# Patient Record
Sex: FEMALE | Race: WHITE | NOT HISPANIC OR LATINO | Employment: OTHER | URBAN - METROPOLITAN AREA
[De-identification: names, ages, dates, MRNs, and addresses within clinical notes are randomized per-mention and may not be internally consistent; named-entity substitution may affect disease eponyms.]

---

## 2017-01-09 ENCOUNTER — ALLSCRIPTS OFFICE VISIT (OUTPATIENT)
Dept: OTHER | Facility: OTHER | Age: 57
End: 2017-01-09

## 2017-01-09 DIAGNOSIS — N18.9 CHRONIC KIDNEY DISEASE: ICD-10-CM

## 2017-01-09 DIAGNOSIS — N18.30 CHRONIC KIDNEY DISEASE, STAGE III (MODERATE) (HCC): ICD-10-CM

## 2017-01-09 DIAGNOSIS — I12.9 HYPERTENSIVE CHRONIC KIDNEY DISEASE WITH STAGE 1 THROUGH STAGE 4 CHRONIC KIDNEY DISEASE, OR UNSPECIFIED CHRONIC KIDNEY DISEASE: ICD-10-CM

## 2017-01-20 ENCOUNTER — GENERIC CONVERSION - ENCOUNTER (OUTPATIENT)
Dept: OTHER | Facility: OTHER | Age: 57
End: 2017-01-20

## 2017-02-28 ENCOUNTER — LAB CONVERSION - ENCOUNTER (OUTPATIENT)
Dept: OTHER | Facility: OTHER | Age: 57
End: 2017-02-28

## 2017-02-28 LAB
A/G RATIO (HISTORICAL): 0.9 (CALC) (ref 1–2.5)
ALBUMIN SERPL BCP-MCNC: 3.6 G/DL (ref 3.6–5.1)
ALP SERPL-CCNC: 145 U/L (ref 33–130)
ALT SERPL W P-5'-P-CCNC: 9 U/L (ref 6–29)
AST SERPL W P-5'-P-CCNC: 12 U/L (ref 10–35)
BILIRUB SERPL-MCNC: 0.3 MG/DL (ref 0.2–1.2)
BILIRUBIN DIRECT (HISTORICAL): 0.1 MG/DL
CK SERPL-CCNC: 50 U/L (ref 29–143)
GAMMA GLOBULIN (HISTORICAL): 3.9 G/DL (CALC) (ref 1.9–3.7)
INDIRECT BILIRUBIN (HISTORICAL): 0.2 MG/DL (CALC) (ref 0.2–1.2)
TOTAL PROTEIN (HISTORICAL): 7.5 G/DL (ref 6.1–8.1)

## 2017-03-01 ENCOUNTER — GENERIC CONVERSION - ENCOUNTER (OUTPATIENT)
Dept: OTHER | Facility: OTHER | Age: 57
End: 2017-03-01

## 2017-03-02 DIAGNOSIS — N18.30 CHRONIC KIDNEY DISEASE, STAGE III (MODERATE) (HCC): ICD-10-CM

## 2017-03-02 DIAGNOSIS — E78.5 HYPERLIPIDEMIA: ICD-10-CM

## 2017-04-13 ENCOUNTER — LAB CONVERSION - ENCOUNTER (OUTPATIENT)
Dept: OTHER | Facility: OTHER | Age: 57
End: 2017-04-13

## 2017-04-13 DIAGNOSIS — N18.30 CHRONIC KIDNEY DISEASE, STAGE III (MODERATE) (HCC): ICD-10-CM

## 2017-04-13 DIAGNOSIS — E78.5 HYPERLIPIDEMIA: ICD-10-CM

## 2017-04-13 DIAGNOSIS — I12.9 HYPERTENSIVE CHRONIC KIDNEY DISEASE WITH STAGE 1 THROUGH STAGE 4 CHRONIC KIDNEY DISEASE, OR UNSPECIFIED CHRONIC KIDNEY DISEASE: ICD-10-CM

## 2017-04-13 LAB
A/G RATIO (HISTORICAL): 0.8 (CALC) (ref 1–2.5)
A/G RATIO (HISTORICAL): 0.8 (CALC) (ref 1–2.5)
ALBUMIN SERPL BCP-MCNC: 3.2 G/DL (ref 3.6–5.1)
ALBUMIN SERPL BCP-MCNC: 3.2 G/DL (ref 3.6–5.1)
ALP SERPL-CCNC: 149 U/L (ref 33–130)
ALP SERPL-CCNC: 149 U/L (ref 33–130)
ALT SERPL W P-5'-P-CCNC: 9 U/L (ref 6–29)
ALT SERPL W P-5'-P-CCNC: 9 U/L (ref 6–29)
AST SERPL W P-5'-P-CCNC: 9 U/L (ref 10–35)
AST SERPL W P-5'-P-CCNC: 9 U/L (ref 10–35)
BILIRUB SERPL-MCNC: 0.3 MG/DL (ref 0.2–1.2)
BILIRUB SERPL-MCNC: 0.3 MG/DL (ref 0.2–1.2)
BILIRUBIN DIRECT (HISTORICAL): 0.1 MG/DL
BUN SERPL-MCNC: 32 MG/DL (ref 7–25)
BUN/CREA RATIO (HISTORICAL): 24 (CALC) (ref 6–22)
CALCIUM SERPL-MCNC: 8.8 MG/DL (ref 8.6–10.4)
CHLORIDE SERPL-SCNC: 99 MMOL/L (ref 98–110)
CK SERPL-CCNC: 45 U/L (ref 29–143)
CO2 SERPL-SCNC: 31 MMOL/L (ref 20–31)
CREAT SERPL-MCNC: 1.32 MG/DL (ref 0.5–1.05)
EGFR AFRICAN AMERICAN (HISTORICAL): 51 ML/MIN/1.73M2
EGFR-AMERICAN CALC (HISTORICAL): 44 ML/MIN/1.73M2
GAMMA GLOBULIN (HISTORICAL): 3.8 G/DL (CALC) (ref 1.9–3.7)
GAMMA GLOBULIN (HISTORICAL): 3.8 G/DL (CALC) (ref 1.9–3.7)
GLUCOSE (HISTORICAL): 290 MG/DL (ref 65–99)
INDIRECT BILIRUBIN (HISTORICAL): 0.2 MG/DL (CALC) (ref 0.2–1.2)
POTASSIUM SERPL-SCNC: 5 MMOL/L (ref 3.5–5.3)
SODIUM SERPL-SCNC: 137 MMOL/L (ref 135–146)
TOTAL PROTEIN (HISTORICAL): 7 G/DL (ref 6.1–8.1)
TOTAL PROTEIN (HISTORICAL): 7 G/DL (ref 6.1–8.1)

## 2017-05-01 DIAGNOSIS — E78.5 HYPERLIPIDEMIA: ICD-10-CM

## 2017-05-01 DIAGNOSIS — N18.30 CHRONIC KIDNEY DISEASE, STAGE III (MODERATE) (HCC): ICD-10-CM

## 2017-05-01 DIAGNOSIS — E55.9 VITAMIN D DEFICIENCY: ICD-10-CM

## 2017-05-01 DIAGNOSIS — N18.9 CHRONIC KIDNEY DISEASE: ICD-10-CM

## 2017-05-01 DIAGNOSIS — I12.9 HYPERTENSIVE CHRONIC KIDNEY DISEASE WITH STAGE 1 THROUGH STAGE 4 CHRONIC KIDNEY DISEASE, OR UNSPECIFIED CHRONIC KIDNEY DISEASE: ICD-10-CM

## 2017-05-02 ENCOUNTER — ALLSCRIPTS OFFICE VISIT (OUTPATIENT)
Dept: OTHER | Facility: OTHER | Age: 57
End: 2017-05-02

## 2017-05-09 ENCOUNTER — LAB CONVERSION - ENCOUNTER (OUTPATIENT)
Dept: OTHER | Facility: OTHER | Age: 57
End: 2017-05-09

## 2017-05-09 ENCOUNTER — GENERIC CONVERSION - ENCOUNTER (OUTPATIENT)
Dept: OTHER | Facility: OTHER | Age: 57
End: 2017-05-09

## 2017-05-09 LAB
BASOPHILS # BLD AUTO: 0.3 %
BASOPHILS # BLD AUTO: 32 CELLS/UL (ref 0–200)
BUN SERPL-MCNC: 23 MG/DL (ref 7–25)
BUN/CREA RATIO (HISTORICAL): 18 (CALC) (ref 6–22)
CALCIUM SERPL-MCNC: 9 MG/DL (ref 8.6–10.4)
CALCIUM SERPL-MCNC: 9.1 MG/DL (ref 8.6–10.4)
CHLORIDE SERPL-SCNC: 100 MMOL/L (ref 98–110)
CO2 SERPL-SCNC: 30 MMOL/L (ref 20–31)
CREAT SERPL-MCNC: 1.3 MG/DL (ref 0.5–1.05)
CREATININE, RANDOM URINE (HISTORICAL): 55 MG/DL (ref 20–320)
DEPRECATED RDW RBC AUTO: 18.1 % (ref 11–15)
EGFR AFRICAN AMERICAN (HISTORICAL): 52 ML/MIN/1.73M2
EGFR-AMERICAN CALC (HISTORICAL): 45 ML/MIN/1.73M2
EOSINOPHIL # BLD AUTO: 1.9 %
EOSINOPHIL # BLD AUTO: 205 CELLS/UL (ref 15–500)
FERRITIN SERPL-MCNC: 39 NG/ML (ref 10–232)
GLUCOSE (HISTORICAL): 176 MG/DL (ref 65–99)
HCT VFR BLD AUTO: 35.6 % (ref 35–45)
HGB BLD-MCNC: 11.1 G/DL (ref 11.7–15.5)
IRON SATN MFR SERPL: 16 % (CALC) (ref 11–50)
IRON SERPL-MCNC: 58 MCG/DL (ref 45–160)
LYMPHOCYTES # BLD AUTO: 12 %
LYMPHOCYTES # BLD AUTO: 1296 CELLS/UL (ref 850–3900)
MAGNESIUM SERPL-MCNC: 1.7 MG/DL (ref 1.5–2.5)
MCH RBC QN AUTO: 29.9 PG (ref 27–33)
MCHC RBC AUTO-ENTMCNC: 31.1 G/DL (ref 32–36)
MCV RBC AUTO: 96 FL (ref 80–100)
MONOCYTES # BLD AUTO: 562 CELLS/UL (ref 200–950)
MONOCYTES (HISTORICAL): 5.2 %
NEUTROPHILS # BLD AUTO: 80.6 %
NEUTROPHILS # BLD AUTO: 8705 CELLS/UL (ref 1500–7800)
PHOSPHATE SERPL-MCNC: 3.3 MG/DL (ref 2.5–4.5)
PLATELET # BLD AUTO: 344 THOUSAND/UL (ref 140–400)
PMV BLD AUTO: 8.1 FL (ref 7.5–12.5)
POTASSIUM SERPL-SCNC: 4.6 MMOL/L (ref 3.5–5.3)
PROT UR-MCNC: 24 MG/DL (ref 5–24)
PROT/CREAT UR: 436 MG/G CREAT (ref 21–161)
PTH-INTACT SERPL-MCNC: 29 PG/ML (ref 14–64)
RBC # BLD AUTO: 3.71 MILLION/UL (ref 3.8–5.1)
SODIUM SERPL-SCNC: 140 MMOL/L (ref 135–146)
TIBC SERPL-MCNC: 357 MCG/DL (CALC) (ref 250–450)
WBC # BLD AUTO: 10.8 THOUSAND/UL (ref 3.8–10.8)

## 2017-09-01 DIAGNOSIS — N18.30 CHRONIC KIDNEY DISEASE, STAGE III (MODERATE) (HCC): ICD-10-CM

## 2017-09-01 DIAGNOSIS — E55.9 VITAMIN D DEFICIENCY: ICD-10-CM

## 2017-09-01 DIAGNOSIS — I12.9 HYPERTENSIVE CHRONIC KIDNEY DISEASE WITH STAGE 1 THROUGH STAGE 4 CHRONIC KIDNEY DISEASE, OR UNSPECIFIED CHRONIC KIDNEY DISEASE: ICD-10-CM

## 2017-09-01 DIAGNOSIS — E78.5 HYPERLIPIDEMIA: ICD-10-CM

## 2017-09-01 DIAGNOSIS — N18.9 CHRONIC KIDNEY DISEASE: ICD-10-CM

## 2017-09-02 ENCOUNTER — LAB CONVERSION - ENCOUNTER (OUTPATIENT)
Dept: OTHER | Facility: OTHER | Age: 57
End: 2017-09-02

## 2017-09-02 LAB
25(OH)D3 SERPL-MCNC: 72 NG/ML (ref 30–100)
A/G RATIO (HISTORICAL): 1 (CALC) (ref 1–2.5)
ALBUMIN SERPL BCP-MCNC: 3.5 G/DL (ref 3.6–5.1)
ALP SERPL-CCNC: 115 U/L (ref 33–130)
ALT SERPL W P-5'-P-CCNC: 7 U/L (ref 6–29)
AST SERPL W P-5'-P-CCNC: 10 U/L (ref 10–35)
BASOPHILS # BLD AUTO: 0.5 %
BASOPHILS # BLD AUTO: 44 CELLS/UL (ref 0–200)
BILIRUB SERPL-MCNC: 0.4 MG/DL (ref 0.2–1.2)
BUN SERPL-MCNC: 19 MG/DL (ref 7–25)
BUN/CREA RATIO (HISTORICAL): 15 (CALC) (ref 6–22)
CALCIUM SERPL-MCNC: 9.1 MG/DL (ref 8.6–10.4)
CHLORIDE SERPL-SCNC: 100 MMOL/L (ref 98–110)
CO2 SERPL-SCNC: 33 MMOL/L (ref 20–31)
CREAT SERPL-MCNC: 1.29 MG/DL (ref 0.5–1.05)
CREATININE, RANDOM URINE (HISTORICAL): 53 MG/DL (ref 20–320)
DEPRECATED RDW RBC AUTO: 15.7 % (ref 11–15)
EGFR AFRICAN AMERICAN (HISTORICAL): 53 ML/MIN/1.73M2
EGFR-AMERICAN CALC (HISTORICAL): 46 ML/MIN/1.73M2
EOSINOPHIL # BLD AUTO: 264 CELLS/UL (ref 15–500)
EOSINOPHIL # BLD AUTO: 3 %
GAMMA GLOBULIN (HISTORICAL): 3.5 G/DL (CALC) (ref 1.9–3.7)
GLUCOSE (HISTORICAL): 115 MG/DL (ref 65–99)
HCT VFR BLD AUTO: 32.4 % (ref 35–45)
HGB BLD-MCNC: 9.8 G/DL (ref 11.7–15.5)
LYMPHOCYTES # BLD AUTO: 10.6 %
LYMPHOCYTES # BLD AUTO: 933 CELLS/UL (ref 850–3900)
MCH RBC QN AUTO: 29.1 PG (ref 27–33)
MCHC RBC AUTO-ENTMCNC: 30.2 G/DL (ref 32–36)
MCV RBC AUTO: 96.1 FL (ref 80–100)
MONOCYTES # BLD AUTO: 554 CELLS/UL (ref 200–950)
MONOCYTES (HISTORICAL): 6.3 %
NEUTROPHILS # BLD AUTO: 7005 CELLS/UL (ref 1500–7800)
NEUTROPHILS # BLD AUTO: 79.6 %
PLATELET # BLD AUTO: 327 THOUSAND/UL (ref 140–400)
PMV BLD AUTO: 9.9 FL (ref 7.5–12.5)
POTASSIUM SERPL-SCNC: 5.1 MMOL/L (ref 3.5–5.3)
PROT UR-MCNC: 29 MG/DL (ref 5–24)
PROT/CREAT UR: 547 MG/G CREAT (ref 21–161)
RBC # BLD AUTO: 3.37 MILLION/UL (ref 3.8–5.1)
SODIUM SERPL-SCNC: 142 MMOL/L (ref 135–146)
TOTAL PROTEIN (HISTORICAL): 7 G/DL (ref 6.1–8.1)
WBC # BLD AUTO: 8.8 THOUSAND/UL (ref 3.8–10.8)

## 2018-01-10 NOTE — MISCELLANEOUS
Message   Recorded as Task   Date: 08/18/2016 03:25 PM, Created By: Lluvia King   Task Name: Follow Up   Assigned To: Alaina Cruz   Regarding Patient: Janeth Hatfield, Status: Active   CommentGeremberto Tony - 18 Aug 2016 3:25 PM     TASK CREATED  1  Increase phosphorus in her diet including skim milk if possible  2  Place on iron Niferex 150 mg twice a day as tolerated  Watch for constipation  3  CBC/basic metabolic profile/magnesium/phosphorus Tuesday next week 8Ã¢??23Ã¢? ?16   Spoke to pt and informed about the above  Medication sent to pharmacy  PL      Active Problems    1  Anemia in chronic kidney disease (285 21,585 9) (N18 9,D63 1)   2  Benign hypertensive CKD (403 10) (I12 9)   3  CKD (chronic kidney disease), stage 1 (585 1) (N18 1)    Current Meds   1  Diltiazem HCl - 120 MG Oral Tablet; TAKE 1 TABLET DAILY; Therapy: 62VIK2573 to Recorded   2  Furosemide 40 MG Oral Tablet; TAKE 2 TABLETS DAILY; Therapy: 22SWF4629 to Recorded   3  Lisinopril 10 MG Oral Tablet; TAKE 1 TABLET DAILY; Therapy: 37ENH0194 to (Evaluate:60Cqt6157) Recorded   4  Megestrol Acetate 40 MG Oral Tablet; take 1 tablet daily at bedtime; Therapy: 01SDX1815 to Recorded   5  Pantoprazole Sodium 40 MG Oral Tablet Delayed Release; TAKE 1 TABLET DAILY; Therapy: 74GRX7436 to Recorded   6  Potassium Chloride ER 20 MEQ Oral Tablet Extended Release; Take 1 tablet daily; Therapy: 37HWF4265 to (Evaluate:00Xcm1434) Recorded   7  TraMADol HCl - 50 MG Oral Tablet; TAKE 2 TABLETS 3 TIMES DAILY; Therapy: 97LXW0116 to Recorded   8  Ventolin  (90 Base) MCG/ACT Inhalation Aerosol Solution; INHALE 1 PUFF   EVERY 4 HOURS AS NEEDED; Therapy: 98VAE8670 to Recorded   9  Warfarin Sodium 5 MG Oral Tablet; TAKE 1 TABLET ON MON WED FRI AND TAKE 1/2   (12 5MG) ON TUE THU SAT SUN; Therapy: 68WBF3944 to Recorded    Allergies    1  FLU   2   Pneumovax 23 INJ    Plan  Anemia in chronic kidney disease    · Ferrex 150 150 MG Oral Capsule;  Take 1 capsule twice daily    Signatures   Electronically signed by : BORA Rios ; Aug 19 2016  3:39PM EST

## 2018-01-11 NOTE — MISCELLANEOUS
Message   Recorded as Task   Date: 11/02/2016 12:51 PM, Created By: Armando Ricardo   Task Name: Follow Up   Assigned To: Dore Angelucci   Regarding Patient: Geetha Henson, Status: Active   CommentAnjali Mckeon - 02 Nov 2016 12:51 PM     TASK CREATED  1   Kidney labs look good  2  Repeat a CMP because of elevated alkaline phosphatase along with a GGT some time this week or early next week  3  Repeat CBC and a ferritin level which I believe was not done   Spoke to pt and informed about the above  Lab slips faxed to [facility], located within [facility]  PL      Active Problems    1  Anemia in chronic kidney disease (285 21,585 9) (N18 9,D63 1)   2  Benign hypertensive CKD (403 10) (I12 9)   3  Chronic kidney disease (CKD), stage 3 (moderate) (585 3) (N18 3)   4  CKD (chronic kidney disease), stage 1 (585 1) (N18 1)   5  Dysuria (788 1) (R30 0)    Current Meds   1  DiltiaZEM HCl - 120 MG Oral Tablet; TAKE 1 TABLET DAILY; Therapy: 32FDM1326 to Recorded   2  Folic Acid 1 MG Oral Tablet; Take 1 tablet daily; Therapy: 04FGJ0128 to Recorded   3  Furosemide 40 MG Oral Tablet; TAKE 2 TABLETS DAILY; Therapy: 48RER3730 to Recorded   4  Levothyroxine Sodium 75 MCG Oral Tablet; TAKE 1 TABLET DAILY; Therapy: 77LZM1502 to Recorded   5  Pantoprazole Sodium 40 MG Oral Tablet Delayed Release; TAKE 1 TABLET DAILY; Therapy: 93BLK8717 to Recorded   6  TraMADol HCl - 50 MG Oral Tablet; TAKE 2 TABLETS 3 TIMES DAILY; Therapy: 83KGH7064 to Recorded   7  Ventolin  (90 Base) MCG/ACT Inhalation Aerosol Solution; INHALE 1 PUFF   EVERY 4 HOURS AS NEEDED; Therapy: 43GXW1621 to Recorded   8  Warfarin Sodium 5 MG Oral Tablet; TAKE 1 TABLET ON MON WED FRI AND TAKE 1/2   (12 5MG) ON TUE THU SAT SUN; Therapy: 83VYR8663 to Recorded    Allergies    1  FLU   2   Pneumovax 23 INJ    Signatures   Electronically signed by : Daneen Gilford, M D ; Nov 7 2016  3:42PM EST

## 2018-01-13 VITALS — DIASTOLIC BLOOD PRESSURE: 80 MMHG | SYSTOLIC BLOOD PRESSURE: 130 MMHG | HEART RATE: 88 BPM

## 2018-01-13 VITALS — HEART RATE: 84 BPM | DIASTOLIC BLOOD PRESSURE: 80 MMHG | SYSTOLIC BLOOD PRESSURE: 124 MMHG

## 2018-01-14 NOTE — MISCELLANEOUS
Message   Recorded as Task   Date: 07/29/2016 01:48 PM, Created By: Johny Sarah   Task Name: Follow Up   Assigned To: Cris Brina   Regarding Patient: Damien Goldman, Status: In Progress   Comment:    Delmar Winchester - 29 Jul 2016 1:48 PM     TASK CREATED  Have The patient pursue a modest fluid restriction of 1500 ML's per day because of a slightly low sodium  Have repeat a basic metabolic profile this week  Cris Gonsalves - 29 Jul 2016 2:39 PM     TASK IN [place] [id number] - 29 Jul 2016 2:40 PM     TASK EDITED  called pt and left message to call the office   Recorded as Task   Date: 07/29/2016 01:49 PM, Created By: Johny Sarah   Task Name: Follow Up   Assigned To: Cris Gonsalves   Regarding Patient: Damien Goldman, Status: In Progress   Comment:    Delmar Winchester - 29 Jul 2016 1:49 PM     TASK CREATED  Also have her go for a phosphorus level and iron studies  Ariana Rush - 29 Jul 2016 2:39 PM     TASK IN PROGRESS   Spoke to pt and informed about the above  Lab slips mailed to pt  PL      Active Problems    1  Anemia in chronic kidney disease (285 21,585 9) (N18 9,D63 1)   2  Benign hypertensive CKD (403 10) (I12 9)   3  CKD (chronic kidney disease), stage 1 (585 1) (N18 1)    Current Meds   1  Diltiazem HCl - 120 MG Oral Tablet; TAKE 1 TABLET DAILY; Therapy: 49YJS2235 to Recorded   2  Furosemide 40 MG Oral Tablet; TAKE 2 TABLETS DAILY; Therapy: 30JFC6347 to Recorded   3  Lisinopril 10 MG Oral Tablet; TAKE 1 TABLET DAILY; Therapy: 15SCL5569 to (Evaluate:71Vav4083) Recorded   4  Megestrol Acetate 40 MG Oral Tablet; take 1 tablet daily at bedtime; Therapy: 31OWX3671 to Recorded   5  Pantoprazole Sodium 40 MG Oral Tablet Delayed Release; TAKE 1 TABLET DAILY; Therapy: 13HOD6610 to Recorded   6  Potassium Chloride ER 20 MEQ Oral Tablet Extended Release; Take 1 tablet daily; Therapy: 08KZA6002 to (Evaluate:23Ylq1252) Recorded   7   TraMADol HCl - 50 MG Oral Tablet; TAKE 2 TABLETS 3 TIMES DAILY; Therapy: 61YID4013 to Recorded   8  Ventolin  (90 Base) MCG/ACT Inhalation Aerosol Solution; INHALE 1 PUFF   EVERY 4 HOURS AS NEEDED; Therapy: 79NII4811 to Recorded   9  Warfarin Sodium 5 MG Oral Tablet; TAKE 1 TABLET ON MON WED FRI AND TAKE 1/2   (12 5MG) ON TUE THU SAT SUN; Therapy: 38JOM9223 to Recorded    Allergies    1  FLU   2  Pneumovax 23 INJ    Plan  Anemia in chronic kidney disease, Benign hypertensive CKD, CKD (chronic kidney  disease), stage 1    · (1) FERRITIN; Status:Active - Retrospective By Protocol Authorization; Requested  for:52Hdk1084;    · (1) IRON SATURATION %, TIBC; Status:Active - Retrospective By Protocol Authorization; Requested for:29Xzc5635;    · (1) IRON; Status:Active - Retrospective By Protocol Authorization; Requested  for:46Wdc7358;   Benign hypertensive CKD, CKD (chronic kidney disease), stage 1    · (1) BASIC METABOLIC PROFILE; Status:Active - Retrospective By Protocol  Authorization; Requested for:78Bfx4235;    · (1) PHOSPHORUS; Status:Active - Retrospective By Protocol Authorization;  Requested  for:40Apc8742;     Signatures   Electronically signed by : BORA Little ; Aug  2 2016 10:38AM EST

## 2018-01-15 NOTE — MISCELLANEOUS
Message   Recorded as Task   Date: 05/09/2017 09:36 AM, Created By: Courtney Aaron   Task Name: Follow Up   Assigned To: Heather Angeles   Regarding Patient: Crystal Victor, Status: Active   CommentAllabhilash Miles - 09 May 2017 9:36 AM     TASK CREATED  Ferraheme 500mg x2 on two separate infusions   Hazel Manuel - 09 May 2017 2:47 PM     TASK REASSIGNED: Previously Assigned To [facility],Norah Meadows scheduled at [facility] for 5/16/17 and 5/23/17 @10:30 AM/lr      Active Problems    1  Anemia in chronic kidney disease (285 21) (N18 9,D63 1)   2  Benign hypertensive CKD (403 10) (I12 9)   3  Chronic kidney disease (CKD), stage 3 (moderate) (585 3) (N18 3)   4  CKD (chronic kidney disease), stage 1 (585 1) (N18 1)   5  Dysuria (788 1) (R30 0)   6  Hyperlipidemia (272 4) (E78 5)   7  Vitamin D deficiency (268 9) (E55 9)    Current Meds   1  Atorvastatin Calcium 10 MG Oral Tablet; TAKE 1 TABLET DAILY; Therapy: 02OJY9354 to (Evaluate:86Psy0090)  Requested for: 08GLC1593; Last   Rx:19Jan2017 Ordered   2  DilTIAZem HCl - 120 MG Oral Tablet; TAKE 1 TABLET DAILY; Therapy: 36MWK5288 to Recorded   3  Furosemide 40 MG Oral Tablet; TAKE 2 TABLETS DAILY; Therapy: 05SNR5135 to Recorded   4  Gabapentin 300 MG TABS; TAKE 1 TABLET 3 TIMES DAILY; Therapy: (Recorded:75Wuh2523) to Recorded   5  Janumet  MG Oral Tablet; TAKE 1 TABLET TWICE DAILY WITH MEALS; Therapy: (Recorded:31Cuq9271) to Recorded   6  Levothyroxine Sodium 88 MCG Oral Tablet; TAKE 1 TABLET DAILY; Therapy: 99UHB1101 to Recorded   7  Pantoprazole Sodium 40 MG Oral Tablet Delayed Release; TAKE 1 TABLET DAILY; Therapy: 01WVF5670 to Recorded   8  Ventolin  (90 Base) MCG/ACT Inhalation Aerosol Solution; INHALE 1 PUFF   EVERY 4 HOURS AS NEEDED; Therapy: 83YBM5955 to Recorded   9  Vitamin D3 2000 UNIT Oral Capsule; Take 2 capsules daily;    Therapy: 15UQK0539 to (Evaluate:10Knp4177)  Requested for: 13JJK2717; Last   Rx:19Jan2017 Ordered 10  Warfarin Sodium 5 MG Oral Tablet; TAKE 1 TABLET ON MON WED FRI AND TAKE 1/2    (12 5MG) ON TUE THU SAT SUN; Therapy: 68ZGM5946 to Recorded    Allergies    1  FLU   2   Pneumovax 23 INJ    Signatures   Electronically signed by : Omer Saha, ; May  9 2017  4:09PM EST                       (Author)

## 2018-01-17 NOTE — PROGRESS NOTES
Assessment    1  Anemia in chronic kidney disease (285 21,585 9) (N18 9,D63 1)   2  Benign hypertensive CKD (403 10) (I12 9)   3  Chronic kidney disease (CKD), stage 3 (moderate) (585 3) (N18 3)   4  History of Nephrectomy Right   5  History of Total Abdominal Hysterectomy With Removal Of Both Ovaries   6  History of Anemia, chronic disease (285 29) (D63 8)   7  History of chronic kidney disease (V13 09) (Z87 448)    Plan  Benign hypertensive CKD    · (1) BASIC METABOLIC PROFILE; Status:Active; Requested for:01Oct2016;    Perform:[facility]; BUV:50UAA3030;SATAOYG; For:Benign hypertensive CKD; Ordered By:Lyndsay Alvarez;    Discussion/Summary    #1  Chronic kidney disease: Status post right radical nephrectomy on July 15, 2016  Previous to nephrectomy patient was classified as CKD stage I  Following nephrectomy creatinine has been slowly climbing -1 3 on 7/28, 1 52 on 8/16 and 1 6 on September 19  In May creatinine was 0 89  Patient has also undergone total abdominal hysterectomy on September 2  Renal ultrasound was negative for obstructive uropathy  ACE inhibitor is on hold  Will need to continue to trend creatinine to determine baseline which will likely end up to be CKD stage III  #2  Hypertension  The pressure adequately controlled with diltiazem and furosemide  Lisinopril on hold since blood pressure was on the low side and creatinine was increasing  The pressure acceptable  Patient has VNA for assistance at home  Patient's significant other reports that blood pressure readings at home are generally in the 130s over 70s  #3  Anemia  Iron deficiency  Patient was to start oral iron replacement but cannot afford it  We will be sending her for 2 doses of Feraheme  #4  Electrolytes  Potassium acceptable at 4 9  #5  Volume status  Patient examines euvolemic  #6  Status post total abdominal hysterectomy on September 2, 2016   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  Other: Questionable wound infection  Patient on cephalexin  Cultures pending  -History of atrial fibrillation on Coumadin, dyslipidemia  The patient, patient's caretaker was counseled regarding diagnostic results, instructions for management, risk factor reductions, prognosis, patient and family education, impressions, importance of compliance with treatment  total time of encounter was 45 minutes and 20 minutes was spent counseling  Possible side effects of new medications were reviewed with the patient/guardian today  The treatment plan was reviewed with the patient/guardian  The patient/guardian understands and agrees with the treatment plan      Reason For Visit  Follow-up for chronic kidney disease and hypertension      History of Present Illness  Ronn Ardon is being followed for stage I chronic kidney disease  She had a right radical nephrectomy July 15, 2016  Since her nephrectomy creatinine has been increasing and is currently up to 1 61  Renal ultrasound was negative for hydronephrosis or obstructive uropathy  ACE inhibitor was placed on hold when she was seen on July 24 during her visit with Dr Damien Morejon  Patient is also status post hysterectomy  Tissue pathology report shows well-differentiated adenocarcinoma extending 68% way into the myometrium  The patient was seen and examined with her significant other in attendance  She is currently under treatment for wound infection  When she was seen by her surgeon yesterday the wound was opened and there was a significant amount of drainage  A wound culture was sent  She is on cephalexin orally and will follow-up with infectious disease  Actually she is feeling quite well overall  Her appetite is good  She has no difficulty passing her urine  No significant lower extremity edema  I examined her abdomen  The wound is open with iodoform packing in place  There is mild skin erythema surrounding the area but there is no unusual warmth   Patient denies fever, chills  No chest pain or shortness of breath  Patient nonambulatory  Requires the use of a ruth ann  Standing blood pressure not taken      Review of Systems    Constitutional: no fever, no chills, no anorexia and no fatigue  Integumentary: No complaints of skin rash  Gastrointestinal: abdominal pain, but no constipation, no nausea, no diarrhea and no vomiting  Respiratory: no shortness of breath and no cough  Cardiovascular: no orthopnea, no chest pain and no lower extremity edema  Musculoskeletal: joint pain, but no joint swelling  Neurological: No complaints of headache, no lightheadedness or dizziness  Genitourinary: no dysuria, no hematuria, no change in urinary frequency and no incomplete emptying of bladder  Eyes: No complaints of eyesight problems or dryness of eyes  ENT: no complaints of hearing loss, no nasal discharge  Psychiatric: Not suicidal, no sleep disturbance, no anxiety or depression, no change in personality, no emotional problems  Past Medical History    The active problems and past medical history were reviewed and updated today  Surgical History    The surgical history was reviewed and updated today  Family History    The family history was reviewed and updated today  Social History  The social history was reviewed and updated today  The social history was reviewed and is unchanged  Current Meds   1  Diltiazem HCl - 120 MG Oral Tablet; TAKE 1 TABLET DAILY; Therapy: 24DNW9443 to Recorded   2  Folic Acid 1 MG Oral Tablet; Take 1 tablet daily; Therapy: 24GRQ3058 to Recorded   3  Furosemide 40 MG Oral Tablet; TAKE 2 TABLETS DAILY; Therapy: 46VCM5821 to Recorded   4  Levothyroxine Sodium 75 MCG Oral Tablet; TAKE 1 TABLET DAILY; Therapy: 37IRR9760 to Recorded   5  Pantoprazole Sodium 40 MG Oral Tablet Delayed Release; TAKE 1 TABLET DAILY; Therapy: 35CTG6959 to Recorded   6   TraMADol HCl - 50 MG Oral Tablet; TAKE 2 TABLETS 3 TIMES DAILY; Therapy: 96XEI1890 to Recorded   7  Ventolin  (90 Base) MCG/ACT Inhalation Aerosol Solution; INHALE 1 PUFF   EVERY 4 HOURS AS NEEDED; Therapy: 90IRQ1631 to Recorded   8  Warfarin Sodium 5 MG Oral Tablet; TAKE 1 TABLET ON MON WED FRI AND TAKE 1/2   (12 5MG) ON TUE THU SAT SUN; Therapy: 94TWB0331 to Recorded    The medication list was reviewed and updated today  Allergies    1  FLU   2  Pneumovax 23 INJ    Vitals  Vital Signs    Recorded: 74NUQ7201 04:09PM Recorded: 72RKR1566 75:37PM   Systolic 823, RUE, Sitting    Diastolic 78, RUE, Sitting    Heart Rate 88, Apical    Pulse Quality Irregular, Apical    Height  5 ft 4 in   Weight  312 lb    BMI Calculated  53 55   BSA Calculated  2 36     Physical Exam    Constitutional: General appearance: Abnormal   morbidly obese and appears older than stated age  ENT: External ears and nose appear normal      Eyes: Anicteric sclerae  Neck: No bruit heard over either carotid  JVD:  No JVD present  Pulmonary: Respiratory effort: No increased work of breathing or signs of respiratory distress  Auscultation of lungs: Clear to auscultation  Cardiovascular: Auscultation of heart: Abnormal   The heart rate was normal  The rhythm was irregularly irregular  Heart sounds: the heart sounds were distant  Abdomen: Abnormal   The abdomen was obese  Bowel sounds were normal and No bruit heard  Surgical wound with dressing in place  Extremities: Extremities are abnormal   Extremities: bilateral extremities have trace pitting edema and varicose veins present in both extremities  Skin darkening  Pulses: Dorsalis Pedis and Posterior Tibial pulses normal    Rash: No rash present  Neurologic: Non Focal      Psychiatric: Orientation to person, place, and time: Normal   and Mood and affect: Normal     Back: No CVA tenderness        Results/Data  Nephrology Flowsheet 24NBD0996 12:00AM Auther Ladan     Test Name Result Flag Reference   WBC 9 9 Hemoglobin 9 8     Hematocrit 31 5     Platelets 660     Sodium 137     Potassium 4 9     Chloride 101     Carbon Dioxide 27     Calcium 8 8     GLUCOSE 101     BUN 37     Serum Creatinine 1 61     GFR, NON- 35         Signatures   Electronically signed by : BJ Marmolejo ; Sep 20 2016  5:19PM EST                       (Author)    Electronically signed by : BORA Fiore ; Sep 22 2016  4:31PM EST

## 2018-01-17 NOTE — MISCELLANEOUS
Message   Recorded as Task   Date: 01/13/2017 11:23 AM, Created By: Salas Peng   Task Name: Follow Up   Assigned To: Lavonia Lesch   Regarding Patient: Lexy Aranda, Status: Active   CommentJeffrey Mac - 13 Jan 2017 11:23 AM     TASK CREATED  1  Ergocalciferol 50,000 units once a week for 8 weeks, then vitamin D 4000 units over-the-counter once a day  2  Atorvastatin 10 mg once a day for cholesterol  3   SGOT/SGPT/alkaline phosphatase/CPK in 6 weeks and then again in 12 weeks   I spoke to patient and informed about the above  Medication sent to pharmacy and lab slip faxed to patient  PL      Active Problems    1  Anemia in chronic kidney disease (285 21) (N18 9,D63 1)   2  Benign hypertensive CKD (403 10) (I12 9)   3  Chronic kidney disease (CKD), stage 3 (moderate) (585 3) (N18 3)   4  CKD (chronic kidney disease), stage 1 (585 1) (N18 1)   5  Dysuria (788 1) (R30 0)   6  Hyperlipidemia (272 4) (E78 5)   7  Vitamin D deficiency (268 9) (E55 9)    Current Meds   1  Atorvastatin Calcium 10 MG Oral Tablet; TAKE 1 TABLET DAILY; Therapy: 85YOZ2608 to (Evaluate:20Caa1688)  Requested for: 35UCV4538; Last   Rx:19Jan2017; Status: ACTIVE - Retrospective By Protocol Authorization Ordered   2  DiltiaZEM HCl - 120 MG Oral Tablet; TAKE 1 TABLET DAILY; Therapy: 13CKS3492 to Recorded   3  Folic Acid 1 MG Oral Tablet; Take 1 tablet daily; Therapy: 74CSA8409 to Recorded   4  Furosemide 40 MG Oral Tablet; TAKE 2 TABLETS DAILY; Therapy: 55GNR7109 to Recorded   5  Levothyroxine Sodium 75 MCG Oral Tablet; TAKE 1 TABLET DAILY; Therapy: 79MTC7535 to Recorded   6  Pantoprazole Sodium 40 MG Oral Tablet Delayed Release; TAKE 1 TABLET DAILY; Therapy: 70ZCE1301 to Recorded   7  TraMADol HCl - 50 MG Oral Tablet; TAKE 2 TABLETS 3 TIMES DAILY; Therapy: 88KFD9878 to Recorded   8  Ventolin  (90 Base) MCG/ACT Inhalation Aerosol Solution; INHALE 1 PUFF   EVERY 4 HOURS AS NEEDED;    Therapy: 87HRI0606 to Recorded   9  Vitamin D (Ergocalciferol) 77986 UNIT Oral Capsule; ONE CAPSULE WEEKLY X8   WEEKS; Therapy: 84ZPT9906 to (Last Rx:19Jan2017)  Requested for: 90OCT6238; Status:   ACTIVE - Retrospective By Protocol Authorization Ordered   10  Vitamin D3 2000 UNIT Oral Capsule; Take 2 capsules daily; Therapy: 95MMV8025 to (Evaluate:15Mnl4194)  Requested for: 92CXJ7850; Last    Rx:19Jan2017; Status: ACTIVE - Retrospective By Protocol Authorization Ordered   11  Warfarin Sodium 5 MG Oral Tablet; TAKE 1 TABLET ON MON WED FRI AND TAKE 1/2    (12 5MG) ON TUE THU SAT SUN; Therapy: 64CME9216 to Recorded    Allergies    1  FLU   2   Pneumovax 23 INJ    Signatures   Electronically signed by : BORA Hopper ; Jan 23 2017  1:21PM EST

## 2018-02-27 ENCOUNTER — TELEPHONE (OUTPATIENT)
Dept: NEPHROLOGY | Facility: CLINIC | Age: 58
End: 2018-02-27

## 2018-07-19 DIAGNOSIS — N18.30 CKD (CHRONIC KIDNEY DISEASE) STAGE 3, GFR 30-59 ML/MIN (HCC): Primary | ICD-10-CM

## 2018-07-23 RX ORDER — GLUCOSAMINE/CHONDR SU A SOD 750-600 MG
TABLET ORAL
Qty: 60 CAPSULE | Refills: 5 | Status: SHIPPED | OUTPATIENT
Start: 2018-07-23 | End: 2019-01-14 | Stop reason: SDUPTHER

## 2018-09-18 DIAGNOSIS — E21.3 HYPERPARATHYROIDISM (HCC): ICD-10-CM

## 2018-09-18 DIAGNOSIS — E55.9 VITAMIN D DEFICIENCY: ICD-10-CM

## 2018-09-18 DIAGNOSIS — N18.30 CKD (CHRONIC KIDNEY DISEASE), STAGE III (HCC): Primary | ICD-10-CM

## 2018-09-18 LAB
ALBUMIN SNV-MCNC: 3.4 G/DL
ALP SERPL-CCNC: 96 U/L (ref 46–116)
ALT SERPL W P-5'-P-CCNC: 5 U/L
AST SERPL W P-5'-P-CCNC: 9 U/L (ref 5–45)
BUN SERPL-MCNC: 28 MG/DL (ref 5–25)
CALCIUM SERPL-MCNC: 9.6 MG/DL (ref 8.3–10.1)
CHLORIDE SERPL-SCNC: 95 MMOL/L (ref 100–108)
CO2 SERPL-SCNC: 38 MMOL/L (ref 21–32)
CREAT SERPL-MCNC: 1.78 MG/DL (ref 0.6–1.3)
GLUCOSE SERPL-MCNC: 184 MG/DL
HCT VFR BLD AUTO: 31.6 % (ref 34.8–46.1)
HGB BLD-MCNC: 8.9 G/DL (ref 11.5–15.4)
PLATELET # BLD AUTO: 315 THOUSANDS/UL (ref 149–390)
POTASSIUM SERPL-SCNC: 4.1 MMOL/L (ref 3.5–5.3)
SODIUM SERPL-SCNC: 140 MMOL/L (ref 136–145)
WBC # BLD AUTO: 7.8 THOUSAND/UL

## 2018-09-24 ENCOUNTER — TELEPHONE (OUTPATIENT)
Dept: NEPHROLOGY | Facility: CLINIC | Age: 58
End: 2018-09-24

## 2018-09-24 DIAGNOSIS — N18.30 CKD (CHRONIC KIDNEY DISEASE) STAGE 3, GFR 30-59 ML/MIN (HCC): Primary | ICD-10-CM

## 2018-09-24 NOTE — TELEPHONE ENCOUNTER
I spoke with the pt, she already has an appt with you on 10/4 and repeat labs have been ordered  I will send them out to her

## 2018-09-24 NOTE — TELEPHONE ENCOUNTER
----- Message from Collins Hammer MD sent at 9/24/2018  8:23 AM EDT -----  Creatinine is 1 78  Hemoglobin 9 7  Recommendations:  1  I have not seen this patient in over a year  She needs to see either myself for an advanced practitioner in the next few weeks  2    Please repeat a basic metabolic profile/PTH intact level/vitamin-D level/urine protein creatinine ratio Level/CBC/iron studies/stool for occult blood x3

## 2018-09-26 ENCOUNTER — TELEPHONE (OUTPATIENT)
Dept: NEPHROLOGY | Facility: CLINIC | Age: 58
End: 2018-09-26

## 2018-09-26 LAB
25(OH)D3 SERPL-MCNC: 66 NG/ML (ref 30–100)
ALBUMIN SERPL-MCNC: 3.4 G/DL (ref 3.6–5.1)
ALBUMIN/GLOB SERPL: 0.9 (CALC) (ref 1–2.5)
ALP SERPL-CCNC: 122 U/L (ref 33–130)
ALT SERPL-CCNC: 4 U/L (ref 6–29)
AST SERPL-CCNC: 7 U/L (ref 10–35)
BILIRUB SERPL-MCNC: 0.3 MG/DL (ref 0.2–1.2)
BUN SERPL-MCNC: 26 MG/DL (ref 7–25)
BUN/CREAT SERPL: 17 (CALC) (ref 6–22)
CALCIUM SERPL-MCNC: 8.9 MG/DL (ref 8.6–10.4)
CALCIUM SERPL-MCNC: 9 MG/DL (ref 8.6–10.4)
CHLORIDE SERPL-SCNC: 99 MMOL/L (ref 98–110)
CO2 SERPL-SCNC: 37 MMOL/L (ref 20–32)
CREAT SERPL-MCNC: 1.49 MG/DL (ref 0.5–1.05)
ERYTHROCYTE [DISTWIDTH] IN BLOOD BY AUTOMATED COUNT: 15.4 % (ref 11–15)
GLOBULIN SER CALC-MCNC: 3.8 G/DL (CALC) (ref 1.9–3.7)
GLUCOSE SERPL-MCNC: 122 MG/DL (ref 65–99)
HCT VFR BLD AUTO: 29 % (ref 35–45)
HGB BLD-MCNC: 8.9 G/DL (ref 11.7–15.5)
MAGNESIUM SERPL-MCNC: 1.9 MG/DL (ref 1.5–2.5)
MCH RBC QN AUTO: 30.2 PG (ref 27–33)
MCHC RBC AUTO-ENTMCNC: 30.7 G/DL (ref 32–36)
MCV RBC AUTO: 98.3 FL (ref 80–100)
PHOSPHATE SERPL-MCNC: 3.1 MG/DL (ref 2.5–4.5)
PLATELET # BLD AUTO: 313 THOUSAND/UL (ref 140–400)
PMV BLD REES-ECKER: 9.7 FL (ref 7.5–12.5)
POTASSIUM SERPL-SCNC: 4.4 MMOL/L (ref 3.5–5.3)
PROT SERPL-MCNC: 7.2 G/DL (ref 6.1–8.1)
PTH-INTACT SERPL-MCNC: 44 PG/ML (ref 14–64)
RBC # BLD AUTO: 2.95 MILLION/UL (ref 3.8–5.1)
SL AMB EGFR AFRICAN AMERICAN: 44 ML/MIN/1.73M2
SL AMB EGFR NON AFRICAN AMERICAN: 38 ML/MIN/1.73M2
SODIUM SERPL-SCNC: 142 MMOL/L (ref 135–146)
WBC # BLD AUTO: 7.5 THOUSAND/UL (ref 3.8–10.8)

## 2018-09-26 NOTE — TELEPHONE ENCOUNTER
----- Message from Esther Ward MD sent at 9/26/2018  7:45 AM EDT -----  Patient's hemoglobin is 8 9  Recommendations:  1  Please order iron studies if not recently done along with repeat CBC in about 1 week  2  Please order stool for occult blood x3  3    Have her see 1 of the advanced practitioner's or myself the next several weeks

## 2018-10-04 ENCOUNTER — OFFICE VISIT (OUTPATIENT)
Dept: NEPHROLOGY | Facility: CLINIC | Age: 58
End: 2018-10-04
Payer: MEDICARE

## 2018-10-04 VITALS
SYSTOLIC BLOOD PRESSURE: 120 MMHG | HEART RATE: 72 BPM | HEIGHT: 64 IN | BODY MASS INDEX: 53.55 KG/M2 | DIASTOLIC BLOOD PRESSURE: 72 MMHG

## 2018-10-04 DIAGNOSIS — N18.30 CHRONIC KIDNEY DISEASE, STAGE III (MODERATE) (HCC): ICD-10-CM

## 2018-10-04 DIAGNOSIS — D63.1 ANEMIA OF CHRONIC RENAL FAILURE, STAGE 3 (MODERATE) (HCC): Primary | ICD-10-CM

## 2018-10-04 DIAGNOSIS — N18.30 ANEMIA OF CHRONIC RENAL FAILURE, STAGE 3 (MODERATE) (HCC): Primary | ICD-10-CM

## 2018-10-04 DIAGNOSIS — I12.9 HYPERTENSIVE CHRONIC KIDNEY DISEASE WITH STAGE 1 THROUGH STAGE 4 CHRONIC KIDNEY DISEASE, OR UNSPECIFIED CHRONIC KIDNEY DISEASE: ICD-10-CM

## 2018-10-04 DIAGNOSIS — E61.1 IRON DEFICIENCY: ICD-10-CM

## 2018-10-04 DIAGNOSIS — R80.1 PERSISTENT PROTEINURIA: ICD-10-CM

## 2018-10-04 PROCEDURE — 99215 OFFICE O/P EST HI 40 MIN: CPT | Performed by: INTERNAL MEDICINE

## 2018-10-04 RX ORDER — CHOLECALCIFEROL (VITAMIN D3) 125 MCG
CAPSULE ORAL
Refills: 0 | COMMUNITY
Start: 2018-07-17 | End: 2020-06-25 | Stop reason: SDUPTHER

## 2018-10-04 RX ORDER — ATORVASTATIN CALCIUM 10 MG/1
1 TABLET, FILM COATED ORAL DAILY
COMMUNITY
Start: 2017-01-19 | End: 2020-06-25 | Stop reason: SDUPTHER

## 2018-10-04 RX ORDER — CHOLECALCIFEROL (VITAMIN D3) 50 MCG
2 TABLET ORAL DAILY
COMMUNITY
Start: 2017-08-18 | End: 2019-01-14 | Stop reason: SDUPTHER

## 2018-10-04 RX ORDER — GLIMEPIRIDE 1 MG/1
1 TABLET ORAL DAILY
Refills: 0 | COMMUNITY
Start: 2018-09-12 | End: 2020-09-09

## 2018-10-04 RX ORDER — METOPROLOL SUCCINATE 25 MG/1
25 TABLET, EXTENDED RELEASE ORAL DAILY
Refills: 0 | COMMUNITY
Start: 2018-09-12 | End: 2020-06-25 | Stop reason: SDUPTHER

## 2018-10-04 RX ORDER — FUROSEMIDE 40 MG/1
40 TABLET ORAL DAILY
Refills: 0 | Status: ON HOLD | COMMUNITY
Start: 2018-09-12 | End: 2020-10-02 | Stop reason: SDUPTHER

## 2018-10-04 RX ORDER — LEVOTHYROXINE SODIUM 88 UG/1
1 TABLET ORAL DAILY
COMMUNITY
Start: 2016-09-20 | End: 2020-06-25 | Stop reason: SDUPTHER

## 2018-10-04 RX ORDER — SITAGLIPTIN AND METFORMIN HYDROCHLORIDE 1000; 50 MG/1; MG/1
TABLET, FILM COATED ORAL
Refills: 0 | COMMUNITY
Start: 2018-09-14 | End: 2020-09-09

## 2018-10-04 RX ORDER — FOLIC ACID 1 MG/1
1000 TABLET ORAL DAILY
Refills: 0 | COMMUNITY
Start: 2018-09-12 | End: 2021-05-13

## 2018-10-04 RX ORDER — WARFARIN SODIUM 5 MG/1
TABLET ORAL
COMMUNITY
Start: 2016-05-25 | End: 2020-06-25 | Stop reason: SDUPTHER

## 2018-10-04 NOTE — PROGRESS NOTES
RENAL FOLLOW UP NOTE: td    ASSESSMENT AND PLAN:  1   CKD stage 3 :  · Etiology:  Right radical nephrectomy July 15, 2016 for renal cell CA/hypertensive nephrosclerosis/arteriolar nephrosclerosis/diabetic nephropathy/?  Morbid obesity with? FSGS  · Baseline creatinine:  Approximately 1 3  · Current creatinine:  1 49  This is improved from a creatinine 1 78 in September  · Urine protein creatinine ratio:  NEEDS TO BE OBTAINED  Recommendations:  · Treat hypertension-please see below  · Treat dyslipidemia-please see below  · Maintain proteinuria less than 1 g or as low as possible  · Avoid nephrotoxic agents such as NSAIDs, patient counseled as such  2   Volume:  Close to euvolemic with only minimal edema but the patient has noted decreased urine output significantly on the lower dose of furosemide  3   Hypertension:  None done home at this time    · Goal blood pressure:  Less than 130/80  Recommendations:  ·   Blood pressure appears stable in the office today so no changes on the current regimen but hold on lisinopril specially given the most recent increase in creatinine; pending urine protein creatinine ratio  4   Electrolytes:  Chronic metabolic alkalosis most likely from primary CO2 retention  Has been stable  5   Mineral bone disorder:  · Calcium/magnesium/phosphorus:  All acceptable  · PTH intact:  44 at goal  · Vitamin-D:  66 at goal  6  Dyslipidemia:  · Goal LDL:  Less than 100  · Current lipid profile:  None at this time     Recommendations:  TO OBTAIN LIPID PROFILE  7  Anemia:  Chronic anemia with a hemoglobin of 8 9  This has been drifting down since last year  Iron studies have historically been low  Reviewing records from January, she had a hemoglobin at that time of 7 associated with an INR of 10  She was transfused both with plasma and packed red blood cells  She is noted to have B12 deficiency and had received B12 injections  Also found to have low iron studies and low folic acid  Currently receiving C82 and folic acid  She was also started on iron  Unfortunately she cannot take oral iron because of GI side effects  -B12 and folate deficiency being treated  -multiple myeloma was ruled out in January  -GI evaluation in January of 2018:   colonoscopy demonstrated internal/external hemorrhoids; also colonic polyps which were removed  There was a suboptimal colonic prep  Felt she may require repeat colonoscopy in 3-6 months  EGD demonstrated small hiatal hernia  Recommendations:  -GI follow-up with Dr Garza  -check iron studies, may require intravenous iron  8  Other problems:  · Right radical nephrectomy for renal cell CA July 15, 2016  · 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 2, 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  · Morbid obesity  · Atrial fibrillation  · Cellulitis of the left leg back in January treated with intravenous antibiotics  · COPD on iron  · EGD involving multiple joints  · Hypothyroidism on supplement  · History of gait dysfunction using motorized scooter to ambulate  · Diabetes mellitus/diabetic neuropathy     I spent an additional 20 minutes plus the 20 minutes appointment time in counseling the patient and her significant other regarding the change in her kidney function, her low blood count/anemia and plans for both  I answered all questions as well and reviewed the records in the hospitalization in January  PATIENT INSTRUCTIONS:    Patient Instructions   1  Medication changes today:  -please go back to the Lasix 80 mg once a day  2  We will order labs 1 week to 2 weeks after making the medication adjustment  We will try to get the labs in the morning because we have to get a urine specimen as well  3   We will arrange for an ultrasound of the kidney to make sure it is not blocked  4  Follow-up the gastroenterology Dr Garza  5  Follow-up in 4 months:  -we will arrange for lab work prior to your appointment  6  General instructions:  -avoid salt  -avoid medications such as Motrin, Naprosyn, ibuprofen, Aleve or Advil or Celebrex as they can affect your kidney function; you can use Tylenol as needed for pain or fevers if you have no liver problems  -avoid medications such as Sudafed or other medications with decongestants as they can raise your blood pressure  -try to lose 5-10 lb by your next visit          Subjective:   Overall feeling well  She was referred because her creatinine was slightly higher than typical   Was 1 78 in September  It had been about 1 3 about a year ago  As recalled she is status post nephrectomy for renal cell C A  She currently denies any urinary symptoms including dysuria hematuria voiding symptoms or foamy urine  She denies any fevers chills cough or colds  She has no chest pain, chronic shortness of breath on oxygen but stable, and she denies any significant swelling  She did state that she had her furosemide reduced from 80 mg to 40 mg and she notes a different in terms of the urine output an absolute believe she needs to the 80 mg  She denies any headaches, dizziness or lightheadedness  No gastrointestinal symptoms as well including nausea vomiting or diarrhea  Blood pressure medications:  -Toprol XL 25 mg daily  -furosemide 40 mg daily  Lisinopril has been stopped approximately 4 weeks ago because of an elevated creatinine  Furosemide was decreased as outlined from 80 to40 mg about a month ago  ROS:  See HPI, otherwise review of systems as completely reviewed with the patient are negative     reports that she has never smoked  She has never used smokeless tobacco  She reports that she does not drink alcohol or use drugs      I COMPLETELY REVIEWED THE PAST MEDICAL HISTORY/PAST SURGICAL HISTORY/SOCIAL HISTORY/FAMILY HISTORY/AND MEDICATIONS  AND UPDATED ALL    Objective:     Vitals:   Vitals:    10/04/18 1309   BP: 120/72   Pulse: 72    BP on left forearm 118/72 with a heart rate 78 and slightly irregular    Weight (last 2 days)     Date/Time   Weight    10/04/18 1309  --    Weight: WHEELCHAIR at 10/04/18 1309            Body mass index is 53 55 kg/m²  Physical Exam: General:  Morbidly obese, but in no acute distress; on oxygen and wheelchair  Skin:  No acute rash  Eyes:  No scleral icterus, noninjected  ENT:  Moist mucous membranes  Neck:  Supple, no jugular venous distention  Back   No CVAT  Chest:  Clear to auscultation and percussion  CVS:  Regular rate and rhythm without a rub, murmurs or gallops  Abdomen:   Morbidly obese and a very large pannus, soft and nontender with normal bowel sounds  Extremities:  No cyanosis; only trace lower extremity edema with severe pretibial venous stasis changes  Neuro:  Grossly intact  Psych:  Alert, oriented x3 and appropriate      Medications:    Current Outpatient Prescriptions:     atorvastatin (LIPITOR) 10 mg tablet, Take 1 tablet by mouth daily, Disp: , Rfl:     Cholecalciferol (VITAMIN D) 2000 units tablet, Take 2 capsules by mouth daily, Disp: , Rfl:     cyanocobalamin (VITAMIN B-12) 500 mcg tablet, , Disp: , Rfl: 0    folic acid (FOLVITE) 1 mg tablet, Take 1,000 mcg by mouth daily, Disp: , Rfl: 0    furosemide (LASIX) 40 mg tablet, Take 40 mg by mouth daily, Disp: , Rfl: 0    gabapentin, once-daily, (GRALISE) 300 MG tablet, Take 1 tablet by mouth 3 (three) times a day, Disp: , Rfl:     glimepiride (AMARYL) 1 mg tablet, Take 1 mg by mouth daily, Disp: , Rfl: 0    JANUMET  MG per tablet, , Disp: , Rfl: 0    levothyroxine 88 mcg tablet, Take 1 tablet by mouth daily, Disp: , Rfl:     metoprolol succinate (TOPROL-XL) 25 mg 24 hr tablet, Take 25 mg by mouth daily, Disp: , Rfl: 0    RA VITAMIN D-3 2000 units CAPS, take 2 capsules by mouth daily, Disp: 60 capsule, Rfl: 5    VENTOLIN  (90 Base) MCG/ACT inhaler, Inhale 2 puffs every 4 (four) hours, Disp: , Rfl: 0    warfarin (COUMADIN) 5 mg tablet, Take by mouth, Disp: , Rfl:     Lab, Imaging and other studies: I have personally reviewed pertinent labs  Laboratory Results:  Results for orders placed or performed in visit on 09/25/18   PTH, Intact and Calcium   Result Value Ref Range    PTH, Intact 44 14 - 64 pg/mL    SL AMB CALCIUM 8 9 8 6 - 10 4 mg/dL               Radiology review:   chest X-ray    Ultrasound      Portions of the record may have been created with voice recognition software   Occasional wrong word or "sound a like" substitutions may have occurred due to the inherent limitations of voice recognition software   Read the chart carefully and recognize, using context, where substitutions have occurred

## 2018-10-04 NOTE — LETTER
October 4, 2018     Hassan Holter, MD  [facility]    Patient: Lisa High   YOB: 1960   Date of Visit: 10/4/2018       Dear Dr Sonia Hussein:    Thank you for referring Wyatt Mercado to me for evaluation  Below are my notes for this consultation  If you have questions, please do not hesitate to call me  I look forward to following your patient along with you  Sincerely,        Romelia Talamantes MD        CC: MD Gilberto Villanueva MD Lowery Aye, MD  10/4/2018  1:47 PM  Sign at close encounter  RENAL FOLLOW UP NOTE: td    ASSESSMENT AND PLAN:  1   CKD stage 3 :  · Etiology:  Right radical nephrectomy July 15, 2016 for renal cell CA/hypertensive nephrosclerosis/arteriolar nephrosclerosis/diabetic nephropathy/?  Morbid obesity with? FSGS  · Baseline creatinine:  Approximately 1 3  · Current creatinine:  1 49  This is improved from a creatinine 1 78 in September  · Urine protein creatinine ratio:  NEEDS TO BE OBTAINED  Recommendations:  · Treat hypertension-please see below  · Treat dyslipidemia-please see below  · Maintain proteinuria less than 1 g or as low as possible  · Avoid nephrotoxic agents such as NSAIDs, patient counseled as such  2   Volume:  Close to euvolemic with only minimal edema but the patient has noted decreased urine output significantly on the lower dose of furosemide  3   Hypertension:  None done home at this time    · Goal blood pressure:  Less than 130/80  Recommendations:  ·   Blood pressure appears stable in the office today so no changes on the current regimen but hold on lisinopril specially given the most recent increase in creatinine; pending urine protein creatinine ratio  4   Electrolytes:  Chronic metabolic alkalosis most likely from primary CO2 retention  Has been stable  5   Mineral bone disorder:  · Calcium/magnesium/phosphorus:  All acceptable  · PTH intact:  44 at goal  · Vitamin-D:  66 at goal  6  Dyslipidemia:  · Goal LDL:  Less than 100  · Current lipid profile:  None at this time     Recommendations:  TO OBTAIN LIPID PROFILE  7  Anemia:  Chronic anemia with a hemoglobin of 8 9  This has been drifting down since last year  Iron studies have historically been low  Reviewing records from January, she had a hemoglobin at that time of 7 associated with an INR of 10  She was transfused both with plasma and packed red blood cells  She is noted to have B12 deficiency and had received B12 injections  Also found to have low iron studies and low folic acid  Currently receiving Z13 and folic acid  She was also started on iron  Unfortunately she cannot take oral iron because of GI side effects  -B12 and folate deficiency being treated  -multiple myeloma was ruled out in January  -GI evaluation in January of 2018:   colonoscopy demonstrated internal/external hemorrhoids; also colonic polyps which were removed  There was a suboptimal colonic prep  Felt she may require repeat colonoscopy in 3-6 months  EGD demonstrated small hiatal hernia  Recommendations:  -GI follow-up with Dr Garza  -check iron studies, may require intravenous iron  8  Other problems:  · Right radical nephrectomy for renal cell CA July 15, 2016  · 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 2, 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  · Morbid obesity  · Atrial fibrillation  · Cellulitis of the left leg back in January treated with intravenous antibiotics  · COPD on iron  · EGD involving multiple joints  · Hypothyroidism on supplement  · History of gait dysfunction using motorized scooter to ambulate  · Diabetes mellitus/diabetic neuropathy          PATIENT INSTRUCTIONS:    Patient Instructions   1  Medication changes today:  -please go back to the Lasix 80 mg once a day  2  We will order labs 1 week to 2 weeks after making the medication adjustment    We will try to get the labs in the morning because we have to get a urine specimen as well  3   We will arrange for an ultrasound of the kidney to make sure it is not blocked  4  Follow-up the gastroenterology Dr Garza  5  Follow-up in 4 months:  -we will arrange for lab work prior to your appointment  6  General instructions:  -avoid salt  -avoid medications such as Motrin, Naprosyn, ibuprofen, Aleve or Advil or Celebrex as they can affect your kidney function; you can use Tylenol as needed for pain or fevers if you have no liver problems  -avoid medications such as Sudafed or other medications with decongestants as they can raise your blood pressure  -try to lose 5-10 lb by your next visit          Subjective:   Overall feeling well  She was referred because her creatinine was slightly higher than typical   Was 1 78 in September  It had been about 1 3 about a year ago  As recalled she is status post nephrectomy for renal cell C A  She currently denies any urinary symptoms including dysuria hematuria voiding symptoms or foamy urine  She denies any fevers chills cough or colds  She has no chest pain, chronic shortness of breath on oxygen but stable, and she denies any significant swelling  She did state that she had her furosemide reduced from 80 mg to 40 mg and she notes a different in terms of the urine output an absolute believe she needs to the 80 mg  She denies any headaches, dizziness or lightheadedness  No gastrointestinal symptoms as well including nausea vomiting or diarrhea  Blood pressure medications:  -Toprol XL 25 mg daily  -furosemide 40 mg daily  Lisinopril has been stopped approximately 4 weeks ago because of an elevated creatinine  Furosemide was decreased as outlined from 80 to40 mg about a month ago  ROS:  See HPI, otherwise review of systems as completely reviewed with the patient are negative     reports that she has never smoked  She has never used smokeless tobacco  She reports that she does not drink alcohol or use drugs      I COMPLETELY REVIEWED THE PAST MEDICAL HISTORY/PAST SURGICAL HISTORY/SOCIAL HISTORY/FAMILY HISTORY/AND MEDICATIONS  AND UPDATED ALL    Objective:     Vitals:   Vitals:    10/04/18 1309   BP: 120/72   Pulse: 72    BP on left forearm 118/72 with a heart rate 78 and slightly irregular    Weight (last 2 days)     Date/Time   Weight    10/04/18 1309  --    Weight: WHEELCHAIR at 10/04/18 1309            Body mass index is 53 55 kg/m²  Physical Exam: General:  Morbidly obese, but in no acute distress; on oxygen and wheelchair  Skin:  No acute rash  Eyes:  No scleral icterus, noninjected  ENT:  Moist mucous membranes  Neck:  Supple, no jugular venous distention  Back   No CVAT  Chest:  Clear to auscultation and percussion  CVS:  Regular rate and rhythm without a rub, murmurs or gallops  Abdomen:   Morbidly obese and a very large pannus, soft and nontender with normal bowel sounds  Extremities:  No cyanosis; only trace lower extremity edema with severe pretibial venous stasis changes  Neuro:  Grossly intact  Psych:  Alert, oriented x3 and appropriate      Medications:    Current Outpatient Prescriptions:     atorvastatin (LIPITOR) 10 mg tablet, Take 1 tablet by mouth daily, Disp: , Rfl:     Cholecalciferol (VITAMIN D) 2000 units tablet, Take 2 capsules by mouth daily, Disp: , Rfl:     cyanocobalamin (VITAMIN B-12) 500 mcg tablet, , Disp: , Rfl: 0    folic acid (FOLVITE) 1 mg tablet, Take 1,000 mcg by mouth daily, Disp: , Rfl: 0    furosemide (LASIX) 40 mg tablet, Take 40 mg by mouth daily, Disp: , Rfl: 0    gabapentin, once-daily, (GRALISE) 300 MG tablet, Take 1 tablet by mouth 3 (three) times a day, Disp: , Rfl:     glimepiride (AMARYL) 1 mg tablet, Take 1 mg by mouth daily, Disp: , Rfl: 0    JANUMET  MG per tablet, , Disp: , Rfl: 0    levothyroxine 88 mcg tablet, Take 1 tablet by mouth daily, Disp: , Rfl:     metoprolol succinate (TOPROL-XL) 25 mg 24 hr tablet, Take 25 mg by mouth daily, Disp: , Rfl: 0    RA VITAMIN D-3 2000 units CAPS, take 2 capsules by mouth daily, Disp: 60 capsule, Rfl: 5    VENTOLIN  (90 Base) MCG/ACT inhaler, Inhale 2 puffs every 4 (four) hours, Disp: , Rfl: 0    warfarin (COUMADIN) 5 mg tablet, Take by mouth, Disp: , Rfl:     Lab, Imaging and other studies: I have personally reviewed pertinent labs  Laboratory Results:  Results for orders placed or performed in visit on 09/25/18   PTH, Intact and Calcium   Result Value Ref Range    PTH, Intact 44 14 - 64 pg/mL    SL AMB CALCIUM 8 9 8 6 - 10 4 mg/dL               Radiology review:   chest X-ray    Ultrasound      Portions of the record may have been created with voice recognition software   Occasional wrong word or "sound a like" substitutions may have occurred due to the inherent limitations of voice recognition software   Read the chart carefully and recognize, using context, where substitutions have occurred

## 2018-10-04 NOTE — LETTER
October 4, 2018     Laith Ramires MD  [facility]    Patient: Cintia Knight   YOB: 1960   Date of Visit: 10/4/2018       Dear Dr Kenna Del Rio:    Thank you for referring Marybeth Sanders to me for evaluation  Below are my notes for this consultation  If you have questions, please do not hesitate to call me  I look forward to following your patient along with you  Sincerely,        Cristian Yoo MD        CC: MD Kelsea Negrete MD Wendelyn Mattes, MD  10/4/2018  1:46 PM  Incomplete  RENAL FOLLOW UP NOTE: td    ASSESSMENT AND PLAN:  1   CKD stage 3 :  · Etiology:  Right radical nephrectomy July 15, 2016 for renal cell CA/hypertensive nephrosclerosis/arteriolar nephrosclerosis/diabetic nephropathy/?  Morbid obesity with? FSGS  · Baseline creatinine:  Approximately 1 3  · Current creatinine:  1 49  This is improved from a creatinine 1 78 in September  · Urine protein creatinine ratio:  NEEDS TO BE OBTAINED  Recommendations:  · Treat hypertension-please see below  · Treat dyslipidemia-please see below  · Maintain proteinuria less than 1 g or as low as possible  · Avoid nephrotoxic agents such as NSAIDs, patient counseled as such  2   Volume:  Close to euvolemic with only minimal edema but the patient has noted decreased urine output significantly on the lower dose of furosemide  3   Hypertension:  None done home at this time    · Goal blood pressure:  Less than 130/80  Recommendations:  ·   Blood pressure appears stable in the office today so no changes on the current regimen but hold on lisinopril specially given the most recent increase in creatinine; pending urine protein creatinine ratio  4   Electrolytes:  Chronic metabolic alkalosis most likely from primary CO2 retention  Has been stable  5   Mineral bone disorder:  · Calcium/magnesium/phosphorus:  All acceptable  · PTH intact:  44 at goal  · Vitamin-D:  66 at goal  6    Dyslipidemia:  · Goal LDL:  Less than 100  · Current lipid profile:  None at this time     Recommendations:  TO OBTAIN LIPID PROFILE  7  Anemia:  Chronic anemia with a hemoglobin of 8 9  This has been drifting down since last year  Iron studies have historically been low  Reviewing records from January, she had a hemoglobin at that time of 7 associated with an INR of 10  She was transfused both with plasma and packed red blood cells  She is noted to have B12 deficiency and had received B12 injections  Also found to have low iron studies and low folic acid  Currently receiving R81 and folic acid  She was also started on iron  Unfortunately she cannot take oral iron because of GI side effects  -B12 and folate deficiency being treated  -multiple myeloma was ruled out in January  -GI evaluation in January of 2018:   colonoscopy demonstrated internal/external hemorrhoids; also colonic polyps which were removed  There was a suboptimal colonic prep  Felt she may require repeat colonoscopy in 3-6 months  EGD demonstrated small hiatal hernia  Recommendations:  -GI follow-up with Dr Garza  -check iron studies, may require intravenous iron  8  Other problems:  · Right radical nephrectomy for renal cell CA July 15, 2016  · 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 2, 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  · Morbid obesity  · Atrial fibrillation  · Cellulitis of the left leg back in January treated with intravenous antibiotics  · COPD on iron  · EGD involving multiple joints  · Hypothyroidism on supplement  · History of gait dysfunction using motorized scooter to ambulate  · Diabetes mellitus/diabetic neuropathy          PATIENT INSTRUCTIONS:    Patient Instructions   1  Medication changes today:  -please go back to the Lasix 80 mg once a day  2  We will order labs 1 week to 2 weeks after making the medication adjustment  We will try to get the labs in the morning because we have to get a urine specimen as well    3   We will arrange for an ultrasound of the kidney to make sure it is not blocked  4  Follow-up the gastroenterology Dr Garza  5  Follow-up in 4 months:  -we will arrange for lab work prior to your appointment  6  General instructions:  -avoid salt  -avoid medications such as Motrin, Naprosyn, ibuprofen, Aleve or Advil or Celebrex as they can affect your kidney function; you can use Tylenol as needed for pain or fevers if you have no liver problems  -avoid medications such as Sudafed or other medications with decongestants as they can raise your blood pressure  -try to lose 5-10 lb by your next visit          Subjective:   Overall feeling well  She was referred because her creatinine was slightly higher than typical   Was 1 78 in September  It had been about 1 3 about a year ago  As recalled she is status post nephrectomy for renal cell C A  She currently denies any urinary symptoms including dysuria hematuria voiding symptoms or foamy urine  She denies any fevers chills cough or colds  She has no chest pain, chronic shortness of breath on oxygen but stable, and she denies any significant swelling  She did state that she had her furosemide reduced from 80 mg to 40 mg and she notes a different in terms of the urine output an absolute believe she needs to the 80 mg  She denies any headaches, dizziness or lightheadedness  No gastrointestinal symptoms as well including nausea vomiting or diarrhea  Blood pressure medications:  -Toprol XL 25 mg daily  -furosemide 40 mg daily  Lisinopril has been stopped approximately 4 weeks ago because of an elevated creatinine  Furosemide was decreased as outlined from 80 to40 mg about a month ago  ROS:  See HPI, otherwise review of systems as completely reviewed with the patient are negative     reports that she has never smoked  She has never used smokeless tobacco  She reports that she does not drink alcohol or use drugs      I COMPLETELY REVIEWED THE PAST MEDICAL HISTORY/PAST SURGICAL HISTORY/SOCIAL HISTORY/FAMILY HISTORY/AND MEDICATIONS  AND UPDATED ALL    Objective:     Vitals:   Vitals:    10/04/18 1309   BP: 120/72   Pulse: 72    BP on left forearm 118/72 with a heart rate 78 and slightly irregular    Weight (last 2 days)     Date/Time   Weight    10/04/18 1309  --    Weight: WHEELCHAIR at 10/04/18 1309            Body mass index is 53 55 kg/m²  Physical Exam: General:  Morbidly obese, but in no acute distress; on oxygen and wheelchair  Skin:  No acute rash  Eyes:  No scleral icterus, noninjected  ENT:  Moist mucous membranes  Neck:  Supple, no jugular venous distention  Back   No CVAT  Chest:  Clear to auscultation and percussion  CVS:  Regular rate and rhythm without a rub, murmurs or gallops  Abdomen:   Morbidly obese and a very large pannus, soft and nontender with normal bowel sounds  Extremities:  No cyanosis; only trace lower extremity edema with severe pretibial venous stasis changes  Neuro:  Grossly intact  Psych:  Alert, oriented x3 and appropriate      Medications:    Current Outpatient Prescriptions:     atorvastatin (LIPITOR) 10 mg tablet, Take 1 tablet by mouth daily, Disp: , Rfl:     Cholecalciferol (VITAMIN D) 2000 units tablet, Take 2 capsules by mouth daily, Disp: , Rfl:     cyanocobalamin (VITAMIN B-12) 500 mcg tablet, , Disp: , Rfl: 0    folic acid (FOLVITE) 1 mg tablet, Take 1,000 mcg by mouth daily, Disp: , Rfl: 0    furosemide (LASIX) 40 mg tablet, Take 40 mg by mouth daily, Disp: , Rfl: 0    gabapentin, once-daily, (GRALISE) 300 MG tablet, Take 1 tablet by mouth 3 (three) times a day, Disp: , Rfl:     glimepiride (AMARYL) 1 mg tablet, Take 1 mg by mouth daily, Disp: , Rfl: 0    JANUMET  MG per tablet, , Disp: , Rfl: 0    levothyroxine 88 mcg tablet, Take 1 tablet by mouth daily, Disp: , Rfl:     metoprolol succinate (TOPROL-XL) 25 mg 24 hr tablet, Take 25 mg by mouth daily, Disp: , Rfl: 0    RA VITAMIN D-3 2000 units CAPS, take 2 capsules by mouth daily, Disp: 60 capsule, Rfl: 5    VENTOLIN  (90 Base) MCG/ACT inhaler, Inhale 2 puffs every 4 (four) hours, Disp: , Rfl: 0    warfarin (COUMADIN) 5 mg tablet, Take by mouth, Disp: , Rfl:     Lab, Imaging and other studies: I have personally reviewed pertinent labs  Laboratory Results:  Results for orders placed or performed in visit on 09/25/18   PTH, Intact and Calcium   Result Value Ref Range    PTH, Intact 44 14 - 64 pg/mL    SL AMB CALCIUM 8 9 8 6 - 10 4 mg/dL               Radiology review:   chest X-ray    Ultrasound      Portions of the record may have been created with voice recognition software   Occasional wrong word or "sound a like" substitutions may have occurred due to the inherent limitations of voice recognition software   Read the chart carefully and recognize, using context, where substitutions have occurred  Damien Morejon MD  10/4/2018 10:30 AM  Sign at close encounter  RENAL FOLLOW UP NOTE: td    ASSESSMENT AND PLAN:  1   CKD stage 3 :  · Etiology:  Right radical nephrectomy July 15, 2016 for renal cell CA/hypertensive nephrosclerosis/arteriolar nephrosclerosis/diabetic nephropathy/?  Morbid obesity with? FSGS  · Baseline creatinine:  Approximately 1 3  · Current creatinine:  1 49  This is improved from a creatinine 1 78 in September  · Urine protein creatinine ratio:  NEEDS TO BE OBTAINED  Recommendations:  · Treat hypertension-please see below  · Treat dyslipidemia-please see below  · Maintain proteinuria less than 1 g or as low as possible  · Avoid nephrotoxic agents such as NSAIDs, patient counseled as such  2   Volume:***  3   Hypertension:       Current blood pressure averages:  AM:***  PM:***    · Goal blood pressure:***  Recommendations:  ·   ***  4   Electrolytes:  Chronic metabolic alkalosis most likely from primary CO2 retention  Has been stable    5   Mineral bone disorder:  · Calcium/magnesium/phosphorus:  All acceptable  · PTH intact:  44 at goal  · Vitamin-D:  66 at goal  6  Dyslipidemia:  · Goal LDL:  Less than 100  · Current lipid profile:  None at this time     Recommendations:  TO OBTAIN LIPID PROFILE  7  Anemia:  Chronic anemia with a hemoglobin of 8 9  This has been drifting down since last year  Iron studies have historically been low   -check iron studies  -check multiple myeloma evaluation  -check a folate/B12 possible  -check stool for occult blood  8  Other problems:  · Right radical nephrectomy for renal cell CA July 15, 2016  · 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 2, 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  · Morbid obesity  · Atrial fibrillation          PATIENT INSTRUCTIONS:    There are no Patient Instructions on file for this visit  Subjective:   ***    ROS:  See HPI, otherwise review of systems as completely reviewed with the patient are negative         I COMPLETELY REVIEWED THE PAST MEDICAL HISTORY/PAST SURGICAL HISTORY/SOCIAL HISTORY/FAMILY HISTORY/AND MEDICATIONS  AND UPDATED ALL    Objective:     Vitals: There were no vitals filed for this visit  Weight (last 2 days)     None        There is no height or weight on file to calculate BMI  Physical Exam: General:  No acute distress  Skin:  No acute rash  Eyes:  No scleral icterus, noninjected  ENT:  Moist mucous membranes  Neck:  Supple, no jugular venous distention  Back   No CVAT  Chest:  Clear to auscultation and percussion  CVS:  Regular rate and rhythm without a rub, murmurs or gallops  Abdomen:  Soft and nontender with normal bowel sounds  Extremities:  No cyanosis and no edema  Neuro:  Grossly intact  Psych:  Alert, oriented x3 and appropriate      Medications:    Current Outpatient Prescriptions:     RA VITAMIN D-3 2000 units CAPS, take 2 capsules by mouth daily, Disp: 60 capsule, Rfl: 5    Lab, Imaging and other studies: I have personally reviewed pertinent labs    Laboratory Results:  Results for orders placed or performed in visit on 09/25/18 PTH, Intact and Calcium   Result Value Ref Range    PTH, Intact 44 14 - 64 pg/mL    SL AMB CALCIUM 8 9 8 6 - 10 4 mg/dL               Radiology review:   chest X-ray    Ultrasound      Portions of the record may have been created with voice recognition software   Occasional wrong word or "sound a like" substitutions may have occurred due to the inherent limitations of voice recognition software   Read the chart carefully and recognize, using context, where substitutions have occurred

## 2018-10-04 NOTE — PATIENT INSTRUCTIONS
1   Medication changes today:  -please go back to the Lasix 80 mg once a day  2  We will order labs 1 week to 2 weeks after making the medication adjustment  We will try to get the labs in the morning because we have to get a urine specimen as well  3   We will arrange for an ultrasound of the kidney to make sure it is not blocked  4  Follow-up the gastroenterology Dr Garza  5  Follow-up in 4 months:  -we will arrange for lab work prior to your appointment  6  General instructions:  -avoid salt  -avoid medications such as Motrin, Naprosyn, ibuprofen, Aleve or Advil or Celebrex as they can affect your kidney function; you can use Tylenol as needed for pain or fevers if you have no liver problems  -avoid medications such as Sudafed or other medications with decongestants as they can raise your blood pressure  -try to lose 5-10 lb by your next visit

## 2018-10-19 ENCOUNTER — TELEPHONE (OUTPATIENT)
Dept: NEPHROLOGY | Facility: CLINIC | Age: 58
End: 2018-10-19

## 2018-10-19 LAB
BASOPHILS # BLD AUTO: 30 CELLS/UL (ref 0–200)
BASOPHILS NFR BLD AUTO: 0.4 %
BUN SERPL-MCNC: 21 MG/DL (ref 7–25)
BUN/CREAT SERPL: 14 (CALC) (ref 6–22)
CALCIUM SERPL-MCNC: 8.7 MG/DL (ref 8.6–10.4)
CHLORIDE SERPL-SCNC: 101 MMOL/L (ref 98–110)
CK SERPL-CCNC: 30 U/L (ref 29–143)
CO2 SERPL-SCNC: 40 MMOL/L (ref 20–32)
CREAT SERPL-MCNC: 1.49 MG/DL (ref 0.5–1.05)
CREAT UR-MCNC: 91 MG/DL (ref 20–275)
EOSINOPHIL # BLD AUTO: 163 CELLS/UL (ref 15–500)
EOSINOPHIL NFR BLD AUTO: 2.2 %
ERYTHROCYTE [DISTWIDTH] IN BLOOD BY AUTOMATED COUNT: 15.4 % (ref 11–15)
FERRITIN SERPL-MCNC: 23 NG/ML (ref 10–232)
GLUCOSE SERPL-MCNC: 77 MG/DL (ref 65–99)
HCT VFR BLD AUTO: 27.4 % (ref 35–45)
HGB BLD-MCNC: 8.3 G/DL (ref 11.7–15.5)
IRON SATN MFR SERPL: 12 % (CALC) (ref 11–50)
IRON SERPL-MCNC: 43 MCG/DL (ref 45–160)
LYMPHOCYTES # BLD AUTO: 836 CELLS/UL (ref 850–3900)
LYMPHOCYTES NFR BLD AUTO: 11.3 %
MCH RBC QN AUTO: 29.6 PG (ref 27–33)
MCHC RBC AUTO-ENTMCNC: 30.3 G/DL (ref 32–36)
MCV RBC AUTO: 97.9 FL (ref 80–100)
MONOCYTES # BLD AUTO: 466 CELLS/UL (ref 200–950)
MONOCYTES NFR BLD AUTO: 6.3 %
NEUTROPHILS # BLD AUTO: 5905 CELLS/UL (ref 1500–7800)
NEUTROPHILS NFR BLD AUTO: 79.8 %
PLATELET # BLD AUTO: 320 THOUSAND/UL (ref 140–400)
PMV BLD REES-ECKER: 10.2 FL (ref 7.5–12.5)
POTASSIUM SERPL-SCNC: 4.1 MMOL/L (ref 3.5–5.3)
PROT UR-MCNC: 90 MG/DL (ref 5–24)
PROT/CREAT UR: 989 MG/G CREAT (ref 21–161)
RBC # BLD AUTO: 2.8 MILLION/UL (ref 3.8–5.1)
SL AMB EGFR AFRICAN AMERICAN: 44 ML/MIN/1.73M2
SL AMB EGFR NON AFRICAN AMERICAN: 38 ML/MIN/1.73M2
SODIUM SERPL-SCNC: 145 MMOL/L (ref 135–146)
TIBC SERPL-MCNC: 357 MCG/DL (CALC) (ref 250–450)
WBC # BLD AUTO: 7.4 THOUSAND/UL (ref 3.8–10.8)

## 2018-10-19 NOTE — TELEPHONE ENCOUNTER
Pt scheduled for infusions [facility]) on October 25th at [address] and November 1st at 10am

## 2018-10-19 NOTE — TELEPHONE ENCOUNTER
----- Message from Larisa Scott MD sent at 10/19/2018  8:38 AM EDT -----  Low iron studies  Recommendations:  1    Feraheme 510 mg x2 separate infusion

## 2018-12-28 ENCOUNTER — TELEPHONE (OUTPATIENT)
Dept: NEPHROLOGY | Facility: CLINIC | Age: 58
End: 2018-12-28

## 2018-12-28 NOTE — TELEPHONE ENCOUNTER
I called and left message for patient to call back to schedule February follow up appt with Dr Jocelyne Michelle

## 2019-01-14 DIAGNOSIS — N18.30 CKD (CHRONIC KIDNEY DISEASE) STAGE 3, GFR 30-59 ML/MIN (HCC): ICD-10-CM

## 2019-01-14 RX ORDER — GLUCOSAMINE/CHONDR SU A SOD 750-600 MG
TABLET ORAL
Qty: 60 CAPSULE | Refills: 5 | Status: SHIPPED | OUTPATIENT
Start: 2019-01-14 | End: 2020-06-25 | Stop reason: SDUPTHER

## 2019-02-06 LAB
ALBUMIN SERPL-MCNC: 3.6 G/DL (ref 3.6–5.1)
ALBUMIN/GLOB SERPL: 1 (CALC) (ref 1–2.5)
ALP SERPL-CCNC: 117 U/L (ref 33–130)
ALT SERPL-CCNC: 8 U/L (ref 6–29)
AST SERPL-CCNC: 12 U/L (ref 10–35)
BASOPHILS # BLD AUTO: 42 CELLS/UL (ref 0–200)
BASOPHILS NFR BLD AUTO: 0.5 %
BILIRUB SERPL-MCNC: 0.5 MG/DL (ref 0.2–1.2)
BUN SERPL-MCNC: 24 MG/DL (ref 7–25)
BUN/CREAT SERPL: 14 (CALC) (ref 6–22)
CALCIUM SERPL-MCNC: 8.9 MG/DL (ref 8.6–10.4)
CHLORIDE SERPL-SCNC: 94 MMOL/L (ref 98–110)
CK SERPL-CCNC: 48 U/L (ref 29–143)
CO2 SERPL-SCNC: 40 MMOL/L (ref 20–32)
CREAT SERPL-MCNC: 1.75 MG/DL (ref 0.5–1.05)
CREAT UR-MCNC: 24 MG/DL (ref 20–275)
EOSINOPHIL # BLD AUTO: 282 CELLS/UL (ref 15–500)
EOSINOPHIL NFR BLD AUTO: 3.4 %
ERYTHROCYTE [DISTWIDTH] IN BLOOD BY AUTOMATED COUNT: 15 % (ref 11–15)
FERRITIN SERPL-MCNC: 186 NG/ML (ref 10–232)
GLOBULIN SER CALC-MCNC: 3.6 G/DL (CALC) (ref 1.9–3.7)
GLUCOSE SERPL-MCNC: 91 MG/DL (ref 65–99)
HCT VFR BLD AUTO: 30.4 % (ref 35–45)
HGB BLD-MCNC: 9.7 G/DL (ref 11.7–15.5)
IRON SATN MFR SERPL: 36 % (CALC) (ref 11–50)
IRON SERPL-MCNC: 103 MCG/DL (ref 45–160)
LYMPHOCYTES # BLD AUTO: 1029 CELLS/UL (ref 850–3900)
LYMPHOCYTES NFR BLD AUTO: 12.4 %
MAGNESIUM SERPL-MCNC: 1.7 MG/DL (ref 1.5–2.5)
MCH RBC QN AUTO: 30.8 PG (ref 27–33)
MCHC RBC AUTO-ENTMCNC: 31.9 G/DL (ref 32–36)
MCV RBC AUTO: 96.5 FL (ref 80–100)
MONOCYTES # BLD AUTO: 490 CELLS/UL (ref 200–950)
MONOCYTES NFR BLD AUTO: 5.9 %
NEUTROPHILS # BLD AUTO: 6457 CELLS/UL (ref 1500–7800)
NEUTROPHILS NFR BLD AUTO: 77.8 %
PHOSPHATE SERPL-MCNC: 3.2 MG/DL (ref 2.5–4.5)
PLATELET # BLD AUTO: 331 THOUSAND/UL (ref 140–400)
PMV BLD REES-ECKER: 9.5 FL (ref 7.5–12.5)
POTASSIUM SERPL-SCNC: 4.3 MMOL/L (ref 3.5–5.3)
PROT SERPL-MCNC: 7.2 G/DL (ref 6.1–8.1)
PROT UR-MCNC: 13 MG/DL (ref 5–24)
PROT/CREAT UR: 542 MG/G CREAT (ref 21–161)
RBC # BLD AUTO: 3.15 MILLION/UL (ref 3.8–5.1)
SL AMB EGFR AFRICAN AMERICAN: 36 ML/MIN/1.73M2
SL AMB EGFR NON AFRICAN AMERICAN: 31 ML/MIN/1.73M2
SODIUM SERPL-SCNC: 141 MMOL/L (ref 135–146)
TIBC SERPL-MCNC: 285 MCG/DL (CALC) (ref 250–450)
WBC # BLD AUTO: 8.3 THOUSAND/UL (ref 3.8–10.8)

## 2019-02-07 PROBLEM — E78.5 DYSLIPIDEMIA: Status: ACTIVE | Noted: 2019-02-07

## 2019-02-09 DIAGNOSIS — N18.30 CKD (CHRONIC KIDNEY DISEASE) STAGE 3, GFR 30-59 ML/MIN (HCC): ICD-10-CM

## 2019-02-11 RX ORDER — GLUCOSAMINE/CHONDR SU A SOD 750-600 MG
TABLET ORAL
Qty: 60 CAPSULE | Refills: 5 | Status: SHIPPED | OUTPATIENT
Start: 2019-02-11 | End: 2019-07-13 | Stop reason: SDUPTHER

## 2019-02-12 LAB
CALCIUM SERPL-MCNC: 9 MG/DL (ref 8.6–10.4)
PTH-INTACT SERPL-MCNC: 104 PG/ML (ref 14–64)

## 2019-07-13 DIAGNOSIS — N18.30 CKD (CHRONIC KIDNEY DISEASE) STAGE 3, GFR 30-59 ML/MIN (HCC): ICD-10-CM

## 2019-07-14 RX ORDER — GLUCOSAMINE/CHONDR SU A SOD 750-600 MG
TABLET ORAL
Qty: 60 CAPSULE | Refills: 5 | Status: SHIPPED | OUTPATIENT
Start: 2019-07-14 | End: 2020-09-09

## 2019-11-01 LAB — HBA1C MFR BLD HPLC: <6 %

## 2019-11-06 ENCOUNTER — TELEPHONE (OUTPATIENT)
Dept: NEPHROLOGY | Facility: CLINIC | Age: 59
End: 2019-11-06

## 2019-11-06 DIAGNOSIS — R80.1 PERSISTENT PROTEINURIA: ICD-10-CM

## 2019-11-06 DIAGNOSIS — N18.30 ANEMIA OF CHRONIC RENAL FAILURE, STAGE 3 (MODERATE) (HCC): Primary | ICD-10-CM

## 2019-11-06 DIAGNOSIS — N18.30 CHRONIC KIDNEY DISEASE, STAGE III (MODERATE) (HCC): ICD-10-CM

## 2019-11-06 DIAGNOSIS — D63.1 ANEMIA OF CHRONIC RENAL FAILURE, STAGE 3 (MODERATE) (HCC): Primary | ICD-10-CM

## 2019-11-06 NOTE — TELEPHONE ENCOUNTER
----- Message from Mane Ibarra MD sent at 11/5/2019  8:12 AM EST -----  Thanks! For the [facility] staff:  Could you make sure she sees 1 of the advanced practitioner's this coming week and gets a basic metabolic profile/CBC/iron studies/magnesium phosphorus now  If she can be seen later this week that would be great as well possibly by 1 of the advanced practitioner's but next week would be just as good if we get labs this week  ----- Message -----  From: Betty Ugalde MD  Sent: 11/5/2019   8:01 AM EST  To: Mane Ibarra MD, [facility] was admitted to [facility] between October 26, 2019 and October 31, 2019 after presenting with a change in mental status  We are consulted for RADHA  Admission creatinine was 2 99 on 10/26/19 but had risen to 3 28 on 10/28/19  Etiology felt to be prerenal vs ATN  She was discharged with an improving creatinine - down to 2 45 on 10/31/19 (Baseline is 1 5 to 1 8)  Furosemide was restarted at a lower dose at the time of discharge - 40 mg daily  The etiology of the change in mental status was hypoglycemia vs CO2 retention  I messaged the girls to arrange for office follow up  Of note, she was anemic in the hospital but Hgb was stable  I could not work it up as I saw her for only 1 day and she was on deck for discharge by the time I walked in  I did order some iron testing for when she follows up

## 2019-11-06 NOTE — TELEPHONE ENCOUNTER
I called patient and Lm for her to confirm her appointmentt scheduled for 11/11/19 at 9:10Am with Yvrose Lim  If pt is not able to make that appointment we can reschedule her  Will send Pt blood work to have done prior to appointment, if she is able to have them done at [facility]

## 2019-11-07 NOTE — TELEPHONE ENCOUNTER
Family member called, pt in rehab, can't schedule appts now, they will call when she is out and better

## 2019-11-27 ENCOUNTER — TELEPHONE (OUTPATIENT)
Dept: HEMATOLOGY ONCOLOGY | Facility: CLINIC | Age: 59
End: 2019-11-27

## 2019-11-27 NOTE — TELEPHONE ENCOUNTER
New Patient Encounter    New Patient Intake Form   Patient Details:  Lizzie Downing  1960  [id number]    Background Information:  [address] starts by opening a telephone encounter and gathering the following information   Who is calling to schedule? If not self, relationship to patient? facility   Referring Provider ESHA Crane   What is the diagnosis? Anemia CKD   When was the diagnosis? 11/2019   Is patient aware of diagnosis? Yes   Reason for visit? NP DX   Have you had any testing done? If so: when, where? Yes   Are records in [facility]? yes   Was the patient told to bring a disk? no   Scheduling Information:   Preferred [place]: [facility]     Requesting Specific Provider? no   Are there any dates/time the patient cannot be seen? no      Miscellaneous: pt will be transported by squad   After completing the above information, please route to Financial Counselor and the appropriate Nurse Navigator for review

## 2019-12-11 ENCOUNTER — TELEPHONE (OUTPATIENT)
Dept: NEPHROLOGY | Facility: CLINIC | Age: 59
End: 2019-12-11

## 2019-12-11 DIAGNOSIS — N18.30 ANEMIA OF CHRONIC RENAL FAILURE, STAGE 3 (MODERATE) (HCC): Primary | ICD-10-CM

## 2019-12-11 DIAGNOSIS — D63.1 ANEMIA OF CHRONIC RENAL FAILURE, STAGE 3 (MODERATE) (HCC): Primary | ICD-10-CM

## 2019-12-11 DIAGNOSIS — R80.1 PERSISTENT PROTEINURIA: ICD-10-CM

## 2019-12-11 DIAGNOSIS — E61.1 IRON DEFICIENCY: ICD-10-CM

## 2019-12-11 DIAGNOSIS — N18.30 CHRONIC KIDNEY DISEASE, STAGE III (MODERATE) (HCC): ICD-10-CM

## 2019-12-11 DIAGNOSIS — I12.9 HYPERTENSIVE CHRONIC KIDNEY DISEASE WITH STAGE 1 THROUGH STAGE 4 CHRONIC KIDNEY DISEASE, OR UNSPECIFIED CHRONIC KIDNEY DISEASE: ICD-10-CM

## 2019-12-11 NOTE — TELEPHONE ENCOUNTER
Dr Elisabeth Dunn, let me know what labs you would like her to have done before appointment and I will fax them  Piotr from [address] called to schedule patient for follow up, patient was seen in [facility] end of October so I scheduled patient for hospital follow up w/ [facility]  Patient was last seen with Dr Elisabeth Dunn in 2018       P- [id number] ext: [address] [facility]

## 2019-12-12 NOTE — TELEPHONE ENCOUNTER
CMP/magnesium/phosphorus/PTH intact level/vitamin-D level/urine protein creatinine ratio/CBC/iron studies

## 2019-12-19 ENCOUNTER — TELEPHONE (OUTPATIENT)
Dept: HEMATOLOGY ONCOLOGY | Facility: CLINIC | Age: 59
End: 2019-12-19

## 2020-01-23 ENCOUNTER — DOCUMENTATION (OUTPATIENT)
Dept: NEPHROLOGY | Facility: CLINIC | Age: 60
End: 2020-01-23

## 2020-01-23 LAB
EXT GLUCOSE BLD: 117
EXTERNAL ANION GAP: 10
EXTERNAL BUN: 25
EXTERNAL CALCIUM: 9.3
EXTERNAL CHLORIDE: 99
EXTERNAL CO2: 35
EXTERNAL CREATININE: 1.21
EXTERNAL EGFR: 49
EXTERNAL POTASSIUM: 3.1
EXTERNAL SODIUM: 141

## 2020-05-04 ENCOUNTER — TELEPHONE (OUTPATIENT)
Dept: INTERNAL MEDICINE CLINIC | Facility: CLINIC | Age: 60
End: 2020-05-04

## 2020-05-27 ENCOUNTER — TELEPHONE (OUTPATIENT)
Dept: FAMILY MEDICINE CLINIC | Facility: CLINIC | Age: 60
End: 2020-05-27

## 2020-06-01 ENCOUNTER — TELEPHONE (OUTPATIENT)
Dept: INTERNAL MEDICINE CLINIC | Facility: CLINIC | Age: 60
End: 2020-06-01

## 2020-06-01 DIAGNOSIS — I48.91 ATRIAL FIBRILLATION, UNSPECIFIED TYPE (HCC): Primary | ICD-10-CM

## 2020-06-02 ENCOUNTER — TELEPHONE (OUTPATIENT)
Dept: INTERNAL MEDICINE CLINIC | Facility: CLINIC | Age: 60
End: 2020-06-02

## 2020-06-03 ENCOUNTER — TELEPHONE (OUTPATIENT)
Dept: INTERNAL MEDICINE CLINIC | Facility: CLINIC | Age: 60
End: 2020-06-03

## 2020-06-06 LAB
INR PPP: 1.1
PROTHROMBIN TIME: 11.4 SEC (ref 9–11.5)

## 2020-06-12 LAB
INR PPP: 1.5
PROTHROMBIN TIME: 15 SEC (ref 9–11.5)

## 2020-06-24 ENCOUNTER — OFFICE VISIT (OUTPATIENT)
Dept: INTERNAL MEDICINE CLINIC | Facility: CLINIC | Age: 60
End: 2020-06-24
Payer: MEDICARE

## 2020-06-24 VITALS
BODY MASS INDEX: 46.61 KG/M2 | DIASTOLIC BLOOD PRESSURE: 74 MMHG | TEMPERATURE: 98.7 F | RESPIRATION RATE: 20 BRPM | SYSTOLIC BLOOD PRESSURE: 130 MMHG | WEIGHT: 273 LBS | HEIGHT: 64 IN | HEART RATE: 96 BPM | OXYGEN SATURATION: 96 %

## 2020-06-24 DIAGNOSIS — I48.91 ATRIAL FIBRILLATION, UNSPECIFIED TYPE (HCC): ICD-10-CM

## 2020-06-24 DIAGNOSIS — E11.43 TYPE 2 DIABETES MELLITUS WITH DIABETIC AUTONOMIC NEUROPATHY, WITHOUT LONG-TERM CURRENT USE OF INSULIN (HCC): Primary | ICD-10-CM

## 2020-06-24 DIAGNOSIS — I10 ESSENTIAL HYPERTENSION: ICD-10-CM

## 2020-06-24 DIAGNOSIS — E03.9 ACQUIRED HYPOTHYROIDISM: ICD-10-CM

## 2020-06-24 DIAGNOSIS — E78.2 MIXED HYPERLIPIDEMIA: ICD-10-CM

## 2020-06-24 DIAGNOSIS — E21.3 HYPERPARATHYROIDISM (HCC): ICD-10-CM

## 2020-06-24 PROCEDURE — 3078F DIAST BP <80 MM HG: CPT | Performed by: INTERNAL MEDICINE

## 2020-06-24 PROCEDURE — 3066F NEPHROPATHY DOC TX: CPT | Performed by: INTERNAL MEDICINE

## 2020-06-24 PROCEDURE — 3075F SYST BP GE 130 - 139MM HG: CPT | Performed by: INTERNAL MEDICINE

## 2020-06-24 PROCEDURE — 1036F TOBACCO NON-USER: CPT | Performed by: INTERNAL MEDICINE

## 2020-06-24 PROCEDURE — 99214 OFFICE O/P EST MOD 30 MIN: CPT | Performed by: INTERNAL MEDICINE

## 2020-06-24 RX ORDER — IRON POLYSACCHARIDE COMPLEX 150 MG
150 CAPSULE ORAL 2 TIMES DAILY
COMMUNITY
End: 2020-06-25 | Stop reason: SDUPTHER

## 2020-06-24 RX ORDER — ALBUTEROL SULFATE 2.5 MG/3ML
2.5 SOLUTION RESPIRATORY (INHALATION) EVERY 6 HOURS PRN
COMMUNITY
End: 2020-08-03 | Stop reason: SDUPTHER

## 2020-06-24 RX ORDER — PANTOPRAZOLE SODIUM 40 MG/1
40 TABLET, DELAYED RELEASE ORAL DAILY
COMMUNITY
End: 2020-06-25 | Stop reason: SDUPTHER

## 2020-06-25 DIAGNOSIS — E78.2 MIXED HYPERLIPIDEMIA: ICD-10-CM

## 2020-06-25 DIAGNOSIS — I48.91 ATRIAL FIBRILLATION, UNSPECIFIED TYPE (HCC): ICD-10-CM

## 2020-06-25 DIAGNOSIS — I10 ESSENTIAL HYPERTENSION: ICD-10-CM

## 2020-06-25 DIAGNOSIS — E03.9 ACQUIRED HYPOTHYROIDISM: ICD-10-CM

## 2020-06-25 DIAGNOSIS — K21.9 GASTROESOPHAGEAL REFLUX DISEASE WITHOUT ESOPHAGITIS: Primary | ICD-10-CM

## 2020-06-25 DIAGNOSIS — N18.30 CKD (CHRONIC KIDNEY DISEASE) STAGE 3, GFR 30-59 ML/MIN (HCC): ICD-10-CM

## 2020-06-26 RX ORDER — CHOLECALCIFEROL (VITAMIN D3) 125 MCG
500 CAPSULE ORAL DAILY
Qty: 90 TABLET | Refills: 3 | Status: SHIPPED | OUTPATIENT
Start: 2020-06-26

## 2020-06-26 RX ORDER — METOPROLOL SUCCINATE 25 MG/1
25 TABLET, EXTENDED RELEASE ORAL DAILY
Qty: 90 TABLET | Refills: 3 | Status: SHIPPED | OUTPATIENT
Start: 2020-06-26 | End: 2021-03-23 | Stop reason: SDUPTHER

## 2020-06-26 RX ORDER — LEVOTHYROXINE SODIUM 88 UG/1
88 TABLET ORAL DAILY
Qty: 90 TABLET | Refills: 4 | Status: ON HOLD | OUTPATIENT
Start: 2020-06-26 | End: 2020-09-11 | Stop reason: SDUPTHER

## 2020-06-26 RX ORDER — PANTOPRAZOLE SODIUM 40 MG/1
40 TABLET, DELAYED RELEASE ORAL DAILY
Qty: 90 TABLET | Refills: 3 | Status: ON HOLD | OUTPATIENT
Start: 2020-06-26 | End: 2020-09-29

## 2020-06-26 RX ORDER — IRON POLYSACCHARIDE COMPLEX 150 MG
150 CAPSULE ORAL 2 TIMES DAILY
Qty: 180 CAPSULE | Refills: 3 | Status: SHIPPED | OUTPATIENT
Start: 2020-06-26 | End: 2020-08-03 | Stop reason: SDUPTHER

## 2020-06-26 RX ORDER — ATORVASTATIN CALCIUM 10 MG/1
10 TABLET, FILM COATED ORAL DAILY
Qty: 90 TABLET | Refills: 3 | Status: SHIPPED | OUTPATIENT
Start: 2020-06-26 | End: 2020-09-11 | Stop reason: HOSPADM

## 2020-06-26 RX ORDER — WARFARIN SODIUM 5 MG/1
5 TABLET ORAL
Qty: 90 TABLET | Refills: 3 | Status: ON HOLD | OUTPATIENT
Start: 2020-06-26 | End: 2020-09-11 | Stop reason: SDUPTHER

## 2020-07-01 ENCOUNTER — TELEPHONE (OUTPATIENT)
Dept: INTERNAL MEDICINE CLINIC | Facility: CLINIC | Age: 60
End: 2020-07-01

## 2020-07-01 NOTE — TELEPHONE ENCOUNTER
Patients medication was sent incorrectly  Levothyroxine was supposed to be 100 mcg not 88 mcg  Vitamin d3 was supposed to be 400 units not 2000  And Warfarin should have been 3 mg not 5 mg  Faye Board is very upset and doesn't want to have to pay for more medications

## 2020-07-02 NOTE — TELEPHONE ENCOUNTER
Patients daughter called back she is still very angry about the medications wondering when they will be corrected  Also for the labs she states they need to be faxed to the [address] [place] [address] in [place] so they can come draw her blood at home   Thank you

## 2020-07-13 LAB
ALBUMIN SERPL-MCNC: 3.3 G/DL (ref 3.6–5.1)
ALBUMIN/CREAT UR: 28 MCG/MG CREAT
ALBUMIN/GLOB SERPL: 0.9 (CALC) (ref 1–2.5)
ALP SERPL-CCNC: 151 U/L (ref 37–153)
ALT SERPL-CCNC: 7 U/L (ref 6–29)
APPEARANCE UR: CLEAR
AST SERPL-CCNC: 11 U/L (ref 10–35)
BACTERIA UR CULT: ABNORMAL
BACTERIA UR QL AUTO: ABNORMAL /HPF
BASOPHILS # BLD AUTO: 47 CELLS/UL (ref 0–200)
BASOPHILS NFR BLD AUTO: 0.6 %
BILIRUB SERPL-MCNC: 0.4 MG/DL (ref 0.2–1.2)
BILIRUB UR QL STRIP: NEGATIVE
BUN SERPL-MCNC: 28 MG/DL (ref 7–25)
BUN/CREAT SERPL: 15 (CALC) (ref 6–22)
CALCIUM SERPL-MCNC: 9 MG/DL (ref 8.6–10.4)
CHLORIDE SERPL-SCNC: 102 MMOL/L (ref 98–110)
CHOLEST SERPL-MCNC: 141 MG/DL
CHOLEST/HDLC SERPL: 5.6 (CALC)
CO2 SERPL-SCNC: 35 MMOL/L (ref 20–32)
COLOR UR: YELLOW
CREAT SERPL-MCNC: 1.82 MG/DL (ref 0.5–0.99)
CREAT UR-MCNC: 40 MG/DL (ref 20–275)
EOSINOPHIL # BLD AUTO: 203 CELLS/UL (ref 15–500)
EOSINOPHIL NFR BLD AUTO: 2.6 %
ERYTHROCYTE [DISTWIDTH] IN BLOOD BY AUTOMATED COUNT: 14.6 % (ref 11–15)
EST. AVERAGE GLUCOSE BLD GHB EST-MCNC: 131 (CALC)
EST. AVERAGE GLUCOSE BLD GHB EST-SCNC: 7.3 (CALC)
GLOBULIN SER CALC-MCNC: 3.7 G/DL (CALC) (ref 1.9–3.7)
GLUCOSE SERPL-MCNC: 91 MG/DL (ref 65–99)
GLUCOSE UR QL STRIP: NEGATIVE
HBA1C MFR BLD: 6.2 % OF TOTAL HGB
HCT VFR BLD AUTO: 32.7 % (ref 35–45)
HDLC SERPL-MCNC: 25 MG/DL
HGB BLD-MCNC: 10.1 G/DL (ref 11.7–15.5)
HGB UR QL STRIP: NEGATIVE
HYALINE CASTS #/AREA URNS LPF: ABNORMAL /LPF
INR PPP: 1.4
KETONES UR QL STRIP: NEGATIVE
LDLC SERPL CALC-MCNC: 90 MG/DL (CALC)
LEUKOCYTE ESTERASE UR QL STRIP: NEGATIVE
LYMPHOCYTES # BLD AUTO: 1069 CELLS/UL (ref 850–3900)
LYMPHOCYTES NFR BLD AUTO: 13.7 %
MCH RBC QN AUTO: 29.5 PG (ref 27–33)
MCHC RBC AUTO-ENTMCNC: 30.9 G/DL (ref 32–36)
MCV RBC AUTO: 95.6 FL (ref 80–100)
MICROALBUMIN UR-MCNC: 1.1 MG/DL
MONOCYTES # BLD AUTO: 507 CELLS/UL (ref 200–950)
MONOCYTES NFR BLD AUTO: 6.5 %
NEUTROPHILS # BLD AUTO: 5975 CELLS/UL (ref 1500–7800)
NEUTROPHILS NFR BLD AUTO: 76.6 %
NITRITE UR QL STRIP: NEGATIVE
NONHDLC SERPL-MCNC: 116 MG/DL (CALC)
PH UR STRIP: 6.5 [PH] (ref 5–8)
PLATELET # BLD AUTO: 329 THOUSAND/UL (ref 140–400)
PMV BLD REES-ECKER: 9.9 FL (ref 7.5–12.5)
POTASSIUM SERPL-SCNC: 4.6 MMOL/L (ref 3.5–5.3)
PROT SERPL-MCNC: 7 G/DL (ref 6.1–8.1)
PROT UR QL STRIP: NEGATIVE
PROTHROMBIN TIME: 13.9 SEC (ref 9–11.5)
RBC # BLD AUTO: 3.42 MILLION/UL (ref 3.8–5.1)
RBC #/AREA URNS HPF: ABNORMAL /HPF
SL AMB EGFR AFRICAN AMERICAN: 34 ML/MIN/1.73M2
SL AMB EGFR NON AFRICAN AMERICAN: 29 ML/MIN/1.73M2
SODIUM SERPL-SCNC: 140 MMOL/L (ref 135–146)
SP GR UR STRIP: 1.01 (ref 1–1.03)
SQUAMOUS #/AREA URNS HPF: ABNORMAL /HPF
TRIGL SERPL-MCNC: 160 MG/DL
TSH SERPL-ACNC: 3.46 MIU/L (ref 0.4–4.5)
WBC # BLD AUTO: 7.8 THOUSAND/UL (ref 3.8–10.8)
WBC #/AREA URNS HPF: ABNORMAL /HPF

## 2020-07-18 LAB
INR PPP: 1.3
PROTHROMBIN TIME: 13.3 SEC (ref 9–11.5)

## 2020-07-24 LAB
INR PPP: 1.4
PROTHROMBIN TIME: 14 SEC (ref 9–11.5)

## 2020-07-31 LAB
INR PPP: 1.6
PROTHROMBIN TIME: 15.5 SEC (ref 9–11.5)

## 2020-08-03 DIAGNOSIS — N18.30 CKD (CHRONIC KIDNEY DISEASE) STAGE 3, GFR 30-59 ML/MIN (HCC): ICD-10-CM

## 2020-08-03 DIAGNOSIS — J44.9 CHRONIC OBSTRUCTIVE PULMONARY DISEASE, UNSPECIFIED COPD TYPE (HCC): Primary | ICD-10-CM

## 2020-08-03 DIAGNOSIS — K21.9 GASTROESOPHAGEAL REFLUX DISEASE WITHOUT ESOPHAGITIS: ICD-10-CM

## 2020-08-03 RX ORDER — ALBUTEROL SULFATE 2.5 MG/3ML
2.5 SOLUTION RESPIRATORY (INHALATION) EVERY 6 HOURS PRN
Qty: 18 VIAL | Refills: 5 | Status: SHIPPED | OUTPATIENT
Start: 2020-08-03 | End: 2021-01-26 | Stop reason: SDUPTHER

## 2020-08-03 RX ORDER — IPRATROPIUM BROMIDE AND ALBUTEROL SULFATE .5; 3 MG/3ML; MG/3ML
3 SOLUTION RESPIRATORY (INHALATION) 4 TIMES DAILY
COMMUNITY
End: 2020-08-03 | Stop reason: CLARIF

## 2020-08-03 RX ORDER — IRON POLYSACCHARIDE COMPLEX 150 MG
150 CAPSULE ORAL 2 TIMES DAILY
Qty: 180 CAPSULE | Refills: 3 | Status: SHIPPED | OUTPATIENT
Start: 2020-08-03 | End: 2020-10-02 | Stop reason: HOSPADM

## 2020-08-06 DIAGNOSIS — J44.9 CHRONIC OBSTRUCTIVE PULMONARY DISEASE, UNSPECIFIED COPD TYPE (HCC): Primary | ICD-10-CM

## 2020-08-11 ENCOUNTER — TELEPHONE (OUTPATIENT)
Dept: FAMILY MEDICINE CLINIC | Facility: CLINIC | Age: 60
End: 2020-08-11

## 2020-08-11 NOTE — TELEPHONE ENCOUNTER
Patient called she wants to know if the authorization was done for the ipratropiubm bromide 0 5 mg   And albuteral sul 3 mg     She has been waiting for these to be approved    Her phone number is  [phone]

## 2020-08-12 NOTE — TELEPHONE ENCOUNTER
Called pharmacy, they faxed over a form, filled out and faxed back,  to be billed to medicare, waiting for response

## 2020-08-13 ENCOUNTER — TELEPHONE (OUTPATIENT)
Dept: FAMILY MEDICINE CLINIC | Facility: CLINIC | Age: 60
End: 2020-08-13

## 2020-08-17 ENCOUNTER — TELEPHONE (OUTPATIENT)
Dept: FAMILY MEDICINE CLINIC | Facility: CLINIC | Age: 60
End: 2020-08-17

## 2020-08-17 DIAGNOSIS — J44.9 CHRONIC OBSTRUCTIVE PULMONARY DISEASE, UNSPECIFIED COPD TYPE (HCC): Primary | ICD-10-CM

## 2020-08-17 DIAGNOSIS — I48.20 CHRONIC A-FIB (HCC): Primary | ICD-10-CM

## 2020-08-21 DIAGNOSIS — J44.9 CHRONIC OBSTRUCTIVE PULMONARY DISEASE, UNSPECIFIED COPD TYPE (HCC): ICD-10-CM

## 2020-08-21 LAB
INR PPP: 2.2
PROTHROMBIN TIME: 21.1 SEC (ref 9–11.5)

## 2020-08-26 ENCOUNTER — TELEPHONE (OUTPATIENT)
Dept: FAMILY MEDICINE CLINIC | Facility: CLINIC | Age: 60
End: 2020-08-26

## 2020-08-26 NOTE — TELEPHONE ENCOUNTER
I called Cira Boyd and left message on machine informing her INR was 2 2  Per Dr Zamzam Edgar she should continue the same doseage repeat in inr in 2 weeks  I also informed her the physician certification form was faxed  I then called visiting nurse Montell Gosselin of updated information

## 2020-09-09 ENCOUNTER — APPOINTMENT (EMERGENCY)
Dept: CT IMAGING | Facility: HOSPITAL | Age: 60
End: 2020-09-09
Payer: MEDICARE

## 2020-09-09 ENCOUNTER — HOSPITAL ENCOUNTER (INPATIENT)
Facility: HOSPITAL | Age: 60
LOS: 2 days | Discharge: HOME | DRG: 394 | End: 2020-09-11
Attending: SURGERY | Admitting: EMERGENCY MEDICINE
Payer: MEDICARE

## 2020-09-09 ENCOUNTER — APPOINTMENT (EMERGENCY)
Dept: RADIOLOGY | Facility: HOSPITAL | Age: 60
End: 2020-09-09
Payer: MEDICARE

## 2020-09-09 ENCOUNTER — HOSPITAL ENCOUNTER (EMERGENCY)
Facility: HOSPITAL | Age: 60
LOS: 1 days | End: 2020-09-09
Attending: EMERGENCY MEDICINE | Admitting: INTERNAL MEDICINE
Payer: MEDICARE

## 2020-09-09 VITALS
OXYGEN SATURATION: 97 % | RESPIRATION RATE: 20 BRPM | TEMPERATURE: 100.5 F | DIASTOLIC BLOOD PRESSURE: 46 MMHG | SYSTOLIC BLOOD PRESSURE: 116 MMHG | HEART RATE: 109 BPM

## 2020-09-09 DIAGNOSIS — N18.30 CKD (CHRONIC KIDNEY DISEASE) STAGE 3, GFR 30-59 ML/MIN (HCC): ICD-10-CM

## 2020-09-09 DIAGNOSIS — K56.609 SBO (SMALL BOWEL OBSTRUCTION) (HCC): ICD-10-CM

## 2020-09-09 DIAGNOSIS — K21.9 GASTROESOPHAGEAL REFLUX DISEASE WITHOUT ESOPHAGITIS: ICD-10-CM

## 2020-09-09 DIAGNOSIS — N39.0 UTI (URINARY TRACT INFECTION): ICD-10-CM

## 2020-09-09 DIAGNOSIS — I48.91 ATRIAL FIBRILLATION WITH RAPID VENTRICULAR RESPONSE (HCC): ICD-10-CM

## 2020-09-09 DIAGNOSIS — J44.9 CHRONIC OBSTRUCTIVE PULMONARY DISEASE, UNSPECIFIED COPD TYPE (HCC): ICD-10-CM

## 2020-09-09 DIAGNOSIS — E03.9 ACQUIRED HYPOTHYROIDISM: ICD-10-CM

## 2020-09-09 DIAGNOSIS — M79.3 PANNICULITIS: ICD-10-CM

## 2020-09-09 DIAGNOSIS — K56.609 SMALL BOWEL OBSTRUCTION (HCC): ICD-10-CM

## 2020-09-09 DIAGNOSIS — R09.02 HYPOXIA: ICD-10-CM

## 2020-09-09 DIAGNOSIS — N18.30 CHRONIC KIDNEY DISEASE, STAGE III (MODERATE) (HCC): ICD-10-CM

## 2020-09-09 DIAGNOSIS — L03.311 CELLULITIS OF ABDOMINAL WALL: ICD-10-CM

## 2020-09-09 DIAGNOSIS — A41.9 SEPSIS (HCC): ICD-10-CM

## 2020-09-09 DIAGNOSIS — R06.02 SOB (SHORTNESS OF BREATH): ICD-10-CM

## 2020-09-09 DIAGNOSIS — J18.9 PNEUMONIA: Primary | ICD-10-CM

## 2020-09-09 DIAGNOSIS — I48.91 ATRIAL FIBRILLATION, UNSPECIFIED TYPE (HCC): ICD-10-CM

## 2020-09-09 LAB
ALBUMIN SERPL BCP-MCNC: 3.5 G/DL (ref 3.4–4.8)
ALP SERPL-CCNC: 148.9 U/L (ref 35–140)
ALT SERPL W P-5'-P-CCNC: 5 U/L (ref 5–54)
ANION GAP SERPL CALCULATED.3IONS-SCNC: 3 MMOL/L (ref 4–13)
ANION GAP SERPL CALCULATED.3IONS-SCNC: 9 MMOL/L (ref 4–13)
APTT PPP: 35 SECONDS (ref 23–31)
AST SERPL W P-5'-P-CCNC: 11 U/L (ref 15–41)
BACTERIA UR QL AUTO: ABNORMAL /HPF
BASE EXCESS BLDA CALC-SCNC: 5 MMOL/L (ref -2–3)
BASO STIPL BLD QL SMEAR: PRESENT
BASOPHILS # BLD MANUAL: 0 THOUSAND/UL (ref 0–0.1)
BASOPHILS NFR MAR MANUAL: 0 % (ref 0–1)
BILIRUB SERPL-MCNC: 0.43 MG/DL (ref 0.3–1.2)
BILIRUB UR QL STRIP: NEGATIVE
BUN SERPL-MCNC: 24 MG/DL (ref 5–25)
BUN SERPL-MCNC: 25 MG/DL (ref 6–20)
CA-I BLD-SCNC: 1.21 MMOL/L (ref 1.12–1.32)
CALCIUM SERPL-MCNC: 8.4 MG/DL (ref 8.3–10.1)
CALCIUM SERPL-MCNC: 9.4 MG/DL (ref 8.4–10.2)
CHLORIDE SERPL-SCNC: 109 MMOL/L (ref 100–108)
CHLORIDE SERPL-SCNC: 99 MMOL/L (ref 96–108)
CK SERPL-CCNC: 37.8 U/L (ref 38–234)
CLARITY UR: ABNORMAL
CO2 SERPL-SCNC: 29 MMOL/L (ref 21–32)
CO2 SERPL-SCNC: 31 MMOL/L (ref 22–33)
COLOR UR: YELLOW
CREAT SERPL-MCNC: 1.81 MG/DL (ref 0.6–1.3)
CREAT SERPL-MCNC: 1.9 MG/DL (ref 0.4–1.1)
EOSINOPHIL # BLD MANUAL: 0.15 THOUSAND/UL (ref 0–0.4)
EOSINOPHIL NFR BLD MANUAL: 1 % (ref 0–6)
ERYTHROCYTE [DISTWIDTH] IN BLOOD BY AUTOMATED COUNT: 15.5 % (ref 11.6–15.1)
ERYTHROCYTE [DISTWIDTH] IN BLOOD BY AUTOMATED COUNT: 15.7 % (ref 11.6–15.1)
GFR SERPL CREATININE-BSD FRML MDRD: 28 ML/MIN/1.73SQ M
GFR SERPL CREATININE-BSD FRML MDRD: 30 ML/MIN/1.73SQ M
GLUCOSE SERPL-MCNC: 111 MG/DL (ref 65–140)
GLUCOSE SERPL-MCNC: 157 MG/DL (ref 65–140)
GLUCOSE SERPL-MCNC: 172 MG/DL (ref 65–140)
GLUCOSE UR STRIP-MCNC: NEGATIVE MG/DL
HCO3 BLDA-SCNC: 27.4 MMOL/L (ref 24–30)
HCT VFR BLD AUTO: 35.1 % (ref 34.8–46.1)
HCT VFR BLD AUTO: 38.2 % (ref 34.8–46.1)
HCT VFR BLD CALC: 35 % (ref 34.8–46.1)
HGB BLD-MCNC: 10.5 G/DL (ref 11.5–15.4)
HGB BLD-MCNC: 11.7 G/DL (ref 11.5–15.4)
HGB BLDA-MCNC: 11.9 G/DL (ref 11.5–15.4)
HGB UR QL STRIP.AUTO: ABNORMAL
INR PPP: 2.03 (ref 0.9–1.1)
KETONES UR STRIP-MCNC: NEGATIVE MG/DL
LACTATE SERPL-SCNC: 1.5 MMOL/L (ref 0–2)
LACTATE SERPL-SCNC: 2.8 MMOL/L (ref 0–2)
LEUKOCYTE ESTERASE UR QL STRIP: ABNORMAL
LIPASE SERPL-CCNC: 15 U/L (ref 13–60)
LYMPHOCYTES # BLD AUTO: 0.44 THOUSAND/UL (ref 0.6–4.47)
LYMPHOCYTES # BLD AUTO: 3 % (ref 14–44)
MAGNESIUM SERPL-MCNC: 1.5 MG/DL (ref 1.6–2.6)
MCH RBC QN AUTO: 30.4 PG (ref 26.8–34.3)
MCH RBC QN AUTO: 30.5 PG (ref 26.8–34.3)
MCHC RBC AUTO-ENTMCNC: 29.9 G/DL (ref 31.4–37.4)
MCHC RBC AUTO-ENTMCNC: 30.6 G/DL (ref 31.4–37.4)
MCV RBC AUTO: 102 FL (ref 82–98)
MCV RBC AUTO: 99 FL (ref 82–98)
METAMYELOCYTES NFR BLD MANUAL: 1 % (ref 0–1)
MONOCYTES # BLD AUTO: 0.15 THOUSAND/UL (ref 0–1.22)
MONOCYTES NFR BLD: 1 % (ref 4–12)
NEUTROPHILS # BLD MANUAL: 13.81 THOUSAND/UL (ref 1.85–7.62)
NEUTS BAND NFR BLD MANUAL: 11 % (ref 0–8)
NEUTS SEG NFR BLD AUTO: 83 % (ref 43–75)
NITRITE UR QL STRIP: NEGATIVE
NON-SQ EPI CELLS URNS QL MICRO: ABNORMAL /HPF
NRBC BLD AUTO-RTO: 0 /100 WBCS
PCO2 BLD: 28 MMOL/L (ref 21–32)
PCO2 BLD: 31.9 MM HG (ref 42–50)
PH BLD: 7.54 [PH] (ref 7.3–7.4)
PH UR STRIP.AUTO: 6 [PH]
PLATELET # BLD AUTO: 284 THOUSANDS/UL (ref 149–390)
PLATELET # BLD AUTO: 330 THOUSANDS/UL (ref 149–390)
PLATELET BLD QL SMEAR: ADEQUATE
PMV BLD AUTO: 9.8 FL (ref 8.9–12.7)
PMV BLD AUTO: 9.8 FL (ref 8.9–12.7)
PO2 BLD: 64 MM HG (ref 35–45)
POLYCHROMASIA BLD QL SMEAR: PRESENT
POTASSIUM BLD-SCNC: 4.1 MMOL/L (ref 3.5–5.3)
POTASSIUM SERPL-SCNC: 4 MMOL/L (ref 3.5–5.3)
POTASSIUM SERPL-SCNC: 4.4 MMOL/L (ref 3.5–5)
PROCALCITONIN SERPL-MCNC: 0.06 NG/ML
PROT SERPL-MCNC: 7.6 G/DL (ref 6.4–8.3)
PROT UR STRIP-MCNC: ABNORMAL MG/DL
PROTHROMBIN TIME: 20.5 SECONDS (ref 9.5–12.1)
RBC # BLD AUTO: 3.44 MILLION/UL (ref 3.81–5.12)
RBC # BLD AUTO: 3.85 MILLION/UL (ref 3.81–5.12)
RBC #/AREA URNS AUTO: ABNORMAL /HPF
RBC MORPH BLD: PRESENT
SAO2 % BLD FROM PO2: 95 % (ref 60–85)
SARS-COV-2 RNA RESP QL NAA+PROBE: NEGATIVE
SODIUM BLD-SCNC: 138 MMOL/L (ref 136–145)
SODIUM SERPL-SCNC: 139 MMOL/L (ref 133–145)
SODIUM SERPL-SCNC: 141 MMOL/L (ref 136–145)
SP GR UR STRIP.AUTO: 1.01 (ref 1–1.03)
SPECIMEN SOURCE: ABNORMAL
TOTAL CELLS COUNTED SPEC: 100
TROPONIN I SERPL-MCNC: <0.03 NG/ML (ref 0–0.07)
UROBILINOGEN UR QL STRIP.AUTO: 0.2 E.U./DL
WBC # BLD AUTO: 14.69 THOUSAND/UL (ref 4.31–10.16)
WBC # BLD AUTO: 25.32 THOUSAND/UL (ref 4.31–10.16)
WBC #/AREA URNS AUTO: ABNORMAL /HPF

## 2020-09-09 PROCEDURE — 87040 BLOOD CULTURE FOR BACTERIA: CPT | Performed by: EMERGENCY MEDICINE

## 2020-09-09 PROCEDURE — 94760 N-INVAS EAR/PLS OXIMETRY 1: CPT

## 2020-09-09 PROCEDURE — 85027 COMPLETE CBC AUTOMATED: CPT | Performed by: STUDENT IN AN ORGANIZED HEALTH CARE EDUCATION/TRAINING PROGRAM

## 2020-09-09 PROCEDURE — 99285 EMERGENCY DEPT VISIT HI MDM: CPT

## 2020-09-09 PROCEDURE — 94640 AIRWAY INHALATION TREATMENT: CPT

## 2020-09-09 PROCEDURE — 80053 COMPREHEN METABOLIC PANEL: CPT | Performed by: EMERGENCY MEDICINE

## 2020-09-09 PROCEDURE — 82947 ASSAY GLUCOSE BLOOD QUANT: CPT

## 2020-09-09 PROCEDURE — 80048 BASIC METABOLIC PNL TOTAL CA: CPT | Performed by: STUDENT IN AN ORGANIZED HEALTH CARE EDUCATION/TRAINING PROGRAM

## 2020-09-09 PROCEDURE — 87635 SARS-COV-2 COVID-19 AMP PRB: CPT | Performed by: EMERGENCY MEDICINE

## 2020-09-09 PROCEDURE — 87185 SC STD ENZYME DETCJ PER NZM: CPT | Performed by: EMERGENCY MEDICINE

## 2020-09-09 PROCEDURE — 81001 URINALYSIS AUTO W/SCOPE: CPT | Performed by: EMERGENCY MEDICINE

## 2020-09-09 PROCEDURE — 99222 1ST HOSP IP/OBS MODERATE 55: CPT | Performed by: EMERGENCY MEDICINE

## 2020-09-09 PROCEDURE — G1004 CDSM NDSC: HCPCS

## 2020-09-09 PROCEDURE — 96365 THER/PROPH/DIAG IV INF INIT: CPT

## 2020-09-09 PROCEDURE — 85014 HEMATOCRIT: CPT

## 2020-09-09 PROCEDURE — 82330 ASSAY OF CALCIUM: CPT

## 2020-09-09 PROCEDURE — 82803 BLOOD GASES ANY COMBINATION: CPT

## 2020-09-09 PROCEDURE — 84132 ASSAY OF SERUM POTASSIUM: CPT

## 2020-09-09 PROCEDURE — 71045 X-RAY EXAM CHEST 1 VIEW: CPT

## 2020-09-09 PROCEDURE — 84145 PROCALCITONIN (PCT): CPT | Performed by: EMERGENCY MEDICINE

## 2020-09-09 PROCEDURE — 71250 CT THORAX DX C-: CPT

## 2020-09-09 PROCEDURE — 85730 THROMBOPLASTIN TIME PARTIAL: CPT | Performed by: EMERGENCY MEDICINE

## 2020-09-09 PROCEDURE — 74176 CT ABD & PELVIS W/O CONTRAST: CPT

## 2020-09-09 PROCEDURE — 87081 CULTURE SCREEN ONLY: CPT | Performed by: PHYSICIAN ASSISTANT

## 2020-09-09 PROCEDURE — 85007 BL SMEAR W/DIFF WBC COUNT: CPT | Performed by: EMERGENCY MEDICINE

## 2020-09-09 PROCEDURE — 84295 ASSAY OF SERUM SODIUM: CPT

## 2020-09-09 PROCEDURE — 87076 CULTURE ANAEROBE IDENT EACH: CPT | Performed by: EMERGENCY MEDICINE

## 2020-09-09 PROCEDURE — 82550 ASSAY OF CK (CPK): CPT | Performed by: EMERGENCY MEDICINE

## 2020-09-09 PROCEDURE — 85027 COMPLETE CBC AUTOMATED: CPT | Performed by: EMERGENCY MEDICINE

## 2020-09-09 PROCEDURE — 85610 PROTHROMBIN TIME: CPT | Performed by: EMERGENCY MEDICINE

## 2020-09-09 PROCEDURE — 99291 CRITICAL CARE FIRST HOUR: CPT | Performed by: EMERGENCY MEDICINE

## 2020-09-09 PROCEDURE — 93005 ELECTROCARDIOGRAM TRACING: CPT

## 2020-09-09 PROCEDURE — 36415 COLL VENOUS BLD VENIPUNCTURE: CPT | Performed by: EMERGENCY MEDICINE

## 2020-09-09 PROCEDURE — 83690 ASSAY OF LIPASE: CPT | Performed by: EMERGENCY MEDICINE

## 2020-09-09 PROCEDURE — 83735 ASSAY OF MAGNESIUM: CPT | Performed by: EMERGENCY MEDICINE

## 2020-09-09 PROCEDURE — 84484 ASSAY OF TROPONIN QUANT: CPT | Performed by: EMERGENCY MEDICINE

## 2020-09-09 PROCEDURE — 83605 ASSAY OF LACTIC ACID: CPT | Performed by: EMERGENCY MEDICINE

## 2020-09-09 RX ORDER — CHLORHEXIDINE GLUCONATE 0.12 MG/ML
15 RINSE ORAL EVERY 12 HOURS SCHEDULED
Status: DISCONTINUED | OUTPATIENT
Start: 2020-09-09 | End: 2020-09-12 | Stop reason: HOSPADM

## 2020-09-09 RX ORDER — MAGNESIUM SULFATE HEPTAHYDRATE 40 MG/ML
2 INJECTION, SOLUTION INTRAVENOUS ONCE
Status: COMPLETED | OUTPATIENT
Start: 2020-09-09 | End: 2020-09-09

## 2020-09-09 RX ORDER — METOPROLOL TARTRATE 5 MG/5ML
5 INJECTION INTRAVENOUS EVERY 6 HOURS
Status: DISCONTINUED | OUTPATIENT
Start: 2020-09-09 | End: 2020-09-12 | Stop reason: HOSPADM

## 2020-09-09 RX ORDER — LEVOTHYROXINE SODIUM 88 UG/1
88 TABLET ORAL
Status: DISCONTINUED | OUTPATIENT
Start: 2020-09-09 | End: 2020-09-12 | Stop reason: HOSPADM

## 2020-09-09 RX ORDER — ALBUTEROL SULFATE 2.5 MG/3ML
2.5 SOLUTION RESPIRATORY (INHALATION) EVERY 4 HOURS PRN
Status: DISCONTINUED | OUTPATIENT
Start: 2020-09-09 | End: 2020-09-11

## 2020-09-09 RX ORDER — FLUTICASONE FUROATE AND VILANTEROL 100; 25 UG/1; UG/1
1 POWDER RESPIRATORY (INHALATION)
Status: DISCONTINUED | OUTPATIENT
Start: 2020-09-09 | End: 2020-09-12 | Stop reason: HOSPADM

## 2020-09-09 RX ORDER — METOPROLOL SUCCINATE 25 MG/1
25 TABLET, EXTENDED RELEASE ORAL DAILY
Status: DISCONTINUED | OUTPATIENT
Start: 2020-09-09 | End: 2020-09-09

## 2020-09-09 RX ORDER — PANTOPRAZOLE SODIUM 40 MG/1
40 TABLET, DELAYED RELEASE ORAL
Status: DISCONTINUED | OUTPATIENT
Start: 2020-09-09 | End: 2020-09-12 | Stop reason: HOSPADM

## 2020-09-09 RX ORDER — HEPARIN SODIUM 5000 [USP'U]/ML
7500 INJECTION, SOLUTION INTRAVENOUS; SUBCUTANEOUS EVERY 8 HOURS SCHEDULED
Status: DISCONTINUED | OUTPATIENT
Start: 2020-09-09 | End: 2020-09-12 | Stop reason: HOSPADM

## 2020-09-09 RX ORDER — SODIUM CHLORIDE, SODIUM GLUCONATE, SODIUM ACETATE, POTASSIUM CHLORIDE, MAGNESIUM CHLORIDE, SODIUM PHOSPHATE, DIBASIC, AND POTASSIUM PHOSPHATE .53; .5; .37; .037; .03; .012; .00082 G/100ML; G/100ML; G/100ML; G/100ML; G/100ML; G/100ML; G/100ML
50 INJECTION, SOLUTION INTRAVENOUS CONTINUOUS
Status: DISCONTINUED | OUTPATIENT
Start: 2020-09-09 | End: 2020-09-11

## 2020-09-09 RX ORDER — ALBUTEROL SULFATE 90 UG/1
2 AEROSOL, METERED RESPIRATORY (INHALATION) EVERY 4 HOURS PRN
Status: DISCONTINUED | OUTPATIENT
Start: 2020-09-09 | End: 2020-09-12 | Stop reason: HOSPADM

## 2020-09-09 RX ORDER — CEFEPIME HYDROCHLORIDE 2 G/50ML
2000 INJECTION, SOLUTION INTRAVENOUS ONCE
Status: COMPLETED | OUTPATIENT
Start: 2020-09-09 | End: 2020-09-09

## 2020-09-09 RX ORDER — SODIUM CHLORIDE 9 MG/ML
125 INJECTION, SOLUTION INTRAVENOUS CONTINUOUS
Status: DISCONTINUED | OUTPATIENT
Start: 2020-09-09 | End: 2020-09-09 | Stop reason: HOSPADM

## 2020-09-09 RX ORDER — HEPARIN SODIUM 5000 [USP'U]/ML
5000 INJECTION, SOLUTION INTRAVENOUS; SUBCUTANEOUS EVERY 8 HOURS SCHEDULED
Status: DISCONTINUED | OUTPATIENT
Start: 2020-09-09 | End: 2020-09-09 | Stop reason: SDUPTHER

## 2020-09-09 RX ORDER — ATORVASTATIN CALCIUM 10 MG/1
10 TABLET, FILM COATED ORAL DAILY
Status: DISCONTINUED | OUTPATIENT
Start: 2020-09-09 | End: 2020-09-12 | Stop reason: HOSPADM

## 2020-09-09 RX ORDER — MICONAZOLE NITRATE 20 MG/G
CREAM TOPICAL 2 TIMES DAILY
Status: DISCONTINUED | OUTPATIENT
Start: 2020-09-09 | End: 2020-09-12 | Stop reason: HOSPADM

## 2020-09-09 RX ORDER — ACETAMINOPHEN 325 MG/1
975 TABLET ORAL EVERY 6 HOURS PRN
Status: DISCONTINUED | OUTPATIENT
Start: 2020-09-09 | End: 2020-09-12 | Stop reason: HOSPADM

## 2020-09-09 RX ORDER — ALBUTEROL SULFATE 90 UG/1
2 AEROSOL, METERED RESPIRATORY (INHALATION) EVERY 4 HOURS
Status: DISCONTINUED | OUTPATIENT
Start: 2020-09-09 | End: 2020-09-09

## 2020-09-09 RX ORDER — ACETAMINOPHEN 325 MG/1
975 TABLET ORAL ONCE
Status: COMPLETED | OUTPATIENT
Start: 2020-09-09 | End: 2020-09-09

## 2020-09-09 RX ORDER — LEVALBUTEROL 1.25 MG/.5ML
1.25 SOLUTION, CONCENTRATE RESPIRATORY (INHALATION)
Status: DISCONTINUED | OUTPATIENT
Start: 2020-09-09 | End: 2020-09-12 | Stop reason: HOSPADM

## 2020-09-09 RX ORDER — DILTIAZEM HYDROCHLORIDE 5 MG/ML
10 INJECTION INTRAVENOUS ONCE
Status: COMPLETED | OUTPATIENT
Start: 2020-09-09 | End: 2020-09-09

## 2020-09-09 RX ADMIN — METOROPROLOL TARTRATE 5 MG: 5 INJECTION, SOLUTION INTRAVENOUS at 18:35

## 2020-09-09 RX ADMIN — LEVALBUTEROL HYDROCHLORIDE 1.25 MG: 1.25 SOLUTION, CONCENTRATE RESPIRATORY (INHALATION) at 19:42

## 2020-09-09 RX ADMIN — ACETAMINOPHEN 975 MG: 325 TABLET, FILM COATED ORAL at 21:42

## 2020-09-09 RX ADMIN — LEVALBUTEROL HYDROCHLORIDE 1.25 MG: 1.25 SOLUTION, CONCENTRATE RESPIRATORY (INHALATION) at 14:10

## 2020-09-09 RX ADMIN — ATORVASTATIN CALCIUM 10 MG: 10 TABLET, FILM COATED ORAL at 13:25

## 2020-09-09 RX ADMIN — IPRATROPIUM BROMIDE 0.5 MG: 0.5 SOLUTION RESPIRATORY (INHALATION) at 14:11

## 2020-09-09 RX ADMIN — CEFTRIAXONE 1000 MG: 1 INJECTION, POWDER, FOR SOLUTION INTRAMUSCULAR; INTRAVENOUS at 18:04

## 2020-09-09 RX ADMIN — CHLORHEXIDINE GLUCONATE 0.12% ORAL RINSE 15 ML: 1.2 LIQUID ORAL at 13:25

## 2020-09-09 RX ADMIN — VANCOMYCIN HYDROCHLORIDE 1250 MG: 1 INJECTION, POWDER, LYOPHILIZED, FOR SOLUTION INTRAVENOUS at 07:01

## 2020-09-09 RX ADMIN — SODIUM CHLORIDE 500 ML: 0.9 INJECTION, SOLUTION INTRAVENOUS at 07:40

## 2020-09-09 RX ADMIN — ACETAMINOPHEN 975 MG: 325 TABLET, FILM COATED ORAL at 05:47

## 2020-09-09 RX ADMIN — CHLORHEXIDINE GLUCONATE 0.12% ORAL RINSE 15 ML: 1.2 LIQUID ORAL at 21:28

## 2020-09-09 RX ADMIN — DILTIAZEM HYDROCHLORIDE 10 MG: 5 INJECTION INTRAVENOUS at 06:50

## 2020-09-09 RX ADMIN — HEPARIN SODIUM 7500 UNITS: 5000 INJECTION INTRAVENOUS; SUBCUTANEOUS at 21:28

## 2020-09-09 RX ADMIN — SODIUM CHLORIDE 1000 ML: 0.9 INJECTION, SOLUTION INTRAVENOUS at 05:29

## 2020-09-09 RX ADMIN — CEFEPIME HYDROCHLORIDE 2000 MG: 2 INJECTION, SOLUTION INTRAVENOUS at 05:47

## 2020-09-09 RX ADMIN — IPRATROPIUM BROMIDE 0.5 MG: 0.5 SOLUTION RESPIRATORY (INHALATION) at 19:42

## 2020-09-09 RX ADMIN — SODIUM CHLORIDE 125 ML/HR: 0.9 INJECTION, SOLUTION INTRAVENOUS at 08:11

## 2020-09-09 RX ADMIN — MAGNESIUM SULFATE IN WATER 2 G: 40 INJECTION, SOLUTION INTRAVENOUS at 07:00

## 2020-09-09 RX ADMIN — SODIUM CHLORIDE 1000 ML: 0.9 INJECTION, SOLUTION INTRAVENOUS at 07:01

## 2020-09-09 RX ADMIN — PANTOPRAZOLE SODIUM 40 MG: 40 TABLET, DELAYED RELEASE ORAL at 13:25

## 2020-09-09 RX ADMIN — SODIUM CHLORIDE, SODIUM GLUCONATE, SODIUM ACETATE, POTASSIUM CHLORIDE AND MAGNESIUM CHLORIDE 75 ML/HR: 526; 502; 368; 37; 30 INJECTION, SOLUTION INTRAVENOUS at 18:17

## 2020-09-09 RX ADMIN — HEPARIN SODIUM 7500 UNITS: 5000 INJECTION INTRAVENOUS; SUBCUTANEOUS at 15:20

## 2020-09-09 RX ADMIN — LEVOTHYROXINE SODIUM 88 MCG: 88 TABLET ORAL at 13:24

## 2020-09-09 NOTE — ED NOTES
Dr Thee Castro at bedside, plan to transfer patient based on CT findings     Hadley Parsons RN  09/09/20 1179

## 2020-09-09 NOTE — H&P
H&P Exam - General Surgery   Lisa High 63 y o  female MRN: [id number]  Unit/Bed#: [facility] 06 Encounter: [id number]    Assessment/Plan     Assessment:  69-year-old female with multiple medical comorbidities and multiple previous abdominal surgeries with known ventral hernia, presents with CT imaging findings concerning for small-bowel obstruction without clinical signs or symptoms of obstruction  Also concern for pneumonia in setting of leukocytosis, increasing oxygen requirements and infiltrate seen on imaging    Afebrile  Mild tachycardia  Mild hypotension    WBC 14  Lactic 1 5 from 2 8 after 2 L boluses   UA negative for infection    Plan:  Patient is having normal bowel function and tolerating a diet without nausea or vomiting  Low suspicion for complete obstruction at this time  Would like her in the ICU until her blood pressure stabilizes  NPO, place NGT if vomits or has increasing abdominal distention   [email or web address]  Hold cardizem gtt in setting of hypotension  Hold coumadin   Continue Abx - Rocephin   DVT ppx  Remainder of care per ICU        History of Present Illness   HPI:  Lisa High is a 63 y o  female PMHx of CKD, HTN, HLD, DM, morbid obesity, ovarian cancer s/p GEMMA BSO, kidney cancer s/p R nephrectomy, hypothyroidism who initially presented to [place] emergency department complaining of panic attack  Patient was evaluated in the emergency department where she was found to be in AFib with RVR and has signs of severe sepsis  Blood cultures were obtained and she was started on a Cardizem drip after 2 boluses and received cefepime and Vanco for broad-spectrum antibiotic coverage  A CT scan of her chest abdomen pelvis was performed which showed a high-grade small-bowel obstruction secondary to large right ventral wall hernia in addition to a left lower lobe infiltrate concerning for atelectasis versus pneumonia    Patient was transferred to [place] for acute care surgery and surgical Critical Care management  At time of evaluation, patient was only complaining of mild shortness of breath  She denied any obstructive symptoms including abdominal pain, obstipation, constipation, nausea or vomiting with meals  Patient states she initially presented to [facility] after having a panic attack and was feeling increasingly short of breath  She administered 2 breathing treatments at home and subsequently presented to the emergency department for evaluation  Additional past surgical history includes a cholecystectomy and previous surgery for an ectopic pregnancy several years ago  Review of Systems   Constitutional: Negative for chills, fatigue and fever  HENT: Negative  Eyes: Negative  Respiratory: Positive for shortness of breath  Negative for cough and chest tightness  Cardiovascular: Negative for chest pain and palpitations  Gastrointestinal: Negative for abdominal pain, blood in stool, constipation, diarrhea, nausea and vomiting         + dry heaving    Endocrine: Negative  Genitourinary: Negative  Negative for difficulty urinating  Musculoskeletal: Negative  Negative for arthralgias  Skin: Negative  Negative for rash  Allergic/Immunologic: Negative  Neurological: Negative  Negative for dizziness and light-headedness  Hematological: Negative  Psychiatric/Behavioral: The patient is nervous/anxious           + panic attack        Historical Information   Past Medical History:   Diagnosis Date    Anemia     Arthritis     [facility] kidney [facility] [address] )     Chronic kidney disease     Diabetes mellitus ([facility] [facility] )     Disease of thyroid gland     HLD (hyperlipidemia)     Hypertension     Ovarian cancer [facility])      Past Surgical History:   Procedure Laterality Date    GALLBLADDER SURGERY  05/01/2004    HYSTERECTOMY  06/01/2018    NEPHRECTOMY Right 08/02/2018     Social History   Social History     Substance and Sexual Activity   Alcohol Use No     Social History Substance and Sexual Activity   Drug Use No     Social History     Tobacco Use   Smoking Status Former Smoker    Packs/day: 2 00    Years: 30 00    Pack years: 60 00   Smokeless Tobacco Never Used     E-Cigarette/Vaping    E-Cigarette Use Never User      E-Cigarette/Vaping Substances     Family History: non-contributory    Meds/Allergies   PTA meds:   Prior to Admission Medications   Prescriptions Last Dose Informant Patient Reported? Taking? Cholecalciferol (RA Vitamin D-3) 50 MCG (2000 UT) CAPS   No No   Sig: Take 1 capsule (2,000 Units total) by mouth daily   Patient taking differently: Take 400 Units by mouth daily    IPRATROPIUM-ALBUTEROL IN   Yes No   Sig: Inhale 2 5 mg 4 (four) times a day   Ventolin  (90 Base) MCG/ACT inhaler   No No   Sig: Inhale 2 puffs every 4 (four) hours   albuterol (2 5 mg/3 mL) 0 083 % nebulizer solution   No No   Sig: Take 1 vial (2 5 mg total) by nebulization every 6 (six) hours as needed for wheezing or shortness of breath   atorvastatin (LIPITOR) 10 mg tablet   No No   Sig: Take 1 tablet (10 mg total) by mouth daily   fluticasone-salmeterol (ADVAIR, WIXELA) 250-50 mcg/dose inhaler   Yes No   Sig: Inhale 1 puff 2 (two) times a day Rinse mouth after use     folic acid (FOLVITE) 1 mg tablet   Yes No   Sig: Take 1,000 mcg by mouth daily   furosemide (LASIX) 40 mg tablet   Yes No   Sig: Take 40 mg by mouth daily   ipratropium (ATROVENT) 0 02 % nebulizer solution   No No   Sig: INHALE CONTENTS OF 1 VIAL IN NEBULIZER FOUR TIMES A DAY   iron polysaccharides (FERREX) 150 mg capsule   No No   Sig: Take 1 capsule (150 mg total) by mouth 2 (two) times a day   Patient taking differently: Take 150 mg by mouth daily    levothyroxine 88 mcg tablet   No No   Sig: Take 1 tablet (88 mcg total) by mouth daily   Patient taking differently: Take 100 mcg by mouth daily    metoprolol succinate (TOPROL-XL) 25 mg 24 hr tablet   No No   Sig: Take 1 tablet (25 mg total) by mouth daily pantoprazole (PROTONIX) 40 mg tablet   No No   Sig: Take 1 tablet (40 mg total) by mouth daily   vitamin B-12 (VITAMIN B-12) 500 mcg tablet   No No   Sig: Take 1 tablet (500 mcg total) by mouth daily   warfarin (COUMADIN) 5 mg tablet   No No   Sig: Take 1 tablet (5 mg total) by mouth daily   Patient taking differently: Take 3 mg by mouth daily       Facility-Administered Medications: None     No Known Allergies    Objective   First Vitals:   Blood Pressure: (!) 92/49 (09/09/20 1015)  Pulse: (!) 106 (09/09/20 1015)  Temperature: 99 9 °F (37 7 °C) (09/09/20 1015)  Temp Source: Oral (09/09/20 1015)  Respirations: 17 (09/09/20 1015)  SpO2: 96 % (09/09/20 1015)    Current Vitals:   Blood Pressure: (!) 92/49 (09/09/20 1015)  Pulse: (!) 106 (09/09/20 1015)  Temperature: 99 9 °F (37 7 °C) (09/09/20 1015)  Temp Source: Oral (09/09/20 1015)  Respirations: 17 (09/09/20 1015)  SpO2: 96 % (09/09/20 1015)    No intake or output data in the 24 hours ending 09/09/20 1044    Invasive Devices     Peripheral Intravenous Line            Peripheral IV 09/09/20 Left Antecubital less than 1 day    Peripheral IV 09/09/20 Right;Upper Arm less than 1 day                Physical Exam  Vitals signs and nursing note reviewed  Exam conducted with a chaperone present  Constitutional:       General: She is not in acute distress  Appearance: She is well-developed  She is obese  She is not ill-appearing, toxic-appearing or diaphoretic  HENT:      Head: Normocephalic and atraumatic  Eyes:      Conjunctiva/sclera: Conjunctivae normal       Pupils: Pupils are equal, round, and reactive to light  Neck:      Musculoskeletal: Normal range of motion  Cardiovascular:      Rate and Rhythm: Tachycardia present  Rhythm irregularly irregular  Heart sounds: Normal heart sounds  Comments: Mild tachycardia  Pulmonary:      Effort: Pulmonary effort is normal  No respiratory distress        Comments: On 2 LNC  Abdominal:      General: There is no distension  Palpations: Abdomen is soft  Tenderness: There is abdominal tenderness (mild RLQ tenderness to deep palpation )  There is no rebound  Comments: Obese abdomen  Large ventral hernia present just R of midline, reducible, mild tenderness  Musculoskeletal: Normal range of motion  General: No deformity  Skin:     General: Skin is warm and dry  Comments: Erythematous patchy lesions in skin folds consistent with candidiasis  Neurological:      Mental Status: She is alert and oriented to person, place, and time  Cranial Nerves: No cranial nerve deficit  Psychiatric:         Mood and Affect: Mood normal          Behavior: Behavior normal          Lab Results:   CBC:   Lab Results   Component Value Date    WBC 14 69 (H) 09/09/2020    HGB 11 9 09/09/2020    HCT 35 09/09/2020    MCV 99 (H) 09/09/2020     09/09/2020    MCH 30 4 09/09/2020    MCHC 30 6 (L) 09/09/2020    RDW 15 5 (H) 09/09/2020    MPV 9 8 09/09/2020   , CMP:   Lab Results   Component Value Date    SODIUM 139 09/09/2020    K 4 4 09/09/2020    CL 99 09/09/2020    CO2 28 09/09/2020    BUN 25 (H) 09/09/2020    CREATININE 1 90 (H) 09/09/2020    GLUCOSE 172 (H) 09/09/2020    CALCIUM 9 4 09/09/2020    AST 11 (L) 09/09/2020    ALT 5 09/09/2020    ALKPHOS 148 9 (H) 09/09/2020    EGFR 28 09/09/2020   , Coagulation:   Lab Results   Component Value Date    INR 2 03 (H) 09/09/2020     Imaging: I have personally reviewed pertinent reports  9/9 CTCAP:   IMPRESSION:  1  Limited examination due to respiratory motion artifact, patient motion artifact and lack of intravenous and oral contrast material   2   High-grade mid small bowel obstruction secondary to large right paramedian ventral abdominal wall hernia containing both small and large bowel  Recommend follow-up surgical consultation  3   Left lower lobe infiltrate representing atelectasis or pneumonia   In the setting of clinically suspected/proven COVID-19, the above lung parenchymal findings on CT indicate low confidence level for COVID-19   4   3 mm noncalcified pleural-based nodule left upper lobe  Based on current Fleischner Society 2017 Guidelines on incidental pulmonary nodule, no routine follow-up is needed if the patient is considered low risk for lung cancer  If the patient is   considered high risk for lung cancer, 12 month follow-up non-contrast chest CT is recommended  EKG, Pathology, and Other Studies: I have personally reviewed pertinent reports  Code Status: Level 1 - Full Code  Advance Directive and Living Will:      Power of :    POLST:      Counseling / Coordination of Care  Total floor / unit time spent today 30 minutes  Greater than 50% of total time was spent with the patient and / or family counseling and / or coordination of care    A description of the counseling / coordination of care: discussion with patient and surgical team

## 2020-09-09 NOTE — EMTALA/ACUTE CARE TRANSFER
[address]  [address] [place] [id number]  Dept: [phone]      EMTALA TRANSFER CONSENT    NAME Irving KEARNS 1960                              MRN [id number]    I have been informed of my rights regarding examination, treatment, and transfer   by Dr Sara Huggins MD    Benefits: Specialized equipment and/or services available at the receiving facility (Include comment)________________________(surgical critical care)    Risks: Potential for delay in receiving treatment, Potential deterioration of medical condition, Loss of IV, Increased discomfort during transfer, Possible worsening of condition or death during transfer      Consent for Transfer:  I acknowledge that my medical condition has been evaluated and explained to me by the emergency department physician or other qualified medical person and/or my attending physician, who has recommended that I be transferred to the service of  Accepting Physician: Dr Margaretha Dandy at [facility], [address] : [facility]  The above potential benefits of such transfer, the potential risks associated with such transfer, and the probable risks of not being transferred have been explained to me, and I fully understand them  The doctor has explained that, in my case, the benefits of transfer outweigh the risks  I agree to be transferred  I authorize the performance of emergency medical procedures and treatments upon me in both transit and upon arrival at the receiving facility  Additionally, I authorize the release of any and all medical records to the receiving facility and request they be transported with me, if possible  I understand that the safest mode of transportation during a medical emergency is an ambulance and that the Hospital advocates the use of this mode of transport   Risks of traveling to the receiving facility by car, including absence of medical control, life sustaining equipment, such as oxygen, and medical personnel has been explained to me and I fully understand them  (SWAPNIL CORRECT BOX BELOW)  [  ]  I consent to the stated transfer and to be transported by ambulance/helicopter  [  ]  I consent to the stated transfer, but refuse transportation by ambulance and accept full responsibility for my transportation by car  I understand the risks of non-ambulance transfers and I exonerate the Hospital and its staff from any deterioration in my condition that results from this refusal     X___________________________________________    DATE  20  TIME________  Signature of patient or legally responsible individual signing on patient behalf           RELATIONSHIP TO PATIENT_________________________          Provider Certification    NAME Nellie Blankenship                                         1960                              MRN [id number]    A medical screening exam was performed on the above named patient  Based on the examination:    Condition Necessitating Transfer The primary encounter diagnosis was Pneumonia  Diagnoses of Sepsis ([address] ), UTI (urinary tract infection), Hypoxia, Small bowel obstruction ([address] ), Panniculitis, and Atrial fibrillation with rapid ventricular response ([address] ) were also pertinent to this visit      Patient Condition: The patient has been stabilized such that within reasonable medical probability, no material deterioration of the patient condition or the condition of the unborn child(ana) is likely to result from the transfer    Reason for Transfer: Level of Care needed not available at this facility(surgical critical care)    Transfer Requirements: Facility [facility]   · Space available and qualified personnel available for treatment as acknowledged by [facility]  · Agreed to accept transfer and to provide appropriate medical treatment as acknowledged by       Dr Michelle Ramirez  · Appropriate medical records of the examination and treatment of the patient are provided at the time of transfer   [facility], [address] _______  · Transfer will be performed by qualified personnel from [facility]  and appropriate transfer equipment as required, including the use of necessary and appropriate life support measures  Provider Certification: I have examined the patient and explained the following risks and benefits of being transferred/refusing transfer to the patient/family:  General risk, such as traffic hazards, adverse weather conditions, rough terrain or turbulence, possible failure of equipment (including vehicle or aircraft), or consequences of actions of persons outside the control of the transport personnel, Unanticipated needs of medical equipment and personnel during transport, Risk of worsening condition, The possibility of a transport vehicle being unavailable      Based on these reasonable risks and benefits to the patient and/or the unborn child(ana), and based upon the information available at the time of the patients examination, I certify that the medical benefits reasonably to be expected from the provision of appropriate medical treatments at another medical facility outweigh the increasing risks, if any, to the individuals medical condition, and in the case of labor to the unborn child, from effecting the transfer      X____________________________________________ DATE 09/09/20        TIME_______      ORIGINAL - SEND TO MEDICAL RECORDS   COPY - SEND WITH PATIENT DURING TRANSFER

## 2020-09-09 NOTE — SEPSIS NOTE
Sepsis Note   Bea Metz 63 y o  female MRN: [id number]  Unit/Bed#: ED 04 Encounter: [id number]      qSOFA     Row Name 09/09/20 6782 09/09/20 0740 09/09/20 5875 09/09/20 0722 09/09/20 0716    Altered mental status GCS < 15          0    Respiratory Rate > / =22  0  0  1  1      Systolic BP < / =999  1  1    0      Q Sofa Score  1  1  1  1  0    Row Name 09/09/20 0707 09/09/20 0706 09/09/20 0645 09/09/20 0625 09/09/20 0620    Altered mental status GCS < 15              Respiratory Rate > / =22  0  0  0  1  0    Systolic BP < / =734  0  1  1  0  0    Q Sofa Score  0  1  1  1  0    Row Name 09/09/20 0552 09/09/20 0545 09/09/20 0530 09/09/20 0520 09/09/20 0510    Altered mental status GCS < 15  0            Respiratory Rate > / =22    1  1  1  1    Systolic BP < / =807    0  0  0  1    Q Sofa Score  1  1  1  1  2        Initial Sepsis Screening     Row Name 09/09/20 0753                Is the patient's history suggestive of a new or worsening infection? (!) Yes (Proceed)  -RP        Suspected source of infection  pneumonia  -RP        Are two or more of the following signs & symptoms of infection both present and new to the patient? (!) Yes (Proceed)  -RP        Indicate SIRS criteria  Hyperthemia > 38 3C (100 9F);WBC > 10% bands; Tachycardia > 90 bpm;Tachypnea > 20 resp per min;Leukocytosis (WBC > [address] IJL)  -RP        If the answer is yes to both questions, suspicion of sepsis is present          If severe sepsis is present AND tissue hypoperfusion perists in the hour after fluid resuscitation or lactate > 4, the patient meets criteria for SEPTIC SHOCK          Are any of the following organ dysfunction criteria present within 6 hours of suspected infection and SIRS criteria that are NOT considered to be chronic conditions?   (!) Yes  -RP        Organ dysfunction  Lactate > 2 0 mmol/L;Creatinine > 0 5 mg/dl ABOVE BASELINE  -RP        Date of presentation of severe sepsis  09/09/20  -RP Time of presentation of severe sepsis  0754  -RP        Tissue hypoperfusion persists in the hour after crystalloid fluid administration, evidenced, by either:          Was hypotension present within one hour of the conclusion of crystalloid fluid administration?   No  -RP        Date of presentation of septic shock          Time of presentation of septic shock            User Key  (r) = Recorded By, (t) = Taken By, (c) = Cosigned By    [address] Name Provider Type    RP Saroj Epperson MD Physician

## 2020-09-09 NOTE — ED PROVIDER NOTES
History  Chief Complaint   Patient presents with    Shortness of Breath     Pt reports being home when she started gagging then became SOB and felt nauseous     This is a 65-year-old female with a history of diabetes, atrial fibrillation, and COPD presenting to the ED via EMS for evaluation of chills, vomiting, and shortness of breath  He states it just started this morning prior to arrival  The patient started to cough and feel short of breath, and then severe chills and rigors developed  Her girlfriend noted her lips turned blue  She was given albuterol treatments at home prior to arrival    Patient is in moderate distress and shaking upon arrival, is unable to further clarify history  EMS states she was in rapid AFib prior to arrival       History provided by:  Patient and EMS personnel  History limited by:  Acuity of condition   used: No    Shortness of Breath   Severity:  Moderate  Onset quality:  Gradual  Duration:  1 week  Timing:  Constant  Progression:  Worsening  Chronicity:  New  Associated symptoms: cough, fever and vomiting        Prior to Admission Medications   Prescriptions Last Dose Informant Patient Reported? Taking?    Cholecalciferol (RA Vitamin D-3) 50 MCG (2000 UT) CAPS   No No   Sig: Take 1 capsule (2,000 Units total) by mouth daily   IPRATROPIUM-ALBUTEROL IN   Yes No   Sig: Inhale 2 5 mg 4 (four) times a day   JANUMET  MG per tablet   Yes No   RA VITAMIN D-3 2000 units CAPS   No No   Sig: take 2 capsules by mouth daily   Ventolin  (90 Base) MCG/ACT inhaler   No No   Sig: Inhale 2 puffs every 4 (four) hours   albuterol (2 5 mg/3 mL) 0 083 % nebulizer solution   No No   Sig: Take 1 vial (2 5 mg total) by nebulization every 6 (six) hours as needed for wheezing or shortness of breath   atorvastatin (LIPITOR) 10 mg tablet   No No   Sig: Take 1 tablet (10 mg total) by mouth daily   fluticasone-salmeterol (ADVAIR, WIXELA) 250-50 mcg/dose inhaler   Yes No   Sig: Inhale 1 puff 2 (two) times a day Rinse mouth after use  folic acid (FOLVITE) 1 mg tablet   Yes No   Sig: Take 1,000 mcg by mouth daily   furosemide (LASIX) 40 mg tablet   Yes No   Sig: Take 40 mg by mouth daily   gabapentin, once-daily, (GRALISE) 300 MG tablet   Yes No   Sig: Take 1 tablet by mouth 3 (three) times a day   glimepiride (AMARYL) 1 mg tablet   Yes No   Sig: Take 1 mg by mouth daily   ipratropium (ATROVENT) 0 02 % nebulizer solution   No No   Sig: INHALE CONTENTS OF 1 VIAL IN NEBULIZER FOUR TIMES A DAY   ipratropium-albuterol (COMBIVENT RESPIMAT) inhaler   No No   Sig: Inhale 1 puff 4 (four) times a day   iron polysaccharides (FERREX) 150 mg capsule   No No   Sig: Take 1 capsule (150 mg total) by mouth 2 (two) times a day   levothyroxine 88 mcg tablet   No No   Sig: Take 1 tablet (88 mcg total) by mouth daily   metoprolol succinate (TOPROL-XL) 25 mg 24 hr tablet   No No   Sig: Take 1 tablet (25 mg total) by mouth daily   pantoprazole (PROTONIX) 40 mg tablet   No No   Sig: Take 1 tablet (40 mg total) by mouth daily   vitamin B-12 (VITAMIN B-12) 500 mcg tablet   No No   Sig: Take 1 tablet (500 mcg total) by mouth daily   warfarin (COUMADIN) 5 mg tablet   No No   Sig: Take 1 tablet (5 mg total) by mouth daily      Facility-Administered Medications: None       Past Medical History:   Diagnosis Date    Anemia     Arthritis     Cancer of kidney (HCC)     Chronic kidney disease     Diabetes mellitus (HCC)     Disease of thyroid gland     HLD (hyperlipidemia)     Hypertension     Ovarian cancer [facility])        Past Surgical History:   Procedure Laterality Date    GALLBLADDER SURGERY  05/01/2004    HYSTERECTOMY  06/01/2018    NEPHRECTOMY Right 08/02/2018       Family History   Problem Relation Age of Onset    No Known Problems Mother     No Known Problems Father      I have reviewed and agree with the history as documented      E-Cigarette/Vaping    E-Cigarette Use Never User      E-Cigarette/Vaping Substances     Social History     Tobacco Use    Smoking status: Former Smoker     Packs/day: 2 00     Years: 30 00     Pack years: 60 00    Smokeless tobacco: Never Used   Substance Use Topics    Alcohol use: No    Drug use: No       Review of Systems   Constitutional: Positive for chills and fever  Respiratory: Positive for cough and shortness of breath  Gastrointestinal: Positive for vomiting  All other systems reviewed and are negative  Physical Exam  Physical Exam  Vitals signs and nursing note reviewed  Constitutional:       General: She is in acute distress  Appearance: She is well-developed  She is ill-appearing and diaphoretic  Comments: Severely obese, in acute distress, shaking with chills  HENT:      Head: Normocephalic and atraumatic  Eyes:      Extraocular Movements: Extraocular movements intact  Pupils: Pupils are equal, round, and reactive to light  Neck:      Musculoskeletal: Normal range of motion and neck supple  Cardiovascular:      Rate and Rhythm: Tachycardia present  Rhythm irregular  Pulmonary:      Effort: Tachypnea and accessory muscle usage present  Breath sounds: Decreased breath sounds present  Skin:     General: Skin is warm  Capillary Refill: Capillary refill takes less than 2 seconds  Comments: Large lower abdominal pannus: underneath this is severely erythematous and excoriated with fungal rash/candida present  Tender to palpation  No crepitus  Back: sacral and ischial stage 1 pressure ulcers  Neurological:      General: No focal deficit present  Mental Status: She is alert     Psychiatric:         Behavior: Behavior normal       Comments: Very anxious appearing         Vital Signs  ED Triage Vitals [09/09/20 0508]   Temperature Pulse Resp BP SpO2   (!) 102 9 °F (39 4 °C) -- -- -- --      Temp Source Heart Rate Source Patient Position - Orthostatic VS BP Location FiO2 (%)   Tympanic -- -- -- --      Pain Score --           There were no vitals filed for this visit        Visual Acuity      ED Medications  Medications   sodium chloride 0 9 % bolus 1,000 mL (has no administration in time range)     Followed by   sodium chloride 0 9 % bolus 1,000 mL (has no administration in time range)   cefepime (MAXIPIME) IVPB (premix) 2,000 mg 50 mL (has no administration in time range)       Diagnostic Studies  Results Reviewed     Procedure Component Value Units Date/Time    Blood gas, arterial [[id number]]     Lab Status:  No result Specimen:  Blood, Arterial     Magnesium [[id number]]     Lab Status:  No result Specimen:  Blood     CK Total with Reflex CKMB [[id number]]     Lab Status:  No result Specimen:  Blood     CBC and differential [[id number]]     Lab Status:  No result Specimen:  Blood     Comprehensive metabolic panel [[id number]]     Lab Status:  No result Specimen:  Blood     Lactic acid [[id number]]     Lab Status:  No result Specimen:  Blood     Procalcitonin with AM Reflex [[id number]]     Lab Status:  No result Specimen:  Blood     Protime-INR [[id number]]     Lab Status:  No result Specimen:  Blood     APTT [[id number]]     Lab Status:  No result Specimen:  Blood     Blood culture #1 [[id number]]     Lab Status:  No result Specimen:  Blood     Blood culture #2 [[id number]]     Lab Status:  No result Specimen:  Blood     UA w Reflex to Microscopic w Reflex to Culture [[id number]]     Lab Status:  No result Specimen:  Urine, Clean Catch     Lipase [[id number]]     Lab Status:  No result Specimen:  Blood     Troponin I [[id number]]     Lab Status:  No result Specimen:  Blood                  XR chest portable    (Results Pending)              Procedures  ECG 12 Lead Documentation Only    Date/Time: 9/9/2020 5:34 AM  Performed by: Juliet Bianchi DO  Authorized by: Juliet Bianchi DO     Indications / Diagnosis:  Afib rapid  ECG reviewed by me, the ED Provider: yes    Patient location:  ED  Previous ECG:     Comparison to cardiac monitor: Yes    Interpretation:     Interpretation: abnormal    Rate:     ECG rate:  150    ECG rate assessment: tachycardic    Rhythm:     Rhythm: atrial fibrillation    Ectopy:     Ectopy: none    QRS:     QRS axis:  Normal  Conduction:     Conduction: normal    ST segments:     ST segments:  Normal  T waves:     T waves: normal      CriticalCare Time  Performed by: Kimi Jesus DO  Authorized by: Kimi Jesus DO     Critical care provider statement:     Critical care time (minutes):  50    Critical care start time:  9/9/2020 6:00 AM    Critical care end time:  9/9/2020 6:50 AM    Critical care time was exclusive of:  Separately billable procedures and treating other patients and teaching time    Critical care was necessary to treat or prevent imminent or life-threatening deterioration of the following conditions:  Dehydration, sepsis and respiratory failure    Critical care was time spent personally by me on the following activities:  Obtaining history from patient or surrogate, development of treatment plan with patient or surrogate, discussions with consultants, evaluation of patient's response to treatment, examination of patient, review of old charts, re-evaluation of patient's condition, ordering and review of radiographic studies, ordering and review of laboratory studies, ordering and performing treatments and interventions and interpretation of cardiac output measurements    I assumed direction of critical care for this patient from another provider in my specialty: no               ED Course  ED Course as of Sep 09 1910   Wed Sep 09, 2020   2852 D/w Paul Garduno, accepts to medicine service under Dr Noriega Foot  Stepdown unit  2116 Radiologist called - called small bowel obstruction with associated ventral hernia, will transfer out for surgical consult                                            MDM  Number of Diagnoses or Management Options  Hypoxia: new and requires workup  Pneumonia: new and requires workup  Sepsis [facility]): new and requires workup  UTI (urinary tract infection): new and requires workup  Diagnosis management comments: 18-year-old female with shortness of breath, chills, rigors and fever, presenting in rapid AFib with tachypnea and fever of 102 9  Meeting SIRS criteria, lactic acid 2 8  Blood pressure initially 92/76 but now well over 328 systolic  Source appears to be possibly urine versus pneumonia, CT chest abdomen pelvis ordered to clarify source of infection  Her arterial blood gas is consistent with respiratory alkalosis, she is not retaining CO2, she is actually hyperventilating likely due to the fever and sepsis  The patient is improving with IV fluids, Tylenol, Cardizem, given cefepime and vancomycin for broad-spectrum antibiotic coverage  Admitted to step-down unit  Amount and/or Complexity of Data Reviewed  Clinical lab tests: ordered and reviewed  Tests in the radiology section of CPT®: ordered and reviewed  Tests in the medicine section of CPT®: ordered and reviewed  Decide to obtain previous medical records or to obtain history from someone other than the patient: yes  Discuss the patient with other providers: yes    Risk of Complications, Morbidity, and/or Mortality  Presenting problems: high  Diagnostic procedures: high  Management options: high    Patient Progress  Patient progress: improved      Disposition  Final diagnoses:   None     ED Disposition     None      Follow-up Information    None         Patient's Medications   Discharge Prescriptions    No medications on file     No discharge procedures on file      PDMP Review     None          ED Provider  Electronically Signed by           Yelitza Curtis DO  09/09/20 2571

## 2020-09-09 NOTE — ED NOTES
Dr Vincent Cantu aware of BP  3L infusing as bolus as per orders             Tessa Toure, RN  09/09/20 3235

## 2020-09-09 NOTE — SEPSIS NOTE
Sepsis Note   Faustino Duke 63 y o  female MRN: [id number]  Unit/Bed#: ED 04 Encounter: [id number]      qSOFA     Row Name 09/09/20 4840 09/09/20 0740 09/09/20 9656 09/09/20 0722 09/09/20 0716    Altered mental status GCS < 15          0    Respiratory Rate > / =22  0  0  1  1      Systolic BP < / =826  1  1    0      Q Sofa Score  1  1  1  1  0    Row Name 09/09/20 0707 09/09/20 0706 09/09/20 0645 09/09/20 0625 09/09/20 0620    Altered mental status GCS < 15              Respiratory Rate > / =22  0  0  0  1  0    Systolic BP < / =811  0  1  1  0  0    Q Sofa Score  0  1  1  1  0    Row Name 09/09/20 0552 09/09/20 0545 09/09/20 0530 09/09/20 0520 09/09/20 0510    Altered mental status GCS < 15  0            Respiratory Rate > / =22    1  1  1  1    Systolic BP < / =706    0  0  0  1    Q Sofa Score  1  1  1  1  2        Initial Sepsis Screening     Row Name 09/09/20 0753                Is the patient's history suggestive of a new or worsening infection? (!) Yes (Proceed)  -RP        Suspected source of infection  pneumonia  -RP        Are two or more of the following signs & symptoms of infection both present and new to the patient? (!) Yes (Proceed)  -RP        Indicate SIRS criteria  Hyperthemia > 38 3C (100 9F);WBC > 10% bands; Tachycardia > 90 bpm;Tachypnea > 20 resp per min;Leukocytosis (WBC > 32231 IJL)  -RP        If the answer is yes to both questions, suspicion of sepsis is present          If severe sepsis is present AND tissue hypoperfusion perists in the hour after fluid resuscitation or lactate > 4, the patient meets criteria for SEPTIC SHOCK          Are any of the following organ dysfunction criteria present within 6 hours of suspected infection and SIRS criteria that are NOT considered to be chronic conditions?   (!) Yes  -RP        Organ dysfunction  Lactate > 2 0 mmol/L;Creatinine > 0 5 mg/dl ABOVE BASELINE  -RP        Date of presentation of severe sepsis  09/09/20  -RP Time of presentation of severe sepsis  0754  -RP        Tissue hypoperfusion persists in the hour after crystalloid fluid administration, evidenced, by either:          Was hypotension present within one hour of the conclusion of crystalloid fluid administration? No  -RP        Date of presentation of septic shock          Time of presentation of septic shock            User Key  (r) = Recorded By, (t) = Taken By, (c) = Cosigned By    Initials Name Provider Type    RP Colleen Puente MD Physician               Default Flowsheet Data (last 720 hours)      Sepsis Reassess     Row Name 09/09/20 0804                   Repeat Volume Status and Tissue Perfusion Assessment Performed    Repeat Volume Status and Tissue Perfusion Assessment Performed  Yes  -RP           Volume Status and Tissue Perfusion Post Fluid Resuscitation * Must Document All *    Vital Signs Reviewed (HR, RR, BP, T)  Yes  -RP        Shock Index Reviewed  Yes  -RP        Arterial Oxygen Saturation Reviewed (POx, SaO2 or SpO2)  Yes (comment %)  -RP        Cardio  (!) Normal S1/S2; Irregular rhythm; Tachycardia  -RP        Pulmonary  Normal effort;Clear to auscultation  -RP        Capillary Refill  Brisk  -RP        Peripheral Pulses  Radial  -RP        Peripheral Pulse  +2  -RP        Skin  Warm  -RP        Urine output assessed  Adequate  -RP           *OR*   Intensive Monitoring- Must Document One of the Following Four *:    Vital Signs Reviewed          * Central Venous Pressure (CVP or RAP)          * Central Venous Oxygen (SVO2, ScvO2 or Oxygen saturation via central catheter)          * Bedside Cardiovascular US in IVC diameter and % collapse          * Passive Leg Raise OR Crystalloid Challenge            User Key  (r) = Recorded By, (t) = Taken By, (c) = Cosigned By    Initials Name Provider Type    RP Colleen Puente MD Physician

## 2020-09-09 NOTE — CONSULTS
Consult / ICU Acceptance Note - [address] 63 y o  female MRN: [id number]  Unit/Bed#: MICU 06 Encounter: [id number]      -------------------------------------------------------------------------------------------------------------  Chief Complaint: No complaints at time of my evaluation  History of Present Illness   HX and PE limited by: not limited   Xavier Ora is a 63 y o  female w PMH CKD, HTN, HLD, DM, morbid obesity, ovarian cancer, kidney cancer, thyroid disease who presented to [facility] ED today w rigors, fever and AF w RVR  Her HR on presentation was in the 150s for which she was treated w IV diltiazem boluses and a gtt was initiated  She was found to be septic and blood cultures were obtained, she received a 30cc/kg fluid bolus and she had cefepime and vanc started for broad spectrum coverage  She had a CT c/a/p which revealed a high grade SBO 2/2 a large R ventral wall hernia and a LLL infiltrate suggesting atelectasis v pneumonia  She has had a negative covid test  She was transferred to [facility] for acute care surgery and surgical critical care consultation  Her PSH is significant for multiple prior abdominal surgeries  She is however continuing to have bowel movements and pass flatus  She is not having severe abdominal pain  She was questioned regarding erythema on her abdomen and her girlfriend reports she noticed this when they arrived in the ED at [facility]  Per girlfriend she was feeling well yesterday and they went on their normal walk at 18:30 after dinner  This morning at 4:00 she woke up with a panic attack which prompted her ED visit  History obtained from chart review, the patient and girlfriend present at bedside    -------------------------------------------------------------------------------------------------------------  Assessment and Plan:    Neuro:   · Analgesia  · Not requiring any pain medication  · Delirium precautions, sleep hygiene, daily CAM-ICU      CV:  · AF w RVR   · Received cardizem boluses in the ED and was started on a cardizem gtt   · Hold cardizem gtt  · Lopressor 5mg IV Q6 scheduled   · Hx of AF for which she is on metoprolol and coumadin at home   · F/u INR  · HLD  · lipitor     Pulm:  · COPD   · On home O2, 2-3L  · Atrovent, breo, xopenex scheduled   · Respiratory alkalosis   · She was hyperventilating in the ED, anxious and febrile which likely contributed   GI:  · High grade SBO  · NPO   · NGT if needed  · Currently passing gas / having bowel movements and not vomiting     :   · Stringer not currently indicated as patient able to spontaneously void  · Monitor urine output closely       F/E/N:   · Fluid  · mIVF isolyte at 100/hr   · Nutrition  · NPO   · Will place NGT if she has ongoing emesis   · Lytes  · Replete PRN       Heme/Onc:   · DVT ppx   · SQH / SCDs       Endo:  · Hypothyroidism   · Levothyroxine   · DM  · Monitor w serial accuchecks  · Goal -180    ID:   · Severe sepsis   · Lactic 2 8, now cleared following fluid resuscitation  · Received vanc / cefepime in the ED at [facility]  · Appears to have cellulitis of her abdomen as well as possible LLL pna  · Will treat w ceftriaxone and send MRSA swab, add vanc as needed, f/u culture growth       MSK/Skin:   · Pressure ulcer of thighs    · Present on arrival to ED   · Wound care consult  · Per girlfriend lesion on sacrum is a chronic ulcer and the skin between thighs is healing and dressed daily, used to be worse when she was 400lbs   · Repositioning to prevent further breakdown     Disposition: Admit to Stepdown Level 1  Code Status: Level 1 - Full Code  --------------------------------------------------------------------------------------------------------------  Review of Systems   Constitutional: Positive for chills and fever  Negative for diaphoresis  HENT: Negative for congestion and sore throat  Eyes: Positive for discharge  Negative for visual disturbance  Respiratory: Negative for cough, chest tightness, shortness of breath and wheezing  Cardiovascular: Negative for chest pain and leg swelling  Gastrointestinal: Positive for abdominal pain  Negative for constipation, diarrhea, nausea and vomiting  Genitourinary: Negative for difficulty urinating, dysuria, frequency, hematuria, urgency, vaginal bleeding, vaginal discharge and vaginal pain  Musculoskeletal: Negative for arthralgias and myalgias  Skin: Positive for color change  Neurological: Negative for dizziness, weakness, light-headedness, numbness and headaches  Psychiatric/Behavioral: The patient is not nervous/anxious  A 12-point, complete review of systems was reviewed and negative except as stated above     Physical Exam  Vitals signs and nursing note reviewed  Constitutional:       General: She is not in acute distress  Appearance: She is well-developed  She is not diaphoretic  Comments: Morbidly obese female  Resting comfortably supine in bed, nad   Talking in full sentences    HENT:      Head: Normocephalic and atraumatic  Eyes:      Pupils: Pupils are equal, round, and reactive to light  Neck:      Musculoskeletal: Neck supple  Trachea: No tracheal deviation  Cardiovascular:      Rate and Rhythm: Normal rate and regular rhythm  Heart sounds: No murmur  No friction rub  No gallop  Comments: IRIR  tachycardic  Pulmonary:      Effort: Pulmonary effort is normal  No respiratory distress  Breath sounds: Normal breath sounds  No wheezing or rales  Comments: 2L NC   Abdominal:      General: Bowel sounds are normal  There is no distension  Palpations: Abdomen is soft  There is no mass  Tenderness: There is no abdominal tenderness  There is no guarding        Comments: Extensive cellulitis across the lower abdomen, warm to touch, no pain out of proportion, no crepitus   Crease underlying panus with beefy red demarcated skin Musculoskeletal:         General: No deformity  Skin:     General: Skin is warm and dry  Comments: Skin breakdown between thighs  Chronic ulcer noted to the sacrum   Neurological:      Mental Status: She is alert and oriented to person, place, and time  Psychiatric:         Behavior: Behavior normal        --------------------------------------------------------------------------------------------------------------  Vitals:   Vitals:    09/09/20 1200 09/09/20 1230 09/09/20 1300 09/09/20 1328   BP: (!) 86/47 102/56 112/62 111/62   BP Location:       Pulse: 98 96 100 100   Resp: 19 21 22 21   Temp:       TempSrc:       SpO2: 94% 93% 96% 96%   Weight:    132 kg (290 lb 5 5 oz)     Temp  Min: 99 9 °F (37 7 °C)  Max: 102 9 °F (39 4 °C)  IBW: -92 5 kg     Body mass index is 49 84 kg/m²    N/A    Laboratory and Diagnostics:  Results from last 7 days   Lab Units 09/09/20  1318 09/09/20  0534 09/09/20  0524   WBC Thousand/uL 25 32*  --  14 69*   HEMOGLOBIN g/dL 10 5*  --  11 7   I STAT HEMOGLOBIN g/dl  --  11 9  --    HEMATOCRIT % 35 1  --  38 2   HEMATOCRIT, ISTAT %  --  35  --    PLATELETS Thousands/uL 284  --  330   BANDS PCT %  --   --  11*   MONO PCT %  --   --  1*     Results from last 7 days   Lab Units 09/09/20  0534 09/09/20  0524   SODIUM mmol/L  --  139   POTASSIUM mmol/L  --  4 4   CHLORIDE mmol/L  --  99   CO2 mmol/L  --  31   CO2, I-STAT mmol/L 28  --    ANION GAP mmol/L  --  9   BUN mg/dL  --  25*   CREATININE mg/dL  --  1 90*   CALCIUM mg/dL  --  9 4   GLUCOSE RANDOM mg/dL  --  157*   ALT U/L  --  5   AST U/L  --  11*   ALK PHOS U/L  --  148 9*   ALBUMIN g/dL  --  3 5   TOTAL BILIRUBIN mg/dL  --  0 43     Results from last 7 days   Lab Units 09/09/20  0524   MAGNESIUM mg/dL 1 5*      Results from last 7 days   Lab Units 09/09/20  0524   INR  2 03*   PTT seconds 35*      Results from last 7 days   Lab Units 09/09/20  0524   TROPONIN I ng/mL <0 03     Results from last 7 days   Lab Units 09/09/20  0811 09/09/20  0524   LACTIC ACID mmol/L 1 5 2 8*     ABG:    VBG:          Micro:        EKG: as per tele AF w controlled rate   Imaging: I have personally reviewed pertinent reports        Historical Information   Past Medical History:   Diagnosis Date    Anemia     Arthritis     [facility]     Chronic kidney disease     [facility]     Disease of thyroid gland     HLD (hyperlipidemia)     Hypertension     [facility]      Past Surgical History:   Procedure Laterality Date    GALLBLADDER SURGERY  05/01/2004    HYSTERECTOMY  06/01/2018    NEPHRECTOMY Right 08/02/2018     Social History   Social History     Substance and Sexual Activity   Alcohol Use No     Social History     Substance and Sexual Activity   Drug Use No     Social History     Tobacco Use   Smoking Status Former Smoker    Packs/day: 2 00    Years: 30 00    Pack years: 60 00   Smokeless Tobacco Never Used     Exercise History: walks every other day around her apartment complex w her girlfriend and does arm exercises on alternating days   Family History:   Family History   Problem Relation Age of Onset    No Known Problems Mother     No Known Problems Father      I have reviewed this patient's family history and commented on sigificant items within the HPI      Medications:  Current Facility-Administered Medications   Medication Dose Route Frequency    albuterol (PROVENTIL HFA,VENTOLIN HFA) inhaler 2 puff  2 puff Inhalation Q4H PRN    albuterol inhalation solution 2 5 mg  2 5 mg Nebulization Q4H PRN    atorvastatin (LIPITOR) tablet 10 mg  10 mg Oral Daily    cefTRIAXone (ROCEPHIN) 1,000 mg in dextrose 5 % 50 mL IVPB  1,000 mg Intravenous Q24H    chlorhexidine (PERIDEX) 0 12 % oral rinse 15 mL  15 mL Swish & Spit Q12H [facility] & longterm    fluticasone-vilanterol (BREO ELLIPTA) 100-25 mcg/inh inhaler 1 puff  1 puff Inhalation Daily    heparin (porcine) subcutaneous injection 7,500 Units  7,500 Units Subcutaneous Q8H [address]    ipratropium (ATROVENT) 0 02 % inhalation solution 0 5 mg  0 5 mg Nebulization TID    levalbuterol (XOPENEX) inhalation solution 1 25 mg  1 25 mg Nebulization TID    levothyroxine tablet 88 mcg  88 mcg Oral Early Morning    metoprolol (LOPRESSOR) injection 5 mg  5 mg Intravenous Q6H    multi-electrolyte (PLASMALYTE-A/ISOLYTE-S PH 7 4) IV solution  100 mL/hr Intravenous Continuous    pantoprazole (PROTONIX) EC tablet 40 mg  40 mg Oral Early Morning     Home medications:  Prior to Admission Medications   Prescriptions Last Dose Informant Patient Reported? Taking? Cholecalciferol (RA Vitamin D-3) 50 MCG (2000 UT) CAPS   No No   Sig: Take 1 capsule (2,000 Units total) by mouth daily   Patient taking differently: Take 400 Units by mouth daily    IPRATROPIUM-ALBUTEROL IN   Yes No   Sig: Inhale 2 5 mg 4 (four) times a day   Ventolin  (90 Base) MCG/ACT inhaler   No No   Sig: Inhale 2 puffs every 4 (four) hours   albuterol (2 5 mg/3 mL) 0 083 % nebulizer solution   No No   Sig: Take 1 vial (2 5 mg total) by nebulization every 6 (six) hours as needed for wheezing or shortness of breath   atorvastatin (LIPITOR) 10 mg tablet   No No   Sig: Take 1 tablet (10 mg total) by mouth daily   fluticasone-salmeterol (ADVAIR, WIXELA) 250-50 mcg/dose inhaler   Yes No   Sig: Inhale 1 puff 2 (two) times a day Rinse mouth after use     folic acid (FOLVITE) 1 mg tablet   Yes No   Sig: Take 1,000 mcg by mouth daily   furosemide (LASIX) 40 mg tablet   Yes No   Sig: Take 40 mg by mouth daily   ipratropium (ATROVENT) 0 02 % nebulizer solution   No No   Sig: INHALE CONTENTS OF 1 VIAL IN NEBULIZER FOUR TIMES A DAY   iron polysaccharides (FERREX) 150 mg capsule   No No   Sig: Take 1 capsule (150 mg total) by mouth 2 (two) times a day   Patient taking differently: Take 150 mg by mouth daily    levothyroxine 88 mcg tablet   No No   Sig: Take 1 tablet (88 mcg total) by mouth daily   Patient taking differently: Take 100 mcg by mouth daily    metoprolol succinate (TOPROL-XL) 25 mg 24 hr tablet   No No   Sig: Take 1 tablet (25 mg total) by mouth daily   pantoprazole (PROTONIX) 40 mg tablet   No No   Sig: Take 1 tablet (40 mg total) by mouth daily   vitamin B-12 (VITAMIN B-12) 500 mcg tablet   No No   Sig: Take 1 tablet (500 mcg total) by mouth daily   warfarin (COUMADIN) 5 mg tablet   No No   Sig: Take 1 tablet (5 mg total) by mouth daily   Patient taking differently: Take 3 mg by mouth daily       Facility-Administered Medications: None     Allergies:  No Known Allergies  ------------------------------------------------------------------------------------------------------------  Advance Directive and Living Will:      Power of :    POLST:    ------------------------------------------------------------------------------------------------------------  Anticipated Length of Stay is > 2 midnights    Care Time Delivered:   Upon my evaluation, this patient had a high probability of imminent or life-threatening deterioration due to severe sepsis , which required my direct attention, intervention, and personal management  I have personally provided 45 minutes (1100 to 1245) of critical care time, exclusive of procedures, teaching, family meetings, and any prior time recorded by providers other than myself  Parrish Childs PA-C        Portions of the record may have been created with voice recognition software  Occasional wrong word or "sound a like" substitutions may have occurred due to the inherent limitations of voice recognition software    Read the chart carefully and recognize, using context, where substitutions have occurred

## 2020-09-09 NOTE — RESPIRATORY THERAPY NOTE
RT Protocol Note  Litzy Madison 63 y o  female MRN: [id number]  Unit/Bed#: MICU 06 Encounter: [id number]    Assessment    Active Problems:    * No active hospital problems   *      Home Pulmonary Medications:  Albuterol Atrovent       Past Medical History:   Diagnosis Date    Anemia     Arthritis     Cancer of kidney (HCC)     Chronic kidney disease     [facility]     Disease of thyroid gland     HLD (hyperlipidemia)     Hypertension     Ovarian cancer ([facility] )      Social History     Socioeconomic History    Marital status: Single     Spouse name: Not on file    Number of children: Not on file    Years of education: Not on file    Highest education level: Not on file   Occupational History    Not on file   Social Needs    Financial resource strain: Not on file    Food insecurity     Worry: Not on file     Inability: Not on file    Transportation needs     Medical: Not on file     Non-medical: Not on file   Tobacco Use    Smoking status: Former Smoker     Packs/day: 2 00     Years: 30 00     Pack years: 60 00    Smokeless tobacco: Never Used   Substance and Sexual Activity    Alcohol use: No    Drug use: No    Sexual activity: Not on file   Lifestyle    Physical activity     Days per week: Not on file     Minutes per session: Not on file    Stress: Not on file   Relationships    Social connections     Talks on phone: Not on file     Gets together: Not on file     Attends [employer] service: Not on file     Active member of club or organization: Not on file     Attends meetings of clubs or organizations: Not on file     Relationship status: Not on file    Intimate partner violence     Fear of current or ex partner: Not on file     Emotionally abused: Not on file     Physically abused: Not on file     Forced sexual activity: Not on file   Other Topics Concern    Not on file   Social History Narrative    · Most recent tobacco use screenin2020      · Do you currently or have you served in the [employer]:   No      · Were you activated, into active duty, as a member of the [employer] or as a Reservist:   No      ·     Last modified by [email or web address]   02-, 10:39        Subjective         Objective    Physical Exam:   Assessment Type: Assess only  General Appearance: Alert, Awake  Respiratory Pattern: Normal  Chest Assessment: Chest expansion symmetrical  Bilateral Breath Sounds: Diminished    Vitals:  Blood pressure 102/56, pulse 96, temperature 99 9 °F (37 7 °C), temperature source Oral, resp  rate 21, SpO2 93 %, not currently breastfeeding  Imaging and other studies: I have personally reviewed pertinent reports  Plan    Respiratory Plan: No distress/Pulmonary history, Home Bronchodilator Patient pathway        Resp Comments: Pt assessed per Respiratory Protocol  Pt has hx of Copd and takee meds at home  Will keep pt on her home regimen  Tx ordered TID and PRN   with Xopenex/atrovent

## 2020-09-09 NOTE — ED NOTES
MICU - 6, [facility] P/U 0830  Phone number [phone]   Dr Roopa Vaca accepting physician     Pelon Hendrickson, LEONIE  09/09/20 0199

## 2020-09-09 NOTE — ED NOTES
Blood gas processed on I-stat 9/9/20 0530 is mislabeled as venous by default, but is actually arterial

## 2020-09-09 NOTE — ED NOTES
3rd liter NS infusing at 125 cc/hr, 1250 mg vanco, and 2g mag sulfate infusing at this time  Patient extremely anxious regarding transfer and CT findings  Offered to ask for antianxiety medications, patient and patient partner refused           Douglas Hensley RN  09/09/20 9134

## 2020-09-09 NOTE — WOUND OSTOMY CARE
Progress Note - Wound   Baylee Harris 63 y o  female MRN: [id number]  [facility] [id number]      Assessment: Patient is seen as a request by the RN on admission to the MICU   The patient is alert and pleasant for the skin care assessment   Patient is assessed while in bed on a low air loss critical care bed  Continent of bowel and bladder   Patient reports of areas in the groin and abdominal pannus of developing pustule areas and then being able to express creamy drainage from the areas   She has had this for some time   Reports of having a boil on the right buttocks that drained creamy drainage and left a scar  Assessment Findings   1  Bilateral heels dry and intact   2  Sacral  cleft - linear MASD 100% pink in color  3  Inner groin area and abdominal pannus area - noted follicular occlusions and pocked marked areas in appearance of hidradenitis of the areas   Patient with old scarred areas on the groin and pannus area   4  Noted a candidiasis of the abdominal pannus and the inner groin area related to the moisture   5  Right buttocks - small partial thickness area present on admission with noted old scarring hyperpigmentation of the [place] wound area   Discussed the plan of care to the areas with mixed moisture and the hidradenitis areas   Skin Care orders:  1-Hydraguard to bilateral heels BID and PRN  2-EHOB cushion when out of bed in chair  3-Moisturize skin daily with skin nourishing cream  4-Elevate heels to offload pressure  5-Turn/reposition q2h for pressure re-distribution on skin  6  Sacral / groin/ inner thigh  and abdominal pannus - cleanse with soap and water well then pat dry then apply katarina antifungal cream bid and prn         Objective:    Vitals: Blood pressure 119/70, pulse 100, temperature 99 6 °F (37 6 °C), temperature source Oral, resp  rate 22, height 5' 4" (1 626 m), weight 132 kg (290 lb 5 5 oz), SpO2 96 %, not currently breastfeeding  ,Body mass index is 49 84 kg/m²  Wound 09/09/20 Other (comment) Buttocks Right (Active)   Wound Image   09/09/20 0945   Wound Description Fragile; Other (Comment) 09/09/20 1700   Pressure Injury Stage  09/09/20 0846   [place]-wound Assessment Dry; Intact 09/09/20 1700   Wound Length (cm) 0 2 cm 09/09/20 1700   Wound Width (cm) 0 2 cm 09/09/20 1700   Wound Depth (cm) 0 1 cm 09/09/20 1700   Wound Surface Area (cm^2) 0 04 cm^2 09/09/20 1700   Wound Volume (cm^3) 0 cm^3 09/09/20 1700   Calculated Wound Volume (cm^3) 0 cm^3 09/09/20 1700   Drainage Amount None 09/09/20 1700   Non-staged Wound Description Partial thickness 09/09/20 1700   Dressing Open to air 09/09/20 1328   Patient Tolerance Tolerated well 09/09/20 1700       Wound 09/09/20 MASD Sacrum Mid (Active)   Wound Image   09/09/20 0946   Wound Description Clean;Fragile;[place] 09/09/20 1700   [place]-wound Assessment Clean;Dry; Intact 09/09/20 1700   Wound Length (cm) 3 cm 09/09/20 1700   Wound Width (cm) 0 2 cm 09/09/20 1700   Wound Depth (cm) 0 1 cm 09/09/20 1700   Wound Surface Area (cm^2) 0 6 cm^2 09/09/20 1700   Wound Volume (cm^3) 0 06 cm^3 09/09/20 1700   Calculated Wound Volume (cm^3) 0 06 cm^3 09/09/20 1700   Drainage Amount Scant 09/09/20 1700   Drainage Description Serosanguineous 09/09/20 1700   Non-staged Wound Description Partial thickness 09/09/20 1700   Treatments Cleansed 09/09/20 1700   Dressing Protective barrier 09/09/20 1700   Patient Tolerance Tolerated well 09/09/20 1700       Wound 09/09/20 Hidradenitis Thigh Posterior;Proximal;Right (Active)   Wound Image   09/09/20 0948   Wound Description Beefy red;Fragile;Pink;[place] 09/09/20 1700   [place]-wound Assessment Fragile; Hyperpigmented 09/09/20 1700   Wound Length (cm) 11 cm 09/09/20 0948   Wound Width (cm) 5 cm 09/09/20 0948   Wound Surface Area (cm^2) 55 cm^2 09/09/20 0948   Drainage Amount Scant 09/09/20 1700   Drainage Description Serous 09/09/20 1700   Non-staged Wound Description Partial thickness 09/09/20 1700 Treatments Cleansed 09/09/20 1700   Dressing Open to air;Protective barrier 09/09/20 1700   Patient Tolerance Tolerated well 09/09/20 1700       Wound 09/09/20 MASD Other (Comment) Abdomen Lower;Medial (Active)   Wound Image   09/09/20 0949   Wound Description Fragile;[place] 09/09/20 1700   [place]-wound Assessment Clean;Fragile 09/09/20 1700   Wound Length (cm) 3 cm 09/09/20 1700   Wound Width (cm) 11 cm 09/09/20 1700   Wound Depth (cm) 0 1 cm 09/09/20 1700   Wound Surface Area (cm^2) 33 cm^2 09/09/20 1700   Wound Volume (cm^3) 3 3 cm^3 09/09/20 1700   Calculated Wound Volume (cm^3) 3 3 cm^3 09/09/20 1700   Drainage Amount Scant 09/09/20 1700   Drainage Description Serous 09/09/20 1700   Non-staged Wound Description Partial thickness 09/09/20 1700   Treatments Cleansed 09/09/20 1700   Dressing Open to air 09/09/20 1328   Patient Tolerance Tolerated well 09/09/20 1700       Wound care will follow weekly call with questions or concerns     Amanda Morgan RN BSN [id number]

## 2020-09-09 NOTE — ED CARE HANDOFF
Emergency Department Sign Out Note        Sign out and transfer of care from Dr Kathy Gonzalez  See Separate Emergency Department note  The patient, Sendy Sarkar, was evaluated by the previous provider for severe sepsis, SBO, pneumonia, rapid afib  Workup Completed:  Labs and CT scan    ED Course / Workup Pending (followup): Stabilization and transfer  Sepsis Reassessment      Most Recent Value   Volume Status and Tissue Perfusion Post Fluid Resuscitation * Must Document All *   Vital Signs Reviewed (HR, RR, BP, T)  Yes Filed at: 09/09/2020 0804   Shock Index Reviewed  Yes Filed at: 09/09/2020 0801   Arterial Oxygen Saturation Reviewed (POx, SaO2 or SpO2)  Yes (comment %) Filed at: 09/09/2020 7048   Cardio  (!) Normal S1/S2, Irregular rhythm, Tachycardia Filed at: 09/09/2020 7349   Pulmonary  Normal effort, Clear to auscultation Filed at: 09/09/2020 0804   Capillary Refill  Brisk Filed at: 09/09/2020 4424   Peripheral Pulses  Radial Filed at: 09/09/2020 4389   Peripheral Pulse  +2 Filed at: 09/09/2020 0804   Dorsalis Pedis     Posterior Tibialis     Skin  Warm Filed at: 09/09/2020 7671   Urine output assessed  Adequate Filed at: 09/09/2020 0848   *OR*   Intensive Monitoring- Must Document One of the Following Four *:   Vital Signs Reviewed     * Central Venous Pressure (CVP or RAP)     * Central Venous Oxygen (SVO2, ScvO2 or Oxygen saturation via central catheter)     * Bedside Cardiovascular US in IVC diameter and % collapse     CO     EF     IVC Diameter     % Collapse     * Passive Leg Raise OR Crystalloid Challenge     Passive leg exam     Crystalloid fluid challenge completed                  ED Course as of Sep 09 0930   Wed Sep 09, 2020   8015 By ideal body weight patient requires 1641 cc of fluid for 30ml/kg resuscitation      Adrianaeder Brush from surgery  States patient should go to B for surgical presence and stepdown/ICU capabilities        0745 I spoke with Dr Miguel Angel Diez at [facility]  Will accept to her service in ICU       0755 HR remains RVR in 115-120 range  Will start cardizem drip          Procedures  MDM    Disposition  Final diagnoses:   Pneumonia   Sepsis ([address] )   UTI (urinary tract infection)   Hypoxia   Small bowel obstruction ([address] )   Panniculitis   Atrial fibrillation with rapid ventricular response ([facility] )     Time reflects when diagnosis was documented in both MDM as applicable and the Disposition within this note     Time User Action Codes Description Comment    9/9/2020  6:44 AM Izetta Perks Add [J18 9] Pneumonia     9/9/2020  6:44 AM Berneta JessicaJoseBird Bollard Add [A41 9] Sepsis ([address] )     9/9/2020  6:44 AM Berneta Jessica Bird Bollard Add [N39 0] UTI (urinary tract infection)     9/9/2020  6:44 AM Izetta Perks Add [R09 02] Hypoxia     9/9/2020  7:21 AM Berneta JessicaJoseBird Bollard Add [S97 225] Small bowel obstruction ([facility] )     9/9/2020  7:52 AM Adrianna Brush Add [M79 3] Panniculitis     9/9/2020  7:53 AM Toby Andino Add [I48 91] Atrial fibrillation with rapid ventricular response [facility])       ED Disposition     ED Disposition Condition Date/Time Comment    Transfer to Another Facility-In Network  Wed Sep 9, 2020  7:12 AM       MD Documentation      Most Recent Value   Patient Condition  The patient has been stabilized such that within reasonable medical probability, no material deterioration of the patient condition or the condition of the unborn child(ana) is likely to result from the transfer   Reason for Transfer  Level of Care needed not available at this facility [surgical critical care]   Benefits of Transfer  Specialized equipment and/or services available at the receiving facility (Include comment)________________________ [facility]]   Risks of Transfer  Potential for delay in receiving treatment, Potential deterioration of medical condition, Loss of IV, Increased discomfort during transfer, Possible worsening of condition or death during transfer   Accepting Physician  Dr Jeri Lerma Name, [address] [facility]    (Name & Tel number)  Maria A Cocker - PAC   Transported by (Company and Unit #)  [employer]   Sending MD  [facility]   Provider Certification  General risk, such as traffic hazards, adverse weather conditions, rough terrain or turbulence, possible failure of equipment (including vehicle or aircraft), or consequences of actions of persons outside the control of the transport personnel, Unanticipated needs of medical equipment and personnel during transport, Risk of worsening condition, The possibility of a transport vehicle being unavailable      RN Documentation      Most [address] Name, [address] [facility]    (Name & Tel number)  Maria A Cocker - PAC   Transported by (Company and Unit #)  [employer]      Follow-up Information    None       Discharge Medication List as of 9/9/2020  9:05 AM      CONTINUE these medications which have NOT CHANGED    Details   albuterol (2 5 mg/3 mL) 0 083 % nebulizer solution Take 1 vial (2 5 mg total) by nebulization every 6 (six) hours as needed for wheezing or shortness of breath, Starting Mon 8/3/2020, Normal      atorvastatin (LIPITOR) 10 mg tablet Take 1 tablet (10 mg total) by mouth daily, Starting Fri 6/26/2020, Normal      Cholecalciferol (RA Vitamin D-3) 50 MCG (2000 UT) CAPS Take 1 capsule (2,000 Units total) by mouth daily, Starting Fri 6/26/2020, Normal      fluticasone-salmeterol (ADVAIR, WIXELA) 250-50 mcg/dose inhaler Inhale 1 puff 2 (two) times a day Rinse mouth after use , Historical Med      folic acid (FOLVITE) 1 mg tablet Take 1,000 mcg by mouth daily, Starting Wed 9/12/2018, Historical Med      furosemide (LASIX) 40 mg tablet Take 40 mg by mouth daily, Starting Wed 9/12/2018, Historical Med      ipratropium (ATROVENT) 0 02 % nebulizer solution INHALE CONTENTS OF 1 VIAL IN NEBULIZER FOUR TIMES A DAY, Normal      IPRATROPIUM-ALBUTEROL IN Inhale 2 5 mg 4 (four) times a day, Historical Med      iron polysaccharides (FERREX) 150 mg capsule Take 1 capsule (150 mg total) by mouth 2 (two) times a day, Starting Mon 8/3/2020, Normal      levothyroxine 88 mcg tablet Take 1 tablet (88 mcg total) by mouth daily, Starting Fri 6/26/2020, Normal      metoprolol succinate (TOPROL-XL) 25 mg 24 hr tablet Take 1 tablet (25 mg total) by mouth daily, Starting Fri 6/26/2020, Normal      pantoprazole (PROTONIX) 40 mg tablet Take 1 tablet (40 mg total) by mouth daily, Starting Fri 6/26/2020, Normal      Ventolin  (90 Base) MCG/ACT inhaler Inhale 2 puffs every 4 (four) hours, Starting Fri 8/7/2020, Normal      vitamin B-12 (VITAMIN B-12) 500 mcg tablet Take 1 tablet (500 mcg total) by mouth daily, Starting Fri 6/26/2020, Normal      warfarin (COUMADIN) 5 mg tablet Take 1 tablet (5 mg total) by mouth daily, Starting Fri 6/26/2020, Normal           No discharge procedures on file         ED Provider  Electronically Signed by     Sky Rocha MD  09/09/20 5649

## 2020-09-10 ENCOUNTER — APPOINTMENT (INPATIENT)
Dept: RADIOLOGY | Facility: HOSPITAL | Age: 60
DRG: 394 | End: 2020-09-10
Payer: MEDICARE

## 2020-09-10 PROBLEM — L03.90 CELLULITIS: Status: ACTIVE | Noted: 2020-09-10

## 2020-09-10 PROBLEM — Z90.5 H/O RIGHT NEPHRECTOMY: Status: ACTIVE | Noted: 2020-09-10

## 2020-09-10 LAB
ANION GAP SERPL CALCULATED.3IONS-SCNC: 5 MMOL/L (ref 4–13)
ANION GAP SERPL CALCULATED.3IONS-SCNC: 5 MMOL/L (ref 4–13)
ATRIAL RATE: 81 BPM
BASE EX.OXY STD BLDV CALC-SCNC: 93.3 % (ref 60–80)
BASE EXCESS BLDV CALC-SCNC: -0.2 MMOL/L
BASOPHILS # BLD AUTO: 0.04 THOUSANDS/ΜL (ref 0–0.1)
BASOPHILS # BLD AUTO: 0.05 THOUSANDS/ΜL (ref 0–0.1)
BASOPHILS NFR BLD AUTO: 0 % (ref 0–1)
BASOPHILS NFR BLD AUTO: 0 % (ref 0–1)
BUN SERPL-MCNC: 23 MG/DL (ref 5–25)
BUN SERPL-MCNC: 24 MG/DL (ref 5–25)
CALCIUM SERPL-MCNC: 8.4 MG/DL (ref 8.3–10.1)
CALCIUM SERPL-MCNC: 8.7 MG/DL (ref 8.3–10.1)
CHLORIDE SERPL-SCNC: 106 MMOL/L (ref 100–108)
CHLORIDE SERPL-SCNC: 109 MMOL/L (ref 100–108)
CO2 SERPL-SCNC: 25 MMOL/L (ref 21–32)
CO2 SERPL-SCNC: 28 MMOL/L (ref 21–32)
CREAT SERPL-MCNC: 1.78 MG/DL (ref 0.6–1.3)
CREAT SERPL-MCNC: 1.81 MG/DL (ref 0.6–1.3)
EOSINOPHIL # BLD AUTO: 0.03 THOUSAND/ΜL (ref 0–0.61)
EOSINOPHIL # BLD AUTO: 0.07 THOUSAND/ΜL (ref 0–0.61)
EOSINOPHIL NFR BLD AUTO: 0 % (ref 0–6)
EOSINOPHIL NFR BLD AUTO: 1 % (ref 0–6)
ERYTHROCYTE [DISTWIDTH] IN BLOOD BY AUTOMATED COUNT: 15.8 % (ref 11.6–15.1)
ERYTHROCYTE [DISTWIDTH] IN BLOOD BY AUTOMATED COUNT: 15.9 % (ref 11.6–15.1)
GFR SERPL CREATININE-BSD FRML MDRD: 30 ML/MIN/1.73SQ M
GFR SERPL CREATININE-BSD FRML MDRD: 30 ML/MIN/1.73SQ M
GLUCOSE SERPL-MCNC: 106 MG/DL (ref 65–140)
GLUCOSE SERPL-MCNC: 97 MG/DL (ref 65–140)
HCO3 BLDV-SCNC: 24.9 MMOL/L (ref 24–30)
HCT VFR BLD AUTO: 31.5 % (ref 34.8–46.1)
HCT VFR BLD AUTO: 32 % (ref 34.8–46.1)
HGB BLD-MCNC: 9.4 G/DL (ref 11.5–15.4)
HGB BLD-MCNC: 9.6 G/DL (ref 11.5–15.4)
IMM GRANULOCYTES # BLD AUTO: 0.07 THOUSAND/UL (ref 0–0.2)
IMM GRANULOCYTES # BLD AUTO: 0.12 THOUSAND/UL (ref 0–0.2)
IMM GRANULOCYTES NFR BLD AUTO: 1 % (ref 0–2)
IMM GRANULOCYTES NFR BLD AUTO: 1 % (ref 0–2)
INR PPP: 2.08 (ref 0.84–1.19)
LACTATE SERPL-SCNC: 1.1 MMOL/L (ref 0.5–2)
LYMPHOCYTES # BLD AUTO: 0.73 THOUSANDS/ΜL (ref 0.6–4.47)
LYMPHOCYTES # BLD AUTO: 0.75 THOUSANDS/ΜL (ref 0.6–4.47)
LYMPHOCYTES NFR BLD AUTO: 4 % (ref 14–44)
LYMPHOCYTES NFR BLD AUTO: 5 % (ref 14–44)
MAGNESIUM SERPL-MCNC: 2.5 MG/DL (ref 1.6–2.6)
MCH RBC QN AUTO: 29.9 PG (ref 26.8–34.3)
MCH RBC QN AUTO: 30.8 PG (ref 26.8–34.3)
MCHC RBC AUTO-ENTMCNC: 29.4 G/DL (ref 31.4–37.4)
MCHC RBC AUTO-ENTMCNC: 30.5 G/DL (ref 31.4–37.4)
MCV RBC AUTO: 101 FL (ref 82–98)
MCV RBC AUTO: 102 FL (ref 82–98)
MONOCYTES # BLD AUTO: 0.55 THOUSAND/ΜL (ref 0.17–1.22)
MONOCYTES # BLD AUTO: 0.56 THOUSAND/ΜL (ref 0.17–1.22)
MONOCYTES NFR BLD AUTO: 3 % (ref 4–12)
MONOCYTES NFR BLD AUTO: 4 % (ref 4–12)
MRSA NOSE QL CULT: NORMAL
NEUTROPHILS # BLD AUTO: 13.46 THOUSANDS/ΜL (ref 1.85–7.62)
NEUTROPHILS # BLD AUTO: 17.86 THOUSANDS/ΜL (ref 1.85–7.62)
NEUTS SEG NFR BLD AUTO: 89 % (ref 43–75)
NEUTS SEG NFR BLD AUTO: 92 % (ref 43–75)
NRBC BLD AUTO-RTO: 0 /100 WBCS
NRBC BLD AUTO-RTO: 0 /100 WBCS
O2 CT BLDV-SCNC: 14 ML/DL
PCO2 BLDV: 42.6 MM HG (ref 42–50)
PH BLDV: 7.38 [PH] (ref 7.3–7.4)
PHOSPHATE SERPL-MCNC: 3.2 MG/DL (ref 2.3–4.1)
PLATELET # BLD AUTO: 242 THOUSANDS/UL (ref 149–390)
PLATELET # BLD AUTO: 248 THOUSANDS/UL (ref 149–390)
PMV BLD AUTO: 10.1 FL (ref 8.9–12.7)
PMV BLD AUTO: 9.5 FL (ref 8.9–12.7)
PO2 BLDV: 81.4 MM HG (ref 35–45)
POTASSIUM SERPL-SCNC: 3.7 MMOL/L (ref 3.5–5.3)
POTASSIUM SERPL-SCNC: 3.8 MMOL/L (ref 3.5–5.3)
PROTHROMBIN TIME: 23.3 SECONDS (ref 11.6–14.5)
QRS AXIS: 76 DEGREES
QRSD INTERVAL: 100 MS
QT INTERVAL: 388 MS
QTC INTERVAL: 477 MS
RBC # BLD AUTO: 3.12 MILLION/UL (ref 3.81–5.12)
RBC # BLD AUTO: 3.14 MILLION/UL (ref 3.81–5.12)
SODIUM SERPL-SCNC: 139 MMOL/L (ref 136–145)
SODIUM SERPL-SCNC: 139 MMOL/L (ref 136–145)
T WAVE AXIS: 25 DEGREES
VENTRICULAR RATE: 91 BPM
WBC # BLD AUTO: 14.93 THOUSAND/UL (ref 4.31–10.16)
WBC # BLD AUTO: 19.36 THOUSAND/UL (ref 4.31–10.16)

## 2020-09-10 PROCEDURE — 94760 N-INVAS EAR/PLS OXIMETRY 1: CPT

## 2020-09-10 PROCEDURE — 80048 BASIC METABOLIC PNL TOTAL CA: CPT | Performed by: PHYSICIAN ASSISTANT

## 2020-09-10 PROCEDURE — 99223 1ST HOSP IP/OBS HIGH 75: CPT | Performed by: SURGERY

## 2020-09-10 PROCEDURE — 82805 BLOOD GASES W/O2 SATURATION: CPT | Performed by: SURGERY

## 2020-09-10 PROCEDURE — 97530 THERAPEUTIC ACTIVITIES: CPT

## 2020-09-10 PROCEDURE — 71045 X-RAY EXAM CHEST 1 VIEW: CPT

## 2020-09-10 PROCEDURE — 85025 COMPLETE CBC W/AUTO DIFF WBC: CPT | Performed by: SURGERY

## 2020-09-10 PROCEDURE — NC001 PR NO CHARGE: Performed by: SURGERY

## 2020-09-10 PROCEDURE — 97163 PT EVAL HIGH COMPLEX 45 MIN: CPT

## 2020-09-10 PROCEDURE — 93010 ELECTROCARDIOGRAM REPORT: CPT | Performed by: INTERNAL MEDICINE

## 2020-09-10 PROCEDURE — 97167 OT EVAL HIGH COMPLEX 60 MIN: CPT

## 2020-09-10 PROCEDURE — 99223 1ST HOSP IP/OBS HIGH 75: CPT | Performed by: INTERNAL MEDICINE

## 2020-09-10 PROCEDURE — 84100 ASSAY OF PHOSPHORUS: CPT | Performed by: PHYSICIAN ASSISTANT

## 2020-09-10 PROCEDURE — 83735 ASSAY OF MAGNESIUM: CPT | Performed by: PHYSICIAN ASSISTANT

## 2020-09-10 PROCEDURE — 85610 PROTHROMBIN TIME: CPT | Performed by: PHYSICIAN ASSISTANT

## 2020-09-10 PROCEDURE — 94640 AIRWAY INHALATION TREATMENT: CPT

## 2020-09-10 PROCEDURE — 83605 ASSAY OF LACTIC ACID: CPT | Performed by: SURGERY

## 2020-09-10 PROCEDURE — 85025 COMPLETE CBC W/AUTO DIFF WBC: CPT | Performed by: PHYSICIAN ASSISTANT

## 2020-09-10 PROCEDURE — 80048 BASIC METABOLIC PNL TOTAL CA: CPT | Performed by: SURGERY

## 2020-09-10 RX ORDER — AMOXICILLIN 250 MG
1 CAPSULE ORAL
Status: DISCONTINUED | OUTPATIENT
Start: 2020-09-10 | End: 2020-09-12 | Stop reason: HOSPADM

## 2020-09-10 RX ORDER — CEFAZOLIN SODIUM 1 G/50ML
1000 SOLUTION INTRAVENOUS EVERY 8 HOURS
Status: DISCONTINUED | OUTPATIENT
Start: 2020-09-10 | End: 2020-09-12 | Stop reason: HOSPADM

## 2020-09-10 RX ADMIN — LEVALBUTEROL HYDROCHLORIDE 1.25 MG: 1.25 SOLUTION, CONCENTRATE RESPIRATORY (INHALATION) at 21:00

## 2020-09-10 RX ADMIN — MICONAZOLE NITRATE 1 APPLICATION: 20 CREAM TOPICAL at 11:43

## 2020-09-10 RX ADMIN — MICONAZOLE NITRATE 1 APPLICATION: 20 CREAM TOPICAL at 18:36

## 2020-09-10 RX ADMIN — IPRATROPIUM BROMIDE 0.5 MG: 0.5 SOLUTION RESPIRATORY (INHALATION) at 11:13

## 2020-09-10 RX ADMIN — SODIUM CHLORIDE, SODIUM GLUCONATE, SODIUM ACETATE, POTASSIUM CHLORIDE AND MAGNESIUM CHLORIDE 75 ML/HR: 526; 502; 368; 37; 30 INJECTION, SOLUTION INTRAVENOUS at 08:42

## 2020-09-10 RX ADMIN — LEVALBUTEROL HYDROCHLORIDE 1.25 MG: 1.25 SOLUTION, CONCENTRATE RESPIRATORY (INHALATION) at 11:13

## 2020-09-10 RX ADMIN — HEPARIN SODIUM 7500 UNITS: 5000 INJECTION INTRAVENOUS; SUBCUTANEOUS at 05:36

## 2020-09-10 RX ADMIN — CEFAZOLIN SODIUM 1000 MG: 1 SOLUTION INTRAVENOUS at 21:23

## 2020-09-10 RX ADMIN — LEVOTHYROXINE SODIUM 88 MCG: 88 TABLET ORAL at 05:36

## 2020-09-10 RX ADMIN — DOCUSATE SODIUM AND SENNOSIDES 1 TABLET: 8.6; 5 TABLET ORAL at 11:43

## 2020-09-10 RX ADMIN — METOROPROLOL TARTRATE 5 MG: 5 INJECTION, SOLUTION INTRAVENOUS at 20:58

## 2020-09-10 RX ADMIN — HEPARIN SODIUM 7500 UNITS: 5000 INJECTION INTRAVENOUS; SUBCUTANEOUS at 22:56

## 2020-09-10 RX ADMIN — LEVALBUTEROL HYDROCHLORIDE 1.25 MG: 1.25 SOLUTION, CONCENTRATE RESPIRATORY (INHALATION) at 17:48

## 2020-09-10 RX ADMIN — CHLORHEXIDINE GLUCONATE 0.12% ORAL RINSE 15 ML: 1.2 LIQUID ORAL at 22:54

## 2020-09-10 RX ADMIN — HEPARIN SODIUM 7500 UNITS: 5000 INJECTION INTRAVENOUS; SUBCUTANEOUS at 15:19

## 2020-09-10 RX ADMIN — IPRATROPIUM BROMIDE 0.5 MG: 0.5 SOLUTION RESPIRATORY (INHALATION) at 17:48

## 2020-09-10 RX ADMIN — CHLORHEXIDINE GLUCONATE 0.12% ORAL RINSE 15 ML: 1.2 LIQUID ORAL at 08:40

## 2020-09-10 RX ADMIN — PANTOPRAZOLE SODIUM 40 MG: 40 TABLET, DELAYED RELEASE ORAL at 05:36

## 2020-09-10 RX ADMIN — ATORVASTATIN CALCIUM 10 MG: 10 TABLET, FILM COATED ORAL at 08:40

## 2020-09-10 RX ADMIN — IPRATROPIUM BROMIDE 0.5 MG: 0.5 SOLUTION RESPIRATORY (INHALATION) at 21:00

## 2020-09-10 RX ADMIN — METOROPROLOL TARTRATE 5 MG: 5 INJECTION, SOLUTION INTRAVENOUS at 11:43

## 2020-09-10 NOTE — PLAN OF CARE
Problem: OCCUPATIONAL THERAPY ADULT  Goal: Performs self-care activities at highest level of function for planned discharge setting  See evaluation for individualized goals  Description: Treatment Interventions: ADL retraining, Functional transfer training, UE strengthening/ROM, Endurance training, Patient/family training, Equipment evaluation/education, Energy conservation, Activityengagement, Compensatory technique education          See flowsheet documentation for full assessment, interventions and recommendations  Outcome: Progressing  Note: Limitation: Decreased ADL status, Decreased UE strength, Decreased endurance, Decreased high-level ADLs, Decreased self-care trans  Prognosis: Good  Assessment: Pt is a 63 y o  female admitted to [facility] on 9/9/2020 w/ SOB  Comorbidities include a h/o Anemia of Chronic Renal Failure Stage 3, CKD III, Hypertensive Chronic Kidney Disease, Persistent Proteinuria, Iron Deficiency, Dyslipidemia, Hyperparathyroidism, multiple wounds  Pt with active OT orders and up with assistance  orders  Pt resides in a 2nd floor handicap accessible apartment with no SOFI and no steps inside with her significant other who is also her caretaker 24/7  Pt's full bathroom equipped with walk-in shower with shower chair, GBs and [address] [place] [address] and a raised toilet with GBs  Pt needed assistance for bathing, LB ADLs and perihygiene at baseline and utilized an electric WC PTA  Pt was receiving home PT 1x/wk for mobility training with RW  Pt reports having a RW, electric WC, hospital bed and LB dressing equipment at home  Pt stated her caretaker assists with IADLs and ADLs  Pt takes the bus for transportation and is n 3L of O2 at all times  Currently pt requires SUP for grooming, Min/Mod A UB ADLs, Max A LB ADLs and Max A toileting  Pt is limited at this time 2*: pain, endurance, activity tolerance, functional mobility, balance, functional standing tolerance, decreased I w/ ADLS/IADLS and strength  The following Occupational Performance Areas to address include: bathing/shower, toilet hygiene, dressing, functional mobility and clothing management  Pt scored overall  55/100 on the Barthel Index  Based on the aforementioned OT evaluation, functional performance deficits, and assessments, pt has been identified as a high complexity evaluation  From OT standpoint, anticipate d/c home with  care from her caretaker and home therapy  Pt to continue to benefit from acute immediate OT services to address the following goals 3-5x/week to  w/in 10-14 days:        OT Discharge Recommendation: Home with skilled therapy(and caretaker )  OT - OK to Discharge: Yes(to home once medically cleared )

## 2020-09-10 NOTE — PLAN OF CARE
Problem: Prexisting or High Potential for Compromised Skin Integrity  Goal: Skin integrity is maintained or improved  Description: INTERVENTIONS:  - Identify patients at risk for skin breakdown  - Assess and monitor skin integrity  - Assess and monitor nutrition and hydration status  - Monitor labs   - Assess for incontinence   - Turn and reposition patient  - Assist with mobility/ambulation  - Relieve pressure over bony prominences  - Avoid friction and shearing  - Provide appropriate hygiene as needed including keeping skin clean and dry  - Evaluate need for skin moisturizer/barrier cream  - Collaborate with interdisciplinary team   - Patient/family teaching  - Consider wound care consult   Outcome: Progressing     Problem: Nutrition/Hydration-ADULT  Goal: Nutrient/Hydration intake appropriate for improving, restoring or maintaining nutritional needs  Description: Monitor and assess patient's nutrition/hydration status for malnutrition  Collaborate with interdisciplinary team and initiate plan and interventions as ordered  Monitor patient's weight and dietary intake as ordered or per policy  Utilize nutrition screening tool and intervene as necessary  Determine patient's food preferences and provide high-protein, high-caloric foods as appropriate       INTERVENTIONS:  - Monitor oral intake, urinary output, labs, and treatment plans  - Assess nutrition and hydration status and recommend course of action  - Evaluate amount of meals eaten  - Assist patient with eating if necessary   - Allow adequate time for meals  - Recommend/ encourage appropriate diets, oral nutritional supplements, and vitamin/mineral supplements  - Order, calculate, and assess calorie counts as needed  - Recommend, monitor, and adjust tube feedings and TPN/PPN based on assessed needs  - Assess need for intravenous fluids  - Provide specific nutrition/hydration education as appropriate  - Include patient/family/caregiver in decisions related to nutrition  Outcome: Progressing     Problem: CARDIOVASCULAR - ADULT  Goal: Maintains optimal cardiac output and hemodynamic stability  Description: INTERVENTIONS:  - Monitor I/O, vital signs and rhythm  - Monitor for S/S and trends of decreased cardiac output  - Administer and titrate ordered vasoactive medications to optimize hemodynamic stability  - Assess quality of pulses, skin color and temperature  - Assess for signs of decreased coronary artery perfusion  - Instruct patient to report change in severity of symptoms  Outcome: Progressing  Goal: Absence of cardiac dysrhythmias or at baseline rhythm  Description: INTERVENTIONS:  - Continuous cardiac monitoring, vital signs, obtain 12 lead EKG if ordered  - Administer antiarrhythmic and heart rate control medications as ordered  - Monitor electrolytes and administer replacement therapy as ordered  Outcome: Progressing     Problem: RESPIRATORY - ADULT  Goal: Achieves optimal ventilation and oxygenation  Description: INTERVENTIONS:  - Assess for changes in respiratory status  - Assess for changes in mentation and behavior  - Position to facilitate oxygenation and minimize respiratory effort  - Oxygen administered by appropriate delivery if ordered  - Initiate smoking cessation education as indicated  - Encourage broncho-pulmonary hygiene including cough, deep breathe, Incentive Spirometry  - Assess the need for suctioning and aspirate as needed  - Assess and instruct to report SOB or any respiratory difficulty  - Respiratory Therapy support as indicated  Outcome: Progressing     Problem: METABOLIC, FLUID AND ELECTROLYTES - ADULT  Goal: Electrolytes maintained within normal limits  Description: INTERVENTIONS:  - Monitor labs and assess patient for signs and symptoms of electrolyte imbalances  - Administer electrolyte replacement as ordered  - Monitor response to electrolyte replacements, including repeat lab results as appropriate  - Instruct patient on fluid and nutrition as appropriate  Outcome: Progressing  Goal: Fluid balance maintained  Description: INTERVENTIONS:  - Monitor labs   - Monitor I/O and WT  - Instruct patient on fluid and nutrition as appropriate  - Assess for signs & symptoms of volume excess or deficit  Outcome: Progressing  Goal: Glucose maintained within target range  Description: INTERVENTIONS:  - Monitor Blood Glucose as ordered  - Assess for signs and symptoms of hyperglycemia and hypoglycemia  - Administer ordered medications to maintain glucose within target range  - Assess nutritional intake and initiate nutrition service referral as needed  Outcome: Progressing     Problem: SKIN/TISSUE INTEGRITY - ADULT  Goal: Skin integrity remains intact  Description: INTERVENTIONS  - Identify patients at risk for skin breakdown  - Assess and monitor skin integrity  - Assess and monitor nutrition and hydration status  - Monitor labs (i e  albumin)  - Assess for incontinence   - Turn and reposition patient  - Assist with mobility/ambulation  - Relieve pressure over bony prominences  - Avoid friction and shearing  - Provide appropriate hygiene as needed including keeping skin clean and dry  - Evaluate need for skin moisturizer/barrier cream  - Collaborate with interdisciplinary team (i e  Nutrition, Rehabilitation, etc )   - Patient/family teaching  Outcome: Progressing  Goal: Incision(s), wounds(s) or drain site(s) healing without S/S of infection  Description: INTERVENTIONS  - Assess and document risk factors for skin impairment   - Assess and document dressing, incision, wound bed, drain sites and surrounding tissue  - Consider nutrition services referral as needed  - Oral mucous membranes remain intact  - Provide patient/ family education  Outcome: Progressing  Goal: Oral mucous membranes remain intact  Description: INTERVENTIONS  - Assess oral mucosa and hygiene practices  - Implement preventative oral hygiene regimen  - Implement oral medicated treatments as ordered  - Initiate Nutrition services referral as needed  Outcome: Progressing     Problem: PAIN - ADULT  Goal: Verbalizes/displays adequate comfort level or baseline comfort level  Description: Interventions:  - Encourage patient to monitor pain and request assistance  - Assess pain using appropriate pain scale  - Administer analgesics based on type and severity of pain and evaluate response  - Implement non-pharmacological measures as appropriate and evaluate response  - Consider cultural and social influences on pain and pain management  - Notify physician/advanced practitioner if interventions unsuccessful or patient reports new pain  Outcome: Progressing     Problem: INFECTION - ADULT  Goal: Absence or prevention of progression during hospitalization  Description: INTERVENTIONS:  - Assess and monitor for signs and symptoms of infection  - Monitor lab/diagnostic results  - Monitor all insertion sites, i e  indwelling lines, tubes, and drains  - Monitor endotracheal if appropriate and nasal secretions for changes in amount and color  - [place] appropriate cooling/warming therapies per order  - Administer medications as ordered  - Instruct and encourage patient and family to use good hand hygiene technique  - Identify and instruct in appropriate isolation precautions for identified infection/condition  Outcome: Progressing  Goal: Absence of fever/infection during neutropenic period  Description: INTERVENTIONS:  - Monitor WBC    Outcome: Progressing     Problem: SAFETY ADULT  Goal: Patient will remain free of falls  Description: INTERVENTIONS:  - Assess patient frequently for physical needs  -  Identify cognitive and physical deficits and behaviors that affect risk of falls    -  [place] fall precautions as indicated by assessment   - Educate patient/family on patient safety including physical limitations  - Instruct patient to call for assistance with activity based on assessment  - Modify environment to reduce risk of injury  - Consider OT/PT consult to assist with strengthening/mobility  Outcome: Progressing  Goal: Maintain or return to baseline ADL function  Description: INTERVENTIONS:  -  Assess patient's ability to carry out ADLs; assess patient's baseline for ADL function and identify physical deficits which impact ability to perform ADLs (bathing, care of mouth/teeth, toileting, grooming, dressing, etc )  - Assess/evaluate cause of self-care deficits   - Assess range of motion  - Assess patient's mobility; develop plan if impaired  - Assess patient's need for assistive devices and provide as appropriate  - Encourage maximum independence but intervene and supervise when necessary  - Involve family in performance of ADLs  - Assess for home care needs following discharge   - Consider OT consult to assist with ADL evaluation and planning for discharge  - Provide patient education as appropriate  Outcome: Progressing  Goal: Maintain or return mobility status to optimal level  Description: INTERVENTIONS:  - Assess patient's baseline mobility status (ambulation, transfers, stairs, etc )    - Identify cognitive and physical deficits and behaviors that affect mobility  - Identify mobility aids required to assist with transfers and/or ambulation (gait belt, sit-to-stand, lift, walker, cane, etc )  - [place] fall precautions as indicated by assessment  - Record patient progress and toleration of activity level on Mobility SBAR; progress patient to next Phase/Stage  - Instruct patient to call for assistance with activity based on assessment  - Consider rehabilitation consult to assist with strengthening/weightbearing, etc   Outcome: Progressing

## 2020-09-10 NOTE — OCCUPATIONAL THERAPY NOTE
Occupational Therapy Evaluation and Treatment Note     Patient Name: Scott Jean  [id number] Date: 9/10/2020  Problem List  Principal Problem:    SOB (shortness of breath)    Past Medical History  Past Medical History:   Diagnosis Date    Anemia     Arthritis     [facility] [address] )     Chronic kidney disease     Diabetes mellitus ([facility] [address] )     Disease of thyroid gland     HLD (hyperlipidemia)     Hypertension     Ovarian cancer [facility])      Past Surgical History  Past Surgical History:   Procedure Laterality Date    GALLBLADDER SURGERY  05/01/2004    HYSTERECTOMY  06/01/2018    NEPHRECTOMY Right 08/02/2018           09/10/20 1001   OT Last Visit   OT Visit Date 09/10/20   Note Type   Note type Eval/Treat   Restrictions/Precautions   Weight Bearing Precautions Per Order No   Other Precautions Multiple lines;O2;Telemetry; Fall Risk;Pain  (Wounds)   Pain Assessment   Pain Assessment Tool 0-10   Pain Score No Pain   Home Living   Type of Home Apartment   Home Layout One level;Performs ADLs on one level; Access  (2nd floor apartment, no SOFI)   Bathroom Shower/Tub Walk-in shower   Bathroom Toilet Raised   Bathroom Equipment Grab bars in shower;Built-in shower seat; Shower chair;Hand-held shower; Toilet raiser;Grab bars around toilet   Bathroom Accessibility Accessible via wheelchair;Accessible via walker   [address] [facility]; Wheelchair-electric; Sock aid;Reacher;Hospital bed;Grab bars   Additional Comments Patient lives in a handicap accessible apartment   Prior Function   Level of [place]quin[place] Needs assistance with ADLs and functional mobility; Needs assistance with IADLs   Lives With Significant other  (SO is also her full time caretaker)   Receives Help From Other (Comment)  (Significant other)   ADL Assistance Needs assistance   IADLs Needs assistance   Subjective   Subjective "I'm feeling more myself today"   ADL   Eating Assistance 6  Modified independent   Grooming Assistance 5  Supervision/Setup Grooming Deficit Setup   UB Bathing Assistance 4  Minimal Assistance   UB Bathing Deficit Setup; Increased time to complete   LB Bathing Assistance 2  Maximal Assistance   UB Dressing Assistance 3  Moderate Assistance   UB Dressing Deficit Thread RUE; Thread LUE  (Multiple lines)   LB Dressing Assistance 2  Maximal Assistance   Toileting Assistance  2  Maximal Assistance   Toileting Deficit Use of bedpan/urinal setup   Bed Mobility   Rolling R 5  Supervision   Additional items Bedrails   Rolling L 5  Supervision   Additional items Bedrails   Supine to Sit 5  Supervision   Additional items Increased time required; Bedrails;HOB elevated   Transfers   Sit to Stand 4  Minimal assistance   Additional items Assist x 1; Increased time required   Stand to Sit 4  Minimal assistance   Additional items Assist x 1; Increased time required   Stand pivot 4  Minimal assistance   Additional items Assist x 1   Functional Mobility   Functional Mobility 4  Minimal assistance  (SPT to chair with 1 side step Min A)   Additional Comments no AD   Balance   Static Sitting Good   Dynamic Sitting Fair +   Static Standing Fair   Dynamic Standing Fair -   Activity Tolerance   Activity Tolerance Patient tolerated treatment well   Medical Staff Made Aware PT Gill Winston   Nurse Made Aware RN gave OK to see patient   RUE Assessment   RUE Assessment X   RUE Overall AROM   R Shoulder Flexion 100*   R Shoulder ABduction 100*   LUE Assessment   LUE Assessment X   LUE Overall AROM   L Shoulder Flexion 100*   L Shoulder ABduction 100*   Hand Function   Gross Motor Coordination Impaired  (Impaired OH AROM at baseline)   Fine Motor Coordination Functional   Sensation   Light Touch No apparent deficits   Sharp/Dull No apparent deficits   Stereognosis No apparent deficits   Cognition   Overall Cognitive Status WFL   Arousal/Participation Alert; Responsive; Cooperative   Attention Within functional limits   Orientation Level Oriented X4   Memory Within functional limits   Following Commands Follows all commands and directions without difficulty   Assessment   Limitation Decreased ADL status; Decreased UE strength;Decreased endurance;Decreased high-level ADLs; Decreased self-care trans   Prognosis Good   Assessment Pt is a 63 y o  female admitted to [facility] on 9/9/2020 w/ SOB  Comorbidities include a h/o Anemia of Chronic Renal Failure Stage 3, CKD III, Hypertensive Chronic Kidney Disease, Persistent Proteinuria, Iron Deficiency, Dyslipidemia, Hyperparathyroidism, multiple wounds  Pt with active OT orders and up with assistance  orders  Pt resides in a 2nd floor handicap accessible apartment with no SOFI and no steps inside with her significant other who is also her caretaker 24/7  Pt's full bathroom equipped with walk-in shower with shower chair, GBs and [address] and a raised toilet with GBs  Pt needed assistance for bathing, LB ADLs and perihygiene at baseline and utilized an electric WC PTA  Pt was receiving home PT 1x/wk for mobility training with RW  Pt reports having a RW, electric WC, hospital bed and LB dressing equipment at home  Pt stated her caretaker assists with IADLs and ADLs  Pt takes the bus for transportation and is n 3L of O2 at all times  Currently pt requires SUP for grooming, Min/Mod A UB ADLs, Max A LB ADLs and Max A toileting  Pt is limited at this time 2*: pain, endurance, activity tolerance, functional mobility, balance, functional standing tolerance, decreased I w/ ADLS/IADLS and strength  The following Occupational Performance Areas to address include: bathing/shower, toilet hygiene, dressing, functional mobility and clothing management  Pt scored overall  55/100 on the Barthel Index  Based on the aforementioned OT evaluation, functional performance deficits, and assessments, pt has been identified as a high complexity evaluation  From OT standpoint, anticipate d/c home with 24/7 care from her caretaker and home therapy   Pt to continue to benefit from acute immediate OT services to address the following goals 3-5x/week to  w/in 10-14 days: Lajaimean Cassette Goals   Patient Goals to go back home with her SO/caretaker   Plan   Treatment Interventions ADL retraining;Functional transfer training;UE strengthening/ROM; Endurance training;Patient/family training;Equipment evaluation/education; Energy conservation; Activityengagement; Compensatory technique education   Goal Expiration Date 20   OT Treatment Day 1   OT Frequency 3-5x/wk   Additional Treatment Session   Start Time 09   End Time 1001   Treatment Assessment Treatment focused on bed mobility, ADL transfers, standing balance and tolerance and activity tolerance  Pt remained on 3L of O2 via NC throughout session and maintained O2 level above 95% for entirety of session with no SOB noted  Pt completed bed mobility of rolling L and R with SUP with use of bed rails  Supine to sit completed with SUP with use of bed rails and increased time  Pt sat EOB with G static sitting balance  Pt completed SPT to recliner towards right side with Min A x1 for line management and STS  Pt took 1-2 side steps towards recliner with Min A and no AD  Pt left seated in recliner with all needs met and call bell within reach at end of tx session  Pt would benefit from continued skilled OT services while in the hospital in order to maximize independence with self care tasks and functional mobility in order for patient to safely discharge to the next level of care  Proceed with POC      Recommendation   OT Discharge Recommendation Home with skilled therapy  (and caretaker )   OT - OK to Discharge Yes  (to home once medically cleared )   Barthel Index   Feeding 10   Bathing 0   Grooming Score 5   Dressing Score 5   Bladder Score 10   Bowels Score 10   Toilet Use Score 5   Transfers (Bed/Chair) Score 10   Mobility (Level Surface) Score 0   Stairs Score 0   Barthel Index Score 55   Modified Reji Scale   Modified Taliaferro Scale 4     GOALS    1) Pt will increase activity tolerance to G for 30 min txment sessions    2) Pt will complete UB bathing and dressing w/ SETUP/SUP using adaptive device and DME as needed    3) Pt will complete toileting w/ SUP w/ assistance for posterior hygiene/thoroughness as needed    4) Pt will improve functional transfers to SUP on/off all surfaces using DME as needed w/ G balance/safety     5) Pt will improve functional mobility during ADL/IADL/leisure tasks to SUP with WC follow using DME as needed w/ G balance/safety     6) Pt will demonstrate G carryover of pt/caregiver education and training as appropriate  7) Pt will demonstrate 100% carryover of energy conservation techniques t/o functional I/ADL/leisure tasks w/o cues s/p skilled education       Mone Carver MOT,OTR/L

## 2020-09-10 NOTE — CONSULTS
Consultation - Nephrology   Bea Metz 63 y o  female MRN: [id number]  Unit/Bed#: -01 Encounter: [id number]    ASSESSMENT/PLAN:   1  CKD stage 3:  Baseline creatinine seems to be 1 2-1 7 over the past few years  Admitted with creatinine of 1 9 which is stable at 1 8 today  · Suspect CKD is related to hypertension, morbid obesity, history of nephrectomy and possible diabetic nephropathy (reports diabetic meds stopped about 1 year ago)   · Previously saw Dr Jan Clark in our office but last seen in Oct 2018  · UA:  Trace protein, leukocytes, 4-10 RBCs, 4-10 wbc's and moderate bacteria  · Agree with holding Lasix and giving gentle IV fluids for now in the setting of hypotension/sepsis and she appears euvolemic  · Check a m  BMP  2  Sepsis:  Felt to be related to abdominal wall cellulitis and possible pneumonia  · Status post vancomycin and cefepime in the ED  · Currently on ceftriaxone  3  Hypertension:  Mom currently with hypotension  · On metoprolol IV for AFib  · Continue Plasmalyte at 50 cc/hour  · Home Lasix on hold  4  AFib with RVR:  Status post Cardizem drip  5  Anemia:  Hemoglobin is slowly trending down since admission, possibly related IV fluids  6  Solitary kidney status post right nephrectomy due to renal cell carcinoma  7  COPD:  On home oxygen 3 L nasal cannula    HISTORY OF PRESENT ILLNESS:  Requesting Physician: Shawn Mckinley DO  Reason for Consult:  CKD    Bea Metz is a 63y o  year old female who originally presented to [facility] Emergency room yesterday complaining of a panic attack  In the emergency room she was found to be in AFib with RVR and nauseous showed signs of sepsis  She was started on Cardizem drip and received cefepime and vancomycin  CT of chest was concerning for high-grade small-bowel obstruction as well as possible pneumonia so she was transferred to [place] for surgical evaluation    She has a history of CKD so Nephrology was consulted  Patient denies seeing nephrologist in the past (although on review of records she saw Dr Don Rosales in our office) but did have a right nephrectomy about 3 years ago due to renal cell carcinoma  Overall she had been feeling okay until yesterday when she states that she started gagging and then became very short of breath and nauseous and could not calm herself down which is why she felt she was having a panic attack and came to the hospital   She is feeling much better today  She does have chronic shortness of breath and is on 3 L nasal cannula at home  She denies any recent vomiting or diarrhea  She has had some constipation recently  Overall eating and drinking okay  PAST MEDICAL HISTORY:  Past Medical History:   Diagnosis Date    Anemia     Arthritis     [facility] [address] [facility]     Chronic kidney disease     Diabetes mellitus [facility] [address] [facility]     Disease of thyroid gland     HLD (hyperlipidemia)     Hypertension     [facility]        PAST SURGICAL HISTORY:  Past Surgical History:   Procedure Laterality Date    GALLBLADDER SURGERY  05/01/2004    HYSTERECTOMY  06/01/2018    NEPHRECTOMY Right 08/02/2018       ALLERGIES:  No Known Allergies    SOCIAL HISTORY:  Social History     Substance and Sexual Activity   Alcohol Use No    Comment: n/a     Social History     Substance and Sexual Activity   Drug Use Yes    Types: Marijuana    Comment: smokes with girlfriend when anxious     Social History     Tobacco Use   Smoking Status Former Smoker    Packs/day: 2 00    Years: 30 00    Pack years: 60 00   Smokeless Tobacco Former User    Quit date: 9/1/2011   Tobacco Comment    quit approx   9 years ago       FAMILY HISTORY:  Family History   Problem Relation Age of Onset    No Known Problems Mother     No Known Problems Father        MEDICATIONS:  Scheduled Meds:  Current Facility-Administered Medications   Medication Dose Route Frequency Provider Last Rate    acetaminophen  975 mg Oral Q6H PRN Jayne Quispe MD      albuterol  2 puff Inhalation Q4H PRN Mariel Hendrickson PA-C      albuterol  2 5 mg Nebulization Q4H PRN Mariel Hendrickson PA-C      atorvastatin  10 mg Oral Daily Mariel Hendrickson PA-C      cefTRIAXone  1,000 mg Intravenous Q24H CLIF GuallpaC 1,000 mg (09/09/20 1804)    chlorhexidine  15 mL [facility] Mariel Hendrickson PA-C      fluticasone-vilanterol  1 puff Inhalation Daily Mariel Hendrickson PA-C      heparin (porcine)  7,500 Units Subcutaneous [facility] Mariel Hendrickson PA-C      ipratropium  0 5 mg Nebulization TID Mariel Hendrickson PA-C      levalbuterol  1 25 mg Nebulization TID Mariel Hendrickson PA-C      levothyroxine  88 mcg Oral Early Morning Mariel Hendrickson PA-C      metoprolol  5 mg Intravenous Q6H Mariel Hendrickson PA-C      EARLINE ANTIFUNGAL   Topical BID Wilmer Case DO      multi-electrolyte  50 mL/hr Intravenous Continuous Karene Blizzard, MD 50 mL/hr (09/10/20 0849)   Gabriella Azevedo pantoprazole  40 mg Oral Early Morning Mariel Hendrickson PA-C         PRN Meds:   acetaminophen    albuterol    albuterol    Continuous Infusions:multi-electrolyte, 50 mL/hr, Last Rate: 50 mL/hr (09/10/20 0849)        REVIEW OF SYSTEMS:  A complete review of systems was done  Pertinent positives and negatives noted in the HPI but otherwise the review of systems is negative      PHYSICAL EXAM:  Current Weight: Weight - Scale: 132 kg (291 lb 7 2 oz)  First Weight: Weight - Scale: 132 kg (290 lb 5 5 oz)  Vitals:    09/10/20 0648   BP: 107/60   Pulse: (!) 107   Resp: 18   Temp: 97 6 °F (36 4 °C)   SpO2: 99%       Intake/Output Summary (Last 24 hours) at 9/10/2020 1007  Last data filed at 9/10/2020 0840  Gross per 24 hour   Intake 1883 33 ml   Output 700 ml   Net 1183 33 ml     General:  No acute distress, morbidly obese  Skin:  No rash  Eyes:  Sclerae anicteric  ENT:  Moist mucous membranes  Neck:  Trachea midline with no JVD  Chest:  Clear to auscultation bilaterally  CVS:  Regular rate and rhythm  Abdomen:  Soft, nontender, nondistended  Extremities:  No edema  Neuro:  Awake and alert  Psych:  Appropriate affect    Lab Results:   Results from last 7 days   Lab Units 09/10/20  0542 09/10/20  0006 09/09/20  1318 09/09/20  0534 09/09/20  0524   WBC Thousand/uL 14 93* 19 36* 25 32*  --  14 69*   HEMOGLOBIN g/dL 9 4* 9 6* 10 5*  --  11 7   I STAT HEMOGLOBIN g/dl  --   --   --  11 9  --    HEMATOCRIT % 32 0* 31 5* 35 1  --  38 2   HEMATOCRIT, ISTAT %  --   --   --  35  --    PLATELETS Thousands/uL 242 248 284  --  330   SODIUM mmol/L 139 139 141  --  139   POTASSIUM mmol/L 3 8 3 7 4 0  --  4 4   CHLORIDE mmol/L 109* 106 109*  --  99   CO2 mmol/L 25 28 29  --  31   CO2, I-STAT mmol/L  --   --   --  28  --    BUN mg/dL 24 23 24  --  25*   CREATININE mg/dL 1 81* 1 78* 1 81*  --  1 90*   CALCIUM mg/dL 8 4 8 7 8 4  --  9 4   MAGNESIUM mg/dL 2 5  --   --   --  1 5*   PHOSPHORUS mg/dL 3 2  --   --   --   --    GLUCOSE, ISTAT mg/dl  --   --   --  172*  --        Radiology Results:   XR chest portable    (Results Pending)

## 2020-09-10 NOTE — PLAN OF CARE
Problem: PHYSICAL THERAPY ADULT  Goal: Performs mobility at highest level of function for planned discharge setting  See evaluation for individualized goals  Description: Treatment/Interventions: Functional transfer training, LE strengthening/ROM, Therapeutic exercise, Endurance training, Bed mobility, Equipment eval/education, Patient/family training, Gait training          See flowsheet documentation for full assessment, interventions and recommendations  Note: Prognosis: Good  Problem List: Decreased strength, Decreased endurance, Impaired balance, Decreased mobility  Assessment: Pt seen for high complexity physical therapy evaluation  Pt is a 62 y/o female w/ history/comorbidities of DM, a-fib, COPD, CKD, HTN, HLD, obesity, renal CA w/ nephrectomy who is now admitted as a transfer from [place] w/ SOB, fatigue, nausea  Noted to be in rapid a-fib on admit, also w/ SBO and severe sepsis  Due to acute medical issues, need for transfer fro increased level of care, pain, fall risk, note unstable clinical picture  PT consulted to assess mobility, d/c needs  Pt presents w/ decreased functional mob, standing balance, endurance, B LE strength, barriers at home  will benefit from skilled PT to correct for the above problems  Recommend home w  her 24 hr care, home PT        PT Discharge Recommendation: (S) Return to previous environment with social support(caregiver, home PT)     PT - OK to Discharge: (S) Yes(when stable, see above for recs)    See flowsheet documentation for full assessment

## 2020-09-10 NOTE — QUICK NOTE
Patient seen at bedside due to fever of 101 7 and blood pressures 80s-90s/50s  Patient mentating well, asymptomatic, UOP seems decreased but unclear due to being on purewick  EKG showed A-fib with rate of 91  Lactate and base deficit normal  Will observe and defer giving extra fluids due to possible history of CHF (patient states that she was hospitalized for CHF once, takes lasix, and does not tolerate sleeping flat)   Changed vitals to Q4 hr

## 2020-09-10 NOTE — PROGRESS NOTES
Progress Note - General Surgery   Helena Denver 63 y o  female MRN: [id number]  Unit/Bed#: -01 Encounter: [id number]    Assessment:  60F w/ known ventral hernia, now with CT evidence for partial small bowel obstruction however asymptomatic, tolerating diet and passing flatus  - Pneumonia POA  - Cellulitis of abdominal wall/pannus, POA    Plan:  Advance diet as tolerated  Taper fluids as tolerating diet    Monitor abdominal wall cellulitis  Continue Abx - Rocephin  OOB/Ambulate  DVT PPx      Subjective/Objective   Chief Complaint:     Subjective: Patient did have some hypotension overnight however she recovered without issues  Passing flatus, tolerating clears  Objective:     Blood pressure 107/60, pulse (!) 107, temperature 97 6 °F (36 4 °C), temperature source Oral, resp  rate 18, height 5' 4" (1 626 m), weight 132 kg (291 lb 7 2 oz), SpO2 99 %, not currently breastfeeding  ,Body mass index is 50 03 kg/m²  Intake/Output Summary (Last 24 hours) at 9/10/2020 0840  Last data filed at 9/10/2020 0830  Gross per 24 hour   Intake 804 58 ml   Output 700 ml   Net 104 58 ml       Invasive Devices     Peripheral Intravenous Line            Peripheral IV 09/09/20 Right;Upper Arm 1 day                Physical Exam: General: AAOx3  Head: normocephalic, atraumatic  Neck: supple, trachea midline   Respiratory: BS b/l  Abdomen: Soft, Non tender, abdominal wall cellulitis with blanching erythema, outlined  Heart: RRR, S1s2  Ext: Warm no cyanosis   Pulse: 2+ radial      Lab, Imaging and other studies:  I have personally reviewed pertinent lab results    , CBC:   Lab Results   Component Value Date    WBC 14 93 (H) 09/10/2020    HGB 9 4 (L) 09/10/2020    HCT 32 0 (L) 09/10/2020     (H) 09/10/2020     09/10/2020    MCH 29 9 09/10/2020    MCHC 29 4 (L) 09/10/2020    RDW 15 9 (H) 09/10/2020    MPV 10 1 09/10/2020    NRBC 0 09/10/2020   , CMP:   Lab Results   Component Value Date    SODIUM 139 09/10/2020 K 3 8 09/10/2020     (H) 09/10/2020    CO2 25 09/10/2020    BUN 24 09/10/2020    CREATININE 1 81 (H) 09/10/2020    CALCIUM 8 4 09/10/2020    EGFR 30 09/10/2020     VTE Pharmacologic Prophylaxis: Heparin  VTE Mechanical Prophylaxis: sequential compression device

## 2020-09-10 NOTE — CONSULTS
Consultation - Infectious Disease   Soledad Siemens 63 y o  female MRN: [id number]  Unit/Bed#: -01 Encounter: [id number]      Inpatient consult to Infectious Diseases  Consult performed by: Melody Saul MD  Consult ordered by: Leann Heller MD          IMPRESSION & RECOMMENDATIONS:   Impression:  1  Abdominal wall cellulitis with areas of Candida panniculitis  2  Morbid obesity  3  DM type 2 with RADHA on CKD  4  History of COPD  5  AF  6  History of ovarian and renal carcinoma     Recommendations:    Discuss with the primary service  1  At present has no evidence of sepsis or pneumonia  Check final blood culture results  2  We will switch ceftriaxone to cefazolin 1 g q 8 hours IV and hopefully within 24 hours will be able to switch to p o  Cephalexin to complete a 5-7 day course of additional therapy if blood cultures remain negative  3  Agree with continuing miconazole cream moisture barrier as per wound care       HISTORY OF PRESENT ILLNESS:    Reason for Consult: Body wall cellulitis versus candidiasis with pneumonia  HPI: Soledad Siemens is a 63y o  year old female with morbid obesity who was seen at the [facility] ER yesterday complaining of shortness of breath with some nausea and vomiting  Patient also had rigors type chills  Patient has a past history of ovarian and renal carcinoma COPD, DM type 2, and AF  Patient met sepsis criteria and was transferred here to the ER for admission and further treatment  Patient was begun on vancomycin and cefepime IV after cultures were obtained  A CT scan of the abdomen and pelvis revealed a high-grade small-bowel obstruction with a large right ventral wall hernia as well as a LLL infiltrate that suggested atelectasis versus pneumonia  Her SARS-CoV-2 PCR was negative  Patient was passing gas and having bowel movements and surgery was not felt to be necessary  The cefepime was changed to ceftriaxone 1 g Q 24 hours IV yesterday  She also received 1 dose of vancomycin IV  A chest x-ray repeated today showed no definite pneumonia  Blood cultures x2 are negative at 24 hours  WBC count is 14,930 without a significant left shift, creatinine is elevated at 1 78 with a GFR of 30 mL per minute  Procalcitonin was normal at 0 06  Urine nitrate was negative with positive leukocytes      Review of Systems chills, fever, shortness of breath, weakness, nausea, 1 episode of vomiting  A [id number] point system-based review of systems is otherwise negative  PAST MEDICAL HISTORY:  Past Medical History:   Diagnosis Date    Anemia     Arthritis     [facility]     Chronic kidney disease     [facility]     Disease of thyroid gland     HLD (hyperlipidemia)     Hypertension     [facility])      Past Surgical History:   Procedure Laterality Date    GALLBLADDER SURGERY  2004    HYSTERECTOMY  2018    NEPHRECTOMY Right 2018       FAMILY HISTORY:  Non-contributory    SOCIAL HISTORY:  Social History   Single  Social History     Substance and Sexual Activity   Alcohol Use Never    Frequency: Never    Comment: n/a     Social History     Substance and Sexual Activity   Drug Use Yes    Types: Marijuana    Comment: smokes with girlfriend when anxious     Social History     Tobacco Use   Smoking Status Former Smoker    Packs/day: 2 00    Years: 30 00    Pack years: 60 00   Smokeless Tobacco Former User    Quit date: 2011   Tobacco Comment    quit approx  9 years ago       ALLERGIES:  No Known Allergies    MEDICATIONS:  All current active medications have been reviewed        PHYSICAL EXAM:  Temp:  [97 5 °F (36 4 °C)-101 7 °F (38 7 °C)] 97 6 °F (36 4 °C)  HR:  [] 110  Resp:  [18-22] 20  BP: ()/(51-65) 120/65  SpO2:  [94 %-99 %] 99 %  Temp (24hrs), Av 8 °F (37 1 °C), Min:97 5 °F (36 4 °C), Max:101 7 °F (38 7 °C)  Current: Temperature: 97 6 °F (36 4 °C)    Intake/Output Summary (Last 24 hours) at 9/10/2020 1650  Last data filed at 9/10/2020 1307  Gross per 24 hour   Intake 2264 91 ml   Output 850 ml   Net 1414 91 ml       General Appearance:  Appearing Alert, morbidly obese, nontoxic, and in no distress, appears stated age   Head:  Normocephalic, without obvious abnormality, atraumatic   Eyes:  PERRL, conjunctiva pale and sclera anicteric, both eyes   Nose: Nares normal, mucosa normal, no drainage   Throat: Upper denture with numerous missing teeth lower jaw   Neck: Supple, symmetrical, trachea midline, no adenopathy, no tenderness/mass/nodules   Back:   Symmetric, no curvature, ROM normal, no CVA tenderness   Lungs:   Clear to auscultation bilaterally, respiratory expansion is decreased no audible wheezes, rhonchi and rales, respirations unlabored   Chest Wall:  No tenderness or deformity   Heart:  Irregular, irregular rate and rhythm, S1, S2 normal, no murmur, rub or gallop   Abdomen:   Soft, non-tender, non-distended, protuberant lower abdomen panniculus area has 1+ erythema and increased warmth, underside has areas of Candida dermatitis,, positive bowel sounds, no masses, no organomegaly    No CVA tenderness   Extremities: Extremities, atraumatic, no cyanosis, clubbing, +2 edema   Skin: As above, surgical scars  Neurologic: Alert and oriented times 3, extremity strength 5/5 and symmetric           Invasive Devices:   Peripheral IV 09/09/20 Right;Upper Arm (Active)   Site Assessment Clean;Dry; Intact 09/10/20 0830   Dressing Type Transparent 09/10/20 0830   Line Status Flushed;Saline locked 09/10/20 0830   Dressing Status Clean;Dry; Intact 09/10/20 0830   Dressing Change Due 09/13/20 09/10/20 0830       LABS, IMAGING, & OTHER STUDIES:  Lab Results:      I have personally reviewed pertinent labs      Results from last 7 days   Lab Units 09/10/20  0542 09/10/20  0006 09/09/20  1318   WBC Thousand/uL 14 93* 19 36* 25 32*   HEMOGLOBIN g/dL 9 4* 9 6* 10 5*   PLATELETS Thousands/uL 242 720 793 Results from last 7 days   Lab Units 09/10/20  0542 09/10/20  0006 09/09/20  1318 09/09/20  0534 09/09/20  0524   SODIUM mmol/L 139 139 141  --  139   POTASSIUM mmol/L 3 8 3 7 4 0  --  4 4   CHLORIDE mmol/L 109* 106 109*  --  99   CO2 mmol/L 25 28 29  --  31   CO2, I-STAT mmol/L  --   --   --  28  --    BUN mg/dL 24 23 24  --  25*   CREATININE mg/dL 1 81* 1 78* 1 81*  --  1 90*   EGFR ml/min/1 73sq m 30 30 30  --  28   GLUCOSE, ISTAT mg/dl  --   --   --  172*  --    CALCIUM mg/dL 8 4 8 7 8 4  --  9 4   AST U/L  --   --   --   --  11*   ALT U/L  --   --   --   --  5   ALK PHOS U/L  --   --   --   --  148 9*     Results from last 7 days   Lab Units 09/09/20  0542 09/09/20  0525   BLOOD CULTURE  Received in Microbiology Lab  Culture in Progress  Received in Microbiology Lab  Culture in Progress  Imaging Studies:   I have personally reviewed pertinent imaging study reports and images in PACS  EKG, Pathology, and Other Studies:   I have personally reviewed pertinent reports

## 2020-09-10 NOTE — PHYSICAL THERAPY NOTE
Physical Therapy Evaluation    Patient's Name: Soledad Siemens    Admitting Diagnosis  Pneumonia [J18 9]    Problem List  Patient Active Problem List   Diagnosis    Anemia of chronic renal failure, stage 3 (moderate) (HCC)    Chronic kidney disease, stage III (moderate) (HCC)    Hypertensive chronic kidney disease with stage 1 through stage 4 chronic kidney disease, or unspecified chronic kidney disease    Persistent proteinuria    Iron deficiency    Dyslipidemia    [facility]    SOB (shortness of breath)       Past Medical History  Past Medical History:   Diagnosis Date    Anemia     Arthritis     [facility] of kidney ([facility] )     Chronic kidney disease     Diabetes mellitus ([place] [address] )     Disease of thyroid gland     HLD (hyperlipidemia)     Hypertension     [facility]varian cancer [facility])        Past Surgical History  Past Surgical History:   Procedure Laterality Date    GALLBLADDER SURGERY  05/01/2004    HYSTERECTOMY  06/01/2018    NEPHRECTOMY Right 08/02/2018        09/10/20 0930   PT Last Visit   PT Visit Date 09/10/20   Note Type   Note type Eval only   Pain Assessment   Pain Assessment Tool 0-10   Pain Score 3   Pain Location/Orientation Location: Generalized   Patient's Stated Pain Goal No pain   Hospital Pain Intervention(s) Ambulation/increased activity;Repositioned   Home Living   Type of Home Apartment   Additional Comments Resides w/ s/o who is pts FT caregiver  0 SOFI  Needs some ADL assist   Has hospital bed , scooter, and can transfer indep   was receiving home PT and was doing some short distance ambulation w/ RW in therapy   Prior Function   Level of [place] Needs assistance with ADLs and functional mobility   Falls in the last 6 months 0   Restrictions/Precautions   Weight Bearing Precautions Per Order No   Other Precautions Multiple lines; Fall Risk;Pain   General   Family/Caregiver Present No   Cognition   Overall Cognitive Status WFL   Arousal/Participation Responsive Attention Attends with cues to redirect   Orientation Level Oriented X4   Memory Unable to assess   Following Commands Follows one step commands without difficulty   Bed Mobility   Rolling R 5  Supervision   Rolling L 5  Supervision   Supine to Sit 5  Supervision   Additional items Increased time required; Bedrails   Additional Comments sat EOB x few minutes prior to transfer   Transfers   Sit to Stand 4  Minimal assistance   Additional items Assist x 1   Stand to Sit 4  Minimal assistance   Additional items Assist x 2   Stand pivot 4  Minimal assistance   Additional items Assist x 1   Balance   Static Sitting Good   Dynamic Sitting Fair   Static Standing Fair -   Dynamic Standing Fair -   Endurance Deficit   Endurance Deficit Yes   Endurance Deficit Description fatigue, weakness, pain   Activity Tolerance   Activity Tolerance Patient limited by fatigue;Patient tolerated treatment well;Patient limited by pain;Treatment limited secondary to medical complications (Comment)   Nurse Made Aware yes   Assessment   Prognosis Good   Problem List Decreased strength;Decreased endurance; Impaired balance;Decreased mobility   Assessment Pt seen for high complexity physical therapy evaluation  Pt is a 62 y/o female w/ history/comorbidities of DM, a-fib, COPD, CKD, HTN, HLD, obesity, renal CA w/ nephrectomy who is now admitted as a transfer from [facility] w/ SOB, fatigue, nausea  Noted to be in rapid a-fib on admit, also w/ SBO and severe sepsis  Due to acute medical issues, need for transfer fro increased level of care, pain, fall risk, note unstable clinical picture  PT consulted to assess mobility, d/c needs  Pt presents w/ decreased functional mob, standing balance, endurance, B LE strength, barriers at home  will benefit from skilled PT to correct for the above problems    Recommend home w  her 24 hr care, home PT   Goals   Patient Goals to go home   STG Expiration Date 09/24/20   Short Term Goal #1 1-2 wks: bed mob and transfers w/ S, standing balance to good dynamically, ambulate 10-50 ft w/ RW and min A of 1, increase B LE strength by 1/2 -1 grade   PT Treatment Day 0   Plan   Treatment/Interventions Functional transfer training;LE strengthening/ROM; Therapeutic exercise; Endurance training;Bed mobility; Equipment eval/education;Patient/family training;Gait training   PT Frequency Other (Comment)  (3-5x/wk)   Recommendation   PT Discharge Recommendation Return to previous environment with social support  (caregiver, home PT)   PT - OK to Discharge Yes  (when stable, see above for recs)   Modified [place] Scale   Modified [place] Scale 4           Jose Don PT, DPT, CSRS

## 2020-09-11 VITALS
WEIGHT: 288.3 LBS | SYSTOLIC BLOOD PRESSURE: 118 MMHG | TEMPERATURE: 98 F | OXYGEN SATURATION: 99 % | BODY MASS INDEX: 49.22 KG/M2 | RESPIRATION RATE: 20 BRPM | HEART RATE: 93 BPM | HEIGHT: 64 IN | DIASTOLIC BLOOD PRESSURE: 64 MMHG

## 2020-09-11 LAB
ANION GAP SERPL CALCULATED.3IONS-SCNC: 3 MMOL/L (ref 4–13)
BASOPHILS # BLD AUTO: 0.02 THOUSANDS/ΜL (ref 0–0.1)
BASOPHILS NFR BLD AUTO: 0 % (ref 0–1)
BUN SERPL-MCNC: 26 MG/DL (ref 5–25)
CALCIUM SERPL-MCNC: 8.7 MG/DL (ref 8.3–10.1)
CHLORIDE SERPL-SCNC: 107 MMOL/L (ref 100–108)
CO2 SERPL-SCNC: 30 MMOL/L (ref 21–32)
CREAT SERPL-MCNC: 1.71 MG/DL (ref 0.6–1.3)
EOSINOPHIL # BLD AUTO: 0.31 THOUSAND/ΜL (ref 0–0.61)
EOSINOPHIL NFR BLD AUTO: 4 % (ref 0–6)
ERYTHROCYTE [DISTWIDTH] IN BLOOD BY AUTOMATED COUNT: 15.9 % (ref 11.6–15.1)
GFR SERPL CREATININE-BSD FRML MDRD: 32 ML/MIN/1.73SQ M
GLUCOSE SERPL-MCNC: 101 MG/DL (ref 65–140)
HCT VFR BLD AUTO: 29.5 % (ref 34.8–46.1)
HGB BLD-MCNC: 8.8 G/DL (ref 11.5–15.4)
IMM GRANULOCYTES # BLD AUTO: 0.03 THOUSAND/UL (ref 0–0.2)
IMM GRANULOCYTES NFR BLD AUTO: 0 % (ref 0–2)
LYMPHOCYTES # BLD AUTO: 0.82 THOUSANDS/ΜL (ref 0.6–4.47)
LYMPHOCYTES NFR BLD AUTO: 11 % (ref 14–44)
MCH RBC QN AUTO: 30.2 PG (ref 26.8–34.3)
MCHC RBC AUTO-ENTMCNC: 29.8 G/DL (ref 31.4–37.4)
MCV RBC AUTO: 101 FL (ref 82–98)
MONOCYTES # BLD AUTO: 0.58 THOUSAND/ΜL (ref 0.17–1.22)
MONOCYTES NFR BLD AUTO: 8 % (ref 4–12)
NEUTROPHILS # BLD AUTO: 5.79 THOUSANDS/ΜL (ref 1.85–7.62)
NEUTS SEG NFR BLD AUTO: 77 % (ref 43–75)
NRBC BLD AUTO-RTO: 0 /100 WBCS
PLATELET # BLD AUTO: 229 THOUSANDS/UL (ref 149–390)
PMV BLD AUTO: 10.2 FL (ref 8.9–12.7)
POTASSIUM SERPL-SCNC: 3.8 MMOL/L (ref 3.5–5.3)
RBC # BLD AUTO: 2.91 MILLION/UL (ref 3.81–5.12)
SODIUM SERPL-SCNC: 140 MMOL/L (ref 136–145)
WBC # BLD AUTO: 7.55 THOUSAND/UL (ref 4.31–10.16)

## 2020-09-11 PROCEDURE — NC001 PR NO CHARGE: Performed by: SURGERY

## 2020-09-11 PROCEDURE — 94760 N-INVAS EAR/PLS OXIMETRY 1: CPT

## 2020-09-11 PROCEDURE — 99232 SBSQ HOSP IP/OBS MODERATE 35: CPT | Performed by: INTERNAL MEDICINE

## 2020-09-11 PROCEDURE — 80048 BASIC METABOLIC PNL TOTAL CA: CPT | Performed by: INTERNAL MEDICINE

## 2020-09-11 PROCEDURE — 85025 COMPLETE CBC W/AUTO DIFF WBC: CPT | Performed by: INTERNAL MEDICINE

## 2020-09-11 PROCEDURE — 99238 HOSP IP/OBS DSCHRG MGMT 30/<: CPT | Performed by: SURGERY

## 2020-09-11 PROCEDURE — 94640 AIRWAY INHALATION TREATMENT: CPT

## 2020-09-11 RX ORDER — CEPHALEXIN 500 MG/1
500 CAPSULE ORAL EVERY 6 HOURS SCHEDULED
Qty: 28 CAPSULE | Refills: 0 | Status: SHIPPED | OUTPATIENT
Start: 2020-09-11 | End: 2020-09-18

## 2020-09-11 RX ORDER — WARFARIN SODIUM 3 MG/1
3 TABLET ORAL
Qty: 30 TABLET | Refills: 0 | Status: SHIPPED | OUTPATIENT
Start: 2020-09-11 | End: 2020-12-07 | Stop reason: SDUPTHER

## 2020-09-11 RX ORDER — ACETAMINOPHEN 325 MG/1
650 TABLET ORAL EVERY 6 HOURS PRN
Qty: 40 TABLET | Refills: 0 | Status: SHIPPED | OUTPATIENT
Start: 2020-09-11 | End: 2020-09-21

## 2020-09-11 RX ORDER — LEVOTHYROXINE SODIUM 0.1 MG/1
100 TABLET ORAL DAILY
Qty: 15 TABLET | Refills: 0 | Status: SHIPPED | OUTPATIENT
Start: 2020-09-11 | End: 2021-05-13

## 2020-09-11 RX ORDER — IRON POLYSACCHARIDE COMPLEX 150 MG
150 CAPSULE ORAL DAILY
Qty: 30 CAPSULE | Refills: 0 | Status: CANCELLED | OUTPATIENT
Start: 2020-09-11 | End: 2020-10-11

## 2020-09-11 RX ADMIN — METOROPROLOL TARTRATE 5 MG: 5 INJECTION, SOLUTION INTRAVENOUS at 13:38

## 2020-09-11 RX ADMIN — LEVALBUTEROL HYDROCHLORIDE 1.25 MG: 1.25 SOLUTION, CONCENTRATE RESPIRATORY (INHALATION) at 19:39

## 2020-09-11 RX ADMIN — CEFAZOLIN SODIUM 1000 MG: 1 SOLUTION INTRAVENOUS at 05:53

## 2020-09-11 RX ADMIN — METOROPROLOL TARTRATE 5 MG: 5 INJECTION, SOLUTION INTRAVENOUS at 18:15

## 2020-09-11 RX ADMIN — HEPARIN SODIUM 7500 UNITS: 5000 INJECTION INTRAVENOUS; SUBCUTANEOUS at 13:40

## 2020-09-11 RX ADMIN — IPRATROPIUM BROMIDE 0.5 MG: 0.5 SOLUTION RESPIRATORY (INHALATION) at 13:18

## 2020-09-11 RX ADMIN — LEVALBUTEROL HYDROCHLORIDE 1.25 MG: 1.25 SOLUTION, CONCENTRATE RESPIRATORY (INHALATION) at 08:53

## 2020-09-11 RX ADMIN — LEVOTHYROXINE SODIUM 88 MCG: 88 TABLET ORAL at 05:58

## 2020-09-11 RX ADMIN — CHLORHEXIDINE GLUCONATE 0.12% ORAL RINSE 15 ML: 1.2 LIQUID ORAL at 10:22

## 2020-09-11 RX ADMIN — METOROPROLOL TARTRATE 5 MG: 5 INJECTION, SOLUTION INTRAVENOUS at 05:57

## 2020-09-11 RX ADMIN — IPRATROPIUM BROMIDE 0.5 MG: 0.5 SOLUTION RESPIRATORY (INHALATION) at 19:39

## 2020-09-11 RX ADMIN — ATORVASTATIN CALCIUM 10 MG: 10 TABLET, FILM COATED ORAL at 10:22

## 2020-09-11 RX ADMIN — MICONAZOLE NITRATE 1 APPLICATION: 20 CREAM TOPICAL at 18:15

## 2020-09-11 RX ADMIN — HEPARIN SODIUM 7500 UNITS: 5000 INJECTION INTRAVENOUS; SUBCUTANEOUS at 05:57

## 2020-09-11 RX ADMIN — PANTOPRAZOLE SODIUM 40 MG: 40 TABLET, DELAYED RELEASE ORAL at 05:57

## 2020-09-11 RX ADMIN — CEFAZOLIN SODIUM 1000 MG: 1 SOLUTION INTRAVENOUS at 13:39

## 2020-09-11 RX ADMIN — IPRATROPIUM BROMIDE 0.5 MG: 0.5 SOLUTION RESPIRATORY (INHALATION) at 08:53

## 2020-09-11 RX ADMIN — LEVALBUTEROL HYDROCHLORIDE 1.25 MG: 1.25 SOLUTION, CONCENTRATE RESPIRATORY (INHALATION) at 13:18

## 2020-09-11 RX ADMIN — MICONAZOLE NITRATE 1 APPLICATION: 20 CREAM TOPICAL at 10:22

## 2020-09-11 NOTE — CASE MANAGEMENT
CM met with pt and her significant other, Constanza to complete d/c planning assessment  Pt lives in a handicap accessible apartment with her [address] who is her caregiver  Cherie Gtz assists with ADLs  Pt has a RW, W/C grab bars, built in shower seat, shower chair, raised toilet seat, electric w/c, hospital bed  Pt uses [facility]  Patient reports she will need ambulance transport home either by [facility] or [facility] but if not available any agency will be acceptable she stated  D/c today spoke with Louann Macias at [facility], w/c [facility] transport set up for 8:15 pm      CM reviewed d/c planning process including the following: identifying help at home, patient preference for d/c planning needs, Discharge Lounge, [facility] Meds to Bed program, availability of treatment team to discuss questions or concerns patient and/or family may have regarding understanding medications and recognizing signs and symptoms once discharged  CM also encouraged patient to follow up with all recommended appointments after discharge  Patient advised of importance for patient and family to participate in managing patients medical well being

## 2020-09-11 NOTE — DISCHARGE INSTRUCTIONS
Cellulitis   WHAT YOU NEED TO KNOW:   Cellulitis is a skin infection caused by bacteria  Cellulitis may go away on its own or you may need treatment  Your healthcare provider may draw a [place] around the outside edges of your cellulitis  If your cellulitis spreads, your healthcare provider will see it outside of the [place]  DISCHARGE INSTRUCTIONS:   Call 911 if:   · You have sudden trouble breathing or chest pain  Return to the emergency department if:   · Your wound gets larger and more painful  · You feel a crackling under your skin when you touch it  · You have purple dots or bumps on your skin, or you see bleeding under your skin  · You have new swelling and pain in your legs  · The red, warm, swollen area gets larger  · You see red streaks coming from the infected area  Contact your healthcare provider if:   · You have a fever  · Your fever or pain does not go away or gets worse  · The area does not get smaller after 2 days of antibiotics  · Your skin is flaking or peeling off  · You have questions or concerns about your condition or care  Medicines:   · Antibiotics  help treat the bacterial infection  · NSAIDs , such as ibuprofen, help decrease swelling, pain, and fever  NSAIDs can cause stomach bleeding or kidney problems in certain people  If you take blood thinner medicine, always ask if NSAIDs are safe for you  Always read the medicine label and follow directions  Do not give these medicines to children under 10months of age without direction from your child's healthcare provider  · Acetaminophen  decreases pain and fever  It is available without a doctor's order  Ask how much to take and how often to take it  Follow directions  Read the labels of all other medicines you are using to see if they also contain acetaminophen, or ask your doctor or pharmacist  Acetaminophen can cause liver damage if not taken correctly   Do not use more than 4 grams (4,000 milligrams) total of acetaminophen in one day  · Take your medicine as directed  Contact your healthcare provider if you think your medicine is not helping or if you have side effects  Tell him or her if you are allergic to any medicine  Keep a list of the medicines, vitamins, and herbs you take  Include the amounts, and when and why you take them  Bring the list or the pill bottles to follow-up visits  Carry your medicine list with you in case of an emergency  Self-care:   · Elevate the area above the level of your heart  as often as you can  This will help decrease swelling and pain  Prop the area on pillows or blankets to keep it elevated comfortably  · Clean the area daily until the wound scabs over  Gently wash the area with soap and water  Pat dry  Use dressings as directed  · Place cool or warm, wet cloths on the area as directed  Use clean cloths and clean water  Leave it on the area until the cloth is room temperature  Pat the area dry with a clean, dry cloth  The cloths may help decrease pain  Prevent cellulitis:   · Do not scratch bug bites or areas of injury  You increase your risk for cellulitis by scratching these areas  · Do not share personal items, such as towels, clothing, and razors  · Clean exercise equipment  with germ-killing  before and after you use it  · Wash your hands often  Use soap and water  Wash your hands after you use the bathroom, change a child's diapers, or sneeze  Wash your hands before you prepare or eat food  Use lotion to prevent dry, cracked skin  · Wear pressure stockings as directed  You may be told to wear the stockings if you have peripheral edema  The stockings improve blood flow and decrease swelling  · Treat athlete's foot  This can help prevent the spread of a bacterial skin infection  Follow up with your healthcare provider within 7 days: Your healthcare provider will check if your cellulitis is getting better  You may need different medicine  Write down your questions so you remember to ask them during your visits  © 2017 [address] [employer] Information is for End User's use only and may not be sold, redistributed or otherwise used for commercial purposes  All illustrations and images included in CareNotes® are the copyrighted property of A D A M , Inc  or [employer]  The above information is an  only  It is not intended as medical advice for individual conditions or treatments  Talk to your doctor, nurse or pharmacist before following any medical regimen to see if it is safe and effective for you

## 2020-09-11 NOTE — PROGRESS NOTES
Progress Note - General Surgery   Binh Phelan 63 y o  female MRN: [id number]  Unit/Bed#: -01 Encounter: [id number]    Assessment:  63 y o  F w/ known ventral hernia, now with CT evidence for partial small bowel obstruction however asymptomatic, tolerating diet and passing flatus  Plan:  Continue regular diet  ID following, continue abx for abdominal wall cellulitis - changed abx to Ancef yesterday and transition to Keflex   F/u blood cx - 1/2 GPCs in clusters   Dispo planning       Subjective/Objective     Subjective: No acute events overnight  Pain is controlled on prn analgesia  +flatulence/BM  Tolerating diet without nausea/vomiting  No fevers, chills  Objective:     Blood pressure 121/64, pulse 87, temperature 97 9 °F (36 6 °C), resp  rate 18, height 5' 4" (1 626 m), weight 131 kg (288 lb 4 8 oz), SpO2 99 %, not currently breastfeeding  ,Body mass index is 49 49 kg/m²  Intake/Output Summary (Last 24 hours) at 9/11/2020 0741  Last data filed at 9/11/2020 0502  Gross per 24 hour   Intake 1610 33 ml   Output 1650 ml   Net -39 67 ml       Invasive Devices     Peripheral Intravenous Line            Peripheral IV 09/10/20 Distal;Dorsal (posterior); Right Forearm less than 1 day                Physical Exam:   NAD, alert and oriented x3  Normocephalic, atraumatic  MMM, EOMI, PERRLA  Norm resp effort on RA  RRR  Abd obese abdomen, soft, nontender, non-distended   Abdominal wall cellulitis improving  No calf tenderness or peripheral edema  Motor/sensation intact in distal extremities  CN grossly intact  -rash/lesions      Lab, Imaging and other studies:  CBC:   Lab Results   Component Value Date    WBC 7 55 09/11/2020    HGB 8 8 (L) 09/11/2020    HCT 29 5 (L) 09/11/2020     (H) 09/11/2020     09/11/2020    MCH 30 2 09/11/2020    MCHC 29 8 (L) 09/11/2020    RDW 15 9 (H) 09/11/2020    MPV 10 2 09/11/2020    NRBC 0 09/11/2020   , CMP:   Lab Results   Component Value Date    SODIUM 140 09/11/2020    K 3 8 09/11/2020     09/11/2020    CO2 30 09/11/2020    BUN 26 (H) 09/11/2020    CREATININE 1 71 (H) 09/11/2020    CALCIUM 8 7 09/11/2020    EGFR 32 09/11/2020   , Coagulation: No results found for: PT, INR, APTT  VTE Pharmacologic Prophylaxis: Heparin  VTE Mechanical Prophylaxis: sequential compression device

## 2020-09-11 NOTE — MALNUTRITION/BMI
This medical record reflects one or more clinical indicators suggestive of morbid obesity  BMI Findings:  BMI Classifications: Morbid Obesity 45-49 9     Body mass index is 49 49 kg/m²  See Nutrition note dated 9/11/20 for additional details  Completed nutrition assessment is viewable in the nutrition documentation  Verbally reviewed general healthy nutrition habits with pt and caregiver, both were receptive  Declined need for written materials but accepted outpatient MNT brochure

## 2020-09-11 NOTE — DISCHARGE SUMMARY
Discharge Summary - Sandeep Mckinney 63 y o  female MRN: [id number]    Unit/Bed#: -01 Encounter: [id number]    Admission Date:   Admission Orders (From admission, onward)     Ordered        09/09/20 1029  Inpatient Admission  Once                     Admitting Diagnosis: Pneumonia [J18 9]    HPI and Summary of Hospital Course: 62 yo female with a ventral hernia and CT findings concerning for bowel obstruction and pneumonia was admitted to our service for IV antibiotics and possible exploratory laparotomy  Her suspected bowel obstruction was asymptomatic at the time of admission, without nausea, vomiting, and she was able to tolerate a diet and pass flatus  However her lactate was 2 8 at presentation with a white count of 14  She also had abdominal cellulitis/panniculitis  She was admitted, fluid resuscitated, and her lactate improved to 1 5  Her clinical condition improved over the next few days with IV ceftriaxone, her WBC came down to 7, cellulitis was receding  Repeat chest imaging revealed no signs of pneumonia  One of two blood cultures taken at the time of admission were positive for GPC in clusters  At the time of discharge it was felt that she was medically stable to continue treatment with PO outpatient antibiotics for an additional 7 days  Significant Findings, Care, Treatment and Services Provided:  Labs Reviewed   CBC AND DIFFERENTIAL - Abnormal       Result Value Ref Range Status    WBC 25 32 (*) 4 31 - 10 16 Thousand/uL Final    RBC 3 44 (*) 3 81 - 5 12 Million/uL Final    Hemoglobin 10 5 (*) 11 5 - 15 4 g/dL Final    Hematocrit 35 1  34 8 - 46 1 % Final     (*) 82 - 98 fL Final    MCH 30 5  26 8 - 34 3 pg Final    MCHC 29 9 (*) 31 4 - 37 4 g/dL Final    RDW 15 7 (*) 11 6 - 15 1 % Final    MPV 9 8  8 9 - 12 7 fL Final    Platelets 902  062 - 390 Thousands/uL Final    nRBC 0  /100 WBCs Final    Comment: This is an appended report    These results have been appended to a previously preliminary verified report  Narrative:      See manual diff done within 3 days  This is an appended report  These results have been appended to a previously verified report  BASIC METABOLIC PANEL - Abnormal    Sodium 141  136 - 145 mmol/L Final    Potassium 4 0  3 5 - 5 3 mmol/L Final    Chloride 109 (*) 100 - 108 mmol/L Final    CO2 29  21 - 32 mmol/L Final    ANION GAP 3 (*) 4 - 13 mmol/L Final    BUN 24  5 - 25 mg/dL Final    Creatinine 1 81 (*) 0 60 - 1 30 mg/dL Final    Comment: Standardized to IDMS reference method    Glucose 111  65 - 140 mg/dL Final    Comment: If the patient is fasting, the ADA then defines impaired fasting glucose as > 100 mg/dL and diabetes as > or equal to 123 mg/dL  Specimen collection should occur prior to Sulfasalazine administration due to the potential for falsely depressed results  Specimen collection should occur prior to Sulfapyridine administration due to the potential for falsely elevated results      Calcium 8 4  8 3 - 10 1 mg/dL Final    eGFR 30  ml/min/1 73sq m Final    Narrative:     [place] guidelines for Chronic Kidney Disease (CKD):     Stage 1 with normal or high GFR (GFR > 90 mL/min/1 73 square meters)    Stage 2 Mild CKD (GFR = 60-89 mL/min/1 73 square meters)    Stage 3A Moderate CKD (GFR = 45-59 mL/min/1 73 square meters)    Stage 3B Moderate CKD (GFR = 30-44 mL/min/1 73 square meters)    Stage 4 Severe CKD (GFR = 15-29 mL/min/1 73 square meters)    Stage 5 End Stage CKD (GFR <15 mL/min/1 73 square meters)  Note: GFR calculation is accurate only with a steady state creatinine   CBC AND DIFFERENTIAL - Abnormal    WBC 19 36 (*) 4 31 - 10 16 Thousand/uL Final    RBC 3 12 (*) 3 81 - 5 12 Million/uL Final    Hemoglobin 9 6 (*) 11 5 - 15 4 g/dL Final    Hematocrit 31 5 (*) 34 8 - 46 1 % Final     (*) 82 - 98 fL Final    MCH 30 8  26 8 - 34 3 pg Final    MCHC 30 5 (*) 31 4 - 37 4 g/dL Final    RDW 15 8 (*) 11 6 - 15 1 % Final MPV 9 5  8 9 - 12 7 fL Final    Platelets 256  427 - 390 Thousands/uL Final    nRBC 0  /100 WBCs Final    Comment: This is an appended report  These results have been appended to a previously preliminary verified report  Neutrophils Relative 92 (*) 43 - 75 % Final    Immat GRANS % 1  0 - 2 % Final    Lymphocytes Relative 4 (*) 14 - 44 % Final    Monocytes Relative 3 (*) 4 - 12 % Final    Eosinophils Relative 0  0 - 6 % Final    Basophils Relative 0  0 - 1 % Final    Neutrophils Absolute 17 86 (*) 1 85 - 7 62 Thousands/µL Final    Immature Grans Absolute 0 12  0 00 - 0 20 Thousand/uL Final    Lymphocytes Absolute 0 75  0 60 - 4 47 Thousands/µL Final    Monocytes Absolute 0 55  0 17 - 1 22 Thousand/µL Final    Eosinophils Absolute 0 03  0 00 - 0 61 Thousand/µL Final    Basophils Absolute 0 05  0 00 - 0 10 Thousands/µL Final    Narrative: This is an appended report  These results have been appended to a previously verified report  BASIC METABOLIC PANEL - Abnormal    Sodium 139  136 - 145 mmol/L Final    Potassium 3 7  3 5 - 5 3 mmol/L Final    Chloride 106  100 - 108 mmol/L Final    CO2 28  21 - 32 mmol/L Final    ANION GAP 5  4 - 13 mmol/L Final    BUN 23  5 - 25 mg/dL Final    Creatinine 1 78 (*) 0 60 - 1 30 mg/dL Final    Comment: Standardized to IDMS reference method    Glucose 106  65 - 140 mg/dL Final    Comment: If the patient is fasting, the ADA then defines impaired fasting glucose as > 100 mg/dL and diabetes as > or equal to 123 mg/dL  Specimen collection should occur prior to Sulfasalazine administration due to the potential for falsely depressed results  Specimen collection should occur prior to Sulfapyridine administration due to the potential for falsely elevated results      Calcium 8 7  8 3 - 10 1 mg/dL Final    eGFR 30  ml/min/1 73sq m Final    Narrative:     [place] guidelines for Chronic Kidney Disease (CKD):     Stage 1 with normal or high GFR (GFR > 90 mL/min/1 73 square meters)    Stage 2 Mild CKD (GFR = 60-89 mL/min/1 73 square meters)    Stage 3A Moderate CKD (GFR = 45-59 mL/min/1 73 square meters)    Stage 3B Moderate CKD (GFR = 30-44 mL/min/1 73 square meters)    Stage 4 Severe CKD (GFR = 15-29 mL/min/1 73 square meters)    Stage 5 End Stage CKD (GFR <15 mL/min/1 73 square meters)  Note: GFR calculation is accurate only with a steady state creatinine   BLOOD GAS, VENOUS - Abnormal    pH, Shayan 7 385  7 300 - 7 400 Final    pCO2, Shayan 42 6  42 0 - 50 0 mm Hg Final    pO2, Shayan 81 4 (*) 35 0 - 45 0 mm Hg Final    HCO3, Shayan 24 9  24 - 30 mmol/L Final    Base Excess, Shayan -0 2  mmol/L Final    O2 Content, Shayan 14 0  ml/dL Final    O2 HGB, VENOUS 93 3 (*) 60 0 - 80 0 % Final   CBC AND DIFFERENTIAL - Abnormal    WBC 14 93 (*) 4 31 - 10 16 Thousand/uL Final    RBC 3 14 (*) 3 81 - 5 12 Million/uL Final    Hemoglobin 9 4 (*) 11 5 - 15 4 g/dL Final    Hematocrit 32 0 (*) 34 8 - 46 1 % Final     (*) 82 - 98 fL Final    MCH 29 9  26 8 - 34 3 pg Final    MCHC 29 4 (*) 31 4 - 37 4 g/dL Final    RDW 15 9 (*) 11 6 - 15 1 % Final    MPV 10 1  8 9 - 12 7 fL Final    Platelets 398  664 - 390 Thousands/uL Final    nRBC 0  /100 WBCs Final    Neutrophils Relative 89 (*) 43 - 75 % Final    Immat GRANS % 1  0 - 2 % Final    Lymphocytes Relative 5 (*) 14 - 44 % Final    Monocytes Relative 4  4 - 12 % Final    Eosinophils Relative 1  0 - 6 % Final    Basophils Relative 0  0 - 1 % Final    Neutrophils Absolute 13 46 (*) 1 85 - 7 62 Thousands/µL Final    Immature Grans Absolute 0 07  0 00 - 0 20 Thousand/uL Final    Lymphocytes Absolute 0 73  0 60 - 4 47 Thousands/µL Final    Monocytes Absolute 0 56  0 17 - 1 22 Thousand/µL Final    Eosinophils Absolute 0 07  0 00 - 0 61 Thousand/µL Final    Basophils Absolute 0 04  0 00 - 0 10 Thousands/µL Final   BASIC METABOLIC PANEL - Abnormal    Sodium 139  136 - 145 mmol/L Final    Potassium 3 8  3 5 - 5 3 mmol/L Final    Chloride 109 (*) 100 - 108 mmol/L Final    CO2 25  21 - 32 mmol/L Final    ANION GAP 5  4 - 13 mmol/L Final    BUN 24  5 - 25 mg/dL Final    Creatinine 1 81 (*) 0 60 - 1 30 mg/dL Final    Comment: Standardized to IDMS reference method    Glucose 97  65 - 140 mg/dL Final    Comment: If the patient is fasting, the ADA then defines impaired fasting glucose as > 100 mg/dL and diabetes as > or equal to 123 mg/dL  Specimen collection should occur prior to Sulfasalazine administration due to the potential for falsely depressed results  Specimen collection should occur prior to Sulfapyridine administration due to the potential for falsely elevated results  Calcium 8 4  8 3 - 10 1 mg/dL Final    eGFR 30  ml/min/1 73sq m Final    Narrative:     National Kid[employer] Foundation guidelines for Chronic Kidney Disease (CKD):     Stage 1 with normal or high GFR (GFR > 90 mL/min/1 73 square meters)    Stage 2 Mild CKD (GFR = 60-89 mL/min/1 73 square meters)    Stage 3A Moderate CKD (GFR = 45-59 mL/min/1 73 square meters)    Stage 3B Moderate CKD (GFR = 30-44 mL/min/1 73 square meters)    Stage 4 Severe CKD (GFR = 15-29 mL/min/1 73 square meters)    Stage 5 End Stage CKD (GFR <15 mL/min/1 73 square meters)  Note: GFR calculation is accurate only with a steady state creatinine   PROTIME-INR - Abnormal    Protime 23 3 (*) 11 6 - 14 5 seconds Final    INR 2 08 (*) 0 84 - 1 19 Final   BASIC METABOLIC PANEL - Abnormal    Sodium 140  136 - 145 mmol/L Final    Potassium 3 8  3 5 - 5 3 mmol/L Final    Chloride 107  100 - 108 mmol/L Final    CO2 30  21 - 32 mmol/L Final    ANION GAP 3 (*) 4 - 13 mmol/L Final    BUN 26 (*) 5 - 25 mg/dL Final    Creatinine 1 71 (*) 0 60 - 1 30 mg/dL Final    Comment: Standardized to IDMS reference method    Glucose 101  65 - 140 mg/dL Final    Comment: If the patient is fasting, the ADA then defines impaired fasting glucose as > 100 mg/dL and diabetes as > or equal to 123 mg/dL    Specimen collection should occur prior to Sulfasalazine administration due to the potential for falsely depressed results  Specimen collection should occur prior to Sulfapyridine administration due to the potential for falsely elevated results      Calcium 8 7  8 3 - 10 1 mg/dL Final    eGFR 32  ml/min/1 73sq m Final    Narrative:     [place] guidelines for Chronic Kidney Disease (CKD):     Stage 1 with normal or high GFR (GFR > 90 mL/min/1 73 square meters)    Stage 2 Mild CKD (GFR = 60-89 mL/min/1 73 square meters)    Stage 3A Moderate CKD (GFR = 45-59 mL/min/1 73 square meters)    Stage 3B Moderate CKD (GFR = 30-44 mL/min/1 73 square meters)    Stage 4 Severe CKD (GFR = 15-29 mL/min/1 73 square meters)    Stage 5 End Stage CKD (GFR <15 mL/min/1 73 square meters)  Note: GFR calculation is accurate only with a steady state creatinine   CBC AND DIFFERENTIAL - Abnormal    WBC 7 55  4 31 - 10 16 Thousand/uL Final    RBC 2 91 (*) 3 81 - 5 12 Million/uL Final    Hemoglobin 8 8 (*) 11 5 - 15 4 g/dL Final    Hematocrit 29 5 (*) 34 8 - 46 1 % Final     (*) 82 - 98 fL Final    MCH 30 2  26 8 - 34 3 pg Final    MCHC 29 8 (*) 31 4 - 37 4 g/dL Final    RDW 15 9 (*) 11 6 - 15 1 % Final    MPV 10 2  8 9 - 12 7 fL Final    Platelets 519  454 - 390 Thousands/uL Final    nRBC 0  /100 WBCs Final    Neutrophils Relative 77 (*) 43 - 75 % Final    Immat GRANS % 0  0 - 2 % Final    Lymphocytes Relative 11 (*) 14 - 44 % Final    Monocytes Relative 8  4 - 12 % Final    Eosinophils Relative 4  0 - 6 % Final    Basophils Relative 0  0 - 1 % Final    Neutrophils Absolute 5 79  1 85 - 7 62 Thousands/µL Final    Immature Grans Absolute 0 03  0 00 - 0 20 Thousand/uL Final    Lymphocytes Absolute 0 82  0 60 - 4 47 Thousands/µL Final    Monocytes Absolute 0 58  0 17 - 1 22 Thousand/µL Final    Eosinophils Absolute 0 31  0 00 - 0 61 Thousand/µL Final    Basophils Absolute 0 02  0 00 - 0 10 Thousands/µL Final   MRSA CULTURE - Normal    MRSA Culture Only     Final    Value: No Methicillin Resistant Staphlyococcus aureus (MRSA) isolated   LACTIC ACID, PLASMA - Normal    LACTIC ACID 1 1  0 5 - 2 0 mmol/L Final    Narrative:     Result may be elevated if tourniquet was used during collection  MAGNESIUM - Normal    Magnesium 2 5  1 6 - 2 6 mg/dL Final   PHOSPHORUS - Normal    Phosphorus 3 2  2 3 - 4 1 mg/dL Final   SPUTUM CULTURE AND GRAM STAIN     Recent Labs     09/11/20  0519   WBC 7 55     Ct Chest Abdomen Pelvis Wo Contrast    Result Date: 9/9/2020  Narrative CT CHEST, ABDOMEN AND PELVIS WITHOUT IV CONTRAST INDICATION:   Sepsis  Shortness of breath  Nausea  Chills, vomiting and shortness of breath  Cough  Patient has suspected COVID-19  COMPARISON:  None  TECHNIQUE: CT examination of the chest, abdomen and pelvis was performed without intravenous contrast   Axial, sagittal, and coronal 2D reformatted images were created from the source data and submitted for interpretation  Radiation dose length product (DLP) for this visit:  2029 mGy-cm   This examination, like all CT scans performed in the [facility], was performed utilizing techniques to minimize radiation dose exposure, including the use of iterative reconstruction and automated exposure control  Enteric contrast was not administered  FINDINGS: Study is limited due to respiratory motion artifact, patient motion artifact and lack of intravenous and oral contrast material  CHEST LUNGS:  There is significant respiratory motion artifact, limiting evaluation  3 mm noncalcified pleural-based nodule left upper lobe (series 204, image 24)  Infiltrate left lower lobe representing atelectasis or early pneumonia  PLEURA:  Unremarkable  HEART/GREAT VESSELS:  The heart is enlarged  Coronary artery calcifications  No evidence of a pericardial effusion  MEDIASTINUM AND FRANCINE:  Unremarkable  CHEST WALL AND LOWER NECK:   Unremarkable   ABDOMEN LIVER/BILIARY TREE:  Mildly enlarged with fatty infiltrative changes  GALLBLADDER:  Surgically absent  SPLEEN:  Mildly enlarged  Calcified granuloma  PANCREAS:  Unremarkable  ADRENAL GLANDS:  Unremarkable  KIDNEYS/URETERS:  Right kidney surgically absent  Left kidney unremarkable, however intravenous contrast material not administered  No renal calyceal or ureteric calculi  No hydronephrosis or hydroureter  STOMACH AND BOWEL:  Evaluation of the GI tract limited due to lack of oral contrast material  Stomach incompletely distended with ingested food products and air  Hiatal hernia  The proximal small bowel is mildly distended and fluid-filled, up to the level of a ventral abdominal wall hernia containing small bowel loops of mid small bowel  Fecalization of small bowel contents within the hernia  Distally, the small bowel is relatively decompressed  The colon is relatively decompressed  APPENDIX: Not clearly identified  No findings to suggest appendicitis  ABDOMINOPELVIC CAVITY:  No ascites  No pneumoperitoneum  No lymphadenopathy  VESSELS:  Atherosclerotic changes are present  No evidence of aneurysm  PELVIS REPRODUCTIVE ORGANS:  Surgically absent  URINARY BLADDER:  Incompletely distended  Grossly unremarkable without intravenous contrast material  ABDOMINAL WALL/INGUINAL REGIONS:  Diastases of the rectus abdominis musculature  Large right paramedian ventral abdominal wall hernia containing obstructed small bowel and colon  OSSEOUS STRUCTURES:  No acute fracture or destructive osseous lesion  Spinal degenerative changes are noted  Impression 1  Limited examination due to respiratory motion artifact, patient motion artifact and lack of intravenous and oral contrast material  2   High-grade mid small bowel obstruction secondary to large right paramedian ventral abdominal wall hernia containing both small and large bowel  Recommend follow-up surgical consultation  3   Left lower lobe infiltrate representing atelectasis or pneumonia   In the setting of clinically suspected/proven COVID-19, the above lung parenchymal findings on CT indicate low confidence level for COVID-19  4   3 mm noncalcified pleural-based nodule left upper lobe  Based on current Fleischner Society 2017 Guidelines on incidental pulmonary nodule, no routine follow-up is needed if the patient is considered low risk for lung cancer  If the patient is considered high risk for lung cancer, 12 month follow-up non-contrast chest CT is recommended     I personally discussed this study with CLARA RILEY on 9/9/2020 at 6:55 AM  Workstation performed: [id number]     XR chest portable   Final Result by Dm Mariano MD (09/10 1132)      Elevation of the left hemidiaphragm with mild left base atelectasis  Nothing to suggest pneumonia  Workstation performed: [id number]             Complications: None    Discharge Diagnosis: Abdominal wall cellulitis/panniculitis, ventral wall hernia    Condition at Discharge: good     Discharge instructions/Information to patient and family:   See after visit summary for information provided to patient and family  Provisions for Follow-Up Care:  See after visit summary for information related to follow-up care and any pertinent home health orders  PCP: Rosaura Lawrence MD    Disposition: Home    Planned Readmission: No    Discharge Statement   I spent 20 minutes discharging the patient  This time was spent on the day of discharge  I had direct contact with the patient on the day of discharge  Additional documentation is required if more than 30 minutes were spent on discharge  Discharge Medications:  See after visit summary for reconciled discharge medications provided to patient and family

## 2020-09-11 NOTE — PROGRESS NOTES
NEPHROLOGY PROGRESS NOTE   Sendy Sarkar 63 y o  female MRN: [id number]  Unit/Bed#: -01 Encounter: [id number]      ASSESSMENT/PLAN:  1  CKD stage 3:  Baseline creatinine seems to be 1 2-1 7 over the past few years  Admitted with creatinine of 1 9 which has been improving down to 1 7 today  · Suspect CKD is related to hypertension, morbid obesity, history of nephrectomy and possible diabetic nephropathy (reports diabetic meds stopped about 1 year ago after 200 lb weight loss)   · Previously saw Dr Diya Pollack in our office but last seen in Oct 2018  · UA:  Trace protein, leukocytes, 4-10 RBCs, 4-10 wbc's and moderate bacteria  · Agree with holding Lasix   · Could d/c IVF from nephrology standpoint as she has good oral intake   · Check a m  [facility]  2  Sepsis:  Felt to be related to abdominal wall cellulitis and possible pneumonia  · Status post vancomycin and cefepime in the ED  · Currently on ancef per ID   · 1/2 blood cultures positive and ID consulted  3  Hypertension: was hypotensive on admission but now improved   · On metoprolol IV for AFib  · Home Lasix on hold  4  AFib with RVR:  Status post Cardizem drip  5  Anemia:  Hemoglobin is slowly trending down since admission, possibly related IV fluids  Down to 8 8 today   6  Solitary kidney status post right nephrectomy due to renal cell carcinoma  7  COPD:  On home oxygen 3 L nasal cannula    Plan Summary:    D/c IVF as she is back on a full diet   Trend BMP    Patient agrees to follow up in [facility] office with Dr Diya Pollack again at d/c     SUBJECTIVE:  Feeling much better  No further chest pain or shortness of breath  Had a large bowel movement yesterday after getting Senokot      OBJECTIVE:  Current Weight: Weight - Scale: 131 kg (288 lb 4 8 oz)  Vitals:    09/11/20 0855   BP:    Pulse:    Resp:    Temp:    SpO2: 99%       Intake/Output Summary (Last 24 hours) at 9/11/2020 1023  Last data filed at 9/11/2020 0831  Gross per 24 hour   Intake 637 58 ml   Output 1650 ml   Net -1012 42 ml     General:  No acute distress, morbidly obese  Skin:  No rash  Eyes:  Sclerae anicteric  ENT:  Moist mucous membranes  Neck:  Trachea midline with no JVD  Chest:  Clear to auscultation bilaterally  CVS:  Regular rate and rhythm  Abdomen:  Soft, nontender, nondistended  Extremities:  No edema  Neuro:  Awake and alert  Psych:  Appropriate affect    Medications:  Scheduled Meds:  Current Facility-Administered Medications   Medication Dose Route Frequency Provider Last Rate    acetaminophen  975 mg Oral Q6H PRN Lita Munoz MD      albuterol  2 puff Inhalation Q4H PRN Alfred Station Lair, PA-C      atorvastatin  10 mg Oral Daily Nate Lair, PA-C      cefazolin  1,000 mg Intravenous Q8H Sha Abrams MD 1,000 mg (09/11/20 0553)    chlorhexidine  15 mL Swish & Spit Q12H [address] Nate Lair, PA-C      fluticasone-vilanterol  1 puff Inhalation Daily Nate Lair, PA-C      heparin (porcine)  7,500 Units Subcutaneous [facility] Alfred Station Lair, PA-C      ipratropium  0 5 mg Nebulization TID Alfred Station Lair, PA-C      levalbuterol  1 25 mg Nebulization TID Alfred Station Lair, PA-C      levothyroxine  88 mcg Oral Early Morning Alfred Station Lair, PA-C      metoprolol  5 mg Intravenous Q6H Nate Lair, PA-C      EARLINE ANTIFUNGAL   Topical BID Floydene Pastel, DO      multi-electrolyte  50 mL/hr Intravenous Continuous Radha Watson MD 50 mL/hr (09/10/20 2245)    pantoprazole  40 mg Oral Early Morning Nate Lair, PA-C      senna-docusate sodium  1 tablet Oral HS Edita Dawkins MD         PRN Meds:   acetaminophen    albuterol    Continuous Infusions:multi-electrolyte, 50 mL/hr, Last Rate: 50 mL/hr (09/10/20 2245)        Laboratory Results:  Results from last 7 days   Lab Units 09/11/20  0519 09/10/20  0542 09/10/20  0006 09/09/20  1318 09/09/20  0534 09/09/20  0524   WBC Thousand/uL 7 55 14 93* 19 36* 25 32*  --  14 69*   HEMOGLOBIN g/dL 8 8* 9 4* 9 6* 10 5*  --  11 7   I STAT HEMOGLOBIN g/dl  --   --   -- --  11 9  --    HEMATOCRIT % 29 5* 32 0* 31 5* 35 1  --  38 2   HEMATOCRIT, ISTAT %  --   --   --   --  35  --    PLATELETS Thousands/uL 229 242 248 284  --  330   SODIUM mmol/L 140 139 139 141  --  139   POTASSIUM mmol/L 3 8 3 8 3 7 4 0  --  4 4   CHLORIDE mmol/L 107 109* 106 109*  --  99   CO2 mmol/L 30 25 28 29  --  31   CO2, I-STAT mmol/L  --   --   --   --  28  --    BUN mg/dL 26* 24 23 24  --  25*   CREATININE mg/dL 1 71* 1 81* 1 78* 1 81*  --  1 90*   CALCIUM mg/dL 8 7 8 4 8 7 8 4  --  9 4   MAGNESIUM mg/dL  --  2 5  --   --   --  1 5*   PHOSPHORUS mg/dL  --  3 2  --   --   --   --    GLUCOSE, ISTAT mg/dl  --   --   --   --  172*  --

## 2020-09-12 DIAGNOSIS — N18.30 CKD (CHRONIC KIDNEY DISEASE) STAGE 3, GFR 30-59 ML/MIN (HCC): Primary | ICD-10-CM

## 2020-09-13 LAB
BACTERIA BLD CULT: ABNORMAL
GRAM STN SPEC: ABNORMAL

## 2020-09-14 ENCOUNTER — TELEPHONE (OUTPATIENT)
Dept: NEPHROLOGY | Facility: CLINIC | Age: 60
End: 2020-09-14

## 2020-09-14 LAB — BACTERIA BLD CULT: NORMAL

## 2020-09-14 NOTE — TELEPHONE ENCOUNTER
Pt is scheduled with [facility] on 10/15 in [facility]   Faxed BMP script to [facility] in [place] on [address]

## 2020-09-14 NOTE — TELEPHONE ENCOUNTER
----- Message from Juan Dennis DO sent at 9/12/2020  8:51 AM EDT -----  Regarding: CKD hospital f/u  Patient has been discharged from [facility]  Please schedule hospital follow up for CKD in 3-4 weeks with Dr Annie Henderson or first available provider  Patient should have BMP prior to office visit which has already been ordered  Thanks

## 2020-09-17 LAB
INR PPP: 2.1
PROTHROMBIN TIME: 20.1 SEC (ref 9–11.5)

## 2020-09-18 ENCOUNTER — ANTICOAG VISIT (OUTPATIENT)
Dept: FAMILY MEDICINE CLINIC | Facility: CLINIC | Age: 60
End: 2020-09-18

## 2020-09-21 ENCOUNTER — TELEPHONE (OUTPATIENT)
Dept: FAMILY MEDICINE CLINIC | Facility: CLINIC | Age: 60
End: 2020-09-21

## 2020-09-21 NOTE — TELEPHONE ENCOUNTER
Needs her [address] faxed to [facility] - for ipratropium (ATROVENT) 0 02 % nebulizer solution     Is resending

## 2020-09-24 ENCOUNTER — OFFICE VISIT (OUTPATIENT)
Dept: FAMILY MEDICINE CLINIC | Facility: CLINIC | Age: 60
End: 2020-09-24
Payer: MEDICARE

## 2020-09-24 VITALS
BODY MASS INDEX: 40.29 KG/M2 | WEIGHT: 236 LBS | DIASTOLIC BLOOD PRESSURE: 70 MMHG | SYSTOLIC BLOOD PRESSURE: 116 MMHG | HEART RATE: 89 BPM | RESPIRATION RATE: 16 BRPM | OXYGEN SATURATION: 98 % | HEIGHT: 64 IN

## 2020-09-24 DIAGNOSIS — N18.30 CKD (CHRONIC KIDNEY DISEASE) STAGE 3, GFR 30-59 ML/MIN (HCC): ICD-10-CM

## 2020-09-24 DIAGNOSIS — F41.0 PANIC ATTACK: ICD-10-CM

## 2020-09-24 DIAGNOSIS — K21.9 GASTROESOPHAGEAL REFLUX DISEASE WITHOUT ESOPHAGITIS: ICD-10-CM

## 2020-09-24 DIAGNOSIS — D64.9 ANEMIA, UNSPECIFIED TYPE: Primary | ICD-10-CM

## 2020-09-24 PROCEDURE — 99214 OFFICE O/P EST MOD 30 MIN: CPT | Performed by: INTERNAL MEDICINE

## 2020-09-24 RX ORDER — LORAZEPAM 0.5 MG/1
0.5 TABLET ORAL EVERY 8 HOURS PRN
Qty: 30 TABLET | Refills: 1 | Status: SHIPPED | OUTPATIENT
Start: 2020-09-24 | End: 2020-12-01 | Stop reason: SDUPTHER

## 2020-09-24 RX ORDER — FAMOTIDINE 20 MG/1
20 TABLET, FILM COATED ORAL 2 TIMES DAILY
Qty: 90 TABLET | Refills: 1 | Status: SHIPPED | OUTPATIENT
Start: 2020-09-24 | End: 2021-03-23 | Stop reason: SDUPTHER

## 2020-09-24 NOTE — PROGRESS NOTES
Assessment/Plan:    Panic attack -will order lorazepam as needed     GERD-stop  pantoprazole and start on famotidine 20 BID     CHF- on lasix 40 mg , no leg swelling     COPD/asthma- on continuous oxygen 3L all the time , ventolin, duoneb     Macrocytic Anemia- HB 8 8 ? Afib - on warfarin , metoprolol    CKD stage 3 ,Cr 1 71, GFR 32    Hypothyroidism - on levothyroxine     No problem-specific Assessment & Plan notes found for this encounter  Diagnoses and all orders for this visit:    Anemia, unspecified type  -     CBC and differential; Future          Subjective:      Patient ID: Irving Concepcion is a 63 y o  female  Recently in hospital for panic attack , found to have small bowel obstruction on CT scan  Denied pain in abdomen  Need something to calm her down when panic attack comes  The following portions of the patient's history were reviewed and updated as appropriate: allergies, current medications, past family history, past medical history, past social history, past surgical history and problem list     Review of Systems      Objective:      /70 (BP Location: Right arm, Patient Position: Sitting, Cuff Size: Adult)   Pulse 89   Resp 16   Ht 5' 4" (1 626 m)   Wt 107 kg (236 lb)   SpO2 98% Comment: 3L O2  BMI 40 51 kg/m²          Physical Exam  Constitutional:       Appearance: Normal appearance  She is obese  HENT:      Head: Normocephalic and atraumatic  Nose: Nose normal       Mouth/Throat:      Mouth: Mucous membranes are moist    Neck:      Musculoskeletal: Normal range of motion and neck supple  Cardiovascular:      Rate and Rhythm: Normal rate and regular rhythm  Pulses: Normal pulses  Heart sounds: Normal heart sounds  Pulmonary:      Effort: Pulmonary effort is normal       Comments: Decreased breath sounds  Abdominal:      General: Bowel sounds are normal       Palpations: Abdomen is soft  Musculoskeletal: Normal range of motion           General: No swelling  Right lower leg: No edema  Left lower leg: No edema  Skin:     General: Skin is warm  Coloration: Skin is not pale  Findings: No erythema  Neurological:      General: No focal deficit present  Mental Status: She is alert and oriented to person, place, and time     Psychiatric:         Mood and Affect: Mood normal          Behavior: Behavior normal

## 2020-09-25 ENCOUNTER — TRANSCRIBE ORDERS (OUTPATIENT)
Dept: ADMINISTRATIVE | Facility: HOSPITAL | Age: 60
End: 2020-09-25

## 2020-09-25 DIAGNOSIS — F17.210 NICOTINE DEPENDENCE, CIGARETTES, UNCOMPLICATED: Primary | ICD-10-CM

## 2020-09-25 DIAGNOSIS — Z87.891 HISTORY OF SMOKING: ICD-10-CM

## 2020-09-25 DIAGNOSIS — R06.02 SHORTNESS OF BREATH: ICD-10-CM

## 2020-09-28 ENCOUNTER — TELEPHONE (OUTPATIENT)
Dept: FAMILY MEDICINE CLINIC | Facility: CLINIC | Age: 60
End: 2020-09-28

## 2020-09-28 ENCOUNTER — TELEPHONE (OUTPATIENT)
Dept: OTHER | Facility: OTHER | Age: 60
End: 2020-09-28

## 2020-09-28 NOTE — TELEPHONE ENCOUNTER
On Call msg from [facility] health nurse called on call and states Pt family reports Pt has temp 102 with no other symptoms  Spoke to [address] advised otc tylenol for temp control if Dave Lima develops SOB or acute pain seek emergency care   Also advised to contact PCP in AM for appointment if symptoms persist

## 2020-09-29 ENCOUNTER — HOSPITAL ENCOUNTER (INPATIENT)
Facility: HOSPITAL | Age: 60
LOS: 3 days | Discharge: HOME HEALTH | DRG: 872 | End: 2020-10-02
Attending: INTERNAL MEDICINE | Admitting: INTERNAL MEDICINE
Payer: MEDICARE

## 2020-09-29 ENCOUNTER — HOSPITAL ENCOUNTER (EMERGENCY)
Facility: HOSPITAL | Age: 60
End: 2020-09-29
Attending: EMERGENCY MEDICINE
Payer: MEDICARE

## 2020-09-29 ENCOUNTER — APPOINTMENT (INPATIENT)
Dept: RADIOLOGY | Facility: HOSPITAL | Age: 60
DRG: 872 | End: 2020-09-29
Payer: MEDICARE

## 2020-09-29 ENCOUNTER — TELEPHONE (OUTPATIENT)
Dept: FAMILY MEDICINE CLINIC | Facility: CLINIC | Age: 60
End: 2020-09-29

## 2020-09-29 VITALS
HEIGHT: 61 IN | RESPIRATION RATE: 20 BRPM | DIASTOLIC BLOOD PRESSURE: 53 MMHG | TEMPERATURE: 97.1 F | OXYGEN SATURATION: 100 % | WEIGHT: 281.53 LBS | SYSTOLIC BLOOD PRESSURE: 105 MMHG | BODY MASS INDEX: 53.15 KG/M2 | HEART RATE: 83 BPM

## 2020-09-29 DIAGNOSIS — L03.311 CELLULITIS OF ABDOMINAL WALL: Primary | ICD-10-CM

## 2020-09-29 DIAGNOSIS — N18.32 STAGE 3B CHRONIC KIDNEY DISEASE (HCC): ICD-10-CM

## 2020-09-29 DIAGNOSIS — R65.21 SEPTIC SHOCK (HCC): ICD-10-CM

## 2020-09-29 DIAGNOSIS — D64.9 CHRONIC ANEMIA: ICD-10-CM

## 2020-09-29 DIAGNOSIS — E78.5 DYSLIPIDEMIA: ICD-10-CM

## 2020-09-29 DIAGNOSIS — E11.9 DIABETES MELLITUS (HCC): Chronic | ICD-10-CM

## 2020-09-29 DIAGNOSIS — A41.9 SEPTIC SHOCK (HCC): ICD-10-CM

## 2020-09-29 PROBLEM — R65.20 SEVERE SEPSIS (HCC): Status: ACTIVE | Noted: 2020-09-29

## 2020-09-29 PROBLEM — I48.0 PAROXYSMAL ATRIAL FIBRILLATION (HCC): Chronic | Status: ACTIVE | Noted: 2020-09-29

## 2020-09-29 PROBLEM — N18.9 ACUTE RENAL FAILURE SUPERIMPOSED ON CHRONIC KIDNEY DISEASE (HCC): Status: ACTIVE | Noted: 2020-09-29

## 2020-09-29 PROBLEM — E03.9 HYPOTHYROID: Chronic | Status: ACTIVE | Noted: 2020-09-29

## 2020-09-29 PROBLEM — J44.9 COPD WITH ASTHMA (HCC): Chronic | Status: ACTIVE | Noted: 2020-09-29

## 2020-09-29 PROBLEM — N17.9 ACUTE RENAL FAILURE SUPERIMPOSED ON CHRONIC KIDNEY DISEASE (HCC): Status: ACTIVE | Noted: 2020-09-29

## 2020-09-29 PROBLEM — J44.89 COPD WITH ASTHMA: Chronic | Status: ACTIVE | Noted: 2020-09-29

## 2020-09-29 LAB
ALBUMIN SERPL BCP-MCNC: 3.4 G/DL (ref 3.4–4.8)
ALP SERPL-CCNC: 114.9 U/L (ref 35–140)
ALT SERPL W P-5'-P-CCNC: 7 U/L (ref 5–54)
ANION GAP SERPL CALCULATED.3IONS-SCNC: 7 MMOL/L (ref 4–13)
APTT PPP: 35 SECONDS (ref 23–31)
AST SERPL W P-5'-P-CCNC: 12 U/L (ref 15–41)
BACTERIA UR QL AUTO: NORMAL /HPF
BASOPHILS # BLD AUTO: 0.02 THOUSANDS/ΜL (ref 0–0.1)
BASOPHILS NFR BLD AUTO: 0 % (ref 0–1)
BILIRUB SERPL-MCNC: 0.9 MG/DL (ref 0.3–1.2)
BILIRUB UR QL STRIP: NEGATIVE
BUN SERPL-MCNC: 30 MG/DL (ref 6–20)
CALCIUM ALBUM COR SERPL-MCNC: 9.9 MG/DL (ref 8.3–10.1)
CALCIUM SERPL-MCNC: 9.4 MG/DL (ref 8.4–10.2)
CHLORIDE SERPL-SCNC: 97 MMOL/L (ref 96–108)
CLARITY UR: CLEAR
CO2 SERPL-SCNC: 31 MMOL/L (ref 22–33)
COLOR UR: YELLOW
CREAT SERPL-MCNC: 2.42 MG/DL (ref 0.4–1.1)
EOSINOPHIL # BLD AUTO: 0.22 THOUSAND/ΜL (ref 0–0.61)
EOSINOPHIL NFR BLD AUTO: 2 % (ref 0–6)
ERYTHROCYTE [DISTWIDTH] IN BLOOD BY AUTOMATED COUNT: 16 % (ref 11.6–15.1)
GFR SERPL CREATININE-BSD FRML MDRD: 21 ML/MIN/1.73SQ M
GLUCOSE SERPL-MCNC: 146 MG/DL (ref 65–140)
GLUCOSE SERPL-MCNC: 86 MG/DL (ref 65–140)
GLUCOSE UR STRIP-MCNC: NEGATIVE MG/DL
HCT VFR BLD AUTO: 35.5 % (ref 34.8–46.1)
HGB BLD-MCNC: 10.6 G/DL (ref 11.5–15.4)
HGB UR QL STRIP.AUTO: ABNORMAL
IMM GRANULOCYTES # BLD AUTO: 0.03 THOUSAND/UL (ref 0–0.2)
IMM GRANULOCYTES NFR BLD AUTO: 0 % (ref 0–2)
INR PPP: 1.26 (ref 0.9–1.1)
KETONES UR STRIP-MCNC: NEGATIVE MG/DL
LACTATE SERPL-SCNC: 0.8 MMOL/L (ref 0.5–2)
LACTATE SERPL-SCNC: 1.4 MMOL/L (ref 0–2)
LEUKOCYTE ESTERASE UR QL STRIP: ABNORMAL
LYMPHOCYTES # BLD AUTO: 0.72 THOUSANDS/ΜL (ref 0.6–4.47)
LYMPHOCYTES NFR BLD AUTO: 6 % (ref 14–44)
MCH RBC QN AUTO: 29.9 PG (ref 26.8–34.3)
MCHC RBC AUTO-ENTMCNC: 29.9 G/DL (ref 31.4–37.4)
MCV RBC AUTO: 100 FL (ref 82–98)
MONOCYTES # BLD AUTO: 0.85 THOUSAND/ΜL (ref 0.17–1.22)
MONOCYTES NFR BLD AUTO: 7 % (ref 4–12)
NEUTROPHILS # BLD AUTO: 11.24 THOUSANDS/ΜL (ref 1.85–7.62)
NEUTS SEG NFR BLD AUTO: 85 % (ref 43–75)
NITRITE UR QL STRIP: NEGATIVE
NON-SQ EPI CELLS URNS QL MICRO: NORMAL /HPF
PH UR STRIP.AUTO: 6 [PH]
PLATELET # BLD AUTO: 312 THOUSANDS/UL (ref 149–390)
PMV BLD AUTO: 9.6 FL (ref 8.9–12.7)
POTASSIUM SERPL-SCNC: 4 MMOL/L (ref 3.5–5)
PROCALCITONIN SERPL-MCNC: 4.63 NG/ML
PROT SERPL-MCNC: 7.5 G/DL (ref 6.4–8.3)
PROT UR STRIP-MCNC: ABNORMAL MG/DL
PROTHROMBIN TIME: 13.2 SECONDS (ref 9.5–12.1)
RBC # BLD AUTO: 3.54 MILLION/UL (ref 3.81–5.12)
RBC #/AREA URNS AUTO: NORMAL /HPF
SARS-COV-2 RNA RESP QL NAA+PROBE: NEGATIVE
SODIUM SERPL-SCNC: 135 MMOL/L (ref 133–145)
SP GR UR STRIP.AUTO: 1.01 (ref 1–1.03)
UROBILINOGEN UR QL STRIP.AUTO: 0.2 E.U./DL
WBC # BLD AUTO: 13.08 THOUSAND/UL (ref 4.31–10.16)
WBC #/AREA URNS AUTO: NORMAL /HPF

## 2020-09-29 PROCEDURE — 71045 X-RAY EXAM CHEST 1 VIEW: CPT

## 2020-09-29 PROCEDURE — G1004 CDSM NDSC: HCPCS

## 2020-09-29 PROCEDURE — 87040 BLOOD CULTURE FOR BACTERIA: CPT | Performed by: EMERGENCY MEDICINE

## 2020-09-29 PROCEDURE — 87635 SARS-COV-2 COVID-19 AMP PRB: CPT | Performed by: NURSE PRACTITIONER

## 2020-09-29 PROCEDURE — 82948 REAGENT STRIP/BLOOD GLUCOSE: CPT

## 2020-09-29 PROCEDURE — 83605 ASSAY OF LACTIC ACID: CPT | Performed by: EMERGENCY MEDICINE

## 2020-09-29 PROCEDURE — 94760 N-INVAS EAR/PLS OXIMETRY 1: CPT

## 2020-09-29 PROCEDURE — 93005 ELECTROCARDIOGRAM TRACING: CPT

## 2020-09-29 PROCEDURE — 85025 COMPLETE CBC W/AUTO DIFF WBC: CPT | Performed by: EMERGENCY MEDICINE

## 2020-09-29 PROCEDURE — 94640 AIRWAY INHALATION TREATMENT: CPT

## 2020-09-29 PROCEDURE — 99285 EMERGENCY DEPT VISIT HI MDM: CPT

## 2020-09-29 PROCEDURE — 96365 THER/PROPH/DIAG IV INF INIT: CPT

## 2020-09-29 PROCEDURE — 74176 CT ABD & PELVIS W/O CONTRAST: CPT

## 2020-09-29 PROCEDURE — 85610 PROTHROMBIN TIME: CPT | Performed by: EMERGENCY MEDICINE

## 2020-09-29 PROCEDURE — 99291 CRITICAL CARE FIRST HOUR: CPT | Performed by: EMERGENCY MEDICINE

## 2020-09-29 PROCEDURE — 84145 PROCALCITONIN (PCT): CPT | Performed by: EMERGENCY MEDICINE

## 2020-09-29 PROCEDURE — 87081 CULTURE SCREEN ONLY: CPT | Performed by: INTERNAL MEDICINE

## 2020-09-29 PROCEDURE — 36415 COLL VENOUS BLD VENIPUNCTURE: CPT | Performed by: EMERGENCY MEDICINE

## 2020-09-29 PROCEDURE — 99223 1ST HOSP IP/OBS HIGH 75: CPT | Performed by: INTERNAL MEDICINE

## 2020-09-29 PROCEDURE — 96361 HYDRATE IV INFUSION ADD-ON: CPT

## 2020-09-29 PROCEDURE — 83605 ASSAY OF LACTIC ACID: CPT | Performed by: NURSE PRACTITIONER

## 2020-09-29 PROCEDURE — 81001 URINALYSIS AUTO W/SCOPE: CPT | Performed by: EMERGENCY MEDICINE

## 2020-09-29 PROCEDURE — 99291 CRITICAL CARE FIRST HOUR: CPT | Performed by: NURSE PRACTITIONER

## 2020-09-29 PROCEDURE — 85730 THROMBOPLASTIN TIME PARTIAL: CPT | Performed by: EMERGENCY MEDICINE

## 2020-09-29 PROCEDURE — 80053 COMPREHEN METABOLIC PANEL: CPT | Performed by: EMERGENCY MEDICINE

## 2020-09-29 RX ORDER — LEVALBUTEROL INHALATION SOLUTION 0.63 MG/3ML
0.63 SOLUTION RESPIRATORY (INHALATION) 4 TIMES DAILY
Status: DISCONTINUED | OUTPATIENT
Start: 2020-09-29 | End: 2020-10-01

## 2020-09-29 RX ORDER — ACETAMINOPHEN 325 MG/1
650 TABLET ORAL EVERY 6 HOURS PRN
Status: DISCONTINUED | OUTPATIENT
Start: 2020-09-29 | End: 2020-10-02 | Stop reason: HOSPADM

## 2020-09-29 RX ORDER — LEVOTHYROXINE SODIUM 0.1 MG/1
100 TABLET ORAL
Status: DISCONTINUED | OUTPATIENT
Start: 2020-09-30 | End: 2020-10-02 | Stop reason: HOSPADM

## 2020-09-29 RX ORDER — SODIUM CHLORIDE, SODIUM GLUCONATE, SODIUM ACETATE, POTASSIUM CHLORIDE, MAGNESIUM CHLORIDE, SODIUM PHOSPHATE, DIBASIC, AND POTASSIUM PHOSPHATE .53; .5; .37; .037; .03; .012; .00082 G/100ML; G/100ML; G/100ML; G/100ML; G/100ML; G/100ML; G/100ML
125 INJECTION, SOLUTION INTRAVENOUS CONTINUOUS
Status: DISCONTINUED | OUTPATIENT
Start: 2020-09-29 | End: 2020-09-30

## 2020-09-29 RX ORDER — LANOLIN ALCOHOL/MO/W.PET/CERES
6 CREAM (GRAM) TOPICAL
Status: DISCONTINUED | OUTPATIENT
Start: 2020-09-29 | End: 2020-10-02 | Stop reason: HOSPADM

## 2020-09-29 RX ORDER — FOLIC ACID 1 MG/1
1000 TABLET ORAL DAILY
Status: DISCONTINUED | OUTPATIENT
Start: 2020-09-29 | End: 2020-10-02 | Stop reason: HOSPADM

## 2020-09-29 RX ORDER — HEPARIN SODIUM 1000 [USP'U]/ML
4000 INJECTION, SOLUTION INTRAVENOUS; SUBCUTANEOUS ONCE
Status: COMPLETED | OUTPATIENT
Start: 2020-09-29 | End: 2020-09-29

## 2020-09-29 RX ORDER — OMEGA-3S/DHA/EPA/FISH OIL/D3 300MG-1000
400 CAPSULE ORAL DAILY
Status: DISCONTINUED | OUTPATIENT
Start: 2020-09-29 | End: 2020-10-02 | Stop reason: HOSPADM

## 2020-09-29 RX ORDER — HEPARIN SODIUM 10000 [USP'U]/100ML
3-20 INJECTION, SOLUTION INTRAVENOUS
Status: DISCONTINUED | OUTPATIENT
Start: 2020-09-29 | End: 2020-10-01

## 2020-09-29 RX ORDER — ALBUTEROL SULFATE 2.5 MG/3ML
2.5 SOLUTION RESPIRATORY (INHALATION) EVERY 6 HOURS PRN
Status: DISCONTINUED | OUTPATIENT
Start: 2020-09-29 | End: 2020-10-02 | Stop reason: HOSPADM

## 2020-09-29 RX ORDER — FAMOTIDINE 20 MG/1
20 TABLET, FILM COATED ORAL DAILY
Status: DISCONTINUED | OUTPATIENT
Start: 2020-09-29 | End: 2020-10-02 | Stop reason: HOSPADM

## 2020-09-29 RX ORDER — SODIUM CHLORIDE 9 MG/ML
150 INJECTION, SOLUTION INTRAVENOUS CONTINUOUS
Status: DISCONTINUED | OUTPATIENT
Start: 2020-09-29 | End: 2020-09-29 | Stop reason: HOSPADM

## 2020-09-29 RX ORDER — SODIUM CHLORIDE, SODIUM GLUCONATE, SODIUM ACETATE, POTASSIUM CHLORIDE, MAGNESIUM CHLORIDE, SODIUM PHOSPHATE, DIBASIC, AND POTASSIUM PHOSPHATE .53; .5; .37; .037; .03; .012; .00082 G/100ML; G/100ML; G/100ML; G/100ML; G/100ML; G/100ML; G/100ML
1000 INJECTION, SOLUTION INTRAVENOUS ONCE
Status: COMPLETED | OUTPATIENT
Start: 2020-09-29 | End: 2020-09-29

## 2020-09-29 RX ORDER — ALBUTEROL SULFATE 90 UG/1
2 AEROSOL, METERED RESPIRATORY (INHALATION) EVERY 4 HOURS
Status: DISCONTINUED | OUTPATIENT
Start: 2020-09-29 | End: 2020-09-29

## 2020-09-29 RX ORDER — ATORVASTATIN CALCIUM 10 MG/1
10 TABLET, FILM COATED ORAL
Status: DISCONTINUED | OUTPATIENT
Start: 2020-09-29 | End: 2020-10-02 | Stop reason: HOSPADM

## 2020-09-29 RX ORDER — CLINDAMYCIN PHOSPHATE 900 MG/50ML
900 INJECTION INTRAVENOUS EVERY 8 HOURS
Status: DISCONTINUED | OUTPATIENT
Start: 2020-09-29 | End: 2020-09-30

## 2020-09-29 RX ORDER — HEPARIN SODIUM 1000 [USP'U]/ML
4000 INJECTION, SOLUTION INTRAVENOUS; SUBCUTANEOUS
Status: DISCONTINUED | OUTPATIENT
Start: 2020-09-29 | End: 2020-10-01

## 2020-09-29 RX ORDER — ATORVASTATIN CALCIUM 10 MG/1
10 TABLET, FILM COATED ORAL DAILY
COMMUNITY
End: 2020-10-02 | Stop reason: HOSPADM

## 2020-09-29 RX ORDER — FLUTICASONE FUROATE AND VILANTEROL 100; 25 UG/1; UG/1
1 POWDER RESPIRATORY (INHALATION) DAILY
Status: DISCONTINUED | OUTPATIENT
Start: 2020-09-30 | End: 2020-10-02 | Stop reason: HOSPADM

## 2020-09-29 RX ORDER — HEPARIN SODIUM 1000 [USP'U]/ML
2000 INJECTION, SOLUTION INTRAVENOUS; SUBCUTANEOUS
Status: DISCONTINUED | OUTPATIENT
Start: 2020-09-29 | End: 2020-10-01

## 2020-09-29 RX ORDER — HEPARIN SODIUM 10000 [USP'U]/100ML
3-20 INJECTION, SOLUTION INTRAVENOUS
Status: DISCONTINUED | OUTPATIENT
Start: 2020-09-29 | End: 2020-09-29

## 2020-09-29 RX ORDER — IRON POLYSACCHARIDE COMPLEX 150 MG
150 CAPSULE ORAL DAILY
Status: DISCONTINUED | OUTPATIENT
Start: 2020-09-29 | End: 2020-10-02 | Stop reason: HOSPADM

## 2020-09-29 RX ADMIN — ATORVASTATIN CALCIUM 10 MG: 10 TABLET, FILM COATED ORAL at 17:03

## 2020-09-29 RX ADMIN — IPRATROPIUM BROMIDE 0.5 MG: 0.5 SOLUTION RESPIRATORY (INHALATION) at 22:43

## 2020-09-29 RX ADMIN — SODIUM CHLORIDE, SODIUM GLUCONATE, SODIUM ACETATE, POTASSIUM CHLORIDE AND MAGNESIUM CHLORIDE 1000 ML: 526; 502; 368; 37; 30 INJECTION, SOLUTION INTRAVENOUS at 16:45

## 2020-09-29 RX ADMIN — Medication 150 MG: at 17:03

## 2020-09-29 RX ADMIN — METRONIDAZOLE 500 MG: 500 INJECTION, SOLUTION INTRAVENOUS at 17:27

## 2020-09-29 RX ADMIN — MELATONIN 6 MG: at 21:29

## 2020-09-29 RX ADMIN — FAMOTIDINE 20 MG: 20 TABLET ORAL at 17:03

## 2020-09-29 RX ADMIN — SODIUM CHLORIDE, SODIUM GLUCONATE, SODIUM ACETATE, POTASSIUM CHLORIDE AND MAGNESIUM CHLORIDE 75 ML/HR: 526; 502; 368; 37; 30 INJECTION, SOLUTION INTRAVENOUS at 16:16

## 2020-09-29 RX ADMIN — CEFEPIME HYDROCHLORIDE 2000 MG: 2 INJECTION, POWDER, FOR SOLUTION INTRAVENOUS at 16:21

## 2020-09-29 RX ADMIN — PIPERACILLIN AND TAZOBACTAM 3.38 G: 4; .5 INJECTION, POWDER, LYOPHILIZED, FOR SOLUTION INTRAVENOUS at 11:11

## 2020-09-29 RX ADMIN — LEVALBUTEROL HYDROCHLORIDE 0.63 MG: 0.63 SOLUTION RESPIRATORY (INHALATION) at 18:53

## 2020-09-29 RX ADMIN — CLINDAMYCIN IN 5 PERCENT DEXTROSE 900 MG: 18 INJECTION, SOLUTION INTRAVENOUS at 18:03

## 2020-09-29 RX ADMIN — LEVALBUTEROL HYDROCHLORIDE 0.63 MG: 0.63 SOLUTION RESPIRATORY (INHALATION) at 22:43

## 2020-09-29 RX ADMIN — FOLIC ACID 1000 MCG: 1 TABLET ORAL at 17:03

## 2020-09-29 RX ADMIN — SODIUM CHLORIDE 150 ML/HR: 0.9 INJECTION, SOLUTION INTRAVENOUS at 12:31

## 2020-09-29 RX ADMIN — CYANOCOBALAMIN TAB 500 MCG 500 MCG: 500 TAB at 17:02

## 2020-09-29 RX ADMIN — VANCOMYCIN HYDROCHLORIDE 1250 MG: 1.25 INJECTION, POWDER, LYOPHILIZED, FOR SOLUTION INTRAVENOUS at 13:56

## 2020-09-29 RX ADMIN — CHOLECALCIFEROL (VITAMIN D3) 10 MCG (400 UNIT) TABLET 400 UNITS: at 17:32

## 2020-09-29 RX ADMIN — HEPARIN SODIUM 4000 UNITS: 1000 INJECTION INTRAVENOUS; SUBCUTANEOUS at 19:50

## 2020-09-29 RX ADMIN — HEPARIN SODIUM 11.1 UNITS/KG/HR: 10000 INJECTION, SOLUTION INTRAVENOUS at 19:51

## 2020-09-29 RX ADMIN — IPRATROPIUM BROMIDE 0.5 MG: 0.5 SOLUTION RESPIRATORY (INHALATION) at 18:53

## 2020-09-29 RX ADMIN — SODIUM CHLORIDE 1000 ML: 0.9 INJECTION, SOLUTION INTRAVENOUS at 11:20

## 2020-09-29 RX ADMIN — SODIUM CHLORIDE 1000 ML: 0.9 INJECTION, SOLUTION INTRAVENOUS at 11:10

## 2020-09-29 NOTE — TELEPHONE ENCOUNTER
The nurse Zeb Castillo from [address] [place] [address] called to report Ms Wendy Meredith medical issues last night  Shivani Hunt said Costella [place] developed a fever of 102 00 and body ache  The nurse Zeb Castillo said the pin kendra on abdomen has increased redness and patient overall doesn't look well  She completed her abx from cellulitis a few days ago, current temp is 98 0  I advised them per Dr Lou Matt she should report to the hospital due to her extensive medical history he would like a full hospital work up

## 2020-09-30 ENCOUNTER — APPOINTMENT (INPATIENT)
Dept: NON INVASIVE DIAGNOSTICS | Facility: HOSPITAL | Age: 60
DRG: 872 | End: 2020-09-30
Payer: MEDICARE

## 2020-09-30 PROBLEM — K59.09 CHRONIC CONSTIPATION: Chronic | Status: ACTIVE | Noted: 2020-09-30

## 2020-09-30 PROBLEM — E11.9 DIABETES MELLITUS (HCC): Chronic | Status: ACTIVE | Noted: 2020-09-30

## 2020-09-30 LAB
ANION GAP SERPL CALCULATED.3IONS-SCNC: 7 MMOL/L (ref 4–13)
APTT PPP: 79 SECONDS (ref 23–37)
APTT PPP: 82 SECONDS (ref 23–37)
ATRIAL RATE: 0 BPM
BASOPHILS # BLD AUTO: 0.03 THOUSANDS/ΜL (ref 0–0.1)
BASOPHILS NFR BLD AUTO: 0 % (ref 0–1)
BUN SERPL-MCNC: 27 MG/DL (ref 5–25)
CA-I BLD-SCNC: 1.11 MMOL/L (ref 1.12–1.32)
CALCIUM SERPL-MCNC: 8.7 MG/DL (ref 8.3–10.1)
CHLORIDE SERPL-SCNC: 103 MMOL/L (ref 100–108)
CO2 SERPL-SCNC: 27 MMOL/L (ref 21–32)
CREAT SERPL-MCNC: 2.22 MG/DL (ref 0.6–1.3)
EOSINOPHIL # BLD AUTO: 0.24 THOUSAND/ΜL (ref 0–0.61)
EOSINOPHIL NFR BLD AUTO: 4 % (ref 0–6)
ERYTHROCYTE [DISTWIDTH] IN BLOOD BY AUTOMATED COUNT: 15.7 % (ref 11.6–15.1)
EST. AVERAGE GLUCOSE BLD GHB EST-MCNC: 128 MG/DL
GFR SERPL CREATININE-BSD FRML MDRD: 23 ML/MIN/1.73SQ M
GLUCOSE SERPL-MCNC: 113 MG/DL (ref 65–140)
GLUCOSE SERPL-MCNC: 118 MG/DL (ref 65–140)
GLUCOSE SERPL-MCNC: 129 MG/DL (ref 65–140)
GLUCOSE SERPL-MCNC: 216 MG/DL (ref 65–140)
HBA1C MFR BLD: 6.1 %
HCT VFR BLD AUTO: 31.4 % (ref 34.8–46.1)
HGB BLD-MCNC: 9.3 G/DL (ref 11.5–15.4)
IMM GRANULOCYTES # BLD AUTO: 0.05 THOUSAND/UL (ref 0–0.2)
IMM GRANULOCYTES NFR BLD AUTO: 1 % (ref 0–2)
INR PPP: 1.27 (ref 0.84–1.19)
LYMPHOCYTES # BLD AUTO: 0.6 THOUSANDS/ΜL (ref 0.6–4.47)
LYMPHOCYTES NFR BLD AUTO: 9 % (ref 14–44)
MAGNESIUM SERPL-MCNC: 2.2 MG/DL (ref 1.6–2.6)
MCH RBC QN AUTO: 30.6 PG (ref 26.8–34.3)
MCHC RBC AUTO-ENTMCNC: 29.6 G/DL (ref 31.4–37.4)
MCV RBC AUTO: 103 FL (ref 82–98)
MONOCYTES # BLD AUTO: 0.54 THOUSAND/ΜL (ref 0.17–1.22)
MONOCYTES NFR BLD AUTO: 8 % (ref 4–12)
NEUTROPHILS # BLD AUTO: 5.4 THOUSANDS/ΜL (ref 1.85–7.62)
NEUTS SEG NFR BLD AUTO: 78 % (ref 43–75)
NRBC BLD AUTO-RTO: 0 /100 WBCS
PHOSPHATE SERPL-MCNC: 3.9 MG/DL (ref 2.3–4.1)
PLATELET # BLD AUTO: 238 THOUSANDS/UL (ref 149–390)
PMV BLD AUTO: 9.8 FL (ref 8.9–12.7)
POTASSIUM SERPL-SCNC: 3.9 MMOL/L (ref 3.5–5.3)
PR INTERVAL: 136 MS
PROCALCITONIN SERPL-MCNC: 3.53 NG/ML
PROTHROMBIN TIME: 15.8 SECONDS (ref 11.6–14.5)
QRS AXIS: 65 DEGREES
QRSD INTERVAL: 110 MS
QT INTERVAL: 368 MS
QTC INTERVAL: 468 MS
RBC # BLD AUTO: 3.04 MILLION/UL (ref 3.81–5.12)
SODIUM SERPL-SCNC: 137 MMOL/L (ref 136–145)
T WAVE AXIS: 25 DEGREES
VENTRICULAR RATE: 97 BPM
WBC # BLD AUTO: 6.86 THOUSAND/UL (ref 4.31–10.16)

## 2020-09-30 PROCEDURE — 85025 COMPLETE CBC W/AUTO DIFF WBC: CPT | Performed by: INTERNAL MEDICINE

## 2020-09-30 PROCEDURE — 93010 ELECTROCARDIOGRAM REPORT: CPT | Performed by: INTERNAL MEDICINE

## 2020-09-30 PROCEDURE — 97163 PT EVAL HIGH COMPLEX 45 MIN: CPT

## 2020-09-30 PROCEDURE — 82330 ASSAY OF CALCIUM: CPT | Performed by: INTERNAL MEDICINE

## 2020-09-30 PROCEDURE — 94640 AIRWAY INHALATION TREATMENT: CPT

## 2020-09-30 PROCEDURE — 85610 PROTHROMBIN TIME: CPT | Performed by: NURSE PRACTITIONER

## 2020-09-30 PROCEDURE — 94760 N-INVAS EAR/PLS OXIMETRY 1: CPT

## 2020-09-30 PROCEDURE — 82948 REAGENT STRIP/BLOOD GLUCOSE: CPT

## 2020-09-30 PROCEDURE — 84100 ASSAY OF PHOSPHORUS: CPT | Performed by: INTERNAL MEDICINE

## 2020-09-30 PROCEDURE — 99223 1ST HOSP IP/OBS HIGH 75: CPT | Performed by: INTERNAL MEDICINE

## 2020-09-30 PROCEDURE — 93306 TTE W/DOPPLER COMPLETE: CPT

## 2020-09-30 PROCEDURE — 97167 OT EVAL HIGH COMPLEX 60 MIN: CPT

## 2020-09-30 PROCEDURE — 97110 THERAPEUTIC EXERCISES: CPT

## 2020-09-30 PROCEDURE — 85730 THROMBOPLASTIN TIME PARTIAL: CPT | Performed by: INTERNAL MEDICINE

## 2020-09-30 PROCEDURE — 84145 PROCALCITONIN (PCT): CPT | Performed by: INTERNAL MEDICINE

## 2020-09-30 PROCEDURE — 83735 ASSAY OF MAGNESIUM: CPT | Performed by: INTERNAL MEDICINE

## 2020-09-30 PROCEDURE — 93306 TTE W/DOPPLER COMPLETE: CPT | Performed by: INTERNAL MEDICINE

## 2020-09-30 PROCEDURE — 83036 HEMOGLOBIN GLYCOSYLATED A1C: CPT | Performed by: NURSE PRACTITIONER

## 2020-09-30 PROCEDURE — 99232 SBSQ HOSP IP/OBS MODERATE 35: CPT | Performed by: INTERNAL MEDICINE

## 2020-09-30 PROCEDURE — 80048 BASIC METABOLIC PNL TOTAL CA: CPT | Performed by: INTERNAL MEDICINE

## 2020-09-30 RX ORDER — SENNOSIDES 8.6 MG
1 TABLET ORAL
Status: DISCONTINUED | OUTPATIENT
Start: 2020-09-30 | End: 2020-10-02 | Stop reason: HOSPADM

## 2020-09-30 RX ORDER — WARFARIN SODIUM 3 MG/1
3 TABLET ORAL
Status: DISCONTINUED | OUTPATIENT
Start: 2020-09-30 | End: 2020-10-02 | Stop reason: HOSPADM

## 2020-09-30 RX ORDER — NYSTATIN 100000 U/G
1 CREAM TOPICAL 2 TIMES DAILY
Status: DISCONTINUED | OUTPATIENT
Start: 2020-09-30 | End: 2020-09-30

## 2020-09-30 RX ORDER — POLYETHYLENE GLYCOL 3350 17 G/17G
17 POWDER, FOR SOLUTION ORAL DAILY
Status: DISCONTINUED | OUTPATIENT
Start: 2020-09-30 | End: 2020-10-02 | Stop reason: HOSPADM

## 2020-09-30 RX ORDER — NYSTATIN 100000 [USP'U]/G
1 POWDER TOPICAL 2 TIMES DAILY
Status: DISCONTINUED | OUTPATIENT
Start: 2020-09-30 | End: 2020-09-30

## 2020-09-30 RX ORDER — CEFAZOLIN SODIUM 1 G/50ML
1000 SOLUTION INTRAVENOUS EVERY 8 HOURS
Status: DISCONTINUED | OUTPATIENT
Start: 2020-09-30 | End: 2020-10-02

## 2020-09-30 RX ORDER — MAGNESIUM SULFATE HEPTAHYDRATE 40 MG/ML
2 INJECTION, SOLUTION INTRAVENOUS ONCE
Status: DISCONTINUED | OUTPATIENT
Start: 2020-09-30 | End: 2020-09-30

## 2020-09-30 RX ORDER — LORAZEPAM 0.5 MG/1
0.5 TABLET ORAL EVERY 8 HOURS PRN
Status: DISCONTINUED | OUTPATIENT
Start: 2020-09-30 | End: 2020-10-02 | Stop reason: HOSPADM

## 2020-09-30 RX ORDER — MICONAZOLE NITRATE 20 MG/G
CREAM TOPICAL 2 TIMES DAILY
Status: DISCONTINUED | OUTPATIENT
Start: 2020-09-30 | End: 2020-10-02 | Stop reason: HOSPADM

## 2020-09-30 RX ORDER — POLYETHYLENE GLYCOL 3350 17 G/17G
17 POWDER, FOR SOLUTION ORAL DAILY PRN
Status: DISCONTINUED | OUTPATIENT
Start: 2020-09-30 | End: 2020-09-30

## 2020-09-30 RX ADMIN — IPRATROPIUM BROMIDE 0.5 MG: 0.5 SOLUTION RESPIRATORY (INHALATION) at 19:51

## 2020-09-30 RX ADMIN — SENNOSIDES 8.6 MG: 8.6 TABLET, FILM COATED ORAL at 22:21

## 2020-09-30 RX ADMIN — WARFARIN SODIUM 3 MG: 3 TABLET ORAL at 17:22

## 2020-09-30 RX ADMIN — LEVALBUTEROL HYDROCHLORIDE 0.63 MG: 0.63 SOLUTION RESPIRATORY (INHALATION) at 19:52

## 2020-09-30 RX ADMIN — CLINDAMYCIN IN 5 PERCENT DEXTROSE 900 MG: 18 INJECTION, SOLUTION INTRAVENOUS at 01:18

## 2020-09-30 RX ADMIN — CLINDAMYCIN IN 5 PERCENT DEXTROSE 900 MG: 18 INJECTION, SOLUTION INTRAVENOUS at 09:33

## 2020-09-30 RX ADMIN — MELATONIN 6 MG: at 22:21

## 2020-09-30 RX ADMIN — FAMOTIDINE 20 MG: 20 TABLET ORAL at 09:31

## 2020-09-30 RX ADMIN — LORAZEPAM 0.5 MG: 0.5 TABLET ORAL at 14:31

## 2020-09-30 RX ADMIN — INSULIN LISPRO 2 UNITS: 100 INJECTION, SOLUTION INTRAVENOUS; SUBCUTANEOUS at 11:55

## 2020-09-30 RX ADMIN — CYANOCOBALAMIN TAB 500 MCG 500 MCG: 500 TAB at 09:31

## 2020-09-30 RX ADMIN — LEVALBUTEROL HYDROCHLORIDE 0.63 MG: 0.63 SOLUTION RESPIRATORY (INHALATION) at 07:54

## 2020-09-30 RX ADMIN — MICONAZOLE NITRATE 1 APPLICATION: 20 CREAM TOPICAL at 16:47

## 2020-09-30 RX ADMIN — METRONIDAZOLE 500 MG: 500 INJECTION, SOLUTION INTRAVENOUS at 00:44

## 2020-09-30 RX ADMIN — LEVALBUTEROL HYDROCHLORIDE 0.63 MG: 0.63 SOLUTION RESPIRATORY (INHALATION) at 15:32

## 2020-09-30 RX ADMIN — FOLIC ACID 1000 MCG: 1 TABLET ORAL at 09:31

## 2020-09-30 RX ADMIN — CHOLECALCIFEROL (VITAMIN D3) 10 MCG (400 UNIT) TABLET 400 UNITS: at 09:32

## 2020-09-30 RX ADMIN — NYSTATIN 1 APPLICATION: 100000 CREAM TOPICAL at 11:56

## 2020-09-30 RX ADMIN — CEFAZOLIN SODIUM 1000 MG: 1 SOLUTION INTRAVENOUS at 11:57

## 2020-09-30 RX ADMIN — ATORVASTATIN CALCIUM 10 MG: 10 TABLET, FILM COATED ORAL at 16:47

## 2020-09-30 RX ADMIN — CEFEPIME HYDROCHLORIDE 2000 MG: 2 INJECTION, POWDER, FOR SOLUTION INTRAVENOUS at 05:24

## 2020-09-30 RX ADMIN — Medication 12.5 MG: at 20:13

## 2020-09-30 RX ADMIN — POLYETHYLENE GLYCOL 3350 17 G: 17 POWDER, FOR SOLUTION ORAL at 10:58

## 2020-09-30 RX ADMIN — CEFAZOLIN SODIUM 1000 MG: 1 SOLUTION INTRAVENOUS at 20:13

## 2020-09-30 RX ADMIN — IPRATROPIUM BROMIDE 0.5 MG: 0.5 SOLUTION RESPIRATORY (INHALATION) at 15:32

## 2020-09-30 RX ADMIN — IPRATROPIUM BROMIDE 0.5 MG: 0.5 SOLUTION RESPIRATORY (INHALATION) at 07:54

## 2020-09-30 RX ADMIN — Medication 150 MG: at 09:32

## 2020-09-30 RX ADMIN — FLUTICASONE FUROATE AND VILANTEROL TRIFENATATE 1 PUFF: 100; 25 POWDER RESPIRATORY (INHALATION) at 09:33

## 2020-09-30 RX ADMIN — IPRATROPIUM BROMIDE 0.5 MG: 0.5 SOLUTION RESPIRATORY (INHALATION) at 12:41

## 2020-09-30 RX ADMIN — LEVALBUTEROL HYDROCHLORIDE 0.63 MG: 0.63 SOLUTION RESPIRATORY (INHALATION) at 12:41

## 2020-09-30 RX ADMIN — LEVOTHYROXINE SODIUM 100 MCG: 100 TABLET ORAL at 05:24

## 2020-09-30 RX ADMIN — SODIUM CHLORIDE, SODIUM GLUCONATE, SODIUM ACETATE, POTASSIUM CHLORIDE AND MAGNESIUM CHLORIDE 125 ML/HR: 526; 502; 368; 37; 30 INJECTION, SOLUTION INTRAVENOUS at 00:46

## 2020-09-30 RX ADMIN — HEPARIN SODIUM 11.1 UNITS/KG/HR: 10000 INJECTION, SOLUTION INTRAVENOUS at 16:48

## 2020-09-30 RX ADMIN — METRONIDAZOLE 500 MG: 500 INJECTION, SOLUTION INTRAVENOUS at 08:37

## 2020-10-01 LAB
ANION GAP SERPL CALCULATED.3IONS-SCNC: 6 MMOL/L (ref 4–13)
APTT PPP: 72 SECONDS (ref 23–37)
BASOPHILS # BLD AUTO: 0.03 THOUSANDS/ΜL (ref 0–0.1)
BASOPHILS NFR BLD AUTO: 1 % (ref 0–1)
BUN SERPL-MCNC: 23 MG/DL (ref 5–25)
CALCIUM SERPL-MCNC: 8.9 MG/DL (ref 8.3–10.1)
CHLORIDE SERPL-SCNC: 103 MMOL/L (ref 100–108)
CO2 SERPL-SCNC: 28 MMOL/L (ref 21–32)
CREAT SERPL-MCNC: 2.28 MG/DL (ref 0.6–1.3)
EOSINOPHIL # BLD AUTO: 0.31 THOUSAND/ΜL (ref 0–0.61)
EOSINOPHIL NFR BLD AUTO: 6 % (ref 0–6)
ERYTHROCYTE [DISTWIDTH] IN BLOOD BY AUTOMATED COUNT: 15.6 % (ref 11.6–15.1)
GFR SERPL CREATININE-BSD FRML MDRD: 23 ML/MIN/1.73SQ M
GLUCOSE SERPL-MCNC: 107 MG/DL (ref 65–140)
GLUCOSE SERPL-MCNC: 107 MG/DL (ref 65–140)
GLUCOSE SERPL-MCNC: 116 MG/DL (ref 65–140)
GLUCOSE SERPL-MCNC: 119 MG/DL (ref 65–140)
GLUCOSE SERPL-MCNC: 166 MG/DL (ref 65–140)
HCT VFR BLD AUTO: 33.9 % (ref 34.8–46.1)
HGB BLD-MCNC: 9.9 G/DL (ref 11.5–15.4)
IMM GRANULOCYTES # BLD AUTO: 0.07 THOUSAND/UL (ref 0–0.2)
IMM GRANULOCYTES NFR BLD AUTO: 1 % (ref 0–2)
LYMPHOCYTES # BLD AUTO: 0.88 THOUSANDS/ΜL (ref 0.6–4.47)
LYMPHOCYTES NFR BLD AUTO: 16 % (ref 14–44)
MAGNESIUM SERPL-MCNC: 2.2 MG/DL (ref 1.6–2.6)
MCH RBC QN AUTO: 30.4 PG (ref 26.8–34.3)
MCHC RBC AUTO-ENTMCNC: 29.2 G/DL (ref 31.4–37.4)
MCV RBC AUTO: 104 FL (ref 82–98)
MONOCYTES # BLD AUTO: 0.58 THOUSAND/ΜL (ref 0.17–1.22)
MONOCYTES NFR BLD AUTO: 11 % (ref 4–12)
MRSA NOSE QL CULT: NORMAL
NEUTROPHILS # BLD AUTO: 3.54 THOUSANDS/ΜL (ref 1.85–7.62)
NEUTS SEG NFR BLD AUTO: 65 % (ref 43–75)
NRBC BLD AUTO-RTO: 0 /100 WBCS
PHOSPHATE SERPL-MCNC: 3.8 MG/DL (ref 2.3–4.1)
PLATELET # BLD AUTO: 262 THOUSANDS/UL (ref 149–390)
PMV BLD AUTO: 10 FL (ref 8.9–12.7)
POTASSIUM SERPL-SCNC: 4.4 MMOL/L (ref 3.5–5.3)
PROCALCITONIN SERPL-MCNC: 2.37 NG/ML
RBC # BLD AUTO: 3.26 MILLION/UL (ref 3.81–5.12)
SODIUM SERPL-SCNC: 137 MMOL/L (ref 136–145)
WBC # BLD AUTO: 5.41 THOUSAND/UL (ref 4.31–10.16)

## 2020-10-01 PROCEDURE — 99232 SBSQ HOSP IP/OBS MODERATE 35: CPT | Performed by: INTERNAL MEDICINE

## 2020-10-01 PROCEDURE — 83735 ASSAY OF MAGNESIUM: CPT | Performed by: INTERNAL MEDICINE

## 2020-10-01 PROCEDURE — 84145 PROCALCITONIN (PCT): CPT | Performed by: INTERNAL MEDICINE

## 2020-10-01 PROCEDURE — 82948 REAGENT STRIP/BLOOD GLUCOSE: CPT

## 2020-10-01 PROCEDURE — 85025 COMPLETE CBC W/AUTO DIFF WBC: CPT | Performed by: INTERNAL MEDICINE

## 2020-10-01 PROCEDURE — 94640 AIRWAY INHALATION TREATMENT: CPT

## 2020-10-01 PROCEDURE — 99233 SBSQ HOSP IP/OBS HIGH 50: CPT | Performed by: INTERNAL MEDICINE

## 2020-10-01 PROCEDURE — 94760 N-INVAS EAR/PLS OXIMETRY 1: CPT

## 2020-10-01 PROCEDURE — 85730 THROMBOPLASTIN TIME PARTIAL: CPT | Performed by: INTERNAL MEDICINE

## 2020-10-01 PROCEDURE — 84100 ASSAY OF PHOSPHORUS: CPT | Performed by: INTERNAL MEDICINE

## 2020-10-01 PROCEDURE — 80048 BASIC METABOLIC PNL TOTAL CA: CPT | Performed by: INTERNAL MEDICINE

## 2020-10-01 RX ORDER — LEVALBUTEROL INHALATION SOLUTION 0.63 MG/3ML
0.63 SOLUTION RESPIRATORY (INHALATION)
Status: DISCONTINUED | OUTPATIENT
Start: 2020-10-01 | End: 2020-10-02 | Stop reason: HOSPADM

## 2020-10-01 RX ADMIN — WARFARIN SODIUM 3 MG: 3 TABLET ORAL at 17:34

## 2020-10-01 RX ADMIN — IPRATROPIUM BROMIDE 0.5 MG: 0.5 SOLUTION RESPIRATORY (INHALATION) at 19:18

## 2020-10-01 RX ADMIN — LEVALBUTEROL HYDROCHLORIDE 0.63 MG: 0.63 SOLUTION RESPIRATORY (INHALATION) at 19:18

## 2020-10-01 RX ADMIN — MICONAZOLE NITRATE: 20 CREAM TOPICAL at 17:37

## 2020-10-01 RX ADMIN — LORAZEPAM 0.5 MG: 0.5 TABLET ORAL at 20:26

## 2020-10-01 RX ADMIN — CEFAZOLIN SODIUM 1000 MG: 1 SOLUTION INTRAVENOUS at 03:50

## 2020-10-01 RX ADMIN — MICONAZOLE NITRATE: 20 CREAM TOPICAL at 08:31

## 2020-10-01 RX ADMIN — Medication 12.5 MG: at 20:31

## 2020-10-01 RX ADMIN — IPRATROPIUM BROMIDE 0.5 MG: 0.5 SOLUTION RESPIRATORY (INHALATION) at 07:57

## 2020-10-01 RX ADMIN — FOLIC ACID 1000 MCG: 1 TABLET ORAL at 08:30

## 2020-10-01 RX ADMIN — LEVALBUTEROL HYDROCHLORIDE 0.63 MG: 0.63 SOLUTION RESPIRATORY (INHALATION) at 14:33

## 2020-10-01 RX ADMIN — LEVOTHYROXINE SODIUM 100 MCG: 100 TABLET ORAL at 05:05

## 2020-10-01 RX ADMIN — CYANOCOBALAMIN TAB 500 MCG 500 MCG: 500 TAB at 08:30

## 2020-10-01 RX ADMIN — MELATONIN 6 MG: at 22:15

## 2020-10-01 RX ADMIN — LEVALBUTEROL HYDROCHLORIDE 0.63 MG: 0.63 SOLUTION RESPIRATORY (INHALATION) at 07:57

## 2020-10-01 RX ADMIN — CEFAZOLIN SODIUM 1000 MG: 1 SOLUTION INTRAVENOUS at 13:08

## 2020-10-01 RX ADMIN — Medication 12.5 MG: at 08:31

## 2020-10-01 RX ADMIN — Medication 150 MG: at 08:30

## 2020-10-01 RX ADMIN — FAMOTIDINE 20 MG: 20 TABLET ORAL at 08:30

## 2020-10-01 RX ADMIN — ATORVASTATIN CALCIUM 10 MG: 10 TABLET, FILM COATED ORAL at 17:33

## 2020-10-01 RX ADMIN — CEFAZOLIN SODIUM 1000 MG: 1 SOLUTION INTRAVENOUS at 20:25

## 2020-10-01 RX ADMIN — CHOLECALCIFEROL (VITAMIN D3) 10 MCG (400 UNIT) TABLET 400 UNITS: at 08:30

## 2020-10-01 RX ADMIN — IPRATROPIUM BROMIDE 0.5 MG: 0.5 SOLUTION RESPIRATORY (INHALATION) at 14:33

## 2020-10-02 VITALS
OXYGEN SATURATION: 98 % | WEIGHT: 280.87 LBS | SYSTOLIC BLOOD PRESSURE: 139 MMHG | BODY MASS INDEX: 53.03 KG/M2 | RESPIRATION RATE: 16 BRPM | HEIGHT: 61 IN | TEMPERATURE: 98.3 F | HEART RATE: 100 BPM | DIASTOLIC BLOOD PRESSURE: 76 MMHG

## 2020-10-02 LAB
ANION GAP SERPL CALCULATED.3IONS-SCNC: 6 MMOL/L (ref 4–13)
BASOPHILS # BLD AUTO: 0.04 THOUSANDS/ΜL (ref 0–0.1)
BASOPHILS NFR BLD AUTO: 1 % (ref 0–1)
BUN SERPL-MCNC: 23 MG/DL (ref 5–25)
CALCIUM SERPL-MCNC: 9.1 MG/DL (ref 8.3–10.1)
CHLORIDE SERPL-SCNC: 103 MMOL/L (ref 100–108)
CO2 SERPL-SCNC: 30 MMOL/L (ref 21–32)
CREAT SERPL-MCNC: 2.11 MG/DL (ref 0.6–1.3)
EOSINOPHIL # BLD AUTO: 0.35 THOUSAND/ΜL (ref 0–0.61)
EOSINOPHIL NFR BLD AUTO: 6 % (ref 0–6)
ERYTHROCYTE [DISTWIDTH] IN BLOOD BY AUTOMATED COUNT: 15.5 % (ref 11.6–15.1)
GFR SERPL CREATININE-BSD FRML MDRD: 25 ML/MIN/1.73SQ M
GLUCOSE SERPL-MCNC: 104 MG/DL (ref 65–140)
GLUCOSE SERPL-MCNC: 121 MG/DL (ref 65–140)
GLUCOSE SERPL-MCNC: 123 MG/DL (ref 65–140)
GLUCOSE SERPL-MCNC: 137 MG/DL (ref 65–140)
HCT VFR BLD AUTO: 35 % (ref 34.8–46.1)
HGB BLD-MCNC: 10.1 G/DL (ref 11.5–15.4)
IMM GRANULOCYTES # BLD AUTO: 0.07 THOUSAND/UL (ref 0–0.2)
IMM GRANULOCYTES NFR BLD AUTO: 1 % (ref 0–2)
INR PPP: 1.2 (ref 0.84–1.19)
LYMPHOCYTES # BLD AUTO: 0.73 THOUSANDS/ΜL (ref 0.6–4.47)
LYMPHOCYTES NFR BLD AUTO: 13 % (ref 14–44)
MAGNESIUM SERPL-MCNC: 2.1 MG/DL (ref 1.6–2.6)
MCH RBC QN AUTO: 30.1 PG (ref 26.8–34.3)
MCHC RBC AUTO-ENTMCNC: 28.9 G/DL (ref 31.4–37.4)
MCV RBC AUTO: 105 FL (ref 82–98)
MONOCYTES # BLD AUTO: 0.6 THOUSAND/ΜL (ref 0.17–1.22)
MONOCYTES NFR BLD AUTO: 10 % (ref 4–12)
NEUTROPHILS # BLD AUTO: 3.96 THOUSANDS/ΜL (ref 1.85–7.62)
NEUTS SEG NFR BLD AUTO: 69 % (ref 43–75)
NRBC BLD AUTO-RTO: 0 /100 WBCS
PHOSPHATE SERPL-MCNC: 3.6 MG/DL (ref 2.3–4.1)
PLATELET # BLD AUTO: 276 THOUSANDS/UL (ref 149–390)
PMV BLD AUTO: 10.3 FL (ref 8.9–12.7)
POTASSIUM SERPL-SCNC: 4.5 MMOL/L (ref 3.5–5.3)
PROCALCITONIN SERPL-MCNC: 1.25 NG/ML
PROTHROMBIN TIME: 15.1 SECONDS (ref 11.6–14.5)
RBC # BLD AUTO: 3.35 MILLION/UL (ref 3.81–5.12)
SODIUM SERPL-SCNC: 139 MMOL/L (ref 136–145)
WBC # BLD AUTO: 5.75 THOUSAND/UL (ref 4.31–10.16)

## 2020-10-02 PROCEDURE — 85025 COMPLETE CBC W/AUTO DIFF WBC: CPT | Performed by: INTERNAL MEDICINE

## 2020-10-02 PROCEDURE — 82948 REAGENT STRIP/BLOOD GLUCOSE: CPT

## 2020-10-02 PROCEDURE — 85610 PROTHROMBIN TIME: CPT | Performed by: INTERNAL MEDICINE

## 2020-10-02 PROCEDURE — 99239 HOSP IP/OBS DSCHRG MGMT >30: CPT | Performed by: INTERNAL MEDICINE

## 2020-10-02 PROCEDURE — 83735 ASSAY OF MAGNESIUM: CPT | Performed by: INTERNAL MEDICINE

## 2020-10-02 PROCEDURE — 80048 BASIC METABOLIC PNL TOTAL CA: CPT | Performed by: INTERNAL MEDICINE

## 2020-10-02 PROCEDURE — 84100 ASSAY OF PHOSPHORUS: CPT | Performed by: INTERNAL MEDICINE

## 2020-10-02 PROCEDURE — 94640 AIRWAY INHALATION TREATMENT: CPT

## 2020-10-02 PROCEDURE — 97535 SELF CARE MNGMENT TRAINING: CPT

## 2020-10-02 PROCEDURE — 84145 PROCALCITONIN (PCT): CPT | Performed by: INTERNAL MEDICINE

## 2020-10-02 PROCEDURE — 99232 SBSQ HOSP IP/OBS MODERATE 35: CPT | Performed by: INTERNAL MEDICINE

## 2020-10-02 PROCEDURE — 94760 N-INVAS EAR/PLS OXIMETRY 1: CPT

## 2020-10-02 RX ORDER — ATORVASTATIN CALCIUM 10 MG/1
10 TABLET, FILM COATED ORAL
Refills: 0
Start: 2020-10-03 | End: 2020-11-12 | Stop reason: SDUPTHER

## 2020-10-02 RX ORDER — POLYETHYLENE GLYCOL 3350 17 G/17G
17 POWDER, FOR SOLUTION ORAL DAILY
Refills: 0
Start: 2020-10-03 | End: 2020-10-15

## 2020-10-02 RX ORDER — CEPHALEXIN 500 MG/1
500 CAPSULE ORAL EVERY 6 HOURS SCHEDULED
Status: DISCONTINUED | OUTPATIENT
Start: 2020-10-02 | End: 2020-10-02 | Stop reason: HOSPADM

## 2020-10-02 RX ORDER — SENNOSIDES 8.6 MG
8.6 TABLET ORAL
Refills: 0
Start: 2020-10-02 | End: 2020-10-15

## 2020-10-02 RX ORDER — IRON POLYSACCHARIDE COMPLEX 150 MG
150 CAPSULE ORAL DAILY
Refills: 0
Start: 2020-10-03 | End: 2021-05-13

## 2020-10-02 RX ORDER — CEPHALEXIN 500 MG/1
500 CAPSULE ORAL EVERY 6 HOURS SCHEDULED
Qty: 36 CAPSULE | Refills: 0 | Status: SHIPPED | OUTPATIENT
Start: 2020-10-03 | End: 2020-10-12

## 2020-10-02 RX ORDER — FUROSEMIDE 40 MG/1
40 TABLET ORAL DAILY
Refills: 0
Start: 2020-10-07 | End: 2021-07-16

## 2020-10-02 RX ORDER — OMEGA-3S/DHA/EPA/FISH OIL/D3 300MG-1000
400 CAPSULE ORAL DAILY
Refills: 0
Start: 2020-10-03 | End: 2021-01-07

## 2020-10-02 RX ADMIN — CYANOCOBALAMIN TAB 500 MCG 500 MCG: 500 TAB at 09:12

## 2020-10-02 RX ADMIN — CEPHALEXIN 500 MG: 500 CAPSULE ORAL at 12:16

## 2020-10-02 RX ADMIN — MICONAZOLE NITRATE: 20 CREAM TOPICAL at 17:53

## 2020-10-02 RX ADMIN — LEVALBUTEROL HYDROCHLORIDE 0.63 MG: 0.63 SOLUTION RESPIRATORY (INHALATION) at 07:23

## 2020-10-02 RX ADMIN — WARFARIN SODIUM 3 MG: 3 TABLET ORAL at 17:12

## 2020-10-02 RX ADMIN — MICONAZOLE NITRATE: 20 CREAM TOPICAL at 09:12

## 2020-10-02 RX ADMIN — Medication 12.5 MG: at 09:11

## 2020-10-02 RX ADMIN — IPRATROPIUM BROMIDE 0.5 MG: 0.5 SOLUTION RESPIRATORY (INHALATION) at 07:23

## 2020-10-02 RX ADMIN — FAMOTIDINE 20 MG: 20 TABLET ORAL at 09:11

## 2020-10-02 RX ADMIN — CEFAZOLIN SODIUM 1000 MG: 1 SOLUTION INTRAVENOUS at 04:43

## 2020-10-02 RX ADMIN — FLUTICASONE FUROATE AND VILANTEROL TRIFENATATE 1 PUFF: 100; 25 POWDER RESPIRATORY (INHALATION) at 09:12

## 2020-10-02 RX ADMIN — LEVOTHYROXINE SODIUM 100 MCG: 100 TABLET ORAL at 05:03

## 2020-10-02 RX ADMIN — FOLIC ACID 1000 MCG: 1 TABLET ORAL at 09:11

## 2020-10-02 RX ADMIN — Medication 150 MG: at 09:11

## 2020-10-02 RX ADMIN — IPRATROPIUM BROMIDE 0.5 MG: 0.5 SOLUTION RESPIRATORY (INHALATION) at 14:24

## 2020-10-02 RX ADMIN — LEVALBUTEROL HYDROCHLORIDE 0.63 MG: 0.63 SOLUTION RESPIRATORY (INHALATION) at 14:24

## 2020-10-02 RX ADMIN — CHOLECALCIFEROL (VITAMIN D3) 10 MCG (400 UNIT) TABLET 400 UNITS: at 09:11

## 2020-10-02 RX ADMIN — ATORVASTATIN CALCIUM 10 MG: 10 TABLET, FILM COATED ORAL at 16:52

## 2020-10-02 RX ADMIN — LORAZEPAM 0.5 MG: 0.5 TABLET ORAL at 12:16

## 2020-10-02 RX ADMIN — CEPHALEXIN 500 MG: 500 CAPSULE ORAL at 17:12

## 2020-10-04 LAB
BACTERIA BLD CULT: NORMAL
BACTERIA BLD CULT: NORMAL

## 2020-10-05 ENCOUNTER — EPISODE CHANGES (OUTPATIENT)
Dept: CASE MANAGEMENT | Facility: HOSPITAL | Age: 60
End: 2020-10-05

## 2020-10-05 ENCOUNTER — EPISODE CHANGES (OUTPATIENT)
Dept: CASE MANAGEMENT | Facility: OTHER | Age: 60
End: 2020-10-05

## 2020-10-06 ENCOUNTER — PATIENT OUTREACH (OUTPATIENT)
Dept: CASE MANAGEMENT | Facility: OTHER | Age: 60
End: 2020-10-06

## 2020-10-08 LAB
ALBUMIN SERPL-MCNC: 3.5 G/DL (ref 3.6–5.1)
ALBUMIN/GLOB SERPL: 0.9 (CALC) (ref 1–2.5)
ALP SERPL-CCNC: 122 U/L (ref 37–153)
ALT SERPL-CCNC: 13 U/L (ref 6–29)
AST SERPL-CCNC: 26 U/L (ref 10–35)
BASOPHILS # BLD AUTO: 76 CELLS/UL (ref 0–200)
BASOPHILS NFR BLD AUTO: 1 %
BILIRUB SERPL-MCNC: 0.3 MG/DL (ref 0.2–1.2)
BUN SERPL-MCNC: 26 MG/DL (ref 7–25)
BUN/CREAT SERPL: 15 (CALC) (ref 6–22)
CALCIUM SERPL-MCNC: 9 MG/DL (ref 8.6–10.4)
CHLORIDE SERPL-SCNC: 103 MMOL/L (ref 98–110)
CO2 SERPL-SCNC: 30 MMOL/L (ref 20–32)
CREAT SERPL-MCNC: 1.76 MG/DL (ref 0.5–0.99)
EOSINOPHIL # BLD AUTO: 380 CELLS/UL (ref 15–500)
EOSINOPHIL NFR BLD AUTO: 5 %
ERYTHROCYTE [DISTWIDTH] IN BLOOD BY AUTOMATED COUNT: 14.3 % (ref 11–15)
GLOBULIN SER CALC-MCNC: 3.7 G/DL (CALC) (ref 1.9–3.7)
GLUCOSE SERPL-MCNC: 100 MG/DL (ref 65–99)
HCT VFR BLD AUTO: 33.9 % (ref 35–45)
HGB BLD-MCNC: 10.6 G/DL (ref 11.7–15.5)
LYMPHOCYTES # BLD MANUAL: 1824 CELLS/UL (ref 850–3900)
LYMPHOCYTES NFR BLD AUTO: 24 %
MCH RBC QN AUTO: 29.7 PG (ref 27–33)
MCHC RBC AUTO-ENTMCNC: 31.3 G/DL (ref 32–36)
MCV RBC AUTO: 95 FL (ref 80–100)
METAMYELOCYTES # BLD: 76 CELLS/UL
METAMYELOCYTES NFR BLD MANUAL: 1 %
MONOCYTES # BLD AUTO: 608 CELLS/UL (ref 200–950)
MONOCYTES NFR BLD AUTO: 8 %
MYELOCYTES # BLD: 152 CELLS/UL
MYELOCYTES NFR BLD MANUAL: 2 %
NEUTROPHILS # BLD AUTO: 4332 CELLS/UL (ref 1500–7800)
NEUTROPHILS NFR BLD AUTO: 57 %
NEUTS BAND # BLD: 152 CELLS/UL (ref 0–750)
NEUTS BAND NFR BLD MANUAL: 2 %
PLATELET # BLD AUTO: 320 THOUSAND/UL (ref 140–400)
PMV BLD REES-ECKER: 10.1 FL (ref 7.5–12.5)
POTASSIUM SERPL-SCNC: 5.3 MMOL/L (ref 3.5–5.3)
PROT SERPL-MCNC: 7.2 G/DL (ref 6.1–8.1)
RBC # BLD AUTO: 3.57 MILLION/UL (ref 3.8–5.1)
SL AMB EGFR AFRICAN AMERICAN: 36 ML/MIN/1.73M2
SL AMB EGFR NON AFRICAN AMERICAN: 31 ML/MIN/1.73M2
SODIUM SERPL-SCNC: 139 MMOL/L (ref 135–146)
WBC # BLD AUTO: 7.6 THOUSAND/UL (ref 3.8–10.8)

## 2020-10-15 ENCOUNTER — TELEPHONE (OUTPATIENT)
Dept: NEPHROLOGY | Facility: CLINIC | Age: 60
End: 2020-10-15

## 2020-10-15 ENCOUNTER — OFFICE VISIT (OUTPATIENT)
Dept: NEPHROLOGY | Facility: CLINIC | Age: 60
End: 2020-10-15
Payer: MEDICARE

## 2020-10-15 VITALS
TEMPERATURE: 97.3 F | BODY MASS INDEX: 53.07 KG/M2 | HEART RATE: 60 BPM | DIASTOLIC BLOOD PRESSURE: 70 MMHG | SYSTOLIC BLOOD PRESSURE: 116 MMHG | HEIGHT: 61 IN

## 2020-10-15 DIAGNOSIS — D64.9 CHRONIC ANEMIA: ICD-10-CM

## 2020-10-15 DIAGNOSIS — Z90.5 H/O RIGHT NEPHRECTOMY: ICD-10-CM

## 2020-10-15 DIAGNOSIS — N18.32 STAGE 3B CHRONIC KIDNEY DISEASE (HCC): Primary | ICD-10-CM

## 2020-10-15 DIAGNOSIS — I12.9 HYPERTENSIVE CHRONIC KIDNEY DISEASE WITH STAGE 1 THROUGH STAGE 4 CHRONIC KIDNEY DISEASE, OR UNSPECIFIED CHRONIC KIDNEY DISEASE: ICD-10-CM

## 2020-10-15 PROCEDURE — 99214 OFFICE O/P EST MOD 30 MIN: CPT | Performed by: PHYSICIAN ASSISTANT

## 2020-10-22 ENCOUNTER — PATIENT OUTREACH (OUTPATIENT)
Dept: CASE MANAGEMENT | Facility: OTHER | Age: 60
End: 2020-10-22

## 2020-11-02 DIAGNOSIS — D64.9 CHRONIC ANEMIA: Primary | ICD-10-CM

## 2020-11-06 ENCOUNTER — PATIENT OUTREACH (OUTPATIENT)
Dept: CASE MANAGEMENT | Facility: OTHER | Age: 60
End: 2020-11-06

## 2020-11-06 LAB
BASOPHILS # BLD AUTO: 32 CELLS/UL (ref 0–200)
BASOPHILS NFR BLD AUTO: 0.4 %
EOSINOPHIL # BLD AUTO: 182 CELLS/UL (ref 15–500)
EOSINOPHIL NFR BLD AUTO: 2.3 %
ERYTHROCYTE [DISTWIDTH] IN BLOOD BY AUTOMATED COUNT: 14.8 % (ref 11–15)
HCT VFR BLD AUTO: 32.5 % (ref 35–45)
HGB BLD-MCNC: 10.2 G/DL (ref 11.7–15.5)
LYMPHOCYTES # BLD AUTO: 1383 CELLS/UL (ref 850–3900)
LYMPHOCYTES NFR BLD AUTO: 17.5 %
MCH RBC QN AUTO: 30.4 PG (ref 27–33)
MCHC RBC AUTO-ENTMCNC: 31.4 G/DL (ref 32–36)
MCV RBC AUTO: 96.7 FL (ref 80–100)
MONOCYTES # BLD AUTO: 703 CELLS/UL (ref 200–950)
MONOCYTES NFR BLD AUTO: 8.9 %
NEUTROPHILS # BLD AUTO: 5601 CELLS/UL (ref 1500–7800)
NEUTROPHILS NFR BLD AUTO: 70.9 %
PLATELET # BLD AUTO: 255 THOUSAND/UL (ref 140–400)
PMV BLD REES-ECKER: 10 FL (ref 7.5–12.5)
RBC # BLD AUTO: 3.36 MILLION/UL (ref 3.8–5.1)
WBC # BLD AUTO: 7.9 THOUSAND/UL (ref 3.8–10.8)

## 2020-11-12 ENCOUNTER — TELEPHONE (OUTPATIENT)
Dept: FAMILY MEDICINE CLINIC | Facility: CLINIC | Age: 60
End: 2020-11-12

## 2020-11-12 DIAGNOSIS — E78.5 DYSLIPIDEMIA: ICD-10-CM

## 2020-11-12 RX ORDER — ATORVASTATIN CALCIUM 10 MG/1
10 TABLET, FILM COATED ORAL
Qty: 90 TABLET | Refills: 1
Start: 2020-11-12 | End: 2020-11-16 | Stop reason: SDUPTHER

## 2020-11-16 DIAGNOSIS — E78.5 DYSLIPIDEMIA: ICD-10-CM

## 2020-11-16 RX ORDER — ATORVASTATIN CALCIUM 10 MG/1
10 TABLET, FILM COATED ORAL
Qty: 90 TABLET | Refills: 1
Start: 2020-11-16 | End: 2021-03-23 | Stop reason: SDUPTHER

## 2020-11-17 ENCOUNTER — TELEPHONE (OUTPATIENT)
Dept: FAMILY MEDICINE CLINIC | Facility: CLINIC | Age: 60
End: 2020-11-17

## 2020-11-17 DIAGNOSIS — D64.9 LOW HEMOGLOBIN: Primary | ICD-10-CM

## 2020-11-20 ENCOUNTER — PATIENT OUTREACH (OUTPATIENT)
Dept: CASE MANAGEMENT | Facility: OTHER | Age: 60
End: 2020-11-20

## 2020-11-21 LAB
BASOPHILS # BLD AUTO: 28 CELLS/UL (ref 0–200)
BASOPHILS NFR BLD AUTO: 0.4 %
EOSINOPHIL # BLD AUTO: 168 CELLS/UL (ref 15–500)
EOSINOPHIL NFR BLD AUTO: 2.4 %
ERYTHROCYTE [DISTWIDTH] IN BLOOD BY AUTOMATED COUNT: 14.2 % (ref 11–15)
HCT VFR BLD AUTO: 32.8 % (ref 35–45)
HGB BLD-MCNC: 10.5 G/DL (ref 11.7–15.5)
LYMPHOCYTES # BLD AUTO: 1309 CELLS/UL (ref 850–3900)
LYMPHOCYTES NFR BLD AUTO: 18.7 %
MCH RBC QN AUTO: 30.8 PG (ref 27–33)
MCHC RBC AUTO-ENTMCNC: 32 G/DL (ref 32–36)
MCV RBC AUTO: 96.2 FL (ref 80–100)
MONOCYTES # BLD AUTO: 553 CELLS/UL (ref 200–950)
MONOCYTES NFR BLD AUTO: 7.9 %
NEUTROPHILS # BLD AUTO: 4942 CELLS/UL (ref 1500–7800)
NEUTROPHILS NFR BLD AUTO: 70.6 %
PLATELET # BLD AUTO: 312 THOUSAND/UL (ref 140–400)
PMV BLD REES-ECKER: 10.1 FL (ref 7.5–12.5)
RBC # BLD AUTO: 3.41 MILLION/UL (ref 3.8–5.1)
WBC # BLD AUTO: 7 THOUSAND/UL (ref 3.8–10.8)

## 2020-12-01 ENCOUNTER — TELEPHONE (OUTPATIENT)
Dept: FAMILY MEDICINE CLINIC | Facility: CLINIC | Age: 60
End: 2020-12-01

## 2020-12-01 DIAGNOSIS — F41.0 PANIC ATTACK: ICD-10-CM

## 2020-12-01 RX ORDER — LORAZEPAM 0.5 MG/1
0.5 TABLET ORAL EVERY 8 HOURS PRN
Qty: 30 TABLET | Refills: 1 | Status: SHIPPED | OUTPATIENT
Start: 2020-12-01 | End: 2021-01-12 | Stop reason: SDUPTHER

## 2020-12-07 DIAGNOSIS — I48.91 ATRIAL FIBRILLATION, UNSPECIFIED TYPE (HCC): ICD-10-CM

## 2020-12-07 RX ORDER — WARFARIN SODIUM 3 MG/1
3 TABLET ORAL
Qty: 30 TABLET | Refills: 0 | Status: SHIPPED | OUTPATIENT
Start: 2020-12-07 | End: 2021-03-23 | Stop reason: SDUPTHER

## 2020-12-11 ENCOUNTER — PATIENT OUTREACH (OUTPATIENT)
Dept: CASE MANAGEMENT | Facility: HOSPITAL | Age: 60
End: 2020-12-11

## 2020-12-17 DIAGNOSIS — I48.91 ATRIAL FIBRILLATION, UNSPECIFIED TYPE (HCC): Primary | ICD-10-CM

## 2020-12-18 RX ORDER — WARFARIN SODIUM 1 MG/1
TABLET ORAL
Qty: 30 TABLET | Refills: 5 | Status: SHIPPED | OUTPATIENT
Start: 2020-12-18 | End: 2021-03-23 | Stop reason: SDUPTHER

## 2020-12-22 ENCOUNTER — OFFICE VISIT (OUTPATIENT)
Dept: FAMILY MEDICINE CLINIC | Facility: CLINIC | Age: 60
End: 2020-12-22
Payer: MEDICARE

## 2020-12-22 VITALS
HEIGHT: 61 IN | RESPIRATION RATE: 20 BRPM | SYSTOLIC BLOOD PRESSURE: 122 MMHG | OXYGEN SATURATION: 95 % | BODY MASS INDEX: 55.32 KG/M2 | TEMPERATURE: 98.6 F | WEIGHT: 293 LBS | DIASTOLIC BLOOD PRESSURE: 62 MMHG | HEART RATE: 80 BPM

## 2020-12-22 DIAGNOSIS — N17.9 ACUTE RENAL FAILURE SUPERIMPOSED ON CHRONIC KIDNEY DISEASE (HCC): ICD-10-CM

## 2020-12-22 DIAGNOSIS — I48.11 LONGSTANDING PERSISTENT ATRIAL FIBRILLATION (HCC): Primary | ICD-10-CM

## 2020-12-22 DIAGNOSIS — N18.9 ACUTE RENAL FAILURE SUPERIMPOSED ON CHRONIC KIDNEY DISEASE (HCC): ICD-10-CM

## 2020-12-22 DIAGNOSIS — E03.9 HYPOTHYROID: Chronic | ICD-10-CM

## 2020-12-22 DIAGNOSIS — I48.0 PAROXYSMAL ATRIAL FIBRILLATION (HCC): Chronic | ICD-10-CM

## 2020-12-22 DIAGNOSIS — L03.311 CELLULITIS OF ABDOMINAL WALL: ICD-10-CM

## 2020-12-22 DIAGNOSIS — F41.9 ANXIETY: ICD-10-CM

## 2020-12-22 DIAGNOSIS — J44.9 COPD WITH ASTHMA (HCC): Chronic | ICD-10-CM

## 2020-12-22 DIAGNOSIS — K21.9 GERD (GASTROESOPHAGEAL REFLUX DISEASE): ICD-10-CM

## 2020-12-22 DIAGNOSIS — E11.9 DIABETES MELLITUS (HCC): Chronic | ICD-10-CM

## 2020-12-22 PROCEDURE — 99213 OFFICE O/P EST LOW 20 MIN: CPT | Performed by: INTERNAL MEDICINE

## 2020-12-22 RX ORDER — ESCITALOPRAM OXALATE 10 MG/1
10 TABLET ORAL DAILY
Qty: 90 TABLET | Refills: 0 | Status: SHIPPED | OUTPATIENT
Start: 2020-12-22 | End: 2021-05-13

## 2020-12-22 RX ORDER — FAMOTIDINE 20 MG/1
20 TABLET, FILM COATED ORAL 2 TIMES DAILY
Qty: 180 TABLET | Refills: 0 | Status: SHIPPED | OUTPATIENT
Start: 2020-12-22 | End: 2021-05-13

## 2020-12-30 ENCOUNTER — PATIENT OUTREACH (OUTPATIENT)
Dept: CASE MANAGEMENT | Facility: HOSPITAL | Age: 60
End: 2020-12-30

## 2021-01-04 ENCOUNTER — TELEPHONE (OUTPATIENT)
Dept: FAMILY MEDICINE CLINIC | Facility: CLINIC | Age: 61
End: 2021-01-04

## 2021-01-05 LAB
25(OH)D3 SERPL-MCNC: 47 NG/ML (ref 30–100)
ALBUMIN SERPL-MCNC: 3.7 G/DL (ref 3.6–5.1)
ALBUMIN/GLOB SERPL: 1.1 (CALC) (ref 1–2.5)
ALP SERPL-CCNC: 134 U/L (ref 37–153)
ALT SERPL-CCNC: 11 U/L (ref 6–29)
APPEARANCE UR: CLEAR
AST SERPL-CCNC: 12 U/L (ref 10–35)
BACTERIA UR QL AUTO: ABNORMAL /HPF
BASOPHILS # BLD AUTO: 38 CELLS/UL (ref 0–200)
BASOPHILS NFR BLD AUTO: 0.5 %
BILIRUB SERPL-MCNC: 0.4 MG/DL (ref 0.2–1.2)
BILIRUB UR QL STRIP: NEGATIVE
BUN SERPL-MCNC: 25 MG/DL (ref 7–25)
BUN/CREAT SERPL: 15 (CALC) (ref 6–22)
CALCIUM SERPL-MCNC: 8.9 MG/DL (ref 8.6–10.4)
CALCIUM SERPL-MCNC: 8.9 MG/DL (ref 8.6–10.4)
CHLORIDE SERPL-SCNC: 103 MMOL/L (ref 98–110)
CO2 SERPL-SCNC: 31 MMOL/L (ref 20–32)
COLOR UR: YELLOW
CREAT SERPL-MCNC: 1.69 MG/DL (ref 0.5–0.99)
CREAT UR-MCNC: 111 MG/DL (ref 20–275)
EOSINOPHIL # BLD AUTO: 152 CELLS/UL (ref 15–500)
EOSINOPHIL NFR BLD AUTO: 2 %
ERYTHROCYTE [DISTWIDTH] IN BLOOD BY AUTOMATED COUNT: 13.9 % (ref 11–15)
FERRITIN SERPL-MCNC: 68 NG/ML (ref 16–288)
GLOBULIN SER CALC-MCNC: 3.4 G/DL (CALC) (ref 1.9–3.7)
GLUCOSE SERPL-MCNC: 152 MG/DL (ref 65–99)
GLUCOSE UR QL STRIP: NEGATIVE
HCT VFR BLD AUTO: 34.6 % (ref 35–45)
HGB BLD-MCNC: 11.1 G/DL (ref 11.7–15.5)
HGB UR QL STRIP: ABNORMAL
HYALINE CASTS #/AREA URNS LPF: ABNORMAL /LPF
IRON SATN MFR SERPL: 25 % (CALC) (ref 16–45)
IRON SERPL-MCNC: 76 MCG/DL (ref 45–160)
KETONES UR QL STRIP: NEGATIVE
LEUKOCYTE ESTERASE UR QL STRIP: ABNORMAL
LYMPHOCYTES # BLD AUTO: 935 CELLS/UL (ref 850–3900)
LYMPHOCYTES NFR BLD AUTO: 12.3 %
MAGNESIUM SERPL-MCNC: 1.9 MG/DL (ref 1.5–2.5)
MCH RBC QN AUTO: 30.7 PG (ref 27–33)
MCHC RBC AUTO-ENTMCNC: 32.1 G/DL (ref 32–36)
MCV RBC AUTO: 95.6 FL (ref 80–100)
MONOCYTES # BLD AUTO: 616 CELLS/UL (ref 200–950)
MONOCYTES NFR BLD AUTO: 8.1 %
NEUTROPHILS # BLD AUTO: 5860 CELLS/UL (ref 1500–7800)
NEUTROPHILS NFR BLD AUTO: 77.1 %
NITRITE UR QL STRIP: POSITIVE
PH UR STRIP: 5.5 [PH] (ref 5–8)
PHOSPHATE SERPL-MCNC: 2.9 MG/DL (ref 2.5–4.5)
PLATELET # BLD AUTO: 254 THOUSAND/UL (ref 140–400)
PMV BLD REES-ECKER: 10.8 FL (ref 7.5–12.5)
POTASSIUM SERPL-SCNC: 4.2 MMOL/L (ref 3.5–5.3)
PROT SERPL-MCNC: 7.1 G/DL (ref 6.1–8.1)
PROT UR QL STRIP: ABNORMAL
PROT UR-MCNC: 40 MG/DL (ref 5–24)
PROT/CREAT UR: 0.36 MG/MG CREAT (ref 0.02–0.16)
PROT/CREAT UR: 360 MG/G CREAT (ref 21–161)
PTH-INTACT SERPL-MCNC: 73 PG/ML (ref 14–64)
RBC # BLD AUTO: 3.62 MILLION/UL (ref 3.8–5.1)
RBC #/AREA URNS HPF: ABNORMAL /HPF
SL AMB EGFR AFRICAN AMERICAN: 37 ML/MIN/1.73M2
SL AMB EGFR NON AFRICAN AMERICAN: 32 ML/MIN/1.73M2
SODIUM SERPL-SCNC: 141 MMOL/L (ref 135–146)
SP GR UR STRIP: 1.02 (ref 1–1.03)
SQUAMOUS #/AREA URNS HPF: ABNORMAL /HPF
TIBC SERPL-MCNC: 299 MCG/DL (CALC) (ref 250–450)
WBC # BLD AUTO: 7.6 THOUSAND/UL (ref 3.8–10.8)
WBC #/AREA URNS HPF: ABNORMAL /HPF

## 2021-01-06 ENCOUNTER — ANTICOAG VISIT (OUTPATIENT)
Dept: FAMILY MEDICINE CLINIC | Facility: CLINIC | Age: 61
End: 2021-01-06

## 2021-01-06 ENCOUNTER — TELEPHONE (OUTPATIENT)
Dept: NEPHROLOGY | Facility: CLINIC | Age: 61
End: 2021-01-06

## 2021-01-06 LAB
INR PPP: 5.2
PROTHROMBIN TIME: 48.8 SEC (ref 9–11.5)

## 2021-01-06 NOTE — PROGRESS NOTES
As per Dr Deshawn Gillette, hold dose for two days, will contact pt 1/7 for further instructions    Spoke to pt

## 2021-01-06 NOTE — TELEPHONE ENCOUNTER
Talked with patient's caregiver  States pt is feeling well  No UTI symptoms  Advised caregiver that labs are stable per [facility]     ----- Message from [place], [place] sent at 1/5/2021  4:37 PM EST -----  Blood work stable  Please make sure patient is not having any UTI symptoms of burning or frequency  Thanks

## 2021-01-07 DIAGNOSIS — N18.32 STAGE 3B CHRONIC KIDNEY DISEASE (HCC): ICD-10-CM

## 2021-01-07 RX ORDER — OMEGA-3S/DHA/EPA/FISH OIL/D3 300MG-1000
CAPSULE ORAL
Qty: 90 TABLET | Refills: 1 | Status: SHIPPED | OUTPATIENT
Start: 2021-01-07 | End: 2021-03-07

## 2021-01-12 DIAGNOSIS — F41.0 PANIC ATTACK: ICD-10-CM

## 2021-01-12 PROBLEM — N18.32 ANEMIA DUE TO STAGE 3B CHRONIC KIDNEY DISEASE (HCC): Status: ACTIVE | Noted: 2018-10-04

## 2021-01-12 PROBLEM — N25.81 SECONDARY HYPERPARATHYROIDISM OF RENAL ORIGIN (HCC): Status: ACTIVE | Noted: 2021-01-12

## 2021-01-12 RX ORDER — LORAZEPAM 0.5 MG/1
0.5 TABLET ORAL EVERY 8 HOURS PRN
Qty: 30 TABLET | Refills: 1
Start: 2021-01-12 | End: 2021-01-14

## 2021-01-12 NOTE — PROGRESS NOTES
RENAL FOLLOW UP NOTE: td    ASSESSMENT AND PLAN:  1   CKD stage 3 :  · Etiology:  Right radical nephrectomy July 15, 2016 for renal cell CA/hypertensive nephrosclerosis/arteriolar nephrosclerosis/diabetic nephropathy/?  Morbid obesity with? FSGS  · Baseline creatinine:  new baseline appears to be from 1 8-as high as 2 4  · Current creatinine:  1 69 at baseline  · Urine protein creatinine ratio:  0 36 g at goal  Recommendations:  · Treat hypertension-please see below  · Treat dyslipidemia-please see below  · Maintain proteinuria less than 1 g or as low as possible  · Avoid nephrotoxic agents such as NSAIDs, patient counseled as such  2   Volume:  · Current volume:  Euvolemic  · Treatment:  · Only use as needed furosemide if swelling recurs  3  Hypertension:   No blood pressure readings at this time but typically runs     · Goal blood pressure:  Less than 130/80  Recommendations:  ·   push nonmedical regimen including weight loss, and any form of exercise patient can not tolerate; avoidance of salt; patient counseled as such  · Medication changes today:  · In the 120/70s  No changes pending 1 week a home readings  4  Electrolytes:  Chronic metabolic alkalosis most likely from primary CO2 retention,, doing well with a bicarbonate of 31  5  Mineral bone disorder:  Secondary to CKD:  · Calcium/magnesium/phosphorus:  All acceptable  · PTH intact:  73 at goal  · Vitamin-D:  47 at goal  6  Dyslipidemia:  · Goal LDL:  Less than 100  · Current lipid profile:  None at this time     Recommendations:  TO OBTAIN LIPID PROFILE  7  Anemia:  Current improved and doing well at 11 1   -iron studies:  Saturation 25% ferritin 68 but cannot tolerate oral iron because of GI side effects  -B12 and folate deficiency being treated  -multiple myeloma was ruled out in January  -GI evaluation in January of 2018:   colonoscopy demonstrated internal/external hemorrhoids; also colonic polyps which were removed    There was a suboptimal colonic prep  Felt she may require repeat colonoscopy in 3-6 months  EGD demonstrated small hiatal hernia  Recommendations:  -GI follow-up with Dr Kayla Liz  Other problems:  · Right radical nephrectomy for renal cell CA July 15, 2016  · 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 2, 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  · Morbid obesity  · Atrial fibrillation  · Cellulitis of the left leg back in January treated with intravenous antibiotics  · COPD on iron  · EGD involving multiple joints  · Hypothyroidism on supplement  · History of gait dysfunction using motorized scooter to ambulate  · Diabetes mellitus/diabetic neuropathy         PATIENT INSTRUCTIONS:    Patient Instructions   1  Medication changes today:   No medication changes today       2  Please take 1 week a blood pressure readings  at this time     AS FOLLOWS  MORNING AND EVENING, SITTING  as follows:  · TAKE THE MORNING READINGS BEFORE ANY MEDICATIONS AND WHEN YOU ARE RELAXED FOR SEVERAL MINUTES  · TAKE THE EVENING READINGS:  BETWEEN 7-10 P M ; PRIOR TO ANY MEDICATIONS; AT LEAST IN OUR  FROM DINNER; AND CERTAINLY AFTER RELAXING FOR A FEW MINUTES  · PLEASE INCLUDE HEART RATE WITH YOUR BLOOD PRESSURE READINGS  · When taking standing readings, keep your arm supported at heart level and not dangling  · Make sure you are sitting with your back supported and feet on the ground and do not cross your legs or feet  · Make sure you have not taken any coffee or caffeine products or exercised or smoke cigarettes at least 30 minutes before taking your blood pressure  Then please mail these readings into the office    3  Follow-up in 4  months   Please bring in 1 week a blood pressure readings morning evening, sitting and standing is outlined above   PLEASE BRING IN YOUR BLOOD PRESSURE MACHINE FOR CORRELATION WITH THE OFFICE MACHINE AT THE NEXT VISIT   Please go for fasting lab work 1-2 weeks prior to your appointment      4   General instructions:   AVOID SALT BUT NOT ADDING AN READING LABELS TO MAKE SURE THERE IS LOW-SALT IN THE FOOD THAT YOU ARE EATING     Avoid nonsteroidal anti-inflammatory drugs such as Naprosyn, ibuprofen, Aleve, Advil, Celebrex, Meloxicam (Mobic) etc   You can use Tylenol as needed if you do not have any liver condition to be concerned about     Avoid medications such as Sudafed or decongestants and antihistamines that contained the D component which is the decongestant  You can take antihistamines without the decongestant or D component   Try to avoid medications such as pantoprazole or  Protonix/Nexium or Esomeprazole)/Prilosec or omeprazole/Prevacid or lansoprazole/AcipHex or Rabeprazole  If you are able to, use Pepcid as this is safer for your kidneys   Try to exercise at least 30 minutes 3 days a week to begin with with an ultimate goal of 5 days a week for at least 30 minutes     Try to lose 5-10 lb by your next visit     Please do not drink more than 2 glasses of alcohol/wine on a daily basis as this may contribute to your high blood pressure  Subjective: There has been no hospitalizations or acute illnesses since last visit  Patient did have some nausea and vomiting with some of the anxiety medications but those risks which then she has been feeling better  The patient overall is feeling well  No fevers, chills, or cough or colds    Good appetite and good energy  No hematuria, dysuria, voiding symptoms or foamy urine  No gastrointestinal symptoms  No chest pain, some mild dyspnea on exertion unchanged; leg swelling is excellent  No headaches, dizziness or lightheadedness  Blood pressure medications:  · Toprol XL 25 mg daily in the am    ROS:  See HPI, otherwise review of systems as completely reviewed with the patient are negative    Past Medical History:   Diagnosis Date    Anemia     Arthritis     [facility] [address] [facility]     Chronic kidney disease     Diabetes mellitus [facility] [address] [facility]  Disease of thyroid gland     HLD (hyperlipidemia)     Hypertension     [facility]var[facility] cancer [facility] [address] [facility]     Pneumonia      Past Surgical History:   Procedure Laterality Date    GALLBLADDER SURGERY  05/01/2004    HYSTERECTOMY  06/01/2018    NEPHRECTOMY Right 08/02/2018     Family History   Problem Relation Age of Onset    No Known Problems Mother     No Known Problems Father       reports that she quit smoking about 10 years ago  She has a 90 00 pack-year smoking history  She quit smokeless tobacco use about 9 years ago  She reports current drug use  Drug: Marijuana  She reports that she does not drink alcohol  I COMPLETELY REVIEWED THE PAST MEDICAL HISTORY/PAST SURGICAL HISTORY/SOCIAL HISTORY/FAMILY HISTORY/AND MEDICATIONS  AND UPDATED ALL    Objective:     Vitals:   BP sitting on right:  130/72 with a rate of 80 and irregular    Weight (last 2 days)     Date/Time   Weight    01/19/21 1350   (!) 138 (304)            Wt Readings from Last 3 Encounters:   01/19/21 (!) 138 kg (304 lb)   12/22/20 (!) 137 kg (302 lb)   10/02/20 127 kg (280 lb 13 9 oz)       Body mass index is 57 44 kg/m²      Physical Exam: General:  Morbidly obese,No acute distress  Skin:  No acute rash  Eyes:  No scleral icterus, noninjected, no discharge from eyes  ENT:  Moist mucous membranes  Neck:  Supple, no jugular venous distention, trachea is midline, no lymphadenopathy and no thyromegaly  Back   No CVAT  Chest:  Clear to auscultation and percussion, good respiratory effort  CVS:  Irregular rhythm without a rub, or gallops or murmurs  Abdomen:  Obese,Soft and nontender with normal bowel sounds  Extremities:  No cyanosis and no edema, no arthritic changes, normal range of motion  Neuro:  Grossly intact  Psych:  Alert, oriented x3 and appropriate      Medications:    Current Outpatient Medications:     albuterol (2 5 mg/3 mL) 0 083 % nebulizer solution, Take 1 vial (2 5 mg total) by nebulization every 6 (six) hours as needed for wheezing or shortness of breath, Disp: 18 vial, Rfl: 5    atorvastatin (LIPITOR) 10 mg tablet, Take 1 tablet (10 mg total) by mouth daily with dinner, Disp: 90 tablet, Rfl: 1    cholecalciferol (VITAMIN D3) 400 units tablet, take 1 tablet by mouth daily, Disp: 90 tablet, Rfl: 1    famotidine (PEPCID) 20 mg tablet, Take 1 tablet (20 mg total) by mouth 2 (two) times a day, Disp: 180 tablet, Rfl: 0    ipratropium (ATROVENT) 0 02 % nebulizer solution, INHALE CONTENTS OF 1 VIAL IN NEBULIZER FOUR TIMES A DAY, Disp: 75 mL, Rfl: 5    iron polysaccharides (FERREX) 150 mg capsule, Take 1 capsule (150 mg total) by mouth daily, Disp:  , Rfl: 0    levothyroxine 100 mcg tablet, Take 1 tablet (100 mcg total) by mouth daily for 15 days, Disp: 15 tablet, Rfl: 0    LORazepam (ATIVAN) 0 5 mg tablet, take 1 tablet by mouth every 8 hours if needed for anxiety, Disp: 30 tablet, Rfl: 1    metoprolol succinate (TOPROL-XL) 25 mg 24 hr tablet, Take 1 tablet (25 mg total) by mouth daily, Disp: 90 tablet, Rfl: 3    vitamin B-12 (VITAMIN B-12) 500 mcg tablet, Take 1 tablet (500 mcg total) by mouth daily, Disp: 90 tablet, Rfl: 3    warfarin (COUMADIN) 1 mg tablet, take 1 tablet by mouth once daily, Disp: 30 tablet, Rfl: 5    escitalopram (LEXAPRO) 10 mg tablet, Take 1 tablet (10 mg total) by mouth daily Take half at 5mg for 1 week and then take whole pill (Patient not taking: Reported on 1/19/2021), Disp: 90 tablet, Rfl: 0    famotidine (PEPCID) 20 mg tablet, Take 1 tablet (20 mg total) by mouth 2 (two) times a day, Disp: 90 tablet, Rfl: 1    fluticasone-salmeterol (ADVAIR, WIXELA) 250-50 mcg/dose inhaler, Inhale 1 puff 2 (two) times a day Rinse mouth after use , Disp: , Rfl:     folic acid (FOLVITE) 1 mg tablet, Take 1,000 mcg by mouth daily, Disp: , Rfl: 0    furosemide (LASIX) 40 mg tablet, Take 1 tablet (40 mg total) by mouth daily Hold Lasix till 10/6/20, Check Blood work BMP on 10/6/20   (Patient not taking: Reported on 1/19/2021), Disp: , Rfl: 0    warfarin (COUMADIN) 3 mg tablet, Take 1 tablet (3 mg total) by mouth daily, Disp: 30 tablet, Rfl: 0    Lab, Imaging and other studies: I have personally reviewed pertinent labs  Laboratory Results:  Results for orders placed or performed in visit on 01/05/21   Protime-INR   Result Value Ref Range    INR 5 2 (H)     Prothrombin Time 48 8 (H) 9 0 - 11 5 sec             Invalid input(s): ALBUMIN      Radiology review:   chest X-ray    Ultrasound      Portions of the record may have been created with voice recognition software  Occasional wrong word or "sound a like" substitutions may have occurred due to the inherent limitations of voice recognition software  Read the chart carefully and recognize, using context, where substitutions have occurred

## 2021-01-13 ENCOUNTER — TELEPHONE (OUTPATIENT)
Dept: FAMILY MEDICINE CLINIC | Facility: CLINIC | Age: 61
End: 2021-01-13

## 2021-01-13 DIAGNOSIS — F41.0 PANIC ATTACK: ICD-10-CM

## 2021-01-14 RX ORDER — LORAZEPAM 0.5 MG/1
TABLET ORAL
Qty: 30 TABLET | Refills: 1 | Status: SHIPPED | OUTPATIENT
Start: 2021-01-14 | End: 2021-02-26 | Stop reason: SDUPTHER

## 2021-01-19 ENCOUNTER — OFFICE VISIT (OUTPATIENT)
Dept: NEPHROLOGY | Facility: CLINIC | Age: 61
End: 2021-01-19
Payer: MEDICARE

## 2021-01-19 ENCOUNTER — TELEPHONE (OUTPATIENT)
Dept: NEPHROLOGY | Facility: CLINIC | Age: 61
End: 2021-01-19

## 2021-01-19 VITALS — WEIGHT: 293 LBS | HEIGHT: 61 IN | BODY MASS INDEX: 55.32 KG/M2

## 2021-01-19 DIAGNOSIS — D63.1 ANEMIA DUE TO STAGE 3B CHRONIC KIDNEY DISEASE (HCC): ICD-10-CM

## 2021-01-19 DIAGNOSIS — I12.9 HYPERTENSIVE CHRONIC KIDNEY DISEASE WITH STAGE 1 THROUGH STAGE 4 CHRONIC KIDNEY DISEASE, OR UNSPECIFIED CHRONIC KIDNEY DISEASE: Primary | ICD-10-CM

## 2021-01-19 DIAGNOSIS — N18.32 STAGE 3B CHRONIC KIDNEY DISEASE (HCC): ICD-10-CM

## 2021-01-19 DIAGNOSIS — R80.1 PERSISTENT PROTEINURIA: ICD-10-CM

## 2021-01-19 DIAGNOSIS — E61.1 IRON DEFICIENCY: ICD-10-CM

## 2021-01-19 DIAGNOSIS — N25.81 SECONDARY HYPERPARATHYROIDISM OF RENAL ORIGIN (HCC): ICD-10-CM

## 2021-01-19 DIAGNOSIS — N18.32 ANEMIA DUE TO STAGE 3B CHRONIC KIDNEY DISEASE (HCC): ICD-10-CM

## 2021-01-19 DIAGNOSIS — E78.5 DYSLIPIDEMIA: ICD-10-CM

## 2021-01-19 PROCEDURE — 99214 OFFICE O/P EST MOD 30 MIN: CPT | Performed by: INTERNAL MEDICINE

## 2021-01-19 NOTE — TELEPHONE ENCOUNTER

## 2021-01-19 NOTE — LETTER
January 19, 2021     Benji Ayala, [address] [address]    Patient: Karen Santos   YOB: 1960   Date of Visit: 1/19/2021       Dear Dr Deshawn Gillette:    Thank you for referring Ramya Petersen to me for evaluation  Below are my notes for this consultation  If you have questions, please do not hesitate to call me  I look forward to following your patient along with you  Sincerely,        Jareth Wagner MD        CC: No Recipients  Jareth Wagner MD  1/19/2021  2:15 PM  Sign when Signing Visit  RENAL FOLLOW UP NOTE: td    ASSESSMENT AND PLAN:  1   CKD stage 3 :  · Etiology:  Right radical nephrectomy July 15, 2016 for renal cell CA/hypertensive nephrosclerosis/arteriolar nephrosclerosis/diabetic nephropathy/?  Morbid obesity with? FSGS  · Baseline creatinine:  new baseline appears to be from 1 8-as high as 2 4  · Current creatinine:  1 69 at baseline  · Urine protein creatinine ratio:  0 36 g at goal  Recommendations:  · Treat hypertension-please see below  · Treat dyslipidemia-please see below  · Maintain proteinuria less than 1 g or as low as possible  · Avoid nephrotoxic agents such as NSAIDs, patient counseled as such  2   Volume:  · Current volume:  Euvolemic  · Treatment:  · Only use as needed furosemide if swelling recurs  3  Hypertension:   No blood pressure readings at this time but typically runs     · Goal blood pressure:  Less than 130/80  Recommendations:  ·   push nonmedical regimen including weight loss, and any form of exercise patient can not tolerate; avoidance of salt; patient counseled as such  · Medication changes today:  · In the 120/70s  No changes pending 1 week a home readings  4  Electrolytes:  Chronic metabolic alkalosis most likely from primary CO2 retention,, doing well with a bicarbonate of 31  5    Mineral bone disorder:  Secondary to CKD:  · Calcium/magnesium/phosphorus:  All acceptable  · PTH intact:  73 at goal  · Vitamin-D:  47 at goal  6  Dyslipidemia:  · Goal LDL:  Less than 100  · Current lipid profile:  None at this time     Recommendations:  TO OBTAIN LIPID PROFILE  7  Anemia:  Current improved and doing well at 11 1   -iron studies:  Saturation 25% ferritin 68 but cannot tolerate oral iron because of GI side effects  -B12 and folate deficiency being treated  -multiple myeloma was ruled out in January  -GI evaluation in January of 2018:   colonoscopy demonstrated internal/external hemorrhoids; also colonic polyps which were removed  There was a suboptimal colonic prep  Felt she may require repeat colonoscopy in 3-6 months  EGD demonstrated small hiatal hernia  Recommendations:  -GI follow-up with Dr Garza    8  Other problems:  · Right radical nephrectomy for renal cell CA July 15, 2016  · 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 2, 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  · Morbid obesity  · Atrial fibrillation  · Cellulitis of the left leg back in January treated with intravenous antibiotics  · COPD on iron  · EGD involving multiple joints  · Hypothyroidism on supplement  · History of gait dysfunction using motorized scooter to ambulate  · Diabetes mellitus/diabetic neuropathy         PATIENT INSTRUCTIONS:    Patient Instructions   1  Medication changes today:   No medication changes today       2   Please take 1 week a blood pressure readings  at this time     AS FOLLOWS  MORNING AND EVENING, SITTING  as follows:  · TAKE THE MORNING READINGS BEFORE ANY MEDICATIONS AND WHEN YOU ARE RELAXED FOR SEVERAL MINUTES  · TAKE THE EVENING READINGS:  BETWEEN 7-10 P M ; PRIOR TO ANY MEDICATIONS; AT LEAST IN OUR  FROM DINNER; AND CERTAINLY AFTER RELAXING FOR A FEW MINUTES  · PLEASE INCLUDE HEART RATE WITH YOUR BLOOD PRESSURE READINGS  · When taking standing readings, keep your arm supported at heart level and not dangling  · Make sure you are sitting with your back supported and feet on the ground and do not cross your legs or feet  · Make sure you have not taken any coffee or caffeine products or exercised or smoke cigarettes at least 30 minutes before taking your blood pressure  Then please mail these readings into the office    3  Follow-up in 4  months   Please bring in 1 week a blood pressure readings morning evening, sitting and standing is outlined above   PLEASE BRING IN YOUR BLOOD PRESSURE MACHINE FOR CORRELATION WITH THE OFFICE MACHINE AT THE NEXT VISIT   Please go for fasting lab work 1-2 weeks prior to your appointment      4  General instructions:   AVOID SALT BUT NOT ADDING AN READING LABELS TO MAKE SURE THERE IS LOW-SALT IN THE FOOD THAT YOU ARE EATING     Avoid nonsteroidal anti-inflammatory drugs such as Naprosyn, ibuprofen, Aleve, Advil, Celebrex, Meloxicam (Mobic) etc   You can use Tylenol as needed if you do not have any liver condition to be concerned about     Avoid medications such as Sudafed or decongestants and antihistamines that contained the D component which is the decongestant  You can take antihistamines without the decongestant or D component   Try to avoid medications such as pantoprazole or  Protonix/Nexium or Esomeprazole)/Prilosec or omeprazole/Prevacid or lansoprazole/AcipHex or Rabeprazole  If you are able to, use Pepcid as this is safer for your kidneys   Try to exercise at least 30 minutes 3 days a week to begin with with an ultimate goal of 5 days a week for at least 30 minutes     Try to lose 5-10 lb by your next visit     Please do not drink more than 2 glasses of alcohol/wine on a daily basis as this may contribute to your high blood pressure  Subjective: There has been no hospitalizations or acute illnesses since last visit  Patient did have some nausea and vomiting with some of the anxiety medications but those risks which then she has been feeling better  The patient overall is feeling well    No fevers, chills, or cough or colds   Good appetite and good energy  No hematuria, dysuria, voiding symptoms or foamy urine  No gastrointestinal symptoms  No chest pain, some mild dyspnea on exertion unchanged; leg swelling is excellent  No headaches, dizziness or lightheadedness  Blood pressure medications:  · Toprol XL 25 mg daily in the am    ROS:  See HPI, otherwise review of systems as completely reviewed with the patient are negative    Past Medical History:   Diagnosis Date    Anemia     Arthritis     [facility]     Chronic kidney disease     [facility]     Disease of thyroid gland     HLD (hyperlipidemia)     Hypertension     [facility] ([facility]     Pneumonia      Past Surgical History:   Procedure Laterality Date    GALLBLADDER SURGERY  05/01/2004    HYSTERECTOMY  06/01/2018    NEPHRECTOMY Right 08/02/2018     Family History   Problem Relation Age of Onset    No Known Problems Mother     No Known Problems Father       reports that she quit smoking about 10 years ago  She has a 90 00 pack-year smoking history  She quit smokeless tobacco use about 9 years ago  She reports current drug use  Drug: Marijuana  She reports that she does not drink alcohol  I COMPLETELY REVIEWED THE PAST MEDICAL HISTORY/PAST SURGICAL HISTORY/SOCIAL HISTORY/FAMILY HISTORY/AND MEDICATIONS  AND UPDATED ALL    Objective:     Vitals:   BP sitting on right:  130/72 with a rate of 80 and irregular    Weight (last 2 days)     Date/Time   Weight    01/19/21 1350   (!) 138 (304)            Wt Readings from Last 3 Encounters:   01/19/21 (!) 138 kg (304 lb)   12/22/20 (!) 137 kg (302 lb)   10/02/20 127 kg (280 lb 13 9 oz)       Body mass index is 57 44 kg/m²      Physical Exam: General:  Morbidly obese,No acute distress  Skin:  No acute rash  Eyes:  No scleral icterus, noninjected, no discharge from eyes  ENT:  Moist mucous membranes  Neck:  Supple, no jugular venous distention, trachea is midline, no lymphadenopathy and no thyromegaly  Back   No CVAT  Chest:  Clear to auscultation and percussion, good respiratory effort  CVS:  Irregular rhythm without a rub, or gallops or murmurs  Abdomen:  Obese,Soft and nontender with normal bowel sounds  Extremities:  No cyanosis and no edema, no arthritic changes, normal range of motion  Neuro:  Grossly intact  Psych:  Alert, oriented x3 and appropriate      Medications:    Current Outpatient Medications:     albuterol (2 5 mg/3 mL) 0 083 % nebulizer solution, Take 1 vial (2 5 mg total) by nebulization every 6 (six) hours as needed for wheezing or shortness of breath, Disp: 18 vial, Rfl: 5    atorvastatin (LIPITOR) 10 mg tablet, Take 1 tablet (10 mg total) by mouth daily with dinner, Disp: 90 tablet, Rfl: 1    cholecalciferol (VITAMIN D3) 400 units tablet, take 1 tablet by mouth daily, Disp: 90 tablet, Rfl: 1    famotidine (PEPCID) 20 mg tablet, Take 1 tablet (20 mg total) by mouth 2 (two) times a day, Disp: 180 tablet, Rfl: 0    ipratropium (ATROVENT) 0 02 % nebulizer solution, INHALE CONTENTS OF 1 VIAL IN NEBULIZER FOUR TIMES A DAY, Disp: 75 mL, Rfl: 5    iron polysaccharides (FERREX) 150 mg capsule, Take 1 capsule (150 mg total) by mouth daily, Disp:  , Rfl: 0    levothyroxine 100 mcg tablet, Take 1 tablet (100 mcg total) by mouth daily for 15 days, Disp: 15 tablet, Rfl: 0    LORazepam (ATIVAN) 0 5 mg tablet, take 1 tablet by mouth every 8 hours if needed for anxiety, Disp: 30 tablet, Rfl: 1    metoprolol succinate (TOPROL-XL) 25 mg 24 hr tablet, Take 1 tablet (25 mg total) by mouth daily, Disp: 90 tablet, Rfl: 3    vitamin B-12 (VITAMIN B-12) 500 mcg tablet, Take 1 tablet (500 mcg total) by mouth daily, Disp: 90 tablet, Rfl: 3    warfarin (COUMADIN) 1 mg tablet, take 1 tablet by mouth once daily, Disp: 30 tablet, Rfl: 5    escitalopram (LEXAPRO) 10 mg tablet, Take 1 tablet (10 mg total) by mouth daily Take half at 5mg for 1 week and then take whole pill (Patient not taking: Reported on 1/19/2021), Disp: 90 tablet, Rfl: 0    famotidine (PEPCID) 20 mg tablet, Take 1 tablet (20 mg total) by mouth 2 (two) times a day, Disp: 90 tablet, Rfl: 1    fluticasone-salmeterol (ADVAIR, WIXELA) 250-50 mcg/dose inhaler, Inhale 1 puff 2 (two) times a day Rinse mouth after use , Disp: , Rfl:     folic acid (FOLVITE) 1 mg tablet, Take 1,000 mcg by mouth daily, Disp: , Rfl: 0    furosemide (LASIX) 40 mg tablet, Take 1 tablet (40 mg total) by mouth daily Hold Lasix till 10/6/20, Check Blood work BMP on 10/6/20  (Patient not taking: Reported on 1/19/2021), Disp: , Rfl: 0    warfarin (COUMADIN) 3 mg tablet, Take 1 tablet (3 mg total) by mouth daily, Disp: 30 tablet, Rfl: 0    Lab, Imaging and other studies: I have personally reviewed pertinent labs  Laboratory Results:  Results for orders placed or performed in visit on 01/05/21   Protime-INR   Result Value Ref Range    INR 5 2 (H)     Prothrombin Time 48 8 (H) 9 0 - 11 5 sec             Invalid input(s): ALBUMIN      Radiology review:   chest X-ray    Ultrasound      Portions of the record may have been created with voice recognition software  Occasional wrong word or "sound a like" substitutions may have occurred due to the inherent limitations of voice recognition software  Read the chart carefully and recognize, using context, where substitutions have occurred

## 2021-01-19 NOTE — PATIENT INSTRUCTIONS
1  Medication changes today:   No medication changes today       2  Please take 1 week a blood pressure readings  at this time     AS FOLLOWS  MORNING AND EVENING, SITTING  as follows:  · TAKE THE MORNING READINGS BEFORE ANY MEDICATIONS AND WHEN YOU ARE RELAXED FOR SEVERAL MINUTES  · TAKE THE EVENING READINGS:  BETWEEN 7-10 P M ; PRIOR TO ANY MEDICATIONS; AT LEAST IN OUR  FROM DINNER; AND CERTAINLY AFTER RELAXING FOR A FEW MINUTES  · PLEASE INCLUDE HEART RATE WITH YOUR BLOOD PRESSURE READINGS  · When taking standing readings, keep your arm supported at heart level and not dangling  · Make sure you are sitting with your back supported and feet on the ground and do not cross your legs or feet  · Make sure you have not taken any coffee or caffeine products or exercised or smoke cigarettes at least 30 minutes before taking your blood pressure  Then please mail these readings into the office    3  Follow-up in 4  months   Please bring in 1 week a blood pressure readings morning evening, sitting and standing is outlined above   PLEASE BRING IN YOUR BLOOD PRESSURE MACHINE FOR CORRELATION WITH THE OFFICE MACHINE AT THE NEXT VISIT   Please go for fasting lab work 1-2 weeks prior to your appointment      4  General instructions:   AVOID SALT BUT NOT ADDING AN READING LABELS TO MAKE SURE THERE IS LOW-SALT IN THE FOOD THAT YOU ARE EATING     Avoid nonsteroidal anti-inflammatory drugs such as Naprosyn, ibuprofen, Aleve, Advil, Celebrex, Meloxicam (Mobic) etc   You can use Tylenol as needed if you do not have any liver condition to be concerned about     Avoid medications such as Sudafed or decongestants and antihistamines that contained the D component which is the decongestant  You can take antihistamines without the decongestant or D component   Try to avoid medications such as pantoprazole or  Protonix/Nexium or Esomeprazole)/Prilosec or omeprazole/Prevacid or lansoprazole/AcipHex or Rabeprazole    If you are able to, use Pepcid as this is safer for your kidneys   Try to exercise at least 30 minutes 3 days a week to begin with with an ultimate goal of 5 days a week for at least 30 minutes     Try to lose 5-10 lb by your next visit     Please do not drink more than 2 glasses of alcohol/wine on a daily basis as this may contribute to your high blood pressure

## 2021-01-20 LAB
INR PPP: 1.2
PROTHROMBIN TIME: 12.6 SEC (ref 9–11.5)

## 2021-01-21 ENCOUNTER — ANTICOAG VISIT (OUTPATIENT)
Dept: FAMILY MEDICINE CLINIC | Facility: CLINIC | Age: 61
End: 2021-01-21

## 2021-01-26 DIAGNOSIS — J44.9 CHRONIC OBSTRUCTIVE PULMONARY DISEASE, UNSPECIFIED COPD TYPE (HCC): ICD-10-CM

## 2021-01-26 RX ORDER — ALBUTEROL SULFATE 2.5 MG/3ML
2.5 SOLUTION RESPIRATORY (INHALATION) EVERY 6 HOURS PRN
Qty: 18 VIAL | Refills: 5 | Status: SHIPPED | OUTPATIENT
Start: 2021-01-26 | End: 2021-02-08

## 2021-01-26 NOTE — TELEPHONE ENCOUNTER
Patient needs refill on albuteral solfate 2 5 -3 ml  And ipratromiumdromide 0 02%  0 5 mg    For her nebulizer machine  [facility]

## 2021-02-04 LAB
INR PPP: 1.5
PROTHROMBIN TIME: 15.3 SEC (ref 9–11.5)

## 2021-02-05 DIAGNOSIS — J44.9 CHRONIC OBSTRUCTIVE PULMONARY DISEASE, UNSPECIFIED COPD TYPE (HCC): ICD-10-CM

## 2021-02-08 RX ORDER — ALBUTEROL SULFATE 2.5 MG/3ML
SOLUTION RESPIRATORY (INHALATION)
Qty: 75 ML | Refills: 5 | Status: SHIPPED | OUTPATIENT
Start: 2021-02-08

## 2021-02-11 ENCOUNTER — ANTICOAG VISIT (OUTPATIENT)
Dept: FAMILY MEDICINE CLINIC | Facility: CLINIC | Age: 61
End: 2021-02-11

## 2021-02-17 ENCOUNTER — TELEPHONE (OUTPATIENT)
Dept: FAMILY MEDICINE CLINIC | Facility: CLINIC | Age: 61
End: 2021-02-17

## 2021-02-17 NOTE — TELEPHONE ENCOUNTER
LM on FD VM @ 4:59 pm    The DWO that you faxed back is incomplete    It is missing the date of the last Face To Face visit in the top section    Please correct and resend

## 2021-02-23 ENCOUNTER — ANTICOAG VISIT (OUTPATIENT)
Dept: FAMILY MEDICINE CLINIC | Facility: CLINIC | Age: 61
End: 2021-02-23

## 2021-02-23 LAB
INR PPP: 1.3
PROTHROMBIN TIME: 13.2 SEC (ref 9–11.5)

## 2021-02-23 NOTE — PROGRESS NOTES
As per Dr Elzbieta Hernandez, take 5mg today, alternate 4mg, 3 mg, repeat INR in two weeks, spoke with pt

## 2021-02-26 DIAGNOSIS — F41.0 PANIC ATTACK: ICD-10-CM

## 2021-02-26 RX ORDER — LORAZEPAM 0.5 MG/1
0.5 TABLET ORAL EVERY 8 HOURS PRN
Qty: 30 TABLET | Refills: 1 | Status: SHIPPED | OUTPATIENT
Start: 2021-02-26 | End: 2021-05-07 | Stop reason: SDUPTHER

## 2021-03-07 DIAGNOSIS — N18.32 STAGE 3B CHRONIC KIDNEY DISEASE (HCC): ICD-10-CM

## 2021-03-07 RX ORDER — OMEGA-3S/DHA/EPA/FISH OIL/D3 300MG-1000
CAPSULE ORAL
Qty: 90 TABLET | Refills: 1 | Status: SHIPPED | OUTPATIENT
Start: 2021-03-07 | End: 2021-03-23 | Stop reason: SDUPTHER

## 2021-03-10 LAB
INR PPP: 2.1
PROTHROMBIN TIME: 20.9 SEC (ref 9–11.5)

## 2021-03-23 ENCOUNTER — OFFICE VISIT (OUTPATIENT)
Dept: FAMILY MEDICINE CLINIC | Facility: CLINIC | Age: 61
End: 2021-03-23
Payer: MEDICARE

## 2021-03-23 ENCOUNTER — ANTICOAG VISIT (OUTPATIENT)
Dept: FAMILY MEDICINE CLINIC | Facility: CLINIC | Age: 61
End: 2021-03-23

## 2021-03-23 VITALS
BODY MASS INDEX: 55.32 KG/M2 | SYSTOLIC BLOOD PRESSURE: 120 MMHG | OXYGEN SATURATION: 95 % | WEIGHT: 293 LBS | TEMPERATURE: 98.1 F | HEART RATE: 92 BPM | DIASTOLIC BLOOD PRESSURE: 64 MMHG | RESPIRATION RATE: 20 BRPM | HEIGHT: 61 IN

## 2021-03-23 DIAGNOSIS — I48.91 ATRIAL FIBRILLATION, UNSPECIFIED TYPE (HCC): ICD-10-CM

## 2021-03-23 DIAGNOSIS — I10 ESSENTIAL HYPERTENSION: ICD-10-CM

## 2021-03-23 DIAGNOSIS — K21.9 GASTROESOPHAGEAL REFLUX DISEASE WITHOUT ESOPHAGITIS: ICD-10-CM

## 2021-03-23 DIAGNOSIS — E78.5 DYSLIPIDEMIA: Primary | ICD-10-CM

## 2021-03-23 DIAGNOSIS — E03.8 OTHER SPECIFIED HYPOTHYROIDISM: ICD-10-CM

## 2021-03-23 DIAGNOSIS — Z00.00 MEDICARE ANNUAL WELLNESS VISIT, INITIAL: ICD-10-CM

## 2021-03-23 DIAGNOSIS — N18.32 STAGE 3B CHRONIC KIDNEY DISEASE (HCC): ICD-10-CM

## 2021-03-23 DIAGNOSIS — E11.9 TYPE 2 DIABETES MELLITUS WITHOUT COMPLICATION, WITHOUT LONG-TERM CURRENT USE OF INSULIN (HCC): ICD-10-CM

## 2021-03-23 DIAGNOSIS — E11.49 OTHER DIABETIC NEUROLOGICAL COMPLICATION ASSOCIATED WITH TYPE 2 DIABETES MELLITUS (HCC): ICD-10-CM

## 2021-03-23 LAB — SL AMB POCT HEMOGLOBIN AIC: 6.3 (ref ?–6.5)

## 2021-03-23 PROCEDURE — 99214 OFFICE O/P EST MOD 30 MIN: CPT | Performed by: INTERNAL MEDICINE

## 2021-03-23 PROCEDURE — 83036 HEMOGLOBIN GLYCOSYLATED A1C: CPT | Performed by: INTERNAL MEDICINE

## 2021-03-23 PROCEDURE — G0438 PPPS, INITIAL VISIT: HCPCS | Performed by: INTERNAL MEDICINE

## 2021-03-23 RX ORDER — DULOXETIN HYDROCHLORIDE 30 MG/1
30 CAPSULE, DELAYED RELEASE ORAL 2 TIMES DAILY
Qty: 180 CAPSULE | Refills: 0 | Status: SHIPPED | OUTPATIENT
Start: 2021-03-23 | End: 2021-07-21

## 2021-03-23 RX ORDER — FAMOTIDINE 20 MG/1
20 TABLET, FILM COATED ORAL 2 TIMES DAILY
Qty: 90 TABLET | Refills: 1 | OUTPATIENT
Start: 2021-03-23 | End: 2021-05-13

## 2021-03-23 RX ORDER — WARFARIN SODIUM 1 MG/1
1 TABLET ORAL DAILY
Qty: 30 TABLET | Refills: 5 | Status: SHIPPED | OUTPATIENT
Start: 2021-03-23 | End: 2021-06-24

## 2021-03-23 RX ORDER — OMEGA-3S/DHA/EPA/FISH OIL/D3 300MG-1000
400 CAPSULE ORAL DAILY
Qty: 90 TABLET | Refills: 1 | Status: SHIPPED | OUTPATIENT
Start: 2021-03-23 | End: 2021-08-19 | Stop reason: SDUPTHER

## 2021-03-23 RX ORDER — ATORVASTATIN CALCIUM 10 MG/1
10 TABLET, FILM COATED ORAL
Qty: 90 TABLET | Refills: 1
Start: 2021-03-23 | End: 2021-05-07 | Stop reason: SDUPTHER

## 2021-03-23 RX ORDER — LEVOTHYROXINE SODIUM 0.1 MG/1
100 TABLET ORAL DAILY
Qty: 90 TABLET | Refills: 0 | Status: SHIPPED | OUTPATIENT
Start: 2021-03-23 | End: 2021-08-12 | Stop reason: HOSPADM

## 2021-03-23 RX ORDER — METOPROLOL SUCCINATE 25 MG/1
25 TABLET, EXTENDED RELEASE ORAL DAILY
Qty: 90 TABLET | Refills: 3 | Status: SHIPPED | OUTPATIENT
Start: 2021-03-23 | End: 2021-08-12 | Stop reason: HOSPADM

## 2021-03-23 RX ORDER — LEVOTHYROXINE SODIUM 0.1 MG/1
100 TABLET ORAL DAILY
COMMUNITY
End: 2021-03-23 | Stop reason: SDUPTHER

## 2021-03-23 RX ORDER — WARFARIN SODIUM 3 MG/1
3 TABLET ORAL
Qty: 90 TABLET | Refills: 0 | Status: SHIPPED | OUTPATIENT
Start: 2021-03-23 | End: 2021-06-24 | Stop reason: SDUPTHER

## 2021-03-23 NOTE — PROGRESS NOTES
Assessment and Plan:     Problem List Items Addressed This Visit        Endocrine    Diabetes mellitus ([facility] ) (Chronic)       Lab Results   Component Value Date    HGBA1C 6 1 (H) 09/30/2020     · Well controlled on current medication   · Will order POCT HbA1c          Relevant Orders    POCT hemoglobin A1c       Other    Dyslipidemia - Primary           Preventive health issues were discussed with patient, and age appropriate screening tests were ordered as noted in patient's After Visit Summary  Personalized health advice and appropriate referrals for health education or preventive services given if needed, as noted in patient's After Visit Summary       History of Present Illness:     Patient presents for Medicare Annual Wellness visit    Patient Care Team:  Danny Johnson MD as PCP - General  Maurilio Dorado MD     Problem List:     Patient Active Problem List   Diagnosis    Anemia due to stage 3b chronic kidney disease    Chronic kidney disease, stage III (moderate)    Hypertensive chronic kidney disease with stage 1 through stage 4 chronic kidney disease, or unspecified chronic kidney disease    Persistent proteinuria    Iron deficiency    Dyslipidemia    [facility] ([facility] )    SOB (shortness of breath)    Cellulitis    H/O right nephrectomy    Severe sepsis ([place] [address] )    Acute renal failure superimposed on chronic kidney disease (HCC)    Paroxysmal atrial fibrillation ([place] [address] )    COPD with asthma ([place] [address] )    Hypothyroid    Chronic constipation    Diabetes mellitus ([place] [address] )    GERD (gastroesophageal reflux disease)    Anxiety    Secondary hyperparathyroidism of renal origin [facility])      Past Medical and Surgical History:     Past Medical History:   Diagnosis Date    Anemia     Arthritis     Cancer of kidney ([facility] )     Chronic kidney disease     Diabetes mellitus ([facility] )     Disease of thyroid gland     HLD (hyperlipidemia)     Hypertension     Ovar[facility] cancer ([facility]  )     Pneumonia      Past Surgical History:   Procedure Laterality Date    GALLBLADDER SURGERY  2004    HYSTERECTOMY  2018    NEPHRECTOMY Right 2018      Family History:     Family History   Problem Relation Age of Onset    No Known Problems Mother     No Known Problems Father       Social History:        Social History     Socioeconomic History    Marital status: Single     Spouse name: Not on file    Number of children: 0    Years of education: Not on file    Highest education level: 12th grade   Occupational History    Not on file   Social Needs    Financial resource strain: Not hard at all   [place] insecurity     Worry: Never true     Inability: Never true    Transportation needs     Medical: No     Non-medical: No   Tobacco Use    Smoking status: Former Smoker     Packs/day: 3 00     Years: 30 00     Pack years: 90 00     Quit date: 10/22/2010     Years since quitting: 10 4    Smokeless tobacco: Former User     Quit date: 2011    Tobacco comment: quit approx   9 years ago   Substance and Sexual Activity    Alcohol use: Never     Frequency: Never     Binge frequency: Never     Comment: n/a    Drug use: Yes     Types: Marijuana     Comment: smokes with girlfriend when anxious    Sexual activity: Yes     Partners: Female   Lifestyle    Physical activity     Days per week: Not on file     Minutes per session: Not on file    Stress: Not at all   Relationships    Social connections     Talks on phone: Once a week     Gets together: Never     Attends L[employer] day service: Never     Active member of club or organization: No     Attends meetings of clubs or organizations: Never     Relationship status: Never     Intimate partner violence     Fear of current or ex partner: No     Emotionally abused: No     Physically abused: No     Forced sexual activity: No   Other Topics Concern    Not on file   Social History Narrative    · Most recent tobacco use screenin2020      · Do you currently or have you served in [employer]:   No      · Were you activated, into active duty, as a member of the [employer] or as a Reservist:   No      ·     Last modified by [email or web address]   02-, 10:39       Medications and Allergies:     Current Outpatient Medications   Medication Sig Dispense Refill    levothyroxine 100 mcg tablet Take 1 tablet (100 mcg total) by mouth daily for 15 days 15 tablet 0    levothyroxine 100 mcg tablet Take 100 mcg by mouth daily      albuterol (2 5 mg/3 mL) 0 083 % nebulizer solution INHALE CONTENTS OF 1 VIAL ( 3 MILLILITERS ) IN NEBULIZER BY MOUTH AND INTO THE LUNGS EVERY 6 HOURS IF NEEDED FOR WHEEZING OR SHORTNESS OF BREATH 75 mL 5    atorvastatin (LIPITOR) 10 mg tablet Take 1 tablet (10 mg total) by mouth daily with dinner 90 tablet 1    cholecalciferol (VITAMIN D3) 400 units tablet take 1 tablet by mouth daily 90 tablet 1    escitalopram (LEXAPRO) 10 mg tablet Take 1 tablet (10 mg total) by mouth daily Take half at 5mg for 1 week and then take whole pill (Patient not taking: Reported on 1/19/2021) 90 tablet 0    famotidine (PEPCID) 20 mg tablet Take 1 tablet (20 mg total) by mouth 2 (two) times a day 90 tablet 1    famotidine (PEPCID) 20 mg tablet Take 1 tablet (20 mg total) by mouth 2 (two) times a day 180 tablet 0    fluticasone-salmeterol (ADVAIR, WIXELA) 250-50 mcg/dose inhaler Inhale 1 puff 2 (two) times a day Rinse mouth after use   folic acid (FOLVITE) 1 mg tablet Take 1,000 mcg by mouth daily  0    furosemide (LASIX) 40 mg tablet Take 1 tablet (40 mg total) by mouth daily Hold Lasix till 10/6/20, Check Blood work BMP on 10/6/20   (Patient not taking: Reported on 1/19/2021)  0    ipratropium (ATROVENT) 0 02 % nebulizer solution Take 1 vial (0 5 mg total) by nebulization 4 (four) times a day 75 mL 5    iron polysaccharides (FERREX) 150 mg capsule Take 1 capsule (150 mg total) by mouth daily  0    LORazepam (ATIVAN) 0 5 mg tablet Take 1 tablet (0 5 mg total) by mouth every 8 (eight) hours as needed for anxiety 30 tablet 1    metoprolol succinate (TOPROL-XL) 25 mg 24 hr tablet Take 1 tablet (25 mg total) by mouth daily 90 tablet 3    vitamin B-12 (VITAMIN B-12) 500 mcg tablet Take 1 tablet (500 mcg total) by mouth daily 90 tablet 3    warfarin (COUMADIN) 1 mg tablet take 1 tablet by mouth once daily 30 tablet 5    warfarin (COUMADIN) 3 mg tablet Take 1 tablet (3 mg total) by mouth daily 30 tablet 0     No current facility-administered medications for this visit  No Known Allergies   Immunizations: There is no immunization history on file for this patient  Health Maintenance:         Topic Date Due    Hepatitis C Screening  Never done    MAMMOGRAM  Never done    HIV Screening  Never done    Cervical Cancer Screening  Never done    Lung Cancer Screening  Never done    Colonoscopy Surveillance  01/27/2019    Colorectal Cancer Screening  01/27/2028         Topic Date Due    COVID-19 Vaccine (1) Never done    DTaP,Tdap,and Td Vaccines (1 - Tdap) Never done    Pneumococcal Vaccine: Pediatrics (0 to 5 Years) and At-Risk Patients (6 to 59 Years) (2 of 3 - PCV13) 03/09/2021      Medicare Health Risk Assessment:     Pulse 92   Temp 98 1 °F (36 7 °C) (Temporal)   Resp 20   Ht 5' 1" (1 549 m)   Wt (!) 140 kg (309 lb)   SpO2 95%   BMI 58 39 kg/m²      Bela Mon is here for her Initial Wellness visit  Health Risk Assessment:   Patient rates overall health as good  Patient feels that their physical health rating is slightly better  Patient is satisfied with their life  Eyesight was rated as slightly worse  Hearing was rated as same  Patient feels that their emotional and mental health rating is slightly better  Patients states they are never, rarely angry  Patient states they are sometimes unusually tired/fatigued  Pain experienced in the last 7 days has been none  Patient states that she has experienced no weight loss or gain in last 6 months  Depression Screening:   PHQ-2 Score: 0      Fall Risk Screening: In the past year, patient has experienced: no history of falling in past year      Home Safety:  Patient has trouble with stairs inside or outside of their home  Patient has working smoke alarms and has working carbon monoxide detector  Home safety hazards include: none  Nutrition:   Current diet is Regular  Medications:   Patient is not currently taking any over-the-counter supplements  Patient is not able to manage medications  Partner takes care of medications    Activities of Daily Living (ADLs)/Instrumental Activities of Daily Living (IADLs):   Walk and transfer into and out of bed and chair?: No  Dress and groom yourself?: No    Bathe or shower yourself?: No    Feed yourself? Yes  Do your laundry/housekeeping?: No  Manage your money, pay your bills and track your expenses?: Yes  Make your own meals?: No    Do your own shopping?: No    Previous Hospitalizations:   Any hospitalizations or ED visits within the last 12 months?: No      Advance Care Planning:   Living will: Yes    Durable POA for healthcare:  Yes    Advanced directive: Yes    Advanced directive counseling given: Yes    Five wishes given: Yes    Patient declined ACP directive: No    End of Life Decisions reviewed with patient: Yes    Provider agrees with end of life decisions: Yes      Cognitive Screening:   Provider or family/friend/caregiver concerned regarding cognition?: No    PREVENTIVE SCREENINGS      Cardiovascular Screening:    General: History Lipid Disorder and Risks and Benefits Discussed      Diabetes Screening:     General: History Diabetes and Screening Current      Colorectal Cancer Screening:     General: Screening Current      Breast Cancer Screening:     General: Patient Declines      Cervical Cancer Screening:    General: Patient Declines      Osteoporosis Screening:    General: Risks and Benefits Discussed      Abdominal Aortic Aneurysm (AAA) Screening:        General: Screening Not Indicated      Lung Cancer Screening:     General: Screening Current      Hepatitis C Screening:    General: Screening Not Indicated    Screening, Brief Intervention, and Referral to Treatment (SBIRT)    Screening  Typical number of drinks in a day: 0    Single Item Drug Screening:  How often have you used an illegal drug (including marijuana) or a prescription medication for non-medical reasons in the past year? less than monthly  What drug(s) or prescription medication(s)? marijuana    Single Item Drug Screen Score: 1  Interpretation: POSITIVE screen for possible drug use disorder    Drug Abuse Screening Test (DAST-10):  1) Have you used drugs other than those required for medical reasons? Yes  2) Do you abuse more than one drug at a time? No  3) Are you always able to stop using drugs when you want to? No  4) Have you had "blackouts" or "flashbacks" as a result of drug use? No  5) Do you ever feel bad or guilty about your drug use? No  6) Does your spouse (or parents) ever complain about your involvement with drugs? No  7) Have you neglected your family because of your use of drugs? No  8) Have you engaged in illegal activities in order to obtain drugs? No  9) Have you ever experienced withdrawal symptoms (felt sick) when you stopped taking drugs? No  10) Have you had medical problems as a result of your drug use (e g , memory loss, hepatitis, convulsions, bleeding, etc )? No    DAST-10 Score: 2  Interpretation: Low level problems related to drug abuse    Brief Intervention  Alcohol & drug use screenings were reviewed  No concerns regarding substance use disorder identified         Clint Tavarez MD

## 2021-03-23 NOTE — ASSESSMENT & PLAN NOTE
Lab Results   Component Value Date    EGFR 25 10/02/2020    EGFR 23 10/01/2020    EGFR 23 09/30/2020    CREATININE 1 69 (H) 01/04/2021    CREATININE 1 76 (H) 10/07/2020    CREATININE 2 11 (H) 10/02/2020     · Follows with nephrologist- Dr Barr Has

## 2021-03-23 NOTE — ASSESSMENT & PLAN NOTE
Lab Results   Component Value Date    HGBA1C 6 1 (H) 09/30/2020     · Not on any medication   · Will order POCT HbA1c

## 2021-03-23 NOTE — PATIENT INSTRUCTIONS
Medicare Preventive Visit Patient Instructions  Thank you for completing your Welcome to Medicare Visit or Medicare Annual Wellness Visit today  Your next wellness visit will be due in one year (3/24/2022)  The screening/preventive services that you may require over the next 5-10 years are detailed below  Some tests may not apply to you based off risk factors and/or age  Screening tests ordered at today's visit but not completed yet may show as past due  Also, please note that scanned in results may not display below  Preventive Screenings:  Service Recommendations Previous Testing/Comments   Colorectal Cancer Screening  * Colonoscopy    * Fecal Occult Blood Test (FOBT)/Fecal Immunochemical Test (FIT)  * Fecal DNA/Cologuard Test  * Flexible Sigmoidoscopy Age: 54-65 years old   Colonoscopy: every 10 years (may be performed more frequently if at higher risk)  OR  FOBT/FIT: every 1 year  OR  Cologuard: every 3 years  OR  Sigmoidoscopy: every 5 years  Screening may be recommended earlier than age 48 if at higher risk for colorectal cancer  Also, an individualized decision between you and your healthcare provider will decide whether screening between the ages of 74-80 would be appropriate  Colonoscopy: 01/27/2018  FOBT/FIT: 11/11/2019  Cologuard: Not on file  Sigmoidoscopy: Not on file    Screening Current     Breast Cancer Screening Age: 36 years old  Frequency: every 1-2 years  Not required if history of left and right mastectomy Mammogram: Not on file    Patient Declines   Cervical Cancer Screening Between the ages of 21-29, pap smear recommended once every 3 years  Between the ages of 33-67, can perform pap smear with HPV co-testing every 5 years     Recommendations may differ for women with a history of total hysterectomy, cervical cancer, or abnormal pap smears in past  Pap Smear: Not on file    Patient Declines   Hepatitis C Screening Once for adults born between [facility]  More frequently in patients at high risk for Hepatitis C Hep C Antibody: Not on file    Screening Not Indicated   Diabetes Screening 1-2 times per year if you're at risk for diabetes or have pre-diabetes Fasting glucose: No results in last 5 years   A1C: 6 1 %    History Diabetes  Screening Current   Cholesterol Screening Once every 5 years if you don't have a lipid disorder  May order more often based on risk factors  Lipid panel: 07/10/2020    History Lipid Disorder  Risks and Benefits Discussed     Other Preventive Screenings Covered by Medicare:  1  Abdominal Aortic Aneurysm (AAA) Screening: covered once if your at risk  You're considered to be at risk if you have a family history of AAA  2  Lung Cancer Screening: covers low dose CT scan once per year if you meet all of the following conditions: (1) Age 50-69; (2) No signs or symptoms of lung cancer; (3) Current smoker or have quit smoking within the last 15 years; (4) You have a tobacco smoking history of at least 30 pack years (packs per day multiplied by number of years you smoked); (5) You get a written order from a healthcare provider  3  Glaucoma Screening: covered annually if you're considered high risk: (1) You have diabetes OR (2) Family history of glaucoma OR (3)  aged 48 and older OR (3)  American aged 72 and older  3  Osteoporosis Screening: covered every 2 years if you meet one of the following conditions: (1) You're estrogen deficient and at risk for osteoporosis based off medical history and other findings; (2) Have a vertebral abnormality; (3) On glucocorticoid therapy for more than 3 months; (4) Have primary hyperparathyroidism; (5) On osteoporosis medications and need to assess response to drug therapy  · Last bone density test (DXA Scan): Not on file  5  HIV Screening: covered annually if you're between the age of 12-76  Also covered annually if you are younger than 13 and older than 72 with risk factors for HIV infection   For pregnant patients, it is covered up to 3 times per pregnancy  Immunizations:  Immunization Recommendations   Influenza Vaccine Annual influenza vaccination during flu season is recommended for all persons aged >= 6 months who do not have contraindications   Pneumococcal Vaccine (Prevnar and Pneumovax)  * Prevnar = PCV13  * Pneumovax = PPSV23   Adults 25-60 years old: 1-3 doses may be recommended based on certain risk factors  Adults 72 years old: Prevnar (PCV13) vaccine recommended followed by Pneumovax (PPSV23) vaccine  If already received PPSV23 since turning 65, then PCV13 recommended at least one year after PPSV23 dose  Hepatitis B Vaccine 3 dose series if at intermediate or high risk (ex: diabetes, end stage renal disease, liver disease)   Tetanus (Td) Vaccine - COST NOT COVERED BY MEDICARE PART B Following completion of primary series, a booster dose should be given every 10 years to maintain immunity against tetanus  Td may also be given as tetanus wound prophylaxis  Tdap Vaccine - COST NOT COVERED BY MEDICARE PART B Recommended at least once for all adults  For pregnant patients, recommended with each pregnancy  Shingles Vaccine (Shingrix) - COST NOT COVERED BY MEDICARE PART B  2 shot series recommended in those aged 48 and above     Health Maintenance Due:      Topic Date Due    Cervical Cancer Screening  Never done    Lung Cancer Screening  Never done    Colonoscopy Surveillance  01/27/2019    HIV Screening  04/23/2021 (Originally 2/18/1975)    Hepatitis C Screening  03/23/2022 (Originally 1960)    MAMMOGRAM  03/23/2022 (Originally 1960)    Colorectal Cancer Screening  01/27/2028     Immunizations Due:      Topic Date Due    COVID-19 Vaccine (1) Never done    DTaP,Tdap,and Td Vaccines (1 - Tdap) Never done    Pneumococcal Vaccine: Pediatrics (0 to 5 Years) and At-Risk Patients (6 to 59 Years) (2 of 3 - PCV13) 03/09/2021     Advance Directives   What are advance directives?   Advance directives are legal documents that state your wishes and plans for medical care  These plans are made ahead of time in case you lose your ability to make decisions for yourself  Advance directives can apply to any medical decision, such as the treatments you want, and if you want to donate organs  What are the types of advance directives? There are many types of advance directives, and each state has rules about how to use them  You may choose a combination of any of the following:  · Living will: This is a written record of the treatment you want  You can also choose which treatments you do not want, which to limit, and which to stop at a certain time  This includes surgery, medicine, IV fluid, and tube feedings  · Durable power of  for healthcare [facility]): This is a written record that states who you want to make healthcare choices for you when you are unable to make them for yourself  This person, called a proxy, is usually a family member or a friend  You may choose more than 1 proxy  · Do not resuscitate (DNR) order:  A DNR order is used in case your heart stops beating or you stop breathing  It is a request not to have certain forms of treatment, such as CPR  A DNR order may be included in other types of advance directives  · Medical directive: This covers the care that you want if you are in a coma, near death, or unable to make decisions for yourself  You can list the treatments you want for each condition  Treatment may include pain medicine, surgery, blood transfusions, dialysis, IV or tube feedings, and a ventilator (breathing machine)  · Values history: This document has questions about your views, beliefs, and how you feel and think about life  This information can help others choose the care that you would choose  Why are advance directives important? An advance directive helps you control your care  Although spoken wishes may be used, it is better to have your wishes written down   Spoken wishes can be misunderstood, or not followed  Treatments may be given even if you do not want them  An advance directive may make it easier for your family to make difficult choices about your care  Weight Management   Why it is important to manage your weight:  Being overweight increases your risk of health conditions such as heart disease, high blood pressure, type 2 diabetes, and certain types of cancer  It can also increase your risk for osteoarthritis, sleep apnea, and other respiratory problems  Aim for a slow, steady weight loss  Even a small amount of weight loss can lower your risk of health problems  How to lose weight safely:  A safe and healthy way to lose weight is to eat fewer calories and get regular exercise  You can lose up about 1 pound a week by decreasing the number of calories you eat by 500 calories each day  Healthy meal plan for weight management:  A healthy meal plan includes a variety of foods, contains fewer calories, and helps you stay healthy  A healthy meal plan includes the following:  · Eat whole-grain foods more often  A healthy meal plan should contain fiber  Fiber is the part of grains, fruits, and vegetables that is not broken down by your body  Whole-grain foods are healthy and provide extra fiber in your diet  Some examples of whole-grain foods are whole-wheat breads and pastas, oatmeal, brown rice, and bulgur  · Eat a variety of vegetables every day  Include dark, leafy greens such as spinach, kale, bolivar greens, and mustard greens  Eat yellow and orange vegetables such as carrots, sweet potatoes, and winter squash  · Eat a variety of fruits every day  Choose fresh or canned fruit (canned in its own juice or light syrup) instead of juice  Fruit juice has very little or no fiber  · Eat low-fat dairy foods  Drink fat-free (skim) milk or 1% milk  Eat fat-free yogurt and low-fat cottage cheese   Try low-fat cheeses such as mozzarella and other reduced-fat cheeses  · Choose meat and other protein foods that are low in fat  Choose beans or other legumes such as split peas or lentils  Choose fish, skinless poultry (chicken or turkey), or lean cuts of red meat (beef or pork)  Before you cook meat or poultry, cut off any visible fat  · Use less fat and oil  Try baking foods instead of frying them  Add less fat, such as margarine, sour cream, regular salad dressing and mayonnaise to foods  Eat fewer high-fat foods  Some examples of high-fat foods include french fries, doughnuts, ice cream, and cakes  · Eat fewer sweets  Limit foods and drinks that are high in sugar  This includes candy, cookies, regular soda, and sweetened drinks  Exercise:  Exercise at least 30 minutes per day on most days of the week  Some examples of exercise include walking, biking, dancing, and swimming  You can also fit in more physical activity by taking the stairs instead of the elevator or parking farther away from stores  Ask your healthcare provider about the best exercise plan for you  © Copyright [employer] 2018 Information is for End User's use only and may not be sold, redistributed or otherwise used for commercial purposes   All illustrations and images included in CareNotes® are the copyrighted property of A D A M , Inc  or [address] [place] [address] [employer]

## 2021-03-23 NOTE — PROGRESS NOTES
Assessment/Plan:    GERD (gastroesophageal reflux disease)  · Stable   · On famotidine 20 mg PO BID       Diabetes mellitus ([facility] )    Lab Results   Component Value Date    HGBA1C 6 1 (H) 09/30/2020     · Not on any medication   · Will order POCT HbA1c     Hypothyroid  · Stable on levothyroxine 100 mcg PO daily   · TSH -- 3 46 in July 2020  · Will order TSH     COPD with asthma ([facility] )  · On baseline O 2 of 3 L   · Uses ventolin inhaler PRN and nebulizers   · Stable otherwise     Paroxysmal atrial fibrillation (HCC)  · Currently on metoprolol succinate ER 25 mg PO daily   · Is on coumadin   · Referral provided during last visit to see cardiologist   · ECHO done in September 2020 - EF - 55 - 60%   · INR done on 03/09 2 1, INR done on 03/12 -- pending result       Chronic kidney disease, stage III (moderate)  Lab Results   Component Value Date    EGFR 25 10/02/2020    EGFR 23 10/01/2020    EGFR 23 09/30/2020    CREATININE 1 69 (H) 01/04/2021    CREATININE 1 76 (H) 10/07/2020    CREATININE 2 11 (H) 10/02/2020     · Follows with nephrologist- Dr Sami Riggs     Dyslipidemia  · Continue atorvastatin        Diagnoses and all orders for this visit:    Dyslipidemia  -     Lipid panel; Future  -     TSH, 3rd generation with Free T4 reflex; Future  -     atorvastatin (LIPITOR) 10 mg tablet; Take 1 tablet (10 mg total) by mouth daily with dinner    Type 2 diabetes mellitus without complication, without long-term current use of insulin (HCC)  -     POCT hemoglobin A1c    Medicare annual wellness visit, initial    Gastroesophageal reflux disease without esophagitis  -     famotidine (PEPCID) 20 mg tablet; Take 1 tablet (20 mg total) by mouth 2 (two) times a day    Essential hypertension  -     metoprolol succinate (TOPROL-XL) 25 mg 24 hr tablet; Take 1 tablet (25 mg total) by mouth daily    Atrial fibrillation, unspecified type (HCC)  -     warfarin (COUMADIN) 1 mg tablet;  Take 1 tablet (1 mg total) by mouth daily  -     warfarin (COUMADIN) 3 mg tablet; Take 1 tablet (3 mg total) by mouth daily    Other specified hypothyroidism  -     levothyroxine 100 mcg tablet; Take 1 tablet (100 mcg total) by mouth daily    Other diabetic neurological complication associated with type 2 diabetes mellitus (HCC)  -     DULoxetine (CYMBALTA) 30 mg delayed release capsule; Take 1 capsule (30 mg total) by mouth 2 (two) times a day    Stage 3b chronic kidney disease  -     cholecalciferol (VITAMIN D3) 400 units tablet; Take 1 tablet (400 Units total) by mouth daily    Other orders  -     Discontinue: levothyroxine 100 mcg tablet; Take 100 mcg by mouth daily          Subjective:      Patient ID: Yanet Garner is a 57 y o  female  Patient is here for follow-up visit  She has a past medical history of COPD, on baseline 3 L oxygen, gastroesophageal reflux disease, hypothyroidism, hyperlipidemia  Has been complaining of neuropathic symptoms in the feet - gabapentin has not worked and currently she is not on any medications  COPD-patient uses fluticasone salmeterol inhaler, also uses nebulizers and ventonin inhaler PRN  Is on baseline oxygen requirement of 3 L  Denies any current shortness of breath  Can walk from bed to the bathroom which is about  25 steps  Gastroesophageal reflux disease-stable on famotidine 20 mg p o  b i d  Hypothyroidism-is on levothyroxine 100 mcg p o  daily  TSH in July-3 46  Denies any fatigue, skin or hair changes  Has constipation from the iron pills  Also has internal hemorrhoids : which tend to bleed with constipation and straining  Miralax is not working, apple juice is not beneficial  Sennokot is not affordable  Hyperlipidemia-is on atorvastatin  Able to tolerate medication without side effects          The following portions of the patient's history were reviewed and updated as appropriate:   She  has a past medical history of Anemia, Arthritis, Cancer of kidney ([address] ), Chronic kidney disease, Diabetes mellitus ([place] [address] ), Disease of thyroid gland, HLD (hyperlipidemia), Hypertension, Ovarian cancer ([place] [address] ), and Pneumonia  She   Patient Active Problem List    Diagnosis Date Noted    GERD (gastroesophageal reflux disease) 12/22/2020     Priority: A    Diabetes mellitus ([place] [address] ) 09/30/2020     Priority: B    Hypothyroid 09/29/2020     Priority: C    COPD with asthma ([place] [address] ) 09/29/2020     Priority: D    Paroxysmal atrial fibrillation ([place] [address] ) 09/29/2020     Priority: E    Chronic kidney disease, stage III (moderate) 10/04/2018     Priority: F    Dyslipidemia 02/07/2019     Priority: G    Secondary hyperparathyroidism of renal origin ([place] [address] ) 01/12/2021    Anxiety 12/22/2020    Chronic constipation 09/30/2020    Severe sepsis ([place] [address] ) 09/29/2020    Acute renal failure superimposed on chronic kidney disease ([place] [address] ) 09/29/2020    Cellulitis 09/10/2020    H/O right nephrectomy 09/10/2020    SOB (shortness of breath) 09/09/2020    Hyperparathyroidism ([place] [address] ) 06/24/2020    Anemia due to stage 3b chronic kidney disease 10/04/2018    Hypertensive chronic kidney disease with stage 1 through stage 4 chronic kidney disease, or unspecified chronic kidney disease 10/04/2018    Persistent proteinuria 10/04/2018    Iron deficiency 10/04/2018     She  has a past surgical history that includes Nephrectomy (Right, 08/02/2018); Hysterectomy (06/01/2018); and Gallbladder surgery (05/01/2004)  Her family history includes No Known Problems in her father and mother  She  reports that she quit smoking about 10 years ago  She has a 90 00 pack-year smoking history  She quit smokeless tobacco use about 9 years ago  She reports current drug use  Drug: Marijuana  She reports that she does not drink alcohol    Current Outpatient Medications   Medication Sig Dispense Refill    levothyroxine 100 mcg tablet Take 1 tablet (100 mcg total) by mouth daily for 15 days 15 tablet 0    levothyroxine 100 mcg tablet Take 1 tablet (100 mcg total) by mouth daily 90 tablet 0    albuterol (2 5 mg/3 mL) 0 083 % nebulizer solution INHALE CONTENTS OF 1 VIAL ( 3 MILLILITERS ) IN NEBULIZER BY MOUTH AND INTO THE LUNGS EVERY 6 HOURS IF NEEDED FOR WHEEZING OR SHORTNESS OF BREATH 75 mL 5    atorvastatin (LIPITOR) 10 mg tablet Take 1 tablet (10 mg total) by mouth daily with dinner 90 tablet 1    cholecalciferol (VITAMIN D3) 400 units tablet Take 1 tablet (400 Units total) by mouth daily 90 tablet 1    DULoxetine (CYMBALTA) 30 mg delayed release capsule Take 1 capsule (30 mg total) by mouth 2 (two) times a day 180 capsule 0    escitalopram (LEXAPRO) 10 mg tablet Take 1 tablet (10 mg total) by mouth daily Take half at 5mg for 1 week and then take whole pill (Patient not taking: Reported on 1/19/2021) 90 tablet 0    famotidine (PEPCID) 20 mg tablet Take 1 tablet (20 mg total) by mouth 2 (two) times a day 180 tablet 0    famotidine (PEPCID) 20 mg tablet Take 1 tablet (20 mg total) by mouth 2 (two) times a day 90 tablet 1    fluticasone-salmeterol (ADVAIR, WIXELA) 250-50 mcg/dose inhaler Inhale 1 puff 2 (two) times a day Rinse mouth after use   folic acid (FOLVITE) 1 mg tablet Take 1,000 mcg by mouth daily  0    furosemide (LASIX) 40 mg tablet Take 1 tablet (40 mg total) by mouth daily Hold Lasix till 10/6/20, Check Blood work BMP on 10/6/20   (Patient not taking: Reported on 1/19/2021)  0    ipratropium (ATROVENT) 0 02 % nebulizer solution Take 1 vial (0 5 mg total) by nebulization 4 (four) times a day 75 mL 5    iron polysaccharides (FERREX) 150 mg capsule Take 1 capsule (150 mg total) by mouth daily  0    LORazepam (ATIVAN) 0 5 mg tablet Take 1 tablet (0 5 mg total) by mouth every 8 (eight) hours as needed for anxiety 30 tablet 1    metoprolol succinate (TOPROL-XL) 25 mg 24 hr tablet Take 1 tablet (25 mg total) by mouth daily 90 tablet 3    vitamin B-12 (VITAMIN B-12) 500 mcg tablet Take 1 tablet (500 mcg total) by mouth daily 90 tablet 3    warfarin (COUMADIN) 1 mg tablet Take 1 tablet (1 mg total) by mouth daily 30 tablet 5    warfarin (COUMADIN) 3 mg tablet Take 1 tablet (3 mg total) by mouth daily 90 tablet 0     No current facility-administered medications for this visit  Current Outpatient Medications on File Prior to Visit   Medication Sig    levothyroxine 100 mcg tablet Take 1 tablet (100 mcg total) by mouth daily for 15 days    [DISCONTINUED] levothyroxine 100 mcg tablet Take 100 mcg by mouth daily    albuterol (2 5 mg/3 mL) 0 083 % nebulizer solution INHALE CONTENTS OF 1 VIAL ( 3 MILLILITERS ) IN NEBULIZER BY MOUTH AND INTO THE LUNGS EVERY 6 HOURS IF NEEDED FOR WHEEZING OR SHORTNESS OF BREATH    escitalopram (LEXAPRO) 10 mg tablet Take 1 tablet (10 mg total) by mouth daily Take half at 5mg for 1 week and then take whole pill (Patient not taking: Reported on 1/19/2021)    famotidine (PEPCID) 20 mg tablet Take 1 tablet (20 mg total) by mouth 2 (two) times a day    fluticasone-salmeterol (ADVAIR, WIXELA) 250-50 mcg/dose inhaler Inhale 1 puff 2 (two) times a day Rinse mouth after use   folic acid (FOLVITE) 1 mg tablet Take 1,000 mcg by mouth daily    furosemide (LASIX) 40 mg tablet Take 1 tablet (40 mg total) by mouth daily Hold Lasix till 10/6/20, Check Blood work BMP on 10/6/20   (Patient not taking: Reported on 1/19/2021)    ipratropium (ATROVENT) 0 02 % nebulizer solution Take 1 vial (0 5 mg total) by nebulization 4 (four) times a day    iron polysaccharides (FERREX) 150 mg capsule Take 1 capsule (150 mg total) by mouth daily    LORazepam (ATIVAN) 0 5 mg tablet Take 1 tablet (0 5 mg total) by mouth every 8 (eight) hours as needed for anxiety    vitamin B-12 (VITAMIN B-12) 500 mcg tablet Take 1 tablet (500 mcg total) by mouth daily    [DISCONTINUED] atorvastatin (LIPITOR) 10 mg tablet Take 1 tablet (10 mg total) by mouth daily with dinner    [DISCONTINUED] cholecalciferol (VITAMIN D3) 400 units tablet take 1 tablet by mouth daily    [DISCONTINUED] famotidine (PEPCID) 20 mg tablet Take 1 tablet (20 mg total) by mouth 2 (two) times a day    [DISCONTINUED] metoprolol succinate (TOPROL-XL) 25 mg 24 hr tablet Take 1 tablet (25 mg total) by mouth daily    [DISCONTINUED] warfarin (COUMADIN) 1 mg tablet take 1 tablet by mouth once daily    [DISCONTINUED] warfarin (COUMADIN) 3 mg tablet Take 1 tablet (3 mg total) by mouth daily     No current facility-administered medications on file prior to visit  She has No Known Allergies       Review of Systems   Constitutional: Negative for chills and fever  HENT: Negative for rhinorrhea and sore throat  Eyes: Negative for pain and visual disturbance  Respiratory: Negative for cough and shortness of breath  Cardiovascular: Negative for chest pain, palpitations and leg swelling  Gastrointestinal: Negative for abdominal pain, diarrhea, nausea and vomiting  Genitourinary: Negative for dysuria and hematuria  Musculoskeletal: Negative for arthralgias and back pain  Skin: Negative for color change and rash  Neurological: Positive for numbness  Negative for dizziness, seizures, syncope, weakness and light-headedness  Psychiatric/Behavioral: Negative for behavioral problems  All other systems reviewed and are negative  Objective:      /64 (BP Location: Left arm, Patient Position: Sitting, Cuff Size: Large)   Pulse 92   Temp 98 1 °F (36 7 °C) (Temporal)   Resp 20   Ht 5' 1" (1 549 m)   Wt (!) 140 kg (309 lb)   SpO2 95%   BMI 58 39 kg/m²          Physical Exam  Vitals signs reviewed  Constitutional:       General: She is not in acute distress  Appearance: She is obese  HENT:      Mouth/Throat:      Mouth: Mucous membranes are moist    Eyes:      General:         Right eye: No discharge  Left eye: No discharge  Conjunctiva/sclera: Conjunctivae normal       Pupils: Pupils are equal, round, and reactive to light  Neck:      Musculoskeletal: Neck supple  Cardiovascular:      Rate and Rhythm: Tachycardia present  Rhythm irregular  Pulses: Normal pulses  Heart sounds: Normal heart sounds  No murmur  Pulmonary:      Effort: Pulmonary effort is normal  No respiratory distress  Breath sounds: Normal breath sounds  No wheezing or rales  Abdominal:      General: There is no distension  Palpations: Abdomen is soft  Tenderness: There is no abdominal tenderness  Musculoskeletal:      Right lower leg: No edema  Left lower leg: No edema  Skin:     General: Skin is warm  Capillary Refill: Capillary refill takes less than 2 seconds  Neurological:      Mental Status: She is alert and oriented to person, place, and time  Sensory: Sensory deficit present  Motor: No weakness     Psychiatric:         Mood and Affect: Mood normal

## 2021-03-23 NOTE — ASSESSMENT & PLAN NOTE
· Currently on metoprolol succinate ER 25 mg PO daily   · Is on coumadin   · Referral provided during last visit to see cardiologist   · ECHO done in September 2020 - EF - 55 - 60%   · INR done on 03/09 2 1, INR done on 03/12 -- pending result

## 2021-03-23 NOTE — PROGRESS NOTES
Diabetic Foot Exam    Patient's shoes and socks removed  Right Foot/Ankle   Right Foot Inspection  Skin Exam: skin normal, skin intact and dry skin no warmth, no callus, no erythema, no maceration, no abnormal color, no pre-ulcer, no ulcer and no callus                          Toe Exam: ROM and strength within normal limitsno swelling, no tenderness, erythema and  no right toe deformity  Sensory     Proprioception: intact   Monofilament testing: diminished  Vascular  Capillary refills: < 3 seconds  The right DP pulse is 1+  The right PT pulse is 1+  Left Foot/Ankle  Left Foot Inspection  Skin Exam: skin normal, skin intact and dry skinno warmth, no erythema, no maceration, normal color, no pre-ulcer, no ulcer and no callus                         Toe Exam: ROM and strength within normal limitsno swelling, no tenderness, no erythema and no left toe deformity                   Sensory     Proprioception: intact  Monofilament: diminished  Vascular  Capillary refills: < 3 seconds  The left DP pulse is 1+  The left PT pulse is 1+  Assign Risk Category:  No deformity present;  Loss of protective sensation; Weak pulses       Risk: 2

## 2021-03-30 LAB
INR PPP: 1.9
PROTHROMBIN TIME: 19 SEC (ref 9–11.5)

## 2021-04-07 ENCOUNTER — TELEPHONE (OUTPATIENT)
Dept: FAMILY MEDICINE CLINIC | Facility: CLINIC | Age: 61
End: 2021-04-07

## 2021-04-19 LAB
CHOLEST SERPL-MCNC: 154 MG/DL
CHOLEST/HDLC SERPL: 5.5 (CALC)
HDLC SERPL-MCNC: 28 MG/DL
LDLC SERPL CALC-MCNC: 99 MG/DL (CALC)
NONHDLC SERPL-MCNC: 126 MG/DL (CALC)
TRIGL SERPL-MCNC: 167 MG/DL
TSH SERPL-ACNC: 2.65 MIU/L (ref 0.4–4.5)

## 2021-05-07 ENCOUNTER — TELEPHONE (OUTPATIENT)
Dept: FAMILY MEDICINE CLINIC | Facility: CLINIC | Age: 61
End: 2021-05-07

## 2021-05-07 DIAGNOSIS — F41.0 PANIC ATTACK: ICD-10-CM

## 2021-05-07 DIAGNOSIS — E78.5 DYSLIPIDEMIA: ICD-10-CM

## 2021-05-07 RX ORDER — ATORVASTATIN CALCIUM 10 MG/1
10 TABLET, FILM COATED ORAL
Qty: 90 TABLET | Refills: 1 | Status: SHIPPED | OUTPATIENT
Start: 2021-05-07 | End: 2021-05-09

## 2021-05-07 RX ORDER — LORAZEPAM 0.5 MG/1
0.5 TABLET ORAL EVERY 8 HOURS PRN
Qty: 30 TABLET | Refills: 1 | Status: SHIPPED | OUTPATIENT
Start: 2021-05-07 | End: 2021-10-03

## 2021-05-07 NOTE — TELEPHONE ENCOUNTER
Patient called asking for a refill of medications  Patient is currently out of the medications  Patient states that they called the office two days in a row but did not leave a message  I explained that Dr Estuardo Anders is out of the office today and I would send to his colleague for refills  I also explained that any time they call the office they should leave a message so that we know to call them back      Please refill the following:    atorvastatin (LIPITOR) 10 mg tablet  LORazepam (ATIVAN) 0 5 mg tablet    [id number] [place] [place] - [address] [place] [address]

## 2021-05-09 DIAGNOSIS — E78.5 DYSLIPIDEMIA: ICD-10-CM

## 2021-05-09 RX ORDER — ATORVASTATIN CALCIUM 10 MG/1
TABLET, FILM COATED ORAL
Qty: 90 TABLET | Refills: 1 | Status: SHIPPED | OUTPATIENT
Start: 2021-05-09 | End: 2021-06-24 | Stop reason: SDUPTHER

## 2021-05-13 ENCOUNTER — APPOINTMENT (EMERGENCY)
Dept: CT IMAGING | Facility: HOSPITAL | Age: 61
End: 2021-05-13
Payer: MEDICARE

## 2021-05-13 ENCOUNTER — HOSPITAL ENCOUNTER (EMERGENCY)
Facility: HOSPITAL | Age: 61
Discharge: HOME | End: 2021-05-13
Attending: EMERGENCY MEDICINE
Payer: MEDICARE

## 2021-05-13 VITALS
OXYGEN SATURATION: 100 % | DIASTOLIC BLOOD PRESSURE: 102 MMHG | SYSTOLIC BLOOD PRESSURE: 158 MMHG | BODY MASS INDEX: 55.82 KG/M2 | RESPIRATION RATE: 20 BRPM | TEMPERATURE: 97.6 F | WEIGHT: 293 LBS | HEART RATE: 104 BPM

## 2021-05-13 DIAGNOSIS — K43.9 VENTRAL HERNIA: ICD-10-CM

## 2021-05-13 DIAGNOSIS — R11.2 NAUSEA AND VOMITING: Primary | ICD-10-CM

## 2021-05-13 DIAGNOSIS — K29.70 GASTRITIS: ICD-10-CM

## 2021-05-13 LAB
ALBUMIN SERPL BCP-MCNC: 3.8 G/DL (ref 3.4–4.8)
ALP SERPL-CCNC: 134.6 U/L (ref 35–140)
ALT SERPL W P-5'-P-CCNC: 11 U/L (ref 5–54)
ANION GAP SERPL CALCULATED.3IONS-SCNC: 9 MMOL/L (ref 4–13)
APTT PPP: 31 SECONDS (ref 23–31)
AST SERPL W P-5'-P-CCNC: 13 U/L (ref 15–41)
BACTERIA UR QL AUTO: NORMAL /HPF
BASOPHILS # BLD AUTO: 0.02 THOUSANDS/ΜL (ref 0–0.1)
BASOPHILS NFR BLD AUTO: 0 % (ref 0–1)
BILIRUB SERPL-MCNC: 0.43 MG/DL (ref 0.3–1.2)
BILIRUB UR QL STRIP: NEGATIVE
BNP SERPL-MCNC: 81.9 PG/ML (ref 1–100)
BUN SERPL-MCNC: 19 MG/DL (ref 6–20)
CALCIUM SERPL-MCNC: 9.8 MG/DL (ref 8.4–10.2)
CHLORIDE SERPL-SCNC: 104 MMOL/L (ref 96–108)
CLARITY UR: CLEAR
CO2 SERPL-SCNC: 27 MMOL/L (ref 22–33)
COLOR UR: YELLOW
CREAT SERPL-MCNC: 1.71 MG/DL (ref 0.4–1.1)
EOSINOPHIL # BLD AUTO: 0.11 THOUSAND/ΜL (ref 0–0.61)
EOSINOPHIL NFR BLD AUTO: 1 % (ref 0–6)
ERYTHROCYTE [DISTWIDTH] IN BLOOD BY AUTOMATED COUNT: 15.4 % (ref 11.6–15.1)
GFR SERPL CREATININE-BSD FRML MDRD: 32 ML/MIN/1.73SQ M
GLUCOSE SERPL-MCNC: 161 MG/DL (ref 65–140)
GLUCOSE UR STRIP-MCNC: NEGATIVE MG/DL
HCT VFR BLD AUTO: 41.5 % (ref 34.8–46.1)
HGB BLD-MCNC: 12.9 G/DL (ref 11.5–15.4)
HGB UR QL STRIP.AUTO: ABNORMAL
IMM GRANULOCYTES # BLD AUTO: 0.03 THOUSAND/UL (ref 0–0.2)
IMM GRANULOCYTES NFR BLD AUTO: 0 % (ref 0–2)
INR PPP: 1.33 (ref 0.9–1.1)
KETONES UR STRIP-MCNC: NEGATIVE MG/DL
LACTATE SERPL-SCNC: 1.5 MMOL/L (ref 0–2)
LEUKOCYTE ESTERASE UR QL STRIP: NEGATIVE
LIPASE SERPL-CCNC: 24 U/L (ref 13–60)
LYMPHOCYTES # BLD AUTO: 1.09 THOUSANDS/ΜL (ref 0.6–4.47)
LYMPHOCYTES NFR BLD AUTO: 12 % (ref 14–44)
MCH RBC QN AUTO: 31.9 PG (ref 26.8–34.3)
MCHC RBC AUTO-ENTMCNC: 31.1 G/DL (ref 31.4–37.4)
MCV RBC AUTO: 103 FL (ref 82–98)
MONOCYTES # BLD AUTO: 0.63 THOUSAND/ΜL (ref 0.17–1.22)
MONOCYTES NFR BLD AUTO: 7 % (ref 4–12)
NEUTROPHILS # BLD AUTO: 7.55 THOUSANDS/ΜL (ref 1.85–7.62)
NEUTS SEG NFR BLD AUTO: 80 % (ref 43–75)
NITRITE UR QL STRIP: NEGATIVE
NON-SQ EPI CELLS URNS QL MICRO: NORMAL /HPF
PH UR STRIP.AUTO: 8 [PH]
PLATELET # BLD AUTO: 304 THOUSANDS/UL (ref 149–390)
PMV BLD AUTO: 9.6 FL (ref 8.9–12.7)
POTASSIUM SERPL-SCNC: 4.7 MMOL/L (ref 3.5–5)
PROT SERPL-MCNC: 8.1 G/DL (ref 6.4–8.3)
PROT UR STRIP-MCNC: NEGATIVE MG/DL
PROTHROMBIN TIME: 14.8 SECONDS (ref 9.5–12.1)
RBC # BLD AUTO: 4.05 MILLION/UL (ref 3.81–5.12)
RBC #/AREA URNS AUTO: NORMAL /HPF
SODIUM SERPL-SCNC: 140 MMOL/L (ref 133–145)
SP GR UR STRIP.AUTO: 1.01 (ref 1–1.03)
TROPONIN I SERPL-MCNC: <0.03 NG/ML (ref 0–0.07)
TSH SERPL DL<=0.05 MIU/L-ACNC: 1.97 UIU/ML (ref 0.34–5.6)
UROBILINOGEN UR QL STRIP.AUTO: 0.2 E.U./DL
WBC # BLD AUTO: 9.43 THOUSAND/UL (ref 4.31–10.16)
WBC #/AREA URNS AUTO: NORMAL /HPF

## 2021-05-13 PROCEDURE — 83605 ASSAY OF LACTIC ACID: CPT | Performed by: EMERGENCY MEDICINE

## 2021-05-13 PROCEDURE — 74176 CT ABD & PELVIS W/O CONTRAST: CPT

## 2021-05-13 PROCEDURE — 85025 COMPLETE CBC W/AUTO DIFF WBC: CPT | Performed by: EMERGENCY MEDICINE

## 2021-05-13 PROCEDURE — 83690 ASSAY OF LIPASE: CPT | Performed by: EMERGENCY MEDICINE

## 2021-05-13 PROCEDURE — 36415 COLL VENOUS BLD VENIPUNCTURE: CPT | Performed by: EMERGENCY MEDICINE

## 2021-05-13 PROCEDURE — 71250 CT THORAX DX C-: CPT

## 2021-05-13 PROCEDURE — 84484 ASSAY OF TROPONIN QUANT: CPT | Performed by: EMERGENCY MEDICINE

## 2021-05-13 PROCEDURE — 80053 COMPREHEN METABOLIC PANEL: CPT | Performed by: EMERGENCY MEDICINE

## 2021-05-13 PROCEDURE — 85730 THROMBOPLASTIN TIME PARTIAL: CPT | Performed by: EMERGENCY MEDICINE

## 2021-05-13 PROCEDURE — 99285 EMERGENCY DEPT VISIT HI MDM: CPT

## 2021-05-13 PROCEDURE — 83880 ASSAY OF NATRIURETIC PEPTIDE: CPT | Performed by: EMERGENCY MEDICINE

## 2021-05-13 PROCEDURE — 96374 THER/PROPH/DIAG INJ IV PUSH: CPT

## 2021-05-13 PROCEDURE — 84443 ASSAY THYROID STIM HORMONE: CPT | Performed by: EMERGENCY MEDICINE

## 2021-05-13 PROCEDURE — 81001 URINALYSIS AUTO W/SCOPE: CPT | Performed by: EMERGENCY MEDICINE

## 2021-05-13 PROCEDURE — 99285 EMERGENCY DEPT VISIT HI MDM: CPT | Performed by: EMERGENCY MEDICINE

## 2021-05-13 PROCEDURE — 96361 HYDRATE IV INFUSION ADD-ON: CPT

## 2021-05-13 PROCEDURE — 96375 TX/PRO/DX INJ NEW DRUG ADDON: CPT

## 2021-05-13 PROCEDURE — 85610 PROTHROMBIN TIME: CPT | Performed by: EMERGENCY MEDICINE

## 2021-05-13 RX ORDER — PROMETHAZINE HYDROCHLORIDE 25 MG/ML
25 INJECTION, SOLUTION INTRAMUSCULAR; INTRAVENOUS ONCE
Status: COMPLETED | OUTPATIENT
Start: 2021-05-13 | End: 2021-05-13

## 2021-05-13 RX ORDER — ONDANSETRON 4 MG/1
4 TABLET, ORALLY DISINTEGRATING ORAL EVERY 8 HOURS PRN
Qty: 30 TABLET | Refills: 0 | Status: SHIPPED | OUTPATIENT
Start: 2021-05-13 | End: 2021-07-16

## 2021-05-13 RX ORDER — ONDANSETRON 2 MG/ML
1 INJECTION INTRAMUSCULAR; INTRAVENOUS ONCE
Status: COMPLETED | OUTPATIENT
Start: 2021-05-13 | End: 2021-05-13

## 2021-05-13 RX ORDER — MORPHINE SULFATE 4 MG/ML
4 INJECTION, SOLUTION INTRAMUSCULAR; INTRAVENOUS ONCE
Status: COMPLETED | OUTPATIENT
Start: 2021-05-13 | End: 2021-05-13

## 2021-05-13 RX ORDER — ONDANSETRON 2 MG/ML
4 INJECTION INTRAMUSCULAR; INTRAVENOUS ONCE
Status: COMPLETED | OUTPATIENT
Start: 2021-05-13 | End: 2021-05-13

## 2021-05-13 RX ORDER — FERROUS SULFATE 325(65) MG
325 TABLET ORAL
COMMUNITY

## 2021-05-13 RX ORDER — PROMETHAZINE HYDROCHLORIDE 25 MG/1
25 TABLET ORAL EVERY 6 HOURS PRN
Qty: 30 TABLET | Refills: 0 | Status: SHIPPED | OUTPATIENT
Start: 2021-05-13 | End: 2021-07-16

## 2021-05-13 RX ORDER — PANTOPRAZOLE SODIUM 40 MG/1
40 TABLET, DELAYED RELEASE ORAL DAILY
Qty: 30 TABLET | Refills: 0 | Status: SHIPPED | OUTPATIENT
Start: 2021-05-13 | End: 2021-07-16

## 2021-05-13 RX ADMIN — SODIUM CHLORIDE 1000 ML: 0.9 INJECTION, SOLUTION INTRAVENOUS at 11:42

## 2021-05-13 RX ADMIN — PROMETHAZINE HYDROCHLORIDE 25 MG: 25 INJECTION INTRAMUSCULAR; INTRAVENOUS at 14:19

## 2021-05-13 RX ADMIN — MORPHINE SULFATE 4 MG: 4 INJECTION INTRAVENOUS at 11:48

## 2021-05-13 RX ADMIN — ONDANSETRON 4 MG: 2 INJECTION INTRAMUSCULAR; INTRAVENOUS at 11:49

## 2021-05-13 NOTE — DISCHARGE INSTRUCTIONS
Follow-up with primary MD in 2 days for re-evaluation  Also follow-up with General surgery for further evaluation and treatment of the hernias

## 2021-05-13 NOTE — ED PROVIDER NOTES
History  Chief Complaint   Patient presents with    Nausea     Pt received 2nd COVID vaccine 5/2 and started not feeling well last night  C/O nausea/vomiting (dry heaves) and SOB  Patient is a 56-year-old female  She presents to the emergency room complaining of upper abdominal pain, shortness of breath, nausea and dry heaving  The vomiting is not bloody or bilious  She is on iron and reports chronic constipation  No diarrhea  No urinary or vaginal complaints  No fever or chills  No chest pain  No cough  There is no hematochezia or melena  Past surgical history includes nephrectomy for renal carcinoma  She has had a cholecystectomy  She had surgery for ectopic pregnancy  She has had hysterectomy for ovarian cancer  She has hernias  She has atrial fibrillation and is chronically anticoagulated on Coumadin  She has hypertension, hyperlipidemia, hypothyroidism, diabetes, chronic kidney disease, arthritis and anemia  She has anxiety and GERD  She has COPD  She is chronically on home O2 at 2 liters/minute nasal cannula  Her symptoms have been going on for several days at least   Today they were worse  She arrives by EMS  She received Zofran EN route without relief  Prior to Admission Medications   Prescriptions Last Dose Informant Patient Reported? Taking?    DULoxetine (CYMBALTA) 30 mg delayed release capsule   No Yes   Sig: Take 1 capsule (30 mg total) by mouth 2 (two) times a day   LORazepam (ATIVAN) 0 5 mg tablet   No Yes   Sig: Take 1 tablet (0 5 mg total) by mouth every 8 (eight) hours as needed for anxiety   albuterol (2 5 mg/3 mL) 0 083 % nebulizer solution   No Yes   Sig: INHALE CONTENTS OF 1 VIAL ( 3 MILLILITERS ) IN NEBULIZER BY MOUTH AND INTO THE LUNGS EVERY 6 HOURS IF NEEDED FOR WHEEZING OR SHORTNESS OF BREATH   atorvastatin (LIPITOR) 10 mg tablet   No Yes   Sig: TAKE 1 TABLET BY MOUTH DAILY   cholecalciferol (VITAMIN D3) 400 units tablet   No Yes   Sig: Take 1 tablet (400 Units total) by mouth daily   famotidine (PEPCID) 20 mg tablet   No Yes   Sig: Take 1 tablet (20 mg total) by mouth 2 (two) times a day   ferrous sulfate 325 (65 Fe) mg tablet   Yes Yes   Sig: Take 325 mg by mouth daily with breakfast   furosemide (LASIX) 40 mg tablet  Self No No   Sig: Take 1 tablet (40 mg total) by mouth daily Hold Lasix till 10/6/20, Check Blood work BMP on 10/6/20  Patient not taking: Reported on 1/19/2021   ipratropium (ATROVENT) 0 02 % nebulizer solution   No Yes   Sig: Take 1 vial (0 5 mg total) by nebulization 4 (four) times a day   levothyroxine 100 mcg tablet   No Yes   Sig: Take 1 tablet (100 mcg total) by mouth daily   metoprolol succinate (TOPROL-XL) 25 mg 24 hr tablet   No Yes   Sig: Take 1 tablet (25 mg total) by mouth daily   vitamin B-12 (VITAMIN B-12) 500 mcg tablet  Self No Yes   Sig: Take 1 tablet (500 mcg total) by mouth daily   warfarin (COUMADIN) 1 mg tablet   No Yes   Sig: Take 1 tablet (1 mg total) by mouth daily   warfarin (COUMADIN) 3 mg tablet   No Yes   Sig: Take 1 tablet (3 mg total) by mouth daily      Facility-Administered Medications: None       Past Medical History:   Diagnosis Date    Anemia     Arthritis     [facility]     Chronic kidney disease     [facility] mellitus ([facility]     Disease of thyroid gland     HLD (hyperlipidemia)     Hypertension     [facility] ([facility] )     Pneumonia        Past Surgical History:   Procedure Laterality Date    CHOLECYSTECTOMY      GALLBLADDER SURGERY  05/01/2004    HYSTERECTOMY  06/01/2018    NEPHRECTOMY Right 08/02/2018       Family History   Problem Relation Age of Onset    No Known Problems Mother     No Known Problems Father      I have reviewed and agree with the history as documented      E-Cigarette/Vaping    E-Cigarette Use Never User      E-Cigarette/Vaping Substances     Social History     Tobacco Use    Smoking status: Former Smoker     Packs/day: 3 00     Years: 30 00     Pack years: 90 00     Quit date: 10/22/2010     Years since quitting: 10 5    Smokeless tobacco: Former User     Quit date: 9/1/2011    Tobacco comment: quit approx  9 years ago   Substance Use Topics    Alcohol use: Never     Frequency: Never     Binge frequency: Never     Comment: n/a    Drug use: Yes     Types: Marijuana     Comment: smokes with girlfriend when anxious       Review of Systems   Constitutional: Negative for chills and fever  HENT: Negative for rhinorrhea and sore throat  Eyes: Negative for pain, redness and visual disturbance  Respiratory: Positive for shortness of breath  Negative for cough  Cardiovascular: Negative for chest pain and leg swelling  Gastrointestinal: Positive for abdominal pain, constipation and nausea  Negative for diarrhea and vomiting  Endocrine: Negative for polydipsia and polyuria  Genitourinary: Negative for dysuria, frequency, hematuria, vaginal bleeding and vaginal discharge  Musculoskeletal: Negative for back pain and neck pain  Skin: Negative for rash and wound  Allergic/Immunologic: Negative for immunocompromised state  Neurological: Negative for weakness, numbness and headaches  Hematological: Does not bruise/bleed easily  Psychiatric/Behavioral: Negative for hallucinations and suicidal ideas  The patient is nervous/anxious  All other systems reviewed and are negative  Physical Exam  Physical Exam  Vitals signs reviewed  Constitutional:       Appearance: She is obese  She is not diaphoretic  HENT:      Head: Normocephalic and atraumatic  Nose: Nose normal       Mouth/Throat:      Mouth: Mucous membranes are dry  Eyes:      General:         Right eye: No discharge  Left eye: No discharge  Conjunctiva/sclera: Conjunctivae normal    Neck:      Musculoskeletal: Normal range of motion and neck supple  No neck rigidity  Cardiovascular:      Rate and Rhythm: Tachycardia present  Rhythm irregular        Pulses: Normal pulses  Heart sounds: Normal heart sounds  No murmur  No friction rub  No gallop  Pulmonary:      Effort: Pulmonary effort is normal  No respiratory distress  Breath sounds: Normal breath sounds  No stridor  No wheezing, rhonchi or rales  Abdominal:      General: Bowel sounds are normal  There is no distension  Palpations: Abdomen is soft  There is no mass  Tenderness: There is abdominal tenderness  There is no right CVA tenderness, left CVA tenderness, guarding or rebound  Hernia: A hernia is present  Comments: There is moderate epigastric tenderness  Musculoskeletal: Normal range of motion  General: No swelling, tenderness, deformity or signs of injury  Right lower leg: No edema  Left lower leg: No edema  Comments: No calf tenderness or unilateral leg swelling  Skin:     General: Skin is warm and dry  Coloration: Skin is not jaundiced  Findings: No rash  Neurological:      General: No focal deficit present  Mental Status: She is alert and oriented to person, place, and time  Sensory: No sensory deficit  Motor: Motor function is intact  Psychiatric:      Comments: Anxious           Vital Signs  ED Triage Vitals [05/13/21 1123]   Temperature Pulse Respirations Blood Pressure SpO2   (!) 97 2 °F (36 2 °C) 98 20 (!) 158/107 99 %      Temp Source Heart Rate Source Patient Position - Orthostatic VS BP Location FiO2 (%)   Rectal Monitor Lying Left arm --      Pain Score       Worst Possible Pain           Vitals:    05/13/21 1123 05/13/21 1156 05/13/21 1259   BP: (!) 158/107 108/82 143/76   Pulse: 98 102 97   Patient Position - Orthostatic VS: Lying           Visual Acuity      ED Medications  Medications   promethazine (PHENERGAN) injection 25 mg (has no administration in time range)   sodium chloride 0 9 % bolus 1,000 mL (0 mL Intravenous Stopped 5/13/21 1344)   ondansetron (ZOFRAN) injection 4 mg (4 mg Intravenous Given 5/13/21 1149)   morphine (PF) 4 mg/mL injection 4 mg (4 mg Intravenous Given 5/13/21 1148)       Diagnostic Studies  Results Reviewed     Procedure Component Value Units Date/Time    Urine Microscopic [[id number]]  (Normal) Collected: 05/13/21 1145    Lab Status: Final result Specimen: Urine, Clean Catch Updated: 05/13/21 1246     RBC, UA 0-1 /hpf      WBC, UA 1-2 /hpf      Epithelial Cells Occasional /hpf      Bacteria, UA Occasional /hpf     TSH [[id number]]  (Normal) Collected: 05/13/21 1140    Lab Status: Final result Specimen: Blood from Arm, Right Updated: 05/13/21 1229     TSH 3RD GENERATON 1 965 uIU/mL     Narrative:      Patients undergoing fluorescein dye angiography may retain small amounts of fluorescein in the body for 48-72 hours post procedure  Samples containing fluorescein can produce falsely depressed TSH values  If the patient had this procedure,a specimen should be resubmitted post fluorescein clearance        B-Type Natriuretic Peptide [facility] ONLY) [[id number]]  (Normal) Collected: 05/13/21 1140    Lab Status: Final result Specimen: Blood from Arm, Right Updated: 05/13/21 1227     BNP 81 9 pg/mL     Troponin I [[id number]]  (Normal) Collected: 05/13/21 1140    Lab Status: Final result Specimen: Blood from Arm, Right Updated: 05/13/21 1215     Troponin I <0 03 ng/mL     Comprehensive metabolic panel [[id number]]  (Abnormal) Collected: 05/13/21 1140    Lab Status: Final result Specimen: Blood from Arm, Right Updated: 05/13/21 1214     Sodium 140 mmol/L      Potassium 4 7 mmol/L      Chloride 104 mmol/L      CO2 27 mmol/L      ANION GAP 9 mmol/L      BUN 19 mg/dL      Creatinine 1 71 mg/dL      Glucose 161 mg/dL      Calcium 9 8 mg/dL      AST 13 U/L      ALT 11 U/L      Alkaline Phosphatase 134 6 U/L      Total Protein 8 1 g/dL      Albumin 3 8 g/dL      Total Bilirubin 0 43 mg/dL      eGFR 32 ml/min/1 73sq m     Narrative:      [place] guidelines for Chronic Kidney Disease (CKD):   Stage 1 with normal or high GFR (GFR > 90 mL/min/1 73 square meters)    Stage 2 Mild CKD (GFR = 60-89 mL/min/1 73 square meters)    Stage 3A Moderate CKD (GFR = 45-59 mL/min/1 73 square meters)    Stage 3B Moderate CKD (GFR = 30-44 mL/min/1 73 square meters)    Stage 4 Severe CKD (GFR = 15-29 mL/min/1 73 square meters)    Stage 5 End Stage CKD (GFR <15 mL/min/1 73 square meters)  Note: GFR calculation is accurate only with a steady state creatinine    Lipase [[id number]]  (Normal) Collected: 05/13/21 1140    Lab Status: Final result Specimen: Blood from Arm, Right Updated: 05/13/21 1214     Lipase 24 u/L     Lactic acid [[id number]]  (Normal) Collected: 05/13/21 1144    Lab Status: Final result Specimen: Blood from Arm, Right Updated: 05/13/21 1212     LACTIC ACID 1 5 mmol/L     Narrative:      Result may be elevated if tourniquet was used during collection      Protime-INR [[id number]]  (Abnormal) Collected: 05/13/21 1140    Lab Status: Final result Specimen: Blood from Arm, Right Updated: 05/13/21 1204     Protime 14 8 seconds      INR 1 33    Narrative:      INR Reference Ranges:  No Anticoagulant, Normal:           0 9-1 1  Standard Dose, Oral Anticoagulant:  2 0-3 0  High Dose, Oral Anticoagulant:      2 5-3 5    APTT [[id number]]  (Normal) Collected: 05/13/21 1140    Lab Status: Final result Specimen: Blood from Arm, Right Updated: 05/13/21 1204     PTT 31 seconds     UA w Reflex to Microscopic w Reflex to Culture [[id number]]  (Abnormal) Collected: 05/13/21 1145    Lab Status: Final result Specimen: Urine, Clean Catch Updated: 05/13/21 1155     Color, UA Yellow     Clarity, UA Clear     Specific Gravity, UA 1 015     pH, UA 8 0     Leukocytes, UA Negative     Nitrite, UA Negative     Protein, UA Negative mg/dl      Glucose, UA Negative mg/dl      Ketones, UA Negative mg/dl      Urobilinogen, UA 0 2 E U /dl      Bilirubin, UA Negative     Blood, UA Trace-Intact    CBC and differential [[id number]] (Abnormal) Collected: 05/13/21 1140    Lab Status: Final result Specimen: Blood from Arm, Right Updated: 05/13/21 1152     WBC 9 43 Thousand/uL      RBC 4 05 Million/uL      Hemoglobin 12 9 g/dL      Hematocrit 41 5 %       fL      MCH 31 9 pg      MCHC 31 1 g/dL      RDW 15 4 %      MPV 9 6 fL      Platelets 674 Thousands/uL      Neutrophils Relative 80 %      Immat GRANS % 0 %      Lymphocytes Relative 12 %      Monocytes Relative 7 %      Eosinophils Relative 1 %      Basophils Relative 0 %      Neutrophils Absolute 7 55 Thousands/µL      Immature Grans Absolute 0 03 Thousand/uL      Lymphocytes Absolute 1 09 Thousands/µL      Monocytes Absolute 0 63 Thousand/µL      Eosinophils Absolute 0 11 Thousand/µL      Basophils Absolute 0 02 Thousands/µL                  CT chest abdomen pelvis wo contrast   Final Result by Brooke Amador MD (05/13 1312)      No acute pathology identified in the chest, abdomen, pelvis  Unchanged multiple adjacent large ventral abdominal wall hernias containing multiple small bowel loops, without obstruction (series 201 images  )            Workstation performed: [id number]                    Procedures  ECG 12 Lead Documentation Only    Date/Time: 5/13/2021 11:40 AM  Performed by: Michelle Olivas MD  Authorized by: Michelle Olivas MD     Comments:      Atrial fibrillation at 105 beats per minute  Incomplete right bundle branch block  Low voltage  Nonspecific T-wave abnormality  Abnormal EKG  No ST elevations  ED Course                                           MDM  Number of Diagnoses or Management Options  Diagnosis management comments: CT scan showed ventral hernias  No obstruction  Otherwise no acute disease  Laboratory evaluation was nonspecific  This is not acute myocardial infarction  Troponin was negative  Patient clearly has epigastric tenderness on examination  I suspect this is gastritis  Will place patient on Protonix    Patient does feel improved after ED treatment  Will discharge on an antiemetic  Will refer to General surgery and have patient follow-up closely with her primary MD  Return to emergency room if condition worsens  Patient does not have an acute abdomen  Amount and/or Complexity of Data Reviewed  Clinical lab tests: ordered and reviewed  Tests in the radiology section of CPT®: ordered and reviewed  Independent visualization of images, tracings, or specimens: yes        Disposition  Final diagnoses:   Nausea and vomiting   Gastritis   Ventral hernia     Time reflects when diagnosis was documented in both MDM as applicable and the Disposition within this note     Time User Action Codes Description Comment    5/13/2021  1:57 PM Tobi Lopez [R11 2] Nausea and vomiting     5/13/2021  1:57 PM Tobi Calix Add [K29 70] Gastritis     5/13/2021  2:01 PM Tobi Lopez [K43 9] Ventral hernia       ED Disposition     ED Disposition Condition Date/Time Comment    Discharge Stable Thu May 13, 2021  1:56 PM Jhony Damon discharge to home/self care              Follow-up Information     Follow up With Specialties Details Why New Symone Matt MD Family Medicine In 2 days  [address]  [address]   Cathie Manning MD General Surgery In 1 week  [address]  [address] [place] [address]  [phone]            Patient's Medications   Discharge Prescriptions    ONDANSETRON (ZOFRAN-ODT) 4 MG DISINTEGRATING TABLET    Take 1 tablet (4 mg total) by mouth every 8 (eight) hours as needed for nausea or vomiting       Start Date: 5/13/2021 End Date: --       Order Dose: 4 mg       Quantity: 30 tablet    Refills: 0    PANTOPRAZOLE (PROTONIX) 40 MG TABLET    Take 1 tablet (40 mg total) by mouth daily       Start Date: 5/13/2021 End Date: --       Order Dose: 40 mg       Quantity: 30 tablet    Refills: 0    PROMETHAZINE (PHENERGAN) 25 MG TABLET    Take 1 tablet (25 mg total) by mouth every 6 (six) hours as needed for nausea or vomiting       Start Date: 5/13/2021 End Date: --       Order Dose: 25 mg       Quantity: 30 tablet    Refills: 0     No discharge procedures on file      PDMP Review       Value Time User    PDMP Reviewed  Yes 5/7/2021  1:02 PM Pranav Lu MD          ED Provider  Electronically Signed by           Luli Leong MD  05/13/21 9140

## 2021-05-13 NOTE — ED NOTES
Patient repositioned in bed, side rails up x 2, bed in locked position, call bell within reach       Palmira Appiah RN  05/13/21 0460

## 2021-05-25 ENCOUNTER — TELEPHONE (OUTPATIENT)
Dept: NEPHROLOGY | Facility: CLINIC | Age: 61
End: 2021-05-25

## 2021-05-25 NOTE — TELEPHONE ENCOUNTER
I called and left message asking patient to call back to schedule [facility] follow up appt w/ Dr Gale Patton

## 2021-06-24 ENCOUNTER — OFFICE VISIT (OUTPATIENT)
Dept: FAMILY MEDICINE CLINIC | Facility: CLINIC | Age: 61
End: 2021-06-24
Payer: MEDICARE

## 2021-06-24 VITALS
OXYGEN SATURATION: 97 % | HEIGHT: 61 IN | BODY MASS INDEX: 54.94 KG/M2 | RESPIRATION RATE: 18 BRPM | TEMPERATURE: 98.9 F | WEIGHT: 291 LBS | SYSTOLIC BLOOD PRESSURE: 128 MMHG | DIASTOLIC BLOOD PRESSURE: 70 MMHG | HEART RATE: 90 BPM

## 2021-06-24 DIAGNOSIS — J44.9 COPD WITH ASTHMA (HCC): ICD-10-CM

## 2021-06-24 DIAGNOSIS — L30.9 ECZEMA OF BOTH HANDS: Primary | ICD-10-CM

## 2021-06-24 DIAGNOSIS — F41.9 ANXIETY: ICD-10-CM

## 2021-06-24 DIAGNOSIS — F41.0 PANIC ATTACK: ICD-10-CM

## 2021-06-24 DIAGNOSIS — K21.9 GASTROESOPHAGEAL REFLUX DISEASE WITHOUT ESOPHAGITIS: ICD-10-CM

## 2021-06-24 DIAGNOSIS — E03.8 OTHER SPECIFIED HYPOTHYROIDISM: ICD-10-CM

## 2021-06-24 DIAGNOSIS — E66.01 MORBID (SEVERE) OBESITY DUE TO EXCESS CALORIES (HCC): ICD-10-CM

## 2021-06-24 DIAGNOSIS — I48.91 ATRIAL FIBRILLATION, UNSPECIFIED TYPE (HCC): ICD-10-CM

## 2021-06-24 DIAGNOSIS — E78.5 DYSLIPIDEMIA: ICD-10-CM

## 2021-06-24 PROCEDURE — 99214 OFFICE O/P EST MOD 30 MIN: CPT | Performed by: INTERNAL MEDICINE

## 2021-06-24 RX ORDER — ATORVASTATIN CALCIUM 10 MG/1
10 TABLET, FILM COATED ORAL DAILY
Qty: 90 TABLET | Refills: 1 | Status: SHIPPED | OUTPATIENT
Start: 2021-06-24 | End: 2021-11-18 | Stop reason: SDUPTHER

## 2021-06-24 RX ORDER — WARFARIN SODIUM 3 MG/1
3 TABLET ORAL
Qty: 90 TABLET | Refills: 0 | Status: SHIPPED | OUTPATIENT
Start: 2021-06-24 | End: 2021-08-12 | Stop reason: HOSPADM

## 2021-06-24 RX ORDER — TRIAMCINOLONE ACETONIDE 5 MG/G
CREAM TOPICAL 3 TIMES DAILY
Qty: 45 G | Refills: 1 | Status: SHIPPED | OUTPATIENT
Start: 2021-06-24 | End: 2021-08-12 | Stop reason: HOSPADM

## 2021-06-24 RX ORDER — FAMOTIDINE 20 MG/1
20 TABLET, FILM COATED ORAL DAILY
Qty: 90 TABLET | Refills: 1 | Status: SHIPPED | OUTPATIENT
Start: 2021-06-24 | End: 2021-12-27

## 2021-06-24 RX ORDER — LEVOTHYROXINE SODIUM 0.1 MG/1
100 TABLET ORAL
Qty: 90 TABLET | Refills: 3 | Status: SHIPPED | OUTPATIENT
Start: 2021-06-24 | End: 2021-08-05 | Stop reason: SDUPTHER

## 2021-06-24 RX ORDER — LORAZEPAM 0.5 MG/1
0.5 TABLET ORAL EVERY 8 HOURS PRN
Qty: 90 TABLET | Refills: 1 | Status: SHIPPED | OUTPATIENT
Start: 2021-06-24 | End: 2021-08-12 | Stop reason: HOSPADM

## 2021-06-24 NOTE — PROGRESS NOTES
Assessment/Plan:  1  Hypertension under control  2  Chronic atrial fibrillation on chronic anticoagulation  3  Diabetes mellitus type 2 no hypoglycemia, last A1c was 6 3  4  Anemia due to iron-deficiency, last hemoglobin was 12 9 which has come up from previous of 10  5  Chronic obstructive lung disease  6  Morbid obesity  she is holding her weight down  7  Degenerative joint disease involving major joints  8  Diabetic peripheral neuropathy  9  Hypothyroidism she looks euthyroid  10  MVP eczema was prescribed triamcinolone cream       Problem List Items Addressed This Visit        Digestive    GERD (gastroesophageal reflux disease)    Relevant Medications    famotidine (PEPCID) 20 mg tablet       Endocrine    Hypothyroid (Chronic)    Relevant Medications    levothyroxine 100 mcg tablet       Respiratory    COPD with asthma (HCC) (Chronic)       Other    Dyslipidemia    Relevant Medications    atorvastatin (LIPITOR) 10 mg tablet    Anxiety    Relevant Medications    LORazepam (ATIVAN) 0 5 mg tablet    Morbid (severe) obesity due to excess calories ([address] )      Other Visit Diagnoses     Eczema of both hands    -  Primary    Relevant Medications    triamcinolone (KENALOG) 0 5 % cream    Atrial fibrillation, unspecified type (HCC)        Relevant Medications    warfarin (COUMADIN) 3 mg tablet    Panic attack                Subjective:      Patient ID: Sendy Sarkar is a 57 y o  female  Tabitha Fee is here for follow-up    She has history of                                                Essential hypertension                                                Chronic obstructive lung disease                                                Chronic atrial fibrillation                                                Diabetes mellitus type 2                                                Diabetic peripheral neuropathy                                                Anxiety disorder Morbid obesity                                                Iron-deficiency anemia                                                Hypothyroidism                                                Gastroesophageal reflux disease                                                Degenerative joint disease involving major joints along with  morbid obesity is causing ambulating dysfunction  She uses  Motorized scooter to ambulate                                                History of hysterectomy med for endometrial cancer                                                History of nephrectomy for renal cell carcinoma   she is overall doing well has no new complaint for took greater except dizziness, no ear note for high problems, no chest pains or shortness of breath, no palpitations, no GI or  issues   she complains of rash and had his fingers dorsum of the hands which looks like repeating eczema    The following portions of the patient's history were reviewed and updated as appropriate:   Past Medical History:  She has a past medical history of Anemia, Arthritis, Cancer of kidney ([facility] [address] ), Chronic kidney disease, Diabetes mellitus ([facility] ), Disease of thyroid gland, HLD (hyperlipidemia), Hypertension, Ovarian cancer ([facility] ), and Pneumonia  ,  _______________________________________________________________________  Medical Problems:  does not have any pertinent problems on file ,  _______________________________________________________________________  Past Surgical History:   has a past surgical history that includes Nephrectomy (Right, 08/02/2018); Hysterectomy (06/01/2018); Gallbladder surgery (05/01/2004); and Cholecystectomy  ,  _______________________________________________________________________  Family History:  family history includes No Known Problems in her father and mother ,  _______________________________________________________________________  Social History:   reports that she quit smoking about 10 years ago  She has a 90 00 pack-year smoking history  She quit smokeless tobacco use about 9 years ago  She reports current drug use  Drug: Marijuana  She reports that she does not drink alcohol ,  _______________________________________________________________________  Allergies:  is allergic to flu virus vaccine     _______________________________________________________________________  Current Outpatient Medications   Medication Sig Dispense Refill    albuterol (2 5 mg/3 mL) 0 083 % nebulizer solution INHALE CONTENTS OF 1 VIAL ( 3 MILLILITERS ) IN NEBULIZER BY MOUTH AND INTO THE LUNGS EVERY 6 HOURS IF NEEDED FOR WHEEZING OR SHORTNESS OF BREATH 75 mL 5    atorvastatin (LIPITOR) 10 mg tablet Take 1 tablet (10 mg total) by mouth daily 90 tablet 1    DULoxetine (CYMBALTA) 30 mg delayed release capsule Take 1 capsule (30 mg total) by mouth 2 (two) times a day 180 capsule 0    levothyroxine 100 mcg tablet Take 1 tablet (100 mcg total) by mouth daily 90 tablet 0    LORazepam (ATIVAN) 0 5 mg tablet Take 1 tablet (0 5 mg total) by mouth every 8 (eight) hours as needed for anxiety 30 tablet 1    metoprolol succinate (TOPROL-XL) 25 mg 24 hr tablet Take 1 tablet (25 mg total) by mouth daily 90 tablet 3    vitamin B-12 (VITAMIN B-12) 500 mcg tablet Take 1 tablet (500 mcg total) by mouth daily 90 tablet 3    cholecalciferol (VITAMIN D3) 400 units tablet Take 1 tablet (400 Units total) by mouth daily 90 tablet 1    famotidine (PEPCID) 20 mg tablet Take 1 tablet (20 mg total) by mouth daily 90 tablet 1    ferrous sulfate 325 (65 Fe) mg tablet Take 325 mg by mouth daily with breakfast (Patient not taking: Reported on 6/24/2021)      furosemide (LASIX) 40 mg tablet Take 1 tablet (40 mg total) by mouth daily Hold Lasix till 10/6/20, Check Blood work BMP on 10/6/20   (Patient not taking: Reported on 1/19/2021)  0    ipratropium (ATROVENT) 0 02 % nebulizer solution Take 1 vial (0 5 mg total) by nebulization 4 (four) times a day 75 mL 5    levothyroxine 100 mcg tablet Take 1 tablet (100 mcg total) by mouth daily in the early morning 90 tablet 3    LORazepam (ATIVAN) 0 5 mg tablet Take 1 tablet (0 5 mg total) by mouth every 8 (eight) hours as needed for anxiety 90 tablet 1    ondansetron (ZOFRAN-ODT) 4 mg disintegrating tablet Take 1 tablet (4 mg total) by mouth every 8 (eight) hours as needed for nausea or vomiting (Patient not taking: Reported on 6/24/2021) 30 tablet 0    pantoprazole (PROTONIX) 40 mg tablet Take 1 tablet (40 mg total) by mouth daily (Patient not taking: Reported on 6/24/2021) 30 tablet 0    promethazine (PHENERGAN) 25 mg tablet Take 1 tablet (25 mg total) by mouth every 6 (six) hours as needed for nausea or vomiting (Patient not taking: Reported on 6/24/2021) 30 tablet 0    triamcinolone (KENALOG) 0 5 % cream Apply topically 3 (three) times a day 45 g 1    warfarin (COUMADIN) 3 mg tablet Take 1 tablet (3 mg total) by mouth daily 90 tablet 0     No current facility-administered medications for this visit      _______________________________________________________________________  Review of Systems   All other systems reviewed and are negative  Objective:  Vitals:    06/24/21 1258   BP: 128/70   BP Location: Left arm   Patient Position: Sitting   Cuff Size: Standard   Pulse: 90   Resp: 18   Temp: 98 9 °F (37 2 °C)   TempSrc: Tympanic   SpO2: 97%   Weight: 132 kg (291 lb)   Height: 5' 1" (1 549 m)     Body mass index is 54 98 kg/m²  Physical Exam  Vitals and nursing note reviewed  Constitutional:       Appearance: She is obese  Comments: She is morbidly obese with BMI 54 98   HENT:      Head: Normocephalic  Right Ear: External ear normal       Left Ear: External ear normal       Nose: Nose normal       Mouth/Throat:      Mouth: Mucous membranes are moist    Eyes:      General: No scleral icterus  Extraocular Movements: Extraocular movements intact        Conjunctiva/sclera: Conjunctivae normal  Pupils: Pupils are equal, round, and reactive to light  Neck:      Vascular: No carotid bruit  Cardiovascular:      Rate and Rhythm: Normal rate and regular rhythm  Pulses: Normal pulses  Heart sounds: Normal heart sounds  No murmur heard  Pulmonary:      Effort: Pulmonary effort is normal       Breath sounds: Normal breath sounds  Abdominal:      General: Bowel sounds are normal  There is distension  Palpations: Abdomen is soft  There is no mass  Tenderness: There is no abdominal tenderness  Hernia: No hernia is present  Musculoskeletal:      Cervical back: Normal range of motion and neck supple  Lymphadenopathy:      Cervical: No cervical adenopathy  Neurological:      Mental Status: She is alert

## 2021-06-28 ENCOUNTER — ANTICOAG VISIT (OUTPATIENT)
Dept: FAMILY MEDICINE CLINIC | Facility: CLINIC | Age: 61
End: 2021-06-28

## 2021-06-28 LAB
INR PPP: 1.5
INR PPP: 1.5 (ref 0.84–1.19)
PROTHROMBIN TIME: 14.9 SEC (ref 9–11.5)

## 2021-07-06 LAB
INR PPP: 1.6
PROTHROMBIN TIME: 16 SEC (ref 9–11.5)

## 2021-07-08 ENCOUNTER — ANTICOAG VISIT (OUTPATIENT)
Dept: FAMILY MEDICINE CLINIC | Facility: CLINIC | Age: 61
End: 2021-07-08

## 2021-07-13 ENCOUNTER — TELEPHONE (OUTPATIENT)
Dept: NEPHROLOGY | Facility: CLINIC | Age: 61
End: 2021-07-13

## 2021-07-13 NOTE — PROGRESS NOTES
RENAL FOLLOW UP NOTE: td     ASSESSMENT AND PLAN:  1  Gen Quintanilla [id number] 3 :  · Etiology:  Right radical nephrectomy July 15, 2016 for renal cell CA/hypertensive nephrosclerosis/arteriolar nephrosclerosis/diabetic nephropathy/?  Morbid obesity with?  FSGS  · Baseline creatinine:  new baseline appears to be from 1 8-as high as 2 4  · Current creatinine:  1 71 at baseline from 05/13/2020  · Urine protein creatinine ratio:  0 36 g at goal  · UA microscopic no proteinuria, trace intact heme from 05/13/2020  Recommendations:  · Treat hypertension-please see below  · Treat dyslipidemia-please see below  · Maintain proteinuria less than 1 g or as low as possible  · Avoid nephrotoxic agents such as NSAIDs, patient counseled as such  2   Volume:  · Current volume:  Euvolemic  · Treatment:  · Only use as needed furosemide if swelling recurs  3   Hypertension:   Home blood pressures:  Typically 120-130/60s     · Goal blood pressure:  Less than 130/80  Recommendations:  ·   push nonmedical regimen including weight loss, and any form of exercise patient can not tolerate; avoidance of salt; patient counseled as such  · Medication changes today:  · No changes pending home readings  4   Electrolytes:  Chronic metabolic alkalosis most likely from primary CO2 retention,, doing well with a bicarbonate of 31  5   Mineral bone disorder:  Secondary to CKD:  · Calcium/magnesium/phosphorus:  All acceptable  · PTH intact:  73 at goal  · Vitamin-D:  47 at goal  6   Dyslipidemia:  · Goal LDL:  Less than 100  · Current lipid profile:  None at this time     Recommendations:  TO OBTAIN LIPID PROFILE  7   Anemia:  Current improved and doing well at 12 9 05/13/2021  -iron studies:  Saturation 25% ferritin 68 but cannot tolerate oral iron because of GI side effects  -B12 and folate deficiency being treated  -multiple myeloma was ruled out in January  -GI evaluation in January of 2018:   colonoscopy demonstrated internal/external hemorrhoids; also colonic polyps which were removed  Imelda Perez was a suboptimal colonic prep   Felt she may require repeat colonoscopy in 3-6 months  EGD demonstrated small hiatal hernia  Recommendations:  -GI follow-up with Dr Garza     8   Other problems:  · Right radical nephrectomy for renal cell CA July 15, 2016  · 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 2, 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  · Morbid obesity  · Atrial fibrillation  · Cellulitis of the left leg back in January treated with intravenous antibiotics  · COPD on iron  · EGD involving multiple joints  · Hypothyroidism on supplement  · History of gait dysfunction using motorized scooter to ambulate  · Diabetes mellitus/diabetic neuropathy             PATIENT INSTRUCTIONS:    Patient Instructions   1  Medication changes today:   No medication changes today  2  Please go for fasting lab work at this time: We will help to arrange for this through [facility]    3  Please take 1 week a blood pressure readings  at this time     AS FOLLOWS  MORNING AND EVENING, SITTING  as follows:  · TAKE THE MORNING READINGS BEFORE ANY MEDICATIONS AND WHEN YOU ARE RELAXED FOR SEVERAL MINUTES  · TAKE THE EVENING READINGS:  BETWEEN 7-10 P M ; PRIOR TO ANY MEDICATIONS; AT LEAST IN OUR  FROM DINNER; AND CERTAINLY AFTER RELAXING FOR A FEW MINUTES  · PLEASE INCLUDE HEART RATE WITH YOUR BLOOD PRESSURE READINGS  · When taking standing readings, keep your arm supported at heart level and not dangling  · Make sure you are sitting with your back supported and feet on the ground and do not cross your legs or feet  · Make sure you have not taken any coffee or caffeine products or exercised or smoke cigarettes at least 30 minutes before taking your blood pressure  Then please mail these readings into the office    4  In 3 months:   Please go for nonfasting lab work but in the morning   Please take 1 week a blood pressure readings as outlined above and mail those into the office      5  Follow-up in 6  months   Please bring in 1 week a blood pressure readings morning evening, sitting and standing is outlined above   PLEASE BRING AN YOUR BLOOD PRESSURE MACHINE TO CORRELATE WITH THE OFFICE MACHINE AT THIS NEXT SCHEDULED VISIT   Please go for fasting lab work 1-2 weeks prior to your appointment      6  General instructions:   AVOID SALT BUT NOT ADDING AN READING LABELS TO MAKE SURE THERE IS LOW-SALT IN THE FOOD THAT YOU ARE EATING  o Goal is less than 2 g of sodium intake or less than 5 g of sodium chloride intake per day     Avoid nonsteroidal anti-inflammatory drugs such as Naprosyn, ibuprofen, Aleve, Advil, Celebrex, Meloxicam (Mobic) etc   You can use Tylenol as needed if you do not have any liver condition to be concerned about     Avoid medications such as Sudafed or decongestants and antihistamines that contained the D component which is the decongestant  You can take antihistamines without the decongestant or D component   Try to avoid medications such as pantoprazole or  Protonix/Nexium or Esomeprazole)/Prilosec or omeprazole/Prevacid or lansoprazole/AcipHex or Rabeprazole  If you are able to, use Pepcid as this is safer for your kidneys   Try to remain as active as possible     Try to lose 10 lb by your next visit     Please do not drink more than 2 glasses of alcohol/wine on a daily basis as this may contribute to your high blood pressure  Subjective: There has been no hospitalizations or acute illnesses since last visit  The patient overall is feeling well  No fevers, chills, or cough or colds    Good appetite and good energy  No hematuria, dysuria, voiding symptoms or foamy urine  No gastrointestinal symptoms, occasional nausea associated with some anxiety  No chest pain, chronic dyspnea on exertion unchanged oxygen;, no swelling  No headaches, dizziness or lightheadedness  Blood pressure medications:   Toprol XL 25 mg daily      ROS:  See HPI, otherwise review of systems as completely reviewed with the patient are negative    Past Medical History:   Diagnosis Date    Anemia     Arthritis     [facility] ([facility] [address] )     Chronic kidney disease     Diabetes mellitus ([facility] )     Disease of thyroid gland     HLD (hyperlipidemia)     Hypertension     Ovarian cancer ([facility] [address] )     Pneumonia      Past Surgical History:   Procedure Laterality Date    CHOLECYSTECTOMY      GALLBLADDER SURGERY  2004    HYSTERECTOMY  2018    NEPHRECTOMY Right 2018     Family History   Problem Relation Age of Onset    No Known Problems Mother     No Known Problems Father       reports that she quit smoking about 10 years ago  She has a 90 00 pack-year smoking history  She quit smokeless tobacco use about 9 years ago  She reports current drug use  Drug: Marijuana  She reports that she does not drink alcohol  I COMPLETELY REVIEWED THE PAST MEDICAL HISTORY/PAST SURGICAL HISTORY/SOCIAL HISTORY/FAMILY HISTORY/AND MEDICATIONS  AND UPDATED ALL    Objective:     Vitals:   BP sittin/94? Very difficult to auscultate the diastolic readings, heart rate 80 but irregular  Not able to take BP standing essentially chair bound    Weight (last 2 days)     None        Wt Readings from Last 3 Encounters:   21 132 kg (291 lb)   21 134 kg (295 lb 6 7 oz)   21 (!) 140 kg (309 lb)       Body mass index is 54 98 kg/m²      Physical Exam: General:  No acute distress/morbidly obese  Skin:  No acute rash  Eyes:  No scleral icterus, noninjected, no discharge from eyes  ENT:  Moist mucous membranes  Neck:  Supple, no jugular venous distention, trachea is midline, no lymphadenopathy and no thyromegaly  Back   No CVAT  Chest:  Clear to auscultation and percussion, good respiratory effort  CVS:  Irregular rhythm without a rub, or gallops or murmurs  Abdomen:  Obese/Soft and nontender with normal bowel sounds/hernias which are reducible  Extremities:  No cyanosis and no edema, no arthritic changes, normal range of motion, venous stasis changes  Neuro:  Grossly intact  Psych:  Alert, oriented x3 and appropriate      Medications:    Current Outpatient Medications:     albuterol (2 5 mg/3 mL) 0 083 % nebulizer solution, INHALE CONTENTS OF 1 VIAL ( 3 MILLILITERS ) IN NEBULIZER BY MOUTH AND INTO THE LUNGS EVERY 6 HOURS IF NEEDED FOR WHEEZING OR SHORTNESS OF BREATH, Disp: 75 mL, Rfl: 5    atorvastatin (LIPITOR) 10 mg tablet, Take 1 tablet (10 mg total) by mouth daily, Disp: 90 tablet, Rfl: 1    cholecalciferol (VITAMIN D3) 400 units tablet, Take 1 tablet (400 Units total) by mouth daily, Disp: 90 tablet, Rfl: 1    DULoxetine (CYMBALTA) 30 mg delayed release capsule, Take 1 capsule (30 mg total) by mouth 2 (two) times a day, Disp: 180 capsule, Rfl: 0    famotidine (PEPCID) 20 mg tablet, Take 1 tablet (20 mg total) by mouth daily, Disp: 90 tablet, Rfl: 1    ferrous sulfate 325 (65 Fe) mg tablet, Take 325 mg by mouth daily with breakfast , Disp: , Rfl:     ipratropium (ATROVENT) 0 02 % nebulizer solution, Take 1 vial (0 5 mg total) by nebulization 4 (four) times a day, Disp: 75 mL, Rfl: 5    levothyroxine 100 mcg tablet, Take 1 tablet (100 mcg total) by mouth daily in the early morning, Disp: 90 tablet, Rfl: 3    LORazepam (ATIVAN) 0 5 mg tablet, Take 1 tablet (0 5 mg total) by mouth every 8 (eight) hours as needed for anxiety, Disp: 30 tablet, Rfl: 1    LORazepam (ATIVAN) 0 5 mg tablet, Take 1 tablet (0 5 mg total) by mouth every 8 (eight) hours as needed for anxiety, Disp: 90 tablet, Rfl: 1    metoprolol succinate (TOPROL-XL) 25 mg 24 hr tablet, Take 1 tablet (25 mg total) by mouth daily, Disp: 90 tablet, Rfl: 3    triamcinolone (KENALOG) 0 5 % cream, Apply topically 3 (three) times a day, Disp: 45 g, Rfl: 1    vitamin B-12 (VITAMIN B-12) 500 mcg tablet, Take 1 tablet (500 mcg total) by mouth daily, Disp: 90 tablet, Rfl: 3    warfarin (COUMADIN) 3 mg tablet, Take 1 tablet (3 mg total) by mouth daily, Disp: 90 tablet, Rfl: 0    levothyroxine 100 mcg tablet, Take 1 tablet (100 mcg total) by mouth daily, Disp: 90 tablet, Rfl: 0    Lab, Imaging and other studies: I have personally reviewed pertinent labs  Laboratory Results:  Results for orders placed or performed in visit on 07/06/21   Protime-INR   Result Value Ref Range    INR 1 6 (H)     Prothrombin Time 16 0 (H) 9 0 - 11 5 sec             Invalid input(s): ALBUMIN      Radiology review:   chest X-ray    Ultrasound      Portions of the record may have been created with voice recognition software  Occasional wrong word or "sound a like" substitutions may have occurred due to the inherent limitations of voice recognition software  Read the chart carefully and recognize, using context, where substitutions have occurred

## 2021-07-13 NOTE — TELEPHONE ENCOUNTER
----- Message from Ramin Phan MD sent at 7/13/2021 10:57 AM EDT -----  Done thanks!  ----- Message -----  From: Karen Burkett  Sent: 7/13/2021  10:20 AM EDT  To: Ramin Phan MD    Please place orders in epic so I can send [facility]      Thank you    ----- Message -----  From: Ramin Phan MD  Sent: 7/13/2021   9:20 AM EDT  To: [facility]    The patient needs lab work prior to her appointment this coming week last labs looks like they were in the beginning of May  Thank you

## 2021-07-16 ENCOUNTER — OFFICE VISIT (OUTPATIENT)
Dept: NEPHROLOGY | Facility: CLINIC | Age: 61
End: 2021-07-16
Payer: MEDICARE

## 2021-07-16 VITALS — HEIGHT: 61 IN | BODY MASS INDEX: 54.98 KG/M2

## 2021-07-16 DIAGNOSIS — R80.1 PERSISTENT PROTEINURIA: ICD-10-CM

## 2021-07-16 DIAGNOSIS — Z90.5 H/O RIGHT NEPHRECTOMY: ICD-10-CM

## 2021-07-16 DIAGNOSIS — E61.1 IRON DEFICIENCY: ICD-10-CM

## 2021-07-16 DIAGNOSIS — J44.9 COPD WITH ASTHMA (HCC): Primary | ICD-10-CM

## 2021-07-16 DIAGNOSIS — D63.1 ANEMIA DUE TO STAGE 3B CHRONIC KIDNEY DISEASE (HCC): ICD-10-CM

## 2021-07-16 DIAGNOSIS — N18.32 ANEMIA DUE TO STAGE 3B CHRONIC KIDNEY DISEASE (HCC): ICD-10-CM

## 2021-07-16 DIAGNOSIS — N18.32 STAGE 3B CHRONIC KIDNEY DISEASE (HCC): ICD-10-CM

## 2021-07-16 DIAGNOSIS — N25.81 SECONDARY HYPERPARATHYROIDISM OF RENAL ORIGIN (HCC): ICD-10-CM

## 2021-07-16 DIAGNOSIS — I12.9 HYPERTENSIVE CHRONIC KIDNEY DISEASE WITH STAGE 1 THROUGH STAGE 4 CHRONIC KIDNEY DISEASE, OR UNSPECIFIED CHRONIC KIDNEY DISEASE: Primary | ICD-10-CM

## 2021-07-16 PROCEDURE — 99214 OFFICE O/P EST MOD 30 MIN: CPT | Performed by: INTERNAL MEDICINE

## 2021-07-16 RX ORDER — ALBUTEROL SULFATE 90 UG/1
AEROSOL, METERED RESPIRATORY (INHALATION)
Qty: 8.5 G | Refills: 2 | Status: SHIPPED | OUTPATIENT
Start: 2021-07-16 | End: 2021-11-18 | Stop reason: SDUPTHER

## 2021-07-16 NOTE — LETTER
July 16, 2021     Tejal Gan, [address] [facility] [address] [address]    Patient: Celia Finn   YOB: 1960   Date of Visit: 7/16/2021       Dear Dr Ortiz Primer:    Thank you for referring Rosemarie Lynn to me for evaluation  Below are my notes for this consultation  If you have questions, please do not hesitate to call me  I look forward to following your patient along with you           Sincerely,        Gustavo Escalera MD        CC: No Recipients  Gustavo Escalera MD  7/16/2021  2:22 PM  Sign when Signing Visit  RENAL FOLLOW UP NOTE: td     ASSESSMENT AND PLAN:  1  Sol Alberto [id number] 0 :  · Etiology:  Right radical nephrectomy July 15, 2016 for renal cell CA/hypertensive nephrosclerosis/arteriolar nephrosclerosis/diabetic nephropathy/?  Morbid obesity with?  FSGS  · Baseline creatinine:  new baseline appears to be from 1 8-as high as 2 4  · Current creatinine:  1 71 at baseline from 05/13/2020  · Urine protein creatinine ratio:  0 36 g at goal  · UA microscopic no proteinuria, trace intact heme from 05/13/2020  Recommendations:  · Treat hypertension-please see below  · Treat dyslipidemia-please see below  · Maintain proteinuria less than 1 g or as low as possible  · Avoid nephrotoxic agents such as NSAIDs, patient counseled as such  2   Volume:  · Current volume:  Euvolemic  · Treatment:  · Only use as needed furosemide if swelling recurs  3   Hypertension:   Home blood pressures:  Typically 120-130/60s     · Goal blood pressure:  Less than 130/80  Recommendations:  ·   push nonmedical regimen including weight loss, and any form of exercise patient can not tolerate; avoidance of salt; patient counseled as such  · Medication changes today:  · No changes pending home readings  4   Electrolytes:  Chronic metabolic alkalosis most likely from primary CO2 retention,, doing well with a bicarbonate of 31  5   Mineral bone disorder:  Secondary to CKD:  · Calcium/magnesium/phosphorus:  All acceptable  · PTH intact:  73 at goal  · Vitamin-D:  47 at goal  6   Dyslipidemia:  · Goal LDL:  Less than 100  · Current lipid profile:  None at this time     Recommendations:  TO OBTAIN LIPID PROFILE  7   Anemia:  Current improved and doing well at 12 9 05/13/2021  -iron studies:  Saturation 25% ferritin 68 but cannot tolerate oral iron because of GI side effects  -B12 and folate deficiency being treated  -multiple myeloma was ruled out in January  -GI evaluation in January of 2018:   colonoscopy demonstrated internal/external hemorrhoids; also colonic polyps which were removed  Marolyn Expose was a suboptimal colonic prep   Felt she may require repeat colonoscopy in 3-6 months  EGD demonstrated small hiatal hernia  Recommendations:  -GI follow-up with Dr Garza     8   Other problems:  · Right radical nephrectomy for renal cell CA July 15, 2016  · 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 2, 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  · Morbid obesity  · Atrial fibrillation  · Cellulitis of the left leg back in January treated with intravenous antibiotics  · COPD on iron  · EGD involving multiple joints  · Hypothyroidism on supplement  · History of gait dysfunction using motorized scooter to ambulate  · Diabetes mellitus/diabetic neuropathy             PATIENT INSTRUCTIONS:    Patient Instructions   1  Medication changes today:   No medication changes today  2  Please go for fasting lab work at this time: We will help to arrange for this through [facility]    3   Please take 1 week a blood pressure readings  at this time     AS FOLLOWS  MORNING AND EVENING, SITTING  as follows:  · TAKE THE MORNING READINGS BEFORE ANY MEDICATIONS AND WHEN YOU ARE RELAXED FOR SEVERAL MINUTES  · TAKE THE EVENING READINGS:  BETWEEN 7-10 P M ; PRIOR TO ANY MEDICATIONS; AT LEAST IN OUR  FROM DINNER; AND CERTAINLY AFTER RELAXING FOR A FEW MINUTES  · PLEASE INCLUDE HEART RATE WITH YOUR BLOOD PRESSURE READINGS  · When taking standing readings, keep your arm supported at heart level and not dangling  · Make sure you are sitting with your back supported and feet on the ground and do not cross your legs or feet  · Make sure you have not taken any coffee or caffeine products or exercised or smoke cigarettes at least 30 minutes before taking your blood pressure  Then please mail these readings into the office    4  In 3 months:   Please go for nonfasting lab work but in the morning   Please take 1 week a blood pressure readings as outlined above and mail those into the office      5  Follow-up in 6  months   Please bring in 1 week a blood pressure readings morning evening, sitting and standing is outlined above   PLEASE BRING AN YOUR BLOOD PRESSURE MACHINE TO CORRELATE WITH THE OFFICE MACHINE AT THIS NEXT SCHEDULED VISIT   Please go for fasting lab work 1-2 weeks prior to your appointment      6  General instructions:   AVOID SALT BUT NOT ADDING AN READING LABELS TO MAKE SURE THERE IS LOW-SALT IN THE FOOD THAT YOU ARE EATING  o Goal is less than 2 g of sodium intake or less than 5 g of sodium chloride intake per day     Avoid nonsteroidal anti-inflammatory drugs such as Naprosyn, ibuprofen, Aleve, Advil, Celebrex, Meloxicam (Mobic) etc   You can use Tylenol as needed if you do not have any liver condition to be concerned about     Avoid medications such as Sudafed or decongestants and antihistamines that contained the D component which is the decongestant  You can take antihistamines without the decongestant or D component   Try to avoid medications such as pantoprazole or  Protonix/Nexium or Esomeprazole)/Prilosec or omeprazole/Prevacid or lansoprazole/AcipHex or Rabeprazole  If you are able to, use Pepcid as this is safer for your kidneys       Try to remain as active as possible     Try to lose 10 lb by your next visit     Please do not drink more than 2 glasses of alcohol/wine on a daily basis as this may contribute to your high blood pressure  Subjective: There has been no hospitalizations or acute illnesses since last visit  The patient overall is feeling well  No fevers, chills, or cough or colds  Good appetite and good energy  No hematuria, dysuria, voiding symptoms or foamy urine  No gastrointestinal symptoms, occasional nausea associated with some anxiety  No chest pain, chronic dyspnea on exertion unchanged oxygen;, no swelling  No headaches, dizziness or lightheadedness  Blood pressure medications:   Toprol XL 25 mg daily      ROS:  See HPI, otherwise review of systems as completely reviewed with the patient are negative    Past Medical History:   Diagnosis Date    Anemia     Arthritis     [facility] kidney [facility] [address] [facility]     Chronic kidney disease     Diabetes mellitus ([facility] )     Disease of thyroid gland     HLD (hyperlipidemia)     Hypertension     Ovarian cancer ([facility] )     Pneumonia      Past Surgical History:   Procedure Laterality Date    CHOLECYSTECTOMY      GALLBLADDER SURGERY  2004    HYSTERECTOMY  2018    NEPHRECTOMY Right 2018     Family History   Problem Relation Age of Onset    No Known Problems Mother     No Known Problems Father       reports that she quit smoking about 10 years ago  She has a 90 00 pack-year smoking history  She quit smokeless tobacco use about 9 years ago  She reports current drug use  Drug: Marijuana  She reports that she does not drink alcohol  I COMPLETELY REVIEWED THE PAST MEDICAL HISTORY/PAST SURGICAL HISTORY/SOCIAL HISTORY/FAMILY HISTORY/AND MEDICATIONS  AND UPDATED ALL    Objective:     Vitals:   BP sittin/94?   Very difficult to auscultate the diastolic readings, heart rate 80 but irregular  Not able to take BP standing essentially chair bound    Weight (last 2 days)     None        Wt Readings from Last 3 Encounters:   21 132 kg (291 lb)   21 134 kg (295 lb 6 7 oz)   21 (!) 140 kg (309 lb)       Body mass index is 54 98 kg/m²      Physical Exam: General:  No acute distress/morbidly obese  Skin:  No acute rash  Eyes:  No scleral icterus, noninjected, no discharge from eyes  ENT:  Moist mucous membranes  Neck:  Supple, no jugular venous distention, trachea is midline, no lymphadenopathy and no thyromegaly  Back   No CVAT  Chest:  Clear to auscultation and percussion, good respiratory effort  CVS:  Irregular rhythm without a rub, or gallops or murmurs  Abdomen:  Obese/Soft and nontender with normal bowel sounds/hernias which are reducible  Extremities:  No cyanosis and no edema, no arthritic changes, normal range of motion, venous stasis changes  Neuro:  Grossly intact  Psych:  Alert, oriented x3 and appropriate      Medications:    Current Outpatient Medications:     albuterol (2 5 mg/3 mL) 0 083 % nebulizer solution, INHALE CONTENTS OF 1 VIAL ( 3 MILLILITERS ) IN NEBULIZER BY MOUTH AND INTO THE LUNGS EVERY 6 HOURS IF NEEDED FOR WHEEZING OR SHORTNESS OF BREATH, Disp: 75 mL, Rfl: 5    atorvastatin (LIPITOR) 10 mg tablet, Take 1 tablet (10 mg total) by mouth daily, Disp: 90 tablet, Rfl: 1    cholecalciferol (VITAMIN D3) 400 units tablet, Take 1 tablet (400 Units total) by mouth daily, Disp: 90 tablet, Rfl: 1    DULoxetine (CYMBALTA) 30 mg delayed release capsule, Take 1 capsule (30 mg total) by mouth 2 (two) times a day, Disp: 180 capsule, Rfl: 0    famotidine (PEPCID) 20 mg tablet, Take 1 tablet (20 mg total) by mouth daily, Disp: 90 tablet, Rfl: 1    ferrous sulfate 325 (65 Fe) mg tablet, Take 325 mg by mouth daily with breakfast , Disp: , Rfl:     ipratropium (ATROVENT) 0 02 % nebulizer solution, Take 1 vial (0 5 mg total) by nebulization 4 (four) times a day, Disp: 75 mL, Rfl: 5    levothyroxine 100 mcg tablet, Take 1 tablet (100 mcg total) by mouth daily in the early morning, Disp: 90 tablet, Rfl: 3    LORazepam (ATIVAN) 0 5 mg tablet, Take 1 tablet (0 5 mg total) by mouth every 8 (eight) hours as needed for anxiety, Disp: 30 tablet, Rfl: 1    LORazepam (ATIVAN) 0 5 mg tablet, Take 1 tablet (0 5 mg total) by mouth every 8 (eight) hours as needed for anxiety, Disp: 90 tablet, Rfl: 1    metoprolol succinate (TOPROL-XL) 25 mg 24 hr tablet, Take 1 tablet (25 mg total) by mouth daily, Disp: 90 tablet, Rfl: 3    triamcinolone (KENALOG) 0 5 % cream, Apply topically 3 (three) times a day, Disp: 45 g, Rfl: 1    vitamin B-12 (VITAMIN B-12) 500 mcg tablet, Take 1 tablet (500 mcg total) by mouth daily, Disp: 90 tablet, Rfl: 3    warfarin (COUMADIN) 3 mg tablet, Take 1 tablet (3 mg total) by mouth daily, Disp: 90 tablet, Rfl: 0    levothyroxine 100 mcg tablet, Take 1 tablet (100 mcg total) by mouth daily, Disp: 90 tablet, Rfl: 0    Lab, Imaging and other studies: I have personally reviewed pertinent labs  Laboratory Results:  Results for orders placed or performed in visit on 07/06/21   Protime-INR   Result Value Ref Range    INR 1 6 (H)     Prothrombin Time 16 0 (H) 9 0 - 11 5 sec             Invalid input(s): ALBUMIN      Radiology review:   chest X-ray    Ultrasound      Portions of the record may have been created with voice recognition software  Occasional wrong word or "sound a like" substitutions may have occurred due to the inherent limitations of voice recognition software  Read the chart carefully and recognize, using context, where substitutions have occurred

## 2021-07-16 NOTE — PATIENT INSTRUCTIONS
1  Medication changes today:   No medication changes today  2  Please go for fasting lab work at this time: We will help to arrange for this through [facility]    3  Please take 1 week a blood pressure readings  at this time     AS FOLLOWS  MORNING AND EVENING, SITTING  as follows:  · TAKE THE MORNING READINGS BEFORE ANY MEDICATIONS AND WHEN YOU ARE RELAXED FOR SEVERAL MINUTES  · TAKE THE EVENING READINGS:  BETWEEN 7-10 P M ; PRIOR TO ANY MEDICATIONS; AT LEAST IN OUR  FROM DINNER; AND CERTAINLY AFTER RELAXING FOR A FEW MINUTES  · PLEASE INCLUDE HEART RATE WITH YOUR BLOOD PRESSURE READINGS  · When taking standing readings, keep your arm supported at heart level and not dangling  · Make sure you are sitting with your back supported and feet on the ground and do not cross your legs or feet  · Make sure you have not taken any coffee or caffeine products or exercised or smoke cigarettes at least 30 minutes before taking your blood pressure  Then please mail these readings into the office    4  In 3 months:   Please go for nonfasting lab work but in the morning   Please take 1 week a blood pressure readings as outlined above and mail those into the office      5  Follow-up in 6  months   Please bring in 1 week a blood pressure readings morning evening, sitting and standing is outlined above   PLEASE BRING AN YOUR BLOOD PRESSURE MACHINE TO CORRELATE WITH THE OFFICE MACHINE AT THIS NEXT SCHEDULED VISIT   Please go for fasting lab work 1-2 weeks prior to your appointment      6   General instructions:   AVOID SALT BUT NOT ADDING AN READING LABELS TO MAKE SURE THERE IS LOW-SALT IN THE FOOD THAT YOU ARE EATING  o Goal is less than 2 g of sodium intake or less than 5 g of sodium chloride intake per day     Avoid nonsteroidal anti-inflammatory drugs such as Naprosyn, ibuprofen, Aleve, Advil, Celebrex, Meloxicam (Mobic) etc   You can use Tylenol as needed if you do not have any liver condition to be concerned about     Avoid medications such as Sudafed or decongestants and antihistamines that contained the D component which is the decongestant  You can take antihistamines without the decongestant or D component   Try to avoid medications such as pantoprazole or  Protonix/Nexium or Esomeprazole)/Prilosec or omeprazole/Prevacid or lansoprazole/AcipHex or Rabeprazole  If you are able to, use Pepcid as this is safer for your kidneys   Try to remain as active as possible     Try to lose 10 lb by your next visit     Please do not drink more than 2 glasses of alcohol/wine on a daily basis as this may contribute to your high blood pressure

## 2021-07-21 DIAGNOSIS — K29.70 GASTRITIS: ICD-10-CM

## 2021-07-21 DIAGNOSIS — E11.49 OTHER DIABETIC NEUROLOGICAL COMPLICATION ASSOCIATED WITH TYPE 2 DIABETES MELLITUS (HCC): ICD-10-CM

## 2021-07-21 RX ORDER — PANTOPRAZOLE SODIUM 40 MG/1
TABLET, DELAYED RELEASE ORAL
Qty: 90 TABLET | Refills: 1 | Status: SHIPPED | OUTPATIENT
Start: 2021-07-21 | End: 2021-08-19 | Stop reason: SDUPTHER

## 2021-07-21 RX ORDER — DULOXETIN HYDROCHLORIDE 30 MG/1
CAPSULE, DELAYED RELEASE ORAL
Qty: 180 CAPSULE | Refills: 0 | Status: SHIPPED | OUTPATIENT
Start: 2021-07-21 | End: 2021-08-05 | Stop reason: SDUPTHER

## 2021-08-02 DIAGNOSIS — I48.11 LONGSTANDING PERSISTENT ATRIAL FIBRILLATION (HCC): Primary | ICD-10-CM

## 2021-08-02 RX ORDER — WARFARIN SODIUM 1 MG/1
1 TABLET ORAL DAILY
Qty: 90 TABLET | Refills: 1 | Status: SHIPPED | OUTPATIENT
Start: 2021-08-02 | End: 2021-11-18 | Stop reason: DRUGHIGH

## 2021-08-02 RX ORDER — WARFARIN SODIUM 1 MG/1
1 TABLET ORAL DAILY
COMMUNITY
End: 2021-08-02 | Stop reason: SDUPTHER

## 2021-08-05 DIAGNOSIS — E11.49 OTHER DIABETIC NEUROLOGICAL COMPLICATION ASSOCIATED WITH TYPE 2 DIABETES MELLITUS (HCC): ICD-10-CM

## 2021-08-05 DIAGNOSIS — E03.8 OTHER SPECIFIED HYPOTHYROIDISM: ICD-10-CM

## 2021-08-05 RX ORDER — DULOXETIN HYDROCHLORIDE 30 MG/1
30 CAPSULE, DELAYED RELEASE ORAL 2 TIMES DAILY
Qty: 180 CAPSULE | Refills: 1 | Status: SHIPPED | OUTPATIENT
Start: 2021-08-05 | End: 2021-11-18 | Stop reason: SDUPTHER

## 2021-08-05 RX ORDER — LEVOTHYROXINE SODIUM 0.1 MG/1
100 TABLET ORAL
Qty: 90 TABLET | Refills: 3 | Status: SHIPPED | OUTPATIENT
Start: 2021-08-05 | End: 2022-07-29

## 2021-08-07 ENCOUNTER — APPOINTMENT (EMERGENCY)
Dept: RADIOLOGY | Facility: HOSPITAL | Age: 61
DRG: 872 | End: 2021-08-07
Payer: MEDICARE

## 2021-08-07 ENCOUNTER — HOSPITAL ENCOUNTER (INPATIENT)
Facility: HOSPITAL | Age: 61
LOS: 4 days | Discharge: HOME HEALTH | DRG: 872 | End: 2021-08-12
Admitting: INTERNAL MEDICINE
Payer: MEDICARE

## 2021-08-07 ENCOUNTER — APPOINTMENT (EMERGENCY)
Dept: CT IMAGING | Facility: HOSPITAL | Age: 61
DRG: 872 | End: 2021-08-07
Payer: MEDICARE

## 2021-08-07 DIAGNOSIS — R10.9 ABDOMINAL PAIN: ICD-10-CM

## 2021-08-07 DIAGNOSIS — D72.829 LEUKOCYTOSIS, UNSPECIFIED TYPE: ICD-10-CM

## 2021-08-07 DIAGNOSIS — D64.9 ANEMIA: Primary | ICD-10-CM

## 2021-08-07 DIAGNOSIS — K92.1 MELENA: ICD-10-CM

## 2021-08-07 DIAGNOSIS — R10.84 GENERALIZED ABDOMINAL PAIN: ICD-10-CM

## 2021-08-07 DIAGNOSIS — I48.20 CHRONIC A-FIB (HCC): ICD-10-CM

## 2021-08-07 DIAGNOSIS — I48.0 PAROXYSMAL ATRIAL FIBRILLATION (HCC): ICD-10-CM

## 2021-08-07 LAB
ALBUMIN SERPL BCP-MCNC: 3.4 G/DL (ref 3.4–4.8)
ALP SERPL-CCNC: 95.8 U/L (ref 35–140)
ALT SERPL W P-5'-P-CCNC: 9 U/L (ref 5–54)
ANION GAP SERPL CALCULATED.3IONS-SCNC: 11 MMOL/L (ref 4–13)
APTT PPP: 49 SECONDS (ref 23–31)
AST SERPL W P-5'-P-CCNC: 12 U/L (ref 15–41)
BACTERIA UR QL AUTO: ABNORMAL /HPF
BASOPHILS # BLD AUTO: 0.04 THOUSANDS/ΜL (ref 0–0.1)
BASOPHILS NFR BLD AUTO: 0 % (ref 0–1)
BILIRUB SERPL-MCNC: 0.32 MG/DL (ref 0.3–1.2)
BILIRUB UR QL STRIP: NEGATIVE
BUN SERPL-MCNC: 78 MG/DL (ref 6–20)
CALCIUM ALBUM COR SERPL-MCNC: 9.7 MG/DL (ref 8.3–10.1)
CALCIUM SERPL-MCNC: 9.2 MG/DL (ref 8.4–10.2)
CHLORIDE SERPL-SCNC: 103 MMOL/L (ref 96–108)
CLARITY UR: CLEAR
CO2 SERPL-SCNC: 23 MMOL/L (ref 22–33)
COLOR UR: YELLOW
CREAT SERPL-MCNC: 1.74 MG/DL (ref 0.4–1.1)
EOSINOPHIL # BLD AUTO: 0.11 THOUSAND/ΜL (ref 0–0.61)
EOSINOPHIL NFR BLD AUTO: 1 % (ref 0–6)
ERYTHROCYTE [DISTWIDTH] IN BLOOD BY AUTOMATED COUNT: 15 % (ref 11.6–15.1)
GFR SERPL CREATININE-BSD FRML MDRD: 31 ML/MIN/1.73SQ M
GLUCOSE SERPL-MCNC: 182 MG/DL (ref 65–140)
GLUCOSE SERPL-MCNC: 197 MG/DL (ref 65–140)
GLUCOSE UR STRIP-MCNC: NEGATIVE MG/DL
HCT VFR BLD AUTO: 28.6 % (ref 34.8–46.1)
HGB BLD-MCNC: 8.9 G/DL (ref 11.5–15.4)
HGB UR QL STRIP.AUTO: ABNORMAL
IMM GRANULOCYTES # BLD AUTO: 0.19 THOUSAND/UL (ref 0–0.2)
IMM GRANULOCYTES NFR BLD AUTO: 1 % (ref 0–2)
INR PPP: 4.1
INR PPP: 5.37 (ref 0.9–1.1)
KETONES UR STRIP-MCNC: NEGATIVE MG/DL
LACTATE SERPL-SCNC: 2 MMOL/L (ref 0–2)
LEUKOCYTE ESTERASE UR QL STRIP: ABNORMAL
LIPASE SERPL-CCNC: 20 U/L (ref 13–60)
LYMPHOCYTES # BLD AUTO: 1.82 THOUSANDS/ΜL (ref 0.6–4.47)
LYMPHOCYTES NFR BLD AUTO: 11 % (ref 14–44)
MCH RBC QN AUTO: 32.1 PG (ref 26.8–34.3)
MCHC RBC AUTO-ENTMCNC: 31.1 G/DL (ref 31.4–37.4)
MCV RBC AUTO: 103 FL (ref 82–98)
MONOCYTES # BLD AUTO: 1.22 THOUSAND/ΜL (ref 0.17–1.22)
MONOCYTES NFR BLD AUTO: 7 % (ref 4–12)
NEUTROPHILS # BLD AUTO: 13.14 THOUSANDS/ΜL (ref 1.85–7.62)
NEUTS SEG NFR BLD AUTO: 80 % (ref 43–75)
NITRITE UR QL STRIP: NEGATIVE
NON-SQ EPI CELLS URNS QL MICRO: ABNORMAL /HPF
PH UR STRIP.AUTO: 5.5 [PH]
PLATELET # BLD AUTO: 383 THOUSANDS/UL (ref 149–390)
PMV BLD AUTO: 10.1 FL (ref 8.9–12.7)
POTASSIUM SERPL-SCNC: 5.9 MMOL/L (ref 3.5–5)
PROT SERPL-MCNC: 7.2 G/DL (ref 6.4–8.3)
PROT UR STRIP-MCNC: NEGATIVE MG/DL
PROTHROMBIN TIME: 38.7 SEC (ref 9–11.5)
PROTHROMBIN TIME: 56 SECONDS (ref 9.5–12.1)
RBC # BLD AUTO: 2.77 MILLION/UL (ref 3.81–5.12)
RBC #/AREA URNS AUTO: ABNORMAL /HPF
SODIUM SERPL-SCNC: 137 MMOL/L (ref 133–145)
SP GR UR STRIP.AUTO: 1.01 (ref 1–1.03)
T4 FREE SERPL-MCNC: 1.18 NG/DL (ref 0.76–1.46)
TROPONIN I SERPL-MCNC: <0.03 NG/ML (ref 0–0.07)
TSH SERPL DL<=0.05 MIU/L-ACNC: 6.86 UIU/ML (ref 0.34–5.6)
UROBILINOGEN UR QL STRIP.AUTO: 0.2 E.U./DL
WBC # BLD AUTO: 16.52 THOUSAND/UL (ref 4.31–10.16)
WBC #/AREA URNS AUTO: ABNORMAL /HPF

## 2021-08-07 PROCEDURE — 36415 COLL VENOUS BLD VENIPUNCTURE: CPT | Performed by: PHYSICIAN ASSISTANT

## 2021-08-07 PROCEDURE — 94760 N-INVAS EAR/PLS OXIMETRY 1: CPT

## 2021-08-07 PROCEDURE — 81003 URINALYSIS AUTO W/O SCOPE: CPT | Performed by: PHYSICIAN ASSISTANT

## 2021-08-07 PROCEDURE — 93005 ELECTROCARDIOGRAM TRACING: CPT

## 2021-08-07 PROCEDURE — 83605 ASSAY OF LACTIC ACID: CPT | Performed by: PHYSICIAN ASSISTANT

## 2021-08-07 PROCEDURE — 96365 THER/PROPH/DIAG IV INF INIT: CPT

## 2021-08-07 PROCEDURE — 99220 PR INITIAL OBSERVATION CARE/DAY 70 MINUTES: CPT | Performed by: INTERNAL MEDICINE

## 2021-08-07 PROCEDURE — 83690 ASSAY OF LIPASE: CPT | Performed by: PHYSICIAN ASSISTANT

## 2021-08-07 PROCEDURE — 94640 AIRWAY INHALATION TREATMENT: CPT

## 2021-08-07 PROCEDURE — 99285 EMERGENCY DEPT VISIT HI MDM: CPT

## 2021-08-07 PROCEDURE — 82948 REAGENT STRIP/BLOOD GLUCOSE: CPT

## 2021-08-07 PROCEDURE — 85025 COMPLETE CBC W/AUTO DIFF WBC: CPT | Performed by: PHYSICIAN ASSISTANT

## 2021-08-07 PROCEDURE — 84443 ASSAY THYROID STIM HORMONE: CPT | Performed by: INTERNAL MEDICINE

## 2021-08-07 PROCEDURE — 84439 ASSAY OF FREE THYROXINE: CPT | Performed by: INTERNAL MEDICINE

## 2021-08-07 PROCEDURE — G1004 CDSM NDSC: HCPCS

## 2021-08-07 PROCEDURE — 85610 PROTHROMBIN TIME: CPT | Performed by: PHYSICIAN ASSISTANT

## 2021-08-07 PROCEDURE — 99285 EMERGENCY DEPT VISIT HI MDM: CPT | Performed by: PHYSICIAN ASSISTANT

## 2021-08-07 PROCEDURE — 80053 COMPREHEN METABOLIC PANEL: CPT | Performed by: PHYSICIAN ASSISTANT

## 2021-08-07 PROCEDURE — 74176 CT ABD & PELVIS W/O CONTRAST: CPT

## 2021-08-07 PROCEDURE — 85730 THROMBOPLASTIN TIME PARTIAL: CPT | Performed by: PHYSICIAN ASSISTANT

## 2021-08-07 PROCEDURE — 81001 URINALYSIS AUTO W/SCOPE: CPT | Performed by: PHYSICIAN ASSISTANT

## 2021-08-07 PROCEDURE — 84484 ASSAY OF TROPONIN QUANT: CPT | Performed by: PHYSICIAN ASSISTANT

## 2021-08-07 PROCEDURE — 96375 TX/PRO/DX INJ NEW DRUG ADDON: CPT

## 2021-08-07 PROCEDURE — 96361 HYDRATE IV INFUSION ADD-ON: CPT

## 2021-08-07 PROCEDURE — 71045 X-RAY EXAM CHEST 1 VIEW: CPT

## 2021-08-07 RX ORDER — LORAZEPAM 0.5 MG/1
0.5 TABLET ORAL EVERY 8 HOURS PRN
Status: DISCONTINUED | OUTPATIENT
Start: 2021-08-07 | End: 2021-08-12 | Stop reason: HOSPADM

## 2021-08-07 RX ORDER — DOCUSATE SODIUM 100 MG/1
100 CAPSULE, LIQUID FILLED ORAL 2 TIMES DAILY
Status: DISCONTINUED | OUTPATIENT
Start: 2021-08-07 | End: 2021-08-09

## 2021-08-07 RX ORDER — OMEGA-3S/DHA/EPA/FISH OIL/D3 300MG-1000
400 CAPSULE ORAL DAILY
Status: DISCONTINUED | OUTPATIENT
Start: 2021-08-08 | End: 2021-08-12 | Stop reason: HOSPADM

## 2021-08-07 RX ORDER — METOPROLOL TARTRATE 5 MG/5ML
2.5 INJECTION INTRAVENOUS EVERY 6 HOURS PRN
Status: DISCONTINUED | OUTPATIENT
Start: 2021-08-07 | End: 2021-08-12 | Stop reason: HOSPADM

## 2021-08-07 RX ORDER — ALBUTEROL SULFATE 90 UG/1
1 AEROSOL, METERED RESPIRATORY (INHALATION) EVERY 6 HOURS PRN
Status: DISCONTINUED | OUTPATIENT
Start: 2021-08-07 | End: 2021-08-12 | Stop reason: HOSPADM

## 2021-08-07 RX ORDER — SENNOSIDES 8.6 MG
1 TABLET ORAL DAILY
Status: DISCONTINUED | OUTPATIENT
Start: 2021-08-08 | End: 2021-08-09

## 2021-08-07 RX ORDER — FERROUS SULFATE 325(65) MG
325 TABLET ORAL
Status: DISCONTINUED | OUTPATIENT
Start: 2021-08-08 | End: 2021-08-12 | Stop reason: HOSPADM

## 2021-08-07 RX ORDER — SODIUM CHLORIDE, SODIUM LACTATE, POTASSIUM CHLORIDE, CALCIUM CHLORIDE 600; 310; 30; 20 MG/100ML; MG/100ML; MG/100ML; MG/100ML
75 INJECTION, SOLUTION INTRAVENOUS CONTINUOUS
Status: DISCONTINUED | OUTPATIENT
Start: 2021-08-07 | End: 2021-08-09

## 2021-08-07 RX ORDER — MORPHINE SULFATE 4 MG/ML
4 INJECTION, SOLUTION INTRAMUSCULAR; INTRAVENOUS ONCE
Status: COMPLETED | OUTPATIENT
Start: 2021-08-07 | End: 2021-08-07

## 2021-08-07 RX ORDER — LEVOTHYROXINE SODIUM 0.1 MG/1
100 TABLET ORAL
Status: DISCONTINUED | OUTPATIENT
Start: 2021-08-08 | End: 2021-08-12 | Stop reason: HOSPADM

## 2021-08-07 RX ORDER — METOPROLOL TARTRATE 5 MG/5ML
5 INJECTION INTRAVENOUS ONCE
Status: COMPLETED | OUTPATIENT
Start: 2021-08-07 | End: 2021-08-07

## 2021-08-07 RX ORDER — SODIUM POLYSTYRENE SULFONATE 4.1 MEQ/G
15 POWDER, FOR SUSPENSION ORAL; RECTAL ONCE
Status: COMPLETED | OUTPATIENT
Start: 2021-08-07 | End: 2021-08-07

## 2021-08-07 RX ORDER — DULOXETIN HYDROCHLORIDE 30 MG/1
30 CAPSULE, DELAYED RELEASE ORAL 2 TIMES DAILY
Status: DISCONTINUED | OUTPATIENT
Start: 2021-08-07 | End: 2021-08-12 | Stop reason: HOSPADM

## 2021-08-07 RX ORDER — CALCIUM GLUCONATE 20 MG/ML
1 INJECTION, SOLUTION INTRAVENOUS ONCE
Status: COMPLETED | OUTPATIENT
Start: 2021-08-07 | End: 2021-08-07

## 2021-08-07 RX ORDER — ATORVASTATIN CALCIUM 10 MG/1
10 TABLET, FILM COATED ORAL DAILY
Status: DISCONTINUED | OUTPATIENT
Start: 2021-08-08 | End: 2021-08-09

## 2021-08-07 RX ORDER — ACETAMINOPHEN 325 MG/1
650 TABLET ORAL EVERY 6 HOURS PRN
Status: DISCONTINUED | OUTPATIENT
Start: 2021-08-07 | End: 2021-08-12 | Stop reason: HOSPADM

## 2021-08-07 RX ORDER — ALBUTEROL SULFATE 2.5 MG/3ML
2.5 SOLUTION RESPIRATORY (INHALATION) EVERY 6 HOURS PRN
Status: DISCONTINUED | OUTPATIENT
Start: 2021-08-07 | End: 2021-08-07

## 2021-08-07 RX ORDER — PANTOPRAZOLE SODIUM 40 MG/1
40 TABLET, DELAYED RELEASE ORAL
Status: DISCONTINUED | OUTPATIENT
Start: 2021-08-08 | End: 2021-08-08

## 2021-08-07 RX ORDER — FAMOTIDINE 20 MG/1
20 TABLET, FILM COATED ORAL DAILY
Status: DISCONTINUED | OUTPATIENT
Start: 2021-08-08 | End: 2021-08-08

## 2021-08-07 RX ADMIN — PIPERACILLIN AND TAZOBACTAM 3.38 G: 3; .375 INJECTION, POWDER, FOR SOLUTION INTRAVENOUS at 15:40

## 2021-08-07 RX ADMIN — PIPERACILLIN AND TAZOBACTAM 3.38 G: 3; .375 INJECTION, POWDER, FOR SOLUTION INTRAVENOUS at 23:20

## 2021-08-07 RX ADMIN — ALBUTEROL SULFATE 10 MG: 2.5 SOLUTION RESPIRATORY (INHALATION) at 18:31

## 2021-08-07 RX ADMIN — METOROPROLOL TARTRATE 5 MG: 5 INJECTION, SOLUTION INTRAVENOUS at 15:33

## 2021-08-07 RX ADMIN — MORPHINE SULFATE 4 MG: 4 INJECTION INTRAVENOUS at 14:17

## 2021-08-07 RX ADMIN — CALCIUM GLUCONATE 1 G: 20 INJECTION, SOLUTION INTRAVENOUS at 21:05

## 2021-08-07 RX ADMIN — SODIUM CHLORIDE, SODIUM LACTATE, POTASSIUM CHLORIDE, AND CALCIUM CHLORIDE 75 ML/HR: .6; .31; .03; .02 INJECTION, SOLUTION INTRAVENOUS at 18:07

## 2021-08-07 RX ADMIN — METOPROLOL TARTRATE 25 MG: 25 TABLET, FILM COATED ORAL at 23:17

## 2021-08-07 RX ADMIN — INSULIN LISPRO 1 UNITS: 100 INJECTION, SOLUTION INTRAVENOUS; SUBCUTANEOUS at 23:17

## 2021-08-07 RX ADMIN — SODIUM CHLORIDE 1000 ML: 0.9 INJECTION, SOLUTION INTRAVENOUS at 14:16

## 2021-08-07 RX ADMIN — SODIUM POLYSTYRENE SULFONATE 15 G: 1 POWDER ORAL; RECTAL at 20:58

## 2021-08-07 RX ADMIN — DOCUSATE SODIUM 100 MG: 100 CAPSULE, LIQUID FILLED ORAL at 23:17

## 2021-08-07 NOTE — ASSESSMENT & PLAN NOTE
Lab Results   Component Value Date    HGBA1C 6 3 03/23/2021       No results for input(s): POCGLU in the last 72 hours      Blood Sugar Average: Last 72 hrs:   will give sliding scale coverage and monitor blood glucose

## 2021-08-07 NOTE — H&P
[address] 1960, 57 y o  female MRN: [id number]  Unit/Bed#: ED 03 Encounter: [id number]  Primary Care Provider: Flor Mcneal MD   Date and time admitted to hospital: 8/7/2021  1:18 PM    * Abdominal pain  Assessment & Plan  Unclear etiology-patient has diffuse abdominal pain CT of the abdomen without contrast shows no acute abnormality in the abdomen or pelvis  Chronic large ventral hernia containing several loops of small intestine and the portion of cecum without evidence of incarceration noted  Patient has UTI and will give IV antibiotic with Zosyn  Lipase within normal limit lactic acid is 2  Consider General surgery input if no improvement  Diabetes mellitus [facility])  Assessment & Plan  Lab Results   Component Value Date    HGBA1C 6 3 03/23/2021       No results for input(s): POCGLU in the last 72 hours  Blood Sugar Average: Last 72 hrs:   will give sliding scale coverage and monitor blood glucose    Hypothyroid  Assessment & Plan  Continue levothyroxine    COPD with asthma ([facility] [address] )  Assessment & Plan  Stable-will resume nebulizer p r n  Paroxysmal atrial fibrillation (HCC)  Assessment & Plan  AFib with RVR-heart rate is in 120s to 130s-patient received 5 mg of metoprolol IV, will give p o  Metoprolol 25 b i d  And monitor  Potassium is 5 9 and will correct electrolytes  Will place on cardiac monitor  INR supratherapeutic and will hold Coumadin  Consider to consult Cardiology continues to have uncontrolled heart rate  Anemia due to stage 3b chronic kidney disease [facility])  Assessment & Plan  Lab Results   Component Value Date    EGFR 31 08/07/2021    EGFR 32 05/13/2021    EGFR 25 10/02/2020    CREATININE 1 74 (H) 08/07/2021    CREATININE 1 71 (H) 05/13/2021    CREATININE 1 69 (H) 01/04/2021   Monitor hemoglobin-patient has no active GI bleed  INR is supratherapeutic and will continue to hold Coumadin      VTE Pharmacologic Prophylaxis: Moderate Risk (Score 3-4) - Pharmacological DVT Prophylaxis Ordered: warfarin (Coumadin)  Code Status: Level 1 - Full Code   Discussion with family: Updated  (wife) at bedside  Anticipated Length of Stay: Patient will be admitted on an inpatient basis with an anticipated length of stay of greater than 2 midnights secondary to Abdominal pain ,AFib with  rvr    Total Time for Visit, including Counseling / Coordination of Care: 45 minutes Greater than 50% of this total time spent on direct patient counseling and coordination of care  Chief Complaint:  Abdominal pain    History of Present Illness:  Lian Segura is a 57 y o  female with a PMH of atrial fibrillation on Coumadin, morbid obesity, who presents with abdominal pain for the past 3 days  Patient states the pain is epigastric in nature intensity of 10/10 also around the umbilical area which is diffuse pain which has been gradually getting worse and today unable to tolerate  Patient denies of any nausea or vomiting  Pain is on and off intermittent  nonradiating no specific aggravating or relieving factor  Bowel movement was this morning which was normal   Patient in the ED was noted to have AFib with RVR with heart rate of 140s to 150s and received 5 mg of IV Lopressor which improved to 1 20s  And also received IV fluid hydration and IV Zosyn and had a CT of the abdomen without contrast which shows no acute changes  Patient however has dry heaving  No history of fever or chills or cough or shortness of breath  Patient denies of chest pain or palpitations  Review of Systems:  Review of Systems   Constitutional: Negative  HENT: Negative  Eyes: Negative  Respiratory: Negative  Cardiovascular: Negative  Gastrointestinal: Positive for abdominal pain  Endocrine: Negative  Genitourinary: Negative  Musculoskeletal: Negative  Allergic/Immunologic: Negative  Neurological: Negative      Hematological: Negative  Psychiatric/Behavioral: Negative  Past Medical and Surgical History:   Past Medical History:   Diagnosis Date    Anemia     Arthritis     Cancer of kidney ([facility] [address] )     Chronic kidney disease     Diabetes mellitus ([facility] )     Disease of thyroid gland     HLD (hyperlipidemia)     Hypertension     Ovarian cancer ([facility] )     Pneumonia        Past Surgical History:   Procedure Laterality Date    CHOLECYSTECTOMY      GALLBLADDER SURGERY  05/01/2004    HYSTERECTOMY  06/01/2018    NEPHRECTOMY Right 08/02/2018       Meds/Allergies:  Prior to Admission medications    Medication Sig Start Date End Date Taking?  Authorizing Provider   albuterol (2 5 mg/3 mL) 0 083 % nebulizer solution INHALE CONTENTS OF 1 VIAL ( 3 MILLILITERS ) IN NEBULIZER BY MOUTH AND INTO THE LUNGS EVERY 6 HOURS IF NEEDED FOR WHEEZING OR SHORTNESS OF BREATH 2/8/21  Yes Linsey Mckeon MD   albuterol (PROVENTIL HFA,VENTOLIN HFA) 90 mcg/act inhaler inhale 2 puffs by mouth and INTO THE LUNGS every 4 hours 7/16/21  Yes Linsey Mckeon MD   atorvastatin (LIPITOR) 10 mg tablet Take 1 tablet (10 mg total) by mouth daily 6/24/21  Yes Linsey Mckeon MD   DULoxetine (CYMBALTA) 30 mg delayed release capsule Take 1 capsule (30 mg total) by mouth 2 (two) times a day 8/5/21  Yes Linsey Mckeon MD   famotidine (PEPCID) 20 mg tablet Take 1 tablet (20 mg total) by mouth daily 6/24/21  Yes Linsey Mckeon MD   ferrous sulfate 325 (65 Fe) mg tablet Take 325 mg by mouth daily with breakfast    Yes Historical Provider, MD   levothyroxine 100 mcg tablet Take 1 tablet (100 mcg total) by mouth daily in the early morning 8/5/21  Yes Linsey Mckeon MD   LORazepam (ATIVAN) 0 5 mg tablet Take 1 tablet (0 5 mg total) by mouth every 8 (eight) hours as needed for anxiety 5/7/21  Yes Yojana Dozier MD   metoprolol succinate (TOPROL-XL) 25 mg 24 hr tablet Take 1 tablet (25 mg total) by mouth daily 3/23/21  Yes Le Santa MD   pantoprazole (PROTONIX) 40 mg tablet take 1 tablet by mouth once daily 7/21/21  Yes Jose Yanez MD   vitamin B-12 (VITAMIN B-12) 500 mcg tablet Take 1 tablet (500 mcg total) by mouth daily 6/26/20  Yes Jose Yanez MD   warfarin (COUMADIN) 1 mg tablet Take 1 tablet (1 mg total) by mouth daily 8/2/21  Yes Jose Yanez MD   warfarin (COUMADIN) 3 mg tablet Take 1 tablet (3 mg total) by mouth daily 6/24/21 9/22/21 Yes Jose Yanez MD   cholecalciferol (VITAMIN D3) 400 units tablet Take 1 tablet (400 Units total) by mouth daily 3/23/21 7/16/21  Le Santa MD   ipratropium (ATROVENT) 0 02 % nebulizer solution Take 1 vial (0 5 mg total) by nebulization 4 (four) times a day  Patient not taking: Reported on 8/7/2021 1/26/21   Jose Yanez MD   levothyroxine 100 mcg tablet Take 1 tablet (100 mcg total) by mouth daily 3/23/21 6/24/21  Le Santa MD   LORazepam (ATIVAN) 0 5 mg tablet Take 1 tablet (0 5 mg total) by mouth every 8 (eight) hours as needed for anxiety  Patient not taking: Reported on 8/7/2021 6/24/21   Jose Yanez MD   triamcinolone (KENALOG) 0 5 % cream Apply topically 3 (three) times a day  Patient not taking: Reported on 8/7/2021 6/24/21   Jose Yanez MD     I have reviewed home medications with patient personally  Allergies:    Allergies   Allergen Reactions    Flu Virus Vaccine Other (See Comments)     "I got a line up my arm"       Social History:  Marital Status: Single   Occupation: retired  Patient Pre-hospital Living Situation: Home  Patient Pre-hospital Level of Mobility: walks  Patient Pre-hospital Diet Restrictions: low carb  Substance Use History:   Social History     Substance and Sexual Activity   Alcohol Use Never    Comment: n/a     Social History     Tobacco Use   Smoking Status Former Smoker    Packs/day: 3 00    Years: 30 00    Pack years: 90 00    Quit date: 10/22/2010    Years since quitting: 10 8   Smokeless Tobacco Former User    Quit date: 9/1/2011   Tobacco Comment    quit approx  9 years ago     Social History     Substance and Sexual Activity   Drug Use Yes    Types: Marijuana    Comment: smokes with girlfriend when anxious       Family History:  Family History   Problem Relation Age of Onset    No Known Problems Mother     No Known Problems Father        Physical Exam:     Vitals:   Blood Pressure: 151/86 (08/07/21 1543)  Pulse: (!) 121 (08/07/21 1543)  Temperature: 97 7 °F (36 5 °C) (08/07/21 1512)  Temp Source: Oral (08/07/21 1512)  Respirations: 17 (08/07/21 1543)  SpO2: 100 % (08/07/21 1543)    Physical Exam   HEENT-PERRLA, moist oral mucosa  Neck-supple, no JVD elevation   Respiratory-equal air entry bilaterally, no rales or rhonchi  Cardiovascular system-S1, S2 heard, no murmur or gallops or rubs  Abdomen-soft, generalized tenderness mainly in the epigastric and periumbilical region morbidly obese, no guarding or rigidity, bowel sounds heard  Extremities-no pedal edema  Peripheral pulses palpable  Musculoskeletal-no contractures  Central nervous system-no acute focal neurological deficit ,no sensory or motor deficit noted    Skin-no rash noted          Additional Data:     Lab Results:  Results from last 7 days   Lab Units 08/07/21  1355   WBC Thousand/uL 16 52*   HEMOGLOBIN g/dL 8 9*   HEMATOCRIT % 28 6*   PLATELETS Thousands/uL 383   NEUTROS PCT % 80*   LYMPHS PCT % 11*   MONOS PCT % 7   EOS PCT % 1     Results from last 7 days   Lab Units 08/07/21  1355   SODIUM mmol/L 137   POTASSIUM mmol/L 5 9*   CHLORIDE mmol/L 103   CO2 mmol/L 23   BUN mg/dL 78*   CREATININE mg/dL 1 74*   ANION GAP mmol/L 11   CALCIUM mg/dL 9 2   ALBUMIN g/dL 3 4   TOTAL BILIRUBIN mg/dL 0 32   ALK PHOS U/L 95 8   ALT U/L 9   AST U/L 12*   GLUCOSE RANDOM mg/dL 197*     Results from last 7 days   Lab Units 08/07/21  1355   INR  5 37*             Results from last 7 days   Lab Units 08/07/21  1355   LACTIC ACID mmol/L 2 0       Imaging: Reviewed radiology reports from this admission including: abdominal/pelvic CT  CT abdomen pelvis wo contrast   Final Result by Franklin Avila MD (08/07 1514)      No evidence of acute abnormality in the abdomen or pelvis  Chronic large ventral hernia containing several loops of small intestine in a portion of the cecum, without evidence of incarceration  Workstation performed: [id number]         XR chest 1 view portable   ED Interpretation by Genaro Fernando MD (08/07 1512)   Poor inspiratory effort, no effusion no infiltrate  EKG and Other Studies Reviewed on Admission:   · EKG: Atrial fibrillation  HR 130s to 140s  ** Please Note: This note has been constructed using a voice recognition system   **

## 2021-08-07 NOTE — ED PROVIDER NOTES
History  Chief Complaint   Patient presents with    Abdominal Pain     Patient arrives via EMS with c/o abdominal pain x two days, positive for vomiting  Pt with Past Medical History: Arthritis, Cancer of kidney, Diabetes mellitus, Hypothyroid, hyperlipidemia, HTN, Ovarian cancer, Paroxysmal atrial fibrillation, COPD with asthma, Anemia due to stage 3b chronic kidney disease, Persistent proteinuria, Hyperparathyroidism, cellulitis, GERD, Anxiety,   Past Surgical History: CHOLECYSTECTOMY, HYSTERECTOMY, prior ectopic surgery, Right NEPHRECTOMY   Presents to ED via EMS with c/o 2 day h/o constant, worsening, diffuse abdominal pain + nausea, + dry heaves, + constipation, states has been drinking water all day, no fever, no cp, + sob, no urinary complaints, no abnormal vaginal bleeding or discharge          Prior to Admission Medications   Prescriptions Last Dose Informant Patient Reported? Taking?    DULoxetine (CYMBALTA) 30 mg delayed release capsule 8/7/2021 at Unknown time  No Yes   Sig: Take 1 capsule (30 mg total) by mouth 2 (two) times a day   LORazepam (ATIVAN) 0 5 mg tablet 8/7/2021 at Unknown time  No Yes   Sig: Take 1 tablet (0 5 mg total) by mouth every 8 (eight) hours as needed for anxiety   LORazepam (ATIVAN) 0 5 mg tablet Not Taking at Unknown time  No No   Sig: Take 1 tablet (0 5 mg total) by mouth every 8 (eight) hours as needed for anxiety   Patient not taking: Reported on 8/7/2021   albuterol (2 5 mg/3 mL) 0 083 % nebulizer solution Past Month at Unknown time  No Yes   Sig: INHALE CONTENTS OF 1 VIAL ( 3 MILLILITERS ) IN NEBULIZER BY MOUTH AND INTO THE LUNGS EVERY 6 HOURS IF NEEDED FOR WHEEZING OR SHORTNESS OF BREATH   albuterol (PROVENTIL HFA,VENTOLIN HFA) 90 mcg/act inhaler Past Week at Unknown time  No Yes   Sig: inhale 2 puffs by mouth and INTO THE LUNGS every 4 hours   atorvastatin (LIPITOR) 10 mg tablet 8/7/2021 at Unknown time  No Yes   Sig: Take 1 tablet (10 mg total) by mouth daily cholecalciferol (VITAMIN D3) 400 units tablet   No No   Sig: Take 1 tablet (400 Units total) by mouth daily   famotidine (PEPCID) 20 mg tablet 8/7/2021 at Unknown time  No Yes   Sig: Take 1 tablet (20 mg total) by mouth daily   ferrous sulfate 325 (65 Fe) mg tablet 8/7/2021 at Unknown time  Yes Yes   Sig: Take 325 mg by mouth daily with breakfast    ipratropium (ATROVENT) 0 02 % nebulizer solution Not Taking at Unknown time  No No   Sig: Take 1 vial (0 5 mg total) by nebulization 4 (four) times a day   Patient not taking: Reported on 8/7/2021   levothyroxine 100 mcg tablet   No No   Sig: Take 1 tablet (100 mcg total) by mouth daily   levothyroxine 100 mcg tablet 8/7/2021 at Unknown time  No Yes   Sig: Take 1 tablet (100 mcg total) by mouth daily in the early morning   metoprolol succinate (TOPROL-XL) 25 mg 24 hr tablet 8/7/2021 at Unknown time  No Yes   Sig: Take 1 tablet (25 mg total) by mouth daily   pantoprazole (PROTONIX) 40 mg tablet 8/7/2021 at Unknown time  No Yes   Sig: take 1 tablet by mouth once daily   triamcinolone (KENALOG) 0 5 % cream Not Taking at Unknown time  No No   Sig: Apply topically 3 (three) times a day   Patient not taking: Reported on 8/7/2021   vitamin B-12 (VITAMIN B-12) 500 mcg tablet 8/7/2021 at Unknown time Self No Yes   Sig: Take 1 tablet (500 mcg total) by mouth daily   warfarin (COUMADIN) 1 mg tablet 8/7/2021 at Unknown time  No Yes   Sig: Take 1 tablet (1 mg total) by mouth daily   warfarin (COUMADIN) 3 mg tablet 8/7/2021 at Unknown time  No Yes   Sig: Take 1 tablet (3 mg total) by mouth daily      Facility-Administered Medications: None       Past Medical History:   Diagnosis Date    Anemia     Arthritis     [facility]     Chronic kidney disease     Diabetes mellitus ([facility] )     Disease of thyroid gland     HLD (hyperlipidemia)     Hypertension     Ovarian cancer ([facility] )     Pneumonia        Past Surgical History:   Procedure Laterality Date    CHOLECYSTECTOMY  GALLBLADDER SURGERY  05/01/2004    HYSTERECTOMY  06/01/2018    NEPHRECTOMY Right 08/02/2018       Family History   Problem Relation Age of Onset    No Known Problems Mother     No Known Problems Father      I have reviewed and agree with the history as documented  E-Cigarette/Vaping    E-Cigarette Use Never User      E-Cigarette/Vaping Substances     Social History     Tobacco Use    Smoking status: Former Smoker     Packs/day: 3 00     Years: 30 00     Pack years: 90 00     Quit date: 10/22/2010     Years since quitting: 10 8    Smokeless tobacco: Former User     Quit date: 9/1/2011    Tobacco comment: quit approx  9 years ago   Vaping Use    Vaping Use: Never used   Substance Use Topics    Alcohol use: Never     Comment: n/a    Drug use: Yes     Types: Marijuana     Comment: smokes with girlfriend when anxious       Review of Systems   Constitutional: Negative for chills and fever  HENT: Negative for congestion, hearing loss, nosebleeds, sore throat and trouble swallowing  Eyes: Negative for visual disturbance  Respiratory: Negative for cough and shortness of breath  Cardiovascular: Negative for chest pain and leg swelling  Gastrointestinal: Positive for abdominal pain, constipation, nausea and vomiting  Negative for diarrhea  Genitourinary: Negative for dysuria, frequency, hematuria, vaginal bleeding and vaginal discharge  Musculoskeletal: Negative for arthralgias, gait problem and myalgias  Skin: Negative for color change and pallor  Neurological: Positive for weakness  Negative for dizziness and headaches  Psychiatric/Behavioral: Negative for behavioral problems  All other systems reviewed and are negative  Physical Exam  Physical Exam  Vitals and nursing note reviewed  Constitutional:       General: She is in acute distress  Appearance: She is obese  She is not ill-appearing  HENT:      Head: Normocephalic and atraumatic        Right Ear: External ear normal       Left Ear: External ear normal       Nose: Nose normal       Mouth/Throat:      Mouth: Mucous membranes are moist       Pharynx: Oropharynx is clear  Eyes:      Conjunctiva/sclera: Conjunctivae normal    Cardiovascular:      Rate and Rhythm: Tachycardia present  Rhythm irregular  Pulmonary:      Effort: Pulmonary effort is normal  No respiratory distress  Breath sounds: Normal breath sounds  Abdominal:      General: Abdomen is protuberant  Bowel sounds are normal       Palpations: Abdomen is soft  Tenderness: There is generalized abdominal tenderness (difficult to localize pain due to body habitus, no guarding)  Hernia: A hernia is present  Genitourinary:     Comments: deferred  Musculoskeletal:         General: Normal range of motion  Cervical back: Normal range of motion  Skin:     General: Skin is warm and dry  Capillary Refill: Capillary refill takes less than 2 seconds  Neurological:      General: No focal deficit present  Mental Status: She is alert and oriented to person, place, and time  Psychiatric:         Mood and Affect: Mood is anxious           Behavior: Behavior normal          Vital Signs  ED Triage Vitals   Temperature Pulse Respirations Blood Pressure SpO2   08/07/21 1512 08/07/21 1326 08/07/21 1326 08/07/21 1326 08/07/21 1326   97 7 °F (36 5 °C) (!) 120 18 (!) 125/103 96 %      Temp Source Heart Rate Source Patient Position - Orthostatic VS BP Location FiO2 (%)   08/07/21 1512 08/07/21 1326 08/07/21 1326 08/07/21 1326 --   Oral Monitor Lying Right arm       Pain Score       --                  Vitals:    08/07/21 1326 08/07/21 1543 08/07/21 1806   BP: (!) 125/103 151/86 109/72   Pulse: (!) 120 (!) 121 (!) 124   Patient Position - Orthostatic VS: Lying  Lying         Visual Acuity      ED Medications  Medications   lactated ringers infusion (75 mL/hr Intravenous New Bag 8/7/21 1807)   acetaminophen (TYLENOL) tablet 650 mg (has no administration in time range)   docusate sodium (COLACE) capsule 100 mg (has no administration in time range)   senna (SENOKOT) tablet 8 6 mg (has no administration in time range)   albuterol (PROVENTIL HFA,VENTOLIN HFA) inhaler 1 puff (has no administration in time range)   atorvastatin (LIPITOR) tablet 10 mg (has no administration in time range)   cholecalciferol (VITAMIN D3) tablet 400 Units (has no administration in time range)   DULoxetine (CYMBALTA) delayed release capsule 30 mg (has no administration in time range)   famotidine (PEPCID) tablet 20 mg (has no administration in time range)   ferrous sulfate tablet 325 mg (has no administration in time range)   levothyroxine tablet 100 mcg (has no administration in time range)   LORazepam (ATIVAN) tablet 0 5 mg (has no administration in time range)   pantoprazole (PROTONIX) EC tablet 40 mg (has no administration in time range)   cyanocobalamin (VITAMIN B-12) tablet 500 mcg (has no administration in time range)   piperacillin-tazobactam (ZOSYN) 3 375 g in sodium chloride 0 9 % 100 mL IVPB (has no administration in time range)   metoprolol (LOPRESSOR) injection 2 5 mg (has no administration in time range)   metoprolol tartrate (LOPRESSOR) tablet 25 mg (has no administration in time range)   sodium polystyrene (KAYEXALATE) powder 15 g (has no administration in time range)   albuterol inhalation solution 10 mg (10 mg Nebulization Given 8/7/21 1831)   calcium gluconate 1 g in sodium chloride 0 9% 50 mL (premix) (has no administration in time range)   insulin lispro (HumaLOG) 100 units/mL subcutaneous injection 1-5 Units (has no administration in time range)   insulin lispro (HumaLOG) 100 units/mL subcutaneous injection 1-5 Units (has no administration in time range)   morphine injection 2 mg (has no administration in time range)   sodium chloride 0 9 % bolus 1,000 mL (0 mL Intravenous Stopped 8/7/21 1806)   morphine (PF) 4 mg/mL injection 4 mg (4 mg Intravenous Given 8/7/21 1417) metoprolol (LOPRESSOR) injection 5 mg (5 mg Intravenous Given 8/7/21 1533)   piperacillin-tazobactam (ZOSYN) 3 375 g in sodium chloride 0 9 % 100 mL IVPB (0 g Intravenous Stopped 8/7/21 1654)       Diagnostic Studies  Results Reviewed     Procedure Component Value Units Date/Time    TSH, 3rd generation with Free T4 reflex [[id number]]  (Abnormal) Collected: 08/07/21 1355    Lab Status: Final result Specimen: Blood from Arm, Left Updated: 08/07/21 1826     TSH 3RD GENERATON 6 858 uIU/mL     Narrative:      Patients undergoing fluorescein dye angiography may retain small amounts of fluorescein in the body for 48-72 hours post procedure  Samples containing fluorescein can produce falsely depressed TSH values  If the patient had this procedure,a specimen should be resubmitted post fluorescein clearance  T4, free [id number] Collected: 08/07/21 1355    Lab Status:  In process Specimen: Blood from Arm, Left Updated: 08/07/21 1823    Urine Microscopic [[id number]]  (Abnormal) Collected: 08/07/21 1527    Lab Status: Final result Specimen: Urine, Clean Catch Updated: 08/07/21 1602     RBC, UA 1-2 /hpf      WBC, UA 4-10 /hpf      Epithelial Cells Moderate /hpf      Bacteria, UA Occasional /hpf     UA w Reflex to Microscopic w Reflex to Culture [[id number]]  (Abnormal) Collected: 08/07/21 1527    Lab Status: Final result Specimen: Urine, Clean Catch Updated: 08/07/21 1533     Color, UA Yellow     Clarity, UA Clear     Specific Gravity, UA 1 010     pH, UA 5 5     Leukocytes, UA 1+     Nitrite, UA Negative     Protein, UA Negative mg/dl      Glucose, UA Negative mg/dl      Ketones, UA Negative mg/dl      Urobilinogen, UA 0 2 E U /dl      Bilirubin, UA Negative     Blood, UA 2+    Protime-INR [[id number]]  (Abnormal) Collected: 08/07/21 1355    Lab Status: Final result Specimen: Blood from Arm, Left Updated: 08/07/21 1432     Protime 56 0 seconds      INR 5 37    Narrative:      INR Reference Ranges:  No Anticoagulant, Normal: 0 9-1 1  Standard Dose, Oral Anticoagulant:  2 0-3 0  High Dose, Oral Anticoagulant:      2 5-3 5    APTT [[id number]]  (Abnormal) Collected: 08/07/21 1355    Lab Status: Final result Specimen: Blood from Arm, Left Updated: 08/07/21 1432     PTT 49 seconds     Comprehensive metabolic panel [[id number]]  (Abnormal) Collected: 08/07/21 1355    Lab Status: Final result Specimen: Blood from Arm, Left Updated: 08/07/21 1427     Sodium 137 mmol/L      Potassium 5 9 mmol/L      Chloride 103 mmol/L      CO2 23 mmol/L      ANION GAP 11 mmol/L      BUN 78 mg/dL      Creatinine 1 74 mg/dL      Glucose 197 mg/dL      Calcium 9 2 mg/dL      Corrected Calcium 9 7 mg/dL      AST 12 U/L      ALT 9 U/L      Alkaline Phosphatase 95 8 U/L      Total Protein 7 2 g/dL      Albumin 3 4 g/dL      Total Bilirubin 0 32 mg/dL      eGFR 31 ml/min/1 73sq m     Narrative:      [place] guidelines for Chronic Kidney Disease (CKD):     Stage 1 with normal or high GFR (GFR > 90 mL/min/1 73 square meters)    Stage 2 Mild CKD (GFR = 60-89 mL/min/1 73 square meters)    Stage 3A Moderate CKD (GFR = 45-59 mL/min/1 73 square meters)    Stage 3B Moderate CKD (GFR = 30-44 mL/min/1 73 square meters)    Stage 4 Severe CKD (GFR = 15-29 mL/min/1 73 square meters)    Stage 5 End Stage CKD (GFR <15 mL/min/1 73 square meters)  Note: GFR calculation is accurate only with a steady state creatinine    Lipase [[id number]]  (Normal) Collected: 08/07/21 1355    Lab Status: Final result Specimen: Blood from Arm, Left Updated: 08/07/21 1427     Lipase 20 u/L     Lactic acid [[id number]]  (Normal) Collected: 08/07/21 1355    Lab Status: Final result Specimen: Blood from Arm, Left Updated: 08/07/21 1425     LACTIC ACID 2 0 mmol/L     Narrative:      Result may be elevated if tourniquet was used during collection      Troponin I [[id number]]  (Normal) Collected: 08/07/21 1355    Lab Status: Final result Specimen: Blood from Arm, Left Updated: 08/07/21 1425     Troponin I <0 03 ng/mL     CBC and differential [[id number]]  (Abnormal) Collected: 08/07/21 1355    Lab Status: Final result Specimen: Blood from Arm, Left Updated: 08/07/21 1408     WBC 16 52 Thousand/uL      RBC 2 77 Million/uL      Hemoglobin 8 9 g/dL      Hematocrit 28 6 %       fL      MCH 32 1 pg      MCHC 31 1 g/dL      RDW 15 0 %      MPV 10 1 fL      Platelets 020 Thousands/uL      Neutrophils Relative 80 %      Immat GRANS % 1 %      Lymphocytes Relative 11 %      Monocytes Relative 7 %      Eosinophils Relative 1 %      Basophils Relative 0 %      Neutrophils Absolute 13 14 Thousands/µL      Immature Grans Absolute 0 19 Thousand/uL      Lymphocytes Absolute 1 82 Thousands/µL      Monocytes Absolute 1 22 Thousand/µL      Eosinophils Absolute 0 11 Thousand/µL      Basophils Absolute 0 04 Thousands/µL                  CT abdomen pelvis wo contrast   Final Result by Abundio Mckeon MD (08/07 1514)      No evidence of acute abnormality in the abdomen or pelvis  Chronic large ventral hernia containing several loops of small intestine in a portion of the cecum, without evidence of incarceration  Workstation performed: [id number]         XR chest 1 view portable   ED Interpretation by Shelia Rubin MD (08/07 1512)   Poor inspiratory effort, no effusion no infiltrate                    Procedures  ECG 12 Lead Documentation Only    Date/Time: 8/7/2021 1:38 PM  Performed by: Aga Thomas PA-C  Authorized by: Aga Thomas PA-C     Indications / Diagnosis:  Epigastic pain  ECG reviewed by me, the ED Provider: yes    Patient location:  ED  Previous ECG:     Comparison to cardiac monitor: Yes    Interpretation:     Interpretation: abnormal    Rate:     ECG rate:  136    ECG rate assessment: tachycardic    Rhythm:     Rhythm: atrial fibrillation    Comments:      No acute ischemic changes             ED Course  ED Course as of Aug 07 1835   Sat Aug 07, 2021 1503 Cr at baseline, BUN elevated, pt with chronic anemia, but decreased since last 12 9 2 months ago      1521 IMPRESSION:   No evidence of acute abnormality in the abdomen or pelvis    Chronic large ventral hernia containing several loops of small intestine in a portion of the cecum, without evidence of incarceration  SBIRT 20yo+      Most Recent Value   SBIRT (22 yo +)   In order to provide better care to our patients, we are screening all of our patients for alcohol and drug use  Would it be okay to ask you these screening questions? Yes Filed at: 08/07/2021 1324   Initial Alcohol Screen: US AUDIT-C    1  How often do you have a drink containing alcohol?  0 Filed at: 08/07/2021 1324   2  How many drinks containing alcohol do you have on a typical day you are drinking? 0 Filed at: 08/07/2021 1324   3a  Male UNDER 65: How often do you have five or more drinks on one occasion? 0 Filed at: 08/07/2021 1324   3b  FEMALE Any Age, or MALE 65+: How often do you have 4 or more drinks on one occassion? 0 Filed at: 08/07/2021 1324   Audit-C Score  0 Filed at: 08/07/2021 1324   RADHA: How many times in the past year have you    Used an illegal drug or used a prescription medication for non-medical reasons?   Never Filed at: 08/07/2021 1324                    MDM  Number of Diagnoses or Management Options  Diagnosis management comments: Patient's heart rate improving, patient with abdominal pain, leukocytosis, acute element of chronic anemia hemoglobin 12 , 2 months ago,  now 9,  no signs of active bleeding, no focal infectious causes,  chronic AFib with uncontrolled rate,  will admit to hospital   Cr 1 7, baseline for patient       Amount and/or Complexity of Data Reviewed  Clinical lab tests: ordered and reviewed  Tests in the radiology section of CPT®: ordered and reviewed  Independent visualization of images, tracings, or specimens: yes        Disposition  Final diagnoses: Generalized abdominal pain   Leukocytosis, unspecified type   Anemia   Chronic a-fib ([address] )     Time reflects when diagnosis was documented in both MDM as applicable and the Disposition within this note     Time User Action Codes Description Comment    8/7/2021  5:20 PM Jhoana Cardwell Add [R10 [id number]] Generalized abdominal pain     8/7/2021  5:20 PM Gloria Sickle [D72 [id number]] Leukocytosis, unspecified type     8/7/2021  5:20 PM Jhoana Nick Add [D64 9] Anemia     8/7/2021  5:20 PM Jhoana Nick Add [I48 [id number]] Chronic a-fib [facility])       ED Disposition     ED Disposition Condition Date/Time Comment    Admit Stable Sat Aug 7, 2021  5:20 PM Case was discussed with Jimy Crane and the patient's admission status was agreed to be Admission Status: observation status to the service of Dr Jimy Crane           Follow-up Information    None         Current Discharge Medication List      CONTINUE these medications which have NOT CHANGED    Details   albuterol (2 5 mg/3 mL) 0 083 % nebulizer solution INHALE CONTENTS OF 1 VIAL ( 3 MILLILITERS ) IN NEBULIZER BY MOUTH AND INTO THE LUNGS EVERY 6 HOURS IF NEEDED FOR WHEEZING OR SHORTNESS OF BREATH  Qty: 75 mL, Refills: 5    Associated Diagnoses: Chronic obstructive pulmonary disease, unspecified COPD type ([facility])      albuterol (PROVENTIL HFA,VENTOLIN HFA) 90 mcg/act inhaler inhale 2 puffs by mouth and INTO THE LUNGS every 4 hours  Qty: 8 5 g, Refills: 2    Associated Diagnoses: COPD with asthma ([facility])      atorvastatin (LIPITOR) 10 mg tablet Take 1 tablet (10 mg total) by mouth daily  Qty: 90 tablet, Refills: 1    Associated Diagnoses: Dyslipidemia      DULoxetine (CYMBALTA) 30 mg delayed release capsule Take 1 capsule (30 mg total) by mouth 2 (two) times a day  Qty: 180 capsule, Refills: 1    Associated Diagnoses: Other diabetic neurological complication associated with type 2 diabetes mellitus (HCC)      famotidine (PEPCID) 20 mg tablet Take 1 tablet (20 mg total) by mouth daily  Qty: 90 tablet, Refills: 1    Associated Diagnoses: Gastroesophageal reflux disease without esophagitis      ferrous sulfate 325 (65 Fe) mg tablet Take 325 mg by mouth daily with breakfast       levothyroxine 100 mcg tablet Take 1 tablet (100 mcg total) by mouth daily in the early morning  Qty: 90 tablet, Refills: 3    Associated Diagnoses: Other specified hypothyroidism      !! LORazepam (ATIVAN) 0 5 mg tablet Take 1 tablet (0 5 mg total) by mouth every 8 (eight) hours as needed for anxiety  Qty: 30 tablet, Refills: 1    Associated Diagnoses: Panic attack      metoprolol succinate (TOPROL-XL) 25 mg 24 hr tablet Take 1 tablet (25 mg total) by mouth daily  Qty: 90 tablet, Refills: 3    Associated Diagnoses: Essential hypertension      pantoprazole (PROTONIX) 40 mg tablet take 1 tablet by mouth once daily  Qty: 90 tablet, Refills: 1    Associated Diagnoses: Gastritis      vitamin B-12 (VITAMIN B-12) 500 mcg tablet Take 1 tablet (500 mcg total) by mouth daily  Qty: 90 tablet, Refills: 3    Associated Diagnoses: CKD (chronic kidney disease) stage 3, GFR 30-59 ml/min ([facility])      ! ! warfarin (COUMADIN) 1 mg tablet Take 1 tablet (1 mg total) by mouth daily  Qty: 90 tablet, Refills: 1    Associated Diagnoses: Longstanding persistent atrial fibrillation ([facility] )      ! ! warfarin (COUMADIN) 3 mg tablet Take 1 tablet (3 mg total) by mouth daily  Qty: 90 tablet, Refills: 0    Associated Diagnoses: Atrial fibrillation, unspecified type ([facility])      cholecalciferol (VITAMIN D3) 400 units tablet Take 1 tablet (400 Units total) by mouth daily  Qty: 90 tablet, Refills: 1    Associated Diagnoses: Stage 3b chronic kidney disease ([facility])      ipratropium (ATROVENT) 0 02 % nebulizer solution Take 1 vial (0 5 mg total) by nebulization 4 (four) times a day  Qty: 75 mL, Refills: 5    Associated Diagnoses: Chronic obstructive pulmonary disease, unspecified COPD type ([facility] )      ! !  LORazepam (ATIVAN) 0 5 mg tablet Take 1 tablet (0 5 mg total) by mouth every 8 (eight) hours as needed for anxiety  Qty: 90 tablet, Refills: 1    Associated Diagnoses: Anxiety      triamcinolone (KENALOG) 0 5 % cream Apply topically 3 (three) times a day  Qty: 45 g, Refills: 1    Associated Diagnoses: Eczema of both hands       !! - Potential duplicate medications found  Please discuss with provider  No discharge procedures on file      PDMP Review       Value Time User    PDMP Reviewed  Yes 5/7/2021  1:02 PM Jovani Matos MD          ED Provider  Electronically Signed by           Carmen Arias PA-C  08/07/21 [id number]

## 2021-08-07 NOTE — ASSESSMENT & PLAN NOTE
AFib with RVR-heart rate is in 120s to 130s-patient received 5 mg of metoprolol IV, will give p o  Metoprolol 25 b i d  And monitor  Potassium is 5 9 and will correct electrolytes  Will place on cardiac monitor  INR supratherapeutic and will hold Coumadin  Consider to consult Cardiology continues to have uncontrolled heart rate

## 2021-08-07 NOTE — ASSESSMENT & PLAN NOTE
Unclear etiology-patient has diffuse abdominal pain CT of the abdomen without contrast shows no acute abnormality in the abdomen or pelvis  Chronic large ventral hernia containing several loops of small intestine and the portion of cecum without evidence of incarceration noted  Patient has UTI and will give IV antibiotic with Zosyn  Lipase within normal limit lactic acid is 2  Consider General surgery input if no improvement

## 2021-08-07 NOTE — ASSESSMENT & PLAN NOTE
Lab Results   Component Value Date    EGFR 31 08/07/2021    EGFR 32 05/13/2021    EGFR 25 10/02/2020    CREATININE 1 74 (H) 08/07/2021    CREATININE 1 71 (H) 05/13/2021    CREATININE 1 69 (H) 01/04/2021   Monitor hemoglobin-patient has no active GI bleed  INR is supratherapeutic and will continue to hold Coumadin

## 2021-08-08 PROBLEM — R79.1 SUPRATHERAPEUTIC INR: Status: ACTIVE | Noted: 2021-08-08

## 2021-08-08 LAB
ABO GROUP BLD: NORMAL
ABO GROUP BLD: NORMAL
ANION GAP SERPL CALCULATED.3IONS-SCNC: 6 MMOL/L (ref 4–13)
BLD GP AB SCN SERPL QL: NEGATIVE
BUN SERPL-MCNC: 79 MG/DL (ref 6–20)
CALCIUM SERPL-MCNC: 8.6 MG/DL (ref 8.4–10.2)
CHLORIDE SERPL-SCNC: 105 MMOL/L (ref 96–108)
CO2 SERPL-SCNC: 29 MMOL/L (ref 22–33)
CREAT SERPL-MCNC: 1.88 MG/DL (ref 0.4–1.1)
ERYTHROCYTE [DISTWIDTH] IN BLOOD BY AUTOMATED COUNT: 15.3 % (ref 11.6–15.1)
FERRITIN SERPL-MCNC: 66 NG/ML (ref 8–388)
GFR SERPL CREATININE-BSD FRML MDRD: 28 ML/MIN/1.73SQ M
GLUCOSE P FAST SERPL-MCNC: 124 MG/DL (ref 70–105)
GLUCOSE SERPL-MCNC: 124 MG/DL (ref 65–140)
GLUCOSE SERPL-MCNC: 143 MG/DL (ref 65–140)
GLUCOSE SERPL-MCNC: 146 MG/DL (ref 65–140)
GLUCOSE SERPL-MCNC: 167 MG/DL (ref 65–140)
HCT VFR BLD AUTO: 17.5 % (ref 34.8–46.1)
HCT VFR BLD AUTO: 21.1 % (ref 34.8–46.1)
HGB BLD-MCNC: 5.3 G/DL (ref 11.5–15.4)
HGB BLD-MCNC: 6.4 G/DL (ref 11.5–15.4)
INR PPP: 5.17 (ref 0.9–1.1)
INR PPP: 5.8 (ref 0.9–1.1)
IRON SATN MFR SERPL: 45 %
IRON SERPL-MCNC: 119 UG/DL (ref 50–170)
LACTATE SERPL-SCNC: 1.3 MMOL/L (ref 0–2)
MCH RBC QN AUTO: 31.5 PG (ref 26.8–34.3)
MCHC RBC AUTO-ENTMCNC: 30.3 G/DL (ref 31.4–37.4)
MCV RBC AUTO: 104 FL (ref 82–98)
PLATELET # BLD AUTO: 308 THOUSANDS/UL (ref 149–390)
PMV BLD AUTO: 9.8 FL (ref 8.9–12.7)
POTASSIUM SERPL-SCNC: 5.3 MMOL/L (ref 3.5–5)
PROTHROMBIN TIME: 54 SECONDS (ref 9.5–12.1)
PROTHROMBIN TIME: 60.3 SECONDS (ref 9.5–12.1)
RBC # BLD AUTO: 2.03 MILLION/UL (ref 3.81–5.12)
RH BLD: POSITIVE
RH BLD: POSITIVE
SODIUM SERPL-SCNC: 140 MMOL/L (ref 133–145)
SPECIMEN EXPIRATION DATE: NORMAL
TIBC SERPL-MCNC: 262 UG/DL (ref 250–450)
VIT B12 SERPL-MCNC: 314 PG/ML (ref 100–900)
WBC # BLD AUTO: 13.07 THOUSAND/UL (ref 4.31–10.16)

## 2021-08-08 PROCEDURE — 83605 ASSAY OF LACTIC ACID: CPT | Performed by: INTERNAL MEDICINE

## 2021-08-08 PROCEDURE — 30233K1 TRANSFUSION OF NONAUTOLOGOUS FROZEN PLASMA INTO PERIPHERAL VEIN, PERCUTANEOUS APPROACH: ICD-10-PCS | Performed by: INTERNAL MEDICINE

## 2021-08-08 PROCEDURE — 99223 1ST HOSP IP/OBS HIGH 75: CPT | Performed by: INTERNAL MEDICINE

## 2021-08-08 PROCEDURE — 83550 IRON BINDING TEST: CPT | Performed by: INTERNAL MEDICINE

## 2021-08-08 PROCEDURE — 86901 BLOOD TYPING SEROLOGIC RH(D): CPT | Performed by: INTERNAL MEDICINE

## 2021-08-08 PROCEDURE — 85018 HEMOGLOBIN: CPT | Performed by: INTERNAL MEDICINE

## 2021-08-08 PROCEDURE — 80048 BASIC METABOLIC PNL TOTAL CA: CPT | Performed by: INTERNAL MEDICINE

## 2021-08-08 PROCEDURE — 82948 REAGENT STRIP/BLOOD GLUCOSE: CPT

## 2021-08-08 PROCEDURE — 86850 RBC ANTIBODY SCREEN: CPT | Performed by: INTERNAL MEDICINE

## 2021-08-08 PROCEDURE — 86900 BLOOD TYPING SEROLOGIC ABO: CPT | Performed by: INTERNAL MEDICINE

## 2021-08-08 PROCEDURE — 83540 ASSAY OF IRON: CPT | Performed by: INTERNAL MEDICINE

## 2021-08-08 PROCEDURE — P9016 RBC LEUKOCYTES REDUCED: HCPCS

## 2021-08-08 PROCEDURE — P9017 PLASMA 1 DONOR FRZ W/IN 8 HR: HCPCS

## 2021-08-08 PROCEDURE — 30233N1 TRANSFUSION OF NONAUTOLOGOUS RED BLOOD CELLS INTO PERIPHERAL VEIN, PERCUTANEOUS APPROACH: ICD-10-PCS | Performed by: INTERNAL MEDICINE

## 2021-08-08 PROCEDURE — 85027 COMPLETE CBC AUTOMATED: CPT | Performed by: INTERNAL MEDICINE

## 2021-08-08 PROCEDURE — 85014 HEMATOCRIT: CPT | Performed by: INTERNAL MEDICINE

## 2021-08-08 PROCEDURE — 86920 COMPATIBILITY TEST SPIN: CPT

## 2021-08-08 PROCEDURE — 99233 SBSQ HOSP IP/OBS HIGH 50: CPT | Performed by: INTERNAL MEDICINE

## 2021-08-08 PROCEDURE — 87040 BLOOD CULTURE FOR BACTERIA: CPT | Performed by: INTERNAL MEDICINE

## 2021-08-08 PROCEDURE — 82728 ASSAY OF FERRITIN: CPT | Performed by: INTERNAL MEDICINE

## 2021-08-08 PROCEDURE — 82607 VITAMIN B-12: CPT | Performed by: INTERNAL MEDICINE

## 2021-08-08 PROCEDURE — C9113 INJ PANTOPRAZOLE SODIUM, VIA: HCPCS | Performed by: INTERNAL MEDICINE

## 2021-08-08 PROCEDURE — 85610 PROTHROMBIN TIME: CPT | Performed by: INTERNAL MEDICINE

## 2021-08-08 RX ORDER — PHYTONADIONE 10 MG/ML
5 INJECTION, EMULSION INTRAMUSCULAR; INTRAVENOUS; SUBCUTANEOUS ONCE
Status: COMPLETED | OUTPATIENT
Start: 2021-08-08 | End: 2021-08-08

## 2021-08-08 RX ORDER — PANTOPRAZOLE SODIUM 40 MG/1
40 INJECTION, POWDER, FOR SOLUTION INTRAVENOUS EVERY 12 HOURS SCHEDULED
Status: DISCONTINUED | OUTPATIENT
Start: 2021-08-08 | End: 2021-08-12 | Stop reason: HOSPADM

## 2021-08-08 RX ORDER — CEFTRIAXONE 1 G/50ML
1000 INJECTION, SOLUTION INTRAVENOUS EVERY 24 HOURS
Status: DISCONTINUED | OUTPATIENT
Start: 2021-08-09 | End: 2021-08-11

## 2021-08-08 RX ADMIN — FAMOTIDINE 20 MG: 20 TABLET, FILM COATED ORAL at 09:25

## 2021-08-08 RX ADMIN — PHYTONADIONE 5 MG: 10 INJECTION, EMULSION INTRAMUSCULAR; INTRAVENOUS; SUBCUTANEOUS at 09:31

## 2021-08-08 RX ADMIN — PIPERACILLIN AND TAZOBACTAM 3.38 G: 3; .375 INJECTION, POWDER, FOR SOLUTION INTRAVENOUS at 09:44

## 2021-08-08 RX ADMIN — CYANOCOBALAMIN TAB 500 MCG 500 MCG: 500 TAB at 09:25

## 2021-08-08 RX ADMIN — DULOXETINE HYDROCHLORIDE 30 MG: 30 CAPSULE, DELAYED RELEASE ORAL at 18:52

## 2021-08-08 RX ADMIN — LEVOTHYROXINE SODIUM 100 MCG: 150 TABLET ORAL at 06:32

## 2021-08-08 RX ADMIN — PANTOPRAZOLE SODIUM 40 MG: 40 INJECTION, POWDER, FOR SOLUTION INTRAVENOUS at 20:32

## 2021-08-08 RX ADMIN — SODIUM CHLORIDE, SODIUM LACTATE, POTASSIUM CHLORIDE, AND CALCIUM CHLORIDE 75 ML/HR: .6; .31; .03; .02 INJECTION, SOLUTION INTRAVENOUS at 12:02

## 2021-08-08 RX ADMIN — PIPERACILLIN AND TAZOBACTAM 3.38 G: 3; .375 INJECTION, POWDER, FOR SOLUTION INTRAVENOUS at 04:24

## 2021-08-08 RX ADMIN — SENNOSIDES 8.6 MG: 8.6 TABLET, FILM COATED ORAL at 09:25

## 2021-08-08 RX ADMIN — METOPROLOL TARTRATE 25 MG: 25 TABLET, FILM COATED ORAL at 20:36

## 2021-08-08 RX ADMIN — ATORVASTATIN CALCIUM 10 MG: 10 TABLET, FILM COATED ORAL at 09:25

## 2021-08-08 RX ADMIN — DOCUSATE SODIUM 100 MG: 100 CAPSULE, LIQUID FILLED ORAL at 09:24

## 2021-08-08 RX ADMIN — DOCUSATE SODIUM 100 MG: 100 CAPSULE, LIQUID FILLED ORAL at 18:52

## 2021-08-08 RX ADMIN — LORAZEPAM 0.5 MG: 0.5 TABLET ORAL at 20:36

## 2021-08-08 RX ADMIN — PANTOPRAZOLE SODIUM 40 MG: 40 TABLET, DELAYED RELEASE ORAL at 06:32

## 2021-08-08 RX ADMIN — FERROUS SULFATE TAB 325 MG (65 MG ELEMENTAL FE) 325 MG: 325 (65 FE) TAB at 09:25

## 2021-08-08 RX ADMIN — CHOLECALCIFEROL TAB 10 MCG (400 UNIT) 400 UNITS: 10 TAB at 09:24

## 2021-08-08 RX ADMIN — DULOXETINE HYDROCHLORIDE 30 MG: 30 CAPSULE, DELAYED RELEASE ORAL at 09:25

## 2021-08-08 RX ADMIN — PIPERACILLIN AND TAZOBACTAM 3.38 G: 3; .375 INJECTION, POWDER, FOR SOLUTION INTRAVENOUS at 15:34

## 2021-08-08 NOTE — PLAN OF CARE
Problem: Potential for Falls  Goal: Patient will remain free of falls  Description: INTERVENTIONS:  - Educate patient/family on patient safety including physical limitations  - Instruct patient to call for assistance with activity   - Consult OT/PT to assist with strengthening/mobility   - Keep Call bell within reach  - Keep bed low and locked with side rails adjusted as appropriate  - Keep care items and personal belongings within reach  - Initiate and maintain comfort rounds  - Make Fall Risk Sign visible to staff  - Offer Toileting every  Hours, in advance of need  - Initiate/Maintain alarm  - Obtain necessary fall risk management equipment:   - Apply yellow socks and bracelet for high fall risk patients  - Consider moving patient to room near nurses station  Outcome: Progressing     Problem: MOBILITY - ADULT  Goal: Maintain or return to baseline ADL function  Description: INTERVENTIONS:  -  Assess patient's ability to carry out ADLs; assess patient's baseline for ADL function and identify physical deficits which impact ability to perform ADLs (bathing, care of mouth/teeth, toileting, grooming, dressing, etc )  - Assess/evaluate cause of self-care deficits   - Assess range of motion  - Assess patient's mobility; develop plan if impaired  - Assess patient's need for assistive devices and provide as appropriate  - Encourage maximum independence but intervene and supervise when necessary  - Involve family in performance of ADLs  - Assess for home care needs following discharge   - Consider OT consult to assist with ADL evaluation and planning for discharge  - Provide patient education as appropriate  Outcome: Progressing  Goal: Maintains/Returns to pre admission functional level  Description: INTERVENTIONS:  - Perform BMAT or MOVE assessment daily    - Set and communicate daily mobility goal to care team and patient/family/caregiver     - Collaborate with rehabilitation services on mobility goals if consulted  - Perform Range of Motion  times a day  - Reposition patient every  hours    - Dangle patient  times a day  - Stand patient  times a day  - Ambulate patient  times a day  - Out of bed to chair  times a day   - Out of bed for meals  times a day  - Out of bed for toileting  - Record patient progress and toleration of activity level   Outcome: Progressing     Problem: PAIN - ADULT  Goal: Verbalizes/displays adequate comfort level or baseline comfort level  Description: Interventions:  - Encourage patient to monitor pain and request assistance  - Assess pain using appropriate pain scale  - Administer analgesics based on type and severity of pain and evaluate response  - Implement non-pharmacological measures as appropriate and evaluate response  - Consider cultural and social influences on pain and pain management  - Notify physician/advanced practitioner if interventions unsuccessful or patient reports new pain  Outcome: Progressing     Problem: INFECTION - ADULT  Goal: Absence or prevention of progression during hospitalization  Description: INTERVENTIONS:  - Assess and monitor for signs and symptoms of infection  - Monitor lab/diagnostic results  - Monitor all insertion sites, i e  indwelling lines, tubes, and drains  - Monitor endotracheal if appropriate and nasal secretions for changes in amount and color  - [place] appropriate cooling/warming therapies per order  - Administer medications as ordered  - Instruct and encourage patient and family to use good hand hygiene technique  - Identify and instruct in appropriate isolation precautions for identified infection/condition  Outcome: Progressing  Goal: Absence of fever/infection during neutropenic period  Description: INTERVENTIONS:  - Monitor WBC    Outcome: Progressing     Problem: SAFETY ADULT  Goal: Patient will remain free of falls  Description: INTERVENTIONS:  - Educate patient/family on patient safety including physical limitations  - Instruct patient to call for assistance with activity   - Consult OT/PT to assist with strengthening/mobility   - Keep Call bell within reach  - Keep bed low and locked with side rails adjusted as appropriate  - Keep care items and personal belongings within reach  - Initiate and maintain comfort rounds  - Make Fall Risk Sign visible to staff  - Offer Toileting every  Hours, in advance of need  - Initiate/Maintain alarm  - Obtain necessary fall risk management equipment:   - Apply yellow socks and bracelet for high fall risk patients  - Consider moving patient to room near nurses station  Outcome: Progressing  Goal: Maintain or return to baseline ADL function  Description: INTERVENTIONS:  -  Assess patient's ability to carry out ADLs; assess patient's baseline for ADL function and identify physical deficits which impact ability to perform ADLs (bathing, care of mouth/teeth, toileting, grooming, dressing, etc )  - Assess/evaluate cause of self-care deficits   - Assess range of motion  - Assess patient's mobility; develop plan if impaired  - Assess patient's need for assistive devices and provide as appropriate  - Encourage maximum independence but intervene and supervise when necessary  - Involve family in performance of ADLs  - Assess for home care needs following discharge   - Consider OT consult to assist with ADL evaluation and planning for discharge  - Provide patient education as appropriate  Outcome: Progressing  Goal: Maintains/Returns to pre admission functional level  Description: INTERVENTIONS:  - Perform BMAT or MOVE assessment daily    - Set and communicate daily mobility goal to care team and patient/family/caregiver  - Collaborate with rehabilitation services on mobility goals if consulted  - Perform Range of Motion  times a day  - Reposition patient every  hours    - Dangle patient  times a day  - Stand patient  times a day  - Ambulate patient  times a day  - Out of bed to chair  times a day   - Out of bed for meals times a day  - Out of bed for toileting  - Record patient progress and toleration of activity level   Outcome: Progressing     Problem: DISCHARGE PLANNING  Goal: Discharge to home or other facility with appropriate resources  Description: INTERVENTIONS:  - Identify barriers to discharge w/patient and caregiver  - Arrange for needed discharge resources and transportation as appropriate  - Identify discharge learning needs (meds, wound care, etc )  - Arrange for interpretive services to assist at discharge as needed  - Refer to Case Management Department for coordinating discharge planning if the patient needs post-hospital services based on physician/advanced practitioner order or complex needs related to functional status, cognitive ability, or social support system  Outcome: Progressing     Problem: Knowledge Deficit  Goal: Patient/family/caregiver demonstrates understanding of disease process, treatment plan, medications, and discharge instructions  Description: Complete learning assessment and assess knowledge base    Interventions:  - Provide teaching at level of understanding  - Provide teaching via preferred learning methods  Outcome: Progressing     Problem: Prexisting or High Potential for Compromised Skin Integrity  Goal: Skin integrity is maintained or improved  Description: INTERVENTIONS:  - Identify patients at risk for skin breakdown  - Assess and monitor skin integrity  - Assess and monitor nutrition and hydration status  - Monitor labs   - Assess for incontinence   - Turn and reposition patient  - Assist with mobility/ambulation  - Relieve pressure over bony prominences  - Avoid friction and shearing  - Provide appropriate hygiene as needed including keeping skin clean and dry  - Evaluate need for skin moisturizer/barrier cream  - Collaborate with interdisciplinary team   - Patient/family teaching  - Consider wound care consult   Outcome: Progressing

## 2021-08-08 NOTE — H&P (VIEW-ONLY)
Consultation - [facility]  Enrique Jett 57 y o  female MRN: [id number]  Unit/Bed#: -01 Encounter: [id number]        Consults    ASSESSMENT/PLAN:     Morbidly obese 71-year-old female with a history of multiple abdominal surgeries, admitted for abdominal pain found to have urinary tract infection with improvement in abdominal pain after antibiotics  Noted to have significant macrocytic anemia and Coumadin coagulopathy  1  Macrocytic anemia:  Likely multifactorial, doubt acute GI blood loss  -recommend EGD to exclude peptic ulcer disease given elevated BUN  -patient does have significant coagulopathy at this time with no evidence of overt bleeding, recommend EGD only if INR is less than 2 5, please correct tonight  -this EGD and colonoscopy could be performed electively later in her hospital stay, patient does have some difficulty with ambulation and required anticoagulation will make this difficult as an outpatient  -follow hemoglobin, transfuse as clinically indicated  -recommend checking iron studies, I46 and folic acid studies    2  Lower abdominal pain:  Resolved after treatment for urinary tract infection, no evidence of obstruction on CT scan, a benign abdominal examination today          ______________________________________________________________________    Reason for Consult / Principal Problem: Abdominal pain    HPI: Enrique Jett is a 57y o  year old female who presents with Abdominal pain leaving consult for abdominal pain and anemia  This is a morbidly obese 71-year-old female with a history of renal cancer, status post nephrectomy, hysterectomy, cholecystectomy, reports that she was having significant abdominal pain radiating from her epigastrium to her lower abdomen for about 2 days prior to admission  Upon presentation she was found to have urinary tract infection, started on antibiotics patient reports that the symptoms have largely resolved    She does report having a longstanding history of chronic constipation despite taking MiraLax and stool softeners at home  Denies any melena, rectal bleeding, nausea, vomiting, dysphagia  Has some intermittent reflux symptoms but has been on PPI therapy which has been effective  Patient is a poor historian, per the chart there is a history of EGD and colonoscopy from 3 years ago with limited bowel prep, no source of bleeding at that time  Patient was noted to have macrocytic indices, she does report receiving B12 injections in the past, currently taking oral B12  On admission laboratories she is found to have significant macrocytic anemia, in the setting of Coumadin coagulopathy with no evidence of overt bleeding  Also found to have a large ventral hernia  Review of Systems: The remainder of the review of systems was negative except for the pertinent positives noted in HPI  Historical Information   Past Medical History:   Diagnosis Date    Anemia     Arthritis     [facility]     Chronic kidney disease     [facility]     Disease of thyroid gland     HLD (hyperlipidemia)     Hypertension     [facility]     Pneumonia      Past Surgical History:   Procedure Laterality Date    CHOLECYSTECTOMY      GALLBLADDER SURGERY  05/01/2004    HYSTERECTOMY  06/01/2018    NEPHRECTOMY Right 08/02/2018     Social History   Social History     Substance and Sexual Activity   Alcohol Use Never    Comment: n/a     Social History     Substance and Sexual Activity   Drug Use Yes    Types: Marijuana    Comment: smokes with girlfriend when anxious     Social History     Tobacco Use   Smoking Status Former Smoker    Packs/day: 3 00    Years: 30 00    Pack years: 90 00    Quit date: 10/22/2010    Years since quitting: 10 8   Smokeless Tobacco Former User    Quit date: 9/1/2011   Tobacco Comment    quit approx   9 years ago     Family History   Problem Relation Age of Onset    No Known Problems Mother     No Known Problems Father        Meds/Allergies     Medications Prior to Admission   Medication    albuterol (2 5 mg/3 mL) 0 083 % nebulizer solution    albuterol (PROVENTIL HFA,VENTOLIN HFA) 90 mcg/act inhaler    atorvastatin (LIPITOR) 10 mg tablet    DULoxetine (CYMBALTA) 30 mg delayed release capsule    famotidine (PEPCID) 20 mg tablet    ferrous sulfate 325 (65 Fe) mg tablet    levothyroxine 100 mcg tablet    LORazepam (ATIVAN) 0 5 mg tablet    metoprolol succinate (TOPROL-XL) 25 mg 24 hr tablet    pantoprazole (PROTONIX) 40 mg tablet    vitamin B-12 (VITAMIN B-12) 500 mcg tablet    warfarin (COUMADIN) 1 mg tablet    warfarin (COUMADIN) 3 mg tablet    cholecalciferol (VITAMIN D3) 400 units tablet    ipratropium (ATROVENT) 0 02 % nebulizer solution    levothyroxine 100 mcg tablet    LORazepam (ATIVAN) 0 5 mg tablet    triamcinolone (KENALOG) 0 5 % cream     Current Facility-Administered Medications   Medication Dose Route Frequency    acetaminophen (TYLENOL) tablet 650 mg  650 mg Oral Q6H PRN    albuterol (PROVENTIL HFA,VENTOLIN HFA) inhaler 1 puff  1 puff Inhalation Q6H PRN    atorvastatin (LIPITOR) tablet 10 mg  10 mg Oral Daily    cholecalciferol (VITAMIN D3) tablet 400 Units  400 Units Oral Daily    cyanocobalamin (VITAMIN B-12) tablet 500 mcg  500 mcg Oral Daily    docusate sodium (COLACE) capsule 100 mg  100 mg Oral BID    DULoxetine (CYMBALTA) delayed release capsule 30 mg  30 mg Oral BID    famotidine (PEPCID) tablet 20 mg  20 mg Oral Daily    ferrous sulfate tablet 325 mg  325 mg Oral Daily With Breakfast    insulin lispro (HumaLOG) 100 units/mL subcutaneous injection 1-5 Units  1-5 Units Subcutaneous TID AC    insulin lispro (HumaLOG) 100 units/mL subcutaneous injection 1-5 Units  1-5 Units Subcutaneous HS    lactated ringers infusion  75 mL/hr Intravenous Continuous    levothyroxine tablet 100 mcg  100 mcg Oral Early Morning    LORazepam (ATIVAN) tablet 0 5 mg  0 5 mg Oral Q8H PRN    metoprolol (LOPRESSOR) injection 2 5 mg  2 5 mg Intravenous Q6H PRN    metoprolol tartrate (LOPRESSOR) tablet 25 mg  25 mg Oral Q12H LINO    morphine injection 2 mg  2 mg Intravenous Q4H PRN    pantoprazole (PROTONIX) EC tablet 40 mg  40 mg Oral Early Morning    piperacillin-tazobactam (ZOSYN) 3 375 g in sodium chloride 0 9 % 100 mL IVPB  3 375 g Intravenous Q6H    senna (SENOKOT) tablet 8 6 mg  1 tablet Oral Daily       Allergies   Allergen Reactions    Flu Virus Vaccine Other (See Comments)     "I got a line up my arm"       Objective     Blood pressure 95/61, pulse 99, temperature 97 7 °F (36 5 °C), temperature source Tympanic, resp  rate 20, height 5' 4" (1 626 m), weight (!) 136 kg (300 lb 11 3 oz), SpO2 99 %, not currently breastfeeding        Intake/Output Summary (Last 24 hours) at 8/8/2021 1205  Last data filed at 8/8/2021 0424  Gross per 24 hour   Intake 1100 ml   Output 900 ml   Net 200 ml       PHYSICAL EXAM     GEN:  Morbidly obese, no acute distress  HEENT: anicteric, MMM, no cervical or supraclavicular lymphadenopathy  CV:  Irregularly irregular, no m/r/g  CHEST: CTA b/l, no WRR  ABD: +BS, soft, NT/ND, no hepatosplenomegaly, abdominal surgical scars and large ventral hernia  EXT:  Chronic stasis change  SKIN: no rashes,  NEURO: aaox3    Lab Results:   Admission on 08/07/2021   Component Date Value    WBC 08/07/2021 16 52*    RBC 08/07/2021 2 77*    Hemoglobin 08/07/2021 8 9*    Hematocrit 08/07/2021 28 6*    MCV 08/07/2021 103*    MCH 08/07/2021 32 1     MCHC 08/07/2021 31 1*    RDW 08/07/2021 15 0     MPV 08/07/2021 10 1     Platelets 22/90/9526 383     Neutrophils Relative 08/07/2021 80*    Immat GRANS % 08/07/2021 1     Lymphocytes Relative 08/07/2021 11*    Monocytes Relative 08/07/2021 7     Eosinophils Relative 08/07/2021 1     Basophils Relative 08/07/2021 0     Neutrophils Absolute 08/07/2021 13 14*    Immature Grans Absolute 08/07/2021 0 19     Lymphocytes Absolute 08/07/2021 1 82     Monocytes Absolute 08/07/2021 1 22     Eosinophils Absolute 08/07/2021 0 11     Basophils Absolute 08/07/2021 0 04     Sodium 08/07/2021 137     Potassium 08/07/2021 5 9*    Chloride 08/07/2021 103     CO2 08/07/2021 23     ANION GAP 08/07/2021 11     BUN 08/07/2021 78*    Creatinine 08/07/2021 1 74*    Glucose 08/07/2021 197*    Calcium 08/07/2021 9 2     Corrected Calcium 08/07/2021 9 7     AST 08/07/2021 12*    ALT 08/07/2021 9     Alkaline Phosphatase 08/07/2021 95 8     Total Protein 08/07/2021 7 2     Albumin 08/07/2021 3 4     Total Bilirubin 08/07/2021 0 32     eGFR 08/07/2021 31     Lipase 08/07/2021 20     LACTIC ACID 08/07/2021 2 0     Troponin I 08/07/2021 <0 03     Color, UA 08/07/2021 Yellow     Clarity, UA 08/07/2021 Clear     Specific Gravity, UA 08/07/2021 1 010     pH, UA 08/07/2021 5 5     Leukocytes, UA 08/07/2021 1+*    Nitrite, UA 08/07/2021 Negative     Protein, UA 08/07/2021 Negative     Glucose, UA 08/07/2021 Negative     Ketones, UA 08/07/2021 Negative     Urobilinogen, UA 08/07/2021 0 2     Bilirubin, UA 08/07/2021 Negative     Blood, UA 08/07/2021 2+*    Protime 08/07/2021 56 0*    INR 08/07/2021 5 37*    PTT 08/07/2021 49*    RBC, UA 08/07/2021 1-2     WBC, UA 08/07/2021 4-10*    Epithelial Cells 08/07/2021 Moderate*    Bacteria, UA 08/07/2021 Occasional     TSH 3RD GENERATON 08/07/2021 6 858*    Free T4 08/07/2021 1 18     POC Glucose 08/07/2021 182*    Sodium 08/08/2021 140     Potassium 08/08/2021 5 3*    Chloride 08/08/2021 105     CO2 08/08/2021 29     ANION GAP 08/08/2021 6     BUN 08/08/2021 79*    Creatinine 08/08/2021 1 88*    Glucose 08/08/2021 124     Glucose, Fasting 08/08/2021 124*    Calcium 08/08/2021 8 6     eGFR 08/08/2021 28     WBC 08/08/2021 13 07*    RBC 08/08/2021 2 03*    Hemoglobin 08/08/2021 6 4*    Hematocrit 08/08/2021 21 1*    MCV 08/08/2021 104*    MCH 08/08/2021 31 5     MCHC 08/08/2021 30 3*    RDW 08/08/2021 15 3*    Platelets 57/44/9318 308     MPV 08/08/2021 9 8     Protime 08/08/2021 54 0*    INR 08/08/2021 5 17*    POC Glucose 08/08/2021 143*    POC Glucose 08/08/2021 167*     Imaging Studies: I have personally reviewed pertinent reports

## 2021-08-08 NOTE — PLAN OF CARE
Problem: Potential for Falls  Goal: Patient will remain free of falls  Description: INTERVENTIONS:  - Educate patient/family on patient safety including physical limitations  - Instruct patient to call for assistance with activity   - Consult OT/PT to assist with strengthening/mobility   - Keep Call bell within reach  - Keep bed low and locked with side rails adjusted as appropriate  - Keep care items and personal belongings within reach  - Initiate and maintain comfort rounds  - Make Fall Risk Sign visible to staff  - Offer Toileting every 2 Hours, in advance of need  - Initiate/Maintain bed alarm  - Apply yellow socks and bracelet for high fall risk patients  - Consider moving patient to room near nurses station  Outcome: Progressing     Problem: MOBILITY - ADULT  Goal: Maintain or return to baseline ADL function  Description: INTERVENTIONS:  -  Assess patient's ability to carry out ADLs; assess patient's baseline for ADL function and identify physical deficits which impact ability to perform ADLs (bathing, care of mouth/teeth, toileting, grooming, dressing, etc )  - Assess/evaluate cause of self-care deficits   - Assess range of motion  - Assess patient's mobility; develop plan if impaired  - Assess patient's need for assistive devices and provide as appropriate  - Encourage maximum independence but intervene and supervise when necessary  - Involve family in performance of ADLs  - Assess for home care needs following discharge   - Consider OT consult to assist with ADL evaluation and planning for discharge  - Provide patient education as appropriate  Outcome: Progressing  Goal: Maintains/Returns to pre admission functional level  Description: INTERVENTIONS:  - Perform BMAT or MOVE assessment daily    - Set and communicate daily mobility goal to care team and patient/family/caregiver  - Collaborate with rehabilitation services on mobility goals if consulted    - Reposition patient every 2 hours      - Out of bed to chair   - Out of bed for meals -   Record patient progress and toleration of activity level   Outcome: Progressing     Problem: PAIN - ADULT  Goal: Verbalizes/displays adequate comfort level or baseline comfort level  Description: Interventions:  - Encourage patient to monitor pain and request assistance  - Assess pain using appropriate pain scale  - Administer analgesics based on type and severity of pain and evaluate response  - Implement non-pharmacological measures as appropriate and evaluate response  - Consider cultural and social influences on pain and pain management  - Notify physician/advanced practitioner if interventions unsuccessful or patient reports new pain  Outcome: Progressing     Problem: INFECTION - ADULT  Goal: Absence or prevention of progression during hospitalization  Description: INTERVENTIONS:  - Assess and monitor for signs and symptoms of infection  - Monitor lab/diagnostic results  - Monitor all insertion sites, i e  indwelling lines, tubes, and drains  - Monitor endotracheal if appropriate and nasal secretions for changes in amount and color  - [place] appropriate cooling/warming therapies per order  - Administer medications as ordered  - Instruct and encourage patient and family to use good hand hygiene technique  - Identify and instruct in appropriate isolation precautions for identified infection/condition  Outcome: Progressing  Goal: Absence of fever/infection during neutropenic period  Description: INTERVENTIONS:  - Monitor WBC    Outcome: Progressing     Problem: SAFETY ADULT  Goal: Patient will remain free of falls  Description: INTERVENTIONS:  - Educate patient/family on patient safety including physical limitations  - Instruct patient to call for assistance with activity   - Consult OT/PT to assist with strengthening/mobility   - Keep Call bell within reach  - Keep bed low and locked with side rails adjusted as appropriate  - Keep care items and personal belongings within reach  - Initiate and maintain comfort rounds  - Make Fall Risk Sign visible to staff  - Offer Toileting every 2 Hours, in advance of need      - Apply yellow socks and bracelet for high fall risk patients  - Consider moving patient to room near nurses station  Outcome: Progressing  Goal: Maintain or return to baseline ADL function  Description: INTERVENTIONS:  -  Assess patient's ability to carry out ADLs; assess patient's baseline for ADL function and identify physical deficits which impact ability to perform ADLs (bathing, care of mouth/teeth, toileting, grooming, dressing, etc )  - Assess/evaluate cause of self-care deficits   - Assess range of motion  - Assess patient's mobility; develop plan if impaired  - Assess patient's need for assistive devices and provide as appropriate  - Encourage maximum independence but intervene and supervise when necessary  - Involve family in performance of ADLs  - Assess for home care needs following discharge   - Consider OT consult to assist with ADL evaluation and planning for discharge  - Provide patient education as appropriate  Outcome: Progressing  Goal: Maintains/Returns to pre admission functional level  Description: INTERVENTIONS:  - Perform BMAT or MOVE assessment daily    - Set and communicate daily mobility goal to care team and patient/family/caregiver     - Collaborate with rehabilitation services on mobility goals if consulted    - Out of bed for toileting  - Record patient progress and toleration of activity level   Outcome: Progressing     Problem: DISCHARGE PLANNING  Goal: Discharge to home or other facility with appropriate resources  Description: INTERVENTIONS:  - Identify barriers to discharge w/patient and caregiver  - Arrange for needed discharge resources and transportation as appropriate  - Identify discharge learning needs (meds, wound care, etc )  - Arrange for interpretive services to assist at discharge as needed  - Refer to Case Management Department for coordinating discharge planning if the patient needs post-hospital services based on physician/advanced practitioner order or complex needs related to functional status, cognitive ability, or social support system  Outcome: Progressing     Problem: Knowledge Deficit  Goal: Patient/family/caregiver demonstrates understanding of disease process, treatment plan, medications, and discharge instructions  Description: Complete learning assessment and assess knowledge base    Interventions:  - Provide teaching at level of understanding  - Provide teaching via preferred learning methods  Outcome: Progressing

## 2021-08-08 NOTE — ASSESSMENT & PLAN NOTE
Lab Results   Component Value Date    HGBA1C 6 3 03/23/2021       Recent Labs     08/07/21 2046 08/08/21  0710 08/08/21  1147   POCGLU 182* 143* 167*       Blood Sugar Average: Last 72 hrs:  (P) 164 will give sliding scale coverage and monitor blood glucose

## 2021-08-08 NOTE — ASSESSMENT & PLAN NOTE
Unclear etiology-patient has diffuse abdominal pain CT of the abdomen without contrast shows no acute abnormality in the abdomen or pelvis  Chronic large ventral hernia containing several loops of small intestine and the portion of cecum without evidence of incarceration noted  Patient has UTI and will give IV antibiotic with Zosyn  Lipase within normal limit lactic acid is 2  Consider General surgery input if no improvement  Abdominal pain has resolved  Patient noted to have severe anemia-no evidence of active GI bleed-will receive PRBC transfusion and FFP as and INR is elevated and also give vitamin K  Patient evaluated by gastroenterology and is scheduled for EGD in [facility]

## 2021-08-08 NOTE — PROGRESS NOTES
Christa U  65   Progress Note - Radha Hernández 1960, 57 y o  female MRN: [id number]  Unit/Bed#: -01 Encounter: [id number]  Primary Care Provider: Yojana Amezcua MD   Date and time admitted to hospital: 8/7/2021  1:18 PM    * Abdominal pain  Assessment & Plan  Unclear etiology-patient has diffuse abdominal pain CT of the abdomen without contrast shows no acute abnormality in the abdomen or pelvis  Chronic large ventral hernia containing several loops of small intestine and the portion of cecum without evidence of incarceration noted  Patient has UTI and will give IV antibiotic with Zosyn  Lipase within normal limit lactic acid is 2  Consider General surgery input if no improvement  Abdominal pain has resolved  Patient noted to have severe anemia-no evidence of active GI bleed-will receive PRBC transfusion and FFP as and INR is elevated and also give vitamin K  Patient evaluated by gastroenterology and is scheduled for EGD in a        Diabetes mellitus [facility])  Assessment & Plan  Lab Results   Component Value Date    HGBA1C 6 3 03/23/2021       Recent Labs     08/07/21  2046 08/08/21  0710 08/08/21  1147   POCGLU 182* 143* 167*       Blood Sugar Average: Last 72 hrs:  (P) 164 will give sliding scale coverage and monitor blood glucose    Hypothyroid  Assessment & Plan  Continue levothyroxine    COPD with asthma ([facility] [address] )  Assessment & Plan  Stable-will resume nebulizer p r n  Paroxysmal atrial fibrillation (HCC)  Assessment & Plan  AFib with RVR-heart rate is in 120s to 130s-patient received 5 mg of metoprolol IV, will give p o  Metoprolol 25 b i d  And monitor  Potassium is 5 9 and will correct electrolytes  Will place on cardiac monitor  INR supratherapeutic and will hold Coumadin  Consider to consult Cardiology continues to have uncontrolled heart rate      Anemia due to stage 3b chronic kidney disease [facility])  Assessment & Plan  Lab Results   Component Value Date EGFR 28 08/08/2021    EGFR 31 08/07/2021    EGFR 32 05/13/2021    CREATININE 1 88 (H) 08/08/2021    CREATININE 1 74 (H) 08/07/2021    CREATININE 1 71 (H) 05/13/2021   Monitor hemoglobin-patient has no active GI bleed  INR is supratherapeutic and will continue to hold Coumadin  Microcytic anemia-Hemoglobin dropped to 6 4 today and has no active GI bleed  GI evaluation was obtained and recommend EGD  Will continue to hold Coumadin and give PRBC transfusion and FFP and monitor hemoglobin  Will give PPI IV  Will check vitamin B12 level  Subjective/Objective     Subjective:   Patient is feeling better  Abdominal pain has resolved  No nausea vomiting noted  However noted to have hemoglobin of 6 4  No active GI bleed noted no blood per rectum  Objective:  Vitals: Blood pressure (!) 86/56, pulse 64, temperature 98 6 °F (37 °C), temperature source (P) Tympanic, resp  rate (!) 97, height 5' 4" (1 626 m), weight (!) 136 kg (300 lb 11 3 oz), SpO2 (P) 100 %, not currently breastfeeding  ,Body mass index is 51 62 kg/m²  Intake/Output Summary (Last 24 hours) at 8/8/2021 1728  Last data filed at 8/8/2021 1711  Gross per 24 hour   Intake 1296 ml   Output 900 ml   Net 396 ml       Invasive Devices     Peripheral Intravenous Line            Peripheral IV 08/07/21 Left Antecubital 1 day                Physical Exam:   HEENT-PERRLA, moist oral mucosa  Neck-supple, no JVD elevation   Respiratory-equal air entry bilaterally, no rales or rhonchi  Cardiovascular system-S1, S2 heard, no murmur or gallops or rubs  Abdomen-soft, nontender, no guarding or rigidity, bowel sounds heard  Extremities-no pedal edema  Peripheral pulses palpable  Musculoskeletal-no contractures  Central nervous system-no acute focal neurological deficit ,no sensory or motor deficit noted  Skin-no rash noted          Lab, Imaging and other studies: I have personally reviewed pertinent reports      VTE Pharmacologic Prophylaxis: Warfarin (Coumadin)  VTE Mechanical Prophylaxis: sequential compression device

## 2021-08-08 NOTE — ASSESSMENT & PLAN NOTE
Lab Results   Component Value Date    EGFR 28 08/08/2021    EGFR 31 08/07/2021    EGFR 32 05/13/2021    CREATININE 1 88 (H) 08/08/2021    CREATININE 1 74 (H) 08/07/2021    CREATININE 1 71 (H) 05/13/2021   Monitor hemoglobin-patient has no active GI bleed  INR is supratherapeutic and will continue to hold Coumadin  Microcytic anemia-Hemoglobin dropped to 6 4 today and has no active GI bleed  GI evaluation was obtained and recommend EGD  Will continue to hold Coumadin and give PRBC transfusion and FFP and monitor hemoglobin  Will give PPI IV  Will check vitamin B12 level

## 2021-08-08 NOTE — CONSULTS
Consultation - [address] [facility]  Cyrus Herrera 57 y o  female MRN: [id number]  Unit/Bed#: -01 Encounter: [id number]        Consults    ASSESSMENT/PLAN:     Morbidly obese 57-year-old female with a history of multiple abdominal surgeries, admitted for abdominal pain found to have urinary tract infection with improvement in abdominal pain after antibiotics  Noted to have significant macrocytic anemia and Coumadin coagulopathy  1  Macrocytic anemia:  Likely multifactorial, doubt acute GI blood loss  -recommend EGD to exclude peptic ulcer disease given elevated BUN  -patient does have significant coagulopathy at this time with no evidence of overt bleeding, recommend EGD only if INR is less than 2 5, please correct tonight  -this EGD and colonoscopy could be performed electively later in her hospital stay, patient does have some difficulty with ambulation and required anticoagulation will make this difficult as an outpatient  -follow hemoglobin, transfuse as clinically indicated  -recommend checking iron studies, P03 and folic acid studies    2  Lower abdominal pain:  Resolved after treatment for urinary tract infection, no evidence of obstruction on CT scan, a benign abdominal examination today          ______________________________________________________________________    Reason for Consult / Principal Problem: Abdominal pain    HPI: Cyrus Herrera is a 57y o  year old female who presents with Abdominal pain leaving consult for abdominal pain and anemia  This is a morbidly obese 57-year-old female with a history of renal cancer, status post nephrectomy, hysterectomy, cholecystectomy, reports that she was having significant abdominal pain radiating from her epigastrium to her lower abdomen for about 2 days prior to admission  Upon presentation she was found to have urinary tract infection, started on antibiotics patient reports that the symptoms have largely resolved    She does report having a longstanding history of chronic constipation despite taking MiraLax and stool softeners at home  Denies any melena, rectal bleeding, nausea, vomiting, dysphagia  Has some intermittent reflux symptoms but has been on PPI therapy which has been effective  Patient is a poor historian, per the chart there is a history of EGD and colonoscopy from 3 years ago with limited bowel prep, no source of bleeding at that time  Patient was noted to have macrocytic indices, she does report receiving B12 injections in the past, currently taking oral B12  On admission laboratories she is found to have significant macrocytic anemia, in the setting of Coumadin coagulopathy with no evidence of overt bleeding  Also found to have a large ventral hernia  Review of Systems: The remainder of the review of systems was negative except for the pertinent positives noted in HPI  Historical Information   Past Medical History:   Diagnosis Date    Anemia     Arthritis     [email or web address]     Chronic kidney disease     Diabetes mellitus ([email or web address]     Disease of thyroid gland     HLD (hyperlipidemia)     Hypertension     Ovarian cancer ([facility] )     Pneumonia      Past Surgical History:   Procedure Laterality Date    CHOLECYSTECTOMY      GALLBLADDER SURGERY  05/01/2004    HYSTERECTOMY  06/01/2018    NEPHRECTOMY Right 08/02/2018     Social History   Social History     Substance and Sexual Activity   Alcohol Use Never    Comment: n/a     Social History     Substance and Sexual Activity   Drug Use Yes    Types: Marijuana    Comment: smokes with girlfriend when anxious     Social History     Tobacco Use   Smoking Status Former Smoker    Packs/day: 3 00    Years: 30 00    Pack years: 90 00    Quit date: 10/22/2010    Years since quitting: 10 8   Smokeless Tobacco Former User    Quit date: 9/1/2011   Tobacco Comment    quit approx   9 years ago     Family History   Problem Relation Age of Onset    No Known Problems Mother     No Known Problems Father        Meds/Allergies     Medications Prior to Admission   Medication    albuterol (2 5 mg/3 mL) 0 083 % nebulizer solution    albuterol (PROVENTIL HFA,VENTOLIN HFA) 90 mcg/act inhaler    atorvastatin (LIPITOR) 10 mg tablet    DULoxetine (CYMBALTA) 30 mg delayed release capsule    famotidine (PEPCID) 20 mg tablet    ferrous sulfate 325 (65 Fe) mg tablet    levothyroxine 100 mcg tablet    LORazepam (ATIVAN) 0 5 mg tablet    metoprolol succinate (TOPROL-XL) 25 mg 24 hr tablet    pantoprazole (PROTONIX) 40 mg tablet    vitamin B-12 (VITAMIN B-12) 500 mcg tablet    warfarin (COUMADIN) 1 mg tablet    warfarin (COUMADIN) 3 mg tablet    cholecalciferol (VITAMIN D3) 400 units tablet    ipratropium (ATROVENT) 0 02 % nebulizer solution    levothyroxine 100 mcg tablet    LORazepam (ATIVAN) 0 5 mg tablet    triamcinolone (KENALOG) 0 5 % cream     Current Facility-Administered Medications   Medication Dose Route Frequency    acetaminophen (TYLENOL) tablet 650 mg  650 mg Oral Q6H PRN    albuterol (PROVENTIL HFA,VENTOLIN HFA) inhaler 1 puff  1 puff Inhalation Q6H PRN    atorvastatin (LIPITOR) tablet 10 mg  10 mg Oral Daily    cholecalciferol (VITAMIN D3) tablet 400 Units  400 Units Oral Daily    cyanocobalamin (VITAMIN B-12) tablet 500 mcg  500 mcg Oral Daily    docusate sodium (COLACE) capsule 100 mg  100 mg Oral BID    DULoxetine (CYMBALTA) delayed release capsule 30 mg  30 mg Oral BID    famotidine (PEPCID) tablet 20 mg  20 mg Oral Daily    ferrous sulfate tablet 325 mg  325 mg Oral Daily With Breakfast    insulin lispro (HumaLOG) 100 units/mL subcutaneous injection 1-5 Units  1-5 Units Subcutaneous TID AC    insulin lispro (HumaLOG) 100 units/mL subcutaneous injection 1-5 Units  1-5 Units Subcutaneous HS    lactated ringers infusion  75 mL/hr Intravenous Continuous    levothyroxine tablet 100 mcg  100 mcg Oral Early Morning    LORazepam (ATIVAN) tablet 0 5 mg  0 5 mg Oral Q8H PRN    metoprolol (LOPRESSOR) injection 2 5 mg  2 5 mg Intravenous Q6H PRN    metoprolol tartrate (LOPRESSOR) tablet 25 mg  25 mg Oral Q12H LINO    morphine injection 2 mg  2 mg Intravenous Q4H PRN    pantoprazole (PROTONIX) EC tablet 40 mg  40 mg Oral Early Morning    piperacillin-tazobactam (ZOSYN) 3 375 g in sodium chloride 0 9 % 100 mL IVPB  3 375 g Intravenous Q6H    senna (SENOKOT) tablet 8 6 mg  1 tablet Oral Daily       Allergies   Allergen Reactions    Flu Virus Vaccine Other (See Comments)     "I got a line up my arm"       Objective     Blood pressure 95/61, pulse 99, temperature 97 7 °F (36 5 °C), temperature source Tympanic, resp  rate 20, height 5' 4" (1 626 m), weight (!) 136 kg (300 lb 11 3 oz), SpO2 99 %, not currently breastfeeding        Intake/Output Summary (Last 24 hours) at 8/8/2021 1205  Last data filed at 8/8/2021 0424  Gross per 24 hour   Intake 1100 ml   Output 900 ml   Net 200 ml       PHYSICAL EXAM     GEN:  Morbidly obese, no acute distress  HEENT: anicteric, MMM, no cervical or supraclavicular lymphadenopathy  CV:  Irregularly irregular, no m/r/g  CHEST: CTA b/l, no WRR  ABD: +BS, soft, NT/ND, no hepatosplenomegaly, abdominal surgical scars and large ventral hernia  EXT:  Chronic stasis change  SKIN: no rashes,  NEURO: aaox3    Lab Results:   Admission on 08/07/2021   Component Date Value    WBC 08/07/2021 16 52*    RBC 08/07/2021 2 77*    Hemoglobin 08/07/2021 8 9*    Hematocrit 08/07/2021 28 6*    MCV 08/07/2021 103*    MCH 08/07/2021 32 1     MCHC 08/07/2021 31 1*    RDW 08/07/2021 15 0     MPV 08/07/2021 10 1     Platelets 04/58/4677 383     Neutrophils Relative 08/07/2021 80*    Immat GRANS % 08/07/2021 1     Lymphocytes Relative 08/07/2021 11*    Monocytes Relative 08/07/2021 7     Eosinophils Relative 08/07/2021 1     Basophils Relative 08/07/2021 0     Neutrophils Absolute 08/07/2021 13 14*    Immature Grans Absolute 08/07/2021 0 19     Lymphocytes Absolute 08/07/2021 1 82     Monocytes Absolute 08/07/2021 1 22     Eosinophils Absolute 08/07/2021 0 11     Basophils Absolute 08/07/2021 0 04     Sodium 08/07/2021 137     Potassium 08/07/2021 5 9*    Chloride 08/07/2021 103     CO2 08/07/2021 23     ANION GAP 08/07/2021 11     BUN 08/07/2021 78*    Creatinine 08/07/2021 1 74*    Glucose 08/07/2021 197*    Calcium 08/07/2021 9 2     Corrected Calcium 08/07/2021 9 7     AST 08/07/2021 12*    ALT 08/07/2021 9     Alkaline Phosphatase 08/07/2021 95 8     Total Protein 08/07/2021 7 2     Albumin 08/07/2021 3 4     Total Bilirubin 08/07/2021 0 32     eGFR 08/07/2021 31     Lipase 08/07/2021 20     LACTIC ACID 08/07/2021 2 0     Troponin I 08/07/2021 <0 03     Color, UA 08/07/2021 Yellow     Clarity, UA 08/07/2021 Clear     Specific Gravity, UA 08/07/2021 1 010     pH, UA 08/07/2021 5 5     Leukocytes, UA 08/07/2021 1+*    Nitrite, UA 08/07/2021 Negative     Protein, UA 08/07/2021 Negative     Glucose, UA 08/07/2021 Negative     Ketones, UA 08/07/2021 Negative     Urobilinogen, UA 08/07/2021 0 2     Bilirubin, UA 08/07/2021 Negative     Blood, UA 08/07/2021 2+*    Protime 08/07/2021 56 0*    INR 08/07/2021 5 37*    PTT 08/07/2021 49*    RBC, UA 08/07/2021 1-2     WBC, UA 08/07/2021 4-10*    Epithelial Cells 08/07/2021 Moderate*    Bacteria, UA 08/07/2021 Occasional     TSH 3RD GENERATON 08/07/2021 6 858*    Free T4 08/07/2021 1 18     POC Glucose 08/07/2021 182*    Sodium 08/08/2021 140     Potassium 08/08/2021 5 3*    Chloride 08/08/2021 105     CO2 08/08/2021 29     ANION GAP 08/08/2021 6     BUN 08/08/2021 79*    Creatinine 08/08/2021 1 88*    Glucose 08/08/2021 124     Glucose, Fasting 08/08/2021 124*    Calcium 08/08/2021 8 6     eGFR 08/08/2021 28     WBC 08/08/2021 13 07*    RBC 08/08/2021 2 03*    Hemoglobin 08/08/2021 6 4*    Hematocrit 08/08/2021 21 1*    MCV 08/08/2021 104*    MCH 08/08/2021 31 5     MCHC 08/08/2021 30 3*    RDW 08/08/2021 15 3*    Platelets 68/29/9870 308     MPV 08/08/2021 9 8     Protime 08/08/2021 54 0*    INR 08/08/2021 5 17*    POC Glucose 08/08/2021 143*    POC Glucose 08/08/2021 167*     Imaging Studies: I have personally reviewed pertinent reports

## 2021-08-09 ENCOUNTER — ANESTHESIA (INPATIENT)
Dept: GASTROENTEROLOGY | Facility: HOSPITAL | Age: 61
DRG: 872 | End: 2021-08-09
Payer: MEDICARE

## 2021-08-09 ENCOUNTER — ANESTHESIA EVENT (INPATIENT)
Dept: GASTROENTEROLOGY | Facility: HOSPITAL | Age: 61
DRG: 872 | End: 2021-08-09
Payer: MEDICARE

## 2021-08-09 ENCOUNTER — APPOINTMENT (INPATIENT)
Dept: GASTROENTEROLOGY | Facility: HOSPITAL | Age: 61
DRG: 872 | End: 2021-08-09
Payer: MEDICARE

## 2021-08-09 LAB
ABO GROUP BLD BPU: NORMAL
ANION GAP SERPL CALCULATED.3IONS-SCNC: 6 MMOL/L (ref 4–13)
ATRIAL RATE: 149 BPM
BASO STIPL BLD QL SMEAR: PRESENT
BASOPHILS # BLD MANUAL: 0.11 THOUSAND/UL (ref 0–0.1)
BASOPHILS NFR MAR MANUAL: 1 % (ref 0–1)
BPU ID: NORMAL
BUN SERPL-MCNC: 69 MG/DL (ref 6–20)
CALCIUM SERPL-MCNC: 8.7 MG/DL (ref 8.4–10.2)
CHLORIDE SERPL-SCNC: 104 MMOL/L (ref 96–108)
CO2 SERPL-SCNC: 29 MMOL/L (ref 22–33)
CREAT SERPL-MCNC: 2.03 MG/DL (ref 0.4–1.1)
CROSSMATCH: NORMAL
CROSSMATCH: NORMAL
EOSINOPHIL # BLD MANUAL: 0.11 THOUSAND/UL (ref 0–0.4)
EOSINOPHIL NFR BLD MANUAL: 1 % (ref 0–6)
ERYTHROCYTE [DISTWIDTH] IN BLOOD BY AUTOMATED COUNT: 18.2 % (ref 11.6–15.1)
ERYTHROCYTE [DISTWIDTH] IN BLOOD BY AUTOMATED COUNT: 18.6 % (ref 11.6–15.1)
GFR SERPL CREATININE-BSD FRML MDRD: 26 ML/MIN/1.73SQ M
GLUCOSE SERPL-MCNC: 123 MG/DL (ref 65–140)
GLUCOSE SERPL-MCNC: 124 MG/DL (ref 65–140)
GLUCOSE SERPL-MCNC: 135 MG/DL (ref 65–140)
GLUCOSE SERPL-MCNC: 137 MG/DL (ref 65–140)
GLUCOSE SERPL-MCNC: 139 MG/DL (ref 65–140)
HCT VFR BLD AUTO: 23.2 % (ref 34.8–46.1)
HCT VFR BLD AUTO: 25.2 % (ref 34.8–46.1)
HEMOCCULT STL QL IA: NEGATIVE
HGB BLD-MCNC: 7.3 G/DL (ref 11.5–15.4)
HGB BLD-MCNC: 7.9 G/DL (ref 11.5–15.4)
INR PPP: 1.83 (ref 0.9–1.1)
LYMPHOCYTES # BLD AUTO: 2.29 THOUSAND/UL (ref 0.6–4.47)
LYMPHOCYTES # BLD AUTO: 20 % (ref 14–44)
MCH RBC QN AUTO: 30.9 PG (ref 26.8–34.3)
MCH RBC QN AUTO: 31 PG (ref 26.8–34.3)
MCHC RBC AUTO-ENTMCNC: 31.3 G/DL (ref 31.4–37.4)
MCHC RBC AUTO-ENTMCNC: 31.5 G/DL (ref 31.4–37.4)
MCV RBC AUTO: 98 FL (ref 82–98)
MCV RBC AUTO: 99 FL (ref 82–98)
MONOCYTES # BLD AUTO: 0.69 THOUSAND/UL (ref 0–1.22)
MONOCYTES NFR BLD: 6 % (ref 4–12)
NEUTROPHILS # BLD MANUAL: 8.24 THOUSAND/UL (ref 1.85–7.62)
NEUTS SEG NFR BLD AUTO: 72 % (ref 43–75)
PLATELET # BLD AUTO: 260 THOUSANDS/UL (ref 149–390)
PLATELET # BLD AUTO: 271 THOUSANDS/UL (ref 149–390)
PLATELET BLD QL SMEAR: ADEQUATE
PMV BLD AUTO: 10 FL (ref 8.9–12.7)
PMV BLD AUTO: 9.8 FL (ref 8.9–12.7)
POLYCHROMASIA BLD QL SMEAR: PRESENT
POTASSIUM SERPL-SCNC: 4.3 MMOL/L (ref 3.5–5)
PR INTERVAL: 101 MS
PROTHROMBIN TIME: 20.1 SECONDS (ref 9.5–12.1)
QRS AXIS: 52 DEGREES
QRSD INTERVAL: 75 MS
QT INTERVAL: 337 MS
QTC INTERVAL: 507 MS
RBC # BLD AUTO: 2.36 MILLION/UL (ref 3.81–5.12)
RBC # BLD AUTO: 2.55 MILLION/UL (ref 3.81–5.12)
RBC MORPH BLD: PRESENT
SODIUM SERPL-SCNC: 139 MMOL/L (ref 133–145)
T WAVE AXIS: 62 DEGREES
TOTAL CELLS COUNTED SPEC: 100
UNIT DISPENSE STATUS: NORMAL
UNIT PRODUCT CODE: NORMAL
UNIT PRODUCT VOLUME: 296 ML
UNIT PRODUCT VOLUME: 350 ML
UNIT PRODUCT VOLUME: 350 ML
UNIT RH: NORMAL
VENTRICULAR RATE: 136 BPM
WBC # BLD AUTO: 10.14 THOUSAND/UL (ref 4.31–10.16)
WBC # BLD AUTO: 11.44 THOUSAND/UL (ref 4.31–10.16)

## 2021-08-09 PROCEDURE — 88342 IMHCHEM/IMCYTCHM 1ST ANTB: CPT | Performed by: PATHOLOGY

## 2021-08-09 PROCEDURE — 82948 REAGENT STRIP/BLOOD GLUCOSE: CPT

## 2021-08-09 PROCEDURE — C9113 INJ PANTOPRAZOLE SODIUM, VIA: HCPCS | Performed by: INTERNAL MEDICINE

## 2021-08-09 PROCEDURE — 88313 SPECIAL STAINS GROUP 2: CPT | Performed by: PATHOLOGY

## 2021-08-09 PROCEDURE — 0DB68ZX EXCISION OF STOMACH, VIA NATURAL OR ARTIFICIAL OPENING ENDOSCOPIC, DIAGNOSTIC: ICD-10-PCS | Performed by: INTERNAL MEDICINE

## 2021-08-09 PROCEDURE — 93010 ELECTROCARDIOGRAM REPORT: CPT | Performed by: INTERNAL MEDICINE

## 2021-08-09 PROCEDURE — 85007 BL SMEAR W/DIFF WBC COUNT: CPT | Performed by: INTERNAL MEDICINE

## 2021-08-09 PROCEDURE — 99233 SBSQ HOSP IP/OBS HIGH 50: CPT | Performed by: INTERNAL MEDICINE

## 2021-08-09 PROCEDURE — 85027 COMPLETE CBC AUTOMATED: CPT | Performed by: INTERNAL MEDICINE

## 2021-08-09 PROCEDURE — 85610 PROTHROMBIN TIME: CPT | Performed by: PHYSICIAN ASSISTANT

## 2021-08-09 PROCEDURE — 88305 TISSUE EXAM BY PATHOLOGIST: CPT | Performed by: PATHOLOGY

## 2021-08-09 PROCEDURE — 80048 BASIC METABOLIC PNL TOTAL CA: CPT | Performed by: INTERNAL MEDICINE

## 2021-08-09 PROCEDURE — G0328 FECAL BLOOD SCRN IMMUNOASSAY: HCPCS | Performed by: INTERNAL MEDICINE

## 2021-08-09 PROCEDURE — 43239 EGD BIOPSY SINGLE/MULTIPLE: CPT | Performed by: INTERNAL MEDICINE

## 2021-08-09 RX ORDER — SODIUM CHLORIDE, SODIUM LACTATE, POTASSIUM CHLORIDE, CALCIUM CHLORIDE 600; 310; 30; 20 MG/100ML; MG/100ML; MG/100ML; MG/100ML
75 INJECTION, SOLUTION INTRAVENOUS CONTINUOUS
Status: DISCONTINUED | OUTPATIENT
Start: 2021-08-09 | End: 2021-08-11

## 2021-08-09 RX ORDER — LIDOCAINE HYDROCHLORIDE 10 MG/ML
INJECTION, SOLUTION EPIDURAL; INFILTRATION; INTRACAUDAL; PERINEURAL AS NEEDED
Status: DISCONTINUED | OUTPATIENT
Start: 2021-08-09 | End: 2021-08-09

## 2021-08-09 RX ORDER — SODIUM CHLORIDE, SODIUM LACTATE, POTASSIUM CHLORIDE, CALCIUM CHLORIDE 600; 310; 30; 20 MG/100ML; MG/100ML; MG/100ML; MG/100ML
INJECTION, SOLUTION INTRAVENOUS CONTINUOUS PRN
Status: DISCONTINUED | OUTPATIENT
Start: 2021-08-09 | End: 2021-08-09

## 2021-08-09 RX ORDER — ATORVASTATIN CALCIUM 10 MG/1
10 TABLET, FILM COATED ORAL
Status: DISCONTINUED | OUTPATIENT
Start: 2021-08-09 | End: 2021-08-12 | Stop reason: HOSPADM

## 2021-08-09 RX ORDER — PROPOFOL 10 MG/ML
INJECTION, EMULSION INTRAVENOUS AS NEEDED
Status: DISCONTINUED | OUTPATIENT
Start: 2021-08-09 | End: 2021-08-09

## 2021-08-09 RX ADMIN — PANTOPRAZOLE SODIUM 40 MG: 40 INJECTION, POWDER, FOR SOLUTION INTRAVENOUS at 13:48

## 2021-08-09 RX ADMIN — SODIUM CHLORIDE, SODIUM LACTATE, POTASSIUM CHLORIDE, AND CALCIUM CHLORIDE 125 ML/HR: .6; .31; .03; .02 INJECTION, SOLUTION INTRAVENOUS at 09:50

## 2021-08-09 RX ADMIN — CEFTRIAXONE 1000 MG: 1 INJECTION, SOLUTION INTRAVENOUS at 13:47

## 2021-08-09 RX ADMIN — PROPOFOL 50 MG: 10 INJECTION, EMULSION INTRAVENOUS at 11:23

## 2021-08-09 RX ADMIN — DULOXETINE HYDROCHLORIDE 30 MG: 30 CAPSULE, DELAYED RELEASE ORAL at 13:48

## 2021-08-09 RX ADMIN — SODIUM CHLORIDE, SODIUM LACTATE, POTASSIUM CHLORIDE, AND CALCIUM CHLORIDE: .6; .31; .03; .02 INJECTION, SOLUTION INTRAVENOUS at 10:27

## 2021-08-09 RX ADMIN — LEVOTHYROXINE SODIUM 100 MCG: 150 TABLET ORAL at 05:13

## 2021-08-09 RX ADMIN — FERROUS SULFATE TAB 325 MG (65 MG ELEMENTAL FE) 325 MG: 325 (65 FE) TAB at 13:48

## 2021-08-09 RX ADMIN — PANTOPRAZOLE SODIUM 40 MG: 40 INJECTION, POWDER, FOR SOLUTION INTRAVENOUS at 21:25

## 2021-08-09 RX ADMIN — CYANOCOBALAMIN TAB 500 MCG 500 MCG: 500 TAB at 13:48

## 2021-08-09 RX ADMIN — DULOXETINE HYDROCHLORIDE 30 MG: 30 CAPSULE, DELAYED RELEASE ORAL at 18:32

## 2021-08-09 RX ADMIN — ATORVASTATIN CALCIUM 10 MG: 10 TABLET, FILM COATED ORAL at 18:32

## 2021-08-09 RX ADMIN — PROPOFOL 150 MG: 10 INJECTION, EMULSION INTRAVENOUS at 11:13

## 2021-08-09 RX ADMIN — LIDOCAINE HYDROCHLORIDE 50 MG: 10 INJECTION, SOLUTION EPIDURAL; INFILTRATION; INTRACAUDAL; PERINEURAL at 11:13

## 2021-08-09 RX ADMIN — PROPOFOL 50 MG: 10 INJECTION, EMULSION INTRAVENOUS at 11:19

## 2021-08-09 RX ADMIN — LORAZEPAM 0.5 MG: 0.5 TABLET ORAL at 21:25

## 2021-08-09 RX ADMIN — CHOLECALCIFEROL TAB 10 MCG (400 UNIT) 400 UNITS: 10 TAB at 13:48

## 2021-08-09 NOTE — INTERVAL H&P NOTE
H&P reviewed  After examining the patient I find no changes in the patients condition since the H&P had been written      Vitals:    08/09/21 1021   BP: 108/56   Pulse:    Resp:    Temp:    SpO2:

## 2021-08-09 NOTE — PLAN OF CARE
Problem: Potential for Falls  Goal: Patient will remain free of falls  Description: INTERVENTIONS:  - Educate patient/family on patient safety including physical limitations  - Instruct patient to call for assistance with activity   - Consult OT/PT to assist with strengthening/mobility   - Keep Call bell within reach  - Keep bed low and locked with side rails adjusted as appropriate  - Keep care items and personal belongings within reach  - Initiate and maintain comfort rounds  - Make Fall Risk Sign visible to staff  - Offer Toileting every  Hours, in advance of need  - Initiate/Maintain alarm  - Obtain necessary fall risk management equipment:   - Apply yellow socks and bracelet for high fall risk patients  - Consider moving patient to room near nurses station  Outcome: Progressing     Problem: MOBILITY - ADULT  Goal: Maintain or return to baseline ADL function  Description: INTERVENTIONS:  -  Assess patient's ability to carry out ADLs; assess patient's baseline for ADL function and identify physical deficits which impact ability to perform ADLs (bathing, care of mouth/teeth, toileting, grooming, dressing, etc )  - Assess/evaluate cause of self-care deficits   - Assess range of motion  - Assess patient's mobility; develop plan if impaired  - Assess patient's need for assistive devices and provide as appropriate  - Encourage maximum independence but intervene and supervise when necessary  - Involve family in performance of ADLs  - Assess for home care needs following discharge   - Consider OT consult to assist with ADL evaluation and planning for discharge  - Provide patient education as appropriate  Outcome: Progressing  Goal: Maintains/Returns to pre admission functional level  Description: INTERVENTIONS:  - Perform BMAT or MOVE assessment daily    - Set and communicate daily mobility goal to care team and patient/family/caregiver     - Collaborate with rehabilitation services on mobility goals if consulted  - Perform Range of Motion  times a day  - Reposition patient every  hours    - Dangle patient  times a day  - Stand patient  times a day  - Ambulate patient  times a day  - Out of bed to chair  times a day   - Out of bed for meals  times a day  - Out of bed for toileting  - Record patient progress and toleration of activity level   Outcome: Progressing     Problem: PAIN - ADULT  Goal: Verbalizes/displays adequate comfort level or baseline comfort level  Description: Interventions:  - Encourage patient to monitor pain and request assistance  - Assess pain using appropriate pain scale  - Administer analgesics based on type and severity of pain and evaluate response  - Implement non-pharmacological measures as appropriate and evaluate response  - Consider cultural and social influences on pain and pain management  - Notify physician/advanced practitioner if interventions unsuccessful or patient reports new pain  Outcome: Progressing     Problem: INFECTION - ADULT  Goal: Absence or prevention of progression during hospitalization  Description: INTERVENTIONS:  - Assess and monitor for signs and symptoms of infection  - Monitor lab/diagnostic results  - Monitor all insertion sites, i e  indwelling lines, tubes, and drains  - Monitor endotracheal if appropriate and nasal secretions for changes in amount and color  - [place] appropriate cooling/warming therapies per order  - Administer medications as ordered  - Instruct and encourage patient and family to use good hand hygiene technique  - Identify and instruct in appropriate isolation precautions for identified infection/condition  Outcome: Progressing  Goal: Absence of fever/infection during neutropenic period  Description: INTERVENTIONS:  - Monitor WBC    Outcome: Progressing     Problem: SAFETY ADULT  Goal: Patient will remain free of falls  Description: INTERVENTIONS:  - Educate patient/family on patient safety including physical limitations  - Instruct patient to call for assistance with activity   - Consult OT/PT to assist with strengthening/mobility   - Keep Call bell within reach  - Keep bed low and locked with side rails adjusted as appropriate  - Keep care items and personal belongings within reach  - Initiate and maintain comfort rounds  - Make Fall Risk Sign visible to staff  - Offer Toileting every  Hours, in advance of need  - Initiate/Maintain alarm  - Obtain necessary fall risk management equipment:   - Apply yellow socks and bracelet for high fall risk patients  - Consider moving patient to room near nurses station  Outcome: Progressing  Goal: Maintain or return to baseline ADL function  Description: INTERVENTIONS:  -  Assess patient's ability to carry out ADLs; assess patient's baseline for ADL function and identify physical deficits which impact ability to perform ADLs (bathing, care of mouth/teeth, toileting, grooming, dressing, etc )  - Assess/evaluate cause of self-care deficits   - Assess range of motion  - Assess patient's mobility; develop plan if impaired  - Assess patient's need for assistive devices and provide as appropriate  - Encourage maximum independence but intervene and supervise when necessary  - Involve family in performance of ADLs  - Assess for home care needs following discharge   - Consider OT consult to assist with ADL evaluation and planning for discharge  - Provide patient education as appropriate  Outcome: Progressing  Goal: Maintains/Returns to pre admission functional level  Description: INTERVENTIONS:  - Perform BMAT or MOVE assessment daily    - Set and communicate daily mobility goal to care team and patient/family/caregiver  - Collaborate with rehabilitation services on mobility goals if consulted  - Perform Range of Motion  times a day  - Reposition patient every  hours    - Dangle patient  times a day  - Stand patient  times a day  - Ambulate patient  times a day  - Out of bed to chair  times a day   - Out of bed for meals times a day  - Out of bed for toileting  - Record patient progress and toleration of activity level   Outcome: Progressing     Problem: DISCHARGE PLANNING  Goal: Discharge to home or other facility with appropriate resources  Description: INTERVENTIONS:  - Identify barriers to discharge w/patient and caregiver  - Arrange for needed discharge resources and transportation as appropriate  - Identify discharge learning needs (meds, wound care, etc )  - Arrange for interpretive services to assist at discharge as needed  - Refer to Case Management Department for coordinating discharge planning if the patient needs post-hospital services based on physician/advanced practitioner order or complex needs related to functional status, cognitive ability, or social support system  Outcome: Progressing     Problem: Knowledge Deficit  Goal: Patient/family/caregiver demonstrates understanding of disease process, treatment plan, medications, and discharge instructions  Description: Complete learning assessment and assess knowledge base    Interventions:  - Provide teaching at level of understanding  - Provide teaching via preferred learning methods  Outcome: Progressing     Problem: Prexisting or High Potential for Compromised Skin Integrity  Goal: Skin integrity is maintained or improved  Description: INTERVENTIONS:  - Identify patients at risk for skin breakdown  - Assess and monitor skin integrity  - Assess and monitor nutrition and hydration status  - Monitor labs   - Assess for incontinence   - Turn and reposition patient  - Assist with mobility/ambulation  - Relieve pressure over bony prominences  - Avoid friction and shearing  - Provide appropriate hygiene as needed including keeping skin clean and dry  - Evaluate need for skin moisturizer/barrier cream  - Collaborate with interdisciplinary team   - Patient/family teaching  - Consider wound care consult   Outcome: Progressing

## 2021-08-09 NOTE — ASSESSMENT & PLAN NOTE
Lab Results   Component Value Date    HGBA1C 6 3 03/23/2021       Recent Labs     08/08/21  1147 08/08/21  2125 08/09/21  0650 08/09/21  0932   POCGLU 167* 146* 135 137       Blood Sugar Average: Last 72 hrs:  (P) 894 [id number] will give sliding scale coverage and monitor blood glucose

## 2021-08-09 NOTE — ASSESSMENT & PLAN NOTE
Lab Results   Component Value Date    EGFR 26 08/09/2021    EGFR 28 08/08/2021    EGFR 31 08/07/2021    CREATININE 2 03 (H) 08/09/2021    CREATININE 1 88 (H) 08/08/2021    CREATININE 1 74 (H) 08/07/2021   Monitor hemoglobin-patient has no active GI bleed  INR is supratherapeutic and will continue to hold Coumadin  Microcytic anemia-Hemoglobin dropped to 6 4 today and has no active GI bleed  GI evaluation was obtained and recommend EGD  Will continue to hold Coumadin and give PRBC transfusion and FFP and monitor hemoglobin  Will give PPI IV  Will check vitamin B12 level

## 2021-08-09 NOTE — ASSESSMENT & PLAN NOTE
Unclear etiology-patient has diffuse abdominal pain CT of the abdomen without contrast shows no acute abnormality in the abdomen or pelvis  Chronic large ventral hernia containing several loops of small intestine and the portion of cecum without evidence of incarceration noted  Patient has UTI and will give IV antibiotic with Zosyn  Lipase within normal limit lactic acid is 2  Consider General surgery input if no improvement  Abdominal pain has resolved  Patient noted to have severe anemia-no evidence of active GI bleed-will receive PRBC transfusion and FFP as and INR is elevated and also give vitamin K  Patient evaluated by gastroenterology and is scheduled for EGD in [facility] Patient underwent EGD today which showed   Patient may have bled from supratherapeutic INR  And significant anemia noted and received 2 units of PRBC and FFP and INR is less than 2  Hemoglobin is above 7  Will monitor for further bleeding if continues to drop hemoglobin may need colonoscopy

## 2021-08-09 NOTE — PROGRESS NOTES
Christa U  65   Progress Note - Render Beans 1960, 57 y o  female MRN: [id number]  Unit/Bed#: MS [facility]-Oral Encounter: [id number]  Primary Care Provider: Linsey Mckeon MD   Date and time admitted to hospital: 8/7/2021  1:18 PM    * Abdominal pain  Assessment & Plan  Unclear etiology-patient has diffuse abdominal pain CT of the abdomen without contrast shows no acute abnormality in the abdomen or pelvis  Chronic large ventral hernia containing several loops of small intestine and the portion of cecum without evidence of incarceration noted  Patient has UTI and will give IV antibiotic with Zosyn  Lipase within normal limit lactic acid is 2  Consider General surgery input if no improvement  Abdominal pain has resolved  Patient noted to have severe anemia-no evidence of active GI bleed-will receive PRBC transfusion and FFP as and INR is elevated and also give vitamin K  Patient evaluated by gastroenterology and is scheduled for EGD in a [facility] Patient underwent EGD today which showed   Patient may have bled from supratherapeutic INR  And significant anemia noted and received 2 units of PRBC and FFP and INR is less than 2  Hemoglobin is above 7  Will monitor for further bleeding if continues to drop hemoglobin may need colonoscopy  Diabetes mellitus [facility])  Assessment & Plan  Lab Results   Component Value Date    HGBA1C 6 3 03/23/2021       Recent Labs     08/08/21  1147 08/08/21  2125 08/09/21  0650 08/09/21  0932   POCGLU 167* 146* 135 137       Blood Sugar Average: Last 72 hrs:  (P) 963 [id number] will give sliding scale coverage and monitor blood glucose    Hypothyroid  Assessment & Plan  Continue levothyroxine    COPD with asthma ([facility] [address] )  Assessment & Plan  Stable-will resume nebulizer p r n  Paroxysmal atrial fibrillation (HCC)  Assessment & Plan  AFib with RVR-heart rate is in 120s to 130s-patient received 5 mg of metoprolol IV, will give p o  Metoprolol 25 b i d  And monitor  Potassium is 5 9 and will correct electrolytes  Will place on cardiac monitor  INR supratherapeutic and will hold Coumadin  Consider to consult Cardiology continues to have uncontrolled heart rate  Anemia due to stage 3b chronic kidney disease [facility])  Assessment & Plan  Lab Results   Component Value Date    EGFR 26 08/09/2021    EGFR 28 08/08/2021    EGFR 31 08/07/2021    CREATININE 2 03 (H) 08/09/2021    CREATININE 1 88 (H) 08/08/2021    CREATININE 1 74 (H) 08/07/2021   Monitor hemoglobin-patient has no active GI bleed  INR is supratherapeutic and will continue to hold Coumadin  Microcytic anemia-Hemoglobin dropped to 6 4 today and has no active GI bleed  GI evaluation was obtained and recommend EGD  Will continue to hold Coumadin and give PRBC transfusion and FFP and monitor hemoglobin  Will give PPI IV  Will check vitamin B12 level  Chronic respiratory failure with hypoxia, POA, due to COPD evidenced by chronic oxygen use treated with continuation of 3LNC (baseline)    Sepsis, POA, likely due to UTI evidenced by Wbc 16 52; Tachycardia () treated with IV Zosyn , now IV Rocephin, 1L NSS bolus, IVF , blood culture and lactic acid      Subjective/Objective     Subjective:   Patient feels better no abdominal pain noted however has black watery stools  Patient underwent EGD today  No nausea or vomiting noted  Objective:  Vitals: Blood pressure 112/72, pulse (!) 110, temperature 98 °F (36 7 °C), temperature source Tympanic, resp  rate 19, height 5' 4" (1 626 m), weight 136 kg (300 lb), SpO2 100 %, not currently breastfeeding  ,Body mass index is 51 49 kg/m²        Intake/Output Summary (Last 24 hours) at 8/9/2021 1541  Last data filed at 8/9/2021 1134  Gross per 24 hour   Intake 1296 ml   Output 1400 ml   Net -104 ml       Invasive Devices     Peripheral Intravenous Line            Peripheral IV 08/07/21 Left Antecubital 2 days                Physical Exam:   HEENT-PERRLA, moist oral mucosa  Neck-supple, no JVD elevation   Respiratory-equal air entry bilaterally, no rales or rhonchi  Cardiovascular system-S1, S2 heard, no murmur or gallops or rubs  Abdomen-soft, nontender, no guarding or rigidity, bowel sounds heard  Extremities-no pedal edema  Peripheral pulses palpable  Musculoskeletal-no contractures  Central nervous system-no acute focal neurological deficit ,no sensory or motor deficit noted  Skin-no rash noted          Lab, Imaging and other studies: I have personally reviewed pertinent reports      VTE Pharmacologic Prophylaxis: Warfarin (Coumadin)  VTE Mechanical Prophylaxis: sequential compression device

## 2021-08-09 NOTE — NURSING NOTE
Patient placed on bed pan per request upon arrival to [address] [place] [address]  Once finished, stool noted by RN to be black, sticky, foul smelling, and liquid  Dr Karishma Vu made aware  Repeat PT INR taken, Dr Nell fleming texted by RN and gave verbal order for repeat CBC, ordered and sent  Pt resting on stretcher, verbalized understanding of situation and agreeable  Will continue to monitor for changes, will anticipate lab results

## 2021-08-09 NOTE — ANESTHESIA PREPROCEDURE EVALUATION
Procedure:  EGD    Relevant Problems   CARDIO   (+) Paroxysmal atrial fibrillation (HCC)      ENDO   (+) Hyperparathyroidism (HCC)   (+) Hypothyroid   (+) Secondary hyperparathyroidism of renal origin (HCC)      GI/HEPATIC   (+) GERD (gastroesophageal reflux disease)      /RENAL   (+) Acute renal failure superimposed on chronic kidney disease (HCC)   (+) Anemia due to stage 3b chronic kidney disease (HCC)   (+) Chronic kidney disease, stage III (moderate) (HCC)   (+) Hypertensive chronic kidney disease with stage 1 through stage 4 chronic kidney disease, or unspecified chronic kidney disease      HEMATOLOGY   (+) Anemia due to stage 3b chronic kidney disease (HCC)      NEURO/PSYCH   (+) Anxiety      PULMONARY   (+) COPD with asthma (HCC)   (+) SOB (shortness of breath)        Physical Exam    Airway    Mallampati score: III  TM Distance: >3 FB  Neck ROM: full     Dental   upper dentures,     Cardiovascular  Rhythm: irregular, Rate: normal, Cardiovascular exam normal    Pulmonary  Pulmonary exam normal Breath sounds clear to auscultation,     Other Findings  SLEEP APNE  Home O2 at 3 L 24/7      Anesthesia Plan  ASA Score- 3     Anesthesia Type- IV sedation with anesthesia with ASA Monitors  Additional Monitors:   Airway Plan:           Plan Factors-Exercise tolerance (METS): >4 METS  Chart reviewed  EKG reviewed  Existing labs reviewed  Patient is not a current smoker  Patient instructed to abstain from smoking on day of procedure  Patient did not smoke on day of surgery  There is medical exclusion for perioperative obstructive sleep apnea risk education  Induction- intravenous  Postoperative Plan-     Informed Consent- Anesthetic plan and risks discussed with patient  I personally reviewed this patient with the CRNA  Discussed and agreed on the Anesthesia Plan with the CRNA  Merced Thomas

## 2021-08-09 NOTE — ANESTHESIA POSTPROCEDURE EVALUATION
Post-Op Assessment Note    CV Status:  Stable  Pain Score: 0    Pain management: adequate     Mental Status:  Alert and awake   Hydration Status:  Euvolemic   PONV Controlled:  Controlled   Airway Patency:  Patent      Post Op Vitals Reviewed: Yes      Staff: CRNA, Anesthesiologist   Comments: vss        No complications documented      BP      Temp      Pulse    Resp      SpO2

## 2021-08-10 LAB
ANION GAP SERPL CALCULATED.3IONS-SCNC: 5 MMOL/L (ref 4–13)
BUN SERPL-MCNC: 47 MG/DL (ref 6–20)
CALCIUM SERPL-MCNC: 8.8 MG/DL (ref 8.4–10.2)
CHLORIDE SERPL-SCNC: 105 MMOL/L (ref 96–108)
CO2 SERPL-SCNC: 32 MMOL/L (ref 22–33)
CREAT SERPL-MCNC: 1.77 MG/DL (ref 0.4–1.1)
ERYTHROCYTE [DISTWIDTH] IN BLOOD BY AUTOMATED COUNT: 18.5 % (ref 11.6–15.1)
GFR SERPL CREATININE-BSD FRML MDRD: 30 ML/MIN/1.73SQ M
GLUCOSE SERPL-MCNC: 107 MG/DL (ref 65–140)
GLUCOSE SERPL-MCNC: 117 MG/DL (ref 65–140)
GLUCOSE SERPL-MCNC: 131 MG/DL (ref 65–140)
GLUCOSE SERPL-MCNC: 139 MG/DL (ref 65–140)
GLUCOSE SERPL-MCNC: 201 MG/DL (ref 65–140)
HCT VFR BLD AUTO: 20.8 % (ref 34.8–46.1)
HGB BLD-MCNC: 6.5 G/DL (ref 11.5–15.4)
INR PPP: 1.11 (ref 0.9–1.1)
INR PPP: 1.26 (ref 0.9–1.1)
MCH RBC QN AUTO: 31.4 PG (ref 26.8–34.3)
MCHC RBC AUTO-ENTMCNC: 31.3 G/DL (ref 31.4–37.4)
MCV RBC AUTO: 101 FL (ref 82–98)
PLATELET # BLD AUTO: 248 THOUSANDS/UL (ref 149–390)
PMV BLD AUTO: 9.7 FL (ref 8.9–12.7)
POTASSIUM SERPL-SCNC: 3.9 MMOL/L (ref 3.5–5)
PROTHROMBIN TIME: 12.5 SECONDS (ref 9.5–12.1)
PROTHROMBIN TIME: 14.1 SECONDS (ref 9.5–12.1)
RBC # BLD AUTO: 2.07 MILLION/UL (ref 3.81–5.12)
SODIUM SERPL-SCNC: 142 MMOL/L (ref 133–145)
WBC # BLD AUTO: 8.62 THOUSAND/UL (ref 4.31–10.16)

## 2021-08-10 PROCEDURE — 99233 SBSQ HOSP IP/OBS HIGH 50: CPT | Performed by: INTERNAL MEDICINE

## 2021-08-10 PROCEDURE — 82948 REAGENT STRIP/BLOOD GLUCOSE: CPT

## 2021-08-10 PROCEDURE — 85027 COMPLETE CBC AUTOMATED: CPT | Performed by: INTERNAL MEDICINE

## 2021-08-10 PROCEDURE — C9113 INJ PANTOPRAZOLE SODIUM, VIA: HCPCS | Performed by: INTERNAL MEDICINE

## 2021-08-10 PROCEDURE — 30233N1 TRANSFUSION OF NONAUTOLOGOUS RED BLOOD CELLS INTO PERIPHERAL VEIN, PERCUTANEOUS APPROACH: ICD-10-PCS | Performed by: INTERNAL MEDICINE

## 2021-08-10 PROCEDURE — 85610 PROTHROMBIN TIME: CPT | Performed by: INTERNAL MEDICINE

## 2021-08-10 PROCEDURE — 99232 SBSQ HOSP IP/OBS MODERATE 35: CPT | Performed by: INTERNAL MEDICINE

## 2021-08-10 PROCEDURE — 80048 BASIC METABOLIC PNL TOTAL CA: CPT | Performed by: INTERNAL MEDICINE

## 2021-08-10 PROCEDURE — P9016 RBC LEUKOCYTES REDUCED: HCPCS

## 2021-08-10 RX ORDER — POLYETHYLENE GLYCOL 3350, SODIUM CHLORIDE, SODIUM BICARBONATE, POTASSIUM CHLORIDE 420; 11.2; 5.72; 1.48 G/4L; G/4L; G/4L; G/4L
4000 POWDER, FOR SOLUTION ORAL SEE ADMIN INSTRUCTIONS
Status: DISCONTINUED | OUTPATIENT
Start: 2021-08-10 | End: 2021-08-12 | Stop reason: HOSPADM

## 2021-08-10 RX ADMIN — FERROUS SULFATE TAB 325 MG (65 MG ELEMENTAL FE) 325 MG: 325 (65 FE) TAB at 09:06

## 2021-08-10 RX ADMIN — PANTOPRAZOLE SODIUM 40 MG: 40 INJECTION, POWDER, FOR SOLUTION INTRAVENOUS at 20:11

## 2021-08-10 RX ADMIN — ATORVASTATIN CALCIUM 10 MG: 10 TABLET, FILM COATED ORAL at 17:31

## 2021-08-10 RX ADMIN — CYANOCOBALAMIN TAB 500 MCG 500 MCG: 500 TAB at 09:05

## 2021-08-10 RX ADMIN — CEFTRIAXONE 1000 MG: 1 INJECTION, SOLUTION INTRAVENOUS at 09:07

## 2021-08-10 RX ADMIN — METOPROLOL TARTRATE 25 MG: 25 TABLET, FILM COATED ORAL at 20:11

## 2021-08-10 RX ADMIN — CHOLECALCIFEROL TAB 10 MCG (400 UNIT) 400 UNITS: 10 TAB at 09:05

## 2021-08-10 RX ADMIN — DULOXETINE HYDROCHLORIDE 30 MG: 30 CAPSULE, DELAYED RELEASE ORAL at 17:31

## 2021-08-10 RX ADMIN — PANTOPRAZOLE SODIUM 40 MG: 40 INJECTION, POWDER, FOR SOLUTION INTRAVENOUS at 09:04

## 2021-08-10 RX ADMIN — LEVOTHYROXINE SODIUM 100 MCG: 150 TABLET ORAL at 06:47

## 2021-08-10 RX ADMIN — BISACODYL 20 MG: 5 TABLET, COATED ORAL at 20:11

## 2021-08-10 RX ADMIN — DULOXETINE HYDROCHLORIDE 30 MG: 30 CAPSULE, DELAYED RELEASE ORAL at 09:06

## 2021-08-10 NOTE — PLAN OF CARE
Problem: MOBILITY - ADULT  Goal: Maintain or return to baseline ADL function  Description: INTERVENTIONS:  -  Assess patient's ability to carry out ADLs; assess patient's baseline for ADL function and identify physical deficits which impact ability to perform ADLs (bathing, care of mouth/teeth, toileting, grooming, dressing, etc )  - Assess/evaluate cause of self-care deficits   - Assess range of motion  - Assess patient's mobility; develop plan if impaired  - Assess patient's need for assistive devices and provide as appropriate  - Encourage maximum independence but intervene and supervise when necessary  - Involve family in performance of ADLs  - Assess for home care needs following discharge   - Consider OT consult to assist with ADL evaluation and planning for discharge  - Provide patient education as appropriate  Outcome: Progressing  Goal: Maintains/Returns to pre admission functional level  Description: INTERVENTIONS:  - Perform BMAT or MOVE assessment daily    - Set and communicate daily mobility goal to care team and patient/family/caregiver  - Collaborate with rehabilitation services on mobility goals if consulted  - Reposition patient every 2 hours    - Stand patient 3 times a day  - Ambulate patient 3 times a day  - Out of bed to chair 3 times a day   - Out of bed for meals 3 times a day  - Out of bed for toileting  - Record patient progress and toleration of activity level   Outcome: Progressing     Problem: PAIN - ADULT  Goal: Verbalizes/displays adequate comfort level or baseline comfort level  Description: Interventions:  - Encourage patient to monitor pain and request assistance  - Assess pain using appropriate pain scale  - Administer analgesics based on type and severity of pain and evaluate response  - Implement non-pharmacological measures as appropriate and evaluate response  - Consider cultural and social influences on pain and pain management  - Notify physician/advanced practitioner if interventions unsuccessful or patient reports new pain  Outcome: Progressing     Problem: INFECTION - ADULT  Goal: Absence or prevention of progression during hospitalization  Description: INTERVENTIONS:  - Assess and monitor for signs and symptoms of infection  - Monitor lab/diagnostic results  - Monitor all insertion sites, i e  indwelling lines, tubes, and drains  - Monitor endotracheal if appropriate and nasal secretions for changes in amount and color  - [place] appropriate cooling/warming therapies per order  - Administer medications as ordered  - Instruct and encourage patient and family to use good hand hygiene technique  - Identify and instruct in appropriate isolation precautions for identified infection/condition  Outcome: Progressing     Problem: SAFETY ADULT  Goal: Maintain or return to baseline ADL function  Description: INTERVENTIONS:  -  Assess patient's ability to carry out ADLs; assess patient's baseline for ADL function and identify physical deficits which impact ability to perform ADLs (bathing, care of mouth/teeth, toileting, grooming, dressing, etc )  - Assess/evaluate cause of self-care deficits   - Assess range of motion  - Assess patient's mobility; develop plan if impaired  - Assess patient's need for assistive devices and provide as appropriate  - Encourage maximum independence but intervene and supervise when necessary  - Involve family in performance of ADLs  - Assess for home care needs following discharge   - Consider OT consult to assist with ADL evaluation and planning for discharge  - Provide patient education as appropriate  Outcome: Progressing  Goal: Maintains/Returns to pre admission functional level  Description: INTERVENTIONS:  - Perform BMAT or MOVE assessment daily    - Set and communicate daily mobility goal to care team and patient/family/caregiver  - Collaborate with rehabilitation services on mobility goals if consulted  - Reposition patient every 2 hours    - Stand patient 3 times a day  - Ambulate patient 3 times a day  - Out of bed to chair 3 times a day   - Out of bed for meals 3 times a day  - Out of bed for toileting  - Record patient progress and toleration of activity level   Outcome: Progressing     Problem: DISCHARGE PLANNING  Goal: Discharge to home or other facility with appropriate resources  Description: INTERVENTIONS:  - Identify barriers to discharge w/patient and caregiver  - Arrange for needed discharge resources and transportation as appropriate  - Identify discharge learning needs (meds, wound care, etc )  - Arrange for interpretive services to assist at discharge as needed  - Refer to Case Management Department for coordinating discharge planning if the patient needs post-hospital services based on physician/advanced practitioner order or complex needs related to functional status, cognitive ability, or social support system  Outcome: Progressing     Problem: Knowledge Deficit  Goal: Patient/family/caregiver demonstrates understanding of disease process, treatment plan, medications, and discharge instructions  Description: Complete learning assessment and assess knowledge base    Interventions:  - Provide teaching at level of understanding  - Provide teaching via preferred learning methods  Outcome: Progressing     Problem: Prexisting or High Potential for Compromised Skin Integrity  Goal: Skin integrity is maintained or improved  Description: INTERVENTIONS:  - Identify patients at risk for skin breakdown  - Assess and monitor skin integrity  - Assess and monitor nutrition and hydration status  - Monitor labs   - Assess for incontinence   - Turn and reposition patient  - Assist with mobility/ambulation  - Relieve pressure over bony prominences  - Avoid friction and shearing  - Provide appropriate hygiene as needed including keeping skin clean and dry  - Evaluate need for skin moisturizer/barrier cream  - Collaborate with interdisciplinary team   - Patient/family teaching  - Consider wound care consult   Outcome: Progressing

## 2021-08-10 NOTE — ASSESSMENT & PLAN NOTE
Unclear etiology-patient has diffuse abdominal pain CT of the abdomen without contrast shows no acute abnormality in the abdomen or pelvis  Chronic large ventral hernia containing several loops of small intestine and the portion of cecum without evidence of incarceration noted  Patient has UTI and will give IV antibiotic with Zosyn  Lipase within normal limit lactic acid is 2  Consider General surgery input if no improvement  Abdominal pain has resolved  Patient noted to have severe anemia-no evidence of active GI bleed-will receive PRBC transfusion and FFP as and INR is elevated and also give vitamin K  Patient evaluated by gastroenterology and is scheduled for EGD in a m [facility] Patient underwent EGD today which showed   Patient may have bled from supratherapeutic INR  And significant anemia noted and received 2 units of PRBC and FFP and INR is less than 2  Hemoglobin is above 7  Will monitor for further bleeding if continues to drop hemoglobin may need colonoscopy  Patient is a drop-in hemoglobin to less than 7  The patient has dark stool however takes iron supplements  Patient denies of abdominal pain  Plan was discussed with Gastroenterology and will be scheduled for colonoscopy tomorrow

## 2021-08-10 NOTE — PROGRESS NOTES
Christa U  65   Progress Note - Ness Post 1960, 57 y o  female MRN: [id number]  Unit/Bed#: -Oral Encounter: [id number]  Primary Care Provider: Caroline Kumar MD   Date and time admitted to hospital: 8/7/2021  1:18 PM    * Abdominal pain  Assessment & Plan  Unclear etiology-patient has diffuse abdominal pain CT of the abdomen without contrast shows no acute abnormality in the abdomen or pelvis  Chronic large ventral hernia containing several loops of small intestine and the portion of cecum without evidence of incarceration noted  Patient has UTI and will give IV antibiotic with Zosyn  Lipase within normal limit lactic acid is 2  Consider General surgery input if no improvement  Abdominal pain has resolved  Patient noted to have severe anemia-no evidence of active GI bleed-will receive PRBC transfusion and FFP as and INR is elevated and also give vitamin K  Patient evaluated by gastroenterology and is scheduled for EGD in [facility] Patient underwent EGD today which showed   Patient may have bled from supratherapeutic INR  And significant anemia noted and received 2 units of PRBC and FFP and INR is less than 2  Hemoglobin is above 7  Will monitor for further bleeding if continues to drop hemoglobin may need colonoscopy  Patient is a drop-in hemoglobin to less than 7  The patient has dark stool however takes iron supplements  Patient denies of abdominal pain  Plan was discussed with Gastroenterology and will be scheduled for colonoscopy tomorrow  Supratherapeutic INR  Assessment & Plan  Continue to hold Coumadin and INR is less than 2, patient continues to have severe anemia needing blood transfusion  Patient received vitamin K and FFP and plan for colonoscopy tomorrow  Paroxysmal atrial fibrillation (HCC)  Assessment & Plan  AFib with RVR-heart rate is in 120s to 130s-patient received 5 mg of metoprolol IV, will give p o  Metoprolol 25 b i d  And monitor  Potassium is 5 9 and will correct electrolytes  Will place on cardiac monitor  INR supratherapeutic and will hold Coumadin  Consider to consult Cardiology continues to have uncontrolled heart rate  Anemia due to stage 3b chronic kidney disease [facility])  Assessment & Plan  Lab Results   Component Value Date    EGFR 30 08/10/2021    EGFR 26 08/09/2021    EGFR 28 08/08/2021    CREATININE 1 77 (H) 08/10/2021    CREATININE 2 03 (H) 08/09/2021    CREATININE 1 88 (H) 08/08/2021   Monitor hemoglobin-patient has no active GI bleed  INR is supratherapeutic and will continue to hold Coumadin  Microcytic anemia-Hemoglobin dropped to 6 4 today and has no active GI bleed  GI evaluation was obtained and recommend EGD  Will continue to hold Coumadin and give PRBC transfusion and FFP and monitor hemoglobin  Will give PPI IV  Will check vitamin B12 level  Hemoglobin has dropped less than 7 and will give 1 unit of PRBC plan for colonoscopy tomorrow            Subjective/Objective     Subjective:   Patient is feeling better  Denies of any chest pain or shortness of breath  Patient has dark stool however it is forming now but with takes iron supplements but patient was noted to have significant anemia again and denies of any nausea vomiting or abdominal pain  Objective:  Vitals: Blood pressure 132/74, pulse (!) 109, temperature 99 4 °F (37 4 °C), resp  rate 20, height 5' 4" (1 626 m), weight 136 kg (300 lb), SpO2 100 %, not currently breastfeeding  ,Body mass index is 51 49 kg/m²        Intake/Output Summary (Last 24 hours) at 8/10/2021 1446  Last data filed at 8/10/2021 1430  Gross per 24 hour   Intake 710 ml   Output 800 ml   Net -90 ml       Invasive Devices     Peripheral Intravenous Line            Peripheral IV 08/07/21 Left Antecubital 3 days                Physical Exam:   HEENT-PERRLA, moist oral mucosa  Neck-supple, no JVD elevation   Respiratory-equal air entry bilaterally, no rales or rhonchi  Cardiovascular system-S1, S2 heard, no murmur or gallops or rubs  Abdomen-soft, nontender, no guarding or rigidity, bowel sounds heard  Extremities-no pedal edema  Peripheral pulses palpable  Musculoskeletal-no contractures  Central nervous system-no acute focal neurological deficit ,no sensory or motor deficit noted  Skin-no rash noted          Lab, Imaging and other studies: I have personally reviewed pertinent reports      VTE Pharmacologic Prophylaxis: RX contraindicated due to:  active bleeding  VTE Mechanical Prophylaxis: sequential compression device

## 2021-08-10 NOTE — PHYSICAL THERAPY NOTE
Physical Therapy Cancellation Note       08/10/21 1450   PT Last Visit   PT Visit Date 08/10/21   Note Type   Note type Evaluation   Cancel Reasons Medical status  (hgb <7 0)   Assessment   Assessment PT consult received and chart reviewed  Pt admitted with abdominal pain and frequent episodes of black/dark stools, +vomiting  Pt underwent EGD 8/9/21  Pt with most recent hgb at 6 5  Will hold PT services per PT protocol due to hgb <7 0  Will follow up next day as able and appropriate to complete PT evaluation         William Sessions, PT, DPT   Available via [employer]  NPI # [id number]  [place] License - [id number]  2/84/4246

## 2021-08-10 NOTE — ASSESSMENT & PLAN NOTE
Continue to hold Coumadin and INR is less than 2, patient continues to have severe anemia needing blood transfusion  Patient received vitamin K and FFP and plan for colonoscopy tomorrow

## 2021-08-10 NOTE — PROGRESS NOTES
Progress Note - Yohana Lee 57 y o  female MRN: [id number]    Unit/Bed#: -01 Encounter: [id number]        Assessment/Plan:  74-year-old female with a history of multiple abdominal surgeries, admitted for abdominal pain found to have urinary tract infection with improvement in abdominal pain after antibiotics  Noted to have significant macrocytic anemia and Coumadin coagulopathy  INR was corrected  INR is 1 26 today      1  Macrocytic anemia:  Likely multifactorial, doubt acute GI blood loss  -EGD was done yesterday which had shown small hiatal hernia, localized hemorrhagic mucosa with erosion in the body of the stomach but no active bleeding although was somewhat friable, hemoglobin trended down to 6 5 today, from 7 9 yesterday  Colonoscopy was performed in 2018  Records were in chart which had shown colon polyp and hemorrhoids, prep was suboptimal to poor,   Patient has no overt bleeding  Patient has dark flecks in stool which is likely from her p o  Iron, no bright red blood per rectum, would recommend repeat colonoscopy at this time  Spoke with patient and partner at bedside      2  Lower abdominal pain:  Resolved after treatment for urinary tract infection, no evidence of obstruction on CT scan, has improved    Subjective:   Patient is lying in bed  Hemoglobin dropped again to 6 5 and she is getting another unit of packed RBCs  Patient has been having diarrhea and she states that she has had no rectal bleeding  Visualized the stool and there was dark flecks and the occult blood yesterday was negative  Patient reports stool has been dark because she takes p o  Iron at home  She states that abdominal pain has resolved  She otherwise is feeling well  Patient reports that capsule was suggested in past, but was not done      Objective:     Vitals: /55 (BP Location: Right arm)   Pulse 103   Temp 99 2 °F (37 3 °C) (Tympanic)   Resp 18   Ht 5' 4" (1 626 m)   Wt 136 kg (300 lb)   SpO2 100%   BMI 51 49 kg/m²       Physical Exam:  Gen-alert no acute distress  Abd-positive bowel sounds, nondistended, obese, nontender, no rebound rigidity or guarding       Lab, Imaging and other studies:   Recent Results (from the past 72 hour(s))   ECG 12 lead    Collection Time: 08/07/21  1:21 PM   Result Value Ref Range    Ventricular Rate 136 BPM    Atrial Rate 149 BPM    LA Interval 101 ms    QRSD Interval 75 ms    QT Interval 337 ms    QTC Interval 507 ms    P Axis  degrees    QRS Axis 52 degrees    T Wave Axis 62 degrees   CBC and differential    Collection Time: 08/07/21  1:55 PM   Result Value Ref Range    WBC 16 52 (H) 4 31 - 10 16 Thousand/uL    RBC 2 77 (L) 3 81 - 5 12 Million/uL    Hemoglobin 8 9 (L) 11 5 - 15 4 g/dL    Hematocrit 28 6 (L) 34 8 - 46 1 %     (H) 82 - 98 fL    MCH 32 1 26 8 - 34 3 pg    MCHC 31 1 (L) 31 4 - 37 4 g/dL    RDW 15 0 11 6 - 15 1 %    MPV 10 1 8 9 - 12 7 fL    Platelets 568 643 - 507 Thousands/uL    Neutrophils Relative 80 (H) 43 - 75 %    Immat GRANS % 1 0 - 2 %    Lymphocytes Relative 11 (L) 14 - 44 %    Monocytes Relative 7 4 - 12 %    Eosinophils Relative 1 0 - 6 %    Basophils Relative 0 0 - 1 %    Neutrophils Absolute 13 14 (H) 1 85 - 7 62 Thousands/µL    Immature Grans Absolute 0 19 0 00 - 0 20 Thousand/uL    Lymphocytes Absolute 1 82 0 60 - 4 47 Thousands/µL    Monocytes Absolute 1 22 0 17 - 1 22 Thousand/µL    Eosinophils Absolute 0 11 0 00 - 0 61 Thousand/µL    Basophils Absolute 0 04 0 00 - 0 10 Thousands/µL   Comprehensive metabolic panel    Collection Time: 08/07/21  1:55 PM   Result Value Ref Range    Sodium 137 133 - 145 mmol/L    Potassium 5 9 (H) 3 5 - 5 0 mmol/L    Chloride 103 96 - 108 mmol/L    CO2 23 22 - 33 mmol/L    ANION GAP 11 4 - 13 mmol/L    BUN 78 (H) 6 - 20 mg/dL    Creatinine 1 74 (H) 0 40 - 1 10 mg/dL    Glucose 197 (H) 65 - 140 mg/dL    Calcium 9 2 8 4 - 10 2 mg/dL    Corrected Calcium 9 7 8 3 - 10 1 mg/dL    AST 12 (L) 15 - 41 U/L    ALT 9 5 - 54 U/L    Alkaline Phosphatase 95 8 35 - 140 U/L    Total Protein 7 2 6 4 - 8 3 g/dL    Albumin 3 4 3 4 - 4 8 g/dL    Total Bilirubin 0 32 0 30 - 1 20 mg/dL    eGFR 31 ml/min/1 73sq m   Lipase    Collection Time: 08/07/21  1:55 PM   Result Value Ref Range    Lipase 20 13 - 60 u/L   Lactic acid    Collection Time: 08/07/21  1:55 PM   Result Value Ref Range    LACTIC ACID 2 0 0 0 - 2 0 mmol/L   Troponin I    Collection Time: 08/07/21  1:55 PM   Result Value Ref Range    Troponin I <0 03 0 00 - 0 07 ng/mL   Protime-INR    Collection Time: 08/07/21  1:55 PM   Result Value Ref Range    Protime 56 0 (H) 9 5 - 12 1 seconds    INR 5 37 (HH) 0 90 - 1 10   APTT    Collection Time: 08/07/21  1:55 PM   Result Value Ref Range    PTT 49 (H) 23 - 31 seconds   TSH, 3rd generation with Free T4 reflex    Collection Time: 08/07/21  1:55 PM   Result Value Ref Range    TSH 3RD GENERATON 6 858 (H) 0 340 - 5 600 uIU/mL   T4, free    Collection Time: 08/07/21  1:55 PM   Result Value Ref Range    Free T4 1 18 0 76 - 1 46 ng/dL   UA w Reflex to Microscopic w Reflex to Culture    Collection Time: 08/07/21  3:27 PM    Specimen: Urine, Clean Catch   Result Value Ref Range    Color, UA Yellow Yellow    Clarity, UA Clear Clear    Specific [place], UA 1 010 1 001 - 1 030    pH, UA 5 5 5 0, 5 5, 6 0, 6 5, 7 0, 7 5, 8 0    Leukocytes, UA 1+ (A) Negative    Nitrite, UA Negative Negative    Protein, UA Negative Negative, Interference- unable to analyze mg/dl    Glucose, UA Negative Negative mg/dl    Ketones, UA Negative Negative mg/dl    Urobilinogen, UA 0 2 0 2, 1 0 E U /dl E U /dl    Bilirubin, UA Negative Negative    Blood, UA 2+ (A) Negative   Urine Microscopic    Collection Time: 08/07/21  3:27 PM   Result Value Ref Range    RBC, UA 1-2 None Seen, 0-1, 1-2, 2-4, 0-5 /hpf    WBC, UA 4-10 (A) None Seen, 0-1, 1-2, 0-5, 2-4 /hpf    Epithelial Cells Moderate (A) None Seen, Occasional /hpf    Bacteria, UA Occasional None Seen, Occasional /hpf Fingerstick Glucose (POCT)    Collection Time: 08/07/21  8:46 PM   Result Value Ref Range    POC Glucose 182 (H) 65 - 140 mg/dl   Basic metabolic panel    Collection Time: 08/08/21  6:23 AM   Result Value Ref Range    Sodium 140 133 - 145 mmol/L    Potassium 5 3 (H) 3 5 - 5 0 mmol/L    Chloride 105 96 - 108 mmol/L    CO2 29 22 - 33 mmol/L    ANION GAP 6 4 - 13 mmol/L    BUN 79 (H) 6 - 20 mg/dL    Creatinine 1 88 (H) 0 40 - 1 10 mg/dL    Glucose 124 65 - 140 mg/dL    Glucose, Fasting 124 (H) 70 - 105 mg/dL    Calcium 8 6 8 4 - 10 2 mg/dL    eGFR 28 ml/min/1 73sq m   CBC (With Platelets)    Collection Time: 08/08/21  6:23 AM   Result Value Ref Range    WBC 13 07 (H) 4 31 - 10 16 Thousand/uL    RBC 2 03 (L) 3 81 - 5 12 Million/uL    Hemoglobin 6 4 (LL) 11 5 - 15 4 g/dL    Hematocrit 21 1 (L) 34 8 - 46 1 %     (H) 82 - 98 fL    MCH 31 5 26 8 - 34 3 pg    MCHC 30 3 (L) 31 4 - 37 4 g/dL    RDW 15 3 (H) 11 6 - 15 1 %    Platelets 367 812 - 003 Thousands/uL    MPV 9 8 8 9 - 12 7 fL   Iron Saturation %    Collection Time: 08/08/21  6:23 AM   Result Value Ref Range    Iron Saturation 45 %    TIBC 262 250 - 450 ug/dL    Iron 119 50 - 170 ug/dL   Ferritin    Collection Time: 08/08/21  6:23 AM   Result Value Ref Range    Ferritin 66 8 - 388 ng/mL   Vitamin B12    Collection Time: 08/08/21  6:23 AM   Result Value Ref Range    Vitamin B-12 314 100 - 900 pg/mL   Protime-INR    Collection Time: 08/08/21  6:25 AM   Result Value Ref Range    Protime 54 0 (H) 9 5 - 12 1 seconds    INR 5 17 (HH) 0 90 - 1 10   Fingerstick Glucose (POCT)    Collection Time: 08/08/21  7:10 AM   Result Value Ref Range    POC Glucose 143 (H) 65 - 140 mg/dl   Type and screen    Collection Time: 08/08/21 11:29 AM   Result Value Ref Range    ABO Grouping O     Rh Factor Positive     Antibody Screen Negative     Specimen Expiration Date [id number]    Protime-INR    Collection Time: 08/08/21 11:29 AM   Result Value Ref Range    Protime 60 3 (H) 9 5 - 12 1 seconds    INR 5 80 (HH) 0 90 - 1 10   Fingerstick Glucose (POCT)    Collection Time: 08/08/21 11:47 AM   Result Value Ref Range    POC Glucose 167 (H) 65 - 140 mg/dl   ABORh Recheck - Contact Blood Bank Prior to Collection    Collection Time: 08/08/21  1:12 PM   Result Value Ref Range    ABO Grouping O     Rh Factor Positive    Hemoglobin and hematocrit, blood    Collection Time: 08/08/21  5:51 PM   Result Value Ref Range    Hemoglobin 5 3 (LL) 11 5 - 15 4 g/dL    Hematocrit 17 5 (L) 34 8 - 46 1 %   Blood culture    Collection Time: 08/08/21  5:51 PM    Specimen: Blood   Result Value Ref Range    Blood Culture No Growth at 24 hrs  Lactic acid, plasma    Collection Time: 08/08/21  6:09 PM   Result Value Ref Range    LACTIC ACID 1 3 0 0 - 2 0 mmol/L   Blood culture    Collection Time: 08/08/21  6:10 PM    Specimen: Line, Arterial; Blood   Result Value Ref Range    Blood Culture No Growth at 24 hrs      Fingerstick Glucose (POCT)    Collection Time: 08/08/21  9:25 PM   Result Value Ref Range    POC Glucose 146 (H) 65 - 140 mg/dl   Basic metabolic panel    Collection Time: 08/09/21  5:20 AM   Result Value Ref Range    Sodium 139 133 - 145 mmol/L    Potassium 4 3 3 5 - 5 0 mmol/L    Chloride 104 96 - 108 mmol/L    CO2 29 22 - 33 mmol/L    ANION GAP 6 4 - 13 mmol/L    BUN 69 (H) 6 - 20 mg/dL    Creatinine 2 03 (H) 0 40 - 1 10 mg/dL    Glucose 123 65 - 140 mg/dL    Calcium 8 7 8 4 - 10 2 mg/dL    eGFR 26 ml/min/1 73sq m   CBC    Collection Time: 08/09/21  5:20 AM   Result Value Ref Range    WBC 10 14 4 31 - 10 16 Thousand/uL    RBC 2 36 (L) 3 81 - 5 12 Million/uL    Hemoglobin 7 3 (L) 11 5 - 15 4 g/dL    Hematocrit 23 2 (L) 34 8 - 46 1 %    MCV 98 82 - 98 fL    MCH 30 9 26 8 - 34 3 pg    MCHC 31 5 31 4 - 37 4 g/dL    RDW 18 2 (H) 11 6 - 15 1 %    Platelets 176 233 - 567 Thousands/uL    MPV 10 0 8 9 - 12 7 fL   Occult Bloood,Fecal Immunochemical    Collection Time: 08/09/21  5:57 AM   Result Value Ref Range    OCCULT BLD, FECAL IMMUNOLOGICAL Negative Negative   Prepare fresh frozen plasma: 1 Units    Collection Time: 08/09/21  6:30 AM   Result Value Ref Range    Unit Product Code Y4372I22     Unit Number [id number]     Unit ABO O     Unit [facility] POS     Unit Dispense Status Presumed Trans     Unit Product Volume 296 ml   Prepare Leukoreduced RBC: 1 Units    Collection Time: 08/09/21  6:30 AM   Result Value Ref Range    Unit Product Code Y2939X55     Unit Number [id number]     Unit ABO O     Unit [facility] POS     Crossmatch Compatible     Unit Dispense Status Presumed Trans     Unit Product Volume 350 ml   Prepare Leukoreduced RBC: 1 Units    Collection Time: 08/09/21  6:30 AM   Result Value Ref Range    Unit Product Code H0652K25     Unit Number [id number]     Unit ABO O     Unit [facility] POS     Crossmatch Compatible     Unit Dispense Status Presumed Trans     Unit Product Volume 350 ml   Fingerstick Glucose (POCT)    Collection Time: 08/09/21  6:50 AM   Result Value Ref Range    POC Glucose 135 65 - 140 mg/dl   Fingerstick Glucose (POCT)    Collection Time: 08/09/21  9:32 AM   Result Value Ref Range    POC Glucose 137 65 - 140 mg/dl   Protime-INR    Collection Time: 08/09/21  9:51 AM   Result Value Ref Range    Protime 20 1 (H) 9 5 - 12 1 seconds    INR 1 83 (H) 0 90 - 1 10   CBC and differential    Collection Time: 08/09/21 10:04 AM   Result Value Ref Range    WBC 11 44 (H) 4 31 - 10 16 Thousand/uL    RBC 2 55 (L) 3 81 - 5 12 Million/uL    Hemoglobin 7 9 (L) 11 5 - 15 4 g/dL    Hematocrit 25 2 (L) 34 8 - 46 1 %    MCV 99 (H) 82 - 98 fL    MCH 31 0 26 8 - 34 3 pg    MCHC 31 3 (L) 31 4 - 37 4 g/dL    RDW 18 6 (H) 11 6 - 15 1 %    MPV 9 8 8 9 - 12 7 fL    Platelets 024 272 - 586 Thousands/uL   Manual Differential(PHLEBS Do Not Order)    Collection Time: 08/09/21 10:04 AM   Result Value Ref Range    Segmented % 72 43 - 75 %    Lymphocytes % 20 14 - 44 %    Monocytes % 6 4 - 12 %    Eosinophils, % 1 0 - 6 %    Basophils % 1 0 - 1 % Absolute Neutrophils 8 24 (H) 1 85 - 7 62 Thousand/uL    Lymphocytes Absolute 2 29 0 60 - 4 47 Thousand/uL    Monocytes Absolute 0 69 0 00 - 1 22 Thousand/uL    Eosinophils Absolute 0 11 0 00 - 0 40 Thousand/uL    Basophils Absolute 0 11 (H) 0 00 - 0 10 Thousand/uL    Total Counted 100     RBC Morphology Present     Basophilic Stippling Present     Polychromasia Present     Platelet Estimate Adequate Adequate   Fingerstick Glucose (POCT)    Collection Time: 08/09/21  5:29 PM   Result Value Ref Range    POC Glucose 139 65 - 140 mg/dl   Fingerstick Glucose (POCT)    Collection Time: 08/09/21  9:24 PM   Result Value Ref Range    POC Glucose 124 65 - 140 mg/dl   CBC    Collection Time: 08/10/21  6:39 AM   Result Value Ref Range    WBC 8 62 4 31 - 10 16 Thousand/uL    RBC 2 07 (L) 3 81 - 5 12 Million/uL    Hemoglobin 6 5 (LL) 11 5 - 15 4 g/dL    Hematocrit 20 8 (L) 34 8 - 46 1 %     (H) 82 - 98 fL    MCH 31 4 26 8 - 34 3 pg    MCHC 31 3 (L) 31 4 - 37 4 g/dL    RDW 18 5 (H) 11 6 - 15 1 %    Platelets 287 952 - 755 Thousands/uL    MPV 9 7 8 9 - 12 7 fL   Protime-INR    Collection Time: 08/10/21  6:39 AM   Result Value Ref Range    Protime 14 1 (H) 9 5 - 12 1 seconds    INR 1 26 (H) 0 90 - 9 99   Basic metabolic panel    Collection Time: 08/10/21  6:39 AM   Result Value Ref Range    Sodium 142 133 - 145 mmol/L    Potassium 3 9 3 5 - 5 0 mmol/L    Chloride 105 96 - 108 mmol/L    CO2 32 22 - 33 mmol/L    ANION GAP 5 4 - 13 mmol/L    BUN 47 (H) 6 - 20 mg/dL    Creatinine 1 77 (H) 0 40 - 1 10 mg/dL    Glucose 131 65 - 140 mg/dL    Calcium 8 8 8 4 - 10 2 mg/dL    eGFR 30 ml/min/1 73sq m   Fingerstick Glucose (POCT)    Collection Time: 08/10/21  6:51 AM   Result Value Ref Range    POC Glucose 139 65 - 140 mg/dl   Prepare Leukoreduced RBC: 1 Units    Collection Time: 08/10/21 10:13 AM   Result Value Ref Range    Unit Product Code E8704F38     Unit Number [id number]     Unit ABO O     Unit [facility] POS     Crossmatch Compatible     Unit Dispense Status Issued     Unit Product Volume 350 ml

## 2021-08-11 ENCOUNTER — APPOINTMENT (INPATIENT)
Dept: GASTROENTEROLOGY | Facility: HOSPITAL | Age: 61
DRG: 872 | End: 2021-08-11
Payer: MEDICARE

## 2021-08-11 ENCOUNTER — ANESTHESIA (INPATIENT)
Dept: GASTROENTEROLOGY | Facility: HOSPITAL | Age: 61
DRG: 872 | End: 2021-08-11
Payer: MEDICARE

## 2021-08-11 ENCOUNTER — ANESTHESIA EVENT (INPATIENT)
Dept: GASTROENTEROLOGY | Facility: HOSPITAL | Age: 61
DRG: 872 | End: 2021-08-11
Payer: MEDICARE

## 2021-08-11 LAB
ABO GROUP BLD BPU: NORMAL
ANION GAP SERPL CALCULATED.3IONS-SCNC: 7 MMOL/L (ref 4–13)
BPU ID: NORMAL
BUN SERPL-MCNC: 30 MG/DL (ref 6–20)
CALCIUM SERPL-MCNC: 8.7 MG/DL (ref 8.4–10.2)
CHLORIDE SERPL-SCNC: 104 MMOL/L (ref 96–108)
CO2 SERPL-SCNC: 29 MMOL/L (ref 22–33)
CREAT SERPL-MCNC: 1.67 MG/DL (ref 0.4–1.1)
CROSSMATCH: NORMAL
ERYTHROCYTE [DISTWIDTH] IN BLOOD BY AUTOMATED COUNT: 18.9 % (ref 11.6–15.1)
GFR SERPL CREATININE-BSD FRML MDRD: 33 ML/MIN/1.73SQ M
GLUCOSE SERPL-MCNC: 107 MG/DL (ref 65–140)
GLUCOSE SERPL-MCNC: 115 MG/DL (ref 65–140)
GLUCOSE SERPL-MCNC: 118 MG/DL (ref 65–140)
GLUCOSE SERPL-MCNC: 135 MG/DL (ref 65–140)
GLUCOSE SERPL-MCNC: 140 MG/DL (ref 65–140)
HCT VFR BLD AUTO: 25.2 % (ref 34.8–46.1)
HGB BLD-MCNC: 7.8 G/DL (ref 11.5–15.4)
MCH RBC QN AUTO: 31.2 PG (ref 26.8–34.3)
MCHC RBC AUTO-ENTMCNC: 31 G/DL (ref 31.4–37.4)
MCV RBC AUTO: 101 FL (ref 82–98)
PLATELET # BLD AUTO: 258 THOUSANDS/UL (ref 149–390)
PMV BLD AUTO: 10.2 FL (ref 8.9–12.7)
POTASSIUM SERPL-SCNC: 3.9 MMOL/L (ref 3.5–5)
RBC # BLD AUTO: 2.5 MILLION/UL (ref 3.81–5.12)
SODIUM SERPL-SCNC: 140 MMOL/L (ref 133–145)
UNIT DISPENSE STATUS: NORMAL
UNIT PRODUCT CODE: NORMAL
UNIT PRODUCT VOLUME: 350 ML
UNIT RH: NORMAL
WBC # BLD AUTO: 9.3 THOUSAND/UL (ref 4.31–10.16)

## 2021-08-11 PROCEDURE — 0DJ08ZZ INSPECTION OF UPPER INTESTINAL TRACT, VIA NATURAL OR ARTIFICIAL OPENING ENDOSCOPIC: ICD-10-PCS | Performed by: INTERNAL MEDICINE

## 2021-08-11 PROCEDURE — 80048 BASIC METABOLIC PNL TOTAL CA: CPT | Performed by: INTERNAL MEDICINE

## 2021-08-11 PROCEDURE — 45378 DIAGNOSTIC COLONOSCOPY: CPT | Performed by: INTERNAL MEDICINE

## 2021-08-11 PROCEDURE — 99232 SBSQ HOSP IP/OBS MODERATE 35: CPT | Performed by: INTERNAL MEDICINE

## 2021-08-11 PROCEDURE — 85027 COMPLETE CBC AUTOMATED: CPT | Performed by: INTERNAL MEDICINE

## 2021-08-11 PROCEDURE — 82948 REAGENT STRIP/BLOOD GLUCOSE: CPT

## 2021-08-11 PROCEDURE — C9113 INJ PANTOPRAZOLE SODIUM, VIA: HCPCS | Performed by: INTERNAL MEDICINE

## 2021-08-11 PROCEDURE — 44360 SMALL BOWEL ENDOSCOPY: CPT | Performed by: INTERNAL MEDICINE

## 2021-08-11 PROCEDURE — 0DJD8ZZ INSPECTION OF LOWER INTESTINAL TRACT, VIA NATURAL OR ARTIFICIAL OPENING ENDOSCOPIC: ICD-10-PCS | Performed by: INTERNAL MEDICINE

## 2021-08-11 RX ORDER — LIDOCAINE HYDROCHLORIDE 10 MG/ML
INJECTION, SOLUTION EPIDURAL; INFILTRATION; INTRACAUDAL; PERINEURAL AS NEEDED
Status: DISCONTINUED | OUTPATIENT
Start: 2021-08-11 | End: 2021-08-11

## 2021-08-11 RX ORDER — DEXMEDETOMIDINE HYDROCHLORIDE 100 UG/ML
INJECTION, SOLUTION INTRAVENOUS AS NEEDED
Status: DISCONTINUED | OUTPATIENT
Start: 2021-08-11 | End: 2021-08-11

## 2021-08-11 RX ORDER — WARFARIN SODIUM 1 MG/1
1 TABLET ORAL
Status: DISCONTINUED | OUTPATIENT
Start: 2021-08-11 | End: 2021-08-12 | Stop reason: HOSPADM

## 2021-08-11 RX ORDER — METOPROLOL TARTRATE 50 MG/1
50 TABLET, FILM COATED ORAL EVERY 12 HOURS SCHEDULED
Status: DISCONTINUED | OUTPATIENT
Start: 2021-08-11 | End: 2021-08-12 | Stop reason: HOSPADM

## 2021-08-11 RX ORDER — PROPOFOL 10 MG/ML
INJECTION, EMULSION INTRAVENOUS AS NEEDED
Status: DISCONTINUED | OUTPATIENT
Start: 2021-08-11 | End: 2021-08-11

## 2021-08-11 RX ORDER — SODIUM CHLORIDE, SODIUM LACTATE, POTASSIUM CHLORIDE, CALCIUM CHLORIDE 600; 310; 30; 20 MG/100ML; MG/100ML; MG/100ML; MG/100ML
75 INJECTION, SOLUTION INTRAVENOUS CONTINUOUS
Status: DISPENSED | OUTPATIENT
Start: 2021-08-11 | End: 2021-08-11

## 2021-08-11 RX ADMIN — DEXMEDETOMIDINE HCL 12 MCG: 100 INJECTION INTRAVENOUS at 09:33

## 2021-08-11 RX ADMIN — Medication 40 MG: at 09:38

## 2021-08-11 RX ADMIN — LORAZEPAM 0.5 MG: 0.5 TABLET ORAL at 21:10

## 2021-08-11 RX ADMIN — PANTOPRAZOLE SODIUM 40 MG: 40 INJECTION, POWDER, FOR SOLUTION INTRAVENOUS at 12:36

## 2021-08-11 RX ADMIN — PANTOPRAZOLE SODIUM 40 MG: 40 INJECTION, POWDER, FOR SOLUTION INTRAVENOUS at 21:00

## 2021-08-11 RX ADMIN — DEXMEDETOMIDINE HCL 8 MCG: 100 INJECTION INTRAVENOUS at 09:40

## 2021-08-11 RX ADMIN — LORAZEPAM 0.5 MG: 0.5 TABLET ORAL at 00:50

## 2021-08-11 RX ADMIN — DULOXETINE HYDROCHLORIDE 30 MG: 30 CAPSULE, DELAYED RELEASE ORAL at 12:35

## 2021-08-11 RX ADMIN — PROPOFOL 120 MG: 10 INJECTION, EMULSION INTRAVENOUS at 09:33

## 2021-08-11 RX ADMIN — CHOLECALCIFEROL TAB 10 MCG (400 UNIT) 400 UNITS: 10 TAB at 12:35

## 2021-08-11 RX ADMIN — DULOXETINE HYDROCHLORIDE 30 MG: 30 CAPSULE, DELAYED RELEASE ORAL at 17:20

## 2021-08-11 RX ADMIN — CYANOCOBALAMIN TAB 500 MCG 500 MCG: 500 TAB at 12:35

## 2021-08-11 RX ADMIN — LEVOTHYROXINE SODIUM 100 MCG: 150 TABLET ORAL at 04:43

## 2021-08-11 RX ADMIN — METOPROLOL TARTRATE 50 MG: 50 TABLET, FILM COATED ORAL at 21:01

## 2021-08-11 RX ADMIN — LIDOCAINE HYDROCHLORIDE 100 MG: 10 INJECTION, SOLUTION EPIDURAL; INFILTRATION; INTRACAUDAL; PERINEURAL at 09:33

## 2021-08-11 RX ADMIN — ATORVASTATIN CALCIUM 10 MG: 10 TABLET, FILM COATED ORAL at 17:20

## 2021-08-11 RX ADMIN — WARFARIN SODIUM 1 MG: 1 TABLET ORAL at 17:20

## 2021-08-11 RX ADMIN — SODIUM CHLORIDE, SODIUM LACTATE, POTASSIUM CHLORIDE, AND CALCIUM CHLORIDE 75 ML/HR: .6; .31; .03; .02 INJECTION, SOLUTION INTRAVENOUS at 14:31

## 2021-08-11 NOTE — ASSESSMENT & PLAN NOTE
Episodes of RVR noted  Increase metoprolol to 50 b i d    Continue Coumadin for anticoagulation  Coumadin resumed today  Goal INR 2-3  Outpatient follow-up

## 2021-08-11 NOTE — ASSESSMENT & PLAN NOTE
Lab Results   Component Value Date    HGBA1C 6 3 03/23/2021       Recent Labs     08/10/21  1638 08/10/21  2050 08/11/21  0743 08/11/21  1135   POCGLU 107 117 115 118       Blood Sugar Average: Last 72 hrs:  (P) 159 [id number]     Monitor Accu-Cheks  Avoid hypoglycemia  Hypoglycemia protocol in place

## 2021-08-11 NOTE — INTERVAL H&P NOTE
H&P reviewed  After examining the patient I find no changes in the patients condition since the H&P had been written      Vitals:    08/11/21 0021   BP: 112/57   Pulse: 86   Resp: 20   Temp: 99 8 °F (37 7 °C)   SpO2: 95%

## 2021-08-11 NOTE — PROGRESS NOTES
Christa U  65   Progress Note - Rusty Arango 1960, 57 y o  female MRN: [id number]  Unit/Bed#: -Oral Encounter: [id number]  Primary Care Provider: Maria Eugenia Daniel MD   Date and time admitted to hospital: 8/7/2021  1:18 PM    * Abdominal pain  Assessment & Plan  Presented with abdominal pain now resolved  GI input noted  Patient status post EGD, push enteroscopy and colonoscopy no obvious GI bleed noted  Advanced diet as tolerated    Supratherapeutic INR  Assessment & Plan  Patient is status post vitamin K FFP  INR 1 1  Coumadin for goal 2-2 5    Diabetes mellitus [facility])  Assessment & Plan  Lab Results   Component Value Date    HGBA1C 6 3 03/23/2021       Recent Labs     08/10/21  1638 08/10/21  2050 08/11/21  0743 08/11/21  1135   POCGLU 107 117 115 118       Blood Sugar Average: Last 72 hrs:  (P) 137 [id number]     Monitor Accu-Cheks  Avoid hypoglycemia  Hypoglycemia protocol in place    Hypothyroid  Assessment & Plan  Continue levothyroxine    COPD with asthma ([facility] [address] )  Assessment & Plan  Continue respiratory treatment    Paroxysmal atrial fibrillation (HCC)  Assessment & Plan  Episodes of RVR noted  Increase metoprolol to 50 b i d  Continue Coumadin for anticoagulation  Coumadin resumed today  Goal INR 2-3  Outpatient follow-up    Anemia due to stage 3b chronic kidney disease [facility])  Assessment & Plan  Lab Results   Component Value Date    EGFR 33 08/11/2021    EGFR 30 08/10/2021    EGFR 26 08/09/2021    CREATININE 1 67 (H) 08/11/2021    CREATININE 1 77 (H) 08/10/2021    CREATININE 2 03 (H) 08/09/2021     Received packed cells  Monitor hemoglobin            VTE Pharmacologic Prophylaxis:   Pharmacologic: Warfarin (Coumadin)  Mechanical VTE Prophylaxis in Place: Yes    Patient Centered Rounds: I have performed bedside rounds with nursing staff today      Discussions with Specialists or Other Care Team Provider:     Education and Discussions with Family / Patient: Discussed the patient, family at bedside updated in detail questions answered    Time Spent for Care: 30 minutes  More than 50% of total time spent on counseling and coordination of care as described above  Current Length of Stay: 3 day(s)    Current Patient Status: Inpatient   Certification Statement: The patient will continue to require additional inpatient hospital stay due to As outlined    Discharge Plan:  Awaiting clinical and symptomatic improvement, possible discharge 24 to 48 hours    Code Status: Level 1 - Full Code      Subjective:     Comfortably lying in bed  Patient is back from GI suite  Reports feeling okay  History chart labs medications reviewed  Denies abdominal pain nausea vomiting    Objective:     Vitals:   Temp (24hrs), Av 8 °F (37 1 °C), Min:97 5 °F (36 4 °C), Max:99 8 °F (37 7 °C)    Temp:  [97 5 °F (36 4 °C)-99 8 °F (37 7 °C)] 97 5 °F (36 4 °C)  HR:  [] 103  Resp:  [18-20] 18  BP: (100-132)/(55-92) 101/57  SpO2:  [95 %-100 %] 97 %  Body mass index is 51 49 kg/m²  Input and Output Summary (last 24 hours):        Intake/Output Summary (Last 24 hours) at 2021 1428  Last data filed at 2021 1008  Gross per 24 hour   Intake 550 ml   Output --   Net 550 ml       Physical Exam:     Physical Exam    Comfortably in bed  Morbidly obese  Short thick neck  Lungs diminished breath sounds bilaterally  Heart sounds distant S1-S2 noted  Abdomen soft  Abdominal obesity noted  Awake alert obeys simple commands  Pulses noted  No rash    Additional Data:     Labs:    Results from last 7 days   Lab Units 21  0524 21  1004 21  1355   WBC Thousand/uL 9 30 11 44* 16 52*   HEMOGLOBIN g/dL 7 8* 7 9* 8 9*   HEMATOCRIT % 25 2* 25 2* 28 6*   PLATELETS Thousands/uL 258 271 383   NEUTROS PCT %  --   --  80*   LYMPHS PCT %  --   --  11*   LYMPHO PCT %  --  20  --    MONOS PCT %  --   --  7   MONO PCT %  --  6  --    EOS PCT %  --  1 1     Results from last 7 days   Lab Units 08/11/21  0524 08/07/21  1355   SODIUM mmol/L 140 137   POTASSIUM mmol/L 3 9 5 9*   CHLORIDE mmol/L 104 103   CO2 mmol/L 29 23   BUN mg/dL 30* 78*   CREATININE mg/dL 1 67* 1 74*   ANION GAP mmol/L 7 11   CALCIUM mg/dL 8 7 9 2   ALBUMIN g/dL  --  3 4   TOTAL BILIRUBIN mg/dL  --  0 32   ALK PHOS U/L  --  95 8   ALT U/L  --  9   AST U/L  --  12*   GLUCOSE RANDOM mg/dL 107 197*     Results from last 7 days   Lab Units 08/10/21  1830   INR  1 11*     Results from last 7 days   Lab Units 08/11/21  1135 08/11/21  0743 08/10/21  2050 08/10/21  1638 08/10/21  1055 08/10/21  0651 08/09/21  2124 08/09/21  1729 08/09/21  0932 08/09/21  0650 08/08/21  2125 08/08/21  1147   POC GLUCOSE mg/dl 118 115 117 107 201* 139 124 139 137 135 146* 167*         Results from last 7 days   Lab Units 08/08/21  1809 08/07/21  1355   LACTIC ACID mmol/L 1 3 2 0           * I Have Reviewed All Lab Data Listed Above  * Additional Pertinent Lab Tests Reviewed: All Labs Within Last 24 Hours Reviewed    Imaging:    Imaging Reports Reviewed Today Include:  CT abdomen pelvis noted, chest x-ray noted  Imaging Personally Reviewed by Myself Includes:      Recent Cultures (last 7 days):     Results from last 7 days   Lab Units 08/08/21  1810 08/08/21  1751   BLOOD CULTURE  No Growth at 48 hrs  No Growth at 48 hrs         Last 24 Hours Medication List:   Current Facility-Administered Medications   Medication Dose Route Frequency Provider Last Rate    acetaminophen  650 mg Oral Q6H PRN Micheal Tenorio MD      albuterol  1 puff Inhalation Q6H PRN Micheal Tenorio MD      atorvastatin  10 mg Oral Daily With Hyacinth Mendez MD      cholecalciferol  400 Units Oral Daily Micheal Tenorio MD      vitamin B-12  500 mcg Oral Daily Lieutenant Luiz MD      DULoxetine  30 mg Oral BID Lieutenant Luiz MD      ferrous sulfate  325 mg Oral Daily With Breakfast Micheal Tenorio MD      insulin lispro  1-5 Units Subcutaneous TID AC Micheal Tenorio MD      insulin lispro  1-5 Units Subcutaneous HS Micheal Tenorio MD      lactated ringers  75 mL/hr Intravenous Continuous Felicita Michelle MD      levothyroxine  100 mcg Oral Early Morning Micheal Tenorio MD      LORazepam  0 5 mg Oral Q8H PRN Micheal Tenorio MD      metoprolol  2 5 mg Intravenous Q6H PRN Micheal Tenorio MD      metoprolol tartrate  50 mg Oral Q12H Sarah Merida MD      morphine injection  2 mg Intravenous Q4H PRN Micheal Tenorio MD      pantoprazole  40 mg Intravenous Q12H Avery Cordero MD      polyethylene glycol-electrolytes  4,000 mL Oral See Admin Instructions Tom Lopez PA-C      sodium chloride  500 mL Intravenous Once Adwoa Yusuf MD      warfarin  1 mg Oral Daily (warfarin) Felicita Michelle MD          Today, Patient Was Seen By: Felicita Michelle MD    ** Please Note: Dictation voice to text software may have been used in the creation of this document   **

## 2021-08-11 NOTE — PHYSICAL THERAPY NOTE
PHYSICAL THERAPY CANCELLATION NOTE          Patient Name: Cyrus Herrera  BANXY'D Date: 8/11/2021 08/11/21 0810   PT Last Visit   PT Visit Date 08/11/21   Note Type   Note type Evaluation   Cancel Reasons Patient off floor/test   Activity Tolerance   Nurse Made Aware RN Shaina   Assessment   Assessment PT eval orders received, chart review performed  Attempted to see pt for PT eval; however, notified by LEONIE Gibson pt currently off floor for colonoscopy procedure  PT will continue to follow pt as appropriate and as schedule allows          Logan Otto, PT, DPT  08/11/21

## 2021-08-11 NOTE — ANESTHESIA PREPROCEDURE EVALUATION
Procedure:  COLONOSCOPY    Relevant Problems   CARDIO   (+) Paroxysmal atrial fibrillation (HCC)      ENDO   (+) Hyperparathyroidism (HCC)   (+) Hypothyroid   (+) Secondary hyperparathyroidism of renal origin (HCC)      GI/HEPATIC   (+) GERD (gastroesophageal reflux disease)      /RENAL   (+) Acute renal failure superimposed on chronic kidney disease (HCC)   (+) Anemia due to stage 3b chronic kidney disease (HCC)   (+) Chronic kidney disease, stage III (moderate) (HCC)   (+) Hypertensive chronic kidney disease with stage 1 through stage 4 chronic kidney disease, or unspecified chronic kidney disease      HEMATOLOGY   (+) Anemia due to stage 3b chronic kidney disease (HCC)      NEURO/PSYCH   (+) Anxiety      PULMONARY   (+) COPD with asthma (HCC)   (+) SOB (shortness of breath)        Physical Exam    Airway    Mallampati score: III  TM Distance: >3 FB  Neck ROM: full     Dental   upper dentures,     Cardiovascular  Rhythm: irregular, Rate: normal, No murmur, Cardiovascular exam normal    Pulmonary  Pulmonary exam normal Breath sounds clear to auscultation,     Other Findings  Missing lower teeth       Anesthesia Plan  ASA Score- 3     Anesthesia Type- IV sedation with anesthesia with ASA Monitors  Additional Monitors:   Airway Plan:           Plan Factors-Exercise tolerance (METS): <4 METS  Chart reviewed  Patient is not a current smoker  Patient instructed to abstain from smoking on day of procedure  Patient did not smoke on day of surgery  There is medical exclusion for perioperative obstructive sleep apnea risk education  Induction- intravenous  Postoperative Plan-     Informed Consent- Anesthetic plan and risks discussed with patient  I personally reviewed this patient with the CRNA  Discussed and agreed on the Anesthesia Plan with the CRNA  Miriam Calles

## 2021-08-11 NOTE — ANESTHESIA POSTPROCEDURE EVALUATION
Post-Op Assessment Note    CV Status:  Stable  Pain Score: 0    Pain management: adequate     Mental Status:  Alert and awake   Hydration Status:  Euvolemic   PONV Controlled:  Controlled   Airway Patency:  Patent and adequate   Two or more mitigation strategies used for obstructive sleep apnea   Post Op Vitals Reviewed: Yes      Staff: CRNA         No complications documented      BP   122/59   Temp      Pulse  101   Resp   15   SpO2   95%

## 2021-08-11 NOTE — ASSESSMENT & PLAN NOTE
Presented with abdominal pain now resolved  GI input noted  Patient status post EGD, push enteroscopy and colonoscopy no obvious GI bleed noted  Advanced diet as tolerated

## 2021-08-11 NOTE — PLAN OF CARE
Problem: Potential for Falls  Goal: Patient will remain free of falls  Description: INTERVENTIONS:  - Educate patient/family on patient safety including physical limitations  - Instruct patient to call for assistance with activity   - Consult OT/PT to assist with strengthening/mobility   - Keep Call bell within reach  - Keep bed low and locked with side rails adjusted as appropriate  - Keep care items and personal belongings within reach  - Initiate and maintain comfort rounds  - Make Fall Risk Sign visible to staff  - Offer Toileting every 2 Hours, in advance of need  - Initiate/Maintain alarm  - Obtain necessary fall risk management equipment:   - Apply yellow socks and bracelet for high fall risk patients  - Consider moving patient to room near nurses station  Outcome: Progressing     Problem: MOBILITY - ADULT  Goal: Maintain or return to baseline ADL function  Description: INTERVENTIONS:  -  Assess patient's ability to carry out ADLs; assess patient's baseline for ADL function and identify physical deficits which impact ability to perform ADLs (bathing, care of mouth/teeth, toileting, grooming, dressing, etc )  - Assess/evaluate cause of self-care deficits   - Assess range of motion  - Assess patient's mobility; develop plan if impaired  - Assess patient's need for assistive devices and provide as appropriate  - Encourage maximum independence but intervene and supervise when necessary  - Involve family in performance of ADLs  - Assess for home care needs following discharge   - Consider OT consult to assist with ADL evaluation and planning for discharge  - Provide patient education as appropriate  Outcome: Progressing  Goal: Maintains/Returns to pre admission functional level  Description: INTERVENTIONS:  - Perform BMAT or MOVE assessment daily    - Set and communicate daily mobility goal to care team and patient/family/caregiver     - Collaborate with rehabilitation services on mobility goals if consulted  - Reposition patient every 2 hours    - Stand patient 3 times a day  - Ambulate patient 3 times a day  - Out of bed to chair 3 times a day   - Out of bed for meals 3 times a day  - Out of bed for toileting  - Record patient progress and toleration of activity level   Outcome: Progressing     Problem: PAIN - ADULT  Goal: Verbalizes/displays adequate comfort level or baseline comfort level  Description: Interventions:  - Encourage patient to monitor pain and request assistance  - Assess pain using appropriate pain scale  - Administer analgesics based on type and severity of pain and evaluate response  - Implement non-pharmacological measures as appropriate and evaluate response  - Consider cultural and social influences on pain and pain management  - Notify physician/advanced practitioner if interventions unsuccessful or patient reports new pain  Outcome: Progressing     Problem: INFECTION - ADULT  Goal: Absence or prevention of progression during hospitalization  Description: INTERVENTIONS:  - Assess and monitor for signs and symptoms of infection  - Monitor lab/diagnostic results  - Monitor all insertion sites, i e  indwelling lines, tubes, and drains  - Monitor endotracheal if appropriate and nasal secretions for changes in amount and color  - [place] appropriate cooling/warming therapies per order  - Administer medications as ordered  - Instruct and encourage patient and family to use good hand hygiene technique  - Identify and instruct in appropriate isolation precautions for identified infection/condition  Outcome: Progressing     Problem: SAFETY ADULT  Goal: Patient will remain free of falls  Description: INTERVENTIONS:  - Educate patient/family on patient safety including physical limitations  - Instruct patient to call for assistance with activity   - Consult OT/PT to assist with strengthening/mobility   - Keep Call bell within reach  - Keep bed low and locked with side rails adjusted as appropriate  - Keep care items and personal belongings within reach  - Initiate and maintain comfort rounds  - Make Fall Risk Sign visible to staff  - Offer Toileting every 2 Hours, in advance of need  - Initiate/Maintain alarm  - Obtain necessary fall risk management equipment:   - Apply yellow socks and bracelet for high fall risk patients  - Consider moving patient to room near nurses station  Outcome: Progressing  Goal: Maintain or return to baseline ADL function  Description: INTERVENTIONS:  -  Assess patient's ability to carry out ADLs; assess patient's baseline for ADL function and identify physical deficits which impact ability to perform ADLs (bathing, care of mouth/teeth, toileting, grooming, dressing, etc )  - Assess/evaluate cause of self-care deficits   - Assess range of motion  - Assess patient's mobility; develop plan if impaired  - Assess patient's need for assistive devices and provide as appropriate  - Encourage maximum independence but intervene and supervise when necessary  - Involve family in performance of ADLs  - Assess for home care needs following discharge   - Consider OT consult to assist with ADL evaluation and planning for discharge  - Provide patient education as appropriate  Outcome: Progressing  Goal: Maintains/Returns to pre admission functional level  Description: INTERVENTIONS:  - Perform BMAT or MOVE assessment daily    - Set and communicate daily mobility goal to care team and patient/family/caregiver  - Collaborate with rehabilitation services on mobility goals if consulted  - Reposition patient every 2 hours    - Stand patient 3 times a day  - Ambulate patient 3 times a day  - Out of bed to chair 3 times a day   - Out of bed for meals 3 times a day  - Out of bed for toileting  - Record patient progress and toleration of activity level   Outcome: Progressing     Problem: DISCHARGE PLANNING  Goal: Discharge to home or other facility with appropriate resources  Description: INTERVENTIONS:  - Identify barriers to discharge w/patient and caregiver  - Arrange for needed discharge resources and transportation as appropriate  - Identify discharge learning needs (meds, wound care, etc )  - Arrange for interpretive services to assist at discharge as needed  - Refer to Case Management Department for coordinating discharge planning if the patient needs post-hospital services based on physician/advanced practitioner order or complex needs related to functional status, cognitive ability, or social support system  Outcome: Progressing     Problem: Knowledge Deficit  Goal: Patient/family/caregiver demonstrates understanding of disease process, treatment plan, medications, and discharge instructions  Description: Complete learning assessment and assess knowledge base    Interventions:  - Provide teaching at level of understanding  - Provide teaching via preferred learning methods  Outcome: Progressing     Problem: Prexisting or High Potential for Compromised Skin Integrity  Goal: Skin integrity is maintained or improved  Description: INTERVENTIONS:  - Identify patients at risk for skin breakdown  - Assess and monitor skin integrity  - Assess and monitor nutrition and hydration status  - Monitor labs   - Assess for incontinence   - Turn and reposition patient  - Assist with mobility/ambulation  - Relieve pressure over bony prominences  - Avoid friction and shearing  - Provide appropriate hygiene as needed including keeping skin clean and dry  - Evaluate need for skin moisturizer/barrier cream  - Collaborate with interdisciplinary team   - Patient/family teaching  - Consider wound care consult   Outcome: Progressing

## 2021-08-11 NOTE — ASSESSMENT & PLAN NOTE
Lab Results   Component Value Date    EGFR 33 08/11/2021    EGFR 30 08/10/2021    EGFR 26 08/09/2021    CREATININE 1 67 (H) 08/11/2021    CREATININE 1 77 (H) 08/10/2021    CREATININE 2 03 (H) 08/09/2021     Received packed cells  Monitor hemoglobin

## 2021-08-12 VITALS
HEIGHT: 64 IN | RESPIRATION RATE: 16 BRPM | OXYGEN SATURATION: 99 % | WEIGHT: 293 LBS | SYSTOLIC BLOOD PRESSURE: 97 MMHG | DIASTOLIC BLOOD PRESSURE: 52 MMHG | TEMPERATURE: 97.2 F | BODY MASS INDEX: 50.02 KG/M2 | HEART RATE: 74 BPM

## 2021-08-12 LAB
ANION GAP SERPL CALCULATED.3IONS-SCNC: 6 MMOL/L (ref 4–13)
BUN SERPL-MCNC: 21 MG/DL (ref 6–20)
CALCIUM SERPL-MCNC: 9 MG/DL (ref 8.4–10.2)
CHLORIDE SERPL-SCNC: 105 MMOL/L (ref 96–108)
CO2 SERPL-SCNC: 31 MMOL/L (ref 22–33)
CREAT SERPL-MCNC: 1.58 MG/DL (ref 0.4–1.1)
ERYTHROCYTE [DISTWIDTH] IN BLOOD BY AUTOMATED COUNT: 18.8 % (ref 11.6–15.1)
GFR SERPL CREATININE-BSD FRML MDRD: 35 ML/MIN/1.73SQ M
GLUCOSE SERPL-MCNC: 108 MG/DL (ref 65–140)
GLUCOSE SERPL-MCNC: 115 MG/DL (ref 65–140)
GLUCOSE SERPL-MCNC: 116 MG/DL (ref 65–140)
HCT VFR BLD AUTO: 27 % (ref 34.8–46.1)
HGB BLD-MCNC: 8.3 G/DL (ref 11.5–15.4)
INR PPP: 0.97 (ref 0.9–1.1)
MCH RBC QN AUTO: 31.2 PG (ref 26.8–34.3)
MCHC RBC AUTO-ENTMCNC: 30.7 G/DL (ref 31.4–37.4)
MCV RBC AUTO: 102 FL (ref 82–98)
PLATELET # BLD AUTO: 279 THOUSANDS/UL (ref 149–390)
PMV BLD AUTO: 9.8 FL (ref 8.9–12.7)
POTASSIUM SERPL-SCNC: 4.1 MMOL/L (ref 3.5–5)
PROTHROMBIN TIME: 11 SECONDS (ref 9.5–12.1)
RBC # BLD AUTO: 2.66 MILLION/UL (ref 3.81–5.12)
SODIUM SERPL-SCNC: 142 MMOL/L (ref 133–145)
WBC # BLD AUTO: 9.06 THOUSAND/UL (ref 4.31–10.16)

## 2021-08-12 PROCEDURE — 82948 REAGENT STRIP/BLOOD GLUCOSE: CPT

## 2021-08-12 PROCEDURE — 97110 THERAPEUTIC EXERCISES: CPT

## 2021-08-12 PROCEDURE — 85610 PROTHROMBIN TIME: CPT | Performed by: INTERNAL MEDICINE

## 2021-08-12 PROCEDURE — 97163 PT EVAL HIGH COMPLEX 45 MIN: CPT

## 2021-08-12 PROCEDURE — C9113 INJ PANTOPRAZOLE SODIUM, VIA: HCPCS | Performed by: INTERNAL MEDICINE

## 2021-08-12 PROCEDURE — 99239 HOSP IP/OBS DSCHRG MGMT >30: CPT | Performed by: INTERNAL MEDICINE

## 2021-08-12 PROCEDURE — 85027 COMPLETE CBC AUTOMATED: CPT | Performed by: INTERNAL MEDICINE

## 2021-08-12 PROCEDURE — 80048 BASIC METABOLIC PNL TOTAL CA: CPT | Performed by: INTERNAL MEDICINE

## 2021-08-12 RX ORDER — METOPROLOL TARTRATE 50 MG/1
50 TABLET, FILM COATED ORAL EVERY 12 HOURS SCHEDULED
Qty: 60 TABLET | Refills: 0 | Status: SHIPPED | OUTPATIENT
Start: 2021-08-12 | End: 2021-08-19 | Stop reason: SDUPTHER

## 2021-08-12 RX ADMIN — CYANOCOBALAMIN TAB 500 MCG 500 MCG: 500 TAB at 08:22

## 2021-08-12 RX ADMIN — LEVOTHYROXINE SODIUM 100 MCG: 150 TABLET ORAL at 05:08

## 2021-08-12 RX ADMIN — FERROUS SULFATE TAB 325 MG (65 MG ELEMENTAL FE) 325 MG: 325 (65 FE) TAB at 08:22

## 2021-08-12 RX ADMIN — PANTOPRAZOLE SODIUM 40 MG: 40 INJECTION, POWDER, FOR SOLUTION INTRAVENOUS at 08:22

## 2021-08-12 RX ADMIN — DULOXETINE HYDROCHLORIDE 30 MG: 30 CAPSULE, DELAYED RELEASE ORAL at 08:22

## 2021-08-12 RX ADMIN — CHOLECALCIFEROL TAB 10 MCG (400 UNIT) 400 UNITS: 10 TAB at 08:22

## 2021-08-12 NOTE — CASE MANAGEMENT
IMM reviewed with Patient, Patient agrees with discharge determination  CM discussed PT recommendation of home with home PT  Patient stated she would like home SN/PT from preferred agency [facility] due to past favorable experience  Patient also stated if [facility] has no availavbility she would like a referral to be placed to [facility] because she heard good reviews from neighbors  CM placed referral for [facility] and will keep patient updated with referral response  DCP: Home with home health SN/PT services

## 2021-08-12 NOTE — PLAN OF CARE
Problem: PHYSICAL THERAPY ADULT  Goal: Performs mobility at highest level of function for planned discharge setting  See evaluation for individualized goals  Description: Treatment/Interventions: Functional transfer training, LE strengthening/ROM, Therapeutic exercise, Endurance training, Patient/family training, Equipment eval/education, Bed mobility, Gait training          See flowsheet documentation for full assessment, interventions and recommendations  Note: Prognosis: Fair  Problem List: Decreased strength, Decreased endurance, Impaired balance, Decreased mobility, Decreased coordination, Impaired sensation, Obesity  Assessment: Collins Jackson is a 57 y o  Female who presents to [address] [place] [address] on 8/7/2021 from home w/ c/o abdominal pain and vomiting and diagnosis of abdominal pain  Orders for PT eval and treat received w/ fall risk precautions  Pt presents w/ comorbidities of anemia, arthritis, CKS, DM, HTN and personal factors including: mobilizing w/ assistive device, inability to ambulate household distances, inability to perform IADLs and inability to perform ADLs  At baseline, reports independently performing transfers and ambulating short distances w/ RW and reports 0 falls in the last 6 months  Upon evaluation, pt presents w/ the following deficits: weakness, altered sensation, impaired coordination, impaired balance, decreased endurance and gait deviations  Pt requires supervision for bed mobility, Min A/CGAx1 for transfers, and Min A/CGAx1 for gait  Pt's clinical presentation is unstable/unpredictable due to need for assist w/ all phases of mobility when usually mobilizing independently, tolerance to only 4 feet of ambulation, need for supplemental oxygen in order to maintain oxygen saturation, gait deviations, decreased endurance, and need for input for mobility technique/safety   Pt is at an increased risk of falls due to impaired LE sensation, decreased endurance, impaired coordination, and impaired balance  Given the above findings, discharge recommendation is for Home w/ HHPT w/ 24/7 family support/supervision  During this admission, pt would benefit from skilled acute inpatient PT in order to address the abovementioned deficits to maximize function and mobility before DC from acute care  PT Discharge Recommendation: Home with home health rehabilitation (w/ 24/7 family support/supervision)          See flowsheet documentation for full assessment

## 2021-08-12 NOTE — PHYSICAL THERAPY NOTE
PHYSICAL THERAPY EVALUATION NOTE          Patient Name: Celia Finn  USHOLLYB'Z Date: 2021     AGE:   57 y o  Mrn:   [id number]  ADMIT DX:  Abdominal pain [R10 9]  Anemia [D64 9]  Generalized abdominal pain [R10 84]  Chronic a-fib (HCC) [I48 20]  Leukocytosis, unspecified type [D72 829]    Past Medical History:   Diagnosis Date    Anemia     Arthritis     Cancer of kidney ([facility] )     Chronic kidney disease     Diabetes mellitus ([facility] )     Disease of thyroid gland     HLD (hyperlipidemia)     Hypertension     Ovarian cancer ([place] [address] )     Pneumonia      Length Of Stay: 4  PHYSICAL THERAPY EVALUATION :   Patient's identity confirmed via 2 patient identifiers (full name and ) at start of session       21 0948   PT Last Visit   PT Visit Date 21   Note Type   Note type Evaluation   Pain Assessment   Pain Assessment Tool 0-10   Pain Score No Pain   Home Living   Type of Home Apartment   Home Layout One level;Performs ADLs on one level; Able to live on main level with bedroom/bathroom; Ramped entrance   Bathroom Shower/Tub Tub/shower unit   [facility] Grab bars in shower; Shower chair;Grab bars around toilet   Bathroom Accessibility Accessible via wheelchair   [address] [facility]; Wheelchair-manual;Electric scooter;Hospital bed;Grab bars   Prior Function   Level of Amonate Other (Comment)  (Needs assistance w/ ADLs, IADLs)   Lives With Significant other   Receives Help From Neighbor;Other (Comment)  (Pt's significant other)   ADL Assistance Needs assistance  (w/ bathing and dressing)   IADLs Needs assistance   Falls in the last 6 months 0   Comments Pt reports living in 1 level apt w/ ramped entrance w/ significant other who is home and available to assist    At baseline, pt requires assistance w/ ADLs and assistance w/ IADLs w/ pt reporting being able to independently transfer to/from [facility] and ambulate short distances w/ RW  Restrictions/Precautions   Weight Bearing Precautions Per Order No   Other Precautions Chair Alarm; Bed Alarm;O2;Fall Risk  (3L O2 via NC; pt wears 2-3L at baseline)   General   Family/Caregiver Present Yes  (pt's significant other)   Cognition   Overall Cognitive Status WFL   Arousal/Participation Alert   Orientation Level Oriented X4   Memory Decreased recall of precautions   Following Commands Follows one step commands without difficulty   Comments pt ID via name and ; pt agreeable to PT eval   Strength RLE   R Knee Flexion 3/5   R Knee Extension 3/5   R Ankle Dorsiflexion 3+/5   R Ankle Plantar Flexion 3+/5   Strength LLE   L Knee Flexion 3/5   L Knee Extension 3/5   L Ankle Dorsiflexion 3+/5   L Ankle Plantar Flexion 3+/5   Coordination   Movements are Fluid and Coordinated 0   Coordination and Movement Description pt's mvmts are non-fluid in nature   Sensation X   Light Touch   RLE Light Touch Impaired   RLE Light Touch Comments Chronic numbness/tingling to bilateral feet   LLE Light Touch Impaired   LLE Light Touch Comments Chronic numbness/tingling to bilateral feet   Bed Mobility   Supine to Sit 5  Supervision   Additional items HOB elevated; Bedrails; Increased time required;Verbal cues   Transfers   Sit to Stand 4  Minimal assistance   Additional items Assist x 1; Increased time required;Verbal cues  (CGA)   Stand to Sit 4  Minimal assistance   Additional items Assist x 1; Impulsive;Verbal cues  (CGA)   Ambulation/Elevation   Gait pattern Improper Weight shift; Forward Flexion; Wide PARAM; Decreased foot clearance; Short stride   Gait Assistance 4  Minimal assist   Additional items Assist x 1;Verbal cues  (CGA)   Assistive Device Rolling walker   Distance 4'  (2' forward, 2' backward)   Stair Management Assistance Not tested   Balance   Static Sitting Fair -   Dynamic Sitting Fair -   Static Standing Poor +  (CGA w/ RW)   Dynamic Standing Poor +  (CGA w/ RW)   Ambulatory Poor +  (CGA w/ RW)   Endurance Deficit   Endurance Deficit Yes   Endurance Deficit Description pt w/ limited tolerance to physical activity and funcitonal mobility   Activity Tolerance   Activity Tolerance Patient limited by fatigue   Medical Staff Made Aware CHEY Saab arrived to pt's room at end of treatment session   Nurse Made Aware LEONIE Palma confirmed pt appropriate for PT eval; at end of eval, pt sitting at EOB in no apparent distress   Assessment   Prognosis Fair   Problem List Decreased strength;Decreased endurance; Impaired balance;Decreased mobility; Decreased coordination; Impaired sensation;Obesity   Assessment Radha Hernández is a 57 y o  Female who presents to [facility] on 8/7/2021 from home w/ c/o abdominal pain and vomiting and diagnosis of abdominal pain  Orders for PT eval and treat received w/ fall risk precautions  Pt presents w/ comorbidities of anemia, arthritis, CKS, DM, HTN and personal factors including: mobilizing w/ assistive device, inability to ambulate household distances, inability to perform IADLs and inability to perform ADLs  At baseline, reports independently performing transfers and ambulating short distances w/ RW and reports 0 falls in the last 6 months  Upon evaluation, pt presents w/ the following deficits: weakness, altered sensation, impaired coordination, impaired balance, decreased endurance and gait deviations  Pt requires supervision for bed mobility, Min A/CGAx1 for transfers, and Min A/CGAx1 for gait  Pt's clinical presentation is unstable/unpredictable due to need for assist w/ all phases of mobility when usually mobilizing independently, tolerance to only 4 feet of ambulation, need for supplemental oxygen in order to maintain oxygen saturation, gait deviations, decreased endurance, and need for input for mobility technique/safety   Pt is at an increased risk of falls due to impaired LE sensation, decreased endurance, impaired coordination, and impaired balance  Given the above findings, discharge recommendation is for Home w/ HHPT w/ 24/7 family support/supervision  During this admission, pt would benefit from skilled acute inpatient PT in order to address the abovementioned deficits to maximize function and mobility before DC from acute care  Goals   Patient Goals to go home   STG Expiration Date 08/22/21   Short Term Goal #1 Pt will: perform bed mobility independently to decrease caregiver burden; perform transfers independently to increase pt's OOB mobility; ambulate at least 100' w/ RW to increase pt's ambulatory endurance; increase LE strength by 1/2 grade to increase pt's tolerance to physical activity; increase all balance ratings by 1 grade to decrease pt's risk of falls   PT Treatment Day 1  (PT tx note below)   Plan   Treatment/Interventions Functional transfer training;LE strengthening/ROM; Therapeutic exercise; Endurance training;Patient/family training;Equipment eval/education; Bed mobility;Gait training   PT Frequency Other (Comment)  (3-5x/wk)   Recommendation   PT Discharge Recommendation Home with home health rehabilitation  (w/ 24/7 family support/supervision)   AM-PAC Basic Mobility Inpatient   Turning in Bed Without Bedrails 4   Lying on Back to Sitting on Edge of Flat Bed 3   Moving Bed to Chair 3   Standing Up From Chair 3   Walk in Room 3   Climb 3-5 Stairs 1  (pt does not have to perform steps at baseline)   Basic Mobility Inpatient Raw Score 17   Basic Mobility Standardized Score 39 67       The patient's AM-[facility] Basic Mobility Inpatient Short Form Raw Score is 17, Standardized Score is 39 67  A standardized score less than 42 9 suggests the patient may benefit from discharge to post-acute rehabilitation services  Please also refer to the recommendation of the Physical Therapist for safe discharge planning   Pt does not have to perform steps, has hospital bed, and assistance available 24/7        PHYSICAL THERAPY TREATMENT NOTE    Name: Shorty Yeboah  : 1960  Time in: 1008  Time out: 1018  Total treat time: 10 min    S: At end of eval; pt sitting at EOB in no apparent distress  Pt agreeable to further PT intervention consisting of bilateral LE therapeutic exercises    O: While sitting at EOB w/ supervision pt completed LAQ: 2x10, and ankle pumps 1x20  Pt able to maintain dynamic sitting balance w/ supervision and evidence of LOB  Pt then performed sit>supine bed mobility w/ supervision and increased time required  In supine, pt performed 1x10 hip abduction  A: Pt demonstrated ability to complete LE therapeutic exercises to increase tolerance to physical activity and improve LE strength w/o difficulty w/ VC for pacing  Pt continues to require supervision for bed mobility  At end of session, pt returned supine in bed w/ bed alarm on, all needs w/in reach, in no apparent distress  CHEY Clancy arrived to pt's room at end of session  Pt and pt's significant other confirmed they had no further questions or concerns regarding mobility at this time  P: Continue per evaluation above    Skilled inpatient PT is recommended during this admission in order to progress patient toward goals so patient is able to maximize independence    DC rec: home w/ HHPT and  family support/supervision   Pt's significant other confirmed she is home  and available to assist          Cj Reyes, PT, DPT  21

## 2021-08-12 NOTE — DISCHARGE SUMMARY
Discharge Summary - [facility] Internal Medicine    Patient Information: Ed Verde 57 y o  female MRN: [id number]  Unit/Bed#: -01 Encounter: [id number]    Discharging Physician / Practitioner: Gordon Sarmiento MD  PCP: Celio Gardner MD  Admission Date: 8/7/2021  Discharge Date: 08/12/21    Disposition:     Home    Reason for Admission:  Abdominal pain    Discharge Diagnoses:     Principal Problem:    Abdominal pain  Active Problems:    Anemia due to stage 3b chronic kidney disease (HCC)    Paroxysmal atrial fibrillation (HCC)    COPD with asthma ([facility] [address] [facility] )    Hypothyroid    Diabetes mellitus ([facility] )    Supratherapeutic INR  Resolved Problems:    * No resolved hospital problems  *      Consultations During Hospital Stay:  · Gastroenterology    Procedures Performed:     · EGD:  Mild gastritis and esophagitis  · Colonoscopy was no acute finding  · Push enteroscopy showed no acute findings   · Chest x-ray no active findings  · CT abdomen and pelvis no evidence of any acute abnormality in the abdomen and pelvis  Chronic large ventral hernia containing several loops of bowels without any evidence of incarceration  Hospital Course:     Ed Verde is a 57 y o  female patient who originally presented to the hospital on 8/7/2021 due to abdominal pain  Patient was subsequently admitted for evaluation  She had CT of the abdomen under results of which are noted above  She had GI workup including EGD colonoscopy and push enteroscopy which did not show any evidence of acute findings  She did have mild gastritis and esophagitis  She did have some microcytic anemia  Her symptoms have spontaneously resolved  She is being discharged home in stable condition  For ventral hernia she has was seen by surgeon in the past, given her comorbidities it was decided not to pursue any elective surgical intervention  Patient was encouraged to follow-up with a surgeon in outpatient setting      Condition at Discharge: good     Discharge Day Visit / Exam:     Subjective: The patient wants to go home  Tolerating diet and denies any abdominal pain  Vitals: Blood Pressure: 97/52 (08/12/21 0743)  Pulse: 74 (08/12/21 0742)  Temperature: (!) 97 2 °F (36 2 °C) (08/12/21 0742)  Temp Source: Tympanic (08/12/21 0742)  Respirations: 16 (08/12/21 0742)  Height: 5' 4" (162 6 cm) (08/09/21 0920)  Weight - Scale: 136 kg (300 lb) (08/09/21 0920)  SpO2: 99 % (08/12/21 0742)  Exam:   Physical Exam     Gen -Patient comfortable at rest   Neck- Supple  No thyromegaly or lymphadenopathy  Lungs-Clear bilaterally without any wheeze or rales   Heart S1-S2, regular rate and rhythm, no murmurs  Abdomen-ventral hernia without any signs of strangulation  Bowel sounds present abdomen soft  Extremities-no cyanosi,  clubbing or edema  Skin- no rash  Neuro-nonfocal     Discussion with Family: * family at bedside    Discharge instructions/Information to patient and family:   See after visit summary for information provided to patient and family  Provisions for Follow-Up Care:  See after visit summary for information related to follow-up care and any pertinent home health orders  Planned Readmission:      Discharge Statement:  I spent 35 minutes discharging the patient  This time was spent on the day of discharge  I had direct contact with the patient on the day of discharge  Greater than 50% of the total time was spent examining patient, answering all patient questions, arranging and discussing plan of care with patient as well as directly providing post-discharge instructions  Additional time then spent on discharge activities  Discharge Medications:  See after visit summary for reconciled discharge medications provided to patient and family        ** Please Note: This note has been constructed using a voice recognition system **

## 2021-08-13 ENCOUNTER — TELEPHONE (OUTPATIENT)
Dept: FAMILY MEDICINE CLINIC | Facility: CLINIC | Age: 61
End: 2021-08-13

## 2021-08-13 ENCOUNTER — TRANSITIONAL CARE MANAGEMENT (OUTPATIENT)
Dept: FAMILY MEDICINE CLINIC | Facility: CLINIC | Age: 61
End: 2021-08-13

## 2021-08-14 LAB
BACTERIA BLD CULT: NORMAL
BACTERIA BLD CULT: NORMAL

## 2021-08-15 ENCOUNTER — TELEPHONE (OUTPATIENT)
Dept: OTHER | Facility: OTHER | Age: 61
End: 2021-08-15

## 2021-08-15 NOTE — TELEPHONE ENCOUNTER
Brittanie Saxon calling from [facility]  Calling to let you know that she was admitted today  She has a few questions  When is her next INR due? What is the range of blood sugar and how often should she test? There is a level 2 reaction between Cymbalta and Warfarin

## 2021-08-16 DIAGNOSIS — E11.9 TYPE 2 DIABETES MELLITUS WITHOUT COMPLICATION, WITH LONG-TERM CURRENT USE OF INSULIN (HCC): Primary | ICD-10-CM

## 2021-08-16 DIAGNOSIS — Z79.4 TYPE 2 DIABETES MELLITUS WITHOUT COMPLICATION, WITH LONG-TERM CURRENT USE OF INSULIN (HCC): Primary | ICD-10-CM

## 2021-08-16 RX ORDER — PEN NEEDLE, DIABETIC 30 GX3/16"
NEEDLE, DISPOSABLE MISCELLANEOUS DAILY
Qty: 50 EACH | Refills: 5 | Status: SHIPPED | OUTPATIENT
Start: 2021-08-16

## 2021-08-16 RX ORDER — PEN NEEDLE, DIABETIC 30 GX3/16"
NEEDLE, DISPOSABLE MISCELLANEOUS DAILY
COMMUNITY
End: 2021-08-16 | Stop reason: SDUPTHER

## 2021-08-16 NOTE — TELEPHONE ENCOUNTER
Called to f/u spoke to partner  Testing is to be done daily, INR every two weeks, as per Dr Inocencio Garcia, ok with cymbalta and warfarin since we are monitoring it

## 2021-08-17 NOTE — QUICK NOTE
Sepsis, POA, likely due to UTI evidenced by Wbc 16 52;  Tachycardia () treated with IV Zosyn , now IV Rocephin, 1L NSS bolus, IVF , blood culture and lactic acid

## 2021-08-19 ENCOUNTER — OFFICE VISIT (OUTPATIENT)
Dept: FAMILY MEDICINE CLINIC | Facility: CLINIC | Age: 61
End: 2021-08-19
Payer: MEDICARE

## 2021-08-19 VITALS
WEIGHT: 293 LBS | OXYGEN SATURATION: 96 % | HEIGHT: 64 IN | RESPIRATION RATE: 18 BRPM | BODY MASS INDEX: 50.02 KG/M2 | SYSTOLIC BLOOD PRESSURE: 118 MMHG | HEART RATE: 84 BPM | TEMPERATURE: 99.1 F | DIASTOLIC BLOOD PRESSURE: 70 MMHG

## 2021-08-19 DIAGNOSIS — I48.0 PAROXYSMAL ATRIAL FIBRILLATION (HCC): Chronic | ICD-10-CM

## 2021-08-19 DIAGNOSIS — E03.9 HYPOTHYROIDISM, UNSPECIFIED TYPE: Chronic | ICD-10-CM

## 2021-08-19 DIAGNOSIS — K21.9 GASTROESOPHAGEAL REFLUX DISEASE, UNSPECIFIED WHETHER ESOPHAGITIS PRESENT: ICD-10-CM

## 2021-08-19 DIAGNOSIS — J44.9 COPD WITH ASTHMA (HCC): Chronic | ICD-10-CM

## 2021-08-19 DIAGNOSIS — K29.70 GASTRITIS: ICD-10-CM

## 2021-08-19 DIAGNOSIS — E66.01 MORBID (SEVERE) OBESITY DUE TO EXCESS CALORIES (HCC): ICD-10-CM

## 2021-08-19 DIAGNOSIS — E11.22 TYPE 2 DIABETES MELLITUS WITH STAGE 3 CHRONIC KIDNEY DISEASE, WITHOUT LONG-TERM CURRENT USE OF INSULIN, UNSPECIFIED WHETHER STAGE 3A OR 3B CKD (HCC): Chronic | ICD-10-CM

## 2021-08-19 DIAGNOSIS — N18.32 STAGE 3B CHRONIC KIDNEY DISEASE (HCC): ICD-10-CM

## 2021-08-19 DIAGNOSIS — N18.30 TYPE 2 DIABETES MELLITUS WITH STAGE 3 CHRONIC KIDNEY DISEASE, WITHOUT LONG-TERM CURRENT USE OF INSULIN, UNSPECIFIED WHETHER STAGE 3A OR 3B CKD (HCC): Chronic | ICD-10-CM

## 2021-08-19 DIAGNOSIS — I48.0 PAROXYSMAL ATRIAL FIBRILLATION (HCC): ICD-10-CM

## 2021-08-19 DIAGNOSIS — D50.9 IRON DEFICIENCY ANEMIA, UNSPECIFIED IRON DEFICIENCY ANEMIA TYPE: Primary | ICD-10-CM

## 2021-08-19 PROCEDURE — 99496 TRANSJ CARE MGMT HIGH F2F 7D: CPT | Performed by: INTERNAL MEDICINE

## 2021-08-19 RX ORDER — METOPROLOL TARTRATE 50 MG/1
50 TABLET, FILM COATED ORAL 2 TIMES DAILY
Qty: 180 TABLET | Refills: 1 | Status: SHIPPED | OUTPATIENT
Start: 2021-08-19 | End: 2021-11-18 | Stop reason: SDUPTHER

## 2021-08-19 RX ORDER — PANTOPRAZOLE SODIUM 40 MG/1
40 TABLET, DELAYED RELEASE ORAL DAILY
Qty: 90 TABLET | Refills: 1 | Status: SHIPPED | OUTPATIENT
Start: 2021-08-19 | End: 2021-11-18 | Stop reason: SDUPTHER

## 2021-08-19 RX ORDER — OMEGA-3S/DHA/EPA/FISH OIL/D3 300MG-1000
400 CAPSULE ORAL DAILY
Qty: 90 TABLET | Refills: 1 | Status: SHIPPED | OUTPATIENT
Start: 2021-08-19 | End: 2022-04-25 | Stop reason: SDUPTHER

## 2021-08-19 NOTE — PROGRESS NOTES
Assessment/Plan:    COPD   Continue albuterol, ipratropium neb and inhaler  Anemia  Likely multifactorial  May be due to CKD  Severe anemia in the hospital requiring blood transfusion  EGD  With push enteroscopy was unremarkable, Colonoscopy showed sigmoid diverticulosis  No obvious source of bleed  Check CBC, Iron panel  Atrial fibrillation  Continue metoprolol 50 bid  Dose recently   Continue coumadin 1 mg daily  Check INR  DM2  Off medications since 3 yrs ago  Diabetic diet  Check Hb A1c  Advised on weight loss  Hypothyroidism  Continue synthroid 100mcg daily  GERD  Stable  Continue protonix 40mg daily  sNo problem-specific Assessment & Plan notes found for this encounter  Diagnoses and all orders for this visit:    Iron deficiency anemia, unspecified iron deficiency anemia type  -     Iron Panel (Includes Ferritin, Iron Sat%, Iron, and TIBC); Future    Hypothyroidism, unspecified type    Gastroesophageal reflux disease, unspecified whether esophagitis present    COPD with asthma ([facility] [address] )    Paroxysmal atrial fibrillation ([facility] )  -     Protime-INR; Future    Morbid (severe) obesity due to excess calories ([facility] )    Type 2 diabetes mellitus with stage 3 chronic kidney disease, without long-term current use of insulin, unspecified whether stage 3a or 3b CKD (HCC)  -     CBC (Includes Diff/Plt) (Refl); Future  -     Basic metabolic panel; Future  -     Hemoglobin A1C; Future          Subjective:      Patient ID: Virgilio Johnston is a 57 y o  female with PMH of afib on coumadin,COPD, DM2 , Obesity here  For hospital follow up  HPI:    Was discharged on 8/12 where he was admitted for abdominal pain thought to be secondary to UTI  Abd pain resolved with antibiotics  Also had severe anemia requiring Packed cells transfusion and FFP for supratherapeutic INR  EGD  With push enteroscopy was unremarkable, Colonoscopy showed sigmoid diverticulosis  No obvious source of bleed       She is feeling better, reports SOBE, non at rest , occasional palpitations, but denied cp, n/v, diaphoresis  The following portions of the patient's history were reviewed and updated as appropriate: allergies, current medications, past family history, past medical history, past social history, past surgical history and problem list     Review of Systems   Constitutional: Negative for chills, diaphoresis and fever  HENT: Negative for congestion and nosebleeds  Eyes: Negative for visual disturbance  Respiratory: Positive for shortness of breath (on exertion)  Negative for cough and chest tightness  Cardiovascular: Positive for palpitations (occasional)  Negative for chest pain and leg swelling  Gastrointestinal: Negative for abdominal pain, blood in stool, constipation, nausea and vomiting  Endocrine: Negative for polyuria  Genitourinary: Negative for difficulty urinating, dysuria, frequency, hematuria and urgency  Musculoskeletal: Negative for arthralgias and back pain  Neurological: Negative for dizziness, speech difficulty and headaches  Hematological: Does not bruise/bleed easily  Psychiatric/Behavioral: Negative for confusion  Objective:      /70 (BP Location: Left arm, Patient Position: Sitting, Cuff Size: Standard)   Pulse 84   Temp 99 1 °F (37 3 °C) (Tympanic)   Resp 18   Ht 5' 4" (1 626 m)   Wt 136 kg (300 lb) Comment: stated  SpO2 96%   BMI 51 49 kg/m²          Physical Exam  Vitals reviewed  Constitutional:       General: She is not in acute distress  Appearance: Normal appearance  She is not ill-appearing, toxic-appearing or diaphoretic  HENT:      Head: Normocephalic and atraumatic  Nose: No congestion  Mouth/Throat:      Mouth: Mucous membranes are moist    Eyes:      General: No scleral icterus  Extraocular Movements: Extraocular movements intact  Pupils: Pupils are equal, round, and reactive to light     Neck:      Vascular: No carotid bruit  Cardiovascular:      Rate and Rhythm: Normal rate  Rhythm irregular  Heart sounds: No murmur heard  No friction rub  No gallop  Pulmonary:      Effort: Pulmonary effort is normal       Breath sounds: Normal breath sounds  No stridor  No wheezing, rhonchi or rales  Chest:      Chest wall: No tenderness  Abdominal:      General: Abdomen is flat  There is no distension  Palpations: Abdomen is soft  There is no mass  Tenderness: There is no abdominal tenderness  There is no right CVA tenderness, left CVA tenderness, guarding or rebound  Hernia: No hernia is present  Musculoskeletal:         General: Normal range of motion  Cervical back: Normal range of motion and neck supple  Right lower leg: No edema  Left lower leg: No edema  Lymphadenopathy:      Cervical: No cervical adenopathy  Skin:     Capillary Refill: Capillary refill takes less than 2 seconds  Neurological:      General: No focal deficit present  Mental Status: She is alert and oriented to person, place, and time     Psychiatric:         Mood and Affect: Mood normal          Behavior: Behavior normal

## 2021-08-23 DIAGNOSIS — Z79.4 TYPE 2 DIABETES MELLITUS WITHOUT COMPLICATION, WITH LONG-TERM CURRENT USE OF INSULIN (HCC): Primary | ICD-10-CM

## 2021-08-23 DIAGNOSIS — E11.9 TYPE 2 DIABETES MELLITUS WITHOUT COMPLICATION, WITH LONG-TERM CURRENT USE OF INSULIN (HCC): Primary | ICD-10-CM

## 2021-08-23 LAB
INR PPP: 1
PROTHROMBIN TIME: 10.5 SEC (ref 9–11.5)

## 2021-08-23 RX ORDER — BLOOD-GLUCOSE METER
EACH MISCELLANEOUS 2 TIMES DAILY
Qty: 100 EACH | Refills: 5 | Status: SHIPPED | OUTPATIENT
Start: 2021-08-23

## 2021-08-23 RX ORDER — BLOOD-GLUCOSE METER
EACH MISCELLANEOUS 2 TIMES DAILY
COMMUNITY
End: 2021-08-23 | Stop reason: SDUPTHER

## 2021-08-24 ENCOUNTER — ANTICOAG VISIT (OUTPATIENT)
Dept: FAMILY MEDICINE CLINIC | Facility: CLINIC | Age: 61
End: 2021-08-24

## 2021-08-24 NOTE — PROGRESS NOTES
Called, spoke to shaun,  As per Dr Audrey Salinas take 5mg today and tomorrow, repeat INR in one week

## 2021-08-31 ENCOUNTER — ANTICOAG VISIT (OUTPATIENT)
Dept: FAMILY MEDICINE CLINIC | Facility: CLINIC | Age: 61
End: 2021-08-31

## 2021-08-31 LAB
INR PPP: 1.9
PROTHROMBIN TIME: 19.3 SEC (ref 9–11.5)

## 2021-09-08 ENCOUNTER — TELEPHONE (OUTPATIENT)
Dept: FAMILY MEDICINE CLINIC | Facility: CLINIC | Age: 61
End: 2021-09-08

## 2021-09-08 NOTE — TELEPHONE ENCOUNTER
Alessandra Rae from [address] [place] [address], [id number] called and left msg stating that Ye Lassiter would like to be discharged from home health

## 2021-09-09 ENCOUNTER — ANTICOAG VISIT (OUTPATIENT)
Dept: FAMILY MEDICINE CLINIC | Facility: CLINIC | Age: 61
End: 2021-09-09

## 2021-09-09 LAB
INR PPP: 3.1
PROTHROMBIN TIME: 29.5 SEC (ref 9–11.5)

## 2021-09-23 LAB
INR PPP: 2.6
PROTHROMBIN TIME: 26.4 SEC (ref 9–11.5)

## 2021-09-27 ENCOUNTER — ANTICOAG VISIT (OUTPATIENT)
Dept: FAMILY MEDICINE CLINIC | Facility: CLINIC | Age: 61
End: 2021-09-27

## 2021-09-30 DIAGNOSIS — F41.0 PANIC ATTACK: ICD-10-CM

## 2021-10-03 RX ORDER — LORAZEPAM 0.5 MG/1
TABLET ORAL
Qty: 30 TABLET | Refills: 2 | Status: SHIPPED | OUTPATIENT
Start: 2021-10-03 | End: 2021-11-18 | Stop reason: SDUPTHER

## 2021-10-18 ENCOUNTER — ANTICOAG VISIT (OUTPATIENT)
Dept: FAMILY MEDICINE CLINIC | Facility: CLINIC | Age: 61
End: 2021-10-18

## 2021-10-18 LAB
INR PPP: 2.3
PROTHROMBIN TIME: 23 SEC (ref 9–11.5)

## 2021-10-19 ENCOUNTER — TELEPHONE (OUTPATIENT)
Dept: NEPHROLOGY | Facility: CLINIC | Age: 61
End: 2021-10-19

## 2021-10-19 LAB
BUN SERPL-MCNC: 25 MG/DL (ref 7–25)
BUN/CREAT SERPL: 16 (CALC) (ref 6–22)
CALCIUM SERPL-MCNC: 9.3 MG/DL (ref 8.6–10.4)
CHLORIDE SERPL-SCNC: 102 MMOL/L (ref 98–110)
CO2 SERPL-SCNC: 34 MMOL/L (ref 20–32)
CREAT SERPL-MCNC: 1.53 MG/DL (ref 0.5–0.99)
GLUCOSE SERPL-MCNC: 112 MG/DL (ref 65–139)
MAGNESIUM SERPL-MCNC: 2 MG/DL (ref 1.5–2.5)
POTASSIUM SERPL-SCNC: 5 MMOL/L (ref 3.5–5.3)
SL AMB EGFR AFRICAN AMERICAN: 42 ML/MIN/1.73M2
SL AMB EGFR NON AFRICAN AMERICAN: 36 ML/MIN/1.73M2
SODIUM SERPL-SCNC: 140 MMOL/L (ref 135–146)

## 2021-11-01 DIAGNOSIS — E11.9 TYPE 2 DIABETES MELLITUS WITHOUT COMPLICATION, WITH LONG-TERM CURRENT USE OF INSULIN (HCC): ICD-10-CM

## 2021-11-01 DIAGNOSIS — Z79.4 TYPE 2 DIABETES MELLITUS WITHOUT COMPLICATION, WITH LONG-TERM CURRENT USE OF INSULIN (HCC): ICD-10-CM

## 2021-11-18 ENCOUNTER — OFFICE VISIT (OUTPATIENT)
Dept: FAMILY MEDICINE CLINIC | Facility: CLINIC | Age: 61
End: 2021-11-18
Payer: MEDICARE

## 2021-11-18 VITALS
TEMPERATURE: 98.5 F | RESPIRATION RATE: 16 BRPM | HEART RATE: 76 BPM | SYSTOLIC BLOOD PRESSURE: 128 MMHG | HEIGHT: 64 IN | OXYGEN SATURATION: 97 % | BODY MASS INDEX: 50.02 KG/M2 | WEIGHT: 293 LBS | DIASTOLIC BLOOD PRESSURE: 76 MMHG

## 2021-11-18 DIAGNOSIS — E11.9 TYPE 2 DIABETES MELLITUS WITHOUT COMPLICATION, WITH LONG-TERM CURRENT USE OF INSULIN (HCC): ICD-10-CM

## 2021-11-18 DIAGNOSIS — E11.49 OTHER DIABETIC NEUROLOGICAL COMPLICATION ASSOCIATED WITH TYPE 2 DIABETES MELLITUS (HCC): ICD-10-CM

## 2021-11-18 DIAGNOSIS — I48.11 LONGSTANDING PERSISTENT ATRIAL FIBRILLATION (HCC): ICD-10-CM

## 2021-11-18 DIAGNOSIS — K29.70 GASTRITIS: ICD-10-CM

## 2021-11-18 DIAGNOSIS — F41.0 PANIC ATTACK: ICD-10-CM

## 2021-11-18 DIAGNOSIS — Z79.4 TYPE 2 DIABETES MELLITUS WITHOUT COMPLICATION, WITH LONG-TERM CURRENT USE OF INSULIN (HCC): ICD-10-CM

## 2021-11-18 DIAGNOSIS — Z23 NEED FOR VACCINATION: Primary | ICD-10-CM

## 2021-11-18 DIAGNOSIS — E78.5 DYSLIPIDEMIA: ICD-10-CM

## 2021-11-18 DIAGNOSIS — J44.9 COPD WITH ASTHMA (HCC): ICD-10-CM

## 2021-11-18 DIAGNOSIS — I10 ESSENTIAL HYPERTENSION: ICD-10-CM

## 2021-11-18 DIAGNOSIS — E03.9 HYPOTHYROIDISM, UNSPECIFIED TYPE: ICD-10-CM

## 2021-11-18 DIAGNOSIS — E78.2 MIXED HYPERLIPIDEMIA: ICD-10-CM

## 2021-11-18 DIAGNOSIS — I48.0 PAROXYSMAL ATRIAL FIBRILLATION (HCC): ICD-10-CM

## 2021-11-18 PROCEDURE — 99214 OFFICE O/P EST MOD 30 MIN: CPT

## 2021-11-18 PROCEDURE — G0008 ADMIN INFLUENZA VIRUS VAC: HCPCS

## 2021-11-18 PROCEDURE — 90682 RIV4 VACC RECOMBINANT DNA IM: CPT

## 2021-11-18 RX ORDER — PANTOPRAZOLE SODIUM 40 MG/1
40 TABLET, DELAYED RELEASE ORAL DAILY
Qty: 90 TABLET | Refills: 1 | Status: SHIPPED | OUTPATIENT
Start: 2021-11-18 | End: 2022-03-03 | Stop reason: SDUPTHER

## 2021-11-18 RX ORDER — LORAZEPAM 0.5 MG/1
0.5 TABLET ORAL EVERY 8 HOURS PRN
Qty: 30 TABLET | Refills: 2 | Status: SHIPPED | OUTPATIENT
Start: 2021-11-18 | End: 2022-03-02 | Stop reason: SDUPTHER

## 2021-11-18 RX ORDER — ATORVASTATIN CALCIUM 10 MG/1
10 TABLET, FILM COATED ORAL DAILY
Qty: 90 TABLET | Refills: 1 | Status: SHIPPED | OUTPATIENT
Start: 2021-11-18 | End: 2022-03-03 | Stop reason: SDUPTHER

## 2021-11-18 RX ORDER — ALBUTEROL SULFATE 90 UG/1
2 AEROSOL, METERED RESPIRATORY (INHALATION) EVERY 4 HOURS PRN
Qty: 8.5 G | Refills: 2 | Status: SHIPPED | OUTPATIENT
Start: 2021-11-18 | End: 2022-07-12

## 2021-11-18 RX ORDER — WARFARIN SODIUM 3 MG/1
3 TABLET ORAL
Qty: 90 TABLET | Refills: 1 | Status: SHIPPED | OUTPATIENT
Start: 2021-11-18 | End: 2022-03-03 | Stop reason: SDUPTHER

## 2021-11-18 RX ORDER — METOPROLOL TARTRATE 50 MG/1
50 TABLET, FILM COATED ORAL 2 TIMES DAILY
Qty: 180 TABLET | Refills: 1 | Status: SHIPPED | OUTPATIENT
Start: 2021-11-18 | End: 2022-03-03 | Stop reason: SDUPTHER

## 2021-11-18 RX ORDER — DULOXETIN HYDROCHLORIDE 30 MG/1
30 CAPSULE, DELAYED RELEASE ORAL 2 TIMES DAILY
Qty: 180 CAPSULE | Refills: 1 | Status: SHIPPED | OUTPATIENT
Start: 2021-11-18 | End: 2022-03-03 | Stop reason: SDUPTHER

## 2021-11-30 DIAGNOSIS — Z79.4 TYPE 2 DIABETES MELLITUS WITHOUT COMPLICATION, WITH LONG-TERM CURRENT USE OF INSULIN (HCC): Primary | ICD-10-CM

## 2021-11-30 DIAGNOSIS — E11.9 TYPE 2 DIABETES MELLITUS WITHOUT COMPLICATION, WITH LONG-TERM CURRENT USE OF INSULIN (HCC): Primary | ICD-10-CM

## 2021-11-30 RX ORDER — BLOOD-GLUCOSE METER
EACH MISCELLANEOUS 2 TIMES DAILY
Qty: 1 EACH | Refills: 0 | Status: SHIPPED | OUTPATIENT
Start: 2021-11-30

## 2021-12-01 DIAGNOSIS — Z79.4 TYPE 2 DIABETES MELLITUS WITHOUT COMPLICATION, WITH LONG-TERM CURRENT USE OF INSULIN (HCC): ICD-10-CM

## 2021-12-01 DIAGNOSIS — E11.9 TYPE 2 DIABETES MELLITUS WITHOUT COMPLICATION, WITH LONG-TERM CURRENT USE OF INSULIN (HCC): ICD-10-CM

## 2021-12-02 RX ORDER — BLOOD-GLUCOSE METER
EACH MISCELLANEOUS
Qty: 100 STRIP | Refills: 5 | Status: SHIPPED | OUTPATIENT
Start: 2021-12-02

## 2021-12-27 DIAGNOSIS — K21.9 GASTROESOPHAGEAL REFLUX DISEASE WITHOUT ESOPHAGITIS: ICD-10-CM

## 2021-12-27 RX ORDER — FAMOTIDINE 20 MG/1
TABLET, FILM COATED ORAL
Qty: 90 TABLET | Refills: 1 | Status: SHIPPED | OUTPATIENT
Start: 2021-12-27 | End: 2022-03-03 | Stop reason: SDUPTHER

## 2022-01-31 ENCOUNTER — TELEPHONE (OUTPATIENT)
Dept: NEPHROLOGY | Facility: CLINIC | Age: 62
End: 2022-01-31

## 2022-01-31 NOTE — TELEPHONE ENCOUNTER
----- Message from Juan Diego Storey MD sent at 1/31/2022  9:27 AM EST -----  The patient needs lab work as ordered prior to her appointment coming up  She also needs to bring in a week a blood pressure readings morning evening with just sitting is great  And her blood pressure machine at the visit  Thank you so much

## 2022-01-31 NOTE — PROGRESS NOTES
RENAL FOLLOW UP NOTE: td     ASSESSMENT AND PLAN:  None at this time  1   CKD stage 3 :  · Etiology:  Right radical nephrectomy July 15, 2016 for renal cell CA/hypertensive nephrosclerosis/arteriolar nephrosclerosis/diabetic nephropathy/?  Morbid obesity with?  FSGS  · Baseline creatinine:  new baseline appears to be from 1 8-as high as 2 4  · Current creatinine:  1 71 at baseline from 05/13/2020  · Urine protein creatinine ratio:  0 36 g at goal  · UA microscopic no proteinuria, trace intact heme from 05/13/2020  Recommendations:  · Treat hypertension-please see below  · Treat dyslipidemia-please see below  · Maintain proteinuria less than 1 g or as low as possible  · Avoid nephrotoxic agents such as NSAIDs, patient counseled as such  2   Volume:  · Current volume:  Euvolemic  · Treatment:  · Only use as needed furosemide if swelling recurs  3   Hypertension:   Home blood pressures:    A m :  130/64  P m :  148/75  Heart rate:     · Goal blood pressure:  Less than 130/80  Recommendations:  ·   push nonmedical regimen including weight loss, and any form of exercise patient can not tolerate; avoidance of salt; patient counseled as such  · Medication changes today:  · Amlodipine 5 mg in a m    · Recheck blood pressures in about 4-6 weeks  4   Electrolytes:  Chronic metabolic alkalosis most likely from primary CO2 retention,, doing well with a bicarbonate of 31  5   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:  · Calcium/magnesium/phosphorus:  All acceptable  · PTH intact:  73 at goal  · Vitamin-D:  47 at goal  6   Dyslipidemia:  · Goal LDL:  Less than 100  · Current lipid profile:  None at this time     Recommendations:  TO OBTAIN LIPID PROFILE  7   Anemia:  Current improved and doing well at 12 9 05/13/2021  -iron studies:  Saturation 25% ferritin 68 but cannot tolerate oral iron because of GI side effects  -B12 and folate deficiency being treated  -multiple myeloma was ruled out in January  -GI evaluation in January of 2018:   colonoscopy demonstrated internal/external hemorrhoids; also colonic polyps which were removed  Susanne Jimenes was a suboptimal colonic prep   Felt she may require repeat colonoscopy in 3-6 months  EGD demonstrated small hiatal hernia  Recommendations:  -GI follow-up with Dr Garza     8   Other problems:  · Right radical nephrectomy for renal cell CA July 15, 2016  · 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 2, 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  · Morbid obesity  · Atrial fibrillation  · Cellulitis of the left leg back in January treated with intravenous antibiotics  · COPD on iron  · EGD involving multiple joints  · Hypothyroidism on supplement  · History of gait dysfunction using motorized scooter to ambulate  · Diabetes mellitus/diabetic neuropathy  · Admission 8/2021 for abdominal pain-workup is CT/EGD/colonoscopy/push enteroscopy all negative except for gastritis-esophagitis; the patient's symptoms resolved spontaneously  Patient with a ventral hernia which she deferred in terms of surgical revision, will follow-up with surgery       GI health maintenance:  Please see above under other problems  PATIENT INSTRUCTIONS:    Patient Instructions   1  Medication changes today:   Please begin amlodipine 5 mg daily in the morning  Please watch for leg swelling and call if you developed any    2  Please go for  fasting  lab work AT [address] [place] [address]    3   Please take 1 week a blood pressure readings  4-6 weeks after making the above medication change     AS FOLLOWS  MORNING AND EVENING, SITTING  as follows:  · TAKE THE MORNING READINGS BEFORE ANY MEDICATIONS AND WHEN YOU ARE RELAXED FOR SEVERAL MINUTES  · TAKE THE EVENING READINGS:  BETWEEN 7-10 P M ; PRIOR TO ANY MEDICATIONS; AT LEAST IN OUR  FROM DINNER; AND CERTAINLY AFTER RELAXING FOR A FEW MINUTES  · PLEASE INCLUDE HEART RATE WITH YOUR BLOOD PRESSURE READINGS  · When taking standing readings, keep your arm supported at heart level and not dangling  · Make sure you are sitting with your back supported and feet on the ground and do not cross your legs or feet  · Make sure you have not taken any coffee or caffeine products or exercised or smoke cigarettes at least 30 minutes before taking your blood pressure  Then please mail these readings into the office    4  Follow-up in 4  months   Please bring in 1 week a blood pressure readings morning evening, sitting is outlined above   PLEASE BRING AN YOUR BLOOD PRESSURE MACHINE TO CORRELATE WITH THE OFFICE MACHINE AT THIS NEXT SCHEDULED VISIT   Please go for fasting lab work 1-2 weeks prior to your appointment      5  General instructions:   AVOID SALT BUT NOT ADDING AN READING LABELS TO MAKE SURE THERE IS LOW-SALT IN THE FOOD THAT YOU ARE EATING  o Goal is less than 2 g of sodium intake or less than 5 g of sodium chloride intake per day     Avoid nonsteroidal anti-inflammatory drugs such as Naprosyn, ibuprofen, Aleve, Advil, Celebrex, Meloxicam (Mobic) etc   You can use Tylenol as needed if you do not have any liver condition to be concerned about     Avoid medications such as Sudafed or decongestants and antihistamines that contained the D component which is the decongestant  You can take antihistamines without the decongestant or D component   Try to keep as active as possible     Try to lose 10 lb by your next visit     Please do not drink more than 2 glasses of alcohol/wine on a daily basis as this may contribute to your high blood pressure  Subjective: There has been no hospitalizations or acute illnesses since last visit  The patient overall is feeling well  No fevers, chills, or colds    Chronic mild cough from her nebulizer which is non productive  Good appetite and good energy  No hematuria, dysuria, voiding symptoms or foamy urine  No gastrointestinal symptoms except for constipation  No chest pain, chronic dyspnea on exertion on oxygen, no edema  No headaches, dizziness or lightheadedness  Blood pressure medications:   Metoprolol tartrate 50 mg twice a day      ROS:  See HPI, otherwise review of systems as completely reviewed with the patient are negative    Past Medical History:   Diagnosis Date    Anemia     Arthritis     [facility]     Chronic kidney disease     Diabetes mellitus [facility]     Disease of thyroid gland     HLD (hyperlipidemia)     Hypertension     [facility]     Pneumonia      Past Surgical History:   Procedure Laterality Date    CHOLECYSTECTOMY      CT GUIDED PERC DRAINAGE CATHETER PLACEMENT  5/16/2016    GALLBLADDER SURGERY  05/01/2004    HYSTERECTOMY  06/01/2018    NEPHRECTOMY Right 08/02/2018     Family History   Problem Relation Age of Onset    No Known Problems Mother     No Known Problems Father       reports that she quit smoking about 11 years ago  She has a 90 00 pack-year smoking history  She quit smokeless tobacco use about 10 years ago  She reports current drug use  Drug: Marijuana  She reports that she does not drink alcohol  I COMPLETELY REVIEWED THE PAST MEDICAL HISTORY/PAST SURGICAL HISTORY/SOCIAL HISTORY/FAMILY HISTORY/AND MEDICATIONS  AND UPDATED ALL    Objective:     Vitals:   BP sitting on left:  120/68 with a heart rate 80 and slightly irregular      Weight (last 2 days)     Date/Time Weight    02/08/22 1133 --     Comments:   Weight: patient unable to get on scale at 02/08/22 1133       Wt Readings from Last 3 Encounters:   11/18/21 (!) 140 kg (308 lb)   08/19/21 136 kg (300 lb)   08/09/21 136 kg (300 lb)       Body mass index is 52 87 kg/m²      Physical Exam: General:  No acute distress/on oxygen/morbidly obese  Skin:  No acute rash  Eyes:  No scleral icterus, noninjected, no discharge from eyes  ENT:  Moist mucous membranes  Neck:  Supple, no jugular venous distention, trachea is midline, no lymphadenopathy and no thyromegaly  Back   No CVAT  Chest:  Clear to auscultation and percussion, good respiratory effort  CVS:  Regular rate and rhythm without a rub, or gallops or murmurs  Abdomen:  Obese,Soft and nontender with normal bowel sounds  Extremities:  No cyanosis and no edema/venous stasis changes, no arthritic changes, normal range of motion  Neuro:  Grossly intact  Psych:  Alert, oriented x3 and appropriate      Medications:    Current Outpatient Medications:     albuterol (2 5 mg/3 mL) 0 083 % nebulizer solution, INHALE CONTENTS OF 1 VIAL ( 3 MILLILITERS ) IN NEBULIZER BY MOUTH AND INTO THE LUNGS EVERY 6 HOURS IF NEEDED FOR WHEEZING OR SHORTNESS OF BREATH, Disp: 75 mL, Rfl: 5    albuterol (PROVENTIL HFA,VENTOLIN HFA) 90 mcg/act inhaler, Inhale 2 puffs every 4 (four) hours as needed for wheezing, Disp: 8 5 g, Rfl: 2    atorvastatin (LIPITOR) 10 mg tablet, Take 1 tablet (10 mg total) by mouth daily, Disp: 90 tablet, Rfl: 1    Blood Glucose Monitoring Suppl ([employer]erace Pro Glucose Meter) SAVANNAH, Use 2 (two) times a day Embrace glucometer, test twice daily, Disp: 1 each, Rfl: 0    cholecalciferol (VITAMIN D3) 400 units tablet, Take 1 tablet (400 Units total) by mouth daily, Disp: 90 tablet, Rfl: 1    DULoxetine (CYMBALTA) 30 mg delayed release capsule, Take 1 capsule (30 mg total) by mouth 2 (two) times a day, Disp: 180 capsule, Rfl: 1    Embrace Lancets Ultra Thin 30G MISC, Use 2 (two) times a day, Disp: 100 each, Rfl: 5    ferrous sulfate 325 (65 Fe) mg tablet, Take 325 mg by mouth daily with breakfast , Disp: , Rfl:     glucose blood (Embrace Pro Glucose Test) test strip, PLEASE RE-WRITE RX FOR EMBRACE PRO **EMBRACE REGUALR IS NOT AVAILABLE**  ---TEST TWICE DAILY--, Disp: 100 strip, Rfl: 5    glucose blood test strip, Use 1 each daily Embrace test strips, test daily, Disp: 50 strip, Rfl: 5    Insulin Pen Needle (Pen Needles) 32G X 4 MM MISC, Use daily Embrace pen needles, test daily, Disp: 50 each, Rfl: 5    levothyroxine 100 mcg tablet, Take 1 tablet (100 mcg total) by mouth daily in the early morning, Disp: 90 tablet, Rfl: 3    LORazepam (ATIVAN) 0 5 mg tablet, Take 1 tablet (0 5 mg total) by mouth every 8 (eight) hours as needed for anxiety, Disp: 30 tablet, Rfl: 2    metoprolol tartrate (LOPRESSOR) 50 mg tablet, Take 1 tablet (50 mg total) by mouth 2 (two) times a day, Disp: 180 tablet, Rfl: 1    pantoprazole (PROTONIX) 40 mg tablet, Take 1 tablet (40 mg total) by mouth daily, Disp: 90 tablet, Rfl: 1    vitamin B-12 (VITAMIN B-12) 500 mcg tablet, Take 1 tablet (500 mcg total) by mouth daily, Disp: 90 tablet, Rfl: 3    warfarin (Coumadin) 3 mg tablet, Take 1 tablet (3 mg total) by mouth daily, Disp: 90 tablet, Rfl: 1    amLODIPine (NORVASC) 5 mg tablet, Take 1 tablet (5 mg total) by mouth daily, Disp: 30 tablet, Rfl: 5    famotidine (PEPCID) 20 mg tablet, take 1 tablet by mouth once daily (Patient not taking: Reported on 2/8/2022), Disp: 90 tablet, Rfl: 1    Lab, Imaging and other studies: I have personally reviewed pertinent labs  Laboratory Results:  Results for orders placed or performed in visit on 10/18/21   Magnesium   Result Value Ref Range    Magnesium, Serum 2 0 1 5 - 2 5 mg/dL   Basic metabolic panel   Result Value Ref Range    Glucose, Random 112 65 - 139 mg/dL    BUN 25 7 - 25 mg/dL    Creatinine 1 53 (H) 0 50 - 0 99 mg/dL    eGFR Non  36 (L) > OR = 60 mL/min/1 73m2    eGFR  42 (L) > OR = 60 mL/min/1 73m2    SL AMB BUN/CREATININE RATIO 16 6 - 22 (calc)    Sodium 140 135 - 146 mmol/L    Potassium 5 0 3 5 - 5 3 mmol/L    Chloride 102 98 - 110 mmol/L    CO2 34 (H) 20 - 32 mmol/L    Calcium 9 3 8 6 - 10 4 mg/dL             Invalid input(s): ALBUMIN      Radiology review:   chest X-ray    Ultrasound      Portions of the record may have been created with voice recognition software  Occasional wrong word or "sound a like" substitutions may have occurred due to the inherent limitations of voice recognition software    Read the chart carefully and recognize, using context, where substitutions have occurred

## 2022-02-08 ENCOUNTER — OFFICE VISIT (OUTPATIENT)
Dept: NEPHROLOGY | Facility: CLINIC | Age: 62
End: 2022-02-08
Payer: MEDICARE

## 2022-02-08 VITALS — BODY MASS INDEX: 52.87 KG/M2 | HEIGHT: 64 IN

## 2022-02-08 DIAGNOSIS — D63.1 ANEMIA DUE TO STAGE 3B CHRONIC KIDNEY DISEASE (HCC): ICD-10-CM

## 2022-02-08 DIAGNOSIS — E61.1 IRON DEFICIENCY: ICD-10-CM

## 2022-02-08 DIAGNOSIS — N18.32 STAGE 3B CHRONIC KIDNEY DISEASE (HCC): ICD-10-CM

## 2022-02-08 DIAGNOSIS — N18.32 ANEMIA DUE TO STAGE 3B CHRONIC KIDNEY DISEASE (HCC): ICD-10-CM

## 2022-02-08 DIAGNOSIS — E78.5 DYSLIPIDEMIA: ICD-10-CM

## 2022-02-08 DIAGNOSIS — R80.1 PERSISTENT PROTEINURIA: ICD-10-CM

## 2022-02-08 DIAGNOSIS — N25.81 SECONDARY HYPERPARATHYROIDISM OF RENAL ORIGIN (HCC): ICD-10-CM

## 2022-02-08 DIAGNOSIS — Z90.5 H/O RIGHT NEPHRECTOMY: ICD-10-CM

## 2022-02-08 DIAGNOSIS — E66.01 MORBID (SEVERE) OBESITY DUE TO EXCESS CALORIES (HCC): ICD-10-CM

## 2022-02-08 DIAGNOSIS — I12.9 HYPERTENSIVE CHRONIC KIDNEY DISEASE WITH STAGE 1 THROUGH STAGE 4 CHRONIC KIDNEY DISEASE, OR UNSPECIFIED CHRONIC KIDNEY DISEASE: Primary | ICD-10-CM

## 2022-02-08 PROCEDURE — 99214 OFFICE O/P EST MOD 30 MIN: CPT | Performed by: INTERNAL MEDICINE

## 2022-02-08 RX ORDER — AMLODIPINE BESYLATE 5 MG/1
5 TABLET ORAL DAILY
Qty: 30 TABLET | Refills: 5 | Status: SHIPPED | OUTPATIENT
Start: 2022-02-08 | End: 2022-07-25

## 2022-02-08 NOTE — PATIENT INSTRUCTIONS
1  Medication changes today:   Please begin amlodipine 5 mg daily in the morning  Please watch for leg swelling and call if you developed any    2  Please go for  fasting  lab work AT [address] [facility]    3  Please take 1 week a blood pressure readings  4-6 weeks after making the above medication change     AS FOLLOWS  MORNING AND EVENING, SITTING  as follows:  · TAKE THE MORNING READINGS BEFORE ANY MEDICATIONS AND WHEN YOU ARE RELAXED FOR SEVERAL MINUTES  · TAKE THE EVENING READINGS:  BETWEEN 7-10 P M ; PRIOR TO ANY MEDICATIONS; AT LEAST IN OUR  FROM DINNER; AND CERTAINLY AFTER RELAXING FOR A FEW MINUTES  · PLEASE INCLUDE HEART RATE WITH YOUR BLOOD PRESSURE READINGS  · When taking standing readings, keep your arm supported at heart level and not dangling  · Make sure you are sitting with your back supported and feet on the ground and do not cross your legs or feet  · Make sure you have not taken any coffee or caffeine products or exercised or smoke cigarettes at least 30 minutes before taking your blood pressure  Then please mail these readings into the office    4  Follow-up in 4  months   Please bring in 1 week a blood pressure readings morning evening, sitting is outlined above   PLEASE BRING AN YOUR BLOOD PRESSURE MACHINE TO CORRELATE WITH THE OFFICE MACHINE AT THIS NEXT SCHEDULED VISIT   Please go for fasting lab work 1-2 weeks prior to your appointment      5   General instructions:   AVOID SALT BUT NOT ADDING AN READING LABELS TO MAKE SURE THERE IS LOW-SALT IN THE FOOD THAT YOU ARE EATING  o Goal is less than 2 g of sodium intake or less than 5 g of sodium chloride intake per day     Avoid nonsteroidal anti-inflammatory drugs such as Naprosyn, ibuprofen, Aleve, Advil, Celebrex, Meloxicam (Mobic) etc   You can use Tylenol as needed if you do not have any liver condition to be concerned about     Avoid medications such as Sudafed or decongestants and antihistamines that contained the D component which is the decongestant  You can take antihistamines without the decongestant or D component   Try to keep as active as possible     Try to lose 10 lb by your next visit     Please do not drink more than 2 glasses of alcohol/wine on a daily basis as this may contribute to your high blood pressure

## 2022-02-08 NOTE — LETTER
February 8, 2022     [address]  [address] [address]    Patient: Faustino Duke   YOB: 1960   Date of Visit: 2/8/2022       Dear Dr Lucinda Prajapati:    Thank you for referring Abeba Mari to me for evaluation  Below are my notes for this consultation  If you have questions, please do not hesitate to call me  I look forward to following your patient along with you  Sincerely,        Ovidio Lloyd MD        CC: No Recipients  Ovidio Lloyd MD  2/8/2022 11:58 AM  Sign when Signing Visit  RENAL FOLLOW UP NOTE: td     ASSESSMENT AND PLAN:  None at this time  1  Milderd Medin :  · Etiology:  Right radical nephrectomy July 15, 2016 for renal cell CA/hypertensive nephrosclerosis/arteriolar nephrosclerosis/diabetic nephropathy/?  Morbid obesity with?  FSGS  · Baseline creatinine:  new baseline appears to be from 1 8-as high as 2 4  · Current creatinine:  1 71 at baseline from 05/13/2020  · Urine protein creatinine ratio:  0 36 g at goal  · UA microscopic no proteinuria, trace intact heme from 05/13/2020  Recommendations:  · Treat hypertension-please see below  · Treat dyslipidemia-please see below  · Maintain proteinuria less than 1 g or as low as possible  · Avoid nephrotoxic agents such as NSAIDs, patient counseled as such  2   Volume:  · Current volume:  Euvolemic  · Treatment:  · Only use as needed furosemide if swelling recurs  3   Hypertension:   Home blood pressures:    A m :  130/64  P m :  148/75  Heart rate:     · Goal blood pressure:  Less than 130/80  Recommendations:  ·   push nonmedical regimen including weight loss, and any form of exercise patient can not tolerate; avoidance of salt; patient counseled as such  · Medication changes today:  · Amlodipine 5 mg in a m  · Recheck blood pressures in about 4-6 weeks  4   Electrolytes:  Chronic metabolic alkalosis most likely from primary CO2 retention,, doing well with a bicarbonate of 31  5   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:  · Calcium/magnesium/phosphorus:  All acceptable  · PTH intact:  73 at goal  · Vitamin-D:  47 at goal  6   Dyslipidemia:  · Goal LDL:  Less than 100  · Current lipid profile:  None at this time     Recommendations:  TO OBTAIN LIPID PROFILE  7   Anemia:  Current improved and doing well at 12 9 05/13/2021  -iron studies:  Saturation 25% ferritin 68 but cannot tolerate oral iron because of GI side effects  -B12 and folate deficiency being treated  -multiple myeloma was ruled out in January  -GI evaluation in January of 2018:   colonoscopy demonstrated internal/external hemorrhoids; also colonic polyps which were removed  Reta Lean was a suboptimal colonic prep   Felt she may require repeat colonoscopy in 3-6 months  EGD demonstrated small hiatal hernia  Recommendations:  -GI follow-up with Dr Garza     8   Other problems:  · Right radical nephrectomy for renal cell CA July 15, 2016  · 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 2, 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  · Morbid obesity  · Atrial fibrillation  · Cellulitis of the left leg back in January treated with intravenous antibiotics  · COPD on iron  · EGD involving multiple joints  · Hypothyroidism on supplement  · History of gait dysfunction using motorized scooter to ambulate  · Diabetes mellitus/diabetic neuropathy  · Admission 8/2021 for abdominal pain-workup is CT/EGD/colonoscopy/push enteroscopy all negative except for gastritis-esophagitis; the patient's symptoms resolved spontaneously  Patient with a ventral hernia which she deferred in terms of surgical revision, will follow-up with surgery       GI health maintenance:  Please see above under other problems  PATIENT INSTRUCTIONS:    Patient Instructions   1  Medication changes today:   Please begin amlodipine 5 mg daily in the morning  Please watch for leg swelling and call if you developed any    2  Please go for  fasting  lab work AT [facility]    3  Please take 1 week a blood pressure readings  4-6 weeks after making the above medication change     AS FOLLOWS  MORNING AND EVENING, SITTING  as follows:  · TAKE THE MORNING READINGS BEFORE ANY MEDICATIONS AND WHEN YOU ARE RELAXED FOR SEVERAL MINUTES  · TAKE THE EVENING READINGS:  BETWEEN 7-10 P M ; PRIOR TO ANY MEDICATIONS; AT LEAST IN OUR  FROM DINNER; AND CERTAINLY AFTER RELAXING FOR A FEW MINUTES  · PLEASE INCLUDE HEART RATE WITH YOUR BLOOD PRESSURE READINGS  · When taking standing readings, keep your arm supported at heart level and not dangling  · Make sure you are sitting with your back supported and feet on the ground and do not cross your legs or feet  · Make sure you have not taken any coffee or caffeine products or exercised or smoke cigarettes at least 30 minutes before taking your blood pressure  Then please mail these readings into the office    4  Follow-up in 4  months   Please bring in 1 week a blood pressure readings morning evening, sitting is outlined above   PLEASE BRING AN YOUR BLOOD PRESSURE MACHINE TO CORRELATE WITH THE OFFICE MACHINE AT THIS NEXT SCHEDULED VISIT   Please go for fasting lab work 1-2 weeks prior to your appointment      5  General instructions:   AVOID SALT BUT NOT ADDING AN READING LABELS TO MAKE SURE THERE IS LOW-SALT IN THE FOOD THAT YOU ARE EATING  o Goal is less than 2 g of sodium intake or less than 5 g of sodium chloride intake per day     Avoid nonsteroidal anti-inflammatory drugs such as Naprosyn, ibuprofen, Aleve, Advil, Celebrex, Meloxicam (Mobic) etc   You can use Tylenol as needed if you do not have any liver condition to be concerned about     Avoid medications such as Sudafed or decongestants and antihistamines that contained the D component which is the decongestant  You can take antihistamines without the decongestant or D component       Try to keep as active as possible     Try to lose 10 lb by your next visit     Please do not drink more than 2 glasses of alcohol/wine on a daily basis as this may contribute to your high blood pressure  Subjective: There has been no hospitalizations or acute illnesses since last visit  The patient overall is feeling well  No fevers, chills, or colds  Chronic mild cough from her nebulizer which is non productive  Good appetite and good energy  No hematuria, dysuria, voiding symptoms or foamy urine  No gastrointestinal symptoms except for constipation  No chest pain, chronic dyspnea on exertion on oxygen, no edema  No headaches, dizziness or lightheadedness  Blood pressure medications:   Metoprolol tartrate 50 mg twice a day      ROS:  See HPI, otherwise review of systems as completely reviewed with the patient are negative    Past Medical History:   Diagnosis Date    Anemia     Arthritis     [facility]     Chronic kidney disease     Diabetes mellitus ([facility]     Disease of thyroid gland     HLD (hyperlipidemia)     Hypertension     [facility]     Pneumonia      Past Surgical History:   Procedure Laterality Date    CHOLECYSTECTOMY      CT GUIDED PERC DRAINAGE CATHETER PLACEMENT  5/16/2016    GALLBLADDER SURGERY  05/01/2004    HYSTERECTOMY  06/01/2018    NEPHRECTOMY Right 08/02/2018     Family History   Problem Relation Age of Onset    No Known Problems Mother     No Known Problems Father       reports that she quit smoking about 11 years ago  She has a 90 00 pack-year smoking history  She quit smokeless tobacco use about 10 years ago  She reports current drug use  Drug: Marijuana  She reports that she does not drink alcohol      I COMPLETELY REVIEWED THE PAST MEDICAL HISTORY/PAST SURGICAL HISTORY/SOCIAL HISTORY/FAMILY HISTORY/AND MEDICATIONS  AND UPDATED ALL    Objective:     Vitals:   BP sitting on left:  120/68 with a heart rate 80 and slightly irregular      Weight (last 2 days)     Date/Time Weight    02/08/22 1133 --     Comments:   Weight: patient unable to get on scale at 02/08/22 1133       Wt Readings from Last 3 Encounters:   11/18/21 (!) 140 kg (308 lb)   08/19/21 136 kg (300 lb)   08/09/21 136 kg (300 lb)       Body mass index is 52 87 kg/m²      Physical Exam: General:  No acute distress/on oxygen/morbidly obese  Skin:  No acute rash  Eyes:  No scleral icterus, noninjected, no discharge from eyes  ENT:  Moist mucous membranes  Neck:  Supple, no jugular venous distention, trachea is midline, no lymphadenopathy and no thyromegaly  Back   No CVAT  Chest:  Clear to auscultation and percussion, good respiratory effort  CVS:  Regular rate and rhythm without a rub, or gallops or murmurs  Abdomen:  Obese,Soft and nontender with normal bowel sounds  Extremities:  No cyanosis and no edema/venous stasis changes, no arthritic changes, normal range of motion  Neuro:  Grossly intact  Psych:  Alert, oriented x3 and appropriate      Medications:    Current Outpatient Medications:     albuterol (2 5 mg/3 mL) 0 083 % nebulizer solution, INHALE CONTENTS OF 1 VIAL ( 3 MILLILITERS ) IN NEBULIZER BY MOUTH AND INTO THE LUNGS EVERY 6 HOURS IF NEEDED FOR WHEEZING OR SHORTNESS OF BREATH, Disp: 75 mL, Rfl: 5    albuterol (PROVENTIL HFA,VENTOLIN HFA) 90 mcg/act inhaler, Inhale 2 puffs every 4 (four) hours as needed for wheezing, Disp: 8 5 g, Rfl: 2    atorvastatin (LIPITOR) 10 mg tablet, Take 1 tablet (10 mg total) by mouth daily, Disp: 90 tablet, Rfl: 1    Blood Glucose Monitoring Suppl (WoofRadarace Pro Glucose Meter) SAVANNAH, Use 2 (two) times a day WoofRadarace glucometer, test twice daily, Disp: 1 each, Rfl: 0    cholecalciferol (VITAMIN D3) 400 units tablet, Take 1 tablet (400 Units total) by mouth daily, Disp: 90 tablet, Rfl: 1    DULoxetine (CYMBALTA) 30 mg delayed release capsule, Take 1 capsule (30 mg total) by mouth 2 (two) times a day, Disp: 180 capsule, Rfl: 1    Embrace Lancets Ultra Thin 30G MISC, Use 2 (two) times a day, Disp: 100 each, Rfl: 5    ferrous sulfate 325 (65 Fe) mg tablet, Take 325 mg by mouth daily with breakfast , Disp: , Rfl:     glucose blood (Embrace Pro Glucose Test) test strip, PLEASE RE-WRITE RX FOR EMBRACE PRO **EMBRACE REGUALR IS NOT AVAILABLE**  ---TEST TWICE DAILY--, Disp: 100 strip, Rfl: 5    glucose blood test strip, Use 1 each daily Embrace test strips, test daily, Disp: 50 strip, Rfl: 5    Insulin Pen Needle (Pen Needles) 32G X 4 MM MISC, Use daily Embrace pen needles, test daily, Disp: 50 each, Rfl: 5    levothyroxine 100 mcg tablet, Take 1 tablet (100 mcg total) by mouth daily in the early morning, Disp: 90 tablet, Rfl: 3    LORazepam (ATIVAN) 0 5 mg tablet, Take 1 tablet (0 5 mg total) by mouth every 8 (eight) hours as needed for anxiety, Disp: 30 tablet, Rfl: 2    metoprolol tartrate (LOPRESSOR) 50 mg tablet, Take 1 tablet (50 mg total) by mouth 2 (two) times a day, Disp: 180 tablet, Rfl: 1    pantoprazole (PROTONIX) 40 mg tablet, Take 1 tablet (40 mg total) by mouth daily, Disp: 90 tablet, Rfl: 1    vitamin B-12 (VITAMIN B-12) 500 mcg tablet, Take 1 tablet (500 mcg total) by mouth daily, Disp: 90 tablet, Rfl: 3    warfarin (Coumadin) 3 mg tablet, Take 1 tablet (3 mg total) by mouth daily, Disp: 90 tablet, Rfl: 1    amLODIPine (NORVASC) 5 mg tablet, Take 1 tablet (5 mg total) by mouth daily, Disp: 30 tablet, Rfl: 5    famotidine (PEPCID) 20 mg tablet, take 1 tablet by mouth once daily (Patient not taking: Reported on 2/8/2022), Disp: 90 tablet, Rfl: 1    Lab, Imaging and other studies: I have personally reviewed pertinent labs    Laboratory Results:  Results for orders placed or performed in visit on 10/18/21   Magnesium   Result Value Ref Range    Magnesium, Serum 2 0 1 5 - 2 5 mg/dL   Basic metabolic panel   Result Value Ref Range    Glucose, Random 112 65 - 139 mg/dL    BUN 25 7 - 25 mg/dL    Creatinine 1 53 (H) 0 50 - 0 99 mg/dL    eGFR Non  36 (L) > OR = 60 mL/min/1 73m2    eGFR  42 (L) > OR = 60 mL/min/1 73m2 SL AMB BUN/CREATININE RATIO 16 6 - 22 (calc)    Sodium 140 135 - 146 mmol/L    Potassium 5 0 3 5 - 5 3 mmol/L    Chloride 102 98 - 110 mmol/L    CO2 34 (H) 20 - 32 mmol/L    Calcium 9 3 8 6 - 10 4 mg/dL             Invalid input(s): ALBUMIN      Radiology review:   chest X-ray    Ultrasound      Portions of the record may have been created with voice recognition software  Occasional wrong word or "sound a like" substitutions may have occurred due to the inherent limitations of voice recognition software  Read the chart carefully and recognize, using context, where substitutions have occurred

## 2022-02-10 LAB
ALBUMIN SERPL-MCNC: 3.5 G/DL (ref 3.6–5.1)
ALBUMIN/GLOB SERPL: 1 (CALC) (ref 1–2.5)
ALP SERPL-CCNC: 119 U/L (ref 37–153)
ALT SERPL-CCNC: 8 U/L (ref 6–29)
AST SERPL-CCNC: 12 U/L (ref 10–35)
BILIRUB SERPL-MCNC: 0.4 MG/DL (ref 0.2–1.2)
BUN SERPL-MCNC: 31 MG/DL (ref 7–25)
BUN/CREAT SERPL: 18 (CALC) (ref 6–22)
CALCIUM SERPL-MCNC: 9.3 MG/DL (ref 8.6–10.4)
CALCIUM SERPL-MCNC: 9.5 MG/DL (ref 8.6–10.4)
CHLORIDE SERPL-SCNC: 100 MMOL/L (ref 98–110)
CHOLEST SERPL-MCNC: 161 MG/DL
CHOLEST/HDLC SERPL: 5 (CALC)
CK SERPL-CCNC: 34 U/L (ref 29–143)
CO2 SERPL-SCNC: 35 MMOL/L (ref 20–32)
CREAT SERPL-MCNC: 1.71 MG/DL (ref 0.5–0.99)
ERYTHROCYTE [DISTWIDTH] IN BLOOD BY AUTOMATED COUNT: 13.8 % (ref 11–15)
FERRITIN SERPL-MCNC: 52 NG/ML (ref 16–288)
GLOBULIN SER CALC-MCNC: 3.5 G/DL (CALC) (ref 1.9–3.7)
GLUCOSE SERPL-MCNC: 173 MG/DL (ref 65–99)
HCT VFR BLD AUTO: 34.9 % (ref 35–45)
HDLC SERPL-MCNC: 32 MG/DL
HGB BLD-MCNC: 11.2 G/DL (ref 11.7–15.5)
IRON SATN MFR SERPL: 25 % (CALC) (ref 16–45)
IRON SERPL-MCNC: 77 MCG/DL (ref 45–160)
LDLC SERPL CALC-MCNC: 98 MG/DL (CALC)
MAGNESIUM SERPL-MCNC: 1.9 MG/DL (ref 1.5–2.5)
MCH RBC QN AUTO: 31.4 PG (ref 27–33)
MCHC RBC AUTO-ENTMCNC: 32.1 G/DL (ref 32–36)
MCV RBC AUTO: 97.8 FL (ref 80–100)
NONHDLC SERPL-MCNC: 129 MG/DL (CALC)
PHOSPHATE SERPL-MCNC: 3.3 MG/DL (ref 2.5–4.5)
PLATELET # BLD AUTO: 267 THOUSAND/UL (ref 140–400)
PMV BLD REES-ECKER: 10.3 FL (ref 7.5–12.5)
POTASSIUM SERPL-SCNC: 5.4 MMOL/L (ref 3.5–5.3)
PROT SERPL-MCNC: 7 G/DL (ref 6.1–8.1)
PTH-INTACT SERPL-MCNC: 41 PG/ML (ref 14–64)
RBC # BLD AUTO: 3.57 MILLION/UL (ref 3.8–5.1)
SL AMB EGFR AFRICAN AMERICAN: 37 ML/MIN/1.73M2
SL AMB EGFR NON AFRICAN AMERICAN: 32 ML/MIN/1.73M2
SODIUM SERPL-SCNC: 140 MMOL/L (ref 135–146)
TIBC SERPL-MCNC: 313 MCG/DL (CALC) (ref 250–450)
TRIGL SERPL-MCNC: 215 MG/DL
WBC # BLD AUTO: 7.1 THOUSAND/UL (ref 3.8–10.8)

## 2022-02-15 ENCOUNTER — TELEPHONE (OUTPATIENT)
Dept: NEPHROLOGY | Facility: CLINIC | Age: 62
End: 2022-02-15

## 2022-02-15 NOTE — TELEPHONE ENCOUNTER
LM FOR THE PATIENT ADVISED TO BEGIN A LOS k DIET AND GO FOR LABS IN 2-3 WEEKS  - BEGIN IRON SUPPLEMENT TWICE A WEEK

## 2022-02-15 NOTE — TELEPHONE ENCOUNTER
----- Message from Larisa Scott MD sent at 2/15/2022  8:58 AM EST -----  Labs in general look good  2 things:  1  Low-potassium diet  2   If she can tolerate oral iron over-the-counter twice a week watch for constipation that would be great      3 Repeat a potassium level on a low-potassium diet 2-3 weeks nonfasting after she makes the adjustment

## 2022-02-16 NOTE — TELEPHONE ENCOUNTER
I spoke to the patient she is aware of message, no questions or concerns at this time  [facility]

## 2022-02-19 LAB
BUN SERPL-MCNC: 25 MG/DL (ref 7–25)
BUN/CREAT SERPL: 15 (CALC) (ref 6–22)
CALCIUM SERPL-MCNC: 8.9 MG/DL (ref 8.6–10.4)
CHLORIDE SERPL-SCNC: 101 MMOL/L (ref 98–110)
CO2 SERPL-SCNC: 35 MMOL/L (ref 20–32)
CREAT SERPL-MCNC: 1.62 MG/DL (ref 0.5–0.99)
GLUCOSE SERPL-MCNC: 182 MG/DL (ref 65–139)
POTASSIUM SERPL-SCNC: 4.7 MMOL/L (ref 3.5–5.3)
SL AMB EGFR AFRICAN AMERICAN: 39 ML/MIN/1.73M2
SL AMB EGFR NON AFRICAN AMERICAN: 33 ML/MIN/1.73M2
SODIUM SERPL-SCNC: 141 MMOL/L (ref 135–146)

## 2022-02-21 ENCOUNTER — TELEPHONE (OUTPATIENT)
Dept: OTHER | Facility: HOSPITAL | Age: 62
End: 2022-02-21

## 2022-02-21 NOTE — TELEPHONE ENCOUNTER
Repeat blood work reviewed  Potassium down to 4 7  As noted previously she was not eating high potassium foods or pumping her fist when blood was drawn  Rather mild, unexplained elevation in potassium  Renal function stable  Creatinine 1 6  No changes at this time

## 2022-03-02 ENCOUNTER — TELEPHONE (OUTPATIENT)
Dept: FAMILY MEDICINE CLINIC | Facility: CLINIC | Age: 62
End: 2022-03-02

## 2022-03-02 DIAGNOSIS — F41.0 PANIC ATTACK: ICD-10-CM

## 2022-03-02 RX ORDER — LORAZEPAM 0.5 MG/1
0.5 TABLET ORAL EVERY 8 HOURS PRN
Qty: 30 TABLET | Refills: 2 | Status: SHIPPED | OUTPATIENT
Start: 2022-03-02 | End: 2022-04-25 | Stop reason: SDUPTHER

## 2022-03-02 NOTE — TELEPHONE ENCOUNTER
Stephen Finch called and stated that sharee is in need of the following medication     LORazepam (ATIVAN) 0 5 mg tablet

## 2022-03-03 ENCOUNTER — OFFICE VISIT (OUTPATIENT)
Dept: FAMILY MEDICINE CLINIC | Facility: CLINIC | Age: 62
End: 2022-03-03
Payer: MEDICARE

## 2022-03-03 VITALS
OXYGEN SATURATION: 98 % | TEMPERATURE: 97.6 F | HEART RATE: 102 BPM | DIASTOLIC BLOOD PRESSURE: 90 MMHG | RESPIRATION RATE: 16 BRPM | SYSTOLIC BLOOD PRESSURE: 150 MMHG

## 2022-03-03 DIAGNOSIS — K29.70 GASTRITIS: ICD-10-CM

## 2022-03-03 DIAGNOSIS — I48.11 LONGSTANDING PERSISTENT ATRIAL FIBRILLATION (HCC): ICD-10-CM

## 2022-03-03 DIAGNOSIS — E11.49 OTHER DIABETIC NEUROLOGICAL COMPLICATION ASSOCIATED WITH TYPE 2 DIABETES MELLITUS (HCC): ICD-10-CM

## 2022-03-03 DIAGNOSIS — E78.5 DYSLIPIDEMIA: ICD-10-CM

## 2022-03-03 DIAGNOSIS — K21.9 GASTROESOPHAGEAL REFLUX DISEASE WITHOUT ESOPHAGITIS: ICD-10-CM

## 2022-03-03 DIAGNOSIS — I48.0 PAROXYSMAL ATRIAL FIBRILLATION (HCC): ICD-10-CM

## 2022-03-03 PROCEDURE — 99214 OFFICE O/P EST MOD 30 MIN: CPT | Performed by: INTERNAL MEDICINE

## 2022-03-03 RX ORDER — METOPROLOL TARTRATE 50 MG/1
50 TABLET, FILM COATED ORAL 2 TIMES DAILY
Qty: 180 TABLET | Refills: 1 | Status: SHIPPED | OUTPATIENT
Start: 2022-03-03 | End: 2022-04-25 | Stop reason: SDUPTHER

## 2022-03-03 RX ORDER — PANTOPRAZOLE SODIUM 40 MG/1
40 TABLET, DELAYED RELEASE ORAL DAILY
Qty: 90 TABLET | Refills: 1 | Status: SHIPPED | OUTPATIENT
Start: 2022-03-03

## 2022-03-03 RX ORDER — ATORVASTATIN CALCIUM 10 MG/1
10 TABLET, FILM COATED ORAL DAILY
Qty: 90 TABLET | Refills: 1 | Status: SHIPPED | OUTPATIENT
Start: 2022-03-03 | End: 2022-04-25 | Stop reason: SDUPTHER

## 2022-03-03 RX ORDER — WARFARIN SODIUM 3 MG/1
3 TABLET ORAL
Qty: 90 TABLET | Refills: 1 | Status: SHIPPED | OUTPATIENT
Start: 2022-03-03 | End: 2022-04-25 | Stop reason: SDUPTHER

## 2022-03-03 RX ORDER — DULOXETIN HYDROCHLORIDE 30 MG/1
30 CAPSULE, DELAYED RELEASE ORAL 2 TIMES DAILY
Qty: 180 CAPSULE | Refills: 1 | Status: SHIPPED | OUTPATIENT
Start: 2022-03-03 | End: 2022-04-25 | Stop reason: SDUPTHER

## 2022-03-03 RX ORDER — FAMOTIDINE 20 MG/1
20 TABLET, FILM COATED ORAL DAILY
Qty: 90 TABLET | Refills: 1 | Status: SHIPPED | OUTPATIENT
Start: 2022-03-03 | End: 2022-04-25 | Stop reason: SDUPTHER

## 2022-03-03 NOTE — PROGRESS NOTES
Assessment/Plan:    Hypertension;  Continue amlodipine 5 mg daily  Continue Lopressor 50 mg b i d  Continue low-salt diet/dash diet  Check blood pressure at home and keep a log  Hyperlipidemia  Recent lipid panel reviewed  Continue statins  Chronic atrial fibrillation on Coumadin  Continue Lopressor 50 mg b i d   Continue Coumadin 3 mg daily  Check INR  Chronic GERD  Discontinue Protonix  Continue Pepcid 20 mg daily  type 2 iabetes  Diet controlled  Check hemoglobin A1c  Diabetic diet  Review with hemoglobin A1c result  Chronic respiratory failure  On 3 liters/minute of oxygen at home  Asthma/COPD  Stable no recent exacerbation  Continue bronchodilator nebs and inhaler  Due for COVID booster  Advised to get COVID booster  Up-to-date with pneumococcal and flu vaccines  No problem-specific Assessment & Plan notes found for this encounter  Diagnoses and all orders for this visit:    Dyslipidemia    Gastritis    Longstanding persistent atrial fibrillation (HCC)    Paroxysmal atrial fibrillation (HCC)    Gastroesophageal reflux disease without esophagitis          Subjective:      Patient ID: Flakito Boyd is a 60 y o  female with DM2, chronic Afib, HTN, GERD, Anemia , Obesity , endometrial ca here for follow up    HPI  C/o - no complaints today  F/u and eval for DM: Blood sugar at home 120-150  , keeps a diabetic diet  Not on meds  No recent eye appointments due to transport issues  No hypoglycemic symptoms  F/u and eval for HTN - compliant with medications  Compliant with low-salt diet on amlodipine 5 mg daily  Lopressor 50 mg b i d  Occasional palpitation and skipped beats  Denied headaches or dizziness  No side effects from medication  F/u and  eval chronic atrial fibrillation-Occasional palpitation and skipped beats  , denied dizziness  Compliant with Lopressor and warfarin  Has not checked an INR recently in the past few months      F/u and eval for chronic respiratory failure and asthma/COPD- on 3l/imin at home   Quit smoking > 10 yrs ago  No wheezing or recent admission for COPD exacerbation  Up-to-date with covid x 2, flu vaccine and prevnar  Due for COVID booster  Labs reviewed  Colonoscopy last year  The following portions of the patient's history were reviewed and updated as appropriate: allergies, current medications, past family history, past medical history, past social history, past surgical history and problem list     Review of Systems      Objective:      /90 (BP Location: Left arm, Patient Position: Sitting, Cuff Size: Large)   Pulse 102   Temp 97 6 °F (36 4 °C) (Temporal)   Resp 16   SpO2 98%          Physical Exam  Constitutional:       General: She is not in acute distress  Appearance: She is obese  She is not toxic-appearing  HENT:      Head: Normocephalic and atraumatic  Eyes:      Extraocular Movements: Extraocular movements intact  Pupils: Pupils are equal, round, and reactive to light  Cardiovascular:      Rate and Rhythm: Normal rate  Rhythm irregular  Pulses: Normal pulses  Heart sounds: No murmur heard  No gallop  Pulmonary:      Effort: Pulmonary effort is normal       Breath sounds: Normal breath sounds  No wheezing, rhonchi or rales  Chest:      Chest wall: No tenderness  Abdominal:      General: Bowel sounds are normal  There is distension  Palpations: There is no mass  Tenderness: There is no abdominal tenderness  There is no guarding or rebound  Hernia: No hernia is present  Musculoskeletal:         General: Normal range of motion  Cervical back: Normal range of motion and neck supple  Right lower leg: Edema present  Left lower leg: Edema present  Skin:     Capillary Refill: Capillary refill takes less than 2 seconds  Findings: Rash present  Neurological:      General: No focal deficit present        Mental Status: She is alert and oriented to person, place, and time  Sensory: Sensory deficit present  Motor: Weakness present        Gait: Gait abnormal    Psychiatric:         Mood and Affect: Mood normal          Behavior: Behavior normal

## 2022-03-09 LAB — INR PPP: 4.4 (ref 0.84–1.19)

## 2022-03-11 ENCOUNTER — ANTICOAG VISIT (OUTPATIENT)
Dept: FAMILY MEDICINE CLINIC | Facility: CLINIC | Age: 62
End: 2022-03-11

## 2022-03-11 ENCOUNTER — TELEPHONE (OUTPATIENT)
Dept: FAMILY MEDICINE CLINIC | Facility: CLINIC | Age: 62
End: 2022-03-11

## 2022-03-11 NOTE — TELEPHONE ENCOUNTER
CALLED AND SPOKE TO CAREGIVER BONNIE  INR HAS NOT BEEN MONITORED SINCE 10/18/2021 AND IS CURRENTLY TAKING 3 MG DAILY  RECEIVED NEW INR 03/09/2022 4 4, AS PER DR CHAVEZ HOLD COUMADIN FOR 2 DAYS THEN ALTERNATE 3 MG WITH 1 5 MG  AND RECHECK INR IN 1 WEEK  AS PER BONNIE NEEDS A STANDING ORDER FOR INR DRAWS, HOME DRAWS FROM [facility]  RESULTS SCANNED TO CHART     Nathaneil Best

## 2022-03-11 NOTE — PROGRESS NOTES
CALLED AND SPOKE TO CAREGIVER, BONNIE  HOLD COUMADIN FOR 2 DAYS AND THEN ALTERNATE 3 MG WITH 1 5 MG AND RECHECK INR IN 1 WEEK AS PER DR SCOTT Grover

## 2022-03-14 DIAGNOSIS — I48.0 PAROXYSMAL ATRIAL FIBRILLATION (HCC): Primary | ICD-10-CM

## 2022-03-17 LAB — INR PPP: 1.4 (ref 0.84–1.19)

## 2022-03-18 ENCOUNTER — TELEPHONE (OUTPATIENT)
Dept: FAMILY MEDICINE CLINIC | Facility: CLINIC | Age: 62
End: 2022-03-18

## 2022-03-18 ENCOUNTER — ANTICOAG VISIT (OUTPATIENT)
Dept: FAMILY MEDICINE CLINIC | Facility: CLINIC | Age: 62
End: 2022-03-18

## 2022-03-18 LAB
INR PPP: 1.4
PROTHROMBIN TIME: 13.5 SEC (ref 9–11.5)

## 2022-03-18 NOTE — TELEPHONE ENCOUNTER
----- Message from James Edmondson MD sent at 3/18/2022 12:27 PM EDT -----  Please call the patient regarding her abnormal result  Her INR is 1 4 from 4 4  Call patient and ask her how much she is taking and text me

## 2022-03-18 NOTE — TELEPHONE ENCOUNTER
CALLED AND SPOKE TO BONNIE, GAVE COUMADIN INSTRUCTION AS PER DR CHAVEZ TAKE 3 MG DAILY AND RECHECK INR IN [address] [facility]

## 2022-03-30 LAB — INR PPP: 2.9 (ref 0.84–1.19)

## 2022-03-31 ENCOUNTER — TELEPHONE (OUTPATIENT)
Dept: FAMILY MEDICINE CLINIC | Facility: CLINIC | Age: 62
End: 2022-03-31

## 2022-03-31 ENCOUNTER — ANTICOAG VISIT (OUTPATIENT)
Dept: FAMILY MEDICINE CLINIC | Facility: CLINIC | Age: 62
End: 2022-03-31

## 2022-03-31 LAB
INR PPP: 2.9
PROTHROMBIN TIME: 26.9 SEC (ref 9–11.5)

## 2022-03-31 NOTE — PROGRESS NOTES
CALLED AND SPOKE TO PATIENT INSTRUCTED TO REMAIN ON SAME DOSE OF COUMADIN(3 MG DAILY) RECHECK INR IN 1 WEEK    Heide Bazan

## 2022-03-31 NOTE — TELEPHONE ENCOUNTER
CALLED AND SPOKE TO BONNIE GAVE COUMADIN INSTRUCTION REMAIN ON SAME DOSE 3 MG DAILY AND RECHECK IN 1 WEEK    Darlean Osgood

## 2022-03-31 NOTE — TELEPHONE ENCOUNTER
----- Message from Addi Robert MD sent at 3/31/2022  2:39 PM EDT -----  Please call the patient regarding her abnormal result  INR 2 9  Continue same dose    Recheck in 1 week

## 2022-04-06 LAB — INR PPP: 2.3 (ref 0.84–1.19)

## 2022-04-07 ENCOUNTER — ANTICOAG VISIT (OUTPATIENT)
Dept: FAMILY MEDICINE CLINIC | Facility: CLINIC | Age: 62
End: 2022-04-07

## 2022-04-07 ENCOUNTER — TELEPHONE (OUTPATIENT)
Dept: FAMILY MEDICINE CLINIC | Facility: CLINIC | Age: 62
End: 2022-04-07

## 2022-04-07 LAB
INR PPP: 2.3
PROTHROMBIN TIME: 22.4 SEC (ref 9–11.5)

## 2022-04-07 NOTE — TELEPHONE ENCOUNTER
CALLED AND SPOKE TO PATIENT ADVISED TO REMAIN ON SAME DOSE OF COUMADIN AND RECHECK INR IN 2 WEEKS  Teressa Mora

## 2022-04-07 NOTE — PROGRESS NOTES
CALLED AND SPOKE TO PATIENT ADVISED TO REMAIN ON SAME DOSE OF COUMADIN AND RECHECK INR IN 2 WEEKS     Keiko Irwin

## 2022-04-07 NOTE — TELEPHONE ENCOUNTER
----- Message from Kelsey Chahal MD sent at 4/7/2022  9:39 AM EDT -----  Please call the patient regarding her abnormal result  INR is 2 3  Continue same Coumadin dose    Recheck in 2 weeks

## 2022-04-20 ENCOUNTER — RA CDI HCC (OUTPATIENT)
Dept: OTHER | Facility: HOSPITAL | Age: 62
End: 2022-04-20

## 2022-04-20 LAB — INR PPP: 3.4 (ref 0.84–1.19)

## 2022-04-20 NOTE — PROGRESS NOTES
E11 22  [facility]  coding opportunities          Chart Reviewed number of suggestions sent to Provider: 1     Patients Insurance     Medicare Insurance: [employer]

## 2022-04-21 LAB
INR PPP: 3.4
PROTHROMBIN TIME: 31.4 SEC (ref 9–11.5)

## 2022-04-25 ENCOUNTER — OFFICE VISIT (OUTPATIENT)
Dept: FAMILY MEDICINE CLINIC | Facility: CLINIC | Age: 62
End: 2022-04-25
Payer: MEDICARE

## 2022-04-25 VITALS
HEART RATE: 86 BPM | BODY MASS INDEX: 50.02 KG/M2 | DIASTOLIC BLOOD PRESSURE: 70 MMHG | SYSTOLIC BLOOD PRESSURE: 130 MMHG | TEMPERATURE: 97.1 F | OXYGEN SATURATION: 97 % | WEIGHT: 293 LBS | RESPIRATION RATE: 16 BRPM | HEIGHT: 64 IN

## 2022-04-25 DIAGNOSIS — I48.0 PAROXYSMAL ATRIAL FIBRILLATION (HCC): Chronic | ICD-10-CM

## 2022-04-25 DIAGNOSIS — Z00.00 MEDICARE ANNUAL WELLNESS VISIT, SUBSEQUENT: Primary | ICD-10-CM

## 2022-04-25 DIAGNOSIS — E11.69 DIABETES MELLITUS TYPE 2 IN OBESE (HCC): ICD-10-CM

## 2022-04-25 DIAGNOSIS — J44.9 COPD WITH ASTHMA (HCC): ICD-10-CM

## 2022-04-25 DIAGNOSIS — I48.11 LONGSTANDING PERSISTENT ATRIAL FIBRILLATION (HCC): ICD-10-CM

## 2022-04-25 DIAGNOSIS — Z12.31 ENCOUNTER FOR SCREENING MAMMOGRAM FOR MALIGNANT NEOPLASM OF BREAST: ICD-10-CM

## 2022-04-25 DIAGNOSIS — N18.32 STAGE 3B CHRONIC KIDNEY DISEASE (HCC): ICD-10-CM

## 2022-04-25 DIAGNOSIS — F33.9 DEPRESSION, RECURRENT (HCC): ICD-10-CM

## 2022-04-25 DIAGNOSIS — K21.9 GASTROESOPHAGEAL REFLUX DISEASE WITHOUT ESOPHAGITIS: ICD-10-CM

## 2022-04-25 DIAGNOSIS — Z12.4 SCREENING FOR CERVICAL CANCER: ICD-10-CM

## 2022-04-25 DIAGNOSIS — E66.9 DIABETES MELLITUS TYPE 2 IN OBESE (HCC): ICD-10-CM

## 2022-04-25 DIAGNOSIS — E11.49 OTHER DIABETIC NEUROLOGICAL COMPLICATION ASSOCIATED WITH TYPE 2 DIABETES MELLITUS (HCC): ICD-10-CM

## 2022-04-25 DIAGNOSIS — F41.0 PANIC ATTACK: ICD-10-CM

## 2022-04-25 DIAGNOSIS — L84 PRE-ULCERATIVE CORN OR CALLOUS: ICD-10-CM

## 2022-04-25 DIAGNOSIS — E78.5 DYSLIPIDEMIA: ICD-10-CM

## 2022-04-25 PROCEDURE — 99214 OFFICE O/P EST MOD 30 MIN: CPT | Performed by: INTERNAL MEDICINE

## 2022-04-25 PROCEDURE — G0439 PPPS, SUBSEQ VISIT: HCPCS | Performed by: INTERNAL MEDICINE

## 2022-04-25 RX ORDER — FAMOTIDINE 20 MG/1
20 TABLET, FILM COATED ORAL DAILY
Qty: 90 TABLET | Refills: 1 | Status: SHIPPED | OUTPATIENT
Start: 2022-04-25 | End: 2022-07-27 | Stop reason: SDUPTHER

## 2022-04-25 RX ORDER — WARFARIN SODIUM 3 MG/1
3 TABLET ORAL
Qty: 90 TABLET | Refills: 1 | Status: SHIPPED | OUTPATIENT
Start: 2022-04-25

## 2022-04-25 RX ORDER — DULOXETIN HYDROCHLORIDE 30 MG/1
30 CAPSULE, DELAYED RELEASE ORAL 2 TIMES DAILY
Qty: 180 CAPSULE | Refills: 1 | Status: SHIPPED | OUTPATIENT
Start: 2022-04-25

## 2022-04-25 RX ORDER — OMEGA-3S/DHA/EPA/FISH OIL/D3 300MG-1000
400 CAPSULE ORAL DAILY
Qty: 90 TABLET | Refills: 1 | Status: SHIPPED | OUTPATIENT
Start: 2022-04-25 | End: 2022-07-27 | Stop reason: SDUPTHER

## 2022-04-25 RX ORDER — METOPROLOL TARTRATE 50 MG/1
50 TABLET, FILM COATED ORAL 2 TIMES DAILY
Qty: 180 TABLET | Refills: 1 | Status: SHIPPED | OUTPATIENT
Start: 2022-04-25 | End: 2022-07-27 | Stop reason: SDUPTHER

## 2022-04-25 RX ORDER — LORAZEPAM 0.5 MG/1
0.5 TABLET ORAL EVERY 8 HOURS PRN
Qty: 30 TABLET | Refills: 2 | Status: SHIPPED | OUTPATIENT
Start: 2022-04-25 | End: 2022-07-27 | Stop reason: SDUPTHER

## 2022-04-25 RX ORDER — ATORVASTATIN CALCIUM 10 MG/1
10 TABLET, FILM COATED ORAL DAILY
Qty: 90 TABLET | Refills: 1 | Status: SHIPPED | OUTPATIENT
Start: 2022-04-25 | End: 2022-07-27 | Stop reason: SDUPTHER

## 2022-04-25 NOTE — PROGRESS NOTES
Kalpana Malcolm is here for her Subsequent Wellness visit  Last Medicare Wellness visit information reviewed, patient interviewed, no change since last AWV  Health Risk Assessment:   Patient rates overall health as good  Patient feels that their physical health rating is same  Patient is satisfied with their life  Eyesight was rated as same  Hearing was rated as same  Patient feels that their emotional and mental health rating is much better  Patients states they are sometimes angry  Patient states they are always unusually tired/fatigued  Pain experienced in the last 7 days has been a lot  Patient's pain rating has been 8/10  Patient states that she has experienced no weight loss or gain in last 6 months  Depression Screening:   PHQ-2 Score: 0      Fall Risk Screening: In the past year, patient has experienced: no history of falling in past year      Urinary Incontinence Screening:   Patient has not leaked urine accidently in the last six months  Home Safety:  Patient has trouble with stairs inside or outside of their home  Patient has working smoke alarms and has working carbon monoxide detector  Home safety hazards include: none  Nutrition:   Current diet is No Added Salt, Regular and Frequent junk food  Medications:   Patient is currently taking over-the-counter supplements  OTC medications include: see medication list  Patient is able to manage medications  Activities of Daily Living (ADLs)/Instrumental Activities of Daily Living (IADLs):   Walk and transfer into and out of bed and chair?: Yes  Dress and groom yourself?: Yes    Bathe or shower yourself?: Yes    Feed yourself? Yes  Do your laundry/housekeeping?: Yes  Manage your money, pay your bills and track your expenses?: Yes  Make your own meals?: Yes    Do your own shopping?: No    ADL comments: Has assistance   Significant other does shopping     Previous Hospitalizations:   Any hospitalizations or ED visits within the last 12 months?: No Advance Care Planning:   Living will: Yes    Durable POA for healthcare: Yes    Advanced directive: Yes      Cognitive Screening:   Provider or family/friend/caregiver concerned regarding cognition?: No    PREVENTIVE SCREENINGS      Cardiovascular Screening:    General: History Lipid Disorder and Screening Current      Diabetes Screening:     General: History Diabetes and Screening Current      Colorectal Cancer Screening:     General: Screening Current      Breast Cancer Screening:       Due for: Mammogram        Cervical Cancer Screening:      Due for: Cervical Pap Smear      Osteoporosis Screening:      Due for: DXA Axial      Abdominal Aortic Aneurysm (AAA) Screening:        General: Screening Not Indicated      Lung Cancer Screening:     General: Screening Current      Hepatitis C Screening:    General: Screening Current    Screening, Brief Intervention, and Referral to Treatment (SBIRT)    Screening  Typical number of drinks in a day: 0  Typical number of drinks in a week: 0  Interpretation: Low risk drinking behavior  AUDIT-C Screenin) How often did you have a drink containing alcohol in the past year? never  2) How many drinks did you have on a typical day when you were drinking in the past year? 0  3) How often did you have 6 or more drinks on one occasion in the past year? never    AUDIT-C Score: 0  Interpretation: Score 0-2 (female): Negative screen for alcohol misuse    Single Item Drug Screening:  How often have you used an illegal drug (including marijuana) or a prescription medication for non-medical reasons in the past year? never    Single Item Drug Screen Score: 0  Interpretation: Negative screen for possible drug use disorder    Brief Intervention  Alcohol & drug use screenings were reviewed  No concerns regarding substance use disorder identified       Other Counseling Topics:   Car/seat belt/driving safety, skin self-exam, sunscreen and regular weightbearing exercise and calcium and vitamin D intake

## 2022-04-25 NOTE — ASSESSMENT & PLAN NOTE
Lab Results   Component Value Date    HGBA1C 6 9 03/09/2022   home bbg around 120-130  Off of all meds  Recheck a1c next visit

## 2022-04-25 NOTE — ASSESSMENT & PLAN NOTE
Lab Results   Component Value Date    EGFR 35 08/12/2021    EGFR 33 08/11/2021    EGFR 30 08/10/2021    CREATININE 1 62 (H) 02/18/2022    CREATININE 1 71 (H) 02/09/2022    CREATININE 1 53 (H) 10/18/2021   She is seeing a nephrologist regularly

## 2022-04-25 NOTE — PROGRESS NOTES
BMI Counseling: Body mass index is 53 04 kg/m²  The BMI is above normal  Nutrition recommendations include decreasing portion sizes, decreasing fast food intake, consuming healthier snacks, limiting drinks that contain sugar, moderation in carbohydrate intake and reducing intake of cholesterol  Exercise recommendations include strength training exercises  Rationale for BMI follow-up plan is due to patient being overweight or obese  Depression Screening and Follow-up Plan: Patient was screened for depression during today's encounter  They screened negative with a PHQ-2 score of 0  Patient's shoes and socks removed  Right Foot/Ankle   Right Foot Inspection  Skin Exam: skin normal and skin intact  No dry skin, no warmth, no callus, no erythema, no maceration, no abnormal color, no pre-ulcer, no ulcer and no callus  Toe Exam: ROM and strength within normal limits  Sensory   Monofilament testing: diminished    Vascular  Capillary refills: < 3 seconds  The right DP pulse is 1+  Left Foot/Ankle  Left Foot Inspection  Skin Exam: skin normal and skin intact  No dry skin, no warmth, no erythema, no maceration, normal color, no pre-ulcer, no ulcer and no callus  Toe Exam: ROM and strength within normal limits  Sensory   Monofilament testing: diminished    Vascular  Capillary refills: < 3 seconds  The left DP pulse is 0       Assign Risk Category  No deformity present  Loss of protective sensation  Weak pulses  Risk: 2   Assessment/Plan:         Problem List Items Addressed This Visit        Respiratory    COPD with asthma ([facility] ) (Chronic)       Other    Medicare annual wellness visit, subsequent - Primary    Depression, recurrent ([facility] )      Other Visit Diagnoses     Encounter for screening mammogram for malignant neoplasm of breast        Relevant Orders    Mammo screening bilateral w 3d & cad    Screening for cervical cancer        Relevant Orders    Liquid-based pap, screening Subjective:      Patient ID: Miryam Madrigal is a 60 y o  female  1  Dm-2-her A1c was around 6 9  She denies any polyuria or polydipsia  No chest pain or increased dyspnea  No change in the vision  No claudication pain  No abdominal pain  She is mostly sedentary  Uses wheelchair often  2  ckd-3-GFR is in 35s  No hematuria  No increased edema  No abdominal distension  No nausea  3  htn-blood pressure stable  No headache dizziness or lightheadedness  No orthopnea  No syncope  No increased cough  4  Depression-on Cymbalta  No depressed mood  No hallucinations  No suicidal or homicidal ideations  The following portions of the patient's history were reviewed and updated as appropriate:   Past Medical History:  She has a past medical history of Anemia, Arthritis, Cancer of kidney ([facility] ), Chronic kidney disease, Diabetes mellitus ([facility] ), Disease of thyroid gland, HLD (hyperlipidemia), Hypertension, Ovarian cancer ([facility] ), and Pneumonia  ,  _______________________________________________________________________  Medical Problems:  does not have any pertinent problems on file ,  _______________________________________________________________________  Past Surgical History:   has a past surgical history that includes Nephrectomy (Right, 08/02/2018); Hysterectomy (06/01/2018); Gallbladder surgery (05/01/2004); Cholecystectomy; and CT guided perc drainage catheter placeme (5/16/2016)  ,  _______________________________________________________________________  Family History:  family history includes No Known Problems in her father and mother ,  _______________________________________________________________________  Social History:   reports that she quit smoking about 11 years ago  She has a 90 00 pack-year smoking history  She quit smokeless tobacco use about 10 years ago  She reports current drug use  Drug: Marijuana   She reports that she does not drink alcohol ,  _______________________________________________________________________  Allergies:  has No Known Allergies     _______________________________________________________________________  Current Outpatient Medications   Medication Sig Dispense Refill    albuterol (2 5 mg/3 mL) 0 083 % nebulizer solution INHALE CONTENTS OF 1 VIAL ( 3 MILLILITERS ) IN NEBULIZER BY MOUTH AND INTO THE LUNGS EVERY 6 HOURS IF NEEDED FOR WHEEZING OR SHORTNESS OF BREATH 75 mL 5    albuterol (PROVENTIL HFA,VENTOLIN HFA) 90 mcg/act inhaler Inhale 2 puffs every 4 (four) hours as needed for wheezing 8 5 g 2    amLODIPine (NORVASC) 5 mg tablet Take 1 tablet (5 mg total) by mouth daily 30 tablet 5    atorvastatin (LIPITOR) 10 mg tablet Take 1 tablet (10 mg total) by mouth daily 90 tablet 1    Blood Glucose Monitoring Suppl (Diagnovusace Pro Glucose Meter) SAVANNAH Use 2 (two) times a day Embrace glucometer, test twice daily 1 each 0    cholecalciferol (VITAMIN D3) 400 units tablet Take 1 tablet (400 Units total) by mouth daily 90 tablet 1    DULoxetine (CYMBALTA) 30 mg delayed release capsule Take 1 capsule (30 mg total) by mouth 2 (two) times a day 180 capsule 1    Embrace Lancets Ultra Thin 30G MISC Use 2 (two) times a day 100 each 5    famotidine (PEPCID) 20 mg tablet Take 1 tablet (20 mg total) by mouth daily 90 tablet 1    glucose blood (Diagnovusace Pro Glucose Test) test strip PLEASE RE-WRITE RX FOR EMBRACE PRO **EMBRACE REGUALR IS NOT AVAILABLE**  ---TEST TWICE DAILY-- 100 strip 5    glucose blood test strip Use 1 each daily Embrace test strips, test daily 50 strip 5    Insulin Pen Needle (Pen Needles) 32G X 4 MM MISC Use daily Embrace pen needles, test daily 50 each 5    levothyroxine 100 mcg tablet Take 1 tablet (100 mcg total) by mouth daily in the early morning 90 tablet 3    LORazepam (ATIVAN) 0 5 mg tablet Take 1 tablet (0 5 mg total) by mouth every 8 (eight) hours as needed for anxiety 30 tablet 2    metoprolol tartrate (LOPRESSOR) 50 mg tablet Take 1 tablet (50 mg total) by mouth 2 (two) times a day 180 tablet 1    vitamin B-12 (VITAMIN B-12) 500 mcg tablet Take 1 tablet (500 mcg total) by mouth daily 90 tablet 3    warfarin (Coumadin) 3 mg tablet Take 1 tablet (3 mg total) by mouth daily 90 tablet 1    ferrous sulfate 325 (65 Fe) mg tablet Take 325 mg by mouth daily with breakfast  (Patient not taking: Reported on 4/25/2022 )      pantoprazole (PROTONIX) 40 mg tablet Take 1 tablet (40 mg total) by mouth daily (Patient not taking: Reported on 4/25/2022 ) 90 tablet 1     No current facility-administered medications for this visit      _______________________________________________________________________  Review of Systems   Constitutional: Negative for appetite change, chills, diaphoresis, fatigue and fever  HENT: Negative for congestion, drooling and sinus pain  Eyes: Negative for discharge and itching  Respiratory: Positive for shortness of breath and wheezing  Negative for chest tightness  Cardiovascular: Negative for chest pain and palpitations  Gastrointestinal: Negative  Endocrine: Negative for polyphagia and polyuria  Genitourinary: Negative for difficulty urinating, dysuria, frequency and urgency  Musculoskeletal: Positive for gait problem  Skin: Negative for pallor and rash  Allergic/Immunologic: Negative for food allergies  Neurological: Positive for numbness  Negative for dizziness, seizures, speech difficulty, light-headedness and headaches  Hematological: Negative for adenopathy  Does not bruise/bleed easily  Psychiatric/Behavioral: Negative for agitation, confusion, decreased concentration, dysphoric mood and suicidal ideas           Objective:  Vitals:    04/25/22 1350   BP: 130/70   BP Location: Left arm   Patient Position: Sitting   Cuff Size: Standard   Pulse: 86   Resp: 16   Temp: (!) 97 1 °F (36 2 °C)   TempSrc: Temporal   SpO2: 97%   Weight: (!) 140 kg (309 lb)   Height: 5' 4" (1 626 m)     Body mass index is 53 04 kg/m²  Physical Exam  Vitals and nursing note reviewed  Constitutional:       Appearance: Normal appearance  She is well-developed  She is not ill-appearing or diaphoretic  HENT:      Head: Atraumatic  Eyes:      General:         Right eye: No discharge  Left eye: No discharge  Neck:      Thyroid: No thyromegaly  Cardiovascular:      Rate and Rhythm: Normal rate  Rhythm irregular  Pulses: Pulses are weak  Dorsalis pedis pulses are 1+ on the right side and 0 on the left side  Heart sounds: Normal heart sounds  Pulmonary:      Effort: Pulmonary effort is normal       Breath sounds: Normal breath sounds  No wheezing  Chest:      Chest wall: No tenderness  Abdominal:      General: Bowel sounds are normal  There is no distension  Palpations: Abdomen is soft  Tenderness: There is no abdominal tenderness  Musculoskeletal:         General: No tenderness  Cervical back: Neck supple  Right lower leg: No edema  Left lower leg: No edema  Feet:      Right foot:      Skin integrity: No ulcer, skin breakdown, erythema, warmth, callus or dry skin  Left foot:      Skin integrity: No ulcer, skin breakdown, erythema, warmth, callus or dry skin  Lymphadenopathy:      Cervical: No cervical adenopathy  Skin:     Capillary Refill: Capillary refill takes less than 2 seconds  Findings: No erythema or rash  Neurological:      Mental Status: She is alert and oriented to person, place, and time        Gait: Gait abnormal    Psychiatric:         Mood and Affect: Mood normal          Behavior: Behavior normal          Judgment: Judgment normal

## 2022-04-25 NOTE — PROGRESS NOTES
Patient's shoes and socks removed  Right Foot/Ankle   Right Foot Inspection  Skin Exam: skin normal, skin intact, dry skin, warmth, callus and callus  No erythema, no maceration, no abnormal color, no pre-ulcer and no ulcer  Toe Exam: ROM and strength within normal limits  Sensory   Monofilament testing: absent    Left Foot/Ankle  Left Foot Inspection  Skin Exam: skin normal, skin intact, dry skin, warmth and callus  No erythema, no maceration, normal color, no pre-ulcer and no ulcer  Toe Exam: ROM and strength within normal limits       Sensory   Monofilament testing: absent

## 2022-04-25 NOTE — ASSESSMENT & PLAN NOTE
Patient had a colonoscopy done last year in 2021    Order mammogram   Refer patient to gynecologist

## 2022-04-29 ENCOUNTER — ANTICOAG VISIT (OUTPATIENT)
Dept: FAMILY MEDICINE CLINIC | Facility: CLINIC | Age: 62
End: 2022-04-29

## 2022-04-29 ENCOUNTER — TELEPHONE (OUTPATIENT)
Dept: FAMILY MEDICINE CLINIC | Facility: CLINIC | Age: 62
End: 2022-04-29

## 2022-04-29 NOTE — TELEPHONE ENCOUNTER
----- Message from Marquita Maria sent at 4/29/2022 12:53 PM EDT -----    ----- Message -----  From: Uriah Silverio MD  Sent: 4/21/2022  10:26 AM EDT  To: Marquita Maria    Please call the patient regarding her abnormal result  INR is 3 4  Skip Coumadin for 1 day  Then resume 3 mg daily    Recheck in 1 week

## 2022-06-08 LAB
ALBUMIN SERPL-MCNC: 3.2 G/DL (ref 3.6–5.1)
ALBUMIN/GLOB SERPL: 0.9 (CALC) (ref 1–2.5)
ALP SERPL-CCNC: 136 U/L (ref 37–153)
ALT SERPL-CCNC: 9 U/L (ref 6–29)
AST SERPL-CCNC: 12 U/L (ref 10–35)
BILIRUB SERPL-MCNC: 0.3 MG/DL (ref 0.2–1.2)
BUN SERPL-MCNC: 19 MG/DL (ref 7–25)
BUN/CREAT SERPL: 13 (CALC) (ref 6–22)
CALCIUM SERPL-MCNC: 8.8 MG/DL (ref 8.6–10.4)
CALCIUM SERPL-MCNC: 8.8 MG/DL (ref 8.6–10.4)
CHLORIDE SERPL-SCNC: 101 MMOL/L (ref 98–110)
CHOLEST SERPL-MCNC: 148 MG/DL
CHOLEST/HDLC SERPL: 4.2 (CALC)
CK SERPL-CCNC: 30 U/L (ref 29–143)
CO2 SERPL-SCNC: 34 MMOL/L (ref 20–32)
CREAT SERPL-MCNC: 1.49 MG/DL (ref 0.5–0.99)
CREAT UR-MCNC: 58 MG/DL (ref 20–275)
ERYTHROCYTE [DISTWIDTH] IN BLOOD BY AUTOMATED COUNT: 14.2 % (ref 11–15)
FERRITIN SERPL-MCNC: 56 NG/ML (ref 16–288)
GLOBULIN SER CALC-MCNC: 3.6 G/DL (CALC) (ref 1.9–3.7)
GLUCOSE SERPL-MCNC: 172 MG/DL (ref 65–99)
HCT VFR BLD AUTO: 34.9 % (ref 35–45)
HDLC SERPL-MCNC: 35 MG/DL
HGB BLD-MCNC: 11 G/DL (ref 11.7–15.5)
IRON SATN MFR SERPL: 21 % (CALC) (ref 16–45)
IRON SERPL-MCNC: 60 MCG/DL (ref 45–160)
LDLC SERPL CALC-MCNC: 86 MG/DL (CALC)
MAGNESIUM SERPL-MCNC: 2 MG/DL (ref 1.5–2.5)
MCH RBC QN AUTO: 31.3 PG (ref 27–33)
MCHC RBC AUTO-ENTMCNC: 31.5 G/DL (ref 32–36)
MCV RBC AUTO: 99.1 FL (ref 80–100)
NONHDLC SERPL-MCNC: 113 MG/DL (CALC)
PHOSPHATE SERPL-MCNC: 2.9 MG/DL (ref 2.5–4.5)
PLATELET # BLD AUTO: 283 THOUSAND/UL (ref 140–400)
PMV BLD REES-ECKER: 10 FL (ref 7.5–12.5)
POTASSIUM SERPL-SCNC: 5 MMOL/L (ref 3.5–5.3)
PROT SERPL-MCNC: 6.8 G/DL (ref 6.1–8.1)
PROT UR-MCNC: 47 MG/DL (ref 5–24)
PROT/CREAT UR: 0.81 MG/MG CREAT (ref 0.02–0.16)
PROT/CREAT UR: 810 MG/G CREAT (ref 21–161)
PTH-INTACT SERPL-MCNC: 78 PG/ML (ref 16–77)
RBC # BLD AUTO: 3.52 MILLION/UL (ref 3.8–5.1)
SL AMB EGFR AFRICAN AMERICAN: 43 ML/MIN/1.73M2
SL AMB EGFR NON AFRICAN AMERICAN: 37 ML/MIN/1.73M2
SODIUM SERPL-SCNC: 140 MMOL/L (ref 135–146)
TIBC SERPL-MCNC: 292 MCG/DL (CALC) (ref 250–450)
TRIGL SERPL-MCNC: 170 MG/DL
WBC # BLD AUTO: 7.8 THOUSAND/UL (ref 3.8–10.8)

## 2022-07-09 PROBLEM — E11.21 DIABETIC NEPHROPATHY ASSOCIATED WITH TYPE 2 DIABETES MELLITUS (HCC): Status: ACTIVE | Noted: 2022-07-09

## 2022-07-12 DIAGNOSIS — J44.9 COPD WITH ASTHMA (HCC): ICD-10-CM

## 2022-07-12 RX ORDER — ALBUTEROL SULFATE 90 UG/1
AEROSOL, METERED RESPIRATORY (INHALATION)
Qty: 8.5 G | Refills: 2 | Status: SHIPPED | OUTPATIENT
Start: 2022-07-12 | End: 2022-07-27 | Stop reason: SDUPTHER

## 2022-07-24 DIAGNOSIS — N25.81 SECONDARY HYPERPARATHYROIDISM OF RENAL ORIGIN (HCC): ICD-10-CM

## 2022-07-24 DIAGNOSIS — D63.1 ANEMIA DUE TO STAGE 3B CHRONIC KIDNEY DISEASE (HCC): ICD-10-CM

## 2022-07-24 DIAGNOSIS — R80.1 PERSISTENT PROTEINURIA: ICD-10-CM

## 2022-07-24 DIAGNOSIS — E66.01 MORBID (SEVERE) OBESITY DUE TO EXCESS CALORIES (HCC): ICD-10-CM

## 2022-07-24 DIAGNOSIS — I12.9 HYPERTENSIVE CHRONIC KIDNEY DISEASE WITH STAGE 1 THROUGH STAGE 4 CHRONIC KIDNEY DISEASE, OR UNSPECIFIED CHRONIC KIDNEY DISEASE: ICD-10-CM

## 2022-07-24 DIAGNOSIS — N18.32 ANEMIA DUE TO STAGE 3B CHRONIC KIDNEY DISEASE (HCC): ICD-10-CM

## 2022-07-24 DIAGNOSIS — E61.1 IRON DEFICIENCY: ICD-10-CM

## 2022-07-24 DIAGNOSIS — N18.32 STAGE 3B CHRONIC KIDNEY DISEASE (HCC): ICD-10-CM

## 2022-07-24 DIAGNOSIS — E78.5 DYSLIPIDEMIA: ICD-10-CM

## 2022-07-24 DIAGNOSIS — Z90.5 H/O RIGHT NEPHRECTOMY: ICD-10-CM

## 2022-07-25 RX ORDER — AMLODIPINE BESYLATE 5 MG/1
TABLET ORAL
Qty: 30 TABLET | Refills: 5 | Status: SHIPPED | OUTPATIENT
Start: 2022-07-25 | End: 2022-07-27 | Stop reason: SDUPTHER

## 2022-07-27 ENCOUNTER — OFFICE VISIT (OUTPATIENT)
Dept: FAMILY MEDICINE CLINIC | Facility: CLINIC | Age: 62
End: 2022-07-27
Payer: MEDICARE

## 2022-07-27 VITALS
HEART RATE: 85 BPM | SYSTOLIC BLOOD PRESSURE: 130 MMHG | RESPIRATION RATE: 16 BRPM | TEMPERATURE: 97.8 F | BODY MASS INDEX: 50.02 KG/M2 | WEIGHT: 293 LBS | OXYGEN SATURATION: 93 % | DIASTOLIC BLOOD PRESSURE: 70 MMHG | HEIGHT: 64 IN

## 2022-07-27 DIAGNOSIS — Z90.5 H/O RIGHT NEPHRECTOMY: ICD-10-CM

## 2022-07-27 DIAGNOSIS — E03.9 HYPOTHYROIDISM, UNSPECIFIED TYPE: Chronic | ICD-10-CM

## 2022-07-27 DIAGNOSIS — I48.91 ATRIAL FIBRILLATION, UNSPECIFIED TYPE (HCC): ICD-10-CM

## 2022-07-27 DIAGNOSIS — I48.0 PAROXYSMAL ATRIAL FIBRILLATION (HCC): Chronic | ICD-10-CM

## 2022-07-27 DIAGNOSIS — B35.9 TINEA: ICD-10-CM

## 2022-07-27 DIAGNOSIS — K21.9 GASTROESOPHAGEAL REFLUX DISEASE WITHOUT ESOPHAGITIS: ICD-10-CM

## 2022-07-27 DIAGNOSIS — F33.9 DEPRESSION, RECURRENT (HCC): ICD-10-CM

## 2022-07-27 DIAGNOSIS — N25.81 SECONDARY HYPERPARATHYROIDISM OF RENAL ORIGIN (HCC): ICD-10-CM

## 2022-07-27 DIAGNOSIS — E61.1 IRON DEFICIENCY: ICD-10-CM

## 2022-07-27 DIAGNOSIS — F41.0 PANIC ATTACK: ICD-10-CM

## 2022-07-27 DIAGNOSIS — N18.32 ANEMIA DUE TO STAGE 3B CHRONIC KIDNEY DISEASE (HCC): ICD-10-CM

## 2022-07-27 DIAGNOSIS — R80.1 PERSISTENT PROTEINURIA: ICD-10-CM

## 2022-07-27 DIAGNOSIS — E66.9 DIABETES MELLITUS TYPE 2 IN OBESE (HCC): Primary | ICD-10-CM

## 2022-07-27 DIAGNOSIS — E78.5 DYSLIPIDEMIA: ICD-10-CM

## 2022-07-27 DIAGNOSIS — E11.69 DIABETES MELLITUS TYPE 2 IN OBESE (HCC): Primary | ICD-10-CM

## 2022-07-27 DIAGNOSIS — D63.1 ANEMIA DUE TO STAGE 3B CHRONIC KIDNEY DISEASE (HCC): ICD-10-CM

## 2022-07-27 DIAGNOSIS — Z23 ENCOUNTER FOR IMMUNIZATION: ICD-10-CM

## 2022-07-27 DIAGNOSIS — E11.21 DIABETIC NEPHROPATHY ASSOCIATED WITH TYPE 2 DIABETES MELLITUS (HCC): ICD-10-CM

## 2022-07-27 DIAGNOSIS — E66.01 MORBID (SEVERE) OBESITY DUE TO EXCESS CALORIES (HCC): ICD-10-CM

## 2022-07-27 DIAGNOSIS — I12.9 HYPERTENSIVE CHRONIC KIDNEY DISEASE WITH STAGE 1 THROUGH STAGE 4 CHRONIC KIDNEY DISEASE, OR UNSPECIFIED CHRONIC KIDNEY DISEASE: ICD-10-CM

## 2022-07-27 DIAGNOSIS — N18.32 STAGE 3B CHRONIC KIDNEY DISEASE (HCC): ICD-10-CM

## 2022-07-27 DIAGNOSIS — J96.11 CHRONIC RESPIRATORY FAILURE WITH HYPOXIA (HCC): ICD-10-CM

## 2022-07-27 DIAGNOSIS — J44.9 COPD WITH ASTHMA (HCC): ICD-10-CM

## 2022-07-27 PROCEDURE — 90677 PCV20 VACCINE IM: CPT

## 2022-07-27 PROCEDURE — G0009 ADMIN PNEUMOCOCCAL VACCINE: HCPCS

## 2022-07-27 PROCEDURE — 99215 OFFICE O/P EST HI 40 MIN: CPT | Performed by: INTERNAL MEDICINE

## 2022-07-27 RX ORDER — KETOCONAZOLE 20 MG/G
CREAM TOPICAL 2 TIMES DAILY
Qty: 30 G | Refills: 2 | Status: SHIPPED | OUTPATIENT
Start: 2022-07-27

## 2022-07-27 RX ORDER — OMEGA-3S/DHA/EPA/FISH OIL/D3 300MG-1000
400 CAPSULE ORAL DAILY
Qty: 90 TABLET | Refills: 1 | Status: SHIPPED | OUTPATIENT
Start: 2022-07-27 | End: 2022-10-26 | Stop reason: SDUPTHER

## 2022-07-27 RX ORDER — METOPROLOL TARTRATE 50 MG/1
50 TABLET, FILM COATED ORAL 2 TIMES DAILY
Qty: 180 TABLET | Refills: 1 | Status: SHIPPED | OUTPATIENT
Start: 2022-07-27 | End: 2022-10-26 | Stop reason: SDUPTHER

## 2022-07-27 RX ORDER — FAMOTIDINE 20 MG/1
20 TABLET, FILM COATED ORAL DAILY
Qty: 90 TABLET | Refills: 1 | Status: SHIPPED | OUTPATIENT
Start: 2022-07-27 | End: 2022-10-26 | Stop reason: SDUPTHER

## 2022-07-27 RX ORDER — ATORVASTATIN CALCIUM 10 MG/1
10 TABLET, FILM COATED ORAL DAILY
Qty: 90 TABLET | Refills: 1 | Status: SHIPPED | OUTPATIENT
Start: 2022-07-27 | End: 2022-10-26 | Stop reason: SDUPTHER

## 2022-07-27 RX ORDER — ALBUTEROL SULFATE 90 UG/1
2 AEROSOL, METERED RESPIRATORY (INHALATION) EVERY 4 HOURS PRN
Qty: 8.5 G | Refills: 2 | Status: SHIPPED | OUTPATIENT
Start: 2022-07-27 | End: 2022-10-26 | Stop reason: SDUPTHER

## 2022-07-27 RX ORDER — LORAZEPAM 0.5 MG/1
0.5 TABLET ORAL EVERY 8 HOURS PRN
Qty: 30 TABLET | Refills: 2 | Status: CANCELLED | OUTPATIENT
Start: 2022-07-27

## 2022-07-27 RX ORDER — AMLODIPINE BESYLATE 5 MG/1
5 TABLET ORAL DAILY
Qty: 90 TABLET | Refills: 2 | Status: SHIPPED | OUTPATIENT
Start: 2022-07-27 | End: 2022-10-26 | Stop reason: SDUPTHER

## 2022-07-27 RX ORDER — LORAZEPAM 0.5 MG/1
0.5 TABLET ORAL
Qty: 30 TABLET | Refills: 5 | Status: SHIPPED | OUTPATIENT
Start: 2022-07-27

## 2022-07-27 NOTE — ASSESSMENT & PLAN NOTE
Lab Results   Component Value Date    HGBA1C 6 9 03/09/2022   Recheck A1c CMP and lipid profile and TSH  Continue current meds    No hypoglycemia episodes

## 2022-07-27 NOTE — ASSESSMENT & PLAN NOTE
Patient has not done INR for past 2-3 months  She was given a prescription  Counseling given on important off INR monitoring  Risk of bleeding  I will order another new script  Advised patient to call me back if there is any issues with the blood draw

## 2022-07-27 NOTE — PROGRESS NOTES
Assessment/Plan:         Problem List Items Addressed This Visit        Digestive    GERD (gastroesophageal reflux disease)    Relevant Medications    famotidine (PEPCID) 20 mg tablet       Endocrine    Hypothyroid (Chronic)    Relevant Medications    metoprolol tartrate (LOPRESSOR) 50 mg tablet    Secondary hyperparathyroidism of renal origin ([facility] )    Relevant Medications    amLODIPine (NORVASC) 5 mg tablet    Diabetes mellitus type 2 in obese [facility]) - Primary       Lab Results   Component Value Date    HGBA1C 6 9 03/09/2022   Recheck A1c CMP and lipid profile and TSH  Continue current meds  No hypoglycemia episodes         Relevant Orders    HEMOGLOBIN A1C W/ EAG ESTIMATION    Comprehensive metabolic panel    TSH, 3rd generation    Diabetic nephropathy associated with type 2 diabetes mellitus ([facility] )       Respiratory    COPD with asthma (HCC) (Chronic)    Relevant Medications    albuterol (PROVENTIL HFA,VENTOLIN HFA) 90 mcg/act inhaler    Chronic respiratory failure with hypoxia (HCC)     Continue oxygen  Cardiovascular and Mediastinum    Paroxysmal atrial fibrillation (HCC) (Chronic)     Patient has not done INR for past 2-3 months  She was given a prescription  Counseling given on important off INR monitoring  Risk of bleeding  I will order another new script  Advised patient to call me back if there is any issues with the blood draw  Relevant Medications    amLODIPine (NORVASC) 5 mg tablet    metoprolol tartrate (LOPRESSOR) 50 mg tablet       Musculoskeletal and Integument    Tinea     On abdominal area and on left side of the face    Start ketoconazole and triamcinolone ointment         Relevant Medications    triamcinolone (KENALOG) 0 1 % ointment    ketoconazole (NIZORAL) 2 % cream       Genitourinary    Anemia due to stage 3b chronic kidney disease (HCC)    Relevant Medications    amLODIPine (NORVASC) 5 mg tablet    Chronic kidney disease, stage III (moderate) (HCC)    Relevant Medications    amLODIPine (NORVASC) 5 mg tablet    cholecalciferol (VITAMIN D3) 400 units tablet    Hypertensive chronic kidney disease with stage 1 through stage 4 chronic kidney disease, or unspecified chronic kidney disease    Relevant Medications    amLODIPine (NORVASC) 5 mg tablet       Other    Persistent proteinuria    Relevant Medications    amLODIPine (NORVASC) 5 mg tablet    Iron deficiency    Relevant Medications    amLODIPine (NORVASC) 5 mg tablet    Dyslipidemia     Check lipid profile  Relevant Medications    amLODIPine (NORVASC) 5 mg tablet    atorvastatin (LIPITOR) 10 mg tablet    H/O right nephrectomy    Relevant Medications    amLODIPine (NORVASC) 5 mg tablet    Morbid (severe) obesity due to excess calories (HCC)    Relevant Medications    amLODIPine (NORVASC) 5 mg tablet    Depression, recurrent (HCC)     Mood is stable  Tolerating medicine well         Relevant Medications    LORazepam (ATIVAN) 0 5 mg tablet      Other Visit Diagnoses     Panic attack        Relevant Medications    LORazepam (ATIVAN) 0 5 mg tablet    Atrial fibrillation, unspecified type (HCC)        Relevant Medications    amLODIPine (NORVASC) 5 mg tablet    metoprolol tartrate (LOPRESSOR) 50 mg tablet    Other Relevant Orders    Protime-INR            Subjective:      Patient ID: Baylee Harris is a 60 y o  female  1  Dm-2- no hypoglycemia symptoms or sign  No polyuria  No increased numbness or tingling  No increased weakness or fatigue  No change in the vision  No chest pain  She does have chronic respiratory failure  Not any worse  2  afib- no palpitations or lightheadedness  No syncope  No chest pain  No increased edema  No black stool or blood in the stool  No nasal bleeding  3  Copd with chronic respiratory failure  She has been on continuous oxygen  No increased dyspnea  No hemoptysis  No anorexia  No change in mucus color or amount  4  Depression- mood is usually stable    No increased anxiety  She needs lorazepam at bedtime  No suicidal or homicidal ideations  The following portions of the patient's history were reviewed and updated as appropriate:   Past Medical History:  She has a past medical history of Anemia, Arthritis, [facility] ), Chronic kidney disease, Diabetes mellitus ([facility] ), Disease of thyroid gland, HLD (hyperlipidemia), Hypertension, Ovarian cancer ([facility] ), and Pneumonia  ,  _______________________________________________________________________  Medical Problems:  does not have any pertinent problems on file ,  _______________________________________________________________________  Past Surgical History:   has a past surgical history that includes Nephrectomy (Right, 08/02/2018); Hysterectomy (06/01/2018); Gallbladder surgery (05/01/2004); Cholecystectomy; and CT guided perc drainage catheter placeme (5/16/2016)  ,  _______________________________________________________________________  Family History:  family history includes No Known Problems in her father and mother ,  _______________________________________________________________________  Social History:   reports that she quit smoking about 11 years ago  She has a 90 00 pack-year smoking history  She quit smokeless tobacco use about 10 years ago  She reports current drug use  Drug: Marijuana  She reports that she does not drink alcohol ,  _______________________________________________________________________  Allergies:  has No Known Allergies     _______________________________________________________________________  Current Outpatient Medications   Medication Sig Dispense Refill    albuterol (2 5 mg/3 mL) 0 083 % nebulizer solution INHALE CONTENTS OF 1 VIAL ( 3 MILLILITERS ) IN NEBULIZER BY MOUTH AND INTO THE LUNGS EVERY 6 HOURS IF NEEDED FOR WHEEZING OR SHORTNESS OF BREATH 75 mL 5    albuterol (PROVENTIL HFA,VENTOLIN HFA) 90 mcg/act inhaler Inhale 2 puffs every 4 (four) hours as needed for wheezing 8 5 g 2    amLODIPine (NORVASC) 5 mg tablet Take 1 tablet (5 mg total) by mouth daily 90 tablet 2    atorvastatin (LIPITOR) 10 mg tablet Take 1 tablet (10 mg total) by mouth daily 90 tablet 1    cholecalciferol (VITAMIN D3) 400 units tablet Take 1 tablet (400 Units total) by mouth daily 90 tablet 1    DULoxetine (CYMBALTA) 30 mg delayed release capsule Take 1 capsule (30 mg total) by mouth 2 (two) times a day 180 capsule 1    Embrace Lancets Ultra Thin 30G MISC Use 2 (two) times a day 100 each 5    famotidine (PEPCID) 20 mg tablet Take 1 tablet (20 mg total) by mouth daily 90 tablet 1    glucose blood (SpeedDateace Pro Glucose Test) test strip PLEASE RE-WRITE RX FOR EMBRACE PRO **EMBRACE REGUALR IS NOT AVAILABLE**  ---TEST TWICE DAILY-- 100 strip 5    glucose blood test strip Use 1 each daily Embrace test strips, test daily 50 strip 5    Insulin Pen Needle (Pen Needles) 32G X 4 MM MISC Use daily Embrace pen needles, test daily 50 each 5    ketoconazole (NIZORAL) 2 % cream Apply topically 2 (two) times a day 30 g 2    levothyroxine 100 mcg tablet Take 1 tablet (100 mcg total) by mouth daily in the early morning 90 tablet 3    LORazepam (ATIVAN) 0 5 mg tablet Take 1 tablet (0 5 mg total) by mouth daily at bedtime 30 tablet 5    metoprolol tartrate (LOPRESSOR) 50 mg tablet Take 1 tablet (50 mg total) by mouth 2 (two) times a day 180 tablet 1    triamcinolone (KENALOG) 0 1 % ointment Apply topically 2 (two) times a day 30 g 0    vitamin B-12 (VITAMIN B-12) 500 mcg tablet Take 1 tablet (500 mcg total) by mouth daily 90 tablet 3    warfarin (Coumadin) 3 mg tablet Take 1 tablet (3 mg total) by mouth daily 90 tablet 1    Blood Glucose Monitoring Suppl (SpeedDateace Pro Glucose Meter) SAVANNAH Use 2 (two) times a day Embrace glucometer, test twice daily 1 each 0    ferrous sulfate 325 (65 Fe) mg tablet Take 325 mg by mouth daily with breakfast  (Patient not taking: Reported on 4/25/2022 )      pantoprazole (PROTONIX) 40 mg tablet Take 1 tablet (40 mg total) by mouth daily (Patient not taking: Reported on 4/25/2022 ) 90 tablet 1     No current facility-administered medications for this visit      _______________________________________________________________________  Review of Systems   Constitutional: Negative for appetite change, chills, diaphoresis, fatigue and fever  HENT: Negative for congestion, drooling and sinus pain  Eyes: Negative for discharge and itching  Respiratory: Positive for shortness of breath  Negative for cough and chest tightness  Cardiovascular: Negative for chest pain, palpitations and leg swelling  Gastrointestinal: Negative  Endocrine: Negative for polyphagia and polyuria  Genitourinary: Negative for difficulty urinating, dysuria, frequency and urgency  Skin: Positive for rash  Negative for pallor  Allergic/Immunologic: Negative for food allergies  Neurological: Negative for dizziness, seizures, speech difficulty, light-headedness, numbness and headaches  Hematological: Negative for adenopathy  Does not bruise/bleed easily  Psychiatric/Behavioral: Negative for agitation, confusion and decreased concentration  Objective:  Vitals:    07/27/22 1331   BP: 130/70   BP Location: Left arm   Patient Position: Sitting   Cuff Size: Large   Pulse: 85   Resp: 16   Temp: 97 8 °F (36 6 °C)   TempSrc: Temporal   SpO2: 93%   Weight: (!) 145 kg (319 lb 12 8 oz)   Height: 5' 4" (1 626 m)     Body mass index is 54 89 kg/m²  Physical Exam  Vitals and nursing note reviewed  Constitutional:       Appearance: Normal appearance  She is well-developed  She is obese  She is not ill-appearing or diaphoretic  HENT:      Head: Atraumatic  Eyes:      General:         Right eye: No discharge  Left eye: No discharge  Neck:      Thyroid: No thyromegaly  Cardiovascular:      Rate and Rhythm: Normal rate  Rhythm irregular  Pulses: Normal pulses        Heart sounds: Normal heart sounds  Pulmonary:      Effort: Pulmonary effort is normal       Breath sounds: No wheezing  Chest:      Chest wall: No tenderness  Abdominal:      General: Bowel sounds are normal       Palpations: Abdomen is soft  Tenderness: There is no abdominal tenderness  Musculoskeletal:         General: No tenderness  Cervical back: Neck supple  Right lower leg: Edema present  Left lower leg: Edema (trace bilateral) present  Lymphadenopathy:      Cervical: No cervical adenopathy  Skin:     Capillary Refill: Capillary refill takes less than 2 seconds  Findings: Rash (Likely a tinea rash on abdominal area and on left side of the face ) present  No erythema  Neurological:      Mental Status: She is alert and oriented to person, place, and time     Psychiatric:         Mood and Affect: Mood normal          Behavior: Behavior normal          Judgment: Judgment normal

## 2022-07-28 DIAGNOSIS — E03.8 OTHER SPECIFIED HYPOTHYROIDISM: ICD-10-CM

## 2022-07-29 RX ORDER — LEVOTHYROXINE SODIUM 0.1 MG/1
TABLET ORAL
Qty: 90 TABLET | Refills: 3 | Status: SHIPPED | OUTPATIENT
Start: 2022-07-29

## 2022-08-02 NOTE — PROGRESS NOTES
RENAL FOLLOW UP NOTE: td     ASSESSMENT AND PLAN:  1  Jesus Manuel Mccarty IXJSY 8 :  · Etiology:  Right radical nephrectomy July 15, 2016 for renal cell CA/hypertensive nephrosclerosis/arteriolar nephrosclerosis/diabetic nephropathy/?  Morbid obesity with?  FSGS  · Baseline creatinine:  new baseline appears to be from 1 8-as high as 2 4  · Current creatinine:  1 49  · Urine protein creatinine ratio:  0 81 g at goal  · UA microscopic no proteinuria, trace intact heme from 05/13/2020  Recommendations:  · Treat hypertension-please see below  · Treat dyslipidemia-please see below  · Maintain proteinuria less than 1 g or as low as possible  · Avoid nephrotoxic agents such as NSAIDs, patient counseled as such    2   Volume:  · Current volume:  Euvolemic  · Treatment:  · Only use as needed furosemide if swelling recurs  3   Hypertension:   Home blood pressures:    None:  120-130/60-70     · Goal blood pressure:  Less than 130/80  Recommendations:  ·   push nonmedical regimen including weight loss, and any form of exercise patient can not tolerate; avoidance of salt; patient counseled as such  · Medication changes today:  · None at this time pending home readings  4   Electrolytes:    · Chronic metabolic alkalosis most likely from primary CO2 retention,, doing well with a bicarbonate of 34   · Borderline hyperkalemia at 5 0 stable    5   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:  · Calcium/magnesium/phosphorus:  All acceptable  · PTH intact:  78 at goal with only minimal elevation  · Vitamin-D:  47 at goal    6   Dyslipidemia:  · Goal LDL:  Less than 100  · Current lipid profile:  LDL 86/HDL 35/triglycerides 170    Recommendations:  At goal so no changes    7   Anemia:    -Current hemoglobin 11 0 reasonably stable  -iron studies:  Saturation 21% but ferritin only 56 but cannot tolerate oral iron because of GI side effects; if needed could give intravenous iron  -B12 and folate deficiency being treated  -multiple myeloma was ruled out in January  -GI evaluation in January of 2018:   colonoscopy demonstrated internal/external hemorrhoids; also colonic polyps which were removed  Ofelia Claros was a suboptimal colonic prep   Felt she may require repeat colonoscopy in 3-6 months  EGD demonstrated small hiatal hernia  Recommendations:  -GI follow-up with Dr Garza     8   Other problems:  · Right radical nephrectomy for renal cell CA July 15, 2016  · 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 2, 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  · Morbid obesity  · Atrial fibrillation  · Cellulitis of the left leg back in January treated with intravenous antibiotics  · COPD on iron  · EGD involving multiple joints  · Hypothyroidism on supplement  · History of gait dysfunction using motorized scooter to ambulate  · Diabetes mellitus/diabetic neuropathy  · Admission 8/2021 for abdominal pain-workup is CT/EGD/colonoscopy/push enteroscopy all negative except for gastritis-esophagitis; the patient's symptoms resolved spontaneously  Patient with a ventral hernia which she deferred in terms of surgical revision, will follow-up with surgery       GI health maintenance:  Please see above under other problems  PATIENT INSTRUCTIONS:    Patient Instructions   1  Medication changes today:   No medication changes today        2  Please take 1 week a blood pressure readings  at this time     AS FOLLOWS  MORNING AND EVENING, SITTING as follows:  · TAKE THE MORNING READINGS BEFORE ANY MEDICATIONS AND WHEN YOU ARE RELAXED FOR SEVERAL MINUTES  · TAKE THE EVENING READINGS:  BETWEEN 7-10 P M ; PRIOR TO ANY MEDICATIONS; AT LEAST IN OUR  FROM DINNER; AND CERTAINLY AFTER RELAXING FOR A FEW MINUTES  · PLEASE INCLUDE HEART RATE WITH YOUR BLOOD PRESSURE READINGS  · When taking standing readings, keep your arm supported at heart level and not dangling  · Make sure you are sitting with your back supported and feet on the ground and do not cross your legs or feet  · Make sure you have not taken any coffee or caffeine products or exercised or smoke cigarettes at least 30 minutes before taking your blood pressure  Then please mail these readings into the office    3  Please go for nonfasting lab work in 3 months      4  Follow-up in 6  months   Please bring in 1 week a blood pressure readings morning evening, sitting and standing is outlined above   PLEASE BRING AN YOUR BLOOD PRESSURE MACHINE TO CORRELATE WITH THE OFFICE MACHINE AT THIS NEXT SCHEDULED VISIT   Please go for fasting lab work 1-2 weeks prior to your appointment      6  General instructions:   AVOID SALT BUT NOT ADDING AN READING LABELS TO MAKE SURE THERE IS LOW-SALT IN THE FOOD THAT YOU ARE EATING  o Goal is less than 2 g of sodium intake or less than 5 g of sodium chloride intake per day     Avoid nonsteroidal anti-inflammatory drugs such as Naprosyn, ibuprofen, Aleve, Advil, Celebrex, Meloxicam (Mobic) etc   You can use Tylenol as needed if you do not have any liver condition to be concerned about     Avoid medications such as Sudafed or decongestants and antihistamines that contained the D component which is the decongestant  You can take antihistamines without the decongestant or D component   Try to avoid medications such as pantoprazole or  Protonix/Nexium or Esomeprazole)/Prilosec or omeprazole/Prevacid or lansoprazole/AcipHex or Rabeprazole  If you are able to, use Pepcid as this is safer for your kidneys   Try to exercise at least 30 minutes 3 days a week to begin with with an ultimate goal of 5 days a week for at least 30 minutes     Try to lose 5-10 lb by your next visit     Please do not drink more than 2 glasses of alcohol/wine on a daily basis as this may contribute to your high blood pressure  Potassium Content of Foods List   WHAT YOU NEED TO KNOW:   Potassium is a mineral that is found in most foods  Potassium helps to balance fluids and minerals in your body   It also helps your body maintain a normal blood pressure  Potassium helps your muscles contract and your nerves function normally  DISCHARGE INSTRUCTIONS:   Why you may need to change the amount of potassium you eat:   · You may need more potassium  if you have hypokalemia (low potassium levels) or high blood pressure  You may also need more potassium if you are taking diuretics  Diuretics and certain medicines cause your body to lose potassium  · You may need less potassium  in your diet if you have hyperkalemia (high potassium levels) or kidney disease  Potassium content of fruit:  The amount of potassium in milligrams (mg) contained in each fruit or serving of fruit is listed beside the item  1  High-potassium foods (more than 200 mg per serving):      ? 1 medium banana (425)    ? ½ of a papaya (390)    ? ½ cup of prune juice (370)    ? ¼ cup of raisins (270)    ? 1 medium zara (325) or kiwi (240)    ? 1 small orange (240) or ½ cup of orange juice (235)    ? ½ cup of cubed cantaloupe (215) or diced honeydew melon (200)    ? 1 medium pear (200)    2  Medium-potassium foods (50 to 200 mg per serving):      ? 1 medium peach (185)    ? 1 small apple or ½ cup of apple juice (150)    ? ½ cup of peaches canned in juice (120)    ? ½ cup of canned pineapple (100)    ? ½ cup of fresh, sliced strawberries (793)    ? ½ cup of watermelon (85)    3  Low-potassium foods (less than 50 mg per serving):      ? ½ cup of cranberries (45) or cranberry juice cocktail (20)    ? ½ cup of nectar of papaya, zara, or pear (35)    Potassium content of vegetables:   1  High-potassium foods (more than 200 mg per serving):      ? 1 medium baked potato, with skin (925)    ? 1 baked medium sweet potato, with skin (450)    ? ½ cup of tomato or vegetable juice (275), or 1 medium raw tomato (290)    ? ½ cup of mushrooms (280)    ? ½ cup of fresh brussels sprouts (250)    ? ½ cup of cooked zucchini (220) or winter squash (250)    ?  ¼ of a medium avocado (245)    ? ½ cup of broccoli (230)    2  Medium-potassium foods (50 to 200 mg per serving):      ? ½ cup of corn (195)    ? ½ cup of fresh or cooked carrots (180)    ? ½ cup of fresh cauliflower (150)    ? ½ cup of asparagus (155)    ? ½ cup of canned peas (90)     ? 1 cup of lettuce, all types (100)    ? ½ cup of fresh green beans (90)    ? ½ cup of frozen green beans (85)    ? ½ cup of cucumber (80)    Potassium content of protein foods:   1  High-potassium foods (more than 200 mg per serving):      ? ½ cup of cooked perez beans (400) or lentils (365)    ? 1 cup of soy milk (300)    ? 3 ounces of baked or broiled salmon (319)    ? 3 ounces of roasted turkey, dark meat (250)    ? ¼ cup of sunflower seeds (241)    ? 3 ounces of cooked lean beef (224)    ? 2 tablespoons of smooth peanut butter (210)    2  Medium-potassium foods (50 to 200 mg per serving):      ? 1 ounce of salted peanuts, almonds, or cashews (200)    ? 1 large egg (60 mg)    Potassium content of dairy foods:   1  High-potassium foods (more than 200 mg per serving):      ? 6 ounces of yogurt (260 to 435)    ? 1 cup of nonfat, low-fat, or whole milk (350 to 380)    2  Medium-potassium foods (50 to 200 mg per serving):      ? ½ cup of ricotta cheese (154)    ? ½ cup of vanilla ice cream (131)    ? ½ cup of low-fat (2%) cottage cheese (110)    3  Low-potassium foods (less than 50 mg per serving):      ? 1 ounce of cheese (20 to 30)      Potassium content of grains:   · 1 slice of white bread (30)    · ½ cup of white or brown rice (50)    · ½ cup of spaghetti or macaroni (30)    · 1 flour or corn tortilla (50)    · 1 four-inch waffle (50)    Potassium content of other foods:   · 1 tablespoon of molasses (295)    · 1½ ounces of chocolate (165)    · Some salt substitutes may contain a high amount of potassium  Check the food label to find the amount of potassium it contains      © Copyright [employer] 2022 Information is for End User's use only and may not be sold, redistributed or otherwise used for commercial purposes  All illustrations and images included in CareNotes® are the copyrighted property of [employer]  or Jacob Tolentino   The above information is an  only  It is not intended as medical advice for individual conditions or treatments  Talk to your doctor, nurse or pharmacist before following any medical regimen to see if it is safe and effective for you  Subjective: There has been no hospitalizations or acute illnesses since last visit  The patient overall is feeling well  No fevers, chills, or cough or colds  Good appetite and good energy  No hematuria, dysuria, voiding symptoms or foamy urine  No gastrointestinal symptoms  No chest pain, chronic dyspnea on exertion; on oxygen chronically, in 1 leg swelling except for left lower extremity which is chronic  No headaches, occasional dizziness or lightheadedness, but improved thought related to rapid increase in getting up from a sitting position  Blood pressure medications:   Metoprolol tartrate 50 mg twice a day   Amlodipine 5 mg daily      ROS:  See HPI, otherwise review of systems as completely reviewed with the patient are negative    Past Medical History:   Diagnosis Date    Anemia     Arthritis     Cancer of kidney ([address]     Chronic kidney disease     Diabetes mellitus ([address]     Disease of thyroid gland     HLD (hyperlipidemia)     Hypertension     Ovarian cancer ([address]     Pneumonia      Past Surgical History:   Procedure Laterality Date    CHOLECYSTECTOMY      CT GUIDED PERC DRAINAGE CATHETER PLACEMENT  5/16/2016    GALLBLADDER SURGERY  05/01/2004    HYSTERECTOMY  06/01/2018    NEPHRECTOMY Right 08/02/2018     Family History   Problem Relation Age of Onset    No Known Problems Mother     No Known Problems Father       reports that she quit smoking about 11 years ago  She has a 90 00 pack-year smoking history   She quit smokeless tobacco use about 10 years ago  She reports current drug use  Drug: Marijuana  She reports that she does not drink alcohol  I COMPLETELY REVIEWED THE PAST MEDICAL HISTORY/PAST SURGICAL HISTORY/SOCIAL HISTORY/FAMILY HISTORY/AND MEDICATIONS  AND UPDATED ALL    Objective:     Vitals:   BP sitting on left forearm:  116/76 with a heart rate of 84 and irregular    Weight (last 2 days)     Date/Time Weight    08/11/22 1341 145 (319)     Weight: per patient at 08/11/22 1341        Wt Readings from Last 3 Encounters:   08/11/22 (!) 145 kg (319 lb)   07/27/22 (!) 145 kg (319 lb 12 8 oz)   04/25/22 (!) 140 kg (309 lb)       Body mass index is 54 76 kg/m²      Physical Exam: General:  No acute distress/morbidly obese, in chair  Skin:  Psoriatic rash intermittently  Eyes:  No scleral icterus, noninjected, no discharge from eyes  ENT:  Moist mucous membranes  Neck:  Supple, no jugular venous distention, trachea is midline, no lymphadenopathy and no thyromegaly  Back   No CVAT  Chest:  Clear to auscultation and percussion, good respiratory effort  CVS:  Irregularly irregular rhythm without a rub, or gallops or murmurs  Abdomen:  Obese, Soft and nontender with normal bowel sounds  Extremities:  No cyanosis and trace left lower extremity edema with significant venous stasis changes, no arthritic changes, normal range of motion  Neuro:  Grossly intact  Psych:  Alert, oriented x3 and appropriate      Medications:    Current Outpatient Medications:     albuterol (2 5 mg/3 mL) 0 083 % nebulizer solution, INHALE CONTENTS OF 1 VIAL ( 3 MILLILITERS ) IN NEBULIZER BY MOUTH AND INTO THE LUNGS EVERY 6 HOURS IF NEEDED FOR WHEEZING OR SHORTNESS OF BREATH, Disp: 75 mL, Rfl: 5    albuterol (PROVENTIL HFA,VENTOLIN HFA) 90 mcg/act inhaler, Inhale 2 puffs every 4 (four) hours as needed for wheezing, Disp: 8 5 g, Rfl: 2    amLODIPine (NORVASC) 5 mg tablet, Take 1 tablet (5 mg total) by mouth daily, Disp: 90 tablet, Rfl: 2    atorvastatin (LIPITOR) 10 mg tablet, Take 1 tablet (10 mg total) by mouth daily, Disp: 90 tablet, Rfl: 1    Blood Glucose Monitoring Suppl (Embrace Pro Glucose Meter) SAVANNAH, Use 2 (two) times a day Embrace glucometer, test twice daily, Disp: 1 each, Rfl: 0    cholecalciferol (VITAMIN D3) 400 units tablet, Take 1 tablet (400 Units total) by mouth daily, Disp: 90 tablet, Rfl: 1    DULoxetine (CYMBALTA) 30 mg delayed release capsule, Take 1 capsule (30 mg total) by mouth 2 (two) times a day, Disp: 180 capsule, Rfl: 1    Embrace Lancets Ultra Thin 30G MISC, Use 2 (two) times a day, Disp: 100 each, Rfl: 5    famotidine (PEPCID) 20 mg tablet, Take 1 tablet (20 mg total) by mouth daily, Disp: 90 tablet, Rfl: 1    ferrous sulfate 325 (65 Fe) mg tablet, Take 325 mg by mouth 2 (two) times a week, Disp: , Rfl:     glucose blood (Embrace Pro Glucose Test) test strip, PLEASE RE-WRITE RX FOR EMBRACE PRO **EMBRACE REGUALR IS NOT AVAILABLE**  ---TEST TWICE DAILY--, Disp: 100 strip, Rfl: 5    glucose blood test strip, Use 1 each daily Embrace test strips, test daily, Disp: 50 strip, Rfl: 5    Insulin Pen Needle (Pen Needles) 32G X 4 MM MISC, Use daily Embrace pen needles, test daily, Disp: 50 each, Rfl: 5    ketoconazole (NIZORAL) 2 % cream, Apply topically 2 (two) times a day, Disp: 30 g, Rfl: 2    levothyroxine 100 mcg tablet, take 1 tablet by mouth in THE EARLY MORNING, Disp: 90 tablet, Rfl: 3    LORazepam (ATIVAN) 0 5 mg tablet, Take 1 tablet (0 5 mg total) by mouth daily at bedtime, Disp: 30 tablet, Rfl: 5    metoprolol tartrate (LOPRESSOR) 50 mg tablet, Take 1 tablet (50 mg total) by mouth 2 (two) times a day, Disp: 180 tablet, Rfl: 1    triamcinolone (KENALOG) 0 1 % ointment, Apply topically 2 (two) times a day, Disp: 30 g, Rfl: 0    vitamin B-12 (VITAMIN B-12) 500 mcg tablet, Take 1 tablet (500 mcg total) by mouth daily, Disp: 90 tablet, Rfl: 3    warfarin (Coumadin) 3 mg tablet, Take 1 tablet (3 mg total) by mouth daily, Disp: 90 tablet, Rfl: 1    pantoprazole (PROTONIX) 40 mg tablet, Take 1 tablet (40 mg total) by mouth daily (Patient not taking: No sig reported), Disp: 90 tablet, Rfl: 1    Lab, Imaging and other studies: I have personally reviewed pertinent labs  Laboratory Results:  Results for orders placed or performed in visit on 08/05/22   Protime-INR   Result Value Ref Range    INR 3 40 (A) 0 84 - 1 19             Invalid input(s): ALBUMIN      Radiology review:   chest X-ray    Ultrasound      Portions of the record may have been created with voice recognition software  Occasional wrong word or "sound a like" substitutions may have occurred due to the inherent limitations of voice recognition software  Read the chart carefully and recognize, using context, where substitutions have occurred

## 2022-08-04 LAB
INR PPP: 3.4
INR PPP: 3.4 (ref 0.84–1.19)
PROTHROMBIN TIME: 31.4 SEC (ref 9–11.5)

## 2022-08-05 ENCOUNTER — ANTICOAG VISIT (OUTPATIENT)
Dept: FAMILY MEDICINE CLINIC | Facility: CLINIC | Age: 62
End: 2022-08-05

## 2022-08-05 ENCOUNTER — TELEPHONE (OUTPATIENT)
Dept: FAMILY MEDICINE CLINIC | Facility: CLINIC | Age: 62
End: 2022-08-05

## 2022-08-05 NOTE — PROGRESS NOTES
As per Dr Martinez Estimsimón, hold coumain 08/05 and then resume 3 mg except skip on Fridays and recheck INR in 2 weeks   Called and spoke to Steven, gave instruction  Ann Eid

## 2022-08-05 NOTE — TELEPHONE ENCOUNTER
Called and spoke to Steven, As per Dr Vikki Fofana, Skip coumadin 08/05 and resume 3 mg daily except Skip on Fridays   Recheck INR in 2 weeks  Lola García

## 2022-08-05 NOTE — TELEPHONE ENCOUNTER
----- Message from Manoj Lee MD sent at 8/5/2022 11:59 AM EDT -----  Please call the patient regarding her abnormal result  INR 3 4  Please check with patient how much Coumadin she is taking?

## 2022-08-11 ENCOUNTER — OFFICE VISIT (OUTPATIENT)
Dept: NEPHROLOGY | Facility: CLINIC | Age: 62
End: 2022-08-11
Payer: MEDICARE

## 2022-08-11 ENCOUNTER — TELEPHONE (OUTPATIENT)
Dept: NEPHROLOGY | Facility: CLINIC | Age: 62
End: 2022-08-11

## 2022-08-11 VITALS — BODY MASS INDEX: 50.02 KG/M2 | WEIGHT: 293 LBS | HEIGHT: 64 IN

## 2022-08-11 DIAGNOSIS — E78.5 DYSLIPIDEMIA: ICD-10-CM

## 2022-08-11 DIAGNOSIS — N18.32 STAGE 3B CHRONIC KIDNEY DISEASE (HCC): ICD-10-CM

## 2022-08-11 DIAGNOSIS — N18.32 ANEMIA DUE TO STAGE 3B CHRONIC KIDNEY DISEASE (HCC): ICD-10-CM

## 2022-08-11 DIAGNOSIS — Z90.5 H/O RIGHT NEPHRECTOMY: ICD-10-CM

## 2022-08-11 DIAGNOSIS — I12.9 HYPERTENSIVE CHRONIC KIDNEY DISEASE WITH STAGE 1 THROUGH STAGE 4 CHRONIC KIDNEY DISEASE, OR UNSPECIFIED CHRONIC KIDNEY DISEASE: Primary | ICD-10-CM

## 2022-08-11 DIAGNOSIS — D63.1 ANEMIA DUE TO STAGE 3B CHRONIC KIDNEY DISEASE (HCC): ICD-10-CM

## 2022-08-11 DIAGNOSIS — E11.21 DIABETIC NEPHROPATHY ASSOCIATED WITH TYPE 2 DIABETES MELLITUS (HCC): ICD-10-CM

## 2022-08-11 DIAGNOSIS — N25.81 SECONDARY HYPERPARATHYROIDISM OF RENAL ORIGIN (HCC): ICD-10-CM

## 2022-08-11 DIAGNOSIS — R80.1 PERSISTENT PROTEINURIA: ICD-10-CM

## 2022-08-11 LAB
ALBUMIN SERPL-MCNC: 3.3 G/DL (ref 3.6–5.1)
ALBUMIN/GLOB SERPL: 0.9 (CALC) (ref 1–2.5)
ALP SERPL-CCNC: 134 U/L (ref 37–153)
ALT SERPL-CCNC: 8 U/L (ref 6–29)
AST SERPL-CCNC: 11 U/L (ref 10–35)
BILIRUB SERPL-MCNC: 0.4 MG/DL (ref 0.2–1.2)
BUN SERPL-MCNC: 20 MG/DL (ref 7–25)
BUN/CREAT SERPL: 12 (CALC) (ref 6–22)
CALCIUM SERPL-MCNC: 8.7 MG/DL (ref 8.6–10.4)
CHLORIDE SERPL-SCNC: 100 MMOL/L (ref 98–110)
CHOLEST SERPL-MCNC: 133 MG/DL
CHOLEST/HDLC SERPL: 4.8 (CALC)
CK SERPL-CCNC: 38 U/L (ref 29–143)
CO2 SERPL-SCNC: 34 MMOL/L (ref 20–32)
CREAT SERPL-MCNC: 1.64 MG/DL (ref 0.5–1.05)
CREAT UR-MCNC: 63 MG/DL (ref 20–275)
ERYTHROCYTE [DISTWIDTH] IN BLOOD BY AUTOMATED COUNT: 14 % (ref 11–15)
FERRITIN SERPL-MCNC: 54 NG/ML (ref 16–288)
GFR/BSA.PRED SERPLBLD CYS-BASED-ARV: 35 ML/MIN/1.73M2
GLOBULIN SER CALC-MCNC: 3.7 G/DL (CALC) (ref 1.9–3.7)
GLUCOSE SERPL-MCNC: 133 MG/DL (ref 65–99)
HCT VFR BLD AUTO: 32.2 % (ref 35–45)
HDLC SERPL-MCNC: 28 MG/DL
HGB BLD-MCNC: 11.1 G/DL (ref 11.7–15.5)
IRON SATN MFR SERPL: 16 % (CALC) (ref 16–45)
IRON SERPL-MCNC: 47 MCG/DL (ref 45–160)
LDLC SERPL CALC-MCNC: 79 MG/DL (CALC)
MAGNESIUM SERPL-MCNC: 2.3 MG/DL (ref 1.5–2.5)
MCH RBC QN AUTO: 32.7 PG (ref 27–33)
MCHC RBC AUTO-ENTMCNC: 34.5 G/DL (ref 32–36)
MCV RBC AUTO: 95 FL (ref 80–100)
NONHDLC SERPL-MCNC: 105 MG/DL (CALC)
PHOSPHATE SERPL-MCNC: 2.4 MG/DL (ref 2.5–4.5)
PLATELET # BLD AUTO: 308 THOUSAND/UL (ref 140–400)
PMV BLD REES-ECKER: 10 FL (ref 7.5–12.5)
POTASSIUM SERPL-SCNC: 4.8 MMOL/L (ref 3.5–5.3)
PROT SERPL-MCNC: 7 G/DL (ref 6.1–8.1)
PROT UR-MCNC: 40 MG/DL (ref 5–24)
PROT/CREAT UR: 0.64 MG/MG CREAT (ref 0.02–0.16)
PROT/CREAT UR: 635 MG/G CREAT (ref 21–161)
RBC # BLD AUTO: 3.39 MILLION/UL (ref 3.8–5.1)
SODIUM SERPL-SCNC: 139 MMOL/L (ref 135–146)
TIBC SERPL-MCNC: 299 MCG/DL (CALC) (ref 250–450)
TRIGL SERPL-MCNC: 160 MG/DL
WBC # BLD AUTO: 8.7 THOUSAND/UL (ref 3.8–10.8)

## 2022-08-11 PROCEDURE — 99214 OFFICE O/P EST MOD 30 MIN: CPT | Performed by: INTERNAL MEDICINE

## 2022-08-11 NOTE — LETTER
August 11, 2022     Russ Ng, [address] [facility] [place], [facility] by [facility] [address] [address]    Patient: Sendy Sarkar   YOB: 1960   Date of Visit: 8/11/2022       Dear Dr Jose Juan Mahoney: Thank you for referring Sugey Turner to me for evaluation  Below are my notes for this consultation  If you have questions, please do not hesitate to call me  I look forward to following your patient along with you           Sincerely,        Bev Koch MD        CC: No Recipients  Bev Koch MD  8/11/2022  1:59 PM  Sign when Signing Visit  RENAL FOLLOW UP NOTE: td     ASSESSMENT AND PLAN:  1  Olivia VALENTIN 7 :  · Etiology:  Right radical nephrectomy July 15, 2016 for renal cell CA/hypertensive nephrosclerosis/arteriolar nephrosclerosis/diabetic nephropathy/?  Morbid obesity with?  FSGS  · Baseline creatinine:  new baseline appears to be from 1 8-as high as 2 4  · Current creatinine:  1 49  · Urine protein creatinine ratio:  0 81 g at goal  · UA microscopic no proteinuria, trace intact heme from 05/13/2020  Recommendations:  · Treat hypertension-please see below  · Treat dyslipidemia-please see below  · Maintain proteinuria less than 1 g or as low as possible  · Avoid nephrotoxic agents such as NSAIDs, patient counseled as such    2   Volume:  · Current volume:  Euvolemic  · Treatment:  · Only use as needed furosemide if swelling recurs  3   Hypertension:   Home blood pressures:    None:  120-130/60-70     · Goal blood pressure:  Less than 130/80  Recommendations:  ·   push nonmedical regimen including weight loss, and any form of exercise patient can not tolerate; avoidance of salt; patient counseled as such  · Medication changes today:  · None at this time pending home readings  4   Electrolytes:    · Chronic metabolic alkalosis most likely from primary CO2 retention,, doing well with a bicarbonate of 34   · Borderline hyperkalemia at 5 0 stable    5   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:  · Calcium/magnesium/phosphorus:  All acceptable  · PTH intact:  78 at goal with only minimal elevation  · Vitamin-D:  47 at goal    6   Dyslipidemia:  · Goal LDL:  Less than 100  · Current lipid profile:  LDL 86/HDL 35/triglycerides 170    Recommendations:  At goal so no changes    7   Anemia:    -Current hemoglobin 11 0 reasonably stable  -iron studies:  Saturation 21% but ferritin only 56 but cannot tolerate oral iron because of GI side effects; if needed could give intravenous iron  -B12 and folate deficiency being treated  -multiple myeloma was ruled out in January  -GI evaluation in January of 2018:   colonoscopy demonstrated internal/external hemorrhoids; also colonic polyps which were removed  Jerry Barajas was a suboptimal colonic prep   Felt she may require repeat colonoscopy in 3-6 months  EGD demonstrated small hiatal hernia  Recommendations:  -GI follow-up with Dr Garza     8   Other problems:  · Right radical nephrectomy for renal cell CA July 15, 2016  · 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 2, 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  · Morbid obesity  · Atrial fibrillation  · Cellulitis of the left leg back in January treated with intravenous antibiotics  · COPD on iron  · EGD involving multiple joints  · Hypothyroidism on supplement  · History of gait dysfunction using motorized scooter to ambulate  · Diabetes mellitus/diabetic neuropathy  · Admission 8/2021 for abdominal pain-workup is CT/EGD/colonoscopy/push enteroscopy all negative except for gastritis-esophagitis; the patient's symptoms resolved spontaneously  Patient with a ventral hernia which she deferred in terms of surgical revision, will follow-up with surgery       GI health maintenance:  Please see above under other problems  PATIENT INSTRUCTIONS:    Patient Instructions   1  Medication changes today:   No medication changes today        2  Please take 1 week a blood pressure readings  at this time     AS FOLLOWS  MORNING AND EVENING, SITTING as follows:  · TAKE THE MORNING READINGS BEFORE ANY MEDICATIONS AND WHEN YOU ARE RELAXED FOR SEVERAL MINUTES  · TAKE THE EVENING READINGS:  BETWEEN 7-10 P M ; PRIOR TO ANY MEDICATIONS; AT LEAST IN OUR  FROM DINNER; AND CERTAINLY AFTER RELAXING FOR A FEW MINUTES  · PLEASE INCLUDE HEART RATE WITH YOUR BLOOD PRESSURE READINGS  · When taking standing readings, keep your arm supported at heart level and not dangling  · Make sure you are sitting with your back supported and feet on the ground and do not cross your legs or feet  · Make sure you have not taken any coffee or caffeine products or exercised or smoke cigarettes at least 30 minutes before taking your blood pressure  Then please mail these readings into the office    3  Please go for nonfasting lab work in 3 months      4  Follow-up in 6  months   Please bring in 1 week a blood pressure readings morning evening, sitting and standing is outlined above   PLEASE BRING AN YOUR BLOOD PRESSURE MACHINE TO CORRELATE WITH THE OFFICE MACHINE AT THIS NEXT SCHEDULED VISIT   Please go for fasting lab work 1-2 weeks prior to your appointment      6  General instructions:   AVOID SALT BUT NOT ADDING AN READING LABELS TO MAKE SURE THERE IS LOW-SALT IN THE FOOD THAT YOU ARE EATING  o Goal is less than 2 g of sodium intake or less than 5 g of sodium chloride intake per day     Avoid nonsteroidal anti-inflammatory drugs such as Naprosyn, ibuprofen, Aleve, Advil, Celebrex, Meloxicam (Mobic) etc   You can use Tylenol as needed if you do not have any liver condition to be concerned about     Avoid medications such as Sudafed or decongestants and antihistamines that contained the D component which is the decongestant  You can take antihistamines without the decongestant or D component   Try to avoid medications such as pantoprazole or  Protonix/Nexium or Esomeprazole)/Prilosec or omeprazole/Prevacid or lansoprazole/AcipHex or Rabeprazole    If you are able to, use Pepcid as this is safer for your kidneys   Try to exercise at least 30 minutes 3 days a week to begin with with an ultimate goal of 5 days a week for at least 30 minutes     Try to lose 5-10 lb by your next visit     Please do not drink more than 2 glasses of alcohol/wine on a daily basis as this may contribute to your high blood pressure  Potassium Content of Foods List   WHAT YOU NEED TO KNOW:   Potassium is a mineral that is found in most foods  Potassium helps to balance fluids and minerals in your body  It also helps your body maintain a normal blood pressure  Potassium helps your muscles contract and your nerves function normally  DISCHARGE INSTRUCTIONS:   Why you may need to change the amount of potassium you eat:   · You may need more potassium  if you have hypokalemia (low potassium levels) or high blood pressure  You may also need more potassium if you are taking diuretics  Diuretics and certain medicines cause your body to lose potassium  · You may need less potassium  in your diet if you have hyperkalemia (high potassium levels) or kidney disease  Potassium content of fruit:  The amount of potassium in milligrams (mg) contained in each fruit or serving of fruit is listed beside the item  1  High-potassium foods (more than 200 mg per serving):      ? 1 medium banana (425)    ? ½ of a papaya (390)    ? ½ cup of prune juice (370)    ? ¼ cup of raisins (270)    ? 1 medium zara (325) or kiwi (240)    ? 1 small orange (240) or ½ cup of orange juice (235)    ? ½ cup of cubed cantaloupe (215) or diced honeydew melon (200)    ? 1 medium pear (200)    2  Medium-potassium foods (50 to 200 mg per serving):      ? 1 medium peach (185)    ? 1 small apple or ½ cup of apple juice (150)    ? ½ cup of peaches canned in juice (120)    ? ½ cup of canned pineapple (100)    ? ½ cup of fresh, sliced strawberries (361)    ? ½ cup of watermelon (85)    3   Low-potassium foods (less than 50 mg per serving):      ? ½ cup of cranberries (45) or cranberry juice cocktail (20)    ? ½ cup of nectar of papaya, zara, or pear (35)    Potassium content of vegetables:   1  High-potassium foods (more than 200 mg per serving):      ? 1 medium baked potato, with skin (925)    ? 1 baked medium sweet potato, with skin (450)    ? ½ cup of tomato or vegetable juice (275), or 1 medium raw tomato (290)    ? ½ cup of mushrooms (280)    ? ½ cup of fresh brussels sprouts (250)    ? ½ cup of cooked zucchini (220) or winter squash (250)    ? ¼ of a medium avocado (245)    ? ½ cup of broccoli (230)    2  Medium-potassium foods (50 to 200 mg per serving):      ? ½ cup of corn (195)    ? ½ cup of fresh or cooked carrots (180)    ? ½ cup of fresh cauliflower (150)    ? ½ cup of asparagus (155)    ? ½ cup of canned peas (90)     ? 1 cup of lettuce, all types (100)    ? ½ cup of fresh green beans (90)    ? ½ cup of frozen green beans (85)    ? ½ cup of cucumber (80)    Potassium content of protein foods:   1  High-potassium foods (more than 200 mg per serving):      ? ½ cup of cooked perez beans (400) or lentils (365)    ? 1 cup of soy milk (300)    ? 3 ounces of baked or broiled salmon (319)    ? 3 ounces of roasted turkey, dark meat (250)    ? ¼ cup of sunflower seeds (241)    ? 3 ounces of cooked lean beef (224)    ? 2 tablespoons of smooth peanut butter (210)    2  Medium-potassium foods (50 to 200 mg per serving):      ? 1 ounce of salted peanuts, almonds, or cashews (200)    ? 1 large egg (60 mg)    Potassium content of dairy foods:   1  High-potassium foods (more than 200 mg per serving):      ? 6 ounces of yogurt (260 to 435)    ? 1 cup of nonfat, low-fat, or whole milk (350 to 380)    2  Medium-potassium foods (50 to 200 mg per serving):      ? ½ cup of ricotta cheese (154)    ? ½ cup of vanilla ice cream (131)    ? ½ cup of low-fat (2%) cottage cheese (110)    3   Low-potassium foods (less than 50 mg per serving):      ? 1 ounce of cheese (20 to 30)      Potassium content of grains:   · 1 slice of white bread (30)    · ½ cup of white or brown rice (50)    · ½ cup of spaghetti or macaroni (30)    · 1 flour or corn tortilla (50)    · 1 four-inch waffle (50)    Potassium content of other foods:   · 1 tablespoon of molasses (295)    · 1½ ounces of chocolate (165)    · Some salt substitutes may contain a high amount of potassium  Check the food label to find the amount of potassium it contains  © Copyright [employer] 2022 Information is for End User's use only and may not be sold, redistributed or otherwise used for commercial purposes  All illustrations and images included in CareNotes® are the copyrighted property of [employer]  or Jacob Toelntino   The above information is an  only  It is not intended as medical advice for individual conditions or treatments  Talk to your doctor, nurse or pharmacist before following any medical regimen to see if it is safe and effective for you  Subjective: There has been no hospitalizations or acute illnesses since last visit  The patient overall is feeling well  No fevers, chills, or cough or colds    Good appetite and good energy  No hematuria, dysuria, voiding symptoms or foamy urine  No gastrointestinal symptoms  No chest pain, chronic dyspnea on exertion; on oxygen chronically, in 1 leg swelling except for left lower extremity which is chronic  No headaches, occasional dizziness or lightheadedness, but improved thought related to rapid increase in getting up from a sitting position  Blood pressure medications:   Metoprolol tartrate 50 mg twice a day   Amlodipine 5 mg daily      ROS:  See HPI, otherwise review of systems as completely reviewed with the patient are negative    Past Medical History:   Diagnosis Date    Anemia     Arthritis     [facility] [address] [facility]     Chronic kidney disease     Diabetes mellitus ([facility] [address] [facility]     Disease of thyroid gland     HLD (hyperlipidemia)     Hypertension     [facility]varian cancer [facility] [address] [facility]     Pneumonia      Past Surgical History:   Procedure Laterality Date    CHOLECYSTECTOMY      CT GUIDED PERC DRAINAGE CATHETER PLACEMENT  5/16/2016    GALLBLADDER SURGERY  05/01/2004    HYSTERECTOMY  06/01/2018    NEPHRECTOMY Right 08/02/2018     Family History   Problem Relation Age of Onset    No Known Problems Mother     No Known Problems Father       reports that she quit smoking about 11 years ago  She has a 90 00 pack-year smoking history  She quit smokeless tobacco use about 10 years ago  She reports current drug use  Drug: Marijuana  She reports that she does not drink alcohol  I COMPLETELY REVIEWED THE PAST MEDICAL HISTORY/PAST SURGICAL HISTORY/SOCIAL HISTORY/FAMILY HISTORY/AND MEDICATIONS  AND UPDATED ALL    Objective:     Vitals:   BP sitting on left forearm:  116/76 with a heart rate of 84 and irregular    Weight (last 2 days)     Date/Time Weight    08/11/22 1341 145 (319)     Weight: per patient at 08/11/22 1341        Wt Readings from Last 3 Encounters:   08/11/22 (!) 145 kg (319 lb)   07/27/22 (!) 145 kg (319 lb 12 8 oz)   04/25/22 (!) 140 kg (309 lb)       Body mass index is 54 76 kg/m²      Physical Exam: General:  No acute distress/morbidly obese, in chair  Skin:  Psoriatic rash intermittently  Eyes:  No scleral icterus, noninjected, no discharge from eyes  ENT:  Moist mucous membranes  Neck:  Supple, no jugular venous distention, trachea is midline, no lymphadenopathy and no thyromegaly  Back   No CVAT  Chest:  Clear to auscultation and percussion, good respiratory effort  CVS:  Irregularly irregular rhythm without a rub, or gallops or murmurs  Abdomen:  Obese, Soft and nontender with normal bowel sounds  Extremities:  No cyanosis and trace left lower extremity edema with significant venous stasis changes, no arthritic changes, normal range of motion  Neuro:  Grossly intact  Psych:  Alert, oriented x3 and appropriate      Medications:    Current Outpatient Medications:     albuterol (2 5 mg/3 mL) 0 083 % nebulizer solution, INHALE CONTENTS OF 1 VIAL ( 3 MILLILITERS ) IN NEBULIZER BY MOUTH AND INTO THE LUNGS EVERY 6 HOURS IF NEEDED FOR WHEEZING OR SHORTNESS OF BREATH, Disp: 75 mL, Rfl: 5    albuterol (PROVENTIL HFA,VENTOLIN HFA) 90 mcg/act inhaler, Inhale 2 puffs every 4 (four) hours as needed for wheezing, Disp: 8 5 g, Rfl: 2    amLODIPine (NORVASC) 5 mg tablet, Take 1 tablet (5 mg total) by mouth daily, Disp: 90 tablet, Rfl: 2    atorvastatin (LIPITOR) 10 mg tablet, Take 1 tablet (10 mg total) by mouth daily, Disp: 90 tablet, Rfl: 1    Blood Glucose Monitoring Suppl (PipelineDBace Pro Glucose Meter) SAVANNAH, Use 2 (two) times a day Embrace glucometer, test twice daily, Disp: 1 each, Rfl: 0    cholecalciferol (VITAMIN D3) 400 units tablet, Take 1 tablet (400 Units total) by mouth daily, Disp: 90 tablet, Rfl: 1    DULoxetine (CYMBALTA) 30 mg delayed release capsule, Take 1 capsule (30 mg total) by mouth 2 (two) times a day, Disp: 180 capsule, Rfl: 1    Embrace Lancets Ultra Thin 30G MISC, Use 2 (two) times a day, Disp: 100 each, Rfl: 5    famotidine (PEPCID) 20 mg tablet, Take 1 tablet (20 mg total) by mouth daily, Disp: 90 tablet, Rfl: 1    ferrous sulfate 325 (65 Fe) mg tablet, Take 325 mg by mouth 2 (two) times a week, Disp: , Rfl:     glucose blood (PipelineDBace Pro Glucose Test) test strip, PLEASE RE-WRITE RX FOR EMBRACE PRO **EMBRACE REGUALR IS NOT AVAILABLE**  ---TEST TWICE DAILY--, Disp: 100 strip, Rfl: 5    glucose blood test strip, Use 1 each daily Embrace test strips, test daily, Disp: 50 strip, Rfl: 5    Insulin Pen Needle (Pen Needles) 32G X 4 MM MISC, Use daily Embrace pen needles, test daily, Disp: 50 each, Rfl: 5    ketoconazole (NIZORAL) 2 % cream, Apply topically 2 (two) times a day, Disp: 30 g, Rfl: 2    levothyroxine 100 mcg tablet, take 1 tablet by mouth in THE EARLY MORNING, Disp: 90 tablet, Rfl: 3    LORazepam (ATIVAN) 0 5 mg tablet, Take 1 tablet (0 5 mg total) by mouth daily at bedtime, Disp: 30 tablet, Rfl: 5    metoprolol tartrate (LOPRESSOR) 50 mg tablet, Take 1 tablet (50 mg total) by mouth 2 (two) times a day, Disp: 180 tablet, Rfl: 1    triamcinolone (KENALOG) 0 1 % ointment, Apply topically 2 (two) times a day, Disp: 30 g, Rfl: 0    vitamin B-12 (VITAMIN B-12) 500 mcg tablet, Take 1 tablet (500 mcg total) by mouth daily, Disp: 90 tablet, Rfl: 3    warfarin (Coumadin) 3 mg tablet, Take 1 tablet (3 mg total) by mouth daily, Disp: 90 tablet, Rfl: 1    pantoprazole (PROTONIX) 40 mg tablet, Take 1 tablet (40 mg total) by mouth daily (Patient not taking: No sig reported), Disp: 90 tablet, Rfl: 1    Lab, Imaging and other studies: I have personally reviewed pertinent labs  Laboratory Results:  Results for orders placed or performed in visit on 08/05/22   Protime-INR   Result Value Ref Range    INR 3 40 (A) 0 84 - 1 19             Invalid input(s): ALBUMIN      Radiology review:   chest X-ray    Ultrasound      Portions of the record may have been created with voice recognition software  Occasional wrong word or "sound a like" substitutions may have occurred due to the inherent limitations of voice recognition software  Read the chart carefully and recognize, using context, where substitutions have occurred

## 2022-08-11 NOTE — PATIENT INSTRUCTIONS
1  Medication changes today:  No medication changes today  2  Please take 1 week a blood pressure readings  at this time     AS FOLLOWS  MORNING AND EVENING, SITTING as follows:  TAKE THE MORNING READINGS BEFORE ANY MEDICATIONS AND WHEN YOU ARE RELAXED FOR SEVERAL MINUTES  TAKE THE EVENING READINGS:  BETWEEN 7-10 P M ; PRIOR TO ANY MEDICATIONS; AT LEAST IN OUR  FROM DINNER; AND CERTAINLY AFTER RELAXING FOR A FEW MINUTES  PLEASE INCLUDE HEART RATE WITH YOUR BLOOD PRESSURE READINGS  When taking standing readings, keep your arm supported at heart level and not dangling  Make sure you are sitting with your back supported and feet on the ground and do not cross your legs or feet  Make sure you have not taken any coffee or caffeine products or exercised or smoke cigarettes at least 30 minutes before taking your blood pressure  Then please mail these readings into the office    3  Please go for nonfasting lab work in 3 months      4  Follow-up in 6  months  Please bring in 1 week a blood pressure readings morning evening, sitting and standing is outlined above  PLEASE BRING AN YOUR BLOOD PRESSURE MACHINE TO CORRELATE WITH THE OFFICE MACHINE AT THIS NEXT SCHEDULED VISIT  Please go for fasting lab work 1-2 weeks prior to your appointment      6  General instructions:  AVOID SALT BUT NOT ADDING AN READING LABELS TO MAKE SURE THERE IS LOW-SALT IN THE FOOD THAT YOU ARE EATING  Goal is less than 2 g of sodium intake or less than 5 g of sodium chloride intake per day    Avoid nonsteroidal anti-inflammatory drugs such as Naprosyn, ibuprofen, Aleve, Advil, Celebrex, Meloxicam (Mobic) etc   You can use Tylenol as needed if you do not have any liver condition to be concerned about    Avoid medications such as Sudafed or decongestants and antihistamines that contained the D component which is the decongestant  You can take antihistamines without the decongestant or D component      Try to avoid medications such as pantoprazole or  Protonix/Nexium or Esomeprazole)/Prilosec or omeprazole/Prevacid or lansoprazole/AcipHex or Rabeprazole  If you are able to, use Pepcid as this is safer for your kidneys  Try to exercise at least 30 minutes 3 days a week to begin with with an ultimate goal of 5 days a week for at least 30 minutes    Try to lose 5-10 lb by your next visit    Please do not drink more than 2 glasses of alcohol/wine on a daily basis as this may contribute to your high blood pressure  Potassium Content of Foods List   WHAT YOU NEED TO KNOW:   Potassium is a mineral that is found in most foods  Potassium helps to balance fluids and minerals in your body  It also helps your body maintain a normal blood pressure  Potassium helps your muscles contract and your nerves function normally  DISCHARGE INSTRUCTIONS:   Why you may need to change the amount of potassium you eat: You may need more potassium  if you have hypokalemia (low potassium levels) or high blood pressure  You may also need more potassium if you are taking diuretics  Diuretics and certain medicines cause your body to lose potassium  You may need less potassium  in your diet if you have hyperkalemia (high potassium levels) or kidney disease  Potassium content of fruit:  The amount of potassium in milligrams (mg) contained in each fruit or serving of fruit is listed beside the item    High-potassium foods (more than 200 mg per serving):      1 medium banana (425)    ½ of a papaya (390)    ½ cup of prune juice (370)    ¼ cup of raisins (270)    1 medium zara (325) or kiwi (240)    1 small orange (240) or ½ cup of orange juice (235)    ½ cup of cubed cantaloupe (215) or diced honeydew melon (200)    1 medium pear (200)    Medium-potassium foods (50 to 200 mg per serving):      1 medium peach (185)    1 small apple or ½ cup of apple juice (150)    ½ cup of peaches canned in juice (120)    ½ cup of canned pineapple (100)    ½ cup of fresh, sliced strawberries (125)    ½ cup of watermelon (85)    Low-potassium foods (less than 50 mg per serving):      ½ cup of cranberries (45) or cranberry juice cocktail (20)    ½ cup of nectar of papaya, zara, or pear (35)    Potassium content of vegetables:   High-potassium foods (more than 200 mg per serving):      1 medium baked potato, with skin (925)    1 baked medium sweet potato, with skin (450)    ½ cup of tomato or vegetable juice (275), or 1 medium raw tomato (290)    ½ cup of mushrooms (280)    ½ cup of fresh brussels sprouts (250)    ½ cup of cooked zucchini (220) or winter squash (250)    ¼ of a medium avocado (245)    ½ cup of broccoli (230)    Medium-potassium foods (50 to 200 mg per serving):      ½ cup of corn (195)    ½ cup of fresh or cooked carrots (180)    ½ cup of fresh cauliflower (150)    ½ cup of asparagus (155)    ½ cup of canned peas (90)     1 cup of lettuce, all types (100)    ½ cup of fresh green beans (90)    ½ cup of frozen green beans (85)    ½ cup of cucumber (80)    Potassium content of protein foods:   High-potassium foods (more than 200 mg per serving):      ½ cup of cooked perez beans (400) or lentils (365)    1 cup of soy milk (300)    3 ounces of baked or broiled salmon (319)    3 ounces of roasted turkey, dark meat (250)    ¼ cup of sunflower seeds (241)    3 ounces of cooked lean beef (224)    2 tablespoons of smooth peanut butter (210)    Medium-potassium foods (50 to 200 mg per serving):      1 ounce of salted peanuts, almonds, or cashews (200)    1 large egg (60 mg)    Potassium content of dairy foods:   High-potassium foods (more than 200 mg per serving):      6 ounces of yogurt (260 to 435)    1 cup of nonfat, low-fat, or whole milk (350 to 380)    Medium-potassium foods (50 to 200 mg per serving):      ½ cup of ricotta cheese (154)    ½ cup of vanilla ice cream (131)    ½ cup of low-fat (2%) cottage cheese (110)    Low-potassium foods (less than 50 mg per serving):      1 ounce of cheese (20 to 30)      Potassium content of grains:   1 slice of white bread (30)    ½ cup of white or brown rice (50)    ½ cup of spaghetti or macaroni (30)    1 flour or corn tortilla (50)    1 four-inch waffle (50)    Potassium content of other foods:   1 tablespoon of molasses (295)    1½ ounces of chocolate (165)    Some salt substitutes may contain a high amount of potassium  Check the food label to find the amount of potassium it contains  © Copyright [employer] 2022 Information is for End User's use only and may not be sold, redistributed or otherwise used for commercial purposes  All illustrations and images included in CareNotes® are the copyrighted property of [employer]  or Jacob U[employer]  The above information is an  only  It is not intended as medical advice for individual conditions or treatments  Talk to your doctor, nurse or pharmacist before following any medical regimen to see if it is safe and effective for you

## 2022-08-13 LAB
CALCIUM SERPL-MCNC: 8.7 MG/DL (ref 8.6–10.4)
PTH-INTACT SERPL-MCNC: 79 PG/ML (ref 16–77)

## 2022-08-16 LAB — INR PPP: 2 (ref 0.84–1.19)

## 2022-08-17 LAB
INR PPP: 2
PROTHROMBIN TIME: 19.5 SEC (ref 9–11.5)

## 2022-08-18 ENCOUNTER — ANTICOAG VISIT (OUTPATIENT)
Dept: FAMILY MEDICINE CLINIC | Facility: CLINIC | Age: 62
End: 2022-08-18

## 2022-08-18 ENCOUNTER — TELEPHONE (OUTPATIENT)
Dept: FAMILY MEDICINE CLINIC | Facility: CLINIC | Age: 62
End: 2022-08-18

## 2022-08-18 NOTE — TELEPHONE ENCOUNTER
Called and spoke to Constanza(significant other) gave coumadin instruction and recheck INR in 2 weeks  Fab Harrington

## 2022-08-18 NOTE — TELEPHONE ENCOUNTER
----- Message from Juliet Severance, MD sent at 8/17/2022  5:49 PM EDT -----  Please call the patient regarding her abnormal result  Continue same Coumadin dose    Recheck INR in 2 weeks

## 2022-08-18 NOTE — PROGRESS NOTES
Called and spoke to Constanza(significant other) gave coumadin instruction and recheck INR in 2 weeks    Sergo Alvarado

## 2022-08-30 LAB — INR PPP: 1.7 (ref 0.84–1.19)

## 2022-08-31 LAB
QUESTION/PROBLEM: NORMAL
SL AMB CONTAINER TYPE: NORMAL

## 2022-09-01 ENCOUNTER — TELEPHONE (OUTPATIENT)
Dept: FAMILY MEDICINE CLINIC | Facility: CLINIC | Age: 62
End: 2022-09-01

## 2022-09-01 LAB
INR PPP: 1.7
PROTHROMBIN TIME: 16.7 SEC (ref 9–11.5)

## 2022-09-01 NOTE — TELEPHONE ENCOUNTER
Called and spoke to Gaby at [place] in regards to message, faxed paper over to Megan Johnson at [phone] and she will sign and fax   Was her error on blood draw, no label placed for INR  Genevie Smart

## 2022-09-02 ENCOUNTER — ANTICOAG VISIT (OUTPATIENT)
Dept: FAMILY MEDICINE CLINIC | Facility: CLINIC | Age: 62
End: 2022-09-02

## 2022-09-02 ENCOUNTER — TELEPHONE (OUTPATIENT)
Dept: FAMILY MEDICINE CLINIC | Facility: CLINIC | Age: 62
End: 2022-09-02

## 2022-09-02 NOTE — PROGRESS NOTES
CALLED AND SPOKE TO PATIENT GAVE INR RESULTS AND RECHECK INR IN 1 WEEK   (TAKE 6 MG 9-2-22 AND THEN 3 MG DAILY, RECHECK IN [address]

## 2022-09-02 NOTE — TELEPHONE ENCOUNTER
----- Message from Luiz Walker MD sent at 9/1/2022  4:47 PM EDT -----  Please call the patient regarding her abnormal result  INR 1 7  Take Coumadin 6 mg today then 3 mg every day    Recheck in 1 week

## 2022-09-02 NOTE — TELEPHONE ENCOUNTER
CALLED AND SPOKE TO PATIENT GAVE COUMADIN RESULTS AND RECHECK INR IN [address] [facility]

## 2022-09-08 LAB — INR PPP: 1.8 (ref 0.84–1.19)

## 2022-09-09 ENCOUNTER — TELEPHONE (OUTPATIENT)
Dept: FAMILY MEDICINE CLINIC | Facility: CLINIC | Age: 62
End: 2022-09-09

## 2022-09-09 ENCOUNTER — ANTICOAG VISIT (OUTPATIENT)
Dept: FAMILY MEDICINE CLINIC | Facility: CLINIC | Age: 62
End: 2022-09-09

## 2022-09-09 LAB
INR PPP: 1.8
PROTHROMBIN TIME: 17.9 SEC (ref 9–11.5)

## 2022-09-09 NOTE — TELEPHONE ENCOUNTER
----- Message from Aubrie Carcamo MD sent at 9/9/2022 12:13 PM EDT -----  Please call the patient regarding her abnormal result  Inr 1 8  Take Coumadin 6 mg every Fridays and Saturdays and rest 3 mg  Recheck in 1 week

## 2022-09-09 NOTE — PROGRESS NOTES
Called and spoke to patient gave coumadin results, take 6 mg on Fridays and Saturdays and 3 mg other days   INR recheck in 1 week  Heather Nichols

## 2022-09-09 NOTE — TELEPHONE ENCOUNTER
Called and spoke to patient gave coumadin instruction to take 6 mg on Fridays and Saturdays and 3 mg on other days, recheck INR in 1 week  Radha Grover

## 2022-09-15 LAB
INR PPP: 3
INR PPP: 3 (ref 0.84–1.19)
PROTHROMBIN TIME: 28.2 SEC (ref 9–11.5)

## 2022-09-19 ENCOUNTER — TELEPHONE (OUTPATIENT)
Dept: FAMILY MEDICINE CLINIC | Facility: CLINIC | Age: 62
End: 2022-09-19

## 2022-09-19 ENCOUNTER — ANTICOAG VISIT (OUTPATIENT)
Dept: FAMILY MEDICINE CLINIC | Facility: CLINIC | Age: 62
End: 2022-09-19

## 2022-09-19 NOTE — TELEPHONE ENCOUNTER
----- Message from Oswaldo Mirza MD sent at 9/19/2022 10:27 AM EDT -----  Please call the patient regarding her abnormal result  INR was 3 0  Skip Coumadin for today  Then resume same dose    Recheck in 2 weeks

## 2022-09-19 NOTE — TELEPHONE ENCOUNTER
Called and spoke to Constanza(significant other) gave coumadin instruction, skip coumadin 9- and continue same dose 9-, recheck INR in 2 weeks  Gay Bello

## 2022-09-27 LAB
INR PPP: 3.3
INR PPP: 3.3 (ref 0.84–1.19)
PROTHROMBIN TIME: 30.5 SEC (ref 9–11.5)

## 2022-09-28 ENCOUNTER — TELEPHONE (OUTPATIENT)
Dept: FAMILY MEDICINE CLINIC | Facility: CLINIC | Age: 62
End: 2022-09-28

## 2022-09-28 NOTE — TELEPHONE ENCOUNTER
Called and spoke to patient already took coumadin will skip 9-29 and then take 3 mg daily and recheck in 1 week  Gato Cheema

## 2022-09-28 NOTE — TELEPHONE ENCOUNTER
----- Message from Carol Cabrera MD sent at 9/28/2022  4:59 PM EDT -----  Please call the patient regarding her abnormal result  Do not take any Coumadin today  Then 3 mg every day    Recheck in 1 week

## 2022-09-29 ENCOUNTER — ANTICOAG VISIT (OUTPATIENT)
Dept: FAMILY MEDICINE CLINIC | Facility: CLINIC | Age: 62
End: 2022-09-29

## 2022-09-29 NOTE — PROGRESS NOTES
Called and spoke to patient advised to hold coumadin 9-29-22(patient already took dose before I called) then 3 mg daily recheck in 1 week  Kelsey Arguelles

## 2022-10-07 ENCOUNTER — ANTICOAG VISIT (OUTPATIENT)
Dept: FAMILY MEDICINE CLINIC | Facility: CLINIC | Age: 62
End: 2022-10-07

## 2022-10-07 LAB
INR PPP: 2
PROTHROMBIN TIME: 19 SEC (ref 9–11.5)

## 2022-10-11 PROBLEM — Z00.00 MEDICARE ANNUAL WELLNESS VISIT, SUBSEQUENT: Status: RESOLVED | Noted: 2022-04-25 | Resolved: 2022-10-11

## 2022-10-15 DIAGNOSIS — I48.11 LONGSTANDING PERSISTENT ATRIAL FIBRILLATION (HCC): ICD-10-CM

## 2022-10-17 RX ORDER — WARFARIN SODIUM 3 MG/1
TABLET ORAL
Qty: 90 TABLET | Refills: 1 | Status: SHIPPED | OUTPATIENT
Start: 2022-10-17 | End: 2022-10-26 | Stop reason: SDUPTHER

## 2022-10-26 ENCOUNTER — OFFICE VISIT (OUTPATIENT)
Dept: FAMILY MEDICINE CLINIC | Facility: CLINIC | Age: 62
End: 2022-10-26
Payer: MEDICARE

## 2022-10-26 VITALS
HEIGHT: 64 IN | DIASTOLIC BLOOD PRESSURE: 88 MMHG | BODY MASS INDEX: 54.76 KG/M2 | HEART RATE: 90 BPM | SYSTOLIC BLOOD PRESSURE: 110 MMHG | TEMPERATURE: 97 F | OXYGEN SATURATION: 90 %

## 2022-10-26 DIAGNOSIS — E11.21 DIABETIC NEPHROPATHY ASSOCIATED WITH TYPE 2 DIABETES MELLITUS (HCC): ICD-10-CM

## 2022-10-26 DIAGNOSIS — S81.802A LEG WOUND, LEFT, INITIAL ENCOUNTER: ICD-10-CM

## 2022-10-26 DIAGNOSIS — K21.9 GASTROESOPHAGEAL REFLUX DISEASE WITHOUT ESOPHAGITIS: ICD-10-CM

## 2022-10-26 DIAGNOSIS — I48.11 LONGSTANDING PERSISTENT ATRIAL FIBRILLATION (HCC): ICD-10-CM

## 2022-10-26 DIAGNOSIS — D63.1 ANEMIA DUE TO STAGE 3B CHRONIC KIDNEY DISEASE (HCC): ICD-10-CM

## 2022-10-26 DIAGNOSIS — N18.32 ANEMIA DUE TO STAGE 3B CHRONIC KIDNEY DISEASE (HCC): ICD-10-CM

## 2022-10-26 DIAGNOSIS — J96.11 CHRONIC RESPIRATORY FAILURE WITH HYPOXIA (HCC): ICD-10-CM

## 2022-10-26 DIAGNOSIS — J44.9 COPD WITH ASTHMA (HCC): ICD-10-CM

## 2022-10-26 DIAGNOSIS — N18.32 STAGE 3B CHRONIC KIDNEY DISEASE (HCC): ICD-10-CM

## 2022-10-26 DIAGNOSIS — E61.1 IRON DEFICIENCY: ICD-10-CM

## 2022-10-26 DIAGNOSIS — R80.1 PERSISTENT PROTEINURIA: ICD-10-CM

## 2022-10-26 DIAGNOSIS — E03.9 HYPOTHYROIDISM, UNSPECIFIED TYPE: Chronic | ICD-10-CM

## 2022-10-26 DIAGNOSIS — E66.01 MORBID (SEVERE) OBESITY DUE TO EXCESS CALORIES (HCC): ICD-10-CM

## 2022-10-26 DIAGNOSIS — N25.81 SECONDARY HYPERPARATHYROIDISM OF RENAL ORIGIN (HCC): ICD-10-CM

## 2022-10-26 DIAGNOSIS — Z23 ENCOUNTER FOR IMMUNIZATION: ICD-10-CM

## 2022-10-26 DIAGNOSIS — E03.8 OTHER SPECIFIED HYPOTHYROIDISM: ICD-10-CM

## 2022-10-26 DIAGNOSIS — E11.42 DIABETIC POLYNEUROPATHY ASSOCIATED WITH TYPE 2 DIABETES MELLITUS (HCC): ICD-10-CM

## 2022-10-26 DIAGNOSIS — I12.9 HYPERTENSIVE CHRONIC KIDNEY DISEASE WITH STAGE 1 THROUGH STAGE 4 CHRONIC KIDNEY DISEASE, OR UNSPECIFIED CHRONIC KIDNEY DISEASE: ICD-10-CM

## 2022-10-26 DIAGNOSIS — E66.9 DIABETES MELLITUS TYPE 2 IN OBESE (HCC): Primary | ICD-10-CM

## 2022-10-26 DIAGNOSIS — Z90.5 H/O RIGHT NEPHRECTOMY: ICD-10-CM

## 2022-10-26 DIAGNOSIS — E11.69 DIABETES MELLITUS TYPE 2 IN OBESE (HCC): Primary | ICD-10-CM

## 2022-10-26 DIAGNOSIS — E78.5 DYSLIPIDEMIA: ICD-10-CM

## 2022-10-26 DIAGNOSIS — E11.49 OTHER DIABETIC NEUROLOGICAL COMPLICATION ASSOCIATED WITH TYPE 2 DIABETES MELLITUS (HCC): ICD-10-CM

## 2022-10-26 DIAGNOSIS — I48.0 PAROXYSMAL ATRIAL FIBRILLATION (HCC): Chronic | ICD-10-CM

## 2022-10-26 PROBLEM — E11.40 DIABETIC NEUROPATHY (HCC): Status: ACTIVE | Noted: 2022-10-26

## 2022-10-26 PROCEDURE — 90682 RIV4 VACC RECOMBINANT DNA IM: CPT

## 2022-10-26 PROCEDURE — G0008 ADMIN INFLUENZA VIRUS VAC: HCPCS

## 2022-10-26 PROCEDURE — 99214 OFFICE O/P EST MOD 30 MIN: CPT

## 2022-10-26 RX ORDER — FAMOTIDINE 20 MG/1
20 TABLET, FILM COATED ORAL DAILY
Qty: 90 TABLET | Refills: 3 | Status: SHIPPED | OUTPATIENT
Start: 2022-10-26

## 2022-10-26 RX ORDER — AMLODIPINE BESYLATE 5 MG/1
5 TABLET ORAL DAILY
Qty: 90 TABLET | Refills: 2 | Status: SHIPPED | OUTPATIENT
Start: 2022-10-26 | End: 2023-01-24

## 2022-10-26 RX ORDER — ALBUTEROL SULFATE 90 UG/1
2 AEROSOL, METERED RESPIRATORY (INHALATION) EVERY 4 HOURS PRN
Qty: 8.5 G | Refills: 12 | Status: SHIPPED | OUTPATIENT
Start: 2022-10-26 | End: 2023-01-24

## 2022-10-26 RX ORDER — METOPROLOL TARTRATE 50 MG/1
50 TABLET, FILM COATED ORAL 2 TIMES DAILY
Qty: 180 TABLET | Refills: 3 | Status: SHIPPED | OUTPATIENT
Start: 2022-10-26

## 2022-10-26 RX ORDER — OMEGA-3S/DHA/EPA/FISH OIL/D3 300MG-1000
400 CAPSULE ORAL DAILY
Qty: 90 TABLET | Refills: 3 | Status: SHIPPED | OUTPATIENT
Start: 2022-10-26 | End: 2023-01-24

## 2022-10-26 RX ORDER — DULOXETIN HYDROCHLORIDE 30 MG/1
30 CAPSULE, DELAYED RELEASE ORAL 2 TIMES DAILY
Qty: 180 CAPSULE | Refills: 3 | Status: SHIPPED | OUTPATIENT
Start: 2022-10-26

## 2022-10-26 RX ORDER — PREGABALIN 50 MG/1
CAPSULE ORAL
Qty: 60 CAPSULE | Refills: 3 | Status: SHIPPED | OUTPATIENT
Start: 2022-10-26

## 2022-10-26 RX ORDER — ATORVASTATIN CALCIUM 10 MG/1
10 TABLET, FILM COATED ORAL DAILY
Qty: 90 TABLET | Refills: 3 | Status: SHIPPED | OUTPATIENT
Start: 2022-10-26

## 2022-10-26 RX ORDER — WARFARIN SODIUM 3 MG/1
3 TABLET ORAL DAILY
Qty: 90 TABLET | Refills: 3 | Status: SHIPPED | OUTPATIENT
Start: 2022-10-26

## 2022-10-26 NOTE — PROGRESS NOTES
Diabetic Foot Exam    Patient's shoes and socks removed  Right Foot/Ankle   Right Foot Inspection  Skin Exam: skin normal, skin intact, callus and callus  No dry skin, no warmth, no erythema, no maceration, no abnormal color, no pre-ulcer and no ulcer  Toe Exam: ROM and strength within normal limits  Sensory   Monofilament testing: intact    Vascular  Capillary refills: < 3 seconds  The right DP pulse is 1+ (Diminished)  Left Foot/Ankle  Left Foot Inspection  Skin Exam: skin normal, skin intact and callus  No dry skin, no warmth, no erythema, no maceration, normal color, no pre-ulcer and no ulcer  Toe Exam: ROM and strength within normal limits  Sensory   Monofilament testing: intact    Vascular  Capillary refills: < 3 seconds  The left DP pulse is 1+ (Diminished  )  Assign Risk Category  Deformity present  No loss of protective sensation  Weak pulses  Risk: 1  Name: Robyn Pearson      : 1960      MRN: [id number]  Encounter Provider: Natalie Darling MD  Encounter Date: 10/26/2022   Encounter department: [address] [place] [address]  Diabetes mellitus type 2 in [facility])  Assessment & Plan:    Lab Results   Component Value Date    HGBA1C 6 9 2022   Labs were ordered last visit but they are not done yet  I will reorder labs  No hypoglycemia  Blood sugar was around 140    Orders:  -     Lipid panel; Future  -     TSH, 3rd generation; Future  -     Comprehensive metabolic panel; Future  -     CBC and differential; Future  -     HEMOGLOBIN A1C W/ EAG ESTIMATION; Future    2  Diabetic nephropathy associated with type 2 diabetes mellitus ([facility] [address] )    3  Hypothyroidism, unspecified type    4  Chronic respiratory failure with hypoxia (HCC)  Assessment & Plan:  Oxygen level stable  Continue oxygen and nebulizer      5  Paroxysmal atrial fibrillation (HCC)  Assessment & Plan:  Ventricular rate under control  INR is therapeutic    Monitor for bleeding    Orders:  -     metoprolol tartrate (LOPRESSOR) 50 mg tablet; Take 1 tablet (50 mg total) by mouth 2 (two) times a day    6  Diabetic polyneuropathy associated with type 2 diabetes mellitus [facility])  Assessment & Plan:    Lab Results   Component Value Date    HGBA1C 6 9 03/09/2022   gabapentin 400 mg did not help  cymbalta not helping  Start lyrica  S/e d/w pt  pdmp ok    Orders:  -     pregabalin (LYRICA) 50 mg capsule; Take po 1 pill Qhs x 3 days then 1 po bid    7  COPD with asthma ([facility] [address] )  -     albuterol (PROVENTIL HFA,VENTOLIN HFA) 90 mcg/act inhaler; Inhale 2 puffs every 4 (four) hours as needed for wheezing    8  Hypertensive chronic kidney disease with stage 1 through stage 4 chronic kidney disease, or unspecified chronic kidney disease  -     amLODIPine (NORVASC) 5 mg tablet; Take 1 tablet (5 mg total) by mouth daily    9  Stage 3b chronic kidney disease (HCC)  -     amLODIPine (NORVASC) 5 mg tablet; Take 1 tablet (5 mg total) by mouth daily  -     cholecalciferol (VITAMIN D3) 400 units tablet; Take 1 tablet (400 Units total) by mouth daily    10  Secondary hyperparathyroidism of renal origin (HCC)  -     amLODIPine (NORVASC) 5 mg tablet; Take 1 tablet (5 mg total) by mouth daily    11  Anemia due to stage 3b chronic kidney disease (HCC)  -     amLODIPine (NORVASC) 5 mg tablet; Take 1 tablet (5 mg total) by mouth daily    12  Persistent proteinuria  -     amLODIPine (NORVASC) 5 mg tablet; Take 1 tablet (5 mg total) by mouth daily    13  Iron deficiency  -     amLODIPine (NORVASC) 5 mg tablet; Take 1 tablet (5 mg total) by mouth daily    14  Dyslipidemia  -     amLODIPine (NORVASC) 5 mg tablet; Take 1 tablet (5 mg total) by mouth daily  -     atorvastatin (LIPITOR) 10 mg tablet; Take 1 tablet (10 mg total) by mouth daily    15  H/O right nephrectomy  -     amLODIPine (NORVASC) 5 mg tablet; Take 1 tablet (5 mg total) by mouth daily    16   Morbid (severe) obesity due to excess calories ([facility] [address] )  - amLODIPine (NORVASC) 5 mg tablet; Take 1 tablet (5 mg total) by mouth daily    17  Other diabetic neurological complication associated with type 2 diabetes mellitus (HCC)  -     DULoxetine (CYMBALTA) 30 mg delayed release capsule; Take 1 capsule (30 mg total) by mouth 2 (two) times a day    18  Gastroesophageal reflux disease without esophagitis  -     famotidine (PEPCID) 20 mg tablet; Take 1 tablet (20 mg total) by mouth daily    19  Other specified hypothyroidism    20  Longstanding persistent atrial fibrillation (HCC)  -     warfarin (COUMADIN) 3 mg tablet; Take 1 tablet (3 mg total) by mouth daily    21  Leg wound, left, initial encounter  Assessment & Plan:  Continue dressing   Add santyl  Pt refused to go to wound care center or see any specialist     Orders:  -     collagenase (SANTYL) ointment; Apply topically daily        Depression Screening and Follow-up Plan: Patient advised to follow-up with PCP for further management  Subjective      1  Dm-2- fasting blood sugar was around 140  No hypoglycemia  No polyuria polydipsia  She does have chronic numbness in both feet from knee down to her toes  Gabapentin did not help  Cymbalta is not helping  No open ulcer in the feet  No chest pain  No change in the vision  No increased fatigue  2  resp failure/copd- oxygen level stable  No increased cough  No change in color of the mucus  No fever or chills  No hemoptysis  3  htn- no headache dizziness or lightheadedness  No increased edema  No orthopnea  Palpitation  4  Neuropathy- she has numbness in both feet and both legs for long time  Gabapentin 400 mg dose did not help  Cymbalta not helping either  It is making her uncomfortable  No claudication pain  5  Left leg ulcer- developed approximately 1 week ago after superficial part of the skin came of  No discharge  No fever or chills    Review of Systems   Constitutional: Negative for appetite change, chills, diaphoresis, fatigue and fever  HENT: Negative for congestion, drooling and sinus pain  Eyes: Negative for discharge and itching  Respiratory: Positive for shortness of breath and wheezing  Negative for cough and chest tightness  Cardiovascular: Negative for chest pain, palpitations and leg swelling  Gastrointestinal: Negative  Endocrine: Negative for polyphagia and polyuria  Genitourinary: Negative for difficulty urinating, dysuria, frequency and urgency  Musculoskeletal: Positive for gait problem  Skin: Negative for pallor  Allergic/Immunologic: Negative for food allergies  Neurological: Negative for dizziness, seizures, speech difficulty, light-headedness, numbness and headaches  Hematological: Negative for adenopathy  Does not bruise/bleed easily  Psychiatric/Behavioral: Negative for agitation, confusion, decreased concentration, sleep disturbance and suicidal ideas         Current Outpatient Medications on File Prior to Visit   Medication Sig   • albuterol (2 5 mg/3 mL) 0 083 % nebulizer solution INHALE CONTENTS OF 1 VIAL ( 3 MILLILITERS ) IN NEBULIZER BY MOUTH AND INTO THE LUNGS EVERY 6 HOURS IF NEEDED FOR WHEEZING OR SHORTNESS OF BREATH   • Blood Glucose Monitoring Suppl ([employer] Pro Glucose Meter) SAVANNAH Use 2 (two) times a day CircuLite glucometer, test twice daily   • Embrace Lancets Ultra Thin 30G MISC Use 2 (two) times a day   • ferrous sulfate 325 (65 Fe) mg tablet Take 325 mg by mouth 2 (two) times a week   • glucose blood ([employer] Pro Glucose Test) test strip PLEASE RE-WRITE RX FOR CARDFREEACE PRO **CARDFREEACE REGUALR IS NOT AVAILABLE**  ---TEST TWICE DAILY--   • glucose blood test strip Use 1 each daily [employer] test strips, test daily   • Insulin Pen Needle (Pen Needles) 32G X 4 MM MISC Use daily Embrace pen needles, test daily   • levothyroxine 100 mcg tablet take 1 tablet by mouth in THE EARLY MORNING   • LORazepam (ATIVAN) 0 5 mg tablet Take 1 tablet (0 5 mg total) by mouth daily at bedtime   • vitamin B-12 (VITAMIN B-12) 500 mcg tablet Take 1 tablet (500 mcg total) by mouth daily   • [DISCONTINUED] albuterol (PROVENTIL HFA,VENTOLIN HFA) 90 mcg/act inhaler Inhale 2 puffs every 4 (four) hours as needed for wheezing   • [DISCONTINUED] amLODIPine (NORVASC) 5 mg tablet Take 1 tablet (5 mg total) by mouth daily   • [DISCONTINUED] atorvastatin (LIPITOR) 10 mg tablet Take 1 tablet (10 mg total) by mouth daily   • [DISCONTINUED] cholecalciferol (VITAMIN D3) 400 units tablet Take 1 tablet (400 Units total) by mouth daily   • [DISCONTINUED] DULoxetine (CYMBALTA) 30 mg delayed release capsule Take 1 capsule (30 mg total) by mouth 2 (two) times a day   • [DISCONTINUED] famotidine (PEPCID) 20 mg tablet Take 1 tablet (20 mg total) by mouth daily   • [DISCONTINUED] metoprolol tartrate (LOPRESSOR) 50 mg tablet Take 1 tablet (50 mg total) by mouth 2 (two) times a day   • [DISCONTINUED] warfarin (COUMADIN) 3 mg tablet take 1 tablet by mouth once daily   • ketoconazole (NIZORAL) 2 % cream Apply topically 2 (two) times a day (Patient not taking: Reported on 10/26/2022)   • pantoprazole (PROTONIX) 40 mg tablet Take 1 tablet (40 mg total) by mouth daily (Patient not taking: No sig reported)   • triamcinolone (KENALOG) 0 1 % ointment Apply topically 2 (two) times a day (Patient not taking: Reported on 10/26/2022)       Objective     /88 (BP Location: Left arm, Patient Position: Sitting, Cuff Size: Standard)   Pulse 90   Temp (!) 97 °F (36 1 °C) (Temporal)   Ht 5' 4" (1 626 m)   SpO2 90%   BMI 54 76 kg/m²     Physical Exam  Vitals and nursing note reviewed  Constitutional:       Appearance: Normal appearance  She is well-developed  She is obese  She is not ill-appearing or diaphoretic  HENT:      Head: Atraumatic  Eyes:      General:         Right eye: No discharge  Left eye: No discharge  Neck:      Thyroid: No thyromegaly  Cardiovascular:      Rate and Rhythm: Normal rate and regular rhythm        Pulses: Pulses are weak  Dorsalis pedis pulses are 1+ (Diminished) on the right side and 1+ (Diminished ) on the left side  Heart sounds: Normal heart sounds  Pulmonary:      Effort: Pulmonary effort is normal       Breath sounds: No wheezing  Chest:      Chest wall: No tenderness  Abdominal:      General: Bowel sounds are normal       Palpations: Abdomen is soft  Tenderness: There is no abdominal tenderness  Musculoskeletal:         General: No tenderness  Cervical back: Neck supple  Right lower leg: No edema  Left lower leg: No edema  Feet:      Right foot:      Skin integrity: Callus present  No ulcer, skin breakdown, erythema, warmth or dry skin  Left foot:      Skin integrity: Callus present  No ulcer, skin breakdown, erythema, warmth or dry skin  Lymphadenopathy:      Cervical: No cervical adenopathy  Skin:     Capillary Refill: Capillary refill takes less than 2 seconds  Findings: No erythema  Comments: Approximately 3 cm x 3 cm size superficial noninfected tested ulcer on anterior of lower leg   Neurological:      Mental Status: She is alert and oriented to person, place, and time     Psychiatric:         Mood and Affect: Mood normal          Behavior: Behavior normal        Emilee Lennox, MD

## 2022-10-26 NOTE — ASSESSMENT & PLAN NOTE
Lab Results   Component Value Date    HGBA1C 6 9 03/09/2022   Labs were ordered last visit but they are not done yet  I will reorder labs  No hypoglycemia    Blood sugar was around 140

## 2022-10-26 NOTE — ASSESSMENT & PLAN NOTE
Lab Results   Component Value Date    HGBA1C 6 9 03/09/2022   gabapentin 400 mg did not help  cymbalta not helping  Start lyrica   S/e d/w pt  pdmp ok

## 2022-11-02 DIAGNOSIS — E88.09 HYPOALBUMINEMIA: ICD-10-CM

## 2022-11-02 DIAGNOSIS — E61.1 IRON DEFICIENCY: ICD-10-CM

## 2022-11-02 DIAGNOSIS — R79.89 ABNORMAL LIVER FUNCTION TEST: Primary | ICD-10-CM

## 2022-11-02 LAB
ALBUMIN SERPL-MCNC: 3.3 G/DL (ref 3.6–5.1)
ALBUMIN/GLOB SERPL: 0.9 (CALC) (ref 1–2.5)
ALP SERPL-CCNC: 134 U/L (ref 37–153)
ALT SERPL-CCNC: 5 U/L (ref 6–29)
AST SERPL-CCNC: 10 U/L (ref 10–35)
BASOPHILS # BLD AUTO: 22 CELLS/UL (ref 0–200)
BASOPHILS NFR BLD AUTO: 0.3 %
BILIRUB SERPL-MCNC: 0.3 MG/DL (ref 0.2–1.2)
BUN SERPL-MCNC: 21 MG/DL (ref 7–25)
BUN/CREAT SERPL: 11 (CALC) (ref 6–22)
CALCIUM SERPL-MCNC: 8.8 MG/DL (ref 8.6–10.4)
CHLORIDE SERPL-SCNC: 100 MMOL/L (ref 98–110)
CHOLEST SERPL-MCNC: 132 MG/DL
CHOLEST/HDLC SERPL: 4.9 (CALC)
CO2 SERPL-SCNC: 37 MMOL/L (ref 20–32)
CREAT SERPL-MCNC: 1.84 MG/DL (ref 0.5–1.05)
EOSINOPHIL # BLD AUTO: 230 CELLS/UL (ref 15–500)
EOSINOPHIL NFR BLD AUTO: 3.2 %
ERYTHROCYTE [DISTWIDTH] IN BLOOD BY AUTOMATED COUNT: 14.1 % (ref 11–15)
EST. AVERAGE GLUCOSE BLD GHB EST-MCNC: 154 MG/DL
EST. AVERAGE GLUCOSE BLD GHB EST-SCNC: 8.5 MMOL/L
GFR/BSA.PRED SERPLBLD CYS-BASED-ARV: 30 ML/MIN/1.73M2
GLOBULIN SER CALC-MCNC: 3.8 G/DL (CALC) (ref 1.9–3.7)
GLUCOSE SERPL-MCNC: 145 MG/DL (ref 65–99)
HBA1C MFR BLD: 7 % OF TOTAL HGB
HCT VFR BLD AUTO: 32.4 % (ref 35–45)
HDLC SERPL-MCNC: 27 MG/DL
HGB BLD-MCNC: 9.9 G/DL (ref 11.7–15.5)
INR PPP: 3.7
LDLC SERPL CALC-MCNC: 79 MG/DL (CALC)
LYMPHOCYTES # BLD AUTO: 994 CELLS/UL (ref 850–3900)
LYMPHOCYTES NFR BLD AUTO: 13.8 %
MCH RBC QN AUTO: 30 PG (ref 27–33)
MCHC RBC AUTO-ENTMCNC: 30.6 G/DL (ref 32–36)
MCV RBC AUTO: 98.2 FL (ref 80–100)
MONOCYTES # BLD AUTO: 583 CELLS/UL (ref 200–950)
MONOCYTES NFR BLD AUTO: 8.1 %
NEUTROPHILS # BLD AUTO: 5371 CELLS/UL (ref 1500–7800)
NEUTROPHILS NFR BLD AUTO: 74.6 %
NONHDLC SERPL-MCNC: 105 MG/DL (CALC)
PLATELET # BLD AUTO: 277 THOUSAND/UL (ref 140–400)
PMV BLD REES-ECKER: 9.8 FL (ref 7.5–12.5)
POTASSIUM SERPL-SCNC: 4.7 MMOL/L (ref 3.5–5.3)
PROT SERPL-MCNC: 7.1 G/DL (ref 6.1–8.1)
PROTHROMBIN TIME: 34 SEC (ref 9–11.5)
RBC # BLD AUTO: 3.3 MILLION/UL (ref 3.8–5.1)
SODIUM SERPL-SCNC: 141 MMOL/L (ref 135–146)
TRIGL SERPL-MCNC: 161 MG/DL
TSH SERPL-ACNC: 3.21 MIU/L (ref 0.4–4.5)
WBC # BLD AUTO: 7.2 THOUSAND/UL (ref 3.8–10.8)

## 2022-11-16 ENCOUNTER — APPOINTMENT (EMERGENCY)
Dept: RADIOLOGY | Facility: HOSPITAL | Age: 62
End: 2022-11-16

## 2022-11-16 ENCOUNTER — HOSPITAL ENCOUNTER (INPATIENT)
Facility: HOSPITAL | Age: 62
LOS: 1 days | Discharge: HOME HEALTH | End: 2022-11-17
Attending: EMERGENCY MEDICINE | Admitting: STUDENT IN AN ORGANIZED HEALTH CARE EDUCATION/TRAINING PROGRAM

## 2022-11-16 ENCOUNTER — APPOINTMENT (INPATIENT)
Dept: NON INVASIVE DIAGNOSTICS | Facility: HOSPITAL | Age: 62
End: 2022-11-16

## 2022-11-16 ENCOUNTER — APPOINTMENT (INPATIENT)
Dept: CT IMAGING | Facility: HOSPITAL | Age: 62
End: 2022-11-16

## 2022-11-16 DIAGNOSIS — I50.9 ACUTE ON CHRONIC CONGESTIVE HEART FAILURE, UNSPECIFIED HEART FAILURE TYPE (HCC): Primary | ICD-10-CM

## 2022-11-16 DIAGNOSIS — S81.802A LEG WOUND, LEFT, INITIAL ENCOUNTER: ICD-10-CM

## 2022-11-16 DIAGNOSIS — J18.9 PNEUMONIA OF RIGHT UPPER LOBE DUE TO INFECTIOUS ORGANISM: ICD-10-CM

## 2022-11-16 DIAGNOSIS — D64.9 ANEMIA, UNSPECIFIED TYPE: ICD-10-CM

## 2022-11-16 PROBLEM — I51.7 CARDIOMEGALY: Status: ACTIVE | Noted: 2022-11-16

## 2022-11-16 LAB
2HR DELTA HS TROPONIN: 0 NG/L
4HR DELTA HS TROPONIN: -1 NG/L
ALBUMIN SERPL BCP-MCNC: 3.3 G/DL (ref 3.5–5)
ALP SERPL-CCNC: 121 U/L (ref 34–104)
ALT SERPL W P-5'-P-CCNC: 5 U/L (ref 7–52)
ANION GAP SERPL CALCULATED.3IONS-SCNC: 3 MMOL/L (ref 4–13)
AORTIC ROOT: 3.2 CM
ASCENDING AORTA: 3.3 CM
AST SERPL W P-5'-P-CCNC: 9 U/L (ref 13–39)
ATRIAL RATE: 82 BPM
ATRIAL RATE: 91 BPM
BACTERIA UR QL AUTO: NORMAL /HPF
BASOPHILS # BLD AUTO: 0.06 THOUSANDS/ÂΜL (ref 0–0.1)
BASOPHILS NFR BLD AUTO: 1 % (ref 0–1)
BILIRUB SERPL-MCNC: 0.35 MG/DL (ref 0.2–1)
BILIRUB UR QL STRIP: NEGATIVE
BNP SERPL-MCNC: 134 PG/ML (ref 0–100)
BUN SERPL-MCNC: 25 MG/DL (ref 5–25)
CALCIUM ALBUM COR SERPL-MCNC: 9 MG/DL (ref 8.3–10.1)
CALCIUM SERPL-MCNC: 8.4 MG/DL (ref 8.4–10.2)
CARDIAC TROPONIN I PNL SERPL HS: 12 NG/L
CARDIAC TROPONIN I PNL SERPL HS: 13 NG/L
CARDIAC TROPONIN I PNL SERPL HS: 13 NG/L
CHLORIDE SERPL-SCNC: 99 MMOL/L (ref 96–108)
CLARITY UR: CLEAR
CO2 SERPL-SCNC: 34 MMOL/L (ref 21–32)
COLOR UR: YELLOW
CREAT SERPL-MCNC: 1.87 MG/DL (ref 0.6–1.3)
EOSINOPHIL # BLD AUTO: 0.2 THOUSAND/ÂΜL (ref 0–0.61)
EOSINOPHIL NFR BLD AUTO: 2 % (ref 0–6)
ERYTHROCYTE [DISTWIDTH] IN BLOOD BY AUTOMATED COUNT: 16.4 % (ref 11.6–15.1)
FLUAV RNA RESP QL NAA+PROBE: NEGATIVE
FLUBV RNA RESP QL NAA+PROBE: NEGATIVE
FOLATE SERPL-MCNC: 8.3 NG/ML (ref 3.1–17.5)
FRACTIONAL SHORTENING: 31 % (ref 28–44)
GFR SERPL CREATININE-BSD FRML MDRD: 28 ML/MIN/1.73SQ M
GLUCOSE SERPL-MCNC: 113 MG/DL (ref 65–140)
GLUCOSE SERPL-MCNC: 150 MG/DL (ref 65–140)
GLUCOSE SERPL-MCNC: 154 MG/DL (ref 65–140)
GLUCOSE SERPL-MCNC: 205 MG/DL (ref 65–140)
GLUCOSE UR STRIP-MCNC: NEGATIVE MG/DL
HCT VFR BLD AUTO: 31.8 % (ref 34.8–46.1)
HGB BLD-MCNC: 8.8 G/DL (ref 11.5–15.4)
HGB UR QL STRIP.AUTO: ABNORMAL
IMM GRANULOCYTES # BLD AUTO: 0.19 THOUSAND/UL (ref 0–0.2)
IMM GRANULOCYTES NFR BLD AUTO: 2 % (ref 0–2)
INR PPP: 1.93 (ref 0.84–1.19)
INTERVENTRICULAR SEPTUM IN DIASTOLE (PARASTERNAL SHORT AXIS VIEW): 1.2 CM
INTERVENTRICULAR SEPTUM: 1.2 CM (ref 0.6–1.1)
KETONES UR STRIP-MCNC: NEGATIVE MG/DL
L PNEUMO1 AG UR QL IA.RAPID: NEGATIVE
LACTATE SERPL-SCNC: 0.8 MMOL/L (ref 0.5–2)
LEFT ATRIUM SIZE: 4.6 CM
LEFT INTERNAL DIMENSION IN SYSTOLE: 3.4 CM (ref 2.1–4)
LEFT VENTRICULAR INTERNAL DIMENSION IN DIASTOLE: 4.9 CM (ref 3.5–6)
LEFT VENTRICULAR POSTERIOR WALL IN END DIASTOLE: 1.2 CM
LEFT VENTRICULAR STROKE VOLUME: 63 ML
LEUKOCYTE ESTERASE UR QL STRIP: NEGATIVE
LVSV (TEICH): 63 ML
LYMPHOCYTES # BLD AUTO: 1.03 THOUSANDS/ÂΜL (ref 0.6–4.47)
LYMPHOCYTES NFR BLD AUTO: 11 % (ref 14–44)
MCH RBC QN AUTO: 30.1 PG (ref 26.8–34.3)
MCHC RBC AUTO-ENTMCNC: 27.7 G/DL (ref 31.4–37.4)
MCV RBC AUTO: 109 FL (ref 82–98)
MONOCYTES # BLD AUTO: 0.72 THOUSAND/ÂΜL (ref 0.17–1.22)
MONOCYTES NFR BLD AUTO: 7 % (ref 4–12)
NEUTROPHILS # BLD AUTO: 7.63 THOUSANDS/ÂΜL (ref 1.85–7.62)
NEUTS SEG NFR BLD AUTO: 77 % (ref 43–75)
NITRITE UR QL STRIP: NEGATIVE
NON-SQ EPI CELLS URNS QL MICRO: NORMAL /HPF
NRBC BLD AUTO-RTO: 1 /100 WBCS
OSMOLALITY UR: 464 MMOL/KG
PH UR STRIP.AUTO: 6 [PH]
PLATELET # BLD AUTO: 292 THOUSANDS/UL (ref 149–390)
PMV BLD AUTO: 9.9 FL (ref 8.9–12.7)
POTASSIUM SERPL-SCNC: 4.7 MMOL/L (ref 3.5–5.3)
PROCALCITONIN SERPL-MCNC: 0.11 NG/ML
PROT SERPL-MCNC: 7.6 G/DL (ref 6.4–8.4)
PROT UR STRIP-MCNC: ABNORMAL MG/DL
PROTHROMBIN TIME: 22.5 SECONDS (ref 11.6–14.5)
QRS AXIS: 43 DEGREES
QRS AXIS: 58 DEGREES
QRS AXIS: 72 DEGREES
QRSD INTERVAL: 78 MS
QRSD INTERVAL: 88 MS
QRSD INTERVAL: 94 MS
QT INTERVAL: 340 MS
QT INTERVAL: 342 MS
QT INTERVAL: 362 MS
QTC INTERVAL: 440 MS
QTC INTERVAL: 454 MS
QTC INTERVAL: 477 MS
RA PRESSURE ESTIMATED: 15 MMHG
RBC # BLD AUTO: 2.92 MILLION/UL (ref 3.81–5.12)
RBC #/AREA URNS AUTO: NORMAL /HPF
RSV RNA RESP QL NAA+PROBE: NEGATIVE
RV PSP: 56 MMHG
SARS-COV-2 RNA RESP QL NAA+PROBE: NEGATIVE
SL CV PED ECHO LEFT VENTRICLE DIASTOLIC VOLUME (MOD BIPLANE) 2D: 110 ML
SL CV PED ECHO LEFT VENTRICLE SYSTOLIC VOLUME (MOD BIPLANE) 2D: 48 ML
SODIUM SERPL-SCNC: 136 MMOL/L (ref 135–147)
SP GR UR STRIP.AUTO: 1.02 (ref 1–1.03)
T WAVE AXIS: 109 DEGREES
T WAVE AXIS: 20 DEGREES
T WAVE AXIS: 69 DEGREES
TR MAX PG: 41 MMHG
TR PEAK VELOCITY: 3.2 M/S
TRICUSPID VALVE PEAK REGURGITATION VELOCITY: 3.22 M/S
TSH SERPL DL<=0.05 MIU/L-ACNC: 2.73 UIU/ML (ref 0.45–4.5)
UROBILINOGEN UR QL STRIP.AUTO: 0.2 E.U./DL
VENTRICULAR RATE: 101 BPM
VENTRICULAR RATE: 117 BPM
VENTRICULAR RATE: 95 BPM
VIT B12 SERPL-MCNC: 611 PG/ML (ref 100–900)
WBC # BLD AUTO: 9.83 THOUSAND/UL (ref 4.31–10.16)
WBC #/AREA URNS AUTO: NORMAL /HPF

## 2022-11-16 RX ORDER — SENNOSIDES 8.6 MG
1 TABLET ORAL 2 TIMES DAILY
Status: DISCONTINUED | OUTPATIENT
Start: 2022-11-16 | End: 2022-11-17 | Stop reason: HOSPADM

## 2022-11-16 RX ORDER — ACETAMINOPHEN 325 MG/1
650 TABLET ORAL EVERY 6 HOURS PRN
Status: DISCONTINUED | OUTPATIENT
Start: 2022-11-16 | End: 2022-11-17 | Stop reason: HOSPADM

## 2022-11-16 RX ORDER — FUROSEMIDE 10 MG/ML
20 INJECTION INTRAMUSCULAR; INTRAVENOUS
Status: DISCONTINUED | OUTPATIENT
Start: 2022-11-16 | End: 2022-11-16

## 2022-11-16 RX ORDER — WARFARIN SODIUM 3 MG/1
3 TABLET ORAL EVERY EVENING
Status: DISCONTINUED | OUTPATIENT
Start: 2022-11-16 | End: 2022-11-17 | Stop reason: HOSPADM

## 2022-11-16 RX ORDER — GUAIFENESIN 600 MG/1
600 TABLET, EXTENDED RELEASE ORAL 2 TIMES DAILY
Status: DISCONTINUED | OUTPATIENT
Start: 2022-11-16 | End: 2022-11-17 | Stop reason: HOSPADM

## 2022-11-16 RX ORDER — LORAZEPAM 0.5 MG/1
0.5 TABLET ORAL
Status: DISCONTINUED | OUTPATIENT
Start: 2022-11-16 | End: 2022-11-17 | Stop reason: HOSPADM

## 2022-11-16 RX ORDER — ALBUTEROL SULFATE 90 UG/1
2 AEROSOL, METERED RESPIRATORY (INHALATION) EVERY 4 HOURS PRN
Status: DISCONTINUED | OUTPATIENT
Start: 2022-11-16 | End: 2022-11-17 | Stop reason: HOSPADM

## 2022-11-16 RX ORDER — METOPROLOL TARTRATE 50 MG/1
50 TABLET, FILM COATED ORAL 2 TIMES DAILY
Status: DISCONTINUED | OUTPATIENT
Start: 2022-11-16 | End: 2022-11-17 | Stop reason: HOSPADM

## 2022-11-16 RX ORDER — OMEGA-3S/DHA/EPA/FISH OIL/D3 300MG-1000
400 CAPSULE ORAL DAILY
Status: DISCONTINUED | OUTPATIENT
Start: 2022-11-16 | End: 2022-11-17

## 2022-11-16 RX ORDER — ATORVASTATIN CALCIUM 10 MG/1
10 TABLET, FILM COATED ORAL DAILY
Status: DISCONTINUED | OUTPATIENT
Start: 2022-11-16 | End: 2022-11-17 | Stop reason: HOSPADM

## 2022-11-16 RX ORDER — LEVALBUTEROL 1.25 MG/.5ML
1.25 SOLUTION, CONCENTRATE RESPIRATORY (INHALATION)
Status: DISCONTINUED | OUTPATIENT
Start: 2022-11-16 | End: 2022-11-17 | Stop reason: HOSPADM

## 2022-11-16 RX ORDER — DULOXETIN HYDROCHLORIDE 30 MG/1
30 CAPSULE, DELAYED RELEASE ORAL 2 TIMES DAILY
Status: DISCONTINUED | OUTPATIENT
Start: 2022-11-16 | End: 2022-11-16

## 2022-11-16 RX ORDER — INSULIN LISPRO 100 [IU]/ML
2-12 INJECTION, SOLUTION INTRAVENOUS; SUBCUTANEOUS
Status: DISCONTINUED | OUTPATIENT
Start: 2022-11-16 | End: 2022-11-16

## 2022-11-16 RX ORDER — CEFTRIAXONE 2 G/50ML
2000 INJECTION, SOLUTION INTRAVENOUS ONCE
Status: COMPLETED | OUTPATIENT
Start: 2022-11-16 | End: 2022-11-16

## 2022-11-16 RX ORDER — CEFTRIAXONE 1 G/50ML
1000 INJECTION, SOLUTION INTRAVENOUS EVERY 24 HOURS
Status: DISCONTINUED | OUTPATIENT
Start: 2022-11-17 | End: 2022-11-17

## 2022-11-16 RX ORDER — INSULIN LISPRO 100 [IU]/ML
5 INJECTION, SOLUTION INTRAVENOUS; SUBCUTANEOUS
Status: DISCONTINUED | OUTPATIENT
Start: 2022-11-16 | End: 2022-11-16

## 2022-11-16 RX ORDER — FUROSEMIDE 10 MG/ML
20 INJECTION INTRAMUSCULAR; INTRAVENOUS DAILY
Status: DISCONTINUED | OUTPATIENT
Start: 2022-11-17 | End: 2022-11-17

## 2022-11-16 RX ORDER — AMLODIPINE BESYLATE 5 MG/1
5 TABLET ORAL DAILY
Status: DISCONTINUED | OUTPATIENT
Start: 2022-11-16 | End: 2022-11-17 | Stop reason: HOSPADM

## 2022-11-16 RX ORDER — LEVOTHYROXINE SODIUM 0.1 MG/1
100 TABLET ORAL
Status: DISCONTINUED | OUTPATIENT
Start: 2022-11-16 | End: 2022-11-17 | Stop reason: HOSPADM

## 2022-11-16 RX ORDER — INSULIN GLARGINE 100 [IU]/ML
15 INJECTION, SOLUTION SUBCUTANEOUS
Status: DISCONTINUED | OUTPATIENT
Start: 2022-11-16 | End: 2022-11-16

## 2022-11-16 RX ORDER — DOCUSATE SODIUM 100 MG/1
100 CAPSULE, LIQUID FILLED ORAL 2 TIMES DAILY
Status: DISCONTINUED | OUTPATIENT
Start: 2022-11-16 | End: 2022-11-17 | Stop reason: HOSPADM

## 2022-11-16 RX ORDER — PREGABALIN 50 MG/1
50 CAPSULE ORAL 3 TIMES DAILY
Status: DISCONTINUED | OUTPATIENT
Start: 2022-11-16 | End: 2022-11-17

## 2022-11-16 RX ORDER — FAMOTIDINE 20 MG/1
20 TABLET, FILM COATED ORAL DAILY
Status: DISCONTINUED | OUTPATIENT
Start: 2022-11-16 | End: 2022-11-17 | Stop reason: HOSPADM

## 2022-11-16 RX ADMIN — AMLODIPINE BESYLATE 5 MG: 5 TABLET ORAL at 10:08

## 2022-11-16 RX ADMIN — LORAZEPAM 0.5 MG: 0.5 TABLET ORAL at 20:39

## 2022-11-16 RX ADMIN — ATORVASTATIN CALCIUM 10 MG: 10 TABLET, FILM COATED ORAL at 10:08

## 2022-11-16 RX ADMIN — METOPROLOL TARTRATE 50 MG: 50 TABLET, FILM COATED ORAL at 09:59

## 2022-11-16 RX ADMIN — COLLAGENASE SANTYL: 250 OINTMENT TOPICAL at 10:46

## 2022-11-16 RX ADMIN — SODIUM CHLORIDE 500 ML: 0.9 INJECTION, SOLUTION INTRAVENOUS at 05:13

## 2022-11-16 RX ADMIN — LEVALBUTEROL HYDROCHLORIDE 1.25 MG: 1.25 SOLUTION, CONCENTRATE RESPIRATORY (INHALATION) at 19:48

## 2022-11-16 RX ADMIN — CYANOCOBALAMIN TAB 500 MCG 500 MCG: 500 TAB at 10:07

## 2022-11-16 RX ADMIN — WARFARIN SODIUM 3 MG: 3 TABLET ORAL at 17:05

## 2022-11-16 RX ADMIN — PREGABALIN 50 MG: 50 CAPSULE ORAL at 17:05

## 2022-11-16 RX ADMIN — METOPROLOL TARTRATE 50 MG: 50 TABLET, FILM COATED ORAL at 20:01

## 2022-11-16 RX ADMIN — PREGABALIN 50 MG: 50 CAPSULE ORAL at 10:08

## 2022-11-16 RX ADMIN — CEFTRIAXONE 2000 MG: 2 INJECTION, SOLUTION INTRAVENOUS at 09:00

## 2022-11-16 RX ADMIN — SENNOSIDES 8.6 MG: 8.6 TABLET, FILM COATED ORAL at 10:08

## 2022-11-16 RX ADMIN — FUROSEMIDE 20 MG: 10 INJECTION, SOLUTION INTRAMUSCULAR; INTRAVENOUS at 17:14

## 2022-11-16 RX ADMIN — GUAIFENESIN 600 MG: 600 TABLET, EXTENDED RELEASE ORAL at 10:07

## 2022-11-16 RX ADMIN — ACETAMINOPHEN 650 MG: 325 TABLET ORAL at 20:01

## 2022-11-16 RX ADMIN — LEVOTHYROXINE SODIUM 100 MCG: 100 TABLET ORAL at 10:08

## 2022-11-16 RX ADMIN — DULOXETINE HYDROCHLORIDE 30 MG: 30 CAPSULE, DELAYED RELEASE ORAL at 10:07

## 2022-11-16 RX ADMIN — DOCUSATE SODIUM 100 MG: 100 CAPSULE, LIQUID FILLED ORAL at 10:07

## 2022-11-16 RX ADMIN — FAMOTIDINE 20 MG: 20 TABLET ORAL at 09:59

## 2022-11-16 RX ADMIN — PREGABALIN 50 MG: 50 CAPSULE ORAL at 20:01

## 2022-11-16 RX ADMIN — DULOXETINE HYDROCHLORIDE 30 MG: 30 CAPSULE, DELAYED RELEASE ORAL at 17:05

## 2022-11-16 RX ADMIN — IPRATROPIUM BROMIDE 0.5 MG: 0.5 SOLUTION RESPIRATORY (INHALATION) at 19:48

## 2022-11-16 RX ADMIN — CHOLECALCIFEROL TAB 10 MCG (400 UNIT) 400 UNITS: 10 TAB at 10:08

## 2022-11-16 RX ADMIN — GUAIFENESIN 600 MG: 600 TABLET, EXTENDED RELEASE ORAL at 20:01

## 2022-11-16 NOTE — ASSESSMENT & PLAN NOTE
CT chest showing cardiomegaly and trace bilateral effusions  Last echo September 30, 2020 showed EF of 55-60    Will update echo    Will place on lasix 20 mg IV bid as she is not on diuretics at home

## 2022-11-16 NOTE — ED NOTES
Patient transported to [address] [place] [address] , [facility]  11/16/22 [id number]

## 2022-11-16 NOTE — ASSESSMENT & PLAN NOTE
Patient presented from home due to fevers chills and dry cough  Chest x-ray showing Cardiomegaly with small effusions and possible interstitial edema  CT chest showing  1  New right upper lobe consolidation concerning for pneumonia  2   Mild interlobular septal thickening and trace bilateral pleural effusions can represent mild pulmonary edema  3   Cardiomegaly      Respiratory protocol, incentive spirometry, Mucinex  Follow-up blood culture  Follow-up sputum culture  Follow-up strep and Legionella    Status post ceftriaxone in the ER, will continue

## 2022-11-16 NOTE — ASSESSMENT & PLAN NOTE
Lab Results   Component Value Date    HGBA1C 7 0 (H) 11/01/2022       No results for input(s): POCGLU in the last 72 hours  Blood Sugar Average: Last 72 hrs: Insulin sliding scale  Carb consistent diet  Start on Lantus 10 units q h s    Lispro 5 units with meals  Adjust accordingly

## 2022-11-16 NOTE — QUICK NOTE
Spoke to patient and significant other later in the evening  Discussed patient's medication regiment  Patient and her significant other states that patient is is no longer taking insulin or any oral medications for diabetes  Informed them that patient's A1c is 7 0 and patient should be on medications for her diabetes  Currently they are both refusing to take any medications  They state that their PCP told him that she does not need to be on any medications  Currently refusing to be on insulin, will discontinue  I encouraged them to follow-up with her PCP to further discuss her diabetes management as they appear to be more comfortable with their long term provider

## 2022-11-16 NOTE — OCCUPATIONAL THERAPY NOTE
Occupational Therapy Evaluation      Dennis Look    11/16/2022    Patient Active Problem List   Diagnosis   • Anemia due to stage 3b chronic kidney disease ([place] [address] )   • Chronic kidney disease, stage III (moderate) (HCC)   • Hypertensive chronic kidney disease with stage 1 through stage 4 chronic kidney disease, or unspecified chronic kidney disease   • Persistent proteinuria   • Iron deficiency   • Dyslipidemia   • Hyperparathyroidism ([facility] )   • SOB (shortness of breath)   • Cellulitis   • H/O right nephrectomy   • Severe sepsis (HCC)   • Acute renal failure superimposed on chronic kidney disease (HCC)   • Paroxysmal atrial fibrillation (HCC)   • COPD with asthma ([place] [address] )   • Hypothyroid   • Chronic constipation   • GERD (gastroesophageal reflux disease)   • Anxiety   • Secondary hyperparathyroidism of renal origin ([place] [address] )   • Morbid (severe) obesity due to excess calories ([facility])   • Abdominal pain   • Supratherapeutic INR   • Depression, recurrent (HCC)   • Diabetes mellitus type 2 in obese ([place] [address] )   • Pre-ulcerative corn or callous   • Diabetic nephropathy associated with type 2 diabetes mellitus ([place] [address] )   • Tinea   • Chronic respiratory failure with hypoxia ([place] [address] )   • Diabetic polyneuropathy associated with type 2 diabetes mellitus ([place] [address] )   • Leg wound, left, initial encounter   • Diabetic neuropathy ([place] [address] )   • Pneumonia due to infectious organism   • Cardiomegaly       Past Medical History:   Diagnosis Date   • Anemia    • Arthritis    • Cancer of kidney ([facility] )    • Chronic kidney disease    • Diabetes mellitus ([facility] )    • Disease of thyroid gland    • HLD (hyperlipidemia)    • Hypertension    • Ovarian cancer ([facility] )    • Pneumonia        Past Surgical History:   Procedure Laterality Date   • CHOLECYSTECTOMY     • CT GUIDED [facility] [place] DRAINAGE CATHETER PLACEMENT  5/16/2016   • GALLBLADDER SURGERY  05/01/2004   • HYSTERECTOMY  06/01/2018   • NEPHRECTOMY Right 08/02/2018 11/16/22 1337   OT Last Visit   OT Visit Date 11/16/22   Note Type   Note type Evaluation   Pain Assessment   Pain Assessment Tool 0-10   Pain Score No Pain   Restrictions/Precautions   Weight Bearing Precautions Per Order No   Other Precautions Cognitive; Chair Alarm; Bed Alarm;Multiple lines;O2;Fall Risk  (2L O2 NC)   Home Living   Type of Home Apartment   Home Layout One level;Performs ADLs on one level; Able to live on main level with bedroom/bathroom; Ramped entrance   [employer] Grab bars in shower; Shower chair;Commode   Bathroom Accessibility Accessible via wheelchair   [address] [place] [address],[address]; Wheelchair-electric; Wheelchair-manual;Hospital bed   Prior Function   Level of [place] Needs assistance with ADLs; Needs assistance with IADLS;Modified independent with wheelchair   Lives With Significant other  Buckner Petit)   [address] Help From Family   IADLs Family/Friend/Other provides transportation; Family/Friend/Other provides meals; Family/Friend/Other provides medication management   Falls in the last 6 months 1 to 4  (1 per sig other Taylor Couch where pt slid off of chair onto ground (reason for admission))   Comments Pt and sig other report pt was ambulatory with RW to bathroom and back but recently has been utilizing electric w/c to mobilize to bathroom  Pt is usually able to transfer (I) from hospital bed<>w/c but has been weaker lately  Taylor Couch assists with all ADLs baseline     Lifestyle   Autonomy Pt resides in a 1 level apartment with her sig other Derek Couch who is her caregiver   Reciprocal Relationships Supportive sig other Taylor Couch who is pt caregiver   Intrinsic Gratification Enjoys watching TV   General   Family/Caregiver Present Yes  (Sig other Derek Couch present for session)   Subjective   Subjective "My stomach had gotten so big it's been hard to breathe"   ADL   Eating Assistance 6  Modified independent   Eating Deficit Beverage management   UB Bathing Assistance Unable to assess   LB Bathing Assistance Unable to assess   LB Dressing Assistance 1  Total Assistance   LB Dressing Deficit Don/doff R sock; Don/doff L sock   Toileting Assistance  2  Maximal Assistance   Toileting Deficit Setup;Use of bedpan/urinal setup;Perineal hygiene  (Pt sig other Teressa Christensen completing panchito care for pt in sidelying upon arrival)   Bed Mobility   Supine to Sit 4  Minimal assistance   Additional items Assist x 1;HOB elevated; Bedrails; Increased time required;Verbal cues;LE management  (assist of 1 person from caregiver Teressa Christensen)   Sit to Supine Unable to assess   Additional Comments Pt OOB to recliner at end of session   Transfers   Sit to Stand 4  Minimal assistance   Additional items Assist x 1; Increased time required;Verbal cues  (assist of 1 person from caregiver Teressa Christensen)   Stand to Sit 4  Minimal assistance   Additional items Assist x 1; Increased time required;Verbal cues  (assist of 1 person from caregiver Teressa Christensen)   Stand pivot 4  Minimal assistance   Additional items Assist x 1; Increased time required;Verbal cues;Armrests  (assist of 1 person from caregiver Teressa Christensen)   Balance   Static Sitting Fair   Dynamic Sitting Fair -   Static Standing Poor +   Dynamic Standing Poor +   Activity Tolerance   Activity Tolerance Patient limited by fatigue   Medical Staff Made Aware Care coordinated with PT Shaista   Nurse Made Aware yes, RN ok to see pt   RUE Assessment   RUE Assessment WFL   LUE Assessment   LUE Assessment WFL   Hand Function   Gross Motor Coordination Functional   Fine Motor Coordination Functional   Sensation   Light Touch No apparent deficits   Vision-Basic Assessment   Current Vision Does not wear glasses   Cognition   Overall Cognitive Status Impaired   Arousal/Participation Alert; Cooperative   Attention Attends with cues to redirect   Orientation Level Oriented to person;Oriented to place;Oriented to situation   Memory Decreased recall of precautions;Decreased recall of recent events   Following Commands Follows one step commands with increased time or repetition   Comments Pleasant and agreeable to session  Questionable insight into deficits, limited safety awareness  Relies on caregiver Koko Charles  Assessment   Limitation Decreased ADL status; Decreased Safe judgement during ADL;Decreased cognition;Decreased endurance;Decreased self-care trans;Decreased high-level ADLs   Prognosis Good   Assessment Pt is a 60 y o  female seen for OT evaluation s/p admit to [address] [place] [address] on 11/16/2022 w/Pneumonia due to infectious organism  Orders received for OT evaluation and treatment  Pt identified during session via name and wristband  Comorbidities affecting patient at time of evaluation include: cardiomegaly, chronic respiratory failure with hypoxia, Afib, anxiety, CKD, COPD and diabetes  Personal factors affecting patient at time of evaluation include: limited caregiver support, limited insight into deficits, decreased initiation and engagement, difficulty performing ADLs and difficulty performing IADLs  PTA, patient was independent with functional mobility with RW vs electric w/c, requiring assist for ADLS, requiring assist for IADLS and living with sig other Koko Charles in a 1st floor apartment with a ramped entrance  The evaluation identifies the following performance deficits: weakness, decreased endurance, increased fall risk, new onset of impairment of functional mobility, decreased ADLS, decreased IADLS, decreased activity tolerance, decreased safety awareness, impaired judgement, decreased cognition and decreased strength, that result in activity limitations (as is outlined above in flowsheet)  Based on the OT evaluation outcomes, functional performance deficits, and assessment findings, pt has been identified as high complexity, because the patient presents with comorbidites that affect occupational performance and required significant modification of tasks or assistance with consideration of multiple treatment options   The patient's raw score on the AM-PAC Daily Activity inpatient short form is 15, standardized score is 34 69, less than 39 4  From an OT standpoint, patients at this level may benefit from d/c to Post acute rehabilitation services  Pt will benefit from continued IPOT treatment to address above deficits and maximize level of independence with ADLs and functional mobility in the areas of: bathing/ shower, dressing, toilet hygiene, transfer to all surfaces and functional ambulation  Goals   Patient Goals to go home   LTG Time Frame 10-14   Long Term Goal #1 see goals below   Plan   Treatment Interventions ADL retraining;Functional transfer training;UE strengthening/ROM; Endurance training;Cognitive reorientation;Patient/family training;Equipment evaluation/education; Compensatory technique education;Continued evaluation; Energy conservation; Activityengagement   Goal Expiration Date 11/26/22   OT Treatment Day 0   OT Frequency 2-3x/wk   Recommendation   OT Discharge Recommendation Post acute rehabilitation services  (vs HHOT pending progress towards goals and comfort of caregiver with assisting pt at min A x 1 level for ADLs and functional mobility)   Additional Comments  Pt seen as a co-eval with PT due to the patient's co-morbidities, clinically unstable presentation, and present impairments which are a regression from the patient's baseline  Additional Comments 2 Spoke to pt and sig other about goals of therapy  Pt and sig other adamately refusing rehab at this time as pt was not treated well last rehab admission  Pt and sig other wish for home services to be setup     [facility]   Lower Body Dressing 1   Bathing 2   Toileting 2   Upper Body Dressing 3   Grooming 3   Eating 4   Daily Activity Raw Score 15   Daily Activity Standardized Score (Calc for Raw Score >=11) 34 69   AM[facility] Applied Cognition Inpatient   Following a Speech/Presentation 3   Understanding Ordinary Conversation 4   Taking Medications 3 Remembering Where Things Are Placed or Put Away 4   Remembering List of 4-5 Errands 2   Taking Care of Complicated Tasks 2   Applied Cognition Raw Score 18   Applied Cognition Standardized Score 38 07   End of Consult   Patient Position at End of Consult Bedside chair; All needs within reach;Bed/Chair alarm activated   Nurse Communication Nurse aware of consult     GOALS:    *Goals established to promote patient goal of to go home:      *ADL transfers with (S) for inc'd independence with ADLs/purposeful tasks    *Patient will perform grooming tasks standing at sink with (S) in order to increase overall independence     *UB ADL with (S) for inc'd independence with self cares    *LB ADL with Mod (A) using AE prn for inc'd independence with self cares    *Toileting with Mod (A) for clothing management and hygiene to increase hygiene/thoroughness in order to reduce caregiver burden    *Increase stand tolerance x 1  m for inc'd tolerance with standing purposeful tasks    *Bed mobility- (S) for inc'd independence to manage own comfort and initiate EOB & OOB purposeful tasks    *Patient will verbalize 3 safety awareness/ principles to prevent falls in the home setting  *Patient will verbalize and demonstrate use of energy conservation/deep breathing techniques and work simplification skills during functional activities with no verbal cues  *Patient will increase OOB/sitting tolerance to 2-4 hours per day to increase participation in self-care and leisure tasks with no s/s of exertion  *Patient will improve functional activity tolerance to 15 minutes of sustained functional tasks to increase participation in basic self-care and decrease assistance level       Marixa Joyce, OTR/L

## 2022-11-16 NOTE — ED NOTES
Patient placed on a purewick        Dave Thompsonuth, [address] [place] [address]  11/16/22 0162

## 2022-11-16 NOTE — RESPIRATORY THERAPY NOTE
RT Protocol Note  Sara العراقي 60 y o  female MRN: [id number]  Unit/Bed#: -01 Encounter: [id number]    Assessment    Principal Problem:    Pneumonia due to infectious organism  Active Problems:    Chronic kidney disease, stage III (moderate) (HCC)    Paroxysmal atrial fibrillation (HCC)    COPD with asthma ([place] [address] )    Anxiety    Diabetes mellitus type 2 in obese ([place] [address] )    Chronic respiratory failure with hypoxia (HCC)    Leg wound, left, initial encounter    Cardiomegaly      Home Pulmonary Medications:  Albuterol  Ipratropium    Home Devices/Therapy: Home O2    Past Medical History:   Diagnosis Date    Anemia     Arthritis     Cancer of kidney ([place] [address] )     Chronic kidney disease     Diabetes mellitus (HCC)     Disease of thyroid gland     HLD (hyperlipidemia)     Hypertension     Ovarian cancer ([place] [address] )     Pneumonia      Social History     Socioeconomic History    Marital status: Single     Spouse name: None    Number of children: 0    Years of education: None    Highest education level: 12th grade   Occupational History    None   Tobacco Use    Smoking status: Former     Packs/day: 3 00     Years: 30 00     Pack years: 90 00     Types: Cigarettes     Quit date: 10/22/2010     Years since quittin 0    Smokeless tobacco: Former     Quit date: 2011    Tobacco comments:     quit approx   9 years ago   Vaping Use    Vaping Use: Never used   Substance and Sexual Activity    Alcohol use: Never     Comment: n/a    Drug use: Yes     Types: Marijuana     Comment: smokes with girlfriend when anxious    Sexual activity: Yes     Partners: Female   Other Topics Concern    None   Social History Narrative    · Most recent tobacco use screenin2020      · Do you currently or have you served in the [employer]:   No      · Were you activated, into active duty, as a member of the [employer] or as a Reservist:   No      ·     Last modified by [email or web address]   2020, 10:39      Social Determinants of Health Financial Resource Strain: Not on file   Food Insecurity: Not on file   Transportation Needs: Not on file   Physical Activity: Not on file   Stress: Not on file   Social Connections: Not on file   Intimate Partner Violence: Not on file   Housing Stability: Not on file       Subjective         Objective    Physical Exam:   Assessment Type: Assess only  General Appearance: Alert, Awake  Respiratory Pattern: Normal  Chest Assessment: Chest expansion symmetrical  Bilateral Breath Sounds: Diminished  O2 Device: 4L    Vitals:  Blood pressure 110/73, pulse 77, temperature 97 9 °F (36 6 °C), temperature source Oral, resp  rate 16, height 5' 4" (1 626 m), weight (!) 154 kg (340 lb), SpO2 97 %, not currently breastfeeding  Imaging and other studies: I have personally reviewed pertinent reports  O2 Device: 4L     Plan    Respiratory Plan: Home Bronchodilator Patient pathway        Resp Comments: Assessed pt per protocol  Pt has a Asthma history  Breath sounds were diminished  Pt wears 4 liters at baseline  She is currently at baseline  Pt states she takes Albuterol and Ipratropium about 6 times a day plus an Albuterol inhaler  Informed pt that her pulmonary meds are schedule as needed and she might be over medicating  Will give breathing treatments TID   Respiratory to follow,

## 2022-11-16 NOTE — PLAN OF CARE
Problem: OCCUPATIONAL THERAPY ADULT  Goal: Performs self-care activities at highest level of function for planned discharge setting  See evaluation for individualized goals  Description: Treatment Interventions: ADL retraining, Functional transfer training, UE strengthening/ROM, Endurance training, Cognitive reorientation, Patient/family training, Equipment evaluation/education, Compensatory technique education, Continued evaluation, Energy conservation, Activityengagement          See flowsheet documentation for full assessment, interventions and recommendations  Note: Limitation: Decreased ADL status, Decreased Safe judgement during ADL, Decreased cognition, Decreased endurance, Decreased self-care trans, Decreased high-level ADLs  Prognosis: Good  Assessment: Pt is a 60 y o  female seen for OT evaluation s/p admit to [address] [place] [address] on 11/16/2022 w/Pneumonia due to infectious organism  Orders received for OT evaluation and treatment  Pt identified during session via name and wristband  Comorbidities affecting patient at time of evaluation include: cardiomegaly, chronic respiratory failure with hypoxia, Afib, anxiety, CKD, COPD and diabetes  Personal factors affecting patient at time of evaluation include: limited caregiver support, limited insight into deficits, decreased initiation and engagement, difficulty performing ADLs and difficulty performing IADLs  PTA, patient was independent with functional mobility with RW vs electric w/c, requiring assist for ADLS, requiring assist for IADLS and living with sig other Cookie Bosworth in a 1st floor apartment with a ramped entrance  The evaluation identifies the following performance deficits: weakness, decreased endurance, increased fall risk, new onset of impairment of functional mobility, decreased ADLS, decreased IADLS, decreased activity tolerance, decreased safety awareness, impaired judgement, decreased cognition and decreased strength, that result in activity limitations (as is outlined above in flowsheet)  Based on the OT evaluation outcomes, functional performance deficits, and assessment findings, pt has been identified as high complexity, because the patient presents with comorbidites that affect occupational performance and required significant modification of tasks or assistance with consideration of multiple treatment options  The patient's raw score on the AM-PAC Daily Activity inpatient short form is 15, standardized score is 34 69, less than 39 4  From an OT standpoint, patients at this level may benefit from d/c to Post acute rehabilitation services  Pt will benefit from continued IPOT treatment to address above deficits and maximize level of independence with ADLs and functional mobility in the areas of: bathing/ shower, dressing, toilet hygiene, transfer to all surfaces and functional ambulation       OT Discharge Recommendation: Home with home health rehabilitation     Viry Balderas, OTR/L

## 2022-11-16 NOTE — PLAN OF CARE
Problem: PHYSICAL THERAPY ADULT  Goal: Performs mobility at highest level of function for planned discharge setting  See evaluation for individualized goals  Description: Treatment/Interventions: Functional transfer training, LE strengthening/ROM, Therapeutic exercise, Endurance training, Equipment eval/education, Bed mobility, Gait training, Patient/family training, Cognitive reorientation          See flowsheet documentation for full assessment, interventions and recommendations  11/16/2022 1506 by Aurora Ying PT  Note: Prognosis: Fair  Problem List: Decreased strength, Decreased endurance, Impaired balance, Decreased mobility, Decreased cognition, Impaired judgement, Decreased safety awareness, Obesity, Decreased skin integrity  Assessment: Litzy Madison is a 60 y o  Female who presents to [address] [place] [address] on 11/16/2022 due to fevers, chills, cough and diagnosis of pneumonia  Orders for PT eval and treat received, w/ activity orders of up and out of bed as tolerated  Comorbidities affecting pt at time of evaluation include: CKD, COPD, asthma, anxiety, DM, neuropathy, depression  Personal factors affecting DC include: ambulating w/ assistive device, inability to ambulate household distances, inability to navigate level surfaces w/o external assistance, decreased cognition, positive fall history, inability to perform IADLs and inability to perform ADLs  At baseline, pt mobilizes mod I w/ RW to/froom bathroom w/ pt most recently performing transfers to/from electric scooter and w/ 1 fall(s) in the previous 6 months  Upon evaluation, pt presents w/ the following deficits: weakness, altered sensation, impaired skin integrity, impaired balance, decreased endurance and limited overall mobility tolerance  Pt currently requires min Ax1 for transfers   Pt's clinical presentation is unstable/unpredictable due to need for assist w/ all phases of mobility when usually mobilizing independently, need for supplemental oxygen in order to maintain oxygen saturation, need for input for task focus and mobility technique, recent drastic decline in mobility compared to baseline and recent history of falls  Pt is at an increased risk of falls due to impaired balance, limited activity tolerance, decreased safety awareness  From a PT/mobility standpoint, given the above findings, DC recommendation is: Home w/ HHPT vs post acute rehab pending pt's progress w/ functional mobility and level of assistance available upon DC  During current admission, pt will benefit from continued skilled inpatient PT in the acute care setting in order to address the above deficits and to maximize function and mobility prior to DC from acute care  PT Discharge Recommendation: Home with home health rehabilitation (vs STR pending pt progress and level of assistance available upon DC)    See flowsheet documentation for full assessment

## 2022-11-16 NOTE — PHYSICAL THERAPY NOTE
PHYSICAL THERAPY EVALUATION NOTE          Patient Name: Flakito Boyd  RJCNU'H Date: 2022      AGE:   60 y o  Mrn:   [id number]  ADMIT DX:  Fever [R50 9]  Anemia, unspecified type [D64 9]  Acute on chronic congestive heart failure, unspecified heart failure type ([place] [address] ) [I50 9]    Past Medical History:  Past Medical History:   Diagnosis Date    Anemia     Arthritis     Cancer of kidney ([place] [address] )     Chronic kidney disease     Diabetes mellitus ([place] [address] )     Disease of thyroid gland     HLD (hyperlipidemia)     Hypertension     Ovarian cancer ([place] [address] )     Pneumonia        Past Surgical History:  Past Surgical History:   Procedure Laterality Date    CHOLECYSTECTOMY      CT GUIDED PERC DRAINAGE CATHETER PLACEMENT  2016    GALLBLADDER SURGERY  2004    HYSTERECTOMY  2018    NEPHRECTOMY Right 2018     Length Of Stay: 0        PHYSICAL THERAPY EVALUATION:    Patient's identity confirmed via 2 patient identifiers (full name and ) at start of session       22 1317   PT Last Visit   PT Visit Date 22   Note Type   Note type Evaluation   Pain Assessment   Pain Assessment Tool 0-10   Pain Score No Pain   Restrictions/Precautions   Weight Bearing Precautions Per Order No   Other Precautions Cognitive; Chair Alarm; Bed Alarm;O2;Fall Risk  (2L O2 via NC)   Home Living   Type of [address] [place] [address] One level;Performs ADLs on one level;Ramped entrance   Bathroom Shower/Tub Walk-in shower   Bathroom Toilet Standard   Bathroom Equipment Grab bars in shower; Shower chair;Commode   Bathroom Accessibility Accessible via wheelchair   [address] [place] [address],[address]; Wheelchair-manual;Wheelchair-electric;Hospital bed   Additional Comments Pt lives w/ her girlfriend "Shivani Hunt"   Prior Function   Level of [place] Independent with functional mobility;Modified independent with wheelchair;Needs assistance with ADLs; Independent with IADLS   Lives With Significant other  Darrell Gutierrez)   Receives Help From Family   IADLs Family/Friend/Other provides transportation; Family/Friend/Other provides meals; Family/Friend/Other provides medication management   Falls in the last 6 months 1 to 4  (1 slide off of the chair onto the floor)   Comments Pt and her significant other report pt was previously ambulating mod I w/ RW to/from bathroom, most recently transferring mod I from hospital bed<>electric WC due to progressing weakness   General   Family/Caregiver Present Yes  Darrell Matt, pt's girlfriend)   Cognition   Overall Cognitive Status Impaired   Arousal/Participation Cooperative   Attention Attends with cues to redirect   Orientation Level Oriented to person;Oriented to place; Disoriented to situation   Memory Decreased recall of recent events;Decreased recall of precautions   Following Commands Follows one step commands with increased time or repetition   Comments Pt pleasant and cooperative throughout session, limited insight into deficits   Strength RLE   RLE Overall Strength 3+/5  (grossly assessed w/ functional mobility)   Strength LLE   LLE Overall Strength 3+/5  (grossly assessed w/ functional mobility)   Bed Mobility   Supine to Sit Unable to assess   Sit to Supine Unable to assess   Additional Comments pt sitting at EOB w/ OT upon arrival, per OT pt required min Ax1 for supinr>EOB mobility to pt's L   Transfers   Stand to Sit 4  Minimal assistance   Additional items Assist x 1; Increased time required;Verbal cues   Additional Comments Pt performed stand-pivot transfer from EOB>recliner chair to pt's R side   Pt's caregiver Nicoleelyxiang Pineda assisted w/ PT/OT providing close supervision and chair positioning   Ambulation/Elevation   Gait pattern Not tested   Ambulation/Elevation Additional Comments ambulation not assessed due to pt's limited activity tolerance and decreased standing endurance   Balance   Static Sitting Fair   Dynamic Sitting Fair -   Static Standing Poor +   Dynamic Standing Poor + Endurance Deficit   Endurance Deficit Yes   Endurance Deficit Description decreased overall activity tolerance   Activity Tolerance   Activity Tolerance Patient limited by fatigue   Medical Staff Made Aware Care coordination w/ OT Lexee due to pt's medical complexity, regression from mobility baseline, and cognitive/safety awareness deficits requiring two skilled clinicians; individual items assessed and goals addressed   Assessment   Prognosis Fair   Problem List Decreased strength;Decreased endurance; Impaired balance;Decreased mobility; Decreased cognition; Impaired judgement;Decreased safety awareness; Obesity; Decreased skin integrity   Assessment Harley Mario is a 60 y o  Female who presents to [address] [place] [address] on 11/16/2022 due to fevers, chills, cough and diagnosis of pneumonia  Orders for PT eval and treat received, w/ activity orders of up and out of bed as tolerated  Comorbidities affecting pt at time of evaluation include: CKD, COPD, asthma, anxiety, DM, neuropathy, depression  Personal factors affecting DC include: ambulating w/ assistive device, inability to ambulate household distances, inability to navigate level surfaces w/o external assistance, decreased cognition, positive fall history, inability to perform IADLs and inability to perform ADLs  At baseline, pt mobilizes mod I w/ RW to/froom bathroom w/ pt most recently performing transfers to/from electric scooter and w/ 1 fall(s) in the previous 6 months  Upon evaluation, pt presents w/ the following deficits: weakness, altered sensation, impaired skin integrity, impaired balance, decreased endurance and limited overall mobility tolerance  Pt currently requires min Ax1 for transfers   Pt's clinical presentation is unstable/unpredictable due to need for assist w/ all phases of mobility when usually mobilizing independently, need for supplemental oxygen in order to maintain oxygen saturation, need for input for task focus and mobility technique, recent drastic decline in mobility compared to baseline and recent history of falls  Pt is at an increased risk of falls due to impaired balance, limited activity tolerance, decreased safety awareness  From a PT/mobility standpoint, given the above findings, DC recommendation is: Home w/ HHPT vs post acute rehab pending pt's progress w/ functional mobility and level of assistance available upon DC  During current admission, pt will benefit from continued skilled inpatient PT in the acute care setting in order to address the above deficits and to maximize function and mobility prior to DC from acute care  Goals   Patient Goals to go home   STG Expiration Date 11/26/22   Short Term Goal #1 Pt will: perform bilateral rolling bed mobility w/ mod I to decrease pt's burden of care; perform supine<>sit mobility w/ mod I to increase pt's independence w/ functional mobility; sit at EOB w/ supervision for at least 30 minutes to increase pt's tolerance to an upright sitting position and prepare for OOB transfers; perform transfers mod I to increase pt's OOB mobility; ambulate at least 25' w/ LRAD and supervision to increase pt's tolerance to functional mobility; increase balance ratings by at least 1 grade to decrease pt's risk of falls   PT Treatment Day 0   Plan   Treatment/Interventions Functional transfer training;LE strengthening/ROM; Therapeutic exercise; Endurance training;Equipment eval/education; Bed mobility;Gait training;Patient/family training;Cognitive reorientation   PT Frequency 3-5x/wk   Recommendation   PT Discharge Recommendation Home with home health rehabilitation  (vs STR pending pt progress and level of assistance available upon DC)   AM-PAC Basic Mobility Inpatient   Turning in Bed Without Bedrails 3   Lying on Back to Sitting on Edge of Flat Bed 3   Moving Bed to Chair 3   Standing Up From Chair 3   Walk in Room 2   Climb 3-5 Stairs 1   Basic Mobility Inpatient Raw Score 15   Basic Mobility Standardized Score 36 97   Highest Level Of Mobility   -[facility] Goal 4: Move to chair/commode   -HLM Achieved 4: Move to chair/commode   End of Consult   Patient Position at End of Consult Bedside chair;Bed/Chair alarm activated; All needs within reach  Deonna [place] remaining present in room)       The patient's AM-PAC Basic Mobility Inpatient Short Form Raw Score is 15  A Raw score of less than or equal to 16 suggests the patient may benefit from discharge to post-acute rehabilitation services  Please also refer to the recommendation of the Physical Therapist for safe discharge planning      Pt will benefit from skilled inpatient PT during this admission in order to facilitate progress towards goals and to maximize functional independence prior to [facility] rec: HHPT vs STR pending pt's progress w/ functional mobility and level of assistance available upon DC        Negin Elias, PT, DPT  11/16/22

## 2022-11-16 NOTE — H&P
[address] 1960, 60 y o  female MRN: [id number]  Unit/Bed#: -01 Encounter: [id number]  Primary Care Provider: Luiz Reyna MD   Date and time admitted to hospital: 11/16/2022  3:42 AM    * Pneumonia due to infectious organism  Assessment & Plan  Patient presented from home due to fevers chills and dry cough  Chest x-ray showing Cardiomegaly with small effusions and possible interstitial edema  CT chest showing  1  New right upper lobe consolidation concerning for pneumonia  2   Mild interlobular septal thickening and trace bilateral pleural effusions can represent mild pulmonary edema  3   Cardiomegaly  Respiratory protocol, incentive spirometry, Mucinex  Follow-up blood culture  Follow-up sputum culture  Follow-up strep and Legionella    Status post ceftriaxone in the ER, will continue    Cardiomegaly  Assessment & Plan  CT chest showing cardiomegaly and trace bilateral effusions  Last echo September 30, 2020 showed EF of 55-60    Will update echo    Will place on lasix 20 mg IV bid as she is not on diuretics at home        Leg wound, left, initial encounter  Assessment & Plan  Will place wound consult for evaluation    Paroxysmal atrial fibrillation (HCC)  Assessment & Plan  Continue metoprolol 50 mg b i d  Resume home Coumadin  Follow-up INR    Chronic respiratory failure with hypoxia (HCC)  Assessment & Plan  On 4L NC at baseline  At baseline    Diabetes mellitus type 2 in obese [facility])  Assessment & Plan  Lab Results   Component Value Date    HGBA1C 7 0 (H) 11/01/2022       No results for input(s): POCGLU in the last 72 hours  Blood Sugar Average: Last 72 hrs: Insulin sliding scale  Carb consistent diet  Start on Lantus 10 units q h s    Lispro 5 units with meals  Adjust accordingly    Anxiety  Assessment & Plan  Resume home duloxetine and ativan    COPD with asthma ([facility] )  Assessment & Plan  Continue home inhalers  Low suspicion for copd exacerbation    Chronic kidney disease, stage III (moderate) ([facility])  Assessment & Plan  Lab Results   Component Value Date    EGFR 28 11/16/2022    EGFR 30 (L) 11/01/2022    EGFR 35 (L) 08/11/2022    CREATININE 1 87 (H) 11/16/2022    CREATININE 1 84 (H) 11/01/2022    CREATININE 1 64 (H) 08/11/2022     CKD stage 3 with creatinine baseline around 1 7  Cr around baseline  Continue to monitor      VTE Pharmacologic Prophylaxis: VTE Score: 10 High Risk (Score >/= 5) - Pharmacological DVT Prophylaxis Ordered: warfarin (Coumadin)  Sequential Compression Devices Ordered  Code Status: Level 1 - Full Code   Discussion with family: Updated  (significant other) via phone  Anticipated Length of Stay: Patient will be admitted on an inpatient basis with an anticipated length of stay of greater than 2 midnights secondary to pneumonia  Total Time for Visit, including Counseling / Coordination of Care: 45 minutes Greater than 50% of this total time spent on direct patient counseling and coordination of care  Chief Complaint: Shortness of breath    History of Present Illness:  Faustino Duke is a 60 y o  female with a PMH of hypertension, hyperlipidemia, hypothyroidism, diabetes type 2, anxiety who presents with increased shortness of breath for last 24-48 hours with exertion  Patient states that she had chills that started last night with subjective fever  Patient states that she normally has chills when she has a urinary tract infection thus decided to come in for evaluation     Patient admits to having a dry cough that has been also going on for 2 days  In the ER patient was requiring 6 L nasal cannula  CT chest was done showing right upper lobe consolidation  Patient was given 500 cc bolus and placed on ceftriaxone  Review of Systems:  Review of Systems   Constitutional: Positive for chills and fever  Respiratory: Positive for cough and shortness of breath  Skin: Positive for wound     All other systems reviewed and are negative  Past Medical and Surgical History:   Past Medical History:   Diagnosis Date   • Anemia    • Arthritis    • Cancer of kidney ([facility] )    • Chronic kidney disease    • Diabetes mellitus ([facility] )    • Disease of thyroid gland    • HLD (hyperlipidemia)    • Hypertension    • Ovarian cancer ([facility] )    • Pneumonia        Past Surgical History:   Procedure Laterality Date   • CHOLECYSTECTOMY     • CT GUIDED PERC DRAINAGE CATHETER PLACEMENT  5/16/2016   • GALLBLADDER SURGERY  05/01/2004   • HYSTERECTOMY  06/01/2018   • NEPHRECTOMY Right 08/02/2018       Meds/Allergies:  Prior to Admission medications    Medication Sig Start Date End Date Taking?  Authorizing Provider   albuterol (2 5 mg/3 mL) 0 083 % nebulizer solution INHALE CONTENTS OF 1 VIAL ( 3 MILLILITERS ) IN NEBULIZER BY MOUTH AND INTO THE LUNGS EVERY 6 HOURS IF NEEDED FOR WHEEZING OR SHORTNESS OF BREATH 2/8/21  Yes Mayte Buitrago MD   albuterol (PROVENTIL HFA,VENTOLIN HFA) 90 mcg/act inhaler Inhale 2 puffs every 4 (four) hours as needed for wheezing 10/26/22 1/24/23 Yes Rayma Prader, MD   amLODIPine (NORVASC) 5 mg tablet Take 1 tablet (5 mg total) by mouth daily 10/26/22 1/24/23 Yes Rayma Prader, MD   atorvastatin (LIPITOR) 10 mg tablet Take 1 tablet (10 mg total) by mouth daily 10/26/22  Yes Rayma Prader, MD   collagenase (SANTYL) ointment Apply topically daily 10/26/22  Yes Rayma Prader, MD   DULoxetine (CYMBALTA) 30 mg delayed release capsule Take 1 capsule (30 mg total) by mouth 2 (two) times a day 10/26/22  Yes Rayma Prader, MD   famotidine (PEPCID) 20 mg tablet Take 1 tablet (20 mg total) by mouth daily 10/26/22  Yes Rayma Prader, MD   ferrous sulfate 325 (65 Fe) mg tablet Take 325 mg by mouth 2 (two) times a week   Yes Historical Provider, MD   LORazepam (ATIVAN) 0 5 mg tablet Take 1 tablet (0 5 mg total) by mouth daily at bedtime 7/27/22  Yes Myao Mckeon Anna Flores MD   metoprolol tartrate (LOPRESSOR) 50 mg tablet Take 1 tablet (50 mg total) by mouth 2 (two) times a day 10/26/22  Yes Ovi Ku MD   warfarin (COUMADIN) 3 mg tablet Take 1 tablet (3 mg total) by mouth daily 10/26/22  Yes Ovi Ku MD   Blood Glucose Monitoring Suppl (Embrace Pro Glucose Meter) SAVANNAH Use 2 (two) times a day Embrace glucometer, test twice daily 11/30/21   Carlos Almanza MD   cholecalciferol (VITAMIN D3) 400 units tablet Take 1 tablet (400 Units total) by mouth daily 10/26/22 1/24/23  Ovi Ku MD   Embrace Lancets Ultra Thin 30G MISC Use 2 (two) times a day 8/23/21   Carlos Almanza MD   glucose blood (Embrace Pro Glucose Test) test strip PLEASE RE-WRITE RX FOR EMBRACE PRO **EMBRACE REGUALR IS NOT AVAILABLE**  ---TEST TWICE DAILY-- 12/2/21   Carlos Almanza MD   glucose blood test strip Use 1 each daily Charyace test strips, test daily 11/1/21   Carlos Almanza MD   Insulin Pen Needle (Pen Needles) 32G X 4 MM MISC Use daily Embrace pen needles, test daily 8/16/21   Carlos Almanza MD   levothyroxine 100 mcg tablet take 1 tablet by mouth in THE EARLY MORNING 7/29/22   Ovi Ku MD   pregabalin (LYRICA) 50 mg capsule Take po 1 pill Qhs x 3 days then 1 po bid 10/26/22   Ovi Ku MD   vitamin B-12 (VITAMIN B-12) 500 mcg tablet Take 1 tablet (500 mcg total) by mouth daily 6/26/20   Carlos Almanza MD   ketoconazole (NIZORAL) 2 % cream Apply topically 2 (two) times a day  Patient not taking: Reported on 10/26/2022 7/27/22 11/16/22  Ovi Ku MD   pantoprazole (PROTONIX) 40 mg tablet Take 1 tablet (40 mg total) by mouth daily  Patient not taking: Reported on 4/25/2022 3/3/22 11/16/22  Jeronimo Alberto MD   triamcinolone (KENALOG) 0 1 % ointment Apply topically 2 (two) times a day  Patient not taking: Reported on 10/26/2022 7/27/22 11/16/22  Miguel Plata Mery Lester MD     I have reviewed home medications with patient personally  Allergies: No Known Allergies    Social History:  Marital Status: Single     Patient Pre-hospital Living Situation: Home  Patient Pre-hospital Level of Mobility: unable to be assessed at time of evaluation  Patient Pre-hospital Diet Restrictions: none  Substance Use History:   Social History     Substance and Sexual Activity   Alcohol Use Never    Comment: n/a     Social History     Tobacco Use   Smoking Status Former   • Packs/day: 3 00   • Years: 30 00   • Pack years: 90 00   • Types: Cigarettes   • Quit date: 10/22/2010   • Years since quittin 0   Smokeless Tobacco Former   • Quit date: 2011   Tobacco Comments    quit approx  9 years ago     Social History     Substance and Sexual Activity   Drug Use Yes   • Types: Marijuana    Comment: smokes with girlfriend when anxious       Family History:  Family History   Problem Relation Age of Onset   • No Known Problems Mother    • No Known Problems Father        Physical Exam:     Vitals:   Blood Pressure: (!) 102/49 (22)  Pulse: 65 (22)  Temperature: 99 4 °F (37 4 °C) (22)  Temp Source: Oral (22)  Respirations: 20 (22)  Height: 5' 4" (162 6 cm) (22 72)  Weight - Scale: (!) 154 kg (340 lb 9 8 oz) (22)  SpO2: 97 % (2212)    Physical Exam  Vitals reviewed  Constitutional:       Appearance: She is obese  HENT:      Head: Normocephalic and atraumatic  Right Ear: External ear normal       Left Ear: External ear normal       Nose: Nose normal       Mouth/Throat:      Mouth: Mucous membranes are moist       Pharynx: Oropharynx is clear  Eyes:      Extraocular Movements: Extraocular movements intact  Cardiovascular:      Rate and Rhythm: Normal rate and regular rhythm  Heart sounds: Normal heart sounds     Pulmonary:      Effort: Pulmonary effort is normal       Comments: Decreased breath sounds bilaterally  Abdominal:      General: Abdomen is flat  Palpations: Abdomen is soft  Tenderness: There is no abdominal tenderness  Musculoskeletal:      Cervical back: Normal range of motion  Right lower leg: Edema present  Left lower leg: Edema present  Skin:     General: Skin is warm and dry  Comments: Left lower extremity wound on the lateral aspect of tibia   Neurological:      Mental Status: She is alert  Mental status is at baseline  Psychiatric:         Mood and Affect: Mood normal          Behavior: Behavior normal           Additional Data:     Lab Results:  Results from last 7 days   Lab Units 11/16/22  0404   WBC Thousand/uL 9 83   HEMOGLOBIN g/dL 8 8*   HEMATOCRIT % 31 8*   PLATELETS Thousands/uL 292   NEUTROS PCT % 77*   LYMPHS PCT % 11*   MONOS PCT % 7   EOS PCT % 2     Results from last 7 days   Lab Units 11/16/22  0404   SODIUM mmol/L 136   POTASSIUM mmol/L 4 7   CHLORIDE mmol/L 99   CO2 mmol/L 34*   BUN mg/dL 25   CREATININE mg/dL 1 87*   ANION GAP mmol/L 3*   CALCIUM mg/dL 8 4   ALBUMIN g/dL 3 3*   TOTAL BILIRUBIN mg/dL 0 35   ALK PHOS U/L 121*   ALT U/L 5*   AST U/L 9*   GLUCOSE RANDOM mg/dL 205*                 Results from last 7 days   Lab Units 11/16/22  0404   LACTIC ACID mmol/L 0 8       Imaging: Reviewed radiology reports from this admission including: chest CT scan  CT chest wo contrast   Final Result by Jacquie Obando MD (02/89 [id number])      1  New right upper lobe consolidation concerning for pneumonia  2   Mild interlobular septal thickening and trace bilateral pleural effusions can represent mild pulmonary edema  3   Cardiomegaly  Workstation performed: [id number]         XR chest 1 view portable   Final Result by Maria Esther Ricardo MD ([id number])      Cardiomegaly with small effusions and possible interstitial edema  Limited by underpenetration                    Workstation performed: [id number]             EKG and Other Studies Reviewed on Admission:   · EKG: Ordered, pending  ** Please Note: This note has been constructed using a voice recognition system   **

## 2022-11-16 NOTE — ASSESSMENT & PLAN NOTE
Lab Results   Component Value Date    EGFR 28 11/16/2022    EGFR 30 (L) 11/01/2022    EGFR 35 (L) 08/11/2022    CREATININE 1 87 (H) 11/16/2022    CREATININE 1 84 (H) 11/01/2022    CREATININE 1 64 (H) 08/11/2022     CKD stage 3 with creatinine baseline around 1 7  Cr around baseline  Continue to monitor

## 2022-11-17 ENCOUNTER — TELEPHONE (OUTPATIENT)
Dept: FAMILY MEDICINE CLINIC | Facility: CLINIC | Age: 62
End: 2022-11-17

## 2022-11-17 ENCOUNTER — HOME HEALTH ADMISSION (OUTPATIENT)
Dept: HOME HEALTH SERVICES | Facility: HOME HEALTHCARE | Age: 62
End: 2022-11-17

## 2022-11-17 ENCOUNTER — TRANSITIONAL CARE MANAGEMENT (OUTPATIENT)
Dept: FAMILY MEDICINE CLINIC | Facility: CLINIC | Age: 62
End: 2022-11-17

## 2022-11-17 VITALS
HEART RATE: 87 BPM | RESPIRATION RATE: 18 BRPM | SYSTOLIC BLOOD PRESSURE: 128 MMHG | WEIGHT: 293 LBS | TEMPERATURE: 94.4 F | BODY MASS INDEX: 50.02 KG/M2 | HEIGHT: 64 IN | OXYGEN SATURATION: 97 % | DIASTOLIC BLOOD PRESSURE: 57 MMHG

## 2022-11-17 LAB
ALBUMIN SERPL BCP-MCNC: 3.3 G/DL (ref 3.5–5)
ANION GAP SERPL CALCULATED.3IONS-SCNC: 5 MMOL/L (ref 4–13)
BUN SERPL-MCNC: 25 MG/DL (ref 5–25)
CALCIUM ALBUM COR SERPL-MCNC: 9.4 MG/DL (ref 8.3–10.1)
CALCIUM SERPL-MCNC: 8.8 MG/DL (ref 8.4–10.2)
CHLORIDE SERPL-SCNC: 100 MMOL/L (ref 96–108)
CO2 SERPL-SCNC: 32 MMOL/L (ref 21–32)
CREAT SERPL-MCNC: 1.87 MG/DL (ref 0.6–1.3)
CREAT UR-MCNC: 98.8 MG/DL
ERYTHROCYTE [DISTWIDTH] IN BLOOD BY AUTOMATED COUNT: 16.1 % (ref 11.6–15.1)
GFR SERPL CREATININE-BSD FRML MDRD: 28 ML/MIN/1.73SQ M
GLUCOSE SERPL-MCNC: 127 MG/DL (ref 65–140)
GLUCOSE SERPL-MCNC: 142 MG/DL (ref 65–140)
GLUCOSE SERPL-MCNC: 144 MG/DL (ref 65–140)
HCT VFR BLD AUTO: 34.3 % (ref 34.8–46.1)
HGB BLD-MCNC: 9.2 G/DL (ref 11.5–15.4)
INR PPP: 1.93 (ref 0.84–1.19)
MAGNESIUM SERPL-MCNC: 2.1 MG/DL (ref 1.9–2.7)
MCH RBC QN AUTO: 28.6 PG (ref 26.8–34.3)
MCHC RBC AUTO-ENTMCNC: 26.8 G/DL (ref 31.4–37.4)
MCV RBC AUTO: 107 FL (ref 82–98)
MICROALBUMIN UR-MCNC: 133 MG/L (ref 0–20)
MICROALBUMIN/CREAT 24H UR: 135 MG/G CREATININE (ref 0–30)
PHOSPHATE SERPL-MCNC: 3.5 MG/DL (ref 2.3–4.1)
PLATELET # BLD AUTO: 284 THOUSANDS/UL (ref 149–390)
PMV BLD AUTO: 10.1 FL (ref 8.9–12.7)
POTASSIUM SERPL-SCNC: 5.1 MMOL/L (ref 3.5–5.3)
PROCALCITONIN SERPL-MCNC: 0.13 NG/ML
PROTHROMBIN TIME: 22.5 SECONDS (ref 11.6–14.5)
RBC # BLD AUTO: 3.22 MILLION/UL (ref 3.81–5.12)
S PNEUM AG UR QL: NEGATIVE
SODIUM 24H UR-SCNC: 54 MOL/L
SODIUM SERPL-SCNC: 137 MMOL/L (ref 135–147)
WBC # BLD AUTO: 7.02 THOUSAND/UL (ref 4.31–10.16)

## 2022-11-17 RX ORDER — CEFPODOXIME PROXETIL 200 MG/1
400 TABLET, FILM COATED ORAL 2 TIMES DAILY WITH MEALS
Status: DISCONTINUED | OUTPATIENT
Start: 2022-11-18 | End: 2022-11-17 | Stop reason: HOSPADM

## 2022-11-17 RX ORDER — CEFPODOXIME PROXETIL 200 MG/1
400 TABLET, FILM COATED ORAL 2 TIMES DAILY WITH MEALS
Qty: 12 TABLET | Refills: 0 | Status: ON HOLD | OUTPATIENT
Start: 2022-11-18 | End: 2022-12-03

## 2022-11-17 RX ADMIN — CYANOCOBALAMIN TAB 500 MCG 500 MCG: 500 TAB at 09:34

## 2022-11-17 RX ADMIN — SENNOSIDES 8.6 MG: 8.6 TABLET, FILM COATED ORAL at 09:35

## 2022-11-17 RX ADMIN — IPRATROPIUM BROMIDE 0.5 MG: 0.5 SOLUTION RESPIRATORY (INHALATION) at 07:24

## 2022-11-17 RX ADMIN — CEFTRIAXONE 1000 MG: 1 INJECTION, SOLUTION INTRAVENOUS at 09:36

## 2022-11-17 RX ADMIN — COLLAGENASE SANTYL: 250 OINTMENT TOPICAL at 10:23

## 2022-11-17 RX ADMIN — LEVALBUTEROL HYDROCHLORIDE 1.25 MG: 1.25 SOLUTION, CONCENTRATE RESPIRATORY (INHALATION) at 07:24

## 2022-11-17 RX ADMIN — CHOLECALCIFEROL TAB 10 MCG (400 UNIT) 400 UNITS: 10 TAB at 09:34

## 2022-11-17 RX ADMIN — FAMOTIDINE 20 MG: 20 TABLET ORAL at 09:35

## 2022-11-17 RX ADMIN — LEVOTHYROXINE SODIUM 100 MCG: 100 TABLET ORAL at 05:07

## 2022-11-17 RX ADMIN — METOPROLOL TARTRATE 50 MG: 50 TABLET, FILM COATED ORAL at 09:34

## 2022-11-17 RX ADMIN — ATORVASTATIN CALCIUM 10 MG: 10 TABLET, FILM COATED ORAL at 09:35

## 2022-11-17 RX ADMIN — GUAIFENESIN 600 MG: 600 TABLET, EXTENDED RELEASE ORAL at 09:35

## 2022-11-17 RX ADMIN — AMLODIPINE BESYLATE 5 MG: 5 TABLET ORAL at 09:34

## 2022-11-17 RX ADMIN — PREGABALIN 50 MG: 50 CAPSULE ORAL at 09:35

## 2022-11-17 RX ADMIN — DOCUSATE SODIUM 100 MG: 100 CAPSULE, LIQUID FILLED ORAL at 09:36

## 2022-11-17 NOTE — ED PROVIDER NOTES
History  Chief Complaint   Patient presents with   • Fever - 9 weeks to 74 years     Brought in by EMS, starting not feeling well 2 days ago  Pt c/o weakness, nausea, fever 100 4 with EMS  Patient presents to the emergency department from home with her caretaker stating that she had started to feel short of breath weak and dizzy  Patient states that she slid off her chair onto the floor to her bottom plan is still to still be a search for pain  He she states that she has been more short of breath and fatigue over the last 24 hours and that she has been using more oxygen than normal on her nasal cannula  She states that the last time she felt like this she had an infection  She denies any other associated systemic symptoms  Prior to Admission Medications   Prescriptions Last Dose Informant Patient Reported? Taking?    Blood Glucose Monitoring Suppl (EDUS Pro Glucose Meter) SAVANNAH   No No   Sig: Use 2 (two) times a day Embrace glucometer, test twice daily   Embrace Lancets Ultra Thin 30G MISC   No No   Sig: Use 2 (two) times a day   Insulin Pen Needle (Pen Needles) 32G X 4 MM MISC   No No   Sig: Use daily Embrace pen needles, test daily   LORazepam (ATIVAN) 0 5 mg tablet 11/15/2022  No Yes   Sig: Take 1 tablet (0 5 mg total) by mouth daily at bedtime   albuterol (2 5 mg/3 mL) 0 083 % nebulizer solution 11/15/2022  No Yes   Sig: INHALE CONTENTS OF 1 VIAL ( 3 MILLILITERS ) IN NEBULIZER BY MOUTH AND INTO THE LUNGS EVERY 6 HOURS IF NEEDED FOR WHEEZING OR SHORTNESS OF BREATH   albuterol (PROVENTIL HFA,VENTOLIN HFA) 90 mcg/act inhaler 11/15/2022  No Yes   Sig: Inhale 2 puffs every 4 (four) hours as needed for wheezing   amLODIPine (NORVASC) 5 mg tablet 11/15/2022  No Yes   Sig: Take 1 tablet (5 mg total) by mouth daily   atorvastatin (LIPITOR) 10 mg tablet 11/15/2022  No Yes   Sig: Take 1 tablet (10 mg total) by mouth daily   cholecalciferol (VITAMIN D3) 400 units tablet Unknown  No No   Sig: Take 1 tablet (400 Units total) by mouth daily   collagenase (SANTYL) ointment 11/15/2022  No Yes   Sig: Apply topically daily   famotidine (PEPCID) 20 mg tablet 11/15/2022  No Yes   Sig: Take 1 tablet (20 mg total) by mouth daily   ferrous sulfate 325 (65 Fe) mg tablet 11/15/2022  Yes Yes   Sig: Take 325 mg by mouth 2 (two) times a week   glucose blood (Embrace Pro Glucose Test) test strip   No No   Sig: PLEASE RE-WRITE RX FOR EMBRACE PRO **EMBRACE REGUALR IS NOT AVAILABLE**  ---TEST TWICE DAILY--   glucose blood test strip   No No   Sig: Use 1 each daily Embrace test strips, test daily   levothyroxine 100 mcg tablet   No No   Sig: take 1 tablet by mouth in THE EARLY MORNING   metoprolol tartrate (LOPRESSOR) 50 mg tablet 11/15/2022  No Yes   Sig: Take 1 tablet (50 mg total) by mouth 2 (two) times a day   pregabalin (LYRICA) 50 mg capsule Unknown  No No   Sig: Take po 1 pill Qhs x 3 days then 1 po bid   vitamin B-12 (VITAMIN B-12) 500 mcg tablet Unknown  No No   Sig: Take 1 tablet (500 mcg total) by mouth daily   warfarin (COUMADIN) 3 mg tablet 11/15/2022  No Yes   Sig: Take 1 tablet (3 mg total) by mouth daily      Facility-Administered Medications: None       Past Medical History:   Diagnosis Date   • Anemia    • Arthritis    • Cancer of kidney (HCC)    • Chronic kidney disease    • Diabetes mellitus (HCC)    • Disease of thyroid gland    • HLD (hyperlipidemia)    • Hypertension    • Ovarian cancer ([facility] [address] )    • Pneumonia        Past Surgical History:   Procedure Laterality Date   • CHOLECYSTECTOMY     • CT GUIDED PERC DRAINAGE CATHETER PLACEMENT  5/16/2016   • GALLBLADDER SURGERY  05/01/2004   • HYSTERECTOMY  06/01/2018   • NEPHRECTOMY Right 08/02/2018       Family History   Problem Relation Age of Onset   • No Known Problems Mother    • No Known Problems Father      I have reviewed and agree with the history as documented      E-Cigarette/Vaping   • E-Cigarette Use Never User      E-Cigarette/Vaping Substances   • Nicotine No • THC Yes    • CBD No    • Flavoring No      Social History     Tobacco Use   • Smoking status: Former     Packs/day: 3 00     Years: 30 00     Pack years: 90 00     Types: Cigarettes     Quit date: 10/22/2010     Years since quittin 0   • Smokeless tobacco: Former     Quit date: 2011   • Tobacco comments:     quit approx  9 years ago   Vaping Use   • Vaping Use: Never used   Substance Use Topics   • Alcohol use: Never     Comment: n/a   • Drug use: Yes     Types: Marijuana     Comment: smokes with girlfriend when anxious       Review of Systems   Constitutional: Negative for fever and unexpected weight change  Respiratory: Positive for chest tightness and shortness of breath  Negative for apnea, wheezing and stridor  Cardiovascular: Negative for chest pain and leg swelling  Gastrointestinal: Negative for blood in stool, nausea and vomiting  Genitourinary: Negative for flank pain  Musculoskeletal: Negative for back pain  Neurological: Negative for weakness  Psychiatric/Behavioral: Negative for decreased concentration  All other systems reviewed and are negative  Physical Exam  Physical Exam  Vitals and nursing note reviewed  Constitutional:       General: She is not in acute distress  Appearance: She is well-developed  She is obese  HENT:      Head: Normocephalic and atraumatic  Mouth/Throat:      Mouth: Mucous membranes are moist    Eyes:      Conjunctiva/sclera: Conjunctivae normal    Cardiovascular:      Rate and Rhythm: Normal rate and regular rhythm  Pulses: Normal pulses  Heart sounds: No murmur heard  Pulmonary:      Effort: Pulmonary effort is normal  No respiratory distress  Breath sounds: Rales present  Abdominal:      Palpations: Abdomen is soft  Tenderness: There is no abdominal tenderness  There is no right CVA tenderness, left CVA tenderness, guarding or rebound  Hernia: A hernia is present     Musculoskeletal:         General: No swelling  Cervical back: Neck supple  Skin:     General: Skin is warm and dry  Capillary Refill: Capillary refill takes less than 2 seconds  Neurological:      General: No focal deficit present  Mental Status: She is alert  Mental status is at baseline     Psychiatric:         Mood and Affect: Mood normal          Vital Signs  ED Triage Vitals   Temperature Pulse Respirations Blood Pressure SpO2   11/16/22 0347 11/16/22 0347 11/16/22 0347 11/16/22 0347 11/16/22 0347   98 2 °F (36 8 °C) 103 22 106/86 100 %      Temp Source Heart Rate Source Patient Position - Orthostatic VS BP Location FiO2 (%)   11/16/22 0347 11/16/22 0347 11/16/22 0347 11/16/22 0347 --   Oral Monitor Sitting Right arm       Pain Score       11/16/22 0900       7           Vitals:    11/16/22 1500 11/16/22 1713 11/16/22 1819 11/16/22 2310   BP: 107/59 110/73  92/58   Pulse: 84 91 77 76   Patient Position - Orthostatic VS: Lying Lying  Lying         Visual Acuity      ED Medications  Medications   amLODIPine (NORVASC) tablet 5 mg (5 mg Oral Given 11/16/22 1008)   atorvastatin (LIPITOR) tablet 10 mg (10 mg Oral Given 11/16/22 1008)   collagenase (SANTYL) ointment ( Topical Given 11/16/22 1046)   famotidine (PEPCID) tablet 20 mg (20 mg Oral Given 11/16/22 0959)   metoprolol tartrate (LOPRESSOR) tablet 50 mg (50 mg Oral Given 11/16/22 2001)   warfarin (COUMADIN) tablet 3 mg (3 mg Oral Given 11/16/22 1705)   albuterol (PROVENTIL HFA,VENTOLIN HFA) inhaler 2 puff (has no administration in time range)   levothyroxine tablet 100 mcg (100 mcg Oral Given 11/16/22 1008)   pregabalin (LYRICA) capsule 50 mg (50 mg Oral Given 11/16/22 2001)   LORazepam (ATIVAN) tablet 0 5 mg (0 5 mg Oral Given 11/16/22 2039)   cyanocobalamin (VITAMIN B-12) tablet 500 mcg (500 mcg Oral Given 11/16/22 1007)   cholecalciferol (VITAMIN D3) tablet 400 Units (400 Units Oral Given 11/16/22 1008)   acetaminophen (TYLENOL) tablet 650 mg (650 mg Oral Given 11/16/22 2001) docusate sodium (COLACE) capsule 100 mg (100 mg Oral Not Given 11/16/22 1706)   senna (SENOKOT) tablet 8 6 mg (8 6 mg Oral Not Given 11/16/22 1706)   guaiFENesin (MUCINEX) 12 hr tablet 600 mg (600 mg Oral Given 11/16/22 2001)   cefTRIAXone (ROCEPHIN) IVPB (premix in dextrose) 1,000 mg 50 mL (has no administration in time range)   furosemide (LASIX) injection 20 mg (has no administration in time range)   levalbuterol (XOPENEX) inhalation solution 1 25 mg (1 25 mg Nebulization Given 11/16/22 1948)   ipratropium (ATROVENT) 0 02 % inhalation solution 0 5 mg (0 5 mg Nebulization Given 11/16/22 1948)   sodium chloride 0 9 % bolus 500 mL (500 mL Intravenous New Bag 11/16/22 0513)   cefTRIAXone (ROCEPHIN) IVPB (premix in dextrose) 2,000 mg 50 mL (2,000 mg Intravenous New Bag 11/16/22 0900)       Diagnostic Studies  Results Reviewed     Procedure Component Value Units Date/Time    Osmolality, urine [[id number]]  (Normal) Collected: 11/16/22 0537    Lab Status: Final result Specimen: Urine, Other Updated: 11/16/22 2147     Osmolality, Ur 464 mmol/KG     Blood culture #1 [[id number]] Collected: 11/16/22 0404    Lab Status: Preliminary result Specimen: Blood from Arm, Left Updated: 11/16/22 1602     Blood Culture Received in Microbiology Lab  Culture in Progress  Blood culture #2 [[id number]] Collected: 11/16/22 0404    Lab Status: Preliminary result Specimen: Blood from Hand, Right Updated: 11/16/22 1602     Blood Culture Received in Microbiology Lab  Culture in Progress      Procalcitonin [[id number]]  (Normal) Collected: 11/16/22 0404    Lab Status: Final result Specimen: Blood from Arm, Left Updated: 11/16/22 1505     Procalcitonin 0 11 ng/ml     TSH, 3rd generation [[id number]]  (Normal) Collected: 11/16/22 0404    Lab Status: Final result Specimen: Blood from Arm, Left Updated: 11/16/22 1505     TSH 3RD GENERATON 2 730 uIU/mL     Narrative:      Patients undergoing fluorescein dye angiography may retain small amounts of fluorescein in the body for 48-72 hours post procedure  Samples containing fluorescein can produce falsely depressed TSH values  If the patient had this procedure,a specimen should be resubmitted post fluorescein clearance  HS Troponin I 4hr [[id number]]  (Normal) Collected: 11/16/22 1240    Lab Status: Final result Specimen: Blood from Arm, Left Updated: 11/16/22 1458     hs TnI 4hr 12 ng/L      Delta 4hr hsTnI -1 ng/L     HS Troponin I 2hr [[id number]]  (Normal) Collected: 11/16/22 1029    Lab Status: Final result Specimen: Blood from Arm, Right Updated: 11/16/22 1219     hs TnI 2hr 13 ng/L      Delta 2hr hsTnI 0 ng/L     Microalbumin / creatinine urine ratio [[id number]] Collected: 11/16/22 0537    Lab Status: In process Specimen: Urine, Other Updated: 11/16/22 1118    Sodium, urine, random [[id number]] Collected: 11/16/22 0537    Lab Status: In process Specimen: Urine, Other Updated: 11/16/22 1118    HS Troponin 0hr (reflex protocol) [[id number]]  (Normal) Collected: 11/16/22 0721    Lab Status: Final result Specimen: Blood from Arm, Left Updated: 11/16/22 0800     hs TnI 0hr 13 ng/L     B-Type Natriuretic Peptide(BNP) AN, CA, EA Campuses Only [[id number]]  (Abnormal) Collected: 11/16/22 0721    Lab Status: Final result Specimen: Blood from Arm, Left Updated: 11/16/22 0758      pg/mL     FLU/RSV/COVID - if FLU/RSV clinically relevant [[id number]]  (Normal) Collected: 11/16/22 0641    Lab Status: Final result Specimen: Nares from Nose Updated: 11/16/22 0727     SARS-CoV-2 Negative     INFLUENZA A PCR Negative     INFLUENZA B PCR Negative     RSV PCR Negative    Narrative:      FOR PEDIATRIC PATIENTS - copy/paste COVID Guidelines URL to browser: [email or web address]    SARS-CoV-2 assay is a Nucleic Acid Amplification assay intended for the  qualitative detection of nucleic acid from SARS-CoV-2 in nasopharyngeal  swabs   Results are for the presumptive identification of SARS-CoV-2 RNA  Positive results are indicative of infection with SARS-CoV-2, the virus  causing COVID-19, but do not rule out bacterial infection or co-infection  with other viruses  Laboratories within the [place] and its  territories are required to report all positive results to the appropriate  public health authorities  Negative results do not preclude SARS-CoV-2  infection and should not be used as the sole basis for treatment or other  patient management decisions  Negative results must be combined with  clinical observations, patient history, and epidemiological information  This test has not been FDA cleared or approved  This test has been authorized by FDA under an Emergency Use Authorization  (EUA)  This test is only authorized for the duration of time the  declaration that circumstances exist justifying the authorization of the  emergency use of an in vitro diagnostic tests for detection of SARS-CoV-2  virus and/or diagnosis of COVID-19 infection under section 564(b)(1) of  the Act, 21 U  S C  440SST-8(O)(9), unless the authorization is terminated  or revoked sooner  The test has been validated but independent review by FDA  and CLIA is pending  Test performed using [employer] Gene[employer]pert: This RT-PCR assay targets N2,  a region unique to SARS-CoV-2  A conserved region in the E-gene was chosen  for pan-Sarbecovirus detection which includes SARS-CoV-2  According to CMS-2020-01-R, this platform meets the definition of high-throughput technology      Urine Microscopic [[id number]]  (Normal) Collected: 11/16/22 0537    Lab Status: Final result Specimen: Urine, Other Updated: 11/16/22 0601     RBC, UA None Seen /hpf      WBC, UA None Seen /hpf      Epithelial Cells None Seen /hpf      Bacteria, UA Occasional /hpf     UA w Reflex to Microscopic w Reflex to Culture [[id number]]  (Abnormal) Collected: 11/16/22 0537    Lab Status: Final result Specimen: Urine, Other Updated: 11/16/22 0543     Color, UA Yellow     Clarity, UA Clear     Specific [place], UA 1 020     pH, UA 6 0     Leukocytes, UA Negative     Nitrite, UA Negative     Protein, UA Trace mg/dl      Glucose, UA Negative mg/dl      Ketones, UA Negative mg/dl      Urobilinogen, UA 0 2 E U /dl      Bilirubin, UA Negative     Occult Blood, UA Trace-lysed    Lactic acid [[id number]]  (Normal) Collected: 11/16/22 0404    Lab Status: Final result Specimen: Blood from Arm, Left Updated: 11/16/22 0430     LACTIC ACID 0 8 mmol/L     Narrative:      Result may be elevated if tourniquet was used during collection      Comprehensive metabolic panel [[id number]]  (Abnormal) Collected: 11/16/22 0404    Lab Status: Final result Specimen: Blood from Arm, Left Updated: 11/16/22 0430     Sodium 136 mmol/L      Potassium 4 7 mmol/L      Chloride 99 mmol/L      CO2 34 mmol/L      ANION GAP 3 mmol/L      BUN 25 mg/dL      Creatinine 1 87 mg/dL      Glucose 205 mg/dL      Calcium 8 4 mg/dL      Corrected Calcium 9 0 mg/dL      AST 9 U/L      ALT 5 U/L      Alkaline Phosphatase 121 U/L      Total Protein 7 6 g/dL      Albumin 3 3 g/dL      Total Bilirubin 0 35 mg/dL      eGFR 28 ml/min/1 73sq m     Narrative:      [place] guidelines for Chronic Kidney Disease (CKD):   •  Stage 1 with normal or high GFR (GFR > 90 mL/min/1 73 square meters)  •  Stage 2 Mild CKD (GFR = 60-89 mL/min/1 73 square meters)  •  Stage 3A Moderate CKD (GFR = 45-59 mL/min/1 73 square meters)  •  Stage 3B Moderate CKD (GFR = 30-44 mL/min/1 73 square meters)  •  Stage 4 Severe CKD (GFR = 15-29 mL/min/1 73 square meters)  •  Stage 5 End Stage CKD (GFR <15 mL/min/1 73 square meters)  Note: GFR calculation is accurate only with a steady state creatinine    CBC and differential [[id number]]  (Abnormal) Collected: 11/16/22 0404    Lab Status: Final result Specimen: Blood from Arm, Left Updated: 11/16/22 0411     WBC 9 83 Thousand/uL      RBC 2 92 Million/uL Hemoglobin 8 8 g/dL      Hematocrit 31 8 %       fL      MCH 30 1 pg      MCHC 27 7 g/dL      RDW 16 4 %      MPV 9 9 fL      Platelets 097 Thousands/uL      nRBC 1 /100 WBCs      Neutrophils Relative 77 %      Immat GRANS % 2 %      Lymphocytes Relative 11 %      Monocytes Relative 7 %      Eosinophils Relative 2 %      Basophils Relative 1 %      Neutrophils Absolute 7 63 Thousands/µL      Immature Grans Absolute 0 19 Thousand/uL      Lymphocytes Absolute 1 03 Thousands/µL      Monocytes Absolute 0 72 Thousand/µL      Eosinophils Absolute 0 20 Thousand/µL      Basophils Absolute 0 06 Thousands/µL                  CT chest wo contrast   Final Result by Fidel Flores MD (22/33 2311)      1  New right upper lobe consolidation concerning for pneumonia  2   Mild interlobular septal thickening and trace bilateral pleural effusions can represent mild pulmonary edema  3   Cardiomegaly  Workstation performed: [id number]         XR chest 1 view portable   Final Result by Andrey Jacobsen MD (03/55 5384)      Cardiomegaly with small effusions and possible interstitial edema  Limited by underpenetration  Workstation performed: [id number]                    Procedures  Procedures         ED Course                               SBIRT 22yo+    Flowsheet Row Most Recent Value   SBIRT (23 yo +)    In order to provide better care to our patients, we are screening all of our patients for alcohol and drug use  Would it be okay to ask you these screening questions?  Unable to answer at this time Filed at: 11/16/2022 0424                    MDM  Number of Diagnoses or Management Options     Amount and/or Complexity of Data Reviewed  Clinical lab tests: ordered and reviewed  Tests in the radiology section of CPT®: ordered and reviewed  Decide to obtain previous medical records or to obtain history from someone other than the patient: yes    Risk of Complications, Morbidity, and/or Mortality  Presenting problems: moderate  Diagnostic procedures: moderate  Management options: moderate  General comments: Patient is a 58-year-old morbidly obese female with multiple medical concerns not feeling well dizziness and shortness of breath  Patient is on wound to the head and her oxygen requirements increased from 4 L to 6 L patient is found to have patchy infiltrates versus pulmonary edema  She will be admitted to hospitalist service for further evaluation treatment  I did not start Lasix on her in the emergency department due to her kidney function being grossly abnormal     Case is endorsed to hospitalist service remission  Disposition  Final diagnoses:   Acute on chronic congestive heart failure, unspecified heart failure type ([facility] )   Anemia, unspecified type     Time reflects when diagnosis was documented in both MDM as applicable and the Disposition within this note     Time User Action Codes Description Comment    11/16/2022  7:01 AM Brielle Lopez [I50 [id number]] Acute on chronic congestive heart failure, unspecified heart failure type ([facility] )     11/16/2022  7:02 AM Brielle Lopez [D64 [id number]] Anemia, unspecified type       ED Disposition     ED Disposition   Admit    Condition   Stable    Date/Time   Wed Nov 16, 2022 0701    Comment   Case was discussed with NICKI and the patient's admission status was agreed to be Admission Status: inpatient status to the service of Dr Sherri Groves              Follow-up Information    None         Current Discharge Medication List      CONTINUE these medications which have NOT CHANGED    Details   albuterol (2 5 mg/3 mL) 0 083 % nebulizer solution INHALE CONTENTS OF 1 VIAL ( 3 MILLILITERS ) IN NEBULIZER BY MOUTH AND INTO THE LUNGS EVERY 6 HOURS IF NEEDED FOR WHEEZING OR SHORTNESS OF BREATH  Qty: 75 mL, Refills: 5    Associated Diagnoses: Chronic obstructive pulmonary disease, unspecified COPD type ([facility] )      albuterol (PROVENTIL HFA,VENTOLIN HFA) 90 mcg/act inhaler Inhale 2 puffs every 4 (four) hours as needed for wheezing  Qty: 8 5 g, Refills: 12    Comments: Substitution to a formulary equivalent within the same pharmaceutical class is authorized  Associated Diagnoses: COPD with asthma ([facility] )      amLODIPine (NORVASC) 5 mg tablet Take 1 tablet (5 mg total) by mouth daily  Qty: 90 tablet, Refills: 2    Associated Diagnoses: Hypertensive chronic kidney disease with stage 1 through stage 4 chronic kidney disease, or unspecified chronic kidney disease; Stage 3b chronic kidney disease ([facility] ); Secondary hyperparathyroidism of renal origin ([facility] ); Anemia due to stage 3b chronic kidney disease ([facility] ); Persistent proteinuria; Iron deficiency; Dyslipidemia; H/O right nephrectomy;  Morbid (severe) obesity due to excess calories ([facility])      atorvastatin (LIPITOR) 10 mg tablet Take 1 tablet (10 mg total) by mouth daily  Qty: 90 tablet, Refills: 3    Associated Diagnoses: Dyslipidemia      collagenase (SANTYL) ointment Apply topically daily  Qty: 15 g, Refills: 1    Associated Diagnoses: Leg wound, left, initial encounter      famotidine (PEPCID) 20 mg tablet Take 1 tablet (20 mg total) by mouth daily  Qty: 90 tablet, Refills: 3    Associated Diagnoses: Gastroesophageal reflux disease without esophagitis      ferrous sulfate 325 (65 Fe) mg tablet Take 325 mg by mouth 2 (two) times a week      LORazepam (ATIVAN) 0 5 mg tablet Take 1 tablet (0 5 mg total) by mouth daily at bedtime  Qty: 30 tablet, Refills: 5    Associated Diagnoses: Panic attack      metoprolol tartrate (LOPRESSOR) 50 mg tablet Take 1 tablet (50 mg total) by mouth 2 (two) times a day  Qty: 180 tablet, Refills: 3    Associated Diagnoses: Paroxysmal atrial fibrillation (HCC)      warfarin (COUMADIN) 3 mg tablet Take 1 tablet (3 mg total) by mouth daily  Qty: 90 tablet, Refills: 3    Associated Diagnoses: Longstanding persistent atrial fibrillation (HCC)      Blood Glucose Monitoring Suppl (Embrace Pro Glucose Meter) SAVANNAH Use 2 (two) times a day Embrace glucometer, test twice daily  Qty: 1 each, Refills: 0    Associated Diagnoses: Type 2 diabetes mellitus without complication, with long-term current use of insulin ([facility])      cholecalciferol (VITAMIN D3) 400 units tablet Take 1 tablet (400 Units total) by mouth daily  Qty: 90 tablet, Refills: 3    Associated Diagnoses: Stage 3b chronic kidney disease ([facility])      Embrace Lancets Ultra Thin 30G MISC Use 2 (two) times a day  Qty: 100 each, Refills: 5    Associated Diagnoses: Type 2 diabetes mellitus without complication, with long-term current use of insulin ([facility] [address] )      ! ! glucose blood (Embrace Pro Glucose Test) test strip PLEASE RE-WRITE RX FOR EMBRACE PRO **EMBRACE REGUALR IS NOT AVAILABLE**  ---TEST TWICE DAILY--  Qty: 100 strip, Refills: 5    Associated Diagnoses: Type 2 diabetes mellitus without complication, with long-term current use of insulin ([address] )      ! ! glucose blood test strip Use 1 each daily Embrace test strips, test daily  Qty: 50 strip, Refills: 5    Associated Diagnoses: Type 2 diabetes mellitus without complication, with long-term current use of insulin ([facility])      Insulin Pen Needle (Pen Needles) 32G X 4 MM MISC Use daily Embrace pen needles, test daily  Qty: 50 each, Refills: 5    Associated Diagnoses: Type 2 diabetes mellitus without complication, with long-term current use of insulin ([facility])      levothyroxine 100 mcg tablet take 1 tablet by mouth in THE EARLY MORNING  Qty: 90 tablet, Refills: 3    Associated Diagnoses: Other specified hypothyroidism      pregabalin (LYRICA) 50 mg capsule Take po 1 pill Qhs x 3 days then 1 po bid  Qty: 60 capsule, Refills: 3    Associated Diagnoses: Diabetic polyneuropathy associated with type 2 diabetes mellitus ([facility])      vitamin B-12 (VITAMIN B-12) 500 mcg tablet Take 1 tablet (500 mcg total) by mouth daily  Qty: 90 tablet, Refills: 3    Associated Diagnoses: CKD (chronic kidney disease) stage 3, GFR 30-59 ml/min ([facility])       ! ! - Potential duplicate medications found  Please discuss with provider  No discharge procedures on file      PDMP Review       Value Time User    PDMP Reviewed  Yes 10/26/2022  1:50 PM Alisha Weston MD          ED Provider  Electronically Signed by           Jefry Null MD  11/17/22 5843

## 2022-11-17 NOTE — DISCHARGE INSTR - AVS FIRST PAGE
You have received 2 days of IV antibiotics for your pneumonia  Your being discharged on cefpodoxime 400 mg twice daily for 3 more days starting tomorrow 11/18  Please follow-up with your primary care doctor regarding outpatient diabetes management    If symptoms worsen please return to the ER for evaluation

## 2022-11-17 NOTE — TELEPHONE ENCOUNTER
Konstantin Ayon called the  Patient is in the hospital and going home today   ----she would like to discuss the metformin with you?

## 2022-11-17 NOTE — ASSESSMENT & PLAN NOTE
CT chest showing cardiomegaly and trace bilateral effusions  Last echo September 30, 2020 showed EF of 55-60    Updated Echo: Technically difficult study  Left ventricular cavity size is normal  Wall thickness is mildly increased  There is concentric remodeling  Wall motion and left ventricular ejection fraction cannot be accurately assessed  Left ventricular systolic function appears normal in limited views      S/P Lasix 20 mg IV x 1 on 11/16

## 2022-11-17 NOTE — CASE MANAGEMENT
Case Management Assessment & Discharge Planning Note    Patient name Marquez Oconnell  Location /-54 MRN [id number]  : 1960 Date 2022       Current Admission Date: 2022  Current Admission Diagnosis:Pneumonia due to infectious organism   Patient Active Problem List    Diagnosis Date Noted   • Pneumonia due to infectious organism 2022   • Cardiomegaly 2022   • Diabetic polyneuropathy associated with type 2 diabetes mellitus ([facility] ) 10/26/2022   • Leg wound, left, initial encounter 10/26/2022   • Diabetic neuropathy ([facility] ) 10/26/2022   • Tinea 2022   • Chronic respiratory failure with hypoxia ([facility] ) 2022   • Diabetic nephropathy associated with type 2 diabetes mellitus ([facility] ) 2022   • Depression, recurrent ([facility] ) 2022   • Diabetes mellitus type 2 in obese ([facility] ) 2022   • Pre-ulcerative corn or callous 2022   • Supratherapeutic INR 2021   • Abdominal pain 2021   • Morbid (severe) obesity due to excess calories ([facility] ) 2021   • Secondary hyperparathyroidism of renal origin ([facility] ) 2021   • GERD (gastroesophageal reflux disease) 2020   • Anxiety 2020   • Chronic constipation 2020   • Severe sepsis ([facility] ) 2020   • Acute renal failure superimposed on chronic kidney disease ([facility] ) 2020   • Paroxysmal atrial fibrillation ([facility] ) 2020   • COPD with asthma ([facility] ) 2020   • Hypothyroid 2020   • Cellulitis 09/10/2020   • H/O right nephrectomy 09/10/2020   • SOB (shortness of breath) 2020   • Hyperparathyroidism ([address] ) 2020   • Dyslipidemia 2019   • Anemia due to stage 3b chronic kidney disease ([facility] ) 10/04/2018   • Chronic kidney disease, stage III (moderate) ([facility] ) 10/04/2018   • Hypertensive chronic kidney disease with stage 1 through stage 4 chronic kidney disease, or unspecified chronic kidney disease 10/04/2018   • Persistent proteinuria 10/04/2018   • Iron deficiency 10/04/2018      LOS (days): 1  Geometric Mean LOS (GMLOS) (days): 2 90  Days to GMLOS:1 7     OBJECTIVE:    Risk of Unplanned Readmission Score: 16 96         Current admission status: Inpatient       Preferred Pharmacy:   [id number] [place], [address] [facility],[address]  [address] [facility],Fourth [address]  Phone: [phone] Fax: [phone]    Primary Care Provider: Juma Gale MD    Primary Insurance: MEDICARE  Secondary Insurance: [address]:  Active Health Care Proxies    There are no active Health Care Proxies on file  Patient Information  Admitted from[de-identified] Home  Mental Status: Alert  During Assessment patient was accompanied by: Other-Comment (significant other)  Assessment information provided by[de-identified] Patient  Support Systems: Spouse/significant other  [place] of Residence: [address] [place] [address] do you live in?: [place] entry access options   Select all that apply : Ramp  Type of Current Residence: Apartment  Upon entering residence, is there a bedroom on the main floor (no further steps)?: Yes  Upon entering residence, is there a bathroom on the main floor (no further steps)?: Yes  In the last 12 months, was there a time when you were not able to pay the mortgage or rent on time?: No  In the last 12 months, was there a time when you did not have a steady place to sleep or slept in a shelter (including now)?: No  Living Arrangements: Lives w/ Spouse/significant other    Activities of Daily Living Prior to Admission  Functional Status: Independent  Completes ADLs independently?: No  Level of ADL dependence: Assistance  Ambulates independently?: Yes  Does patient use assisted devices?: Yes  Assisted Devices (DME) used: Electric wheelchair, Shower Chair, Bedside Commode, Hospital Bed  Does patient currently own DME?: Yes  What DME does the patient currently own?: Bedside Commode, Electric Wheelchair, [employer], Shower Chair  Does patient have a history of Outpatient Therapy (PT/OT)?: No  Does the patient have a history of Short-Term Rehab?: No  Does patient have a history of HHC?: Yes  Does patient currently have [address]?: No         Patient Information Continued  Within the past 12 months, you worried that your food would run out before you got the money to buy more : Never true  Within the past 12 months, the food you bought just didn't last and you didn't have money to get more : Never true         Means of Transportation  In the past 12 months, has lack of transportation kept you from medical appointments or from getting medications?: No  In the past 12 months, has lack of transportation kept you from meetings, work, or from getting things needed for daily living?: No        DISCHARGE DETAILS:    Discharge planning discussed with[de-identified] patient and pt's ANDREW Angeles  Freedom of Choice: Yes  Comments - Freedom of Choice: patient choice for [facility]  CM contacted family/caregiver?: Yes  Were Treatment Team discharge recommendations reviewed with patient/caregiver?: Yes  Did patient/caregiver verbalize understanding of patient care needs?: Yes  Were patient/caregiver advised of the risks associated with not following Treatment Team discharge recommendations?: Yes    Contacts  Patient Contacts: Elvira Crane  Relationship to Patient[de-identified] Family  Contact Method:  In Person  Reason/Outcome: Discharge Planning, Referral    Requested [address]         Is the patient interested in Jazmyne Nevarez at discharge?: Yes  [address] requested[de-identified] Nursing, Occupational Therapy, Physical [address] Name[de-identified] [address] [place] [address] Provider[de-identified] PCP  Home Health Services Needed[de-identified] Evaluate Functional Status and Safety, Gait/ADL Training, Strengthening/Theraputic Exercises to Improve Function, Wound/Ostomy Care  Homebound Criteria Met[de-identified] Requires the Assistance of Another Person for Safe Ambulation or to Leave the Home, Uses an Assist Device (i e  cane, walker, etc)  Supporting Clincal Findings[de-identified] Limited Endurance, Fatigues Easliy in [employer]    DME Referral Provided  Referral made for DME?: No    Other Referral/Resources/Interventions Provided:  Interventions: [facility]  Referral Comments: PT/OT rcmd [facility]  CM met with pt and pt's spouse at bedside re: dcp  Pt and pt's SO relayed no preference for [facility] - blanket referral sent via aidin  [facility] options discussed, pt's SO relayed choice for [facility]  AVS updated  CM remains available for any further CM d/c needs      Would you like to participate in our [address] service program?  : No - Declined    Treatment Team Recommendation: [facility]  Discharge Destination Plan[de-identified] Home with [place] at Discharge : Family

## 2022-11-17 NOTE — PROGRESS NOTES
-- Patient: Quang Perez  -- MRN: [id number]  -- Aidin Request ID: [id number]  -- Level of care reserved: [address] [place] [address]  -- Partner Reserved: [facility], [place], [address] [place] [address] [phone]  -- Clinical needs requested:  -- Geography searched: [address] [place] [address]  -- Start of Service:  -- Request sent: 11:28am EST on 11/17/2022 by Jacqueline Bob  -- Partner reserved: 3:06pm EST on 11/17/2022 by Jacqueline Bob  -- Choice list shared: 12:31pm EST on 11/17/2022 by Jacqueline Bob

## 2022-11-17 NOTE — DISCHARGE INSTR - OTHER ORDERS
Plan:   Skin care Plan:    1-Cleanse sacro-buttocks with soap and water  Apply Maxorb Ag+ silver alginate to mid-sacral wound then cover with Allevyn foam  Change every two days and as needed  2-Calazime to bilateral skin folds to breasts, inguinal areas, abdominal and medial thighs 3x/day and as needed  3-Turn/reposition q2h or when medically stable for pressure re-distribution on skin   4-Float heels on 2 pillows so heels not in contact with mattress or pillows to offload pressure  5-Moisturize skin daily with skin nourishing cream  6-Pressure redistribution cushion in chair when out of bed  7-Hydraguard to bilateral heels 2x/day and as needed

## 2022-11-17 NOTE — DISCHARGE SUMMARY
Blayne 44  Discharge- Azulva Sole 1960, 60 y o  female MRN: [id number]  Unit/Bed#: -01 Encounter: [id number]  Primary Care Provider: Ramos Fields MD   Date and time admitted to hospital: 11/16/2022  3:42 AM    * Pneumonia due to infectious organism  Assessment & Plan  Patient presented from home due to fevers chills and dry cough  Chest x-ray showing Cardiomegaly with small effusions and possible interstitial edema  CT chest showing  1  New right upper lobe consolidation concerning for pneumonia  2   Mild interlobular septal thickening and trace bilateral pleural effusions can represent mild pulmonary edema  3   Cardiomegaly  Respiratory protocol, incentive spirometry, Mucinex  Follow-up final blood culture  strep and Legionella negative    S/P 2 doses of ceftriaxone  Will d/c on cefpodoxime 400 mg bid for 3 more days given significant improvement in symptoms     Cardiomegaly  Assessment & Plan  CT chest showing cardiomegaly and trace bilateral effusions  Last echo September 30, 2020 showed EF of 55-60    Updated Echo: Technically difficult study  Left ventricular cavity size is normal  Wall thickness is mildly increased  There is concentric remodeling  Wall motion and left ventricular ejection fraction cannot be accurately assessed  Left ventricular systolic function appears normal in limited views  S/P Lasix 20 mg IV x 1 on 11/16      Leg wound, left, initial encounter  Assessment & Plan  Wound care consulted  Case management will set up home health nurses to check up on patient    Diabetes mellitus type 2 in obese [facility])  Assessment & Plan  Lab Results   Component Value Date    HGBA1C 7 0 (H) 11/01/2022       Recent Labs     11/16/22  1125 11/16/22  1557 11/16/22  2031 11/17/22  0719   POCGLU 113 150* 154* 127       Blood Sugar Average: Last 72 hrs:  (P) 136 Insulin sliding scale  Carb consistent diet    Patient refused insulin while inpatient   Patient and significant other state patient doesn't need to be on diabetic meds  Informed them that patient's A1C is 7 0 and patient should be treated  Currently refusing medications and they state they will follow up with their PCP for the diabetes    Paroxysmal atrial fibrillation (HCC)  Assessment & Plan  Continue metoprolol 50 mg b i d  Resume home Coumadin  INR today 1 93    Chronic respiratory failure with hypoxia ([facility])  Assessment & Plan  On 4L NC at baseline  At baseline    Anxiety  Assessment & Plan  Resume home ativan    COPD with asthma ([facility] )  Assessment & Plan  Continue home inhalers  Low suspicion for copd exacerbation    Chronic kidney disease, stage III (moderate) ([facility])  Assessment & Plan  Lab Results   Component Value Date    EGFR 28 11/17/2022    EGFR 28 11/16/2022    EGFR 30 (L) 11/01/2022    CREATININE 1 87 (H) 11/17/2022    CREATININE 1 87 (H) 11/16/2022    CREATININE 1 84 (H) 11/01/2022     CKD stage 3 with creatinine baseline around 1 7  Cr around baseline  Continue to monitor          Medical Problems     Resolved Problems  Date Reviewed: 11/17/2022   None       Discharging Physician / Practitioner: Ok Alberto DO  PCP: Kirsty Torres MD  Admission Date:   Admission Orders (From admission, onward)     Ordered        11/16/22 0703  INPATIENT ADMISSION  Once                      Discharge Date: 11/17/22    Consultations During Hospital Stay:  · Wound care nurse    Procedures Performed:   · none    Significant Findings / Test Results:   · RUL pneumonia  · Leg wounds  · A1C 7 0    Incidental Findings:   · See above   · I reviewed the above mentioned incidental findings with the patient and/or family and they expressed understanding  Test Results Pending at Discharge (will require follow up):    · Blood cultures     Outpatient Tests Requested:  · n/a    Complications:  none    Reason for Admission:  Shortness of breath secondary pneumonia    Hospital Course:   Gen Cat is a 60 y o  female patient who originally presented to the hospital on 11/16/2022 due to progressively worsening shortness of breath with associated chills tactile fever and dry cough  Chest x-ray on admission showed cardiomegaly with small effusions and possible interstitial edema  CT chest was obtained showing new right upper lobe consolidation concerning for pneumonia, mild interlobular septal thickening, trace bilateral pleural effusions, and cardiomegaly  Patient was placed on ceftriaxone which she received 2 days of  Patient is being discharged on cefpodoxime 400 mg b i d  For 3 more days to complete a 5 day course of antibiotics  Given cardiomegaly on CT chest an echo was obtained which was technically difficult due to body habitus but it appears that her left ventricular cavity size is normal with normal ejection fraction, and mild increased wall thickness  Patient was given Lasix 20 mg IV times once with good urine output  On evaluation today patient states that she is feeling well and her breathing is back to baseline  Will discharge home with outpatient PCP follow-up  Had a lengthy conversation regarding patient's A1c being elevated at 7  While hospitalized patient refused insulin  Also currently refusing oral medications for her diabetes on discharge  Patient and her significant other state that they will follow-up with patient's PCP regarding patient's diabetes  Please see above list of diagnoses and related plan for additional information  Condition at Discharge: fair    Discharge Day Visit / Exam:   Subjective:  Patient seen examined at bedside  No acute events overnight    Patient states that she feels well and would like to be discharged  Vitals: Blood Pressure: 128/57 (11/17/22 0742)  Pulse: 87 (11/17/22 0742)  Temperature: (!) 94 4 °F (34 7 °C) (11/17/22 0742)  Temp Source: Tympanic (11/17/22 0742)  Respirations: 18 (11/17/22 0742)  Height: 5' 4" (162 6 cm) (11/16/22 1405)  Weight - Scale: (!) 154 kg (340 lb 9 8 oz) (11/17/22 0500)  SpO2: 97 % (11/17/22 0742)  Exam:   Physical Exam  Vitals reviewed  Constitutional:       Appearance: She is obese  HENT:      Head: Normocephalic and atraumatic  Right Ear: External ear normal       Left Ear: External ear normal       Nose: Nose normal       Mouth/Throat:      Mouth: Mucous membranes are moist       Pharynx: Oropharynx is clear  Eyes:      Extraocular Movements: Extraocular movements intact  Cardiovascular:      Rate and Rhythm: Normal rate and regular rhythm  Heart sounds: Normal heart sounds  Pulmonary:      Effort: Pulmonary effort is normal       Breath sounds: Normal breath sounds  Abdominal:      General: Abdomen is flat  Palpations: Abdomen is soft  Tenderness: There is no abdominal tenderness  Musculoskeletal:      Cervical back: Normal range of motion  Right lower leg: No edema  Left lower leg: No edema  Skin:     Comments: Dressing left lower extremity is clean dry and intact   Neurological:      Mental Status: She is alert  Mental status is at baseline  Psychiatric:         Mood and Affect: Mood normal          Behavior: Behavior normal           Discussion with Family: Updated  (significant other) at bedside  Discharge instructions/Information to patient and family:   See after visit summary for information provided to patient and family  Provisions for Follow-Up Care:  See after visit summary for information related to follow-up care and any pertinent home health orders  Disposition:   Home with VNA Services (Reminder: Complete face to face encounter)    Planned Readmission: no     Discharge Statement:  I spent 45 minutes discharging the patient  This time was spent on the day of discharge  I had direct contact with the patient on the day of discharge   Greater than 50% of the total time was spent examining patient, answering all patient questions, arranging and discussing plan of care with patient as well as directly providing post-discharge instructions  Additional time then spent on discharge activities  Discharge Medications:  See after visit summary for reconciled discharge medications provided to patient and/or family        **Please Note: This note may have been constructed using a voice recognition system**

## 2022-11-17 NOTE — ASSESSMENT & PLAN NOTE
Patient presented from home due to fevers chills and dry cough  Chest x-ray showing Cardiomegaly with small effusions and possible interstitial edema  CT chest showing  1  New right upper lobe consolidation concerning for pneumonia  2   Mild interlobular septal thickening and trace bilateral pleural effusions can represent mild pulmonary edema  3   Cardiomegaly      Respiratory protocol, incentive spirometry, Mucinex  Follow-up final blood culture  strep and Legionella negative    S/P 2 doses of ceftriaxone  Will d/c on cefpodoxime 400 mg bid for 3 more days given significant improvement in symptoms

## 2022-11-17 NOTE — ASSESSMENT & PLAN NOTE
Lab Results   Component Value Date    EGFR 28 11/17/2022    EGFR 28 11/16/2022    EGFR 30 (L) 11/01/2022    CREATININE 1 87 (H) 11/17/2022    CREATININE 1 87 (H) 11/16/2022    CREATININE 1 84 (H) 11/01/2022     CKD stage 3 with creatinine baseline around 1 7  Cr around baseline  Continue to monitor

## 2022-11-17 NOTE — WOUND OSTOMY CARE
Progress Note - Wound   Flakito Boyd 60 y o  female MRN: [id number]  Unit/Bed#: -01 Encounter: [id number]      Assessment: This is a 60year old female patient admitted on 11/16/22 with pneumonia due to infectious organism  She has a history of DM 2, morbid obesity, HTN, and venous stasis disease  She was sleeping but was awake, alert & oriented x 3  Patient seen by this wound RN with Dr Myles Quiroz - she was told that she may be discharged today with VNA & plan of care was discussed  The patient is totally dependent upon nursing staff for all of her care  She had a Purewick in place but was incontinent of a large amount of urine  There is no record of BM yet  She was repositioned in bed with assist of 2 persons  Assessment Findings:  1-Partial thickness linear wound to mid-sacrum with pink granulation tissue and large amount of serosanguinous drainage  It measures 5 x 0 2 x 0 1 cm  This wound is due to moisture  Orders in place for wound care and for prevention  2-Full thickness wound cluster to left lower leg with 100% yellow slough & scant serosanguinous drainage  Orders were already in place for Santyl & dry sterile dressing  3-Moisture associated partial thickness skin damage to bilateral breast, abdominal & inguinal areas  Orders in place for wound and skin care  4-Full thickness wounds to bilateral medial thighs due to hidradenitis suppurativa  Orders in place for wound and skin care  Plan:   Skin care Plan:  1-Cleanse sacro-buttocks with soap and water  Apply Maxorb Ag+ silver alginate to mid-sacral wound then cover with Allevyn foam  Vinay with T for treatment  Change every two days and PRN  check skin q-shift  2-Calazime to bilateral skin folds to breasts, inguinal areas, abdominal and medial thighs TID and PRN  3-Turn/reposition q2h or when medically stable for pressure re-distribution on skin     4-Float heels on 2 pillows so heels not in contact with mattress or pillows to offload pressure  5-Moisturize skin daily with skin nourishing cream  6-Ehob cushion in chair when out of bed  7-Hydraguard to bilateral heels BID and PRN  L breast fold    R abdomen    L abdomen    L abdomen    L abd fold     L medial thigh    R medial thigh    Midsacrum wound partial thickness due to MASD        Wound Pretibial Left (Active)   Wound Image   11/17/22 0916   Wound Description Slough; Yellow 11/17/22 0916   Azalia-wound Assessment Hyperpigmented;Edema; Erythema 11/17/22 0916   Wound Length (cm) 7 5 cm 11/17/22 0916   Wound Width (cm) 5 cm 11/17/22 0916   Wound Depth (cm) 0 1 cm 11/17/22 0916   Wound Surface Area (cm^2) 37 5 cm^2 11/17/22 0916   Wound Volume (cm^3) 3 75 cm^3 11/17/22 0916   Calculated Wound Volume (cm^3) 3 75 cm^3 11/17/22 0916   Drainage Amount Scant 11/17/22 0916   Drainage Description Serosanguineous 11/17/22 0916   Non-staged Wound Description Full thickness 11/17/22 0916   Treatments Cleansed 11/17/22 0916   Dressing Enzymatic debrider;ABD;Dry dressing 11/17/22 0916   Wound packed? No 11/17/22 0916   Dressing Status Clean;Dry; Intact 11/17/22 0916       Discussed assessment findings, and plan of care/recommendations with Dr Marybeth Ballesteros LPN & with patient's caretaker Viviana Bagley  Patient is for discharge today  Recommendations written as orders    Alf Vogt RN, BSN, [employer]

## 2022-11-17 NOTE — ASSESSMENT & PLAN NOTE
Lab Results   Component Value Date    HGBA1C 7 0 (H) 11/01/2022       Recent Labs     11/16/22  1125 11/16/22  1557 11/16/22 2031 11/17/22  0719   POCGLU 113 150* 154* 127       Blood Sugar Average: Last 72 hrs:  (P) 136 Insulin sliding scale  Carb consistent diet    Patient refused insulin while inpatient  Patient and significant other state patient doesn't need to be on diabetic meds  Informed them that patient's A1C is 7 0 and patient should be treated   Currently refusing medications and they state they will follow up with their PCP for the diabetes

## 2022-11-18 ENCOUNTER — HOME CARE VISIT (OUTPATIENT)
Dept: HOME HEALTH SERVICES | Facility: HOME HEALTHCARE | Age: 62
End: 2022-11-18

## 2022-11-19 ENCOUNTER — HOME CARE VISIT (OUTPATIENT)
Dept: HOME HEALTH SERVICES | Facility: HOME HEALTHCARE | Age: 62
End: 2022-11-19

## 2022-11-20 ENCOUNTER — APPOINTMENT (EMERGENCY)
Dept: RADIOLOGY | Facility: HOSPITAL | Age: 62
End: 2022-11-20

## 2022-11-20 ENCOUNTER — APPOINTMENT (EMERGENCY)
Dept: CT IMAGING | Facility: HOSPITAL | Age: 62
End: 2022-11-20

## 2022-11-20 ENCOUNTER — HOSPITAL ENCOUNTER (INPATIENT)
Facility: HOSPITAL | Age: 62
LOS: 1 days | End: 2022-11-21
Attending: INTERNAL MEDICINE | Admitting: STUDENT IN AN ORGANIZED HEALTH CARE EDUCATION/TRAINING PROGRAM

## 2022-11-20 ENCOUNTER — HOME CARE VISIT (OUTPATIENT)
Dept: HOME HEALTH SERVICES | Facility: HOME HEALTHCARE | Age: 62
End: 2022-11-20

## 2022-11-20 VITALS — SYSTOLIC BLOOD PRESSURE: 118 MMHG | DIASTOLIC BLOOD PRESSURE: 80 MMHG | HEART RATE: 76 BPM | OXYGEN SATURATION: 95 %

## 2022-11-20 DIAGNOSIS — J90 PLEURAL EFFUSION ON LEFT: ICD-10-CM

## 2022-11-20 DIAGNOSIS — M79.3 ACUTE PANNICULITIS: Primary | ICD-10-CM

## 2022-11-20 DIAGNOSIS — E66.01 MORBID OBESITY (HCC): ICD-10-CM

## 2022-11-20 DIAGNOSIS — R53.1 WEAKNESS: ICD-10-CM

## 2022-11-20 DIAGNOSIS — R91.8 INFILTRATE OF UPPER LOBE OF RIGHT LUNG PRESENT ON IMAGING STUDY: ICD-10-CM

## 2022-11-20 PROBLEM — R41.82 ALTERED MENTAL STATUS, UNSPECIFIED: Status: ACTIVE | Noted: 2022-11-20

## 2022-11-20 PROBLEM — R93.89 ABNORMAL CT SCAN: Status: ACTIVE | Noted: 2022-11-20

## 2022-11-20 PROBLEM — R40.0 SOMNOLENCE: Status: ACTIVE | Noted: 2022-11-20

## 2022-11-20 PROBLEM — R40.0 SOMNOLENCE: Status: RESOLVED | Noted: 2022-11-20 | Resolved: 2022-11-20

## 2022-11-20 PROBLEM — E65 PANNICULUS: Status: ACTIVE | Noted: 2022-11-20

## 2022-11-20 LAB
ALBUMIN SERPL BCP-MCNC: 3.3 G/DL (ref 3.5–5)
ALP SERPL-CCNC: 139 U/L (ref 34–104)
ALT SERPL W P-5'-P-CCNC: 6 U/L (ref 7–52)
AMMONIA PLAS-SCNC: 66 UMOL/L (ref 18–72)
AMORPH URATE CRY URNS QL MICRO: ABNORMAL /HPF
ANION GAP SERPL CALCULATED.3IONS-SCNC: 1 MMOL/L (ref 4–13)
ANISOCYTOSIS BLD QL SMEAR: PRESENT
APTT PPP: 58 SECONDS (ref 23–37)
ARTERIAL PATENCY WRIST A: YES
AST SERPL W P-5'-P-CCNC: 11 U/L (ref 13–39)
BACTERIA BLD CULT: NORMAL
BACTERIA BLD CULT: NORMAL
BACTERIA UR QL AUTO: ABNORMAL /HPF
BASE EXCESS BLDA CALC-SCNC: 7.5 MMOL/L
BASOPHILS # BLD MANUAL: 0.15 THOUSAND/UL (ref 0–0.1)
BASOPHILS NFR MAR MANUAL: 2 % (ref 0–1)
BILIRUB SERPL-MCNC: 0.3 MG/DL (ref 0.2–1)
BILIRUB UR QL STRIP: NEGATIVE
BODY TEMPERATURE: 94.5 DEGREES FEHRENHEIT
BUN SERPL-MCNC: 20 MG/DL (ref 5–25)
CALCIUM ALBUM COR SERPL-MCNC: 9.7 MG/DL (ref 8.3–10.1)
CALCIUM SERPL-MCNC: 9.1 MG/DL (ref 8.4–10.2)
CARDIAC TROPONIN I PNL SERPL HS: 10 NG/L (ref 8–18)
CHLORIDE SERPL-SCNC: 101 MMOL/L (ref 96–108)
CLARITY UR: ABNORMAL
CO2 SERPL-SCNC: 37 MMOL/L (ref 21–32)
COARSE GRAN CASTS URNS QL MICRO: ABNORMAL /LPF
COLOR UR: YELLOW
CREAT SERPL-MCNC: 1.69 MG/DL (ref 0.6–1.3)
EOSINOPHIL # BLD MANUAL: 0.07 THOUSAND/UL (ref 0–0.4)
EOSINOPHIL NFR BLD MANUAL: 1 % (ref 0–6)
ERYTHROCYTE [DISTWIDTH] IN BLOOD BY AUTOMATED COUNT: 16.8 % (ref 11.6–15.1)
GFR SERPL CREATININE-BSD FRML MDRD: 31 ML/MIN/1.73SQ M
GLUCOSE SERPL-MCNC: 121 MG/DL (ref 65–140)
GLUCOSE SERPL-MCNC: 126 MG/DL (ref 65–140)
GLUCOSE UR STRIP-MCNC: NEGATIVE MG/DL
HCO3 BLDA-SCNC: 39.6 MMOL/L (ref 22–28)
HCT VFR BLD AUTO: 32.7 % (ref 34.8–46.1)
HGB BLD-MCNC: 9 G/DL (ref 11.5–15.4)
HGB UR QL STRIP.AUTO: ABNORMAL
INR PPP: 2.75 (ref 0.84–1.19)
KETONES UR STRIP-MCNC: NEGATIVE MG/DL
L PNEUMO1 AG UR QL IA.RAPID: NEGATIVE
LACTATE SERPL-SCNC: 0.5 MMOL/L (ref 0.5–2)
LEUKOCYTE ESTERASE UR QL STRIP: NEGATIVE
LYMPHOCYTES # BLD AUTO: 1.48 THOUSAND/UL (ref 0.6–4.47)
LYMPHOCYTES # BLD AUTO: 20 % (ref 14–44)
MACROCYTES BLD QL AUTO: PRESENT
MCH RBC QN AUTO: 30.4 PG (ref 26.8–34.3)
MCHC RBC AUTO-ENTMCNC: 27.5 G/DL (ref 31.4–37.4)
MCV RBC AUTO: 111 FL (ref 82–98)
MONOCYTES # BLD AUTO: 0.59 THOUSAND/UL (ref 0–1.22)
MONOCYTES NFR BLD: 8 % (ref 4–12)
NASAL CANNULA: 2.5
NEUTROPHILS # BLD MANUAL: 5.11 THOUSAND/UL (ref 1.85–7.62)
NEUTS BAND NFR BLD MANUAL: 4 % (ref 0–8)
NEUTS SEG NFR BLD AUTO: 65 % (ref 43–75)
NITRITE UR QL STRIP: NEGATIVE
NON-SQ EPI CELLS URNS QL MICRO: ABNORMAL /HPF
NRBC BLD AUTO-RTO: 2 /100 WBC (ref 0–2)
O2 CT BLDA-SCNC: 12.2 ML/DL (ref 16–23)
OXYHGB MFR BLDA: 93.1 % (ref 94–97)
PCO2 BLDA: 129.6 MM HG (ref 36–44)
PH BLDA: 7.1 [PH] (ref 7.35–7.45)
PH UR STRIP.AUTO: 5.5 [PH]
PLATELET # BLD AUTO: 269 THOUSANDS/UL (ref 149–390)
PLATELET BLD QL SMEAR: ADEQUATE
PMV BLD AUTO: 10.2 FL (ref 8.9–12.7)
PO2 BLDA: 83.1 MM HG (ref 75–129)
POIKILOCYTOSIS BLD QL SMEAR: PRESENT
POLYCHROMASIA BLD QL SMEAR: PRESENT
POTASSIUM SERPL-SCNC: 5.1 MMOL/L (ref 3.5–5.3)
PROCALCITONIN SERPL-MCNC: 0.18 NG/ML
PROT SERPL-MCNC: 7.5 G/DL (ref 6.4–8.4)
PROT UR STRIP-MCNC: ABNORMAL MG/DL
PROTHROMBIN TIME: 29.5 SECONDS (ref 11.6–14.5)
RBC # BLD AUTO: 2.96 MILLION/UL (ref 3.81–5.12)
RBC #/AREA URNS AUTO: ABNORMAL /HPF
RBC MORPH BLD: PRESENT
S PNEUM AG UR QL: NEGATIVE
SODIUM SERPL-SCNC: 139 MMOL/L (ref 135–147)
SP GR UR STRIP.AUTO: >=1.03 (ref 1–1.03)
SPECIMEN SOURCE: ABNORMAL
STOMATOCYTES BLD QL SMEAR: PRESENT
UROBILINOGEN UR QL STRIP.AUTO: 0.2 E.U./DL
WBC # BLD AUTO: 7.4 THOUSAND/UL (ref 4.31–10.16)
WBC #/AREA URNS AUTO: ABNORMAL /HPF

## 2022-11-20 RX ORDER — LORAZEPAM 0.5 MG/1
0.5 TABLET ORAL
Status: DISCONTINUED | OUTPATIENT
Start: 2022-11-20 | End: 2022-11-21

## 2022-11-20 RX ORDER — HYDROXYZINE HYDROCHLORIDE 25 MG/1
25 TABLET, FILM COATED ORAL EVERY 6 HOURS PRN
Status: DISCONTINUED | OUTPATIENT
Start: 2022-11-20 | End: 2022-11-21 | Stop reason: HOSPADM

## 2022-11-20 RX ORDER — ACETAMINOPHEN 325 MG/1
650 TABLET ORAL EVERY 6 HOURS PRN
Status: DISCONTINUED | OUTPATIENT
Start: 2022-11-20 | End: 2022-11-21 | Stop reason: HOSPADM

## 2022-11-20 RX ORDER — ATORVASTATIN CALCIUM 10 MG/1
10 TABLET, FILM COATED ORAL DAILY
Status: DISCONTINUED | OUTPATIENT
Start: 2022-11-21 | End: 2022-11-21 | Stop reason: HOSPADM

## 2022-11-20 RX ORDER — LEVALBUTEROL 1.25 MG/.5ML
1.25 SOLUTION, CONCENTRATE RESPIRATORY (INHALATION)
Status: DISCONTINUED | OUTPATIENT
Start: 2022-11-20 | End: 2022-11-21 | Stop reason: HOSPADM

## 2022-11-20 RX ORDER — FAMOTIDINE 20 MG/1
20 TABLET, FILM COATED ORAL DAILY
Status: DISCONTINUED | OUTPATIENT
Start: 2022-11-21 | End: 2022-11-21 | Stop reason: HOSPADM

## 2022-11-20 RX ORDER — AMLODIPINE BESYLATE 5 MG/1
5 TABLET ORAL DAILY
Status: DISCONTINUED | OUTPATIENT
Start: 2022-11-21 | End: 2022-11-21 | Stop reason: HOSPADM

## 2022-11-20 RX ORDER — FUROSEMIDE 10 MG/ML
40 INJECTION INTRAMUSCULAR; INTRAVENOUS 2 TIMES DAILY
Status: DISCONTINUED | OUTPATIENT
Start: 2022-11-20 | End: 2022-11-21 | Stop reason: HOSPADM

## 2022-11-20 RX ORDER — WARFARIN SODIUM 3 MG/1
3 TABLET ORAL DAILY
Status: DISCONTINUED | OUTPATIENT
Start: 2022-11-21 | End: 2022-11-21 | Stop reason: HOSPADM

## 2022-11-20 RX ORDER — CEFTRIAXONE 2 G/50ML
2000 INJECTION, SOLUTION INTRAVENOUS EVERY 24 HOURS
Status: DISCONTINUED | OUTPATIENT
Start: 2022-11-20 | End: 2022-11-21 | Stop reason: HOSPADM

## 2022-11-20 RX ORDER — METOPROLOL TARTRATE 50 MG/1
50 TABLET, FILM COATED ORAL 2 TIMES DAILY
Status: DISCONTINUED | OUTPATIENT
Start: 2022-11-20 | End: 2022-11-21 | Stop reason: HOSPADM

## 2022-11-20 RX ORDER — GUAIFENESIN 600 MG/1
1200 TABLET, EXTENDED RELEASE ORAL 2 TIMES DAILY
Status: DISCONTINUED | OUTPATIENT
Start: 2022-11-20 | End: 2022-11-21 | Stop reason: HOSPADM

## 2022-11-20 RX ORDER — SODIUM CHLORIDE 9 MG/ML
125 INJECTION, SOLUTION INTRAVENOUS CONTINUOUS
Status: DISCONTINUED | OUTPATIENT
Start: 2022-11-20 | End: 2022-11-20

## 2022-11-20 RX ORDER — LEVOTHYROXINE SODIUM 0.1 MG/1
100 TABLET ORAL
Status: DISCONTINUED | OUTPATIENT
Start: 2022-11-21 | End: 2022-11-21 | Stop reason: HOSPADM

## 2022-11-20 RX ADMIN — GUAIFENESIN 1200 MG: 600 TABLET, EXTENDED RELEASE ORAL at 18:39

## 2022-11-20 RX ADMIN — METOPROLOL TARTRATE 50 MG: 50 TABLET, FILM COATED ORAL at 21:12

## 2022-11-20 RX ADMIN — LEVALBUTEROL HYDROCHLORIDE 1.25 MG: 1.25 SOLUTION, CONCENTRATE RESPIRATORY (INHALATION) at 20:57

## 2022-11-20 RX ADMIN — SODIUM CHLORIDE 125 ML/HR: 0.9 INJECTION, SOLUTION INTRAVENOUS at 14:31

## 2022-11-20 RX ADMIN — IPRATROPIUM BROMIDE 0.5 MG: 0.5 SOLUTION RESPIRATORY (INHALATION) at 20:57

## 2022-11-20 RX ADMIN — FUROSEMIDE 40 MG: 10 INJECTION, SOLUTION INTRAMUSCULAR; INTRAVENOUS at 18:39

## 2022-11-20 RX ADMIN — CEFTRIAXONE 2000 MG: 2 INJECTION, SOLUTION INTRAVENOUS at 18:39

## 2022-11-20 RX ADMIN — LORAZEPAM 0.5 MG: 0.5 TABLET ORAL at 21:11

## 2022-11-20 RX ADMIN — SODIUM CHLORIDE 125 ML/HR: 0.9 INJECTION, SOLUTION INTRAVENOUS at 18:41

## 2022-11-20 NOTE — ASSESSMENT & PLAN NOTE
The patient's significant other admits that patient has been having increased lethargy/confusion/generalized weakness since last evening when she slid down off the chair and had to call EMS to help patient get back to bed  Patient has had increased confusion and lethargy since the evening prior to presentation    CT head - No acute intracranial abnormality  CBC and CMP essentially unchanged from day of discharge 11/17/22  TSH last admission wnl  Follow-up ammonia level  Follow-up ABG  ?   Infection as the cause of change in mentation  Patient has not been compliant with cpap at home

## 2022-11-20 NOTE — ED NOTES
Pt observed awake and alert following commands, calm resting in bed, family at the bedside     Denis Del Rosario, [facility]  11/20/22 7648

## 2022-11-20 NOTE — ASSESSMENT & PLAN NOTE
CTA chest abd pelvis showing:    Multifocal scattered patchy nodular opacities in right upper and possibly right lower lobes is degraded by motion artifact, suspicious for infection/inflammation or aspiration  Difficult to exclude underlying pulmonary nodules  Recommend follow-up   chest CT in 8-12 weeks to ensure resolution       Small bilateral pleural effusions with multifocal subsegmental atelectasis      Acute panniculitis      Large right lower quadrant ventral abdominal hernia containing small bowel loops, proximal ascending colon, appendix, and mesenteric fat    No bowel obstruction      Small volume ascites in left hemiabdomen      Additional chronic/incidental findings as detailed above

## 2022-11-20 NOTE — ASSESSMENT & PLAN NOTE
Lab Results   Component Value Date    EGFR 31 11/20/2022    EGFR 28 11/17/2022    EGFR 28 11/16/2022    CREATININE 1 69 (H) 11/20/2022    CREATININE 1 87 (H) 11/17/2022    CREATININE 1 87 (H) 11/16/2022     Per Dr Terrance Yanez last note in August 2022:  new baseline appears to be from 1 8-as high as 2 4  Continue to monitor Cr

## 2022-11-20 NOTE — ASSESSMENT & PLAN NOTE
CT showing:   Multifocal scattered patchy nodular opacities in right upper and possibly right lower lobes is degraded by motion artifact, suspicious for infection/inflammation or aspiration  Difficult to exclude underlying pulmonary nodules  Recommend follow-up   chest CT in 8-12 weeks to ensure resolution      Speech consult for swallow eval  Mucinex, incentive spirometry, breathing treatments, sputum cultures ordered    Continue ceftriaxone day 1

## 2022-11-20 NOTE — CASE COMMUNICATION
[facility] has admitted your patient to [address] [place] [address] service with the following disciplines:      SN, PT and OT  This report is informational only, no responses is needed  Primary focus of home health care- Respiratory   Patient stated goals of care- Improve breathing and mobility   Anticipated visit pattern and next visit date- 11/22 1w1 2w6  See medication list - meds in home differ from AVS- all medications in home  Pt has been t aking dextromethorphan cough suppressant and guaifenesin at night for cough  Significant clinical findings- Wound care instructions differ from what caregiver was told  Paper work states to use alginate to sacrum vs LLE  Pt was also told to use Santyl on LLE wounds but it  is not listed  Potential barriers to goal achievement- limited mobility and O2 dependent   Other pertinent information    Thank you for allowing us to partic ipate in the care of your patient

## 2022-11-20 NOTE — ED PROVIDER NOTES
History  Chief Complaint   Patient presents with   • Weakness - Generalized     Brought in by EMS from home, increased weakness and lethargy today, recently started treatment for UTI, uses 2-3L via NC chronically     This is 62y came back as she was just discharged on 11/16/22, came by EMS for increased weakness   Pt is moaning at er , but can not figure out of what   Pt has temp 97 8 F on arrival   Pt is morbidily obese and hard to move on the bed  Pt girlfriend came and stated she slide from her chair yesterday and fell on the floor  Girlfriend called fire department to pick her from floor  Pt admitted for pneumonia and UTI , and she was on ceftriaxone on admission and discharged of cefpodoxime   No other h/o received from girlfriend  History provided by:  EMS personnel and friend   used: No        Prior to Admission Medications   Prescriptions Last Dose Informant Patient Reported? Taking?    Blood Glucose Monitoring Suppl ([employer] Pro Glucose Meter) SAVANNAH 11/19/2022  No Yes   Sig: Use 2 (two) times a day [employer] glucometer, test twice daily   [employer] Lancets Ultra Thin 30G MISC 11/19/2022  No Yes   Sig: Use 2 (two) times a day   LORazepam (ATIVAN) 0 5 mg tablet 11/19/2022  No Yes   Sig: Take 1 tablet (0 5 mg total) by mouth daily at bedtime   albuterol (2 5 mg/3 mL) 0 083 % nebulizer solution 11/20/2022  No Yes   Sig: INHALE CONTENTS OF 1 VIAL ( 3 MILLILITERS ) IN NEBULIZER BY MOUTH AND INTO THE LUNGS EVERY 6 HOURS IF NEEDED FOR WHEEZING OR SHORTNESS OF BREATH   albuterol (PROVENTIL HFA,VENTOLIN HFA) 90 mcg/act inhaler 11/20/2022  No Yes   Sig: Inhale 2 puffs every 4 (four) hours as needed for wheezing   amLODIPine (NORVASC) 5 mg tablet 11/20/2022  No Yes   Sig: Take 1 tablet (5 mg total) by mouth daily   atorvastatin (LIPITOR) 10 mg tablet 11/19/2022  No Yes   Sig: Take 1 tablet (10 mg total) by mouth daily   cefpodoxime (VANTIN) 200 mg tablet Unknown  No No   Sig: Take 2 tablets (400 mg total) by mouth 2 (two) times a day with meals for 6 doses Do not start before 2022  collagenase (SANTYL) ointment Unknown  No No   Sig: Apply topically daily   famotidine (PEPCID) 20 mg tablet 2022  No Yes   Sig: Take 1 tablet (20 mg total) by mouth daily   ferrous sulfate 325 (65 Fe) mg tablet 2022  Yes Yes   Sig: Take 325 mg by mouth 2 (two) times a week   levothyroxine 100 mcg tablet 2022  No Yes   Sig: take 1 tablet by mouth in THE EARLY MORNING   metoprolol tartrate (LOPRESSOR) 50 mg tablet 2022  No Yes   Sig: Take 1 tablet (50 mg total) by mouth 2 (two) times a day   warfarin (COUMADIN) 3 mg tablet 2022  No Yes   Sig: Take 1 tablet (3 mg total) by mouth daily      Facility-Administered Medications: None       Past Medical History:   Diagnosis Date   • Anemia    • Arthritis    • Cancer of kidney (HCC)    • Chronic kidney disease    • Diabetes mellitus (HCC)    • Disease of thyroid gland    • HLD (hyperlipidemia)    • Hypertension    • Ovarian cancer ([facility] [address] )    • Pneumonia        Past Surgical History:   Procedure Laterality Date   • CHOLECYSTECTOMY     • CT GUIDED PERC DRAINAGE CATHETER PLACEMENT  2016   • GALLBLADDER SURGERY  2004   • HYSTERECTOMY  2018   • NEPHRECTOMY Right 2018       Family History   Problem Relation Age of Onset   • No Known Problems Mother    • No Known Problems Father      I have reviewed and agree with the history as documented  E-Cigarette/Vaping   • E-Cigarette Use Never User      E-Cigarette/Vaping Substances   • Nicotine No    • THC Yes    • CBD No    • Flavoring No      Social History     Tobacco Use   • Smoking status: Former     Packs/day: 3 00     Years: 30 00     Pack years: 90 00     Types: Cigarettes     Quit date: 10/22/2010     Years since quittin 0   • Smokeless tobacco: Former     Quit date: 2011   • Tobacco comments:     quit approx   9 years ago   Vaping Use   • Vaping Use: Never used Substance Use Topics   • Alcohol use: Never     Comment: n/a   • Drug use: Yes     Types: Marijuana     Comment: smokes with girlfriend when anxious       Review of Systems   Unable to perform ROS: Other   pt does not give any valuable information  Physical Exam  Physical Exam  Vitals and nursing note reviewed  Constitutional:       General: She is not in acute distress  Appearance: She is well-developed and well-nourished  She is obese  She is ill-appearing  She is not toxic-appearing or diaphoretic  HENT:      Head: Normocephalic and atraumatic  Right Ear: Tympanic membrane and ear canal normal       Left Ear: Tympanic membrane and ear canal normal       Nose: Nose normal  No congestion or rhinorrhea  Mouth/Throat:      Mouth: Oropharynx is clear and moist  Mucous membranes are moist       Pharynx: No oropharyngeal exudate or posterior oropharyngeal erythema  Eyes:      Extraocular Movements: Extraocular movements intact  Pupils: Pupils are equal, round, and reactive to light  Neck:      Vascular: No carotid bruit  Cardiovascular:      Rate and Rhythm: Normal rate  Rhythm irregular  Pulses: Intact distal pulses  Heart sounds: Normal heart sounds  No murmur heard  No friction rub  No gallop  Pulmonary:      Effort: Pulmonary effort is normal  No respiratory distress  Breath sounds: No stridor  Rhonchi present  No wheezing or rales  Chest:      Chest wall: No tenderness  Abdominal:      General: Bowel sounds are normal  There is distension  Palpations: Abdomen is soft  There is no mass  Tenderness: There is no abdominal tenderness  There is no right CVA tenderness, left CVA tenderness, guarding or rebound  Hernia: No hernia is present  Comments: Very large ventral hernia   Musculoskeletal:         General: No swelling, tenderness, deformity, signs of injury or edema  Normal range of motion        Cervical back: Normal range of motion and neck supple  No rigidity or tenderness  Right lower leg: No edema  Left lower leg: No edema  Lymphadenopathy:      Cervical: No cervical adenopathy  Skin:     General: Skin is warm and dry  Capillary Refill: Capillary refill takes less than 2 seconds  Coloration: Skin is not jaundiced or pale  Findings: No bruising, erythema, lesion or rash  Comments: Has superfacial ulcer L leg   Neurological:      Mental Status: She is alert and oriented to person, place, and time     Psychiatric:         Mood and Affect: Mood and affect normal          Behavior: Behavior normal          Vital Signs  ED Triage Vitals   Temperature Pulse Respirations Blood Pressure SpO2   11/20/22 1406 11/20/22 1406 11/20/22 1406 11/20/22 1406 11/20/22 1406   97 5 °F (36 4 °C) 76 20 130/91 96 %      Temp Source Heart Rate Source Patient Position - Orthostatic VS BP Location FiO2 (%)   11/20/22 1446 11/20/22 1406 11/20/22 1448 11/20/22 1535 --   Rectal Monitor Lying Right arm       Pain Score       11/20/22 2000       No Pain           Vitals:    11/21/22 0145 11/21/22 0316 11/21/22 0512 11/21/22 0704   BP: 101/54 103/55 105/64 111/78   Pulse: 71 103 96 94   Patient Position - Orthostatic VS:  Lying Lying Lying         Visual Acuity      ED Medications  Medications   amLODIPine (NORVASC) tablet 5 mg (has no administration in time range)   atorvastatin (LIPITOR) tablet 10 mg (has no administration in time range)   collagenase (SANTYL) ointment (has no administration in time range)   famotidine (PEPCID) tablet 20 mg (has no administration in time range)   metoprolol tartrate (LOPRESSOR) tablet 50 mg (50 mg Oral Given 11/20/22 2112)   warfarin (COUMADIN) tablet 3 mg (has no administration in time range)   levothyroxine tablet 100 mcg (100 mcg Oral Given 11/21/22 0537)   acetaminophen (TYLENOL) tablet 650 mg (has no administration in time range)   furosemide (LASIX) injection 40 mg (40 mg Intravenous Given 11/20/22 1839) guaiFENesin (MUCINEX) 12 hr tablet 1,200 mg (1,200 mg Oral Given 11/20/22 1839)   cefTRIAXone (ROCEPHIN) IVPB (premix in dextrose) 2,000 mg 50 mL (2,000 mg Intravenous New Bag 11/20/22 1839)   LORazepam (ATIVAN) tablet 0 5 mg (0 5 mg Oral Given 11/20/22 2111)   hydrOXYzine HCL (ATARAX) tablet 25 mg (25 mg Oral Given 11/21/22 0537)   levalbuterol (XOPENEX) inhalation solution 1 25 mg (1 25 mg Nebulization Given 11/20/22 2057)   ipratropium (ATROVENT) 0 02 % inhalation solution 0 5 mg (0 5 mg Nebulization Given 11/20/22 2057)   LORazepam (ATIVAN) 2 mg/mL injection **ADS Override Pull** (has no administration in time range)   LORazepam (ATIVAN) injection 0 5 mg (0 5 mg Intravenous Given 11/21/22 0654)       Diagnostic Studies  Results Reviewed     Procedure Component Value Units Date/Time    Blood culture #1 [[id number]] Collected: 11/20/22 1441    Lab Status: Preliminary result Specimen: Blood from Arm, Right Updated: 11/21/22 0001     Blood Culture Received in Microbiology Lab  Culture in Progress  Blood culture #2 [[id number]] Collected: 11/20/22 1432    Lab Status: Preliminary result Specimen: Blood from Arm, Left Updated: 11/21/22 0001     Blood Culture Received in Microbiology Lab  Culture in Progress      Legionella antigen, Urine [[id number]]  (Normal) Collected: 11/20/22 1528    Lab Status: Final result Specimen: Urine, Clean Catch Updated: 11/20/22 2339     Legionella Urinary Antigen Negative    Strep Pneumoniae, Urine [[id number]]  (Normal) Collected: 11/20/22 1528    Lab Status: Final result Specimen: Urine, Clean Catch Updated: 11/20/22 2325     Strep pneumoniae antigen, urine Negative    Urine Microscopic [[id number]]  (Abnormal) Collected: 11/20/22 1528    Lab Status: Final result Specimen: Urine, Straight Cath Updated: 11/20/22 1619     RBC, UA 1-2 /hpf      WBC, UA None Seen /hpf      Epithelial Cells Occasional /hpf      Bacteria, UA Moderate /hpf      COARSE GRANULAR CASTS 5-10 /lpf      AMORPH URATES Moderate /hpf     Manual Differential(PHLEBS Do Not Order) [[id number]]  (Abnormal) Collected: 11/20/22 1432    Lab Status: Final result Specimen: Blood from Arm, Left Updated: 11/20/22 1551     Segmented % 65 %      Bands % 4 %      Lymphocytes % 20 %      Monocytes % 8 %      Eosinophils, % 1 %      Basophils % 2 %      Absolute Neutrophils 5 11 Thousand/uL      Lymphocytes Absolute 1 48 Thousand/uL      Monocytes Absolute 0 59 Thousand/uL      Eosinophils Absolute 0 07 Thousand/uL      Basophils Absolute 0 15 Thousand/uL      Total Counted --     nRBC 2 /100 WBC      RBC Morphology Present     Anisocytosis Present     Macrocytes Present     Poikilocytes Present     Polychromasia Present     Stomatocytes Present     Platelet Estimate Adequate    CBC and differential [[id number]]  (Abnormal) Collected: 11/20/22 1432    Lab Status: Final result Specimen: Blood from Arm, Left Updated: 11/20/22 1551     WBC 7 40 Thousand/uL      RBC 2 96 Million/uL      Hemoglobin 9 0 g/dL      Hematocrit 32 7 %       fL      MCH 30 4 pg      MCHC 27 5 g/dL      RDW 16 8 %      MPV 10 2 fL      Platelets 162 Thousands/uL     Narrative: This is an appended report  These results have been appended to a previously verified report  UA w Reflex to Microscopic w Reflex to Culture [[id number]]  (Abnormal) Collected: 11/20/22 1528    Lab Status: Final result Specimen: Urine, Straight Cath Updated: 11/20/22 1550     Color, UA Yellow     Clarity, UA Cloudy     Specific Gravity, UA >=1 030     pH, UA 5 5     Leukocytes, UA Negative     Nitrite, UA Negative     Protein, UA 1+ mg/dl      Glucose, UA Negative mg/dl      Ketones, UA Negative mg/dl      Urobilinogen, UA 0 2 E U /dl      Bilirubin, UA Negative     Occult Blood, UA 1+    Urine culture [[id number]] Collected: 11/20/22 1528    Lab Status:  In process Specimen: Urine, Clean Catch Updated: 11/20/22 1531    Procalcitonin [[id number]]  (Normal) Collected: 11/20/22 1432    Lab Status: Final result Specimen: Blood from Arm, Left Updated: 11/20/22 1516     Procalcitonin 0 18 ng/ml     High Sensitivity Troponin I Random [[id number]]  (Normal) Collected: 11/20/22 1432    Lab Status: Final result Specimen: Blood from Arm, Left Updated: 11/20/22 1516     HS TnI random 10 ng/L     Lactic acid [[id number]]  (Normal) Collected: 11/20/22 1432    Lab Status: Final result Specimen: Blood from Arm, Left Updated: 11/20/22 1507     LACTIC ACID 0 5 mmol/L     Narrative:      Result may be elevated if tourniquet was used during collection      Comprehensive metabolic panel [[id number]]  (Abnormal) Collected: 11/20/22 1432    Lab Status: Final result Specimen: Blood from Arm, Left Updated: 11/20/22 1505     Sodium 139 mmol/L      Potassium 5 1 mmol/L      Chloride 101 mmol/L      CO2 37 mmol/L      ANION GAP 1 mmol/L      BUN 20 mg/dL      Creatinine 1 69 mg/dL      Glucose 121 mg/dL      Calcium 9 1 mg/dL      Corrected Calcium 9 7 mg/dL      AST 11 U/L      ALT 6 U/L      Alkaline Phosphatase 139 U/L      Total Protein 7 5 g/dL      Albumin 3 3 g/dL      Total Bilirubin 0 30 mg/dL      eGFR 31 ml/min/1 73sq m     Narrative:      Page guidelines for Chronic Kidney Disease (CKD):   •  Stage 1 with normal or high GFR (GFR > 90 mL/min/1 73 square meters)  •  Stage 2 Mild CKD (GFR = 60-89 mL/min/1 73 square meters)  •  Stage 3A Moderate CKD (GFR = 45-59 mL/min/1 73 square meters)  •  Stage 3B Moderate CKD (GFR = 30-44 mL/min/1 73 square meters)  •  Stage 4 Severe CKD (GFR = 15-29 mL/min/1 73 square meters)  •  Stage 5 End Stage CKD (GFR <15 mL/min/1 73 square meters)  Note: GFR calculation is accurate only with a steady state creatinine    Protime-INR [[id number]]  (Abnormal) Collected: 11/20/22 1432    Lab Status: Final result Specimen: Blood from Arm, Left Updated: 11/20/22 1456     Protime 29 5 seconds      INR 2 75    APTT [[id number]]  (Abnormal) Collected: 11/20/22 1432    Lab Status: Final result Specimen: Blood from Arm, Left Updated: 11/20/22 1456     PTT 58 seconds     Fingerstick Glucose (POCT) [[id number]]  (Normal) Collected: 11/20/22 1408    Lab Status: Final result Updated: 11/20/22 1409     POC Glucose 126 mg/dl                  CT chest abdomen pelvis wo contrast   Final Result by Driss Veloz MD (11/20 1651)      Multifocal scattered patchy nodular opacities in right upper and possibly right lower lobes is degraded by motion artifact, suspicious for infection/inflammation or aspiration  Difficult to exclude underlying pulmonary nodules  Recommend follow-up    chest CT in 8-12 weeks to ensure resolution  Small bilateral pleural effusions with multifocal subsegmental atelectasis  Acute panniculitis  Large right lower quadrant ventral abdominal hernia containing small bowel loops, proximal ascending colon, appendix, and mesenteric fat  No bowel obstruction  Small volume ascites in left hemiabdomen  Additional chronic/incidental findings as detailed above  The study was marked in [facility] for immediate notification  Workstation performed: [id number]         CT head wo contrast   Final Result by Driss Veloz MD (11/20 1628)      No acute intracranial abnormality  1      Workstation performed: [id number]         XR pelvis ap only 1 or 2 views   Final Result by Tamia Oakes MD (11/20 5494)      No acute osseous abnormality demonstrated with somewhat suboptimal images  Workstation performed: [id number]         XR chest 1 view portable   ED Interpretation by Obie Salamanca MD (11/20 3396)   CXR'  Cardiomegaly, L pleural effusion, interstial edema, no change from CXR on 11/16/22      Final Result by Tamia Oakes MD (11/20 2240)      Limited evaluation with possible atelectasis, infiltrate, or effusions in the lung bases                    Workstation performed: [id number] Procedures  ECG 12 Lead Documentation Only    Date/Time: 11/20/2022 3:19 PM  Performed by: Therese Ferguson MD  Authorized by: Therese Ferguson MD     Indications / Diagnosis:  Weakness  Patient location:  ED and bedside  Interpretation:     Interpretation: abnormal    Rate:     ECG rate:  85    ECG rate assessment: normal    Rhythm:     Rhythm: atrial fibrillation    Ectopy:     Ectopy: PVCs    QRS:     QRS axis:  Normal  Conduction:     Conduction: abnormal      Abnormal conduction: non-specific intraventricular conduction delay    ST segments:     ST segments:  Non-specific  Comments:      Low voltage             ED Course  ED Course as of 11/21/22 0720   Sun Nov 20, 2022   1531 Case discussed with her girlfiriend who take care of her and she refuse to send her to NH  Told her her labs are acceptable and she is already still on ABX  SBIRT 22yo+    Flowsheet Row Most Recent Value   SBIRT (23 yo +)    In order to provide better care to our patients, we are screening all of our patients for alcohol and drug use  Would it be okay to ask you these screening questions? No Filed at: 11/20/2022 1507                    MDM  Number of Diagnoses or Management Options  Acute panniculitis  Infiltrate of upper lobe of right lung present on imaging study  Morbid obesity (HCC)  Pleural effusion on left  Weakness  Diagnosis management comments: This is 60 y old came for generalized weakness and lethargy   Pt just discharged from hospital 11/16/22  Pt does not give any history   On P/E she has very large ventral hernia   Pt has no fever  Pt was at hospital for pneumonia and UTI and received rocephin and discharged on Vantin  Pt still on ABX  CT  Chest, abdomen pelvis , came with multiple findings including acute panniculitis, pleural effusion, ascites , large ventral hernia  And patchy nodular infiltrate RUL,RLL   Case discussed with hospitalist dr Mitzi Soares who stated he is not going to start her on new ABX until consult with ID  AS pt still on ABX          Amount and/or Complexity of Data Reviewed  Clinical lab tests: ordered and reviewed  Tests in the radiology section of CPT®: ordered and reviewed  Discussion of test results with the performing providers: yes  Decide to obtain previous medical records or to obtain history from someone other than the patient: yes  Obtain history from someone other than the patient: yes  Review and summarize past medical records: yes  Discuss the patient with other providers: yes  Independent visualization of images, tracings, or specimens: yes    Risk of Complications, Morbidity, and/or Mortality  Presenting problems: high  Diagnostic procedures: high  Management options: low        Disposition  Final diagnoses:   Acute panniculitis   Weakness   Infiltrate of upper lobe of right lung present on imaging study   Pleural effusion on left   Morbid obesity ([address] )     Time reflects when diagnosis was documented in both MDM as applicable and the Disposition within this note     Time User Action Codes Description Comment    11/20/2022  5:20 PM Xander Sahni Add [M79 3] Acute panniculitis     11/20/2022  5:20 PM Xander Sahni Add [R53 1] Weakness     11/20/2022  5:26 PM Xander Sahni Add [R91 8] Infiltrate of upper lobe of right lung present on imaging study     11/20/2022  5:26 PM Xander Sahni Add [J90] Pleural effusion on left     11/20/2022  5:27 PM Xander Sahni Add [E66 01] Morbid obesity [facility])       ED Disposition     ED Disposition   Admit    Condition   Stable    Date/Time   Sun Nov 20, 2022  5:27 PM    Comment   Case was discussed with dr Osmar Munoz and the patient's admission status was agreed to be admitted to [facility] to the service of Dr Kyung Hilario    None         Current Discharge Medication List      CONTINUE these medications which have NOT CHANGED    Details   albuterol (2 5 mg/3 mL) 0 083 % nebulizer solution INHALE CONTENTS OF 1 VIAL ( 3 MILLILITERS ) IN NEBULIZER BY MOUTH AND INTO THE LUNGS EVERY 6 HOURS IF NEEDED FOR WHEEZING OR SHORTNESS OF BREATH  Qty: 75 mL, Refills: 5    Associated Diagnoses: Chronic obstructive pulmonary disease, unspecified COPD type ([facility])      albuterol (PROVENTIL HFA,VENTOLIN HFA) 90 mcg/act inhaler Inhale 2 puffs every 4 (four) hours as needed for wheezing  Qty: 8 5 g, Refills: 12    Comments: Substitution to a formulary equivalent within the same pharmaceutical class is authorized  Associated Diagnoses: COPD with asthma ([facility] )      amLODIPine (NORVASC) 5 mg tablet Take 1 tablet (5 mg total) by mouth daily  Qty: 90 tablet, Refills: 2    Associated Diagnoses: Hypertensive chronic kidney disease with stage 1 through stage 4 chronic kidney disease, or unspecified chronic kidney disease; Stage 3b chronic kidney disease ([facility] ); Secondary hyperparathyroidism of renal origin ([place] [address] ); Anemia due to stage 3b chronic kidney disease ([place] [address] ); Persistent proteinuria; Iron deficiency; Dyslipidemia; H/O right nephrectomy;  Morbid (severe) obesity due to excess calories ([facility])      atorvastatin (LIPITOR) 10 mg tablet Take 1 tablet (10 mg total) by mouth daily  Qty: 90 tablet, Refills: 3    Associated Diagnoses: Dyslipidemia      Blood Glucose Monitoring Suppl ([employer] Pro Glucose Meter) SAVANNAH Use 2 (two) times a day [employer] glucometer, test twice daily  Qty: 1 each, Refills: 0    Associated Diagnoses: Type 2 diabetes mellitus without complication, with long-term current use of insulin ([facility])      [employer] Lancets Ultra Thin 30G MISC Use 2 (two) times a day  Qty: 100 each, Refills: 5    Associated Diagnoses: Type 2 diabetes mellitus without complication, with long-term current use of insulin ([facility])      famotidine (PEPCID) 20 mg tablet Take 1 tablet (20 mg total) by mouth daily  Qty: 90 tablet, Refills: 3    Associated Diagnoses: Gastroesophageal reflux disease without esophagitis      ferrous sulfate 325 (65 Fe) mg tablet Take 325 mg by mouth 2 (two) times a week      levothyroxine 100 mcg tablet take 1 tablet by mouth in THE EARLY MORNING  Qty: 90 tablet, Refills: 3    Associated Diagnoses: Other specified hypothyroidism      LORazepam (ATIVAN) 0 5 mg tablet Take 1 tablet (0 5 mg total) by mouth daily at bedtime  Qty: 30 tablet, Refills: 5    Associated Diagnoses: Panic attack      metoprolol tartrate (LOPRESSOR) 50 mg tablet Take 1 tablet (50 mg total) by mouth 2 (two) times a day  Qty: 180 tablet, Refills: 3    Associated Diagnoses: Paroxysmal atrial fibrillation (HCC)      warfarin (COUMADIN) 3 mg tablet Take 1 tablet (3 mg total) by mouth daily  Qty: 90 tablet, Refills: 3    Associated Diagnoses: Longstanding persistent atrial fibrillation (HCC)      cefpodoxime (VANTIN) 200 mg tablet Take 2 tablets (400 mg total) by mouth 2 (two) times a day with meals for 6 doses Do not start before November 18, 2022  Qty: 12 tablet, Refills: 0    Associated Diagnoses: Pneumonia of right upper lobe due to infectious organism      collagenase (SANTYL) ointment Apply topically daily  Qty: 15 g, Refills: 1    Associated Diagnoses: Leg wound, left, initial encounter             No discharge procedures on file      PDMP Review       Value Time User    PDMP Reviewed  Yes 10/26/2022  1:50 PM Marin Rendon MD          ED Provider  Electronically Signed by           Charlyne Ahumada, MD  11/21/22 8651

## 2022-11-21 ENCOUNTER — HOME CARE VISIT (OUTPATIENT)
Dept: HOME HEALTH SERVICES | Facility: HOME HEALTHCARE | Age: 62
End: 2022-11-21

## 2022-11-21 ENCOUNTER — HOSPITAL ENCOUNTER (INPATIENT)
Facility: HOSPITAL | Age: 62
LOS: 12 days | Discharge: HOME HEALTH | End: 2022-12-03
Attending: INTERNAL MEDICINE | Admitting: INTERNAL MEDICINE

## 2022-11-21 ENCOUNTER — TELEPHONE (OUTPATIENT)
Dept: FAMILY MEDICINE CLINIC | Facility: CLINIC | Age: 62
End: 2022-11-21

## 2022-11-21 VITALS
DIASTOLIC BLOOD PRESSURE: 55 MMHG | BODY MASS INDEX: 50.02 KG/M2 | TEMPERATURE: 98.6 F | SYSTOLIC BLOOD PRESSURE: 104 MMHG | OXYGEN SATURATION: 96 % | RESPIRATION RATE: 20 BRPM | WEIGHT: 293 LBS | HEART RATE: 94 BPM | HEIGHT: 64 IN

## 2022-11-21 VITALS
TEMPERATURE: 98.7 F | BODY MASS INDEX: 50.02 KG/M2 | RESPIRATION RATE: 18 BRPM | DIASTOLIC BLOOD PRESSURE: 68 MMHG | SYSTOLIC BLOOD PRESSURE: 128 MMHG | HEART RATE: 78 BPM | WEIGHT: 293 LBS | HEIGHT: 64 IN | OXYGEN SATURATION: 98 %

## 2022-11-21 DIAGNOSIS — J96.92 HYPERCAPNIC RESPIRATORY FAILURE (HCC): ICD-10-CM

## 2022-11-21 DIAGNOSIS — K55.20 AVM (ARTERIOVENOUS MALFORMATION) OF SMALL BOWEL, ACQUIRED: ICD-10-CM

## 2022-11-21 DIAGNOSIS — J96.21 ACUTE ON CHRONIC RESPIRATORY FAILURE WITH HYPOXIA (HCC): ICD-10-CM

## 2022-11-21 DIAGNOSIS — K92.2 GIB (GASTROINTESTINAL BLEEDING): ICD-10-CM

## 2022-11-21 DIAGNOSIS — J44.9 COPD WITH ASTHMA (HCC): Chronic | ICD-10-CM

## 2022-11-21 DIAGNOSIS — D62 ACUTE BLOOD LOSS ANEMIA: Primary | ICD-10-CM

## 2022-11-21 DIAGNOSIS — F41.9 ANXIETY: ICD-10-CM

## 2022-11-21 DIAGNOSIS — M79.3 ACUTE PANNICULITIS: ICD-10-CM

## 2022-11-21 DIAGNOSIS — K92.1 MELENA: ICD-10-CM

## 2022-11-21 DIAGNOSIS — J18.9 PNEUMONIA OF RIGHT UPPER LOBE DUE TO INFECTIOUS ORGANISM: ICD-10-CM

## 2022-11-21 DIAGNOSIS — D64.9 ACUTE ON CHRONIC ANEMIA: ICD-10-CM

## 2022-11-21 PROBLEM — J96.02 ACUTE RESPIRATORY FAILURE WITH HYPERCAPNIA (HCC): Status: ACTIVE | Noted: 2022-11-21

## 2022-11-21 PROBLEM — G93.41 ACUTE METABOLIC ENCEPHALOPATHY: Status: ACTIVE | Noted: 2022-11-21

## 2022-11-21 PROBLEM — J96.02 ACUTE RESPIRATORY FAILURE WITH HYPERCAPNIA (HCC): Status: RESOLVED | Noted: 2022-11-21 | Resolved: 2022-11-21

## 2022-11-21 PROBLEM — J96.11 CHRONIC RESPIRATORY FAILURE WITH HYPOXIA (HCC): Status: RESOLVED | Noted: 2022-07-27 | Resolved: 2022-11-21

## 2022-11-21 LAB
ALBUMIN SERPL BCP-MCNC: 3 G/DL (ref 3.5–5)
ALP SERPL-CCNC: 131 U/L (ref 34–104)
ALT SERPL W P-5'-P-CCNC: 6 U/L (ref 7–52)
AMMONIA PLAS-SCNC: 54 UMOL/L (ref 18–72)
ANION GAP SERPL CALCULATED.3IONS-SCNC: 4 MMOL/L (ref 4–13)
ARTERIAL PATENCY WRIST A: YES
ARTERIAL PATENCY WRIST A: YES
AST SERPL W P-5'-P-CCNC: 11 U/L (ref 13–39)
ATRIAL RATE: 0 BPM
BACTERIA BLD CULT: NORMAL
BACTERIA BLD CULT: NORMAL
BACTERIA UR CULT: NORMAL
BASE EX.OXY STD BLDV CALC-SCNC: 61.2 % (ref 60–80)
BASE EXCESS BLDA CALC-SCNC: -3.7 MMOL/L
BASE EXCESS BLDA CALC-SCNC: 1.1 MMOL/L
BASE EXCESS BLDA CALC-SCNC: 4.6 MMOL/L
BASE EXCESS BLDV CALC-SCNC: 4.1 MMOL/L
BILIRUB SERPL-MCNC: 0.26 MG/DL (ref 0.2–1)
BODY TEMPERATURE: 37 DEGREES FEHRENHEIT
BUN SERPL-MCNC: 21 MG/DL (ref 5–25)
CALCIUM ALBUM COR SERPL-MCNC: 9.7 MG/DL (ref 8.3–10.1)
CALCIUM SERPL-MCNC: 8.9 MG/DL (ref 8.4–10.2)
CHLORIDE SERPL-SCNC: 101 MMOL/L (ref 96–108)
CO2 SERPL-SCNC: 35 MMOL/L (ref 21–32)
CREAT SERPL-MCNC: 1.63 MG/DL (ref 0.6–1.3)
ERYTHROCYTE [DISTWIDTH] IN BLOOD BY AUTOMATED COUNT: 16.6 % (ref 11.6–15.1)
GFR SERPL CREATININE-BSD FRML MDRD: 33 ML/MIN/1.73SQ M
GLUCOSE SERPL-MCNC: 109 MG/DL (ref 65–140)
HCO3 BLDA-SCNC: 22.4 MMOL/L (ref 22–28)
HCO3 BLDA-SCNC: 28.9 MMOL/L (ref 22–28)
HCO3 BLDA-SCNC: 33.1 MMOL/L (ref 22–28)
HCO3 BLDV-SCNC: 34.8 MMOL/L (ref 24–30)
HCT VFR BLD AUTO: 30.7 % (ref 34.8–46.1)
HGB BLD-MCNC: 8.2 G/DL (ref 11.5–15.4)
INR PPP: 2.93 (ref 0.84–1.19)
IPAP: 25
IPAP: 25
MAGNESIUM SERPL-MCNC: 1.9 MG/DL (ref 1.9–2.7)
MCH RBC QN AUTO: 29.7 PG (ref 26.8–34.3)
MCHC RBC AUTO-ENTMCNC: 26.7 G/DL (ref 31.4–37.4)
MCV RBC AUTO: 111 FL (ref 82–98)
NON VENT- BIPAP: ABNORMAL
O2 CT BLDA-SCNC: 11.7 ML/DL (ref 16–23)
O2 CT BLDA-SCNC: 12.1 ML/DL (ref 16–23)
O2 CT BLDA-SCNC: 8.9 ML/DL (ref 16–23)
O2 CT BLDV-SCNC: 8.1 ML/DL
OXYHGB MFR BLDA: 92.4 % (ref 94–97)
OXYHGB MFR BLDA: 94.4 % (ref 94–97)
OXYHGB MFR BLDA: 96.7 % (ref 94–97)
P AXIS: 214 DEGREES
PCO2 BLDA: 46.7 MM HG (ref 36–44)
PCO2 BLDA: 66.6 MM HG (ref 36–44)
PCO2 BLDA: 77.1 MM HG (ref 36–44)
PCO2 BLDV: 103 MM HG (ref 42–50)
PEEP MAX SETTING VENT: 12 CM[H2O]
PEEP MAX SETTING VENT: 12 CM[H2O]
PH BLDA: 7.25 [PH] (ref 7.35–7.45)
PH BLDA: 7.26 [PH] (ref 7.35–7.45)
PH BLDA: 7.3 [PH] (ref 7.35–7.45)
PH BLDV: 7.15 [PH] (ref 7.3–7.4)
PHOSPHATE SERPL-MCNC: 3.8 MG/DL (ref 2.3–4.1)
PLATELET # BLD AUTO: 242 THOUSANDS/UL (ref 149–390)
PMV BLD AUTO: 10.3 FL (ref 8.9–12.7)
PO2 BLDA: 103.4 MM HG (ref 75–129)
PO2 BLDA: 73.8 MM HG (ref 75–129)
PO2 BLDA: 80.8 MM HG (ref 75–129)
PO2 BLDV: 35.4 MM HG (ref 35–45)
POTASSIUM SERPL-SCNC: 4.7 MMOL/L (ref 3.5–5.3)
PR INTERVAL: 60 MS
PROCALCITONIN SERPL-MCNC: 0.16 NG/ML
PROT SERPL-MCNC: 7.1 G/DL (ref 6.4–8.4)
PROTHROMBIN TIME: 31.1 SECONDS (ref 11.6–14.5)
QRS AXIS: 88 DEGREES
QRSD INTERVAL: 126 MS
QT INTERVAL: 438 MS
QTC INTERVAL: 521 MS
RBC # BLD AUTO: 2.76 MILLION/UL (ref 3.81–5.12)
SODIUM SERPL-SCNC: 140 MMOL/L (ref 135–147)
SPECIMEN SOURCE: ABNORMAL
T WAVE AXIS: 46 DEGREES
VENT BIPAP FIO2: 40 %
VENT BIPAP FIO2: 40 %
VENTRICULAR RATE: 85 BPM
WBC # BLD AUTO: 7.37 THOUSAND/UL (ref 4.31–10.16)

## 2022-11-21 RX ORDER — FUROSEMIDE 10 MG/ML
40 INJECTION INTRAMUSCULAR; INTRAVENOUS 2 TIMES DAILY
Status: CANCELLED | OUTPATIENT
Start: 2022-11-21

## 2022-11-21 RX ORDER — LORAZEPAM 2 MG/ML
0.5 INJECTION INTRAMUSCULAR ONCE
Status: COMPLETED | OUTPATIENT
Start: 2022-11-21 | End: 2022-11-21

## 2022-11-21 RX ORDER — METHYLPREDNISOLONE SODIUM SUCCINATE 125 MG/2ML
125 INJECTION, POWDER, LYOPHILIZED, FOR SOLUTION INTRAMUSCULAR; INTRAVENOUS ONCE
Status: COMPLETED | OUTPATIENT
Start: 2022-11-21 | End: 2022-11-21

## 2022-11-21 RX ORDER — AMLODIPINE BESYLATE 5 MG/1
5 TABLET ORAL DAILY
Status: CANCELLED | OUTPATIENT
Start: 2022-11-22

## 2022-11-21 RX ORDER — LEVALBUTEROL 1.25 MG/.5ML
1.25 SOLUTION, CONCENTRATE RESPIRATORY (INHALATION)
Status: DISCONTINUED | OUTPATIENT
Start: 2022-11-21 | End: 2022-12-03 | Stop reason: HOSPADM

## 2022-11-21 RX ORDER — METHYLPREDNISOLONE SODIUM SUCCINATE 40 MG/ML
40 INJECTION, POWDER, LYOPHILIZED, FOR SOLUTION INTRAMUSCULAR; INTRAVENOUS EVERY 8 HOURS SCHEDULED
Status: DISCONTINUED | OUTPATIENT
Start: 2022-11-22 | End: 2022-11-22

## 2022-11-21 RX ORDER — FUROSEMIDE 10 MG/ML
40 INJECTION INTRAMUSCULAR; INTRAVENOUS ONCE
Status: COMPLETED | OUTPATIENT
Start: 2022-11-21 | End: 2022-11-21

## 2022-11-21 RX ORDER — CEFTRIAXONE 2 G/50ML
2000 INJECTION, SOLUTION INTRAVENOUS EVERY 24 HOURS
Status: CANCELLED | OUTPATIENT
Start: 2022-11-21

## 2022-11-21 RX ORDER — FAMOTIDINE 20 MG/1
20 TABLET, FILM COATED ORAL DAILY
Status: CANCELLED | OUTPATIENT
Start: 2022-11-22

## 2022-11-21 RX ORDER — HYDROXYZINE HYDROCHLORIDE 25 MG/1
25 TABLET, FILM COATED ORAL EVERY 6 HOURS PRN
Status: CANCELLED | OUTPATIENT
Start: 2022-11-21

## 2022-11-21 RX ORDER — HYDROXYZINE HYDROCHLORIDE 25 MG/1
25 TABLET, FILM COATED ORAL EVERY 6 HOURS PRN
Status: DISCONTINUED | OUTPATIENT
Start: 2022-11-21 | End: 2022-11-23

## 2022-11-21 RX ORDER — WARFARIN SODIUM 3 MG/1
3 TABLET ORAL DAILY
Status: CANCELLED | OUTPATIENT
Start: 2022-11-22

## 2022-11-21 RX ORDER — LEVALBUTEROL 1.25 MG/.5ML
1.25 SOLUTION, CONCENTRATE RESPIRATORY (INHALATION)
Status: CANCELLED | OUTPATIENT
Start: 2022-11-21

## 2022-11-21 RX ORDER — LORAZEPAM 2 MG/ML
INJECTION INTRAMUSCULAR
Status: DISCONTINUED
Start: 2022-11-21 | End: 2022-11-21 | Stop reason: HOSPADM

## 2022-11-21 RX ORDER — WARFARIN SODIUM 3 MG/1
3 TABLET ORAL DAILY
Status: DISCONTINUED | OUTPATIENT
Start: 2022-11-22 | End: 2022-11-24

## 2022-11-21 RX ORDER — ATORVASTATIN CALCIUM 10 MG/1
10 TABLET, FILM COATED ORAL DAILY
Status: CANCELLED | OUTPATIENT
Start: 2022-11-22

## 2022-11-21 RX ORDER — GUAIFENESIN 600 MG/1
1200 TABLET, EXTENDED RELEASE ORAL 2 TIMES DAILY
Status: DISCONTINUED | OUTPATIENT
Start: 2022-11-21 | End: 2022-12-03 | Stop reason: HOSPADM

## 2022-11-21 RX ORDER — GUAIFENESIN 600 MG/1
1200 TABLET, EXTENDED RELEASE ORAL 2 TIMES DAILY
Status: CANCELLED | OUTPATIENT
Start: 2022-11-21

## 2022-11-21 RX ORDER — FUROSEMIDE 10 MG/ML
40 INJECTION INTRAMUSCULAR; INTRAVENOUS 2 TIMES DAILY
Status: DISCONTINUED | OUTPATIENT
Start: 2022-11-21 | End: 2022-11-21

## 2022-11-21 RX ORDER — LEVOTHYROXINE SODIUM 0.1 MG/1
100 TABLET ORAL
Status: CANCELLED | OUTPATIENT
Start: 2022-11-22

## 2022-11-21 RX ORDER — METOPROLOL TARTRATE 50 MG/1
50 TABLET, FILM COATED ORAL 2 TIMES DAILY
Status: CANCELLED | OUTPATIENT
Start: 2022-11-21

## 2022-11-21 RX ORDER — FAMOTIDINE 20 MG/1
20 TABLET, FILM COATED ORAL DAILY
Status: DISCONTINUED | OUTPATIENT
Start: 2022-11-22 | End: 2022-11-21

## 2022-11-21 RX ORDER — AMLODIPINE BESYLATE 5 MG/1
5 TABLET ORAL DAILY
Status: DISCONTINUED | OUTPATIENT
Start: 2022-11-22 | End: 2022-11-24

## 2022-11-21 RX ORDER — ACETAMINOPHEN 325 MG/1
650 TABLET ORAL EVERY 6 HOURS PRN
Status: CANCELLED | OUTPATIENT
Start: 2022-11-21

## 2022-11-21 RX ORDER — ACETAMINOPHEN 325 MG/1
650 TABLET ORAL EVERY 6 HOURS PRN
Status: DISCONTINUED | OUTPATIENT
Start: 2022-11-21 | End: 2022-12-03 | Stop reason: HOSPADM

## 2022-11-21 RX ORDER — METOPROLOL TARTRATE 50 MG/1
50 TABLET, FILM COATED ORAL 2 TIMES DAILY
Status: DISCONTINUED | OUTPATIENT
Start: 2022-11-21 | End: 2022-11-23

## 2022-11-21 RX ORDER — FAMOTIDINE 20 MG/1
10 TABLET, FILM COATED ORAL DAILY
Status: DISCONTINUED | OUTPATIENT
Start: 2022-11-22 | End: 2022-11-24

## 2022-11-21 RX ORDER — DEXMEDETOMIDINE HYDROCHLORIDE 4 UG/ML
.1-1.2 INJECTION, SOLUTION INTRAVENOUS
Status: DISCONTINUED | OUTPATIENT
Start: 2022-11-21 | End: 2022-11-23

## 2022-11-21 RX ORDER — LEVOTHYROXINE SODIUM 0.1 MG/1
100 TABLET ORAL
Status: DISCONTINUED | OUTPATIENT
Start: 2022-11-22 | End: 2022-12-03 | Stop reason: HOSPADM

## 2022-11-21 RX ORDER — ATORVASTATIN CALCIUM 10 MG/1
10 TABLET, FILM COATED ORAL DAILY
Status: DISCONTINUED | OUTPATIENT
Start: 2022-11-22 | End: 2022-12-03 | Stop reason: HOSPADM

## 2022-11-21 RX ORDER — CEFTRIAXONE 2 G/50ML
2000 INJECTION, SOLUTION INTRAVENOUS EVERY 24 HOURS
Status: DISCONTINUED | OUTPATIENT
Start: 2022-11-21 | End: 2022-11-22

## 2022-11-21 RX ADMIN — HYDROXYZINE HYDROCHLORIDE 25 MG: 25 TABLET ORAL at 05:37

## 2022-11-21 RX ADMIN — METHYLPREDNISOLONE SODIUM SUCCINATE 125 MG: 125 INJECTION, POWDER, FOR SOLUTION INTRAMUSCULAR; INTRAVENOUS at 16:58

## 2022-11-21 RX ADMIN — COLLAGENASE SANTYL: 250 OINTMENT TOPICAL at 11:20

## 2022-11-21 RX ADMIN — CEFTRIAXONE 2000 MG: 2 INJECTION, SOLUTION INTRAVENOUS at 17:46

## 2022-11-21 RX ADMIN — IPRATROPIUM BROMIDE 0.5 MG: 0.5 SOLUTION RESPIRATORY (INHALATION) at 08:27

## 2022-11-21 RX ADMIN — DEXMEDETOMIDINE HYDROCHLORIDE 0.2 MCG/KG/HR: 4 INJECTION, SOLUTION INTRAVENOUS at 23:54

## 2022-11-21 RX ADMIN — LEVOTHYROXINE SODIUM 100 MCG: 100 TABLET ORAL at 05:37

## 2022-11-21 RX ADMIN — IPRATROPIUM BROMIDE 0.5 MG: 0.5 SOLUTION RESPIRATORY (INHALATION) at 19:50

## 2022-11-21 RX ADMIN — LEVALBUTEROL HYDROCHLORIDE 1.25 MG: 1.25 SOLUTION, CONCENTRATE RESPIRATORY (INHALATION) at 19:50

## 2022-11-21 RX ADMIN — IPRATROPIUM BROMIDE 0.5 MG: 0.5 SOLUTION RESPIRATORY (INHALATION) at 13:41

## 2022-11-21 RX ADMIN — LEVALBUTEROL HYDROCHLORIDE 1.25 MG: 1.25 SOLUTION, CONCENTRATE RESPIRATORY (INHALATION) at 13:41

## 2022-11-21 RX ADMIN — LORAZEPAM 0.5 MG: 2 INJECTION INTRAMUSCULAR; INTRAVENOUS at 06:54

## 2022-11-21 RX ADMIN — FUROSEMIDE 40 MG: 10 INJECTION, SOLUTION INTRAMUSCULAR; INTRAVENOUS at 16:58

## 2022-11-21 RX ADMIN — LEVALBUTEROL HYDROCHLORIDE 1.25 MG: 1.25 SOLUTION, CONCENTRATE RESPIRATORY (INHALATION) at 08:27

## 2022-11-21 NOTE — TELEPHONE ENCOUNTER
Behzad Hart called and left msg on machine stating that she would like  You to know that Westley Russ is in ICU room 6 on the 2nd floor in [place]

## 2022-11-21 NOTE — H&P
Juan Carlos Jenkins 1 1960, 60 y o  female MRN: [id number]  Unit/Bed#: ICU 06 Encounter: [id number]  Primary Care Provider: Linh Baptiste MD   Date and time admitted to hospital: 11/21/2022 12:58 PM    * Hypercapnic respiratory failure ([facility] [address] )  Assessment & Plan  · Baseline oxygen 4L nasal cannula  · Non compliant with CPAP at home  · 11/20 ABG: pH 7 1 pCO2 129 6 pO2 83 1 HCO3 39 6  · Placed on bipap but not tolerating  · CPAP settings increased from 19/6 to 25/12  · 11/21 Repeat ABG: pH 7 25 pCO2 66 6 pO2 73 8 HCO3 28 9    Plan  · Wean oxygen as tolerated  · Respiratory protocol  · Atrovent/Xopenex TID  · Repeat ABG now    Acute metabolic encephalopathy  Assessment & Plan  · Per the patient's significant other, the patient has been having increased lethargy/confusion/generalized weakness since 11/19 when the patient slid off the chair and had to be helped back to bed by EMS  On 11/20 the home care staff called 911 due to patient having slurred speech and barely opening eyes  · 11/19 CT Head: no intracranial mass, mass effect or midline shift  No CT signs of acute infarction  No acute parenchymal hemorrhage  · 11/20 ABG: pH 7 1 pCO2 129 6 pO2 83 1 HCO3 39 6  · Placed on bipap but not tolerating  · CPAP settings increased from 19/6 to 25/12  · 11/21 Repeat ABG: pH 7 25 pCO2 66 6 pO2 73 8 HCO3 28 9  · Ammonia 66   · Blood glucose at baseline    Plan  · Repeat ammonia level now  · Repeat CMP in AM  · Repeat ABG in AM    Pneumonia due to infectious organism  Assessment & Plan  · Admit 11/16-11/17 for pneumonia and discharged on cefpodoxime 400mg BID for three days  · CTA C/A/P 11/20: Multifocal scattered patchy nodular opacities in right upper and possibly right lower lobes is degraded by motion artifact, suspicious for infection/inflammation or aspiration  Difficult to exclude underlying pulmonary nodules    Small bilateral pleural effusions with multifocal subsegmental atelectasis    · No leukocytosis  · Procal negative  · Lactic acid 0 5  · Afebrile  · Legionella/Stept negative  · Ceftriaxone Day 2    Plan  · Repeat CBC in AM  · Continue Ceftriaxone  · Trend fever curve  · Follow up blood cultures  · ID consulted, input appreciated    Panniculitis  Assessment & Plan  · 11/20 CT C/A/P: acute panniculitis  · No leukocytosis    Plan  · ID consulted, input appreciated    Leg wound, left, initial encounter  Assessment & Plan  · Patient came to hospital with multiple wounds    Plan  · Wound care consulted, input appreciated    Diabetes mellitus type 2 in obese [facility])  Assessment & Plan  Lab Results   Component Value Date    HGBA1C 7 0 (H) 11/01/2022       Recent Labs     11/20/22  1408   POCGLU 126       Blood Sugar Average: Last 72 hrs:  · Patient refused insulin while inpatient on 11/16-11/17, stating she wants to confirm with PCP prior to starting diabetes treatment  · NPO while on BIPAP    Plan  · SSI if allowed by patient  · Goal 140-180      Morbid (severe) obesity due to excess calories (HCC)  Assessment & Plan  · BMI 66 5    Plan  · Encourage healthy diet and activity     Anxiety  Assessment & Plan  · Home regimen: 0 5mg ativan    Plan  · Atarax 25mg q6H PRN    GERD (gastroesophageal reflux disease)  Assessment & Plan  · Home regimen: Pepcid 20mg daily    Plan  · Continue Pepcid    Hypothyroid  Assessment & Plan  · Home regimen: levothyroxine 100mg daily    Plan  · Continue levothyroxine    COPD with asthma ([facility] [address] )  Assessment & Plan  · Home regimen: albuterol inhaler and nebulizer  · Baseline: 4L oxygen nasal cannula  · No PFTs noted in chart    Plan  · Atroven/Xopenex TID    Paroxysmal atrial fibrillation (HCC)  Assessment & Plan  · Home regimen: metoprolol 50mg BID and Coumadin   · Current INR: 2 93    Plan:  · Check INR daily  · Resume home coumadin and metoprolol      Dyslipidemia  Assessment & Plan  · Home regimen: Lipitor 10mg daily    Plan  · Continue lipitor    Chronic kidney disease, stage III (moderate) [facility])  Assessment & Plan  Lab Results   Component Value Date    EGFR 33 11/21/2022    EGFR 31 11/20/2022    EGFR 28 11/17/2022    CREATININE 1 63 (H) 11/21/2022    CREATININE 1 69 (H) 11/20/2022    CREATININE 1 87 (H) 11/17/2022     · Baseline Cr: 1 8 - 2 4  · Admission Cr: 1 63    Plan  · Avoid nephrotoxins  · Avoid hypotension  · Strict I&Os  · CMP in AM        -------------------------------------------------------------------------------------------------------------  Chief Complaint: Altered mental status    History of Present Illness   HX and PE limited by patient's altered mental status  Jarred Power is a 60 y o  female who presents from [place] [id number] with hypercapnic respiratory failure and altered mental status  The patient has a PMH of COPD, SASHA not compliant with CPAP, CKD stage 3, DM2, GERD and hypothyroidism  Her significant other states the patient has been having increased lethargy/confusion/weakness since 11/19 when the patient slid off the chair and had to be helped back to bed by EMS  At this time the patient/family refused to go to the hospital  On the morning of 11/20 the patient's home care RN called 911 due to patient having slurred speech and barely opening eyes  ABG pH 7 1 and pCO2 129  Patient placed on BIPAP but not tolerating 2/2 altered mentals status and baseline history of anxiety  Patient was transferred to [address] [place] [address] for closer monitoring and possible need for intubation  To note, patient was admitted 11/16-11/17 for pneumonia and discharged home on cefpodoxime x 3 days  Repeat CT C/A/P on admission showed multifocal scattered patchy nodular opacities in right upper and possibly right lower lobes is degraded by motion artifact, suspicious for infection/inflammation or aspiration   Difficult to exclude underlying pulmonary nodules   Small bilateral pleural effusions with multifocal subsegmental atelectasis  History obtained from significant other and chart review  -------------------------------------------------------------------------------------------------------------  Dispo: Admit to Stepdown Level 1    Code Status: Level 1 - Full Code  --------------------------------------------------------------------------------------------------------------  Review of Systems   Unable to perform ROS: Mental status change       Review of systems was unable to be performed secondary to altered mental status    Physical Exam  Constitutional:       General: She is in acute distress  Appearance: She is obese  HENT:      Head: Normocephalic and atraumatic  Nose: Nose normal       Mouth/Throat:      Mouth: Mucous membranes are dry  Eyes:      Extraocular Movements: Extraocular movements intact  Pupils: Pupils are equal, round, and reactive to light  Cardiovascular:      Rate and Rhythm: Rhythm irregular  Pulses: Normal pulses  Heart sounds: Normal heart sounds  Pulmonary:      Breath sounds: Normal breath sounds  Abdominal:      Palpations: Abdomen is soft  Tenderness: There is no abdominal tenderness  Hernia: A hernia is present  Musculoskeletal:         General: Normal range of motion  Cervical back: Normal range of motion  Right lower leg: No edema  Left lower leg: No edema  Skin:     General: Skin is warm and dry  Capillary Refill: Capillary refill takes less than 2 seconds  Comments: LLE wound - dressing clean/dry/intact   Neurological:      Mental Status: She is alert  She is confused  Motor: Weakness present  Psychiatric:         Mood and Affect: Mood is anxious  Speech: Speech normal          Behavior: Behavior is uncooperative and agitated         --------------------------------------------------------------------------------------------------------------  Vitals:   Vitals:    11/21/22 1305 11/21/22 1333 11/21/22 1341   BP: 133/78 BP Location: Right arm     Pulse: (!) 108     Resp: 15     Temp: 99 °F (37 2 °C)     TempSrc: Axillary     SpO2: 98% 97% 100%   Weight: (!) 153 kg (337 lb 15 4 oz)     Height: 5' 4" (1 626 m)       Temp  Min: 94 2 °F (34 6 °C)  Max: 99 °F (37 2 °C)     Height: 5' 4" (162 6 cm)  Body mass index is 58 01 kg/m²      Laboratory and Diagnostics:  Results from last 7 days   Lab Units 11/21/22  0507 11/20/22  1432 11/17/22  0530 11/16/22  0404   WBC Thousand/uL 7 37 7 40 7 02 9 83   HEMOGLOBIN g/dL 8 2* 9 0* 9 2* 8 8*   HEMATOCRIT % 30 7* 32 7* 34 3* 31 8*   PLATELETS Thousands/uL 242 269 284 292   NEUTROS PCT %  --   --   --  77*   BANDS PCT %  --  4  --   --    MONOS PCT %  --   --   --  7   MONO PCT %  --  8  --   --      Results from last 7 days   Lab Units 11/21/22  0507 11/20/22  1432 11/17/22  0530 11/16/22  0404   SODIUM mmol/L 140 139 137 136   POTASSIUM mmol/L 4 7 5 1 5 1 4 7   CHLORIDE mmol/L 101 101 100 99   CO2 mmol/L 35* 37* 32 34*   ANION GAP mmol/L 4 1* 5 3*   BUN mg/dL 21 20 25 25   CREATININE mg/dL 1 63* 1 69* 1 87* 1 87*   CALCIUM mg/dL 8 9 9 1 8 8 8 4   GLUCOSE RANDOM mg/dL 109 121 142* 205*   ALT U/L 6* 6*  --  5*   AST U/L 11* 11*  --  9*   ALK PHOS U/L 131* 139*  --  121*   ALBUMIN g/dL 3 0* 3 3* 3 3* 3 3*   TOTAL BILIRUBIN mg/dL 0 26 0 30  --  0 35     Results from last 7 days   Lab Units 11/21/22  0507 11/17/22  0530   MAGNESIUM mg/dL 1 9 2 1   PHOSPHORUS mg/dL 3 8 3 5      Results from last 7 days   Lab Units 11/21/22  0507 11/20/22  1432 11/17/22  0625 11/16/22  1029   INR  2 93* 2 75* 1 93* 1 93*   PTT seconds  --  58*  --   --           Results from last 7 days   Lab Units 11/20/22  1432 11/16/22  0404   LACTIC ACID mmol/L 0 5 0 8     ABG:  Results from last 7 days   Lab Units 11/21/22  1323   PH ART  7 251*   PCO2 ART mm Hg 77 1*   PO2 ART mm Hg 80 8   HCO3 ART mmol/L 33 1*   BASE EXC ART mmol/L 4 6   ABG SOURCE  Artery     VBG:  Results from last 7 days   Lab Units 11/21/22  1323 22  0905 22  0507   PH CISCO   --   --  7 147*   PCO2 CISCO mm Hg  --   --  103 0*   PO2 CISCO mm Hg  --   --  35 4   HCO3 CISCO mmol/L  --   --  34 8*   BASE EXC CISCO mmol/L  --   --  4 1   ABG SOURCE  Artery   < >  --     < > = values in this interval not displayed  Results from last 7 days   Lab Units 22  0507 22  1432 22  0530 22  0404   PROCALCITONIN ng/ml 0 16 0 18 0 13 0 11       Micro:  Results from last 7 days   Lab Units 22  1528 22  1441 22  1432 22  1852 22  1029 22  0404   BLOOD CULTURE   --  Received in Microbiology Lab  Culture in Progress  Received in Microbiology Lab  Culture in Progress  --   --  No Growth After 4 Days  No Growth After 4 Days  LEGIONELLA URINARY ANTIGEN  Negative  --   --   --  Negative  --    STREP PNEUMONIAE ANTIGEN, URINE  Negative  --   --  Negative  --   --        EKG: Atrial fibrillation 80-90s  Imaging: I have personally reviewed pertinent reports          Historical Information   Past Medical History:   Diagnosis Date   • Anemia    • Arthritis    • Cancer of kidney ([facility] [address] )    • Chronic kidney disease    • Diabetes mellitus ([facility] [address] )    • Disease of thyroid gland    • HLD (hyperlipidemia)    • Hypertension    • [facility]    • Pneumonia      Past Surgical History:   Procedure Laterality Date   • CHOLECYSTECTOMY     • CT GUIDED PERC DRAINAGE CATHETER PLACEMENT  2016   • GALLBLADDER SURGERY  2004   • HYSTERECTOMY  2018   • NEPHRECTOMY Right 2018     Social History   Social History     Substance and Sexual Activity   Alcohol Use Never    Comment: n/a     Social History     Substance and Sexual Activity   Drug Use Yes   • Types: Marijuana    Comment: smokes with girlfriend when anxious     Social History     Tobacco Use   Smoking Status Former   • Packs/day: 3 00   • Years: 30 00   • Pack years: 90 00   • Types: Cigarettes   • Quit date: 10/22/2010   • Years since quittin 0 Smokeless Tobacco Former   • Quit date: 9/1/2011   Tobacco Comments    quit approx  9 years ago     Family History:   Family History   Problem Relation Age of Onset   • No Known Problems Mother    • No Known Problems Father      I have reviewed this patient's family history and commented on sigificant items within the HPI      Medications:  Current Facility-Administered Medications   Medication Dose Route Frequency   • acetaminophen (TYLENOL) tablet 650 mg  650 mg Oral Q6H PRN   • [START ON 11/22/2022] amLODIPine (NORVASC) tablet 5 mg  5 mg Oral Daily   • [START ON 11/22/2022] atorvastatin (LIPITOR) tablet 10 mg  10 mg Oral Daily   • cefTRIAXone (ROCEPHIN) IVPB (premix in dextrose) 2,000 mg 50 mL  2,000 mg Intravenous Q24H   • [START ON 11/22/2022] collagenase (SANTYL) ointment   Topical Daily   • [START ON 11/22/2022] famotidine (PEPCID) tablet 10 mg  10 mg Oral Daily   • furosemide (LASIX) injection 40 mg  40 mg Intravenous BID   • guaiFENesin (MUCINEX) 12 hr tablet 1,200 mg  1,200 mg Oral BID   • hydrOXYzine HCL (ATARAX) tablet 25 mg  25 mg Oral Q6H PRN   • ipratropium (ATROVENT) 0 02 % inhalation solution 0 5 mg  0 5 mg Nebulization TID   • levalbuterol (XOPENEX) inhalation solution 1 25 mg  1 25 mg Nebulization TID   • [START ON 11/22/2022] levothyroxine tablet 100 mcg  100 mcg Oral Early Morning   • metoprolol tartrate (LOPRESSOR) tablet 50 mg  50 mg Oral BID   • [START ON 11/22/2022] warfarin (COUMADIN) tablet 3 mg  3 mg Oral Daily     Home medications:  Prior to Admission Medications   Prescriptions Last Dose Informant Patient Reported? Taking?    Blood Glucose Monitoring Suppl ([employer] Pro Glucose Meter) SAVANNAH   No No   Sig: Use 2 (two) times a day Embrace glucometer, test twice daily   Embrace Lancets Ultra Thin 30G MISC   No No   Sig: Use 2 (two) times a day   LORazepam (ATIVAN) 0 5 mg tablet   No No   Sig: Take 1 tablet (0 5 mg total) by mouth daily at bedtime   albuterol (2 5 mg/3 mL) 0 083 % nebulizer solution   No No   Sig: INHALE CONTENTS OF 1 VIAL ( 3 MILLILITERS ) IN NEBULIZER BY MOUTH AND INTO THE LUNGS EVERY 6 HOURS IF NEEDED FOR WHEEZING OR SHORTNESS OF BREATH   albuterol (PROVENTIL HFA,VENTOLIN HFA) 90 mcg/act inhaler   No No   Sig: Inhale 2 puffs every 4 (four) hours as needed for wheezing   amLODIPine (NORVASC) 5 mg tablet   No No   Sig: Take 1 tablet (5 mg total) by mouth daily   atorvastatin (LIPITOR) 10 mg tablet   No No   Sig: Take 1 tablet (10 mg total) by mouth daily   cefpodoxime (VANTIN) 200 mg tablet   No No   Sig: Take 2 tablets (400 mg total) by mouth 2 (two) times a day with meals for 6 doses Do not start before November 18, 2022  collagenase (SANTYL) ointment   No No   Sig: Apply topically daily   famotidine (PEPCID) 20 mg tablet   No No   Sig: Take 1 tablet (20 mg total) by mouth daily   ferrous sulfate 325 (65 Fe) mg tablet   Yes No   Sig: Take 325 mg by mouth 2 (two) times a week   levothyroxine 100 mcg tablet   No No   Sig: take 1 tablet by mouth in THE EARLY MORNING   metoprolol tartrate (LOPRESSOR) 50 mg tablet   No No   Sig: Take 1 tablet (50 mg total) by mouth 2 (two) times a day   oxygen gas   Yes No   Sig: Inhale 3 L/min continuous   Indications: Prevention of COPD Worsening   warfarin (COUMADIN) 3 mg tablet   No No   Sig: Take 1 tablet (3 mg total) by mouth daily      Facility-Administered Medications: None     Allergies:  No Known Allergies    ------------------------------------------------------------------------------------------------------------  Advance Directive and Living Will:      Power of :    POLST:    ------------------------------------------------------------------------------------------------------------  Anticipated Length of Stay is > 2 midnights    Care Time Delivered:   Upon my evaluation, this patient had a high probability of imminent or life-threatening deterioration due to altered mental status, hypercapnic respiratory failure requiring BIPAP, which required my direct attention, intervention, and personal management  I have personally provided 30 minutes (1330 to 1400) of critical care time, exclusive of procedures, teaching, family meetings, and any prior time recorded by providers other than myself  TULIO Hernandez        Portions of the record may have been created with voice recognition software  Occasional wrong word or "sound a like" substitutions may have occurred due to the inherent limitations of voice recognition software    Read the chart carefully and recognize, using context, where substitutions have occurred

## 2022-11-21 NOTE — ASSESSMENT & PLAN NOTE
Procalcitonin negative, no leukocytosis  Continue ceftriaxone for pneumonia  ID consulted for evaluation given  no leukocytosis and negative procalcitonin

## 2022-11-21 NOTE — ASSESSMENT & PLAN NOTE
· Home regimen: albuterol inhaler and nebulizer  · Baseline: 4L oxygen nasal cannula  · No PFTs noted in chart    Plan  · Atroven/Xopenex TID

## 2022-11-21 NOTE — RESPIRATORY THERAPY NOTE
RT Protocol Note  Eve Quinn 60 y o  female MRN: [id number]  Unit/Bed#: -01 Encounter: [id number]    Assessment    Principal Problem:    Altered mental status, unspecified  Active Problems:    Chronic kidney disease, stage III (moderate) (HCC)    Dyslipidemia    Paroxysmal atrial fibrillation (HCC)    COPD with asthma (HCC)    Hypothyroid    GERD (gastroesophageal reflux disease)    Diabetes mellitus type 2 in obese (HCC)    Chronic respiratory failure with hypoxia (HCC)    Leg wound, left, initial encounter    Pneumonia due to infectious organism    Abnormal CT scan    Panniculus    Bilateral pleural effusion      Home Pulmonary Medications:  Albuterol/atrovent SVN         Past Medical History:   Diagnosis Date   • Anemia    • Arthritis    • Cancer of kidney (HCC)    • Chronic kidney disease    • Diabetes mellitus (HCC)    • Disease of thyroid gland    • HLD (hyperlipidemia)    • Hypertension    • Ovarian cancer ([facility] [address] )    • Pneumonia      Social History     Socioeconomic History   • Marital status: Single     Spouse name: None   • Number of children: 0   • Years of education: None   • Highest education level: 12th grade   Occupational History   • None   Tobacco Use   • Smoking status: Former     Packs/day: 3 00     Years: 30 00     Pack years: 90 00     Types: Cigarettes     Quit date: 10/22/2010     Years since quittin 0   • Smokeless tobacco: Former     Quit date: 2011   • Tobacco comments:     quit approx   9 years ago   Vaping Use   • Vaping Use: Never used   Substance and Sexual Activity   • Alcohol use: Never     Comment: n/a   • Drug use: Yes     Types: Marijuana     Comment: smokes with girlfriend when anxious   • Sexual activity: Yes     Partners: Female   Other Topics Concern   • None   Social History Narrative    · Most recent tobacco use screenin2020      · Do you currently or have you served in [employer]:   No      · Were you activated, into active duty, as a member of the [email or web address] or as a Reservist:   No      ·     Last modified by [email or web address]   02-, 10:39      Social Determinants of Health     Financial Resource Strain: Not on file   Food Insecurity: No Food Insecurity   • Worried About [employer] in the Last Year: Never true   • Ran Out of Food in the Last Year: Never true   Transportation Needs: No Transportation Needs   • Lack of Transportation (Medical): No   • Lack of Transportation (Non-Medical): No   Physical Activity: Not on file   Stress: Not on file   Social Connections: Not on file   Intimate Partner Violence: Not on file   Housing Stability: Unknown   • Unable to Pay for Housing in the Last Year: No   • Number of Places Lived in the Last Year: Not on file   • Unstable Housing in the Last Year: No       Subjective         Objective    Physical Exam:   Assessment Type: Pre-treatment  General Appearance: Drowsy  Respiratory Pattern: Normal  Chest Assessment: Chest expansion symmetrical, Trachea midline  Bilateral Breath Sounds: Diminished, Expiratory wheezes    Vitals:  Blood pressure 105/64, pulse 96, temperature (!) 97 1 °F (36 2 °C), temperature source Tympanic, resp  rate 18, height 5' 4" (1 626 m), weight (!) 160 kg (352 lb 11 8 oz), SpO2 92 %, not currently breastfeeding  Results from last 7 days   Lab Units 11/20/22  2156   PH ART  7 103*   PCO2 ART mm Hg 129 6*   PO2 ART mm Hg 83 1   HCO3 ART mmol/L 39 6*   BASE EXC ART mmol/L 7 5   O2 CONTENT ART mL/dL 12 2*   O2 HGB, ARTERIAL % 93 1*   ABG SOURCE  Radial, Left   JOHN TEST  Yes       Imaging and other studies:           Plan    Respiratory Plan: Mild Distress pathway    Pt assessed per protocol  Home meds albuterol and atrovent SVN  Pt is drowsy breath sounds diminished with slight exp  Wheezing  Spo2 95 % on 4 liters  Wears 3 liters baseline at home  Ordering per resp protocol  xop/atrovent TID  ABG drawn  Received order for Bipap from ABG results

## 2022-11-21 NOTE — WOUND OSTOMY CARE
Progress Note - Wound   Binh Phelan 60 y o  female MRN: [id number]  Unit/Bed#: ICU 06 Encounter: [id number]         Assessment: This is a 60year old female patient transferred to [address] [place] [address] from [address] [place] [address] on 11/21/22 due to instability and need for oral intubation  She has a history of DM 2, morbid obesity, HTN, and venous stasis disease  She was sleeping but was awake, alert & oriented x 3  Patient seen by this wound RN at [address] [place] [address] this a  m  The patient was hemodynamically unstable and was unable to be turned at time of visit  She was extremely agitated with 1:1 in place as well as her friend and was trying to remove bipap      Assessment Findings:  1-Partial thickness linear wound to mid-sacrum with pink granulation tissue unable to be visualized today but as per Uzair Strickland RN @ [facility], wound is still draining  The patient was too unstable to be turned this a m  - she was on bipap & was to be intubated as per Respiratory tech  This wound is due to moisture  Orders in place for wound care and for prevention  2-Full thickness wound cluster to left lower leg with 50% yellow slough & 50% pink granulation tissue  Moderate amount of serosanguinous drainage noted  Orders were already in place for Santyl & dry sterile dressing  3-Moisture associated skin damage to bilateral breast, abdominal & inguinal areas - unable to assess today as patient was extremely agitated  These areas were assessed last week by this wound RN  Orders in place for wound and skin care  4-Full thickness wounds to bilateral medial thighs due to hidradenitis suppurativa - wounds not assessed today due to severe patient agitation  Orders in place for wound and skin care       Plan:   Skin care Plan:  1-Cleanse sacro-buttocks with soap and water  Apply Maxorb Ag+ silver alginate to mid-sacral wound then cover with Allevyn foam  Vinay with T for treatment  Change every two days and PRN  check skin q-shift    2-Cleanse wound cluster to left lower leg with NSS & pat dry  Apply nickel thick layer of Santyl to both wounds, cover with ABD pad, travis & tape or small Allevyn bordered foam dressing  Change daily & prn soilage/dislodgement  3-Calazime to bilateral skin folds to breasts, inguinal areas, abdominal and medial thighs TID and PRN  4-Turn/reposition q2h or when medically stable for pressure re-distribution on skin   5-Float heels on 2 pillows so heels not in contact with mattress or pillows to offload pressure  6-Moisturize skin daily with skin nourishing cream  7-Ehob cushion in chair when out of bed  8-Hydraguard to bilateral heels BID and PRN  Wound 11/19/22 Pretibial Left; Inferior (Active)   Wound Image   11/21/22 1258   Wound Description Pink;Granulation Tissue; Yellow;Slough 11/21/22 1022   [place]-wound Assessment Dry; Intact 11/21/22 1022   Wound Length (cm) 8 5 cm 11/21/22 1022   Wound Width (cm) 5 cm 11/21/22 1022   Wound Depth (cm) 0 1 cm 11/21/22 1022   Wound Surface Area (cm^2) 42 5 cm^2 11/21/22 1022   Wound Volume (cm^3) 4 25 cm^3 11/21/22 1022   Calculated Wound Volume (cm^3) 4 25 cm^3 11/21/22 1022   Drainage Amount Moderate 11/21/22 1022   Drainage Description Serosanguinous 11/21/22 1022   Dressing Foam, Silicon (eg  Allevyn, etc); Santyl (enzymatic debrider) 11/21/22 1022   Dressing Changed New 11/21/22 1022   Dressing Status Clean;Dry; Intact 11/21/22 1022     Discussed assessment findings, and plan of care/recommendations with [place] RN      Patient to be transferred to ICU for oral intubation (another [place])     Recommendations written as orders    Elsi Zepeda RN, BSN, [employer]

## 2022-11-21 NOTE — ASSESSMENT & PLAN NOTE
· Baseline oxygen 4L nasal cannula  · Non compliant with CPAP at home  · Currently on Bipap for hypercapnic respiratory failure    Plan  · Wean oxygen as tolerated  · Respiratory protocol  · Atrovent/Xopenex TID

## 2022-11-21 NOTE — ASSESSMENT & PLAN NOTE
The patient's significant other admits that patient has been having increased lethargy/confusion/generalized weakness since last evening when she slid down off the chair and had to call EMS to help patient get back to bed  Patient has had increased confusion and lethargy since the evening prior to presentation    CT head - No acute intracranial abnormality  CBC and CMP essentially unchanged from day of discharge 11/17/22  TSH last admission wnl  Ammonia level 66  Patient has not been compliant with cpap at home   ABG on admission showed hypercapnic respiratory acidosis with Ph 7 1 and PCo2 129  Patient placed on bipap overnight  No pH or Co2 improvement in morning Vbg Pip/pep 19/6 PR16, FiO2 45%,  Discussed with Critical care reocommend increasing pressure support over peep to 25/12  Repeat ABG showing improvement with Ph  7 2, pCO2 66, O2 73 8, bicarb 28   Patient more alert trying to take off bipap   Ordered restraints avoid sedatives  Plan to transfer to [facility] without intubation

## 2022-11-21 NOTE — ASSESSMENT & PLAN NOTE
CT showing:   Multifocal scattered patchy nodular opacities in right upper and possibly right lower lobes is degraded by motion artifact, suspicious for infection/inflammation or aspiration  Difficult to exclude underlying pulmonary nodules  Recommend follow-up   chest CT in 8-12 weeks to ensure resolution      Speech consult for swallow eval  Mucinex, incentive spirometry, breathing treatments, sputum cultures ordered    Continue ceftriaxone day 2

## 2022-11-21 NOTE — ASSESSMENT & PLAN NOTE
Acute and chronic respiratory failure with hypoxia, POA, due to PNA evidenced by pH 7 103; pCO2 129 6, respiratory distress; altered mental status treated with 6L NC then Bipap, ABG, CXR, nebulizers and monitoring of respiratory status

## 2022-11-21 NOTE — RESPIRATORY THERAPY NOTE
Pt removed bipap mask attempted to reapply mask x3 times pt kept removing mask  Placed pt on oxygen at 4 liters pt kept removing nasal cannula  Pt Sp02 73% on room air  Request restraints from rn and advanced provider placed pt back on oxygen Spo2 93% on 4 liters  RR 20, breath sounds diminished  No distress noted

## 2022-11-21 NOTE — H&P
[address] 1960, 60 y o  female MRN: [id number]  Unit/Bed#: -01 Encounter: [id number]  Primary Care Provider: Jb Jorge MD   Date and time admitted to hospital: 11/20/2022  2:04 PM    * Altered mental status, unspecified  Assessment & Plan  The patient's significant other admits that patient has been having increased lethargy/confusion/generalized weakness since last evening when she slid down off the chair and had to call EMS to help patient get back to bed  Patient has had increased confusion and lethargy since the evening prior to presentation    CT head - No acute intracranial abnormality  CBC and CMP essentially unchanged from day of discharge 11/17/22  TSH last admission wnl  Follow-up ammonia level  Follow-up ABG  ? Infection as the cause of change in mentation  Patient has not been compliant with cpap at home        Abnormal CT scan  Assessment & Plan   CTA chest abd pelvis showing:    Multifocal scattered patchy nodular opacities in right upper and possibly right lower lobes is degraded by motion artifact, suspicious for infection/inflammation or aspiration  Difficult to exclude underlying pulmonary nodules  Recommend follow-up   chest CT in 8-12 weeks to ensure resolution       Small bilateral pleural effusions with multifocal subsegmental atelectasis      Acute panniculitis      Large right lower quadrant ventral abdominal hernia containing small bowel loops, proximal ascending colon, appendix, and mesenteric fat  No bowel obstruction      Small volume ascites in left hemiabdomen      Additional chronic/incidental findings as detailed above           Pneumonia due to infectious organism  Assessment & Plan  CT showing:   Multifocal scattered patchy nodular opacities in right upper and possibly right lower lobes is degraded by motion artifact, suspicious for infection/inflammation or aspiration    Difficult to exclude underlying pulmonary nodules  Recommend follow-up   chest CT in 8-12 weeks to ensure resolution  Continue ceftriaxone day 1    Panniculus  Assessment & Plan  Procalcitonin negative, no leukocytosis  Continue ceftriaxone  F/U Id consult    Diabetes mellitus type 2 in obese [facility])  Assessment & Plan  Lab Results   Component Value Date    HGBA1C 7 0 (H) 11/01/2022       Recent Labs     11/20/22  1408   POCGLU 126       Blood Sugar Average: Last 72 hrs:  (P) 126     Patient refused insulin on last admission  States that they want to confirm with her PCP prior to starting any kind of diabetes treatment    Chronic kidney disease, stage III (moderate) ([facility])  Assessment & Plan  Lab Results   Component Value Date    EGFR 31 11/20/2022    EGFR 28 11/17/2022    EGFR 28 11/16/2022    CREATININE 1 69 (H) 11/20/2022    CREATININE 1 87 (H) 11/17/2022    CREATININE 1 87 (H) 11/16/2022     Per Dr Lukas Terrazas last note in August 2022:  new baseline appears to be from 1 8-as high as 2 4  Continue to monitor Cr    Bilateral pleural effusion  Assessment & Plan  Small bilateral pleural effusions noted on CT chest    Updated Echo 11/16/22: Technically difficult study  Left ventricular cavity size is normal  Wall thickness is mildly increased  There is concentric remodeling  Wall motion and left ventricular ejection fraction cannot be accurately assessed  Left ventricular systolic function appears normal in limited views      Start lasix 40 IV bid for now and reassess    Leg wound, left, initial encounter  Assessment & Plan  Wound care consult    Chronic respiratory failure with hypoxia ([address] )  Assessment & Plan  On 4L NC at baseline  Currently at 6L NC  Titrate o2 88-92%    GERD (gastroesophageal reflux disease)  Assessment & Plan  Resume Pepcid    Hypothyroid  Assessment & Plan  Resume home levothyroxine    COPD with asthma ([address] )  Assessment & Plan  Currently does not appear to be in COPD exacerbation  Continue home inhalers    Paroxysmal atrial fibrillation [facility])  Assessment & Plan  Continue metoprolol 50 mg b i d  Resume home Coumadin  INR today 2 75    Dyslipidemia  Assessment & Plan  Resume statin    VTE Pharmacologic Prophylaxis: VTE Score: 6 High Risk (Score >/= 5) - Pharmacological DVT Prophylaxis Ordered: warfarin (Coumadin)  Sequential Compression Devices Ordered  Code Status: Level 1 - Full Code   Discussion with family: Updated  (significant other) at bedside  Anticipated Length of Stay: Patient will be admitted on an inpatient basis with an anticipated length of stay of greater than 2 midnights secondary to ams  Total Time for Visit, including Counseling / Coordination of Care: 45 minutes Greater than 50% of this total time spent on direct patient counseling and coordination of care  Chief Complaint: Generalized weakness/Lethargy    History of Present Illness:  Asia Peralta is a 60 y o  female with a PMH of COPD, obstructive sleep apnea not compliant with CPAP, CKD stage 3, diabetes mellitus type 2 GERD, hypothyroidism, who presents with increasing lethargy/generalized weakness for last 24 hours  Patient's significant other states that last evening patient slid off her chair was unable to get back up  EMS was called who had to come in and help her get back to the chair  Patient's significant other states that she has been lethargic ever since  Upon arrival to the ED labs were essentially unchanged from discharge 11/17/22  CT head was negative for acute findings  CT chest abdomen pelvis was noted for multifocal scattered patchy nodular opacities in the right upper and possibly right lower lobes, small bilateral pleural effusions, acute panniculitis, large ventral hernia and small volume ascites in left hemidiaphragm  Patient was admitted for further workup      Review of Systems:  Review of Systems   Unable to perform ROS: Mental status change     x  Past Medical and Surgical History:   Past Medical History:   Diagnosis Date   • Anemia    • Arthritis    • Cancer of kidney ([facility] )    • Chronic kidney disease    • Diabetes mellitus ([facility] )    • Disease of thyroid gland    • HLD (hyperlipidemia)    • Hypertension    • Ovarian cancer ([facility] )    • Pneumonia        Past Surgical History:   Procedure Laterality Date   • CHOLECYSTECTOMY     • CT GUIDED PERC DRAINAGE CATHETER PLACEMENT  5/16/2016   • GALLBLADDER SURGERY  05/01/2004   • HYSTERECTOMY  06/01/2018   • NEPHRECTOMY Right 08/02/2018       Meds/Allergies:  Prior to Admission medications    Medication Sig Start Date End Date Taking?  Authorizing Provider   albuterol (2 5 mg/3 mL) 0 083 % nebulizer solution INHALE CONTENTS OF 1 VIAL ( 3 MILLILITERS ) IN NEBULIZER BY MOUTH AND INTO THE LUNGS EVERY 6 HOURS IF NEEDED FOR WHEEZING OR SHORTNESS OF BREATH 2/8/21   Lisa Andrews MD   albuterol (PROVENTIL HFA,[facility]) 90 mcg/act inhaler Inhale 2 puffs every 4 (four) hours as needed for wheezing 10/26/22 1/24/23  Aníbal Johnston MD   amLODIPine (NORVASC) 5 mg tablet Take 1 tablet (5 mg total) by mouth daily 10/26/22 1/24/23  Aníbal Johnston MD   atorvastatin (LIPITOR) 10 mg tablet Take 1 tablet (10 mg total) by mouth daily 10/26/22   Aníbal Johnston MD   Blood Glucose Monitoring Suppl ([employer] Endeavors Pro Glucose Meter) SAVANNAH Use 2 (two) times a day [employer] Endeavors glucometer, test twice daily 11/30/21   Lisa Andrews MD   cefpodoxime Birdie Lather) 200 mg tablet Take 2 tablets (400 mg total) by mouth 2 (two) times a day with meals for 6 doses Do not start before November 18, 2022 11/18/22 11/21/22  Husam John DO   collagenase (SANTYL) ointment Apply topically daily 10/26/22   MD Chary Ramírezace Lancets Ultra Thin 30G MISC Use 2 (two) times a day 8/23/21   Lisa Andrews MD   famotidine (PEPCID) 20 mg tablet Take 1 tablet (20 mg total) by mouth daily 10/26/22   Aníbal Johnston MD   ferrous sulfate 325 (65 Fe) mg tablet Take 325 mg by mouth 2 (two) times a week    Historical Provider, MD   levothyroxine 100 mcg tablet take 1 tablet by mouth in [address]   Walter Ellison MD   LORazepam (ATIVAN) 0 5 mg tablet Take 1 tablet (0 5 mg total) by mouth daily at bedtime 22   Walter Ellison MD   metoprolol tartrate (LOPRESSOR) 50 mg tablet Take 1 tablet (50 mg total) by mouth 2 (two) times a day 10/26/22   Walter Ellison MD   warfarin (COUMADIN) 3 mg tablet Take 1 tablet (3 mg total) by mouth daily 10/26/22   Walter Ellison MD     I have reviewed home medications using recent Epic encounter  Allergies: No Known Allergies    Social History:  Marital Status: Single   Occupation: disabled  Patient Pre-hospital Living Situation: Home  Patient Pre-hospital Level of Mobility: unable to be assessed at time of evaluation  Patient Pre-hospital Diet Restrictions: none  Substance Use History:   Social History     Substance and Sexual Activity   Alcohol Use Never    Comment: n/a     Social History     Tobacco Use   Smoking Status Former   • Packs/day: 3 00   • Years: 30 00   • Pack years: 90 00   • Types: Cigarettes   • Quit date: 10/22/2010   • Years since quittin 0   Smokeless Tobacco Former   • Quit date: 2011   Tobacco Comments    quit approx  9 years ago     Social History     Substance and Sexual Activity   Drug Use Yes   • Types: Marijuana    Comment: smokes with girlfriend when anxious       Family History:  Family History   Problem Relation Age of Onset   • No Known Problems Mother    • No Known Problems Father        Physical Exam:     Vitals:   Blood Pressure: 113/65 (22)  Pulse: 84 (22)  Temperature: (!) 96 °F (35 6 °C) (22)  Temp Source: Tympanic (22)  Respirations: 16 (22)  Height: 5' 4" (162 6 cm) (22)  Weight - Scale: (!) 159 kg (349 lb 6 9 oz) (22)  SpO2: 95 % (22)    Physical Exam  Vitals reviewed  Constitutional:       Appearance: She is obese  She is ill-appearing  Comments: Somnolent but arousable   HENT:      Head: Normocephalic and atraumatic  Right Ear: External ear normal       Left Ear: External ear normal       Nose: Nose normal       Mouth/Throat:      Mouth: Mucous membranes are moist       Pharynx: Oropharynx is clear  Eyes:      Extraocular Movements: Extraocular movements intact  Cardiovascular:      Rate and Rhythm: Normal rate  Rhythm irregular  Heart sounds: Normal heart sounds  Pulmonary:      Effort: Pulmonary effort is normal       Breath sounds: Rhonchi present  Comments: Decreased breath sounds bilaterally  Abdominal:      General: Abdomen is flat  There is distension  Palpations: Abdomen is soft  Tenderness: There is no abdominal tenderness  There is no guarding or rebound  Comments: Ventral hernia   Musculoskeletal:      Cervical back: Normal range of motion  Right lower leg: Edema present  Left lower leg: Edema present  Skin:     General: Skin is warm and dry  Neurological:      Mental Status: She is disoriented  Additional Data:     Lab Results:  Results from last 7 days   Lab Units 11/20/22  1432 11/17/22  0530 11/16/22  0404   WBC Thousand/uL 7 40   < > 9 83   HEMOGLOBIN g/dL 9 0*   < > 8 8*   HEMATOCRIT % 32 7*   < > 31 8*   PLATELETS Thousands/uL 269   < > 292   BANDS PCT % 4  --   --    NEUTROS PCT %  --   --  77*   LYMPHS PCT %  --   --  11*   LYMPHO PCT % 20  --   --    MONOS PCT %  --   --  7   MONO PCT % 8  --   --    EOS PCT % 1  --  2    < > = values in this interval not displayed       Results from last 7 days   Lab Units 11/20/22  1432   SODIUM mmol/L 139   POTASSIUM mmol/L 5 1   CHLORIDE mmol/L 101   CO2 mmol/L 37*   BUN mg/dL 20   CREATININE mg/dL 1 69*   ANION GAP mmol/L 1*   CALCIUM mg/dL 9 1   ALBUMIN g/dL 3 3*   TOTAL BILIRUBIN mg/dL 0 30   ALK PHOS U/L 139*   ALT U/L 6*   AST U/L 11*   GLUCOSE RANDOM mg/dL 121     Results from last 7 days   Lab Units 11/20/22  1432   INR  2 75*     Results from last 7 days   Lab Units 11/20/22  1408 11/17/22  1107 11/17/22  0719 11/16/22  2031 11/16/22  1557 11/16/22  1125   POC GLUCOSE mg/dl 126 144* 127 154* 150* 113         Results from last 7 days   Lab Units 11/20/22  1432 11/17/22  0530 11/16/22  0404   LACTIC ACID mmol/L 0 5  --  0 8   PROCALCITONIN ng/ml 0 18 0 13 0 11       Lines/Drains:  Invasive Devices     Peripheral Intravenous Line  Duration           Peripheral IV 11/20/22 Left Antecubital <1 day                    Imaging: Reviewed radiology reports from this admission including: chest CT scan, abdominal/pelvic CT and CT head  CT chest abdomen pelvis wo contrast   Final Result by Ida Harman MD (11/20 4930)      Multifocal scattered patchy nodular opacities in right upper and possibly right lower lobes is degraded by motion artifact, suspicious for infection/inflammation or aspiration  Difficult to exclude underlying pulmonary nodules  Recommend follow-up    chest CT in 8-12 weeks to ensure resolution  Small bilateral pleural effusions with multifocal subsegmental atelectasis  Acute panniculitis  Large right lower quadrant ventral abdominal hernia containing small bowel loops, proximal ascending colon, appendix, and mesenteric fat  No bowel obstruction  Small volume ascites in left hemiabdomen  Additional chronic/incidental findings as detailed above  The study was marked in [facility] for immediate notification  Workstation performed: [id number]         CT head wo contrast   Final Result by Ida Harman MD (11/20 5756)      No acute intracranial abnormality              1      Workstation performed: [id number]         XR chest 1 view portable   ED Interpretation by Chandni Bonner MD (11/20 0063)   CXR'  Cardiomegaly, L pleural effusion, interstial edema, no change from CXR on 11/16/22      XR pelvis ap only 1 or 2 views    (Results Pending)       EKG and Other Studies Reviewed on Admission:   · EKG: Atrial fibrillation  HR 85     ** Please Note: This note has been constructed using a voice recognition system   **

## 2022-11-21 NOTE — SPEECH THERAPY NOTE
Consult received  Communicated with RN via text (relaying my place to see a few of her pt's this pm)  She informed me that transfer to [facility] planned for this pt  SLP to follow up if/as indicated

## 2022-11-21 NOTE — DISCHARGE SUMMARY
Blayne 44  Discharge- Jacky Waite 1960, 60 y o  female MRN: [id number]  Unit/Bed#: [facility] Encounter: [id number]  Primary Care Provider: Felix Sheehan MD   Date and time admitted to hospital: 11/20/2022  2:04 PM    * Acute metabolic encephalopathy  Assessment & Plan  The patient's significant other admits that patient has been having increased lethargy/confusion/generalized weakness since last evening when she slid down off the chair and had to call EMS to help patient get back to bed  Patient has had increased confusion and lethargy since the evening prior to presentation    CT head - No acute intracranial abnormality  CBC and CMP essentially unchanged from day of discharge 11/17/22  TSH last admission wnl  Ammonia level 66  Patient has not been compliant with cpap at home   ABG on admission showed hypercapnic respiratory acidosis with Ph 7 1 and PCo2 129  Patient placed on bipap overnight  No pH or Co2 improvement in morning Vbg Pip/pep 19/6 PR16, FiO2 45%,  Discussed with Critical care reocommend increasing pressure support over peep to 25/12  Repeat ABG showing improvement with Ph  7 2, pCO2 66, O2 73 8, bicarb 28   Patient more alert trying to take off bipap  Ordered restraints avoid sedatives  Plan to transfer to [facility] without intubation      Abnormal CT scan  Assessment & Plan   CTA chest abd pelvis showing:    Multifocal scattered patchy nodular opacities in right upper and possibly right lower lobes is degraded by motion artifact, suspicious for infection/inflammation or aspiration  Difficult to exclude underlying pulmonary nodules  Recommend follow-up   chest CT in 8-12 weeks to ensure resolution       Small bilateral pleural effusions with multifocal subsegmental atelectasis      Acute panniculitis      Large right lower quadrant ventral abdominal hernia containing small bowel loops, proximal ascending colon, appendix, and mesenteric fat    No bowel obstruction      Small volume ascites in left hemiabdomen      Additional chronic/incidental findings as detailed above           Pneumonia due to infectious organism  Assessment & Plan  CT showing:   Multifocal scattered patchy nodular opacities in right upper and possibly right lower lobes is degraded by motion artifact, suspicious for infection/inflammation or aspiration  Difficult to exclude underlying pulmonary nodules  Recommend follow-up   chest CT in 8-12 weeks to ensure resolution  Speech consult for swallow eval  Mucinex, incentive spirometry, breathing treatments, sputum cultures ordered    Continue ceftriaxone day 2    Bilateral pleural effusion  Assessment & Plan  Small bilateral pleural effusions noted on CT chest    Updated Echo 11/16/22: Technically difficult study  Left ventricular cavity size is normal  Wall thickness is mildly increased  There is concentric remodeling  Wall motion and left ventricular ejection fraction cannot be accurately assessed  Left ventricular systolic function appears normal in limited views      Continue lasix 40 IV bid for now and reassess    Panniculus  Assessment & Plan  Procalcitonin negative, no leukocytosis  Continue ceftriaxone for pneumonia  ID consulted for evaluation given  no leukocytosis and negative procalcitonin    Diabetes mellitus type 2 in obese [facility])  Assessment & Plan  Lab Results   Component Value Date    HGBA1C 7 0 (H) 11/01/2022       Recent Labs     11/20/22  1408   POCGLU 126       Blood Sugar Average: Last 72 hrs:  (P) 126     Patient refused insulin while inpatient on last admission  States that they want to confirm with her PCP prior to starting any kind of diabetes treatment    Chronic kidney disease, stage III (moderate) [facility])  Assessment & Plan  Lab Results   Component Value Date    EGFR 33 11/21/2022    EGFR 31 11/20/2022    EGFR 28 11/17/2022    CREATININE 1 63 (H) 11/21/2022    CREATININE 1 69 (H) 11/20/2022    CREATININE 1 87 (H) 11/17/2022     Per Nephrology last note in August 2022:  new baseline appears to be from 1 8-as high as 2 4  Continue to monitor Cr    Acute on chronic respiratory failure with hypoxia (HCC)  Assessment & Plan  Acute and chronic respiratory failure with hypoxia, POA, due to PNA evidenced by pH 7 103; pCO2 129 6, respiratory distress; altered mental status treated with 6L NC then Bipap, ABG, CXR, nebulizers and monitoring of respiratory status            Leg wound, left, initial encounter  Assessment & Plan  Wound care consult    Chronic respiratory failure with hypoxia (HCC)  Assessment & Plan  On 4L NC at baseline  Titrate o2 88-92%  Currently on bipap for hypercapnic respiratory failure    GERD (gastroesophageal reflux disease)  Assessment & Plan  Resume Pepcid    Hypothyroid  Assessment & Plan  Resume home levothyroxine    COPD with asthma ([facility] [address] )  Assessment & Plan  Currently does not appear to be in COPD exacerbation  Continue home inhalers    Paroxysmal atrial fibrillation (HCC)  Assessment & Plan  Continue metoprolol 50 mg b i d  Resume home Coumadin  INR today 2 93    Dyslipidemia  Assessment & Plan  Resume statin      Medical Problems     Resolved Problems  Date Reviewed: 11/21/2022   None       Discharging Physician / Practitioner: Brando Waddell DO  PCP: Carmen Issa MD  Admission Date:       Admission Orders (From admission, onward)       Ordered         11/20/22 1728   INPATIENT ADMISSION  Once                         Discharge Date: 11/21/22     Consultations During Hospital Stay:  • ID     Procedures Performed:   • Abg     Significant Findings / Test Results:   • Hypercapnic respiratory acidosis  • Pneumonia and panniculitis on CT chest abdomen pelvis     Incidental Findings:   • See above   • I reviewed the above mentioned incidental findings with the patient and/or family and they expressed understanding      Test Results Pending at Discharge (will require follow up):    • Blood cultures Outpatient Tests Requested:  • n/a     Complications:  n/a     Reason for Admission: Encephalopathy     Hospital Course:   Miryam Madrigal is a 60 y o  female patient who originally presented to the hospital on 11/20/2022 due to increasing lethargy and confusion for 24 hours prior to presenting to the ER  CT head was negative for acute findings  CT chest abdomen pelvis done in the ER showed right upper lobe and possibly right lower lobe scattered patchy opacities suspicious for infection versus inflammation or aspiration  Also noted to have small bilateral pleural effusions, acute panniculitis, and large right lower quadrant ventral abdominal hernia  On admission ABG was obtained showing hypercapnic respiratory acidosis  Patient was placed on BiPAP overnight with no significant improvement in acidosis or CO2  Case was discussed with critical care early this morning at [facility] who recommended BiPAP changes (increasing inspiratory pressure to 25 and expiratory pressure to 12) man reassessing ABG to see whether patient requires intubation  Repeat ABG shows improvement in acidosis with a pH of 7 2 and improvement and CO2 down to 66  Discussed with critical care plan to transfer patient without intubation on BiPAP  Patient was also placed on Lasix 40 IV b i d  For the bilateral pleural effusions  Ceftriaxone was restarted for her recent pneumonia and an ID consult was placed given that patient has panniculus with normal WBC and negative procalcitonin             Please see above list of diagnoses and related plan for additional information       Condition at Discharge: serious     Discharge Day Visit / Exam:   Subjective:   The patient is confused/agitated on BiPAP unable to obtain history  Vitals: Blood Pressure: 104/55 (11/21/22 0810)  Pulse: 94 (11/21/22 0704)  Temperature: 97 5 °F (36 4 °C) (11/21/22 0704)  Temp Source: Tympanic (11/21/22 0704)  Respirations: 20 (11/21/22 0704)  Height: 5' 4" (162 6 cm) (11/20/22 2111)  Weight - Scale: (!) 160 kg (352 lb 11 8 oz) (11/21/22 1259)  SpO2: 96 % (11/21/22 0827)  Exam:   Physical Exam  Vitals reviewed  Constitutional:       Appearance: She is obese  She is ill-appearing  HENT:      Head: Normocephalic and atraumatic  Right Ear: External ear normal       Left Ear: External ear normal       Nose: Nose normal       Mouth/Throat:      Mouth: Mucous membranes are moist       Pharynx: Oropharynx is clear  Eyes:      Extraocular Movements: Extraocular movements intact  Cardiovascular:      Rate and Rhythm: Normal rate and regular rhythm  Heart sounds: Normal heart sounds  Pulmonary:      Comments: Decreased breath sounds bilaterally  Abdominal:      General: Abdomen is flat  Palpations: Abdomen is soft  Tenderness: There is no abdominal tenderness  Musculoskeletal:      Cervical back: Normal range of motion  Right lower leg: Edema present  Left lower leg: Edema present  Skin:     General: Skin is warm and dry  Neurological:      Mental Status: She is alert  She is disoriented           Discussion with Family: Updated  (significant other) at bedside      Discharge instructions/Information to patient and family:   See after visit summary for information provided to patient and family        Provisions for Follow-Up Care:  See after visit summary for information related to follow-up care and any pertinent home health orders  Disposition:   [address] Transfer to [facility]     Planned Readmission: yes     Discharge Statement:  I spent 80 minutes discharging the patient  This time was spent on the day of discharge  I had direct contact with the patient on the day of discharge  Greater than 50% of the total time was spent examining patient, answering all patient questions, arranging and discussing plan of care with patient as well as directly providing post-discharge instructions    Additional time then spent on discharge activities      Discharge Medications:  See after visit summary for reconciled discharge medications provided to patient and/or family        **Please Note: This note may have been constructed using a voice recognition system**              Revision History

## 2022-11-21 NOTE — ASSESSMENT & PLAN NOTE
The patient's significant other admits that patient has been having increased lethargy/confusion/generalized weakness since last evening when she slid down off the chair and had to call EMS to help patient get back to bed  Patient has had increased confusion and lethargy since the evening prior to presentation    CT head - No acute intracranial abnormality  CBC and CMP essentially unchanged from day of discharge 11/17/22  TSH last admission wnl  Ammonia level 66  Patient has not been compliant with cpap at home   ABG on admission showed hypercapnic respiratory acidosis with Ph 7 1 and PCo2 129  Patient placed on bipap overnight  No pH or Co2 improvement in morning Vbg Pip/pep 19/6 PR16, FiO2 45%,  Discussed with Critical care reocommend increasing pressure support over peep to 25/12  Repeat ABG showing improvement with Ph  7 2, pCO2 66, O2 73 8, bicarb 28   Patient more alert trying to take off bipap   Ordered restraints avoid sedatives  Plan to transfer to [facility] without intubation

## 2022-11-21 NOTE — DISCHARGE INSTR - OTHER ORDERS
Plan:   Skin care Plan:    1-Cleanse sacro-buttocks with soap and water  Apply Maxorb Ag+ silver alginate or equivalent to mid-sacral wound cut to size then cover with Allevyn foam  Vinay with T for treatment  Change every two days and as needed  2-Cleanse wound cluster to left lower leg with wound cleanser & pat dry  Apply nickel thick layer of Santyl to both wounds, cover with ABD pad, travis & tape or small Allevyn bordered foam dressing  Change daily & as needed for soilage/dislodgement  3-Calazime to bilateral skin folds to breasts, inguinal areas, abdominal and medial thighs 3x/day and as needed  4-Turn/reposition every 2 hrs or when medically stable for pressure re-distribution on skin   5-Float heels on 2 pillows so heels not in contact with mattress or pillows to offload pressure  6-Moisturize skin daily with skin nourishing cream  7-Pressure cushion in chair when out of bed  8-Hydraguard or equivalent to bilateral heels 2x/day and as needed

## 2022-11-21 NOTE — PLAN OF CARE
Problem: Potential for Falls  Goal: Patient will remain free of falls  Description: INTERVENTIONS:  - Educate patient/family on patient safety including physical limitations  - Instruct patient to call for assistance with activity   - Consult OT/PT to assist with strengthening/mobility   - Keep Call bell within reach  - Keep bed low and locked with side rails adjusted as appropriate  - Keep care items and personal belongings within reach  - Initiate and maintain comfort rounds  - Make Fall Risk Sign visible to staff  - Offer Toileting every 2 Hours, in advance of need  - Initiate/Maintain bed alarm  - Obtain necessary fall risk management equipment:   - Apply yellow socks and bracelet for high fall risk patients  - Consider moving patient to room near nurses station  Outcome: Progressing     Problem: Prexisting or High Potential for Compromised Skin Integrity  Goal: Skin integrity is maintained or improved  Description: INTERVENTIONS:  - Identify patients at risk for skin breakdown  - Assess and monitor skin integrity  - Assess and monitor nutrition and hydration status  - Monitor labs   - Assess for incontinence   - Turn and reposition patient  - Assist with mobility/ambulation  - Relieve pressure over bony prominences  - Avoid friction and shearing  - Provide appropriate hygiene as needed including keeping skin clean and dry  - Evaluate need for skin moisturizer/barrier cream  - Collaborate with interdisciplinary team   - Patient/family teaching  - Consider wound care consult   Outcome: Progressing     Problem: MOBILITY - ADULT  Goal: Maintain or return to baseline ADL function  Description: INTERVENTIONS:  -  Assess patient's ability to carry out ADLs; assess patient's baseline for ADL function and identify physical deficits which impact ability to perform ADLs (bathing, care of mouth/teeth, toileting, grooming, dressing, etc )  - Assess/evaluate cause of self-care deficits   - Assess range of motion  - Assess patient's mobility; develop plan if impaired  - Assess patient's need for assistive devices and provide as appropriate  - Encourage maximum independence but intervene and supervise when necessary  - Involve family in performance of ADLs  - Assess for home care needs following discharge   - Consider OT consult to assist with ADL evaluation and planning for discharge  - Provide patient education as appropriate  Outcome: Progressing  Goal: Maintains/Returns to pre admission functional level  Description: INTERVENTIONS:  - Perform BMAT or MOVE assessment daily    - Set and communicate daily mobility goal to care team and patient/family/caregiver  - Collaborate with rehabilitation services on mobility goals if consulted  - Perform Range of Motion 2 times a day  - Reposition patient every 2 hours    - Dangle patient 2 times a day  - Stand patient 2 times a day  - Ambulate patient 2 times a day  - Out of bed to chair 2 times a day   - Out of bed for meals 2 times a day  - Out of bed for toileting  - Record patient progress and toleration of activity level   Outcome: Progressing     Problem: SAFETY,RESTRAINT: NV/NON-SELF DESTRUCTIVE BEHAVIOR  Goal: Remains free of harm/injury (restraint for non violent/non self-detsructive behavior)  Description: INTERVENTIONS:  - Instruct patient/family regarding restraint use   - Assess and monitor physiologic and psychological status   - Provide interventions and comfort measures to meet assessed patient needs   - Identify and implement measures to help patient regain control  - Assess readiness for release of restraint   Outcome: Progressing  Goal: Returns to optimal restraint-free functioning  Description: INTERVENTIONS:  - Assess the patient's behavior and symptoms that indicate continued need for restraint  - Identify and implement measures to help patient regain control  - Assess readiness for release of restraint   Outcome: Progressing     Problem: Nutrition/Hydration-ADULT  Goal: Nutrient/Hydration intake appropriate for improving, restoring or maintaining nutritional needs  Description: Monitor and assess patient's nutrition/hydration status for malnutrition  Collaborate with interdisciplinary team and initiate plan and interventions as ordered  Monitor patient's weight and dietary intake as ordered or per policy  Utilize nutrition screening tool and intervene as necessary  Determine patient's food preferences and provide high-protein, high-caloric foods as appropriate  INTERVENTIONS:  - Monitor oral intake, urinary output, labs, and treatment plans  - Assess nutrition and hydration status and recommend course of action  - Evaluate amount of meals eaten  - Assist patient with eating if necessary   - Allow adequate time for meals  - Recommend/ encourage appropriate diets, oral nutritional supplements, and vitamin/mineral supplements  - Order, calculate, and assess calorie counts as needed  - Recommend, monitor, and adjust tube feedings and TPN/PPN based on assessed needs  - Assess need for intravenous fluids  - Provide specific nutrition/hydration education as appropriate  - Include patient/family/caregiver in decisions related to nutrition  Outcome: Progressing     Problem: CONFUSION/THOUGHT DISTURBANCE  Goal: Thought disturbances (confusion, delirium, depression, dementia or psychosis) are managed to maintain or return to baseline mental status and functional level  Description: INTERVENTIONS:  - Assess for possible contributors to  thought disturbance, including but not limited to medications, infection, impaired vision or hearing, underlying metabolic abnormalities, dehydration, respiratory compromise,  psychiatric diagnoses and notify attending PHYSICAN/AP  - Monitor and intervene to maintain adequate nutrition, hydration, elimination, sleep and activity  - Decrease environmental stimuli, including noise as appropriate    - Provide frequent contacts to provide refocusing, direction and reassurance as needed  Approach patient calmly with eye contact and at their level  - [place] high risk fall precautions, aspiration precautions and other safety measures, as indicated  - If delirium suspected, notify physician/AP of change in condition and request immediate in-person evaluation  - Pursue consults as appropriate including Geriatric (campus dependent), OT for cognitive evaluation/activity planning, psychiatric, pastoral care, etc   Outcome: Progressing     Problem: BEHAVIOR  Goal: Pt/Family maintain appropriate behavior and adhere to behavioral management agreement, if implemented  Description: INTERVENTIONS:  - Assess the family dynamic   - Encourage verbalization of thoughts and concerns in a socially appropriate manner  - Assess patient/family's coping skills and non-compliant behavior (including use of illegal substances)  - Utilize positive, consistent limit setting strategies supporting safety of patient, staff and others  - Initiate consult with Case Management, Spiritual Care or other ancillary services as appropriate  - If a patient's/visitor's behavior jeopardizes the safety of the patient, staff, or others, refer to organization procedure     - Notify Security of behavior or suspected illegal substances which indicate the need for search of the patient and/or belongings  - Encourage participation in the decision making process about a behavioral management agreement; implement if patient meets criteria  Outcome: Progressing     Problem: ABUSE/NEGLECT  Goal: Pt/Caregiver/Family aware of resources to assist with issues of abuse and neglect  Description: INTERVENTIONS:  - If child abuse and/or neglect is suspected, notify Childline directly  - Assess for level of risk and safety  - Initiate referral to Case Management  - Notify PHYSICIAN/AP and Nursing Supervisor  - Provide appropriate education and resources to patient and/or family  - Initiate referral to age appropriate protective services, i e  [facility] or Child Protective Services  - Offer patient/caregiver the option to [facility] patient [employer]  - Provide emotional support, including active listening and acknowledgment of concerns  - Provide the patient with information about supportive services i e  shelters, community resources for domestic violence  Outcome: Progressing

## 2022-11-21 NOTE — ASSESSMENT & PLAN NOTE
· Per the patient's significant other, the patient has been having increased lethargy/confusion/generalized weakness since 11/19 when the patient slid off the chair and had to be helped back to bed by EMS  On 11/20 the home care staff called 911 due to patient having slurred speech and barely opening eyes  · 11/19 CT Head: no intracranial mass, mass effect or midline shift  No CT signs of acute infarction  No acute parenchymal hemorrhage    · 11/20 ABG: pH 7 1 pCO2 129 6 pO2 83 1 HCO3 39 6  · Placed on bipap but not tolerating  · CPAP settings increased from 19/6 to 25/12  · 11/21 Repeat ABG: pH 7 25 pCO2 66 6 pO2 73 8 HCO3 28 9  · Ammonia 66   · Blood glucose at baseline    Plan  · Repeat ammonia level now  · Repeat CMP in AM  · Repeat ABG in AM

## 2022-11-21 NOTE — RESPIRATORY THERAPY NOTE
ABG drawn on 401 % Bipap  [employer]'s test preformed  ABG drawn from LRA  Patient tolerated well  Specimen labeled and sent sent to lab for processing

## 2022-11-21 NOTE — ASSESSMENT & PLAN NOTE
Lab Results   Component Value Date    HGBA1C 7 0 (H) 11/01/2022       Recent Labs     11/20/22  1408   POCGLU 126       Blood Sugar Average: Last 72 hrs:  · Patient refused insulin while inpatient on 11/16-11/17, stating she wants to confirm with PCP prior to starting diabetes treatment  · NPO while on BIPAP    Plan  · SSI if allowed by patient  · Goal 140-180

## 2022-11-21 NOTE — ASSESSMENT & PLAN NOTE
Small bilateral pleural effusions noted on CT chest    Updated Echo 11/16/22: Technically difficult study  Left ventricular cavity size is normal  Wall thickness is mildly increased  There is concentric remodeling  Wall motion and left ventricular ejection fraction cannot be accurately assessed  Left ventricular systolic function appears normal in limited views      Start lasix 40 IV bid for now and reassess

## 2022-11-21 NOTE — ASSESSMENT & PLAN NOTE
· Baseline oxygen 4L nasal cannula  · Non compliant with CPAP at home  · 11/20 ABG: pH 7 1 pCO2 129 6 pO2 83 1 HCO3 39 6  · Placed on bipap but not tolerating  · CPAP settings increased from 19/6 to 25/12  · 11/21 Repeat ABG: pH 7 25 pCO2 66 6 pO2 73 8 HCO3 28 9    Plan  · Wean oxygen as tolerated  · Respiratory protocol  · Atrovent/Xopenex TID  · Repeat ABG now

## 2022-11-21 NOTE — RESPIRATORY THERAPY NOTE
RT Protocol Note  Scott Jean 60 y o  female MRN: [id number]  Unit/Bed#: ICU 06 Encounter: [id number]    Assessment    Active Problems:    Chronic kidney disease, stage III (moderate) (HCC)    Dyslipidemia    Paroxysmal atrial fibrillation (HCC)    COPD with asthma (HCC)    Hypothyroid    GERD (gastroesophageal reflux disease)    Anxiety    Morbid (severe) obesity due to excess calories (HCC)    Diabetes mellitus type 2 in obese (HCC)    Chronic respiratory failure with hypoxia (HCC)    Leg wound, left, initial encounter    Pneumonia due to infectious organism    Acute metabolic encephalopathy    Acute on chronic respiratory failure with hypoxia (HCC)    Panniculitis      Home Pulmonary Medications:  Albuterol and Atrovent  Home Devices/Therapy: Home O2 (Nebulizers)    Past Medical History:   Diagnosis Date   • Anemia    • Arthritis    • Cancer of kidney (HCC)    • Chronic kidney disease    • Diabetes mellitus ([facility] )    • Disease of thyroid gland    • HLD (hyperlipidemia)    • Hypertension    • Ovarian cancer ([facility] )    • Pneumonia      Social History     Socioeconomic History   • Marital status: Single     Spouse name: Not on file   • Number of children: 0   • Years of education: Not on file   • Highest education level: 12th grade   Occupational History   • Not on file   Tobacco Use   • Smoking status: Former     Packs/day: 3 00     Years: 30 00     Pack years: 90 00     Types: Cigarettes     Quit date: 10/22/2010     Years since quittin 0   • Smokeless tobacco: Former     Quit date: 2011   • Tobacco comments:     quit approx   9 years ago   Vaping Use   • Vaping Use: Never used   Substance and Sexual Activity   • Alcohol use: Never     Comment: n/a   • Drug use: Yes     Types: Marijuana     Comment: smokes with girlfriend when anxious   • Sexual activity: Yes     Partners: Female   Other Topics Concern   • Not on file   Social History Narrative    · Most recent tobacco use screenin2020 · Do you currently or have you served in the [employer]:   No      · Were you activated, into active duty, as a member of the [email or web address] or as a Reservist:   No      ·     Last modified by bertha   02-, 10:39      Social Determinants of Health     Financial Resource Strain: Not on file   Food Insecurity: No Food Insecurity   • Worried About [address] in the Last Year: Never true   • Ran Out of Food in the Last Year: Never true   Transportation Needs: No Transportation Needs   • Lack of Transportation (Medical): No   • Lack of Transportation (Non-Medical): No   Physical Activity: Not on file   Stress: Not on file   Social Connections: Not on file   Intimate Partner Violence: Not on file   Housing Stability: Unknown   • Unable to Pay for Housing in the Last Year: No   • Number of Places Lived in the Last Year: Not on file   • Unstable Housing in the Last Year: No       Subjective         Objective    Physical Exam:   Assessment Type: During-treatment  General Appearance: Eyes do not open to any stimulus  Respiratory Pattern: Assisted  Chest Assessment: Chest expansion symmetrical  Bilateral Breath Sounds: Diminished  Cough: None  O2 Device: Bipap    Vitals:  Blood pressure 133/78, pulse (!) 108, temperature 99 °F (37 2 °C), temperature source Axillary, resp  rate 15, height 5' 4" (1 626 m), weight (!) 153 kg (337 lb 15 4 oz), SpO2 98 %, not currently breastfeeding  Results from last 7 days   Lab Units 11/21/22  0905   PH ART  7 256*   PCO2 ART mm Hg 66 6*   PO2 ART mm Hg 73 8*   HCO3 ART mmol/L 28 9*   BASE EXC ART mmol/L 1 1   O2 CONTENT ART mL/dL 11 7*   O2 HGB, ARTERIAL % 92 4*   ABG SOURCE  Radial, Left   JOHN TEST  Yes       Imaging and other studies: I have personally reviewed pertinent reports  O2 Device: Bipap     Plan    Respiratory Plan:  Moderate/Severe Distress pathway, Home Bronchodilator Patient pathway  Airway Clearance Plan:  (Pt on Continuous Bipap)     Resp Comments: Pt on Continuous Bipap at the this time  Drowsy and not very responsive  Pt on 3 LPM at home and uses Nebulized Albuterol and Atrovent  IS to be used once off bipap        11/21/22 1305   Respiratory Protocol   Protocol Initiated? Yes   Protocol Selection Airway Clearance; Respiratory   Language Barrier? Yes  (Bipap)   Medical & Social History Reviewed? Yes   Diagnostic Studies Reviewed? Yes   Physical Assessment Performed? Yes   Home Devices/Therapy Home O2  (Nebulizers)   Airway Clearance Plan   (Pt on Continuous Bipap)   Respiratory Plan Moderate/Severe Distress pathway;Home Bronchodilator Patient pathway   Respiratory Assessment   Assessment Type During-treatment   General Appearance Eyes do not open to any stimulus   Respiratory Pattern Assisted   Chest Assessment Chest expansion symmetrical   Bilateral Breath Sounds Diminished   Cough None   Resp Comments Pt on Continuous Bipap at the this time  Drowsy and not very responsive   Pt on 3 LPM at home and uses Nebulized Albuterol and Atrovent   O2 Device Bipap   Additional Assessments   Pulse (!) 108   Respirations 15   SpO2 98 %

## 2022-11-21 NOTE — CASE COMMUNICATION
11  20 22  Pt admitted to [facility] with acute on chronic respiratory failure with hypoxia and acute panniculitis  11 21 22 Pt transferred to [facility]

## 2022-11-21 NOTE — PHYSICAL THERAPY NOTE
Physical Therapy Cancellation Note     11/21/22 0918   PT Last Visit   PT Visit Date 11/21/22   Note Type   Note type Cancelled Session   Cancel Reasons Medical status   Additional Comments PT consult received chart reviewed  Patient planned for [facility] for higher level care/critical care needs  Will hold PT services at this time       Zoe Shankar PT, DPT   Available via [employer]  NP # [id number]  [place] License - [id number]  [id number]

## 2022-11-21 NOTE — PROCEDURES
Arterial Line Insertion    Date/Time: 11/21/2022 5:01 PM  Performed by: Sumeet Quach, [address] by: Sumeet Quach, [address] [place] [address]     Patient location:  ICU and bedside  Other Assisting Provider: Yes (comment) (Phylicia Patterson)    Consent:     Consent obtained:  Verbal    Consent given by:  Healthcare agent    Risks discussed:  Bleeding, ischemia, repeat procedure, pain and infection  Universal protocol:     Procedure explained and questions answered to patient or proxy's satisfaction: yes      Relevant documents present and verified: yes      Test results available and properly labeled: yes      Radiology Images displayed and confirmed  If images not available, report reviewed: yes      Required blood products, implants, devices, and special equipment available: no      Site/side marked: yes      Immediately prior to procedure a time out was called: yes      Patient identity confirmed:  Arm band and hospital-assigned identification number (verbally with patient's POA)  Indications:     Indications: multiple ABGs and frequent labs / infusion    Pre-procedure details:     Skin preparation:  Chlorhexidine    Preparation: Patient was prepped and draped in sterile fashion    Sedation:     Sedation type: Anxiolysis  Anesthesia (see MAR for exact dosages): Anesthesia method:  Local infiltration    Local anesthetic:  Lidocaine 1% w/o epi  Procedure details:     Location / Tip of Catheter:  Radial    Laterality:  Left    Kodak's test performed: yes      Needle gauge:  20 G    Placement technique:  Ultrasound guided    Ultrasound image availability:  Not saved    Sterile ultrasound techniques: Sterile gel and sterile probe covers were used      Number of attempts:  3    Successful placement: yes      Transducer: waveform confirmed    Post-procedure details:     Post-procedure:  Sterile dressing applied, sutured and wrist guard applied    CMS:  Normal    Patient tolerance of procedure:   Tolerated well, no immediate complications

## 2022-11-21 NOTE — ASSESSMENT & PLAN NOTE
Lab Results   Component Value Date    EGFR 33 11/21/2022    EGFR 31 11/20/2022    EGFR 28 11/17/2022    CREATININE 1 63 (H) 11/21/2022    CREATININE 1 69 (H) 11/20/2022    CREATININE 1 87 (H) 11/17/2022     · Baseline Cr: 1 8 - 2 4  · Admission Cr: 1 63    Plan  · Avoid nephrotoxins  · Avoid hypotension  · Strict I&Os  · CMP in AM

## 2022-11-21 NOTE — ASSESSMENT & PLAN NOTE
Small bilateral pleural effusions noted on CT chest    Updated Echo 11/16/22: Technically difficult study  Left ventricular cavity size is normal  Wall thickness is mildly increased  There is concentric remodeling  Wall motion and left ventricular ejection fraction cannot be accurately assessed  Left ventricular systolic function appears normal in limited views      Continue lasix 40 IV bid for now and reassess

## 2022-11-21 NOTE — ASSESSMENT & PLAN NOTE
· Admit 11/16-11/17 for pneumonia and discharged on cefpodoxime 400mg BID for three days  · CTA C/A/P 11/20: Multifocal scattered patchy nodular opacities in right upper and possibly right lower lobes is degraded by motion artifact, suspicious for infection/inflammation or aspiration  Difficult to exclude underlying pulmonary nodules  Small bilateral pleural effusions with multifocal subsegmental atelectasis    · No leukocytosis  · Procal negative  · Lactic acid 0 5  · Afebrile  · Legionella/Stept negative  · Ceftriaxone Day 2    Plan  · Repeat CBC in AM  · Continue Ceftriaxone  · Trend fever curve  · Follow up blood cultures  · ID consulted, input appreciated

## 2022-11-21 NOTE — ASSESSMENT & PLAN NOTE
Lab Results   Component Value Date    EGFR 33 11/21/2022    EGFR 31 11/20/2022    EGFR 28 11/17/2022    CREATININE 1 63 (H) 11/21/2022    CREATININE 1 69 (H) 11/20/2022    CREATININE 1 87 (H) 11/17/2022     Per Nephrology last note in August 2022:  new baseline appears to be from 1 8-as high as 2 4  Continue to monitor Cr

## 2022-11-21 NOTE — PROGRESS NOTES
[facility]  Progress Note - Eve Quinn 1960, 60 y o  female MRN: [id number]  Unit/Bed#: [facility] Encounter: [id number]  Primary Care Provider: Ernestina Anders MD   Date and time admitted to hospital: 11/20/2022  2:04 PM    * Acute metabolic encephalopathy  Assessment & Plan  The patient's significant other admits that patient has been having increased lethargy/confusion/generalized weakness since last evening when she slid down off the chair and had to call EMS to help patient get back to bed  Patient has had increased confusion and lethargy since the evening prior to presentation    CT head - No acute intracranial abnormality  CBC and CMP essentially unchanged from day of discharge 11/17/22  TSH last admission wnl  Ammonia level 66  Patient has not been compliant with cpap at home   ABG on admission showed hypercapnic respiratory acidosis with Ph 7 1 and PCo2 129  Patient placed on bipap overnight  No pH or Co2 improvement in morning Vbg Pip/pep 19/6 PR16, FiO2 45%,  Discussed with Critical care reocommend increasing pressure support over peep to 25/12  Repeat ABG showing improvement with Ph  7 2, pCO2 66, O2 73 8, bicarb 28   Patient more alert trying to take off bipap  Ordered restraints avoid sedatives  Plan to transfer to [facility] without intubation      Abnormal CT scan  Assessment & Plan   CTA chest abd pelvis showing:    Multifocal scattered patchy nodular opacities in right upper and possibly right lower lobes is degraded by motion artifact, suspicious for infection/inflammation or aspiration  Difficult to exclude underlying pulmonary nodules  Recommend follow-up   chest CT in 8-12 weeks to ensure resolution       Small bilateral pleural effusions with multifocal subsegmental atelectasis      Acute panniculitis      Large right lower quadrant ventral abdominal hernia containing small bowel loops, proximal ascending colon, appendix, and mesenteric fat    No bowel obstruction      Small volume ascites in left hemiabdomen      Additional chronic/incidental findings as detailed above           Pneumonia due to infectious organism  Assessment & Plan  CT showing:   Multifocal scattered patchy nodular opacities in right upper and possibly right lower lobes is degraded by motion artifact, suspicious for infection/inflammation or aspiration  Difficult to exclude underlying pulmonary nodules  Recommend follow-up   chest CT in 8-12 weeks to ensure resolution  Speech consult for swallow eval  Mucinex, incentive spirometry, breathing treatments, sputum cultures ordered    Continue ceftriaxone day 2    Bilateral pleural effusion  Assessment & Plan  Small bilateral pleural effusions noted on CT chest    Updated Echo 11/16/22: Technically difficult study  Left ventricular cavity size is normal  Wall thickness is mildly increased  There is concentric remodeling  Wall motion and left ventricular ejection fraction cannot be accurately assessed  Left ventricular systolic function appears normal in limited views      Continue lasix 40 IV bid for now and reassess    Panniculus  Assessment & Plan  Procalcitonin negative, no leukocytosis  Continue ceftriaxone for pneumonia  ID consulted for evaluation given  no leukocytosis and negative procalcitonin    Diabetes mellitus type 2 in obese [facility])  Assessment & Plan  Lab Results   Component Value Date    HGBA1C 7 0 (H) 11/01/2022       Recent Labs     11/20/22  1408   POCGLU 126       Blood Sugar Average: Last 72 hrs:  (P) 126     Patient refused insulin while inpatient on last admission  States that they want to confirm with her PCP prior to starting any kind of diabetes treatment    Chronic kidney disease, stage III (moderate) [facility])  Assessment & Plan  Lab Results   Component Value Date    EGFR 33 11/21/2022    EGFR 31 11/20/2022    EGFR 28 11/17/2022    CREATININE 1 63 (H) 11/21/2022    CREATININE 1 69 (H) 11/20/2022    CREATININE 1 87 (H) 11/17/2022     Per Nephrology last note in August 2022:  new baseline appears to be from 1 8-as high as 2 4  Continue to monitor Cr    Acute on chronic respiratory failure with hypoxia (HCC)  Assessment & Plan  Acute and chronic respiratory failure with hypoxia, POA, due to PNA evidenced by pH 7 103; pCO2 129 6, respiratory distress; altered mental status treated with 6L NC then Bipap, ABG, CXR, nebulizers and monitoring of respiratory status            Leg wound, left, initial encounter  Assessment & Plan  Wound care consult    Chronic respiratory failure with hypoxia (HCC)  Assessment & Plan  On 4L NC at baseline  Titrate o2 88-92%  Currently on bipap for hypercapnic respiratory failure    GERD (gastroesophageal reflux disease)  Assessment & Plan  Resume Pepcid    Hypothyroid  Assessment & Plan  Resume home levothyroxine    COPD with asthma ([facility] [address] )  Assessment & Plan  Currently does not appear to be in COPD exacerbation  Continue home inhalers    Paroxysmal atrial fibrillation (HCC)  Assessment & Plan  Continue metoprolol 50 mg b i d  Resume home Coumadin  INR today 2 93    Dyslipidemia  Assessment & Plan  Resume statin        Medical Problems     Resolved Problems  Date Reviewed: 11/21/2022   None       Discharging Physician / Practitioner: Darby Ochoa DO  PCP: Saúl Estrada MD  Admission Date:   Admission Orders (From admission, onward)     Ordered        11/20/22 [address]  Once                      Discharge Date: 11/21/22    Consultations During Hospital Stay:  · ID    Procedures Performed:   · Abg    Significant Findings / Test Results:   · Hypercapnic respiratory acidosis  · Pneumonia and panniculitis on CT chest abdomen pelvis    Incidental Findings:   · See above   · I reviewed the above mentioned incidental findings with the patient and/or family and they expressed understanding  Test Results Pending at Discharge (will require follow up):    · Blood cultures     Outpatient Tests Requested:  · n/a    Complications:  n/a    Reason for Admission: Encephalopathy    Hospital Course:   Michelet Brenner is a 60 y o  female patient who originally presented to the hospital on 11/20/2022 due to increasing lethargy and confusion for 24 hours prior to presenting to the ER  CT head was negative for acute findings  CT chest abdomen pelvis done in the ER showed right upper lobe and possibly right lower lobe scattered patchy opacities suspicious for infection versus inflammation or aspiration  Also noted to have small bilateral pleural effusions, acute panniculitis, and large right lower quadrant ventral abdominal hernia  On admission ABG was obtained showing hypercapnic respiratory acidosis  Patient was placed on BiPAP overnight with no significant improvement in acidosis or CO2  Case was discussed with critical care early this morning at [facility] who recommended BiPAP changes (increasing inspiratory pressure to 25 and expiratory pressure to 12) man reassessing ABG to see whether patient requires intubation  Repeat ABG shows improvement in acidosis with a pH of 7 2 and improvement and CO2 down to 66  Discussed with critical care plan to transfer patient without intubation on BiPAP  Patient was also placed on Lasix 40 IV b i d  For the bilateral pleural effusions  Ceftriaxone was restarted for her recent pneumonia and an ID consult was placed given that patient has panniculus with normal WBC and negative procalcitonin  Please see above list of diagnoses and related plan for additional information  Condition at Discharge: serious    Discharge Day Visit / Exam:   Subjective:   The patient is confused/agitated on BiPAP unable to obtain history  Vitals: Blood Pressure: 104/55 (11/21/22 0810)  Pulse: 94 (11/21/22 0704)  Temperature: 97 5 °F (36 4 °C) (11/21/22 0704)  Temp Source: Tympanic (11/21/22 0704)  Respirations: 20 (11/21/22 0704)  Height: 5' 4" (162 6 cm) (11/20/22 1807)  Weight - Scale: (!) 160 kg (352 lb 11 8 oz) (11/21/22 0535)  SpO2: 96 % (11/21/22 0829)  Exam:   Physical Exam  Vitals reviewed  Constitutional:       Appearance: She is obese  She is ill-appearing  HENT:      Head: Normocephalic and atraumatic  Right Ear: External ear normal       Left Ear: External ear normal       Nose: Nose normal       Mouth/Throat:      Mouth: Mucous membranes are moist       Pharynx: Oropharynx is clear  Eyes:      Extraocular Movements: Extraocular movements intact  Cardiovascular:      Rate and Rhythm: Normal rate and regular rhythm  Heart sounds: Normal heart sounds  Pulmonary:      Comments: Decreased breath sounds bilaterally  Abdominal:      General: Abdomen is flat  Palpations: Abdomen is soft  Tenderness: There is no abdominal tenderness  Musculoskeletal:      Cervical back: Normal range of motion  Right lower leg: Edema present  Left lower leg: Edema present  Skin:     General: Skin is warm and dry  Neurological:      Mental Status: She is alert  She is disoriented  Discussion with Family: Updated  (significant other) at bedside  Discharge instructions/Information to patient and family:   See after visit summary for information provided to patient and family  Provisions for Follow-Up Care:  See after visit summary for information related to follow-up care and any pertinent home health orders  Disposition:   [address] Transfer to [address] [place] [address]    Planned Readmission: yes     Discharge Statement:  I spent 80 minutes discharging the patient  This time was spent on the day of discharge  I had direct contact with the patient on the day of discharge  Greater than 50% of the total time was spent examining patient, answering all patient questions, arranging and discussing plan of care with patient as well as directly providing post-discharge instructions    Additional time then spent on discharge activities  Discharge Medications:  See after visit summary for reconciled discharge medications provided to patient and/or family        **Please Note: This note may have been constructed using a voice recognition system**

## 2022-11-21 NOTE — ASSESSMENT & PLAN NOTE
Lab Results   Component Value Date    HGBA1C 7 0 (H) 11/01/2022       Recent Labs     11/20/22  1408   POCGLU 126       Blood Sugar Average: Last 72 hrs:  (P) 126     Patient refused insulin while inpatient on last admission  States that they want to confirm with her PCP prior to starting any kind of diabetes treatment

## 2022-11-21 NOTE — ASSESSMENT & PLAN NOTE
· Home regimen: metoprolol 50mg BID and Coumadin   · Current INR: 2 93    Plan:  · Check INR daily  · Resume home coumadin and metoprolol

## 2022-11-22 LAB
ALBUMIN SERPL BCP-MCNC: 2.8 G/DL (ref 3.5–5)
ALP SERPL-CCNC: 117 U/L (ref 34–104)
ALT SERPL W P-5'-P-CCNC: 7 U/L (ref 7–52)
ANION GAP SERPL CALCULATED.3IONS-SCNC: 6 MMOL/L (ref 4–13)
ANION GAP SERPL CALCULATED.3IONS-SCNC: 6 MMOL/L (ref 4–13)
AST SERPL W P-5'-P-CCNC: 11 U/L (ref 13–39)
ATRIAL RATE: 150 BPM
BASE EX.OXY STD BLDV CALC-SCNC: 44 % (ref 60–80)
BASE EXCESS BLDA CALC-SCNC: -5.6 MMOL/L
BASE EXCESS BLDA CALC-SCNC: 2.3 MMOL/L
BASE EXCESS BLDV CALC-SCNC: 6.9 MMOL/L
BILIRUB SERPL-MCNC: 0.21 MG/DL (ref 0.2–1)
BUN SERPL-MCNC: 28 MG/DL (ref 5–25)
BUN SERPL-MCNC: 31 MG/DL (ref 5–25)
CALCIUM ALBUM COR SERPL-MCNC: 9.6 MG/DL (ref 8.3–10.1)
CALCIUM SERPL-MCNC: 8.6 MG/DL (ref 8.4–10.2)
CALCIUM SERPL-MCNC: 9 MG/DL (ref 8.4–10.2)
CHLORIDE SERPL-SCNC: 101 MMOL/L (ref 96–108)
CHLORIDE SERPL-SCNC: 99 MMOL/L (ref 96–108)
CO2 SERPL-SCNC: 33 MMOL/L (ref 21–32)
CO2 SERPL-SCNC: 35 MMOL/L (ref 21–32)
CREAT SERPL-MCNC: 1.62 MG/DL (ref 0.6–1.3)
CREAT SERPL-MCNC: 1.69 MG/DL (ref 0.6–1.3)
ERYTHROCYTE [DISTWIDTH] IN BLOOD BY AUTOMATED COUNT: 16.7 % (ref 11.6–15.1)
GFR SERPL CREATININE-BSD FRML MDRD: 31 ML/MIN/1.73SQ M
GFR SERPL CREATININE-BSD FRML MDRD: 33 ML/MIN/1.73SQ M
GLUCOSE SERPL-MCNC: 143 MG/DL (ref 65–140)
GLUCOSE SERPL-MCNC: 151 MG/DL (ref 65–140)
GLUCOSE SERPL-MCNC: 173 MG/DL (ref 65–140)
HCO3 BLDA-SCNC: 19.7 MMOL/L (ref 22–28)
HCO3 BLDA-SCNC: 28.7 MMOL/L (ref 22–28)
HCO3 BLDV-SCNC: 35.4 MMOL/L (ref 24–30)
HCT VFR BLD AUTO: 28.6 % (ref 34.8–46.1)
HGB BLD-MCNC: 8 G/DL (ref 11.5–15.4)
INR PPP: 2.78 (ref 0.84–1.19)
IPAP: 25
LACTATE SERPL-SCNC: 1.9 MMOL/L (ref 0.5–2)
MAGNESIUM SERPL-MCNC: 1.7 MG/DL (ref 1.9–2.7)
MCH RBC QN AUTO: 30.1 PG (ref 26.8–34.3)
MCHC RBC AUTO-ENTMCNC: 28 G/DL (ref 31.4–37.4)
MCV RBC AUTO: 108 FL (ref 82–98)
NASAL CANNULA: 2
NON VENT- BIPAP: ABNORMAL
O2 CT BLDA-SCNC: 10.7 ML/DL (ref 16–23)
O2 CT BLDA-SCNC: 8.6 ML/DL (ref 16–23)
O2 CT BLDV-SCNC: 6 ML/DL
OXYHGB MFR BLDA: 95.8 % (ref 94–97)
OXYHGB MFR BLDA: 97.1 % (ref 94–97)
PCO2 BLDA: 37.2 MM HG (ref 36–44)
PCO2 BLDA: 55.7 MM HG (ref 36–44)
PCO2 BLDV: 78 MM HG (ref 42–50)
PEEP MAX SETTING VENT: 12 CM[H2O]
PH BLDA: 7.33 [PH] (ref 7.35–7.45)
PH BLDA: 7.34 [PH] (ref 7.35–7.45)
PH BLDV: 7.28 [PH] (ref 7.3–7.4)
PHOSPHATE SERPL-MCNC: 3.4 MG/DL (ref 2.3–4.1)
PLATELET # BLD AUTO: 239 THOUSANDS/UL (ref 149–390)
PMV BLD AUTO: 10.3 FL (ref 8.9–12.7)
PO2 BLDA: 142.7 MM HG (ref 75–129)
PO2 BLDA: 95.4 MM HG (ref 75–129)
PO2 BLDV: 28.2 MM HG (ref 35–45)
POTASSIUM SERPL-SCNC: 5 MMOL/L (ref 3.5–5.3)
POTASSIUM SERPL-SCNC: 5.1 MMOL/L (ref 3.5–5.3)
PROT SERPL-MCNC: 6.7 G/DL (ref 6.4–8.4)
PROTHROMBIN TIME: 29.6 SECONDS (ref 11.6–14.5)
QRS AXIS: 83 DEGREES
QRSD INTERVAL: 66 MS
QT INTERVAL: 284 MS
QTC INTERVAL: 392 MS
RBC # BLD AUTO: 2.66 MILLION/UL (ref 3.81–5.12)
SODIUM SERPL-SCNC: 140 MMOL/L (ref 135–147)
SODIUM SERPL-SCNC: 140 MMOL/L (ref 135–147)
SPECIMEN SOURCE: ABNORMAL
SPECIMEN SOURCE: ABNORMAL
T WAVE AXIS: 182 DEGREES
VENT BIPAP FIO2: 40 %
VENTRICULAR RATE: 115 BPM
WBC # BLD AUTO: 4.95 THOUSAND/UL (ref 4.31–10.16)

## 2022-11-22 RX ORDER — PREDNISONE 20 MG/1
40 TABLET ORAL DAILY
Status: DISCONTINUED | OUTPATIENT
Start: 2022-11-23 | End: 2022-11-23

## 2022-11-22 RX ORDER — METHYLPREDNISOLONE SODIUM SUCCINATE 40 MG/ML
40 INJECTION, POWDER, LYOPHILIZED, FOR SOLUTION INTRAMUSCULAR; INTRAVENOUS EVERY 12 HOURS SCHEDULED
Status: DISCONTINUED | OUTPATIENT
Start: 2022-11-22 | End: 2022-11-22

## 2022-11-22 RX ORDER — LANOLIN ALCOHOL/MO/W.PET/CERES
6 CREAM (GRAM) TOPICAL
Status: DISCONTINUED | OUTPATIENT
Start: 2022-11-22 | End: 2022-12-03 | Stop reason: HOSPADM

## 2022-11-22 RX ORDER — HALOPERIDOL 5 MG/ML
5 INJECTION INTRAMUSCULAR ONCE
Status: COMPLETED | OUTPATIENT
Start: 2022-11-22 | End: 2022-11-22

## 2022-11-22 RX ORDER — PREDNISONE 20 MG/1
20 TABLET ORAL DAILY
Status: DISCONTINUED | OUTPATIENT
Start: 2022-11-29 | End: 2022-11-23

## 2022-11-22 RX ORDER — MAGNESIUM SULFATE HEPTAHYDRATE 40 MG/ML
2 INJECTION, SOLUTION INTRAVENOUS ONCE
Status: COMPLETED | OUTPATIENT
Start: 2022-11-22 | End: 2022-11-22

## 2022-11-22 RX ORDER — FUROSEMIDE 10 MG/ML
40 INJECTION INTRAMUSCULAR; INTRAVENOUS ONCE
Status: COMPLETED | OUTPATIENT
Start: 2022-11-22 | End: 2022-11-22

## 2022-11-22 RX ORDER — PREDNISONE 10 MG/1
10 TABLET ORAL DAILY
Status: DISCONTINUED | OUTPATIENT
Start: 2022-12-02 | End: 2022-11-23

## 2022-11-22 RX ORDER — FORMOTEROL FUMARATE 20 UG/2ML
20 SOLUTION RESPIRATORY (INHALATION)
Status: DISCONTINUED | OUTPATIENT
Start: 2022-11-22 | End: 2022-12-03 | Stop reason: HOSPADM

## 2022-11-22 RX ORDER — BUDESONIDE 0.5 MG/2ML
0.5 INHALANT ORAL
Status: DISCONTINUED | OUTPATIENT
Start: 2022-11-22 | End: 2022-12-03 | Stop reason: HOSPADM

## 2022-11-22 RX ADMIN — GUAIFENESIN 1200 MG: 600 TABLET, EXTENDED RELEASE ORAL at 17:26

## 2022-11-22 RX ADMIN — LEVALBUTEROL HYDROCHLORIDE 1.25 MG: 1.25 SOLUTION, CONCENTRATE RESPIRATORY (INHALATION) at 13:09

## 2022-11-22 RX ADMIN — IPRATROPIUM BROMIDE 0.5 MG: 0.5 SOLUTION RESPIRATORY (INHALATION) at 19:28

## 2022-11-22 RX ADMIN — IPRATROPIUM BROMIDE 0.5 MG: 0.5 SOLUTION RESPIRATORY (INHALATION) at 07:16

## 2022-11-22 RX ADMIN — FUROSEMIDE 40 MG: 10 INJECTION, SOLUTION INTRAMUSCULAR; INTRAVENOUS at 11:49

## 2022-11-22 RX ADMIN — FORMOTEROL FUMARATE DIHYDRATE 20 MCG: 20 SOLUTION RESPIRATORY (INHALATION) at 11:01

## 2022-11-22 RX ADMIN — BUDESONIDE 0.5 MG: 0.5 INHALANT ORAL at 11:01

## 2022-11-22 RX ADMIN — DEXMEDETOMIDINE HYDROCHLORIDE 1.2 MCG/KG/HR: 4 INJECTION, SOLUTION INTRAVENOUS at 06:33

## 2022-11-22 RX ADMIN — HALOPERIDOL LACTATE 5 MG: 5 INJECTION, SOLUTION INTRAMUSCULAR at 02:55

## 2022-11-22 RX ADMIN — LEVALBUTEROL HYDROCHLORIDE 1.25 MG: 1.25 SOLUTION, CONCENTRATE RESPIRATORY (INHALATION) at 19:28

## 2022-11-22 RX ADMIN — IPRATROPIUM BROMIDE 0.5 MG: 0.5 SOLUTION RESPIRATORY (INHALATION) at 13:09

## 2022-11-22 RX ADMIN — FORMOTEROL FUMARATE DIHYDRATE 20 MCG: 20 SOLUTION RESPIRATORY (INHALATION) at 19:28

## 2022-11-22 RX ADMIN — MAGNESIUM SULFATE HEPTAHYDRATE 2 G: 40 INJECTION, SOLUTION INTRAVENOUS at 09:29

## 2022-11-22 RX ADMIN — HYDROXYZINE HYDROCHLORIDE 25 MG: 25 TABLET ORAL at 16:09

## 2022-11-22 RX ADMIN — METHYLPREDNISOLONE SODIUM SUCCINATE 40 MG: 40 INJECTION, POWDER, FOR SOLUTION INTRAMUSCULAR; INTRAVENOUS at 05:03

## 2022-11-22 RX ADMIN — COLLAGENASE SANTYL: 250 OINTMENT TOPICAL at 09:29

## 2022-11-22 RX ADMIN — Medication 6 MG: at 21:00

## 2022-11-22 RX ADMIN — BUDESONIDE 0.5 MG: 0.5 INHALANT ORAL at 19:28

## 2022-11-22 RX ADMIN — DEXMEDETOMIDINE HYDROCHLORIDE 0.2 MCG/KG/HR: 4 INJECTION, SOLUTION INTRAVENOUS at 20:51

## 2022-11-22 RX ADMIN — DEXMEDETOMIDINE HYDROCHLORIDE 1.2 MCG/KG/HR: 4 INJECTION, SOLUTION INTRAVENOUS at 04:30

## 2022-11-22 RX ADMIN — LEVALBUTEROL HYDROCHLORIDE 1.25 MG: 1.25 SOLUTION, CONCENTRATE RESPIRATORY (INHALATION) at 07:15

## 2022-11-22 RX ADMIN — METOPROLOL TARTRATE 50 MG: 50 TABLET, FILM COATED ORAL at 20:15

## 2022-11-22 NOTE — CASE MANAGEMENT
Case Management Progress Note    Patient name Bea Metz  Location ICU 06/ICU 06 MRN [id number]  : 1960 Date 2022       LOS (days): 1  Geometric Mean LOS (GMLOS) (days): 3 50  Days to GMLOS:2 3        OBJECTIVE:        Current admission status: Inpatient  [facility]   [facility], [address] [place] [address],[address]  [address] [place] [address]  Phone: [phone] Fax: [phone]    Primary Care Provider: Rosa Alberto MD    Primary Insurance: MEDICARE  Secondary Insurance: [address] [place] [address]    PROGRESS NOTE:    CM left VM for pt emergency contact, Aracely Cintron, requesting return call

## 2022-11-22 NOTE — ASSESSMENT & PLAN NOTE
· Per the patient's significant other, the patient has been having increased lethargy/confusion/generalized weakness since 11/19 when the patient slid off the chair and had to be helped back to bed by EMS  On 11/20 the home care staff called 911 due to patient having slurred speech and barely opening eyes  · 11/19 CT Head: no intracranial mass, mass effect or midline shift  No CT signs of acute infarction  No acute parenchymal hemorrhage    · 11/20 ABG: pH 7 1 pCO2 129 6 pO2 83 1 HCO3 39 6  · Placed on bipap but not tolerating  · CPAP settings increased from 19/6 to 25/12  · 11/21 Repeat ABG: pH 7 25 pCO2 66 6 pO2 73 8 HCO3 28 9  · 11/22 AGB: pH 7 33 pCO2 55 7 pO2 142 7 HCO3 28 7  · Ammonia 66-->54   · Blood glucose at baseline    Plan  · Repeat CMP in AM  · Trial off Bipap  · Repeat ABG as needed

## 2022-11-22 NOTE — PROGRESS NOTES
[facility]  Progress Note - Erum Bonilla 1960, 60 y o  female MRN: [id number]  Unit/Bed#: ICU 06 Encounter: [id number]  Primary Care Provider: Taye Ford MD   Date and time admitted to hospital: 11/21/2022 12:58 PM    * Hypercapnic respiratory failure ([facility] [address] )  Assessment & Plan  · Baseline oxygen 4L nasal cannula  · Non compliant with CPAP at home  · 11/20 ABG: pH 7 1 pCO2 129 6 pO2 83 1 HCO3 39 6  · Placed on bipap but not tolerating  · CPAP settings increased from 19/6 to 25/12  · 11/21 Repeat ABG: pH 7 25 pCO2 66 6 pO2 73 8 HCO3 28 9  · 11/22 ABG pH 7 33 pCO2 55 7 pO2 142 7 HCO3 28 7    Plan  · Trial off Bipap  · Respiratory protocol  · Atrovent/Xopenex TID  · Repeat ABG as needed    Acute metabolic encephalopathy  Assessment & Plan  · Per the patient's significant other, the patient has been having increased lethargy/confusion/generalized weakness since 11/19 when the patient slid off the chair and had to be helped back to bed by EMS  On 11/20 the home care staff called 911 due to patient having slurred speech and barely opening eyes  · 11/19 CT Head: no intracranial mass, mass effect or midline shift  No CT signs of acute infarction  No acute parenchymal hemorrhage  · 11/20 ABG: pH 7 1 pCO2 129 6 pO2 83 1 HCO3 39 6  · Placed on bipap but not tolerating  · CPAP settings increased from 19/6 to 25/12  · 11/21 Repeat ABG: pH 7 25 pCO2 66 6 pO2 73 8 HCO3 28 9  · 11/22 AGB: pH 7 33 pCO2 55 7 pO2 142 7 HCO3 28 7  · Ammonia 66-->54   · Blood glucose at baseline    Plan  · Repeat CMP in AM  · Trial off Bipap  · Repeat ABG as needed    Pneumonia due to infectious organism  Assessment & Plan  · Admit 11/16-11/17 for pneumonia and discharged on cefpodoxime 400mg BID for three days  · CTA C/A/P 11/20: Multifocal scattered patchy nodular opacities in right upper and possibly right lower lobes is degraded by motion artifact, suspicious for infection/inflammation or aspiration  Difficult to exclude underlying pulmonary nodules  Small bilateral pleural effusions with multifocal subsegmental atelectasis  · No leukocytosis  · Procal negative  · Lactic acid 0 5  · Afebrile  · Legionella/Stept - negative  · Blood cultures - negative x24H  · Ceftriaxone Day 3    Plan  · Repeat CBC in AM  · Considered discontinuing ceftriaxone   · Trend fever curve  · Follow up blood cultures    Panniculitis  Assessment & Plan  · 11/20 CT C/A/P: acute panniculitis  · No leukocytosis      Bilateral pleural effusion  Assessment & Plan  · 11/20 CTA C/A/P - small b/l pleural effusions  · 11/16 ECHO - Left ventricular cavity size is normal  Wall thickness is mildly increased  There is concentric remodeling  Wall motion and left ventricular ejection fraction cannot be accurately assessed  Left ventricular systolic function appears normal in limited views    · 11/21 - Received 40mg IV lasix x1 dose    Plan  · Diuresis as needed    Leg wound, left, initial encounter  Assessment & Plan  · Patient came to hospital with multiple wounds    Plan  · Wound care consulted, input appreciated    Diabetes mellitus type 2 in [facility])  Assessment & Plan  Lab Results   Component Value Date    HGBA1C 7 0 (H) 11/01/2022       Recent Labs     11/20/22  1408   POCGLU 126       Blood Sugar Average: Last 72 hrs:  · Patient refused insulin while inpatient on 11/16-11/17, stating she wants to confirm with PCP prior to starting diabetes treatment  · NPO while on BIPAP    Plan  · Accu checks q6hrs      Morbid (severe) obesity due to excess calories (HCC)  Assessment & Plan  · BMI 66 5    Plan  · Encourage healthy diet and activity     Anxiety  Assessment & Plan  · Home regimen: 0 5mg ativan  · 11/22 - 5mg Haldol and Precedex gtt started 2/2 agitation, anxiety and patient pulling of Bipap    Plan  · Atarax 25mg q6H PRN  · Wean Precedex gtt as tolerated    GERD (gastroesophageal reflux disease)  Assessment & Plan  · Home regimen: Pepcid 20mg daily    Plan  · Continue Pepcid    Hypothyroid  Assessment & Plan  · Home regimen: levothyroxine 100mg daily    Plan  · Continue levothyroxine    COPD with asthma ([address] )  Assessment & Plan  · Home regimen: albuterol inhaler and nebulizer  · Baseline: 4L oxygen nasal cannula  · No PFTs noted in chart  · 11/21 - 125mg solumedrol x1 and then 40mg solumedrol q8H    Plan  · Atroven/Xopenex TID  · Continue steroids    Paroxysmal atrial fibrillation (HCC)  Assessment & Plan  · Home regimen: metoprolol 50mg BID and Coumadin   · Current INR: 2 78    Plan:  · Check INR daily  · Resume home coumadin and metoprolol      Dyslipidemia  Assessment & Plan  · Home regimen: Lipitor 10mg daily    Plan  · Continue lipitor    Chronic kidney disease, stage III (moderate) (HCC)  Assessment & Plan  Lab Results   Component Value Date    EGFR 31 11/22/2022    EGFR 33 11/21/2022    EGFR 31 11/20/2022    CREATININE 1 69 (H) 11/22/2022    CREATININE 1 63 (H) 11/21/2022    CREATININE 1 69 (H) 11/20/2022     · Baseline Cr: 1 8 - 2 4  · Admission Cr: 1 63  · Current Crt 1 69    Plan  · Avoid nephrotoxins  · Avoid hypotension  · Strict I&Os  · CMP in AM        ----------------------------------------------------------------------------------------  HPI: 60 y o  female who presents from [place] [address] on 11/20 with hypercapnic respiratory failure and altered mental status  The patient has a PMH of COPD, SASHA not compliant with CPAP, CKD stage 3, DM2, GERD and hypothyroidism  Her significant other states the patient has been having increased lethargy/confusion/weakness since 11/19 when the patient slid off the chair and had to be helped back to bed by EMS  At this time the patient/family refused to go to the hospital  On the morning of 11/20 the patient's home care RN called 911 due to patient having slurred speech and barely opening eyes  ABG pH 7 1 and pCO2 129   Patient placed on BIPAP but not tolerating 2/2 altered mentals status and baseline history of anxiety  Patient was transferred to [facility] for closer monitoring and possible need for intubation  24hr events: Overnight the patient had periods of agitation and noncompliance, continuously removing her BIPAP despite reeducation and redirection by ICU staff  Patient given 5mg Haldol x1 and started on Precedex gtt  Patient responds to pain and occasionally bangs on the side rails but will not follow commands       Patient appropriate for transfer out of the ICU today?: No  Disposition: Continue Stepdown Level 1 level of care   Code Status: Level 1 - Full Code  ---------------------------------------------------------------------------------------  SUBJECTIVE  Deferred 2/2 mental status    Review of Systems   Unable to perform ROS: Mental status change     Review of systems was unable to be performed secondary to mental status  ---------------------------------------------------------------------------------------  OBJECTIVE    Vitals   Vitals:    22 0227 22 0252 22 0400 22 0716   BP: 122/65      BP Location: Right arm      Pulse: 98  75    Resp: (!) 23  16    Temp: 97 9 °F (36 6 °C)      TempSrc: Axillary      SpO2: 100% 93% 98% 99%   Weight: (!) 153 kg (336 lb 6 8 oz)      Height:         Temp (24hrs), Av 3 °F (36 8 °C), Min:97 9 °F (36 6 °C), Max:99 °F (37 2 °C)  Current: Temperature: 97 9 °F (36 6 °C)  Arterial Line BP: 114/57  Arterial Line MAP (mmHg): 76 mmHg    Respiratory:  SpO2: SpO2: 96 %, SpO2 Activity: SpO2 Activity: At Rest, SpO2 Device: O2 Device: (S) Nasal cannula       Invasive/non-invasive ventilation settings   Respiratory    Lab Data (Last 4 hours)       0404            pH, Arterial       7 330             pCO2, Arterial       55 7             pO2, Arterial       142 7             HCO3, Arterial       28 7             Base Excess, Arterial       2 3                  O2/Vent Data       252   Most Recent Non-Invasive Ventilation Mode BiPAP  BiPAP                  Physical Exam  Constitutional:       Appearance: She is obese  HENT:      Head: Normocephalic and atraumatic  Mouth/Throat:      Mouth: Mucous membranes are dry  Eyes:      Pupils: Pupils are equal, round, and reactive to light  Comments: Cataracts left eye   Cardiovascular:      Rate and Rhythm: Normal rate  Rhythm irregular  Pulses: Normal pulses  Heart sounds: No murmur heard  Pulmonary:      Effort: Pulmonary effort is normal       Breath sounds: Normal breath sounds  Abdominal:      Palpations: Abdomen is soft  Tenderness: There is no abdominal tenderness  Musculoskeletal:         General: Normal range of motion  Cervical back: Normal range of motion  Skin:     General: Skin is warm and dry  Capillary Refill: Capillary refill takes less than 2 seconds  Neurological:      Motor: Weakness present        Comments: Responds to painful stimuli, does no follow commands  +4 strength in b/l upper extremities  +2 strength in b/l lower extremities             Laboratory and Diagnostics:  Results from last 7 days   Lab Units 11/22/22  0405 11/21/22  0507 11/20/22  1432 11/17/22  0530 11/16/22  0404   WBC Thousand/uL 4 95 7 37 7 40 7 02 9 83   HEMOGLOBIN g/dL 8 0* 8 2* 9 0* 9 2* 8 8*   HEMATOCRIT % 28 6* 30 7* 32 7* 34 3* 31 8*   PLATELETS Thousands/uL 239 242 269 284 292   NEUTROS PCT %  --   --   --   --  77*   BANDS PCT %  --   --  4  --   --    MONOS PCT %  --   --   --   --  7   MONO PCT %  --   --  8  --   --      Results from last 7 days   Lab Units 11/22/22  0405 11/21/22  0507 11/20/22  1432 11/17/22  0530 11/16/22  0404   SODIUM mmol/L 140 140 139 137 136   POTASSIUM mmol/L 5 0 4 7 5 1 5 1 4 7   CHLORIDE mmol/L 101 101 101 100 99   CO2 mmol/L 33* 35* 37* 32 34*   ANION GAP mmol/L 6 4 1* 5 3*   BUN mg/dL 28* 21 20 25 25   CREATININE mg/dL 1 69* 1 63* 1 69* 1 87* 1 87*   CALCIUM mg/dL 8 6 8 9 9 1 8 8 8 4 GLUCOSE RANDOM mg/dL 143* 109 121 142* 205*   ALT U/L 7 6* 6*  --  5*   AST U/L 11* 11* 11*  --  9*   ALK PHOS U/L 117* 131* 139*  --  121*   ALBUMIN g/dL 2 8* 3 0* 3 3* 3 3* 3 3*   TOTAL BILIRUBIN mg/dL 0 21 0 26 0 30  --  0 35     Results from last 7 days   Lab Units 11/22/22  0405 11/21/22  0507 11/17/22  0530   MAGNESIUM mg/dL 1 7* 1 9 2 1   PHOSPHORUS mg/dL 3 4 3 8 3 5      Results from last 7 days   Lab Units 11/22/22  0405 11/21/22  0507 11/20/22  1432 11/17/22  0625 11/16/22  1029   INR  2 78* 2 93* 2 75* 1 93* 1 93*   PTT seconds  --   --  58*  --   --           Results from last 7 days   Lab Units 11/20/22  1432 11/16/22  0404   LACTIC ACID mmol/L 0 5 0 8     ABG:  Results from last 7 days   Lab Units 11/22/22  0404   PH ART  7 330*   PCO2 ART mm Hg 55 7*   PO2 ART mm Hg 142 7*   HCO3 ART mmol/L 28 7*   BASE EXC ART mmol/L 2 3   ABG SOURCE  Line, Arterial     VBG:  Results from last 7 days   Lab Units 11/22/22  0404 11/21/22  0905 11/21/22  0507   PH CISCO   --   --  7 147*   PCO2 CISCO mm Hg  --   --  103 0*   PO2 CISCO mm Hg  --   --  35 4   HCO3 CISCO mmol/L  --   --  34 8*   BASE EXC CISCO mmol/L  --   --  4 1   ABG SOURCE  Line, Arterial   < >  --     < > = values in this interval not displayed  Results from last 7 days   Lab Units 11/21/22  0507 11/20/22  1432 11/17/22  0530 11/16/22  0404   PROCALCITONIN ng/ml 0 16 0 18 0 13 0 11       Micro  Results from last 7 days   Lab Units 11/20/22  1528 11/20/22  1441 11/20/22  1432 11/16/22  1852 11/16/22  1029 11/16/22  0404   BLOOD CULTURE   --  No Growth at 24 hrs  No Growth at 24 hrs   --   --  No Growth After 5 Days  No Growth After 5 Days  URINE CULTURE  No Growth <1000 cfu/mL  --   --   --   --   --    LEGIONELLA URINARY ANTIGEN  Negative  --   --   --  Negative  --    STREP PNEUMONIAE ANTIGEN, URINE  Negative  --   --  Negative  --   --        EKG: Atrial Fibrillation 60-70s  Imaging: I have personally reviewed pertinent reports        Intake and Output  I/O       11/20 0701 11/21 0700 11/21 0701  11/22 0700 11/22 0701  11/23 0700    I V  (mL/kg)  104 6 (0 7)     IV Piggyback  50     Total Intake(mL/kg)  154 6 (1)     Urine (mL/kg/hr) 100 2017 (0 5)     Total Output 100 2017     Net -100 -6879 4                  Height and Weights   Height: 5' 4" (162 6 cm)     Body mass index is 57 75 kg/m²  Weight (last 2 days)     Date/Time Weight    11/22/22 0227 153 (336 42)    11/21/22 1305 153 (337 97)            Nutrition       Diet Orders   (From admission, onward)             Start     Ordered    11/21/22 1314  Diet NPO  Diet effective now        References:    Nutrtion Support Algorithm Enteral vs  Parenteral   Question Answer Comment   Diet Type NPO    RD to adjust diet per protocol?  Yes        11/21/22 1314                  Active Medications  Scheduled Meds:  Current Facility-Administered Medications   Medication Dose Route Frequency Provider Last Rate   • acetaminophen  650 mg Oral Q6H PRN TULIO Martin     • amLODIPine  5 mg Oral Daily TULIO Martin     • atorvastatin  10 mg Oral Daily TULIO Martin     • cefTRIAXone  2,000 mg Intravenous Q24H TULIO Martin 2,000 mg (11/21/22 1746)   • collagenase   Topical Daily TULIO Martin     • dexmedetomidine  0 1-1 2 mcg/kg/hr Intravenous Titrated Beau G Neto 1 2 mcg/kg/hr (11/22/22 1090)   • famotidine  10 mg Oral Daily TULIO Martin     • guaiFENesin  1,200 mg Oral BID TULIO Martin     • hydrOXYzine HCL  25 mg Oral Q6H PRN TULIO Martin     • ipratropium  0 5 mg Nebulization TID TULIO Martin     • levalbuterol  1 25 mg Nebulization TID TULIO Martin     • levothyroxine  100 mcg Oral Early Morning TULIO Martin     • magnesium sulfate  2 g Intravenous Once TULIO Martin     • methylPREDNISolone sodium succinate  40 mg Intravenous [facility] TULIO Trent     • metoprolol tartrate  50 mg Oral BID TULIO Arreola     • warfarin  3 mg Oral Daily TULIO Arreola       Continuous Infusions:  dexmedetomidine, 0 1-1 2 mcg/kg/hr, Last Rate: 1 2 mcg/kg/hr (11/22/22 9984)      PRN Meds:   acetaminophen, 650 mg, Q6H PRN  hydrOXYzine HCL, 25 mg, Q6H PRN        Invasive Devices Review  Invasive Devices     Peripheral Intravenous Line  Duration           Peripheral IV 11/20/22 Left Antecubital 1 day    Peripheral IV 11/21/22 Right;Upper;Ventral (anterior) Arm <1 day          Arterial Line  Duration           Arterial Line 11/21/22 Radial <1 day                Rationale for remaining devices:   [place] - multiple ABG required  ---------------------------------------------------------------------------------------  Advance Directive and Living Will:      Power of :    POLST:    ---------------------------------------------------------------------------------------  Care Time Delivered:   No Critical Care time spent       TULIO Arreola      Portions of the record may have been created with voice recognition software  Occasional wrong word or "sound a like" substitutions may have occurred due to the inherent limitations of voice recognition software    Read the chart carefully and recognize, using context, where substitutions have occurred

## 2022-11-22 NOTE — ASSESSMENT & PLAN NOTE
· Baseline oxygen 4L nasal cannula  · Non compliant with CPAP at home  · 11/20 ABG: pH 7 1 pCO2 129 6 pO2 83 1 HCO3 39 6  · Placed on bipap but not tolerating  · CPAP settings increased from 19/6 to 25/12  · 11/21 Repeat ABG: pH 7 25 pCO2 66 6 pO2 73 8 HCO3 28 9  · 11/22 ABG pH 7 33 pCO2 55 7 pO2 142 7 HCO3 28 7    Plan  · Trial off Bipap  · Respiratory protocol  · Atrovent/Xopenex TID  · Repeat ABG as needed

## 2022-11-22 NOTE — ASSESSMENT & PLAN NOTE
· Home regimen: albuterol inhaler and nebulizer  · Baseline: 4L oxygen nasal cannula  · No PFTs noted in chart  · 11/21 - 125mg solumedrol x1 and then 40mg solumedrol q8H    Plan  · Atroven/Xopenex TID  · Continue steroids

## 2022-11-22 NOTE — ASSESSMENT & PLAN NOTE
· 11/20 CTA C/A/P - small b/l pleural effusions  · 11/16 ECHO - Left ventricular cavity size is normal  Wall thickness is mildly increased  There is concentric remodeling  Wall motion and left ventricular ejection fraction cannot be accurately assessed  Left ventricular systolic function appears normal in limited views    · 11/21 - Received 40mg IV lasix x1 dose    Plan  · Diuresis as needed

## 2022-11-22 NOTE — CASE MANAGEMENT
Case Management Progress Note    Patient name Marquez Oconnell  Location ICU 06/ICU 06 MRN [id number]  : 1960 Date 2022       LOS (days): 1  Geometric Mean LOS (GMLOS) (days): 3 50  Days to GMLOS:2 4        OBJECTIVE:    Current admission status: Inpatient  Preferred Pharmacy:   [address] [place] [address] #[id number],[facility] Floor  [address] [place] [id number]  Phone: [phone] Fax: [phone]    Primary Care Provider: Alejandra Shafer MD    Primary Insurance: MEDICARE  Secondary Insurance: Danielle Brownlee    PROGRESS NOTE:    CM Left VM for pt emergency contact - Constanza to complete CM assessment

## 2022-11-22 NOTE — ASSESSMENT & PLAN NOTE
· Home regimen: metoprolol 50mg BID and Coumadin   · Current INR: 2 78    Plan:  · Check INR daily  · Resume home coumadin and metoprolol

## 2022-11-22 NOTE — RESPIRATORY THERAPY NOTE
Pt tried off bipap at this time and placed on [facility]    11/22/22 0725   Oxygen Therapy/Pulse Ox   O2 Device (S)  Nasal cannula   O2 Therapy Oxygen humidified   Nasal Cannula O2 Flow Rate (L/min) 3 L/min   Calculated FIO2 (%) - Nasal Cannula 32   FiO2 (%) 32   O2 Flow Rate (L/min) 3 L/min   SpO2 96 %   SpO2 Activity At Rest   $ Pulse Oximetry Spot Check Charge Completed Byron RODRIGUEZ

## 2022-11-22 NOTE — ASSESSMENT & PLAN NOTE
· Admit 11/16-11/17 for pneumonia and discharged on cefpodoxime 400mg BID for three days  · CTA C/A/P 11/20: Multifocal scattered patchy nodular opacities in right upper and possibly right lower lobes is degraded by motion artifact, suspicious for infection/inflammation or aspiration  Difficult to exclude underlying pulmonary nodules  Small bilateral pleural effusions with multifocal subsegmental atelectasis    · No leukocytosis  · Procal negative  · Lactic acid 0 5  · Afebrile  · Legionella/Stept - negative  · Blood cultures - negative x24H  · Ceftriaxone Day 3    Plan  · Repeat CBC in AM  · Considered discontinuing ceftriaxone   · Trend fever curve  · Follow up blood cultures

## 2022-11-22 NOTE — ASSESSMENT & PLAN NOTE
· Home regimen: 0 5mg ativan  · 11/22 - 5mg Haldol and Precedex gtt started 2/2 agitation, anxiety and patient pulling of Bipap    Plan  · Atarax 25mg q6H PRN  · Wean Precedex gtt as tolerated

## 2022-11-22 NOTE — PHYSICAL THERAPY NOTE
PHYSICAL THERAPY CANCELLATION NOTE    Patient Name: Jammie Aviles  GHTNC'T Date: 11/22/2022 11/22/22 1043   Note Type   Note type Cancelled Session   Cancel Reasons Medical status   Additional Comments PT orders received, chart review performed  Spoke to [employer], pt is not medically appropriate to participate in PT evaluation today, just taken off BiPap but still lethargic   Will f/u as appropriate to perform PT arnaldo Sheikh, PT, DPT

## 2022-11-22 NOTE — ASSESSMENT & PLAN NOTE
Lab Results   Component Value Date    HGBA1C 7 0 (H) 11/01/2022       Recent Labs     11/20/22  1408   POCGLU 126       Blood Sugar Average: Last 72 hrs:  · Patient refused insulin while inpatient on 11/16-11/17, stating she wants to confirm with PCP prior to starting diabetes treatment  · NPO while on BIPAP    Plan  · Accu checks q6hrs

## 2022-11-22 NOTE — OCCUPATIONAL THERAPY NOTE
Occupational Therapy Cancellation Note         Patient Name: Koby Guzmán  OLATQ'V Date: 11/22/2022 11/22/22 1044   Note Type   Note type Cancelled Session   Cancel Reasons Medical status   Additional Comments OT orders received, chart review completed  Per RN Anusha Arauz pt is recently taken off of BiPap but still lethargic at this time and not appropriate for OT intervention   Will hold OT eval and see as medically appropriate/able       Felix Hanks MS, OTR/L

## 2022-11-22 NOTE — ASSESSMENT & PLAN NOTE
Lab Results   Component Value Date    EGFR 31 11/22/2022    EGFR 33 11/21/2022    EGFR 31 11/20/2022    CREATININE 1 69 (H) 11/22/2022    CREATININE 1 63 (H) 11/21/2022    CREATININE 1 69 (H) 11/20/2022     · Baseline Cr: 1 8 - 2 4  · Admission Cr: 1 63  · Current Crt 1 69    Plan  · Avoid nephrotoxins  · Avoid hypotension  · Strict I&Os  · CMP in AM

## 2022-11-23 PROBLEM — J96.22 ACUTE ON CHRONIC RESPIRATORY FAILURE WITH HYPOXIA AND HYPERCAPNIA (HCC): Status: ACTIVE | Noted: 2022-11-21

## 2022-11-23 PROBLEM — J96.21 ACUTE ON CHRONIC RESPIRATORY FAILURE WITH HYPOXIA AND HYPERCAPNIA (HCC): Status: ACTIVE | Noted: 2022-11-21

## 2022-11-23 LAB
ALBUMIN SERPL BCP-MCNC: 3.1 G/DL (ref 3.5–5)
ALP SERPL-CCNC: 118 U/L (ref 34–104)
ALT SERPL W P-5'-P-CCNC: 9 U/L (ref 7–52)
ANION GAP SERPL CALCULATED.3IONS-SCNC: 5 MMOL/L (ref 4–13)
AST SERPL W P-5'-P-CCNC: 15 U/L (ref 13–39)
BASE EX.OXY STD BLDV CALC-SCNC: 89 % (ref 60–80)
BASE EXCESS BLDV CALC-SCNC: 13.6 MMOL/L
BASOPHILS # BLD AUTO: 0.04 THOUSANDS/ÂΜL (ref 0–0.1)
BASOPHILS NFR BLD AUTO: 1 % (ref 0–1)
BILIRUB SERPL-MCNC: 0.28 MG/DL (ref 0.2–1)
BUN SERPL-MCNC: 38 MG/DL (ref 5–25)
CALCIUM ALBUM COR SERPL-MCNC: 9.9 MG/DL (ref 8.3–10.1)
CALCIUM SERPL-MCNC: 9.2 MG/DL (ref 8.4–10.2)
CHLORIDE SERPL-SCNC: 97 MMOL/L (ref 96–108)
CO2 SERPL-SCNC: 37 MMOL/L (ref 21–32)
CREAT SERPL-MCNC: 1.69 MG/DL (ref 0.6–1.3)
EOSINOPHIL # BLD AUTO: 0.02 THOUSAND/ÂΜL (ref 0–0.61)
EOSINOPHIL NFR BLD AUTO: 0 % (ref 0–6)
ERYTHROCYTE [DISTWIDTH] IN BLOOD BY AUTOMATED COUNT: 16.6 % (ref 11.6–15.1)
GFR SERPL CREATININE-BSD FRML MDRD: 31 ML/MIN/1.73SQ M
GLUCOSE SERPL-MCNC: 152 MG/DL (ref 65–140)
GLUCOSE SERPL-MCNC: 161 MG/DL (ref 65–140)
GLUCOSE SERPL-MCNC: 163 MG/DL (ref 65–140)
GLUCOSE SERPL-MCNC: 164 MG/DL (ref 65–140)
GLUCOSE SERPL-MCNC: 202 MG/DL (ref 65–140)
GLUCOSE SERPL-MCNC: 219 MG/DL (ref 65–140)
HCO3 BLDV-SCNC: 41.9 MMOL/L (ref 24–30)
HCT VFR BLD AUTO: 30.8 % (ref 34.8–46.1)
HGB BLD-MCNC: 8.6 G/DL (ref 11.5–15.4)
IMM GRANULOCYTES # BLD AUTO: 0.16 THOUSAND/UL (ref 0–0.2)
IMM GRANULOCYTES NFR BLD AUTO: 2 % (ref 0–2)
INR PPP: 2.88 (ref 0.84–1.19)
LYMPHOCYTES # BLD AUTO: 0.71 THOUSANDS/ÂΜL (ref 0.6–4.47)
LYMPHOCYTES NFR BLD AUTO: 9 % (ref 14–44)
MAGNESIUM SERPL-MCNC: 2 MG/DL (ref 1.9–2.7)
MCH RBC QN AUTO: 29.6 PG (ref 26.8–34.3)
MCHC RBC AUTO-ENTMCNC: 27.9 G/DL (ref 31.4–37.4)
MCV RBC AUTO: 106 FL (ref 82–98)
MONOCYTES # BLD AUTO: 0.69 THOUSAND/ÂΜL (ref 0.17–1.22)
MONOCYTES NFR BLD AUTO: 8 % (ref 4–12)
NASAL CANNULA: 4
NEUTROPHILS # BLD AUTO: 6.61 THOUSANDS/ÂΜL (ref 1.85–7.62)
NEUTS SEG NFR BLD AUTO: 80 % (ref 43–75)
NRBC BLD AUTO-RTO: 1 /100 WBCS
O2 CT BLDV-SCNC: 12.1 ML/DL
PCO2 BLDV: 80.1 MM HG (ref 42–50)
PH BLDV: 7.34 [PH] (ref 7.3–7.4)
PHOSPHATE SERPL-MCNC: 2.7 MG/DL (ref 2.3–4.1)
PLATELET # BLD AUTO: 236 THOUSANDS/UL (ref 149–390)
PMV BLD AUTO: 10.2 FL (ref 8.9–12.7)
PO2 BLDV: 68.3 MM HG (ref 35–45)
POTASSIUM SERPL-SCNC: 4.6 MMOL/L (ref 3.5–5.3)
PROT SERPL-MCNC: 7 G/DL (ref 6.4–8.4)
PROTHROMBIN TIME: 30.5 SECONDS (ref 11.6–14.5)
RBC # BLD AUTO: 2.91 MILLION/UL (ref 3.81–5.12)
SODIUM SERPL-SCNC: 139 MMOL/L (ref 135–147)
WBC # BLD AUTO: 8.23 THOUSAND/UL (ref 4.31–10.16)

## 2022-11-23 RX ORDER — INSULIN LISPRO 100 [IU]/ML
2-12 INJECTION, SOLUTION INTRAVENOUS; SUBCUTANEOUS
Status: DISCONTINUED | OUTPATIENT
Start: 2022-11-23 | End: 2022-12-03 | Stop reason: HOSPADM

## 2022-11-23 RX ORDER — POLYETHYLENE GLYCOL 3350 17 G/17G
17 POWDER, FOR SOLUTION ORAL DAILY
Status: DISCONTINUED | OUTPATIENT
Start: 2022-11-23 | End: 2022-12-03 | Stop reason: HOSPADM

## 2022-11-23 RX ORDER — METHYLPREDNISOLONE SODIUM SUCCINATE 40 MG/ML
40 INJECTION, POWDER, LYOPHILIZED, FOR SOLUTION INTRAMUSCULAR; INTRAVENOUS EVERY 12 HOURS SCHEDULED
Status: DISCONTINUED | OUTPATIENT
Start: 2022-11-23 | End: 2022-11-24

## 2022-11-23 RX ORDER — METOPROLOL TARTRATE 50 MG/1
50 TABLET, FILM COATED ORAL 2 TIMES DAILY
Status: DISCONTINUED | OUTPATIENT
Start: 2022-11-23 | End: 2022-12-03 | Stop reason: HOSPADM

## 2022-11-23 RX ORDER — HYDROXYZINE HYDROCHLORIDE 25 MG/1
25 TABLET, FILM COATED ORAL
Status: DISCONTINUED | OUTPATIENT
Start: 2022-11-23 | End: 2022-11-26

## 2022-11-23 RX ORDER — SENNOSIDES 8.6 MG
1 TABLET ORAL 2 TIMES DAILY
Status: DISCONTINUED | OUTPATIENT
Start: 2022-11-23 | End: 2022-12-03 | Stop reason: HOSPADM

## 2022-11-23 RX ORDER — FUROSEMIDE 10 MG/ML
40 INJECTION INTRAMUSCULAR; INTRAVENOUS ONCE
Status: COMPLETED | OUTPATIENT
Start: 2022-11-23 | End: 2022-11-23

## 2022-11-23 RX ADMIN — Medication 6 MG: at 22:19

## 2022-11-23 RX ADMIN — DEXMEDETOMIDINE HYDROCHLORIDE 0.8 MCG/KG/HR: 4 INJECTION, SOLUTION INTRAVENOUS at 00:26

## 2022-11-23 RX ADMIN — WARFARIN SODIUM 3 MG: 3 TABLET ORAL at 09:25

## 2022-11-23 RX ADMIN — LEVOTHYROXINE SODIUM 100 MCG: 100 TABLET ORAL at 05:29

## 2022-11-23 RX ADMIN — LEVALBUTEROL HYDROCHLORIDE 1.25 MG: 1.25 SOLUTION, CONCENTRATE RESPIRATORY (INHALATION) at 07:17

## 2022-11-23 RX ADMIN — BUDESONIDE 0.5 MG: 0.5 INHALANT ORAL at 19:38

## 2022-11-23 RX ADMIN — IPRATROPIUM BROMIDE 0.5 MG: 0.5 SOLUTION RESPIRATORY (INHALATION) at 13:03

## 2022-11-23 RX ADMIN — COLLAGENASE SANTYL: 250 OINTMENT TOPICAL at 12:40

## 2022-11-23 RX ADMIN — ATORVASTATIN CALCIUM 10 MG: 10 TABLET, FILM COATED ORAL at 09:25

## 2022-11-23 RX ADMIN — LEVALBUTEROL HYDROCHLORIDE 1.25 MG: 1.25 SOLUTION, CONCENTRATE RESPIRATORY (INHALATION) at 19:38

## 2022-11-23 RX ADMIN — FORMOTEROL FUMARATE DIHYDRATE 20 MCG: 20 SOLUTION RESPIRATORY (INHALATION) at 19:38

## 2022-11-23 RX ADMIN — FORMOTEROL FUMARATE DIHYDRATE 20 MCG: 20 SOLUTION RESPIRATORY (INHALATION) at 07:17

## 2022-11-23 RX ADMIN — DEXMEDETOMIDINE HYDROCHLORIDE 0.6 MCG/KG/HR: 4 INJECTION, SOLUTION INTRAVENOUS at 07:22

## 2022-11-23 RX ADMIN — METHYLPREDNISOLONE SODIUM SUCCINATE 40 MG: 40 INJECTION, POWDER, FOR SOLUTION INTRAMUSCULAR; INTRAVENOUS at 22:22

## 2022-11-23 RX ADMIN — IPRATROPIUM BROMIDE 0.5 MG: 0.5 SOLUTION RESPIRATORY (INHALATION) at 07:17

## 2022-11-23 RX ADMIN — FAMOTIDINE 10 MG: 20 TABLET, FILM COATED ORAL at 09:25

## 2022-11-23 RX ADMIN — IPRATROPIUM BROMIDE 0.5 MG: 0.5 SOLUTION RESPIRATORY (INHALATION) at 19:38

## 2022-11-23 RX ADMIN — GUAIFENESIN 1200 MG: 600 TABLET, EXTENDED RELEASE ORAL at 17:51

## 2022-11-23 RX ADMIN — DEXMEDETOMIDINE HYDROCHLORIDE 0.8 MCG/KG/HR: 4 INJECTION, SOLUTION INTRAVENOUS at 03:49

## 2022-11-23 RX ADMIN — METHYLPREDNISOLONE SODIUM SUCCINATE 40 MG: 40 INJECTION, POWDER, FOR SOLUTION INTRAMUSCULAR; INTRAVENOUS at 09:53

## 2022-11-23 RX ADMIN — METOPROLOL TARTRATE 50 MG: 50 TABLET, FILM COATED ORAL at 22:19

## 2022-11-23 RX ADMIN — SENNOSIDES 8.6 MG: 8.6 TABLET, FILM COATED ORAL at 09:25

## 2022-11-23 RX ADMIN — INSULIN LISPRO 2 UNITS: 100 INJECTION, SOLUTION INTRAVENOUS; SUBCUTANEOUS at 22:31

## 2022-11-23 RX ADMIN — BUDESONIDE 0.5 MG: 0.5 INHALANT ORAL at 07:17

## 2022-11-23 RX ADMIN — HYDROXYZINE HYDROCHLORIDE 25 MG: 25 TABLET, FILM COATED ORAL at 22:19

## 2022-11-23 RX ADMIN — FUROSEMIDE 40 MG: 10 INJECTION, SOLUTION INTRAMUSCULAR; INTRAVENOUS at 09:53

## 2022-11-23 RX ADMIN — LEVALBUTEROL HYDROCHLORIDE 1.25 MG: 1.25 SOLUTION, CONCENTRATE RESPIRATORY (INHALATION) at 13:04

## 2022-11-23 RX ADMIN — GUAIFENESIN 1200 MG: 600 TABLET, EXTENDED RELEASE ORAL at 09:25

## 2022-11-23 NOTE — OCCUPATIONAL THERAPY NOTE
Occupational Therapy Evaluation     Patient Name: Adelina Islas  HPANK'M Date: 11/23/2022  Problem List  Principal Problem:    Acute on chronic respiratory failure with hypoxia and hypercapnia (HCC)  Active Problems:    Chronic kidney disease, stage III (moderate) (HCC)    Dyslipidemia    Paroxysmal atrial fibrillation (HCC)    COPD with asthma (HCC)    Hypothyroid    GERD (gastroesophageal reflux disease)    Anxiety    Morbid (severe) obesity due to excess calories ([facility] [address] )    Diabetes mellitus type 2 in obese ([facility] [address] )    Leg wound, left, initial encounter    Pneumonia due to infectious organism    Bilateral pleural effusion    Acute metabolic encephalopathy    Panniculitis    Past Medical History  Past Medical History:   Diagnosis Date    Anemia     Arthritis     Cancer of kidney ([facility] [address] )     Chronic kidney disease     Diabetes mellitus ([facility] [address] )     Disease of thyroid gland     HLD (hyperlipidemia)     Hypertension     Ovarian cancer ([facility] [address] )     Pneumonia      Past Surgical History  Past Surgical History:   Procedure Laterality Date    CHOLECYSTECTOMY      CT GUIDED PERC DRAINAGE CATHETER PLACEMENT  5/16/2016    GALLBLADDER SURGERY  05/01/2004    HYSTERECTOMY  06/01/2018    NEPHRECTOMY Right 08/02/2018 11/23/22 0823   OT Last Visit   OT Visit Date 11/23/22   Note Type   Note type Evaluation   Pain Assessment   Pain Assessment Tool 0-10   Pain Score No Pain   Restrictions/Precautions   Weight Bearing Precautions Per Order No   Other Precautions Cognitive; Chair Alarm; Bed Alarm;Multiple lines;Telemetry;O2;Fall Risk  (4L NC O2)   Home Living   Type of Home Apartment   Home Layout One level;Ramped entrance   Bathroom Shower/Tub Walk-in shower   Bathroom Toilet Standard   Bathroom Equipment Grab bars in shower; Shower chair;Commode   Bathroom Accessibility Accessible via wheelchair   [address] [place] [address],[address]; Wheelchair-manual;Hospital bed; Wheelchair-electric   Additional Comments use of RW for short distances, pt reports that more recently she is using w/c and able to self propel w/c   Prior Function   Level of [place] Needs assistance with ADLs  ((A) with LB dressing and bathing, pt reports (I) with UB dressing and toileting)   Lives With Significant other  Jeri Himanshu)   Receives Help From Family   IADLs Family/Friend/Other provides transportation; Family/Friend/Other provides meals  (Constanza A with cooking, cleaning, laundry)   Falls in the last 6 months 1 to 4  (at least 2 episodes of sliding from chair onto ground)   Vocational Retired   Comments Pt is a questionable historian at times, providing conflicting information   Lifestyle   Autonomy PTA pt living with significant other/caretaker Constanza in apartment, pt requires (A) with ADLs and IADLs, (+)falls, (-)drives, use of w/c vs RW at baseline   Reciprocal Relationships supportive cargiver [employer]   Service to Others retired   [address] enjoys "watching tv"   General   Family/Caregiver Present No   Subjective   Subjective "Well just roll me over to the couch and I'll push myself back in this chair"   ADL   Eating Assistance 5  Supervision/Setup   Eating Deficit Setup   Grooming Assistance 5  Supervision/Setup   UB Bathing Assistance 4  Minimal Assistance   LB [address] 2  Maximal [address] 1  Total Assistance   LB Dressing Deficit Don/doff R sock; Don/doff L sock  (Pt states "you have to do it")   Toileting Assistance  2  Maximal Assistance   Bed Mobility   Supine to Sit 2  Maximal assistance   Additional items Assist x 1; Increased time required;Verbal cues;LE management   Transfers   Sit to Stand 4  Minimal assistance   Additional items Assist x 1; Increased time required;Verbal cues; Impulsive  (wth SBA of 2nd due to IMPULSIVITY)   Stand to Sit 4  Minimal assistance   Additional items Assist x 1; Increased time required; Impulsive;Verbal cues  (wth SBA of 2nd due to IMPULSIVITY) Additional Comments Pt highly impulsive with transfers, requiring max VC throughout and SBA of 2nd for safety   Functional Mobility   Functional Mobility 4  Minimal assistance   Additional Comments Ax1, limited side stepping to recliner chair, unsafe with RW, requiring A with management of RW   Additional items Rolling walker   Balance   Static Sitting Fair -   Dynamic Sitting Fair -   Static Standing Poor +   Dynamic Standing Poor +   Ambulatory Poor +   Activity Tolerance   Activity Tolerance Patient limited by fatigue   Medical Staff Made Aware PT LEONIE Ceballos Neeraj   RUE Assessment   RUE Assessment WFL   LUE Assessment   LUE Assessment WFL   Hand Function   Gross Motor Coordination Functional   Fine Motor Coordination Functional   Cognition   Overall Cognitive Status Impaired   Arousal/Participation Alert; Cooperative   Attention Attends with cues to redirect   Orientation Level Oriented to person;Oriented to place; Disoriented to time;Disoriented to situation   Memory Decreased short term memory;Decreased recall of recent events;Decreased recall of precautions   Following Commands Follows one step commands with increased time or repetition   Comments Pt highly IMPULSIVE and requires max VC for safety and attention to task  Pt with limited insight into deficits and problem solving   Assessment   Limitation Decreased ADL status; Decreased UE strength;Decreased Safe judgement during ADL;Decreased endurance;Decreased self-care trans;Decreased high-level ADLs; Decreased cognition   Prognosis Fair   Assessment Pt is a 60 y o  female seen for OT evaluation s/p admission to [address] [place] [address] on 11/21/2022 due to weakness  Pt diagnosed with Acute on chronic respiratory failure with hypoxia and hypercapnia ([facility] [address] )  Pt has a significant PMH impacting occupational performance including: a fib, COPD, anxiety, anxiety, CKD, obesity, DM II, leg wound  Pt with 2 recent admissions in the last 2 months   Pt with active OT evaluation and treatment orders and activity orders for Up and OOB as tolerated   PTA pt living with significant other/caretaker Constanza in apartment, pt requires (A) with ADLs and IADLs, (+)falls, (-)drives, use of w/c vs RW at baseline  Pt is motivated to return to home  Personal and environmental factors supporting pt at time of IE include social support, attitude towards recovery and accessible home environment  Personal and environmental factors inhibiting engagement in occupations include current habits and behavioral patterns, lifestyle patterns, difficulty completing ADLs and difficulty completing IADLs  During evaluation pt performed as is outlined above in flowsheet  Pt required VC for safety and VC for attention to task  Standardized assessments used to assist in identifying performance deficits include AMPAC 6-Clicks and Barthel ADL Index  Performance deficits that affect the pt’s occupational performance during the initial evaluation include impaired balance, functional mobility, endurance, activity tolerance, functional standing tolerance and overall strength, communication and social skills, safety awareness, insight into deficits and problem solving, interpersonal skills  Based on pt’s functional performance and deficits the following occupations will be addressed in OT treatments in order to maximize pt’s independence and overall occupational performance: grooming, bathing/showering, toileting and toilet hygiene, dressing and functional mobility  Goals are listed below  Upon discharge from acute care setting recommend d/c to PAR vs home with [facility] [address] pending level of support Kenrick Chambers able to provide and progress with therapy, more likely PAR at this time  This evaluation required an extensive review of medical and/or therapy records and additional review of physical, cognitive and psychosocial history related to functional performance   Based upon functional performance deficits and assessments, this evaluation has been identified as a high complexity evaluation  Goals   Patient Goals to go home   LTG Time Frame 10-14   Long Term Goal see goals listed below   Plan   Treatment Interventions ADL retraining;Functional transfer training; Endurance training;Cognitive reorientation;Patient/family training;Equipment evaluation/education; Compensatory technique education; Energy conservation; Activityengagement   Goal Expiration Date 12/07/22   OT Treatment Day 0   OT Frequency 2-3x/wk   Recommendation   OT Discharge Recommendation (S)  Post acute rehabilitation services  (vs home with HHOT pending level of assist Vivienne Hess is able to provide and progress with therapy)   AM-PAC Daily Activity Inpatient   Lower Body Dressing 1   Bathing 2   Toileting 2   Upper Body Dressing 2   Grooming 3   Eating 3   Daily Activity Raw Score 13   Daily Activity Standardized Score (Calc for Raw Score >=11) 32 03   AM-PAC Applied Cognition Inpatient   Following a Speech/Presentation 3   Understanding Ordinary Conversation 3   Taking Medications 2   Remembering Where Things Are Placed or Put Away 1   Remembering List of 4-5 Errands 1   Taking Care of Complicated Tasks 1   Applied Cognition Raw Score 11   Applied Cognition Standardized Score 27 03   Barthel Index   Feeding 5   Bathing 0   Grooming Score 0   Dressing Score 5   Bladder Score 5   Bowels Score 10   Toilet Use Score 5   Transfers (Bed/Chair) Score 10   Mobility (Level Surface) Score 0   Stairs Score 0   Barthel Index Score 40   End of Consult   Patient Position at End of Consult Bedside chair;Bed/Chair alarm activated; All needs within reach     GOALS:      -Patient will perform grooming tasks sitting at sink with overall Mod I in order to increase overall independence    -Patient will be Mod I with UB ADLs using AE and AD as needed in order to increase (I) with ADLs    -Patient will be Mod A  with LB ADLs with use of AE and AD as needed in order to increase (I) with ADLs    -Patient will complete toileting w/ Mod A  w/ G hygiene/thoroughness in order to reduce caregiver burden    -Patient will demonstrate Supervision with bed mobility for ability to manage own comfort and initiate OOB tasks      -Patient will perform functional transfers with Supervision to/from all surfaces using DME as needed in order to increase (I) with functional tasks    -Patient will be Supervision with functional mobility to/from bathroom for increased independence with toileting tasks    -Patient will tolerate therapeutic activities for greater than 30 min, in order to increase tolerance for functional activities      -Patient will increase OOB/sitting tolerance to 2-4 hours per day to increase participation in self-care and leisure tasks with no s/s of exertion      -Patient will engage in ongoing cognitive assessment in order to assist with safe discharge planning/recommendations     -Patient will follow multi-step instructions with no VC in order to safely complete functional tasks       The patient's raw score on the -PAC Daily Activity inpatient short form is 13, standardized score is 32 03, less than 39 4  Patients at this level are likely to benefit from discharge to post-acute rehabilitation services  Please refer to the recommendation of the Occupational Therapist for safe discharge planning  This session, pt required and most appropriately benefited from skilled OT/PT co-eval due to cognitive-communication impairments, significant regression from functional baseline, decreased activity tolerance, and unpredictable medical and/or functional status  OT and PT goals were addressed separately as seen in documentation       Ever Vazquez MS, OTR/L

## 2022-11-23 NOTE — ASSESSMENT & PLAN NOTE
· S/p diuresis with 40mg IV lasix daily  · Would continue PRN diuresis for goal -500ml over 24 hours

## 2022-11-23 NOTE — ASSESSMENT & PLAN NOTE
Lab Results   Component Value Date    EGFR 31 11/23/2022    EGFR 33 11/22/2022    EGFR 31 11/22/2022    CREATININE 1 69 (H) 11/23/2022    CREATININE 1 62 (H) 11/22/2022    CREATININE 1 69 (H) 11/22/2022     · Baseline Cr: 1 8 - 2 4  · Currently at baseline  · Continue to monitor I/O and renal indices  · Continue PRN diuresis for goal negative 500ml/24 hours

## 2022-11-23 NOTE — ASSESSMENT & PLAN NOTE
· Diagnosed as OP  · S/p 5 days abx as OP and continued on admssion  · Patient remains afebrile, without leukocytosis, procal negative   · Monitor off abx

## 2022-11-23 NOTE — ASSESSMENT & PLAN NOTE
Lab Results   Component Value Date    HGBA1C 7 0 (H) 11/01/2022       Recent Labs     11/20/22  1408 11/22/22  1732 11/23/22  0028 11/23/22  0532   POCGLU 126 151* 152* 164*       Blood Sugar Average: Last 72 hrs:    · ACHS glucose checks and SSI for goal glucose 140-180

## 2022-11-23 NOTE — ASSESSMENT & PLAN NOTE
· In the setting of COPD/asthma with acute exacerbation and recent PNA   · At baseline, requires 4L NC, O2 requirement now at baseline  · Continue BIPAP HS and PRN for SASHA/OHS, encourage compliance  · Continue scheduled nebs and steroids for COPD exacerbation  · S/p 5 day course abx for pneumonia  · Encourage cough, deep breathing, IS, ambulation with assistance

## 2022-11-23 NOTE — PROGRESS NOTES
[address] [facility]  Progress Note - [place] 1960, 60 y o  female MRN: [id number]  Unit/Bed#: ICU 06 Encounter: [id number]  Primary Care Provider: Maria Guadalupe Calles MD   Date and time admitted to hospital: 11/21/2022 12:58 PM    * Acute on chronic respiratory failure with hypoxia and hypercapnia ([facility] )  Assessment & Plan  · In the setting of COPD/asthma with acute exacerbation and recent PNA   · At baseline, requires 4L NC, O2 requirement now at baseline  · Continue BIPAP HS and PRN for SASHA/OHS, encourage compliance  · Continue scheduled nebs and steroids for COPD exacerbation  · S/p 5 day course abx for pneumonia  · Encourage cough, deep breathing, IS, ambulation with assistance     Acute metabolic encephalopathy  Assessment & Plan  · Likely in the setting of acute on chronic hypoxic and hypercapnic respiratory failure, now improving  · Continue BIPAP PRN and HS  · Regulate sleep/wake  · Delirium precautions  · Frequent neuro checks and reorientation     COPD with asthma ([facility] )  Assessment & Plan  · With acute exacerbation  · Continue scheduled xopenex, atrovent, pulmicort, perforomist  · Continue prednisone taper     Pneumonia due to infectious organism  Assessment & Plan  · Diagnosed as OP  · S/p 5 days abx as OP and continued on admssion  · Patient remains afebrile, without leukocytosis, procal negative   · Monitor off abx     Panniculitis  Assessment & Plan  · 11/20 CT C/A/P: acute panniculitis, not appreciated on physical exam  · Monitor off abx      Bilateral pleural effusion  Assessment & Plan  · S/p diuresis with 40mg IV lasix daily  · Would continue PRN diuresis for goal -500ml over 24 hours     Leg wound, left, initial encounter  Assessment & Plan  · Continue local wound care  · Wound nurse following     Diabetes mellitus type 2 in obese [facility])  Assessment & Plan  Lab Results   Component Value Date    HGBA1C 7 0 (H) 11/01/2022       Recent Labs     11/20/22  1408 11/22/22  7532 11/23/22  0028 11/23/22  0532   POCGLU 126 151* 152* 164*       Blood Sugar Average: Last 72 hrs:    · ACHS glucose checks and SSI for goal glucose 140-180    Morbid (severe) obesity due to excess calories ([facility])  Assessment & Plan  · Lifestyle modifications  · Nutrition consulte    Anxiety  Assessment & Plan  · PRN attarax HS in an attempt to wean off precedex drip    GERD (gastroesophageal reflux disease)  Assessment & Plan  · Continue home pepcid    Hypothyroid  Assessment & Plan  · Continue home levothyroxine     Paroxysmal atrial fibrillation ([facility])  Assessment & Plan  · Currently rate controlled  · Continue home metoprolol  · Continue coumadin 3mg daily, monitor daily INR currently at goal      Dyslipidemia  Assessment & Plan  · Continue home statin     Chronic kidney disease, stage III (moderate) ([facility])  Assessment & Plan  Lab Results   Component Value Date    EGFR 31 11/23/2022    EGFR 33 11/22/2022    EGFR 31 11/22/2022    CREATININE 1 69 (H) 11/23/2022    CREATININE 1 62 (H) 11/22/2022    CREATININE 1 69 (H) 11/22/2022     · Baseline Cr: 1 8 - 2 4  · Currently at baseline  · Continue to monitor I/O and renal indices  · Continue PRN diuresis for goal negative 500ml/24 hours       ----------------------------------------------------------------------------------------  HPI/24hr events: Tolerated nasal cannula throughout the day yesterday  Given 40mg IV lasix with good diuresis  Abx stopped  Refused BIPAP overnight despite addition of precedex to help with tolerance  Patient appropriate for transfer out of the ICU today?: No  Disposition: Continue Stepdown Level 1 level of care   Code Status: Level 1 - Full Code  ---------------------------------------------------------------------------------------  SUBJECTIVE  "My breathing feels better"    Review of Systems   Constitutional: Negative  HENT: Negative  Eyes: Negative  Respiratory: Positive for shortness of breath  Cardiovascular: Negative  Gastrointestinal: Negative  Endocrine: Negative  Genitourinary: Negative  Musculoskeletal: Negative  Skin: Negative  Allergic/Immunologic: Negative  Neurological: Negative  Hematological: Negative  Psychiatric/Behavioral: Negative  Review of systems was reviewed and negative unless stated above in HPI/24-hour events   ---------------------------------------------------------------------------------------  OBJECTIVE    Vitals   Vitals:    22 0333 22 0600 22 0700 22 0717   BP:  108/57 100/55 100/55   BP Location:    Left arm   Pulse:  73 71 83   Resp:  19 (!) 27 17   Temp:    97 7 °F (36 5 °C)   TempSrc:    Axillary   SpO2:  98% 99% 99%   Weight: (!) 161 kg (354 lb 11 5 oz)      Height:         Temp (24hrs), Av 6 °F (36 4 °C), Min:97 °F (36 1 °C), Max:98 3 °F (36 8 °C)  Current: Temperature: 97 7 °F (36 5 °C)  Arterial Line BP: 130/73  Arterial Line MAP (mmHg): 92 mmHg    Respiratory:  SpO2: SpO2: 99 %, SpO2 Device: O2 Device: Nasal cannula  Nasal Cannula O2 Flow Rate (L/min): 4 L/min    Invasive/non-invasive ventilation settings   Respiratory    Lab Data (Last 4 hours)    None         O2/Vent Data (Last 4 hours)    None                Physical Exam  Constitutional:       General: She is not in acute distress  Appearance: She is ill-appearing  HENT:      Head: Normocephalic and atraumatic  Mouth/Throat:      Mouth: Mucous membranes are moist    Eyes:      Pupils: Pupils are equal, round, and reactive to light  Cardiovascular:      Rate and Rhythm: Normal rate  Rhythm irregular  Pulses: Normal pulses  Heart sounds: Normal heart sounds  No murmur heard  No friction rub  No gallop  Pulmonary:      Effort: Pulmonary effort is normal  No respiratory distress  Breath sounds: Wheezing present  No rhonchi or rales  Abdominal:      General: Bowel sounds are normal  There is no distension  Palpations: Abdomen is soft  Musculoskeletal:         General: Swelling present  Normal range of motion  Cervical back: Neck supple  Right lower leg: Edema present  Left lower leg: Edema present  Skin:     General: Skin is warm and dry  Capillary Refill: Capillary refill takes less than 2 seconds  Neurological:      General: No focal deficit present  Mental Status: She is alert and oriented to person, place, and time  Cranial Nerves: No cranial nerve deficit  Sensory: No sensory deficit  Motor: No weakness               Laboratory and Diagnostics:  Results from last 7 days   Lab Units 11/23/22  0356 11/22/22  0405 11/21/22  0507 11/20/22  1432 11/17/22  0530   WBC Thousand/uL 8 23 4 95 7 37 7 40 7 02   HEMOGLOBIN g/dL 8 6* 8 0* 8 2* 9 0* 9 2*   HEMATOCRIT % 30 8* 28 6* 30 7* 32 7* 34 3*   PLATELETS Thousands/uL 236 239 242 269 284   NEUTROS PCT % 80*  --   --   --   --    BANDS PCT %  --   --   --  4  --    MONOS PCT % 8  --   --   --   --    MONO PCT %  --   --   --  8  --      Results from last 7 days   Lab Units 11/23/22  0356 11/22/22  1147 11/22/22  0405 11/21/22  0507 11/20/22  1432 11/17/22  0530   SODIUM mmol/L 139 140 140 140 139 137   POTASSIUM mmol/L 4 6 5 1 5 0 4 7 5 1 5 1   CHLORIDE mmol/L 97 99 101 101 101 100   CO2 mmol/L 37* 35* 33* 35* 37* 32   ANION GAP mmol/L 5 6 6 4 1* 5   BUN mg/dL 38* 31* 28* 21 20 25   CREATININE mg/dL 1 69* 1 62* 1 69* 1 63* 1 69* 1 87*   CALCIUM mg/dL 9 2 9 0 8 6 8 9 9 1 8 8   GLUCOSE RANDOM mg/dL 161* 173* 143* 109 121 142*   ALT U/L 9  --  7 6* 6*  --    AST U/L 15  --  11* 11* 11*  --    ALK PHOS U/L 118*  --  117* 131* 139*  --    ALBUMIN g/dL 3 1*  --  2 8* 3 0* 3 3* 3 3*   TOTAL BILIRUBIN mg/dL 0 28  --  0 21 0 26 0 30  --      Results from last 7 days   Lab Units 11/23/22  0356 11/22/22  0405 11/21/22  0507 11/17/22  0530   MAGNESIUM mg/dL 2 0 1 7* 1 9 2 1   PHOSPHORUS mg/dL 2 7 3 4 3 8 3 5      Results from last 7 days   Lab Units 11/23/22  0356 11/22/22  0405 11/21/22  0507 11/20/22  1432 11/17/22  0625 11/16/22  1029   INR  2 88* 2 78* 2 93* 2 75* 1 93* 1 93*   PTT seconds  --   --   --  58*  --   --           Results from last 7 days   Lab Units 11/22/22  1147 11/20/22  1432   LACTIC ACID mmol/L 1 9 0 5     ABG:  Results from last 7 days   Lab Units 11/22/22  1001   PH ART  7 341*   PCO2 ART mm Hg 37 2   PO2 ART mm Hg 95 4   HCO3 ART mmol/L 19 7*   BASE EXC ART mmol/L -5 6   ABG SOURCE  Line, Arterial     VBG:  Results from last 7 days   Lab Units 11/22/22  1148 11/22/22  1001   PH CISCO  7 275*  --    PCO2 CISCO mm Hg 78 0*  --    PO2 CISCO mm Hg 28 2*  --    HCO3 CISCO mmol/L 35 4*  --    BASE EXC CISCO mmol/L 6 9  --    ABG SOURCE   --  Line, Arterial     Results from last 7 days   Lab Units 11/21/22  0507 11/20/22  1432 11/17/22  0530   PROCALCITONIN ng/ml 0 16 0 18 0 13       Micro  Results from last 7 days   Lab Units 11/20/22  1528 11/20/22  1441 11/20/22  1432 11/16/22  1852 11/16/22  1029   BLOOD CULTURE   --  No Growth at 48 hrs  No Growth at 48 hrs   --   --    URINE CULTURE  No Growth <1000 cfu/mL  --   --   --   --    LEGIONELLA URINARY ANTIGEN  Negative  --   --   --  Negative   STREP PNEUMONIAE ANTIGEN, URINE  Negative  --   --  Negative  --        EKG: Afib rate 77  Imaging: I have personally reviewed pertinent reports  and I have personally reviewed pertinent films in PACS    Intake and Output  I/O       11/21 0701 11/22 0700 11/22 0701 11/23 0700 11/23 0701 11/24 0700    I V  (mL/kg) 104 6 (0 7) 322 2 (2)     IV Piggyback 50 50     Total Intake(mL/kg) 154 6 (1) 372 2 (2 3)     Urine (mL/kg/hr) 2017 (0 5) 3450 (0 9)     Total Output 2017 3450     Net -1862 4 -3077 8                  Height and Weights   Height: 5' 4" (162 6 cm)     Body mass index is 60 89 kg/m²    Weight (last 2 days)     Date/Time Weight    11/23/22 0333 161 (354 72)    11/22/22 0227 153 (336 42)    11/21/22 1305 153 (337 97)            Nutrition       Diet Orders   (From admission, onward)             Start     Ordered    11/22/22 1043  Diet Enrrique/CHO Controlled; Consistent Carbohydrate Diet Level 2 (5 carb servings/75 grams CHO/meal)  Diet effective now        References:    Nutrtion Support Algorithm Enteral vs  Parenteral   Question Answer Comment   Diet Type Enrrique/CHO Controlled    Enrrique/CHO Controlled Consistent Carbohydrate Diet Level 2 (5 carb servings/75 grams CHO/meal)    RD to adjust diet per protocol?  Yes        11/22/22 1043                  Active Medications  Scheduled Meds:  Current Facility-Administered Medications   Medication Dose Route Frequency Provider Last Rate   • acetaminophen  650 mg Oral Q6H PRN TULIO Mendoza     • amLODIPine  5 mg Oral Daily TULIO Mendoza     • atorvastatin  10 mg Oral Daily TULIO Mendoza     • budesonide  0 5 mg Nebulization Q12H Tri Shultz MD     • collagenase   Topical Daily TULIO Mendoza     • dexmedetomidine  0 1-1 2 mcg/kg/hr Intravenous Titrated Beau G Neto 0 6 mcg/kg/hr (11/23/22 8443)   • famotidine  10 mg Oral Daily TULIO Mendoza     • formoterol  20 mcg Nebulization Q12H Tri Shultz MD     • guaiFENesin  1,200 mg Oral BID TULIO Mendoza     • hydrOXYzine HCL  25 mg Oral Q6H PRN TULIO Mendoza     • insulin lispro  2-12 Units Subcutaneous TID AC TULIO Cordon     • insulin lispro  2-12 Units Subcutaneous HS TULIO Cordon     • ipratropium  0 5 mg Nebulization TID TULIO Mendoza     • levalbuterol  1 25 mg Nebulization TID TULIO Mendoza     • levothyroxine  100 mcg Oral Early Morning TULIO Mendoza     • melatonin  6 mg Oral HS Beau Corley     • metoprolol tartrate  50 mg Oral BID TULIO Mendoza     • predniSONE  40 mg Oral Daily TULIO Mendoza      Followed by   • [START ON 11/26/2022] predniSONE  30 mg Oral Daily TULIO Mendoza      Followed by   • [START ON 11/29/2022] predniSONE  20 mg Oral Daily Merline Notch, CRNP      Followed by   • [START ON 12/2/2022] predniSONE  10 mg Oral Daily Merline Notch, CRNP     • warfarin  3 mg Oral Daily Merline Notch, CRNP       Continuous Infusions:  dexmedetomidine, 0 1-1 2 mcg/kg/hr, Last Rate: 0 6 mcg/kg/hr (11/23/22 0722)      PRN Meds:   acetaminophen, 650 mg, Q6H PRN  hydrOXYzine HCL, 25 mg, Q6H PRN        Invasive Devices Review  Invasive Devices     Peripheral Intravenous Line  Duration           Peripheral IV 11/20/22 Left Antecubital 2 days    Peripheral IV 11/21/22 Right;Upper;Ventral (anterior) Arm 1 day          Drain  Duration           External Urinary Catheter 1 day                ---------------------------------------------------------------------------------------  Advance Directive and Living Will:      Power of :    POLST:    ---------------------------------------------------------------------------------------  Care Time Delivered:   Upon my evaluation, this patient had a high probability of imminent or life-threatening deterioration due to hypoxic resp failure, which required my direct attention, intervention, and personal management  I have personally provided 35 minutes (0730 to 0805) of critical care time, exclusive of procedures, teaching, family meetings, and any prior time recorded by providers other than myself  [employer], CRNP      Portions of the record may have been created with voice recognition software  Occasional wrong word or "sound a like" substitutions may have occurred due to the inherent limitations of voice recognition software    Read the chart carefully and recognize, using context, where substitutions have occurred

## 2022-11-23 NOTE — PLAN OF CARE
Problem: PHYSICAL THERAPY ADULT  Goal: Performs mobility at highest level of function for planned discharge setting  See evaluation for individualized goals  Description: Treatment/Interventions: Functional transfer training, LE strengthening/ROM, Therapeutic exercise, Endurance training, Cognitive reorientation, Patient/family training, Equipment eval/education, Bed mobility, Gait training  Equipment Recommended: Anay Mejia, Wheelchair       See flowsheet documentation for full assessment, interventions and recommendations  11/23/2022 1247 by Esthela Hagen, PT  Note:    Problem List: Decreased strength, Decreased endurance, Impaired balance, Decreased mobility, Decreased cognition, Impaired judgement, Decreased safety awareness, Obesity  Assessment: Anupama Perez is a 60 y o  Female who [facility] 11[facility]21[facility]2022 (transfer from [facility]) from home w/ c/o generalized weakness and lethargy and diagnosis of acute on chronic respiratory failure w/ hypxoai and hypercapnia  Orders for PT eval and treat received  Pt presents w/ comorbidities of COPD, Afib, CKD Stage 3, GERD, anxiety, DMII,   At baseline, pt mobilizes supervision w/ RW vs WC for short distances, and reports + falls in the last 6 months  Upon evaluation, pt presents w/ the following deficits: weakness, impaired balance, decreased endurance and impaired safety awareness and problem solving during functional mobility  Upon eval, pt requires max a x 1 for bed mobility, min A x 1 for transfers, and min A x 1 for gait  Pt's clinical presentation is unstable/unpredictable due to need for assist w/ all phases of mobility when usually mobilizing independently, need for supplemental oxygen in order to maintain oxygen saturation, need for input for mobility technique/safety, recent drastic decline in mobility compared to baseline, recent readmission (within 30 days) and recent history of falls   Patient is at an increased risk of falls due to physical and cognitive deficits  Given the above findings, discharge recommendation is for Post-acute inpatient rehabilitation vs home w/ HHPT and family support pending level of A pt's SO/caregiver can provide, coupled w/ continued mobility progress  During this admission, pt would benefit from continued skilled inpatient PT in the acute care setting in order to address the abovementioned deficits to maximize function and mobility before DC from acute care  PT Discharge Recommendation: Post acute rehabilitation services    See flowsheet documentation for full assessment

## 2022-11-23 NOTE — ASSESSMENT & PLAN NOTE
· Currently rate controlled  · Continue home metoprolol  · Continue coumadin 3mg daily, monitor daily INR currently at goal

## 2022-11-23 NOTE — CASE MANAGEMENT
Case Management Assessment & Discharge Planning Note    Patient name Erum Oakes ICU 06/ICU 06 MRN [id number]  : 1960 Date 2022       Current Admission Date: 2022  Current Admission Diagnosis:Acute on chronic respiratory failure with hypoxia and hypercapnia [facility])   Patient Active Problem List    Diagnosis Date Noted   • Acute metabolic encephalopathy    • Panniculitis 2022   • Acute on chronic respiratory failure with hypoxia and hypercapnia ([facility] ) 2022   • Abnormal CT scan 2022   • Panniculus 2022   • Bilateral pleural effusion 2022   • Pneumonia due to infectious organism 2022   • Cardiomegaly 2022   • Diabetic polyneuropathy associated with type 2 diabetes mellitus ([facility] ) 10/26/2022   • Leg wound, left, initial encounter 10/26/2022   • Diabetic neuropathy ([facility] ) 10/26/2022   • Tinea 2022   • Diabetic nephropathy associated with type 2 diabetes mellitus ([facility] ) 2022   • Depression, recurrent ([facility] ) 2022   • Diabetes mellitus type 2 in obese ([facility] ) 2022   • Pre-ulcerative corn or callous 2022   • Supratherapeutic INR 2021   • Morbid (severe) obesity due to excess calories ([facility] ) 2021   • Secondary hyperparathyroidism of renal origin ([facility] ) 2021   • GERD (gastroesophageal reflux disease) 2020   • Anxiety 2020   • Chronic constipation 2020   • Severe sepsis ([address] ) 2020   • Acute renal failure superimposed on chronic kidney disease ([address] ) 2020   • Paroxysmal atrial fibrillation ([address] ) 2020   • COPD with asthma ([facility] ) 2020   • Hypothyroid 2020   • Cellulitis 09/10/2020   • H/O right nephrectomy 09/10/2020   • SOB (shortness of breath) 2020   • Hyperparathyroidism ([facility] ) 2020   • Dyslipidemia 2019   • Anemia due to stage 3b chronic kidney disease ([place] [address] ) 10/04/2018   • Chronic kidney disease, stage III (moderate) ([place] [address] ) 10/04/2018 • Hypertensive chronic kidney disease with stage 1 through stage 4 chronic kidney disease, or unspecified chronic kidney disease 10/04/2018   • Persistent proteinuria 10/04/2018   • Iron deficiency 10/04/2018      LOS (days): 2  Geometric Mean LOS (GMLOS) (days): 3 50  Days to GMLOS:1 4     OBJECTIVE:  PATIENT READMITTED TO HOSPITAL  Risk of Unplanned Readmission Score: 28 19         Current admission status: Inpatient       Preferred Pharmacy:   [id number] Em Warren, [address] [place] [address],4Th Floor  [address] [facility],Fourth Floor  Phone: [phone] Fax: [phone]    Primary Care Provider: Juma Gale MD    Primary Insurance: MEDICARE  Secondary Insurance: [address]:  Active Health Care Proxies    There are no active Health Care Proxies on file  Advance Directives  Does patient have a Health Care POA?: Yes  Does patient have Advance Directives?: Yes  Advance Directives: Living will, Power of  for health care, Power of  for finance  Primary Contact: S/O Usman Reyter         Readmission Root Cause  30 Day Readmission: No    Patient Information  Admitted from[de-identified] Home  Mental Status: Alert  During Assessment patient was accompanied by: Spouse (S/O Roselia Moore)  Assessment information provided by[de-identified] Spouse  Primary Caregiver: Spouse  Caregiver's Name[de-identified] Chung Santana Relationship to Patient[de-identified] Significant Other  Support Systems: Spouse/significant other  [place] of Residence: [address] [place] [address] do you live in?: Fosters entry access options   Select all that apply : Ramp, No steps to enter home  Type of Current Residence: Apartment  Floor Level: 2  Upon entering residence, is there a bedroom on the main floor (no further steps)?: Yes  Upon entering residence, is there a bathroom on the main floor (no further steps)?: Yes  In the last 12 months, was there a time when you were not able to pay the mortgage or rent on time?: No  In the last 12 months, how many places have you lived?: 1  In the last 12 months, was there a time when you did not have a steady place to sleep or slept in a shelter (including now)?: No  Homeless/housing insecurity resource given?: N/A  Living Arrangements: Lives w/ Spouse/significant other  Is patient a ?: No    Activities of Daily Living Prior to Admission  Functional Status: Assistance  Completes ADLs independently?: No  Level of ADL dependence: Assistance  Ambulates independently?: No  Level of ambulatory dependence: Assistance  Does patient use assisted devices?: Yes  Assisted Devices (DME) used: Bedside Commode, Portable Oxygen concentrator, Home Oxygen concentrator, CPAP, Walker, Wheelchair, The DeskActive wheelchair  DME Company Name (respiratory supplies): [employer]  O2 Rate(s): 4L  Does patient currently own DME?: Yes  What DME does the patient currently own?: CPAP, Portable Oxygen concentrator, Home Oxygen concentrator, Electric Wheelchair, Malgorzata Vasquez, Wheelchair  Does patient have a history of Outpatient Therapy (PT/OT)?: No  Does the patient have a history of Short-Term Rehab?: Yes  Does patient have a history of HHC?: Yes  Does patient currently have [address]?: No         Patient Information Continued  Income Source: SSI/SSD  Does patient have prescription coverage?: Yes  Within the past 12 months, you worried that your food would run out before you got the money to buy more : Never true  Within the past 12 months, the food you bought just didn't last and you didn't have money to get more : Never true  Food insecurity resource given?: N/A  Does patient receive dialysis treatments?: No         Means of Transportation  Means of Transport to [facility][de-identified] [place]  In the past 12 months, has lack of transportation kept you from medical appointments or from getting medications?: No  In the past 12 months, has lack of transportation kept you from meetings, work, or from getting things needed for daily living?: No  Was application for public transport provided?: N/A        DISCHARGE DETAILS:    Discharge planning discussed with[de-identified] patient and S/O Derek Couch  [place] of Choice: Yes     CM contacted family/caregiver?: Yes  Were Treatment Team discharge recommendations reviewed with patient/caregiver?: Yes  Did patient/caregiver verbalize understanding of patient care needs?: Yes  Were patient/caregiver advised of the risks associated with not following Treatment Team discharge recommendations?: Yes    Contacts  Patient Contacts: Westley Savage  Relationship to Patient[de-identified] Family  Contact Method: In Person  Reason/Outcome: Discharge Planning, Referral    Requested [address]         Is the [facility] in [facility] at discharge?: Yes  [address] requested[de-identified] Nursing, Physical Therapy, Occupational [address] Name[de-identified] Other  [address] [place] [address] Provider[de-identified] PCP  Home Health Services Needed[de-identified] Evaluate Functional Status and Safety, Gait/ADL Training, COPD Management, Oxygen Via Nasal Cannula, Strengthening/Theraputic Exercises to Improve Function  Oxygen LPM Ordered (if applicable based on home health services needed):: 4 LPM  Homebound Criteria Met[de-identified] Uses an Assist Device (i e  cane, walker, etc), Requires the Assistance of Another Person for Safe Ambulation or to Leave the Home  Supporting Clincal Findings[de-identified] Requires Oxygen, Fatigues Easliy in Short Distances, Dyspnea with Exertion    DME Referral Provided  Referral made for DME?: No    Other Referral/Resources/Interventions Provided:  Interventions: [facility]  Referral Comments: CM met with patient and S/O Newport Couch, who is primary care giver, to introduce self/role, complete initial assessment and discuss dcp  Cm reviewed care team recommendation for STR - patient and SO/O declined d/t previous poor experiance and several family member having passed away d/t poor care in nursing facilities   Derek aCrlosnati requesting referral to [facility] as patient ahs had them in the past and recieved good care  Referral placed to [facility]  CM reviewed patient will likely need bipap at D/C and Carlos Phillips would like this obtained through [facility] as patient is already established with them for home oxygen and current Cpap           Treatment Team Recommendation: Short Term Rehab  Discharge Destination Plan[de-identified] Home with Fadumotad at Discharge : [address]

## 2022-11-23 NOTE — ASSESSMENT & PLAN NOTE
· Likely in the setting of acute on chronic hypoxic and hypercapnic respiratory failure, now improving  · Continue BIPAP PRN and HS  · Regulate sleep/wake  · Delirium precautions  · Frequent neuro checks and reorientation

## 2022-11-23 NOTE — ASSESSMENT & PLAN NOTE
· In the setting of COPD/asthma with acute exacerbation and recent PNA   · At baseline, requires 4L NC, O2 requirement now at baseline  · Continue BIPAP HS and PRN for SASHA/OHS, encourage compliance  · Continue scheduled nebs and steroids for COPD exacerbation - consider transition to prednisone taper today   · S/p 5 day course abx for pneumonia  · Encourage cough, deep breathing, IS, ambulation with assistance

## 2022-11-23 NOTE — ASSESSMENT & PLAN NOTE
· With acute exacerbation  · Continue scheduled xopenex, atrovent, pulmicort, perforomist  · Continue prednisone taper

## 2022-11-23 NOTE — PLAN OF CARE
Problem: OCCUPATIONAL THERAPY ADULT  Goal: Performs self-care activities at highest level of function for planned discharge setting  See evaluation for individualized goals  Description: Treatment Interventions: ADL retraining, Functional transfer training, Endurance training, Cognitive reorientation, Patient/family training, Equipment evaluation/education, Compensatory technique education, Energy conservation, Activityengagement          See flowsheet documentation for full assessment, interventions and recommendations  Note: Limitation: Decreased ADL status, Decreased UE strength, Decreased Safe judgement during ADL, Decreased endurance, Decreased self-care trans, Decreased high-level ADLs, Decreased cognition  Prognosis: Fair  Assessment: Pt is a 60 y o  female seen for OT evaluation s/p admission to [address] [place] [address] on 11/21/2022 due to weakness  Pt diagnosed with Acute on chronic respiratory failure with hypoxia and hypercapnia ([facility] )  Pt has a significant PMH impacting occupational performance including: a fib, COPD, anxiety, anxiety, CKD, obesity, DM II, leg wound  Pt with 2 recent admissions in the last 2 months  Pt with active OT evaluation and treatment orders and activity orders for Up and OOB as tolerated   PTA pt living with significant other/caretaker Constanza in apartment, pt requires (A) with ADLs and IADLs, (+)falls, (-)drives, use of w/c vs RW at baseline  Pt is motivated to return to home  Personal and environmental factors supporting pt at time of IE include social support, attitude towards recovery and accessible home environment  Personal and environmental factors inhibiting engagement in occupations include current habits and behavioral patterns, lifestyle patterns, difficulty completing ADLs and difficulty completing IADLs  During evaluation pt performed as is outlined above in flowsheet  Pt required VC for safety and VC for attention to task   Standardized assessments used to assist in identifying performance deficits include AMPAC 6-Clicks and Barthel ADL Index  Performance deficits that affect the pt’s occupational performance during the initial evaluation include impaired balance, functional mobility, endurance, activity tolerance, functional standing tolerance and overall strength, communication and social skills, safety awareness, insight into deficits and problem solving, interpersonal skills  Based on pt’s functional performance and deficits the following occupations will be addressed in OT treatments in order to maximize pt’s independence and overall occupational performance: grooming, bathing/showering, toileting and toilet hygiene, dressing and functional mobility  Goals are listed below  Upon discharge from acute care setting recommend d/c to PAR vs home with [facility] pending level of support Aracely Cintron able to provide and progress with therapy, more likely PAR at this time  This evaluation required an extensive review of medical and/or therapy records and additional review of physical, cognitive and psychosocial history related to functional performance  Based upon functional performance deficits and assessments, this evaluation has been identified as a high complexity evaluation       OT Discharge Recommendation: (S) Post acute rehabilitation services (vs home with [facility] pending level of assist Aracely Cintron is able to provide and progress with therapy)

## 2022-11-23 NOTE — PHYSICAL THERAPY NOTE
PHYSICAL THERAPY EVALUATION NOTE    Patient Name: Helena Denver  GJTAT'Y Date: 2022    AGE:   60 y o  Mrn:   [id number]  ADMIT DX:  Altered mental status [R41 [id number]]    Past Medical History:   Diagnosis Date    Anemia     Arthritis     Cancer of kidney ([facility] )     Chronic kidney disease     Diabetes mellitus ([facility] )     Disease of thyroid gland     HLD (hyperlipidemia)     Hypertension     Ovarian cancer ([place] [address] )     Pneumonia      Length Of Stay: 2  PHYSICAL THERAPY EVALUATION :   Patient's identity confirmed via 2 patient identifiers (full name and ) at start of session       22 0811   PT Last Visit   PT Visit Date 22   Note Type   Note type Evaluation   Pain Assessment   Pain Assessment Tool 0-10   Pain Score No Pain   Restrictions/Precautions   Weight Bearing Precautions Per Order No   Other Precautions Impulsive;Cognitive; Chair Alarm; Bed Alarm;O2;Telemetry;Multiple lines; Fall Risk  (4L NC O2)   Home Living   Type of [address] [place] [address] One level;Ramped entrance   Bathroom Shower/Tub Walk-in shower   Bathroom Toilet Standard   Bathroom Equipment Grab bars in [facility]; Wheelchair-manual   Additional Comments Pt reports using WC more often recently, can self propel in WC  She reports using RW for SPT to her WC (pt was previously mobilizing short household distances w/ RW)   Prior Function   Level of [place] Independent with ADLs; Independent with functional mobility   Lives With Significant other  Zev Mayorga)   Receives Help From Family   IADLs Family/Friend/Other provides transportation; Family/Friend/Other provides meals   Falls in the last 6 months 1 to 4  (at least 2 (w/ similar KATELYN as pt slides off chair onto ground))   Vocational Retired   Comments Pt is an inconsistent historian, provides conflicting info (initially states she's I for all dressing, then when asked to put her socks on by OTtysons that her SO Shaneka Jolly does that task"   General   Family/Caregiver Present No   Cognition   Overall Cognitive Status Impaired   Arousal/Participation Alert   Attention Attends with cues to redirect   Orientation Level Oriented to person;Oriented to place; Disoriented to time;Disoriented to situation   Memory Decreased short term memory;Decreased recall of recent events   Following Commands Follows one step commands with increased time or repetition   Comments Pt highly impulsive w/ poor safety awareness and decreased problem solving throughout session  Strength RLE   RLE Overall Strength 3/5   Strength LLE   LLE Overall Strength 3/5   Bed Mobility   Supine to Sit 2  Maximal assistance   Additional items Assist x 1; Increased time required;Verbal cues   Sit to Supine Unable to assess   Additional Comments Pt seated OOB in recliner chair at end of eval   Transfers   Sit to Stand 4  Minimal assistance   Additional items Assist x 1; Increased time required   Stand to Sit 4  Minimal assistance   Additional items Assist x 1; Increased time required   Additional Comments Pt is able to pass Hallie's Egress test, requiring min A x 1 (w/ SBA of 2nd person due to line managment, pt's impulsivity, and overall pt safety)   Ambulation/Elevation   Gait pattern Decreased foot clearance; Excessively slow; Short stride   Gait Assistance 4  Minimal assist   Additional items Assist x 1  (SBA of 2nd person)   Assistive Device Bariatric Rolling walker   Distance 2 ft  (steppage SPT from EOB to recliner chair)   Balance   Static Sitting Fair -   Static Standing Poor +   Ambulatory Poor +  (w/ jenn RW)   Activity Tolerance   Activity Tolerance Patient limited by fatigue  (cog/behavioral deficits)   Medical Staff Made Aware Care coordination w/ OT [address] to RN [facility]   Assessment   Problem List Decreased strength;Decreased endurance; Impaired balance;Decreased mobility; Decreased cognition; Impaired judgement;Decreased safety awareness; Obesity   Assessment Negar Joiner is a 60 y o  Female who [facility] on 11/21[facility]2022 (transfer from [facility]) from home w/ c/o generalized weakness and lethargy and diagnosis of acute on chronic respiratory failure w/ hypxoai and hypercapnia  Orders for PT eval and treat received  Pt presents w/ comorbidities of COPD, Afib, CKD Stage 3, GERD, anxiety, DMII,   At baseline, pt mobilizes supervision w/ RW vs WC for short distances, and reports + falls in the last 6 months  Upon evaluation, pt presents w/ the following deficits: weakness, impaired balance, decreased endurance and impaired safety awareness and problem solving during functional mobility  Upon eval, pt requires max a x 1 for bed mobility, min A x 1 for transfers, and min A x 1 for gait  Pt's clinical presentation is unstable/unpredictable due to need for assist w/ all phases of mobility when usually mobilizing independently, need for supplemental oxygen in order to maintain oxygen saturation, need for input for mobility technique/safety, recent drastic decline in mobility compared to baseline, recent readmission (within 30 days) and recent history of falls  Patient is at an increased risk of falls due to physical and cognitive deficits  Given the above findings, discharge recommendation is for Post-acute inpatient rehabilitation vs home w/ HHPT and family support pending level of A pt's SO/caregiver can provide, coupled w/ continued mobility progress  During this admission, pt would benefit from continued skilled inpatient PT in the acute care setting in order to address the abovementioned deficits to maximize function and mobility before DC from acute care  Goals   Patient Goals to go home   STG Expiration Date 12/03/22   Short Term Goal #1 Patient will:  Increase bilateral LE strength 1/2 grade to facilitate independent mobility, Perform all bed mobility tasks w/ supervision to decrease fall risk factors, Perform all transfers w/ supervision to improve independence, Ambulate at least 20 ft  with roller walker w/ supervision w/o LOB, Increase all balance 1/2 grade to decrease risk for falls, Complete exercise program independently and Tolerate 3 hr OOB to faciliate upright tolerance   PT Treatment Day 0   Plan   Treatment/Interventions Functional transfer training;LE strengthening/ROM; Therapeutic exercise; Endurance training;Cognitive reorientation;Patient/family training;Equipment eval/education; Bed mobility;Gait training   PT Frequency 3-5x/wk   Recommendation   PT Discharge Recommendation Post acute rehabilitation services   Equipment Recommended Walker; Wheelchair   AM-PAC Basic Mobility Inpatient   Turning in Bed Without Bedrails 2   Lying on Back to Sitting on Edge of Flat Bed 2   Moving Bed to Chair 3   Standing Up From Chair 3   Walk in Room 3   Climb 3-5 Stairs 1   Basic Mobility Inpatient Raw Score 14   Basic Mobility Standardized Score 35 55   Highest Level Of Mobility   JH-HLM Goal 4: Move to chair/commode   JH-HLM Achieved 4: Move to chair/commode   Additional Treatment Session   Start Time 0817   End Time 0825   Treatment Assessment Pt seated OOB in recliner chair  Pt c/o not being comfortable  Of note, pt w/ extended gluteal shelf (which may be why she's sliding out of her recliner chair at home) which impacts the way she is positioned in the bariatric recliner chair  Pt asking to be wheeled over to the couch so she can put her "legs up and push back"  Provided education that this isn't a safe method of repositioning and would benefit from STS again  Pt is able to perform STS from reclinerchair x 2 w/ min A x 1 for each and BUE support on jenn RW  Pt is able to stand x 1 min each trial (2 min overall) and reposition self as needed   Pt agreeable to sit OOB in recliner chair and eat breakfast  Set chair alarm, provided call bell and instructed pt to ring for RN staff when ready to return to bed   Equipment Use [place]   Additional Treatment Day 1   End of Consult   Patient Position at End of Consult Bedside chair;Bed/Chair alarm activated; All needs within reach         The patient's AM-PAC Basic Mobility Inpatient Short Form Raw Score is 14, Standardized Score is 35 55  A standardized score less than 38 32 (raw score of 16) suggests the patient may benefit from discharge to post-acute rehabilitation services which may not coincide with above PT recommendations   However please refer to therapist recommendation for discharge planning given other factors that may influence destination    Pt would benefit from skilled inpatient PT during this admission in order to facilitate progress towards goals to maximize functional independence    Dominga Sesay, PT, DPT

## 2022-11-24 LAB
ABO GROUP BLD: NORMAL
ANION GAP SERPL CALCULATED.3IONS-SCNC: 4 MMOL/L (ref 4–13)
ANISOCYTOSIS BLD QL SMEAR: PRESENT
BASOPHILS # BLD MANUAL: 0 THOUSAND/UL (ref 0–0.1)
BASOPHILS NFR MAR MANUAL: 0 % (ref 0–1)
BLD GP AB SCN SERPL QL: NEGATIVE
BUN SERPL-MCNC: 64 MG/DL (ref 5–25)
CALCIUM SERPL-MCNC: 9.4 MG/DL (ref 8.4–10.2)
CHLORIDE SERPL-SCNC: 96 MMOL/L (ref 96–108)
CO2 SERPL-SCNC: 41 MMOL/L (ref 21–32)
CREAT SERPL-MCNC: 1.54 MG/DL (ref 0.6–1.3)
EOSINOPHIL # BLD MANUAL: 0 THOUSAND/UL (ref 0–0.4)
EOSINOPHIL NFR BLD MANUAL: 0 % (ref 0–6)
ERYTHROCYTE [DISTWIDTH] IN BLOOD BY AUTOMATED COUNT: 16.8 % (ref 11.6–15.1)
GFR SERPL CREATININE-BSD FRML MDRD: 35 ML/MIN/1.73SQ M
GLUCOSE SERPL-MCNC: 175 MG/DL (ref 65–140)
GLUCOSE SERPL-MCNC: 216 MG/DL (ref 65–140)
GLUCOSE SERPL-MCNC: 224 MG/DL (ref 65–140)
GLUCOSE SERPL-MCNC: 255 MG/DL (ref 65–140)
GLUCOSE SERPL-MCNC: 259 MG/DL (ref 65–140)
HCT VFR BLD AUTO: 26.7 % (ref 34.8–46.1)
HEMOCCULT STL QL: POSITIVE
HGB BLD-MCNC: 7.1 G/DL (ref 11.5–15.4)
HGB BLD-MCNC: 7.4 G/DL (ref 11.5–15.4)
HGB BLD-MCNC: 7.7 G/DL (ref 11.5–15.4)
INR PPP: 3.32 (ref 0.84–1.19)
LYMPHOCYTES # BLD AUTO: 0.73 THOUSAND/UL (ref 0.6–4.47)
LYMPHOCYTES # BLD AUTO: 9 % (ref 14–44)
MAGNESIUM SERPL-MCNC: 1.8 MG/DL (ref 1.9–2.7)
MCH RBC QN AUTO: 30.3 PG (ref 26.8–34.3)
MCHC RBC AUTO-ENTMCNC: 28.8 G/DL (ref 31.4–37.4)
MCV RBC AUTO: 105 FL (ref 82–98)
MONOCYTES # BLD AUTO: 0.24 THOUSAND/UL (ref 0–1.22)
MONOCYTES NFR BLD: 3 % (ref 4–12)
MYELOCYTES NFR BLD MANUAL: 2 % (ref 0–1)
NEUTROPHILS # BLD MANUAL: 6.96 THOUSAND/UL (ref 1.85–7.62)
NEUTS SEG NFR BLD AUTO: 86 % (ref 43–75)
PHOSPHATE SERPL-MCNC: 2.5 MG/DL (ref 2.3–4.1)
PLATELET # BLD AUTO: 246 THOUSANDS/UL (ref 149–390)
PLATELET BLD QL SMEAR: ADEQUATE
PMV BLD AUTO: 10.2 FL (ref 8.9–12.7)
POLYCHROMASIA BLD QL SMEAR: PRESENT
POTASSIUM SERPL-SCNC: 5 MMOL/L (ref 3.5–5.3)
PROTHROMBIN TIME: 34 SECONDS (ref 11.6–14.5)
RBC # BLD AUTO: 2.54 MILLION/UL (ref 3.81–5.12)
RBC MORPH BLD: PRESENT
RH BLD: POSITIVE
SODIUM SERPL-SCNC: 141 MMOL/L (ref 135–147)
SPECIMEN EXPIRATION DATE: NORMAL
WBC # BLD AUTO: 8.09 THOUSAND/UL (ref 4.31–10.16)

## 2022-11-24 PROCEDURE — 30233N1 TRANSFUSION OF NONAUTOLOGOUS RED BLOOD CELLS INTO PERIPHERAL VEIN, PERCUTANEOUS APPROACH: ICD-10-PCS | Performed by: HOSPITALIST

## 2022-11-24 RX ORDER — PREDNISONE 10 MG/1
10 TABLET ORAL DAILY
Status: DISCONTINUED | OUTPATIENT
Start: 2022-12-01 | End: 2022-11-29

## 2022-11-24 RX ORDER — ONDANSETRON 2 MG/ML
4 INJECTION INTRAMUSCULAR; INTRAVENOUS EVERY 8 HOURS PRN
Status: DISCONTINUED | OUTPATIENT
Start: 2022-11-24 | End: 2022-12-03 | Stop reason: HOSPADM

## 2022-11-24 RX ORDER — PANTOPRAZOLE SODIUM 40 MG/10ML
40 INJECTION, POWDER, LYOPHILIZED, FOR SOLUTION INTRAVENOUS EVERY 12 HOURS SCHEDULED
Status: DISCONTINUED | OUTPATIENT
Start: 2022-11-24 | End: 2022-11-24

## 2022-11-24 RX ORDER — FUROSEMIDE 10 MG/ML
40 INJECTION INTRAMUSCULAR; INTRAVENOUS ONCE
Status: COMPLETED | OUTPATIENT
Start: 2022-11-24 | End: 2022-11-24

## 2022-11-24 RX ORDER — MAGNESIUM SULFATE HEPTAHYDRATE 40 MG/ML
2 INJECTION, SOLUTION INTRAVENOUS ONCE
Status: COMPLETED | OUTPATIENT
Start: 2022-11-24 | End: 2022-11-24

## 2022-11-24 RX ORDER — PREDNISONE 20 MG/1
40 TABLET ORAL DAILY
Status: COMPLETED | OUTPATIENT
Start: 2022-11-25 | End: 2022-11-27

## 2022-11-24 RX ADMIN — INSULIN LISPRO 6 UNITS: 100 INJECTION, SOLUTION INTRAVENOUS; SUBCUTANEOUS at 22:18

## 2022-11-24 RX ADMIN — FORMOTEROL FUMARATE DIHYDRATE 20 MCG: 20 SOLUTION RESPIRATORY (INHALATION) at 08:20

## 2022-11-24 RX ADMIN — BUDESONIDE 0.5 MG: 0.5 INHALANT ORAL at 19:59

## 2022-11-24 RX ADMIN — PANTOPRAZOLE SODIUM 40 MG: 40 INJECTION, POWDER, FOR SOLUTION INTRAVENOUS at 08:13

## 2022-11-24 RX ADMIN — IPRATROPIUM BROMIDE 0.5 MG: 0.5 SOLUTION RESPIRATORY (INHALATION) at 19:59

## 2022-11-24 RX ADMIN — LEVALBUTEROL HYDROCHLORIDE 1.25 MG: 1.25 SOLUTION, CONCENTRATE RESPIRATORY (INHALATION) at 19:59

## 2022-11-24 RX ADMIN — MAGNESIUM SULFATE HEPTAHYDRATE 2 G: 40 INJECTION, SOLUTION INTRAVENOUS at 08:10

## 2022-11-24 RX ADMIN — BUDESONIDE 0.5 MG: 0.5 INHALANT ORAL at 07:33

## 2022-11-24 RX ADMIN — METHYLPREDNISOLONE SODIUM SUCCINATE 40 MG: 40 INJECTION, POWDER, FOR SOLUTION INTRAMUSCULAR; INTRAVENOUS at 08:12

## 2022-11-24 RX ADMIN — LEVALBUTEROL HYDROCHLORIDE 1.25 MG: 1.25 SOLUTION, CONCENTRATE RESPIRATORY (INHALATION) at 13:25

## 2022-11-24 RX ADMIN — IPRATROPIUM BROMIDE 0.5 MG: 0.5 SOLUTION RESPIRATORY (INHALATION) at 07:33

## 2022-11-24 RX ADMIN — METOPROLOL TARTRATE 50 MG: 50 TABLET, FILM COATED ORAL at 08:17

## 2022-11-24 RX ADMIN — FUROSEMIDE 40 MG: 10 INJECTION, SOLUTION INTRAMUSCULAR; INTRAVENOUS at 09:30

## 2022-11-24 RX ADMIN — IPRATROPIUM BROMIDE 0.5 MG: 0.5 SOLUTION RESPIRATORY (INHALATION) at 13:25

## 2022-11-24 RX ADMIN — FORMOTEROL FUMARATE DIHYDRATE 20 MCG: 20 SOLUTION RESPIRATORY (INHALATION) at 19:59

## 2022-11-24 RX ADMIN — COLLAGENASE SANTYL 1 APPLICATION: 250 OINTMENT TOPICAL at 08:21

## 2022-11-24 RX ADMIN — LEVOTHYROXINE SODIUM 100 MCG: 100 TABLET ORAL at 05:55

## 2022-11-24 RX ADMIN — SODIUM CHLORIDE 8 MG/HR: 900 INJECTION, SOLUTION INTRAVENOUS at 14:07

## 2022-11-24 RX ADMIN — ATORVASTATIN CALCIUM 10 MG: 10 TABLET, FILM COATED ORAL at 08:17

## 2022-11-24 RX ADMIN — GUAIFENESIN 1200 MG: 600 TABLET, EXTENDED RELEASE ORAL at 08:17

## 2022-11-24 RX ADMIN — AMLODIPINE BESYLATE 5 MG: 5 TABLET ORAL at 08:17

## 2022-11-24 RX ADMIN — ONDANSETRON 4 MG: 2 INJECTION INTRAMUSCULAR; INTRAVENOUS at 01:37

## 2022-11-24 RX ADMIN — METOPROLOL TARTRATE 50 MG: 50 TABLET, FILM COATED ORAL at 22:18

## 2022-11-24 RX ADMIN — GUAIFENESIN 1200 MG: 600 TABLET, EXTENDED RELEASE ORAL at 17:28

## 2022-11-24 RX ADMIN — Medication 6 MG: at 22:18

## 2022-11-24 RX ADMIN — LEVALBUTEROL HYDROCHLORIDE 1.25 MG: 1.25 SOLUTION, CONCENTRATE RESPIRATORY (INHALATION) at 07:33

## 2022-11-24 NOTE — ASSESSMENT & PLAN NOTE
· With acute exacerbation  · Continue scheduled xopenex, atrovent, pulmicort, perforomist  · Consider transition of IV steroids to prednisone taper

## 2022-11-24 NOTE — PROGRESS NOTES
[facility]  Progress Note - Zeinab Nickerson 1960, 60 y o  female MRN: [id number]  Unit/Bed#: ICU 06 Encounter: [id number]  Primary Care Provider: Sonam Coronel MD   Date and time admitted to hospital: 11/21/2022 12:58 PM    * Acute on chronic respiratory failure with hypoxia and hypercapnia (HCC)  Assessment & Plan  · In the setting of COPD/asthma with acute exacerbation and recent PNA   · At baseline, requires 4L NC, O2 requirement now at baseline  · Continue BIPAP HS and PRN for SASHA/OHS, encourage compliance  · Continue scheduled nebs and steroids for COPD exacerbation - consider transition to prednisone taper today   · S/p 5 day course abx for pneumonia  · Encourage cough, deep breathing, IS, ambulation with assistance     Acute metabolic encephalopathy  Assessment & Plan  · Likely in the setting of acute on chronic hypoxic and hypercapnic respiratory failure, now improving  · Continue BIPAP PRN and HS  · Regulate sleep/wake  · Delirium precautions  · Frequent neuro checks and reorientation     COPD with asthma ([facility] [address] )  Assessment & Plan  · With acute exacerbation  · Continue scheduled xopenex, atrovent, pulmicort, perforomist  · Consider transition of IV steroids to prednisone taper     Pneumonia due to infectious organism  Assessment & Plan  · Diagnosed as OP  · S/p 5 days abx as OP and continued on admssion  · Patient remains afebrile, without leukocytosis, procal negative   · Monitor off abx     Panniculitis  Assessment & Plan  · 11/20 CT C/A/P: acute panniculitis, not appreciated on physical exam  · Monitor off abx      Bilateral pleural effusion  Assessment & Plan  · S/p diuresis with 40mg IV lasix daily  · Would continue PRN diuresis for goal -500ml over 24 hours     Leg wound, left, initial encounter  Assessment & Plan  · Continue local wound care  · Wound nurse following     Diabetes mellitus type 2 in [facility])  Assessment & Plan  Lab Results   Component Value Date HGBA1C 7 0 (H) 11/01/2022       Recent Labs     11/23/22  0532 11/23/22  1104 11/23/22  1753 11/23/22  2108   POCGLU 164* 163* 202* 219*       Blood Sugar Average: Last 72 hrs:    · ACHS glucose checks and SSI for goal glucose 140-180    Morbid (severe) obesity due to excess calories ([facility])  Assessment & Plan  · Lifestyle modifications  · Nutrition consulte    Anxiety  Assessment & Plan  · PRN attarax HS     GERD (gastroesophageal reflux disease)  Assessment & Plan  · Continue home pepcid    Hypothyroid  Assessment & Plan  · Continue home levothyroxine     Paroxysmal atrial fibrillation ([facility])  Assessment & Plan  · Currently rate controlled  · Continue home metoprolol  · Hold home coumadin with supratherapeutic INR, restart when INR is therapeutic       Dyslipidemia  Assessment & Plan  · Continue home statin     Chronic kidney disease, stage III (moderate) ([facility])  Assessment & Plan  Lab Results   Component Value Date    EGFR 35 11/24/2022    EGFR 31 11/23/2022    EGFR 33 11/22/2022    CREATININE 1 54 (H) 11/24/2022    CREATININE 1 69 (H) 11/23/2022    CREATININE 1 62 (H) 11/22/2022     · Baseline Cr: 1 8 - 2 4  · Currently at baseline  · Continue to monitor I/O and renal indices  · Continue PRN diuresis for goal negative 500ml/24 hours           ----------------------------------------------------------------------------------------  HPI/24hr events: No acute events  Refused BIPAP overnight  Remains on 4L NC     Patient appropriate for transfer out of the ICU today?: Patient does not meet criteria for ICU Follow-up Clinic; referral has not been made  Disposition: Transfer to Med-Surg   Code Status: Level 1 - Full Code  ---------------------------------------------------------------------------------------  SUBJECTIVE  "I feel good"    Review of Systems   Constitutional: Negative  HENT: Negative  Eyes: Negative  Respiratory: Negative  Cardiovascular: Negative  Gastrointestinal: Negative      Endocrine: Negative  Genitourinary: Negative  Musculoskeletal: Negative  Skin: Negative  Allergic/Immunologic: Negative  Neurological: Negative  Hematological: Negative  Psychiatric/Behavioral: Negative  Review of systems was reviewed and negative unless stated above in HPI/24-hour events   ---------------------------------------------------------------------------------------  OBJECTIVE    Vitals   Vitals:    22 2311 22 2327 22 0242 22 0733   BP: 120/65 120/65 125/65    BP Location: Left arm  Left arm    Pulse: (!) 119 (!) 116 (!) 106    Resp: (!) 39 (!) 25 (!) 27    Temp: 98 5 °F (36 9 °C)  97 8 °F (36 6 °C)    TempSrc: Oral  Oral    SpO2: 92% 93% 90% 94%   Weight:   (!) 146 kg (321 lb 3 4 oz)    Height:         Temp (24hrs), Av 9 °F (36 6 °C), Min:97 1 °F (36 2 °C), Max:98 5 °F (36 9 °C)  Current: Temperature: 97 8 °F (36 6 °C)  Arterial Line BP: 130/73  Arterial Line MAP (mmHg): 92 mmHg    Respiratory:  SpO2: SpO2: 94 %, SpO2 Device: O2 Device: Nasal cannula  Nasal Cannula O2 Flow Rate (L/min): 4 L/min    Invasive/non-invasive ventilation settings   Respiratory    Lab Data (Last 4 hours)    None         O2/Vent Data (Last 4 hours)    None                Physical Exam  Constitutional:       General: She is not in acute distress  Appearance: She is ill-appearing  HENT:      Head: Normocephalic and atraumatic  Mouth/Throat:      Mouth: Mucous membranes are moist    Eyes:      Pupils: Pupils are equal, round, and reactive to light  Cardiovascular:      Rate and Rhythm: Normal rate  Rhythm irregular  Pulses: Normal pulses  Heart sounds: Normal heart sounds  No murmur heard  No friction rub  No gallop  Pulmonary:      Effort: Pulmonary effort is normal  No respiratory distress  Breath sounds: No wheezing, rhonchi or rales  Comments: diminished  Abdominal:      General: Bowel sounds are normal  There is no distension        Palpations: Abdomen is soft  Tenderness: There is no abdominal tenderness  Musculoskeletal:         General: Swelling present  Normal range of motion  Cervical back: Neck supple  Right lower leg: Edema present  Left lower leg: Edema present  Skin:     General: Skin is warm and dry  Capillary Refill: Capillary refill takes less than 2 seconds  Neurological:      General: No focal deficit present  Mental Status: She is alert and oriented to person, place, and time  Cranial Nerves: No cranial nerve deficit  Sensory: No sensory deficit  Motor: No weakness               Laboratory and Diagnostics:  Results from last 7 days   Lab Units 11/24/22  0550 11/23/22  0356 11/22/22  0405 11/21/22  0507 11/20/22  1432   WBC Thousand/uL 8 09 8 23 4 95 7 37 7 40   HEMOGLOBIN g/dL 7 7* 8 6* 8 0* 8 2* 9 0*   HEMATOCRIT % 26 7* 30 8* 28 6* 30 7* 32 7*   PLATELETS Thousands/uL 246 236 239 242 269   NEUTROS PCT %  --  80*  --   --   --    BANDS PCT %  --   --   --   --  4   MONOS PCT %  --  8  --   --   --    MONO PCT %  --   --   --   --  8     Results from last 7 days   Lab Units 11/24/22  0550 11/23/22  0356 11/22/22  1147 11/22/22  0405 11/21/22  0507 11/20/22  1432   SODIUM mmol/L 141 139 140 140 140 139   POTASSIUM mmol/L 5 0 4 6 5 1 5 0 4 7 5 1   CHLORIDE mmol/L 96 97 99 101 101 101   CO2 mmol/L 41* 37* 35* 33* 35* 37*   ANION GAP mmol/L 4 5 6 6 4 1*   BUN mg/dL 64* 38* 31* 28* 21 20   CREATININE mg/dL 1 54* 1 69* 1 62* 1 69* 1 63* 1 69*   CALCIUM mg/dL 9 4 9 2 9 0 8 6 8 9 9 1   GLUCOSE RANDOM mg/dL 216* 161* 173* 143* 109 121   ALT U/L  --  9  --  7 6* 6*   AST U/L  --  15  --  11* 11* 11*   ALK PHOS U/L  --  118*  --  117* 131* 139*   ALBUMIN g/dL  --  3 1*  --  2 8* 3 0* 3 3*   TOTAL BILIRUBIN mg/dL  --  0 28  --  0 21 0 26 0 30     Results from last 7 days   Lab Units 11/24/22  0550 11/23/22  0356 11/22/22  0405 11/21/22  0507   MAGNESIUM mg/dL 1 8* 2 0 1 7* 1 9   PHOSPHORUS mg/dL 2 5 2 7 3 4 3 8      Results from last 7 days   Lab Units 11/24/22  0550 11/23/22  0356 11/22/22  0405 11/21/22  0507 11/20/22  1432   INR  3 32* 2 88* 2 78* 2 93* 2 75*   PTT seconds  --   --   --   --  58*          Results from last 7 days   Lab Units 11/22/22  1147 11/20/22  1432   LACTIC ACID mmol/L 1 9 0 5     ABG:  Results from last 7 days   Lab Units 11/22/22  1001   PH ART  7 341*   PCO2 ART mm Hg 37 2   PO2 ART mm Hg 95 4   HCO3 ART mmol/L 19 7*   BASE EXC ART mmol/L -5 6   ABG SOURCE  Line, Arterial     VBG:  Results from last 7 days   Lab Units 11/23/22  0912 11/22/22  1148 11/22/22  1001   PH CISCO  7 336   < >  --    PCO2 CISCO mm Hg 80 1*   < >  --    PO2 CISCO mm Hg 68 3*   < >  --    HCO3 CISCO mmol/L 41 9*   < >  --    BASE EXC CISCO mmol/L 13 6   < >  --    ABG SOURCE   --   --  Line, Arterial    < > = values in this interval not displayed  Results from last 7 days   Lab Units 11/21/22  0507 11/20/22  1432   PROCALCITONIN ng/ml 0 16 0 18       Micro  Results from last 7 days   Lab Units 11/20/22  1528 11/20/22  1441 11/20/22  1432   BLOOD CULTURE   --  No Growth at 72 hrs  No Growth at 72 hrs  URINE CULTURE  No Growth <1000 cfu/mL  --   --    LEGIONELLA URINARY ANTIGEN  Negative  --   --    STREP PNEUMONIAE ANTIGEN, URINE  Negative  --   --        EKG: Afib rate 90  Imaging: I have personally reviewed pertinent reports  and I have personally reviewed pertinent films in PACS    Intake and Output  I/O       11/22 0701 11/23 0700 11/23 0701  11/24 0700 11/24 0701 11/25 0700    P  O   480     I V  (mL/kg) 322 2 (2)      IV Piggyback 50      Total Intake(mL/kg) 372 2 (2 3) 480 (3 3)     Urine (mL/kg/hr) 3450 (0 9) 2168 (0 6)     Stool  0     Total Output 3450 2168     Net -3077 8 -1688            Unmeasured Urine Occurrence  3 x     Unmeasured Stool Occurrence  2 x           Height and Weights   Height: 5' 4" (162 6 cm)     Body mass index is 55 14 kg/m²    Weight (last 2 days)     Date/Time Weight    11/24/22 0242 146 (321 21)    11/23/22 0333 161 (354 72)    11/22/22 0227 153 (336 42)            Nutrition       Diet Orders   (From admission, onward)             Start     Ordered    11/23/22 2229  Diet Enrrique/CHO Controlled; Consistent Carbohydrate Diet Level 1 (4 carb servings/60 grams CHO/meal)  Diet effective now        References:    Nutrtion Support Algorithm Enteral vs  Parenteral   Question Answer Comment   Diet Type Enrrique/CHO Controlled    Enrrique/CHO Controlled Consistent Carbohydrate Diet Level 1 (4 carb servings/60 grams CHO/meal)    RD to adjust diet per protocol?  Yes        11/23/22 2228                  Active Medications  Scheduled Meds:  Current Facility-Administered Medications   Medication Dose Route Frequency Provider Last Rate   • acetaminophen  650 mg Oral Q6H PRN TULIO Rodriguez     • amLODIPine  5 mg Oral Daily TULIO Rodriguez     • atorvastatin  10 mg Oral Daily TULIO Rodriguez     • budesonide  0 5 mg Nebulization Q12H Mulugeta Lucero MD     • collagenase   Topical Daily TULIO Rodriguez     • famotidine  10 mg Oral Daily TULIO Rodriguez     • formoterol  20 mcg Nebulization Q12H Mulugeta Lucero MD     • guaiFENesin  1,200 mg Oral BID TULIO Rodriguez     • hydrOXYzine HCL  25 mg Oral HS PRN TULIO Chin     • insulin lispro  2-12 Units Subcutaneous TID AC TULIO Cordon     • insulin lispro  2-12 Units Subcutaneous HS TULIO Cordon     • ipratropium  0 5 mg Nebulization TID TULIO Rodriguez     • levalbuterol  1 25 mg Nebulization TID TULIO Rodriguez     • levothyroxine  100 mcg Oral Early Morning TULIO Rodriguez     • magnesium sulfate  2 g Intravenous Once TULIO Chin     • melatonin  6 mg Oral HS Beau Duque     • methylPREDNISolone sodium succinate  40 mg Intravenous Q12H [address] TULIO Cordon     • metoprolol tartrate  50 mg Oral BID Shadi Babb MD     • ondansetron  4 mg Intravenous Q8H PRN TULIO Downs     • polyethylene glycol  17 g Oral Daily TULIO Cordon     • senna  1 tablet Oral BID TULIO Cordon       Continuous Infusions:     PRN Meds:   acetaminophen, 650 mg, Q6H PRN  hydrOXYzine HCL, 25 mg, HS PRN  ondansetron, 4 mg, Q8H PRN        Invasive Devices Review  Invasive Devices     Peripheral Intravenous Line  Duration           Peripheral IV 11/20/22 Left Antecubital 3 days    Peripheral IV 11/24/22 Right;Ventral (anterior) Forearm <1 day          Drain  Duration           External Urinary Catheter 2 days              ---------------------------------------------------------------------------------------  Advance Directive and Living Will:      Power of :    POLST:    ---------------------------------------------------------------------------------------  Care Time Delivered:   No Critical Care time spent       [employer], CRNP      Portions of the record may have been created with voice recognition software  Occasional wrong word or "sound a like" substitutions may have occurred due to the inherent limitations of voice recognition software    Read the chart carefully and recognize, using context, where substitutions have occurred

## 2022-11-24 NOTE — QUICK NOTE
Pt non-compliant with bipap  Refusing to wear it any longer  Discussed goals of care and again and pt just wants to do what she wants to do and has no regard for potential outcomes

## 2022-11-24 NOTE — ASSESSMENT & PLAN NOTE
Lab Results   Component Value Date    HGBA1C 7 0 (H) 11/01/2022       Recent Labs     11/23/22  0532 11/23/22  1104 11/23/22  1753 11/23/22 2108   POCGLU 164* 163* 202* 219*       Blood Sugar Average: Last 72 hrs:    · ACHS glucose checks and SSI for goal glucose 140-180

## 2022-11-24 NOTE — ASSESSMENT & PLAN NOTE
· Currently rate controlled  · Continue home metoprolol  · Hold home coumadin with supratherapeutic INR, restart when INR is therapeutic

## 2022-11-24 NOTE — ASSESSMENT & PLAN NOTE
Lab Results   Component Value Date    EGFR 35 11/24/2022    EGFR 31 11/23/2022    EGFR 33 11/22/2022    CREATININE 1 54 (H) 11/24/2022    CREATININE 1 69 (H) 11/23/2022    CREATININE 1 62 (H) 11/22/2022     · Baseline Cr: 1 8 - 2 4  · Currently at baseline  · Continue to monitor I/O and renal indices  · Continue PRN diuresis for goal negative 500ml/24 hours

## 2022-11-25 ENCOUNTER — APPOINTMENT (INPATIENT)
Dept: GASTROENTEROLOGY | Facility: HOSPITAL | Age: 62
End: 2022-11-25

## 2022-11-25 ENCOUNTER — ANESTHESIA (INPATIENT)
Dept: GASTROENTEROLOGY | Facility: HOSPITAL | Age: 62
End: 2022-11-25

## 2022-11-25 ENCOUNTER — ANESTHESIA EVENT (INPATIENT)
Dept: GASTROENTEROLOGY | Facility: HOSPITAL | Age: 62
End: 2022-11-25

## 2022-11-25 PROBLEM — Z90.710 HISTORY OF HYSTERECTOMY: Status: ACTIVE | Noted: 2022-11-25

## 2022-11-25 LAB
ABO GROUP BLD BPU: NORMAL
ABO GROUP BLD BPU: NORMAL
ANION GAP SERPL CALCULATED.3IONS-SCNC: 4 MMOL/L (ref 4–13)
ANISOCYTOSIS BLD QL SMEAR: PRESENT
BACTERIA BLD CULT: NORMAL
BACTERIA BLD CULT: NORMAL
BASOPHILS # BLD MANUAL: 0 THOUSAND/UL (ref 0–0.1)
BASOPHILS NFR MAR MANUAL: 0 % (ref 0–1)
BPU ID: NORMAL
BPU ID: NORMAL
BUN SERPL-MCNC: 98 MG/DL (ref 5–25)
CALCIUM SERPL-MCNC: 9.3 MG/DL (ref 8.4–10.2)
CHLORIDE SERPL-SCNC: 98 MMOL/L (ref 96–108)
CO2 SERPL-SCNC: 39 MMOL/L (ref 21–32)
CREAT SERPL-MCNC: 1.51 MG/DL (ref 0.6–1.3)
CROSSMATCH: NORMAL
CROSSMATCH: NORMAL
EOSINOPHIL # BLD MANUAL: 0.26 THOUSAND/UL (ref 0–0.4)
EOSINOPHIL NFR BLD MANUAL: 2 % (ref 0–6)
ERYTHROCYTE [DISTWIDTH] IN BLOOD BY AUTOMATED COUNT: 20 % (ref 11.6–15.1)
GFR SERPL CREATININE-BSD FRML MDRD: 36 ML/MIN/1.73SQ M
GLUCOSE SERPL-MCNC: 186 MG/DL (ref 65–140)
GLUCOSE SERPL-MCNC: 188 MG/DL (ref 65–140)
GLUCOSE SERPL-MCNC: 208 MG/DL (ref 65–140)
GLUCOSE SERPL-MCNC: 230 MG/DL (ref 65–140)
GLUCOSE SERPL-MCNC: 235 MG/DL (ref 65–140)
GLUCOSE SERPL-MCNC: 243 MG/DL (ref 65–140)
GLUCOSE SERPL-MCNC: 258 MG/DL (ref 65–140)
HCT VFR BLD AUTO: 26.9 % (ref 34.8–46.1)
HGB BLD-MCNC: 6.6 G/DL (ref 11.5–15.4)
HGB BLD-MCNC: 6.7 G/DL (ref 11.5–15.4)
HGB BLD-MCNC: 7.1 G/DL (ref 11.5–15.4)
HGB BLD-MCNC: 7.6 G/DL (ref 11.5–15.4)
HGB BLD-MCNC: 8 G/DL (ref 11.5–15.4)
INR PPP: 2.08 (ref 0.84–1.19)
INR PPP: 2.54 (ref 0.84–1.19)
INR PPP: 2.83 (ref 0.84–1.19)
LYMPHOCYTES # BLD AUTO: 17 % (ref 14–44)
LYMPHOCYTES # BLD AUTO: 2.23 THOUSAND/UL (ref 0.6–4.47)
MAGNESIUM SERPL-MCNC: 2.3 MG/DL (ref 1.9–2.7)
MCH RBC QN AUTO: 29.9 PG (ref 26.8–34.3)
MCHC RBC AUTO-ENTMCNC: 29.7 G/DL (ref 31.4–37.4)
MCV RBC AUTO: 100 FL (ref 82–98)
METAMYELOCYTES NFR BLD MANUAL: 2 % (ref 0–1)
MONOCYTES # BLD AUTO: 0.92 THOUSAND/UL (ref 0–1.22)
MONOCYTES NFR BLD: 7 % (ref 4–12)
MYELOCYTES NFR BLD MANUAL: 1 % (ref 0–1)
NEUTROPHILS # BLD MANUAL: 9.29 THOUSAND/UL (ref 1.85–7.62)
NEUTS BAND NFR BLD MANUAL: 6 % (ref 0–8)
NEUTS SEG NFR BLD AUTO: 65 % (ref 43–75)
PHOSPHATE SERPL-MCNC: 2.8 MG/DL (ref 2.3–4.1)
PLATELET # BLD AUTO: 223 THOUSANDS/UL (ref 149–390)
PLATELET BLD QL SMEAR: ADEQUATE
PMV BLD AUTO: 10.8 FL (ref 8.9–12.7)
POLYCHROMASIA BLD QL SMEAR: PRESENT
POTASSIUM SERPL-SCNC: 5.2 MMOL/L (ref 3.5–5.3)
PROTHROMBIN TIME: 23.6 SECONDS (ref 11.6–14.5)
PROTHROMBIN TIME: 27.6 SECONDS (ref 11.6–14.5)
PROTHROMBIN TIME: 30.1 SECONDS (ref 11.6–14.5)
RBC # BLD AUTO: 2.68 MILLION/UL (ref 3.81–5.12)
RBC MORPH BLD: PRESENT
SODIUM SERPL-SCNC: 141 MMOL/L (ref 135–147)
UNIT DISPENSE STATUS: NORMAL
UNIT DISPENSE STATUS: NORMAL
UNIT PRODUCT CODE: NORMAL
UNIT PRODUCT CODE: NORMAL
UNIT PRODUCT VOLUME: 350 ML
UNIT PRODUCT VOLUME: 350 ML
UNIT RH: NORMAL
UNIT RH: NORMAL
WBC # BLD AUTO: 13.09 THOUSAND/UL (ref 4.31–10.16)

## 2022-11-25 PROCEDURE — 0W3P8ZZ CONTROL BLEEDING IN GASTROINTESTINAL TRACT, VIA NATURAL OR ARTIFICIAL OPENING ENDOSCOPIC: ICD-10-PCS | Performed by: INTERNAL MEDICINE

## 2022-11-25 PROCEDURE — 30233L1 TRANSFUSION OF NONAUTOLOGOUS FRESH PLASMA INTO PERIPHERAL VEIN, PERCUTANEOUS APPROACH: ICD-10-PCS | Performed by: HOSPITALIST

## 2022-11-25 PROCEDURE — 30233N1 TRANSFUSION OF NONAUTOLOGOUS RED BLOOD CELLS INTO PERIPHERAL VEIN, PERCUTANEOUS APPROACH: ICD-10-PCS | Performed by: HOSPITALIST

## 2022-11-25 RX ORDER — SODIUM CHLORIDE, SODIUM LACTATE, POTASSIUM CHLORIDE, CALCIUM CHLORIDE 600; 310; 30; 20 MG/100ML; MG/100ML; MG/100ML; MG/100ML
INJECTION, SOLUTION INTRAVENOUS CONTINUOUS PRN
Status: DISCONTINUED | OUTPATIENT
Start: 2022-11-25 | End: 2022-11-25

## 2022-11-25 RX ORDER — SUCCINYLCHOLINE/SOD CL,ISO/PF 100 MG/5ML
SYRINGE (ML) INTRAVENOUS AS NEEDED
Status: DISCONTINUED | OUTPATIENT
Start: 2022-11-25 | End: 2022-11-25

## 2022-11-25 RX ORDER — FENTANYL CITRATE/PF 50 MCG/ML
25 SYRINGE (ML) INJECTION
Status: DISCONTINUED | OUTPATIENT
Start: 2022-11-25 | End: 2022-11-25 | Stop reason: HOSPADM

## 2022-11-25 RX ORDER — SODIUM CHLORIDE, SODIUM LACTATE, POTASSIUM CHLORIDE, CALCIUM CHLORIDE 600; 310; 30; 20 MG/100ML; MG/100ML; MG/100ML; MG/100ML
50 INJECTION, SOLUTION INTRAVENOUS CONTINUOUS
Status: DISCONTINUED | OUTPATIENT
Start: 2022-11-25 | End: 2022-11-25

## 2022-11-25 RX ORDER — GLYCOPYRROLATE 0.2 MG/ML
INJECTION INTRAMUSCULAR; INTRAVENOUS AS NEEDED
Status: DISCONTINUED | OUTPATIENT
Start: 2022-11-25 | End: 2022-11-25

## 2022-11-25 RX ORDER — FENTANYL CITRATE 50 UG/ML
INJECTION, SOLUTION INTRAMUSCULAR; INTRAVENOUS AS NEEDED
Status: DISCONTINUED | OUTPATIENT
Start: 2022-11-25 | End: 2022-11-25

## 2022-11-25 RX ORDER — PROPOFOL 10 MG/ML
INJECTION, EMULSION INTRAVENOUS AS NEEDED
Status: DISCONTINUED | OUTPATIENT
Start: 2022-11-25 | End: 2022-11-25

## 2022-11-25 RX ORDER — LIDOCAINE HYDROCHLORIDE 10 MG/ML
INJECTION, SOLUTION EPIDURAL; INFILTRATION; INTRACAUDAL; PERINEURAL AS NEEDED
Status: DISCONTINUED | OUTPATIENT
Start: 2022-11-25 | End: 2022-11-25

## 2022-11-25 RX ORDER — PHYTONADIONE 5 MG/1
5 TABLET ORAL ONCE
Status: COMPLETED | OUTPATIENT
Start: 2022-11-25 | End: 2022-11-25

## 2022-11-25 RX ORDER — METOCLOPRAMIDE HYDROCHLORIDE 5 MG/ML
10 INJECTION INTRAMUSCULAR; INTRAVENOUS ONCE
Status: COMPLETED | OUTPATIENT
Start: 2022-11-25 | End: 2022-11-25

## 2022-11-25 RX ORDER — ONDANSETRON 2 MG/ML
4 INJECTION INTRAMUSCULAR; INTRAVENOUS ONCE AS NEEDED
Status: DISCONTINUED | OUTPATIENT
Start: 2022-11-25 | End: 2022-11-25 | Stop reason: HOSPADM

## 2022-11-25 RX ORDER — PANTOPRAZOLE SODIUM 40 MG/10ML
40 INJECTION, POWDER, LYOPHILIZED, FOR SOLUTION INTRAVENOUS EVERY 12 HOURS SCHEDULED
Status: DISCONTINUED | OUTPATIENT
Start: 2022-11-25 | End: 2022-12-03 | Stop reason: HOSPADM

## 2022-11-25 RX ADMIN — INSULIN LISPRO 4 UNITS: 100 INJECTION, SOLUTION INTRAVENOUS; SUBCUTANEOUS at 21:27

## 2022-11-25 RX ADMIN — INSULIN LISPRO 2 UNITS: 100 INJECTION, SOLUTION INTRAVENOUS; SUBCUTANEOUS at 08:31

## 2022-11-25 RX ADMIN — FORMOTEROL FUMARATE DIHYDRATE 20 MCG: 20 SOLUTION RESPIRATORY (INHALATION) at 19:46

## 2022-11-25 RX ADMIN — LEVALBUTEROL HYDROCHLORIDE 1.25 MG: 1.25 SOLUTION, CONCENTRATE RESPIRATORY (INHALATION) at 19:46

## 2022-11-25 RX ADMIN — GLYCOPYRROLATE 0.2 MG: 0.2 INJECTION, SOLUTION INTRAMUSCULAR; INTRAVENOUS at 16:04

## 2022-11-25 RX ADMIN — PROPOFOL 300 MG: 10 INJECTION, EMULSION INTRAVENOUS at 16:34

## 2022-11-25 RX ADMIN — LEVALBUTEROL HYDROCHLORIDE 1.25 MG: 1.25 SOLUTION, CONCENTRATE RESPIRATORY (INHALATION) at 13:28

## 2022-11-25 RX ADMIN — METOPROLOL TARTRATE 50 MG: 50 TABLET, FILM COATED ORAL at 08:33

## 2022-11-25 RX ADMIN — SODIUM CHLORIDE, SODIUM LACTATE, POTASSIUM CHLORIDE, AND CALCIUM CHLORIDE: .6; .31; .03; .02 INJECTION, SOLUTION INTRAVENOUS at 16:02

## 2022-11-25 RX ADMIN — SODIUM CHLORIDE 8 MG/HR: 900 INJECTION, SOLUTION INTRAVENOUS at 01:10

## 2022-11-25 RX ADMIN — ATORVASTATIN CALCIUM 10 MG: 10 TABLET, FILM COATED ORAL at 08:34

## 2022-11-25 RX ADMIN — PROPOFOL 200 MG: 10 INJECTION, EMULSION INTRAVENOUS at 16:10

## 2022-11-25 RX ADMIN — INSULIN LISPRO 4 UNITS: 100 INJECTION, SOLUTION INTRAVENOUS; SUBCUTANEOUS at 11:54

## 2022-11-25 RX ADMIN — IPRATROPIUM BROMIDE 0.5 MG: 0.5 SOLUTION RESPIRATORY (INHALATION) at 07:22

## 2022-11-25 RX ADMIN — IPRATROPIUM BROMIDE 0.5 MG: 0.5 SOLUTION RESPIRATORY (INHALATION) at 13:28

## 2022-11-25 RX ADMIN — METOPROLOL TARTRATE 50 MG: 50 TABLET, FILM COATED ORAL at 21:26

## 2022-11-25 RX ADMIN — FORMOTEROL FUMARATE DIHYDRATE 20 MCG: 20 SOLUTION RESPIRATORY (INHALATION) at 07:39

## 2022-11-25 RX ADMIN — INSULIN LISPRO 4 UNITS: 100 INJECTION, SOLUTION INTRAVENOUS; SUBCUTANEOUS at 17:39

## 2022-11-25 RX ADMIN — Medication 160 MG: at 16:10

## 2022-11-25 RX ADMIN — BUDESONIDE 0.5 MG: 0.5 INHALANT ORAL at 07:23

## 2022-11-25 RX ADMIN — FENTANYL CITRATE 50 MCG: 50 INJECTION, SOLUTION INTRAMUSCULAR; INTRAVENOUS at 16:10

## 2022-11-25 RX ADMIN — PREDNISONE 40 MG: 20 TABLET ORAL at 08:33

## 2022-11-25 RX ADMIN — BUDESONIDE 0.5 MG: 0.5 INHALANT ORAL at 19:46

## 2022-11-25 RX ADMIN — SODIUM CHLORIDE 8 MG/HR: 900 INJECTION, SOLUTION INTRAVENOUS at 11:53

## 2022-11-25 RX ADMIN — IPRATROPIUM BROMIDE 0.5 MG: 0.5 SOLUTION RESPIRATORY (INHALATION) at 19:46

## 2022-11-25 RX ADMIN — PHYTONADIONE 5 MG: 5 TABLET ORAL at 08:33

## 2022-11-25 RX ADMIN — LIDOCAINE HYDROCHLORIDE 50 MG: 10 INJECTION, SOLUTION EPIDURAL; INFILTRATION; INTRACAUDAL at 16:10

## 2022-11-25 RX ADMIN — LEVALBUTEROL HYDROCHLORIDE 1.25 MG: 1.25 SOLUTION, CONCENTRATE RESPIRATORY (INHALATION) at 07:22

## 2022-11-25 RX ADMIN — COLLAGENASE SANTYL 1 APPLICATION: 250 OINTMENT TOPICAL at 08:35

## 2022-11-25 RX ADMIN — GUAIFENESIN 1200 MG: 600 TABLET, EXTENDED RELEASE ORAL at 08:32

## 2022-11-25 RX ADMIN — Medication 6 MG: at 21:26

## 2022-11-25 RX ADMIN — METOCLOPRAMIDE 10 MG: 5 INJECTION, SOLUTION INTRAMUSCULAR; INTRAVENOUS at 08:34

## 2022-11-25 RX ADMIN — PANTOPRAZOLE SODIUM 40 MG: 40 INJECTION, POWDER, FOR SOLUTION INTRAVENOUS at 21:26

## 2022-11-25 NOTE — PROGRESS NOTES
[facility]  Progress Note - Gareth Munson 1960, 60 y o  female MRN: [id number]  Unit/Bed#: ICU 06 Encounter: [id number]  Primary Care Provider: Ketan Amador MD   Date and time admitted to hospital: 11/21/2022 12:58 PM    * Acute on chronic respiratory failure with hypoxia and hypercapnia (HCC)  Assessment & Plan  · In the setting of COPD/asthma with acute exacerbation and recent PNA   · At baseline, requires 4L NC, O2 requirement now at baseline  · S/p 5 day course abx for pneumonia    Plan  · Continue BIPAP HS and PRN for SASHA/OHS, encourage compliance  · Continue scheduled nebs and steroids for COPD exacerbation - consider transition to prednisone taper today   · Encourage cough, deep breathing, IS, ambulation with assistance     Acute metabolic encephalopathy  Assessment & Plan  · Likely in the setting of acute on chronic hypoxic and hypercapnic respiratory failure, now improving    Plan  · Continue BIPAP PRN and HS  · Regulate sleep/wake  · Delirium precautions  · Frequent neuro checks and reorientation     COPD with asthma ([facility] [address] )  Assessment & Plan  · With acute exacerbation  · Continue scheduled xopenex, atrovent, pulmicort, perforomist    Plan  · Will start oral prednisone taper    Pneumonia due to infectious organism  Assessment & Plan  · Diagnosed as OP  · S/p 5 days abx as OP and continued on admssion  · Patient remains afebrile, without leukocytosis, procal negative   · Monitor off abx     Panniculitis  Assessment & Plan  · 11/20 CT C/A/P: acute panniculitis, not appreciated on physical exam  · Monitor off abx      Bilateral pleural effusion  Assessment & Plan  · S/p diuresis with 40mg IV lasix daily    Plan  · Would continue PRN diuresis for goal -500ml over 24 hours     Leg wound, left, initial encounter  Assessment & Plan  · Continue local wound care  · Wound nurse following     Diabetes mellitus type 2 in [facility])  Assessment & Plan  Lab Results   Component Value Date    HGBA1C 7 0 (H) 11/01/2022       Recent Labs     11/24/22  0802 11/24/22  1124 11/24/22  1623 11/24/22  2116   POCGLU 175* 224* 255* 259*       Blood Sugar Average: Last 72 hrs:    · ACHS glucose checks and SSI for goal glucose 140-180    Morbid (severe) obesity due to excess calories (HCC)  Assessment & Plan  · Lifestyle modifications  · Nutrition consult    Anxiety  Assessment & Plan  · PRN atarax HS     Paroxysmal atrial fibrillation [facility])  Assessment & Plan  Recent Labs     11/23/22  0356 11/24/22  0550 11/25/22  0453   INR 2 88* 3 32* 2 83*       · Currently rate controlled  · Continue home metoprolol  · Patient with 3 episodes of melanotic stool on 11/24/22 with drop in Hgb required 2 units of prbc total  · No additional BMs yet and Hgb improving    Plan  · Continue to hold home coumadin with supratherapeutic INR  · Monitor for signs of bleeding with BMs  · Restart Coumadin when in therapeutic range vs reversal w/ FFP/PCC as needed if bleeding and drop in Hgb recurs      GERD (gastroesophageal reflux disease)  Assessment & Plan  · Continue home pepcid    Hypothyroid  Assessment & Plan  · Continue home levothyroxine     Dyslipidemia  Assessment & Plan  · Continue home statin     Chronic kidney disease, stage III (moderate) ([facility])  Assessment & Plan  Lab Results   Component Value Date    EGFR 35 11/24/2022    EGFR 31 11/23/2022    EGFR 33 11/22/2022    CREATININE 1 54 (H) 11/24/2022    CREATININE 1 69 (H) 11/23/2022    CREATININE 1 62 (H) 11/22/2022     · Baseline Cr: 1 8 - 2 4  · Currently at baseline  · Continue to monitor I/O and renal indices  · Continue PRN diuresis for goal negative 500ml/24 hours       ----------------------------------------------------------------------------------------  HPI/24hr events: Only wore BiPAP for 1 hour before removing  2-3 episodes of melanotic stools yesterday with drop in Hgb, requiring transfusion of 2 units prbcs    No bowel movements overnight and hgb has improved this morning  GI following with plans for EGD    Patient appropriate for transfer out of the ICU today?: No  Disposition: Continue Critical Care   Code Status: Level 1 - Full Code  ---------------------------------------------------------------------------------------  SUBJECTIVE  Patient examined at bedside  Patient currently undergoing breathing treatment, still reporting symptoms of shortness of breath  She was only able to wear BiPAP for 1 hour overnight before removing due to discomfort  Otherwise denies any other complaints at this time  Review of Systems   Constitutional: Negative for chills and fever  HENT: Negative for congestion  Eyes: Negative for pain  Respiratory: Positive for shortness of breath  Cardiovascular: Negative for chest pain  Gastrointestinal: Positive for blood in stool (yesterday, no BM today)  Negative for abdominal pain  Genitourinary: Negative for flank pain  Skin: Negative for pallor  Neurological: Negative for headaches  Psychiatric/Behavioral: Negative for confusion       Review of systems was reviewed and negative unless stated above in HPI/24-hour events   ---------------------------------------------------------------------------------------  OBJECTIVE    Vitals   Vitals:    22 0500 22 0600 22 0723 22 0740   BP: 114/65 118/74 102/51    BP Location:   Left arm    Pulse: 94 98 89    Resp: 19 19 18    Temp:   (!) 97 °F (36 1 °C)    TempSrc:   Axillary    SpO2: 94% 95% 95% 100%   Weight:       Height:         Temp (24hrs), Av 1 °F (36 7 °C), Min:97 °F (36 1 °C), Max:98 7 °F (37 1 °C)  Current: Temperature: (!) 97 °F (36 1 °C)  Arterial Line BP: 130/73  Arterial Line MAP (mmHg): 92 mmHg    Respiratory:  SpO2: SpO2: 100 %  Nasal Cannula O2 Flow Rate (L/min): 4 L/min    Invasive/non-invasive ventilation settings   Respiratory    Lab Data (Last 4 hours)    None         O2/Vent Data (Last 4 hours)    None                Physical Exam  Constitutional:       General: She is not in acute distress  Appearance: She is ill-appearing  HENT:      Head: Normocephalic and atraumatic  Mouth/Throat:      Mouth: Mucous membranes are moist    Eyes:      Pupils: Pupils are equal, round, and reactive to light  Cardiovascular:      Rate and Rhythm: Normal rate  Rhythm irregular  Pulses: Normal pulses  Heart sounds: Normal heart sounds  No murmur heard  No friction rub  No gallop  Pulmonary:      Effort: Pulmonary effort is normal  No respiratory distress  Breath sounds: No wheezing, rhonchi or rales  Comments: Limited d/t body habitus  Diminished    Abdominal:      General: Bowel sounds are normal  There is no distension  Palpations: Abdomen is soft  Tenderness: There is no abdominal tenderness  Musculoskeletal:         General: Swelling present  Normal range of motion  Cervical back: Neck supple  Right lower leg: Edema present  Left lower leg: Edema present  Skin:     General: Skin is warm and dry  Capillary Refill: Capillary refill takes less than 2 seconds  Neurological:      General: No focal deficit present  Mental Status: She is alert and oriented to person, place, and time  Cranial Nerves: No cranial nerve deficit  Sensory: No sensory deficit  Motor: No weakness               Laboratory and Diagnostics:  Results from last 7 days   Lab Units 11/25/22  0550 11/25/22  0041 11/24/22  1850 11/24/22  1301 11/24/22  0550 11/23/22  0356 11/22/22  0405 11/21/22  0507 11/20/22  1432   WBC Thousand/uL 13 09*  --   --   --  8 09 8 23 4 95 7 37 7 40   HEMOGLOBIN g/dL 8 0* 6 6* 7 4* 7 1* 7 7* 8 6* 8 0* 8 2* 9 0*   HEMATOCRIT % 26 9*  --   --   --  26 7* 30 8* 28 6* 30 7* 32 7*   PLATELETS Thousands/uL 223  --   --   --  246 236 239 242 269   NEUTROS PCT %  --   --   --   --   --  80*  --   --   --    BANDS PCT % 6  --   --   --   --   --   --   --  4   MONOS PCT %  --   -- --   --   --  8  --   --   --    MONO PCT % 7  --   --   --  3*  --   --   --  8     Results from last 7 days   Lab Units 11/25/22  0453 11/24/22  0550 11/23/22  0356 11/22/22  1147 11/22/22  0405 11/21/22  0507 11/20/22  1432   SODIUM mmol/L 141 141 139 140 140 140 139   POTASSIUM mmol/L 5 2 5 0 4 6 5 1 5 0 4 7 5 1   CHLORIDE mmol/L 98 96 97 99 101 101 101   CO2 mmol/L 39* 41* 37* 35* 33* 35* 37*   ANION GAP mmol/L 4 4 5 6 6 4 1*   BUN mg/dL 98* 64* 38* 31* 28* 21 20   CREATININE mg/dL 1 51* 1 54* 1 69* 1 62* 1 69* 1 63* 1 69*   CALCIUM mg/dL 9 3 9 4 9 2 9 0 8 6 8 9 9 1   GLUCOSE RANDOM mg/dL 188* 216* 161* 173* 143* 109 121   ALT U/L  --   --  9  --  7 6* 6*   AST U/L  --   --  15  --  11* 11* 11*   ALK PHOS U/L  --   --  118*  --  117* 131* 139*   ALBUMIN g/dL  --   --  3 1*  --  2 8* 3 0* 3 3*   TOTAL BILIRUBIN mg/dL  --   --  0 28  --  0 21 0 26 0 30     Results from last 7 days   Lab Units 11/25/22  0453 11/24/22  0550 11/23/22  0356 11/22/22  0405 11/21/22  0507   MAGNESIUM mg/dL 2 3 1 8* 2 0 1 7* 1 9   PHOSPHORUS mg/dL 2 8 2 5 2 7 3 4 3 8      Results from last 7 days   Lab Units 11/25/22  0453 11/24/22  0550 11/23/22  0356 11/22/22  0405 11/21/22  0507 11/20/22  1432   INR  2 83* 3 32* 2 88* 2 78* 2 93* 2 75*   PTT seconds  --   --   --   --   --  58*          Results from last 7 days   Lab Units 11/22/22  1147 11/20/22  1432   LACTIC ACID mmol/L 1 9 0 5     ABG:  Results from last 7 days   Lab Units 11/22/22  1001   PH ART  7 341*   PCO2 ART mm Hg 37 2   PO2 ART mm Hg 95 4   HCO3 ART mmol/L 19 7*   BASE EXC ART mmol/L -5 6   ABG SOURCE  Line, Arterial     VBG:  Results from last 7 days   Lab Units 11/23/22  0912 11/22/22  1148 11/22/22  1001   PH CISCO  7 336   < >  --    PCO2 CISCO mm Hg 80 1*   < >  --    PO2 CISCO mm Hg 68 3*   < >  --    HCO3 CISCO mmol/L 41 9*   < >  --    BASE EXC CISCO mmol/L 13 6   < >  --    ABG SOURCE   --   --  Line, Arterial    < > = values in this interval not displayed       Results from last 7 days   Lab Units 11/21/22  0507 11/20/22  1432   PROCALCITONIN ng/ml 0 16 0 18       Micro  Results from last 7 days   Lab Units 11/20/22  1528 11/20/22  1441 11/20/22  1432   BLOOD CULTURE   --  No Growth After 4 Days  No Growth After 4 Days  URINE CULTURE  No Growth <1000 cfu/mL  --   --    LEGIONELLA URINARY ANTIGEN  Negative  --   --    STREP PNEUMONIAE ANTIGEN, URINE  Negative  --   --        EKG:  Atrial fibrillation, rate 100-115  Imaging: I have personally reviewed pertinent reports  Intake and Output  I/O       11/23 0701 11/24 0700 11/24 0701 11/25 0700 11/25 0701 11/26 0700    P  O  480 660     I V  (mL/kg)  158 8 (1 1)     Blood  700     IV Piggyback  80     Total Intake(mL/kg) 480 (3 3) 1598 8 (11)     Urine (mL/kg/hr) 2168 (0 6) 1675 (0 5)     Stool 0 0     Total Output 2168 1675     Net -1688 -76 2            Unmeasured Urine Occurrence 3 x 2 x     Unmeasured Stool Occurrence 2 x 2 x           Height and Weights   Height: 5' 4" (162 6 cm)     Body mass index is 55 36 kg/m²  Weight (last 2 days)     Date/Time Weight    11/25/22 0218 146 (322 53)    11/24/22 0242 146 (321 21)    11/23/22 0333 161 (354 72)            Nutrition       Diet Orders   (From admission, onward)             Start     Ordered    11/25/22 0001  Diet NPO  Diet effective midnight        References:    Nutrtion Support Algorithm Enteral vs  Parenteral   Question Answer Comment   Diet Type NPO    RD to adjust diet per protocol?  Yes        11/24/22 1353                  Active Medications  Scheduled Meds:  Current Facility-Administered Medications   Medication Dose Route Frequency Provider Last Rate   • acetaminophen  650 mg Oral Q6H PRN TULIO Brooks     • atorvastatin  10 mg Oral Daily TULIO Brooks     • budesonide  0 5 mg Nebulization Q12H Jose Woodall MD     • collagenase   Topical Daily TULIO Brooks     • formoterol  20 mcg Nebulization Q12H Jose Woodall MD     • guaiFENesin  1,200 mg Oral BID TULIO Browne     • hydrOXYzine HCL  25 mg Oral HS PRN TULIO Payan     • insulin lispro  2-12 Units Subcutaneous TID AC TULIO Cordon     • insulin lispro  2-12 Units Subcutaneous HS TULIO Cordon     • ipratropium  0 5 mg Nebulization TID TULIO Browne     • levalbuterol  1 25 mg Nebulization TID TULIO Browne     • levothyroxine  100 mcg Oral Early Morning TULIO Browne     • melatonin  6 mg Oral HS Enio Mims     • metoprolol tartrate  50 mg Oral BID Francesca Mancia MD     • ondansetron  4 mg Intravenous Q8H PRN TULIO Choe     • pantoprozole (PROTONIX) infusion (Continuous)  8 mg/hr Intravenous Continuous TULIO Payan 8 mg/hr (11/25/22 0110)   • polyethylene glycol  17 g Oral Daily TULIO Cordon     • predniSONE  40 mg Oral Daily Berenice Davalos MD      Followed by   • [START ON 11/28/2022] predniSONE  30 mg Oral Daily Berenice Davalos MD      Followed by   • [START ON 12/1/2022] predniSONE  10 mg Oral Daily Berenice Davalos MD     • senna  1 tablet Oral BID TULIO Cordon       Continuous Infusions:  pantoprozole (PROTONIX) infusion (Continuous), 8 mg/hr, Last Rate: 8 mg/hr (11/25/22 0110)      PRN Meds:   acetaminophen, 650 mg, Q6H PRN  hydrOXYzine HCL, 25 mg, HS PRN  ondansetron, 4 mg, Q8H PRN        Invasive Devices Review  Invasive Devices     Peripheral Intravenous Line  Duration           Peripheral IV 11/24/22 Right;Ventral (anterior) Forearm 1 day    Peripheral IV 11/24/22 Distal;Left;Upper;Ventral (anterior) Arm <1 day          Drain  Duration           External Urinary Catheter 3 days                Rationale for remaining devices:   ---------------------------------------------------------------------------------------  Advance Directive and Living Will:      Power of :    POLST: ---------------------------------------------------------------------------------------      Pete Montoya MD      Portions of the record may have been created with voice recognition software  Occasional wrong word or "sound a like" substitutions may have occurred due to the inherent limitations of voice recognition software    Read the chart carefully and recognize, using context, where substitutions have occurred

## 2022-11-25 NOTE — H&P (VIEW-ONLY)
Consultation - [facility]  Lisa High 60 y o  female MRN: [id number]  Unit/Bed#: ICU 06 Encounter: [id number]        Inpatient consult to gastroenterology  Consult performed by: Jaiden Santos PA-C  Consult ordered by: Gaye Linda          Reason for Consult / Principal Problem:  Melena, anemia    ASSESSMENT and PLAN:      1  Acute on chronic anemia with melena  50-year-old female with history of AFib on Coumadin, COPD who presented a few days ago for weakness found to have COPD exacerbation treated with steroids  She subsequently developed melena yesterday with drop in hemoglobin from baseline around 8-9 to 6 6, status post 2 units of blood with repeat hemoglobin 8  Her BUN also increasing concurrently to 98 with creatinine of 1 5  Coumadin was held 2 days ago with INR today 2 8  She is on 3 L of oxygen currently which is her baseline  Vital signs stable  She had recent EGDs and colonoscopy 1 year ago for anemia with hemorrhagic erosions in the stomach without active bleeding, otherwise unremarkable  Possibly in the setting of peptic ulcer disease, RUBÉNMJemima's     -plan for EGD today  Discussed procedure with patient and girlfriend at bedside   -discussed with ICU team   Would recommend correction of INR with goal of less than 2   -recommend 1 dose of IV Reglan 10 mg   -continue Protonix drip    -monitor hemoglobin closely and transfuse as needed per primary team   -monitor stool output   -further recommendations after EGD    -recommend outpatient follow-up with GI for possible capsule endoscopy due to chronic anemia with unclear etiology      -------------------------------------------------------------------------------------------------------------------    HPI:     Lisa High is a 50-year-old female with history of AFib on Coumadin, COPD, GERD, type 2 diabetes presented to the emergency room 3 days ago for weakness and altered mental status with COPD exacerbation in the setting of recent pneumonia which was treated with nebulizer and steroids as well as course of antibiotics  GI was consulted due to few episodes of melena yesterday with drop in hemoglobin  Baseline hemoglobin around 8-9  Yesterday hemoglobin down trending to 7 1  She was given a unit of blood with repeat hemoglobin 6 6 this morning  Another unit of blood has been given with hemoglobin now 8  BUN also increasing currently 98 with a creatinine of 1 5 which is improved from arrival   INR elevated at 2 8  Her warfarin was discontinued yesterday with last dose 11/23  Vital signs currently stable with blood pressure of 102/51  She has on 4 L of oxygen which is her baseline  She did have some hypotensive episodes throughout admission  Patient was started on Protonix drip  Patient reports she has been feeling well from a GI standpoint  She denies any abdominal pain  She reports she has some nausea yesterday however no episodes of vomiting  At home she reports her appetite was normal   She denies any NSAIDs or recent steroid use  She reports having some reflux and takes Pepcid at bedtime  She reports some constipation over the last few days while in the hospital followed by her few episodes of melena yesterday  Discussed with nursing at bedside who reports no bowel movement so far today  Abdominal surgeries consistent nephrectomy due to kidney cancer, total hysterectomy, ectopic pregnancy, cholecystectomy  Patient with unknown family history  Patient reports quit smoking around 16 years ago  Patient had EGD 08/2021 for anemia without any signs of GI bleeding found to have small hiatal hernia with hemorrhagic erosions in the midbody without active bleeding seen as well as distal esophageal erythema  She had colonoscopy a few days later showing sigmoid diverticulosis however no blood noted    She then had a repeat EGD push that month which was advanced up to the proximal jejunum with no blood noted  REVIEW OF SYSTEMS:    CONSTITUTIONAL: Denies any fever, chills, or rigors  Good appetite, and no recent weight loss  HEENT: No earache or tinnitus  Denies hearing loss or visual disturbances  CARDIOVASCULAR: No chest pain or palpitations  RESPIRATORY: +cough, SOB, TERESA  GASTROINTESTINAL: As noted in the History of Present Illness  GENITOURINARY: No problems with urination  Denies any hematuria or dysuria  NEUROLOGIC: No dizziness or vertigo, denies headaches  MUSCULOSKELETAL: Denies any muscle or joint pain  SKIN: Denies skin rashes or itching  ENDOCRINE: Denies excessive thirst  Denies intolerance to heat or cold  PSYCHOSOCIAL: Denies depression or anxiety  Denies any recent memory loss  Historical Information   Past Medical History:   Diagnosis Date   • Anemia    • Arthritis    • Cancer of kidney ([facility] )    • Chronic kidney disease    • Diabetes mellitus ([facility] )    • Disease of thyroid gland    • HLD (hyperlipidemia)    • Hypertension    • Ovarian cancer ([facility] )    • Pneumonia      Past Surgical History:   Procedure Laterality Date   • CHOLECYSTECTOMY     • CT GUIDED PERC DRAINAGE CATHETER PLACEMENT  2016   • GALLBLADDER SURGERY  2004   • HYSTERECTOMY  2018   • NEPHRECTOMY Right 2018     Social History   Social History     Substance and Sexual Activity   Alcohol Use Never    Comment: n/a     Social History     Substance and Sexual Activity   Drug Use Yes   • Types: Marijuana    Comment: smokes with girlfriend when anxious     Social History     Tobacco Use   Smoking Status Former   • Packs/day: 3 00   • Years: 30 00   • Pack years: 90 00   • Types: Cigarettes   • Quit date: 10/22/2010   • Years since quittin 1   Smokeless Tobacco Former   • Quit date: 2011   Tobacco Comments    quit approx   9 years ago     Family History   Problem Relation Age of Onset   • No Known Problems Mother    • No Known Problems Father        Meds/Allergies Medications Prior to Admission   Medication   • albuterol (2 5 mg/3 mL) 0 083 % nebulizer solution   • albuterol (PROVENTIL HFA,VENTOLIN HFA) 90 mcg/act inhaler   • amLODIPine (NORVASC) 5 mg tablet   • atorvastatin (LIPITOR) 10 mg tablet   • Blood Glucose Monitoring Suppl ([employer] Pro Glucose Meter) SAVANNAH   • [] cefpodoxime (VANTIN) 200 mg tablet   • collagenase (SANTYL) ointment   • Embrace Lancets Ultra Thin 30G MISC   • famotidine (PEPCID) 20 mg tablet   • ferrous sulfate 325 (65 Fe) mg tablet   • levothyroxine 100 mcg tablet   • LORazepam (ATIVAN) 0 5 mg tablet   • metoprolol tartrate (LOPRESSOR) 50 mg tablet   • oxygen gas   • warfarin (COUMADIN) 3 mg tablet     Current Facility-Administered Medications   Medication Dose Route Frequency   • acetaminophen (TYLENOL) tablet 650 mg  650 mg Oral Q6H PRN   • atorvastatin (LIPITOR) tablet 10 mg  10 mg Oral Daily   • budesonide (PULMICORT) inhalation solution 0 5 mg  0 5 mg Nebulization Q12H   • collagenase (SANTYL) ointment   Topical Daily   • formoterol (PERFOROMIST) nebulizer solution 20 mcg  20 mcg Nebulization Q12H   • guaiFENesin (MUCINEX) 12 hr tablet 1,200 mg  1,200 mg Oral BID   • hydrOXYzine HCL (ATARAX) tablet 25 mg  25 mg Oral HS PRN   • insulin lispro (HumaLOG) 100 units/mL subcutaneous injection 2-12 Units  2-12 Units Subcutaneous TID AC   • insulin lispro (HumaLOG) 100 units/mL subcutaneous injection 2-12 Units  2-12 Units Subcutaneous HS   • ipratropium (ATROVENT) 0 02 % inhalation solution 0 5 mg  0 5 mg Nebulization TID   • levalbuterol (XOPENEX) inhalation solution 1 25 mg  1 25 mg Nebulization TID   • levothyroxine tablet 100 mcg  100 mcg Oral Early Morning   • melatonin tablet 6 mg  6 mg Oral HS   • metoprolol tartrate (LOPRESSOR) tablet 50 mg  50 mg Oral BID   • ondansetron (ZOFRAN) injection 4 mg  4 mg Intravenous Q8H PRN   • pantoprazole (PROTONIX) 80 mg in sodium chloride 0 9 % 100 mL infusion  8 mg/hr Intravenous Continuous   • polyethylene glycol (MIRALAX) packet 17 g  17 g Oral Daily   • predniSONE tablet 40 mg  40 mg Oral Daily    Followed by   • [START ON 11/28/2022] predniSONE tablet 30 mg  30 mg Oral Daily    Followed by   • [START ON 12/1/2022] predniSONE tablet 10 mg  10 mg Oral Daily   • senna (SENOKOT) tablet 8 6 mg  1 tablet Oral BID       No Known Allergies        Objective     Blood pressure 102/51, pulse 89, temperature (!) 97 °F (36 1 °C), temperature source Axillary, resp  rate 18, height 5' 4" (1 626 m), weight (!) 146 kg (322 lb 8 5 oz), SpO2 100 %, not currently breastfeeding  Intake/Output Summary (Last 24 hours) at 11/25/2022 0753  Last data filed at 11/25/2022 0600  Gross per 24 hour   Intake 1598 83 ml   Output 1675 ml   Net -76 17 ml         PHYSICAL EXAM:      General Appearance:   Alert, cooperative, no distress  Coughing during exam    HEENT:   Normocephalic, atraumatic, anicteric  Neck:  Supple, symmetrical, trachea midline, no adenopathy  Lungs:   Decreased breath sounds, On 3 L O2  Coughing during exAM   Heart[de-identified]   Regular rate, irregular rhythm   Abdomen:   Soft, non-tender, non-distended; normal bowel sounds; no masses, no organomegaly    Genitalia:   Deferred    Rectal:   Deferred    Extremities:  + bilateral pitting edema    Pulses:  2+ and symmetric all extremities    Skin:  Skin color, texture, turgor normal, no rashes or lesions    Lymph nodes:  No palpable cervical, axillary or inguinal lymphadenopathy        Lab Results:   Results from last 7 days   Lab Units 11/25/22  0550 11/24/22  0550 11/23/22  0356   WBC Thousand/uL 13 09*   < > 8 23   HEMOGLOBIN g/dL 8 0*   < > 8 6*   HEMATOCRIT % 26 9*   < > 30 8*   PLATELETS Thousands/uL 223   < > 236   NEUTROS PCT %  --   --  80*   LYMPHS PCT %  --   --  9*   LYMPHO PCT % 17   < >  --    MONOS PCT %  --   --  8   MONO PCT % 7   < >  --    EOS PCT % 2   < > 0    < > = values in this interval not displayed       Results from last 7 days   Lab Units 11/25/22  0453 11/24/22  0550 11/23/22  0356   POTASSIUM mmol/L 5 2   < > 4 6   CHLORIDE mmol/L 98   < > 97   CO2 mmol/L 39*   < > 37*   BUN mg/dL 98*   < > 38*   CREATININE mg/dL 1 51*   < > 1 69*   CALCIUM mg/dL 9 3   < > 9 2   ALK PHOS U/L  --   --  118*   ALT U/L  --   --  9   AST U/L  --   --  15    < > = values in this interval not displayed  Results from last 7 days   Lab Units 11/25/22  0453   INR  2 83*           Imaging Studies: I have personally reviewed pertinent imaging studies  No results found  Patient was seen and examined by Dr Venessa Granados  All buchanan medical decisions were made by Dr Venessa Granados  Thank you for allowing us to participate in the care of this present patient  We will follow-up with you closely

## 2022-11-25 NOTE — CONSULTS
Consultation - Neuropsychology/Psychology Department  Nico Soto 60 y o  female MRN: [id number]  Unit/Bed#: ICU 06 Encounter: [id number]        Reason for Consultation:  Nico Soto is a 60y o  year old female who was referred for a Neuropsychological Exam to assess cognitive functioning and comment on capacity to make informed medical decisions        History of Present Illness  Acute on chronic respiratory failure    Physician Requesting Consult: Jenene Cabot, DO    PROBLEM LIST:  Patient Active Problem List   Diagnosis   • [facility] to stage 3b [facility]  )   • Chronic kidney disease, stage III (moderate) ([place] [address] )   • Hypertensive chronic kidney disease with stage 1 through stage 4 chronic kidney disease, or unspecified chronic kidney disease   • Persistent proteinuria   • Iron deficiency   • Dyslipidemia   • Hyperparathyroidism ([place] [address] )   • SOB (shortness of breath)   • Cellulitis   • H/O right nephrectomy   • Severe sepsis ([facility])   • Acute renal failure superimposed on chronic kidney disease ([facility])   • Paroxysmal atrial fibrillation ([facility])   • COPD with asthma ([place] [address] )   • Hypothyroid   • Chronic constipation   • GERD (gastroesophageal reflux disease)   • Anxiety   • Secondary hyperparathyroidism of renal origin ([place] [address] )   • Morbid (severe) obesity due to excess calories ([facility])   • Supratherapeutic INR   • Depression, recurrent ([place] [address] )   • Diabetes mellitus type 2 in obese ([facility]  )   • Pre-ulcerative corn or callous   • Diabetic nephropathy associated with type 2 diabetes mellitus ([facility] )   • Tinea   • Diabetic polyneuropathy associated with type 2 diabetes mellitus ([facility] )   • Leg wound, left, initial encounter   • Diabetic neuropathy ([place] [address] )   • Pneumonia due to infectious organism   • Cardiomegaly   • Abnormal CT scan   • Panniculus   • Bilateral pleural effusion   • Acute metabolic encephalopathy   • Panniculitis   • Acute on chronic respiratory failure with hypoxia and hypercapnia ([place] [address] ) Historical Information   Past Medical History:   Diagnosis Date   • Anemia    • Arthritis    • Cancer of kidney ([facility] )    • Chronic kidney disease    • Diabetes mellitus ([facility] )    • Disease of thyroid gland    • HLD (hyperlipidemia)    • Hypertension    • Ovarian cancer ([facility] )    • Pneumonia      Past Surgical History:   Procedure Laterality Date   • CHOLECYSTECTOMY     • CT GUIDED PERC DRAINAGE CATHETER PLACEMENT  2016   • GALLBLADDER SURGERY  2004   • HYSTERECTOMY  2018   • NEPHRECTOMY Right 2018     Social History   Social History     Substance and Sexual Activity   Alcohol Use Never    Comment: n/a     Social History     Substance and Sexual Activity   Drug Use Yes   • Types: Marijuana    Comment: smokes with girlfriend when anxious     Social History     Tobacco Use   Smoking Status Former   • Packs/day: 3 00   • Years: 30 00   • Pack years: 90 00   • Types: Cigarettes   • Quit date: 10/22/2010   • Years since quittin 1   Smokeless Tobacco Former   • Quit date: 2011   Tobacco Comments    quit approx   9 years ago     Family History:   Family History   Problem Relation Age of Onset   • No Known Problems Mother    • No Known Problems Father        Meds/Allergies   current meds:   Current Facility-Administered Medications   Medication Dose Route Frequency   • acetaminophen (TYLENOL) tablet 650 mg  650 mg Oral Q6H PRN   • atorvastatin (LIPITOR) tablet 10 mg  10 mg Oral Daily   • budesonide (PULMICORT) inhalation solution 0 5 mg  0 5 mg Nebulization Q12H   • collagenase (SANTYL) ointment   Topical Daily   • formoterol (PERFOROMIST) nebulizer solution 20 mcg  20 mcg Nebulization Q12H   • guaiFENesin (MUCINEX) 12 hr tablet 1,200 mg  1,200 mg Oral BID   • hydrOXYzine HCL (ATARAX) tablet 25 mg  25 mg Oral HS PRN   • insulin lispro (HumaLOG) 100 units/mL subcutaneous injection 2-12 Units  2-12 Units Subcutaneous TID AC   • insulin lispro (HumaLOG) 100 units/mL subcutaneous injection 2-12 Units  2-12 Units Subcutaneous HS   • ipratropium (ATROVENT) 0 02 % inhalation solution 0 5 mg  0 5 mg Nebulization TID   • levalbuterol (XOPENEX) inhalation solution 1 25 mg  1 25 mg Nebulization TID   • levothyroxine tablet 100 mcg  100 mcg Oral Early Morning   • melatonin tablet 6 mg  6 mg Oral HS   • metoprolol tartrate (LOPRESSOR) tablet 50 mg  50 mg Oral BID   • ondansetron (ZOFRAN) injection 4 mg  4 mg Intravenous Q8H PRN   • pantoprazole (PROTONIX) 80 mg in sodium chloride 0 9 % 100 mL infusion  8 mg/hr Intravenous Continuous   • polyethylene glycol (MIRALAX) packet 17 g  17 g Oral Daily   • predniSONE tablet 40 mg  40 mg Oral Daily    Followed by   • [START ON 11/28/2022] predniSONE tablet 30 mg  30 mg Oral Daily    Followed by   • [START ON 12/1/2022] predniSONE tablet 10 mg  10 mg Oral Daily   • senna (SENOKOT) tablet 8 6 mg  1 tablet Oral BID       No Known Allergies      Family and Social Support:   No data recorded    Behavioral Observations: Alert, oriented x 3, cooperative; affect appeared appropriate to content and patient denied depressed mood and anxiety; no overt evidence of psychotic process; patient appeared aware of reason for hospitalization and medical history    Cognitive Examination    General Cognitive Functioning MMSE = Average 27/28; Attention/Concentration Auditory Selective Attention = Average; Auditory Vigilance = Within Normal Limits; Information Processing Speed = Within Normal Limits    Frontal Systems/Executive Functioning Mental Flexibility/Cognitive Control = Average; Working Memory = Average Abstract Reasoning = Average; Generative Ability = Average,  Commonsense Reasoning and Judgement = Low Average    Language Functioning Confrontation naming = Within Normal Limits, Phonemic Fluency = Average; Semantic Retrieval = Average;  Comprehension of Complex Ideational Material = Average;  Praxis = Within Normal Limits; Repetition = Within Normal Limits; Basic Reading = Within Normal Limits; Following Commands = Within Normal Limits    Memory Functioning Narrative Recall - Short Delay = Average; Long Delay Narrative Recall = Borderline; Three word recall = Average    Visuo-Spatial Abilities Not Assessed    Functional Knowledge  Health & Safety Knowledge = Within Normal Limits;     Summary/Impression:  Results of Neuropsychological Exam revealed adequate cognitive functioning and on a measure assessing awareness of personal health status and ability to evaluate health problems, handle medical emergencies and take safety precautions, patient performed within normal limits  During this encounter, patient appears to have capacity to make informed medical decisions

## 2022-11-25 NOTE — ANESTHESIA POSTPROCEDURE EVALUATION
Post-Op Assessment Note    CV Status:  Stable    Pain management: adequate     Mental Status:  Lethargic and awake   Hydration Status:  Stable   PONV Controlled:  None   Airway Patency:  Patent      Post Op Vitals Reviewed: Yes      Staff: CRNA         No notable events documented      BP   123/57   Temp  99 3   Pulse 130   Resp   24   SpO2   96

## 2022-11-25 NOTE — CONSULTS
Consultation - Palliative and Supportive Care   Mary Jasso 60 y o  female [id number]    Assessment:  Acute on chronic respiratory failure  Acute metabolic encephalopathy  Pneumonia  Bilateral pleural effusion  COPD  Asthma  CKDIII  Morbid obesity  Anxiety  Goals of care counseling    Goals:  Level 1 code status  Goals of care and code status confirmed today  · Disease focused care with no limits placed  · Patient and Annabelle Blanco adamantly refuse STR  Want her discharged home when medically stable  · Will continue discussions regarding [address] as patient's clinical presentation evolves  Plan:  Symptom management:  · Will defer symptom management to the ICU team at this time  Patient takes 0 5mg of ativan every night @ hs  Discussed restarting this medication with ICU Team which they will consider  Patient at risk for delirium  · Continue recommend global delirium precautions including:  · Establishment of day/night cycle via lights during the day and blinds open  Please limit interruptions at night as medically appropriate  · Provide glasses/hearing aids as apprioriate  · Minimize deliriogenic medications as able  · Provide reorientation including date on board and visible clock  · Avoid restraints as able, frequent verbal reorientations or patient care sitter as appropriate  Social support:  • Patient is finding comfort being well supported by [employer]  They have been together for 16 years  · Supportive listening provided  "Terrified of wearing bipap"  · Normalized experience of patient/family  · Provided anxiety containment              I have reviewed the patient's controlled substance dispensing history in the Prescription Drug Monitoring Program in compliance with the [facility] regulations before prescribing any controlled substances    Last refills   10/31/2022  07/27/2022   1  Lorazepam 0 5 Mg Tablet 30 00  30  [facility]  [id number]   Xavier ([id number])   0 50 LME  [place]  [place]     10/26/2022  10/26/2022 1  Pregabalin 50 Mg Capsule 60 00  30  Ra Vicente              Decisional apparatus:  Patient appears competent on exam today however neuropsych eval is pending  If competency is lost, patient's substitute decision maker would default to 2 sisters by [place] Act 169 however patient has completed POA paperwork and advanced directives that name significant other Rayray Lorenz as her healthcare agent  Shivani Hunt was able to provide advanced directives which were copied and placed in the scanning to be entered into patient's chart  Advance Directive/Living Will: Reviewed  Request full care with no limits  POLST: Reviewed  Request full care  POA Forms: Review  Name Rayray Lorenz as HCA and only decision maker  S/O Rayray Lorenz [phone]    We appreciate the invitation to be involved in this patient's care  We will continue to follow throughout this hospitalization  Please do not hesitate to reach our on call provider through our clinic answering service at  should you have acute symptom control concerns  Bhavya Moralez, MSN, CRNP, [facility]  Palliative and Supportive Care  Clinic/Answering Service: [phone]  You can find me on [employer]! IDENTIFICATION:  Inpatient consult to Palliative Care  Consult performed by: Fabiano Larsen 27 ordered by: Jose Collado MD      Physician Requesting Consult: Jarred Hurst DO  Reason for Consult / Principal Problem: GOC counseling and SM secondary to COPD and acute on chronic respiratory failure    History of Present Illness:  Flakito Boyd is a 60 y o  female who presents with a palliative diagnosis of COPD  She was brought into the ED at [facility] on 11/20/22 secondary to hypercapnic respiratory failure, lethargy and confusion  Patient was transferred to [facility] for closer monitoring and possible need for intubation  This is her third admission this month    Palliative Medicine has been consulted to assist with goals of care counseling during this admission  Patient is seen lying in bed comfortably with partner shaun present  Patient's only complaint today is that she wants to be allowed to be allowed to have water however she is NPO  We discussed the fact that she has been refusing BiPAP  Introduced hospice however patient reports she does not wear the BiPAP because she is terrified not because she is ready to pass away  Says she feels that the BiPAP is very uncomfortable and additionally she is claustrophobic and tends to panic when the mask is put on  Mariya Ziegler encourages her to wear and they seem to understand that without it she will pass away  Discussed code status  Currently a level 1  Reviewed advanced directives and POLST both of which request full care with no limits  Patient confirms that this is still what she wants  Neuropsychology has been consulted for competency so will discuss code status and GOC more in-depth once consult is completed  Met with Shaun alone  She reports she has been trying to encourage patient to do the recommended things however she tends to be noncompliant  She is very tearful, says she knew hospice was going to come up  Mariya Ziegler states that patient has repeatedly told her she is not ready to die and that she is not giving up but Mariya Ziegler feels in her heart that she may be  Patient lives at home with Walt 65  They have been together for 16 years  Both have very little family  Patient does have 2 sisters  Sister Krista Zarco lives in [place] but about 2 hours away and her and patient communicate intermittently  Her other sister OSVALDO was in [place] and they have not spoken for 20 years  Rehab has been recommended however she refused and wishes to be discharged home when stable  They would want visiting nurses and shaun will remain as primary caretaker with their assistance      Review of Systems   All other systems reviewed and are negative  Past Medical History:   Diagnosis Date   • Anemia    • Arthritis    • Cancer of kidney ([facility] [address] )    • Chronic kidney disease    • [facility] mellitus [facility] )    • Disease of thyroid gland    • HLD (hyperlipidemia)    • Hypertension    • Ovarian cancer ([facility] )    • Pneumonia      Past Surgical History:   Procedure Laterality Date   • CHOLECYSTECTOMY     • CT GUIDED PERC DRAINAGE CATHETER PLACEMENT  2016   • GALLBLADDER SURGERY  2004   • HYSTERECTOMY  2018   • NEPHRECTOMY Right 2018     Social History     Socioeconomic History   • Marital status: Single     Spouse name: Not on file   • Number of children: 0   • Years of education: Not on file   • Highest education level: 12th grade   Occupational History   • Not on file   Tobacco Use   • Smoking status: Former     Packs/day: 3 00     Years: 30 00     Pack years: 90 00     Types: Cigarettes     Quit date: 10/22/2010     Years since quittin    • Smokeless tobacco: Former     Quit date: 2011   • Tobacco comments:     quit approx   9 years ago   Vaping Use   • Vaping Use: Never used   Substance and Sexual Activity   • Alcohol use: Never     Comment: n/a   • Drug use: Yes     Types: Marijuana     Comment: smokes with girlfriend when anxious   • Sexual activity: Yes     Partners: Female   Other Topics Concern   • Not on file   Social History Narrative    · Most recent tobacco use screenin2020      · Do you currently or have you served in the [employer]:   No      · Were you activated, into active duty, as a member of the [employer] or as a Reservist:   No      ·     Last modified by [email or web address]   2020, 10:39      Social Determinants of Health     Financial Resource Strain: Not on file   Food Insecurity: No Food Insecurity   • Worried About [address] in the Last Year: Never true   • Ran Out of Food in the Last Year: Never true   Transportation Needs: No Transportation Needs   • Lack of Transportation (Medical): No   • Lack of Transportation (Non-Medical): No   Physical Activity: Not on file   Stress: Not on file   Social Connections: Not on file   Intimate Partner Violence: Not on file   Housing Stability: Low Risk    • Unable to Pay for Housing in the Last Year: No   • Number of Places Lived in the Last Year: 1   • Unstable Housing in the Last Year: No     Family History   Problem Relation Age of Onset   • No Known Problems Mother    • No Known Problems Father      Medications:  all current active meds have been reviewed    No Known Allergies    Objective:  /50   Pulse 95   Temp 98 5 °F (36 9 °C) (Oral)   Resp 17   Ht 5' 4" (1 626 m)   Wt (!) 146 kg (322 lb 8 5 oz)   SpO2 (!) 88%   BMI 55 36 kg/m²   Physical Exam:  Constitutional: Obese  Appears chronically ill  In no acute distress  Head: Normocephalic and atraumatic  Eyes: EOM are normal  No ocular discharge  No scleral icterus  Neck: no visible adenopathy or masses  Cardiac: Normal rate, palpable pulses  Respiratory: Effort normal  No stridor  No respiratory distress  No cough, on NC O2 @ 3 liters  Gastrointestinal: No abdominal distension  Musculoskeletal: BLE edema  Edema in left hand  Neurological: Alert, oriented and appropriately conversant  Skin: Dry, no diaphoresis  Psychiatric: Flat affect  Behavior, judgement and thought content appear normal      Lab Results:   I have personally reviewed pertinent labs  , CBC:   Lab Results   Component Value Date    WBC 13 09 (H) 11/25/2022    HGB 8 0 (L) 11/25/2022    HCT 26 9 (L) 11/25/2022     (H) 11/25/2022     11/25/2022    MCH 29 9 11/25/2022    MCHC 29 7 (L) 11/25/2022    RDW 20 0 (H) 11/25/2022    MPV 10 8 11/25/2022   , CMP:   Lab Results   Component Value Date    SODIUM 141 11/25/2022    K 5 2 11/25/2022    CL 98 11/25/2022    CO2 39 (H) 11/25/2022    BUN 98 (H) 11/25/2022    CREATININE 1 51 (H) 11/25/2022    CALCIUM 9 3 11/25/2022    EGFR 36 11/25/2022   Imaging Studies: I have personally reviewed pertinent reports  EKG, Pathology, and Other Studies: I have personally reviewed pertinent reports  Counseling / Coordination of Care  Total floor / unit time spent today 95 minutes  Greater than 50% of total time was spent with the patient and / or family counseling and / or coordination of care  A description of the counseling / coordination of care: Reviewed chart, provided medical updates, determined goals of care, discussed palliative care and symptom management, discussed comfort care and hospice care, discussed code status, determined competency and POA/HCA, determined social/family support, provided psychosocial support  Reviewed with ICU team Dr Clifford Bean and CM  Portions of this document may have been created using dictation software and as such some "sound alike" terms may have been generated by the system  Do not hesitate to contact me with any questions or clarifications

## 2022-11-25 NOTE — ASSESSMENT & PLAN NOTE
· S/p diuresis with 40mg IV lasix daily    Plan  · Would continue PRN diuresis for goal -500ml over 24 hours

## 2022-11-25 NOTE — PLAN OF CARE
Problem: Potential for Falls  Goal: Patient will remain free of falls  Description: INTERVENTIONS:  - Educate patient/family on patient safety including physical limitations  - Instruct patient to call for assistance with activity   - Consult OT/PT to assist with strengthening/mobility   - Keep Call bell within reach  - Keep bed low and locked with side rails adjusted as appropriate  - Keep care items and personal belongings within reach  - Initiate and maintain comfort rounds  - Make Fall Risk Sign visible to staff  - Offer Toileting every 2 Hours, in advance of need  - Initiate/Maintain bed alarm  - Obtain necessary fall risk management equipment  - Apply yellow socks and bracelet for high fall risk patients  - Consider moving patient to room near nurses station  Outcome: Progressing     Problem: MOBILITY - ADULT  Goal: Maintain or return to baseline ADL function  Description: INTERVENTIONS:  -  Assess patient's ability to carry out ADLs; assess patient's baseline for ADL function and identify physical deficits which impact ability to perform ADLs (bathing, care of mouth/teeth, toileting, grooming, dressing, etc )  - Assess/evaluate cause of self-care deficits   - Assess range of motion  - Assess patient's mobility; develop plan if impaired  - Assess patient's need for assistive devices and provide as appropriate  - Encourage maximum independence but intervene and supervise when necessary  - Involve family in performance of ADLs  - Assess for home care needs following discharge   - Consider OT consult to assist with ADL evaluation and planning for discharge  - Provide patient education as appropriate  Outcome: Progressing  Goal: Maintains/Returns to pre admission functional level  Description: INTERVENTIONS:  - Perform BMAT or MOVE assessment daily    - Set and communicate daily mobility goal to care team and patient/family/caregiver     - Collaborate with rehabilitation services on mobility goals if consulted  - Perform Range of Motion 3 times a day  - Reposition patient every 2 hours  - Dangle patient 3 times a day  - Stand patient 3 times a day  - Ambulate patient 3 times a day  - Out of bed to chair 3 times a day   - Out of bed for meals 3 times a day  - Out of bed for toileting  - Record patient progress and toleration of activity level   Outcome: Progressing     Problem: Prexisting or High Potential for Compromised Skin Integrity  Goal: Skin integrity is maintained or improved  Description: INTERVENTIONS:  - Identify patients at risk for skin breakdown  - Assess and monitor skin integrity  - Assess and monitor nutrition and hydration status  - Monitor labs   - Assess for incontinence   - Turn and reposition patient  - Assist with mobility/ambulation  - Relieve pressure over bony prominences  - Avoid friction and shearing  - Provide appropriate hygiene as needed including keeping skin clean and dry  - Evaluate need for skin moisturizer/barrier cream  - Collaborate with interdisciplinary team   - Patient/family teaching  - Consider wound care consult   Outcome: Progressing     Problem: Nutrition/Hydration-ADULT  Goal: Nutrient/Hydration intake appropriate for improving, restoring or maintaining nutritional needs  Description: Monitor and assess patient's nutrition/hydration status for malnutrition  Collaborate with interdisciplinary team and initiate plan and interventions as ordered  Monitor patient's weight and dietary intake as ordered or per policy  Utilize nutrition screening tool and intervene as necessary  Determine patient's food preferences and provide high-protein, high-caloric foods as appropriate       INTERVENTIONS:  - Monitor oral intake, urinary output, labs, and treatment plans  - Assess nutrition and hydration status and recommend course of action  - Evaluate amount of meals eaten  - Assist patient with eating if necessary   - Allow adequate time for meals  - Recommend/ encourage appropriate diets, oral nutritional supplements, and vitamin/mineral supplements  - Order, calculate, and assess calorie counts as needed  - Recommend, monitor, and adjust tube feedings and TPN/PPN based on assessed needs  - Assess need for intravenous fluids  - Provide specific nutrition/hydration education as appropriate  - Include patient/family/caregiver in decisions related to nutrition  Outcome: Progressing

## 2022-11-25 NOTE — CASE MANAGEMENT
Case Management Progress Note    Patient name Flakito Boyd  Location ICU 06/ICU 06 MRN [id number]  : 1960 Date 2022       LOS (days): 4  Geometric Mean LOS (GMLOS) (days): 3 50  Days to GMLOS:-0 5        OBJECTIVE:        Current admission status: Inpatient  Preferred Pharmacy:   [address] [place] [address] [id number], [address] [place] [address],[address]  [address] [address]  Phone: [phone] Fax: [phone]    Primary Care Provider: Anthoney Seip, MD    Primary Insurance: MEDICARE  Secondary Insurance: [address] [place] [address]    PROGRESS NOTE:    Updated received in rounds patient is not yet stable for DC  Plan to have EGD today  Patient will need bipap at DC and qualifying tests will be completed for CM to order closer to DC  Confirmed with care team that patient currently declines STR  Per attending, plan for neuropsych eval then family meeting with palliative TBD  Confirmed with [facility] they can accept at DC, updated [facility] via [facility] of new bipap need and unknown expected DC date

## 2022-11-25 NOTE — ANESTHESIA PREPROCEDURE EVALUATION
Procedure:  EGD WITH PUSH ENTEROSCOPY    Relevant Problems   ANESTHESIA (within normal limits)      CARDIO   (+) Paroxysmal atrial fibrillation (HCC)      ENDO   (+) Diabetes mellitus type 2 in obese (HCC)   (+) Hyperparathyroidism (HCC)   (+) Hypothyroid   (+) Secondary hyperparathyroidism of renal origin (HCC)      GI/HEPATIC   (+) GERD (gastroesophageal reflux disease)      /RENAL   (+) Acute renal failure superimposed on chronic kidney disease (HCC)   (+) Anemia due to stage 3b chronic kidney disease (HCC)   (+) Chronic kidney disease, stage III (moderate) (HCC)   (+) Diabetic nephropathy associated with type 2 diabetes mellitus (HCC)   (+) Hypertensive chronic kidney disease with stage 1 through stage 4 chronic kidney disease, or unspecified chronic kidney disease      GYN   (+) History of hysterectomy      HEMATOLOGY   (+) Anemia due to stage 3b chronic kidney disease (HCC)      MUSCULOSKELETAL (within normal limits)      NEURO/PSYCH   (+) Anxiety   (+) Depression, recurrent (HCC)   (+) Diabetic neuropathy (HCC)   (+) Diabetic polyneuropathy associated with type 2 diabetes mellitus (HCC)      PULMONARY   (+) Acute on chronic respiratory failure with hypoxia and hypercapnia (HCC)   (+) Bilateral pleural effusion   (+) COPD with asthma (HCC)   (+) Pneumonia due to infectious organism   (+) SOB (shortness of breath)        TTE 11/16/2022  Interpretation Summary       •  Study Details: Study quality was poor  This was a technically difficult study due to decreased penetration and poor acoustic windows  •  Left Ventricle: Left ventricular cavity size is normal  Wall thickness is mildly increased  There is concentric remodeling  Wall motion and left ventricular ejection fraction cannot be accurately assessed  Left ventricular systolic function appears normal in limited views  •  Right Ventricle: Right ventricular cavity size is dilated  Systolic function is normal  Wall thickness is increased    •  Left Atrium: The atrium is dilated  •  Right Atrium: The atrium is dilated  •  Tricuspid Valve: There is mild regurgitation  The estimated right ventricular systolic pressure at least moderately elevated  •  Pericardium: There is no pericardial effusion        Physical Exam    Airway    Mallampati score: IV  TM Distance: <3 FB  Neck ROM: limited     Dental       Cardiovascular      Pulmonary      Other Findings        Anesthesia Plan  ASA Score- 4 Emergent    Anesthesia Type- general with ASA Monitors  Additional Monitors:   Airway Plan: ETT  Plan Factors-Exercise tolerance (METS): <4 METS  Chart reviewed  Existing labs reviewed  Patient summary reviewed  Patient is a current smoker  Induction- intravenous  Postoperative Plan- Plan for postoperative opioid use  Informed Consent- Anesthetic plan and risks discussed with patient  I personally reviewed this patient with the CRNA  Discussed and agreed on the Anesthesia Plan with the CRNA  Juan Carlos Balderas

## 2022-11-25 NOTE — ASSESSMENT & PLAN NOTE
· With acute exacerbation  · Continue scheduled xopenex, atrovent, pulmicort, perforomist    Plan  · Will start oral prednisone taper

## 2022-11-25 NOTE — CONSULTS
Consultation - [facility]) [facility]  Harley Mario 60 y o  female MRN: [id number]  Unit/Bed#: ICU 06 Encounter: [id number]        Inpatient consult to gastroenterology  Consult performed by: Andrea Mercado PA-C  Consult ordered by: Gaye Griffin          Reason for Consult / Principal Problem:  Melena, anemia    ASSESSMENT and PLAN:      1  Acute on chronic anemia with melena  50-year-old female with history of AFib on Coumadin, COPD who presented a few days ago for weakness found to have COPD exacerbation treated with steroids  She subsequently developed melena yesterday with drop in hemoglobin from baseline around 8-9 to 6 6, status post 2 units of blood with repeat hemoglobin 8  Her BUN also increasing concurrently to 98 with creatinine of 1 5  Coumadin was held 2 days ago with INR today 2 8  She is on 3 L of oxygen currently which is her baseline  Vital signs stable  She had recent EGDs and colonoscopy 1 year ago for anemia with hemorrhagic erosions in the stomach without active bleeding, otherwise unremarkable  Possibly in the setting of peptic ulcer disease, AVM, Dieulafoy's     -plan for EGD today  Discussed procedure with patient and girlfriend at bedside   -discussed with ICU team   Would recommend correction of INR with goal of less than 2   -recommend 1 dose of IV Reglan 10 mg   -continue Protonix drip    -monitor hemoglobin closely and transfuse as needed per primary team   -monitor stool output   -further recommendations after EGD    -recommend outpatient follow-up with GI for possible capsule endoscopy due to chronic anemia with unclear etiology      -------------------------------------------------------------------------------------------------------------------    HPI:     Harley Mario is a 50-year-old female with history of AFib on Coumadin, COPD, GERD, type 2 diabetes presented to the emergency room 3 days ago for weakness and altered mental status with COPD exacerbation in the setting of recent pneumonia which was treated with nebulizer and steroids as well as course of antibiotics  GI was consulted due to few episodes of melena yesterday with drop in hemoglobin  Baseline hemoglobin around 8-9  Yesterday hemoglobin down trending to 7 1  She was given a unit of blood with repeat hemoglobin 6 6 this morning  Another unit of blood has been given with hemoglobin now 8  BUN also increasing currently 98 with a creatinine of 1 5 which is improved from arrival   INR elevated at 2 8  Her warfarin was discontinued yesterday with last dose 11/23  Vital signs currently stable with blood pressure of 102/51  She has on 4 L of oxygen which is her baseline  She did have some hypotensive episodes throughout admission  Patient was started on Protonix drip  Patient reports she has been feeling well from a GI standpoint  She denies any abdominal pain  She reports she has some nausea yesterday however no episodes of vomiting  At home she reports her appetite was normal   She denies any NSAIDs or recent steroid use  She reports having some reflux and takes Pepcid at bedtime  She reports some constipation over the last few days while in the hospital followed by her few episodes of melena yesterday  Discussed with nursing at bedside who reports no bowel movement so far today  Abdominal surgeries consistent nephrectomy due to kidney cancer, total hysterectomy, ectopic pregnancy, cholecystectomy  Patient with unknown family history  Patient reports quit smoking around 16 years ago  Patient had EGD 08/2021 for anemia without any signs of GI bleeding found to have small hiatal hernia with hemorrhagic erosions in the midbody without active bleeding seen as well as distal esophageal erythema  She had colonoscopy a few days later showing sigmoid diverticulosis however no blood noted    She then had a repeat EGD push that month which was advanced up to the proximal jejunum with no blood noted  REVIEW OF SYSTEMS:    CONSTITUTIONAL: Denies any fever, chills, or rigors  Good appetite, and no recent weight loss  HEENT: No earache or tinnitus  Denies hearing loss or visual disturbances  CARDIOVASCULAR: No chest pain or palpitations  RESPIRATORY: +cough, SOB, TERESA  GASTROINTESTINAL: As noted in the History of Present Illness  GENITOURINARY: No problems with urination  Denies any hematuria or dysuria  NEUROLOGIC: No dizziness or vertigo, denies headaches  MUSCULOSKELETAL: Denies any muscle or joint pain  SKIN: Denies skin rashes or itching  ENDOCRINE: Denies excessive thirst  Denies intolerance to heat or cold  PSYCHOSOCIAL: Denies depression or anxiety  Denies any recent memory loss  Historical Information   Past Medical History:   Diagnosis Date   • Anemia    • Arthritis    • Cancer of kidney ([facility] )    • Chronic kidney disease    • Diabetes mellitus ([facility] )    • Disease of thyroid gland    • HLD (hyperlipidemia)    • Hypertension    • Ovarian cancer ([facility] )    • Pneumonia      Past Surgical History:   Procedure Laterality Date   • CHOLECYSTECTOMY     • CT GUIDED PERC DRAINAGE CATHETER PLACEMENT  2016   • GALLBLADDER SURGERY  2004   • HYSTERECTOMY  2018   • NEPHRECTOMY Right 2018     Social History   Social History     Substance and Sexual Activity   Alcohol Use Never    Comment: n/a     Social History     Substance and Sexual Activity   Drug Use Yes   • Types: Marijuana    Comment: smokes with girlfriend when anxious     Social History     Tobacco Use   Smoking Status Former   • Packs/day: 3 00   • Years: 30 00   • Pack years: 90 00   • Types: Cigarettes   • Quit date: 10/22/2010   • Years since quittin 1   Smokeless Tobacco Former   • Quit date: 2011   Tobacco Comments    quit approx   9 years ago     Family History   Problem Relation Age of Onset   • No Known Problems Mother    • No Known Problems Father        Meds/Allergies Medications Prior to Admission   Medication   • albuterol (2 5 mg/3 mL) 0 083 % nebulizer solution   • albuterol (PROVENTIL HFA,VENTOLIN HFA) 90 mcg/act inhaler   • amLODIPine (NORVASC) 5 mg tablet   • atorvastatin (LIPITOR) 10 mg tablet   • Blood Glucose Monitoring Suppl (Fantomace Pro Glucose Meter) SAVANNAH   • [] cefpodoxime (VANTIN) 200 mg tablet   • collagenase (SANTYL) ointment   • Embrace Lancets Ultra Thin 30G MISC   • famotidine (PEPCID) 20 mg tablet   • ferrous sulfate 325 (65 Fe) mg tablet   • levothyroxine 100 mcg tablet   • LORazepam (ATIVAN) 0 5 mg tablet   • metoprolol tartrate (LOPRESSOR) 50 mg tablet   • oxygen gas   • warfarin (COUMADIN) 3 mg tablet     Current Facility-Administered Medications   Medication Dose Route Frequency   • acetaminophen (TYLENOL) tablet 650 mg  650 mg Oral Q6H PRN   • atorvastatin (LIPITOR) tablet 10 mg  10 mg Oral Daily   • budesonide (PULMICORT) inhalation solution 0 5 mg  0 5 mg Nebulization Q12H   • collagenase (SANTYL) ointment   Topical Daily   • formoterol (PERFOROMIST) nebulizer solution 20 mcg  20 mcg Nebulization Q12H   • guaiFENesin (MUCINEX) 12 hr tablet 1,200 mg  1,200 mg Oral BID   • hydrOXYzine HCL (ATARAX) tablet 25 mg  25 mg Oral HS PRN   • insulin lispro (HumaLOG) 100 units/mL subcutaneous injection 2-12 Units  2-12 Units Subcutaneous TID AC   • insulin lispro (HumaLOG) 100 units/mL subcutaneous injection 2-12 Units  2-12 Units Subcutaneous HS   • ipratropium (ATROVENT) 0 02 % inhalation solution 0 5 mg  0 5 mg Nebulization TID   • levalbuterol (XOPENEX) inhalation solution 1 25 mg  1 25 mg Nebulization TID   • levothyroxine tablet 100 mcg  100 mcg Oral Early Morning   • melatonin tablet 6 mg  6 mg Oral HS   • metoprolol tartrate (LOPRESSOR) tablet 50 mg  50 mg Oral BID   • ondansetron (ZOFRAN) injection 4 mg  4 mg Intravenous Q8H PRN   • pantoprazole (PROTONIX) 80 mg in sodium chloride 0 9 % 100 mL infusion  8 mg/hr Intravenous Continuous   • polyethylene glycol (MIRALAX) packet 17 g  17 g Oral Daily   • predniSONE tablet 40 mg  40 mg Oral Daily    Followed by   • [START ON 11/28/2022] predniSONE tablet 30 mg  30 mg Oral Daily    Followed by   • [START ON 12/1/2022] predniSONE tablet 10 mg  10 mg Oral Daily   • senna (SENOKOT) tablet 8 6 mg  1 tablet Oral BID       No Known Allergies        Objective     Blood pressure 102/51, pulse 89, temperature (!) 97 °F (36 1 °C), temperature source Axillary, resp  rate 18, height 5' 4" (1 626 m), weight (!) 146 kg (322 lb 8 5 oz), SpO2 100 %, not currently breastfeeding  Intake/Output Summary (Last 24 hours) at 11/25/2022 0753  Last data filed at 11/25/2022 0600  Gross per 24 hour   Intake 1598 83 ml   Output 1675 ml   Net -76 17 ml         PHYSICAL EXAM:      General Appearance:   Alert, cooperative, no distress  Coughing during exam    HEENT:   Normocephalic, atraumatic, anicteric  Neck:  Supple, symmetrical, trachea midline, no adenopathy  Lungs:   Decreased breath sounds, On 3 L O2  Coughing during exAM   Heart[de-identified]   Regular rate, irregular rhythm   Abdomen:   Soft, non-tender, non-distended; normal bowel sounds; no masses, no organomegaly    Genitalia:   Deferred    Rectal:   Deferred    Extremities:  + bilateral pitting edema    Pulses:  2+ and symmetric all extremities    Skin:  Skin color, texture, turgor normal, no rashes or lesions    Lymph nodes:  No palpable cervical, axillary or inguinal lymphadenopathy        Lab Results:   Results from last 7 days   Lab Units 11/25/22  0550 11/24/22  0550 11/23/22  0356   WBC Thousand/uL 13 09*   < > 8 23   HEMOGLOBIN g/dL 8 0*   < > 8 6*   HEMATOCRIT % 26 9*   < > 30 8*   PLATELETS Thousands/uL 223   < > 236   NEUTROS PCT %  --   --  80*   LYMPHS PCT %  --   --  9*   LYMPHO PCT % 17   < >  --    MONOS PCT %  --   --  8   MONO PCT % 7   < >  --    EOS PCT % 2   < > 0    < > = values in this interval not displayed       Results from last 7 days   Lab Units 11/25/22  0453 11/24/22  0550 11/23/22  0356   POTASSIUM mmol/L 5 2   < > 4 6   CHLORIDE mmol/L 98   < > 97   CO2 mmol/L 39*   < > 37*   BUN mg/dL 98*   < > 38*   CREATININE mg/dL 1 51*   < > 1 69*   CALCIUM mg/dL 9 3   < > 9 2   ALK PHOS U/L  --   --  118*   ALT U/L  --   --  9   AST U/L  --   --  15    < > = values in this interval not displayed  Results from last 7 days   Lab Units 11/25/22  0453   INR  2 83*           Imaging Studies: I have personally reviewed pertinent imaging studies  No results found  Patient was seen and examined by Dr Yenni Carmona  All [place] medical decisions were made by Dr Yenni Carmona  Thank you for allowing us to participate in the care of this present patient  We will follow-up with you closely

## 2022-11-25 NOTE — ASSESSMENT & PLAN NOTE
Recent Labs     11/23/22  0356 11/24/22  0550 11/25/22  0453   INR 2 88* 3 32* 2 83*       · Currently rate controlled  · Continue home metoprolol  · Patient with 3 episodes of melanotic stool on 11/24/22 with drop in Hgb required 2 units of prbc total  · No additional BMs yet and Hgb improving    Plan  · Continue to hold home coumadin with supratherapeutic INR  · Monitor for signs of bleeding with BMs  · Restart Coumadin when in therapeutic range vs reversal w/ FFP/PCC as needed if bleeding and drop in Hgb recurs

## 2022-11-25 NOTE — ASSESSMENT & PLAN NOTE
· In the setting of COPD/asthma with acute exacerbation and recent PNA   · At baseline, requires 4L NC, O2 requirement now at baseline  · S/p 5 day course abx for pneumonia    Plan  · Continue BIPAP HS and PRN for SASHA/OHS, encourage compliance  · Continue scheduled nebs and steroids for COPD exacerbation - consider transition to prednisone taper today   · Encourage cough, deep breathing, IS, ambulation with assistance

## 2022-11-25 NOTE — ASSESSMENT & PLAN NOTE
· Likely in the setting of acute on chronic hypoxic and hypercapnic respiratory failure, now improving    Plan  · Continue BIPAP PRN and HS  · Regulate sleep/wake  · Delirium precautions  · Frequent neuro checks and reorientation

## 2022-11-25 NOTE — PROGRESS NOTES
-- Patient: Gurpreet Anderson  -- MRN: [id number]  -- Aidin Request ID: [id number]  -- Level of care reserved: translation missing: en provider  provider_type  -- Partner Reserved:  -- Clinical needs requested:  -- Geography searched: [phone]  -- Start of Service:  -- Request sent: 2:50pm EST on 11/23/2022 by Pallavi Lala  -- Partner reserved: by Rafael Vail  -- Choice list shared: 1:56pm EST on 11/25/2022 by Rafael Vail

## 2022-11-26 PROBLEM — D62 ACUTE BLOOD LOSS ANEMIA: Status: ACTIVE | Noted: 2022-11-26

## 2022-11-26 LAB
ABO GROUP BLD BPU: NORMAL
ANION GAP SERPL CALCULATED.3IONS-SCNC: 2 MMOL/L (ref 4–13)
ANISOCYTOSIS BLD QL SMEAR: PRESENT
BASOPHILS # BLD MANUAL: 0 THOUSAND/UL (ref 0–0.1)
BASOPHILS NFR MAR MANUAL: 0 % (ref 0–1)
BPU ID: NORMAL
BUN SERPL-MCNC: 107 MG/DL (ref 5–25)
CALCIUM SERPL-MCNC: 9.2 MG/DL (ref 8.4–10.2)
CHLORIDE SERPL-SCNC: 100 MMOL/L (ref 96–108)
CO2 SERPL-SCNC: 41 MMOL/L (ref 21–32)
CREAT SERPL-MCNC: 1.62 MG/DL (ref 0.6–1.3)
CROSSMATCH: NORMAL
CROSSMATCH: NORMAL
EOSINOPHIL # BLD MANUAL: 0 THOUSAND/UL (ref 0–0.4)
EOSINOPHIL NFR BLD MANUAL: 0 % (ref 0–6)
ERYTHROCYTE [DISTWIDTH] IN BLOOD BY AUTOMATED COUNT: 22 % (ref 11.6–15.1)
GFR SERPL CREATININE-BSD FRML MDRD: 33 ML/MIN/1.73SQ M
GLUCOSE SERPL-MCNC: 165 MG/DL (ref 65–140)
GLUCOSE SERPL-MCNC: 183 MG/DL (ref 65–140)
GLUCOSE SERPL-MCNC: 233 MG/DL (ref 65–140)
GLUCOSE SERPL-MCNC: 279 MG/DL (ref 65–140)
GLUCOSE SERPL-MCNC: 281 MG/DL (ref 65–140)
HCT VFR BLD AUTO: 24.1 % (ref 34.8–46.1)
HGB BLD-MCNC: 6.4 G/DL (ref 11.5–15.4)
HGB BLD-MCNC: 7 G/DL (ref 11.5–15.4)
HGB BLD-MCNC: 7.2 G/DL (ref 11.5–15.4)
HGB BLD-MCNC: 7.3 G/DL (ref 11.5–15.4)
INR PPP: 2.19 (ref 0.84–1.19)
LYMPHOCYTES # BLD AUTO: 0.92 THOUSAND/UL (ref 0.6–4.47)
LYMPHOCYTES # BLD AUTO: 8 % (ref 14–44)
MCH RBC QN AUTO: 28.6 PG (ref 26.8–34.3)
MCHC RBC AUTO-ENTMCNC: 30.3 G/DL (ref 31.4–37.4)
MCV RBC AUTO: 95 FL (ref 82–98)
MONOCYTES # BLD AUTO: 1.61 THOUSAND/UL (ref 0–1.22)
MONOCYTES NFR BLD: 14 % (ref 4–12)
MYELOCYTES NFR BLD MANUAL: 1 % (ref 0–1)
NEUTROPHILS # BLD MANUAL: 8.72 THOUSAND/UL (ref 1.85–7.62)
NEUTS SEG NFR BLD AUTO: 76 % (ref 43–75)
NRBC BLD AUTO-RTO: 2 /100 WBC (ref 0–2)
PLATELET # BLD AUTO: 177 THOUSANDS/UL (ref 149–390)
PLATELET BLD QL SMEAR: ABNORMAL
PMV BLD AUTO: 11 FL (ref 8.9–12.7)
POLYCHROMASIA BLD QL SMEAR: PRESENT
POTASSIUM SERPL-SCNC: 4.7 MMOL/L (ref 3.5–5.3)
PROTHROMBIN TIME: 24.6 SECONDS (ref 11.6–14.5)
RBC # BLD AUTO: 2.55 MILLION/UL (ref 3.81–5.12)
SODIUM SERPL-SCNC: 143 MMOL/L (ref 135–147)
UNIT DISPENSE STATUS: NORMAL
UNIT PRODUCT CODE: NORMAL
UNIT PRODUCT VOLUME: 280 ML
UNIT PRODUCT VOLUME: 350 ML
UNIT PRODUCT VOLUME: 350 ML
UNIT RH: NORMAL
VARIANT LYMPHS # BLD AUTO: 1 %
WBC # BLD AUTO: 11.48 THOUSAND/UL (ref 4.31–10.16)

## 2022-11-26 RX ORDER — HYDROXYZINE 50 MG/1
50 TABLET, FILM COATED ORAL
Status: DISCONTINUED | OUTPATIENT
Start: 2022-11-26 | End: 2022-12-03 | Stop reason: HOSPADM

## 2022-11-26 RX ORDER — HYDROXYZINE HYDROCHLORIDE 25 MG/1
50 TABLET, FILM COATED ORAL
Status: DISCONTINUED | OUTPATIENT
Start: 2022-11-26 | End: 2022-11-26

## 2022-11-26 RX ADMIN — Medication 6 MG: at 21:00

## 2022-11-26 RX ADMIN — INSULIN LISPRO 6 UNITS: 100 INJECTION, SOLUTION INTRAVENOUS; SUBCUTANEOUS at 11:48

## 2022-11-26 RX ADMIN — LEVOTHYROXINE SODIUM 100 MCG: 100 TABLET ORAL at 05:40

## 2022-11-26 RX ADMIN — LEVALBUTEROL HYDROCHLORIDE 1.25 MG: 1.25 SOLUTION, CONCENTRATE RESPIRATORY (INHALATION) at 07:14

## 2022-11-26 RX ADMIN — IPRATROPIUM BROMIDE 0.5 MG: 0.5 SOLUTION RESPIRATORY (INHALATION) at 13:58

## 2022-11-26 RX ADMIN — PANTOPRAZOLE SODIUM 40 MG: 40 INJECTION, POWDER, FOR SOLUTION INTRAVENOUS at 08:13

## 2022-11-26 RX ADMIN — POLYETHYLENE GLYCOL 3350 17 G: 17 POWDER, FOR SOLUTION ORAL at 08:09

## 2022-11-26 RX ADMIN — ATORVASTATIN CALCIUM 10 MG: 10 TABLET, FILM COATED ORAL at 08:09

## 2022-11-26 RX ADMIN — BUDESONIDE 0.5 MG: 0.5 INHALANT ORAL at 19:45

## 2022-11-26 RX ADMIN — GUAIFENESIN 1200 MG: 600 TABLET, EXTENDED RELEASE ORAL at 08:08

## 2022-11-26 RX ADMIN — PANTOPRAZOLE SODIUM 40 MG: 40 INJECTION, POWDER, FOR SOLUTION INTRAVENOUS at 20:09

## 2022-11-26 RX ADMIN — COLLAGENASE SANTYL: 250 OINTMENT TOPICAL at 17:16

## 2022-11-26 RX ADMIN — FORMOTEROL FUMARATE DIHYDRATE 20 MCG: 20 SOLUTION RESPIRATORY (INHALATION) at 07:37

## 2022-11-26 RX ADMIN — INSULIN LISPRO 6 UNITS: 100 INJECTION, SOLUTION INTRAVENOUS; SUBCUTANEOUS at 17:14

## 2022-11-26 RX ADMIN — LEVALBUTEROL HYDROCHLORIDE 1.25 MG: 1.25 SOLUTION, CONCENTRATE RESPIRATORY (INHALATION) at 19:45

## 2022-11-26 RX ADMIN — SENNOSIDES 8.6 MG: 8.6 TABLET, FILM COATED ORAL at 17:13

## 2022-11-26 RX ADMIN — BUDESONIDE 0.5 MG: 0.5 INHALANT ORAL at 07:15

## 2022-11-26 RX ADMIN — HYDROXYZINE HYDROCHLORIDE 50 MG: 50 TABLET, FILM COATED ORAL at 21:00

## 2022-11-26 RX ADMIN — IPRATROPIUM BROMIDE 0.5 MG: 0.5 SOLUTION RESPIRATORY (INHALATION) at 07:14

## 2022-11-26 RX ADMIN — METOPROLOL TARTRATE 50 MG: 50 TABLET, FILM COATED ORAL at 08:09

## 2022-11-26 RX ADMIN — FORMOTEROL FUMARATE DIHYDRATE 20 MCG: 20 SOLUTION RESPIRATORY (INHALATION) at 19:45

## 2022-11-26 RX ADMIN — LEVALBUTEROL HYDROCHLORIDE 1.25 MG: 1.25 SOLUTION, CONCENTRATE RESPIRATORY (INHALATION) at 13:58

## 2022-11-26 RX ADMIN — PREDNISONE 40 MG: 20 TABLET ORAL at 08:08

## 2022-11-26 RX ADMIN — INSULIN LISPRO 4 UNITS: 100 INJECTION, SOLUTION INTRAVENOUS; SUBCUTANEOUS at 21:00

## 2022-11-26 RX ADMIN — IPRATROPIUM BROMIDE 0.5 MG: 0.5 SOLUTION RESPIRATORY (INHALATION) at 19:45

## 2022-11-26 RX ADMIN — GUAIFENESIN 1200 MG: 600 TABLET, EXTENDED RELEASE ORAL at 17:12

## 2022-11-26 RX ADMIN — METOPROLOL TARTRATE 50 MG: 50 TABLET, FILM COATED ORAL at 20:09

## 2022-11-26 NOTE — ASSESSMENT & PLAN NOTE
Recent Labs     11/25/22  1923 11/25/22  2334 11/26/22  0438   HGB 6 7* 7 6* 7 3*   ·   · Status post 4 units of PRBC, 4 units of FFP and 1 dose of vitamin K 5 mg  · GI was consulted  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    · Continue Protonix IV  · Advance diet as tolerated  · Monitor hemoglobin

## 2022-11-26 NOTE — PROGRESS NOTES
Progress Note - Bea Metz 60 y o  female MRN: [id number]    Unit/Bed#: ICU 06 Encounter: [id number]    Assessment and Plan:     1  Acute on chronic anemia with melena in the setting of small bowel AVMs  Patient had EGD push enteroscopy yesterday with multiple AVMs in the duodenum status post APC  There was also small AVM in the stomach  Her INR this morning was 2 1  Hemoglobin remaining stable at 7 3  BUN has increased to 107  No bowel movements overnight  No nausea or vomiting  Tolerating clear liquids        -continue PPI IV b i d   -continue monitor stool output, hemoglobin  -patient will likely need capsule endoscopy in the outpatient setting for further small bowel AVMs  -warfarin is currently on hold with INR of 2 1   -discussed with ICU team  -continue clear liquids for now to ensure hemoglobin remains stable   -patient higher risk of bleeding in the setting of anticoagulation with suspected small bowel AVMs     ----------------------------------------------------------------------------------------------------------------    Subjective:     Patient reports she is doing well from a GI standpoint  No bowel movements overnight  She tolerated clear liquids without difficulty today  No nausea, vomiting  Objective:     Vitals: Blood pressure 129/60, pulse 83, temperature 97 7 °F (36 5 °C), temperature source Oral, resp  rate (!) 23, height 5' 4" (1 626 m), weight (!) 146 kg (321 lb 6 9 oz), SpO2 100 %, not currently breastfeeding  ,Body mass index is 55 17 kg/m²  Intake/Output Summary (Last 24 hours) at 11/26/2022 1042  Last data filed at 11/26/2022 1000  Gross per 24 hour   Intake 1769 5 ml   Output 2950 ml   Net -1180 5 ml       Physical Exam:     General Appearance: Alert, appears stated age and cooperative  Lying comfortably in bed  No distress  Lungs: Clear to auscultation bilaterally, no rales or rhonchi, no labored breathing/accessory muscle use    On oxygen  Heart: + AFib on monitor, regular rate  Abdomen: Soft, non-tender, non-distended; bowel sounds normal; no masses or no organomegaly  Benign abdomen  Extremities: No cyanosis, clubbing, or edema    Invasive Devices     Peripheral Intravenous Line  Duration           Peripheral IV 11/24/22 Right;Ventral (anterior) Forearm 2 days    Peripheral IV 11/24/22 Distal;Left;Upper;Ventral (anterior) Arm 1 day    Long-Dwell Peripheral IV (Midline) 10/53/03 Left Cephalic <1 day    Peripheral IV 11/25/22 Proximal;Right;Upper;Ventral (anterior) Arm <1 day          Drain  Duration           External Urinary Catheter 4 days                Lab Results:  Results from last 7 days   Lab Units 11/26/22 0438 11/24/22  0550 11/23/22  0356   WBC Thousand/uL 11 48*   < > 8 23   HEMOGLOBIN g/dL 7 3*   < > 8 6*   HEMATOCRIT % 24 1*   < > 30 8*   PLATELETS Thousands/uL 177   < > 236   NEUTROS PCT %  --   --  80*   LYMPHS PCT %  --   --  9*   LYMPHO PCT % 8*   < >  --    MONOS PCT %  --   --  8   MONO PCT % 14*   < >  --    EOS PCT % 0   < > 0    < > = values in this interval not displayed  Results from last 7 days   Lab Units 11/26/22 0438 11/24/22  0550 11/23/22  0356   POTASSIUM mmol/L 4 7   < > 4 6   CHLORIDE mmol/L 100   < > 97   CO2 mmol/L 41*   < > 37*   BUN mg/dL 107*   < > 38*   CREATININE mg/dL 1 62*   < > 1 69*   CALCIUM mg/dL 9 2   < > 9 2   ALK PHOS U/L  --   --  118*   ALT U/L  --   --  9   AST U/L  --   --  15    < > = values in this interval not displayed  Invalid input(s): BILI  Results from last 7 days   Lab Units 11/26/22  0438   INR  2 19*           Imaging Studies: I have personally reviewed pertinent imaging studies  EGD with Push Enteroscopy    Result Date: 11/25/2022  Impression: 1  Diminutive gastric AVM, ablated using APC  2  Multiple duodenal AVMs, intermittently oozing noted in D3, obliterated using APC  3  Mild gastritis RECOMMENDATION:  There is no recommended follow-up for this procedure   Monitor H&H and transfuse as needed  Continue Protonix IV b i d  Monitor the color of the stool  Hold anticoagulation at this time if possible  May start on clear liquid diet today     Rosemarie Mcclain MD

## 2022-11-26 NOTE — PLAN OF CARE
Problem: Potential for Falls  Goal: Patient will remain free of falls  Description: INTERVENTIONS:  - Educate patient/family on patient safety including physical limitations  - Instruct patient to call for assistance with activity   - Consult OT/PT to assist with strengthening/mobility   - Keep Call bell within reach  - Keep bed low and locked with side rails adjusted as appropriate  - Keep care items and personal belongings within reach  - Initiate and maintain comfort rounds  - Make Fall Risk Sign visible to staff  - Apply yellow socks and bracelet for high fall risk patients  - Consider moving patient to room near nurses station  Outcome: Progressing     Problem: MOBILITY - ADULT  Goal: Maintain or return to baseline ADL function  Description: INTERVENTIONS:  -  Assess patient's ability to carry out ADLs; assess patient's baseline for ADL function and identify physical deficits which impact ability to perform ADLs (bathing, care of mouth/teeth, toileting, grooming, dressing, etc )  - Assess/evaluate cause of self-care deficits   - Assess range of motion  - Assess patient's mobility; develop plan if impaired  - Assess patient's need for assistive devices and provide as appropriate  - Encourage maximum independence but intervene and supervise when necessary  - Involve family in performance of ADLs  - Assess for home care needs following discharge   - Consider OT consult to assist with ADL evaluation and planning for discharge  - Provide patient education as appropriate  Outcome: Progressing  Goal: Maintains/Returns to pre admission functional level  Description: INTERVENTIONS:  - Perform BMAT or MOVE assessment daily    - Set and communicate daily mobility goal to care team and patient/family/caregiver     - Collaborate with rehabilitation services on mobility goals if consulted  - Out of bed for toileting  - Record patient progress and toleration of activity level   Outcome: Progressing     Problem: Prexisting or High Potential for Compromised Skin Integrity  Goal: Skin integrity is maintained or improved  Description: INTERVENTIONS:  - Identify patients at risk for skin breakdown  - Assess and monitor skin integrity  - Assess and monitor nutrition and hydration status  - Monitor labs   - Assess for incontinence   - Turn and reposition patient  - Assist with mobility/ambulation  - Relieve pressure over bony prominences  - Avoid friction and shearing  - Provide appropriate hygiene as needed including keeping skin clean and dry  - Evaluate need for skin moisturizer/barrier cream  - Collaborate with interdisciplinary team   - Patient/family teaching  - Consider wound care consult   Outcome: Progressing     Problem: Nutrition/Hydration-ADULT  Goal: Nutrient/Hydration intake appropriate for improving, restoring or maintaining nutritional needs  Description: Monitor and assess patient's nutrition/hydration status for malnutrition  Collaborate with interdisciplinary team and initiate plan and interventions as ordered  Monitor patient's weight and dietary intake as ordered or per policy  Utilize nutrition screening tool and intervene as necessary  Determine patient's food preferences and provide high-protein, high-caloric foods as appropriate       INTERVENTIONS:  - Monitor oral intake, urinary output, labs, and treatment plans  - Assess nutrition and hydration status and recommend course of action  - Evaluate amount of meals eaten  - Assist patient with eating if necessary   - Allow adequate time for meals  - Recommend/ encourage appropriate diets, oral nutritional supplements, and vitamin/mineral supplements  - Order, calculate, and assess calorie counts as needed  - Recommend, monitor, and adjust tube feedings and TPN/PPN based on assessed needs  - Assess need for intravenous fluids  - Provide specific nutrition/hydration education as appropriate  - Include patient/family/caregiver in decisions related to nutrition  Outcome: Progressing     Problem: PAIN - ADULT  Goal: Verbalizes/displays adequate comfort level or baseline comfort level  Description: Interventions:  - Encourage patient to monitor pain and request assistance  - Assess pain using appropriate pain scale  - Administer analgesics based on type and severity of pain and evaluate response  - Implement non-pharmacological measures as appropriate and evaluate response  - Consider cultural and social influences on pain and pain management  - Notify physician/advanced practitioner if interventions unsuccessful or patient reports new pain  Outcome: Progressing     Problem: INFECTION - ADULT  Goal: Absence or prevention of progression during hospitalization  Description: INTERVENTIONS:  - Assess and monitor for signs and symptoms of infection  - Monitor lab/diagnostic results  - Monitor all insertion sites, i e  indwelling lines, tubes, and drains  - Monitor endotracheal if appropriate and nasal secretions for changes in amount and color  - [place] appropriate cooling/warming therapies per order  - Administer medications as ordered  - Instruct and encourage patient and family to use good hand hygiene technique  - Identify and instruct in appropriate isolation precautions for identified infection/condition  Outcome: Progressing     Problem: SAFETY ADULT  Goal: Patient will remain free of falls  Description: INTERVENTIONS:  - Educate patient/family on patient safety including physical limitations  - Instruct patient to call for assistance with activity   - Consult OT/PT to assist with strengthening/mobility   - Keep Call bell within reach  - Keep bed low and locked with side rails adjusted as appropriate  - Keep care items and personal belongings within reach  - Initiate and maintain comfort rounds  - Make Fall Risk Sign visible to staff  - Apply yellow socks and bracelet for high fall risk patients  - Consider moving patient to room near nurses station  Outcome: Progressing  Goal: Maintain or return to baseline ADL function  Description: INTERVENTIONS:  -  Assess patient's ability to carry out ADLs; assess patient's baseline for ADL function and identify physical deficits which impact ability to perform ADLs (bathing, care of mouth/teeth, toileting, grooming, dressing, etc )  - Assess/evaluate cause of self-care deficits   - Assess range of motion  - Assess patient's mobility; develop plan if impaired  - Assess patient's need for assistive devices and provide as appropriate  - Encourage maximum independence but intervene and supervise when necessary  - Involve family in performance of ADLs  - Assess for home care needs following discharge   - Consider OT consult to assist with ADL evaluation and planning for discharge  - Provide patient education as appropriate  Outcome: Progressing  Goal: Maintains/Returns to pre admission functional level  Description: INTERVENTIONS:  - Perform BMAT or MOVE assessment daily    - Set and communicate daily mobility goal to care team and patient/family/caregiver     - Collaborate with rehabilitation services on mobility goals if consulted  - Out of bed for toileting  - Record patient progress and toleration of activity level   Outcome: Progressing     Problem: DISCHARGE PLANNING  Goal: Discharge to home or other facility with appropriate resources  Description: INTERVENTIONS:  - Identify barriers to discharge w/patient and caregiver  - Arrange for needed discharge resources and transportation as appropriate  - Identify discharge learning needs (meds, wound care, etc )  - Arrange for interpretive services to assist at discharge as needed  - Refer to Case Management Department for coordinating discharge planning if the patient needs post-hospital services based on physician/advanced practitioner order or complex needs related to functional status, cognitive ability, or social support system  Outcome: Progressing     Problem: Knowledge Deficit  Goal: Patient/family/caregiver demonstrates understanding of disease process, treatment plan, medications, and discharge instructions  Description: Complete learning assessment and assess knowledge base    Interventions:  - Provide teaching at level of understanding  - Provide teaching via preferred learning methods  Outcome: Progressing     Problem: CARDIOVASCULAR - ADULT  Goal: Maintains optimal cardiac output and hemodynamic stability  Description: INTERVENTIONS:  - Monitor I/O, vital signs and rhythm  - Monitor for S/S and trends of decreased cardiac output  - Administer and titrate ordered vasoactive medications to optimize hemodynamic stability  - Assess quality of pulses, skin color and temperature  - Assess for signs of decreased coronary artery perfusion  - Instruct patient to report change in severity of symptoms  Outcome: Progressing  Goal: Absence of cardiac dysrhythmias or at baseline rhythm  Description: INTERVENTIONS:  - Continuous cardiac monitoring, vital signs, obtain 12 lead EKG if ordered  - Administer antiarrhythmic and heart rate control medications as ordered  - Monitor electrolytes and administer replacement therapy as ordered  Outcome: Progressing     Problem: RESPIRATORY - ADULT  Goal: Achieves optimal ventilation and oxygenation  Description: INTERVENTIONS:  - Assess for changes in respiratory status  - Assess for changes in mentation and behavior  - Position to facilitate oxygenation and minimize respiratory effort  - Oxygen administered by appropriate delivery if ordered  - Initiate smoking cessation education as indicated  - Encourage broncho-pulmonary hygiene including cough, deep breathe, Incentive Spirometry  - Assess the need for suctioning and aspirate as needed  - Assess and instruct to report SOB or any respiratory difficulty  - Respiratory Therapy support as indicated  Outcome: Progressing     Problem: GASTROINTESTINAL - ADULT  Goal: Maintains or returns to baseline bowel function  Description: INTERVENTIONS:  - Assess bowel function  - Encourage oral fluids to ensure adequate hydration  - Administer IV fluids if ordered to ensure adequate hydration  - Administer ordered medications as needed  - Encourage mobilization and activity  - Consider nutritional services referral to assist patient with adequate nutrition and appropriate food choices  Outcome: Progressing  Goal: Maintains adequate nutritional intake  Description: INTERVENTIONS:  - Monitor percentage of each meal consumed  - Identify factors contributing to decreased intake, treat as appropriate  - Assist with meals as needed  - Monitor I&O, weight, and lab values if indicated  - Obtain nutrition services referral as needed  Outcome: Progressing     Problem: METABOLIC, FLUID AND ELECTROLYTES - ADULT  Goal: Fluid balance maintained  Description: INTERVENTIONS:  - Monitor labs   - Monitor I/O and WT  - Instruct patient on fluid and nutrition as appropriate  - Assess for signs & symptoms of volume excess or deficit  Outcome: Progressing  Goal: Glucose maintained within target range  Description: INTERVENTIONS:  - Monitor Blood Glucose as ordered  - Assess for signs and symptoms of hyperglycemia and hypoglycemia  - Administer ordered medications to maintain glucose within target range  - Assess nutritional intake and initiate nutrition service referral as needed  Outcome: Progressing     Problem: SKIN/TISSUE INTEGRITY - ADULT  Goal: Skin Integrity remains intact(Skin Breakdown Prevention)  Description: Assess:  -Inspect skin when repositioning, toileting, and assisting with ADLS  -Assess extremities for adequate circulation and sensation     Bed Management:  -Have minimal linens on bed & keep smooth, unwrinkled  -Change linens as needed when moist or perspiring    Toileting:  -Offer bedside commode    Activity:  -Encourage activity and walks on unit  -Encourage or provide ROM exercises   -Use appropriate equipment to lift or move patient in bed    Skin Care:  -Avoid use of baby powder, tape, friction and shearing, hot water or constrictive clothing  -Do not massage red bony areas  Outcome: Progressing  Goal: Incision(s), wounds(s) or drain site(s) healing without S/S of infection  Description: INTERVENTIONS  - Assess and document dressing, incision, wound bed, drain sites and surrounding tissue  - Provide patient and family education  Outcome: Progressing  Goal: Pressure injury heals and does not worsen  Description: Interventions:  - Implement low air loss mattress or specialty surface (Criteria met)  - Apply silicone foam dressing  - Apply fecal or urinary incontinence containment device   - Utilize friction reducing device or surface for transfers   - Consider nutrition services referral as needed  Outcome: Progressing     Problem: MUSCULOSKELETAL - ADULT  Goal: Maintain or return mobility to safest level of function  Description: INTERVENTIONS:  - Assess patient's ability to carry out ADLs; assess patient's baseline for ADL function and identify physical deficits which impact ability to perform ADLs (bathing, care of mouth/teeth, toileting, grooming, dressing, etc )  - Assess/evaluate cause of self-care deficits   - Assess range of motion  - Assess patient's mobility  - Assess patient's need for assistive devices and provide as appropriate  - Encourage maximum independence but intervene and supervise when necessary  - Involve family in performance of ADLs  - Assess for home care needs following discharge   - Consider OT consult to assist with ADL evaluation and planning for discharge  - Provide patient education as appropriate  Outcome: Progressing  Goal: Maintain proper alignment of affected body part  Description: INTERVENTIONS:  - Support, maintain and protect limb and body alignment  - Provide patient/ family with appropriate education  Outcome: Progressing

## 2022-11-26 NOTE — ASSESSMENT & PLAN NOTE
· With acute exacerbation  · Continue scheduled xopenex, atrovent, pulmicort, perforomist    Plan  · Continue prednisone taper

## 2022-11-26 NOTE — ASSESSMENT & PLAN NOTE
Recent Labs     11/25/22  1152 11/25/22  1456 11/26/22  0438   INR 2 54* 2 08* 2 19*       · Currently rate controlled  · Continue home metoprolol  · Patient with 3 episodes of melanotic stool on 11/24/22 with drop in Hgb required 2 units of prbc total  · No additional BMs yet and Hgb improving    Plan  · Continue to hold home coumadin with supratherapeutic INR  · Monitor for signs of bleeding with BMs  · Restart Coumadin when in therapeutic range vs reversal w/ FFP/PCC as needed if bleeding and drop in Hgb recurs

## 2022-11-26 NOTE — ASSESSMENT & PLAN NOTE
Lab Results   Component Value Date    HGBA1C 7 0 (H) 11/01/2022       Recent Labs     11/25/22  1113 11/25/22  1643 11/25/22  1735 11/25/22  2114   POCGLU 208* 230* 243* 235*       Blood Sugar Average: Last 72 hrs:    · ACHS glucose checks and SSI for goal glucose 140-180  · Sliding scale  · Hypoglycemic protocol

## 2022-11-26 NOTE — ASSESSMENT & PLAN NOTE
Zurdo Dash was evaluated at [facility] on December 30, 2019 at which time his blood pressure was:    BP Readings from Last 3 Encounters:   12/30/19 (!) 180/94   11/04/19 (!) 142/72   10/24/19 118/76     Pulse Readings from Last 3 Encounters:   11/04/19 64   09/18/19 74   08/16/19 57       Reviewed lifestyle modifications for blood pressure control and reduction: including making healthy food choices, managing weight, getting regular exercise, smoking cessation, reducing alcohol consumption, monitoring blood pressure regularly.     Symptoms: None    BP Goal:< 140/90 mmHg    BP Assessment:  BP critical (SBP > 180 mmHg or DBP > 120 mmHg)    Potential Reasons for BP too high: Dose of BP medication too low    BP Follow-Up Plan: Referral to PCP    Recommendation to Provider: sending patient to clinic for ancillary visit, patient was at the dentist today and had 2 teeth pulled.     Note completed by:Bertha Lei Rph  [facility]  [address] [place] [address] [place] [id number]  [phone]   · Continue home statin

## 2022-11-26 NOTE — ASSESSMENT & PLAN NOTE
Lab Results   Component Value Date    EGFR 33 11/26/2022    EGFR 36 11/25/2022    EGFR 35 11/24/2022    CREATININE 1 62 (H) 11/26/2022    CREATININE 1 51 (H) 11/25/2022    CREATININE 1 54 (H) 11/24/2022     · Baseline Cr: 1 8 - 2 4  · Currently at baseline  · Continue to monitor I/O and renal indices  · Continue PRN diuresis for goal negative 500ml/24 hours   · Monitor BMP Negative

## 2022-11-26 NOTE — PROGRESS NOTES
[facility]  Progress Note - Lio Quinonez 1960, 60 y o  female MRN: [id number]  Unit/Bed#: ICU 06 Encounter: [id number]  Primary Care Provider: Lana Harris MD   Date and time admitted to hospital: 11/21/2022 12:58 PM    * Acute on chronic respiratory failure with hypoxia and hypercapnia (HCC)  Assessment & Plan  · In the setting of COPD/asthma with acute exacerbation and recent PNA   · At baseline, requires 4L NC, O2 requirement now at baseline  · S/p 5 day course abx for pneumonia    Plan  · Continue BIPAP HS and PRN for SASHA/OHS, encourage compliance  · Continue scheduled nebs and steroids for COPD exacerbation - consider transition to prednisone taper today   · Encourage cough, deep breathing, IS, ambulation with assistance     Acute blood loss anemia  Assessment & Plan  Recent Labs     11/25/22  1923 11/25/22  2334 11/26/22  0438   HGB 6 7* 7 6* 7 3*   ·   · Status post 4 units of PRBC, 4 units of FFP and 1 dose of vitamin K 5 mg  · GI was consulted  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    · Continue Protonix IV  · Advance diet as tolerated  · Monitor hemoglobin    Panniculitis  Assessment & Plan  · 11/20 CT C/A/P: acute panniculitis, not appreciated on physical exam  · Monitor off abx      Acute metabolic encephalopathy  Assessment & Plan  · Likely in the setting of acute on chronic hypoxic and hypercapnic respiratory failure, now improving    Plan  · Continue BIPAP PRN and HS  · Regulate sleep/wake  · Delirium precautions  · Frequent neuro checks and reorientation     Bilateral pleural effusion  Assessment & Plan  · S/p diuresis with 40mg IV lasix daily    Plan  · Would continue PRN diuresis for goal -500ml over 24 hours     Pneumonia due to infectious organism  Assessment & Plan  · Diagnosed as OP  · S/p 5 days abx as OP and continued on admssion  · Patient remains afebrile, without leukocytosis, procal negative   · Monitor off abx     Leg wound, left, initial encounter  Assessment & Plan  · Continue local wound care  · Wound nurse following     Diabetes mellitus type 2 in obese [facility])  Assessment & Plan  Lab Results   Component Value Date    HGBA1C 7 0 (H) 11/01/2022       Recent Labs     11/25/22  1113 11/25/22  1643 11/25/22  1735 11/25/22  2114   POCGLU 208* 230* 243* 235*       Blood Sugar Average: Last 72 hrs:    · ACHS glucose checks and SSI for goal glucose 140-180  · Sliding scale  · Hypoglycemic protocol    Morbid (severe) obesity due to excess calories (HCC)  Assessment & Plan  · Lifestyle modifications  · Nutrition consult    Anxiety  Assessment & Plan  · PRN atarax HS     GERD (gastroesophageal reflux disease)  Assessment & Plan  · Continue home pepcid    Hypothyroid  Assessment & Plan  · Continue home levothyroxine     COPD with asthma ([facility] [address] )  Assessment & Plan  · With acute exacerbation  · Continue scheduled xopenex, atrovent, pulmicort, perforomist    Plan  · Continue prednisone taper    Paroxysmal atrial fibrillation [facility])  Assessment & Plan  Recent Labs     11/25/22  1152 11/25/22  1456 11/26/22  0438   INR 2 54* 2 08* 2 19*       · Currently rate controlled  · Continue home metoprolol  · Patient with 3 episodes of melanotic stool on 11/24/22 with drop in Hgb required 2 units of prbc total  · No additional BMs yet and Hgb improving    Plan  · Continue to hold home coumadin with supratherapeutic INR  · Monitor for signs of bleeding with BMs  · Restart Coumadin when in therapeutic range vs reversal w/ FFP/PCC as needed if bleeding and drop in Hgb recurs      Dyslipidemia  Assessment & Plan  · Continue home statin     Chronic kidney disease, stage III (moderate) [facility])  Assessment & Plan  Lab Results   Component Value Date    EGFR 33 11/26/2022    EGFR 36 11/25/2022    EGFR 35 11/24/2022    CREATININE 1 62 (H) 11/26/2022    CREATININE 1 51 (H) 11/25/2022    CREATININE 1 54 (H) 11/24/2022     · Baseline Cr: 1 8 - 2 4  · Currently at baseline  · Continue to monitor I/O and renal indices  · Continue PRN diuresis for goal negative 500ml/24 hours   · Monitor BMP        ----------------------------------------------------------------------------------------  HPI/24hr events: on Bipap for 4 hours    Patient appropriate for transfer out of the ICU today?: No  Disposition: Continue Stepdown Level 1 level of care   Code Status: Level 1 - Full Code  ---------------------------------------------------------------------------------------  SUBJECTIVE  She offer no complaints  She denied chest pain, shortness of breath, fever, chills, or pain anywhere  She denied further bloody stool or melena  Review of Systems   Constitutional: Negative for chills and fever  HENT: Negative for congestion  Eyes: Negative for pain  Respiratory: Negative for shortness of breath  Cardiovascular: Negative for chest pain  Gastrointestinal: Negative for abdominal pain and blood in stool  Genitourinary: Negative for flank pain  Skin: Negative for pallor  Neurological: Negative for headaches  Psychiatric/Behavioral: Negative for confusion       Review of systems was reviewed and negative unless stated above in HPI/24-hour events   ---------------------------------------------------------------------------------------  OBJECTIVE    Vitals   Vitals:    22 0400 22 0500 22 0600 22 0715   BP: 97/63 (!) 94/49 110/56    BP Location:  Left arm     Pulse: 82 78 78    Resp: 17 12 13    Temp:  98 3 °F (36 8 °C)     TempSrc:  Oral     SpO2: 95% 94% 96% 96%   Weight:  (!) 146 kg (321 lb 6 9 oz)     Height:         Temp (24hrs), Av 3 °F (36 8 °C), Min:97 1 °F (36 2 °C), Max:98 8 °F (37 1 °C)  Current: Temperature: 98 3 °F (36 8 °C)  Arterial Line BP: 130/73  Arterial Line MAP (mmHg): 92 mmHg    Respiratory:  SpO2: SpO2: 96 %, SpO2 Activity: SpO2 Activity: At Rest, SpO2 Device: O2 Device: Nasal cannula  Nasal Cannula O2 Flow Rate (L/min): 3 L/min    Invasive/non-invasive ventilation settings   Respiratory    Lab Data (Last 4 hours)    None         O2/Vent Data (Last 4 hours)      11/26 0400          Non-Invasive Ventilation Mode BiPAP  Comment: stand by                   Physical Exam  Vitals and nursing note reviewed  Constitutional:       General: She is not in acute distress  Appearance: She is obese  She is ill-appearing  HENT:      Head: Normocephalic and atraumatic  Mouth/Throat:      Mouth: Mucous membranes are moist    Eyes:      Pupils: Pupils are equal, round, and reactive to light  Cardiovascular:      Rate and Rhythm: Normal rate  Rhythm irregular  Pulses: Normal pulses  Heart sounds: Normal heart sounds  No murmur heard  No friction rub  No gallop  Pulmonary:      Effort: Pulmonary effort is normal  No respiratory distress  Breath sounds: No wheezing, rhonchi or rales  Comments: Limited d/t body habitus  Diminished    Abdominal:      General: Bowel sounds are normal  There is no distension  Palpations: Abdomen is soft  Tenderness: There is no abdominal tenderness  Musculoskeletal:         General: Swelling present  Normal range of motion  Cervical back: Neck supple  Right lower leg: Edema present  Left lower leg: Edema present  Skin:     General: Skin is warm and dry  Capillary Refill: Capillary refill takes less than 2 seconds  Neurological:      General: No focal deficit present  Mental Status: She is alert and oriented to person, place, and time  Cranial Nerves: No cranial nerve deficit  Sensory: No sensory deficit  Motor: No weakness               Laboratory and Diagnostics:  Results from last 7 days   Lab Units 11/26/22  0438 11/25/22  2334 11/25/22  1923 11/25/22  1152 11/25/22  0550 11/25/22  0041 11/24/22  1850 11/24/22  1301 11/24/22  0550 11/23/22  0356 11/22/22  0405 11/21/22  0507 11/20/22  1432   WBC Thousand/uL 11 48*  --   --   -- 13 09*  --   --   --  8 09 8 23 4 95 7 37 7 40   HEMOGLOBIN g/dL 7 3* 7 6* 6 7* 7 1* 8 0* 6 6* 7 4*   < > 7 7* 8 6* 8 0* 8 2* 9 0*   HEMATOCRIT % 24 1*  --   --   --  26 9*  --   --   --  26 7* 30 8* 28 6* 30 7* 32 7*   PLATELETS Thousands/uL 177  --   --   --  223  --   --   --  246 236 239 242 269   NEUTROS PCT %  --   --   --   --   --   --   --   --   --  80*  --   --   --    BANDS PCT %  --   --   --   --  6  --   --   --   --   --   --   --  4   MONOS PCT %  --   --   --   --   --   --   --   --   --  8  --   --   --    MONO PCT % 14*  --   --   --  7  --   --   --  3*  --   --   --  8    < > = values in this interval not displayed       Results from last 7 days   Lab Units 11/26/22  0438 11/25/22  0453 11/24/22  0550 11/23/22  0356 11/22/22  1147 11/22/22  0405 11/21/22  0507 11/20/22  1432   SODIUM mmol/L 143 141 141 139 140 140 140 139   POTASSIUM mmol/L 4 7 5 2 5 0 4 6 5 1 5 0 4 7 5 1   CHLORIDE mmol/L 100 98 96 97 99 101 101 101   CO2 mmol/L 41* 39* 41* 37* 35* 33* 35* 37*   ANION GAP mmol/L 2* 4 4 5 6 6 4 1*   BUN mg/dL 107* 98* 64* 38* 31* 28* 21 20   CREATININE mg/dL 1 62* 1 51* 1 54* 1 69* 1 62* 1 69* 1 63* 1 69*   CALCIUM mg/dL 9 2 9 3 9 4 9 2 9 0 8 6 8 9 9 1   GLUCOSE RANDOM mg/dL 183* 188* 216* 161* 173* 143* 109 121   ALT U/L  --   --   --  9  --  7 6* 6*   AST U/L  --   --   --  15  --  11* 11* 11*   ALK PHOS U/L  --   --   --  118*  --  117* 131* 139*   ALBUMIN g/dL  --   --   --  3 1*  --  2 8* 3 0* 3 3*   TOTAL BILIRUBIN mg/dL  --   --   --  0 28  --  0 21 0 26 0 30     Results from last 7 days   Lab Units 11/25/22  0453 11/24/22  0550 11/23/22  0356 11/22/22  0405 11/21/22  0507   MAGNESIUM mg/dL 2 3 1 8* 2 0 1 7* 1 9   PHOSPHORUS mg/dL 2 8 2 5 2 7 3 4 3 8      Results from last 7 days   Lab Units 11/26/22  0438 11/25/22  1456 11/25/22  1152 11/25/22  0453 11/24/22  0550 11/23/22  0356 11/22/22  0405 11/21/22  0507 11/20/22  1432   INR  2 19* 2 08* 2 54* 2 83* 3 32* 2 88* 2 78*   < > 2 75* PTT seconds  --   --   --   --   --   --   --   --  58*    < > = values in this interval not displayed  Results from last 7 days   Lab Units 11/22/22  1147 11/20/22  1432   LACTIC ACID mmol/L 1 9 0 5     ABG:  Results from last 7 days   Lab Units 11/22/22  1001   PH ART  7 341*   PCO2 ART mm Hg 37 2   PO2 ART mm Hg 95 4   HCO3 ART mmol/L 19 7*   BASE EXC ART mmol/L -5 6   ABG SOURCE  Line, Arterial     VBG:  Results from last 7 days   Lab Units 11/23/22  0912 11/22/22  1148 11/22/22  1001   PH CISCO  7 336   < >  --    PCO2 CISCO mm Hg 80 1*   < >  --    PO2 CISCO mm Hg 68 3*   < >  --    HCO3 CISCO mmol/L 41 9*   < >  --    BASE EXC CISCO mmol/L 13 6   < >  --    ABG SOURCE   --   --  Line, Arterial    < > = values in this interval not displayed  Results from last 7 days   Lab Units 11/21/22  0507 11/20/22  1432   PROCALCITONIN ng/ml 0 16 0 18       Micro  Results from last 7 days   Lab Units 11/20/22  1528 11/20/22  1441 11/20/22  1432   BLOOD CULTURE   --  No Growth After 5 Days  No Growth After 5 Days  URINE CULTURE  No Growth <1000 cfu/mL  --   --    LEGIONELLA URINARY ANTIGEN  Negative  --   --    STREP PNEUMONIAE ANTIGEN, URINE  Negative  --   --        EKG: a fib  Imaging: I have personally reviewed pertinent reports  Intake and Output  I/O       11/24 0701 11/25 0700 11/25 0701  11/26 0700 11/26 0701  11/27 0700    P  O  660 100     I V  (mL/kg) 158 8 (1 1) 422 (2 9)     Blood 700 1847 5     IV Piggyback 80      Total Intake(mL/kg) 1598 8 (11) 2369 5 (16 2)     Urine (mL/kg/hr) 1675 (0 5) 2250 (0 6)     Stool 0      Total Output 1675 2250     Net -76 2 +119 5            Unmeasured Urine Occurrence 2 x      Unmeasured Stool Occurrence 2 x            Height and Weights   Height: 5' 4" (162 6 cm)     Body mass index is 55 17 kg/m²    Weight (last 2 days)     Date/Time Weight    11/26/22 0500 146 (321 43)    11/25/22 0218 146 (322 53)    11/24/22 0242 146 (321 21)            Nutrition       Diet Orders   (From admission, onward)             Start     Ordered    11/26/22 0255  Diet Clear Liquid  Diet effective now        References:    Nutrtion Support Algorithm Enteral vs  Parenteral   Question Answer Comment   Diet Type Clear Liquid    RD to adjust diet per protocol?  Yes        11/26/22 0254                  Active Medications  Scheduled Meds:  Current Facility-Administered Medications   Medication Dose Route Frequency Provider Last Rate   • acetaminophen  650 mg Oral Q6H PRN TULIO Neil     • atorvastatin  10 mg Oral Daily TULIO Neil     • budesonide  0 5 mg Nebulization Q12H Clarita Tobin MD     • collagenase   Topical Daily TULIO Neil     • formoterol  20 mcg Nebulization Q12H Clarita Tobin MD     • guaiFENesin  1,200 mg Oral BID TULIO Neil     • hydrOXYzine HCL  25 mg Oral HS PRN TULIO Pedro     • insulin lispro  2-12 Units Subcutaneous TID AC TULIO Cordon     • insulin lispro  2-12 Units Subcutaneous HS TULIO Cordon     • ipratropium  0 5 mg Nebulization TID TULIO Neil     • levalbuterol  1 25 mg Nebulization TID TULIO Neil     • levothyroxine  100 mcg Oral Early Morning TULIO Neil     • melatonin  6 mg Oral HS Suleiman Alicia     • metoprolol tartrate  50 mg Oral BID Uriah Arias MD     • ondansetron  4 mg Intravenous Q8H PRN TULIO Ortega     • pantoprazole  40 mg Intravenous Q12H [address] TULIO Cordon     • polyethylene glycol  17 g Oral Daily TULIO Cordon     • predniSONE  40 mg Oral Daily Berneice Lala MD      Followed by   • [START ON 11/28/2022] predniSONE  30 mg Oral Daily Berenice Lala MD      Followed by   • [START ON 12/1/2022] predniSONE  10 mg Oral Daily Berenice Lala MD     • senna  1 tablet Oral BID TULIO Cordon       Continuous Infusions:     PRN Meds:   acetaminophen, 650 mg, Q6H PRN  hydrOXYzine HCL, 25 mg, HS PRN  ondansetron, 4 mg, Q8H PRN        Invasive Devices Review  Invasive Devices     Peripheral Intravenous Line  Duration           Peripheral IV 11/24/22 Right;Ventral (anterior) Forearm 2 days    Peripheral IV 11/24/22 Distal;Left;Upper;Ventral (anterior) Arm 1 day    Peripheral IV 11/25/22 Proximal;Right;Upper;Ventral (anterior) Arm <1 day          Drain  Duration           External Urinary Catheter 4 days                Rationale for remaining devices: acute respiratory failure  ---------------------------------------------------------------------------------------  Advance Directive and Living Will:      Power of :    POLST:    ---------------------------------------------------------------------------------------  Care Time Delivered:   No Critical Care time spent       Carmen Chung MD        Physical Activity Assessment and Intervention:    Activity journal reviewed      Tobacco and Toxic Substance Assessment and Intervention:     Tobacco use screening performed      Portions of the record may have been created with voice recognition software  Occasional wrong word or "sound a like" substitutions may have occurred due to the inherent limitations of voice recognition software    Read the chart carefully and recognize, using context, where substitutions have occurred

## 2022-11-26 NOTE — QUICK NOTE
Patient seen and evaluated by Critical Care today and deemed to be appropriate for transfer to Stepdown Level 2  Spoke to Dr Zeke Siemens from [facility] to accept patient transfer  Patient did not move from ICU on the day of transfer  See progress note from 11/26/2022 for the most up-to-date treatment therapy plans  Critical care can be contacted via [employer] with any questions or concerns

## 2022-11-27 ENCOUNTER — APPOINTMENT (OUTPATIENT)
Dept: RADIOLOGY | Facility: HOSPITAL | Age: 62
End: 2022-11-27

## 2022-11-27 ENCOUNTER — APPOINTMENT (INPATIENT)
Dept: CT IMAGING | Facility: HOSPITAL | Age: 62
End: 2022-11-27

## 2022-11-27 LAB
ABO GROUP BLD BPU: NORMAL
ABO GROUP BLD: NORMAL
ANION GAP SERPL CALCULATED.3IONS-SCNC: 3 MMOL/L (ref 4–13)
BLD GP AB SCN SERPL QL: NEGATIVE
BPU ID: NORMAL
BUN SERPL-MCNC: 114 MG/DL (ref 5–25)
CALCIUM SERPL-MCNC: 9.1 MG/DL (ref 8.4–10.2)
CHLORIDE SERPL-SCNC: 98 MMOL/L (ref 96–108)
CO2 SERPL-SCNC: 38 MMOL/L (ref 21–32)
CREAT SERPL-MCNC: 1.62 MG/DL (ref 0.6–1.3)
CROSSMATCH: NORMAL
ERYTHROCYTE [DISTWIDTH] IN BLOOD BY AUTOMATED COUNT: 19.9 % (ref 11.6–15.1)
GFR SERPL CREATININE-BSD FRML MDRD: 33 ML/MIN/1.73SQ M
GLUCOSE SERPL-MCNC: 164 MG/DL (ref 65–140)
GLUCOSE SERPL-MCNC: 200 MG/DL (ref 65–140)
GLUCOSE SERPL-MCNC: 219 MG/DL (ref 65–140)
GLUCOSE SERPL-MCNC: 227 MG/DL (ref 65–140)
GLUCOSE SERPL-MCNC: 291 MG/DL (ref 65–140)
HCT VFR BLD AUTO: 21.5 % (ref 34.8–46.1)
HCT VFR BLD AUTO: 22.7 % (ref 34.8–46.1)
HGB BLD-MCNC: 6.6 G/DL (ref 11.5–15.4)
HGB BLD-MCNC: 6.7 G/DL (ref 11.5–15.4)
HGB BLD-MCNC: 7 G/DL (ref 11.5–15.4)
HGB BLD-MCNC: 7.1 G/DL (ref 11.5–15.4)
INR PPP: 1.68 (ref 0.84–1.19)
LDH SERPL-CCNC: 161 U/L (ref 140–271)
MCH RBC QN AUTO: 29.4 PG (ref 26.8–34.3)
MCHC RBC AUTO-ENTMCNC: 30.8 G/DL (ref 31.4–37.4)
MCV RBC AUTO: 95 FL (ref 82–98)
PLATELET # BLD AUTO: 179 THOUSANDS/UL (ref 149–390)
PMV BLD AUTO: 10.8 FL (ref 8.9–12.7)
POTASSIUM SERPL-SCNC: 5.1 MMOL/L (ref 3.5–5.3)
PROTHROMBIN TIME: 20 SECONDS (ref 11.6–14.5)
RBC # BLD AUTO: 2.38 MILLION/UL (ref 3.81–5.12)
RETICS # AUTO: ABNORMAL 10*3/UL (ref 14097–95744)
RETICS # CALC: 6.47 % (ref 0.37–1.87)
RH BLD: POSITIVE
SODIUM SERPL-SCNC: 139 MMOL/L (ref 135–147)
SPECIMEN EXPIRATION DATE: NORMAL
TSH SERPL DL<=0.05 MIU/L-ACNC: 2.1 UIU/ML (ref 0.45–4.5)
UNIT DISPENSE STATUS: NORMAL
UNIT PRODUCT CODE: NORMAL
UNIT PRODUCT VOLUME: 350 ML
UNIT RH: NORMAL
WBC # BLD AUTO: 15.82 THOUSAND/UL (ref 4.31–10.16)

## 2022-11-27 RX ORDER — POLYETHYLENE GLYCOL 3350 17 G/17G
238 POWDER, FOR SOLUTION ORAL ONCE
Status: DISCONTINUED | OUTPATIENT
Start: 2022-11-27 | End: 2022-11-27

## 2022-11-27 RX ADMIN — INSULIN LISPRO 2 UNITS: 100 INJECTION, SOLUTION INTRAVENOUS; SUBCUTANEOUS at 09:29

## 2022-11-27 RX ADMIN — BUDESONIDE 0.5 MG: 0.5 INHALANT ORAL at 07:23

## 2022-11-27 RX ADMIN — PANTOPRAZOLE SODIUM 40 MG: 40 INJECTION, POWDER, FOR SOLUTION INTRAVENOUS at 21:55

## 2022-11-27 RX ADMIN — LEVALBUTEROL HYDROCHLORIDE 1.25 MG: 1.25 SOLUTION, CONCENTRATE RESPIRATORY (INHALATION) at 19:41

## 2022-11-27 RX ADMIN — IPRATROPIUM BROMIDE 0.5 MG: 0.5 SOLUTION RESPIRATORY (INHALATION) at 07:23

## 2022-11-27 RX ADMIN — HYDROXYZINE HYDROCHLORIDE 50 MG: 50 TABLET, FILM COATED ORAL at 21:55

## 2022-11-27 RX ADMIN — LEVOTHYROXINE SODIUM 100 MCG: 100 TABLET ORAL at 04:29

## 2022-11-27 RX ADMIN — ATORVASTATIN CALCIUM 10 MG: 10 TABLET, FILM COATED ORAL at 09:46

## 2022-11-27 RX ADMIN — GUAIFENESIN 1200 MG: 600 TABLET, EXTENDED RELEASE ORAL at 17:28

## 2022-11-27 RX ADMIN — PREDNISONE 40 MG: 20 TABLET ORAL at 09:46

## 2022-11-27 RX ADMIN — INSULIN LISPRO 4 UNITS: 100 INJECTION, SOLUTION INTRAVENOUS; SUBCUTANEOUS at 18:35

## 2022-11-27 RX ADMIN — METOPROLOL TARTRATE 50 MG: 50 TABLET, FILM COATED ORAL at 21:55

## 2022-11-27 RX ADMIN — SENNOSIDES 8.6 MG: 8.6 TABLET, FILM COATED ORAL at 09:46

## 2022-11-27 RX ADMIN — POLYETHYLENE GLYCOL 3350, SODIUM SULFATE ANHYDROUS, SODIUM BICARBONATE, SODIUM CHLORIDE, POTASSIUM CHLORIDE 4000 ML: 236; 22.74; 6.74; 5.86; 2.97 POWDER, FOR SOLUTION ORAL at 16:40

## 2022-11-27 RX ADMIN — Medication 6 MG: at 21:55

## 2022-11-27 RX ADMIN — GUAIFENESIN 1200 MG: 600 TABLET, EXTENDED RELEASE ORAL at 09:46

## 2022-11-27 RX ADMIN — COLLAGENASE SANTYL: 250 OINTMENT TOPICAL at 10:06

## 2022-11-27 RX ADMIN — IPRATROPIUM BROMIDE 0.5 MG: 0.5 SOLUTION RESPIRATORY (INHALATION) at 14:20

## 2022-11-27 RX ADMIN — FORMOTEROL FUMARATE DIHYDRATE 20 MCG: 20 SOLUTION RESPIRATORY (INHALATION) at 07:23

## 2022-11-27 RX ADMIN — LEVALBUTEROL HYDROCHLORIDE 1.25 MG: 1.25 SOLUTION, CONCENTRATE RESPIRATORY (INHALATION) at 14:20

## 2022-11-27 RX ADMIN — LEVALBUTEROL HYDROCHLORIDE 1.25 MG: 1.25 SOLUTION, CONCENTRATE RESPIRATORY (INHALATION) at 07:23

## 2022-11-27 RX ADMIN — PANTOPRAZOLE SODIUM 40 MG: 40 INJECTION, POWDER, FOR SOLUTION INTRAVENOUS at 09:47

## 2022-11-27 RX ADMIN — BUDESONIDE 0.5 MG: 0.5 INHALANT ORAL at 19:41

## 2022-11-27 RX ADMIN — IPRATROPIUM BROMIDE 0.5 MG: 0.5 SOLUTION RESPIRATORY (INHALATION) at 19:41

## 2022-11-27 RX ADMIN — POLYETHYLENE GLYCOL 3350 17 G: 17 POWDER, FOR SOLUTION ORAL at 09:46

## 2022-11-27 RX ADMIN — FORMOTEROL FUMARATE DIHYDRATE 20 MCG: 20 SOLUTION RESPIRATORY (INHALATION) at 19:41

## 2022-11-27 RX ADMIN — INSULIN LISPRO 4 UNITS: 100 INJECTION, SOLUTION INTRAVENOUS; SUBCUTANEOUS at 13:24

## 2022-11-27 RX ADMIN — INSULIN LISPRO 6 UNITS: 100 INJECTION, SOLUTION INTRAVENOUS; SUBCUTANEOUS at 21:56

## 2022-11-27 RX ADMIN — METOPROLOL TARTRATE 50 MG: 50 TABLET, FILM COATED ORAL at 09:46

## 2022-11-27 NOTE — ASSESSMENT & PLAN NOTE
· In the setting of COPD/asthma with acute exacerbation and recent PNA   · At baseline, requires 4L NC, O2 requirement now at baseline  · S/p 5 day course abx for pneumonia  · CT C/A/P showed multifocal scattered patchy nodular opacities in RUL and possibly RLL, was treated for pneumonia with ceftriaxone this admission  · VBG on 11/28 showed a respiratory acidosis with appropriate compensation     Plan  · Continue BIPAP HS and PRN for SASHA/OHS, encourage compliance  · Continue scheduled nebs and steroids for COPD exacerbation  · Continue prednisone taper  · Encourage cough, deep breathing, IS, ambulation with assistance  · Monitor oxygen requirements

## 2022-11-27 NOTE — ASSESSMENT & PLAN NOTE
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

## 2022-11-27 NOTE — ASSESSMENT & PLAN NOTE
Lab Results   Component Value Date    HGBA1C 7 0 (H) 11/01/2022       Recent Labs     11/26/22  1704 11/26/22 2055 11/27/22  0737 11/27/22  1110   POCGLU 279* 233* 164* 200*       Blood Sugar Average: Last 72 hrs:  (P) 224 9809     · ACHS glucose checks and SSI for goal glucose 140-180  · Sliding scale  · Hypoglycemic protocol

## 2022-11-27 NOTE — ASSESSMENT & PLAN NOTE
Lab Results   Component Value Date    EGFR 33 11/27/2022    EGFR 33 11/26/2022    EGFR 36 11/25/2022    CREATININE 1 62 (H) 11/27/2022    CREATININE 1 62 (H) 11/26/2022    CREATININE 1 51 (H) 11/25/2022     · Baseline Cr: 1 8 - 2 4  · Currently at baseline  · Continue to monitor I/O and renal indices  · Continue PRN diuresis for goal negative 500ml/24 hours   · Monitor BMP

## 2022-11-27 NOTE — ASSESSMENT & PLAN NOTE
Recent Labs     11/25/22  1456 11/26/22  0438 11/27/22  0432   INR 2 08* 2 19* 1 68*       · Currently rate controlled  · Continue home metoprolol  · Patient with 3 episodes of melanotic stool on 11/24/22 with drop in Hgb required 2 units of prbc total  · No additional BMs yet and Hgb improving    Plan  · Continue to hold home coumadin with supratherapeutic INR  · Monitor for signs of bleeding with BMs  · Restart Coumadin when in therapeutic range vs reversal w/ FFP/PCC as needed if bleeding and drop in Hgb recurs

## 2022-11-27 NOTE — ASSESSMENT & PLAN NOTE
Recent Labs     11/25/22  1456 11/26/22  0438 11/27/22  0432   INR 2 08* 2 19* 1 68*     · Currently rate controlled  · Continue home metoprolol  · Patient with 3 episodes of melanotic stool on 11/24/22 with drop in Hgb required 2 units of prbc total  · No additional BMs yet     Plan  · Continue to hold home coumadin with supratherapeutic INR  · Monitor for signs of bleeding with BMs  · Restart Coumadin when in therapeutic range vs reversal w/ FFP/PCC as needed if bleeding and drop in Hgb recurs

## 2022-11-27 NOTE — ASSESSMENT & PLAN NOTE
Lab Results   Component Value Date    HGBA1C 7 0 (H) 11/01/2022       Recent Labs     11/26/22  1704 11/26/22 2055 11/27/22  0737 11/27/22  1110   POCGLU 279* 233* 164* 200*       Blood Sugar Average: Last 72 hrs:  (P) 224 4123     · ACHS glucose checks and SSI for goal glucose 140-180  · Sliding scale  · Hypoglycemic protocol

## 2022-11-27 NOTE — PROGRESS NOTES
Progress Note - Pulmonary   Musa Levo 60 y o  female MRN: [id number]  Unit/Bed#: S -01 Encounter: [id number]    Assessment/Plan:    Acute on chronic hypoxic hypercapnic respiratory failure, multifactorial due to below   Baseline oxygen requirement is 4 liters via nasal cannula  Titrate oxygen to maintain saturations greater than or equal to 89%  Activity as tolerated  Continue BiPAP as listed below  Possible COPD of unknown severity with acute exacerbation   Continue prednisone taper as previously delineated  Continue Pulmicort/Perforomist twice daily with Xopenex/Atrovent 3 times daily  Outpatient pulmonary follow-up and PFTs pending course  Acute blood loss anemia with known gastric and duodenal AVMs   Continue to follow H&H per primary team    Gastroenterology is following  Given continued drop in hemoglobin, consideration for colonoscopy and prep is initiated  Restrictive lung disease due to morbid obesity and elevated hemidiaphragm with SASHA/obesity hypoventilation   Continue BiPAP 14/6 at HS  Chief Complaint:    "I am okay I guess  "    Subjective:      Sreedhar Plaza is sitting up in bed watching TV  She reports she feels okay  She denies shortness of breath, but has a cough and has difficulty expectorating mucus  She did wear BiPAP last night for 5 hours, but has claustrophobia and did not prefer it  No other complaints  Objective:    Vitals: Blood pressure 125/69, pulse (!) 107, temperature 98 6 °F (37 °C), resp  rate 20, height 5' 4" (1 626 m), weight (!) 146 kg (322 lb 1 5 oz), SpO2 97 %, not currently breastfeeding  3 liters nasal cannula,Body mass index is 55 29 kg/m²        Intake/Output Summary (Last 24 hours) at 11/27/2022 1427  Last data filed at 11/27/2022 1300  Gross per 24 hour   Intake 600 ml   Output 1694 ml   Net -1094 ml       Invasive Devices     Peripheral Intravenous Line  Duration           Peripheral IV 11/24/22 Right;Ventral (anterior) Forearm 3 days    Peripheral IV 11/24/22 Distal;Left;Upper;Ventral (anterior) Arm 2 days    Peripheral IV 11/25/22 Proximal;Right;Upper;Ventral (anterior) Arm 2 days    Long-Dwell Peripheral IV (Midline) 56/15/26 Left Cephalic 1 day                Physical Exam:     Physical Exam  Vitals reviewed  Constitutional:       General: She is not in acute distress  Appearance: She is well-developed and well-nourished  She is morbidly obese  She is not toxic-appearing or diaphoretic  Interventions: Nasal cannula in place  HENT:      Head: Normocephalic and atraumatic  Eyes:      General: No scleral icterus  Extraocular Movements: EOM normal    Neck:      Trachea: No tracheal deviation  Cardiovascular:      Rate and Rhythm: Normal rate and regular rhythm  Heart sounds: S1 normal and S2 normal  No murmur heard  No friction rub  No gallop  Pulmonary:      Effort: Pulmonary effort is normal  No tachypnea, accessory muscle usage or respiratory distress  Breath sounds: No stridor  Rhonchi present  No decreased breath sounds, wheezing or rales  Chest:      Chest wall: No tenderness  Abdominal:      General: Bowel sounds are normal  There is no distension  Palpations: Abdomen is soft  Tenderness: There is no abdominal tenderness  Musculoskeletal:      Cervical back: Neck supple  Right lower leg: Edema present  Left lower leg: Edema present  Skin:     General: Skin is warm and dry  Findings: No rash  Nails: There is no cyanosis  Neurological:      Mental Status: She is alert and oriented to person, place, and time  GCS: GCS eye subscore is 4  GCS verbal subscore is 5  GCS motor subscore is 6  Motor: Motor strength is normal    Psychiatric:         Mood and Affect: Mood and affect normal          Speech: Speech normal          Behavior: Behavior normal  Behavior is cooperative         Labs:   CBC:   Lab Results   Component Value Date    WBC 15 82 (H) 11/27/2022 HGB 6 6 (LL) 11/27/2022    HCT 21 5 (L) 11/27/2022    MCV 95 11/27/2022     11/27/2022    MCH 29 4 11/27/2022    MCHC 30 8 (L) 11/27/2022    RDW 19 9 (H) 11/27/2022    MPV 10 8 11/27/2022   , CMP:   Lab Results   Component Value Date    SODIUM 139 11/27/2022    K 5 1 11/27/2022    CL 98 11/27/2022    CO2 38 (H) 11/27/2022     (H) 11/27/2022    CREATININE 1 62 (H) 11/27/2022    CALCIUM 9 1 11/27/2022    EGFR 33 11/27/2022     Imaging and other studies: I have personally reviewed pertinent films in PACS and Chest x-ray done today awaiting final radiologist read, but no gross change from prior

## 2022-11-27 NOTE — PROGRESS NOTES
Progress Note - Negar Joiner 60 y o  female MRN: [id number]    Unit/Bed#: S -01 Encounter: [id number]    Assessment and Plan:     1  Acute on chronic anemia with melena in the setting of small bowel AVMs  EGD push enteroscopy last week with multiple AVMs in the duodenum status post APC as well as 1 the stomach  Her hemoglobin decreased overnight to 6 4 and she is receiving a unit of blood now  Her BUN increasing to 114  Her INR today is 1 68  She reports not having a bowel movement since procedure  Likely in the setting of persistent AVMs possibly small bowel versus colonic     -due to persistent drops in hemoglobin would likely need to pursue colonoscopy due to concern for further AVMs  -continue clear liquid diet for today   -will give bowel prep this afternoon  Patient reports she would prefer MiraLax  -due to significant constipation, if prep is inadequate may need to plan for additional prep tomorrow with procedure on Tuesday   -monitor hemoglobin closely and transfuse as needed per primary team   -continue PPI b i d  Richie Merle -warfarin is currently on hold with INR this morning 1 68  Continue to hold if possible per primary team   Would recommend INR less than 2 for procedures  -patient would likely need capsule endoscopy in the outpatient setting     ----------------------------------------------------------------------------------------------------------------    Subjective:     Patient reports she is doing well  She has no GI complaints  She still has not had a bowel movement  No nausea or vomiting  Objective:     Vitals: Blood pressure 102/81, pulse 105, temperature 98 1 °F (36 7 °C), resp  rate (!) 23, height 5' 4" (1 626 m), weight (!) 146 kg (322 lb 1 5 oz), SpO2 96 %, not currently breastfeeding  ,Body mass index is 55 29 kg/m²        Intake/Output Summary (Last 24 hours) at 11/27/2022 0948  Last data filed at 11/27/2022 0300  Gross per 24 hour   Intake 600 ml   Output 2050 ml   Net -1450 ml       Physical Exam:     General Appearance: Alert, appears stated age  Lying comfortably in bed  Does not appear in distress  On oxygen  Lungs: Clear to auscultation bilaterally, no rales or rhonchi, no labored breathing/accessory muscle use  Heart:  Mildly tachycardic at 102  AFib  Abdomen: Soft, non-tender, non-distended; bowel sounds normal; no masses or no organomegaly  Extremities: + chronic lower extremity skin changes bilaterally    Invasive Devices     Peripheral Intravenous Line  Duration           Peripheral IV 11/24/22 Right;Ventral (anterior) Forearm 3 days    Peripheral IV 11/24/22 Distal;Left;Upper;Ventral (anterior) Arm 2 days    Long-Dwell Peripheral IV (Midline) 93/76/77 Left Cephalic 1 day    Peripheral IV 11/25/22 Proximal;Right;Upper;Ventral (anterior) Arm 1 day                Lab Results:  Results from last 7 days   Lab Units 11/27/22  0432 11/26/22  1154 11/26/22  0438 11/24/22  0550 11/23/22  0356   WBC Thousand/uL 15 82*  --  11 48*   < > 8 23   HEMOGLOBIN g/dL 7 0*   < > 7 3*   < > 8 6*   HEMATOCRIT % 22 7*  --  24 1*   < > 30 8*   PLATELETS Thousands/uL 179  --  177   < > 236   NEUTROS PCT %  --   --   --   --  80*   LYMPHS PCT %  --   --   --   --  9*   LYMPHO PCT %  --   --  8*   < >  --    MONOS PCT %  --   --   --   --  8   MONO PCT %  --   --  14*   < >  --    EOS PCT %  --   --  0   < > 0    < > = values in this interval not displayed  Results from last 7 days   Lab Units 11/27/22  0432 11/24/22  0550 11/23/22  0356   POTASSIUM mmol/L 5 1   < > 4 6   CHLORIDE mmol/L 98   < > 97   CO2 mmol/L 38*   < > 37*   BUN mg/dL 114*   < > 38*   CREATININE mg/dL 1 62*   < > 1 69*   CALCIUM mg/dL 9 1   < > 9 2   ALK PHOS U/L  --   --  118*   ALT U/L  --   --  9   AST U/L  --   --  15    < > = values in this interval not displayed       Invalid input(s): BILI  Results from last 7 days   Lab Units 11/27/22  0432   INR  1 68*           Imaging Studies: I have personally reviewed pertinent imaging studies  EGD with Push Enteroscopy    Result Date: 11/25/2022  Impression: 1  Diminutive gastric AVM, ablated using APC  2  Multiple duodenal AVMs, intermittently oozing noted in D3, obliterated using APC  3  Mild gastritis RECOMMENDATION:  There is no recommended follow-up for this procedure  Monitor H&H and transfuse as needed  Continue Protonix IV b i d  Monitor the color of the stool  Hold anticoagulation at this time if possible  May start on clear liquid diet today     Beryle Homme, MD

## 2022-11-27 NOTE — ASSESSMENT & PLAN NOTE
Recent Labs     11/25/22  0550 11/25/22  1152 11/26/22  0438 11/26/22  1154 11/26/22  2301 11/27/22  0432 11/27/22  1146   HGB 8 0*   < > 7 3*   < > 6 4* 7 0* 6 7*   *  --  95  --   --  95  --     < > = values in this interval not displayed  Likely 2/2 acute blood loss anemia vs hemolysis  STAT H&H ordered at 1 pm - Hgb    Plan:  • Monitor CBC/H&H q 6 hrs  • Transfuse if Hb < 7  • Consent for transfusion obtained:  Yes   • Continue IV PPI BID  · Will check hemolysis labs including direct rubia test, haptoglobin, reticulocyte count, and LDH  · Will continue to hold coumadin  · Check FOBT  · GI following and planning bowel prep today and colonoscopy tomorrow

## 2022-11-27 NOTE — ASSESSMENT & PLAN NOTE
Recent Labs     11/26/22  0438 11/26/22  1154 11/27/22  0432 11/27/22  1146 11/28/22  0227 11/28/22  0511 11/28/22  1159   HGB 7 3*   < > 7 0*   < > 6 9* 6 9* 7 6*   MCV 95  --  95  --   --  96  --     < > = values in this interval not displayed  Likely 2/2 acute blood loss anemia vs hemolysis  Patient with 3 episodes of melanotic stool on 11/24/22 with drop in Hgb required 2 units of prbc total  Hemoglobin overnight was found to be 6 9  Patient was given additional PRBC transfusion  Recent bowel movements overnight were reported to be black tarry stools  Plan:  · Plan for push enteroscopy and colonoscopy today  · Patient was ordered additional bowel prep this morning and tap water enema prior to colonoscopy  • Monitor CBC/H&H q 6 hrs  • Transfuse if Hb < 7  • Consent for transfusion obtained:  Yes   • Continue IV PPI BID  · Will check hemolysis labs including direct rubia test, haptoglobin, reticulocyte count, and LDH  · Will continue to hold coumadin  · Follow-up with direct Rubia test and haptoglobin for evaluation of hemolysis

## 2022-11-27 NOTE — ASSESSMENT & PLAN NOTE
· In the setting of COPD/asthma with acute exacerbation and recent PNA   · At baseline, requires 4L NC, O2 requirement now at baseline  · S/p 5 day course abx for pneumonia    Plan  · Continue BIPAP HS and PRN for SASHA/OHS, encourage compliance  · Continue scheduled nebs and steroids for COPD exacerbation  · Continue prednisone taper today   · Encourage cough, deep breathing, IS, ambulation with assistance  · Check ABG in the morning

## 2022-11-27 NOTE — ASSESSMENT & PLAN NOTE
Lab Results   Component Value Date    EGFR 33 11/28/2022    EGFR 33 11/27/2022    EGFR 33 11/26/2022    CREATININE 1 61 (H) 11/28/2022    CREATININE 1 62 (H) 11/27/2022    CREATININE 1 62 (H) 11/26/2022     · Baseline Cr: 1 8 - 2 4  · Currently at baseline    Plan:  · Continue to monitor I/O and renal indices  · Continue PRN diuresis for goal negative 500ml/24 hours  · Avoid hypotension  · Avoid nephrotoxins  · Monitor BMP

## 2022-11-27 NOTE — ASSESSMENT & PLAN NOTE
· With acute exacerbation  · Continue scheduled xopenex, atrovent, pulmicort, perforomist    Plan  · Continue prednisone taper, currently on 40 mg  · Pending respiratory status, may add in 20 mg for three doses to taper regimen

## 2022-11-27 NOTE — ASSESSMENT & PLAN NOTE
Lab Results   Component Value Date    EGFR 33 11/27/2022    EGFR 33 11/26/2022    EGFR 36 11/25/2022    CREATININE 1 62 (H) 11/27/2022    CREATININE 1 62 (H) 11/26/2022    CREATININE 1 51 (H) 11/25/2022     · Baseline Cr: 1 8 - 2 4  · Currently at baseline    Plan:  · Continue to monitor I/O and renal indices  · Continue PRN diuresis for goal negative 500ml/24 hours   · Monitor BMP

## 2022-11-27 NOTE — PLAN OF CARE
Problem: Potential for Falls  Goal: Patient will remain free of falls  Description: INTERVENTIONS:  - Educate patient/family on patient safety including physical limitations  - Instruct patient to call for assistance with activity   - Consult OT/PT to assist with strengthening/mobility   - Keep Call bell within reach  - Keep bed low and locked with side rails adjusted as appropriate  - Keep care items and personal belongings within reach  - Initiate and maintain comfort rounds  - Make Fall Risk Sign visible to staff  - Apply yellow socks and bracelet for high fall risk patients  - Consider moving patient to room near nurses station  Outcome: Progressing     Problem: MOBILITY - ADULT  Goal: Maintain or return to baseline ADL function  Description: INTERVENTIONS:  -  Assess patient's ability to carry out ADLs; assess patient's baseline for ADL function and identify physical deficits which impact ability to perform ADLs (bathing, care of mouth/teeth, toileting, grooming, dressing, etc )  - Assess/evaluate cause of self-care deficits   - Assess range of motion  - Assess patient's mobility; develop plan if impaired  - Assess patient's need for assistive devices and provide as appropriate  - Encourage maximum independence but intervene and supervise when necessary  - Involve family in performance of ADLs  - Assess for home care needs following discharge   - Consider OT consult to assist with ADL evaluation and planning for discharge  - Provide patient education as appropriate  Outcome: Progressing  Goal: Maintains/Returns to pre admission functional level  Description: INTERVENTIONS:  - Perform BMAT or MOVE assessment daily    - Set and communicate daily mobility goal to care team and patient/family/caregiver     - Collaborate with rehabilitation services on mobility goals if consulted  - Out of bed for toileting  - Record patient progress and toleration of activity level   Outcome: Progressing     Problem: Prexisting or High Potential for Compromised Skin Integrity  Goal: Skin integrity is maintained or improved  Description: INTERVENTIONS:  - Identify patients at risk for skin breakdown  - Assess and monitor skin integrity  - Assess and monitor nutrition and hydration status  - Monitor labs   - Assess for incontinence   - Turn and reposition patient  - Assist with mobility/ambulation  - Relieve pressure over bony prominences  - Avoid friction and shearing  - Provide appropriate hygiene as needed including keeping skin clean and dry  - Evaluate need for skin moisturizer/barrier cream  - Collaborate with interdisciplinary team   - Patient/family teaching  - Consider wound care consult   Outcome: Progressing     Problem: Nutrition/Hydration-ADULT  Goal: Nutrient/Hydration intake appropriate for improving, restoring or maintaining nutritional needs  Description: Monitor and assess patient's nutrition/hydration status for malnutrition  Collaborate with interdisciplinary team and initiate plan and interventions as ordered  Monitor patient's weight and dietary intake as ordered or per policy  Utilize nutrition screening tool and intervene as necessary  Determine patient's food preferences and provide high-protein, high-caloric foods as appropriate       INTERVENTIONS:  - Monitor oral intake, urinary output, labs, and treatment plans  - Assess nutrition and hydration status and recommend course of action  - Evaluate amount of meals eaten  - Assist patient with eating if necessary   - Allow adequate time for meals  - Recommend/ encourage appropriate diets, oral nutritional supplements, and vitamin/mineral supplements  - Order, calculate, and assess calorie counts as needed  - Recommend, monitor, and adjust tube feedings and TPN/PPN based on assessed needs  - Assess need for intravenous fluids  - Provide specific nutrition/hydration education as appropriate  - Include patient/family/caregiver in decisions related to nutrition  Outcome: Progressing     Problem: PAIN - ADULT  Goal: Verbalizes/displays adequate comfort level or baseline comfort level  Description: Interventions:  - Encourage patient to monitor pain and request assistance  - Assess pain using appropriate pain scale  - Administer analgesics based on type and severity of pain and evaluate response  - Implement non-pharmacological measures as appropriate and evaluate response  - Consider cultural and social influences on pain and pain management  - Notify physician/advanced practitioner if interventions unsuccessful or patient reports new pain  Outcome: Progressing     Problem: INFECTION - ADULT  Goal: Absence or prevention of progression during hospitalization  Description: INTERVENTIONS:  - Assess and monitor for signs and symptoms of infection  - Monitor lab/diagnostic results  - Monitor all insertion sites, i e  indwelling lines, tubes, and drains  - Monitor endotracheal if appropriate and nasal secretions for changes in amount and color  - Rupert appropriate cooling/warming therapies per order  - Administer medications as ordered  - Instruct and encourage patient and family to use good hand hygiene technique  - Identify and instruct in appropriate isolation precautions for identified infection/condition  Outcome: Progressing     Problem: SAFETY ADULT  Goal: Patient will remain free of falls  Description: INTERVENTIONS:  - Educate patient/family on patient safety including physical limitations  - Instruct patient to call for assistance with activity   - Consult OT/PT to assist with strengthening/mobility   - Keep Call bell within reach  - Keep bed low and locked with side rails adjusted as appropriate  - Keep care items and personal belongings within reach  - Initiate and maintain comfort rounds  - Make Fall Risk Sign visible to staff  - Apply yellow socks and bracelet for high fall risk patients  - Consider moving patient to room near nurses station  Outcome: Progressing  Goal: Maintain or return to baseline ADL function  Description: INTERVENTIONS:  -  Assess patient's ability to carry out ADLs; assess patient's baseline for ADL function and identify physical deficits which impact ability to perform ADLs (bathing, care of mouth/teeth, toileting, grooming, dressing, etc )  - Assess/evaluate cause of self-care deficits   - Assess range of motion  - Assess patient's mobility; develop plan if impaired  - Assess patient's need for assistive devices and provide as appropriate  - Encourage maximum independence but intervene and supervise when necessary  - Involve family in performance of ADLs  - Assess for home care needs following discharge   - Consider OT consult to assist with ADL evaluation and planning for discharge  - Provide patient education as appropriate  Outcome: Progressing  Goal: Maintains/Returns to pre admission functional level  Description: INTERVENTIONS:  - Perform BMAT or MOVE assessment daily    - Set and communicate daily mobility goal to care team and patient/family/caregiver     - Collaborate with rehabilitation services on mobility goals if consulted  - Out of bed for toileting  - Record patient progress and toleration of activity level   Outcome: Progressing     Problem: DISCHARGE PLANNING  Goal: Discharge to home or other facility with appropriate resources  Description: INTERVENTIONS:  - Identify barriers to discharge w/patient and caregiver  - Arrange for needed discharge resources and transportation as appropriate  - Identify discharge learning needs (meds, wound care, etc )  - Arrange for interpretive services to assist at discharge as needed  - Refer to Case Management Department for coordinating discharge planning if the patient needs post-hospital services based on physician/advanced practitioner order or complex needs related to functional status, cognitive ability, or social support system  Outcome: Progressing     Problem: Knowledge Deficit  Goal: Patient/family/caregiver demonstrates understanding of disease process, treatment plan, medications, and discharge instructions  Description: Complete learning assessment and assess knowledge base    Interventions:  - Provide teaching at level of understanding  - Provide teaching via preferred learning methods  Outcome: Progressing     Problem: CARDIOVASCULAR - ADULT  Goal: Maintains optimal cardiac output and hemodynamic stability  Description: INTERVENTIONS:  - Monitor I/O, vital signs and rhythm  - Monitor for S/S and trends of decreased cardiac output  - Administer and titrate ordered vasoactive medications to optimize hemodynamic stability  - Assess quality of pulses, skin color and temperature  - Assess for signs of decreased coronary artery perfusion  - Instruct patient to report change in severity of symptoms  Outcome: Progressing  Goal: Absence of cardiac dysrhythmias or at baseline rhythm  Description: INTERVENTIONS:  - Continuous cardiac monitoring, vital signs, obtain 12 lead EKG if ordered  - Administer antiarrhythmic and heart rate control medications as ordered  - Monitor electrolytes and administer replacement therapy as ordered  Outcome: Progressing     Problem: RESPIRATORY - ADULT  Goal: Achieves optimal ventilation and oxygenation  Description: INTERVENTIONS:  - Assess for changes in respiratory status  - Assess for changes in mentation and behavior  - Position to facilitate oxygenation and minimize respiratory effort  - Oxygen administered by appropriate delivery if ordered  - Initiate smoking cessation education as indicated  - Encourage broncho-pulmonary hygiene including cough, deep breathe, Incentive Spirometry  - Assess the need for suctioning and aspirate as needed  - Assess and instruct to report SOB or any respiratory difficulty  - Respiratory Therapy support as indicated  Outcome: Progressing     Problem: GASTROINTESTINAL - ADULT  Goal: Maintains or returns to baseline bowel function  Description: INTERVENTIONS:  - Assess bowel function  - Encourage oral fluids to ensure adequate hydration  - Administer IV fluids if ordered to ensure adequate hydration  - Administer ordered medications as needed  - Encourage mobilization and activity  - Consider nutritional services referral to assist patient with adequate nutrition and appropriate food choices  Outcome: Progressing  Goal: Maintains adequate nutritional intake  Description: INTERVENTIONS:  - Monitor percentage of each meal consumed  - Identify factors contributing to decreased intake, treat as appropriate  - Assist with meals as needed  - Monitor I&O, weight, and lab values if indicated  - Obtain nutrition services referral as needed  Outcome: Progressing     Problem: METABOLIC, FLUID AND ELECTROLYTES - ADULT  Goal: Fluid balance maintained  Description: INTERVENTIONS:  - Monitor labs   - Monitor I/O and WT  - Instruct patient on fluid and nutrition as appropriate  - Assess for signs & symptoms of volume excess or deficit  Outcome: Progressing  Goal: Glucose maintained within target range  Description: INTERVENTIONS:  - Monitor Blood Glucose as ordered  - Assess for signs and symptoms of hyperglycemia and hypoglycemia  - Administer ordered medications to maintain glucose within target range  - Assess nutritional intake and initiate nutrition service referral as needed  Outcome: Progressing     Problem: SKIN/TISSUE INTEGRITY - ADULT  Goal: Skin Integrity remains intact(Skin Breakdown Prevention)  Description: Assess:  -Assess extremities for adequate circulation and sensation   Outcome: Progressing  Goal: Incision(s), wounds(s) or drain site(s) healing without S/S of infection  Description: INTERVENTIONS  - Assess and document dressing, incision, wound bed, drain sites and surrounding tissue  - Provide patient and family education  Outcome: Progressing       Problem: MUSCULOSKELETAL - ADULT  Goal: Maintain or return mobility to safest level of function  Description: INTERVENTIONS:  - Assess patient's ability to carry out ADLs; assess patient's baseline for ADL function and identify physical deficits which impact ability to perform ADLs (bathing, care of mouth/teeth, toileting, grooming, dressing, etc )  - Assess/evaluate cause of self-care deficits   - Assess range of motion  - Assess patient's mobility  - Assess patient's need for assistive devices and provide as appropriate  - Encourage maximum independence but intervene and supervise when necessary  - Involve family in performance of ADLs  - Assess for home care needs following discharge   - Consider OT consult to assist with ADL evaluation and planning for discharge  - Provide patient education as appropriate  Outcome: Progressing  Goal: Maintain proper alignment of affected body part  Description: INTERVENTIONS:  - Support, maintain and protect limb and body alignment  - Provide patient/ family with appropriate education  Outcome: Progressing

## 2022-11-27 NOTE — ASSESSMENT & PLAN NOTE
Lab Results   Component Value Date    HGBA1C 7 0 (H) 11/01/2022       Recent Labs     11/26/22  0747 11/26/22  1144 11/26/22  1704 11/26/22 2055   POCGLU 165* 281* 279* 233*       Blood Sugar Average: Last 72 hrs:    · ACHS glucose checks and SSI for goal glucose 140-180  · Sliding scale  · Hypoglycemic protocol

## 2022-11-27 NOTE — ASSESSMENT & PLAN NOTE
Recent Labs     11/25/22  1456 11/26/22  0438 11/27/22  0432   INR 2 08* 2 19* 1 68*     · Currently rate controlled  · Continue home metoprolol    Plan:  · Continue to hold home coumadin with supratherapeutic INR  · Monitor for signs of bleeding with BMs  · Restart Coumadin when in therapeutic range vs reversal w/ FFP/PCC as needed if bleeding and drop in Hgb recurs

## 2022-11-27 NOTE — PROGRESS NOTES
[facility]  Progress Note - Flakito Boyd 1960, 60 y o  female MRN: [id number]  Unit/Bed#: S -01 Encounter: [id number]  Primary Care Provider: Anthoney Seip, MD   Date and time admitted to hospital: 11/21/2022 12:58 PM    * Acute blood loss anemia  Assessment & Plan  Recent Labs     11/25/22  0550 11/25/22  1152 11/26/22  0438 11/26/22  1154 11/26/22  2301 11/27/22  0432 11/27/22  1146   HGB 8 0*   < > 7 3*   < > 6 4* 7 0* 6 7*   *  --  95  --   --  95  --     < > = values in this interval not displayed  Likely 2/2 acute blood loss anemia vs hemolysis  STAT H&H ordered at 1 pm - Hgb    Plan:  • Monitor CBC/H&H q 6 hrs  • Transfuse if Hb < 7  • Consent for transfusion obtained:  Yes   • Continue IV PPI BID  · Will check hemolysis labs including direct rubia test, haptoglobin, reticulocyte count, and LDH  · Will continue to hold coumadin  · Check FOBT  · GI following and planning bowel prep today and colonoscopy tomorrow      Acute on chronic respiratory failure with hypoxia and hypercapnia (HCC)  Assessment & Plan  · In the setting of COPD/asthma with acute exacerbation and recent PNA   · At baseline, requires 4L NC, O2 requirement now at baseline  · S/p 5 day course abx for pneumonia  · CT C/A/P showed multifocal scattered patchy nodular opacities in RUL and possibly RLL, was treated for pneumonia with ceftriaxone this admission    Plan  · Continue BIPAP HS and PRN for SASHA/OHS, encourage compliance  · Continue scheduled nebs and steroids for COPD exacerbation  · Continue prednisone taper  · Encourage cough, deep breathing, IS, ambulation with assistance  · Monitor oxygen requirements  · Check ABG in the morning    Panniculitis  Assessment & Plan  · 11/20 CT C/A/P: acute panniculitis, not appreciated on physical exam  · Monitor off abx    Acute metabolic encephalopathy  Assessment & Plan  · Likely in the setting of acute on chronic hypoxic and hypercapnic respiratory failure, now improving    Plan  · Continue BIPAP PRN and HS  · Regulate sleep/wake  · Delirium precautions  · Frequent neuro checks and reorientation     Bilateral pleural effusion  Assessment & Plan  · S/p diuresis with 40mg IV lasix daily    Plan  · Would continue PRN diuresis for goal -500ml over 24 hours     Pneumonia due to infectious organism  Assessment & Plan  · Diagnosed as OP  · S/p 5 days abx as OP and continued on admssion  · Patient remains afebrile, without leukocytosis, procal negative   · Monitor off abx     Leg wound, left, initial encounter  Assessment & Plan  · Wound care nurse following    Diabetes mellitus type 2 in obese [facility])  Assessment & Plan  Lab Results   Component Value Date    HGBA1C 7 0 (H) 11/01/2022       Recent Labs     11/26/22  1704 11/26/22  2055 11/27/22  0737 11/27/22  1110   POCGLU 279* 233* 164* 200*       Blood Sugar Average: Last 72 hrs:  (P) 224 6875     · ACHS glucose checks and SSI for goal glucose 140-180  · Sliding scale  · Hypoglycemic protocol    Morbid (severe) obesity due to excess calories (HCC)  Assessment & Plan  · Lifestyle modification    Anxiety  Assessment & Plan  · Continue Atarax 50 mg qHS    GERD (gastroesophageal reflux disease)  Assessment & Plan  · Continue IV Protonix 40 mg q 12 hours    Hypothyroid  Assessment & Plan  · Continue home levothyroxine    COPD with asthma ([facility] [address] )  Assessment & Plan  · With acute exacerbation  · Continue scheduled xopenex, atrovent, pulmicort, perforomist    Plan  · Continue prednisone taper, currently on 40 mg  · Pending respiratory status, may add in 20 mg for three doses to taper regimen      Paroxysmal atrial fibrillation [facility])  Assessment & Plan  Recent Labs     11/25/22  1456 11/26/22  0438 11/27/22  0432   INR 2 08* 2 19* 1 68*     · Currently rate controlled  · Continue home metoprolol  · Patient with 3 episodes of melanotic stool on 11/24/22 with drop in Hgb required 2 units of prbc total  · No additional BMs yet     Plan  · Continue to hold home coumadin with supratherapeutic INR  · Monitor for signs of bleeding with BMs  · Restart Coumadin when in therapeutic range vs reversal w/ FFP/PCC as needed if bleeding and drop in Hgb recurs      Dyslipidemia  Assessment & Plan  · Continue home statin    Chronic kidney disease, stage III (moderate) [facility])  Assessment & Plan  Lab Results   Component Value Date    EGFR 33 2022    EGFR 33 2022    EGFR 36 2022    CREATININE 1 62 (H) 2022    CREATININE 1 62 (H) 2022    CREATININE 1 51 (H) 2022     · Baseline Cr: 1 8 - 2 4  · Currently at baseline    Plan:  · Continue to monitor I/O and renal indices  · Continue PRN diuresis for goal negative 500ml/24 hours  · Avoid hypotension  · Avoid nephrotoxins  · Monitor BMP      VTE Pharmacologic Prophylaxis: VTE Score: 9 High Risk (Score >/= 5) - Pharmacological DVT Prophylaxis Contraindicated  Sequential Compression Devices Ordered  Patient Centered Rounds: I performed bedside rounds with nursing staff today  Discussions with Specialists or Other Care Team Provider: GI    Education and Discussions with Family / Patient: Updated  (significant other) via phone  Current Length of Stay: 6 day(s)  Current Patient Status: Inpatient   Discharge Plan: Anticipate discharge in 48-72 hrs to discharge location to be determined pending rehab evaluations  Code Status: Level 1 - Full Code    Subjective:   Patient seen and examined at the bedside this morning  She reports she feels better  Has not not had a bowel movement since last Friday and denies noticing any black stools or bright blood per rectum at that time  Denies lightheadedness, dizziness, fatigue, worsening shortness of breath, chest pain, and fever and chills       Objective:     Vitals:   Temp (24hrs), Av 2 °F (36 8 °C), Min:98 °F (36 7 °C), Max:98 6 °F (37 °C)    Temp:  [98 °F (36 7 °C)-98 6 °F (37 °C)] 98 6 °F (37 °C)  HR: [] 107  Resp:  [16-29] 20  BP: ()/(48-81) 125/69  SpO2:  [94 %-100 %] 97 %  Body mass index is 55 29 kg/m²  Input and Output Summary (last 24 hours): Intake/Output Summary (Last 24 hours) at 11/27/2022 1329  Last data filed at 11/27/2022 0300  Gross per 24 hour   Intake 600 ml   Output 1350 ml   Net -750 ml       Physical Exam:   Physical Exam  Vitals and nursing note reviewed  Constitutional:       General: She is not in acute distress  Appearance: She is obese  She is ill-appearing  HENT:      Head: Normocephalic and atraumatic  Mouth/Throat:      Mouth: Mucous membranes are moist    Eyes:      Pupils: Pupils are equal, round, and reactive to light  Cardiovascular:      Rate and Rhythm: Normal rate  Rhythm irregular  Pulses: Normal pulses  Heart sounds: Normal heart sounds  No murmur heard  No friction rub  No gallop  Pulmonary:      Effort: Pulmonary effort is normal  No respiratory distress  Breath sounds: No wheezing, rhonchi or rales  Comments: Limited d/t body habitus  Diminished    Abdominal:      General: Bowel sounds are normal  There is no distension  Palpations: Abdomen is soft  Tenderness: There is no abdominal tenderness  Musculoskeletal:         General: Swelling present  Normal range of motion  Cervical back: Neck supple  Right lower leg: Edema present  Left lower leg: Edema present  Skin:     General: Skin is warm and dry  Capillary Refill: Capillary refill takes less than 2 seconds  Neurological:      General: No focal deficit present  Mental Status: She is alert and oriented to person, place, and time  Cranial Nerves: No cranial nerve deficit  Sensory: No sensory deficit  Motor: No weakness            Additional Data:     Labs:  Results from last 7 days   Lab Units 11/27/22  1146 11/27/22  0432 11/26/22  1154 11/26/22  0438 11/25/22  1152 11/25/22  0550 11/24/22  0550 11/23/22  0356 WBC Thousand/uL  --  15 82*  --  11 48*  --  13 09*   < > 8 23   HEMOGLOBIN g/dL 6 7* 7 0*   < > 7 3*   < > 8 0*   < > 8 6*   HEMATOCRIT %  --  22 7*  --  24 1*  --  26 9*   < > 30 8*   PLATELETS Thousands/uL  --  179  --  177  --  223   < > 236   BANDS PCT %  --   --   --   --   --  6  --   --    NEUTROS PCT %  --   --   --   --   --   --   --  80*   LYMPHS PCT %  --   --   --   --   --   --   --  9*   LYMPHO PCT %  --   --   --  8*  --  17   < >  --    MONOS PCT %  --   --   --   --   --   --   --  8   MONO PCT %  --   --   --  14*  --  7   < >  --    EOS PCT %  --   --   --  0  --  2   < > 0    < > = values in this interval not displayed  Results from last 7 days   Lab Units 11/27/22  0432 11/24/22  0550 11/23/22  0356   SODIUM mmol/L 139   < > 139   POTASSIUM mmol/L 5 1   < > 4 6   CHLORIDE mmol/L 98   < > 97   CO2 mmol/L 38*   < > 37*   BUN mg/dL 114*   < > 38*   CREATININE mg/dL 1 62*   < > 1 69*   ANION GAP mmol/L 3*   < > 5   CALCIUM mg/dL 9 1   < > 9 2   ALBUMIN g/dL  --   --  3 1*   TOTAL BILIRUBIN mg/dL  --   --  0 28   ALK PHOS U/L  --   --  118*   ALT U/L  --   --  9   AST U/L  --   --  15   GLUCOSE RANDOM mg/dL 219*   < > 161*    < > = values in this interval not displayed       Results from last 7 days   Lab Units 11/27/22  0432   INR  1 68*     Results from last 7 days   Lab Units 11/27/22  1110 11/27/22  0737 11/26/22  2055 11/26/22  1704 11/26/22  1144 11/26/22  0747 11/25/22  2114 11/25/22  1735 11/25/22  1643 11/25/22  1113 11/25/22  0813 11/24/22  2217   POC GLUCOSE mg/dl 200* 164* 233* 279* 281* 165* 235* 243* 230* 208* 186* 258*         Results from last 7 days   Lab Units 11/22/22  1147 11/21/22  0507 11/20/22  1432   LACTIC ACID mmol/L 1 9  --  0 5   PROCALCITONIN ng/ml  --  0 16 0 18       Lines/Drains:  Invasive Devices     Peripheral Intravenous Line  Duration           Peripheral IV 11/24/22 Right;Ventral (anterior) Forearm 3 days    Peripheral IV 11/24/22 Distal;Left;Upper;Ventral (anterior) Arm 2 days    Peripheral IV 11/25/22 Proximal;Right;Upper;Ventral (anterior) Arm 2 days    Long-Dwell Peripheral IV (Midline) 83/94/28 Left Cephalic 1 day                  Telemetry:  Telemetry Orders (From admission, onward)             24 Hour Telemetry Monitoring  Continuous x 24 Hours (Telem)        Expiring   References:    Telemetry Guidelines   Question:  Reason for 24 Hour Telemetry  Answer:  Metabolic/Electrolyte Disturbance with High Probability of Dysrhythmia (K level <3 or >6, or KCL infusion >=10mEq/hr)                 Telemetry Reviewed: Atrial fibrillation  HR averaging 90  Indication for Continued Telemetry Use: Arrthymias requiring medical therapy             Imaging: Reviewed radiology reports from this admission including: chest xray, chest CT scan and CT head    Recent Cultures (last 7 days):   Results from last 7 days   Lab Units 11/20/22  1528 11/20/22  1441 11/20/22  1432   BLOOD CULTURE   --  No Growth After 5 Days  No Growth After 5 Days     URINE CULTURE  No Growth <1000 cfu/mL  --   --    LEGIONELLA URINARY ANTIGEN  Negative  --   --        Last 24 Hours Medication List:   Current Facility-Administered Medications   Medication Dose Route Frequency Provider Last Rate   • acetaminophen  650 mg Oral Q6H PRN Berenice Liang MD     • atorvastatin  10 mg Oral Daily Berenice Liang MD     • budesonide  0 5 mg Nebulization Q12H Berenice Liang MD     • collagenase   Topical Daily Berenice Liang MD     • formoterol  20 mcg Nebulization Q12H Berenice Liang MD     • guaiFENesin  1,200 mg Oral BID Berenice Liang MD     • hydrOXYzine HCL  50 mg Oral HS Berenice Liang MD     • insulin lispro  2-12 Units Subcutaneous TID AC Berenice Liang MD     • insulin lispro  2-12 Units Subcutaneous HS Berenice Liang MD     • ipratropium  0 5 mg Nebulization TID Berenice Liang MD     • levalbuterol  1 25 mg Nebulization TID Berenice Nelson Nate Coreas MD     • levothyroxine  100 mcg Oral Early Morning Berenice Milan MD     • melatonin  6 mg Oral HS Berenice Milan MD     • metoprolol tartrate  50 mg Oral BID Berenice Milan MD     • ondansetron  4 mg Intravenous Q8H PRN Berenice Milan MD     • pantoprazole  40 mg Intravenous Q12H [address] Berenice Milan MD     • polyethylene glycol  238 g Oral Once Susi Trujillo PA-C     • polyethylene glycol  17 g Oral Daily Berenice Milan MD     • [START ON 11/28/2022] predniSONE  30 mg Oral Daily Berenice Milan MD      Followed by   • [START ON 12/1/2022] predniSONE  10 mg Oral Daily Berenice Milan MD     • senna  1 tablet Oral BID Berenice Milan MD          Today, Patient Was Seen By: [facility], DO    **Please Note: This note may have been constructed using a voice recognition system  **

## 2022-11-28 ENCOUNTER — ANESTHESIA (INPATIENT)
Dept: GASTROENTEROLOGY | Facility: HOSPITAL | Age: 62
End: 2022-11-28

## 2022-11-28 ENCOUNTER — ANESTHESIA EVENT (INPATIENT)
Dept: GASTROENTEROLOGY | Facility: HOSPITAL | Age: 62
End: 2022-11-28

## 2022-11-28 ENCOUNTER — APPOINTMENT (INPATIENT)
Dept: GASTROENTEROLOGY | Facility: HOSPITAL | Age: 62
End: 2022-11-28

## 2022-11-28 LAB
ABO GROUP BLD BPU: NORMAL
ANION GAP SERPL CALCULATED.3IONS-SCNC: 4 MMOL/L (ref 4–13)
BASE EX.OXY STD BLDV CALC-SCNC: 91.7 % (ref 60–80)
BASE EXCESS BLDV CALC-SCNC: 12.7 MMOL/L
BPU ID: NORMAL
BUN SERPL-MCNC: 105 MG/DL (ref 5–25)
CALCIUM SERPL-MCNC: 9 MG/DL (ref 8.4–10.2)
CHLORIDE SERPL-SCNC: 97 MMOL/L (ref 96–108)
CO2 SERPL-SCNC: 39 MMOL/L (ref 21–32)
CREAT SERPL-MCNC: 1.61 MG/DL (ref 0.6–1.3)
CROSSMATCH: NORMAL
ERYTHROCYTE [DISTWIDTH] IN BLOOD BY AUTOMATED COUNT: 20.7 % (ref 11.6–15.1)
GFR SERPL CREATININE-BSD FRML MDRD: 33 ML/MIN/1.73SQ M
GLUCOSE SERPL-MCNC: 165 MG/DL (ref 65–140)
GLUCOSE SERPL-MCNC: 178 MG/DL (ref 65–140)
GLUCOSE SERPL-MCNC: 193 MG/DL (ref 65–140)
GLUCOSE SERPL-MCNC: 198 MG/DL (ref 65–140)
GLUCOSE SERPL-MCNC: 231 MG/DL (ref 65–140)
HCO3 BLDV-SCNC: 39.4 MMOL/L (ref 24–30)
HCT VFR BLD AUTO: 21.9 % (ref 34.8–46.1)
HEMOCCULT STL QL: POSITIVE
HEMOCCULT STL QL: POSITIVE
HGB BLD-MCNC: 6.9 G/DL (ref 11.5–15.4)
HGB BLD-MCNC: 6.9 G/DL (ref 11.5–15.4)
HGB BLD-MCNC: 7 G/DL (ref 11.5–15.4)
HGB BLD-MCNC: 7.6 G/DL (ref 11.5–15.4)
INR PPP: 1.65 (ref 0.84–1.19)
MCH RBC QN AUTO: 30.3 PG (ref 26.8–34.3)
MCHC RBC AUTO-ENTMCNC: 31.5 G/DL (ref 31.4–37.4)
MCV RBC AUTO: 96 FL (ref 82–98)
O2 CT BLDV-SCNC: 10.4 ML/DL
PCO2 BLDV: 67.2 MM HG (ref 42–50)
PH BLDV: 7.39 [PH] (ref 7.3–7.4)
PLATELET # BLD AUTO: 176 THOUSANDS/UL (ref 149–390)
PMV BLD AUTO: 11 FL (ref 8.9–12.7)
PO2 BLDV: 76.7 MM HG (ref 35–45)
POTASSIUM SERPL-SCNC: 5.1 MMOL/L (ref 3.5–5.3)
PROTHROMBIN TIME: 19.8 SECONDS (ref 11.6–14.5)
RBC # BLD AUTO: 2.28 MILLION/UL (ref 3.81–5.12)
SODIUM SERPL-SCNC: 140 MMOL/L (ref 135–147)
UNIT DISPENSE STATUS: NORMAL
UNIT PRODUCT CODE: NORMAL
UNIT PRODUCT VOLUME: 350 ML
UNIT RH: NORMAL
WBC # BLD AUTO: 15.47 THOUSAND/UL (ref 4.31–10.16)

## 2022-11-28 PROCEDURE — 0DJD8ZZ INSPECTION OF LOWER INTESTINAL TRACT, VIA NATURAL OR ARTIFICIAL OPENING ENDOSCOPIC: ICD-10-PCS | Performed by: STUDENT IN AN ORGANIZED HEALTH CARE EDUCATION/TRAINING PROGRAM

## 2022-11-28 RX ORDER — PROPOFOL 10 MG/ML
INJECTION, EMULSION INTRAVENOUS AS NEEDED
Status: DISCONTINUED | OUTPATIENT
Start: 2022-11-28 | End: 2022-11-28

## 2022-11-28 RX ORDER — MIDAZOLAM HYDROCHLORIDE 2 MG/2ML
INJECTION, SOLUTION INTRAMUSCULAR; INTRAVENOUS AS NEEDED
Status: DISCONTINUED | OUTPATIENT
Start: 2022-11-28 | End: 2022-11-28

## 2022-11-28 RX ORDER — POLYETHYLENE GLYCOL 3350 17 G/17G
119 POWDER, FOR SOLUTION ORAL ONCE
Status: COMPLETED | OUTPATIENT
Start: 2022-11-28 | End: 2022-11-28

## 2022-11-28 RX ORDER — SODIUM CHLORIDE, SODIUM LACTATE, POTASSIUM CHLORIDE, CALCIUM CHLORIDE 600; 310; 30; 20 MG/100ML; MG/100ML; MG/100ML; MG/100ML
INJECTION, SOLUTION INTRAVENOUS CONTINUOUS PRN
Status: DISCONTINUED | OUTPATIENT
Start: 2022-11-28 | End: 2022-11-28

## 2022-11-28 RX ADMIN — GUAIFENESIN 1200 MG: 600 TABLET, EXTENDED RELEASE ORAL at 17:23

## 2022-11-28 RX ADMIN — PROPOFOL 60 MG: 10 INJECTION, EMULSION INTRAVENOUS at 13:57

## 2022-11-28 RX ADMIN — FORMOTEROL FUMARATE DIHYDRATE 20 MCG: 20 SOLUTION RESPIRATORY (INHALATION) at 07:15

## 2022-11-28 RX ADMIN — POLYETHYLENE GLYCOL 3350 17 G: 17 POWDER, FOR SOLUTION ORAL at 08:24

## 2022-11-28 RX ADMIN — FORMOTEROL FUMARATE DIHYDRATE 20 MCG: 20 SOLUTION RESPIRATORY (INHALATION) at 19:49

## 2022-11-28 RX ADMIN — METOPROLOL TARTRATE 50 MG: 50 TABLET, FILM COATED ORAL at 08:24

## 2022-11-28 RX ADMIN — PROPOFOL 50 MG: 10 INJECTION, EMULSION INTRAVENOUS at 14:03

## 2022-11-28 RX ADMIN — INSULIN LISPRO 2 UNITS: 100 INJECTION, SOLUTION INTRAVENOUS; SUBCUTANEOUS at 21:49

## 2022-11-28 RX ADMIN — BUDESONIDE 0.5 MG: 0.5 INHALANT ORAL at 07:15

## 2022-11-28 RX ADMIN — PROPOFOL 50 MG: 10 INJECTION, EMULSION INTRAVENOUS at 14:33

## 2022-11-28 RX ADMIN — PANTOPRAZOLE SODIUM 40 MG: 40 INJECTION, POWDER, FOR SOLUTION INTRAVENOUS at 08:24

## 2022-11-28 RX ADMIN — PROPOFOL 40 MG: 10 INJECTION, EMULSION INTRAVENOUS at 13:59

## 2022-11-28 RX ADMIN — BUDESONIDE 0.5 MG: 0.5 INHALANT ORAL at 19:49

## 2022-11-28 RX ADMIN — Medication 20 MG: at 13:57

## 2022-11-28 RX ADMIN — IPRATROPIUM BROMIDE 0.5 MG: 0.5 SOLUTION RESPIRATORY (INHALATION) at 07:15

## 2022-11-28 RX ADMIN — Medication 10 MG: at 14:12

## 2022-11-28 RX ADMIN — COLLAGENASE SANTYL: 250 OINTMENT TOPICAL at 08:25

## 2022-11-28 RX ADMIN — PROPOFOL 50 MG: 10 INJECTION, EMULSION INTRAVENOUS at 14:08

## 2022-11-28 RX ADMIN — Medication 10 MG: at 14:01

## 2022-11-28 RX ADMIN — Medication 10 MG: at 14:05

## 2022-11-28 RX ADMIN — INSULIN LISPRO 4 UNITS: 100 INJECTION, SOLUTION INTRAVENOUS; SUBCUTANEOUS at 17:24

## 2022-11-28 RX ADMIN — Medication 40 MG: at 14:03

## 2022-11-28 RX ADMIN — PANTOPRAZOLE SODIUM 40 MG: 40 INJECTION, POWDER, FOR SOLUTION INTRAVENOUS at 21:49

## 2022-11-28 RX ADMIN — LEVALBUTEROL HYDROCHLORIDE 1.25 MG: 1.25 SOLUTION, CONCENTRATE RESPIRATORY (INHALATION) at 07:15

## 2022-11-28 RX ADMIN — PROPOFOL 50 MG: 10 INJECTION, EMULSION INTRAVENOUS at 14:23

## 2022-11-28 RX ADMIN — Medication 6 MG: at 21:49

## 2022-11-28 RX ADMIN — MIDAZOLAM 2 MG: 1 INJECTION INTRAMUSCULAR; INTRAVENOUS at 13:55

## 2022-11-28 RX ADMIN — INSULIN LISPRO 2 UNITS: 100 INJECTION, SOLUTION INTRAVENOUS; SUBCUTANEOUS at 11:59

## 2022-11-28 RX ADMIN — HYDROXYZINE HYDROCHLORIDE 50 MG: 50 TABLET, FILM COATED ORAL at 21:49

## 2022-11-28 RX ADMIN — PROPOFOL 50 MG: 10 INJECTION, EMULSION INTRAVENOUS at 14:11

## 2022-11-28 RX ADMIN — GUAIFENESIN 1200 MG: 600 TABLET, EXTENDED RELEASE ORAL at 08:24

## 2022-11-28 RX ADMIN — LEVOTHYROXINE SODIUM 100 MCG: 100 TABLET ORAL at 05:05

## 2022-11-28 RX ADMIN — SENNOSIDES 8.6 MG: 8.6 TABLET, FILM COATED ORAL at 08:24

## 2022-11-28 RX ADMIN — ATORVASTATIN CALCIUM 10 MG: 10 TABLET, FILM COATED ORAL at 08:24

## 2022-11-28 RX ADMIN — PREDNISONE 30 MG: 20 TABLET ORAL at 08:24

## 2022-11-28 RX ADMIN — PROPOFOL 50 MG: 10 INJECTION, EMULSION INTRAVENOUS at 14:16

## 2022-11-28 RX ADMIN — POLYETHYLENE GLYCOL 3350 119 G: 17 POWDER, FOR SOLUTION ORAL at 10:17

## 2022-11-28 RX ADMIN — INSULIN LISPRO 2 UNITS: 100 INJECTION, SOLUTION INTRAVENOUS; SUBCUTANEOUS at 08:25

## 2022-11-28 RX ADMIN — LEVALBUTEROL HYDROCHLORIDE 1.25 MG: 1.25 SOLUTION, CONCENTRATE RESPIRATORY (INHALATION) at 19:49

## 2022-11-28 RX ADMIN — IPRATROPIUM BROMIDE 0.5 MG: 0.5 SOLUTION RESPIRATORY (INHALATION) at 19:49

## 2022-11-28 RX ADMIN — SODIUM CHLORIDE, SODIUM LACTATE, POTASSIUM CHLORIDE, AND CALCIUM CHLORIDE: .6; .31; .03; .02 INJECTION, SOLUTION INTRAVENOUS at 13:39

## 2022-11-28 NOTE — PROGRESS NOTES
Progress Note - Palliative & Supportive Care  Xavier Payan  60 y o   female  S /S -01   MRN: [id number]  Encounter: [id number]     Assessment  Acute on chronic respiratory failure  Acute metabolic encephalopathy  Pneumonia  Bilateral pleural effusion  COPD  Asthma  CKDIII  Morbid obesity  Anxiety  Goals of care counseling  Palliative care encounter    Goals of Care  Level 1 Full Code - code status  Patient and Faye Tanner DO NOT want to discuss short term rehab or nursing home placement any further  Please do not bring up as they are getting frustrated with this conversation  Patient desires to continue all disease-directed therapies without limits  Decisional apparatus:  Patient appears competent on exam today and appeared competent on neuropsych evaluation on 11/25/22  If competency is lost, patient's substitute decision maker would default to 2 sisters by [place], however patient has completed POA paperwork and advanced directives that name significant other Jesu Loredo as her healthcare agent  Advance Directive/Living Will: Reviewed  Request full care with no limits  POLST: Reviewed  Request full care  POA Forms: Reviewed  Name significant other, Jesu Loredo, as HCA and only decision maker  Plan  Symptom Management  -Per primary team at this time  Atarax is prescribed at night prior to BiPAP  Delirium Risk  -Recommend global delirium precautions including:   -Establishment of day/night cycle via lights during the day and blinds open   Please limit interruptions at night as medically appropriate   -Provide glasses/hearing aids as apprioriate     -Minimize deliriogenic meds as able  -Provide reorientation including date on board and visible clock    -Avoid restraints as able, frequent verbal reorientations or patient care sitter as appropriate  Social support:  -Patient lives at home with significant other Faye Hart  She is well-supported by her   -Patient has 2 sisters   Sister, Melissa Ahmadi, lives in [place] and they communicate intermittently and sister, Garrick Samaniego, lives in [place] and patient has not spoken to her in 20 years        24 Hour History  Chart reviewed before visit  Patient is lying in bed during visit with significant other [facility] bedside  Discussed atarax at night prior to BiPAP treatment and patient states that it has helped "a little " She was able to wear the BiPAP for 5 hours two nights ago and 2 hours last night  Provided encouragement on this  She still feels claustrophobic when wearing the mask and watches the clock while wearing it  She understands the importance of wearing BiPAP at night  Significant other, [facility], is encouraging to patient to wear BiPAP  She states she still has shortness of breath today, but is able to easily engage in conversation  The patient and [facility] reiterate their desire for her to return to home and do not wish to discuss short term rehab or nursing home placement any further  They have had negative experiences in the past when family members went to these facilities and adamantly refuse to discuss this anymore  They also express desire for Dayanna Lester to continue to receive all disease-directed therapies  They do not have any questions or concerns today  Further discussed living situation with Dayanna Lester and [facility]  They use [employer] for transportation to appointments  This requires them to be aware of appointments at least 3 days in advance and to know how long the appointment will take so that they can arrange pickup time  The pulmonologist office is "19 blocks away" and they plan to use [employer]'s motorized scooter and walk to the appointments  [facility] is Adelina's primary caretaker and has support from Adelina's sister as well if needed, however she is not local     Spoke to [facility] while Dayanna Lester was away for procedure (colonoscopy and endoscopy)  She states she is doing better than last week   She has arranged transportation for herself to come visit Ronn Ardon daily while she is in the hospital  She is encouraged to continue to care for herself as well  She is eating and sleeping better  Discussed healthy eating alternatives upon Adelina's discharge  Review of Systems   All other systems reviewed and are negative        Medications    Current Facility-Administered Medications:   •  acetaminophen (TYLENOL) tablet 650 mg, 650 mg, Oral, Q6H PRN, Berenice Blair MD  •  atorvastatin (LIPITOR) tablet 10 mg, 10 mg, Oral, Daily, Berenice Blair MD, 10 mg at 11/28/22 0824  •  budesonide (PULMICORT) inhalation solution 0 5 mg, 0 5 mg, Nebulization, Q12H, Berenice Blair MD, 0 5 mg at 11/28/22 0715  •  collagenase (SANTYL) ointment, , Topical, Daily, Berenice Blair MD, Given at 11/28/22 0825  •  formoterol (PERFOROMIST) nebulizer solution 20 mcg, 20 mcg, Nebulization, Q12H, Berenice Blair MD, 20 mcg at 11/28/22 0715  •  guaiFENesin (MUCINEX) 12 hr tablet 1,200 mg, 1,200 mg, Oral, BID, Berenice Blair MD, 1,200 mg at 11/28/22 6316  •  hydrOXYzine HCL (ATARAX) tablet 50 mg, 50 mg, Oral, HS, Berenice Blair MD, 50 mg at 11/27/22 2155  •  insulin lispro (HumaLOG) 100 units/mL subcutaneous injection 2-12 Units, 2-12 Units, Subcutaneous, TID AC, 2 Units at 11/28/22 0825 **AND** Fingerstick Glucose (POCT), , , TID AC, Berenice Blair MD  •  insulin lispro (HumaLOG) 100 units/mL subcutaneous injection 2-12 Units, 2-12 Units, Subcutaneous, HS, Berenice Blair MD, 6 Units at 11/27/22 2156  •  ipratropium (ATROVENT) 0 02 % inhalation solution 0 5 mg, 0 5 mg, Nebulization, TID, Berenice Blair MD, 0 5 mg at 11/28/22 0715  •  levalbuterol (Omie Lango) inhalation solution 1 25 mg, 1 25 mg, Nebulization, TID, Berenice Blair MD, 1 25 mg at 11/28/22 0715  •  levothyroxine tablet 100 mcg, 100 mcg, Oral, Early Morning, Berenice Blair MD, 100 mcg at 11/28/22 0505  •  melatonin tablet 6 mg, 6 mg, Oral, HS, Berenice Elisha Petersen MD, 6 mg at 11/27/22 2155  •  metoprolol tartrate (LOPRESSOR) tablet 50 mg, 50 mg, Oral, BID, Berenice Petersen MD, 50 mg at 11/28/22 0824  •  [facility]etron [facility]) injection 4 mg, 4 mg, Intravenous, Q8H PRN, Berenice Petersen MD, 4 mg at 11/24/22 0137  •  pantoprazole (PROTONIX) injection 40 mg, 40 mg, Intravenous, Q12H [facility], Berenice Petersen MD, 40 mg at 11/28/22 8981  •  polyethylene glycol (MIRALAX) packet 17 g, 17 g, Oral, Daily, Berenice Petersen MD, 17 g at 11/28/22 8977  •  [COMPLETED] predniSONE tablet 40 mg, 40 mg, Oral, Daily, 40 mg at 11/27/22 0946 **FOLLOWED BY** predniSONE tablet 30 mg, 30 mg, Oral, Daily, 30 mg at 11/28/22 0824 **FOLLOWED BY** [START ON 12/1/2022] predniSONE tablet 10 mg, 10 mg, Oral, Daily, Berenice Petersen MD  •  senna (SENOKOT) tablet 8 6 mg, 1 tablet, Oral, BID, Berenice Petersen MD, 8 6 mg at 11/28/22 0824    Objective  /64   Pulse 84   Temp (!) 97 4 °F (36 3 °C)   Resp 16   Ht 5' 4" (1 626 m)   Wt (!) 146 kg (322 lb 1 5 oz)   SpO2 99%   BMI 55 29 kg/m²   Physical Exam:   Constitutional: Appears chronically ill  Obese  In no acute distress  Head: Normocephalic and atraumatic  Eyes: EOM are normal  No ocular discharge  No scleral icterus  Neck: no visible adenopathy or masses  Cardiac: Normal rate   Respiratory: Effort normal  No stridor  No respiratory distress  No cough  No O2 requirements  Gastrointestinal: No abdominal distension  Musculoskeletal: BLE and left hand edema  Neurological: Alert, oriented and appropriately conversant  Skin: Dry, no diaphoresis  Psychiatric: Flat affect  Behavior, judgement and thought content appear normal      Lab Results: I have personally reviewed pertinent labs  Imaging Studies: I have personally reviewed pertinent reports  EKG, Pathology, and Other Studies: I have personally reviewed pertinent reports      Counseling / Coordination of Care  Total floor / unit time spent today 40 minutes  Greater than 50% of total time was spent with the patient and / or family counseling and / or coordinating of care  A description of the counseling / coordination of care: Chart reviewed, provided medical updates, determined goals of care, determined competency and POA/HCA, determined social/family support, provided psychosocial support  Glenys Wayne PA-C  Palliative & Supportive Care    Portions of this document may have been created using dictation software and as such some "sound alike" terms may have been generated by the system  Do not hesitate to contact me with any questions or clarifications

## 2022-11-28 NOTE — ANESTHESIA PREPROCEDURE EVALUATION
Procedure:  COLONOSCOPY  EGD WITH PUSH ENTEROSCOPY    Relevant Problems   CARDIO   (+) Paroxysmal atrial fibrillation (HCC)      ENDO   (+) Diabetes mellitus type 2 in obese (HCC)   (+) Hyperparathyroidism (HCC)   (+) Hypothyroid   (+) Secondary hyperparathyroidism of renal origin (HCC)      GI/HEPATIC   (+) GERD (gastroesophageal reflux disease)      /RENAL   (+) Acute renal failure superimposed on chronic kidney disease (HCC)   (+) Anemia due to stage 3b chronic kidney disease (HCC)   (+) Chronic kidney disease, stage III (moderate) (HCC)   (+) Diabetic nephropathy associated with type 2 diabetes mellitus (HCC)   (+) Hypertensive chronic kidney disease with stage 1 through stage 4 chronic kidney disease, or unspecified chronic kidney disease      GYN   (+) History of hysterectomy      HEMATOLOGY   (+) Acute blood loss anemia   (+) Anemia due to stage 3b chronic kidney disease (HCC)      NEURO/PSYCH   (+) Anxiety   (+) Depression, recurrent (HCC)   (+) Diabetic neuropathy (HCC)   (+) Diabetic polyneuropathy associated with type 2 diabetes mellitus (HCC)      PULMONARY   (+) Acute on chronic respiratory failure with hypoxia and hypercapnia (HCC)   (+) Bilateral pleural effusion   (+) COPD with asthma (HCC)   (+) Pneumonia due to infectious organism   (+) SOB (shortness of breath)      Other   (+) Dyslipidemia   (+) H/O right nephrectomy   (+) Morbid (severe) obesity due to excess calories (HCC)        Physical Exam    Airway    Mallampati score: IV  TM Distance: <3 FB       Dental       Cardiovascular  Cardiovascular exam normal    Pulmonary  Pulmonary exam normal     Other Findings        Anesthesia Plan  ASA Score- 4     Anesthesia Type- IV sedation with anesthesia with ASA Monitors  Additional Monitors:   Airway Plan:     Comment: Pt uses 4LNC at home and wheel chair bound   Plan Factors-Exercise tolerance (METS): <4 METS  Chart reviewed  EKG reviewed  Existing labs reviewed   Patient summary reviewed  Patient is not a current smoker  Patient not instructed to abstain from smoking on day of procedure  Patient did not smoke on day of surgery  Induction-     Postoperative Plan-     Informed Consent- Anesthetic plan and risks discussed with patient  I personally reviewed this patient with the CRNA  Discussed and agreed on the Anesthesia Plan with the CRNA             Lab Results   Component Value Date    HGBA1C 7 0 (H) 11/01/2022       Lab Results   Component Value Date     09/01/2017    K 5 1 11/28/2022    CL 97 11/28/2022    CO2 39 (H) 11/28/2022     (H) 11/28/2022    CREATININE 1 61 (H) 11/28/2022    GLUCOSE 172 (H) 09/09/2020    GLUF 124 (H) 08/08/2021    CALCIUM 9 0 11/28/2022    CORRECTEDCA 9 9 11/23/2022    AST 15 11/23/2022    ALT 9 11/23/2022    ALKPHOS 118 (H) 11/23/2022    PROT 7 0 09/01/2017    BILITOT 0 4 09/01/2017    EGFR 33 11/28/2022       Lab Results   Component Value Date    WBC 15 47 (H) 11/28/2022    HGB 7 6 (L) 11/28/2022    HCT 21 9 (L) 11/28/2022    MCV 96 11/28/2022     11/28/2022   Possible Atrial fibrillation with rapid ventricular response  Low voltage QRS  Poor R wave progression  Abnormal ECG  Confirmed by Miguelito Man ([id number]) on 11/22/2022 4:01:02 PM    Nov 2022 echo   •  Study Details: Study quality was poor  This was a technically difficult study due to decreased penetration and poor acoustic windows  •  Left Ventricle: Left ventricular cavity size is normal  Wall thickness is mildly increased  There is concentric remodeling  Wall motion and left ventricular ejection fraction cannot be accurately assessed  Left ventricular systolic function appears normal in limited views  •  Right Ventricle: Right ventricular cavity size is dilated  Systolic function is normal  Wall thickness is increased  •  Left Atrium: The atrium is dilated  •  Right Atrium: The atrium is dilated  •  Tricuspid Valve: There is mild regurgitation   The estimated right ventricular systolic pressure at least moderately elevated    •  Pericardium: There is no pericardial effusion

## 2022-11-28 NOTE — PHYSICAL THERAPY NOTE
PHYSICAL THERAPY CANCELLATION NOTE    Patient Name: Flakito SIMS Date: 11/28/2022 11/28/22 8757   Note Type   Note type Cancelled Session   Cancel Reasons Patient off floor/test   Additional Comments Chart review performed  Attempted to see pt for PT intervention, however pt is currently off floor at [facility]  PT will continue to follow and perform interventions during this admission     María Soliz, PT, DPT     DCR from [address] [place] [address] on 11/23: rehab vs home w/ HHPT pending level of A family can provide   Of note, pt and SO Shivani Hunt are currently declining STR anyway in conversations w/ CM

## 2022-11-28 NOTE — INTERVAL H&P NOTE
H&P reviewed  After examining the patient I find no changes in the patients condition since the H&P had been written  Vitals:    11/28/22 1314   BP: 103/53   Pulse: 86   Resp: 20   Temp: (!) 97 4 °F (36 3 °C)   SpO2: 97%     Informed consent was obtained for the procedure  Risks of infection, perforation and hemorrhage were discussed  The patient was agreeable to proceed with the procedure

## 2022-11-28 NOTE — CASE MANAGEMENT
Case Management Progress Note    Patient name Lio Quinonez  Location S /S -01 MRN [id number]  : 1960 Date 2022       LOS (days): 7  Geometric Mean LOS (GMLOS) (days): 3 50  Days to GMLOS:-3 6        OBJECTIVE:        Current admission status: Inpatient  Preferred Pharmacy:   [facility] #[id number] Kenny Deutsch, [address] [place] [address],[address]  [facility] [address]  Phone: [phone] Fax: [phone]    Primary Care Provider: Lana Harris MD    Primary Insurance: MEDICARE  Secondary Insurance: [address] [place] [address]    PROGRESS NOTE:    CM met patient's significant other, Jayce Weston at bedside as patient is currently down having a procedure  As per Jayce Weston, discharge plan remains that will return home with [facility]  Patient and Jayce Weston are declining rehab  CM will continue to follow up with patient's needs

## 2022-11-28 NOTE — PROGRESS NOTES
Progress Note - Pulmonary   Erum Bonilla 60 y o  female MRN: [id number]  Unit/Bed#: S -01 Encounter: [id number]    Assessment/Plan:    Acute on chronic hypoxic hypercapnic respiratory failure, multifactorial due to below              Baseline oxygen requirement is 4 liters via nasal cannula  Titrate oxygen to maintain saturations greater than or equal to 89%  Activity as tolerated  Continue BiPAP as listed below      Possible COPD of unknown severity with acute exacerbation              Continue prednisone taper as previously delineated  Continue Pulmicort/Perforomist twice daily with Xopenex/Atrovent 3 times daily  Outpatient pulmonary follow-up and PFTs pending course      Acute blood loss anemia with known gastric and duodenal AVMs              Continue to follow H&H per primary team               Gastroenterology is following  Given continued drop in hemoglobin, there is consideration for colonoscopy and prep is initiated      Restrictive lung disease due to morbid obesity and elevated hemidiaphragm with SASHA/obesity hypoventilation              Continue BiPAP 14/6 at HS  Discussed with primary team and nursing  Chief Complaint:    “I'm cold ”    Subjective:    Leon Mcghee is sitting up in bed getting a breathing treatment  She reports she is feeling a bit cold today  She did undergo bowel prep yesterday but is still having tarry bowel movements  She is currently getting a blood transfusion  Breathing is stable compared to yesterday  Objective:    Vitals: Blood pressure 124/64, pulse 84, temperature (!) 97 4 °F (36 3 °C), resp  rate 16, height 5' 4" (1 626 m), weight (!) 146 kg (322 lb 1 5 oz), SpO2 99 %, not currently breastfeeding  3L NC,Body mass index is 55 29 kg/m²        Intake/Output Summary (Last 24 hours) at 11/28/2022 0816  Last data filed at 11/28/2022 0014  Gross per 24 hour   Intake 350 ml   Output 1311 ml   Net -961 ml       Invasive Devices     Peripheral Intravenous Line  Duration           Peripheral IV 11/25/22 Proximal;Right;Upper;Ventral (anterior) Arm 2 days    Long-Dwell Peripheral IV (Midline) 11/75/51 Left Cephalic 1 day                Physical Exam:     Physical Exam  Vitals reviewed  Constitutional:       General: She is not in acute distress  Appearance: She is well-developed and well-nourished  She is morbidly obese  She is not toxic-appearing or diaphoretic  Interventions: Nasal cannula and face mask in place  HENT:      Head: Normocephalic and atraumatic  Eyes:      General: No scleral icterus  Extraocular Movements: EOM normal    Neck:      Trachea: No tracheal deviation  Cardiovascular:      Rate and Rhythm: Normal rate and regular rhythm  Heart sounds: S1 normal and S2 normal  No murmur heard  No friction rub  No gallop  Pulmonary:      Effort: Pulmonary effort is normal  No tachypnea, accessory muscle usage or respiratory distress  Breath sounds: No stridor  Wheezing present  No decreased breath sounds, rhonchi or rales  Chest:      Chest wall: No tenderness  Abdominal:      General: Bowel sounds are normal  There is no distension  Palpations: Abdomen is soft  Tenderness: There is no abdominal tenderness  Musculoskeletal:         General: No edema  Cervical back: Neck supple  Skin:     General: Skin is warm and dry  Findings: No rash  Nails: There is no cyanosis  Neurological:      Mental Status: She is alert and oriented to person, place, and time  GCS: GCS eye subscore is 4  GCS verbal subscore is 5  GCS motor subscore is 6  Motor: Motor strength is normal    Psychiatric:         Mood and Affect: Mood and affect normal          Speech: Speech normal          Behavior: Behavior normal  Behavior is cooperative         Labs:   CBC:   Lab Results   Component Value Date    WBC 15 47 (H) 11/28/2022    HGB 6 9 (LL) 11/28/2022    HCT 21 9 (L) 11/28/2022    MCV 96 11/28/2022     11/28/2022    MCH 30 3 11/28/2022    MCHC 31 5 11/28/2022    RDW 20 7 (H) 11/28/2022    MPV 11 0 11/28/2022   , CMP:   Lab Results   Component Value Date    SODIUM 140 11/28/2022    K 5 1 11/28/2022    CL 97 11/28/2022    CO2 39 (H) 11/28/2022     (H) 11/28/2022    CREATININE 1 61 (H) 11/28/2022    CALCIUM 9 0 11/28/2022    EGFR 33 11/28/2022   , PT/INR:   Lab Results   Component Value Date    INR 1 65 (H) 11/28/2022     Imaging and other studies: None today

## 2022-11-28 NOTE — PROGRESS NOTES
[facility]  Progress Note - Anupama Perez 1960, 60 y o  female MRN: [id number]  Unit/Bed#: S -01 Encounter: [id number]  Primary Care Provider: Judah Robert MD   Date and time admitted to hospital: 11/21/2022 12:58 PM    * Acute blood loss anemia  Assessment & Plan  Recent Labs     11/26/22  0438 11/26/22  1154 11/27/22  0432 11/27/22  1146 11/28/22  0227 11/28/22  0511 11/28/22  1159   HGB 7 3*   < > 7 0*   < > 6 9* 6 9* 7 6*   MCV 95  --  95  --   --  96  --     < > = values in this interval not displayed  Likely 2/2 acute blood loss anemia vs hemolysis  Patient with 3 episodes of melanotic stool on 11/24/22 with drop in Hgb required 2 units of prbc total  Hemoglobin overnight was found to be 6 9  Patient was given additional PRBC transfusion  Recent bowel movements overnight were reported to be black tarry stools  Plan:  · Plan for push enteroscopy and colonoscopy today  · Patient was ordered additional bowel prep this morning and tap water enema prior to colonoscopy  • Monitor CBC/H&H q 6 hrs  • Transfuse if Hb < 7  • Consent for transfusion obtained:  Yes   • Continue IV PPI BID  · Will check hemolysis labs including direct rubia test, haptoglobin, reticulocyte count, and LDH  · Will continue to hold coumadin  · Follow-up with direct Rubia test and haptoglobin for evaluation of hemolysis        Acute on chronic respiratory failure with hypoxia and hypercapnia (HCC)  Assessment & Plan  · In the setting of COPD/asthma with acute exacerbation and recent PNA   · At baseline, requires 4L NC, O2 requirement now at baseline  · S/p 5 day course abx for pneumonia  · CT C/A/P showed multifocal scattered patchy nodular opacities in RUL and possibly RLL, was treated for pneumonia with ceftriaxone this admission  · VBG on 11/28 showed a respiratory acidosis with appropriate compensation     Plan  · Continue BIPAP HS and PRN for SASHA/OHS, encourage compliance  · Continue scheduled nebs and steroids for COPD exacerbation  · Continue prednisone taper  · Encourage cough, deep breathing, IS, ambulation with assistance  · Monitor oxygen requirements    Panniculitis  Assessment & Plan  · 11/20 CT C/A/P: acute panniculitis, not appreciated on physical exam  · Monitor off abx    Acute metabolic encephalopathy  Assessment & Plan  · Likely in the setting of acute on chronic hypoxic and hypercapnic respiratory failure, now improving    Plan  · Continue BIPAP PRN and HS  · Regulate sleep/wake  · Delirium precautions  · Frequent neuro checks and reorientation     Bilateral pleural effusion  Assessment & Plan  · S/p diuresis with 40mg IV lasix daily    Plan  · Would continue PRN diuresis for goal -500ml over 24 hours     Pneumonia due to infectious organism  Assessment & Plan  · Diagnosed as OP  · S/p 5 days abx as OP and continued on admssion  · Patient remains afebrile, without leukocytosis, procal negative   · Monitor off abx     Leg wound, left, initial encounter  Assessment & Plan  · Wound care nurse following    Diabetes mellitus type 2 in obese [facility])  Assessment & Plan  Lab Results   Component Value Date    HGBA1C 7 0 (H) 11/01/2022       Recent Labs     11/26/22  1704 11/26/22  2055 11/27/22  0737 11/27/22  1110   POCGLU 279* 233* 164* 200*       Blood Sugar Average: Last 72 hrs:  (P) 224 6875     · ACHS glucose checks and SSI for goal glucose 140-180  · Sliding scale  · Hypoglycemic protocol    Morbid (severe) obesity due to excess calories (HCC)  Assessment & Plan  · Lifestyle modification    Anxiety  Assessment & Plan  · Continue Atarax 50 mg qHS    GERD (gastroesophageal reflux disease)  Assessment & Plan  · Continue IV Protonix 40 mg q 12 hours    Hypothyroid  Assessment & Plan  · Continue home levothyroxine    COPD with asthma ([facility] [address] )  Assessment & Plan  · With acute exacerbation  · Continue scheduled xopenex, atrovent, pulmicort, perforomist    Plan  · Continue prednisone taper, currently on 40 mg  · Pending respiratory status, may add in 20 mg for three doses to taper regimen      Paroxysmal atrial fibrillation [facility])  Assessment & Plan  Recent Labs     11/25/22  1456 11/26/22  0438 11/27/22  0432   INR 2 08* 2 19* 1 68*     · Currently rate controlled  · Continue home metoprolol    Plan:  · Continue to hold home coumadin with supratherapeutic INR  · Monitor for signs of bleeding with BMs  · Restart Coumadin when in therapeutic range vs reversal w/ FFP/PCC as needed if bleeding and drop in Hgb recurs        Dyslipidemia  Assessment & Plan  · Continue home statin    Chronic kidney disease, stage III (moderate) [facility])  Assessment & Plan  Lab Results   Component Value Date    EGFR 33 11/28/2022    EGFR 33 11/27/2022    EGFR 33 11/26/2022    CREATININE 1 61 (H) 11/28/2022    CREATININE 1 62 (H) 11/27/2022    CREATININE 1 62 (H) 11/26/2022     · Baseline Cr: 1 8 - 2 4  · Currently at baseline    Plan:  · Continue to monitor I/O and renal indices  · Continue PRN diuresis for goal negative 500ml/24 hours  · Avoid hypotension  · Avoid nephrotoxins  · Monitor BMP      VTE Pharmacologic Prophylaxis: VTE Score: 9 High Risk (Score >/= 5) - Pharmacological DVT Prophylaxis Contraindicated  Sequential Compression Devices Ordered  Patient Centered Rounds: I performed bedside rounds with nursing staff today  Discussions with Specialists or Other Care Team Provider: GI    Education and Discussions with Family / Patient: Updated  (significant other) at bedside  Current Length of Stay: 7 day(s)  Current Patient Status: Inpatient   Discharge Plan: Anticipate discharge in 48-72 hrs to rehab facility  Code Status: Level 1 - Full Code    Subjective:   Patient seen and examined at the bedside this morning  Patient reports no acute complaints and states that she has not been experiencing any worsening shortness of breath, fatigue, lightheadedness, and dizziness   She noticed overnight that her stools have continued to look black and tarry, as reported by nursing staff  She understands the plan for push enteroscopy and colonoscopy today and stated that she had finished her bowel prep yesterday  She continues to tolerate bipap at night  Objective:     Vitals:   Temp (24hrs), Av °F (36 7 °C), Min:97 4 °F (36 3 °C), Max:98 7 °F (37 1 °C)    Temp:  [97 4 °F (36 3 °C)-98 7 °F (37 1 °C)] 97 4 °F (36 3 °C)  HR:  [] 86  Resp:  [16-20] 20  BP: ()/(53-74) 103/53  SpO2:  [95 %-100 %] 97 %  Body mass index is 55 27 kg/m²  Input and Output Summary (last 24 hours): Intake/Output Summary (Last 24 hours) at 2022 1350  Last data filed at 2022 1337  Gross per 24 hour   Intake 716 67 ml   Output 1117 ml   Net -400 33 ml       Physical Exam:   Physical Exam  Vitals and nursing note reviewed  Constitutional:       General: She is not in acute distress  Appearance: She is obese  She is ill-appearing  HENT:      Head: Normocephalic and atraumatic  Mouth/Throat:      Mouth: Mucous membranes are moist    Eyes:      Pupils: Pupils are equal, round, and reactive to light  Cardiovascular:      Rate and Rhythm: Normal rate  Rhythm irregular  Pulses: Normal pulses  Heart sounds: Normal heart sounds  No murmur heard  No friction rub  No gallop  Pulmonary:      Effort: Pulmonary effort is normal  No respiratory distress  Breath sounds: No wheezing, rhonchi or rales  Comments: Limited d/t body habitus  Diminished    Abdominal:      General: Bowel sounds are normal  There is no distension  Palpations: Abdomen is soft  Tenderness: There is no abdominal tenderness  Musculoskeletal:         General: Swelling present  Normal range of motion  Cervical back: Neck supple  Right lower leg: Edema present  Left lower leg: Edema present  Skin:     General: Skin is warm and dry  Capillary Refill: Capillary refill takes less than 2 seconds  Neurological:      General: No focal deficit present  Mental Status: She is alert and oriented to person, place, and time  Cranial Nerves: No cranial nerve deficit  Sensory: No sensory deficit  Motor: No weakness  Additional Data:     Labs:  Results from last 7 days   Lab Units 11/28/22  1159 11/28/22  0511 11/26/22  1154 11/26/22  0438 11/25/22  1152 11/25/22  0550 11/24/22  0550 11/23/22  0356   WBC Thousand/uL  --  15 47*   < > 11 48*  --  13 09*   < > 8 23   HEMOGLOBIN g/dL 7 6* 6 9*   < > 7 3*   < > 8 0*   < > 8 6*   HEMATOCRIT %  --  21 9*   < > 24 1*  --  26 9*   < > 30 8*   PLATELETS Thousands/uL  --  176   < > 177  --  223   < > 236   BANDS PCT %  --   --   --   --   --  6  --   --    NEUTROS PCT %  --   --   --   --   --   --   --  80*   LYMPHS PCT %  --   --   --   --   --   --   --  9*   LYMPHO PCT %  --   --   --  8*  --  17   < >  --    MONOS PCT %  --   --   --   --   --   --   --  8   MONO PCT %  --   --   --  14*  --  7   < >  --    EOS PCT %  --   --   --  0  --  2   < > 0    < > = values in this interval not displayed  Results from last 7 days   Lab Units 11/28/22  0511 11/24/22  0550 11/23/22  0356   SODIUM mmol/L 140   < > 139   POTASSIUM mmol/L 5 1   < > 4 6   CHLORIDE mmol/L 97   < > 97   CO2 mmol/L 39*   < > 37*   BUN mg/dL 105*   < > 38*   CREATININE mg/dL 1 61*   < > 1 69*   ANION GAP mmol/L 4   < > 5   CALCIUM mg/dL 9 0   < > 9 2   ALBUMIN g/dL  --   --  3 1*   TOTAL BILIRUBIN mg/dL  --   --  0 28   ALK PHOS U/L  --   --  118*   ALT U/L  --   --  9   AST U/L  --   --  15   GLUCOSE RANDOM mg/dL 193*   < > 161*    < > = values in this interval not displayed       Results from last 7 days   Lab Units 11/28/22  0511   INR  1 65*     Results from last 7 days   Lab Units 11/28/22  1123 11/28/22  0642 11/27/22  2107 11/27/22  1539 11/27/22  1110 11/27/22  0737 11/26/22  2055 11/26/22  1704 11/26/22  1144 11/26/22  0747 11/25/22  2114 11/25/22  1735   POC GLUCOSE mg/dl 178* 165* 291* 227* 200* 164* 233* 279* 281* 165* 235* 243*         Results from last 7 days   Lab Units 11/22/22  1147   LACTIC ACID mmol/L 1 9       Lines/Drains:  Invasive Devices     Peripheral Intravenous Line  Duration           Peripheral IV 11/25/22 Proximal;Right;Upper;Ventral (anterior) Arm 3 days    Long-Dwell Peripheral IV (Midline) 14/18/39 Left Cephalic 2 days                  Telemetry:  Telemetry Orders (From admission, onward)             48 Hour Telemetry Monitoring  Continuous x 48 hours        References:    Telemetry Guidelines   Question:  Reason for 48 Hour Telemetry  Answer:  Arrhythmias Requiring Medical Therapy (eg  SVT, Vtach/fib, Bradycardia, Uncontrolled A-fib)                 Telemetry Reviewed: Normal Sinus Rhythm and Atrial fibrillation   HR averaging 85  Indication for Continued Telemetry Use: Arrthymias requiring medical therapy             Imaging: Reviewed radiology reports from this admission including: chest xray, chest CT scan and CT head    Recent Cultures (last 7 days):         Last 24 Hours Medication List:   Current Facility-Administered Medications   Medication Dose Route Frequency Provider Last Rate   • acetaminophen  650 mg Oral Q6H PRN Berenice Ragland MD     • atorvastatin  10 mg Oral Daily Berenice Ragland MD     • budesonide  0 5 mg Nebulization Q12H Berenice Ragland MD     • collagenase   Topical Daily Berenice Ragland MD     • formoterol  20 mcg Nebulization Q12H Berenice Ragland MD     • guaiFENesin  1,200 mg Oral BID Berenice Ragland MD     • hydrOXYzine HCL  50 mg Oral HS Berenice Ragland MD     • insulin lispro  2-12 Units Subcutaneous TID AC Berenice Ragland MD     • insulin lispro  2-12 Units Subcutaneous HS Berenice Ragland MD     • ipratropium  0 5 mg Nebulization TID Berenice Ragland MD     • levalbuterol  1 25 mg Nebulization TID Berenice Ragland MD     • levothyroxine  100 mcg Oral Early Morning Berenice Johnston MD     • melatonin  6 mg Oral HS Berenice Johnston MD     • metoprolol tartrate  50 mg Oral BID Berenice Johnston MD     • ondansetron  4 mg Intravenous Q8H PRN Berenice Johnston MD     • pantoprazole  40 mg Intravenous Q12H [address] Berenice Johnston MD     • polyethylene glycol  17 g Oral Daily Berenice Johnston MD     • predniSONE  30 mg Oral Daily Berneice Johnston MD      Followed by   • [START ON 12/1/2022] predniSONE  10 mg Oral Daily Berenice Johnston MD     • senna  1 tablet Oral BID Berenice Johnston MD          Today, Patient Was Seen By: Travis Tsang DO    **Please Note: This note may have been constructed using a voice recognition system  **

## 2022-11-29 LAB
ABO GROUP BLD BPU: NORMAL
ABO GROUP BLD BPU: NORMAL
ANION GAP SERPL CALCULATED.3IONS-SCNC: 4 MMOL/L (ref 4–13)
BPU ID: NORMAL
BPU ID: NORMAL
BUN SERPL-MCNC: 86 MG/DL (ref 5–25)
CALCIUM SERPL-MCNC: 8.8 MG/DL (ref 8.4–10.2)
CHLORIDE SERPL-SCNC: 100 MMOL/L (ref 96–108)
CO2 SERPL-SCNC: 38 MMOL/L (ref 21–32)
CREAT SERPL-MCNC: 1.56 MG/DL (ref 0.6–1.3)
CROSSMATCH: NORMAL
CROSSMATCH: NORMAL
GFR SERPL CREATININE-BSD FRML MDRD: 35 ML/MIN/1.73SQ M
GLUCOSE SERPL-MCNC: 119 MG/DL (ref 65–140)
GLUCOSE SERPL-MCNC: 126 MG/DL (ref 65–140)
GLUCOSE SERPL-MCNC: 143 MG/DL (ref 65–140)
GLUCOSE SERPL-MCNC: 161 MG/DL (ref 65–140)
GLUCOSE SERPL-MCNC: 279 MG/DL (ref 65–140)
HGB BLD-MCNC: 6.9 G/DL (ref 11.5–15.4)
HGB BLD-MCNC: 7.8 G/DL (ref 11.5–15.4)
HGB BLD-MCNC: 8 G/DL (ref 11.5–15.4)
HGB BLD-MCNC: 8.2 G/DL (ref 11.5–15.4)
POTASSIUM SERPL-SCNC: 4.7 MMOL/L (ref 3.5–5.3)
SODIUM SERPL-SCNC: 142 MMOL/L (ref 135–147)
UNIT DISPENSE STATUS: NORMAL
UNIT DISPENSE STATUS: NORMAL
UNIT PRODUCT CODE: NORMAL
UNIT PRODUCT CODE: NORMAL
UNIT PRODUCT VOLUME: 300 ML
UNIT PRODUCT VOLUME: 350 ML
UNIT RH: NORMAL
UNIT RH: NORMAL

## 2022-11-29 RX ORDER — PREDNISONE 10 MG/1
10 TABLET ORAL DAILY
Status: DISCONTINUED | OUTPATIENT
Start: 2022-12-01 | End: 2022-12-03 | Stop reason: HOSPADM

## 2022-11-29 RX ORDER — PREDNISONE 20 MG/1
20 TABLET ORAL DAILY
Status: COMPLETED | OUTPATIENT
Start: 2022-11-30 | End: 2022-11-30

## 2022-11-29 RX ORDER — POLYETHYLENE GLYCOL 3350 17 G/17G
238 POWDER, FOR SOLUTION ORAL ONCE
Status: COMPLETED | OUTPATIENT
Start: 2022-11-29 | End: 2022-11-29

## 2022-11-29 RX ADMIN — LEVOTHYROXINE SODIUM 100 MCG: 100 TABLET ORAL at 05:56

## 2022-11-29 RX ADMIN — HYDROXYZINE HYDROCHLORIDE 50 MG: 50 TABLET, FILM COATED ORAL at 21:01

## 2022-11-29 RX ADMIN — BUDESONIDE 0.5 MG: 0.5 INHALANT ORAL at 07:30

## 2022-11-29 RX ADMIN — METOPROLOL TARTRATE 50 MG: 50 TABLET, FILM COATED ORAL at 21:01

## 2022-11-29 RX ADMIN — PREDNISONE 30 MG: 20 TABLET ORAL at 08:18

## 2022-11-29 RX ADMIN — PANTOPRAZOLE SODIUM 40 MG: 40 INJECTION, POWDER, FOR SOLUTION INTRAVENOUS at 08:19

## 2022-11-29 RX ADMIN — INSULIN LISPRO 2 UNITS: 100 INJECTION, SOLUTION INTRAVENOUS; SUBCUTANEOUS at 12:59

## 2022-11-29 RX ADMIN — Medication 6 MG: at 21:01

## 2022-11-29 RX ADMIN — LEVALBUTEROL HYDROCHLORIDE 1.25 MG: 1.25 SOLUTION, CONCENTRATE RESPIRATORY (INHALATION) at 13:38

## 2022-11-29 RX ADMIN — PANTOPRAZOLE SODIUM 40 MG: 40 INJECTION, POWDER, FOR SOLUTION INTRAVENOUS at 21:01

## 2022-11-29 RX ADMIN — GUAIFENESIN 1200 MG: 600 TABLET, EXTENDED RELEASE ORAL at 08:18

## 2022-11-29 RX ADMIN — FORMOTEROL FUMARATE DIHYDRATE 20 MCG: 20 SOLUTION RESPIRATORY (INHALATION) at 20:30

## 2022-11-29 RX ADMIN — SENNOSIDES 8.6 MG: 8.6 TABLET, FILM COATED ORAL at 08:18

## 2022-11-29 RX ADMIN — POLYETHYLENE GLYCOL 3350 238 G: 17 POWDER, FOR SOLUTION ORAL at 17:11

## 2022-11-29 RX ADMIN — LEVALBUTEROL HYDROCHLORIDE 1.25 MG: 1.25 SOLUTION, CONCENTRATE RESPIRATORY (INHALATION) at 20:30

## 2022-11-29 RX ADMIN — IPRATROPIUM BROMIDE 0.5 MG: 0.5 SOLUTION RESPIRATORY (INHALATION) at 20:30

## 2022-11-29 RX ADMIN — COLLAGENASE SANTYL: 250 OINTMENT TOPICAL at 06:11

## 2022-11-29 RX ADMIN — BUDESONIDE 0.5 MG: 0.5 INHALANT ORAL at 20:30

## 2022-11-29 RX ADMIN — ATORVASTATIN CALCIUM 10 MG: 10 TABLET, FILM COATED ORAL at 08:18

## 2022-11-29 RX ADMIN — GUAIFENESIN 1200 MG: 600 TABLET, EXTENDED RELEASE ORAL at 17:11

## 2022-11-29 RX ADMIN — POLYETHYLENE GLYCOL 3350 17 G: 17 POWDER, FOR SOLUTION ORAL at 08:19

## 2022-11-29 RX ADMIN — IPRATROPIUM BROMIDE 0.5 MG: 0.5 SOLUTION RESPIRATORY (INHALATION) at 13:38

## 2022-11-29 RX ADMIN — FORMOTEROL FUMARATE DIHYDRATE 20 MCG: 20 SOLUTION RESPIRATORY (INHALATION) at 07:30

## 2022-11-29 RX ADMIN — INSULIN LISPRO 6 UNITS: 100 INJECTION, SOLUTION INTRAVENOUS; SUBCUTANEOUS at 17:11

## 2022-11-29 RX ADMIN — METOPROLOL TARTRATE 50 MG: 50 TABLET, FILM COATED ORAL at 08:18

## 2022-11-29 RX ADMIN — SENNOSIDES 8.6 MG: 8.6 TABLET, FILM COATED ORAL at 17:11

## 2022-11-29 RX ADMIN — LEVALBUTEROL HYDROCHLORIDE 1.25 MG: 1.25 SOLUTION, CONCENTRATE RESPIRATORY (INHALATION) at 07:29

## 2022-11-29 RX ADMIN — IPRATROPIUM BROMIDE 0.5 MG: 0.5 SOLUTION RESPIRATORY (INHALATION) at 07:30

## 2022-11-29 NOTE — ASSESSMENT & PLAN NOTE
Lab Results   Component Value Date    HGBA1C 7 0 (H) 11/01/2022       Recent Labs     11/28/22  2110 11/29/22  0714 11/29/22  1050 11/29/22  1609   POCGLU 198* 119 161* 279*       Blood Sugar Average: Last 72 hrs:  (P) 211 4     · ACHS glucose checks and SSI for goal glucose 140-180  · Sliding scale  · Hypoglycemic protocol

## 2022-11-29 NOTE — OCCUPATIONAL THERAPY NOTE
Occupational Therapy Progress Note     Patient Name: Zeinab Nickerson  [id number] Date: 11/29/2022  Problem List  Principal Problem:    Acute blood loss anemia  Active Problems:    Chronic kidney disease, stage III (moderate) (HCC)    Dyslipidemia    Paroxysmal atrial fibrillation (HCC)    COPD with asthma (HCC)    Hypothyroid    GERD (gastroesophageal reflux disease)    Anxiety    Morbid (severe) obesity due to excess calories ([facility] )    Diabetes mellitus type 2 in obese ([facility] )    Leg wound, left, initial encounter    Pneumonia due to infectious organism    Bilateral pleural effusion    Acute metabolic encephalopathy    Panniculitis    Acute on chronic respiratory failure with hypoxia and hypercapnia ([facility] )       11/29/22 1142   OT Last Visit   OT Visit Date 11/29/22  (Tuesday)   Note Type   Note Type Treatment   Pain Assessment   Pain Assessment Tool FLACC   Pain Location/Orientation Location: Generalized   Effect of Pain on Daily Activities limits activity and sitting tolerance at EOB   Patient's Stated Pain Goal No pain   Hospital Pain Intervention(s) Repositioned; Ambulation/increased activity; Emotional support   Pain Rating: FLACC (Rest) - Face 1   Pain Rating: FLACC (Rest) - Legs 0   Pain Rating: FLACC (Rest) - Activity 0   Pain Rating: FLACC (Rest) - Cry 0   Pain Rating: FLACC (Rest) - Consolability 0   Score: FLACC (Rest) 1   Pain Rating: FLACC (Activity) - Face 1   Pain Rating: FLACC (Activity) - Legs 0   Pain Rating: FLACC (Activity) - Activity 0   Pain Rating: FLACC (Activity) - Cry 2   Pain Rating: FLACC (Activity) - Consolability 1   Score: FLACC (Activity) 4   Restrictions/Precautions   Weight Bearing Precautions Per Order No   Other Precautions Cognitive; Bed Alarm; Chair Alarm; Impulsive;Telemetry;O2;Fall Risk;Pain  (2L O2 via NC)   Lifestyle   Reciprocal Relationships Supportive signifncant other, Constanza assist w/ ADL/ IADL   Service to Others 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   Intrinsic Gratification Pt reports enjoying watching tv and when able going to see the lights in [place] Lala Mock)   ADL   Where Assessed Edge of bed   Eating Assistance 6  Modified independent   Eating Deficit Setup   Grooming Assistance 3  Moderate Assistance   Grooming Deficit Setup; Increased time to complete;Brushing hair   Grooming Comments seated at EOB  Pt reports Papi Austin assist at baseline due to limited shoulder ROM   UB Bathing Assistance 4  Minimal Assistance   UB Bathing Deficit Setup; Increased time to complete   UB Bathing Comments seated at EOB  Able to was face, abdomen, chest seated unsupported after set- up  Required A to wash her back   LB Bathing Assistance 2  Maximal Assistance   LB Bathing Deficit Setup;Verbal cueing;Supervision/safety; Increased time to complete;Right lower leg including foot; Left lower leg including foot   LB Bathing Comments seated at EOB   UB Dressing Assistance 4  Minimal Assistance   UB Dressing Deficit Setup;Pull around back; Fasteners; Increased time to complete;Verbal cueing;Supervision/safety   UB Dressing Comments seated at EOB   LB Dressing Assistance Unable to assess  (pt declined slipper socks)   LB Dressing Comments Pt stated that she slides in socks   Bed Mobility   Rolling L 5  Supervision   Additional items Assist x 1;Bedrails; Increased time required   Supine to Sit 4  Minimal assistance   Additional items Assist x 1; Increased time required;Verbal cues; Bedrails   Sit to Supine 5  Supervision   Additional items Assist x 1; Increased time required;Verbal cues   Additional Comments Tolerated sitting at EOB approx 15 minutes  O2 sats >90% throughout on 2L O2  Pt required A x2 to reposition towards [facility] w/ bed in trendelenburg  Pt supine HOB elevated at end of session w/ needs met, call bell in reach and bed alarm activated     Transfers   Sit to Stand Unable to assess   Stand to Sit Unable to assess   Stand pivot Unable to assess   Additional Comments Pt declined despite encouragement / education  Functional Mobility   Additional Comments NT   Cognition   Overall Cognitive Status Impaired   Arousal/Participation Alert; Cooperative   Attention Attends with cues to redirect   Orientation Level Oriented to person;Oriented to place;Oriented to situation   Memory Decreased recall of recent events  (details, timeline)   Following Commands Follows one step commands with increased time or repetition   Comments Identified pt by full name and birthdate  Flat affect and appeared irritable  Pt reports that she wanst to know what is going on and why her O2 was turned down  Pt reports 3-4L O2 chronically  Demonstrated questionable insight into deficits  Activity Tolerance   Activity Tolerance Patient limited by fatigue   Medical Staff Made Aware spoke w/ RNAlva pre and post tx session  Care coordination w/ Ángel LESTER at end of session and messageoly SILVA via [facility]ILDREN Text  Assessment   Assessment Pt seen for skilled OT Tx session day 1 from [id number] focusing on activity engagement and challenging activity / sitting tolerance  Pt agreeable to participate in session, and tolerated sitting at EOB unuspported approx 15 minutes  Pt required less physical assistance to complete bed mobility  Pt engaged in bathing and dressing seated at EOB on 2L O2 w/ O2 sats >90% throughout session  Continue to recommend post acute rehab when medically stable for discharge from acute care (vs Home w/ HHOT) pend level of support at PLOF, functional transfers / mobility w/ S, and medical optimization  Per CM note, pt is declining rehab  Will continue to follow   Plan   Treatment Interventions ADL retraining;Functional transfer training;UE strengthening/ROM; Endurance training;Patient/family training;Equipment evaluation/education;Continued evaluation; Energy conservation; Activityengagement   Goal Expiration Date 12/07/22   OT Treatment Day 1   OT Frequency 2-3x/wk   Recommendation   OT Discharge Recommendation Post acute rehabilitation services   AM-[facility] Daily Activity Inpatient   Lower Body Dressing 1   Bathing 2   Toileting 2   Upper Body Dressing 3   Grooming 3   Eating 3   Daily Activity Raw Score 14   Daily Activity Standardized Score (Calc for Raw Score >=11) 33 39   AM-PAC Applied Cognition Inpatient   Following a Speech/Presentation 3   Understanding Ordinary Conversation 3   Taking Medications 2   Remembering Where Things Are Placed or Put Away 3   Remembering List of 4-5 Errands 2   Taking Care of Complicated Tasks 2   Applied Cognition Raw Score 15   Applied Cognition Standardized Score 33 54   Barthel Index   Feeding 5   Bathing 0   Grooming Score 0   Dressing Score 5   Bladder Score 5   Bowels Score 10   Toilet Use Score 0   Transfers (Bed/Chair) Score 0   Mobility (Level Surface) Score 0   Stairs Score 0   Barthel Index Score 25   Modified [place] Scale   Modified Reji Scale 4      The patient's raw score on the AM-PAC Daily Activity inpatient short form is 14, standardized score is 33 39, less than 39 4  Patients at this level are likely to benefit from discharge to post-acute rehabilitation services  Please refer to the recommendation of the Occupational Therapist for safe discharge planning        Birgit Canchola OTR/L

## 2022-11-29 NOTE — PROGRESS NOTES
Progress Note - Pulmonary   Elfrieda Colon 60 y o  female MRN: [id number]  Unit/Bed#: S -01 Encounter: [id number]    Assessment/Plan:    Acute on chronic hypoxic hypercapnic respiratory failure, multifactorial due to below              Baseline oxygen requirement is 4 liters via nasal cannula               Titrate oxygen to maintain saturations greater than or equal to 89%              Activity as tolerated               Continue BiPAP as listed below      Possible COPD of unknown severity with acute exacerbation              Continue prednisone taper as previously delineated  Given continued intermittent wheezing, would not deescalate this sooner               Continue Pulmicort/Perforomist twice daily with Xopenex/Atrovent 3 times daily              Outpatient pulmonary follow-up and PFTs pending course      Acute blood loss anemia with known gastric and duodenal AVMs              Continue to follow H&H per primary team               Gastroenterology is following               Status post push enteroscopy and colonoscopy yesterday  There is consideration for repeat colonoscopy with extended prep pending course      Restrictive lung disease due to morbid obesity and elevated hemidiaphragm with SASHA/obesity hypoventilation              Continue BiPAP 14/6 at HS  We did discuss BiPAP qualification and she is agreeable to using BiPAP in the home setting  Qualification will need to be done prior to discharge  Outpatient pulmonary follow-up pending course  Chief Complaint:    Lea Quinteros could not finish the procedure ”    Subjective:    Cira Boyd is sitting up in bed eating breakfast   She reports frustration today because her procedure was incomplete due to insufficient prep  She reports she still has chest congestion and occasional cough  She denies chest pain  She does have a slight sore throat after her procedure yesterday      Objective:    Vitals: Blood pressure 107/61, pulse 83, temperature (!) 97 4 °F (36 3 °C), resp  rate 16, height 5' 4" (1 626 m), weight (!) 148 kg (325 lb 2 9 oz), SpO2 94 %, not currently breastfeeding  2L NC,Body mass index is 55 82 kg/m²  Intake/Output Summary (Last 24 hours) at 11/29/2022 0952  Last data filed at 11/29/2022 0336  Gross per 24 hour   Intake 635 ml   Output 150 ml   Net 485 ml       Invasive Devices     Peripheral Intravenous Line  Duration           Long-Dwell Peripheral IV (Midline) 90/76/23 Left Cephalic 3 days    Peripheral IV 11/25/22 Proximal;Right;Upper;Ventral (anterior) Arm 3 days                Physical Exam:     Physical Exam  Vitals reviewed  Constitutional:       General: She is not in acute distress  Appearance: She is well-developed and well-nourished  She is morbidly obese  She is not toxic-appearing or diaphoretic  Interventions: Nasal cannula in place  HENT:      Head: Normocephalic and atraumatic  Eyes:      General: No scleral icterus  Extraocular Movements: EOM normal    Neck:      Trachea: No tracheal deviation  Cardiovascular:      Rate and Rhythm: Normal rate and regular rhythm  Heart sounds: S1 normal and S2 normal  No murmur heard  No friction rub  No gallop  Pulmonary:      Effort: Pulmonary effort is normal  No tachypnea, accessory muscle usage or respiratory distress  Breath sounds: No stridor  Decreased breath sounds and wheezing present  No rhonchi or rales  Chest:      Chest wall: No tenderness  Abdominal:      General: Bowel sounds are normal  There is no distension  Palpations: Abdomen is soft  Tenderness: There is no abdominal tenderness  Musculoskeletal:      Cervical back: Neck supple  Right lower leg: Edema present  Left lower leg: Edema present  Skin:     General: Skin is warm and dry  Findings: No rash  Nails: There is no cyanosis  Neurological:      Mental Status: She is alert and oriented to person, place, and time        GCS: GCS eye subscore is 4  GCS verbal subscore is 5  GCS motor subscore is 6  Motor: Motor strength is normal    Psychiatric:         Mood and Affect: Mood and affect normal          Speech: Speech normal          Behavior: Behavior normal  Behavior is cooperative           Labs:   CBC:   Lab Results   Component Value Date    HGB 8 0 (L) 11/29/2022   , CMP:   Lab Results   Component Value Date    SODIUM 142 11/29/2022    K 4 7 11/29/2022     11/29/2022    CO2 38 (H) 11/29/2022    BUN 86 (H) 11/29/2022    CREATININE 1 56 (H) 11/29/2022    CALCIUM 8 8 11/29/2022    EGFR 35 11/29/2022       Imaging and other studies: None today

## 2022-11-29 NOTE — ASSESSMENT & PLAN NOTE
· With acute exacerbation  · Continue scheduled xopenex, atrovent, pulmicort, perforomist    Plan  · Continue prednisone taper, currently on 30 mg  · Continue to monitor oxygen saturations  · Will continue to follow with Pulmonary recommendations

## 2022-11-29 NOTE — PLAN OF CARE
Problem: OCCUPATIONAL THERAPY ADULT  Goal: Performs self-care activities at highest level of function for planned discharge setting  See evaluation for individualized goals  Description: Treatment Interventions: ADL retraining, Functional transfer training, Endurance training, Cognitive reorientation, Patient/family training, Equipment evaluation/education, Compensatory technique education, Energy conservation, Activityengagement          See flowsheet documentation for full assessment, interventions and recommendations  Outcome: Progressing  Note: Limitation: Decreased ADL status, Decreased UE strength, Decreased Safe judgement during ADL, Decreased endurance, Decreased self-care trans, Decreased high-level ADLs, Decreased cognition  Prognosis: Fair  Assessment: Pt seen for skilled OT Tx session day 1 from 2006-1367 focusing on activity engagement and challenging activity / sitting tolerance  Pt agreeable to participate in session, and tolerated sitting at EOB unuspported approx 15 minutes  Pt required less physical assistance to complete bed mobility  Pt engaged in bathing and dressing seated at EOB on 2L O2 w/ O2 sats >90% throughout session  Continue to recommend post acute rehab when medically stable for discharge from acute care (vs Home w/ HHOT) pend level of support at PLOF, functional transfers / mobility w/ S, and medical optimization  Per CM note, pt is declining rehab   Will continue to follow     OT Discharge Recommendation: Post acute rehabilitation services

## 2022-11-29 NOTE — PLAN OF CARE
Problem: PHYSICAL THERAPY ADULT  Goal: Performs mobility at highest level of function for planned discharge setting  See evaluation for individualized goals  Description: Treatment/Interventions: Functional transfer training, LE strengthening/ROM, Therapeutic exercise, Endurance training, Cognitive reorientation, Patient/family training, Equipment eval/education, Bed mobility, Gait training  Equipment Recommended: Kathrin Solen, Wheelchair       See flowsheet documentation for full assessment, interventions and recommendations  Outcome: Not Progressing  Note:    Problem List: Decreased strength, Decreased endurance, Impaired balance, Decreased mobility, Decreased cognition, Impaired judgement, Decreased safety awareness, Obesity  Assessment: pt began tx session lying supine in the bed and was agreeable to participate in PT intervention  pt only agreeable to participate in TE activities while in supine position  PT interventions focused on strengthening of BLE's in order to increase standing tolerance and functional transfers when pt is agreeable to participate  pt was able to perform TE activities while in supine position but required several therapeutic rest breaks throughout tx session  pt required between [facility] and [facility] for all TE activities  pt would benefit from continued skilled PT intervention in order to increase functional mobility and all functional transfers to and from RW  post tx session pt in bed with call bell and all pt needs met  PT Discharge Recommendation: Post acute rehabilitation services    See flowsheet documentation for full assessment

## 2022-11-29 NOTE — PHYSICAL THERAPY NOTE
PHYSICAL THERAPY NOTE          Patient Name: Sendy Sarkar  [id number] Date: 2022 1205   PT Last Visit   PT Visit Date 22   Note Type   Note Type Treatment   Pain Assessment   Pain Assessment Tool 0-10   Pain Score No Pain   Pain Location/Orientation Location: Generalized   Effect of Pain on Daily Activities limited activity tolerance   Patient's Stated Pain Goal No pain   Hospital Pain Intervention(s) Repositioned; Ambulation/increased activity; Rest   Multiple Pain Sites No   Pain Rating: FLACC (Rest) - Face 1   Pain Rating: FLACC (Rest) - Legs 0   Pain Rating: FLACC (Rest) - Activity 0   Pain Rating: FLACC (Rest) - Cry 0   Pain Rating: FLACC (Rest) - Consolability 0   Score: FLACC (Rest) 1   Pain Rating: FLACC (Activity) - Face 1   Pain Rating: FLACC (Activity) - Legs 0   Pain Rating: FLACC (Activity) - Activity 0   Pain Rating: FLACC (Activity) - Cry 1   Pain Rating: FLACC (Activity) - Consolability 0   Score: FLACC (Activity) 2   Restrictions/Precautions   Weight Bearing Precautions Per Order No   Other Precautions Impulsive;Cognitive; Chair Alarm; Bed Alarm;Telemetry;O2;Fall Risk;Pain   General   Chart Reviewed Yes   Response to Previous Treatment Patient reporting fatigue but able to participate  (only partiicpated in TE activities in supine position  pt refused all OOB activities)   Cognition   Overall Cognitive Status Impaired   Arousal/Participation Alert; Responsive; Cooperative   Attention Attends with cues to redirect   Orientation Level Oriented to person;Oriented to place;Oriented to situation   Memory Decreased recall of recent events   Following Commands Follows one step commands with increased time or repetition   Comments pt identified by name and    Subjective   Subjective (S)  pt was agreeable to only supine TE activities and no OOB mobility   Bed Mobility   Rolling R Unable to assess Rolling L Unable to assess   Supine to Sit Unable to assess   Sit to Supine Unable to assess   Additional Comments pt required max  for repositioning towards [facility]   Transfers   Sit to Stand Unable to assess   Stand to Sit Unable to assess   Stand pivot Unable to assess   Additional Comments pt decliner all OOB mobility in todays tx session   Balance   Static Sitting Fair -   Dynamic Sitting Fair -   Static Standing Poor +   Dynamic Standing Poor +   Ambulatory Poor +   Endurance Deficit   Endurance Deficit Yes   Endurance Deficit Description limited activity tolerance   Activity Tolerance   Activity Tolerance Patient limited by fatigue   Nurse Made Aware Spoke to RN and OT [employer]   Exercises   Quad Sets Supine;10 reps;AROM; Bilateral   Heelslides Supine;10 reps;AAROM; Bilateral   Glute Sets Supine;10 reps;AROM; Bilateral   Hip Flexion Supine;10 reps;AAROM; Bilateral   Hip Abduction Supine;10 reps;AAROM; Bilateral   Hip Adduction Supine;10 reps;AAROM; Bilateral   Knee AROM Short Arc Quad Supine;10 reps;AROM; Bilateral   Ankle Pumps Supine;20 reps;AROM; Bilateral   Assessment   Problem List Decreased strength;Decreased endurance; Impaired balance;Decreased mobility; Decreased cognition; Impaired judgement;Decreased safety awareness; Obesity   Assessment pt began tx session lying supine in the bed and was agreeable to participate in PT intervention  pt only agreeable to participate in TE activities while in supine position  PT interventions focused on strengthening of BLE's in order to increase standing tolerance and functional transfers when pt is agreeable to participate  pt was able to perform TE activities while in supine position but required several therapeutic rest breaks throughout tx session  pt required between [facility] and [facility] for all TE activities   pt would benefit from continued skilled PT intervention in order to increase functional mobility and all functional transfers to and from   post tx session pt in bed with call bell and all pt needs met  Goals   Patient Goals to go home   STG Expiration Date 12/03/22   PT Treatment Day 1   Plan   Treatment/Interventions Functional transfer training;LE strengthening/ROM; Therapeutic exercise; Endurance training;Cognitive reorientation;Patient/family training;Equipment eval/education; Bed mobility;Gait training;Spoke to nursing   Progress Slow progress, decreased activity tolerance   PT Frequency 3-5x/wk   Recommendation   PT Discharge Recommendation Post acute rehabilitation services   Equipment Recommended Walker; Wheelchair   AM-PAC Basic Mobility Inpatient   Turning in Bed Without Bedrails 2   Lying on Back to Sitting on Edge of Flat Bed 2   Moving Bed to Chair 3   Standing Up From Chair 3   Walk in Room 3   Climb 3-5 Stairs 1   Basic Mobility Inpatient Raw Score 14   Basic Mobility Standardized Score 35 55   Highest Level Of Mobility   -[facility] Goal 4: Move to chair/commode   -HL Achieved 2: Bed activities/Dependent transfer   Education   Education Provided Other  (TE activities)   Patient Reinforcement needed   End of Consult   Patient Position at End of Consult Supine;Bed/Chair alarm activated; All needs within reach   The patient's AM-PAC Basic Mobility Inpatient Short Form Raw Score is 14  A Raw score of less than or equal to 16 suggests the patient may benefit from discharge to post-acute rehabilitation services  Please also refer to the recommendation of the Physical Therapist for safe discharge planning       Tess Cash

## 2022-11-29 NOTE — ASSESSMENT & PLAN NOTE
Recent Labs     11/27/22  0432 11/27/22  1146 11/28/22  0511 11/28/22  1159 11/28/22  2345 11/29/22  0602 11/29/22  1351   HGB 7 0*   < > 6 9*   < > 6 9* 8 0* 8 2*   MCV 95  --  96  --   --   --   --     < > = values in this interval not displayed  Likely 2/2 acute blood loss anemia vs hemolysis  Patient with 3 episodes of melanotic stool on 11/24/22 with drop in Hgb required 2 units of prbc total  Hemoglobin overnight was found to be 6 9  Patient was given additional PRBC transfusion  Recent bowel movements overnight were reported to be black tarry stools  Hemolysis labs: reticulocyte count elevated, and LDH within normal limits  Repeat push enteroscopy was prematurely aborted due to respiratory instability  Colonoscopy was unable to yield good images due to inadequate bowel prep and inability to advance the scope past the hepatic flexure  Plan:  · Patient will continue clear liquid diet will start Glycolax bowel prep for extended 2-3 day regimen in anticipation for repeat colonoscopy  • Monitor CBC/H&H q 6 hrs, after H&H at 6:00 p m  and if stable, can transition to H&H Q 12 hours  • Transfuse if Hb < 7  • Consent for transfusion obtained:  Yes   • Continue IV PPI BID  · Will continue to hold coumadin  · Follow-up with direct Suresh test and haptoglobin for evaluation of hemolysis

## 2022-11-29 NOTE — PROGRESS NOTES
[facility]  Progress Note - Koby Guzmán 1960, 60 y o  female MRN: [id number]  Unit/Bed#: S -01 Encounter: [id number]  Primary Care Provider: Jazmin Oakley MD   Date and time admitted to hospital: 11/21/2022 12:58 PM    * Acute blood loss anemia  Assessment & Plan  Recent Labs     11/27/22  0432 11/27/22  1146 11/28/22  0511 11/28/22  1159 11/28/22  2345 11/29/22  0602 11/29/22  1351   HGB 7 0*   < > 6 9*   < > 6 9* 8 0* 8 2*   MCV 95  --  96  --   --   --   --     < > = values in this interval not displayed  Likely 2/2 acute blood loss anemia vs hemolysis  Patient with 3 episodes of melanotic stool on 11/24/22 with drop in Hgb required 2 units of prbc total  Hemoglobin overnight was found to be 6 9  Patient was given additional PRBC transfusion  Recent bowel movements overnight were reported to be black tarry stools  Hemolysis labs: reticulocyte count elevated, and LDH within normal limits  Repeat push enteroscopy was prematurely aborted due to respiratory instability  Colonoscopy was unable to yield good images due to inadequate bowel prep and inability to advance the scope past the hepatic flexure  Plan:  · Patient will continue clear liquid diet will start Glycolax bowel prep for extended 2-3 day regimen in anticipation for repeat colonoscopy  • Monitor CBC/H&H q 6 hrs, after H&H at 6:00 p m  and if stable, can transition to H&H Q 12 hours  • Transfuse if Hb < 7  • Consent for transfusion obtained:  Yes   • Continue IV PPI BID  · Will continue to hold coumadin  · Follow-up with direct Suresh test and haptoglobin for evaluation of hemolysis        Acute on chronic respiratory failure with hypoxia and hypercapnia (HCC)  Assessment & Plan  · In the setting of COPD/asthma with acute exacerbation and recent PNA   · At baseline, requires 4L NC, O2 requirement now at baseline  · S/p 5 day course abx for pneumonia  · CT C/A/P showed multifocal scattered patchy nodular opacities in RUL and possibly RLL, was treated for pneumonia with ceftriaxone this admission  · VBG on 11/28 showed a respiratory acidosis with appropriate compensation     Plan  · Continue BIPAP HS and PRN for SASHA/OHS, encourage compliance  · Continue scheduled nebs and steroids for COPD exacerbation  · Continue prednisone taper  · Encourage cough, deep breathing, IS, ambulation with assistance  · Monitor oxygen requirements    Panniculitis  Assessment & Plan  · 11/20 CT C/A/P: acute panniculitis, not appreciated on physical exam  · Monitor off abx    Acute metabolic encephalopathy  Assessment & Plan  · Likely in the setting of acute on chronic hypoxic and hypercapnic respiratory failure, now improving    Plan  · Continue BIPAP PRN and HS  · Regulate sleep/wake  · Delirium precautions  · Frequent neuro checks and reorientation     Bilateral pleural effusion  Assessment & Plan  · S/p diuresis with 40mg IV lasix daily    Plan  · Would continue PRN diuresis for goal -500ml over 24 hours     Pneumonia due to infectious organism  Assessment & Plan  · Diagnosed as OP  · S/p 5 days abx as OP and continued on admssion  · Patient remains afebrile, without leukocytosis, procal negative   · Monitor off abx     Leg wound, left, initial encounter  Assessment & Plan  · Wound care nurse following    Diabetes mellitus type 2 in obese [facility])  Assessment & Plan  Lab Results   Component Value Date    HGBA1C 7 0 (H) 11/01/2022       Recent Labs     11/28/22  2110 11/29/22  0714 11/29/22  1050 11/29/22  1609   POCGLU 198* 119 161* 279*       Blood Sugar Average: Last 72 hrs:  (P) 211 4     · ACHS glucose checks and SSI for goal glucose 140-180  · Sliding scale  · Hypoglycemic protocol    Morbid (severe) obesity due to excess calories (HCC)  Assessment & Plan  · Lifestyle modification    Anxiety  Assessment & Plan  · Continue Atarax 50 mg qHS    GERD (gastroesophageal reflux disease)  Assessment & Plan  · Continue IV Protonix 40 mg q 12 hours    Hypothyroid  Assessment & Plan  · Continue home levothyroxine    COPD with asthma ([address] )  Assessment & Plan  · With acute exacerbation  · Continue scheduled xopenex, atrovent, pulmicort, perforomist    Plan  · Continue prednisone taper, currently on 30 mg  · Continue to monitor oxygen saturations  · Will continue to follow with Pulmonary recommendations      Paroxysmal atrial fibrillation [facility])  Assessment & Plan  Recent Labs     11/25/22  1456 11/26/22  0438 11/27/22  0432   INR 2 08* 2 19* 1 68*     · Currently rate controlled  · Continue home metoprolol    Plan:  · Continue to hold home coumadin with supratherapeutic INR  · Monitor for signs of bleeding with BMs  · Restart Coumadin when in therapeutic range vs reversal w/ FFP/PCC as needed if bleeding and drop in Hgb recurs        Dyslipidemia  Assessment & Plan  · Continue home statin    Chronic kidney disease, stage III (moderate) ([facility])  Assessment & Plan  Lab Results   Component Value Date    EGFR 35 11/29/2022    EGFR 33 11/28/2022    EGFR 33 11/27/2022    CREATININE 1 56 (H) 11/29/2022    CREATININE 1 61 (H) 11/28/2022    CREATININE 1 62 (H) 11/27/2022     · Baseline Cr: 1 8 - 2 4  · Currently at baseline    Plan:  · Continue to monitor I/O and renal indices  · Continue PRN diuresis for goal negative 500ml/24 hours  · Avoid hypotension  · Avoid nephrotoxins  · Monitor BMP      VTE Pharmacologic Prophylaxis: VTE Score: 9 High Risk (Score >/= 5) - Pharmacological DVT Prophylaxis Contraindicated  Sequential Compression Devices Ordered  Patient Centered Rounds: I performed bedside rounds with nursing staff today  Discussions with Specialists or Other Care Team Provider:  GI    Education and Discussions with Family / Patient: Attempted to update  (significant other) via phone  Unable to contact      Current Length of Stay: 8 day(s)  Current Patient Status: Inpatient   Discharge Plan: Anticipate discharge in 48-72 hrs to rehab facility  Code Status: Level 1 - Full Code    Subjective:   Patient seen and examined at the bedside this morning  She required another transfusion overnight  She states her breathing feels mildly worse this morning compared to yesterday  She is complaining that her BiPAP dries her nose is and that she did not use the BiPAP last night  Her so it feels sore after her EGD yesterday  She denies any symptoms of chest pain, shortness a breath, fatigued, and dizziness  She states she is upset due to possibly having to repeat the EGD and colonoscopy in staying longer to do further bowel prep while having an unsatisfactory diet  Objective:     Vitals:   Temp (24hrs), Av 8 °F (36 6 °C), Min:97 3 °F (36 3 °C), Max:98 7 °F (37 1 °C)    Temp:  [97 3 °F (36 3 °C)-98 7 °F (37 1 °C)] 98 7 °F (37 1 °C)  HR:  [74-98] 98  Resp:  [16-18] 16  BP: ()/(50-69) 112/65  SpO2:  [92 %-98 %] 92 %  Body mass index is 55 82 kg/m²  Input and Output Summary (last 24 hours): Intake/Output Summary (Last 24 hours) at 2022 1628  Last data filed at 2022 0336  Gross per 24 hour   Intake 385 ml   Output --   Net 385 ml       Physical Exam:   Physical Exam  Vitals and nursing note reviewed  Constitutional:       General: She is not in acute distress  Appearance: She is obese  She is ill-appearing  HENT:      Head: Normocephalic and atraumatic  Mouth/Throat:      Mouth: Mucous membranes are moist    Eyes:      Pupils: Pupils are equal, round, and reactive to light  Cardiovascular:      Rate and Rhythm: Normal rate  Rhythm irregular  Pulses: Normal pulses  Heart sounds: Normal heart sounds  No murmur heard  No friction rub  No gallop  Pulmonary:      Effort: Pulmonary effort is normal  No respiratory distress  Breath sounds: No wheezing, rhonchi or rales  Comments: Limited d/t body habitus  Diminished    Abdominal:      General: Bowel sounds are normal  There is no distension  Palpations: Abdomen is soft  Tenderness: There is no abdominal tenderness  Musculoskeletal:         General: Swelling present  Normal range of motion  Cervical back: Neck supple  Right lower leg: Edema present  Left lower leg: Edema present  Skin:     General: Skin is warm and dry  Capillary Refill: Capillary refill takes less than 2 seconds  Neurological:      General: No focal deficit present  Mental Status: She is alert and oriented to person, place, and time  Cranial Nerves: No cranial nerve deficit  Sensory: No sensory deficit  Motor: No weakness  Additional Data:     Labs:  Results from last 7 days   Lab Units 11/29/22  1351 11/28/22  1159 11/28/22  0511 11/26/22  1154 11/26/22  0438 11/25/22  1152 11/25/22  0550 11/24/22  0550 11/23/22  0356   WBC Thousand/uL  --   --  15 47*   < > 11 48*  --  13 09*   < > 8 23   HEMOGLOBIN g/dL 8 2*   < > 6 9*   < > 7 3*   < > 8 0*   < > 8 6*   HEMATOCRIT %  --   --  21 9*   < > 24 1*  --  26 9*   < > 30 8*   PLATELETS Thousands/uL  --   --  176   < > 177  --  223   < > 236   BANDS PCT %  --   --   --   --   --   --  6  --   --    NEUTROS PCT %  --   --   --   --   --   --   --   --  80*   LYMPHS PCT %  --   --   --   --   --   --   --   --  9*   LYMPHO PCT %  --   --   --   --  8*  --  17   < >  --    MONOS PCT %  --   --   --   --   --   --   --   --  8   MONO PCT %  --   --   --   --  14*  --  7   < >  --    EOS PCT %  --   --   --   --  0  --  2   < > 0    < > = values in this interval not displayed       Results from last 7 days   Lab Units 11/29/22  0602 11/24/22  0550 11/23/22  0356   SODIUM mmol/L 142   < > 139   POTASSIUM mmol/L 4 7   < > 4 6   CHLORIDE mmol/L 100   < > 97   CO2 mmol/L 38*   < > 37*   BUN mg/dL 86*   < > 38*   CREATININE mg/dL 1 56*   < > 1 69*   ANION GAP mmol/L 4   < > 5   CALCIUM mg/dL 8 8   < > 9 2   ALBUMIN g/dL  --   --  3 1*   TOTAL BILIRUBIN mg/dL  --   --  0 28   ALK PHOS U/L --   --  118*   ALT U/L  --   --  9   AST U/L  --   --  15   GLUCOSE RANDOM mg/dL 126   < > 161*    < > = values in this interval not displayed       Results from last 7 days   Lab Units 11/28/22  0511   INR  1 65*     Results from last 7 days   Lab Units 11/29/22  1609 11/29/22  1050 11/29/22  0714 11/28/22  2110 11/28/22  1622 11/28/22  1123 11/28/22  0642 11/27/22  2107 11/27/22  1539 11/27/22  1110 11/27/22  0737 11/26/22  2055   POC GLUCOSE mg/dl 279* 161* 119 198* 231* 178* 165* 291* 227* 200* 164* 233*               Lines/Drains:  Invasive Devices     Peripheral Intravenous Line  Duration           Peripheral IV 11/25/22 Proximal;Right;Upper;Ventral (anterior) Arm 4 days    Long-Dwell Peripheral IV (Midline) 61/75/40 Left Cephalic 3 days                  Telemetry:  Telemetry Orders (From admission, onward)             48 Hour Telemetry Monitoring  Continuous x 48 hours        Expiring   References:    Telemetry Guidelines   Question:  Reason for 48 Hour Telemetry  Answer:  Arrhythmias Requiring Medical Therapy (eg  SVT, Vtach/fib, Bradycardia, Uncontrolled A-fib)                 Telemetry Reviewed: Normal Sinus Rhythm  Indication for Continued Telemetry Use: Acute respiratory failure on Bipap             Imaging: Reviewed radiology reports from this admission including: chest xray, abdominal/pelvic CT and CT head    Recent Cultures (last 7 days):         Last 24 Hours Medication List:   Current Facility-Administered Medications   Medication Dose Route Frequency Provider Last Rate   • acetaminophen  650 mg Oral Q6H PRN Berenice Pryor MD     • atorvastatin  10 mg Oral Daily Berenice Pryor MD     • budesonide  0 5 mg Nebulization Q12H Berenice Pryor MD     • collagenase   Topical Daily Berenice Pryor MD     • formoterol  20 mcg Nebulization Q12H Berenice Pryor MD     • guaiFENesin  1,200 mg Oral BID Berenice Pryor MD     • hydrOXYzine HCL  50 mg Oral HS Berenice Cyr Curtis Crandall MD     • insulin lispro  2-12 Units Subcutaneous TID [facility] Berenice Tubbs MD     • insulin lispro  2-12 Units Subcutaneous HS Berenice Tubbs MD     • ipratropium  0 5 mg Nebulization TID Berenice Tubbs MD     • levalbuterol  1 25 mg Nebulization TID Berenice Tubbs MD     • levothyroxine  100 mcg Oral Early Morning Berenice Tubbs MD     • melatonin  6 mg Oral HS Berenice Tubbs MD     • metoprolol tartrate  50 mg Oral BID Berenice Tubbs MD     • ondansetron  4 mg Intravenous Q8H PRN Berenice Tubbs MD     • pantoprazole  40 mg Intravenous Q12H [address] Berenice Tubbs MD     • polyethylene glycol  238 g Oral Once SURINDER Rosado     • polyethylene glycol  17 g Oral Daily Berenice Tubbs MD     • predniSONE  30 mg Oral Daily Berenice Tubbs MD      Followed by   • [START ON 12/1/2022] predniSONE  10 mg Oral Daily Berenice Tubbs MD     • senna  1 tablet Oral BID Berenice Tubbs MD          Today, Patient Was Seen By: Melvin Davenport, DO    **Please Note: This note may have been constructed using a voice recognition system  **

## 2022-11-29 NOTE — PLAN OF CARE
Problem: Potential for Falls  Goal: Patient will remain free of falls  Description: INTERVENTIONS:  - Educate patient/family on patient safety including physical limitations  - Instruct patient to call for assistance with activity   - Consult OT/PT to assist with strengthening/mobility   - Keep Call bell within reach  - Keep bed low and locked with side rails adjusted as appropriate  - Keep care items and personal belongings within reach  - Initiate and maintain comfort rounds  - Make Fall Risk Sign visible to staff  - Apply yellow socks and bracelet for high fall risk patients  - Consider moving patient to room near nurses station  Outcome: Progressing     Problem: MOBILITY - ADULT  Goal: Maintain or return to baseline ADL function  Description: INTERVENTIONS:  -  Assess patient's ability to carry out ADLs; assess patient's baseline for ADL function and identify physical deficits which impact ability to perform ADLs (bathing, care of mouth/teeth, toileting, grooming, dressing, etc )  - Assess/evaluate cause of self-care deficits   - Assess range of motion  - Assess patient's mobility; develop plan if impaired  - Assess patient's need for assistive devices and provide as appropriate  - Encourage maximum independence but intervene and supervise when necessary  - Involve family in performance of ADLs  - Assess for home care needs following discharge   - Consider OT consult to assist with ADL evaluation and planning for discharge  - Provide patient education as appropriate  Outcome: Progressing  Goal: Maintains/Returns to pre admission functional level  Description: INTERVENTIONS:  - Perform BMAT or MOVE assessment daily    - Set and communicate daily mobility goal to care team and patient/family/caregiver     - Collaborate with rehabilitation services on mobility goals if consulted  - Out of bed for toileting  - Record patient progress and toleration of activity level   Outcome: Progressing     Problem: Prexisting or High Potential for Compromised Skin Integrity  Goal: Skin integrity is maintained or improved  Description: INTERVENTIONS:  - Identify patients at risk for skin breakdown  - Assess and monitor skin integrity  - Assess and monitor nutrition and hydration status  - Monitor labs   - Assess for incontinence   - Turn and reposition patient  - Assist with mobility/ambulation  - Relieve pressure over bony prominences  - Avoid friction and shearing  - Provide appropriate hygiene as needed including keeping skin clean and dry  - Evaluate need for skin moisturizer/barrier cream  - Collaborate with interdisciplinary team   - Patient/family teaching  - Consider wound care consult   Outcome: Progressing     Problem: Nutrition/Hydration-ADULT  Goal: Nutrient/Hydration intake appropriate for improving, restoring or maintaining nutritional needs  Description: Monitor and assess patient's nutrition/hydration status for malnutrition  Collaborate with interdisciplinary team and initiate plan and interventions as ordered  Monitor patient's weight and dietary intake as ordered or per policy  Utilize nutrition screening tool and intervene as necessary  Determine patient's food preferences and provide high-protein, high-caloric foods as appropriate       INTERVENTIONS:  - Monitor oral intake, urinary output, labs, and treatment plans  - Assess nutrition and hydration status and recommend course of action  - Evaluate amount of meals eaten  - Assist patient with eating if necessary   - Allow adequate time for meals  - Recommend/ encourage appropriate diets, oral nutritional supplements, and vitamin/mineral supplements  - Order, calculate, and assess calorie counts as needed  - Recommend, monitor, and adjust tube feedings and TPN/PPN based on assessed needs  - Assess need for intravenous fluids  - Provide specific nutrition/hydration education as appropriate  - Include patient/family/caregiver in decisions related to nutrition  Outcome: Progressing     Problem: PAIN - ADULT  Goal: Verbalizes/displays adequate comfort level or baseline comfort level  Description: Interventions:  - Encourage patient to monitor pain and request assistance  - Assess pain using appropriate pain scale  - Administer analgesics based on type and severity of pain and evaluate response  - Implement non-pharmacological measures as appropriate and evaluate response  - Consider cultural and social influences on pain and pain management  - Notify physician/advanced practitioner if interventions unsuccessful or patient reports new pain  Outcome: Progressing     Problem: INFECTION - ADULT  Goal: Absence or prevention of progression during hospitalization  Description: INTERVENTIONS:  - Assess and monitor for signs and symptoms of infection  - Monitor lab/diagnostic results  - Monitor all insertion sites, i e  indwelling lines, tubes, and drains  - Monitor endotracheal if appropriate and nasal secretions for changes in amount and color  - Palmer appropriate cooling/warming therapies per order  - Administer medications as ordered  - Instruct and encourage patient and family to use good hand hygiene technique  - Identify and instruct in appropriate isolation precautions for identified infection/condition  Outcome: Progressing     Problem: SAFETY ADULT  Goal: Patient will remain free of falls  Description: INTERVENTIONS:  - Educate patient/family on patient safety including physical limitations  - Instruct patient to call for assistance with activity   - Consult OT/PT to assist with strengthening/mobility   - Keep Call bell within reach  - Keep bed low and locked with side rails adjusted as appropriate  - Keep care items and personal belongings within reach  - Initiate and maintain comfort rounds  - Make Fall Risk Sign visible to staff  - Apply yellow socks and bracelet for high fall risk patients  - Consider moving patient to room near nurses station  Outcome: Progressing  Goal: Maintain or return to baseline ADL function  Description: INTERVENTIONS:  -  Assess patient's ability to carry out ADLs; assess patient's baseline for ADL function and identify physical deficits which impact ability to perform ADLs (bathing, care of mouth/teeth, toileting, grooming, dressing, etc )  - Assess/evaluate cause of self-care deficits   - Assess range of motion  - Assess patient's mobility; develop plan if impaired  - Assess patient's need for assistive devices and provide as appropriate  - Encourage maximum independence but intervene and supervise when necessary  - Involve family in performance of ADLs  - Assess for home care needs following discharge   - Consider OT consult to assist with ADL evaluation and planning for discharge  - Provide patient education as appropriate  Outcome: Progressing  Goal: Maintains/Returns to pre admission functional level  Description: INTERVENTIONS:  - Perform BMAT or MOVE assessment daily    - Set and communicate daily mobility goal to care team and patient/family/caregiver     - Collaborate with rehabilitation services on mobility goals if consulted  - Out of bed for toileting  - Record patient progress and toleration of activity level   Outcome: Progressing     Problem: DISCHARGE PLANNING  Goal: Discharge to home or other facility with appropriate resources  Description: INTERVENTIONS:  - Identify barriers to discharge w/patient and caregiver  - Arrange for needed discharge resources and transportation as appropriate  - Identify discharge learning needs (meds, wound care, etc )  - Arrange for interpretive services to assist at discharge as needed  - Refer to Case Management Department for coordinating discharge planning if the patient needs post-hospital services based on physician/advanced practitioner order or complex needs related to functional status, cognitive ability, or social support system  Outcome: Progressing     Problem: Knowledge Deficit  Goal: Patient/family/caregiver demonstrates understanding of disease process, treatment plan, medications, and discharge instructions  Description: Complete learning assessment and assess knowledge base    Interventions:  - Provide teaching at level of understanding  - Provide teaching via preferred learning methods  Outcome: Progressing     Problem: CARDIOVASCULAR - ADULT  Goal: Maintains optimal cardiac output and hemodynamic stability  Description: INTERVENTIONS:  - Monitor I/O, vital signs and rhythm  - Monitor for S/S and trends of decreased cardiac output  - Administer and titrate ordered vasoactive medications to optimize hemodynamic stability  - Assess quality of pulses, skin color and temperature  - Assess for signs of decreased coronary artery perfusion  - Instruct patient to report change in severity of symptoms  Outcome: Progressing  Goal: Absence of cardiac dysrhythmias or at baseline rhythm  Description: INTERVENTIONS:  - Continuous cardiac monitoring, vital signs, obtain 12 lead EKG if ordered  - Administer antiarrhythmic and heart rate control medications as ordered  - Monitor electrolytes and administer replacement therapy as ordered  Outcome: Progressing     Problem: RESPIRATORY - ADULT  Goal: Achieves optimal ventilation and oxygenation  Description: INTERVENTIONS:  - Assess for changes in respiratory status  - Assess for changes in mentation and behavior  - Position to facilitate oxygenation and minimize respiratory effort  - Oxygen administered by appropriate delivery if ordered  - Initiate smoking cessation education as indicated  - Encourage broncho-pulmonary hygiene including cough, deep breathe, Incentive Spirometry  - Assess the need for suctioning and aspirate as needed  - Assess and instruct to report SOB or any respiratory difficulty  - Respiratory Therapy support as indicated  Outcome: Progressing     Problem: GASTROINTESTINAL - ADULT  Goal: Maintains or returns to baseline bowel function  Description: INTERVENTIONS:  - Assess bowel function  - Encourage oral fluids to ensure adequate hydration  - Administer IV fluids if ordered to ensure adequate hydration  - Administer ordered medications as needed  - Encourage mobilization and activity  - Consider nutritional services referral to assist patient with adequate nutrition and appropriate food choices  Outcome: Progressing  Goal: Maintains adequate nutritional intake  Description: INTERVENTIONS:  - Monitor percentage of each meal consumed  - Identify factors contributing to decreased intake, treat as appropriate  - Assist with meals as needed  - Monitor I&O, weight, and lab values if indicated  - Obtain nutrition services referral as needed  Outcome: Progressing     Problem: METABOLIC, FLUID AND ELECTROLYTES - ADULT  Goal: Fluid balance maintained  Description: INTERVENTIONS:  - Monitor labs   - Monitor I/O and WT  - Instruct patient on fluid and nutrition as appropriate  - Assess for signs & symptoms of volume excess or deficit  Outcome: Progressing  Goal: Glucose maintained within target range  Description: INTERVENTIONS:  - Monitor Blood Glucose as ordered  - Assess for signs and symptoms of hyperglycemia and hypoglycemia  - Administer ordered medications to maintain glucose within target range  - Assess nutritional intake and initiate nutrition service referral as needed  Outcome: Progressing     Problem: SKIN/TISSUE INTEGRITY - ADULT  Goal: Skin Integrity remains intact(Skin Breakdown Prevention)  Description: Assess:  -Assess extremities for adequate circulation and sensation     Bed Management:  -Have minimal linens on bed & keep smooth, unwrinkled  -Change linens as needed when moist or perspiring        Toileting:  -Offer bedside commode        Skin Care:  -Avoid use of baby powder, tape, friction and shearing, hot water or constrictive clothing    -Do not massage red bony areas    Next Steps:  Outcome: Progressing  Goal: Incision(s), wounds(s) or drain site(s) healing without S/S of infection  Description: INTERVENTIONS  - Assess and document dressing, incision, wound bed, drain sites and surrounding tissue  - Provide patient and family education  Outcome: Progressing  Goal: Pressure injury heals and does not worsen  Description: Interventions:  - Implement low air loss mattress or specialty surface (Criteria met)  - Apply silicone foam dressing  - Apply fecal or urinary incontinence containment device   - Consider nutrition services referral as needed  Outcome: Progressing     Problem: MUSCULOSKELETAL - ADULT  Goal: Maintain or return mobility to safest level of function  Description: INTERVENTIONS:  - Assess patient's ability to carry out ADLs; assess patient's baseline for ADL function and identify physical deficits which impact ability to perform ADLs (bathing, care of mouth/teeth, toileting, grooming, dressing, etc )  - Assess/evaluate cause of self-care deficits   - Assess range of motion  - Assess patient's mobility  - Assess patient's need for assistive devices and provide as appropriate  - Encourage maximum independence but intervene and supervise when necessary  - Involve family in performance of ADLs  - Assess for home care needs following discharge   - Consider OT consult to assist with ADL evaluation and planning for discharge  - Provide patient education as appropriate  Outcome: Progressing  Goal: Maintain proper alignment of affected body part  Description: INTERVENTIONS:  - Support, maintain and protect limb and body alignment  - Provide patient/ family with appropriate education  Outcome: Progressing

## 2022-11-29 NOTE — ASSESSMENT & PLAN NOTE
Lab Results   Component Value Date    EGFR 35 11/29/2022    EGFR 33 11/28/2022    EGFR 33 11/27/2022    CREATININE 1 56 (H) 11/29/2022    CREATININE 1 61 (H) 11/28/2022    CREATININE 1 62 (H) 11/27/2022     · Baseline Cr: 1 8 - 2 4  · Currently at baseline    Plan:  · Continue to monitor I/O and renal indices  · Continue PRN diuresis for goal negative 500ml/24 hours  · Avoid hypotension  · Avoid nephrotoxins  · Monitor BMP

## 2022-11-29 NOTE — PROGRESS NOTES
Progress Note - Hubert Santo 60 y o  female MRN: [id number]    Unit/Bed#: S -01 Encounter: [id number]        Assessment/Plan:  1  Acute on chronic anemia with melena in the setting of small bowel AVMs  EGD push enteroscopy last week with multiple AVMs in the duodenum status post APC as well as 1 the stomach       -due to persistent drops in hemoglobin over weekend, EGD with push in colonoscopy was repeated, patient with normal push enteroscopy but aborted prematurely due to respiratory instability and colonoscopy showed significant amount of old blood present the transverse descending sigmoid colon and rectum which obscured visualization but could not advanced the scope past the hepatic flexure due to significant looping  -continue to trend hemoglobin  -consider repeat colonoscopy with extended 2-3 day prep, will need to be on larger hospital bed for procedure  -continue PPI b i d  -patient would likely need capsule endoscopy in the outpatient setting  -hemoglobin did drop last night to 6 9, received another unit of packed RBCs and hemoglobin is 8 today  -will repeat colonoscopy with extended prep which should be done on an inpatient basis  -patient remains on clear liquid diet, will continue the same    Subjective:   Patient is lying in bed  She reports that she has not moved her bowels since the other day  States that she remains on a clear liquid diet but is hungry  Denies any significant abdominal pain      Objective:     Vitals: /61   Pulse 83   Temp (!) 97 4 °F (36 3 °C)   Resp 16   Ht 5' 4" (1 626 m)   Wt (!) 148 kg (325 lb 2 9 oz)   SpO2 94%   BMI 55 82 kg/m²       Physical Exam:  Gen-alert no acute distress  Abd-positive bowel sounds, obese, nondistended, nontender,  No rebound rigidity guarding       Lab, Imaging and other studies:   Recent Results (from the past 72 hour(s))   Fingerstick Glucose (POCT)    Collection Time: 11/26/22 11:44 AM   Result Value Ref Range    POC Glucose 281 (H) 65 - 140 mg/dl   Hemoglobin    Collection Time: 11/26/22 11:54 AM   Result Value Ref Range    Hemoglobin 7 2 (L) 11 5 - 15 4 g/dL   Fingerstick Glucose (POCT)    Collection Time: 11/26/22  5:04 PM   Result Value Ref Range    POC Glucose 279 (H) 65 - 140 mg/dl   Hemoglobin    Collection Time: 11/26/22  6:02 PM   Result Value Ref Range    Hemoglobin 7 0 (L) 11 5 - 15 4 g/dL   Fingerstick Glucose (POCT)    Collection Time: 11/26/22  8:55 PM   Result Value Ref Range    POC Glucose 233 (H) 65 - 140 mg/dl   Hemoglobin    Collection Time: 11/26/22 11:01 PM   Result Value Ref Range    Hemoglobin 6 4 (LL) 11 5 - 15 4 g/dL   Type and screen    Collection Time: 11/27/22 12:57 AM   Result Value Ref Range    ABO Grouping O     Rh Factor Positive     Antibody Screen Negative     Specimen Expiration Date [id number]    Basic metabolic panel    Collection Time: 11/27/22  4:32 AM   Result Value Ref Range    Sodium 139 135 - 147 mmol/L    Potassium 5 1 3 5 - 5 3 mmol/L    Chloride 98 96 - 108 mmol/L    CO2 38 (H) 21 - 32 mmol/L    ANION GAP 3 (L) 4 - 13 mmol/L     (H) 5 - 25 mg/dL    Creatinine 1 62 (H) 0 60 - 1 30 mg/dL    Glucose 219 (H) 65 - 140 mg/dL    Calcium 9 1 8 4 - 10 2 mg/dL    eGFR 33 ml/min/1 73sq m   CBC    Collection Time: 11/27/22  4:32 AM   Result Value Ref Range    WBC 15 82 (H) 4 31 - 10 16 Thousand/uL    RBC 2 38 (L) 3 81 - 5 12 Million/uL    Hemoglobin 7 0 (L) 11 5 - 15 4 g/dL    Hematocrit 22 7 (L) 34 8 - 46 1 %    MCV 95 82 - 98 fL    MCH 29 4 26 8 - 34 3 pg    MCHC 30 8 (L) 31 4 - 37 4 g/dL    RDW 19 9 (H) 11 6 - 15 1 %    Platelets 514 381 - 160 Thousands/uL    MPV 10 8 8 9 - 12 7 fL   Protime-INR    Collection Time: 11/27/22  4:32 AM   Result Value Ref Range    Protime 20 0 (H) 11 6 - 14 5 seconds    INR 1 68 (H) 0 84 - 1 19   Lactate dehydrogenase    Collection Time: 11/27/22  4:32 AM   Result Value Ref Range     140 - 271 U/L   TSH, 3rd generation with Free T4 reflex    Collection Time: 11/27/22  4:32 AM   Result Value Ref Range    TSH 3RD GENERATON 2 102 0 450 - 4 500 uIU/mL   Prepare Leukoreduced RBC: 1 Units    Collection Time: 11/27/22  5:47 AM   Result Value Ref Range    Unit Product Code L9180G00     Unit Number [id number]     Unit ABO O     Unit [facility] POS     Crossmatch Compatible     Unit Dispense Status Presumed Trans     Unit Product Volume 350 mL   Fingerstick Glucose (POCT)    Collection Time: 11/27/22  7:37 AM   Result Value Ref Range    POC Glucose 164 (H) 65 - 140 mg/dl   Fingerstick Glucose (POCT)    Collection Time: 11/27/22 11:10 AM   Result Value Ref Range    POC Glucose 200 (H) 65 - 140 mg/dl   Hemoglobin    Collection Time: 11/27/22 11:46 AM   Result Value Ref Range    Hemoglobin 6 7 (LL) 11 5 - 15 4 g/dL   Retic Count    Collection Time: 11/27/22 11:46 AM   Result Value Ref Range    Retic Ct Abs 144,300 (H) 14,097 - 95,744    Retic Ct Pct 6 47 (H) 0 37 - 1 87 %   Hemoglobin and hematocrit, blood    Collection Time: 11/27/22  1:28 PM   Result Value Ref Range    Hemoglobin 6 6 (LL) 11 5 - 15 4 g/dL    Hematocrit 21 5 (L) 34 8 - 46 1 %   Fingerstick Glucose (POCT)    Collection Time: 11/27/22  3:39 PM   Result Value Ref Range    POC Glucose 227 (H) 65 - 140 mg/dl   Hemoglobin    Collection Time: 11/27/22  8:40 PM   Result Value Ref Range    Hemoglobin 7 1 (L) 11 5 - 15 4 g/dL   Fingerstick Glucose (POCT)    Collection Time: 11/27/22  9:07 PM   Result Value Ref Range    POC Glucose 291 (H) 65 - 140 mg/dl   Occult blood 1-3, stool    Collection Time: 11/28/22  2:23 AM   Result Value Ref Range    Fecal Occult Blood Diagnostic Positive (A) Negative    Fecal Occult Blood Diagnostic 2 Positive (A) Negative   Hemoglobin    Collection Time: 11/28/22  2:27 AM   Result Value Ref Range    Hemoglobin 6 9 (LL) 11 5 - 15 4 g/dL   CBC and Platelet    Collection Time: 11/28/22  5:11 AM   Result Value Ref Range    WBC 15 47 (H) 4 31 - 10 16 Thousand/uL    RBC 2 28 (L) 3 81 - 5 12 Million/uL    Hemoglobin 6 9 (LL) 11 5 - 15 4 g/dL    Hematocrit 21 9 (L) 34 8 - 46 1 %    MCV 96 82 - 98 fL    MCH 30 3 26 8 - 34 3 pg    MCHC 31 5 31 4 - 37 4 g/dL    RDW 20 7 (H) 11 6 - 15 1 %    Platelets 271 970 - 738 Thousands/uL    MPV 11 0 8 9 - 12 7 fL   Protime-INR    Collection Time: 11/28/22  5:11 AM   Result Value Ref Range    Protime 19 8 (H) 11 6 - 14 5 seconds    INR 1 65 (H) 0 84 - 3 77   Basic metabolic panel    Collection Time: 11/28/22  5:11 AM   Result Value Ref Range    Sodium 140 135 - 147 mmol/L    Potassium 5 1 3 5 - 5 3 mmol/L    Chloride 97 96 - 108 mmol/L    CO2 39 (H) 21 - 32 mmol/L    ANION GAP 4 4 - 13 mmol/L     (H) 5 - 25 mg/dL    Creatinine 1 61 (H) 0 60 - 1 30 mg/dL    Glucose 193 (H) 65 - 140 mg/dL    Calcium 9 0 8 4 - 10 2 mg/dL    eGFR 33 ml/min/1 73sq m   Prepare Leukoreduced RBC: 1 Units, Leukoreduced    Collection Time: 11/28/22  5:47 AM   Result Value Ref Range    Unit Product Code N0008O99     Unit Number [id number]     Unit ABO O     Unit [facility] POS     Crossmatch Compatible     Unit Dispense Status Presumed Trans     Unit Product Volume 350 ml   Fingerstick Glucose (POCT)    Collection Time: 11/28/22  6:42 AM   Result Value Ref Range    POC Glucose 165 (H) 65 - 140 mg/dl   Blood gas, venous    Collection Time: 11/28/22 10:50 AM   Result Value Ref Range    pH, Shayan 7 386 7 300 - 7 400    pCO2, Shayan 67 2 (H) 42 0 - 50 0 mm Hg    pO2, Shayan 76 7 (H) 35 0 - 45 0 mm Hg    HCO3, Shayan 39 4 (H) 24 - 30 mmol/L    Base Excess, Shayan 12 7 mmol/L    O2 Content, Shayan 10 4 ml/dL    O2 HGB, VENOUS 91 7 (H) 60 0 - 80 0 %   Fingerstick Glucose (POCT)    Collection Time: 11/28/22 11:23 AM   Result Value Ref Range    POC Glucose 178 (H) 65 - 140 mg/dl   Hemoglobin    Collection Time: 11/28/22 11:59 AM   Result Value Ref Range    Hemoglobin 7 6 (L) 11 5 - 15 4 g/dL   Fingerstick Glucose (POCT)    Collection Time: 11/28/22  4:22 PM   Result Value Ref Range    POC Glucose 231 (H) 65 - 140 mg/dl   Hemoglobin    Collection Time: 11/28/22  7:39 PM   Result Value Ref Range    Hemoglobin 7 0 (L) 11 5 - 15 4 g/dL   Fingerstick Glucose (POCT)    Collection Time: 11/28/22  9:10 PM   Result Value Ref Range    POC Glucose 198 (H) 65 - 140 mg/dl   Hemoglobin    Collection Time: 11/28/22 11:45 PM   Result Value Ref Range    Hemoglobin 6 9 (LL) 11 5 - 15 4 g/dL   Prepare Leukoreduced RBC: 1 Units    Collection Time: 11/29/22  5:47 AM   Result Value Ref Range    Unit Product Code E0228J96     Unit Number [id number]     Unit ABO O     Unit [facility] POS     Crossmatch Compatible     Unit Dispense Status Presumed Trans     Unit Product Volume 300 ml   Prepare Leukoreduced RBC: 1 Units    Collection Time: 11/29/22  5:47 AM   Result Value Ref Range    Unit Product Code O7616H37     Unit Number [id number]     Unit ABO O     Unit [facility] POS     Crossmatch Compatible     Unit Dispense Status Presumed Trans     Unit Product Volume 350 ml   Hemoglobin    Collection Time: 11/29/22  6:02 AM   Result Value Ref Range    Hemoglobin 8 0 (L) 11 5 - 15 4 g/dL   Basic metabolic panel    Collection Time: 11/29/22  6:02 AM   Result Value Ref Range    Sodium 142 135 - 147 mmol/L    Potassium 4 7 3 5 - 5 3 mmol/L    Chloride 100 96 - 108 mmol/L    CO2 38 (H) 21 - 32 mmol/L    ANION GAP 4 4 - 13 mmol/L    BUN 86 (H) 5 - 25 mg/dL    Creatinine 1 56 (H) 0 60 - 1 30 mg/dL    Glucose 126 65 - 140 mg/dL    Calcium 8 8 8 4 - 10 2 mg/dL    eGFR 35 ml/min/1 73sq m   Fingerstick Glucose (POCT)    Collection Time: 11/29/22  7:14 AM   Result Value Ref Range    POC Glucose 119 65 - 140 mg/dl

## 2022-11-30 LAB
ANION GAP SERPL CALCULATED.3IONS-SCNC: 7 MMOL/L (ref 4–13)
BUN SERPL-MCNC: 72 MG/DL (ref 5–25)
CALCIUM SERPL-MCNC: 8.9 MG/DL (ref 8.4–10.2)
CHLORIDE SERPL-SCNC: 99 MMOL/L (ref 96–108)
CO2 SERPL-SCNC: 34 MMOL/L (ref 21–32)
CREAT SERPL-MCNC: 1.72 MG/DL (ref 0.6–1.3)
ERYTHROCYTE [DISTWIDTH] IN BLOOD BY AUTOMATED COUNT: 19.5 % (ref 11.6–15.1)
GFR SERPL CREATININE-BSD FRML MDRD: 31 ML/MIN/1.73SQ M
GLUCOSE SERPL-MCNC: 116 MG/DL (ref 65–140)
GLUCOSE SERPL-MCNC: 170 MG/DL (ref 65–140)
GLUCOSE SERPL-MCNC: 178 MG/DL (ref 65–140)
GLUCOSE SERPL-MCNC: 232 MG/DL (ref 65–140)
GLUCOSE SERPL-MCNC: 99 MG/DL (ref 65–140)
HCT VFR BLD AUTO: 24.8 % (ref 34.8–46.1)
HGB BLD-MCNC: 7.5 G/DL (ref 11.5–15.4)
HGB BLD-MCNC: 7.9 G/DL (ref 11.5–15.4)
MCH RBC QN AUTO: 29.3 PG (ref 26.8–34.3)
MCHC RBC AUTO-ENTMCNC: 30.2 G/DL (ref 31.4–37.4)
MCV RBC AUTO: 97 FL (ref 82–98)
PLATELET # BLD AUTO: 205 THOUSANDS/UL (ref 149–390)
PMV BLD AUTO: 10.9 FL (ref 8.9–12.7)
POTASSIUM SERPL-SCNC: 4.7 MMOL/L (ref 3.5–5.3)
RBC # BLD AUTO: 2.56 MILLION/UL (ref 3.81–5.12)
SODIUM SERPL-SCNC: 140 MMOL/L (ref 135–147)
WBC # BLD AUTO: 13.89 THOUSAND/UL (ref 4.31–10.16)

## 2022-11-30 RX ORDER — POLYETHYLENE GLYCOL 3350 17 G/17G
238 POWDER, FOR SOLUTION ORAL ONCE
Status: COMPLETED | OUTPATIENT
Start: 2022-11-30 | End: 2022-11-30

## 2022-11-30 RX ADMIN — LEVALBUTEROL HYDROCHLORIDE 1.25 MG: 1.25 SOLUTION, CONCENTRATE RESPIRATORY (INHALATION) at 15:03

## 2022-11-30 RX ADMIN — HYDROXYZINE HYDROCHLORIDE 50 MG: 50 TABLET, FILM COATED ORAL at 21:40

## 2022-11-30 RX ADMIN — POLYETHYLENE GLYCOL 3350 238 G: 17 POWDER, FOR SOLUTION ORAL at 11:44

## 2022-11-30 RX ADMIN — ATORVASTATIN CALCIUM 10 MG: 10 TABLET, FILM COATED ORAL at 08:02

## 2022-11-30 RX ADMIN — IPRATROPIUM BROMIDE 0.5 MG: 0.5 SOLUTION RESPIRATORY (INHALATION) at 15:03

## 2022-11-30 RX ADMIN — METOPROLOL TARTRATE 50 MG: 50 TABLET, FILM COATED ORAL at 08:02

## 2022-11-30 RX ADMIN — INSULIN LISPRO 4 UNITS: 100 INJECTION, SOLUTION INTRAVENOUS; SUBCUTANEOUS at 17:07

## 2022-11-30 RX ADMIN — LEVALBUTEROL HYDROCHLORIDE 1.25 MG: 1.25 SOLUTION, CONCENTRATE RESPIRATORY (INHALATION) at 20:12

## 2022-11-30 RX ADMIN — METOPROLOL TARTRATE 50 MG: 50 TABLET, FILM COATED ORAL at 21:40

## 2022-11-30 RX ADMIN — BUDESONIDE 0.5 MG: 0.5 INHALANT ORAL at 08:15

## 2022-11-30 RX ADMIN — FORMOTEROL FUMARATE DIHYDRATE 20 MCG: 20 SOLUTION RESPIRATORY (INHALATION) at 08:43

## 2022-11-30 RX ADMIN — PANTOPRAZOLE SODIUM 40 MG: 40 INJECTION, POWDER, FOR SOLUTION INTRAVENOUS at 08:02

## 2022-11-30 RX ADMIN — INSULIN LISPRO 2 UNITS: 100 INJECTION, SOLUTION INTRAVENOUS; SUBCUTANEOUS at 21:40

## 2022-11-30 RX ADMIN — BUDESONIDE 0.5 MG: 0.5 INHALANT ORAL at 20:12

## 2022-11-30 RX ADMIN — PANTOPRAZOLE SODIUM 40 MG: 40 INJECTION, POWDER, FOR SOLUTION INTRAVENOUS at 21:39

## 2022-11-30 RX ADMIN — SENNOSIDES 8.6 MG: 8.6 TABLET, FILM COATED ORAL at 08:02

## 2022-11-30 RX ADMIN — IPRATROPIUM BROMIDE 0.5 MG: 0.5 SOLUTION RESPIRATORY (INHALATION) at 20:12

## 2022-11-30 RX ADMIN — LEVOTHYROXINE SODIUM 100 MCG: 100 TABLET ORAL at 05:00

## 2022-11-30 RX ADMIN — PREDNISONE 20 MG: 20 TABLET ORAL at 08:02

## 2022-11-30 RX ADMIN — Medication 6 MG: at 21:40

## 2022-11-30 RX ADMIN — FORMOTEROL FUMARATE DIHYDRATE 20 MCG: 20 SOLUTION RESPIRATORY (INHALATION) at 20:12

## 2022-11-30 RX ADMIN — INSULIN LISPRO 2 UNITS: 100 INJECTION, SOLUTION INTRAVENOUS; SUBCUTANEOUS at 13:00

## 2022-11-30 RX ADMIN — GUAIFENESIN 1200 MG: 600 TABLET, EXTENDED RELEASE ORAL at 17:07

## 2022-11-30 RX ADMIN — POLYETHYLENE GLYCOL 3350 17 G: 17 POWDER, FOR SOLUTION ORAL at 08:02

## 2022-11-30 RX ADMIN — IPRATROPIUM BROMIDE 0.5 MG: 0.5 SOLUTION RESPIRATORY (INHALATION) at 08:15

## 2022-11-30 RX ADMIN — GUAIFENESIN 1200 MG: 600 TABLET, EXTENDED RELEASE ORAL at 08:02

## 2022-11-30 RX ADMIN — LEVALBUTEROL HYDROCHLORIDE 1.25 MG: 1.25 SOLUTION, CONCENTRATE RESPIRATORY (INHALATION) at 08:15

## 2022-11-30 RX ADMIN — COLLAGENASE SANTYL: 250 OINTMENT TOPICAL at 08:08

## 2022-11-30 NOTE — ASSESSMENT & PLAN NOTE
· With acute exacerbation  · Continue scheduled xopenex, atrovent, pulmicort, perforomist    Plan  · Continue prednisone taper  · Continue to monitor oxygen saturations  · Will continue to follow with Pulmonary recommendations

## 2022-11-30 NOTE — ASSESSMENT & PLAN NOTE
Recent Labs     11/28/22  0511 11/28/22  1159 11/29/22  1351 11/29/22  1812 11/30/22  0458   HGB 6 9*   < > 8 2* 7 8* 7 5*   MCV 96  --   --   --  97    < > = values in this interval not displayed  Likely 2/2 acute blood loss anemia vs hemolysis  Patient with 3 episodes of melanotic stool on 11/24/22 with drop in Hgb required 2 units of prbc total  Hemoglobin overnight was found to be 6 9  Patient was given additional PRBC transfusion  Recent bowel movements overnight were reported to be black tarry stools  Hemolysis labs: reticulocyte count elevated, and LDH within normal limits  Repeat push enteroscopy was prematurely aborted due to respiratory instability  Colonoscopy was unable to yield good images due to inadequate bowel prep and inability to advance the scope past the hepatic flexure  Plan:  · Patient will continue clear liquid diet and Glycolax bowel prep for extended 2-3 day regimen in anticipation for repeat colonoscopy  • Monitor H&H Q 12 hours  • Transfuse if Hb < 7  • Consent for transfusion obtained:  Yes   • Continue IV PPI BID  · Will continue to hold coumadin  · Follow-up with direct Suresh test and haptoglobin for evaluation of hemolysis

## 2022-11-30 NOTE — PROGRESS NOTES
Progress Note - Jhony Damon 60 y o  female MRN: [id number]    Unit/Bed#: S -01 Encounter: [id number]        Assessment/Plan:  1  Acute on chronic anemia with melena in the setting of small bowel AVMs  EGD push enteroscopy last week with multiple AVMs in the duodenum status post APC as well as 1 the stomach       -due to persistent drops in hemoglobin over weekend, EGD with push in colonoscopy was repeated, patient with normal push enteroscopy but aborted prematurely due to respiratory instability and colonoscopy showed significant amount of old blood present the transverse descending sigmoid colon and rectum which obscured visualization but could not advanced the scope past the hepatic flexure due to significant looping  -continue to trend hemoglobin  -will need repeat colonoscopy with extended 2-3 day prep, will need to be on larger hospital bed for procedure  -continue PPI b i d  -patient would likely need capsule endoscopy in the outpatient setting but may need transfer as inpatient if evidence of persistent bleeding without source found on complete colonoscopy  -hemoglobin at 7 5 today, trended down from 8 2 yesterday  -will repeat colonoscopy with extended prep which should be done on an inpatient basis  -patient remains on clear liquid diet, will continue the same, Miralax prep given yesterday, will order another today and tentatively plan for colonoscopy    Subjective:   Pt is lying in bed- states that she drank all of her Miralax from yesterday  Pt otherwise states that she still is having black stool  Patient otherwise denies any significant abdominal pain  Tolerating clear liquid diet  Partner at bedside        Objective:     Vitals: /57   Pulse 84   Temp 97 9 °F (36 6 °C)   Resp 18   Ht 5' 4" (1 626 m)   Wt (!) 144 kg (317 lb 7 4 oz)   SpO2 97%   BMI 54 49 kg/m²       Physical Exam:  Gen- alert no acute distress  Abd- positive bowel sounds, nontender nondistended, obese, no rebound rigidity guarding       Lab, Imaging and other studies:   Recent Results (from the past 72 hour(s))   Fingerstick Glucose (POCT)    Collection Time: 11/27/22 11:10 AM   Result Value Ref Range    POC Glucose 200 (H) 65 - 140 mg/dl   Hemoglobin    Collection Time: 11/27/22 11:46 AM   Result Value Ref Range    Hemoglobin 6 7 (LL) 11 5 - 15 4 g/dL   Retic Count    Collection Time: 11/27/22 11:46 AM   Result Value Ref Range    Retic Ct Abs 144,300 (H) 14,097 - 95,744    Retic Ct Pct 6 47 (H) 0 37 - 1 87 %   Hemoglobin and hematocrit, blood    Collection Time: 11/27/22  1:28 PM   Result Value Ref Range    Hemoglobin 6 6 (LL) 11 5 - 15 4 g/dL    Hematocrit 21 5 (L) 34 8 - 46 1 %   Fingerstick Glucose (POCT)    Collection Time: 11/27/22  3:39 PM   Result Value Ref Range    POC Glucose 227 (H) 65 - 140 mg/dl   Hemoglobin    Collection Time: 11/27/22  8:40 PM   Result Value Ref Range    Hemoglobin 7 1 (L) 11 5 - 15 4 g/dL   Fingerstick Glucose (POCT)    Collection Time: 11/27/22  9:07 PM   Result Value Ref Range    POC Glucose 291 (H) 65 - 140 mg/dl   Occult blood 1-3, stool    Collection Time: 11/28/22  2:23 AM   Result Value Ref Range    Fecal Occult Blood Diagnostic Positive (A) Negative    Fecal Occult Blood Diagnostic 2 Positive (A) Negative   Hemoglobin    Collection Time: 11/28/22  2:27 AM   Result Value Ref Range    Hemoglobin 6 9 (LL) 11 5 - 15 4 g/dL   CBC and Platelet    Collection Time: 11/28/22  5:11 AM   Result Value Ref Range    WBC 15 47 (H) 4 31 - 10 16 Thousand/uL    RBC 2 28 (L) 3 81 - 5 12 Million/uL    Hemoglobin 6 9 (LL) 11 5 - 15 4 g/dL    Hematocrit 21 9 (L) 34 8 - 46 1 %    MCV 96 82 - 98 fL    MCH 30 3 26 8 - 34 3 pg    MCHC 31 5 31 4 - 37 4 g/dL    RDW 20 7 (H) 11 6 - 15 1 %    Platelets 753 581 - 736 Thousands/uL    MPV 11 0 8 9 - 12 7 fL   Protime-INR    Collection Time: 11/28/22  5:11 AM   Result Value Ref Range    Protime 19 8 (H) 11 6 - 14 5 seconds    INR 1 65 (H) 0 84 - 1 19   Basic metabolic panel    Collection Time: 11/28/22  5:11 AM   Result Value Ref Range    Sodium 140 135 - 147 mmol/L    Potassium 5 1 3 5 - 5 3 mmol/L    Chloride 97 96 - 108 mmol/L    CO2 39 (H) 21 - 32 mmol/L    ANION GAP 4 4 - 13 mmol/L     (H) 5 - 25 mg/dL    Creatinine 1 61 (H) 0 60 - 1 30 mg/dL    Glucose 193 (H) 65 - 140 mg/dL    Calcium 9 0 8 4 - 10 2 mg/dL    eGFR 33 ml/min/1 73sq m   Prepare Leukoreduced RBC: 1 Units, Leukoreduced    Collection Time: 11/28/22  5:47 AM   Result Value Ref Range    Unit Product Code A3735R73     Unit Number [id number]     Unit ABO O     Unit RH POS     Crossmatch Compatible     Unit Dispense Status Presumed Trans     Unit Product Volume 350 ml   Fingerstick Glucose (POCT)    Collection Time: 11/28/22  6:42 AM   Result Value Ref Range    POC Glucose 165 (H) 65 - 140 mg/dl   Blood gas, venous    Collection Time: 11/28/22 10:50 AM   Result Value Ref Range    pH, Shayan 7 386 7 300 - 7 400    pCO2, Shayan 67 2 (H) 42 0 - 50 0 mm Hg    pO2, Shayan 76 7 (H) 35 0 - 45 0 mm Hg    HCO3, Shayan 39 4 (H) 24 - 30 mmol/L    Base Excess, Shayan 12 7 mmol/L    O2 Content, Shayan 10 4 ml/dL    O2 HGB, VENOUS 91 7 (H) 60 0 - 80 0 %   Fingerstick Glucose (POCT)    Collection Time: 11/28/22 11:23 AM   Result Value Ref Range    POC Glucose 178 (H) 65 - 140 mg/dl   Hemoglobin    Collection Time: 11/28/22 11:59 AM   Result Value Ref Range    Hemoglobin 7 6 (L) 11 5 - 15 4 g/dL   Fingerstick Glucose (POCT)    Collection Time: 11/28/22  4:22 PM   Result Value Ref Range    POC Glucose 231 (H) 65 - 140 mg/dl   Hemoglobin    Collection Time: 11/28/22  7:39 PM   Result Value Ref Range    Hemoglobin 7 0 (L) 11 5 - 15 4 g/dL   Fingerstick Glucose (POCT)    Collection Time: 11/28/22  9:10 PM   Result Value Ref Range    POC Glucose 198 (H) 65 - 140 mg/dl   Hemoglobin    Collection Time: 11/28/22 11:45 PM   Result Value Ref Range    Hemoglobin 6 9 (LL) 11 5 - 15 4 g/dL   Prepare Leukoreduced RBC: 1 Units    Collection Time: 11/29/22  5:47 AM   Result Value Ref Range    Unit Product Code J9610K35     Unit Number [id number]     Unit ABO O     Unit [facility] POS     Crossmatch Compatible     Unit Dispense Status Presumed Trans     Unit Product Volume 300 ml   Prepare Leukoreduced RBC: 1 Units    Collection Time: 11/29/22  5:47 AM   Result Value Ref Range    Unit Product Code Z4842D81     Unit Number [id number]     Unit ABO O     Unit [facility] POS     Crossmatch Compatible     Unit Dispense Status Presumed Trans     Unit Product Volume 350 ml   Hemoglobin    Collection Time: 11/29/22  6:02 AM   Result Value Ref Range    Hemoglobin 8 0 (L) 11 5 - 15 4 g/dL   Basic metabolic panel    Collection Time: 11/29/22  6:02 AM   Result Value Ref Range    Sodium 142 135 - 147 mmol/L    Potassium 4 7 3 5 - 5 3 mmol/L    Chloride 100 96 - 108 mmol/L    CO2 38 (H) 21 - 32 mmol/L    ANION GAP 4 4 - 13 mmol/L    BUN 86 (H) 5 - 25 mg/dL    Creatinine 1 56 (H) 0 60 - 1 30 mg/dL    Glucose 126 65 - 140 mg/dL    Calcium 8 8 8 4 - 10 2 mg/dL    eGFR 35 ml/min/1 73sq m   Fingerstick Glucose (POCT)    Collection Time: 11/29/22  7:14 AM   Result Value Ref Range    POC Glucose 119 65 - 140 mg/dl   Fingerstick Glucose (POCT)    Collection Time: 11/29/22 10:50 AM   Result Value Ref Range    POC Glucose 161 (H) 65 - 140 mg/dl   Hemoglobin    Collection Time: 11/29/22  1:51 PM   Result Value Ref Range    Hemoglobin 8 2 (L) 11 5 - 15 4 g/dL   Fingerstick Glucose (POCT)    Collection Time: 11/29/22  4:09 PM   Result Value Ref Range    POC Glucose 279 (H) 65 - 140 mg/dl   Hemoglobin    Collection Time: 11/29/22  6:12 PM   Result Value Ref Range    Hemoglobin 7 8 (L) 11 5 - 15 4 g/dL   Fingerstick Glucose (POCT)    Collection Time: 11/29/22  9:05 PM   Result Value Ref Range    POC Glucose 143 (H) 65 - 140 mg/dl   CBC and Platelet    Collection Time: 11/30/22  4:58 AM   Result Value Ref Range    WBC 13 89 (H) 4 31 - 10 16 Thousand/uL    RBC 2 56 (L) 3 81 - 5 12 Million/uL    Hemoglobin 7 5 (L) 11 5 - 15 4 g/dL    Hematocrit 24 8 (L) 34 8 - 46 1 %    MCV 97 82 - 98 fL    MCH 29 3 26 8 - 34 3 pg    MCHC 30 2 (L) 31 4 - 37 4 g/dL    RDW 19 5 (H) 11 6 - 15 1 %    Platelets 228 530 - 536 Thousands/uL    MPV 10 9 8 9 - 12 7 fL   Basic metabolic panel    Collection Time: 11/30/22  4:58 AM   Result Value Ref Range    Sodium 140 135 - 147 mmol/L    Potassium 4 7 3 5 - 5 3 mmol/L    Chloride 99 96 - 108 mmol/L    CO2 34 (H) 21 - 32 mmol/L    ANION GAP 7 4 - 13 mmol/L    BUN 72 (H) 5 - 25 mg/dL    Creatinine 1 72 (H) 0 60 - 1 30 mg/dL    Glucose 116 65 - 140 mg/dL    Calcium 8 9 8 4 - 10 2 mg/dL    eGFR 31 ml/min/1 73sq m   Fingerstick Glucose (POCT)    Collection Time: 11/30/22  7:11 AM   Result Value Ref Range    POC Glucose 99 65 - 140 mg/dl

## 2022-11-30 NOTE — ASSESSMENT & PLAN NOTE
Lab Results   Component Value Date    HGBA1C 7 0 (H) 11/01/2022       Recent Labs     11/29/22  1609 11/29/22  2105 11/30/22  0711 11/30/22  1138   POCGLU 279* 143* 99 178*       Blood Sugar Average: Last 72 hrs:  (P) 247 [id number]     · ACHS glucose checks and SSI for goal glucose 140-180  · Sliding scale  · Hypoglycemic protocol

## 2022-11-30 NOTE — PROGRESS NOTES
[facility]  Progress Note - Lio Quinonez 1960, 60 y o  female MRN: [id number]  Unit/Bed#: S -01 Encounter: [id number]  Primary Care Provider: Lana Harris MD   Date and time admitted to hospital: 11/21/2022 12:58 PM    * Acute blood loss anemia  Assessment & Plan  Recent Labs     11/28/22  0511 11/28/22  1159 11/29/22  1351 11/29/22  1812 11/30/22  0458   HGB 6 9*   < > 8 2* 7 8* 7 5*   MCV 96  --   --   --  97    < > = values in this interval not displayed  Likely 2/2 acute blood loss anemia vs hemolysis  Patient with 3 episodes of melanotic stool on 11/24/22 with drop in Hgb required 2 units of prbc total  Hemoglobin overnight was found to be 6 9  Patient was given additional PRBC transfusion  Recent bowel movements overnight were reported to be black tarry stools  Hemolysis labs: reticulocyte count elevated, and LDH within normal limits  Repeat push enteroscopy was prematurely aborted due to respiratory instability  Colonoscopy was unable to yield good images due to inadequate bowel prep and inability to advance the scope past the hepatic flexure  Plan:  · Patient will continue clear liquid diet and Glycolax bowel prep for extended 2-3 day regimen in anticipation for repeat colonoscopy  • Monitor H&H Q 12 hours  • Transfuse if Hb < 7  • Consent for transfusion obtained:  Yes   • Continue IV PPI BID  · Will continue to hold coumadin  · Follow-up with direct Suresh test and haptoglobin for evaluation of hemolysis        Acute on chronic respiratory failure with hypoxia and hypercapnia (HCC)  Assessment & Plan  · In the setting of COPD/asthma with acute exacerbation and recent PNA   · At baseline, requires 4L NC, O2 requirement now at baseline  · S/p 5 day course abx for pneumonia  · CT C/A/P showed multifocal scattered patchy nodular opacities in RUL and possibly RLL, was treated for pneumonia with ceftriaxone this admission  · VBG on 11/28 showed a respiratory acidosis with appropriate compensation     Plan  · Continue BIPAP HS and PRN for SASHA/OHS, encourage compliance  · Continue scheduled nebs and steroids for COPD exacerbation  · Continue prednisone taper  · Encourage cough, deep breathing, IS, ambulation with assistance  · Monitor oxygen requirements    Panniculitis  Assessment & Plan  · 11/20 CT C/A/P: acute panniculitis, not appreciated on physical exam  · Monitor off abx    Acute metabolic encephalopathy  Assessment & Plan  · Likely in the setting of acute on chronic hypoxic and hypercapnic respiratory failure, now improving    Plan  · Continue BIPAP PRN and HS  · Regulate sleep/wake  · Delirium precautions  · Frequent neuro checks and reorientation     Bilateral pleural effusion  Assessment & Plan  · S/p diuresis with 40mg IV lasix daily    Plan  · Would continue PRN diuresis for goal -500ml over 24 hours     Pneumonia due to infectious organism  Assessment & Plan  · Diagnosed as OP  · S/p 5 days abx as OP and continued on admssion  · Patient remains afebrile, without leukocytosis, procal negative   · Monitor off abx     Leg wound, left, initial encounter  Assessment & Plan  · Wound care nurse following    Diabetes mellitus type 2 in obese [facility])  Assessment & Plan  Lab Results   Component Value Date    HGBA1C 7 0 (H) 11/01/2022       Recent Labs     11/29/22  1609 11/29/22  2105 11/30/22  0711 11/30/22  1138   POCGLU 279* 143* 99 178*       Blood Sugar Average: Last 72 hrs:  (P) 287 [id number]     · ACHS glucose checks and SSI for goal glucose 140-180  · Sliding scale  · Hypoglycemic protocol    Morbid (severe) obesity due to excess calories (HCC)  Assessment & Plan  · Lifestyle modification    Anxiety  Assessment & Plan  · Continue Atarax 50 mg qHS    GERD (gastroesophageal reflux disease)  Assessment & Plan  · Continue IV Protonix 40 mg q 12 hours    Hypothyroid  Assessment & Plan  · Continue home levothyroxine    COPD with asthma [facility])  Assessment & Plan  · With acute exacerbation  · Continue scheduled xopenex, atrovent, pulmicort, perforomist    Plan  · Continue prednisone taper  · Continue to monitor oxygen saturations  · Will continue to follow with Pulmonary recommendations      Paroxysmal atrial fibrillation [facility])  Assessment & Plan  Recent Labs     11/25/22  1456 11/26/22  0438 11/27/22  0432   INR 2 08* 2 19* 1 68*     · Currently rate controlled  · Continue home metoprolol    Plan:  · Continue to hold home coumadin with supratherapeutic INR  · Monitor for signs of bleeding with BMs  · Restart Coumadin when in therapeutic range vs reversal w/ FFP/PCC as needed if bleeding and drop in Hgb recurs        Dyslipidemia  Assessment & Plan  · Continue home statin    Chronic kidney disease, stage III (moderate) [facility])  Assessment & Plan  Lab Results   Component Value Date    EGFR 31 11/30/2022    EGFR 35 11/29/2022    EGFR 33 11/28/2022    CREATININE 1 72 (H) 11/30/2022    CREATININE 1 56 (H) 11/29/2022    CREATININE 1 61 (H) 11/28/2022     · Baseline Cr: 1 8 - 2 4  · Currently at baseline    Plan:  · Continue to monitor I/O and renal indices  · Continue PRN diuresis for goal negative 500ml/24 hours  · Avoid hypotension  · Avoid nephrotoxins  · Monitor BMP      VTE Pharmacologic Prophylaxis: VTE Score: 9 High Risk (Score >/= 5) - Pharmacological DVT Prophylaxis Contraindicated  Sequential Compression Devices Ordered  Patient Centered Rounds: I performed bedside rounds with nursing staff today  Discussions with Specialists or Other Care Team Provider: GI, pulmonary    Education and Discussions with Family / Patient: Updated  (significant other) via phone  Current Length of Stay: 9 day(s)  Current Patient Status: Inpatient   Discharge Plan: Anticipate discharge in 48 hrs to rehab facility  Code Status: Level 1 - Full Code    Subjective:   No acute events overnight  Patient seen and examined at the bedside this morning  She reports another black tarry bowel movement this morning  She slept well with BIPAP overnight  And states her breathing feels fine  Still feels congested with coughing up of clear phlegm  She took all of the glycolax bowel prep yesterday and is eager to get the repeat colonoscopy over with  Objective:     Vitals:   Temp (24hrs), Av 3 °F (36 8 °C), Min:97 9 °F (36 6 °C), Max:98 7 °F (37 1 °C)    Temp:  [97 9 °F (36 6 °C)-98 7 °F (37 1 °C)] 97 9 °F (36 6 °C)  HR:  [] 84  Resp:  [18] 18  BP: (101-133)/(57-89) 101/57  SpO2:  [94 %-98 %] 97 %  Body mass index is 54 49 kg/m²  Input and Output Summary (last 24 hours): Intake/Output Summary (Last 24 hours) at 2022 1516  Last data filed at 2022 1245  Gross per 24 hour   Intake 300 ml   Output --   Net 300 ml       Physical Exam:   Physical Exam  Vitals and nursing note reviewed  Constitutional:       General: She is not in acute distress  Appearance: She is obese  She is ill-appearing  HENT:      Head: Normocephalic and atraumatic  Mouth/Throat:      Mouth: Mucous membranes are moist    Eyes:      Pupils: Pupils are equal, round, and reactive to light  Cardiovascular:      Rate and Rhythm: Normal rate  Rhythm irregular  Pulses: Normal pulses  Heart sounds: Normal heart sounds  No murmur heard  No friction rub  No gallop  Pulmonary:      Effort: Pulmonary effort is normal  No respiratory distress  Breath sounds: No wheezing, rhonchi or rales  Comments: Limited d/t body habitus  Diminished    Abdominal:      General: Bowel sounds are normal  There is no distension  Palpations: Abdomen is soft  Tenderness: There is no abdominal tenderness  Musculoskeletal:         General: Swelling present  Normal range of motion  Cervical back: Neck supple  Right lower leg: Edema present  Left lower leg: Edema present  Skin:     General: Skin is warm and dry        Capillary Refill: Capillary refill takes less than 2 seconds  Neurological:      General: No focal deficit present  Mental Status: She is alert and oriented to person, place, and time  Cranial Nerves: No cranial nerve deficit  Sensory: No sensory deficit  Motor: No weakness  Additional Data:     Labs:  Results from last 7 days   Lab Units 11/30/22  0458 11/26/22  1154 11/26/22  0438 11/25/22  1152 11/25/22  0550   WBC Thousand/uL 13 89*   < > 11 48*  --  13 09*   HEMOGLOBIN g/dL 7 5*   < > 7 3*   < > 8 0*   HEMATOCRIT % 24 8*   < > 24 1*  --  26 9*   PLATELETS Thousands/uL 205   < > 177  --  223   BANDS PCT %  --   --   --   --  6   LYMPHO PCT %  --   --  8*  --  17   MONO PCT %  --   --  14*  --  7   EOS PCT %  --   --  0  --  2    < > = values in this interval not displayed       Results from last 7 days   Lab Units 11/30/22  0458   SODIUM mmol/L 140   POTASSIUM mmol/L 4 7   CHLORIDE mmol/L 99   CO2 mmol/L 34*   BUN mg/dL 72*   CREATININE mg/dL 1 72*   ANION GAP mmol/L 7   CALCIUM mg/dL 8 9   GLUCOSE RANDOM mg/dL 116     Results from last 7 days   Lab Units 11/28/22  0511   INR  1 65*     Results from last 7 days   Lab Units 11/30/22  1138 11/30/22  0711 11/29/22  2105 11/29/22  1609 11/29/22  1050 11/29/22  0714 11/28/22  2110 11/28/22  1622 11/28/22  1123 11/28/22  0642 11/27/22  2107 11/27/22  1539   POC GLUCOSE mg/dl 178* 99 143* 279* 161* 119 198* 231* 178* 165* 291* 227*               Lines/Drains:  Invasive Devices     Peripheral Intravenous Line  Duration           Peripheral IV 11/25/22 Proximal;Right;Upper;Ventral (anterior) Arm 5 days    Long-Dwell Peripheral IV (Midline) 73/63/91 Left Cephalic 4 days                      Imaging: Reviewed radiology reports from this admission including: chest xray, chest CT scan, abdominal/pelvic CT and CT head    Recent Cultures (last 7 days):         Last 24 Hours Medication List:   Current Facility-Administered Medications   Medication Dose Route Frequency Provider Last Rate   • acetaminophen  650 mg Oral Q6H PRN Berenice López Cea, MD     • atorvastatin  10 mg Oral Daily Berenice López Cea, MD     • budesonide  0 5 mg Nebulization Q12H Berenice López Cea, MD     • collagenase   Topical Daily Berenice López Cea, MD     • formoterol  20 mcg Nebulization Q12H Berenice López Cea, MD     • guaiFENesin  1,200 mg Oral BID Berenice López Cea, MD     • hydrOXYzine HCL  50 mg Oral HS Berenice López Cea, MD     • insulin lispro  2-12 Units Subcutaneous TID AC Berenice López Cea, MD     • insulin lispro  2-12 Units Subcutaneous HS Berenice López Cea, MD     • ipratropium  0 5 mg Nebulization TID Berenice López Cea, MD     • levalbuterol  1 25 mg Nebulization TID Berenice López Cea, MD     • levothyroxine  100 mcg Oral Early Morning Berenice López Cea, MD     • melatonin  6 mg Oral HS Berenice López Cea, MD     • metoprolol tartrate  50 mg Oral BID Berenice López Cea, MD     • ondansetron  4 mg Intravenous Q8H PRN Berenice López Cea, MD     • pantoprazole  40 mg Intravenous Q12H [address] Berenice López Cea, MD     • polyethylene glycol  17 g Oral Daily Berenice López Cea, MD     • [START ON 12/1/2022] predniSONE  10 mg Oral Daily Leanne Cardenas MD     • senna  1 tablet Oral BID Berenice López Cea, MD          Today, Patient Was Seen By: Shae Dunn DO    **Please Note: This note may have been constructed using a voice recognition system  **

## 2022-11-30 NOTE — PROGRESS NOTES
Progress Note - Pulmonary   Amye Labs 60 y o  female MRN: [id number]  Unit/Bed#: S -01 Encounter: [id number]    Assessment/Plan:    Acute on chronic hypoxic hypercapnic respiratory failure, multifactorial due to below              Baseline oxygen requirement is 4 liters via nasal cannula               Titrate oxygen to maintain saturations greater than or equal to 89%              Activity as tolerated               Continue BiPAP as listed below      Possible COPD of unknown severity with acute exacerbation              Continue prednisone taper as previously delineated yesterday               Continue Pulmicort/Perforomist twice daily with Xopenex/Atrovent 3 times daily              Outpatient pulmonary follow-up and PFTs     Acute blood loss anemia with known gastric and duodenal AVMs              Continue to follow H&H per primary team               Gastroenterology is following               Status post push enteroscopy and colonoscopy  There is consideration for repeat colonoscopy with extended prep pending course      Restrictive lung disease due to morbid obesity and elevated hemidiaphragm with SASHA/obesity hypoventilation              Continue BiPAP 14/6 at HS  We did discuss BiPAP qualification and she is agreeable to using BiPAP in the home setting  Qualification will need to be done prior to discharge      Outpatient pulmonary follow-up after discharge  Will need BiPAP qualification within the last 24 hours of hospital admission  Chief Complaint:    “I feel better ”    Subjective:    Adelina sitting out of bed in a chair  She reports she is feeling better  She has no specific pulmonary complaints  She wore the BiPAP for 4 hours last night and did well with it  Objective:    Vitals: Blood pressure 101/57, pulse 84, temperature 97 9 °F (36 6 °C), resp   rate 18, height 5' 4" (1 626 m), weight (!) 144 kg (317 lb 7 4 oz), SpO2 97 %, not currently breastfeeding  2L NC,Body mass index is 54 49 kg/m²  No intake or output data in the 24 hours ending 11/30/22 1239    Invasive Devices     Peripheral Intravenous Line  Duration           Peripheral IV 11/25/22 Proximal;Right;Upper;Ventral (anterior) Arm 5 days    Long-Dwell Peripheral IV (Midline) 77/87/96 Left Cephalic 4 days                Physical Exam:     Physical Exam  Vitals reviewed  Constitutional:       General: She is not in acute distress  Appearance: She is well-developed and well-nourished  She is morbidly obese  She is not toxic-appearing or diaphoretic  Interventions: Nasal cannula in place  HENT:      Head: Normocephalic and atraumatic  Eyes:      General: No scleral icterus  Extraocular Movements: EOM normal    Neck:      Trachea: No tracheal deviation  Cardiovascular:      Rate and Rhythm: Normal rate and regular rhythm  Heart sounds: S1 normal and S2 normal  No murmur heard  No friction rub  No gallop  Pulmonary:      Effort: Pulmonary effort is normal  No tachypnea, accessory muscle usage or respiratory distress  Breath sounds: Normal breath sounds  No stridor  No decreased breath sounds, wheezing, rhonchi or rales  Chest:      Chest wall: No tenderness  Abdominal:      General: Bowel sounds are normal  There is no distension  Palpations: Abdomen is soft  Tenderness: There is no abdominal tenderness  Musculoskeletal:         General: No edema  Cervical back: Neck supple  Skin:     General: Skin is warm and dry  Findings: No rash  Nails: There is no cyanosis  Neurological:      Mental Status: She is alert and oriented to person, place, and time  GCS: GCS eye subscore is 4  GCS verbal subscore is 5  GCS motor subscore is 6  Motor: Motor strength is normal    Psychiatric:         Mood and Affect: Mood and affect normal          Speech: Speech normal          Behavior: Behavior normal  Behavior is cooperative  Labs:   CBC:   Lab Results   Component Value Date    WBC 13 89 (H) 11/30/2022    HGB 7 5 (L) 11/30/2022    HCT 24 8 (L) 11/30/2022    MCV 97 11/30/2022     11/30/2022    MCH 29 3 11/30/2022    MCHC 30 2 (L) 11/30/2022    RDW 19 5 (H) 11/30/2022    MPV 10 9 11/30/2022   , CMP:   Lab Results   Component Value Date    SODIUM 140 11/30/2022    K 4 7 11/30/2022    CL 99 11/30/2022    CO2 34 (H) 11/30/2022    BUN 72 (H) 11/30/2022    CREATININE 1 72 (H) 11/30/2022    CALCIUM 8 9 11/30/2022    EGFR 31 11/30/2022     Imaging and other studies: None today

## 2022-11-30 NOTE — PLAN OF CARE
Problem: OCCUPATIONAL THERAPY ADULT  Goal: Performs self-care activities at highest level of function for planned discharge setting  See evaluation for individualized goals  Description: Treatment Interventions: ADL retraining, Functional transfer training, Endurance training, Cognitive reorientation, Patient/family training, Equipment evaluation/education, Compensatory technique education, Energy conservation, Activityengagement          See flowsheet documentation for full assessment, interventions and recommendations  Outcome: Progressing  Note: Limitation: Decreased ADL status, Decreased UE strength, Decreased Safe judgement during ADL, Decreased endurance, Decreased self-care trans, Decreased high-level ADLs, Decreased cognition  Prognosis: Fair  Assessment: Pt seen for skilled OT tx session day 2 from [id number] to max I w/ ADLs and improve engagement  Pt agreeable to participate in session reporting she needs to use the bathroom  Pt required mod A to complete bed mobility  Pt required min <> mod A to complete sit <> stand and functional transfer to commode using RW  Pt required max A to complete LBD  Continue to recommend post acute rehab when medically stable for discharge from acute care (vs less likely [address]) pend support / assist [facility] sig [facility] at [facility]  Pt would benefit from low boy bed to max I w/ bed mobility and functional transfers   Will continue to follow     OT Discharge Recommendation: Post acute rehabilitation services

## 2022-11-30 NOTE — OCCUPATIONAL THERAPY NOTE
Occupational Therapy Progress Note     Patient Name: Helena Denver OEPHY'I Date: 11/30/2022  Problem List  Principal Problem:    Acute blood loss anemia  Active Problems:    Chronic kidney disease, stage III (moderate) (HCC)    Dyslipidemia    Paroxysmal atrial fibrillation (HCC)    COPD with asthma (HCC)    Hypothyroid    GERD (gastroesophageal reflux disease)    Anxiety    Morbid (severe) obesity due to excess calories ([facility] )    Diabetes mellitus type 2 in obese ([facility] )    Leg wound, left, initial encounter    Pneumonia due to infectious organism    Bilateral pleural effusion    Acute metabolic encephalopathy    Panniculitis    Acute on chronic respiratory failure with hypoxia and hypercapnia ([facility] )       11/30/22 1112   OT Last Visit   OT Visit Date 11/30/22  (Wednesday)   Note Type   Note Type Treatment   Pain Assessment   Pain Assessment Tool FLACC   Pain Location/Orientation Location: Back; Location: Generalized   Effect of Pain on Daily Activities limits activity jina, stand jina   Patient's Stated Pain Goal No pain   Hospital Pain Intervention(s) Repositioned; Ambulation/increased activity; Emotional support   Pain Rating: FLACC (Rest) - Face 1   Pain Rating: FLACC (Rest) - Legs 0   Pain Rating: FLACC (Rest) - Activity 0   Pain Rating: FLACC (Rest) - Cry 0   Pain Rating: FLACC (Rest) - Consolability 0   Score: FLACC (Rest) 1   Pain Rating: FLACC (Activity) - Face 1   Pain Rating: FLACC (Activity) - Legs 0   Pain Rating: FLACC (Activity) - Activity 1   Pain Rating: FLACC (Activity) - Cry 1   Pain Rating: FLACC (Activity) - Consolability 1   Score: FLACC (Activity) 4   Restrictions/Precautions   Weight Bearing Precautions Per Order No   Other Precautions Impulsive;Cognitive; Chair Alarm; Bed Alarm;O2;Fall Risk;Pain   Lifestyle   Reciprocal Relationships Supportive significant other, Rneée Bates present during eval   Service to Others 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   Intrinsic Gratification Pt reports enjoying watching tv and when able going to see the lights in [place] [employer])  Stephen Finch shared photo of [place] w/ pt from her phone   ADL   Where Assessed Edge of bed   Eating Assistance 6  Modified independent   Eating Deficit Setup    [address] around back; Setup; Fasteners   LB Dressing Assistance 2  Maximal Assistance   LB Dressing Deficit Don/doff R sock; Don/doff L sock; Don/doff R shoe;Don/doff L shoe; Fasteners   LB Dressing Comments seated at EOB to don socks, sneakers   Toileting Assistance  2  Maximal Assistance   Toileting Deficit Bedside commode;Setup;Perineal hygiene;Supervison/safety   Toileting Comments voided seated on jenn commode  Required A to complete personal hygiene and manage clothing   Bed Mobility   Supine to Sit 3  Moderate assistance   Additional items Assist x 1; Increased time required;Verbal cues;LE management; Bedrails   Sit to Supine Unable to assess   Additional Comments Pt seated OOB in chair at end of session w/ needs met, call bell in reach and chair alarm activated  Provided pt w/ foot stool  Pt able to reposition in chair w/ feet on wall (simulated leg press) w/ B UE support on arm rests   Transfers   Sit to Stand 4  Minimal assistance   Additional items Assist x 1; Increased time required;Verbal cues; Impulsive   Stand to Sit 4  Minimal assistance   Additional items Assist x 1; Increased time required; Impulsive;Verbal cues   Stand pivot   (min <> mod A using RW)   Toilet transfer   (min <> mod A)   Additional items Assist x 1; Impulsive;Verbal cues; Commode   Additional Comments Pt performed sit <> stand 2X w/ min <> mod A  Impulsive and resistant to cues for tech, hand placement     Functional Mobility   Functional Mobility   (min <> mod A)   Additional Comments engaged in short distance functional mobility using RW few feet from commode to recliner chair w/ min <> mod A   Additional items Rolling walker   Toilet Transfers   Toilet Transfer From Rolling walker   Toilet Transfer Type To and from   Toilet Transfer to Extra wide bedside commode   Toilet Transfer Technique Stand pivot; Ambulating  (SPT EOB to commode, short distance functional mob commode to recliner chair)   Toilet Transfers Minimal assistance; Moderate assistance;Verbal cues   Cognition   Overall Cognitive Status Impaired   Arousal/Participation Alert; Cooperative;Responsive   Attention Attends with cues to redirect   Orientation Level Oriented to person;Oriented to place;Oriented to situation   Memory Decreased recall of recent events;Decreased short term memory   Following Commands Follows multistep commands with increased time or repetition   Comments Identified pt by full name and birthdate  Alert, generally oriented and able to follow directions during ADLs  Limited insight into deficits, impulsive   Activity Tolerance   Activity Tolerance Patient limited by fatigue  (limited insight into deficits, impulsive)   Medical Staff Made Aware care coordination w/ RNBryce   Assessment   Assessment Pt seen for skilled OT tx session day 2 from [id number] to max I w/ ADLs and improve engagement  Pt agreeable to participate in session reporting she needs to use the bathroom  Pt required mod A to complete bed mobility  Pt required min <> mod A to complete sit <> stand and functional transfer to commode using RW  Pt required max A to complete LBD  Continue to recommend post acute rehab when medically stable for discharge from acute care (vs less likely [facility] [address]) pend support / assist from sig other at [facility]  Pt would benefit from low boy bed to max I w/ bed mobility and functional transfers  Will continue to follow   Plan   Treatment Interventions ADL retraining;Functional transfer training;UE strengthening/ROM; Endurance training;Patient/family training;Equipment evaluation/education; Compensatory technique education;Continued evaluation; Energy conservation; Activityengagement   Goal Expiration Date 12/07/22   OT Treatment Day 2   OT Frequency 2-3x/wk   Recommendation   OT Discharge Recommendation Post acute rehabilitation services   AM-PAC Daily Activity Inpatient   Lower Body Dressing 2   Bathing 2   Toileting 2   Upper Body Dressing 3   Grooming 3   Eating 3   Daily Activity Raw Score 15   Daily Activity Standardized Score (Calc for Raw Score >=11) 34 69   AM-PAC Applied Cognition Inpatient   Following a Speech/Presentation 3   Understanding Ordinary Conversation 4   Taking Medications 3   Remembering Where Things Are Placed or Put Away 3   Remembering List of 4-5 Errands 2   Taking Care of Complicated Tasks 2   Applied Cognition Raw Score 17   Applied Cognition Standardized Score 36 52   Barthel Index   Feeding 5   Bathing 0   Grooming Score 0   Dressing Score 5   Bladder Score 5   Bowels Score 10   Toilet Use Score 5   Transfers (Bed/Chair) Score 5   Mobility (Level Surface) Score 0   Stairs Score 0   Barthel Index Score 35   Modified Reji Scale   Modified Reji Scale 4      The patient's raw score on the AM-PAC Daily Activity inpatient short form is 15, standardized score is 34 69, less than 39 4  Patients at this level are likely to benefit from discharge to post-acute rehabilitation services  Please refer to the recommendation of the Occupational Therapist for safe discharge planning      CORY Jose/L

## 2022-11-30 NOTE — ASSESSMENT & PLAN NOTE
Lab Results   Component Value Date    EGFR 31 11/30/2022    EGFR 35 11/29/2022    EGFR 33 11/28/2022    CREATININE 1 72 (H) 11/30/2022    CREATININE 1 56 (H) 11/29/2022    CREATININE 1 61 (H) 11/28/2022     · Baseline Cr: 1 8 - 2 4  · Currently at baseline    Plan:  · Continue to monitor I/O and renal indices  · Continue PRN diuresis for goal negative 500ml/24 hours  · Avoid hypotension  · Avoid nephrotoxins  · Monitor BMP

## 2022-11-30 NOTE — PLAN OF CARE
Problem: Prexisting or High Potential for Compromised Skin Integrity  Goal: Skin integrity is maintained or improved  Description: INTERVENTIONS:  - Identify patients at risk for skin breakdown  - Assess and monitor skin integrity  - Assess and monitor nutrition and hydration status  - Monitor labs   - Assess for incontinence   - Turn and reposition patient  - Assist with mobility/ambulation  - Relieve pressure over bony prominences  - Avoid friction and shearing  - Provide appropriate hygiene as needed including keeping skin clean and dry  - Evaluate need for skin moisturizer/barrier cream  - Collaborate with interdisciplinary team   - Patient/family teaching  - Consider wound care consult   Outcome: Progressing     Problem: Nutrition/Hydration-ADULT  Goal: Nutrient/Hydration intake appropriate for improving, restoring or maintaining nutritional needs  Description: Monitor and assess patient's nutrition/hydration status for malnutrition  Collaborate with interdisciplinary team and initiate plan and interventions as ordered  Monitor patient's weight and dietary intake as ordered or per policy  Utilize nutrition screening tool and intervene as necessary  Determine patient's food preferences and provide high-protein, high-caloric foods as appropriate       INTERVENTIONS:  - Monitor oral intake, urinary output, labs, and treatment plans  - Assess nutrition and hydration status and recommend course of action  - Evaluate amount of meals eaten  - Assist patient with eating if necessary   - Allow adequate time for meals  - Recommend/ encourage appropriate diets, oral nutritional supplements, and vitamin/mineral supplements  - Order, calculate, and assess calorie counts as needed  - Recommend, monitor, and adjust tube feedings and TPN/PPN based on assessed needs  - Assess need for intravenous fluids  - Provide specific nutrition/hydration education as appropriate  - Include patient/family/caregiver in decisions related to nutrition  Outcome: Progressing     Problem: PAIN - ADULT  Goal: Verbalizes/displays adequate comfort level or baseline comfort level  Description: Interventions:  - Encourage patient to monitor pain and request assistance  - Assess pain using appropriate pain scale  - Administer analgesics based on type and severity of pain and evaluate response  - Implement non-pharmacological measures as appropriate and evaluate response  - Consider cultural and social influences on pain and pain management  - Notify physician/advanced practitioner if interventions unsuccessful or patient reports new pain  Outcome: Progressing

## 2022-12-01 LAB
ANION GAP SERPL CALCULATED.3IONS-SCNC: 6 MMOL/L (ref 4–13)
BUN SERPL-MCNC: 56 MG/DL (ref 5–25)
CALCIUM SERPL-MCNC: 8.6 MG/DL (ref 8.4–10.2)
CHLORIDE SERPL-SCNC: 100 MMOL/L (ref 96–108)
CO2 SERPL-SCNC: 35 MMOL/L (ref 21–32)
CREAT SERPL-MCNC: 1.81 MG/DL (ref 0.6–1.3)
GFR SERPL CREATININE-BSD FRML MDRD: 29 ML/MIN/1.73SQ M
GLUCOSE SERPL-MCNC: 101 MG/DL (ref 65–140)
GLUCOSE SERPL-MCNC: 108 MG/DL (ref 65–140)
GLUCOSE SERPL-MCNC: 111 MG/DL (ref 65–140)
GLUCOSE SERPL-MCNC: 147 MG/DL (ref 65–140)
GLUCOSE SERPL-MCNC: 179 MG/DL (ref 65–140)
HGB BLD-MCNC: 7.6 G/DL (ref 11.5–15.4)
HGB BLD-MCNC: 8 G/DL (ref 11.5–15.4)
POTASSIUM SERPL-SCNC: 4.1 MMOL/L (ref 3.5–5.3)
SODIUM SERPL-SCNC: 141 MMOL/L (ref 135–147)

## 2022-12-01 RX ADMIN — FORMOTEROL FUMARATE DIHYDRATE 20 MCG: 20 SOLUTION RESPIRATORY (INHALATION) at 20:32

## 2022-12-01 RX ADMIN — LEVALBUTEROL HYDROCHLORIDE 1.25 MG: 1.25 SOLUTION, CONCENTRATE RESPIRATORY (INHALATION) at 07:46

## 2022-12-01 RX ADMIN — METOPROLOL TARTRATE 50 MG: 50 TABLET, FILM COATED ORAL at 08:35

## 2022-12-01 RX ADMIN — Medication 6 MG: at 21:26

## 2022-12-01 RX ADMIN — POLYETHYLENE GLYCOL 3350 17 G: 17 POWDER, FOR SOLUTION ORAL at 08:36

## 2022-12-01 RX ADMIN — POLYETHYLENE GLYCOL 3350, SODIUM SULFATE ANHYDROUS, SODIUM BICARBONATE, SODIUM CHLORIDE, POTASSIUM CHLORIDE 4000 ML: 236; 22.74; 6.74; 5.86; 2.97 POWDER, FOR SOLUTION ORAL at 12:35

## 2022-12-01 RX ADMIN — IPRATROPIUM BROMIDE 0.5 MG: 0.5 SOLUTION RESPIRATORY (INHALATION) at 07:46

## 2022-12-01 RX ADMIN — METOPROLOL TARTRATE 50 MG: 50 TABLET, FILM COATED ORAL at 21:26

## 2022-12-01 RX ADMIN — INSULIN LISPRO 2 UNITS: 100 INJECTION, SOLUTION INTRAVENOUS; SUBCUTANEOUS at 21:26

## 2022-12-01 RX ADMIN — LEVALBUTEROL HYDROCHLORIDE 1.25 MG: 1.25 SOLUTION, CONCENTRATE RESPIRATORY (INHALATION) at 13:59

## 2022-12-01 RX ADMIN — FORMOTEROL FUMARATE DIHYDRATE 20 MCG: 20 SOLUTION RESPIRATORY (INHALATION) at 07:46

## 2022-12-01 RX ADMIN — BUDESONIDE 0.5 MG: 0.5 INHALANT ORAL at 20:32

## 2022-12-01 RX ADMIN — IPRATROPIUM BROMIDE 0.5 MG: 0.5 SOLUTION RESPIRATORY (INHALATION) at 20:31

## 2022-12-01 RX ADMIN — PANTOPRAZOLE SODIUM 40 MG: 40 INJECTION, POWDER, FOR SOLUTION INTRAVENOUS at 21:26

## 2022-12-01 RX ADMIN — COLLAGENASE SANTYL: 250 OINTMENT TOPICAL at 08:53

## 2022-12-01 RX ADMIN — IPRATROPIUM BROMIDE 0.5 MG: 0.5 SOLUTION RESPIRATORY (INHALATION) at 13:59

## 2022-12-01 RX ADMIN — HYDROXYZINE HYDROCHLORIDE 50 MG: 50 TABLET, FILM COATED ORAL at 21:26

## 2022-12-01 RX ADMIN — SENNOSIDES 8.6 MG: 8.6 TABLET, FILM COATED ORAL at 08:36

## 2022-12-01 RX ADMIN — PREDNISONE 10 MG: 10 TABLET ORAL at 08:36

## 2022-12-01 RX ADMIN — GUAIFENESIN 1200 MG: 600 TABLET, EXTENDED RELEASE ORAL at 08:36

## 2022-12-01 RX ADMIN — LEVOTHYROXINE SODIUM 100 MCG: 100 TABLET ORAL at 05:14

## 2022-12-01 RX ADMIN — GUAIFENESIN 1200 MG: 600 TABLET, EXTENDED RELEASE ORAL at 18:45

## 2022-12-01 RX ADMIN — LEVALBUTEROL HYDROCHLORIDE 1.25 MG: 1.25 SOLUTION, CONCENTRATE RESPIRATORY (INHALATION) at 20:31

## 2022-12-01 RX ADMIN — ATORVASTATIN CALCIUM 10 MG: 10 TABLET, FILM COATED ORAL at 08:36

## 2022-12-01 RX ADMIN — PANTOPRAZOLE SODIUM 40 MG: 40 INJECTION, POWDER, FOR SOLUTION INTRAVENOUS at 08:39

## 2022-12-01 RX ADMIN — BUDESONIDE 0.5 MG: 0.5 INHALANT ORAL at 07:46

## 2022-12-01 NOTE — PROGRESS NOTES
[facility]  Progress Note - Jammie Aviles 1960, 60 y o  female MRN: [id number]  Unit/Bed#: S -01 Encounter: [id number]  Primary Care Provider: Teressa Machado MD   Date and time admitted to hospital: 11/21/2022 12:58 PM    * Acute blood loss anemia  Assessment & Plan  Recent Labs     11/30/22  0458 11/30/22  1816 12/01/22  0626   HGB 7 5* 7 9* 8 0*   MCV 97  --   --        Likely 2/2 acute blood loss anemia vs hemolysis  Patient with 3 episodes of melanotic stool on 11/24/22 with drop in Hgb required 2 units of prbc total  Hemoglobin overnight was found to be 6 9  Patient was given additional PRBC transfusion  Recent bowel movements overnight were reported to be black tarry stools  Hemolysis labs: reticulocyte count elevated, and LDH within normal limits  Repeat push enteroscopy was prematurely aborted due to respiratory instability  Colonoscopy was unable to yield good images due to inadequate bowel prep and inability to advance the scope past the hepatic flexure  Plan:  · Diet NPO, patient to complete third day of Glycolax bowel prep in anticipation for repeat colonoscopy  · Repeat colonoscopy tentatively scheduled for today or Friday  • Monitor H&H Q 12 hours  • Transfuse if Hb < 7  • Consent for transfusion obtained:  Yes   • Continue IV PPI BID  · Will continue to hold coumadin  · Follow-up with direct Suresh test and haptoglobin for evaluation of hemolysis        Acute on chronic respiratory failure with hypoxia and hypercapnia (HCC)  Assessment & Plan  · In the setting of COPD/asthma with acute exacerbation and recent PNA   · At baseline, requires 4L NC, O2 requirement now at baseline  · S/p 5 day course abx for pneumonia  · CT C/A/P showed multifocal scattered patchy nodular opacities in RUL and possibly RLL, was treated for pneumonia with ceftriaxone this admission  · VBG on 11/28 showed a respiratory acidosis with appropriate compensation     Plan  · Continue BIPAP HS and PRN for SASHA/OHS, encourage compliance  · Continue scheduled nebs and steroids for COPD exacerbation  · Continue last day of prednisone taper  · Encourage cough, deep breathing, IS, ambulation with assistance  · Monitor oxygen requirements    Panniculitis  Assessment & Plan  · 11/20 CT C/A/P: acute panniculitis, not appreciated on physical exam  · Monitor off abx    Acute metabolic encephalopathy  Assessment & Plan  · Likely in the setting of acute on chronic hypoxic and hypercapnic respiratory failure, now improving    Plan  · Continue BIPAP PRN and HS  · Regulate sleep/wake  · Delirium precautions  · Frequent neuro checks and reorientation     Bilateral pleural effusion  Assessment & Plan  · S/p diuresis with 40mg IV lasix daily    Plan  · Would continue PRN diuresis for goal -500ml over 24 hours     Pneumonia due to infectious organism  Assessment & Plan  · Diagnosed as OP  · S/p 5 days abx as OP and continued on admssion  · Patient remains afebrile, without leukocytosis, procal negative   · Monitor off abx     Leg wound, left, initial encounter  Assessment & Plan  · Wound care nurse following    Diabetes mellitus type 2 in obese [facility])  Assessment & Plan  Lab Results   Component Value Date    HGBA1C 7 0 (H) 11/01/2022       Recent Labs     11/30/22  1138 11/30/22  1610 11/30/22  2112 12/01/22  0758   POCGLU 178* 232* 170* 101       Blood Sugar Average: Last 72 hrs:  (P) 148 [id number]     · ACHS glucose checks and SSI for goal glucose 140-180  · Sliding scale  · Hypoglycemic protocol    Morbid (severe) obesity due to excess calories (HCC)  Assessment & Plan  · Lifestyle modification    Anxiety  Assessment & Plan  · Continue Atarax 50 mg qHS    GERD (gastroesophageal reflux disease)  Assessment & Plan  · Continue IV Protonix 40 mg q 12 hours    Hypothyroid  Assessment & Plan  · Continue home levothyroxine    COPD with asthma ([facility] [address] )  Assessment & Plan  · With acute exacerbation  · Continue scheduled xopenex, atrovent, pulmicort, perforomist    Plan  · Continue last day of prednisone taper  · Continue to monitor oxygen saturations  · Will continue to follow with Pulmonary recommendations      Paroxysmal atrial fibrillation [facility])  Assessment & Plan  Recent Labs     11/25/22  1456 11/26/22  0438 11/27/22  0432   INR 2 08* 2 19* 1 68*     · Currently rate controlled  · Continue home metoprolol    Plan:  · Continue to hold home coumadin with supratherapeutic INR  · Monitor for signs of bleeding with BMs  · Restart Coumadin when in therapeutic range vs reversal w/ FFP/PCC as needed if bleeding and drop in Hgb recurs        Dyslipidemia  Assessment & Plan  · Continue home statin    Chronic kidney disease, stage III (moderate) [facility])  Assessment & Plan  Lab Results   Component Value Date    EGFR 29 12/01/2022    EGFR 31 11/30/2022    EGFR 35 11/29/2022    CREATININE 1 81 (H) 12/01/2022    CREATININE 1 72 (H) 11/30/2022    CREATININE 1 56 (H) 11/29/2022     · Baseline Cr: 1 8 - 2 4  · Currently at baseline    Plan:  · Continue to monitor I/O and renal indices  · Continue PRN diuresis for goal negative 500ml/24 hours  · Avoid hypotension  · Avoid nephrotoxins  · Monitor BMP      VTE Pharmacologic Prophylaxis: VTE Score: 9 High Risk (Score >/= 5) - Pharmacological DVT Prophylaxis Contraindicated  Sequential Compression Devices Ordered  Patient Centered Rounds: I performed bedside rounds with nursing staff today  Discussions with Specialists or Other Care Team Provider: GI, pulmonary    Education and Discussions with Family / Patient: Updated  (significant other) via phone  Current Length of Stay: 10 day(s)  Current Patient Status: Inpatient   Discharge Plan: Anticipate discharge in 48 hrs to rehab facility  Code Status: Level 1 - Full Code    Subjective:   No acute events overnight  Patient seen and examined at the bedside this morning    She continues to report black and tarry bowel movement this morning  She completed two days of the Glycolax bowel prep is frustrated with having to continue  She states her breathing has remained baseline  She did tolerate BiPAP overnight for a total of four hours  She is complaining of thirst dry mouth and wants something to drink by told her she has to remain NPO  Objective:     Vitals:   Temp (24hrs), Av 9 °F (36 6 °C), Min:97 5 °F (36 4 °C), Max:98 3 °F (36 8 °C)    Temp:  [97 5 °F (36 4 °C)-98 3 °F (36 8 °C)] 97 5 °F (36 4 °C)  HR:  [] 96  Resp:  [17-18] 18  BP: (100-126)/(58-64) 110/60  SpO2:  [95 %-100 %] 100 %  Body mass index is 54 87 kg/m²  Input and Output Summary (last 24 hours): Intake/Output Summary (Last 24 hours) at 2022 1120  Last data filed at 2022 1245  Gross per 24 hour   Intake 300 ml   Output --   Net 300 ml       Physical Exam:   Physical Exam  Vitals and nursing note reviewed  Constitutional:       General: She is not in acute distress  Appearance: She is obese  She is ill-appearing  HENT:      Head: Normocephalic and atraumatic  Mouth/Throat:      Mouth: Mucous membranes are moist    Eyes:      Pupils: Pupils are equal, round, and reactive to light  Cardiovascular:      Rate and Rhythm: Normal rate  Rhythm irregular  Pulses: Normal pulses  Heart sounds: Normal heart sounds  No murmur heard  No friction rub  No gallop  Pulmonary:      Effort: Pulmonary effort is normal  No respiratory distress  Breath sounds: No wheezing, rhonchi or rales  Comments: Limited d/t body habitus  Diminished    Abdominal:      General: Bowel sounds are normal  There is no distension  Palpations: Abdomen is soft  Tenderness: There is no abdominal tenderness  Musculoskeletal:         General: Swelling present  Normal range of motion  Cervical back: Neck supple  Right lower leg: Edema present  Left lower leg: Edema present     Skin:     General: Skin is warm and dry       Capillary Refill: Capillary refill takes less than 2 seconds  Neurological:      General: No focal deficit present  Mental Status: She is alert and oriented to person, place, and time  Cranial Nerves: No cranial nerve deficit  Sensory: No sensory deficit  Motor: No weakness  Additional Data:     Labs:  Results from last 7 days   Lab Units 12/01/22  0626 11/30/22  1816 11/30/22  0458 11/26/22  1154 11/26/22  0438 11/25/22  1152 11/25/22  0550   WBC Thousand/uL  --   --  13 89*   < > 11 48*  --  13 09*   HEMOGLOBIN g/dL 8 0*   < > 7 5*   < > 7 3*   < > 8 0*   HEMATOCRIT %  --   --  24 8*   < > 24 1*  --  26 9*   PLATELETS Thousands/uL  --   --  205   < > 177  --  223   BANDS PCT %  --   --   --   --   --   --  6   LYMPHO PCT %  --   --   --   --  8*  --  17   MONO PCT %  --   --   --   --  14*  --  7   EOS PCT %  --   --   --   --  0  --  2    < > = values in this interval not displayed       Results from last 7 days   Lab Units 12/01/22  0626   SODIUM mmol/L 141   POTASSIUM mmol/L 4 1   CHLORIDE mmol/L 100   CO2 mmol/L 35*   BUN mg/dL 56*   CREATININE mg/dL 1 81*   ANION GAP mmol/L 6   CALCIUM mg/dL 8 6   GLUCOSE RANDOM mg/dL 108     Results from last 7 days   Lab Units 11/28/22  0511   INR  1 65*     Results from last 7 days   Lab Units 12/01/22  0758 11/30/22  2112 11/30/22  1610 11/30/22  1138 11/30/22  0711 11/29/22  2105 11/29/22  1609 11/29/22  1050 11/29/22  0714 11/28/22  2110 11/28/22  1622 11/28/22  1123   POC GLUCOSE mg/dl 101 170* 232* 178* 99 143* 279* 161* 119 198* 231* 178*               Lines/Drains:  Invasive Devices     Peripheral Intravenous Line  Duration           Long-Dwell Peripheral IV (Midline) 22/76/66 Left Cephalic 5 days                      Imaging: Reviewed radiology reports from this admission including: chest xray, abdominal/pelvic CT and CT head    Recent Cultures (last 7 days):         Last 24 Hours Medication List:   Current Facility-Administered Medications   Medication Dose Route Frequency Provider Last Rate   • acetaminophen  650 mg Oral Q6H PRN Berenice Johnston MD     • atorvastatin  10 mg Oral Daily Berenice Johnston MD     • budesonide  0 5 mg Nebulization Q12H Berenice Johnston MD     • collagenase   Topical Daily Berenice Johnston MD     • formoterol  20 mcg Nebulization Q12H Berenice Johnston MD     • guaiFENesin  1,200 mg Oral BID Berenice Johnston MD     • hydrOXYzine HCL  50 mg Oral HS Berenice Johnston MD     • insulin lispro  2-12 Units Subcutaneous TID AC Berenice Johnston MD     • insulin lispro  2-12 Units Subcutaneous HS Berenice Johnston MD     • ipratropium  0 5 mg Nebulization TID Berenice Johnston MD     • levalbuterol  1 25 mg Nebulization TID Berenice Johnston MD     • levothyroxine  100 mcg Oral Early Morning Berenice Johnston MD     • melatonin  6 mg Oral HS Berenice Johnston MD     • metoprolol tartrate  50 mg Oral BID Berenice Johnston MD     • ondansetron  4 mg Intravenous Q8H PRN Berenice Johnston MD     • pantoprazole  40 mg Intravenous Q12H [facility] Berenice Johnston MD     • polyethylene glycol  17 g Oral Daily Berenice Johnston MD     • predniSONE  10 mg Oral Daily Pascale Bonilla MD     • senna  1 tablet Oral BID Berenice Johnston MD          Today, Patient Was Seen By: Travis Tsang DO    **Please Note: This note may have been constructed using a voice recognition system  **

## 2022-12-01 NOTE — ASSESSMENT & PLAN NOTE
· With acute exacerbation  · Continue scheduled xopenex, atrovent, pulmicort, perforomist    Plan  · Continue last day of prednisone taper  · Continue to monitor oxygen saturations  · Will continue to follow with Pulmonary recommendations

## 2022-12-01 NOTE — ASSESSMENT & PLAN NOTE
Lab Results   Component Value Date    HGBA1C 7 0 (H) 11/01/2022       Recent Labs     11/30/22  1138 11/30/22  1610 11/30/22  2112 12/01/22  0758   POCGLU 178* 232* 170* 101       Blood Sugar Average: Last 72 hrs:  (P) 835 [id number]     · ACHS glucose checks and SSI for goal glucose 140-180  · Sliding scale  · Hypoglycemic protocol

## 2022-12-01 NOTE — PLAN OF CARE
Problem: Potential for Falls  Goal: Patient will remain free of falls  Description: INTERVENTIONS:  - Educate patient/family on patient safety including physical limitations  - Instruct patient to call for assistance with activity   - Consult OT/PT to assist with strengthening/mobility   - Keep Call bell within reach  - Keep bed low and locked with side rails adjusted as appropriate  - Keep care items and personal belongings within reach  - Initiate and maintain comfort rounds  Problem: MOBILITY - ADULT  Goal: Maintains/Returns to pre admission functional level  Description: INTERVENTIONS:  - Perform BMAT or MOVE assessment daily    - Set and communicate daily mobility goal to care team and patient/family/caregiver     - Collaborate with rehabilitation services on mobility goals if consulted  - Out of bed for toileting  - Record patient progress and toleration of activity level   Outcome: Progressing     - Apply yellow socks and bracelet for high fall risk patients  - Consider moving patient to room near nurses station  Outcome: Progressing

## 2022-12-01 NOTE — ASSESSMENT & PLAN NOTE
Lab Results   Component Value Date    EGFR 29 12/01/2022    EGFR 31 11/30/2022    EGFR 35 11/29/2022    CREATININE 1 81 (H) 12/01/2022    CREATININE 1 72 (H) 11/30/2022    CREATININE 1 56 (H) 11/29/2022     · Baseline Cr: 1 8 - 2 4  · Currently at baseline    Plan:  · Continue to monitor I/O and renal indices  · Continue PRN diuresis for goal negative 500ml/24 hours  · Avoid hypotension  · Avoid nephrotoxins  · Monitor BMP

## 2022-12-01 NOTE — ASSESSMENT & PLAN NOTE
Recent Labs     11/30/22  0458 11/30/22  1816 12/01/22  0626   HGB 7 5* 7 9* 8 0*   MCV 97  --   --        Likely 2/2 acute blood loss anemia vs hemolysis  Patient with 3 episodes of melanotic stool on 11/24/22 with drop in Hgb required 2 units of prbc total  Hemoglobin overnight was found to be 6 9  Patient was given additional PRBC transfusion  Recent bowel movements overnight were reported to be black tarry stools  Hemolysis labs: reticulocyte count elevated, and LDH within normal limits  Repeat push enteroscopy was prematurely aborted due to respiratory instability  Colonoscopy was unable to yield good images due to inadequate bowel prep and inability to advance the scope past the hepatic flexure  Plan:  · Diet NPO, patient to complete third day of Glycolax bowel prep in anticipation for repeat colonoscopy  · Repeat colonoscopy tentatively scheduled for today or Friday  • Monitor H&H Q 12 hours  • Transfuse if Hb < 7  • Consent for transfusion obtained:  Yes   • Continue IV PPI BID  · Will continue to hold coumadin  · Follow-up with direct Suresh test and haptoglobin for evaluation of hemolysis

## 2022-12-01 NOTE — ASSESSMENT & PLAN NOTE
· In the setting of COPD/asthma with acute exacerbation and recent PNA   · At baseline, requires 4L NC, O2 requirement now at baseline  · S/p 5 day course abx for pneumonia  · CT C/A/P showed multifocal scattered patchy nodular opacities in RUL and possibly RLL, was treated for pneumonia with ceftriaxone this admission  · VBG on 11/28 showed a respiratory acidosis with appropriate compensation     Plan  · Continue BIPAP HS and PRN for SASHA/OHS, encourage compliance  · Continue scheduled nebs and steroids for COPD exacerbation  · Continue last day of prednisone taper  · Encourage cough, deep breathing, IS, ambulation with assistance  · Monitor oxygen requirements

## 2022-12-02 ENCOUNTER — APPOINTMENT (INPATIENT)
Dept: GASTROENTEROLOGY | Facility: HOSPITAL | Age: 62
End: 2022-12-02

## 2022-12-02 ENCOUNTER — ANESTHESIA (INPATIENT)
Dept: GASTROENTEROLOGY | Facility: HOSPITAL | Age: 62
End: 2022-12-02

## 2022-12-02 ENCOUNTER — ANESTHESIA EVENT (INPATIENT)
Dept: GASTROENTEROLOGY | Facility: HOSPITAL | Age: 62
End: 2022-12-02

## 2022-12-02 LAB
ANION GAP SERPL CALCULATED.3IONS-SCNC: 4 MMOL/L (ref 4–13)
BUN SERPL-MCNC: 42 MG/DL (ref 5–25)
CALCIUM SERPL-MCNC: 8 MG/DL (ref 8.4–10.2)
CHLORIDE SERPL-SCNC: 102 MMOL/L (ref 96–108)
CO2 SERPL-SCNC: 37 MMOL/L (ref 21–32)
CREAT SERPL-MCNC: 1.79 MG/DL (ref 0.6–1.3)
DAT POLY-SP REAG RBC QL: NEGATIVE
GFR SERPL CREATININE-BSD FRML MDRD: 29 ML/MIN/1.73SQ M
GLUCOSE SERPL-MCNC: 105 MG/DL (ref 65–140)
GLUCOSE SERPL-MCNC: 107 MG/DL (ref 65–140)
GLUCOSE SERPL-MCNC: 128 MG/DL (ref 65–140)
GLUCOSE SERPL-MCNC: 137 MG/DL (ref 65–140)
GLUCOSE SERPL-MCNC: 188 MG/DL (ref 65–140)
HGB BLD-MCNC: 7.2 G/DL (ref 11.5–15.4)
HGB BLD-MCNC: 7.9 G/DL (ref 11.5–15.4)
POTASSIUM SERPL-SCNC: 4.1 MMOL/L (ref 3.5–5.3)
SODIUM SERPL-SCNC: 143 MMOL/L (ref 135–147)

## 2022-12-02 PROCEDURE — 0DJD8ZZ INSPECTION OF LOWER INTESTINAL TRACT, VIA NATURAL OR ARTIFICIAL OPENING ENDOSCOPIC: ICD-10-PCS | Performed by: STUDENT IN AN ORGANIZED HEALTH CARE EDUCATION/TRAINING PROGRAM

## 2022-12-02 RX ORDER — PROPOFOL 10 MG/ML
INJECTION, EMULSION INTRAVENOUS CONTINUOUS PRN
Status: DISCONTINUED | OUTPATIENT
Start: 2022-12-02 | End: 2022-12-02

## 2022-12-02 RX ORDER — SODIUM CHLORIDE, SODIUM LACTATE, POTASSIUM CHLORIDE, CALCIUM CHLORIDE 600; 310; 30; 20 MG/100ML; MG/100ML; MG/100ML; MG/100ML
INJECTION, SOLUTION INTRAVENOUS CONTINUOUS PRN
Status: DISCONTINUED | OUTPATIENT
Start: 2022-12-02 | End: 2022-12-02

## 2022-12-02 RX ORDER — SIMETHICONE 20 MG/.3ML
EMULSION ORAL AS NEEDED
Status: COMPLETED | OUTPATIENT
Start: 2022-12-02 | End: 2022-12-02

## 2022-12-02 RX ORDER — PROPOFOL 10 MG/ML
INJECTION, EMULSION INTRAVENOUS AS NEEDED
Status: DISCONTINUED | OUTPATIENT
Start: 2022-12-02 | End: 2022-12-02

## 2022-12-02 RX ORDER — ECHINACEA PURPUREA EXTRACT 125 MG
1 TABLET ORAL
Status: DISCONTINUED | OUTPATIENT
Start: 2022-12-02 | End: 2022-12-03 | Stop reason: HOSPADM

## 2022-12-02 RX ADMIN — INSULIN LISPRO 2 UNITS: 100 INJECTION, SOLUTION INTRAVENOUS; SUBCUTANEOUS at 22:46

## 2022-12-02 RX ADMIN — PROPOFOL 50 MG: 10 INJECTION, EMULSION INTRAVENOUS at 13:05

## 2022-12-02 RX ADMIN — POLYETHYLENE GLYCOL 3350 17 G: 17 POWDER, FOR SOLUTION ORAL at 10:16

## 2022-12-02 RX ADMIN — PREDNISONE 10 MG: 10 TABLET ORAL at 08:43

## 2022-12-02 RX ADMIN — HYDROXYZINE HYDROCHLORIDE 50 MG: 50 TABLET, FILM COATED ORAL at 22:44

## 2022-12-02 RX ADMIN — BUDESONIDE 0.5 MG: 0.5 INHALANT ORAL at 07:35

## 2022-12-02 RX ADMIN — SALINE NASAL SPRAY 1 SPRAY: 1.5 SOLUTION NASAL at 11:29

## 2022-12-02 RX ADMIN — ATORVASTATIN CALCIUM 10 MG: 10 TABLET, FILM COATED ORAL at 08:43

## 2022-12-02 RX ADMIN — IPRATROPIUM BROMIDE 0.5 MG: 0.5 SOLUTION RESPIRATORY (INHALATION) at 21:53

## 2022-12-02 RX ADMIN — LEVALBUTEROL HYDROCHLORIDE 1.25 MG: 1.25 SOLUTION, CONCENTRATE RESPIRATORY (INHALATION) at 21:53

## 2022-12-02 RX ADMIN — IPRATROPIUM BROMIDE 0.5 MG: 0.5 SOLUTION RESPIRATORY (INHALATION) at 07:35

## 2022-12-02 RX ADMIN — SENNOSIDES 8.6 MG: 8.6 TABLET, FILM COATED ORAL at 08:43

## 2022-12-02 RX ADMIN — SODIUM CHLORIDE, SODIUM LACTATE, POTASSIUM CHLORIDE, AND CALCIUM CHLORIDE: .6; .31; .03; .02 INJECTION, SOLUTION INTRAVENOUS at 12:48

## 2022-12-02 RX ADMIN — PANTOPRAZOLE SODIUM 40 MG: 40 INJECTION, POWDER, FOR SOLUTION INTRAVENOUS at 08:43

## 2022-12-02 RX ADMIN — FORMOTEROL FUMARATE DIHYDRATE 20 MCG: 20 SOLUTION RESPIRATORY (INHALATION) at 21:53

## 2022-12-02 RX ADMIN — COLLAGENASE SANTYL: 250 OINTMENT TOPICAL at 11:30

## 2022-12-02 RX ADMIN — PROPOFOL 40 MG: 10 INJECTION, EMULSION INTRAVENOUS at 13:12

## 2022-12-02 RX ADMIN — GUAIFENESIN 1200 MG: 600 TABLET, EXTENDED RELEASE ORAL at 16:40

## 2022-12-02 RX ADMIN — GUAIFENESIN 1200 MG: 600 TABLET, EXTENDED RELEASE ORAL at 08:43

## 2022-12-02 RX ADMIN — PROPOFOL 20 MCG/KG/MIN: 10 INJECTION, EMULSION INTRAVENOUS at 12:56

## 2022-12-02 RX ADMIN — Medication 6 MG: at 22:43

## 2022-12-02 RX ADMIN — PHENYLEPHRINE HYDROCHLORIDE 60 MCG/MIN: 50 INJECTION INTRAVENOUS at 13:48

## 2022-12-02 RX ADMIN — PANTOPRAZOLE SODIUM 40 MG: 40 INJECTION, POWDER, FOR SOLUTION INTRAVENOUS at 22:44

## 2022-12-02 RX ADMIN — LEVALBUTEROL HYDROCHLORIDE 1.25 MG: 1.25 SOLUTION, CONCENTRATE RESPIRATORY (INHALATION) at 07:35

## 2022-12-02 RX ADMIN — FORMOTEROL FUMARATE DIHYDRATE 20 MCG: 20 SOLUTION RESPIRATORY (INHALATION) at 07:35

## 2022-12-02 RX ADMIN — METOPROLOL TARTRATE 50 MG: 50 TABLET, FILM COATED ORAL at 22:44

## 2022-12-02 RX ADMIN — LEVOTHYROXINE SODIUM 100 MCG: 100 TABLET ORAL at 05:25

## 2022-12-02 RX ADMIN — BUDESONIDE 0.5 MG: 0.5 INHALANT ORAL at 21:53

## 2022-12-02 RX ADMIN — Medication 40 MG: at 13:43

## 2022-12-02 NOTE — ASSESSMENT & PLAN NOTE
Lab Results   Component Value Date    EGFR 29 12/02/2022    EGFR 29 12/01/2022    EGFR 31 11/30/2022    CREATININE 1 79 (H) 12/02/2022    CREATININE 1 81 (H) 12/01/2022    CREATININE 1 72 (H) 11/30/2022     · Baseline Cr: 1 8 - 2 4  · Currently at baseline    Plan:  · Continue to monitor I/O and renal indices  · Continue PRN diuresis for goal negative 500ml/24 hours  · Avoid hypotension  · Avoid nephrotoxins  · Monitor BMP

## 2022-12-02 NOTE — ASSESSMENT & PLAN NOTE
· With acute exacerbation  · Continue scheduled xopenex, atrovent, pulmicort, perforomist    Plan  · Completed prednisone taper  · Will start Anoro Ellipta inhaler 1 puff daily  · Patient did not qualify for home BiPAP  She will require continuous oxygen at 2 L at rest and 4 L at bedtime    · Outpatient follow up with pulmonary

## 2022-12-02 NOTE — ASSESSMENT & PLAN NOTE
· In the setting of COPD/asthma with acute exacerbation and recent PNA   · At baseline, requires 4L NC, O2 requirement now at baseline  · S/p 5 day course abx for pneumonia  · CT C/A/P showed multifocal scattered patchy nodular opacities in RUL and possibly RLL, was treated for pneumonia with ceftriaxone this admission  · VBG on 11/28 showed a respiratory acidosis with appropriate compensation     Plan  · Patient unfortunately did not qualify for home BiPAP  · Completed prednisone taper  · Patient will start using Anoro Ellipta inhaler 1 puff daily  · Encourage cough, deep breathing, IS, ambulation with assistance  · Monitor oxygen requirements  She will be maintained on 2 liters/minute at rest and 4 liters/minute at bedtime    · Close outpatient follow-up with pulmonology

## 2022-12-02 NOTE — ASSESSMENT & PLAN NOTE
Lab Results   Component Value Date    EGFR 29 12/02/2022    EGFR 29 12/01/2022    EGFR 31 11/30/2022    CREATININE 1 79 (H) 12/02/2022    CREATININE 1 81 (H) 12/01/2022    CREATININE 1 72 (H) 11/30/2022     · Baseline Cr: 1 8 - 2 4  · Currently at baseline    Plan:  · Continue to monitor I/O and renal indices  · Continue PRN diuresis for goal negative 500ml/24 hours  · Avoid hypotension  · Avoid nephrotoxins

## 2022-12-02 NOTE — PHYSICAL THERAPY NOTE
12/02/22 1609   Note Type   Note Type Cancelled Session   Cancel Reasons Refusal  ("no way, I'm not doing therapy today - I just got back from a colonoscopy")   Licensure   [place] License Number  Genevieve Casillas [id number]

## 2022-12-02 NOTE — ASSESSMENT & PLAN NOTE
Lab Results   Component Value Date    HGBA1C 7 0 (H) 11/01/2022       Recent Labs     12/01/22  1117 12/01/22  1541 12/01/22 2052 12/02/22  0713   POCGLU 111 147* 179* 107       Blood Sugar Average: Last 72 hrs:  (P) 128 [id number]     · ACHS glucose checks and SSI for goal glucose 140-180  · Sliding scale  · Hypoglycemic protocol

## 2022-12-02 NOTE — ASSESSMENT & PLAN NOTE
Recent Labs     12/02/22  0459 12/02/22  1842 12/03/22  0549   HGB 7 2* 7 9* 7 6*       Likely 2/2 acute blood loss anemia vs hemolysis  Patient with 3 episodes of melanotic stool on 11/24/22 with drop in Hgb required 2 units of prbc total  Hemoglobin overnight was found to be 6 9  Patient was given additional PRBC transfusion  Recent bowel movements overnight were reported to be black tarry stools  Hemolysis labs: reticulocyte count elevated, and LDH within normal limits  Repeat push enteroscopy was prematurely aborted due to respiratory instability  Colonoscopy was unable to yield good images due to inadequate bowel prep and inability to advance the scope past the hepatic flexure  Repeat colonoscopy unremarkable for any lesions and evidence of bleeding  Direct Suresh test negative    Plan:  • Monitor hemoglobin as outpatient  • Transfuse if Hb < 7  • Consent for transfusion obtained: Yes   • Continue PO PPI daily  · Resume home coumadin  · Ambulatory referral to Gastroenterology  Patient will likely need video capsule endoscopy as outpatient if there is recurrent bleeding and refractory anemia  · Repeat colonoscopy in 5 years

## 2022-12-02 NOTE — ASSESSMENT & PLAN NOTE
· In the setting of COPD/asthma with acute exacerbation and recent PNA   · At baseline, requires 4L NC, O2 requirement now at baseline  · S/p 5 day course abx for pneumonia  · CT C/A/P showed multifocal scattered patchy nodular opacities in RUL and possibly RLL, was treated for pneumonia with ceftriaxone this admission  · VBG on 11/28 showed a respiratory acidosis with appropriate compensation     Plan  · Continue BIPAP HS and PRN for SASHA/OHS, encourage compliance  · Continue scheduled nebs and steroids for COPD exacerbation  · Completed prednisone taper  · Encourage cough, deep breathing, IS, ambulation with assistance  · Monitor oxygen requirements  · Pulmonary will set up bipap qualification for home use

## 2022-12-02 NOTE — CASE MANAGEMENT
Case Management Progress Note    Patient name Cintia Knight  Location S /S -01 MRN [id number]  : 1960 Date 2022       LOS (days): 11  Geometric Mean LOS (GMLOS) (days): 8 50  Days to GMLOS:-2 6        OBJECTIVE:        Current admission status: Inpatient  Preferred Pharmacy:   [id number], [address] [place] [address],[address]  [address]  Phone: [phone] Fax: [phone]    Primary Care Provider: Juma Gale MD    Primary Insurance: MEDICARE  Secondary Insurance: [address] [place] [address]    PROGRESS NOTE:    CM confirmed with patient and SO again that they do not need any other equipments other than a BIPAP from [address], if approved  Patient awaiting qualifying test for Bipap - CM will follow up tomorrow if completed as patient is anticipated to d/c tomorrow most likely

## 2022-12-02 NOTE — PROGRESS NOTES
[facility]  Progress Note - Gareth Munson 1960, 60 y o  female MRN: [id number]  Unit/Bed#: S -01 Encounter: [id number]  Primary Care Provider: Ketan Amador MD   Date and time admitted to hospital: 11/21/2022 12:58 PM    * Acute blood loss anemia  Assessment & Plan  Recent Labs     11/30/22  0458 11/30/22  1816 12/01/22  0626 12/01/22  1852 12/02/22  0459   HGB 7 5*   < > 8 0* 7 6* 7 2*   MCV 97  --   --   --   --     < > = values in this interval not displayed  Likely 2/2 acute blood loss anemia vs hemolysis  Patient with 3 episodes of melanotic stool on 11/24/22 with drop in Hgb required 2 units of prbc total  Hemoglobin overnight was found to be 6 9  Patient was given additional PRBC transfusion  Recent bowel movements overnight were reported to be black tarry stools  Hemolysis labs: reticulocyte count elevated, and LDH within normal limits  Repeat push enteroscopy was prematurely aborted due to respiratory instability  Colonoscopy was unable to yield good images due to inadequate bowel prep and inability to advance the scope past the hepatic flexure  Plan:  · Diet NPO, patient completed third day of Glycolax bowel prep in anticipation for repeat colonoscopy  · Repeat colonoscopy for today  Follow up with results  • Monitor H&H Q 12 hours  • Transfuse if Hb < 7  • Consent for transfusion obtained:  Yes   • Continue IV PPI BID  · Will continue to hold coumadin  · Follow-up with direct Suresh test and haptoglobin for evaluation of hemolysis        Acute on chronic respiratory failure with hypoxia and hypercapnia (HCC)  Assessment & Plan  · In the setting of COPD/asthma with acute exacerbation and recent PNA   · At baseline, requires 4L NC, O2 requirement now at baseline  · S/p 5 day course abx for pneumonia  · CT C/A/P showed multifocal scattered patchy nodular opacities in RUL and possibly RLL, was treated for pneumonia with ceftriaxone this admission  · VBG on 11/28 showed a respiratory acidosis with appropriate compensation     Plan  · Continue BIPAP HS and PRN for SASHA/OHS, encourage compliance  · Continue scheduled nebs and steroids for COPD exacerbation  · Completed prednisone taper  · Encourage cough, deep breathing, IS, ambulation with assistance  · Monitor oxygen requirements  · Pulmonary will set up bipap qualification for home use      Panniculitis  Assessment & Plan  · 11/20 CT C/A/P: acute panniculitis, not appreciated on physical exam  · Monitor off abx    Acute metabolic encephalopathy  Assessment & Plan  · Likely in the setting of acute on chronic hypoxic and hypercapnic respiratory failure, now improving    Plan  · Continue BIPAP PRN and HS  · Regulate sleep/wake  · Delirium precautions  · Frequent neuro checks and reorientation     Bilateral pleural effusion  Assessment & Plan  · S/p diuresis with 40mg IV lasix daily    Plan  · Would continue PRN diuresis for goal -500ml over 24 hours     Pneumonia due to infectious organism  Assessment & Plan  · Diagnosed as OP  · S/p 5 days abx as OP and continued on admssion  · Patient remains afebrile, without leukocytosis, procal negative   · Monitor off abx     Leg wound, left, initial encounter  Assessment & Plan  · Wound care nurse following    Diabetes mellitus type 2 in obese [facility])  Assessment & Plan  Lab Results   Component Value Date    HGBA1C 7 0 (H) 11/01/2022       Recent Labs     12/01/22  1117 12/01/22  1541 12/01/22  2052 12/02/22  0713   POCGLU 111 147* 179* 107       Blood Sugar Average: Last 72 hrs:  (P) 002 [id number]     · ACHS glucose checks and SSI for goal glucose 140-180  · Sliding scale  · Hypoglycemic protocol    Morbid (severe) obesity due to excess calories (HCC)  Assessment & Plan  · Lifestyle modification    Anxiety  Assessment & Plan  · Continue Atarax 50 mg qHS    GERD (gastroesophageal reflux disease)  Assessment & Plan  · Continue IV Protonix 40 mg q 12 hours    Hypothyroid  Assessment & Plan  · Continue home levothyroxine    COPD with asthma ([address] )  Assessment & Plan  · With acute exacerbation  · Continue scheduled xopenex, atrovent, pulmicort, perforomist    Plan  · Completed prednisone taper  · Continue to monitor oxygen saturations  · Will continue to follow with Pulmonary recommendations  · Pulmonary will begin process of setting up bipap qualification use at home  · Outpatient follow up with pulmonary      Paroxysmal atrial fibrillation [facility])  Assessment & Plan  Recent Labs     11/25/22  1456 11/26/22  0438 11/27/22  0432   INR 2 08* 2 19* 1 68*     · Currently rate controlled  · Continue home metoprolol    Plan:  · Continue to hold home coumadin with supratherapeutic INR  · Monitor for signs of bleeding with BMs  · Restart Coumadin when in therapeutic range vs reversal w/ FFP/PCC as needed if bleeding and drop in Hgb recurs        Dyslipidemia  Assessment & Plan  · Continue home statin    Chronic kidney disease, stage III (moderate) [facility])  Assessment & Plan  Lab Results   Component Value Date    EGFR 29 12/02/2022    EGFR 29 12/01/2022    EGFR 31 11/30/2022    CREATININE 1 79 (H) 12/02/2022    CREATININE 1 81 (H) 12/01/2022    CREATININE 1 72 (H) 11/30/2022     · Baseline Cr: 1 8 - 2 4  · Currently at baseline    Plan:  · Continue to monitor I/O and renal indices  · Continue PRN diuresis for goal negative 500ml/24 hours  · Avoid hypotension  · Avoid nephrotoxins  · Monitor BMP      VTE Pharmacologic Prophylaxis: VTE Score: 9 High Risk (Score >/= 5) - Pharmacological DVT Prophylaxis Contraindicated  Sequential Compression Devices Ordered  Patient Centered Rounds: I performed bedside rounds with nursing staff today  Discussions with Specialists or Other Care Team Provider: GI, pulmonary, attending, senior resident    Education and Discussions with Family / Patient: Updated  (significant other) via phone      Current Length of Stay: 11 day(s)  Current Patient Status: Inpatient   Discharge Plan: Anticipate discharge tomorrow to rehab facility  Code Status: Level 1 - Full Code    Subjective:   No acute events overnight  Patient seen and examined at the bedside this morning  She states she finished all of her bowel prep yesterday and continues to have black tarry stool this morning  She denies any worsening shortness of breath and continues to complain of congestion in her chest  She is eager to get the colonoscopy done today and understands that she will need BiPAP arranged for her to use at rehab and home  Objective:     Vitals:   Temp (24hrs), Av 6 °F (36 4 °C), Min:97 4 °F (36 3 °C), Max:97 7 °F (36 5 °C)    Temp:  [97 4 °F (36 3 °C)-97 7 °F (36 5 °C)] 97 7 °F (36 5 °C)  HR:  [77-87] 79  Resp:  [16-18] 16  BP: ()/(50-58) 98/58  SpO2:  [92 %-99 %] 97 %  Body mass index is 54 87 kg/m²  Input and Output Summary (last 24 hours):   No intake or output data in the 24 hours ending 22 1042    Physical Exam:   Physical Exam  Vitals and nursing note reviewed  Constitutional:       General: She is not in acute distress  Appearance: She is morbidly obese  She is ill-appearing  HENT:      Head: Normocephalic and atraumatic  Mouth/Throat:      Mouth: Mucous membranes are moist    Eyes:      Pupils: Pupils are equal, round, and reactive to light  Cardiovascular:      Rate and Rhythm: Normal rate  Rhythm irregular  Pulses: Normal pulses  Heart sounds: Normal heart sounds  No murmur heard  No friction rub  No gallop  Pulmonary:      Effort: Pulmonary effort is normal  No respiratory distress  Breath sounds: No wheezing, rhonchi or rales  Comments: Limited d/t body habitus  Diminished    Abdominal:      General: Bowel sounds are normal  There is no distension  Palpations: Abdomen is soft  Tenderness: There is abdominal tenderness        Comments: Mild generalized abdominal tenderness Musculoskeletal:         General: Swelling present  Normal range of motion  Cervical back: Neck supple  Right lower leg: Edema present  Left lower leg: Edema present  Skin:     General: Skin is warm and dry  Capillary Refill: Capillary refill takes less than 2 seconds  Neurological:      General: No focal deficit present  Mental Status: She is alert and oriented to person, place, and time  Cranial Nerves: No cranial nerve deficit  Sensory: No sensory deficit  Motor: No weakness  Psychiatric:         Behavior: Behavior is cooperative  Additional Data:     Labs:  Results from last 7 days   Lab Units 12/02/22  0459 11/30/22  1816 11/30/22  0458 11/26/22  1154 11/26/22  0438   WBC Thousand/uL  --   --  13 89*   < > 11 48*   HEMOGLOBIN g/dL 7 2*   < > 7 5*   < > 7 3*   HEMATOCRIT %  --   --  24 8*   < > 24 1*   PLATELETS Thousands/uL  --   --  205   < > 177   LYMPHO PCT %  --   --   --   --  8*   MONO PCT %  --   --   --   --  14*   EOS PCT %  --   --   --   --  0    < > = values in this interval not displayed       Results from last 7 days   Lab Units 12/02/22  0459   SODIUM mmol/L 143   POTASSIUM mmol/L 4 1   CHLORIDE mmol/L 102   CO2 mmol/L 37*   BUN mg/dL 42*   CREATININE mg/dL 1 79*   ANION GAP mmol/L 4   CALCIUM mg/dL 8 0*   GLUCOSE RANDOM mg/dL 105     Results from last 7 days   Lab Units 11/28/22  0511   INR  1 65*     Results from last 7 days   Lab Units 12/02/22  0713 12/01/22 2052 12/01/22  1541 12/01/22  1117 12/01/22  0758 11/30/22  2112 11/30/22  1610 11/30/22  1138 11/30/22  0711 11/29/22  2105 11/29/22  1609 11/29/22  1050   POC GLUCOSE mg/dl 107 179* 147* 111 101 170* 232* 178* 99 143* 279* 161*               Lines/Drains:  Invasive Devices     Peripheral Intravenous Line  Duration           Long-Dwell Peripheral IV (Midline) 89/55/71 Left Cephalic 6 days                      Imaging: Reviewed radiology reports from this admission including: chest xray, abdominal/pelvic CT and CT head    Recent Cultures (last 7 days):         Last 24 Hours Medication List:   Current Facility-Administered Medications   Medication Dose Route Frequency Provider Last Rate   • acetaminophen  650 mg Oral Q6H PRN Berenice Gaspar MD     • atorvastatin  10 mg Oral Daily Berenice Gaspar MD     • budesonide  0 5 mg Nebulization Q12H Berenice Gaspar MD     • collagenase   Topical Daily Berenice Gaspar MD     • formoterol  20 mcg Nebulization Q12H Berenice Gaspar MD     • guaiFENesin  1,200 mg Oral BID Berenice Gaspar MD     • hydrOXYzine HCL  50 mg Oral HS Berenice Gaspar MD     • insulin lispro  2-12 Units Subcutaneous TID AC Berenice Gaspar MD     • insulin lispro  2-12 Units Subcutaneous HS Berenice Gaspar MD     • ipratropium  0 5 mg Nebulization TID Berenice Gaspar MD     • levalbuterol  1 25 mg Nebulization TID Berenice Gaspar MD     • levothyroxine  100 mcg Oral Early Morning Berenice Gaspar MD     • melatonin  6 mg Oral HS Berenice Gaspar MD     • metoprolol tartrate  50 mg Oral BID Berenice Gaspar MD     • ondansetron  4 mg Intravenous Q8H PRN Berenice Gaspar MD     • pantoprazole  40 mg Intravenous Q12H [facility] Berenice Gaspar MD     • polyethylene glycol  17 g Oral Daily Berenice Gaspar MD     • predniSONE  10 mg Oral Daily Sandoval Dupree MD     • senna  1 tablet Oral BID Berenice Gaspar MD     • sodium chloride  1 spray Each Nare Q1H PRN Haylie Ibarra MD          Today, Patient Was Seen By: David Mohr DO    **Please Note: This note may have been constructed using a voice recognition system  **

## 2022-12-02 NOTE — ASSESSMENT & PLAN NOTE
Lab Results   Component Value Date    HGBA1C 7 0 (H) 11/01/2022       Recent Labs     12/02/22  1712 12/02/22  2053 12/03/22  0759 12/03/22  1055   POCGLU 128 188* 114 198*       Blood Sugar Average: Last 72 hrs:  (P) 149 [id number]     · ACHS glucose checks and SSI for goal glucose 140-180  · Sliding scale  · Outpatient follow-up with PCP

## 2022-12-02 NOTE — ANESTHESIA POSTPROCEDURE EVALUATION
Post-Op Assessment Note    CV Status:  Stable  Pain Score: 0    Pain management: adequate     Mental Status:  Alert and awake   Hydration Status:  Euvolemic   PONV Controlled:  Controlled   Airway Patency:  Patent      Post Op Vitals Reviewed: Yes      Staff: CRNA, Anesthesiologist         No notable events documented      BP   103/59   Temp  98   Pulse  81   Resp   17   SpO2   99

## 2022-12-02 NOTE — ANESTHESIA PREPROCEDURE EVALUATION
Procedure:  COLONOSCOPY    Relevant Problems   CARDIO   (+) Paroxysmal atrial fibrillation (HCC)      ENDO   (+) Diabetes mellitus type 2 in obese (HCC)   (+) Hyperparathyroidism (HCC)   (+) Hypothyroid   (+) Secondary hyperparathyroidism of renal origin ([facility] [address] )      GI/HEPATIC   (+) GERD (gastroesophageal reflux disease)      /RENAL   (+) Acute renal failure superimposed on chronic kidney disease (HCC)   (+) Anemia due to stage 3b chronic kidney disease (HCC)   (+) Chronic kidney disease, stage III (moderate) (HCC)   (+) Diabetic nephropathy associated with type 2 diabetes mellitus (HCC)   (+) Hypertensive chronic kidney disease with stage 1 through stage 4 chronic kidney disease, or unspecified chronic kidney disease      GYN   (+) History of hysterectomy      HEMATOLOGY   (+) Acute blood loss anemia   (+) Anemia due to stage 3b chronic kidney disease (HCC)      NEURO/PSYCH   (+) Anxiety   (+) Depression, recurrent (HCC)   (+) Diabetic neuropathy (HCC)   (+) Diabetic polyneuropathy associated with type 2 diabetes mellitus (HCC)      PULMONARY   (+) Acute on chronic respiratory failure with hypoxia and hypercapnia (HCC)   (+) Bilateral pleural effusion   (+) COPD with asthma (HCC)   (+) Pneumonia due to infectious organism   (+) SOB (shortness of breath)      Other   (+) Dyslipidemia      Findings    Left Ventricle Left ventricular cavity size is normal  Wall thickness is mildly increased  There is concentric remodeling  Wall motion and left ventricular ejection fraction cannot be accurately assessed  Left ventricular systolic function appears normal in limited views  Unable to assess diastolic function: atrial fibrillation  Right Ventricle Right ventricular cavity size is dilated  Systolic function is normal  Wall thickness is increased  Left Atrium The atrium is dilated  Right Atrium The atrium is dilated  Aortic Valve The aortic valve was not well visualized  There is no evidence of regurgitation   The aortic valve has no significant stenosis  Mitral Valve There is no evidence of regurgitation  There is no evidence of stenosis  The mitral valve has normal structure and normal function  Tricuspid Valve The tricuspid valve was not well visualized  There is mild regurgitation  There is no evidence of stenosis  The estimated right ventricular systolic pressure at least moderately elevated  Pulmonic Valve The pulmonic valve was not well visualized  There is no evidence of regurgitation  There is no evidence of stenosis  Ascending Aorta The sinus of Valsalva is normal in size  The ascending aorta is normal in size  IVC/SVC The right atrial pressure is estimated at 15 0 mmHg  The inferior vena cava is dilated  Respirophasic changes were blunted (less than 50% variation)  Pericardium There is no pericardial effusion  The pericardium is normal in appearance       Physical Exam    Airway    Mallampati score: III  TM Distance: <3 FB  Neck ROM: full     Dental   Comment: Chipped teeth  No loose,     Cardiovascular  Rhythm: irregular, Rate: normal,     Pulmonary  Decreased breath sounds,     Other Findings        Anesthesia Plan  ASA Score- 4     Anesthesia Type- IV sedation with anesthesia with ASA Monitors  Additional Monitors:   Airway Plan:           Plan Factors-Exercise tolerance (METS): <4 METS  Chart reviewed  EKG reviewed  Existing labs reviewed  Patient summary reviewed  Patient is not a current smoker  Induction-     Postoperative Plan-     Informed Consent- Anesthetic plan and risks discussed with patient  I personally reviewed this patient with the CRNA  Discussed and agreed on the Anesthesia Plan with the CRNA  Sean Salazar

## 2022-12-02 NOTE — ASSESSMENT & PLAN NOTE
· With acute exacerbation  · Continue scheduled xopenex, atrovent, pulmicort, perforomist    Plan  · Completed prednisone taper  · Continue to monitor oxygen saturations  · Will continue to follow with Pulmonary recommendations  · Pulmonary will begin process of setting up bipap qualification use at home    · Outpatient follow up with pulmonary

## 2022-12-02 NOTE — ASSESSMENT & PLAN NOTE
Recent Labs     11/25/22  1456 11/26/22  0438 11/27/22  0432   INR 2 08* 2 19* 1 68*     · Currently rate controlled  · Continue home metoprolol    Plan:  · Continue home coumadin

## 2022-12-02 NOTE — PROGRESS NOTES
Progress Note - Palliative & Supportive Care  Baylee Harris  60 y o   female  S /S -01   MRN: [id number]  Encounter: [id number]     Assessment  Acute on chronic respiratory failure  Acute metabolic encephalopathy  Pneumonia  Bilateral pleural effusion  COPD  Asthma  CKDIII  Morbid obesity  Anxiety  Goals of care counseling     Goals:  Level 1 code status  Goals of care and code status confirmed today  • Disease focused care with no limits placed  • Patient and Lisa Rock adamantly refuse STR  Want her discharged home when medically stable  • Will continue discussions regarding [address] as patient's clinical presentation evolves         Plan:  Symptom management:  • Will defer symptom management to the ICU team at this time  Patient takes 0 5mg of ativan every night @ hs  Discussed restarting this medication with ICU Team which they will consider  24 Hour History  Chart reviewed before visit        Current pain location(s): ***  Pain Scale:   ***  PRN Use of Pain Medications: ***  24 hour Total OME for ***: ***      Review of Systems  ***    Medications    Current Facility-Administered Medications:   •  acetaminophen (TYLENOL) tablet 650 mg, 650 mg, Oral, Q6H PRN, Berenice Liang MD  •  atorvastatin (LIPITOR) tablet 10 mg, 10 mg, Oral, Daily, Berenice Liang MD, 10 mg at 12/01/22 0836  •  budesonide (PULMICORT) inhalation solution 0 5 mg, 0 5 mg, Nebulization, Q12H, Berenice Liang MD, 0 5 mg at 12/02/22 9132  •  collagenase (SANTYL) ointment, , Topical, Daily, Berenice Liang MD, Given at 12/01/22 2596  •  formoterol (PERFOROMIST) nebulizer solution 20 mcg, 20 mcg, Nebulization, Q12H, Berenice Liang MD, 20 mcg at 12/02/22 0735  •  guaiFENesin (MUCINEX) 12 hr tablet 1,200 mg, 1,200 mg, Oral, BID, Berenice Liang MD, 1,200 mg at 12/01/22 1845  •  hydrOXYzine HCL (ATARAX) tablet 50 mg, 50 mg, Oral, HS, Berenice Liang MD, 50 mg at 12/01/22 2126  •  insulin lispro (HumaLOG) 100 units/mL subcutaneous injection 2-12 Units, 2-12 Units, Subcutaneous, TID AC, 4 Units at 11/30/22 1707 **AND** Fingerstick Glucose (POCT), , , TID AC, Berenice Tubbs MD  •  insulin lispro (HumaLOG) 100 units/mL subcutaneous injection 2-12 Units, 2-12 Units, Subcutaneous, HS, Berenice Tubbs MD, 2 Units at 12/01/22 2126  •  ipratropium (ATROVENT) 0 02 % inhalation solution 0 5 mg, 0 5 mg, Nebulization, TID, Berenice Tubbs MD, 0 5 mg at 12/02/22 1922  •  levalbuterol (XOPENEX) inhalation solution 1 25 mg, 1 25 mg, Nebulization, TID, Berenice Tubbs MD, 1 25 mg at 12/02/22 8157  •  levothyroxine tablet 100 mcg, 100 mcg, Oral, Early Morning, Berenice Tubbs MD, 100 mcg at 12/02/22 2462  •  melatonin tablet 6 mg, 6 mg, Oral, HS, Berenice Tubbs MD, 6 mg at 12/01/22 2126  •  metoprolol tartrate (LOPRESSOR) tablet 50 mg, 50 mg, Oral, BID, Berenice Tubbs MD, 50 mg at 12/01/22 2126  •  ondansetron [facility]) injection 4 mg, 4 mg, Intravenous, Q8H PRN, Berenice Tubbs MD, 4 mg at 11/24/22 0137  •  pantoprazole (PROTONIX) injection 40 mg, 40 mg, Intravenous, Q12H [facility], Berenice Tubbs MD, 40 mg at 12/01/22 2126  •  polyethylene glycol (MIRALAX) packet 17 g, 17 g, Oral, Daily, Berenice Tubbs MD, 17 g at 12/01/22 5997  •  predniSONE tablet 10 mg, 10 mg, Oral, Daily, Benson Wall MD, 10 mg at 12/01/22 1428  •  senna (SENOKOT) tablet 8 6 mg, 1 tablet, Oral, BID, Berenice Tubbs MD, 8 6 mg at 12/01/22 0836    Objective  BP 92/50   Pulse 78   Temp 97 7 °F (36 5 °C)   Resp 16   Ht 5' 4" (1 626 m)   Wt (!) 145 kg (319 lb 10 7 oz)   SpO2 97%   BMI 54 87 kg/m²   Physical Exam:   Constitutional: Appears well-developed and well-nourished  Comfortable and in no acute distress  Head: Normocephalic and atraumatic  Eyes: EOM are normal  No ocular discharge  No scleral icterus     Neck: no visible adenopathy or masses  Cardiac: Normal rate and rhythm, normal pulses  Respiratory: Effort normal  No stridor  No respiratory distress  No cough  Gastrointestinal: No abdominal distension  Musculoskeletal: No edema  Neurological: Alert, oriented and appropriately conversant  Skin: Dry, no diaphoresis  Psychiatric: Displays a normal mood and affect  Behavior, judgement and thought content appear normal      Lab Results: {[facility] PALLIATIVE CARE LABS:96251}  Imaging Studies: I have personally reviewed pertinent reports  EKG, Pathology, and Other Studies: I have personally reviewed pertinent reports  Counseling / Coordination of Care  Total floor / unit time spent today {NUMBERS; 0-45 BY 5:63840} minutes  Greater than 50% of total time was spent with the patient and / or family counseling and / or coordinating of care  A description of the counseling / coordination of care: Chart reviewed, ***  provided medical updates, determined goals of care, discussed palliative care and symptom management, discussed code status, discussed comfort care and hospice, determined competency and POA/HCA, determined social/family support, provided psychosocial support  Reviewed with SLIM, ICU team, RN and CM  {Reason why note was not shared:[id number]}    James Rojas, STEPHANIE, CRNP, [facility]  Palliative & Supportive Care    Portions of this document may have been created using dictation software and as such some "sound alike" terms may have been generated by the system  Do not hesitate to contact me with any questions or clarifications

## 2022-12-03 ENCOUNTER — HOME CARE VISIT (OUTPATIENT)
Dept: HOME HEALTH SERVICES | Facility: HOME HEALTHCARE | Age: 62
End: 2022-12-03

## 2022-12-03 VITALS
SYSTOLIC BLOOD PRESSURE: 102 MMHG | TEMPERATURE: 98 F | HEART RATE: 88 BPM | DIASTOLIC BLOOD PRESSURE: 68 MMHG | RESPIRATION RATE: 18 BRPM | BODY MASS INDEX: 50.02 KG/M2 | HEIGHT: 64 IN | OXYGEN SATURATION: 95 % | WEIGHT: 293 LBS

## 2022-12-03 PROBLEM — G93.41 ACUTE METABOLIC ENCEPHALOPATHY: Status: RESOLVED | Noted: 2022-11-21 | Resolved: 2022-12-03

## 2022-12-03 LAB
ARTERIAL PATENCY WRIST A: YES
BASE EXCESS BLDA CALC-SCNC: 6.4 MMOL/L
GLUCOSE SERPL-MCNC: 114 MG/DL (ref 65–140)
GLUCOSE SERPL-MCNC: 198 MG/DL (ref 65–140)
HCO3 BLDA-SCNC: 31.8 MMOL/L (ref 22–28)
HGB BLD-MCNC: 7.6 G/DL (ref 11.5–15.4)
O2 CT BLDA-SCNC: 10.6 ML/DL (ref 16–23)
OXYHGB MFR BLDA: 90.4 % (ref 94–97)
PCO2 BLDA: 50.9 MM HG (ref 36–44)
PH BLDA: 7.41 [PH] (ref 7.35–7.45)
PO2 BLDA: 65.4 MM HG (ref 75–129)
SPECIMEN SOURCE: ABNORMAL

## 2022-12-03 RX ORDER — HYDROXYZINE 50 MG/1
50 TABLET, FILM COATED ORAL
Qty: 30 TABLET | Refills: 0 | Status: SHIPPED | OUTPATIENT
Start: 2022-12-03 | End: 2022-12-08 | Stop reason: SDUPTHER

## 2022-12-03 RX ORDER — GUAIFENESIN 1200 MG/1
1200 TABLET, EXTENDED RELEASE ORAL 2 TIMES DAILY
Qty: 60 TABLET | Refills: 0 | Status: SHIPPED | OUTPATIENT
Start: 2022-12-03 | End: 2022-12-08 | Stop reason: SDUPTHER

## 2022-12-03 RX ORDER — CEFPODOXIME PROXETIL 200 MG/1
400 TABLET, FILM COATED ORAL 2 TIMES DAILY WITH MEALS
Qty: 12 TABLET | Refills: 0 | Status: SHIPPED | OUTPATIENT
Start: 2022-12-03 | End: 2022-12-06

## 2022-12-03 RX ORDER — UMECLIDINIUM BROMIDE AND VILANTEROL TRIFENATATE 62.5; 25 UG/1; UG/1
1 POWDER RESPIRATORY (INHALATION) DAILY
Qty: 60 BLISTER | Refills: 0 | Status: SHIPPED | OUTPATIENT
Start: 2022-12-03 | End: 2022-12-08 | Stop reason: SDUPTHER

## 2022-12-03 RX ORDER — PANTOPRAZOLE SODIUM 40 MG/1
40 TABLET, DELAYED RELEASE ORAL DAILY
Qty: 90 TABLET | Refills: 0 | Status: SHIPPED | OUTPATIENT
Start: 2022-12-03 | End: 2022-12-08 | Stop reason: SDUPTHER

## 2022-12-03 RX ADMIN — COLLAGENASE SANTYL: 250 OINTMENT TOPICAL at 09:30

## 2022-12-03 RX ADMIN — LEVALBUTEROL HYDROCHLORIDE 1.25 MG: 1.25 SOLUTION, CONCENTRATE RESPIRATORY (INHALATION) at 14:33

## 2022-12-03 RX ADMIN — INSULIN LISPRO 2 UNITS: 100 INJECTION, SOLUTION INTRAVENOUS; SUBCUTANEOUS at 12:11

## 2022-12-03 RX ADMIN — LEVALBUTEROL HYDROCHLORIDE 1.25 MG: 1.25 SOLUTION, CONCENTRATE RESPIRATORY (INHALATION) at 08:51

## 2022-12-03 RX ADMIN — PREDNISONE 10 MG: 10 TABLET ORAL at 09:30

## 2022-12-03 RX ADMIN — IPRATROPIUM BROMIDE 0.5 MG: 0.5 SOLUTION RESPIRATORY (INHALATION) at 14:33

## 2022-12-03 RX ADMIN — LEVOTHYROXINE SODIUM 100 MCG: 100 TABLET ORAL at 05:35

## 2022-12-03 RX ADMIN — ATORVASTATIN CALCIUM 10 MG: 10 TABLET, FILM COATED ORAL at 09:30

## 2022-12-03 RX ADMIN — GUAIFENESIN 1200 MG: 600 TABLET, EXTENDED RELEASE ORAL at 09:30

## 2022-12-03 RX ADMIN — METOPROLOL TARTRATE 50 MG: 50 TABLET, FILM COATED ORAL at 09:30

## 2022-12-03 RX ADMIN — IPRATROPIUM BROMIDE 0.5 MG: 0.5 SOLUTION RESPIRATORY (INHALATION) at 08:51

## 2022-12-03 RX ADMIN — PANTOPRAZOLE SODIUM 40 MG: 40 INJECTION, POWDER, FOR SOLUTION INTRAVENOUS at 09:29

## 2022-12-03 RX ADMIN — BUDESONIDE 0.5 MG: 0.5 INHALANT ORAL at 08:51

## 2022-12-03 RX ADMIN — FORMOTEROL FUMARATE DIHYDRATE 20 MCG: 20 SOLUTION RESPIRATORY (INHALATION) at 08:51

## 2022-12-03 NOTE — RESPIRATORY THERAPY NOTE
ABG drawn on 2 LPm  [employer]'s test preformed  ABG drawn from [facility]  Patient tolerated well  Specimen labeled and sent sent to lab for processing

## 2022-12-03 NOTE — RESPIRATORY THERAPY NOTE
Mrs Vince Medrano refused her mornig ABG after overnight pulse ox usage  She has had blood gases before and was very apprehensive once I arrived in the morning  I explained that I couldn't make her do the blood gas against her will and she responded "I don't want it, I've had it done before and it's very painful"

## 2022-12-03 NOTE — PROGRESS NOTES
Progress Note - Pulmonary   Learta Hansa 60 y o  female MRN: [id number]  Unit/Bed#: S -01 Encounter: [id number]    Assessment/Plan:    1  Acute on chronic hypoxic and hypercapnic respiratory failure likely multifaceted as listed below       -  currently on 2 L-95%, patient reports home O2 requirements are 4 L       -  maintain saturations greater than 89%       -  pulmonary toilet:  Deep breathing cough, OOB as tolerated, IS Q 1 hr    2  Possible COPD of unknown severity with resolved acute exacerbation       -  clear to home regimen:  Proventil 2 puffs q 6h p r n , albuterol nebulizer q 6h p r n        -  please discharge patient on: Anoro 62 5/25 mcg 1 puff daily       -  patient completed prednisone taper today       -  will need formal PFT testing and close pulmonary follow-up    3  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 in the setting of restrictive lung disease secondary to morbid obesity       -  unfortunately patient did not qualify for BiPAP at home       -  ABG -  hypercapnia was compensated       -  overnight pulse ox: on 2L :  > 88% x 1 hr 4 min       -  patient will need to be discharged on 4 L q h s  With repeat outpatient overnight pulse ox and formal PSG testing    4  Acute blood loss       -  12/2/2022- repeat colonoscopy       -  no noted lesions or bleeding       -  managed per primary team      - outpatient follow-up per discharge instructions  - pulmonary will sign off    Chief Complaint:    "I want to go home"    Subjective:    Rosa Isela Ramires was comfortably sitting in her bed  She reports she would like to be discharged today  No significant overnight events reported  Patient currently denying any fevers, chills hemoptysis, headaches night sweats, pleuritic chest pain, or palpitations  Objective:    Vitals: Blood pressure 102/68, pulse 88, temperature 98 °F (36 7 °C), resp  rate 18, height 5' 4" (1 626 m), weight (!) 147 kg (324 lb 4 8 oz), SpO2 95 %, not currently breastfeeding   OHS ,Body mass index is 55 67 kg/m²  Intake/Output Summary (Last 24 hours) at 12/3/2022 1127  Last data filed at 12/2/2022 1414  Gross per 24 hour   Intake 700 ml   Output --   Net 700 ml       Invasive Devices     Peripheral Intravenous Line  Duration           Long-Dwell Peripheral IV (Midline) 97/33/35 Left Cephalic 7 days                Physical Exam:   Physical Exam  Constitutional:       General: She is not in acute distress  Appearance: Normal appearance  She is obese  She is not ill-appearing  HENT:      Head: Normocephalic and atraumatic  Nose: No congestion or rhinorrhea  Mouth/Throat:      Mouth: Mucous membranes are dry  Pharynx: No oropharyngeal exudate  Cardiovascular:      Rate and Rhythm: Normal rate and regular rhythm  Pulses: Normal pulses  Heart sounds: No murmur heard  No friction rub  No gallop  Pulmonary:      Effort: Pulmonary effort is normal  No tachypnea, bradypnea, accessory muscle usage or respiratory distress  Breath sounds: Decreased air movement present  No stridor  Decreased breath sounds present  No wheezing, rhonchi or rales  Comments: Significantly distant breath sounds  Chest:      Chest wall: No tenderness  Abdominal:      General: Abdomen is flat  Bowel sounds are normal  There is no distension  Palpations: Abdomen is soft  There is no mass  Musculoskeletal:         General: No swelling or tenderness  Normal range of motion  Cervical back: Normal range of motion  No rigidity or tenderness  Skin:     General: Skin is warm and dry  Coloration: Skin is not jaundiced or pale  Neurological:      General: No focal deficit present  Mental Status: She is alert and oriented to person, place, and time  Mental status is at baseline  Psychiatric:         Mood and Affect: Mood normal          Behavior: Behavior normal          Labs: I have personally reviewed pertinent lab results  , ABG:   Lab Results   Component Value Date PHART 7 413 12/03/2022    PML4ANS 50 9 (H) 12/03/2022    PO2ART 65 4 (L) 12/03/2022    KPS6CIL 31 8 (H) 12/03/2022    BEART 6 4 12/03/2022    SOURCE Artery 12/03/2022   , BNP: No results found for: BNP, CBC:   Lab Results   Component Value Date    HGB 7 6 (L) 12/03/2022       Imaging and other studies: I have personally reviewed pertinent films in PACS     Chest x-ray- 11/27/2022- no new consolidations

## 2022-12-03 NOTE — CASE MANAGEMENT
Case Management Discharge Planning Note    Patient name Xavier Payan  Location S /S -77 MRN [id number]  : 1960 Date 12/3/2022       Current Admission Date: 2022  Current Admission Diagnosis:Acute on chronic respiratory failure with hypoxia and hypercapnia [facility])   Patient Active Problem List    Diagnosis Date Noted   • Acute blood loss anemia 2022   • History of hysterectomy 2022   • Panniculitis 2022   • Acute on chronic respiratory failure with hypoxia and hypercapnia ([facility] ) 2022   • Abnormal CT scan 2022   • Panniculus 2022   • Bilateral pleural effusion 2022   • Pneumonia due to infectious organism 2022   • Cardiomegaly 2022   • Diabetic polyneuropathy associated with type 2 diabetes mellitus ([facility] ) 10/26/2022   • Leg wound, left, initial encounter 10/26/2022   • Diabetic neuropathy ([facility] ) 10/26/2022   • Tinea 2022   • Diabetic nephropathy associated with type 2 diabetes mellitus ([address] ) 2022   • Depression, recurrent ([address] ) 2022   • Diabetes mellitus type 2 in obese ([facility] ) 2022   • Pre-ulcerative corn or callous 2022   • Supratherapeutic INR 2021   • Morbid (severe) obesity due to excess calories ([address] ) 2021   • Secondary hyperparathyroidism of renal origin ([facility] ) 2021   • GERD (gastroesophageal reflux disease) 2020   • Anxiety 2020   • Chronic constipation 2020   • Severe sepsis ([address] ) 2020   • Acute renal failure superimposed on chronic kidney disease ([address] ) 2020   • Paroxysmal atrial fibrillation ([address] ) 2020   • COPD with asthma ([address] ) 2020   • Hypothyroid 2020   • Cellulitis 09/10/2020   • H/O right nephrectomy 09/10/2020   • SOB (shortness of breath) 2020   • Hyperparathyroidism ([facility] ) 2020   • Dyslipidemia 2019   • Anemia due to stage 3b chronic kidney disease ([facility] ) 10/04/2018   • Chronic kidney disease, stage III (moderate) ([facility] [address] [address] ) 10/04/2018   • Hypertensive chronic kidney disease with stage 1 through stage 4 chronic kidney disease, or unspecified chronic kidney disease 10/04/2018   • Persistent proteinuria 10/04/2018   • Iron deficiency 10/04/2018      LOS (days): 12  Geometric Mean LOS (GMLOS) (days): 8 50  Days to GMLOS:-3 6     OBJECTIVE:  Risk of Unplanned Readmission Score: 30 27         Current admission status: Inpatient   Preferred Pharmacy:   [address] [place] [address] 7[address] [facility] [address] [place] [address],[address]  [address]  Phone: [phone] Fax: [phone]    Primary Care Provider: Kirsty Torres MD    Primary Insurance: MEDICARE  Secondary Insurance: [facility]    DISCHARGE DETAILS:    Discharge planning discussed with[de-identified] Patient, Daniel Leigh Krys RN     Comments - [place] of Choice: CM notified all the above mentioned individuals of the Pt's scheduled d/c to home today  Contacts  Patient Contacts: Guru Barnes Method: Phone  Phone Number: [phone]  Reason/Outcome: Discharge Planning           Dispatcher Contacted: Yes  Number/Name of Dispatcher: Gloria Miranda [id number]  Transported by [employer] and Unit #):  [id number]  ETA of Transport (Date): 12/03/22  ETA of Transport (Time): 8510

## 2022-12-03 NOTE — DISCHARGE INSTR - AVS FIRST PAGE
Dear Nico Soto,     It was our pleasure to care for you here at [facility]  It is our hope that we were always able to exceed the expected standards for your care during your stay  You were hospitalized due to acute respiratory failure  You were cared for on the 2nd floor by Jim Falls DO Alicia under the service of Liz aLne MD with the [facility] who covers for your primary care physician (PCP), Rao Marie MD, while you were hospitalized  If you have any questions or concerns related to this hospitalization, you may contact us at [phone]  For follow up as well as any medication refills, we recommend that you follow up with your primary care physician  A registered nurse will reach out to you by phone within a few days after your discharge to answer any additional questions that you may have after going home  However, at this time we provide for you here, the most important instructions / recommendations at discharge:     Notable Medication Adjustments -   Please start taking Protonix 40 mg daily  Please start using Anoro Ellipta inhaler 1 puff daily  Testing Required after Discharge -   CBC and platelets  Important follow up information -   Please follow-up with your PCP  Please follow-up with GI as an outpatient if you develop recurrent bleeding and black tarry stools  Please follow-up with the pulmonologists for monitoring of your COPD  Other Instructions -   Ambulatory referral to Gastroenterology for possible video capsule endoscopy in the setting of her recurrent melena and refractory anemia  Ambulatory referral to pulmonology  He will require use of supplemental oxygen 2 L during the day and 4 L at bedtime  You will require repeat outpatient overnight pulse ox with formal PSG testing  This will be facilitated by the pulmonologist you follow-up with as an outpatient    Please review this entire after visit summary as additional general instructions including medication list, appointments, activity, diet, any pertinent wound care, and other additional recommendations from your care team that may be provided for you        Sincerely,     Rio Handy DO

## 2022-12-03 NOTE — DISCHARGE SUMMARY
[facility]  Discharge- Hubert Santo 1960, 60 y o  female MRN: [id number]  Unit/Bed#: S -01 Encounter: [id number]  Primary Care Provider: Leia Cuello MD   Date and time admitted to hospital: 11/21/2022 12:58 PM    * Acute on chronic respiratory failure with hypoxia and hypercapnia (HCC)  Assessment & Plan  · In the setting of COPD/asthma with acute exacerbation and recent PNA   · At baseline, requires 4L NC, O2 requirement now at baseline  · S/p 5 day course abx for pneumonia  · CT C/A/P showed multifocal scattered patchy nodular opacities in RUL and possibly RLL, was treated for pneumonia with ceftriaxone this admission  · VBG on 11/28 showed a respiratory acidosis with appropriate compensation     Plan  · Patient unfortunately did not qualify for home BiPAP  · Completed prednisone taper  · Patient will start using Anoro Ellipta inhaler 1 puff daily  · Encourage cough, deep breathing, IS, ambulation with assistance  · Monitor oxygen requirements  She will be maintained on 2 liters/minute at rest and 4 liters/minute at bedtime  · Close outpatient follow-up with pulmonology    COPD with asthma [facility])  Assessment & Plan  · With acute exacerbation  · Continue scheduled xopenex, atrovent, pulmicort, perforomist    Plan  · Completed prednisone taper  · Will start Anoro Ellipta inhaler 1 puff daily  · Patient did not qualify for home BiPAP  She will require continuous oxygen at 2 L at rest and 4 L at bedtime  · Outpatient follow up with pulmonary      Acute blood loss anemia  Assessment & Plan  Recent Labs     12/02/22  0459 12/02/22  1842 12/03/22  0549   HGB 7 2* 7 9* 7 6*       Likely 2/2 acute blood loss anemia vs hemolysis  Patient with 3 episodes of melanotic stool on 11/24/22 with drop in Hgb required 2 units of prbc total  Hemoglobin overnight was found to be 6 9  Patient was given additional PRBC transfusion    Recent bowel movements overnight were reported to be black tarry stools  Hemolysis labs: reticulocyte count elevated, and LDH within normal limits  Repeat push enteroscopy was prematurely aborted due to respiratory instability  Colonoscopy was unable to yield good images due to inadequate bowel prep and inability to advance the scope past the hepatic flexure  Repeat colonoscopy unremarkable for any lesions and evidence of bleeding  Direct Suresh test negative    Plan:  • Monitor hemoglobin as outpatient  • Transfuse if Hb < 7  • Consent for transfusion obtained: Yes   • Continue PO PPI daily  · Resume home coumadin  · Ambulatory referral to Gastroenterology  Patient will likely need video capsule endoscopy as outpatient if there is recurrent bleeding and refractory anemia  · Repeat colonoscopy in 5 years          Panniculitis  Assessment & Plan  · 11/20 CT C/A/P: acute panniculitis, not appreciated on physical exam  · Monitor off abx    Bilateral pleural effusion  Assessment & Plan  · S/p diuresis with 40mg IV lasix daily    Plan  · Would continue PRN diuresis for goal -500ml over 24 hours     Pneumonia due to infectious organism  Assessment & Plan  · Diagnosed as OP  · S/p 5 days abx as OP and continued on admssion  · Patient remains afebrile, without leukocytosis, procal negative   · Monitor off abx     Leg wound, left, initial encounter  Assessment & Plan  · Wound care nurse following    Diabetes mellitus type 2 in obese [facility])  Assessment & Plan  Lab Results   Component Value Date    HGBA1C 7 0 (H) 11/01/2022       Recent Labs     12/02/22  1712 12/02/22  2053 12/03/22  0759 12/03/22  1055   POCGLU 128 188* 114 198*       Blood Sugar Average: Last 72 hrs:  (P) 149 [id number]     · ACHS glucose checks and SSI for goal glucose 140-180  · Sliding scale  · Outpatient follow-up with PCP    Morbid (severe) obesity due to excess calories (HCC)  Assessment & Plan  · Lifestyle modification    Anxiety  Assessment & Plan  · Continue Atarax 50 mg qHS    GERD (gastroesophageal reflux disease)  Assessment & Plan  · Continue PO Protonix 40 mg daily    Hypothyroid  Assessment & Plan  · Continue home levothyroxine    Paroxysmal atrial fibrillation [facility])  Assessment & Plan  Recent Labs     11/25/22  1456 11/26/22  0438 11/27/22  0432   INR 2 08* 2 19* 1 68*     · Currently rate controlled  · Continue home metoprolol    Plan:  · Continue home coumadin           Dyslipidemia  Assessment & Plan  · Continue home statin    Chronic kidney disease, stage III (moderate) ([facility])  Assessment & Plan  Lab Results   Component Value Date    EGFR 29 12/02/2022    EGFR 29 12/01/2022    EGFR 31 11/30/2022    CREATININE 1 79 (H) 12/02/2022    CREATININE 1 81 (H) 12/01/2022    CREATININE 1 72 (H) 11/30/2022     · Baseline Cr: 1 8 - 2 4  · Currently at baseline    Plan:  · Continue to monitor I/O and renal indices  · Continue PRN diuresis for goal negative 500ml/24 hours  · Avoid hypotension  · Avoid nephrotoxins    Acute metabolic encephalopathy-resolved as of 12/3/2022  Assessment & Plan  · Likely in the setting of acute on chronic hypoxic and hypercapnic respiratory failure, now improving    Plan  · Continue BIPAP PRN and HS  · Regulate sleep/wake  · Delirium precautions  · Frequent neuro checks and reorientation     Medical Problems     Resolved Problems  Date Reviewed: 12/3/2022          Resolved    Acute metabolic encephalopathy 01/7/7274     Resolved by  [id number] [employer] [facility], DO        Discharging Resident: [id number] [facility], DO  Discharging Attending: Christelle Montoya MD  PCP: Kyle Santos MD  Admission Date:   Admission Orders (From admission, onward)     Ordered        11/21/22 1314  Inpatient Admission  Once                      Discharge Date: 12/03/22    Consultations During Hospital Stay:  · Pulmonary, Gastroenterology, Palliative Care    Procedures Performed:   · EGD with push enteroscopy x2, colonoscopy x2    Significant Findings / Test Results:   CT abdomen pelvis wo contrast   Final Result by Hubert Mcintosh MD (11/28 7982)      No acute abnormality in abdomen or pelvis  No retroperitoneal hematoma, as clinically questioned  Subacute panniculitis  Recommend continued direct visualization  Unchanged large right lower quadrant ventral abdominal hernia containing small bowel loops, proximal ascending colon, appendix, and mesenteric fat  Additional chronic/incidental findings as detailed above  Workstation performed: [id number]         XR chest portable   Final Result by Abdon Joe MD (11/28 0899)   No new consolidation   Improving congestion   No worsening seen      Blunting of the right CP angle suggests small effusion         Workstation performed: [id number]           EGD with Push Enteroscopy    Result Date: 11/25/2022  Impression: 1  Diminutive gastric AVM, ablated using APC  2  Multiple duodenal AVMs, intermittently oozing noted in D3, obliterated using APC  3  Mild gastritis RECOMMENDATION:  There is no recommended follow-up for this procedure  Monitor H&H and transfuse as needed  Continue Protonix IV b i d  Monitor the color of the stool  Hold anticoagulation at this time if possible  May start on clear liquid diet today  Sierra Samaniego MD       No Chest XR results available for this patient  Incidental Findings:   · None  · I reviewed the above mentioned incidental findings with the patient and/or family and they expressed understanding  Test Results Pending at Discharge (will require follow up):   · Haptoglobin and Direct Suresh test     Outpatient Tests Requested:  · CBC and platelets    Complications:  Acute blood loss anemia    Reason for Admission:  Acute hypoxic respiratory failure    Hospital Course:   Irving Concepcion is a 60 y o  female patient with a past medical history of COPD on 4 L at baseline, SASHA, CKD stage 3, type 2 diabetes mellitus, GERD, and hypothyroidism who originally presented to the hospital on 11/21/2022 due to acute hypoxic respiratory failure  She was a transfer from [facility]   Initial ABG showed a pH is 7 1 and pCO2 of 129 6 signifying significant hypercapnia  She was placed on BiPAP and given a one time dose of Solu-Medrol  The patient also came in with acute encephalopathy likely secondary to her acute hypoxic respiratory failure and hypercapnia  CT of the chest abdomen and pelvis showed multifocal scattered patchy nodular opacities in the right upper and possibly right lower lobes, suspicious for infection firs inflammation or aspiration  There was also some small bilateral pleural effusions with multifocal subsegmental atelectasis  Her pneumonia was treated with ceftriaxone for which she completed for 2 days  She was observed in the ICU and was eventually downgraded to the stepdown level 2  During the patient's hospitalization, the patient was found to have a drop of hemoglobin to 6 7 with several episodes of melena  Her home warfarin was held  She received initially 2 units of PRBC transfusions with an increase of her hemoglobin to 8  She was started on a Protonix drip with close H&H monitoring  A push enteroscopy was performed on 11/25 which did show gastric and duodenal AVMs that were ablated with APC  She was transition to IV Protonix b i d    The patient's hemoglobin continued to drop following treatment of her AVMs and continued to have melena  She received an additional 2 units of blood transfusions since the 1st EGD with push enteroscopy  In total, the patient received 10 blood transfusions during her hospitalization  GI performed a repeat push enteroscopy and colonoscopy on 11/30, which was unsuccessful due to respiratory compromise and poor bowel prep  The patient was subsequently started on an extensive 3 day bowel prep for a repeat colonoscopy on 12/2  The repeat colonoscopy was unremarkable for any lesions and evidence of bleeding    The patient was deemed stable for discharge on 12/03/2022 following home BiPAP qualification test   She unfortunately did not qualify for home BiPAP  She is to be discharged home with A services  Pulmonology recommended continue the patient on 2 L of oxygen at rest and 4 L at bedtime  She will continue using her home inhaler and nebulizer with the addition of Anoro Ellipta  She is to follow-up with her PCP with outpatient CBC testing in a week  She will require follow-up with GI as an outpatient with plan for repeat colonoscopy in 5 years  She may require video capsule endoscopy if she develops recurrent bleeding or refractory anemia  Please see above list of diagnoses and related plan for additional information  Condition at Discharge: stable    Discharge Day Visit / Exam:   Subjective:  No acute events overnight  Patient seen and examined at the bedside this morning  She is eager for discharge today and offers no acute complaints  She had the significant other are both confused as to why she did not qualify for home BiPAP, however she is still agreeable to leaving today  Vitals: Blood Pressure: 102/68 (12/03/22 0728)  Pulse: 88 (12/03/22 0728)  Temperature: 98 °F (36 7 °C) (12/03/22 0728)  Temp Source: Oral (12/02/22 1550)  Respirations: 18 (12/02/22 1550)  Height: 5' 4" (162 6 cm) (12/02/22 1220)  Weight - Scale: (!) 147 kg (324 lb 4 8 oz) (12/03/22 0600)  SpO2: 95 % (12/03/22 0851)  Exam:   Physical Exam  Vitals and nursing note reviewed  Constitutional:       General: She is not in acute distress  Appearance: She is morbidly obese  She is ill-appearing  HENT:      Head: Normocephalic and atraumatic  Mouth/Throat:      Mouth: Mucous membranes are moist    Eyes:      Pupils: Pupils are equal, round, and reactive to light  Cardiovascular:      Rate and Rhythm: Normal rate  Rhythm irregular  Pulses: Normal pulses  Heart sounds: Normal heart sounds  No murmur heard  No friction rub  No gallop  Pulmonary:      Effort: Pulmonary effort is normal  No respiratory distress  Breath sounds: No wheezing, rhonchi or rales  Comments: Limited d/t body habitus  Diminished    Abdominal:      General: Bowel sounds are normal  There is no distension  Palpations: Abdomen is soft  Tenderness: There is abdominal tenderness  Comments: Mild generalized abdominal tenderness   Musculoskeletal:         General: Swelling present  Normal range of motion  Cervical back: Neck supple  Right lower leg: Edema present  Left lower leg: Edema present  Skin:     General: Skin is warm and dry  Capillary Refill: Capillary refill takes less than 2 seconds  Neurological:      General: No focal deficit present  Mental Status: She is alert and oriented to person, place, and time  Cranial Nerves: No cranial nerve deficit  Sensory: No sensory deficit  Motor: No weakness  Psychiatric:         Behavior: Behavior is cooperative  Discussion with Family: Updated  (significant other) via phone  Discharge instructions/Information to patient and family:   See after visit summary for information provided to patient and family  Provisions for Follow-Up Care:  See after visit summary for information related to follow-up care and any pertinent home health orders  Disposition:   Home with VNA Services (Reminder: Complete face to face encounter)    Planned Readmission:  None    Discharge Medications:  See after visit summary for reconciled discharge medications provided to patient and/or family        **Please Note: This note may have been constructed using a voice recognition system**

## 2022-12-03 NOTE — PROGRESS NOTES
Progress Note - Cintia Knight 60 y o  female MRN: [id number]    Unit/Bed#: S -01 Encounter: [id number]        Subjective:   Patient denies any abdominal pain, chest pain, any worsening shortness of breath she is on her baseline supplemental oxygen requirements at this time  Objective:     Vitals: Blood pressure 102/68, pulse 88, temperature 98 °F (36 7 °C), resp  rate 18, height 5' 4" (1 626 m), weight (!) 147 kg (324 lb 4 8 oz), SpO2 95 %, not currently breastfeeding  ,Body mass index is 55 67 kg/m²  Intake/Output Summary (Last 24 hours) at 12/3/2022 1311  Last data filed at 12/2/2022 1414  Gross per 24 hour   Intake 700 ml   Output --   Net 700 ml       Physical Exam:   General appearance: alert, appears stated age and cooperative  Lungs: clear to auscultation bilaterally, no labored breathing/accessory muscle use  Heart: regular rate and rhythm, S1, S2 normal, no murmur, click, rub or gallop  Abdomen: soft, non-tender; bowel sounds normal; no masses,  no organomegaly  Extremities: no edema    Invasive Devices     Peripheral Intravenous Line  Duration           Long-Dwell Peripheral IV (Midline) 89/82/86 Left Cephalic 7 days                Lab, Imaging and other studies: I have personally reviewed pertinent reports  No results displayed because visit has over 200 results  Latest Reference Range & Units 12/02/22 04:59 12/02/22 18:42 12/03/22 05:49   Hemoglobin 11 5 - 15 4 g/dL 7 2 (L) 7 9 (L) 7 6 (L)   (L): Data is abnormally low      Assessment/Plan:    1  Symptomatic anemia in the setting of anticoagulation with Coumadin, initially reported with melena; EGD with push enteroscopy earlier in admission showed AVMs in the stomach and duodenum which were treated; she underwent colonoscopy yesterday showing no substantial bleeding sources      - continue to trend hemoglobin; if patient noted with any evidence of ongoing occult GI bleeding, can consider capsule study as outpatient, or Podiatry repeating EGD for retreatment of AVMs    -continue PPI    -diet as tolerated

## 2022-12-04 LAB — HAPTOGLOB SERPL-MCNC: 198 MG/DL (ref 37–355)

## 2022-12-05 ENCOUNTER — HOME CARE VISIT (OUTPATIENT)
Dept: HOME HEALTH SERVICES | Facility: HOME HEALTHCARE | Age: 62
End: 2022-12-05

## 2022-12-05 VITALS
RESPIRATION RATE: 16 BRPM | DIASTOLIC BLOOD PRESSURE: 62 MMHG | TEMPERATURE: 98.6 F | OXYGEN SATURATION: 98 % | SYSTOLIC BLOOD PRESSURE: 115 MMHG | HEART RATE: 99 BPM

## 2022-12-06 ENCOUNTER — PATIENT OUTREACH (OUTPATIENT)
Dept: FAMILY MEDICINE CLINIC | Facility: CLINIC | Age: 62
End: 2022-12-06

## 2022-12-06 DIAGNOSIS — Z71.89 COMPLEX CARE COORDINATION: Primary | ICD-10-CM

## 2022-12-06 NOTE — PROGRESS NOTES
HRR referral for CCM  Chart was reviewed  Patient was recently hospitalized at [facility] with hypercapnic respiratory failure, COPD exacerbation and PNA  Patient was discharged home with her significant other Bebe Rivero and [facility] through [address] [place] [address]  I called patients SO Constanza and introduced myself and explained the role of the [facility] RN and complex case management  Teressa Amparo states patient is doing well since discharge and declines the need for any follow up  She states she is patients FT care giver and they have all DME in place  She states that patient has a CM Amara Hendricks through her insurance company/ [facility] that helps her with any issues that arise  She states patients diabetes is under control  She states she manages all of patients medications  They were ailyn to obtain all medications at dc and patient is taking them as ordered  Teressa Christensen declines any follow up calls at this time  I offered my contact information and she declined stating they already have a CM through [employer]  No CCM episode opened

## 2022-12-07 ENCOUNTER — TELEPHONE (OUTPATIENT)
Dept: FAMILY MEDICINE CLINIC | Facility: CLINIC | Age: 62
End: 2022-12-07

## 2022-12-07 ENCOUNTER — TRANSITIONAL CARE MANAGEMENT (OUTPATIENT)
Dept: FAMILY MEDICINE CLINIC | Facility: CLINIC | Age: 62
End: 2022-12-07

## 2022-12-07 NOTE — TELEPHONE ENCOUNTER
Carlos Phillips called she called the pharmacy today and they told her nothing was called in  She needs the medications that were called in on the 3rd  Resent or called   [facility]

## 2022-12-08 ENCOUNTER — HOME CARE VISIT (OUTPATIENT)
Dept: HOME HEALTH SERVICES | Facility: HOME HEALTHCARE | Age: 62
End: 2022-12-08

## 2022-12-08 ENCOUNTER — APPOINTMENT (OUTPATIENT)
Dept: LAB | Facility: CLINIC | Age: 62
End: 2022-12-08

## 2022-12-08 VITALS
OXYGEN SATURATION: 98 % | RESPIRATION RATE: 16 BRPM | SYSTOLIC BLOOD PRESSURE: 128 MMHG | DIASTOLIC BLOOD PRESSURE: 68 MMHG | TEMPERATURE: 98.1 F | HEART RATE: 99 BPM

## 2022-12-08 DIAGNOSIS — K92.1 MELENA: ICD-10-CM

## 2022-12-08 DIAGNOSIS — D62 ACUTE BLOOD LOSS ANEMIA: ICD-10-CM

## 2022-12-08 DIAGNOSIS — J44.9 COPD WITH ASTHMA (HCC): Chronic | ICD-10-CM

## 2022-12-08 DIAGNOSIS — F41.9 ANXIETY: ICD-10-CM

## 2022-12-08 LAB
ERYTHROCYTE [DISTWIDTH] IN BLOOD BY AUTOMATED COUNT: 18.8 % (ref 11.6–15.1)
HCT VFR BLD AUTO: 24.9 % (ref 34.8–46.1)
HGB BLD-MCNC: 7.1 G/DL (ref 11.5–15.4)
INR PPP: 1.11 (ref 0.84–1.19)
INR PPP: 1.11 (ref 0.84–1.19)
MCH RBC QN AUTO: 29.7 PG (ref 26.8–34.3)
MCHC RBC AUTO-ENTMCNC: 28.5 G/DL (ref 31.4–37.4)
MCV RBC AUTO: 104 FL (ref 82–98)
PLATELET # BLD AUTO: 239 THOUSANDS/UL (ref 149–390)
PMV BLD AUTO: 10.7 FL (ref 8.9–12.7)
PROTHROMBIN TIME: 14.6 SECONDS (ref 11.6–14.5)
RBC # BLD AUTO: 2.39 MILLION/UL (ref 3.81–5.12)
WBC # BLD AUTO: 7.27 THOUSAND/UL (ref 4.31–10.16)

## 2022-12-08 RX ORDER — UMECLIDINIUM BROMIDE AND VILANTEROL TRIFENATATE 62.5; 25 UG/1; UG/1
1 POWDER RESPIRATORY (INHALATION) DAILY
Qty: 60 BLISTER | Refills: 5 | Status: SHIPPED | OUTPATIENT
Start: 2022-12-08 | End: 2023-01-07

## 2022-12-08 RX ORDER — GUAIFENESIN 1200 MG/1
1200 TABLET, EXTENDED RELEASE ORAL 2 TIMES DAILY
Qty: 60 TABLET | Refills: 2 | Status: SHIPPED | OUTPATIENT
Start: 2022-12-08 | End: 2023-01-07

## 2022-12-08 RX ORDER — PANTOPRAZOLE SODIUM 40 MG/1
40 TABLET, DELAYED RELEASE ORAL DAILY
Qty: 90 TABLET | Refills: 1 | Status: SHIPPED | OUTPATIENT
Start: 2022-12-08 | End: 2023-03-08

## 2022-12-08 RX ORDER — HYDROXYZINE 50 MG/1
50 TABLET, FILM COATED ORAL
Qty: 30 TABLET | Refills: 2 | Status: SHIPPED | OUTPATIENT
Start: 2022-12-08

## 2022-12-08 NOTE — CASE COMMUNICATION
Faxed Dr Marie Degroot and spoke to Steven with the patient in the background regarding the delay in initiating OT within 7 days  Steven declined the need for OT at this time   Explained that if the patient needs OT in the future that she will need a new referral

## 2022-12-09 ENCOUNTER — HOME CARE VISIT (OUTPATIENT)
Dept: HOME HEALTH SERVICES | Facility: HOME HEALTHCARE | Age: 62
End: 2022-12-09

## 2022-12-09 ENCOUNTER — RA CDI HCC (OUTPATIENT)
Dept: OTHER | Facility: HOSPITAL | Age: 62
End: 2022-12-09

## 2022-12-09 ENCOUNTER — ANTICOAG VISIT (OUTPATIENT)
Dept: FAMILY MEDICINE CLINIC | Facility: CLINIC | Age: 62
End: 2022-12-09

## 2022-12-09 ENCOUNTER — TELEPHONE (OUTPATIENT)
Dept: FAMILY MEDICINE CLINIC | Facility: CLINIC | Age: 62
End: 2022-12-09

## 2022-12-09 VITALS — DIASTOLIC BLOOD PRESSURE: 66 MMHG | SYSTOLIC BLOOD PRESSURE: 102 MMHG | HEART RATE: 85 BPM

## 2022-12-09 DIAGNOSIS — D62 ACUTE BLOOD LOSS ANEMIA: Primary | ICD-10-CM

## 2022-12-09 RX ORDER — FERROUS SULFATE 325(65) MG
325 TABLET ORAL
Qty: 90 TABLET | Refills: 1 | Status: SHIPPED | OUTPATIENT
Start: 2022-12-09

## 2022-12-09 NOTE — TELEPHONE ENCOUNTER
Called and spoke to Steven and patient gave coumadin instruction and recheck INR in 1 week  Take iron daily     Jennifer Concepcion

## 2022-12-10 ENCOUNTER — HOME CARE VISIT (OUTPATIENT)
Dept: HOME HEALTH SERVICES | Facility: HOME HEALTHCARE | Age: 62
End: 2022-12-10

## 2022-12-10 ENCOUNTER — HOSPITAL ENCOUNTER (EMERGENCY)
Facility: HOSPITAL | Age: 62
Discharge: HOME | End: 2022-12-10
Attending: EMERGENCY MEDICINE

## 2022-12-10 ENCOUNTER — APPOINTMENT (EMERGENCY)
Dept: RADIOLOGY | Facility: HOSPITAL | Age: 62
End: 2022-12-10

## 2022-12-10 VITALS
BODY MASS INDEX: 50.02 KG/M2 | RESPIRATION RATE: 20 BRPM | HEIGHT: 64 IN | SYSTOLIC BLOOD PRESSURE: 89 MMHG | DIASTOLIC BLOOD PRESSURE: 45 MMHG | HEART RATE: 104 BPM | WEIGHT: 293 LBS | TEMPERATURE: 100.1 F | OXYGEN SATURATION: 100 %

## 2022-12-10 DIAGNOSIS — R73.9 HYPERGLYCEMIA: Primary | ICD-10-CM

## 2022-12-10 LAB
ALBUMIN SERPL BCP-MCNC: 2.8 G/DL (ref 3.5–5)
ALP SERPL-CCNC: 128 U/L (ref 34–104)
ALT SERPL W P-5'-P-CCNC: 12 U/L (ref 7–52)
ANION GAP SERPL CALCULATED.3IONS-SCNC: 5 MMOL/L (ref 4–13)
AST SERPL W P-5'-P-CCNC: 10 U/L (ref 13–39)
BACTERIA UR QL AUTO: NORMAL /HPF
BASOPHILS # BLD AUTO: 0.01 THOUSANDS/ÂΜL (ref 0–0.1)
BASOPHILS NFR BLD AUTO: 0 % (ref 0–1)
BILIRUB SERPL-MCNC: 0.36 MG/DL (ref 0.2–1)
BILIRUB UR QL STRIP: NEGATIVE
BUN SERPL-MCNC: 22 MG/DL (ref 5–25)
CALCIUM ALBUM COR SERPL-MCNC: 9.3 MG/DL (ref 8.3–10.1)
CALCIUM SERPL-MCNC: 8.3 MG/DL (ref 8.4–10.2)
CHLORIDE SERPL-SCNC: 99 MMOL/L (ref 96–108)
CLARITY UR: CLEAR
CO2 SERPL-SCNC: 31 MMOL/L (ref 21–32)
COLOR UR: YELLOW
CREAT SERPL-MCNC: 2.22 MG/DL (ref 0.6–1.3)
EOSINOPHIL # BLD AUTO: 0.19 THOUSAND/ÂΜL (ref 0–0.61)
EOSINOPHIL NFR BLD AUTO: 3 % (ref 0–6)
ERYTHROCYTE [DISTWIDTH] IN BLOOD BY AUTOMATED COUNT: 19 % (ref 11.6–15.1)
FLUAV RNA RESP QL NAA+PROBE: NEGATIVE
FLUBV RNA RESP QL NAA+PROBE: NEGATIVE
GFR SERPL CREATININE-BSD FRML MDRD: 22 ML/MIN/1.73SQ M
GLUCOSE SERPL-MCNC: 305 MG/DL (ref 65–140)
GLUCOSE SERPL-MCNC: 320 MG/DL (ref 65–140)
GLUCOSE UR STRIP-MCNC: ABNORMAL MG/DL
HCT VFR BLD AUTO: 25.9 % (ref 34.8–46.1)
HGB BLD-MCNC: 7.2 G/DL (ref 11.5–15.4)
HGB UR QL STRIP.AUTO: ABNORMAL
IMM GRANULOCYTES # BLD AUTO: 0.07 THOUSAND/UL (ref 0–0.2)
IMM GRANULOCYTES NFR BLD AUTO: 1 % (ref 0–2)
KETONES UR STRIP-MCNC: NEGATIVE MG/DL
LEUKOCYTE ESTERASE UR QL STRIP: ABNORMAL
LYMPHOCYTES # BLD AUTO: 0.75 THOUSANDS/ÂΜL (ref 0.6–4.47)
LYMPHOCYTES NFR BLD AUTO: 13 % (ref 14–44)
MCH RBC QN AUTO: 28.5 PG (ref 26.8–34.3)
MCHC RBC AUTO-ENTMCNC: 27.8 G/DL (ref 31.4–37.4)
MCV RBC AUTO: 102 FL (ref 82–98)
MONOCYTES # BLD AUTO: 0.73 THOUSAND/ÂΜL (ref 0.17–1.22)
MONOCYTES NFR BLD AUTO: 12 % (ref 4–12)
NEUTROPHILS # BLD AUTO: 4.25 THOUSANDS/ÂΜL (ref 1.85–7.62)
NEUTS SEG NFR BLD AUTO: 71 % (ref 43–75)
NITRITE UR QL STRIP: NEGATIVE
NON-SQ EPI CELLS URNS QL MICRO: NORMAL /HPF
NRBC BLD AUTO-RTO: 1 /100 WBCS
PH UR STRIP.AUTO: 5.5 [PH]
PLATELET # BLD AUTO: 220 THOUSANDS/UL (ref 149–390)
PMV BLD AUTO: 9.7 FL (ref 8.9–12.7)
POTASSIUM SERPL-SCNC: 4.7 MMOL/L (ref 3.5–5.3)
PROT SERPL-MCNC: 6.4 G/DL (ref 6.4–8.4)
PROT UR STRIP-MCNC: ABNORMAL MG/DL
QRS AXIS: 46 DEGREES
QRSD INTERVAL: 76 MS
QT INTERVAL: 328 MS
QTC INTERVAL: 449 MS
RBC # BLD AUTO: 2.53 MILLION/UL (ref 3.81–5.12)
RBC #/AREA URNS AUTO: NORMAL /HPF
RSV RNA RESP QL NAA+PROBE: NEGATIVE
SARS-COV-2 RNA RESP QL NAA+PROBE: NEGATIVE
SODIUM SERPL-SCNC: 135 MMOL/L (ref 135–147)
SP GR UR STRIP.AUTO: 1.02 (ref 1–1.03)
T WAVE AXIS: 36 DEGREES
UROBILINOGEN UR QL STRIP.AUTO: 0.2 E.U./DL
VENTRICULAR RATE: 113 BPM
WBC # BLD AUTO: 6 THOUSAND/UL (ref 4.31–10.16)
WBC #/AREA URNS AUTO: NORMAL /HPF

## 2022-12-10 RX ORDER — ACETAMINOPHEN 325 MG/1
650 TABLET ORAL ONCE
Status: COMPLETED | OUTPATIENT
Start: 2022-12-10 | End: 2022-12-10

## 2022-12-10 RX ADMIN — ACETAMINOPHEN 650 MG: 325 TABLET ORAL at 06:35

## 2022-12-10 NOTE — ED NOTES
PT needing to have their linens changed  Assisted the pt with the change of bed linens and boosting in bed  Pt verbalizing how upset they are about having to wait for transport        Mely Kc RN  12/10/22 0581

## 2022-12-10 NOTE — CASE COMMUNICATION
Received tc from patients caregiver that patient was not admitted to the hospital and has been sent home

## 2022-12-10 NOTE — ED PROVIDER NOTES
History  Chief Complaint   Patient presents with   • Hyperglycemia - Symptomatic     Patient arrives via EMS with reports of  waking up feeling confused with high BS  Patient presents to the emergency department from home after she states that she woke up with chills and shakes  She has no other systemic concerns at this time  Patient's significant other checked her glucose level and found her to be almost 400 so she brought her to the emergency department for evaluation  History provided by:  Patient, caregiver and EMS personnel   used: No        Prior to Admission Medications   Prescriptions Last Dose Informant Patient Reported? Taking?    Blood Glucose Monitoring Suppl (Gangkrace Pro Glucose Meter) SAVANNAH   No No   Sig: Use 2 (two) times a day Embrace glucometer, test twice daily   Embrace Lancets Ultra Thin 30G MISC   No No   Sig: Use 2 (two) times a day   Guaifenesin 1200 MG TB12   No No   Sig: Take 1 tablet (1,200 mg total) by mouth 2 (two) times a day   LORazepam (ATIVAN) 0 5 mg tablet   No No   Sig: Take 1 tablet (0 5 mg total) by mouth daily at bedtime   albuterol (2 5 mg/3 mL) 0 083 % nebulizer solution   No No   Sig: INHALE CONTENTS OF 1 VIAL ( 3 MILLILITERS ) IN NEBULIZER BY MOUTH AND INTO THE LUNGS EVERY 6 HOURS IF NEEDED FOR WHEEZING OR SHORTNESS OF BREATH   albuterol (PROVENTIL HFA,VENTOLIN HFA) 90 mcg/act inhaler   No No   Sig: Inhale 2 puffs every 4 (four) hours as needed for wheezing   amLODIPine (NORVASC) 5 mg tablet   No No   Sig: Take 1 tablet (5 mg total) by mouth daily   atorvastatin (LIPITOR) 10 mg tablet   No No   Sig: Take 1 tablet (10 mg total) by mouth daily   collagenase (SANTYL) ointment   No No   Sig: Apply topically daily   famotidine (PEPCID) 20 mg tablet   No No   Sig: Take 1 tablet (20 mg total) by mouth daily   ferrous sulfate 325 (65 Fe) mg tablet   No No   Sig: Take 1 tablet (325 mg total) by mouth daily with breakfast   hydrOXYzine HCL (ATARAX) 50 mg tablet   No No   Sig: Take 1 tablet (50 mg total) by mouth daily at bedtime   levothyroxine 100 mcg tablet   No No   Sig: take 1 tablet by mouth in THE EARLY MORNING   metoprolol tartrate (LOPRESSOR) 50 mg tablet   No No   Sig: Take 1 tablet (50 mg total) by mouth 2 (two) times a day   pantoprazole (PROTONIX) 40 mg tablet   No No   Sig: Take 1 tablet (40 mg total) by mouth daily   umeclidinium-vilanterol (Anoro Ellipta) 62 5-25 mcg/actuation inhaler   No No   Sig: Inhale 1 puff daily   warfarin (COUMADIN) 3 mg tablet   No No   Sig: Take 1 tablet (3 mg total) by mouth daily      Facility-Administered Medications: None       Past Medical History:   Diagnosis Date   • Anemia    • Arthritis    • Cancer of kidney (HCC)    • Chronic kidney disease    • Diabetes mellitus (HCC)    • Disease of thyroid gland    • HLD (hyperlipidemia)    • Hypertension    • Ovarian cancer ([facility] [address] )    • Pneumonia        Past Surgical History:   Procedure Laterality Date   • CHOLECYSTECTOMY     • CT GUIDED PERC DRAINAGE CATHETER PLACEMENT  2016   • GALLBLADDER SURGERY  2004   • HYSTERECTOMY  2018   • NEPHRECTOMY Right 2018       Family History   Problem Relation Age of Onset   • No Known Problems Mother    • No Known Problems Father      I have reviewed and agree with the history as documented  E-Cigarette/Vaping   • E-Cigarette Use Never User      E-Cigarette/Vaping Substances   • Nicotine No    • THC Yes    • CBD No    • Flavoring No      Social History     Tobacco Use   • Smoking status: Former     Packs/day: 3 00     Years: 30 00     Pack years: 90 00     Types: Cigarettes     Quit date: 10/22/2010     Years since quittin 1   • Smokeless tobacco: Former     Quit date: 2011   • Tobacco comments:     quit approx   9 years ago   Vaping Use   • Vaping Use: Never used   Substance Use Topics   • Alcohol use: Never     Comment: n/a   • Drug use: Yes     Types: Marijuana     Comment: smokes with girlfriend when anxious       Review of Systems   Constitutional: Positive for chills  Respiratory: Negative for cough and shortness of breath  Cardiovascular: Negative for chest pain  Gastrointestinal: Negative for abdominal pain, diarrhea, nausea and vomiting  Genitourinary: Negative for dysuria and flank pain  Musculoskeletal: Negative for back pain  Skin: Negative for color change  Neurological: Negative for light-headedness and headaches  Psychiatric/Behavioral: Negative for confusion  All other systems reviewed and are negative  Physical Exam  Physical Exam  Vitals and nursing note reviewed  Constitutional:       General: She is not in acute distress  Appearance: She is well-developed  She is obese  She is not ill-appearing or toxic-appearing  HENT:      Head: Normocephalic and atraumatic  Mouth/Throat:      Mouth: Mucous membranes are moist    Eyes:      Conjunctiva/sclera: Conjunctivae normal    Cardiovascular:      Rate and Rhythm: Normal rate and regular rhythm  Pulses: Normal pulses  Heart sounds: No murmur heard  Pulmonary:      Effort: Pulmonary effort is normal  No respiratory distress  Breath sounds: Normal breath sounds  Abdominal:      General: Bowel sounds are normal       Palpations: Abdomen is soft  Tenderness: There is no abdominal tenderness  Musculoskeletal:         General: No swelling  Cervical back: Neck supple  Right lower leg: Edema present  Left lower leg: Edema present  Comments: Chronic lower extremity edema   Skin:     General: Skin is warm and dry  Capillary Refill: Capillary refill takes less than 2 seconds  Neurological:      General: No focal deficit present  Mental Status: She is alert and oriented to person, place, and time  Mental status is at baseline  Sensory: No sensory deficit  Motor: No weakness     Psychiatric:         Mood and Affect: Mood normal          Vital Signs  ED Triage Vitals Temperature Pulse Respirations Blood Pressure SpO2   12/10/22 0623 12/10/22 0623 12/10/22 0623 12/10/22 0623 12/10/22 0623   100 1 °F (37 8 °C) (!) 111 22 148/100 100 %      Temp Source Heart Rate Source Patient Position - Orthostatic VS BP Location FiO2 (%)   12/10/22 0623 12/10/22 0623 12/10/22 0623 12/10/22 0623 --   Oral Monitor Sitting Left arm       Pain Score       12/10/22 0635       Med Not Given for Pain - for MAR use only           Vitals:    12/10/22 0623   BP: 148/100   Pulse: (!) 111   Patient Position - Orthostatic VS: Sitting         Visual Acuity      ED Medications  Medications   acetaminophen (TYLENOL) tablet 650 mg (650 mg Oral Given 12/10/22 0635)       Diagnostic Studies  Results Reviewed     Procedure Component Value Units Date/Time    CBC and differential [[id number]] Collected: 12/10/22 0639    Lab Status: No result Specimen: Blood from Arm, Left     Comprehensive metabolic panel [[id number]] Collected: 12/10/22 0639    Lab Status: No result Specimen: Blood from Arm, Left     FLU/RSV/COVID - if FLU/RSV clinically relevant [[id number]] Collected: 12/10/22 2267    Lab Status: In process Specimen: Nares from Nose Updated: 12/10/22 0638    UA w Reflex to Microscopic w Reflex to Culture [[id number]]     Lab Status: No result Specimen: Urine     Fingerstick Glucose (POCT) [[id number]]  (Abnormal) Collected: 12/10/22 0625    Lab Status: Final result Updated: 12/10/22 0627     POC Glucose 320 mg/dl                  XR chest 1 view portable    (Results Pending)              Procedures  Procedures         ED Course  ED Course as of 12/11/22 0006   Sat Dec 10, 2022   0740 Patient just as bedside nurse how much longer she will be in the emergency department because she feels fine and would like to go home  Reviewed her labs which shows a stable hemoglobin at 7 2 and a slight bump in her creatinine above her baseline  Awaiting results of viral panel at this time  MDM  Number of Diagnoses or Management Options     Amount and/or Complexity of Data Reviewed  Clinical lab tests: ordered and reviewed  Tests in the radiology section of CPT®: ordered and reviewed  Review and summarize past medical records: yes    Risk of Complications, Morbidity, and/or Mortality  Presenting problems: moderate  Diagnostic procedures: moderate  Management options: moderate  General comments:   Is a 79-year-old female with a history of multiple chronic medical issues presenting with an acute onset of chills  Patient's chills resolved and she has no other systemic symptoms  Labs and imaging reviewed with patient  She is repeatedly asking to be discharged to home  I have informed her that we had here to find an etiology for her presenting symptoms and she may require a more extensive work-up  Patient declined and states that she will just follow-up with her primary doctor because she just went an extensive amount of time in the hospital   Plan at this time will be to discharge her to home with follow-up plan and strict return precautions  Patient understands and agrees with plan as discussed and all questions answered prior to discharge  Patient Progress  Patient progress: stable      Disposition  Final diagnoses:   None     ED Disposition     None      Follow-up Information    None         Patient's Medications   Discharge Prescriptions    No medications on file       No discharge procedures on file      PDMP Review       Value Time User    PDMP Reviewed  Yes 11/25/2022  9:45 AM Guerita Burnett, [facility]           ED Provider  Electronically Signed by           Adis Camara MD  12/11/22 0048

## 2022-12-10 NOTE — ED NOTES
3:00pm EST! [address] [facility] is transporting pt to residence       Telma Elder RN  12/10/22 1101

## 2022-12-10 NOTE — ED NOTES
Patient repositioned with assist of [id number] , [address] [place] [address]  12/10/22 1118

## 2022-12-10 NOTE — DISCHARGE INSTRUCTIONS
Follow-up with your primary care physician as discussed  Return to the emergency department anytime for any new or worsening issues

## 2022-12-11 ENCOUNTER — HOME CARE VISIT (OUTPATIENT)
Dept: HOME HEALTH SERVICES | Facility: HOME HEALTHCARE | Age: 62
End: 2022-12-11

## 2022-12-11 ENCOUNTER — HOSPITAL ENCOUNTER (INPATIENT)
Facility: HOSPITAL | Age: 62
LOS: 3 days | Discharge: HOME | End: 2022-12-14
Attending: EMERGENCY MEDICINE | Admitting: INTERNAL MEDICINE

## 2022-12-11 ENCOUNTER — APPOINTMENT (EMERGENCY)
Dept: CT IMAGING | Facility: HOSPITAL | Age: 62
End: 2022-12-11

## 2022-12-11 ENCOUNTER — APPOINTMENT (EMERGENCY)
Dept: RADIOLOGY | Facility: HOSPITAL | Age: 62
End: 2022-12-11

## 2022-12-11 DIAGNOSIS — L03.311 ABDOMINAL WALL CELLULITIS: ICD-10-CM

## 2022-12-11 DIAGNOSIS — D64.9 ANEMIA: ICD-10-CM

## 2022-12-11 DIAGNOSIS — E66.01 MORBID OBESITY WITH BMI OF 50.0-59.9, ADULT (HCC): ICD-10-CM

## 2022-12-11 DIAGNOSIS — R06.02 SOB (SHORTNESS OF BREATH): ICD-10-CM

## 2022-12-11 DIAGNOSIS — L03.311 CELLULITIS OF ABDOMINAL WALL: Primary | ICD-10-CM

## 2022-12-11 DIAGNOSIS — L03.90 CELLULITIS: ICD-10-CM

## 2022-12-11 PROBLEM — N18.4 CKD (CHRONIC KIDNEY DISEASE) STAGE 4, GFR 15-29 ML/MIN (HCC): Status: ACTIVE | Noted: 2022-12-11

## 2022-12-11 LAB
2HR DELTA HS TROPONIN: 0 NG/L
4HR DELTA HS TROPONIN: -4 NG/L
ABO GROUP BLD BPU: NORMAL
ABO GROUP BLD: NORMAL
ALBUMIN SERPL BCP-MCNC: 2.5 G/DL (ref 3.5–5)
ALP SERPL-CCNC: 112 U/L (ref 34–104)
ALT SERPL W P-5'-P-CCNC: 11 U/L (ref 7–52)
ANION GAP SERPL CALCULATED.3IONS-SCNC: -2 MMOL/L (ref 4–13)
APTT PPP: 121 SECONDS (ref 23–37)
APTT PPP: 27 SECONDS (ref 23–37)
APTT PPP: 41 SECONDS (ref 23–37)
AST SERPL W P-5'-P-CCNC: 13 U/L (ref 13–39)
BASE EX.OXY STD BLDV CALC-SCNC: 91.1 % (ref 60–80)
BASE EXCESS BLDV CALC-SCNC: 6.5 MMOL/L
BASOPHILS # BLD AUTO: 0.01 THOUSANDS/ÂΜL (ref 0–0.1)
BASOPHILS NFR BLD AUTO: 0 % (ref 0–1)
BILIRUB SERPL-MCNC: 0.32 MG/DL (ref 0.2–1)
BLD GP AB SCN SERPL QL: NEGATIVE
BLD SMEAR INTERP: NORMAL
BNP SERPL-MCNC: 83 PG/ML (ref 0–100)
BPU ID: NORMAL
BUN SERPL-MCNC: 22 MG/DL (ref 5–25)
CALCIUM ALBUM COR SERPL-MCNC: 9.5 MG/DL (ref 8.3–10.1)
CALCIUM SERPL-MCNC: 8.3 MG/DL (ref 8.4–10.2)
CARDIAC TROPONIN I PNL SERPL HS: 17 NG/L
CARDIAC TROPONIN I PNL SERPL HS: 21 NG/L
CARDIAC TROPONIN I PNL SERPL HS: 21 NG/L
CHLORIDE SERPL-SCNC: 108 MMOL/L (ref 96–108)
CO2 SERPL-SCNC: 32 MMOL/L (ref 21–32)
CREAT SERPL-MCNC: 2.19 MG/DL (ref 0.6–1.3)
CROSSMATCH: NORMAL
D DIMER PPP FEU-MCNC: 0.52 UG/ML FEU
EOSINOPHIL # BLD AUTO: 0.19 THOUSAND/ÂΜL (ref 0–0.61)
EOSINOPHIL NFR BLD AUTO: 4 % (ref 0–6)
ERYTHROCYTE [DISTWIDTH] IN BLOOD BY AUTOMATED COUNT: 19.2 % (ref 11.6–15.1)
ERYTHROCYTE [DISTWIDTH] IN BLOOD BY AUTOMATED COUNT: 19.5 % (ref 11.6–15.1)
GFR SERPL CREATININE-BSD FRML MDRD: 23 ML/MIN/1.73SQ M
GLUCOSE SERPL-MCNC: 118 MG/DL (ref 65–140)
GLUCOSE SERPL-MCNC: 125 MG/DL (ref 65–140)
GLUCOSE SERPL-MCNC: 148 MG/DL (ref 65–140)
GLUCOSE SERPL-MCNC: 188 MG/DL (ref 65–140)
HCO3 BLDV-SCNC: 33.3 MMOL/L (ref 24–30)
HCT VFR BLD AUTO: 22.6 % (ref 34.8–46.1)
HCT VFR BLD AUTO: 25 % (ref 34.8–46.1)
HCT VFR BLD AUTO: 27.5 % (ref 34.8–46.1)
HGB BLD-MCNC: 6.3 G/DL (ref 11.5–15.4)
HGB BLD-MCNC: 7 G/DL (ref 11.5–15.4)
HGB BLD-MCNC: 7.7 G/DL (ref 11.5–15.4)
IMM GRANULOCYTES # BLD AUTO: 0.03 THOUSAND/UL (ref 0–0.2)
IMM GRANULOCYTES NFR BLD AUTO: 1 % (ref 0–2)
INR PPP: 1.58 (ref 0.84–1.19)
INR PPP: 1.77 (ref 0.84–1.19)
LACTATE SERPL-SCNC: 1.1 MMOL/L (ref 0.5–2)
LACTATE SERPL-SCNC: 1.4 MMOL/L (ref 0.5–2)
LDH SERPL-CCNC: 267 U/L (ref 140–271)
LYMPHOCYTES # BLD AUTO: 0.72 THOUSANDS/ÂΜL (ref 0.6–4.47)
LYMPHOCYTES NFR BLD AUTO: 17 % (ref 14–44)
MAGNESIUM SERPL-MCNC: 2 MG/DL (ref 1.9–2.7)
MCH RBC QN AUTO: 28.6 PG (ref 26.8–34.3)
MCH RBC QN AUTO: 28.8 PG (ref 26.8–34.3)
MCHC RBC AUTO-ENTMCNC: 27.9 G/DL (ref 31.4–37.4)
MCHC RBC AUTO-ENTMCNC: 28 G/DL (ref 31.4–37.4)
MCV RBC AUTO: 103 FL (ref 82–98)
MCV RBC AUTO: 103 FL (ref 82–98)
MONOCYTES # BLD AUTO: 0.49 THOUSAND/ÂΜL (ref 0.17–1.22)
MONOCYTES NFR BLD AUTO: 11 % (ref 4–12)
NEUTROPHILS # BLD AUTO: 2.91 THOUSANDS/ÂΜL (ref 1.85–7.62)
NEUTS SEG NFR BLD AUTO: 67 % (ref 43–75)
NRBC BLD AUTO-RTO: 1 /100 WBCS
O2 CT BLDV-SCNC: 9.3 ML/DL
PCO2 BLDV: 65.6 MM HG (ref 42–50)
PH BLDV: 7.32 [PH] (ref 7.3–7.4)
PLATELET # BLD AUTO: 194 THOUSANDS/UL (ref 149–390)
PLATELET # BLD AUTO: 214 THOUSANDS/UL (ref 149–390)
PMV BLD AUTO: 10.3 FL (ref 8.9–12.7)
PMV BLD AUTO: 10.8 FL (ref 8.9–12.7)
PO2 BLDV: 69.7 MM HG (ref 35–45)
POTASSIUM SERPL-SCNC: 4.6 MMOL/L (ref 3.5–5.3)
PROCALCITONIN SERPL-MCNC: 0.23 NG/ML
PROT SERPL-MCNC: 5.6 G/DL (ref 6.4–8.4)
PROTHROMBIN TIME: 19.1 SECONDS (ref 11.6–14.5)
PROTHROMBIN TIME: 20.9 SECONDS (ref 11.6–14.5)
RBC # BLD AUTO: 2.2 MILLION/UL (ref 3.81–5.12)
RBC # BLD AUTO: 2.67 MILLION/UL (ref 3.81–5.12)
RH BLD: POSITIVE
SODIUM SERPL-SCNC: 138 MMOL/L (ref 135–147)
SPECIMEN EXPIRATION DATE: NORMAL
UNIT DISPENSE STATUS: NORMAL
UNIT PRODUCT CODE: NORMAL
UNIT PRODUCT VOLUME: 350 ML
UNIT RH: NORMAL
VANCOMYCIN SERPL-MCNC: 15.2 UG/ML (ref 10–20)
WBC # BLD AUTO: 3.44 THOUSAND/UL (ref 4.31–10.16)
WBC # BLD AUTO: 4.35 THOUSAND/UL (ref 4.31–10.16)

## 2022-12-11 PROCEDURE — 30233N1 TRANSFUSION OF NONAUTOLOGOUS RED BLOOD CELLS INTO PERIPHERAL VEIN, PERCUTANEOUS APPROACH: ICD-10-PCS | Performed by: INTERNAL MEDICINE

## 2022-12-11 RX ORDER — PANTOPRAZOLE SODIUM 40 MG/1
40 TABLET, DELAYED RELEASE ORAL DAILY
Status: DISCONTINUED | OUTPATIENT
Start: 2022-12-11 | End: 2022-12-11

## 2022-12-11 RX ORDER — INSULIN LISPRO 100 [IU]/ML
1-5 INJECTION, SOLUTION INTRAVENOUS; SUBCUTANEOUS
Status: DISCONTINUED | OUTPATIENT
Start: 2022-12-11 | End: 2022-12-14 | Stop reason: HOSPADM

## 2022-12-11 RX ORDER — FERROUS SULFATE 325(65) MG
325 TABLET ORAL
Status: DISCONTINUED | OUTPATIENT
Start: 2022-12-11 | End: 2022-12-14 | Stop reason: HOSPADM

## 2022-12-11 RX ORDER — ALBUTEROL SULFATE 2.5 MG/3ML
2.5 SOLUTION RESPIRATORY (INHALATION) EVERY 4 HOURS PRN
Status: DISCONTINUED | OUTPATIENT
Start: 2022-12-11 | End: 2022-12-14 | Stop reason: HOSPADM

## 2022-12-11 RX ORDER — PANTOPRAZOLE SODIUM 40 MG/10ML
40 INJECTION, POWDER, LYOPHILIZED, FOR SOLUTION INTRAVENOUS ONCE
Status: COMPLETED | OUTPATIENT
Start: 2022-12-11 | End: 2022-12-11

## 2022-12-11 RX ORDER — METOPROLOL TARTRATE 50 MG/1
50 TABLET, FILM COATED ORAL 2 TIMES DAILY
Status: DISCONTINUED | OUTPATIENT
Start: 2022-12-11 | End: 2022-12-11

## 2022-12-11 RX ORDER — HYDROXYZINE HYDROCHLORIDE 25 MG/1
50 TABLET, FILM COATED ORAL
Status: DISCONTINUED | OUTPATIENT
Start: 2022-12-11 | End: 2022-12-14 | Stop reason: HOSPADM

## 2022-12-11 RX ORDER — HEPARIN SODIUM 10000 [USP'U]/100ML
3-20 INJECTION, SOLUTION INTRAVENOUS
Status: DISCONTINUED | OUTPATIENT
Start: 2022-12-11 | End: 2022-12-14

## 2022-12-11 RX ORDER — NYSTATIN 100000 [USP'U]/G
POWDER TOPICAL 2 TIMES DAILY
Status: DISCONTINUED | OUTPATIENT
Start: 2022-12-11 | End: 2022-12-12

## 2022-12-11 RX ORDER — LEVOTHYROXINE SODIUM 0.1 MG/1
100 TABLET ORAL
Status: DISCONTINUED | OUTPATIENT
Start: 2022-12-11 | End: 2022-12-14 | Stop reason: HOSPADM

## 2022-12-11 RX ORDER — ACETAMINOPHEN 325 MG/1
650 TABLET ORAL EVERY 6 HOURS PRN
Status: DISCONTINUED | OUTPATIENT
Start: 2022-12-11 | End: 2022-12-14 | Stop reason: HOSPADM

## 2022-12-11 RX ORDER — FAMOTIDINE 20 MG/1
20 TABLET, FILM COATED ORAL DAILY
Status: DISCONTINUED | OUTPATIENT
Start: 2022-12-11 | End: 2022-12-14 | Stop reason: HOSPADM

## 2022-12-11 RX ORDER — ONDANSETRON 2 MG/ML
4 INJECTION INTRAMUSCULAR; INTRAVENOUS EVERY 6 HOURS PRN
Status: DISCONTINUED | OUTPATIENT
Start: 2022-12-11 | End: 2022-12-14 | Stop reason: HOSPADM

## 2022-12-11 RX ORDER — GUAIFENESIN 600 MG/1
1200 TABLET, EXTENDED RELEASE ORAL 2 TIMES DAILY
Status: DISCONTINUED | OUTPATIENT
Start: 2022-12-11 | End: 2022-12-14 | Stop reason: HOSPADM

## 2022-12-11 RX ORDER — LORAZEPAM 0.5 MG/1
0.5 TABLET ORAL
Status: DISCONTINUED | OUTPATIENT
Start: 2022-12-11 | End: 2022-12-14 | Stop reason: HOSPADM

## 2022-12-11 RX ORDER — WARFARIN SODIUM 3 MG/1
3 TABLET ORAL EVERY 24 HOURS
Status: DISCONTINUED | OUTPATIENT
Start: 2022-12-11 | End: 2022-12-11

## 2022-12-11 RX ORDER — ATORVASTATIN CALCIUM 10 MG/1
10 TABLET, FILM COATED ORAL DAILY
Status: DISCONTINUED | OUTPATIENT
Start: 2022-12-11 | End: 2022-12-14 | Stop reason: HOSPADM

## 2022-12-11 RX ORDER — AMLODIPINE BESYLATE 5 MG/1
5 TABLET ORAL DAILY
Status: DISCONTINUED | OUTPATIENT
Start: 2022-12-11 | End: 2022-12-11

## 2022-12-11 RX ORDER — HEPARIN SODIUM 1000 [USP'U]/ML
4000 INJECTION, SOLUTION INTRAVENOUS; SUBCUTANEOUS ONCE
Status: COMPLETED | OUTPATIENT
Start: 2022-12-11 | End: 2022-12-11

## 2022-12-11 RX ORDER — PANTOPRAZOLE SODIUM 40 MG/10ML
40 INJECTION, POWDER, LYOPHILIZED, FOR SOLUTION INTRAVENOUS EVERY 12 HOURS SCHEDULED
Status: DISCONTINUED | OUTPATIENT
Start: 2022-12-11 | End: 2022-12-13

## 2022-12-11 RX ADMIN — HEPARIN SODIUM 4000 UNITS: 1000 INJECTION INTRAVENOUS; SUBCUTANEOUS at 11:46

## 2022-12-11 RX ADMIN — FERROUS SULFATE TAB 325 MG (65 MG ELEMENTAL FE) 325 MG: 325 (65 FE) TAB at 11:16

## 2022-12-11 RX ADMIN — HEPARIN SODIUM 11.1 UNITS/KG/HR: 10000 INJECTION, SOLUTION INTRAVENOUS at 11:46

## 2022-12-11 RX ADMIN — FAMOTIDINE 20 MG: 20 TABLET ORAL at 11:16

## 2022-12-11 RX ADMIN — VANCOMYCIN HYDROCHLORIDE 2000 MG: 10 INJECTION, POWDER, LYOPHILIZED, FOR SOLUTION INTRAVENOUS at 03:31

## 2022-12-11 RX ADMIN — GUAIFENESIN 1200 MG: 600 TABLET ORAL at 18:12

## 2022-12-11 RX ADMIN — CEFEPIME 2000 MG: 2 INJECTION, POWDER, FOR SOLUTION INTRAVENOUS at 02:53

## 2022-12-11 RX ADMIN — PANTOPRAZOLE SODIUM 40 MG: 40 INJECTION, POWDER, FOR SOLUTION INTRAVENOUS at 02:50

## 2022-12-11 RX ADMIN — LORAZEPAM 0.5 MG: 0.5 TABLET ORAL at 21:40

## 2022-12-11 RX ADMIN — VANCOMYCIN HYDROCHLORIDE 1250 MG: 5 INJECTION, POWDER, LYOPHILIZED, FOR SOLUTION INTRAVENOUS at 20:06

## 2022-12-11 RX ADMIN — LEVOTHYROXINE SODIUM 100 MCG: 100 TABLET ORAL at 11:16

## 2022-12-11 RX ADMIN — PANTOPRAZOLE SODIUM 40 MG: 40 INJECTION, POWDER, FOR SOLUTION INTRAVENOUS at 11:46

## 2022-12-11 RX ADMIN — PANTOPRAZOLE SODIUM 40 MG: 40 INJECTION, POWDER, FOR SOLUTION INTRAVENOUS at 21:40

## 2022-12-11 RX ADMIN — ATORVASTATIN CALCIUM 10 MG: 10 TABLET, FILM COATED ORAL at 11:16

## 2022-12-11 RX ADMIN — GUAIFENESIN 1200 MG: 600 TABLET ORAL at 11:16

## 2022-12-11 RX ADMIN — HYDROXYZINE HYDROCHLORIDE 50 MG: 25 TABLET ORAL at 21:40

## 2022-12-11 NOTE — ASSESSMENT & PLAN NOTE
Lab Results   Component Value Date    HGBA1C 7 0 (H) 11/01/2022       Recent Labs     12/10/22  0625   POCGLU 320*     · BG has been more elevated likely from infection      · Start ISS TID with meals and qHS  · Hypoglycemia protocol  · Diabetic diet  · Adjust as needed to maintain BG goal 140-180    Blood Sugar Average: Last 72 hrs:

## 2022-12-11 NOTE — ED NOTES
Pt transported to  412 non tele, call bell within reach, receiving RN at pt bedside     Dash Knowles  12/11/22 3546

## 2022-12-11 NOTE — ED NOTES
RN at bedside attempting to ultrasound larger IV inorder to begin [address], RN  12/11/22 5245

## 2022-12-11 NOTE — ASSESSMENT & PLAN NOTE
Lab Results   Component Value Date    EGFR 23 12/11/2022    EGFR 22 12/10/2022    EGFR 29 12/02/2022    CREATININE 2 19 (H) 12/11/2022    CREATININE 2 22 (H) 12/10/2022    CREATININE 1 79 (H) 12/02/2022   sCR at baseline    Continue to monitor

## 2022-12-11 NOTE — QUICK NOTE
Patient now meeting criteria for sepsis with tachycardia and leukopenia with WBC ct 3 44, suspected source abd wall cellulitis  She was already given broad spectrum abx with Vanc and cefepime and blood cx were obtained in the ED prior to administration of abx  Will continue IV vancomycin as ordered on admission  F/u blood cultures  Check LA

## 2022-12-11 NOTE — CONSULTS
Consultation - [facility]) [facility]  Kenn Cutler 60 y o  female MRN: [id number]  Unit/Bed#: W -01 Encounter: [id number]        Inpatient consult to gastroenterology  Consult performed by: Tamiko Sweet PA-C  Consult ordered by: Delphine Quintanilla MD          Reason for Consult / Principal Problem: Macrocytic anemia    ASSESSMENT and PLAN:    Principal Problem:    Abdominal wall cellulitis  Active Problems:    Paroxysmal atrial fibrillation (HCC)    COPD with asthma ([place] [address] )    Morbid obesity with BMI of 50 0-59 9, adult ([place] [address] )    Diabetes mellitus type 2 in obese ([place] [address] )    Acute on chronic respiratory failure with hypoxia and hypercapnia (HCC)    Acute blood loss anemia    CKD (chronic kidney disease) stage 4, GFR 15-29 ml/min ([place] [address] )    #1  Macrocytic anemia: Suspect this is multifactorial   Patient currently is having no signs of active GI bleeding  Denies any melena or blood in the stool  Reports that her stools are brown  She has had extensive GI work-up  Most recently she had a colonoscopy on 12/2 which was completely unremarkable with no signs of bleeding  She also had an EGD with push enteroscopy on 11/28 without any sources of bleeding  Colonoscopy on that same day was notable for old blood throughout the entire colon  EGD with push enteroscopy on 11/25 was notable for some AVMs in the duodenum and stomach  The concern is that patient may have oozing small bowel AVMs in the setting of anticoagulation use  -At this time with no signs of active GI bleeding, I do not feel that proceeding with another EGD or colonoscopy is warranted  Patient ultimately needs a capsule endoscopy  This should be done as an outpatient and we can try to move up patient's appointment due to do this more urgently  We would only do this inpatient if patient was having active signs of bleeding without a source on upper endoscopy or colonoscopy    -In the meantime would recommend transfusion as necessary and further work-up for other sources of anemia as well   -------------------------------------------------------------------------------------------------------------------    HPI: This is a 58-year-old female with a history of CKD, diabetes, hyperlipidemia, hypertension, ovarian cancer, chronic respiratory failure, and atrial fibrillation on Coumadin who presented to the hospital secondary to weakness, elevated blood sugar, and abdominal pain  She was diagnosed with a possible abdominal wall cellulitis  She was also noted to have slight drop in hemoglobin from baseline in the sevens down to 6 3  Patient recently was seen by GI and had extensive work-up  On 1125 she had an EGD with push enteroscopy with some AVMs in the duodenum and stomach which were ablated  She had a colonoscopy and endoscopy on 1128 which was notable only for old blood in the colon  Repeat colonoscopy on 12/2 was unremarkable  Patient was scheduled for an outpatient capsule which is currently arranged for the end of January  Patient denies any signs of melena or blood in stool  Her stools have been brown  Denies any nausea or vomiting  Denies any vomiting of blood  Reports abdominal pain related to her abdominal wall cellulitis  Denies any significant NSAID use  She is on Coumadin regularly  REVIEW OF SYSTEMS:    CONSTITUTIONAL: Denies any fever, chills, or rigors  Good appetite, and no recent weight loss  HEENT: No earache or tinnitus  Denies hearing loss or visual disturbances  CARDIOVASCULAR: No chest pain or palpitations  RESPIRATORY: Denies any cough, hemoptysis, shortness of breath or dyspnea on exertion  GASTROINTESTINAL: As noted in the History of Present Illness  GENITOURINARY: No problems with urination  Denies any hematuria or dysuria  NEUROLOGIC: No dizziness or vertigo, denies headaches  MUSCULOSKELETAL: Denies any muscle or joint pain  SKIN: Denies skin rashes or itching     ENDOCRINE: Denies excessive thirst  Denies intolerance to heat or cold  PSYCHOSOCIAL: Denies depression or anxiety  Denies any recent memory loss  Historical Information   Past Medical History:   Diagnosis Date   • Anemia    • Arthritis    • Cancer of kidney ([place] [address] )    • Chronic kidney disease    • Diabetes mellitus ([place] [address] )    • Disease of thyroid gland    • HLD (hyperlipidemia)    • Hypertension    • Ovarian cancer ([place] [address] )    • Pneumonia      Past Surgical History:   Procedure Laterality Date   • CHOLECYSTECTOMY     • CT GUIDED PERC DRAINAGE CATHETER PLACEMENT  2016   • GALLBLADDER SURGERY  2004   • HYSTERECTOMY  2018   • NEPHRECTOMY Right 2018     Social History   Social History     Substance and Sexual Activity   Alcohol Use Never    Comment: n/a     Social History     Substance and Sexual Activity   Drug Use Yes   • Types: Marijuana    Comment: smokes with girlfriend when anxious     Social History     Tobacco Use   Smoking Status Former   • Packs/day: 3 00   • Years: 30 00   • Pack years: 90 00   • Types: Cigarettes   • Quit date: 10/22/2010   • Years since quittin 1   Smokeless Tobacco Former   • Quit date: 2011   Tobacco Comments    quit approx   9 years ago     Family History   Problem Relation Age of Onset   • No Known Problems Mother    • No Known Problems Father        Meds/Allergies     Medications Prior to Admission   Medication   • albuterol (2 5 mg/3 mL) 0 083 % nebulizer solution   • albuterol (PROVENTIL HFA,VENTOLIN HFA) 90 mcg/act inhaler   • amLODIPine (NORVASC) 5 mg tablet   • atorvastatin (LIPITOR) 10 mg tablet   • Blood Glucose Monitoring Suppl (Embrace Pro Glucose Meter) SAVANNAH   • collagenase (SANTYL) ointment   • Embrace Lancets Ultra Thin 30G MISC   • famotidine (PEPCID) 20 mg tablet   • ferrous sulfate 325 (65 Fe) mg tablet   • Guaifenesin 1200 MG TB12   • hydrOXYzine HCL (ATARAX) 50 mg tablet   • levothyroxine 100 mcg tablet   • LORazepam (ATIVAN) 0 5 mg tablet   • metoprolol tartrate (LOPRESSOR) 50 mg tablet   • pantoprazole (PROTONIX) 40 mg tablet   • umeclidinium-vilanterol (Anoro Ellipta) 62 5-25 mcg/actuation inhaler   • warfarin (COUMADIN) 3 mg tablet     Current Facility-Administered Medications   Medication Dose Route Frequency   • acetaminophen (TYLENOL) tablet 650 mg  650 mg Oral Q6H PRN   • albuterol inhalation solution 2 5 mg  2 5 mg Nebulization Q4H PRN   • atorvastatin (LIPITOR) tablet 10 mg  10 mg Oral Daily   • famotidine (PEPCID) tablet 20 mg  20 mg Oral Daily   • ferrous sulfate tablet 325 mg  325 mg Oral Daily With Breakfast   • guaiFENesin (MUCINEX) 12 hr tablet 1,200 mg  1,200 mg Oral BID   • heparin (porcine) 25,000 units in 0 45% NaCl 250 mL infusion (premix)  3-20 Units/kg/hr (Order-Specific) Intravenous Titrated   • hydrOXYzine HCL (ATARAX) tablet 50 mg  50 mg Oral HS   • insulin lispro (HumaLOG) 100 units/mL subcutaneous injection 1-5 Units  1-5 Units Subcutaneous TID AC   • insulin lispro (HumaLOG) 100 units/mL subcutaneous injection 1-5 Units  1-5 Units Subcutaneous HS   • levothyroxine tablet 100 mcg  100 mcg Oral Early Morning   • LORazepam (ATIVAN) tablet 0 5 mg  0 5 mg Oral HS   • metoprolol tartrate (LOPRESSOR) partial tablet 12 5 mg  12 5 mg Oral BID   • nystatin (MYCOSTATIN) powder   Topical BID   • ondansetron (ZOFRAN) injection 4 mg  4 mg Intravenous Q6H PRN   • pantoprazole (PROTONIX) injection 40 mg  40 mg Intravenous Q12H LINO   • umeclidinium-vilanterol 62 5-25 mcg/actuation inhaler 1 puff  1 puff Inhalation Daily       No Known Allergies        Objective     Blood pressure 106/67, pulse (!) 109, temperature (!) 97 4 °F (36 3 °C), resp  rate 17, height 5' 4" (1 626 m), weight (!) 147 kg (324 lb), SpO2 98 %, not currently breastfeeding        Intake/Output Summary (Last 24 hours) at 12/11/2022 1218  Last data filed at 12/11/2022 0704  Gross per 24 hour   Intake 900 ml   Output --   Net 900 ml         PHYSICAL EXAM:      General Appearance:   Alert, cooperative, no distress, appears stated age;  chronically ill appearing   HEENT:   Normocephalic, atraumatic, anicteric, no oropharyngeal thrush present      Neck:  Supple, symmetrical, trachea midline, no adenopathy;    thyroid: no enlargement/tenderness/nodules; no carotid  bruit or JVD    Lungs:   Clear to auscultation bilaterally; no rales, rhonchi or wheezing; respirations unlabored    Heart[de-identified]   S1 and S2 normal; tachycardic, regular rhythm; no murmur, rub, or gallop  Abdomen:   Soft, LLQ tenderness to palpation, obese abdomen, non-distended; normal bowel sounds; no masses, no organomegaly    Genitalia:   Deferred    Rectal:   Deferred    Extremities:  No cyanosis, clubbing or edema    Pulses:  2+ and symmetric all extremities    Skin:  Skin color, texture, turgor normal, no rashes or lesions    Lymph nodes:  No palpable cervical, axillary or inguinal lymphadenopathy        Lab Results:   Results from last 7 days   Lab Units 12/11/22  0130   WBC Thousand/uL 4 35   HEMOGLOBIN g/dL 6 3*   HEMATOCRIT % 22 6*   PLATELETS Thousands/uL 194   NEUTROS PCT % 67   LYMPHS PCT % 17   MONOS PCT % 11   EOS PCT % 4     Results from last 7 days   Lab Units 12/11/22  0130   POTASSIUM mmol/L 4 6   CHLORIDE mmol/L 108   CO2 mmol/L 32   BUN mg/dL 22   CREATININE mg/dL 2 19*   CALCIUM mg/dL 8 3*   ALK PHOS U/L 112*   ALT U/L 11   AST U/L 13     Results from last 7 days   Lab Units 12/11/22  0130   INR  1 77*           Imaging Studies: I have personally reviewed pertinent imaging studies  CT chest abdomen pelvis wo contrast    Result Date: 12/11/2022  Impression: Inflammatory changes overlying the right ventral abdominal wall hernia suggestive of infection such as cellulitis Workstation performed: [id number]     XR chest 1 view portable    Result Date: 12/11/2022  Impression: Mild bibasilar atelectasis   Workstation performed: [id number]           Patient was seen and examined by Dr Cheryl Abdul decisions were made by Dr Bebeto Infante  Thank you for allowing us to participate in the care of this present patient  We will follow-up with you closely

## 2022-12-11 NOTE — ASSESSMENT & PLAN NOTE
· Has been evaluated multiple times in past with EGD and c-scope recently  Acute on chronic  No active evidence of bleeding at this time  Concern that this may be multifactorial - advanced CKD, hematologic? · Patient to be given 1u pRBC  Transfuse for Hgb <7 symptomatic anemia  H&H q8  · Hold AC - start heparin gtt  INR is subtherapeutic on coumadin  · Monitor for signs of bleeding  Check occult blood  · Check LDH, haptoglobin  Haptoglobin in the past has been normal   Check hemolysis smear    · Check B12 and folate with macrocytosis  · IV PPI BID  · Non-ulcerogenic diet  · GI consult

## 2022-12-11 NOTE — CASE COMMUNICATION
12 11 22  at 0940    call from answering service that pt is in hospital      I called caregiver back and there was no answer

## 2022-12-11 NOTE — SEPSIS NOTE
Sepsis Note   Jersey Rodriges 60 y o  female MRN: [id number]  Unit/Bed#: W -01 Encounter: [id number]       qSOFA     Row Name 12/11/22 1642 12/11/22 16:39:37 12/11/22 09:30:55 12/11/22 0910 12/11/22 0743    Altered mental status GCS < 15 -- -- -- -- --    Respiratory Rate > / =22 -- 0 0 0 0    Systolic BP < / =191 0 1 0 0 0    Q Sofa Score -- 1 0 0 0    Row Name 12/11/22 0704 12/11/22 0700 12/11/22 0645 12/11/22 0630 12/11/22 0600    Altered mental status GCS < 15 -- -- -- -- --    Respiratory Rate > / =22 0 -- -- -- --    Systolic BP < / =790 0 0 0 1 1    Q Sofa Score 0 0 0 1 1    Row Name 12/11/22 0545 12/11/22 0530 12/11/22 0515 12/11/22 0500 12/11/22 0450    Altered mental status GCS < 15 -- -- -- -- --    Respiratory Rate > / =22 -- -- -- -- 0    Systolic BP < / =094 1 1 1 1 1    Q Sofa Score 1 1 1 1 1    Row Name 12/11/22 0439 12/11/22 0415 12/11/22 0345 12/11/22 0330 12/11/22 0315    Altered mental status GCS < 15 -- -- -- -- --    Respiratory Rate > / =22 0 -- -- -- 1    Systolic BP < / =145 0 1 1 1 1    Q Sofa Score 0 2 2 2 2    Row Name 12/11/22 0245 12/11/22 0215 12/11/22 0200 12/11/22 0145 12/11/22 0130    Altered mental status GCS < 15 -- -- -- -- --    Respiratory Rate > / =22 -- 1 1 1 1    Systolic BP < / =687 1 1 1 1 1    Q Sofa Score 2 2 2 2 2    Row Name 12/11/22 0115 12/11/22 0112             Altered mental status GCS < 15 -- --       Respiratory Rate > / =22 -- 0       Systolic BP < / =294 1 1       Q Sofa Score 1 1                  Initial Sepsis Screening     Row Name 12/11/22 1840                Is the patient's history suggestive of a new or worsening infection? Yes (Proceed)  -SC        Suspected source of infection soft tissue  -SC        Are two or more of the following signs & symptoms of infection both present and new to the patient?  Yes (Proceed)  -SC        Indicate SIRS criteria Tachycardia > 90 bpm;Leukopenia (WBC < 4000 IJL)  -SC        If the answer is yes to both questions, suspicion of sepsis is present --        If severe sepsis is present AND tissue hypoperfusion perists in the hour after fluid resuscitation or lactate > 4, the patient meets criteria for SEPTIC SHOCK --        Are any of the following organ dysfunction criteria present within 6 hours of suspected infection and SIRS criteria that are NOT considered to be chronic conditions?  No  -SC        Organ dysfunction SBP < 90 mmHg  -SC        Date of presentation of severe sepsis --        Time of presentation of severe sepsis --        Tissue hypoperfusion persists in the hour after crystalloid fluid administration, evidenced, by either: --        Was hypotension present within one hour of the conclusion of crystalloid fluid administration? --        Date of presentation of septic shock --        Time of presentation of septic shock --              User Key  (r) = Recorded By, (t) = Taken By, (c) = Cosigned By    [address] Name Provider Type    SC Sharlette Goodell, MD Physician

## 2022-12-11 NOTE — PROGRESS NOTES
Vancomycin IV Pharmacy-to-Dose Consultation    Sweetie Segal is a 60 y o  female who is currently receiving vancomycin IV with management by the Pharmacy Consult service  Relevant clinical data and objective / subjective history reviewed  Vancomycin Assessment:  Indication: Soft tissue (goal -600, trough >10)    Status: hemodynamically stable  Micro:   12/10 FLU/RSV/COVID: negative  12/11 blood cx: in process  Renal Function: CKD, but serum creatinine up to 2 2 from 1 8  Days of Therapy: 1  Current Dose: 2000 mg IV x1 at 0330  Last Level: n/a    Vancomycin Plan:  New Dosing: change to 1250 mg (15 mg/kg AdjBW) IV daily PRN when random vancomycin level is less than or equal to 15  Next Level: random level 12/11 at 1600 with next lab draw  Renal Function Monitoring: daily BMP and UOP assessment    Pharmacy will continue to follow closely for s/sx of nephrotoxicity, infusion reactions and appropriateness of therapy  BMP and CBC will be ordered per protocol  We will continue to follow the patient's culture results and clinical progress daily      Thank you,  Chadwick Villanueva, PharmD, [facility] Pharmacist  [phone]

## 2022-12-11 NOTE — PLAN OF CARE
Problem: Potential for Falls  Goal: Patient will remain free of falls  Description: INTERVENTIONS:  - Educate patient/family on patient safety including physical limitations  - Instruct patient to call for assistance with activity   - Consult OT/PT to assist with strengthening/mobility   - Keep Call bell within reach  - Keep bed low and locked with side rails adjusted as appropriate  - Keep care items and personal belongings within reach  - Initiate and maintain comfort rounds  - Make Fall Risk Sign visible to staff  - Apply yellow socks and bracelet for high fall risk patients  - Consider moving patient to room near nurses station  Outcome: Progressing     Problem: PAIN - ADULT  Goal: Verbalizes/displays adequate comfort level or baseline comfort level  Description: Interventions:  - Encourage patient to monitor pain and request assistance  - Assess pain using appropriate pain scale  - Administer analgesics based on type and severity of pain and evaluate response  - Implement non-pharmacological measures as appropriate and evaluate response  - Consider cultural and social influences on pain and pain management  - Notify physician/advanced practitioner if interventions unsuccessful or patient reports new pain  Outcome: Progressing     Problem: INFECTION - ADULT  Goal: Absence or prevention of progression during hospitalization  Description: INTERVENTIONS:  - Assess and monitor for signs and symptoms of infection  - Monitor lab/diagnostic results  - Monitor all insertion sites, i e  indwelling lines, tubes, and drains  - Monitor endotracheal if appropriate and nasal secretions for changes in amount and color  - [place] appropriate cooling/warming therapies per order  - Administer medications as ordered  - Instruct and encourage patient and family to use good hand hygiene technique  - Identify and instruct in appropriate isolation precautions for identified infection/condition  Outcome: Progressing     Problem: SAFETY ADULT  Goal: Patient will remain free of falls  Description: INTERVENTIONS:  - Educate patient/family on patient safety including physical limitations  - Instruct patient to call for assistance with activity   - Consult OT/PT to assist with strengthening/mobility   - Keep Call bell within reach  - Keep bed low and locked with side rails adjusted as appropriate  - Keep care items and personal belongings within reach  - Initiate and maintain comfort rounds  - Make Fall Risk Sign visible to staff  - Apply yellow socks and bracelet for high fall risk patients  - Consider moving patient to room near nurses station  Outcome: Progressing  Goal: Maintain or return to baseline ADL function  Description: INTERVENTIONS:  -  Assess patient's ability to carry out ADLs; assess patient's baseline for ADL function and identify physical deficits which impact ability to perform ADLs (bathing, care of mouth/teeth, toileting, grooming, dressing, etc )  - Assess/evaluate cause of self-care deficits   - Assess range of motion  - Assess patient's mobility; develop plan if impaired  - Assess patient's need for assistive devices and provide as appropriate  - Encourage maximum independence but intervene and supervise when necessary  - Involve family in performance of ADLs  - Assess for home care needs following discharge   - Consider OT consult to assist with ADL evaluation and planning for discharge  - Provide patient education as appropriate  Outcome: Progressing  Goal: Maintains/Returns to pre admission functional level  Description: INTERVENTIONS:  - Perform BMAT or MOVE assessment daily    - Set and communicate daily mobility goal to care team and patient/family/caregiver     - Collaborate with rehabilitation services on mobility goals if consulted  - Dangle patient 3 times a day  - Stand patient 6 times a day  - Ambulate patient 4 times a day  - Out of bed to chair 3 times a day   - Out of bed for meals 3 times a day  - Out of bed for toileting  - Record patient progress and toleration of activity level   Outcome: Progressing     Problem: DISCHARGE PLANNING  Goal: Discharge to home or other facility with appropriate resources  Description: INTERVENTIONS:  - Identify barriers to discharge w/patient and caregiver  - Arrange for needed discharge resources and transportation as appropriate  - Identify discharge learning needs (meds, wound care, etc )  - Arrange for interpretive services to assist at discharge as needed  - Refer to Case Management Department for coordinating discharge planning if the patient needs post-hospital services based on physician/advanced practitioner order or complex needs related to functional status, cognitive ability, or social support system  Outcome: Progressing     Problem: Knowledge Deficit  Goal: Patient/family/caregiver demonstrates understanding of disease process, treatment plan, medications, and discharge instructions  Description: Complete learning assessment and assess knowledge base    Interventions:  - Provide teaching at level of understanding  - Provide teaching via preferred learning methods  Outcome: Progressing     Problem: SKIN/TISSUE INTEGRITY - ADULT  Goal: Skin Integrity remains intact(Skin Breakdown Prevention)  Description: Assess:  -Perform Carmelo assessment every shift  -Clean and moisturize skin every day  -Inspect skin when repositioning, toileting, and assisting with ADLS  -Assess under medical devices such as masimo/IV every 2 hours  -Assess extremities for adequate circulation and sensation     Bed Management:  -Have minimal linens on bed & keep smooth, unwrinkled  -Change linens as needed when moist or perspiring  -Avoid sitting or lying in one position for more than 2 hours while in bed  -Keep HOB at 30 degrees     Toileting:  -Offer bedside commode  -Assess for incontinence every hour  -Use incontinent care products after each incontinent episode such as purewick    Activity:  -Encourage activity   -Encourage or provide ROM exercises   -Turn and reposition patient every 2 Hours  -Use appropriate equipment to lift or move patient in bed  -Instruct/ Assist with weight shifting every 2 hours when out of bed in chair  -Consider limitation of chair time 2 hour intervals    Skin Care:  -Avoid use of baby powder, tape, friction and shearing, hot water or constrictive clothing  -Do not massage red bony areas    Next Steps:  -Teach patient strategies to minimize risks such as keeping skin dry   -Consider consults to  interdisciplinary teams such as wound care  Outcome: Progressing  Goal: Incision(s), wounds(s) or drain site(s) healing without S/S of infection  Description: INTERVENTIONS  - Assess and document dressing, incision, wound bed, drain sites and surrounding tissue  - Provide patient and family education  - Perform skin care/dressing changes every day   Outcome: Progressing  Goal: Pressure injury heals and does not worsen  Description: Interventions:  - Implement low air loss mattress or specialty surface (Criteria met)  - Apply silicone foam dressing  - Instruct/assist with weight shifting every 120 minutes when in chair   - Limit chair time to 2 hour intervals  - Use special pressure reducing interventions such as waffle cushion when in chair   - Apply fecal or urinary incontinence containment device   - Turn and reposition patient & offload bony prominences every 2 hours   - Utilize friction reducing device or surface for transfers   - Consider consults to  interdisciplinary teams such as wound care  - Use incontinent care products after each incontinent episode such as purewick  - Consider nutrition services referral as needed  Outcome: Progressing

## 2022-12-11 NOTE — ED ATTENDING ATTESTATION
12/11/2022  IAlexa MD, saw and evaluated the patient  I have discussed the patient with the resident/non-physician practitioner and agree with the resident's/non-physician practitioner's findings, Plan of Care, and MDM as documented in the resident's/non-physician practitioner's note, except where noted  All available labs and Radiology studies were reviewed  I was present for key portions of any procedure(s) performed by the resident/non-physician practitioner and I was immediately available to provide assistance  At this point I agree with the current assessment done in the Emergency Department  I have conducted an independent evaluation of this patient a history and physical is as follows:    ED Course         Critical Care Time  Procedures    Patient 60 yof who presents with weakness  Seen at [facility] for hypoglycemia  Feels well currently  No respiratory symptoms  Some LLQ abdominal tenderness  No vomiting or diarrhea      + history of cancer  Ddx - pe, acs, sepsis, bleeding vs other  MDM pleasant 60 yof, broad differential, will evaluate         CriticalCare Time  Performed by: Alexa Escalante MD  Authorized by: Alexa Escalante MD     Critical care provider statement:     Critical care time (minutes):  45    Critical care time was exclusive of:  Separately billable procedures and treating other patients and teaching time    Critical care was necessary to treat or prevent imminent or life-threatening deterioration of the following conditions:  Hypotension, anemia    Critical care was time spent personally by me on the following activities:  Blood draw for specimens, obtaining history from patient or surrogate, development of treatment plan with patient or surrogate, evaluation of patient's response to treatment, examination of patient, re-evaluation of patient's condition, ordering and review of laboratory studies and ordering and performing treatments and interventions

## 2022-12-11 NOTE — ASSESSMENT & PLAN NOTE
· Tender area of erythema and induration to subq area of R abd/pannus  Fortunately patient is afebrile w/o leukocytosis, though has been feeling weak and BG has been higher recently which she was seen at [facility] ED for  Would treat as infection, hx of panniculitis  · Given vanc and cefepime in the ED  Continue Vancomycin  · There also may be yeast component in the pannus folds, would start nystatin powder twice daily  · Monitor fever curve and WBC  If procal remains negative and patient stays systemically well, consider monitor off further abx

## 2022-12-11 NOTE — QUICK NOTE
Progress Note - Triage Assessment   Indigo Baca 60 y o  female MRN: [id number]    Time Called: 0603  Date Called: 12/11/22  Room#: ED 31  Time Evaluated: 4277  Person requesting evaluation: Dr Daniel Rnagel to ED to evaluate patient for possible admission to Critical Care Service, chart reviewed and patient evaluated  Pt here with weakness/lethargy and hyperglycemia  Hypotensive on arrival to ED 1L IVF given  Labs VBG pH 7 324, Co2 65, Cr 2 19, glucose 188, Hgb 6 3, LA 1 4  Pt received 1 unit PRBC's and BP improved  CT ABD/Pelvis - Inflammatory changes overlying the right ventral abdominal wall hernia suggestive of infection such as cellulitis  Cultures were drawn, ABX: Cefepime/Vanco given  Pt remains sleepy but wakes easily to verbal stimuli, is oriented to person, place, time and event but will fall asleep quickly when conversation ends  BP cycled multiple times during evaluation, MAP always 70's, 300cc urine made thus far  Currently on 3L NC SpO2 98%  At this time she has no critical care needs and is Okay for admit to [facility] service as SD2  Recommendations discussed with ED attending Dr Yakelin Newell         Triage Assessment:     Patient appropriate to be admitted to med-surg level of care  If any questions or concerns please call the critical care team or tiger text the critical care team for further questions

## 2022-12-11 NOTE — H&P
Juan Carlos Jenkins 1 1960, 60 y o  female MRN: [id number]  Unit/Bed#: W MS 57565 Encounter: [id number]  Primary Care Provider: Torsten Walker MD   Date and time admitted to hospital: 12/11/2022  1:07 AM    * Abdominal wall cellulitis  Assessment & Plan  · Tender area of erythema and induration to subq area of R abd/pannus  Fortunately patient is afebrile w/o leukocytosis, though has been feeling weak and BG has been higher recently which she was seen at [facility] ED for  Would treat as infection, hx of panniculitis  · Given vanc and cefepime in the ED  Continue Vancomycin  · There also may be yeast component in the pannus folds, would start nystatin powder twice daily  · Monitor fever curve and WBC  If procal remains negative and patient stays systemically well, consider monitor off further abx  CKD (chronic kidney disease) stage 4, GFR 15-29 ml/min [facility])  Assessment & Plan  Lab Results   Component Value Date    EGFR 23 12/11/2022    EGFR 22 12/10/2022    EGFR 29 12/02/2022    CREATININE 2 19 (H) 12/11/2022    CREATININE 2 22 (H) 12/10/2022    CREATININE 1 79 (H) 12/02/2022   sCR at baseline  Continue to monitor    Acute blood loss anemia  Assessment & Plan  · Has been evaluated multiple times in past with EGD and c-scope recently  Acute on chronic  No active evidence of bleeding at this time  Concern that this may be multifactorial - advanced CKD, hematologic? · Patient to be given 1u pRBC  Transfuse for Hgb <7 symptomatic anemia  H&H q8  · Hold AC - start heparin gtt  INR is subtherapeutic on coumadin  · Monitor for signs of bleeding  Check occult blood  · Check LDH, haptoglobin  Haptoglobin in the past has been normal   Check hemolysis smear    · Check B12 and folate with macrocytosis  · IV PPI BID  · Non-ulcerogenic diet  · GI consult    Acute on chronic respiratory failure with hypoxia and hypercapnia (HCC)  Assessment & Plan  · Chronically on 3L at baseline  Currently at baseline  Diabetes mellitus type 2 in obese [facility])  Assessment & Plan  Lab Results   Component Value Date    HGBA1C 7 0 (H) 11/01/2022       Recent Labs     12/10/22  0625   POCGLU 320*     · BG has been more elevated likely from infection  · Start ISS TID with meals and qHS  · Hypoglycemia protocol  · Diabetic diet  · Adjust as needed to maintain BG goal 140-180    Blood Sugar Average: Last 72 hrs: Morbid obesity with BMI of 50 0-59 9, adult [facility])  Assessment & Plan  · Nutrition consult    COPD with asthma ([address] )  Assessment & Plan  · wo exacerbation    Paroxysmal atrial fibrillation (HCC)  Assessment & Plan  · INR subtherapeutic  Hold coumadin in setting of possible bleed  · Heparin gtt  · Continue BB      VTE Pharmacologic Prophylaxis: VTE Score: 3 Moderate Risk (Score 3-4) - Pharmacological DVT Prophylaxis Ordered: heparin drip  Code Status: Level 1 - Full Code   Discussion with family: Updated  (girlfriend) at bedside  Anticipated Length of Stay: Patient will be admitted on an inpatient basis with an anticipated length of stay of greater than 2 midnights secondary to IV abx  Total Time for Visit, including Counseling / Coordination of Care: 60 minutes Greater than 50% of this total time spent on direct patient counseling and coordination of care  Chief Complaint:     History of Present Illness:  Jerzy Harrison is a 60 y o  female with a PMH of advanced CKD, anemia, DM2, morbid obesity, PAF on AC with coumadin, COPD/asthma overlapp syndrome, chronic hypoxic resp failure who presents with elevated blood glucose and weakness  She was seen at [facility] ED yesterday for elevated blood glucose  She has also noticed redness and pain in the RLQ of her abdomen, feeling more weak over the past few days  She has hx of anemia, unclear etiology  She has admissions where she had EGD and c-scope and no bleeding identified    She is on [facility] with coumadin for PAF, currently subtherapeutic  No active evidence of bleeding though Hgb <7  Denies melena or hematochezia  Had BM yesterday  She is planned for capsule endoscopy as outpt  Review of Systems:  Review of Systems   Constitutional: Positive for activity change and fatigue  Negative for chills and fever  Respiratory: Negative for cough, shortness of breath and wheezing  Cardiovascular: Negative for chest pain, palpitations and leg swelling  Gastrointestinal: Positive for abdominal pain  Negative for constipation, diarrhea, nausea and vomiting  Past Medical and Surgical History:   Past Medical History:   Diagnosis Date   • Anemia    • Arthritis    • Cancer of kidney ([facility] )    • Chronic kidney disease    • Diabetes mellitus ([facility] )    • Disease of thyroid gland    • HLD (hyperlipidemia)    • Hypertension    • Ovarian cancer ([facility] )    • Pneumonia        Past Surgical History:   Procedure Laterality Date   • CHOLECYSTECTOMY     • CT GUIDED PERC DRAINAGE CATHETER PLACEMENT  5/16/2016   • GALLBLADDER SURGERY  05/01/2004   • HYSTERECTOMY  06/01/2018   • NEPHRECTOMY Right 08/02/2018       Meds/Allergies:  Prior to Admission medications    Medication Sig Start Date End Date Taking?  Authorizing Provider   albuterol (2 5 mg/3 mL) 0 083 % nebulizer solution INHALE CONTENTS OF 1 VIAL ( 3 MILLILITERS ) IN NEBULIZER BY MOUTH AND INTO THE LUNGS EVERY 6 HOURS IF NEEDED FOR WHEEZING OR SHORTNESS OF BREATH 2/8/21  Yes Melony Quintanilla MD   albuterol (PROVENTIL HFA,VENTOLIN HFA) 90 mcg/act inhaler Inhale 2 puffs every 4 (four) hours as needed for wheezing 10/26/22 1/24/23 Yes Wilson Hurley MD   amLODIPine (NORVASC) 5 mg tablet Take 1 tablet (5 mg total) by mouth daily 10/26/22 1/24/23 Yes Wilson Hurley MD   atorvastatin (LIPITOR) 10 mg tablet Take 1 tablet (10 mg total) by mouth daily 10/26/22  Yes Wilson Hurley MD   Blood Glucose Monitoring Suppl (hipix Pro Glucose Meter) SAVANNAH Use 2 (two) times a day Joo glucometer, test twice daily 11/30/21  Yes Kendal Vargas MD   collagenase Roberto Chase) ointment Apply topically daily 10/26/22  Yes Tim Hadley MD   Embrace Lancets Ultra Thin 30G MISC Use 2 (two) times a day 8/23/21  Yes Brenda Deluca MD   famotidine (PEPCID) 20 mg tablet Take 1 tablet (20 mg total) by mouth daily 10/26/22  Yes Tim Hadley MD   ferrous sulfate 325 (65 Fe) mg tablet Take 1 tablet (325 mg total) by mouth daily with breakfast 12/9/22  Yes Tim Hadley MD   Guaifenesin 1200 MG TB12 Take 1 tablet (1,200 mg total) by mouth 2 (two) times a day 12/8/22 1/7/23 Yes Tim Hadley MD   hydrOXYzine HCL (ATARAX) 50 mg tablet Take 1 tablet (50 mg total) by mouth daily at bedtime 12/8/22  Yes Tim Hadley MD   levothyroxine 100 mcg tablet take 1 tablet by mouth in [address] [place] [address] St 7/29/22  Yes Tim Hadley MD   LORazepam (ATIVAN) 0 5 mg tablet Take 1 tablet (0 5 mg total) by mouth daily at bedtime 7/27/22  Yes Tim Hadley MD   metoprolol tartrate (LOPRESSOR) 50 mg tablet Take 1 tablet (50 mg total) by mouth 2 (two) times a day 10/26/22  Yes Tim Hadley MD   pantoprazole (PROTONIX) 40 mg tablet Take 1 tablet (40 mg total) by mouth daily 12/8/22 3/8/23 Yes Tim Hadley MD   umeclidinium-vilanterol (Anoro Ellipta) 62 5-25 mcg/actuation inhaler Inhale 1 puff daily 12/8/22 1/7/23 Yes Tim Hadley MD   warfarin (COUMADIN) 3 mg tablet Take 1 tablet (3 mg total) by mouth daily 10/26/22  Yes Tim Hadley MD     I have reviewed home medications with patient personally      Allergies: No Known Allergies    Social History:  Marital Status: Single   Patient Pre-hospital Living Situation: Home  Patient Pre-hospital Level of Mobility: power wheelchair    Substance Use History:   Social History     Substance and Sexual Activity   Alcohol Use Never    Comment: n/a     Social History     Tobacco Use   Smoking Status Former   • Packs/day: 3 00   • Years: 30 00   • Pack years: 90 00   • Types: Cigarettes   • Quit date: 10/22/2010   • Years since quittin 1   Smokeless Tobacco Former   • Quit date: 2011   Tobacco Comments    quit approx  9 years ago     Social History     Substance and Sexual Activity   Drug Use Yes   • Types: Marijuana    Comment: smokes with girlfriend when anxious       Family History:  Family History   Problem Relation Age of Onset   • No Known Problems Mother    • No Known Problems Father        Physical Exam:     Vitals:   Blood Pressure: 106/67 (22)  Pulse: (!) 109 (22)  Temperature: (!) 97 4 °F (36 3 °C) (22)  Temp Source: Oral (22)  Respirations: 17 (22)  Height: 5' 4" (162 6 cm) (22)  Weight - Scale: (!) 147 kg (324 lb) (22)  SpO2: 98 % (22)    Physical Exam  Vitals reviewed  Constitutional:       General: She is not in acute distress  Appearance: She is ill-appearing  Cardiovascular:      Rate and Rhythm: Regular rhythm  Tachycardia present  Pulmonary:      Effort: No respiratory distress  Breath sounds: No stridor  No wheezing or rales  Abdominal:      General: There is no distension  Palpations: Abdomen is soft  There is no mass  Tenderness: There is abdominal tenderness (LLQ)  Comments: Obese abdomen with large pannus   Musculoskeletal:         General: No swelling  Skin:     General: Skin is warm and dry  Coloration: Skin is pale  Findings: Erythema (Area of erythema and induration in the LLQ) present  Neurological:      Mental Status: She is alert  Mental status is at baseline            Additional Data:     Lab Results:  Results from last 7 days   Lab Units 22  0130   WBC Thousand/uL 4 35   HEMOGLOBIN g/dL 6 3*   HEMATOCRIT % 22 6*   PLATELETS Thousands/uL 194   NEUTROS PCT % 67   LYMPHS PCT % 17   MONOS PCT % 11 EOS PCT % 4     Results from last 7 days   Lab Units 12/11/22  0130   SODIUM mmol/L 138   POTASSIUM mmol/L 4 6   CHLORIDE mmol/L 108   CO2 mmol/L 32   BUN mg/dL 22   CREATININE mg/dL 2 19*   ANION GAP mmol/L -2*   CALCIUM mg/dL 8 3*   ALBUMIN g/dL 2 5*   TOTAL BILIRUBIN mg/dL 0 32   ALK PHOS U/L 112*   ALT U/L 11   AST U/L 13   GLUCOSE RANDOM mg/dL 188*     Results from last 7 days   Lab Units 12/11/22  0130   INR  1 77*     Results from last 7 days   Lab Units 12/10/22  0625   POC GLUCOSE mg/dl 320*         Results from last 7 days   Lab Units 12/11/22  0130   LACTIC ACID mmol/L 1 4   PROCALCITONIN ng/ml 0 23       Lines/Drains:  Invasive Devices     Peripheral Intravenous Line  Duration           Peripheral IV 12/11/22 Dorsal (posterior); Left Hand <1 day    Peripheral IV 12/11/22 Left Antecubital <1 day    Peripheral IV 12/11/22 Right Forearm <1 day                    Imaging: Reviewed radiology reports from this admission including: abdominal/pelvic CT  CT chest abdomen pelvis wo contrast   Final Result by Hallie Vazquez DO (12/11 4120)      Inflammatory changes overlying the right ventral abdominal wall hernia suggestive of infection such as cellulitis                  Workstation performed: [id number]         XR chest 1 view portable   Final Result by Angela Blakely MD (12/11 1000)      Mild bibasilar atelectasis  Workstation performed: [id number]               ** Please Note: This note has been constructed using a voice recognition system   **

## 2022-12-12 ENCOUNTER — HOME CARE VISIT (OUTPATIENT)
Dept: HOME HEALTH SERVICES | Facility: HOME HEALTHCARE | Age: 62
End: 2022-12-12

## 2022-12-12 LAB
APTT PPP: 52 SECONDS (ref 23–37)
APTT PPP: 80 SECONDS (ref 23–37)
APTT PPP: 96 SECONDS (ref 23–37)
BASOPHILS # BLD AUTO: 0.01 THOUSANDS/ÂΜL (ref 0–0.1)
BASOPHILS NFR BLD AUTO: 0 % (ref 0–1)
EOSINOPHIL # BLD AUTO: 0.17 THOUSAND/ÂΜL (ref 0–0.61)
EOSINOPHIL NFR BLD AUTO: 5 % (ref 0–6)
ERYTHROCYTE [DISTWIDTH] IN BLOOD BY AUTOMATED COUNT: 19.2 % (ref 11.6–15.1)
FOLATE SERPL-MCNC: 4.5 NG/ML (ref 3.1–17.5)
GLUCOSE SERPL-MCNC: 107 MG/DL (ref 65–140)
GLUCOSE SERPL-MCNC: 109 MG/DL (ref 65–140)
GLUCOSE SERPL-MCNC: 132 MG/DL (ref 65–140)
GLUCOSE SERPL-MCNC: 149 MG/DL (ref 65–140)
GLUCOSE SERPL-MCNC: 156 MG/DL (ref 65–140)
HAPTOGLOB SERPL-MCNC: 250 MG/DL (ref 37–355)
HCT VFR BLD AUTO: 24.3 % (ref 34.8–46.1)
HCT VFR BLD AUTO: 27.8 % (ref 34.8–46.1)
HGB BLD-MCNC: 7 G/DL (ref 11.5–15.4)
HGB BLD-MCNC: 7.6 G/DL (ref 11.5–15.4)
IMM GRANULOCYTES # BLD AUTO: 0.02 THOUSAND/UL (ref 0–0.2)
IMM GRANULOCYTES NFR BLD AUTO: 1 % (ref 0–2)
INR PPP: 1.9 (ref 0.84–1.19)
LYMPHOCYTES # BLD AUTO: 0.68 THOUSANDS/ÂΜL (ref 0.6–4.47)
LYMPHOCYTES NFR BLD AUTO: 19 % (ref 14–44)
MCH RBC QN AUTO: 27.8 PG (ref 26.8–34.3)
MCHC RBC AUTO-ENTMCNC: 27.6 G/DL (ref 31.4–37.4)
MCV RBC AUTO: 101 FL (ref 82–98)
MONOCYTES # BLD AUTO: 0.35 THOUSAND/ÂΜL (ref 0.17–1.22)
MONOCYTES NFR BLD AUTO: 10 % (ref 4–12)
NEUTROPHILS # BLD AUTO: 2.36 THOUSANDS/ÂΜL (ref 1.85–7.62)
NEUTS SEG NFR BLD AUTO: 65 % (ref 43–75)
NRBC BLD AUTO-RTO: 1 /100 WBCS
PLATELET # BLD AUTO: 171 THOUSANDS/UL (ref 149–390)
PMV BLD AUTO: 10.3 FL (ref 8.9–12.7)
PROCALCITONIN SERPL-MCNC: 0.27 NG/ML
PROTHROMBIN TIME: 22.1 SECONDS (ref 11.6–14.5)
RBC # BLD AUTO: 2.41 MILLION/UL (ref 3.81–5.12)
VANCOMYCIN SERPL-MCNC: 22.4 UG/ML (ref 10–20)
VIT B12 SERPL-MCNC: 897 PG/ML (ref 100–900)
WBC # BLD AUTO: 3.59 THOUSAND/UL (ref 4.31–10.16)

## 2022-12-12 RX ORDER — METOPROLOL TARTRATE 5 MG/5ML
5 INJECTION INTRAVENOUS ONCE
Status: DISCONTINUED | OUTPATIENT
Start: 2022-12-12 | End: 2022-12-12

## 2022-12-12 RX ORDER — WARFARIN SODIUM 3 MG/1
3 TABLET ORAL
Status: DISCONTINUED | OUTPATIENT
Start: 2022-12-12 | End: 2022-12-14 | Stop reason: HOSPADM

## 2022-12-12 RX ORDER — MICONAZOLE NITRATE 20 MG/G
CREAM TOPICAL 2 TIMES DAILY
Status: DISCONTINUED | OUTPATIENT
Start: 2022-12-12 | End: 2022-12-14 | Stop reason: HOSPADM

## 2022-12-12 RX ADMIN — INSULIN LISPRO 1 UNITS: 100 INJECTION, SOLUTION INTRAVENOUS; SUBCUTANEOUS at 12:39

## 2022-12-12 RX ADMIN — MICONAZOLE NITRATE: 20 CREAM TOPICAL at 18:15

## 2022-12-12 RX ADMIN — WARFARIN SODIUM 3 MG: 3 TABLET ORAL at 17:41

## 2022-12-12 RX ADMIN — FAMOTIDINE 20 MG: 20 TABLET ORAL at 09:08

## 2022-12-12 RX ADMIN — HYDROXYZINE HYDROCHLORIDE 50 MG: 25 TABLET ORAL at 21:14

## 2022-12-12 RX ADMIN — PANTOPRAZOLE SODIUM 40 MG: 40 INJECTION, POWDER, FOR SOLUTION INTRAVENOUS at 21:22

## 2022-12-12 RX ADMIN — HEPARIN SODIUM 10.1 UNITS/KG/HR: 10000 INJECTION, SOLUTION INTRAVENOUS at 16:46

## 2022-12-12 RX ADMIN — LEVOTHYROXINE SODIUM 100 MCG: 100 TABLET ORAL at 05:15

## 2022-12-12 RX ADMIN — Medication 12.5 MG: at 09:08

## 2022-12-12 RX ADMIN — PANTOPRAZOLE SODIUM 40 MG: 40 INJECTION, POWDER, FOR SOLUTION INTRAVENOUS at 09:08

## 2022-12-12 RX ADMIN — FERROUS SULFATE TAB 325 MG (65 MG ELEMENTAL FE) 325 MG: 325 (65 FE) TAB at 09:08

## 2022-12-12 RX ADMIN — ATORVASTATIN CALCIUM 10 MG: 10 TABLET, FILM COATED ORAL at 09:08

## 2022-12-12 NOTE — ASSESSMENT & PLAN NOTE
· With RVR this AM, patient has not received her BB due to hypotension and hold parameters  · INR subtherapeutic  Resume Coumadin today  · Continue Heparin gtt for bridging  · Continue lopressor 12 5 mg BID   Give additional 5 mg push this AM   · Monitor HR

## 2022-12-12 NOTE — ASSESSMENT & PLAN NOTE
Lab Results   Component Value Date    HGBA1C 7 0 (H) 11/01/2022       Recent Labs     12/11/22  1548 12/11/22  2120 12/12/22  0049 12/12/22  0737   POCGLU 125 148* 109 107     · BG has been more elevated likely from infection      · Continue ISS TID with meals and qHS  · Hypoglycemia protocol  · Diabetic diet  · Adjust as needed to maintain BG goal 140-180    Blood Sugar Average: Last 72 hrs:  (P) 121 4

## 2022-12-12 NOTE — PLAN OF CARE
Problem: Potential for Falls  Goal: Patient will remain free of falls  Description: INTERVENTIONS:  - Educate patient/family on patient safety including physical limitations  - Instruct patient to call for assistance with activity   - Consult OT/PT to assist with strengthening/mobility   - Keep Call bell within reach  - Keep bed low and locked with side rails adjusted as appropriate  - Keep care items and personal belongings within reach  - Initiate and maintain comfort rounds  - Make Fall Risk Sign visible to staff  - Apply yellow socks and bracelet for high fall risk patients  - Consider moving patient to room near nurses station  Outcome: Progressing     Problem: PAIN - ADULT  Goal: Verbalizes/displays adequate comfort level or baseline comfort level  Description: Interventions:  - Encourage patient to monitor pain and request assistance  - Assess pain using appropriate pain scale  - Administer analgesics based on type and severity of pain and evaluate response  - Implement non-pharmacological measures as appropriate and evaluate response  - Consider cultural and social influences on pain and pain management  - Notify physician/advanced practitioner if interventions unsuccessful or patient reports new pain  Outcome: Progressing     Problem: INFECTION - ADULT  Goal: Absence or prevention of progression during hospitalization  Description: INTERVENTIONS:  - Assess and monitor for signs and symptoms of infection  - Monitor lab/diagnostic results  - Monitor all insertion sites, i e  indwelling lines, tubes, and drains  - Monitor endotracheal if appropriate and nasal secretions for changes in amount and color  - Thorp appropriate cooling/warming therapies per order  - Administer medications as ordered  - Instruct and encourage patient and family to use good hand hygiene technique  - Identify and instruct in appropriate isolation precautions for identified infection/condition  Outcome: Progressing     Problem: SAFETY ADULT  Goal: Patient will remain free of falls  Description: INTERVENTIONS:  - Educate patient/family on patient safety including physical limitations  - Instruct patient to call for assistance with activity   - Consult OT/PT to assist with strengthening/mobility   - Keep Call bell within reach  - Keep bed low and locked with side rails adjusted as appropriate  - Keep care items and personal belongings within reach  - Initiate and maintain comfort rounds  - Make Fall Risk Sign visible to staff  - Apply yellow socks and bracelet for high fall risk patients  - Consider moving patient to room near nurses station  Outcome: Progressing

## 2022-12-12 NOTE — ASSESSMENT & PLAN NOTE
Lab Results   Component Value Date    EGFR 23 12/11/2022    EGFR 22 12/10/2022    EGFR 29 12/02/2022    CREATININE 2 19 (H) 12/11/2022    CREATININE 2 22 (H) 12/10/2022    CREATININE 1 79 (H) 12/02/2022   · sCR at baseline    Continue to monitor

## 2022-12-12 NOTE — CONSULTS
Consult Note - Wound   Ayala Romo 60 y o  female MRN: [id number]  Unit/Bed#: W -01 Encounter: [id number]      Assessment:   Patient known to me from prior admission  Virtual wound care nurse consult performed  Reviewed images and discussed with [facility] RN  Patient with history of DM  Moisture associated skin damage from intertriginous dermatitis under abdominal pannus, under breast folds  Cutaneous fungal infection on inner thighs  Scattered red rash  ITD on back fold  Irritant contact dermatitis due to fecal and urinary incontinence on buttocks/sacrum/right posterior thigh  Diffusely scattered partial thickness skin erosion  Wound/Skin Care Plan:   1  Levar cream to inner thighs and to buttocks/sacrum and to right posterior thigh  Apply twice a day and as needed after incontinence care  2  Apply Interdry fabric wicking sheet that is impregnated with silver underneath abdominal pannus and breasts  3  Accumax pump to mattress if mattress is compatible and unit available  4  Pressure redistribution cushion to chair  5  Moisturize skin daily  6  Offload heels from mattress surface  7  Continue to frequently turn and reposition patient  Use wedges  8  Wound care nurse follow up  Discussed with LEONIE Colon                 Buttock skin erosion        Abdominal pannus        Groin fold/abdominal pannus        Abdominal pannus ITD        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

## 2022-12-12 NOTE — ASSESSMENT & PLAN NOTE
· Has been evaluated multiple times in past with EGD and c-scope recently  Acute on chronic  No active evidence of bleeding at this time  Concern that this may be multifactorial - advanced CKD, hematologic?  · S/p 1u pRBC  Transfuse for Hgb <7 symptomatic anemia  · Monitor for signs of bleeding  Check occult blood  · Check LDH, haptoglobin  Haptoglobin in the past has been normal   Check hemolysis smear  · Check B12 and folate with macrocytosis  · IV PPI BID  · Non-ulcerogenic diet  · GI consult - no inpatient recs  Follow up for outpatient capsule endoscopy

## 2022-12-12 NOTE — ASSESSMENT & PLAN NOTE
· Tender area of erythema and induration to subq area of R abd/pannus  Fortunately patient is afebrile w/o leukocytosis, though has been feeling weak and BG has been higher recently which she was seen at [facility] ED for  Would treat as infection, hx of panniculitis  · Given vanc and cefepime in the ED  Continue Vancomycin  · There also may be yeast component in the pannus folds, would start nystatin powder twice daily  · Monitor fever curve and WBC    · Check MRSA, if negative can dc vanco and change to Ancef

## 2022-12-12 NOTE — PROGRESS NOTES
Nevaeh Silveira is a 60 y o  female who is currently ordered Vancomycin IV with management by the Pharmacy Consult service  Relevant clinical data and objective / subjective history reviewed  Vancomycin Assessment:  Indication and Goal AUC/Trough: Soft tissue (goal -600, trough >10)  Clinical Status: stable  Micro:   12/10 FLU/RSV/COVID: negative         blood cultures: in process  Renal Function:  SCr:  2 19 mg/dL  CrCl:  37 6 mL/min  Renal replacement: Not on dialysis  Days of Therapy: 1  Current Dose: 1250mg IV prn random level less than or equal to 15  Vancomycin Plan:  New Dosinmg IV once  Next Level:  @0600  Renal Function Monitoring: Daily BMP and [place] will continue to follow closely for s/sx of nephrotoxicity, infusion reactions and appropriateness of therapy  BMP and CBC will be ordered per protocol  We will continue to follow the patient’s culture results and clinical progress daily      Thalia Alvarez, Pharmacist

## 2022-12-12 NOTE — PROGRESS NOTES
Nevaeh Silveira is a 60 y o  female who is currently ordered Vancomycin IV with management by the Pharmacy Consult service  Relevant clinical data and objective / subjective history reviewed  Vancomycin Assessment:  Indication and Goal AUC/Trough: Soft tissue (goal -600, trough >10)  Clinical Status: stable  Micro:   Mrsa culture pending 12/12  Renal Function:  SCr: 2 19 mg/dL  CrCl: 38 mL/min  Renal replacement: n/a  Days of Therapy: 2  Current Dose: 1250 mg IV prn random level </= 15    Vancomycin Plan:  New Dosing: level 22 4 today so will not receive dose  Next Level: 12/13 at 0600  Renal Function Monitoring: Daily BMP and [place] will continue to follow closely for s/sx of nephrotoxicity, infusion reactions and appropriateness of therapy  BMP and CBC will be ordered per protocol  We will continue to follow the patient’s culture results and clinical progress daily      Vera Sabillon, Pharmacist

## 2022-12-12 NOTE — PROGRESS NOTES
[facility]  Progress Note - Leonidas Humphries 1960, 60 y o  female MRN: [id number]  Unit/Bed#: W -53 Encounter: [id number]  Primary Care Provider: Bridger Peña MD   Date and time admitted to hospital: 12/11/2022  1:07 AM    * Abdominal wall cellulitis  Assessment & Plan  · Tender area of erythema and induration to subq area of R abd/pannus  Fortunately patient is afebrile w/o leukocytosis, though has been feeling weak and BG has been higher recently which she was seen at [place] [facility] for  Would treat as infection, hx of panniculitis  · Given vanc and cefepime in the ED  Continue Vancomycin  · There also may be yeast component in the pannus folds, would start nystatin powder twice daily  · Monitor fever curve and WBC  · Check MRSA, if negative can dc vanco and change to Ancef    Paroxysmal atrial fibrillation (HCC)  Assessment & Plan  · With RVR this AM, patient has not received her BB due to hypotension and hold parameters  · INR subtherapeutic  Resume Coumadin today  · Continue Heparin gtt for bridging  · Continue lopressor 12 5 mg BID  Give additional 5 mg push this AM   · Monitor HR    CKD (chronic kidney disease) stage 4, GFR 15-29 ml/min [facility])  Assessment & Plan  Lab Results   Component Value Date    EGFR 23 12/11/2022    EGFR 22 12/10/2022    EGFR 29 12/02/2022    CREATININE 2 19 (H) 12/11/2022    CREATININE 2 22 (H) 12/10/2022    CREATININE 1 79 (H) 12/02/2022   · sCR at baseline  Continue to monitor    Acute blood loss anemia  Assessment & Plan  · Has been evaluated multiple times in past with EGD and c-scope recently  Acute on chronic  No active evidence of bleeding at this time  Concern that this may be multifactorial - advanced CKD, hematologic?  · S/p 1u pRBC  Transfuse for Hgb <7 symptomatic anemia  · Monitor for signs of bleeding  Check occult blood  · Check LDH, haptoglobin  Haptoglobin in the past has been normal   Check hemolysis smear    · Check B12 and folate with macrocytosis  · IV PPI BID  · Non-ulcerogenic diet  · GI consult - no inpatient recs  Follow up for outpatient capsule endoscopy  Acute on chronic respiratory failure with hypoxia and hypercapnia (HCC)  Assessment & Plan  · Chronically on 3L at baseline  Currently at baseline  Diabetes mellitus type 2 in obese [facility])  Assessment & Plan  Lab Results   Component Value Date    HGBA1C 7 0 (H) 11/01/2022       Recent Labs     12/11/22  1548 12/11/22  2120 12/12/22  0049 12/12/22  0737   POCGLU 125 148* 109 107     · BG has been more elevated likely from infection  · Continue ISS TID with meals and qHS  · Hypoglycemia protocol  · Diabetic diet  · Adjust as needed to maintain BG goal 140-180    Blood Sugar Average: Last 72 hrs:  (P) 121 4    Morbid obesity with BMI of 50 0-59 9, adult (HCC)  Assessment & Plan  · Nutrition consult    COPD with asthma ([address] )  Assessment & Plan  · w/o exacerbation  · Monitor respiratory status  VTE Pharmacologic Prophylaxis: VTE Score: 3 Moderate Risk (Score 3-4) - Pharmacological DVT Prophylaxis Ordered: heparin drip  Patient Centered Rounds: I performed bedside rounds with nursing staff today  Discussions with Specialists or Other Care Team Provider: cm, rn    Education and Discussions with Family / Patient: Updated  (significant other) at bedside  Time Spent for Care: 30 minutes  More than 50% of total time spent on counseling and coordination of care as described above  Current Length of Stay: 1 day(s)  Current Patient Status: Inpatient   Certification Statement: The patient will continue to require additional inpatient hospital stay due to A  fib RVR, abdominal wall cellulitis on IV antibiotics  Discharge Plan: Anticipate discharge in 48 hrs to home with home services  Code Status: Level 1 - Full Code    Subjective:   No overnight events per nursing  This morning noted to be tachycardic without any palpitations or chest pain  Reports she is irritated this morning  Still with abdominal wall pain  Objective:     Vitals:   Temp (24hrs), Av 7 °F (36 5 °C), Min:97 7 °F (36 5 °C), Max:97 8 °F (36 6 °C)    Temp:  [97 7 °F (36 5 °C)-97 8 °F (36 6 °C)] 97 7 °F (36 5 °C)  HR:  [101-135] 135  Resp:  [16-18] 18  BP: ()/(50-76) 117/59  SpO2:  [96 %-99 %] 97 %  Body mass index is 55 61 kg/m²  Input and Output Summary (last 24 hours): Intake/Output Summary (Last 24 hours) at 2022 0935  Last data filed at 2022 0026  Gross per 24 hour   Intake 262 33 ml   Output 950 ml   Net -687 67 ml       Physical Exam:   Physical Exam  Vitals and nursing note reviewed  Constitutional:       General: She is not in acute distress  Appearance: Normal appearance  She is obese  HENT:      Head: Normocephalic  Mouth/Throat:      Mouth: Mucous membranes are moist    Eyes:      General: No scleral icterus  Pupils: Pupils are equal, round, and reactive to light  Cardiovascular:      Rate and Rhythm: Tachycardia present  Rhythm irregular  Heart sounds: No murmur heard  Pulmonary:      Effort: Pulmonary effort is normal  No respiratory distress  Breath sounds: Normal breath sounds  No wheezing, rhonchi or rales  Abdominal:      General: Bowel sounds are normal  There is no distension  Palpations: Abdomen is soft  Tenderness: There is no abdominal tenderness  Musculoskeletal:         General: No swelling  Right lower leg: Edema present  Left lower leg: Edema present  Skin:     Capillary Refill: Capillary refill takes less than 2 seconds  Findings: Erythema (abdominal wall) present  Neurological:      General: No focal deficit present  Mental Status: She is alert and oriented to person, place, and time  Mental status is at baseline            Additional Data:     Labs:  Results from last 7 days   Lab Units 22  0443   WBC Thousand/uL 3 59*   HEMOGLOBIN g/dL 7 0*   HEMATOCRIT % 24 3*   PLATELETS Thousands/uL 171   NEUTROS PCT % 65   LYMPHS PCT % 19   MONOS PCT % 10   EOS PCT % 5     Results from last 7 days   Lab Units 12/11/22  0130   SODIUM mmol/L 138   POTASSIUM mmol/L 4 6   CHLORIDE mmol/L 108   CO2 mmol/L 32   BUN mg/dL 22   CREATININE mg/dL 2 19*   ANION GAP mmol/L -2*   CALCIUM mg/dL 8 3*   ALBUMIN g/dL 2 5*   TOTAL BILIRUBIN mg/dL 0 32   ALK PHOS U/L 112*   ALT U/L 11   AST U/L 13   GLUCOSE RANDOM mg/dL 188*     Results from last 7 days   Lab Units 12/12/22  0443   INR  1 90*     Results from last 7 days   Lab Units 12/12/22  0737 12/12/22  0049 12/11/22  2120 12/11/22  1548 12/11/22  1103 12/10/22  0625   POC GLUCOSE mg/dl 107 109 148* 125 118 320*         Results from last 7 days   Lab Units 12/12/22  0443 12/11/22  1850 12/11/22  0130   LACTIC ACID mmol/L  --  1 1 1 4   PROCALCITONIN ng/ml 0 27*  --  0 23       Lines/Drains:  Invasive Devices     Peripheral Intravenous Line  Duration           Peripheral IV 12/11/22 Dorsal (posterior); Left Hand 1 day    Peripheral IV 12/11/22 Left Antecubital 1 day    Peripheral IV 12/11/22 Right Forearm 1 day                      Imaging: No pertinent imaging reviewed  Recent Cultures (last 7 days):   Results from last 7 days   Lab Units 12/11/22  0130   BLOOD CULTURE  No Growth at 24 hrs  No Growth at 24 hrs         Last 24 Hours Medication List:   Current Facility-Administered Medications   Medication Dose Route Frequency Provider Last Rate   • acetaminophen  650 mg Oral Q6H PRN Christy Oakes MD     • albuterol  2 5 mg Nebulization Q4H PRN Trish Tyler PA-C     • atorvastatin  10 mg Oral Daily Earline Tyler PA-C     • famotidine  20 mg Oral Daily Earline Tyler PA-C     • ferrous sulfate  325 mg Oral Daily With Breakfast Earline Tyler PA-C     • guaiFENesin  1,200 mg Oral BID Earline Tyler PA-C     • heparin (porcine)  3-20 Units/kg/hr (Order-Specific) Intravenous Titrated Carmen Lass MD Monse 8 1 Units/kg/hr (12/11/22 2110)   • hydrOXYzine HCL  50 mg Oral HS Fernando Tyler PA-C     • insulin lispro  1-5 Units Subcutaneous TID AC Yudith Aviles MD     • insulin lispro  1-5 Units Subcutaneous HS Yudith Aviles MD     • levothyroxine  100 mcg Oral Early Morning Earline Tyler PA-C     • LORazepam  0 5 mg Oral HS Earline Tyler PA-C     • metoprolol  5 mg Intravenous Once Nargis Wallace PA-C     • metoprolol tartrate  12 5 mg Oral BID Nargis Wallace PA-C     • nystatin   Topical BID Yudith Aviles MD     • ondansetron  4 mg Intravenous Q6H PRN Yudith Aviles MD     • pantoprazole  40 mg Intravenous Q12H 139 [facility] [address], MD     • umeclidinium-vilanterol  1 puff Inhalation Daily Earline Tyler PA-C     • vancomycin  15 mg/kg (Adjusted) Intravenous Daily PRN Yudith Aviles MD     • warfarin  3 mg Oral Daily (warfarin) Nargis Wallace PA-C          Today, Patient Was Seen By: Mary Nash PA-C    **Please Note: This note may have been constructed using a voice recognition system  **

## 2022-12-12 NOTE — PROGRESS NOTES
Progress Note- Wilbert Chol 60 y o  female MRN: [id number]    Unit/Bed#: ALICIA -01 Encounter: [id number]      Assessment and Plan:    #1   Macrocytic anemia: Suspect this is multifactorial   Patient currently is having no signs of active GI bleeding  Denies any melena or blood in the stool  Reports that her stools are brown  She has had extensive GI work-up  Most recently she had a colonoscopy on 12/2 which was completely unremarkable with no signs of bleeding  She also had an EGD with push enteroscopy on 11/28 without any sources of bleeding  Colonoscopy on that same day was notable for old blood throughout the entire colon  EGD with push enteroscopy on 11/25 was notable for some AVMs in the duodenum and stomach  The concern is that patient may have oozing small bowel AVMs in the setting of anticoagulation use  Hgb remains stable at 7 & pt having brown/green stool per nursing team     -At this time with no signs of active GI bleeding, no need for repeat EGD/Colonoscopy  Patient ultimately needs a capsule endoscopy  This should be done as an outpatient and we can try to move up patient's appointment due to do this more urgently  We would only do this inpatient if patient was having active signs of bleeding without a source on upper endoscopy or colonoscopy  -Continue diet as tolerated  -In the meantime would recommend transfusion as necessary and further work-up for other sources of anemia as well     ______________________________________________________________________    Subjective:     Pt had brown/green stool this AM per nurses aide   Noted to be in afib RVR this AM    Medication Administration - last 24 hours from 12/11/2022 1031 to 12/12/2022 1031       Date/Time Order Dose Route Action Action by     12/11/2022 1047 EST pantoprazole (PROTONIX) EC tablet 40 mg 40 mg Oral Not Given Anabelle Vijay     12/12/2022 6618 EST umeclidinium-vilanterol 62 5-25 mcg/actuation inhaler 1 puff 1 puff Inhalation Refused Darlene Deemer, RN     12/11/2022 1128 EST umeclidinium-vilanterol 62 5-25 mcg/actuation inhaler 1 puff 1 puff Inhalation Refused Miguel Angel Wise, LPN     26/34/1016 1386 EST hydrOXYzine HCL (ATARAX) tablet 50 mg 50 mg Oral Given Quang Mead, RN     12/12/2022 0908 EST guaiFENesin (MUCINEX) 12 hr tablet 1,200 mg 1,200 mg Oral Refused Darlene Deemer, RN     12/11/2022 1812 EST guaiFENesin (MUCINEX) 12 hr tablet 1,200 mg 1,200 mg Oral Given Miguel Angel Wise, LPN     45/63/0828 4694 EST guaiFENesin (MUCINEX) 12 hr tablet 1,200 mg 1,200 mg Oral Given Miguel Angel Wise, LPN     35/00/0637 7477 EST ferrous sulfate tablet 325 mg 325 mg Oral Given Darlene Deemer, RN     12/11/2022 1116 EST ferrous sulfate tablet 325 mg 325 mg Oral Given Miguel Angel Wise, LPN     40/17/6510 4939 EST famotidine (PEPCID) tablet 20 mg 20 mg Oral Given Darlene Deemer, RN     12/11/2022 1116 EST famotidine (PEPCID) tablet 20 mg 20 mg Oral Given Miguel Angel Wise, CEDRICKN     51/61/4248 4137 EST atorvastatin (LIPITOR) tablet 10 mg 10 mg Oral Given Darlene Deemer, RN     12/11/2022 1116 EST atorvastatin (LIPITOR) tablet 10 mg 10 mg Oral Given Miguel Angel Wise LPN     23/27/9380 4192 EST levothyroxine tablet 100 mcg 100 mcg [facility]     12/11/2022 1116 EST levothyroxine tablet 100 mcg 100 mcg Oral Given Miguel Angel Wise, LPN     18/40/0519 7879 EST LORazepam (ATIVAN) tablet 0 5 mg 0 5 mg Oral Given Quang Mead, RN     12/12/2022 0908 EST metoprolol tartrate (LOPRESSOR) partial tablet 12 5 mg 12 5 mg Oral Given Darlene Deemer, RN     12/11/2022 2139 EST metoprolol tartrate (LOPRESSOR) partial tablet 12 5 mg 12 5 mg Oral Not Given Quang Mead RN     12/11/2022 1116 EST metoprolol tartrate (LOPRESSOR) partial tablet 12 5 mg 0 mg Oral Hold Miguel Angel Wise LPN     28/24/0386 3911 EST pantoprazole (PROTONIX) injection 40 mg 40 mg Intravenous Given Juan Allen RN     12/11/2022 2140 EST pantoprazole (PROTONIX) injection 40 mg 40 mg Intravenous Given Alexa Vigil RN     12/11/2022 1146 EST pantoprazole (PROTONIX) injection 40 mg 40 mg Intravenous Given Watson Bathe     12/12/2022 0917 EST insulin lispro (HumaLOG) 100 units/mL subcutaneous injection 1-5 Units 1 Units Subcutaneous Not Given Darlene Napier RN     12/11/2022 1559 EST insulin lispro (HumaLOG) 100 units/mL subcutaneous injection 1-5 Units 0 Units Subcutaneous Hold Dawn Michael LPN     54/78/6008 4958 EST insulin lispro (HumaLOG) 100 units/mL subcutaneous injection 1-5 Units 1 Units Subcutaneous Not Given Watson Bathe     12/11/2022 2141 EST insulin lispro (HumaLOG) 100 units/mL subcutaneous injection 1-5 Units 1 Units Subcutaneous Not Given Alexa Vigil RN     12/12/2022 2192 EST nystatin (MYCOSTATIN) powder -- Topical Refused Darlene Napier RN     12/11/2022 1724 EST nystatin (MYCOSTATIN) powder -- Topical Refused Watson Bathe     12/11/2022 1140 EST nystatin (MYCOSTATIN) powder -- Topical Refused Dawn Michael LPN     99/61/0392 0743 EST heparin (porcine) injection 4,000 Units 4,000 Units Intravenous Given Watson Bathe     12/11/2022 2110 EST heparin (porcine) 25,000 units in 0 45% NaCl 250 mL infusion (premix) 8 1 Units/kg/hr Intravenous Restarted lAexa Vigil RN     12/11/2022 2005 EST heparin (porcine) 25,000 units in 0 45% NaCl 250 mL infusion (premix) 0 Units/kg/hr Intravenous Hold Alexa Vigil RN     12/11/2022 1146 EST heparin (porcine) 25,000 units in 0 45% NaCl 250 mL infusion (premix) 11 1 Units/kg/hr Intravenous New Bag Anu Childress     12/11/2022 2006 EST vancomycin (VANCOCIN) 1,250 mg in sodium chloride 0 9 % 250 mL IVPB 1,250 mg Intravenous New Bag Alexa Vigil RN          Objective:     Vitals: Blood pressure 117/59, pulse (!) 135, temperature 97 7 °F (36 5 °C), resp  rate 18, height 5' 4" (1 626 m), weight (!) 147 kg (324 lb), SpO2 97 %, not currently breastfeeding  ,Body mass index is 55 61 kg/m²        Intake/Output Summary (Last 24 hours) at 12/12/2022 1031  Last data filed at 12/12/2022 7492  Gross per 24 hour   Intake 262 33 ml   Output 950 ml   Net -687 67 ml       Physical Exam:   General Appearance: Awake and alert, in no acute distress  Chest: clear to auscultation, no rales or rhonchi  Cardiac: S1 & S2 normal, no murmur or gallop  Abdomen: Soft, non-tender, non-distended; bowel sounds normal; no masses or no organomegaly    Invasive Devices     Peripheral Intravenous Line  Duration           Peripheral IV 12/11/22 Dorsal (posterior); Left Hand 1 day    Peripheral IV 12/11/22 Left Antecubital 1 day    Peripheral IV 12/11/22 Right Forearm 1 day                Lab Results:  Admission on 12/11/2022   Component Date Value   • PTT 12/11/2022 41 (H)    • Protime 12/11/2022 20 9 (H)    • INR 12/11/2022 1 77 (H)    • Blood Culture 12/11/2022 No Growth at 24 hrs  • Blood Culture 12/11/2022 No Growth at 24 hrs      • WBC 12/11/2022 4 35    • RBC 12/11/2022 2 20 (L)    • Hemoglobin 12/11/2022 6 3 (LL)    • Hematocrit 12/11/2022 22 6 (L)    • MCV 12/11/2022 103 (H)    • MCH 12/11/2022 28 6    • MCHC 12/11/2022 27 9 (L)    • RDW 12/11/2022 19 2 (H)    • MPV 12/11/2022 10 3    • Platelets 37/36/6001 194    • nRBC 12/11/2022 1    • Neutrophils Relative 12/11/2022 67    • Immat GRANS % 12/11/2022 1    • Lymphocytes Relative 12/11/2022 17    • Monocytes Relative 12/11/2022 11    • Eosinophils Relative 12/11/2022 4    • Basophils Relative 12/11/2022 0    • Neutrophils Absolute 12/11/2022 2 91    • Immature Grans Absolute 12/11/2022 0 03    • Lymphocytes Absolute 12/11/2022 0 72    • Monocytes Absolute 12/11/2022 0 49    • Eosinophils Absolute 12/11/2022 0 19    • Basophils Absolute 12/11/2022 0 01    • Sodium 12/11/2022 138    • Potassium 12/11/2022 4 6    • Chloride 12/11/2022 108    • CO2 12/11/2022 32    • ANION GAP 12/11/2022 -2 (L)    • BUN 12/11/2022 22    • Creatinine 12/11/2022 2 19 (H)    • Glucose 12/11/2022 188 (H)    • Calcium 12/11/2022 8 3 (L)    • Corrected Calcium 12/11/2022 9 5    • AST 12/11/2022 13    • ALT 12/11/2022 11    • Alkaline Phosphatase 12/11/2022 112 (H)    • Total Protein 12/11/2022 5 6 (L)    • Albumin 12/11/2022 2 5 (L)    • Total Bilirubin 12/11/2022 0 32    • eGFR 12/11/2022 23    • LACTIC ACID 12/11/2022 1 4    • Procalcitonin 12/11/2022 0 23    • hs TnI 0hr 12/11/2022 21    • pH, Shayan 12/11/2022 7  324    • pCO2, Shayan 12/11/2022 65 6 (H)    • pO2, Shayan 12/11/2022 69 7 (H)    • HCO3, Shayan 12/11/2022 33 3 (H)    • Base Excess, Shayan 12/11/2022 6 5    • O2 Content, Shayan 12/11/2022 9 3    • O2 HGB, VENOUS 12/11/2022 91 1 (H)    • D-Dimer, Quant 12/11/2022 0 52 (H)    • Magnesium 12/11/2022 2 0    • BNP 12/11/2022 83    • hs TnI 2hr 12/11/2022 21    • Delta 2hr hsTnI 12/11/2022 0    • ABO Grouping 12/11/2022 O    • Rh Factor 12/11/2022 Positive    • Antibody Screen 12/11/2022 Negative    • Specimen Expiration Date 12/11/2022 [id number]    • Unit Product Code 12/11/2022 [id number]    • Unit Number 12/11/2022 [id number]    • Unit ABO 12/11/2022 O    • Unit [facility] 12/11/2022 POS    • Crossmatch 12/11/2022 Compatible    • Unit Dispense Status 12/11/2022 Presumed Trans    • Unit Product Volume 12/11/2022 350    • hs TnI 4hr 12/11/2022 17    • Delta 4hr hsTnI 12/11/2022 -4    • Hemolysis Smear 12/11/2022 No Schistocytes or Helmet Cells noted    • LD 12/11/2022 267    • WBC 12/11/2022 3 44 (L)    • RBC 12/11/2022 2 67 (L)    • Hemoglobin 12/11/2022 7 7 (L)    • Hematocrit 12/11/2022 27 5 (L)    • MCV 12/11/2022 103 (H)    • MCH 12/11/2022 28 8    • MCHC 12/11/2022 28 0 (L)    • RDW 12/11/2022 19 5 (H)    • Platelets 29/11/2900 214    • MPV 12/11/2022 10 8    • PTT 12/11/2022 27    • Protime 12/11/2022 19 1 (H)    • INR 12/11/2022 1 58 (H)    • POC Glucose 12/11/2022 118    • Hemoglobin 12/11/2022 7 0 (L)    • Hematocrit 12/11/2022 25 0 (L)    • Vancomycin Rm 12/11/2022 15 2    • PTT 12/11/2022 121 (H)    • POC Glucose 12/11/2022 125    • LACTIC ACID 12/11/2022 1 1    • Hemoglobin 12/12/2022 7 6 (L)    • Hematocrit 12/12/2022 27 8 (L)    • POC Glucose 12/11/2022 148 (H)    • PTT 12/12/2022 80 (H)    • POC Glucose 12/12/2022 109    • WBC 12/12/2022 3 59 (L)    • RBC 12/12/2022 2 41 (L)    • Hemoglobin 12/12/2022 7 0 (L)    • Hematocrit 12/12/2022 24 3 (L)    • MCV 12/12/2022 101 (H)    • MCH 12/12/2022 27 8    • MCHC 12/12/2022 27 6 (L)    • RDW 12/12/2022 19 2 (H)    • MPV 12/12/2022 10 3    • Platelets 54/19/2019 171    • nRBC 12/12/2022 1    • Neutrophils Relative 12/12/2022 65    • Immat GRANS % 12/12/2022 1    • Lymphocytes Relative 12/12/2022 19    • Monocytes Relative 12/12/2022 10    • Eosinophils Relative 12/12/2022 5    • Basophils Relative 12/12/2022 0    • Neutrophils Absolute 12/12/2022 2 36    • Immature Grans Absolute 12/12/2022 0 02    • Lymphocytes Absolute 12/12/2022 0 68    • Monocytes Absolute 12/12/2022 0 35    • Eosinophils Absolute 12/12/2022 0 17    • Basophils Absolute 12/12/2022 0 01    • Protime 12/12/2022 22 1 (H)    • INR 12/12/2022 1 90 (H)    • Procalcitonin 12/12/2022 0 27 (H)    • Vancomycin Rm 12/12/2022 22 4 (H)    • POC Glucose 12/12/2022 107        Imaging Studies: I have personally reviewed pertinent imaging studies

## 2022-12-13 ENCOUNTER — PREP FOR PROCEDURE (OUTPATIENT)
Dept: GASTROENTEROLOGY | Facility: CLINIC | Age: 62
End: 2022-12-13

## 2022-12-13 DIAGNOSIS — D50.0 IRON DEFICIENCY ANEMIA DUE TO CHRONIC BLOOD LOSS: ICD-10-CM

## 2022-12-13 DIAGNOSIS — D62 ACUTE BLOOD LOSS ANEMIA: Primary | ICD-10-CM

## 2022-12-13 LAB
ANION GAP SERPL CALCULATED.3IONS-SCNC: 5 MMOL/L (ref 4–13)
APTT PPP: 71 SECONDS (ref 23–37)
APTT PPP: 71 SECONDS (ref 23–37)
BASOPHILS # BLD AUTO: 0.01 THOUSANDS/ÂΜL (ref 0–0.1)
BASOPHILS NFR BLD AUTO: 0 % (ref 0–1)
BUN SERPL-MCNC: 14 MG/DL (ref 5–25)
CALCIUM SERPL-MCNC: 8.8 MG/DL (ref 8.4–10.2)
CHLORIDE SERPL-SCNC: 101 MMOL/L (ref 96–108)
CO2 SERPL-SCNC: 34 MMOL/L (ref 21–32)
CREAT SERPL-MCNC: 1.89 MG/DL (ref 0.6–1.3)
EOSINOPHIL # BLD AUTO: 0.17 THOUSAND/ÂΜL (ref 0–0.61)
EOSINOPHIL NFR BLD AUTO: 5 % (ref 0–6)
ERYTHROCYTE [DISTWIDTH] IN BLOOD BY AUTOMATED COUNT: 18.9 % (ref 11.6–15.1)
GFR SERPL CREATININE-BSD FRML MDRD: 27 ML/MIN/1.73SQ M
GLUCOSE SERPL-MCNC: 149 MG/DL (ref 65–140)
GLUCOSE SERPL-MCNC: 156 MG/DL (ref 65–140)
GLUCOSE SERPL-MCNC: 158 MG/DL (ref 65–140)
GLUCOSE SERPL-MCNC: 184 MG/DL (ref 65–140)
GLUCOSE SERPL-MCNC: 229 MG/DL (ref 65–140)
HCT VFR BLD AUTO: 26.7 % (ref 34.8–46.1)
HGB BLD-MCNC: 7.5 G/DL (ref 11.5–15.4)
IMM GRANULOCYTES # BLD AUTO: 0.02 THOUSAND/UL (ref 0–0.2)
IMM GRANULOCYTES NFR BLD AUTO: 1 % (ref 0–2)
INR PPP: 1.77 (ref 0.84–1.19)
LYMPHOCYTES # BLD AUTO: 0.72 THOUSANDS/ÂΜL (ref 0.6–4.47)
LYMPHOCYTES NFR BLD AUTO: 21 % (ref 14–44)
MCH RBC QN AUTO: 28.1 PG (ref 26.8–34.3)
MCHC RBC AUTO-ENTMCNC: 28.1 G/DL (ref 31.4–37.4)
MCV RBC AUTO: 100 FL (ref 82–98)
MONOCYTES # BLD AUTO: 0.28 THOUSAND/ÂΜL (ref 0.17–1.22)
MONOCYTES NFR BLD AUTO: 8 % (ref 4–12)
NEUTROPHILS # BLD AUTO: 2.2 THOUSANDS/ÂΜL (ref 1.85–7.62)
NEUTS SEG NFR BLD AUTO: 65 % (ref 43–75)
NRBC BLD AUTO-RTO: 1 /100 WBCS
PLATELET # BLD AUTO: 189 THOUSANDS/UL (ref 149–390)
PMV BLD AUTO: 10.1 FL (ref 8.9–12.7)
POTASSIUM SERPL-SCNC: 4 MMOL/L (ref 3.5–5.3)
PROTHROMBIN TIME: 20.9 SECONDS (ref 11.6–14.5)
RBC # BLD AUTO: 2.67 MILLION/UL (ref 3.81–5.12)
SODIUM SERPL-SCNC: 140 MMOL/L (ref 135–147)
VANCOMYCIN SERPL-MCNC: 17 UG/ML (ref 10–20)
WBC # BLD AUTO: 3.4 THOUSAND/UL (ref 4.31–10.16)

## 2022-12-13 RX ORDER — PANTOPRAZOLE SODIUM 40 MG/1
40 TABLET, DELAYED RELEASE ORAL
Status: DISCONTINUED | OUTPATIENT
Start: 2022-12-13 | End: 2022-12-14 | Stop reason: HOSPADM

## 2022-12-13 RX ORDER — LORAZEPAM 0.5 MG/1
0.5 TABLET ORAL ONCE
Status: COMPLETED | OUTPATIENT
Start: 2022-12-13 | End: 2022-12-13

## 2022-12-13 RX ADMIN — INSULIN LISPRO 1 UNITS: 100 INJECTION, SOLUTION INTRAVENOUS; SUBCUTANEOUS at 12:27

## 2022-12-13 RX ADMIN — PANTOPRAZOLE SODIUM 40 MG: 40 INJECTION, POWDER, FOR SOLUTION INTRAVENOUS at 08:43

## 2022-12-13 RX ADMIN — METOPROLOL TARTRATE 25 MG: 25 TABLET, FILM COATED ORAL at 21:12

## 2022-12-13 RX ADMIN — LORAZEPAM 0.5 MG: 0.5 TABLET ORAL at 05:10

## 2022-12-13 RX ADMIN — LORAZEPAM 0.5 MG: 0.5 TABLET ORAL at 21:12

## 2022-12-13 RX ADMIN — HYDROXYZINE HYDROCHLORIDE 50 MG: 25 TABLET ORAL at 21:12

## 2022-12-13 RX ADMIN — MICONAZOLE NITRATE 1 APPLICATION: 20 CREAM TOPICAL at 17:07

## 2022-12-13 RX ADMIN — FAMOTIDINE 20 MG: 20 TABLET ORAL at 08:46

## 2022-12-13 RX ADMIN — MICONAZOLE NITRATE: 20 CREAM TOPICAL at 08:48

## 2022-12-13 RX ADMIN — Medication 12.5 MG: at 08:46

## 2022-12-13 RX ADMIN — FERROUS SULFATE TAB 325 MG (65 MG ELEMENTAL FE) 325 MG: 325 (65 FE) TAB at 08:46

## 2022-12-13 RX ADMIN — GUAIFENESIN 1200 MG: 600 TABLET ORAL at 18:25

## 2022-12-13 RX ADMIN — LEVOTHYROXINE SODIUM 100 MCG: 100 TABLET ORAL at 05:10

## 2022-12-13 RX ADMIN — PANTOPRAZOLE SODIUM 40 MG: 40 TABLET, DELAYED RELEASE ORAL at 21:12

## 2022-12-13 RX ADMIN — WARFARIN SODIUM 3 MG: 3 TABLET ORAL at 17:06

## 2022-12-13 RX ADMIN — ATORVASTATIN CALCIUM 10 MG: 10 TABLET, FILM COATED ORAL at 08:46

## 2022-12-13 RX ADMIN — INSULIN LISPRO 1 UNITS: 100 INJECTION, SOLUTION INTRAVENOUS; SUBCUTANEOUS at 21:12

## 2022-12-13 NOTE — ASSESSMENT & PLAN NOTE
Lab Results   Component Value Date    HGBA1C 7 0 (H) 11/01/2022       Recent Labs     12/12/22  1143 12/12/22  1547 12/12/22  2113 12/13/22  0810   POCGLU 156* 132 149* 158*     · BG has been more elevated likely from infection      · Continue ISS TID with meals and qHS  · Hypoglycemia protocol  · Diabetic diet  · Adjust as needed to maintain BG goal 140-180    Blood Sugar Average: Last 72 hrs:  (P) 100 [id number]

## 2022-12-13 NOTE — PROGRESS NOTES
The pantoprazole has / have been converted to Oral [facility] Ad[facility] as approved by the Pharmacy and Therapeutics Committee  The patient met all eligible criteria:  3 Age = 24years old   2) Received at least one dose of the IV form   3) Receiving at least one other scheduled oral/enteral medication   4) Tolerating an oral/enteral diet   and did not have any exclusions:   1) Critical care patient   2) Active GI bleed (IF assessing H2RAs or PPIs)   3) Continuous tube feeding (IF assessing cipro, doxycycline, levofloxacin, minocycline, rifampin, or voriconazole)   4) Receiving PO vancomycin (IF assessing metronidazole)   5) Persistent nausea and/or vomiting   6) Ileus or gastrointestinal obstruction   7) Amy/nasogastric tube set for continuous suction   8) Specific order not to automatically convert to PO (in the order's comments or if discussed in the most recent Infectious Disease or primary team's progress notes)

## 2022-12-13 NOTE — CASE MANAGEMENT
Case Management Assessment & Discharge Planning Note    Patient name Eloise Faust  Location W [facility] /[address] [id number] MRN [id number]  : 1960 Date 2022       Current Admission Date: 2022  Current Admission Diagnosis:Abdominal wall cellulitis   Patient Active Problem List    Diagnosis Date Noted   • Abdominal wall cellulitis 2022   • CKD (chronic kidney disease) stage 4, GFR 15-29 ml/min ([facility] ) 2022   • Acute blood loss anemia 2022   • History of hysterectomy 2022   • Panniculitis 2022   • Acute on chronic respiratory failure with hypoxia and hypercapnia ([facility] ) 2022   • Abnormal CT scan 2022   • Panniculus 2022   • Bilateral pleural effusion 2022   • Pneumonia due to infectious organism 2022   • Cardiomegaly 2022   • Diabetic polyneuropathy associated with type 2 diabetes mellitus ([facility] ) 10/26/2022   • Leg wound, left, initial encounter 10/26/2022   • Diabetic neuropathy ([facility] ) 10/26/2022   • Tinea 2022   • Diabetic nephropathy associated with type 2 diabetes mellitus ([facility] ) 2022   • Depression, recurrent ([facility] ) 2022   • Diabetes mellitus type 2 in obese ([facility] ) 2022   • Pre-ulcerative corn or callous 2022   • Supratherapeutic INR 2021   • Morbid obesity with BMI of 50 0-59 9, adult ([facility] ) 2021   • Secondary hyperparathyroidism of renal origin ([facility] ) 2021   • GERD (gastroesophageal reflux disease) 2020   • Anxiety 2020   • Chronic constipation 2020   • Severe sepsis ([facility] ) 2020   • Acute renal failure superimposed on chronic kidney disease ([facility] ) 2020   • Paroxysmal atrial fibrillation ([facility] ) 2020   • COPD with asthma ([facility] ) 2020   • Hypothyroid 2020   • Cellulitis 09/10/2020   • H/O right nephrectomy 09/10/2020   • SOB (shortness of breath) 2020   • Hyperparathyroidism ([facility] ) 2020   • Dyslipidemia 2019   • Anemia due to stage 3b chronic kidney disease ([facility] ) 10/04/2018   • Chronic kidney disease, stage III (moderate) ([facility] ) 10/04/2018   • Hypertensive chronic kidney disease with stage 1 through stage 4 chronic kidney disease, or unspecified chronic kidney disease 10/04/2018   • Persistent proteinuria 10/04/2018   • Iron deficiency 10/04/2018      LOS (days): 2  Geometric Mean LOS (GMLOS) (days): 5 00  Days to GMLOS:2 7     OBJECTIVE:  PATIENT READMITTED TO HOSPITAL  Risk of Unplanned Readmission Score: 30 82         Current admission status: Inpatient       Preferred Pharmacy:   [id number] Emma Norton, [address] [place] [address],4Th Floor  [address] [facility],Fourth Floor  Phone: [phone] Fax: [phone]    Primary Care Provider: Darryl Basurto MD    Primary Insurance: MEDICARE  Secondary Insurance: Moz    ASSESSMENT:  Active Health Care Proxies    There are no active Health Care Proxies on file         Readmission Root Cause  30 Day Readmission: No    Patient Information  Admitted from[de-identified] Home  Mental Status: Alert  During Assessment patient was accompanied by: Spouse  Assessment information provided by[de-identified] Patient, Spouse  Primary Caregiver: Spouse  Caregiver's Name[de-identified] Yasmine Mixon Relationship to Patient[de-identified] Significant Other  Caregiver's Telephone Number[de-identified] [phone]  Support Systems: Spouse/significant other  [place] of Residence: [address] [place] [address] do you live in?: [place]  Type of Current Residence: Apartment  Floor Level: 1  Upon entering residence, is there a bedroom on the main floor (no further steps)?: Yes  Upon entering residence, is there a bathroom on the main floor (no further steps)?: Yes  In the last 12 months, was there a time when you were not able to pay the mortgage or rent on time?: No  In the last 12 months, how many places have you lived?: 1  In the last 12 months, was there a time when you did not have a steady place to sleep or slept in a shelter (including now)?: No  Living Arrangements: Lives w/ Spouse/significant other    Activities of Daily Living Prior to Admission  Functional Status: Assistance  Completes ADLs independently?: No  Level of ADL dependence: Assistance  Ambulates independently?: No  Level of ambulatory dependence: Total Dependent  Does patient use assisted devices?: Yes  Assisted Devices (DME) used: Wheelchair, [employer], Nebulizer, Shower Chair, Home Oxygen concentrator, Bedside Commode, [employer] wheelchair  DME Company Name (respiratory supplies):  [employer]  Does patient currently own DME?: Yes  What DME does the patient currently own?: Portable Oxygen tanks, Bedside Commode, Nebulizer, Hospital Bed, Home Oxygen concentrator, Shower Chair, Wheelchair, Electric Wheelchair  Does patient have a history of Outpatient Therapy (PT/OT)?: Yes  Does the patient have a history of Short-Term Rehab?: Yes  Does patient have a history of HHC?: Yes  Does patient currently have [facility]?: Yes    Current Home Health Care  Type of Current Home Care Services: Nurse visit, Home PT, [address][de-identified] [address] [place] [address] Provider[de-identified] PCP    Patient Information Continued  Income Source: Pension/intermediate  Does patient have prescription coverage?: Yes  Within the past 12 months, you worried that your food would run out before you got the money to buy more : Never true  Within the past 12 months, the food you bought just didn't last and you didn't have money to get more : Never true  Food insecurity resource given?: N/A         Means of Transportation  [facility] Family transport  In the past 12 months, has lack of transportation kept you from medical appointments or from getting medications?: No  In the past 12 months, has lack of transportation kept you from meetings, work, or from getting things needed for daily living?: No  Was application for public transport provided?: N/A        DISCHARGE DETAILS:    Discharge planning discussed with[de-identified] patient and girlfriend Roberto Erp of Choice: Yes  Comments - Freedom of Choice: re: [address]  CM contacted family/caregiver?: Yes  Were Treatment Team discharge recommendations reviewed with patient/caregiver?: Yes  Did patient/caregiver verbalize understanding of patient care needs?: Yes  Were patient/caregiver advised of the risks associated with not following Treatment Team discharge recommendations?: Yes    Contacts  Patient Contacts: Adeola Farley  Relationship to Patient[de-identified] Family  Contact Method: In Person  Reason/Outcome: Discharge [address]         Is the patient interested in [address] at discharge?: Yes  [address] requested[de-identified] Nursing, Occupational Therapy, Physical [address] Name[de-identified] [facility] Provider[de-identified] PCP  Home Health Services Needed[de-identified] Evaluate Functional Status and Safety, Gait/ADL Training, Strengthening/Theraputic Exercises to Improve Function  Homebound Criteria Met[de-identified] Requires the Assistance of Another Person for Safe Ambulation or to Leave the Home, Uses an Assist Device (i e  cane, walker, etc)  Supporting Clincal Findings[de-identified] Limited Endurance, Requires Oxygen    DME Referral Provided  Referral made for DME?: No    Other Referral/Resources/Interventions Provided:  Interventions: [facility]  Referral Comments: CM met with patient and girlfriend Adeola Farley at bedside to discuss discharge planning  Patient requesting a referral sent back to [facility] for continuation of care- CM to send in [place]  Patient and girlfriend would like [facility] transport set up tomorrow, states there are no steps to get into home  [facility] and follow up as able      Would you like to participate in our [address] service program?  : No - Declined

## 2022-12-13 NOTE — ASSESSMENT & PLAN NOTE
· Tender area of erythema and induration to subq area of R abd/pannus  Fortunately patient is afebrile w/o leukocytosis, though has been feeling weak and BG has been higher recently which she was seen at [facility] ED for  Would treat as infection, hx of panniculitis  · Given vanc and cefepime in the ED  Continue Vancomycin  · There also may be yeast component in the pannus folds, would start nystatin powder twice daily  · Monitor fever curve and WBC    · Check MRSA, if negative can dc vanco and change to Ancef

## 2022-12-13 NOTE — ASSESSMENT & PLAN NOTE
· With RVR this AM   · Note she felt very anxious and had a panic attack as well  · INR subtherapeutic  Resumed Coumadin 12/12    · Continue Heparin gtt for bridging  · Increase Lopressor to 25 mg twice daily  · Monitor HR

## 2022-12-13 NOTE — PROGRESS NOTES
Bimal Waterman is a 60 y o  female who is currently ordered Vancomycin IV with management by the Pharmacy Consult service  Relevant clinical data and objective / subjective history reviewed  Vancomycin Assessment:  Indication and Goal AUC/Trough: Soft tissue (goal -600, trough >10)    Micro:     Renal Function:  SCr: 1 89 mg/dL  CrCl: 40 0 mL/min  Renal replacement: not on dialysis  Days of Therapy: 3  Current Dose: 1000 mg as needed for vancomycin level < 15  Vancomycin Plan:  New Dosin mg as needed for vancomycin level < 15  Next Level: 22 @ 0600  Renal Function Monitoring: Daily BMP and [place] will continue to follow closely for s/sx of nephrotoxicity, infusion reactions and appropriateness of therapy  BMP and CBC will be ordered per protocol  We will continue to follow the patient’s culture results and clinical progress daily      Kirk Pittman, Pharmacist

## 2022-12-13 NOTE — PLAN OF CARE
Problem: Potential for Falls  Goal: Patient will remain free of falls  Description: INTERVENTIONS:  - Educate patient/family on patient safety including physical limitations  - Instruct patient to call for assistance with activity   - Consult OT/PT to assist with strengthening/mobility   - Keep Call bell within reach  - Keep bed low and locked with side rails adjusted as appropriate  - Keep care items and personal belongings within reach  - Initiate and maintain comfort rounds  - Make Fall Risk Sign visible to staff  - Apply yellow socks and bracelet for high fall risk patients  - Consider moving patient to room near nurses station  Outcome: Progressing     Problem: PAIN - ADULT  Goal: Verbalizes/displays adequate comfort level or baseline comfort level  Description: Interventions:  - Encourage patient to monitor pain and request assistance  - Assess pain using appropriate pain scale  - Administer analgesics based on type and severity of pain and evaluate response  - Implement non-pharmacological measures as appropriate and evaluate response  - Consider cultural and social influences on pain and pain management  - Notify physician/advanced practitioner if interventions unsuccessful or patient reports new pain  Outcome: Progressing     Problem: INFECTION - ADULT  Goal: Absence or prevention of progression during hospitalization  Description: INTERVENTIONS:  - Assess and monitor for signs and symptoms of infection  - Monitor lab/diagnostic results  - Monitor all insertion sites, i e  indwelling lines, tubes, and drains  - Monitor endotracheal if appropriate and nasal secretions for changes in amount and color  - [place] appropriate cooling/warming therapies per order  - Administer medications as ordered  - Instruct and encourage patient and family to use good hand hygiene technique  - Identify and instruct in appropriate isolation precautions for identified infection/condition  Outcome: Progressing     Problem: SAFETY ADULT  Goal: Patient will remain free of falls  Description: INTERVENTIONS:  - Educate patient/family on patient safety including physical limitations  - Instruct patient to call for assistance with activity   - Consult OT/PT to assist with strengthening/mobility   - Keep Call bell within reach  - Keep bed low and locked with side rails adjusted as appropriate  - Keep care items and personal belongings within reach  - Initiate and maintain comfort rounds  - Make Fall Risk Sign visible to staff  - Apply yellow socks and bracelet for high fall risk patients  - Consider moving patient to room near nurses station  Outcome: Progressing  Goal: Maintain or return to baseline ADL function  Description: INTERVENTIONS:  -  Assess patient's ability to carry out ADLs; assess patient's baseline for ADL function and identify physical deficits which impact ability to perform ADLs (bathing, care of mouth/teeth, toileting, grooming, dressing, etc )  - Assess/evaluate cause of self-care deficits   - Assess range of motion  - Assess patient's mobility; develop plan if impaired  - Assess patient's need for assistive devices and provide as appropriate  - Encourage maximum independence but intervene and supervise when necessary  - Involve family in performance of ADLs  - Assess for home care needs following discharge   - Consider OT consult to assist with ADL evaluation and planning for discharge  - Provide patient education as appropriate  Outcome: Progressing  Goal: Maintains/Returns to pre admission functional level  Description: INTERVENTIONS:  - Perform BMAT or MOVE assessment daily    - Set and communicate daily mobility goal to care team and patient/family/caregiver     - Collaborate with rehabilitation services on mobility goals if consulted  - Dangle patient 3 times a day  - Stand patient 6 times a day  - Ambulate patient 4 times a day  - Out of bed to chair 3 times a day   - Out of bed for meals 3 times a day  - Out of bed for toileting  - Record patient progress and toleration of activity level   Outcome: Progressing     Problem: DISCHARGE PLANNING  Goal: Discharge to home or other facility with appropriate resources  Description: INTERVENTIONS:  - Identify barriers to discharge w/patient and caregiver  - Arrange for needed discharge resources and transportation as appropriate  - Identify discharge learning needs (meds, wound care, etc )  - Arrange for interpretive services to assist at discharge as needed  - Refer to Case Management Department for coordinating discharge planning if the patient needs post-hospital services based on physician/advanced practitioner order or complex needs related to functional status, cognitive ability, or social support system  Outcome: Progressing     Problem: Knowledge Deficit  Goal: Patient/family/caregiver demonstrates understanding of disease process, treatment plan, medications, and discharge instructions  Description: Complete learning assessment and assess knowledge base    Interventions:  - Provide teaching at level of understanding  - Provide teaching via preferred learning methods  Outcome: Progressing     Problem: SKIN/TISSUE INTEGRITY - ADULT  Goal: Skin Integrity remains intact(Skin Breakdown Prevention)  Description: Assess:  -Perform [place] assessment every shift  -Clean and moisturize skin every day  -Inspect skin when repositioning, toileting, and assisting with ADLS  -Assess under medical devices such as masimo/IV every 2 hours  -Assess extremities for adequate circulation and sensation     Bed Management:  -Have minimal linens on bed & keep smooth, unwrinkled  -Change linens as needed when moist or perspiring  -Avoid sitting or lying in one position for more than 2 hours while in bed  -Keep HOB at 30 degrees     Toileting:  -Offer bedside commode  -Assess for incontinence every hour  -Use incontinent care products after each incontinent episode such as purewick    Activity:  -Encourage activity   -Encourage or provide ROM exercises   -Turn and reposition patient every 2 Hours  -Use appropriate equipment to lift or move patient in bed  -Instruct/ Assist with weight shifting every 2 hours when out of bed in chair  -Consider limitation of chair time 2 hour intervals    Skin Care:  -Avoid use of baby powder, tape, friction and shearing, hot water or constrictive clothing  -Do not massage red bony areas    Next Steps:  -Teach patient strategies to minimize risks such as keeping skin dry   -Consider consults to  interdisciplinary teams such as wound care  Outcome: Progressing  Goal: Incision(s), wounds(s) or drain site(s) healing without S/S of infection  Description: INTERVENTIONS  - Assess and document dressing, incision, wound bed, drain sites and surrounding tissue  - Provide patient and family education  - Perform skin care/dressing changes every day   Outcome: Progressing  Goal: Pressure injury heals and does not worsen  Description: Interventions:  - Implement low air loss mattress or specialty surface (Criteria met)  - Apply silicone foam dressing  - Instruct/assist with weight shifting every 120 minutes when in chair   - Limit chair time to 2 hour intervals  - Use special pressure reducing interventions such as waffle cushion when in chair   - Apply fecal or urinary incontinence containment device   - Turn and reposition patient & offload bony prominences every 2 hours   - Utilize friction reducing device or surface for transfers   - Consider consults to  interdisciplinary teams such as wound care  - Use incontinent care products after each incontinent episode such as purewick  - Consider nutrition services referral as needed  Outcome: Progressing     Problem: MOBILITY - ADULT  Goal: Maintain or return to baseline ADL function  Description: INTERVENTIONS:  -  Assess patient's ability to carry out ADLs; assess patient's baseline for ADL function and identify physical deficits which impact ability to perform ADLs (bathing, care of mouth/teeth, toileting, grooming, dressing, etc )  - Assess/evaluate cause of self-care deficits   - Assess range of motion  - Assess patient's mobility; develop plan if impaired  - Assess patient's need for assistive devices and provide as appropriate  - Encourage maximum independence but intervene and supervise when necessary  - Involve family in performance of ADLs  - Assess for home care needs following discharge   - Consider OT consult to assist with ADL evaluation and planning for discharge  - Provide patient education as appropriate  Outcome: Progressing  Goal: Maintains/Returns to pre admission functional level  Description: INTERVENTIONS:  - Perform BMAT or MOVE assessment daily    - Set and communicate daily mobility goal to care team and patient/family/caregiver  - Collaborate with rehabilitation services on mobility goals if consulted  - Dangle patient 3 times a day  - Stand patient 6 times a day  - Ambulate patient 4 times a day  - Out of bed to chair 3 times a day   - Out of bed for meals 3 times a day  - Out of bed for toileting  - Record patient progress and toleration of activity level   Outcome: Progressing     Problem: Nutrition/Hydration-ADULT  Goal: Nutrient/Hydration intake appropriate for improving, restoring or maintaining nutritional needs  Description: Monitor and assess patient's nutrition/hydration status for malnutrition  Collaborate with interdisciplinary team and initiate plan and interventions as ordered  Monitor patient's weight and dietary intake as ordered or per policy  Utilize nutrition screening tool and intervene as necessary  Determine patient's food preferences and provide high-protein, high-caloric foods as appropriate       INTERVENTIONS:  - Monitor oral intake, urinary output, labs, and treatment plans  - Assess nutrition and hydration status and recommend course of action  - Evaluate amount of meals eaten  - Assist patient with eating if necessary   - Allow adequate time for meals  - Recommend/ encourage appropriate diets, oral nutritional supplements, and vitamin/mineral supplements  - Order, calculate, and assess calorie counts as needed  - Recommend, monitor, and adjust tube feedings and TPN/PPN based on assessed needs  - Assess need for intravenous fluids  - Provide specific nutrition/hydration education as appropriate  - Include patient/family/caregiver in decisions related to nutrition  Outcome: Progressing     Problem: Prexisting or High Potential for Compromised Skin Integrity  Goal: Skin integrity is maintained or improved  Description: INTERVENTIONS:  - Identify patients at risk for skin breakdown  - Assess and monitor skin integrity  - Assess and monitor nutrition and hydration status  - Monitor labs   - Assess for incontinence   - Turn and reposition patient  - Assist with mobility/ambulation  - Relieve pressure over bony prominences  - Avoid friction and shearing  - Provide appropriate hygiene as needed including keeping skin clean and dry  - Evaluate need for skin moisturizer/barrier cream  - Collaborate with interdisciplinary team   - Patient/family teaching  - Consider wound care consult   Outcome: Progressing

## 2022-12-13 NOTE — PROGRESS NOTES
[facility]  Progress Note - Hakan Sicks 1960, 60 y o  female MRN: [id number]  Unit/Bed#: W -89 Encounter: [id number]  Primary Care Provider: Bladimir Gray MD   Date and time admitted to hospital: 12/11/2022  1:07 AM    * Abdominal wall cellulitis  Assessment & Plan  · Tender area of erythema and induration to subq area of R abd/pannus  Fortunately patient is afebrile w/o leukocytosis, though has been feeling weak and BG has been higher recently which she was seen at [facility] ED for  Would treat as infection, hx of panniculitis  · Given vanc and cefepime in the ED  Continue Vancomycin  · There also may be yeast component in the pannus folds, would start nystatin powder twice daily  · Monitor fever curve and WBC  · Check MRSA, if negative can dc vanco and change to Ancef    Paroxysmal atrial fibrillation (HCC)  Assessment & Plan  · With RVR this AM   · Note she felt very anxious and had a panic attack as well  · INR subtherapeutic  Resumed Coumadin 12/12  · Continue Heparin gtt for bridging  · Increase Lopressor to 25 mg twice daily  · Monitor HR    CKD (chronic kidney disease) stage 4, GFR 15-29 ml/min [facility])  Assessment & Plan  Lab Results   Component Value Date    EGFR 23 12/11/2022    EGFR 22 12/10/2022    EGFR 29 12/02/2022    CREATININE 2 19 (H) 12/11/2022    CREATININE 2 22 (H) 12/10/2022    CREATININE 1 79 (H) 12/02/2022   · sCR at baseline  Continue to monitor    Acute blood loss anemia  Assessment & Plan  · Has been evaluated multiple times in past with EGD and c-scope recently  Acute on chronic  No active evidence of bleeding at this time  Concern that this may be multifactorial - advanced CKD, hematologic?  · S/p 1u pRBC  Transfuse for Hgb <7 symptomatic anemia  · Monitor for signs of bleeding  Check occult blood  · Hemoglobin stable at 7 5  · IV PPI BID  · Non-ulcerogenic diet  · GI consult - no inpatient recs   Follow up for outpatient capsule endoscopy  Acute on chronic respiratory failure with hypoxia and hypercapnia (HCC)  Assessment & Plan  · Chronically on 3L at baseline  Currently at baseline  Diabetes mellitus type 2 in obese [facility])  Assessment & Plan  Lab Results   Component Value Date    HGBA1C 7 0 (H) 2022       Recent Labs     22  1143 22  1547 22  2113 22  0810   POCGLU 156* 132 149* 158*     · BG has been more elevated likely from infection  · Continue ISS TID with meals and qHS  · Hypoglycemia protocol  · Diabetic diet  · Adjust as needed to maintain BG goal 140-180    Blood Sugar Average: Last 72 hrs:  (P) 977 [id number]    Morbid obesity with BMI of 50 0-59 9, adult (HCC)  Assessment & Plan  · Nutrition consult    COPD with asthma ([facility] [address] )  Assessment & Plan  · w/o exacerbation  · Monitor respiratory status  VTE Pharmacologic Prophylaxis: VTE Score: 3 Moderate Risk (Score 3-4) - Pharmacological DVT Prophylaxis Ordered: heparin drip  Patient Centered Rounds: I performed bedside rounds with nursing staff today  Discussions with Specialists or Other Care Team Provider: matheus, rn    Education and Discussions with Family / Patient: Updated  (significant other) at bedside  Time Spent for Care: 45 minutes  More than 50% of total time spent on counseling and coordination of care as described above  Current Length of Stay: 2 day(s)  Current Patient Status: Inpatient   Certification Statement: The patient will continue to require additional inpatient hospital stay due to abd celluliitis  Discharge Plan: Anticipate discharge tomorrow to home  Code Status: Level 1 - Full Code    Subjective:   Ports she feels better today  Still with some soreness in her abdomen  No chest pain or palpitations  Reports she had a panic attack this morning      Objective:     Vitals:   Temp (24hrs), Av °F (36 7 °C), Min:97 9 °F (36 6 °C), Max:98 1 °F (36 7 °C)    Temp:  [97 9 °F (36 6 °C)-98 1 °F (36 7 °C)] 97 9 °F (36 6 °C)  HR:  [107-138] 138  Resp:  [16-18] 16  BP: ()/(46-68) 133/65  SpO2:  [95 %-98 %] 95 %  Body mass index is 55 61 kg/m²  Input and Output Summary (last 24 hours):   No intake or output data in the 24 hours ending 12/13/22 0930    Physical Exam:   Physical Exam  Vitals and nursing note reviewed  Constitutional:       General: She is not in acute distress  Appearance: Normal appearance  She is obese  HENT:      Head: Normocephalic  Mouth/Throat:      Mouth: Mucous membranes are moist    Eyes:      General: No scleral icterus  Pupils: Pupils are equal, round, and reactive to light  Cardiovascular:      Rate and Rhythm: Regular rhythm  Tachycardia present  Heart sounds: No murmur heard  Pulmonary:      Effort: Pulmonary effort is normal  No respiratory distress  Breath sounds: Normal breath sounds  No wheezing, rhonchi or rales  Abdominal:      General: Bowel sounds are normal  There is no distension  Palpations: Abdomen is soft  Tenderness: There is no abdominal tenderness  Musculoskeletal:         General: No swelling  Right lower leg: No edema  Left lower leg: No edema  Skin:     Capillary Refill: Capillary refill takes less than 2 seconds  Findings: Erythema: abdominal wall (pannus)   Neurological:      General: No focal deficit present  Mental Status: She is alert and oriented to person, place, and time  Mental status is at baseline            Additional Data:     Labs:  Results from last 7 days   Lab Units 12/13/22  0321   WBC Thousand/uL 3 40*   HEMOGLOBIN g/dL 7 5*   HEMATOCRIT % 26 7*   PLATELETS Thousands/uL 189   NEUTROS PCT % 65   LYMPHS PCT % 21   MONOS PCT % 8   EOS PCT % 5     Results from last 7 days   Lab Units 12/11/22  0130   SODIUM mmol/L 138   POTASSIUM mmol/L 4 6   CHLORIDE mmol/L 108   CO2 mmol/L 32   BUN mg/dL 22   CREATININE mg/dL 2 19*   ANION GAP mmol/L -2*   CALCIUM mg/dL 8 3*   ALBUMIN g/dL 2 5*   TOTAL BILIRUBIN mg/dL 0 32   ALK PHOS U/L 112*   ALT U/L 11   AST U/L 13   GLUCOSE RANDOM mg/dL 188*     Results from last 7 days   Lab Units 12/13/22  0321   INR  1 77*     Results from last 7 days   Lab Units 12/13/22  0810 12/12/22  2113 12/12/22  1547 12/12/22  1143 12/12/22  0737 12/12/22  0049 12/11/22  2120 12/11/22  1548 12/11/22  1103 12/10/22  0625   POC GLUCOSE mg/dl 158* 149* 132 156* 107 109 148* 125 118 320*         Results from last 7 days   Lab Units 12/12/22  0443 12/11/22  1850 12/11/22  0130   LACTIC ACID mmol/L  --  1 1 1 4   PROCALCITONIN ng/ml 0 27*  --  0 23       Lines/Drains:  Invasive Devices     Peripheral Intravenous Line  Duration           Peripheral IV 12/11/22 Dorsal (posterior); Left Hand 2 days    Peripheral IV 12/11/22 Left Antecubital 2 days    Peripheral IV 12/11/22 Right Forearm 2 days                      Imaging: No pertinent imaging reviewed  Recent Cultures (last 7 days):   Results from last 7 days   Lab Units 12/11/22  0130   BLOOD CULTURE  No Growth at 48 hrs  No Growth at 48 hrs         Last 24 Hours Medication List:   Current Facility-Administered Medications   Medication Dose Route Frequency Provider Last Rate   • acetaminophen  650 mg Oral Q6H PRN Haroon Araiza MD     • albuterol  2 5 mg Nebulization Q4H PRN Nhan Tyler PA-C     • atorvastatin  10 mg Oral Daily Earline Tyler PA-C     • famotidine  20 mg Oral Daily Earline Tyler PA-C     • ferrous sulfate  325 mg Oral Daily With Breakfast Earline Tyler PA-C     • guaiFENesin  1,200 mg Oral BID Earline Tyler PA-C     • heparin (porcine)  3-20 Units/kg/hr (Order-Specific) Intravenous Titrated Haroon Araiza MD 8 1 Units/kg/hr (12/13/22 0533)   • hydrOXYzine HCL  50 mg Oral HS Nhan Tyler PA-C     • insulin lispro  1-5 Units Subcutaneous TID AC Haroon Araiza MD     • insulin lispro  1-5 Units Subcutaneous HS Haroon Araiza MD     • levothyroxine  100 mcg Oral Early Morning Earline Tyler PA-C     • LORazepam  0 5 mg Oral HS Earline Tyler PA-C     • metoprolol tartrate  25 mg Oral BID Farhad Walter PA-C     • EARLINE ANTIFUNGAL   Topical BID Bud , DO     • ondansetron  4 mg Intravenous Q6H PRN Faisal Pham MD     • pantoprazole  40 mg Intravenous Q12H 139 [facility], MD     • umeclidinium-vilanterol  1 puff Inhalation Daily Earline Tyler PA-C     • vancomycin  15 mg/kg (Adjusted) Intravenous Daily PRN Faisal Pham MD     • warfarin  3 mg Oral Daily (warfarin) Farhad Walter PA-C          Today, Patient Was Seen By: Cecile Ladd PA-C    **Please Note: This note may have been constructed using a voice recognition system  **

## 2022-12-14 ENCOUNTER — APPOINTMENT (INPATIENT)
Dept: CT IMAGING | Facility: HOSPITAL | Age: 62
End: 2022-12-14

## 2022-12-14 VITALS
OXYGEN SATURATION: 93 % | TEMPERATURE: 97.9 F | HEART RATE: 93 BPM | WEIGHT: 293 LBS | BODY MASS INDEX: 50.02 KG/M2 | SYSTOLIC BLOOD PRESSURE: 118 MMHG | RESPIRATION RATE: 20 BRPM | HEIGHT: 64 IN | DIASTOLIC BLOOD PRESSURE: 64 MMHG

## 2022-12-14 LAB
ANION GAP SERPL CALCULATED.3IONS-SCNC: 1 MMOL/L (ref 4–13)
APTT PPP: 91 SECONDS (ref 23–37)
ARTERIAL PATENCY WRIST A: YES
BASE EXCESS BLDA CALC-SCNC: 9.9 MMOL/L
BASOPHILS # BLD AUTO: 0.02 THOUSANDS/ÂΜL (ref 0–0.1)
BASOPHILS NFR BLD AUTO: 1 % (ref 0–1)
BUN SERPL-MCNC: 13 MG/DL (ref 5–25)
CALCIUM SERPL-MCNC: 8.5 MG/DL (ref 8.4–10.2)
CHLORIDE SERPL-SCNC: 103 MMOL/L (ref 96–108)
CO2 SERPL-SCNC: 33 MMOL/L (ref 21–32)
CREAT SERPL-MCNC: 1.85 MG/DL (ref 0.6–1.3)
EOSINOPHIL # BLD AUTO: 0.17 THOUSAND/ÂΜL (ref 0–0.61)
EOSINOPHIL NFR BLD AUTO: 4 % (ref 0–6)
ERYTHROCYTE [DISTWIDTH] IN BLOOD BY AUTOMATED COUNT: 19 % (ref 11.6–15.1)
ERYTHROCYTE [DISTWIDTH] IN BLOOD BY AUTOMATED COUNT: 19.1 % (ref 11.6–15.1)
GFR SERPL CREATININE-BSD FRML MDRD: 28 ML/MIN/1.73SQ M
GLUCOSE SERPL-MCNC: 123 MG/DL (ref 65–140)
GLUCOSE SERPL-MCNC: 133 MG/DL (ref 65–140)
GLUCOSE SERPL-MCNC: 136 MG/DL (ref 65–140)
GLUCOSE SERPL-MCNC: 146 MG/DL (ref 65–140)
HCO3 BLDA-SCNC: 36.5 MMOL/L (ref 22–28)
HCT VFR BLD AUTO: 25.8 % (ref 34.8–46.1)
HCT VFR BLD AUTO: 26.5 % (ref 34.8–46.1)
HGB BLD-MCNC: 7.2 G/DL (ref 11.5–15.4)
HGB BLD-MCNC: 7.5 G/DL (ref 11.5–15.4)
IMM GRANULOCYTES # BLD AUTO: 0.03 THOUSAND/UL (ref 0–0.2)
IMM GRANULOCYTES NFR BLD AUTO: 1 % (ref 0–2)
INR PPP: 2.13 (ref 0.84–1.19)
LACTATE SERPL-SCNC: 1.2 MMOL/L (ref 0.5–2)
LYMPHOCYTES # BLD AUTO: 0.85 THOUSANDS/ÂΜL (ref 0.6–4.47)
LYMPHOCYTES NFR BLD AUTO: 20 % (ref 14–44)
MAGNESIUM SERPL-MCNC: 1.7 MG/DL (ref 1.9–2.7)
MCH RBC QN AUTO: 28.5 PG (ref 26.8–34.3)
MCH RBC QN AUTO: 28.8 PG (ref 26.8–34.3)
MCHC RBC AUTO-ENTMCNC: 27.9 G/DL (ref 31.4–37.4)
MCHC RBC AUTO-ENTMCNC: 28.3 G/DL (ref 31.4–37.4)
MCV RBC AUTO: 102 FL (ref 82–98)
MCV RBC AUTO: 102 FL (ref 82–98)
MONOCYTES # BLD AUTO: 0.32 THOUSAND/ÂΜL (ref 0.17–1.22)
MONOCYTES NFR BLD AUTO: 8 % (ref 4–12)
MRSA NOSE QL CULT: NORMAL
NASAL CANNULA: 3
NEUTROPHILS # BLD AUTO: 2.84 THOUSANDS/ÂΜL (ref 1.85–7.62)
NEUTS SEG NFR BLD AUTO: 66 % (ref 43–75)
NRBC BLD AUTO-RTO: 0 /100 WBCS
O2 CT BLDA-SCNC: 10.5 ML/DL (ref 16–23)
OXYHGB MFR BLDA: 93 % (ref 94–97)
PCO2 BLDA: 64.3 MM HG (ref 36–44)
PH BLDA: 7.37 [PH] (ref 7.35–7.45)
PLATELET # BLD AUTO: 162 THOUSANDS/UL (ref 149–390)
PLATELET # BLD AUTO: 166 THOUSANDS/UL (ref 149–390)
PMV BLD AUTO: 10.2 FL (ref 8.9–12.7)
PMV BLD AUTO: 10.4 FL (ref 8.9–12.7)
PO2 BLDA: 80.6 MM HG (ref 75–129)
POTASSIUM SERPL-SCNC: 3.9 MMOL/L (ref 3.5–5.3)
PROTHROMBIN TIME: 24.1 SECONDS (ref 11.6–14.5)
RBC # BLD AUTO: 2.53 MILLION/UL (ref 3.81–5.12)
RBC # BLD AUTO: 2.6 MILLION/UL (ref 3.81–5.12)
SODIUM SERPL-SCNC: 137 MMOL/L (ref 135–147)
SPECIMEN SOURCE: ABNORMAL
VANCOMYCIN SERPL-MCNC: 11 UG/ML (ref 10–20)
WBC # BLD AUTO: 3.96 THOUSAND/UL (ref 4.31–10.16)
WBC # BLD AUTO: 4.23 THOUSAND/UL (ref 4.31–10.16)

## 2022-12-14 RX ORDER — CEPHALEXIN 500 MG/1
500 CAPSULE ORAL EVERY 6 HOURS SCHEDULED
Qty: 16 CAPSULE | Refills: 0 | Status: SHIPPED | OUTPATIENT
Start: 2022-12-14 | End: 2022-12-18

## 2022-12-14 RX ORDER — MAGNESIUM SULFATE HEPTAHYDRATE 40 MG/ML
2 INJECTION, SOLUTION INTRAVENOUS ONCE
Status: COMPLETED | OUTPATIENT
Start: 2022-12-14 | End: 2022-12-14

## 2022-12-14 RX ORDER — FUROSEMIDE 40 MG/1
40 TABLET ORAL DAILY
Qty: 90 TABLET | Refills: 0 | Status: SHIPPED | OUTPATIENT
Start: 2022-12-14 | End: 2023-03-14

## 2022-12-14 RX ADMIN — LEVOTHYROXINE SODIUM 100 MCG: 100 TABLET ORAL at 06:06

## 2022-12-14 RX ADMIN — PANTOPRAZOLE SODIUM 40 MG: 40 TABLET, DELAYED RELEASE ORAL at 08:51

## 2022-12-14 RX ADMIN — ATORVASTATIN CALCIUM 10 MG: 10 TABLET, FILM COATED ORAL at 08:51

## 2022-12-14 RX ADMIN — FAMOTIDINE 20 MG: 20 TABLET ORAL at 08:51

## 2022-12-14 RX ADMIN — METOPROLOL TARTRATE 25 MG: 25 TABLET, FILM COATED ORAL at 08:51

## 2022-12-14 RX ADMIN — MAGNESIUM SULFATE HEPTAHYDRATE 2 G: 40 INJECTION, SOLUTION INTRAVENOUS at 02:43

## 2022-12-14 RX ADMIN — MICONAZOLE NITRATE: 20 CREAM TOPICAL at 08:51

## 2022-12-14 RX ADMIN — GUAIFENESIN 1200 MG: 600 TABLET ORAL at 08:51

## 2022-12-14 RX ADMIN — FERROUS SULFATE TAB 325 MG (65 MG ELEMENTAL FE) 325 MG: 325 (65 FE) TAB at 08:52

## 2022-12-14 RX ADMIN — HEPARIN SODIUM 8.1 UNITS/KG/HR: 10000 INJECTION, SOLUTION INTRAVENOUS at 01:57

## 2022-12-14 NOTE — DISCHARGE SUMMARY
[facility]  Discharge- Leonidas Humphries 1960, 60 y o  female MRN: [id number]  Unit/Bed#: W -75 Encounter: [id number]  Primary Care Provider: Bridger Peña MD   Date and time admitted to hospital: 12/11/2022  1:07 AM    * Abdominal wall cellulitis  Assessment & Plan  · Tender area of erythema and induration to subq area of R abd/pannus  Fortunately patient is afebrile w/o leukocytosis, though has been feeling weak and BG has been higher recently which she was seen at [facility] ED for  · No hx of MRSA, completed 3 days of Vanco  Switch to PO Keflex for 7 total days  · There also may be yeast component in the pannus folds, would start nystatin powder twice daily  · Monitor fever curve and WBC  Paroxysmal atrial fibrillation (HCC)  Assessment & Plan  · INR is therapeutic at 2 1  · Continue Coumadin and BB    CKD (chronic kidney disease) stage 4, GFR 15-29 ml/min [facility])  Assessment & Plan  Lab Results   Component Value Date    EGFR 28 12/14/2022    EGFR 27 12/13/2022    EGFR 23 12/11/2022    CREATININE 1 85 (H) 12/14/2022    CREATININE 1 89 (H) 12/13/2022    CREATININE 2 19 (H) 12/11/2022   · sCR at baseline  Continue to monitor    Acute blood loss anemia  Assessment & Plan  · Has been evaluated multiple times in past with EGD and c-scope recently  Acute on chronic  No active evidence of bleeding at this time  Concern that this may be multifactorial - advanced CKD, hematologic?  · S/p 1u pRBC  Hemoglobin stable at 7 5  · Continue PPI BID  · Non-ulcerogenic diet  · GI consult - no inpatient recs  Follow up for outpatient capsule endoscopy  Acute on chronic respiratory failure with hypoxia and hypercapnia (HCC)  Assessment & Plan  · Chronically on 3L at baseline  Currently at baseline      Diabetes mellitus type 2 in obese [facility])  Assessment & Plan  Lab Results   Component Value Date    HGBA1C 7 0 (H) 11/01/2022       Recent Labs     12/13/22  1525 12/13/22 2041 12/14/22  0059 12/14/22  0803   POCGLU 149* 156* 136 123     · BG has been more elevated likely from infection  · Diabetic diet    Blood Sugar Average: Last 72 hrs:  (P) 293 [id number]    Morbid obesity with BMI of 50 0-59 9, adult ([facility])  Assessment & Plan  · Nutrition consult    COPD with asthma ([facility] [address] )  Assessment & Plan  · w/o exacerbation  · Monitor respiratory status  Medical Problems     Resolved Problems  Date Reviewed: 12/14/2022   None       Discharging Physician / Practitioner: José Antonio Pettit PA-C  PCP: Kacie Nuñez MD  Admission Date:   Admission Orders (From admission, onward)     Ordered        12/11/22 0740  INPATIENT ADMISSION  Once                      Discharge Date: 12/14/22    Consultations During Hospital Stay:  · GI    Procedures Performed:   · None     Significant Findings / Test Results:     CT a/p: "Inflammatory changes overlying the right ventral abdominal wall hernia suggestive of infection such as cellulitis"    Incidental Findings:   · None      Test Results Pending at Discharge (will require follow up): · None      Outpatient Tests Requested:  · None     Complications:  None     Reason for Admission: abdominal cellulitis    Hospital Course:   George Diaz is a 60 y o  female patient who originally presented to the hospital on 12/11/2022 due to abdominal cellulitis  Patient had a recent hospitalization at [facility] and was discharged  Subsequently developed abdominal pain and came back in for abdominal wall cellulitis  Was empirically treated with IV vancomycin  Initially was anemic and received 1 unit PRBC, hemoglobin stable in the mid sevens which is her baseline  Has been scheduled for outpatient capsule endoscopy next month  No active bleeding throughout her stay, initially started on a heparin infusion with Coumadin held however this was resumed  INR is therapeutic at 2 1    Intermittent episodes of A  fib RVR related to not receiving beta-blocker, initially held upon admission due to hypotension  Hemodynamics significantly improved and she was placed back on her antihypertensive regimen  She will be discharged with p o  Keflex for total of 7 days of antibiotics as well as outpatient follow-up for capsule endoscopy  Please note on the day of discharge also had intermittent confusion which was resolved, she was woken up in the middle of night and disoriented  On the time of my exam upon discharge she is alert, oriented x4    Please see above list of diagnoses and related plan for additional information  Condition at Discharge: stable    Discharge Day Visit / Exam:   Subjective: Upon my exam patient is alert and oriented x4  Able to correctly states she is in the hospital, it is December 2022 and that Guan[place] Mondragon is president  She knows she is here for an abdominal infection and is alert, answering questions appropriately  Her girlfriend reports she is at baseline  Vitals: Blood Pressure: 118/64 (12/14/22 0804)  Pulse: 93 (12/14/22 0804)  Temperature: 97 9 °F (36 6 °C) (12/14/22 0804)  Temp Source: Oral (12/14/22 0100)  Respirations: 20 (12/14/22 0100)  Height: 5' 4" (162 6 cm) (12/11/22 0930)  Weight - Scale: (!) 147 kg (324 lb) (12/11/22 0112)  SpO2: 93 % (12/14/22 0804)  Exam:   Physical Exam  Vitals and nursing note reviewed  Constitutional:       General: She is not in acute distress  Appearance: Normal appearance  She is obese  HENT:      Head: Normocephalic  Mouth/Throat:      Mouth: Mucous membranes are moist    Eyes:      General: No scleral icterus  Pupils: Pupils are equal, round, and reactive to light  Cardiovascular:      Rate and Rhythm: Normal rate  Rhythm irregular  Heart sounds: No murmur heard  Pulmonary:      Effort: Pulmonary effort is normal  No respiratory distress  Breath sounds: Normal breath sounds  No wheezing, rhonchi or rales     Abdominal:      General: Bowel sounds are normal  There is no distension  Palpations: Abdomen is soft  Tenderness: There is no abdominal tenderness  Musculoskeletal:         General: No swelling  Right lower leg: No edema  Left lower leg: No edema  Skin:     Capillary Refill: Capillary refill takes less than 2 seconds  Findings: Erythema (abdominal wall (pannus) - mild and improved) present  Neurological:      General: No focal deficit present  Mental Status: She is alert and oriented to person, place, and time  Mental status is at baseline  Discussion with Family: Updated  (significant other) at bedside  Discharge instructions/Information to patient and family:   See after visit summary for information provided to patient and family  Provisions for Follow-Up Care:  See after visit summary for information related to follow-up care and any pertinent home health orders  Disposition:   Home with VNA Services (Reminder: Complete face to face encounter)    Planned Readmission: no     Discharge Statement:  I spent 45 minutes discharging the patient  This time was spent on the day of discharge  I had direct contact with the patient on the day of discharge  Greater than 50% of the total time was spent examining patient, answering all patient questions, arranging and discussing plan of care with patient as well as directly providing post-discharge instructions  Additional time then spent on discharge activities  Discharge Medications:  See after visit summary for reconciled discharge medications provided to patient and/or family        **Please Note: This note may have been constructed using a voice recognition system**

## 2022-12-14 NOTE — DISCHARGE INSTR - AVS FIRST PAGE
Please start taking Keflex 4 times per day for the next 4 days  This is the antibiotic for the abdominal cellulitis  Initially we wanted to increase your metoprolol however we recommend going back on your 2 blood pressure medications at your home dosing and recording these  Please follow-up with your family doctor and if your blood pressures are high then we can increase this medication  Dr Mattie Renteria wants to resume your water pill, Lasix 40 mg daily

## 2022-12-14 NOTE — PROGRESS NOTES
Atul Batista is a 60 y o  female who was receiving Vancomycin IV with management by the Pharmacy Consult service for treatment of Soft tissue (goal -600, trough >10)    The patient's Vancomycin therapy has been completed / discontinued  Thank you for allowing us to take part in this patient's care  Pharmacy will sign-off now; please call or re-consult if there are any questions        Sara Alexander PharmD

## 2022-12-14 NOTE — PROGRESS NOTES
Wilbert Gaspar is a 60 y o  female who is currently ordered Vancomycin IV with management by the Pharmacy Consult service  Relevant clinical data and objective / subjective history reviewed  Vancomycin Assessment:  1  Indication and Goal AUC/Trough: Soft tissue (goal -600, trough >10)    2  Micro: No new Micro    3  Renal Function:  · SCr: 1 85 mg/dL  · CrCl: 40 9 mL/min  · Renal replacement: Not on dialysis  4  Days of Therapy: 4  5  Current Dose: 1250mg as needed for Vancomycin level <15   6  Vancomycin Plan:    1  New Dosing: Continue current dosing : 1250mg PRN trough level <15   2  Next Level: 12/15/22 @0600  3  Renal Function Monitoring: Daily BMP and [place] will continue to follow closely for s/sx of nephrotoxicity, infusion reactions and appropriateness of therapy  BMP and CBC will be ordered per protocol  We will continue to follow the patient’s culture results and clinical progress daily      Goyo Clayton, Pharmacist

## 2022-12-14 NOTE — ASSESSMENT & PLAN NOTE
Lab Results   Component Value Date    EGFR 28 12/14/2022    EGFR 27 12/13/2022    EGFR 23 12/11/2022    CREATININE 1 85 (H) 12/14/2022    CREATININE 1 89 (H) 12/13/2022    CREATININE 2 19 (H) 12/11/2022   · sCR at baseline    Continue to monitor

## 2022-12-14 NOTE — PLAN OF CARE
Problem: Potential for Falls  Goal: Patient will remain free of falls  Description: INTERVENTIONS:  - Educate patient/family on patient safety including physical limitations  - Instruct patient to call for assistance with activity   - Consult OT/PT to assist with strengthening/mobility   - Keep Call bell within reach  - Keep bed low and locked with side rails adjusted as appropriate  - Keep care items and personal belongings within reach  - Initiate and maintain comfort rounds  - Make Fall Risk Sign visible to staff  - Apply yellow socks and bracelet for high fall risk patients  - Consider moving patient to room near nurses station  Outcome: Progressing     Problem: PAIN - ADULT  Goal: Verbalizes/displays adequate comfort level or baseline comfort level  Description: Interventions:  - Encourage patient to monitor pain and request assistance  - Assess pain using appropriate pain scale  - Administer analgesics based on type and severity of pain and evaluate response  - Implement non-pharmacological measures as appropriate and evaluate response  - Consider cultural and social influences on pain and pain management  - Notify physician/advanced practitioner if interventions unsuccessful or patient reports new pain  Outcome: Progressing     Problem: INFECTION - ADULT  Goal: Absence or prevention of progression during hospitalization  Description: INTERVENTIONS:  - Assess and monitor for signs and symptoms of infection  - Monitor lab/diagnostic results  - Monitor all insertion sites, i e  indwelling lines, tubes, and drains  - Monitor endotracheal if appropriate and nasal secretions for changes in amount and color  - [place] appropriate cooling/warming therapies per order  - Administer medications as ordered  - Instruct and encourage patient and family to use good hand hygiene technique  - Identify and instruct in appropriate isolation precautions for identified infection/condition  Outcome: Progressing     Problem: SAFETY ADULT  Goal: Patient will remain free of falls  Description: INTERVENTIONS:  - Educate patient/family on patient safety including physical limitations  - Instruct patient to call for assistance with activity   - Consult OT/PT to assist with strengthening/mobility   - Keep Call bell within reach  - Keep bed low and locked with side rails adjusted as appropriate  - Keep care items and personal belongings within reach  - Initiate and maintain comfort rounds  - Make Fall Risk Sign visible to staff  - Apply yellow socks and bracelet for high fall risk patients  - Consider moving patient to room near nurses station  Outcome: Progressing  Goal: Maintain or return to baseline ADL function  Description: INTERVENTIONS:  -  Assess patient's ability to carry out ADLs; assess patient's baseline for ADL function and identify physical deficits which impact ability to perform ADLs (bathing, care of mouth/teeth, toileting, grooming, dressing, etc )  - Assess/evaluate cause of self-care deficits   - Assess range of motion  - Assess patient's mobility; develop plan if impaired  - Assess patient's need for assistive devices and provide as appropriate  - Encourage maximum independence but intervene and supervise when necessary  - Involve family in performance of ADLs  - Assess for home care needs following discharge   - Consider OT consult to assist with ADL evaluation and planning for discharge  - Provide patient education as appropriate  Outcome: Progressing  Goal: Maintains/Returns to pre admission functional level  Description: INTERVENTIONS:  - Perform BMAT or MOVE assessment daily    - Set and communicate daily mobility goal to care team and patient/family/caregiver     - Collaborate with rehabilitation services on mobility goals if consulted  - Dangle patient 3 times a day  - Stand patient 6 times a day  - Ambulate patient 4 times a day  - Out of bed to chair 3 times a day   - Out of bed for meals 3 times a day  - Out of bed for toileting  - Record patient progress and toleration of activity level   Outcome: Progressing     Problem: DISCHARGE PLANNING  Goal: Discharge to home or other facility with appropriate resources  Description: INTERVENTIONS:  - Identify barriers to discharge w/patient and caregiver  - Arrange for needed discharge resources and transportation as appropriate  - Identify discharge learning needs (meds, wound care, etc )  - Arrange for interpretive services to assist at discharge as needed  - Refer to Case Management Department for coordinating discharge planning if the patient needs post-hospital services based on physician/advanced practitioner order or complex needs related to functional status, cognitive ability, or social support system  Outcome: Progressing     Problem: Knowledge Deficit  Goal: Patient/family/caregiver demonstrates understanding of disease process, treatment plan, medications, and discharge instructions  Description: Complete learning assessment and assess knowledge base    Interventions:  - Provide teaching at level of understanding  - Provide teaching via preferred learning methods  Outcome: Progressing     Problem: SKIN/TISSUE INTEGRITY - ADULT  Goal: Skin Integrity remains intact(Skin Breakdown Prevention)  Description: Assess:  -Perform Carmelo assessment every shift  -Clean and moisturize skin every day  -Inspect skin when repositioning, toileting, and assisting with ADLS  -Assess under medical devices such as masimo/IV every 2 hours  -Assess extremities for adequate circulation and sensation     Bed Management:  -Have minimal linens on bed & keep smooth, unwrinkled  -Change linens as needed when moist or perspiring  -Avoid sitting or lying in one position for more than 2 hours while in bed  -Keep HOB at 30 degrees     Toileting:  -Offer bedside commode  -Assess for incontinence every hour  -Use incontinent care products after each incontinent episode such as purewick    Activity:  -Encourage activity   -Encourage or provide ROM exercises   -Turn and reposition patient every 2 Hours  -Use appropriate equipment to lift or move patient in bed  -Instruct/ Assist with weight shifting every 2 hours when out of bed in chair  -Consider limitation of chair time 2 hour intervals    Skin Care:  -Avoid use of baby powder, tape, friction and shearing, hot water or constrictive clothing  -Do not massage red bony areas    Next Steps:  -Teach patient strategies to minimize risks such as keeping skin dry   -Consider consults to  interdisciplinary teams such as wound care  Outcome: Progressing  Goal: Incision(s), wounds(s) or drain site(s) healing without S/S of infection  Description: INTERVENTIONS  - Assess and document dressing, incision, wound bed, drain sites and surrounding tissue  - Provide patient and family education  - Perform skin care/dressing changes every day   Outcome: Progressing  Goal: Pressure injury heals and does not worsen  Description: Interventions:  - Implement low air loss mattress or specialty surface (Criteria met)  - Apply silicone foam dressing  - Instruct/assist with weight shifting every 120 minutes when in chair   - Limit chair time to 2 hour intervals  - Use special pressure reducing interventions such as waffle cushion when in chair   - Apply fecal or urinary incontinence containment device   - Turn and reposition patient & offload bony prominences every 2 hours   - Utilize friction reducing device or surface for transfers   - Consider consults to  interdisciplinary teams such as wound care  - Use incontinent care products after each incontinent episode such as purewick  - Consider nutrition services referral as needed  Outcome: Progressing     Problem: MOBILITY - ADULT  Goal: Maintain or return to baseline ADL function  Description: INTERVENTIONS:  -  Assess patient's ability to carry out ADLs; assess patient's baseline for ADL function and identify physical deficits which impact ability to perform ADLs (bathing, care of mouth/teeth, toileting, grooming, dressing, etc )  - Assess/evaluate cause of self-care deficits   - Assess range of motion  - Assess patient's mobility; develop plan if impaired  - Assess patient's need for assistive devices and provide as appropriate  - Encourage maximum independence but intervene and supervise when necessary  - Involve family in performance of ADLs  - Assess for home care needs following discharge   - Consider OT consult to assist with ADL evaluation and planning for discharge  - Provide patient education as appropriate  Outcome: Progressing  Goal: Maintains/Returns to pre admission functional level  Description: INTERVENTIONS:  - Perform BMAT or MOVE assessment daily    - Set and communicate daily mobility goal to care team and patient/family/caregiver  - Collaborate with rehabilitation services on mobility goals if consulted  - Dangle patient 3 times a day  - Stand patient 6 times a day  - Ambulate patient 4 times a day  - Out of bed to chair 3 times a day   - Out of bed for meals 3 times a day  - Out of bed for toileting  - Record patient progress and toleration of activity level   Outcome: Progressing     Problem: Nutrition/Hydration-ADULT  Goal: Nutrient/Hydration intake appropriate for improving, restoring or maintaining nutritional needs  Description: Monitor and assess patient's nutrition/hydration status for malnutrition  Collaborate with interdisciplinary team and initiate plan and interventions as ordered  Monitor patient's weight and dietary intake as ordered or per policy  Utilize nutrition screening tool and intervene as necessary  Determine patient's food preferences and provide high-protein, high-caloric foods as appropriate       INTERVENTIONS:  - Monitor oral intake, urinary output, labs, and treatment plans  - Assess nutrition and hydration status and recommend course of action  - Evaluate amount of meals eaten  - Assist patient with eating if necessary   - Allow adequate time for meals  - Recommend/ encourage appropriate diets, oral nutritional supplements, and vitamin/mineral supplements  - Order, calculate, and assess calorie counts as needed  - Recommend, monitor, and adjust tube feedings and TPN/PPN based on assessed needs  - Assess need for intravenous fluids  - Provide specific nutrition/hydration education as appropriate  - Include patient/family/caregiver in decisions related to nutrition  Outcome: Progressing     Problem: Prexisting or High Potential for Compromised Skin Integrity  Goal: Skin integrity is maintained or improved  Description: INTERVENTIONS:  - Identify patients at risk for skin breakdown  - Assess and monitor skin integrity  - Assess and monitor nutrition and hydration status  - Monitor labs   - Assess for incontinence   - Turn and reposition patient  - Assist with mobility/ambulation  - Relieve pressure over bony prominences  - Avoid friction and shearing  - Provide appropriate hygiene as needed including keeping skin clean and dry  - Evaluate need for skin moisturizer/barrier cream  - Collaborate with interdisciplinary team   - Patient/family teaching  - Consider wound care consult   Outcome: Progressing

## 2022-12-14 NOTE — ASSESSMENT & PLAN NOTE
· Has been evaluated multiple times in past with EGD and c-scope recently  Acute on chronic  No active evidence of bleeding at this time  Concern that this may be multifactorial - advanced CKD, hematologic?  · S/p 1u pRBC  Hemoglobin stable at 7 5  · Continue PPI BID  · Non-ulcerogenic diet  · GI consult - no inpatient recs  Follow up for outpatient capsule endoscopy

## 2022-12-14 NOTE — CASE MANAGEMENT
Case Management Progress Note    Patient name Freda Rincon  Location W MS /W [id number] MRN [id number]  : 1960 Date 2022       LOS (days): 3  Geometric Mean LOS (GMLOS) (days): 5 00  Days to GMLOS:1 8        OBJECTIVE:        Current admission status: Inpatient  Preferred Pharmacy:   [address] [place] [address] #[id number] [facility], [address] [place] [address],[address]  [address] [place] [address],Fourth Floor  Phone: [phone] Fax: [phone]    Primary Care Provider: Zen Jolly MD    Primary Insurance: MEDICARE  Secondary Insurance: [address] [place] [address]    PROGRESS NOTE:  CM met with patient and girlfriend Evi Joe at bedside to discuss discharge plans  CM notified [facility] will follow up upon discharge, and that BLS is reserved for 12:30 PM with [place]  Patient and Evi Joe agreeable  IMM reviewed with patient and caregiver, patient and caregiver agrees with discharge determination

## 2022-12-14 NOTE — PLAN OF CARE
Problem: Potential for Falls  Goal: Patient will remain free of falls  Description: INTERVENTIONS:  - Educate patient/family on patient safety including physical limitations  - Instruct patient to call for assistance with activity   - Consult OT/PT to assist with strengthening/mobility   - Keep Call bell within reach  - Keep bed low and locked with side rails adjusted as appropriate  - Keep care items and personal belongings within reach  - Initiate and maintain comfort rounds  - Make Fall Risk Sign visible to staff  - Apply yellow socks and bracelet for high fall risk patients  - Consider moving patient to room near nurses station  Outcome: Adequate for Discharge     Problem: PAIN - ADULT  Goal: Verbalizes/displays adequate comfort level or baseline comfort level  Description: Interventions:  - Encourage patient to monitor pain and request assistance  - Assess pain using appropriate pain scale  - Administer analgesics based on type and severity of pain and evaluate response  - Implement non-pharmacological measures as appropriate and evaluate response  - Consider cultural and social influences on pain and pain management  - Notify physician/advanced practitioner if interventions unsuccessful or patient reports new pain  Outcome: Adequate for Discharge     Problem: INFECTION - ADULT  Goal: Absence or prevention of progression during hospitalization  Description: INTERVENTIONS:  - Assess and monitor for signs and symptoms of infection  - Monitor lab/diagnostic results  - Monitor all insertion sites, i e  indwelling lines, tubes, and drains  - Monitor endotracheal if appropriate and nasal secretions for changes in amount and color  - [place] appropriate cooling/warming therapies per order  - Administer medications as ordered  - Instruct and encourage patient and family to use good hand hygiene technique  - Identify and instruct in appropriate isolation precautions for identified infection/condition  Outcome: Adequate for Discharge     Problem: SAFETY ADULT  Goal: Patient will remain free of falls  Description: INTERVENTIONS:  - Educate patient/family on patient safety including physical limitations  - Instruct patient to call for assistance with activity   - Consult OT/PT to assist with strengthening/mobility   - Keep Call bell within reach  - Keep bed low and locked with side rails adjusted as appropriate  - Keep care items and personal belongings within reach  - Initiate and maintain comfort rounds  - Make Fall Risk Sign visible to staff  - Apply yellow socks and bracelet for high fall risk patients  - Consider moving patient to room near nurses station  Outcome: Adequate for Discharge  Goal: Maintain or return to baseline ADL function  Description: INTERVENTIONS:  -  Assess patient's ability to carry out ADLs; assess patient's baseline for ADL function and identify physical deficits which impact ability to perform ADLs (bathing, care of mouth/teeth, toileting, grooming, dressing, etc )  - Assess/evaluate cause of self-care deficits   - Assess range of motion  - Assess patient's mobility; develop plan if impaired  - Assess patient's need for assistive devices and provide as appropriate  - Encourage maximum independence but intervene and supervise when necessary  - Involve family in performance of ADLs  - Assess for home care needs following discharge   - Consider OT consult to assist with ADL evaluation and planning for discharge  - Provide patient education as appropriate  Outcome: Adequate for Discharge  Goal: Maintains/Returns to pre admission functional level  Description: INTERVENTIONS:  - Perform BMAT or MOVE assessment daily    - Set and communicate daily mobility goal to care team and patient/family/caregiver     - Collaborate with rehabilitation services on mobility goals if consulted  - Dangle patient 3 times a day  - Stand patient 6 times a day  - Ambulate patient 4 times a day  - Out of bed to chair 3 times a day   - Out of bed for meals 3 times a day  - Out of bed for toileting  - Record patient progress and toleration of activity level   Outcome: Adequate for Discharge     Problem: DISCHARGE PLANNING  Goal: Discharge to home or other facility with appropriate resources  Description: INTERVENTIONS:  - Identify barriers to discharge w/patient and caregiver  - Arrange for needed discharge resources and transportation as appropriate  - Identify discharge learning needs (meds, wound care, etc )  - Arrange for interpretive services to assist at discharge as needed  - Refer to Case Management Department for coordinating discharge planning if the patient needs post-hospital services based on physician/advanced practitioner order or complex needs related to functional status, cognitive ability, or social support system  Outcome: Adequate for Discharge     Problem: Knowledge Deficit  Goal: Patient/family/caregiver demonstrates understanding of disease process, treatment plan, medications, and discharge instructions  Description: Complete learning assessment and assess knowledge base    Interventions:  - Provide teaching at level of understanding  - Provide teaching via preferred learning methods  Outcome: Adequate for Discharge     Problem: SKIN/TISSUE INTEGRITY - ADULT  Goal: Skin Integrity remains intact(Skin Breakdown Prevention)  Description: Assess:  -Perform Carmelo assessment every shift  -Clean and moisturize skin every day  -Inspect skin when repositioning, toileting, and assisting with ADLS  -Assess under medical devices such as masimo/IV every 2 hours  -Assess extremities for adequate circulation and sensation     Bed Management:  -Have minimal linens on bed & keep smooth, unwrinkled  -Change linens as needed when moist or perspiring  -Avoid sitting or lying in one position for more than 2 hours while in bed  -Keep HOB at 30 degrees     Toileting:  -Offer bedside commode  -Assess for incontinence every hour  -Use incontinent care products after each incontinent episode such as purewick    Activity:  -Encourage activity   -Encourage or provide ROM exercises   -Turn and reposition patient every 2 Hours  -Use appropriate equipment to lift or move patient in bed  -Instruct/ Assist with weight shifting every 2 hours when out of bed in chair  -Consider limitation of chair time 2 hour intervals    Skin Care:  -Avoid use of baby powder, tape, friction and shearing, hot water or constrictive clothing  -Do not massage red bony areas    Next Steps:  -Teach patient strategies to minimize risks such as keeping skin dry   -Consider consults to  interdisciplinary teams such as wound care  Outcome: Adequate for Discharge  Goal: Incision(s), wounds(s) or drain site(s) healing without S/S of infection  Description: INTERVENTIONS  - Assess and document dressing, incision, wound bed, drain sites and surrounding tissue  - Provide patient and family education  - Perform skin care/dressing changes every day   Outcome: Adequate for Discharge  Goal: Pressure injury heals and does not worsen  Description: Interventions:  - Implement low air loss mattress or specialty surface (Criteria met)  - Apply silicone foam dressing  - Instruct/assist with weight shifting every 120 minutes when in chair   - Limit chair time to 2 hour intervals  - Use special pressure reducing interventions such as waffle cushion when in chair   - Apply fecal or urinary incontinence containment device   - Turn and reposition patient & offload bony prominences every 2 hours   - Utilize friction reducing device or surface for transfers   - Consider consults to  interdisciplinary teams such as wound care  - Use incontinent care products after each incontinent episode such as purewick  - Consider nutrition services referral as needed  Outcome: Adequate for Discharge     Problem: MOBILITY - ADULT  Goal: Maintain or return to baseline ADL function  Description: INTERVENTIONS:  - Assess patient's ability to carry out ADLs; assess patient's baseline for ADL function and identify physical deficits which impact ability to perform ADLs (bathing, care of mouth/teeth, toileting, grooming, dressing, etc )  - Assess/evaluate cause of self-care deficits   - Assess range of motion  - Assess patient's mobility; develop plan if impaired  - Assess patient's need for assistive devices and provide as appropriate  - Encourage maximum independence but intervene and supervise when necessary  - Involve family in performance of ADLs  - Assess for home care needs following discharge   - Consider OT consult to assist with ADL evaluation and planning for discharge  - Provide patient education as appropriate  Outcome: Adequate for Discharge  Goal: Maintains/Returns to pre admission functional level  Description: INTERVENTIONS:  - Perform BMAT or MOVE assessment daily    - Set and communicate daily mobility goal to care team and patient/family/caregiver  - Collaborate with rehabilitation services on mobility goals if consulted  - Dangle patient 3 times a day  - Stand patient 6 times a day  - Ambulate patient 4 times a day  - Out of bed to chair 3 times a day   - Out of bed for meals 3 times a day  - Out of bed for toileting  - Record patient progress and toleration of activity level   Outcome: Adequate for Discharge     Problem: Nutrition/Hydration-ADULT  Goal: Nutrient/Hydration intake appropriate for improving, restoring or maintaining nutritional needs  Description: Monitor and assess patient's nutrition/hydration status for malnutrition  Collaborate with interdisciplinary team and initiate plan and interventions as ordered  Monitor patient's weight and dietary intake as ordered or per policy  Utilize nutrition screening tool and intervene as necessary  Determine patient's food preferences and provide high-protein, high-caloric foods as appropriate       INTERVENTIONS:  - Monitor oral intake, urinary output, labs, and treatment plans  - Assess nutrition and hydration status and recommend course of action  - Evaluate amount of meals eaten  - Assist patient with eating if necessary   - Allow adequate time for meals  - Recommend/ encourage appropriate diets, oral nutritional supplements, and vitamin/mineral supplements  - Order, calculate, and assess calorie counts as needed  - Recommend, monitor, and adjust tube feedings and TPN/PPN based on assessed needs  - Assess need for intravenous fluids  - Provide specific nutrition/hydration education as appropriate  - Include patient/family/caregiver in decisions related to nutrition  Outcome: Adequate for Discharge     Problem: Prexisting or High Potential for Compromised Skin Integrity  Goal: Skin integrity is maintained or improved  Description: INTERVENTIONS:  - Identify patients at risk for skin breakdown  - Assess and monitor skin integrity  - Assess and monitor nutrition and hydration status  - Monitor labs   - Assess for incontinence   - Turn and reposition patient  - Assist with mobility/ambulation  - Relieve pressure over bony prominences  - Avoid friction and shearing  - Provide appropriate hygiene as needed including keeping skin clean and dry  - Evaluate need for skin moisturizer/barrier cream  - Collaborate with interdisciplinary team   - Patient/family teaching  - Consider wound care consult   Outcome: Adequate for Discharge

## 2022-12-14 NOTE — ASSESSMENT & PLAN NOTE
· Tender area of erythema and induration to subq area of R abd/pannus  Fortunately patient is afebrile w/o leukocytosis, though has been feeling weak and BG has been higher recently which she was seen at [facility] ED for  · No hx of MRSA, completed 3 days of Vanco  Switch to PO Keflex for 7 total days  · There also may be yeast component in the pannus folds, would start nystatin powder twice daily  · Monitor fever curve and WBC

## 2022-12-14 NOTE — QUICK NOTE
Notified by nursing that patient awoke from sleep yelling out someone's name and when nursing arrived in the patient's room she was reportedly disoriented to place, situation and also reportedly soiled her bed which she has not done previously  Per nursing, patient appeared to be "glazing over " Nursing expressing concern as patient is reportedly completely oriented at baseline  Patient did receive Atarax 50 mg PO and Ativan 0 5 mg PO last evening around 2112, which she has received previously  Vitals obtained and stable  Blood glucose acceptable  On arrival to patient's room, she appears to be in no acute distress, lethargic but becomes more alert with hearing her name called  Able to state that we are in [facility] and states she was told she's in the hospital but is unable to tell me why she is here  Oriented to month but stated incorrect year (2023)  Patient offers no complaints including urinary symptoms  She appears to have shallow breathing but denies SOB  Physical Exam  Vitals reviewed  Constitutional:       General: She is not in acute distress  Appearance: She is obese  Eyes:      Extraocular Movements: Extraocular movements intact  Conjunctiva/sclera: Conjunctivae normal    Pulmonary:      Effort: No respiratory distress  Breath sounds: Decreased breath sounds present  Comments: Shallow breathing  Skin:     General: Skin is warm and dry  Neurological:      General: No focal deficit present  Mental Status: She is lethargic and disoriented  Sensory: No sensory deficit  Motor: No weakness  Comments: Disoriented to situation  Able to follow commands  Tongue midline  Speech intact  Acute encephalopathy- possible related to the medications (Ativan and Atarax) she received a few hours ago although she has received these medications previously  Obtain CT head and repeat lab work, ABG  Monitor neuro checks q4h and notify of any changes   Avoid further sedating medications at this time

## 2022-12-14 NOTE — ASSESSMENT & PLAN NOTE
Lab Results   Component Value Date    HGBA1C 7 0 (H) 11/01/2022       Recent Labs     12/13/22  1525 12/13/22 2041 12/14/22  0059 12/14/22  0803   POCGLU 149* 156* 136 123     · BG has been more elevated likely from infection      · Diabetic diet    Blood Sugar Average: Last 72 hrs:  (P) 348 [id number]

## 2022-12-15 ENCOUNTER — TRANSITIONAL CARE MANAGEMENT (OUTPATIENT)
Dept: FAMILY MEDICINE CLINIC | Facility: CLINIC | Age: 62
End: 2022-12-15

## 2022-12-15 ENCOUNTER — HOME CARE VISIT (OUTPATIENT)
Dept: HOME HEALTH SERVICES | Facility: HOME HEALTHCARE | Age: 62
End: 2022-12-15

## 2022-12-15 VITALS
TEMPERATURE: 97.1 F | HEART RATE: 78 BPM | OXYGEN SATURATION: 99 % | SYSTOLIC BLOOD PRESSURE: 136 MMHG | RESPIRATION RATE: 20 BRPM | DIASTOLIC BLOOD PRESSURE: 64 MMHG

## 2022-12-16 ENCOUNTER — HOME CARE VISIT (OUTPATIENT)
Dept: HOME HEALTH SERVICES | Facility: HOME HEALTHCARE | Age: 62
End: 2022-12-16

## 2022-12-16 VITALS — HEART RATE: 66 BPM | OXYGEN SATURATION: 97 % | DIASTOLIC BLOOD PRESSURE: 66 MMHG | SYSTOLIC BLOOD PRESSURE: 102 MMHG

## 2022-12-16 LAB
BACTERIA BLD CULT: NORMAL
BACTERIA BLD CULT: NORMAL

## 2022-12-17 LAB
ATRIAL RATE: 92 BPM
QRS AXIS: 55 DEGREES
QRSD INTERVAL: 68 MS
QT INTERVAL: 282 MS
QTC INTERVAL: 393 MS
T WAVE AXIS: 77 DEGREES
VENTRICULAR RATE: 117 BPM

## 2022-12-19 ENCOUNTER — HOME CARE VISIT (OUTPATIENT)
Dept: HOME HEALTH SERVICES | Facility: HOME HEALTHCARE | Age: 62
End: 2022-12-19

## 2022-12-19 VITALS
RESPIRATION RATE: 18 BRPM | DIASTOLIC BLOOD PRESSURE: 68 MMHG | SYSTOLIC BLOOD PRESSURE: 110 MMHG | OXYGEN SATURATION: 96 % | HEART RATE: 91 BPM | TEMPERATURE: 98.2 F

## 2022-12-19 DIAGNOSIS — B37.2 SKIN CANDIDIASIS: Primary | ICD-10-CM

## 2022-12-19 RX ORDER — NYSTATIN 100000 U/G
CREAM TOPICAL 2 TIMES DAILY
Qty: 30 G | Refills: 3 | Status: SHIPPED | OUTPATIENT
Start: 2022-12-19

## 2022-12-21 ENCOUNTER — HOME CARE VISIT (OUTPATIENT)
Dept: HOME HEALTH SERVICES | Facility: HOME HEALTHCARE | Age: 62
End: 2022-12-21

## 2022-12-21 VITALS — SYSTOLIC BLOOD PRESSURE: 106 MMHG | DIASTOLIC BLOOD PRESSURE: 64 MMHG

## 2022-12-22 ENCOUNTER — HOME CARE VISIT (OUTPATIENT)
Dept: HOME HEALTH SERVICES | Facility: HOME HEALTHCARE | Age: 62
End: 2022-12-22

## 2022-12-22 VITALS
OXYGEN SATURATION: 95 % | RESPIRATION RATE: 16 BRPM | DIASTOLIC BLOOD PRESSURE: 62 MMHG | SYSTOLIC BLOOD PRESSURE: 110 MMHG | TEMPERATURE: 97.1 F | HEART RATE: 94 BPM

## 2022-12-23 ENCOUNTER — HOME CARE VISIT (OUTPATIENT)
Dept: HOME HEALTH SERVICES | Facility: HOME HEALTHCARE | Age: 62
End: 2022-12-23

## 2022-12-24 VITALS — HEART RATE: 92 BPM | OXYGEN SATURATION: 99 % | SYSTOLIC BLOOD PRESSURE: 118 MMHG | DIASTOLIC BLOOD PRESSURE: 76 MMHG

## 2022-12-27 ENCOUNTER — HOME CARE VISIT (OUTPATIENT)
Dept: HOME HEALTH SERVICES | Facility: HOME HEALTHCARE | Age: 62
End: 2022-12-27

## 2022-12-27 VITALS
DIASTOLIC BLOOD PRESSURE: 70 MMHG | TEMPERATURE: 97.8 F | HEART RATE: 68 BPM | RESPIRATION RATE: 20 BRPM | SYSTOLIC BLOOD PRESSURE: 132 MMHG | OXYGEN SATURATION: 99 %

## 2022-12-28 ENCOUNTER — TELEPHONE (OUTPATIENT)
Dept: FAMILY MEDICINE CLINIC | Facility: CLINIC | Age: 62
End: 2022-12-28

## 2022-12-28 LAB
BASOPHILS # BLD AUTO: 216 CELLS/UL (ref 0–200)
BASOPHILS NFR BLD AUTO: 3 %
BUN SERPL-MCNC: 31 MG/DL (ref 7–25)
BUN/CREAT SERPL: 12 (CALC) (ref 6–22)
CALCIUM SERPL-MCNC: 8.7 MG/DL (ref 8.6–10.4)
CHLORIDE SERPL-SCNC: 101 MMOL/L (ref 98–110)
CO2 SERPL-SCNC: 36 MMOL/L (ref 20–32)
CREAT SERPL-MCNC: 2.49 MG/DL (ref 0.5–1.05)
EOSINOPHIL # BLD AUTO: 72 CELLS/UL (ref 15–500)
EOSINOPHIL NFR BLD AUTO: 1 %
ERYTHROCYTE [DISTWIDTH] IN BLOOD BY AUTOMATED COUNT: 16.4 % (ref 11–15)
GFR/BSA.PRED SERPLBLD CYS-BASED-ARV: 21 ML/MIN/1.73M2
GLUCOSE SERPL-MCNC: 97 MG/DL (ref 65–99)
HCT VFR BLD AUTO: 25.9 % (ref 35–45)
HGB BLD-MCNC: 7.7 G/DL (ref 11.7–15.5)
INR PPP: 1.9
LYMPHOCYTES # BLD MANUAL: 1138 CELLS/UL (ref 850–3900)
LYMPHOCYTES NFR BLD AUTO: 15.8 %
MCH RBC QN AUTO: 28.7 PG (ref 27–33)
MCHC RBC AUTO-ENTMCNC: 29.7 G/DL (ref 32–36)
MCV RBC AUTO: 96.6 FL (ref 80–100)
MONOCYTES # BLD AUTO: 353 CELLS/UL (ref 200–950)
MONOCYTES NFR BLD AUTO: 4.9 %
MYELOCYTES # BLD: 72 CELLS/UL
MYELOCYTES NFR BLD MANUAL: 1 %
NEUTROPHILS # BLD AUTO: 5350 CELLS/UL (ref 1500–7800)
NEUTROPHILS NFR BLD AUTO: 74.3 %
PLATELET # BLD AUTO: 341 THOUSAND/UL (ref 140–400)
PMV BLD REES-ECKER: 10.2 FL (ref 7.5–12.5)
POTASSIUM SERPL-SCNC: 5.1 MMOL/L (ref 3.5–5.3)
PROTHROMBIN TIME: 18.5 SEC (ref 9–11.5)
RBC # BLD AUTO: 2.68 MILLION/UL (ref 3.8–5.1)
SODIUM SERPL-SCNC: 142 MMOL/L (ref 135–146)
WBC # BLD AUTO: 7.2 THOUSAND/UL (ref 3.8–10.8)

## 2022-12-28 NOTE — ED PROVIDER NOTES
History  Chief Complaint   Patient presents with   • Fever - 9 weeks to 76 years     Pt arrives via EMS from home for fevers at high as 101F     Pt is a 60y F hx a fib, HTN, anxiety, hypothyroidism, asthma, hyperlipidemia presenting for weakness  Patient is unable to ooffer a history apart from her being seen yesterday at [facility] who became increasingly tired after she was sent home  She denies recent melena, hematochezia, hemetemesis  Prior to Admission Medications   Prescriptions Last Dose Informant Patient Reported? Taking?    Blood Glucose Monitoring Suppl ([employer] Pro Glucose Meter) SAVANNAH   No Yes   Sig: Use 2 (two) times a day Embrace glucometer, test twice daily   Embrace Lancets Ultra Thin 30G MISC   No Yes   Sig: Use 2 (two) times a day   Guaifenesin 1200 MG TB12   No Yes   Sig: Take 1 tablet (1,200 mg total) by mouth 2 (two) times a day   LORazepam (ATIVAN) 0 5 mg tablet   No Yes   Sig: Take 1 tablet (0 5 mg total) by mouth daily at bedtime   albuterol (2 5 mg/3 mL) 0 083 % nebulizer solution   No Yes   Sig: INHALE CONTENTS OF 1 VIAL ( 3 MILLILITERS ) IN NEBULIZER BY MOUTH AND INTO THE LUNGS EVERY 6 HOURS IF NEEDED FOR WHEEZING OR SHORTNESS OF BREATH   albuterol (PROVENTIL HFA,VENTOLIN HFA) 90 mcg/act inhaler   No Yes   Sig: Inhale 2 puffs every 4 (four) hours as needed for wheezing   amLODIPine (NORVASC) 5 mg tablet   No Yes   Sig: Take 1 tablet (5 mg total) by mouth daily   atorvastatin (LIPITOR) 10 mg tablet   No Yes   Sig: Take 1 tablet (10 mg total) by mouth daily   collagenase (SANTYL) ointment   No Yes   Sig: Apply topically daily   famotidine (PEPCID) 20 mg tablet   No Yes   Sig: Take 1 tablet (20 mg total) by mouth daily   ferrous sulfate 325 (65 Fe) mg tablet   No Yes   Sig: Take 1 tablet (325 mg total) by mouth daily with breakfast   hydrOXYzine HCL (ATARAX) 50 mg tablet   No Yes   Sig: Take 1 tablet (50 mg total) by mouth daily at bedtime   levothyroxine 100 mcg tablet   No Yes   Sig: take 1 tablet by mouth in THE EARLY MORNING   metoprolol tartrate (LOPRESSOR) 50 mg tablet   No Yes   Sig: Take 1 tablet (50 mg total) by mouth 2 (two) times a day   pantoprazole (PROTONIX) 40 mg tablet   No Yes   Sig: Take 1 tablet (40 mg total) by mouth daily   umeclidinium-vilanterol (Anoro Ellipta) 62 5-25 mcg/actuation inhaler   No Yes   Sig: Inhale 1 puff daily   warfarin (COUMADIN) 3 mg tablet   No Yes   Sig: Take 1 tablet (3 mg total) by mouth daily      Facility-Administered Medications: None       Past Medical History:   Diagnosis Date   • Anemia    • Arthritis    • Cancer of kidney (HCC)    • Chronic kidney disease    • Diabetes mellitus (HCC)    • Disease of thyroid gland    • HLD (hyperlipidemia)    • Hypertension    • Ovarian cancer ([facility] [address] )    • Pneumonia        Past Surgical History:   Procedure Laterality Date   • CHOLECYSTECTOMY     • CT GUIDED PERC DRAINAGE CATHETER PLACEMENT  2016   • GALLBLADDER SURGERY  2004   • HYSTERECTOMY  2018   • NEPHRECTOMY Right 2018       Family History   Problem Relation Age of Onset   • No Known Problems Mother    • No Known Problems Father      I have reviewed and agree with the history as documented  E-Cigarette/Vaping   • E-Cigarette Use Never User      E-Cigarette/Vaping Substances   • Nicotine No    • THC Yes    • CBD No    • Flavoring No      Social History     Tobacco Use   • Smoking status: Former     Packs/day: 3 00     Years: 30 00     Pack years: 90 00     Types: Cigarettes     Quit date: 10/22/2010     Years since quittin 1   • Smokeless tobacco: Former     Quit date: 2011   • Tobacco comments:     quit approx   9 years ago   Vaping Use   • Vaping Use: Never used   Substance Use Topics   • Alcohol use: Never     Comment: n/a   • Drug use: Yes     Types: Marijuana     Comment: smokes with girlfriend when anxious        Review of Systems   Unable to perform ROS: Other       Physical Exam  ED Triage Vitals   Temperature Pulse Respirations Blood Pressure SpO2   12/11/22 0112 12/11/22 0112 12/11/22 0112 12/11/22 0112 12/11/22 0112   99 5 °F (37 5 °C) (!) 137 20 94/53 98 %      Temp Source Heart Rate Source Patient Position - Orthostatic VS BP Location FiO2 (%)   12/11/22 0112 12/11/22 0112 12/11/22 0130 12/11/22 0130 --   Oral Monitor Sitting Right arm       Pain Score       12/11/22 0112       3             Orthostatic Vital Signs  Vitals:    12/13/22 2043 12/14/22 0055 12/14/22 0100 12/14/22 0804   BP: 118/61 140/67 140/67 118/64   Pulse: 101  85 93   Patient Position - Orthostatic VS: Lying  Lying        Physical Exam  Vitals and nursing note reviewed  Constitutional:       General: She is not in acute distress  Appearance: She is obese  She is not ill-appearing, toxic-appearing or diaphoretic  HENT:      Head: Normocephalic and atraumatic  Eyes:      General: No scleral icterus  Right eye: No discharge  Left eye: No discharge  Extraocular Movements: Extraocular movements intact  Conjunctiva/sclera: Conjunctivae normal    Cardiovascular:      Rate and Rhythm: Normal rate  Pulses: Normal pulses  Heart sounds: Normal heart sounds  No murmur heard  No friction rub  No gallop  Pulmonary:      Effort: Pulmonary effort is normal  No respiratory distress  Breath sounds: Normal breath sounds  No stridor  No wheezing, rhonchi or rales  Abdominal:      General: Bowel sounds are normal  There is no distension  Palpations: Abdomen is soft  Tenderness: There is no abdominal tenderness  There is no guarding or rebound  Comments: Tenderness to palpation of abdominal pannus  Various skin wounds in various stages of healing consistent with poor hygiene  Musculoskeletal:         General: Normal range of motion  Cervical back: Normal range of motion  No rigidity  Skin:     General: Skin is warm and dry  Capillary Refill: Capillary refill takes less than 2 seconds  Neurological:      General: No focal deficit present  Mental Status: She is alert and oriented to person, place, and time  Mental status is at baseline  Psychiatric:      Comments: When asked questions, patient looks to her partner to give an answer  She is unable to offer a decent history and frequently responds with "I don't know"         ED Medications  Medications   pantoprazole (PROTONIX) injection 40 mg (40 mg Intravenous Given 12/11/22 0250)   vancomycin (VANCOCIN) 2,000 mg in sodium chloride 0 9 % 500 mL IVPB (0 mg Intravenous Stopped 12/11/22 0600)   cefepime (MAXIPIME) 2 g/50 mL dextrose IVPB (0 mg Intravenous Stopped 12/11/22 0331)   heparin (porcine) injection 4,000 Units (4,000 Units Intravenous Given 12/11/22 1146)   vancomycin (VANCOCIN) 1,250 mg in sodium chloride 0 9 % 250 mL IVPB (1,250 mg Intravenous New Bag 12/11/22 2006)   LORazepam (ATIVAN) tablet 0 5 mg (0 5 mg Oral Given 12/13/22 0510)   magnesium sulfate 2 g/50 mL IVPB (premix) 2 g (0 g Intravenous Stopped 12/14/22 0443)       Diagnostic Studies  Results Reviewed     Procedure Component Value Units Date/Time    Blood culture #1 [[id number]] Collected: 12/11/22 0130    Lab Status: Final result Specimen: Blood from Arm, Left Updated: 12/16/22 0701     Blood Culture No Growth After 5 Days  Blood culture #2 [[id number]] Collected: 12/11/22 0130    Lab Status: Final result Specimen: Blood from Hand, Left Updated: 12/16/22 0701     Blood Culture No Growth After 5 Days      Vitamin B12 [[id number]]  (Normal) Collected: 12/11/22 0130    Lab Status: Final result Specimen: Blood from Arm, Left Updated: 12/12/22 1254     Vitamin B-12 897 pg/mL     Folate [[id number]]  (Normal) Collected: 12/11/22 0130    Lab Status: Final result Specimen: Blood from Arm, Left Updated: 12/12/22 1254     Folate 4 5 ng/mL     Lactate dehydrogenase [[id number]]  (Normal) Collected: 12/11/22 0130    Lab Status: Final result Specimen: Blood from Arm, Left Updated: 12/11/22 1127      U/L     HS Troponin I 4hr [[id number]]  (Normal) Collected: 12/11/22 0548    Lab Status: Final result Specimen: Blood from Arm, Left Updated: 12/11/22 0622     hs TnI 4hr 17 ng/L      Delta 4hr hsTnI -4 ng/L     HS Troponin I 2hr [[id number]]  (Normal) Collected: 12/11/22 0331    Lab Status: Final result Specimen: Blood from Arm, Left Updated: 12/11/22 0405     hs TnI 2hr 21 ng/L      Delta 2hr hsTnI 0 ng/L     B-Type Natriuretic Peptide(BNP), AN, CA, EA Campuses Only [[id number]]  (Normal) Collected: 12/11/22 0205    Lab Status: Final result Specimen: Blood from Arm, Left Updated: 12/11/22 0233     BNP 83 pg/mL     Procalcitonin [[id number]]  (Normal) Collected: 12/11/22 0130    Lab Status: Final result Specimen: Blood from Arm, Left Updated: 12/11/22 0221     Procalcitonin 0 23 ng/ml     HS Troponin 0hr (reflex protocol) [[id number]]  (Normal) Collected: 12/11/22 0130    Lab Status: Final result Specimen: Blood from Arm, Left Updated: 12/11/22 0218     hs TnI 0hr 21 ng/L     Lactic acid [[id number]]  (Normal) Collected: 12/11/22 0130    Lab Status: Final result Specimen: Blood from Arm, Left Updated: 12/11/22 0209     LACTIC ACID 1 4 mmol/L     Narrative:      Result may be elevated if tourniquet was used during collection      Comprehensive metabolic panel [[id number]]  (Abnormal) Collected: 12/11/22 0130    Lab Status: Final result Specimen: Blood from Arm, Left Updated: 12/11/22 0209     Sodium 138 mmol/L      Potassium 4 6 mmol/L      Chloride 108 mmol/L      CO2 32 mmol/L      ANION GAP -2 mmol/L      BUN 22 mg/dL      Creatinine 2 19 mg/dL      Glucose 188 mg/dL      Calcium 8 3 mg/dL      Corrected Calcium 9 5 mg/dL      AST 13 U/L      ALT 11 U/L      Alkaline Phosphatase 112 U/L      Total Protein 5 6 g/dL      Albumin 2 5 g/dL      Total Bilirubin 0 32 mg/dL      eGFR 23 ml/min/1 73sq m     Narrative:      [place] guidelines for Chronic Kidney Disease (CKD): •  Stage 1 with normal or high GFR (GFR > 90 mL/min/1 73 square meters)  •  Stage 2 Mild CKD (GFR = 60-89 mL/min/1 73 square meters)  •  Stage 3A Moderate CKD (GFR = 45-59 mL/min/1 73 square meters)  •  Stage 3B Moderate CKD (GFR = 30-44 mL/min/1 73 square meters)  •  Stage 4 Severe CKD (GFR = 15-29 mL/min/1 73 square meters)  •  Stage 5 End Stage CKD (GFR <15 mL/min/1 73 square meters)  Note: GFR calculation is accurate only with a steady state creatinine    Magnesium [[id number]]  (Normal) Collected: 12/11/22 0130    Lab Status: Final result Specimen: Blood from Arm, Left Updated: 12/11/22 0209     Magnesium 2 0 mg/dL     Protime-INR [[id number]]  (Abnormal) Collected: 12/11/22 0130    Lab Status: Final result Specimen: Blood from Arm, Left Updated: 12/11/22 0207     Protime 20 9 seconds      INR 1 77    APTT [[id number]]  (Abnormal) Collected: 12/11/22 0130    Lab Status: Final result Specimen: Blood from Arm, Left Updated: 12/11/22 0207     PTT 41 seconds     D-dimer, quantitative [[id number]]  (Abnormal) Collected: 12/11/22 0130    Lab Status: Final result Specimen: Blood from Arm, Left Updated: 12/11/22 0207     D-Dimer, Quant 0 52 ug/ml FEU     Narrative: In the evaluation for possible pulmonary embolism, in the appropriate (Well's Score of 4 or less) patient, the age adjusted d-dimer cutoff for this patient can be calculated as:    Age x 0 01 (in ug/mL) for Age-adjusted D-dimer exclusion threshold for a patient over 50 years      CBC and differential [[id number]]  (Abnormal) Collected: 12/11/22 0130    Lab Status: Final result Specimen: Blood from Arm, Left Updated: 12/11/22 0203     WBC 4 35 Thousand/uL      RBC 2 20 Million/uL      Hemoglobin 6 3 g/dL      Hematocrit 22 6 %       fL      MCH 28 6 pg      MCHC 27 9 g/dL      RDW 19 2 %      MPV 10 3 fL      Platelets 447 Thousands/uL      nRBC 1 /100 WBCs      Neutrophils Relative 67 %      Immat GRANS % 1 %      Lymphocytes Relative 17 % Monocytes Relative 11 %      Eosinophils Relative 4 %      Basophils Relative 0 %      Neutrophils Absolute 2 91 Thousands/µL      Immature Grans Absolute 0 03 Thousand/uL      Lymphocytes Absolute 0 72 Thousands/µL      Monocytes Absolute 0 49 Thousand/µL      Eosinophils Absolute 0 19 Thousand/µL      Basophils Absolute 0 01 Thousands/µL     Narrative: This is an appended report  These results have been appended to a previously verified report  Blood gas, venous [[id number]]  (Abnormal) Collected: 12/11/22 0130    Lab Status: Final result Specimen: Blood from Arm, Left Updated: 12/11/22 0150     pH, Shayan 7 324     pCO2, Shayan 65 6 mm Hg      pO2, Shayan 69 7 mm Hg      HCO3, Shayan 33 3 mmol/L      Base Excess, Shayan 6 5 mmol/L      O2 Content, Shayan 9 3 ml/dL      O2 HGB, VENOUS 91 1 %                  CT head wo contrast   Final Result by Kim Alvarado MD (12/14 0808)      No acute intracranial abnormality  Workstation performed: [id number]         CT chest abdomen pelvis wo contrast   Final Result by Jeimy Pastor DO (12/11 0360)      Inflammatory changes overlying the right ventral abdominal wall hernia suggestive of infection such as cellulitis                  Workstation performed: [id number]         XR chest 1 view portable   Final Result by Casie Morelos MD (12/11 1000)      Mild bibasilar atelectasis  Workstation performed: [id number]               Procedures  Procedures      ED Course                          Initial Sepsis Screening     Row Name 12/11/22 8575                Is the patient's history suggestive of a new or worsening infection? Yes (Proceed)  -SC        Suspected source of infection soft tissue  -SC        Are two or more of the following signs & symptoms of infection both present and new to the patient?  Yes (Proceed)  -SC        Indicate SIRS criteria Tachycardia > 90 bpm;Leukopenia (WBC < 4000 IJL)  -SC        If the answer is yes to both questions, suspicion of sepsis is present --        If severe sepsis is present AND tissue hypoperfusion perists in the hour after fluid resuscitation or lactate > 4, the patient meets criteria for SEPTIC SHOCK --        Are any of the following organ dysfunction criteria present within 6 hours of suspected infection and SIRS criteria that are NOT considered to be chronic conditions? No  -SC        Organ dysfunction SBP < 90 mmHg  -SC        Date of presentation of severe sepsis --        Time of presentation of severe sepsis --        Tissue hypoperfusion persists in the hour after crystalloid fluid administration, evidenced, by either: --        Was hypotension present within one hour of the conclusion of crystalloid fluid administration? --        Date of presentation of septic shock --        Time of presentation of septic shock --              User Key  (r) = Recorded By, (t) = Taken By, (c) = Cosigned By    [facility] [address] Name Provider Type    SC Sarah Campbell MD Physician                SBIRT 22yo+    Flowsheet Row Most Recent Value   SBIRT (25 yo +)    In order to provide better care to our patients, we are screening all of our patients for alcohol and drug use  Would it be okay to ask you these screening questions? Unable to answer at this time Filed at: 12/11/2022 0131                [facility]  Number of Diagnoses or Management Options  Anemia: new and requires workup  Cellulitis: new and requires workup  Diagnosis management comments: -Patient presented to emergency department for evaluation  -Unable to offer distant history  -While in emergency department patient pressure became very labile  Patient did require 1 L fluids    Patient noted to be anemic with hemoglobin 6 3, received 1 unit packed red blood cells in emergency department  -CT demonstrates cellulitis overlying ventral hernia, will admit for further evaluation       Amount and/or Complexity of Data Reviewed  Clinical lab tests: ordered and reviewed  Tests in the radiology section of CPT®: ordered and reviewed  Tests in the medicine section of CPT®: ordered and reviewed  Decide to obtain previous medical records or to obtain history from someone other than the patient: yes  Review and summarize past medical records: yes  Discuss the patient with other providers: yes  Independent visualization of images, tracings, or specimens: yes        Disposition  Final diagnoses:   Anemia   Cellulitis     Time reflects when diagnosis was documented in both MDM as applicable and the Disposition within this note     Time User Action Codes Description Comment    12/11/2022  7:32 AM Norva Shackle Add [D64 9] Anemia     12/11/2022  7:32 AM Norva Shackle Add [L03 90] Cellulitis     12/11/2022 10:40 AM Cardio, Ming Osgood Add [Y02 246] Cellulitis of abdominal wall     12/11/2022 11:22 AM Cardio, Ming Osgood Add [E66 01,  Z68 43] Morbid obesity with BMI of 50 0-59 9, adult ([facility] [address] )     12/14/2022  8:28 AM Rosalene Iba Add [L03 311] Abdominal wall cellulitis     12/14/2022  8:28 AM Rosalene Iba Add [R06 02] SOB (shortness of breath)       ED Disposition     ED Disposition   Admit    Condition   Stable    Date/Time   Sun Dec 11, 2022 0732    Comment   Case was discussed with adán and the patient's admission status was agreed to be Admission Status: inpatient status to the service of Dr Rebeka Hobson   Follow-up Information     Follow up With Specialties Details Why [address] [facility]  Follow up Home health agency will be in touch for continuation of care   [address] [place] [id number]  [phone]            Discharge Medication List as of 12/14/2022 10:53 AM      START taking these medications    Details   cephalexin (KEFLEX) 500 mg capsule Take 1 capsule (500 mg total) by mouth every 6 (six) hours for 4 days, Starting Wed 12/14/2022, Until Sun 12/18/2022, Normal      furosemide (LASIX) 40 mg tablet Take 1 tablet (40 mg total) by mouth daily, Starting Wed 12/14/2022, Until Tue 3/14/2023, Normal         CONTINUE these medications which have NOT CHANGED    Details   albuterol (2 5 mg/3 mL) 0 083 % nebulizer solution INHALE CONTENTS OF 1 VIAL ( 3 MILLILITERS ) IN NEBULIZER BY MOUTH AND INTO THE LUNGS EVERY 6 HOURS IF NEEDED FOR WHEEZING OR SHORTNESS OF BREATH, Normal      albuterol (PROVENTIL HFA,VENTOLIN HFA) 90 mcg/act inhaler Inhale 2 puffs every 4 (four) hours as needed for wheezing, Starting Wed 10/26/2022, Until Tue 1/24/2023 at 2359, Normal      amLODIPine (NORVASC) 5 mg tablet Take 1 tablet (5 mg total) by mouth daily, Starting Wed 10/26/2022, Until Tue 1/24/2023, Normal      atorvastatin (LIPITOR) 10 mg tablet Take 1 tablet (10 mg total) by mouth daily, Starting Wed 10/26/2022, Normal      Blood Glucose Monitoring Suppl ([employer] Pro Glucose Meter) SAVANNAH Use 2 (two) times a day [employer] glucometer, test twice daily, Starting Tue 11/30/2021, Normal      collagenase (SANTYL) ointment Apply topically daily, Starting Wed 10/26/2022, Normal      Embrace Lancets Ultra Thin 30G MISC Use 2 (two) times a day, Starting Mon 8/23/2021, Normal      famotidine (PEPCID) 20 mg tablet Take 1 tablet (20 mg total) by mouth daily, Starting Wed 10/26/2022, Normal      ferrous sulfate 325 (65 Fe) mg tablet Take 1 tablet (325 mg total) by mouth daily with breakfast, Starting Fri 12/9/2022, Normal      Guaifenesin 1200 MG TB12 Take 1 tablet (1,200 mg total) by mouth 2 (two) times a day, Starting Thu 12/8/2022, Until Sat 1/7/2023, Normal      hydrOXYzine HCL (ATARAX) 50 mg tablet Take 1 tablet (50 mg total) by mouth daily at bedtime, Starting Thu 12/8/2022, Normal      levothyroxine 100 mcg tablet take 1 tablet by mouth in THE EARLY MORNING, Normal      LORazepam (ATIVAN) 0 5 mg tablet Take 1 tablet (0 5 mg total) by mouth daily at bedtime, Starting Wed 7/27/2022, Normal      metoprolol tartrate (LOPRESSOR) 50 mg tablet Take 1 tablet (50 mg total) by mouth 2 (two) times a day, Starting Wed 10/26/2022, Normal      pantoprazole (PROTONIX) 40 mg tablet Take 1 tablet (40 mg total) by mouth daily, Starting Thu 12/8/2022, Until Wed 3/8/2023, Normal      umeclidinium-vilanterol (Anoro Ellipta) 62 5-25 mcg/actuation inhaler Inhale 1 puff daily, Starting Thu 12/8/2022, Until Sat 1/7/2023, Normal      warfarin (COUMADIN) 3 mg tablet Take 1 tablet (3 mg total) by mouth daily, Starting Wed 10/26/2022, Normal               PDMP Review       Value Time User    PDMP Reviewed  Yes 12/13/2022  4:36 AM Gaye Delarosa           ED Provider  Attending physically available and evaluated Jersey Rodriges  CHUCHO managed the patient along with the ED Attending      Electronically Signed by         Jarrett Agustin DO  12/30/22 5888

## 2022-12-29 ENCOUNTER — ANTICOAG VISIT (OUTPATIENT)
Dept: FAMILY MEDICINE CLINIC | Facility: CLINIC | Age: 62
End: 2022-12-29

## 2022-12-29 ENCOUNTER — HOME CARE VISIT (OUTPATIENT)
Dept: HOME HEALTH SERVICES | Facility: HOME HEALTHCARE | Age: 62
End: 2022-12-29

## 2022-12-29 VITALS — DIASTOLIC BLOOD PRESSURE: 59 MMHG | SYSTOLIC BLOOD PRESSURE: 111 MMHG

## 2022-12-30 ENCOUNTER — HOME CARE VISIT (OUTPATIENT)
Dept: HOME HEALTH SERVICES | Facility: HOME HEALTHCARE | Age: 62
End: 2022-12-30

## 2022-12-30 VITALS
HEART RATE: 92 BPM | SYSTOLIC BLOOD PRESSURE: 130 MMHG | DIASTOLIC BLOOD PRESSURE: 68 MMHG | OXYGEN SATURATION: 99 % | TEMPERATURE: 96.3 F | RESPIRATION RATE: 20 BRPM

## 2022-12-31 ENCOUNTER — HOME CARE VISIT (OUTPATIENT)
Dept: HOME HEALTH SERVICES | Facility: HOME HEALTHCARE | Age: 62
End: 2022-12-31

## 2022-12-31 NOTE — CASE COMMUNICATION
Plan PT revisit on 1/5/23 and discharge  First SN visit of week please have patient sign [address]  Unsure last visit of week for patient by SN  Please consult with

## 2023-01-02 DIAGNOSIS — D62 ACUTE BLOOD LOSS ANEMIA: Primary | ICD-10-CM

## 2023-01-02 DIAGNOSIS — I48.0 PAROXYSMAL ATRIAL FIBRILLATION (HCC): ICD-10-CM

## 2023-01-03 ENCOUNTER — HOME CARE VISIT (OUTPATIENT)
Dept: HOME HEALTH SERVICES | Facility: HOME HEALTHCARE | Age: 63
End: 2023-01-03

## 2023-01-03 VITALS
RESPIRATION RATE: 16 BRPM | TEMPERATURE: 97.6 F | OXYGEN SATURATION: 99 % | SYSTOLIC BLOOD PRESSURE: 126 MMHG | DIASTOLIC BLOOD PRESSURE: 65 MMHG | HEART RATE: 87 BPM

## 2023-01-05 ENCOUNTER — HOME CARE VISIT (OUTPATIENT)
Dept: HOME HEALTH SERVICES | Facility: HOME HEALTHCARE | Age: 63
End: 2023-01-05

## 2023-01-05 VITALS — OXYGEN SATURATION: 97 % | HEART RATE: 96 BPM

## 2023-01-06 ENCOUNTER — TELEPHONE (OUTPATIENT)
Dept: FAMILY MEDICINE CLINIC | Facility: CLINIC | Age: 63
End: 2023-01-06

## 2023-01-06 ENCOUNTER — ANTICOAG VISIT (OUTPATIENT)
Dept: FAMILY MEDICINE CLINIC | Facility: CLINIC | Age: 63
End: 2023-01-06

## 2023-01-06 LAB
INR PPP: 2.4
PROTHROMBIN TIME: 22.6 SEC (ref 9–11.5)

## 2023-01-06 NOTE — PROGRESS NOTES
Called pt and caretaker answered phone  Informed that INR was 2 4 and that INR must be rechecked in 3 weeks as per PCP's note - caretaker acknowledged       INR calender was updated

## 2023-01-06 NOTE — TELEPHONE ENCOUNTER
----- Message from Yoav Winters MD sent at 1/6/2023 11:34 AM EST -----  Please call the patient regarding her abnormal result  INR 2 4  Continue taking same Coumadin dose    Recheck INR 3 weeks

## 2023-01-10 ENCOUNTER — TELEPHONE (OUTPATIENT)
Dept: OTHER | Facility: OTHER | Age: 63
End: 2023-01-10

## 2023-01-10 LAB
BASOPHILS # BLD AUTO: 29 CELLS/UL (ref 0–200)
BASOPHILS NFR BLD AUTO: 0.5 %
EOSINOPHIL # BLD AUTO: 143 CELLS/UL (ref 15–500)
EOSINOPHIL NFR BLD AUTO: 2.5 %
ERYTHROCYTE [DISTWIDTH] IN BLOOD BY AUTOMATED COUNT: 16.5 % (ref 11–15)
HCT VFR BLD AUTO: 27.6 % (ref 35–45)
HGB BLD-MCNC: 8.1 G/DL (ref 11.7–15.5)
LYMPHOCYTES # BLD AUTO: 821 CELLS/UL (ref 850–3900)
LYMPHOCYTES NFR BLD AUTO: 14.4 %
MCH RBC QN AUTO: 29.1 PG (ref 27–33)
MCHC RBC AUTO-ENTMCNC: 29.3 G/DL (ref 32–36)
MCV RBC AUTO: 99.3 FL (ref 80–100)
MONOCYTES # BLD AUTO: 553 CELLS/UL (ref 200–950)
MONOCYTES NFR BLD AUTO: 9.7 %
NEUTROPHILS # BLD AUTO: 4155 CELLS/UL (ref 1500–7800)
NEUTROPHILS NFR BLD AUTO: 72.9 %
PLATELET # BLD AUTO: 254 THOUSAND/UL (ref 140–400)
PMV BLD REES-ECKER: 10.4 FL (ref 7.5–12.5)
RBC # BLD AUTO: 2.78 MILLION/UL (ref 3.8–5.1)
WBC # BLD AUTO: 5.7 THOUSAND/UL (ref 3.8–10.8)

## 2023-01-10 NOTE — TELEPHONE ENCOUNTER
Gaby stack from [employer] would like to know if she needs to complete CBC now or wait til she repeats INR  in 3 weeks  she can be reached at  [phone]

## 2023-01-11 NOTE — TELEPHONE ENCOUNTER
Called Gaby twice from [facility] to let her know Rosendoyajaira Prado stated to complete CBS now and went straight to voicemail, couldn't leave a message stated was unavailable

## 2023-01-24 PROBLEM — J18.9 PNEUMONIA DUE TO INFECTIOUS ORGANISM: Status: RESOLVED | Noted: 2022-11-16 | Resolved: 2023-01-24

## 2023-02-08 PROBLEM — N18.4 ANEMIA IN STAGE 4 CHRONIC KIDNEY DISEASE (HCC): Status: ACTIVE | Noted: 2023-02-08

## 2023-02-08 PROBLEM — N18.30 CHRONIC KIDNEY DISEASE, STAGE III (MODERATE) (HCC): Status: RESOLVED | Noted: 2018-10-04 | Resolved: 2023-02-08

## 2023-02-08 PROBLEM — D63.1 ANEMIA IN STAGE 4 CHRONIC KIDNEY DISEASE (HCC): Status: ACTIVE | Noted: 2023-02-08

## 2023-02-08 PROBLEM — D63.1 ANEMIA DUE TO STAGE 3B CHRONIC KIDNEY DISEASE (HCC): Status: RESOLVED | Noted: 2018-10-04 | Resolved: 2023-02-08

## 2023-02-08 PROBLEM — N18.32 ANEMIA DUE TO STAGE 3B CHRONIC KIDNEY DISEASE (HCC): Status: RESOLVED | Noted: 2018-10-04 | Resolved: 2023-02-08

## 2023-02-08 NOTE — PROGRESS NOTES
RENAL FOLLOW UP NOTE: td    • ASSESSMENT AND PLAN:  1  Yisel Frey KABWW 6 :  • Etiology:  Right radical nephrectomy July 15, 2016 for renal cell CA/hypertensive nephrosclerosis/arteriolar nephrosclerosis/diabetic nephropathy/?  Morbid obesity with?  FSGS  • Baseline creatinine:  new baseline appears to be from 1 8-as high as 2 4  • Current creatinine:  1 79 at baseline  • Urine protein creatinine ratio:  0 688 g at goal  • UA microscopic no proteinuria, trace intact heme from 05/13/2020  Recommendations:  • Treat hypertension-please see below  • Treat dyslipidemia-please see below  • Maintain proteinuria less than 1 g or as low as possible  • Avoid nephrotoxic agents such as NSAIDs, patient counseled as such    2   Volume:  • Current volume:  Euvolemic  • Treatment:  • Continue current Lasix 40 mg daily with an added dose as needed  • Check chest x-ray as outlined below although the lung exam most likely related to atelectasis: Lower extremity edema from lymphedema    3   Hypertension:   Home blood pressures:    P m :  134/67  A  m :   133/80  Heart rate:     • Goal blood pressure:  Less than 130/80  Recommendations:  •   push nonmedical regimen including weight loss, and any form of exercise patient can not tolerate; avoidance of salt; patient counseled as such  • Medication changes today:  • Not sure of the accuracy of her blood pressure machine I would favor to avoid hypotension of decreased amlodipine just half or 2 5 mg in the morning, Continue furosemide 40 mg and the metoprolol  • Check blood pressures in about 4 to 6 weeks    4   Electrolytes:  Chronic metabolic alkalosis most likely from primary CO2 retention, stable at 36    5   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:  • Calcium/magnesium/phosphorus:  All acceptable  • PTH intact:  73 at goal  • Vitamin-D:  47 at goal    6   Dyslipidemia:  • Goal LDL:  Less than 100  • Current lipid profile: LDL 41/HDL 32/triglycerides 97  Recommendations: No changes as patient is at goal    7   Anemia:    -Current value 9 0 slightly better than prior  -iron studies: Saturation 11%/ferritin 23 replete/consider intravenous iron infusion: Discussed with the patient she would strongly prefer iron pills she has difficulty getting to the hospital at times  -B12 and folate deficiency being treated  -multiple myeloma was ruled out in January  -GI evaluation in January of 2018:   colonoscopy demonstrated internal/external hemorrhoids; also colonic polyps which were removed  Juan Carlos Hands was a suboptimal colonic prep   Felt she may require repeat colonoscopy in 3-6 months  EGD demonstrated small hiatal hernia  · GI evaluated the patient December 2022 because of anemia EGD demonstrated gastric duodenal AVMs requiring APC, follow-up secondary endoscopy because of dropping hemoglobin; colonoscopy demonstrated no active bleeding  · Capsule endoscopy was recommended         8   Other problems:  • Right radical nephrectomy for renal cell CA July 15, 2016  • 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 2, 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  • Morbid obesity  • Atrial fibrillation  • Cellulitis of the left leg back in January treated with intravenous antibiotics  • COPD on iron  • EGD involving multiple joints  • Hypothyroidism on supplement  • History of gait dysfunction using motorized scooter to ambulate  • Diabetes mellitus/diabetic neuropathy  • Admission 8/2021 for abdominal pain-workup is CT/EGD/colonoscopy/push enteroscopy all negative except for gastritis-esophagitis; the patient's symptoms resolved spontaneously  Patient with a ventral hernia which she deferred in terms of surgical revision, will follow-up with surgery    • Admission to [facility]11/2022 with abdominal wall cellulitis/anemia status posttransfusion  • Colonoscopy 11/30/2022 demonstrated diverticula in the sigmoid colon otherwise normal colonoscopy  • EGD with push enteroscopy was prematurely aborted secondary respiratory instability  • Completed course of antibiotics including Keflex  • Peak creatinine 2 19 on admission came down to 1 85  · Admission December 3, 2022 with acute hypoxic hypercapnic respiratory failure: Felt to have multifocal pneumonia started on empiric antibiotics treated by pulmonary  Also GI evaluation for anemia please see above  · Slight crackles at the left greater than right base I will send her for chest x-ray most likely related to the atelectasis seen       GI health maintenance:  Please see above under other problems  PATIENT INSTRUCTIONS:    Patient Instructions   1  Medication changes today:  • Please decrease amlodipine to half a pill or 2 5 mg daily if you have difficulty cutting the pill in half please let me know  This is because of the low blood pressure  • Please avoid salt is much as possible    Please go for a chest x-ray at your convenience at this time to make sure that your lungs are clear of fluid    Please follow-up with the gastroenterology team for that procedure they recommended which was the capsule endoscopy when you can exclamation    2  Please go for a nonfasting lab in about 6 weeks to recheck your blood count/hemoglobin    3    Please take 1 week a blood pressure readings in about 4 weeks after making the above medication change    AS FOLLOWS  MORNING AND EVENING, SITTING AND STANDING as follows:  · TAKE THE MORNING READINGS BEFORE ANY MEDICATIONS AND WHEN YOU ARE RELAXED FOR SEVERAL MINUTES  · TAKE THE EVENING READINGS:  BETWEEN 7-10 P M ; PRIOR TO ANY MEDICATIONS; AT LEAST IN OUR  FROM DINNER; AND CERTAINLY AFTER RELAXING FOR A FEW MINUTES  · PLEASE INCLUDE HEART RATE WITH YOUR BLOOD PRESSURE READINGS  · When taking standing readings, keep your arm supported at heart level and not dangling  · Make sure you are sitting with your back supported and feet on the ground and do not cross your legs or feet  · Make sure you have not taken any coffee or caffeine products or exercised or smoke cigarettes at least 30 minutes before taking your blood pressure  Then please mail these readings into the office    4  Follow-up in 4 months  • Please bring in 1 week a blood pressure readings morning evening, sitting and standing is outlined above  • PLEASE BRING AN YOUR BLOOD PRESSURE MACHINE TO CORRELATE WITH THE OFFICE MACHINE AT THIS NEXT SCHEDULED VISIT  • Please go for fasting lab work 1-2 weeks prior to your appointment      5  General instructions:  • AVOID SALT BUT NOT ADDING AN READING LABELS TO MAKE SURE THERE IS LOW-SALT IN THE FOOD THAT YOU ARE EATING  o Goal is less than 2 g of sodium intake or less than 5 g of sodium chloride intake per day    • Avoid nonsteroidal anti-inflammatory drugs such as Naprosyn, ibuprofen, Aleve, Advil, Celebrex, Meloxicam (Mobic) etc   You can use Tylenol as needed if you do not have any liver condition to be concerned about    • Avoid medications such as Sudafed or decongestants and antihistamines that contained the D component which is the decongestant  You can take antihistamines without the decongestant or D component  • Try to avoid medications such as pantoprazole or  Protonix/Nexium or Esomeprazole)/Prilosec or omeprazole/Prevacid or lansoprazole/AcipHex or Rabeprazole  If you are able to, use Pepcid as this is safer for your kidneys  • Try to exercise at least 30 minutes 3 days a week to begin with with an ultimate goal of 5 days a week for at least 30 minutes    • Try to lose 5-10 lb by your next visit    • Please do not drink more than 2 glasses of alcohol/wine on a daily basis as this may contribute to your high blood pressure  Subjective: The patient was admitted to the hospital please see above  Overall breathing better since hospitalization feels some shortness of breath on continuous oxygen  No chest pain,, there is no significant swelling  No fevers, chills, or cough or colds    Fair appetite and fair  energy  No hematuria, dysuria, voiding symptoms or foamy urine  Patient with chronic nausea but no vomiting, no diarrhea no abdominal pain and no bright blood per rectum and no tarry stools  No headaches, dizziness or lightheadedness  Blood pressure medications:  • Lopressor 50 mg twice a day  • Lasix 40 mg every other day and as needed dose  • Zotepine 5 mg daily in the morning      ROS:  See HPI, otherwise review of systems as completely reviewed with the patient are negative    Past Medical History:   Diagnosis Date   • Anemia    • Arthritis    • [facility] )    • Chronic kidney disease    • Diabetes mellitus ([facility] )    • Disease of thyroid gland    • HLD (hyperlipidemia)    • Hypertension    • Ovarian cancer ([facility] )    • Pneumonia      Past Surgical History:   Procedure Laterality Date   • CHOLECYSTECTOMY     • CT GUIDED PERC DRAINAGE CATHETER PLACEMENT  5/16/2016   • GALLBLADDER SURGERY  05/01/2004   • HYSTERECTOMY  06/01/2018   • NEPHRECTOMY Right 08/02/2018     Family History   Problem Relation Age of Onset   • No Known Problems Mother    • No Known Problems Father       reports that she quit smoking about 12 years ago  Her smoking use included cigarettes  She has a 90 00 pack-year smoking history  She quit smokeless tobacco use about 11 years ago  She reports current drug use  Drug: Marijuana  She reports that she does not drink alcohol  I COMPLETELY REVIEWED THE PAST MEDICAL HISTORY/PAST SURGICAL HISTORY/SOCIAL HISTORY/FAMILY HISTORY/AND MEDICATIONS  AND UPDATED ALL    Objective:     Vitals:   BP sitting on left forearm: 94/70 with a heart rate of 72 and regular    Weight (last 2 days)     Date/Time Weight    02/16/23 1026 153 (338)     Weight: PATIENT REPORTED at 02/16/23 1026        Wt Readings from Last 3 Encounters:   02/16/23 (!) 153 kg (338 lb)   02/15/23 (!) 193 kg (425 lb)   12/11/22 (!) 147 kg (324 lb)       Body mass index is 58 02 kg/m²      Physical Exam: General:  No acute distress/morbidly obese in wheelchair  Skin:  No acute rash  Eyes:  No scleral icterus, noninjected, no discharge from eyes  ENT:  Moist mucous membranes  Neck:  Supple, no jugular venous distention, trachea is midline, no lymphadenopathy and no thyromegaly  Back   No CVAT  Chest:  Clear to auscultation and percussion, good respiratory effort  CVS:  Regular rate and rhythm without a rub, or gallops or murmurs  Abdomen:   Morbidly obese/large pannus,Soft and nontender with normal bowel sounds  Extremities:  No cyanosis and nonpitting lymphedema two thirds of the way up the pretibial region, left leg is wrapped, no arthritic changes, normal range of motion  Neuro:  Grossly intact  Psych:  Alert, oriented x3 and appropriate      Medications:    Current Outpatient Medications:   •  albuterol (2 5 mg/3 mL) 0 083 % nebulizer solution, INHALE CONTENTS OF 1 VIAL ( 3 MILLILITERS ) IN NEBULIZER BY MOUTH AND INTO THE LUNGS EVERY 6 HOURS IF NEEDED FOR WHEEZING OR SHORTNESS OF BREATH, Disp: 75 mL, Rfl: 5  •  albuterol (PROVENTIL HFA,VENTOLIN HFA) 90 mcg/act inhaler, inhale 2 puffs every 4 hours if needed for wheezing, Disp: , Rfl:   •  amLODIPine (NORVASC) 5 mg tablet, Take 1 tablet (5 mg total) by mouth daily, Disp: 90 tablet, Rfl: 2  •  atorvastatin (LIPITOR) 10 mg tablet, Take 1 tablet (10 mg total) by mouth daily, Disp: 90 tablet, Rfl: 3  •  Blood Glucose Monitoring Suppl (CARD.comace Pro Glucose Meter) SAVANNAH, Use 2 (two) times a day Embrace glucometer, test twice daily, Disp: 1 each, Rfl: 0  •  collagenase (SANTYL) ointment, Apply topically daily, Disp: 15 g, Rfl: 1  •  Embrace Lancets Ultra Thin 30G MISC, Use 2 (two) times a day, Disp: 100 each, Rfl: 5  •  famotidine (PEPCID) 20 mg tablet, Take 1 tablet (20 mg total) by mouth daily, Disp: 90 tablet, Rfl: 3  •  ferrous sulfate 325 (65 Fe) mg tablet, Take 1 tablet (325 mg total) by mouth daily with breakfast, Disp: 90 tablet, Rfl: 3  •  furosemide (LASIX) 40 mg tablet, Take 1 tablet (40 mg total) by mouth daily (Patient taking differently: Take 40 mg by mouth every other day And and as needed dose), Disp: 90 tablet, Rfl: 3  •  hydrOXYzine HCL (ATARAX) 50 mg tablet, Take 1 tablet (50 mg total) by mouth daily at bedtime, Disp: 30 tablet, Rfl: 12  •  levothyroxine 100 mcg tablet, Take 1 tablet (100 mcg total) by mouth daily, Disp: 90 tablet, Rfl: 3  •  LORazepam (ATIVAN) 0 5 mg tablet, Take 1 tablet (0 5 mg total) by mouth 2 (two) times a day as needed for anxiety, Disp: 60 tablet, Rfl: 4  •  metoprolol tartrate (LOPRESSOR) 50 mg tablet, Take 1 tablet (50 mg total) by mouth 2 (two) times a day, Disp: 180 tablet, Rfl: 3  •  nystatin (MYCOSTATIN) cream, Apply topically 2 (two) times a day, Disp: 30 g, Rfl: 3  •  pantoprazole (PROTONIX) 40 mg tablet, Take 1 tablet (40 mg total) by mouth daily, Disp: 90 tablet, Rfl: 1  •  sertraline (Zoloft) 50 mg tablet, Take 1 tablet (50 mg total) by mouth daily, Disp: 30 tablet, Rfl: 5  •  warfarin (COUMADIN) 3 mg tablet, Take 1 tablet (3 mg total) by mouth daily, Disp: 90 tablet, Rfl: 3  •  umeclidinium-vilanterol (Anoro Ellipta) 62 5-25 mcg/actuation inhaler, Inhale 1 puff daily (Patient not taking: Reported on 2/16/2023), Disp: 60 blister, Rfl: 5    Lab, Imaging and other studies: I have personally reviewed pertinent labs  Laboratory Results:  Results for orders placed or performed in visit on 02/13/23   PTH, Intact and Calcium   Result Value Ref Range    PTH, Intact 93 (H) 16 - 77 pg/mL    Calcium 8 8 8 6 - 10 4 mg/dL       Results from last 7 days   Lab Units 02/13/23  0940   WHITE BLOOD CELL COUNT  Thousand/uL 5 3   HEMOGLOBIN  g/dL 9 0*   HEMATOCRIT  % 30 1*   PLATELETS   Thousand/uL 290   POTASSIUM mmol/L 4 3   CHLORIDE mmol/L 101   CO2 mmol/L 36*   BUN mg/dL 12   CREATININE mg/dL 1 79*   CALCIUM mg/dL 8 8  9 1   MAGNESIUM mg/dL 1 9         Radiology review:   chest X-ray    Ultrasound      Portions of the record may have been created with voice recognition software  Occasional wrong word or "sound a like" substitutions may have occurred due to the inherent limitations of voice recognition software  Read the chart carefully and recognize, using context, where substitutions have occurred

## 2023-02-13 DIAGNOSIS — F41.0 PANIC ATTACK: ICD-10-CM

## 2023-02-13 RX ORDER — LORAZEPAM 0.5 MG/1
TABLET ORAL
Qty: 30 TABLET | Refills: 0 | Status: SHIPPED | OUTPATIENT
Start: 2023-02-13 | End: 2023-02-15 | Stop reason: SDUPTHER

## 2023-02-14 LAB
ALBUMIN SERPL-MCNC: 3.2 G/DL (ref 3.6–5.1)
ALBUMIN/GLOB SERPL: 0.8 (CALC) (ref 1–2.5)
ALP SERPL-CCNC: 138 U/L (ref 37–153)
ALT SERPL-CCNC: 5 U/L (ref 6–29)
AST SERPL-CCNC: 14 U/L (ref 10–35)
BILIRUB SERPL-MCNC: 0.5 MG/DL (ref 0.2–1.2)
BUN SERPL-MCNC: 12 MG/DL (ref 7–25)
BUN/CREAT SERPL: 7 (CALC) (ref 6–22)
CALCIUM SERPL-MCNC: 8.8 MG/DL (ref 8.6–10.4)
CALCIUM SERPL-MCNC: 9.1 MG/DL (ref 8.6–10.4)
CHLORIDE SERPL-SCNC: 101 MMOL/L (ref 98–110)
CHOLEST SERPL-MCNC: 91 MG/DL
CHOLEST/HDLC SERPL: 2.8 (CALC)
CK SERPL-CCNC: 24 U/L (ref 29–143)
CO2 SERPL-SCNC: 36 MMOL/L (ref 20–32)
CREAT SERPL-MCNC: 1.79 MG/DL (ref 0.5–1.05)
CREAT UR-MCNC: 70 MG/DL (ref 20–275)
ERYTHROCYTE [DISTWIDTH] IN BLOOD BY AUTOMATED COUNT: 14.6 % (ref 11–15)
FERRITIN SERPL-MCNC: 23 NG/ML (ref 16–288)
GFR/BSA.PRED SERPLBLD CYS-BASED-ARV: 31 ML/MIN/1.73M2
GLOBULIN SER CALC-MCNC: 4 G/DL (CALC) (ref 1.9–3.7)
GLUCOSE SERPL-MCNC: 103 MG/DL (ref 65–99)
HCT VFR BLD AUTO: 30.1 % (ref 35–45)
HDLC SERPL-MCNC: 32 MG/DL
HGB BLD-MCNC: 9 G/DL (ref 11.7–15.5)
IRON SATN MFR SERPL: 11 % (CALC) (ref 16–45)
IRON SERPL-MCNC: 39 MCG/DL (ref 45–160)
LDLC SERPL CALC-MCNC: 41 MG/DL (CALC)
MAGNESIUM SERPL-MCNC: 1.9 MG/DL (ref 1.5–2.5)
MCH RBC QN AUTO: 28.8 PG (ref 27–33)
MCHC RBC AUTO-ENTMCNC: 29.9 G/DL (ref 32–36)
MCV RBC AUTO: 96.2 FL (ref 80–100)
NONHDLC SERPL-MCNC: 59 MG/DL (CALC)
PHOSPHATE SERPL-MCNC: 3.3 MG/DL (ref 2.5–4.5)
PLATELET # BLD AUTO: 290 THOUSAND/UL (ref 140–400)
PMV BLD REES-ECKER: 10.2 FL (ref 7.5–12.5)
POTASSIUM SERPL-SCNC: 4.3 MMOL/L (ref 3.5–5.3)
PROT SERPL-MCNC: 7.2 G/DL (ref 6.1–8.1)
PROT UR-MCNC: 48 MG/DL (ref 5–24)
PROT/CREAT UR: 0.69 MG/MG CREAT (ref 0.02–0.18)
PROT/CREAT UR: 686 MG/G CREAT (ref 24–184)
PTH-INTACT SERPL-MCNC: 93 PG/ML (ref 16–77)
RBC # BLD AUTO: 3.13 MILLION/UL (ref 3.8–5.1)
SODIUM SERPL-SCNC: 143 MMOL/L (ref 135–146)
TIBC SERPL-MCNC: 353 MCG/DL (CALC) (ref 250–450)
TRIGL SERPL-MCNC: 97 MG/DL
WBC # BLD AUTO: 5.3 THOUSAND/UL (ref 3.8–10.8)

## 2023-02-15 ENCOUNTER — OFFICE VISIT (OUTPATIENT)
Dept: FAMILY MEDICINE CLINIC | Facility: CLINIC | Age: 63
End: 2023-02-15

## 2023-02-15 VITALS
TEMPERATURE: 98.6 F | DIASTOLIC BLOOD PRESSURE: 90 MMHG | WEIGHT: 293 LBS | RESPIRATION RATE: 16 BRPM | HEIGHT: 64 IN | SYSTOLIC BLOOD PRESSURE: 130 MMHG | BODY MASS INDEX: 50.02 KG/M2

## 2023-02-15 DIAGNOSIS — E78.5 DYSLIPIDEMIA: ICD-10-CM

## 2023-02-15 DIAGNOSIS — D62 ACUTE BLOOD LOSS ANEMIA: ICD-10-CM

## 2023-02-15 DIAGNOSIS — E66.9 DIABETES MELLITUS TYPE 2 IN OBESE (HCC): Primary | ICD-10-CM

## 2023-02-15 DIAGNOSIS — E11.69 DIABETES MELLITUS TYPE 2 IN OBESE (HCC): Primary | ICD-10-CM

## 2023-02-15 DIAGNOSIS — N18.4 CKD (CHRONIC KIDNEY DISEASE) STAGE 4, GFR 15-29 ML/MIN (HCC): ICD-10-CM

## 2023-02-15 DIAGNOSIS — E03.4 HYPOTHYROIDISM DUE TO ACQUIRED ATROPHY OF THYROID: Chronic | ICD-10-CM

## 2023-02-15 DIAGNOSIS — J96.22 ACUTE AND CHRONIC RESPIRATORY FAILURE WITH HYPERCAPNIA (HCC): ICD-10-CM

## 2023-02-15 DIAGNOSIS — J43.2 CENTRILOBULAR EMPHYSEMA (HCC): ICD-10-CM

## 2023-02-15 DIAGNOSIS — Z79.899 OTHER LONG TERM (CURRENT) DRUG THERAPY: ICD-10-CM

## 2023-02-15 DIAGNOSIS — K92.1 MELENA: ICD-10-CM

## 2023-02-15 DIAGNOSIS — N25.81 SECONDARY HYPERPARATHYROIDISM OF RENAL ORIGIN (HCC): ICD-10-CM

## 2023-02-15 DIAGNOSIS — F41.0 PANIC ATTACK: ICD-10-CM

## 2023-02-15 DIAGNOSIS — R06.02 SOB (SHORTNESS OF BREATH): ICD-10-CM

## 2023-02-15 DIAGNOSIS — I48.11 LONGSTANDING PERSISTENT ATRIAL FIBRILLATION (HCC): ICD-10-CM

## 2023-02-15 DIAGNOSIS — F33.9 DEPRESSION, RECURRENT (HCC): ICD-10-CM

## 2023-02-15 DIAGNOSIS — E66.01 MORBID OBESITY WITH BMI OF 50.0-59.9, ADULT (HCC): ICD-10-CM

## 2023-02-15 DIAGNOSIS — E11.49 OTHER DIABETIC NEUROLOGICAL COMPLICATION ASSOCIATED WITH TYPE 2 DIABETES MELLITUS (HCC): ICD-10-CM

## 2023-02-15 DIAGNOSIS — I48.0 PAROXYSMAL ATRIAL FIBRILLATION (HCC): Chronic | ICD-10-CM

## 2023-02-15 DIAGNOSIS — E11.21 DIABETIC NEPHROPATHY ASSOCIATED WITH TYPE 2 DIABETES MELLITUS (HCC): ICD-10-CM

## 2023-02-15 DIAGNOSIS — E03.8 OTHER SPECIFIED HYPOTHYROIDISM: ICD-10-CM

## 2023-02-15 DIAGNOSIS — F41.9 ANXIETY: ICD-10-CM

## 2023-02-15 PROBLEM — A41.9 SEVERE SEPSIS (HCC): Status: RESOLVED | Noted: 2020-09-29 | Resolved: 2023-02-15

## 2023-02-15 PROBLEM — R65.20 SEVERE SEPSIS (HCC): Status: RESOLVED | Noted: 2020-09-29 | Resolved: 2023-02-15

## 2023-02-15 PROBLEM — R79.1 SUPRATHERAPEUTIC INR: Status: RESOLVED | Noted: 2021-08-08 | Resolved: 2023-02-15

## 2023-02-15 RX ORDER — HYDROXYZINE 50 MG/1
50 TABLET, FILM COATED ORAL
Qty: 30 TABLET | Refills: 12 | Status: SHIPPED | OUTPATIENT
Start: 2023-02-15

## 2023-02-15 RX ORDER — PANTOPRAZOLE SODIUM 40 MG/1
40 TABLET, DELAYED RELEASE ORAL DAILY
Qty: 90 TABLET | Refills: 1 | Status: SHIPPED | OUTPATIENT
Start: 2023-02-15 | End: 2023-05-16

## 2023-02-15 RX ORDER — FERROUS SULFATE 325(65) MG
325 TABLET ORAL
Qty: 90 TABLET | Refills: 3 | Status: SHIPPED | OUTPATIENT
Start: 2023-02-15

## 2023-02-15 RX ORDER — LORAZEPAM 0.5 MG/1
0.5 TABLET ORAL 2 TIMES DAILY PRN
Qty: 60 TABLET | Refills: 4 | Status: SHIPPED | OUTPATIENT
Start: 2023-02-15

## 2023-02-15 RX ORDER — LEVOTHYROXINE SODIUM 0.1 MG/1
100 TABLET ORAL DAILY
Qty: 90 TABLET | Refills: 3 | Status: SHIPPED | OUTPATIENT
Start: 2023-02-15

## 2023-02-15 RX ORDER — WARFARIN SODIUM 3 MG/1
3 TABLET ORAL DAILY
Qty: 90 TABLET | Refills: 3 | Status: SHIPPED | OUTPATIENT
Start: 2023-02-15

## 2023-02-15 RX ORDER — FUROSEMIDE 40 MG/1
40 TABLET ORAL DAILY
Qty: 90 TABLET | Refills: 3 | Status: SHIPPED | OUTPATIENT
Start: 2023-02-15 | End: 2023-05-16

## 2023-02-15 RX ORDER — ALBUTEROL SULFATE 90 UG/1
AEROSOL, METERED RESPIRATORY (INHALATION)
COMMUNITY
Start: 2023-02-10

## 2023-02-15 NOTE — ASSESSMENT & PLAN NOTE
Lab Results   Component Value Date    EGFR 31 (L) 02/13/2023    EGFR 21 (L) 12/27/2022    EGFR 28 12/14/2022    CREATININE 1 79 (H) 02/13/2023    CREATININE 2 49 (H) 12/27/2022    CREATININE 1 85 (H) 12/14/2022   BMP reviewed  GFR 31 from 21    No volume overload

## 2023-02-15 NOTE — PROGRESS NOTES
Name: Hakan Myers      : 1960      MRN: [id number]  Encounter Provider: Bladimir Gray MD  Encounter Date: 2/15/2023   Encounter department: [address] [place] [address]  Diabetes mellitus type 2 in [facility])  Assessment & Plan:    Lab Results   Component Value Date    HGBA1C 7 0 (H) 2022   Check A1c next visit  Watching diet    Orders:  -     [facility] Prescribed Meds and Special Instructions  -     Amphetamines, Methamphetamines  -     Butalbital  -     Phenobarbital  -     Secobarbital  -     Temazepam  -     Alprazolam  -     Clonazepam  -     Diazepam  -     Lorazepam  -     Oxazepam  -     Gabapentin  -     Pregabalin  -     Cocaine  -     Heroin  -     Buprenorphine  -     Levorphanol  -     Meperidine  -     Naltrexone  -     Fentanyl  -     Methadone  -     Oxycodone  -     Oxymorphone  -     Tapentadol  -     THC  -     Tramadol  -     Codeine, Hydrocodone, Hydropmorphone, Morphine  -     Bath Salts  -     Ethyl Glucuronide/Ethyl Sulfate  -     Kratom  -     Spice  -     Methylphenidate  -     Phentermine  -     Validity Oxidant  -     Validity Creatinine  -     Validity pH  -     Validity Specific    2   Diabetic nephropathy associated with type 2 diabetes mellitus (HCC)  -     [facility] Prescribed Meds and Special Instructions  -     Amphetamines, Methamphetamines  -     Butalbital  -     Phenobarbital  -     Secobarbital  -     Temazepam  -     Alprazolam  -     Clonazepam  -     Diazepam  -     Lorazepam  -     Oxazepam  -     Gabapentin  -     Pregabalin  -     Cocaine  -     Heroin  -     Buprenorphine  -     Levorphanol  -     Meperidine  -     Naltrexone  -     Fentanyl  -     Methadone  -     Oxycodone  -     Oxymorphone  -     Tapentadol  -     THC  -     Tramadol  -     Codeine, Hydrocodone, Hydropmorphone, Morphine  -     Bath Salts  -     Ethyl Glucuronide/Ethyl Sulfate  -     Kratom  -     Spice  -     Methylphenidate  - Phentermine  -     Validity Oxidant  -     Validity Creatinine  -     Validity pH  -     Validity Specific    3  Other diabetic neurological complication associated with type 2 diabetes mellitus (HCC)  -     [facility] Prescribed Meds and Special Instructions  -     Amphetamines, Methamphetamines  -     Butalbital  -     Phenobarbital  -     Secobarbital  -     Temazepam  -     Alprazolam  -     Clonazepam  -     Diazepam  -     Lorazepam  -     Oxazepam  -     Gabapentin  -     Pregabalin  -     Cocaine  -     Heroin  -     Buprenorphine  -     Levorphanol  -     Meperidine  -     Naltrexone  -     Fentanyl  -     Methadone  -     Oxycodone  -     Oxymorphone  -     Tapentadol  -     THC  -     Tramadol  -     Codeine, Hydrocodone, Hydropmorphone, Morphine  -     Bath Salts  -     Ethyl Glucuronide/Ethyl Sulfate  -     Kratom  -     Spice  -     Methylphenidate  -     Phentermine  -     Validity Oxidant  -     Validity Creatinine  -     Validity pH  -     Validity Specific    4  Hypothyroidism due to acquired atrophy of thyroid  -     [facility] Prescribed Meds and Special Instructions  -     Amphetamines, Methamphetamines  -     Butalbital  -     Phenobarbital  -     Secobarbital  -     Temazepam  -     Alprazolam  -     Clonazepam  -     Diazepam  -     Lorazepam  -     Oxazepam  -     Gabapentin  -     Pregabalin  -     Cocaine  -     Heroin  -     Buprenorphine  -     Levorphanol  -     Meperidine  -     Naltrexone  -     Fentanyl  -     Methadone  -     Oxycodone  -     Oxymorphone  -     Tapentadol  -     THC  -     Tramadol  -     Codeine, Hydrocodone, Hydropmorphone, Morphine  -     Bath Salts  -     Ethyl Glucuronide/Ethyl Sulfate  -     Kratom  -     Spice  -     Methylphenidate  -     Phentermine  -     Validity Oxidant  -     Validity Creatinine  -     Validity pH  -     Validity Specific    5  Paroxysmal atrial fibrillation (HCC)  Assessment & Plan:   rate under control  On Coumadin    Advised to get INR done ASAP    Orders:  -     [facility] Prescribed Meds and Special Instructions  -     Amphetamines, Methamphetamines  -     Butalbital  -     Phenobarbital  -     Secobarbital  -     Temazepam  -     Alprazolam  -     Clonazepam  -     Diazepam  -     Lorazepam  -     Oxazepam  -     Gabapentin  -     Pregabalin  -     Cocaine  -     Heroin  -     Buprenorphine  -     Levorphanol  -     Meperidine  -     Naltrexone  -     Fentanyl  -     Methadone  -     Oxycodone  -     Oxymorphone  -     Tapentadol  -     THC  -     Tramadol  -     Codeine, Hydrocodone, Hydropmorphone, Morphine  -     Bath Salts  -     Ethyl Glucuronide/Ethyl Sulfate  -     Kratom  -     Spice  -     Methylphenidate  -     Phentermine  -     Validity Oxidant  -     Validity Creatinine  -     Validity pH  -     Validity Specific  -     Protime-INR; Standing  -     Protime-INR    6  CKD (chronic kidney disease) stage 4, GFR 15-29 ml/min [facility])  Assessment & Plan:  Lab Results   Component Value Date    EGFR 31 (L) 02/13/2023    EGFR 21 (L) 12/27/2022    EGFR 28 12/14/2022    CREATININE 1 79 (H) 02/13/2023    CREATININE 2 49 (H) 12/27/2022    CREATININE 1 85 (H) 12/14/2022   BMP reviewed  GFR 31 from 21    No volume overload    Orders:  -     [facility] Prescribed Meds and Special Instructions  -     Amphetamines, Methamphetamines  -     Butalbital  -     Phenobarbital  -     Secobarbital  -     Temazepam  -     Alprazolam  -     Clonazepam  -     Diazepam  -     Lorazepam  -     Oxazepam  -     Gabapentin  -     Pregabalin  -     Cocaine  -     Heroin  -     Buprenorphine  -     Levorphanol  -     Meperidine  -     Naltrexone  -     Fentanyl  -     Methadone  -     Oxycodone  -     Oxymorphone  -     Tapentadol  -     THC  -     Tramadol  -     Codeine, Hydrocodone, Hydropmorphone, Morphine  -     Bath Salts  -     Ethyl Glucuronide/Ethyl Sulfate  -     Kratom  -     Spice  -     Methylphenidate  -     Phentermine  -     Validity Oxidant  -     Validity Creatinine  -     Validity pH  -     Validity Specific    7  Dyslipidemia  Assessment & Plan:  Profile reviewed  LDL 41  Continue atorvastatin  Orders:  -     [facility] Prescribed Meds and Special Instructions  -     Amphetamines, Methamphetamines  -     Butalbital  -     Phenobarbital  -     Secobarbital  -     Temazepam  -     Alprazolam  -     Clonazepam  -     Diazepam  -     Lorazepam  -     Oxazepam  -     Gabapentin  -     Pregabalin  -     Cocaine  -     Heroin  -     Buprenorphine  -     Levorphanol  -     Meperidine  -     Naltrexone  -     Fentanyl  -     Methadone  -     Oxycodone  -     Oxymorphone  -     Tapentadol  -     THC  -     Tramadol  -     Codeine, Hydrocodone, Hydropmorphone, Morphine  -     Bath Salts  -     Ethyl Glucuronide/Ethyl Sulfate  -     Kratom  -     Spice  -     Methylphenidate  -     Phentermine  -     Validity Oxidant  -     Validity Creatinine  -     Validity pH  -     Validity Specific    8  Anxiety  Assessment & Plan:  Lorazepam- increase bid prn  PDMP reviewed  Pain contract signed  Drug screen sample collected  Add sertraline 50 mg once a day with breakfast due to increased anxiety  Possible benefits and side effects were discussed with the patient      Orders:  -     [facility] Prescribed Meds and Special Instructions  -     Amphetamines, Methamphetamines  -     Butalbital  -     Phenobarbital  -     Secobarbital  -     Temazepam  -     Alprazolam  -     Clonazepam  -     Diazepam  -     Lorazepam  -     Oxazepam  -     Gabapentin  -     Pregabalin  -     Cocaine  -     Heroin  -     Buprenorphine  -     Levorphanol  -     Meperidine  -     Naltrexone  -     Fentanyl  -     Methadone  -     Oxycodone  -     Oxymorphone  -     Tapentadol  -     THC  -     Tramadol  -     Codeine, Hydrocodone, Hydropmorphone, Morphine  -     Bath Salts  -     Ethyl Glucuronide/Ethyl Sulfate  -     Kratom  -     Spice  -     Methylphenidate  - Phentermine  -     Validity Oxidant  -     Validity Creatinine  -     Validity pH  -     Validity Specific  -     sertraline (Zoloft) 50 mg tablet; Take 1 tablet (50 mg total) by mouth daily  -     hydrOXYzine HCL (ATARAX) 50 mg tablet; Take 1 tablet (50 mg total) by mouth daily at bedtime    9  Other long term (current) drug therapy  -     [facility] All Prescribed Meds and Special Instructions  -     Amphetamines, Methamphetamines  -     Butalbital  -     Phenobarbital  -     Secobarbital  -     Temazepam  -     Alprazolam  -     Clonazepam  -     Diazepam  -     Lorazepam  -     Oxazepam  -     Gabapentin  -     Pregabalin  -     Cocaine  -     Heroin  -     Buprenorphine  -     Levorphanol  -     Meperidine  -     Naltrexone  -     Fentanyl  -     Methadone  -     Oxycodone  -     Oxymorphone  -     Tapentadol  -     THC  -     Tramadol  -     Codeine, Hydrocodone, Hydropmorphone, Morphine  -     Bath Salts  -     Ethyl Glucuronide/Ethyl Sulfate  -     Kratom  -     Spice  -     Methylphenidate  -     Phentermine  -     Validity Oxidant  -     Validity Creatinine  -     Validity pH  -     Validity Specific    10  Centrilobular emphysema ([facility] )    11  Depression, recurrent ([facility] )    12  Acute and chronic respiratory failure with hypercapnia (HCC)    13  Morbid obesity with BMI of 50 0-59 9, adult ([facility] )    14  [facility]    15  Panic attack  -     LORazepam (ATIVAN) 0 5 mg tablet; Take 1 tablet (0 5 mg total) by mouth 2 (two) times a day as needed for anxiety    16  Acute blood loss anemia  Assessment & Plan:  Level is stable around 9  Orders:  -     ferrous sulfate 325 (65 Fe) mg tablet; Take 1 tablet (325 mg total) by mouth daily with breakfast  -     pantoprazole (PROTONIX) 40 mg tablet; Take 1 tablet (40 mg total) by mouth daily    17  SOB (shortness of breath)  -     furosemide (LASIX) 40 mg tablet; Take 1 tablet (40 mg total) by mouth daily    18   Other specified hypothyroidism  -     levothyroxine 100 mcg tablet; Take 1 tablet (100 mcg total) by mouth daily    19  Melena  -     pantoprazole (PROTONIX) 40 mg tablet; Take 1 tablet (40 mg total) by mouth daily    20  Longstanding persistent atrial fibrillation (HCC)  -     warfarin (COUMADIN) 3 mg tablet; Take 1 tablet (3 mg total) by mouth daily    BMI Counseling: Body mass index is 55 61 kg/m²  The BMI is above normal  Nutrition recommendations include decreasing portion sizes  Exercise recommendations include strength training exercises  Rationale for BMI follow-up plan is due to patient being overweight or obese  BMI Counseling: Body mass index is 55 61 kg/m²  Follow-up plan was not completed due to elderly patient (71 years old) where weight reduction/weight gain would complicate underlying health condition such as: illness or physical disability  Subjective      1  Dm-2-no polyuria or polydipsia  No increased numbness  No open skin areas  No claudication pain  No chest pain  No increased dyspnea  No abdominal pain  2  htn-no headache dizziness or lightheadedness  No palpitations  No syncope  No orthopnea  No increased edema  3  ckd-4-far was around 31  No hematuria  No trouble urinating  Seeing nephrologist regularly  4  Dyslipidemia-DL level is good  Tolerating atorvastatin well  No nausea vomiting or abdominal pain  No claudication pain  No chest pain    Review of Systems   Constitutional: Negative for appetite change, chills, diaphoresis, fatigue and fever  HENT: Negative for congestion, drooling and sinus pain  Eyes: Negative for discharge and itching  Respiratory: Positive for shortness of breath  Negative for cough and chest tightness  Cardiovascular: Negative for chest pain and palpitations  Gastrointestinal: Negative  Endocrine: Negative for polyphagia and polyuria  Genitourinary: Negative for difficulty urinating, dysuria, frequency and urgency     Musculoskeletal: Positive for gait problem  Skin: Negative for pallor  Allergic/Immunologic: Negative for food allergies  Neurological: Negative for dizziness, seizures, speech difficulty, light-headedness and headaches  Hematological: Negative for adenopathy  Does not bruise/bleed easily  Psychiatric/Behavioral: Negative for agitation, confusion and decreased concentration         Current Outpatient Medications on File Prior to Visit   Medication Sig   • albuterol (2 5 mg/3 mL) 0 083 % nebulizer solution INHALE CONTENTS OF 1 VIAL ( 3 MILLILITERS ) IN NEBULIZER BY MOUTH AND INTO THE LUNGS EVERY 6 HOURS IF NEEDED FOR WHEEZING OR SHORTNESS OF BREATH   • albuterol (PROVENTIL HFA,VENTOLIN HFA) 90 mcg/act inhaler inhale 2 puffs every 4 hours if needed for wheezing   • amLODIPine (NORVASC) 5 mg tablet Take 1 tablet (5 mg total) by mouth daily   • atorvastatin (LIPITOR) 10 mg tablet Take 1 tablet (10 mg total) by mouth daily   • collagenase (SANTYL) ointment Apply topically daily   • Embrace Lancets Ultra Thin 30G MISC Use 2 (two) times a day   • famotidine (PEPCID) 20 mg tablet Take 1 tablet (20 mg total) by mouth daily   • metoprolol tartrate (LOPRESSOR) 50 mg tablet Take 1 tablet (50 mg total) by mouth 2 (two) times a day   • nystatin (MYCOSTATIN) cream Apply topically 2 (two) times a day   • [DISCONTINUED] ferrous sulfate 325 (65 Fe) mg tablet Take 1 tablet (325 mg total) by mouth daily with breakfast   • [DISCONTINUED] furosemide (LASIX) 40 mg tablet Take 1 tablet (40 mg total) by mouth daily   • [DISCONTINUED] hydrOXYzine HCL (ATARAX) 50 mg tablet Take 1 tablet (50 mg total) by mouth daily at bedtime   • [DISCONTINUED] levothyroxine 100 mcg tablet take 1 tablet by mouth in THE EARLY MORNING   • [DISCONTINUED] LORazepam (ATIVAN) 0 5 mg tablet take 1 tablet by mouth at bedtime   • [DISCONTINUED] pantoprazole (PROTONIX) 40 mg tablet Take 1 tablet (40 mg total) by mouth daily   • [DISCONTINUED] warfarin (COUMADIN) 3 mg tablet Take 1 tablet (3 mg total) by mouth daily   • Blood Glucose Monitoring Suppl (Embrace Pro Glucose Meter) SAVANNAH Use 2 (two) times a day Embrace glucometer, test twice daily   • umeclidinium-vilanterol (Anoro Ellipta) 62 5-25 mcg/actuation inhaler Inhale 1 puff daily       Objective     /90 (BP Location: Left arm, Patient Position: Sitting, Cuff Size: Large)   Temp 98 6 °F (37 °C) (Temporal)   Resp 16   Ht 5' 4" (1 626 m)   Wt (!) 193 kg (425 lb)   BMI 72 95 kg/m²     Physical Exam  Vitals and nursing note reviewed  Constitutional:       Appearance: Normal appearance  She is well-developed  She is obese  She is not ill-appearing  HENT:      Head: Atraumatic  Eyes:      General:         Right eye: No discharge  Left eye: No discharge  Neck:      Thyroid: No thyromegaly  Cardiovascular:      Rate and Rhythm: Normal rate  Rhythm irregular  Pulses: Pulses are weak  Dorsalis pedis pulses are 1+ on the right side and 1+ on the left side  Pulmonary:      Effort: Pulmonary effort is normal       Breath sounds: No wheezing  Chest:      Chest wall: No tenderness  Abdominal:      General: Bowel sounds are normal       Palpations: Abdomen is soft  Tenderness: There is no abdominal tenderness  Musculoskeletal:         General: No tenderness  Cervical back: Neck supple  Feet:      Right foot:      Skin integrity: Callus present  No ulcer, skin breakdown, erythema, warmth or dry skin  Left foot:      Skin integrity: Callus present  No ulcer, skin breakdown, erythema, warmth or dry skin  Lymphadenopathy:      Cervical: No cervical adenopathy  Skin:     Findings: No erythema  Neurological:      Mental Status: She is alert and oriented to person, place, and time        Gait: Gait abnormal    Psychiatric:         Mood and Affect: Mood normal          Behavior: Behavior normal          Judgment: Judgment normal        TICO Mcfaddeniabetic Foot Exam    Patient's shoes and socks removed  Right Foot/Ankle   Right Foot Inspection  Skin Exam: skin normal, skin intact, callus and callus  No dry skin, no warmth, no erythema, no maceration, no abnormal color, no pre-ulcer and no ulcer  Toe Exam: ROM and strength within normal limits  Sensory   Monofilament testing: intact    Vascular  Capillary refills: < 3 seconds  The right DP pulse is 1+  Left Foot/Ankle  Left Foot Inspection  Skin Exam: skin normal, skin intact and callus  No dry skin, no warmth, no erythema, no maceration, normal color, no pre-ulcer and no ulcer  Toe Exam: ROM and strength within normal limits  Sensory   Monofilament testing: intact    Vascular  Capillary refills: < 3 seconds  The left DP pulse is 1+       Assign Risk Category  Deformity present  No loss of protective sensation  Weak pulses

## 2023-02-15 NOTE — ASSESSMENT & PLAN NOTE
Lab Results   Component Value Date    HGBA1C 7 0 (H) 11/01/2022   Check A1c next visit    Watching diet

## 2023-02-15 NOTE — ASSESSMENT & PLAN NOTE
Lorazepam- increase bid prn  PDMP reviewed  Pain contract signed  Drug screen sample collected  Add sertraline 50 mg once a day with breakfast due to increased anxiety  Possible benefits and side effects were discussed with the patient

## 2023-02-16 ENCOUNTER — TELEPHONE (OUTPATIENT)
Dept: NEPHROLOGY | Facility: CLINIC | Age: 63
End: 2023-02-16

## 2023-02-16 ENCOUNTER — OFFICE VISIT (OUTPATIENT)
Dept: NEPHROLOGY | Facility: CLINIC | Age: 63
End: 2023-02-16

## 2023-02-16 VITALS — BODY MASS INDEX: 50.02 KG/M2 | WEIGHT: 293 LBS | HEIGHT: 64 IN

## 2023-02-16 DIAGNOSIS — E61.1 IRON DEFICIENCY: ICD-10-CM

## 2023-02-16 DIAGNOSIS — E66.01 MORBID OBESITY WITH BMI OF 50.0-59.9, ADULT (HCC): ICD-10-CM

## 2023-02-16 DIAGNOSIS — R80.1 PERSISTENT PROTEINURIA: ICD-10-CM

## 2023-02-16 DIAGNOSIS — N18.4 CKD (CHRONIC KIDNEY DISEASE) STAGE 4, GFR 15-29 ML/MIN (HCC): ICD-10-CM

## 2023-02-16 DIAGNOSIS — E78.5 DYSLIPIDEMIA: ICD-10-CM

## 2023-02-16 DIAGNOSIS — N18.4 ANEMIA IN STAGE 4 CHRONIC KIDNEY DISEASE (HCC): ICD-10-CM

## 2023-02-16 DIAGNOSIS — R91.8 ABNORMAL LUNG FIELD: ICD-10-CM

## 2023-02-16 DIAGNOSIS — E11.21 DIABETIC NEPHROPATHY ASSOCIATED WITH TYPE 2 DIABETES MELLITUS (HCC): ICD-10-CM

## 2023-02-16 DIAGNOSIS — D63.1 ANEMIA IN STAGE 4 CHRONIC KIDNEY DISEASE (HCC): ICD-10-CM

## 2023-02-16 DIAGNOSIS — N25.81 SECONDARY HYPERPARATHYROIDISM OF RENAL ORIGIN (HCC): ICD-10-CM

## 2023-02-16 DIAGNOSIS — I12.9 HYPERTENSIVE CHRONIC KIDNEY DISEASE WITH STAGE 1 THROUGH STAGE 4 CHRONIC KIDNEY DISEASE, OR UNSPECIFIED CHRONIC KIDNEY DISEASE: Primary | ICD-10-CM

## 2023-02-16 NOTE — PATIENT INSTRUCTIONS
1  Medication changes today:  Please decrease amlodipine to half a pill or 2 5 mg daily if you have difficulty cutting the pill in half please let me know  This is because of the low blood pressure  Please avoid salt is much as possible    Please go for a chest x-ray at your convenience at this time to make sure that your lungs are clear of fluid    Please follow-up with the gastroenterology team for that procedure they recommended which was the capsule endoscopy when you can exclamation    2  Please go for a nonfasting lab in about 6 weeks to recheck your blood count/hemoglobin    3  Please take 1 week a blood pressure readings in about 4 weeks after making the above medication change    AS FOLLOWS  MORNING AND EVENING, SITTING AND STANDING as follows:  TAKE THE MORNING READINGS BEFORE ANY MEDICATIONS AND WHEN YOU ARE RELAXED FOR SEVERAL MINUTES  TAKE THE EVENING READINGS:  BETWEEN 7-10 P M ; PRIOR TO ANY MEDICATIONS; AT LEAST IN OUR  FROM DINNER; AND CERTAINLY AFTER RELAXING FOR A FEW MINUTES  PLEASE INCLUDE HEART RATE WITH YOUR BLOOD PRESSURE READINGS  When taking standing readings, keep your arm supported at heart level and not dangling  Make sure you are sitting with your back supported and feet on the ground and do not cross your legs or feet  Make sure you have not taken any coffee or caffeine products or exercised or smoke cigarettes at least 30 minutes before taking your blood pressure  Then please mail these readings into the office    4  Follow-up in 4 months  Please bring in 1 week a blood pressure readings morning evening, sitting and standing is outlined above  PLEASE BRING AN YOUR BLOOD PRESSURE MACHINE TO CORRELATE WITH THE OFFICE MACHINE AT THIS NEXT SCHEDULED VISIT  Please go for fasting lab work 1-2 weeks prior to your appointment      5    General instructions:  AVOID SALT BUT NOT ADDING AN READING LABELS TO MAKE SURE THERE IS LOW-SALT IN THE FOOD THAT YOU ARE EATING  Goal is less than 2 g of sodium intake or less than 5 g of sodium chloride intake per day    Avoid nonsteroidal anti-inflammatory drugs such as Naprosyn, ibuprofen, Aleve, Advil, Celebrex, Meloxicam (Mobic) etc   You can use Tylenol as needed if you do not have any liver condition to be concerned about    Avoid medications such as Sudafed or decongestants and antihistamines that contained the D component which is the decongestant  You can take antihistamines without the decongestant or D component  Try to avoid medications such as pantoprazole or  Protonix/Nexium or Esomeprazole)/Prilosec or omeprazole/Prevacid or lansoprazole/AcipHex or Rabeprazole  If you are able to, use Pepcid as this is safer for your kidneys  Try to exercise at least 30 minutes 3 days a week to begin with with an ultimate goal of 5 days a week for at least 30 minutes    Try to lose 5-10 lb by your next visit    Please do not drink more than 2 glasses of alcohol/wine on a daily basis as this may contribute to your high blood pressure

## 2023-02-16 NOTE — LETTER
February 16, 2023     Lyndsay Floyd MD  [address]  [address] [facility]  Professor Dayanna Lawrence [address]    Patient: Kenn Cutler   YOB: 1960   Date of Visit: 2/16/2023       Dear Dr Charity Lozoya: Thank you for referring Jm Toribio to me for evaluation  Below are my notes for this consultation  If you have questions, please do not hesitate to call me  I look forward to following your patient along with you  Sincerely,        Zaheer Chou MD        CC: MD Ciera Mott MD Medora Savage, MD  2/16/2023 11:20 AM  Sign when Signing Visit  RENAL FOLLOW UP NOTE: td    • ASSESSMENT AND PLAN:  1  [facility] ZARXRED 0 :  • Etiology:  Right radical nephrectomy July 15, 2016 for renal cell CA/hypertensive nephrosclerosis/arteriolar nephrosclerosis/diabetic nephropathy/?  Morbid obesity with?  FSGS  • Baseline creatinine:  new baseline appears to be from 1 8-as high as 2 4  • Current creatinine:  1 79 at baseline  • Urine protein creatinine ratio:  0 688 g at goal  • UA microscopic no proteinuria, trace intact heme from 05/13/2020  Recommendations:  • Treat hypertension-please see below  • Treat dyslipidemia-please see below  • Maintain proteinuria less than 1 g or as low as possible  • Avoid nephrotoxic agents such as NSAIDs, patient counseled as such    2   Volume:  • Current volume:  Euvolemic  • Treatment:  • Continue current Lasix 40 mg daily with an added dose as needed  • Check chest x-ray as outlined below although the lung exam most likely related to atelectasis: Lower extremity edema from lymphedema    3   Hypertension:   Home blood pressures:    P m :  134/67  A  m :   133/80  Heart rate:     • Goal blood pressure:  Less than 130/80  Recommendations:  •   push nonmedical regimen including weight loss, and any form of exercise patient can not tolerate; avoidance of salt; patient counseled as such  • Medication changes today:  • Not sure of the accuracy of her blood pressure machine I would favor to avoid hypotension of decreased amlodipine just half or 2 5 mg in the morning, Continue furosemide 40 mg and the metoprolol  • Check blood pressures in about 4 to 6 weeks    4   Electrolytes:  Chronic metabolic alkalosis most likely from primary CO2 retention, stable at 36    5   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:  • Calcium/magnesium/phosphorus:  All acceptable  • PTH intact:  73 at goal  • Vitamin-D:  47 at goal    6   Dyslipidemia:  • Goal LDL:  Less than 100  • Current lipid profile: LDL 41/HDL 32/triglycerides 97  Recommendations: No changes as patient is at goal    7   Anemia:    -Current value 9 0 slightly better than prior  -iron studies: Saturation 11%/ferritin 23 replete/consider intravenous iron infusion: Discussed with the patient she would strongly prefer iron pills she has difficulty getting to the hospital at times  -B12 and folate deficiency being treated  -multiple myeloma was ruled out in January  -GI evaluation in January of 2018:   colonoscopy demonstrated internal/external hemorrhoids; also colonic polyps which were removed  Juan Carlos Hands was a suboptimal colonic prep   Felt she may require repeat colonoscopy in 3-6 months  EGD demonstrated small hiatal hernia     · GI evaluated the patient December 2022 because of anemia EGD demonstrated gastric duodenal AVMs requiring APC, follow-up secondary endoscopy because of dropping hemoglobin; colonoscopy demonstrated no active bleeding  · Capsule endoscopy was recommended         8   Other problems:  • Right radical nephrectomy for renal cell CA July 15, 2016  • 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 2, 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  • Morbid obesity  • Atrial fibrillation  • Cellulitis of the left leg back in January treated with intravenous antibiotics  • COPD on iron  • EGD involving multiple joints  • Hypothyroidism on supplement  • History of gait dysfunction using motorized scooter to ambulate  • Diabetes mellitus/diabetic neuropathy  • Admission 8/2021 for abdominal pain-workup is CT/EGD/colonoscopy/push enteroscopy all negative except for gastritis-esophagitis; the patient's symptoms resolved spontaneously  Patient with a ventral hernia which she deferred in terms of surgical revision, will follow-up with surgery  • Admission to [address] [place] [address] 12/11/2022 with abdominal wall cellulitis/anemia status posttransfusion  • Colonoscopy 11/30/2022 demonstrated diverticula in the sigmoid colon otherwise normal colonoscopy  • EGD with push enteroscopy was prematurely aborted secondary respiratory instability  • Completed course of antibiotics including Keflex  • Peak creatinine 2 19 on admission came down to 1 85  · Admission December 3, 2022 with acute hypoxic hypercapnic respiratory failure: Felt to have multifocal pneumonia started on empiric antibiotics treated by pulmonary  Also GI evaluation for anemia please see above  · Slight crackles at the left greater than right base I will send her for chest x-ray most likely related to the atelectasis seen       GI health maintenance:  Please see above under other problems  PATIENT INSTRUCTIONS:    Patient Instructions   1  Medication changes today:  • Please decrease amlodipine to half a pill or 2 5 mg daily if you have difficulty cutting the pill in half please let me know  This is because of the low blood pressure  • Please avoid salt is much as possible    Please go for a chest x-ray at your convenience at this time to make sure that your lungs are clear of fluid    Please follow-up with the gastroenterology team for that procedure they recommended which was the capsule endoscopy when you can exclamation    2  Please go for a nonfasting lab in about 6 weeks to recheck your blood count/hemoglobin    3    Please take 1 week a blood pressure readings in about 4 weeks after making the above medication change    AS FOLLOWS  MORNING AND EVENING, SITTING AND STANDING as follows:  · TAKE THE MORNING READINGS BEFORE ANY MEDICATIONS AND WHEN YOU ARE RELAXED FOR SEVERAL MINUTES  · TAKE THE EVENING READINGS:  BETWEEN 7-10 P M ; PRIOR TO ANY MEDICATIONS; AT LEAST IN OUR  FROM DINNER; AND CERTAINLY AFTER RELAXING FOR A FEW MINUTES  · PLEASE INCLUDE HEART RATE WITH YOUR BLOOD PRESSURE READINGS  · When taking standing readings, keep your arm supported at heart level and not dangling  · Make sure you are sitting with your back supported and feet on the ground and do not cross your legs or feet  · Make sure you have not taken any coffee or caffeine products or exercised or smoke cigarettes at least 30 minutes before taking your blood pressure  Then please mail these readings into the office    4  Follow-up in 4 months  • Please bring in 1 week a blood pressure readings morning evening, sitting and standing is outlined above  • PLEASE BRING AN YOUR BLOOD PRESSURE MACHINE TO CORRELATE WITH THE OFFICE MACHINE AT THIS NEXT SCHEDULED VISIT  • Please go for fasting lab work 1-2 weeks prior to your appointment      5  General instructions:  • AVOID SALT BUT NOT ADDING AN READING LABELS TO MAKE SURE THERE IS LOW-SALT IN THE FOOD THAT YOU ARE EATING  o Goal is less than 2 g of sodium intake or less than 5 g of sodium chloride intake per day    • Avoid nonsteroidal anti-inflammatory drugs such as Naprosyn, ibuprofen, Aleve, Advil, Celebrex, Meloxicam (Mobic) etc   You can use Tylenol as needed if you do not have any liver condition to be concerned about    • Avoid medications such as Sudafed or decongestants and antihistamines that contained the D component which is the decongestant  You can take antihistamines without the decongestant or D component  • Try to avoid medications such as pantoprazole or  Protonix/Nexium or Esomeprazole)/Prilosec or omeprazole/Prevacid or lansoprazole/AcipHex or Rabeprazole    If you are able to, use Pepcid as this is safer for your kidneys  • Try to exercise at least 30 minutes 3 days a week to begin with with an ultimate goal of 5 days a week for at least 30 minutes    • Try to lose 5-10 lb by your next visit    • Please do not drink more than 2 glasses of alcohol/wine on a daily basis as this may contribute to your high blood pressure  Subjective: The patient was admitted to the hospital please see above  Overall breathing better since hospitalization feels some shortness of breath on continuous oxygen  No chest pain,, there is no significant swelling  No fevers, chills, or cough or colds  Fair appetite and fair  energy  No hematuria, dysuria, voiding symptoms or foamy urine  Patient with chronic nausea but no vomiting, no diarrhea no abdominal pain and no bright blood per rectum and no tarry stools  No headaches, dizziness or lightheadedness  Blood pressure medications:  • Lopressor 50 mg twice a day  • Lasix 40 mg every other day and as needed dose  • Zotepine 5 mg daily in the morning      ROS:  See HPI, otherwise review of systems as completely reviewed with the patient are negative    Past Medical History:   Diagnosis Date   • Anemia    • Arthritis    • Cancer of kidney ([facility] [address] [address] )    • Chronic kidney disease    • Diabetes mellitus ([address] )    • Disease of thyroid gland    • HLD (hyperlipidemia)    • Hypertension    • Ovarian cancer ([facility] [address] )    • Pneumonia      Past Surgical History:   Procedure Laterality Date   • CHOLECYSTECTOMY     • CT GUIDED PERC DRAINAGE CATHETER PLACEMENT  5/16/2016   • GALLBLADDER SURGERY  05/01/2004   • HYSTERECTOMY  06/01/2018   • NEPHRECTOMY Right 08/02/2018     Family History   Problem Relation Age of Onset   • No Known Problems Mother    • No Known Problems Father       reports that she quit smoking about 12 years ago  Her smoking use included cigarettes  She has a 90 00 pack-year smoking history  She quit smokeless tobacco use about 11 years ago  She reports current drug use   Drug: Marijuana  She reports that she does not drink alcohol  I COMPLETELY REVIEWED THE PAST MEDICAL HISTORY/PAST SURGICAL HISTORY/SOCIAL HISTORY/FAMILY HISTORY/AND MEDICATIONS  AND UPDATED ALL    Objective:     Vitals:   BP sitting on left forearm: 94/70 with a heart rate of 72 and regular    Weight (last 2 days)     Date/Time Weight    02/16/23 1026 153 (338)     Weight: PATIENT REPORTED at 02/16/23 1026        Wt Readings from Last 3 Encounters:   02/16/23 (!) 153 kg (338 lb)   02/15/23 (!) 193 kg (425 lb)   12/11/22 (!) 147 kg (324 lb)       Body mass index is 58 02 kg/m²  Physical Exam: General:  No acute distress/morbidly obese in wheelchair  Skin:  No acute rash  Eyes:  No scleral icterus, noninjected, no discharge from eyes  ENT:  Moist mucous membranes  Neck:  Supple, no jugular venous distention, trachea is midline, no lymphadenopathy and no thyromegaly  Back   No CVAT  Chest:  Clear to auscultation and percussion, good respiratory effort  CVS:  Regular rate and rhythm without a rub, or gallops or murmurs  Abdomen:   Morbidly obese/large pannus,Soft and nontender with normal bowel sounds  Extremities:  No cyanosis and nonpitting lymphedema two thirds of the way up the pretibial region, left leg is wrapped, no arthritic changes, normal range of motion  Neuro:  Grossly intact  Psych:  Alert, oriented x3 and appropriate      Medications:    Current Outpatient Medications:   •  albuterol (2 5 mg/3 mL) 0 083 % nebulizer solution, INHALE CONTENTS OF 1 VIAL ( 3 MILLILITERS ) IN NEBULIZER BY MOUTH AND INTO THE LUNGS EVERY 6 HOURS IF NEEDED FOR WHEEZING OR SHORTNESS OF BREATH, Disp: 75 mL, Rfl: 5  •  albuterol (PROVENTIL HFA,VENTOLIN HFA) 90 mcg/act inhaler, inhale 2 puffs every 4 hours if needed for wheezing, Disp: , Rfl:   •  amLODIPine (NORVASC) 5 mg tablet, Take 1 tablet (5 mg total) by mouth daily, Disp: 90 tablet, Rfl: 2  •  atorvastatin (LIPITOR) 10 mg tablet, Take 1 tablet (10 mg total) by mouth daily, Disp: 90 tablet, Rfl: 3  •  Blood Glucose Monitoring Suppl (Embrace Pro Glucose Meter) SAVANNAH, Use 2 (two) times a day Embrace glucometer, test twice daily, Disp: 1 each, Rfl: 0  •  collagenase (SANTYL) ointment, Apply topically daily, Disp: 15 g, Rfl: 1  •  Embrace Lancets Ultra Thin 30G MISC, Use 2 (two) times a day, Disp: 100 each, Rfl: 5  •  famotidine (PEPCID) 20 mg tablet, Take 1 tablet (20 mg total) by mouth daily, Disp: 90 tablet, Rfl: 3  •  ferrous sulfate 325 (65 Fe) mg tablet, Take 1 tablet (325 mg total) by mouth daily with breakfast, Disp: 90 tablet, Rfl: 3  •  furosemide (LASIX) 40 mg tablet, Take 1 tablet (40 mg total) by mouth daily (Patient taking differently: Take 40 mg by mouth every other day And and as needed dose), Disp: 90 tablet, Rfl: 3  •  hydrOXYzine HCL (ATARAX) 50 mg tablet, Take 1 tablet (50 mg total) by mouth daily at bedtime, Disp: 30 tablet, Rfl: 12  •  levothyroxine 100 mcg tablet, Take 1 tablet (100 mcg total) by mouth daily, Disp: 90 tablet, Rfl: 3  •  LORazepam (ATIVAN) 0 5 mg tablet, Take 1 tablet (0 5 mg total) by mouth 2 (two) times a day as needed for anxiety, Disp: 60 tablet, Rfl: 4  •  metoprolol tartrate (LOPRESSOR) 50 mg tablet, Take 1 tablet (50 mg total) by mouth 2 (two) times a day, Disp: 180 tablet, Rfl: 3  •  nystatin (MYCOSTATIN) cream, Apply topically 2 (two) times a day, Disp: 30 g, Rfl: 3  •  pantoprazole (PROTONIX) 40 mg tablet, Take 1 tablet (40 mg total) by mouth daily, Disp: 90 tablet, Rfl: 1  •  sertraline (Zoloft) 50 mg tablet, Take 1 tablet (50 mg total) by mouth daily, Disp: 30 tablet, Rfl: 5  •  warfarin (COUMADIN) 3 mg tablet, Take 1 tablet (3 mg total) by mouth daily, Disp: 90 tablet, Rfl: 3  •  umeclidinium-vilanterol (Anoro Ellipta) 62 5-25 mcg/actuation inhaler, Inhale 1 puff daily (Patient not taking: Reported on 2/16/2023), Disp: 60 blister, Rfl: 5    Lab, Imaging and other studies: I have personally reviewed pertinent labs    Laboratory Results:  Results for orders placed or performed in visit on 02/13/23   PTH, Intact and Calcium   Result Value Ref Range    PTH, Intact 93 (H) 16 - 77 pg/mL    Calcium 8 8 8 6 - 10 4 mg/dL       Results from last 7 days   Lab Units 02/13/23  0940   WHITE BLOOD CELL COUNT  Thousand/uL 5 3   HEMOGLOBIN  g/dL 9 0*   HEMATOCRIT  % 30 1*   PLATELETS  Thousand/uL 290   POTASSIUM mmol/L 4 3   CHLORIDE mmol/L 101   CO2 mmol/L 36*   BUN mg/dL 12   CREATININE mg/dL 1 79*   CALCIUM mg/dL 8 8  9 1   MAGNESIUM mg/dL 1 9         Radiology review:   chest X-ray    Ultrasound      Portions of the record may have been created with voice recognition software  Occasional wrong word or "sound a like" substitutions may have occurred due to the inherent limitations of voice recognition software  Read the chart carefully and recognize, using context, where substitutions have occurred

## 2023-02-18 LAB

## 2023-02-20 ENCOUNTER — TELEPHONE (OUTPATIENT)
Dept: FAMILY MEDICINE CLINIC | Facility: CLINIC | Age: 63
End: 2023-02-20

## 2023-02-20 DIAGNOSIS — E11.9 TYPE 2 DIABETES MELLITUS WITHOUT COMPLICATION, WITH LONG-TERM CURRENT USE OF INSULIN (HCC): ICD-10-CM

## 2023-02-20 DIAGNOSIS — Z79.4 TYPE 2 DIABETES MELLITUS WITHOUT COMPLICATION, WITH LONG-TERM CURRENT USE OF INSULIN (HCC): ICD-10-CM

## 2023-02-20 RX ORDER — BLOOD-GLUCOSE METER
EACH MISCELLANEOUS
Qty: 100 STRIP | Refills: 4 | Status: SHIPPED | OUTPATIENT
Start: 2023-02-20

## 2023-02-20 RX ORDER — BLOOD-GLUCOSE METER
EACH MISCELLANEOUS 2 TIMES DAILY
Qty: 100 EACH | Refills: 5 | Status: SHIPPED | OUTPATIENT
Start: 2023-02-20

## 2023-02-20 NOTE — TELEPHONE ENCOUNTER
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

## 2023-02-20 NOTE — TELEPHONE ENCOUNTER
My name is Arslan Alston  I'm calling because I need some test strips and some needles  If you could give me a call

## 2023-02-27 ENCOUNTER — TELEPHONE (OUTPATIENT)
Dept: FAMILY MEDICINE CLINIC | Facility: CLINIC | Age: 63
End: 2023-02-27

## 2023-02-27 DIAGNOSIS — E11.9 TYPE 2 DIABETES MELLITUS WITHOUT COMPLICATION, WITH LONG-TERM CURRENT USE OF INSULIN (HCC): Primary | ICD-10-CM

## 2023-02-27 DIAGNOSIS — Z79.4 TYPE 2 DIABETES MELLITUS WITHOUT COMPLICATION, WITH LONG-TERM CURRENT USE OF INSULIN (HCC): Primary | ICD-10-CM

## 2023-02-27 RX ORDER — OMEGA-3S/DHA/EPA/FISH OIL/D3 300MG-1000
400 CAPSULE ORAL DAILY
Qty: 100 TABLET | Refills: 4 | Status: SHIPPED | OUTPATIENT
Start: 2023-02-27

## 2023-02-27 NOTE — TELEPHONE ENCOUNTER
Pt called -- v upset that vitamin D3 Rx has ended (400 U daily) -- pt is requesting a new Rx be sent to the pharmacy for this Vitamin   Pharmacy is [address] [place] [address],   [facility]

## 2023-02-28 ENCOUNTER — TELEPHONE (OUTPATIENT)
Dept: FAMILY MEDICINE CLINIC | Facility: CLINIC | Age: 63
End: 2023-02-28

## 2023-02-28 NOTE — TELEPHONE ENCOUNTER
----- Message from Ronn Goss MD sent at 2/28/2023  8:22 AM EST -----  Please call the patient regarding her abnormal result  Her INR is only 1 1  How much Coumadin is she taking? Did she miss any doses?

## 2023-02-28 NOTE — TELEPHONE ENCOUNTER
Called pt and caretaker answered --informed her that INR was 1 1-- asked whether or not pt is taking her coumadin and the caretaker stated she administers 3 (3 mg) doses daily to ensure the pt is taking  Please advise as to next INR date

## 2023-03-01 NOTE — TELEPHONE ENCOUNTER
Called pt and caretaker answered -- Marquita Gonzalez -- advised her to give pt a total of 4 (3 mg each) coumadin tablets for a total of 12 mg daily  Kala Andrews verbally acknowledged  Also advised to recheck INR in 1 wk

## 2023-03-06 PROBLEM — Z13.820 SCREENING FOR OSTEOPOROSIS: Status: ACTIVE | Noted: 2022-04-25

## 2023-03-06 PROBLEM — Z11.4 SCREENING FOR HIV (HUMAN IMMUNODEFICIENCY VIRUS): Status: ACTIVE | Noted: 2022-04-25

## 2023-03-08 ENCOUNTER — TELEPHONE (OUTPATIENT)
Dept: OTHER | Facility: OTHER | Age: 63
End: 2023-03-08

## 2023-03-08 ENCOUNTER — TELEPHONE (OUTPATIENT)
Dept: FAMILY MEDICINE CLINIC | Facility: CLINIC | Age: 63
End: 2023-03-08

## 2023-03-08 ENCOUNTER — HOSPITAL ENCOUNTER (EMERGENCY)
Facility: HOSPITAL | Age: 63
Discharge: HOME | End: 2023-03-08
Attending: EMERGENCY MEDICINE | Admitting: EMERGENCY MEDICINE

## 2023-03-08 VITALS
RESPIRATION RATE: 18 BRPM | HEART RATE: 65 BPM | SYSTOLIC BLOOD PRESSURE: 132 MMHG | DIASTOLIC BLOOD PRESSURE: 93 MMHG | TEMPERATURE: 97.6 F | OXYGEN SATURATION: 98 %

## 2023-03-08 DIAGNOSIS — R79.1 SUPRATHERAPEUTIC INR: Primary | ICD-10-CM

## 2023-03-08 LAB
ANION GAP SERPL CALCULATED.3IONS-SCNC: 1 MMOL/L (ref 4–13)
APTT PPP: 85 SECONDS (ref 23–37)
BASOPHILS # BLD AUTO: 0.02 THOUSANDS/ÂΜL (ref 0–0.1)
BASOPHILS NFR BLD AUTO: 0 % (ref 0–1)
BUN SERPL-MCNC: 17 MG/DL (ref 5–25)
CALCIUM SERPL-MCNC: 9.2 MG/DL (ref 8.4–10.2)
CHLORIDE SERPL-SCNC: 97 MMOL/L (ref 96–108)
CO2 SERPL-SCNC: 39 MMOL/L (ref 21–32)
CREAT SERPL-MCNC: 1.79 MG/DL (ref 0.6–1.3)
EOSINOPHIL # BLD AUTO: 0.17 THOUSAND/ÂΜL (ref 0–0.61)
EOSINOPHIL NFR BLD AUTO: 3 % (ref 0–6)
ERYTHROCYTE [DISTWIDTH] IN BLOOD BY AUTOMATED COUNT: 15.5 % (ref 11.6–15.1)
GFR SERPL CREATININE-BSD FRML MDRD: 29 ML/MIN/1.73SQ M
GLUCOSE SERPL-MCNC: 97 MG/DL (ref 65–140)
HCT VFR BLD AUTO: 37 % (ref 34.8–46.1)
HGB BLD-MCNC: 10.3 G/DL (ref 11.5–15.4)
IMM GRANULOCYTES # BLD AUTO: 0.04 THOUSAND/UL (ref 0–0.2)
IMM GRANULOCYTES NFR BLD AUTO: 1 % (ref 0–2)
INR PPP: 10.4
INR PPP: 8.72 (ref 0.84–1.19)
LYMPHOCYTES # BLD AUTO: 0.74 THOUSANDS/ÂΜL (ref 0.6–4.47)
LYMPHOCYTES NFR BLD AUTO: 12 % (ref 14–44)
MCH RBC QN AUTO: 28.9 PG (ref 26.8–34.3)
MCHC RBC AUTO-ENTMCNC: 27.8 G/DL (ref 31.4–37.4)
MCV RBC AUTO: 104 FL (ref 82–98)
MONOCYTES # BLD AUTO: 0.38 THOUSAND/ÂΜL (ref 0.17–1.22)
MONOCYTES NFR BLD AUTO: 6 % (ref 4–12)
NEUTROPHILS # BLD AUTO: 4.62 THOUSANDS/ÂΜL (ref 1.85–7.62)
NEUTS SEG NFR BLD AUTO: 78 % (ref 43–75)
NRBC BLD AUTO-RTO: 0 /100 WBCS
PLATELET # BLD AUTO: 237 THOUSANDS/UL (ref 149–390)
PMV BLD AUTO: 9.6 FL (ref 8.9–12.7)
POTASSIUM SERPL-SCNC: 4.4 MMOL/L (ref 3.5–5.3)
PROTHROMBIN TIME: 72.3 SECONDS (ref 11.6–14.5)
PROTHROMBIN TIME: 90 SEC (ref 9–11.5)
RBC # BLD AUTO: 3.57 MILLION/UL (ref 3.81–5.12)
SODIUM SERPL-SCNC: 137 MMOL/L (ref 135–147)
WBC # BLD AUTO: 5.97 THOUSAND/UL (ref 4.31–10.16)

## 2023-03-08 NOTE — TELEPHONE ENCOUNTER
Lab Result: Critical Lab Result: Inr 10 4 verified by repeat Analysis  Date/Time Drawn: 03-08-23 at 5748  Ordering Provider: Dr Juan Floyd Name: [phone] Adin Abreu from [place]- Ref # [id number]    Please allow the on-call provider 20-30 mins to respond  If you have not heard from them within that time, please feel free to call back

## 2023-03-08 NOTE — DISCHARGE INSTRUCTIONS
Please return to the emergency department for worsening symptoms including chest pain, shortness of breath, dizziness, lightheadedness, fever greater than 103, severe pain, inability to walk, fainting episodes, etc  Please follow-up with your family practice provider as soon as possible  Please continue to hold your Coumadin at home until you are reevaluated by your family practice provider  If you have any evidence of active bleeding, please return to the emergency department for evaluation  If you have any trauma to the head or to any other portion of the body, you may return to the emergency department as you are at increased risk of bleed until your INR returns to your baseline

## 2023-03-08 NOTE — ED PROVIDER NOTES
History  Chief Complaint   Patient presents with   • Abnormal Lab     Pt sent here for eval of INR 10   Pt has no complaints  This is a 68-year-old female with past medical history significant for chronic kidney disease, type 2 diabetes mellitus, and atrial fibrillation with longtime use of Coumadin presenting to the emergency department today for supratherapeutic INR  The patient's INR was noted to be 1 1 approximately 1 week ago  The patient's PCP recommended she triple her current Coumadin dose; for the past week, the patient has been taking approximately 9 mg of Coumadin daily  The patient had routine blood work done yesterday and her INR was noted to be 10 4  The patient denies any complaints whatsoever  She has no chest pain or shortness of breath  She denies any hematochezia or melena  She denies any bleeding from the gums  She has no nausea or vomiting  She has no bruising or changes to her skin  She denies any falls  She had a colonoscopy in late 2022  The patient denies other complaints at this time  History provided by:  Patient   used: No    Medical Problem  Onset quality:  Sudden  Progression:  Worsening  Chronicity:  New  Associated symptoms: no abdominal pain, no chest pain, no congestion, no cough, no diarrhea, no ear pain, no fatigue, no fever, no headaches, no loss of consciousness, no myalgias, no nausea, no rash, no rhinorrhea, no shortness of breath, no sore throat, no vomiting and no wheezing        Prior to Admission Medications   Prescriptions Last Dose Informant Patient Reported? Taking?    Blood Glucose Monitoring Suppl (Embrace Pro Glucose Meter) SAVANNAH   No No   Sig: Use 2 (two) times a day Epunchitace glucometer, test twice daily   Embrace Lancets Ultra Thin 30G MISC   No No   Sig: Use 2 (two) times a day   LORazepam (ATIVAN) 0 5 mg tablet   No No   Sig: Take 1 tablet (0 5 mg total) by mouth 2 (two) times a day as needed for anxiety   albuterol (2 5 mg/3 mL) 0 083 % nebulizer solution   No No   Sig: INHALE CONTENTS OF 1 VIAL ( 3 MILLILITERS ) IN NEBULIZER BY MOUTH AND INTO THE LUNGS EVERY 6 HOURS IF NEEDED FOR WHEEZING OR SHORTNESS OF BREATH   albuterol (PROVENTIL HFA,VENTOLIN HFA) 90 mcg/act inhaler   Yes No   Sig: inhale 2 puffs every 4 hours if needed for wheezing   amLODIPine (NORVASC) 5 mg tablet   No No   Sig: Take 1 tablet (5 mg total) by mouth daily   atorvastatin (LIPITOR) 10 mg tablet   No No   Sig: Take 1 tablet (10 mg total) by mouth daily   cholecalciferol (VITAMIN D3) 400 units tablet   No No   Sig: Take 1 tablet (400 Units total) by mouth daily   collagenase (SANTYL) ointment   No No   Sig: Apply topically daily   famotidine (PEPCID) 20 mg tablet   No No   Sig: Take 1 tablet (20 mg total) by mouth daily   ferrous sulfate 325 (65 Fe) mg tablet   No No   Sig: Take 1 tablet (325 mg total) by mouth daily with breakfast   furosemide (LASIX) 40 mg tablet   No No   Sig: Take 1 tablet (40 mg total) by mouth daily   Patient taking differently: Take 40 mg by mouth every other day And and as needed dose   glucose blood (Embrace Blood Glucose Test) test strip   No No   Sig: Use as instructed to check sugar bid   hydrOXYzine HCL (ATARAX) 50 mg tablet   No No   Sig: Take 1 tablet (50 mg total) by mouth daily at bedtime   levothyroxine 100 mcg tablet   No No   Sig: Take 1 tablet (100 mcg total) by mouth daily   metoprolol tartrate (LOPRESSOR) 50 mg tablet   No No   Sig: Take 1 tablet (50 mg total) by mouth 2 (two) times a day   nystatin (MYCOSTATIN) cream   No No   Sig: Apply topically 2 (two) times a day   pantoprazole (PROTONIX) 40 mg tablet   No No   Sig: Take 1 tablet (40 mg total) by mouth daily   sertraline (Zoloft) 50 mg tablet   No No   Sig: Take 1 tablet (50 mg total) by mouth daily   umeclidinium-vilanterol (Anoro Ellipta) 62 5-25 mcg/actuation inhaler   No No   Sig: Inhale 1 puff daily   Patient not taking: Reported on 2/16/2023   warfarin (COUMADIN) 3 mg tablet   No No   Sig: Take 1 tablet (3 mg total) by mouth daily      Facility-Administered Medications: None       Past Medical History:   Diagnosis Date   • Anemia    • Arthritis    • Cancer of kidney (HCC)    • Chronic kidney disease    • Diabetes mellitus (HCC)    • Disease of thyroid gland    • HLD (hyperlipidemia)    • Hypertension    • Ovarian cancer ([facility] [address] )    • Pneumonia        Past Surgical History:   Procedure Laterality Date   • CHOLECYSTECTOMY     • CT GUIDED PERC DRAINAGE CATHETER PLACEMENT  2016   • GALLBLADDER SURGERY  2004   • HYSTERECTOMY  2018   • NEPHRECTOMY Right 2018       Family History   Problem Relation Age of Onset   • No Known Problems Mother    • No Known Problems Father      I have reviewed and agree with the history as documented  E-Cigarette/Vaping   • E-Cigarette Use Never User      E-Cigarette/Vaping Substances   • Nicotine No    • THC Yes    • CBD No    • Flavoring No      Social History     Tobacco Use   • Smoking status: Former     Packs/day: 3 00     Years: 30 00     Pack years: 90 00     Types: Cigarettes     Quit date: 10/22/2010     Years since quittin 3   • Smokeless tobacco: Former     Quit date: 2011   • Tobacco comments:     quit approx  9 years ago   Vaping Use   • Vaping Use: Never used   Substance Use Topics   • Alcohol use: Never     Comment: n/a   • Drug use: Yes     Types: Marijuana     Comment: smokes with girlfriend when anxious       Review of Systems   Constitutional: Negative for appetite change, chills, diaphoresis, fatigue and fever  HENT: Negative for congestion, ear pain, rhinorrhea and sore throat  Eyes: Negative for visual disturbance  Respiratory: Negative for cough, chest tightness, shortness of breath and wheezing  Cardiovascular: Negative for chest pain, palpitations and leg swelling  Gastrointestinal: Negative for abdominal pain, constipation, diarrhea, nausea and vomiting     Musculoskeletal: Negative for myalgias, neck pain and neck stiffness  Skin: Negative for color change, pallor, rash and wound  Neurological: Negative for dizziness, seizures, loss of consciousness, syncope, weakness, light-headedness, numbness and headaches  Psychiatric/Behavioral: Negative for confusion  All other systems reviewed and are negative  Physical Exam  Physical Exam  Vitals and nursing note reviewed  Constitutional:       General: She is not in acute distress  Appearance: Normal appearance  She is normal weight  She is not ill-appearing, toxic-appearing or diaphoretic  HENT:      Head: Normocephalic and atraumatic  Nose: Nose normal  No congestion or rhinorrhea  Mouth/Throat:      Mouth: Mucous membranes are moist       Pharynx: No oropharyngeal exudate or posterior oropharyngeal erythema  Eyes:      General: No scleral icterus  Right eye: No discharge  Left eye: No discharge  Extraocular Movements: Extraocular movements intact  Pupils: Pupils are equal, round, and reactive to light  Cardiovascular:      Rate and Rhythm: Normal rate and regular rhythm  Pulses: Normal pulses  Heart sounds: Normal heart sounds  No murmur heard  No friction rub  No gallop  Pulmonary:      Effort: Pulmonary effort is normal  No respiratory distress  Breath sounds: Normal breath sounds  No stridor  No wheezing, rhonchi or rales  Chest:      Chest wall: No tenderness  Abdominal:      General: Abdomen is flat  There is no distension  Palpations: Abdomen is soft  Tenderness: There is no abdominal tenderness  There is no right CVA tenderness, left CVA tenderness, guarding or rebound  Musculoskeletal:         General: Normal range of motion  Cervical back: Normal range of motion  No tenderness  Right lower leg: No edema  Left lower leg: No edema  Skin:     General: Skin is warm and dry        Capillary Refill: Capillary refill takes less than 2 seconds  Coloration: Skin is not jaundiced or pale  Findings: No bruising, lesion or rash  Neurological:      General: No focal deficit present  Mental Status: She is alert and oriented to person, place, and time  Mental status is at baseline     Psychiatric:         Mood and Affect: Mood normal          Behavior: Behavior normal          Vital Signs  ED Triage Vitals   Temperature Pulse Respirations Blood Pressure SpO2   03/08/23 0823 03/08/23 0822 03/08/23 0822 03/08/23 0822 03/08/23 0822   97 6 °F (36 4 °C) 65 18 132/93 98 %      Temp Source Heart Rate Source Patient Position - Orthostatic VS BP Location FiO2 (%)   03/08/23 0823 03/08/23 0822 -- -- --   Oral Monitor         Pain Score       03/08/23 0819       No Pain           Vitals:    03/08/23 0822   BP: 132/93   Pulse: 65         Visual Acuity      ED Medications  Medications - No data to display    Diagnostic Studies  Results Reviewed     Procedure Component Value Units Date/Time    Protime-INR [[id number]]  (Abnormal) Collected: 03/08/23 0841    Lab Status: Final result Specimen: Blood from Arm, Left Updated: 03/08/23 0912     Protime 72 3 seconds      INR 8 72    APTT [[id number]]  (Abnormal) Collected: 03/08/23 0841    Lab Status: Final result Specimen: Blood from Arm, Left Updated: 03/08/23 0912     PTT 85 seconds     Basic metabolic panel [[id number]]  (Abnormal) Collected: 03/08/23 0841    Lab Status: Final result Specimen: Blood from Arm, Left Updated: 03/08/23 0906     Sodium 137 mmol/L      Potassium 4 4 mmol/L      Chloride 97 mmol/L      CO2 39 mmol/L      ANION GAP 1 mmol/L      BUN 17 mg/dL      Creatinine 1 79 mg/dL      Glucose 97 mg/dL      Calcium 9 2 mg/dL      eGFR 29 ml/min/1 73sq m     Narrative:      [place] guidelines for Chronic Kidney Disease (CKD):   •  Stage 1 with normal or high GFR (GFR > 90 mL/min/1 73 square meters)  •  Stage 2 Mild CKD (GFR = 60-89 mL/min/1 73 square meters)  •  Stage 3A Moderate CKD (GFR = 45-59 mL/min/1 73 square meters)  •  Stage 3B Moderate CKD (GFR = 30-44 mL/min/1 73 square meters)  •  Stage 4 Severe CKD (GFR = 15-29 mL/min/1 73 square meters)  •  Stage 5 End Stage CKD (GFR <15 mL/min/1 73 square meters)  Note: GFR calculation is accurate only with a steady state creatinine    CBC and differential [[id number]]  (Abnormal) Collected: 03/08/23 0841    Lab Status: Final result Specimen: Blood from Arm, Left Updated: 03/08/23 0850     WBC 5 97 Thousand/uL      RBC 3 57 Million/uL      Hemoglobin 10 3 g/dL      Hematocrit 37 0 %       fL      MCH 28 9 pg      MCHC 27 8 g/dL      RDW 15 5 %      MPV 9 6 fL      Platelets 801 Thousands/uL      nRBC 0 /100 WBCs      Neutrophils Relative 78 %      Immat GRANS % 1 %      Lymphocytes Relative 12 %      Monocytes Relative 6 %      Eosinophils Relative 3 %      Basophils Relative 0 %      Neutrophils Absolute 4 62 Thousands/µL      Immature Grans Absolute 0 04 Thousand/uL      Lymphocytes Absolute 0 74 Thousands/µL      Monocytes Absolute 0 38 Thousand/µL      Eosinophils Absolute 0 17 Thousand/µL      Basophils Absolute 0 02 Thousands/µL                  No orders to display              Procedures  Procedures         ED Course  ED Course as of 03/08/23 1503   Wed Mar 08, 2023   0908 Creatinine(!): 1 79  Appears baseline for the patient based upon review of prior labs  0912 POCT INR(!!): 8 72  Patient's INR was most recently 10 4; down-trending on it's own  Will instruct patient to hold Coumadin  The patient has no evidence of bleeding and denies any complaints  Coumadin is not greater-than 10 in which case will not dose with Vitamin K  Dispo planning  Medical Decision Making  This is a 59-year-old female presenting to the emergency department for supratherapeutic INR  Patient's INR was recently subtherapeutic and her PCP recommended that she triple her Coumadin dose    She notes no bleeding  She has no complaints at this time  No hematochezia or melena  Her vital signs are stable  Her physical examination is reassuring  Patient's INR today is 8 72  Given that the patient has no active bleeding and INR is less than 10, will defer vitamin K at this time  Patient's hemoglobin is improved from baseline  She has no evidence of bruising or active bleeding  The patient is stable for discharge at this time  Discontinue use of Coumadin until evaluated by your PCP  Continue following up with your PCP for blood INR redraws  RTER for trauma or signs of bleeding  Strict return precautions were given  Recommend PCP follow-up as soon as possible  The patient and/or patient's proxy verify their understanding and agree to the plan at this time  All questions answered to the patient and/or their proxy's satisfaction  All labs reviewed and utilized in the medical decision making process (if labs were ordered)  Portions of the record may have been created with voice recognition software   Occasional wrong word or "sound a like" substitutions may have occurred due to the inherent limitations of voice recognition software   Read the chart carefully and recognize, using context, where substitutions have occurred  Supratherapeutic INR: complicated acute illness or injury  Amount and/or Complexity of Data Reviewed  External Data Reviewed: notes  Labs: ordered  Decision-making details documented in ED Course  Disposition  Final diagnoses:   Supratherapeutic INR     Time reflects when diagnosis was documented in both MDM as applicable and the Disposition within this note     Time User Action Codes Description Comment    3/8/2023  9:20 AM Laura Zamorano Add [R79 1] Supratherapeutic INR       ED Disposition     ED Disposition   Discharge    Condition   Stable    Date/Time   Wed Mar 8, 2023  9:27 AM    [address] discharge to home/self care                 Follow-up Information     Follow up With Specialties Details Why Contact Info Additional Information    Mary Catherine MD Internal Medicine Schedule an appointment as soon as possible for a visit   [address]  [address] [facility] Emergency Department Emergency Medicine Go to  If symptoms worsen [address] [facility],[address] [id number]  [address] [facility] Emergency Department, [address], [address] [place] [address] Rd          Patient's Medications   Discharge Prescriptions    No medications on file       No discharge procedures on file      PDMP Review       Value Time User    PDMP Reviewed  Yes 2/15/2023  2:27 PM Mary Catherine MD          ED Provider  Electronically Signed by           Cristiano Wiggins PA-C  03/08/23 3437

## 2023-03-08 NOTE — TELEPHONE ENCOUNTER
Hi, this is Marilee Valadez calling from [employer]  My Direct Line here is eight six six eight six four, six three, three, two, extension one five five  And I'm calling regarding mutual patient Juan Soto [facility] date of birth is [birth year] [de-identified]  Miss Billie Waggoner has an appointment tomorrow morning at nine AM with Doctor Jose Alfredo lewis nurse practitioner aCrlitos Mendieta  I'm sorry  I'm trying to reach someone in the office to confirm that you all have received the paperwork that we faxed over for that visit  It is an eleven page fax  It contains instructions and order documents for the patient 's power wheelchair and we faxed them to four eight four, six two two, five six zero four  I need to hear from a nurse or medical assistant that you all have received those documents  And I also need to go over the documents and some specifics that need to be written in the patient 's clinic note, if that's not the right text number, please let me know right away so I can do my part to get their paperwork to you  I am going to resend it right now  If you could please call me again  My name is Marilee Valadez and my number is eight six six eight six four, six three three, two, extension one, five, five  Thank you so much and have a great day

## 2023-03-09 ENCOUNTER — VBI (OUTPATIENT)
Dept: FAMILY MEDICINE CLINIC | Facility: CLINIC | Age: 63
End: 2023-03-09

## 2023-03-09 NOTE — TELEPHONE ENCOUNTER
Wilbert Gaspar    ED Visit Information     Ed visit date: 3-8-23  Diagnosis Description:   Supratherapeutic INR     In Network? [facility]  Discharge status: Home  Discharged with meds ? No  Number of ED visits to date: 3  ED Severity:3     Outreach Information    Outreach successful: Yes 1  Date letter mailed:0  Date Finalized:3-9-23    Care Coordination    Follow up appointment with pcp: yes 3/14/23  Transportation issues ? No    Value [employer] type: 7 Day Outreach  Emergent necessity warranted by diagnosis: Yes  [facility] PCP: Yes  Transportation: Ambulance Transport  Ambulance - Was Pt given choice of of ED: Yes  If able to choose an ED  Would you have chosen [facility]: Yes  Called PCP first?: Yes  Told to go to ED by PCP?: Yes  Same-Day or Next Day Appointment Offered?: No  Would have used same-day or next-day if offered?: No  Feels able to call PCP for urgent problems ?: Yes  Understands what emergencies can be handled by PCP ?: Yes  Ever any problems getting appointment with PCP for minor emergency/urgency problems?: No  Practice Contacted Patient ?: No  Pt had ED follow up with pcp/staff ?: No    Reason Patient went to ED instead of Urgent Care or PCP?: Believes UC is not equipped to treat, PCP instructions  Urgent care Education?: No  There was a personal communication with patient care giver Jadene Schirmer regarding recent ED visit  Jadene Schirmer stated the pcp office called and told patient to go to the ED  Alfonzo Parents stated that the patient Lyudmila Wheeler is doing better  Jadene Schirmer stated a follow up with PCP was already made Follow up appt 3-14-23  Jadene Schirmer is  aware of their nearest [facility] urgent care facility, education not given

## 2023-03-14 ENCOUNTER — TELEPHONE (OUTPATIENT)
Dept: FAMILY MEDICINE CLINIC | Facility: CLINIC | Age: 63
End: 2023-03-14

## 2023-03-14 ENCOUNTER — OFFICE VISIT (OUTPATIENT)
Dept: FAMILY MEDICINE CLINIC | Facility: CLINIC | Age: 63
End: 2023-03-14

## 2023-03-14 VITALS
BODY MASS INDEX: 49.58 KG/M2 | RESPIRATION RATE: 16 BRPM | SYSTOLIC BLOOD PRESSURE: 110 MMHG | DIASTOLIC BLOOD PRESSURE: 70 MMHG | HEIGHT: 64 IN | WEIGHT: 290.4 LBS | TEMPERATURE: 98.6 F

## 2023-03-14 DIAGNOSIS — Z91.81 HISTORY OF FALL: ICD-10-CM

## 2023-03-14 DIAGNOSIS — G62.9 POLYNEUROPATHY: ICD-10-CM

## 2023-03-14 DIAGNOSIS — R26.2 AMBULATORY DYSFUNCTION: ICD-10-CM

## 2023-03-14 DIAGNOSIS — M62.81 MUSCLE WEAKNESS: ICD-10-CM

## 2023-03-14 DIAGNOSIS — I48.0 PAROXYSMAL ATRIAL FIBRILLATION (HCC): Chronic | ICD-10-CM

## 2023-03-14 DIAGNOSIS — E66.01 MORBID OBESITY WITH BMI OF 50.0-59.9, ADULT (HCC): ICD-10-CM

## 2023-03-14 DIAGNOSIS — Z99.3 WHEELCHAIR DEPENDENCE: Primary | ICD-10-CM

## 2023-03-14 DIAGNOSIS — Z99.81 OXYGEN DEPENDENT: ICD-10-CM

## 2023-03-14 DIAGNOSIS — Z91.81 AT HIGH RISK FOR FALLS: ICD-10-CM

## 2023-03-14 DIAGNOSIS — E11.42 DIABETIC POLYNEUROPATHY ASSOCIATED WITH TYPE 2 DIABETES MELLITUS (HCC): ICD-10-CM

## 2023-03-14 NOTE — TELEPHONE ENCOUNTER
Received fax from [facility] for Medicare part B for Embrace Gene test strips   Completed and faxed to [phone]

## 2023-03-14 NOTE — PROGRESS NOTES
Assessment/Plan:    1  Wheelchair dependence    2  Diabetic polyneuropathy associated with type 2 diabetes mellitus (HCC)    3  Paroxysmal atrial fibrillation ([facility] )    4  Morbid obesity with BMI of 50 0-59 9, adult ([facility] )    5  Polyneuropathy    6  Muscle weakness    7  Ambulatory dysfunction    8  Oxygen dependent    9  History of fall    10  At high risk for falls        The case discussed with patient using patient centered shared decision making  The patient was counseled regarding instructions for management,-- risk factor reductions,-- prognosis,-- impressions,-- risks and benefits of treatment options,-- importance of compliance with treatment  I have reviewed the instructions with the patient, answering all questions to her satisfaction  57 yo female with significant history of diabetes, polyneuropathy, chronic respiratory failure/copd, oxygen dependence, permanent Afib, CKD stage 4, anemia, morbid obesity meets criteria for medically necessary Motorized wheelchair due to the following     Polyneuropathy, severe osteoarthritis, chronic respiratory failure, morbid obesity contribute to mobility limitations which thereby significantly impair participation in mobility related activities of daily living including but not limited to toileting, dressing, grooming, bathing  She has very limited endurance and muscle strength  Her mobility is limited to standing and pivoting with the assistance of one caregiver to commode or bed  She has h/o falls and is at high risk of future falls d/t decreased sensation in lower extremities and decreased muscle strength  She is unable to safely use a cane, walker, or self propelled wheelchair  She has chronic lower extremity edema, PVD and requires elevated leg rests/platform to reduce risk of pressure ulcers, infection in dependant extremities  There are no Patient Instructions on file for this visit  No follow-ups on file    I have spent a total time of 40 minutes on 03/14/23 in caring for this patient including Prognosis, Risks and benefits of tx options, Instructions for management, Patient and family education, Risk factor reductions, Impressions, Counseling / Coordination of care, Documenting in the medical record, Reviewing / ordering tests, medicine, procedures   and Obtaining or reviewing history    Subjective:      Patient ID: Hakan Myers is a 58 y o  female  Chief Complaint   Patient presents with   • evaluation for mobility      Evaluation for mobility power chair       57 yo female with significant history of diabetes, polyneuropathy, chronic respiratory failure/copd, oxygen dependence, permanent Afib, CKD stage 4, anemia, morbid obesity presents in office for power wheel chair evaluation  She is due for replacement    History obtained from chart review and the patient  Polyneuropathy, severe osteoarthritis, chronic respiratory failure, morbid obesity contribute to mobility limitations which thereby significantly impair participation in mobility related activities of daily living including but not limited to toileting, dressing, grooming, bathing  She has very limited endurance and muscle strength  Her mobility is limited to standing and pivoting with the assistance of one caregiver to commode or bed  She has h/o falls and is at high risk of future falls d/t decreased sensation in lower extremities and decreased muscle strength  She is unable to safely use a cane, walker, or self propelled wheelchair  She has chronic lower extremity edema, PVD and requires elevated leg rests/platform to reduce risk of pressure ulcers, infection in dependant extremities           The following portions of the patient's history were reviewed and updated as appropriate: allergies, current medications, past family history, past medical history, past social history, past surgical history and problem list     Review of Systems   Constitutional: Positive for fatigue  Negative for fever and unexpected weight change  HENT: Negative for trouble swallowing  Respiratory: Positive for shortness of breath  Negative for cough  Oxygen dependance   Cardiovascular: Positive for leg swelling  Negative for chest pain and palpitations  Gastrointestinal: Negative for abdominal pain  Genitourinary: Negative for difficulty urinating  Musculoskeletal: Positive for arthralgias, back pain, gait problem, joint swelling, myalgias and neck pain  Skin: Negative for pallor and wound  Neurological: Positive for weakness  Negative for dizziness, syncope and headaches           Current Outpatient Medications   Medication Sig Dispense Refill   • albuterol (2 5 mg/3 mL) 0 083 % nebulizer solution INHALE CONTENTS OF 1 VIAL ( 3 MILLILITERS ) IN NEBULIZER BY MOUTH AND INTO THE LUNGS EVERY 6 HOURS IF NEEDED FOR WHEEZING OR SHORTNESS OF BREATH 75 mL 5   • albuterol (PROVENTIL HFA,VENTOLIN HFA) 90 mcg/act inhaler inhale 2 puffs every 4 hours if needed for wheezing     • amLODIPine (NORVASC) 5 mg tablet Take 1 tablet (5 mg total) by mouth daily 90 tablet 2   • atorvastatin (LIPITOR) 10 mg tablet Take 1 tablet (10 mg total) by mouth daily 90 tablet 3   • cholecalciferol (VITAMIN D3) 400 units tablet Take 1 tablet (400 Units total) by mouth daily 100 tablet 4   • collagenase (SANTYL) ointment Apply topically daily 15 g 1   • famotidine (PEPCID) 20 mg tablet Take 1 tablet (20 mg total) by mouth daily 90 tablet 3   • ferrous sulfate 325 (65 Fe) mg tablet Take 1 tablet (325 mg total) by mouth daily with breakfast 90 tablet 3   • furosemide (LASIX) 40 mg tablet Take 1 tablet (40 mg total) by mouth daily (Patient taking differently: Take 40 mg by mouth every other day And and as needed dose) 90 tablet 3   • hydrOXYzine HCL (ATARAX) 50 mg tablet Take 1 tablet (50 mg total) by mouth daily at bedtime 30 tablet 12   • levothyroxine 100 mcg tablet Take 1 tablet (100 mcg total) by mouth daily 90 tablet 3   • LORazepam (ATIVAN) 0 5 mg tablet Take 1 tablet (0 5 mg total) by mouth 2 (two) times a day as needed for anxiety 60 tablet 4   • metoprolol tartrate (LOPRESSOR) 50 mg tablet Take 1 tablet (50 mg total) by mouth 2 (two) times a day 180 tablet 3   • nystatin (MYCOSTATIN) cream Apply topically 2 (two) times a day 30 g 3   • pantoprazole (PROTONIX) 40 mg tablet Take 1 tablet (40 mg total) by mouth daily 90 tablet 1   • sertraline (Zoloft) 50 mg tablet Take 1 tablet (50 mg total) by mouth daily 30 tablet 5   • warfarin (COUMADIN) 3 mg tablet Take 1 tablet (3 mg total) by mouth daily 90 tablet 3   • Blood Glucose Monitoring Suppl (SurIDx Pro Glucose Meter) SAVANNAH Use 2 (two) times a day SurIDx glucometer, test twice daily 1 each 0   • DubMeNowace Lancets Ultra Thin 30G MISC Use 2 (two) times a day 100 each 5   • glucose blood (SurIDx Blood Glucose Test) test strip Use as instructed to check sugar bid 100 strip 4   • umeclidinium-vilanterol (Anoro Ellipta) 62 5-25 mcg/actuation inhaler Inhale 1 puff daily (Patient not taking: Reported on 2/16/2023) 60 blister 5     No current facility-administered medications for this visit  Objective:    /70 (BP Location: Right arm, Patient Position: Sitting, Cuff Size: Standard)   Temp 98 6 °F (37 °C) (Temporal)   Resp 16   Ht 5' 4" (1 626 m)   Wt 132 kg (290 lb 6 4 oz)   BMI 49 85 kg/m²        Physical Exam  Vitals and nursing note reviewed  Constitutional:       Appearance: Normal appearance  She is obese  Comments: Chronically ill female presents in motorized wheelchair  She is using oxygen 3 lpm continuously     HENT:      Head: Normocephalic and atraumatic  Cardiovascular:      Rate and Rhythm: Normal rate  Rhythm irregular  Pulses:           Radial pulses are 2+ on the right side and 2+ on the left side  Posterior tibial pulses are 1+ on the right side and 1+ on the left side        Comments: Distant heart tones due to body habitus  Pulmonary:      Effort: Pulmonary effort is normal       Comments: Distant breath sounds due to body habitus  Abdominal:      Palpations: Abdomen is soft  Tenderness: There is no abdominal tenderness  Musculoskeletal:      Right lower le+ Pitting Edema present  Left lower le+ Pitting Edema present  Lymphadenopathy:      Cervical: No cervical adenopathy  Skin:     Comments: Lower extremities with pvd   Skin intact  No signs of cellulitis    Neurological:      General: No focal deficit present  Mental Status: She is alert  Sensory: Sensory deficit present  Motor: Weakness present        Gait: Gait abnormal       Comments: Unable to stand   Wheel chair bound   Psychiatric:         Mood and Affect: Mood normal                 Lenny Hong, [address]

## 2023-03-15 ENCOUNTER — TELEPHONE (OUTPATIENT)
Dept: FAMILY MEDICINE CLINIC | Facility: CLINIC | Age: 63
End: 2023-03-15

## 2023-03-15 LAB
ERYTHROCYTE [DISTWIDTH] IN BLOOD BY AUTOMATED COUNT: 13.9 % (ref 11–15)
HCT VFR BLD AUTO: 28.1 % (ref 35–45)
HGB BLD-MCNC: 8.7 G/DL (ref 11.7–15.5)
MCH RBC QN AUTO: 29 PG (ref 27–33)
MCHC RBC AUTO-ENTMCNC: 31 G/DL (ref 32–36)
MCV RBC AUTO: 93.7 FL (ref 80–100)
PLATELET # BLD AUTO: 294 THOUSAND/UL (ref 140–400)
PMV BLD REES-ECKER: 10 FL (ref 7.5–12.5)
RBC # BLD AUTO: 3 MILLION/UL (ref 3.8–5.1)
WBC # BLD AUTO: 6.6 THOUSAND/UL (ref 3.8–10.8)

## 2023-03-15 NOTE — TELEPHONE ENCOUNTER
During 3/14/23 ov pt admitted that she has not had INR checked since ED visit for INR 10 on 3/8/23  I advised of risks of supratherapeutic inr--bleeding risks  Her significant other became beligerant-"Its the office's resposnsibity to arrange and see that INRs are drawn at patient's house" I countered her and advised that ultimately it is the patient's responsibility to follow treatment plan asn see to their health needs> We are available to help with needs   She was unwilling to accept responsibility "I expect the office to arranfe home draw with quest" My clinical staff faxed script to fay

## 2023-03-16 ENCOUNTER — ANTICOAG VISIT (OUTPATIENT)
Dept: FAMILY MEDICINE CLINIC | Facility: CLINIC | Age: 63
End: 2023-03-16

## 2023-03-16 ENCOUNTER — TELEPHONE (OUTPATIENT)
Dept: FAMILY MEDICINE CLINIC | Facility: CLINIC | Age: 63
End: 2023-03-16

## 2023-03-16 NOTE — TELEPHONE ENCOUNTER
Please advise hold coumadin blane-sat  Confirm that she was taking warfarin 3 mg daily   If so, I would like her to take 1/2 tab daily with INR on mon 3/27    If she is taking different dose let me know

## 2023-03-16 NOTE — TELEPHONE ENCOUNTER
Spoke with patients caregiver about INR results and is aware and had no further questions  Patients caregiver stated if you can review her CBC test she had done ?

## 2023-03-16 NOTE — PROGRESS NOTES
Spoke with patients caregiver in regards for her INR and to hold for three days, and after take 1/2 mg daily recheck 1 week 3/27  Patients caregiver verbalized understanding

## 2023-03-20 ENCOUNTER — TELEPHONE (OUTPATIENT)
Dept: FAMILY MEDICINE CLINIC | Facility: CLINIC | Age: 63
End: 2023-03-20

## 2023-03-20 DIAGNOSIS — Z79.4 TYPE 2 DIABETES MELLITUS WITHOUT COMPLICATION, WITH LONG-TERM CURRENT USE OF INSULIN (HCC): ICD-10-CM

## 2023-03-20 DIAGNOSIS — E11.9 TYPE 2 DIABETES MELLITUS WITHOUT COMPLICATION, WITH LONG-TERM CURRENT USE OF INSULIN (HCC): ICD-10-CM

## 2023-03-20 RX ORDER — BLOOD-GLUCOSE METER
EACH MISCELLANEOUS
Qty: 100 STRIP | Refills: 4 | Status: SHIPPED | OUTPATIENT
Start: 2023-03-20

## 2023-03-20 RX ORDER — BLOOD-GLUCOSE METER
EACH MISCELLANEOUS 2 TIMES DAILY
Qty: 100 EACH | Refills: 5 | Status: SHIPPED | OUTPATIENT
Start: 2023-03-20

## 2023-03-20 NOTE — TELEPHONE ENCOUNTER
patient needs a script sent to [facility] for the oxygen tanks because she shallow breathes ?   [facility] is going to  the portable oxygen machine     And change it for the tanks ?

## 2023-03-20 NOTE — TELEPHONE ENCOUNTER
Renée Bates called [facility] said they did not get the script for the lancets or the strips  Patient is out and needs this asap  [facility] call [facility]       [facility] a [facility] she shallow breathes ?   [facility] is going to  the portable oxygen machine     And change it for the tanks ?

## 2023-03-21 DIAGNOSIS — J44.9 COPD WITH ASTHMA (HCC): Primary | Chronic | ICD-10-CM

## 2023-03-21 NOTE — PROGRESS NOTES
Oxygen tanks requested from the DME provider  It is reported that this patient uses oxygen along with her electric wheelchair  Last seen by the pulmonary service in the hospital in December 2022  Patient has never been seen in this pulmonary office

## 2023-03-21 NOTE — TELEPHONE ENCOUNTER
Jovon Alston, patient was not seen by this office  She was seen in the hospital in Dec by the PA  Dr Guanako Ahmadi has placed an order for O2, but not sure it will get approved with notes older then 3 months  Please direct this to Annamaria Gu next time so we can look into or at least get the patient scheduled  Thank you  @Adriana, Please review and submit order for O2 with Dec hospital note as supporting documentation

## 2023-03-22 ENCOUNTER — TELEPHONE (OUTPATIENT)
Dept: FAMILY MEDICINE CLINIC | Facility: CLINIC | Age: 63
End: 2023-03-22

## 2023-03-22 NOTE — TELEPHONE ENCOUNTER
I spoke with Carlos Phillips, she stated the paper work was already received by the company  Nothing else is needed

## 2023-03-22 NOTE — TELEPHONE ENCOUNTER
Pt spouse called regarding paperwork that she brought into the office at last OV orin [place]  This paperwork was in regards to electric power wheelchair  Pt states she needs this chair to get to the Dr Ofelia Romo     Pt spouse gave two numbers for E  Wheelchair supplier, but that the number listed should also be on the paperwork;   Phone Number: [phone]  Phone Number: [phone]

## 2023-03-23 ENCOUNTER — TELEPHONE (OUTPATIENT)
Dept: PULMONOLOGY | Facility: CLINIC | Age: 63
End: 2023-03-23

## 2023-03-23 NOTE — TELEPHONE ENCOUNTER
Rachele from [facility] called to let us know the patient would like POC script to go to [facility], script faxed with hospital notes

## 2023-03-29 ENCOUNTER — TELEPHONE (OUTPATIENT)
Dept: FAMILY MEDICINE CLINIC | Facility: CLINIC | Age: 63
End: 2023-03-29

## 2023-03-29 ENCOUNTER — TELEPHONE (OUTPATIENT)
Dept: GASTROENTEROLOGY | Facility: CLINIC | Age: 63
End: 2023-03-29

## 2023-03-29 NOTE — TELEPHONE ENCOUNTER
[facility]  Office visit  Harpreet Ceballos  1942    There is no work phone number on file.    VISIT DATE:  6/9/2021      PCP: Provider, No Known  [facility] [address]    CC:  Chief Complaint   Patient presents with   • Coronary artery disease involving native coronary artery of        Previous cardiac studies and procedures:  August 2018  Echo  · Calculated EF = 50%. Estimated EF appears to be in the range of 51 - 55%.  · Left ventricular wall thickness is consistent with mild concentric hypertrophy.  · Mild to moderate aortic valve regurgitation is present.  · Mild dilation of the ascending aorta is present. 4 cm.  Myocardial perfusion imaging  · REST EF = 38% STRESS EF = 34%.  · Left ventricular ejection fraction is moderately reduced and severely dilated.  · Moderate size region of moderate ischemia involving the anterior wall and apex.  · Large region of ischemia involving the entire lateral wall, significantly decreased at rest well potentially consistent for underlying infarction versus hibernating myocardium  · Impressions are consistent with a high risk study. Concerning for multivessel disease.  Cardiac catheterization  · LM: Dilated as a continuation of dilated sinuses of Valsalva.    · LAD: 70% ostial stenosis, 60% proximal stenosis and a long 80% midsegment stenosis.  The distal vessel has diffuse plaque.  A medium size first diagonal has 90% stenosis.  · LCX: Nondominant vessel with 70% proximal stenosis and total mid segment occlusion.  A major obtuse marginal branch is also totally occluded in its midsegment.  The lateral filling of the obtuse marginal and the distal circumflex is seen via left-sided collaterals.  · RCA: Dominant vessel with a 90% distal stenosis.  · Three-vessel obstructive coronary disease.  CT angio chest  dilatation of the aortic root measuring up to 4.3 cm   CTA neck  Atherosclerotic involvement of the bilateral carotid bulbs  Good morning  This is Manfred Diaz with [employer]  I'm trying to reach [facility]'s office in regards to a mutual patient by the name of Amol Brothers  Her date of birth is [phone] and I refaced a master prescription form and a declaration statement form for the patients power wheelchair yesterday  It looks like it was faxed to the [phone] [facility] fax number and I just wanted to make sure that you guys received it is the two final documents needed to complete the order  Please give us a call back at [phone]  The extension is [phone]  Thank you  far greater  on the right with approximately 80% luminal narrowing by NASCET  Criteria.    September 2018   -coronary bypass grafting: LIMA to first diagonal and left anterior descending artery, vein graft to obtuse marginal one and obtuse marginal 2, vein graft to right posterior descending artery.    March 2019 echo  · Estimated EF appears to be in the range of 56 - 60%.  · Mild dilation of the sinuses of Valsalva, 4cm. Mild dilation of the ascending aorta, 4.3cm.  · Mild aortic valve regurgitation is present.  · Elevated left atrial pressure.    December 2019 carotid duplex  · Right internal carotid artery stenosis of 50-69%.  · Proximal left internal carotid artery plaque without significant stenosis.  · Left internal carotid artery stenosis of 0-49%.    April 2021  Carotid duplex imaging  · Proximal right internal carotid artery plaque without significant stenosis.  · Right internal carotid artery stenosis of 0-49%.  · Proximal left internal carotid artery plaque without significant stenosis.  · Left internal carotid artery stenosis of 0-49%.  Transthoracic echocardiogram  · Calculated left ventricular EF = 33% Estimated left ventricular EF was in agreement with the calculated left ventricular EF. Left ventricular systolic function is moderately decreased.  · Left ventricular diastolic function is consistent with (grade I) impaired relaxation.  · Left ventricular wall thickness is consistent with moderate concentric hypertrophy.  · Left atrial volume is mildly increased.  · Moderate dilation of the sinuses of Valsalva is present. Mild dilation of the ascending aorta is present.  · Moderate dilation of the sinuses of Valsalva is present. Sinus of Valsalva = 4.9 cm  · Mild dilation of the ascending aorta is present. Ascending aorta = 4.0 cm  · Mild aortic valve regurgitation    May 2021 Liz scan myocardial perfusion imaging  · Attenuation corrected images show mild-moderate fixed defects in the mid-apical  anteroseptal wall, mid-apical lateral wall, and apex. No reversible ischemic defects (SDS = 0)  · The LV appears dilated at both rest and stress. Normal TID ratio 1.02  · Left ventricular ejection fraction is moderately reduced. (Calculated EF = 36%).  · Impression: Intermediate risk study secondary to reduced LV function EF 36%. Fixed anteroseptal, lateral, apical defects. No significant ischemia noted.    ASSESSMENT:   Diagnosis Plan   1. Coronary artery disease involving native coronary artery of native heart without angina pectoris     2. Carotid stenosis, asymptomatic, bilateral     3. Essential hypertension     4. Mixed hyperlipidemia         PLAN:  Coronary artery disease: Stable and asymptomatic.  Continue aspirin.    Hyperlipidemia: Currently with excellent control.  Goal LDL less than 70.  Awaiting retrial of statin therapy until medical therapy for cardiomyopathy has been optimized, previously sensitive to medical therapy.    Carotid stenosis, bilateral: Right-sided TIA in December.   Stable on carotid duplex.  Continue aspirin.    Ascending aortic aneurysm: Goal blood pressure less than 130/80 mmHg.  Continue current medical therapy.  Adding beta-blockade.  Continue imaging surveillance on an annual basis.    Cardiomyopathy, ischemic: Moderately decreased EF.  Gradually titrating medical therapy.  Adding Entresto 24-26 mg p.o. twice daily.  Continue bisoprolol.    Subjective  Presented with left hand weakness to [facility] in December 2019.  CTA revealed a 50% right internal carotid artery stenosis, occluded right vertebral artery, and mild to moderate iCAD.   Has been taking an aspirin a day.  Previously stopped taking Plavix because he felt like it was raising his diastolic blood pressure.  Home blood pressures running lower than 120/80 mmHg.  Shortness of breath or chest discomfort.    PHYSICAL EXAMINATION:  Vitals:    06/09/21 1033   BP: 160/78   BP Location: Left arm   Patient Position:  "Sitting   Cuff Size: Adult   Pulse: 59   Temp: 97.5 °F (36.4 °C)   SpO2: 98%   Weight: 85.7 kg (189 lb)   Height: 170.2 cm (67\")     General Appearance:    Alert, cooperative, no distress, appears stated age   Head:    Normocephalic, without obvious abnormality, atraumatic   Eyes:    conjunctiva/corneas clear   Nose:   Nares normal, septum midline, mucosa normal, no drainage   Throat:   Lips, teeth and gums normal   Neck:   Supple, symmetrical, trachea midline, no carotid    bruit or JVD   Lungs:     Clear to auscultation bilaterally, respirations unlabored   Chest Wall:    No tenderness or deformity    Heart:   Regular rate and rhythm, no murmurs rubs gallops, normal S1-S2.  Bilateral carotid bruits.   Abdomen:     Soft, non-tender   Extremities:   Extremities normal, atraumatic, no cyanosis or edema   Pulses:   2+ and symmetric all extremities   Skin:   Skin color, texture, turgor normal, no rashes or lesions       Diagnostic Data:  Procedures  Lab Results   Component Value Date    TRIG 216 (H) 09/10/2020    HDL 43 09/10/2020     Lab Results   Component Value Date    GLUCOSE 93 09/10/2020    BUN 18 09/10/2020    CREATININE 1.09 09/10/2020     09/10/2020    K 4.2 09/10/2020     09/10/2020    CO2 20.6 (L) 09/10/2020     No results found for: HGBA1C  Lab Results   Component Value Date    WBC 6.16 08/26/2018    HGB 15.8 08/26/2018    HCT 46.9 08/26/2018     08/26/2018       Allergies  No Known Allergies    Current Medications    Current Outpatient Medications:   •  aspirin 81 MG EC tablet, Take 1 tablet by mouth Daily., Disp: , Rfl:   •  bisoprolol (ZEBeta) 5 MG tablet, Take 1 tablet by mouth Daily., Disp: 30 tablet, Rfl: 5  •  TAMSULOSIN HCL PO, 2 capsules Daily., Disp: , Rfl:   •  sacubitril-valsartan (Entresto) 24-26 MG tablet, Take 1 tablet by mouth 2 (Two) Times a Day., Disp: 60 tablet, Rfl: 5          ROS  Review of Systems   Cardiovascular: Positive for irregular heartbeat. Negative for chest " pain, dyspnea on exertion, leg swelling, near-syncope and palpitations.   Respiratory: Negative for cough, shortness of breath and sputum production.        SOCIAL HX  Social History     Socioeconomic History   • Marital status:      Spouse name: Not on file   • Number of children: Not on file   • Years of education: Not on file   • Highest education level: Not on file   Tobacco Use   • Smoking status: Never Smoker   • Smokeless tobacco: Never Used   Substance and Sexual Activity   • Alcohol use: Yes     Alcohol/week: 1.0 standard drinks     Types: 1 Cans of beer per week     Comment: A BEER A DAY   • Drug use: No   • Sexual activity: Defer       FAMILY HX  Family History   Problem Relation Age of Onset   • No Known Problems Mother    • No Known Problems Father              Oscar Anderson III, MD, FACC

## 2023-03-31 ENCOUNTER — TELEPHONE (OUTPATIENT)
Dept: OTHER | Facility: OTHER | Age: 63
End: 2023-03-31

## 2023-03-31 ENCOUNTER — TELEPHONE (OUTPATIENT)
Dept: FAMILY MEDICINE CLINIC | Facility: CLINIC | Age: 63
End: 2023-03-31

## 2023-03-31 ENCOUNTER — TELEPHONE (OUTPATIENT)
Dept: HEMATOLOGY ONCOLOGY | Facility: CLINIC | Age: 63
End: 2023-03-31

## 2023-03-31 DIAGNOSIS — Z79.01 WARFARIN ANTICOAGULATION: ICD-10-CM

## 2023-03-31 DIAGNOSIS — R79.1 SUPRATHERAPEUTIC INTERNATIONAL NORMALIZED RATIO (INR): ICD-10-CM

## 2023-03-31 DIAGNOSIS — D68.9 COAGULOPATHY (HCC): Primary | ICD-10-CM

## 2023-03-31 NOTE — TELEPHONE ENCOUNTER
Spoke with the patient caregiver in regards for her INR and to hold coumadin this weekend and Monday and to recheck inr on Monday  I also stated patients caregiver patient will need to see a hematologist and patients caregiver shaun refused the appointment because pts wheelchair needs to come in first then she can make an appointment when she feels ready  Will there be a referral placed to see one ?

## 2023-03-31 NOTE — TELEPHONE ENCOUNTER
Chart review continued difficulty managing coumadin dose  INR repeatedly supratherapeutic  ? ? non compliance    Will lower dose once ascertain INR on Monday  Advised Heme referral    Pt advised by staff to go to ER if bleeding, bruising

## 2023-03-31 NOTE — TELEPHONE ENCOUNTER
Made a attempt to schedule a new patient appointment, Patients caregiver states one the patient receives her new chair with in about a month the patient will be ready to schedule a appointment  Kwame Pozo states that the patient will call back when she is ready to schedule a appointment

## 2023-03-31 NOTE — TELEPHONE ENCOUNTER
This message was already addressed and will placed patient referral in mail  Patients caregiver again said she will call the hematologist when patients wheelchair comes in the mail but will not make an appointment/follow up at this time

## 2023-03-31 NOTE — TELEPHONE ENCOUNTER
Lab Result: Critical Result: INR is 8 2 and PT high at 72 4 Confirmed by repeat analysis   Date/Time Drawn: 03-30-23 at 1109   Ordering Provider: Dr Mustapha Shetty Name: [phone]/[id number] from [address] [place] [address]  Ref # [id number]       The following critical/stat result was read back to the lab as stated above and [employer] to the on-call provider  The provider confirmed receipt of the message

## 2023-03-31 NOTE — TELEPHONE ENCOUNTER
Received message from [employer] with a critical INR of 8 2 and PT of 72 4  Lab was drawn at 11:09am on 3/30/23 and resulted at 2am 3/31/23 at [facility]     In reviewing this patient's chart, she is wheelchair bound with multiple comorbidities and dependent upon a caretaker  Had ER visit on 3/8/23 for a critical INR over 10  Repeat INR in ER was 8 7 and no action was deemed necessary (no need for Vitamin K at that time) as the patient was not having any spontaneous bleeding  Prior INR of 6 2 on 3/16/23 on coumadin 3mg and caregiver was instructed to reduce dose to 2 5mg and have repeat INR performed on 3/27/23  At this time, I do not feel the need to arouse patient/caregiver at 3am and will leave message for PCP/staff to address first thing in the morning  Most definitely will need coumadin hold, reduction in dosage and would suggest a much shorter interval for repeat INR testing until INR in a therapeutic range  May even benefit from having a home INR machine as [facility] seems to run an INR drawn in early morning the preceding day at a ridiculously early time in the morning when specimen is received (same was done when patient was sent to ED on 3/8)

## 2023-03-31 NOTE — TELEPHONE ENCOUNTER
Patient's INR seems to be very labile   I recommend referral to Heme for eval, anticoagulation recommendations    Referral in chart    Hold warfarin today-Sunday  Stat INR on monday

## 2023-04-05 ENCOUNTER — TELEPHONE (OUTPATIENT)
Dept: FAMILY MEDICINE CLINIC | Facility: CLINIC | Age: 63
End: 2023-04-05

## 2023-04-05 LAB — INR PPP: 4 (ref 0.84–1.19)

## 2023-04-05 NOTE — TELEPHONE ENCOUNTER
Called and left message on [employer] for Jake Cid (housecall advocate) [phone]  Need to find out why INR has not been drawn on 4/03/2023

## 2023-04-05 NOTE — TELEPHONE ENCOUNTER
Patient was supposed to have INR on Monday  Please call to find out if it was drawn   It was critically high last week

## 2023-04-05 NOTE — TELEPHONE ENCOUNTER
Celestino Palomares called from [address] [facility] regarding paperwork for an electric wheelchair for helga de la cruz --would like documentation refaxed to [phone]

## 2023-04-06 ENCOUNTER — ANTICOAG VISIT (OUTPATIENT)
Dept: FAMILY MEDICINE CLINIC | Facility: CLINIC | Age: 63
End: 2023-04-06

## 2023-04-06 ENCOUNTER — TELEPHONE (OUTPATIENT)
Dept: FAMILY MEDICINE CLINIC | Facility: CLINIC | Age: 63
End: 2023-04-06

## 2023-04-06 DIAGNOSIS — D68.9 COAGULOPATHY (HCC): ICD-10-CM

## 2023-04-06 DIAGNOSIS — Z79.01 WARFARIN ANTICOAGULATION: Primary | ICD-10-CM

## 2023-04-06 DIAGNOSIS — I48.0 PAROXYSMAL ATRIAL FIBRILLATION (HCC): ICD-10-CM

## 2023-04-06 LAB
INR PPP: 4
PROTHROMBIN TIME: 37 SEC (ref 9–11.5)

## 2023-04-06 RX ORDER — WARFARIN SODIUM 1 MG/1
1 TABLET ORAL
Qty: 30 TABLET | Refills: 2 | Status: SHIPPED | OUTPATIENT
Start: 2023-04-06

## 2023-04-06 NOTE — PROGRESS NOTES
Called and spoke to Constanza(significant other) advised to take 1 mg daily except on Friday 3 mg recheck INR 1 week

## 2023-04-06 NOTE — TELEPHONE ENCOUNTER
Called and spoke to Constanza(significant other), inquired about why [facility] did not do blood draw, Haider Shelby states was drawn on 4/05/2023, results in and forward to [facility] for review on coumadin instructions

## 2023-04-06 NOTE — TELEPHONE ENCOUNTER
Mima, my name is Arleth  I'm calling from [employer] the power mobility department  I am calling regarding a mutual patient for the nurse practitioner Dilcia Or  The patient name is Darlene Almanza birthday [id number]  We are working on the electric wheelchair order for the patient  I need to get a the progress notes on March 14, 2023, March 142023  Our fax number is [phone]  Please fax it as soon as possible  We need the we need it to be able to move forward and submit it to insurance  My callback number is [phone]  My direct extension is [phone]  Thank you and have a great day   Bytessa

## 2023-04-06 NOTE — TELEPHONE ENCOUNTER
Called and spoke to Constanza(significant other) advised to take 1 mg daily except Fridays 3 mg advised 1 mg of coumadin was sent to pharmacy for fill  States did not receive yet, instructed to cut a 3 mg in half for 4/6/2023 and then take 3 mg 04/7//2023 recheck INR in 1 week  Advised with Chaitanya Gilman about cutting 3 mg in half for 4/6/2023 and is ok with this approach

## 2023-04-06 NOTE — TELEPHONE ENCOUNTER
4 0 better for INR    RX for 1 mg tabs sent to pharm   I would like her to do 1 mg daily, 3 mg on fridays   INR in 1 week

## 2023-04-06 NOTE — TELEPHONE ENCOUNTER
Please call patient  INR still not received  I am not certain that it was even drawn    Pt had critically elevated INR last week  Coumadin held until Monday  I would like her to take 1 mg daily and have INR ASAP  Patient's Failure to follow instructions and monitor blood levels (INR) places her at risk for adverse events associated with blood thinner and associated medical conditions and resulting  in stroke, hemorrhage, or death if blood too thick or too thin  Additionally please schedule virtual visit to discuss other options-eliquis, home PT/INR monitoring

## 2023-04-07 DIAGNOSIS — Z79.4 TYPE 2 DIABETES MELLITUS WITHOUT COMPLICATION, WITH LONG-TERM CURRENT USE OF INSULIN (HCC): ICD-10-CM

## 2023-04-07 DIAGNOSIS — E11.9 TYPE 2 DIABETES MELLITUS WITHOUT COMPLICATION, WITH LONG-TERM CURRENT USE OF INSULIN (HCC): ICD-10-CM

## 2023-04-07 RX ORDER — BLOOD-GLUCOSE METER
EACH MISCELLANEOUS
Qty: 100 STRIP | Refills: 4 | Status: SHIPPED | OUTPATIENT
Start: 2023-04-07

## 2023-04-25 LAB — INR PPP: 1.6 (ref 0.84–1.19)

## 2023-04-26 ENCOUNTER — TELEPHONE (OUTPATIENT)
Dept: FAMILY MEDICINE CLINIC | Facility: CLINIC | Age: 63
End: 2023-04-26

## 2023-04-26 ENCOUNTER — ANTICOAG VISIT (OUTPATIENT)
Dept: FAMILY MEDICINE CLINIC | Facility: CLINIC | Age: 63
End: 2023-04-26

## 2023-04-26 LAB
INR PPP: 1.6
PROTHROMBIN TIME: 16.1 SEC (ref 9–11.5)

## 2023-04-26 NOTE — PROGRESS NOTES
Called and spoke to Constanza(significant other) and patient advised to take 3 mg on Wed and Fri and 1 mg on other days and recheck INR in 2 weeks

## 2023-04-26 NOTE — TELEPHONE ENCOUNTER
Called and spoke to Constanza(significant other) and patient advised to take 3 mg on Wed and Friday and 1 mg other days   And recheck INR in 2 weeks

## 2023-04-26 NOTE — TELEPHONE ENCOUNTER
----- Message from Christian Servin, [address] sent at 4/26/2023  1:09 PM EDT -----  INR reviewed  3 mg on wednesdays and fridays  1mg other days  INR in 2 weeks

## 2023-04-27 ENCOUNTER — TELEPHONE (OUTPATIENT)
Dept: FAMILY MEDICINE CLINIC | Facility: CLINIC | Age: 63
End: 2023-04-27

## 2023-04-27 NOTE — TELEPHONE ENCOUNTER
Nitza Sotelo of [facility] called from # [phone], and he stated he needed a Face to Face conversation with our Medical Staff and some additional documentation for a Wheel chair that was ordered for the patient  He was a bit difficult to understand

## 2023-04-28 NOTE — TELEPHONE ENCOUNTER
Called and spoke to Stacia Villa, states needs a date changed on face to face encounter and wanted to know if we received inform we did not and will refax, form was faxed, corrected and faxed back along with office note from 3/14/2023   Scanned to chart

## 2023-05-01 ENCOUNTER — TELEPHONE (OUTPATIENT)
Dept: FAMILY MEDICINE CLINIC | Facility: CLINIC | Age: 63
End: 2023-05-01

## 2023-05-01 NOTE — TELEPHONE ENCOUNTER
Mima, my name is Arleth  I'm calling from [facility]  I am trying to reach the office for the next practitioner, Faustino Rivera regarding a mutual patient  Tierra Vasquez YOB: 1960 February [id number]  We are working on the electric wheelchair order  We did fax the request for the notes March 14, 2023 to be signed by the nurse practitioner  We did receive it signed by doctor quincy Santoyo, but we need the nurse practitioner to sign it as well  The copy we received it was not signed by the nurse practitioner  I would really appreciate if I can get a call back at [phone]  My direct extension is [phone]  Thank you and have a great day   Bye

## 2023-05-04 LAB — INR PPP: 3 (ref 0.84–1.19)

## 2023-05-17 LAB
ALBUMIN SERPL-MCNC: 3.1 G/DL (ref 3.6–5.1)
ALBUMIN/GLOB SERPL: 0.8 (CALC) (ref 1–2.5)
ALP SERPL-CCNC: 150 U/L (ref 37–153)
ALT SERPL-CCNC: 7 U/L (ref 6–29)
AST SERPL-CCNC: 15 U/L (ref 10–35)
BILIRUB SERPL-MCNC: 0.5 MG/DL (ref 0.2–1.2)
BUN SERPL-MCNC: 16 MG/DL (ref 7–25)
BUN/CREAT SERPL: 11 (CALC) (ref 6–22)
CALCIUM SERPL-MCNC: 8.9 MG/DL (ref 8.6–10.4)
CALCIUM SERPL-MCNC: 9 MG/DL (ref 8.6–10.4)
CHLORIDE SERPL-SCNC: 98 MMOL/L (ref 98–110)
CK SERPL-CCNC: 15 U/L (ref 29–143)
CO2 SERPL-SCNC: 41 MMOL/L (ref 20–32)
CREAT SERPL-MCNC: 1.41 MG/DL (ref 0.5–1.05)
CREAT UR-MCNC: 71 MG/DL (ref 20–275)
ERYTHROCYTE [DISTWIDTH] IN BLOOD BY AUTOMATED COUNT: 14.2 % (ref 11–15)
FERRITIN SERPL-MCNC: 54 NG/ML (ref 16–288)
GFR/BSA.PRED SERPLBLD CYS-BASED-ARV: 42 ML/MIN/1.73M2
GLOBULIN SER CALC-MCNC: 3.8 G/DL (CALC) (ref 1.9–3.7)
GLUCOSE SERPL-MCNC: 82 MG/DL (ref 65–99)
HCT VFR BLD AUTO: 32.9 % (ref 35–45)
HGB BLD-MCNC: 9.8 G/DL (ref 11.7–15.5)
IRON SATN MFR SERPL: 28 % (CALC) (ref 16–45)
IRON SERPL-MCNC: 84 MCG/DL (ref 45–160)
MAGNESIUM SERPL-MCNC: 1.9 MG/DL (ref 1.5–2.5)
MCH RBC QN AUTO: 27.6 PG (ref 27–33)
MCHC RBC AUTO-ENTMCNC: 29.8 G/DL (ref 32–36)
MCV RBC AUTO: 92.7 FL (ref 80–100)
PHOSPHATE SERPL-MCNC: 3.1 MG/DL (ref 2.5–4.5)
PLATELET # BLD AUTO: 262 THOUSAND/UL (ref 140–400)
PMV BLD REES-ECKER: 9.6 FL (ref 7.5–12.5)
POTASSIUM SERPL-SCNC: 4.3 MMOL/L (ref 3.5–5.3)
PROT SERPL-MCNC: 6.9 G/DL (ref 6.1–8.1)
PROT UR-MCNC: 35 MG/DL (ref 5–24)
PROT/CREAT UR: 0.49 MG/MG CREAT (ref 0.02–0.18)
PROT/CREAT UR: 493 MG/G CREAT (ref 24–184)
PTH-INTACT SERPL-MCNC: 86 PG/ML (ref 16–77)
RBC # BLD AUTO: 3.55 MILLION/UL (ref 3.8–5.1)
SODIUM SERPL-SCNC: 143 MMOL/L (ref 135–146)
TIBC SERPL-MCNC: 296 MCG/DL (CALC) (ref 250–450)
WBC # BLD AUTO: 5.6 THOUSAND/UL (ref 3.8–10.8)

## 2023-05-17 NOTE — RESULT ENCOUNTER NOTE
Results reviewed and labs stable  Renal function stable and improved  Results to be reviewed in detail at scheduled follow-up appointment in 2 weeks

## 2023-05-23 ENCOUNTER — ANTICOAG VISIT (OUTPATIENT)
Dept: FAMILY MEDICINE CLINIC | Facility: CLINIC | Age: 63
End: 2023-05-23

## 2023-05-23 ENCOUNTER — TELEPHONE (OUTPATIENT)
Dept: FAMILY MEDICINE CLINIC | Facility: CLINIC | Age: 63
End: 2023-05-23

## 2023-05-23 DIAGNOSIS — I48.0 PAROXYSMAL ATRIAL FIBRILLATION (HCC): ICD-10-CM

## 2023-05-23 DIAGNOSIS — Z79.01 WARFARIN ANTICOAGULATION: Primary | ICD-10-CM

## 2023-05-23 NOTE — PROGRESS NOTES
Called and spoke to Constanza(significant other) advised to remain on same dose of coumadin 3 mg on Wednesday and Friday and 1 mg other days and recheck INR in 2 weeks

## 2023-05-23 NOTE — TELEPHONE ENCOUNTER
Called and spoke to Constanza(significant other) advised to remain on same dose of coumadin and recheck INR in 2 weeks as per Trenton Valadez

## 2023-05-26 ENCOUNTER — OFFICE VISIT (OUTPATIENT)
Dept: FAMILY MEDICINE CLINIC | Facility: CLINIC | Age: 63
End: 2023-05-26

## 2023-05-26 VITALS
HEIGHT: 64 IN | RESPIRATION RATE: 18 BRPM | OXYGEN SATURATION: 99 % | TEMPERATURE: 97.1 F | HEART RATE: 60 BPM | SYSTOLIC BLOOD PRESSURE: 120 MMHG | BODY MASS INDEX: 49.85 KG/M2 | DIASTOLIC BLOOD PRESSURE: 78 MMHG

## 2023-05-26 DIAGNOSIS — Z99.81 OXYGEN DEPENDENT: ICD-10-CM

## 2023-05-26 DIAGNOSIS — R73.03 PRE-DIABETES: Primary | ICD-10-CM

## 2023-05-26 DIAGNOSIS — Z59.9 FINANCIAL DIFFICULTIES: ICD-10-CM

## 2023-05-26 DIAGNOSIS — Z79.01 WARFARIN ANTICOAGULATION: ICD-10-CM

## 2023-05-26 DIAGNOSIS — D62 ACUTE BLOOD LOSS ANEMIA: ICD-10-CM

## 2023-05-26 DIAGNOSIS — I48.0 PAROXYSMAL ATRIAL FIBRILLATION (HCC): ICD-10-CM

## 2023-05-26 DIAGNOSIS — K92.1 MELENA: ICD-10-CM

## 2023-05-26 DIAGNOSIS — I48.11 LONGSTANDING PERSISTENT ATRIAL FIBRILLATION (HCC): ICD-10-CM

## 2023-05-26 DIAGNOSIS — Z00.00 MEDICARE ANNUAL WELLNESS VISIT, SUBSEQUENT: ICD-10-CM

## 2023-05-26 DIAGNOSIS — B37.2 SKIN CANDIDIASIS: ICD-10-CM

## 2023-05-26 DIAGNOSIS — D68.9 COAGULOPATHY (HCC): ICD-10-CM

## 2023-05-26 DIAGNOSIS — Z59.82 INABILITY TO ACQUIRE TRANSPORTATION: ICD-10-CM

## 2023-05-26 PROBLEM — S81.802A LEG WOUND, LEFT, INITIAL ENCOUNTER: Status: RESOLVED | Noted: 2022-10-26 | Resolved: 2023-05-26

## 2023-05-26 PROBLEM — N18.9 ACUTE RENAL FAILURE SUPERIMPOSED ON CHRONIC KIDNEY DISEASE (HCC): Status: RESOLVED | Noted: 2020-09-29 | Resolved: 2023-05-26

## 2023-05-26 PROBLEM — J96.11 CHRONIC RESPIRATORY FAILURE WITH HYPOXIA (HCC): Status: ACTIVE | Noted: 2023-05-26

## 2023-05-26 PROBLEM — E11.21 DIABETIC NEPHROPATHY ASSOCIATED WITH TYPE 2 DIABETES MELLITUS (HCC): Status: RESOLVED | Noted: 2022-07-09 | Resolved: 2023-05-26

## 2023-05-26 PROBLEM — E11.69 DIABETES MELLITUS TYPE 2 IN OBESE (HCC): Status: RESOLVED | Noted: 2022-04-25 | Resolved: 2023-05-26

## 2023-05-26 PROBLEM — N18.4 ANEMIA IN STAGE 4 CHRONIC KIDNEY DISEASE (HCC): Status: RESOLVED | Noted: 2023-02-08 | Resolved: 2023-05-26

## 2023-05-26 PROBLEM — N18.32 ANEMIA IN STAGE 3B CHRONIC KIDNEY DISEASE (HCC): Status: ACTIVE | Noted: 2023-05-26

## 2023-05-26 PROBLEM — D63.1 ANEMIA IN STAGE 3B CHRONIC KIDNEY DISEASE (HCC): Status: ACTIVE | Noted: 2023-05-26

## 2023-05-26 PROBLEM — Z11.4 SCREENING FOR HIV (HUMAN IMMUNODEFICIENCY VIRUS): Status: RESOLVED | Noted: 2022-04-25 | Resolved: 2023-05-26

## 2023-05-26 PROBLEM — J96.22 ACUTE ON CHRONIC RESPIRATORY FAILURE WITH HYPOXIA AND HYPERCAPNIA (HCC): Status: RESOLVED | Noted: 2022-11-21 | Resolved: 2023-05-26

## 2023-05-26 PROBLEM — E11.40 DIABETIC NEUROPATHY (HCC): Status: RESOLVED | Noted: 2022-10-26 | Resolved: 2023-05-26

## 2023-05-26 PROBLEM — E66.9 DIABETES MELLITUS TYPE 2 IN OBESE (HCC): Status: RESOLVED | Noted: 2022-04-25 | Resolved: 2023-05-26

## 2023-05-26 PROBLEM — N18.4 CKD (CHRONIC KIDNEY DISEASE) STAGE 4, GFR 15-29 ML/MIN (HCC): Status: RESOLVED | Noted: 2022-12-11 | Resolved: 2023-05-26

## 2023-05-26 PROBLEM — E11.42 DIABETIC POLYNEUROPATHY ASSOCIATED WITH TYPE 2 DIABETES MELLITUS (HCC): Status: RESOLVED | Noted: 2022-10-26 | Resolved: 2023-05-26

## 2023-05-26 PROBLEM — B35.9 TINEA: Status: RESOLVED | Noted: 2022-07-27 | Resolved: 2023-05-26

## 2023-05-26 PROBLEM — N18.9 CKD (CHRONIC KIDNEY DISEASE): Status: ACTIVE | Noted: 2023-05-26

## 2023-05-26 PROBLEM — R06.02 SOB (SHORTNESS OF BREATH): Status: RESOLVED | Noted: 2020-09-09 | Resolved: 2023-05-26

## 2023-05-26 PROBLEM — L03.311 ABDOMINAL WALL CELLULITIS: Status: RESOLVED | Noted: 2022-12-11 | Resolved: 2023-05-26

## 2023-05-26 PROBLEM — D63.1 ANEMIA IN STAGE 4 CHRONIC KIDNEY DISEASE (HCC): Status: RESOLVED | Noted: 2023-02-08 | Resolved: 2023-05-26

## 2023-05-26 PROBLEM — J96.21 ACUTE ON CHRONIC RESPIRATORY FAILURE WITH HYPOXIA AND HYPERCAPNIA (HCC): Status: RESOLVED | Noted: 2022-11-21 | Resolved: 2023-05-26

## 2023-05-26 PROBLEM — N17.9 ACUTE RENAL FAILURE SUPERIMPOSED ON CHRONIC KIDNEY DISEASE (HCC): Status: RESOLVED | Noted: 2020-09-29 | Resolved: 2023-05-26

## 2023-05-26 LAB — SL AMB POCT HEMOGLOBIN AIC: 5.4 (ref ?–6.5)

## 2023-05-26 RX ORDER — WARFARIN SODIUM 3 MG/1
3 TABLET ORAL DAILY
Qty: 90 TABLET | Refills: 3 | Status: SHIPPED | OUTPATIENT
Start: 2023-05-26

## 2023-05-26 RX ORDER — METOPROLOL TARTRATE 50 MG/1
50 TABLET, FILM COATED ORAL 2 TIMES DAILY
Qty: 180 TABLET | Refills: 3 | Status: SHIPPED | OUTPATIENT
Start: 2023-05-26

## 2023-05-26 RX ORDER — WARFARIN SODIUM 1 MG/1
1 TABLET ORAL
Qty: 90 TABLET | Refills: 2 | Status: SHIPPED | OUTPATIENT
Start: 2023-05-26

## 2023-05-26 RX ORDER — PANTOPRAZOLE SODIUM 40 MG/1
40 TABLET, DELAYED RELEASE ORAL DAILY
Qty: 90 TABLET | Refills: 3 | Status: SHIPPED | OUTPATIENT
Start: 2023-05-26 | End: 2023-08-24

## 2023-05-26 RX ORDER — NYSTATIN 100000 U/G
CREAM TOPICAL 2 TIMES DAILY
Qty: 30 G | Refills: 3 | Status: SHIPPED | OUTPATIENT
Start: 2023-05-26

## 2023-05-26 SDOH — ECONOMIC STABILITY - INCOME SECURITY: PROBLEM RELATED TO HOUSING AND ECONOMIC CIRCUMSTANCES, UNSPECIFIED: Z59.9

## 2023-05-26 SDOH — ECONOMIC STABILITY - TRANSPORTATION SECURITY: TRANSPORTATION INSECURITY: Z59.82

## 2023-05-26 NOTE — PROGRESS NOTES
Assessment and Plan:     Problem List Items Addressed This Visit        Cardiovascular and Mediastinum    Paroxysmal atrial fibrillation (HCC) (Chronic)    Relevant Medications    warfarin (Coumadin) 1 mg tablet    metoprolol tartrate (LOPRESSOR) 50 mg tablet       Other    RESOLVED: Acute blood loss anemia    Relevant Medications    pantoprazole (PROTONIX) 40 mg tablet   Other Visit Diagnoses     Pre-diabetes    -  Primary    Relevant Orders    POCT hemoglobin A1c (Completed)    Financial difficulties        Relevant Orders    Ambulatory referral to social work care management program    Inability to acquire transportation        Relevant Orders    Ambulatory referral to social work care management program    Longstanding persistent atrial fibrillation (HCC)        Relevant Medications    warfarin (COUMADIN) 3 mg tablet    metoprolol tartrate (LOPRESSOR) 50 mg tablet    Warfarin anticoagulation        goal 2-3    Relevant Medications    warfarin (Coumadin) 1 mg tablet    Coagulopathy (HCC)        Relevant Medications    warfarin (COUMADIN) 3 mg tablet    warfarin (Coumadin) 1 mg tablet    Skin candidiasis        Relevant Medications    nystatin (MYCOSTATIN) cream    Melena        Relevant Medications    pantoprazole (PROTONIX) 40 mg tablet    Medicare annual wellness visit, subsequent        Oxygen dependent              Patient clinically stable at this time  Cont with current plan of care  Will follow along with specialists  RTO as recommended and PRN    Patient struggling with portable oxygen tanks  Too heavy-must carry 2-3 portable tanks  Is dependant on oxygen 4 lpm  Is requesting switch to portable-rechargable concentrator if possible    Preventive health issues were discussed with patient, and age appropriate screening tests were ordered as noted in patient's After Visit Summary    Personalized health advice and appropriate referrals for health education or preventive services given if needed, as noted in patient's After Visit Summary  History of Present Illness:     Patient presents for a Medicare Wellness Visit    Here for AW[facility] med refill  Arrives with significant other  Uses motorized wheelchair    Recent labs performed for nephrology  CKD, cbc stable    Last inr 3 0    Is requesting rx for portable oxygen     Patient Care Team:  Concepción Thompson as PCP - General (Family Medicine)  Tj Berkowitz MD     Review of Systems:     Review of Systems   Constitutional: Positive for fatigue  Negative for fever  Respiratory: Positive for shortness of breath  Negative for cough  Oxygen dependant   Cardiovascular: Positive for leg swelling  Negative for chest pain and palpitations  Gastrointestinal: Positive for nausea  Negative for abdominal pain  Genitourinary: Negative for hematuria  Musculoskeletal: Positive for arthralgias and gait problem  Skin: Negative for pallor and wound  Neurological: Positive for weakness  Negative for dizziness and headaches  Hematological: Does not bruise/bleed easily  Psychiatric/Behavioral: Positive for dysphoric mood  Negative for suicidal ideas  The patient is nervous/anxious           Problem List:     Patient Active Problem List   Diagnosis   • Hypertensive chronic kidney disease with stage 1 through stage 4 chronic kidney disease, or unspecified chronic kidney disease   • Persistent proteinuria   • Iron deficiency   • Dyslipidemia   • Hyperparathyroidism ([facility] )   • Cellulitis   • H/O right nephrectomy   • Paroxysmal atrial fibrillation (HCC)   • COPD with asthma ([facility] )   • Hypothyroid   • Chronic constipation   • GERD (gastroesophageal reflux disease)   • Anxiety   • Secondary hyperparathyroidism of renal origin ([facility] )   • Morbid obesity with BMI of 50 0-59 9, adult ([facility] )   • Depression, recurrent (HCC)   • Pre-ulcerative corn or callous   • Cardiomegaly   • Abnormal CT scan   • Panniculus   • Bilateral pleural effusion   • Panniculitis   • History of hysterectomy   • CKD (chronic kidney disease)   • Anemia in stage 3b chronic kidney disease (HCC)   • Chronic respiratory failure with hypoxia (HCC)      Past Medical and Surgical History:     Past Medical History:   Diagnosis Date   • Anemia    • Arthritis    • Cancer of kidney ([facility] )    • Chronic kidney disease    • Diabetes mellitus ([facility] )    • Disease of thyroid gland    • HLD (hyperlipidemia)    • Hypertension    • Ovarian cancer ([facility] )    • Pneumonia      Past Surgical History:   Procedure Laterality Date   • CHOLECYSTECTOMY     • CT GUIDED PERC DRAINAGE CATHETER PLACEMENT  2016   • GALLBLADDER SURGERY  2004   • HYSTERECTOMY  2018   • NEPHRECTOMY Right 2018      Family History:     Family History   Problem Relation Age of Onset   • No Known Problems Mother    • No Known Problems Father       Social History:     Social History     Socioeconomic History   • Marital status: Single     Spouse name: None   • Number of children: 0   • Years of education: None   • Highest education level: 12th grade   Occupational History   • None   Tobacco Use   • Smoking status: Former     Packs/day: 3 00     Years: 30 00     Total pack years: 90 00     Types: Cigarettes     Quit date: 10/22/2010     Years since quittin 6   • Smokeless tobacco: Former     Quit date: 2011   • Tobacco comments:     quit approx   9 years ago   Vaping Use   • Vaping Use: Never used   Substance and Sexual Activity   • Alcohol use: Never     Comment: n/a   • Drug use: Yes     Types: Marijuana     Comment: smokes with girlfriend when anxious   • Sexual activity: Yes     Partners: Female   Other Topics Concern   • None   Social History Narrative    · Most recent tobacco use screenin2020      · Do you currently or have you served in the [facility] [employer]:   No      · Were you activated, into active duty, as a member of the [employer] or as a Reservist:   No      ·     Last modified by [email or web address]   2020, 10:39 Social Determinants of Health     Financial Resource Strain: Medium Risk (5/26/2023)    Overall Financial Resource Strain (CARDIA)    • Difficulty of Paying Living Expenses: Somewhat hard   Food Insecurity: No Food Insecurity (12/13/2022)    Hunger Vital Sign    • Worried About Running Out of Food in the Last Year: Never true    • Ran Out of Food in the Last Year: Never true   Transportation Needs: Unmet Transportation Needs (5/26/2023)    PRAPARE - Transportation    • Lack of Transportation (Medical):  Yes    • Lack of Transportation (Non-Medical): Yes   Physical Activity: Not on file   Stress: No Stress Concern Present (10/22/2020)    [address]    • Feeling of Stress : Not at all   Social Connections: Socially Isolated (10/22/2020)    Social Connection and Isolation Panel [NHANES]    • Frequency of Communication with Friends and Family: Once a week    • Frequency of Social Gatherings with Friends and Family: Never    • Attends Church Services: Never    • Active Member of Clubs or Organizations: No    • Attends Club or Organization Meetings: Never    • Marital Status: Never    Intimate Partner Violence: Not At Risk (10/22/2020)    Humiliation, Afraid, Rape, and Kick questionnaire    • Fear of Current or Ex-Partner: No    • Emotionally Abused: No    • Physically Abused: No    • Sexually Abused: No   Housing Stability: [address]  (12/13/2022)    Housing Stability Vital Sign    • Unable to Pay for Housing in the Last Year: No    • Number of Places Lived in the Last Year: 1    • Unstable Housing in the Last Year: No      Medications and Allergies:     Current Outpatient Medications   Medication Sig Dispense Refill   • albuterol (2 5 mg/3 mL) 0 083 % nebulizer solution INHALE CONTENTS OF 1 VIAL ( 3 MILLILITERS ) IN NEBULIZER BY MOUTH AND INTO THE LUNGS EVERY 6 HOURS IF NEEDED FOR WHEEZING OR SHORTNESS OF BREATH 75 mL 5   • albuterol (PROVENTIL HFA,VENTOLIN HFA) 90 mcg/act inhaler inhale 2 puffs every 4 hours if needed for wheezing     • amLODIPine (NORVASC) 5 mg tablet Take 1 tablet (5 mg total) by mouth daily 90 tablet 2   • atorvastatin (LIPITOR) 10 mg tablet Take 1 tablet (10 mg total) by mouth daily 90 tablet 3   • cholecalciferol (VITAMIN D3) 400 units tablet Take 1 tablet (400 Units total) by mouth daily 100 tablet 4   • famotidine (PEPCID) 20 mg tablet Take 1 tablet (20 mg total) by mouth daily 90 tablet 3   • ferrous sulfate 325 (65 Fe) mg tablet Take 1 tablet (325 mg total) by mouth daily with breakfast 90 tablet 3   • furosemide (LASIX) 40 mg tablet Take 1 tablet (40 mg total) by mouth daily (Patient taking differently: Take 40 mg by mouth every other day And and as needed dose) 90 tablet 3   • hydrOXYzine HCL (ATARAX) 50 mg tablet Take 1 tablet (50 mg total) by mouth daily at bedtime 30 tablet 12   • levothyroxine 100 mcg tablet Take 1 tablet (100 mcg total) by mouth daily 90 tablet 3   • LORazepam (ATIVAN) 0 5 mg tablet Take 1 tablet (0 5 mg total) by mouth 2 (two) times a day as needed for anxiety 60 tablet 4   • metoprolol tartrate (LOPRESSOR) 50 mg tablet Take 1 tablet (50 mg total) by mouth 2 (two) times a day 180 tablet 3   • nystatin (MYCOSTATIN) cream Apply topically 2 (two) times a day 30 g 3   • pantoprazole (PROTONIX) 40 mg tablet Take 1 tablet (40 mg total) by mouth daily 90 tablet 3   • sertraline (Zoloft) 50 mg tablet Take 1 tablet (50 mg total) by mouth daily 30 tablet 5   • warfarin (Coumadin) 1 mg tablet Take 1 tablet (1 mg total) by mouth daily 90 tablet 2   • warfarin (COUMADIN) 3 mg tablet Take 1 tablet (3 mg total) by mouth daily 90 tablet 3   • Blood Glucose Monitoring Suppl (Zangoace Pro Glucose Meter) SAVANNAH Use 2 (two) times a day Zangoace glucometer, test twice daily (Patient not taking: Reported on 5/26/2023) 1 each 0   • collagenase (SANTYL) ointment Apply topically daily (Patient not taking: Reported on 5/26/2023) 15 g 1   • Embrace Lancets Ultra Thin 30G MISC Use 2 (two) times a day (Patient not taking: Reported on 5/26/2023) 100 each 5   • glucose blood (Embrace Blood Glucose Test) test strip Use as instructed to check sugar bid (Patient not taking: Reported on 5/26/2023) 100 strip 4   • umeclidinium-vilanterol (Anoro Ellipta) 62 5-25 mcg/actuation inhaler Inhale 1 puff daily (Patient not taking: Reported on 2/16/2023) 60 blister 5     No current facility-administered medications for this visit  No Known Allergies   Immunizations:     Immunization History   Administered Date(s) Administered   • COVID-19 MODERNA VACC 0 5 ML IM 04/07/2021, 05/05/2021   • Influenza, recombinant, quadrivalent,injectable, preservative free 11/18/2021, 10/26/2022   • Pneumococcal Conjugate Vaccine 20-valent (Pcv20), Polysace 07/27/2022   • Pneumococcal Polysaccharide PPV23 03/09/2020   • Zoster Vaccine Recombinant 03/09/2020, 08/10/2020      Health Maintenance:         Topic Date Due   • HIV Screening  06/26/2023 (Originally 2/18/1975)   • Hepatitis C Screening  05/26/2024 (Originally 1960)   • Breast Cancer Screening: Mammogram  05/26/2024 (Originally [birth year]/1999)   • Lung Cancer Screening  12/11/2023   • Colorectal Cancer Screening  12/01/2027         Topic Date Due   • COVID-19 Vaccine (3 - [employer] series) 06/30/2021      Medicare Screening Tests and Risk Assessments:     Misty Yun is here for her Subsequent Wellness visit  Health Risk Assessment:   Patient rates overall health as fair  Patient feels that their physical health rating is same  Patient is satisfied with their life  Eyesight was rated as much worse  Hearing was rated as same  Patient feels that their emotional and mental health rating is same  Patients states they are often angry  Patient states they are always unusually tired/fatigued  Pain experienced in the last 7 days has been a lot  Patient's pain rating has been 7/10   Patient states that she has experienced weight loss or gain in last 6 months  Fall Risk Screening: In the past year, patient has experienced: no history of falling in past year      Urinary Incontinence Screening:   Patient has not leaked urine accidently in the last six months  Home Safety:  Patient has trouble with stairs inside or outside of their home  Patient has working smoke alarms and has working carbon monoxide detector  Home safety hazards include: medications that cause fatigue and poor household lighting  Nutrition:   Current diet is Regular and Limited junk food  Medications:   Patient is not currently taking any over-the-counter supplements  Patient is able to manage medications  Activities of Daily Living (ADLs)/Instrumental Activities of Daily Living (IADLs):   Walk and transfer into and out of bed and chair?: Yes  Dress and groom yourself?: No    Bathe or shower yourself?: No    Feed yourself? Yes  Do your laundry/housekeeping?: No  Manage your money, pay your bills and track your expenses?: Yes  Make your own meals?: No    Do your own shopping?: Yes    Previous Hospitalizations:   Any hospitalizations or ED visits within the last 12 months?: No      Advance Care Planning:   Living will: Yes    Durable POA for healthcare:  Yes    Advanced directive: Yes    Advanced directive counseling given: Yes      Cognitive Screening:   Provider or family/friend/caregiver concerned regarding cognition?: No    PREVENTIVE SCREENINGS      Cardiovascular Screening:    General: Screening Current      Diabetes Screening:     General: History Diabetes and Screening Current      Colorectal Cancer Screening:     General: Screening Current      Breast Cancer Screening:     General: Patient Declines and Risks and Benefits Discussed      Cervical Cancer Screening:    General: Screening Not Indicated      Osteoporosis Screening:    General: Risks and Benefits Discussed      Abdominal Aortic Aneurysm (AAA) Screening:        General: "Screening Not Indicated      Lung Cancer Screening:     General: Screening Current      Hepatitis C Screening:    General: Screening Current    Screening, Brief Intervention, and Referral to Treatment (SBIRT)    Screening  Typical number of drinks in a day: 0  Typical number of drinks in a week: 0  Interpretation: Low risk drinking behavior  Single Item Drug Screening:  How often have you used an illegal drug (including marijuana) or a prescription medication for non-medical reasons in the past year? monthly  What drug(s) or prescription medication(s)? marijuana    Single Item Drug Screen Score: 2  Interpretation: POSITIVE screen for possible drug use disorder    Drug Abuse Screening Test (DAST-10):  1) Have you used drugs other than those required for medical reasons? Yes  2) Do you abuse more than one drug at a time? No  3) Are you always able to stop using drugs when you want to? Yes  4) Have you had \"blackouts\" or \"flashbacks\" as a result of drug use? No  5) Do you ever feel bad or guilty about your drug use? No  6) Does your spouse (or parents) ever complain about your involvement with drugs? No  7) Have you neglected your family because of your use of drugs? No  8) Have you engaged in illegal activities in order to obtain drugs? No  9) Have you ever experienced withdrawal symptoms (felt sick) when you stopped taking drugs? No  10) Have you had medical problems as a result of your drug use (e g , memory loss, hepatitis, convulsions, bleeding, etc )? No    DAST-10 Score: 1  Interpretation: Low level problems related to drug abuse    Brief Intervention  Alcohol & drug use screenings were reviewed  No concerns regarding substance use disorder identified  No results found       Physical Exam:     /78 (BP Location: Left arm, Patient Position: Sitting, Cuff Size: Large)   Pulse 60   Temp (!) 97 1 °F (36 2 °C) (Tympanic)   Resp 18   Ht 5' 4\" (1 626 m)   SpO2 99%   BMI 49 85 kg/m²     Physical " Exam  Vitals and nursing note reviewed  Constitutional:       General: She is not in acute distress  Appearance: Normal appearance  She is obese  Comments: Patient arrives in a motorized wheelchair   Cardiovascular:      Rate and Rhythm: Normal rate  Rhythm irregular  Pulses: Normal pulses  Heart sounds: Murmur heard  Pulmonary:      Effort: Pulmonary effort is normal       Breath sounds: Normal breath sounds  Musculoskeletal:      Right lower leg: Edema present  Left lower leg: Edema present  Skin:     Coloration: Skin is not pale  Neurological:      General: No focal deficit present  Mental Status: She is alert  Mental status is at baseline            Jason Embs, [address] [place] [address] St

## 2023-06-01 ENCOUNTER — PATIENT OUTREACH (OUTPATIENT)
Dept: FAMILY MEDICINE CLINIC | Facility: CLINIC | Age: 63
End: 2023-06-01

## 2023-06-01 LAB — INR PPP: 1.3 (ref 0.84–1.19)

## 2023-06-01 NOTE — PROGRESS NOTES
Received referral from PCP Fidel Arthur requesting that OP [facility] outreach patient and assess for needs  Per chart review, patient needing assistance with transportation  OP SWCM spoke to [facility] (patients significant other/caretaker), introduced role and reason for call  Reported that they have means of transportation and use [employer] services  [facility] reported that they have SNAP benefits and are able to manage food, bills, etc     [facility] reported no needs  Encouraged [facility] and patient to contact office if any needs arise  OP SWCM will close case but remains available if needed

## 2023-06-02 ENCOUNTER — TELEPHONE (OUTPATIENT)
Dept: FAMILY MEDICINE CLINIC | Facility: CLINIC | Age: 63
End: 2023-06-02

## 2023-06-02 ENCOUNTER — ANTICOAG VISIT (OUTPATIENT)
Dept: FAMILY MEDICINE CLINIC | Facility: CLINIC | Age: 63
End: 2023-06-02

## 2023-06-02 LAB
INR PPP: 1.3
PROTHROMBIN TIME: 13.6 SEC (ref 9–11.5)

## 2023-06-02 NOTE — PROGRESS NOTES
Called and spoke to Constanza(significant other) and patient advised to take 3 mg on Monday, Wednesday and Friday and 1 mg on other days and recheck INR in 10 days

## 2023-06-02 NOTE — TELEPHONE ENCOUNTER
----- Message from Hunter Elkins, [address] sent at 6/2/2023  8:47 AM EDT -----  INR 1 3 3 mg m-w-f  1 mg other days, INR in 10 days

## 2023-06-21 ENCOUNTER — TELEPHONE (OUTPATIENT)
Dept: FAMILY MEDICINE CLINIC | Facility: CLINIC | Age: 63
End: 2023-06-21

## 2023-06-21 NOTE — TELEPHONE ENCOUNTER
Received fax from [employer] for  Signature on order for power wheelchair, signed,faxed and scanned to chart

## 2023-07-04 LAB
INR PPP: 2.5
PROTHROMBIN TIME: 25.1 SEC (ref 9–11.5)

## 2023-07-06 ENCOUNTER — ANTICOAG VISIT (OUTPATIENT)
Dept: FAMILY MEDICINE CLINIC | Facility: CLINIC | Age: 63
End: 2023-07-06

## 2023-07-06 NOTE — PROGRESS NOTES
- spoke with shaun on instructions on pt inr and to recheck in 3 weeks. Juan Boston verbalized understanding. [facility] sheet was faxed for INR check.

## 2023-07-10 DIAGNOSIS — Z90.5 H/O RIGHT NEPHRECTOMY: ICD-10-CM

## 2023-07-10 DIAGNOSIS — N18.32 ANEMIA DUE TO STAGE 3B CHRONIC KIDNEY DISEASE (HCC): ICD-10-CM

## 2023-07-10 DIAGNOSIS — E78.5 DYSLIPIDEMIA: ICD-10-CM

## 2023-07-10 DIAGNOSIS — D63.1 ANEMIA DUE TO STAGE 3B CHRONIC KIDNEY DISEASE (HCC): ICD-10-CM

## 2023-07-10 DIAGNOSIS — R80.1 PERSISTENT PROTEINURIA: ICD-10-CM

## 2023-07-10 DIAGNOSIS — N18.32 STAGE 3B CHRONIC KIDNEY DISEASE (HCC): ICD-10-CM

## 2023-07-10 DIAGNOSIS — E66.01 MORBID (SEVERE) OBESITY DUE TO EXCESS CALORIES (HCC): ICD-10-CM

## 2023-07-10 DIAGNOSIS — E61.1 IRON DEFICIENCY: ICD-10-CM

## 2023-07-10 DIAGNOSIS — N25.81 SECONDARY HYPERPARATHYROIDISM OF RENAL ORIGIN (HCC): ICD-10-CM

## 2023-07-10 DIAGNOSIS — I12.9 HYPERTENSIVE CHRONIC KIDNEY DISEASE WITH STAGE 1 THROUGH STAGE 4 CHRONIC KIDNEY DISEASE, OR UNSPECIFIED CHRONIC KIDNEY DISEASE: ICD-10-CM

## 2023-07-10 RX ORDER — AMLODIPINE BESYLATE 5 MG/1
TABLET ORAL
Qty: 90 TABLET | Refills: 2 | Status: SHIPPED | OUTPATIENT
Start: 2023-07-10

## 2023-07-14 LAB
ALBUMIN SERPL-MCNC: 3.1 G/DL (ref 3.6–5.1)
ALBUMIN SERPL-MCNC: 3.1 G/DL (ref 3.6–5.1)
ALBUMIN/GLOB SERPL: 0.9 (CALC) (ref 1–2.5)
ALBUMIN/GLOB SERPL: 0.9 (CALC) (ref 1–2.5)
ALP SERPL-CCNC: 150 U/L (ref 37–153)
ALP SERPL-CCNC: 162 U/L (ref 37–153)
ALT SERPL-CCNC: 7 U/L (ref 6–29)
ALT SERPL-CCNC: 7 U/L (ref 6–29)
AST SERPL-CCNC: 14 U/L (ref 10–35)
AST SERPL-CCNC: 15 U/L (ref 10–35)
BILIRUB SERPL-MCNC: 0.3 MG/DL (ref 0.2–1.2)
BILIRUB SERPL-MCNC: 0.3 MG/DL (ref 0.2–1.2)
BUN SERPL-MCNC: 25 MG/DL (ref 7–25)
BUN SERPL-MCNC: 25 MG/DL (ref 7–25)
BUN/CREAT SERPL: 16 (CALC) (ref 6–22)
BUN/CREAT SERPL: 16 (CALC) (ref 6–22)
CALCIUM SERPL-MCNC: 8.8 MG/DL (ref 8.6–10.4)
CALCIUM SERPL-MCNC: 8.8 MG/DL (ref 8.6–10.4)
CALCIUM SERPL-MCNC: 8.9 MG/DL (ref 8.6–10.4)
CHLORIDE SERPL-SCNC: 101 MMOL/L (ref 98–110)
CHLORIDE SERPL-SCNC: 103 MMOL/L (ref 98–110)
CK SERPL-CCNC: 21 U/L (ref 29–143)
CK SERPL-CCNC: 22 U/L (ref 29–143)
CO2 SERPL-SCNC: 29 MMOL/L (ref 20–32)
CO2 SERPL-SCNC: 33 MMOL/L (ref 20–32)
CREAT SERPL-MCNC: 1.55 MG/DL (ref 0.5–1.05)
CREAT SERPL-MCNC: 1.6 MG/DL (ref 0.5–1.05)
CREAT UR-MCNC: 31 MG/DL (ref 20–275)
CREAT UR-MCNC: NORMAL MG/DL
ERYTHROCYTE [DISTWIDTH] IN BLOOD BY AUTOMATED COUNT: 14.2 % (ref 11–15)
FERRITIN SERPL-MCNC: 102 NG/ML (ref 16–288)
FERRITIN SERPL-MCNC: 112 NG/ML (ref 16–288)
GFR/BSA.PRED SERPLBLD CYS-BASED-ARV: 36 ML/MIN/1.73M2
GFR/BSA.PRED SERPLBLD CYS-BASED-ARV: 37 ML/MIN/1.73M2
GLOBULIN SER CALC-MCNC: 3.4 G/DL (CALC) (ref 1.9–3.7)
GLOBULIN SER CALC-MCNC: 3.5 G/DL (CALC) (ref 1.9–3.7)
GLUCOSE SERPL-MCNC: 83 MG/DL (ref 65–99)
GLUCOSE SERPL-MCNC: 85 MG/DL (ref 65–99)
HCT VFR BLD AUTO: 34.1 % (ref 35–45)
HGB BLD-MCNC: 10.6 G/DL (ref 11.7–15.5)
IRON SATN MFR SERPL: 30 % (CALC) (ref 16–45)
IRON SATN MFR SERPL: 31 % (CALC) (ref 16–45)
IRON SERPL-MCNC: 72 MCG/DL (ref 45–160)
IRON SERPL-MCNC: 73 MCG/DL (ref 45–160)
MAGNESIUM SERPL-MCNC: 1.7 MG/DL (ref 1.5–2.5)
MAGNESIUM SERPL-MCNC: 1.8 MG/DL (ref 1.5–2.5)
MCH RBC QN AUTO: 28.6 PG (ref 27–33)
MCHC RBC AUTO-ENTMCNC: 31.1 G/DL (ref 32–36)
MCV RBC AUTO: 92.2 FL (ref 80–100)
PHOSPHATE SERPL-MCNC: 3.5 MG/DL (ref 2.5–4.5)
PHOSPHATE SERPL-MCNC: 3.6 MG/DL (ref 2.5–4.5)
PLATELET # BLD AUTO: 276 THOUSAND/UL (ref 140–400)
PMV BLD REES-ECKER: 9.8 FL (ref 7.5–12.5)
POTASSIUM SERPL-SCNC: 4.4 MMOL/L (ref 3.5–5.3)
POTASSIUM SERPL-SCNC: 4.5 MMOL/L (ref 3.5–5.3)
PROT SERPL-MCNC: 6.5 G/DL (ref 6.1–8.1)
PROT SERPL-MCNC: 6.6 G/DL (ref 6.1–8.1)
PROT UR-MCNC: 15 MG/DL (ref 5–24)
PROT/CREAT UR: 0.48 MG/MG CREAT (ref 0.02–0.18)
PROT/CREAT UR: 484 MG/G CREAT (ref 24–184)
PTH-INTACT SERPL-MCNC: NORMAL PG/ML
RBC # BLD AUTO: 3.7 MILLION/UL (ref 3.8–5.1)
SODIUM SERPL-SCNC: 142 MMOL/L (ref 135–146)
SODIUM SERPL-SCNC: 142 MMOL/L (ref 135–146)
TIBC SERPL-MCNC: 239 MCG/DL (CALC) (ref 250–450)
TIBC SERPL-MCNC: 240 MCG/DL (CALC) (ref 250–450)
WBC # BLD AUTO: 6.9 THOUSAND/UL (ref 3.8–10.8)
WBC # BLD AUTO: NORMAL THOUSAND/UL

## 2023-07-15 LAB
CALCIUM SERPL-MCNC: 8.9 MG/DL (ref 8.6–10.4)
PTH-INTACT SERPL-MCNC: 22 PG/ML (ref 16–77)

## 2023-07-19 ENCOUNTER — TELEPHONE (OUTPATIENT)
Dept: NEPHROLOGY | Facility: CLINIC | Age: 63
End: 2023-07-19

## 2023-07-19 ENCOUNTER — OFFICE VISIT (OUTPATIENT)
Dept: NEPHROLOGY | Facility: CLINIC | Age: 63
End: 2023-07-19
Payer: MEDICARE

## 2023-07-19 VITALS — HEIGHT: 64 IN | BODY MASS INDEX: 49.85 KG/M2

## 2023-07-19 DIAGNOSIS — Z90.5 H/O RIGHT NEPHRECTOMY: ICD-10-CM

## 2023-07-19 DIAGNOSIS — R80.1 PERSISTENT PROTEINURIA: ICD-10-CM

## 2023-07-19 DIAGNOSIS — N25.81 SECONDARY HYPERPARATHYROIDISM OF RENAL ORIGIN (HCC): ICD-10-CM

## 2023-07-19 DIAGNOSIS — N18.32 STAGE 3B CHRONIC KIDNEY DISEASE (HCC): ICD-10-CM

## 2023-07-19 DIAGNOSIS — E78.5 DYSLIPIDEMIA: ICD-10-CM

## 2023-07-19 DIAGNOSIS — E61.1 IRON DEFICIENCY: ICD-10-CM

## 2023-07-19 DIAGNOSIS — N18.32 ANEMIA IN STAGE 3B CHRONIC KIDNEY DISEASE (HCC): ICD-10-CM

## 2023-07-19 DIAGNOSIS — I12.9 HYPERTENSIVE CHRONIC KIDNEY DISEASE WITH STAGE 1 THROUGH STAGE 4 CHRONIC KIDNEY DISEASE, OR UNSPECIFIED CHRONIC KIDNEY DISEASE: Primary | ICD-10-CM

## 2023-07-19 DIAGNOSIS — D63.1 ANEMIA IN STAGE 3B CHRONIC KIDNEY DISEASE (HCC): ICD-10-CM

## 2023-07-19 DIAGNOSIS — E66.01 MORBID OBESITY WITH BMI OF 50.0-59.9, ADULT (HCC): ICD-10-CM

## 2023-07-19 PROCEDURE — 99214 OFFICE O/P EST MOD 30 MIN: CPT | Performed by: INTERNAL MEDICINE

## 2023-07-19 NOTE — TELEPHONE ENCOUNTER
Faxed patient lab orders to [address] [place] [address] home draw for Oct and Jan lab work.  Confirmation received Declines

## 2023-07-19 NOTE — PATIENT INSTRUCTIONS
Visit summary:  -Overall kidney function doing well! - Blood pressure seems like it is doing well! Please use the left arm only for blood pressure readings  - I would change iron to just every other day it apparently gets better absorbed rather than daily  - Please contact your family physician regarding hair loss        1. Medication changes today:  Please take iron every other day        2. Please take 1 week a blood pressure readings prior to your next appointment    AS FOLLOWS  MORNING AND EVENING, SITTING AND STANDING as follows:  TAKE THE MORNING READINGS BEFORE ANY MEDICATIONS AND WHEN YOU ARE RELAXED FOR SEVERAL MINUTES  TAKE THE EVENING READINGS:  BETWEEN 7-10 P.M.; PRIOR TO ANY MEDICATIONS; AT LEAST IN OUR  FROM DINNER; AND CERTAINLY AFTER RELAXING FOR A FEW MINUTES  PLEASE INCLUDE HEART RATE WITH YOUR BLOOD PRESSURE READINGS  When taking standing readings, keep your arm supported at heart level and not dangling  Make sure you are sitting with your back supported and feet on the ground and do not cross your legs or feet  Make sure you have not taken any coffee or caffeine products or exercised or smoke cigarettes at least 30 minutes before taking your blood pressure  Then please mail these readings into the office    3. In 3 months:  Please go for nonfasting lab work but in the morning  Please take 1 week a blood pressure readings as outlined above and mail those into the office      4. Follow-up in 6-7 months  Please bring in 1 week a blood pressure readings morning evening, sitting and standing is outlined above  PLEASE BRING AN YOUR BLOOD PRESSURE MACHINE TO CORRELATE WITH THE OFFICE MACHINE AT THIS NEXT SCHEDULED VISIT  Please go for fasting lab work 1-2 weeks prior to your appointment      5.   General instructions:  AVOID SALT BUT NOT ADDING AN READING LABELS TO MAKE SURE THERE IS LOW-SALT IN THE FOOD THAT YOU ARE EATING  Goal is less than 2 g of sodium intake or less than 5 g of sodium chloride intake per day    Avoid nonsteroidal anti-inflammatory drugs such as Naprosyn, ibuprofen, Aleve, Advil, Celebrex, Meloxicam (Mobic) etc.  You can use Tylenol as needed if you do not have any liver condition to be concerned about    Avoid medications such as Sudafed or decongestants and antihistamines that contained the D component which is the decongestant. You can take antihistamines without the decongestant or D component. Try to avoid medications such as pantoprazole or  Protonix/Nexium or Esomeprazole)/Prilosec or omeprazole/Prevacid or lansoprazole/AcipHex or Rabeprazole. If you are able to, use Pepcid as this is safer for your kidneys. Try to exercise at least 30 minutes 3 days a week to begin with with an ultimate goal of 5 days a week for at least 30 minutes    Try to lose 10-20 lb by your next visit    Please do not drink more than 2 glasses of alcohol/wine on a daily basis as this may contribute to your high blood pressure.

## 2023-07-19 NOTE — LETTER
July 19, 2023     Wesley Oliver, [address] [facility] [address] [facility] [address]    Patient: Ferdinand Lesches   YOB: 1960   Date of Visit: 7/19/2023       Dear Dr. Beverly Garrido: Thank you for referring Gala Anna to me for evaluation. Below are my notes for this consultation. If you have questions, please do not hesitate to call me. I look forward to following your patient along with you. Sincerely,        Juhi Grace MD        CC: MD Juhi Cowan MD  7/19/2023 11:47 AM  Sign when Signing Visit  RENAL FOLLOW UP NOTE:.td    ASSESSMENT AND PLAN:  1.  CKD stage 3.:  Etiology:  Right radical nephrectomy July 15, 2016 for renal cell CA/hypertensive nephrosclerosis/arteriolar nephrosclerosis/diabetic nephropathy/?  Morbid obesity with? FSGS  Baseline creatinine:  new baseline appears to be from 1.8-as high as 2.4  Current creatinine:  1.55 at baseline  Urine protein creatinine ratio: 0.484 g at goal  UA microscopic no proteinuria, trace intact heme from 05/13/2020  Recommendations:  Treat hypertension-please see below  Treat dyslipidemia-please see below  Maintain proteinuria less than 1 g or as low as possible  Avoid nephrotoxic agents such as NSAIDs, patient counseled as such  Kidney smart referral this time     2. Volume:  Current volume:  Euvolemic  Treatment:  Euvolemic use furosemide 40 mg only as needed chest exam chronic changes asymptomatic     3. Hypertension:   Home blood pressures:    P.m.:   120/63  A.m.:    117/78  Heart rate:     Goal blood pressure:  Less than 130/80  Recommendations:    push nonmedical regimen including weight loss, and any form of exercise patient can not tolerate; avoidance of salt; patient counseled as such  Medication changes today:  No changes as patient appears to be at goal especially with occasional low diastolic readings     4.   Electrolytes:  Chronic metabolic alkalosis most likely from primary CO2 retention, stable at 29 5.  Mineral bone disorder:  Secondary to CKD:  Calcium/magnesium/phosphorus:  All acceptable  PTH intact: 22 at goal  Vitamin-D:  47 at goal     6. Dyslipidemia:  Goal LDL:  Less than 100  Current lipid profile: LDL 41/HDL 32/triglycerides 97  Recommendations: No changes as patient is at goal     7. Anemia:    -Current value  10.6 improved   -iron studies:  Adequate as of 7/13/2023  -B12 and folate deficiency being treated  -multiple myeloma was ruled out in January  -GI evaluation in January of 2018:   colonoscopy demonstrated internal/external hemorrhoids; also colonic polyps which were removed. There was a suboptimal colonic prep. Felt she may require repeat colonoscopy in 3-6 months. EGD demonstrated small hiatal hernia. GI evaluated the patient December 2022 because of anemia EGD demonstrated gastric duodenal AVMs requiring APC, follow-up secondary endoscopy because of dropping hemoglobin; colonoscopy demonstrated no active bleeding  Capsule endoscopy was recommended           8. Other problems:  Right radical nephrectomy for renal cell CA July 15, 2016  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 2, 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  Morbid obesity  Atrial fibrillation  Cellulitis of the left leg back in January treated with intravenous antibiotics  COPD on iron  EGD involving multiple joints  Hypothyroidism on supplement  History of gait dysfunction using motorized scooter to ambulate  Diabetes mellitus/diabetic neuropathy  Admission 8/2021 for abdominal pain-workup is CT/EGD/colonoscopy/push enteroscopy all negative except for gastritis-esophagitis; the patient's symptoms resolved spontaneously. Patient with a ventral hernia which she deferred in terms of surgical revision, will follow-up with surgery.   Admission to [facility] 12/11/2022 with abdominal wall cellulitis/anemia status posttransfusion  Colonoscopy 11/30/2022 demonstrated diverticula in the sigmoid colon otherwise normal colonoscopy  EGD with push enteroscopy was prematurely aborted secondary respiratory instability  Completed course of antibiotics including Keflex  Peak creatinine 2.19 on admission came down to 1.85  Admission December 3, 2022 with acute hypoxic hypercapnic respiratory failure: Felt to have multifocal pneumonia started on empiric antibiotics treated by pulmonary. Also GI evaluation for anemia please see above           GI health maintenance: 12/2/2022: Normal colonoscopy recommended repeat in 5 years which would be December 2027. PATIENT INSTRUCTIONS:    Patient Instructions   Visit summary:  -Overall kidney function doing well! - Blood pressure seems like it is doing well! Please use the left arm only for blood pressure readings  - I would change iron to just every other day it apparently gets better absorbed rather than daily  - Please contact your family physician regarding hair loss        1. Medication changes today:  Please take iron every other day        2. Please take 1 week a blood pressure readings prior to your next appointment    AS FOLLOWS  MORNING AND EVENING, SITTING AND STANDING as follows:  TAKE THE MORNING READINGS BEFORE ANY MEDICATIONS AND WHEN YOU ARE RELAXED FOR SEVERAL MINUTES  TAKE THE EVENING READINGS:  BETWEEN 7-10 P.M.; PRIOR TO ANY MEDICATIONS; AT LEAST IN OUR  FROM DINNER; AND CERTAINLY AFTER RELAXING FOR A FEW MINUTES  PLEASE INCLUDE HEART RATE WITH YOUR BLOOD PRESSURE READINGS  When taking standing readings, keep your arm supported at heart level and not dangling  Make sure you are sitting with your back supported and feet on the ground and do not cross your legs or feet  Make sure you have not taken any coffee or caffeine products or exercised or smoke cigarettes at least 30 minutes before taking your blood pressure  Then please mail these readings into the office    3. In 3 months:  Please go for nonfasting lab work but in the morning  Please take 1 week a blood pressure readings as outlined above and mail those into the office      4. Follow-up in 6-7 months  Please bring in 1 week a blood pressure readings morning evening, sitting and standing is outlined above  PLEASE BRING AN YOUR BLOOD PRESSURE MACHINE TO CORRELATE WITH THE OFFICE MACHINE AT THIS NEXT SCHEDULED VISIT  Please go for fasting lab work 1-2 weeks prior to your appointment      5. General instructions:  AVOID SALT BUT NOT ADDING AN READING LABELS TO MAKE SURE THERE IS LOW-SALT IN THE FOOD THAT YOU ARE EATING  Goal is less than 2 g of sodium intake or less than 5 g of sodium chloride intake per day    Avoid nonsteroidal anti-inflammatory drugs such as Naprosyn, ibuprofen, Aleve, Advil, Celebrex, Meloxicam (Mobic) etc.  You can use Tylenol as needed if you do not have any liver condition to be concerned about    Avoid medications such as Sudafed or decongestants and antihistamines that contained the D component which is the decongestant. You can take antihistamines without the decongestant or D component. Try to avoid medications such as pantoprazole or  Protonix/Nexium or Esomeprazole)/Prilosec or omeprazole/Prevacid or lansoprazole/AcipHex or Rabeprazole. If you are able to, use Pepcid as this is safer for your kidneys. Try to exercise at least 30 minutes 3 days a week to begin with with an ultimate goal of 5 days a week for at least 30 minutes    Try to lose 10-20 lb by your next visit    Please do not drink more than 2 glasses of alcohol/wine on a daily basis as this may contribute to your high blood pressure. Subjective:   See above regarding hospitalization  The patient overall is feeling well. No fevers, chills, or cough or colds.   Good appetite and good energy  No hematuria, dysuria, voiding symptoms or foamy urine  No gastrointestinal symptoms  No chest pain, chronic shortness of breath on home oxygen which is stable, leg swelling is excellent  No headaches, rare dizziness or lightheadedness if she gets up too quickly  Blood pressure medications:  Furosemide 40 mg daily in the morning as needed only she is only taken it twice in the last 6 months  Amlodipine 5 mg daily in the morning  Toprol-XL 50 mg twice a day      ROS:  See HPI, otherwise review of systems as completely reviewed with the patient are negative    Past Medical History:   Diagnosis Date   • Anemia    • Arthritis    • Cancer of kidney ([address])    • Chronic kidney disease    • Diabetes mellitus ([address])    • Disease of thyroid gland    • HLD (hyperlipidemia)    • Hypertension    • Ovarian cancer ([address])    • Pneumonia      Past Surgical History:   Procedure Laterality Date   • CHOLECYSTECTOMY     • CT GUIDED PERC DRAINAGE CATHETER PLACEMENT  5/16/2016   • GALLBLADDER SURGERY  05/01/2004   • HYSTERECTOMY  06/01/2018   • NEPHRECTOMY Right 08/02/2018     Family History   Problem Relation Age of Onset   • No Known Problems Mother    • No Known Problems Father       reports that she quit smoking about 12 years ago. Her smoking use included cigarettes. She has a 90.00 pack-year smoking history. She quit smokeless tobacco use about 11 years ago. She reports current drug use. Drug: Marijuana. She reports that she does not drink alcohol. I COMPLETELY REVIEWED THE PAST MEDICAL HISTORY/PAST SURGICAL HISTORY/SOCIAL HISTORY/FAMILY HISTORY/AND MEDICATIONS  AND UPDATED ALL    Objective:     Vitals:   BP sitting on left: 136/68 with a heart rate of 72 slightly irregular      Weight (last 2 days)       None          Wt Readings from Last 3 Encounters:   03/14/23 132 kg (290 lb 6.4 oz)   02/16/23 (!) 153 kg (338 lb)   02/15/23 (!) 193 kg (425 lb)       Body mass index is 49.85 kg/m².     Physical Exam: General:  No acute distress/morbidly obese on oxygen  Skin:  No acute rash  Eyes:  No scleral icterus, noninjected, no discharge from eyes  ENT:  Moist mucous membranes  Neck:  Supple, no jugular venous distention, trachea is midline, no lymphadenopathy and no thyromegaly  Back   No CVAT  Chest: Chronic left basilar crackles, and no dullness to percussion, good respiratory effort  CVS:  Regular rate and rhythm without a rub, or gallops or murmurs  Abdomen:  Obese, Soft and nontender with normal bowel sounds  Extremities:  No cyanosis and no edema, no arthritic changes, normal range of motion  Neuro:  Grossly intact  Psych:  Alert, oriented x3 and appropriate      Medications:    Current Outpatient Medications:   •  albuterol (2.5 mg/3 mL) 0.083 % nebulizer solution, INHALE CONTENTS OF 1 VIAL ( 3 MILLILITERS ) IN NEBULIZER BY MOUTH AND INTO THE LUNGS EVERY 6 HOURS IF NEEDED FOR WHEEZING OR SHORTNESS OF BREATH, Disp: 75 mL, Rfl: 5  •  albuterol (PROVENTIL HFA,VENTOLIN HFA) 90 mcg/act inhaler, inhale 2 puffs every 4 hours if needed for wheezing, Disp: , Rfl:   •  amLODIPine (NORVASC) 5 mg tablet, take 1 tablet by mouth once daily, Disp: 90 tablet, Rfl: 2  •  atorvastatin (LIPITOR) 10 mg tablet, Take 1 tablet (10 mg total) by mouth daily, Disp: 90 tablet, Rfl: 3  •  Blood Glucose Monitoring Suppl ([employer] Pro Glucose Meter) SAVANNAH, Use 2 (two) times a day Embrace glucometer, test twice daily, Disp: 1 each, Rfl: 0  •  cholecalciferol (VITAMIN D3) 400 units tablet, Take 1 tablet (400 Units total) by mouth daily, Disp: 100 tablet, Rfl: 4  •  famotidine (PEPCID) 20 mg tablet, Take 1 tablet (20 mg total) by mouth daily, Disp: 90 tablet, Rfl: 3  •  ferrous sulfate 325 (65 Fe) mg tablet, Take 1 tablet (325 mg total) by mouth daily with breakfast, Disp: 90 tablet, Rfl: 3  •  furosemide (LASIX) 40 mg tablet, Take 1 tablet (40 mg total) by mouth daily (Patient taking differently: Take 40 mg by mouth daily And and as needed dose), Disp: 90 tablet, Rfl: 3  •  hydrOXYzine HCL (ATARAX) 50 mg tablet, Take 1 tablet (50 mg total) by mouth daily at bedtime, Disp: 30 tablet, Rfl: 12  •  levothyroxine 100 mcg tablet, Take 1 tablet (100 mcg total) by mouth daily, Disp: 90 tablet, Rfl: 3  •  LORazepam (ATIVAN) 0.5 mg tablet, Take 1 tablet (0.5 mg total) by mouth 2 (two) times a day as needed for anxiety, Disp: 60 tablet, Rfl: 4  •  metoprolol tartrate (LOPRESSOR) 50 mg tablet, Take 1 tablet (50 mg total) by mouth 2 (two) times a day, Disp: 180 tablet, Rfl: 3  •  nystatin (MYCOSTATIN) cream, Apply topically 2 (two) times a day, Disp: 30 g, Rfl: 3  •  pantoprazole (PROTONIX) 40 mg tablet, Take 1 tablet (40 mg total) by mouth daily, Disp: 90 tablet, Rfl: 3  •  sertraline (Zoloft) 50 mg tablet, Take 1 tablet (50 mg total) by mouth daily, Disp: 30 tablet, Rfl: 5  •  umeclidinium-vilanterol (Anoro Ellipta) 62.5-25 mcg/actuation inhaler, Inhale 1 puff daily, Disp: 60 blister, Rfl: 5  •  warfarin (Coumadin) 1 mg tablet, Take 1 tablet (1 mg total) by mouth daily, Disp: 90 tablet, Rfl: 2  •  warfarin (COUMADIN) 3 mg tablet, Take 1 tablet (3 mg total) by mouth daily, Disp: 90 tablet, Rfl: 3  •  collagenase (SANTYL) ointment, Apply topically daily (Patient not taking: Reported on 5/26/2023), Disp: 15 g, Rfl: 1  •  Embrace Lancets Ultra Thin 30G MISC, Use 2 (two) times a day (Patient not taking: Reported on 7/19/2023), Disp: 100 each, Rfl: 5  •  glucose blood (Embrace Blood Glucose Test) test strip, Use as instructed to check sugar bid (Patient not taking: Reported on 5/26/2023), Disp: 100 strip, Rfl: 4    Lab, Imaging and other studies: I have personally reviewed pertinent labs.   Laboratory Results:  Results for orders placed or performed in visit on 07/14/23   PTH, Intact and Calcium   Result Value Ref Range    PTH, Intact TNP pg/mL    Calcium 8.8 8.6 - 10.4 mg/dL   Magnesium   Result Value Ref Range    Magnesium, Serum 1.8 1.5 - 2.5 mg/dL   Phosphorus   Result Value Ref Range    Phosphorus, Serum 3.5 2.5 - 4.5 mg/dL   TIBC   Result Value Ref Range    Iron, Serum 73 45 - 160 mcg/dL    Total Iron Binding Capacity (TIBC) 239 (L) 250 - 450 mcg/dL (calc)    Iron Saturation 31 16 - 45 % (calc) Comprehensive metabolic panel   Result Value Ref Range    Glucose, Random 83 65 - 99 mg/dL    BUN 25 7 - 25 mg/dL    Creatinine 1.55 (H) 0.50 - 1.05 mg/dL    eGFR 37 (L) > OR = 60 mL/min/1.73m2    SL AMB BUN/CREATININE RATIO 16 6 - 22 (calc)    Sodium 142 135 - 146 mmol/L    Potassium 4.5 3.5 - 5.3 mmol/L    Chloride 101 98 - 110 mmol/L    CO2 29 20 - 32 mmol/L    Calcium 8.8 8.6 - 10.4 mg/dL    Protein, Total 6.6 6.1 - 8.1 g/dL    Albumin 3.1 (L) 3.6 - 5.1 g/dL    Globulin 3.5 1.9 - 3.7 g/dL (calc)    Albumin/Globulin Ratio 0.9 (L) 1.0 - 2.5 (calc)    TOTAL BILIRUBIN 0.3 0.2 - 1.2 mg/dL    Alkaline Phosphatase 162 (H) 37 - 153 U/L    AST 14 10 - 35 U/L    ALT 7 6 - 29 U/L   Creatine Kinase, Total   Result Value Ref Range    Creatine Kinase, Total 21 (L) 29 - 143 U/L   CBC and differential   Result Value Ref Range    White Blood Cell Count TNP Thousand/uL   Ferritin   Result Value Ref Range    Ferritin 102 16 - 288 ng/mL   Protein, Total w/Creat, Random Urine   Result Value Ref Range    Creatinine, Urine TNP mg/dL       Results from last 7 days   Lab Units 07/14/23  2041 07/13/23  1003   WHITE BLOOD CELL COUNT. Thousand/uL TNP 6.9   HEMOGLOBIN. g/dL  --  10.6*   HEMATOCRIT. %  --  34.1*   PLATELETS. Thousand/uL  --  276   POTASSIUM mmol/L 4.5 4.4   CHLORIDE mmol/L 101 103   CO2 mmol/L 29 33*   BUN mg/dL 25 25   CREATININE mg/dL 1.55* 1.60*   CALCIUM mg/dL 8.8  8.8 8.9  8.9   MAGNESIUM mg/dL 1.8 1.7         Radiology review:   chest X-ray    Ultrasound      Portions of the record may have been created with voice recognition software. Occasional wrong word or "sound a like" substitutions may have occurred due to the inherent limitations of voice recognition software. Read the chart carefully and recognize, using context, where substitutions have occurred.

## 2023-07-26 ENCOUNTER — TELEPHONE (OUTPATIENT)
Dept: FAMILY MEDICINE CLINIC | Facility: CLINIC | Age: 63
End: 2023-07-26

## 2023-07-26 DIAGNOSIS — J44.9 COPD WITH ASTHMA (HCC): Primary | Chronic | ICD-10-CM

## 2023-07-26 RX ORDER — IPRATROPIUM BROMIDE AND ALBUTEROL SULFATE 2.5; .5 MG/3ML; MG/3ML
3 SOLUTION RESPIRATORY (INHALATION) EVERY 8 HOURS PRN
Qty: 60 ML | Refills: 3 | Status: SHIPPED | OUTPATIENT
Start: 2023-07-26

## 2023-07-26 NOTE — TELEPHONE ENCOUNTER
Pt's care taker called in requesting for Ipratropium and Albuteral.was upset that patient kept receiving calls from [address] [facility]. I escalated call to Jose Owens as Caretaker was extremely argumentive.

## 2023-08-03 LAB — INR PPP: 2 (ref 0.84–1.19)

## 2023-08-04 ENCOUNTER — ANTICOAG VISIT (OUTPATIENT)
Dept: FAMILY MEDICINE CLINIC | Facility: CLINIC | Age: 63
End: 2023-08-04

## 2023-08-04 ENCOUNTER — TELEPHONE (OUTPATIENT)
Dept: FAMILY MEDICINE CLINIC | Facility: CLINIC | Age: 63
End: 2023-08-04

## 2023-08-04 LAB
INR PPP: 2
PROTHROMBIN TIME: 19.6 SEC (ref 9–11.5)

## 2023-08-04 NOTE — TELEPHONE ENCOUNTER
----- Message from Anthony Baxter, [address] [place] [address] sent at 8/4/2023  9:09 AM EDT -----  INR in range. Cont current coumadin dose.  INR in 1 month please

## 2023-08-04 NOTE — PROGRESS NOTES
Called and spoke to Weston Jenkins, advised patient is to remain on same dose of coumadin and recheck INR in 1 month

## 2023-08-11 DIAGNOSIS — F41.9 ANXIETY: ICD-10-CM

## 2023-08-16 DIAGNOSIS — F41.9 ANXIETY: ICD-10-CM

## 2023-08-21 DIAGNOSIS — F41.0 PANIC ATTACK: ICD-10-CM

## 2023-08-22 DIAGNOSIS — F41.0 PANIC ATTACK: ICD-10-CM

## 2023-08-22 RX ORDER — LORAZEPAM 0.5 MG/1
0.5 TABLET ORAL 2 TIMES DAILY PRN
Qty: 60 TABLET | OUTPATIENT
Start: 2023-08-22

## 2023-08-22 RX ORDER — LORAZEPAM 0.5 MG/1
0.5 TABLET ORAL 2 TIMES DAILY PRN
Qty: 60 TABLET | Refills: 0 | Status: SHIPPED | OUTPATIENT
Start: 2023-08-22

## 2023-08-22 NOTE — TELEPHONE ENCOUNTER
Pt caretaker called in requesting refill on Ativan -- previous was rejected as pt last appt was on may -- pt has appt sched for sept.

## 2023-08-22 NOTE — TELEPHONE ENCOUNTER
Called and spoke to pt's care taker whom said pt. Already has scheduled an appt for 9/20, still needs the refill.

## 2023-08-29 LAB — INR PPP: 1.8 (ref 0.84–1.19)

## 2023-08-30 ENCOUNTER — OFFICE VISIT (OUTPATIENT)
Dept: PULMONOLOGY | Facility: CLINIC | Age: 63
End: 2023-08-30
Payer: MEDICARE

## 2023-08-30 VITALS
OXYGEN SATURATION: 94 % | WEIGHT: 290 LBS | BODY MASS INDEX: 49.51 KG/M2 | SYSTOLIC BLOOD PRESSURE: 110 MMHG | HEART RATE: 82 BPM | DIASTOLIC BLOOD PRESSURE: 82 MMHG | HEIGHT: 64 IN

## 2023-08-30 DIAGNOSIS — J96.11 CHRONIC RESPIRATORY FAILURE WITH HYPOXIA (HCC): ICD-10-CM

## 2023-08-30 DIAGNOSIS — J44.9 COPD WITH ASTHMA (HCC): Primary | ICD-10-CM

## 2023-08-30 DIAGNOSIS — J96.12 CHRONIC RESPIRATORY FAILURE WITH HYPERCAPNIA (HCC): ICD-10-CM

## 2023-08-30 LAB
INR PPP: 1.8
PROTHROMBIN TIME: 18.4 SEC (ref 9–11.5)

## 2023-08-30 PROCEDURE — 94010 BREATHING CAPACITY TEST: CPT | Performed by: INTERNAL MEDICINE

## 2023-08-30 PROCEDURE — 99214 OFFICE O/P EST MOD 30 MIN: CPT | Performed by: INTERNAL MEDICINE

## 2023-08-30 NOTE — ASSESSMENT & PLAN NOTE
This patient did not have room air hypoxemia in the office today. Measured saturation 92 to 94% on room air after 10 to 15 minutes. Patient advised that she does not need oxygen during the day but we will arrange for her to have overnight oximetry on room air to see if she qualifies for oxygen at night.

## 2023-08-30 NOTE — PROGRESS NOTES
Assessment/Plan:    Chronic respiratory failure with hypercapnia (HCC)  This patient has documented hypercapnia on several occasions. She actually has been on oxygen for quite a long time but her saturations today on room air are 92 to 94%. Therefore I told her she does not need oxygen during the day. We will need to do overnight oximetry to decide if she qualifies for nocturnal oxygen and to see if there is a pattern that might indicate sleep apnea. Because of her disability it would very difficult if not impossible to get her into the sleep lab for formal testing. Patient tells me that she had formal sleep testing more than 20 years ago at [facility] and those results are lost.  Spirometry today indicates decreased vital capacity and slightly decreased obstructive index indicating that there might be some airflow obstruction and she continues on short acting bronchodilators for this. Chronic respiratory failure with hypoxia (HCC)  This patient did not have room air hypoxemia in the office today. Measured saturation 92 to 94% on room air after 10 to 15 minutes. Patient advised that she does not need oxygen during the day but we will arrange for her to have overnight oximetry on room air to see if she qualifies for oxygen at night. Diagnoses and all orders for this visit:    COPD with asthma ([address])  -     POCT spirometry    Chronic respiratory failure with hypercapnia (HCC)    Chronic respiratory failure with hypoxia (HCC)          Subjective:      Patient ID: Jamison Hamm is a 58 y.o. female. This patient came to the office for evaluation of chronic hypercapnic respiratory failure. We had received several messages from the patient asking for different types of oxygen equipment over the last several months. The patient had never been to this office and was last seen by the pulmonary service while hospitalized in December 2022. I last spoke to this patient on the phone in 2020.   She is disabled using an electric wheelchair. She states that she is not able to walk because of leg problems and back problems. She is seriously overweight. She does get dyspnea on exertion but her level of activity is extremely low. We reviewed her entire history because she has never had adequate diagnostic testing relative to her chronic hypercapnia. She states that she had asthma as a child and teenager. She smoked until age 51. Does not have wheezing or chronic cough. Uses nebulized albuterol and ipratropium on a regular basis but does not feel dramatically better after these meds. Was prescribed Anoro after hospitalization in December but stopped taking this because she finds inhaling the powder uncomfortable. Never had PFTs that I can determine. Spirometry in the office today does demonstrate decreased vital capacity and borderline low obstructive index suggesting some element of airflow obstruction. She had sleep testing perhaps 20 or more years ago but is not aware of the result. Ever was offered CPAP. Apparently tested for BiPAP while in the hospital but I do not know what testing was done and patient did not qualify. The last PCO2 measured in the hospital was 64. Patient not known to snore and no episodes of obstructed airway events have been observed. Patient does not awaken with shortness of breath. Does have significant nocturia. No headache. Does feel that her sleep is not restful but she has an irregular sleep pattern. Room air saturations off oxygen for 10 to 15 minutes ranged from 92 to 94%. This was a prolonged visit reviewing all of patient's recent history and symptoms. 40 minutes.       The following portions of the patient's history were reviewed and updated as appropriate: allergies, current medications, past family history, past medical history, past social history, past surgical history and problem list.    Review of Systems   Constitutional: Negative for activity change and unexpected weight change. Respiratory: Positive for shortness of breath. Negative for chest tightness and wheezing. Cardiovascular: Negative for chest pain, palpitations and leg swelling. Genitourinary: Positive for enuresis. Musculoskeletal: Positive for arthralgias and back pain. Neurological: Negative for headaches. Objective:      /82 (BP Location: Left arm, Patient Position: Sitting, Cuff Size: Large)   Pulse 82   Ht 5' 4" (1.626 m)   Wt 132 kg (290 lb) Comment: stated  SpO2 96%   BMI 49.78 kg/m²          Physical Exam  Vitals reviewed. Constitutional:       General: She is not in acute distress. Appearance: She is obese. She is not ill-appearing. Cardiovascular:      Rate and Rhythm: Normal rate and regular rhythm. Pulses:           Radial pulses are 0 on the right side and 0 on the left side. Heart sounds: Heart sounds are distant. No murmur heard. Pulmonary:      Effort: Pulmonary effort is normal.      Breath sounds: Normal breath sounds. No wheezing or rhonchi. Musculoskeletal:      Cervical back: No rigidity or tenderness. Right lower leg: No edema. Left lower leg: No edema. Lymphadenopathy:      Cervical: No cervical adenopathy. Skin:     General: Skin is warm and dry. Neurological:      Mental Status: She is alert and oriented to person, place, and time.    Psychiatric:         Mood and Affect: Mood normal.         Behavior: Behavior normal.

## 2023-08-30 NOTE — PATIENT INSTRUCTIONS
You dont need oxygen during the daytime. Overnight oxygen testing at home- we'll check how to accomplish this. Albuterol plus ipratropium 4 times daily via nebulizer.

## 2023-08-30 NOTE — ASSESSMENT & PLAN NOTE
This patient has documented hypercapnia on several occasions. She actually has been on oxygen for quite a long time but her saturations today on room air are 92 to 94%. Therefore I told her she does not need oxygen during the day. We will need to do overnight oximetry to decide if she qualifies for nocturnal oxygen and to see if there is a pattern that might indicate sleep apnea. Because of her disability it would very difficult if not impossible to get her into the sleep lab for formal testing. Patient tells me that she had formal sleep testing more than 20 years ago at [facility] and those results are lost.  Spirometry today indicates decreased vital capacity and slightly decreased obstructive index indicating that there might be some airflow obstruction and she continues on short acting bronchodilators for this.

## 2023-08-31 ENCOUNTER — ANTICOAG VISIT (OUTPATIENT)
Dept: FAMILY MEDICINE CLINIC | Facility: CLINIC | Age: 63
End: 2023-08-31

## 2023-09-18 DIAGNOSIS — J96.12 CHRONIC RESPIRATORY FAILURE WITH HYPERCAPNIA (HCC): Primary | ICD-10-CM

## 2023-09-18 DIAGNOSIS — B37.2 SKIN CANDIDIASIS: ICD-10-CM

## 2023-09-18 DIAGNOSIS — K21.9 GASTROESOPHAGEAL REFLUX DISEASE WITHOUT ESOPHAGITIS: ICD-10-CM

## 2023-09-18 DIAGNOSIS — F41.9 ANXIETY: ICD-10-CM

## 2023-09-18 RX ORDER — HYDROXYZINE 50 MG/1
50 TABLET, FILM COATED ORAL
Qty: 30 TABLET | Refills: 12 | Status: SHIPPED | OUTPATIENT
Start: 2023-09-18 | End: 2023-09-20 | Stop reason: SDUPTHER

## 2023-09-18 RX ORDER — ALBUTEROL SULFATE 90 UG/1
1 AEROSOL, METERED RESPIRATORY (INHALATION) EVERY 4 HOURS PRN
Qty: 18 G | Refills: 1 | Status: SHIPPED | OUTPATIENT
Start: 2023-09-18

## 2023-09-18 RX ORDER — NYSTATIN 100000 U/G
CREAM TOPICAL 2 TIMES DAILY
Qty: 30 G | Refills: 3 | Status: SHIPPED | OUTPATIENT
Start: 2023-09-18

## 2023-09-18 RX ORDER — FAMOTIDINE 20 MG/1
20 TABLET, FILM COATED ORAL DAILY
Qty: 90 TABLET | Refills: 3 | Status: SHIPPED | OUTPATIENT
Start: 2023-09-18 | End: 2023-09-20 | Stop reason: SDUPTHER

## 2023-09-18 NOTE — TELEPHONE ENCOUNTER
Patient care giver called today. Patient is in need of refills ASAP. She states that [facility] closed and her prescriptions were transferred to [place] however she is unable to get there to pick them up. She wants to use [facility] instead. List of medications she needs at this time are   Sertraline  Nystatin cream  Hydroxyzine  Albuterol inhaler   Famotidine  [facility] updated in chart.

## 2023-09-20 ENCOUNTER — OFFICE VISIT (OUTPATIENT)
Dept: FAMILY MEDICINE CLINIC | Facility: CLINIC | Age: 63
End: 2023-09-20
Payer: MEDICARE

## 2023-09-20 VITALS
HEART RATE: 65 BPM | HEIGHT: 64 IN | SYSTOLIC BLOOD PRESSURE: 108 MMHG | TEMPERATURE: 97.3 F | BODY MASS INDEX: 49.78 KG/M2 | DIASTOLIC BLOOD PRESSURE: 70 MMHG | RESPIRATION RATE: 18 BRPM | OXYGEN SATURATION: 98 %

## 2023-09-20 DIAGNOSIS — E03.8 OTHER SPECIFIED HYPOTHYROIDISM: ICD-10-CM

## 2023-09-20 DIAGNOSIS — E78.5 DYSLIPIDEMIA: ICD-10-CM

## 2023-09-20 DIAGNOSIS — I48.0 PAROXYSMAL ATRIAL FIBRILLATION (HCC): Primary | Chronic | ICD-10-CM

## 2023-09-20 DIAGNOSIS — D50.8 IRON DEFICIENCY ANEMIA SECONDARY TO INADEQUATE DIETARY IRON INTAKE: ICD-10-CM

## 2023-09-20 DIAGNOSIS — F41.0 PANIC ATTACK: ICD-10-CM

## 2023-09-20 DIAGNOSIS — F41.9 ANXIETY: ICD-10-CM

## 2023-09-20 DIAGNOSIS — L30.1 DYSHIDROTIC ECZEMA: ICD-10-CM

## 2023-09-20 DIAGNOSIS — K21.9 GASTROESOPHAGEAL REFLUX DISEASE WITHOUT ESOPHAGITIS: ICD-10-CM

## 2023-09-20 DIAGNOSIS — Z79.01 CHRONIC ANTICOAGULATION: ICD-10-CM

## 2023-09-20 PROCEDURE — 99214 OFFICE O/P EST MOD 30 MIN: CPT | Performed by: NURSE PRACTITIONER

## 2023-09-20 RX ORDER — FAMOTIDINE 20 MG/1
20 TABLET, FILM COATED ORAL DAILY
Qty: 90 TABLET | Refills: 3 | Status: SHIPPED | OUTPATIENT
Start: 2023-09-20

## 2023-09-20 RX ORDER — LORAZEPAM 0.5 MG/1
0.5 TABLET ORAL 2 TIMES DAILY PRN
Qty: 60 TABLET | Refills: 2 | Status: SHIPPED | OUTPATIENT
Start: 2023-09-20

## 2023-09-20 RX ORDER — FERROUS SULFATE 325(65) MG
325 TABLET ORAL
Qty: 45 TABLET | Refills: 3 | Status: SHIPPED | OUTPATIENT
Start: 2023-09-20

## 2023-09-20 RX ORDER — HYDROXYZINE 50 MG/1
50 TABLET, FILM COATED ORAL
Qty: 90 TABLET | Refills: 3 | Status: SHIPPED | OUTPATIENT
Start: 2023-09-20

## 2023-09-20 RX ORDER — ATORVASTATIN CALCIUM 10 MG/1
10 TABLET, FILM COATED ORAL DAILY
Qty: 90 TABLET | Refills: 3 | Status: SHIPPED | OUTPATIENT
Start: 2023-09-20

## 2023-09-20 RX ORDER — METOPROLOL TARTRATE 50 MG/1
50 TABLET, FILM COATED ORAL 2 TIMES DAILY
Qty: 180 TABLET | Refills: 3 | Status: SHIPPED | OUTPATIENT
Start: 2023-09-20

## 2023-09-20 RX ORDER — LEVOTHYROXINE SODIUM 0.1 MG/1
100 TABLET ORAL DAILY
Qty: 90 TABLET | Refills: 3 | Status: SHIPPED | OUTPATIENT
Start: 2023-09-20

## 2023-09-20 RX ORDER — TRIAMCINOLONE ACETONIDE 1 MG/G
OINTMENT TOPICAL 2 TIMES DAILY
Qty: 160 G | Refills: 4 | Status: SHIPPED | OUTPATIENT
Start: 2023-09-20

## 2023-09-20 NOTE — PROGRESS NOTES
Assessment/Plan:    1. Paroxysmal atrial fibrillation (HCC)  -     metoprolol tartrate (LOPRESSOR) 50 mg tablet; Take 1 tablet (50 mg total) by mouth 2 (two) times a day  -     PT/INR Home Monitor    2. Dyshidrotic eczema  -     triamcinolone (KENALOG) 0.1 % ointment; Apply topically 2 (two) times a day    3. Anxiety  -     hydrOXYzine HCL (ATARAX) 50 mg tablet; Take 1 tablet (50 mg total) by mouth daily at bedtime  -     sertraline (ZOLOFT) 50 mg tablet; Take 1 tablet (50 mg total) by mouth daily    4. Dyslipidemia  -     atorvastatin (LIPITOR) 10 mg tablet; Take 1 tablet (10 mg total) by mouth daily    5. Iron deficiency anemia secondary to inadequate dietary iron intake  -     ferrous sulfate 325 (65 Fe) mg tablet; Take 1 tablet (325 mg total) by mouth daily with breakfast Every other day    6. Gastroesophageal reflux disease without esophagitis  -     famotidine (PEPCID) 20 mg tablet; Take 1 tablet (20 mg total) by mouth daily    7. Other specified hypothyroidism  -     levothyroxine 100 mcg tablet; Take 1 tablet (100 mcg total) by mouth daily    8. Panic attack  -     LORazepam (ATIVAN) 0.5 mg tablet; Take 1 tablet (0.5 mg total) by mouth 2 (two) times a day as needed for anxiety    9. Chronic anticoagulation  -     PT/INR Home Monitor        The case discussed with patient using patient centered shared decision making. The patient was counseled regarding instructions for management,-- risk factor reductions,-- prognosis,-- impressions,-- risks and benefits of treatment options,-- importance of compliance with treatment. I have reviewed the instructions with the patient, answering all questions to her satisfaction. 1.  Hypertension  Controlled at present    2.   Atrial fibrillation rate controlled  Continue Coumadin  Patient has difficulty getting blood draws in setting of ambulatory difficulties, wheelchair-bound  She had supratherapeutic INR of 14 approximately 6 months ago  For the sake of safety and compliance, home INR monitor medically necessary for Coumadin management    3. Other chronic conditions-CKD, COPD,   Controlled  Comanaged with specialty    Patient clinically stable today  Return to office in 3 months and as needed            Return in about 3 months (around 12/20/2023). I have spent a total time of 35 minutes on 09/21/23 in caring for this patient including Diagnostic results, Prognosis, Risks and benefits of tx options, Instructions for management, Patient and family education, Importance of tx compliance, Risk factor reductions, Impressions, Counseling / Coordination of care, Documenting in the medical record, Reviewing / ordering tests, medicine, procedures  , Obtaining or reviewing history   and Communicating with other healthcare professionals . Subjective:      Patient ID: Krissy Abraham is a 58 y.o. female. Chief Complaint   Patient presents with   • Follow-up     4 month       Here for chronic condition follow up  Arrives with significant other  Uses motorized wheelchair     Recent labs performed for nephrology. CKD, cbc stable    Is doing well overall  Compliant with plan of care  Has no complaint of today    Reviewed medical history in detail. Changes to medical record changed where appropriate. Reviewed medications. Patient taking as prescribed. Tolerating well. Denies Side effects. Reviewed recent visits with specialists co-managing chronic conditions. The following portions of the patient's history were reviewed and updated as appropriate: allergies, current medications, past family history, past medical history, past social history, past surgical history and problem list.    Review of Systems   Constitutional: Positive for fatigue. Negative for fever. HENT: Negative for trouble swallowing. Respiratory: Positive for shortness of breath. Negative for cough. Oxygen dependant   Cardiovascular: Positive for leg swelling.  Negative for chest pain and palpitations. Gastrointestinal: Positive for nausea. Negative for abdominal pain. Genitourinary: Negative for hematuria. Musculoskeletal: Positive for arthralgias and gait problem. Skin: Negative for pallor and wound. Neurological: Positive for weakness. Negative for dizziness and headaches. Hematological: Does not bruise/bleed easily. Psychiatric/Behavioral: Positive for dysphoric mood. Negative for suicidal ideas. The patient is nervous/anxious.           Current Outpatient Medications   Medication Sig Dispense Refill   • albuterol (2.5 mg/3 mL) 0.083 % nebulizer solution INHALE CONTENTS OF 1 VIAL ( 3 MILLILITERS ) IN NEBULIZER BY MOUTH AND INTO THE LUNGS EVERY 6 HOURS IF NEEDED FOR WHEEZING OR SHORTNESS OF BREATH 75 mL 5   • albuterol (PROVENTIL HFA,VENTOLIN HFA) 90 mcg/act inhaler Inhale 1 puff every 4 (four) hours as needed for wheezing 18 g 1   • amLODIPine (NORVASC) 5 mg tablet take 1 tablet by mouth once daily 90 tablet 2   • atorvastatin (LIPITOR) 10 mg tablet Take 1 tablet (10 mg total) by mouth daily 90 tablet 3   • Blood Glucose Monitoring Suppl ("[employer]"ace Pro Glucose Meter) SAVANNAH Use 2 (two) times a day KLab glucometer, test twice daily 1 each 0   • cholecalciferol (VITAMIN D3) 400 units tablet Take 1 tablet (400 Units total) by mouth daily 100 tablet 4   • famotidine (PEPCID) 20 mg tablet Take 1 tablet (20 mg total) by mouth daily 90 tablet 3   • ferrous sulfate 325 (65 Fe) mg tablet Take 1 tablet (325 mg total) by mouth daily with breakfast Every other day 45 tablet 3   • furosemide (LASIX) 40 mg tablet Take 1 tablet (40 mg total) by mouth daily (Patient taking differently: Take 40 mg by mouth daily And and as needed dose) 90 tablet 3   • hydrOXYzine HCL (ATARAX) 50 mg tablet Take 1 tablet (50 mg total) by mouth daily at bedtime 90 tablet 3   • ipratropium-albuterol (DUO-NEB) 0.5-2.5 mg/3 mL nebulizer solution Take 3 mL by nebulization every 8 (eight) hours as needed for wheezing or shortness of breath 60 mL 3   • levothyroxine 100 mcg tablet Take 1 tablet (100 mcg total) by mouth daily 90 tablet 3   • LORazepam (ATIVAN) 0.5 mg tablet Take 1 tablet (0.5 mg total) by mouth 2 (two) times a day as needed for anxiety 60 tablet 2   • metoprolol tartrate (LOPRESSOR) 50 mg tablet Take 1 tablet (50 mg total) by mouth 2 (two) times a day 180 tablet 3   • nystatin (MYCOSTATIN) cream Apply topically 2 (two) times a day 30 g 3   • pantoprazole (PROTONIX) 40 mg tablet Take 1 tablet (40 mg total) by mouth daily 90 tablet 3   • sertraline (ZOLOFT) 50 mg tablet Take 1 tablet (50 mg total) by mouth daily 90 tablet 3   • triamcinolone (KENALOG) 0.1 % ointment Apply topically 2 (two) times a day 160 g 4   • warfarin (Coumadin) 1 mg tablet Take 1 tablet (1 mg total) by mouth daily (Patient taking differently: Take 1 mg by mouth daily Monday,Wednesday, Friday 3 mg Sunday, Saturday, Tuesday, Thursday 1 mg) 90 tablet 2   • warfarin (COUMADIN) 3 mg tablet Take 1 tablet (3 mg total) by mouth daily 90 tablet 3   • collagenase (SANTYL) ointment Apply topically daily (Patient not taking: Reported on 5/26/2023) 15 g 1   • Embrace Lancets Ultra Thin 30G MISC Use 2 (two) times a day (Patient not taking: Reported on 7/19/2023) 100 each 5   • glucose blood (Embrace Blood Glucose Test) test strip Use as instructed to check sugar bid (Patient not taking: Reported on 5/26/2023) 100 strip 4     No current facility-administered medications for this visit.        Patient Active Problem List   Diagnosis   • Hypertensive chronic kidney disease with stage 1 through stage 4 chronic kidney disease, or unspecified chronic kidney disease   • Persistent proteinuria   • Iron deficiency   • Dyslipidemia   • Hyperparathyroidism ([address])   • Cellulitis   • H/O right nephrectomy   • Paroxysmal atrial fibrillation (HCC)   • COPD with asthma ([address])   • Hypothyroid   • Chronic constipation   • GERD (gastroesophageal reflux disease)   • Anxiety   • Secondary hyperparathyroidism of renal origin ([address])   • Morbid obesity with BMI of 50.0-59.9, adult (HCC)   • Depression, recurrent (HCC)   • Pre-ulcerative corn or callous   • Cardiomegaly   • Abnormal CT scan   • Panniculus   • Bilateral pleural effusion   • Panniculitis   • History of hysterectomy   • CKD (chronic kidney disease)   • Anemia in stage 3b chronic kidney disease (HCC)   • Chronic respiratory failure with hypoxia (HCC)   • Chronic respiratory failure with hypercapnia (HCC)   • Chronic anticoagulation       Objective:    /70 (BP Location: Left arm, Patient Position: Sitting, Cuff Size: Large)   Pulse 65   Temp (!) 97.3 °F (36.3 °C) (Temporal)   Resp 18   Ht 5' 4" (1.626 m)   SpO2 98%   BMI 49.78 kg/m²        Physical Exam  Vitals and nursing note reviewed. Constitutional:       General: She is not in acute distress. Appearance: Normal appearance. She is obese. Comments: Chronically ill female presents in motorized wheelchair  She is using oxygen 3 lpm continuously     HENT:      Head: Normocephalic and atraumatic. Neck:      Vascular: No carotid bruit. Cardiovascular:      Rate and Rhythm: Normal rate. Rhythm irregular. Pulses:           Radial pulses are 2+ on the right side and 2+ on the left side. Posterior tibial pulses are 1+ on the right side and 1+ on the left side. Heart sounds: Normal heart sounds. Comments: Distant heart tones due to body habitus  Pulmonary:      Effort: Pulmonary effort is normal.      Breath sounds: Normal breath sounds. Comments: Distant breath sounds due to body habitus  Abdominal:      General: Bowel sounds are normal.      Palpations: Abdomen is soft. Tenderness: There is no abdominal tenderness. Musculoskeletal:      Right lower le+ Pitting Edema present. Left lower le+ Pitting Edema present. Lymphadenopathy:      Cervical: No cervical adenopathy. Skin:     General: Skin is warm and dry. Coloration: Skin is not pale. Comments: Lower extremities with pvd   Skin intact  No signs of cellulitis    Neurological:      General: No focal deficit present. Mental Status: She is alert. Mental status is at baseline. Sensory: Sensory deficit present. Motor: Weakness present.       Gait: Gait abnormal.      Comments: Unable to stand   Wheel chair bound   Psychiatric:         Mood and Affect: Mood normal.         Behavior: Behavior normal.                TULIO Flannery

## 2023-09-28 NOTE — ASSESSMENT & PLAN NOTE
Lab Results   Component Value Date    HGBA1C 7 0 (H) 11/01/2022       Recent Labs     11/24/22  0802 11/24/22  1124 11/24/22  1623 11/24/22 2116   POCGLU 175* 224* 255* 259*       Blood Sugar Average: Last 72 hrs:    · ACHS glucose checks and SSI for goal glucose 140-180 Recommendations (Free Text): Instructions: Monthly self-skin checks to monitor for any changes in moles are recommended.\\nExpectations: Benign Nevi are pigmented nests of cells within the skin. No treatment is necessary.\\nContact Office if: Any moles change in size, shape or color; itch, burn or bleed.\\nABCDE of melanoma discussed for new patients. Detail Level: Zone Recommendation Preamble: The following recommendations were made during the visit:

## 2023-10-03 LAB
BUN SERPL-MCNC: 24 MG/DL (ref 7–25)
BUN/CREAT SERPL: 13 (CALC) (ref 6–22)
CALCIUM SERPL-MCNC: 9.2 MG/DL (ref 8.6–10.4)
CHLORIDE SERPL-SCNC: 103 MMOL/L (ref 98–110)
CO2 SERPL-SCNC: 30 MMOL/L (ref 20–32)
CREAT SERPL-MCNC: 1.79 MG/DL (ref 0.5–1.05)
CREAT UR-MCNC: 55 MG/DL (ref 20–275)
ERYTHROCYTE [DISTWIDTH] IN BLOOD BY AUTOMATED COUNT: 14.4 % (ref 11–15)
GFR/BSA.PRED SERPLBLD CYS-BASED-ARV: 31 ML/MIN/1.73M2
GLUCOSE SERPL-MCNC: 103 MG/DL (ref 65–99)
HCT VFR BLD AUTO: 36.7 % (ref 35–45)
HGB BLD-MCNC: 11.9 G/DL (ref 11.7–15.5)
MAGNESIUM SERPL-MCNC: 1.9 MG/DL (ref 1.5–2.5)
MCH RBC QN AUTO: 30.1 PG (ref 27–33)
MCHC RBC AUTO-ENTMCNC: 32.4 G/DL (ref 32–36)
MCV RBC AUTO: 92.9 FL (ref 80–100)
PLATELET # BLD AUTO: 320 THOUSAND/UL (ref 140–400)
PMV BLD REES-ECKER: 9.7 FL (ref 7.5–12.5)
POTASSIUM SERPL-SCNC: 4.5 MMOL/L (ref 3.5–5.3)
PROT UR-MCNC: 22 MG/DL (ref 5–24)
PROT/CREAT UR: 0.4 MG/MG CREAT (ref 0.02–0.18)
PROT/CREAT UR: 400 MG/G CREAT (ref 24–184)
RBC # BLD AUTO: 3.95 MILLION/UL (ref 3.8–5.1)
SODIUM SERPL-SCNC: 141 MMOL/L (ref 135–146)
WBC # BLD AUTO: 7.9 THOUSAND/UL (ref 3.8–10.8)

## 2023-10-05 ENCOUNTER — TELEPHONE (OUTPATIENT)
Dept: NEPHROLOGY | Facility: CLINIC | Age: 63
End: 2023-10-05

## 2023-10-05 NOTE — TELEPHONE ENCOUNTER
----- Message from Brendan Meadows PA-C sent at 10/5/2023  2:32 PM EDT -----  Labs reviewed. Please call patient let her know her labs and renal function are stable.   Follow-up in January with repeat labs as scheduled

## 2023-10-05 NOTE — RESULT ENCOUNTER NOTE
Labs reviewed. Please call patient let her know her labs and renal function are stable.   Follow-up in January with repeat labs as scheduled

## 2023-10-05 NOTE — TELEPHONE ENCOUNTER
Called and spoke to the patient's caregiver, Marquez Brown, to relay the message above. Marquez Brown expressed understanding and had no further questions or concerns at this time.

## 2023-10-14 DIAGNOSIS — D50.8 IRON DEFICIENCY ANEMIA SECONDARY TO INADEQUATE DIETARY IRON INTAKE: ICD-10-CM

## 2023-10-16 RX ORDER — FERROUS SULFATE 325(65) MG
325 TABLET ORAL
Qty: 45 TABLET | Refills: 1 | Status: SHIPPED | OUTPATIENT
Start: 2023-10-16

## 2023-11-08 ENCOUNTER — TELEPHONE (OUTPATIENT)
Age: 63
End: 2023-11-08

## 2023-11-08 NOTE — TELEPHONE ENCOUNTER
Gaby from [employer] called to request a House call form with the INR script attached be faxed to her at [phone]. She already susan it, but needs the forms filled out. Please advise.

## 2023-11-09 ENCOUNTER — TELEPHONE (OUTPATIENT)
Dept: NEPHROLOGY | Facility: CLINIC | Age: 63
End: 2023-11-09

## 2023-11-09 DIAGNOSIS — N18.32 STAGE 3B CHRONIC KIDNEY DISEASE (HCC): Primary | ICD-10-CM

## 2023-11-09 LAB
BUN SERPL-MCNC: 27 MG/DL (ref 7–25)
BUN/CREAT SERPL: 14 (CALC) (ref 6–22)
CALCIUM SERPL-MCNC: 8.8 MG/DL (ref 8.6–10.4)
CHLORIDE SERPL-SCNC: 103 MMOL/L (ref 98–110)
CO2 SERPL-SCNC: 31 MMOL/L (ref 20–32)
CREAT SERPL-MCNC: 1.87 MG/DL (ref 0.5–1.05)
CREAT UR-MCNC: 51 MG/DL (ref 20–275)
GFR/BSA.PRED SERPLBLD CYS-BASED-ARV: 30 ML/MIN/1.73M2
GLUCOSE SERPL-MCNC: 113 MG/DL (ref 65–99)
MAGNESIUM SERPL-MCNC: 1.9 MG/DL (ref 1.5–2.5)
POTASSIUM SERPL-SCNC: 4.6 MMOL/L (ref 3.5–5.3)
PROT UR-MCNC: 22 MG/DL (ref 5–24)
PROT/CREAT UR: 0.43 MG/MG CREAT (ref 0.02–0.18)
PROT/CREAT UR: 431 MG/G CREAT (ref 24–184)
SODIUM SERPL-SCNC: 139 MMOL/L (ref 135–146)

## 2023-11-09 NOTE — TELEPHONE ENCOUNTER
Spoke with patient's care taker, Cade Bass, about the following, she expressed understanding and thanked us for the call:    ----- Message from Maxine Wu MD sent at 11/9/2023  7:56 AM EST -----  Labs look fairly good creatinine just slightly higher  Recommend repeat basic metabolic profile at the next couple weeks with good hydration to demonstrate stability  ----- Message -----  From: Angel Ridley Lab Results In  Sent: 11/9/2023   7:45 AM EST  To: Maxine Wu MD

## 2023-11-13 NOTE — TELEPHONE ENCOUNTER
I called Gaby from [employer] last week. She was faxing me the form needed to order a home draw. Still not received. Please and have her fax to our internal fax number. Thanks!

## 2023-11-20 DIAGNOSIS — K92.1 MELENA: ICD-10-CM

## 2023-11-20 DIAGNOSIS — D62 ACUTE BLOOD LOSS ANEMIA: ICD-10-CM

## 2023-11-20 DIAGNOSIS — E11.9 TYPE 2 DIABETES MELLITUS WITHOUT COMPLICATION, WITH LONG-TERM CURRENT USE OF INSULIN (HCC): ICD-10-CM

## 2023-11-20 DIAGNOSIS — Z79.4 TYPE 2 DIABETES MELLITUS WITHOUT COMPLICATION, WITH LONG-TERM CURRENT USE OF INSULIN (HCC): ICD-10-CM

## 2023-11-20 RX ORDER — PANTOPRAZOLE SODIUM 40 MG/1
40 TABLET, DELAYED RELEASE ORAL DAILY
Qty: 90 TABLET | Refills: 1 | Status: SHIPPED | OUTPATIENT
Start: 2023-11-20 | End: 2024-05-18

## 2023-11-20 RX ORDER — OMEGA-3S/DHA/EPA/FISH OIL/D3 300MG-1000
400 CAPSULE ORAL DAILY
Qty: 100 TABLET | Refills: 1 | Status: SHIPPED | OUTPATIENT
Start: 2023-11-20

## 2023-11-22 ENCOUNTER — ANTICOAG VISIT (OUTPATIENT)
Dept: FAMILY MEDICINE CLINIC | Facility: CLINIC | Age: 63
End: 2023-11-22

## 2023-11-22 LAB
INR PPP: 1.1
INR PPP: 1.1 (ref 0.84–1.19)
PROTHROMBIN TIME: 11 SEC (ref 9–11.5)

## 2023-11-22 NOTE — PROGRESS NOTES
Patient INR 1.1. Patient to take 6 mg of coumadin today and Thursday. 3 mg of coumadin on Friday. 2 mg of coumadin on Saturday and Sunday. 3 mg of coumadin on Monday. Patient to repeat INR on 11/27/2023. If results are not back from [facility] on 11/27/2023 then patient is to take 2 mg of coumadin on Tuesday 11/28/2023. Patient caregiver made aware.

## 2023-11-28 ENCOUNTER — TELEPHONE (OUTPATIENT)
Age: 63
End: 2023-11-28

## 2023-11-28 NOTE — TELEPHONE ENCOUNTER
Patient care giver is asking if a standing order can be sent so patient can do INR. [facility] to [facility] a home draw. Also patient care giver needs a call back because she needs to know how to continue the warfarin . She is freaking out.   [address] [facility]

## 2023-11-28 NOTE — TELEPHONE ENCOUNTER
Per [employer], an order needs to be faxed [facility] they go to home for a draw. I faxed the form on 11/21 for INR to be drawn this week. For dosing--3mg today and tomorrow. They should call [facility]. I will refax form if needed    As for the home INR machine, I advised patient and caregiver at last office visit that it was their responsibility to check with insurance if home machine is covered and which company is participating. I am waiting for the info to proceed.

## 2023-11-30 ENCOUNTER — TELEPHONE (OUTPATIENT)
Age: 63
End: 2023-11-30

## 2023-11-30 ENCOUNTER — ANTICOAG VISIT (OUTPATIENT)
Dept: FAMILY MEDICINE CLINIC | Facility: CLINIC | Age: 63
End: 2023-11-30

## 2023-11-30 LAB
INR PPP: 2.2
PROTHROMBIN TIME: 21.9 SEC (ref 9–11.5)

## 2023-11-30 NOTE — TELEPHONE ENCOUNTER
Called pt and informed of INR as well as 3 mg daily dosing instructions -- also advised repeat INR Monday 12/11/23 and pt caretaker acknowledged. Will update flowsheet at this time.

## 2023-11-30 NOTE — TELEPHONE ENCOUNTER
Please advise INR is 2.2. it is back in acceptable range. I would like her to take 3 mg daily. Repeat INR on Monday 12/11/23. INR form will be faxed to [address] home draw by me.      Please update coumadin flowsheet in EPIC thanks

## 2023-11-30 NOTE — TELEPHONE ENCOUNTER
Care taker called regarding patients INR drawn yesterday. Care giver would like to know the coumadin dosage regiment. Please advise.

## 2024-01-03 ENCOUNTER — TELEPHONE (OUTPATIENT)
Age: 64
End: 2024-01-03

## 2024-01-03 DIAGNOSIS — Z79.01 CHRONIC ANTICOAGULATION: Primary | ICD-10-CM

## 2024-01-03 NOTE — TELEPHONE ENCOUNTER
Blood was already drawn so lab order placed for INR -- faxed to number provided and called and spoke w pt caretaker -- informed that order form was faxed. Pt caretaker acknowledged.

## 2024-01-03 NOTE — TELEPHONE ENCOUNTER
Caretaker Constanza calling in reference to patients INR.  She stated that [facility] is at the house now and they need a lab slip for them to draw her INR.    Tried calling office clinical line 2x and no answer.    Please fax INR lab slip to [phone]    Please call care taker Constanza and let her know that it was faxed as [facility] is at the home now.

## 2024-01-04 ENCOUNTER — ANTICOAG VISIT (OUTPATIENT)
Dept: INTERNAL MEDICINE CLINIC | Facility: CLINIC | Age: 64
End: 2024-01-04

## 2024-01-04 ENCOUNTER — TELEPHONE (OUTPATIENT)
Dept: INTERNAL MEDICINE CLINIC | Facility: CLINIC | Age: 64
End: 2024-01-04

## 2024-01-04 DIAGNOSIS — N18.31 CHRONIC KIDNEY DISEASE (CKD) STAGE G3A/A3, MODERATELY DECREASED GLOMERULAR FILTRATION RATE (GFR) BETWEEN 45-59 ML/MIN/1.73 SQUARE METER AND ALBUMINURIA CREATININE RATIO GREATER THAN 300 MG/G (HCC): Primary | ICD-10-CM

## 2024-01-04 LAB
ALBUMIN SERPL-MCNC: 3.6 G/DL (ref 3.6–5.1)
ALBUMIN/GLOB SERPL: 1 (CALC) (ref 1–2.5)
ALP SERPL-CCNC: 143 U/L (ref 37–153)
ALT SERPL-CCNC: 10 U/L (ref 6–29)
AST SERPL-CCNC: 15 U/L (ref 10–35)
BILIRUB SERPL-MCNC: 0.4 MG/DL (ref 0.2–1.2)
BUN SERPL-MCNC: 28 MG/DL (ref 7–25)
BUN/CREAT SERPL: 13 (CALC) (ref 6–22)
CALCIUM SERPL-MCNC: 9.3 MG/DL (ref 8.6–10.4)
CALCIUM SERPL-MCNC: 9.4 MG/DL (ref 8.6–10.4)
CHLORIDE SERPL-SCNC: 100 MMOL/L (ref 98–110)
CO2 SERPL-SCNC: 29 MMOL/L (ref 20–32)
CREAT SERPL-MCNC: 2.09 MG/DL (ref 0.5–1.05)
CREAT UR-MCNC: 53 MG/DL (ref 20–275)
ERYTHROCYTE [DISTWIDTH] IN BLOOD BY AUTOMATED COUNT: 13 % (ref 11–15)
FERRITIN SERPL-MCNC: 102 NG/ML (ref 16–288)
GFR/BSA.PRED SERPLBLD CYS-BASED-ARV: 26 ML/MIN/1.73M2
GLOBULIN SER CALC-MCNC: 3.7 G/DL (CALC) (ref 1.9–3.7)
GLUCOSE SERPL-MCNC: 130 MG/DL (ref 65–99)
HCT VFR BLD AUTO: 40.5 % (ref 35–45)
HGB BLD-MCNC: 12.9 G/DL (ref 11.7–15.5)
INR PPP: 4.3
INR PPP: 4.3 (ref 0.84–1.19)
IRON SATN MFR SERPL: 25 % (CALC) (ref 16–45)
IRON SERPL-MCNC: 74 MCG/DL (ref 45–160)
MAGNESIUM SERPL-MCNC: 1.9 MG/DL (ref 1.5–2.5)
MCH RBC QN AUTO: 29.7 PG (ref 27–33)
MCHC RBC AUTO-ENTMCNC: 31.9 G/DL (ref 32–36)
MCV RBC AUTO: 93.1 FL (ref 80–100)
PHOSPHATE SERPL-MCNC: 3.5 MG/DL (ref 2.5–4.5)
PLATELET # BLD AUTO: 327 THOUSAND/UL (ref 140–400)
PMV BLD REES-ECKER: 9.9 FL (ref 7.5–12.5)
POTASSIUM SERPL-SCNC: 4.2 MMOL/L (ref 3.5–5.3)
PROT SERPL-MCNC: 7.3 G/DL (ref 6.1–8.1)
PROT UR-MCNC: 17 MG/DL (ref 5–24)
PROT/CREAT UR: 0.32 MG/MG CREAT (ref 0.02–0.18)
PROT/CREAT UR: 321 MG/G CREAT (ref 24–184)
PROTHROMBIN TIME: 41 SEC (ref 9–11.5)
PTH-INTACT SERPL-MCNC: 47 PG/ML (ref 16–77)
RBC # BLD AUTO: 4.35 MILLION/UL (ref 3.8–5.1)
SODIUM SERPL-SCNC: 138 MMOL/L (ref 135–146)
TIBC SERPL-MCNC: 294 MCG/DL (CALC) (ref 250–450)
WBC # BLD AUTO: 7.8 THOUSAND/UL (ref 3.8–10.8)

## 2024-01-04 NOTE — TELEPHONE ENCOUNTER
----- Message from TULIO Finney sent at 1/4/2024 11:14 AM EST -----  INR 4.3   Any change in medications? Diet?   Her INR has been so fluctuant on 3 mg daily.     I would like her to hold coumadin today, tomorrow, sat. 3mg on sun and mon. INR on Monday.

## 2024-01-04 NOTE — PROGRESS NOTES
Patient INR 4.3. Patient to hold coumadin today, tomorrow and Saturday. 3 mg on Sunday and Monday. Repeat INR on Monday 01/08/2024.

## 2024-01-04 NOTE — TELEPHONE ENCOUNTER
Spoke with Constanza and gave results of INR 4.3. Patient to hold coumadin today, tomorrow and Saturday. Patient to take 3 mg on Sunday and Monday. Repeat INR on 01/08/2024. No noted changed to diet. Constanza said to please send INR lab order to [facility].

## 2024-01-04 NOTE — PROGRESS NOTES
Pt advised that labs look good.  Per Dr. Winchester, BMP prior to 2/29/24 follow up visit, non-fasting.  Order faxed to [employer] home draw.

## 2024-01-17 ENCOUNTER — TELEPHONE (OUTPATIENT)
Age: 64
End: 2024-01-17

## 2024-01-17 NOTE — TELEPHONE ENCOUNTER
Gaby from [employer], who does the home draws, says she is not always getting the faxes that she needs. She is requesting that the INR orders be faxed to [phone] to be certain that she gets them. Thank you.

## 2024-01-18 ENCOUNTER — TELEPHONE (OUTPATIENT)
Dept: FAMILY MEDICINE CLINIC | Facility: CLINIC | Age: 64
End: 2024-01-18

## 2024-01-18 LAB
INR PPP: 2.3
PROTHROMBIN TIME: 22.8 SEC (ref 9–11.5)

## 2024-01-18 NOTE — TELEPHONE ENCOUNTER
----- Message from TULIO Finney sent at 1/18/2024 12:56 PM EST -----  INR is acceptable 2.3.  Please find out what dose of Coumadin the patient is currently taking so I can advise

## 2024-01-19 ENCOUNTER — ANTICOAG VISIT (OUTPATIENT)
Dept: FAMILY MEDICINE CLINIC | Facility: CLINIC | Age: 64
End: 2024-01-19

## 2024-01-19 NOTE — TELEPHONE ENCOUNTER
Called and spoke marlee Apodaca -- informed of PCP message -- she added that [employer] fax number is [phone]. Will have INR re-ck in 2 wks time.

## 2024-01-19 NOTE — PROGRESS NOTES
Called and spoke marlee Apodaca -- informed of PCP message -- she added that [employer] fax number is [phone]. Will have INR re-ck in 2 wks time. INR 2.3 and no dose recc.

## 2024-02-06 ENCOUNTER — ANTICOAG VISIT (OUTPATIENT)
Dept: FAMILY MEDICINE CLINIC | Facility: CLINIC | Age: 64
End: 2024-02-06

## 2024-02-06 ENCOUNTER — TELEPHONE (OUTPATIENT)
Dept: FAMILY MEDICINE CLINIC | Facility: CLINIC | Age: 64
End: 2024-02-06

## 2024-02-06 LAB
INR PPP: 2.5
INR PPP: 2.5 (ref 0.84–1.19)
PROTHROMBIN TIME: 24.8 SEC (ref 9–11.5)

## 2024-02-06 NOTE — TELEPHONE ENCOUNTER
----- Message from TULIO Finney sent at 2/6/2024  1:50 PM EST -----  Please advise INR is acceptable.  Continue current dose.  Recheck in 2 weeks

## 2024-02-12 DIAGNOSIS — D50.8 IRON DEFICIENCY ANEMIA SECONDARY TO INADEQUATE DIETARY IRON INTAKE: ICD-10-CM

## 2024-02-12 RX ORDER — FERROUS SULFATE 325(65) MG
325 TABLET ORAL
Qty: 45 TABLET | Refills: 1 | Status: SHIPPED | OUTPATIENT
Start: 2024-02-12

## 2024-02-20 LAB
BUN SERPL-MCNC: 29 MG/DL (ref 7–25)
BUN/CREAT SERPL: 16 (CALC) (ref 6–22)
CALCIUM SERPL-MCNC: 8.7 MG/DL (ref 8.6–10.4)
CHLORIDE SERPL-SCNC: 102 MMOL/L (ref 98–110)
CO2 SERPL-SCNC: 33 MMOL/L (ref 20–32)
CREAT SERPL-MCNC: 1.83 MG/DL (ref 0.5–1.05)
GFR/BSA.PRED SERPLBLD CYS-BASED-ARV: 30 ML/MIN/1.73M2
GLUCOSE SERPL-MCNC: 86 MG/DL (ref 65–99)
INR PPP: 4.7
POTASSIUM SERPL-SCNC: 4.9 MMOL/L (ref 3.5–5.3)
PROTHROMBIN TIME: 45 SEC (ref 9–11.5)
SODIUM SERPL-SCNC: 140 MMOL/L (ref 135–146)

## 2024-02-20 NOTE — PROGRESS NOTES
RENAL FOLLOW UP NOTE:.td    ASSESSMENT AND PLAN:  1.  CKD stage 3.:  Etiology:  Right radical nephrectomy July 15, 2016 for renal cell CA/hypertensive nephrosclerosis/arteriolar nephrosclerosis/diabetic nephropathy/?  Morbid obesity with?  FSGS  Baseline creatinine:  new baseline appears to be from 1.8-as high as 2.4  Current creatinine:  1.83 at baseline  Urine protein creatinine ratio: 0.32 g at goal  UA microscopic no proteinuria, trace intact heme from 05/13/2020  Recommendations:  Treat hypertension-please see below  Treat dyslipidemia-please see below  Maintain proteinuria less than 1 g or as low as possible  Avoid nephrotoxic agents such as NSAIDs, patient counseled as such  Kidney smart referral this time     2.  Volume:  Current volume:  Euvolemic  Treatment:  Euvolemic use furosemide 40 mg daily and an extra dose only as needed which has not been the case in the evening     3.  Hypertension:   Home blood pressures:    P.m.: 119/74  A.m.:   none, But apparently similar to the mornings  Heart rate:     Goal blood pressure:  Less than 130/80  Recommendations:    push nonmedical regimen including weight loss, and any form of exercise patient can not tolerate; avoidance of salt; patient counseled as such  Medication changes today:  Seems doing well.  Only medication is currently cardiac meds including furosemide and metoprolol with the atrial fibrillation.  She seems to be tolerating this no changes and continue off amlodipine     4.  Electrolytes:  Chronic metabolic alkalosis most likely from primary CO2 retention, stable at 33; mild hyperkalemia would recommend low potassium diet      5.  Mineral bone disorder:  Secondary to CKD:  Calcium/magnesium/phosphorus:  All acceptable  PTH intact: 47 at goal  Vitamin-D:  47 at goal     6.  Dyslipidemia:  Goal LDL:  Less than 100  Current lipid profile: LDL 41/HDL 32/triglycerides 97  Recommendations: No changes as patient is at goal     7.  Anemia:    -Current value  12.9 normal!  -iron studies:  Adequate as of 1/3/2024  -B12 and folate deficiency being treated  -multiple myeloma was ruled out in January  -GI evaluation in January of 2018:   colonoscopy demonstrated internal/external hemorrhoids; also colonic polyps which were removed.  There was a suboptimal colonic prep.  Felt she may require repeat colonoscopy in 3-6 months. EGD demonstrated small hiatal hernia.   GI evaluated the patient December 2022 because of anemia EGD demonstrated gastric duodenal AVMs requiring APC, follow-up secondary endoscopy because of dropping hemoglobin; colonoscopy demonstrated no active bleeding  Capsule endoscopy was recommended but not scheduled at this time           8.  Other problems:  Right radical nephrectomy for renal cell CA July 15, 2016  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 2, 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  Morbid obesity  Atrial fibrillation  Cellulitis of the left leg back in January treated with intravenous antibiotics  COPD on iron  EGD involving multiple joints  Hypothyroidism on supplement  History of gait dysfunction using motorized scooter to ambulate  Diabetes mellitus/diabetic neuropathy  Admission 8/2021 for abdominal pain-workup is CT/EGD/colonoscopy/push enteroscopy all negative except for gastritis-esophagitis; the patient's symptoms resolved spontaneously.  Patient with a ventral hernia which she deferred in terms of surgical revision, will follow-up with surgery.  Admission to [facility] 12/11/2022 with abdominal wall cellulitis/anemia status posttransfusion  Colonoscopy 11/30/2022 demonstrated diverticula in the sigmoid colon otherwise normal colonoscopy  EGD with push enteroscopy was prematurely aborted secondary respiratory instability  Completed course of antibiotics including Keflex  Peak creatinine 2.19 on admission came down to 1.85  Admission December 3, 2022 with acute hypoxic hypercapnic respiratory failure: Felt to have  multifocal pneumonia started on empiric antibiotics treated by pulmonary.  Also GI evaluation for anemia please see above            GI health maintenance: 12/2/2022: Normal colonoscopy recommended repeat in 5 years which would be December 2027.    PATIENT INSTRUCTIONS:    Patient Instructions   Visit summary:  - Overall kidney function is stable  - Blood pressure seems also acceptable it slightly low but in general doing well.  Most of your medications are for the heart in terms of the metoprolol and Lasix or furosemide.  Continue off amlodipine.    We are going to refer you to our kidney smart program which is an educational program that gives you helpful information to keep you on your kidneys healthy        1. Medication changes today:  None today    2.  General instructions:  Avoid any salt  Please follow a low potassium diet please see below for foods that have a lot of potassium in the        3.  Please take 1 week a blood pressure readings in 6 months prior to next visit    AS FOLLOWS  MORNING AND EVENING, SITTING AND STANDING as follows:  TAKE THE MORNING READINGS BEFORE ANY MEDICATIONS AND WHEN YOU ARE RELAXED FOR SEVERAL MINUTES  TAKE THE EVENING READINGS:  BETWEEN 7-10 P.M.; PRIOR TO ANY MEDICATIONS; AT LEAST IN OUR  FROM DINNER; AND CERTAINLY AFTER RELAXING FOR A FEW MINUTES  PLEASE INCLUDE HEART RATE WITH YOUR BLOOD PRESSURE READINGS  When taking standing readings, keep your arm supported at heart level and not dangling  Make sure you are sitting with your back supported and feet on the ground and do not cross your legs or feet  Make sure you have not taken any coffee or caffeine products or exercised or smoke cigarettes at least 30 minutes before taking your blood pressure  Then please mail these readings into the office      4.  In 3 months:  Please go for nonfasting lab work but in the morning        5.  Follow-up in 6 months  Please bring in 1 week a blood pressure readings morning  evening, sitting and standing is outlined above  PLEASE BRING AN YOUR BLOOD PRESSURE MACHINE TO CORRELATE WITH THE OFFICE MACHINE AT THIS NEXT SCHEDULED VISIT  Please go for fasting lab work 1-2 weeks prior to your appointment      6.  General non medical recommendations:  AVOID SALT BUT NOT ADDING AN READING LABELS TO MAKE SURE THERE IS LOW-SALT IN THE FOOD THAT YOU ARE EATING  Goal is less than 2 g of sodium intake or less than 5 g of sodium chloride intake per day    Avoid nonsteroidal anti-inflammatory drugs such as Naprosyn, ibuprofen, Aleve, Advil, Celebrex, Meloxicam (Mobic) etc.  You can use Tylenol as needed if you do not have any liver condition to be concerned about    Avoid medications such as Sudafed or decongestants and antihistamines that contained the D component which is the decongestant.  You can take antihistamines without the decongestant or D component.    Try to avoid medications such as pantoprazole or  Protonix/Nexium or Esomeprazole)/Prilosec or omeprazole/Prevacid or lansoprazole/AcipHex or Rabeprazole.  If you are able to, use Pepcid as this is safer for your kidneys.    Try to exercise at least 30 minutes 3 days a week to begin with with an ultimate goal of 5 days a week for at least 30 minutes    Please do not drink more than 2 glasses of alcohol/wine on a daily basis as this may contribute to your high blood pressure.            Subjective:   There has been no hospitalizations or acute illnesses since last visit.  The patient overall is feeling well.  No fevers, chills, or cough or colds.  Good appetite and good energy  No hematuria, dysuria, voiding symptoms or foamy urine  No gastrointestinal symptoms  No chest pain, chronic dyspnea on exertion on home oxygen, no significant leg swelling  No headaches, dizziness or lightheadedness  Blood pressure medications:  Metoprolol tartrate 50 mg twice a day  Furosemide 40 mg daily in the morning occasional as needed dose but has not typically  been taking  Not taking amlodipine    Renal pertinent medications:  Pantoprazole 40 mg daily/famotidine 20 mg at bedtime  Iron every other day  Vitamin D 400 units daily  Atorvastatin 10 mg daily    ROS:  See HPI, otherwise review of systems as completely reviewed with the patient are negative    Past Medical History:   Diagnosis Date    Anemia     Arthritis     Cancer of kidney (HCC)     Chronic kidney disease     Diabetes mellitus (HCC)     Disease of thyroid gland     HLD (hyperlipidemia)     Hypertension     Ovarian cancer (HCC)     Pneumonia      Past Surgical History:   Procedure Laterality Date    CHOLECYSTECTOMY      CT GUIDED PERC DRAINAGE CATHETER PLACEMENT  5/16/2016    GALLBLADDER SURGERY  05/01/2004    HYSTERECTOMY  06/01/2018    NEPHRECTOMY Right 08/02/2018     Family History   Problem Relation Age of Onset    No Known Problems Mother     No Known Problems Father       reports that she quit smoking about 13 years ago. Her smoking use included cigarettes. She started smoking about 43 years ago. She has a 90 pack-year smoking history. She quit smokeless tobacco use about 12 years ago. She reports current drug use. Drug: Marijuana. She reports that she does not drink alcohol.    I COMPLETELY REVIEWED THE PAST MEDICAL HISTORY/PAST SURGICAL HISTORY/SOCIAL HISTORY/FAMILY HISTORY/AND MEDICATIONS  AND UPDATED ALL    Objective:     Vitals: There were no vitals filed for this visit.    Weight (last 2 days)       Date/Time Weight    02/29/24 1246 --     Weight: patient in w/c at 02/29/24 1246          Wt Readings from Last 3 Encounters:   08/30/23 132 kg (290 lb)   03/14/23 132 kg (290 lb 6.4 oz)   02/16/23 (!) 153 kg (338 lb)       Body mass index is 49.78 kg/m².    Physical Exam: General:  No acute distress  Skin:  No acute rash  Eyes:  No scleral icterus, noninjected, no discharge from eyes  ENT:  Moist mucous membranes  Neck:  Supple, no jugular venous distention, trachea is midline, no lymphadenopathy and no  thyromegaly  Back   No CVAT  Chest:  Clear to auscultation and percussion, good respiratory effort  CVS:  Regular rate and rhythm without a rub, or gallops or murmurs  Abdomen:  Soft and nontender with normal bowel sounds  Extremities:  No cyanosis and no edema, no arthritic changes, normal range of motion  Neuro:  Grossly intact  Psych:  Alert, oriented x3 and appropriate      Medications:    Current Outpatient Medications:     albuterol (2.5 mg/3 mL) 0.083 % nebulizer solution, INHALE CONTENTS OF 1 VIAL ( 3 MILLILITERS ) IN NEBULIZER BY MOUTH AND INTO THE LUNGS EVERY 6 HOURS IF NEEDED FOR WHEEZING OR SHORTNESS OF BREATH, Disp: 75 mL, Rfl: 5    albuterol (PROVENTIL HFA,VENTOLIN HFA) 90 mcg/act inhaler, Inhale 1 puff every 4 (four) hours as needed for wheezing, Disp: 18 g, Rfl: 1    atorvastatin (LIPITOR) 10 mg tablet, Take 1 tablet (10 mg total) by mouth daily, Disp: 90 tablet, Rfl: 3    Blood Glucose Monitoring Suppl (Jambo Pro Glucose Meter) SAVANNAH, Use 2 (two) times a day [employer] Systems[employer] glucometer, test twice daily, Disp: 1 each, Rfl: 0    cholecalciferol (VITAMIN D3) 400 units tablet, Take 1 tablet (400 Units total) by mouth daily, Disp: 100 tablet, Rfl: 1    famotidine (PEPCID) 20 mg tablet, Take 1 tablet (20 mg total) by mouth daily, Disp: 90 tablet, Rfl: 3    ferrous sulfate 325 (65 Fe) mg tablet, Take 1 tablet (325 mg total) by mouth daily with breakfast Every other day, Disp: 45 tablet, Rfl: 1    furosemide (LASIX) 40 mg tablet, Take 1 tablet (40 mg total) by mouth daily (Patient taking differently: Take 40 mg by mouth daily And and as needed dose), Disp: 90 tablet, Rfl: 3    hydrOXYzine HCL (ATARAX) 50 mg tablet, Take 1 tablet (50 mg total) by mouth daily at bedtime, Disp: 90 tablet, Rfl: 3    levothyroxine 100 mcg tablet, Take 1 tablet (100 mcg total) by mouth daily, Disp: 90 tablet, Rfl: 3    LORazepam (ATIVAN) 0.5 mg tablet, Take 1 tablet (0.5 mg total) by mouth 2 (two) times a day as needed for anxiety,  "Disp: 60 tablet, Rfl: 2    metoprolol tartrate (LOPRESSOR) 50 mg tablet, Take 1 tablet (50 mg total) by mouth 2 (two) times a day, Disp: 180 tablet, Rfl: 3    nystatin (MYCOSTATIN) cream, Apply topically 2 (two) times a day, Disp: 30 g, Rfl: 3    pantoprazole (PROTONIX) 40 mg tablet, Take 1 tablet (40 mg total) by mouth daily, Disp: 90 tablet, Rfl: 1    sertraline (ZOLOFT) 50 mg tablet, Take 1 tablet (50 mg total) by mouth daily, Disp: 90 tablet, Rfl: 3    triamcinolone (KENALOG) 0.1 % ointment, Apply topically 2 (two) times a day, Disp: 160 g, Rfl: 4    warfarin (Coumadin) 1 mg tablet, Take 1 tablet (1 mg total) by mouth daily (Patient taking differently: Take 1 mg by mouth daily Monday,Wednesday, Friday 3 mg Sunday, Saturday, Tuesday, Thursday 1 mg), Disp: 90 tablet, Rfl: 2    warfarin (COUMADIN) 3 mg tablet, Take 1 tablet (3 mg total) by mouth daily, Disp: 90 tablet, Rfl: 3    amLODIPine (NORVASC) 5 mg tablet, take 1 tablet by mouth once daily (Patient not taking: Reported on 2/29/2024), Disp: 90 tablet, Rfl: 2    collagenase (SANTYL) ointment, Apply topically daily (Patient not taking: Reported on 5/26/2023), Disp: 15 g, Rfl: 1    Embrace Lancets Ultra Thin 30G MISC, Use 2 (two) times a day (Patient not taking: Reported on 7/19/2023), Disp: 100 each, Rfl: 5    glucose blood (Embrace Blood Glucose Test) test strip, Use as instructed to check sugar bid (Patient not taking: Reported on 5/26/2023), Disp: 100 strip, Rfl: 4    ipratropium-albuterol (DUO-NEB) 0.5-2.5 mg/3 mL nebulizer solution, Take 3 mL by nebulization every 8 (eight) hours as needed for wheezing or shortness of breath, Disp: 60 mL, Rfl: 3    Lab, Imaging and other studies: I have personally reviewed pertinent labs.  Laboratory Results:  Results for orders placed or performed in visit on 02/21/24   Protime-INR   Result Value Ref Range    INR 4.70 (A) 0.84 - 1.19             Invalid input(s): \"ALBUMIN\"        Radiology review:   chest " "X-ray    Ultrasound      Portions of the record may have been created with voice recognition software.  Occasional wrong word or \"sound a like\" substitutions may have occurred due to the inherent limitations of voice recognition software.  Read the chart carefully and recognize, using context, where substitutions have occurred.                    "

## 2024-02-21 ENCOUNTER — ANTICOAG VISIT (OUTPATIENT)
Dept: FAMILY MEDICINE CLINIC | Facility: CLINIC | Age: 64
End: 2024-02-21

## 2024-02-21 ENCOUNTER — TELEPHONE (OUTPATIENT)
Dept: FAMILY MEDICINE CLINIC | Facility: CLINIC | Age: 64
End: 2024-02-21

## 2024-02-21 LAB — INR PPP: 4.7 (ref 0.84–1.19)

## 2024-02-21 NOTE — PROGRESS NOTES
Patient INR 4.7. Patient to hold coumadin for 3 days and then resume 3 mg dose thereafter. Repeat INR on 03/04/2024.

## 2024-02-21 NOTE — TELEPHONE ENCOUNTER
Spoke with Constanza and made aware of INR 4.7. Patient to hold coumadin Wed, Thurs, Fri. Then 3 mg daily starting Sat. Repeat INR Monday 03/04/2024. Flowsheet updated.

## 2024-02-21 NOTE — TELEPHONE ENCOUNTER
----- Message from TULIO Finney sent at 2/21/2024  9:22 AM EST -----  Hold 3 days, resume 3mg daily    INR Monday 3/4/24    Please find out if she took extra doses by accident as her INR was high again-4.7  ----- Message -----  From: Jewell Jiménez LPN  Sent: 2/21/2024   9:10 AM EST  To: TULIO Finney    Will call today. Any changes you would like to make to dosing?  ----- Message -----  From: TULIO Finney  Sent: 2/20/2024   5:12 PM EST  To: [facility]    INR high again--4.7 Any change in medications? Diet?   Her INR has been so fluctuant on 3 mg daily.      I would like her to hold coumadin today, tomorrow, thurs. 3mg daily.   INR 2/29/24

## 2024-02-29 ENCOUNTER — TELEPHONE (OUTPATIENT)
Dept: NEPHROLOGY | Facility: CLINIC | Age: 64
End: 2024-02-29

## 2024-02-29 ENCOUNTER — OFFICE VISIT (OUTPATIENT)
Dept: NEPHROLOGY | Facility: CLINIC | Age: 64
End: 2024-02-29
Payer: MEDICARE

## 2024-02-29 VITALS — BODY MASS INDEX: 49.78 KG/M2 | HEIGHT: 64 IN

## 2024-02-29 DIAGNOSIS — N18.32 STAGE 3B CHRONIC KIDNEY DISEASE (HCC): ICD-10-CM

## 2024-02-29 DIAGNOSIS — E11.65 TYPE 2 DIABETES MELLITUS WITH HYPERGLYCEMIA, UNSPECIFIED WHETHER LONG TERM INSULIN USE (HCC): ICD-10-CM

## 2024-02-29 DIAGNOSIS — I12.9 HYPERTENSIVE CHRONIC KIDNEY DISEASE WITH STAGE 1 THROUGH STAGE 4 CHRONIC KIDNEY DISEASE, OR UNSPECIFIED CHRONIC KIDNEY DISEASE: Primary | ICD-10-CM

## 2024-02-29 DIAGNOSIS — E78.5 DYSLIPIDEMIA: ICD-10-CM

## 2024-02-29 DIAGNOSIS — N25.81 SECONDARY HYPERPARATHYROIDISM OF RENAL ORIGIN (HCC): ICD-10-CM

## 2024-02-29 DIAGNOSIS — N18.32 ANEMIA IN STAGE 3B CHRONIC KIDNEY DISEASE: ICD-10-CM

## 2024-02-29 DIAGNOSIS — E61.1 IRON DEFICIENCY: ICD-10-CM

## 2024-02-29 DIAGNOSIS — D63.1 ANEMIA IN STAGE 3B CHRONIC KIDNEY DISEASE: ICD-10-CM

## 2024-02-29 DIAGNOSIS — E66.01 MORBID OBESITY WITH BMI OF 50.0-59.9, ADULT (HCC): ICD-10-CM

## 2024-02-29 DIAGNOSIS — R80.1 PERSISTENT PROTEINURIA: ICD-10-CM

## 2024-02-29 PROCEDURE — 99214 OFFICE O/P EST MOD 30 MIN: CPT | Performed by: INTERNAL MEDICINE

## 2024-02-29 NOTE — LETTER
February 29, 2024     TULIO Finney  [address]  [address]  [facility] [address]    Patient: Adelina Soto   YOB: 1960   Date of Visit: 2/29/2024       Dear Dr. Sam:    Thank you for referring Adelina Soto to me for evaluation. Below are my notes for this consultation.    If you have questions, please do not hesitate to call me. I look forward to following your patient along with you.         Sincerely,        Delmar Winchester MD        CC: No Recipients    Delmar Winchester MD  2/29/2024  1:15 PM  Sign when Signing Visit  RENAL FOLLOW UP NOTE:.td    ASSESSMENT AND PLAN:  1.  CKD stage 3.:  Etiology:  Right radical nephrectomy July 15, 2016 for renal cell CA/hypertensive nephrosclerosis/arteriolar nephrosclerosis/diabetic nephropathy/?  Morbid obesity with?  FSGS  Baseline creatinine:  new baseline appears to be from 1.8-as high as 2.4  Current creatinine:  1.83 at baseline  Urine protein creatinine ratio: 0.32 g at goal  UA microscopic no proteinuria, trace intact heme from 05/13/2020  Recommendations:  Treat hypertension-please see below  Treat dyslipidemia-please see below  Maintain proteinuria less than 1 g or as low as possible  Avoid nephrotoxic agents such as NSAIDs, patient counseled as such  Kidney smart referral this time     2.  Volume:  Current volume:  Euvolemic  Treatment:  Euvolemic use furosemide 40 mg daily and an extra dose only as needed which has not been the case in the evening     3.  Hypertension:   Home blood pressures:    P.m.: 119/74  A.m.:   none, But apparently similar to the mornings  Heart rate:     Goal blood pressure:  Less than 130/80  Recommendations:    push nonmedical regimen including weight loss, and any form of exercise patient can not tolerate; avoidance of salt; patient counseled as such  Medication changes today:  Seems doing well.  Only medication is currently cardiac meds including furosemide and metoprolol with the atrial fibrillation.   She seems to be tolerating this no changes and continue off amlodipine     4.  Electrolytes:  Chronic metabolic alkalosis most likely from primary CO2 retention, stable at 33; mild hyperkalemia would recommend low potassium diet      5.  Mineral bone disorder:  Secondary to CKD:  Calcium/magnesium/phosphorus:  All acceptable  PTH intact: 47 at goal  Vitamin-D:  47 at goal     6.  Dyslipidemia:  Goal LDL:  Less than 100  Current lipid profile: LDL 41/HDL 32/triglycerides 97  Recommendations: No changes as patient is at goal     7.  Anemia:    -Current value 12.9 normal!  -iron studies:  Adequate as of 1/3/2024  -B12 and folate deficiency being treated  -multiple myeloma was ruled out in January  -GI evaluation in January of 2018:   colonoscopy demonstrated internal/external hemorrhoids; also colonic polyps which were removed.  There was a suboptimal colonic prep.  Felt she may require repeat colonoscopy in 3-6 months. EGD demonstrated small hiatal hernia.   GI evaluated the patient December 2022 because of anemia EGD demonstrated gastric duodenal AVMs requiring APC, follow-up secondary endoscopy because of dropping hemoglobin; colonoscopy demonstrated no active bleeding  Capsule endoscopy was recommended but not scheduled at this time           8.  Other problems:  Right radical nephrectomy for renal cell CA July 15, 2016  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 2, 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  Morbid obesity  Atrial fibrillation  Cellulitis of the left leg back in January treated with intravenous antibiotics  COPD on iron  EGD involving multiple joints  Hypothyroidism on supplement  History of gait dysfunction using motorized scooter to ambulate  Diabetes mellitus/diabetic neuropathy  Admission 8/2021 for abdominal pain-workup is CT/EGD/colonoscopy/push enteroscopy all negative except for gastritis-esophagitis; the patient's symptoms resolved spontaneously.  Patient with a ventral hernia which she  deferred in terms of surgical revision, will follow-up with surgery.  Admission to [facility] 12/11/2022 with abdominal wall cellulitis/anemia status posttransfusion  Colonoscopy 11/30/2022 demonstrated diverticula in the sigmoid colon otherwise normal colonoscopy  EGD with push enteroscopy was prematurely aborted secondary respiratory instability  Completed course of antibiotics including Keflex  Peak creatinine 2.19 on admission came down to 1.85  Admission December 3, 2022 with acute hypoxic hypercapnic respiratory failure: Felt to have multifocal pneumonia started on empiric antibiotics treated by pulmonary.  Also GI evaluation for anemia please see above            GI health maintenance: 12/2/2022: Normal colonoscopy recommended repeat in 5 years which would be December 2027.    PATIENT INSTRUCTIONS:    Patient Instructions   Visit summary:  - Overall kidney function is stable  - Blood pressure seems also acceptable it slightly low but in general doing well.  Most of your medications are for the heart in terms of the metoprolol and Lasix or furosemide.  Continue off amlodipine.    We are going to refer you to our kidney smart program which is an educational program that gives you helpful information to keep you on your kidneys healthy        1. Medication changes today:  None today    2.  General instructions:  Avoid any salt  Please follow a low potassium diet please see below for foods that have a lot of potassium in the        3.  Please take 1 week a blood pressure readings in 6 months prior to next visit    AS FOLLOWS  MORNING AND EVENING, SITTING AND STANDING as follows:  TAKE THE MORNING READINGS BEFORE ANY MEDICATIONS AND WHEN YOU ARE RELAXED FOR SEVERAL MINUTES  TAKE THE EVENING READINGS:  BETWEEN 7-10 P.M.; PRIOR TO ANY MEDICATIONS; AT LEAST IN OUR  FROM DINNER; AND CERTAINLY AFTER RELAXING FOR A FEW MINUTES  PLEASE INCLUDE HEART RATE WITH YOUR BLOOD PRESSURE READINGS  When taking  standing readings, keep your arm supported at heart level and not dangling  Make sure you are sitting with your back supported and feet on the ground and do not cross your legs or feet  Make sure you have not taken any coffee or caffeine products or exercised or smoke cigarettes at least 30 minutes before taking your blood pressure  Then please mail these readings into the office      4.  In 3 months:  Please go for nonfasting lab work but in the morning        5.  Follow-up in 6 months  Please bring in 1 week a blood pressure readings morning evening, sitting and standing is outlined above  PLEASE BRING AN YOUR BLOOD PRESSURE MACHINE TO CORRELATE WITH THE OFFICE MACHINE AT THIS NEXT SCHEDULED VISIT  Please go for fasting lab work 1-2 weeks prior to your appointment      6.  General non medical recommendations:  AVOID SALT BUT NOT ADDING AN READING LABELS TO MAKE SURE THERE IS LOW-SALT IN THE FOOD THAT YOU ARE EATING  Goal is less than 2 g of sodium intake or less than 5 g of sodium chloride intake per day    Avoid nonsteroidal anti-inflammatory drugs such as Naprosyn, ibuprofen, Aleve, Advil, Celebrex, Meloxicam (Mobic) etc.  You can use Tylenol as needed if you do not have any liver condition to be concerned about    Avoid medications such as Sudafed or decongestants and antihistamines that contained the D component which is the decongestant.  You can take antihistamines without the decongestant or D component.    Try to avoid medications such as pantoprazole or  Protonix/Nexium or Esomeprazole)/Prilosec or omeprazole/Prevacid or lansoprazole/AcipHex or Rabeprazole.  If you are able to, use Pepcid as this is safer for your kidneys.    Try to exercise at least 30 minutes 3 days a week to begin with with an ultimate goal of 5 days a week for at least 30 minutes    Please do not drink more than 2 glasses of alcohol/wine on a daily basis as this may contribute to your high blood pressure.            Subjective:    There has been no hospitalizations or acute illnesses since last visit.  The patient overall is feeling well.  No fevers, chills, or cough or colds.  Good appetite and good energy  No hematuria, dysuria, voiding symptoms or foamy urine  No gastrointestinal symptoms  No chest pain, chronic dyspnea on exertion on home oxygen, no significant leg swelling  No headaches, dizziness or lightheadedness  Blood pressure medications:  Metoprolol tartrate 50 mg twice a day  Furosemide 40 mg daily in the morning occasional as needed dose but has not typically been taking  Not taking amlodipine    Renal pertinent medications:  Pantoprazole 40 mg daily/famotidine 20 mg at bedtime  Iron every other day  Vitamin D 400 units daily  Atorvastatin 10 mg daily    ROS:  See HPI, otherwise review of systems as completely reviewed with the patient are negative    Past Medical History:   Diagnosis Date   • Anemia    • Arthritis    • Cancer of kidney (HCC)    • Chronic kidney disease    • Diabetes mellitus (HCC)    • Disease of thyroid gland    • HLD (hyperlipidemia)    • Hypertension    • Ovarian cancer (HCC)    • Pneumonia      Past Surgical History:   Procedure Laterality Date   • CHOLECYSTECTOMY     • CT GUIDED PERC DRAINAGE CATHETER PLACEMENT  5/16/2016   • GALLBLADDER SURGERY  05/01/2004   • HYSTERECTOMY  06/01/2018   • NEPHRECTOMY Right 08/02/2018     Family History   Problem Relation Age of Onset   • No Known Problems Mother    • No Known Problems Father       reports that she quit smoking about 13 years ago. Her smoking use included cigarettes. She started smoking about 43 years ago. She has a 90 pack-year smoking history. She quit smokeless tobacco use about 12 years ago. She reports current drug use. Drug: Marijuana. She reports that she does not drink alcohol.    I COMPLETELY REVIEWED THE PAST MEDICAL HISTORY/PAST SURGICAL HISTORY/SOCIAL HISTORY/FAMILY HISTORY/AND MEDICATIONS  AND UPDATED ALL    Objective:     Vitals: There were  no vitals filed for this visit.    Weight (last 2 days)       Date/Time Weight    02/29/24 1246 --     Weight: patient in w/c at 02/29/24 1246          Wt Readings from Last 3 Encounters:   08/30/23 132 kg (290 lb)   03/14/23 132 kg (290 lb 6.4 oz)   02/16/23 (!) 153 kg (338 lb)       Body mass index is 49.78 kg/m².    Physical Exam: General:  No acute distress  Skin:  No acute rash  Eyes:  No scleral icterus, noninjected, no discharge from eyes  ENT:  Moist mucous membranes  Neck:  Supple, no jugular venous distention, trachea is midline, no lymphadenopathy and no thyromegaly  Back   No CVAT  Chest:  Clear to auscultation and percussion, good respiratory effort  CVS:  Regular rate and rhythm without a rub, or gallops or murmurs  Abdomen:  Soft and nontender with normal bowel sounds  Extremities:  No cyanosis and no edema, no arthritic changes, normal range of motion  Neuro:  Grossly intact  Psych:  Alert, oriented x3 and appropriate      Medications:    Current Outpatient Medications:   •  albuterol (2.5 mg/3 mL) 0.083 % nebulizer solution, INHALE CONTENTS OF 1 VIAL ( 3 MILLILITERS ) IN NEBULIZER BY MOUTH AND INTO THE LUNGS EVERY 6 HOURS IF NEEDED FOR WHEEZING OR SHORTNESS OF BREATH, Disp: 75 mL, Rfl: 5  •  albuterol (PROVENTIL HFA,VENTOLIN HFA) 90 mcg/act inhaler, Inhale 1 puff every 4 (four) hours as needed for wheezing, Disp: 18 g, Rfl: 1  •  atorvastatin (LIPITOR) 10 mg tablet, Take 1 tablet (10 mg total) by mouth daily, Disp: 90 tablet, Rfl: 3  •  Blood Glucose Monitoring Suppl (Embrace Pro Glucose Meter) SAVANNAH, Use 2 (two) times a day Embrace glucometer, test twice daily, Disp: 1 each, Rfl: 0  •  cholecalciferol (VITAMIN D3) 400 units tablet, Take 1 tablet (400 Units total) by mouth daily, Disp: 100 tablet, Rfl: 1  •  famotidine (PEPCID) 20 mg tablet, Take 1 tablet (20 mg total) by mouth daily, Disp: 90 tablet, Rfl: 3  •  ferrous sulfate 325 (65 Fe) mg tablet, Take 1 tablet (325 mg total) by mouth daily with  breakfast Every other day, Disp: 45 tablet, Rfl: 1  •  furosemide (LASIX) 40 mg tablet, Take 1 tablet (40 mg total) by mouth daily (Patient taking differently: Take 40 mg by mouth daily And and as needed dose), Disp: 90 tablet, Rfl: 3  •  hydrOXYzine HCL (ATARAX) 50 mg tablet, Take 1 tablet (50 mg total) by mouth daily at bedtime, Disp: 90 tablet, Rfl: 3  •  levothyroxine 100 mcg tablet, Take 1 tablet (100 mcg total) by mouth daily, Disp: 90 tablet, Rfl: 3  •  LORazepam (ATIVAN) 0.5 mg tablet, Take 1 tablet (0.5 mg total) by mouth 2 (two) times a day as needed for anxiety, Disp: 60 tablet, Rfl: 2  •  metoprolol tartrate (LOPRESSOR) 50 mg tablet, Take 1 tablet (50 mg total) by mouth 2 (two) times a day, Disp: 180 tablet, Rfl: 3  •  nystatin (MYCOSTATIN) cream, Apply topically 2 (two) times a day, Disp: 30 g, Rfl: 3  •  pantoprazole (PROTONIX) 40 mg tablet, Take 1 tablet (40 mg total) by mouth daily, Disp: 90 tablet, Rfl: 1  •  sertraline (ZOLOFT) 50 mg tablet, Take 1 tablet (50 mg total) by mouth daily, Disp: 90 tablet, Rfl: 3  •  triamcinolone (KENALOG) 0.1 % ointment, Apply topically 2 (two) times a day, Disp: 160 g, Rfl: 4  •  warfarin (Coumadin) 1 mg tablet, Take 1 tablet (1 mg total) by mouth daily (Patient taking differently: Take 1 mg by mouth daily Monday,Wednesday, Friday 3 mg Sunday, Saturday, Tuesday, Thursday 1 mg), Disp: 90 tablet, Rfl: 2  •  warfarin (COUMADIN) 3 mg tablet, Take 1 tablet (3 mg total) by mouth daily, Disp: 90 tablet, Rfl: 3  •  amLODIPine (NORVASC) 5 mg tablet, take 1 tablet by mouth once daily (Patient not taking: Reported on 2/29/2024), Disp: 90 tablet, Rfl: 2  •  collagenase (SANTYL) ointment, Apply topically daily (Patient not taking: Reported on 5/26/2023), Disp: 15 g, Rfl: 1  •  Embrace Lancets Ultra Thin 30G MISC, Use 2 (two) times a day (Patient not taking: Reported on 7/19/2023), Disp: 100 each, Rfl: 5  •  glucose blood (Embrace Blood Glucose Test) test strip, Use as instructed  "to check sugar bid (Patient not taking: Reported on 5/26/2023), Disp: 100 strip, Rfl: 4  •  ipratropium-albuterol (DUO-NEB) 0.5-2.5 mg/3 mL nebulizer solution, Take 3 mL by nebulization every 8 (eight) hours as needed for wheezing or shortness of breath, Disp: 60 mL, Rfl: 3    Lab, Imaging and other studies: I have personally reviewed pertinent labs.  Laboratory Results:  Results for orders placed or performed in visit on 02/21/24   Protime-INR   Result Value Ref Range    INR 4.70 (A) 0.84 - 1.19             Invalid input(s): \"ALBUMIN\"        Radiology review:   chest X-ray    Ultrasound      Portions of the record may have been created with voice recognition software.  Occasional wrong word or \"sound a like\" substitutions may have occurred due to the inherent limitations of voice recognition software.  Read the chart carefully and recognize, using context, where substitutions have occurred.                    "

## 2024-02-29 NOTE — PATIENT INSTRUCTIONS
Visit summary:  - Overall kidney function is stable  - Blood pressure seems also acceptable it slightly low but in general doing well.  Most of your medications are for the heart in terms of the metoprolol and Lasix or furosemide.  Continue off amlodipine.    We are going to refer you to our kidney smart program which is an educational program that gives you helpful information to keep you on your kidneys healthy        1. Medication changes today:  None today    2.  General instructions:  Avoid any salt  Please follow a low potassium diet please see below for foods that have a lot of potassium in the        3.  Please take 1 week a blood pressure readings in 6 months prior to next visit    AS FOLLOWS  MORNING AND EVENING, SITTING AND STANDING as follows:  TAKE THE MORNING READINGS BEFORE ANY MEDICATIONS AND WHEN YOU ARE RELAXED FOR SEVERAL MINUTES  TAKE THE EVENING READINGS:  BETWEEN 7-10 P.M.; PRIOR TO ANY MEDICATIONS; AT LEAST IN OUR  FROM DINNER; AND CERTAINLY AFTER RELAXING FOR A FEW MINUTES  PLEASE INCLUDE HEART RATE WITH YOUR BLOOD PRESSURE READINGS  When taking standing readings, keep your arm supported at heart level and not dangling  Make sure you are sitting with your back supported and feet on the ground and do not cross your legs or feet  Make sure you have not taken any coffee or caffeine products or exercised or smoke cigarettes at least 30 minutes before taking your blood pressure  Then please mail these readings into the office      4.  In 3 months:  Please go for nonfasting lab work but in the morning        5.  Follow-up in 6 months  Please bring in 1 week a blood pressure readings morning evening, sitting and standing is outlined above  PLEASE BRING AN YOUR BLOOD PRESSURE MACHINE TO CORRELATE WITH THE OFFICE MACHINE AT THIS NEXT SCHEDULED VISIT  Please go for fasting lab work 1-2 weeks prior to your appointment      6.  General non medical recommendations:  AVOID SALT BUT NOT ADDING AN  READING LABELS TO MAKE SURE THERE IS LOW-SALT IN THE FOOD THAT YOU ARE EATING  Goal is less than 2 g of sodium intake or less than 5 g of sodium chloride intake per day    Avoid nonsteroidal anti-inflammatory drugs such as Naprosyn, ibuprofen, Aleve, Advil, Celebrex, Meloxicam (Mobic) etc.  You can use Tylenol as needed if you do not have any liver condition to be concerned about    Avoid medications such as Sudafed or decongestants and antihistamines that contained the D component which is the decongestant.  You can take antihistamines without the decongestant or D component.    Try to avoid medications such as pantoprazole or  Protonix/Nexium or Esomeprazole)/Prilosec or omeprazole/Prevacid or lansoprazole/AcipHex or Rabeprazole.  If you are able to, use Pepcid as this is safer for your kidneys.    Try to exercise at least 30 minutes 3 days a week to begin with with an ultimate goal of 5 days a week for at least 30 minutes    Please do not drink more than 2 glasses of alcohol/wine on a daily basis as this may contribute to your high blood pressure.

## 2024-03-01 ENCOUNTER — TELEPHONE (OUTPATIENT)
Dept: OTHER | Facility: OTHER | Age: 64
End: 2024-03-01

## 2024-03-05 ENCOUNTER — TELEPHONE (OUTPATIENT)
Dept: FAMILY MEDICINE CLINIC | Facility: CLINIC | Age: 64
End: 2024-03-05

## 2024-03-05 ENCOUNTER — ANTICOAG VISIT (OUTPATIENT)
Dept: FAMILY MEDICINE CLINIC | Facility: CLINIC | Age: 64
End: 2024-03-05

## 2024-03-05 LAB
INR PPP: 2.3
INR PPP: 2.3 (ref 0.84–1.19)
PROTHROMBIN TIME: 22.7 SEC (ref 9–11.5)

## 2024-03-05 NOTE — TELEPHONE ENCOUNTER
Spoke with Constanza and informed of patients INR and coumadin dosing instructions. Flowsheet updated.

## 2024-03-05 NOTE — TELEPHONE ENCOUNTER
----- Message from TULIO Finney sent at 3/5/2024  2:01 PM EST -----  INR 2.3 and in range  Continue current Coumadin dose.  INR in 2 weeks

## 2024-03-18 DIAGNOSIS — D62 ACUTE BLOOD LOSS ANEMIA: ICD-10-CM

## 2024-03-18 DIAGNOSIS — K92.1 MELENA: ICD-10-CM

## 2024-03-19 RX ORDER — PANTOPRAZOLE SODIUM 40 MG/1
40 TABLET, DELAYED RELEASE ORAL DAILY
Qty: 90 TABLET | Refills: 1 | Status: SHIPPED | OUTPATIENT
Start: 2024-03-19

## 2024-03-20 DIAGNOSIS — F41.0 PANIC ATTACK: ICD-10-CM

## 2024-03-20 RX ORDER — LORAZEPAM 0.5 MG/1
0.5 TABLET ORAL 2 TIMES DAILY PRN
Qty: 60 TABLET | Refills: 2 | Status: SHIPPED | OUTPATIENT
Start: 2024-03-20

## 2024-03-26 DIAGNOSIS — D50.8 IRON DEFICIENCY ANEMIA SECONDARY TO INADEQUATE DIETARY IRON INTAKE: ICD-10-CM

## 2024-03-26 DIAGNOSIS — F41.0 PANIC ATTACK: ICD-10-CM

## 2024-03-26 RX ORDER — FERROUS SULFATE 325(65) MG
325 TABLET ORAL
Qty: 45 TABLET | Refills: 1 | Status: SHIPPED | OUTPATIENT
Start: 2024-03-26

## 2024-03-26 RX ORDER — LORAZEPAM 0.5 MG/1
0.5 TABLET ORAL 2 TIMES DAILY PRN
Qty: 60 TABLET | Refills: 0 | OUTPATIENT
Start: 2024-03-26

## 2024-03-26 NOTE — TELEPHONE ENCOUNTER
Reason for call:   [x] Refill   [] Prior Auth  [x] Other: NOT A DUPLICATE Pt's caregiver called upset and stated pharmacy keeps telling her script not at pharmacy for Lorazepam even though it was sent on 3/20/2024. She also stated Pt is completely out and she needs her meds.    Office:   [x] PCP/Provider -   [] Specialty/Provider -     Medication: LORazepam 0.5 mg / 1 tab BID (60 tabs)                      ferrous sulfate 325 / 1 tab daily (45 tabs)      Pharmacy: [facility]/pharmacy #[id number] - [place] [address]     Does the patient have enough for 3 days?   [] Yes   [x] No - Send as HP to POD

## 2024-04-02 DIAGNOSIS — J96.11 CHRONIC RESPIRATORY FAILURE WITH HYPOXIA (HCC): Primary | ICD-10-CM

## 2024-04-02 NOTE — PROGRESS NOTES
Overnight oximetry ordered.  I am not sure that this ever took place after we met last August.  Her DME provider needs this information in order to qualify her for continued oxygen therapy.

## 2024-04-05 LAB
DME PARACHUTE DELIVERY DATE REQUESTED: NORMAL
DME PARACHUTE ITEM DESCRIPTION: NORMAL
DME PARACHUTE ORDER STATUS: NORMAL
DME PARACHUTE SUPPLIER NAME: NORMAL
DME PARACHUTE SUPPLIER PHONE: NORMAL

## 2024-04-25 ENCOUNTER — OFFICE VISIT (OUTPATIENT)
Dept: FAMILY MEDICINE CLINIC | Facility: CLINIC | Age: 64
End: 2024-04-25
Payer: COMMERCIAL

## 2024-04-25 VITALS
BODY MASS INDEX: 49.78 KG/M2 | SYSTOLIC BLOOD PRESSURE: 112 MMHG | HEART RATE: 73 BPM | DIASTOLIC BLOOD PRESSURE: 70 MMHG | OXYGEN SATURATION: 96 % | TEMPERATURE: 97.5 F | HEIGHT: 64 IN

## 2024-04-25 DIAGNOSIS — E11.9 TYPE 2 DIABETES MELLITUS WITHOUT COMPLICATION, WITH LONG-TERM CURRENT USE OF INSULIN (HCC): Primary | ICD-10-CM

## 2024-04-25 DIAGNOSIS — Z79.4 TYPE 2 DIABETES MELLITUS WITHOUT COMPLICATION, WITH LONG-TERM CURRENT USE OF INSULIN (HCC): Primary | ICD-10-CM

## 2024-04-25 DIAGNOSIS — I48.0 PAROXYSMAL ATRIAL FIBRILLATION (HCC): Chronic | ICD-10-CM

## 2024-04-25 DIAGNOSIS — F41.9 ANXIETY: ICD-10-CM

## 2024-04-25 DIAGNOSIS — E78.5 DYSLIPIDEMIA: ICD-10-CM

## 2024-04-25 DIAGNOSIS — J44.89 COPD WITH ASTHMA: ICD-10-CM

## 2024-04-25 DIAGNOSIS — D68.9 COAGULOPATHY (HCC): ICD-10-CM

## 2024-04-25 DIAGNOSIS — K92.1 MELENA: ICD-10-CM

## 2024-04-25 DIAGNOSIS — K21.9 GASTROESOPHAGEAL REFLUX DISEASE WITHOUT ESOPHAGITIS: ICD-10-CM

## 2024-04-25 DIAGNOSIS — F33.9 DEPRESSION, RECURRENT (HCC): ICD-10-CM

## 2024-04-25 DIAGNOSIS — I48.11 LONGSTANDING PERSISTENT ATRIAL FIBRILLATION (HCC): ICD-10-CM

## 2024-04-25 DIAGNOSIS — E03.8 OTHER SPECIFIED HYPOTHYROIDISM: ICD-10-CM

## 2024-04-25 DIAGNOSIS — D62 ACUTE BLOOD LOSS ANEMIA: ICD-10-CM

## 2024-04-25 DIAGNOSIS — J96.12 CHRONIC RESPIRATORY FAILURE WITH HYPERCAPNIA (HCC): ICD-10-CM

## 2024-04-25 PROCEDURE — G2211 COMPLEX E/M VISIT ADD ON: HCPCS | Performed by: NURSE PRACTITIONER

## 2024-04-25 PROCEDURE — 99214 OFFICE O/P EST MOD 30 MIN: CPT | Performed by: NURSE PRACTITIONER

## 2024-04-25 RX ORDER — METOPROLOL TARTRATE 50 MG/1
50 TABLET, FILM COATED ORAL 2 TIMES DAILY
Qty: 180 TABLET | Refills: 3 | Status: SHIPPED | OUTPATIENT
Start: 2024-04-25

## 2024-04-25 RX ORDER — PANTOPRAZOLE SODIUM 40 MG/1
40 TABLET, DELAYED RELEASE ORAL DAILY
Qty: 90 TABLET | Refills: 3 | Status: SHIPPED | OUTPATIENT
Start: 2024-04-25

## 2024-04-25 RX ORDER — FAMOTIDINE 20 MG/1
20 TABLET, FILM COATED ORAL DAILY
Qty: 90 TABLET | Refills: 3 | Status: SHIPPED | OUTPATIENT
Start: 2024-04-25

## 2024-04-25 RX ORDER — WARFARIN SODIUM 3 MG/1
3 TABLET ORAL DAILY
Qty: 90 TABLET | Refills: 3 | Status: SHIPPED | OUTPATIENT
Start: 2024-04-25

## 2024-04-25 NOTE — PROGRESS NOTES
Assessment/Plan:    1. Type 2 diabetes mellitus without complication, with long-term current use of insulin (HCC)  -     POCT hemoglobin A1c  -     Hemoglobin A1C; Future; Expected date: 04/25/2024    2. Paroxysmal atrial fibrillation (HCC)  -     metoprolol tartrate (LOPRESSOR) 50 mg tablet; Take 1 tablet (50 mg total) by mouth 2 (two) times a day  -     Protime-INR; Future  -     Protime-INR    3. Longstanding persistent atrial fibrillation (HCC)  -     warfarin (COUMADIN) 3 mg tablet; Take 1 tablet (3 mg total) by mouth daily    4. Gastroesophageal reflux disease without esophagitis  -     famotidine (PEPCID) 20 mg tablet; Take 1 tablet (20 mg total) by mouth daily    5. Anxiety  -     sertraline (ZOLOFT) 50 mg tablet; Take 1 tablet (50 mg total) by mouth daily    6. Melena  -     pantoprazole (PROTONIX) 40 mg tablet; Take 1 tablet (40 mg total) by mouth daily    7. Acute blood loss anemia  -     pantoprazole (PROTONIX) 40 mg tablet; Take 1 tablet (40 mg total) by mouth daily    8. Coagulopathy (HCC)  -     Protime-INR; Future  -     Protime-INR    9. Chronic respiratory failure with hypercapnia (HCC)    10. COPD with asthma    11. Depression, recurrent (HCC)    12. Dyslipidemia  -     Lipid panel; Future  -     Lipid panel    13. Other specified hypothyroidism  -     TSH, 3rd generation with Free T4 reflex        The case discussed with patient using patient centered shared decision making.The patient was counseled regarding instructions for management,-- risk factor reductions,-- prognosis,-- impressions,-- risks and benefits of treatment options,-- importance of compliance with treatment. I have reviewed the instructions with the patient, answering all questions to her satisfaction.    1.  Hypertension  Controlled at present    2.  Atrial fibrillation rate controlled  Continue Coumadin  Patient has difficulty getting blood draws in setting of ambulatory difficulties, wheelchair-bound  She had supratherapeutic  INR of 14 approximately 8 months ago  For the sake of safety and compliance, home INR monitor medically necessary for Coumadin management    3.  Other chronic conditions-CKD, COPD,   Controlled  Comanaged with specialty    Patient clinically stable today      Depression Screening and Follow-up Plan: Patient was screened for depression during today's encounter. They screened negative with a PHQ-9 score of 0.    Falls Plan of Care: not completed because patient not ambulatory, bed ridden, immobile, confined to chair, or wheelchair bound.     Urinary Incontinence Plan of Care: counseling topics discussed: use restroom every 2 hours and limiting fluid intake 3-4 hours before bed.            Return in about 6 months (around 10/25/2024), or AWV.    I have spent a total time of 35 minutes on 04/25/24 in caring for this patient including Diagnostic results, Prognosis, Risks and benefits of tx options, Instructions for management, Patient and family education, Importance of tx compliance, Risk factor reductions, Impressions, Counseling / Coordination of care, Documenting in the medical record, Reviewing / ordering tests, medicine, procedures  , Obtaining or reviewing history   and Communicating with other healthcare professionals .    Subjective:      Patient ID: Adelina Soto is a 64 y.o. female.    Chief Complaint   Patient presents with   • Follow-up     6 mo f/u visit        Here for chronic condition follow up  Arrives with significant other  Uses motorized wheelchair     Recent labs performed for nephrology. CKD, cbc stable    Is doing well overall  Compliant with plan of care  Has no complaint of today    Needs new hospital bed  Due for A1c, inr    Reviewed medical history in detail. Changes to medical record changed where appropriate. Reviewed medications. Patient taking as prescribed. Tolerating well. Denies Side effects. Reviewed recent visits with specialists co-managing chronic conditions.     The following  portions of the patient's history were reviewed and updated as appropriate: allergies, current medications, past family history, past medical history, past social history, past surgical history and problem list.    Review of Systems   Constitutional:  Positive for fatigue. Negative for fever.   HENT:  Negative for trouble swallowing.    Respiratory:  Positive for shortness of breath. Negative for cough.         Oxygen dependant   Cardiovascular:  Positive for leg swelling. Negative for chest pain and palpitations.   Gastrointestinal:  Positive for nausea. Negative for abdominal pain.   Genitourinary:  Negative for hematuria.   Musculoskeletal:  Positive for arthralgias and gait problem.   Skin:  Negative for pallor and wound.   Neurological:  Positive for weakness. Negative for dizziness and headaches.   Hematological:  Does not bruise/bleed easily.   Psychiatric/Behavioral:  Positive for dysphoric mood. Negative for suicidal ideas. The patient is nervous/anxious.          Current Outpatient Medications   Medication Sig Dispense Refill   • albuterol (2.5 mg/3 mL) 0.083 % nebulizer solution INHALE CONTENTS OF 1 VIAL ( 3 MILLILITERS ) IN NEBULIZER BY MOUTH AND INTO THE LUNGS EVERY 6 HOURS IF NEEDED FOR WHEEZING OR SHORTNESS OF BREATH 75 mL 5   • albuterol (PROVENTIL HFA,VENTOLIN HFA) 90 mcg/act inhaler INHALE 1 PUFF EVERY 4 HOURS AS NEEDED FOR WHEEZING. 6.7 g 1   • atorvastatin (LIPITOR) 10 mg tablet Take 1 tablet (10 mg total) by mouth daily 90 tablet 3   • Blood Glucose Monitoring Suppl (Tang Songace Pro Glucose Meter) SAVANNAH Use 2 (two) times a day Tang Songace glucometer, test twice daily 1 each 0   • cholecalciferol (VITAMIN D3) 400 units tablet Take 1 tablet (400 Units total) by mouth daily 100 tablet 1   • famotidine (PEPCID) 20 mg tablet Take 1 tablet (20 mg total) by mouth daily 90 tablet 3   • ferrous sulfate 325 (65 Fe) mg tablet Take 1 tablet (325 mg total) by mouth daily with breakfast Every other day 45 tablet 1   •  furosemide (LASIX) 40 mg tablet Take 1 tablet (40 mg total) by mouth daily (Patient taking differently: Take 40 mg by mouth daily And and as needed dose) 90 tablet 3   • hydrOXYzine HCL (ATARAX) 50 mg tablet Take 1 tablet (50 mg total) by mouth daily at bedtime 90 tablet 3   • ipratropium-albuterol (DUO-NEB) 0.5-2.5 mg/3 mL nebulizer solution Take 3 mL by nebulization every 8 (eight) hours as needed for wheezing or shortness of breath 60 mL 3   • levothyroxine 100 mcg tablet Take 1 tablet (100 mcg total) by mouth daily 90 tablet 3   • LORazepam (ATIVAN) 0.5 mg tablet Take 1 tablet (0.5 mg total) by mouth 2 (two) times a day as needed for anxiety 60 tablet 2   • metoprolol tartrate (LOPRESSOR) 50 mg tablet Take 1 tablet (50 mg total) by mouth 2 (two) times a day 180 tablet 3   • nystatin (MYCOSTATIN) cream Apply topically 2 (two) times a day 30 g 3   • pantoprazole (PROTONIX) 40 mg tablet Take 1 tablet (40 mg total) by mouth daily 90 tablet 3   • sertraline (ZOLOFT) 50 mg tablet Take 1 tablet (50 mg total) by mouth daily 90 tablet 3   • triamcinolone (KENALOG) 0.1 % ointment Apply topically 2 (two) times a day 160 g 4   • warfarin (Coumadin) 1 mg tablet Take 1 tablet (1 mg total) by mouth daily (Patient taking differently: Take 1 mg by mouth daily Monday,Wednesday, Friday 3 mg Sunday, Saturday, Tuesday, Thursday 1 mg) 90 tablet 2   • warfarin (COUMADIN) 3 mg tablet Take 1 tablet (3 mg total) by mouth daily 90 tablet 3   • amLODIPine (NORVASC) 5 mg tablet take 1 tablet by mouth once daily (Patient not taking: Reported on 2/29/2024) 90 tablet 2   • collagenase (SANTYL) ointment Apply topically daily (Patient not taking: Reported on 5/26/2023) 15 g 1   • Embrace Lancets Ultra Thin 30G MISC Use 2 (two) times a day (Patient not taking: Reported on 7/19/2023) 100 each 5   • glucose blood (Embrace Blood Glucose Test) test strip Use as instructed to check sugar bid (Patient not taking: Reported on 5/26/2023) 100 strip 4  "    No current facility-administered medications for this visit.       Patient Active Problem List   Diagnosis   • Hypertensive chronic kidney disease with stage 1 through stage 4 chronic kidney disease, or unspecified chronic kidney disease   • Persistent proteinuria   • Iron deficiency   • Dyslipidemia   • Hyperparathyroidism (HCC)   • Cellulitis   • H/O right nephrectomy   • Paroxysmal atrial fibrillation (HCC)   • COPD with asthma   • Hypothyroid   • Chronic constipation   • GERD (gastroesophageal reflux disease)   • Anxiety   • Secondary hyperparathyroidism of renal origin (HCC)   • Morbid obesity with BMI of 50.0-59.9, adult (HCC)   • Depression, recurrent (HCC)   • Pre-ulcerative corn or callous   • Cardiomegaly   • Abnormal CT scan   • Panniculus   • Bilateral pleural effusion   • Panniculitis   • History of hysterectomy   • CKD (chronic kidney disease)   • Anemia in stage 3b chronic kidney disease  (HCC)   • Chronic respiratory failure with hypoxia (HCC)   • Chronic respiratory failure with hypercapnia (HCC)   • Chronic anticoagulation   • Type 2 diabetes mellitus with hyperglycemia, unspecified whether long term insulin use (HCC)   • Coagulopathy (HCC)       Objective:    /70   Pulse 73   Temp 97.5 °F (36.4 °C) (Temporal)   Ht 5' 4\" (1.626 m)   SpO2 96%   BMI 49.78 kg/m²        Physical Exam  Vitals and nursing note reviewed.   Constitutional:       General: She is not in acute distress.     Appearance: Normal appearance. She is obese.      Comments: Chronically ill female presents in motorized wheelchair  She is using oxygen 3 lpm continuously     HENT:      Head: Normocephalic and atraumatic.   Neck:      Vascular: No carotid bruit.   Cardiovascular:      Rate and Rhythm: Normal rate. Rhythm irregular.      Pulses: Pulses are weak.           Radial pulses are 2+ on the right side and 2+ on the left side.        Dorsalis pedis pulses are 2+ on the right side and 2+ on the left side.        " Posterior tibial pulses are 1+ on the right side and 1+ on the left side.      Heart sounds: Normal heart sounds.      Comments: Distant heart tones due to body habitus  Pulmonary:      Effort: Pulmonary effort is normal.      Breath sounds: Normal breath sounds.      Comments: Distant breath sounds due to body habitus  Abdominal:      General: Bowel sounds are normal.      Palpations: Abdomen is soft.      Tenderness: There is no abdominal tenderness.   Musculoskeletal:      Right lower le+ Pitting Edema (trace) present.      Left lower le+ Pitting Edema (trace) present.   Feet:      Right foot:      Skin integrity: Callus and dry skin present. No ulcer, skin breakdown, erythema or warmth.      Left foot:      Skin integrity: Callus and dry skin present. No ulcer, skin breakdown, erythema or warmth.   Lymphadenopathy:      Cervical: No cervical adenopathy.   Skin:     General: Skin is warm and dry.      Coloration: Skin is not pale.      Comments: Lower extremities with pvd   Skin intact  No signs of cellulitis    Neurological:      General: No focal deficit present.      Mental Status: She is alert. Mental status is at baseline.      Sensory: Sensory deficit present.      Motor: Weakness present.      Gait: Gait abnormal.      Comments: Unable to stand   Wheel chair bound   Psychiatric:         Mood and Affect: Mood normal.         Behavior: Behavior normal.     Diabetic Foot Exam    Patient's shoes and socks removed.    Right Foot/Ankle   Right Foot Inspection  Skin Exam: skin normal, skin intact, dry skin, callus and callus. No warmth, no erythema, no maceration, no abnormal color, no pre-ulcer and no ulcer.     Toe Exam: ROM and strength within normal limits.     Sensory   Vibration: intact  Proprioception: intact  Monofilament testing: diminished    Vascular  Capillary refills: < 3 seconds  The right DP pulse is 2+. The right PT pulse is 1+.     Left Foot/Ankle  Left Foot Inspection  Skin Exam: skin  normal, skin intact, dry skin and callus. No warmth, no erythema, no maceration, normal color, no pre-ulcer and no ulcer.     Toe Exam: ROM and strength within normal limits.     Sensory   Vibration: intact  Proprioception: intact  Monofilament testing: diminished    Vascular  Capillary refills: < 3 seconds  The left DP pulse is 2+. The left PT pulse is 1+.     Assign Risk Category  No deformity present  Loss of protective sensation  Weak pulses  Risk: 2           CaitieTULIO Rudd

## 2024-04-30 LAB
CHOLEST SERPL-MCNC: 140 MG/DL
CHOLEST/HDLC SERPL: 4.8 (CALC)
HBA1C MFR BLD: 6 % OF TOTAL HGB
HDLC SERPL-MCNC: 29 MG/DL
INR PPP: 2.7
LDLC SERPL CALC-MCNC: 82 MG/DL (CALC)
NONHDLC SERPL-MCNC: 111 MG/DL (CALC)
PROTHROMBIN TIME: 26.2 SEC (ref 9–11.5)
TRIGL SERPL-MCNC: 203 MG/DL
TSH SERPL-ACNC: 3.02 MIU/L (ref 0.4–4.5)

## 2024-04-30 PROCEDURE — 3044F HG A1C LEVEL LT 7.0%: CPT | Performed by: NURSE PRACTITIONER

## 2024-05-01 ENCOUNTER — ANTICOAG VISIT (OUTPATIENT)
Dept: FAMILY MEDICINE CLINIC | Facility: CLINIC | Age: 64
End: 2024-05-01

## 2024-05-01 NOTE — PROGRESS NOTES
Called and spoke w pt caretaker -- she was informed of INR 2.7 and advised to cont current coumadin dose and re-ck in 1 mo time.

## 2024-06-04 ENCOUNTER — TELEPHONE (OUTPATIENT)
Dept: NEPHROLOGY | Facility: CLINIC | Age: 64
End: 2024-06-04

## 2024-06-04 LAB
BUN SERPL-MCNC: 25 MG/DL (ref 7–25)
BUN/CREAT SERPL: 13 (CALC) (ref 6–22)
CALCIUM SERPL-MCNC: 9.1 MG/DL (ref 8.6–10.4)
CHLORIDE SERPL-SCNC: 102 MMOL/L (ref 98–110)
CO2 SERPL-SCNC: 33 MMOL/L (ref 20–32)
CREAT SERPL-MCNC: 1.96 MG/DL (ref 0.5–1.05)
CREAT UR-MCNC: 70 MG/DL (ref 20–275)
GFR/BSA.PRED SERPLBLD CYS-BASED-ARV: 28 ML/MIN/1.73M2
GLUCOSE SERPL-MCNC: 95 MG/DL (ref 65–99)
MAGNESIUM SERPL-MCNC: 1.9 MG/DL (ref 1.5–2.5)
POTASSIUM SERPL-SCNC: 4.4 MMOL/L (ref 3.5–5.3)
PROT UR-MCNC: 27 MG/DL (ref 5–24)
PROT/CREAT UR: 0.39 MG/MG CREAT (ref 0.02–0.18)
PROT/CREAT UR: 386 MG/G CREAT (ref 24–184)
SODIUM SERPL-SCNC: 139 MMOL/L (ref 135–146)

## 2024-06-04 NOTE — TELEPHONE ENCOUNTER
Called and spoke to the patient's care giver, Constanza, to relay the message above. Constanza expressed understanding and had no further questions or concerns at this time. High magnesium diet info mailed per request.

## 2024-06-04 NOTE — TELEPHONE ENCOUNTER
----- Message from Vivienne Wade PA-C sent at 6/4/2024 11:09 AM EDT -----  Labs reviewed.  Renal function stable and at baseline.  Magnesium slightly low.  Encourage high magnesium containing foods.  Repeat labs in 3 months prior to follow-up appointment

## 2024-06-14 DIAGNOSIS — J44.9 CHRONIC OBSTRUCTIVE PULMONARY DISEASE, UNSPECIFIED COPD TYPE (HCC): ICD-10-CM

## 2024-06-14 RX ORDER — ALBUTEROL SULFATE 2.5 MG/3ML
SOLUTION RESPIRATORY (INHALATION)
Qty: 125 ML | Refills: 11 | Status: SHIPPED | OUTPATIENT
Start: 2024-06-14

## 2024-07-11 DIAGNOSIS — J96.12 CHRONIC RESPIRATORY FAILURE WITH HYPERCAPNIA (HCC): ICD-10-CM

## 2024-07-11 RX ORDER — ALBUTEROL SULFATE 90 UG/1
1 AEROSOL, METERED RESPIRATORY (INHALATION) EVERY 4 HOURS PRN
Qty: 6.7 G | Refills: 1 | Status: SHIPPED | OUTPATIENT
Start: 2024-07-11

## 2024-07-11 NOTE — TELEPHONE ENCOUNTER
Reason for call:   [x] Refill   [] Prior Auth  [] Other:     Office:   [x] PCP/Provider - Omarer Cecy  [] Specialty/Provider -     Medication:   Albuterol HFA 6.7      Pharmacy:   [facility]    Does the patient have enough for 3 days?   [] Yes   [x] No - Send as HP to POD

## 2024-07-19 NOTE — TELEPHONE ENCOUNTER
Patient called requesting status of refill for albuterol inhaler. Patient made aware medication was refilled on 07/11/24 for 6.7gr with 1 refills to [facility] pharmacy. Patient instructed to contact the pharmacy to obtain refills of medication. Patient verbalized understanding.

## 2024-07-26 ENCOUNTER — TELEPHONE (OUTPATIENT)
Dept: NEPHROLOGY | Facility: CLINIC | Age: 64
End: 2024-07-26

## 2024-08-01 ENCOUNTER — TELEPHONE (OUTPATIENT)
Dept: NEPHROLOGY | Facility: CLINIC | Age: 64
End: 2024-08-01

## 2024-08-01 LAB
ALBUMIN SERPL-MCNC: 3.3 G/DL (ref 3.6–5.1)
ALBUMIN/GLOB SERPL: 0.9 (CALC) (ref 1–2.5)
ALP SERPL-CCNC: 139 U/L (ref 37–153)
ALT SERPL-CCNC: 9 U/L (ref 6–29)
AST SERPL-CCNC: 13 U/L (ref 10–35)
BILIRUB SERPL-MCNC: 0.5 MG/DL (ref 0.2–1.2)
BUN SERPL-MCNC: 23 MG/DL (ref 7–25)
BUN/CREAT SERPL: 12 (CALC) (ref 6–22)
CALCIUM SERPL-MCNC: 8.7 MG/DL (ref 8.6–10.4)
CHLORIDE SERPL-SCNC: 104 MMOL/L (ref 98–110)
CHOLEST SERPL-MCNC: 129 MG/DL
CHOLEST/HDLC SERPL: 4.3 (CALC)
CO2 SERPL-SCNC: 28 MMOL/L (ref 20–32)
CREAT SERPL-MCNC: 1.86 MG/DL (ref 0.5–1.05)
CREAT UR-MCNC: 46 MG/DL (ref 20–275)
ERYTHROCYTE [DISTWIDTH] IN BLOOD BY AUTOMATED COUNT: 14.6 % (ref 11–15)
FERRITIN SERPL-MCNC: 73 NG/ML (ref 16–288)
GFR/BSA.PRED SERPLBLD CYS-BASED-ARV: 30 ML/MIN/1.73M2
GLOBULIN SER CALC-MCNC: 3.6 G/DL (CALC) (ref 1.9–3.7)
GLUCOSE SERPL-MCNC: 87 MG/DL (ref 65–99)
HCT VFR BLD AUTO: 36.7 % (ref 35–45)
HDLC SERPL-MCNC: 30 MG/DL
HGB BLD-MCNC: 11.8 G/DL (ref 11.7–15.5)
IRON SATN MFR SERPL: 21 % (CALC) (ref 16–45)
IRON SERPL-MCNC: 58 MCG/DL (ref 45–160)
LDLC SERPL CALC-MCNC: 74 MG/DL (CALC)
MAGNESIUM SERPL-MCNC: 2 MG/DL (ref 1.5–2.5)
MCH RBC QN AUTO: 30.3 PG (ref 27–33)
MCHC RBC AUTO-ENTMCNC: 32.2 G/DL (ref 32–36)
MCV RBC AUTO: 94.1 FL (ref 80–100)
NONHDLC SERPL-MCNC: 99 MG/DL (CALC)
PHOSPHATE SERPL-MCNC: 3.3 MG/DL (ref 2.1–4.3)
PLATELET # BLD AUTO: 273 THOUSAND/UL (ref 140–400)
PMV BLD REES-ECKER: 9.7 FL (ref 7.5–12.5)
POTASSIUM SERPL-SCNC: 4.5 MMOL/L (ref 3.5–5.3)
PROT SERPL-MCNC: 6.9 G/DL (ref 6.1–8.1)
PROT UR-MCNC: 20 MG/DL (ref 5–24)
PROT/CREAT UR: 0.43 MG/MG CREAT (ref 0.02–0.18)
PROT/CREAT UR: 435 MG/G CREAT (ref 24–184)
PTH-INTACT SERPL-MCNC: 120 PG/ML (ref 16–77)
RBC # BLD AUTO: 3.9 MILLION/UL (ref 3.8–5.1)
SODIUM SERPL-SCNC: 139 MMOL/L (ref 135–146)
TIBC SERPL-MCNC: 278 MCG/DL (CALC) (ref 250–450)
TRIGL SERPL-MCNC: 168 MG/DL
WBC # BLD AUTO: 8.4 THOUSAND/UL (ref 3.8–10.8)

## 2024-08-01 NOTE — TELEPHONE ENCOUNTER
Spoke with patient's care taker, Constanza, about the following, she expressed understanding and thanked us for the call:    ----- Message from Delmar Winchester MD sent at 8/1/2024  7:13 AM EDT -----  Iron every other day over-the-counter  ----- Message -----  From: [facility] Results In  Sent: 6/4/2024   8:00 AM EDT  To: Delmar Winchester MD

## 2024-08-02 LAB
ALBUMIN SERPL-MCNC: 3.3 G/DL (ref 3.6–5.1)
ALBUMIN/GLOB SERPL: 0.9 (CALC) (ref 1–2.5)
ALP SERPL-CCNC: 144 U/L (ref 37–153)
ALT SERPL-CCNC: 9 U/L (ref 6–29)
AST SERPL-CCNC: 13 U/L (ref 10–35)
BILIRUB SERPL-MCNC: 0.5 MG/DL (ref 0.2–1.2)
BUN SERPL-MCNC: 23 MG/DL (ref 7–25)
BUN/CREAT SERPL: 12 (CALC) (ref 6–22)
CALCIUM SERPL-MCNC: 8.8 MG/DL (ref 8.6–10.4)
CHLORIDE SERPL-SCNC: 104 MMOL/L (ref 98–110)
CHOLEST SERPL-MCNC: 130 MG/DL
CHOLEST/HDLC SERPL: 4.5 (CALC)
CO2 SERPL-SCNC: 30 MMOL/L (ref 20–32)
CREAT SERPL-MCNC: 1.85 MG/DL (ref 0.5–1.05)
FERRITIN SERPL-MCNC: 76 NG/ML (ref 16–288)
GFR/BSA.PRED SERPLBLD CYS-BASED-ARV: 30 ML/MIN/1.73M2
GLOBULIN SER CALC-MCNC: 3.7 G/DL (CALC) (ref 1.9–3.7)
GLUCOSE SERPL-MCNC: 87 MG/DL (ref 65–99)
HDLC SERPL-MCNC: 29 MG/DL
LDLC SERPL CALC-MCNC: 74 MG/DL (CALC)
MAGNESIUM SERPL-MCNC: 1.9 MG/DL (ref 1.5–2.5)
NONHDLC SERPL-MCNC: 101 MG/DL (CALC)
PHOSPHATE SERPL-MCNC: 3.1 MG/DL (ref 2.1–4.3)
POTASSIUM SERPL-SCNC: 4.6 MMOL/L (ref 3.5–5.3)
PROT SERPL-MCNC: 7 G/DL (ref 6.1–8.1)
PTH-INTACT SERPL-MCNC: 93 PG/ML (ref 16–77)
SODIUM SERPL-SCNC: 139 MMOL/L (ref 135–146)
TRIGL SERPL-MCNC: 168 MG/DL

## 2024-08-20 PROBLEM — E11.21 DIABETIC NEPHROPATHY ASSOCIATED WITH TYPE 2 DIABETES MELLITUS (HCC): Status: ACTIVE | Noted: 2024-08-20

## 2024-08-20 NOTE — PROGRESS NOTES
RENAL FOLLOW UP NOTE:.td    ASSESSMENT AND PLAN:  1.  CKD stage 3.:  Etiology:  Right radical nephrectomy July 15, 2016 for renal cell CA/hypertensive nephrosclerosis/arteriolar nephrosclerosis/diabetic nephropathy/?  Morbid obesity with?  FSGS  Baseline creatinine:  new baseline appears to be from 1.8-as high as 2.4  Current creatinine:  1.85 at baseline from 7/31/2024  Urine protein creatinine ratio: 0.435 g at goal  UA microscopic no proteinuria, trace intact heme from 05/13/2020  Recommendations:  Treat hypertension-please see below  Treat dyslipidemia-please see below  Maintain proteinuria less than 1 g or as low as possible  Avoid nephrotoxic agents such as NSAIDs, patient counseled as such  Kidney smart referral this time     2.  Volume:  Current volume:  Euvolemic  Treatment:  Euvolemic currently off diuretics completely     3.  Hypertension:   Home blood pressures:    None today       Goal blood pressure:  Less than 130/80  Recommendations:    push nonmedical regimen including weight loss, and any form of exercise patient can not tolerate; avoidance of salt; patient counseled as such  Medication changes today:  Seems doing well.  Only medication is currently cardiac meds including furosemide and metoprolol with the atrial fibrillation.  She seems to be tolerating this regimen     4.  Electrolytes:  Chronic metabolic alkalosis most likely from primary CO2 retention, stable at 30; mildhyperkalemia doing well at 4.5     5.  Mineral bone disorder:  Secondary to CKD:  Calcium/magnesium/phosphorus:  All acceptable  PTH intact: 120 above goal will monitor and not overtreat  Vitamin-D:  47 at goal     6.  Dyslipidemia:  Goal LDL:  Less than 100  Current lipid profile: LDL 74/HDL 30/glycerides 168 from 7/31/2024  Recommendations: No changes as patient is at goal     7.  Anemia:    -Current value 11.8 essentially normal  -iron studies: Low ferritin at 73 from 7/31/2024 replete every other day  -B12 and folate  deficiency being treated  -multiple myeloma was ruled out in January  -GI evaluation in January of 2018:   colonoscopy demonstrated internal/external hemorrhoids; also colonic polyps which were removed.  There was a suboptimal colonic prep.  Felt she may require repeat colonoscopy in 3-6 months. EGD demonstrated small hiatal hernia.   GI evaluated the patient December 2022 because of anemia EGD demonstrated gastric duodenal AVMs requiring APC, follow-up secondary endoscopy because of dropping hemoglobin; colonoscopy demonstrated no active bleeding  Capsule endoscopy was recommended never done           8.  Other problems:  Right radical nephrectomy for renal cell CA July 15, 2016  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 2, 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  Morbid obesity  Atrial fibrillation  Cellulitis of the left leg back in January treated with intravenous antibiotics  COPD on iron  EGD involving multiple joints  Hypothyroidism on supplement  History of gait dysfunction using motorized scooter to ambulate  Diabetes mellitus/diabetic neuropathy  Admission 8/2021 for abdominal pain-workup is CT/EGD/colonoscopy/push enteroscopy all negative except for gastritis-esophagitis; the patient's symptoms resolved spontaneously.  Patient with a ventral hernia which she deferred in terms of surgical revision, will follow-up with surgery.  Admission to [facility] 12/11/2022 with abdominal wall cellulitis/anemia status posttransfusion  Colonoscopy 11/30/2022 demonstrated diverticula in the sigmoid colon otherwise normal colonoscopy  EGD with push enteroscopy was prematurely aborted secondary respiratory instability  Completed course of antibiotics including Keflex  Peak creatinine 2.19 on admission came down to 1.85  Admission December 3, 2022 with acute hypoxic hypercapnic respiratory failure: Felt to have multifocal pneumonia started on empiric antibiotics treated by pulmonary.  Also GI evaluation for  anemia please see above            GI health maintenance: 12/2/2022: Normal colonoscopy recommended repeat in 5 years which would be December 2027.         PATIENT INSTRUCTIONS:    Patient Instructions   Visit summary:  - Overall kidney labs look great the rest your labs are doing very well including your cholesterol  - Your blood pressure is also doing well    If you run into problems with leg swelling or fluid retention please contact me    1. Medication changes today:  No medication changes today    2.  General instructions:  Continue to be as active as possible continue to try to lose weight if you can      3.  Please take 1 week a blood pressure readings prior to your next appointment    AS FOLLOWS  MORNING AND EVENING, SITTING AND STANDING as follows:  TAKE THE MORNING READINGS BEFORE ANY MEDICATIONS AND WHEN YOU ARE RELAXED FOR SEVERAL MINUTES  TAKE THE EVENING READINGS:  BETWEEN 7-10 P.M.; PRIOR TO ANY MEDICATIONS; AT LEAST IN OUR  FROM DINNER; AND CERTAINLY AFTER RELAXING FOR A FEW MINUTES  PLEASE INCLUDE HEART RATE WITH YOUR BLOOD PRESSURE READINGS  When taking standing readings, keep your arm supported at heart level and not dangling  Make sure you are sitting with your back supported and feet on the ground and do not cross your legs or feet  Make sure you have not taken any coffee or caffeine products or exercised or smoke cigarettes at least 30 minutes before taking your blood pressure  Then please mail these readings into the office      4.  In 3 months:  Please go for nonfasting lab work but in the morning      5.  Follow-up in 6 months  Please bring in 1 week a blood pressure readings morning evening, sitting and standing is outlined above  PLEASE BRING AN YOUR BLOOD PRESSURE MACHINE TO CORRELATE WITH THE OFFICE MACHINE AT THIS NEXT SCHEDULED VISIT  Please go for fasting lab work 1-2 weeks prior to your appointment      6.  General non medical recommendations:  AVOID SALT BUT NOT ADDING AN  READING LABELS TO MAKE SURE THERE IS LOW-SALT IN THE FOOD THAT YOU ARE EATING  Goal is less than 2 g of sodium intake or less than 5 g of sodium chloride intake per day    Avoid nonsteroidal anti-inflammatory drugs such as Naprosyn, ibuprofen, Aleve, Advil, Celebrex, Meloxicam (Mobic) etc.  You can use Tylenol as needed if you do not have any liver condition to be concerned about    Avoid medications such as Sudafed or decongestants and antihistamines that contained the D component which is the decongestant.  You can take antihistamines without the decongestant or D component.    Try to avoid medications such as pantoprazole or  Protonix/Nexium or Esomeprazole)/Prilosec or omeprazole/Prevacid or lansoprazole/AcipHex or Rabeprazole.  If you are able to, use Pepcid as this is safer for your kidneys.    Try to exercise at least 30 minutes 3 days a week to begin with with an ultimate goal of 5 days a week for at least 30 minutes    Please do not drink more than 2 glasses of alcohol/wine on a daily basis as this may contribute to your high blood pressure.            Subjective:   There has been no hospitalizations or acute illnesses since last visit.  The patient overall is feeling well.  No fevers, chills, or cough or colds.  Good appetite and good energy  No hematuria, dysuria, voiding symptoms or foamy urine  No gastrointestinal symptoms  No chest pain, usual dyspnea on exertion, no edema  No headaches, dizziness or lightheadedness  Blood pressure medications:  Metoprolol tartrate 50 mg twice a day    Renal pertinent medications:  Pantoprazole 40 mg daily/Pepcid 20 mg daily  Atorvastatin 10 mg daily  Iron every other day  Vitamin D 400 units daily    ROS:  See HPI, otherwise review of systems as completely reviewed with the patient are negative    Past Medical History:   Diagnosis Date    Anemia     Arthritis     Cancer of kidney (HCC)     Chronic kidney disease     Diabetes mellitus (HCC)     Disease of thyroid gland      HLD (hyperlipidemia)     Hypertension     Ovarian cancer (HCC)     Pneumonia      Past Surgical History:   Procedure Laterality Date    CHOLECYSTECTOMY      CT GUIDED PERC DRAINAGE CATHETER PLACEMENT  5/16/2016    GALLBLADDER SURGERY  05/01/2004    HYSTERECTOMY  06/01/2018    NEPHRECTOMY Right 08/02/2018     Family History   Problem Relation Age of Onset    No Known Problems Mother     No Known Problems Father       reports that she quit smoking about 13 years ago. Her smoking use included cigarettes. She started smoking about 43 years ago. She has a 90 pack-year smoking history. She quit smokeless tobacco use about 12 years ago. She reports current drug use. Drug: Marijuana. She reports that she does not drink alcohol.    I COMPLETELY REVIEWED THE PAST MEDICAL HISTORY/PAST SURGICAL HISTORY/SOCIAL HISTORY/FAMILY HISTORY/AND MEDICATIONS  AND UPDATED ALL    Objective:     Vitals:   BP sitting on left: 114/68 with a heart rate of 68 and irregular    Weight (last 2 days)       Date/Time Weight    08/27/24 1414 --     Weight: w/c at 08/27/24 1414          Wt Readings from Last 3 Encounters:   08/30/23 132 kg (290 lb)   03/14/23 132 kg (290 lb 6.4 oz)   02/16/23 (!) 153 kg (338 lb)       Body mass index is 49.78 kg/m².    Physical Exam: General:  No acute distress/morbidly obese/in wheelchair  Skin:  No acute rash  Eyes:  No scleral icterus, noninjected, no discharge from eyes  ENT:  Moist mucous membranes  Neck:  Supple, no jugular venous distention, trachea is midline, no lymphadenopathy and no thyromegaly  Back   No CVAT  Chest:  Clear to auscultation and percussion, good respiratory effort  CVS: Irregular rhythm without a rub, or gallops or murmurs  Abdomen:  obese, Soft and nontender with normal bowel sounds  Extremities:  No cyanosis and no edema, no arthritic changes, normal range of motion/venous stasis changes  Neuro:  Grossly intact  Psych:  Alert, oriented x3 and appropriate      Medications:    Current  Outpatient Medications:     albuterol (2.5 mg/3 mL) 0.083 % nebulizer solution, USE 1 VIAL IN NEBULIZER EVERY 6 HOURS - as needed, Disp: 125 mL, Rfl: 11    albuterol (PROVENTIL HFA,VENTOLIN HFA) 90 mcg/act inhaler, Inhale 1 puff every 4 (four) hours as needed for wheezing, Disp: 6.7 g, Rfl: 1    atorvastatin (LIPITOR) 10 mg tablet, Take 1 tablet (10 mg total) by mouth daily, Disp: 90 tablet, Rfl: 3    Blood Glucose Monitoring Suppl (Comic Wonderace Pro Glucose Meter) SAVANNAH, Use 2 (two) times a day Comic Wonderace glucometer, test twice daily, Disp: 1 each, Rfl: 0    cholecalciferol (VITAMIN D3) 400 units tablet, Take 1 tablet (400 Units total) by mouth daily, Disp: 100 tablet, Rfl: 1    famotidine (PEPCID) 20 mg tablet, Take 1 tablet (20 mg total) by mouth daily, Disp: 90 tablet, Rfl: 3    ferrous sulfate 325 (65 Fe) mg tablet, Take 1 tablet (325 mg total) by mouth daily with breakfast Every other day, Disp: 45 tablet, Rfl: 1    furosemide (LASIX) 40 mg tablet, Take 1 tablet (40 mg total) by mouth daily (Patient taking differently: Take 40 mg by mouth daily And and as needed dose), Disp: 90 tablet, Rfl: 3    ipratropium (ATROVENT) 0.02 % nebulizer solution, USE 1 VIAL IN NEBULIZER 4 TIMES DAILY, Disp: 125 mL, Rfl: 11    ipratropium-albuterol (DUO-NEB) 0.5-2.5 mg/3 mL nebulizer solution, Take 3 mL by nebulization every 8 (eight) hours as needed for wheezing or shortness of breath, Disp: 60 mL, Rfl: 3    levothyroxine 100 mcg tablet, Take 1 tablet (100 mcg total) by mouth daily, Disp: 90 tablet, Rfl: 3    LORazepam (ATIVAN) 0.5 mg tablet, Take 1 tablet (0.5 mg total) by mouth 2 (two) times a day as needed for anxiety, Disp: 60 tablet, Rfl: 2    metoprolol tartrate (LOPRESSOR) 50 mg tablet, Take 1 tablet (50 mg total) by mouth 2 (two) times a day, Disp: 180 tablet, Rfl: 3    nystatin (MYCOSTATIN) cream, Apply topically 2 (two) times a day, Disp: 30 g, Rfl: 3    pantoprazole (PROTONIX) 40 mg tablet, Take 1 tablet (40 mg total) by mouth  daily, Disp: 90 tablet, Rfl: 3    sertraline (ZOLOFT) 50 mg tablet, Take 1 tablet (50 mg total) by mouth daily, Disp: 90 tablet, Rfl: 3    triamcinolone (KENALOG) 0.1 % ointment, Apply topically 2 (two) times a day, Disp: 160 g, Rfl: 4    warfarin (Coumadin) 1 mg tablet, Take 1 tablet (1 mg total) by mouth daily (Patient taking differently: Take 1 mg by mouth daily Monday,Wednesday, Friday 3 mg Sunday, Saturday, Tuesday, Thursday 1 mg), Disp: 90 tablet, Rfl: 2    warfarin (COUMADIN) 3 mg tablet, Take 1 tablet (3 mg total) by mouth daily, Disp: 90 tablet, Rfl: 3    amLODIPine (NORVASC) 5 mg tablet, take 1 tablet by mouth once daily (Patient not taking: Reported on 2/29/2024), Disp: 90 tablet, Rfl: 2    collagenase (SANTYL) ointment, Apply topically daily (Patient not taking: Reported on 5/26/2023), Disp: 15 g, Rfl: 1    Embrace Lancets Ultra Thin 30G MISC, Use 2 (two) times a day (Patient not taking: Reported on 7/19/2023), Disp: 100 each, Rfl: 5    glucose blood (Embrace Blood Glucose Test) test strip, Use as instructed to check sugar bid (Patient not taking: Reported on 5/26/2023), Disp: 100 strip, Rfl: 4    hydrOXYzine HCL (ATARAX) 50 mg tablet, Take 1 tablet (50 mg total) by mouth daily at bedtime (Patient not taking: Reported on 8/27/2024), Disp: 90 tablet, Rfl: 3    Lab, Imaging and other studies: I have personally reviewed pertinent labs.  Laboratory Results:  Results for orders placed or performed in visit on 04/25/24   Semi Electric Bed Package   Result Value Ref Range    Supplier Name [employer]     Supplier Phone Number [phone]     Order Status Cannot Dispense     Delivery Note      Delivery Request Date 04/25/2024     Item Description [facility]-Electric Hospital Bed, Gel Overlay     Item Description Standard [employer]     Item Description Gel Overlay     Item Description Half Bed Rails    TSH, 3rd generation with Free T4 reflex   Result Value Ref Range    TSH W/RFX TO FREE T4 3.02 0.40 - 4.50 mIU/L  "  Protime-INR   Result Value Ref Range    INR 2.7 (H)     Prothrombin Time 26.2 (H) 9.0 - 11.5 sec   Lipid panel   Result Value Ref Range    Total Cholesterol 140 <200 mg/dL    HDL 29 (L) > OR = 50 mg/dL    Triglycerides 203 (H) <150 mg/dL    LDL Calculated 82 mg/dL (calc)    Chol HDLC Ratio 4.8 <5.0 (calc)    Non-HDL Cholesterol 111 <130 mg/dL (calc)   Hemoglobin A1c (w/out EAG)   Result Value Ref Range    Hemoglobin A1C 6.0 (H) <5.7 % of total Hgb             Invalid input(s): \"ALBUMIN\"      Radiology review:   chest X-ray    Ultrasound      Portions of the record may have been created with voice recognition software.  Occasional wrong word or \"sound a like\" substitutions may have occurred due to the inherent limitations of voice recognition software.  Read the chart carefully and recognize, using context, where substitutions have occurred.                    "

## 2024-08-23 DIAGNOSIS — E03.8 OTHER SPECIFIED HYPOTHYROIDISM: ICD-10-CM

## 2024-08-23 DIAGNOSIS — E78.5 DYSLIPIDEMIA: ICD-10-CM

## 2024-08-23 RX ORDER — ATORVASTATIN CALCIUM 10 MG/1
10 TABLET, FILM COATED ORAL DAILY
Qty: 90 TABLET | Refills: 3 | Status: SHIPPED | OUTPATIENT
Start: 2024-08-23

## 2024-08-23 RX ORDER — LEVOTHYROXINE SODIUM 100 UG/1
100 TABLET ORAL DAILY
Qty: 90 TABLET | Refills: 3 | Status: SHIPPED | OUTPATIENT
Start: 2024-08-23

## 2024-08-27 ENCOUNTER — OFFICE VISIT (OUTPATIENT)
Dept: NEPHROLOGY | Facility: CLINIC | Age: 64
End: 2024-08-27
Payer: COMMERCIAL

## 2024-08-27 ENCOUNTER — TELEPHONE (OUTPATIENT)
Dept: NEPHROLOGY | Facility: CLINIC | Age: 64
End: 2024-08-27

## 2024-08-27 VITALS — HEIGHT: 64 IN | BODY MASS INDEX: 49.78 KG/M2

## 2024-08-27 DIAGNOSIS — D63.1 ANEMIA IN STAGE 3B CHRONIC KIDNEY DISEASE  (HCC): ICD-10-CM

## 2024-08-27 DIAGNOSIS — N18.32 STAGE 3B CHRONIC KIDNEY DISEASE (HCC): ICD-10-CM

## 2024-08-27 DIAGNOSIS — N25.81 SECONDARY HYPERPARATHYROIDISM OF RENAL ORIGIN (HCC): ICD-10-CM

## 2024-08-27 DIAGNOSIS — N18.32 ANEMIA IN STAGE 3B CHRONIC KIDNEY DISEASE  (HCC): ICD-10-CM

## 2024-08-27 DIAGNOSIS — I12.9 HYPERTENSIVE CHRONIC KIDNEY DISEASE WITH STAGE 1 THROUGH STAGE 4 CHRONIC KIDNEY DISEASE, OR UNSPECIFIED CHRONIC KIDNEY DISEASE: Primary | ICD-10-CM

## 2024-08-27 DIAGNOSIS — E61.1 IRON DEFICIENCY: ICD-10-CM

## 2024-08-27 DIAGNOSIS — E78.5 DYSLIPIDEMIA: ICD-10-CM

## 2024-08-27 DIAGNOSIS — E11.21 DIABETIC NEPHROPATHY ASSOCIATED WITH TYPE 2 DIABETES MELLITUS (HCC): ICD-10-CM

## 2024-08-27 DIAGNOSIS — R80.1 PERSISTENT PROTEINURIA: ICD-10-CM

## 2024-08-27 PROCEDURE — 99214 OFFICE O/P EST MOD 30 MIN: CPT | Performed by: INTERNAL MEDICINE

## 2024-08-27 PROCEDURE — G2211 COMPLEX E/M VISIT ADD ON: HCPCS | Performed by: INTERNAL MEDICINE

## 2024-08-27 NOTE — LETTER
August 27, 2024     TULIO Finney  [address]  [address]  [facility] [address]    Patient: Adelina Soto   YOB: 1960   Date of Visit: 8/27/2024       Dear Dr. Sam:    Thank you for referring Adelina Soto to me for evaluation. Below are my notes for this consultation.    If you have questions, please do not hesitate to call me. I look forward to following your patient along with you.         Sincerely,        Delmar Winchester MD        CC: No Recipients    Delmar Winchester MD  8/27/2024  2:34 PM  Sign when Signing Visit  RENAL FOLLOW UP NOTE:.td    ASSESSMENT AND PLAN:  1.  CKD stage 3.:  Etiology:  Right radical nephrectomy July 15, 2016 for renal cell CA/hypertensive nephrosclerosis/arteriolar nephrosclerosis/diabetic nephropathy/?  Morbid obesity with?  FSGS  Baseline creatinine:  new baseline appears to be from 1.8-as high as 2.4  Current creatinine:  1.85 at baseline from 7/31/2024  Urine protein creatinine ratio: 0.435 g at goal  UA microscopic no proteinuria, trace intact heme from 05/13/2020  Recommendations:  Treat hypertension-please see below  Treat dyslipidemia-please see below  Maintain proteinuria less than 1 g or as low as possible  Avoid nephrotoxic agents such as NSAIDs, patient counseled as such  Kidney smart referral this time     2.  Volume:  Current volume:  Euvolemic  Treatment:  Euvolemic currently off diuretics completely     3.  Hypertension:   Home blood pressures:    None today       Goal blood pressure:  Less than 130/80  Recommendations:    push nonmedical regimen including weight loss, and any form of exercise patient can not tolerate; avoidance of salt; patient counseled as such  Medication changes today:  Seems doing well.  Only medication is currently cardiac meds including furosemide and metoprolol with the atrial fibrillation.  She seems to be tolerating this regimen     4.  Electrolytes:  Chronic metabolic alkalosis most likely from primary CO2  retention, stable at 30; mildhyperkalemia doing well at 4.5     5.  Mineral bone disorder:  Secondary to CKD:  Calcium/magnesium/phosphorus:  All acceptable  PTH intact: 120 above goal will monitor and not overtreat  Vitamin-D:  47 at goal     6.  Dyslipidemia:  Goal LDL:  Less than 100  Current lipid profile: LDL 74/HDL 30/glycerides 168 from 7/31/2024  Recommendations: No changes as patient is at goal     7.  Anemia:    -Current value 11.8 essentially normal  -iron studies: Low ferritin at 73 from 7/31/2024 replete every other day  -B12 and folate deficiency being treated  -multiple myeloma was ruled out in January  -GI evaluation in January of 2018:   colonoscopy demonstrated internal/external hemorrhoids; also colonic polyps which were removed.  There was a suboptimal colonic prep.  Felt she may require repeat colonoscopy in 3-6 months. EGD demonstrated small hiatal hernia.   GI evaluated the patient December 2022 because of anemia EGD demonstrated gastric duodenal AVMs requiring APC, follow-up secondary endoscopy because of dropping hemoglobin; colonoscopy demonstrated no active bleeding  Capsule endoscopy was recommended never done           8.  Other problems:  Right radical nephrectomy for renal cell CA July 15, 2016  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 2, 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  Morbid obesity  Atrial fibrillation  Cellulitis of the left leg back in January treated with intravenous antibiotics  COPD on iron  EGD involving multiple joints  Hypothyroidism on supplement  History of gait dysfunction using motorized scooter to ambulate  Diabetes mellitus/diabetic neuropathy  Admission 8/2021 for abdominal pain-workup is CT/EGD/colonoscopy/push enteroscopy all negative except for gastritis-esophagitis; the patient's symptoms resolved spontaneously.  Patient with a ventral hernia which she deferred in terms of surgical revision, will follow-up with surgery.  Admission to [facility] 12/11/2022 with abdominal wall cellulitis/anemia status posttransfusion  Colonoscopy 11/30/2022 demonstrated diverticula in the sigmoid colon otherwise normal colonoscopy  EGD with push enteroscopy was prematurely aborted secondary respiratory instability  Completed course of antibiotics including Keflex  Peak creatinine 2.19 on admission came down to 1.85  Admission December 3, 2022 with acute hypoxic hypercapnic respiratory failure: Felt to have multifocal pneumonia started on empiric antibiotics treated by pulmonary.  Also GI evaluation for anemia please see above            GI health maintenance: 12/2/2022: Normal colonoscopy recommended repeat in 5 years which would be December 2027.         PATIENT INSTRUCTIONS:    Patient Instructions   Visit summary:  - Overall kidney labs look great the rest your labs are doing very well including your cholesterol  - Your blood pressure is also doing well    If you run into problems with leg swelling or fluid retention please contact me    1. Medication changes today:  No medication changes today    2.  General instructions:  Continue to be as active as possible continue to try to lose weight if you can      3.  Please take 1 week a blood pressure readings prior to your next appointment    AS FOLLOWS  MORNING AND EVENING, SITTING AND STANDING as follows:  TAKE THE MORNING READINGS BEFORE ANY MEDICATIONS AND WHEN YOU ARE RELAXED FOR SEVERAL MINUTES  TAKE THE EVENING READINGS:  BETWEEN 7-10 P.M.; PRIOR TO ANY MEDICATIONS; AT LEAST IN OUR  FROM DINNER; AND CERTAINLY AFTER RELAXING FOR A FEW MINUTES  PLEASE INCLUDE HEART RATE WITH YOUR BLOOD PRESSURE READINGS  When taking standing readings, keep your arm supported at heart level and not dangling  Make sure you are sitting with your back supported and feet on the ground and do not cross your legs or feet  Make sure you have not taken any coffee or caffeine products or exercised or smoke cigarettes at least 30  minutes before taking your blood pressure  Then please mail these readings into the office      4.  In 3 months:  Please go for nonfasting lab work but in the morning      5.  Follow-up in 6 months  Please bring in 1 week a blood pressure readings morning evening, sitting and standing is outlined above  PLEASE BRING AN YOUR BLOOD PRESSURE MACHINE TO CORRELATE WITH THE OFFICE MACHINE AT THIS NEXT SCHEDULED VISIT  Please go for fasting lab work 1-2 weeks prior to your appointment      6.  General non medical recommendations:  AVOID SALT BUT NOT ADDING AN READING LABELS TO MAKE SURE THERE IS LOW-SALT IN THE FOOD THAT YOU ARE EATING  Goal is less than 2 g of sodium intake or less than 5 g of sodium chloride intake per day    Avoid nonsteroidal anti-inflammatory drugs such as Naprosyn, ibuprofen, Aleve, Advil, Celebrex, Meloxicam (Mobic) etc.  You can use Tylenol as needed if you do not have any liver condition to be concerned about    Avoid medications such as Sudafed or decongestants and antihistamines that contained the D component which is the decongestant.  You can take antihistamines without the decongestant or D component.    Try to avoid medications such as pantoprazole or  Protonix/Nexium or Esomeprazole)/Prilosec or omeprazole/Prevacid or lansoprazole/AcipHex or Rabeprazole.  If you are able to, use Pepcid as this is safer for your kidneys.    Try to exercise at least 30 minutes 3 days a week to begin with with an ultimate goal of 5 days a week for at least 30 minutes    Please do not drink more than 2 glasses of alcohol/wine on a daily basis as this may contribute to your high blood pressure.            Subjective:   There has been no hospitalizations or acute illnesses since last visit.  The patient overall is feeling well.  No fevers, chills, or cough or colds.  Good appetite and good energy  No hematuria, dysuria, voiding symptoms or foamy urine  No gastrointestinal symptoms  No chest pain, usual dyspnea on  exertion, no edema  No headaches, dizziness or lightheadedness  Blood pressure medications:  Metoprolol tartrate 50 mg twice a day    Renal pertinent medications:  Pantoprazole 40 mg daily/Pepcid 20 mg daily  Atorvastatin 10 mg daily  Iron every other day  Vitamin D 400 units daily    ROS:  See HPI, otherwise review of systems as completely reviewed with the patient are negative    Past Medical History:   Diagnosis Date   • Anemia    • Arthritis    • Cancer of kidney (HCC)    • Chronic kidney disease    • Diabetes mellitus (HCC)    • Disease of thyroid gland    • HLD (hyperlipidemia)    • Hypertension    • Ovarian cancer (HCC)    • Pneumonia      Past Surgical History:   Procedure Laterality Date   • CHOLECYSTECTOMY     • CT GUIDED PERC DRAINAGE CATHETER PLACEMENT  5/16/2016   • GALLBLADDER SURGERY  05/01/2004   • HYSTERECTOMY  06/01/2018   • NEPHRECTOMY Right 08/02/2018     Family History   Problem Relation Age of Onset   • No Known Problems Mother    • No Known Problems Father       reports that she quit smoking about 13 years ago. Her smoking use included cigarettes. She started smoking about 43 years ago. She has a 90 pack-year smoking history. She quit smokeless tobacco use about 12 years ago. She reports current drug use. Drug: Marijuana. She reports that she does not drink alcohol.    I COMPLETELY REVIEWED THE PAST MEDICAL HISTORY/PAST SURGICAL HISTORY/SOCIAL HISTORY/FAMILY HISTORY/AND MEDICATIONS  AND UPDATED ALL    Objective:     Vitals:   BP sitting on left: 114/68 with a heart rate of 68 and irregular    Weight (last 2 days)       Date/Time Weight    08/27/24 1414 --     Weight: w/c at 08/27/24 1414          Wt Readings from Last 3 Encounters:   08/30/23 132 kg (290 lb)   03/14/23 132 kg (290 lb 6.4 oz)   02/16/23 (!) 153 kg (338 lb)       Body mass index is 49.78 kg/m².    Physical Exam: General:  No acute distress/morbidly obese/in wheelchair  Skin:  No acute rash  Eyes:  No scleral icterus,  noninjected, no discharge from eyes  ENT:  Moist mucous membranes  Neck:  Supple, no jugular venous distention, trachea is midline, no lymphadenopathy and no thyromegaly  Back   No CVAT  Chest:  Clear to auscultation and percussion, good respiratory effort  CVS: Irregular rhythm without a rub, or gallops or murmurs  Abdomen:  obese, Soft and nontender with normal bowel sounds  Extremities:  No cyanosis and no edema, no arthritic changes, normal range of motion/venous stasis changes  Neuro:  Grossly intact  Psych:  Alert, oriented x3 and appropriate      Medications:    Current Outpatient Medications:   •  albuterol (2.5 mg/3 mL) 0.083 % nebulizer solution, USE 1 VIAL IN NEBULIZER EVERY 6 HOURS - as needed, Disp: 125 mL, Rfl: 11  •  albuterol (PROVENTIL HFA,VENTOLIN HFA) 90 mcg/act inhaler, Inhale 1 puff every 4 (four) hours as needed for wheezing, Disp: 6.7 g, Rfl: 1  •  atorvastatin (LIPITOR) 10 mg tablet, Take 1 tablet (10 mg total) by mouth daily, Disp: 90 tablet, Rfl: 3  •  Blood Glucose Monitoring Suppl ([employer] Pro Glucose Meter) SAVANNAH, Use 2 (two) times a day Embrace glucometer, test twice daily, Disp: 1 each, Rfl: 0  •  cholecalciferol (VITAMIN D3) 400 units tablet, Take 1 tablet (400 Units total) by mouth daily, Disp: 100 tablet, Rfl: 1  •  famotidine (PEPCID) 20 mg tablet, Take 1 tablet (20 mg total) by mouth daily, Disp: 90 tablet, Rfl: 3  •  ferrous sulfate 325 (65 Fe) mg tablet, Take 1 tablet (325 mg total) by mouth daily with breakfast Every other day, Disp: 45 tablet, Rfl: 1  •  furosemide (LASIX) 40 mg tablet, Take 1 tablet (40 mg total) by mouth daily (Patient taking differently: Take 40 mg by mouth daily And and as needed dose), Disp: 90 tablet, Rfl: 3  •  ipratropium (ATROVENT) 0.02 % nebulizer solution, USE 1 VIAL IN NEBULIZER 4 TIMES DAILY, Disp: 125 mL, Rfl: 11  •  ipratropium-albuterol (DUO-NEB) 0.5-2.5 mg/3 mL nebulizer solution, Take 3 mL by nebulization every 8 (eight) hours as needed for  wheezing or shortness of breath, Disp: 60 mL, Rfl: 3  •  levothyroxine 100 mcg tablet, Take 1 tablet (100 mcg total) by mouth daily, Disp: 90 tablet, Rfl: 3  •  LORazepam (ATIVAN) 0.5 mg tablet, Take 1 tablet (0.5 mg total) by mouth 2 (two) times a day as needed for anxiety, Disp: 60 tablet, Rfl: 2  •  metoprolol tartrate (LOPRESSOR) 50 mg tablet, Take 1 tablet (50 mg total) by mouth 2 (two) times a day, Disp: 180 tablet, Rfl: 3  •  nystatin (MYCOSTATIN) cream, Apply topically 2 (two) times a day, Disp: 30 g, Rfl: 3  •  pantoprazole (PROTONIX) 40 mg tablet, Take 1 tablet (40 mg total) by mouth daily, Disp: 90 tablet, Rfl: 3  •  sertraline (ZOLOFT) 50 mg tablet, Take 1 tablet (50 mg total) by mouth daily, Disp: 90 tablet, Rfl: 3  •  triamcinolone (KENALOG) 0.1 % ointment, Apply topically 2 (two) times a day, Disp: 160 g, Rfl: 4  •  warfarin (Coumadin) 1 mg tablet, Take 1 tablet (1 mg total) by mouth daily (Patient taking differently: Take 1 mg by mouth daily Monday,Wednesday, Friday 3 mg Sunday, Saturday, Tuesday, Thursday 1 mg), Disp: 90 tablet, Rfl: 2  •  warfarin (COUMADIN) 3 mg tablet, Take 1 tablet (3 mg total) by mouth daily, Disp: 90 tablet, Rfl: 3  •  amLODIPine (NORVASC) 5 mg tablet, take 1 tablet by mouth once daily (Patient not taking: Reported on 2/29/2024), Disp: 90 tablet, Rfl: 2  •  collagenase (SANTYL) ointment, Apply topically daily (Patient not taking: Reported on 5/26/2023), Disp: 15 g, Rfl: 1  •  Embrace Lancets Ultra Thin 30G MISC, Use 2 (two) times a day (Patient not taking: Reported on 7/19/2023), Disp: 100 each, Rfl: 5  •  glucose blood (Embrace Blood Glucose Test) test strip, Use as instructed to check sugar bid (Patient not taking: Reported on 5/26/2023), Disp: 100 strip, Rfl: 4  •  hydrOXYzine HCL (ATARAX) 50 mg tablet, Take 1 tablet (50 mg total) by mouth daily at bedtime (Patient not taking: Reported on 8/27/2024), Disp: 90 tablet, Rfl: 3    Lab, Imaging and other studies: I have  "personally reviewed pertinent labs.  Laboratory Results:  Results for orders placed or performed in visit on 04/25/24   Semi Electric Bed Package   Result Value Ref Range    Supplier Name [employer]     Supplier Phone Number [phone]     Order Status Cannot Dispense     Delivery Note      Delivery Request Date 04/25/2024     Item Description Semi-Electric Hospital Bed, Gel Overlay     Item Description Standard Mattress     Item Description Gel Overlay     Item Description Half Bed Rails    TSH, 3rd generation with Free T4 reflex   Result Value Ref Range    TSH W/RFX TO FREE T4 3.02 0.40 - 4.50 mIU/L   Protime-INR   Result Value Ref Range    INR 2.7 (H)     Prothrombin Time 26.2 (H) 9.0 - 11.5 sec   Lipid panel   Result Value Ref Range    Total Cholesterol 140 <200 mg/dL    HDL 29 (L) > OR = 50 mg/dL    Triglycerides 203 (H) <150 mg/dL    LDL Calculated 82 mg/dL (calc)    Chol HDLC Ratio 4.8 <5.0 (calc)    Non-HDL Cholesterol 111 <130 mg/dL (calc)   Hemoglobin A1c (w/out EAG)   Result Value Ref Range    Hemoglobin A1C 6.0 (H) <5.7 % of total Hgb             Invalid input(s): \"ALBUMIN\"      Radiology review:   chest X-ray    Ultrasound      Portions of the record may have been created with voice recognition software.  Occasional wrong word or \"sound a like\" substitutions may have occurred due to the inherent limitations of voice recognition software.  Read the chart carefully and recognize, using context, where substitutions have occurred.                    "

## 2024-08-27 NOTE — PATIENT INSTRUCTIONS
Visit summary:  - Overall kidney labs look great the rest your labs are doing very well including your cholesterol  - Your blood pressure is also doing well    If you run into problems with leg swelling or fluid retention please contact me    1. Medication changes today:  No medication changes today    2.  General instructions:  Continue to be as active as possible continue to try to lose weight if you can      3.  Please take 1 week a blood pressure readings prior to your next appointment    AS FOLLOWS  MORNING AND EVENING, SITTING AND STANDING as follows:  TAKE THE MORNING READINGS BEFORE ANY MEDICATIONS AND WHEN YOU ARE RELAXED FOR SEVERAL MINUTES  TAKE THE EVENING READINGS:  BETWEEN 7-10 P.M.; PRIOR TO ANY MEDICATIONS; AT LEAST IN OUR  FROM DINNER; AND CERTAINLY AFTER RELAXING FOR A FEW MINUTES  PLEASE INCLUDE HEART RATE WITH YOUR BLOOD PRESSURE READINGS  When taking standing readings, keep your arm supported at heart level and not dangling  Make sure you are sitting with your back supported and feet on the ground and do not cross your legs or feet  Make sure you have not taken any coffee or caffeine products or exercised or smoke cigarettes at least 30 minutes before taking your blood pressure  Then please mail these readings into the office      4.  In 3 months:  Please go for nonfasting lab work but in the morning      5.  Follow-up in 6 months  Please bring in 1 week a blood pressure readings morning evening, sitting and standing is outlined above  PLEASE BRING AN YOUR BLOOD PRESSURE MACHINE TO CORRELATE WITH THE OFFICE MACHINE AT THIS NEXT SCHEDULED VISIT  Please go for fasting lab work 1-2 weeks prior to your appointment      6.  General non medical recommendations:  AVOID SALT BUT NOT ADDING AN READING LABELS TO MAKE SURE THERE IS LOW-SALT IN THE FOOD THAT YOU ARE EATING  Goal is less than 2 g of sodium intake or less than 5 g of sodium chloride intake per day    Avoid nonsteroidal  anti-inflammatory drugs such as Naprosyn, ibuprofen, Aleve, Advil, Celebrex, Meloxicam (Mobic) etc.  You can use Tylenol as needed if you do not have any liver condition to be concerned about    Avoid medications such as Sudafed or decongestants and antihistamines that contained the D component which is the decongestant.  You can take antihistamines without the decongestant or D component.    Try to avoid medications such as pantoprazole or  Protonix/Nexium or Esomeprazole)/Prilosec or omeprazole/Prevacid or lansoprazole/AcipHex or Rabeprazole.  If you are able to, use Pepcid as this is safer for your kidneys.    Try to exercise at least 30 minutes 3 days a week to begin with with an ultimate goal of 5 days a week for at least 30 minutes    Please do not drink more than 2 glasses of alcohol/wine on a daily basis as this may contribute to your high blood pressure.

## 2024-09-03 DIAGNOSIS — J44.9 CHRONIC OBSTRUCTIVE PULMONARY DISEASE, UNSPECIFIED COPD TYPE (HCC): ICD-10-CM

## 2024-09-04 RX ORDER — ALBUTEROL SULFATE 0.83 MG/ML
2.5 SOLUTION RESPIRATORY (INHALATION) EVERY 6 HOURS PRN
Qty: 125 ML | Refills: 0 | Status: SHIPPED | OUTPATIENT
Start: 2024-09-04

## 2024-09-13 DIAGNOSIS — J96.12 CHRONIC RESPIRATORY FAILURE WITH HYPERCAPNIA (HCC): ICD-10-CM

## 2024-09-13 RX ORDER — ALBUTEROL SULFATE 90 UG/1
1 AEROSOL, METERED RESPIRATORY (INHALATION) EVERY 4 HOURS PRN
Qty: 6.7 G | Refills: 5 | Status: SHIPPED | OUTPATIENT
Start: 2024-09-13

## 2024-09-13 NOTE — TELEPHONE ENCOUNTER
Reason for call:   [x] Refill   [] Prior Auth  [] Other:     Office:   [x] PCP/Provider -   [] Specialty/Provider -     Medication: Albuterol 90 mcg , inhale 1 puff every 4 hours as needed       Pharmacy: [facility]     Does the patient have enough for 3 days?   [x] Yes   [] No - Send as HP to POD

## 2024-09-20 DIAGNOSIS — F41.0 PANIC ATTACK: ICD-10-CM

## 2024-09-20 RX ORDER — LORAZEPAM 0.5 MG/1
0.5 TABLET ORAL 2 TIMES DAILY PRN
Qty: 60 TABLET | Refills: 0 | Status: SHIPPED | OUTPATIENT
Start: 2024-09-20

## 2024-09-20 NOTE — TELEPHONE ENCOUNTER
Reason for call:   [x] Refill   [] Prior Auth  [] Other:     Office:   [x] PCP/Provider -   [] Specialty/Provider -     Medication: Lorazepam    Dose/Frequency: .5mg 1tab 2x daily as needed    Quantity: 60 tablets     Pharmacy: [facility]    Does the patient have enough for 3 days?   [x] Yes   [] No - Send as HP to POD

## 2024-09-25 NOTE — TELEPHONE ENCOUNTER
Patient called requesting refill for Lorazepam 0.5 mg. Patient made aware medication was refilled on 9/20/24 for 60 with 0 refills to [facility] pharmacy. Patient instructed to contact the pharmacy to obtain refills of medication. Patient verbalized understanding.

## 2024-10-01 ENCOUNTER — TELEPHONE (OUTPATIENT)
Dept: NEPHROLOGY | Facility: CLINIC | Age: 64
End: 2024-10-01

## 2024-10-01 LAB
ALBUMIN SERPL-MCNC: 3.2 G/DL (ref 3.6–5.1)
ALBUMIN/GLOB SERPL: 0.8 (CALC) (ref 1–2.5)
ALP SERPL-CCNC: 145 U/L (ref 37–153)
ALT SERPL-CCNC: 6 U/L (ref 6–29)
AST SERPL-CCNC: 11 U/L (ref 10–35)
BILIRUB SERPL-MCNC: 0.3 MG/DL (ref 0.2–1.2)
BUN SERPL-MCNC: 31 MG/DL (ref 7–25)
BUN/CREAT SERPL: 16 (CALC) (ref 6–22)
CALCIUM SERPL-MCNC: 8.9 MG/DL (ref 8.6–10.4)
CHLORIDE SERPL-SCNC: 102 MMOL/L (ref 98–110)
CO2 SERPL-SCNC: 34 MMOL/L (ref 20–32)
CREAT SERPL-MCNC: 1.95 MG/DL (ref 0.5–1.05)
CREAT UR-MCNC: 51 MG/DL (ref 20–275)
ERYTHROCYTE [DISTWIDTH] IN BLOOD BY AUTOMATED COUNT: 14 % (ref 11–15)
FERRITIN SERPL-MCNC: 78 NG/ML (ref 16–288)
GFR/BSA.PRED SERPLBLD CYS-BASED-ARV: 28 ML/MIN/1.73M2
GLOBULIN SER CALC-MCNC: 3.8 G/DL (CALC) (ref 1.9–3.7)
GLUCOSE SERPL-MCNC: 91 MG/DL (ref 65–99)
HCT VFR BLD AUTO: 36.3 % (ref 35–45)
HGB BLD-MCNC: 11.6 G/DL (ref 11.7–15.5)
IRON SATN MFR SERPL: 21 % (CALC) (ref 16–45)
IRON SERPL-MCNC: 53 MCG/DL (ref 45–160)
MAGNESIUM SERPL-MCNC: 1.9 MG/DL (ref 1.5–2.5)
MCH RBC QN AUTO: 30.9 PG (ref 27–33)
MCHC RBC AUTO-ENTMCNC: 32 G/DL (ref 32–36)
MCV RBC AUTO: 96.8 FL (ref 80–100)
PHOSPHATE SERPL-MCNC: 3.7 MG/DL (ref 2.1–4.3)
PLATELET # BLD AUTO: 289 THOUSAND/UL (ref 140–400)
PMV BLD REES-ECKER: 9.8 FL (ref 7.5–12.5)
POTASSIUM SERPL-SCNC: 4.5 MMOL/L (ref 3.5–5.3)
PROT SERPL-MCNC: 7 G/DL (ref 6.1–8.1)
PROT UR-MCNC: 25 MG/DL (ref 5–24)
PROT/CREAT UR: 0.49 MG/MG CREAT (ref 0.02–0.18)
PROT/CREAT UR: 490 MG/G CREAT (ref 24–184)
PTH-INTACT SERPL-MCNC: 117 PG/ML (ref 16–77)
PTH-INTACT SERPL-MCNC: 121 PG/ML (ref 16–77)
RBC # BLD AUTO: 3.75 MILLION/UL (ref 3.8–5.1)
SODIUM SERPL-SCNC: 140 MMOL/L (ref 135–146)
SPECIMEN SOURCE: NORMAL
TIBC SERPL-MCNC: 258 MCG/DL (CALC) (ref 250–450)
WBC # BLD AUTO: 6.7 THOUSAND/UL (ref 3.8–10.8)

## 2024-10-01 NOTE — TELEPHONE ENCOUNTER
Spoke with patient's caregiver, Constanza, about the following, she expressed understanding and thanked us for the call:    ----- Message from Delmar Winchester MD sent at 10/1/2024  8:40 AM EDT -----  Labs look good!  ----- Message -----  From: [employer] Lab Results In  Sent: 10/1/2024   7:15 AM EDT  To: Delmar Winchester MD

## 2024-10-07 DIAGNOSIS — L30.1 DYSHIDROTIC ECZEMA: ICD-10-CM

## 2024-10-08 RX ORDER — TRIAMCINOLONE ACETONIDE 1 MG/G
1 OINTMENT TOPICAL 2 TIMES DAILY
Qty: 160 G | Refills: 3 | Status: SHIPPED | OUTPATIENT
Start: 2024-10-08

## 2024-11-04 ENCOUNTER — OFFICE VISIT (OUTPATIENT)
Dept: FAMILY MEDICINE CLINIC | Facility: CLINIC | Age: 64
End: 2024-11-04
Payer: COMMERCIAL

## 2024-11-04 VITALS
BODY MASS INDEX: 42 KG/M2 | RESPIRATION RATE: 17 BRPM | OXYGEN SATURATION: 96 % | WEIGHT: 246 LBS | HEART RATE: 98 BPM | TEMPERATURE: 98.3 F | DIASTOLIC BLOOD PRESSURE: 70 MMHG | HEIGHT: 64 IN | SYSTOLIC BLOOD PRESSURE: 128 MMHG

## 2024-11-04 DIAGNOSIS — Z23 ENCOUNTER FOR IMMUNIZATION: ICD-10-CM

## 2024-11-04 DIAGNOSIS — Z79.4 TYPE 2 DIABETES MELLITUS WITHOUT COMPLICATION, WITH LONG-TERM CURRENT USE OF INSULIN (HCC): ICD-10-CM

## 2024-11-04 DIAGNOSIS — Z00.00 MEDICARE ANNUAL WELLNESS VISIT, SUBSEQUENT: Primary | ICD-10-CM

## 2024-11-04 DIAGNOSIS — Z79.01 CHRONIC ANTICOAGULATION: ICD-10-CM

## 2024-11-04 DIAGNOSIS — F41.0 PANIC ATTACK: ICD-10-CM

## 2024-11-04 DIAGNOSIS — D68.9 COAGULOPATHY (HCC): ICD-10-CM

## 2024-11-04 DIAGNOSIS — I48.0 PAROXYSMAL ATRIAL FIBRILLATION (HCC): ICD-10-CM

## 2024-11-04 DIAGNOSIS — E11.9 TYPE 2 DIABETES MELLITUS WITHOUT COMPLICATION, WITH LONG-TERM CURRENT USE OF INSULIN (HCC): ICD-10-CM

## 2024-11-04 LAB — SL AMB POCT HEMOGLOBIN AIC: 5.5 (ref ?–6.5)

## 2024-11-04 PROCEDURE — 90662 IIV NO PRSV INCREASED AG IM: CPT | Performed by: NURSE PRACTITIONER

## 2024-11-04 PROCEDURE — G0439 PPPS, SUBSEQ VISIT: HCPCS | Performed by: NURSE PRACTITIONER

## 2024-11-04 PROCEDURE — G0008 ADMIN INFLUENZA VIRUS VAC: HCPCS | Performed by: NURSE PRACTITIONER

## 2024-11-04 PROCEDURE — 83036 HEMOGLOBIN GLYCOSYLATED A1C: CPT | Performed by: NURSE PRACTITIONER

## 2024-11-04 RX ORDER — LORAZEPAM 0.5 MG/1
0.5 TABLET ORAL 2 TIMES DAILY PRN
Qty: 60 TABLET | Refills: 2 | Status: ON HOLD | OUTPATIENT
Start: 2024-11-04

## 2024-11-04 NOTE — PATIENT INSTRUCTIONS
Medicare Preventive Visit Patient Instructions  Thank you for completing your Welcome to Medicare Visit or Medicare Annual Wellness Visit today. Your next wellness visit will be due in one year (11/5/2025).  The screening/preventive services that you may require over the next 5-10 years are detailed below. Some tests may not apply to you based off risk factors and/or age. Screening tests ordered at today's visit but not completed yet may show as past due. Also, please note that scanned in results may not display below.  Preventive Screenings:  Service Recommendations Previous Testing/Comments   Colorectal Cancer Screening  * Colonoscopy    * Fecal Occult Blood Test (FOBT)/Fecal Immunochemical Test (FIT)  * Fecal DNA/Cologuard Test  * Flexible Sigmoidoscopy Age: 45-75 years old   Colonoscopy: every 10 years (may be performed more frequently if at higher risk)  OR  FOBT/FIT: every 1 year  OR  Cologuard: every 3 years  OR  Sigmoidoscopy: every 5 years  Screening may be recommended earlier than age 45 if at higher risk for colorectal cancer. Also, an individualized decision between you and your healthcare provider will decide whether screening between the ages of 76-85 would be appropriate. Colonoscopy: 12/02/2022  FOBT/FIT: 11/28/2022  Cologuard: Not on file  Sigmoidoscopy: Not on file    Screening Current     Breast Cancer Screening Age: 40+ years old  Frequency: every 1-2 years  Not required if history of left and right mastectomy Mammogram: Not on file        Cervical Cancer Screening Between the ages of 21-29, pap smear recommended once every 3 years.   Between the ages of 30-65, can perform pap smear with HPV co-testing every 5 years.   Recommendations may differ for women with a history of total hysterectomy, cervical cancer, or abnormal pap smears in past. Pap Smear: Not on file    Screening Not Indicated   Hepatitis C Screening Once for adults born between 1945 and 1965  More frequently in patients at high risk  for Hepatitis C Hep C Antibody: Not on file        Diabetes Screening 1-2 times per year if you're at risk for diabetes or have pre-diabetes Fasting glucose: 124 mg/dL (8/8/2021)  A1C: 6.0 % of total Hgb (4/29/2024)  Screening Not Indicated  History Diabetes   Cholesterol Screening Once every 5 years if you don't have a lipid disorder. May order more often based on risk factors. Lipid panel: 07/31/2024    Screening Current     Other Preventive Screenings Covered by Medicare:  Abdominal Aortic Aneurysm (AAA) Screening: covered once if your at risk. You're considered to be at risk if you have a family history of AAA.  Lung Cancer Screening: covers low dose CT scan once per year if you meet all of the following conditions: (1) Age 55-77; (2) No signs or symptoms of lung cancer; (3) Current smoker or have quit smoking within the last 15 years; (4) You have a tobacco smoking history of at least 20 pack years (packs per day multiplied by number of years you smoked); (5) You get a written order from a healthcare provider.  Glaucoma Screening: covered annually if you're considered high risk: (1) You have diabetes OR (2) Family history of glaucoma OR (3)  aged 50 and older OR (4)  American aged 65 and older  Osteoporosis Screening: covered every 2 years if you meet one of the following conditions: (1) You're estrogen deficient and at risk for osteoporosis based off medical history and other findings; (2) Have a vertebral abnormality; (3) On glucocorticoid therapy for more than 3 months; (4) Have primary hyperparathyroidism; (5) On osteoporosis medications and need to assess response to drug therapy.   Last bone density test (DXA Scan): Not on file.  HIV Screening: covered annually if you're between the age of 15-65. Also covered annually if you are younger than 15 and older than 65 with risk factors for HIV infection. For pregnant patients, it is covered up to 3 times per  pregnancy.    Immunizations:  Immunization Recommendations   Influenza Vaccine Annual influenza vaccination during flu season is recommended for all persons aged >= 6 months who do not have contraindications   Pneumococcal Vaccine   * Pneumococcal conjugate vaccine = PCV13 (Prevnar 13), PCV15 (Vaxneuvance), PCV20 (Prevnar 20)  * Pneumococcal polysaccharide vaccine = PPSV23 (Pneumovax) Adults 19-65 yo with certain risk factors or if 65+ yo  If never received any pneumonia vaccine: recommend Prevnar 20 (PCV20)  Give PCV20 if previously received 1 dose of PCV13 or PPSV23   Hepatitis B Vaccine 3 dose series if at intermediate or high risk (ex: diabetes, end stage renal disease, liver disease)   Respiratory syncytial virus (RSV) Vaccine - COVERED BY MEDICARE PART D  * RSVPreF3 (Arexvy) CDC recommends that adults 60 years of age and older may receive a single dose of RSV vaccine using shared clinical decision-making (SCDM)   Tetanus (Td) Vaccine - COST NOT COVERED BY MEDICARE PART B Following completion of primary series, a booster dose should be given every 10 years to maintain immunity against tetanus. Td may also be given as tetanus wound prophylaxis.   Tdap Vaccine - COST NOT COVERED BY MEDICARE PART B Recommended at least once for all adults. For pregnant patients, recommended with each pregnancy.   Shingles Vaccine (Shingrix) - COST NOT COVERED BY MEDICARE PART B  2 shot series recommended in those 19 years and older who have or will have weakened immune systems or those 50 years and older     Health Maintenance Due:      Topic Date Due   • Hepatitis C Screening  Never done   • Breast Cancer Screening: Mammogram  Never done   • Lung Cancer Screening  12/11/2023   • HIV Screening  04/25/2026 (Originally 2/18/1975)   • Colorectal Cancer Screening  12/01/2027     Immunizations Due:      Topic Date Due   • Influenza Vaccine (1) 09/01/2024   • COVID-19 Vaccine (3 - 2023-24 season) 09/01/2024     Advance Directives   What  are advance directives?  Advance directives are legal documents that state your wishes and plans for medical care. These plans are made ahead of time in case you lose your ability to make decisions for yourself. Advance directives can apply to any medical decision, such as the treatments you want, and if you want to donate organs.   What are the types of advance directives?  There are many types of advance directives, and each state has rules about how to use them. You may choose a combination of any of the following:  Living will:  This is a written record of the treatment you want. You can also choose which treatments you do not want, which to limit, and which to stop at a certain time. This includes surgery, medicine, IV fluid, and tube feedings.   Durable power of  for healthcare (DPAHC):  This is a written record that states who you want to make healthcare choices for you when you are unable to make them for yourself. This person, called a proxy, is usually a family member or a friend. You may choose more than 1 proxy.  Do not resuscitate (DNR) order:  A DNR order is used in case your heart stops beating or you stop breathing. It is a request not to have certain forms of treatment, such as CPR. A DNR order may be included in other types of advance directives.  Medical directive:  This covers the care that you want if you are in a coma, near death, or unable to make decisions for yourself. You can list the treatments you want for each condition. Treatment may include pain medicine, surgery, blood transfusions, dialysis, IV or tube feedings, and a ventilator (breathing machine).  Values history:  This document has questions about your views, beliefs, and how you feel and think about life. This information can help others choose the care that you would choose.  Why are advance directives important?  An advance directive helps you control your care. Although spoken wishes may be used, it is better to have  your wishes written down. Spoken wishes can be misunderstood, or not followed. Treatments may be given even if you do not want them. An advance directive may make it easier for your family to make difficult choices about your care.   Weight Management   Why it is important to manage your weight:  Being overweight increases your risk of health conditions such as heart disease, high blood pressure, type 2 diabetes, and certain types of cancer. It can also increase your risk for osteoarthritis, sleep apnea, and other respiratory problems. Aim for a slow, steady weight loss. Even a small amount of weight loss can lower your risk of health problems.  How to lose weight safely:  A safe and healthy way to lose weight is to eat fewer calories and get regular exercise. You can lose up about 1 pound a week by decreasing the number of calories you eat by 500 calories each day.   Healthy meal plan for weight management:  A healthy meal plan includes a variety of foods, contains fewer calories, and helps you stay healthy. A healthy meal plan includes the following:  Eat whole-grain foods more often.  A healthy meal plan should contain fiber. Fiber is the part of grains, fruits, and vegetables that is not broken down by your body. Whole-grain foods are healthy and provide extra fiber in your diet. Some examples of whole-grain foods are whole-wheat breads and pastas, oatmeal, brown rice, and bulgur.  Eat a variety of vegetables every day.  Include dark, leafy greens such as spinach, kale, bolivar greens, and mustard greens. Eat yellow and orange vegetables such as carrots, sweet potatoes, and winter squash.   Eat a variety of fruits every day.  Choose fresh or canned fruit (canned in its own juice or light syrup) instead of juice. Fruit juice has very little or no fiber.  Eat low-fat dairy foods.  Drink fat-free (skim) milk or 1% milk. Eat fat-free yogurt and low-fat cottage cheese. Try low-fat cheeses such as mozzarella and other  reduced-fat cheeses.  Choose meat and other protein foods that are low in fat.  Choose beans or other legumes such as split peas or lentils. Choose fish, skinless poultry (chicken or turkey), or lean cuts of red meat (beef or pork). Before you cook meat or poultry, cut off any visible fat.   Use less fat and oil.  Try baking foods instead of frying them. Add less fat, such as margarine, sour cream, regular salad dressing and mayonnaise to foods. Eat fewer high-fat foods. Some examples of high-fat foods include french fries, doughnuts, ice cream, and cakes.  Eat fewer sweets.  Limit foods and drinks that are high in sugar. This includes candy, cookies, regular soda, and sweetened drinks.  Exercise:  Exercise at least 30 minutes per day on most days of the week. Some examples of exercise include walking, biking, dancing, and swimming. You can also fit in more physical activity by taking the stairs instead of the elevator or parking farther away from stores. Ask your healthcare provider about the best exercise plan for you.      © Copyright [employer] 2018 Information is for End User's use only and may not be sold, redistributed or otherwise used for commercial purposes. All illustrations and images included in CareNotes® are the copyrighted property of [employer].D.A[employer]M., Inc. or [employer]

## 2024-11-04 NOTE — PROGRESS NOTES
Ambulatory Visit  Name: Adelina Soto      : 1960      MRN: [id number]  Encounter Provider: TULIO Finney  Encounter Date: 2024   Encounter department: [facility] [place]    Assessment & Plan       Preventive health issues were discussed with patient, and age appropriate screening tests were ordered as noted in patient's After Visit Summary. Personalized health advice and appropriate referrals for health education or preventive services given if needed, as noted in patient's After Visit Summary.    History of Present Illness     HPI   Patient Care Team:  TULIO Finney as PCP - General (Family Medicine)  KHAI De Oliveira DO as PCP - PCP-[facility] ([facility])  Delmar Winchester MD    Review of Systems  Medical History Reviewed by provider this encounter:       Annual Wellness Visit Questionnaire   Adelina is here for her Subsequent Wellness visit. Last Medicare Wellness visit information reviewed, patient interviewed and updates made to the record today.      Health Risk Assessment:   Patient rates overall health as good. Patient feels that their physical health rating is slightly better. Patient is satisfied with their life. Eyesight was rated as same. Hearing was rated as same. Patient feels that their emotional and mental health rating is slightly better. Patients states they are sometimes angry. Patient states they are sometimes unusually tired/fatigued. Pain experienced in the last 7 days has been none. Patient states that she has experienced no weight loss or gain in last 6 months.     Depression Screening:   PHQ-9 Score: 1      Fall Risk Screening:   In the past year, patient has experienced: no history of falling in past year      Urinary Incontinence Screening:   Patient has not leaked urine accidently in the last six months.     Home Safety:  Patient has trouble with stairs inside or outside of their home. Patient has working smoke alarms and has working carbon  monoxide detector. Home safety hazards include: none.     Nutrition:   Current diet is Other (please comment). healthy    Medications:   Patient is not currently taking any over-the-counter supplements. Patient is able to manage medications.     Activities of Daily Living (ADLs)/Instrumental Activities of Daily Living (IADLs):   Walk and transfer into and out of bed and chair?: Yes  Dress and groom yourself?: Yes    Bathe or shower yourself?: No    Feed yourself? Yes  Do your laundry/housekeeping?: No  Manage your money, pay your bills and track your expenses?: Yes  Make your own meals?: Yes    Do your own shopping?: Yes    Previous Hospitalizations:   Any hospitalizations or ED visits within the last 12 months?: No      Advance Care Planning:   Living will: Yes    Durable POA for healthcare: Yes    Advanced directive: Yes    Advanced directive counseling given: Yes    End of Life Decisions reviewed with patient: Yes      Cognitive Screening:   Provider or family/friend/caregiver concerned regarding cognition?: No    PREVENTIVE SCREENINGS      Cardiovascular Screening:    General: Screening Current      Diabetes Screening:     General: History Diabetes and Screening Current      Colorectal Cancer Screening:     General: Screening Current      Breast Cancer Screening:     General: Risks and Benefits Discussed and Patient Declines    Due for: Mammogram        Cervical Cancer Screening:    General: Screening Not Indicated      Osteoporosis Screening:    General: Risks and Benefits Discussed and Patient Declines    Due for: DXA Axial      Abdominal Aortic Aneurysm (AAA) Screening:        General: Screening Not Indicated      Lung Cancer Screening:     General: Screening Not Indicated      Hepatitis C Screening:    General: Risks and Benefits Discussed    Hep C Screening Accepted: No       Preventive Screening Comments: Discussed indications of cancer screenings (mammogram) as well as consequences of missed screenings.  "Patient refuses mammogram at this time. Verbalizes risks of doing so (undiagnosed cancer, death, disability).  She also refuses DEXA scan/osteoporosis screening        Screening, Brief Intervention, and Referral to Treatment (SBIRT)    Screening  Typical number of drinks in a day: 0  Typical number of drinks in a week: 0  Interpretation: Low risk drinking behavior.    Single Item Drug Screening:  How often have you used an illegal drug (including marijuana) or a prescription medication for non-medical reasons in the past year? never    Single Item Drug Screen Score: 0  Interpretation: Negative screen for possible drug use disorder    Brief Intervention  Alcohol & drug use screenings were reviewed. No concerns regarding substance use disorder identified.     Other Counseling Topics:   Regular weightbearing exercise and calcium and vitamin D intake. Fall precautions    Social Determinants of Health     Financial Resource Strain: Medium Risk (5/26/2023)    Overall Financial Resource Strain (CARDIA)     Difficulty of Paying Living Expenses: Somewhat hard   Food Insecurity: No Food Insecurity (12/13/2022)    Hunger Vital Sign     Worried About Running Out of Food in the Last Year: Never true     Ran Out of Food in the Last Year: Never true   Transportation Needs: Unmet Transportation Needs (5/26/2023)    PRAPARE - Transportation     Lack of Transportation (Medical): Yes     Lack of Transportation (Non-Medical): Yes   Housing Stability: Low Risk  (12/13/2022)    Housing Stability Vital Sign     Unable to Pay for Housing in the Last Year: No     Number of Places Lived in the Last Year: 1     Unstable Housing in the Last Year: No     No results found.    Objective     /78 (BP Location: Left arm, Patient Position: Sitting, Cuff Size: Large)   Pulse 98   Temp 98.3 °F (36.8 °C) (Temporal)   Resp 17   Ht 5' 4\" (1.626 m)   Wt 112 kg (246 lb)   SpO2 96%   BMI 42.23 kg/m²     Physical Exam  Vitals and nursing note " reviewed.   Constitutional:       Appearance: Normal appearance.   Cardiovascular:      Rate and Rhythm: Normal rate. Rhythm irregular.      Pulses: Normal pulses. no weak pulses.           Dorsalis pedis pulses are 2+ on the right side and 2+ on the left side.        Posterior tibial pulses are 2+ on the right side and 2+ on the left side.   Pulmonary:      Effort: Pulmonary effort is normal.      Breath sounds: Normal breath sounds.   Feet:      Right foot:      Skin integrity: Dry skin present. No ulcer, skin breakdown, erythema, warmth or callus.      Left foot:      Skin integrity: Dry skin present. No ulcer, skin breakdown, erythema, warmth or callus.   Skin:     Coloration: Skin is not pale.   Neurological:      General: No focal deficit present.      Mental Status: She is alert. Mental status is at baseline.      Comments: Using electric wheelchair     Diabetic Foot Exam    Patient's shoes and socks removed.    Right Foot/Ankle   Right Foot Inspection  Skin Exam: skin normal, skin intact and dry skin. No warmth, no callus, no erythema, no maceration, no abnormal color, no pre-ulcer, no ulcer and no callus.     Toe Exam: ROM and strength within normal limits.     Sensory   Monofilament testing: diminished    Vascular  Capillary refills: < 3 seconds  The right DP pulse is 2+. The right PT pulse is 2+.     Left Foot/Ankle  Left Foot Inspection  Skin Exam: skin normal, skin intact and dry skin. No warmth, no erythema, no maceration, normal color, no pre-ulcer, no ulcer and no callus.     Toe Exam: ROM and strength within normal limits.     Sensory   Monofilament testing: diminished    Vascular  Capillary refills: < 3 seconds  The left DP pulse is 2+. The left PT pulse is 2+.     Assign Risk Category  No deformity present  Loss of protective sensation  No weak pulses  Risk: 1

## 2024-11-12 ENCOUNTER — TELEPHONE (OUTPATIENT)
Age: 64
End: 2024-11-12

## 2024-11-12 ENCOUNTER — APPOINTMENT (EMERGENCY)
Dept: RADIOLOGY | Facility: HOSPITAL | Age: 64
End: 2024-11-12
Payer: COMMERCIAL

## 2024-11-12 ENCOUNTER — APPOINTMENT (INPATIENT)
Dept: RADIOLOGY | Facility: HOSPITAL | Age: 64
DRG: 097 | End: 2024-11-12
Payer: COMMERCIAL

## 2024-11-12 ENCOUNTER — APPOINTMENT (EMERGENCY)
Dept: CT IMAGING | Facility: HOSPITAL | Age: 64
End: 2024-11-12
Payer: COMMERCIAL

## 2024-11-12 ENCOUNTER — APPOINTMENT (EMERGENCY)
Dept: MRI IMAGING | Facility: HOSPITAL | Age: 64
End: 2024-11-12
Payer: COMMERCIAL

## 2024-11-12 ENCOUNTER — HOSPITAL ENCOUNTER (INPATIENT)
Facility: HOSPITAL | Age: 64
LOS: 27 days | Discharge: HOME HEALTH | DRG: 097 | End: 2024-12-09
Attending: INTERNAL MEDICINE | Admitting: EMERGENCY MEDICINE
Payer: COMMERCIAL

## 2024-11-12 ENCOUNTER — HOSPITAL ENCOUNTER (EMERGENCY)
Facility: HOSPITAL | Age: 64
End: 2024-11-12
Attending: INTERNAL MEDICINE | Admitting: EMERGENCY MEDICINE
Payer: COMMERCIAL

## 2024-11-12 VITALS
HEART RATE: 124 BPM | WEIGHT: 245.81 LBS | OXYGEN SATURATION: 94 % | RESPIRATION RATE: 17 BRPM | TEMPERATURE: 100.2 F | SYSTOLIC BLOOD PRESSURE: 112 MMHG | BODY MASS INDEX: 42.19 KG/M2 | DIASTOLIC BLOOD PRESSURE: 52 MMHG

## 2024-11-12 DIAGNOSIS — R29.90 STROKE-LIKE SYMPTOM: Primary | ICD-10-CM

## 2024-11-12 DIAGNOSIS — G04.90 ENCEPHALITIS: ICD-10-CM

## 2024-11-12 DIAGNOSIS — R41.82 ALTERED MENTAL STATUS, UNSPECIFIED ALTERED MENTAL STATUS TYPE: ICD-10-CM

## 2024-11-12 DIAGNOSIS — E11.21 DIABETIC NEPHROPATHY ASSOCIATED WITH TYPE 2 DIABETES MELLITUS (HCC): ICD-10-CM

## 2024-11-12 DIAGNOSIS — R57.9 SHOCK (HCC): ICD-10-CM

## 2024-11-12 DIAGNOSIS — D68.9 COAGULOPATHY (HCC): ICD-10-CM

## 2024-11-12 DIAGNOSIS — J96.00 ACUTE RESPIRATORY FAILURE (HCC): ICD-10-CM

## 2024-11-12 DIAGNOSIS — I48.0 PAROXYSMAL ATRIAL FIBRILLATION (HCC): ICD-10-CM

## 2024-11-12 DIAGNOSIS — I51.7 CARDIOMEGALY: ICD-10-CM

## 2024-11-12 DIAGNOSIS — E11.65 TYPE 2 DIABETES MELLITUS WITH HYPERGLYCEMIA, UNSPECIFIED WHETHER LONG TERM INSULIN USE (HCC): ICD-10-CM

## 2024-11-12 DIAGNOSIS — N18.32 ANEMIA IN STAGE 3B CHRONIC KIDNEY DISEASE  (HCC): ICD-10-CM

## 2024-11-12 DIAGNOSIS — Z79.01 WARFARIN ANTICOAGULATION: ICD-10-CM

## 2024-11-12 DIAGNOSIS — D63.1 ANEMIA IN STAGE 3B CHRONIC KIDNEY DISEASE  (HCC): ICD-10-CM

## 2024-11-12 DIAGNOSIS — R56.9 SEIZURE (HCC): Primary | ICD-10-CM

## 2024-11-12 DIAGNOSIS — G40.909 SEIZURE DISORDER (HCC): ICD-10-CM

## 2024-11-12 DIAGNOSIS — G40.901 STATUS EPILEPTICUS (HCC): ICD-10-CM

## 2024-11-12 DIAGNOSIS — E03.4 HYPOTHYROIDISM DUE TO ACQUIRED ATROPHY OF THYROID: Chronic | ICD-10-CM

## 2024-11-12 DIAGNOSIS — I48.91 ATRIAL FIBRILLATION, UNSPECIFIED TYPE (HCC): ICD-10-CM

## 2024-11-12 DIAGNOSIS — L02.91 SKIN ABSCESS: ICD-10-CM

## 2024-11-12 DIAGNOSIS — J96.01 ACUTE HYPOXIC RESPIRATORY FAILURE (HCC): ICD-10-CM

## 2024-11-12 DIAGNOSIS — S70.12XA THIGH HEMATOMA, LEFT, INITIAL ENCOUNTER: ICD-10-CM

## 2024-11-12 DIAGNOSIS — R79.89 ELEVATED TROPONIN: ICD-10-CM

## 2024-11-12 LAB
2HR DELTA HS TROPONIN: 416 NG/L
4HR DELTA HS TROPONIN: 789 NG/L
ABO GROUP BLD: NORMAL
ABO GROUP BLD: NORMAL
ANION GAP SERPL CALCULATED.3IONS-SCNC: 12 MMOL/L (ref 4–13)
ANION GAP SERPL CALCULATED.3IONS-SCNC: 14 MMOL/L (ref 4–13)
APTT PPP: 47 SECONDS (ref 23–34)
ARTERIAL PATENCY WRIST A: YES
ATRIAL RATE: 182 BPM
BACTERIA UR QL AUTO: ABNORMAL /HPF
BASE EXCESS BLDA CALC-SCNC: -3.6 MMOL/L
BASOPHILS # BLD AUTO: 0.04 THOUSANDS/ÂΜL (ref 0–0.1)
BASOPHILS NFR BLD AUTO: 0 % (ref 0–1)
BILIRUB UR QL STRIP: NEGATIVE
BLD GP AB SCN SERPL QL: NEGATIVE
BLD GP AB SCN SERPL QL: NEGATIVE
BODY TEMPERATURE: 99.9 DEGREES FEHRENHEIT
BUN SERPL-MCNC: 24 MG/DL (ref 5–25)
BUN SERPL-MCNC: 25 MG/DL (ref 5–25)
CA-I BLD-SCNC: 0.99 MMOL/L (ref 1.12–1.32)
CALCIUM SERPL-MCNC: 8.9 MG/DL (ref 8.4–10.2)
CALCIUM SERPL-MCNC: 9.7 MG/DL (ref 8.4–10.2)
CARDIAC TROPONIN I PNL SERPL HS: 470 NG/L
CARDIAC TROPONIN I PNL SERPL HS: 54 NG/L
CARDIAC TROPONIN I PNL SERPL HS: 843 NG/L
CHLORIDE SERPL-SCNC: 100 MMOL/L (ref 96–108)
CHLORIDE SERPL-SCNC: 96 MMOL/L (ref 96–108)
CLARITY UR: ABNORMAL
CO2 SERPL-SCNC: 23 MMOL/L (ref 21–32)
CO2 SERPL-SCNC: 24 MMOL/L (ref 21–32)
COLOR UR: YELLOW
CREAT SERPL-MCNC: 1.7 MG/DL (ref 0.6–1.3)
CREAT SERPL-MCNC: 1.9 MG/DL (ref 0.6–1.3)
EOSINOPHIL # BLD AUTO: 0.01 THOUSAND/ÂΜL (ref 0–0.61)
EOSINOPHIL NFR BLD AUTO: 0 % (ref 0–6)
ERYTHROCYTE [DISTWIDTH] IN BLOOD BY AUTOMATED COUNT: 15.1 % (ref 11.6–15.1)
ERYTHROCYTE [DISTWIDTH] IN BLOOD BY AUTOMATED COUNT: 15.4 % (ref 11.6–15.1)
GFR SERPL CREATININE-BSD FRML MDRD: 27 ML/MIN/1.73SQ M
GFR SERPL CREATININE-BSD FRML MDRD: 31 ML/MIN/1.73SQ M
GLUCOSE SERPL-MCNC: 127 MG/DL (ref 65–140)
GLUCOSE SERPL-MCNC: 236 MG/DL (ref 65–140)
GLUCOSE SERPL-MCNC: 238 MG/DL (ref 65–140)
GLUCOSE UR STRIP-MCNC: NEGATIVE MG/DL
HCO3 BLDA-SCNC: 22.7 MMOL/L (ref 22–28)
HCT VFR BLD AUTO: 45.1 % (ref 34.8–46.1)
HCT VFR BLD AUTO: 45.8 % (ref 34.8–46.1)
HGB BLD-MCNC: 13.6 G/DL (ref 11.5–15.4)
HGB BLD-MCNC: 13.8 G/DL (ref 11.5–15.4)
HGB UR QL STRIP.AUTO: ABNORMAL
HOROWITZ INDEX BLDA+IHG-RTO: 100 MM[HG]
IMM GRANULOCYTES # BLD AUTO: 0.22 THOUSAND/UL (ref 0–0.2)
IMM GRANULOCYTES NFR BLD AUTO: 1 % (ref 0–2)
INR PPP: 2.22 (ref 0.85–1.19)
INR PPP: 2.42 (ref 0.85–1.19)
INR PPP: 3.2
KETONES UR STRIP-MCNC: NEGATIVE MG/DL
LACTATE SERPL-SCNC: 3.1 MMOL/L (ref 0.5–2)
LACTATE SERPL-SCNC: 3.1 MMOL/L (ref 0.5–2)
LEUKOCYTE ESTERASE UR QL STRIP: NEGATIVE
LYMPHOCYTES # BLD AUTO: 1.88 THOUSANDS/ÂΜL (ref 0.6–4.47)
LYMPHOCYTES NFR BLD AUTO: 11 % (ref 14–44)
MAGNESIUM SERPL-MCNC: 1.7 MG/DL (ref 1.9–2.7)
MCH RBC QN AUTO: 30.9 PG (ref 26.8–34.3)
MCH RBC QN AUTO: 31.8 PG (ref 26.8–34.3)
MCHC RBC AUTO-ENTMCNC: 30.1 G/DL (ref 31.4–37.4)
MCHC RBC AUTO-ENTMCNC: 30.2 G/DL (ref 31.4–37.4)
MCV RBC AUTO: 103 FL (ref 82–98)
MCV RBC AUTO: 105 FL (ref 82–98)
MONOCYTES # BLD AUTO: 1.46 THOUSAND/ÂΜL (ref 0.17–1.22)
MONOCYTES NFR BLD AUTO: 8 % (ref 4–12)
NEUTROPHILS # BLD AUTO: 13.83 THOUSANDS/ÂΜL (ref 1.85–7.62)
NEUTS SEG NFR BLD AUTO: 80 % (ref 43–75)
NITRITE UR QL STRIP: NEGATIVE
NON-SQ EPI CELLS URNS QL MICRO: ABNORMAL /HPF
NRBC BLD AUTO-RTO: 0 /100 WBCS
O2 CT BLDA-SCNC: 18.1 ML/DL (ref 16–23)
OXYHGB MFR BLDA: 95.6 % (ref 94–97)
PCO2 BLDA: 45.6 MM HG (ref 36–44)
PCO2 TEMP ADJ BLDA: 47 MM HG (ref 36–44)
PEEP RESPIRATORY: 6 CM[H2O]
PH BLD: 7.3 [PH] (ref 7.35–7.45)
PH BLDA: 7.32 [PH] (ref 7.35–7.45)
PH UR STRIP.AUTO: 7.5 [PH]
PHOSPHATE SERPL-MCNC: 3.3 MG/DL (ref 2.3–4.1)
PLATELET # BLD AUTO: 410 THOUSANDS/UL (ref 149–390)
PLATELET # BLD AUTO: 450 THOUSANDS/UL (ref 149–390)
PMV BLD AUTO: 10.3 FL (ref 8.9–12.7)
PMV BLD AUTO: 9.8 FL (ref 8.9–12.7)
PO2 BLD: 97.5 MM HG (ref 75–129)
PO2 BLDA: 93.4 MM HG (ref 75–129)
POTASSIUM SERPL-SCNC: 4.2 MMOL/L (ref 3.5–5.3)
POTASSIUM SERPL-SCNC: 4.7 MMOL/L (ref 3.5–5.3)
PROCALCITONIN SERPL-MCNC: 0.36 NG/ML
PROT UR STRIP-MCNC: ABNORMAL MG/DL
PROTHROMBIN TIME: 24.6 SECONDS (ref 12.3–15)
PROTHROMBIN TIME: 26.4 SECONDS (ref 12.3–15)
PROTHROMBIN TIME: 31.4 SEC (ref 9–11.5)
QRS AXIS: 113 DEGREES
QRSD INTERVAL: 92 MS
QT INTERVAL: 268 MS
QTC INTERVAL: 402 MS
RBC # BLD AUTO: 4.28 MILLION/UL (ref 3.81–5.12)
RBC # BLD AUTO: 4.47 MILLION/UL (ref 3.81–5.12)
RBC #/AREA URNS AUTO: ABNORMAL /HPF
RH BLD: POSITIVE
RH BLD: POSITIVE
SODIUM SERPL-SCNC: 134 MMOL/L (ref 135–147)
SODIUM SERPL-SCNC: 135 MMOL/L (ref 135–147)
SP GR UR STRIP.AUTO: 1.01 (ref 1–1.03)
SPECIMEN EXPIRATION DATE: NORMAL
SPECIMEN EXPIRATION DATE: NORMAL
SPECIMEN SOURCE: ABNORMAL
T WAVE AXIS: -80 DEGREES
UROBILINOGEN UR QL STRIP.AUTO: 0.2 E.U./DL
VENT AC: 14
VENT- AC: AC
VENTRICULAR RATE: 135 BPM
VT SETTING VENT: 400 ML
WBC # BLD AUTO: 14.72 THOUSAND/UL (ref 4.31–10.16)
WBC # BLD AUTO: 17.44 THOUSAND/UL (ref 4.31–10.16)
WBC #/AREA URNS AUTO: ABNORMAL /HPF

## 2024-11-12 PROCEDURE — NC001 PR NO CHARGE: Performed by: REGISTERED NURSE

## 2024-11-12 PROCEDURE — 71045 X-RAY EXAM CHEST 1 VIEW: CPT

## 2024-11-12 PROCEDURE — 80048 BASIC METABOLIC PNL TOTAL CA: CPT | Performed by: REGISTERED NURSE

## 2024-11-12 PROCEDURE — 70551 MRI BRAIN STEM W/O DYE: CPT

## 2024-11-12 PROCEDURE — 84145 PROCALCITONIN (PCT): CPT | Performed by: EMERGENCY MEDICINE

## 2024-11-12 PROCEDURE — 36556 INSERT NON-TUNNEL CV CATH: CPT | Performed by: EMERGENCY MEDICINE

## 2024-11-12 PROCEDURE — 82330 ASSAY OF CALCIUM: CPT | Performed by: REGISTERED NURSE

## 2024-11-12 PROCEDURE — 86850 RBC ANTIBODY SCREEN: CPT | Performed by: REGISTERED NURSE

## 2024-11-12 PROCEDURE — 87040 BLOOD CULTURE FOR BACTERIA: CPT | Performed by: REGISTERED NURSE

## 2024-11-12 PROCEDURE — 86850 RBC ANTIBODY SCREEN: CPT | Performed by: INTERNAL MEDICINE

## 2024-11-12 PROCEDURE — 84100 ASSAY OF PHOSPHORUS: CPT | Performed by: REGISTERED NURSE

## 2024-11-12 PROCEDURE — 36415 COLL VENOUS BLD VENIPUNCTURE: CPT | Performed by: INTERNAL MEDICINE

## 2024-11-12 PROCEDURE — 87205 SMEAR GRAM STAIN: CPT | Performed by: STUDENT IN AN ORGANIZED HEALTH CARE EDUCATION/TRAINING PROGRAM

## 2024-11-12 PROCEDURE — P9017 PLASMA 1 DONOR FRZ W/IN 8 HR: HCPCS

## 2024-11-12 PROCEDURE — 82805 BLOOD GASES W/O2 SATURATION: CPT | Performed by: REGISTERED NURSE

## 2024-11-12 PROCEDURE — 02HV33Z INSERTION OF INFUSION DEVICE INTO SUPERIOR VENA CAVA, PERCUTANEOUS APPROACH: ICD-10-PCS | Performed by: EMERGENCY MEDICINE

## 2024-11-12 PROCEDURE — 84484 ASSAY OF TROPONIN QUANT: CPT | Performed by: INTERNAL MEDICINE

## 2024-11-12 PROCEDURE — 85025 COMPLETE CBC W/AUTO DIFF WBC: CPT | Performed by: REGISTERED NURSE

## 2024-11-12 PROCEDURE — 87070 CULTURE OTHR SPECIMN AEROBIC: CPT | Performed by: STUDENT IN AN ORGANIZED HEALTH CARE EDUCATION/TRAINING PROGRAM

## 2024-11-12 PROCEDURE — 70498 CT ANGIOGRAPHY NECK: CPT

## 2024-11-12 PROCEDURE — 87077 CULTURE AEROBIC IDENTIFY: CPT | Performed by: INTERNAL MEDICINE

## 2024-11-12 PROCEDURE — 70496 CT ANGIOGRAPHY HEAD: CPT

## 2024-11-12 PROCEDURE — 93005 ELECTROCARDIOGRAM TRACING: CPT

## 2024-11-12 PROCEDURE — 86901 BLOOD TYPING SEROLOGIC RH(D): CPT | Performed by: INTERNAL MEDICINE

## 2024-11-12 PROCEDURE — 87186 SC STD MICRODIL/AGAR DIL: CPT | Performed by: INTERNAL MEDICINE

## 2024-11-12 PROCEDURE — 93010 ELECTROCARDIOGRAM REPORT: CPT | Performed by: INTERNAL MEDICINE

## 2024-11-12 PROCEDURE — 80048 BASIC METABOLIC PNL TOTAL CA: CPT | Performed by: INTERNAL MEDICINE

## 2024-11-12 PROCEDURE — 36620 INSERTION CATHETER ARTERY: CPT | Performed by: EMERGENCY MEDICINE

## 2024-11-12 PROCEDURE — 99291 CRITICAL CARE FIRST HOUR: CPT | Performed by: STUDENT IN AN ORGANIZED HEALTH CARE EDUCATION/TRAINING PROGRAM

## 2024-11-12 PROCEDURE — 94760 N-INVAS EAR/PLS OXIMETRY 1: CPT

## 2024-11-12 PROCEDURE — 5A1955Z RESPIRATORY VENTILATION, GREATER THAN 96 CONSECUTIVE HOURS: ICD-10-PCS | Performed by: EMERGENCY MEDICINE

## 2024-11-12 PROCEDURE — 99291 CRITICAL CARE FIRST HOUR: CPT | Performed by: EMERGENCY MEDICINE

## 2024-11-12 PROCEDURE — 83605 ASSAY OF LACTIC ACID: CPT | Performed by: REGISTERED NURSE

## 2024-11-12 PROCEDURE — 86900 BLOOD TYPING SEROLOGIC ABO: CPT | Performed by: REGISTERED NURSE

## 2024-11-12 PROCEDURE — 96366 THER/PROPH/DIAG IV INF ADDON: CPT

## 2024-11-12 PROCEDURE — 96376 TX/PRO/DX INJ SAME DRUG ADON: CPT

## 2024-11-12 PROCEDURE — 96365 THER/PROPH/DIAG IV INF INIT: CPT

## 2024-11-12 PROCEDURE — 85610 PROTHROMBIN TIME: CPT | Performed by: REGISTERED NURSE

## 2024-11-12 PROCEDURE — 94002 VENT MGMT INPAT INIT DAY: CPT

## 2024-11-12 PROCEDURE — 86901 BLOOD TYPING SEROLOGIC RH(D): CPT | Performed by: REGISTERED NURSE

## 2024-11-12 PROCEDURE — 81003 URINALYSIS AUTO W/O SCOPE: CPT | Performed by: INTERNAL MEDICINE

## 2024-11-12 PROCEDURE — 96375 TX/PRO/DX INJ NEW DRUG ADDON: CPT

## 2024-11-12 PROCEDURE — 83605 ASSAY OF LACTIC ACID: CPT | Performed by: EMERGENCY MEDICINE

## 2024-11-12 PROCEDURE — 99285 EMERGENCY DEPT VISIT HI MDM: CPT

## 2024-11-12 PROCEDURE — 85730 THROMBOPLASTIN TIME PARTIAL: CPT | Performed by: INTERNAL MEDICINE

## 2024-11-12 PROCEDURE — 82948 REAGENT STRIP/BLOOD GLUCOSE: CPT

## 2024-11-12 PROCEDURE — 85610 PROTHROMBIN TIME: CPT | Performed by: INTERNAL MEDICINE

## 2024-11-12 PROCEDURE — 76937 US GUIDE VASCULAR ACCESS: CPT | Performed by: EMERGENCY MEDICINE

## 2024-11-12 PROCEDURE — 96367 TX/PROPH/DG ADDL SEQ IV INF: CPT

## 2024-11-12 PROCEDURE — 81001 URINALYSIS AUTO W/SCOPE: CPT | Performed by: INTERNAL MEDICINE

## 2024-11-12 PROCEDURE — 85027 COMPLETE CBC AUTOMATED: CPT | Performed by: INTERNAL MEDICINE

## 2024-11-12 PROCEDURE — 88112 CYTOPATH CELL ENHANCE TECH: CPT | Performed by: STUDENT IN AN ORGANIZED HEALTH CARE EDUCATION/TRAINING PROGRAM

## 2024-11-12 PROCEDURE — 83735 ASSAY OF MAGNESIUM: CPT | Performed by: REGISTERED NURSE

## 2024-11-12 PROCEDURE — 86900 BLOOD TYPING SEROLOGIC ABO: CPT | Performed by: INTERNAL MEDICINE

## 2024-11-12 PROCEDURE — 87086 URINE CULTURE/COLONY COUNT: CPT | Performed by: INTERNAL MEDICINE

## 2024-11-12 RX ORDER — FENTANYL CITRATE 50 UG/ML
50 INJECTION, SOLUTION INTRAMUSCULAR; INTRAVENOUS
Status: DISCONTINUED | OUTPATIENT
Start: 2024-11-12 | End: 2024-11-21

## 2024-11-12 RX ORDER — ETOMIDATE 2 MG/ML
20 INJECTION INTRAVENOUS ONCE
Status: COMPLETED | OUTPATIENT
Start: 2024-11-12 | End: 2024-11-12

## 2024-11-12 RX ORDER — DILTIAZEM HYDROCHLORIDE 5 MG/ML
15 INJECTION INTRAVENOUS ONCE
Status: COMPLETED | OUTPATIENT
Start: 2024-11-12 | End: 2024-11-12

## 2024-11-12 RX ORDER — SODIUM CHLORIDE, SODIUM GLUCONATE, SODIUM ACETATE, POTASSIUM CHLORIDE, MAGNESIUM CHLORIDE, SODIUM PHOSPHATE, DIBASIC, AND POTASSIUM PHOSPHATE .53; .5; .37; .037; .03; .012; .00082 G/100ML; G/100ML; G/100ML; G/100ML; G/100ML; G/100ML; G/100ML
100 INJECTION, SOLUTION INTRAVENOUS CONTINUOUS
Status: DISCONTINUED | OUTPATIENT
Start: 2024-11-12 | End: 2024-11-14

## 2024-11-12 RX ORDER — VANCOMYCIN HYDROCHLORIDE 1 G/200ML
1000 INJECTION, SOLUTION INTRAVENOUS EVERY 24 HOURS
Status: DISCONTINUED | OUTPATIENT
Start: 2024-11-13 | End: 2024-11-13

## 2024-11-12 RX ORDER — LORAZEPAM 2 MG/ML
2 INJECTION INTRAMUSCULAR ONCE
Status: COMPLETED | OUTPATIENT
Start: 2024-11-12 | End: 2024-11-12

## 2024-11-12 RX ORDER — ONDANSETRON 2 MG/ML
4 INJECTION INTRAMUSCULAR; INTRAVENOUS EVERY 4 HOURS PRN
Status: DISCONTINUED | OUTPATIENT
Start: 2024-11-12 | End: 2024-12-09 | Stop reason: HOSPADM

## 2024-11-12 RX ORDER — LIDOCAINE HYDROCHLORIDE 10 MG/ML
INJECTION, SOLUTION EPIDURAL; INFILTRATION; INTRACAUDAL; PERINEURAL
Status: COMPLETED
Start: 2024-11-12 | End: 2024-11-13

## 2024-11-12 RX ORDER — ACETAMINOPHEN 650 MG/1
650 SUPPOSITORY RECTAL ONCE
Status: DISCONTINUED | OUTPATIENT
Start: 2024-11-12 | End: 2024-11-12 | Stop reason: HOSPADM

## 2024-11-12 RX ORDER — PROPOFOL 10 MG/ML
5-50 INJECTION, EMULSION INTRAVENOUS
Status: DISCONTINUED | OUTPATIENT
Start: 2024-11-12 | End: 2024-11-12 | Stop reason: HOSPADM

## 2024-11-12 RX ORDER — ALBUTEROL SULFATE 0.83 MG/ML
2.5 SOLUTION RESPIRATORY (INHALATION) EVERY 6 HOURS PRN
Status: DISCONTINUED | OUTPATIENT
Start: 2024-11-12 | End: 2024-11-13

## 2024-11-12 RX ORDER — LORAZEPAM 2 MG/ML
INJECTION INTRAMUSCULAR
Status: COMPLETED
Start: 2024-11-12 | End: 2024-11-12

## 2024-11-12 RX ORDER — LEVETIRACETAM 500 MG/5ML
1000 INJECTION, SOLUTION, CONCENTRATE INTRAVENOUS EVERY 12 HOURS SCHEDULED
Status: DISCONTINUED | OUTPATIENT
Start: 2024-11-13 | End: 2024-11-18

## 2024-11-12 RX ORDER — FENTANYL CITRATE 50 UG/ML
100 INJECTION, SOLUTION INTRAMUSCULAR; INTRAVENOUS ONCE
Status: COMPLETED | OUTPATIENT
Start: 2024-11-12 | End: 2024-11-12

## 2024-11-12 RX ORDER — LEVETIRACETAM 500 MG/5ML
3000 INJECTION, SOLUTION, CONCENTRATE INTRAVENOUS ONCE
Status: COMPLETED | OUTPATIENT
Start: 2024-11-12 | End: 2024-11-12

## 2024-11-12 RX ORDER — CHLORHEXIDINE GLUCONATE ORAL RINSE 1.2 MG/ML
15 SOLUTION DENTAL EVERY 12 HOURS SCHEDULED
Status: DISCONTINUED | OUTPATIENT
Start: 2024-11-12 | End: 2024-12-02

## 2024-11-12 RX ORDER — LORAZEPAM 2 MG/ML
1 INJECTION INTRAMUSCULAR ONCE
Status: DISCONTINUED | OUTPATIENT
Start: 2024-11-12 | End: 2024-11-12

## 2024-11-12 RX ORDER — LEVETIRACETAM 500 MG/5ML
1000 INJECTION, SOLUTION, CONCENTRATE INTRAVENOUS ONCE
Status: DISCONTINUED | OUTPATIENT
Start: 2024-11-12 | End: 2024-11-12

## 2024-11-12 RX ORDER — ROCURONIUM BROMIDE 10 MG/ML
1 INJECTION, SOLUTION INTRAVENOUS ONCE
Status: COMPLETED | OUTPATIENT
Start: 2024-11-12 | End: 2024-11-12

## 2024-11-12 RX ORDER — CALCIUM GLUCONATE 20 MG/ML
2 INJECTION, SOLUTION INTRAVENOUS ONCE
Status: COMPLETED | OUTPATIENT
Start: 2024-11-12 | End: 2024-11-13

## 2024-11-12 RX ORDER — AMOXICILLIN 250 MG
2 CAPSULE ORAL 2 TIMES DAILY
Status: DISCONTINUED | OUTPATIENT
Start: 2024-11-12 | End: 2024-11-16

## 2024-11-12 RX ORDER — CEFEPIME HYDROCHLORIDE 2 G/50ML
2000 INJECTION, SOLUTION INTRAVENOUS ONCE
Status: DISCONTINUED | OUTPATIENT
Start: 2024-11-12 | End: 2024-11-12 | Stop reason: HOSPADM

## 2024-11-12 RX ORDER — PROPOFOL 10 MG/ML
5-50 INJECTION, EMULSION INTRAVENOUS
Status: DISCONTINUED | OUTPATIENT
Start: 2024-11-12 | End: 2024-11-13

## 2024-11-12 RX ORDER — MAGNESIUM SULFATE HEPTAHYDRATE 40 MG/ML
2 INJECTION, SOLUTION INTRAVENOUS ONCE
Status: COMPLETED | OUTPATIENT
Start: 2024-11-12 | End: 2024-11-13

## 2024-11-12 RX ORDER — MIDAZOLAM HYDROCHLORIDE 1 MG/ML
3 INJECTION INTRAMUSCULAR; INTRAVENOUS ONCE
Status: COMPLETED | OUTPATIENT
Start: 2024-11-12 | End: 2024-11-12

## 2024-11-12 RX ORDER — ACETAMINOPHEN 10 MG/ML
1000 INJECTION, SOLUTION INTRAVENOUS EVERY 6 HOURS PRN
Status: DISCONTINUED | OUTPATIENT
Start: 2024-11-12 | End: 2024-11-28

## 2024-11-12 RX ORDER — MIDAZOLAM HYDROCHLORIDE 2 MG/2ML
2 INJECTION, SOLUTION INTRAMUSCULAR; INTRAVENOUS ONCE
Status: DISCONTINUED | OUTPATIENT
Start: 2024-11-12 | End: 2024-11-15

## 2024-11-12 RX ADMIN — LORAZEPAM 2 MG: 2 INJECTION INTRAMUSCULAR at 18:08

## 2024-11-12 RX ADMIN — LEVETIRACETAM 3000 MG: 100 INJECTION, SOLUTION INTRAVENOUS at 09:19

## 2024-11-12 RX ADMIN — LORAZEPAM 2 MG: 2 INJECTION INTRAMUSCULAR; INTRAVENOUS at 15:30

## 2024-11-12 RX ADMIN — ROCURONIUM BROMIDE 55 MG: 10 INJECTION, SOLUTION INTRAVENOUS at 18:29

## 2024-11-12 RX ADMIN — FENTANYL CITRATE 50 MCG: 50 INJECTION INTRAMUSCULAR; INTRAVENOUS at 23:52

## 2024-11-12 RX ADMIN — SODIUM CHLORIDE 1000 MG: 9 INJECTION, SOLUTION INTRAVENOUS at 18:50

## 2024-11-12 RX ADMIN — NOREPINEPHRINE BITARTRATE 18 MCG/MIN: 1 INJECTION, SOLUTION, CONCENTRATE INTRAVENOUS at 22:10

## 2024-11-12 RX ADMIN — NOREPINEPHRINE BITARTRATE 4 MCG/MIN: 1 SOLUTION INTRAVENOUS at 19:10

## 2024-11-12 RX ADMIN — IOHEXOL 85 ML: 350 INJECTION, SOLUTION INTRAVENOUS at 08:26

## 2024-11-12 RX ADMIN — DILTIAZEM HYDROCHLORIDE 15 MG: 5 INJECTION, SOLUTION INTRAVENOUS at 08:59

## 2024-11-12 RX ADMIN — SODIUM CHLORIDE 500 ML: 0.9 INJECTION, SOLUTION INTRAVENOUS at 17:00

## 2024-11-12 RX ADMIN — PHYTONADIONE 10 MG: 10 INJECTION, EMULSION INTRAMUSCULAR; INTRAVENOUS; SUBCUTANEOUS at 21:02

## 2024-11-12 RX ADMIN — PROPOFOL 20 MCG/KG/MIN: 10 INJECTION, EMULSION INTRAVENOUS at 19:38

## 2024-11-12 RX ADMIN — LORAZEPAM 2 MG: 2 INJECTION INTRAMUSCULAR; INTRAVENOUS at 09:41

## 2024-11-12 RX ADMIN — ETOMIDATE 20 MG: 20 INJECTION, SOLUTION INTRAVENOUS at 18:27

## 2024-11-12 RX ADMIN — SODIUM CHLORIDE 500 ML: 0.9 INJECTION, SOLUTION INTRAVENOUS at 17:46

## 2024-11-12 RX ADMIN — PROPOFOL 13 MCG/KG/MIN: 10 INJECTION, EMULSION INTRAVENOUS at 22:35

## 2024-11-12 RX ADMIN — LORAZEPAM 2 MG: 2 INJECTION INTRAMUSCULAR; INTRAVENOUS at 18:08

## 2024-11-12 RX ADMIN — VASOPRESSIN 0.04 UNITS/MIN: 20 INJECTION INTRAVENOUS at 23:37

## 2024-11-12 RX ADMIN — LORAZEPAM 2 MG: 2 INJECTION INTRAMUSCULAR at 15:30

## 2024-11-12 NOTE — EMTALA/ACUTE CARE TRANSFER
[facility] EMERGENCY DEPARTMENT  [address] [place] [id number]  Dept: [phone]      EMTALA TRANSFER CONSENT    NAME Adelina KEARNS 1960                              MRN [id number]    I have been informed of my rights regarding examination, treatment, and transfer   by Dr. Xander Sahni MD    Benefits: Specialized equipment and/or services available at the receiving facility (Include comment)________________________    Risks: Potential for delay in receiving treatment, Potential deterioration of medical condition, Loss of IV, Increased discomfort during transfer, Possible worsening of condition or death during transfer      Transfer Request   I acknowledge that my medical condition has been evaluated and explained to me by the emergency department physician or other qualified medical person and/or my attending physician who has recommended and offered to me further medical examination and treatment. I understand the Hospital's obligation with respect to the treatment and stabilization of my emergency medical condition. I nevertheless request to be transferred. I release the Hospital, the doctor, and any other persons caring for me from all responsibility or liability for any injury or ill effects that may result from my transfer and agree to accept all responsibility for the consequences of my choice to transfer, rather than receive stabilizing treatment at the Hospital. I understand that because the transfer is my request, my insurance may not provide reimbursement for the services.  The Hospital will assist and direct me and my family in how to make arrangements for transfer, but the hospital is not liable for any fees charged by the transport service.  In spite of this understanding, I refuse to consent to further medical examination and treatment which has been offered to me, and request transfer to Accepting Facility Name, [facility]  & [place] : [place]. I authorize the performance of emergency medical procedures and treatments upon me in both transit and upon arrival at the receiving facility.  Additionally, I authorize the release of any and all medical records to the receiving facility and request they be transported with me, if possible.    I authorize the performance of emergency medical procedures and treatments upon me in both transit and upon arrival at the receiving facility.  Additionally, I authorize the release of any and all medical records to the receiving facility and request they be transported with me, if possible.  I understand that the safest mode of transportation during a medical emergency is an ambulance and that the Hospital advocates the use of this mode of transport. Risks of traveling to the receiving facility by car, including absence of medical control, life sustaining equipment, such as oxygen, and medical personnel has been explained to me and I fully understand them.    (SWAPNIL CORRECT BOX BELOW)  [  ]  I consent to the stated transfer and to be transported by ambulance/helicopter.  [  ]  I consent to the stated transfer, but refuse transportation by ambulance and accept full responsibility for my transportation by car.  I understand the risks of non-ambulance transfers and I exonerate the Hospital and its staff from any deterioration in my condition that results from this refusal.    X___________________________________________    DATE  24  TIME________  Signature of patient or legally responsible individual signing on patient behalf           RELATIONSHIP TO PATIENT_________________________          Provider Certification    NAME Adelina Soto                                         1960                              MRN [id number]    A medical screening exam was performed on the above named patient.  Based on the examination:    Condition Necessitating Transfer The primary encounter diagnosis was  Stroke-like symptom. Diagnoses of Seizure disorder (HCC) and Altered mental status, unspecified altered mental status type were also pertinent to this visit.    Patient Condition: The patient has been stabilized such that within reasonable medical probability, no material deterioration of the patient condition or the condition of the unborn child(ana) is likely to result from the transfer    Reason for Transfer: Level of Care needed not available at this facility    Transfer Requirements: Facility [facility]   Space available and qualified personnel available for treatment as acknowledged by    Agreed to accept transfer and to provide appropriate medical treatment as acknowledged by       LEONARDO  Appropriate medical records of the examination and treatment of the patient are provided at the time of transfer   STAFF INITIAL WHEN COMPLETED _______  Transfer will be performed by qualified personnel from    and appropriate transfer equipment as required, including the use of necessary and appropriate life support measures.    Provider Certification: I have examined the patient and explained the following risks and benefits of being transferred/refusing transfer to the patient/family:  General risk, such as traffic hazards, adverse weather conditions, rough terrain or turbulence, possible failure of equipment (including vehicle or aircraft), or consequences of actions of persons outside the control of the transport personnel, Unanticipated needs of medical equipment and personnel during transport, Risk of worsening condition, The possibility of a transport vehicle being unavailable      Based on these reasonable risks and benefits to the patient and/or the unborn child(ana), and based upon the information available at the time of the patient’s examination, I certify that the medical benefits reasonably to be expected from the provision of appropriate medical treatments at another medical facility outweigh the increasing risks,  if any, to the individual’s medical condition, and in the case of labor to the unborn child, from effecting the transfer.    X____________________________________________ DATE 11/12/24        TIME_______      ORIGINAL - SEND TO MEDICAL RECORDS   COPY - SEND WITH PATIENT DURING TRANSFER

## 2024-11-12 NOTE — ED NOTES
Patient's family member came out of room and stated pt is having another seizure, provider to bedside. Verbal orders for 2 mg Ativan IV push.     Jemma Scales RN  11/12/24 2028

## 2024-11-12 NOTE — ED PROVIDER NOTES
Time reflects when diagnosis was documented in both MDM as applicable and the Disposition within this note       Time User Action Codes Description Comment    11/12/2024  7:54 AM AbdKole andradeeb Add [R29.90] Stroke-like symptom     11/12/2024  9:30 AM Abdelmaltorsten Moheb Add [G40.909] Seizure disorder (HCC)     11/12/2024  9:30 AM AbdelmalKole sarmientoeb Add [R41.82] Altered mental status, unspecified altered mental status type     11/12/2024  1:29 PM Abdelmaltorsten Moheb Add [I48.91] Atrial fibrillation, unspecified type (HCC)     11/12/2024  1:29 PM Abdelmalek Moheb Add [R79.89] Elevated troponin     11/12/2024  6:46 PM Abdelmaltorsten Moheb Add [G40.901] Status epilepticus (HCC)     11/13/2024 10:55 AM Abdelmaltorsten Moheb Add [G04.90] Encephalitis           ED Disposition       ED Disposition   Transfer to Another Facility-In Network    Condition   --    Date/Time   Tue Nov 12, 2024  9:30 AM    Comment   Adelina Soto should be transferred out to [facility]               Assessment & Plan       Medical Decision Making  This is a 64 years old brought by EMS for for having altered mental status.  Patient was found by her sister on 5:30 a.m. unresponsive.  So she called 911 at around 6:30 am and when the evaluated the area patient has 2 seizures which with tonic-clonic patient was given Versed 6 mg IV.  When patient brought to the ER patient his left gaze and the right Ahsan neglect.  Patient is unresponsive and she is nonverbal.  Code stroke called immediately and case discussed with the neurologist .  Patient sented to the CT and CTA of head and neck.  Patient sister came to the ER and she stated that the last time she was seen normal was yesterday.  Patient has history of A-fib and she is on Coumadin and history of cancer kidney and arthritis, anemia hypertension ovarian cancer.  Patient is not communicating at the ER.  NIH score came back 10.  Physical exam significant for patient not communicating unresponsive with left  gaze.  CT head; no acute intracranial finding.  CTA head and neck;  No large vessel occlusion, high-grade stenosis, or intracranial aneurysm identified on CT angiogram of the head.   CT angiogram of the neck mild degraded by motion artifact particularly at the level of the carotid bifurcations and carotid bulbs, but there is at most approximately 40 to 50% stenosis of the bilateral cervical internal carotid arteries. No significant   vertebral artery stenosis noted. No dissection identified.   Mildly enlarged main pulmonary artery, indicative of pulmonary hypertension.  CXR; NO acute infiltrate, mild R base atelectasis.  MRI brain;   IMPRESSION:  Limited by motion.  Mild restricted diffusion with abnormal FLAIR signal in the mesial left temporal lobe could be postictal. Recommend correlation with CSF to exclude infection such as HSV.  Abnormal signal in the right thalamus of uncertain etiology.  Recommend repeat MRI with contrast.    EKG; A.Fib rate 135/min, low voltage, PVC, old septal infarction.    Would you be able to see when it would get done once we have an idea of when she'll be transferred? Just need to know as they can place MRI compatible leads if she doesn't get the MRI done at [facility]    Pt to be transferred to [facility] for EMU .   While pt at er has another seizure . Pt troponin keep coming up from 54 to 470 to 843. No changes in EKG. Pt still post ictal , unresposive.     Case discussed with cardiologist dr Haile ; and he stated about the high troponin. ;  likely, and rapid afib.  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  Also patient has no fever.   recommended to start the patient on acyclovir,  intubating her, start him on Depakote IV, and possible need Levophed and propofol for sedation.  And patient would be admitted to ICU.      Amount and/or Complexity of Data Reviewed  Labs: ordered.  Radiology: ordered.     Details: CT head; no acute intracranial finding.  CTA head and neck;  No large vessel occlusion, high-grade stenosis, or intracranial aneurysm identified on CT angiogram of the head.   CT angiogram of the neck mild degraded by motion artifact particularly at the level of the carotid bifurcations and carotid bulbs, but there is at most approximately 40 to 50% stenosis of the bilateral cervical internal carotid arteries. No significant   vertebral artery stenosis noted. No dissection identified.   Mildly enlarged main pulmonary artery, indicative of pulmonary hypertension.  CXR; NO acute infiltrate, mild R base atelectasis      ECG/medicine tests: ordered and independent interpretation performed.     Details: EKG; A.Fib rate 135/min, low voltage, PVC, old septal infarction.     Discussion of management or test interpretation with external provider(s):  Would you be able to see when it would get done once we have an idea of when she'll be transferred? Just need to know as they can place MRI compatible leads if she doesn't get the MRI done at [facility]      Case discussed with cardiologist dr Haile ; and he stated about the high troponin. ;  likely, and rapid afib.  nothing different to do from a cardiac standpoint other than what is planned from neuro perspective             Risk  Prescription drug management.        ED Course as of 11/14/24 0945   Tue Nov 12, 2024   1129 Pt had multiple seizures and increased troponin from multiple seizures.       Medications   midazolam (FOR EMS ONLY) (VERSED) 2 mg/2 mL injection 6 mg (0 mg Does not apply Given to EMS 11/12/24 4245)   iohexol (OMNIPAQUE) 350 MG/ML injection (SINGLE-DOSE) 85 mL (85 mL Intravenous Given 11/12/24 9372)   levETIRAcetam (KEPPRA) injection  3,000 mg (3,000 mg Intravenous Given 11/12/24 0919)   diltiazem (CARDIZEM) injection 15 mg (15 mg Intravenous Given 11/12/24 0859)   LORazepam (ATIVAN) injection 2 mg (2 mg Intravenous Given 11/12/24 0941)   LORazepam (ATIVAN) injection 2 mg (2 mg Intravenous Given 11/12/24 1530)   sodium chloride 0.9 % bolus 500 mL (0 mL Intravenous Stopped 11/12/24 1719)   sodium chloride 0.9 % bolus 500 mL (0 mL Intravenous Stopped 11/12/24 1813)   valproate (DEPACON) 1,000 mg in sodium chloride 0.9 % 50 mL BOLUS (0 mg Intravenous Stopped 11/12/24 1950)   LORazepam (ATIVAN) injection 2 mg (2 mg Intravenous Given 11/12/24 1808)   etomidate (AMIDATE) 2 mg/mL injection 20 mg (20 mg Intravenous Given 11/12/24 1827)   ROCuronium (ZEMURON) injection 55 mg (55 mg Intravenous Given 11/12/24 1829)   phytonadione (AQUA-MEPHYTON) 10 mg/mL 10 mg in sodium chloride 0.9 % 50 mL IVPB (0 mg Intravenous Stopped 11/12/24 2135)       ED Risk Strat Scores       Stroke Assessment       Row Name 11/12/24 0828             NIH Stroke Scale    Interval Baseline      Level of Consciousness (1a.) 0      LOC Questions (1b.) 2      LOC Commands (1c.) 2      Best Gaze (2.) 1      Visual (3.) 0      Facial Palsy (4.) 0      Motor Arm, Left (5a.) 2      Motor Arm, Right (5b.) 0      Motor Leg, Left (6a.) 0      Motor Leg, Right (6b.) 0      Limb Ataxia (7.) 0      Sensory (8.) 1      Best Language (9.) 0      Dysarthria (10.) 0      Extinction and Inattention (11.) (Formerly Neglect) 2      Total 10                                                          History of Present Illness       Chief Complaint   Patient presents with    Altered Mental Status     Sister noticed patient was altered this morning.  Hallucinating.  2 seizure in ems presence.  No hx of seizure.  Bs 199  6mg versed given.  18 lac given.        Past Medical History:   Diagnosis Date    Anemia     Arthritis     Cancer of kidney (HCC)     Chronic kidney disease     Diabetes mellitus (HCC)      Disease of thyroid gland     HLD (hyperlipidemia)     Hypertension     Ovarian cancer (HCC)     Pneumonia       Past Surgical History:   Procedure Laterality Date    CHOLECYSTECTOMY      CT GUIDED PERC DRAINAGE CATHETER PLACEMENT  2016    GALLBLADDER SURGERY  2004    HYSTERECTOMY  2018    NEPHRECTOMY Right 2018      Family History   Problem Relation Age of Onset    No Known Problems Mother     No Known Problems Father       Social History     Tobacco Use    Smoking status: Former     Current packs/day: 0.00     Average packs/day: 3.0 packs/day for 30.0 years (90.0 ttl pk-yrs)     Types: Cigarettes     Start date: 10/22/1980     Quit date: 10/22/2010     Years since quittin.0    Smokeless tobacco: Former     Quit date: 2011    Tobacco comments:     quit approx. 9 years ago   Vaping Use    Vaping status: Never Used   Substance Use Topics    Alcohol use: Never     Comment: n/a    Drug use: Yes     Types: Marijuana     Comment: smokes with girlfriend when anxious      E-Cigarette/Vaping    E-Cigarette Use Never User       E-Cigarette/Vaping Substances    Nicotine No     THC Yes     CBD No     Flavoring No       I have reviewed and agree with the history as documented.     This is a 64 years old brought by EMS for having seizure disorder.  According to patient's sister she wake up around 5:30 this morning and she found her unresponsive.  So she called 911 and when the ambulance arrived there patient has 2 seizures each 1 presents for about 1 minute and it was tonic-clonic seizure patient was giving Versed 6 mg IV by the EMS.  Patient has right gaze and, she keeps looking to the right side cannot be distracted.  Patient does not talk, and according to the patient's sister last admission she was normal was last night and she did not give me exact time.  Patient is unresponsive to the painful stimuli.  And patient is not communicating.  Patient has history of A-fib and she is on Coumadin.  On  the arrival patient has rapid A-fib.  Code stroke called immediately and I talked to the neurologist Dr.Brendan Thompson, and the case discussed with him and patient escorted to the CT immediately.      History provided by:  EMS personnel  Altered Mental Status  Presenting symptoms: unresponsiveness    Severity:  Severe  Progression:  Unchanged  Chronicity:  New      Review of Systems   Unable to perform ROS: Mental status change   Pt is awake , unresposive        Objective       ED Triage Vitals   Temperature Pulse Blood Pressure Respirations SpO2 Patient Position - Orthostatic VS   11/12/24 0822 11/12/24 0815 11/12/24 0803 11/12/24 0815 11/12/24 0815 11/12/24 0815   98.8 °F (37.1 °C) (!) 147 151/70 15 97 % Lying      Temp Source Heart Rate Source BP Location FiO2 (%) Pain Score    11/12/24 0822 11/12/24 0815 11/12/24 0815 11/12/24 2130 11/12/24 0815    Oral Monitor Right arm 100 No Pain      Vitals      Date and Time Temp Pulse SpO2 Resp BP Pain Score FACES Pain Rating User   11/12/24 2131 100.2 °F (37.9 °C) 124 94 % 17 112/52 -- -- MS   11/12/24 2130 100.2 °F (37.9 °C) 126 94 % 25 113/56 -- -- MS   11/12/24 2125 100.2 °F (37.9 °C) 124 95 % 15 110/55 -- -- MS   11/12/24 2120 100.2 °F (37.9 °C) 124 94 % 15 96/59 -- -- MS   11/12/24 2115 100.2 °F (37.9 °C) 122 94 % 15 91/68 -- -- MS   11/12/24 2110 100 °F (37.8 °C) 122 93 % 15 99/63 -- -- MS   11/12/24 2105 100 °F (37.8 °C) 124 83 % 17 96/65 -- -- MS   11/12/24 2100 99.9 °F (37.7 °C) 122 81 % MD [place]. 20 84/62 -- -- MS   11/12/24 2058 99.9 °F (37.7 °C) 118 83 % 21 85/52 -- -- MS   11/12/24 2055 99.9 °F (37.7 °C) 120 80 % 20 85/52 -- -- MS   11/12/24 2050 99.7 °F (37.6 °C) 124 85 % 17 94/52 -- -- MS   11/12/24 2048 99.7 °F (37.6 °C) 125 84 % 17 81/48 -- -- MS   11/12/24 2045 99.5 °F (37.5 °C) 127 89 % 16 81/48 -- -- MS   11/12/24 2040 99.3 °F (37.4 °C) 125 87 % 19 84/37 -- -- MS   11/12/24 2037 99.1 °F (37.3 °C) 120 87 % 18 80/34 -- -- MS   11/12/24 2031 -- 123  89 % 17 147/63 -- -- MS   11/12/24 2026 -- 130 92 % 20 93/61 -- -- MS   11/12/24 2021 -- 131 91 % 21 90/64 -- -- MS   11/12/24 2015 -- 122 91 % 19 109/61 -- -- MS   11/12/24 2010 -- 122 91 % 19 104/54 -- -- MS   11/12/24 2005 -- 126 92 % 18 99/50 -- -- MS   11/12/24 2000 -- 124 91 % 18 101/56 -- -- MS   11/12/24 1955 -- 124 91 % 25 121/64 -- -- MS   11/12/24 1950 -- 123 91 % 22 111/53 -- -- MS   11/12/24 1946 -- 122 92 % 19 118/52 -- -- MS   11/12/24 1940 -- 127 92 % 23 99/61 -- -- MS   11/12/24 1935 -- 125 92 % 22 109/67 -- -- MS   11/12/24 1930 -- 124 93 % 20 113/83 -- -- MS   11/12/24 1926 -- 119 -- 22 105/74 -- -- MS   11/12/24 1921 -- 116 -- 18 95/49 -- -- MS   11/12/24 1916 -- 124 95 % 19 89/49 -- -- MS   11/12/24 1910 -- 124 -- -- 84/63 -- -- MS   11/12/24 1905 -- 123 94 % 16 84/63 -- -- MS   11/12/24 1900 -- 120 94 % 16 79/62 -- -- MS   11/12/24 1849 -- -- 96 % -- -- -- -- LS   11/12/24 1800 -- 111 92 % 28 121/60 -- -- KLB   11/12/24 1745 -- 118 91 % 24 117/73 -- -- KLB   11/12/24 1730 -- 116 92 % 23 88/50 -- -- KLB   11/12/24 1720 -- -- -- -- 92/55 -- -- KLB   11/12/24 1710 -- -- -- -- 85/49 -- -- KLB   11/12/24 1700 -- 115 91 % 24 76/52 -- -- KLB   11/12/24 1650 -- -- 91 % -- 84/46 -- -- KLB   11/12/24 1645 -- 113 91 % 26 86/62 -- -- KLB   11/12/24 1630 -- 119 92 % 24 103/68 No Pain -- KLB   11/12/24 1600 -- 119 90 % 23 115/55 -- -- KLB   11/12/24 1530 -- 124 95 % 28 113/59 -- -- KLB   11/12/24 1415 -- 115 94 % 25 104/62 -- -- KLB   11/12/24 1330 -- 126 96 % 25 112/58 -- -- KLB   11/12/24 1315 -- 120 94 % 22 127/64 -- -- KLB   11/12/24 1300 -- 125 96 % 23 111/60 -- -- KLB   11/12/24 1230 -- 125 95 % 21 113/59 -- -- KLB   11/12/24 1200 -- 128 93 % 22 123/71 -- -- KLB   11/12/24 1145 -- 131 95 % 22 118/57 -- -- KLB   11/12/24 1115 -- 127 95 % 20 123/64 -- -- KLB   11/12/24 1100 -- 127 96 % 21 131/63 -- -- KLB   11/12/24 1030 -- 121 96 % 17 147/68 -- -- RR   11/12/24 1000 -- 119 96 % 24 160/71 -- -- RR    11/12/24 0955 -- 121 96 % 20 -- -- -- RR   11/12/24 0950 -- 124 95 % 18 -- -- -- RR   11/12/24 0945 -- 119 92 % 10 159/77 -- -- RR   11/12/24 0940 -- 119 92 % 26 -- -- -- RR   11/12/24 0935 -- 112 92 % 34 -- -- -- RR   11/12/24 0930 -- 115 89 % 25 134/72 -- -- RR   11/12/24 0925 -- 105 89 % 15 -- -- -- RR   11/12/24 0920 -- 109 92 % 26 -- -- -- RR   11/12/24 0915 -- 107 91 % 23 141/84 -- -- RR   11/12/24 0910 -- 101 90 % 20 -- -- -- RR   11/12/24 0905 -- 87 92 % 18 -- -- -- RR   11/12/24 0900 -- 140 93 % 18 145/89 -- -- RR   11/12/24 0855 -- 137 93 % 33 -- -- -- RR   11/12/24 0850 -- 134 93 % 23 -- -- -- RR   11/12/24 0846 -- -- -- -- 161/70 -- -- RR   11/12/24 0845 -- 139 91 % 17 -- -- -- RR   11/12/24 0840 -- 141 92 % 21 -- -- -- RR   11/12/24 0835 -- 141 93 % 22 -- -- -- RR   11/12/24 0830 -- 141 93 % 30 164/124 No Pain 0 RR   11/12/24 0825 -- 128 96 % 26 -- -- -- RR   11/12/24 0824 -- -- -- -- 194/82 -- -- RR   11/12/24 0822 98.8 °F (37.1 °C) -- 96 % -- -- No Pain -- RR   11/12/24 0820 -- 145 98 % 15 -- -- -- RR   11/12/24 0815 -- 147 97 % 15 163/88 No Pain -- RR   11/12/24 0803 -- -- -- -- 151/70 -- -- RR            Physical Exam  Vitals and nursing note reviewed.   Constitutional:       General: She is not in acute distress.     Appearance: She is well-developed. She is ill-appearing.   HENT:      Head: Normocephalic and atraumatic.      Mouth/Throat:      Mouth: Mucous membranes are moist.   Eyes:      General: No visual field deficit or scleral icterus.     Extraocular Movements: Extraocular movements intact.      Right eye: Normal extraocular motion and no nystagmus.      Left eye: Normal extraocular motion and no nystagmus.      Conjunctiva/sclera: Conjunctivae normal.      Pupils: Pupils are equal.      Right eye: Pupil is round and reactive.      Left eye: Pupil is round and reactive.   Neck:      Meningeal: Brudzinski's sign and Kernig's sign absent.   Cardiovascular:      Rate and Rhythm: Tachycardia  present. Rhythm irregular.      Heart sounds: No murmur heard.     No friction rub. No gallop.   Pulmonary:      Effort: Pulmonary effort is normal. No respiratory distress.      Breath sounds: Normal breath sounds. No stridor. No wheezing, rhonchi or rales.   Chest:      Chest wall: No tenderness.   Abdominal:      General: There is no distension.      Palpations: Abdomen is soft. There is no mass.      Tenderness: There is no abdominal tenderness. There is no guarding.   Musculoskeletal:         General: No swelling.      Cervical back: Normal range of motion and neck supple. No rigidity.   Lymphadenopathy:      Cervical: No cervical adenopathy.   Skin:     General: Skin is warm and dry.      Capillary Refill: Capillary refill takes less than 2 seconds.   Neurological:      Mental Status: She is lethargic.      GCS: GCS eye subscore is 2. GCS verbal subscore is 1. GCS motor subscore is 4.      Cranial Nerves: No cranial nerve deficit or facial asymmetry.      Motor: No weakness.         Results Reviewed       Procedure Component Value Units Date/Time    Urine culture [[id number]]  (Abnormal) Collected: 11/12/24 2039    Lab Status: Preliminary result Specimen: Urine, Indwelling Stringer Catheter Updated: 11/13/24 2134     Urine Culture >100,000 cfu/ml Escherichia coli    Procalcitonin [[id number]]  (Abnormal) Collected: 11/12/24 2132    Lab Status: Final result Specimen: Blood from Arm, Right Updated: 11/12/24 2201     Procalcitonin 0.36 ng/ml     Lactic acid, plasma (w/reflex if result > 2.0) [[id number]]  (Abnormal) Collected: 11/12/24 2132    Lab Status: Final result Specimen: Blood from Arm, Right Updated: 11/12/24 2153     LACTIC ACID 3.1 mmol/L     Narrative:      Result may be elevated if tourniquet was used during collection.    Urine Microscopic [[id number]]  (Abnormal) Collected: 11/12/24 2039    Lab Status: Final result Specimen: Urine, Indwelling Stringer Catheter Updated: 11/12/24 2101     RBC, UA 4-10 /hpf       WBC, UA 10-20 /hpf      Epithelial Cells Occasional /hpf      Bacteria, UA Innumerable /hpf     UA w Reflex to Microscopic w Reflex to Culture [[id number]]  (Abnormal) Collected: 11/12/24 2039    Lab Status: Final result Specimen: Urine, Indwelling Stringer Catheter Updated: 11/12/24 2046     Color, UA Yellow     Clarity, UA Slightly Cloudy     Specific Gravity, UA 1.015     pH, UA 7.5     Leukocytes, UA Negative     Nitrite, UA Negative     Protein, UA 2+ mg/dl      Glucose, UA Negative mg/dl      Ketones, UA Negative mg/dl      Urobilinogen, UA 0.2 E.U./dl      Bilirubin, UA Negative     Occult Blood, UA 3+    HS Troponin I 4hr [[id number]]  (Abnormal) Collected: 11/12/24 1225    Lab Status: Final result Specimen: Blood from Arm, Right Updated: 11/12/24 1258     hs TnI 4hr 843 ng/L      Delta 4hr hsTnI 789 ng/L     HS Troponin I 2hr [[id number]]  (Abnormal) Collected: 11/12/24 1046    Lab Status: Final result Specimen: Blood from Arm, Right Updated: 11/12/24 1113     hs TnI 2hr 470 ng/L      Delta 2hr hsTnI 416 ng/L     Basic metabolic panel [[id number]]  (Abnormal) Collected: 11/12/24 0817    Lab Status: Final result Specimen: Blood from Arm, Left Updated: 11/12/24 0853     Sodium 134 mmol/L      Potassium 4.7 mmol/L      Chloride 96 mmol/L      CO2 24 mmol/L      ANION GAP 14 mmol/L      BUN 24 mg/dL      Creatinine 1.70 mg/dL      Glucose 238 mg/dL      Calcium 9.7 mg/dL      eGFR 31 ml/min/1.73sq m     Narrative:      National Kid[employer] Foundation guidelines for Chronic Kidney Disease (CKD):     Stage 1 with normal or high GFR (GFR > 90 mL/min/1.73 square meters)    Stage 2 Mild CKD (GFR = 60-89 mL/min/1.73 square meters)    Stage 3A Moderate CKD (GFR = 45-59 mL/min/1.73 square meters)    Stage 3B Moderate CKD (GFR = 30-44 mL/min/1.73 square meters)    Stage 4 Severe CKD (GFR = 15-29 mL/min/1.73 square meters)    Stage 5 End Stage CKD (GFR <15 mL/min/1.73 square meters)  Note: GFR calculation is accurate only  with a steady state creatinine    HS Troponin 0hr (reflex protocol) [[id number]]  (Abnormal) Collected: 11/12/24 0817    Lab Status: Final result Specimen: Blood from Arm, Left Updated: 11/12/24 0848     hs TnI 0hr 54 ng/L     Protime-INR [[id number]]  (Abnormal) Collected: 11/12/24 0817    Lab Status: Final result Specimen: Blood from Arm, Left Updated: 11/12/24 0840     Protime 26.4 seconds      INR 2.42    Narrative:      INR Therapeutic Range    Indication                                             INR Range      Atrial Fibrillation                                               2.0-3.0  Hypercoagulable State                                    2.0.2.3  Left Ventricular Asist Device                            2.0-3.0  Mechanical Heart Valve                                  -    Aortic(with afib, MI, embolism, HF, LA enlargement,    and/or coagulopathy)                                     2.0-3.0 (2.5-3.5)     Mitral                                                             2.5-3.5  Prosthetic/Bioprosthetic Heart Valve               2.0-3.0  Venous thromboembolism (VTE: VT, PE        2.0-3.0    APTT [[id number]]  (Abnormal) Collected: 11/12/24 0817    Lab Status: Final result Specimen: Blood from Arm, Left Updated: 11/12/24 0840     PTT 47 seconds     Fingerstick Glucose (POCT) [[id number]]  (Abnormal) Collected: 11/12/24 0823    Lab Status: Final result Specimen: Blood Updated: 11/12/24 0824     POC Glucose 236 mg/dl     CBC and Platelet [[id number]]  (Abnormal) Collected: 11/12/24 0817    Lab Status: Final result Specimen: Blood from Arm, Left Updated: 11/12/24 0824     WBC 14.72 Thousand/uL      RBC 4.47 Million/uL      Hemoglobin 13.8 g/dL      Hematocrit 45.8 %       fL      MCH 30.9 pg      MCHC 30.1 g/dL      RDW 15.1 %      Platelets 410 Thousands/uL      MPV 10.3 fL             XR chest 1 view portable   Final Interpretation by Symone Fonseca MD (11/13 0559)      ET tube 4 cm above the kailey.       Moderate left base opacity with loss of the left diaphragm which could be due to atelectasis or pneumonia in the appropriate clinical setting.            Workstation performed: [id number]         MRI brain wo contrast   Final Interpretation by E. Alec Schoenberger, MD (11/12 4585)   Limited by motion.   Mild restricted diffusion with abnormal FLAIR signal in the mesial left temporal lobe could be postictal. Recommend correlation with CSF to exclude infection such as HSV.   Abnormal signal in the right thalamus of uncertain etiology.   Recommend repeat MRI with contrast.      The study was marked in EPIC for immediate notification.         Workstation performed: [id number]         CTA stroke alert (head/neck)   Final Interpretation by Daniel Reinoso MD (11/12 0825)      No large vessel occlusion, high-grade stenosis, or intracranial aneurysm identified on CT angiogram of the head.      CT angiogram of the neck mild degraded by motion artifact particularly at the level of the carotid bifurcations and carotid bulbs, but there is at most approximately 40 to 50% stenosis of the bilateral cervical internal carotid arteries. No significant    vertebral artery stenosis noted. No dissection identified.      Mildly enlarged main pulmonary artery, indicative of pulmonary hypertension.         Findings were directly discussed with Americo Thompson at 8:21 a.m. on 11/12/2024.      Workstation performed: [id number]         CT stroke alert brain   Final Interpretation by Daniel Reinoso MD (11/12 0825)      No acute intracranial abnormality.      Findings were directly discussed with Americo Thompson at approximately 8:12 a.m. on 11/12/2024.      Workstation performed: [id number]         X-ray chest 1 view portable   Final Interpretation by Symone Fonseca MD (11/12 0827)      No acute disease with mild right base atelectasis.            Workstation performed: [id number]             ECG 12 Lead Documentation Only    Date/Time:  11/12/2024 8:44 AM    Performed by: Xander Sahni MD  Authorized by: Xander Sahni MD    Indications / Diagnosis:  Stroke like symptoms  ECG reviewed by me, the ED Provider: yes    Patient location:  ED and bedside  Previous ECG:     Previous ECG:  Compared to current    Similarity:  Changes noted  Interpretation:     Interpretation: abnormal    Rate:     ECG rate:  135    ECG rate assessment: tachycardic    Rhythm:     Rhythm: atrial fibrillation    Ectopy:     Ectopy: PVCs    QRS:     QRS axis:  Right  Q waves:     Q waves:  V1 and V2  Comments:      Low voltage  Intubation    Date/Time: 11/12/2024 6:50 PM    Performed by: Xander Sahni MD  Authorized by: Xander Sahni MD    Patient location:  ED and bedside  Consent:     Consent obtained:  Emergent situation    Alternatives discussed:  No treatment and delayed treatment  Universal protocol:     Procedure explained and questions answered to patient or proxy's satisfaction: yes      Relevant documents present and verified: yes      Test results available and properly labeled: yes      Radiology Images displayed and confirmed.  If images not available, report reviewed: yes      Required blood products, implants, devices, and special equipment available: yes      Site/side marked: yes      Immediately prior to procedure, a time out was called: yes    Pre-procedure details:     Patient status:  Unresponsive    Mallampati score:  3    Pretreatment medications:  Etomidate    Paralytics:  Rocuronium  Indications:     Indications for intubation: respiratory distress, airway protection and hypoxemia    Procedure details:     Preoxygenation:  Bag valve mask    CPR in progress: no      Intubation method:  Oral    Oral intubation technique:  Glidescope    Laryngoscope blade:  Mac 3    Tube size (mm):  7.5    Tube type:  Cuffed    Number of attempts:  1    Cricoid pressure: no      Tube visualized through cords: yes    Placement assessment:     ETT to  lip:  23    Tube secured with:  Adhesive tape and ETT hernandez    Breath sounds:  Equal    Placement verification: CXR verification, equal breath sounds and ETCO2 detector      CXR findings:  ETT in proper place  Post-procedure details:     Patient tolerance of procedure:  Tolerated well, no immediate complications  CriticalCare Time    Date/Time: 11/13/2024 10:52 AM    Performed by: Xander Sahni MD  Authorized by: Xander Sahni MD    Critical care provider statement:     Critical care time (minutes):  150    Critical care start time:  11/12/2024 7:22 AM    Critical care end time:  11/12/2024 7:23 PM    Critical care time was exclusive of:  Separately billable procedures and treating other patients    Critical care was necessary to treat or prevent imminent or life-threatening deterioration of the following conditions:  CNS failure or compromise (status eplipeticus)    Critical care was time spent personally by me on the following activities:  Blood draw for specimens, obtaining history from patient or surrogate, development of treatment plan with patient or surrogate, discussions with consultants, discussions with primary provider, evaluation of patient's response to treatment, examination of patient, ordering and performing treatments and interventions, ordering and review of laboratory studies, ordering and review of radiographic studies, re-evaluation of patient's condition, review of old charts and ventilator management      ED Medication and Procedure Management   Prior to Admission Medications   Prescriptions Last Dose Informant Patient Reported? Taking?   Blood Glucose Monitoring Suppl (Embrace Pro Glucose Meter) SAVANNAH  Self No No   Sig: Use 2 (two) times a day Embrace glucometer, test twice daily   Embrace Lancets Ultra Thin 30G MISC  Self No No   Sig: Use 2 (two) times a day   Patient not taking: Reported on 7/19/2023   LORazepam (ATIVAN) 0.5 mg tablet   No No   Sig: Take 1 tablet (0.5 mg total)  by mouth 2 (two) times a day as needed for anxiety   albuterol (2.5 mg/3 mL) 0.083 % nebulizer solution   No No   Sig: Take 3 mL (2.5 mg total) by nebulization every 6 (six) hours as needed for wheezing or shortness of breath   albuterol (PROVENTIL HFA,VENTOLIN HFA) 90 mcg/act inhaler   No No   Sig: Inhale 1 puff every 4 (four) hours as needed for wheezing   amLODIPine (NORVASC) 5 mg tablet  Self No No   Sig: take 1 tablet by mouth once daily   Patient not taking: Reported on 2/29/2024   atorvastatin (LIPITOR) 10 mg tablet   No No   Sig: Take 1 tablet (10 mg total) by mouth daily   cholecalciferol (VITAMIN D3) 400 units tablet   No No   Sig: Take 1 tablet (400 Units total) by mouth daily   collagenase (SANTYL) ointment  Self No No   Sig: Apply topically daily   Patient not taking: Reported on 5/26/2023   famotidine (PEPCID) 20 mg tablet   No No   Sig: Take 1 tablet (20 mg total) by mouth daily   ferrous sulfate 325 (65 Fe) mg tablet   No No   Sig: Take 1 tablet (325 mg total) by mouth daily with breakfast Every other day   furosemide (LASIX) 40 mg tablet  Self No No   Sig: Take 1 tablet (40 mg total) by mouth daily   Patient taking differently: Take 40 mg by mouth daily And and as needed dose   glucose blood (Embrace Blood Glucose Test) test strip  Self No No   Sig: Use as instructed to check sugar bid   Patient not taking: Reported on 5/26/2023   hydrOXYzine HCL (ATARAX) 50 mg tablet   No No   Sig: Take 1 tablet (50 mg total) by mouth daily at bedtime   Patient not taking: Reported on 8/27/2024   ipratropium (ATROVENT) 0.02 % nebulizer solution   No No   Sig: USE 1 VIAL IN NEBULIZER 4 TIMES DAILY   ipratropium-albuterol (DUO-NEB) 0.5-2.5 mg/3 mL nebulizer solution   No No   Sig: Take 3 mL by nebulization every 8 (eight) hours as needed for wheezing or shortness of breath   levothyroxine 100 mcg tablet   No No   Sig: Take 1 tablet (100 mcg total) by mouth daily   metoprolol tartrate (LOPRESSOR) 50 mg tablet   No No    Sig: Take 1 tablet (50 mg total) by mouth 2 (two) times a day   nystatin (MYCOSTATIN) cream   No No   Sig: Apply topically 2 (two) times a day   pantoprazole (PROTONIX) 40 mg tablet   No No   Sig: Take 1 tablet (40 mg total) by mouth daily   sertraline (ZOLOFT) 50 mg tablet   No No   Sig: Take 1 tablet (50 mg total) by mouth daily   triamcinolone (KENALOG) 0.1 % ointment   No No   Sig: APPLY TO AFFECTED AREA TWICE A DAY   warfarin (COUMADIN) 3 mg tablet   No No   Sig: Take 1 tablet (3 mg total) by mouth daily   warfarin (Coumadin) 1 mg tablet  Self No No   Sig: Take 1 tablet (1 mg total) by mouth daily   Patient taking differently: Take 1 mg by mouth daily Monday,Wednesday, Friday 3 mg Sunday, Saturday, Tuesday, Thursday 1 mg      Facility-Administered Medications: None     Discharge Medication List as of 11/12/2024  9:54 PM        CONTINUE these medications which have NOT CHANGED    Details   albuterol (2.5 mg/3 mL) 0.083 % nebulizer solution Take 3 mL (2.5 mg total) by nebulization every 6 (six) hours as needed for wheezing or shortness of breath, Starting Wed 9/4/2024, Normal      albuterol (PROVENTIL HFA,VENTOLIN HFA) 90 mcg/act inhaler Inhale 1 puff every 4 (four) hours as needed for wheezing, Starting Fri 9/13/2024, Normal      amLODIPine (NORVASC) 5 mg tablet take 1 tablet by mouth once daily, Normal      atorvastatin (LIPITOR) 10 mg tablet Take 1 tablet (10 mg total) by mouth daily, Starting Fri 8/23/2024, Normal      Blood Glucose Monitoring Suppl (Embrace Pro Glucose Meter) SAVANNAH Use 2 (two) times a day Embrace glucometer, test twice daily, Starting Tue 11/30/2021, Normal      cholecalciferol (VITAMIN D3) 400 units tablet Take 1 tablet (400 Units total) by mouth daily, Starting Mon 11/20/2023, Normal      collagenase (SANTYL) ointment Apply topically daily, Starting Wed 10/26/2022, Normal      Embrace Lancets Ultra Thin 30G MISC Use 2 (two) times a day, Starting Mon 3/20/2023, Normal      famotidine  (PEPCID) 20 mg tablet Take 1 tablet (20 mg total) by mouth daily, Starting Thu 4/25/2024, Normal      ferrous sulfate 325 (65 Fe) mg tablet Take 1 tablet (325 mg total) by mouth daily with breakfast Every other day, Starting Tue 3/26/2024, Normal      furosemide (LASIX) 40 mg tablet Take 1 tablet (40 mg total) by mouth daily, Starting Wed 2/15/2023, Until Mon 11/4/2024, Normal      glucose blood (Embrace Blood Glucose Test) test strip Use as instructed to check sugar bid, Normal      hydrOXYzine HCL (ATARAX) 50 mg tablet Take 1 tablet (50 mg total) by mouth daily at bedtime, Starting Wed 9/20/2023, Normal      ipratropium (ATROVENT) 0.02 % nebulizer solution USE 1 VIAL IN NEBULIZER 4 TIMES DAILY, Normal      ipratropium-albuterol (DUO-NEB) 0.5-2.5 mg/3 mL nebulizer solution Take 3 mL by nebulization every 8 (eight) hours as needed for wheezing or shortness of breath, Starting Wed 7/26/2023, Normal      levothyroxine 100 mcg tablet Take 1 tablet (100 mcg total) by mouth daily, Starting Fri 8/23/2024, Normal      LORazepam (ATIVAN) 0.5 mg tablet Take 1 tablet (0.5 mg total) by mouth 2 (two) times a day as needed for anxiety, Starting Mon 11/4/2024, Normal      metoprolol tartrate (LOPRESSOR) 50 mg tablet Take 1 tablet (50 mg total) by mouth 2 (two) times a day, Starting Thu 4/25/2024, Normal      nystatin (MYCOSTATIN) cream Apply topically 2 (two) times a day, Starting Mon 9/18/2023, Normal      pantoprazole (PROTONIX) 40 mg tablet Take 1 tablet (40 mg total) by mouth daily, Starting Thu 4/25/2024, Normal      sertraline (ZOLOFT) 50 mg tablet Take 1 tablet (50 mg total) by mouth daily, Starting Thu 4/25/2024, Normal      triamcinolone (KENALOG) 0.1 % ointment APPLY TO AFFECTED AREA TWICE A DAY, Starting Tue 10/8/2024, Normal      !! warfarin (Coumadin) 1 mg tablet Take 1 tablet (1 mg total) by mouth daily, Starting Fri 5/26/2023, Normal      !! warfarin (COUMADIN) 3 mg tablet Take 1 tablet (3 mg total) by mouth daily,  Starting Thu 4/25/2024, Normal       !! - Potential duplicate medications found. Please discuss with provider.        No discharge procedures on file.  ED SEPSIS DOCUMENTATION   Time reflects when diagnosis was documented in both MDM as applicable and the Disposition within this note       Time User Action Codes Description Comment    11/12/2024  7:54 AM Xander Sahni Add [R29.90] Stroke-like symptom     11/12/2024  9:30 AM Xander Sahni Add [G40.909] Seizure disorder (HCC)     11/12/2024  9:30 AM Xander Sahni Add [R41.82] Altered mental status, unspecified altered mental status type     11/12/2024  1:29 PM Xander Sahni Add [I48.91] Atrial fibrillation, unspecified type (HCC)     11/12/2024  1:29 PM Xander Sahni Add [R79.89] Elevated troponin     11/12/2024  6:46 PM Xander Sahni Add [G40.901] Status epilepticus (HCC)     11/13/2024 10:55 AM Xander Sahni Add [G04.90] Encephalitis                  Xander Sahni MD  11/14/24 0945

## 2024-11-12 NOTE — ED NOTES
Per family, seizure-like activity noted, SURINDER Smith at bedside, per Dr. Sahni, give 2mg IV Lorazepam, see override medication order     Samuel Brizuela RN  11/12/24 8253

## 2024-11-12 NOTE — TELEPHONE ENCOUNTER
Constanza called to melissa Blood know Adelina is in the [facility] er   she has had 2 seizures

## 2024-11-12 NOTE — ED NOTES
Focal seizure observed affecting her face twitching and gaze to right side lasting approx 20 sec followed by decreased level of consciousness.  Provider at bedside.    Another 10sec seizure observed shortly thereafter     Ana Arriola RN  11/12/24 1218

## 2024-11-12 NOTE — TELEMEDICINE
TeleConsultation - Stroke   Adelina Soto 64 y.o. female MRN: [id number]  Unit/Bed#: ED 10 Encounter: [id number]      VIRTUAL CARE DOCUMENTATION:     1. This service was provided via Telemedicine using [employer]     2. Parties in the room with patient during teleconsult Family member: sister     3. Confidentiality My office door was closed     4. Participants No one else was in the room    5. Patient acknowledged consent and understanding of privacy and security of the  Telemedicine consult. I informed the patient that I have reviewed their record in Epic and presented the opportunity for them to ask any questions regarding the visit today.  The patient agreed to participate.    6. Time spent 50 minutes       Assessment & Plan   Assessment:   - Adelina Soto is a 64 year old woman with PMHx including HTN, HLD, hypothyroidism, A fib on Warfarin, DM, GERD, anxiety who presented to the hospital for evaluation of altered mental status. Stroke alert called due to gaze deviation and altered mentation. Pt was noted to have two witnessed seizures prior to arrival, and received 6 mg Versed prior to arrival. Continued to have left gaze preference for ED, with lack of command following although was moving her LUE spontaneously. CT head and CTA H/N with no findings that could explain her presentation. After return from CT scan, appeared to have some improvement in her exam but still off from her baseline on discussion with sister at bedside. Concern for status epilepticus with multiple seizures without return to baseline as of yet. Otherwise compliant with Coumadin, with INR 2.42 today. Presentation appears to fit more with seizure, although would still recommend MRI brain for evaluation in light of stroke risk factors. No history of seizures. Will plan to start Keppra at this time. Discussed case with EMU for transfer for video EEG. Will plan for transfer to [place] for monitoring, can adjust  anti-seizure medications as needed based on video EEG.     TPA Decision: Patient not a candidate. Unclear time of onset outside appropriate time window. and Bleeding risk.    Not an endovascular candidate since no LVO/IR target identified.     Plan:   - MRI brain seizure protocol. Can order now while awaiting transfer, although discussed with ED that this should not delay transfer for video EEG.   - Load with Keppra 3g once, then start Keppra 750 mg [id number] tonight  - video EEG at the [facility]  - Seizure precautions  - Telemetry  - Seizure precautions outpatient: no driving, no operating heavy machinery, no swimming alone, no climbing, no baths only showers, no cooking alone, or any activity that would put them at increased risk of harm if they were to have a seizure.  - Will need to be reported to the DMV  - Check UA  - Goal of normothermia and euglycemia  - Therapy when able  - Continue home Warfarin  - Systolic BP goal less than 180  - Continue to monitor closely for changes in examination, notify Neurology if so  - Rest of care per primary    Recommendations for outpatient neurological follow up have yet to be determined.    History of Present Illness   Reason for Consult / Principal Problem: gaze deviation, altered mental status  Hx and PE limited by: telemedicine, pt's mental status  Patient last known well: last night, unclear exactly what time but around 2030  Stroke alert called: 0754  Neurology time of arrival: immediate over phone, then seen via telemedicine after return from CT scan  HPI: Adelina Soto is a 64 y.o. woman with PMHx including HTN, HLD, hypothyroidism, A fib on Warfarin, DM, GERD, anxiety who presented to the hospital for evaluation of altered mental status.     Pt last seen at her usual self prior to bed by her sister around 2030. She was found this morning around 0530 by her sister very confused, unable to look to the right. She was witnessed to have two seizures described  as GTCs, one at home and one with EMS, without return to baseline. She was given a total of 6 mg Versed prior to arrival. In the ED, she was noted to have a L gaze preference, unable to look to the right, moving her left arm spontaneously but otherwise not moving her other extremities. Compliant with her Coumadin and other medications at home. No history of seizures per sister. On evaluation via telemedicine after return from CT scan, pt remained altered with left gaze preference, although during conversation she slowly began to look to the right and around the room. Remained confused, not answering questions or following commands. Moving both upper extremities spontaneously but not moving the lower extremities. Still not at her baseline. Sister did not have any other questions/concerns.      Consult to Neurology  Consult performed by: Americo Thompson MD  Consult ordered by: Xander Sahni MD        Review of Systems  Limited due to mental status    Historical Information   Past Medical History:   Diagnosis Date    Anemia     Arthritis     Cancer of kidney (HCC)     Chronic kidney disease     Diabetes mellitus (HCC)     Disease of thyroid gland     HLD (hyperlipidemia)     Hypertension     Ovarian cancer (HCC)     Pneumonia      Past Surgical History:   Procedure Laterality Date    CHOLECYSTECTOMY      CT GUIDED PERC DRAINAGE CATHETER PLACEMENT  5/16/2016    GALLBLADDER SURGERY  05/01/2004    HYSTERECTOMY  06/01/2018    NEPHRECTOMY Right 08/02/2018     Social History   Social History     Substance and Sexual Activity   Alcohol Use Never    Comment: n/a     Social History     Substance and Sexual Activity   Drug Use Yes    Types: Marijuana    Comment: smokes with girlfriend when anxious     E-Cigarette/Vaping    E-Cigarette Use Never User      E-Cigarette/Vaping Substances    Nicotine No     THC Yes     CBD No     Flavoring No      Social History     Tobacco Use   Smoking Status Former    Current packs/day: 0.00     Average packs/day: 3.0 packs/day for 30.0 years (90.0 ttl pk-yrs)    Types: Cigarettes    Start date: 10/22/1980    Quit date: 10/22/2010    Years since quittin.0   Smokeless Tobacco Former    Quit date: 2011   Tobacco Comments    quit approx. 9 years ago     Meds/Allergies   current meds:   Current Facility-Administered Medications:     levETIRAcetam (KEPPRA) injection 3,000 mg, Once and PTA meds:   Prior to Admission Medications   Prescriptions Last Dose Informant Patient Reported? Taking?   Blood Glucose Monitoring Suppl (Embrace Pro Glucose Meter) SAVANNAH  Self No No   Sig: Use 2 (two) times a day Embrace glucometer, test twice daily   Embrace Lancets Ultra Thin 30G MISC  Self No No   Sig: Use 2 (two) times a day   Patient not taking: Reported on 2023   LORazepam (ATIVAN) 0.5 mg tablet   No No   Sig: Take 1 tablet (0.5 mg total) by mouth 2 (two) times a day as needed for anxiety   albuterol (2.5 mg/3 mL) 0.083 % nebulizer solution   No No   Sig: Take 3 mL (2.5 mg total) by nebulization every 6 (six) hours as needed for wheezing or shortness of breath   albuterol (PROVENTIL HFA,VENTOLIN HFA) 90 mcg/act inhaler   No No   Sig: Inhale 1 puff every 4 (four) hours as needed for wheezing   amLODIPine (NORVASC) 5 mg tablet  Self No No   Sig: take 1 tablet by mouth once daily   Patient not taking: Reported on 2024   atorvastatin (LIPITOR) 10 mg tablet   No No   Sig: Take 1 tablet (10 mg total) by mouth daily   cholecalciferol (VITAMIN D3) 400 units tablet   No No   Sig: Take 1 tablet (400 Units total) by mouth daily   collagenase (SANTYL) ointment  Self No No   Sig: Apply topically daily   Patient not taking: Reported on 2023   famotidine (PEPCID) 20 mg tablet   No No   Sig: Take 1 tablet (20 mg total) by mouth daily   ferrous sulfate 325 (65 Fe) mg tablet   No No   Sig: Take 1 tablet (325 mg total) by mouth daily with breakfast Every other day   furosemide (LASIX) 40 mg tablet  Self No No   Sig:  Take 1 tablet (40 mg total) by mouth daily   Patient taking differently: Take 40 mg by mouth daily And and as needed dose   glucose blood (Embrace Blood Glucose Test) test strip  Self No No   Sig: Use as instructed to check sugar bid   Patient not taking: Reported on 5/26/2023   hydrOXYzine HCL (ATARAX) 50 mg tablet   No No   Sig: Take 1 tablet (50 mg total) by mouth daily at bedtime   Patient not taking: Reported on 8/27/2024   ipratropium (ATROVENT) 0.02 % nebulizer solution   No No   Sig: USE 1 VIAL IN NEBULIZER 4 TIMES DAILY   ipratropium-albuterol (DUO-NEB) 0.5-2.5 mg/3 mL nebulizer solution   No No   Sig: Take 3 mL by nebulization every 8 (eight) hours as needed for wheezing or shortness of breath   levothyroxine 100 mcg tablet   No No   Sig: Take 1 tablet (100 mcg total) by mouth daily   metoprolol tartrate (LOPRESSOR) 50 mg tablet   No No   Sig: Take 1 tablet (50 mg total) by mouth 2 (two) times a day   nystatin (MYCOSTATIN) cream   No No   Sig: Apply topically 2 (two) times a day   pantoprazole (PROTONIX) 40 mg tablet   No No   Sig: Take 1 tablet (40 mg total) by mouth daily   sertraline (ZOLOFT) 50 mg tablet   No No   Sig: Take 1 tablet (50 mg total) by mouth daily   triamcinolone (KENALOG) 0.1 % ointment   No No   Sig: APPLY TO AFFECTED AREA TWICE A DAY   warfarin (COUMADIN) 3 mg tablet   No No   Sig: Take 1 tablet (3 mg total) by mouth daily   warfarin (Coumadin) 1 mg tablet  Self No No   Sig: Take 1 tablet (1 mg total) by mouth daily   Patient taking differently: Take 1 mg by mouth daily Monday,Wednesday, Friday 3 mg Sunday, Saturday, Tuesday, Thursday 1 mg      Facility-Administered Medications: None     No Known Allergies    Objective   Vitals:not currently breastfeeding.,There is no height or weight on file to calculate BMI.  No intake or output data in the 24 hours ending 11/12/24 0802    Invasive Devices:   Invasive Devices       None                 Physical Exam  Modified physical examination as  this is a video consultation:    Gen: NAD.  HEENT: NC/AT, no septal deviation, EOMI  Resp: Symmetric chest rise and patient in no obvious respiratory distress  Skin: No rash noted in visualized portion of this exam     Neurologic exam:  Mental status: appears awake but does not answer questions or follow commands. Appeared to have a left gaze preference but during encounter began to look to the right as well and around the room.   Cranial nerves: initially not looking past midline but later observed to be moving eyes in horizontal plane, no obvious facial asymmetry, does not protrude tongue when asked  Motor: moving both upper extremities spontaneously with no obvious focal weakness, no obvious movement of the lower extremities  Sensation: limited due to mental status  Cerebellar: limited due to mental status  Gait: deferred    NIHSS:  1a.Level of Consciousness: 2 = Not alert, requires repeated stimulation to attend   1b. LOC Questions: 2 = Answers neither correctly   1c. LOC Commands: 2 = Obeys neither correctly   2. Best Gaze: 1 = Partial Gaze Palsy   3. Visual: 0 = No visual field loss   4. Facial Palsy: 0=Normal symmetric movement   5a. Motor Right Arm: 2=Some effort against gravity, limb cannot get to or maintain (if cured) 90 (or 45) degrees, drifts down to bed, but has some effort against gravity   5b. Motor Left Arm: 2=Some effort against gravity, limb cannot get to or maintain (if cured) 90 (or 45) degrees, drifts down to bed, but has some effort against gravity   6a. Motor Right Le=No movement   6b. Motor Left Le=No movement   7. Limb Ataxia:  0=Absent   8. Sensory: 0=Normal; no sensory loss   9. Best Language:  3=Mute, global aphasia; no usable speech or auditory comprehension   10. Dysarthria: 2=Severe; patient speech is so slurred as to be unintelligible in the absence of or our of proportion to any dysphagia, or is mute/anarthric   11. Extinction and Inattention (formerly Neglect): 2=Profound  "russell-inattention or russell-inattention to more than one modality. Does not recognize own hand or orients only to one side of space   Total Score: 26   Time NIHSS was completed: 0848  Additional note: limited evaluation due to mental status    Modified Mellott Score:  2 (Unable to carry out all previous activities, but able to look after own affairs without assistance)    Lab Results: CBC:   Results from last 7 days   Lab Units 11/12/24  0817   WBC Thousand/uL 14.72*   RBC Million/uL 4.47   HEMOGLOBIN g/dL 13.8   HEMATOCRIT % 45.8   MCV fL 103*   PLATELETS Thousands/uL 410*   , BMP/CMP:   Results from last 7 days   Lab Units 11/12/24  0817   SODIUM mmol/L 134*   POTASSIUM mmol/L 4.7   CHLORIDE mmol/L 96   CO2 mmol/L 24   BUN mg/dL 24   CREATININE mg/dL 1.70*   CALCIUM mg/dL 9.7   EGFR ml/min/1.73sq m 31   , HgBA1C:   , TSH:   , Coagulation:   Results from last 7 days   Lab Units 11/12/24  0817   INR  2.42*   , Lipid Profile:   , Urinalysis:       Invalid input(s): \"URIBILINOGEN\"  Imaging Studies: Results Review Statement: I reviewed radiology reports from this admission including: CT head and CT angiogram head/neck. Reviewed with radiology.    Counseling / Coordination of Care  Total Critical Care time spent 35 minutes excluding procedures, teaching and family updates.      "

## 2024-11-13 ENCOUNTER — APPOINTMENT (INPATIENT)
Dept: NEUROLOGY | Facility: CLINIC | Age: 64
DRG: 097 | End: 2024-11-13
Payer: COMMERCIAL

## 2024-11-13 ENCOUNTER — APPOINTMENT (INPATIENT)
Dept: RADIOLOGY | Facility: HOSPITAL | Age: 64
DRG: 097 | End: 2024-11-13
Payer: COMMERCIAL

## 2024-11-13 ENCOUNTER — APPOINTMENT (INPATIENT)
Dept: NON INVASIVE DIAGNOSTICS | Facility: HOSPITAL | Age: 64
DRG: 097 | End: 2024-11-13
Payer: COMMERCIAL

## 2024-11-13 ENCOUNTER — APPOINTMENT (INPATIENT)
Dept: GASTROENTEROLOGY | Facility: HOSPITAL | Age: 64
DRG: 097 | End: 2024-11-13
Payer: COMMERCIAL

## 2024-11-13 PROBLEM — R56.9 SEIZURE (HCC): Status: ACTIVE | Noted: 2024-11-13

## 2024-11-13 LAB
2HR DELTA HS TROPONIN: 230 NG/L
2HR DELTA HS TROPONIN: 77 NG/L
4HR DELTA HS TROPONIN: 111 NG/L
4HR DELTA HS TROPONIN: 28 NG/L
ABO GROUP BLD BPU: NORMAL
ALBUMIN SERPL BCG-MCNC: 2.9 G/DL (ref 3.5–5)
ALP SERPL-CCNC: 102 U/L (ref 34–104)
ALT SERPL W P-5'-P-CCNC: 10 U/L (ref 7–52)
ANION GAP SERPL CALCULATED.3IONS-SCNC: 11 MMOL/L (ref 4–13)
ANION GAP SERPL CALCULATED.3IONS-SCNC: 12 MMOL/L (ref 4–13)
ANION GAP SERPL CALCULATED.3IONS-SCNC: 12 MMOL/L (ref 4–13)
AORTIC ROOT: 2.8 CM
APPEARANCE CSF: NORMAL
APTT PPP: 31 SECONDS (ref 23–34)
APTT PPP: 57 SECONDS (ref 23–34)
ARTERIAL PATENCY WRIST A: YES
ARTERIAL PATENCY WRIST A: YES
ASCENDING AORTA: 3.2 CM
AST SERPL W P-5'-P-CCNC: 28 U/L (ref 13–39)
ATRIAL RATE: 107 BPM
ATRIAL RATE: 108 BPM
ATRIAL RATE: 115 BPM
ATRIAL RATE: 118 BPM
BASE EX.OXY STD BLDV CALC-SCNC: 80.9 % (ref 60–80)
BASE EX.OXY STD BLDV CALC-SCNC: 87.3 % (ref 60–80)
BASE EXCESS BLDA CALC-SCNC: -0.7 MMOL/L
BASE EXCESS BLDA CALC-SCNC: -5.6 MMOL/L
BASE EXCESS BLDV CALC-SCNC: -1 MMOL/L
BASE EXCESS BLDV CALC-SCNC: -5.3 MMOL/L
BASOPHILS # BLD AUTO: 0.03 THOUSANDS/ÂΜL (ref 0–0.1)
BASOPHILS # BLD AUTO: 0.05 THOUSANDS/ÂΜL (ref 0–0.1)
BASOPHILS NFR BLD AUTO: 0 % (ref 0–1)
BASOPHILS NFR BLD AUTO: 0 % (ref 0–1)
BILIRUB SERPL-MCNC: 0.31 MG/DL (ref 0.2–1)
BODY TEMPERATURE: 96.1 DEGREES FEHRENHEIT
BODY TEMPERATURE: 97.7 DEGREES FEHRENHEIT
BPU ID: NORMAL
BSA FOR ECHO PROCEDURE: 2.13 M2
BUN SERPL-MCNC: 22 MG/DL (ref 5–25)
BUN SERPL-MCNC: 22 MG/DL (ref 5–25)
BUN SERPL-MCNC: 27 MG/DL (ref 5–25)
C GATTII+NEOFOR DNA CSF QL NAA+NON-PROBE: NOT DETECTED
CA-I BLD-SCNC: 1.13 MMOL/L (ref 1.12–1.32)
CA-I BLD-SCNC: 1.17 MMOL/L (ref 1.12–1.32)
CALCIUM ALBUM COR SERPL-MCNC: 9.6 MG/DL (ref 8.3–10.1)
CALCIUM SERPL-MCNC: 8.4 MG/DL (ref 8.4–10.2)
CALCIUM SERPL-MCNC: 8.7 MG/DL (ref 8.4–10.2)
CALCIUM SERPL-MCNC: 8.9 MG/DL (ref 8.4–10.2)
CARDIAC TROPONIN I PNL SERPL HS: 1162 NG/L (ref ?–50)
CARDIAC TROPONIN I PNL SERPL HS: 1190 NG/L (ref ?–50)
CARDIAC TROPONIN I PNL SERPL HS: 1239 NG/L (ref ?–50)
CARDIAC TROPONIN I PNL SERPL HS: 1657 NG/L (ref ?–50)
CARDIAC TROPONIN I PNL SERPL HS: 1768 NG/L (ref ?–50)
CARDIAC TROPONIN I PNL SERPL HS: 1887 NG/L (ref ?–50)
CHLORIDE SERPL-SCNC: 103 MMOL/L (ref 96–108)
CHLORIDE SERPL-SCNC: 106 MMOL/L (ref 96–108)
CHLORIDE SERPL-SCNC: 107 MMOL/L (ref 96–108)
CHOLEST SERPL-MCNC: 127 MG/DL (ref ?–200)
CK SERPL-CCNC: 1067 U/L (ref 26–192)
CK SERPL-CCNC: 2460 U/L (ref 26–192)
CMV DNA CSF QL NAA+NON-PROBE: NOT DETECTED
CO2 SERPL-SCNC: 23 MMOL/L (ref 21–32)
CO2 SERPL-SCNC: 23 MMOL/L (ref 21–32)
CO2 SERPL-SCNC: 25 MMOL/L (ref 21–32)
CREAT SERPL-MCNC: 1.81 MG/DL (ref 0.6–1.3)
CREAT SERPL-MCNC: 1.82 MG/DL (ref 0.6–1.3)
CREAT SERPL-MCNC: 1.84 MG/DL (ref 0.6–1.3)
E COLI K1 DNA CSF QL NAA+NON-PROBE: NOT DETECTED
EOSINOPHIL # BLD AUTO: 0.01 THOUSAND/ÂΜL (ref 0–0.61)
EOSINOPHIL # BLD AUTO: 0.02 THOUSAND/ÂΜL (ref 0–0.61)
EOSINOPHIL NFR BLD AUTO: 0 % (ref 0–6)
EOSINOPHIL NFR BLD AUTO: 0 % (ref 0–6)
ERYTHROCYTE [DISTWIDTH] IN BLOOD BY AUTOMATED COUNT: 15.5 % (ref 11.6–15.1)
ERYTHROCYTE [DISTWIDTH] IN BLOOD BY AUTOMATED COUNT: 15.7 % (ref 11.6–15.1)
EST. AVERAGE GLUCOSE BLD GHB EST-MCNC: 117 MG/DL
EV RNA CSF QL NAA+NON-PROBE: NOT DETECTED
FLUAV RNA RESP QL NAA+PROBE: NEGATIVE
FLUBV RNA RESP QL NAA+PROBE: NEGATIVE
FRACTIONAL SHORTENING: 32 (ref 28–44)
GFR SERPL CREATININE-BSD FRML MDRD: 28 ML/MIN/1.73SQ M
GLUCOSE CSF-MCNC: 89 MG/DL (ref 40–70)
GLUCOSE SERPL-MCNC: 112 MG/DL (ref 65–140)
GLUCOSE SERPL-MCNC: 127 MG/DL (ref 65–140)
GLUCOSE SERPL-MCNC: 133 MG/DL (ref 65–140)
GP B STREP DNA CSF QL NAA+NON-PROBE: NOT DETECTED
HAEM INFLU DNA CSF QL NAA+NON-PROBE: NOT DETECTED
HBA1C MFR BLD: 5.7 %
HCO3 BLDA-SCNC: 20.6 MMOL/L (ref 22–28)
HCO3 BLDA-SCNC: 24.9 MMOL/L (ref 22–28)
HCO3 BLDV-SCNC: 22.3 MMOL/L (ref 24–30)
HCO3 BLDV-SCNC: 24.8 MMOL/L (ref 24–30)
HCT VFR BLD AUTO: 34.9 % (ref 34.8–46.1)
HCT VFR BLD AUTO: 38 % (ref 34.8–46.1)
HDLC SERPL-MCNC: 28 MG/DL
HGB BLD-MCNC: 10.7 G/DL (ref 11.5–15.4)
HGB BLD-MCNC: 11.5 G/DL (ref 11.5–15.4)
HHV6 DNA CSF QL NAA+NON-PROBE: NOT DETECTED
HOROWITZ INDEX BLDA+IHG-RTO: 100 MM[HG]
HOROWITZ INDEX BLDA+IHG-RTO: 40 MM[HG]
HOROWITZ INDEX BLDA+IHG-RTO: 45 MM[HG]
HSV1 DNA CSF QL NAA+NON-PROBE: NOT DETECTED
HSV2 DNA CSF QL NAA+NON-PROBE: NOT DETECTED
IMM GRANULOCYTES # BLD AUTO: 0.1 THOUSAND/UL (ref 0–0.2)
IMM GRANULOCYTES # BLD AUTO: 0.21 THOUSAND/UL (ref 0–0.2)
IMM GRANULOCYTES NFR BLD AUTO: 1 % (ref 0–2)
IMM GRANULOCYTES NFR BLD AUTO: 1 % (ref 0–2)
INR PPP: 1.41 (ref 0.85–1.19)
INR PPP: 1.59 (ref 0.85–1.19)
INTERVENTRICULAR SEPTUM IN DIASTOLE (PARASTERNAL SHORT AXIS VIEW): 1.4 CM
INTERVENTRICULAR SEPTUM: 1.4 CM (ref 0.6–1.1)
L MONOCYTOG DNA CSF QL NAA+NON-PROBE: NOT DETECTED
LACTATE SERPL-SCNC: 2.5 MMOL/L (ref 0.5–2)
LACTATE SERPL-SCNC: 2.9 MMOL/L (ref 0.5–2)
LACTATE SERPL-SCNC: 3.6 MMOL/L (ref 0.5–2)
LACTATE SERPL-SCNC: 4.3 MMOL/L (ref 0.5–2)
LDLC SERPL CALC-MCNC: 39 MG/DL (ref 0–100)
LEFT ATRIUM SIZE: 4.4 CM
LEFT INTERNAL DIMENSION IN SYSTOLE: 2.8 CM (ref 2.1–4)
LEFT VENTRICULAR INTERNAL DIMENSION IN DIASTOLE: 4.1 CM (ref 3.5–6)
LEFT VENTRICULAR POSTERIOR WALL IN END DIASTOLE: 1.8 CM
LEFT VENTRICULAR STROKE VOLUME: 47 ML
LEVETIRACETAM SERPL-MCNC: 46.1 UG/ML (ref 12–46)
LVSV (TEICH): 47 ML
LYMPHOCYTES # BLD AUTO: 1.23 THOUSANDS/ÂΜL (ref 0.6–4.47)
LYMPHOCYTES # BLD AUTO: 1.57 THOUSANDS/ÂΜL (ref 0.6–4.47)
LYMPHOCYTES NFR BLD AUTO: 12 % (ref 14–44)
LYMPHOCYTES NFR BLD AUTO: 8 % (ref 14–44)
LYMPHOCYTES NFR CSF MANUAL: 7 %
MAGNESIUM SERPL-MCNC: 1.8 MG/DL (ref 1.9–2.7)
MAGNESIUM SERPL-MCNC: 3.1 MG/DL (ref 1.9–2.7)
MAGNESIUM SERPL-MCNC: 3.6 MG/DL (ref 1.9–2.7)
MCH RBC QN AUTO: 30.9 PG (ref 26.8–34.3)
MCH RBC QN AUTO: 31.3 PG (ref 26.8–34.3)
MCHC RBC AUTO-ENTMCNC: 30.3 G/DL (ref 31.4–37.4)
MCHC RBC AUTO-ENTMCNC: 30.7 G/DL (ref 31.4–37.4)
MCV RBC AUTO: 101 FL (ref 82–98)
MCV RBC AUTO: 103 FL (ref 82–98)
MONOCYTES # BLD AUTO: 1.14 THOUSAND/ÂΜL (ref 0.17–1.22)
MONOCYTES # BLD AUTO: 1.4 THOUSAND/ÂΜL (ref 0.17–1.22)
MONOCYTES NFR BLD AUTO: 9 % (ref 4–12)
MONOCYTES NFR BLD AUTO: 9 % (ref 4–12)
MONOS+MACROS CSF MANUAL: 33 %
N MEN DNA CSF QL NAA+NON-PROBE: NOT DETECTED
NEUTROPHILS # BLD AUTO: 12.32 THOUSANDS/ÂΜL (ref 1.85–7.62)
NEUTROPHILS # BLD AUTO: 9.77 THOUSANDS/ÂΜL (ref 1.85–7.62)
NEUTROPHILS NFR CSF MANUAL: 57 %
NEUTS BAND NFR CSF MANUAL: 3 %
NEUTS SEG NFR BLD AUTO: 78 % (ref 43–75)
NEUTS SEG NFR BLD AUTO: 82 % (ref 43–75)
NONHDLC SERPL-MCNC: 99 MG/DL
NRBC BLD AUTO-RTO: 0 /100 WBCS
NRBC BLD AUTO-RTO: 0 /100 WBCS
O2 CT BLDA-SCNC: 15.3 ML/DL (ref 16–23)
O2 CT BLDA-SCNC: 17.3 ML/DL (ref 16–23)
O2 CT BLDV-SCNC: 13.6 ML/DL
O2 CT BLDV-SCNC: 14.9 ML/DL
OXYHGB MFR BLDA: 95.7 % (ref 94–97)
OXYHGB MFR BLDA: 98.6 % (ref 94–97)
PARECHOVIRUS A RNA CSF QL NAA+NON-PROBE: NOT DETECTED
PCO2 BLD: 42.8 MM HG (ref 42–50)
PCO2 BLDA: 43.3 MM HG (ref 36–44)
PCO2 BLDA: 44.6 MM HG (ref 36–44)
PCO2 BLDV: 45.5 MM HG (ref 42–50)
PCO2 BLDV: 52.2 MM HG (ref 42–50)
PCO2 TEMP ADJ BLDA: 43.6 MM HG (ref 36–44)
PEEP RESPIRATORY: 10 CM[H2O]
PEEP RESPIRATORY: 8 CM[H2O]
PEEP RESPIRATORY: 8 CM[H2O]
PH BLD: 7.37 [PH] (ref 7.35–7.45)
PH BLD: 7.37 [PH] (ref 7.3–7.4)
PH BLDA: 7.3 [PH] (ref 7.35–7.45)
PH BLDA: 7.36 [PH] (ref 7.35–7.45)
PH BLDV: 7.25 [PH] (ref 7.3–7.4)
PH BLDV: 7.35 [PH] (ref 7.3–7.4)
PHOSPHATE SERPL-MCNC: 2.5 MG/DL (ref 2.3–4.1)
PHOSPHATE SERPL-MCNC: 2.6 MG/DL (ref 2.3–4.1)
PHOSPHATE SERPL-MCNC: 3 MG/DL (ref 2.3–4.1)
PLATELET # BLD AUTO: 270 THOUSANDS/UL (ref 149–390)
PLATELET # BLD AUTO: 328 THOUSANDS/UL (ref 149–390)
PMV BLD AUTO: 10 FL (ref 8.9–12.7)
PMV BLD AUTO: 9.5 FL (ref 8.9–12.7)
PO2 BLD: 318.7 MM HG (ref 75–129)
PO2 BLDA: 321.2 MM HG (ref 75–129)
PO2 BLDA: 96.1 MM HG (ref 75–129)
PO2 BLDV: 47.2 MM HG (ref 35–45)
PO2 BLDV: 62.4 MM HG (ref 35–45)
PO2 VENOUS TEMP CORRECTED: 42.8 MM HG (ref 35–45)
POTASSIUM SERPL-SCNC: 3.5 MMOL/L (ref 3.5–5.3)
POTASSIUM SERPL-SCNC: 3.8 MMOL/L (ref 3.5–5.3)
POTASSIUM SERPL-SCNC: 4.1 MMOL/L (ref 3.5–5.3)
PR INTERVAL: 0 MS
PROT CSF-MCNC: 74 MG/DL (ref 15–45)
PROT SERPL-MCNC: 6.2 G/DL (ref 6.4–8.4)
PROTHROMBIN TIME: 17.5 SECONDS (ref 12.3–15)
PROTHROMBIN TIME: 19.2 SECONDS (ref 12.3–15)
QRS AXIS: -75 DEGREES
QRS AXIS: 0 DEGREES
QRS AXIS: 180 DEGREES
QRS AXIS: 181 DEGREES
QRSD INTERVAL: 104 MS
QRSD INTERVAL: 79 MS
QRSD INTERVAL: 79 MS
QRSD INTERVAL: 83 MS
QT INTERVAL: 321 MS
QT INTERVAL: 350 MS
QT INTERVAL: 400 MS
QT INTERVAL: 404 MS
QTC INTERVAL: 431 MS
QTC INTERVAL: 485 MS
QTC INTERVAL: 500 MS
QTC INTERVAL: 529 MS
RBC # BLD AUTO: 3.46 MILLION/UL (ref 3.81–5.12)
RBC # BLD AUTO: 3.68 MILLION/UL (ref 3.81–5.12)
RBC # CSF MANUAL: 339 UL (ref 0–10)
RSV RNA RESP QL NAA+PROBE: NEGATIVE
S PNEUM DNA CSF QL NAA+NON-PROBE: NOT DETECTED
SARS-COV-2 RNA RESP QL NAA+PROBE: NEGATIVE
SL CV PED ECHO LEFT VENTRICLE DIASTOLIC VOLUME (MOD BIPLANE) 2D: 76 ML
SL CV PED ECHO LEFT VENTRICLE SYSTOLIC VOLUME (MOD BIPLANE) 2D: 29 ML
SODIUM SERPL-SCNC: 140 MMOL/L (ref 135–147)
SODIUM SERPL-SCNC: 141 MMOL/L (ref 135–147)
SODIUM SERPL-SCNC: 141 MMOL/L (ref 135–147)
SPECIMEN SOURCE: ABNORMAL
SPECIMEN SOURCE: ABNORMAL
T WAVE AXIS: 46 DEGREES
T WAVE AXIS: 94 DEGREES
T WAVE AXIS: 95 DEGREES
T WAVE AXIS: 96 DEGREES
TOTAL CELLS COUNTED BLD: NO
TOTAL CELLS COUNTED SPEC: 100
TR MAX PG: 30 MMHG
TR PEAK VELOCITY: 2.8 M/S
TRICUSPID VALVE PEAK REGURGITATION VELOCITY: 2.75 M/S
TRIGL SERPL-MCNC: 301 MG/DL (ref ?–150)
TUBE # CSF: 4
UNIT DISPENSE STATUS: NORMAL
UNIT PRODUCT CODE: NORMAL
UNIT PRODUCT VOLUME: 280 ML
UNIT PRODUCT VOLUME: 301 ML
UNIT RH: NORMAL
VALPROATE SERPL-MCNC: 119 UG/ML (ref 50–100)
VANCOMYCIN SERPL-MCNC: 23.3 UG/ML (ref 10–20)
VENT AC: 18
VENT AC: 18
VENT AC: 22
VENT- AC: AC
VENTRICULAR RATE: 105 BPM
VENTRICULAR RATE: 108 BPM
VENTRICULAR RATE: 115 BPM
VENTRICULAR RATE: 92 BPM
VT SETTING VENT: 400 ML
VT SETTING VENT: 400 ML
VT SETTING VENT: 420 ML
VZV DNA CSF QL NAA+NON-PROBE: NOT DETECTED
WBC # BLD AUTO: 12.62 THOUSAND/UL (ref 4.31–10.16)
WBC # BLD AUTO: 15.23 THOUSAND/UL (ref 4.31–10.16)
WBC # CSF AUTO: 2 /UL (ref 0–5)

## 2024-11-13 PROCEDURE — 99223 1ST HOSP IP/OBS HIGH 75: CPT | Performed by: INTERNAL MEDICINE

## 2024-11-13 PROCEDURE — 87798 DETECT AGENT NOS DNA AMP: CPT

## 2024-11-13 PROCEDURE — 83520 IMMUNOASSAY QUANT NOS NONAB: CPT | Performed by: REGISTERED NURSE

## 2024-11-13 PROCEDURE — 95715 VEEG EA 12-26HR INTMT MNTR: CPT

## 2024-11-13 PROCEDURE — 93325 DOPPLER ECHO COLOR FLOW MAPG: CPT | Performed by: INTERNAL MEDICINE

## 2024-11-13 PROCEDURE — 84100 ASSAY OF PHOSPHORUS: CPT | Performed by: INTERNAL MEDICINE

## 2024-11-13 PROCEDURE — 84484 ASSAY OF TROPONIN QUANT: CPT

## 2024-11-13 PROCEDURE — 82330 ASSAY OF CALCIUM: CPT | Performed by: REGISTERED NURSE

## 2024-11-13 PROCEDURE — 93325 DOPPLER ECHO COLOR FLOW MAPG: CPT

## 2024-11-13 PROCEDURE — 87529 HSV DNA AMP PROBE: CPT | Performed by: REGISTERED NURSE

## 2024-11-13 PROCEDURE — 93306 TTE W/DOPPLER COMPLETE: CPT | Performed by: STUDENT IN AN ORGANIZED HEALTH CARE EDUCATION/TRAINING PROGRAM

## 2024-11-13 PROCEDURE — 94640 AIRWAY INHALATION TREATMENT: CPT

## 2024-11-13 PROCEDURE — 89050 BODY FLUID CELL COUNT: CPT | Performed by: REGISTERED NURSE

## 2024-11-13 PROCEDURE — 80048 BASIC METABOLIC PNL TOTAL CA: CPT | Performed by: INTERNAL MEDICINE

## 2024-11-13 PROCEDURE — 0241U HB NFCT DS VIR RESP RNA 4 TRGT: CPT | Performed by: REGISTERED NURSE

## 2024-11-13 PROCEDURE — 93005 ELECTROCARDIOGRAM TRACING: CPT

## 2024-11-13 PROCEDURE — 3044F HG A1C LEVEL LT 7.0%: CPT | Performed by: NURSE PRACTITIONER

## 2024-11-13 PROCEDURE — 82805 BLOOD GASES W/O2 SATURATION: CPT

## 2024-11-13 PROCEDURE — 84484 ASSAY OF TROPONIN QUANT: CPT | Performed by: REGISTERED NURSE

## 2024-11-13 PROCEDURE — 85610 PROTHROMBIN TIME: CPT

## 2024-11-13 PROCEDURE — 93308 TTE F-UP OR LMTD: CPT | Performed by: INTERNAL MEDICINE

## 2024-11-13 PROCEDURE — 62270 DX LMBR SPI PNXR: CPT | Performed by: EMERGENCY MEDICINE

## 2024-11-13 PROCEDURE — 83735 ASSAY OF MAGNESIUM: CPT | Performed by: INTERNAL MEDICINE

## 2024-11-13 PROCEDURE — 31622 DX BRONCHOSCOPE/WASH: CPT | Performed by: EMERGENCY MEDICINE

## 2024-11-13 PROCEDURE — 86051 AQUAPORIN-4 ANTB ELISA: CPT | Performed by: REGISTERED NURSE

## 2024-11-13 PROCEDURE — 71045 X-RAY EXAM CHEST 1 VIEW: CPT

## 2024-11-13 PROCEDURE — 80164 ASSAY DIPROPYLACETIC ACD TOT: CPT | Performed by: REGISTERED NURSE

## 2024-11-13 PROCEDURE — 0B998ZZ DRAINAGE OF LINGULA BRONCHUS, VIA NATURAL OR ARTIFICIAL OPENING ENDOSCOPIC: ICD-10-PCS | Performed by: STUDENT IN AN ORGANIZED HEALTH CARE EDUCATION/TRAINING PROGRAM

## 2024-11-13 PROCEDURE — 80048 BASIC METABOLIC PNL TOTAL CA: CPT | Performed by: REGISTERED NURSE

## 2024-11-13 PROCEDURE — NC001 PR NO CHARGE: Performed by: EMERGENCY MEDICINE

## 2024-11-13 PROCEDURE — 83605 ASSAY OF LACTIC ACID: CPT | Performed by: REGISTERED NURSE

## 2024-11-13 PROCEDURE — 009U3ZX DRAINAGE OF SPINAL CANAL, PERCUTANEOUS APPROACH, DIAGNOSTIC: ICD-10-PCS

## 2024-11-13 PROCEDURE — 94664 DEMO&/EVAL PT USE INHALER: CPT

## 2024-11-13 PROCEDURE — 94003 VENT MGMT INPAT SUBQ DAY: CPT

## 2024-11-13 PROCEDURE — 86255 FLUORESCENT ANTIBODY SCREEN: CPT | Performed by: REGISTERED NURSE

## 2024-11-13 PROCEDURE — 82550 ASSAY OF CK (CPK): CPT

## 2024-11-13 PROCEDURE — 80061 LIPID PANEL: CPT | Performed by: REGISTERED NURSE

## 2024-11-13 PROCEDURE — 80053 COMPREHEN METABOLIC PANEL: CPT | Performed by: REGISTERED NURSE

## 2024-11-13 PROCEDURE — 85610 PROTHROMBIN TIME: CPT | Performed by: REGISTERED NURSE

## 2024-11-13 PROCEDURE — 95700 EEG CONT REC W/VID EEG TECH: CPT

## 2024-11-13 PROCEDURE — 83735 ASSAY OF MAGNESIUM: CPT | Performed by: REGISTERED NURSE

## 2024-11-13 PROCEDURE — 85730 THROMBOPLASTIN TIME PARTIAL: CPT

## 2024-11-13 PROCEDURE — 0B968ZZ DRAINAGE OF RIGHT LOWER LOBE BRONCHUS, VIA NATURAL OR ARTIFICIAL OPENING ENDOSCOPIC: ICD-10-PCS | Performed by: STUDENT IN AN ORGANIZED HEALTH CARE EDUCATION/TRAINING PROGRAM

## 2024-11-13 PROCEDURE — 82550 ASSAY OF CK (CPK): CPT | Performed by: REGISTERED NURSE

## 2024-11-13 PROCEDURE — 82945 GLUCOSE OTHER FLUID: CPT | Performed by: REGISTERED NURSE

## 2024-11-13 PROCEDURE — 89051 BODY FLUID CELL COUNT: CPT | Performed by: REGISTERED NURSE

## 2024-11-13 PROCEDURE — 84100 ASSAY OF PHOSPHORUS: CPT | Performed by: REGISTERED NURSE

## 2024-11-13 PROCEDURE — 84157 ASSAY OF PROTEIN OTHER: CPT | Performed by: REGISTERED NURSE

## 2024-11-13 PROCEDURE — 99233 SBSQ HOSP IP/OBS HIGH 50: CPT | Performed by: PSYCHIATRY & NEUROLOGY

## 2024-11-13 PROCEDURE — 93306 TTE W/DOPPLER COMPLETE: CPT

## 2024-11-13 PROCEDURE — 85025 COMPLETE CBC W/AUTO DIFF WBC: CPT | Performed by: REGISTERED NURSE

## 2024-11-13 PROCEDURE — 0B988ZZ DRAINAGE OF LEFT UPPER LOBE BRONCHUS, VIA NATURAL OR ARTIFICIAL OPENING ENDOSCOPIC: ICD-10-PCS | Performed by: STUDENT IN AN ORGANIZED HEALTH CARE EDUCATION/TRAINING PROGRAM

## 2024-11-13 PROCEDURE — 87070 CULTURE OTHR SPECIMN AEROBIC: CPT | Performed by: REGISTERED NURSE

## 2024-11-13 PROCEDURE — 99291 CRITICAL CARE FIRST HOUR: CPT | Performed by: INTERNAL MEDICINE

## 2024-11-13 PROCEDURE — 86341 ISLET CELL ANTIBODY: CPT | Performed by: REGISTERED NURSE

## 2024-11-13 PROCEDURE — 94760 N-INVAS EAR/PLS OXIMETRY 1: CPT

## 2024-11-13 PROCEDURE — 82805 BLOOD GASES W/O2 SATURATION: CPT | Performed by: REGISTERED NURSE

## 2024-11-13 PROCEDURE — 93308 TTE F-UP OR LMTD: CPT

## 2024-11-13 PROCEDURE — 83036 HEMOGLOBIN GLYCOSYLATED A1C: CPT | Performed by: REGISTERED NURSE

## 2024-11-13 PROCEDURE — 85730 THROMBOPLASTIN TIME PARTIAL: CPT | Performed by: INTERNAL MEDICINE

## 2024-11-13 PROCEDURE — 87483 CNS DNA AMP PROBE TYPE 12-25: CPT | Performed by: REGISTERED NURSE

## 2024-11-13 PROCEDURE — 80202 ASSAY OF VANCOMYCIN: CPT | Performed by: EMERGENCY MEDICINE

## 2024-11-13 RX ORDER — PANTOPRAZOLE SODIUM 40 MG/10ML
40 INJECTION, POWDER, LYOPHILIZED, FOR SOLUTION INTRAVENOUS
Status: DISCONTINUED | OUTPATIENT
Start: 2024-11-13 | End: 2024-11-15

## 2024-11-13 RX ORDER — HEPARIN SODIUM 5000 [USP'U]/ML
7500 INJECTION, SOLUTION INTRAVENOUS; SUBCUTANEOUS EVERY 8 HOURS SCHEDULED
Status: DISCONTINUED | OUTPATIENT
Start: 2024-11-13 | End: 2024-11-13

## 2024-11-13 RX ORDER — CEFTRIAXONE 2 G/1
2 INJECTION, POWDER, FOR SOLUTION INTRAMUSCULAR; INTRAVENOUS EVERY 24 HOURS
Status: DISCONTINUED | OUTPATIENT
Start: 2024-11-13 | End: 2024-11-13 | Stop reason: ALTCHOICE

## 2024-11-13 RX ORDER — DEXMEDETOMIDINE HYDROCHLORIDE 4 UG/ML
.1-.7 INJECTION, SOLUTION INTRAVENOUS
Status: DISCONTINUED | OUTPATIENT
Start: 2024-11-13 | End: 2024-11-13

## 2024-11-13 RX ORDER — HEPARIN SODIUM 5000 [USP'U]/ML
5000 INJECTION, SOLUTION INTRAVENOUS; SUBCUTANEOUS EVERY 8 HOURS SCHEDULED
Status: DISCONTINUED | OUTPATIENT
Start: 2024-11-13 | End: 2024-11-13

## 2024-11-13 RX ORDER — LORAZEPAM 2 MG/ML
2 INJECTION INTRAMUSCULAR EVERY 4 HOURS PRN
Status: DISCONTINUED | OUTPATIENT
Start: 2024-11-13 | End: 2024-11-30

## 2024-11-13 RX ORDER — ATORVASTATIN CALCIUM 10 MG/1
10 TABLET, FILM COATED ORAL
Status: DISCONTINUED | OUTPATIENT
Start: 2024-11-13 | End: 2024-12-04

## 2024-11-13 RX ORDER — LEVALBUTEROL INHALATION SOLUTION 1.25 MG/3ML
1.25 SOLUTION RESPIRATORY (INHALATION)
Status: DISCONTINUED | OUTPATIENT
Start: 2024-11-13 | End: 2024-12-09

## 2024-11-13 RX ORDER — LORAZEPAM 2 MG/ML
INJECTION INTRAMUSCULAR
Status: COMPLETED
Start: 2024-11-13 | End: 2024-11-13

## 2024-11-13 RX ORDER — POTASSIUM CHLORIDE 29.8 MG/ML
40 INJECTION INTRAVENOUS ONCE
Status: COMPLETED | OUTPATIENT
Start: 2024-11-13 | End: 2024-11-13

## 2024-11-13 RX ORDER — MICONAZOLE NITRATE 20 MG/G
CREAM TOPICAL 2 TIMES DAILY
Status: DISCONTINUED | OUTPATIENT
Start: 2024-11-13 | End: 2024-11-19

## 2024-11-13 RX ORDER — ALBUTEROL SULFATE 0.83 MG/ML
2.5 SOLUTION RESPIRATORY (INHALATION) EVERY 6 HOURS PRN
Status: DISCONTINUED | OUTPATIENT
Start: 2024-11-13 | End: 2024-12-09

## 2024-11-13 RX ORDER — ALBUMIN HUMAN 50 G/1000ML
25 SOLUTION INTRAVENOUS ONCE
Status: COMPLETED | OUTPATIENT
Start: 2024-11-13 | End: 2024-11-13

## 2024-11-13 RX ORDER — LORAZEPAM 2 MG/ML
4 INJECTION INTRAMUSCULAR ONCE
Status: COMPLETED | OUTPATIENT
Start: 2024-11-13 | End: 2024-11-13

## 2024-11-13 RX ORDER — MAGNESIUM SULFATE HEPTAHYDRATE 40 MG/ML
4 INJECTION, SOLUTION INTRAVENOUS ONCE
Status: COMPLETED | OUTPATIENT
Start: 2024-11-13 | End: 2024-11-13

## 2024-11-13 RX ORDER — MAGNESIUM SULFATE HEPTAHYDRATE 40 MG/ML
2 INJECTION, SOLUTION INTRAVENOUS ONCE
Status: COMPLETED | OUTPATIENT
Start: 2024-11-13 | End: 2024-11-13

## 2024-11-13 RX ORDER — PROPOFOL 10 MG/ML
40 INJECTION, EMULSION INTRAVENOUS
Status: DISCONTINUED | OUTPATIENT
Start: 2024-11-13 | End: 2024-11-14

## 2024-11-13 RX ORDER — LEVOTHYROXINE SODIUM 100 UG/1
100 TABLET ORAL
Status: DISCONTINUED | OUTPATIENT
Start: 2024-11-13 | End: 2024-12-03

## 2024-11-13 RX ORDER — NYSTATIN 100000 U/G
CREAM TOPICAL 2 TIMES DAILY
Status: DISCONTINUED | OUTPATIENT
Start: 2024-11-13 | End: 2024-11-13

## 2024-11-13 RX ORDER — HEPARIN SODIUM 10000 [USP'U]/100ML
3-20 INJECTION, SOLUTION INTRAVENOUS
Status: DISCONTINUED | OUTPATIENT
Start: 2024-11-13 | End: 2024-11-17

## 2024-11-13 RX ORDER — LORAZEPAM 2 MG/ML
8 INJECTION INTRAMUSCULAR ONCE
Status: COMPLETED | OUTPATIENT
Start: 2024-11-13 | End: 2024-11-13

## 2024-11-13 RX ORDER — NYSTATIN 100000 [USP'U]/G
POWDER TOPICAL 2 TIMES DAILY
Status: DISCONTINUED | OUTPATIENT
Start: 2024-11-13 | End: 2024-11-13

## 2024-11-13 RX ADMIN — NYSTATIN: 100000 CREAM TOPICAL at 08:47

## 2024-11-13 RX ADMIN — PROPOFOL 50 MCG/KG/MIN: 10 INJECTION, EMULSION INTRAVENOUS at 03:18

## 2024-11-13 RX ADMIN — PROPOFOL 70 MCG/KG/MIN: 10 INJECTION, EMULSION INTRAVENOUS at 16:15

## 2024-11-13 RX ADMIN — PANTOPRAZOLE SODIUM 40 MG: 40 INJECTION, POWDER, FOR SOLUTION INTRAVENOUS at 07:00

## 2024-11-13 RX ADMIN — NOREPINEPHRINE BITARTRATE 10 MCG/MIN: 1 INJECTION, SOLUTION, CONCENTRATE INTRAVENOUS at 09:17

## 2024-11-13 RX ADMIN — LORAZEPAM 4 MG: 2 INJECTION INTRAMUSCULAR; INTRAVENOUS at 03:22

## 2024-11-13 RX ADMIN — ACYCLOVIR SODIUM 775 MG: 50 INJECTION, SOLUTION INTRAVENOUS at 20:16

## 2024-11-13 RX ADMIN — IPRATROPIUM BROMIDE 0.5 MG: 0.5 SOLUTION RESPIRATORY (INHALATION) at 18:54

## 2024-11-13 RX ADMIN — ATORVASTATIN CALCIUM 10 MG: 10 TABLET, FILM COATED ORAL at 17:45

## 2024-11-13 RX ADMIN — MIDAZOLAM 10 MG/HR: 5 INJECTION INTRAMUSCULAR; INTRAVENOUS at 19:12

## 2024-11-13 RX ADMIN — POTASSIUM CHLORIDE 40 MEQ: 29.8 INJECTION, SOLUTION INTRAVENOUS at 09:01

## 2024-11-13 RX ADMIN — PROPOFOL 70 MCG/KG/MIN: 10 INJECTION, EMULSION INTRAVENOUS at 18:28

## 2024-11-13 RX ADMIN — CHLORHEXIDINE GLUCONATE 0.12% ORAL RINSE 15 ML: 1.2 LIQUID ORAL at 20:15

## 2024-11-13 RX ADMIN — HEPARIN SODIUM 7500 UNITS: 5000 INJECTION INTRAVENOUS; SUBCUTANEOUS at 07:00

## 2024-11-13 RX ADMIN — HEPARIN SODIUM 11.1 UNITS/KG/HR: 10000 INJECTION, SOLUTION INTRAVENOUS at 10:35

## 2024-11-13 RX ADMIN — MIDAZOLAM 10 MG/HR: 5 INJECTION INTRAMUSCULAR; INTRAVENOUS at 11:45

## 2024-11-13 RX ADMIN — AMIODARONE HYDROCHLORIDE 1 MG/MIN: 50 INJECTION, SOLUTION INTRAVENOUS at 16:07

## 2024-11-13 RX ADMIN — LEVETIRACETAM 1000 MG: 100 INJECTION, SOLUTION INTRAVENOUS at 20:33

## 2024-11-13 RX ADMIN — LEVALBUTEROL HYDROCHLORIDE 1.25 MG: 1.25 SOLUTION RESPIRATORY (INHALATION) at 14:31

## 2024-11-13 RX ADMIN — LEVETIRACETAM 1000 MG: 100 INJECTION, SOLUTION INTRAVENOUS at 08:46

## 2024-11-13 RX ADMIN — MAGNESIUM SULFATE HEPTAHYDRATE 2 G: 40 INJECTION, SOLUTION INTRAVENOUS at 18:27

## 2024-11-13 RX ADMIN — NOREPINEPHRINE BITARTRATE 26 MCG/MIN: 1 INJECTION, SOLUTION, CONCENTRATE INTRAVENOUS at 21:43

## 2024-11-13 RX ADMIN — MAGNESIUM SULFATE HEPTAHYDRATE 4 G: 40 INJECTION, SOLUTION INTRAVENOUS at 09:01

## 2024-11-13 RX ADMIN — MAGNESIUM SULFATE HEPTAHYDRATE 2 G: 40 INJECTION, SOLUTION INTRAVENOUS at 03:23

## 2024-11-13 RX ADMIN — ALBUMIN (HUMAN) 25 G: 12.5 INJECTION, SOLUTION INTRAVENOUS at 21:20

## 2024-11-13 RX ADMIN — VALPROATE SODIUM 250 MG: 100 INJECTION, SOLUTION INTRAVENOUS at 04:21

## 2024-11-13 RX ADMIN — NOREPINEPHRINE BITARTRATE 18 MCG/MIN: 1 INJECTION, SOLUTION, CONCENTRATE INTRAVENOUS at 17:47

## 2024-11-13 RX ADMIN — SENNOSIDES AND DOCUSATE SODIUM 2 TABLET: 50; 8.6 TABLET ORAL at 08:46

## 2024-11-13 RX ADMIN — VASOPRESSIN 0.04 UNITS/MIN: 20 INJECTION INTRAVENOUS at 21:19

## 2024-11-13 RX ADMIN — IPRATROPIUM BROMIDE 0.5 MG: 0.5 SOLUTION RESPIRATORY (INHALATION) at 10:31

## 2024-11-13 RX ADMIN — IPRATROPIUM BROMIDE 0.5 MG: 0.5 SOLUTION RESPIRATORY (INHALATION) at 14:31

## 2024-11-13 RX ADMIN — DEXTROSE 150 MG: 50 INJECTION, SOLUTION INTRAVENOUS at 15:48

## 2024-11-13 RX ADMIN — CEFTRIAXONE SODIUM 2000 MG: 10 INJECTION, POWDER, FOR SOLUTION INTRAVENOUS at 02:22

## 2024-11-13 RX ADMIN — LIDOCAINE HYDROCHLORIDE 300 MG: 10 INJECTION, SOLUTION EPIDURAL; INFILTRATION; INTRACAUDAL; PERINEURAL at 00:00

## 2024-11-13 RX ADMIN — MICONAZOLE NITRATE 1 APPLICATION: 20 CREAM TOPICAL at 17:46

## 2024-11-13 RX ADMIN — LORAZEPAM 8 MG: 2 INJECTION INTRAMUSCULAR at 04:04

## 2024-11-13 RX ADMIN — CHLORHEXIDINE GLUCONATE 0.12% ORAL RINSE 15 ML: 1.2 LIQUID ORAL at 08:46

## 2024-11-13 RX ADMIN — PROPOFOL 70 MCG/KG/MIN: 10 INJECTION, EMULSION INTRAVENOUS at 13:48

## 2024-11-13 RX ADMIN — SODIUM CHLORIDE, SODIUM GLUCONATE, SODIUM ACETATE, POTASSIUM CHLORIDE, MAGNESIUM CHLORIDE, SODIUM PHOSPHATE, DIBASIC, AND POTASSIUM PHOSPHATE 100 ML/HR: .53; .5; .37; .037; .03; .012; .00082 INJECTION, SOLUTION INTRAVENOUS at 03:06

## 2024-11-13 RX ADMIN — CALCIUM GLUCONATE 2 G: 20 INJECTION, SOLUTION INTRAVENOUS at 02:22

## 2024-11-13 RX ADMIN — LEVALBUTEROL HYDROCHLORIDE 1.25 MG: 1.25 SOLUTION RESPIRATORY (INHALATION) at 18:54

## 2024-11-13 RX ADMIN — VALPROATE SODIUM 2000 MG: 100 INJECTION, SOLUTION INTRAVENOUS at 05:12

## 2024-11-13 RX ADMIN — SENNOSIDES AND DOCUSATE SODIUM 2 TABLET: 50; 8.6 TABLET ORAL at 17:45

## 2024-11-13 RX ADMIN — PROPOFOL 70 MCG/KG/MIN: 10 INJECTION, EMULSION INTRAVENOUS at 07:35

## 2024-11-13 RX ADMIN — VALPROATE SODIUM 250 MG: 100 INJECTION, SOLUTION INTRAVENOUS at 09:16

## 2024-11-13 RX ADMIN — MIDAZOLAM 10 MG/HR: 5 INJECTION INTRAMUSCULAR; INTRAVENOUS at 05:10

## 2024-11-13 RX ADMIN — LEVOTHYROXINE SODIUM 100 MCG: 100 TABLET ORAL at 07:00

## 2024-11-13 RX ADMIN — ACYCLOVIR SODIUM 550 MG: 50 INJECTION, SOLUTION INTRAVENOUS at 08:47

## 2024-11-13 RX ADMIN — PROPOFOL 70 MCG/KG/MIN: 10 INJECTION, EMULSION INTRAVENOUS at 22:05

## 2024-11-13 RX ADMIN — VANCOMYCIN HYDROCHLORIDE 2000 MG: 10 INJECTION, POWDER, LYOPHILIZED, FOR SOLUTION INTRAVENOUS at 03:19

## 2024-11-13 RX ADMIN — LORAZEPAM 8 MG: 2 INJECTION INTRAMUSCULAR; INTRAVENOUS at 04:04

## 2024-11-13 RX ADMIN — VASOPRESSIN 0.04 UNITS/MIN: 20 INJECTION INTRAVENOUS at 15:21

## 2024-11-13 RX ADMIN — SODIUM CHLORIDE, SODIUM GLUCONATE, SODIUM ACETATE, POTASSIUM CHLORIDE, MAGNESIUM CHLORIDE, SODIUM PHOSPHATE, DIBASIC, AND POTASSIUM PHOSPHATE 100 ML/HR: .53; .5; .37; .037; .03; .012; .00082 INJECTION, SOLUTION INTRAVENOUS at 13:46

## 2024-11-13 RX ADMIN — PROPOFOL 70 MCG/KG/MIN: 10 INJECTION, EMULSION INTRAVENOUS at 05:51

## 2024-11-13 RX ADMIN — VALPROATE SODIUM 250 MG: 100 INJECTION, SOLUTION INTRAVENOUS at 21:52

## 2024-11-13 RX ADMIN — SERTRALINE HYDROCHLORIDE 50 MG: 50 TABLET ORAL at 08:46

## 2024-11-13 RX ADMIN — PROPOFOL 70 MCG/KG/MIN: 10 INJECTION, EMULSION INTRAVENOUS at 11:46

## 2024-11-13 RX ADMIN — PROPOFOL 70 MCG/KG/MIN: 10 INJECTION, EMULSION INTRAVENOUS at 20:15

## 2024-11-13 RX ADMIN — VALPROATE SODIUM 250 MG: 100 INJECTION, SOLUTION INTRAVENOUS at 16:12

## 2024-11-13 RX ADMIN — LEVALBUTEROL HYDROCHLORIDE 1.25 MG: 1.25 SOLUTION RESPIRATORY (INHALATION) at 10:31

## 2024-11-13 RX ADMIN — SODIUM BICARBONATE 50 MEQ: 84 INJECTION INTRAVENOUS at 22:47

## 2024-11-13 RX ADMIN — PROPOFOL 70 MCG/KG/MIN: 10 INJECTION, EMULSION INTRAVENOUS at 09:26

## 2024-11-13 NOTE — RESPIRATORY THERAPY NOTE
RT Protocol Note  Adelina Soto 64 y.o. female MRN: [id number]  Unit/Bed#: ICU 05 Encounter: [id number]    Assessment    Active Problems:  There are no active Hospital Problems.      Home Pulmonary Medications:  Albuterol PRN        Past Medical History:   Diagnosis Date    Anemia     Arthritis     Cancer of kidney (HCC)     Chronic kidney disease     Diabetes mellitus (HCC)     Disease of thyroid gland     HLD (hyperlipidemia)     Hypertension     Ovarian cancer (HCC)     Pneumonia      Social History     Socioeconomic History    Marital status: Single     Spouse name: Not on file    Number of children: 0    Years of education: Not on file    Highest education level: 12th grade   Occupational History    Not on file   Tobacco Use    Smoking status: Former     Current packs/day: 0.00     Average packs/day: 3.0 packs/day for 30.0 years (90.0 ttl pk-yrs)     Types: Cigarettes     Start date: 10/22/1980     Quit date: 10/22/2010     Years since quittin.0    Smokeless tobacco: Former     Quit date: 2011    Tobacco comments:     quit approx. 9 years ago   Vaping Use    Vaping status: Never Used   Substance and Sexual Activity    Alcohol use: Never     Comment: n/a    Drug use: Yes     Types: Marijuana     Comment: smokes with girlfriend when anxious    Sexual activity: Yes     Partners: Female   Other Topics Concern    Not on file   Social History Narrative    · Most recent tobacco use screenin2020      · Do you currently or have you served in the [employer]:   No      · Were you activated, into active duty, as a member of the [employer] or as a Reservist:   No      ·     Last modified by [email or web address]   2020, 10:39      Social Determinants of Health     Financial Resource Strain: Medium Risk (2023)    Overall Financial Resource Strain (CARDIA)     Difficulty of Paying Living Expenses: Somewhat hard   Food Insecurity: No Food Insecurity (2024)    Hunger Vital Sign     Worried  About Running Out of Food in the Last Year: Never true     Ran Out of Food in the Last Year: Never true   Transportation Needs: No Transportation Needs (11/4/2024)    PRAPARE - Transportation     Lack of Transportation (Medical): No     Lack of Transportation (Non-Medical): No   Physical Activity: Not on file   Stress: No Stress Concern Present (10/22/2020)    [employer] [place] [employer] - Occupational Stress Questionnaire     Feeling of Stress : Not at all   Social Connections: Socially Isolated (10/22/2020)    Social Connection and Isolation Panel [NHANES]     Frequency of Communication with Friends and Family: Once a week     Frequency of Social Gatherings with Friends and Family: Never     Attends [employer] Services: Never     Active Member of Clubs or Organizations: No     Attends Club or Organization Meetings: Never     Marital Status: Never    Intimate Partner Violence: Not At Risk (10/22/2020)    Humiliation, Afraid, Rape, and Kick questionnaire     Fear of Current or Ex-Partner: No     Emotionally Abused: No     Physically Abused: No     Sexually Abused: No   Housing Stability: Low Risk  (11/4/2024)    Housing Stability Vital Sign     Unable to Pay for Housing in the Last Year: No     Number of Times Moved in the Last Year: 1     Homeless in the Last Year: No       Subjective         Objective    Physical Exam:   Assessment Type: Assess only  General Appearance: Sedated  Respiratory Pattern: Assisted  Chest Assessment: Chest expansion symmetrical  Bilateral Breath Sounds: Diminished, Clear  O2 Device: Vent    Vitals:  SpO2 95%, not currently breastfeeding.          Imaging and other studies:     O2 Device: Vent     Plan    Respiratory Plan: Vent/NIV/HFNC        Resp Comments: pt brought over to ICU 5 by EMS and placed on Vent on current CMV settings. Tube is secured and vent settings are set and alarms are working. Pt is here for seizures. Per chart pt has no resp hx other than past  smoking hx and takes PRN albuterol at home.

## 2024-11-13 NOTE — ED NOTES
Report given to Sherry HADLEY. Pt caretaker Constanza Allen requested this RN tell ICU nurse that she would like to be contacted  with any updates at any hour.      Miesha Suarez RN  11/12/24 7151

## 2024-11-13 NOTE — UTILIZATION REVIEW
"NOTIFICATION OF INPATIENT ADMISSION   AUTHORIZATION REQUEST   SERVICING FACILITY:   Formerly [facility], later [facility]  Address: [address] [place] [address]  Tax ID: [id number]  NPI: [id number] ATTENDING PROVIDER:  Attending Name and NPI#: Reba Velez Do [[id number]]  Address: [address] [place] [address]  Phone: [phone]   ADMISSION INFORMATION:  Place of Service: Inpatient [facility]  Place of Service Code: 21  Inpatient Admission Date/Time: 11/12/24 10:09 PM  Discharge Date/Time: No discharge date for patient encounter.  Admitting Diagnosis Code/Description:  Seizure (HCC) [R56.9]     UTILIZATION REVIEW CONTACT:  Pita \"Em\"Shahbaz Utilization   Network Utilization Review Department  Phone: [phone]  Fax: [phone]  Email: [email or web address]  Contact for approvals/pending authorizations, clinical reviews, and discharge.     PHYSICIAN ADVISORY SERVICES:  Medical Necessity Denial & Ffgh-ct-Bwqu Review  Phone: [phone]  Fax: [phone]  Email: [email or web address]     DISCHARGE SUPPORT TEAM:  For Patients Discharge Needs & Updates  Phone: [phone] opt. 2 Fax: [phone]  Email: [email or web address]     "

## 2024-11-13 NOTE — H&P
H&P - Critical Care/ICU   Name: Adelina Soto 64 y.o. female I MRN: [id number]  Unit/Bed#: ICU 05 I Date of Admission: 11/12/2024   Date of Service: 11/12/24 I Hospital Day: 1       Assessment & Plan   Neuro:   Diagnosis: Refractory status epilepticus  Plan: Suspect secondary to meningitis  Follow-up CSF cultures  Continue vancomycin, ceftriaxone, acyclovir  Video EEG monitoring  Continue Keppra, valproate  Follow-up valproic acid level  Diagnosis: Pain, sedation  Plan: Propofol drip  As needed fentanyl  Diagnosis: Depression, anxiety  Plan: Continue home dose Zoloft    CV:   Diagnosis: Atrial fibrillation, hyperlipidemia  Plan: Hold home dose Coumadin  Status post 2 units FFP  Continue home dose statin  Monitor on telemetry  Diagnosis: Troponin elevation type II MI secondary to demand ischemia  Plan: Troponin on admission 470 --> 1657  Trend troponin until peaked  EKG with each troponin  Diagnosis: Hypotension, history of hypertension  Plan: Hold home dose antihypertensives in setting of hypotension  Levophed drip to maintain MAP greater than 65  Follow-up echo    Pulm:  Diagnosis: Acute hypoxic respiratory failure  Plan: Maintain vent  Status post bronchoscopy 11/13 for mucous plug  Follow-up culture  Follow-up post bronch x-ray    GI:   Diagnosis: GERD  Plan: Continue home dose PPI    :   Diagnosis: CKD 3  Plan: Baseline creatinine 1.7 -1.9  Creatinine on admission 1.7  Trend BUN and creatinine  Strict I&Os     F/E/N:   Isolyte gtt   Replete electrolytes as needed  N.p.o.    Heme/Onc:   Diagnosis: DVT prophylaxis  Plan: Subcu heparin, SCDs    Endo:   Diagnosis: DM 2  Plan: Sliding scale insulin  Follow-up a.m. hemoglobin A1c  Diagnosis: Hypothyroidism  Plan: Resume home dose levothyroxine    ID:   Diagnosis: Leukocytosis, meningitis  Plan: Continue acyclovir, vancomycin, ceftriaxone  Trend fever curve and WBC  As needed Tylenol for fever    MSK/Skin:   Diagnosis: Moisture associated wounds, pressure  injury  Plan: Wound care consulted  Nystatin for groin  Frequent turns repositioning    Disposition: Critical care    History of Present Illness   Adelina Soto is a 64 y.o. with a past medical history of hypertension, hyperlipidemia, A-fib on Coumadin, morbid obesity, DM 2, hypothyroidism.  Presented to [facility] with altered mental status, found down by sister with seizure-like activity.  Witness tonic-clonic seizures with EMS, given 6 of Versed.  On arrival to ED patient had left gaze preference CTA head and neck negative for acute etiology. MRI concerning for acute HSV infection. Intubated for airway protection due to status epilepticus, transferred to [facility] for further management.    History obtained from chart review and the patient.      Historical Information   Past Medical History:  No date: Anemia  No date: Arthritis  No date: Cancer of kidney (HCC)  No date: Chronic kidney disease  No date: Diabetes mellitus (HCC)  No date: Disease of thyroid gland  No date: HLD (hyperlipidemia)  No date: Hypertension  No date: Ovarian cancer (HCC)  No date: Pneumonia Past Surgical History:  No date: CHOLECYSTECTOMY  5/16/2016: CT GUIDED PERC DRAINAGE CATHETER PLACEMENT  05/01/2004: GALLBLADDER SURGERY  06/01/2018: HYSTERECTOMY  08/02/2018: NEPHRECTOMY; Right   Current Outpatient Medications   Medication Instructions    albuterol (PROVENTIL HFA,VENTOLIN HFA) 90 mcg/act inhaler 1 puff, Inhalation, Every 4 hours PRN    albuterol 2.5 mg, Nebulization, Every 6 hours PRN    amLODIPine (NORVASC) 5 mg tablet take 1 tablet by mouth once daily    atorvastatin (LIPITOR) 10 mg, Oral, Daily    Blood Glucose Monitoring Suppl (Embrace Pro Glucose Meter) SAVANNAH Does not apply, 2 times daily, Embrace glucometer, test twice daily    cholecalciferol (VITAMIN D3) 400 Units, Oral, Daily    collagenase (SANTYL) ointment Topical, Daily    Embrace Lancets Ultra Thin 30G MISC Does not apply, 2 times daily    famotidine (PEPCID) 20 mg,  Oral, Daily    ferrous sulfate 325 mg, Oral, Daily with breakfast, Every other day    furosemide (LASIX) 40 mg, Oral, Daily    glucose blood (Embrace Blood Glucose Test) test strip Use as instructed to check sugar bid    hydrOXYzine HCL (ATARAX) 50 mg, Oral, Daily at bedtime    ipratropium (ATROVENT) 0.02 % nebulizer solution USE 1 VIAL IN NEBULIZER 4 TIMES DAILY    ipratropium-albuterol (DUO-NEB) 0.5-2.5 mg/3 mL nebulizer solution 3 mL, Nebulization, Every 8 hours PRN    levothyroxine 100 mcg, Oral, Daily    LORazepam (ATIVAN) 0.5 mg, Oral, 2 times daily PRN    metoprolol tartrate (LOPRESSOR) 50 mg, Oral, 2 times daily    nystatin (MYCOSTATIN) cream Topical, 2 times daily    pantoprazole (PROTONIX) 40 mg, Oral, Daily    sertraline (ZOLOFT) 50 mg, Oral, Daily    triamcinolone (KENALOG) 0.1 % ointment 1 Application, Topical, 2 times daily, To affected area    warfarin (COUMADIN) 1 mg, Oral, Daily (warfarin)    warfarin (COUMADIN) 3 mg, Oral, Daily    No Known Allergies   Social History     Tobacco Use    Smoking status: Former     Current packs/day: 0.00     Average packs/day: 3.0 packs/day for 30.0 years (90.0 ttl pk-yrs)     Types: Cigarettes     Start date: 10/22/1980     Quit date: 10/22/2010     Years since quittin.0    Smokeless tobacco: Former     Quit date: 2011    Tobacco comments:     quit approx. 9 years ago   Vaping Use    Vaping status: Never Used   Substance Use Topics    Alcohol use: Never     Comment: n/a    Drug use: Yes     Types: Marijuana     Comment: smokes with girlfriend when anxious    Family History   Problem Relation Age of Onset    No Known Problems Mother     No Known Problems Father           Objective :                   Vitals I/O      Most Recent Min/Max in 24hrs   Temp 99.3 °F (37.4 °C) Temp  Min: 98.8 °F (37.1 °C)  Max: 100.2 °F (37.9 °C)   Pulse (!) 110 Pulse  Min: 87  Max: 147   Resp 18 Resp  Min: 10  Max: 34   BP 90/53 BP  Min: 76/52  Max: 194/82   O2 Sat 95 % SpO2  Min:  80 %  Max: 98 %    No intake or output data in the 24 hours ending 11/13/24 0209    Diet NPO    Invasive Monitoring           Physical Exam   Physical Exam  Vitals and nursing note reviewed.   Eyes:      Comments: Left pupil 3 reactive (cataract)   Right pupil 3 reactive    Skin:     General: Skin is warm.      Capillary Refill: Capillary refill takes less than 2 seconds.   HENT:      Head: Normocephalic.      Mouth/Throat:      Mouth: Mucous membranes are moist.   Cardiovascular:      Rate and Rhythm: Normal rate.      Pulses: Normal pulses.   Abdominal:      Hernia: A hernia is present.   Constitutional:       Appearance: She is ill-appearing.   Pulmonary:      Effort: Pulmonary effort is normal.      Breath sounds: Normal breath sounds.   Neurological:      Comments: Opens eyes to pain   Does not track  Withdrawals BUE  No response to stimulation BLE    Genitourinary/Anorectal:  Stringer present.        Diagnostic Studies        Lab Results: I have reviewed the following results:     Medications:  Scheduled PRN   acyclovir, 10 mg/kg (Ideal), Q12H  calcium gluconate, 2 g, Once  cefTRIAXone, 2,000 mg, Q12H  chlorhexidine, 15 mL, Q12H LINO  fentaNYL, 100 mcg, Once  levETIRAcetam, 1,000 mg, Q12H LINO  magnesium sulfate, 2 g, Once  midazolam, 2 mg, Once  nystatin, , BID  senna-docusate sodium, 2 tablet, BID  vancomycin, 25 mg/kg (Adjusted), Once  vancomycin, 1,000 mg, Q24H      acetaminophen, 1,000 mg, Q6H PRN  albuterol, 2.5 mg, Q6H PRN  fentaNYL, 50 mcg, Q1H PRN  ondansetron, 4 mg, Q4H PRN       Continuous    multi-electrolyte, 100 mL/hr  norepinephrine, 1-30 mcg/min, Last Rate: Stopped (11/13/24 0130)  propofol, 5-50 mcg/kg/min, Last Rate: 40 mcg/kg/min (11/13/24 0124)  vasopressin, 0.04 Units/min, Last Rate: Stopped (11/13/24 0128)         Labs:   CBC    Recent Labs     11/12/24 0817 11/12/24 2228   WBC 14.72* 17.44*   HGB 13.8 13.6   HCT 45.8 45.1   * 450*     BMP    Recent Labs     11/12/24 0817  11/12/24 2228   SODIUM 134* 135   K 4.7 4.2   CL 96 100   CO2 24 23   AGAP 14* 12   BUN 24 25   CREATININE 1.70* 1.90*   CALCIUM 9.7 8.9       Coags    Recent Labs     11/12/24 0817 11/12/24 2228   INR 2.42* 2.22*   PTT 47*  --         Additional Electrolytes  Recent Labs     11/12/24 2228   MG 1.7*   PHOS 3.3   CAIONIZED 0.99*          Blood Gas    Recent Labs     11/12/24 2239   PHART 7.315*   [id number] 45.6*   PO2ART 93.4   BUU5FPS 22.7   BEART -3.6   SOURCE Line, Arterial     Recent Labs     11/12/24 2239   SOURCE Line, Arterial    LFTs  No recent results    Infectious  Recent Labs     11/12/24 2132   PROCALCITONI 0.36*     Glucose  Recent Labs     11/12/24 0817 11/12/24 2228   GLUC 238* 127

## 2024-11-13 NOTE — PROCEDURES
Central Line Insertion    Date/Time: 11/12/2024 11:48 PM    Performed by: Dustin Lugo DO  Authorized by: Dustin Lugo DO    Patient location:  ICU  Consent:     Consent obtained:  Emergent situation    Alternatives discussed:  Delayed treatment  Universal protocol:     Procedure explained and questions answered to patient or proxy's satisfaction: yes      Relevant documents present and verified: yes      Test results available and properly labeled: yes      Radiology Images displayed and confirmed.  If images not available, report reviewed: yes      Required blood products, implants, devices, and special equipment available: yes      Site/side marked: yes      Immediately prior to procedure, a time out was called: yes      Patient identity confirmed:  Arm band  Pre-procedure details:     Hand hygiene: Hand hygiene performed prior to insertion      Sterile barrier technique: All elements of maximal sterile technique followed      Skin preparation:  2% chlorhexidine  Indications:     Central line indications: medications requiring central line and hemodynamic monitoring    Anesthesia (see MAR for exact dosages):     Anesthesia method:  Local infiltration    Local anesthetic:  Lidocaine 1% w/o epi  Procedure details:     Location:  Right internal jugular    Vessel type: vein      Laterality:  Right    Approach: percutaneous technique used      Patient position:  Trendelenburg    Catheter type:  Triple lumen 16cm    Catheter size:  7 Fr    Landmarks identified: yes      Ultrasound guidance: yes      Ultrasound image availability:  Images available in PACS    Sterile ultrasound techniques: Sterile gel and sterile probe covers were used      Manometry confirmation: yes      Number of attempts:  1    Successful placement: yes      Catheter tip vessel location: atriocaval junction    Post-procedure details:     Post-procedure:  Dressing applied and line sutured    Assessment:  No pneumothorax on x-ray     Post-procedure complications: none      Patient tolerance of procedure:  Tolerated well, no immediate complications    Dustin Lugo,    PGY-5, Pulmonary/Critical Care  [facility]

## 2024-11-13 NOTE — ED CARE HANDOFF
Emergency Department Sign Out Note        Sign out and transfer of care from Dr. Sahni. See Separate Emergency Department note.     The patient, Adelina Soto, was evaluated by the previous provider for altered mental status, seizures.    Workup Completed:  Initial evaluation, labs, imaging, neurology consult, critical care consult.  Care was signed over to me pending completion of transfer    ED Course / Workup Pending (followup):               Stroke Assessment       Row Name 11/12/24 0828             NIH Stroke Scale    Interval Baseline      Level of Consciousness (1a.) 0      LOC Questions (1b.) 2      LOC Commands (1c.) 2      Best Gaze (2.) 1      Visual (3.) 0      Facial Palsy (4.) 0      Motor Arm, Left (5a.) 2      Motor Arm, Right (5b.) 0      Motor Leg, Left (6a.) 0      Motor Leg, Right (6b.) 0      Limb Ataxia (7.) 0      Sensory (8.) 1      Best Language (9.) 0      Dysarthria (10.) 0      Extinction and Inattention (11.) (Formerly Neglect) 2      Total 10                                  ED Course as of 11/13/24 0614   Tue Nov 12, 2024   1914 SO: 64 y F. Sister found her unresponsive this morning. Per EMS, witnessed seizure x 2. No seizure history. Left gaze on arrival, stroke alert called, CT imaging negative. Then had additional seizures x 3 in the ED, status epilepticus. Intubated. Left temporal lesion on MRI. Accepted to neuro ICU at [facility]". Possible herpes encephalitis. Started on acyclovir, Keppra, valproic acid. On Coumadin for A fib, reversed with Vitamin K and FFP.   2109 I was notified by nursing staff as the patient became more hypoxic while on ventilator.  Bilateral breath sounds present and symmetric.  Tidal volumes and peak pressures appear normal on ventilator.  Chest x-ray post-intubation on my review appears to show increasing opacification of the left lung, however the patient is also rotated which may affect this imaging.  Will attempt to reposition the patient as she is  "currently leaning on her left side bed, will reposition to the right side.  She is intubated and also experiencing rising temperature.  Will expand workup with sepsis labs including lactic acid, procalcitonin, blood cultures, and start empiric cefepime and vancomycin.     Procedures  Medical Decision Making  This patient was initially seen by Dr. Sahni and I assumed care for the patient at change of shift, pending completion of transfer.  See separate ER provider note. This is a 64-year-old female who initially presented this morning after she was found by her sister unresponsive.  Per EMS initial report, the patient had 2 witnessed seizures with no prior history of seizure activity.  On arrival the patient was found to have left gaze deviation and was initiated as a stroke alert workup.  CT imaging was negative for any signs of acute stroke.  The patient had an MRI and neurology consult while in the ER and her MRI showed \"Limited by motion. Mild restricted diffusion with abnormal FLAIR signal in the mesial left temporal lobe could be postictal. Recommend correlation with CSF to exclude infection such as HSV. Abnormal signal in the right thalamus of uncertain etiology. Recommend repeat MRI with contrast.\"  The patient subsequently experienced seizure activity x 3 while in the ED and was determined to be in status epilepticus.  The patient was intubated by Dr. Sahni and given concern of possible herpes encephalitis the patient was started on acyclovir as well as given antiepileptic medications (Keppra, valproate). Transfer had initially been arranged to go to [facility] to the seventh floor epilepsy monitoring unit, however given her clinical deterioration her transfer was upgraded to critical care.  Critical care recommended reversal of the patient's Coumadin that she takes for her atrial fibrillation, and vitamin K and FFP were ordered.  At this point, care was signed over to me pending arrival of " transport team to take the patient to [facility].    While awaiting transfer, I was notified by nursing staff as the patient became more hypoxic while on ventilator.  Bilateral breath sounds present and symmetric.  Tidal volumes and peak pressures appear normal on ventilator.  Chest x-ray post-intubation on my review appears to show increasing opacification of the left lung, however the patient is also rotated which may affect this imaging.  Will attempt to reposition the patient as she is currently leaning on her left side bed, will reposition to the right side.  She is intubated and also experiencing rising temperature.  Will expand workup with sepsis labs including lactic acid, procalcitonin, blood cultures, and start empiric cefepime and vancomycin.    Transport arrived to take the patient to ICU at [facility].  She had received Keppra 3 g, valproate 1 g, and acyclovir 550 mg prior to transfer.  The vitamin K and FFP were ordered, but she only received the vitamin K before she left for transfer.  I updated critical care at [facility] regarding this information.    Amount and/or Complexity of Data Reviewed  Labs: ordered.  Radiology: ordered.    Risk  OTC drugs.  Prescription drug management.            Disposition  Final diagnoses:   Stroke-like symptom   Seizure disorder (HCC)   Altered mental status, unspecified altered mental status type   Atrial fibrillation, unspecified type (HCC)   Elevated troponin   Status epilepticus (HCC)     Time reflects when diagnosis was documented in both MDM as applicable and the Disposition within this note       Time User Action Codes Description Comment    11/12/2024  7:54 AM Xander Sahni [R29.90] Stroke-like symptom     11/12/2024  9:30 AM Xander Sahni [G40.909] Seizure disorder (HCC)     11/12/2024  9:30 AM Xander Sahni [R41.82] Altered mental status, unspecified altered mental status type     11/12/2024  1:29 PM Xander Sahni Add  [I48.91] Atrial fibrillation, unspecified type (HCC)     11/12/2024  1:29 PM Xander Sahni [R79.89] Elevated troponin     11/12/2024  6:46 PM Xander Sahni [G40.901] Status epilepticus (HCC)           ED Disposition       ED Disposition   Transfer to Another Facility-In Network    Condition   --    Date/Time   Tue Nov 12, 2024  9:30 AM    Comment   Adelina Soto should be transferred out to [facility]               MD Documentation      Flowsheet Row Most Recent Value   Patient Condition The patient has been stabilized such that within reasonable medical probability, no material deterioration of the patient condition or the condition of the unborn child(ana) is likely to result from the transfer   Reason for Transfer Level of Care needed not available at this facility   Benefits of Transfer Specialized equipment and/or services available at the receiving facility (Include comment)________________________   Risks of Transfer Potential for delay in receiving treatment, Potential deterioration of medical condition, Loss of IV, Increased discomfort during transfer, Possible worsening of condition or death during transfer   Accepting Physician LEONARDO   Accepting Facility Name, City & State  [facility]   Sending MD Sahni   Provider Certification General risk, such as traffic hazards, adverse weather conditions, rough terrain or turbulence, possible failure of equipment (including vehicle or aircraft), or consequences of actions of persons outside the control of the transport personnel, Unanticipated needs of medical equipment and personnel during transport, Risk of worsening condition, The possibility of a transport vehicle being unavailable          RN Documentation      Flowsheet Row Most Recent Value   Accepting Facility Name, City & State  [facility]          Follow-up Information    None       Discharge Medication List as of 11/12/2024  9:54 PM        CONTINUE these medications which have NOT CHANGED    Details    albuterol (2.5 mg/3 mL) 0.083 % nebulizer solution Take 3 mL (2.5 mg total) by nebulization every 6 (six) hours as needed for wheezing or shortness of breath, Starting Wed 9/4/2024, Normal      albuterol (PROVENTIL HFA,VENTOLIN HFA) 90 mcg/act inhaler Inhale 1 puff every 4 (four) hours as needed for wheezing, Starting Fri 9/13/2024, Normal      amLODIPine (NORVASC) 5 mg tablet take 1 tablet by mouth once daily, Normal      atorvastatin (LIPITOR) 10 mg tablet Take 1 tablet (10 mg total) by mouth daily, Starting Fri 8/23/2024, Normal      Blood Glucose Monitoring Suppl (Stream Media Pro Glucose Meter) SAVANNAH Use 2 (two) times a day Stream Media glucometer, test twice daily, Starting Tue 11/30/2021, Normal      cholecalciferol (VITAMIN D3) 400 units tablet Take 1 tablet (400 Units total) by mouth daily, Starting Mon 11/20/2023, Normal      collagenase (SANTYL) ointment Apply topically daily, Starting Wed 10/26/2022, Normal      Embrace Lancets Ultra Thin 30G MISC Use 2 (two) times a day, Starting Mon 3/20/2023, Normal      famotidine (PEPCID) 20 mg tablet Take 1 tablet (20 mg total) by mouth daily, Starting Thu 4/25/2024, Normal      ferrous sulfate 325 (65 Fe) mg tablet Take 1 tablet (325 mg total) by mouth daily with breakfast Every other day, Starting Tue 3/26/2024, Normal      furosemide (LASIX) 40 mg tablet Take 1 tablet (40 mg total) by mouth daily, Starting Wed 2/15/2023, Until Mon 11/4/2024, Normal      glucose blood (Stream Media Blood Glucose Test) test strip Use as instructed to check sugar bid, Normal      hydrOXYzine HCL (ATARAX) 50 mg tablet Take 1 tablet (50 mg total) by mouth daily at bedtime, Starting Wed 9/20/2023, Normal      ipratropium (ATROVENT) 0.02 % nebulizer solution USE 1 VIAL IN NEBULIZER 4 TIMES DAILY, Normal      ipratropium-albuterol (DUO-NEB) 0.5-2.5 mg/3 mL nebulizer solution Take 3 mL by nebulization every 8 (eight) hours as needed for wheezing or shortness of breath, Starting Wed 7/26/2023, Normal       levothyroxine 100 mcg tablet Take 1 tablet (100 mcg total) by mouth daily, Starting Fri 8/23/2024, Normal      LORazepam (ATIVAN) 0.5 mg tablet Take 1 tablet (0.5 mg total) by mouth 2 (two) times a day as needed for anxiety, Starting Mon 11/4/2024, Normal      metoprolol tartrate (LOPRESSOR) 50 mg tablet Take 1 tablet (50 mg total) by mouth 2 (two) times a day, Starting Thu 4/25/2024, Normal      nystatin (MYCOSTATIN) cream Apply topically 2 (two) times a day, Starting Mon 9/18/2023, Normal      pantoprazole (PROTONIX) 40 mg tablet Take 1 tablet (40 mg total) by mouth daily, Starting Thu 4/25/2024, Normal      sertraline (ZOLOFT) 50 mg tablet Take 1 tablet (50 mg total) by mouth daily, Starting Thu 4/25/2024, Normal      triamcinolone (KENALOG) 0.1 % ointment APPLY TO AFFECTED AREA TWICE A DAY, Starting Tue 10/8/2024, Normal      !! warfarin (Coumadin) 1 mg tablet Take 1 tablet (1 mg total) by mouth daily, Starting Fri 5/26/2023, Normal      !! warfarin (COUMADIN) 3 mg tablet Take 1 tablet (3 mg total) by mouth daily, Starting Thu 4/25/2024, Normal       !! - Potential duplicate medications found. Please discuss with provider.        No discharge procedures on file.       ED Provider  Electronically Signed by     Michael Maldonado MD  11/13/24 0636

## 2024-11-13 NOTE — RESPIRATORY THERAPY NOTE
Bedside Broch      Bedside Bronch done at this time with MD. NO issues during bronch pt stable. One sample collected and sent to lab.

## 2024-11-13 NOTE — DISCHARGE INSTR - OTHER ORDERS
Skin Care Plan:  1-Cleanse B/L Sacro-Buttocks and Anterior and Posterior Upper Thighs with soap and water. Apply Triad Paste to open aspects of sacro-buttocks. Apply EARLINE Anti-Fungal Cream to Intact aspects of B/L Sacro-Buttocks and posterior thighs BID and PRN episodes of incontinence  2-Turn/reposition q2h or when medically stable for pressure re-distribution on skin. Utilize ATR turning and repositioning system for patient   3-Elevate heels to offload pressure. Apply offloading boots to b/l feet.   4-Moisturize skin daily with skin nourishing cream  5-Ehob cushion in chair when out of bed.  6-Preventative Hydraguard to bilateral heels BID and PRN.   7-B/L Abdomen/Pannus, Breast Folds, and Right Back Fold Wounds: cleanse with soap and water and pat dry. Apply calcium alginate rope (Melgisorb Plus Cavity) to skin fold. Change daily or PRN soilage/displacement.   8-Kre Low Air Loss Mattress ordered for patient   9-Right Upper Back Blisters: Apply a silicone bordered foam dressing to blistered areas. Vinay with T for Treatment and change every other day or PRN.   10-Apply a preventative 2x2 silicone bordered foam dressing to right cheek. Vinay with P for Prevention and change every 3 days or PRN.   11- Apply 3m no sting skin prep to left medial thigh scab daily.

## 2024-11-13 NOTE — CASE MANAGEMENT
Case Management Assessment & Discharge Planning Note    [id number]  : 1960 Date 2024       Current Admission Date: 2024  Current Admission Diagnosis:Seizure (HCC)   Patient Active Problem List    Diagnosis Date Noted Date Diagnosed    Seizure (HCC) 2024     Diabetic nephropathy associated with type 2 diabetes mellitus (HCC) 2024     Coagulopathy (HCC) 2024     Type 2 diabetes mellitus with hyperglycemia, unspecified whether long term insulin use (HCC) 2024     Chronic anticoagulation 2023     Chronic respiratory failure with hypercapnia (HCC) 2023     CKD (chronic kidney disease) 2023     Anemia in stage 3b chronic kidney disease  (HCC) 2023     Chronic respiratory failure with hypoxia (HCC) 2023     History of hysterectomy 2022     Panniculitis 2022     Abnormal CT scan 2022     Panniculus 2022     Bilateral pleural effusion 2022     Cardiomegaly 2022     Depression, recurrent (HCC) 2022     Pre-ulcerative corn or callous 2022     Morbid obesity with BMI of 50.0-59.9, adult (HCC) 2021     Secondary hyperparathyroidism of renal origin (HCC) 2021     GERD (gastroesophageal reflux disease) 2020     Anxiety 2020     Chronic constipation 2020     Paroxysmal atrial fibrillation (HCC) 2020     COPD with asthma (HCC) 2020     Hypothyroid 2020     Cellulitis 09/10/2020     H/O right nephrectomy 09/10/2020     Hyperparathyroidism (HCC) 2020     Dyslipidemia 2019     Hypertensive chronic kidney disease with stage 1 through stage 4 chronic kidney disease, or unspecified chronic kidney disease 10/04/2018     Persistent proteinuria 10/04/2018     Iron deficiency 10/04/2018       LOS (days): 1  Geometric Mean LOS (GMLOS) (days): 4.4  Days to GMLOS:3.9     OBJECTIVE:    Risk of Unplanned  Readmission Score: 29.99         Current admission status: Inpatient       Preferred Pharmacy:   [facility]/pharmacy #[id number] - [place] - [address]  [address] [address]  Phone: [phone] Fax: [phone]    [facility] - [place], [place] - [address]  [address]  [address]  [facility] [address]  Phone: [phone] Fax: [phone]    Primary Care Provider: TULIO Beaver    Primary Insurance: [employer] [facility]  Secondary Insurance:     ASSESSMENT:  Active Health Care Proxies    There are no active Health Care Proxies on file.                 Readmission Root Cause  30 Day Readmission: No    Patient Information  Admitted from:: Other (comment) (transfer from [facility])  Mental Status: Intubated  During Assessment patient was accompanied by: Other-Comment (s/o Constanza)  Assessment information provided by:: Other - please comment (s/o Constanza)  Support Systems: Spouse/significant other  County of Residence: [place]  What [facility] do you live in?: [place]  Home entry access options. Select all that apply.: No steps to enter home  Type of Current Residence: Apartment  Upon entering residence, is there a bedroom on the main floor (no further steps)?: Yes  Upon entering residence, is there a bathroom on the main floor (no further steps)?: Yes  Living Arrangements: Lives w/ Spouse/significant other    Activities of Daily Living Prior to Admission  Functional Status: Assistance  Completes ADLs independently?: No  Level of ADL dependence: Assistance  Ambulates independently?: No  Level of ambulatory dependence: Assistance  Does patient use assisted devices?: Yes  Assisted Devices (DME) used: Electric wheelchair, Walker, Wheelchair, Home Oxygen concentrator, Shower Chair, Bedside Commode, Portable Oxygen tanks (grab bars, 2 electric WC)  DME Company Name (respiratory supplies): [employer]  O2 Rate(s): 2 liters  Does patient currently  own DME?: Yes  What DME does the patient currently own?: Bedside Commode, Electric Wheelchair, Home Oxygen concentrator, Walker, Shower Chair, Wheelchair, Other (Comment) (grab bars, 2 electric wheelchairs)  Does patient have a history of Outpatient Therapy (PT/OT)?: Yes  Does the patient have a history of Short-Term Rehab?: Yes ([facility])  Does patient have a history of [facility]?: [facility]  Does patient currently have HHC?: No         Patient Information Continued  Does patient have prescription coverage?: Yes  Does patient receive dialysis treatments?: No  Does patient have a history of substance abuse?: No  Does patient have a history of Mental Health Diagnosis?: Yes (anxiety/depression)  Is patient receiving treatment for mental health?: Yes (PMD managing)         Means of [facility]  [facility] ([facility] ([facility] through insurance)      Social Determinants of Health (SDOH)      Flowsheet Row Most Recent Value   Housing Stability    In the last 12 months, was there a time when you were not able to pay the mortgage or rent on time? N   In the past 12 months, how many times have you moved where you were living? 0   At any time in the past 12 months, were you homeless or living in a shelter (including now)? N   Transportation Needs    In the past 12 months, has lack of transportation kept you from medical appointments or from getting medications? no   In the past 12 months, has lack of transportation kept you from meetings, work, or from getting things needed for daily living? No   Food Insecurity    Within the past 12 months, you worried that your food would run out before you got the money to buy more. Never true   Within the past 12 months, the food you bought just didn't last and you didn't have money to get more. Never true   Utilities    In the past 12 months has the electric, gas, oil, or water company threatened to shut off services in your home? No             DISCHARGE DETAILS:    Discharge planning discussed with:: MINDY S/O Constanza  [place] of Choice: Yes     CM contacted family/caregiver?: Yes  Were Treatment Team discharge recommendations reviewed with patient/caregiver?: No (waiting for recs)  Did patient/caregiver verbalize understanding of patient care needs?: Yes  Were patient/caregiver advised of the risks associated with not following Treatment Team discharge recommendations?: Yes    Contacts  Patient Contacts: Constanza  Relationship to Patient:: Family  Contact Method: In Person  Reason/Outcome: Continuity of Care, Emergency Contact, Discharge Planning            Additional Comments: Patient's significant other Constanza was at the bedside.  Constanza says she has been patient's caregiver for years.  Constanza states patient has been able to stand/pivot to wheelchair and has been able to manage washing her trunk area herself.  Constanza says patient has an [facility] coordinator Shaista [phone] who can assist with patient needs at [place].  Constanza informed CM patient only wants care at home.  CM to be available.     CM reviewed d/c planning process including the following: identifying help at home, patient preference for d/c planning needs, Discharge Lounge, [facility] Meds to Bed program, availability of treatment team to discuss questions or concerns patient and/or family may have regarding understanding medications and recognizing signs and symptoms once discharged.  CM also encouraged patient to follow up with all recommended appointments after discharge. Patient advised of importance for patient and family to participate in managing patient’s medical well being.

## 2024-11-13 NOTE — ED NOTES
P/u: 21:45   Transport: [facility]   To: BE ICU 5  Accepting doctor: Dr cardona  # for report: [phone]     Evaristo Mazariegos RN  11/12/24 0664

## 2024-11-13 NOTE — RESPIRATORY THERAPY NOTE
11/13/24 0721   Respiratory Assessment   Resp Comments Pt resting. No vent changes at this time. Will continue to monitor pt.   Additional Assessments   SpO2 94 %

## 2024-11-13 NOTE — ED NOTES
Pt repositioned due to leaning more to the left side and de sating into the 80's. Pt suctioned about 100cc of gastric content  and now sating 94%.     Miesha Suarez RN  11/12/24 0664

## 2024-11-13 NOTE — PROCEDURES
Lumbar puncture    Date/Time: 11/13/2024 1:24 AM    Performed by: Philip Morelos DO  Authorized by: Philip Morelos DO  Universal Protocol:  Procedure performed by: (Dr. Schultz)  Consent: The procedure was performed in an emergent situation.  Patient identity confirmed: arm band    Patient location:  Bedside  Pre-procedure details:     Preparation: Patient was prepped and draped in usual sterile fashion    Indications:     Indications: evaluation for infection, evaluation for altered mental status and evaluation of opening pressure    Anesthesia (see MAR for exact dosages):     Anesthesia method:  None  Procedure details:     Lumbar space:  L3-L4 interspace    Patient position:  L lateral decubitus    Equipment: Lumbar puncture kit used      Needle gauge:  20G x 3.5in    Needle type:  Keely point    Ultrasound guidance: no      Number of attempts:  3    Opening pressure (cm H2O):  25    Fluid appearance:  Clear    Tubes of fluid:  4    Total volume (ml):  10  Post-procedure:     Puncture site:  Adhesive bandage applied    Patient tolerance of procedure:  Tolerated well, no immediate complications

## 2024-11-13 NOTE — PROGRESS NOTES
Progress Note - Critical Care/ICU   Name: Adelina Soto 64 y.o. female I MRN: [id number]  Unit/Bed#: ICU 05 I Date of Admission: 11/12/2024   Date of Service: 11/13/2024 I Hospital Day: 1           Patient is a 64-year-old female with altered mental status.  She was noted to be unresponsive at 5:30 AM by her sister.  Patient was reported to have 2 seizure events for EMS for which she received Versed.  On arrival to the emergency department patient had a left gaze and right hemineglect.  She was also nonverbal.  Stroke alert initiated as patient had left gaze preference and right hemineglect.  CT/CTA did not show any findings consistent with stroke.  Patient required intubation secondary to multiple seizure events.  During her time in the emergency department neurology recommended MRI seizure protocol.  Patient was able to obtain MRI while awaiting transfer.  MRI returned with left temporal mesial restricted pattern concerning for infectious versus seizure event.  Patient was initially administered Keppra.  She continued to have seizure despite receiving Keppra.  Neurocritical care was contacted recommended adding Depakote.  Also recommended reversing INR for planned lumbar puncture.  Patient was transferred to [facility] unfortunately did not receive reversal of coagulopathy prior to leaving.  She was administered acyclovir but in a biotics were not able to be infused prior to transfer.  Patient was also noted to have hypoxic events for which FiO2 was increased.  Chest x-ray prior to transfer was concerning for aspiration with parenchymal changes noted in the left lung.      Visit Vitals  BP 90/53   Pulse (!) 110   Temp 99.3 °F (37.4 °C)   Resp 18   SpO2 99%   OB Status Hysterectomy   Smoking Status Former     GEN: NAD, awake and alert  HEENT: EOMI, PERRLA, MMM  CV: Tachycardia, irregular  Resp: Decreased breath sounds left lung with transmitted bronchial breath sounds bilateral  GI: Obese with large  pannus, large ventral hernia   : urinary catheter in place, maceration throughout the groin and genital area with skin weeping  Neuro: Initially patient was obtunded, off sedation she opens her eyes and appears to be gazing in all directions but does not track or follow commands, no withdrawal bilateral lower extremities, withdrawal bilateral upper extremities with grimace to noxious stimuli  Skin: warm, dry, maceration to the groin, candidal appearing rash under bilateral breasts, old sacral wounds and purple ecchymotic skin changes bilateral buttocks    All imaging and laboratory data reviewed    Refractory status epilepticus with no history of seizures in the past  Acute encephalopathy  Abnormal MRI with restricted diffusion in the mesial left temporal lobe-infectious versus seizure related  Acute hypoxic respiratory failure secondary to failure to protect airway and aspiration event  Left lung atelectasis secondary to mucous plugging/aspiration status post bronchoscopy  NSTEMI  Atrial fibrillation  Coagulopathy secondary to Coumadin  Anemia  Diabetes mellitus type 2-glycemic control with insulin sliding scale  Hypothyroid-continue Synthroid  Hyperlipidemia-continue Lipitor  Renal cancer status post right nephrectomy  Ovarian cancer    Monitor patient's neurologic exam every 2 hours.  Continue with propofol and plan to transition to Precedex after EEG is obtained.  Lumbar puncture will be obtained after administration of FFP.  Repeat INR to assess for appropriate response FFP.  Patient had been administer vitamin K.  Continue with empiric antibiotics as well as acyclovir while awaiting CSF studies.  Maintain on Keppra as well as Depakote.  Check both Keppra and Depakote level in AM.  Patient has new onset seizures as a concern for possible infection.  Acyclovir had been started prior to transfer.  With patient's history of both renal cancer and as well as ovarian cancer will check paraneoplastic and epilepsy  studies on CSF.    The patient has no focality to exam or decrease GCS by greater than 2 points plan to reimage emergently    Maintain on ventilator.  Patient is not appropriate for spontaneous breathing trial.  Maintain saturations greater than 92%.  Chest x-ray reviewed with endotracheal tube in good position.  Patient has left lung atelectasis.  Bronchoscopy will be performed.    Aggressive airway clearance protocol.    Patient has elevated troponin, cardiology had been contacted prior to transfer for which they did not recommend any acute interventions.  Continue to trend troponins till peak.  Monitor EKG.  No anticoagulation or aspirin therapy at this time as plan is to perform lumbar puncture after correction of coagulopathy.  Patient does have a history of atrial fibrillation.  Attempt to rate control.  Obtain echo    Patient has hypotension for which she is requiring vasoactive support.  Likely etiology for hypotension is related to medication effect as well as dehydration.  Cannot exclude the possibility of SIRS response related to infection.  Patient does have findings on her imaging concerning for HSV.  She also has a urine sample which is concerning for possible UTI.  Continue with empiric antibiotics.   IV fluids.  Maintain on norepinephrine to a goal of 65 for mean arterial blood pressure.  Continue to monitor endpoints of resuscitation.  Patient did have a elevated lactate level which may be secondary to volume depletion versus SIRS response versus medication induced.    Critical care does not include procedures or family update.  Critical care time 52 minutes

## 2024-11-13 NOTE — PROGRESS NOTES
Contacted by epileptologist patient has had multiple seizure events since she has been hooked up to EEG.  Propofol had been transitioned off to Precedex.  Patient was reported to have approximately 15 seizures in 20 minutes in the left frontal area.  Seizures are lasting approximately 10 to 15 seconds.  Propofol was restarted.  Ativan 4 mg to be administered.  Plan is for seizure suppression versus burst suppression.  Patient had been administered Keppra as well as Depakote.    AEDs will be adjusted.  Depakote level to be checked.  Consider adding third agent if therapeutic on Depakote and Keppra..  Highly suspicious that patient has HSV with MRI findings.  Acyclovir has been initiated.  LP completed.    Critical care time does not include procedures or family update.  Critical care time 34 minutes

## 2024-11-13 NOTE — RESPIRATORY THERAPY NOTE
RT Ventilator Management Note  Adelina Soto 64 y.o. female MRN: [id number]  Unit/Bed#: ICU 05 Encounter: [id number]      Daily Screen    No data found in the last 10 encounters.           Physical Exam:   Assessment Type: (P) Assess only  General Appearance: (P) Sedated  Respiratory Pattern: (P) Assisted  Chest Assessment: (P) Chest expansion symmetrical  Bilateral Breath Sounds: (P) Diminished, Clear  O2 Device: (P) vent      Resp Comments: (P) Pt stable at this time on current CMV settings. No changes made. WIll cont to monitor pt per protocol

## 2024-11-13 NOTE — ASSESSMENT & PLAN NOTE
"Adelina Soto is a 64 year old woman with PMHx including HTN, HLD, hypothyroidism, A fib on Warfarin, DM, GERD, anxiety who presented to the hospital for evaluation of altered mental status. Stroke alert called due to gaze deviation and altered mentation. Pt was noted to have two witnessed seizures prior to arrival, and received 6 mg Versed prior to arrival. Continued to have left gaze preference for ED, with lack of command following although was moving her LUE spontaneously. CT head and CTA H/N with no findings that could explain her presentation. After return from CT scan, appeared to have some improvement in her exam but still off from her baseline on discussion with sister at bedside. Concern for status epilepticus with multiple seizures without return to baseline as of yet. Otherwise compliant with Coumadin, with INR 2.42.     VEEG monitoring report of 08:00 11/13: 2:20 this morning and around 02:40 started to have repetitive left frontal seizures, every 10 seconds at times. She was started on sedation again shortly after 03:00 and seizures stopped at that point. EEG is now very low amplitude and slow, with frequent left temporal sharps\"    Impression: status epilepticus, which required multiple AED & sedation medications to control in setting of no history of seizures & currently unknown etiology, but concern for UTI vs HSV vs meningitis warranting ongoing workup.    TPA Decision: Patient not a candidate. Unclear time of onset outside appropriate time window. and Bleeding risk. Not an endovascular candidate since no LVO/IR target identified.      Plan:   - MRI brain repeat w contrast recommended  - Cont Keppra 1 g q12 hrs tonight  - Cont valproate 250 Q6 hrs  - video EEG cont  - Rec consider burst suppression  - Trough total levels in AM  - Free valp trough level 11/15 rec  - Await final CSF results immuno vs HSV  - Seizure precautions  - Telemetry rec  - Will need DMV report  - Urine cx pending, innum danita " w/ 10-20 WBC  - Goal of normothermia and euglycemia  - Therapy when able  - Continue home Warfarin  - Systolic BP goal less than 180  - Continue to monitor closely for changes in examination, notify Neurology if so  - Rest of care per primary  - Seizure precautions outpatient: no driving, no operating heavy machinery, no swimming alone, no climbing, no baths only showers, no cooking alone, or any activity that would put them at increased risk of harm if they were to have a seizure.

## 2024-11-13 NOTE — CASE MANAGEMENT
Case Management Discharge Planning Note    [facility] [id number]  : 1960 Date 2024       Current Admission Date: 2024  Current Admission Diagnosis:Seizure (HCC)   Patient Active Problem List    Diagnosis Date Noted Date Diagnosed    Seizure (HCC) 2024     Diabetic nephropathy associated with type 2 diabetes mellitus (HCC) 2024     Coagulopathy (HCC) 2024     Type 2 diabetes mellitus with hyperglycemia, unspecified whether long term insulin use (HCC) 2024     Chronic anticoagulation 2023     Chronic respiratory failure with hypercapnia (HCC) 2023     CKD (chronic kidney disease) 2023     Anemia in stage 3b chronic kidney disease  (HCC) 2023     Chronic respiratory failure with hypoxia (HCC) 2023     History of hysterectomy 2022     Panniculitis 2022     Abnormal CT scan 2022     Panniculus 2022     Bilateral pleural effusion 2022     Cardiomegaly 2022     Depression, recurrent (HCC) 2022     Pre-ulcerative corn or callous 2022     Morbid obesity with BMI of 50.0-59.9, adult (HCC) 2021     Secondary hyperparathyroidism of renal origin (HCC) 2021     GERD (gastroesophageal reflux disease) 2020     Anxiety 2020     Chronic constipation 2020     Paroxysmal atrial fibrillation (HCC) 2020     COPD with asthma (HCC) 2020     Hypothyroid 2020     Cellulitis 09/10/2020     H/O right nephrectomy 09/10/2020     Hyperparathyroidism (HCC) 2020     Dyslipidemia 2019     Hypertensive chronic kidney disease with stage 1 through stage 4 chronic kidney disease, or unspecified chronic kidney disease 10/04/2018     Persistent proteinuria 10/04/2018     Iron deficiency 10/04/2018       LOS (days): 1  Geometric Mean LOS (GMLOS) (days):   Days to GMLOS:     OBJECTIVE:  Risk of Unplanned Readmission Score: 29.99          Current admission status: Inpatient   Preferred Pharmacy:   [facility]/pharm[facility] #[id number] - [place] - [address]  [address] [address]  Phone: [phone] Fax: [phone]    [facility] - [place], [place] - [address]  [address]  [address]  [facility] [address]  Phone: [phone] Fax: [phone]    Primary Care Provider: TULIO [place]    Primary Insurance: [facility]  Secondary Insurance:     DISCHARGE DETAILS:          Additional Comments: Patient is currently on vent.  A VM was left for Emergency Contact care giver Constanza Allen for assessment/DC planning needs and awaiting a call back.  CM to be available

## 2024-11-13 NOTE — PROGRESS NOTES
Progress Note - Critical Care/ICU   Name: Adelina Soto 64 y.o. female I MRN: [id number]  Unit/Bed#: ICU 05 I Date of Admission: 11/12/2024   Date of Service: 11/13/2024 I Hospital Day: 1       Assessment & Plan   Neuro:   Diagnosis: refractory status epilepticus  Plan: Suspect secondary to meningitis  Follow-up CSF cultures  Continue vancomycin, ceftriaxone, acyclovir  Video EEG monitoring  Continue Keppra, valproate  Follow-up valproic acid level, Keppra level  As needed Ativan  Ketogenic diet  Diagnosis: Pain, sedation  Plan: Propofol, Versed drip  As needed fentanyl  Diagnosis: Depression, anxiety  Plan: Continue home dose Zoloft     CV:   Diagnosis: Atrial fibrillation, hyperlipidemia  Plan: Hold home dose Coumadin  Status post 2 units FFP  Continue home dose statin  Monitor on telemetry  Diagnosis: Troponin elevation type II MI secondary to demand ischemia  Plan: Troponin on admission 470 --> 1657  Trend troponin until peaked  EKG with each troponin  Diagnosis: Hypotension, history of hypertension  Plan: Hold home dose antihypertensives in setting of hypotension  Levophed drip to maintain MAP greater than 65  Follow-up echo     Pulm:  Diagnosis: Acute hypoxic respiratory failure  Plan: Maintain vent  Status post bronchoscopy 11/13 for mucous plug  Follow-up culture  Follow-up post bronch x-ray     GI:   Diagnosis: GERD  Plan: Continue home dose PPI     :   Diagnosis: CKD 3  Plan: Baseline creatinine 1.7 -1.9  Creatinine on admission 1.7  Trend BUN and creatinine  Strict I&Os      F/E/N:   Isolyte gtt   Replete electrolytes as needed  N.p.o.     Heme/Onc:   Diagnosis: DVT prophylaxis  Plan: Subcu heparin, SCDs     Endo:   Diagnosis: DM 2  Plan: Sliding scale insulin  Follow-up a.m. hemoglobin A1c  Diagnosis: Hypothyroidism  Plan: Resume home dose levothyroxine     ID:   Diagnosis: Leukocytosis, meningitis  Plan: Continue acyclovir, vancomycin, ceftriaxone  Trend fever curve and WBC  As needed Tylenol for  fever     MSK/Skin:   Diagnosis: Moisture associated wounds, pressure injury  Plan: Wound care consulted  Nystatin for groin  Frequent turns repositioning    Disposition: Critical care    ICU Core Measures     Vented Patient  VAP Bundle  VAP bundle ordered     A: Assess, Prevent, and Manage Pain Has pain been assessed? Yes  Need for changes to pain regimen? No   B: Both Spontaneous Awakening Trials (SATs) and Spontaneous Breathing Trials (SBTs) Plan to perform spontaneous awakening trial today? Yes   Plan to perform spontaneous breathing trial today? Yes   Obvious barriers to extubation? Yes   C: Choice of Sedation RASS Goal: 0 Alert and Calm  Need for changes to sedation or analgesia regimen? No   D: Delirium CAM-ICU: Unable to perform secondary to Acute cognitive dysfunction   E: Early Mobility  Plan for early mobility? Yes   F: Family Engagement Plan for family engagement today? Yes       Antibiotic Review: Patient on appropriate coverage based on culture data.     Review of Invasive Devices:    Stringer Plan: Continue for accurate I/O monitoring for 48 hours  Central access plan: Patient has multiple central venous catheters.   [place] Plan: Keep arterial line for hemodynamic monitoring    Prophylaxis:  VTE VTE covered by:  heparin (porcine), Subcutaneous       Stress Ulcer  covered byfamotidine (PEPCID) 20 mg tablet [[id number]] (Long-Term Med), pantoprazole (PROTONIX) 40 mg tablet [[id number]] (Long-Term Med), pantoprazole (PROTONIX) injection 40 mg [[id number]]         24 Hour Events : Admitted overnight with status epilepticus  Subjective       Objective :                   Vitals I/O      Most Recent Min/Max in 24hrs   Temp 99.3 °F (37.4 °C) Temp  Min: 98.8 °F (37.1 °C)  Max: 100.2 °F (37.9 °C)   Pulse (!) 110 Pulse  Min: 87  Max: 147   Resp 18 Resp  Min: 10  Max: 34   BP 90/53 BP  Min: 76/52  Max: 194/82   O2 Sat 99 % SpO2  Min: 80 %  Max: 99 %      Intake/Output Summary (Last 24 hours) at 11/13/2024 0350  Last  data filed at 11/13/2024 0000  Gross per 24 hour   Intake 280 ml   Output --   Net 280 ml       Diet NPO    Invasive Monitoring           Physical Exam   Physical Exam  Vitals and nursing note reviewed.   Eyes:      Pupils: Pupils are equal, round, and reactive to light.      Comments: Left pupil cataract   Skin:     General: Skin is warm.      Findings: Wound present.   HENT:      Mouth/Throat:      Mouth: Mucous membranes are moist.   Cardiovascular:      Rate and Rhythm: Tachycardia present.      Pulses: Normal pulses.   Abdominal:      Palpations: Abdomen is soft.      Hernia: A hernia is present.   Constitutional:       Appearance: She is ill-appearing.   Pulmonary:      Breath sounds: Normal breath sounds.   Neurological:      Comments: Exam limited due to sedation secondary to status epilepticus  No response to stimulation all extremities  Does not open eyes   Genitourinary/Anorectal:  Stringer present.        Diagnostic Studies        Lab Results: I have reviewed the following results:     Medications:  Scheduled PRN   acyclovir, 10 mg/kg (Ideal), Q12H  atorvastatin, 10 mg, Daily With Dinner  cefTRIAXone, 2,000 mg, Q12H  chlorhexidine, 15 mL, Q12H LINO  heparin (porcine), 7,500 Units, Q8H LINO  levETIRAcetam, 1,000 mg, Q12H LINO  levothyroxine, 100 mcg, Early Morning  magnesium sulfate, 2 g, Once  midazolam, 2 mg, Once  nystatin, , BID  pantoprazole, 40 mg, Q24H LINO  senna-docusate sodium, 2 tablet, BID  sertraline, 50 mg, Daily  valproate sodium, 250 mg, Q6H LINO  vancomycin, 25 mg/kg (Adjusted), Once  vancomycin, 1,000 mg, Q24H      acetaminophen, 1,000 mg, Q6H PRN  albuterol, 2.5 mg, Q6H PRN  fentaNYL, 50 mcg, Q1H PRN  ondansetron, 4 mg, Q4H PRN       Continuous    dexmedetomidine, 0.1-0.7 mcg/kg/hr  multi-electrolyte, 100 mL/hr, Last Rate: 100 mL/hr (11/13/24 0306)  norepinephrine, 1-30 mcg/min, Last Rate: 1 mcg/min (11/13/24 0243)  propofol, 5-50 mcg/kg/min, Last Rate: 50 mcg/kg/min (11/13/24  0318)  vasopressin, 0.04 Units/min, Last Rate: Stopped (11/13/24 0247)         Labs:   CBC    Recent Labs     11/12/24  0817 11/12/24  2228   WBC 14.72* 17.44*   HGB 13.8 13.6   HCT 45.8 45.1   * 450*     BMP    Recent Labs     11/12/24  0817 11/12/24  2228   SODIUM 134* 135   K 4.7 4.2   CL 96 100   CO2 24 23   AGAP 14* 12   BUN 24 25   CREATININE 1.70* 1.90*   CALCIUM 9.7 8.9       Coags    Recent Labs     11/12/24  0817 11/12/24  2228   INR 2.42* 2.22*   PTT 47*  --         Additional Electrolytes  Recent Labs     11/12/24 2228   MG 1.7*   PHOS 3.3   CAIONIZED 0.99*          Blood Gas    Recent Labs     11/13/24  0239   PHART 7.364   [id number] 44.6*   PO2ART 321.2*   [id number] 24.9   BEART -0.7   SOURCE Line, Arterial     Recent Labs     11/13/24  0239   SOURCE Line, Arterial    LFTs  No recent results    Infectious  Recent Labs     11/12/24  2132   PROCALCITONI 0.36*     Glucose  Recent Labs     11/12/24  0817 11/12/24  2228   GLUC 238* 127

## 2024-11-13 NOTE — WOUND OSTOMY CARE
Consult Note - Wound   Adelina Soto 64 y.o. female MRN: [id number]  Unit/Bed#: ICU 05 Encounter: [id number]        History and Present Illness:  Patient is a 63 yo female that was admitted to [facility] for treatment of seizure. Patient has a PMH of hypertension, hyperlipidemia, A-fib on Coumadin, morbid obesity, DM 2, hypothyroidism. Patient is a max assist for turning and repositioning. Patient is intubated, sedated, on continuous EMU. Patient's caregiver and partner at bedside. Patient is incontinent of bowel and bladder is managed via internal urinary catheter. On assessment, patient is seen lying on critical care low air loss mattress. Offloading boots in place. Green offloading wedges in place- patient turned to left side.      Wound Care was consulted for wounds     Assessment Findings:   B/L heels are dry intact and jose with no skin loss or wounds present. Recommend preventative Hydraguard Cream and proper offloading/ repositioning.      B/L sacro-buttocks and posterior upper thighs with dry, intact, pink in color tissue with hyperpigmented scar tissue noted and blanches. Areas are rashy. No skin loss or wounds present. Recommend EARLINE Anti-fungal Cream to areas. Patient's partner reports that patient has a long standing history of wounds to area.  Right Lateral Breast and B/L Abdomen/Pannus MASD/ITD: irregular in shape areas of partial thickness skin loss located in deep skin folds. Wound beds are pink in color. Azalia-wounds are fragile, hyperpigmented and denuded. Scant to small serosanguineous drainage noted. Recommend interdry for areas. Partner says that patient is allergic to nystatin powder.         No induration, fluctuance, odor, warmth/temperature differences, redness, or purulence noted to the above noted wounds and skin areas assessed. New dressings applied per orders listed below. Patient tolerated well- no s/s of non-verbal pain or discomfort observed during the encounter. Bedside nurse  aware of plan of care. See flow sheets for more detailed assessment findings.      Orders listed below and wound care will continue to follow, call or Secure Chat Message with questions.     Skin Care Plan:  1-Cleanse B/L Sacro-Buttocks and Posterior Upper Thighs with soap and water. Apply EARLINE Anti-Fungal Cream to B/L Sacro-Buttocks BID and PRN episodes of incontinence  2-Turn/reposition q2h or when medically stable for pressure re-distribution on skin. Utilize ATR turning and repositioning system for patient   3-Elevate heels to offload pressure. Apply offloading boots to b/l feet.   4-Moisturize skin daily with skin nourishing cream  5-Ehob cushion in chair when out of bed.  6-Preventative Hydraguard to bilateral heels BID and PRN.   7-Cleanse B/L Abdomen/Pannus Folds and B/L Breast Folds with soap and water. Pat dry. Apply Interdry to skin folds (leave 2 inches exposed to wick away moisture). May leave in place for up to 5 days or can be changed sooner due to soilage. May wash, dry, and reuse for patient. DO NOT APPLY CREAMS OR POWDERS TO SKIN THAT INTERDRY WILL LAY IN.      Wounds:  Wound 12/11/22 MASD Groin (Active)   Wound Image    11/13/24 1047   Wound Description Saucier 11/13/24 1200   Azalia-wound Assessment Fragile;Hyperpigmented;Denuded 11/13/24 1200   Wound Length (cm) 4 cm 11/13/24 1200   Wound Width (cm) 16 cm 11/13/24 1200   Wound Depth (cm) 0.1 cm 11/13/24 1200   Wound Surface Area (cm^2) 64 cm^2 11/13/24 1200   Wound Volume (cm^3) 6.4 cm^3 11/13/24 1200   Calculated Wound Volume (cm^3) 6.4 cm^3 11/13/24 1200   Change in Wound Size % -30287.67 11/13/24 1200   Drainage Amount Small 11/13/24 1200   Drainage Description Serosanguineous 11/13/24 1200   Non-staged Wound Description Partial thickness 11/13/24 1200   Treatments Cleansed;Site care 11/13/24 1200   Dressing Other (Comment) 11/13/24 1200   Dressing Changed New 11/13/24 1200   Patient Tolerance Tolerated well 11/13/24 1200   Dressing Status  Dry;Intact;Clean 11/13/24 1200       Wound 11/12/24 Pressure Injury Buttocks Medial (Active)   Wound Image   11/13/24 1050   Wound Description Intact 11/13/24 1500   Pressure Injury Stage O 11/13/24 1500   Azalia-wound Assessment Hyperpigmented;Fragile;Scar Tissue;Rash 11/13/24 1500   Drainage Amount None 11/13/24 1500   Drainage Description Other (Comment) 11/13/24 1500   Treatments Cleansed;Site care 11/13/24 1500   Dressing Open to air 11/13/24 1200   Dressing Changed Changed 11/13/24 1500   Patient Tolerance Tolerated well 11/13/24 1500   Dressing Status Intact 11/13/24 1500       Wound 11/13/24 Breast Lateral;Right;Lower (Active)   Wound Image   11/13/24 1047   Wound Description Pink 11/13/24 1500   Azalia-wound Assessment Denuded;Fragile 11/13/24 1500   Wound Length (cm) 1 cm 11/13/24 1047   Wound Width (cm) 2.5 cm 11/13/24 1047   Wound Depth (cm) 0.1 cm 11/13/24 1047   Wound Surface Area (cm^2) 2.5 cm^2 11/13/24 1047   Wound Volume (cm^3) 0.25 cm^3 11/13/24 1047   Calculated Wound Volume (cm^3) 0.25 cm^3 11/13/24 1047   Drainage Amount Small 11/13/24 1500   Drainage Description Serosanguineous 11/13/24 1500   Non-staged Wound Description Partial thickness 11/13/24 1500   Treatments Cleansed;Site care 11/13/24 1500   Dressing Other (Comment) 11/13/24 1500   Dressing Changed New 11/13/24 1500   Patient Tolerance Tolerated well 11/13/24 1500   Dressing Status Dry;Intact;Clean 11/13/24 1500               Rahel Beatty RN, BSN, CWOCN

## 2024-11-13 NOTE — CONSULTS
Vancomycin IV Pharmacy-to-Dose Consultation    Adelina Soto is a 64 y.o. female who was receiving Vancomycin IV with management by the Pharmacy Consult service for treatment of CNS infection (goal trough 15-20)  .       The patient’s Vancomycin therapy has been discontinued. Thank you for allowing us to take part in this patient's care. Pharmacy will sign-off now; please call or re-consult if there are any questions.        Arpit Jacob, PharmD, BCCCP  Critical Care Clinical Pharmacist  Available via [employer]

## 2024-11-13 NOTE — PROGRESS NOTES
"Progress Note - Neurology   Name: Adelina Soto 64 y.o. female I MRN: [id number]  Unit/Bed#: ICU 05 I Date of Admission: 11/12/2024   Date of Service: 11/13/2024 I Hospital Day: 1    Assessment & Plan  Seizure (HCC)  Adelina Soto is a 64 year old woman with PMHx including HTN, HLD, hypothyroidism, A fib on Warfarin, DM, GERD, anxiety who presented to the hospital for evaluation of altered mental status. Stroke alert called due to gaze deviation and altered mentation. Pt was noted to have two witnessed seizures prior to arrival, and received 6 mg Versed prior to arrival. Continued to have left gaze preference for ED, with lack of command following although was moving her LUE spontaneously. CT head and CTA H/N with no findings that could explain her presentation. After return from CT scan, appeared to have some improvement in her exam but still off from her baseline on discussion with sister at bedside. Concern for status epilepticus with multiple seizures without return to baseline as of yet. Otherwise compliant with Coumadin, with INR 2.42.     VEEG monitoring report of 08:00 11/13: 2:20 this morning and around 02:40 started to have repetitive left frontal seizures, every 10 seconds at times. She was started on sedation again shortly after 03:00 and seizures stopped at that point. EEG is now very low amplitude and slow, with frequent left temporal sharps\"    Impression: status epilepticus, which required multiple AED & sedation medications to control in setting of no history of seizures & currently unknown etiology, but concern for UTI vs HSV vs meningitis warranting ongoing workup.    TPA Decision: Patient not a candidate. Unclear time of onset outside appropriate time window. and Bleeding risk. Not an endovascular candidate since no LVO/IR target identified.      Plan:   - MRI brain repeat w contrast recommended  - Cont Keppra 1 g q12 hrs tonight  - Cont valproate 250 Q6 hrs  - video EEG cont  - Rec " consider burst suppression  - Trough total levels in AM  - Free valp trough level 11/15 rec  - Await final CSF results immuno vs HSV  - Seizure precautions  - Telemetry rec  - Will need DMV report  - Urine cx pending, innum danita w/ 10-20 WBC  - Goal of normothermia and euglycemia  - Therapy when able  - Continue home Warfarin  - Systolic BP goal less than 180  - Continue to monitor closely for changes in examination, notify Neurology if so  - Rest of care per primary  - Seizure precautions outpatient: no driving, no operating heavy machinery, no swimming alone, no climbing, no baths only showers, no cooking alone, or any activity that would put them at increased risk of harm if they were to have a seizure.    Recommendations for outpatient neurological follow up have yet to be determined.  I have discussed with Dr. Bazan the above plan to treat pt. He agrees with the plan.  Please contact the SecureChat role for the Neurology service with any questions/concerns.    Subjective   Pt is intubated on versed & propofol    Review of Systems   Unable to perform ROS: Intubated         Objective :  Temp:  [96.1 °F (35.6 °C)-100.2 °F (37.9 °C)] 96.1 °F (35.6 °C)  HR:  [] 92  BP: ()/(34-89) 90/53  Resp:  [14-28] 23  SpO2:  [80 %-100 %] 97 %  O2 Device: Ventilator  Nasal Cannula O2 Flow Rate (L/min):  [6 L/min] 6 L/min  FiO2 (%):  [100] 100    Physical Exam  Constitutional:       General: She is not in acute distress.     Appearance: She is toxic-appearing. She is not diaphoretic.   HENT:      Head: Normocephalic.      Right Ear: External ear normal.      Left Ear: External ear normal.      Nose: Nose normal.   Eyes:      General: No scleral icterus.        Right eye: No discharge.         Left eye: No discharge.      Conjunctiva/sclera: Conjunctivae normal.   Neurological:      Mental Status: She is disoriented.     Neurological Exam  Mental Status  Unresponsive to painful stimuli. Patient is  nonverbal.    Cranial Nerves  Pupils b/l constriction symmetric w/ poor light response.    Motor  Normal muscle bulk throughout. No fasciculations present. Normal muscle tone. No abnormal involuntary movements.  No movement to pain.    Sensory  Intubated.    Reflexes  Deep tendon reflexes are 2+ and symmetric except as noted.    Coordination    Intubated.    Gait    Intubated.        Lab Results: I have reviewed the following results:  Imaging Results Review: I personally reviewed the following image studies in PACS and associated radiology reports: MRI brain. My interpretation of the radiology images/reports is: Concern for sz findings, possible HSV correlate.  Other Study Results Review: EKG was reviewed.     VTE Pharmacologic Prophylaxis: VTE covered by:  heparin (porcine), Intravenous, 11.1 Units/kg/hr at 11/13/24 1035       Administrative Statements   I have spent a total time of 50 minutes in caring for this patient on the day of the visit/encounter including Diagnostic results, Impressions, Counseling / Coordination of care, Documenting in the medical record, Reviewing / ordering tests, medicine, procedures  , Obtaining or reviewing history  , and Communicating with other healthcare professionals .

## 2024-11-13 NOTE — RESPIRATORY THERAPY NOTE
RT Ventilator Management Note  Adelina Soto 64 y.o. female MRN: [id number]  Unit/Bed#: ICU 05 Encounter: [id number]      Daily Screen         11/13/2024  6843             Patient safety screen outcome:: Failed (P)     Not Ready for Weaning due to:: Underline problem not resolved (P)               Physical Exam:   Assessment Type: (P) Assess only  General Appearance: (P) Sedated  Respiratory Pattern: (P) Assisted  Chest Assessment: (P) Chest expansion symmetrical  Bilateral Breath Sounds: (P) Diminished  Cough: (P) None  Suction: (P) ET Tube  O2 Device: (P) vent      Resp Comments: (P) Pt cont on VC settings, synchronous with ventilator. Sx for small amount of thin clear secretions via ett, no cough.

## 2024-11-13 NOTE — PROGRESS NOTES
Adelina Soto is a 64 y.o. female who is currently ordered Vancomycin IV with management by the Pharmacy Consult service.  Relevant clinical data and objective / subjective history reviewed.  Vancomycin Assessment:  Indication and Goal AUC/Trough: CNS infection (goal trough 15-20)  Clinical Status:   Micro:   pending  Renal Function:  SCr: 1.9 mg/dL  CrCl: 35.8 mL/min  Renal replacement: Not on dialysis  Days of Therapy: 1  Current Dose: 2000mg loading dose followed by 1000mg q24  Vancomycin Plan:  New Dosing:    Estimated AUC: 575 mcg*hr/mL  Estimated Trough: 18.7 mcg/mL  Next Level: 11/15/24 0600  Renal Function Monitoring: Daily BMP and UOP  Pharmacy will continue to follow closely for s/sx of nephrotoxicity, infusion reactions and appropriateness of therapy.  BMP and CBC will be ordered per protocol. We will continue to follow the patient’s culture results and clinical progress daily.    Neto Mishra, Pharmacist

## 2024-11-13 NOTE — PROCEDURES
Arterial Line Insertion    Date/Time: 11/12/2024 10:42 PM    Performed by: TULIO Dubois  Authorized by: TULIO Dubois    Patient location:  ICU  Consent:     Consent obtained:  Emergent situation  Universal protocol:     Immediately prior to procedure a time out was called: yes      Patient identity confirmed:  Hospital-assigned identification number  Indications:     Indications: continuous blood pressure monitoring    Pre-procedure details:     Skin preparation:  Chlorhexidine    Preparation: Patient was prepped and draped in sterile fashion    Procedure details:     Location / Tip of Catheter:  Radial    Laterality:  Right    Needle gauge:  20 G    Number of attempts:  1    Successful placement: yes      Transducer: waveform confirmed    Post-procedure details:     Post-procedure:  Biopatch applied, sterile dressing applied and sutured    CMS:  Normal    Patient tolerance of procedure:  Tolerated well, no immediate complications

## 2024-11-14 ENCOUNTER — APPOINTMENT (INPATIENT)
Dept: NON INVASIVE DIAGNOSTICS | Facility: HOSPITAL | Age: 64
DRG: 097 | End: 2024-11-14
Payer: COMMERCIAL

## 2024-11-14 ENCOUNTER — APPOINTMENT (INPATIENT)
Dept: NEUROLOGY | Facility: CLINIC | Age: 64
DRG: 097 | End: 2024-11-14
Payer: COMMERCIAL

## 2024-11-14 ENCOUNTER — RESULTS FOLLOW-UP (OUTPATIENT)
Dept: EMERGENCY DEPT | Facility: HOSPITAL | Age: 64
End: 2024-11-14

## 2024-11-14 LAB
2HR DELTA HS TROPONIN: -43 NG/L
4HR DELTA HS TROPONIN: -111 NG/L
ALBUMIN SERPL BCG-MCNC: 3 G/DL (ref 3.5–5)
ALP SERPL-CCNC: 99 U/L (ref 34–104)
ALT SERPL W P-5'-P-CCNC: 7 U/L (ref 7–52)
AMMONIA PLAS-SCNC: 55 UMOL/L (ref 18–72)
AMPHETAMINES SERPL QL SCN: NEGATIVE
ANION GAP SERPL CALCULATED.3IONS-SCNC: 14 MMOL/L (ref 4–13)
APTT PPP: 112 SECONDS (ref 23–34)
APTT PPP: 81 SECONDS (ref 23–34)
APTT PPP: 85 SECONDS (ref 23–34)
ARTERIAL PATENCY WRIST A: YES
AST SERPL W P-5'-P-CCNC: 21 U/L (ref 13–39)
ATRIAL RATE: 144 BPM
BACTERIA UR CULT: ABNORMAL
BACTERIA UR CULT: ABNORMAL
BARBITURATES UR QL: NEGATIVE
BASE EX.OXY STD BLDV CALC-SCNC: 75.3 % (ref 60–80)
BASE EXCESS BLDA CALC-SCNC: -2.4 MMOL/L
BASE EXCESS BLDV CALC-SCNC: -1.6 MMOL/L
BASOPHILS # BLD AUTO: 0.05 THOUSANDS/ÂΜL (ref 0–0.1)
BASOPHILS NFR BLD AUTO: 0 % (ref 0–1)
BENZODIAZ UR QL: POSITIVE
BILIRUB SERPL-MCNC: 0.44 MG/DL (ref 0.2–1)
BODY TEMPERATURE: 99.1 DEGREES FEHRENHEIT
BSA FOR ECHO PROCEDURE: 2.12 M2
BUN SERPL-MCNC: 19 MG/DL (ref 5–25)
CALCIUM ALBUM COR SERPL-MCNC: 8.8 MG/DL (ref 8.3–10.1)
CALCIUM SERPL-MCNC: 8 MG/DL (ref 8.4–10.2)
CARDIAC TROPONIN I PNL SERPL HS: 1045 NG/L (ref ?–50)
CARDIAC TROPONIN I PNL SERPL HS: 1113 NG/L (ref ?–50)
CARDIAC TROPONIN I PNL SERPL HS: 1156 NG/L (ref ?–50)
CHLORIDE SERPL-SCNC: 104 MMOL/L (ref 96–108)
CK SERPL-CCNC: 732 U/L (ref 26–192)
CO2 SERPL-SCNC: 21 MMOL/L (ref 21–32)
COCAINE UR QL: NEGATIVE
CORTIS AM PEAK SERPL-MCNC: 13.5 UG/DL (ref 6.7–22.6)
CREAT SERPL-MCNC: 1.7 MG/DL (ref 0.6–1.3)
EOSINOPHIL # BLD AUTO: 0.05 THOUSAND/ÂΜL (ref 0–0.61)
EOSINOPHIL NFR BLD AUTO: 0 % (ref 0–6)
ERYTHROCYTE [DISTWIDTH] IN BLOOD BY AUTOMATED COUNT: 15.7 % (ref 11.6–15.1)
FENTANYL UR QL SCN: NEGATIVE
GFR SERPL CREATININE-BSD FRML MDRD: 31 ML/MIN/1.73SQ M
GLUCOSE SERPL-MCNC: 161 MG/DL (ref 65–140)
HCO3 BLDA-SCNC: 22.5 MMOL/L (ref 22–28)
HCO3 BLDV-SCNC: 23.6 MMOL/L (ref 24–30)
HCT VFR BLD AUTO: 34.1 % (ref 34.8–46.1)
HGB BLD-MCNC: 10.5 G/DL (ref 11.5–15.4)
HIV 1+2 AB+HIV1 P24 AG SERPL QL IA: NORMAL
HIV1 P24 AG SER QL: NORMAL
HOROWITZ INDEX BLDA+IHG-RTO: 40 MM[HG]
HOROWITZ INDEX BLDA+IHG-RTO: 40 MM[HG]
HYDROCODONE UR QL SCN: NEGATIVE
IMM GRANULOCYTES # BLD AUTO: 0.12 THOUSAND/UL (ref 0–0.2)
IMM GRANULOCYTES NFR BLD AUTO: 1 % (ref 0–2)
INR PPP: 1.44 (ref 0.85–1.19)
LACTATE SERPL-SCNC: 2.6 MMOL/L (ref 0.5–2)
LACTATE SERPL-SCNC: 4 MMOL/L (ref 0.5–2)
LACTATE SERPL-SCNC: 5.7 MMOL/L (ref 0.5–2)
LYMPHOCYTES # BLD AUTO: 1.29 THOUSANDS/ÂΜL (ref 0.6–4.47)
LYMPHOCYTES NFR BLD AUTO: 11 % (ref 14–44)
MAGNESIUM SERPL-MCNC: 2.9 MG/DL (ref 1.9–2.7)
MCH RBC QN AUTO: 31.7 PG (ref 26.8–34.3)
MCHC RBC AUTO-ENTMCNC: 30.8 G/DL (ref 31.4–37.4)
MCV RBC AUTO: 103 FL (ref 82–98)
METHADONE UR QL: NEGATIVE
MONOCYTES # BLD AUTO: 0.89 THOUSAND/ÂΜL (ref 0.17–1.22)
MONOCYTES NFR BLD AUTO: 8 % (ref 4–12)
NEUTROPHILS # BLD AUTO: 8.97 THOUSANDS/ÂΜL (ref 1.85–7.62)
NEUTS SEG NFR BLD AUTO: 80 % (ref 43–75)
NRBC BLD AUTO-RTO: 0 /100 WBCS
O2 CT BLDA-SCNC: 15 ML/DL (ref 16–23)
O2 CT BLDV-SCNC: 11.3 ML/DL
OPIATES UR QL SCN: NEGATIVE
OXYCODONE+OXYMORPHONE UR QL SCN: NEGATIVE
OXYHGB MFR BLDA: 95.5 % (ref 94–97)
PCO2 BLD: 41.9 MM HG (ref 42–50)
PCO2 BLDA: 39.5 MM HG (ref 36–44)
PCO2 BLDV: 41.4 MM HG (ref 42–50)
PCP UR QL: NEGATIVE
PEEP RESPIRATORY: 8 CM[H2O]
PEEP RESPIRATORY: 8 CM[H2O]
PH BLD: 7.37 [PH] (ref 7.3–7.4)
PH BLDA: 7.37 [PH] (ref 7.35–7.45)
PH BLDV: 7.37 [PH] (ref 7.3–7.4)
PHENYTOIN SERPL-MCNC: 8.4 UG/ML (ref 10–20)
PLATELET # BLD AUTO: 272 THOUSANDS/UL (ref 149–390)
PMV BLD AUTO: 9.8 FL (ref 8.9–12.7)
PO2 BLDA: 88.1 MM HG (ref 75–129)
PO2 BLDV: 42.1 MM HG (ref 35–45)
PO2 VENOUS TEMP CORRECTED: 43 MM HG (ref 35–45)
POTASSIUM SERPL-SCNC: 3.5 MMOL/L (ref 3.5–5.3)
PROT SERPL-MCNC: 5.9 G/DL (ref 6.4–8.4)
PROTHROMBIN TIME: 17.8 SECONDS (ref 12.3–15)
QRS AXIS: 105 DEGREES
QRSD INTERVAL: 100 MS
QT INTERVAL: 344 MS
QTC INTERVAL: 456 MS
RBC # BLD AUTO: 3.31 MILLION/UL (ref 3.81–5.12)
SODIUM SERPL-SCNC: 139 MMOL/L (ref 135–147)
SPECIMEN SOURCE: ABNORMAL
T WAVE AXIS: 58 DEGREES
T4 FREE SERPL-MCNC: 1.23 NG/DL (ref 0.61–1.12)
THC UR QL: POSITIVE
TRIGL SERPL-MCNC: 757 MG/DL (ref ?–150)
TSH SERPL DL<=0.05 MIU/L-ACNC: 0.29 UIU/ML (ref 0.45–4.5)
VALPROATE SERPL-MCNC: 63 UG/ML (ref 50–100)
VENT AC: 22
VENT AC: 22
VENT- AC: AC
VENT- AC: AC
VENTRICULAR RATE: 106 BPM
VT SETTING VENT: 420 ML
VT SETTING VENT: 420 ML
WBC # BLD AUTO: 11.37 THOUSAND/UL (ref 4.31–10.16)

## 2024-11-14 PROCEDURE — 83735 ASSAY OF MAGNESIUM: CPT

## 2024-11-14 PROCEDURE — 84439 ASSAY OF FREE THYROXINE: CPT | Performed by: PSYCHIATRY & NEUROLOGY

## 2024-11-14 PROCEDURE — NC001 PR NO CHARGE: Performed by: EMERGENCY MEDICINE

## 2024-11-14 PROCEDURE — 93308 TTE F-UP OR LMTD: CPT | Performed by: INTERNAL MEDICINE

## 2024-11-14 PROCEDURE — 85730 THROMBOPLASTIN TIME PARTIAL: CPT | Performed by: PSYCHIATRY & NEUROLOGY

## 2024-11-14 PROCEDURE — 94664 DEMO&/EVAL PT USE INHALER: CPT

## 2024-11-14 PROCEDURE — 82805 BLOOD GASES W/O2 SATURATION: CPT

## 2024-11-14 PROCEDURE — 95715 VEEG EA 12-26HR INTMT MNTR: CPT

## 2024-11-14 PROCEDURE — 80185 ASSAY OF PHENYTOIN TOTAL: CPT | Performed by: PSYCHIATRY & NEUROLOGY

## 2024-11-14 PROCEDURE — 82140 ASSAY OF AMMONIA: CPT | Performed by: PSYCHIATRY & NEUROLOGY

## 2024-11-14 PROCEDURE — 85730 THROMBOPLASTIN TIME PARTIAL: CPT | Performed by: EMERGENCY MEDICINE

## 2024-11-14 PROCEDURE — 93005 ELECTROCARDIOGRAM TRACING: CPT

## 2024-11-14 PROCEDURE — 80053 COMPREHEN METABOLIC PANEL: CPT

## 2024-11-14 PROCEDURE — 94003 VENT MGMT INPAT SUBQ DAY: CPT

## 2024-11-14 PROCEDURE — 93321 DOPPLER ECHO F-UP/LMTD STD: CPT | Performed by: INTERNAL MEDICINE

## 2024-11-14 PROCEDURE — 87806 HIV AG W/HIV1&2 ANTB W/OPTIC: CPT

## 2024-11-14 PROCEDURE — 93321 DOPPLER ECHO F-UP/LMTD STD: CPT

## 2024-11-14 PROCEDURE — 94760 N-INVAS EAR/PLS OXIMETRY 1: CPT

## 2024-11-14 PROCEDURE — 84443 ASSAY THYROID STIM HORMONE: CPT | Performed by: PSYCHIATRY & NEUROLOGY

## 2024-11-14 PROCEDURE — 93010 ELECTROCARDIOGRAM REPORT: CPT | Performed by: INTERNAL MEDICINE

## 2024-11-14 PROCEDURE — 84478 ASSAY OF TRIGLYCERIDES: CPT | Performed by: PSYCHIATRY & NEUROLOGY

## 2024-11-14 PROCEDURE — 82805 BLOOD GASES W/O2 SATURATION: CPT | Performed by: REGISTERED NURSE

## 2024-11-14 PROCEDURE — 85610 PROTHROMBIN TIME: CPT

## 2024-11-14 PROCEDURE — 95720 EEG PHY/QHP EA INCR W/VEEG: CPT | Performed by: PSYCHIATRY & NEUROLOGY

## 2024-11-14 PROCEDURE — 82550 ASSAY OF CK (CPK): CPT

## 2024-11-14 PROCEDURE — 85025 COMPLETE CBC W/AUTO DIFF WBC: CPT

## 2024-11-14 PROCEDURE — 99223 1ST HOSP IP/OBS HIGH 75: CPT | Performed by: STUDENT IN AN ORGANIZED HEALTH CARE EDUCATION/TRAINING PROGRAM

## 2024-11-14 PROCEDURE — 99291 CRITICAL CARE FIRST HOUR: CPT | Performed by: PSYCHIATRY & NEUROLOGY

## 2024-11-14 PROCEDURE — 85730 THROMBOPLASTIN TIME PARTIAL: CPT | Performed by: INTERNAL MEDICINE

## 2024-11-14 PROCEDURE — 82533 TOTAL CORTISOL: CPT

## 2024-11-14 PROCEDURE — 99233 SBSQ HOSP IP/OBS HIGH 50: CPT | Performed by: PSYCHIATRY & NEUROLOGY

## 2024-11-14 PROCEDURE — 80307 DRUG TEST PRSMV CHEM ANLYZR: CPT

## 2024-11-14 PROCEDURE — 93325 DOPPLER ECHO COLOR FLOW MAPG: CPT | Performed by: INTERNAL MEDICINE

## 2024-11-14 PROCEDURE — 84484 ASSAY OF TROPONIN QUANT: CPT | Performed by: REGISTERED NURSE

## 2024-11-14 PROCEDURE — 93325 DOPPLER ECHO COLOR FLOW MAPG: CPT

## 2024-11-14 PROCEDURE — 94640 AIRWAY INHALATION TREATMENT: CPT

## 2024-11-14 PROCEDURE — 80164 ASSAY DIPROPYLACETIC ACD TOT: CPT

## 2024-11-14 PROCEDURE — 83605 ASSAY OF LACTIC ACID: CPT | Performed by: REGISTERED NURSE

## 2024-11-14 PROCEDURE — 93308 TTE F-UP OR LMTD: CPT

## 2024-11-14 RX ORDER — PHENYTOIN 125 MG/5ML
100 SUSPENSION ORAL EVERY 8 HOURS SCHEDULED
Status: DISCONTINUED | OUTPATIENT
Start: 2024-11-14 | End: 2024-11-14

## 2024-11-14 RX ORDER — LANOLIN ALCOHOL/MO/W.PET/CERES
100 CREAM (GRAM) TOPICAL DAILY
Status: DISCONTINUED | OUTPATIENT
Start: 2024-11-20 | End: 2024-11-18

## 2024-11-14 RX ORDER — MAGNESIUM SULFATE HEPTAHYDRATE 40 MG/ML
2 INJECTION, SOLUTION INTRAVENOUS ONCE
Status: COMPLETED | OUTPATIENT
Start: 2024-11-14 | End: 2024-11-14

## 2024-11-14 RX ORDER — POTASSIUM CHLORIDE 29.8 MG/ML
40 INJECTION INTRAVENOUS ONCE
Status: COMPLETED | OUTPATIENT
Start: 2024-11-14 | End: 2024-11-14

## 2024-11-14 RX ORDER — PHENYTOIN 125 MG/5ML
100 SUSPENSION ORAL EVERY 8 HOURS SCHEDULED
Status: DISCONTINUED | OUTPATIENT
Start: 2024-11-15 | End: 2024-11-17

## 2024-11-14 RX ADMIN — MIDAZOLAM 40 MG/HR: 5 INJECTION INTRAMUSCULAR; INTRAVENOUS at 01:49

## 2024-11-14 RX ADMIN — NOREPINEPHRINE BITARTRATE 22 MCG/MIN: 1 INJECTION, SOLUTION, CONCENTRATE INTRAVENOUS at 00:14

## 2024-11-14 RX ADMIN — CEFTRIAXONE SODIUM 1000 MG: 10 INJECTION, POWDER, FOR SOLUTION INTRAVENOUS at 11:46

## 2024-11-14 RX ADMIN — MAGNESIUM SULFATE HEPTAHYDRATE 2 G: 40 INJECTION, SOLUTION INTRAVENOUS at 15:48

## 2024-11-14 RX ADMIN — AMIODARONE HYDROCHLORIDE 1 MG/MIN: 50 INJECTION, SOLUTION INTRAVENOUS at 07:15

## 2024-11-14 RX ADMIN — AMIODARONE HYDROCHLORIDE 1 MG/MIN: 50 INJECTION, SOLUTION INTRAVENOUS at 22:38

## 2024-11-14 RX ADMIN — VALPROATE SODIUM 250 MG: 100 INJECTION, SOLUTION INTRAVENOUS at 21:52

## 2024-11-14 RX ADMIN — IPRATROPIUM BROMIDE 0.5 MG: 0.5 SOLUTION RESPIRATORY (INHALATION) at 13:33

## 2024-11-14 RX ADMIN — VALPROATE SODIUM 250 MG: 100 INJECTION, SOLUTION INTRAVENOUS at 15:52

## 2024-11-14 RX ADMIN — CHLORHEXIDINE GLUCONATE 0.12% ORAL RINSE 15 ML: 1.2 LIQUID ORAL at 08:34

## 2024-11-14 RX ADMIN — ACYCLOVIR SODIUM 775 MG: 50 INJECTION, SOLUTION INTRAVENOUS at 08:25

## 2024-11-14 RX ADMIN — LEVALBUTEROL HYDROCHLORIDE 1.25 MG: 1.25 SOLUTION RESPIRATORY (INHALATION) at 07:18

## 2024-11-14 RX ADMIN — SODIUM CHLORIDE 1100 MG PE: 9 INJECTION, SOLUTION INTRAVENOUS at 18:05

## 2024-11-14 RX ADMIN — MICONAZOLE NITRATE 1 APPLICATION: 20 CREAM TOPICAL at 18:05

## 2024-11-14 RX ADMIN — PROPOFOL 70 MCG/KG/MIN: 10 INJECTION, EMULSION INTRAVENOUS at 00:14

## 2024-11-14 RX ADMIN — MICONAZOLE NITRATE 1 APPLICATION: 20 CREAM TOPICAL at 08:26

## 2024-11-14 RX ADMIN — SENNOSIDES AND DOCUSATE SODIUM 2 TABLET: 50; 8.6 TABLET ORAL at 18:08

## 2024-11-14 RX ADMIN — PROPOFOL 40 MCG/KG/MIN: 10 INJECTION, EMULSION INTRAVENOUS at 05:58

## 2024-11-14 RX ADMIN — VALPROATE SODIUM 250 MG: 100 INJECTION, SOLUTION INTRAVENOUS at 03:32

## 2024-11-14 RX ADMIN — LEVALBUTEROL HYDROCHLORIDE 1.25 MG: 1.25 SOLUTION RESPIRATORY (INHALATION) at 18:56

## 2024-11-14 RX ADMIN — MIDAZOLAM 40 MG/HR: 5 INJECTION INTRAMUSCULAR; INTRAVENOUS at 07:13

## 2024-11-14 RX ADMIN — NOREPINEPHRINE BITARTRATE 18 MCG/MIN: 1 INJECTION, SOLUTION, CONCENTRATE INTRAVENOUS at 21:34

## 2024-11-14 RX ADMIN — MIDAZOLAM 40 MG/HR: 5 INJECTION INTRAMUSCULAR; INTRAVENOUS at 09:47

## 2024-11-14 RX ADMIN — IPRATROPIUM BROMIDE 0.5 MG: 0.5 SOLUTION RESPIRATORY (INHALATION) at 18:56

## 2024-11-14 RX ADMIN — MIDAZOLAM 40 MG/HR: 5 INJECTION INTRAMUSCULAR; INTRAVENOUS at 04:20

## 2024-11-14 RX ADMIN — CHLORHEXIDINE GLUCONATE 0.12% ORAL RINSE 15 ML: 1.2 LIQUID ORAL at 20:33

## 2024-11-14 RX ADMIN — LEVETIRACETAM 1000 MG: 100 INJECTION, SOLUTION INTRAVENOUS at 20:33

## 2024-11-14 RX ADMIN — LEVALBUTEROL HYDROCHLORIDE 1.25 MG: 1.25 SOLUTION RESPIRATORY (INHALATION) at 13:33

## 2024-11-14 RX ADMIN — ATORVASTATIN CALCIUM 10 MG: 10 TABLET, FILM COATED ORAL at 15:51

## 2024-11-14 RX ADMIN — MIDAZOLAM 40 MG/HR: 5 INJECTION INTRAMUSCULAR; INTRAVENOUS at 12:23

## 2024-11-14 RX ADMIN — NOREPINEPHRINE BITARTRATE 18 MCG/MIN: 1 INJECTION, SOLUTION, CONCENTRATE INTRAVENOUS at 18:04

## 2024-11-14 RX ADMIN — SERTRALINE HYDROCHLORIDE 50 MG: 50 TABLET ORAL at 08:34

## 2024-11-14 RX ADMIN — PERFLUTREN 1 ML/MIN: 6.52 INJECTION, SUSPENSION INTRAVENOUS at 16:06

## 2024-11-14 RX ADMIN — NOREPINEPHRINE BITARTRATE 18 MCG/MIN: 1 INJECTION, SOLUTION, CONCENTRATE INTRAVENOUS at 03:23

## 2024-11-14 RX ADMIN — LEVOTHYROXINE SODIUM 100 MCG: 100 TABLET ORAL at 05:51

## 2024-11-14 RX ADMIN — VASOPRESSIN 0.04 UNITS/MIN: 20 INJECTION INTRAVENOUS at 23:56

## 2024-11-14 RX ADMIN — PROPOFOL 40 MCG/KG/MIN: 10 INJECTION, EMULSION INTRAVENOUS at 03:28

## 2024-11-14 RX ADMIN — LEVETIRACETAM 1000 MG: 100 INJECTION, SOLUTION INTRAVENOUS at 08:34

## 2024-11-14 RX ADMIN — ACYCLOVIR SODIUM 775 MG: 50 INJECTION, SOLUTION INTRAVENOUS at 20:33

## 2024-11-14 RX ADMIN — HEPARIN SODIUM 10.1 UNITS/KG/HR: 10000 INJECTION, SOLUTION INTRAVENOUS at 07:17

## 2024-11-14 RX ADMIN — POTASSIUM CHLORIDE 40 MEQ: 29.8 INJECTION, SOLUTION INTRAVENOUS at 07:19

## 2024-11-14 RX ADMIN — THIAMINE HYDROCHLORIDE 500 MG: 100 INJECTION, SOLUTION INTRAMUSCULAR; INTRAVENOUS at 19:38

## 2024-11-14 RX ADMIN — MIDAZOLAM 30 MG/HR: 5 INJECTION INTRAMUSCULAR; INTRAVENOUS at 18:08

## 2024-11-14 RX ADMIN — SENNOSIDES AND DOCUSATE SODIUM 2 TABLET: 50; 8.6 TABLET ORAL at 08:34

## 2024-11-14 RX ADMIN — MIDAZOLAM 30 MG/HR: 5 INJECTION INTRAMUSCULAR; INTRAVENOUS at 21:29

## 2024-11-14 RX ADMIN — IPRATROPIUM BROMIDE 0.5 MG: 0.5 SOLUTION RESPIRATORY (INHALATION) at 07:18

## 2024-11-14 RX ADMIN — SODIUM CHLORIDE 2200 MG PE: 9 INJECTION, SOLUTION INTRAVENOUS at 10:58

## 2024-11-14 RX ADMIN — VASOPRESSIN 0.04 UNITS/MIN: 20 INJECTION INTRAVENOUS at 06:28

## 2024-11-14 RX ADMIN — PANTOPRAZOLE SODIUM 40 MG: 40 INJECTION, POWDER, FOR SOLUTION INTRAVENOUS at 05:51

## 2024-11-14 RX ADMIN — NOREPINEPHRINE BITARTRATE 16 MCG/MIN: 1 INJECTION, SOLUTION, CONCENTRATE INTRAVENOUS at 13:39

## 2024-11-14 RX ADMIN — SODIUM CHLORIDE, SODIUM GLUCONATE, SODIUM ACETATE, POTASSIUM CHLORIDE, MAGNESIUM CHLORIDE, SODIUM PHOSPHATE, DIBASIC, AND POTASSIUM PHOSPHATE 100 ML/HR: .53; .5; .37; .037; .03; .012; .00082 INJECTION, SOLUTION INTRAVENOUS at 01:49

## 2024-11-14 RX ADMIN — MIDAZOLAM 40 MG/HR: 5 INJECTION INTRAMUSCULAR; INTRAVENOUS at 15:14

## 2024-11-14 RX ADMIN — VASOPRESSIN 0.04 UNITS/MIN: 20 INJECTION INTRAVENOUS at 15:48

## 2024-11-14 RX ADMIN — NOREPINEPHRINE BITARTRATE 12 MCG/MIN: 1 INJECTION, SOLUTION, CONCENTRATE INTRAVENOUS at 08:26

## 2024-11-14 RX ADMIN — VALPROATE SODIUM 250 MG: 100 INJECTION, SOLUTION INTRAVENOUS at 10:58

## 2024-11-14 NOTE — ASSESSMENT & PLAN NOTE
"Adelina Soto is a 64 year old woman with PMHx including HTN, HLD, hypothyroidism, A fib on Warfarin, DM, GERD, anxiety who presented to the hospital for evaluation of altered mental status. Stroke alert called due to gaze deviation and altered mentation. Pt was noted to have two witnessed seizures prior to arrival, and received 6 mg Versed prior to arrival. Continued to have left gaze preference for ED, with lack of command following although was moving her LUE spontaneously. CT head and CTA H/N with no findings that could explain her presentation. After return from CT scan, appeared to have some improvement in her exam but still off from her baseline on discussion with sister at bedside. Concern for status epilepticus with multiple seizures without return to baseline as of yet. Otherwise compliant with Coumadin, with INR 2.42.     VEEG monitoring report of 08:00 11/13: 2:20 this morning and around 02:40 started to have repetitive left frontal seizures, every 10 seconds at times. She was started on sedation again shortly after 03:00 and seizures stopped at that point. EEG is now very low amplitude and slow, with frequent left temporal sharps\"    MRI: Limited by motion. Mild restricted diffusion with abnormal FLAIR signal in the mesial left temporal lobe could be postictal. Recommend correlation with CSF to exclude infection such as HSV. Abnormal signal in the right thalamus of uncertain etiology.     Impression: status epilepticus, which required multiple AED & sedation medications to control in setting of no history of seizures & currently unknown etiology, but concern for UTI vs HSV vs meningitis warranting ongoing workup.    TPA Decision: Patient not a candidate. Unclear time of onset outside appropriate time window. and Bleeding risk. Not an endovascular candidate since no LVO/IR target identified.      Plan:   - Cont Keppra 1 g q12 hrs tonight  - Cont valproate 250 Q6 hrs  - video EEG cont  - Rec consider " burst suppression  - Trough total levels in AM  - Free valp trough level 11/15 rec  - Await final CSF results immuno vs HSV  - Seizure precautions  - Telemetry rec  - Will need DMV report  - Urine cx pending, innum danita w/ 10-20 WBC  - Goal of normothermia and euglycemia  - Therapy when able  - Continue home Warfarin  - Systolic BP goal less than 180  - Continue to monitor closely for changes in examination, notify Neurology if so  - Rest of care per primary  - Seizure precautions outpatient: no driving, no operating heavy machinery, no swimming alone, no climbing, no baths only showers, no cooking alone, or any activity that would put them at increased risk of harm if they were to have a seizure.

## 2024-11-14 NOTE — RESPIRATORY THERAPY NOTE
Respiratory Vent Note   11/14/24 0235   Respiratory Assessment   Assessment Type Assess only   General Appearance Sedated   Respiratory Pattern Assisted   Chest Assessment Chest expansion symmetrical   Bilateral Breath Sounds Clear   Resp Comments Pt conts on SCMV vent setting per order, no vent changes made at this time, will cont to flo pt per respiratory protocol order.   O2 Device G5 vent   Vent Information   Vent ID [id number]   Vent type Kathleen G5   Kathleen Vent Mode (S)CMV   $ Vent Daily Charge-Subsequent Yes   $ Pulse Oximetry Spot Check Charge Completed   (S)CMV Settings   Resp Rate (BPM) 22 BPM   VT (mL) 420 mL   FIO2 (%) 40 %   PEEP (cmH2O) 8 cmH2O   I:E Ratio 1:1.7   Insp Time (%) 1 %   Flow Trigger (LPM) 5   Humidification Heater   Heater Temperature (Set) 98.6 °F (37 °C)   (S)CMV Actuals   Resp Rate (BPM) 22 BPM   VT (mL) 432   MV 9.5   MAP (cmH2O) 15 cmH2O   Peak Pressure (cmH2O) 27 cmH2O   I:E Ratio (Obs) 1:1.7   Insp Resistance 19   Heater Temperature (Obs) 98.6 °F (37 °C)   Static Compliance (mL/cmH20) 33.4 mL/cmH2O   (S)CMV Alarms   High Peak Pressure (cmH2O) 40   Low Pressure (cmH2O) 5 cm H2O   High Resp Rate (BPM) 45 BPM   Low Resp Rate (BPM) 8 BPM   High MV (L/min) 20 L/min   Low MV (L/min) 4 L/min   High VT (mL) 1000 mL   Low VT (mL) 250 mL   Apnea Time (s) 20 S   Maintenance   Alarm (pink) cable attached No   Resuscitation bag with peep valve at bedside Yes   Water bag changed No   Circuit changed No   ETT  Cuffed 7.5 mm   Placement Date/Time: 11/12/24 1830   Preoxygenated: Yes  Technique: Video laryngoscopy  Type: Cuffed  Tube Size: 7.5 mm  Laryngoscope: GlideScope  Location: Oral  Placement Verification: Auscultation;Chest x-ray;End tidal CO2  Placed By: Physician  Pl...   Secured at (cm) 23   Measured from Lips   Secured Location Center   Secured by Commercial tube hernandez

## 2024-11-14 NOTE — PROGRESS NOTES
Progress Note - Critical Care/ICU   Name: Adelina Soto 64 y.o. female I MRN: [id number]  Unit/Bed#: ICU 05 I Date of Admission: 11/12/2024   Date of Service: 11/14/2024 I Hospital Day: 2       Assessment & Plan   Neuro:   Diagnosis: refractory status epilepticus  Plan: Suspect secondary to meningitis  Follow-up CSF cultures  Continue  acyclovir  Video EEG monitoring  Continue Keppra, valproate  As needed Ativan  Ketogenic diet  Diagnosis: Pain, sedation  Plan: Propofol, Versed drip  As needed fentanyl  Diagnosis: Depression, anxiety  Plan: Continue home dose Zoloft     CV:   Diagnosis: Atrial fibrillation RVR, hyperlipidemia  Plan: Hold home dose Coumadin  Status post 2 units FFP  Continue home dose statin  Monitor on telemetry  Amio gtt   Continue heparin gtt   Diagnosis: Troponin elevation type II MI secondary to demand ischemia  Plan: Troponin on admission 470 --> 1657  Trend troponin until peaked  EKG with each troponin  ST elevation- cardiology consulted   Diagnosis: Hypotension, history of hypertension  Plan: Hold home dose antihypertensives in setting of hypotension  Levophed, vaso drip to maintain MAP greater than 65  Follow-up echo     Pulm:  Diagnosis: Acute hypoxic respiratory failure  Plan: Maintain vent  Status post bronchoscopy 11/13 for mucous plug  Follow-up culture  Cont nebs     GI:   Diagnosis: GERD  Plan: Continue home dose PPI     :   Diagnosis: CKD 3  Plan: Baseline creatinine 1.7 -1.9  Creatinine on admission 1.7  Trend BUN and creatinine  Strict I&Os      F/E/N:   Isolyte gtt   Replete electrolytes as needed  Tube feeds- ketogenic diet      Heme/Onc:   Diagnosis: DVT prophylaxis  Plan: heparin gtt, SCDs     Endo:   Diagnosis: DM 2  Plan: Sliding scale insulin if needed   Diagnosis: Hypothyroidism  Plan: cont home dose levothyroxine     ID:   Diagnosis: Leukocytosis, meningitis  Plan: Continue acyclovir  F/u PCR   Trend fever curve and WBC  As needed Tylenol for fever     MSK/Skin:    Diagnosis: Moisture associated wounds, pressure injury  Plan: Wound care consulted  Nystatin for groin  Frequent turns repositioning    Disposition: Critical care    ICU Core Measures     Vented Patient  VAP Bundle  VAP bundle ordered     A: Assess, Prevent, and Manage Pain Has pain been assessed? Yes  Need for changes to pain regimen? No   B: Both Spontaneous Awakening Trials (SATs) and Spontaneous Breathing Trials (SBTs) Plan to perform spontaneous awakening trial today? Yes   Plan to perform spontaneous breathing trial today? Yes   Obvious barriers to extubation? Yes   C: Choice of Sedation RASS Goal: 0 Alert and Calm  Need for changes to sedation or analgesia regimen? No   D: Delirium CAM-ICU: Unable to perform secondary to Acute cognitive dysfunction   E: Early Mobility  Plan for early mobility? Yes   F: Family Engagement Plan for family engagement today? Yes       Antibiotic Review: Patient on appropriate coverage based on culture data.     Review of Invasive Devices:    Stringer Plan: Continue for accurate I/O monitoring for 48 hours  Central access plan: Patient has multiple central venous catheters.   Iola Plan: Keep arterial line for hemodynamic monitoring    Prophylaxis:  VTE VTE covered by:  heparin (porcine), Intravenous, 10.1 Units/kg/hr at 11/14/24 0357       Stress Ulcer  covered byfamotidine (PEPCID) 20 mg tablet [[id number]] (Long-Term Med), pantoprazole (PROTONIX) 40 mg tablet [[id number]] (Long-Term Med), pantoprazole (PROTONIX) injection 40 mg [[id number]]         24 Hour Events : afib RVR yesterday requiring amio bolus and gtt, overnight worsening lactic acidosis- given albumin, ST elevation evaluated by cardiology   Subjective       Objective :                   Vitals I/O      Most Recent Min/Max in 24hrs   Temp 99 °F (37.2 °C) Temp  Min: 96.1 °F (35.6 °C)  Max: 100.4 °F (38 °C)   Pulse 92 Pulse  Min: 80  Max: 130   Resp 22 Resp  Min: 17  Max: 23   BP 90/53 BP  Min: 90/53  Max: 90/53   O2 Sat 94  % SpO2  Min: 93 %  Max: 99 %      Intake/Output Summary (Last 24 hours) at 11/14/2024 0419  Last data filed at 11/14/2024 0400  Gross per 24 hour   Intake 8534.79 ml   Output 2840 ml   Net 5694.79 ml       Diet Enteral/Parenteral; Tube Feeding No Oral Diet; Jevity 1.2 Enrrique; Continuous; 20    Invasive Monitoring           Physical Exam   Physical Exam  Vitals and nursing note reviewed.   Eyes:      Comments: Sluggish b/l    Skin:     General: Skin is warm.      Capillary Refill: Capillary refill takes less than 2 seconds.      Findings: Wound present.   HENT:      Head: Normocephalic.      Left Ear: Hearing normal.      Mouth/Throat:      Mouth: Mucous membranes are moist.   Cardiovascular:      Rate and Rhythm: Normal rate.   Musculoskeletal:      Right lower leg: Edema present.      Left lower leg: Edema present.   Abdominal:      Palpations: Abdomen is soft.      Hernia: A hernia is present.   Constitutional:       Appearance: She is well-developed.   Pulmonary:      Breath sounds: Normal breath sounds.   Neurological:      Comments: Unresponsive due to burst suppression    Genitourinary/Anorectal:  Stringer present.        Diagnostic Studies        Lab Results: I have reviewed the following results:     Medications:  Scheduled PRN   acyclovir, 10 mg/kg (Adjusted), Q12H  atorvastatin, 10 mg, Daily With Dinner  chlorhexidine, 15 mL, Q12H LINO  ipratropium, 0.5 mg, TID  levalbuterol, 1.25 mg, TID  levETIRAcetam, 1,000 mg, Q12H LINO  levothyroxine, 100 mcg, Early Morning  midazolam, 2 mg, Once  EARLINE ANTIFUNGAL, , BID  pantoprazole, 40 mg, Q24H LINO  senna-docusate sodium, 2 tablet, BID  sertraline, 50 mg, Daily  valproate sodium, 250 mg, Q6H LINO      acetaminophen, 1,000 mg, Q6H PRN  albuterol, 2.5 mg, Q6H PRN  fentaNYL, 50 mcg, Q1H PRN  LORazepam, 2 mg, Q4H PRN  ondansetron, 4 mg, Q4H PRN       Continuous    amiodarone (CORDARONE) 900 mg in dextrose 5 % 500 mL infusion, 1 mg/min, Last Rate: 1 mg/min (11/13/24  4847)  heparin (porcine), 3-20 Units/kg/hr (Order-Specific), Last Rate: 10.1 Units/kg/hr (11/14/24 0357)  midazolam, 40 mg/hr, Last Rate: 40 mg/hr (11/14/24 0149)  multi-electrolyte, 100 mL/hr, Last Rate: 100 mL/hr (11/14/24 0149)  norepinephrine, 1-30 mcg/min, Last Rate: 16 mcg/min (11/14/24 0331)  propofol, 40 mcg/kg/min, Last Rate: 40 mcg/kg/min (11/14/24 0328)  vasopressin, 0.04 Units/min, Last Rate: 0.04 Units/min (11/13/24 2119)         Labs:   CBC    Recent Labs     11/13/24 0431 11/13/24 2113   WBC 12.62* 15.23*   HGB 10.7* 11.5   HCT 34.9 38.0    328     BMP    Recent Labs     11/13/24 1827 11/13/24 2113   SODIUM 141 141   K 4.1 3.8    107   CO2 23 23   AGAP 12 11   BUN 22 22   CREATININE 1.81* 1.84*   CALCIUM 8.9 8.4       Coags    Recent Labs     11/13/24 0431 11/13/24  1003 11/13/24  1745 11/14/24  0152   INR 1.59* 1.41*  --   --    PTT  --  31 57* 112*        Additional Electrolytes  Recent Labs     11/13/24 0431 11/13/24 1827 11/13/24 2113   MG 1.8* 3.1* 3.6*   PHOS 3.0 2.6 2.5   CAIONIZED 1.17  --  1.13          Blood Gas    Recent Labs     11/13/24 2150   PHART 7.296*   MEA9VMK 43.3   PO2ART 96.1   DOH4FLQ 20.6*   BEART -5.6   SOURCE Line, Arterial     Recent Labs     11/13/24 2114 11/13/24 2150   PHVEN 7.248*  --    HVN2BLW 52.2*  --    PO2VEN 62.4*  --    YUN4DLO 22.3*  --    BEVEN -5.3  --    D7TDIYR 87.3*  --    SOURCE  --  Line, Arterial    LFTs  Recent Labs     11/13/24  0431   ALT 10   AST 28   ALKPHOS 102   ALB 2.9*   TBILI 0.31       Infectious  Recent Labs     11/12/24  2132   PROCALCITONI 0.36*     Glucose  Recent Labs     11/12/24  2228 11/13/24  0431 11/13/24  1827 11/13/24  2113   GLUC 127 112 127 133

## 2024-11-14 NOTE — RESPIRATORY THERAPY NOTE
Resp care   11/14/24 0700   Respiratory Assessment   Assessment Type Pre-treatment   General Appearance Sedated   Respiratory Pattern Assisted   Chest Assessment Chest expansion symmetrical   Bilateral Breath Sounds Coarse  (clear after sx)   Suction ET Tube   Resp Comments Received pt on CMV with settings per flow sheet. Pt  requiring  heavy swedation. No plans for weaning this am.   Vent Information   Vent ID [id number]   Vent type Kathleen G5   Kathleen Vent Mode (S)CMV   $ Pulse Oximetry Spot Check Charge Completed   (S)CMV Settings   Resp Rate (BPM) 22 BPM   VT (mL) 420 mL   FIO2 (%) 40 %   PEEP (cmH2O) 8 cmH2O   Insp Time (%) 1 %   Flow Trigger (LPM) 5   Humidification Heater   Heater Temperature (Set) 97.7 °F (36.5 °C)   (S)CMV Actuals   Resp Rate (BPM) 22 BPM   VT (mL) 425   MV 9.4   MAP (cmH2O) 15 cmH2O   Peak Pressure (cmH2O) 27 cmH2O   I:E Ratio (Obs) 1:1.7   Insp Resistance 20   Heater Temperature (Obs) 97.7 °F (36.5 °C)   Static Compliance (mL/cmH20) 21 mL/cmH2O   Plateau Pressure (cm H2O) 22 cm H2O   (S)CMV Alarms   High Peak Pressure (cmH2O) 40   Low Pressure (cmH2O) 5 cm H2O   High Resp Rate (BPM) 40 BPM   Low Resp Rate (BPM) 88 BPM   High MV (L/min) 20 L/min   Low MV (L/min) 4 L/min   High VT (mL) 1000 mL   Low VT (mL) 200 mL   Apnea Time (s) 20 S   Maintenance   Alarm (pink) cable attached No   Resuscitation bag with peep valve at bedside Yes   Water bag changed No   Circuit changed No   Daily Screen   Patient safety screen outcome: Failed   Not Ready for Weaning due to: Underline problem not resolved   IHI Ventilator Associated Pneumonia Bundle   Daily Awakening Trials Performed Not applicable (Comment)   ETT  Cuffed 7.5 mm   Placement Date/Time: 11/12/24 1830   Preoxygenated: Yes  Technique: Video laryngoscopy  Type: Cuffed  Tube Size: 7.5 mm  Laryngoscope: GlideScope  Location: Oral  Placement Verification: Auscultation;Chest x-ray;End tidal CO2  Placed By: Physician  Pl...   Secured at (cm) 23    Measured from Lips   Secured Location Right   Repositioned Center to Right   Secured by Commercial tube hernandez   Cuff Pressure (color) Green   HI-LO Suction  Intermittent suction   HI-LO Intervention Patent

## 2024-11-14 NOTE — CONSULTS
Consultation - Cardiology   Adelina Soto 64 y.o. female MRN: [id number]  Unit/Bed#: ICU 05 Encounter: [id number]        Inpatient consult to Cardiology     Date/Time  11/14/2024 10:07 PM     Performed by  Miki Schreiber MD   Authorized by  TULIO Dubois             History of Present Illness   Physician Requesting Consult: Reba Velez DO  Reason for Consult / Principal Problem: Acute EKG changes      Assessment:  Principal Problem:    Seizure (HCC)  Active Problems:    Dyslipidemia    Paroxysmal atrial fibrillation (HCC)    COPD with asthma (HCC)    Hypothyroid    GERD (gastroesophageal reflux disease)    Anxiety    Morbid obesity with BMI of 50.0-59.9, adult (HCC)    Depression, recurrent (HCC)    CKD (chronic kidney disease)    Type 2 diabetes mellitus with hyperglycemia, unspecified whether long term insulin use ([facility])      Assessment/ Plan:    Acute EKG changes  Noted to have acute EKG changes as noted below at 22:52, which had improved by 23:53.  Prior EKG changes were likely secondary to baseline artifacts.  Reviewed with on-call attending  Repeat troponin pending  Echocardiogram has difficult windows, therefore unable to assess regional wall motion abnormalities.  EF appeared grossly normal    Troponin elevation non-MI versus type II MI  Initial troponin peaked at 2400 at 10 AM this morning, without ischemic EKG changes  Per reviewing prior CT scans, patient has aortic atherosclerosis/calcifications, and mild coronary calcifications  Troponin elevation could be secondary to non-MI troponin elevation from status epilepticus, rhabdomyolysis.  Type II MI is a possibility as well given presence of coronary artery disease    Paroxysmal atrial fibrillation  Patient has a history of paroxysmal atrial fibrillation, on metoprolol 50 mg twice daily and warfarin  Currently warfarin has been held  On IV amiodarone gtt. 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 Matson.  Appearance of ST elevations were likely secondary to baseline artifact.  Recent EKG shows improvement of ST changes, and does not appear to be ST elevations.  No indication for antiplatelet loading or cardiac cath  Continue IV heparin for A-fib anticoagulation  Agree with IV amiodarone until acute infection is treated  Continue Levophed/vasopressin to maintain a MAP above 65.  Currently on multiple sedative medications which could contribute to hypotension  Home medications currently on hold: Warfarin, Lopressor, Lasix, Norvasc    HPI: Adelina Soto is a 64 y.o. year old female who has a history of hypertension, hyperlipidemia, diabetes mellitus, renal cell cancer status post nephrectomy, CKD who presented to [facility] in with unresponsiveness, and seizure-like activity.  Per chart review, patient has had multiple seizures en route requiring Versed.  On arrival to the ED, patient had left gaze preference with negative CTA head and neck.  MRI was concerning for possible acute HSV infection.  EEG had shown multiple seizures.  She has been intubated, and currently paralyzed/sedated with a GCS of 3 for seizure suppression.  Patient had a lumbar puncture, and is awaiting results, and has been empirically started on antivirals.      She was noted to be in atrial fibrillation, and she was started on amiodarone loading via IV drip.  She is also currently on IV heparin drip for anticoagulation.  He is also on norepinephrine 22 mcg/min and vasopressin.  She also had troponin elevation with a peak of 2400, EKG showed no acute ST changes.  This was thought to be non-MI troponin elevation versus type II MI.    Telemetry showed ST elevations in inferior leads and a twelve-lead EKG was obtained [see below].  Bedside echocardiogram was difficult to interpret due to poor windows.    EKG at 10 AM on  2024      EKG at 22:52 on 2024 when first EKG changes were noted        EKG at 23: 53 on 2024      Review of Systems   Unable to perform ROS: Intubated     Historical Information   Past Medical History:   Diagnosis Date    Anemia     Arthritis     Cancer of kidney (HCC)     Chronic kidney disease     Diabetes mellitus (HCC)     Disease of thyroid gland     HLD (hyperlipidemia)     Hypertension     Ovarian cancer (HCC)     Pneumonia      Past Surgical History:   Procedure Laterality Date    CHOLECYSTECTOMY      CT GUIDED PERC DRAINAGE CATHETER PLACEMENT  2016    GALLBLADDER SURGERY  2004    HYSTERECTOMY  2018    NEPHRECTOMY Right 2018     Social History     Substance and Sexual Activity   Alcohol Use Never    Comment: n/a     Social History     Substance and Sexual Activity   Drug Use Yes    Types: Marijuana    Comment: smokes with girlfriend when anxious     Social History     Tobacco Use   Smoking Status Former    Current packs/day: 0.00    Average packs/day: 3.0 packs/day for 30.0 years (90.0 ttl pk-yrs)    Types: Cigarettes    Start date: 10/22/1980    Quit date: 10/22/2010    Years since quittin.0   Smokeless Tobacco Former    Quit date: 2011   Tobacco Comments    quit approx. 9 years ago     Family History:   Family History   Problem Relation Age of Onset    No Known Problems Mother     No Known Problems Father        Meds/Allergies   all current active meds have been reviewed, current meds:   Current Facility-Administered Medications:     acetaminophen (Ofirmev) injection 1,000 mg, Q6H PRN    acyclovir (ZOVIRAX) 775 mg in sodium chloride 0.9 % 250 mL IVPB, Q12H, Last Rate: Stopped (24)    albuterol inhalation solution 2.5 mg, Q6H PRN    [] amiodarone (CORDARONE) 900 mg in dextrose 5 % 500 mL infusion, Continuous, Last Rate: 1 mg/min (24) **FOLLOWED BY** amiodarone (CORDARONE) 900 mg in dextrose 5 % 500 mL infusion, Continuous, Last  Rate: 1 mg/min (11/13/24 2315)    atorvastatin (LIPITOR) tablet 10 mg, Daily With Dinner    chlorhexidine (PERIDEX) 0.12 % oral rinse 15 mL, Q12H LINO    fentaNYL injection 50 mcg, Q1H PRN    heparin (porcine) 25,000 units in 0.45% NaCl 250 mL infusion (premix), Titrated, Last Rate: 13.1 Units/kg/hr (11/13/24 1851)    ipratropium (ATROVENT) 0.02 % inhalation solution 0.5 mg, TID    levalbuterol (XOPENEX) inhalation solution 1.25 mg, TID    levETIRAcetam (KEPPRA) injection 1,000 mg, Q12H LINO    levothyroxine tablet 100 mcg, Early Morning    LORazepam (ATIVAN) injection 2 mg, Q4H PRN    midazolam (VERSED) 1 mg/mL (DOUBLE CONCENTRATION) 100 mL infusion, Continuous, Last Rate: 40 mg/hr (11/14/24 0020)    midazolam (VERSED) injection 2 mg, Once    moisture barrier miconazole 2% cream (aka EARLINE MOISTURE BARRIER ANTIFUNGAL CREAM), BID    multi-electrolyte (PLASMALYTE-A/ISOLYTE-S PH 7.4) IV solution, Continuous, Last Rate: 100 mL/hr (11/13/24 1611)    norepinephrine (LEVOPHED) 4 mg (STANDARD CONCENTRATION) IV in sodium chloride 0.9% 250 mL, Titrated, Last Rate: 22 mcg/min (11/14/24 0014)    ondansetron (ZOFRAN) injection 4 mg, Q4H PRN    pantoprazole (PROTONIX) injection 40 mg, Q24H LINO    propofol (DIPRIVAN) 1000 mg in 100 mL infusion (premix), Titrated, Last Rate: 40 mcg/kg/min (11/14/24 0020)    senna-docusate sodium (SENOKOT S) 8.6-50 mg per tablet 2 tablet, BID    sertraline (ZOLOFT) tablet 50 mg, Daily    valproate (DEPACON) 250 mg in sodium chloride 0.9 % 50 mL IVPB, Q6H LINO, Last Rate: 250 mg (11/13/24 2152)    vasopressin (PITRESSIN) 20 Units in sodium chloride 0.9 % 100 mL infusion, Continuous, Last Rate: 0.04 Units/min (11/13/24 2119), and PTA meds:   Prior to Admission Medications   Prescriptions Last Dose Informant Patient Reported? Taking?   Blood Glucose Monitoring Suppl (M. STEVES USAace Pro Glucose Meter) SAVANNAH  Self No No   Sig: Use 2 (two) times a day Embrace glucometer, test twice daily   Embrace Lancets Ultra Thin  30G MISC  Self No No   Sig: Use 2 (two) times a day   Patient not taking: Reported on 7/19/2023   LORazepam (ATIVAN) 0.5 mg tablet   No No   Sig: Take 1 tablet (0.5 mg total) by mouth 2 (two) times a day as needed for anxiety   albuterol (2.5 mg/3 mL) 0.083 % nebulizer solution   No No   Sig: Take 3 mL (2.5 mg total) by nebulization every 6 (six) hours as needed for wheezing or shortness of breath   albuterol (PROVENTIL HFA,VENTOLIN HFA) 90 mcg/act inhaler   No No   Sig: Inhale 1 puff every 4 (four) hours as needed for wheezing   amLODIPine (NORVASC) 5 mg tablet  Self No No   Sig: take 1 tablet by mouth once daily   Patient not taking: Reported on 2/29/2024   atorvastatin (LIPITOR) 10 mg tablet   No No   Sig: Take 1 tablet (10 mg total) by mouth daily   cholecalciferol (VITAMIN D3) 400 units tablet   No No   Sig: Take 1 tablet (400 Units total) by mouth daily   collagenase (SANTYL) ointment  Self No No   Sig: Apply topically daily   Patient not taking: Reported on 5/26/2023   famotidine (PEPCID) 20 mg tablet   No No   Sig: Take 1 tablet (20 mg total) by mouth daily   ferrous sulfate 325 (65 Fe) mg tablet   No No   Sig: Take 1 tablet (325 mg total) by mouth daily with breakfast Every other day   furosemide (LASIX) 40 mg tablet  Self No No   Sig: Take 1 tablet (40 mg total) by mouth daily   Patient taking differently: Take 40 mg by mouth daily And and as needed dose   glucose blood (Embrace Blood Glucose Test) test strip  Self No No   Sig: Use as instructed to check sugar bid   Patient not taking: Reported on 5/26/2023   hydrOXYzine HCL (ATARAX) 50 mg tablet   No No   Sig: Take 1 tablet (50 mg total) by mouth daily at bedtime   Patient not taking: Reported on 8/27/2024   ipratropium (ATROVENT) 0.02 % nebulizer solution   No No   Sig: USE 1 VIAL IN NEBULIZER 4 TIMES DAILY   ipratropium-albuterol (DUO-NEB) 0.5-2.5 mg/3 mL nebulizer solution   No No   Sig: Take 3 mL by nebulization every 8 (eight) hours as needed for  "wheezing or shortness of breath   levothyroxine 100 mcg tablet   No No   Sig: Take 1 tablet (100 mcg total) by mouth daily   metoprolol tartrate (LOPRESSOR) 50 mg tablet   No No   Sig: Take 1 tablet (50 mg total) by mouth 2 (two) times a day   nystatin (MYCOSTATIN) cream   No No   Sig: Apply topically 2 (two) times a day   pantoprazole (PROTONIX) 40 mg tablet   No No   Sig: Take 1 tablet (40 mg total) by mouth daily   sertraline (ZOLOFT) 50 mg tablet   No No   Sig: Take 1 tablet (50 mg total) by mouth daily   triamcinolone (KENALOG) 0.1 % ointment   No No   Sig: APPLY TO AFFECTED AREA TWICE A DAY   warfarin (COUMADIN) 3 mg tablet   No No   Sig: Take 1 tablet (3 mg total) by mouth daily   warfarin (Coumadin) 1 mg tablet  Self No No   Sig: Take 1 tablet (1 mg total) by mouth daily   Patient taking differently: Take 1 mg by mouth daily Monday,Wednesday, Friday 3 mg Sunday, Saturday, Tuesday, Thursday 1 mg      Facility-Administered Medications: None     amiodarone (CORDARONE) 900 mg in dextrose 5 % 500 mL infusion, 1 mg/min, Last Rate: 1 mg/min (11/13/24 2151)  heparin (porcine), 3-20 Units/kg/hr (Order-Specific), Last Rate: 13.1 Units/kg/hr (11/13/24 1851)  midazolam, 10 mg/hr, Last Rate: 10 mg/hr (11/13/24 1912)  multi-electrolyte, 100 mL/hr, Last Rate: 100 mL/hr (11/13/24 1611)  norepinephrine, 1-30 mcg/min, Last Rate: 24 mcg/min (11/13/24 2210)  propofol, 70 mcg/kg/min, Last Rate: 70 mcg/kg/min (11/13/24 2205)  vasopressin, 0.04 Units/min, Last Rate: 0.04 Units/min (11/13/24 2119)        Allergies   Allergen Reactions    Nystatin Dermatitis     Per SO shaun it makes her \"more sore\"       Objective   Vitals: Blood pressure 90/53, pulse (!) 120, temperature 99 °F (37.2 °C), resp. rate 21, height 5' 4\" (1.626 m), weight 111 kg (245 lb), SpO2 98%, not currently breastfeeding., Body mass index is 42.05 kg/m².,   Orthostatic Blood Pressures      Flowsheet Row Most Recent Value   Blood Pressure 90/53 filed at 11/13/2024 " 0930          Systolic (24hrs), Av , Min:90 , Max:125     Diastolic (24hrs), Av, Min:53, Max:89      Intake/Output Summary (Last 24 hours) at 2024 2309  Last data filed at 2024 2200  Gross per 24 hour   Intake 7438.87 ml   Output 2360 ml   Net 5078.87 ml       Weight (last 2 days)       Date/Time Weight    24 0930 111 (245)            Invasive Devices       Central Venous Catheter Line  Duration             CVC Central Lines 24 Triple 16cm <1 day              Peripheral Intravenous Line  Duration             Peripheral IV 24 Left Antecubital 1 day              Arterial Line  Duration             Arterial Line 24 Radial 1 day              Drain  Duration             NG/OG/Enteral Tube Orogastric 16 Fr Center mouth 1 day    Urethral Catheter Temperature probe;Latex 16 Fr. 1 day              Airway  Duration             ETT  Cuffed 7.5 mm 1 day                        Physical Exam:   Physical Exam  Vitals and nursing note reviewed.   Constitutional:       Appearance: She is ill-appearing.   Cardiovascular:      Rate and Rhythm: Tachycardia present. Rhythm irregular.      Heart sounds: No murmur heard.     No friction rub.   Pulmonary:      Comments: Today was intubated, on mechanical ventilation  Musculoskeletal:      Right lower leg: Edema present.      Left lower leg: Edema present.   Skin:     General: Skin is warm.   Neurological:      Comments: Paralyzed, GCS 3            Laboratory Results:  Results from last 7 days   Lab Units 24  2113 24  1003   CK TOTAL U/L 1,067* 2,460*       CBC with diff:   Results from last 7 days   Lab Units 24  2113 24  0431 24  2228   WBC Thousand/uL 15.23* 12.62* 17.44*   HEMOGLOBIN g/dL 11.5 10.7* 13.6   HEMATOCRIT % 38.0 34.9 45.1   MCV fL 103* 101* 105*   PLATELETS Thousands/uL 328 270 450*   RBC Million/uL 3.68* 3.46* 4.28   MCH pg 31.3 30.9 31.8   MCHC g/dL 30.3* 30.7* 30.2*   RDW % 15.7* 15.5* 15.4*   MPV  "fL 9.5 10.0 9.8   NRBC AUTO /100 WBCs 0 0 0         CMP:  Results from last 7 days   Lab Units 24   POTASSIUM mmol/L 3.8 4.1 3.5   CHLORIDE mmol/L 107 106 103   CO2 mmol/L 23 23 25   BUN mg/dL 22 22 27*   CREATININE mg/dL 1.84* 1.81* 1.82*   CALCIUM mg/dL 8.4 8.9 8.7   AST U/L  --   --  28   ALT U/L  --   --  10   ALK PHOS U/L  --   --  102   EGFR ml/min/1.73sq m 28 28 28         BMP:  Results from last 7 days   Lab Units 24   POTASSIUM mmol/L 3.8 4.1 3.5   CHLORIDE mmol/L 107 106 103   CO2 mmol/L 23 23 25   BUN mg/dL 22 22 27*   CREATININE mg/dL 1.84* 1.81* 1.82*   CALCIUM mg/dL 8.4 8.9 8.7       BNP:  No results for input(s): \"BNP\" in the last 72 hours.    Magnesium:   Results from last 7 days   Lab Units 24   MAGNESIUM mg/dL 3.6* 3.1* 1.8*       Coags:   Results from last 7 days   Lab Units 24  1745 24  1003 24  2228 24  0817   PTT seconds 57* 31  --   --  47*   INR   --  1.41* 1.59* 2.22* 2.42*       TSH:       Hemoglobin A1C   Results from last 7 days   Lab Units 24   HEMOGLOBIN A1C % 5.7*       Lipid Profile:   Results from last 7 days   Lab Units 24   TRIGLYCERIDES mg/dL 301*   HDL mg/dL 28*       Cardiac testing:   Results for orders placed during the hospital encounter of 20    Echo complete with contrast if indicated    Narrative  [address] [address]  [phone]    Transthoracic Echocardiogram  2D, M-mode, Doppler, and Color Doppler    Study date:  30-Sep-2020    Patient: DHAVAL SANDERS  MR number: [id number]  Account number: [id number]  : 1960  Age: 60 years  Gender: Female  Status: Inpatient  Location: Bedside  Height: 61 in  Weight: 281.4 lb  BP: 122/ 63 mmHg    Indications: SOB    Diagnoses: I50.9 - Heart failure, " unspecified    Sonographer:  PAM Jeffers  Primary Physician:  ANABELL Matt MD  Referring Physician:  Paul Nguyen DO  Group:  [facility]  Interpreting Physician:  Jac Mcallister DO    SUMMARY    LEFT VENTRICLE:  Systolic function was normal by visual assessment. Ejection fraction was estimated in the range of 55 % to 60 %.  There were no regional wall motion abnormalities.  There was mild concentric hypertrophy.    RIGHT VENTRICLE:  The ventricle was dilated.  Wall thickness was moderately increased.    LEFT ATRIUM:  The atrium was moderately dilated.    RIGHT ATRIUM:  The atrium was moderately dilated.    MITRAL VALVE:  There was mild regurgitation.    AORTIC VALVE:  The valve was trileaflet. Leaflets exhibited normal thickness, normal cuspal separation, and sclerosis.    TRICUSPID VALVE:  There was mild regurgitation.  Pulmonary artery systolic pressure was mildly increased.    HISTORY: PRIOR HISTORY: Severe sepsis,A.Fib., COPD,Hypothyroid,chronic kidney disease,HTN,Diabetes,ovarian cancer.    PROCEDURE: The procedure was performed at the bedside. This was a routine study. The transthoracic approach was used. The study included complete 2D imaging, M-mode, complete spectral Doppler, and color Doppler. The heart rate was 91 bpm,  at the start of the study. Images were obtained from the parasternal, apical, subcostal, and suprasternal notch acoustic windows. This was a technically difficult study.    LEFT VENTRICLE: Size was normal. Systolic function was normal by visual assessment. Ejection fraction was estimated in the range of 55 % to 60 %. There were no regional wall motion abnormalities. There was mild concentric hypertrophy.  DOPPLER: The study was not technically sufficient to allow evaluation of LV diastolic function.    RIGHT VENTRICLE: The ventricle was dilated. Systolic function was normal. Wall thickness was moderately increased. DOPPLER: Systolic pressure was  within the normal range.    LEFT ATRIUM: The atrium was moderately dilated.    RIGHT ATRIUM: The atrium was moderately dilated.    MITRAL VALVE: There was annular calcification. Valve structure was normal. There was normal leaflet separation. No echocardiographic evidence for prolapse. DOPPLER: The transmitral velocity was within the normal range. There was no  evidence for stenosis. There was mild regurgitation.    AORTIC VALVE: The valve was trileaflet. Leaflets exhibited normal thickness, normal cuspal separation, and sclerosis. DOPPLER: Transaortic velocity could not be determined. There was no evidence for stenosis. There was no regurgitation.    TRICUSPID VALVE: The valve structure was normal. There was normal leaflet separation. DOPPLER: The transtricuspid velocity was within the normal range. There was mild regurgitation. Pulmonary artery systolic pressure was mildly increased.  Estimated peak PA pressure was 42 mmHg.    PULMONIC VALVE: Leaflets exhibited normal thickness, no calcification, and normal cuspal separation. DOPPLER: The transpulmonic velocity was within the normal range. There was no regurgitation.    PERICARDIUM: There was no thickening. There was no pericardial effusion.    AORTA: The root exhibited normal size.    PULMONARY ARTERY: The size was normal. The morphology appeared normal.    SYSTEM MEASUREMENT TABLES    2D  EF (Teich): 54.78 %    [employer] Accredited Echocardiography Laboratory    Prepared and electronically signed by    Jac Mcallister DO  Signed 30-Sep-2020 16:57:06    No results found for this or any previous visit.    No results found for this or any previous visit.    No results found for this or any previous visit.        Imaging: Results Review Statement: I reviewed radiology reports from this admission including: chest xray, CT head, MRI brain, and Echocardiogram.  Echo complete w/ contrast if indicated  Result Date: 11/13/2024  Narrative:   Left Ventricle:  Left ventricle is not well visualized. Wall thickness is mild to moderately increased. There is mild to moderate concentric hypertrophy. Unable to assess LV EF. Wall motion cannot be accurately assessed.   Right Ventricle: Right ventricle is not well visualized.   Right Atrium: The atrium is dilated.   Tricuspid Valve: There is at least moderate regurgitation.   Prior TTE study available for comparison. Prior study date: 11/16/2022. Changes noted when compared to prior study. Changes include: Unable to compare due to poor quality images for below mentioned reasons in the past. . Technically difficult study. Poor image quality due to poor acoustic windows and lung interference.     US bedside procedure  Result Date: 11/13/2024  Narrative: [id number]    XR chest portable  Result Date: 11/13/2024  Narrative: XR CHEST PORTABLE INDICATION: post bronch. COMPARISON: CXR and CTA neck 11/12/2024, chest CT 12/11/2022. FINDINGS: Right jugular catheter in upper SVC. ET tube 2 cm above the kailey. NG tube below the diaphragm. Marked improvement of aeration of the left lung. No pneumothorax or pleural effusion. Normal cardiomediastinal silhouette. Bones are unremarkable for age. Normal upper abdomen. Chronic elevation of the left diaphragm.     Impression: Marked improvement of the left lung post bronchoscopy with no pneumothorax. Workstation performed: [id number]     X-ray chest 1 view  Result Date: 11/13/2024  Narrative: XR CHEST 1 VIEW INDICATION: Endotracheal tube positioning. COMPARISON: CXR and CTA neck 11/12/2024, chest CT 12/11/2022. FINDINGS: Right jugular catheter in upper SVC. ET tube 2.5 cm above the kailey. NG tube in stomach. Worsening opacification of the left hemithorax with complete loss of aeration of the left lung. Abrupt cut off of the left main bronchus suggestive of mucous plugging. No pneumothorax or pleural effusion. Normal cardiomediastinal silhouette. Bones are unremarkable  for age. Normal upper abdomen. Chronic elevation of the left diaphragm.     Impression: Right jugular catheter in upper SVC with no pneumothorax. ET tube 2.5 cm above the kailey. Worsening opacification of the left hemithorax with complete loss of aeration of the left lung. Abrupt cutoff of the left main bronchus suggests mucous plugging and atelectasis. Pneumonia not excluded in the appropriate clinical setting. Workstation performed: [id number]     XR chest 1 view portable  Result Date: 11/13/2024  Narrative: XR CHEST PORTABLE INDICATION: post intubation. COMPARISON: CXR and CTA neck 11/12/2024, chest CT 12/11/2022. FINDINGS: ET tube 4 cm above the kailey. NG tube below the diaphragm. Moderate left base opacity with loss of the diaphragm. No pneumothorax or pleural effusion. Moderate cardiomegaly. Bones are unremarkable for age. Normal upper abdomen. Chronic elevation of the left diaphragm.     Impression: ET tube 4 cm above the kailey. Moderate left base opacity with loss of the left diaphragm which could be due to atelectasis or pneumonia in the appropriate clinical setting. Workstation performed: [id number]     Bronchoscopy  Result Date: 11/13/2024  Narrative: [facility] [address] [facility] [address] [phone] DATE OF SERVICE: 11/13/24 PHYSICIAN(S): Attending: Reba Velez DO Fellow: Dustin Lugo DO INDICATION: Acute respiratory failure (HCC) POST-OP DIAGNOSIS: See the impression below. PREPROCEDURE: Standard airway preparation completed per respiratory therapy protocol.  Informed consent was obtained. Images reviewed prior to the procedure.  A Time Out was performed. No suspicion or identified risk for TB or other airborne infectious disease; bronchoscopy procedure being performed for diagnostic purposes. PROCEDURE: Bronchoscopy DETAILS OF PROCEDURE: Patient was taken to the procedure room where a time out was performed to confirm correct patient and correct  procedure. The patient underwent moderate sedation, which was administered by a sedation nurse. The patient's blood pressure, heart rate, oxygen, respirations and ECG were monitored throughout the procedure. The patient's estimated blood loss was minimal (<5 mL). The scope was introduced through the endotracheal tube. The procedure was not difficult. The patient tolerated the procedure well. There were no apparent adverse events. Procedure does not include critical care time. ANESTHESIA INFORMATION: ASA: ASA status cannot be found on the log. Anesthesia Type: Anesthesia type cannot be found on the log. FINDINGS: The trachea and main kailey appeared normal. Moderate, generalized edematous and friable mucosa in the left main stem, left upper lobar bronchus, left apical-posterior segment (LB1+2), left upper anterior segment (LB3), lingular lobar bronchus, superior lingular segment (LB4), inferior lingular segment (LB5), left lower lobar bronchus, left superior segment (LB6), left anterior basal segment (LB7+8), left lateral basal segment (LB9), left posterior basal segment (LB10), right main stem, right upper lobar bronchus, right apical segment (RB1), right posterior segment (RB2), right anterior segment (RB3), bronchus intermedius, right middle lobar bronchus, right lateral segment (RB4), right medial segment (RB5), right lower lobar bronchus, right superior segment (RB6), right medial basal segment (RB7), right anterior basal segment (RB8), right lateral basal segment (RB9) and right posterior basal segment (RB10) Moderate, thick and purulent secretions present in the left main stem, left upper lobar bronchus, left apical-posterior segment (LB1+2), left upper anterior segment (LB3), lingular lobar bronchus, superior lingular segment (LB4), inferior lingular segment (LB5), left lower lobar bronchus, left superior segment (LB6), left anterior basal segment (LB7+8), left lateral basal segment (LB9) and left posterior  basal segment (LB10); secretions were not easily removed and the airway was cleared; performed washing using 99 mL of saline, sent sample for microbiology analysis SPECIMENS: Washings      Impression: RECOMMENDATION: Continue mechanical ventilation, wean as able. Airway clearance with nebulizer and frequent suctioning, consider vest therapy. HOB >30 degrees. Follow-up washing cultures. Abx as indicated. Dustin Lugo, DO PGY-5, Pulmonary/[facility]     MRI brain wo contrast  Result Date: 11/12/2024  Narrative: MRI BRAIN WITHOUT CONTRAST INDICATION: stroke. Seizure. COMPARISON:   CT/CTA from earlier today. TECHNIQUE:  Multiplanar, multisequence imaging of the brain was performed. IMAGE QUALITY: Limited by motion. FINDINGS: BRAIN PARENCHYMA: There is mild restricted diffusion in the mesial left temporal lobe involving the hippocampus and amygdala with increased FLAIR hyperintensity. This could be postictal but recommend correlation with CSF to exclude herpes encephalitis. No other restricted diffusion. There is abnormal T2 signal in the right thalamus without restricted diffusion. Etiology uncertain. Underlying lesion or infection must be excluded. Additional scattered T2/FLAIR hyperintensities in the periventricular and subcortical white matter are nonspecific but may be due to chronic microangiopathy. No acute hemorrhage, mass effect, shift or herniation. VENTRICLES:  Normal for the patient's age. SELLA AND PITUITARY GLAND:  Normal. ORBITS: Bilateral lens replacement. PARANASAL SINUSES:  Normal. VASCULATURE:  Evaluation of the major intracranial vasculature demonstrates appropriate flow voids. CALVARIUM AND SKULL BASE:  Normal. EXTRACRANIAL SOFT TISSUES:  Normal.     Impression: Limited by motion. Mild restricted diffusion with abnormal FLAIR signal in the mesial left temporal lobe could be postictal. Recommend correlation with CSF to exclude infection such as HSV. Abnormal signal in the right thalamus of  uncertain etiology. Recommend repeat MRI with contrast. The study was marked in EPIC for immediate notification. Workstation performed: [id number]     X-ray chest 1 view portable  Result Date: 11/12/2024  Narrative: XR CHEST PORTABLE INDICATION: AMS. Stroke alert. COMPARISON: CXR and chest CT 12/11/2022. FINDINGS: Low lung volumes producing vascular crowding. Mild right base atelectasis. No pneumothorax or pleural effusion. Normal cardiomediastinal silhouette. Bones are unremarkable for age. Moderate left humeral degenerative disease. Normal upper abdomen.     Impression: No acute disease with mild right base atelectasis. Workstation performed: [id number]     CT stroke alert brain  Result Date: 11/12/2024  Narrative: CT BRAIN - STROKE ALERT PROTOCOL INDICATION:   Stroke Alert. COMPARISON: 12/14/2022 TECHNIQUE:  CT examination of the brain was performed.  In addition to axial images, coronal reformatted images were created and submitted for interpretation. Radiation dose length product (DLP) for this visit: .  This examination, like all CT scans performed in the [facility], was performed utilizing techniques to minimize radiation dose exposure, including the use of iterative reconstruction  and automated exposure control. IMAGE QUALITY: Images are mildly degraded by motion artifact. FINDINGS: PARENCHYMA:  No intracranial mass, mass effect or midline shift. No CT signs of acute infarction.  No acute parenchymal hemorrhage. Normal intracranial vasculature. VENTRICLES AND EXTRA-AXIAL SPACES: No hydrocephalus noted. VISUALIZED ORBITS: Normal visualized orbits. PARANASAL SINUSES: Normal visualized paranasal sinuses. CALVARIUM AND EXTRACRANIAL SOFT TISSUES:   Normal.     Impression: No acute intracranial abnormality. Findings were directly discussed with Americo Thompson at approximately 8:12 a.m. on 11/12/2024. Workstation performed: [id number]     CTA stroke alert (head/neck)  Result Date:  11/12/2024  Narrative: CTA NECK AND BRAIN WITH CONTRAST INDICATION: Stroke Alert COMPARISON:   Noncon head CT from 11/12/2024, 12/14/2022 TECHNIQUE:  Post contrast imaging was performed after administration of iodinated contrast through the neck and brain. Post contrast axial 0.625 mm images timed to opacify the arterial system.  3D rendering was performed on an independent workstation.   MIP reconstructions performed. Coronal and sagittal reconstructions were performed of the non contrast portion of the brain. Radiation dose length product (DLP) for this visit: .  This examination, like all CT scans performed in the [facility], was performed utilizing techniques to minimize radiation dose exposure, including the use of iterative reconstruction  and automated exposure control. IV Contrast: Administered IMAGE QUALITY:   Images mildly degraded by motion artifact as well as due to quantum mottling from the patient's body habitus. FINDINGS: CTA NECK ARCH AND GREAT VESSELS: Mixed atherosclerotic plaque noted involving the visualized aortic arch extending into the great vessel origins. Configuration of the great vessel origins is typical. VERTEBRAL ARTERIES: Mild calcified atherosclerotic plaque involving the proximal to mid right cervical vertebral artery, resulting in less than 50% stenosis. The left vertebral artery is patent without plaque, stenosis. No dissection noted. No occlusion noted. RIGHT CAROTID: The right carotid bifurcation/bulb is mildly degraded by motion artifact. Predominantly calcified atherosclerotic plaque at the right carotid bifurcation/carotid bulb, resulting in approximately 40% stenosis of the right cervical internal carotid artery. No occlusion noted.   No dissection. LEFT CAROTID: The left carotid bifurcation/carotid bulb is mildly degraded by motion artifact. Predominant calcified atherosclerotic plaque at the left carotid bifurcation/carotid bulb, resulting in approximately  40% stenosis of the left cervical internal carotid artery. Distal left cervical internal carotid artery is tortuous. No dissection or occlusion noted. NASCET criteria was used to determine the degree of internal carotid artery diameter stenosis. CTA BRAIN: INTERNAL CAROTID ARTERIES: Calcified atherosclerotic plaque involving the bilateral cavernous internal carotid arteries, resulting in mild stenosis. No occlusion noted. ANTERIOR CEREBRAL ARTERY CIRCULATION:  No stenosis or occlusion. MIDDLE CEREBRAL ARTERY CIRCULATION:  No stenosis or occlusion. DISTAL VERTEBRAL ARTERIES: Mild calcified atherosclerotic plaque involving the intradural right vertebral artery. No stenosis identified. No occlusion noted involving the vertebral arteries. BASILAR ARTERY:  No stenosis or occlusion. POSTERIOR CEREBRAL ARTERIES: No stenosis or occlusion. Large right posterior communicating artery noted. VENOUS STRUCTURES:  Normal. NON VASCULAR ANATOMY BONY STRUCTURES: Multilevel cervical spondylosis. Multiple missing teeth noted. Periapical lucency noted surrounding the root of the residual left maxillary tooth. SOFT TISSUES OF THE NECK:  Normal. THORACIC INLET: Visualized lung apices are clear. Mildly enlarged main pulmonary artery indicative of pulmonary hypertension.     Impression: No large vessel occlusion, high-grade stenosis, or intracranial aneurysm identified on CT angiogram of the head. CT angiogram of the neck mild degraded by motion artifact particularly at the level of the carotid bifurcations and carotid bulbs, but there is at most approximately 40 to 50% stenosis of the bilateral cervical internal carotid arteries. No significant vertebral artery stenosis noted. No dissection identified. Mildly enlarged main pulmonary artery, indicative of pulmonary hypertension. Findings were directly discussed with Americo Thompson at 8:21 a.m. on 11/12/2024. Workstation performed: [id number]       Telemetry reviewed personally: Gregoria  fibrillation        Code Status: Level 1 - Full Code

## 2024-11-14 NOTE — CASE MANAGEMENT
Case Management Discharge Planning Note    [facility] [id number]  : 1960 Date 2024       Current Admission Date: 2024  Current Admission Diagnosis:Seizure (HCC)   Patient Active Problem List    Diagnosis Date Noted Date Diagnosed    Seizure (HCC) 2024     Diabetic nephropathy associated with type 2 diabetes mellitus (HCC) 2024     Coagulopathy (HCC) 2024     Type 2 diabetes mellitus with hyperglycemia, unspecified whether long term insulin use (HCC) 2024     Chronic anticoagulation 2023     Chronic respiratory failure with hypercapnia (HCC) 2023     CKD (chronic kidney disease) 2023     Anemia in stage 3b chronic kidney disease  (HCC) 2023     Chronic respiratory failure with hypoxia (HCC) 2023     History of hysterectomy 2022     Panniculitis 2022     Abnormal CT scan 2022     Panniculus 2022     Bilateral pleural effusion 2022     Cardiomegaly 2022     Depression, recurrent (HCC) 2022     Pre-ulcerative corn or callous 2022     Morbid obesity with BMI of 50.0-59.9, adult (HCC) 2021     Secondary hyperparathyroidism of renal origin (HCC) 2021     GERD (gastroesophageal reflux disease) 2020     Anxiety 2020     Chronic constipation 2020     Paroxysmal atrial fibrillation (HCC) 2020     COPD with asthma (HCC) 2020     Hypothyroid 2020     Cellulitis 09/10/2020     H/O right nephrectomy 09/10/2020     Hyperparathyroidism (HCC) 2020     Dyslipidemia 2019     Hypertensive chronic kidney disease with stage 1 through stage 4 chronic kidney disease, or unspecified chronic kidney disease 10/04/2018     Persistent proteinuria 10/04/2018     Iron deficiency 10/04/2018       LOS (days): 2  Geometric Mean LOS (GMLOS) (days): 4.4  Days to GMLOS:2.9     OBJECTIVE:  Risk of Unplanned Readmission Score:  27.23         Current admission status: Inpatient   Preferred Pharmacy:   [facility]/pharmacy #[id number] - [place] - [address]  [address] [address]  Phone: [phone] Fax: [phone]    [facility] - [place], [place] - [address]  [address]  [address]  [place] [address]  Phone: [phone] Fax: [phone]    Primary Care Provider: TULIO Be[place]    Primary Insurance: [facility] RE[facility]  Secondary Insurance: [facility]    DISCHARGE DETAILS:    Vaibhav [facility] coordinator Shaista [phone] requesting call back    TCT Arnulfo left pt remains IP in ICU

## 2024-11-14 NOTE — PROGRESS NOTES
"Progress Note - Neurology   Name: Adelina Soto 64 y.o. female I MRN: [id number]  Unit/Bed#: ICU 05 I Date of Admission: 11/12/2024   Date of Service: 11/14/2024 I Hospital Day: 2    Assessment & Plan  Seizure (HCC)  Adelina Soto is a 64 year old woman with PMHx including HTN, HLD, hypothyroidism, A fib on Warfarin, DM, GERD, anxiety who presented to the hospital for evaluation of altered mental status. Stroke alert called due to gaze deviation and altered mentation. Pt was noted to have two witnessed seizures prior to arrival, and received 6 mg Versed prior to arrival. Continued to have left gaze preference for ED, with lack of command following although was moving her LUE spontaneously. CT head and CTA H/N with no findings that could explain her presentation. After return from CT scan, appeared to have some improvement in her exam but still off from her baseline on discussion with sister at bedside. Concern for status epilepticus with multiple seizures without return to baseline as of yet. Otherwise compliant with Coumadin, with INR 2.42.     VEEG monitoring report of 08:00 11/13: 2:20 this morning and around 02:40 started to have repetitive left frontal seizures, every 10 seconds at times. She was started on sedation again shortly after 03:00 and seizures stopped at that point. EEG is now very low amplitude and slow, with frequent left temporal sharps\"    MRI: Limited by motion. Mild restricted diffusion with abnormal FLAIR signal in the mesial left temporal lobe could be postictal. Recommend correlation with CSF to exclude infection such as HSV. Abnormal signal in the right thalamus of uncertain etiology.     Impression: status epilepticus, which required multiple AED & sedation medications to control in setting of no history of seizures & currently unknown etiology, but concern for UTI vs HSV vs meningitis warranting ongoing workup.    TPA Decision: Patient not a candidate. Unclear time of onset outside " appropriate time window. and Bleeding risk. Not an endovascular candidate since no LVO/IR target identified.      Plan:   - Cont Keppra 1 g q12 hrs tonight  - Cont valproate 250 Q6 hrs  - video EEG cont  - Rec consider burst suppression  - Trough total levels in AM  - Free valp trough level 11/15 rec  - Await final CSF results immuno vs HSV  - Seizure precautions  - Telemetry rec  - Will need DMV report  - Urine cx pending, innum danita w/ 10-20 WBC  - Goal of normothermia and euglycemia  - Therapy when able  - Continue home Warfarin  - Systolic BP goal less than 180  - Continue to monitor closely for changes in examination, notify Neurology if so  - Rest of care per primary  - Seizure precautions outpatient: no driving, no operating heavy machinery, no swimming alone, no climbing, no baths only showers, no cooking alone, or any activity that would put them at increased risk of harm if they were to have a seizure.    Recommendations for outpatient neurological follow up have yet to be determined.  I have discussed with Dr. Bazan the above plan to treat pt. He agrees with the plan.  Please contact the SecureChat role for the Neurology service with any questions/concerns.    Subjective   Intubated    Review of Systems   Unable to perform ROS: Intubated         Objective :  Temp:  [99 °F (37.2 °C)-100 °F (37.8 °C)] 99.7 °F (37.6 °C)  HR:  [] 104  BP: (112)/(56) 112/56  Resp:  [12-22] 21  SpO2:  [91 %-98 %] 95 %  O2 Device: Ventilator    Physical Exam  Constitutional:       General: She is not in acute distress.     Appearance: She is toxic-appearing. She is not diaphoretic.   HENT:      Head: Normocephalic.      Right Ear: External ear normal.      Left Ear: External ear normal.      Nose: Nose normal.   Eyes:      General: No scleral icterus.        Right eye: No discharge.         Left eye: No discharge.      Conjunctiva/sclera: Conjunctivae normal.   Neurological:      Mental Status: She is disoriented.      Neurological Exam  Mental Status  Responsive to painful stimuli. Patient is nonverbal.    Cranial Nerves  Intubated.    Motor  Normal muscle bulk throughout. No fasciculations present. Normal muscle tone. No abnormal involuntary movements.  No movement to pain .    Sensory  Intubated.    Reflexes  Deep tendon reflexes are 2+ and symmetric except as noted.    Coordination    Intubated.    Gait    Intubated.        Lab Results: I have reviewed the following results:  Imaging Results Review: I personally reviewed the following image studies in PACS and associated radiology reports: MRI brain. My interpretation of the radiology images/reports is: postictal findings.    VTE Pharmacologic Prophylaxis: VTE covered by:  heparin (porcine), Intravenous, 10.1 Units/kg/hr at 11/14/24 6489        Administrative Statements   I have spent a total time of 50 minutes in caring for this patient on the day of the visit/encounter including Diagnostic results, Counseling / Coordination of care, Documenting in the medical record, Reviewing / ordering tests, medicine, procedures  , Obtaining or reviewing history  , and Communicating with other healthcare professionals .

## 2024-11-14 NOTE — PROGRESS NOTES
"Patient required escalation of vasopressors.  Is currently on norepinephrine at 24 mcg/min as well as vasopressin at 0.04 units/min.  Laboratory data was checked as patient was having persistent hypotension.  She was also administered albumin for which she had transient improvement in her hemodynamics.  Her pH is 7.296 with a base deficit of -5.6.  This is significantly increased from earlier today where her base deficit was -0.7.  She is becoming more acidotic.  In reviewing laboratory data patient has an anion gap of 11-12 and a bicarb of 23.  Her baseline labs patient is normal bicarb ranges from 30-33 with an anion gap of 4-6.  Patient appears to have a gap metabolic acidosis.  Her lactic acid level remains persistently high at 5.7 which may account for her findings on her chemistry.  Also cannot rule out the possibility of worsening acidosis related to propofol as she is at 80 mcg/kg/min.  Plan to uptitrate Versed infusion to 40 mg and decrease propofol to 40 mcg infusion.  Monitor for signs of persistent acidosis or worsening renal failure that may be consistent with propofol infusion syndrome.  Attempt to wean off propofol.  Currently patient is bur suppressed appropriately.  Maintain burst suppression.    When patient was evaluated she was noted to have abnormalities on her telemetry.  An EKG was obtained which showed concern for ST elevations inferiorly with ST depressions laterally.  A repeat EKG was obtained with changing of leads due to concern for artifact.  After changing leads patient had a similar appearance.  Cardiology was contacted for evaluation due to concern for ST elevation.  Cardiology did a bedside echocardiogram and the fellow discussed with the attending.  Cardiology feels that EKG findings are \"artifact \".  No intervention recommended at this time.    Currently patient is on amiodarone as well as heparin for atrial fibrillation.  Will continue with current regimen.    Trend troponins.  " Recheck EKG in a.m.    Critical care time does not include procedures or family update.  Critical care time 40 minutes.

## 2024-11-14 NOTE — CONSULTS
Consult for status epilepticus.     PT is intubated, on propofol at 26.9mL/hr provides 710cal. Reviewed labs: 11/14 Mg 2.9, random , creat 1.70, triglycerides 757, 11/13 triglycerides 301. Due to triglycerides being 757 would recommend weaning off propofol and consider precedex. TF initiated via OG tube on Jevity 1.2@20mL/hr. PT is meeting calories needs with current propofol and TF but not protein needs. Goal TF if not starting Ketogenic diet for status eplipeticus protocol and off propofol would be Vital 1.2@55mL/hr x 22hrs (d/t hold time 1hr before and after levothyroxine), provides total of volume 1210mL, 1452cal, 91g pro, 981mL, water flushes per critical care or consider minimum of 30mL every 4hrs for tube patency.     If ketogenic diet protocol initiated would not begin keto formula until propofol d/davida and triglycerides improved. Check other baseline labs per ketogenic protocol: Amylase, Lipase, 25-Hydroxy, Vitamin-D, Zinc, Serum carnitine profile. Will monitor medications with CHO content. If and once able to initiate TF on ketogenic diet protocol recommend Ketovie 4:1 ratio@39.75mL/hr x 22hrs, add 8 packets of beneprotein, provides total volume 875mL, 1530cal, 78g pro, 17.5g CHO, 683mL, water flushes per critical care.

## 2024-11-14 NOTE — CONSULTS
Consultation - Infectious Disease   Adelina Soto 64 y.o. female MRN: [id number]  Unit/Bed#: ICU 05 Encounter: [id number]      IMPRESSION & RECOMMENDATIONS:   64-year-old female with PMH of  hypertension, hyperlipidemia, A-fib on Coumadin, morbid obesity, DM 2, hypothyroidism presenting at [facility] with AMS and seizure alike activity by her partner. MRI concerned for HSV encephalitis    Refractory Status Epileptics  Patient initially presented with AMS and seizure-like-activity. Noticed to have left-gaze preference and stroke alert was called. CT head was negative. 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 Lumbar puncture was done and labs were sent. B cx were negative and Urine cx positive for E coli with no symptoms. Patient ws noticed to have 15 episodes in 20 minutes on vEEG.    -CSF labs:  WBC 2,    Glucose 89  Protein 74    -Started on IV acyclovir  -Labs not concerned for HSV encephalitis but could be early phase of infection.  -Recommend MRI brain with contrast  -Recommend CT chest abdomen to r/o any malignancy or inflammatory process.  -Might night repeat LP on Saturday.    Meningitis/encephalitis: Negative  HSV Pending  [place] virus pending   S/p Intubation and mechanical ventilation  Afib with RVR  Hypotension  Known history of hypertension      I have discussed the above management plan in detail with the primary service    I have performed an extensive review of the medical records in Epic including review of the notes, radiographs, and laboratory results     HISTORY OF PRESENT ILLNESS:  Reason for Consult: Concern for HV Encephalitis  HPI: Adelina Soto is a 64-year-old female with PMH of  hypertension, hyperlipidemia, A-fib on Coumadin, morbid obesity, DM 2, hypothyroidism presenting at [facility] with AMS and seizure alike activity by her partner. While she was enroute to ED, EMS witnessed tonic clonic seziures and was given versed. In the ED,  "noticed to have left gaze preference and stroke alert was called. CT Head and neck negative for acute pathology. MRI without contrast concerned for temporal mesial Acute HSV encephalitis. S/p lumbar puncture.   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 She has a history of nursing home stay for 2 months in . No reported cold sore by her partner.        REVIEW OF SYSTEMS:  A complete review of systems is negative other than that noted in the HPI.    PAST MEDICAL HISTORY:  Past Medical History:   Diagnosis Date    Anemia     Arthritis     Cancer of kidney (HCC)     Chronic kidney disease     Diabetes mellitus (HCC)     Disease of thyroid gland     HLD (hyperlipidemia)     Hypertension     Ovarian cancer (HCC)     Pneumonia      Past Surgical History:   Procedure Laterality Date    CHOLECYSTECTOMY      CT GUIDED PERC DRAINAGE CATHETER PLACEMENT  2016    GALLBLADDER SURGERY  2004    HYSTERECTOMY  2018    NEPHRECTOMY Right 2018       FAMILY HISTORY:  Non-contributory    SOCIAL HISTORY:  Social History   Social History     Substance and Sexual Activity   Alcohol Use Never    Comment: n/a     Social History     Substance and Sexual Activity   Drug Use Yes    Types: Marijuana    Comment: smokes with girlfriend when anxious     Social History     Tobacco Use   Smoking Status Former    Current packs/day: 0.00    Average packs/day: 3.0 packs/day for 30.0 years (90.0 ttl pk-yrs)    Types: Cigarettes    Start date: 10/22/1980    Quit date: 10/22/2010    Years since quittin.0   Smokeless Tobacco Former    Quit date: 2011   Tobacco Comments    quit approx. 9 years ago       ALLERGIES:  Allergies   Allergen Reactions    Nystatin Dermatitis     Per SO shaun it makes her \"more sore\"       MEDICATIONS:  All current active medications have been reviewed.      PHYSICAL EXAM:  Temp:  [99 °F (37.2 °C)-100.4 °F (38 °C)] 99.7 °F (37.6 °C)  HR:  [] 102  Resp:  [12-22] 22  BP: (112)/(56) 112/56  SpO2: "  [91 %-98 %] 95 %  Temp (24hrs), Av.4 °F (37.4 °C), Min:99 °F (37.2 °C), Max:100.4 °F (38 °C)  Current: Temperature: 99.7 °F (37.6 °C)    Intake/Output Summary (Last 24 hours) at 2024 1632  Last data filed at 2024 1600  Gross per 24 hour   Intake 8736.39 ml   Output 1730 ml   Net 7006.39 ml       General Appearance:  Intubated   Head:  Normocephalic, without obvious abnormality, atraumatic   Eyes:  Conjunctiva pink and sclera anicteric, both eyes   Nose: Nares normal, mucosa normal, no drainage   Throat: Oropharynx moist without lesions   Neck: Supple, symmetrical, no adenopathy, no tenderness/mass/nodules   Back:   Symmetric, no curvature, ROM normal, no CVA tenderness   Lungs:   Clear to auscultation bilaterally, respirations unlabored   Chest Wall:  No tenderness or deformity   Heart:  RRR; no murmur, rub or gallop   Abdomen:   Soft, non-tender, non-distended, positive bowel sounds    Extremities: No cyanosis, clubbing or edema   Skin: No rashes or lesions. No draining wounds noted.   Lymph nodes: Cervical, supraclavicular nodes normal   Neurologic: Intubated,sedated       LABS, IMAGING, & OTHER STUDIES:  Lab Results:  I have personally reviewed pertinent labs.  Results from last 7 days   Lab Units 241   WBC Thousand/uL 11.37* 15.23* 12.62*   HEMOGLOBIN g/dL 10.5* 11.5 10.7*   PLATELETS Thousands/uL 272 328 270     Results from last 7 days   Lab Units 24  0431   SODIUM mmol/L 139 141 141 140   POTASSIUM mmol/L 3.5 3.8 4.1 3.5   CHLORIDE mmol/L 104 107 106 103   CO2 mmol/L 21 23 23 25   BUN mg/dL 19 22 22 27*   CREATININE mg/dL 1.70* 1.84* 1.81* 1.82*   EGFR ml/min/1.73sq m 31 28 28 28   CALCIUM mg/dL 8.0* 8.4 8.9 8.7   AST U/L 21  --   --  28   ALT U/L 7  --   --  10   ALK PHOS U/L 99  --   --  102     Results from last 7 days   Lab Units 24  2353 24  2238 24  2228 24  2039   BLOOD  CULTURE   --  No Growth at 24 hrs. No Growth at 24 hrs.  --    GRAM STAIN RESULT  1+ Gram negative rods*  1+ Gram positive cocci in clusters*  1+ Polys*  --   --   --    URINE CULTURE   --   --   --  >100,000 cfu/ml Escherichia coli*     Results from last 7 days   Lab Units 11/12/24  2132   PROCALCITONIN ng/ml 0.36*                   Imaging Studies:   I have personally reviewed pertinent imaging study reports and images in PACS.      Other Studies:   I have personally reviewed pertinent reports.      Mary Lou Rendon MD  [facility]  Internal Medicine Residency-PGY2

## 2024-11-15 ENCOUNTER — APPOINTMENT (INPATIENT)
Dept: NEUROLOGY | Facility: CLINIC | Age: 64
DRG: 097 | End: 2024-11-15
Payer: COMMERCIAL

## 2024-11-15 PROBLEM — R82.71 BACTERIURIA: Status: ACTIVE | Noted: 2024-11-15

## 2024-11-15 PROBLEM — R79.89 ELEVATED TROPONIN LEVEL NOT DUE MYOCARDIAL INFARCTION: Status: ACTIVE | Noted: 2024-11-15

## 2024-11-15 PROBLEM — J96.01 ACUTE HYPOXIC RESPIRATORY FAILURE (HCC): Status: ACTIVE | Noted: 2024-11-15

## 2024-11-15 PROBLEM — N39.0 UTI (URINARY TRACT INFECTION): Status: ACTIVE | Noted: 2024-11-15

## 2024-11-15 PROBLEM — R57.9 SHOCK (HCC): Status: ACTIVE | Noted: 2024-11-15

## 2024-11-15 PROBLEM — R94.31 ABNORMAL EKG: Status: ACTIVE | Noted: 2024-11-15

## 2024-11-15 LAB
ALBUMIN SERPL BCG-MCNC: 2.5 G/DL (ref 3.5–5)
ALP SERPL-CCNC: 95 U/L (ref 34–104)
ALT SERPL W P-5'-P-CCNC: 6 U/L (ref 7–52)
ANION GAP SERPL CALCULATED.3IONS-SCNC: 8 MMOL/L (ref 4–13)
AORTIC ROOT: 3.4 CM
APTT PPP: 71 SECONDS (ref 23–34)
AST SERPL W P-5'-P-CCNC: 16 U/L (ref 13–39)
BACTERIA BRONCH AEROBE CULT: ABNORMAL
BASOPHILS # BLD AUTO: 0.06 THOUSANDS/ÂΜL (ref 0–0.1)
BASOPHILS NFR BLD AUTO: 1 % (ref 0–1)
BILIRUB SERPL-MCNC: 0.6 MG/DL (ref 0.2–1)
BSA FOR ECHO PROCEDURE: 2.12 M2
BUN SERPL-MCNC: 14 MG/DL (ref 5–25)
CALCIUM ALBUM COR SERPL-MCNC: 8.6 MG/DL (ref 8.3–10.1)
CALCIUM SERPL-MCNC: 7.4 MG/DL (ref 8.4–10.2)
CHLORIDE SERPL-SCNC: 108 MMOL/L (ref 96–108)
CO2 SERPL-SCNC: 22 MMOL/L (ref 21–32)
CREAT SERPL-MCNC: 1.46 MG/DL (ref 0.6–1.3)
EOSINOPHIL # BLD AUTO: 0.08 THOUSAND/ÂΜL (ref 0–0.61)
EOSINOPHIL NFR BLD AUTO: 1 % (ref 0–6)
ERYTHROCYTE [DISTWIDTH] IN BLOOD BY AUTOMATED COUNT: 16.1 % (ref 11.6–15.1)
FRACTIONAL SHORTENING: 35 (ref 28–44)
GFR SERPL CREATININE-BSD FRML MDRD: 37 ML/MIN/1.73SQ M
GLUCOSE SERPL-MCNC: 152 MG/DL (ref 65–140)
GRAM STN SPEC: ABNORMAL
HCT VFR BLD AUTO: 34.3 % (ref 34.8–46.1)
HGB BLD-MCNC: 10.5 G/DL (ref 11.5–15.4)
HSV1 DNA CSF QL NAA+PROBE: NEGATIVE
HSV2 DNA CSF QL NAA+PROBE: NEGATIVE
IMM GRANULOCYTES # BLD AUTO: 0.19 THOUSAND/UL (ref 0–0.2)
IMM GRANULOCYTES NFR BLD AUTO: 2 % (ref 0–2)
INTERVENTRICULAR SEPTUM IN DIASTOLE (PARASTERNAL SHORT AXIS VIEW): 1.5 CM
INTERVENTRICULAR SEPTUM: 1.5 CM (ref 0.6–1.1)
LEFT ATRIUM SIZE: 4.1 CM
LEFT INTERNAL DIMENSION IN SYSTOLE: 2.8 CM (ref 2.1–4)
LEFT VENTRICULAR INTERNAL DIMENSION IN DIASTOLE: 4.3 CM (ref 3.5–6)
LEFT VENTRICULAR POSTERIOR WALL IN END DIASTOLE: 1.8 CM
LEFT VENTRICULAR STROKE VOLUME: 55 ML
LVSV (TEICH): 55 ML
LYMPHOCYTES # BLD AUTO: 1.38 THOUSANDS/ÂΜL (ref 0.6–4.47)
LYMPHOCYTES NFR BLD AUTO: 12 % (ref 14–44)
MAGNESIUM SERPL-MCNC: 2.3 MG/DL (ref 1.9–2.7)
MCH RBC QN AUTO: 30.7 PG (ref 26.8–34.3)
MCHC RBC AUTO-ENTMCNC: 30.6 G/DL (ref 31.4–37.4)
MCV RBC AUTO: 100 FL (ref 82–98)
MONOCYTES # BLD AUTO: 1.19 THOUSAND/ÂΜL (ref 0.17–1.22)
MONOCYTES NFR BLD AUTO: 10 % (ref 4–12)
NEUTROPHILS # BLD AUTO: 8.91 THOUSANDS/ÂΜL (ref 1.85–7.62)
NEUTS SEG NFR BLD AUTO: 74 % (ref 43–75)
NRBC BLD AUTO-RTO: 0 /100 WBCS
PHENYTOIN SERPL-MCNC: 10 UG/ML (ref 10–20)
PHENYTOIN SERPL-MCNC: 8.7 UG/ML (ref 10–20)
PHOSPHATE SERPL-MCNC: 2.3 MG/DL (ref 2.3–4.1)
PLATELET # BLD AUTO: 243 THOUSANDS/UL (ref 149–390)
PMV BLD AUTO: 9.7 FL (ref 8.9–12.7)
POTASSIUM SERPL-SCNC: 3.8 MMOL/L (ref 3.5–5.3)
PROT SERPL-MCNC: 5.5 G/DL (ref 6.4–8.4)
RBC # BLD AUTO: 3.42 MILLION/UL (ref 3.81–5.12)
SL CV LV EF: 60
SL CV PED ECHO LEFT VENTRICLE DIASTOLIC VOLUME (MOD BIPLANE) 2D: 85 ML
SL CV PED ECHO LEFT VENTRICLE SYSTOLIC VOLUME (MOD BIPLANE) 2D: 30 ML
SODIUM SERPL-SCNC: 138 MMOL/L (ref 135–147)
T3FREE SERPL-MCNC: 2.1 PG/ML (ref 2.5–3.9)
TR MAX PG: 29 MMHG
TR PEAK VELOCITY: 2.7 M/S
TRICUSPID VALVE PEAK REGURGITATION VELOCITY: 2.69 M/S
VALPROATE SERPL-MCNC: 52 UG/ML (ref 50–100)
WBC # BLD AUTO: 11.81 THOUSAND/UL (ref 4.31–10.16)

## 2024-11-15 PROCEDURE — 94640 AIRWAY INHALATION TREATMENT: CPT

## 2024-11-15 PROCEDURE — 84481 FREE ASSAY (FT-3): CPT | Performed by: PSYCHIATRY & NEUROLOGY

## 2024-11-15 PROCEDURE — 85730 THROMBOPLASTIN TIME PARTIAL: CPT | Performed by: PSYCHIATRY & NEUROLOGY

## 2024-11-15 PROCEDURE — 80164 ASSAY DIPROPYLACETIC ACD TOT: CPT

## 2024-11-15 PROCEDURE — 85025 COMPLETE CBC W/AUTO DIFF WBC: CPT

## 2024-11-15 PROCEDURE — NC001 PR NO CHARGE: Performed by: INTERNAL MEDICINE

## 2024-11-15 PROCEDURE — 80165 DIPROPYLACETIC ACID FREE: CPT

## 2024-11-15 PROCEDURE — 99233 SBSQ HOSP IP/OBS HIGH 50: CPT | Performed by: STUDENT IN AN ORGANIZED HEALTH CARE EDUCATION/TRAINING PROGRAM

## 2024-11-15 PROCEDURE — 84100 ASSAY OF PHOSPHORUS: CPT

## 2024-11-15 PROCEDURE — 83735 ASSAY OF MAGNESIUM: CPT

## 2024-11-15 PROCEDURE — 80185 ASSAY OF PHENYTOIN TOTAL: CPT | Performed by: PSYCHIATRY & NEUROLOGY

## 2024-11-15 PROCEDURE — 95720 EEG PHY/QHP EA INCR W/VEEG: CPT | Performed by: PSYCHIATRY & NEUROLOGY

## 2024-11-15 PROCEDURE — 80053 COMPREHEN METABOLIC PANEL: CPT

## 2024-11-15 PROCEDURE — 99233 SBSQ HOSP IP/OBS HIGH 50: CPT | Performed by: PSYCHIATRY & NEUROLOGY

## 2024-11-15 PROCEDURE — 99232 SBSQ HOSP IP/OBS MODERATE 35: CPT | Performed by: INTERNAL MEDICINE

## 2024-11-15 PROCEDURE — 88112 CYTOPATH CELL ENHANCE TECH: CPT | Performed by: STUDENT IN AN ORGANIZED HEALTH CARE EDUCATION/TRAINING PROGRAM

## 2024-11-15 PROCEDURE — 94760 N-INVAS EAR/PLS OXIMETRY 1: CPT

## 2024-11-15 PROCEDURE — 36620 INSERTION CATHETER ARTERY: CPT | Performed by: INTERNAL MEDICINE

## 2024-11-15 PROCEDURE — 99291 CRITICAL CARE FIRST HOUR: CPT | Performed by: PSYCHIATRY & NEUROLOGY

## 2024-11-15 PROCEDURE — 94003 VENT MGMT INPAT SUBQ DAY: CPT

## 2024-11-15 PROCEDURE — 94664 DEMO&/EVAL PT USE INHALER: CPT

## 2024-11-15 PROCEDURE — 95715 VEEG EA 12-26HR INTMT MNTR: CPT

## 2024-11-15 PROCEDURE — 80185 ASSAY OF PHENYTOIN TOTAL: CPT

## 2024-11-15 RX ADMIN — PHENYTOIN 100 MG: 125 SUSPENSION ORAL at 02:56

## 2024-11-15 RX ADMIN — MICONAZOLE NITRATE: 20 CREAM TOPICAL at 09:55

## 2024-11-15 RX ADMIN — LEVOTHYROXINE SODIUM 100 MCG: 100 TABLET ORAL at 05:45

## 2024-11-15 RX ADMIN — THIAMINE HYDROCHLORIDE 500 MG: 100 INJECTION, SOLUTION INTRAMUSCULAR; INTRAVENOUS at 21:02

## 2024-11-15 RX ADMIN — MIDAZOLAM 30 MG/HR: 5 INJECTION INTRAMUSCULAR; INTRAVENOUS at 04:47

## 2024-11-15 RX ADMIN — MIDAZOLAM 30 MG/HR: 5 INJECTION INTRAMUSCULAR; INTRAVENOUS at 12:09

## 2024-11-15 RX ADMIN — CHLORHEXIDINE GLUCONATE 0.12% ORAL RINSE 15 ML: 1.2 LIQUID ORAL at 22:33

## 2024-11-15 RX ADMIN — NOREPINEPHRINE BITARTRATE 18 MCG/MIN: 1 INJECTION, SOLUTION, CONCENTRATE INTRAVENOUS at 05:11

## 2024-11-15 RX ADMIN — LEVETIRACETAM 1000 MG: 100 INJECTION, SOLUTION INTRAVENOUS at 22:25

## 2024-11-15 RX ADMIN — NOREPINEPHRINE BITARTRATE 19 MCG/MIN: 1 INJECTION, SOLUTION, CONCENTRATE INTRAVENOUS at 16:31

## 2024-11-15 RX ADMIN — LEVETIRACETAM 1000 MG: 100 INJECTION, SOLUTION INTRAVENOUS at 10:17

## 2024-11-15 RX ADMIN — SENNOSIDES AND DOCUSATE SODIUM 2 TABLET: 50; 8.6 TABLET ORAL at 20:15

## 2024-11-15 RX ADMIN — LEVALBUTEROL HYDROCHLORIDE 1.25 MG: 1.25 SOLUTION RESPIRATORY (INHALATION) at 14:04

## 2024-11-15 RX ADMIN — ACETAMINOPHEN 1000 MG: 10 INJECTION INTRAVENOUS at 13:30

## 2024-11-15 RX ADMIN — PHENYTOIN 100 MG: 125 SUSPENSION ORAL at 10:40

## 2024-11-15 RX ADMIN — MICONAZOLE NITRATE: 20 CREAM TOPICAL at 17:00

## 2024-11-15 RX ADMIN — MIDAZOLAM 18 MG/HR: 5 INJECTION INTRAMUSCULAR; INTRAVENOUS at 16:46

## 2024-11-15 RX ADMIN — CHLORHEXIDINE GLUCONATE 0.12% ORAL RINSE 15 ML: 1.2 LIQUID ORAL at 12:56

## 2024-11-15 RX ADMIN — VASOPRESSIN 0.04 UNITS/MIN: 20 INJECTION INTRAVENOUS at 09:10

## 2024-11-15 RX ADMIN — ATORVASTATIN CALCIUM 10 MG: 10 TABLET, FILM COATED ORAL at 16:26

## 2024-11-15 RX ADMIN — VALPROATE SODIUM 250 MG: 100 INJECTION, SOLUTION INTRAVENOUS at 22:25

## 2024-11-15 RX ADMIN — PIPERACILLIN SODIUM AND TAZOBACTAM SODIUM 4.5 G: 36; 4.5 INJECTION, POWDER, LYOPHILIZED, FOR SOLUTION INTRAVENOUS at 09:28

## 2024-11-15 RX ADMIN — NOREPINEPHRINE BITARTRATE 18 MCG/MIN: 1 INJECTION, SOLUTION, CONCENTRATE INTRAVENOUS at 01:25

## 2024-11-15 RX ADMIN — Medication 20 MG: at 05:45

## 2024-11-15 RX ADMIN — LEVALBUTEROL HYDROCHLORIDE 1.25 MG: 1.25 SOLUTION RESPIRATORY (INHALATION) at 19:50

## 2024-11-15 RX ADMIN — SERTRALINE HYDROCHLORIDE 50 MG: 50 TABLET ORAL at 10:27

## 2024-11-15 RX ADMIN — POTASSIUM PHOSPHATE, MONOBASIC POTASSIUM PHOSPHATE, DIBASIC 30 MMOL: 224; 236 INJECTION, SOLUTION, CONCENTRATE INTRAVENOUS at 09:20

## 2024-11-15 RX ADMIN — LEVALBUTEROL HYDROCHLORIDE 1.25 MG: 1.25 SOLUTION RESPIRATORY (INHALATION) at 07:00

## 2024-11-15 RX ADMIN — HEPARIN SODIUM 10.1 UNITS/KG/HR: 10000 INJECTION, SOLUTION INTRAVENOUS at 09:23

## 2024-11-15 RX ADMIN — PIPERACILLIN SODIUM AND TAZOBACTAM SODIUM 4.5 G: 36; 4.5 INJECTION, POWDER, LYOPHILIZED, FOR SOLUTION INTRAVENOUS at 13:15

## 2024-11-15 RX ADMIN — NOREPINEPHRINE BITARTRATE 16 MCG/MIN: 1 INJECTION, SOLUTION, CONCENTRATE INTRAVENOUS at 12:56

## 2024-11-15 RX ADMIN — PIPERACILLIN SODIUM AND TAZOBACTAM SODIUM 4.5 G: 36; 4.5 INJECTION, POWDER, LYOPHILIZED, FOR SOLUTION INTRAVENOUS at 20:57

## 2024-11-15 RX ADMIN — VALPROATE SODIUM 250 MG: 100 INJECTION, SOLUTION INTRAVENOUS at 10:38

## 2024-11-15 RX ADMIN — MIDAZOLAM 30 MG/HR: 5 INJECTION INTRAMUSCULAR; INTRAVENOUS at 01:04

## 2024-11-15 RX ADMIN — VALPROATE SODIUM 250 MG: 100 INJECTION, SOLUTION INTRAVENOUS at 16:28

## 2024-11-15 RX ADMIN — MIDAZOLAM 30 MG/HR: 5 INJECTION INTRAMUSCULAR; INTRAVENOUS at 08:41

## 2024-11-15 RX ADMIN — SENNOSIDES AND DOCUSATE SODIUM 2 TABLET: 50; 8.6 TABLET ORAL at 10:27

## 2024-11-15 RX ADMIN — ACETAMINOPHEN 1000 MG: 10 INJECTION INTRAVENOUS at 01:29

## 2024-11-15 RX ADMIN — PHENYTOIN 100 MG: 125 SUSPENSION ORAL at 20:15

## 2024-11-15 RX ADMIN — VASOPRESSIN 0.04 UNITS/MIN: 20 INJECTION INTRAVENOUS at 16:33

## 2024-11-15 RX ADMIN — VALPROATE SODIUM 250 MG: 100 INJECTION, SOLUTION INTRAVENOUS at 03:37

## 2024-11-15 RX ADMIN — ACYCLOVIR SODIUM 775 MG: 50 INJECTION, SOLUTION INTRAVENOUS at 22:01

## 2024-11-15 RX ADMIN — ACYCLOVIR SODIUM 775 MG: 50 INJECTION, SOLUTION INTRAVENOUS at 09:36

## 2024-11-15 RX ADMIN — NOREPINEPHRINE BITARTRATE 15 MCG/MIN: 1 INJECTION, SOLUTION, CONCENTRATE INTRAVENOUS at 20:45

## 2024-11-15 NOTE — ASSESSMENT & PLAN NOTE
Lab Results   Component Value Date    EGFR 37 11/15/2024    EGFR 31 11/14/2024    EGFR 28 11/13/2024    CREATININE 1.46 (H) 11/15/2024    CREATININE 1.70 (H) 11/14/2024    CREATININE 1.84 (H) 11/13/2024   Baseline creatinine 1.7-1.9.  Creatinine currently at baseline.  Dose adjust antibiotic car wheels as needed for creatinine clearance.  Monitor creatinine daily

## 2024-11-15 NOTE — ASSESSMENT & PLAN NOTE
May be multifactorial related to possible infection, sedating medications.  Patient remains on vasopressor support.   -Hemodynamic support per critical care team   -Antimicrobials as above

## 2024-11-15 NOTE — ASSESSMENT & PLAN NOTE
UA with pyuria and urine culture growing E. coli and ESBL Proteus.  No symptoms of infection prior to admission.  Unlikely cause of status epilepticus however in absence of clear cause of seizure activity and while awaiting CT imaging will continue treatment   -Start Zosyn   -If no signs of pyelonephritis on imaging, treat x 3 days which would be sufficient for cystitis

## 2024-11-15 NOTE — RESPIRATORY THERAPY NOTE
Resp vent note   11/15/24 0248   Respiratory Assessment   Assessment Type Assess only   General Appearance Sedated   Respiratory Pattern Assisted   Chest Assessment Chest expansion symmetrical   Bilateral Breath Sounds Diminished   Cough None   Suction ET Tube   Resp Comments Pt cont on doc vent settings, tolerating well. Will cont to monitor pt per RCP.   O2 Device G5   Vent Information   Vent ID [id number]   Vent type Kathleen G5   Kathleen Vent Mode (S)CMV   $ Pulse Oximetry Spot Check Charge Completed   SpO2 96 %   (S)CMV Settings   Resp Rate (BPM) 22 BPM   VT (mL) 420 mL   FIO2 (%) 40 %   PEEP (cmH2O) 8 cmH2O   I:E Ratio 1/1.7   Insp Time (%) 1 %   Flow Trigger (LPM) 5   Humidification Heater   Heater Temperature (Set) 98.6 °F (37 °C)   (S)CMV Actuals   Resp Rate (BPM) 22 BPM   VT (mL) 468   MV 10.3   MAP (cmH2O) 14 cmH2O   Peak Pressure (cmH2O) 23 cmH2O   I:E Ratio (Obs) 1/1.7   Insp Resistance 15   Heater Temperature (Obs) 98.6 °F (37 °C)   Static Compliance (mL/cmH20) 43.3 mL/cmH2O   (S)CMV Alarms   High Peak Pressure (cmH2O) 40   Low Pressure (cmH2O) 5 cm H2O   High Resp Rate (BPM) 40 BPM   Low Resp Rate (BPM) 8 BPM   High MV (L/min) 20 L/min   Low MV (L/min) 4 L/min   High VT (mL) 1000 mL   Low VT (mL) 250 mL   Apnea Time (s) 20 S   Maintenance   Alarm (pink) cable attached No   Resuscitation bag with peep valve at bedside Yes   Water bag changed No   Circuit changed No   ETT  Cuffed 7.5 mm   Placement Date/Time: 11/12/24 1830   Preoxygenated: Yes  Technique: Video laryngoscopy  Type: Cuffed  Tube Size: 7.5 mm  Laryngoscope: GlideScope  Location: Oral  Placement Verification: Auscultation;Chest x-ray;End tidal CO2  Placed By: Physician  Pl...   Secured at (cm) 23   Measured from Lips   Secured Location Right   Secured by Commercial tube hernandez   Cuff Pressure (color) Green   HI-LO Suction  Intermittent suction   HI-LO Secretions Small;Tan   HI-LO Intervention Patent

## 2024-11-15 NOTE — PLAN OF CARE
Problem: INFECTION - ADULT  Goal: Absence or prevention of progression during hospitalization  Description: INTERVENTIONS:  - Assess and monitor for signs and symptoms of infection  - Monitor lab/diagnostic results  - Monitor all insertion sites, i.e. indwelling lines, tubes, and drains  - Monitor endotracheal if appropriate and nasal secretions for changes in amount and color  - [place] appropriate cooling/warming therapies per order  - Administer medications as ordered  - Instruct and encourage patient and family to use good hand hygiene technique  - Identify and instruct in appropriate isolation precautions for identified infection/condition  Outcome: Progressing     Problem: SAFETY ADULT  Goal: Patient will remain free of falls  Description: INTERVENTIONS:  - Educate patient/family on patient safety including physical limitations  - Instruct patient to call for assistance with activity   - Consult OT/PT to assist with strengthening/mobility   - Keep Call bell within reach  - Keep bed low and locked with side rails adjusted as appropriate  - Keep care items and personal belongings within reach  - Initiate and maintain comfort rounds  - Make Fall Risk Sign visible to staff  - Offer Toileting every 2 Hours, in advance of need  - Initiate/Maintain bed alarm  - Obtain necessary fall risk management equipment:   Problem: NEUROSENSORY - ADULT  Goal: Achieves stable or improved neurological status  Description: INTERVENTIONS  - Monitor and report changes in neurological status  - Monitor vital signs such as temperature, blood pressure, glucose, and any other labs ordered   - Initiate measures to prevent increased intracranial pressure  - Monitor for seizure activity and implement precautions if appropriate      Outcome: Progressing     - Apply yellow socks and bracelet for high fall risk patients  - Consider moving patient to room near nurses station  Outcome: Progressing

## 2024-11-15 NOTE — ASSESSMENT & PLAN NOTE
Lab Results   Component Value Date    EGFR 37 11/15/2024    EGFR 31 11/14/2024    EGFR 28 11/13/2024    CREATININE 1.46 (H) 11/15/2024    CREATININE 1.70 (H) 11/14/2024    CREATININE 1.84 (H) 11/13/2024   - at baseline   - Cr 1.46 today

## 2024-11-15 NOTE — ASSESSMENT & PLAN NOTE
Lab Results   Component Value Date    HGBA1C 5.7 (H) 11/13/2024   Diabetes well-controlled.  Glycemic management per primary team

## 2024-11-15 NOTE — PROGRESS NOTES
Progress Note - Infectious Disease   Name: Adelina Soto 64 y.o. female I MRN: [id number]  Unit/Bed#: ICU 05 I Date of Admission: 11/12/2024   Date of Service: 11/15/2024 I Hospital Day: 3    Assessment & Plan  Seizure (HCC)   Patient presented with acute encephalopathy and seizure-like activity.  Video EEG concerning for status epilepticus.  MRI brain without contrast was limited by motion but showed restricted diffusion in the left temporal lobe which could be postictal versus infection.  Status post LP 11/12 and CSF studies unremarkable, not consistent with infection with 2 WBCs.  Protein mildly elevated which could be due to seizure activity itself.  ME panel negative, stand alone HSV PCR also negative.  However with new onset seizures and possible abnormality of the temporal lobe HSV remains a possibility as PCR may be negative and CSF bland and early disease.  The patient has grown E. coli and ESBL Proteus in urine culture but in the absence of prior systemic signs of infection or localizing symptoms UTI, doubt this is causing status epilepticus. HIV negative.                -For now continue IV acyclovir 10 mg/kg every 12 hours              -Follow-up pending blood and CSF cultures              -MRI brain with contrast when able              -follow up CT C/A/P with contrast to rule out any infection or inflammatory process that may be contributing to seizure-like activity                     -Recommend repeat LP in next 48 to 72 hours to reassess CSF; if HSV remains negative and CSF bland then stop acyclovir    -AEDs per neurology  Bacteriuria  UA with pyuria and urine culture growing E. coli and ESBL Proteus.  No symptoms of infection prior to admission.  Unlikely cause of status epilepticus however in absence of clear cause of seizure activity and while awaiting CT imaging will continue treatment   -Start Zosyn   -If no signs of pyelonephritis on imaging, treat x 3 days which would be sufficient for  cystitis  Shock (HCC)  May be multifactorial related to possible infection, sedating medications.  Patient remains on vasopressor support.   -Hemodynamic support per critical care team   -Antimicrobials as above  Acute hypoxic respiratory failure (HCC)  Patient intubated for airway protection.  Status post bronchoscopy 11/13 for mucous plugging and thick left-sided secretions seen.  Chest x-ray not consistent with pneumonia.  BAL culture shows growth of mixed respiratory viktoriya.   -Ventilator management per critical care team  Morbid obesity with BMI of 50.0-59.9, adult ([facility])  Affects antimicrobial dosing  CKD (chronic kidney disease)  Lab Results   Component Value Date    EGFR 37 11/15/2024    EGFR 31 11/14/2024    EGFR 28 11/13/2024    CREATININE 1.46 (H) 11/15/2024    CREATININE 1.70 (H) 11/14/2024    CREATININE 1.84 (H) 11/13/2024   Baseline creatinine 1.7-1.9.  Creatinine currently at baseline.  Dose adjust antibiotic car wheels as needed for creatinine clearance.  Monitor creatinine daily  Type 2 diabetes mellitus with hyperglycemia, unspecified whether long term insulin use ([facility])  Lab Results   Component Value Date    HGBA1C 5.7 (H) 11/13/2024   Diabetes well-controlled.  Glycemic management per primary team    Above management plan to continue IV acyclovir, start Zosyn discussed with the critical care resident.  Discussed with the patient's partner at bedside.  ID will follow.    Antibiotics:  Acyclovir day 3  Zosyn day 1    Subjective   The patient remains intubated, sedated on video EEG.  Fevers present today.  Remains on vasopressor support.    Temp:  [99.7 °F (37.6 °C)-101.5 °F (38.6 °C)] 101.1 °F (38.4 °C)  HR:  [] 100  BP: ()/(50-65) 109/59  Resp:  [21-22] 22  SpO2:  [94 %-99 %] 99 %  O2 Device: Ventilator    General: Intubated, sedated  Head: EEG leads in place  Mouth: ET tube in place  Neck: trachea midline   CV: RRR, no murmurs   Lungs: clear to auscultation bilaterally   Abdomen: no  distension   Skin: no rashes, cellulitis.  Intertrigo in her skin folds  Neuro: Intubated, sedated    Lab Results: I have reviewed the following results:  Results from last 7 days   Lab Units 11/15/24  0524 11/14/24  0428 11/13/24  2113   WBC Thousand/uL 11.81* 11.37* 15.23*   HEMOGLOBIN g/dL 10.5* 10.5* 11.5   PLATELETS Thousands/uL 243 272 328     Results from last 7 days   Lab Units 11/15/24  0524 11/14/24  0428 11/13/24  2113 11/13/24  1827 11/13/24  0431   SODIUM mmol/L 138 139 141   < > 140   POTASSIUM mmol/L 3.8 3.5 3.8   < > 3.5   CHLORIDE mmol/L 108 104 107   < > 103   CO2 mmol/L 22 21 23   < > 25   BUN mg/dL 14 19 22   < > 27*   CREATININE mg/dL 1.46* 1.70* 1.84*   < > 1.82*   EGFR ml/min/1.73sq m 37 31 28   < > 28   CALCIUM mg/dL 7.4* 8.0* 8.4   < > 8.7   AST U/L 16 21  --   --  28   ALT U/L 6* 7  --   --  10   ALK PHOS U/L 95 99  --   --  102   ALBUMIN g/dL 2.5* 3.0*  --   --  2.9*    < > = values in this interval not displayed.     Results from last 7 days   Lab Units 11/12/24 2353 11/12/24 2238 11/12/24 2228 11/12/24 2039   BLOOD CULTURE   --  No Growth at 48 hrs. No Growth at 48 hrs.  --    GRAM STAIN RESULT  1+ Gram negative rods*  1+ Gram positive cocci in clusters*  1+ Polys*  --   --   --    URINE CULTURE   --   --   --  >100,000 cfu/ml Escherichia coli*  >100,000 cfu/ml Proteus mirabilis ESBL*     Results from last 7 days   Lab Units 11/12/24 2132   PROCALCITONIN ng/ml 0.36*       I have personally reviewed pertinent imaging reports and images in PACS. MRI brain without contrast shows left temporal lobe restricted diffusion.

## 2024-11-15 NOTE — ASSESSMENT & PLAN NOTE
"Lab Results   Component Value Date    HGBA1C 5.7 (H) 11/13/2024       No results for input(s): \"POCGLU\" in the last 72 hours.    Blood Sugar Average: Last 72 hrs:    - A1c 5.7%   - management per primary team     "

## 2024-11-15 NOTE — ASSESSMENT & PLAN NOTE
"Adelina Soto is a 64 year old woman with PMHx including HTN, HLD, hypothyroidism, A fib on Warfarin, DM, GERD, anxiety who presented to the hospital for evaluation of altered mental status. Stroke alert called due to gaze deviation and altered mentation. Pt was noted to have two witnessed seizures prior to arrival, and received 6 mg Versed prior to arrival. Continued to have left gaze preference for ED, with lack of command following although was moving her LUE spontaneously. CT head and CTA H/N with no findings that could explain her presentation. After return from CT scan, appeared to have some improvement in her exam but still off from her baseline on discussion with sister at bedside. Concern for status epilepticus with multiple seizures without return to baseline as of yet. Otherwise compliant with Coumadin, with INR 2.42.     VEEG monitoring report of 08:00 11/13: 2:20 this morning and around 02:40 started to have repetitive left frontal seizures, every 10 seconds at times. She was started on sedation again shortly after 03:00 and seizures stopped at that point. EEG is now very low amplitude and slow, with frequent left temporal sharps\"    MRI: Limited by motion. Mild restricted diffusion with abnormal FLAIR signal in the mesial left temporal lobe could be postictal. Recommend correlation with CSF to exclude infection such as HSV. Abnormal signal in the right thalamus of uncertain etiology.     Impression: status epilepticus, which required multiple AED & sedation medications to control in setting of no history of seizures & currently unknown etiology, but concern for UTI vs HSV vs meningitis warranting ongoing workup.    TPA Decision: Patient not a candidate. Unclear time of onset outside appropriate time window. and Bleeding risk. Not an endovascular candidate since no LVO/IR target identified.      Plan:   - Rec trough total levels 30-60 min prior to next ASM dosing  - Free valp trough level 11/15 " follow  - Await final CSF results immuno vs HSV  - Seizure precautions  - Telemetry rec  - Will need DMV report  - Urine cx ESBL MDRO, zosyn begun  - Goal of normothermia and euglycemia  - Therapy when able  - Continue home Warfarin  - Systolic BP goal less than 180  - Continue to monitor closely for changes in examination, notify Neurology if so  - Rest of care per primary  - Seizure precautions outpatient: no driving, no operating heavy machinery, no swimming alone, no climbing, no baths only showers, no cooking alone, or any activity that would put them at increased risk of harm if they were to have a seizure.

## 2024-11-15 NOTE — PROGRESS NOTES
Progress Note - Cardiology   Name: Adelina Soto 64 y.o. 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   - MRI with mild restricted diffusion with abnormal FLAIR in the temporal lobe which could be post ictal vs HSV encephalitis   - differential includes UTI with ESB, MDRO on zosyn as well as menegitis and encephalitis as above   - final CSF results pending   - Remains on multiple AEDs and sedation per neurology and primary team     Shock (HCC)  - likely secondary to sedation and sepsis   - treatment for Infection as above   - titrate pressors as tolerated     Abnormal EKG  - overnight on 11/13/2024 EKG demonstrated possible ST elevations  - patient was sedated at the time therefore could not be evaluated for chest pain.   - EKG changes appeared to be to baseline wander and not true ST elevations   - Echo with preserved EF 60%, even with definity wall motion is difficult to assess    Elevated troponin level not due myocardial infarction  - likely secondary to sepsis and status epilepticus   - Echo demonstrated normal EF, wall motion difficult to discern even with definity     UTI (urinary tract infection)  - Urine culture with ESBL, MDRO   - continues on zosyn     Paroxysmal atrial fibrillation ([facility])  - history of PAF on coumadin at home   - rate control with Metoprolol at home, held currently   - was on amio drip earlier this admission, now being held      COPD with asthma ([facility])  - management per primary team   - not currently in exacerbation    Hypothyroid  - continue levothyroxine     GERD (gastroesophageal reflux disease)  - continue omeprazole     Anxiety  - home sertraline held, managed with sedation     CKD (chronic kidney disease)  Lab Results   Component Value Date    EGFR 37 11/15/2024    EGFR 31 11/14/2024    EGFR 28 11/13/2024     "CREATININE 1.46 (H) 11/15/2024    CREATININE 1.70 (H) 11/14/2024    CREATININE 1.84 (H) 11/13/2024   - at baseline   - Cr 1.46 today     Type 2 diabetes mellitus with hyperglycemia, unspecified whether long term insulin use (HCC)  Lab Results   Component Value Date    HGBA1C 5.7 (H) 11/13/2024       No results for input(s): \"POCGLU\" in the last 72 hours.    Blood Sugar Average: Last 72 hrs:    - A1c 5.7%   - management per primary team     Plan:   - EF preserved on echo and wall motion difficult to assess   - EKG changes are likely not due to ACS   - continue heparin for A fib   - wean pressors as tolerated   - cardiology will sign off at this time, please reach out for any further questions or concerns    Subjective     No overnight events. Patient was evaluated at the bedside this morning and remains intubated and sedated.     Objective :  Temp:  [99.7 °F (37.6 °C)-101.5 °F (38.6 °C)] 101.1 °F (38.4 °C)  HR:  [] 104  BP: ()/(50-65) 85/58  Resp:  [21-22] 22  SpO2:  [94 %-99 %] 98 %  O2 Device: Ventilator  Orthostatic Blood Pressures      Flowsheet Row Most Recent Value   Blood Pressure 85/58 filed at 11/15/2024 1446          First Weight: Weight - Scale: 111 kg (245 lb) (11/13/24 0930)  Vitals:    11/14/24 0543 11/14/24 1609   Weight: 110 kg (242 lb 8.1 oz) 110 kg (242 lb)     Physical Exam  General Appearance:    Intubated and sedated    Head:    Normocephalic, without obvious abnormality, atraumatic   Eyes:    PERRL, conjunctiva/corneas clear     Neck:   Supple, symmetrical, trachea midline, no JVD. Right IJ CVC in place    Lungs:     Clear to auscultation bilaterally, respirations unlabored   Chest wall:    No tenderness or deformity.    Heart:   Regular rate and rhythm. S1 and S2 normal, no murmur, rub or gallop   Abdomen:     Soft, non-tender, bowel sounds active all four quadrants   Extremities:   Extremities normal, atraumatic, no cyanosis or edema   Pulses:   2+ and symmetric all extremities " "  Neurologic:  Intubated and sedated         Lab Results: I have reviewed the following results:CBC/BMP:   .     11/15/24  0524   WBC 11.81*   HGB 10.5*   HCT 34.3*      SODIUM 138   K 3.8      CO2 22   BUN 14   CREATININE 1.46*   GLUC 152*   MG 2.3   PHOS 2.3    , Creatinine Clearance: Estimated Creatinine Clearance: 46.6 mL/min (A) (by C-G formula based on SCr of 1.46 mg/dL (H))., LFTs:   .     11/15/24  0524   AST 16   ALT 6*   ALB 2.5*   TBILI 0.60   ALKPHOS 95    , PTT/INR:  .     11/15/24  0524   PTT 71*    , Troponin,BNP:No new results in last 24 hours. , Lactic Acid: No new results in last 24 hours. , Procalcitonin: No results found for: \"PROCALCITONI\"  Results from last 7 days   Lab Units 11/15/24  0524 11/14/24  0428 11/13/24  2113   WBC Thousand/uL 11.81* 11.37* 15.23*   HEMOGLOBIN g/dL 10.5* 10.5* 11.5   HEMATOCRIT % 34.3* 34.1* 38.0   PLATELETS Thousands/uL 243 272 328     Results from last 7 days   Lab Units 11/15/24  0524 11/14/24  0428 11/13/24  2113   POTASSIUM mmol/L 3.8 3.5 3.8   CHLORIDE mmol/L 108 104 107   CO2 mmol/L 22 21 23   BUN mg/dL 14 19 22   CREATININE mg/dL 1.46* 1.70* 1.84*   CALCIUM mg/dL 7.4* 8.0* 8.4     Results from last 7 days   Lab Units 11/15/24  0524 11/14/24  1804 11/14/24  1051 11/14/24  0428 11/13/24  1745 11/13/24  1003 11/13/24  0431   INR   --   --   --  1.44*  --  1.41* 1.59*   PTT seconds 71* 81* 85*  --    < > 31  --     < > = values in this interval not displayed.     Lab Results   Component Value Date    HGBA1C 5.7 (H) 11/13/2024     Lab Results   Component Value Date    CKTOTAL 732 (H) 11/14/2024    TROPONINI <0.03 08/07/2021     "

## 2024-11-15 NOTE — ASSESSMENT & PLAN NOTE
- history of PAF on coumadin at home   - rate control with Metoprolol at home, held currently   - was on amio drip earlier this admission, now being held

## 2024-11-15 NOTE — ASSESSMENT & PLAN NOTE
Patient intubated for airway protection.  Status post bronchoscopy 11/13 for mucous plugging and thick left-sided secretions seen.  Chest x-ray not consistent with pneumonia.  BAL culture shows growth of mixed respiratory viktoriya.   -Ventilator management per critical care team

## 2024-11-15 NOTE — PROGRESS NOTES
"=Progress Note - Neurology   Name: Adelina Soto 64 y.o. female I MRN: [id number]  Unit/Bed#: ICU 05 I Date of Admission: 11/12/2024   Date of Service: 11/15/2024 I Hospital Day: 3    Assessment & Plan  Seizure (HCC)  Adelina Soto is a 64 year old woman with PMHx including HTN, HLD, hypothyroidism, A fib on Warfarin, DM, GERD, anxiety who presented to the hospital for evaluation of altered mental status. Stroke alert called due to gaze deviation and altered mentation. Pt was noted to have two witnessed seizures prior to arrival, and received 6 mg Versed prior to arrival. Continued to have left gaze preference for ED, with lack of command following although was moving her LUE spontaneously. CT head and CTA H/N with no findings that could explain her presentation. After return from CT scan, appeared to have some improvement in her exam but still off from her baseline on discussion with sister at bedside. Concern for status epilepticus with multiple seizures without return to baseline as of yet. Otherwise compliant with Coumadin, with INR 2.42.     VEEG monitoring report of 08:00 11/13: 2:20 this morning and around 02:40 started to have repetitive left frontal seizures, every 10 seconds at times. She was started on sedation again shortly after 03:00 and seizures stopped at that point. EEG is now very low amplitude and slow, with frequent left temporal sharps\"    MRI: Limited by motion. Mild restricted diffusion with abnormal FLAIR signal in the mesial left temporal lobe could be postictal. Recommend correlation with CSF to exclude infection such as HSV. Abnormal signal in the right thalamus of uncertain etiology.     Impression: status epilepticus, which required multiple AED & sedation medications to control in setting of no history of seizures & currently unknown etiology, but concern for UTI vs HSV vs meningitis warranting ongoing workup.    TPA Decision: Patient not a candidate. Unclear time of onset " outside appropriate time window. and Bleeding risk. Not an endovascular candidate since no LVO/IR target identified.      Plan:   - Rec trough total levels 30-60 min prior to next ASM dosing  - Free valp trough level 11/15 follow  - Await final CSF results immuno vs HSV  - Seizure precautions  - Telemetry rec  - Will need DMV report  - Urine cx ESBL MDRO, zosyn begun  - Goal of normothermia and euglycemia  - Therapy when able  - Continue home Warfarin  - Systolic BP goal less than 180  - Continue to monitor closely for changes in examination, notify Neurology if so  - Rest of care per primary  - Seizure precautions outpatient: no driving, no operating heavy machinery, no swimming alone, no climbing, no baths only showers, no cooking alone, or any activity that would put them at increased risk of harm if they were to have a seizure.    Recommendations for outpatient neurological follow up have yet to be determined.  I have discussed with Dr. Bazan the above plan to treat pt. He agrees with the plan.  Please contact the [employer] role for the Neurology service with any questions/concerns.    Subjective   Intubated    Review of Systems   Unable to perform ROS: Intubated       Objective :  Temp:  [99.3 °F (37.4 °C)-101.3 °F (38.5 °C)] 101.3 °F (38.5 °C)  HR:  [] 98  BP: (112)/(56) 112/56  Resp:  [12-22] 21  SpO2:  [93 %-96 %] 96 %  O2 Device: Ventilator    Physical Exam  Constitutional:       General: She is not in acute distress.     Appearance: She is not diaphoretic.   HENT:      Head: Normocephalic.      Right Ear: External ear normal.      Left Ear: External ear normal.      Nose: Nose normal.   Eyes:      General: No scleral icterus.        Right eye: No discharge.         Left eye: No discharge.      Conjunctiva/sclera: Conjunctivae normal.     Neurological Exam  Mental Status   Patient is nonverbal.  Unresponsive while under sedation.    Cranial Nerves  Intubated.    Motor  Normal muscle bulk  throughout. No fasciculations present. Normal muscle tone. No abnormal involuntary movements.  No movement to pain .    Sensory  Intubated.    Reflexes  Deep tendon reflexes are 2+ and symmetric except as noted.    Coordination    Intubated.    Gait    Intubated.        Lab Results: I have reviewed the following results:  Other Study Results Review: Other studies reviewed include: ASM levels    VTE Pharmacologic Prophylaxis: VTE covered by:  heparin (porcine), Intravenous, 10.1 Units/kg/hr at 11/15/24 0923       Administrative Statements   I have spent a total time of 40 minutes in caring for this patient on the day of the visit/encounter including Risks and benefits of tx options, Instructions for management, Counseling / Coordination of care, Documenting in the medical record, Reviewing / ordering tests, medicine, procedures  , Obtaining or reviewing history  , and Communicating with other healthcare professionals .

## 2024-11-15 NOTE — PROGRESS NOTES
Progress Note - Critical Care/ICU   Name: Adelina Soto 64 y.o. female I MRN: [id number]  Unit/Bed#: ICU 05 I Date of Admission: 11/12/2024   Date of Service: 11/15/2024 I Hospital Day: 3       Assessment & Plan   Neuro:   Diagnosis: Status epilepticus/Abnormal MRI  11/12 MRI-Mild restricted diffusion with abnormal FLAIR signal in the mesial left temporal lobe could be postictal. Recommend correlation with CSF to exclude infection such as HSV.   Abnormal signal in the right thalamus of uncertain etiology.   11/13 vEEG L frontal seizures  CSF MEP neg, Protein 74  Cx/HSV/Paraneoplasic/West Nile pending  Plan:   Neuro/ID following  Cont antibx as below  Seizure precautions  Cont Keppra/Depacon/Dilantin--levels for this am  Versed gtt 30mg/hr---consider decreasing dose if no seizures    Diagnosis: Depression  Plan: Home zoloft    CV:   Diagnosis: Shock 2/2 sedation/burst suppression/Elevated trop/Hx HTN/HLD  Trop peak 1239  Plan:   Echo pending   Cont levo/vaso for MAP >65  Maintain art line/tlc  Holding home norvasc/lasix/bb  Home statin    Diagnosis: VTach/Afib on coumadin  Plan:  Amio gtt  Heparin gtt eventual transition back to coumadin  Pulm:  Diagnosis: acute hypoxic respiratory failure  11/14 CXR pulm vasc congestion  S/p bronch 11/12 1+GNR/1+GPC in clusters  Plan:   AC/VC 22/420/8/40 wean for sats >92%  No SAT/SBT with burst supression  IV gtts double concentrated. Consider IV diuretics PRN    GI:   Diagnosis: GERD  Plan: home PPI    :   Diagnosis: CKD  Baseline creat 1.4-2  Plan: bmp in am, peace with sedation    F/E/N:   No active issues    Heme/Onc:   Diagnosis: Anemia  Baseline prior 7-8 now 10  Plan: cbc in am    Endo:   Diagnosis: Hypothyroid  TSH 0.295 Ft4 1.23  Plan:   Consider decreasing synthroid to 75mcg with repeat TSH reflex in 4-6wks    ID:   Diagnosis: SIRS  11/12 BC NG  11/12 CSF as above  11/12 UC Ecoli/Proteus ESBL  11/12 Bronch 1+GNR/1+GPC in clusters  Plan:   ID following  Acyclovir D3  pending csf studies above given abnormal MRI  Ceftx D3 consider d/c per ID  Consider treating UTI for proteus esbl--follow up with ID  Check CTCAP    MSK/Skin:   Diagnosis: wounds  Plan: wound care cx    Disposition: Critical care    ICU Core Measures     Vented Patient  VAP Bundle  VAP bundle ordered     A: Assess, Prevent, and Manage Pain Has pain been assessed? Yes  Need for changes to pain regimen? No   B: Both Spontaneous Awakening Trials (SATs) and Spontaneous Breathing Trials (SBTs) Plan to perform spontaneous awakening trial today? No secondary to ongoing seizures/status epilepticus  Plan to perform spontaneous breathing trial today? No secondary to ongoing seizures/status epilepticus  Obvious barriers to extubation? Yes   C: Choice of Sedation RASS Goal: -5 Unarousable or 0 Alert and Calm  Need for changes to sedation or analgesia regimen? Yes   D: Delirium CAM-ICU: Unable to perform secondary to Acute cognitive dysfunction   E: Early Mobility  Plan for early mobility? No   F: Family Engagement Plan for family engagement today? Yes       Antibiotic Review: Infectious disease consulted    Review of Invasive Devices:    Stringer Plan: Continue for accurate I/O monitoring for 48 hours  Central access plan: Medications requiring central line  Stafford Plan: Keep arterial line for hemodynamic monitoring    Prophylaxis:  VTE VTE covered by:  heparin (porcine), Intravenous, 10.1 Units/kg/hr at 11/14/24 1843       Stress Ulcer  covered byfamotidine (PEPCID) 20 mg tablet [301660428] (Long-Term Med), pantoprazole (PROTONIX) 40 mg tablet [[id number]] (Long-Term Med), pantoprazole (PROTONIX) injection 40 mg [[id number]]         24 Hour Events : Prop gtt d/c with high triglyerides, VT.   Subjective   Review of Systems: Review of Systems   Unable to perform ROS: Intubated       Objective :                   Vitals I/O      Most Recent Min/Max in 24hrs   Temp 100 °F (37.8 °C) Temp  Min: 99 °F (37.2 °C)  Max: 100 °F (37.8 °C)    Pulse (!) 106 Pulse  Min: 90  Max: 116   Resp 22 Resp  Min: 12  Max: 22   /56 BP  Min: 112/56  Max: 112/56   O2 Sat 95 % SpO2  Min: 91 %  Max: 97 %      Intake/Output Summary (Last 24 hours) at 11/15/2024 0011  Last data filed at 11/15/2024 0000  Gross per 24 hour   Intake 6725.67 ml   Output 1490 ml   Net 5235.67 ml       Diet Enteral/Parenteral; Tube Feeding No Oral Diet; Jevity 1.2 Enrrique; Continuous; 20    Invasive Monitoring   Arterial Line  Sravani /54  Arterial Line BP  Min: 84/48  Max: 130/58   MAP 72 mmHg  Arterial Line MAP (mmHg)  Min: 62 mmHg  Max: 84 mmHg           Physical Exam   Physical Exam  Eyes:      Pupils: Pupils are equal, round, and reactive to light.   Skin:     General: Skin is warm and dry.   HENT:      Head: Normocephalic and atraumatic.      Mouth/Throat:      Mouth: Mucous membranes are moist.   Cardiovascular:      Rate and Rhythm: Regular rhythm. Tachycardia present.      Pulses: Normal pulses.      Heart sounds: Normal heart sounds.   Musculoskeletal:         General: No swelling.      Right lower leg: No edema.      Left lower leg: No edema.   Abdominal: General: Bowel sounds are normal. There is no distension.      Palpations: Abdomen is soft.   Constitutional:       General: She is not in acute distress.     Interventions: She is sedated, intubated and restrained.   Pulmonary:      Effort: Pulmonary effort is normal. No respiratory distress. She is intubated.      Breath sounds: Normal breath sounds.   Neurological:      GCS: GCS eye subscore is 1. GCS verbal subscore is 1. GCS motor subscore is 1.          Diagnostic Studies        Lab Results: I have reviewed the following results:     Medications:  Scheduled PRN   acyclovir, 10 mg/kg (Adjusted), Q12H  atorvastatin, 10 mg, Daily With Dinner  cefTRIAXone, 1,000 mg, Q24H  chlorhexidine, 15 mL, Q12H LINO  ipratropium, 0.5 mg, TID  levalbuterol, 1.25 mg, TID  levETIRAcetam, 1,000 mg, Q12H LINO  levothyroxine, 100 mcg, Early  Morning  midazolam, 2 mg, Once  EARLINE ANTIFUNGAL, , BID  pantoprazole, 40 mg, Q24H LINO  phenytoin, 100 mg, Q8H LINO  senna-docusate sodium, 2 tablet, BID  sertraline, 50 mg, Daily  [START ON 11/17/2024] thiamine, 200 mg, Daily  thiamine, 500 mg, Daily  [START ON 11/20/2024] thiamine, 100 mg, Daily  valproate sodium, 250 mg, Q6H LINO      acetaminophen, 1,000 mg, Q6H PRN  albuterol, 2.5 mg, Q6H PRN  fentaNYL, 50 mcg, Q1H PRN  LORazepam, 2 mg, Q4H PRN  ondansetron, 4 mg, Q4H PRN       Continuous    amiodarone (CORDARONE) 900 mg in dextrose 5 % 500 mL infusion, 1 mg/min, Last Rate: 1 mg/min (11/14/24 2238)  heparin (porcine), 3-20 Units/kg/hr (Order-Specific), Last Rate: 10.1 Units/kg/hr (11/14/24 1843)  midazolam, 30 mg/hr, Last Rate: 30 mg/hr (11/14/24 2129)  norepinephrine, 1-30 mcg/min, Last Rate: 18 mcg/min (11/14/24 2134)  vasopressin, 0.04 Units/min, Last Rate: 0.04 Units/min (11/14/24 2356)         Labs:   CBC    Recent Labs     11/13/24 2113 11/14/24 0428   WBC 15.23* 11.37*   HGB 11.5 10.5*   HCT 38.0 34.1*    272     BMP    Recent Labs     11/13/24 2113 11/14/24 0428   SODIUM 141 139   K 3.8 3.5    104   CO2 23 21   AGAP 11 14*   BUN 22 19   CREATININE 1.84* 1.70*   CALCIUM 8.4 8.0*       Coags    Recent Labs     11/13/24  1003 11/13/24  1745 11/14/24  0428 11/14/24  1051 11/14/24  1804   INR 1.41*  --  1.44*  --   --    PTT 31   < >  --  85* 81*    < > = values in this interval not displayed.        Additional Electrolytes  Recent Labs     11/13/24  0431 11/13/24  1827 11/13/24 2113 11/14/24  0428   MG 1.8* 3.1* 3.6* 2.9*   PHOS 3.0 2.6 2.5  --    CAIONIZED 1.17  --  1.13  --           Blood Gas    Recent Labs     11/14/24 0427   PHART 7.374   NTU8RYV 39.5   PO2ART 88.1   [id number] 22.5   BEART -2.4   SOURCE Line, Arterial     Recent Labs     11/14/24 0427 11/14/24  0754   PHVEN  --  7.373   [id number]  --  41.4*   PO2VEN  --  42.1   [id number]  --  23.6*   BEVEN  --  -1.6   [id number]  --  75.3    SOURCE Line, Arterial  --     LFTs  Recent Labs     11/13/24  0431 11/14/24  0428   ALT 10 7   AST 28 21   ALKPHOS 102 99   ALB 2.9* 3.0*   TBILI 0.31 0.44       Infectious  No recent results  Glucose  Recent Labs     11/13/24  0431 11/13/24  1827 11/13/24  2113 11/14/24  0428   GLUC 112 127 133 161*

## 2024-11-15 NOTE — ASSESSMENT & PLAN NOTE
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

## 2024-11-15 NOTE — ASSESSMENT & PLAN NOTE
- likely secondary to sedation and sepsis   - treatment for Infection as above   - titrate pressors as tolerated

## 2024-11-15 NOTE — RESPIRATORY THERAPY NOTE
Resp care   11/15/24 0702   Respiratory Assessment   Assessment Type During-treatment   General Appearance Sedated   Respiratory Pattern Assisted   Chest Assessment Chest expansion symmetrical   Bilateral Breath Sounds Diminished;Coarse   Suction ET Tube   Resp Comments Received pt on cmv with settings per flow sheet. Will discusss weaning plan with team this am.   Vent Information   Vent ID [id number]   Vent type Kathleen G5   Kathleen Vent Mode (S)CMV   $ Pulse Oximetry Spot Check Charge Completed   (S)CMV Settings   Resp Rate (BPM) 22 BPM   VT (mL) 450 mL   FIO2 (%) 40 %   PEEP (cmH2O) 8 cmH2O   Insp Time (%) 0 %   Flow Trigger (LPM) 5   Humidification Heater   Heater Temperature (Set) 98.6 °F (37 °C)   (S)CMV Actuals   Resp Rate (BPM) 22 BPM   VT (mL) 448   MV 9.5   MAP (cmH2O) 14 cmH2O   Peak Pressure (cmH2O) 24 cmH2O   I:E Ratio (Obs) 1:1.7   Insp Resistance 15   Static Compliance (mL/cmH20) 36 mL/cmH2O   Plateau Pressure (cm H2O) 21 cm H2O   (S)CMV Alarms   High Peak Pressure (cmH2O) 40   Low Pressure (cmH2O) 5 cm H2O   High Resp Rate (BPM) 40 BPM   Low Resp Rate (BPM) 8 BPM   High MV (L/min) 20 L/min   Low MV (L/min) 4 L/min   High VT (mL) 1000 mL   Low VT (mL) 250 mL   Apnea Time (s) 20 S   Maintenance   Alarm (pink) cable attached No   Resuscitation bag with peep valve at bedside Yes   Water bag changed No   Circuit changed No   Daily Screen   Patient safety screen outcome: Failed   Not Ready for Weaning due to: Underline problem not resolved   IHI Ventilator Associated Pneumonia Bundle   Daily Awakening Trials Performed Not applicable (Comment)   ETT  Cuffed 7.5 mm   Placement Date/Time: 11/12/24 1830   Preoxygenated: Yes  Technique: Video laryngoscopy  Type: Cuffed  Tube Size: 7.5 mm  Laryngoscope: GlideScope  Location: Oral  Placement Verification: Auscultation;Chest x-ray;End tidal CO2  Placed By: Physician  Pl...   Secured at (cm) 23   Measured from Lips   Secured Location Center   Repositioned Right to  Center   Secured by Commercial tube [email or web address]   Site Condition Dry   Cuff Pressure (color) Green   HI-LO Suction  Intermittent suction   HI-LO Intervention Patent

## 2024-11-15 NOTE — ASSESSMENT & PLAN NOTE
- likely secondary to sepsis and status epilepticus   - Echo demonstrated normal EF, wall motion difficult to discern even with definity

## 2024-11-15 NOTE — ASSESSMENT & PLAN NOTE
Patient presented with acute encephalopathy and seizure-like activity.  Video EEG concerning for status epilepticus.  MRI brain without contrast was limited by motion but showed restricted diffusion in the left temporal lobe which could be postictal versus infection.  Status post LP 11/12 and CSF studies unremarkable, not consistent with infection with 2 WBCs.  Protein mildly elevated which could be due to seizure activity itself.  ME panel negative, stand alone HSV PCR also negative.  However with new onset seizures and possible abnormality of the temporal lobe HSV remains a possibility as PCR may be negative and CSF bland and early disease.  The patient has grown E. coli and ESBL Proteus in urine culture but in the absence of prior systemic signs of infection or localizing symptoms UTI, doubt this is causing status epilepticus. HIV negative.                -For now continue IV acyclovir 10 mg/kg every 12 hours              -Follow-up pending blood and CSF cultures              -MRI brain with contrast when able              -follow up CT C/A/P with contrast to rule out any infection or inflammatory process that may be contributing to seizure-like activity                     -Recommend repeat LP in next 48 to 72 hours to reassess CSF; if HSV remains negative and CSF bland then stop acyclovir    -AEDs per neurology

## 2024-11-15 NOTE — ASSESSMENT & PLAN NOTE
- overnight on 11/13/2024 EKG demonstrated possible ST elevations  - patient was sedated at the time therefore could not be evaluated for chest pain.   - EKG changes appeared to be to baseline wander and not true ST elevations   - Echo with preserved EF 60%, even with definity wall motion is difficult to assess

## 2024-11-16 ENCOUNTER — DOCUMENTATION (OUTPATIENT)
Dept: CCU | Facility: HOSPITAL | Age: 64
End: 2024-11-16

## 2024-11-16 ENCOUNTER — APPOINTMENT (INPATIENT)
Dept: RADIOLOGY | Facility: HOSPITAL | Age: 64
DRG: 097 | End: 2024-11-16
Payer: COMMERCIAL

## 2024-11-16 ENCOUNTER — APPOINTMENT (INPATIENT)
Dept: NEUROLOGY | Facility: CLINIC | Age: 64
DRG: 097 | End: 2024-11-16
Payer: COMMERCIAL

## 2024-11-16 LAB
ALBUMIN SERPL BCG-MCNC: 2.3 G/DL (ref 3.5–5)
ALP SERPL-CCNC: 92 U/L (ref 34–104)
ALT SERPL W P-5'-P-CCNC: 6 U/L (ref 7–52)
ANION GAP SERPL CALCULATED.3IONS-SCNC: 7 MMOL/L (ref 4–13)
APTT PPP: 76 SECONDS (ref 23–34)
AST SERPL W P-5'-P-CCNC: 17 U/L (ref 13–39)
BACTERIA CSF CULT: NO GROWTH
BASE EXCESS BLDA CALC-SCNC: -6.2 MMOL/L
BASOPHILS # BLD AUTO: 0.05 THOUSANDS/ÂΜL (ref 0–0.1)
BASOPHILS NFR BLD AUTO: 1 % (ref 0–1)
BILIRUB SERPL-MCNC: 0.55 MG/DL (ref 0.2–1)
BUN SERPL-MCNC: 15 MG/DL (ref 5–25)
CALCIUM ALBUM COR SERPL-MCNC: 8.7 MG/DL (ref 8.3–10.1)
CALCIUM SERPL-MCNC: 7.3 MG/DL (ref 8.4–10.2)
CHLORIDE SERPL-SCNC: 110 MMOL/L (ref 96–108)
CO2 SERPL-SCNC: 20 MMOL/L (ref 21–32)
CREAT SERPL-MCNC: 1.47 MG/DL (ref 0.6–1.3)
EOSINOPHIL # BLD AUTO: 0.13 THOUSAND/ÂΜL (ref 0–0.61)
EOSINOPHIL NFR BLD AUTO: 1 % (ref 0–6)
ERYTHROCYTE [DISTWIDTH] IN BLOOD BY AUTOMATED COUNT: 16.4 % (ref 11.6–15.1)
GFR SERPL CREATININE-BSD FRML MDRD: 37 ML/MIN/1.73SQ M
GLUCOSE SERPL-MCNC: 122 MG/DL (ref 65–140)
GLUCOSE SERPL-MCNC: 124 MG/DL (ref 65–140)
GLUCOSE SERPL-MCNC: 147 MG/DL (ref 65–140)
GLUCOSE SERPL-MCNC: 172 MG/DL (ref 65–140)
HCO3 BLDA-SCNC: 18.8 MMOL/L (ref 22–28)
HCT VFR BLD AUTO: 33.4 % (ref 34.8–46.1)
HGB BLD-MCNC: 10.2 G/DL (ref 11.5–15.4)
HOROWITZ INDEX BLDA+IHG-RTO: 50 MM[HG]
IMM GRANULOCYTES # BLD AUTO: 0.23 THOUSAND/UL (ref 0–0.2)
IMM GRANULOCYTES NFR BLD AUTO: 2 % (ref 0–2)
LYMPHOCYTES # BLD AUTO: 0.91 THOUSANDS/ÂΜL (ref 0.6–4.47)
LYMPHOCYTES NFR BLD AUTO: 8 % (ref 14–44)
MAGNESIUM SERPL-MCNC: 1.9 MG/DL (ref 1.9–2.7)
MCH RBC QN AUTO: 31.2 PG (ref 26.8–34.3)
MCHC RBC AUTO-ENTMCNC: 30.5 G/DL (ref 31.4–37.4)
MCV RBC AUTO: 102 FL (ref 82–98)
MONOCYTES # BLD AUTO: 0.87 THOUSAND/ÂΜL (ref 0.17–1.22)
MONOCYTES NFR BLD AUTO: 8 % (ref 4–12)
NEUTROPHILS # BLD AUTO: 8.64 THOUSANDS/ÂΜL (ref 1.85–7.62)
NEUTS SEG NFR BLD AUTO: 80 % (ref 43–75)
NRBC BLD AUTO-RTO: 0 /100 WBCS
O2 CT BLDA-SCNC: 15 ML/DL (ref 16–23)
OXYHGB MFR BLDA: 97.8 % (ref 94–97)
PCO2 BLDA: 35.3 MM HG (ref 36–44)
PEEP RESPIRATORY: 8 CM[H2O]
PH BLDA: 7.34 [PH] (ref 7.35–7.45)
PHENYTOIN SERPL-MCNC: 10.5 UG/ML (ref 10–20)
PHOSPHATE SERPL-MCNC: 3.3 MG/DL (ref 2.3–4.1)
PLATELET # BLD AUTO: 208 THOUSANDS/UL (ref 149–390)
PMV BLD AUTO: 10.1 FL (ref 8.9–12.7)
PO2 BLDA: 136.9 MM HG (ref 75–129)
POTASSIUM SERPL-SCNC: 3.8 MMOL/L (ref 3.5–5.3)
PROT SERPL-MCNC: 5.1 G/DL (ref 6.4–8.4)
RBC # BLD AUTO: 3.27 MILLION/UL (ref 3.81–5.12)
SODIUM SERPL-SCNC: 137 MMOL/L (ref 135–147)
SPECIMEN SOURCE: ABNORMAL
VENT AC: 16
VENT- AC: AC
VT SETTING VENT: 420 ML
WBC # BLD AUTO: 10.83 THOUSAND/UL (ref 4.31–10.16)

## 2024-11-16 PROCEDURE — 82948 REAGENT STRIP/BLOOD GLUCOSE: CPT

## 2024-11-16 PROCEDURE — 99233 SBSQ HOSP IP/OBS HIGH 50: CPT | Performed by: PSYCHIATRY & NEUROLOGY

## 2024-11-16 PROCEDURE — 94760 N-INVAS EAR/PLS OXIMETRY 1: CPT

## 2024-11-16 PROCEDURE — 83735 ASSAY OF MAGNESIUM: CPT

## 2024-11-16 PROCEDURE — 80185 ASSAY OF PHENYTOIN TOTAL: CPT

## 2024-11-16 PROCEDURE — 99291 CRITICAL CARE FIRST HOUR: CPT | Performed by: INTERNAL MEDICINE

## 2024-11-16 PROCEDURE — 95715 VEEG EA 12-26HR INTMT MNTR: CPT

## 2024-11-16 PROCEDURE — 85730 THROMBOPLASTIN TIME PARTIAL: CPT | Performed by: PSYCHIATRY & NEUROLOGY

## 2024-11-16 PROCEDURE — 94664 DEMO&/EVAL PT USE INHALER: CPT

## 2024-11-16 PROCEDURE — 94003 VENT MGMT INPAT SUBQ DAY: CPT

## 2024-11-16 PROCEDURE — 82805 BLOOD GASES W/O2 SATURATION: CPT | Performed by: STUDENT IN AN ORGANIZED HEALTH CARE EDUCATION/TRAINING PROGRAM

## 2024-11-16 PROCEDURE — 84100 ASSAY OF PHOSPHORUS: CPT

## 2024-11-16 PROCEDURE — 80053 COMPREHEN METABOLIC PANEL: CPT

## 2024-11-16 PROCEDURE — 71045 X-RAY EXAM CHEST 1 VIEW: CPT

## 2024-11-16 PROCEDURE — 94640 AIRWAY INHALATION TREATMENT: CPT

## 2024-11-16 PROCEDURE — 99233 SBSQ HOSP IP/OBS HIGH 50: CPT | Performed by: INTERNAL MEDICINE

## 2024-11-16 PROCEDURE — 85025 COMPLETE CBC W/AUTO DIFF WBC: CPT

## 2024-11-16 PROCEDURE — 95720 EEG PHY/QHP EA INCR W/VEEG: CPT | Performed by: PSYCHIATRY & NEUROLOGY

## 2024-11-16 RX ORDER — FUROSEMIDE 10 MG/ML
40 INJECTION INTRAMUSCULAR; INTRAVENOUS ONCE
Status: COMPLETED | OUTPATIENT
Start: 2024-11-16 | End: 2024-11-16

## 2024-11-16 RX ORDER — POTASSIUM CHLORIDE 20MEQ/15ML
40 LIQUID (ML) ORAL ONCE
Status: COMPLETED | OUTPATIENT
Start: 2024-11-16 | End: 2024-11-16

## 2024-11-16 RX ORDER — MAGNESIUM SULFATE HEPTAHYDRATE 40 MG/ML
2 INJECTION, SOLUTION INTRAVENOUS ONCE
Status: COMPLETED | OUTPATIENT
Start: 2024-11-16 | End: 2024-11-16

## 2024-11-16 RX ORDER — INSULIN LISPRO 100 [IU]/ML
2-12 INJECTION, SOLUTION INTRAVENOUS; SUBCUTANEOUS EVERY 6 HOURS SCHEDULED
Status: DISCONTINUED | OUTPATIENT
Start: 2024-11-16 | End: 2024-11-28

## 2024-11-16 RX ORDER — INSULIN LISPRO 100 [IU]/ML
2-12 INJECTION, SOLUTION INTRAVENOUS; SUBCUTANEOUS EVERY 6 HOURS SCHEDULED
Status: DISCONTINUED | OUTPATIENT
Start: 2024-11-16 | End: 2024-11-16

## 2024-11-16 RX ADMIN — NOREPINEPHRINE BITARTRATE 6 MCG/MIN: 1 INJECTION, SOLUTION, CONCENTRATE INTRAVENOUS at 15:43

## 2024-11-16 RX ADMIN — VASOPRESSIN 0.04 UNITS/MIN: 20 INJECTION INTRAVENOUS at 18:04

## 2024-11-16 RX ADMIN — NOREPINEPHRINE BITARTRATE 15 MCG/MIN: 1 INJECTION, SOLUTION, CONCENTRATE INTRAVENOUS at 01:00

## 2024-11-16 RX ADMIN — LEVALBUTEROL HYDROCHLORIDE 1.25 MG: 1.25 SOLUTION RESPIRATORY (INHALATION) at 19:23

## 2024-11-16 RX ADMIN — VALPROATE SODIUM 375 MG: 100 INJECTION, SOLUTION INTRAVENOUS at 16:17

## 2024-11-16 RX ADMIN — MICONAZOLE NITRATE: 20 CREAM TOPICAL at 17:23

## 2024-11-16 RX ADMIN — SERTRALINE HYDROCHLORIDE 50 MG: 50 TABLET ORAL at 08:57

## 2024-11-16 RX ADMIN — PIPERACILLIN SODIUM AND TAZOBACTAM SODIUM 4.5 G: 36; 4.5 INJECTION, POWDER, LYOPHILIZED, FOR SOLUTION INTRAVENOUS at 20:49

## 2024-11-16 RX ADMIN — LEVETIRACETAM 1000 MG: 100 INJECTION, SOLUTION INTRAVENOUS at 21:06

## 2024-11-16 RX ADMIN — FUROSEMIDE 40 MG: 10 INJECTION, SOLUTION INTRAVENOUS at 09:41

## 2024-11-16 RX ADMIN — VASOPRESSIN 0.04 UNITS/MIN: 20 INJECTION INTRAVENOUS at 09:46

## 2024-11-16 RX ADMIN — VASOPRESSIN 0.04 UNITS/MIN: 20 INJECTION INTRAVENOUS at 00:59

## 2024-11-16 RX ADMIN — PIPERACILLIN SODIUM AND TAZOBACTAM SODIUM 4.5 G: 36; 4.5 INJECTION, POWDER, LYOPHILIZED, FOR SOLUTION INTRAVENOUS at 11:59

## 2024-11-16 RX ADMIN — POTASSIUM CHLORIDE 40 MEQ: 20 SOLUTION ORAL at 06:28

## 2024-11-16 RX ADMIN — MAGNESIUM SULFATE HEPTAHYDRATE 2 G: 40 INJECTION, SOLUTION INTRAVENOUS at 06:28

## 2024-11-16 RX ADMIN — LEVETIRACETAM 1000 MG: 100 INJECTION, SOLUTION INTRAVENOUS at 08:55

## 2024-11-16 RX ADMIN — LEVOTHYROXINE SODIUM 100 MCG: 100 TABLET ORAL at 05:17

## 2024-11-16 RX ADMIN — Medication 20 MG: at 05:17

## 2024-11-16 RX ADMIN — VALPROATE SODIUM 250 MG: 100 INJECTION, SOLUTION INTRAVENOUS at 04:28

## 2024-11-16 RX ADMIN — HEPARIN SODIUM 10.1 UNITS/KG/HR: 10000 INJECTION, SOLUTION INTRAVENOUS at 11:57

## 2024-11-16 RX ADMIN — ONDANSETRON 4 MG: 2 INJECTION INTRAMUSCULAR; INTRAVENOUS at 04:58

## 2024-11-16 RX ADMIN — PHENYTOIN 100 MG: 125 SUSPENSION ORAL at 10:36

## 2024-11-16 RX ADMIN — MICONAZOLE NITRATE: 20 CREAM TOPICAL at 10:35

## 2024-11-16 RX ADMIN — LEVALBUTEROL HYDROCHLORIDE 1.25 MG: 1.25 SOLUTION RESPIRATORY (INHALATION) at 13:56

## 2024-11-16 RX ADMIN — VALPROATE SODIUM 500 MG: 100 INJECTION, SOLUTION INTRAVENOUS at 10:41

## 2024-11-16 RX ADMIN — VALPROATE SODIUM 375 MG: 100 INJECTION, SOLUTION INTRAVENOUS at 11:57

## 2024-11-16 RX ADMIN — PHENYTOIN 100 MG: 125 SUSPENSION ORAL at 03:08

## 2024-11-16 RX ADMIN — SENNOSIDES AND DOCUSATE SODIUM 2 TABLET: 50; 8.6 TABLET ORAL at 08:55

## 2024-11-16 RX ADMIN — ACYCLOVIR SODIUM 775 MG: 50 INJECTION, SOLUTION INTRAVENOUS at 09:48

## 2024-11-16 RX ADMIN — ACYCLOVIR SODIUM 775 MG: 50 INJECTION, SOLUTION INTRAVENOUS at 20:51

## 2024-11-16 RX ADMIN — MIDAZOLAM 9 MG/HR: 5 INJECTION INTRAMUSCULAR; INTRAVENOUS at 00:32

## 2024-11-16 RX ADMIN — CHLORHEXIDINE GLUCONATE 0.12% ORAL RINSE 15 ML: 1.2 LIQUID ORAL at 21:05

## 2024-11-16 RX ADMIN — PIPERACILLIN SODIUM AND TAZOBACTAM SODIUM 4.5 G: 36; 4.5 INJECTION, POWDER, LYOPHILIZED, FOR SOLUTION INTRAVENOUS at 04:28

## 2024-11-16 RX ADMIN — PHENYTOIN 100 MG: 125 SUSPENSION ORAL at 18:11

## 2024-11-16 RX ADMIN — CHLORHEXIDINE GLUCONATE 0.12% ORAL RINSE 15 ML: 1.2 LIQUID ORAL at 08:55

## 2024-11-16 RX ADMIN — LEVALBUTEROL HYDROCHLORIDE 1.25 MG: 1.25 SOLUTION RESPIRATORY (INHALATION) at 07:26

## 2024-11-16 RX ADMIN — THIAMINE HYDROCHLORIDE 500 MG: 100 INJECTION, SOLUTION INTRAMUSCULAR; INTRAVENOUS at 17:23

## 2024-11-16 RX ADMIN — ATORVASTATIN CALCIUM 10 MG: 10 TABLET, FILM COATED ORAL at 16:17

## 2024-11-16 RX ADMIN — NOREPINEPHRINE BITARTRATE 10 MCG/MIN: 1 INJECTION, SOLUTION, CONCENTRATE INTRAVENOUS at 06:28

## 2024-11-16 RX ADMIN — VALPROATE SODIUM 375 MG: 100 INJECTION, SOLUTION INTRAVENOUS at 22:00

## 2024-11-16 NOTE — PROGRESS NOTES
"Progress Note - Neurology   Name: Adelina Soto 64 y.o. female I MRN: [id number]  Unit/Bed#: MICU 01 I Date of Admission: 11/12/2024   Date of Service: 11/16/2024 I Hospital Day: 4    Assessment & Plan  Seizure (HCC)  Adelina Soto is a 64 year old woman with PMHx including HTN, HLD, hypothyroidism, A fib on Warfarin, DM, GERD, anxiety who presented to the hospital for evaluation of altered mental status. Stroke alert called due to gaze deviation and altered mentation. Pt was noted to have two witnessed seizures prior to arrival, and received 6 mg Versed prior to arrival. Continued to have left gaze preference for ED, with lack of command following although was moving her LUE spontaneously. CT head and CTA H/N with no findings that could explain her presentation. After return from CT scan, appeared to have some improvement in her exam but still off from her baseline on discussion with sister at bedside. Concern for status epilepticus with multiple seizures without return to baseline as of yet. Otherwise compliant with Coumadin, with INR 2.42.     VEEG monitoring report of 08:00 11/13: 2:20 this morning and around 02:40 started to have repetitive left frontal seizures, every 10 seconds at times. She was started on sedation again shortly after 03:00 and seizures stopped at that point. EEG is now very low amplitude and slow, with frequent left temporal sharps\"    MRI: Limited by motion. Mild restricted diffusion with abnormal FLAIR signal in the mesial left temporal lobe could be postictal. Recommend correlation with CSF to exclude infection such as HSV. Abnormal signal in the right thalamus of uncertain etiology.     Impression: status epilepticus, which required multiple AED & sedation medications to control in setting of no history of seizures & currently unknown etiology, but concern for UTI vs HSV vs meningitis warranting ongoing workup.    TPA Decision: Patient not a candidate. Unclear time of onset " outside appropriate time window. and Bleeding risk. Not an endovascular candidate since no LVO/IR target identified.      Plan:   - Rec trough total levels 30-60 min prior to evening ASM dosing  - Await final CSF results immuno vs HSV  - Recommend loading bolus of VPA and fosphenytoin 500 mg each.  - Increase maintenance phenytoin to 130 mg q8h   - Increase maintenance VPA to 375 mg q6h  - Seizure precautions  - Telemetry rec  - Will need DMV report  - Urine cx ESBL MDRO, zosyn begun  - Goal of normothermia and euglycemia  - Therapy when able  - Continue home Warfarin  - Systolic BP goal less than 180  - Continue to monitor closely for changes in examination, notify Neurology if so  - Rest of care per primary  - Seizure precautions outpatient: no driving, no operating heavy machinery, no swimming alone, no climbing, no baths only showers, no cooking alone, or any activity that would put them at increased risk of harm if they were to have a seizure.  Abnormal EKG    Elevated troponin level not due myocardial infarction    Bacteriuria    Shock (HCC)    Acute hypoxic respiratory failure (HCC)      Recommendations for outpatient neurological follow up have yet to be determined.  I have discussed with Dr. Bazan the above plan to treat pt. He agrees with the plan.  Please contact the SecureChat role for the Neurology service with any questions/concerns.    Subjective   Patient was seen and evaluated, currently remains comatose, Versed weaned off this AM.    Review of Systems   Unable to perform ROS: Intubated         Objective :  Temp:  [97.3 °F (36.3 °C)-101.1 °F (38.4 °C)] 97.5 °F (36.4 °C)  HR:  [] 92  BP: (106-111)/(53-67) 111/67  Resp:  [16-22] 16  SpO2:  [86 %-100 %] 98 %  O2 Device: Ventilator    Physical Exam  Constitutional:       Appearance: She is ill-appearing. She is not diaphoretic.   HENT:      Head: Normocephalic.      Right Ear: External ear normal.      Left Ear: External ear normal.      Nose:  Nose normal.   Eyes:      General: No scleral icterus.        Right eye: No discharge.         Left eye: No discharge.      Conjunctiva/sclera: Conjunctivae normal.      Pupils: Pupils are equal, round, and reactive to light.   Neurological:      Deep Tendon Reflexes:      Reflex Scores:       Tricep reflexes are 2+ on the right side and 2+ on the left side.       Bicep reflexes are 2+ on the right side and 2+ on the left side.       Brachioradialis reflexes are 2+ on the right side and 2+ on the left side.       Patellar reflexes are 1+ on the right side and 1+ on the left side.    Neurological Exam  Mental Status    Not on sedation, versed stopped this AM.    Cranial Nerves  CN III, IV, VI: Pupils equal round and reactive to light bilaterally.  Intubated, corneals unreactive b/l.    Motor  Normal muscle bulk throughout. No fasciculations present. Normal muscle tone. No abnormal involuntary movements.  No movement to pain .    Sensory  Intubated.    Reflexes                                            Right                      Left  Brachioradialis                    2+                         2+  Biceps                                 2+                         2+  Triceps                                2+                         2+  Patellar                                1+                         1+    Coordination    Intubated.    Gait    Intubated.        Lab Results: I have reviewed the following results:  Imaging Results Review: No pertinent imaging studies reviewed.  Other Study Results Review: No additional pertinent studies reviewed.    VTE Pharmacologic Prophylaxis: VTE covered by:  heparin (porcine), Intravenous, 10.1 Units/kg/hr at 11/16/24 [id number]

## 2024-11-16 NOTE — PROCEDURES
Arterial Line Insertion    Date/Time: 11/15/2024 7:50 PM    Performed by: TULIO Leslie  Authorized by: TULOI Leslie    Patient location:  ICU  Other Assisting Provider: No    Consent:     Consent obtained:  Emergent situation  Universal protocol:     Procedure explained and questions answered to patient or proxy's satisfaction: yes      Immediately prior to procedure a time out was called: yes      Patient identity confirmed:  Arm band  Indications:     Indications: hemodynamic monitoring and continuous blood pressure monitoring    Pre-procedure details:     Skin preparation:  Chlorhexidine    Preparation: Patient was prepped and draped in sterile fashion    Anesthesia (see MAR for exact dosages):     Anesthesia method:  None  Procedure details:     Location / Tip of Catheter:  Radial    Laterality:  Left    Kodak's test performed: no      Needle gauge:  20 G    Placement technique:  Seldinger and ultrasound guided    Ultrasound image availability:  Not saved    Sterile ultrasound techniques: Sterile gel and sterile probe covers were used      Number of attempts:  1    Successful placement: yes      Transducer: waveform confirmed    Post-procedure details:     Post-procedure:  Sterile dressing applied and sutured    CMS:  Unable to assess    Patient tolerance of procedure:  Tolerated well, no immediate complications

## 2024-11-16 NOTE — PLAN OF CARE
Problem: PAIN - ADULT  Goal: Verbalizes/displays adequate comfort level or baseline comfort level  Description: Interventions:  - Encourage patient to monitor pain and request assistance  - Assess pain using appropriate pain scale  - Administer analgesics based on type and severity of pain and evaluate response  - Implement non-pharmacological measures as appropriate and evaluate response  - Consider cultural and social influences on pain and pain management  - Notify physician/advanced practitioner if interventions unsuccessful or patient reports new pain  Outcome: Progressing     Problem: INFECTION - ADULT  Goal: Absence or prevention of progression during hospitalization  Description: INTERVENTIONS:  - Assess and monitor for signs and symptoms of infection  - Monitor lab/diagnostic results  - Monitor all insertion sites, i.e. indwelling lines, tubes, and drains  - Monitor endotracheal if appropriate and nasal secretions for changes in amount and color  - [place]ove appropriate cooling/warming therapies per order  - Administer medications as ordered  - Instruct and encourage patient and family to use good hand hygiene technique  - Identify and instruct in appropriate isolation precautions for identified infection/condition  Outcome: Progressing  Goal: Absence of fever/infection during neutropenic period  Description: INTERVENTIONS:  - Monitor WBC    Outcome: Progressing     Problem: SAFETY ADULT  Goal: Patient will remain free of falls  Description: INTERVENTIONS:  - Educate patient/family on patient safety including physical limitations  - Instruct patient to call for assistance with activity   - Consult OT/PT to assist with strengthening/mobility   - Keep Call bell within reach  - Keep bed low and locked with side rails adjusted as appropriate  - Keep care items and personal belongings within reach  - Initiate and maintain comfort rounds  - Make Fall Risk Sign visible to staff  - Apply yellow socks and bracelet  for high fall risk patients  - Consider moving patient to room near nurses station  Outcome: Progressing  Goal: Maintain or return to baseline ADL function  Description: INTERVENTIONS:  -  Assess patient's ability to carry out ADLs; assess patient's baseline for ADL function and identify physical deficits which impact ability to perform ADLs (bathing, care of mouth/teeth, toileting, grooming, dressing, etc.)  - Assess/evaluate cause of self-care deficits   - Assess range of motion  - Assess patient's mobility; develop plan if impaired  - Assess patient's need for assistive devices and provide as appropriate  - Encourage maximum independence but intervene and supervise when necessary  - Involve family in performance of ADLs  - Assess for home care needs following discharge   - Consider OT consult to assist with ADL evaluation and planning for discharge  - Provide patient education as appropriate  Outcome: Progressing  Goal: Maintains/Returns to pre admission functional level  Description: INTERVENTIONS:  - Perform AM-PAC 6 Click Basic Mobility/ Daily Activity assessment daily.  - Set and communicate daily mobility goal to care team and patient/family/caregiver.   - Collaborate with rehabilitation services on mobility goals if consulted  - Record patient progress and toleration of activity level   Outcome: Progressing     Problem: DISCHARGE PLANNING  Goal: Discharge to home or other facility with appropriate resources  Description: INTERVENTIONS:  - Identify barriers to discharge w/patient and caregiver  - Arrange for needed discharge resources and transportation as appropriate  - Identify discharge learning needs (meds, wound care, etc.)  - Arrange for interpretive services to assist at discharge as needed  - Refer to Case Management Department for coordinating discharge planning if the patient needs post-hospital services based on physician/advanced practitioner order or complex needs related to functional status,  cognitive ability, or social support system  Outcome: Progressing     Problem: Knowledge Deficit  Goal: Patient/family/caregiver demonstrates understanding of disease process, treatment plan, medications, and discharge instructions  Description: Complete learning assessment and assess knowledge base.  Interventions:  - Provide teaching at level of understanding  - Provide teaching via preferred learning methods  Outcome: Progressing     Problem: NEUROSENSORY - ADULT  Goal: Achieves stable or improved neurological status  Description: INTERVENTIONS  - Monitor and report changes in neurological status  - Monitor vital signs such as temperature, blood pressure, glucose, and any other labs ordered   - Initiate measures to prevent increased intracranial pressure  - Monitor for seizure activity and implement precautions if appropriate      Outcome: Progressing  Goal: Remains free of injury related to seizures activity  Description: INTERVENTIONS  - Maintain airway, patient safety  and administer oxygen as ordered  - Monitor patient for seizure activity, document and report duration and description of seizure to physician/advanced practitioner  - If seizure occurs,  ensure patient safety during seizure  - Reorient patient post seizure  - Seizure pads on all 4 side rails  - Instruct patient/family to notify RN of any seizure activity including if an aura is experienced  - Instruct patient/family to call for assistance with activity based on nursing assessment  - Administer anti-seizure medications if ordered    Outcome: Progressing  Goal: Achieves maximal functionality and self care  Description: INTERVENTIONS  - Monitor swallowing and airway patency with patient fatigue and changes in neurological status  - Encourage and assist patient to increase activity and self care.   - Encourage visually impaired, hearing impaired and aphasic patients to use assistive/communication devices  Outcome: Progressing     Problem:  CARDIOVASCULAR - ADULT  Goal: Maintains optimal cardiac output and hemodynamic stability  Description: INTERVENTIONS:  - Monitor I/O, vital signs and rhythm  - Monitor for S/S and trends of decreased cardiac output  - Administer and titrate ordered vasoactive medications to optimize hemodynamic stability  - Assess quality of pulses, skin color and temperature  - Assess for signs of decreased coronary artery perfusion  - Instruct patient to report change in severity of symptoms  Outcome: Progressing  Goal: Absence of cardiac dysrhythmias or at baseline rhythm  Description: INTERVENTIONS:  - Continuous cardiac monitoring, vital signs, obtain 12 lead EKG if ordered  - Administer antiarrhythmic and heart rate control medications as ordered  - Monitor electrolytes and administer replacement therapy as ordered  Outcome: Progressing     Problem: RESPIRATORY - ADULT  Goal: Achieves optimal ventilation and oxygenation  Description: INTERVENTIONS:  - Assess for changes in respiratory status  - Assess for changes in mentation and behavior  - Position to facilitate oxygenation and minimize respiratory effort  - Oxygen administered by appropriate delivery if ordered  - Initiate smoking cessation education as indicated  - Encourage broncho-pulmonary hygiene including cough, deep breathe, Incentive Spirometry  - Assess the need for suctioning and aspirate as needed  - Assess and instruct to report SOB or any respiratory difficulty  - Respiratory Therapy support as indicated  Outcome: Progressing     Problem: GASTROINTESTINAL - ADULT  Goal: Minimal or absence of nausea and/or vomiting  Description: INTERVENTIONS:  - Administer IV fluids if ordered to ensure adequate hydration  - Maintain NPO status until nausea and vomiting are resolved  - Nasogastric tube if ordered  - Administer ordered antiemetic medications as needed  - Provide nonpharmacologic comfort measures as appropriate  - Advance diet as tolerated, if ordered  - Consider  nutrition services referral to assist patient with adequate nutrition and appropriate food choices  Outcome: Progressing  Goal: Maintains or returns to baseline bowel function  Description: INTERVENTIONS:  - Assess bowel function  - Encourage oral fluids to ensure adequate hydration  - Administer IV fluids if ordered to ensure adequate hydration  - Administer ordered medications as needed  - Encourage mobilization and activity  - Consider nutritional services referral to assist patient with adequate nutrition and appropriate food choices  Outcome: Progressing  Goal: Maintains adequate nutritional intake  Description: INTERVENTIONS:  - Monitor percentage of each meal consumed  - Identify factors contributing to decreased intake, treat as appropriate  - Assist with meals as needed  - Monitor I&O, weight, and lab values if indicated  - Obtain nutrition services referral as needed  Outcome: Progressing  Goal: Establish and maintain optimal ostomy function  Description: INTERVENTIONS:  - Assess bowel function  - Encourage oral fluids to ensure adequate hydration  - Administer IV fluids if ordered to ensure adequate hydration   - Administer ordered medications as needed  - Encourage mobilization and activity  - Nutrition services referral to assist patient with appropriate food choices  - Assess stoma site  - Consider wound care consult   Outcome: Progressing  Goal: Oral mucous membranes remain intact  Description: INTERVENTIONS  - Assess oral mucosa and hygiene practices  - Implement preventative oral hygiene regimen  - Implement oral medicated treatments as ordered  - Initiate Nutrition services referral as needed  Outcome: Progressing     Problem: GENITOURINARY - ADULT  Goal: Maintains or returns to baseline urinary function  Description: INTERVENTIONS:  - Assess urinary function  - Encourage oral fluids to ensure adequate hydration if ordered  - Administer IV fluids as ordered to ensure adequate hydration  -  Administer ordered medications as needed  - Offer frequent toileting  - Follow urinary retention protocol if ordered  Outcome: Progressing  Goal: Absence of urinary retention  Description: INTERVENTIONS:  - Assess patient’s ability to void and empty bladder  - Monitor I/O  - Bladder scan as needed  - Discuss with physician/AP medications to alleviate retention as needed  - Discuss catheterization for long term situations as appropriate  Outcome: Progressing  Goal: Urinary catheter remains patent  Description: INTERVENTIONS:  - Assess patency of urinary catheter  - If patient has a chronic peace, consider changing catheter if non-functioning  - Follow guidelines for intermittent irrigation of non-functioning urinary catheter  Outcome: Progressing     Problem: METABOLIC, FLUID AND ELECTROLYTES - ADULT  Goal: Electrolytes maintained within normal limits  Description: INTERVENTIONS:  - Monitor labs and assess patient for signs and symptoms of electrolyte imbalances  - Administer electrolyte replacement as ordered  - Monitor response to electrolyte replacements, including repeat lab results as appropriate  - Instruct patient on fluid and nutrition as appropriate  Outcome: Progressing  Goal: Fluid balance maintained  Description: INTERVENTIONS:  - Monitor labs   - Monitor I/O and WT  - Instruct patient on fluid and nutrition as appropriate  - Assess for signs & symptoms of volume excess or deficit  Outcome: Progressing  Goal: Glucose maintained within target range  Description: INTERVENTIONS:  - Monitor Blood Glucose as ordered  - Assess for signs and symptoms of hyperglycemia and hypoglycemia  - Administer ordered medications to maintain glucose within target range  - Assess nutritional intake and initiate nutrition service referral as needed  Outcome: Progressing     Problem: HEMATOLOGIC - ADULT  Goal: Maintains hematologic stability  Description: INTERVENTIONS  - Assess for signs and symptoms of bleeding or  hemorrhage  - Monitor labs  - Administer supportive blood products/factors as ordered and appropriate  Outcome: Progressing     Problem: MUSCULOSKELETAL - ADULT  Goal: Maintain or return mobility to safest level of function  Description: INTERVENTIONS:  - Assess patient's ability to carry out ADLs; assess patient's baseline for ADL function and identify physical deficits which impact ability to perform ADLs (bathing, care of mouth/teeth, toileting, grooming, dressing, etc.)  - Assess/evaluate cause of self-care deficits   - Assess range of motion  - Assess patient's mobility  - Assess patient's need for assistive devices and provide as appropriate  - Encourage maximum independence but intervene and supervise when necessary  - Involve family in performance of ADLs  - Assess for home care needs following discharge   - Consider OT consult to assist with ADL evaluation and planning for discharge  - Provide patient education as appropriate  Outcome: Progressing  Goal: Maintain proper alignment of affected body part  Description: INTERVENTIONS:  - Support, maintain and protect limb and body alignment  - Provide patient/ family with appropriate education  Outcome: Progressing     Problem: Nutrition/Hydration-ADULT  Goal: Nutrient/Hydration intake appropriate for improving, restoring or maintaining nutritional needs  Description: Monitor and assess patient's nutrition/hydration status for malnutrition. Collaborate with interdisciplinary team and initiate plan and interventions as ordered.  Monitor patient's weight and dietary intake as ordered or per policy. Utilize nutrition screening tool and intervene as necessary. Determine patient's food preferences and provide high-protein, high-caloric foods as appropriate.     INTERVENTIONS:  - Monitor oral intake, urinary output, labs, and treatment plans  - Assess nutrition and hydration status and recommend course of action  - Evaluate amount of meals eaten  - Assist patient with  eating if necessary   - Allow adequate time for meals  - Recommend/ encourage appropriate diets, oral nutritional supplements, and vitamin/mineral supplements  - Order, calculate, and assess calorie counts as needed  - Recommend, monitor, and adjust tube feedings and TPN/PPN based on assessed needs  - Assess need for intravenous fluids  - Provide specific nutrition/hydration education as appropriate  - Include patient/family/caregiver in decisions related to nutrition  Outcome: Progressing     Problem: Prexisting or High Potential for Compromised Skin Integrity  Goal: Skin integrity is maintained or improved  Description: INTERVENTIONS:  - Identify patients at risk for skin breakdown  - Assess and monitor skin integrity  - Assess and monitor nutrition and hydration status  - Monitor labs   - Assess for incontinence   - Turn and reposition patient  - Assist with mobility/ambulation  - Relieve pressure over bony prominences  - Avoid friction and shearing  - Provide appropriate hygiene as needed including keeping skin clean and dry  - Evaluate need for skin moisturizer/barrier cream  - Collaborate with interdisciplinary team   - Patient/family teaching  - Consider wound care consult   Outcome: Progressing     Problem: SAFETY,RESTRAINT: NV/NON-SELF DESTRUCTIVE BEHAVIOR  Goal: Remains free of harm/injury (restraint for non violent/non self-detsructive behavior)  Description: INTERVENTIONS:  - Instruct patient/family regarding restraint use   - Assess and monitor physiologic and psychological status   - Provide interventions and comfort measures to meet assessed patient needs   - Identify and implement measures to help patient regain control  - Assess readiness for release of restraint   Outcome: Progressing  Goal: Returns to optimal restraint-free functioning  Description: INTERVENTIONS:  - Assess the patient's behavior and symptoms that indicate continued need for restraint  - Identify and implement measures to help  patient regain control  - Assess readiness for release of restraint   Outcome: Progressing

## 2024-11-16 NOTE — ASSESSMENT & PLAN NOTE
Lab Results   Component Value Date    EGFR 37 11/16/2024    EGFR 37 11/15/2024    EGFR 31 11/14/2024    CREATININE 1.47 (H) 11/16/2024    CREATININE 1.46 (H) 11/15/2024    CREATININE 1.70 (H) 11/14/2024   Baseline creatinine 1.7-1.9.  Creatinine currently at baseline and stable.  Dose adjust antibiotic car wheels as needed for creatinine clearance.  Monitor creatinine daily

## 2024-11-16 NOTE — ASSESSMENT & PLAN NOTE
"Adelina Soto is a 64 year old woman with PMHx including HTN, HLD, hypothyroidism, A fib on Warfarin, DM, GERD, anxiety who presented to the hospital for evaluation of altered mental status. Stroke alert called due to gaze deviation and altered mentation. Pt was noted to have two witnessed seizures prior to arrival, and received 6 mg Versed prior to arrival. Continued to have left gaze preference for ED, with lack of command following although was moving her LUE spontaneously. CT head and CTA H/N with no findings that could explain her presentation. After return from CT scan, appeared to have some improvement in her exam but still off from her baseline on discussion with sister at bedside. Concern for status epilepticus with multiple seizures without return to baseline as of yet. Otherwise compliant with Coumadin, with INR 2.42.     VEEG monitoring report of 08:00 11/13: 2:20 this morning and around 02:40 started to have repetitive left frontal seizures, every 10 seconds at times. She was started on sedation again shortly after 03:00 and seizures stopped at that point. EEG is now very low amplitude and slow, with frequent left temporal sharps\"    MRI: Limited by motion. Mild restricted diffusion with abnormal FLAIR signal in the mesial left temporal lobe could be postictal. Recommend correlation with CSF to exclude infection such as HSV. Abnormal signal in the right thalamus of uncertain etiology.     Impression: status epilepticus, which required multiple AED & sedation medications to control in setting of no history of seizures & currently unknown etiology, but concern for UTI vs HSV vs meningitis warranting ongoing workup.    TPA Decision: Patient not a candidate. Unclear time of onset outside appropriate time window. and Bleeding risk. Not an endovascular candidate since no LVO/IR target identified.      Plan:   - Rec trough total levels 30-60 min prior to evening ASM dosing  - Await final CSF results immuno " vs HSV  - Recommend loading bolus of VPA and fosphenytoin 500 mg each.  - Increase maintenance phenytoin to 130 mg q8h   - Increase maintenance VPA to 375 mg q6h  - Seizure precautions  - Telemetry rec  - Will need DMV report  - Urine cx ESBL MDRO, zosyn begun  - Goal of normothermia and euglycemia  - Therapy when able  - Continue home Warfarin  - Systolic BP goal less than 180  - Continue to monitor closely for changes in examination, notify Neurology if so  - Rest of care per primary  - Seizure precautions outpatient: no driving, no operating heavy machinery, no swimming alone, no climbing, no baths only showers, no cooking alone, or any activity that would put them at increased risk of harm if they were to have a seizure.

## 2024-11-16 NOTE — RESPIRATORY THERAPY NOTE
RT Ventilator Management Note  Adelina Soto 64 y.o. female MRN: [id number]  Unit/Bed#: [facility] 01 Encounter: [id number]      Daily Screen         11/14/2024  0700 11/15/2024  0702          Patient safety screen outcome:: Failed Failed      Not Ready for Weaning due to:: Underline problem not resolved Underline problem not resolved                Physical Exam:   Assessment Type: Assess only  General Appearance: Sedated  Respiratory Pattern: Assisted  Chest Assessment: Chest expansion symmetrical  Bilateral Breath Sounds: Diminished, Coarse  O2 Device: vent      Resp Comments: (P) Pt recieved on vent settings as documented.  UDN given./  Tollerating well @ this time.

## 2024-11-16 NOTE — PROGRESS NOTES
Progress Note - Infectious Disease   Name: Adelina Soto 64 y.o. female I MRN: [id number]  Unit/Bed#: [facility] 01 I Date of Admission: 11/12/2024   Date of Service: 11/16/2024 I Hospital Day: 4    Assessment & Plan  Seizure (HCC)   Patient presented with acute encephalopathy and seizure-like activity.  Video EEG concerning for status epilepticus.  MRI brain without contrast was limited by motion but showed restricted diffusion in the left temporal lobe which could be postictal versus infection.  Status post LP 11/12 and CSF studies unremarkable, not consistent with infection with 2 WBCs.  Protein mildly elevated which could be due to seizure activity itself.  ME panel negative, stand alone HSV PCR also negative.  However with new onset seizures and possible abnormality of the temporal lobe HSV remains a possibility as PCR may be negative and CSF bland and early disease.  CSF culture with no growth.  The patient has grown E. coli and ESBL Proteus in urine culture but in the absence of prior systemic signs of infection or localizing symptoms UTI, doubt this is causing status epilepticus. HIV negative.                -For now continue IV acyclovir 10 mg/kg every 12 hours              -Follow-up pending blood cultures              -MRI brain with contrast when able              -follow up CT C/A/P with contrast to rule out any infection or inflammatory process that may be contributing to seizure-like activity                     -Recommend repeat LP 11/18 to reassess CSF; if HSV remains negative and CSF bland then stop acyclovir    -AEDs per neurology  Bacteriuria  UA with pyuria and urine culture growing E. coli and ESBL Proteus.  No symptoms of infection prior to admission.  Unlikely cause of status epilepticus however in absence of clear cause of seizure activity and while awaiting CT imaging will continue treatment   -Continue Zosyn for now   -If no signs of pyelonephritis on imaging, treat x 3 days which would be  sufficient for cystitis  Shock (HCC)  May be multifactorial related to possible infection, sedating medications.  Patient remains on vasopressor support.   -Hemodynamic support per critical care team   -Antimicrobials as above  Acute hypoxic respiratory failure (HCC)  Patient intubated for airway protection.  Status post bronchoscopy 11/13 for mucous plugging and thick left-sided secretions seen.  Chest x-ray not consistent with pneumonia.  BAL culture shows growth of mixed respiratory viktoriya.   -Ventilator management per critical care team  Morbid obesity with BMI of 50.0-59.9, adult ([facility])  Affects antimicrobial dosing  CKD (chronic kidney disease)  Lab Results   Component Value Date    EGFR 37 11/16/2024    EGFR 37 11/15/2024    EGFR 31 11/14/2024    CREATININE 1.47 (H) 11/16/2024    CREATININE 1.46 (H) 11/15/2024    CREATININE 1.70 (H) 11/14/2024   Baseline creatinine 1.7-1.9.  Creatinine currently at baseline and stable.  Dose adjust antibiotic car wheels as needed for creatinine clearance.  Monitor creatinine daily  Type 2 diabetes mellitus with hyperglycemia, unspecified whether long term insulin use ([facility])  Lab Results   Component Value Date    HGBA1C 5.7 (H) 11/13/2024   Diabetes well-controlled.  Glycemic management per primary team    Discussed with patient's partner, who is present at bedside, in detail regarding the above plan.  I have discussed with Dr. Dominguez from critical care medicine service regarding the above plan to continue acyclovir pending MRI and repeat LP.  She agrees with the plan.    Antibiotics:  Acyclovir day 4  Zosyn day 2    Subjective   Patient remains unresponsive, on ventilator.  Temperature is now down.    Objective :  Temp:  [97.3 °F (36.3 °C)-101.1 °F (38.4 °C)] 97.3 °F (36.3 °C)  HR:  [] 92  BP: ()/(50-67) 111/67  Resp:  [17-22] 20  SpO2:  [86 %-100 %] 97 %  O2 Device: Ventilator    Physical Exam:     General: And responsive but noncommunicating, on ventilator.   Comfortable.  Nontoxic.   Neck:  Supple. No mass.  No lymphadenopathy.   Lungs: Expansion symmetric, mild diffuse rhonchi, no rales, no wheezing.   Heart:  Regular rate and rhythm, S1 and S2 normal, no murmur.   Abdomen: Soft, nondistended, non-tender, bowel sounds active all four quadrants, no masses, no organomegaly.   Extremities: Stable mild leg edema. No erythema/warmth. No ulcer. Nontender to palpation.   Skin:  No rash.   Neuro: Not assessable.        Lab Results: I have reviewed the following results:  Results from last 7 days   Lab Units 11/16/24  0503 11/15/24  0524 11/14/24  0428   WBC Thousand/uL 10.83* 11.81* 11.37*   HEMOGLOBIN g/dL 10.2* 10.5* 10.5*   PLATELETS Thousands/uL 208 243 272     Results from last 7 days   Lab Units 11/16/24  0503 11/15/24  0524 11/14/24  0428   SODIUM mmol/L 137 138 139   POTASSIUM mmol/L 3.8 3.8 3.5   CHLORIDE mmol/L 110* 108 104   CO2 mmol/L 20* 22 21   BUN mg/dL 15 14 19   CREATININE mg/dL 1.47* 1.46* 1.70*   EGFR ml/min/1.73sq m 37 37 31   CALCIUM mg/dL 7.3* 7.4* 8.0*   AST U/L 17 16 21   ALT U/L 6* 6* 7   ALK PHOS U/L 92 95 99   ALBUMIN g/dL 2.3* 2.5* 3.0*     Results from last 7 days   Lab Units 11/12/24  2353 11/12/24  2238 11/12/24  2228 11/12/24  2039   BLOOD CULTURE   --  No Growth at 72 hrs. No Growth at 72 hrs.  --    GRAM STAIN RESULT  1+ Gram negative rods*  1+ Gram positive cocci in clusters*  1+ Polys*  --   --   --    URINE CULTURE   --   --   --  >100,000 cfu/ml Escherichia coli*  >100,000 cfu/ml Proteus mirabilis ESBL*     Results from last 7 days   Lab Units 11/12/24  2132   PROCALCITONIN ng/ml 0.36*

## 2024-11-16 NOTE — ASSESSMENT & PLAN NOTE
Patient presented with acute encephalopathy and seizure-like activity.  Video EEG concerning for status epilepticus.  MRI brain without contrast was limited by motion but showed restricted diffusion in the left temporal lobe which could be postictal versus infection.  Status post LP 11/12 and CSF studies unremarkable, not consistent with infection with 2 WBCs.  Protein mildly elevated which could be due to seizure activity itself.  ME panel negative, stand alone HSV PCR also negative.  However with new onset seizures and possible abnormality of the temporal lobe HSV remains a possibility as PCR may be negative and CSF bland and early disease.  CSF culture with no growth.  The patient has grown E. coli and ESBL Proteus in urine culture but in the absence of prior systemic signs of infection or localizing symptoms UTI, doubt this is causing status epilepticus. HIV negative.                -For now continue IV acyclovir 10 mg/kg every 12 hours              -Follow-up pending blood cultures              -MRI brain with contrast when able              -follow up CT C/A/P with contrast to rule out any infection or inflammatory process that may be contributing to seizure-like activity                     -Recommend repeat LP 11/18 to reassess CSF; if HSV remains negative and CSF bland then stop acyclovir    -AEDs per neurology

## 2024-11-16 NOTE — PLAN OF CARE
Problem: PAIN - ADULT  Goal: Verbalizes/displays adequate comfort level or baseline comfort level  Description: Interventions:  - Encourage patient to monitor pain and request assistance  - Assess pain using appropriate pain scale  - Administer analgesics based on type and severity of pain and evaluate response  - Implement non-pharmacological measures as appropriate and evaluate response  - Consider cultural and social influences on pain and pain management  - Notify physician/advanced practitioner if interventions unsuccessful or patient reports new pain  Outcome: Progressing     Problem: INFECTION - ADULT  Goal: Absence or prevention of progression during hospitalization  Description: INTERVENTIONS:  - Assess and monitor for signs and symptoms of infection  - Monitor lab/diagnostic results  - Monitor all insertion sites, i.e. indwelling lines, tubes, and drains  - Monitor endotracheal if appropriate and nasal secretions for changes in amount and color  - Kittrell appropriate cooling/warming therapies per order  - Administer medications as ordered  - Instruct and encourage patient and family to use good hand hygiene technique  - Identify and instruct in appropriate isolation precautions for identified infection/condition  Outcome: Progressing  Goal: Absence of fever/infection during neutropenic period  Description: INTERVENTIONS:  - Monitor WBC    Outcome: Progressing     Problem: SAFETY ADULT  Goal: Patient will remain free of falls  Description: INTERVENTIONS:  - Educate patient/family on patient safety including physical limitations  - Instruct patient to call for assistance with activity   - Consult OT/PT to assist with strengthening/mobility   - Keep Call bell within reach  - Keep bed low and locked with side rails adjusted as appropriate  - Keep care items and personal belongings within reach  - Initiate and maintain comfort rounds  - Make Fall Risk Sign visible to staff  - Offer Toileting every  Hours,  in advance of need  - Initiate/Maintainalarm  - Obtain necessary fall risk management equipment:   - Apply yellow socks and bracelet for high fall risk patients  - Consider moving patient to room near nurses station  Outcome: Progressing  Goal: Maintain or return to baseline ADL function  Description: INTERVENTIONS:  -  Assess patient's ability to carry out ADLs; assess patient's baseline for ADL function and identify physical deficits which impact ability to perform ADLs (bathing, care of mouth/teeth, toileting, grooming, dressing, etc.)  - Assess/evaluate cause of self-care deficits   - Assess range of motion  - Assess patient's mobility; develop plan if impaired  - Assess patient's need for assistive devices and provide as appropriate  - Encourage maximum independence but intervene and supervise when necessary  - Involve family in performance of ADLs  - Assess for home care needs following discharge   - Consider OT consult to assist with ADL evaluation and planning for discharge  - Provide patient education as appropriate  Outcome: Progressing  Goal: Maintains/Returns to pre admission functional level  Description: INTERVENTIONS:  - Perform AM-PAC 6 Click Basic Mobility/ Daily Activity assessment daily.  - Set and communicate daily mobility goal to care team and patient/family/caregiver.   - Collaborate with rehabilitation services on mobility goals if consulted  - Perform Range of Motion 3 times a day.  - Reposition patient every 2 hours.  - Dangle patient 3 times a day  - Stand patient 3 times a day  - Ambulate patient 3 times a day  - Out of bed to chair 3 times a day   - Out of bed for meals 3 times a day  - Out of bed for toileting  - Record patient progress and toleration of activity level   Outcome: Progressing     Problem: DISCHARGE PLANNING  Goal: Discharge to home or other facility with appropriate resources  Description: INTERVENTIONS:  - Identify barriers to discharge w/patient and caregiver  -  Arrange for needed discharge resources and transportation as appropriate  - Identify discharge learning needs (meds, wound care, etc.)  - Arrange for interpretive services to assist at discharge as needed  - Refer to Case Management Department for coordinating discharge planning if the patient needs post-hospital services based on physician/advanced practitioner order or complex needs related to functional status, cognitive ability, or social support system  Outcome: Progressing     Problem: Knowledge Deficit  Goal: Patient/family/caregiver demonstrates understanding of disease process, treatment plan, medications, and discharge instructions  Description: Complete learning assessment and assess knowledge base.  Interventions:  - Provide teaching at level of understanding  - Provide teaching via preferred learning methods  Outcome: Progressing     Problem: NEUROSENSORY - ADULT  Goal: Achieves stable or improved neurological status  Description: INTERVENTIONS  - Monitor and report changes in neurological status  - Monitor vital signs such as temperature, blood pressure, glucose, and any other labs ordered   - Initiate measures to prevent increased intracranial pressure  - Monitor for seizure activity and implement precautions if appropriate      Outcome: Progressing  Goal: Remains free of injury related to seizures activity  Description: INTERVENTIONS  - Maintain airway, patient safety  and administer oxygen as ordered  - Monitor patient for seizure activity, document and report duration and description of seizure to physician/advanced practitioner  - If seizure occurs,  ensure patient safety during seizure  - Reorient patient post seizure  - Seizure pads on all 4 side rails  - Instruct patient/family to notify RN of any seizure activity including if an aura is experienced  - Instruct patient/family to call for assistance with activity based on nursing assessment  - Administer anti-seizure medications if  ordered    Outcome: Progressing  Goal: Achieves maximal functionality and self care  Description: INTERVENTIONS  - Monitor swallowing and airway patency with patient fatigue and changes in neurological status  - Encourage and assist patient to increase activity and self care.   - Encourage visually impaired, hearing impaired and aphasic patients to use assistive/communication devices  Outcome: Progressing     Problem: CARDIOVASCULAR - ADULT  Goal: Maintains optimal cardiac output and hemodynamic stability  Description: INTERVENTIONS:  - Monitor I/O, vital signs and rhythm  - Monitor for S/S and trends of decreased cardiac output  - Administer and titrate ordered vasoactive medications to optimize hemodynamic stability  - Assess quality of pulses, skin color and temperature  - Assess for signs of decreased coronary artery perfusion  - Instruct patient to report change in severity of symptoms  Outcome: Progressing  Goal: Absence of cardiac dysrhythmias or at baseline rhythm  Description: INTERVENTIONS:  - Continuous cardiac monitoring, vital signs, obtain 12 lead EKG if ordered  - Administer antiarrhythmic and heart rate control medications as ordered  - Monitor electrolytes and administer replacement therapy as ordered  Outcome: Progressing     Problem: RESPIRATORY - ADULT  Goal: Achieves optimal ventilation and oxygenation  Description: INTERVENTIONS:  - Assess for changes in respiratory status  - Assess for changes in mentation and behavior  - Position to facilitate oxygenation and minimize respiratory effort  - Oxygen administered by appropriate delivery if ordered  - Initiate smoking cessation education as indicated  - Encourage broncho-pulmonary hygiene including cough, deep breathe, Incentive Spirometry  - Assess the need for suctioning and aspirate as needed  - Assess and instruct to report SOB or any respiratory difficulty  - Respiratory Therapy support as indicated  Outcome: Progressing     Problem:  GASTROINTESTINAL - ADULT  Goal: Minimal or absence of nausea and/or vomiting  Description: INTERVENTIONS:  - Administer IV fluids if ordered to ensure adequate hydration  - Maintain NPO status until nausea and vomiting are resolved  - Nasogastric tube if ordered  - Administer ordered antiemetic medications as needed  - Provide nonpharmacologic comfort measures as appropriate  - Advance diet as tolerated, if ordered  - Consider nutrition services referral to assist patient with adequate nutrition and appropriate food choices  Outcome: Progressing  Goal: Maintains or returns to baseline bowel function  Description: INTERVENTIONS:  - Assess bowel function  - Encourage oral fluids to ensure adequate hydration  - Administer IV fluids if ordered to ensure adequate hydration  - Administer ordered medications as needed  - Encourage mobilization and activity  - Consider nutritional services referral to assist patient with adequate nutrition and appropriate food choices  Outcome: Progressing  Goal: Maintains adequate nutritional intake  Description: INTERVENTIONS:  - Monitor percentage of each meal consumed  - Identify factors contributing to decreased intake, treat as appropriate  - Assist with meals as needed  - Monitor I&O, weight, and lab values if indicated  - Obtain nutrition services referral as needed  Outcome: Progressing  Goal: Establish and maintain optimal ostomy function  Description: INTERVENTIONS:  - Assess bowel function  - Encourage oral fluids to ensure adequate hydration  - Administer IV fluids if ordered to ensure adequate hydration   - Administer ordered medications as needed  - Encourage mobilization and activity  - Nutrition services referral to assist patient with appropriate food choices  - Assess stoma site  - Consider wound care consult   Outcome: Progressing  Goal: Oral mucous membranes remain intact  Description: INTERVENTIONS  - Assess oral mucosa and hygiene practices  - Implement preventative  oral hygiene regimen  - Implement oral medicated treatments as ordered  - Initiate Nutrition services referral as needed  Outcome: Progressing     Problem: GENITOURINARY - ADULT  Goal: Maintains or returns to baseline urinary function  Description: INTERVENTIONS:  - Assess urinary function  - Encourage oral fluids to ensure adequate hydration if ordered  - Administer IV fluids as ordered to ensure adequate hydration  - Administer ordered medications as needed  - Offer frequent toileting  - Follow urinary retention protocol if ordered  Outcome: Progressing  Goal: Absence of urinary retention  Description: INTERVENTIONS:  - Assess patient’s ability to void and empty bladder  - Monitor I/O  - Bladder scan as needed  - Discuss with physician/AP medications to alleviate retention as needed  - Discuss catheterization for long term situations as appropriate  Outcome: Progressing  Goal: Urinary catheter remains patent  Description: INTERVENTIONS:  - Assess patency of urinary catheter  - If patient has a chronic peace, consider changing catheter if non-functioning  - Follow guidelines for intermittent irrigation of non-functioning urinary catheter  Outcome: Progressing     Problem: METABOLIC, FLUID AND ELECTROLYTES - ADULT  Goal: Electrolytes maintained within normal limits  Description: INTERVENTIONS:  - Monitor labs and assess patient for signs and symptoms of electrolyte imbalances  - Administer electrolyte replacement as ordered  - Monitor response to electrolyte replacements, including repeat lab results as appropriate  - Instruct patient on fluid and nutrition as appropriate  Outcome: Progressing  Goal: Fluid balance maintained  Description: INTERVENTIONS:  - Monitor labs   - Monitor I/O and WT  - Instruct patient on fluid and nutrition as appropriate  - Assess for signs & symptoms of volume excess or deficit  Outcome: Progressing  Goal: Glucose maintained within target range  Description: INTERVENTIONS:  - Monitor  Blood Glucose as ordered  - Assess for signs and symptoms of hyperglycemia and hypoglycemia  - Administer ordered medications to maintain glucose within target range  - Assess nutritional intake and initiate nutrition service referral as needed  Outcome: Progressing     Problem: SKIN/TISSUE INTEGRITY - ADULT  Goal: Skin Integrity remains intact(Skin Breakdown Prevention)  Description: Assess:  -Perform Carmelo assessment   -Clean and moisturize skin   -Inspect skin when repositioning, toileting, and assisting with ADLS  -Assess under medical devices   -Assess extremities for adequate circulation and sensation     Bed Management:  -Have minimal linens on bed & keep smooth, unwrinkled  -Change linens as needed when moist or perspiring  -Avoid sitting or lying in one position for more than 2 hours while in bed  -Keep HOB at 30 degrees     Toileting:  -Offer bedside commode  -Assess for incontinence   -Use incontinent care products after each incontinent episode    Activity:  -Mobilize patient 3 times a day  -Encourage activity and walks on unit  -Encourage or provide ROM exercises   -Turn and reposition patient every 2 Hours  -Use appropriate equipment to lift or move patient in bed  -Instruct/ Assist with weight shifting every when out of bed in chair  -Consider limitation of chair time hour intervals    Skin Care:  -Avoid use of baby powder, tape, friction and shearing, hot water or constrictive clothing  -Relieve pressure over bony prominences  -Do not massage red bony areas    Next Steps:  -Teach patient strategies to minimize risks    -Consider consults to  interdisciplinary teams   Outcome: Progressing  Goal: Incision(s), wounds(s) or drain site(s) healing without S/S of infection  Description: INTERVENTIONS  - Assess and document dressing, incision, wound bed, drain sites and surrounding tissue  - Provide patient and family education  - Perform skin care/dressing changes   Outcome: Progressing  Goal: Pressure  injury heals and does not worsen  Description: Interventions:  - Implement low air loss mattress or specialty surface (Criteria met)  - Apply silicone foam dressing  - Instruct/assist with weight shifting every  minutes when in chair   - Limit chair time to  hour intervals  - Use special pressure reducing interventions such as  when in chair   - Apply fecal or urinary incontinence containment device   - Perform passive or active ROM   - Turn and reposition patient & offload bony prominences every 2 hours   - Utilize friction reducing device or surface for transfers   - Consider consults to  interdisciplinary teams   - Use incontinent care products after each incontinent episode   - Consider nutrition services referral as needed  Outcome: Progressing     Problem: HEMATOLOGIC - ADULT  Goal: Maintains hematologic stability  Description: INTERVENTIONS  - Assess for signs and symptoms of bleeding or hemorrhage  - Monitor labs  - Administer supportive blood products/factors as ordered and appropriate  Outcome: Progressing     Problem: MUSCULOSKELETAL - ADULT  Goal: Maintain or return mobility to safest level of function  Description: INTERVENTIONS:  - Assess patient's ability to carry out ADLs; assess patient's baseline for ADL function and identify physical deficits which impact ability to perform ADLs (bathing, care of mouth/teeth, toileting, grooming, dressing, etc.)  - Assess/evaluate cause of self-care deficits   - Assess range of motion  - Assess patient's mobility  - Assess patient's need for assistive devices and provide as appropriate  - Encourage maximum independence but intervene and supervise when necessary  - Involve family in performance of ADLs  - Assess for home care needs following discharge   - Consider OT consult to assist with ADL evaluation and planning for discharge  - Provide patient education as appropriate  Outcome: Progressing  Goal: Maintain proper alignment of affected body part  Description:  INTERVENTIONS:  - Support, maintain and protect limb and body alignment  - Provide patient/ family with appropriate education  Outcome: Progressing     Problem: Nutrition/Hydration-ADULT  Goal: Nutrient/Hydration intake appropriate for improving, restoring or maintaining nutritional needs  Description: Monitor and assess patient's nutrition/hydration status for malnutrition. Collaborate with interdisciplinary team and initiate plan and interventions as ordered.  Monitor patient's weight and dietary intake as ordered or per policy. Utilize nutrition screening tool and intervene as necessary. Determine patient's food preferences and provide high-protein, high-caloric foods as appropriate.     INTERVENTIONS:  - Monitor oral intake, urinary output, labs, and treatment plans  - Assess nutrition and hydration status and recommend course of action  - Evaluate amount of meals eaten  - Assist patient with eating if necessary   - Allow adequate time for meals  - Recommend/ encourage appropriate diets, oral nutritional supplements, and vitamin/mineral supplements  - Order, calculate, and assess calorie counts as needed  - Recommend, monitor, and adjust tube feedings and TPN/PPN based on assessed needs  - Assess need for intravenous fluids  - Provide specific nutrition/hydration education as appropriate  - Include patient/family/caregiver in decisions related to nutrition  Outcome: Progressing     Problem: Prexisting or High Potential for Compromised Skin Integrity  Goal: Skin integrity is maintained or improved  Description: INTERVENTIONS:  - Identify patients at risk for skin breakdown  - Assess and monitor skin integrity  - Assess and monitor nutrition and hydration status  - Monitor labs   - Assess for incontinence   - Turn and reposition patient  - Assist with mobility/ambulation  - Relieve pressure over bony prominences  - Avoid friction and shearing  - Provide appropriate hygiene as needed including keeping skin clean  and dry  - Evaluate need for skin moisturizer/barrier cream  - Collaborate with interdisciplinary team   - Patient/family teaching  - Consider wound care consult   Outcome: Progressing

## 2024-11-16 NOTE — PROGRESS NOTES
Progress Note - Critical Care/ICU   Name: Adelina Soto 64 y.o. female I MRN: [id number]  Unit/Bed#: MICU 01 I Date of Admission: 11/12/2024   Date of Service: 11/16/2024 I Hospital Day: 4       Assessment & Plan   Neuro:   Diagnosis: Status epilepticus/Abnormal MRI  11/12 MRI-Mild restricted diffusion with abnormal FLAIR signal in the mesial left temporal lobe could be postictal. Recommend correlation with CSF to exclude infection such as HSV.   Abnormal signal in the right thalamus of uncertain etiology.   11/13 vEEG L frontal seizures  CSF MEP neg, Protein 74  Cx- negative   Meningitis/Encepalitis-negative  HSV-negative  Paraneoplasic/West Nile/Autoimmune pending  Plan:   Neuro/ID following  Cont antibx as below  Seizure precautions  Cont Keppra/Depacon/Dilantin  Versed gtt 30mg/hr-on hold    Diagnosis: Depression  Plan: Home zoloft    CV:   Diagnosis: Shock 2/2 sedation/burst suppression/Elevated trop/Hx HTN/HLD  Trop peak 1239  Plan:   Echo 60% normal systolic function, moderately increased wall thickness, moderate concentric hypertrophy  Cont levo/vaso for MAP >65  Maintain art line/tlc  Holding home norvasc/lasix/Lopressor  Home statin    Diagnosis: VTach/Afib on coumadin  Plan:  Amio gtt-off  Heparin gtt eventual transition back to coumadin  Pulm:  Diagnosis: acute hypoxic respiratory failure  11/14 CXR pulm vasc congestion  S/p bronch 11/12 1+GNR/1+GPC in clusters  Plan:   AC/VC 22/420/8/60 wean for sats >92%  No SAT/SBT with burst supression  IV gtts double concentrated. Consider IV diuretics PRN    GI:   Diagnosis: GERD  Plan: home PPI    :   Diagnosis: CKD  Baseline creat 1.4-2  Plan: Trend creatinine  I's and O's  peace while sedation    F/E/N:   No active issues    Heme/Onc:   Diagnosis: Anemia  Baseline prior 7-8 now 10  Plan: cbc in am    Endo:   Diagnosis: Hypothyroid  TSH 0.295 Ft4 1.23  Plan:   Consider decreasing synthroid to 75mcg with repeat TSH reflex in 4-6wks    ID:   Diagnosis:  SIRS  11/12 BC NG  11/12 CSF as above  11/12 UC Ecoli/Proteus ESBL  11/12 Bronch 1+GNR/1+GPC in clusters  Plan:   ID following  Acyclovir D3 pending repeat csf studies repeat LP, repeat MRI  UTI for proteus esbl  Zosyn x 3 days per ID    MSK/Skin:   Diagnosis: wounds  Plan: wound care cx    Disposition: Critical care    ICU Core Measures     Vented Patient  VAP Bundle  VAP bundle ordered     A: Assess, Prevent, and Manage Pain Has pain been assessed? Yes  Need for changes to pain regimen? No   B: Both Spontaneous Awakening Trials (SATs) and Spontaneous Breathing Trials (SBTs) Plan to perform spontaneous awakening trial today? No secondary to ongoing seizures/status epilepticus  Plan to perform spontaneous breathing trial today? No secondary to ongoing seizures/status epilepticus  Obvious barriers to extubation? Yes   C: Choice of Sedation RASS Goal: -1 Drowsy or 0 Alert and Calm  Need for changes to sedation or analgesia regimen? No   D: Delirium CAM-ICU: Unable to perform secondary to Acute cognitive dysfunction   E: Early Mobility  Plan for early mobility? Yes   F: Family Engagement Plan for family engagement today? Yes       Antibiotic Review: Patient on appropriate coverage based on culture data.  and Infectious disease consulted    Review of Invasive Devices:    Stringer Plan: Continue for accurate I/O monitoring for 48 hours  Central access plan: Medications requiring central line  [place] Plan: Keep arterial line for hemodynamic monitoring, frequent ABGs, and frequent labs    Prophylaxis:  VTE VTE covered by:  heparin (porcine), Intravenous, 10.1 Units/kg/hr at 11/15/24 2045       Stress Ulcer  covered byfamotidine (PEPCID) 20 mg tablet [[id number]] (Long-Term Med), omeprazole (PRILOSEC) suspension 2 mg/mL [[id number]], pantoprazole (PROTONIX) 40 mg tablet [[id number]] (Long-Term Med)         24 Hour Events : No events overnight reported  Subjective   Review of Systems: Review of Systems not obtainable due to  Clinical Condition    Objective :                   Vitals I/O      Most Recent Min/Max in 24hrs   Temp 98.4 °F (36.9 °C) Temp  Min: 98.4 °F (36.9 °C)  Max: 101.5 °F (38.6 °C)   Pulse 83 Pulse  Min: 83  Max: 114   Resp 22 Resp  Min: 17  Max: 22   /67 BP  Min: 76/59  Max: 121/60   O2 Sat 99 % SpO2  Min: 86 %  Max: 100 %      Intake/Output Summary (Last 24 hours) at 2024 0714  Last data filed at 2024 0600  Gross per 24 hour   Intake 4726.41 ml   Output 1160 ml   Net 3566.41 ml       Diet Enteral/Parenteral; Tube Feeding No Oral Diet; Vital AF 1.2; Continuous; 55    Invasive Monitoring           Physical Exam   Physical Exam  Vitals reviewed.   Eyes:      Conjunctiva/sclera: Conjunctivae normal.      Pupils: Pupils are equal, round, and reactive to light.      Comments: 3 to 4millimeter bilaterally   Skin:     General: Skin is warm and dry.      Capillary Refill: Capillary refill takes 2 to 3 seconds.      Coloration: Skin is pale. Skin is not jaundiced.      Findings: No lesion or rash.   HENT:      Head: Normocephalic and atraumatic.   Cardiovascular:      Rate and Rhythm: Normal rate and regular rhythm.      Pulses: No decreased pulses.      Heart sounds: Normal heart sounds.   Musculoskeletal:      Right lower le+ Edema present.      Left lower le+ Edema present.   Abdominal: General: Bowel sounds are normal. There is no distension.      Palpations: Abdomen is soft.   Constitutional:       General: She is not in acute distress.     Appearance: She is morbidly obese.      Interventions: She is sedated, intubated and restrained.   Pulmonary:      Effort: No respiratory distress. She is intubated.      Breath sounds: Normal breath sounds. No stridor. No wheezing.   Neurological:      Mental Status: She is unresponsive.   Genitourinary/Anorectal:  Stringer present.        Diagnostic Studies        Lab Results: I have reviewed the following results:     Medications:  Scheduled PRN   acyclovir, 10  mg/kg (Adjusted), Q12H  atorvastatin, 10 mg, Daily With Dinner  chlorhexidine, 15 mL, Q12H LINO  insulin lispro, 2-12 Units, Q6H LINO  levalbuterol, 1.25 mg, TID  levETIRAcetam, 1,000 mg, Q12H LINO  levothyroxine, 100 mcg, Early Morning  magnesium sulfate, 2 g, Once  EARLINE ANTIFUNGAL, , BID  omeprazole (PRILOSEC) suspension 2 mg/mL, 20 mg, Early Morning  phenytoin, 100 mg, Q8H LINO  piperacillin-tazobactam, 4.5 g, Q8H  senna-docusate sodium, 2 tablet, BID  sertraline, 50 mg, Daily  [START ON 11/17/2024] thiamine, 200 mg, Daily  thiamine, 500 mg, Daily  [START ON 11/20/2024] thiamine, 100 mg, Daily  valproate sodium, 250 mg, Q6H LINO      acetaminophen, 1,000 mg, Q6H PRN  albuterol, 2.5 mg, Q6H PRN  fentaNYL, 50 mcg, Q1H PRN  LORazepam, 2 mg, Q4H PRN  ondansetron, 4 mg, Q4H PRN       Continuous    heparin (porcine), 3-20 Units/kg/hr (Order-Specific), Last Rate: 10.1 Units/kg/hr (11/15/24 2045)  midazolam, 3 mg/hr, Last Rate: Stopped (11/16/24 0513)  norepinephrine, 1-30 mcg/min, Last Rate: 10 mcg/min (11/16/24 0628)  vasopressin, 0.04 Units/min, Last Rate: 0.04 Units/min (11/16/24 0059)         Labs:   CBC    Recent Labs     11/15/24  0524 11/16/24  0503   WBC 11.81* 10.83*   HGB 10.5* 10.2*   HCT 34.3* 33.4*    208     BMP    Recent Labs     11/15/24  0524 11/16/24  0503   SODIUM 138 137   K 3.8 3.8    110*   CO2 22 20*   AGAP 8 7   BUN 14 15   CREATININE 1.46* 1.47*   CALCIUM 7.4* 7.3*       Coags    Recent Labs     11/15/24  0524 11/16/24  0503   PTT 71* 76*        Additional Electrolytes  Recent Labs     11/15/24  0524 11/16/24  0503   MG 2.3 1.9   PHOS 2.3 3.3          Blood Gas    No recent results  Recent Labs     11/14/24  0754   PHVEN 7.373   TFT4QIM 41.4*   PO2VEN 42.1   KPF1EMH 23.6*   BEVEN -1.6   O3YGVIR 75.3    LFTs  Recent Labs     11/15/24  0524 11/16/24  0503   ALT 6* 6*   AST 16 17   ALKPHOS 95 92   ALB 2.5* 2.3*   TBILI 0.60 0.55       Infectious  No recent results  Glucose  Recent Labs      11/15/24  0524 11/16/24  0503   GLUC 152* 172*        Franco Craig MD  PGY-1 Internal Medicine

## 2024-11-16 NOTE — ASSESSMENT & PLAN NOTE
UA with pyuria and urine culture growing E. coli and ESBL Proteus.  No symptoms of infection prior to admission.  Unlikely cause of status epilepticus however in absence of clear cause of seizure activity and while awaiting CT imaging will continue treatment   -Continue Zosyn for now   -If no signs of pyelonephritis on imaging, treat x 3 days which would be sufficient for cystitis

## 2024-11-16 NOTE — RESPIRATORY THERAPY NOTE
RT Ventilator Management Note  Adelina Soto 64 y.o. female MRN: [id number]  [facility]: [id number]      Daily Screen         11/14/2024  0700 11/15/2024  0702          Patient safety screen outcome:: Failed Failed      Not Ready for Weaning due to:: Underline problem not resolved Underline problem not resolved                Physical Exam:   Assessment Type: (P) Assess only  General Appearance: (P) Sedated  Respiratory Pattern: (P) Assisted  Chest Assessment: (P) Chest expansion symmetrical  Bilateral Breath Sounds: (P) Diminished, Coarse  O2 Device: (P) vent      Resp Comments: (P) Pt has been stable all night on current CMV settings. No changes made at this time. Will cont to monitor pt per protocol

## 2024-11-17 ENCOUNTER — APPOINTMENT (INPATIENT)
Dept: NEUROLOGY | Facility: CLINIC | Age: 64
DRG: 097 | End: 2024-11-17
Payer: COMMERCIAL

## 2024-11-17 LAB
ALBUMIN SERPL BCG-MCNC: 2.4 G/DL (ref 3.5–5)
ALP SERPL-CCNC: 96 U/L (ref 34–104)
ALT SERPL W P-5'-P-CCNC: 6 U/L (ref 7–52)
ANION GAP SERPL CALCULATED.3IONS-SCNC: 8 MMOL/L (ref 4–13)
ANISOCYTOSIS BLD QL SMEAR: PRESENT
APTT PPP: 85 SECONDS (ref 23–34)
AST SERPL W P-5'-P-CCNC: 15 U/L (ref 13–39)
ATRIAL RATE: 105 BPM
ATRIAL RATE: 105 BPM
ATRIAL RATE: 114 BPM
ATRIAL RATE: 98 BPM
BASE EXCESS BLDA CALC-SCNC: -2.4 MMOL/L
BASOPHILS # BLD MANUAL: 0 THOUSAND/UL (ref 0–0.1)
BASOPHILS NFR MAR MANUAL: 0 % (ref 0–1)
BILIRUB SERPL-MCNC: 0.39 MG/DL (ref 0.2–1)
BUN SERPL-MCNC: 16 MG/DL (ref 5–25)
CALCIUM ALBUM COR SERPL-MCNC: 9.1 MG/DL (ref 8.3–10.1)
CALCIUM SERPL-MCNC: 7.8 MG/DL (ref 8.4–10.2)
CHLORIDE SERPL-SCNC: 108 MMOL/L (ref 96–108)
CO2 SERPL-SCNC: 24 MMOL/L (ref 21–32)
CREAT SERPL-MCNC: 1.48 MG/DL (ref 0.6–1.3)
EOSINOPHIL # BLD MANUAL: 0.08 THOUSAND/UL (ref 0–0.4)
EOSINOPHIL NFR BLD MANUAL: 1 % (ref 0–6)
ERYTHROCYTE [DISTWIDTH] IN BLOOD BY AUTOMATED COUNT: 16.7 % (ref 11.6–15.1)
FERRITIN SERPL-MCNC: 188 NG/ML (ref 11–307)
GFR SERPL CREATININE-BSD FRML MDRD: 36 ML/MIN/1.73SQ M
GLUCOSE SERPL-MCNC: 125 MG/DL (ref 65–140)
GLUCOSE SERPL-MCNC: 125 MG/DL (ref 65–140)
GLUCOSE SERPL-MCNC: 129 MG/DL (ref 65–140)
GLUCOSE SERPL-MCNC: 133 MG/DL (ref 65–140)
GLUCOSE SERPL-MCNC: 134 MG/DL (ref 65–140)
GLUCOSE SERPL-MCNC: 139 MG/DL (ref 65–140)
HCO3 BLDA-SCNC: 24.4 MMOL/L (ref 22–28)
HCT VFR BLD AUTO: 33.6 % (ref 34.8–46.1)
HGB BLD-MCNC: 9.8 G/DL (ref 11.5–15.4)
IRON SATN MFR SERPL: 29 % (ref 15–50)
IRON SERPL-MCNC: 37 UG/DL (ref 50–212)
LYMPHOCYTES # BLD AUTO: 0.72 THOUSAND/UL (ref 0.6–4.47)
LYMPHOCYTES # BLD AUTO: 6 % (ref 14–44)
MACROCYTES BLD QL AUTO: PRESENT
MAGNESIUM SERPL-MCNC: 1.9 MG/DL (ref 1.9–2.7)
MCH RBC QN AUTO: 30.4 PG (ref 26.8–34.3)
MCHC RBC AUTO-ENTMCNC: 29.2 G/DL (ref 31.4–37.4)
MCV RBC AUTO: 104 FL (ref 82–98)
MONOCYTES # BLD AUTO: 0.4 THOUSAND/UL (ref 0–1.22)
MONOCYTES NFR BLD: 5 % (ref 4–12)
NEUTROPHILS # BLD MANUAL: 6.79 THOUSAND/UL (ref 1.85–7.62)
NEUTS BAND NFR BLD MANUAL: 5 % (ref 0–8)
NEUTS SEG NFR BLD AUTO: 80 % (ref 43–75)
O2 CT BLDA-SCNC: 22 ML/DL (ref 16–23)
OXYHGB MFR BLDA: 96.9 % (ref 94–97)
PCO2 BLDA: 49.4 MM HG (ref 36–44)
PH BLDA: 7.31 [PH] (ref 7.35–7.45)
PHENYTOIN SERPL-MCNC: 10.6 UG/ML (ref 10–20)
PHOSPHATE SERPL-MCNC: 3.5 MG/DL (ref 2.3–4.1)
PLATELET # BLD AUTO: 172 THOUSANDS/UL (ref 149–390)
PLATELET BLD QL SMEAR: ADEQUATE
PMV BLD AUTO: 10.1 FL (ref 8.9–12.7)
PO2 BLDA: 114 MM HG (ref 75–129)
POLYCHROMASIA BLD QL SMEAR: PRESENT
POTASSIUM SERPL-SCNC: 4.2 MMOL/L (ref 3.5–5.3)
PR INTERVAL: 0 MS
PROT SERPL-MCNC: 5.5 G/DL (ref 6.4–8.4)
QRS AXIS: -29 DEGREES
QRS AXIS: -76 DEGREES
QRS AXIS: -79 DEGREES
QRS AXIS: 137 DEGREES
QRSD INTERVAL: 79 MS
QRSD INTERVAL: 83 MS
QT INTERVAL: 392 MS
QT INTERVAL: 392 MS
QT INTERVAL: 396 MS
QT INTERVAL: 413 MS
QTC INTERVAL: 519 MS
QTC INTERVAL: 519 MS
QTC INTERVAL: 520 MS
QTC INTERVAL: 529 MS
RBC # BLD AUTO: 3.22 MILLION/UL (ref 3.81–5.12)
RBC MORPH BLD: PRESENT
SODIUM SERPL-SCNC: 140 MMOL/L (ref 135–147)
SPECIMEN SOURCE: ABNORMAL
T WAVE AXIS: 37 DEGREES
T WAVE AXIS: 52 DEGREES
T WAVE AXIS: 72 DEGREES
T WAVE AXIS: 92 DEGREES
TIBC SERPL-MCNC: 126 UG/DL (ref 250–450)
UIBC SERPL-MCNC: 89 UG/DL (ref 155–355)
VALPROATE SERPL-MCNC: 50 UG/ML (ref 50–100)
VALPROATE SERPL-MCNC: 71 UG/ML (ref 50–100)
VARIANT LYMPHS # BLD AUTO: 3 %
VENTRICULAR RATE: 105 BPM
VENTRICULAR RATE: 105 BPM
VENTRICULAR RATE: 107 BPM
VENTRICULAR RATE: 95 BPM
WBC # BLD AUTO: 7.99 THOUSAND/UL (ref 4.31–10.16)

## 2024-11-17 PROCEDURE — 94668 MNPJ CHEST WALL SBSQ: CPT

## 2024-11-17 PROCEDURE — 94664 DEMO&/EVAL PT USE INHALER: CPT

## 2024-11-17 PROCEDURE — 82728 ASSAY OF FERRITIN: CPT

## 2024-11-17 PROCEDURE — 80164 ASSAY DIPROPYLACETIC ACD TOT: CPT

## 2024-11-17 PROCEDURE — 94760 N-INVAS EAR/PLS OXIMETRY 1: CPT

## 2024-11-17 PROCEDURE — 99233 SBSQ HOSP IP/OBS HIGH 50: CPT | Performed by: INTERNAL MEDICINE

## 2024-11-17 PROCEDURE — 94003 VENT MGMT INPAT SUBQ DAY: CPT

## 2024-11-17 PROCEDURE — 95720 EEG PHY/QHP EA INCR W/VEEG: CPT | Performed by: PSYCHIATRY & NEUROLOGY

## 2024-11-17 PROCEDURE — 83540 ASSAY OF IRON: CPT

## 2024-11-17 PROCEDURE — 85730 THROMBOPLASTIN TIME PARTIAL: CPT | Performed by: PSYCHIATRY & NEUROLOGY

## 2024-11-17 PROCEDURE — 83550 IRON BINDING TEST: CPT

## 2024-11-17 PROCEDURE — 82948 REAGENT STRIP/BLOOD GLUCOSE: CPT

## 2024-11-17 PROCEDURE — 99291 CRITICAL CARE FIRST HOUR: CPT | Performed by: INTERNAL MEDICINE

## 2024-11-17 PROCEDURE — 85007 BL SMEAR W/DIFF WBC COUNT: CPT | Performed by: STUDENT IN AN ORGANIZED HEALTH CARE EDUCATION/TRAINING PROGRAM

## 2024-11-17 PROCEDURE — 85027 COMPLETE CBC AUTOMATED: CPT | Performed by: STUDENT IN AN ORGANIZED HEALTH CARE EDUCATION/TRAINING PROGRAM

## 2024-11-17 PROCEDURE — 99233 SBSQ HOSP IP/OBS HIGH 50: CPT | Performed by: PSYCHIATRY & NEUROLOGY

## 2024-11-17 PROCEDURE — 84100 ASSAY OF PHOSPHORUS: CPT

## 2024-11-17 PROCEDURE — 82805 BLOOD GASES W/O2 SATURATION: CPT

## 2024-11-17 PROCEDURE — 80185 ASSAY OF PHENYTOIN TOTAL: CPT

## 2024-11-17 PROCEDURE — 80053 COMPREHEN METABOLIC PANEL: CPT | Performed by: STUDENT IN AN ORGANIZED HEALTH CARE EDUCATION/TRAINING PROGRAM

## 2024-11-17 PROCEDURE — 80186 ASSAY OF PHENYTOIN FREE: CPT

## 2024-11-17 PROCEDURE — 94640 AIRWAY INHALATION TREATMENT: CPT

## 2024-11-17 PROCEDURE — 95715 VEEG EA 12-26HR INTMT MNTR: CPT

## 2024-11-17 PROCEDURE — 93005 ELECTROCARDIOGRAM TRACING: CPT

## 2024-11-17 PROCEDURE — 93010 ELECTROCARDIOGRAM REPORT: CPT | Performed by: INTERNAL MEDICINE

## 2024-11-17 PROCEDURE — 83735 ASSAY OF MAGNESIUM: CPT

## 2024-11-17 RX ORDER — HEPARIN SODIUM 10000 [USP'U]/100ML
3-20 INJECTION, SOLUTION INTRAVENOUS
Status: DISCONTINUED | OUTPATIENT
Start: 2024-11-17 | End: 2024-11-17

## 2024-11-17 RX ORDER — FAMOTIDINE 20 MG/1
20 TABLET, FILM COATED ORAL DAILY
Status: DISCONTINUED | OUTPATIENT
Start: 2024-11-17 | End: 2024-11-30

## 2024-11-17 RX ORDER — HEPARIN SODIUM 10000 [USP'U]/100ML
3-20 INJECTION, SOLUTION INTRAVENOUS
Status: DISCONTINUED | OUTPATIENT
Start: 2024-11-17 | End: 2024-11-18

## 2024-11-17 RX ORDER — PHENYTOIN SODIUM 50 MG/ML
100 INJECTION INTRAMUSCULAR; INTRAVENOUS EVERY 8 HOURS
Status: DISCONTINUED | OUTPATIENT
Start: 2024-11-17 | End: 2024-11-18

## 2024-11-17 RX ORDER — FUROSEMIDE 10 MG/ML
40 INJECTION INTRAMUSCULAR; INTRAVENOUS
Status: DISCONTINUED | OUTPATIENT
Start: 2024-11-17 | End: 2024-11-19

## 2024-11-17 RX ORDER — MAGNESIUM SULFATE HEPTAHYDRATE 40 MG/ML
2 INJECTION, SOLUTION INTRAVENOUS ONCE
Status: DISCONTINUED | OUTPATIENT
Start: 2024-11-17 | End: 2024-11-17

## 2024-11-17 RX ADMIN — CHLORHEXIDINE GLUCONATE 0.12% ORAL RINSE 15 ML: 1.2 LIQUID ORAL at 08:46

## 2024-11-17 RX ADMIN — ACYCLOVIR SODIUM 775 MG: 50 INJECTION, SOLUTION INTRAVENOUS at 08:45

## 2024-11-17 RX ADMIN — VALPROATE SODIUM 375 MG: 100 INJECTION, SOLUTION INTRAVENOUS at 09:00

## 2024-11-17 RX ADMIN — PHENYTOIN SODIUM 100 MG: 50 INJECTION INTRAMUSCULAR; INTRAVENOUS at 15:20

## 2024-11-17 RX ADMIN — Medication 12.5 MG: at 12:52

## 2024-11-17 RX ADMIN — VALPROATE SODIUM 375 MG: 100 INJECTION, SOLUTION INTRAVENOUS at 03:59

## 2024-11-17 RX ADMIN — NOREPINEPHRINE BITARTRATE 2 MCG/MIN: 1 INJECTION, SOLUTION, CONCENTRATE INTRAVENOUS at 02:31

## 2024-11-17 RX ADMIN — LEVALBUTEROL HYDROCHLORIDE 1.25 MG: 1.25 SOLUTION RESPIRATORY (INHALATION) at 08:06

## 2024-11-17 RX ADMIN — LEVALBUTEROL HYDROCHLORIDE 1.25 MG: 1.25 SOLUTION RESPIRATORY (INHALATION) at 19:15

## 2024-11-17 RX ADMIN — PHENYTOIN 100 MG: 125 SUSPENSION ORAL at 02:20

## 2024-11-17 RX ADMIN — MICONAZOLE NITRATE: 20 CREAM TOPICAL at 18:02

## 2024-11-17 RX ADMIN — LEVOTHYROXINE SODIUM 100 MCG: 100 TABLET ORAL at 05:10

## 2024-11-17 RX ADMIN — VASOPRESSIN 0.04 UNITS/MIN: 20 INJECTION INTRAVENOUS at 20:06

## 2024-11-17 RX ADMIN — ATORVASTATIN CALCIUM 10 MG: 10 TABLET, FILM COATED ORAL at 18:02

## 2024-11-17 RX ADMIN — PIPERACILLIN SODIUM AND TAZOBACTAM SODIUM 4.5 G: 36; 4.5 INJECTION, POWDER, LYOPHILIZED, FOR SOLUTION INTRAVENOUS at 19:57

## 2024-11-17 RX ADMIN — PHENYTOIN SODIUM 100 MG: 50 INJECTION INTRAMUSCULAR; INTRAVENOUS at 23:12

## 2024-11-17 RX ADMIN — LEVETIRACETAM 1000 MG: 100 INJECTION, SOLUTION INTRAVENOUS at 08:46

## 2024-11-17 RX ADMIN — MICONAZOLE NITRATE: 20 CREAM TOPICAL at 08:59

## 2024-11-17 RX ADMIN — VASOPRESSIN 0.04 UNITS/MIN: 20 INJECTION INTRAVENOUS at 10:00

## 2024-11-17 RX ADMIN — CHLORHEXIDINE GLUCONATE 0.12% ORAL RINSE 15 ML: 1.2 LIQUID ORAL at 20:15

## 2024-11-17 RX ADMIN — LEVETIRACETAM 1000 MG: 100 INJECTION, SOLUTION INTRAVENOUS at 20:15

## 2024-11-17 RX ADMIN — VASOPRESSIN 0.04 UNITS/MIN: 20 INJECTION INTRAVENOUS at 02:09

## 2024-11-17 RX ADMIN — ACYCLOVIR SODIUM 775 MG: 50 INJECTION, SOLUTION INTRAVENOUS at 20:00

## 2024-11-17 RX ADMIN — HEPARIN SODIUM 10.1 UNITS/KG/HR: 10000 INJECTION, SOLUTION INTRAVENOUS at 16:48

## 2024-11-17 RX ADMIN — FUROSEMIDE 40 MG: 10 INJECTION, SOLUTION INTRAVENOUS at 18:02

## 2024-11-17 RX ADMIN — FUROSEMIDE 40 MG: 10 INJECTION, SOLUTION INTRAVENOUS at 12:52

## 2024-11-17 RX ADMIN — PIPERACILLIN SODIUM AND TAZOBACTAM SODIUM 4.5 G: 36; 4.5 INJECTION, POWDER, LYOPHILIZED, FOR SOLUTION INTRAVENOUS at 03:57

## 2024-11-17 RX ADMIN — SERTRALINE HYDROCHLORIDE 50 MG: 50 TABLET ORAL at 08:47

## 2024-11-17 RX ADMIN — Medication 12.5 MG: at 23:19

## 2024-11-17 RX ADMIN — FAMOTIDINE 20 MG: 20 TABLET, FILM COATED ORAL at 08:46

## 2024-11-17 RX ADMIN — FENTANYL CITRATE 50 MCG: 50 INJECTION INTRAMUSCULAR; INTRAVENOUS at 05:10

## 2024-11-17 RX ADMIN — VALPROATE SODIUM 375 MG: 100 INJECTION, SOLUTION INTRAVENOUS at 16:48

## 2024-11-17 RX ADMIN — LEVALBUTEROL HYDROCHLORIDE 1.25 MG: 1.25 SOLUTION RESPIRATORY (INHALATION) at 14:59

## 2024-11-17 RX ADMIN — THIAMINE HYDROCHLORIDE 200 MG: 100 INJECTION, SOLUTION INTRAMUSCULAR; INTRAVENOUS at 08:46

## 2024-11-17 RX ADMIN — PIPERACILLIN SODIUM AND TAZOBACTAM SODIUM 4.5 G: 36; 4.5 INJECTION, POWDER, LYOPHILIZED, FOR SOLUTION INTRAVENOUS at 12:52

## 2024-11-17 RX ADMIN — VALPROATE SODIUM 375 MG: 100 INJECTION, SOLUTION INTRAVENOUS at 22:48

## 2024-11-17 RX ADMIN — Medication 20 MG: at 05:13

## 2024-11-17 NOTE — PROGRESS NOTES
Progress Note - Neurology   Name: Adelina Soto 64 y.o. female I MRN: [id number]  Unit/Bed#: MICU 01 I Date of Admission: 11/12/2024   Date of Service: 11/17/2024 I Hospital Day: 5    Assessment & Plan  Seizure (HCC)  Adelina Soto is a 64 year old woman with PMHx including HTN, HLD, hypothyroidism, A fib on Warfarin, DM, GERD, anxiety who presented to the hospital for evaluation of altered mental status. Stroke alert called due to gaze deviation and altered mentation. Pt was noted to have two witnessed seizures prior to arrival, and received 6 mg Versed prior to arrival. Continued to have left gaze preference for ED, with lack of command following although was moving her LUE spontaneously. CT head and CTA H/N with no findings that could explain her presentation. After return from CT scan, appeared to have some improvement in her exam but still off from her baseline on discussion with sister at bedside. Concern for status epilepticus with multiple seizures without return to baseline as of yet. Otherwise compliant with Coumadin, with INR 2.42.     VEEG monitoring report of 08:00 11/13: 2:20 this morning and around 02:40 started to have repetitive left frontal seizures, every 10 seconds at times. She was started on sedation again shortly after 03:00 and seizures stopped at that point. EEG is now very low amplitude and slow, with frequent left temporal sharps.     VEEG 24 hrs AM 11:17: EEG is still discontinuous and mostly polymorphic delta activities. Still with some left frontal and left temporal sharps, but no seizures.    MRI: Limited by motion. Mild restricted diffusion with abnormal FLAIR signal in the mesial left temporal lobe could be postictal. Recommend correlation with CSF to exclude infection such as HSV. Abnormal signal in the right thalamus of uncertain etiology.     Impression: status epilepticus, which required multiple AED & sedation medications to control in setting of no history of seizures &  currently unknown etiology       Plan:   - Follow trough levels of pheny & valp  - Await final CSF results  - maintenance phenytoin 100 mg q8h   - maintenance  mg q6h  - Seizure precautions  - Will need DMV report  - Urine cx ESBL MDRO, zosyn begun  - Goal of normothermia and euglycemia  - Therapy when able  - Continue home Warfarin  - Systolic BP goal less than 180  - Continue to monitor closely for changes in examination, notify Neurology if so  - Rest of care per primary  - Seizure precautions outpatient: no driving, no operating heavy machinery, no swimming alone, no climbing, no baths only showers, no cooking alone, or any activity that would put them at increased risk of harm if they were to have a seizure.    Recommendations for outpatient neurological follow up have yet to be determined.  I have discussed with Dr. Bazan the above plan to treat pt. He agrees with the plan.  Please contact the SecureChat role for the Neurology service with any questions/concerns.    Subjective   Intubated    Review of Systems   Unable to perform ROS: Intubated         Objective :  Temp:  [93.7 °F (34.3 °C)-99 °F (37.2 °C)] 99 °F (37.2 °C)  HR:  [] 111  Resp:  [16-22] 16  SpO2:  [91 %-99 %] 95 %  O2 Device: Ventilator  FiO2 (%):  [50] 50    Physical Exam  Vitals and nursing note reviewed. Exam conducted with a chaperone present.   Constitutional:       General: She is not in acute distress.     Appearance: She is not ill-appearing, toxic-appearing or diaphoretic.   HENT:      Head: Normocephalic.      Right Ear: External ear normal.      Left Ear: External ear normal.      Nose: Nose normal.   Eyes:      General: No scleral icterus.        Right eye: No discharge.         Left eye: No discharge.      Conjunctiva/sclera: Conjunctivae normal.     Neurological Exam  Mental Status   Patient is nonverbal.  Unresponsive not on sedation.    Cranial Nerves  Intubated.    Motor  Normal muscle bulk throughout. No  fasciculations present. Normal muscle tone. No abnormal involuntary movements.  No movement to pain .    Sensory  Intubated.    Reflexes  Deep tendon reflexes are 2+ and symmetric except as noted.    Coordination    Intubated.    Gait    Intubated.        Lab Results: I have reviewed the following results:  Other Study Results Review: Other studies reviewed include: valproate & phenytoin levels    VTE Pharmacologic Prophylaxis: VTE covered by:  heparin (porcine), Intravenous       Administrative Statements   I have spent a total time of 50 minutes in caring for this patient on the day of the visit/encounter including Counseling / Coordination of care, Documenting in the medical record, Reviewing / ordering tests, medicine, procedures  , Obtaining or reviewing history  , and Communicating with other healthcare professionals .

## 2024-11-17 NOTE — ASSESSMENT & PLAN NOTE
Lab Results   Component Value Date    EGFR 36 11/17/2024    EGFR 37 11/16/2024    EGFR 37 11/15/2024    CREATININE 1.48 (H) 11/17/2024    CREATININE 1.47 (H) 11/16/2024    CREATININE 1.46 (H) 11/15/2024   Baseline creatinine 1.7-1.9.  Creatinine currently at baseline and stable.  Dose adjust antibiotic car wheels as needed for creatinine clearance.  Monitor creatinine daily

## 2024-11-17 NOTE — PLAN OF CARE
Problem: PAIN - ADULT  Goal: Verbalizes/displays adequate comfort level or baseline comfort level  Description: Interventions:  - Encourage patient to monitor pain and request assistance  - Assess pain using appropriate pain scale  - Administer analgesics based on type and severity of pain and evaluate response  - Implement non-pharmacological measures as appropriate and evaluate response  - Consider cultural and social influences on pain and pain management  - Notify physician/advanced practitioner if interventions unsuccessful or patient reports new pain  Outcome: Progressing     Problem: INFECTION - ADULT  Goal: Absence or prevention of progression during hospitalization  Description: INTERVENTIONS:  - Assess and monitor for signs and symptoms of infection  - Monitor lab/diagnostic results  - Monitor all insertion sites, i.e. indwelling lines, tubes, and drains  - Monitor endotracheal if appropriate and nasal secretions for changes in amount and color  - [place] appropriate cooling/warming therapies per order  - Administer medications as ordered  - Instruct and encourage patient and family to use good hand hygiene technique  - Identify and instruct in appropriate isolation precautions for identified infection/condition  Outcome: Progressing  Goal: Absence of fever/infection during neutropenic period  Description: INTERVENTIONS:  - Monitor WBC    Outcome: Progressing     Problem: SAFETY ADULT  Goal: Patient will remain free of falls  Description: INTERVENTIONS:  - Educate patient/family on patient safety including physical limitations  - Instruct patient to call for assistance with activity   - Consult OT/PT to assist with strengthening/mobility   - Keep Call bell within reach  - Keep bed low and locked with side rails adjusted as appropriate  - Keep care items and personal belongings within reach  - Initiate and maintain comfort rounds  - Make Fall Risk Sign visible to staff  - Offer Toileting every  Hours,  in advance of need  - Initiate/Maintain alarm  - Obtain necessary fall risk management equipment:   - Apply yellow socks and bracelet for high fall risk patients  - Consider moving patient to room near nurses station  Outcome: Progressing  Goal: Maintain or return to baseline ADL function  Description: INTERVENTIONS:  -  Assess patient's ability to carry out ADLs; assess patient's baseline for ADL function and identify physical deficits which impact ability to perform ADLs (bathing, care of mouth/teeth, toileting, grooming, dressing, etc.)  - Assess/evaluate cause of self-care deficits   - Assess range of motion  - Assess patient's mobility; develop plan if impaired  - Assess patient's need for assistive devices and provide as appropriate  - Encourage maximum independence but intervene and supervise when necessary  - Involve family in performance of ADLs  - Assess for home care needs following discharge   - Consider OT consult to assist with ADL evaluation and planning for discharge  - Provide patient education as appropriate  Outcome: Progressing  Goal: Maintains/Returns to pre admission functional level  Description: INTERVENTIONS:  - Perform AM-PAC 6 Click Basic Mobility/ Daily Activity assessment daily.  - Set and communicate daily mobility goal to care team and patient/family/caregiver.   - Collaborate with rehabilitation services on mobility goals if consulted  - Perform Range of Motion  times a day.  - Reposition patient every  hours.  - Dangle patient  times a day  - Stand patient  times a day  - Ambulate patient  times a day  - Out of bed to chair  times a day   - Out of bed for meals  times a day  - Out of bed for toileting  - Record patient progress and toleration of activity level   Outcome: Progressing     Problem: DISCHARGE PLANNING  Goal: Discharge to home or other facility with appropriate resources  Description: INTERVENTIONS:  - Identify barriers to discharge w/patient and caregiver  - Arrange for  needed discharge resources and transportation as appropriate  - Identify discharge learning needs (meds, wound care, etc.)  - Arrange for interpretive services to assist at discharge as needed  - Refer to Case Management Department for coordinating discharge planning if the patient needs post-hospital services based on physician/advanced practitioner order or complex needs related to functional status, cognitive ability, or social support system  Outcome: Progressing     Problem: Knowledge Deficit  Goal: Patient/family/caregiver demonstrates understanding of disease process, treatment plan, medications, and discharge instructions  Description: Complete learning assessment and assess knowledge base.  Interventions:  - Provide teaching at level of understanding  - Provide teaching via preferred learning methods  Outcome: Progressing     Problem: NEUROSENSORY - ADULT  Goal: Achieves stable or improved neurological status  Description: INTERVENTIONS  - Monitor and report changes in neurological status  - Monitor vital signs such as temperature, blood pressure, glucose, and any other labs ordered   - Initiate measures to prevent increased intracranial pressure  - Monitor for seizure activity and implement precautions if appropriate      Outcome: Progressing  Goal: Remains free of injury related to seizures activity  Description: INTERVENTIONS  - Maintain airway, patient safety  and administer oxygen as ordered  - Monitor patient for seizure activity, document and report duration and description of seizure to physician/advanced practitioner  - If seizure occurs,  ensure patient safety during seizure  - Reorient patient post seizure  - Seizure pads on all 4 side rails  - Instruct patient/family to notify RN of any seizure activity including if an aura is experienced  - Instruct patient/family to call for assistance with activity based on nursing assessment  - Administer anti-seizure medications if ordered    Outcome:  Progressing  Goal: Achieves maximal functionality and self care  Description: INTERVENTIONS  - Monitor swallowing and airway patency with patient fatigue and changes in neurological status  - Encourage and assist patient to increase activity and self care.   - Encourage visually impaired, hearing impaired and aphasic patients to use assistive/communication devices  Outcome: Progressing     Problem: CARDIOVASCULAR - ADULT  Goal: Maintains optimal cardiac output and hemodynamic stability  Description: INTERVENTIONS:  - Monitor I/O, vital signs and rhythm  - Monitor for S/S and trends of decreased cardiac output  - Administer and titrate ordered vasoactive medications to optimize hemodynamic stability  - Assess quality of pulses, skin color and temperature  - Assess for signs of decreased coronary artery perfusion  - Instruct patient to report change in severity of symptoms  Outcome: Progressing  Goal: Absence of cardiac dysrhythmias or at baseline rhythm  Description: INTERVENTIONS:  - Continuous cardiac monitoring, vital signs, obtain 12 lead EKG if ordered  - Administer antiarrhythmic and heart rate control medications as ordered  - Monitor electrolytes and administer replacement therapy as ordered  Outcome: Progressing     Problem: RESPIRATORY - ADULT  Goal: Achieves optimal ventilation and oxygenation  Description: INTERVENTIONS:  - Assess for changes in respiratory status  - Assess for changes in mentation and behavior  - Position to facilitate oxygenation and minimize respiratory effort  - Oxygen administered by appropriate delivery if ordered  - Initiate smoking cessation education as indicated  - Encourage broncho-pulmonary hygiene including cough, deep breathe, Incentive Spirometry  - Assess the need for suctioning and aspirate as needed  - Assess and instruct to report SOB or any respiratory difficulty  - Respiratory Therapy support as indicated  Outcome: Progressing     Problem: GASTROINTESTINAL -  ADULT  Goal: Minimal or absence of nausea and/or vomiting  Description: INTERVENTIONS:  - Administer IV fluids if ordered to ensure adequate hydration  - Maintain NPO status until nausea and vomiting are resolved  - Nasogastric tube if ordered  - Administer ordered antiemetic medications as needed  - Provide nonpharmacologic comfort measures as appropriate  - Advance diet as tolerated, if ordered  - Consider nutrition services referral to assist patient with adequate nutrition and appropriate food choices  Outcome: Progressing  Goal: Maintains or returns to baseline bowel function  Description: INTERVENTIONS:  - Assess bowel function  - Encourage oral fluids to ensure adequate hydration  - Administer IV fluids if ordered to ensure adequate hydration  - Administer ordered medications as needed  - Encourage mobilization and activity  - Consider nutritional services referral to assist patient with adequate nutrition and appropriate food choices  Outcome: Progressing  Goal: Maintains adequate nutritional intake  Description: INTERVENTIONS:  - Monitor percentage of each meal consumed  - Identify factors contributing to decreased intake, treat as appropriate  - Assist with meals as needed  - Monitor I&O, weight, and lab values if indicated  - Obtain nutrition services referral as needed  Outcome: Progressing  Goal: Establish and maintain optimal ostomy function  Description: INTERVENTIONS:  - Assess bowel function  - Encourage oral fluids to ensure adequate hydration  - Administer IV fluids if ordered to ensure adequate hydration   - Administer ordered medications as needed  - Encourage mobilization and activity  - Nutrition services referral to assist patient with appropriate food choices  - Assess stoma site  - Consider wound care consult   Outcome: Progressing  Goal: Oral mucous membranes remain intact  Description: INTERVENTIONS  - Assess oral mucosa and hygiene practices  - Implement preventative oral hygiene  regimen  - Implement oral medicated treatments as ordered  - Initiate Nutrition services referral as needed  Outcome: Progressing     Problem: GENITOURINARY - ADULT  Goal: Maintains or returns to baseline urinary function  Description: INTERVENTIONS:  - Assess urinary function  - Encourage oral fluids to ensure adequate hydration if ordered  - Administer IV fluids as ordered to ensure adequate hydration  - Administer ordered medications as needed  - Offer frequent toileting  - Follow urinary retention protocol if ordered  Outcome: Progressing  Goal: Absence of urinary retention  Description: INTERVENTIONS:  - Assess patient’s ability to void and empty bladder  - Monitor I/O  - Bladder scan as needed  - Discuss with physician/AP medications to alleviate retention as needed  - Discuss catheterization for long term situations as appropriate  Outcome: Progressing  Goal: Urinary catheter remains patent  Description: INTERVENTIONS:  - Assess patency of urinary catheter  - If patient has a chronic peace, consider changing catheter if non-functioning  - Follow guidelines for intermittent irrigation of non-functioning urinary catheter  Outcome: Progressing     Problem: METABOLIC, FLUID AND ELECTROLYTES - ADULT  Goal: Electrolytes maintained within normal limits  Description: INTERVENTIONS:  - Monitor labs and assess patient for signs and symptoms of electrolyte imbalances  - Administer electrolyte replacement as ordered  - Monitor response to electrolyte replacements, including repeat lab results as appropriate  - Instruct patient on fluid and nutrition as appropriate  Outcome: Progressing  Goal: Fluid balance maintained  Description: INTERVENTIONS:  - Monitor labs   - Monitor I/O and WT  - Instruct patient on fluid and nutrition as appropriate  - Assess for signs & symptoms of volume excess or deficit  Outcome: Progressing  Goal: Glucose maintained within target range  Description: INTERVENTIONS:  - Monitor Blood Glucose as  ordered  - Assess for signs and symptoms of hyperglycemia and hypoglycemia  - Administer ordered medications to maintain glucose within target range  - Assess nutritional intake and initiate nutrition service referral as needed  Outcome: Progressing     Problem: SKIN/TISSUE INTEGRITY - ADULT  Goal: Skin Integrity remains intact(Skin Breakdown Prevention)  Description: Assess:  -Perform [place] assessment every   -Clean and moisturize skin every   -Inspect skin when repositioning, toileting, and assisting with ADLS  -Assess under medical devices such as  every   -Assess extremities for adequate circulation and sensation     Bed Management:  -Have minimal linens on bed & keep smooth, unwrinkled  -Change linens as needed when moist or perspiring  -Avoid sitting or lying in one position for more than  hours while in bed  -Keep HOB at degrees     Toileting:  -Offer bedside commode  -Assess for incontinence every   -Use incontinent care products after each incontinent episode such as     Activity:  -Mobilize patient  times a day  -Encourage activity and walks on unit  -Encourage or provide ROM exercises   -Turn and reposition patient every Hours  -Use appropriate equipment to lift or move patient in bed  -Instruct/ Assist with weight shifting every  when out of bed in chair  -Consider limitation of chair time  hour intervals    Skin Care:  -Avoid use of baby powder, tape, friction and shearing, hot water or constrictive clothing  -Relieve pressure over bony prominences using   -Do not massage red bony areas    Next Steps:  -Teach patient strategies to minimize risks such as    -Consider consults to  interdisciplinary teams such as   Outcome: Progressing  Goal: Incision(s), wounds(s) or drain site(s) healing without S/S of infection  Description: INTERVENTIONS  - Assess and document dressing, incision, wound bed, drain sites and surrounding tissue  - Provide patient and family education  - Perform skin care/dressing changes  every   Outcome: Progressing  Goal: Pressure injury heals and does not worsen  Description: Interventions:  - Implement low air loss mattress or specialty surface (Criteria met)  - Apply silicone foam dressing  - Instruct/assist with weight shifting every  minutes when in chair   - Limit chair time to  hour intervals  - Use special pressure reducing interventions such as  when in chair   - Apply fecal or urinary incontinence containment device   - Perform passive or active ROM every   - Turn and reposition patient & offload bony prominences every  hours   - Utilize friction reducing device or surface for transfers   - Consider consults to  interdisciplinary teams   - Use incontinent care products after each incontinent episode such as   - Consider nutrition services referral as needed  Outcome: Progressing     Problem: HEMATOLOGIC - ADULT  Goal: Maintains hematologic stability  Description: INTERVENTIONS  - Assess for signs and symptoms of bleeding or hemorrhage  - Monitor labs  - Administer supportive blood products/factors as ordered and appropriate  Outcome: Progressing     Problem: MUSCULOSKELETAL - ADULT  Goal: Maintain or return mobility to safest level of function  Description: INTERVENTIONS:  - Assess patient's ability to carry out ADLs; assess patient's baseline for ADL function and identify physical deficits which impact ability to perform ADLs (bathing, care of mouth/teeth, toileting, grooming, dressing, etc.)  - Assess/evaluate cause of self-care deficits   - Assess range of motion  - Assess patient's mobility  - Assess patient's need for assistive devices and provide as appropriate  - Encourage maximum independence but intervene and supervise when necessary  - Involve family in performance of ADLs  - Assess for home care needs following discharge   - Consider OT consult to assist with ADL evaluation and planning for discharge  - Provide patient education as appropriate  Outcome: Progressing  Goal:  Maintain proper alignment of affected body part  Description: INTERVENTIONS:  - Support, maintain and protect limb and body alignment  - Provide patient/ family with appropriate education  Outcome: Progressing     Problem: Nutrition/Hydration-ADULT  Goal: Nutrient/Hydration intake appropriate for improving, restoring or maintaining nutritional needs  Description: Monitor and assess patient's nutrition/hydration status for malnutrition. Collaborate with interdisciplinary team and initiate plan and interventions as ordered.  Monitor patient's weight and dietary intake as ordered or per policy. Utilize nutrition screening tool and intervene as necessary. Determine patient's food preferences and provide high-protein, high-caloric foods as appropriate.     INTERVENTIONS:  - Monitor oral intake, urinary output, labs, and treatment plans  - Assess nutrition and hydration status and recommend course of action  - Evaluate amount of meals eaten  - Assist patient with eating if necessary   - Allow adequate time for meals  - Recommend/ encourage appropriate diets, oral nutritional supplements, and vitamin/mineral supplements  - Order, calculate, and assess calorie counts as needed  - Recommend, monitor, and adjust tube feedings and TPN/PPN based on assessed needs  - Assess need for intravenous fluids  - Provide specific nutrition/hydration education as appropriate  - Include patient/family/caregiver in decisions related to nutrition  Outcome: Progressing     Problem: Prexisting or High Potential for Compromised Skin Integrity  Goal: Skin integrity is maintained or improved  Description: INTERVENTIONS:  - Identify patients at risk for skin breakdown  - Assess and monitor skin integrity  - Assess and monitor nutrition and hydration status  - Monitor labs   - Assess for incontinence   - Turn and reposition patient  - Assist with mobility/ambulation  - Relieve pressure over bony prominences  - Avoid friction and shearing  - Provide  appropriate hygiene as needed including keeping skin clean and dry  - Evaluate need for skin moisturizer/barrier cream  - Collaborate with interdisciplinary team   - Patient/family teaching  - Consider wound care consult   Outcome: Progressing     Problem: SAFETY,RESTRAINT: NV/NON-SELF DESTRUCTIVE BEHAVIOR  Goal: Remains free of harm/injury (restraint for non violent/non self-detsructive behavior)  Description: INTERVENTIONS:  - Instruct patient/family regarding restraint use   - Assess and monitor physiologic and psychological status   - Provide interventions and comfort measures to meet assessed patient needs   - Identify and implement measures to help patient regain control  - Assess readiness for release of restraint   Outcome: Progressing  Goal: Returns to optimal restraint-free functioning  Description: INTERVENTIONS:  - Assess the patient's behavior and symptoms that indicate continued need for restraint  - Identify and implement measures to help patient regain control  - Assess readiness for release of restraint   Outcome: Progressing

## 2024-11-17 NOTE — PROGRESS NOTES
Progress Note - Infectious Disease   Name: Adelina Soto 64 y.o. female I MRN: [id number]  Unit/Bed#: MICU 01 I Date of Admission: 11/12/2024   Date of Service: 11/17/2024 I Hospital Day: 5    Assessment & Plan  Seizure (HCC)   Patient presented with acute encephalopathy and seizure-like activity.  Video EEG concerning for status epilepticus.  MRI brain without contrast was limited by motion but showed restricted diffusion in the left temporal lobe which could be postictal versus infection.  Status post LP 11/12 and CSF studies unremarkable, not consistent with infection with 2 WBCs.  Protein mildly elevated which could be due to seizure activity itself.  ME panel negative, stand alone HSV PCR also negative.  However with new onset seizures and possible abnormality of the temporal lobe HSV remains a possibility as PCR may be negative and CSF bland and early disease.  CSF culture with no growth.  The patient has grown E. coli and ESBL Proteus in urine culture but in the absence of prior systemic signs of infection or localizing symptoms UTI, doubt this is causing status epilepticus. HIV negative.                -For now continue IV acyclovir 10 mg/kg every 12 hours              -Follow-up pending blood cultures              -MRI brain with contrast when able              -follow up CT C/A/P with contrast to rule out any infection or inflammatory process that may be contributing to seizure-like activity                     -Recommend repeat LP 11/18 to reassess CSF; if HSV remains negative and CSF bland then stop acyclovir    -AEDs per neurology  Bacteriuria  UA with pyuria and urine culture growing E. coli and ESBL Proteus.  No symptoms of infection prior to admission.  Unlikely cause of status epilepticus however in absence of clear cause of seizure activity and while awaiting CT imaging will continue treatment   -Continue Zosyn for now   -If no signs of pyelonephritis on imaging, treat x 3 days which would be  sufficient for cystitis  Shock (HCC)  May be multifactorial related to possible infection, sedating medications.  Patient remains on vasopressor support.   -Hemodynamic support per critical care team   -Antimicrobials as above  Acute hypoxic respiratory failure (HCC)  Patient intubated for airway protection.  Status post bronchoscopy 11/13 for mucous plugging and thick left-sided secretions seen.  Chest x-ray not consistent with pneumonia.  BAL culture shows growth of mixed respiratory viktoriya.   -Ventilator management per critical care team  Morbid obesity with BMI of 50.0-59.9, adult (Formerly [facility])  Affects antimicrobial dosing  CKD (chronic kidney disease)  Lab Results   Component Value Date    EGFR 36 11/17/2024    EGFR 37 11/16/2024    EGFR 37 11/15/2024    CREATININE 1.48 (H) 11/17/2024    CREATININE 1.47 (H) 11/16/2024    CREATININE 1.46 (H) 11/15/2024   Baseline creatinine 1.7-1.9.  Creatinine currently at baseline and stable.  Dose adjust antibiotic car wheels as needed for creatinine clearance.  Monitor creatinine daily  Type 2 diabetes mellitus with hyperglycemia, unspecified whether long term insulin use (Formerly [facility])  Lab Results   Component Value Date    HGBA1C 5.7 (H) 11/13/2024   Diabetes well-controlled.  Glycemic management per primary team    Discussed with patient's partner, who is present at bedside, in detail regarding the above plan.  I have discussed with Dr. Dominguez from critical medicine service regarding the above plan to continue antibiotic and antiviral, pending repeat LP.  She agrees with the plan.    Antibiotics:  Acyclovir day 5  Zosyn day 3    Subjective   Patient remains on ventilator, in ICU.  She remains unresponsive.  Temperature stays down.  No chills.  Stable diarrhea, with rectal tube in place.    Objective :  Temp:  [93.7 °F (34.3 °C)-99 °F (37.2 °C)] 99 °F (37.2 °C)  HR:  [] 119  BP: (123)/(62) 123/62  Resp:  [16-22] 22  SpO2:  [91 %-98 %] 97 %  O2 Device: Ventilator  FiO2 (%):  [50]  50    Physical Exam:     General: Sedated on ventilator, no response to verbal stimuli, comfortable, nontoxic.   Neck:  Supple. No mass.  No lymphadenopathy.   Lungs: Expansion symmetric, no rales, no wheezing, respirations unlabored.   Heart:  Regular rate and rhythm, S1 and S2 normal, no murmur.   Abdomen: Soft, nondistended, non-tender, bowel sounds active all four quadrants, no masses, no organomegaly.   Extremities: Stable mild leg edema. No erythema/warmth. No ulcer. Nontender to palpation.   Skin:  No rash.   Neuro: Not assessable.        Lab Results: I have reviewed the following results:  Results from last 7 days   Lab Units 11/17/24  0517 11/16/24  0503 11/15/24  0524   WBC Thousand/uL 7.99 10.83* 11.81*   HEMOGLOBIN g/dL 9.8* 10.2* 10.5*   PLATELETS Thousands/uL 172 208 243     Results from last 7 days   Lab Units 11/17/24  0517 11/16/24  0503 11/15/24  0524   SODIUM mmol/L 140 137 138   POTASSIUM mmol/L 4.2 3.8 3.8   CHLORIDE mmol/L 108 110* 108   CO2 mmol/L 24 20* 22   BUN mg/dL 16 15 14   CREATININE mg/dL 1.48* 1.47* 1.46*   EGFR ml/min/1.73sq m 36 37 37   CALCIUM mg/dL 7.8* 7.3* 7.4*   AST U/L 15 17 16   ALT U/L 6* 6* 6*   ALK PHOS U/L 96 92 95   ALBUMIN g/dL 2.4* 2.3* 2.5*     Results from last 7 days   Lab Units 11/12/24  2353 11/12/24  2238 11/12/24  2228 11/12/24  2039   BLOOD CULTURE   --  No Growth After 4 Days. No Growth After 4 Days.  --    GRAM STAIN RESULT  1+ Gram negative rods*  1+ Gram positive cocci in clusters*  1+ Polys*  --   --   --    URINE CULTURE   --   --   --  >100,000 cfu/ml Escherichia coli*  >100,000 cfu/ml Proteus mirabilis ESBL*     Results from last 7 days   Lab Units 11/12/24  2132   PROCALCITONIN ng/ml 0.36*         Results from last 7 days   Lab Units 11/17/24  0517   FERRITIN ng/mL 188

## 2024-11-17 NOTE — ASSESSMENT & PLAN NOTE
Adelina Soto is a 64 year old woman with PMHx including HTN, HLD, hypothyroidism, A fib on Warfarin, DM, GERD, anxiety who presented to the hospital for evaluation of altered mental status. Stroke alert called due to gaze deviation and altered mentation. Pt was noted to have two witnessed seizures prior to arrival, and received 6 mg Versed prior to arrival. Continued to have left gaze preference for ED, with lack of command following although was moving her LUE spontaneously. CT head and CTA H/N with no findings that could explain her presentation. After return from CT scan, appeared to have some improvement in her exam but still off from her baseline on discussion with sister at bedside. Concern for status epilepticus with multiple seizures without return to baseline as of yet. Otherwise compliant with Coumadin, with INR 2.42.     VEEG monitoring report of 08:00 11/13: 2:20 this morning and around 02:40 started to have repetitive left frontal seizures, every 10 seconds at times. She was started on sedation again shortly after 03:00 and seizures stopped at that point. EEG is now very low amplitude and slow, with frequent left temporal sharps.     VEEG 24 hrs AM 11:17: EEG is still discontinuous and mostly polymorphic delta activities. Still with some left frontal and left temporal sharps, but no seizures.    MRI: Limited by motion. Mild restricted diffusion with abnormal FLAIR signal in the mesial left temporal lobe could be postictal. Recommend correlation with CSF to exclude infection such as HSV. Abnormal signal in the right thalamus of uncertain etiology.     Impression: status epilepticus, which required multiple AED & sedation medications to control in setting of no history of seizures & currently unknown etiology       Plan:   - Follow trough levels of pheny & valp  - Await final CSF results  - maintenance phenytoin 100 mg q8h   - maintenance  mg q6h  - Seizure precautions  - Will need DMV  report  - Urine cx ESBL MDRO, zosyn begun  - Goal of normothermia and euglycemia  - Therapy when able  - Continue home Warfarin  - Systolic BP goal less than 180  - Continue to monitor closely for changes in examination, notify Neurology if so  - Rest of care per primary  - Seizure precautions outpatient: no driving, no operating heavy machinery, no swimming alone, no climbing, no baths only showers, no cooking alone, or any activity that would put them at increased risk of harm if they were to have a seizure.

## 2024-11-17 NOTE — RESPIRATORY THERAPY NOTE
11/16/24 1923   Respiratory Assessment   Assessment Type During-treatment   General Appearance Sedated   Respiratory Pattern Assisted   Chest Assessment Chest expansion symmetrical   Bilateral Breath Sounds Diminished;Coarse   Cough None   Resp Comments Patient received with ETT pulled out to 22cm @ lip. Ventilatory parameters currently WNL, tube advancement will be forthcoming.   O2 Device G5 Vent   Vent Information   Vent ID [id number]   Vent type Kathleen G5   Kathleen Vent Mode (S)CMV   $ Pulse Oximetry Spot Check Charge Completed   (S)CMV Settings   Resp Rate (BPM) 16 BPM   VT (mL) 420 mL   FIO2 (%) 50 %   PEEP (cmH2O) 8 cmH2O   I:E Ratio 1:2.7   Insp Time (%)   (1 sec)   Flow Trigger (LPM) 5   Humidification Heater   Heater Temperature (Set) 98.6 °F (37 °C)   (S)CMV Actuals   Resp Rate (BPM) 16 BPM   VT (mL) 424   MV 6.9   MAP (cmH2O) 13 cmH2O   Peak Pressure (cmH2O) 25 cmH2O   I:E Ratio (Obs) 1:2.8   Insp Resistance 18   Heater Temperature (Obs) 98.4 °F (36.9 °C)   Static Compliance (mL/cmH20) 30.8 mL/cmH2O   (S)CMV Alarms   High Peak Pressure (cmH2O) 40   Low Pressure (cmH2O) 5 cm H2O   High Resp Rate (BPM) 40 BPM   Low Resp Rate (BPM) 8 BPM   High MV (L/min) 20 L/min   Low MV (L/min) 4 L/min   High VT (mL) 1000 mL   Low VT (mL) 250 mL   ETT  Cuffed 7.5 mm   Placement Date/Time: 11/12/24 1830   Preoxygenated: Yes  Technique: Video laryngoscopy  Type: Cuffed  Tube Size: 7.5 mm  Laryngoscope: GlideScope  Location: Oral  Placement Verification: Auscultation;Chest x-ray;End tidal CO2  Comments: ETT found @ 22...   Secured at (cm) 22   Measured from Lips   Secured Location Right   Secured by Commercial tube hernandez   Cuff Pressure (cm H2O)   (MLT)   HI-LO Suction  Intermittent suction   HI-LO Secretions Scant;Clear   HI-LO Intervention Patent

## 2024-11-17 NOTE — RESPIRATORY THERAPY NOTE
11/17/24 0323   Respiratory Assessment   Resp Comments No adverse events overnight.   Vent Information   Vent type Kathleen G5   Kathleen Vent Mode (S)CMV   $ Vent Daily Charge-Subsequent Yes   $ Pulse Oximetry Spot Check Charge Completed   (S)CMV Settings   Resp Rate (BPM) 16 BPM   VT (mL) 420 mL   FIO2 (%) 50 %   PEEP (cmH2O) 8 cmH2O   Insp Time (%)   (1 sec)   Flow Trigger (LPM) 5   Humidification Heater   Heater Temperature (Set) 98.6 °F (37 °C)   (S)CMV Actuals   Resp Rate (BPM) 16 BPM   VT (mL) 451   MV 7.1   MAP (cmH2O) 13 cmH2O   Peak Pressure (cmH2O) 23 cmH2O   (S)CMV Alarms   High Peak Pressure (cmH2O) 40   Low Pressure (cmH2O) 5 cm H2O   High Resp Rate (BPM) 40 BPM   Low Resp Rate (BPM) 8 BPM   High MV (L/min) 20 L/min   Low MV (L/min) 4 L/min

## 2024-11-17 NOTE — PROGRESS NOTES
Progress Note - Critical Care/ICU   Name: Adelina Soto 64 y.o. female I MRN: [id number]  Unit/Bed#: MICU 01 I Date of Admission: 11/12/2024   Date of Service: 11/17/2024 I Hospital Day: 5       Summary:   65 yo female, PMH Afib on warfarin, HTN, T2DM, morbid obesity, HLD, hypothyroidism. She presented to [facility] on 11/12 due to AMS after she was found by sister having seizure-like activity. With EMS had a witnessed tonic-clonic seizure and was given 6mg versed. On arrival had left gaze preference. CTH/ CTA unremarkable. MRI brain with c/f HSV encephalitis. Pt was intubated for airway protection and transferred to [facility] [place]. LP performed on arrival shows elevated glucose 89, elevated protein 74, 339 RBCs, 2 WBCs, ME panel negative. vEEG was initiated and showed repetitive left frontal seizures which stopped with sedation, pt now on keppra, phenytoin, and depacon with no further seizures on vEEG.     Assessment & Plan   Neuro:   Status epilepticus   Pt presented due to AMS with witnessed seizures  Initial MRI brain c/f HSV encephalitis, started on acyclovir  Neurology following, vEEG initially showing left frontal seizures, burst suppressed initially, now on keppra 1g q12h, phenytoin 100mg q8h, depacon 375mg q6h  LP with elevated glucose 89, elevated protein 74, 339 RBCs, 2 WBCs, ME panel negative  ID following, continue acyclovir 10mg/kg q12h for now  F/u HSV PCR, paraneoplastic, [place], autoimmune, [place], gram stain from CSF  Plan for repeat LP 11/18, if HSV still negative and CSF bland can stop acyclovir  Pending repeat MRI brain w contrast    H/o depression  Continue home zoloft     CV:   Shock, likely sedation related  Pressors: levo 2, vaso 0.04  Echo this admission normal, EF 60%    Elevated troponin  54 on arrival, peaked at 1887 on 11/13  ECG without ST changes  Likely demand related due to RVR and/or shock as above     H/o Afib  Holding home lopressor due to hypotension  AC with heparin drip for now,  transition back to warfarin when appropriate    H/o HTN  Hold home meds: amlodipine 5mg daily, lasix 40mg daily, lopressor 50mg bid    Pulm:  Acute hypoxic respiratory failure   Intubated for airway protection due to AMS  Vent: CMV 16/420/8/50  Bronch 11/12 with generalized edematous friable mucosa and moderate thick purulent secretions, Cx with GNR and GPC, f/u speciation   Initial CXR on arrival with mild edema more on right side, then had left side white out likely from mucous plugging and atelectasis given improvement after bronchoscopy, now continues to have edema in bilateral lung fields     GI:   GERD  Continue home pepcid    :   CKD  Baseline Cr 1.4-1.8  Remains within baseline throughout hospital stay  Monitor renal function, monitor I/O's, avoid nephrotoxins     F/E/N:   F: none   E: monitor and replete   N: TF Vital 1.2 @55    Heme/Onc:   Anemia   Hb 9-10 this admission  F/u iron panel to better classify etiology  Monitor CBC, transfuse for Hb <7    Endo:   Hypothyroidism  TSH 0.29, T4 1.23  Continue home levothyroxine, outpatient follow up     T2DM  Diet controlled, not on meds outpatient  Monitor BG for goal 140-180  Add SSI if needed    ID:   Possible encephalitis   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 Yes  Need for changes to pain regimen? No   B: Both Spontaneous Awakening Trials (SATs) and Spontaneous Breathing Trials (SBTs) Plan to perform spontaneous awakening trial today? Yes    Plan to perform spontaneous breathing trial today? Yes   Obvious barriers to extubation? Yes   C: Choice of Sedation RASS Goal: 0 Alert and Calm  Need for changes to sedation or analgesia regimen? No   D: Delirium CAM-ICU: Positive   E: Early Mobility  Plan for early mobility? Yes   F: Family Engagement Plan for family engagement today? Yes       Antibiotic Review: Continue broad spectrum secondary to severity of illness.     Review of Invasive Devices:    Stringer Plan: Continue for accurate I/O monitoring for 48 hours  Central access plan: Patient has multiple central venous catheters.   Sravani Plan: Keep arterial line for hemodynamic monitoring, frequent ABGs, and frequent labs    Prophylaxis:  VTE VTE covered by:  heparin (porcine), Intravenous, 10.1 Units/kg/hr at 11/16/24 1157       Stress Ulcer  covered byfamotidine (PEPCID) 20 mg tablet [[id number]] (Long-Term Med), omeprazole (PRILOSEC) suspension 2 mg/mL [[id number]], pantoprazole (PROTONIX) 40 mg tablet [[id number]] (Long-Term Med)         24 Hour Events : Overnight pt tachy to 110's. This morning unresponsive on exam.  Subjective   Review of Systems: Review of Systems not obtainable due to Altered mental status    Objective :                   Vitals I/O      Most Recent Min/Max in 24hrs   Temp 99 °F (37.2 °C) Temp  Min: 93.7 °F (34.3 °C)  Max: 99 °F (37.2 °C)   Pulse (!) 111 Pulse  Min: 91  Max: 122   Resp 16 Resp  Min: 16  Max: 21   /67 No data recorded   O2 Sat 98 % SpO2  Min: 91 %  Max: 98 %      Intake/Output Summary (Last 24 hours) at 11/17/2024 0924  Last data filed at 11/17/2024 0800  Gross per 24 hour   Intake 3071.46 ml   Output 4075 ml   Net -1003.54 ml       Diet Enteral/Parenteral; Tube Feeding No Oral Diet; Vital AF 1.2; Continuous; 55    Invasive Monitoring   Arterial Line  Sravani /61  Arterial Line BP  Min: 102/58  Max: 135/66   MAP 79 mmHg  Arterial Line MAP (mmHg)  Min: 66 mmHg  Max: 87 mmHg           Physical Exam   Physical  Exam  Skin:     General: Skin is warm and dry.   HENT:      Head: Normocephalic and atraumatic.      Mouth/Throat:      Mouth: Mucous membranes are moist.   Cardiovascular:      Rate and Rhythm: Normal rate and regular rhythm.      Heart sounds: Normal heart sounds.   Musculoskeletal:      Right lower leg: Edema present.      Left lower leg: Edema present.   Abdominal: General: Bowel sounds are normal. There is no distension.      Palpations: Abdomen is soft.      Tenderness: There is no guarding.   Constitutional:       General: She is not in acute distress.     Interventions: She is intubated and restrained.   Pulmonary:      Effort: Pulmonary effort is normal. No respiratory distress. She is intubated.      Breath sounds: Normal breath sounds.   Neurological:      Mental Status: She is unresponsive.   Genitourinary/Anorectal:     Rectum: Rectal tube present.   Stringer present.        Diagnostic Studies        Lab Results: I have reviewed the following results:     Medications:  Scheduled PRN   acyclovir, 10 mg/kg (Adjusted), Q12H  atorvastatin, 10 mg, Daily With Dinner  chlorhexidine, 15 mL, Q12H LINO  famotidine, 20 mg, Daily  insulin lispro, 2-12 Units, Q6H LINO  levalbuterol, 1.25 mg, TID  levETIRAcetam, 1,000 mg, Q12H LINO  levothyroxine, 100 mcg, Early Morning  EARLINE ANTIFUNGAL, , BID  phenytoin, 100 mg, Q8H LINO  piperacillin-tazobactam, 4.5 g, Q8H  sertraline, 50 mg, Daily  thiamine, 200 mg, Daily  [START ON 11/20/2024] thiamine, 100 mg, Daily  valproate sodium, 375 mg, Q6H LINO      acetaminophen, 1,000 mg, Q6H PRN  albuterol, 2.5 mg, Q6H PRN  fentaNYL, 50 mcg, Q1H PRN  LORazepam, 2 mg, Q4H PRN  ondansetron, 4 mg, Q4H PRN       Continuous    heparin (porcine), 3-20 Units/kg/hr (Order-Specific), Last Rate: 10.1 Units/kg/hr (11/16/24 1157)  norepinephrine, 1-30 mcg/min, Last Rate: 2 mcg/min (11/17/24 0231)  vasopressin, 0.04 Units/min, Last Rate: 0.04 Units/min (11/17/24 0209)         Labs:   CBC    Recent Labs      11/16/24  0503 11/17/24  0517   WBC 10.83* 7.99   HGB 10.2* 9.8*   HCT 33.4* 33.6*    172     BMP    Recent Labs     11/16/24  0503 11/17/24  0517   SODIUM 137 140   K 3.8 4.2   * 108   CO2 20* 24   AGAP 7 8   BUN 15 16   CREATININE 1.47* 1.48*   CALCIUM 7.3* 7.8*       Coags    Recent Labs     11/16/24  0503 11/17/24  0517   PTT 76* 85*        Additional Electrolytes  Recent Labs     11/16/24  0503 11/17/24  0517   MG 1.9 1.9   PHOS 3.3 3.5          Blood Gas    Recent Labs     11/17/24  0524   PHART 7.312*   OLN9IXP 49.4*   PO2ART 114.0   CMS3SUR 24.4   BEART -2.4   SOURCE Line, Arterial     Recent Labs     11/17/24  0524   SOURCE Line, Arterial    LFTs  Recent Labs     11/16/24  0503 11/17/24  0517   ALT 6* 6*   AST 17 15   ALKPHOS 92 96   ALB 2.3* 2.4*   TBILI 0.55 0.39       Infectious  No recent results  Glucose  Recent Labs     11/16/24  0503 11/17/24  0517   GLUC 172* 134

## 2024-11-18 ENCOUNTER — APPOINTMENT (INPATIENT)
Dept: NEUROLOGY | Facility: CLINIC | Age: 64
DRG: 097 | End: 2024-11-18
Payer: COMMERCIAL

## 2024-11-18 ENCOUNTER — APPOINTMENT (INPATIENT)
Dept: RADIOLOGY | Facility: HOSPITAL | Age: 64
DRG: 097 | End: 2024-11-18
Payer: COMMERCIAL

## 2024-11-18 LAB
ANION GAP SERPL CALCULATED.3IONS-SCNC: 8 MMOL/L (ref 4–13)
APTT PPP: 46 SECONDS (ref 23–34)
ATRIAL RATE: 102 BPM
BACTERIA BLD CULT: NORMAL
BACTERIA BLD CULT: NORMAL
BUN SERPL-MCNC: 18 MG/DL (ref 5–25)
CALCIUM SERPL-MCNC: 8 MG/DL (ref 8.4–10.2)
CHLORIDE SERPL-SCNC: 103 MMOL/L (ref 96–108)
CO2 SERPL-SCNC: 27 MMOL/L (ref 21–32)
CREAT SERPL-MCNC: 1.72 MG/DL (ref 0.6–1.3)
ERYTHROCYTE [DISTWIDTH] IN BLOOD BY AUTOMATED COUNT: 16.9 % (ref 11.6–15.1)
GFR SERPL CREATININE-BSD FRML MDRD: 30 ML/MIN/1.73SQ M
GLUCOSE SERPL-MCNC: 114 MG/DL (ref 65–140)
GLUCOSE SERPL-MCNC: 119 MG/DL (ref 65–140)
GLUCOSE SERPL-MCNC: 127 MG/DL (ref 65–140)
GLUCOSE SERPL-MCNC: 133 MG/DL (ref 65–140)
GLUCOSE SERPL-MCNC: 136 MG/DL (ref 65–140)
HCT VFR BLD AUTO: 31.4 % (ref 34.8–46.1)
HGB BLD-MCNC: 9.5 G/DL (ref 11.5–15.4)
INR PPP: 1.81 (ref 0.85–1.19)
MAGNESIUM SERPL-MCNC: 1.6 MG/DL (ref 1.9–2.7)
MCH RBC QN AUTO: 31.4 PG (ref 26.8–34.3)
MCHC RBC AUTO-ENTMCNC: 30.3 G/DL (ref 31.4–37.4)
MCV RBC AUTO: 104 FL (ref 82–98)
MISCELLANEOUS LAB TEST RESULT: NORMAL
PHOSPHATE SERPL-MCNC: 3.3 MG/DL (ref 2.3–4.1)
PLATELET # BLD AUTO: 139 THOUSANDS/UL (ref 149–390)
PMV BLD AUTO: 10 FL (ref 8.9–12.7)
POTASSIUM SERPL-SCNC: 3.9 MMOL/L (ref 3.5–5.3)
PROTHROMBIN TIME: 21.1 SECONDS (ref 12.3–15)
QRS AXIS: 258 DEGREES
QRSD INTERVAL: 96 MS
QT INTERVAL: 298 MS
QTC INTERVAL: 393 MS
RBC # BLD AUTO: 3.03 MILLION/UL (ref 3.81–5.12)
SODIUM SERPL-SCNC: 138 MMOL/L (ref 135–147)
T WAVE AXIS: 147 DEGREES
VALPROATE FREE SERPL-MCNC: 40.3 UG/ML (ref 6–22)
VENTRICULAR RATE: 105 BPM
WBC # BLD AUTO: 6.04 THOUSAND/UL (ref 4.31–10.16)

## 2024-11-18 PROCEDURE — 93010 ELECTROCARDIOGRAM REPORT: CPT | Performed by: INTERNAL MEDICINE

## 2024-11-18 PROCEDURE — NC001 PR NO CHARGE: Performed by: STUDENT IN AN ORGANIZED HEALTH CARE EDUCATION/TRAINING PROGRAM

## 2024-11-18 PROCEDURE — 94003 VENT MGMT INPAT SUBQ DAY: CPT

## 2024-11-18 PROCEDURE — 94760 N-INVAS EAR/PLS OXIMETRY 1: CPT

## 2024-11-18 PROCEDURE — 95715 VEEG EA 12-26HR INTMT MNTR: CPT

## 2024-11-18 PROCEDURE — 99233 SBSQ HOSP IP/OBS HIGH 50: CPT | Performed by: STUDENT IN AN ORGANIZED HEALTH CARE EDUCATION/TRAINING PROGRAM

## 2024-11-18 PROCEDURE — 85730 THROMBOPLASTIN TIME PARTIAL: CPT | Performed by: INTERNAL MEDICINE

## 2024-11-18 PROCEDURE — 99291 CRITICAL CARE FIRST HOUR: CPT | Performed by: STUDENT IN AN ORGANIZED HEALTH CARE EDUCATION/TRAINING PROGRAM

## 2024-11-18 PROCEDURE — 84100 ASSAY OF PHOSPHORUS: CPT

## 2024-11-18 PROCEDURE — 80048 BASIC METABOLIC PNL TOTAL CA: CPT

## 2024-11-18 PROCEDURE — 94664 DEMO&/EVAL PT USE INHALER: CPT

## 2024-11-18 PROCEDURE — 94640 AIRWAY INHALATION TREATMENT: CPT

## 2024-11-18 PROCEDURE — 82948 REAGENT STRIP/BLOOD GLUCOSE: CPT

## 2024-11-18 PROCEDURE — 85610 PROTHROMBIN TIME: CPT | Performed by: STUDENT IN AN ORGANIZED HEALTH CARE EDUCATION/TRAINING PROGRAM

## 2024-11-18 PROCEDURE — 85027 COMPLETE CBC AUTOMATED: CPT

## 2024-11-18 PROCEDURE — 62270 DX LMBR SPI PNXR: CPT | Performed by: STUDENT IN AN ORGANIZED HEALTH CARE EDUCATION/TRAINING PROGRAM

## 2024-11-18 PROCEDURE — 83735 ASSAY OF MAGNESIUM: CPT

## 2024-11-18 PROCEDURE — 94669 MECHANICAL CHEST WALL OSCILL: CPT

## 2024-11-18 PROCEDURE — 009U3ZX DRAINAGE OF SPINAL CANAL, PERCUTANEOUS APPROACH, DIAGNOSTIC: ICD-10-PCS | Performed by: STUDENT IN AN ORGANIZED HEALTH CARE EDUCATION/TRAINING PROGRAM

## 2024-11-18 PROCEDURE — 95720 EEG PHY/QHP EA INCR W/VEEG: CPT | Performed by: PSYCHIATRY & NEUROLOGY

## 2024-11-18 RX ORDER — PHYTONADIONE 10 MG/ML
10 INJECTION, EMULSION INTRAMUSCULAR; INTRAVENOUS; SUBCUTANEOUS ONCE
Status: COMPLETED | OUTPATIENT
Start: 2024-11-18 | End: 2024-11-18

## 2024-11-18 RX ORDER — LEVETIRACETAM 500 MG/5ML
750 INJECTION, SOLUTION, CONCENTRATE INTRAVENOUS EVERY 12 HOURS SCHEDULED
Status: DISCONTINUED | OUTPATIENT
Start: 2024-11-18 | End: 2024-11-22

## 2024-11-18 RX ORDER — MAGNESIUM SULFATE HEPTAHYDRATE 40 MG/ML
2 INJECTION, SOLUTION INTRAVENOUS ONCE
Status: COMPLETED | OUTPATIENT
Start: 2024-11-18 | End: 2024-11-18

## 2024-11-18 RX ADMIN — PHYTONADIONE 10 MG: 10 INJECTION, EMULSION INTRAMUSCULAR; INTRAVENOUS; SUBCUTANEOUS at 15:05

## 2024-11-18 RX ADMIN — ACYCLOVIR SODIUM 775 MG: 50 INJECTION, SOLUTION INTRAVENOUS at 10:00

## 2024-11-18 RX ADMIN — CHLORHEXIDINE GLUCONATE 0.12% ORAL RINSE 15 ML: 1.2 LIQUID ORAL at 09:00

## 2024-11-18 RX ADMIN — CHLORHEXIDINE GLUCONATE 0.12% ORAL RINSE 15 ML: 1.2 LIQUID ORAL at 20:27

## 2024-11-18 RX ADMIN — VALPROATE SODIUM 500 MG: 100 INJECTION, SOLUTION INTRAVENOUS at 22:10

## 2024-11-18 RX ADMIN — SERTRALINE HYDROCHLORIDE 50 MG: 50 TABLET ORAL at 09:50

## 2024-11-18 RX ADMIN — THIAMINE HYDROCHLORIDE 500 MG: 100 INJECTION, SOLUTION INTRAMUSCULAR; INTRAVENOUS at 15:07

## 2024-11-18 RX ADMIN — VASOPRESSIN 0.04 UNITS/MIN: 20 INJECTION INTRAVENOUS at 10:16

## 2024-11-18 RX ADMIN — LEVALBUTEROL HYDROCHLORIDE 1.25 MG: 1.25 SOLUTION RESPIRATORY (INHALATION) at 19:34

## 2024-11-18 RX ADMIN — VALPROATE SODIUM 375 MG: 100 INJECTION, SOLUTION INTRAVENOUS at 10:02

## 2024-11-18 RX ADMIN — THIAMINE HYDROCHLORIDE 500 MG: 100 INJECTION, SOLUTION INTRAMUSCULAR; INTRAVENOUS at 10:45

## 2024-11-18 RX ADMIN — LEVETIRACETAM 750 MG: 100 INJECTION, SOLUTION INTRAVENOUS at 20:27

## 2024-11-18 RX ADMIN — ATORVASTATIN CALCIUM 10 MG: 10 TABLET, FILM COATED ORAL at 17:14

## 2024-11-18 RX ADMIN — PHENYTOIN SODIUM 100 MG: 50 INJECTION INTRAMUSCULAR; INTRAVENOUS at 09:46

## 2024-11-18 RX ADMIN — LEVETIRACETAM 1000 MG: 100 INJECTION, SOLUTION INTRAVENOUS at 09:00

## 2024-11-18 RX ADMIN — MAGNESIUM SULFATE HEPTAHYDRATE 2 G: 40 INJECTION, SOLUTION INTRAVENOUS at 09:55

## 2024-11-18 RX ADMIN — LEVALBUTEROL HYDROCHLORIDE 1.25 MG: 1.25 SOLUTION RESPIRATORY (INHALATION) at 14:21

## 2024-11-18 RX ADMIN — Medication 12.5 MG: at 20:27

## 2024-11-18 RX ADMIN — MICONAZOLE NITRATE: 20 CREAM TOPICAL at 17:14

## 2024-11-18 RX ADMIN — VALPROATE SODIUM 375 MG: 100 INJECTION, SOLUTION INTRAVENOUS at 03:42

## 2024-11-18 RX ADMIN — Medication 12.5 MG: at 09:01

## 2024-11-18 RX ADMIN — VASOPRESSIN 0.04 UNITS/MIN: 20 INJECTION INTRAVENOUS at 18:40

## 2024-11-18 RX ADMIN — LEVALBUTEROL HYDROCHLORIDE 1.25 MG: 1.25 SOLUTION RESPIRATORY (INHALATION) at 07:22

## 2024-11-18 RX ADMIN — VASOPRESSIN 0.04 UNITS/MIN: 20 INJECTION INTRAVENOUS at 02:25

## 2024-11-18 RX ADMIN — LEVOTHYROXINE SODIUM 100 MCG: 100 TABLET ORAL at 05:13

## 2024-11-18 RX ADMIN — ACYCLOVIR SODIUM 775 MG: 50 INJECTION, SOLUTION INTRAVENOUS at 20:40

## 2024-11-18 RX ADMIN — VALPROATE SODIUM 500 MG: 100 INJECTION, SOLUTION INTRAVENOUS at 16:09

## 2024-11-18 RX ADMIN — MICONAZOLE NITRATE: 20 CREAM TOPICAL at 09:50

## 2024-11-18 RX ADMIN — FAMOTIDINE 20 MG: 20 TABLET, FILM COATED ORAL at 09:01

## 2024-11-18 RX ADMIN — THIAMINE HYDROCHLORIDE 500 MG: 100 INJECTION, SOLUTION INTRAMUSCULAR; INTRAVENOUS at 20:27

## 2024-11-18 NOTE — PROGRESS NOTES
Progress Note - Critical Care/ICU   Name: Adelina Soto 64 y.o. female I MRN: [id number]  Unit/Bed#: MICU 01 I Date of Admission: 11/12/2024   Date of Service: 11/18/2024 I Hospital Day: 6       Summary:   65 yo female, PMH Afib on warfarin, HTN, T2DM, morbid obesity, HLD, hypothyroidism. She presented to [facility] on 11/12 due to AMS after she was found by sister having seizure-like activity. With EMS had a witnessed tonic-clonic seizure and was given 6mg versed. On arrival had left gaze preference. CTH/ CTA unremarkable. MRI brain with c/f HSV encephalitis. Pt was intubated for airway protection and transferred to [facility]. LP performed on arrival shows elevated glucose 89, elevated protein 74, 339 RBCs, 2 WBCs, ME panel negative. vEEG was initiated and showed repetitive left frontal seizures which stopped with sedation, pt now on keppra, phenytoin, and depacon with no further seizures on vEEG.     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, started on acyclovir  Neurology following, vEEG initially showing left frontal seizures, burst suppressed initially, now on Keppra 1g q12h, Phenytoin 100 mg q8h, Depacon 375 mg q6h  LP showed glucose 89, protein 74, 339 RBC's, 2 WBC's, ME panel negative  ID following, recommend continuing acyclovir 10 mg/kg every 12 hours for time being  Plan for repeat LP today; paraneoplastic, West Nile, autoimmune, powassan, gram stain from CSF  Pending repeat MRI brain with contrast    Diagnosis: H/o depression  Plan:  Continue home Zoloft    CV:   Diagnosis: Shock/hypotension, likely in setting of sedation  Plan:  MAP 65-70 on Vaso alone  Wean to target MAP > 65 as able    Diagnosis: Elevated troponin  54 on arrival, peaked at 1887 on 11/13  ECG without ST changes  Likely demand related due to RVR and/or hypotension as above  Plan:  Monitor telemetry, rate control < 110  bpm    Diagnosis: H/o Afib on Warfarin  Plan:  Holding home Lopressor d/t hypotension  AC with heparin gtt, transition back to Warfarin when appropriate  Held into 11/18 prior to LP    Pulm:  Diagnosis: Acute hypoxic respiratory failure  Intubated on arrival for airway protection due to AMS  Vent settings: SCMV, 16/420/8/50%  Bronch 11/12 with generalized edematous friable mucosa and moderate thick purulent secretions, culture with mixed respiratory viktoriya  Initial chest x-ray on arrival with mild edema more so on the right, left-sided whiteout likely from mucous plugging and atelectasis, given improvement after bronchoscopy, now continues to have crackles bilaterally  GI:   Diagnosis: GERD  Plan:  Continue home pepcid    :   Diagnosis: CKD  Baseline Cr 1.4-1.8  Remains baseline throughout hospital stay  Monitor renal fx, monitor I/O's, avoid nephrotoxins    F/E/N:   F - none  E - replenished w/ 2g Mg IVPB  N - TF with Vital 1.2 @ 55/hr    Heme/Onc:   Diagnosis: Anemia  Hgb 9-10 this admission  Stable thus far, transfuse if < 7    Endo:   Diagnosis: Hypothyroidism  TSH 0.29, T4 1.23  Continue home levothyroxine    Diagnosis: T2DM  Diet controlled, not on medications outpatient  Monitor BG for goal 140-180  Add SSI if needed    ID:   Diagnosis: Possible encephalitis  Initial MRI brain w/ c/f HSV encephalitis  LP and ME panel neg  ID following, continue Acyclovir 10 mg/kg q12h, repeat LP today and if HSV negative again d/c acyclovir    Diagnosis: UTI, possible pneumonia  Urine culture growing E. coli and ESBL proteus  Bronchoscopy with copious purulent secretions, however culture with mixed respiratory viktoriya  Afebrile > 24 hours, WBC downtrending    MSK/Skin:   Diagnosis: Pressure wounds  Continue wound care    Disposition: Critical care    ICU Core Measures     Vented Patient  VAP Bundle  VAP bundle ordered     A: Assess, Prevent, and Manage Pain Has pain been assessed? NA  Need for changes to pain regimen? No   B:  Both Spontaneous Awakening Trials (SATs) and Spontaneous Breathing Trials (SBTs) Plan to perform spontaneous awakening trial today? Yes   Plan to perform spontaneous breathing trial today? Yes   Obvious barriers to extubation? Yes   C: Choice of Sedation RASS Goal: 0 Alert and Calm  Need for changes to sedation or analgesia regimen? No   D: Delirium CAM-ICU: Unable to perform secondary to Acute cognitive dysfunction   E: Early Mobility  Plan for early mobility? NA   F: Family Engagement Plan for family engagement today? Yes       Antibiotic Review: Patient on appropriate coverage based on culture data.     Review of Invasive Devices:    Stringer Plan: Continue for accurate I/O monitoring for 48 hours  Central access plan: Medications requiring central line  Sravani Plan: Keep arterial line for hemodynamic monitoring and frequent labs    Prophylaxis:  VTE VTE covered by:    None       Stress Ulcer  covered byfamotidine (PEPCID) 20 mg tablet [[id number]] (Long-Term Med), famotidine (PEPCID) tablet 20 mg [[id number]], pantoprazole (PROTONIX) 40 mg tablet [[id number]] (Long-Term Med)         24 Hour Events : AISSATOU overnight. No events on video EEG or seizure activity. Afebrile, WBC improving. Weaning pressors, on Vaso alone this morning.  Subjective   Review of Systems: See HPI for Review of Systems    Objective :                   Vitals I/O      Most Recent Min/Max in 24hrs   Temp 97.5 °F (36.4 °C) Temp  Min: 97.3 °F (36.3 °C)  Max: 99.4 °F (37.4 °C)   Pulse (!) 110 Pulse  Min: 102  Max: 119   Resp 16 Resp  Min: 16  Max: 22   /59 BP  Min: 112/59  Max: 123/62   O2 Sat 98 % SpO2  Min: 94 %  Max: 99 %      Intake/Output Summary (Last 24 hours) at 11/18/2024 0718  Last data filed at 11/18/2024 0517  Gross per 24 hour   Intake 2751.56 ml   Output 4730 ml   Net -1978.44 ml       Diet Enteral/Parenteral; Tube Feeding No Oral Diet; Vital AF 1.2; Continuous; 55    Invasive Monitoring   Arterial Line  Sravani BP 96/53  Arterial Line  BP  Min: 92/52  Max: 121/63   MAP 68 mmHg  Arterial Line MAP (mmHg)  Min: 61 mmHg  Max: 83 mmHg           Physical Exam   Physical Exam  Eyes:      Comments: Pupils bilaterally reactive, sluggish   Skin:     General: Skin is warm and dry.   HENT:      Head: Normocephalic and atraumatic.      Mouth/Throat:      Mouth: No angioedema.   Cardiovascular:      Rate and Rhythm: Tachycardia present. Rhythm irregular.   Musculoskeletal:      Right lower leg: Trace Edema present.      Left lower leg: Trace Edema present.   Abdominal: General: There is no distension.      Palpations: Abdomen is soft.      Tenderness: There is no abdominal tenderness.   Constitutional:       General: She is not in acute distress.     Interventions: She is intubated. She is not sedated.     Comments: Patient is unresponsive   Pulmonary:      Effort: She is intubated.      Breath sounds: Normal breath sounds.   Neurological:      Mental Status: She is unresponsive.        Gag reflex intact.          Diagnostic Studies        Lab Results: I have reviewed the following results:     Medications:  Scheduled PRN   acyclovir, 10 mg/kg (Adjusted), Q12H  atorvastatin, 10 mg, Daily With Dinner  chlorhexidine, 15 mL, Q12H LINO  famotidine, 20 mg, Daily  furosemide, 40 mg, BID (diuretic)  insulin lispro, 2-12 Units, Q6H LINO  levalbuterol, 1.25 mg, TID  levETIRAcetam, 1,000 mg, Q12H LINO  levothyroxine, 100 mcg, Early Morning  metoprolol tartrate, 12.5 mg, Q12H LINO  EARLINE ANTIFUNGAL, , BID  phenytoin, 100 mg, Q8H  sertraline, 50 mg, Daily  thiamine, 200 mg, Daily  [START ON 11/20/2024] thiamine, 100 mg, Daily  valproate sodium, 375 mg, Q6H LINO      acetaminophen, 1,000 mg, Q6H PRN  albuterol, 2.5 mg, Q6H PRN  fentaNYL, 50 mcg, Q1H PRN  LORazepam, 2 mg, Q4H PRN  ondansetron, 4 mg, Q4H PRN       Continuous    norepinephrine, 1-30 mcg/min, Last Rate: Stopped (11/17/24 1700)  vasopressin, 0.04 Units/min, Last Rate: 0.04 Units/min (11/18/24 0225)         Labs:    CBC    Recent Labs     11/17/24 0517 11/18/24 0447   WBC 7.99 6.04   HGB 9.8* 9.5*   HCT 33.6* 31.4*    139*   BANDSPCT 5  --      BMP    Recent Labs     11/17/24 0517 11/18/24 0447   SODIUM 140 138   K 4.2 3.9    103   CO2 24 27   AGAP 8 8   BUN 16 18   CREATININE 1.48* 1.72*   CALCIUM 7.8* 8.0*       Coags    Recent Labs     11/17/24 0517 11/18/24 0447   PTT 85* 46*        Additional Electrolytes  Recent Labs     11/17/24 0517 11/18/24 0447   MG 1.9 1.6*   PHOS 3.5 3.3          Blood Gas    Recent Labs     11/17/24 0524   PHART 7.312*   AFH6AWI 49.4*   PO2ART 114.0   [id number] 24.4   BEART -2.4   SOURCE Line, Arterial     Recent Labs     11/17/24 0524   SOURCE Line, Arterial    LFTs  Recent Labs     11/17/24 0517   ALT 6*   AST 15   ALKPHOS 96   ALB 2.4*   TBILI 0.39       Infectious  No recent results  Glucose  Recent Labs     11/17/24 0517 11/18/24 0447   GLUC 134 136

## 2024-11-18 NOTE — PROCEDURES
Lumbar puncture    Date/Time: 11/18/2024 3:58 PM    Performed by: Jose Luis Duff MD  Authorized by: Jose Luis Duff MD  [place] Protocol:  Procedure performed by: (Gregorio Crain DO)  Consent: Verbal consent obtained. Written consent obtained.  Risks and benefits: risks, benefits and alternatives were discussed  Consent given by: spouse  Required items: required blood products, implants, devices, and special equipment available  Patient identity confirmed: arm band    Patient location:  Bedside  Pre-procedure details:     Preparation: Patient was prepped and draped in usual sterile fashion    Indications:     Indications: evaluation for infection and evaluation for altered mental status    Anesthesia (see MAR for exact dosages):     Anesthesia method:  None  Procedure details:     Lumbar space:  L4-L5 interspace    Patient position:  L lateral decubitus    Equipment: Lumbar puncture kit used      Needle gauge:  20G x 3.5in    Ultrasound guidance: no      Number of attempts:  3    Fluid appearance:  Bloody  Post-procedure:     Puncture site:  Direct pressure applied and adhesive bandage applied    Patient tolerance of procedure:  Tolerated well, no immediate complications    Complication comments (i.e., not enough fluid obtained, etc.):  Unsuccessful, small amount of bloody output obtained    Patient education: Intubated and unresponsive.

## 2024-11-18 NOTE — PLAN OF CARE
Problem: PAIN - ADULT  Goal: Verbalizes/displays adequate comfort level or baseline comfort level  Description: Interventions:  - Encourage patient to monitor pain and request assistance  - Assess pain using appropriate pain scale  - Administer analgesics based on type and severity of pain and evaluate response  - Implement non-pharmacological measures as appropriate and evaluate response  - Consider cultural and social influences on pain and pain management  - Notify physician/advanced practitioner if interventions unsuccessful or patient reports new pain  Outcome: Progressing     Problem: INFECTION - ADULT  Goal: Absence or prevention of progression during hospitalization  Description: INTERVENTIONS:  - Assess and monitor for signs and symptoms of infection  - Monitor lab/diagnostic results  - Monitor all insertion sites, i.e. indwelling lines, tubes, and drains  - Monitor endotracheal if appropriate and nasal secretions for changes in amount and color  - [place] appropriate cooling/warming therapies per order  - Administer medications as ordered  - Instruct and encourage patient and family to use good hand hygiene technique  - Identify and instruct in appropriate isolation precautions for identified infection/condition  Outcome: Progressing  Goal: Absence of fever/infection during neutropenic period  Description: INTERVENTIONS:  - Monitor WBC    Outcome: Progressing     Problem: SAFETY ADULT  Goal: Patient will remain free of falls  Description: INTERVENTIONS:  - Educate patient/family on patient safety including physical limitations  - Instruct patient to call for assistance with activity   - Consult OT/PT to assist with strengthening/mobility   - Keep Call bell within reach  - Keep bed low and locked with side rails adjusted as appropriate  - Keep care items and personal belongings within reach  - Initiate and maintain comfort rounds  - Make Fall Risk Sign visible to staff  - Apply yellow socks and bracelet  for high fall risk patients  - Consider moving patient to room near nurses station  Outcome: Progressing  Goal: Maintain or return to baseline ADL function  Description: INTERVENTIONS:  -  Assess patient's ability to carry out ADLs; assess patient's baseline for ADL function and identify physical deficits which impact ability to perform ADLs (bathing, care of mouth/teeth, toileting, grooming, dressing, etc.)  - Assess/evaluate cause of self-care deficits   - Assess range of motion  - Assess patient's mobility; develop plan if impaired  - Assess patient's need for assistive devices and provide as appropriate  - Encourage maximum independence but intervene and supervise when necessary  - Involve family in performance of ADLs  - Assess for home care needs following discharge   - Consider OT consult to assist with ADL evaluation and planning for discharge  - Provide patient education as appropriate  Outcome: Progressing  Goal: Maintains/Returns to pre admission functional level  Description: INTERVENTIONS:  - Perform AM-PAC 6 Click Basic Mobility/ Daily Activity assessment daily.  - Set and communicate daily mobility goal to care team and patient/family/caregiver.   - Collaborate with rehabilitation services on mobility goals if consulted  - Record patient progress and toleration of activity level   Outcome: Progressing     Problem: DISCHARGE PLANNING  Goal: Discharge to home or other facility with appropriate resources  Description: INTERVENTIONS:  - Identify barriers to discharge w/patient and caregiver  - Arrange for needed discharge resources and transportation as appropriate  - Identify discharge learning needs (meds, wound care, etc.)  - Arrange for interpretive services to assist at discharge as needed  - Refer to Case Management Department for coordinating discharge planning if the patient needs post-hospital services based on physician/advanced practitioner order or complex needs related to functional status,  cognitive ability, or social support system  Outcome: Progressing     Problem: Knowledge Deficit  Goal: Patient/family/caregiver demonstrates understanding of disease process, treatment plan, medications, and discharge instructions  Description: Complete learning assessment and assess knowledge base.  Interventions:  - Provide teaching at level of understanding  - Provide teaching via preferred learning methods  Outcome: Progressing     Problem: NEUROSENSORY - ADULT  Goal: Achieves stable or improved neurological status  Description: INTERVENTIONS  - Monitor and report changes in neurological status  - Monitor vital signs such as temperature, blood pressure, glucose, and any other labs ordered   - Initiate measures to prevent increased intracranial pressure  - Monitor for seizure activity and implement precautions if appropriate      Outcome: Progressing  Goal: Remains free of injury related to seizures activity  Description: INTERVENTIONS  - Maintain airway, patient safety  and administer oxygen as ordered  - Monitor patient for seizure activity, document and report duration and description of seizure to physician/advanced practitioner  - If seizure occurs,  ensure patient safety during seizure  - Reorient patient post seizure  - Seizure pads on all 4 side rails  - Instruct patient/family to notify RN of any seizure activity including if an aura is experienced  - Instruct patient/family to call for assistance with activity based on nursing assessment  - Administer anti-seizure medications if ordered    Outcome: Progressing  Goal: Achieves maximal functionality and self care  Description: INTERVENTIONS  - Monitor swallowing and airway patency with patient fatigue and changes in neurological status  - Encourage and assist patient to increase activity and self care.   - Encourage visually impaired, hearing impaired and aphasic patients to use assistive/communication devices  Outcome: Progressing     Problem:  CARDIOVASCULAR - ADULT  Goal: Maintains optimal cardiac output and hemodynamic stability  Description: INTERVENTIONS:  - Monitor I/O, vital signs and rhythm  - Monitor for S/S and trends of decreased cardiac output  - Administer and titrate ordered vasoactive medications to optimize hemodynamic stability  - Assess quality of pulses, skin color and temperature  - Assess for signs of decreased coronary artery perfusion  - Instruct patient to report change in severity of symptoms  Outcome: Progressing  Goal: Absence of cardiac dysrhythmias or at baseline rhythm  Description: INTERVENTIONS:  - Continuous cardiac monitoring, vital signs, obtain 12 lead EKG if ordered  - Administer antiarrhythmic and heart rate control medications as ordered  - Monitor electrolytes and administer replacement therapy as ordered  Outcome: Progressing     Problem: RESPIRATORY - ADULT  Goal: Achieves optimal ventilation and oxygenation  Description: INTERVENTIONS:  - Assess for changes in respiratory status  - Assess for changes in mentation and behavior  - Position to facilitate oxygenation and minimize respiratory effort  - Oxygen administered by appropriate delivery if ordered  - Initiate smoking cessation education as indicated  - Encourage broncho-pulmonary hygiene including cough, deep breathe, Incentive Spirometry  - Assess the need for suctioning and aspirate as needed  - Assess and instruct to report SOB or any respiratory difficulty  - Respiratory Therapy support as indicated  Outcome: Progressing     Problem: GASTROINTESTINAL - ADULT  Goal: Minimal or absence of nausea and/or vomiting  Description: INTERVENTIONS:  - Administer IV fluids if ordered to ensure adequate hydration  - Maintain NPO status until nausea and vomiting are resolved  - Nasogastric tube if ordered  - Administer ordered antiemetic medications as needed  - Provide nonpharmacologic comfort measures as appropriate  - Advance diet as tolerated, if ordered  - Consider  nutrition services referral to assist patient with adequate nutrition and appropriate food choices  Outcome: Progressing  Goal: Maintains or returns to baseline bowel function  Description: INTERVENTIONS:  - Assess bowel function  - Encourage oral fluids to ensure adequate hydration  - Administer IV fluids if ordered to ensure adequate hydration  - Administer ordered medications as needed  - Encourage mobilization and activity  - Consider nutritional services referral to assist patient with adequate nutrition and appropriate food choices  Outcome: Progressing  Goal: Maintains adequate nutritional intake  Description: INTERVENTIONS:  - Monitor percentage of each meal consumed  - Identify factors contributing to decreased intake, treat as appropriate  - Assist with meals as needed  - Monitor I&O, weight, and lab values if indicated  - Obtain nutrition services referral as needed  Outcome: Progressing  Goal: Establish and maintain optimal ostomy function  Description: INTERVENTIONS:  - Assess bowel function  - Encourage oral fluids to ensure adequate hydration  - Administer IV fluids if ordered to ensure adequate hydration   - Administer ordered medications as needed  - Encourage mobilization and activity  - Nutrition services referral to assist patient with appropriate food choices  - Assess stoma site  - Consider wound care consult   Outcome: Progressing  Goal: Oral mucous membranes remain intact  Description: INTERVENTIONS  - Assess oral mucosa and hygiene practices  - Implement preventative oral hygiene regimen  - Implement oral medicated treatments as ordered  - Initiate Nutrition services referral as needed  Outcome: Progressing     Problem: GENITOURINARY - ADULT  Goal: Maintains or returns to baseline urinary function  Description: INTERVENTIONS:  - Assess urinary function  - Encourage oral fluids to ensure adequate hydration if ordered  - Administer IV fluids as ordered to ensure adequate hydration  -  Administer ordered medications as needed  - Offer frequent toileting  - Follow urinary retention protocol if ordered  Outcome: Progressing  Goal: Absence of urinary retention  Description: INTERVENTIONS:  - Assess patient’s ability to void and empty bladder  - Monitor I/O  - Bladder scan as needed  - Discuss with physician/AP medications to alleviate retention as needed  - Discuss catheterization for long term situations as appropriate  Outcome: Progressing  Goal: Urinary catheter remains patent  Description: INTERVENTIONS:  - Assess patency of urinary catheter  - If patient has a chronic peace, consider changing catheter if non-functioning  - Follow guidelines for intermittent irrigation of non-functioning urinary catheter  Outcome: Progressing     Problem: METABOLIC, FLUID AND ELECTROLYTES - ADULT  Goal: Electrolytes maintained within normal limits  Description: INTERVENTIONS:  - Monitor labs and assess patient for signs and symptoms of electrolyte imbalances  - Administer electrolyte replacement as ordered  - Monitor response to electrolyte replacements, including repeat lab results as appropriate  - Instruct patient on fluid and nutrition as appropriate  Outcome: Progressing  Goal: Fluid balance maintained  Description: INTERVENTIONS:  - Monitor labs   - Monitor I/O and WT  - Instruct patient on fluid and nutrition as appropriate  - Assess for signs & symptoms of volume excess or deficit  Outcome: Progressing  Goal: Glucose maintained within target range  Description: INTERVENTIONS:  - Monitor Blood Glucose as ordered  - Assess for signs and symptoms of hyperglycemia and hypoglycemia  - Administer ordered medications to maintain glucose within target range  - Assess nutritional intake and initiate nutrition service referral as needed  Outcome: Progressing     Problem: HEMATOLOGIC - ADULT  Goal: Maintains hematologic stability  Description: INTERVENTIONS  - Assess for signs and symptoms of bleeding or  hemorrhage  - Monitor labs  - Administer supportive blood products/factors as ordered and appropriate  Outcome: Progressing     Problem: MUSCULOSKELETAL - ADULT  Goal: Maintain or return mobility to safest level of function  Description: INTERVENTIONS:  - Assess patient's ability to carry out ADLs; assess patient's baseline for ADL function and identify physical deficits which impact ability to perform ADLs (bathing, care of mouth/teeth, toileting, grooming, dressing, etc.)  - Assess/evaluate cause of self-care deficits   - Assess range of motion  - Assess patient's mobility  - Assess patient's need for assistive devices and provide as appropriate  - Encourage maximum independence but intervene and supervise when necessary  - Involve family in performance of ADLs  - Assess for home care needs following discharge   - Consider OT consult to assist with ADL evaluation and planning for discharge  - Provide patient education as appropriate  Outcome: Progressing  Goal: Maintain proper alignment of affected body part  Description: INTERVENTIONS:  - Support, maintain and protect limb and body alignment  - Provide patient/ family with appropriate education  Outcome: Progressing     Problem: Nutrition/Hydration-ADULT  Goal: Nutrient/Hydration intake appropriate for improving, restoring or maintaining nutritional needs  Description: Monitor and assess patient's nutrition/hydration status for malnutrition. Collaborate with interdisciplinary team and initiate plan and interventions as ordered.  Monitor patient's weight and dietary intake as ordered or per policy. Utilize nutrition screening tool and intervene as necessary. Determine patient's food preferences and provide high-protein, high-caloric foods as appropriate.     INTERVENTIONS:  - Monitor oral intake, urinary output, labs, and treatment plans  - Assess nutrition and hydration status and recommend course of action  - Evaluate amount of meals eaten  - Assist patient with  eating if necessary   - Allow adequate time for meals  - Recommend/ encourage appropriate diets, oral nutritional supplements, and vitamin/mineral supplements  - Order, calculate, and assess calorie counts as needed  - Recommend, monitor, and adjust tube feedings and TPN/PPN based on assessed needs  - Assess need for intravenous fluids  - Provide specific nutrition/hydration education as appropriate  - Include patient/family/caregiver in decisions related to nutrition  Outcome: Progressing     Problem: Prexisting or High Potential for Compromised Skin Integrity  Goal: Skin integrity is maintained or improved  Description: INTERVENTIONS:  - Identify patients at risk for skin breakdown  - Assess and monitor skin integrity  - Assess and monitor nutrition and hydration status  - Monitor labs   - Assess for incontinence   - Turn and reposition patient  - Assist with mobility/ambulation  - Relieve pressure over bony prominences  - Avoid friction and shearing  - Provide appropriate hygiene as needed including keeping skin clean and dry  - Evaluate need for skin moisturizer/barrier cream  - Collaborate with interdisciplinary team   - Patient/family teaching  - Consider wound care consult   Outcome: Progressing     Problem: SAFETY,RESTRAINT: NV/NON-SELF DESTRUCTIVE BEHAVIOR  Goal: Remains free of harm/injury (restraint for non violent/non self-detsructive behavior)  Description: INTERVENTIONS:  - Instruct patient/family regarding restraint use   - Assess and monitor physiologic and psychological status   - Provide interventions and comfort measures to meet assessed patient needs   - Identify and implement measures to help patient regain control  - Assess readiness for release of restraint   Outcome: Progressing  Goal: Returns to optimal restraint-free functioning  Description: INTERVENTIONS:  - Assess the patient's behavior and symptoms that indicate continued need for restraint  - Identify and implement measures to help  patient regain control  - Assess readiness for release of restraint   Outcome: Progressing

## 2024-11-18 NOTE — ASSESSMENT & PLAN NOTE
Lab Results   Component Value Date    EGFR 30 11/18/2024    EGFR 36 11/17/2024    EGFR 37 11/16/2024    CREATININE 1.72 (H) 11/18/2024    CREATININE 1.48 (H) 11/17/2024    CREATININE 1.47 (H) 11/16/2024   Baseline creatinine 1.7-1.9.  Creatinine currently at baseline and stable.  Dose adjust antibiotic car wheels as needed for creatinine clearance.  Monitor creatinine daily

## 2024-11-18 NOTE — PROGRESS NOTES
Progress Note - Infectious Disease   Name: Adelina Soto 64 y.o. female I MRN: [id number]  Unit/Bed#: MICU 01 I Date of Admission: 11/12/2024   Date of Service: 11/18/2024 I Hospital Day: 6    Assessment & Plan  Seizure (HCC)   Patient presented with acute encephalopathy and seizure-like activity.  Video EEG concerning for status epilepticus.  MRI brain without contrast was limited by motion but showed restricted diffusion in the left temporal lobe which could be postictal versus infection.  Status post LP 11/12 and CSF studies unremarkable, not consistent with infection with 2 WBCs.  Protein mildly elevated which could be due to seizure activity itself.  ME panel negative, stand alone HSV PCR also negative.  However with new onset seizures and possible abnormality of the temporal lobe HSV remains a possibility as PCR may be negative and CSF bland and early disease.  CSF culture with no growth.  The patient has grown E. coli and ESBL Proteus in urine culture but in the absence of prior systemic signs of infection or localizing symptoms UTI, doubt this is causing status epilepticus. HIV negative. Fever and leukocytosis improving.              -For now continue IV acyclovir 10 mg/kg every 12 hours              -MRI brain with contrast when able                  -repeat LP pending to reassess CSF; if HSV remains negative and CSF bland then stop acyclovir    -AEDs per neurology  Bacteriuria  UA with pyuria and urine culture growing E. coli and ESBL Proteus.  No symptoms of infection prior to admission.  Unlikely cause of status epilepticus however in absence of clear cause of seizure activity treated for possible cystitis with 3 days zosyn   -monitor off antibiotics   Shock (HCC)  May be multifactorial related to possible infection, sedating medications.  Patient remains on vasopressor support but weaning   -Hemodynamic support per critical care team   -Antimicrobials as above  Acute hypoxic respiratory failure  (Formerly [facility])  Patient intubated for airway protection.  Status post bronchoscopy 11/13 for mucous plugging and thick left-sided secretions seen.  Chest x-ray not consistent with pneumonia.  BAL culture shows growth of mixed respiratory viktoriya.   -Ventilator management per critical care team  Morbid obesity with BMI of 50.0-59.9, adult (Formerly [facility])  Affects antimicrobial dosing  CKD (chronic kidney disease)  Lab Results   Component Value Date    EGFR 30 11/18/2024    EGFR 36 11/17/2024    EGFR 37 11/16/2024    CREATININE 1.72 (H) 11/18/2024    CREATININE 1.48 (H) 11/17/2024    CREATININE 1.47 (H) 11/16/2024   Baseline creatinine 1.7-1.9.  Creatinine currently at baseline and stable.  Dose adjust antibiotic car wheels as needed for creatinine clearance.  Monitor creatinine daily  Type 2 diabetes mellitus with hyperglycemia, unspecified whether long term insulin use (Formerly [facility])  Lab Results   Component Value Date    HGBA1C 5.7 (H) 11/13/2024   Diabetes well-controlled.  Glycemic management per primary team    Above management plan to continue IV acyclovir discussed with the critical care resident.  Discussed with the patient's partner at bedside.  ID will follow.    Antibiotics:  Acyclovir day 6    Subjective   The patient remains intubated, sedated on video EEG. Fever and leukocytosis improved. Pending repeat LP today.    Temp:  [97.3 °F (36.3 °C)-99.1 °F (37.3 °C)] 97.7 °F (36.5 °C)  HR:  [101-113] 104  BP: ()/(55-60) 100/60  Resp:  [16] 16  SpO2:  [93 %-99 %] 96 %  O2 Device: Ventilator  FiO2 (%):  [55] 55    General: Intubated, sedated  Head: EEG leads in place  Mouth: ET tube in place  Neck: trachea midline   CV: RRR, no murmurs   Lungs: clear to auscultation bilaterally   Abdomen: no distension   Skin: no rashes, cellulitis.  Intertrigo in her skin folds  Neuro: Intubated, sedated    Lab Results: I have reviewed the following results:  Results from last 7 days   Lab Units 11/18/24  0447 11/17/24  0517 11/16/24  0503   WBC  Thousand/uL 6.04 7.99 10.83*   HEMOGLOBIN g/dL 9.5* 9.8* 10.2*   PLATELETS Thousands/uL 139* 172 208     Results from last 7 days   Lab Units 11/18/24  0447 11/17/24  0517 11/16/24  0503 11/15/24  0524   SODIUM mmol/L 138 140 137 138   POTASSIUM mmol/L 3.9 4.2 3.8 3.8   CHLORIDE mmol/L 103 108 110* 108   CO2 mmol/L 27 24 20* 22   BUN mg/dL 18 16 15 14   CREATININE mg/dL 1.72* 1.48* 1.47* 1.46*   EGFR ml/min/1.73sq m 30 36 37 37   CALCIUM mg/dL 8.0* 7.8* 7.3* 7.4*   AST U/L  --  15 17 16   ALT U/L  --  6* 6* 6*   ALK PHOS U/L  --  96 92 95   ALBUMIN g/dL  --  2.4* 2.3* 2.5*     Results from last 7 days   Lab Units 11/12/24  2353 11/12/24  2238 11/12/24  2228 11/12/24  2039   BLOOD CULTURE   --  No Growth After 5 Days. No Growth After 5 Days.  --    GRAM STAIN RESULT  1+ Gram negative rods*  1+ Gram positive cocci in clusters*  1+ Polys*  --   --   --    URINE CULTURE   --   --   --  >100,000 cfu/ml Escherichia coli*  >100,000 cfu/ml Proteus mirabilis ESBL*     Results from last 7 days   Lab Units 11/12/24  2132   PROCALCITONIN ng/ml 0.36*       I have personally reviewed pertinent imaging reports and images in PACS. MRI brain without contrast shows left temporal lobe restricted diffusion.

## 2024-11-18 NOTE — NUTRITION
Nutrition Follow-Up:       11/18/24 2915   Recommendations/Interventions   Interventions/Recommendations Continue EN as ordered   Recommendations to Provider Current EN regimen (Vital 1.2 AF @55mL/hr x22 hrs/day, hold for 1 hour before and 1 hour after levothyroxine administration) remains appropriate to meet estimated calorie/protein needs at this time. Additional free water flushes per critical care; suggest at least 30mL q4 hrs to help maintain tube patency.

## 2024-11-18 NOTE — RESPIRATORY THERAPY NOTE
11/18/24 0308   Respiratory Assessment   Resp Comments No adverse events overnight.   Vent Information   Vent type Kathleen G5   Kathleen Vent Mode (S)CMV   $ Vent Daily Charge-Subsequent Yes   $ Pulse Oximetry Spot Check Charge Completed   (S)CMV Settings   Resp Rate (BPM) 16 BPM   VT (mL) 420 mL   FIO2 (%) 55 %   PEEP (cmH2O) 8 cmH2O   Insp Time (%)   (1 sec)   Flow Trigger (LPM) 5   Humidification Heater   Heater Temperature (Set) 98.6 °F (37 °C)   (S)CMV Actuals   Resp Rate (BPM) 16 BPM   VT (mL) 407   MV 7   MAP (cmH2O) 12 cmH2O   Peak Pressure (cmH2O) 27 cmH2O   (S)CMV Alarms   High Peak Pressure (cmH2O) 40   Low Pressure (cmH2O) 5 cm H2O   High Resp Rate (BPM) 40 BPM   Low Resp Rate (BPM) 8 BPM   High MV (L/min) 20 L/min   Low MV (L/min) 4 L/min   Apnea Time (s) 20 S   Maintenance   Water bag changed No   Circuit changed No

## 2024-11-18 NOTE — ASSESSMENT & PLAN NOTE
May be multifactorial related to possible infection, sedating medications.  Patient remains on vasopressor support but weaning   -Hemodynamic support per critical care team   -Antimicrobials as above

## 2024-11-18 NOTE — ASSESSMENT & PLAN NOTE
UA with pyuria and urine culture growing E. coli and ESBL Proteus.  No symptoms of infection prior to admission.  Unlikely cause of status epilepticus however in absence of clear cause of seizure activity treated for possible cystitis with 3 days zosyn   -monitor off antibiotics

## 2024-11-18 NOTE — PROGRESS NOTES
Progress Note - Neurology   Name: Adelina Soto 64 y.o. female I MRN: [id number]  Unit/Bed#: [facility] 01 I Date of Admission: 11/12/2024   Date of Service: 11/18/2024 I Hospital Day: 6    Assessment & Plan  Seizure (HCC)  Adelina Soto is a 64 year old woman with PMHx including HTN, HLD, hypothyroidism, A fib on Warfarin, DM, GERD, anxiety who presented to the hospital for evaluation of altered mental status. Stroke alert called due to gaze deviation and altered mentation. Pt was noted to have two witnessed seizures prior to arrival, and received 6 mg Versed prior to arrival. Continued to have left gaze preference for ED, with lack of command following although was moving her LUE spontaneously. CT head and CTA H/N with no findings that could explain her presentation. After return from CT scan, appeared to have some improvement in her exam but still off from her baseline on discussion with sister at bedside. Concern for status epilepticus with multiple seizures without return to baseline as of yet. Otherwise compliant with Coumadin, with INR 2.42.     VEEG monitoring report of 08:00 11/13: 2:20 this morning and around 02:40 started to have repetitive left frontal seizures, every 10 seconds at times. She was started on sedation again shortly after 03:00 and seizures stopped at that point. EEG is now very low amplitude and slow, with frequent left temporal sharps.     VEEG 24 hrs AM 11:17: EEG is still discontinuous and mostly polymorphic delta activities. Still with some left frontal and left temporal sharps, but no seizures.    MRI: Limited by motion. Mild restricted diffusion with abnormal FLAIR signal in the mesial left temporal lobe could be postictal. Recommend correlation with CSF to exclude infection such as HSV. Abnormal signal in the right thalamus of uncertain etiology.     Impression: status epilepticus, which required multiple AED & sedation medications to control in setting of no history of seizures &  currently unknown etiology    Plan:   - Await CSF results  - pheny stopped   - VPA up to 500mg q6h  - keppra up to 750mg q12h  - Seizure precautions  - Will need DMV report  - Therapy when able  - Continue home Warfarin  - Systolic BP goal less than 180  - Rest of care per primary  - Seizure precautions outpatient: no driving, no operating heavy machinery, no swimming alone, no climbing, no baths only showers, no cooking alone, or any activity that would put them at increased risk of harm if they were to have a seizure.    Recommendations for outpatient neurological follow up have yet to be determined.  I have discussed with Dr. Coyne the above plan to treat pt. She agrees with the plan.  Please contact the [id number] role for the Neurology service with any questions/concerns.    Subjective   Intubated & unresponsive w/o sedation    Review of Systems   Unable to perform ROS: Intubated       Objective :  Temp:  [97.3 °F (36.3 °C)-99.1 °F (37.3 °C)] 97.7 °F (36.5 °C)  HR:  [102-113] 104  BP: ()/(55-59) 102/55  Resp:  [16] 16  SpO2:  [93 %-99 %] 96 %  O2 Device: Ventilator  FiO2 (%):  [55] 55    Physical Exam  Vitals and nursing note reviewed.   Constitutional:       General: She is not in acute distress.     Appearance: She is not ill-appearing, toxic-appearing or diaphoretic.   HENT:      Head: Normocephalic.      Right Ear: External ear normal.      Left Ear: External ear normal.      Nose: Nose normal.   Eyes:      General: No scleral icterus.        Right eye: No discharge.         Left eye: No discharge.      Conjunctiva/sclera: Conjunctivae normal.     Neurological Exam  Mental Status   Patient is nonverbal.  Unresponsive not on sedation.    Cranial Nerves  Intubated.    Motor  Normal muscle bulk throughout. No fasciculations present. Normal muscle tone. No abnormal involuntary movements.  No movement to pain .    Sensory  Intubated.    Reflexes  Deep tendon reflexes are 2+ and symmetric except as  noted.  Symmetric.    Coordination    Intubated.    Gait    Intubated.        Lab Results: I have reviewed the following results:    VTE Pharmacologic Prophylaxis: VTE covered by:    None       Administrative Statements   I have spent a total time of 40 minutes in caring for this patient on the day of the visit/encounter including Counseling / Coordination of care, Documenting in the medical record, Reviewing / ordering tests, medicine, procedures  , Obtaining or reviewing history  , and Communicating with other healthcare professionals .

## 2024-11-18 NOTE — ASSESSMENT & PLAN NOTE
Adelina Soto is a 64 year old woman with PMHx including HTN, HLD, hypothyroidism, A fib on Warfarin, DM, GERD, anxiety who presented to the hospital for evaluation of altered mental status. Stroke alert called due to gaze deviation and altered mentation. Pt was noted to have two witnessed seizures prior to arrival, and received 6 mg Versed prior to arrival. Continued to have left gaze preference for ED, with lack of command following although was moving her LUE spontaneously. CT head and CTA H/N with no findings that could explain her presentation. After return from CT scan, appeared to have some improvement in her exam but still off from her baseline on discussion with sister at bedside. Concern for status epilepticus with multiple seizures without return to baseline as of yet. Otherwise compliant with Coumadin, with INR 2.42.     VEEG monitoring report of 08:00 11/13: 2:20 this morning and around 02:40 started to have repetitive left frontal seizures, every 10 seconds at times. She was started on sedation again shortly after 03:00 and seizures stopped at that point. EEG is now very low amplitude and slow, with frequent left temporal sharps.     VEEG 24 hrs AM 11:17: EEG is still discontinuous and mostly polymorphic delta activities. Still with some left frontal and left temporal sharps, but no seizures.    MRI: Limited by motion. Mild restricted diffusion with abnormal FLAIR signal in the mesial left temporal lobe could be postictal. Recommend correlation with CSF to exclude infection such as HSV. Abnormal signal in the right thalamus of uncertain etiology.     Impression: status epilepticus, which required multiple AED & sedation medications to control in setting of no history of seizures & currently unknown etiology    Plan:   - Await CSF results  - pheny stopped   - VPA up to 500mg q6h  - keppra up to 750mg q12h  - Seizure precautions  - Will need DMV report  - Therapy when able  - Continue home Warfarin  -  Systolic BP goal less than 180  - Rest of care per primary  - Seizure precautions outpatient: no driving, no operating heavy machinery, no swimming alone, no climbing, no baths only showers, no cooking alone, or any activity that would put them at increased risk of harm if they were to have a seizure.

## 2024-11-18 NOTE — PLAN OF CARE
Problem: PAIN - ADULT  Goal: Verbalizes/displays adequate comfort level or baseline comfort level  Description: Interventions:  - Encourage patient to monitor pain and request assistance  - Assess pain using appropriate pain scale  - Administer analgesics based on type and severity of pain and evaluate response  - Implement non-pharmacological measures as appropriate and evaluate response  - Consider cultural and social influences on pain and pain management  - Notify physician/advanced practitioner if interventions unsuccessful or patient reports new pain  Outcome: Progressing     Problem: INFECTION - ADULT  Goal: Absence or prevention of progression during hospitalization  Description: INTERVENTIONS:  - Assess and monitor for signs and symptoms of infection  - Monitor lab/diagnostic results  - Monitor all insertion sites, i.e. indwelling lines, tubes, and drains  - Monitor endotracheal if appropriate and nasal secretions for changes in amount and color  - [place] appropriate cooling/warming therapies per order  - Administer medications as ordered  - Instruct and encourage patient and family to use good hand hygiene technique  - Identify and instruct in appropriate isolation precautions for identified infection/condition  Outcome: Progressing  Goal: Absence of fever/infection during neutropenic period  Description: INTERVENTIONS:  - Monitor WBC    Outcome: Progressing     Problem: SAFETY ADULT  Goal: Patient will remain free of falls  Description: INTERVENTIONS:  - Educate patient/family on patient safety including physical limitations  - Instruct patient to call for assistance with activity   - Consult OT/PT to assist with strengthening/mobility   - Keep Call bell within reach  - Keep bed low and locked with side rails adjusted as appropriate  - Keep care items and personal belongings within reach  - Initiate and maintain comfort rounds  - Make Fall Risk Sign visible to staff  - Offer Toileting every  Hours,  in advance of need  - Initiate/Maintain alarm  - Obtain necessary fall risk management equipment:   - Apply yellow socks and bracelet for high fall risk patients  - Consider moving patient to room near nurses station  Outcome: Progressing  Goal: Maintain or return to baseline ADL function  Description: INTERVENTIONS:  -  Assess patient's ability to carry out ADLs; assess patient's baseline for ADL function and identify physical deficits which impact ability to perform ADLs (bathing, care of mouth/teeth, toileting, grooming, dressing, etc.)  - Assess/evaluate cause of self-care deficits   - Assess range of motion  - Assess patient's mobility; develop plan if impaired  - Assess patient's need for assistive devices and provide as appropriate  - Encourage maximum independence but intervene and supervise when necessary  - Involve family in performance of ADLs  - Assess for home care needs following discharge   - Consider OT consult to assist with ADL evaluation and planning for discharge  - Provide patient education as appropriate  Outcome: Progressing  Goal: Maintains/Returns to pre admission functional level  Description: INTERVENTIONS:  - Perform AM-PAC 6 Click Basic Mobility/ Daily Activity assessment daily.  - Set and communicate daily mobility goal to care team and patient/family/caregiver.   - Collaborate with rehabilitation services on mobility goals if consulted  - Perform Range of Motion  times a day.  - Reposition patient every  hours.  - Dangle patient  times a day  - Stand patient  times a day  - Ambulate patient  times a day  - Out of bed to chair  times a day   - Out of bed for meals  times a day  - Out of bed for toileting  - Record patient progress and toleration of activity level   Outcome: Progressing     Problem: DISCHARGE PLANNING  Goal: Discharge to home or other facility with appropriate resources  Description: INTERVENTIONS:  - Identify barriers to discharge w/patient and caregiver  - Arrange for  needed discharge resources and transportation as appropriate  - Identify discharge learning needs (meds, wound care, etc.)  - Arrange for interpretive services to assist at discharge as needed  - Refer to Case Management Department for coordinating discharge planning if the patient needs post-hospital services based on physician/advanced practitioner order or complex needs related to functional status, cognitive ability, or social support system  Outcome: Progressing     Problem: Knowledge Deficit  Goal: Patient/family/caregiver demonstrates understanding of disease process, treatment plan, medications, and discharge instructions  Description: Complete learning assessment and assess knowledge base.  Interventions:  - Provide teaching at level of understanding  - Provide teaching via preferred learning methods  Outcome: Progressing     Problem: NEUROSENSORY - ADULT  Goal: Achieves stable or improved neurological status  Description: INTERVENTIONS  - Monitor and report changes in neurological status  - Monitor vital signs such as temperature, blood pressure, glucose, and any other labs ordered   - Initiate measures to prevent increased intracranial pressure  - Monitor for seizure activity and implement precautions if appropriate      Outcome: Progressing  Goal: Remains free of injury related to seizures activity  Description: INTERVENTIONS  - Maintain airway, patient safety  and administer oxygen as ordered  - Monitor patient for seizure activity, document and report duration and description of seizure to physician/advanced practitioner  - If seizure occurs,  ensure patient safety during seizure  - Reorient patient post seizure  - Seizure pads on all 4 side rails  - Instruct patient/family to notify RN of any seizure activity including if an aura is experienced  - Instruct patient/family to call for assistance with activity based on nursing assessment  - Administer anti-seizure medications if ordered    Outcome:  Progressing  Goal: Achieves maximal functionality and self care  Description: INTERVENTIONS  - Monitor swallowing and airway patency with patient fatigue and changes in neurological status  - Encourage and assist patient to increase activity and self care.   - Encourage visually impaired, hearing impaired and aphasic patients to use assistive/communication devices  Outcome: Progressing     Problem: CARDIOVASCULAR - ADULT  Goal: Maintains optimal cardiac output and hemodynamic stability  Description: INTERVENTIONS:  - Monitor I/O, vital signs and rhythm  - Monitor for S/S and trends of decreased cardiac output  - Administer and titrate ordered vasoactive medications to optimize hemodynamic stability  - Assess quality of pulses, skin color and temperature  - Assess for signs of decreased coronary artery perfusion  - Instruct patient to report change in severity of symptoms  Outcome: Progressing  Goal: Absence of cardiac dysrhythmias or at baseline rhythm  Description: INTERVENTIONS:  - Continuous cardiac monitoring, vital signs, obtain 12 lead EKG if ordered  - Administer antiarrhythmic and heart rate control medications as ordered  - Monitor electrolytes and administer replacement therapy as ordered  Outcome: Progressing     Problem: RESPIRATORY - ADULT  Goal: Achieves optimal ventilation and oxygenation  Description: INTERVENTIONS:  - Assess for changes in respiratory status  - Assess for changes in mentation and behavior  - Position to facilitate oxygenation and minimize respiratory effort  - Oxygen administered by appropriate delivery if ordered  - Initiate smoking cessation education as indicated  - Encourage broncho-pulmonary hygiene including cough, deep breathe, Incentive Spirometry  - Assess the need for suctioning and aspirate as needed  - Assess and instruct to report SOB or any respiratory difficulty  - Respiratory Therapy support as indicated  Outcome: Progressing     Problem: GASTROINTESTINAL -  ADULT  Goal: Minimal or absence of nausea and/or vomiting  Description: INTERVENTIONS:  - Administer IV fluids if ordered to ensure adequate hydration  - Maintain NPO status until nausea and vomiting are resolved  - Nasogastric tube if ordered  - Administer ordered antiemetic medications as needed  - Provide nonpharmacologic comfort measures as appropriate  - Advance diet as tolerated, if ordered  - Consider nutrition services referral to assist patient with adequate nutrition and appropriate food choices  Outcome: Progressing  Goal: Maintains or returns to baseline bowel function  Description: INTERVENTIONS:  - Assess bowel function  - Encourage oral fluids to ensure adequate hydration  - Administer IV fluids if ordered to ensure adequate hydration  - Administer ordered medications as needed  - Encourage mobilization and activity  - Consider nutritional services referral to assist patient with adequate nutrition and appropriate food choices  Outcome: Progressing  Goal: Maintains adequate nutritional intake  Description: INTERVENTIONS:  - Monitor percentage of each meal consumed  - Identify factors contributing to decreased intake, treat as appropriate  - Assist with meals as needed  - Monitor I&O, weight, and lab values if indicated  - Obtain nutrition services referral as needed  Outcome: Progressing  Goal: Establish and maintain optimal ostomy function  Description: INTERVENTIONS:  - Assess bowel function  - Encourage oral fluids to ensure adequate hydration  - Administer IV fluids if ordered to ensure adequate hydration   - Administer ordered medications as needed  - Encourage mobilization and activity  - Nutrition services referral to assist patient with appropriate food choices  - Assess stoma site  - Consider wound care consult   Outcome: Progressing  Goal: Oral mucous membranes remain intact  Description: INTERVENTIONS  - Assess oral mucosa and hygiene practices  - Implement preventative oral hygiene  regimen  - Implement oral medicated treatments as ordered  - Initiate Nutrition services referral as needed  Outcome: Progressing     Problem: GENITOURINARY - ADULT  Goal: Maintains or returns to baseline urinary function  Description: INTERVENTIONS:  - Assess urinary function  - Encourage oral fluids to ensure adequate hydration if ordered  - Administer IV fluids as ordered to ensure adequate hydration  - Administer ordered medications as needed  - Offer frequent toileting  - Follow urinary retention protocol if ordered  Outcome: Progressing  Goal: Absence of urinary retention  Description: INTERVENTIONS:  - Assess patient’s ability to void and empty bladder  - Monitor I/O  - Bladder scan as needed  - Discuss with physician/AP medications to alleviate retention as needed  - Discuss catheterization for long term situations as appropriate  Outcome: Progressing  Goal: Urinary catheter remains patent  Description: INTERVENTIONS:  - Assess patency of urinary catheter  - If patient has a chronic peace, consider changing catheter if non-functioning  - Follow guidelines for intermittent irrigation of non-functioning urinary catheter  Outcome: Progressing     Problem: METABOLIC, FLUID AND ELECTROLYTES - ADULT  Goal: Electrolytes maintained within normal limits  Description: INTERVENTIONS:  - Monitor labs and assess patient for signs and symptoms of electrolyte imbalances  - Administer electrolyte replacement as ordered  - Monitor response to electrolyte replacements, including repeat lab results as appropriate  - Instruct patient on fluid and nutrition as appropriate  Outcome: Progressing  Goal: Fluid balance maintained  Description: INTERVENTIONS:  - Monitor labs   - Monitor I/O and WT  - Instruct patient on fluid and nutrition as appropriate  - Assess for signs & symptoms of volume excess or deficit  Outcome: Progressing  Goal: Glucose maintained within target range  Description: INTERVENTIONS:  - Monitor Blood Glucose as  ordered  - Assess for signs and symptoms of hyperglycemia and hypoglycemia  - Administer ordered medications to maintain glucose within target range  - Assess nutritional intake and initiate nutrition service referral as needed  Outcome: Progressing     Problem: SKIN/TISSUE INTEGRITY - ADULT  Goal: Skin Integrity remains intact(Skin Breakdown Prevention)  Description: Assess:  -Perform [place] assessment every   -Clean and moisturize skin every   -Inspect skin when repositioning, toileting, and assisting with ADLS  -Assess under medical devices such as  every   -Assess extremities for adequate circulation and sensation     Bed Management:  -Have minimal linens on bed & keep smooth, unwrinkled  -Change linens as needed when moist or perspiring  -Avoid sitting or lying in one position for more than  hours while in bed  -Keep HOB at degrees     Toileting:  -Offer bedside commode  -Assess for incontinence every   -Use incontinent care products after each incontinent episode such as     Activity:  -Mobilize patient  times a day  -Encourage activity and walks on unit  -Encourage or provide ROM exercises   -Turn and reposition patient every  Hours  -Use appropriate equipment to lift or move patient in bed  -Instruct/ Assist with weight shifting every  when out of bed in chair  -Consider limitation of chair time  hour intervals    Skin Care:  -Avoid use of baby powder, tape, friction and shearing, hot water or constrictive clothing  -Relieve pressure over bony prominences using   -Do not massage red bony areas    Next Steps:  -Teach patient strategies to minimize risks such as    -Consider consults to  interdisciplinary teams such as   Outcome: Progressing  Goal: Incision(s), wounds(s) or drain site(s) healing without S/S of infection  Description: INTERVENTIONS  - Assess and document dressing, incision, wound bed, drain sites and surrounding tissue  - Provide patient and family education  - Perform skin care/dressing  changes every   Outcome: Progressing  Goal: Pressure injury heals and does not worsen  Description: Interventions:  - Implement low air loss mattress or specialty surface (Criteria met)  - Apply silicone foam dressing  - Instruct/assist with weight shifting every  minutes when in chair   - Limit chair time to  hour intervals  - Use special pressure reducing interventions such as  when in chair   - Apply fecal or urinary incontinence containment device   - Perform passive or active ROM every   - Turn and reposition patient & offload bony prominences every  hours   - Utilize friction reducing device or surface for transfers   - Consider consults to  interdisciplinary teams such as   - Use incontinent care products after each incontinent episode such as   - Consider nutrition services referral as needed  Outcome: Progressing     Problem: HEMATOLOGIC - ADULT  Goal: Maintains hematologic stability  Description: INTERVENTIONS  - Assess for signs and symptoms of bleeding or hemorrhage  - Monitor labs  - Administer supportive blood products/factors as ordered and appropriate  Outcome: Progressing     Problem: MUSCULOSKELETAL - ADULT  Goal: Maintain or return mobility to safest level of function  Description: INTERVENTIONS:  - Assess patient's ability to carry out ADLs; assess patient's baseline for ADL function and identify physical deficits which impact ability to perform ADLs (bathing, care of mouth/teeth, toileting, grooming, dressing, etc.)  - Assess/evaluate cause of self-care deficits   - Assess range of motion  - Assess patient's mobility  - Assess patient's need for assistive devices and provide as appropriate  - Encourage maximum independence but intervene and supervise when necessary  - Involve family in performance of ADLs  - Assess for home care needs following discharge   - Consider OT consult to assist with ADL evaluation and planning for discharge  - Provide patient education as appropriate  Outcome:  Progressing  Goal: Maintain proper alignment of affected body part  Description: INTERVENTIONS:  - Support, maintain and protect limb and body alignment  - Provide patient/ family with appropriate education  Outcome: Progressing     Problem: Nutrition/Hydration-ADULT  Goal: Nutrient/Hydration intake appropriate for improving, restoring or maintaining nutritional needs  Description: Monitor and assess patient's nutrition/hydration status for malnutrition. Collaborate with interdisciplinary team and initiate plan and interventions as ordered.  Monitor patient's weight and dietary intake as ordered or per policy. Utilize nutrition screening tool and intervene as necessary. Determine patient's food preferences and provide high-protein, high-caloric foods as appropriate.     INTERVENTIONS:  - Monitor oral intake, urinary output, labs, and treatment plans  - Assess nutrition and hydration status and recommend course of action  - Evaluate amount of meals eaten  - Assist patient with eating if necessary   - Allow adequate time for meals  - Recommend/ encourage appropriate diets, oral nutritional supplements, and vitamin/mineral supplements  - Order, calculate, and assess calorie counts as needed  - Recommend, monitor, and adjust tube feedings and TPN/PPN based on assessed needs  - Assess need for intravenous fluids  - Provide specific nutrition/hydration education as appropriate  - Include patient/family/caregiver in decisions related to nutrition  Outcome: Progressing     Problem: Prexisting or High Potential for Compromised Skin Integrity  Goal: Skin integrity is maintained or improved  Description: INTERVENTIONS:  - Identify patients at risk for skin breakdown  - Assess and monitor skin integrity  - Assess and monitor nutrition and hydration status  - Monitor labs   - Assess for incontinence   - Turn and reposition patient  - Assist with mobility/ambulation  - Relieve pressure over bony prominences  - Avoid friction and  shearing  - Provide appropriate hygiene as needed including keeping skin clean and dry  - Evaluate need for skin moisturizer/barrier cream  - Collaborate with interdisciplinary team   - Patient/family teaching  - Consider wound care consult   Outcome: Progressing     Problem: SAFETY,RESTRAINT: NV/NON-SELF DESTRUCTIVE BEHAVIOR  Goal: Remains free of harm/injury (restraint for non violent/non self-detsructive behavior)  Description: INTERVENTIONS:  - Instruct patient/family regarding restraint use   - Assess and monitor physiologic and psychological status   - Provide interventions and comfort measures to meet assessed patient needs   - Identify and implement measures to help patient regain control  - Assess readiness for release of restraint   Outcome: Progressing  Goal: Returns to optimal restraint-free functioning  Description: INTERVENTIONS:  - Assess the patient's behavior and symptoms that indicate continued need for restraint  - Identify and implement measures to help patient regain control  - Assess readiness for release of restraint   Outcome: Progressing

## 2024-11-18 NOTE — ASSESSMENT & PLAN NOTE
Patient presented with acute encephalopathy and seizure-like activity.  Video EEG concerning for status epilepticus.  MRI brain without contrast was limited by motion but showed restricted diffusion in the left temporal lobe which could be postictal versus infection.  Status post LP 11/12 and CSF studies unremarkable, not consistent with infection with 2 WBCs.  Protein mildly elevated which could be due to seizure activity itself.  ME panel negative, stand alone HSV PCR also negative.  However with new onset seizures and possible abnormality of the temporal lobe HSV remains a possibility as PCR may be negative and CSF bland and early disease.  CSF culture with no growth.  The patient has grown E. coli and ESBL Proteus in urine culture but in the absence of prior systemic signs of infection or localizing symptoms UTI, doubt this is causing status epilepticus. HIV negative. Fever and leukocytosis improving.              -For now continue IV acyclovir 10 mg/kg every 12 hours              -MRI brain with contrast when able                  -repeat LP pending to reassess CSF; if HSV remains negative and CSF bland then stop acyclovir    -AEDs per neurology

## 2024-11-19 ENCOUNTER — APPOINTMENT (INPATIENT)
Dept: RADIOLOGY | Facility: HOSPITAL | Age: 64
DRG: 097 | End: 2024-11-19
Payer: COMMERCIAL

## 2024-11-19 ENCOUNTER — APPOINTMENT (INPATIENT)
Dept: NEUROLOGY | Facility: CLINIC | Age: 64
DRG: 097 | End: 2024-11-19
Payer: COMMERCIAL

## 2024-11-19 LAB
AMPAR1 AB CSF QL CBA IFA: NEGATIVE
AMPAR1 IGG CSF QL IF: NEGATIVE
AMPAR2 IGG CSF QL CBA IFA: NEGATIVE
AMPAR2 IGG CSF QL IF: NEGATIVE
AMPHIPHYSIN AB CSF QL IF: NEGATIVE
AMPHIPHYSIN AB CSF QL IF: NEGATIVE
ANION GAP SERPL CALCULATED.3IONS-SCNC: 6 MMOL/L (ref 4–13)
ANION GAP SERPL CALCULATED.3IONS-SCNC: 7 MMOL/L (ref 4–13)
APTT PPP: 37 SECONDS (ref 23–34)
AQP4 H2O CHANNEL AB CSF QL IF: NEGATIVE
BUN SERPL-MCNC: 19 MG/DL (ref 5–25)
BUN SERPL-MCNC: 21 MG/DL (ref 5–25)
CA-I BLD-SCNC: 1.15 MMOL/L (ref 1.12–1.32)
CALCIUM SERPL-MCNC: 8.1 MG/DL (ref 8.4–10.2)
CALCIUM SERPL-MCNC: 8.3 MG/DL (ref 8.4–10.2)
CASPR2 AB CSF QL CBA IFA: NEGATIVE
CASPR2 AB CSF QL CBA IFA: NEGATIVE
CHLORIDE SERPL-SCNC: 100 MMOL/L (ref 96–108)
CHLORIDE SERPL-SCNC: 104 MMOL/L (ref 96–108)
CO2 SERPL-SCNC: 28 MMOL/L (ref 21–32)
CO2 SERPL-SCNC: 31 MMOL/L (ref 21–32)
CREAT SERPL-MCNC: 1.63 MG/DL (ref 0.6–1.3)
CREAT SERPL-MCNC: 1.68 MG/DL (ref 0.6–1.3)
CV2 AB CSF QL IF: NEGATIVE
CV2 AB CSF QL IF: NEGATIVE
DPPX IGG CSF QL IF: NEGATIVE
DPPX IGG CSF QL IF: NEGATIVE
ERYTHROCYTE [DISTWIDTH] IN BLOOD BY AUTOMATED COUNT: 16.6 % (ref 11.6–15.1)
ERYTHROCYTE [DISTWIDTH] IN BLOOD BY AUTOMATED COUNT: 16.7 % (ref 11.6–15.1)
GABABR AB CSF QL CBA IFA: NEGATIVE
GABABR IGG CSF QL CBA IFA: NEGATIVE
GAD65 AB CSF IA-SCNC: NEGATIVE NMOL/L
GAD65 AB CSF IA-SCNC: NEGATIVE NMOL/L
GFR SERPL CREATININE-BSD FRML MDRD: 31 ML/MIN/1.73SQ M
GFR SERPL CREATININE-BSD FRML MDRD: 32 ML/MIN/1.73SQ M
GLIAL NUC TYPE 1 AB CSF QL IF: NEGATIVE
GLIAL NUC TYPE 1 AB CSF QL IF: NEGATIVE
GLUCOSE SERPL-MCNC: 116 MG/DL (ref 65–140)
GLUCOSE SERPL-MCNC: 126 MG/DL (ref 65–140)
GLUCOSE SERPL-MCNC: 136 MG/DL (ref 65–140)
GLUCOSE SERPL-MCNC: 141 MG/DL (ref 65–140)
GLUCOSE SERPL-MCNC: 145 MG/DL (ref 65–140)
GLUCOSE SERPL-MCNC: 145 MG/DL (ref 65–140)
GLUCOSE SERPL-MCNC: 146 MG/DL (ref 65–140)
HCT VFR BLD AUTO: 32 % (ref 34.8–46.1)
HCT VFR BLD AUTO: 33.6 % (ref 34.8–46.1)
HGB BLD-MCNC: 9.2 G/DL (ref 11.5–15.4)
HGB BLD-MCNC: 9.9 G/DL (ref 11.5–15.4)
HU1 AB CSF QL IF: NEGATIVE
HU1 AB CSF QL IF: NEGATIVE
HU2 AB CSF QL IF: NEGATIVE
HU2 AB CSF QL IF: NEGATIVE
HU3 AB CSF QL IF: NEGATIVE
HU3 AB CSF QL IF: NEGATIVE
IGLON5 IGG CSF QL CBA IFA: NEGATIVE
IGLON5 IGG CSF QL CBA IFA: NEGATIVE
IMP & REVIEW OF LAB RESULTS: NEGATIVE
IMP & REVIEW OF LAB RESULTS: NEGATIVE
INR PPP: 1.39 (ref 0.85–1.19)
INR PPP: 1.45 (ref 0.85–1.19)
INR PPP: 1.55 (ref 0.85–1.19)
IP3R 1 IGG CSF QL IF: NEGATIVE
LGI1 AB CSF QL CBA IFA: NEGATIVE
LGI1 AB CSF QL CBA IFA: NEGATIVE
MA+TA AB CSF QL IF: NEGATIVE
MA+TA AB CSF QL IF: NEGATIVE
MAGNESIUM SERPL-MCNC: 1.9 MG/DL (ref 1.9–2.7)
MAGNESIUM SERPL-MCNC: 2.2 MG/DL (ref 1.9–2.7)
MCH RBC QN AUTO: 30.5 PG (ref 26.8–34.3)
MCH RBC QN AUTO: 31.8 PG (ref 26.8–34.3)
MCHC RBC AUTO-ENTMCNC: 28.8 G/DL (ref 31.4–37.4)
MCHC RBC AUTO-ENTMCNC: 29.5 G/DL (ref 31.4–37.4)
MCV RBC AUTO: 106 FL (ref 82–98)
MCV RBC AUTO: 108 FL (ref 82–98)
MGLUR1 IGG CSF QL CBA IFA: NEGATIVE
MGLUR1 IGG CSF QL CBA IFA: NEGATIVE
NMDAR IGG CSF QL IF: NEGATIVE
NMDAR IGG CSF QL IF: NEGATIVE
PCA-2 AB CSF QL IF: NEGATIVE
PCA-2 AB CSF QL IF: NEGATIVE
PCA-TR AB CSF QL CBA IFA: NEGATIVE
PCA-TR AB CSF QL CBA IFA: NEGATIVE
PCA-TR AB CSF QL IF: NEGATIVE
PCA-TR AB CSF QL IF: NEGATIVE
PHOSPHATE SERPL-MCNC: 2.5 MG/DL (ref 2.3–4.1)
PLATELET # BLD AUTO: 139 THOUSANDS/UL (ref 149–390)
PLATELET # BLD AUTO: 164 THOUSANDS/UL (ref 149–390)
PMV BLD AUTO: 10.2 FL (ref 8.9–12.7)
PMV BLD AUTO: 10.4 FL (ref 8.9–12.7)
POTASSIUM SERPL-SCNC: 3.5 MMOL/L (ref 3.5–5.3)
POTASSIUM SERPL-SCNC: 3.9 MMOL/L (ref 3.5–5.3)
PROTHROMBIN TIME: 17.3 SECONDS (ref 12.3–15)
PROTHROMBIN TIME: 17.8 SECONDS (ref 12.3–15)
PROTHROMBIN TIME: 18.8 SECONDS (ref 12.3–15)
PURKINJE CELLS AB CSF QL IF: NEGATIVE
PURKINJE CELLS AB CSF QL IF: NEGATIVE
RBC # BLD AUTO: 3.02 MILLION/UL (ref 3.81–5.12)
RBC # BLD AUTO: 3.11 MILLION/UL (ref 3.81–5.12)
SODIUM SERPL-SCNC: 138 MMOL/L (ref 135–147)
SODIUM SERPL-SCNC: 138 MMOL/L (ref 135–147)
VALPROATE SERPL-MCNC: 45 UG/ML (ref 50–100)
WBC # BLD AUTO: 6.46 THOUSAND/UL (ref 4.31–10.16)
WBC # BLD AUTO: 7.27 THOUSAND/UL (ref 4.31–10.16)
ZIC4 ANTIBODY, CSF: NEGATIVE
ZIC4 ANTIBODY, CSF: NEGATIVE

## 2024-11-19 PROCEDURE — 94003 VENT MGMT INPAT SUBQ DAY: CPT

## 2024-11-19 PROCEDURE — 95720 EEG PHY/QHP EA INCR W/VEEG: CPT | Performed by: PSYCHIATRY & NEUROLOGY

## 2024-11-19 PROCEDURE — 80164 ASSAY DIPROPYLACETIC ACD TOT: CPT | Performed by: STUDENT IN AN ORGANIZED HEALTH CARE EDUCATION/TRAINING PROGRAM

## 2024-11-19 PROCEDURE — 94668 MNPJ CHEST WALL SBSQ: CPT

## 2024-11-19 PROCEDURE — 80048 BASIC METABOLIC PNL TOTAL CA: CPT

## 2024-11-19 PROCEDURE — 82948 REAGENT STRIP/BLOOD GLUCOSE: CPT

## 2024-11-19 PROCEDURE — 84100 ASSAY OF PHOSPHORUS: CPT

## 2024-11-19 PROCEDURE — 99291 CRITICAL CARE FIRST HOUR: CPT | Performed by: STUDENT IN AN ORGANIZED HEALTH CARE EDUCATION/TRAINING PROGRAM

## 2024-11-19 PROCEDURE — 85730 THROMBOPLASTIN TIME PARTIAL: CPT

## 2024-11-19 PROCEDURE — 95715 VEEG EA 12-26HR INTMT MNTR: CPT

## 2024-11-19 PROCEDURE — 70450 CT HEAD/BRAIN W/O DYE: CPT

## 2024-11-19 PROCEDURE — 85730 THROMBOPLASTIN TIME PARTIAL: CPT | Performed by: STUDENT IN AN ORGANIZED HEALTH CARE EDUCATION/TRAINING PROGRAM

## 2024-11-19 PROCEDURE — 94640 AIRWAY INHALATION TREATMENT: CPT

## 2024-11-19 PROCEDURE — 85610 PROTHROMBIN TIME: CPT | Performed by: STUDENT IN AN ORGANIZED HEALTH CARE EDUCATION/TRAINING PROGRAM

## 2024-11-19 PROCEDURE — 85610 PROTHROMBIN TIME: CPT

## 2024-11-19 PROCEDURE — 80048 BASIC METABOLIC PNL TOTAL CA: CPT | Performed by: STUDENT IN AN ORGANIZED HEALTH CARE EDUCATION/TRAINING PROGRAM

## 2024-11-19 PROCEDURE — 62328 DX LMBR SPI PNXR W/FLUOR/CT: CPT

## 2024-11-19 PROCEDURE — 85027 COMPLETE CBC AUTOMATED: CPT

## 2024-11-19 PROCEDURE — 82330 ASSAY OF CALCIUM: CPT

## 2024-11-19 PROCEDURE — 94669 MECHANICAL CHEST WALL OSCILL: CPT

## 2024-11-19 PROCEDURE — 83735 ASSAY OF MAGNESIUM: CPT

## 2024-11-19 PROCEDURE — 99233 SBSQ HOSP IP/OBS HIGH 50: CPT | Performed by: STUDENT IN AN ORGANIZED HEALTH CARE EDUCATION/TRAINING PROGRAM

## 2024-11-19 PROCEDURE — 94664 DEMO&/EVAL PT USE INHALER: CPT

## 2024-11-19 PROCEDURE — 94760 N-INVAS EAR/PLS OXIMETRY 1: CPT

## 2024-11-19 PROCEDURE — 83735 ASSAY OF MAGNESIUM: CPT | Performed by: STUDENT IN AN ORGANIZED HEALTH CARE EDUCATION/TRAINING PROGRAM

## 2024-11-19 RX ORDER — FENTANYL CITRATE 50 UG/ML
100 INJECTION, SOLUTION INTRAMUSCULAR; INTRAVENOUS ONCE
Refills: 0 | Status: DISCONTINUED | OUTPATIENT
Start: 2024-11-19 | End: 2024-11-19

## 2024-11-19 RX ORDER — HEPARIN SODIUM 1000 [USP'U]/ML
10000 INJECTION, SOLUTION INTRAVENOUS; SUBCUTANEOUS EVERY 6 HOURS PRN
Status: DISCONTINUED | OUTPATIENT
Start: 2024-11-19 | End: 2024-11-22

## 2024-11-19 RX ORDER — MAGNESIUM SULFATE HEPTAHYDRATE 40 MG/ML
2 INJECTION, SOLUTION INTRAVENOUS ONCE
Status: COMPLETED | OUTPATIENT
Start: 2024-11-19 | End: 2024-11-19

## 2024-11-19 RX ORDER — HEPARIN SODIUM 1000 [USP'U]/ML
10000 INJECTION, SOLUTION INTRAVENOUS; SUBCUTANEOUS ONCE
Status: COMPLETED | OUTPATIENT
Start: 2024-11-19 | End: 2024-11-19

## 2024-11-19 RX ORDER — POTASSIUM CHLORIDE 20MEQ/15ML
40 LIQUID (ML) ORAL ONCE
Status: COMPLETED | OUTPATIENT
Start: 2024-11-19 | End: 2024-11-19

## 2024-11-19 RX ORDER — HEPARIN SODIUM 1000 [USP'U]/ML
5000 INJECTION, SOLUTION INTRAVENOUS; SUBCUTANEOUS EVERY 6 HOURS PRN
Status: DISCONTINUED | OUTPATIENT
Start: 2024-11-19 | End: 2024-11-22

## 2024-11-19 RX ORDER — MICONAZOLE NITRATE 20 MG/G
CREAM TOPICAL 2 TIMES DAILY
Status: DISCONTINUED | OUTPATIENT
Start: 2024-11-19 | End: 2024-12-09 | Stop reason: HOSPADM

## 2024-11-19 RX ORDER — HEPARIN SODIUM 10000 [USP'U]/100ML
3-30 INJECTION, SOLUTION INTRAVENOUS
Status: DISCONTINUED | OUTPATIENT
Start: 2024-11-19 | End: 2024-11-22

## 2024-11-19 RX ORDER — LIDOCAINE HYDROCHLORIDE 10 MG/ML
5 INJECTION, SOLUTION EPIDURAL; INFILTRATION; INTRACAUDAL; PERINEURAL
Status: COMPLETED | OUTPATIENT
Start: 2024-11-19 | End: 2024-11-19

## 2024-11-19 RX ADMIN — THIAMINE HYDROCHLORIDE 500 MG: 100 INJECTION, SOLUTION INTRAMUSCULAR; INTRAVENOUS at 08:56

## 2024-11-19 RX ADMIN — LEVALBUTEROL HYDROCHLORIDE 1.25 MG: 1.25 SOLUTION RESPIRATORY (INHALATION) at 19:11

## 2024-11-19 RX ADMIN — CHLORHEXIDINE GLUCONATE 0.12% ORAL RINSE 15 ML: 1.2 LIQUID ORAL at 20:54

## 2024-11-19 RX ADMIN — LEVALBUTEROL HYDROCHLORIDE 1.25 MG: 1.25 SOLUTION RESPIRATORY (INHALATION) at 07:05

## 2024-11-19 RX ADMIN — ACYCLOVIR SODIUM 775 MG: 50 INJECTION, SOLUTION INTRAVENOUS at 20:54

## 2024-11-19 RX ADMIN — FAMOTIDINE 20 MG: 20 TABLET, FILM COATED ORAL at 08:22

## 2024-11-19 RX ADMIN — VALPROATE SODIUM 500 MG: 100 INJECTION, SOLUTION INTRAVENOUS at 10:26

## 2024-11-19 RX ADMIN — HEPARIN SODIUM 10000 UNITS: 1000 INJECTION INTRAVENOUS; SUBCUTANEOUS at 17:28

## 2024-11-19 RX ADMIN — LEVOTHYROXINE SODIUM 100 MCG: 100 TABLET ORAL at 06:25

## 2024-11-19 RX ADMIN — ACYCLOVIR SODIUM 775 MG: 50 INJECTION, SOLUTION INTRAVENOUS at 10:30

## 2024-11-19 RX ADMIN — VALPROATE SODIUM 500 MG: 100 INJECTION, SOLUTION INTRAVENOUS at 21:47

## 2024-11-19 RX ADMIN — Medication 12.5 MG: at 20:54

## 2024-11-19 RX ADMIN — MICONAZOLE NITRATE: 20 CREAM TOPICAL at 10:00

## 2024-11-19 RX ADMIN — VALPROATE SODIUM 500 MG: 100 INJECTION, SOLUTION INTRAVENOUS at 17:45

## 2024-11-19 RX ADMIN — THIAMINE HYDROCHLORIDE 500 MG: 100 INJECTION, SOLUTION INTRAMUSCULAR; INTRAVENOUS at 20:58

## 2024-11-19 RX ADMIN — FUROSEMIDE 40 MG: 10 INJECTION, SOLUTION INTRAVENOUS at 08:05

## 2024-11-19 RX ADMIN — CHLORHEXIDINE GLUCONATE 0.12% ORAL RINSE 15 ML: 1.2 LIQUID ORAL at 08:25

## 2024-11-19 RX ADMIN — THIAMINE HYDROCHLORIDE 500 MG: 100 INJECTION, SOLUTION INTRAMUSCULAR; INTRAVENOUS at 16:52

## 2024-11-19 RX ADMIN — LEVETIRACETAM 750 MG: 100 INJECTION, SOLUTION INTRAVENOUS at 08:22

## 2024-11-19 RX ADMIN — LIDOCAINE HYDROCHLORIDE 5 ML: 10 INJECTION, SOLUTION EPIDURAL; INFILTRATION; INTRACAUDAL; PERINEURAL at 15:45

## 2024-11-19 RX ADMIN — VASOPRESSIN 0.04 UNITS/MIN: 20 INJECTION INTRAVENOUS at 03:10

## 2024-11-19 RX ADMIN — VASOPRESSIN 0.04 UNITS/MIN: 20 INJECTION INTRAVENOUS at 14:18

## 2024-11-19 RX ADMIN — POTASSIUM CHLORIDE 40 MEQ: 20 SOLUTION ORAL at 19:48

## 2024-11-19 RX ADMIN — LEVALBUTEROL HYDROCHLORIDE 1.25 MG: 1.25 SOLUTION RESPIRATORY (INHALATION) at 12:37

## 2024-11-19 RX ADMIN — VASOPRESSIN 0.04 UNITS/MIN: 20 INJECTION INTRAVENOUS at 20:54

## 2024-11-19 RX ADMIN — MAGNESIUM SULFATE HEPTAHYDRATE 2 G: 40 INJECTION, SOLUTION INTRAVENOUS at 08:04

## 2024-11-19 RX ADMIN — HEPARIN SODIUM 18 UNITS/KG/HR: 10000 INJECTION, SOLUTION INTRAVENOUS at 17:28

## 2024-11-19 RX ADMIN — VALPROATE SODIUM 500 MG: 100 INJECTION, SOLUTION INTRAVENOUS at 04:06

## 2024-11-19 RX ADMIN — MICONAZOLE NITRATE 1 APPLICATION: 20 CREAM TOPICAL at 17:52

## 2024-11-19 RX ADMIN — LEVETIRACETAM 750 MG: 100 INJECTION, SOLUTION INTRAVENOUS at 20:54

## 2024-11-19 RX ADMIN — SERTRALINE HYDROCHLORIDE 50 MG: 50 TABLET ORAL at 08:23

## 2024-11-19 RX ADMIN — ATORVASTATIN CALCIUM 10 MG: 10 TABLET, FILM COATED ORAL at 16:52

## 2024-11-19 RX ADMIN — FUROSEMIDE 40 MG: 10 INJECTION, SOLUTION INTRAVENOUS at 16:52

## 2024-11-19 NOTE — PLAN OF CARE
Problem: PAIN - ADULT  Goal: Verbalizes/displays adequate comfort level or baseline comfort level  Description: Interventions:  - Encourage patient to monitor pain and request assistance  - Assess pain using appropriate pain scale  - Administer analgesics based on type and severity of pain and evaluate response  - Implement non-pharmacological measures as appropriate and evaluate response  - Consider cultural and social influences on pain and pain management  - Notify physician/advanced practitioner if interventions unsuccessful or patient reports new pain  Outcome: Progressing     Problem: INFECTION - ADULT  Goal: Absence or prevention of progression during hospitalization  Description: INTERVENTIONS:  - Assess and monitor for signs and symptoms of infection  - Monitor lab/diagnostic results  - Monitor all insertion sites, i.e. indwelling lines, tubes, and drains  - Monitor endotracheal if appropriate and nasal secretions for changes in amount and color  - [place] appropriate cooling/warming therapies per order  - Administer medications as ordered  - Instruct and encourage patient and family to use good hand hygiene technique  - Identify and instruct in appropriate isolation precautions for identified infection/condition  Outcome: Progressing  Goal: Absence of fever/infection during neutropenic period  Description: INTERVENTIONS:  - Monitor WBC    Outcome: Progressing     Problem: SAFETY ADULT  Goal: Patient will remain free of falls  Description: INTERVENTIONS:  - Educate patient/family on patient safety including physical limitations  - Instruct patient to call for assistance with activity   - Consult OT/PT to assist with strengthening/mobility   - Keep Call bell within reach  - Keep bed low and locked with side rails adjusted as appropriate  - Keep care items and personal belongings within reach  - Initiate and maintain comfort rounds  - Make Fall Risk Sign visible to staff  - Offer Toileting every 2 Hours,  in advance of need  - Initiate/Maintain bed alarm  - Obtain necessary fall risk management equipment:   - Apply yellow socks and bracelet for high fall risk patients  - Consider moving patient to room near nurses station  Outcome: Progressing  Goal: Maintain or return to baseline ADL function  Description: INTERVENTIONS:  -  Assess patient's ability to carry out ADLs; assess patient's baseline for ADL function and identify physical deficits which impact ability to perform ADLs (bathing, care of mouth/teeth, toileting, grooming, dressing, etc.)  - Assess/evaluate cause of self-care deficits   - Assess range of motion  - Assess patient's mobility; develop plan if impaired  - Assess patient's need for assistive devices and provide as appropriate  - Encourage maximum independence but intervene and supervise when necessary  - Involve family in performance of ADLs  - Assess for home care needs following discharge   - Consider OT consult to assist with ADL evaluation and planning for discharge  - Provide patient education as appropriate  Outcome: Progressing  Goal: Maintains/Returns to pre admission functional level  Description: INTERVENTIONS:  - Perform AM-PAC 6 Click Basic Mobility/ Daily Activity assessment daily.  - Set and communicate daily mobility goal to care team and patient/family/caregiver.   - Collaborate with rehabilitation services on mobility goals if consulted  - Perform Range of Motion 2 times a day.  - Reposition patient every 2 hours.  - Out of bed for toileting  - Record patient progress and toleration of activity level   Outcome: Progressing     Problem: DISCHARGE PLANNING  Goal: Discharge to home or other facility with appropriate resources  Description: INTERVENTIONS:  - Identify barriers to discharge w/patient and caregiver  - Arrange for needed discharge resources and transportation as appropriate  - Identify discharge learning needs (meds, wound care, etc.)  - Arrange for interpretive services to  assist at discharge as needed  - Refer to Case Management Department for coordinating discharge planning if the patient needs post-hospital services based on physician/advanced practitioner order or complex needs related to functional status, cognitive ability, or social support system  Outcome: Progressing     Problem: Knowledge Deficit  Goal: Patient/family/caregiver demonstrates understanding of disease process, treatment plan, medications, and discharge instructions  Description: Complete learning assessment and assess knowledge base.  Interventions:  - Provide teaching at level of understanding  - Provide teaching via preferred learning methods  Outcome: Progressing     Problem: NEUROSENSORY - ADULT  Goal: Achieves stable or improved neurological status  Description: INTERVENTIONS  - Monitor and report changes in neurological status  - Monitor vital signs such as temperature, blood pressure, glucose, and any other labs ordered   - Initiate measures to prevent increased intracranial pressure  - Monitor for seizure activity and implement precautions if appropriate      Outcome: Progressing  Goal: Remains free of injury related to seizures activity  Description: INTERVENTIONS  - Maintain airway, patient safety  and administer oxygen as ordered  - Monitor patient for seizure activity, document and report duration and description of seizure to physician/advanced practitioner  - If seizure occurs,  ensure patient safety during seizure  - Reorient patient post seizure  - Seizure pads on all 4 side rails  - Instruct patient/family to notify RN of any seizure activity including if an aura is experienced  - Instruct patient/family to call for assistance with activity based on nursing assessment  - Administer anti-seizure medications if ordered    Outcome: Progressing  Goal: Achieves maximal functionality and self care  Description: INTERVENTIONS  - Monitor swallowing and airway patency with patient fatigue and changes in  neurological status  - Encourage and assist patient to increase activity and self care.   - Encourage visually impaired, hearing impaired and aphasic patients to use assistive/communication devices  Outcome: Progressing     Problem: CARDIOVASCULAR - ADULT  Goal: Maintains optimal cardiac output and hemodynamic stability  Description: INTERVENTIONS:  - Monitor I/O, vital signs and rhythm  - Monitor for S/S and trends of decreased cardiac output  - Administer and titrate ordered vasoactive medications to optimize hemodynamic stability  - Assess quality of pulses, skin color and temperature  - Assess for signs of decreased coronary artery perfusion  - Instruct patient to report change in severity of symptoms  Outcome: Progressing  Goal: Absence of cardiac dysrhythmias or at baseline rhythm  Description: INTERVENTIONS:  - Continuous cardiac monitoring, vital signs, obtain 12 lead EKG if ordered  - Administer antiarrhythmic and heart rate control medications as ordered  - Monitor electrolytes and administer replacement therapy as ordered  Outcome: Progressing     Problem: RESPIRATORY - ADULT  Goal: Achieves optimal ventilation and oxygenation  Description: INTERVENTIONS:  - Assess for changes in respiratory status  - Assess for changes in mentation and behavior  - Position to facilitate oxygenation and minimize respiratory effort  - Oxygen administered by appropriate delivery if ordered  - Initiate smoking cessation education as indicated  - Encourage broncho-pulmonary hygiene including cough, deep breathe, Incentive Spirometry  - Assess the need for suctioning and aspirate as needed  - Assess and instruct to report SOB or any respiratory difficulty  - Respiratory Therapy support as indicated  Outcome: Progressing     Problem: GASTROINTESTINAL - ADULT  Goal: Minimal or absence of nausea and/or vomiting  Description: INTERVENTIONS:  - Administer IV fluids if ordered to ensure adequate hydration  - Maintain NPO status  until nausea and vomiting are resolved  - Nasogastric tube if ordered  - Administer ordered antiemetic medications as needed  - Provide nonpharmacologic comfort measures as appropriate  - Advance diet as tolerated, if ordered  - Consider nutrition services referral to assist patient with adequate nutrition and appropriate food choices  Outcome: Progressing  Goal: Maintains or returns to baseline bowel function  Description: INTERVENTIONS:  - Assess bowel function  - Encourage oral fluids to ensure adequate hydration  - Administer IV fluids if ordered to ensure adequate hydration  - Administer ordered medications as needed  - Encourage mobilization and activity  - Consider nutritional services referral to assist patient with adequate nutrition and appropriate food choices  Outcome: Progressing  Goal: Maintains adequate nutritional intake  Description: INTERVENTIONS:  - Monitor percentage of each meal consumed  - Identify factors contributing to decreased intake, treat as appropriate  - Assist with meals as needed  - Monitor I&O, weight, and lab values if indicated  - Obtain nutrition services referral as needed  Outcome: Progressing  Goal: Establish and maintain optimal ostomy function  Description: INTERVENTIONS:  - Assess bowel function  - Encourage oral fluids to ensure adequate hydration  - Administer IV fluids if ordered to ensure adequate hydration   - Administer ordered medications as needed  - Encourage mobilization and activity  - Nutrition services referral to assist patient with appropriate food choices  - Assess stoma site  - Consider wound care consult   Outcome: Progressing  Goal: Oral mucous membranes remain intact  Description: INTERVENTIONS  - Assess oral mucosa and hygiene practices  - Implement preventative oral hygiene regimen  - Implement oral medicated treatments as ordered  - Initiate Nutrition services referral as needed  Outcome: Progressing     Problem: GENITOURINARY - ADULT  Goal: Maintains  or returns to baseline urinary function  Description: INTERVENTIONS:  - Assess urinary function  - Encourage oral fluids to ensure adequate hydration if ordered  - Administer IV fluids as ordered to ensure adequate hydration  - Administer ordered medications as needed  - Offer frequent toileting  - Follow urinary retention protocol if ordered  Outcome: Progressing  Goal: Absence of urinary retention  Description: INTERVENTIONS:  - Assess patient’s ability to void and empty bladder  - Monitor I/O  - Bladder scan as needed  - Discuss with physician/AP medications to alleviate retention as needed  - Discuss catheterization for long term situations as appropriate  Outcome: Progressing  Goal: Urinary catheter remains patent  Description: INTERVENTIONS:  - Assess patency of urinary catheter  - If patient has a chronic peace, consider changing catheter if non-functioning  - Follow guidelines for intermittent irrigation of non-functioning urinary catheter  Outcome: Progressing     Problem: METABOLIC, FLUID AND ELECTROLYTES - ADULT  Goal: Electrolytes maintained within normal limits  Description: INTERVENTIONS:  - Monitor labs and assess patient for signs and symptoms of electrolyte imbalances  - Administer electrolyte replacement as ordered  - Monitor response to electrolyte replacements, including repeat lab results as appropriate  - Instruct patient on fluid and nutrition as appropriate  Outcome: Progressing  Goal: Fluid balance maintained  Description: INTERVENTIONS:  - Monitor labs   - Monitor I/O and WT  - Instruct patient on fluid and nutrition as appropriate  - Assess for signs & symptoms of volume excess or deficit  Outcome: Progressing  Goal: Glucose maintained within target range  Description: INTERVENTIONS:  - Monitor Blood Glucose as ordered  - Assess for signs and symptoms of hyperglycemia and hypoglycemia  - Administer ordered medications to maintain glucose within target range  - Assess nutritional intake and  initiate nutrition service referral as needed  Outcome: Progressing     Problem: SKIN/TISSUE INTEGRITY - ADULT  Goal: Skin Integrity remains intact(Skin Breakdown Prevention)  Description: Assess:  -Perform Carmelo assessment every shift  -Clean and moisturize skin every shift  -Inspect skin when repositioning, toileting, and assisting with ADLS  -Assess extremities for adequate circulation and sensation     Bed Management:  -Have minimal linens on bed & keep smooth, unwrinkled  -Change linens as needed when moist or perspiring  -Avoid sitting or lying in one position for more than 2 hours while in bed  -Keep HOB at 30 degrees     Toileting:  -Offer bedside commode  -Assess for incontinence every hour  -Use incontinent care products after each incontinent episode such as barrier cream wipes    Activity:  -Encourage activity and walks on unit  -Encourage or provide ROM exercises   -Turn and reposition patient every 2 Hours  -Use appropriate equipment to lift or move patient in bed      Skin Care:  -Avoid use of baby powder, tape, friction and shearing, hot water or constrictive clothing  -Relieve pressure over bony prominences using foam dressing  -Do not massage red bony areas      Outcome: Progressing  Goal: Incision(s), wounds(s) or drain site(s) healing without S/S of infection  Description: INTERVENTIONS  - Assess and document dressing, incision, wound bed, drain sites and surrounding tissue  - Provide patient and family education  - Perform skin care/dressing changes every shift and as needed  Outcome: Progressing  Goal: Pressure injury heals and does not worsen  Description: Interventions:  - Implement low air loss mattress or specialty surface (Criteria met)  - Apply silicone foam dressing  - Use special pressure reducing interventions such as waffle cushion when in chair   - Apply fecal or urinary incontinence containment device   - Perform passive or active ROM every shift  - Turn and reposition patient &  offload bony prominences every 2 hours   - Utilize friction reducing device or surface for transfers   - Consider consults to  interdisciplinary teams such as wound care  - Use incontinent care products after each incontinent episode such as barrier cream wipes  - Consider nutrition services referral as needed  Outcome: Progressing     Problem: HEMATOLOGIC - ADULT  Goal: Maintains hematologic stability  Description: INTERVENTIONS  - Assess for signs and symptoms of bleeding or hemorrhage  - Monitor labs  - Administer supportive blood products/factors as ordered and appropriate  Outcome: Progressing     Problem: MUSCULOSKELETAL - ADULT  Goal: Maintain or return mobility to safest level of function  Description: INTERVENTIONS:  - Assess patient's ability to carry out ADLs; assess patient's baseline for ADL function and identify physical deficits which impact ability to perform ADLs (bathing, care of mouth/teeth, toileting, grooming, dressing, etc.)  - Assess/evaluate cause of self-care deficits   - Assess range of motion  - Assess patient's mobility  - Assess patient's need for assistive devices and provide as appropriate  - Encourage maximum independence but intervene and supervise when necessary  - Involve family in performance of ADLs  - Assess for home care needs following discharge   - Consider OT consult to assist with ADL evaluation and planning for discharge  - Provide patient education as appropriate  Outcome: Progressing  Goal: Maintain proper alignment of affected body part  Description: INTERVENTIONS:  - Support, maintain and protect limb and body alignment  - Provide patient/ family with appropriate education  Outcome: Progressing     Problem: Nutrition/Hydration-ADULT  Goal: Nutrient/Hydration intake appropriate for improving, restoring or maintaining nutritional needs  Description: Monitor and assess patient's nutrition/hydration status for malnutrition. Collaborate with interdisciplinary team and  initiate plan and interventions as ordered.  Monitor patient's weight and dietary intake as ordered or per policy. Utilize nutrition screening tool and intervene as necessary. Determine patient's food preferences and provide high-protein, high-caloric foods as appropriate.     INTERVENTIONS:  - Monitor oral intake, urinary output, labs, and treatment plans  - Assess nutrition and hydration status and recommend course of action  - Evaluate amount of meals eaten  - Assist patient with eating if necessary   - Allow adequate time for meals  - Recommend/ encourage appropriate diets, oral nutritional supplements, and vitamin/mineral supplements  - Order, calculate, and assess calorie counts as needed  - Recommend, monitor, and adjust tube feedings and TPN/PPN based on assessed needs  - Assess need for intravenous fluids  - Provide specific nutrition/hydration education as appropriate  - Include patient/family/caregiver in decisions related to nutrition  Outcome: Progressing     Problem: Prexisting or High Potential for Compromised Skin Integrity  Goal: Skin integrity is maintained or improved  Description: INTERVENTIONS:  - Identify patients at risk for skin breakdown  - Assess and monitor skin integrity  - Assess and monitor nutrition and hydration status  - Monitor labs   - Assess for incontinence   - Turn and reposition patient  - Assist with mobility/ambulation  - Relieve pressure over bony prominences  - Avoid friction and shearing  - Provide appropriate hygiene as needed including keeping skin clean and dry  - Evaluate need for skin moisturizer/barrier cream  - Collaborate with interdisciplinary team   - Patient/family teaching  - Consider wound care consult   Outcome: Progressing     Problem: SAFETY,RESTRAINT: NV/NON-SELF DESTRUCTIVE BEHAVIOR  Goal: Remains free of harm/injury (restraint for non violent/non self-detsructive behavior)  Description: INTERVENTIONS:  - Instruct patient/family regarding restraint use    - Assess and monitor physiologic and psychological status   - Provide interventions and comfort measures to meet assessed patient needs   - Identify and implement measures to help patient regain control  - Assess readiness for release of restraint   Outcome: Progressing  Goal: Returns to optimal restraint-free functioning  Description: INTERVENTIONS:  - Assess the patient's behavior and symptoms that indicate continued need for restraint  - Identify and implement measures to help patient regain control  - Assess readiness for release of restraint   Outcome: Progressing

## 2024-11-19 NOTE — ASSESSMENT & PLAN NOTE
Lab Results   Component Value Date    EGFR 31 11/19/2024    EGFR 30 11/18/2024    EGFR 36 11/17/2024    CREATININE 1.68 (H) 11/19/2024    CREATININE 1.72 (H) 11/18/2024    CREATININE 1.48 (H) 11/17/2024   Baseline creatinine 1.7-1.9.  Creatinine currently at baseline and stable.  Dose adjust antibiotic car wheels as needed for creatinine clearance.  Monitor creatinine daily

## 2024-11-19 NOTE — RESPIRATORY THERAPY NOTE
Resp Vent Note   11/19/24 0216   Respiratory Assessment   Assessment Type Assess only   General Appearance Sedated   Respiratory Pattern Assisted   Chest Assessment Chest expansion symmetrical   Bilateral Breath Sounds Diminished;Clear   Suction ET Tube   Resp Comments Pt conts on SCMV vent settings as ordered, no vent changes at this time, will cont to flo per resp prot order.   O2 Device G5 vent   Vent Information   Vent ID [id number]   Vent type Kathleen G5   Kathleen Vent Mode (S)CMV   $ Vent Daily Charge-Subsequent Yes   $ Pulse Oximetry Spot Check Charge Completed   (S)CMV Settings   Resp Rate (BPM) 16 BPM   VT (mL) 420 mL   FIO2 (%) 40 %   PEEP (cmH2O) 8 cmH2O   I:E Ratio 1:2.7   Insp Time (%) 1 %   Flow Trigger (LPM) 5   Humidification Heater   Heater Temperature (Set) 98.6 °F (37 °C)   (S)CMV Actuals   Resp Rate (BPM) 16 BPM   VT (mL) 434   MV 6.9   MAP (cmH2O) 12 cmH2O   Peak Pressure (cmH2O) 25 cmH2O   I:E Ratio (Obs) 1:2.8   Insp Resistance 11   Heater Temperature (Obs) 98.6 °F (37 °C)   Static Compliance (mL/cmH20) 32.1 mL/cmH2O   (S)CMV Alarms   High Peak Pressure (cmH2O) 40   Low Pressure (cmH2O) 5 cm H2O   High Resp Rate (BPM) 40 BPM   Low Resp Rate (BPM) 8 BPM   High MV (L/min) 20 L/min   Low MV (L/min) 4 L/min   High VT (mL) 1000 mL   Low VT (mL) 250 mL   Apnea Time (s) 20 S   Maintenance   Alarm (pink) cable attached No   Resuscitation bag with peep valve at bedside Yes   Water bag changed No   Circuit changed No   ETT  Cuffed 7.5 mm   Placement Date/Time: 11/12/24 1830   Preoxygenated: Yes  Technique: Video laryngoscopy  Type: Cuffed  Tube Size: 7.5 mm  Laryngoscope: GlideScope  Location: Oral  Placement Verification: Auscultation;Chest x-ray;End tidal CO2  Comments: ETT found @ 22...   Secured at (cm) 22   Measured from Lips   Secured by Commercial tube hernandez   Cuff Pressure (color) Green   HI-LO Suction  Intermittent suction   HI-LO Secretions Scant   HI-LO Intervention Patent

## 2024-11-19 NOTE — PROGRESS NOTES
Progress Note - Critical Care/ICU   Name: Adelina Soto 64 y.o. female I MRN: [id number]  Unit/Bed#: [facility] 01 I Date of Admission: 11/12/2024   Date of Service: 11/19/2024 I Hospital Day: 7       Summary:  Patient is a 64 year old female with history of Afib on Warfarin, HTN, T2DM, morbid obesity, HLD, hypothyroidism, initially presented to [facility] on 11/12 after being found by her partner at home with AMS, seizure-like activity. EMS witnessed tonic-clonic seizure-like activity, treated with 6 mg of Versed. 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 Was subsequently intubated for airway protection and transferred to [facility]. Initial LP generally unremarkable apart from RBC's 300's from otherwise atraumatic tap, negative ME panel and HSV. 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     Transferred to [facility] on 11/17, during evaluation pressors were weaned and AEDs adjusted. Now pending repeat LP.    Assessment & Plan     Neuro:   Diagnosis: Status epilepticus  Initially presented with AMS, witnessed seizures  Initiral MRI c/f possible HSV encephalitis, started on acyclovir  Neurology following, vEEG initially showing left frontal seizures, burst suppressed initially, now on Keppra 1g q12h, Phenytoin 100 mg q8h, Depacon 375 mg q6h  LP showed glucose 89, protein 74, 339 RBC's, 2 WBC's, ME panel negative  ID following, recommend continuing acyclovir 10 mg/kg every 12 hours for time being  Plan for repeat LP with IR.  Pending repeat MRI brain with contrast     Diagnosis: H/o depression  Plan:  Continue home Zoloft     CV:   Diagnosis: Shock/hypotension, likely in setting of sedation  Plan:  MAP 65-70 on Vaso alone  Wean to target MAP > 65 as able     Diagnosis:  Elevated troponin  54 on arrival, peaked at 1887 on 11/13  ECG without ST changes  Likely demand related due to RVR and/or hypotension as above  Plan:  Monitor telemetry, rate control < 110 bpm     Diagnosis: H/o Afib on Warfarin  Plan:  Holding home Lopressor d/t hypotension  AC with heparin gtt, transition back to Warfarin when appropriate  Held into 11/18 prior to LP     Pulm:  Diagnosis: Acute hypoxic respiratory failure  Intubated on arrival for airway protection due to AMS  Vent settings: SCMV, 16/420/8/40%  Bronch 11/12 with generalized edematous friable mucosa and moderate thick purulent secretions, culture with mixed respiratory viktoriya  Initial chest x-ray on arrival with mild edema more so on the right, left-sided whiteout likely from mucous plugging and atelectasis, given improvement after bronchoscopy, now continues to have crackles bilaterally  GI:   Diagnosis: GERD  Plan:  Continue home pepcid     :   Diagnosis: CKD  Baseline Cr 1.4-1.8  Remains baseline throughout hospital stay  Monitor renal fx, monitor I/O's, avoid nephrotoxins     F/E/N:   F - none  E - replenished w/ 2g Mg IVPB  N - TF with Vital 1.2 @ 55/hr     Heme/Onc:   Diagnosis: Anemia  Hgb 9-10 this admission  Stable thus far, transfuse if < 7     Endo:   Diagnosis: Hypothyroidism  TSH 0.29, T4 1.23  Continue home levothyroxine     Diagnosis: T2DM  Diet controlled, not on medications outpatient  Monitor BG for goal 140-180  Add SSI if needed     ID:   Diagnosis: Possible encephalitis  Initial MRI brain w/ c/f HSV encephalitis  LP and ME panel neg  ID following, continue Acyclovir 10 mg/kg q12h, repeat LP today and if HSV negative again d/c acyclovir     Diagnosis: UTI, possible pneumonia  Urine culture growing E. coli and ESBL proteus  Bronchoscopy with copious purulent secretions, however culture with mixed respiratory viktoriya  Afebrile > 24 hours, WBC downtrending     MSK/Skin:   Diagnosis: Pressure wounds  Continue wound care      Disposition: Critical care    ICU Core Measures     Vented Patient  VAP Bundle  VAP bundle ordered     A: Assess, Prevent, and Manage Pain Has pain been assessed? Yes  Need for changes to pain regimen? No   B: Both Spontaneous Awakening Trials (SATs) and Spontaneous Breathing Trials (SBTs) Plan to perform spontaneous awakening trial today? Yes   Plan to perform spontaneous breathing trial today? Yes   Obvious barriers to extubation? Yes   C: Choice of Sedation RASS Goal: 0 Alert and Calm  Need for changes to sedation or analgesia regimen? No   D: Delirium CAM-ICU: Unable to perform secondary to Acute cognitive dysfunction   E: Early Mobility  Plan for early mobility? NA   F: Family Engagement Plan for family engagement today? Yes       Antibiotic Review: Patient on appropriate coverage based on culture data.     Review of Invasive Devices:    Stringer Plan: Continue for accurate I/O monitoring for 48 hours  Central access plan: Medications requiring central line      Prophylaxis:  VTE VTE covered by:    None       Stress Ulcer  covered byfamotidine (PEPCID) 20 mg tablet [[id number]] (Long-Term Med), famotidine (PEPCID) tablet 20 mg [[id number]], pantoprazole (PROTONIX) 40 mg tablet [[id number]] (Long-Term Med)         24 Hour Events : AISSATOU overnight, INR improved to 1.55 this morning. Tentatively pending repeat LP. Unfortunately still no signs of neurological improvement, remains on Vasopressin as well as vEEG.    Subjective   Review of Systems: Review of Systems not obtainable due to Clinical Condition    Objective :                   Vitals I/O      Most Recent Min/Max in 24hrs   Temp 99 °F (37.2 °C) Temp  Min: 97.2 °F (36.2 °C)  Max: 99 °F (37.2 °C)   Pulse 104 Pulse  Min: 98  Max: 111   Resp 16 Resp  Min: 16  Max: 32   BP 92/54 BP  Min: 79/54  Max: 116/55   O2 Sat 96 % SpO2  Min: 84 %  Max: 99 %      Intake/Output Summary (Last 24 hours) at 11/19/2024 0637  Last data filed at 11/19/2024 0400  Gross per 24 hour    Intake 2154 ml   Output 1915 ml   Net 239 ml       Diet Enteral/Parenteral; Tube Feeding No Oral Diet; Vital AF 1.2; Cyclic; 55; 22 hours    Invasive Monitoring           Physical Exam   Physical Exam  Eyes:      Comments: Pupils bilaterally reactive, sluggish   Skin:     General: Skin is warm and dry.   HENT:      Head: Normocephalic and atraumatic.      Mouth/Throat:      Mouth: No angioedema.   Cardiovascular:      Rate and Rhythm: Tachycardia present. Rhythm irregular.   Musculoskeletal:      Right lower leg: Trace Edema present.      Left lower leg: Trace Edema present.      Comments: RUE edema noted.   Abdominal: General: There is no distension.      Palpations: Abdomen is soft.      Tenderness: There is no abdominal tenderness.   Constitutional:       General: She is not in acute distress.     Interventions: She is intubated. She is not sedated.     Comments: Patient is unresponsive   Pulmonary:      Effort: She is intubated.      Breath sounds: Normal breath sounds.   Neurological:      Mental Status: She is unresponsive.        Corneal reflex absent, no cough reflex and gag reflex not intact.          Diagnostic Studies        Lab Results: I have reviewed the following results:     Medications:  Scheduled PRN   acyclovir, 10 mg/kg (Adjusted), Q12H  atorvastatin, 10 mg, Daily With Dinner  chlorhexidine, 15 mL, Q12H LINO  famotidine, 20 mg, Daily  furosemide, 40 mg, BID (diuretic)  insulin lispro, 2-12 Units, Q6H LINO  levalbuterol, 1.25 mg, TID  levETIRAcetam, 750 mg, Q12H LINO  levothyroxine, 100 mcg, Early Morning  metoprolol tartrate, 12.5 mg, Q12H LINO  EARLINE ANTIFUNGAL, , BID  sertraline, 50 mg, Daily  thiamine, 500 mg, TID  valproate sodium, 500 mg, Q6H LINO      acetaminophen, 1,000 mg, Q6H PRN  albuterol, 2.5 mg, Q6H PRN  fentaNYL, 50 mcg, Q1H PRN  LORazepam, 2 mg, Q4H PRN  ondansetron, 4 mg, Q4H PRN       Continuous    vasopressin, 0.04 Units/min, Last Rate: 0.04 Units/min (11/19/24 0310)         Labs:    CBC    Recent Labs     11/18/24 0447 11/19/24 0424   WBC 6.04 6.46   HGB 9.5* 9.2*   HCT 31.4* 32.0*   * 139*     BMP    Recent Labs     11/18/24 0447 11/19/24  0424   SODIUM 138 138   K 3.9 3.9    104   CO2 27 28   AGAP 8 6   BUN 18 19   CREATININE 1.72* 1.68*   CALCIUM 8.0* 8.1*       Coags    Recent Labs     11/18/24 0447 11/18/24  1135 11/19/24  0424   INR  --  1.81* 1.55*   PTT 46*  --   --         Additional Electrolytes  Recent Labs     11/18/24 0447 11/19/24 0424   MG 1.6* 1.9   PHOS 3.3 2.5          Blood Gas    No recent results  No recent results LFTs  No recent results    Infectious  No recent results  Glucose  Recent Labs     11/18/24 0447 11/19/24  0424   GLUC 136 145*

## 2024-11-19 NOTE — QUICK NOTE
CCM     Case discussed with radiology, they attempted fluoroscopic LP but very difficult anatomy.  Procedure was unsuccessful.  Resume heparin

## 2024-11-19 NOTE — PROGRESS NOTES
Progress Note - Infectious Disease   Name: Adelina Soto 64 y.o. female I MRN: [id number]  Unit/Bed#: [facility] 01 I Date of Admission: 11/12/2024   Date of Service: 11/19/2024 I Hospital Day: 7    Assessment & Plan  Seizure (HCC)   Patient presented with acute encephalopathy and seizure-like activity.  Video EEG concerning for status epilepticus.  MRI brain without contrast was limited by motion but showed restricted diffusion in the left temporal lobe which could be postictal versus infection.  Status post LP 11/12 and CSF studies unremarkable, not consistent with infection with 2 WBCs.  Protein mildly elevated which could be due to seizure activity itself.  ME panel negative, stand alone HSV PCR also negative.  However with new onset seizures and possible abnormality of the temporal lobe HSV remains a possibility as PCR may be negative and CSF bland and early disease.  CSF culture with no growth.  The patient has grown E. coli and ESBL Proteus in urine culture but in the absence of prior systemic signs of infection or localizing symptoms UTI, doubt this is causing status epilepticus. HIV negative. Fever and leukocytosis improving. Not responsive off sedation              -For now continue IV acyclovir 10 mg/kg every 12 hours              -MRI brain with contrast when able                  -repeat LP pending to reassess CSF; if HSV remains negative and CSF bland then stop acyclovir    -AEDs per neurology  Bacteriuria  UA with pyuria and urine culture growing E. coli and ESBL Proteus.  No symptoms of infection prior to admission.  Unlikely cause of status epilepticus however in absence of clear cause of seizure activity treated for possible cystitis with 3 days zosyn   -monitor off antibiotics   Shock (HCC)  May be multifactorial related to possible infection, sedating medications.  Patient remains on vasopressor support but weaning   -Hemodynamic support per critical care team   -Antimicrobials as above  Acute  hypoxic respiratory failure (HCC)  Patient intubated for airway protection.  Status post bronchoscopy 11/13 for mucous plugging and thick left-sided secretions seen.  Chest x-ray not consistent with pneumonia.  BAL culture shows growth of mixed respiratory viktoriya.   -Ventilator management per critical care team  Morbid obesity with BMI of 50.0-59.9, adult ([facility])  Affects antimicrobial dosing  CKD (chronic kidney disease)  Lab Results   Component Value Date    EGFR 31 11/19/2024    EGFR 30 11/18/2024    EGFR 36 11/17/2024    CREATININE 1.68 (H) 11/19/2024    CREATININE 1.72 (H) 11/18/2024    CREATININE 1.48 (H) 11/17/2024   Baseline creatinine 1.7-1.9.  Creatinine currently at baseline and stable.  Dose adjust antibiotic car wheels as needed for creatinine clearance.  Monitor creatinine daily  Type 2 diabetes mellitus with hyperglycemia, unspecified whether long term insulin use ([facility])  Lab Results   Component Value Date    HGBA1C 5.7 (H) 11/13/2024   Diabetes well-controlled.  Glycemic management per primary team    Above management plan to continue IV acyclovir discussed with Veena Andrew.  Discussed with the patient's partner at bedside.  ID will follow.    Antibiotics:  Acyclovir day 7    Subjective   The patient remains intubated. Fever and leukocytosis improved. Awaiting repeat LP, bedside by critical care not successful yesterday. Not responsive off sedation.    Temp:  [97.2 °F (36.2 °C)-99 °F (37.2 °C)] 98.8 °F (37.1 °C)  HR:  [] 116  BP: ()/(46-83) 75/46  Resp:  [16-32] 16  SpO2:  [84 %-99 %] 99 %  O2 Device: Ventilator    General: Intubated, not responsive   Head: EEG leads in place  Mouth: ET tube in place  Neck: trachea midline   CV: RRR, no murmurs   Lungs: clear to auscultation bilaterally   Abdomen: no distension   Skin: no rashes, cellulitis.  Intertrigo in her skin folds    Lab Results: I have reviewed the following results:  Results from last 7 days   Lab Units 11/19/24  0424 11/18/24  2821  11/17/24  0517   WBC Thousand/uL 6.46 6.04 7.99   HEMOGLOBIN g/dL 9.2* 9.5* 9.8*   PLATELETS Thousands/uL 139* 139* 172     Results from last 7 days   Lab Units 11/19/24  0424 11/18/24  0447 11/17/24  0517 11/16/24  0503 11/15/24  0524   SODIUM mmol/L 138 138 140 137 138   POTASSIUM mmol/L 3.9 3.9 4.2 3.8 3.8   CHLORIDE mmol/L 104 103 108 110* 108   CO2 mmol/L 28 27 24 20* 22   BUN mg/dL 19 18 16 15 14   CREATININE mg/dL 1.68* 1.72* 1.48* 1.47* 1.46*   EGFR ml/min/1.73sq m 31 30 36 37 37   CALCIUM mg/dL 8.1* 8.0* 7.8* 7.3* 7.4*   AST U/L  --   --  15 17 16   ALT U/L  --   --  6* 6* 6*   ALK PHOS U/L  --   --  96 92 95   ALBUMIN g/dL  --   --  2.4* 2.3* 2.5*     Results from last 7 days   Lab Units 11/12/24  2353 11/12/24  2238 11/12/24  2228 11/12/24  2039   BLOOD CULTURE   --  No Growth After 5 Days. No Growth After 5 Days.  --    GRAM STAIN RESULT  1+ Gram negative rods*  1+ Gram positive cocci in clusters*  1+ Polys*  --   --   --    URINE CULTURE   --   --   --  >100,000 cfu/ml Escherichia coli*  >100,000 cfu/ml Proteus mirabilis ESBL*     Results from last 7 days   Lab Units 11/12/24  2132   PROCALCITONIN ng/ml 0.36*       I have personally reviewed pertinent imaging reports and images in PACS. MRI brain without contrast shows left temporal lobe restricted diffusion.

## 2024-11-19 NOTE — ASSESSMENT & PLAN NOTE
Patient presented with acute encephalopathy and seizure-like activity.  Video EEG concerning for status epilepticus.  MRI brain without contrast was limited by motion but showed restricted diffusion in the left temporal lobe which could be postictal versus infection.  Status post LP 11/12 and CSF studies unremarkable, not consistent with infection with 2 WBCs.  Protein mildly elevated which could be due to seizure activity itself.  ME panel negative, stand alone HSV PCR also negative.  However with new onset seizures and possible abnormality of the temporal lobe HSV remains a possibility as PCR may be negative and CSF bland and early disease.  CSF culture with no growth.  The patient has grown E. coli and ESBL Proteus in urine culture but in the absence of prior systemic signs of infection or localizing symptoms UTI, doubt this is causing status epilepticus. HIV negative. Fever and leukocytosis improving. Not responsive off sedation              -For now continue IV acyclovir 10 mg/kg every 12 hours              -MRI brain with contrast when able                  -repeat LP pending to reassess CSF; if HSV remains negative and CSF bland then stop acyclovir    -AEDs per neurology

## 2024-11-19 NOTE — WOUND OSTOMY CARE
Progress Note - Wound   Adelina Soto 64 y.o. female MRN: [id number]  Unit/Bed#: [facility] 01 Encounter: [id number]        Assessment:   Patient is seen for wound care follow-up. Patient seen lying on critical care low air loss mattress. Max assist for turning and repositioning. Dependent for all care needs. Intubated and sedated. Urine managed via internal urinary catheter. Bowel is managed via internal fecal management system.       Findings:  B/L heels are dry intact and jose with no skin loss or wounds present. Recommend preventative Hydraguard Cream and proper offloading/ repositioning.  Offloading boots in place.     B/L Abdomen/Pannus Fold MASD/ITD: scattered areas of partial and full thickness thickness skin loss measured together across deep skin folds. Wound beds with mix of red in color, bleeding and yellow tissue. Azalia-wounds are fragile. Small to moderate serosanguineous drainage noted. Recommend switching to calcium alginate rope.   B/L Breast Fold MASD/ITD: irregular in shape areas of partial thickness skin loss measured together. Wound beds with mix of red in color, bleeding and yellow tissue. Azalia-wounds are fragile. Small to moderate serosanguineous drainage noted. Recommend switching to calcium alginate rope.   B/L Sacro-Buttocks Wound: moisture in etiology but cannot fully rule out pressure component. Scattered areas of partial thickness skin loss located along the intergluteal cleft and right  buttocks measured together. Wound beds with mix of red in color, bleeding tissue, yellow tissue and purple non-blanchable tissue. Azalia-wounds are fragile. Scant sanguinous drainage noted. Recommend triad paste to area.       No induration, fluctuance, odor, warmth/temperature differences, redness, or purulence noted to the above noted wounds and skin areas assessed. New dressings applied per orders listed below. Patient tolerated well- no s/s of non-verbal pain or discomfort observed during the encounter.  Bedside nurse aware of plan of care. See flow sheets for more detailed assessment findings.      Orders listed below and wound care will continue to follow, call or Secure Chat Message with questions.       Skin Care Plan:  1-Cleanse B/L Sacro-Buttocks and Posterior Upper Thighs with soap and water. Apply Triad Paste to open aspects of sacro-buttocks. Apply EARLINE Anti-Fungal Cream to Intact aspects of B/L Sacro-Buttocks and posterior thighs BID and PRN episodes of incontinence  2-Turn/reposition q2h or when medically stable for pressure re-distribution on skin. Utilize ATR turning and repositioning system for patient   3-Elevate heels to offload pressure. Apply offloading boots to b/l feet.   4-Moisturize skin daily with skin nourishing cream  5-Ehob cushion in chair when out of bed.  6-Preventative Hydraguard to bilateral heels BID and PRN.   7-B/L Abdomen/Pannus and Breast Fold Wounds: cleanse with soap and water and pat dry. Apply calcium alginate rope (Melgisorb Plus Cavity) to skin fold. Change daily or PRN soilage/displacement.         WOUNDS:  Wound 12/11/22 MASD Pelvis Anterior;Left (Active)   Wound Image    11/19/24 1013   Wound Description Pink;Beefy red;Drainage;Yellow 11/19/24 1500   Azalia-wound Assessment Fragile 11/19/24 1500   Wound Length (cm) 1 cm 11/19/24 1013   Wound Width (cm) 42 cm 11/19/24 1013   Wound Depth (cm) 0.2 cm 11/19/24 1013   Wound Surface Area (cm^2) 42 cm^2 11/19/24 1013   Wound Volume (cm^3) 8.4 cm^3 11/19/24 1013   Calculated Wound Volume (cm^3) 8.4 cm^3 11/19/24 1013   Drainage Amount Small 11/19/24 1500   Drainage Description Serosanguineous 11/19/24 1500   Non-staged Wound Description Full thickness 11/19/24 1500   Treatments Cleansed;Site care 11/19/24 1500   Dressing Calcium Alginate 11/19/24 1500   Dressing Changed Changed 11/19/24 1500   Patient Tolerance Tolerated well 11/19/24 1500   Dressing Status Dry;Clean;Intact 11/19/24 1500       Wound 11/13/24 Breast  Lateral;Right;Lower (Active)   Wound Image   11/19/24 1015   Wound Description Beefy red;Pink;Fragile;Yellow 11/19/24 1500   Azalia-wound Assessment Fragile 11/19/24 1500   Wound Length (cm) 1 cm 11/19/24 1015   Wound Width (cm) 4 cm 11/19/24 1015   Wound Depth (cm) 0.1 cm 11/19/24 1015   Wound Surface Area (cm^2) 4 cm^2 11/19/24 1015   Wound Volume (cm^3) 0.4 cm^3 11/19/24 1015   Calculated Wound Volume (cm^3) 0.4 cm^3 11/19/24 1015   Change in Wound Size % -60 11/19/24 1015   Drainage Amount Small 11/19/24 1500   Drainage Description Serosanguineous 11/19/24 1500   Non-staged Wound Description Partial thickness 11/19/24 1500   Treatments Cleansed;Site care;Irrigation with NSS 11/19/24 1500   Dressing Calcium Alginate 11/19/24 1500   Dressing Changed New 11/19/24 1500   Patient Tolerance Tolerated well 11/19/24 1500   Dressing Status Clean;Dry;Intact 11/19/24 1500       Wound 11/15/24 MASD Breast Lateral;Left;Inferior (Active)   Wound Image   11/19/24 1019   Wound Description Beefy red;Pink;Yellow;Drainage 11/19/24 1019   Azalia-wound Assessment Fragile 11/19/24 1019   Wound Length (cm) 1 cm 11/19/24 1019   Wound Width (cm) 4 cm 11/19/24 1019   Wound Depth (cm) 0.1 cm 11/19/24 1019   Wound Surface Area (cm^2) 4 cm^2 11/19/24 1019   Wound Volume (cm^3) 0.4 cm^3 11/19/24 1019   Calculated Wound Volume (cm^3) 0.4 cm^3 11/19/24 1019   Drainage Amount Small 11/19/24 1019   Drainage Description Serosanguineous 11/19/24 1019   Non-staged Wound Description Partial thickness 11/19/24 1019   Treatments Cleansed;Irrigation with NSS;Site care 11/19/24 1019   Dressing Calcium Alginate 11/19/24 1019   Dressing Changed Changed 11/19/24 1019   Patient Tolerance Tolerated well 11/19/24 1019   Dressing Status Intact;Dry;Clean 11/19/24 1019       Wound 11/19/24 Pressure Injury Buttocks (Active)   Wound Image   11/19/24 1015   Wound Description Pink;Bleeding;Non-blanchable erythema;Light purple 11/19/24 1015   Azalia-wound Assessment  Fragile 11/19/24 1015   Wound Length (cm) 11 cm 11/19/24 1015   Wound Width (cm) 9 cm 11/19/24 1015   Wound Depth (cm) 0.1 cm 11/19/24 1015   Wound Surface Area (cm^2) 99 cm^2 11/19/24 1015   Wound Volume (cm^3) 9.9 cm^3 11/19/24 1015   Calculated Wound Volume (cm^3) 9.9 cm^3 11/19/24 1015   Drainage Amount Scant 11/19/24 1015   Drainage Description Sanguineous 11/19/24 1015   Non-staged Wound Description Partial thickness 11/19/24 1015   Treatments Cleansed;Irrigation with NSS;Site care 11/19/24 1015   Dressing Protective barrier 11/19/24 1015   Dressing Changed Changed 11/19/24 1015   Patient Tolerance Tolerated well 11/19/24 1015   Dressing Status Dry;Intact;Clean 11/19/24 1015                Rahel Beatty RN, BSN, CWOCN

## 2024-11-19 NOTE — CASE MANAGEMENT
Case Management Discharge Planning Note    [facility]  MRN [id number]  : 1960 Date 2024       Current Admission Date: 2024  Current Admission Diagnosis:Seizure (HCC)   Patient Active Problem List    Diagnosis Date Noted Date Diagnosed    Abnormal EKG 11/15/2024     Elevated troponin level not due myocardial infarction 11/15/2024     Bacteriuria 11/15/2024     Shock (HCC) 11/15/2024     Acute hypoxic respiratory failure (HCC) 11/15/2024     Seizure (HCC) 2024     Diabetic nephropathy associated with type 2 diabetes mellitus (HCC) 2024     Coagulopathy (HCC) 2024     Type 2 diabetes mellitus with hyperglycemia, unspecified whether long term insulin use (HCC) 2024     Chronic anticoagulation 2023     Chronic respiratory failure with hypercapnia ([facility]) 2023     CKD (chronic kidney disease) 2023     Anemia in stage 3b chronic kidney disease  (HCC) 2023     Chronic respiratory failure with hypoxia (HCC) 2023     History of hysterectomy 2022     Panniculitis 2022     Abnormal CT scan 2022     Panniculus 2022     Bilateral pleural effusion 2022     Cardiomegaly 2022     Depression, recurrent (HCC) 2022     Pre-ulcerative corn or callous 2022     Morbid obesity with BMI of 50.0-59.9, adult (HCC) 2021     Secondary hyperparathyroidism of renal origin (HCC) 2021     GERD (gastroesophageal reflux disease) 2020     Anxiety 2020     Chronic constipation 2020     Paroxysmal atrial fibrillation (HCC) 2020     COPD with asthma (HCC) 2020     Hypothyroid 2020     Cellulitis 09/10/2020     H/O right nephrectomy 09/10/2020     Hyperparathyroidism (HCC) 2020     Dyslipidemia 2019     Hypertensive chronic kidney disease with stage 1 through stage 4 chronic kidney disease, or unspecified chronic kidney disease  10/04/2018     Persistent proteinuria 10/04/2018     Iron deficiency 10/04/2018       LOS (days): 7  Geometric Mean LOS (GMLOS) (days): 4.4  Days to GMLOS:-2.3     OBJECTIVE:  Risk of Unplanned Readmission Score: 26.54         Current admission status: Inpatient   Preferred Pharmacy:   [facility]/pharmacy #[id number] - [place] - [address] [address]  Phone: [phone] Fax: [phone]    [facility] [place], [place] - [facility] [facility].  [address]  [address]  [place] [address]  Phone: [phone] Fax: [phone]    Primary Care Provider: TULIO Beaver    Primary Insurance: [facility]  Secondary Insurance: [facility]    DISCHARGE DETAILS:            Other Referral/Resources/Interventions Provided:  Referral Comments: Per chart review: pt lateral transfer from ICU 11/15. EMU. Intubated.

## 2024-11-20 ENCOUNTER — TELEPHONE (OUTPATIENT)
Age: 64
End: 2024-11-20

## 2024-11-20 ENCOUNTER — APPOINTMENT (INPATIENT)
Dept: RADIOLOGY | Facility: HOSPITAL | Age: 64
DRG: 097 | End: 2024-11-20
Payer: COMMERCIAL

## 2024-11-20 ENCOUNTER — APPOINTMENT (INPATIENT)
Dept: NEUROLOGY | Facility: CLINIC | Age: 64
DRG: 097 | End: 2024-11-20
Payer: COMMERCIAL

## 2024-11-20 ENCOUNTER — APPOINTMENT (INPATIENT)
Dept: GASTROENTEROLOGY | Facility: HOSPITAL | Age: 64
DRG: 097 | End: 2024-11-20
Payer: COMMERCIAL

## 2024-11-20 LAB
ANION GAP SERPL CALCULATED.3IONS-SCNC: 6 MMOL/L (ref 4–13)
ANION GAP SERPL CALCULATED.3IONS-SCNC: 7 MMOL/L (ref 4–13)
APTT PPP: 101 SECONDS (ref 23–34)
APTT PPP: 192 SECONDS (ref 23–34)
APTT PPP: 76 SECONDS (ref 23–34)
APTT PPP: >210 SECONDS (ref 23–34)
ARTERIAL PATENCY WRIST A: YES
BASE EXCESS BLDA CALC-SCNC: 4.7 MMOL/L
BUN SERPL-MCNC: 23 MG/DL (ref 5–25)
BUN SERPL-MCNC: 26 MG/DL (ref 5–25)
CALCIUM SERPL-MCNC: 8.4 MG/DL (ref 8.4–10.2)
CALCIUM SERPL-MCNC: 8.6 MG/DL (ref 8.4–10.2)
CHLORIDE SERPL-SCNC: 100 MMOL/L (ref 96–108)
CHLORIDE SERPL-SCNC: 102 MMOL/L (ref 96–108)
CO2 SERPL-SCNC: 31 MMOL/L (ref 21–32)
CO2 SERPL-SCNC: 33 MMOL/L (ref 21–32)
CREAT SERPL-MCNC: 1.57 MG/DL (ref 0.6–1.3)
CREAT SERPL-MCNC: 1.58 MG/DL (ref 0.6–1.3)
ERYTHROCYTE [DISTWIDTH] IN BLOOD BY AUTOMATED COUNT: 16.6 % (ref 11.6–15.1)
GFR SERPL CREATININE-BSD FRML MDRD: 34 ML/MIN/1.73SQ M
GFR SERPL CREATININE-BSD FRML MDRD: 34 ML/MIN/1.73SQ M
GLUCOSE SERPL-MCNC: 115 MG/DL (ref 65–140)
GLUCOSE SERPL-MCNC: 121 MG/DL (ref 65–140)
GLUCOSE SERPL-MCNC: 122 MG/DL (ref 65–140)
GLUCOSE SERPL-MCNC: 134 MG/DL (ref 65–140)
GLUCOSE SERPL-MCNC: 135 MG/DL (ref 65–140)
HCO3 BLDA-SCNC: 31.2 MMOL/L (ref 22–28)
HCT VFR BLD AUTO: 33.1 % (ref 34.8–46.1)
HGB BLD-MCNC: 9.6 G/DL (ref 11.5–15.4)
HOROWITZ INDEX BLDA+IHG-RTO: 40 MM[HG]
MAGNESIUM SERPL-MCNC: 1.9 MG/DL (ref 1.9–2.7)
MAGNESIUM SERPL-MCNC: 2.6 MG/DL (ref 1.9–2.7)
MCH RBC QN AUTO: 31.1 PG (ref 26.8–34.3)
MCHC RBC AUTO-ENTMCNC: 29 G/DL (ref 31.4–37.4)
MCV RBC AUTO: 107 FL (ref 82–98)
NRBC BLD AUTO-RTO: 1 /100 WBCS
O2 CT BLDA-SCNC: 17.2 ML/DL (ref 16–23)
OXYHGB MFR BLDA: 95 % (ref 94–97)
PCO2 BLDA: 54.6 MM HG (ref 36–44)
PEEP RESPIRATORY: 8 CM[H2O]
PH BLDA: 7.38 [PH] (ref 7.35–7.45)
PHOSPHATE SERPL-MCNC: 1.8 MG/DL (ref 2.3–4.1)
PHOSPHATE SERPL-MCNC: 3.7 MG/DL (ref 2.3–4.1)
PLATELET # BLD AUTO: 159 THOUSANDS/UL (ref 149–390)
PMV BLD AUTO: 10.1 FL (ref 8.9–12.7)
PO2 BLDA: 86.5 MM HG (ref 75–129)
POTASSIUM SERPL-SCNC: 3.6 MMOL/L (ref 3.5–5.3)
POTASSIUM SERPL-SCNC: 4.3 MMOL/L (ref 3.5–5.3)
RBC # BLD AUTO: 3.09 MILLION/UL (ref 3.81–5.12)
SODIUM SERPL-SCNC: 138 MMOL/L (ref 135–147)
SODIUM SERPL-SCNC: 141 MMOL/L (ref 135–147)
SPECIMEN SOURCE: ABNORMAL
VALPROATE SERPL-MCNC: 52 UG/ML (ref 50–100)
VENT AC: 16
VENT- AC: AC
VT SETTING VENT: 420 ML
WBC # BLD AUTO: 8.35 THOUSAND/UL (ref 4.31–10.16)

## 2024-11-20 PROCEDURE — 82805 BLOOD GASES W/O2 SATURATION: CPT

## 2024-11-20 PROCEDURE — 80165 DIPROPYLACETIC ACID FREE: CPT

## 2024-11-20 PROCEDURE — 85730 THROMBOPLASTIN TIME PARTIAL: CPT | Performed by: STUDENT IN AN ORGANIZED HEALTH CARE EDUCATION/TRAINING PROGRAM

## 2024-11-20 PROCEDURE — 83735 ASSAY OF MAGNESIUM: CPT

## 2024-11-20 PROCEDURE — 95720 EEG PHY/QHP EA INCR W/VEEG: CPT | Performed by: PSYCHIATRY & NEUROLOGY

## 2024-11-20 PROCEDURE — 80164 ASSAY DIPROPYLACETIC ACD TOT: CPT

## 2024-11-20 PROCEDURE — 70553 MRI BRAIN STEM W/O & W/DYE: CPT

## 2024-11-20 PROCEDURE — 0B9M8ZZ DRAINAGE OF BILATERAL LUNGS, VIA NATURAL OR ARTIFICIAL OPENING ENDOSCOPIC: ICD-10-PCS | Performed by: STUDENT IN AN ORGANIZED HEALTH CARE EDUCATION/TRAINING PROGRAM

## 2024-11-20 PROCEDURE — 0B918ZZ DRAINAGE OF TRACHEA, VIA NATURAL OR ARTIFICIAL OPENING ENDOSCOPIC: ICD-10-PCS | Performed by: STUDENT IN AN ORGANIZED HEALTH CARE EDUCATION/TRAINING PROGRAM

## 2024-11-20 PROCEDURE — 84100 ASSAY OF PHOSPHORUS: CPT | Performed by: STUDENT IN AN ORGANIZED HEALTH CARE EDUCATION/TRAINING PROGRAM

## 2024-11-20 PROCEDURE — 99233 SBSQ HOSP IP/OBS HIGH 50: CPT | Performed by: STUDENT IN AN ORGANIZED HEALTH CARE EDUCATION/TRAINING PROGRAM

## 2024-11-20 PROCEDURE — 95712 VEEG 2-12 HR INTMT MNTR: CPT

## 2024-11-20 PROCEDURE — 94669 MECHANICAL CHEST WALL OSCILL: CPT

## 2024-11-20 PROCEDURE — 36556 INSERT NON-TUNNEL CV CATH: CPT | Performed by: STUDENT IN AN ORGANIZED HEALTH CARE EDUCATION/TRAINING PROGRAM

## 2024-11-20 PROCEDURE — 0B928ZZ DRAINAGE OF CARINA, VIA NATURAL OR ARTIFICIAL OPENING ENDOSCOPIC: ICD-10-PCS | Performed by: STUDENT IN AN ORGANIZED HEALTH CARE EDUCATION/TRAINING PROGRAM

## 2024-11-20 PROCEDURE — 82948 REAGENT STRIP/BLOOD GLUCOSE: CPT

## 2024-11-20 PROCEDURE — 94003 VENT MGMT INPAT SUBQ DAY: CPT

## 2024-11-20 PROCEDURE — 84100 ASSAY OF PHOSPHORUS: CPT

## 2024-11-20 PROCEDURE — 80048 BASIC METABOLIC PNL TOTAL CA: CPT | Performed by: STUDENT IN AN ORGANIZED HEALTH CARE EDUCATION/TRAINING PROGRAM

## 2024-11-20 PROCEDURE — 99291 CRITICAL CARE FIRST HOUR: CPT | Performed by: STUDENT IN AN ORGANIZED HEALTH CARE EDUCATION/TRAINING PROGRAM

## 2024-11-20 PROCEDURE — 94640 AIRWAY INHALATION TREATMENT: CPT

## 2024-11-20 PROCEDURE — 83735 ASSAY OF MAGNESIUM: CPT | Performed by: STUDENT IN AN ORGANIZED HEALTH CARE EDUCATION/TRAINING PROGRAM

## 2024-11-20 PROCEDURE — 31622 DX BRONCHOSCOPE/WASH: CPT | Performed by: STUDENT IN AN ORGANIZED HEALTH CARE EDUCATION/TRAINING PROGRAM

## 2024-11-20 PROCEDURE — A9585 GADOBUTROL INJECTION: HCPCS | Performed by: STUDENT IN AN ORGANIZED HEALTH CARE EDUCATION/TRAINING PROGRAM

## 2024-11-20 PROCEDURE — 94760 N-INVAS EAR/PLS OXIMETRY 1: CPT

## 2024-11-20 PROCEDURE — 94664 DEMO&/EVAL PT USE INHALER: CPT

## 2024-11-20 PROCEDURE — 36600 WITHDRAWAL OF ARTERIAL BLOOD: CPT

## 2024-11-20 PROCEDURE — 85027 COMPLETE CBC AUTOMATED: CPT

## 2024-11-20 PROCEDURE — 80048 BASIC METABOLIC PNL TOTAL CA: CPT

## 2024-11-20 RX ORDER — SODIUM CHLORIDE, SODIUM GLUCONATE, SODIUM ACETATE, POTASSIUM CHLORIDE, MAGNESIUM CHLORIDE, SODIUM PHOSPHATE, DIBASIC, AND POTASSIUM PHOSPHATE .53; .5; .37; .037; .03; .012; .00082 G/100ML; G/100ML; G/100ML; G/100ML; G/100ML; G/100ML; G/100ML
500 INJECTION, SOLUTION INTRAVENOUS ONCE
Status: COMPLETED | OUTPATIENT
Start: 2024-11-20 | End: 2024-11-20

## 2024-11-20 RX ORDER — GADOBUTROL 604.72 MG/ML
12 INJECTION INTRAVENOUS
Status: COMPLETED | OUTPATIENT
Start: 2024-11-20 | End: 2024-11-20

## 2024-11-20 RX ORDER — METOPROLOL TARTRATE 25 MG/1
25 TABLET, FILM COATED ORAL EVERY 12 HOURS SCHEDULED
Status: DISCONTINUED | OUTPATIENT
Start: 2024-11-20 | End: 2024-11-24

## 2024-11-20 RX ORDER — MAGNESIUM SULFATE HEPTAHYDRATE 40 MG/ML
2 INJECTION, SOLUTION INTRAVENOUS ONCE
Status: COMPLETED | OUTPATIENT
Start: 2024-11-20 | End: 2024-11-20

## 2024-11-20 RX ADMIN — LEVALBUTEROL HYDROCHLORIDE 1.25 MG: 1.25 SOLUTION RESPIRATORY (INHALATION) at 07:54

## 2024-11-20 RX ADMIN — METOPROLOL TARTRATE 25 MG: 25 TABLET, FILM COATED ORAL at 22:02

## 2024-11-20 RX ADMIN — VALPROATE SODIUM 500 MG: 100 INJECTION, SOLUTION INTRAVENOUS at 15:02

## 2024-11-20 RX ADMIN — THIAMINE HYDROCHLORIDE 500 MG: 100 INJECTION, SOLUTION INTRAMUSCULAR; INTRAVENOUS at 09:09

## 2024-11-20 RX ADMIN — ACYCLOVIR SODIUM 775 MG: 50 INJECTION, SOLUTION INTRAVENOUS at 22:34

## 2024-11-20 RX ADMIN — LEVOTHYROXINE SODIUM 100 MCG: 100 TABLET ORAL at 05:36

## 2024-11-20 RX ADMIN — CHLORHEXIDINE GLUCONATE 0.12% ORAL RINSE 15 ML: 1.2 LIQUID ORAL at 22:01

## 2024-11-20 RX ADMIN — HEPARIN SODIUM 15 UNITS/KG/HR: 10000 INJECTION, SOLUTION INTRAVENOUS at 06:34

## 2024-11-20 RX ADMIN — Medication 12.5 MG: at 08:56

## 2024-11-20 RX ADMIN — GADOBUTROL 12 ML: 604.72 INJECTION INTRAVENOUS at 23:47

## 2024-11-20 RX ADMIN — HEPARIN SODIUM 10 UNITS/KG/HR: 10000 INJECTION, SOLUTION INTRAVENOUS at 22:53

## 2024-11-20 RX ADMIN — LEVETIRACETAM 750 MG: 100 INJECTION, SOLUTION INTRAVENOUS at 09:00

## 2024-11-20 RX ADMIN — ATORVASTATIN CALCIUM 10 MG: 10 TABLET, FILM COATED ORAL at 15:54

## 2024-11-20 RX ADMIN — MICONAZOLE NITRATE: 20 CREAM TOPICAL at 17:48

## 2024-11-20 RX ADMIN — FAMOTIDINE 20 MG: 20 TABLET, FILM COATED ORAL at 08:56

## 2024-11-20 RX ADMIN — VALPROATE SODIUM 500 MG: 100 INJECTION, SOLUTION INTRAVENOUS at 04:14

## 2024-11-20 RX ADMIN — THIAMINE HYDROCHLORIDE 500 MG: 100 INJECTION, SOLUTION INTRAMUSCULAR; INTRAVENOUS at 22:06

## 2024-11-20 RX ADMIN — CHLORHEXIDINE GLUCONATE 0.12% ORAL RINSE 15 ML: 1.2 LIQUID ORAL at 08:56

## 2024-11-20 RX ADMIN — MICONAZOLE NITRATE: 20 CREAM TOPICAL at 08:57

## 2024-11-20 RX ADMIN — POTASSIUM PHOSPHATE, MONOBASIC POTASSIUM PHOSPHATE, DIBASIC 30 MMOL: 224; 236 INJECTION, SOLUTION, CONCENTRATE INTRAVENOUS at 08:51

## 2024-11-20 RX ADMIN — THIAMINE HYDROCHLORIDE 500 MG: 100 INJECTION, SOLUTION INTRAMUSCULAR; INTRAVENOUS at 15:02

## 2024-11-20 RX ADMIN — SERTRALINE HYDROCHLORIDE 50 MG: 50 TABLET ORAL at 08:56

## 2024-11-20 RX ADMIN — SODIUM CHLORIDE, SODIUM GLUCONATE, SODIUM ACETATE, POTASSIUM CHLORIDE, MAGNESIUM CHLORIDE, SODIUM PHOSPHATE, DIBASIC, AND POTASSIUM PHOSPHATE 500 ML: .53; .5; .37; .037; .03; .012; .00082 INJECTION, SOLUTION INTRAVENOUS at 11:19

## 2024-11-20 RX ADMIN — LEVETIRACETAM 750 MG: 100 INJECTION, SOLUTION INTRAVENOUS at 22:01

## 2024-11-20 RX ADMIN — LEVALBUTEROL HYDROCHLORIDE 1.25 MG: 1.25 SOLUTION RESPIRATORY (INHALATION) at 19:28

## 2024-11-20 RX ADMIN — VALPROATE SODIUM 500 MG: 100 INJECTION, SOLUTION INTRAVENOUS at 10:21

## 2024-11-20 RX ADMIN — LEVALBUTEROL HYDROCHLORIDE 1.25 MG: 1.25 SOLUTION RESPIRATORY (INHALATION) at 13:38

## 2024-11-20 RX ADMIN — MAGNESIUM SULFATE HEPTAHYDRATE 2 G: 40 INJECTION, SOLUTION INTRAVENOUS at 08:49

## 2024-11-20 RX ADMIN — ACYCLOVIR SODIUM 775 MG: 50 INJECTION, SOLUTION INTRAVENOUS at 10:15

## 2024-11-20 RX ADMIN — VALPROATE SODIUM 500 MG: 100 INJECTION, SOLUTION INTRAVENOUS at 22:45

## 2024-11-20 NOTE — RESPIRATORY THERAPY NOTE
RT Ventilator Management Note  Adelina Soto 64 y.o. female MRN: [id number]  [facility] Encounter: [id number]      Daily Screen         11/19/2024  0705 11/20/2024  0754          Patient safety screen outcome:: Failed Failed (P)       Not Ready for Weaning due to:: Underline problem not resolved --                Physical Exam:   Bilateral Breath Sounds: (P) Diminished  Suction: (P) ET Tube      Resp Comments: (P) Pt remains sedated. RR increased to 18 after morning ABG results. No wean due to pts condition. Will continue to monitor pt. Endotube found at 21cm, advanced to 23cm.

## 2024-11-20 NOTE — PROCEDURES
Venous Access Line Insertion    Date/Time: 11/20/2024 4:56 PM    Performed by: Shaun Ovalle DO  Authorized by: Shaun Ovalle DO    Patient location:  Bedside  Other Assisting Provider: Yes (comment) (Dr. Gomes)    Consent:     Consent obtained:  Emergent situation  Universal protocol:     Procedure explained and questions answered to patient or proxy's satisfaction: no      Immediately prior to procedure, a time out was called: no    Pre-procedure details:     Hand hygiene: Hand hygiene performed prior to insertion      Sterile barrier technique: All elements of maximal sterile technique followed      Skin preparation:  ChloraPrep    Skin preparation agent: Skin preparation agent completely dried prior to procedure    Procedure details:     Complex Venous Access Line Type: US Guided Peripheral IV      Peripheral IV Indications comment:  Need for access    Orientation:  Right    Location:  Antecubital    Catheter size:  18 gauge    Patient evaluated for contraindications to access (i.e. fistula, thrombosis, etc): Yes      Patient position:  Flat    Ultrasound image availability:  Not saved    Sterile ultrasound techniques: Sterile gel and sterile probe covers were used      Number of attempts:  1    Successful placement: yes    Anesthesia (see MAR for exact dosages):     Anesthesia method:  None  Post-procedure details:     Post-procedure:  Dressing applied    Assessment:  Blood return through all ports and free fluid flow    Patient tolerance of procedure:  Tolerated well, no immediate complications

## 2024-11-20 NOTE — PLAN OF CARE
Problem: PAIN - ADULT  Goal: Verbalizes/displays adequate comfort level or baseline comfort level  Description: Interventions:  - Encourage patient to monitor pain and request assistance  - Assess pain using appropriate pain scale  - Administer analgesics based on type and severity of pain and evaluate response  - Implement non-pharmacological measures as appropriate and evaluate response  - Consider cultural and social influences on pain and pain management  - Notify physician/advanced practitioner if interventions unsuccessful or patient reports new pain  Outcome: Progressing     Problem: INFECTION - ADULT  Goal: Absence or prevention of progression during hospitalization  Description: INTERVENTIONS:  - Assess and monitor for signs and symptoms of infection  - Monitor lab/diagnostic results  - Monitor all insertion sites, i.e. indwelling lines, tubes, and drains  - Monitor endotracheal if appropriate and nasal secretions for changes in amount and color  - [place] appropriate cooling/warming therapies per order  - Administer medications as ordered  - Instruct and encourage patient and family to use good hand hygiene technique  - Identify and instruct in appropriate isolation precautions for identified infection/condition  Outcome: Progressing  Goal: Absence of fever/infection during neutropenic period  Description: INTERVENTIONS:  - Monitor WBC    Outcome: Progressing     Problem: SAFETY ADULT  Goal: Patient will remain free of falls  Description: INTERVENTIONS:  - Educate patient/family on patient safety including physical limitations  - Instruct patient to call for assistance with activity   - Consult OT/PT to assist with strengthening/mobility   - Keep Call bell within reach  - Keep bed low and locked with side rails adjusted as appropriate  - Keep care items and personal belongings within reach  - Initiate and maintain comfort rounds  - Make Fall Risk Sign visible to staff  - Offer Toileting every  Hours,  in advance of need  - Initiate/Maintain alarm  - Obtain necessary fall risk management equipment:   - Apply yellow socks and bracelet for high fall risk patients  - Consider moving patient to room near nurses station  Outcome: Progressing  Goal: Maintain or return to baseline ADL function  Description: INTERVENTIONS:  -  Assess patient's ability to carry out ADLs; assess patient's baseline for ADL function and identify physical deficits which impact ability to perform ADLs (bathing, care of mouth/teeth, toileting, grooming, dressing, etc.)  - Assess/evaluate cause of self-care deficits   - Assess range of motion  - Assess patient's mobility; develop plan if impaired  - Assess patient's need for assistive devices and provide as appropriate  - Encourage maximum independence but intervene and supervise when necessary  - Involve family in performance of ADLs  - Assess for home care needs following discharge   - Consider OT consult to assist with ADL evaluation and planning for discharge  - Provide patient education as appropriate  Outcome: Progressing  Goal: Maintains/Returns to pre admission functional level  Description: INTERVENTIONS:  - Perform AM-PAC 6 Click Basic Mobility/ Daily Activity assessment daily.  - Set and communicate daily mobility goal to care team and patient/family/caregiver.   - Collaborate with rehabilitation services on mobility goals if consulted  - Perform Range of Motion  times a day.  - Reposition patient every hours.  - Dangle patient  times a day  - Stand patient  times a day  - Ambulate patient  times a day  - Out of bed to chair  times a day   - Out of bed for meals  times a day  - Out of bed for toileting  - Record patient progress and toleration of activity level   Outcome: Progressing     Problem: DISCHARGE PLANNING  Goal: Discharge to home or other facility with appropriate resources  Description: INTERVENTIONS:  - Identify barriers to discharge w/patient and caregiver  - Arrange for  needed discharge resources and transportation as appropriate  - Identify discharge learning needs (meds, wound care, etc.)  - Arrange for interpretive services to assist at discharge as needed  - Refer to Case Management Department for coordinating discharge planning if the patient needs post-hospital services based on physician/advanced practitioner order or complex needs related to functional status, cognitive ability, or social support system  Outcome: Progressing     Problem: Knowledge Deficit  Goal: Patient/family/caregiver demonstrates understanding of disease process, treatment plan, medications, and discharge instructions  Description: Complete learning assessment and assess knowledge base.  Interventions:  - Provide teaching at level of understanding  - Provide teaching via preferred learning methods  Outcome: Progressing     Problem: NEUROSENSORY - ADULT  Goal: Achieves stable or improved neurological status  Description: INTERVENTIONS  - Monitor and report changes in neurological status  - Monitor vital signs such as temperature, blood pressure, glucose, and any other labs ordered   - Initiate measures to prevent increased intracranial pressure  - Monitor for seizure activity and implement precautions if appropriate      Outcome: Progressing  Goal: Remains free of injury related to seizures activity  Description: INTERVENTIONS  - Maintain airway, patient safety  and administer oxygen as ordered  - Monitor patient for seizure activity, document and report duration and description of seizure to physician/advanced practitioner  - If seizure occurs,  ensure patient safety during seizure  - Reorient patient post seizure  - Seizure pads on all 4 side rails  - Instruct patient/family to notify RN of any seizure activity including if an aura is experienced  - Instruct patient/family to call for assistance with activity based on nursing assessment  - Administer anti-seizure medications if ordered    Outcome:  Progressing  Goal: Achieves maximal functionality and self care  Description: INTERVENTIONS  - Monitor swallowing and airway patency with patient fatigue and changes in neurological status  - Encourage and assist patient to increase activity and self care.   - Encourage visually impaired, hearing impaired and aphasic patients to use assistive/communication devices  Outcome: Progressing     Problem: CARDIOVASCULAR - ADULT  Goal: Maintains optimal cardiac output and hemodynamic stability  Description: INTERVENTIONS:  - Monitor I/O, vital signs and rhythm  - Monitor for S/S and trends of decreased cardiac output  - Administer and titrate ordered vasoactive medications to optimize hemodynamic stability  - Assess quality of pulses, skin color and temperature  - Assess for signs of decreased coronary artery perfusion  - Instruct patient to report change in severity of symptoms  Outcome: Progressing  Goal: Absence of cardiac dysrhythmias or at baseline rhythm  Description: INTERVENTIONS:  - Continuous cardiac monitoring, vital signs, obtain 12 lead EKG if ordered  - Administer antiarrhythmic and heart rate control medications as ordered  - Monitor electrolytes and administer replacement therapy as ordered  Outcome: Progressing     Problem: RESPIRATORY - ADULT  Goal: Achieves optimal ventilation and oxygenation  Description: INTERVENTIONS:  - Assess for changes in respiratory status  - Assess for changes in mentation and behavior  - Position to facilitate oxygenation and minimize respiratory effort  - Oxygen administered by appropriate delivery if ordered  - Initiate smoking cessation education as indicated  - Encourage broncho-pulmonary hygiene including cough, deep breathe, Incentive Spirometry  - Assess the need for suctioning and aspirate as needed  - Assess and instruct to report SOB or any respiratory difficulty  - Respiratory Therapy support as indicated  Outcome: Progressing     Problem: GASTROINTESTINAL -  ADULT  Goal: Minimal or absence of nausea and/or vomiting  Description: INTERVENTIONS:  - Administer IV fluids if ordered to ensure adequate hydration  - Maintain NPO status until nausea and vomiting are resolved  - Nasogastric tube if ordered  - Administer ordered antiemetic medications as needed  - Provide nonpharmacologic comfort measures as appropriate  - Advance diet as tolerated, if ordered  - Consider nutrition services referral to assist patient with adequate nutrition and appropriate food choices  Outcome: Progressing  Goal: Maintains or returns to baseline bowel function  Description: INTERVENTIONS:  - Assess bowel function  - Encourage oral fluids to ensure adequate hydration  - Administer IV fluids if ordered to ensure adequate hydration  - Administer ordered medications as needed  - Encourage mobilization and activity  - Consider nutritional services referral to assist patient with adequate nutrition and appropriate food choices  Outcome: Progressing  Goal: Maintains adequate nutritional intake  Description: INTERVENTIONS:  - Monitor percentage of each meal consumed  - Identify factors contributing to decreased intake, treat as appropriate  - Assist with meals as needed  - Monitor I&O, weight, and lab values if indicated  - Obtain nutrition services referral as needed  Outcome: Progressing  Goal: Establish and maintain optimal ostomy function  Description: INTERVENTIONS:  - Assess bowel function  - Encourage oral fluids to ensure adequate hydration  - Administer IV fluids if ordered to ensure adequate hydration   - Administer ordered medications as needed  - Encourage mobilization and activity  - Nutrition services referral to assist patient with appropriate food choices  - Assess stoma site  - Consider wound care consult   Outcome: Progressing  Goal: Oral mucous membranes remain intact  Description: INTERVENTIONS  - Assess oral mucosa and hygiene practices  - Implement preventative oral hygiene  regimen  - Implement oral medicated treatments as ordered  - Initiate Nutrition services referral as needed  Outcome: Progressing     Problem: GENITOURINARY - ADULT  Goal: Maintains or returns to baseline urinary function  Description: INTERVENTIONS:  - Assess urinary function  - Encourage oral fluids to ensure adequate hydration if ordered  - Administer IV fluids as ordered to ensure adequate hydration  - Administer ordered medications as needed  - Offer frequent toileting  - Follow urinary retention protocol if ordered  Outcome: Progressing  Goal: Absence of urinary retention  Description: INTERVENTIONS:  - Assess patient’s ability to void and empty bladder  - Monitor I/O  - Bladder scan as needed  - Discuss with physician/AP medications to alleviate retention as needed  - Discuss catheterization for long term situations as appropriate  Outcome: Progressing  Goal: Urinary catheter remains patent  Description: INTERVENTIONS:  - Assess patency of urinary catheter  - If patient has a chronic peace, consider changing catheter if non-functioning  - Follow guidelines for intermittent irrigation of non-functioning urinary catheter  Outcome: Progressing     Problem: METABOLIC, FLUID AND ELECTROLYTES - ADULT  Goal: Electrolytes maintained within normal limits  Description: INTERVENTIONS:  - Monitor labs and assess patient for signs and symptoms of electrolyte imbalances  - Administer electrolyte replacement as ordered  - Monitor response to electrolyte replacements, including repeat lab results as appropriate  - Instruct patient on fluid and nutrition as appropriate  Outcome: Progressing  Goal: Fluid balance maintained  Description: INTERVENTIONS:  - Monitor labs   - Monitor I/O and WT  - Instruct patient on fluid and nutrition as appropriate  - Assess for signs & symptoms of volume excess or deficit  Outcome: Progressing  Goal: Glucose maintained within target range  Description: INTERVENTIONS:  - Monitor Blood Glucose as  ordered  - Assess for signs and symptoms of hyperglycemia and hypoglycemia  - Administer ordered medications to maintain glucose within target range  - Assess nutritional intake and initiate nutrition service referral as needed  Outcome: Progressing     Problem: SKIN/TISSUE INTEGRITY - ADULT  Goal: Skin Integrity remains intact(Skin Breakdown Prevention)  Description: Assess:  -Perform Carmelo assessment every   -Clean and moisturize skin every   -Inspect skin when repositioning, toileting, and assisting with ADLS  -Assess under medical devices such as  every   -Assess extremities for adequate circulation and sensation     Bed Management:  -Have minimal linens on bed & keep smooth, unwrinkled  -Change linens as needed when moist or perspiring  -Avoid sitting or lying in one position for more than  hours while in bed  -Keep HOB at degrees     Toileting:  -Offer bedside commode  -Assess for incontinence every   -Use incontinent care products after each incontinent episode such as     Activity:  -Mobilize patient  times a day  -Encourage activity and walks on unit  -Encourage or provide ROM exercises   -Turn and reposition patient every  Hours  -Use appropriate equipment to lift or move patient in bed  -Instruct/ Assist with weight shifting every  when out of bed in chair  -Consider limitation of chair time  hour intervals    Skin Care:  -Avoid use of baby powder, tape, friction and shearing, hot water or constrictive clothing  -Relieve pressure over bony prominences using  -Do not massage red bony areas    Next Steps:  -Teach patient strategies to minimize risks such as    -Consider consults to  interdisciplinary teams such as   Outcome: Progressing  Goal: Incision(s), wounds(s) or drain site(s) healing without S/S of infection  Description: INTERVENTIONS  - Assess and document dressing, incision, wound bed, drain sites and surrounding tissue  - Provide patient and family education  - Perform skin care/dressing changes  every   Outcome: Progressing  Goal: Pressure injury heals and does not worsen  Description: Interventions:  - Implement low air loss mattress or specialty surface (Criteria met)  - Apply silicone foam dressing  - Instruct/assist with weight shifting every  minutes when in chair   - Limit chair time to  hour intervals  - Use special pressure reducing interventions such as  when in chair   - Apply fecal or urinary incontinence containment device   - Perform passive or active ROM every   - Turn and reposition patient & offload bony prominences every  hours   - Utilize friction reducing device or surface for transfers   - Consider consults to  interdisciplinary teams such as   - Use incontinent care products after each incontinent episode such as  - Consider nutrition services referral as needed  Outcome: Progressing     Problem: HEMATOLOGIC - ADULT  Goal: Maintains hematologic stability  Description: INTERVENTIONS  - Assess for signs and symptoms of bleeding or hemorrhage  - Monitor labs  - Administer supportive blood products/factors as ordered and appropriate  Outcome: Progressing     Problem: MUSCULOSKELETAL - ADULT  Goal: Maintain or return mobility to safest level of function  Description: INTERVENTIONS:  - Assess patient's ability to carry out ADLs; assess patient's baseline for ADL function and identify physical deficits which impact ability to perform ADLs (bathing, care of mouth/teeth, toileting, grooming, dressing, etc.)  - Assess/evaluate cause of self-care deficits   - Assess range of motion  - Assess patient's mobility  - Assess patient's need for assistive devices and provide as appropriate  - Encourage maximum independence but intervene and supervise when necessary  - Involve family in performance of ADLs  - Assess for home care needs following discharge   - Consider OT consult to assist with ADL evaluation and planning for discharge  - Provide patient education as appropriate  Outcome:  Progressing  Goal: Maintain proper alignment of affected bodypart  Description: INTERVENTIONS:  - Support, maintain and protect limb and body alignment  - Provide patient/ family with appropriate education  Outcome: Progressing     Problem: Nutrition/Hydration-ADULT  Goal: Nutrient/Hydration intake appropriate for improving, restoring or maintaining nutritional needs  Description: Monitor and assess patient's nutrition/hydration status for malnutrition. Collaborate with interdisciplinary team and initiate plan and interventions as ordered.  Monitor patient's weight and dietary intake as ordered or per policy. Utilize nutrition screening tool and intervene as necessary. Determine patient's food preferences and provide high-protein, high-caloric foods as appropriate.     INTERVENTIONS:  - Monitor oral intake, urinary output, labs, and treatment plans  - Assess nutrition and hydration status and recommend course of action  - Evaluate amount of meals eaten  - Assist patient with eating if necessary   - Allow adequate time for meals  - Recommend/ encourage appropriate diets, oral nutritional supplements, and vitamin/mineral supplements  - Order, calculate, and assess calorie counts as needed  - Recommend, monitor, and adjust tube feedings and TPN/PPN based on assessed needs  - Assess need for intravenous fluids  - Provide specific nutrition/hydration education as appropriate  - Include patient/family/caregiver in decisions related to nutrition  Outcome: Progressing     Problem: Prexisting or High Potential for Compromised Skin Integrity  Goal: Skin integrity is maintained or improved  Description: INTERVENTIONS:  - Identify patients at risk for skin breakdown  - Assess and monitor skin integrity  - Assess and monitor nutrition and hydration status  - Monitor labs   - Assess for incontinence   - Turn and reposition patient  - Assist with mobility/ambulation  - Relieve pressure over bony prominences  - Avoid friction and  shearing  - Provide appropriate hygiene as needed including keeping skin clean and dry  - Evaluate need for skin moisturizer/barrier cream  - Collaborate with interdisciplinary team   - Patient/family teaching  - Consider wound care consult   Outcome: Progressing     Problem: SAFETY,RESTRAINT: NV/NON-SELF DESTRUCTIVE BEHAVIOR  Goal: Remains free of harm/injury (restraint for non violent/non self-detsructive behavior)  Description: INTERVENTIONS:  - Instruct patient/family regarding restraint use   - Assess and monitor physiologic and psychological status   - Provide interventions and comfort measures to meet assessed patient needs   - Identify and implement measures to help patient regain control  - Assess readiness for release of restraint   Outcome: Progressing  Goal: Returns to optimal restraint-free functioning  Description: INTERVENTIONS:  - Assess the patient's behavior and symptoms that indicate continued need for restraint  - Identify and implement measures to help patient regain control  - Assess readiness for release of restraint   Outcome: Progressing

## 2024-11-20 NOTE — ASSESSMENT & PLAN NOTE
Adelina Soto is a 64 year old woman with PMHx including HTN, HLD, hypothyroidism, A fib on Warfarin, DM, GERD, anxiety who presented to the hospital for evaluation of altered mental status. Stroke alert called due to gaze deviation and altered mentation. Pt was noted to have two witnessed seizures prior to arrival, and received 6 mg Versed prior to arrival. Continued to have left gaze preference for ED, with lack of command following although was moving her LUE spontaneously. CT head and CTA H/N with no findings that could explain her presentation. After return from CT scan, appeared to have some improvement in her exam but still off from her baseline on discussion with sister at bedside. Concern for status epilepticus with multiple seizures without return to baseline as of yet. Otherwise compliant with Coumadin, with INR 2.42.     VEEG monitoring report of 08:00 11/13: 2:20 this morning and around 02:40 started to have repetitive left frontal seizures, every 10 seconds at times. She was started on sedation again shortly after 03:00 and seizures stopped at that point. EEG is now very low amplitude and slow, with frequent left temporal sharps.     VEEG 24 hrs AM 11:17: EEG is still discontinuous and mostly polymorphic delta activities. Still with some left frontal and left temporal sharps, but no seizures.    MRI: Limited by motion. Mild restricted diffusion with abnormal FLAIR signal in the mesial left temporal lobe could be postictal. Recommend correlation with CSF to exclude infection such as HSV. Abnormal signal in the right thalamus of uncertain etiology.     Impression: status epilepticus, which required multiple AED & sedation medications to control in setting of no history of seizures & currently unknown etiology    Plan:   - Repeat LP under fluoro unsuccessful so tx w/ acyc empirically  - VPA 500mg q6h remain  - keppra 750mg q12h remain  - DC vEEG as no further need or ASM adjustments  - Seizure  precautions  - Therapy if able  - Systolic BP goal < 180  - Rest of care per primary

## 2024-11-20 NOTE — ASSESSMENT & PLAN NOTE
Lab Results   Component Value Date    EGFR 34 11/20/2024    EGFR 34 11/20/2024    EGFR 32 11/19/2024    CREATININE 1.57 (H) 11/20/2024    CREATININE 1.58 (H) 11/20/2024    CREATININE 1.63 (H) 11/19/2024   Baseline creatinine 1.7-1.9.  Creatinine stable, slightly below baseline.  Dose adjust antibiotics as needed for creatinine clearance.  Monitor creatinine daily

## 2024-11-20 NOTE — ASSESSMENT & PLAN NOTE
Patient presented with acute encephalopathy and seizure-like activity.  Video EEG concerning for status epilepticus.  MRI brain without contrast was limited by motion but showed restricted diffusion in the left temporal lobe which could be postictal versus infection.  Status post LP 11/12 and CSF studies unremarkable, not consistent with infection with 2 WBCs.  Protein mildly elevated which could be due to seizure activity itself.  ME panel negative, stand alone HSV PCR also negative.  However with new onset seizures and possible abnormality of the temporal lobe HSV remains a possibility as PCR may be negative and CSF bland and early disease.  CSF culture with no growth.  The patient has grown E. coli and ESBL Proteus in urine culture but in the absence of prior systemic signs of infection or localizing symptoms UTI, doubt this is causing status epilepticus. HIV negative. Fever and leukocytosis improving.  Remains poorly responsive off sedation but having some reflexes today.  IR unable to perform patient's LP due to technical difficulty.              -continue IV acyclovir 10 mg/kg every 12 hours.  Will complete an empiric 14-day course since unable to perform LP              -MRI brain with contrast pending   -AEDs per neurology

## 2024-11-20 NOTE — PLAN OF CARE
Problem: PAIN - ADULT  Goal: Verbalizes/displays adequate comfort level or baseline comfort level  Description: Interventions:  - Encourage patient to monitor pain and request assistance  - Assess pain using appropriate pain scale  - Administer analgesics based on type and severity of pain and evaluate response  - Implement non-pharmacological measures as appropriate and evaluate response  - Consider cultural and social influences on pain and pain management  - Notify physician/advanced practitioner if interventions unsuccessful or patient reports new pain  Outcome: Progressing     Problem: INFECTION - ADULT  Goal: Absence or prevention of progression during hospitalization  Description: INTERVENTIONS:  - Assess and monitor for signs and symptoms of infection  - Monitor lab/diagnostic results  - Monitor all insertion sites, i.e. indwelling lines, tubes, and drains  - Monitor endotracheal if appropriate and nasal secretions for changes in amount and color  - [place] appropriate cooling/warming therapies per order  - Administer medications as ordered  - Instruct and encourage patient and family to use good hand hygiene technique  - Identify and instruct in appropriate isolation precautions for identified infection/condition  Outcome: Progressing  Goal: Absence of fever/infection during neutropenic period  Description: INTERVENTIONS:  - Monitor WBC    Outcome: Progressing     Problem: SAFETY ADULT  Goal: Patient will remain free of falls  Description: INTERVENTIONS:  - Educate patient/family on patient safety including physical limitations  - Instruct patient to call for assistance with activity   - Consult OT/PT to assist with strengthening/mobility   - Keep Call bell within reach  - Keep bed low and locked with side rails adjusted as appropriate  - Keep care items and personal belongings within reach  - Initiate and maintain comfort rounds  - Make Fall Risk Sign visible to staff  - Apply yellow socks and bracelet  for high fall risk patients  - Consider moving patient to room near nurses station  Outcome: Progressing  Goal: Maintain or return to baseline ADL function  Description: INTERVENTIONS:  -  Assess patient's ability to carry out ADLs; assess patient's baseline for ADL function and identify physical deficits which impact ability to perform ADLs (bathing, care of mouth/teeth, toileting, grooming, dressing, etc.)  - Assess/evaluate cause of self-care deficits   - Assess range of motion  - Assess patient's mobility; develop plan if impaired  - Assess patient's need for assistive devices and provide as appropriate  - Encourage maximum independence but intervene and supervise when necessary  - Involve family in performance of ADLs  - Assess for home care needs following discharge   - Consider OT consult to assist with ADL evaluation and planning for discharge  - Provide patient education as appropriate  Outcome: Progressing  Goal: Maintains/Returns to pre admission functional level  Description: INTERVENTIONS:  - Perform AM-PAC 6 Click Basic Mobility/ Daily Activity assessment daily.  - Set and communicate daily mobility goal to care team and patient/family/caregiver.   - Collaborate with rehabilitation services on mobility goals if consulted  - Record patient progress and toleration of activity level   Outcome: Progressing     Problem: DISCHARGE PLANNING  Goal: Discharge to home or other facility with appropriate resources  Description: INTERVENTIONS:  - Identify barriers to discharge w/patient and caregiver  - Arrange for needed discharge resources and transportation as appropriate  - Identify discharge learning needs (meds, wound care, etc.)  - Arrange for interpretive services to assist at discharge as needed  - Refer to Case Management Department for coordinating discharge planning if the patient needs post-hospital services based on physician/advanced practitioner order or complex needs related to functional status,  cognitive ability, or social support system  Outcome: Progressing     Problem: Knowledge Deficit  Goal: Patient/family/caregiver demonstrates understanding of disease process, treatment plan, medications, and discharge instructions  Description: Complete learning assessment and assess knowledge base.  Interventions:  - Provide teaching at level of understanding  - Provide teaching via preferred learning methods  Outcome: Progressing     Problem: NEUROSENSORY - ADULT  Goal: Achieves stable or improved neurological status  Description: INTERVENTIONS  - Monitor and report changes in neurological status  - Monitor vital signs such as temperature, blood pressure, glucose, and any other labs ordered   - Initiate measures to prevent increased intracranial pressure  - Monitor for seizure activity and implement precautions if appropriate      Outcome: Progressing  Goal: Remains free of injury related to seizures activity  Description: INTERVENTIONS  - Maintain airway, patient safety  and administer oxygen as ordered  - Monitor patient for seizure activity, document and report duration and description of seizure to physician/advanced practitioner  - If seizure occurs,  ensure patient safety during seizure  - Reorient patient post seizure  - Seizure pads on all 4 side rails  - Instruct patient/family to notify RN of any seizure activity including if an aura is experienced  - Instruct patient/family to call for assistance with activity based on nursing assessment  - Administer anti-seizure medications if ordered    Outcome: Progressing  Goal: Achieves maximal functionality and self care  Description: INTERVENTIONS  - Monitor swallowing and airway patency with patient fatigue and changes in neurological status  - Encourage and assist patient to increase activity and self care.   - Encourage visually impaired, hearing impaired and aphasic patients to use assistive/communication devices  Outcome: Progressing     Problem:  CARDIOVASCULAR - ADULT  Goal: Maintains optimal cardiac output and hemodynamic stability  Description: INTERVENTIONS:  - Monitor I/O, vital signs and rhythm  - Monitor for S/S and trends of decreased cardiac output  - Administer and titrate ordered vasoactive medications to optimize hemodynamic stability  - Assess quality of pulses, skin color and temperature  - Assess for signs of decreased coronary artery perfusion  - Instruct patient to report change in severity of symptoms  Outcome: Progressing  Goal: Absence of cardiac dysrhythmias or at baseline rhythm  Description: INTERVENTIONS:  - Continuous cardiac monitoring, vital signs, obtain 12 lead EKG if ordered  - Administer antiarrhythmic and heart rate control medications as ordered  - Monitor electrolytes and administer replacement therapy as ordered  Outcome: Progressing     Problem: RESPIRATORY - ADULT  Goal: Achieves optimal ventilation and oxygenation  Description: INTERVENTIONS:  - Assess for changes in respiratory status  - Assess for changes in mentation and behavior  - Position to facilitate oxygenation and minimize respiratory effort  - Oxygen administered by appropriate delivery if ordered  - Initiate smoking cessation education as indicated  - Encourage broncho-pulmonary hygiene including cough, deep breathe, Incentive Spirometry  - Assess the need for suctioning and aspirate as needed  - Assess and instruct to report SOB or any respiratory difficulty  - Respiratory Therapy support as indicated  Outcome: Progressing     Problem: GASTROINTESTINAL - ADULT  Goal: Minimal or absence of nausea and/or vomiting  Description: INTERVENTIONS:  - Administer IV fluids if ordered to ensure adequate hydration  - Maintain NPO status until nausea and vomiting are resolved  - Nasogastric tube if ordered  - Administer ordered antiemetic medications as needed  - Provide nonpharmacologic comfort measures as appropriate  - Advance diet as tolerated, if ordered  - Consider  nutrition services referral to assist patient with adequate nutrition and appropriate food choices  Outcome: Progressing  Goal: Maintains or returns to baseline bowel function  Description: INTERVENTIONS:  - Assess bowel function  - Encourage oral fluids to ensure adequate hydration  - Administer IV fluids if ordered to ensure adequate hydration  - Administer ordered medications as needed  - Encourage mobilization and activity  - Consider nutritional services referral to assist patient with adequate nutrition and appropriate food choices  Outcome: Progressing  Goal: Maintains adequate nutritional intake  Description: INTERVENTIONS:  - Monitor percentage of each meal consumed  - Identify factors contributing to decreased intake, treat as appropriate  - Assist with meals as needed  - Monitor I&O, weight, and lab values if indicated  - Obtain nutrition services referral as needed  Outcome: Progressing  Goal: Establish and maintain optimal ostomy function  Description: INTERVENTIONS:  - Assess bowel function  - Encourage oral fluids to ensure adequate hydration  - Administer IV fluids if ordered to ensure adequate hydration   - Administer ordered medications as needed  - Encourage mobilization and activity  - Nutrition services referral to assist patient with appropriate food choices  - Assess stoma site  - Consider wound care consult   Outcome: Progressing  Goal: Oral mucous membranes remain intact  Description: INTERVENTIONS  - Assess oral mucosa and hygiene practices  - Implement preventative oral hygiene regimen  - Implement oral medicated treatments as ordered  - Initiate Nutrition services referral as needed  Outcome: Progressing     Problem: GENITOURINARY - ADULT  Goal: Maintains or returns to baseline urinary function  Description: INTERVENTIONS:  - Assess urinary function  - Encourage oral fluids to ensure adequate hydration if ordered  - Administer IV fluids as ordered to ensure adequate hydration  -  Administer ordered medications as needed  - Offer frequent toileting  - Follow urinary retention protocol if ordered  Outcome: Progressing  Goal: Absence of urinary retention  Description: INTERVENTIONS:  - Assess patient’s ability to void and empty bladder  - Monitor I/O  - Bladder scan as needed  - Discuss with physician/AP medications to alleviate retention as needed  - Discuss catheterization for long term situations as appropriate  Outcome: Progressing  Goal: Urinary catheter remains patent  Description: INTERVENTIONS:  - Assess patency of urinary catheter  - If patient has a chronic peace, consider changing catheter if non-functioning  - Follow guidelines for intermittent irrigation of non-functioning urinary catheter  Outcome: Progressing     Problem: METABOLIC, FLUID AND ELECTROLYTES - ADULT  Goal: Electrolytes maintained within normal limits  Description: INTERVENTIONS:  - Monitor labs and assess patient for signs and symptoms of electrolyte imbalances  - Administer electrolyte replacement as ordered  - Monitor response to electrolyte replacements, including repeat lab results as appropriate  - Instruct patient on fluid and nutrition as appropriate  Outcome: Progressing  Goal: Fluid balance maintained  Description: INTERVENTIONS:  - Monitor labs   - Monitor I/O and WT  - Instruct patient on fluid and nutrition as appropriate  - Assess for signs & symptoms of volume excess or deficit  Outcome: Progressing  Goal: Glucose maintained within target range  Description: INTERVENTIONS:  - Monitor Blood Glucose as ordered  - Assess for signs and symptoms of hyperglycemia and hypoglycemia  - Administer ordered medications to maintain glucose within target range  - Assess nutritional intake and initiate nutrition service referral as needed  Outcome: Progressing     Problem: HEMATOLOGIC - ADULT  Goal: Maintains hematologic stability  Description: INTERVENTIONS  - Assess for signs and symptoms of bleeding or  hemorrhage  - Monitor labs  - Administer supportive blood products/factors as ordered and appropriate  Outcome: Progressing     Problem: MUSCULOSKELETAL - ADULT  Goal: Maintain or return mobility to safest level of function  Description: INTERVENTIONS:  - Assess patient's ability to carry out ADLs; assess patient's baseline for ADL function and identify physical deficits which impact ability to perform ADLs (bathing, care of mouth/teeth, toileting, grooming, dressing, etc.)  - Assess/evaluate cause of self-care deficits   - Assess range of motion  - Assess patient's mobility  - Assess patient's need for assistive devices and provide as appropriate  - Encourage maximum independence but intervene and supervise when necessary  - Involve family in performance of ADLs  - Assess for home care needs following discharge   - Consider OT consult to assist with ADL evaluation and planning for discharge  - Provide patient education as appropriate  Outcome: Progressing  Goal: Maintain proper alignment of affected body part  Description: INTERVENTIONS:  - Support, maintain and protect limb and body alignment  - Provide patient/ family with appropriate education  Outcome: Progressing     Problem: Nutrition/Hydration-ADULT  Goal: Nutrient/Hydration intake appropriate for improving, restoring or maintaining nutritional needs  Description: Monitor and assess patient's nutrition/hydration status for malnutrition. Collaborate with interdisciplinary team and initiate plan and interventions as ordered.  Monitor patient's weight and dietary intake as ordered or per policy. Utilize nutrition screening tool and intervene as necessary. Determine patient's food preferences and provide high-protein, high-caloric foods as appropriate.     INTERVENTIONS:  - Monitor oral intake, urinary output, labs, and treatment plans  - Assess nutrition and hydration status and recommend course of action  - Evaluate amount of meals eaten  - Assist patient with  eating if necessary   - Allow adequate time for meals  - Recommend/ encourage appropriate diets, oral nutritional supplements, and vitamin/mineral supplements  - Order, calculate, and assess calorie counts as needed  - Recommend, monitor, and adjust tube feedings and TPN/PPN based on assessed needs  - Assess need for intravenous fluids  - Provide specific nutrition/hydration education as appropriate  - Include patient/family/caregiver in decisions related to nutrition  Outcome: Progressing     Problem: Prexisting or High Potential for Compromised Skin Integrity  Goal: Skin integrity is maintained or improved  Description: INTERVENTIONS:  - Identify patients at risk for skin breakdown  - Assess and monitor skin integrity  - Assess and monitor nutrition and hydration status  - Monitor labs   - Assess for incontinence   - Turn and reposition patient  - Assist with mobility/ambulation  - Relieve pressure over bony prominences  - Avoid friction and shearing  - Provide appropriate hygiene as needed including keeping skin clean and dry  - Evaluate need for skin moisturizer/barrier cream  - Collaborate with interdisciplinary team   - Patient/family teaching  - Consider wound care consult   Outcome: Progressing     Problem: SAFETY,RESTRAINT: NV/NON-SELF DESTRUCTIVE BEHAVIOR  Goal: Remains free of harm/injury (restraint for non violent/non self-detsructive behavior)  Description: INTERVENTIONS:  - Instruct patient/family regarding restraint use   - Assess and monitor physiologic and psychological status   - Provide interventions and comfort measures to meet assessed patient needs   - Identify and implement measures to help patient regain control  - Assess readiness for release of restraint   Outcome: Progressing  Goal: Returns to optimal restraint-free functioning  Description: INTERVENTIONS:  - Assess the patient's behavior and symptoms that indicate continued need for restraint  - Identify and implement measures to help  patient regain control  - Assess readiness for release of restraint   Outcome: Progressing

## 2024-11-20 NOTE — PROGRESS NOTES
Progress Note - Critical Care/ICU   Name: Adelina Soto 64 y.o. female I MRN: [id number]  [facility] I Date of Admission: 11/12/2024   Date of Service: 11/20/2024 I Hospital Day: 8       Summary:  Patient is a 64 year old female with history of Afib on Warfarin, HTN, T2DM, morbid obesity, HLD, hypothyroidism, initially presented to [facility] on 11/12 after being found by her partner at home with AMS, seizure-like activity. EMS witnessed tonic-clonic seizure-like activity, treated with 6 mg of Versed. 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 Was subsequently intubated for airway protection and transferred to [facility]. Initial LP generally unremarkable apart from RBC's 300's from otherwise atraumatic tap, negative ME panel and HSV. 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     [facility], during evaluation pressors were weaned and AEDs adjusted. Underwent CT head which appeared unchanged, LP on 11/19 with fluoro unsuccessful. Off video EEG 11/20 and pending MRI.    Assessment & Plan     Neuro:   Diagnosis: Status epilepticus  Initially presented with AMS, witnessed seizures  Initiral MRI c/f possible HSV encephalitis, started on acyclovir  Neurology following, vEEG initially showing left frontal seizures, burst suppressed initially, now on Keppra 1g q12h, Phenytoin 100 mg q8h, Depacon 375 mg q6h  LP showed glucose 89, protein 74, 339 RBC's, 2 WBC's, ME panel negative  ID following, recommend continuing acyclovir 10 mg/kg every 12 hours for time being  Off vEEG 11/20, pending repeat MRI brain with contrast    Diagnosis: H/o depression  Plan:  Continue home Zoloft     CV:   Diagnosis: Shock/hypotension, likely in setting of  sedation  Plan:  MAP 65-70, pff off pressors     Diagnosis: Elevated troponin  54 on arrival, peaked at 1887 on 11/13  ECG without ST changes  Likely demand related due to RVR and/or hypotension as above  Plan:  Monitor telemetry, rate control < 110 bpm     Diagnosis: H/o Afib on Warfarin  Plan:  Lopressor 12.5 mg PO BID  AC with heparin gtt, transition back to Warfarin when appropriate  Will fluid challenge with 500 cc's crystalloid, if no improvement in HR can go up on Lopressor as pressure allows     Pulm:  Diagnosis: Acute hypoxic respiratory failure  Intubated on arrival for airway protection due to AMS  Vent settings: SCMV, 18/420/8/40%  Bronch 11/12 with generalized edematous friable mucosa and moderate thick purulent secretions, culture with mixed respiratory viktoriya  Initial chest x-ray on arrival with mild edema more so on the right, left-sided whiteout likely from mucous plugging and atelectasis, given improvement after bronchoscopy, now continues to have crackles bilaterally  GI:   Diagnosis: GERD  Plan:  Continue home pepcid     :   Diagnosis: CKD  Baseline Cr 1.4-1.8  Remains baseline throughout hospital stay  Monitor renal fx, monitor I/O's, avoid nephrotoxins     F/E/N:   F - none  E - K > 4, Mg > 2, Phos > 3  N - TF with Vital 1.2 @ 55/hr     Heme/Onc:   Diagnosis: Anemia  Hgb 9-10 this admission  Stable thus far, transfuse if < 7     Endo:   Diagnosis: Hypothyroidism  TSH 0.29, T4 1.23  Continue home levothyroxine     Diagnosis: T2DM  Diet controlled, not on medications outpatient  Monitor BG for goal 140-180  Add SSI if needed     ID:   Diagnosis: Possible encephalitis  Initial MRI brain w/ c/f HSV encephalitis  LP and ME panel neg  ID following, continue Acyclovir 10 mg/kg q12h, repeat LP today and if HSV negative again d/c acyclovir     Diagnosis: UTI, possible pneumonia  Urine culture growing E. coli and ESBL proteus  Bronchoscopy with copious purulent secretions, however culture with mixed  respiratory viktoriya  Afebrile, WBC downtrending     MSK/Skin:   Diagnosis: Pressure wounds  Continue wound care     Disposition: Critical care    ICU Core Measures     Vented Patient  VAP Bundle  VAP bundle ordered     A: Assess, Prevent, and Manage Pain Has pain been assessed? Yes  Need for changes to pain regimen? No   B: Both Spontaneous Awakening Trials (SATs) and Spontaneous Breathing Trials (SBTs) Plan to perform spontaneous awakening trial today? Yes   Plan to perform spontaneous breathing trial today? Yes   Obvious barriers to extubation? Yes   C: Choice of Sedation RASS Goal: 0 Alert and Calm  Need for changes to sedation or analgesia regimen? No   D: Delirium CAM-ICU: Unable to perform secondary to Acute cognitive dysfunction   E: Early Mobility  Plan for early mobility? NA   F: Family Engagement Plan for family engagement today? Yes       Antibiotic Review: Patient on appropriate coverage based on culture data.     Review of Invasive Devices:    [place]omara Plan: Continue for accurate I/O monitoring for 48 hours  Central access plan: Medications requiring central line      Prophylaxis:  VTE VTE covered by:  heparin (porcine), Intravenous, 15.04 Units/kg/hr at 11/20/24 0634  heparin (porcine), Intravenous  heparin (porcine), Intravenous       Stress Ulcer  covered byfamotidine (PEPCID) 20 mg tablet [712413163] (Long-Term Med), famotidine (PEPCID) tablet 20 mg [[id number]], pantoprazole (PROTONIX) 40 mg tablet [[id number]] (Long-Term Med)         24 Hour Events : AISSATOU overnight, INR improved to 1.55 this morning. Tentatively pending repeat LP. Unfortunately still no signs of neurological improvement, remains on Vasopressin as well as vEEG.    Subjective   Review of Systems: Review of Systems not obtainable due to Clinical Condition    Objective :                   Vitals I/O      Most Recent Min/Max in 24hrs   Temp 98.4 °F (36.9 °C) Temp  Min: 98.2 °F (36.8 °C)  Max: 99.1 °F (37.3 °C)   Pulse (!) 110 Pulse  Min: 94   Max: 116   Resp 19 Resp  Min: 16  Max: 43   /57 BP  Min: 75/46  Max: 141/83   O2 Sat 96 % SpO2  Min: 90 %  Max: 99 %      Intake/Output Summary (Last 24 hours) at 11/20/2024 0645  Last data filed at 11/20/2024 0528  Gross per 24 hour   Intake 2678.38 ml   Output 6150 ml   Net -3471.62 ml       Diet Enteral/Parenteral; Tube Feeding No Oral Diet; Vital AF 1.2; Cyclic; 55; 22 hours    Invasive Monitoring           Physical Exam   Physical Exam  Eyes:      Comments: Pupils bilaterally reactive, sluggish   Skin:     General: Skin is warm and dry.   HENT:      Head: Normocephalic and atraumatic.      Mouth/Throat:      Mouth: No angioedema.   Cardiovascular:      Rate and Rhythm: Tachycardia present. Rhythm irregular.   Musculoskeletal:      Right lower leg: Trace Edema present.      Left lower leg: Trace Edema present.      Comments: RUE edema noted.   Abdominal: General: There is no distension.      Palpations: Abdomen is soft.      Tenderness: There is no abdominal tenderness.   Constitutional:       General: She is not in acute distress.     Interventions: She is intubated. She is not sedated.     Comments: Patient is unresponsive   Pulmonary:      Effort: She is intubated.      Breath sounds: Normal breath sounds.   Neurological:      Mental Status: She is unresponsive.        Cough reflex and gag reflex intact.      Comments: Minimal spontaneous movement of RLE.          Diagnostic Studies        Lab Results: I have reviewed the following results:     Medications:  Scheduled PRN   acyclovir, 10 mg/kg (Adjusted), Q12H  atorvastatin, 10 mg, Daily With Dinner  chlorhexidine, 15 mL, Q12H LINO  famotidine, 20 mg, Daily  insulin lispro, 2-12 Units, Q6H LINO  levalbuterol, 1.25 mg, TID  levETIRAcetam, 750 mg, Q12H LINO  levothyroxine, 100 mcg, Early Morning  metoprolol tartrate, 12.5 mg, Q12H LINO  EARLINE ANTIFUNGAL, , BID  sertraline, 50 mg, Daily  thiamine, 500 mg, TID  valproate sodium, 500 mg, Q6H LINO       acetaminophen, 1,000 mg, Q6H PRN  albuterol, 2.5 mg, Q6H PRN  fentaNYL, 50 mcg, Q1H PRN  heparin (porcine), 10,000 Units, Q6H PRN  heparin (porcine), 5,000 Units, Q6H PRN  LORazepam, 2 mg, Q4H PRN  ondansetron, 4 mg, Q4H PRN       Continuous    heparin (porcine), 3-30 Units/kg/hr (Order-Specific), Last Rate: 15.04 Units/kg/hr (11/20/24 0634)  vasopressin, 0.04 Units/min, Last Rate: Stopped (11/20/24 0231)         Labs:   CBC    Recent Labs     11/19/24 1714 11/20/24  0507   WBC 7.27 8.35   HGB 9.9* 9.6*   HCT 33.6* 33.1*    159     BMP    Recent Labs     11/19/24 1716 11/20/24  0507   SODIUM 138 138   K 3.5 3.6    100   CO2 31 31   AGAP 7 7   BUN 21 23   CREATININE 1.63* 1.58*   CALCIUM 8.3* 8.4       Coags    Recent Labs     11/19/24  1051 11/19/24 1714 11/19/24  2321   INR 1.45* 1.39*  --    PTT  --  37* >210*        Additional Electrolytes  Recent Labs     11/19/24  0424 11/19/24 1716 11/19/24  1953 11/20/24  0507   MG 1.9 2.2  --  1.9   PHOS 2.5  --   --  1.8*   CAIONIZED  --   --  1.15  --           Blood Gas    Recent Labs     11/20/24  0507   PHART 7.375   HOT1RPX 54.6*   PO2ART 86.5   JIN6SIX 31.2*   BEART 4.7   SOURCE Line, Arterial     Recent Labs     11/20/24  0507   SOURCE Line, Arterial    LFTs  No recent results    Infectious  No recent results  Glucose  Recent Labs     11/19/24  0424 11/19/24 1716 11/20/24  0507   GLUC 145* 145* 121        Jose Luis Duff MD  [facility]  Internal Medicine Residency PGY-2

## 2024-11-20 NOTE — PROGRESS NOTES
Progress Note - Neurology   Name: Adelina Soto 64 y.o. female I MRN: [id number]  Unit/Bed#: [facility] 01 I Date of Admission: 11/12/2024   Date of Service: 11/20/2024 I Hospital Day: 8    Assessment & Plan  Seizure (HCC)  Adelina Soto is a 64 year old woman with PMHx including HTN, HLD, hypothyroidism, A fib on Warfarin, DM, GERD, anxiety who presented to the hospital for evaluation of altered mental status. Stroke alert called due to gaze deviation and altered mentation. Pt was noted to have two witnessed seizures prior to arrival, and received 6 mg Versed prior to arrival. Continued to have left gaze preference for ED, with lack of command following although was moving her LUE spontaneously. CT head and CTA H/N with no findings that could explain her presentation. After return from CT scan, appeared to have some improvement in her exam but still off from her baseline on discussion with sister at bedside. Concern for status epilepticus with multiple seizures without return to baseline as of yet. Otherwise compliant with Coumadin, with INR 2.42.     VEEG monitoring report of 08:00 11/13: 2:20 this morning and around 02:40 started to have repetitive left frontal seizures, every 10 seconds at times. She was started on sedation again shortly after 03:00 and seizures stopped at that point. EEG is now very low amplitude and slow, with frequent left temporal sharps.     VEEG 24 hrs AM 11:17: EEG is still discontinuous and mostly polymorphic delta activities. Still with some left frontal and left temporal sharps, but no seizures.    MRI: Limited by motion. Mild restricted diffusion with abnormal FLAIR signal in the mesial left temporal lobe could be postictal. Recommend correlation with CSF to exclude infection such as HSV. Abnormal signal in the right thalamus of uncertain etiology.     Impression: status epilepticus, which required multiple AED & sedation medications to control in setting of no history of seizures &  currently unknown etiology    Plan:   - Repeat LP under fluoro unsuccessful so tx w/ acyc empirically  - VPA 500mg q6h remain  - keppra 750mg q12h remain  - DC vEEG as no further need or ASM adjustments  - Seizure precautions  - Therapy if able  - Systolic BP goal < 180  - Rest of care per primary    Adelina ANABELL Soto will need follow up in in 6 weeks with general attending or advance practitioner following discharge from hospital. She will not require outpatient neurological testing. Msg sent.  I have discussed with Dr. Coyne the above plan to treat pt. She agrees with the plan.  Please contact the SecureChat role for the Neurology service with any questions/concerns.    Subjective   Pt remains unresponsive & intubated.    Review of Systems   Unable to perform ROS: Intubated         Objective :  Temp:  [98.2 °F (36.8 °C)-99.1 °F (37.3 °C)] 98.4 °F (36.9 °C)  HR:  [] 119  BP: ()/(46-70) 119/60  Resp:  [16-43] 19  SpO2:  [90 %-99 %] 93 %  O2 Device: Ventilator  FiO2 (%):  [40] 40    Physical Exam  Vitals and nursing note reviewed.   Constitutional:       General: She is not in acute distress.     Appearance: She is not ill-appearing, toxic-appearing or diaphoretic.   HENT:      Head: Normocephalic.      Right Ear: External ear normal.      Left Ear: External ear normal.      Nose: Nose normal.   Eyes:      General: No scleral icterus.        Right eye: No discharge.         Left eye: No discharge.      Conjunctiva/sclera: Conjunctivae normal.     Neurological Exam  Mental Status   Patient is nonverbal.  Unresponsive not on sedation.    Cranial Nerves  Intubated.    Motor  Normal muscle bulk throughout. No fasciculations present. Normal muscle tone. No abnormal involuntary movements.  No movement to pain .    Sensory  Intubated.    Reflexes  Deep tendon reflexes are 2+ and symmetric except as noted.  Symmetric.    Coordination    Intubated.    Gait    Intubated.        Lab Results: I have reviewed the  following results:  Other Study Results Review: Other studies reviewed include: vEEG & VPA level total.    VTE Pharmacologic Prophylaxis: VTE covered by:  heparin (porcine), Intravenous, 12 Units/kg/hr at 11/20/24 0849  heparin (porcine), Intravenous  heparin (porcine), Intravenous        Administrative Statements   I have spent a total time of 30 minutes in caring for this patient on the day of the visit/encounter including Counseling / Coordination of care, Documenting in the medical record, Reviewing / ordering tests, medicine, procedures  , Obtaining or reviewing history  , and Communicating with other healthcare professionals .

## 2024-11-20 NOTE — PROGRESS NOTES
Progress Note - Infectious Disease   Name: Adelina Soto 64 y.o. female I MRN: [id number]  Unit/Bed#: [facility] I Date of Admission: 11/12/2024   Date of Service: 11/20/2024 I Hospital Day: 8    Assessment & Plan  Seizure (HCC)   Patient presented with acute encephalopathy and seizure-like activity.  Video EEG concerning for status epilepticus.  MRI brain without contrast was limited by motion but showed restricted diffusion in the left temporal lobe which could be postictal versus infection.  Status post LP 11/12 and CSF studies unremarkable, not consistent with infection with 2 WBCs.  Protein mildly elevated which could be due to seizure activity itself.  ME panel negative, stand alone HSV PCR also negative.  However with new onset seizures and possible abnormality of the temporal lobe HSV remains a possibility as PCR may be negative and CSF bland and early disease.  CSF culture with no growth.  The patient has grown E. coli and ESBL Proteus in urine culture but in the absence of prior systemic signs of infection or localizing symptoms UTI, doubt this is causing status epilepticus. HIV negative. Fever and leukocytosis improving.  Remains poorly responsive off sedation but having some reflexes today.  IR unable to perform patient's LP due to technical difficulty.              -continue IV acyclovir 10 mg/kg every 12 hours.  Will complete an empiric 14-day course since unable to perform LP              -MRI brain with contrast pending   -AEDs per neurology  Bacteriuria  UA with pyuria and urine culture growing E. coli and ESBL Proteus.  No symptoms of infection prior to admission.  Unlikely cause of status epilepticus however in absence of clear cause of seizure activity treated for possible cystitis with 3 days zosyn   -monitor off antibiotics   Shock (HCC)  May be multifactorial related to possible infection, sedating medications.  Patient remains on vasopressor support but weaning   -Hemodynamic support per  critical care team   -Antimicrobials as above  Acute hypoxic respiratory failure (HCC)  Patient intubated for airway protection.  Status post bronchoscopy 11/13 for mucous plugging and thick left-sided secretions seen.  Chest x-ray not consistent with pneumonia.  BAL culture shows growth of mixed respiratory viktoriya.   -Ventilator management per critical care team  Morbid obesity with BMI of 50.0-59.9, adult ([facility])  Affects antimicrobial dosing  CKD (chronic kidney disease)  Lab Results   Component Value Date    EGFR 34 11/20/2024    EGFR 34 11/20/2024    EGFR 32 11/19/2024    CREATININE 1.57 (H) 11/20/2024    CREATININE 1.58 (H) 11/20/2024    CREATININE 1.63 (H) 11/19/2024   Baseline creatinine 1.7-1.9.  Creatinine stable, slightly below baseline.  Dose adjust antibiotics as needed for creatinine clearance.  Monitor creatinine daily  Type 2 diabetes mellitus with hyperglycemia, unspecified whether long term insulin use ([facility])  Lab Results   Component Value Date    HGBA1C 5.7 (H) 11/13/2024   Diabetes well-controlled.  Glycemic management per primary team    Above management plan to continue IV acyclovir discussed with the critical care resident. ID will follow.    Antibiotics:  Acyclovir day 8    Subjective   The patient remains intubated and sedated.  Reportedly with gag reflex today.  IR had difficulty with her LP yesterday and unable to perform.  She has no fever or chills.    Temp:  [98.2 °F (36.8 °C)-99.5 °F (37.5 °C)] 99.3 °F (37.4 °C)  HR:  [] 118  BP: ()/(50-80) 155/80  Resp:  [16-21] 17  SpO2:  [90 %-99 %] 98 %  O2 Device: Ventilator    General: Intubated, not responsive   Head: EEG leads in place  Mouth: ET tube in place  Neck: trachea midline   CV: RRR, no murmurs   Lungs: clear to auscultation bilaterally   Abdomen: no distension   Skin: no rashes, cellulitis.  Intertrigo in her skin folds    Lab Results: I have reviewed the following results:  Results from last 7 days   Lab Units 11/20/24  7977  11/19/24  1714 11/19/24  0424   WBC Thousand/uL 8.35 7.27 6.46   HEMOGLOBIN g/dL 9.6* 9.9* 9.2*   PLATELETS Thousands/uL 159 164 139*     Results from last 7 days   Lab Units 11/20/24  1418 11/20/24  0507 11/19/24  1716 11/18/24  0447 11/17/24  0517 11/16/24  0503 11/15/24  0524   SODIUM mmol/L 141 138 138   < > 140 137 138   POTASSIUM mmol/L 4.3 3.6 3.5   < > 4.2 3.8 3.8   CHLORIDE mmol/L 102 100 100   < > 108 110* 108   CO2 mmol/L 33* 31 31   < > 24 20* 22   BUN mg/dL 26* 23 21   < > 16 15 14   CREATININE mg/dL 1.57* 1.58* 1.63*   < > 1.48* 1.47* 1.46*   EGFR ml/min/1.73sq m 34 34 32   < > 36 37 37   CALCIUM mg/dL 8.6 8.4 8.3*   < > 7.8* 7.3* 7.4*   AST U/L  --   --   --   --  15 17 16   ALT U/L  --   --   --   --  6* 6* 6*   ALK PHOS U/L  --   --   --   --  96 92 95   ALBUMIN g/dL  --   --   --   --  2.4* 2.3* 2.5*    < > = values in this interval not displayed.                   I have personally reviewed pertinent imaging reports and images in PACS. MRI brain without contrast shows left temporal lobe restricted diffusion.

## 2024-11-21 LAB
ALBUMIN SERPL BCG-MCNC: 2.5 G/DL (ref 3.5–5)
ALP SERPL-CCNC: 83 U/L (ref 34–104)
ALT SERPL W P-5'-P-CCNC: 6 U/L (ref 7–52)
ANION GAP SERPL CALCULATED.3IONS-SCNC: 5 MMOL/L (ref 4–13)
ANISOCYTOSIS BLD QL SMEAR: PRESENT
APTT PPP: 63 SECONDS (ref 23–34)
AST SERPL W P-5'-P-CCNC: 13 U/L (ref 13–39)
BASOPHILS # BLD MANUAL: 0 THOUSAND/UL (ref 0–0.1)
BASOPHILS NFR MAR MANUAL: 0 % (ref 0–1)
BILIRUB SERPL-MCNC: 0.25 MG/DL (ref 0.2–1)
BUN SERPL-MCNC: 25 MG/DL (ref 5–25)
CALCIUM ALBUM COR SERPL-MCNC: 9.9 MG/DL (ref 8.3–10.1)
CALCIUM SERPL-MCNC: 8.7 MG/DL (ref 8.4–10.2)
CHLORIDE SERPL-SCNC: 106 MMOL/L (ref 96–108)
CO2 SERPL-SCNC: 33 MMOL/L (ref 21–32)
CREAT SERPL-MCNC: 1.51 MG/DL (ref 0.6–1.3)
EOSINOPHIL # BLD MANUAL: 0.1 THOUSAND/UL (ref 0–0.4)
EOSINOPHIL NFR BLD MANUAL: 1 % (ref 0–6)
ERYTHROCYTE [DISTWIDTH] IN BLOOD BY AUTOMATED COUNT: 16.9 % (ref 11.6–15.1)
GFR SERPL CREATININE-BSD FRML MDRD: 36 ML/MIN/1.73SQ M
GLUCOSE SERPL-MCNC: 117 MG/DL (ref 65–140)
GLUCOSE SERPL-MCNC: 118 MG/DL (ref 65–140)
GLUCOSE SERPL-MCNC: 119 MG/DL (ref 65–140)
GLUCOSE SERPL-MCNC: 119 MG/DL (ref 65–140)
GLUCOSE SERPL-MCNC: 121 MG/DL (ref 65–140)
GLUCOSE SERPL-MCNC: 133 MG/DL (ref 65–140)
HCT VFR BLD AUTO: 34.4 % (ref 34.8–46.1)
HGB BLD-MCNC: 9.9 G/DL (ref 11.5–15.4)
INR PPP: 1.18 (ref 0.85–1.19)
LYMPHOCYTES # BLD AUTO: 1.17 THOUSAND/UL (ref 0.6–4.47)
LYMPHOCYTES # BLD AUTO: 11 % (ref 14–44)
MACROCYTES BLD QL AUTO: PRESENT
MAGNESIUM SERPL-MCNC: 2.1 MG/DL (ref 1.9–2.7)
MCH RBC QN AUTO: 31 PG (ref 26.8–34.3)
MCHC RBC AUTO-ENTMCNC: 28.8 G/DL (ref 31.4–37.4)
MCV RBC AUTO: 108 FL (ref 82–98)
METAMYELOCYTE ABSOLUTE CT: 0.1 THOUSAND/UL (ref 0–0.1)
METAMYELOCYTES NFR BLD MANUAL: 1 % (ref 0–1)
MONOCYTES # BLD AUTO: 1.07 THOUSAND/UL (ref 0–1.22)
MONOCYTES NFR BLD: 11 % (ref 4–12)
MYELOCYTE ABSOLUTE CT: 0.19 THOUSAND/UL (ref 0–0.1)
MYELOCYTES NFR BLD MANUAL: 2 % (ref 0–1)
NEUTROPHILS # BLD MANUAL: 7.1 THOUSAND/UL (ref 1.85–7.62)
NEUTS SEG NFR BLD AUTO: 73 % (ref 43–75)
NRBC BLD AUTO-RTO: 2 /100 WBC (ref 0–2)
PHOSPHATE SERPL-MCNC: 2.6 MG/DL (ref 2.3–4.1)
PLATELET # BLD AUTO: 189 THOUSANDS/UL (ref 149–390)
PLATELET BLD QL SMEAR: ADEQUATE
PMV BLD AUTO: 11.1 FL (ref 8.9–12.7)
POIKILOCYTOSIS BLD QL SMEAR: PRESENT
POTASSIUM SERPL-SCNC: 4.1 MMOL/L (ref 3.5–5.3)
PROT SERPL-MCNC: 6 G/DL (ref 6.4–8.4)
PROTHROMBIN TIME: 15.3 SECONDS (ref 12.3–15)
RBC # BLD AUTO: 3.19 MILLION/UL (ref 3.81–5.12)
RBC MORPH BLD: PRESENT
SODIUM SERPL-SCNC: 144 MMOL/L (ref 135–147)
VARIANT LYMPHS # BLD AUTO: 1 %
WBC # BLD AUTO: 9.73 THOUSAND/UL (ref 4.31–10.16)

## 2024-11-21 PROCEDURE — 94640 AIRWAY INHALATION TREATMENT: CPT

## 2024-11-21 PROCEDURE — 82948 REAGENT STRIP/BLOOD GLUCOSE: CPT

## 2024-11-21 PROCEDURE — 99291 CRITICAL CARE FIRST HOUR: CPT | Performed by: STUDENT IN AN ORGANIZED HEALTH CARE EDUCATION/TRAINING PROGRAM

## 2024-11-21 PROCEDURE — 85610 PROTHROMBIN TIME: CPT

## 2024-11-21 PROCEDURE — 85027 COMPLETE CBC AUTOMATED: CPT

## 2024-11-21 PROCEDURE — 85007 BL SMEAR W/DIFF WBC COUNT: CPT

## 2024-11-21 PROCEDURE — 99233 SBSQ HOSP IP/OBS HIGH 50: CPT | Performed by: STUDENT IN AN ORGANIZED HEALTH CARE EDUCATION/TRAINING PROGRAM

## 2024-11-21 PROCEDURE — 83735 ASSAY OF MAGNESIUM: CPT

## 2024-11-21 PROCEDURE — 85730 THROMBOPLASTIN TIME PARTIAL: CPT | Performed by: STUDENT IN AN ORGANIZED HEALTH CARE EDUCATION/TRAINING PROGRAM

## 2024-11-21 PROCEDURE — 84100 ASSAY OF PHOSPHORUS: CPT

## 2024-11-21 PROCEDURE — 94760 N-INVAS EAR/PLS OXIMETRY 1: CPT

## 2024-11-21 PROCEDURE — 94003 VENT MGMT INPAT SUBQ DAY: CPT

## 2024-11-21 PROCEDURE — 94669 MECHANICAL CHEST WALL OSCILL: CPT

## 2024-11-21 PROCEDURE — 80053 COMPREHEN METABOLIC PANEL: CPT

## 2024-11-21 PROCEDURE — 95718 EEG PHYS/QHP 2-12 HR W/VEEG: CPT | Performed by: PSYCHIATRY & NEUROLOGY

## 2024-11-21 PROCEDURE — 94664 DEMO&/EVAL PT USE INHALER: CPT

## 2024-11-21 RX ORDER — SODIUM CHLORIDE, SODIUM GLUCONATE, SODIUM ACETATE, POTASSIUM CHLORIDE, MAGNESIUM CHLORIDE, SODIUM PHOSPHATE, DIBASIC, AND POTASSIUM PHOSPHATE .53; .5; .37; .037; .03; .012; .00082 G/100ML; G/100ML; G/100ML; G/100ML; G/100ML; G/100ML; G/100ML
500 INJECTION, SOLUTION INTRAVENOUS ONCE
Status: COMPLETED | OUTPATIENT
Start: 2024-11-21 | End: 2024-11-21

## 2024-11-21 RX ADMIN — SERTRALINE HYDROCHLORIDE 50 MG: 50 TABLET ORAL at 09:11

## 2024-11-21 RX ADMIN — LEVOTHYROXINE SODIUM 100 MCG: 100 TABLET ORAL at 06:35

## 2024-11-21 RX ADMIN — ATORVASTATIN CALCIUM 10 MG: 10 TABLET, FILM COATED ORAL at 16:43

## 2024-11-21 RX ADMIN — VALPROATE SODIUM 500 MG: 100 INJECTION, SOLUTION INTRAVENOUS at 16:43

## 2024-11-21 RX ADMIN — CHLORHEXIDINE GLUCONATE 0.12% ORAL RINSE 15 ML: 1.2 LIQUID ORAL at 20:44

## 2024-11-21 RX ADMIN — VALPROATE SODIUM 500 MG: 100 INJECTION, SOLUTION INTRAVENOUS at 22:17

## 2024-11-21 RX ADMIN — CHLORHEXIDINE GLUCONATE 0.12% ORAL RINSE 15 ML: 1.2 LIQUID ORAL at 09:13

## 2024-11-21 RX ADMIN — ACYCLOVIR SODIUM 775 MG: 50 INJECTION, SOLUTION INTRAVENOUS at 20:44

## 2024-11-21 RX ADMIN — VALPROATE SODIUM 500 MG: 100 INJECTION, SOLUTION INTRAVENOUS at 09:25

## 2024-11-21 RX ADMIN — METOPROLOL TARTRATE 25 MG: 25 TABLET, FILM COATED ORAL at 20:44

## 2024-11-21 RX ADMIN — FAMOTIDINE 20 MG: 20 TABLET, FILM COATED ORAL at 09:10

## 2024-11-21 RX ADMIN — SODIUM CHLORIDE, SODIUM GLUCONATE, SODIUM ACETATE, POTASSIUM CHLORIDE, MAGNESIUM CHLORIDE, SODIUM PHOSPHATE, DIBASIC, AND POTASSIUM PHOSPHATE 500 ML: .53; .5; .37; .037; .03; .012; .00082 INJECTION, SOLUTION INTRAVENOUS at 13:02

## 2024-11-21 RX ADMIN — MICONAZOLE NITRATE 1 APPLICATION: 20 CREAM TOPICAL at 09:11

## 2024-11-21 RX ADMIN — METOPROLOL TARTRATE 25 MG: 25 TABLET, FILM COATED ORAL at 09:10

## 2024-11-21 RX ADMIN — LEVALBUTEROL HYDROCHLORIDE 1.25 MG: 1.25 SOLUTION RESPIRATORY (INHALATION) at 07:03

## 2024-11-21 RX ADMIN — LEVALBUTEROL HYDROCHLORIDE 1.25 MG: 1.25 SOLUTION RESPIRATORY (INHALATION) at 14:25

## 2024-11-21 RX ADMIN — LEVETIRACETAM 750 MG: 100 INJECTION, SOLUTION INTRAVENOUS at 09:14

## 2024-11-21 RX ADMIN — VALPROATE SODIUM 500 MG: 100 INJECTION, SOLUTION INTRAVENOUS at 05:11

## 2024-11-21 RX ADMIN — LEVALBUTEROL HYDROCHLORIDE 1.25 MG: 1.25 SOLUTION RESPIRATORY (INHALATION) at 19:25

## 2024-11-21 RX ADMIN — LEVETIRACETAM 750 MG: 100 INJECTION, SOLUTION INTRAVENOUS at 20:44

## 2024-11-21 RX ADMIN — ACYCLOVIR SODIUM 775 MG: 50 INJECTION, SOLUTION INTRAVENOUS at 09:13

## 2024-11-21 RX ADMIN — MICONAZOLE NITRATE: 20 CREAM TOPICAL at 17:36

## 2024-11-21 RX ADMIN — HEPARIN SODIUM 10 UNITS/KG/HR: 10000 INJECTION, SOLUTION INTRAVENOUS at 02:47

## 2024-11-21 NOTE — PLAN OF CARE
Problem: PAIN - ADULT  Goal: Verbalizes/displays adequate comfort level or baseline comfort level  Description: Interventions:  - Encourage patient to monitor pain and request assistance  - Assess pain using appropriate pain scale  - Administer analgesics based on type and severity of pain and evaluate response  - Implement non-pharmacological measures as appropriate and evaluate response  - Consider cultural and social influences on pain and pain management  - Notify physician/advanced practitioner if interventions unsuccessful or patient reports new pain  Outcome: Progressing     Problem: INFECTION - ADULT  Goal: Absence or prevention of progression during hospitalization  Description: INTERVENTIONS:  - Assess and monitor for signs and symptoms of infection  - Monitor lab/diagnostic results  - Monitor all insertion sites, i.e. indwelling lines, tubes, and drains  - Monitor endotracheal if appropriate and nasal secretions for changes in amount and color  - [place] appropriate cooling/warming therapies per order  - Administer medications as ordered  - Instruct and encourage patient and family to use good hand hygiene technique  - Identify and instruct in appropriate isolation precautions for identified infection/condition  Outcome: Progressing  Goal: Absence of fever/infection during neutropenic period  Description: INTERVENTIONS:  - Monitor WBC    Outcome: Progressing     Problem: SAFETY ADULT  Goal: Patient will remain free of falls  Description: INTERVENTIONS:  - Educate patient/family on patient safety including physical limitations  - Instruct patient to call for assistance with activity   - Consult OT/PT to assist with strengthening/mobility   - Keep Call bell within reach  - Keep bed low and locked with side rails adjusted as appropriate  - Keep care items and personal belongings within reach  - Initiate and maintain comfort rounds  - Make Fall Risk Sign visible to staff  - Apply yellow socks and bracelet  for high fall risk patients  - Consider moving patient to room near nurses station  Outcome: Progressing  Goal: Maintain or return to baseline ADL function  Description: INTERVENTIONS:  -  Assess patient's ability to carry out ADLs; assess patient's baseline for ADL function and identify physical deficits which impact ability to perform ADLs (bathing, care of mouth/teeth, toileting, grooming, dressing, etc.)  - Assess/evaluate cause of self-care deficits   - Assess range of motion  - Assess patient's mobility; develop plan if impaired  - Assess patient's need for assistive devices and provide as appropriate  - Encourage maximum independence but intervene and supervise when necessary  - Involve family in performance of ADLs  - Assess for home care needs following discharge   - Consider OT consult to assist with ADL evaluation and planning for discharge  - Provide patient education as appropriate  Outcome: Progressing  Goal: Maintains/Returns to pre admission functional level  Description: INTERVENTIONS:  - Perform AM-PAC 6 Click Basic Mobility/ Daily Activity assessment daily.  - Set and communicate daily mobility goal to care team and patient/family/caregiver.   - Collaborate with rehabilitation services on mobility goals if consulted  - Record patient progress and toleration of activity level   Outcome: Progressing     Problem: DISCHARGE PLANNING  Goal: Discharge to home or other facility with appropriate resources  Description: INTERVENTIONS:  - Identify barriers to discharge w/patient and caregiver  - Arrange for needed discharge resources and transportation as appropriate  - Identify discharge learning needs (meds, wound care, etc.)  - Arrange for interpretive services to assist at discharge as needed  - Refer to Case Management Department for coordinating discharge planning if the patient needs post-hospital services based on physician/advanced practitioner order or complex needs related to functional status,  cognitive ability, or social support system  Outcome: Progressing     Problem: Knowledge Deficit  Goal: Patient/family/caregiver demonstrates understanding of disease process, treatment plan, medications, and discharge instructions  Description: Complete learning assessment and assess knowledge base.  Interventions:  - Provide teaching at level of understanding  - Provide teaching via preferred learning methods  Outcome: Progressing     Problem: NEUROSENSORY - ADULT  Goal: Achieves stable or improved neurological status  Description: INTERVENTIONS  - Monitor and report changes in neurological status  - Monitor vital signs such as temperature, blood pressure, glucose, and any other labs ordered   - Initiate measures to prevent increased intracranial pressure  - Monitor for seizure activity and implement precautions if appropriate      Outcome: Progressing  Goal: Remains free of injury related to seizures activity  Description: INTERVENTIONS  - Maintain airway, patient safety  and administer oxygen as ordered  - Monitor patient for seizure activity, document and report duration and description of seizure to physician/advanced practitioner  - If seizure occurs,  ensure patient safety during seizure  - Reorient patient post seizure  - Seizure pads on all 4 side rails  - Instruct patient/family to notify RN of any seizure activity including if an aura is experienced  - Instruct patient/family to call for assistance with activity based on nursing assessment  - Administer anti-seizure medications if ordered    Outcome: Progressing  Goal: Achieves maximal functionality and self care  Description: INTERVENTIONS  - Monitor swallowing and airway patency with patient fatigue and changes in neurological status  - Encourage and assist patient to increase activity and self care.   - Encourage visually impaired, hearing impaired and aphasic patients to use assistive/communication devices  Outcome: Progressing     Problem:  CARDIOVASCULAR - ADULT  Goal: Maintains optimal cardiac output and hemodynamic stability  Description: INTERVENTIONS:  - Monitor I/O, vital signs and rhythm  - Monitor for S/S and trends of decreased cardiac output  - Administer and titrate ordered vasoactive medications to optimize hemodynamic stability  - Assess quality of pulses, skin color and temperature  - Assess for signs of decreased coronary artery perfusion  - Instruct patient to report change in severity of symptoms  Outcome: Progressing  Goal: Absence of cardiac dysrhythmias or at baseline rhythm  Description: INTERVENTIONS:  - Continuous cardiac monitoring, vital signs, obtain 12 lead EKG if ordered  - Administer antiarrhythmic and heart rate control medications as ordered  - Monitor electrolytes and administer replacement therapy as ordered  Outcome: Progressing     Problem: RESPIRATORY - ADULT  Goal: Achieves optimal ventilation and oxygenation  Description: INTERVENTIONS:  - Assess for changes in respiratory status  - Assess for changes in mentation and behavior  - Position to facilitate oxygenation and minimize respiratory effort  - Oxygen administered by appropriate delivery if ordered  - Initiate smoking cessation education as indicated  - Encourage broncho-pulmonary hygiene including cough, deep breathe, Incentive Spirometry  - Assess the need for suctioning and aspirate as needed  - Assess and instruct to report SOB or any respiratory difficulty  - Respiratory Therapy support as indicated  Outcome: Progressing     Problem: GASTROINTESTINAL - ADULT  Goal: Minimal or absence of nausea and/or vomiting  Description: INTERVENTIONS:  - Administer IV fluids if ordered to ensure adequate hydration  - Maintain NPO status until nausea and vomiting are resolved  - Nasogastric tube if ordered  - Administer ordered antiemetic medications as needed  - Provide nonpharmacologic comfort measures as appropriate  - Advance diet as tolerated, if ordered  - Consider  nutrition services referral to assist patient with adequate nutrition and appropriate food choices  Outcome: Progressing  Goal: Maintains or returns to baseline bowel function  Description: INTERVENTIONS:  - Assess bowel function  - Encourage oral fluids to ensure adequate hydration  - Administer IV fluids if ordered to ensure adequate hydration  - Administer ordered medications as needed  - Encourage mobilization and activity  - Consider nutritional services referral to assist patient with adequate nutrition and appropriate food choices  Outcome: Progressing  Goal: Maintains adequate nutritional intake  Description: INTERVENTIONS:  - Monitor percentage of each meal consumed  - Identify factors contributing to decreased intake, treat as appropriate  - Assist with meals as needed  - Monitor I&O, weight, and lab values if indicated  - Obtain nutrition services referral as needed  Outcome: Progressing  Goal: Establish and maintain optimal ostomy function  Description: INTERVENTIONS:  - Assess bowel function  - Encourage oral fluids to ensure adequate hydration  - Administer IV fluids if ordered to ensure adequate hydration   - Administer ordered medications as needed  - Encourage mobilization and activity  - Nutrition services referral to assist patient with appropriate food choices  - Assess stoma site  - Consider wound care consult   Outcome: Progressing  Goal: Oral mucous membranes remain intact  Description: INTERVENTIONS  - Assess oral mucosa and hygiene practices  - Implement preventative oral hygiene regimen  - Implement oral medicated treatments as ordered  - Initiate Nutrition services referral as needed  Outcome: Progressing     Problem: GENITOURINARY - ADULT  Goal: Maintains or returns to baseline urinary function  Description: INTERVENTIONS:  - Assess urinary function  - Encourage oral fluids to ensure adequate hydration if ordered  - Administer IV fluids as ordered to ensure adequate hydration  -  Administer ordered medications as needed  - Offer frequent toileting  - Follow urinary retention protocol if ordered  Outcome: Progressing  Goal: Absence of urinary retention  Description: INTERVENTIONS:  - Assess patient’s ability to void and empty bladder  - Monitor I/O  - Bladder scan as needed  - Discuss with physician/AP medications to alleviate retention as needed  - Discuss catheterization for long term situations as appropriate  Outcome: Progressing  Goal: Urinary catheter remains patent  Description: INTERVENTIONS:  - Assess patency of urinary catheter  - If patient has a chronic peace, consider changing catheter if non-functioning  - Follow guidelines for intermittent irrigation of non-functioning urinary catheter  Outcome: Progressing     Problem: METABOLIC, FLUID AND ELECTROLYTES - ADULT  Goal: Electrolytes maintained within normal limits  Description: INTERVENTIONS:  - Monitor labs and assess patient for signs and symptoms of electrolyte imbalances  - Administer electrolyte replacement as ordered  - Monitor response to electrolyte replacements, including repeat lab results as appropriate  - Instruct patient on fluid and nutrition as appropriate  Outcome: Progressing  Goal: Fluid balance maintained  Description: INTERVENTIONS:  - Monitor labs   - Monitor I/O and WT  - Instruct patient on fluid and nutrition as appropriate  - Assess for signs & symptoms of volume excess or deficit  Outcome: Progressing  Goal: Glucose maintained within target range  Description: INTERVENTIONS:  - Monitor Blood Glucose as ordered  - Assess for signs and symptoms of hyperglycemia and hypoglycemia  - Administer ordered medications to maintain glucose within target range  - Assess nutritional intake and initiate nutrition service referral as needed  Outcome: Progressing     Problem: HEMATOLOGIC - ADULT  Goal: Maintains hematologic stability  Description: INTERVENTIONS  - Assess for signs and symptoms of bleeding or  hemorrhage  - Monitor labs  - Administer supportive blood products/factors as ordered and appropriate  Outcome: Progressing     Problem: MUSCULOSKELETAL - ADULT  Goal: Maintain or return mobility to safest level of function  Description: INTERVENTIONS:  - Assess patient's ability to carry out ADLs; assess patient's baseline for ADL function and identify physical deficits which impact ability to perform ADLs (bathing, care of mouth/teeth, toileting, grooming, dressing, etc.)  - Assess/evaluate cause of self-care deficits   - Assess range of motion  - Assess patient's mobility  - Assess patient's need for assistive devices and provide as appropriate  - Encourage maximum independence but intervene and supervise when necessary  - Involve family in performance of ADLs  - Assess for home care needs following discharge   - Consider OT consult to assist with ADL evaluation and planning for discharge  - Provide patient education as appropriate  Outcome: Progressing  Goal: Maintain proper alignment of affected body part  Description: INTERVENTIONS:  - Support, maintain and protect limb and body alignment  - Provide patient/ family with appropriate education  Outcome: Progressing     Problem: Nutrition/Hydration-ADULT  Goal: Nutrient/Hydration intake appropriate for improving, restoring or maintaining nutritional needs  Description: Monitor and assess patient's nutrition/hydration status for malnutrition. Collaborate with interdisciplinary team and initiate plan and interventions as ordered.  Monitor patient's weight and dietary intake as ordered or per policy. Utilize nutrition screening tool and intervene as necessary. Determine patient's food preferences and provide high-protein, high-caloric foods as appropriate.     INTERVENTIONS:  - Monitor oral intake, urinary output, labs, and treatment plans  - Assess nutrition and hydration status and recommend course of action  - Evaluate amount of meals eaten  - Assist patient with  eating if necessary   - Allow adequate time for meals  - Recommend/ encourage appropriate diets, oral nutritional supplements, and vitamin/mineral supplements  - Order, calculate, and assess calorie counts as needed  - Recommend, monitor, and adjust tube feedings and TPN/PPN based on assessed needs  - Assess need for intravenous fluids  - Provide specific nutrition/hydration education as appropriate  - Include patient/family/caregiver in decisions related to nutrition  Outcome: Progressing     Problem: Prexisting or High Potential for Compromised Skin Integrity  Goal: Skin integrity is maintained or improved  Description: INTERVENTIONS:  - Identify patients at risk for skin breakdown  - Assess and monitor skin integrity  - Assess and monitor nutrition and hydration status  - Monitor labs   - Assess for incontinence   - Turn and reposition patient  - Assist with mobility/ambulation  - Relieve pressure over bony prominences  - Avoid friction and shearing  - Provide appropriate hygiene as needed including keeping skin clean and dry  - Evaluate need for skin moisturizer/barrier cream  - Collaborate with interdisciplinary team   - Patient/family teaching  - Consider wound care consult   Outcome: Progressing     Problem: SAFETY,RESTRAINT: NV/NON-SELF DESTRUCTIVE BEHAVIOR  Goal: Remains free of harm/injury (restraint for non violent/non self-detsructive behavior)  Description: INTERVENTIONS:  - Instruct patient/family regarding restraint use   - Assess and monitor physiologic and psychological status   - Provide interventions and comfort measures to meet assessed patient needs   - Identify and implement measures to help patient regain control  - Assess readiness for release of restraint   Outcome: Progressing  Goal: Returns to optimal restraint-free functioning  Description: INTERVENTIONS:  - Assess the patient's behavior and symptoms that indicate continued need for restraint  - Identify and implement measures to help  patient regain control  - Assess readiness for release of restraint   Outcome: Progressing

## 2024-11-21 NOTE — PROGRESS NOTES
Nutrition Follow-Up/Recommendations:    Current EN regimen (Vital 1.2 AF @55mL/hr x22 hrs/day, hold for 1 hour before and 1 hour after levothyroxine administration) remains appropriate to meet estimated calorie/protein needs at this time.     Consider adding free water flushes as TF formula only providing 981mL water (~72% of low end of estimated needs).   Would recommend at least 30mL q4 hrs to help maintain tube patency.     Noted rectal tube removed this AM, continue to monitor GI function/stool output. Consider adding banana flakes if loose stools persist, recommended dose with TF is one packet TID.     Will continue to follow plan of care/goals of care.

## 2024-11-21 NOTE — ASSESSMENT & PLAN NOTE
Lab Results   Component Value Date    EGFR 36 11/21/2024    EGFR 34 11/20/2024    EGFR 34 11/20/2024    CREATININE 1.51 (H) 11/21/2024    CREATININE 1.57 (H) 11/20/2024    CREATININE 1.58 (H) 11/20/2024   Baseline creatinine 1.7-1.9.  Creatinine stable, slightly below baseline.  Dose adjust antibiotics as needed for creatinine clearance.  Monitor creatinine daily

## 2024-11-21 NOTE — PLAN OF CARE
Problem: PAIN - ADULT  Goal: Verbalizes/displays adequate comfort level or baseline comfort level  Description: Interventions:  - Encourage patient to monitor pain and request assistance  - Assess pain using appropriate pain scale  - Administer analgesics based on type and severity of pain and evaluate response  - Implement non-pharmacological measures as appropriate and evaluate response  - Consider cultural and social influences on pain and pain management  - Notify physician/advanced practitioner if interventions unsuccessful or patient reports new pain  Outcome: Progressing     Problem: INFECTION - ADULT  Goal: Absence or prevention of progression during hospitalization  Description: INTERVENTIONS:  - Assess and monitor for signs and symptoms of infection  - Monitor lab/diagnostic results  - Monitor all insertion sites, i.e. indwelling lines, tubes, and drains  - Monitor endotracheal if appropriate and nasal secretions for changes in amount and color  - [place] appropriate cooling/warming therapies per order  - Administer medications as ordered  - Instruct and encourage patient and family to use good hand hygiene technique  - Identify and instruct in appropriate isolation precautions for identified infection/condition  Outcome: Progressing  Goal: Absence of fever/infection during neutropenic period  Description: INTERVENTIONS:  - Monitor WBC    Outcome: Progressing     Problem: SAFETY ADULT  Goal: Patient will remain free of falls  Description: INTERVENTIONS:  - Educate patient/family on patient safety including physical limitations  - Instruct patient to call for assistance with activity   - Consult OT/PT to assist with strengthening/mobility   - Keep Call bell within reach  - Keep bed low and locked with side rails adjusted as appropriate  - Keep care items and personal belongings within reach  - Initiate and maintain comfort rounds  - Make Fall Risk Sign visible to staff  - Offer Toileting every 2 Hours,  in advance of need  - Initiate/Maintain alarm  - Obtain necessary fall risk management equipment  - Apply yellow socks and bracelet for high fall risk patients  - Consider moving patient to room near nurses station  Outcome: Progressing  Goal: Maintain or return to baseline ADL function  Description: INTERVENTIONS:  -  Assess patient's ability to carry out ADLs; assess patient's baseline for ADL function and identify physical deficits which impact ability to perform ADLs (bathing, care of mouth/teeth, toileting, grooming, dressing, etc.)  - Assess/evaluate cause of self-care deficits   - Assess range of motion  - Assess patient's mobility; develop plan if impaired  - Assess patient's need for assistive devices and provide as appropriate  - Encourage maximum independence but intervene and supervise when necessary  - Involve family in performance of ADLs  - Assess for home care needs following discharge   - Consider OT consult to assist with ADL evaluation and planning for discharge  - Provide patient education as appropriate  Outcome: Progressing  Goal: Maintains/Returns to pre admission functional level  Description: INTERVENTIONS:  - Perform AM-PAC 6 Click Basic Mobility/ Daily Activity assessment daily.  - Set and communicate daily mobility goal to care team and patient/family/caregiver.   - Collaborate with rehabilitation services on mobility goals if consulted  - Perform Range of Motion 3 times a day.  - Reposition patient every 2 hours  - Out of bed for toileting  - Record patient progress and toleration of activity level   Outcome: Progressing     Problem: DISCHARGE PLANNING  Goal: Discharge to home or other facility with appropriate resources  Description: INTERVENTIONS:  - Identify barriers to discharge w/patient and caregiver  - Arrange for needed discharge resources and transportation as appropriate  - Identify discharge learning needs (meds, wound care, etc.)  - Arrange for interpretive services to assist  at discharge as needed  - Refer to Case Management Department for coordinating discharge planning if the patient needs post-hospital services based on physician/advanced practitioner order or complex needs related to functional status, cognitive ability, or social support system  Outcome: Progressing     Problem: Knowledge Deficit  Goal: Patient/family/caregiver demonstrates understanding of disease process, treatment plan, medications, and discharge instructions  Description: Complete learning assessment and assess knowledge base.  Interventions:  - Provide teaching at level of understanding  - Provide teaching via preferred learning methods  Outcome: Progressing     Problem: NEUROSENSORY - ADULT  Goal: Achieves stable or improved neurological status  Description: INTERVENTIONS  - Monitor and report changes in neurological status  - Monitor vital signs such as temperature, blood pressure, glucose, and any other labs ordered   - Initiate measures to prevent increased intracranial pressure  - Monitor for seizure activity and implement precautions if appropriate      Outcome: Progressing  Goal: Remains free of injury related to seizures activity  Description: INTERVENTIONS  - Maintain airway, patient safety  and administer oxygen as ordered  - Monitor patient for seizure activity, document and report duration and description of seizure to physician/advanced practitioner  - If seizure occurs,  ensure patient safety during seizure  - Reorient patient post seizure  - Seizure pads on all 4 side rails  - Instruct patient/family to notify RN of any seizure activity including if an aura is experienced  - Instruct patient/family to call for assistance with activity based on nursing assessment  - Administer anti-seizure medications if ordered    Outcome: Progressing  Goal: Achieves maximal functionality and self care  Description: INTERVENTIONS  - Monitor swallowing and airway patency with patient fatigue and changes in  neurological status  - Encourage and assist patient to increase activity and self care.   - Encourage visually impaired, hearing impaired and aphasic patients to use assistive/communication devices  Outcome: Progressing     Problem: CARDIOVASCULAR - ADULT  Goal: Maintains optimal cardiac output and hemodynamic stability  Description: INTERVENTIONS:  - Monitor I/O, vital signs and rhythm  - Monitor for S/S and trends of decreased cardiac output  - Administer and titrate ordered vasoactive medications to optimize hemodynamic stability  - Assess quality of pulses, skin color and temperature  - Assess for signs of decreased coronary artery perfusion  - Instruct patient to report change in severity of symptoms  Outcome: Progressing  Goal: Absence of cardiac dysrhythmias or at baseline rhythm  Description: INTERVENTIONS:  - Continuous cardiac monitoring, vital signs, obtain 12 lead EKG if ordered  - Administer antiarrhythmic and heart rate control medications as ordered  - Monitor electrolytes and administer replacement therapy as ordered  Outcome: Progressing     Problem: RESPIRATORY - ADULT  Goal: Achieves optimal ventilation and oxygenation  Description: INTERVENTIONS:  - Assess for changes in respiratory status  - Assess for changes in mentation and behavior  - Position to facilitate oxygenation and minimize respiratory effort  - Oxygen administered by appropriate delivery if ordered  - Initiate smoking cessation education as indicated  - Encourage broncho-pulmonary hygiene including cough, deep breathe, Incentive Spirometry  - Assess the need for suctioning and aspirate as needed  - Assess and instruct to report SOB or any respiratory difficulty  - Respiratory Therapy support as indicated  Outcome: Progressing     Problem: GASTROINTESTINAL - ADULT  Goal: Minimal or absence of nausea and/or vomiting  Description: INTERVENTIONS:  - Administer IV fluids if ordered to ensure adequate hydration  - Maintain NPO status  until nausea and vomiting are resolved  - Nasogastric tube if ordered  - Administer ordered antiemetic medications as needed  - Provide nonpharmacologic comfort measures as appropriate  - Advance diet as tolerated, if ordered  - Consider nutrition services referral to assist patient with adequate nutrition and appropriate food choices  Outcome: Progressing  Goal: Maintains or returns to baseline bowel function  Description: INTERVENTIONS:  - Assess bowel function  - Encourage oral fluids to ensure adequate hydration  - Administer IV fluids if ordered to ensure adequate hydration  - Administer ordered medications as needed  - Encourage mobilization and activity  - Consider nutritional services referral to assist patient with adequate nutrition and appropriate food choices  Outcome: Progressing  Goal: Maintains adequate nutritional intake  Description: INTERVENTIONS:  - Monitor percentage of each meal consumed  - Identify factors contributing to decreased intake, treat as appropriate  - Assist with meals as needed  - Monitor I&O, weight, and lab values if indicated  - Obtain nutrition services referral as needed  Outcome: Progressing  Goal: Establish and maintain optimal ostomy function  Description: INTERVENTIONS:  - Assess bowel function  - Encourage oral fluids to ensure adequate hydration  - Administer IV fluids if ordered to ensure adequate hydration   - Administer ordered medications as needed  - Encourage mobilization and activity  - Nutrition services referral to assist patient with appropriate food choices  - Assess stoma site  - Consider wound care consult   Outcome: Progressing  Goal: Oral mucous membranes remain intact  Description: INTERVENTIONS  - Assess oral mucosa and hygiene practices  - Implement preventative oral hygiene regimen  - Implement oral medicated treatments as ordered  - Initiate Nutrition services referral as needed  Outcome: Progressing     Problem: GENITOURINARY - ADULT  Goal: Maintains  or returns to baseline urinary function  Description: INTERVENTIONS:  - Assess urinary function  - Encourage oral fluids to ensure adequate hydration if ordered  - Administer IV fluids as ordered to ensure adequate hydration  - Administer ordered medications as needed  - Offer frequent toileting  - Follow urinary retention protocol if ordered  Outcome: Progressing  Goal: Absence of urinary retention  Description: INTERVENTIONS:  - Assess patient’s ability to void and empty bladder  - Monitor I/O  - Bladder scan as needed  - Discuss with physician/AP medications to alleviate retention as needed  - Discuss catheterization for long term situations as appropriate  Outcome: Progressing  Goal: Urinary catheter remains patent  Description: INTERVENTIONS:  - Assess patency of urinary catheter  - If patient has a chronic peace, consider changing catheter if non-functioning  - Follow guidelines for intermittent irrigation of non-functioning urinary catheter  Outcome: Progressing     Problem: METABOLIC, FLUID AND ELECTROLYTES - ADULT  Goal: Electrolytes maintained within normal limits  Description: INTERVENTIONS:  - Monitor labs and assess patient for signs and symptoms of electrolyte imbalances  - Administer electrolyte replacement as ordered  - Monitor response to electrolyte replacements, including repeat lab results as appropriate  - Instruct patient on fluid and nutrition as appropriate  Outcome: Progressing  Goal: Fluid balance maintained  Description: INTERVENTIONS:  - Monitor labs   - Monitor I/O and WT  - Instruct patient on fluid and nutrition as appropriate  - Assess for signs & symptoms of volume excess or deficit  Outcome: Progressing  Goal: Glucose maintained within target range  Description: INTERVENTIONS:  - Monitor Blood Glucose as ordered  - Assess for signs and symptoms of hyperglycemia and hypoglycemia  - Administer ordered medications to maintain glucose within target range  - Assess nutritional intake and  initiate nutrition service referral as needed  Outcome: Progressing     Problem: SKIN/TISSUE INTEGRITY - ADULT  Goal: Skin Integrity remains intact(Skin Breakdown Prevention)  Description: Assess:  -Perform Carmelo assessment  -Clean and moisturize skin  -Inspect skin when repositioning, toileting, and assisting with ADLS  -Assess under medical devices  -Assess extremities for adequate circulation and sensation     Bed Management:  -Have minimal linens on bed & keep smooth, unwrinkled  -Change linens as needed when moist or perspiring  -Avoid sitting or lying in one position for more than 2 hours while in bed  -Keep HOB at 30 degrees     Toileting:  -Offer bedside commode  -Assess for incontinence   -Use incontinent care products after each incontinent episode     Activity:  -Mobilize patient 3 times a day  -Encourage activity and walks on unit  -Encourage or provide ROM exercises   -Turn and reposition patient every 2 Hours  -Use appropriate equipment to lift or move patient in bed  -Instruct/ Assist with weight shifting  when out of bed in chair  -Consider limitation of chair time 2 hour intervals    Skin Care:  -Avoid use of baby powder, tape, friction and shearing, hot water or constrictive clothing  -Relieve pressure over bony prominences   -Do not massage red bony areas    Next Steps:  -Teach patient strategies to minimize risks    -Consider consults to  interdisciplinary teams  Outcome: Progressing  Goal: Incision(s), wounds(s) or drain site(s) healing without S/S of infection  Description: INTERVENTIONS  - Assess and document dressing, incision, wound bed, drain sites and surrounding tissue  - Provide patient and family education  - Perform skin care/dressing changes   Outcome: Progressing  Goal: Pressure injury heals and does not worsen  Description: Interventions:  - Implement low air loss mattress or specialty surface (Criteria met)  - Apply silicone foam dressing  - Instruct/assist with weight shifting  every 160 minutes when in chair   - Limit chair time to 2 hour intervals  - Use special pressure reducing interventions when in chair   - Apply fecal or urinary incontinence containment device   - Perform passive or active ROM   - Turn and reposition patient & offload bony prominences every 2 hours   - Utilize friction reducing device or surface for transfers   - Consider consults to  interdisciplinary teams   - Use incontinent care products after each incontinent episode   - Consider nutrition services referral as needed  Outcome: Progressing     Problem: HEMATOLOGIC - ADULT  Goal: Maintains hematologic stability  Description: INTERVENTIONS  - Assess for signs and symptoms of bleeding or hemorrhage  - Monitor labs  - Administer supportive blood products/factors as ordered and appropriate  Outcome: Progressing     Problem: MUSCULOSKELETAL - ADULT  Goal: Maintain or return mobility to safest level of function  Description: INTERVENTIONS:  - Assess patient's ability to carry out ADLs; assess patient's baseline for ADL function and identify physical deficits which impact ability to perform ADLs (bathing, care of mouth/teeth, toileting, grooming, dressing, etc.)  - Assess/evaluate cause of self-care deficits   - Assess range of motion  - Assess patient's mobility  - Assess patient's need for assistive devices and provide as appropriate  - Encourage maximum independence but intervene and supervise when necessary  - Involve family in performance of ADLs  - Assess for home care needs following discharge   - Consider OT consult to assist with ADL evaluation and planning for discharge  - Provide patient education as appropriate  Outcome: Progressing  Goal: Maintain proper alignment of affected body part  Description: INTERVENTIONS:  - Support, maintain and protect limb and body alignment  - Provide patient/ family with appropriate education  Outcome: Progressing     Problem: Nutrition/Hydration-ADULT  Goal:  Nutrient/Hydration intake appropriate for improving, restoring or maintaining nutritional needs  Description: Monitor and assess patient's nutrition/hydration status for malnutrition. Collaborate with interdisciplinary team and initiate plan and interventions as ordered.  Monitor patient's weight and dietary intake as ordered or per policy. Utilize nutrition screening tool and intervene as necessary. Determine patient's food preferences and provide high-protein, high-caloric foods as appropriate.     INTERVENTIONS:  - Monitor oral intake, urinary output, labs, and treatment plans  - Assess nutrition and hydration status and recommend course of action  - Evaluate amount of meals eaten  - Assist patient with eating if necessary   - Allow adequate time for meals  - Recommend/ encourage appropriate diets, oral nutritional supplements, and vitamin/mineral supplements  - Order, calculate, and assess calorie counts as needed  - Recommend, monitor, and adjust tube feedings and TPN/PPN based on assessed needs  - Assess need for intravenous fluids  - Provide specific nutrition/hydration education as appropriate  - Include patient/family/caregiver in decisions related to nutrition  Outcome: Progressing     Problem: Prexisting or High Potential for Compromised Skin Integrity  Goal: Skin integrity is maintained or improved  Description: INTERVENTIONS:  - Identify patients at risk for skin breakdown  - Assess and monitor skin integrity  - Assess and monitor nutrition and hydration status  - Monitor labs   - Assess for incontinence   - Turn and reposition patient  - Assist with mobility/ambulation  - Relieve pressure over bony prominences  - Avoid friction and shearing  - Provide appropriate hygiene as needed including keeping skin clean and dry  - Evaluate need for skin moisturizer/barrier cream  - Collaborate with interdisciplinary team   - Patient/family teaching  - Consider wound care consult   Outcome: Progressing      Problem: SAFETY,RESTRAINT: NV/NON-SELF DESTRUCTIVE BEHAVIOR  Goal: Remains free of harm/injury (restraint for non violent/non self-detsructive behavior)  Description: INTERVENTIONS:  - Instruct patient/family regarding restraint use   - Assess and monitor physiologic and psychological status   - Provide interventions and comfort measures to meet assessed patient needs   - Identify and implement measures to help patient regain control  - Assess readiness for release of restraint   Outcome: Progressing  Goal: Returns to optimal restraint-free functioning  Description: INTERVENTIONS:  - Assess the patient's behavior and symptoms that indicate continued need for restraint  - Identify and implement measures to help patient regain control  - Assess readiness for release of restraint   Outcome: Progressing

## 2024-11-21 NOTE — QUICK NOTE
MRI Brain did not reveal any infarct, mass, or hemorrhage.    No rec changes to ASM current regimen at this time.    Remains w/o further need for VEEG from neuro view.    Please notify neuro team for any new concerns or Q's.

## 2024-11-21 NOTE — PROGRESS NOTES
Progress Note - Infectious Disease   Name: Adelina Soto 64 y.o. female I MRN: [id number]  Unit/Bed#: MICU 01 I Date of Admission: 11/12/2024   Date of Service: 11/21/2024 I Hospital Day: 9    Assessment & Plan  Seizure (HCC)   Patient presented with acute encephalopathy and seizure-like activity.  Video EEG concerning for status epilepticus.  MRI brain without contrast was limited by motion but showed restricted diffusion in the left temporal lobe which could be postictal versus infection.  Status post LP 11/12 and CSF studies unremarkable, not consistent with infection with 2 WBCs.  Protein mildly elevated which could be due to seizure activity itself.  ME panel negative, stand alone HSV PCR also negative.  However with new onset seizures and possible abnormality of the temporal lobe HSV remains a possibility as PCR may be negative and CSF bland and early disease.  CSF culture with no growth.  The patient has grown E. coli and ESBL Proteus in urine culture but in the absence of prior systemic signs of infection or localizing symptoms UTI, doubt this is causing status epilepticus. HIV negative. Fever and leukocytosis improved.  Remains poorly responsive off sedation but having some reflexes today.  IR unable to perform patient's LP due to technical difficulty.  MRI brain without evidence of acute infarct, intracranial hemorrhage or mass              -continue IV acyclovir 10 mg/kg every 12 hours.  Will complete an empiric 14-day course since unable to perform LP              -Monitor CBC and CMP while on acyclovir to monitor for toxicities   -AEDs per neurology  Bacteriuria  UA with pyuria and urine culture growing E. coli and ESBL Proteus.  No symptoms of infection prior to admission.  Unlikely cause of status epilepticus however in absence of clear cause of seizure activity treated for possible cystitis with 3 days zosyn   -monitor off antibiotics   Shock (HCC)  May be multifactorial related to possible  infection, sedating medications.  Off vasopressor support   -Hemodynamic support per critical care team   -Antimicrobials as above  Acute hypoxic respiratory failure (HCC)  Patient intubated for airway protection.  Status post bronchoscopy 11/13 for mucous plugging and thick left-sided secretions seen.  Chest x-ray not consistent with pneumonia.  BAL culture shows growth of mixed respiratory viktoriya.   -Ventilator management per critical care team  Morbid obesity with BMI of 50.0-59.9, adult ([facility])  Affects antimicrobial dosing  CKD (chronic kidney disease)  Lab Results   Component Value Date    EGFR 36 11/21/2024    EGFR 34 11/20/2024    EGFR 34 11/20/2024    CREATININE 1.51 (H) 11/21/2024    CREATININE 1.57 (H) 11/20/2024    CREATININE 1.58 (H) 11/20/2024   Baseline creatinine 1.7-1.9.  Creatinine stable, slightly below baseline.  Dose adjust antibiotics as needed for creatinine clearance.  Monitor creatinine daily  Type 2 diabetes mellitus with hyperglycemia, unspecified whether long term insulin use ([facility])  Lab Results   Component Value Date    HGBA1C 5.7 (H) 11/13/2024   Diabetes well-controlled.  Glycemic management per primary team    Above management plan to continue IV acyclovir discussed with the critical care resident.  Discussed with the patient's partner at bedside.  ID will follow.    Antibiotics:  Acyclovir day 9    Subjective   The patient remains intubated.  She is now opening her eyes but not following commands.  No fevers.  No other acute events.  Off pressors    Temp:  [97.2 °F (36.2 °C)-99.5 °F (37.5 °C)] 99 °F (37.2 °C)  HR:  [101-126] 121  BP: ()/(47-70) 130/61  Resp:  [17-20] 20  SpO2:  [91 %-99 %] 91 %  O2 Device: Ventilator  FiO2 (%):  [40] 40    General: Intubated, not responsive   Head: EEG leads in place  Mouth: ET tube in place  Neck: trachea midline   CV: RRR, no murmurs   Lungs: clear to auscultation bilaterally   Abdomen: no distension   Skin: no rashes, cellulitis.  Intertrigo in  her skin folds    Lab Results: I have reviewed the following results:  Results from last 7 days   Lab Units 11/21/24  0443 11/20/24  0507 11/19/24  1714   WBC Thousand/uL 9.73 8.35 7.27   HEMOGLOBIN g/dL 9.9* 9.6* 9.9*   PLATELETS Thousands/uL 189 159 164     Results from last 7 days   Lab Units 11/21/24  0717 11/20/24  1418 11/20/24  0507 11/18/24  0447 11/17/24  0517 11/16/24  0503   SODIUM mmol/L 144 141 138   < > 140 137   POTASSIUM mmol/L 4.1 4.3 3.6   < > 4.2 3.8   CHLORIDE mmol/L 106 102 100   < > 108 110*   CO2 mmol/L 33* 33* 31   < > 24 20*   BUN mg/dL 25 26* 23   < > 16 15   CREATININE mg/dL 1.51* 1.57* 1.58*   < > 1.48* 1.47*   EGFR ml/min/1.73sq m 36 34 34   < > 36 37   CALCIUM mg/dL 8.7 8.6 8.4   < > 7.8* 7.3*   AST U/L 13  --   --   --  15 17   ALT U/L 6*  --   --   --  6* 6*   ALK PHOS U/L 83  --   --   --  96 92   ALBUMIN g/dL 2.5*  --   --   --  2.4* 2.3*    < > = values in this interval not displayed.                   I have personally reviewed pertinent imaging reports and images in PACS.  MRI brain without acute infarct, intracranial hemorrhage or mass

## 2024-11-21 NOTE — TELEPHONE ENCOUNTER
STILL ADMITTED:11/12/2024 - present (8 days)  [facility]        HFU/ SL HIGINIO/ SEIZURE      ----- Message from Chris Galarza DO sent at 11/20/2024 11:26 AM EST -----  Regarding: HFU  Adelina Soto will need follow-up in in 6 weeks following discharge with general neurology team for Other in 60 minute appointment. They will not require outpatient neurological testing. Thank you

## 2024-11-21 NOTE — ASSESSMENT & PLAN NOTE
May be multifactorial related to possible infection, sedating medications.  Off vasopressor support   -Hemodynamic support per critical care team   -Antimicrobials as above

## 2024-11-21 NOTE — CASE MANAGEMENT
Case Management Discharge Planning Note    [facility]  [facility] [id number]  : 1960 Date 2024       Current Admission Date: 2024  Current Admission Diagnosis:Seizure (HCC)   Patient Active Problem List    Diagnosis Date Noted Date Diagnosed    Abnormal EKG 11/15/2024     Elevated troponin level not due myocardial infarction 11/15/2024     Bacteriuria 11/15/2024     Shock (HCC) 11/15/2024     Acute hypoxic respiratory failure (HCC) 11/15/2024     Seizure (HCC) 2024     Diabetic nephropathy associated with type 2 diabetes mellitus (HCC) 2024     Coagulopathy (HCC) 2024     Type 2 diabetes mellitus with hyperglycemia, unspecified whether long term insulin use (HCC) 2024     Chronic anticoagulation 2023     Chronic respiratory failure with hypercapnia ([facility]) 2023     CKD (chronic kidney disease) 2023     Anemia in stage 3b chronic kidney disease  (HCC) 2023     Chronic respiratory failure with hypoxia (HCC) 2023     History of hysterectomy 2022     Panniculitis 2022     Abnormal CT scan 2022     Panniculus 2022     Bilateral pleural effusion 2022     Cardiomegaly 2022     Depression, recurrent (HCC) 2022     Pre-ulcerative corn or callous 2022     Morbid obesity with BMI of 50.0-59.9, adult (HCC) 2021     Secondary hyperparathyroidism of renal origin (HCC) 2021     GERD (gastroesophageal reflux disease) 2020     Anxiety 2020     Chronic constipation 2020     Paroxysmal atrial fibrillation (HCC) 2020     COPD with asthma (HCC) 2020     Hypothyroid 2020     Cellulitis 09/10/2020     H/O right nephrectomy 09/10/2020     Hyperparathyroidism (HCC) 2020     Dyslipidemia 2019     Hypertensive chronic kidney disease with stage 1 through stage 4 chronic kidney disease, or unspecified chronic kidney disease  10/04/2018     Persistent proteinuria 10/04/2018     Iron deficiency 10/04/2018       LOS (days): 9  Geometric Mean LOS (GMLOS) (days): 4.4  Days to GMLOS:-4.2     OBJECTIVE:  Risk of Unplanned Readmission Score: 21.74         Current admission status: Inpatient   Preferred Pharmacy:   [id number] - [place], [place] - [address]  [address] [address]  Phone: [phone] Fax: [phone]    [facility] [place], [place] - [address]  [address]  [address]  [place] [address]  Phone: [phone] Fax: [phone]    Primary Care Provider: TULIO Beaver    Primary Insurance: [facility]  Secondary Insurance: [facility]    DISCHARGE DETAILS:               Other Referral/Resources/Interventions Provided:  Referral Comments: Per chart review vEEG discontinued. MRI negative for mass, infarct, hemorrhage. Pt remains intubated on vent SCMV 40% FiO2. Responds to pain.  CM to follow for dcp needs pending medical course.

## 2024-11-21 NOTE — ASSESSMENT & PLAN NOTE
Patient presented with acute encephalopathy and seizure-like activity.  Video EEG concerning for status epilepticus.  MRI brain without contrast was limited by motion but showed restricted diffusion in the left temporal lobe which could be postictal versus infection.  Status post LP 11/12 and CSF studies unremarkable, not consistent with infection with 2 WBCs.  Protein mildly elevated which could be due to seizure activity itself.  ME panel negative, stand alone HSV PCR also negative.  However with new onset seizures and possible abnormality of the temporal lobe HSV remains a possibility as PCR may be negative and CSF bland and early disease.  CSF culture with no growth.  The patient has grown E. coli and ESBL Proteus in urine culture but in the absence of prior systemic signs of infection or localizing symptoms UTI, doubt this is causing status epilepticus. HIV negative. Fever and leukocytosis improved.  Remains poorly responsive off sedation but having some reflexes today.  IR unable to perform patient's LP due to technical difficulty.  MRI brain without evidence of acute infarct, intracranial hemorrhage or mass              -continue IV acyclovir 10 mg/kg every 12 hours.  Will complete an empiric 14-day course since unable to perform LP              -Monitor CBC and CMP while on acyclovir to monitor for toxicities   -AEDs per neurology

## 2024-11-21 NOTE — PROGRESS NOTES
Progress Note - Critical Care/ICU   Name: Adelina Soto 64 y.o. female I MRN: [id number]  [facility] Date of Admission: 11/12/2024   Date of Service: 11/21/2024 I Hospital Day: 9       Summary:  Patient is a 64 year old female with history of Afib on Warfarin, HTN, T2DM, morbid obesity, HLD, hypothyroidism, initially presented to [facility] on 11/12 after being found by her partner at home with AMS, seizure-like activity. EMS witnessed tonic-clonic seizure-like activity, treated with 6 mg of Versed. 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 Was subsequently intubated for airway protection and transferred to [facility]. Initial LP generally unremarkable apart from RBC's 300's from otherwise atraumatic tap, negative ME panel and HSV. 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     [facility], during evaluation pressors were weaned and AEDs adjusted. Underwent CT head which appeared unchanged, LP on 11/19 with fluoro unsuccessful. Off video EEG 11/20, repeat MRI showed no acute findings.  Ongoing discussion regarding goals of care, tracheostomy/feeding tube in LTAC versus comfort care, POA thus far remains treatment oriented.    Assessment & Plan     Neuro:   Diagnosis: Status epilepticus  Initially presented with AMS, witnessed seizures  Initiral MRI c/f possible HSV encephalitis, started on acyclovir  Neurology following, vEEG initially showing left frontal seizures, burst suppressed initially, now on Keppra 1g q12h, Phenytoin 100 mg q8h, Depacon 375 mg q6h  LP showed glucose 89, protein 74, 339 RBC's, 2 WBC's, ME panel negative  ID following, recommend continuing acyclovir 10 mg/kg every 12 hours for time being  Off vEEG  11/20  Repeat MRI 11/20 unremarkable    Diagnosis: H/o depression  Plan:  Continue home Zoloft     CV:   Diagnosis: Shock/hypotension, likely in setting of sedation  Plan:  MAP 65-70, off pressors     Diagnosis: Elevated troponin  54 on arrival, peaked at 1887 on 11/13  ECG without ST changes  Likely demand related due to RVR and/or hypotension as above  Plan:  Monitor telemetry, rate control < 110 bpm     Diagnosis: H/o Afib on Warfarin  Plan:  Lopressor 25 mg PO BID  AC with heparin gtt, transition back to Warfarin when appropriate     Pulm:  Diagnosis: Acute hypoxic respiratory failure  Intubated on arrival for airway protection due to AMS  Vent settings: SCMV, 10/420/8/40%  Bronch 11/12 with generalized edematous friable mucosa and moderate thick purulent secretions, culture with mixed respiratory viktoriya  Initial chest x-ray on arrival with mild edema more so on the right, left-sided whiteout likely from mucous plugging and atelectasis, given improvement after bronchoscopy, now continues to have crackles bilaterally  Repeat bronchoscopy 11/20 generally unremarkable    GI:   Diagnosis: GERD  Plan:  Continue home pepcid     :   Diagnosis: CKD  Baseline Cr 1.4-1.8  Remains baseline throughout hospital stay  Monitor renal fx, monitor I/O's, avoid nephrotoxins     F/E/N:   F - none  E - K > 4, Mg > 2, Phos > 3  N - TF with Vital 1.2 @ 55/hr     Heme/Onc:   Diagnosis: Anemia  Hgb 9-10 this admission  Stable thus far, transfuse if < 7     Endo:   Diagnosis: Hypothyroidism  TSH 0.29, T4 1.23  Continue home levothyroxine     Diagnosis: T2DM  Diet controlled, not on medications outpatient  Monitor BG for goal 140-180  Add SSI if needed     ID:   Diagnosis: Possible encephalitis  Initial MRI brain w/ c/f HSV encephalitis  LP and ME panel neg  ID following, continue Acyclovir 10 mg/kg q12h for 2 weeks total     Diagnosis: UTI, possible pneumonia  Urine culture growing E. coli and ESBL proteus  Earlier bronchoscopy with  copious purulent secretions, however culture with mixed respiratory viktoriya  Afebrile, WBC downtrending, monitor off antibiotics     MSK/Skin:   Diagnosis: Pressure wounds  Continue wound care     Disposition: Critical care    ICU Core Measures     Vented Patient  VAP Bundle  VAP bundle ordered     A: Assess, Prevent, and Manage Pain Has pain been assessed? Yes  Need for changes to pain regimen? No   B: Both Spontaneous Awakening Trials (SATs) and Spontaneous Breathing Trials (SBTs) Plan to perform spontaneous awakening trial today? Yes   Plan to perform spontaneous breathing trial today? Yes   Obvious barriers to extubation? Yes   C: Choice of Sedation RASS Goal: 0 Alert and Calm  Need for changes to sedation or analgesia regimen? No   D: Delirium CAM-ICU: Unable to perform secondary to Acute cognitive dysfunction   E: Early Mobility  Plan for early mobility? NA   F: Family Engagement Plan for family engagement today? Yes       Antibiotic Review: Patient on appropriate coverage based on culture data.     Review of Invasive Devices:    Stringer Plan: Continue for accurate I/O monitoring for 48 hours  Central access plan: Medications requiring central line      Prophylaxis:  VTE VTE covered by:  heparin (porcine), Intravenous, 10 Units/kg/hr at 11/21/24 0247  heparin (porcine), Intravenous  heparin (porcine), Intravenous       Stress Ulcer  covered byfamotidine (PEPCID) 20 mg tablet [[id number]] (Long-Term Med), famotidine (PEPCID) tablet 20 mg [[id number]], pantoprazole (PROTONIX) 40 mg tablet [[id number]] (Long-Term Med)         24 Hour Events : AISSATOU overnight, INR improved to 1.55 this morning. Tentatively pending repeat LP. Unfortunately still no signs of neurological improvement, remains on Vasopressin as well as vEEG.    Subjective   Review of Systems: Review of Systems not obtainable due to Clinical Condition    Objective :                   Vitals I/O      Most Recent Min/Max in 24hrs   Temp 98.4 °F (36.9 °C) Temp   Min: 97.2 °F (36.2 °C)  Max: 99.5 °F (37.5 °C)   Pulse (!) 109 Pulse  Min: 101  Max: 126   Resp 17 Resp  Min: 17  Max: 21   BP (!) 87/51 BP  Min: 66/47  Max: 155/80   O2 Sat 95 % SpO2  Min: 91 %  Max: 99 %      Intake/Output Summary (Last 24 hours) at 11/21/2024 1251  Last data filed at 11/21/2024 1226  Gross per 24 hour   Intake 2887.62 ml   Output 2750 ml   Net 137.62 ml       Diet Enteral/Parenteral; Tube Feeding No Oral Diet; Vital AF 1.2; Cyclic; 55; 22 hours    Invasive Monitoring           Physical Exam   Physical Exam  Eyes:      Comments: Pupils bilaterally reactive, sluggish   Skin:     General: Skin is warm and dry.   HENT:      Head: Normocephalic and atraumatic.      Mouth/Throat:      Mouth: No angioedema.   Cardiovascular:      Rate and Rhythm: Tachycardia present. Rhythm irregular.   Musculoskeletal:      Right lower leg: Trace Edema present.      Left lower leg: Trace Edema present.      Comments: RUE edema noted.   Abdominal: General: There is no distension.      Palpations: Abdomen is soft.      Tenderness: There is no abdominal tenderness.   Constitutional:       General: She is not in acute distress.     Interventions: She is intubated. She is not sedated.     Comments: Patient is unresponsive   Pulmonary:      Effort: She is intubated.      Breath sounds: Normal breath sounds.   Neurological:      Mental Status: She is unresponsive.        Cough reflex and gag reflex intact.      Comments: Minimal spontaneous movement of all four extremities.          Diagnostic Studies        Lab Results: I have reviewed the following results:     Medications:  Scheduled PRN   acyclovir, 10 mg/kg (Adjusted), Q12H  atorvastatin, 10 mg, Daily With Dinner  chlorhexidine, 15 mL, Q12H LINO  famotidine, 20 mg, Daily  insulin lispro, 2-12 Units, Q6H LINO  levalbuterol, 1.25 mg, TID  levETIRAcetam, 750 mg, Q12H LINO  levothyroxine, 100 mcg, Early Morning  metoprolol tartrate, 25 mg, Q12H LINO  EARLINE ANTIFUNGAL, ,  BID  multi-electrolyte, 500 mL, Once  sertraline, 50 mg, Daily  valproate sodium, 500 mg, Q6H LINO      acetaminophen, 1,000 mg, Q6H PRN  albuterol, 2.5 mg, Q6H PRN  heparin (porcine), 10,000 Units, Q6H PRN  heparin (porcine), 5,000 Units, Q6H PRN  LORazepam, 2 mg, Q4H PRN  ondansetron, 4 mg, Q4H PRN       Continuous    heparin (porcine), 3-30 Units/kg/hr (Order-Specific), Last Rate: 10 Units/kg/hr (11/21/24 0247)         Labs:   CBC    Recent Labs     11/20/24  0507 11/21/24  0443   WBC 8.35 9.73   HGB 9.6* 9.9*   HCT 33.1* 34.4*    189     BMP    Recent Labs     11/20/24  1418 11/21/24  0717   SODIUM 141 144   K 4.3 4.1    106   CO2 33* 33*   AGAP 6 5   BUN 26* 25   CREATININE 1.57* 1.51*   CALCIUM 8.6 8.7       Coags    Recent Labs     11/19/24  1714 11/19/24  2321 11/20/24  2115 11/21/24  0332 11/21/24  0717   INR 1.39*  --   --   --  1.18   PTT 37*   < > 76* 63*  --     < > = values in this interval not displayed.        Additional Electrolytes  Recent Labs     11/19/24  1953 11/20/24  0507 11/20/24  1418 11/21/24  0717   MG  --    < > 2.6 2.1   PHOS  --    < > 3.7 2.6   CAIONIZED 1.15  --   --   --     < > = values in this interval not displayed.          Blood Gas    Recent Labs     11/20/24  0507   PHART 7.375   UYO7HYY 54.6*   PO2ART 86.5   XZG2ZTK 31.2*   BEART 4.7   SOURCE Line, Arterial     Recent Labs     11/20/24  0507   SOURCE Line, Arterial    LFTs  Recent Labs     11/21/24  0717   ALT 6*   AST 13   ALKPHOS 83   ALB 2.5*   TBILI 0.25       Infectious  No recent results  Glucose  Recent Labs     11/19/24  1716 11/20/24  0507 11/20/24  1418 11/21/24  0717   GLUC 145* 121 134 119        Jose Luis Duff MD  [facility]  Internal Medicine Residency PGY-2

## 2024-11-22 LAB
AMMONIA PLAS-SCNC: 36 UMOL/L (ref 18–72)
ANION GAP SERPL CALCULATED.3IONS-SCNC: 7 MMOL/L (ref 4–13)
APTT PPP: 59 SECONDS (ref 23–34)
BUN SERPL-MCNC: 25 MG/DL (ref 5–25)
CALCIUM SERPL-MCNC: 9.1 MG/DL (ref 8.4–10.2)
CHLORIDE SERPL-SCNC: 102 MMOL/L (ref 96–108)
CO2 SERPL-SCNC: 33 MMOL/L (ref 21–32)
CREAT SERPL-MCNC: 1.45 MG/DL (ref 0.6–1.3)
ERYTHROCYTE [DISTWIDTH] IN BLOOD BY AUTOMATED COUNT: 16.8 % (ref 11.6–15.1)
GFR SERPL CREATININE-BSD FRML MDRD: 37 ML/MIN/1.73SQ M
GLUCOSE SERPL-MCNC: 109 MG/DL (ref 65–140)
GLUCOSE SERPL-MCNC: 114 MG/DL (ref 65–140)
GLUCOSE SERPL-MCNC: 122 MG/DL (ref 65–140)
GLUCOSE SERPL-MCNC: 127 MG/DL (ref 65–140)
GLUCOSE SERPL-MCNC: 128 MG/DL (ref 65–140)
HCT VFR BLD AUTO: 32.1 % (ref 34.8–46.1)
HGB BLD-MCNC: 9.6 G/DL (ref 11.5–15.4)
LEVETIRACETAM SERPL-MCNC: 25.7 UG/ML (ref 12–46)
MAGNESIUM SERPL-MCNC: 1.8 MG/DL (ref 1.9–2.7)
MCH RBC QN AUTO: 31.8 PG (ref 26.8–34.3)
MCHC RBC AUTO-ENTMCNC: 29.9 G/DL (ref 31.4–37.4)
MCV RBC AUTO: 106 FL (ref 82–98)
PHOSPHATE SERPL-MCNC: 2.6 MG/DL (ref 2.3–4.1)
PLATELET # BLD AUTO: 226 THOUSANDS/UL (ref 149–390)
PMV BLD AUTO: 9.9 FL (ref 8.9–12.7)
POTASSIUM SERPL-SCNC: 4 MMOL/L (ref 3.5–5.3)
RBC # BLD AUTO: 3.02 MILLION/UL (ref 3.81–5.12)
SODIUM SERPL-SCNC: 142 MMOL/L (ref 135–147)
WBC # BLD AUTO: 10.09 THOUSAND/UL (ref 4.31–10.16)

## 2024-11-22 PROCEDURE — 82948 REAGENT STRIP/BLOOD GLUCOSE: CPT

## 2024-11-22 PROCEDURE — 99291 CRITICAL CARE FIRST HOUR: CPT | Performed by: STUDENT IN AN ORGANIZED HEALTH CARE EDUCATION/TRAINING PROGRAM

## 2024-11-22 PROCEDURE — 84100 ASSAY OF PHOSPHORUS: CPT

## 2024-11-22 PROCEDURE — 94640 AIRWAY INHALATION TREATMENT: CPT

## 2024-11-22 PROCEDURE — 94003 VENT MGMT INPAT SUBQ DAY: CPT

## 2024-11-22 PROCEDURE — 80048 BASIC METABOLIC PNL TOTAL CA: CPT

## 2024-11-22 PROCEDURE — 94669 MECHANICAL CHEST WALL OSCILL: CPT

## 2024-11-22 PROCEDURE — 83520 IMMUNOASSAY QUANT NOS NONAB: CPT | Performed by: PSYCHIATRY & NEUROLOGY

## 2024-11-22 PROCEDURE — 94664 DEMO&/EVAL PT USE INHALER: CPT

## 2024-11-22 PROCEDURE — 83735 ASSAY OF MAGNESIUM: CPT

## 2024-11-22 PROCEDURE — 99233 SBSQ HOSP IP/OBS HIGH 50: CPT | Performed by: STUDENT IN AN ORGANIZED HEALTH CARE EDUCATION/TRAINING PROGRAM

## 2024-11-22 PROCEDURE — 94760 N-INVAS EAR/PLS OXIMETRY 1: CPT

## 2024-11-22 PROCEDURE — 85730 THROMBOPLASTIN TIME PARTIAL: CPT | Performed by: STUDENT IN AN ORGANIZED HEALTH CARE EDUCATION/TRAINING PROGRAM

## 2024-11-22 PROCEDURE — 85027 COMPLETE CBC AUTOMATED: CPT

## 2024-11-22 PROCEDURE — 82140 ASSAY OF AMMONIA: CPT | Performed by: PSYCHIATRY & NEUROLOGY

## 2024-11-22 RX ORDER — LEVETIRACETAM 750 MG/1
750 TABLET ORAL EVERY 12 HOURS SCHEDULED
Status: DISCONTINUED | OUTPATIENT
Start: 2024-11-23 | End: 2024-12-04

## 2024-11-22 RX ORDER — SODIUM CHLORIDE, SODIUM GLUCONATE, SODIUM ACETATE, POTASSIUM CHLORIDE, MAGNESIUM CHLORIDE, SODIUM PHOSPHATE, DIBASIC, AND POTASSIUM PHOSPHATE .53; .5; .37; .037; .03; .012; .00082 G/100ML; G/100ML; G/100ML; G/100ML; G/100ML; G/100ML; G/100ML
500 INJECTION, SOLUTION INTRAVENOUS ONCE
Status: COMPLETED | OUTPATIENT
Start: 2024-11-22 | End: 2024-11-22

## 2024-11-22 RX ORDER — ENOXAPARIN SODIUM 150 MG/ML
1 INJECTION SUBCUTANEOUS EVERY 12 HOURS SCHEDULED
Status: DISCONTINUED | OUTPATIENT
Start: 2024-11-22 | End: 2024-11-30

## 2024-11-22 RX ORDER — LEVETIRACETAM 500 MG/5ML
750 INJECTION, SOLUTION, CONCENTRATE INTRAVENOUS EVERY 12 HOURS SCHEDULED
Status: COMPLETED | OUTPATIENT
Start: 2024-11-22 | End: 2024-11-22

## 2024-11-22 RX ORDER — MAGNESIUM SULFATE HEPTAHYDRATE 40 MG/ML
2 INJECTION, SOLUTION INTRAVENOUS ONCE
Status: COMPLETED | OUTPATIENT
Start: 2024-11-22 | End: 2024-11-22

## 2024-11-22 RX ADMIN — METOPROLOL TARTRATE 25 MG: 25 TABLET, FILM COATED ORAL at 21:52

## 2024-11-22 RX ADMIN — ACYCLOVIR SODIUM 775 MG: 50 INJECTION, SOLUTION INTRAVENOUS at 10:48

## 2024-11-22 RX ADMIN — VALPROATE SODIUM 500 MG: 100 INJECTION, SOLUTION INTRAVENOUS at 23:19

## 2024-11-22 RX ADMIN — LEVETIRACETAM 750 MG: 100 INJECTION, SOLUTION INTRAVENOUS at 21:52

## 2024-11-22 RX ADMIN — LEVALBUTEROL HYDROCHLORIDE 1.25 MG: 1.25 SOLUTION RESPIRATORY (INHALATION) at 13:50

## 2024-11-22 RX ADMIN — LEVALBUTEROL HYDROCHLORIDE 1.25 MG: 1.25 SOLUTION RESPIRATORY (INHALATION) at 19:03

## 2024-11-22 RX ADMIN — CHLORHEXIDINE GLUCONATE 0.12% ORAL RINSE 15 ML: 1.2 LIQUID ORAL at 08:46

## 2024-11-22 RX ADMIN — FAMOTIDINE 20 MG: 20 TABLET, FILM COATED ORAL at 08:46

## 2024-11-22 RX ADMIN — ACETAMINOPHEN 1000 MG: 10 INJECTION INTRAVENOUS at 06:38

## 2024-11-22 RX ADMIN — MAGNESIUM SULFATE HEPTAHYDRATE 2 G: 40 INJECTION, SOLUTION INTRAVENOUS at 08:46

## 2024-11-22 RX ADMIN — LEVETIRACETAM 750 MG: 100 INJECTION, SOLUTION INTRAVENOUS at 08:47

## 2024-11-22 RX ADMIN — MICONAZOLE NITRATE 1 APPLICATION: 20 CREAM TOPICAL at 17:53

## 2024-11-22 RX ADMIN — LEVOTHYROXINE SODIUM 100 MCG: 100 TABLET ORAL at 06:51

## 2024-11-22 RX ADMIN — VALPROATE SODIUM 500 MG: 100 INJECTION, SOLUTION INTRAVENOUS at 11:55

## 2024-11-22 RX ADMIN — ATORVASTATIN CALCIUM 10 MG: 10 TABLET, FILM COATED ORAL at 17:53

## 2024-11-22 RX ADMIN — SERTRALINE HYDROCHLORIDE 50 MG: 50 TABLET ORAL at 08:56

## 2024-11-22 RX ADMIN — ENOXAPARIN SODIUM 120 MG: 120 INJECTION SUBCUTANEOUS at 21:52

## 2024-11-22 RX ADMIN — METOPROLOL TARTRATE 25 MG: 25 TABLET, FILM COATED ORAL at 08:46

## 2024-11-22 RX ADMIN — MICONAZOLE NITRATE: 20 CREAM TOPICAL at 08:55

## 2024-11-22 RX ADMIN — ENOXAPARIN SODIUM 120 MG: 120 INJECTION SUBCUTANEOUS at 10:48

## 2024-11-22 RX ADMIN — VALPROATE SODIUM 500 MG: 100 INJECTION, SOLUTION INTRAVENOUS at 17:51

## 2024-11-22 RX ADMIN — SODIUM CHLORIDE, SODIUM GLUCONATE, SODIUM ACETATE, POTASSIUM CHLORIDE, MAGNESIUM CHLORIDE, SODIUM PHOSPHATE, DIBASIC, AND POTASSIUM PHOSPHATE 500 ML: .53; .5; .37; .037; .03; .012; .00082 INJECTION, SOLUTION INTRAVENOUS at 09:22

## 2024-11-22 RX ADMIN — ACYCLOVIR SODIUM 775 MG: 50 INJECTION, SOLUTION INTRAVENOUS at 21:52

## 2024-11-22 RX ADMIN — VALPROATE SODIUM 500 MG: 100 INJECTION, SOLUTION INTRAVENOUS at 04:55

## 2024-11-22 RX ADMIN — LEVALBUTEROL HYDROCHLORIDE 1.25 MG: 1.25 SOLUTION RESPIRATORY (INHALATION) at 07:21

## 2024-11-22 RX ADMIN — CHLORHEXIDINE GLUCONATE 0.12% ORAL RINSE 15 ML: 1.2 LIQUID ORAL at 21:52

## 2024-11-22 NOTE — ASSESSMENT & PLAN NOTE
Lab Results   Component Value Date    EGFR 37 11/22/2024    EGFR 36 11/21/2024    EGFR 34 11/20/2024    CREATININE 1.45 (H) 11/22/2024    CREATININE 1.51 (H) 11/21/2024    CREATININE 1.57 (H) 11/20/2024   Baseline creatinine 1.7-1.9.  Creatinine stable, slightly below baseline.  Dose adjust antibiotics as needed for creatinine clearance.  Monitor creatinine daily

## 2024-11-22 NOTE — PROCEDURES
Insert Complex Venous Access Line    Date/Time: 11/22/2024 10:38 AM    Performed by: Donna Helton RN  Authorized by: Jose Luis Duff MD    Patient location:  Bedside  Other Assisting Provider: Yes (comment) (Marina CARLIN,  Tech)    Consent:     Consent obtained: Not required.  Universal protocol:     Procedure explained and questions answered to patient or proxy's satisfaction: yes      Immediately prior to procedure, a time out was called: yes      Site/side marked: yes      Patient identity confirmed:  Arm band, provided demographic data and hospital-assigned identification number  Pre-procedure details:     Hand hygiene: Hand hygiene performed prior to insertion      Sterile barrier technique: All elements of maximal sterile technique followed      Skin preparation:  ChloraPrep    Skin preparation agent: Skin preparation agent completely dried prior to procedure    Procedure details:     Complex Venous Access Line Type: Midline      Complex Venous Access Line Indications: no peripheral vascular access      Orientation:  Left    Location:  Cephalic    Catheter size:  4 Fr    Total catheter length (cm):  20    Catheter out on skin (cm):  0    Max flow rate:  999    Arm circumference:  43    Patient evaluated for contraindications to access (i.e. fistula, thrombosis, etc): Yes      Site selection rationale:  Unable to thread right cephalic    Approach: percutaneous technique used      Patient position:  Flat    Ultrasound image availability:  Not saved    Sterile ultrasound techniques: Sterile gel and sterile probe covers were used      Number of attempts:  2    Successful placement: yes      Landmarks identified: yes    Anesthesia (see MAR for exact dosages):     Anesthesia method:  Local infiltration    Local anesthetic:  Lidocaine 1% w/o epi (2 ml)  Post-procedure details:     Post-procedure:  Dressing applied and securement device placed    Assessment:  Blood return through all ports and free fluid flow     Post-procedure complications: none      Patient tolerance of procedure:  Tolerated well, no immediate complications    Ref [id number]  Lot [id number]  Exp 2025-05-31

## 2024-11-22 NOTE — PLAN OF CARE
Problem: PAIN - ADULT  Goal: Verbalizes/displays adequate comfort level or baseline comfort level  Description: Interventions:  - Encourage patient to monitor pain and request assistance  - Assess pain using appropriate pain scale  - Administer analgesics based on type and severity of pain and evaluate response  - Implement non-pharmacological measures as appropriate and evaluate response  - Consider cultural and social influences on pain and pain management  - Notify physician/advanced practitioner if interventions unsuccessful or patient reports new pain  Outcome: Progressing     Problem: INFECTION - ADULT  Goal: Absence or prevention of progression during hospitalization  Description: INTERVENTIONS:  - Assess and monitor for signs and symptoms of infection  - Monitor lab/diagnostic results  - Monitor all insertion sites, i.e. indwelling lines, tubes, and drains  - Monitor endotracheal if appropriate and nasal secretions for changes in amount and color  - [place] appropriate cooling/warming therapies per order  - Administer medications as ordered  - Instruct and encourage patient and family to use good hand hygiene technique  - Identify and instruct in appropriate isolation precautions for identified infection/condition  Outcome: Progressing  Goal: Absence of fever/infection during neutropenic period  Description: INTERVENTIONS:  - Monitor WBC    Outcome: Progressing     Problem: SAFETY ADULT  Goal: Patient will remain free of falls  Description: INTERVENTIONS:  - Educate patient/family on patient safety including physical limitations  - Instruct patient to call for assistance with activity   - Consult OT/PT to assist with strengthening/mobility   - Keep Call bell within reach  - Keep bed low and locked with side rails adjusted as appropriate  - Keep care items and personal belongings within reach  - Initiate and maintain comfort rounds  - Make Fall Risk Sign visible to staff  - Offer Toileting every 2 Hours,  in advance of need  - Initiate/Maintain alarm  - Obtain necessary fall risk management equipment  - Apply yellow socks and bracelet for high fall risk patients  - Consider moving patient to room near nurses station  Outcome: Progressing  Goal: Maintain or return to baseline ADL function  Description: INTERVENTIONS:  -  Assess patient's ability to carry out ADLs; assess patient's baseline for ADL function and identify physical deficits which impact ability to perform ADLs (bathing, care of mouth/teeth, toileting, grooming, dressing, etc.)  - Assess/evaluate cause of self-care deficits   - Assess range of motion  - Assess patient's mobility; develop plan if impaired  - Assess patient's need for assistive devices and provide as appropriate  - Encourage maximum independence but intervene and supervise when necessary  - Involve family in performance of ADLs  - Assess for home care needs following discharge   - Consider OT consult to assist with ADL evaluation and planning for discharge  - Provide patient education as appropriate  Outcome: Progressing  Goal: Maintains/Returns to pre admission functional level  Description: INTERVENTIONS:  - Perform AM-PAC 6 Click Basic Mobility/ Daily Activity assessment daily.  - Set and communicate daily mobility goal to care team and patient/family/caregiver.   - Collaborate with rehabilitation services on mobility goals if consulted  - Perform Range of Motion 3 times a day.  - Reposition patient every 2 hours.  - Record patient progress and toleration of activity level   Outcome: Progressing     Problem: DISCHARGE PLANNING  Goal: Discharge to home or other facility with appropriate resources  Description: INTERVENTIONS:  - Identify barriers to discharge w/patient and caregiver  - Arrange for needed discharge resources and transportation as appropriate  - Identify discharge learning needs (meds, wound care, etc.)  - Arrange for interpretive services to assist at discharge as needed  -  Refer to Case Management Department for coordinating discharge planning if the patient needs post-hospital services based on physician/advanced practitioner order or complex needs related to functional status, cognitive ability, or social support system  Outcome: Progressing     Problem: Knowledge Deficit  Goal: Patient/family/caregiver demonstrates understanding of disease process, treatment plan, medications, and discharge instructions  Description: Complete learning assessment and assess knowledge base.  Interventions:  - Provide teaching at level of understanding  - Provide teaching via preferred learning methods  Outcome: Progressing     Problem: NEUROSENSORY - ADULT  Goal: Achieves stable or improved neurological status  Description: INTERVENTIONS  - Monitor and report changes in neurological status  - Monitor vital signs such as temperature, blood pressure, glucose, and any other labs ordered   - Initiate measures to prevent increased intracranial pressure  - Monitor for seizure activity and implement precautions if appropriate      Outcome: Progressing  Goal: Remains free of injury related to seizures activity  Description: INTERVENTIONS  - Maintain airway, patient safety  and administer oxygen as ordered  - Monitor patient for seizure activity, document and report duration and description of seizure to physician/advanced practitioner  - If seizure occurs,  ensure patient safety during seizure  - Reorient patient post seizure  - Seizure pads on all 4 side rails  - Instruct patient/family to notify RN of any seizure activity including if an aura is experienced  - Instruct patient/family to call for assistance with activity based on nursing assessment  - Administer anti-seizure medications if ordered    Outcome: Progressing  Goal: Achieves maximal functionality and self care  Description: INTERVENTIONS  - Monitor swallowing and airway patency with patient fatigue and changes in neurological status  - Encourage  and assist patient to increase activity and self care.   - Encourage visually impaired, hearing impaired and aphasic patients to use assistive/communication devices  Outcome: Progressing     Problem: CARDIOVASCULAR - ADULT  Goal: Maintains optimal cardiac output and hemodynamic stability  Description: INTERVENTIONS:  - Monitor I/O, vital signs and rhythm  - Monitor for S/S and trends of decreased cardiac output  - Administer and titrate ordered vasoactive medications to optimize hemodynamic stability  - Assess quality of pulses, skin color and temperature  - Assess for signs of decreased coronary artery perfusion  - Instruct patient to report change in severity of symptoms  Outcome: Progressing  Goal: Absence of cardiac dysrhythmias or at baseline rhythm  Description: INTERVENTIONS:  - Continuous cardiac monitoring, vital signs, obtain 12 lead EKG if ordered  - Administer antiarrhythmic and heart rate control medications as ordered  - Monitor electrolytes and administer replacement therapy as ordered  Outcome: Progressing     Problem: RESPIRATORY - ADULT  Goal: Achieves optimal ventilation and oxygenation  Description: INTERVENTIONS:  - Assess for changes in respiratory status  - Assess for changes in mentation and behavior  - Position to facilitate oxygenation and minimize respiratory effort  - Oxygen administered by appropriate delivery if ordered  - Initiate smoking cessation education as indicated  - Encourage broncho-pulmonary hygiene including cough, deep breathe, Incentive Spirometry  - Assess the need for suctioning and aspirate as needed  - Assess and instruct to report SOB or any respiratory difficulty  - Respiratory Therapy support as indicated  Outcome: Progressing     Problem: GASTROINTESTINAL - ADULT  Goal: Minimal or absence of nausea and/or vomiting  Description: INTERVENTIONS:  - Administer IV fluids if ordered to ensure adequate hydration  - Maintain NPO status until nausea and vomiting are  resolved  - Nasogastric tube if ordered  - Administer ordered antiemetic medications as needed  - Provide nonpharmacologic comfort measures as appropriate  - Advance diet as tolerated, if ordered  - Consider nutrition services referral to assist patient with adequate nutrition and appropriate food choices  Outcome: Progressing  Goal: Maintains or returns to baseline bowel function  Description: INTERVENTIONS:  - Assess bowel function  - Encourage oral fluids to ensure adequate hydration  - Administer IV fluids if ordered to ensure adequate hydration  - Administer ordered medications as needed  - Encourage mobilization and activity  - Consider nutritional services referral to assist patient with adequate nutrition and appropriate food choices  Outcome: Progressing  Goal: Maintains adequate nutritional intake  Description: INTERVENTIONS:  - Monitor percentage of each meal consumed  - Identify factors contributing to decreased intake, treat as appropriate  - Assist with meals as needed  - Monitor I&O, weight, and lab values if indicated  - Obtain nutrition services referral as needed  Outcome: Progressing  Goal: Establish and maintain optimal ostomy function  Description: INTERVENTIONS:  - Assess bowel function  - Encourage oral fluids to ensure adequate hydration  - Administer IV fluids if ordered to ensure adequate hydration   - Administer ordered medications as needed  - Encourage mobilization and activity  - Nutrition services referral to assist patient with appropriate food choices  - Assess stoma site  - Consider wound care consult   Outcome: Progressing  Goal: Oral mucous membranes remain intact  Description: INTERVENTIONS  - Assess oral mucosa and hygiene practices  - Implement preventative oral hygiene regimen  - Implement oral medicated treatments as ordered  - Initiate Nutrition services referral as needed  Outcome: Progressing     Problem: METABOLIC, FLUID AND ELECTROLYTES - ADULT  Goal: Electrolytes  maintained within normal limits  Description: INTERVENTIONS:  - Monitor labs and assess patient for signs and symptoms of electrolyte imbalances  - Administer electrolyte replacement as ordered  - Monitor response to electrolyte replacements, including repeat lab results as appropriate  - Instruct patient on fluid and nutrition as appropriate  Outcome: Progressing  Goal: Fluid balance maintained  Description: INTERVENTIONS:  - Monitor labs   - Monitor I/O and WT  - Instruct patient on fluid and nutrition as appropriate  - Assess for signs & symptoms of volume excess or deficit  Outcome: Progressing  Goal: Glucose maintained within target range  Description: INTERVENTIONS:  - Monitor Blood Glucose as ordered  - Assess for signs and symptoms of hyperglycemia and hypoglycemia  - Administer ordered medications to maintain glucose within target range  - Assess nutritional intake and initiate nutrition service referral as needed  Outcome: Progressing     Problem: SKIN/TISSUE INTEGRITY - ADULT  Goal: Skin Integrity remains intact(Skin Breakdown Prevention)  Description: Assess:  -Perform Carmelo assessment  -Clean and moisturize skin   -Inspect skin when repositioning, toileting, and assisting with ADLS  -Assess under medical devices  -Assess extremities for adequate circulation and sensation     Bed Management:  -Have minimal linens on bed & keep smooth, unwrinkled  -Change linens as needed when moist or perspiring  -Avoid sitting or lying in one position for more than 2 hours while in bed  -Keep HOB at 60 degrees     Toileting:  -Offer bedside commode  -Assess for incontinence   -Use incontinent care products after each incontinent episode     Activity:  -Mobilize patient 3 times a day  -Encourage activity and walks on unit  -Encourage or provide ROM exercises   -Turn and reposition patient every 2 Hours  -Use appropriate equipment to lift or move patient in bed  -Instruct/ Assist with weight shifting  when out of bed in  chair  -Consider limitation of chair time 2 hour intervals    Skin Care:  -Avoid use of baby powder, tape, friction and shearing, hot water or constrictive clothing  -Relieve pressure over bony prominences   -Do not massage red bony areas    Next Steps:  -Teach patient strategies to minimize risks   -Consider consults to  interdisciplinary teams  Outcome: Progressing  Goal: Incision(s), wounds(s) or drain site(s) healing without S/S of infection  Description: INTERVENTIONS  - Assess and document dressing, incision, wound bed, drain sites and surrounding tissue  - Provide patient and family education  - Perform skin care/dressing changes   Outcome: Progressing  Goal: Pressure injury heals and does not worsen  Description: Interventions:  - Implement low air loss mattress or specialty surface (Criteria met)  - Apply silicone foam dressing  - Instruct/assist with weight shifting every 120 minutes when in chair   - Limit chair time to 2 hour intervals  - Use special pressure reducing interventions  when in chair   - Apply fecal or urinary incontinence containment device   - Perform passive or active ROM   - Turn and reposition patient & offload bony prominences every 2 hours   - Utilize friction reducing device or surface for transfers   - Consider consults to  interdisciplinary teams   - Use incontinent care products after each incontinent episode  - Consider nutrition services referral as needed  Outcome: Progressing     Problem: HEMATOLOGIC - ADULT  Goal: Maintains hematologic stability  Description: INTERVENTIONS  - Assess for signs and symptoms of bleeding or hemorrhage  - Monitor labs  - Administer supportive blood products/factors as ordered and appropriate  Outcome: Progressing     Problem: MUSCULOSKELETAL - ADULT  Goal: Maintain or return mobility to safest level of function  Description: INTERVENTIONS:  - Assess patient's ability to carry out ADLs; assess patient's baseline for ADL function and identify  physical deficits which impact ability to perform ADLs (bathing, care of mouth/teeth, toileting, grooming, dressing, etc.)  - Assess/evaluate cause of self-care deficits   - Assess range of motion  - Assess patient's mobility  - Assess patient's need for assistive devices and provide as appropriate  - Encourage maximum independence but intervene and supervise when necessary  - Involve family in performance of ADLs  - Assess for home care needs following discharge   - Consider OT consult to assist with ADL evaluation and planning for discharge  - Provide patient education as appropriate  Outcome: Progressing  Goal: Maintain proper alignment of affected body part  Description: INTERVENTIONS:  - Support, maintain and protect limb and body alignment  - Provide patient/ family with appropriate education  Outcome: Progressing     Problem: Nutrition/Hydration-ADULT  Goal: Nutrient/Hydration intake appropriate for improving, restoring or maintaining nutritional needs  Description: Monitor and assess patient's nutrition/hydration status for malnutrition. Collaborate with interdisciplinary team and initiate plan and interventions as ordered.  Monitor patient's weight and dietary intake as ordered or per policy. Utilize nutrition screening tool and intervene as necessary. Determine patient's food preferences and provide high-protein, high-caloric foods as appropriate.     INTERVENTIONS:  - Monitor oral intake, urinary output, labs, and treatment plans  - Assess nutrition and hydration status and recommend course of action  - Evaluate amount of meals eaten  - Assist patient with eating if necessary   - Allow adequate time for meals  - Recommend/ encourage appropriate diets, oral nutritional supplements, and vitamin/mineral supplements  - Order, calculate, and assess calorie counts as needed  - Recommend, monitor, and adjust tube feedings and TPN/PPN based on assessed needs  - Assess need for intravenous fluids  - Provide  specific nutrition/hydration education as appropriate  - Include patient/family/caregiver in decisions related to nutrition  Outcome: Progressing     Problem: SAFETY,RESTRAINT: NV/NON-SELF DESTRUCTIVE BEHAVIOR  Goal: Remains free of harm/injury (restraint for non violent/non self-detsructive behavior)  Description: INTERVENTIONS:  - Instruct patient/family regarding restraint use   - Assess and monitor physiologic and psychological status   - Provide interventions and comfort measures to meet assessed patient needs   - Identify and implement measures to help patient regain control  - Assess readiness for release of restraint   Outcome: Progressing  Goal: Returns to optimal restraint-free functioning  Description: INTERVENTIONS:  - Assess the patient's behavior and symptoms that indicate continued need for restraint  - Identify and implement measures to help patient regain control  - Assess readiness for release of restraint   Outcome: Progressing

## 2024-11-22 NOTE — RESPIRATORY THERAPY NOTE
RT Ventilator Management Note  Adelina Soto 64 y.o. female MRN: [id number]  [facility] [id number]      Daily Screen         11/21/2024  0703 11/22/2024  0721          Patient safety screen outcome:: Failed Failed      Not Ready for Weaning due to:: Underline problem not resolved Underline problem not resolved                Physical Exam:   Assessment Type: During-treatment  General Appearance: Sedated  Respiratory Pattern: Assisted  Chest Assessment: Chest expansion symmetrical  Bilateral Breath Sounds: Coarse  Cough: Productive  Suction: Oral, ET Tube  O2 Device: vent      Resp Comments: received on documented vent settings where pt remains. ETT in cirrect postion and repostioned. Bilateral BS coarse,clear post suction. Suctioned for moderate thin clear secreions

## 2024-11-22 NOTE — RESPIRATORY THERAPY NOTE
RT Ventilator Management Note  Adelina Soto 64 y.o. female MRN: [id number]  Unit/Bed#: [facility] 01 Encounter: [id number]      Daily Screen         11/20/2024  0754 11/21/2024  0703          Patient safety screen outcome:: Failed Failed      Not Ready for Weaning due to:: -- Underline problem not resolved                Physical Exam:   Assessment Type: Assess only  General Appearance: Sedated  Respiratory Pattern: Assisted  Chest Assessment: Chest expansion symmetrical  Bilateral Breath Sounds: Diminished, Coarse (sl. coarse)  Cough: Productive  Suction: ET Tube  O2 Device: Ventilator      Resp Comments: Pt. remains on CMV/VC mode.  FiO2 increased to 50% at this time.   Will continue to monitor pt per protocol.

## 2024-11-22 NOTE — PROGRESS NOTES
Progress Note - Critical Care/ICU   Name: Adelina Soto 64 y.o. female I MRN: [id number]  [facility] Date of Admission: 11/12/2024   Date of Service: 11/22/2024 I Hospital Day: 10       Summary:  Patient is a 64 year old female with history of Afib on Warfarin, HTN, T2DM, morbid obesity, HLD, hypothyroidism, initially presented to [facility] on 11/12 after being found by her partner at home with AMS, seizure-like activity. EMS witnessed tonic-clonic seizure-like activity, treated with 6 mg of Versed. 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 Was subsequently intubated for airway protection and transferred to [facility]. Initial LP generally unremarkable apart from RBC's 300's from otherwise atraumatic tap, negative ME panel and HSV. 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     [facility], during evaluation pressors were weaned and AEDs adjusted. Underwent CT head which appeared unchanged, LP on 11/19 with fluoro unsuccessful. Off video EEG 11/20, repeat MRI showed no acute findings.  Ongoing discussion regarding goals of care, tracheostomy/feeding tube in LTAC versus comfort care, POA thus far remains treatment oriented. Tentatively planned for family meeting 11/25.    Assessment & Plan     Neuro:   Diagnosis: Status epilepticus  Initially presented with AMS, witnessed seizures  Initiral MRI c/f possible HSV encephalitis, started on acyclovir  Neurology following, vEEG initially showing left frontal seizures, burst suppressed initially, now on Keppra 1g q12h, Phenytoin 100 mg q8h, Depacon 375 mg q6h  LP showed glucose 89, protein 74, 339 RBC's, 2 WBC's, ME panel negative  ID following, recommend continuing acyclovir 10 mg/kg  every 12 hours for time being  Off vEEG 11/20  Repeat MRI 11/20 unremarkable    Diagnosis: H/o depression  Plan:  Continue home Zoloft     CV:   Diagnosis: Shock/hypotension, likely in setting of sedation  Plan:  MAP 65-70, off pressors     Diagnosis: Elevated troponin  54 on arrival, peaked at 1887 on 11/13  ECG without ST changes  Likely demand related due to RVR and/or hypotension as above  Plan:  Monitor telemetry, rate control < 110 bpm     Diagnosis: H/o Afib on Warfarin  Plan:  Lopressor 25 mg PO BID  AC with Lovenox 1 mg/kg BID     Pulm:  Diagnosis: Acute hypoxic respiratory failure  Intubated on arrival for airway protection due to AMS  Vent settings: SCMV, 10/420/8/40%  Bronch 11/12 with generalized edematous friable mucosa and moderate thick purulent secretions, culture with mixed respiratory viktoriya  Initial chest x-ray on arrival with mild edema more so on the right, left-sided whiteout likely from mucous plugging and atelectasis, given improvement after bronchoscopy, now continues to have crackles bilaterally  Repeat bronchoscopy 11/20 generally unremarkable    GI:   Diagnosis: GERD  Plan:  Continue home pepcid     :   Diagnosis: CKD  Baseline Cr 1.4-1.8  Remains baseline throughout hospital stay  Monitor renal fx, monitor I/O's, avoid nephrotoxins     F/E/N:   F - none  E - K > 4, Mg > 2, Phos > 3  N - TF with Vital 1.2 @ 55/hr     Heme/Onc:   Diagnosis: Anemia  Hgb 9-10 this admission  Stable thus far, transfuse if < 7     Endo:   Diagnosis: Hypothyroidism  TSH 0.29, T4 1.23  Continue home levothyroxine     Diagnosis: T2DM  Diet controlled, not on medications outpatient  Monitor BG for goal 140-180  Add SSI if needed     ID:   Diagnosis: Possible encephalitis  Initial MRI brain w/ c/f HSV encephalitis  LP and ME panel neg  ID following, continue Acyclovir 10 mg/kg q12h for 2 weeks total  11/22 low grade fever, tachycardic and hypotensive with MAP to 50's  Repeat infectious w/u if repeatedly elevated  temp     Diagnosis: UTI, possible pneumonia  Urine culture growing E. coli and ESBL proteus  Earlier bronchoscopy with copious purulent secretions, however culture with mixed respiratory viktoriya  Not treated earlier in admission given lack of fever, improvement in leukocytosis     MSK/Skin:   Diagnosis: Pressure wounds  Continue wound care     Disposition: Critical care    ICU Core Measures     Vented Patient  VAP Bundle  VAP bundle ordered     A: Assess, Prevent, and Manage Pain Has pain been assessed? Yes  Need for changes to pain regimen? No   B: Both Spontaneous Awakening Trials (SATs) and Spontaneous Breathing Trials (SBTs) Plan to perform spontaneous awakening trial today? Yes   Plan to perform spontaneous breathing trial today? Yes   Obvious barriers to extubation? Yes   C: Choice of Sedation RASS Goal: 0 Alert and Calm  Need for changes to sedation or analgesia regimen? No   D: Delirium CAM-ICU: Unable to perform secondary to Acute cognitive dysfunction   E: Early Mobility  Plan for early mobility? NA   F: Family Engagement Plan for family engagement today? Yes       Antibiotic Review: Patient on appropriate coverage based on culture data.     Review of Invasive Devices:    Stringer Plan: Continue for accurate I/O monitoring for 48 hours  Central access plan: Medications requiring central line      Prophylaxis:  VTE VTE covered by:  enoxaparin, Subcutaneous       Stress Ulcer  covered byfamotidine (PEPCID) 20 mg tablet [[id number]] (Long-Term Med), famotidine (PEPCID) tablet 20 mg [[id number]], pantoprazole (PROTONIX) 40 mg tablet [[id number]] (Long-Term Med)         24 Hour Events : Increased Lopressor to 25 mg BID yesterday. AISSATOU overnight. Low grade fever this morning, tachycardic to 120's and episode of hypotension with MAP of 50s. Treated with additional 500 cc bolus of IV crystalloid.    Subjective   Review of Systems: Review of Systems not obtainable due to Clinical Condition    Objective :                    Vitals I/O      Most Recent Min/Max in 24hrs   Temp 99.3 °F (37.4 °C) Temp  Min: 98.4 °F (36.9 °C)  Max: 100.6 °F (38.1 °C)   Pulse (!) 116 Pulse  Min: 106  Max: 125   Resp 18 Resp  Min: 17  Max: 27   BP (!) 79/46 BP  Min: 79/46  Max: 142/63   O2 Sat 95 % SpO2  Min: 90 %  Max: 97 %      Intake/Output Summary (Last 24 hours) at 11/22/2024 1024  Last data filed at 11/22/2024 0440  Gross per 24 hour   Intake 2559.02 ml   Output 1070 ml   Net 1489.02 ml       Diet Enteral/Parenteral; Tube Feeding No Oral Diet; Vital AF 1.2; Cyclic; 55; 22 hours    Invasive Monitoring           Physical Exam   Physical Exam  Eyes:      Comments: Pupils bilaterally reactive, sluggish   Skin:     General: Skin is warm and dry.   HENT:      Head: Normocephalic and atraumatic.      Mouth/Throat:      Mouth: No angioedema.   Cardiovascular:      Rate and Rhythm: Tachycardia present. Rhythm irregular.   Musculoskeletal:      Right lower leg: Trace Edema present.      Left lower leg: Trace Edema present.      Comments: RUE edema noted.   Abdominal: General: There is no distension.      Palpations: Abdomen is soft.      Tenderness: There is no abdominal tenderness.   Constitutional:       General: She is not in acute distress.     Interventions: She is intubated. She is not sedated.     Comments: Patient is unresponsive   Pulmonary:      Effort: She is intubated.      Breath sounds: Normal breath sounds.   Neurological:      Mental Status: She is unresponsive.        Cough reflex and gag reflex intact.      Comments: Minimal spontaneous movement of all four extremities.          Diagnostic Studies        Lab Results: I have reviewed the following results:     Medications:  Scheduled PRN   acyclovir, 10 mg/kg (Adjusted), Q12H  atorvastatin, 10 mg, Daily With Dinner  chlorhexidine, 15 mL, Q12H LINO  enoxaparin, 1 mg/kg, Q12H LINO  famotidine, 20 mg, Daily  insulin lispro, 2-12 Units, Q6H LINO  levalbuterol, 1.25 mg, TID  levETIRAcetam, 750 mg, Q12H  LINO  levothyroxine, 100 mcg, Early Morning  magnesium sulfate, 2 g, Once  metoprolol tartrate, 25 mg, Q12H LINO  EARLINE ANTIFUNGAL, , BID  sertraline, 50 mg, Daily  valproate sodium, 500 mg, Q6H LINO      acetaminophen, 1,000 mg, Q6H PRN  albuterol, 2.5 mg, Q6H PRN  LORazepam, 2 mg, Q4H PRN  ondansetron, 4 mg, Q4H PRN       Continuous            Labs:   CBC    Recent Labs     11/21/24  0443 11/22/24  0508   WBC 9.73 10.09   HGB 9.9* 9.6*   HCT 34.4* 32.1*    226     BMP    Recent Labs     11/21/24  0717 11/22/24  0508   SODIUM 144 142   K 4.1 4.0    102   CO2 33* 33*   AGAP 5 7   BUN 25 25   CREATININE 1.51* 1.45*   CALCIUM 8.7 9.1       Coags    Recent Labs     11/21/24  0332 11/21/24  0717 11/22/24  0725   INR  --  1.18  --    PTT 63*  --  59*        Additional Electrolytes  Recent Labs     11/21/24  0717 11/22/24  0508   MG 2.1 1.8*   PHOS 2.6 2.6          Blood Gas    No recent results    No recent results   LFTs  Recent Labs     11/21/24  0717   ALT 6*   AST 13   ALKPHOS 83   ALB 2.5*   TBILI 0.25       Infectious  No recent results  Glucose  Recent Labs     11/20/24  1418 11/21/24  0717 11/22/24  0508   GLUC 134 119 127        Jose Luis Duff MD  [facility]  Internal Medicine Residency PGY-2

## 2024-11-22 NOTE — PLAN OF CARE
Problem: PAIN - ADULT  Goal: Verbalizes/displays adequate comfort level or baseline comfort level  Description: Interventions:  - Encourage patient to monitor pain and request assistance  - Assess pain using appropriate pain scale  - Administer analgesics based on type and severity of pain and evaluate response  - Implement non-pharmacological measures as appropriate and evaluate response  - Consider cultural and social influences on pain and pain management  - Notify physician/advanced practitioner if interventions unsuccessful or patient reports new pain  Outcome: Progressing     Problem: INFECTION - ADULT  Goal: Absence or prevention of progression during hospitalization  Description: INTERVENTIONS:  - Assess and monitor for signs and symptoms of infection  - Monitor lab/diagnostic results  - Monitor all insertion sites, i.e. indwelling lines, tubes, and drains  - Monitor endotracheal if appropriate and nasal secretions for changes in amount and color  - [place] appropriate cooling/warming therapies per order  - Administer medications as ordered  - Instruct and encourage patient and family to use good hand hygiene technique  - Identify and instruct in appropriate isolation precautions for identified infection/condition  Outcome: Progressing  Goal: Absence of fever/infection during neutropenic period  Description: INTERVENTIONS:  - Monitor WBC    Outcome: Progressing     Problem: SAFETY ADULT  Goal: Patient will remain free of falls  Description: INTERVENTIONS:  - Educate patient/family on patient safety including physical limitations  - Instruct patient to call for assistance with activity   - Consult OT/PT to assist with strengthening/mobility   - Keep Call bell within reach  - Keep bed low and locked with side rails adjusted as appropriate  - Keep care items and personal belongings within reach  - Initiate and maintain comfort rounds  - Make Fall Risk Sign visible to staff  - Offer Toileting every 2 Hours,  in advance of need  - Initiate/Maintain bedalarm  - Obtain necessary fall risk management equipment:   - Apply yellow socks and bracelet for high fall risk patients  - Consider moving patient to room near nurses station  Outcome: Progressing  Goal: Maintain or return to baseline ADL function  Description: INTERVENTIONS:  -  Assess patient's ability to carry out ADLs; assess patient's baseline for ADL function and identify physical deficits which impact ability to perform ADLs (bathing, care of mouth/teeth, toileting, grooming, dressing, etc.)  - Assess/evaluate cause of self-care deficits   - Assess range of motion  - Assess patient's mobility; develop plan if impaired  - Assess patient's need for assistive devices and provide as appropriate  - Encourage maximum independence but intervene and supervise when necessary  - Involve family in performance of ADLs  - Assess for home care needs following discharge   - Consider OT consult to assist with ADL evaluation and planning for discharge  - Provide patient education as appropriate  Outcome: Progressing  Goal: Maintains/Returns to pre admission functional level  Description: INTERVENTIONS:  - Perform AM-PAC 6 Click Basic Mobility/ Daily Activity assessment daily.  - Set and communicate daily mobility goal to care team and patient/family/caregiver.   - Collaborate with rehabilitation services on mobility goals if consulted  - Perform Range of Motion 3 times a day.  - Reposition patient every 2 hours.  - Dangle patient 3 times a day  - Stand patient 3 times a day  - Ambulate patient 3 times a day  - Out of bed to chair 3 times a day   - Out of bed for meals 3 times a day  - Out of bed for toileting  - Record patient progress and toleration of activity level   Outcome: Progressing     Problem: DISCHARGE PLANNING  Goal: Discharge to home or other facility with appropriate resources  Description: INTERVENTIONS:  - Identify barriers to discharge w/patient and caregiver  -  Arrange for needed discharge resources and transportation as appropriate  - Identify discharge learning needs (meds, wound care, etc.)  - Arrange for interpretive services to assist at discharge as needed  - Refer to Case Management Department for coordinating discharge planning if the patient needs post-hospital services based on physician/advanced practitioner order or complex needs related to functional status, cognitive ability, or social support system  Outcome: Progressing     Problem: Knowledge Deficit  Goal: Patient/family/caregiver demonstrates understanding of disease process, treatment plan, medications, and discharge instructions  Description: Complete learning assessment and assess knowledge base.  Interventions:  - Provide teaching at level of understanding  - Provide teaching via preferred learning methods  Outcome: Not Progressing     Problem: NEUROSENSORY - ADULT  Goal: Achieves stable or improved neurological status  Description: INTERVENTIONS  - Monitor and report changes in neurological status  - Monitor vital signs such as temperature, blood pressure, glucose, and any other labs ordered   - Initiate measures to prevent increased intracranial pressure  - Monitor for seizure activity and implement precautions if appropriate      Outcome: Progressing  Goal: Remains free of injury related to seizures activity  Description: INTERVENTIONS  - Maintain airway, patient safety  and administer oxygen as ordered  - Monitor patient for seizure activity, document and report duration and description of seizure to physician/advanced practitioner  - If seizure occurs,  ensure patient safety during seizure  - Reorient patient post seizure  - Seizure pads on all 4 side rails  - Instruct patient/family to notify RN of any seizure activity including if an aura is experienced  - Instruct patient/family to call for assistance with activity based on nursing assessment  - Administer anti-seizure medications if  ordered    Outcome: Progressing  Goal: Achieves maximal functionality and self care  Description: INTERVENTIONS  - Monitor swallowing and airway patency with patient fatigue and changes in neurological status  - Encourage and assist patient to increase activity and self care.   - Encourage visually impaired, hearing impaired and aphasic patients to use assistive/communication devices  Outcome: Progressing     Problem: CARDIOVASCULAR - ADULT  Goal: Maintains optimal cardiac output and hemodynamic stability  Description: INTERVENTIONS:  - Monitor I/O, vital signs and rhythm  - Monitor for S/S and trends of decreased cardiac output  - Administer and titrate ordered vasoactive medications to optimize hemodynamic stability  - Assess quality of pulses, skin color and temperature  - Assess for signs of decreased coronary artery perfusion  - Instruct patient to report change in severity of symptoms  Outcome: Progressing  Goal: Absence of cardiac dysrhythmias or at baseline rhythm  Description: INTERVENTIONS:  - Continuous cardiac monitoring, vital signs, obtain 12 lead EKG if ordered  - Administer antiarrhythmic and heart rate control medications as ordered  - Monitor electrolytes and administer replacement therapy as ordered  Outcome: Progressing     Problem: RESPIRATORY - ADULT  Goal: Achieves optimal ventilation and oxygenation  Description: INTERVENTIONS:  - Assess for changes in respiratory status  - Assess for changes in mentation and behavior  - Position to facilitate oxygenation and minimize respiratory effort  - Oxygen administered by appropriate delivery if ordered  - Initiate smoking cessation education as indicated  - Encourage broncho-pulmonary hygiene including cough, deep breathe, Incentive Spirometry  - Assess the need for suctioning and aspirate as needed  - Assess and instruct to report SOB or any respiratory difficulty  - Respiratory Therapy support as indicated  Outcome: Progressing     Problem:  GASTROINTESTINAL - ADULT  Goal: Minimal or absence of nausea and/or vomiting  Description: INTERVENTIONS:  - Administer IV fluids if ordered to ensure adequate hydration  - Maintain NPO status until nausea and vomiting are resolved  - Nasogastric tube if ordered  - Administer ordered antiemetic medications as needed  - Provide nonpharmacologic comfort measures as appropriate  - Advance diet as tolerated, if ordered  - Consider nutrition services referral to assist patient with adequate nutrition and appropriate food choices  Outcome: Progressing  Goal: Maintains or returns to baseline bowel function  Description: INTERVENTIONS:  - Assess bowel function  - Encourage oral fluids to ensure adequate hydration  - Administer IV fluids if ordered to ensure adequate hydration  - Administer ordered medications as needed  - Encourage mobilization and activity  - Consider nutritional services referral to assist patient with adequate nutrition and appropriate food choices  Outcome: Progressing  Goal: Maintains adequate nutritional intake  Description: INTERVENTIONS:  - Monitor percentage of each meal consumed  - Identify factors contributing to decreased intake, treat as appropriate  - Assist with meals as needed  - Monitor I&O, weight, and lab values if indicated  - Obtain nutrition services referral as needed  Outcome: Progressing  Goal: Establish and maintain optimal ostomy function  Description: INTERVENTIONS:  - Assess bowel function  - Encourage oral fluids to ensure adequate hydration  - Administer IV fluids if ordered to ensure adequate hydration   - Administer ordered medications as needed  - Encourage mobilization and activity  - Nutrition services referral to assist patient with appropriate food choices  - Assess stoma site  - Consider wound care consult   Outcome: Progressing  Goal: Oral mucous membranes remain intact  Description: INTERVENTIONS  - Assess oral mucosa and hygiene practices  - Implement preventative  oral hygiene regimen  - Implement oral medicated treatments as ordered  - Initiate Nutrition services referral as needed  Outcome: Progressing     Problem: GENITOURINARY - ADULT  Goal: Maintains or returns to baseline urinary function  Description: INTERVENTIONS:  - Assess urinary function  - Encourage oral fluids to ensure adequate hydration if ordered  - Administer IV fluids as ordered to ensure adequate hydration  - Administer ordered medications as needed  - Offer frequent toileting  - Follow urinary retention protocol if ordered  Outcome: Progressing  Goal: Absence of urinary retention  Description: INTERVENTIONS:  - Assess patient’s ability to void and empty bladder  - Monitor I/O  - Bladder scan as needed  - Discuss with physician/AP medications to alleviate retention as needed  - Discuss catheterization for long term situations as appropriate  Outcome: Progressing  Goal: Urinary catheter remains patent  Description: INTERVENTIONS:  - Assess patency of urinary catheter  - If patient has a chronic peace, consider changing catheter if non-functioning  - Follow guidelines for intermittent irrigation of non-functioning urinary catheter  Outcome: Progressing     Problem: METABOLIC, FLUID AND ELECTROLYTES - ADULT  Goal: Electrolytes maintained within normal limits  Description: INTERVENTIONS:  - Monitor labs and assess patient for signs and symptoms of electrolyte imbalances  - Administer electrolyte replacement as ordered  - Monitor response to electrolyte replacements, including repeat lab results as appropriate  - Instruct patient on fluid and nutrition as appropriate  Outcome: Progressing  Goal: Fluid balance maintained  Description: INTERVENTIONS:  - Monitor labs   - Monitor I/O and WT  - Instruct patient on fluid and nutrition as appropriate  - Assess for signs & symptoms of volume excess or deficit  Outcome: Progressing  Goal: Glucose maintained within target range  Description: INTERVENTIONS:  - Monitor  Blood Glucose as ordered  - Assess for signs and symptoms of hyperglycemia and hypoglycemia  - Administer ordered medications to maintain glucose within target range  - Assess nutritional intake and initiate nutrition service referral as needed  Outcome: Progressing     Problem: SKIN/TISSUE INTEGRITY - ADULT  Goal: Skin Integrity remains intact(Skin Breakdown Prevention)  Description: Assess:  -Perform [place] assessment every shift  -Clean and moisturize skin every reposition  -Inspect skin when repositioning, toileting, and assisting with ADLS  -Assess under medical devices such   -Assess extremities for adequate circulation and sensation     Bed Management:  -Have minimal linens on bed & keep smooth, unwrinkled  -Change linens as needed when moist or perspiring  -Avoid sitting or lying in one position for more than 2 hours while in bed  -Keep HOB at 30degrees     Toileting:  -Offer bedside commode  -Assess for incontinence every turn  -Use incontinent care products after each incontinent episode such     Activity:  -Mobilize patient 3 times a day  -Encourage activity and walks on unit  -Encourage or provide ROM exercises   -Turn and reposition patient every 2 Hours  -Use appropriate equipment to lift or move patient in bed  -Instruct/ Assist with weight shifting every hour when out of bed in chair  -Consider limitation of chair time 2 hour intervals    Skin Care:  -Avoid use of baby powder, tape, friction and shearing, hot water or constrictive clothing  -Relieve pressure over bony prominences   -Do not massage red bony areas    Next Steps:  -Teach patient strategies to minimize risks   -Consider consults to  interdisciplinary teams   Outcome: Not Progressing  Goal: Incision(s), wounds(s) or drain site(s) healing without S/S of infection  Description: INTERVENTIONS  - Assess and document dressing, incision, wound bed, drain sites and surrounding tissue  - Provide patient and family education  - Perform skin  care/dressing changes every shift  Outcome: Progressing  Goal: Pressure injury heals and does not worsen  Description: Interventions:  - Implement low air loss mattress or specialty surface (Criteria met)  - Apply silicone foam dressing  - Instruct/assist with weight shifting every 60 minutes when in chair   - Limit chair time to 2 hour intervals  - Use special pressure reducing interventions such as chair cushion when in chair   - Apply fecal or urinary incontinence containment device   - Perform passive or active ROM   - Turn and reposition patient & offload bony prominences every 2 hours   - Utilize friction reducing device or surface for transfers   - Consider consults to  interdisciplinary teams   - Use incontinent care products after each incontinent episode   - Consider nutrition services referral as needed  Outcome: Progressing     Problem: HEMATOLOGIC - ADULT  Goal: Maintains hematologic stability  Description: INTERVENTIONS  - Assess for signs and symptoms of bleeding or hemorrhage  - Monitor labs  - Administer supportive blood products/factors as ordered and appropriate  Outcome: Progressing     Problem: MUSCULOSKELETAL - ADULT  Goal: Maintain or return mobility to safest level of function  Description: INTERVENTIONS:  - Assess patient's ability to carry out ADLs; assess patient's baseline for ADL function and identify physical deficits which impact ability to perform ADLs (bathing, care of mouth/teeth, toileting, grooming, dressing, etc.)  - Assess/evaluate cause of self-care deficits   - Assess range of motion  - Assess patient's mobility  - Assess patient's need for assistive devices and provide as appropriate  - Encourage maximum independence but intervene and supervise when necessary  - Involve family in performance of ADLs  - Assess for home care needs following discharge   - Consider OT consult to assist with ADL evaluation and planning for discharge  - Provide patient education as  appropriate  Outcome: Progressing     Problem: Nutrition/Hydration-ADULT  Goal: Nutrient/Hydration intake appropriate for improving, restoring or maintaining nutritional needs  Description: Monitor and assess patient's nutrition/hydration status for malnutrition. Collaborate with interdisciplinary team and initiate plan and interventions as ordered.  Monitor patient's weight and dietary intake as ordered or per policy. Utilize nutrition screening tool and intervene as necessary. Determine patient's food preferences and provide high-protein, high-caloric foods as appropriate.     INTERVENTIONS:  - Monitor oral intake, urinary output, labs, and treatment plans  - Assess nutrition and hydration status and recommend course of action  - Evaluate amount of meals eaten  - Assist patient with eating if necessary   - Allow adequate time for meals  - Recommend/ encourage appropriate diets, oral nutritional supplements, and vitamin/mineral supplements  - Order, calculate, and assess calorie counts as needed  - Recommend, monitor, and adjust tube feedings and TPN/PPN based on assessed needs  - Assess need for intravenous fluids  - Provide specific nutrition/hydration education as appropriate  - Include patient/family/caregiver in decisions related to nutrition  Outcome: Progressing     Problem: Prexisting or High Potential for Compromised Skin Integrity  Goal: Skin integrity is maintained or improved  Description: INTERVENTIONS:  - Identify patients at risk for skin breakdown  - Assess and monitor skin integrity  - Assess and monitor nutrition and hydration status  - Monitor labs   - Assess for incontinence   - Turn and reposition patient  - Assist with mobility/ambulation  - Relieve pressure over bony prominences  - Avoid friction and shearing  - Provide appropriate hygiene as needed including keeping skin clean and dry  - Evaluate need for skin moisturizer/barrier cream  - Collaborate with interdisciplinary team   -  Patient/family teaching  - Consider wound care consult   Outcome: Progressing     Problem: SAFETY,RESTRAINT: NV/NON-SELF DESTRUCTIVE BEHAVIOR  Goal: Remains free of harm/injury (restraint for non violent/non self-detsructive behavior)  Description: INTERVENTIONS:  - Instruct patient/family regarding restraint use   - Assess and monitor physiologic and psychological status   - Provide interventions and comfort measures to meet assessed patient needs   - Identify and implement measures to help patient regain control  - Assess readiness for release of restraint   Outcome: Completed  Goal: Returns to optimal restraint-free functioning  Description: INTERVENTIONS:  - Assess the patient's behavior and symptoms that indicate continued need for restraint  - Identify and implement measures to help patient regain control  - Assess readiness for release of restraint   Outcome: Completed

## 2024-11-22 NOTE — ASSESSMENT & PLAN NOTE
Patient presented with acute encephalopathy and seizure-like activity.  Video EEG concerning for status epilepticus.  MRI brain without contrast was limited by motion but showed restricted diffusion in the left temporal lobe which could be postictal versus infection.  Status post LP 11/12 and CSF studies unremarkable, not consistent with infection with 2 WBCs.  Protein mildly elevated which could be due to seizure activity itself.  ME panel negative, stand alone HSV PCR also negative.  However with new onset seizures and possible abnormality of the temporal lobe HSV remains a possibility as PCR may be negative and CSF bland and early disease.  CSF culture with no growth.  The patient has grown E. coli and ESBL Proteus in urine culture but in the absence of prior systemic signs of infection or localizing symptoms UTI, doubt this is causing status epilepticus. HIV negative. Fever and leukocytosis improved.  Mental status remains poor.  IR unable to perform patient's LP due to technical difficulty.  MRI brain without evidence of acute infarct, intracranial hemorrhage or mass              -continue IV acyclovir 10 mg/kg every 12 hours.  Will complete an empiric 14-day course since unable to perform LP, through 11/26              -Monitor CBC and CMP while on acyclovir to monitor for toxicities   -AEDs per neurology

## 2024-11-22 NOTE — CASE MANAGEMENT
Case Management Discharge Planning Note    [facility]  [facility] [id number]  : 1960 Date 2024       Current Admission Date: 2024  Current Admission Diagnosis:Seizure (HCC)   Patient Active Problem List    Diagnosis Date Noted Date Diagnosed    Abnormal EKG 11/15/2024     Elevated troponin level not due myocardial infarction 11/15/2024     Bacteriuria 11/15/2024     Shock (HCC) 11/15/2024     Acute hypoxic respiratory failure (HCC) 11/15/2024     Seizure (HCC) 2024     Diabetic nephropathy associated with type 2 diabetes mellitus (HCC) 2024     Coagulopathy (HCC) 2024     Type 2 diabetes mellitus with hyperglycemia, unspecified whether long term insulin use (HCC) 2024     Chronic anticoagulation 2023     Chronic respiratory failure with hypercapnia ([facility]) 2023     CKD (chronic kidney disease) 2023     Anemia in stage 3b chronic kidney disease  (HCC) 2023     Chronic respiratory failure with hypoxia (HCC) 2023     History of hysterectomy 2022     Panniculitis 2022     Abnormal CT scan 2022     Panniculus 2022     Bilateral pleural effusion 2022     Cardiomegaly 2022     Depression, recurrent (HCC) 2022     Pre-ulcerative corn or callous 2022     Morbid obesity with BMI of 50.0-59.9, adult (HCC) 2021     Secondary hyperparathyroidism of renal origin (HCC) 2021     GERD (gastroesophageal reflux disease) 2020     Anxiety 2020     Chronic constipation 2020     Paroxysmal atrial fibrillation (HCC) 2020     COPD with asthma (HCC) 2020     Hypothyroid 2020     Cellulitis 09/10/2020     H/O right nephrectomy 09/10/2020     Hyperparathyroidism (HCC) 2020     Dyslipidemia 2019     Hypertensive chronic kidney disease with stage 1 through stage 4 chronic kidney disease, or unspecified chronic kidney disease  10/04/2018     Persistent proteinuria 10/04/2018     Iron deficiency 10/04/2018       LOS (days): 10  Geometric Mean LOS (GMLOS) (days): 4.4  Days to GMLOS:-5.2     OBJECTIVE:  Risk of Unplanned Readmission Score: 21.4         Current admission status: Inpatient   Preferred Pharmacy:   [facility]/pharmacy #[id number] - [place] - [address]  [address] [address]  Phone: [phone] Fax: [phone]    [facility] [place], [place] - [address]  [address]  [address]  [facility] [address]  Phone: [phone] Fax: [phone]    Primary Care Provider: TULIO Beaver    Primary Insurance: [facility]  Secondary Insurance: [facility]    DISCHARGE DETAILS:       Other Referral/Resources/Interventions Provided:  Referral Comments: Per chart review: plan for GOC/family meeting Monday 11/25 to discuss trach/PEG vs. withdrawal of care. CM to follow for dcp needs pending medical course.

## 2024-11-22 NOTE — PROGRESS NOTES
Progress Note - Infectious Disease   Name: Adelina Soto 64 y.o. female I MRN: [id number]  Unit/Bed#: MICU 01 I Date of Admission: 11/12/2024   Date of Service: 11/22/2024 I Hospital Day: 10    Assessment & Plan  Seizure (HCC)   Patient presented with acute encephalopathy and seizure-like activity.  Video EEG concerning for status epilepticus.  MRI brain without contrast was limited by motion but showed restricted diffusion in the left temporal lobe which could be postictal versus infection.  Status post LP 11/12 and CSF studies unremarkable, not consistent with infection with 2 WBCs.  Protein mildly elevated which could be due to seizure activity itself.  ME panel negative, stand alone HSV PCR also negative.  However with new onset seizures and possible abnormality of the temporal lobe HSV remains a possibility as PCR may be negative and CSF bland and early disease.  CSF culture with no growth.  The patient has grown E. coli and ESBL Proteus in urine culture but in the absence of prior systemic signs of infection or localizing symptoms UTI, doubt this is causing status epilepticus. HIV negative. Fever and leukocytosis improved.  Mental status remains poor.  IR unable to perform patient's LP due to technical difficulty.  MRI brain without evidence of acute infarct, intracranial hemorrhage or mass              -continue IV acyclovir 10 mg/kg every 12 hours.  Will complete an empiric 14-day course since unable to perform LP, through 11/26              -Monitor CBC and CMP while on acyclovir to monitor for toxicities   -AEDs per neurology  Bacteriuria  UA with pyuria and urine culture growing E. coli and ESBL Proteus.  No symptoms of infection prior to admission.  Unlikely cause of status epilepticus however in absence of clear cause of seizure activity treated for possible cystitis with 3 days zosyn   -monitor off antibiotics   Shock (HCC)  May be multifactorial related to possible infection, sedating medications.   Off vasopressor support   -Hemodynamic support per critical care team   -Antimicrobials as above  Acute hypoxic respiratory failure (HCC)  Patient intubated for airway protection.  Status post bronchoscopy 11/13 for mucous plugging and thick left-sided secretions seen.  Chest x-ray not consistent with pneumonia.  BAL culture shows growth of mixed respiratory viktoriya.   -Ventilator management per critical care team  Morbid obesity with BMI of 50.0-59.9, adult (HCC)  Affects antimicrobial dosing  CKD (chronic kidney disease)  Lab Results   Component Value Date    EGFR 37 11/22/2024    EGFR 36 11/21/2024    EGFR 34 11/20/2024    CREATININE 1.45 (H) 11/22/2024    CREATININE 1.51 (H) 11/21/2024    CREATININE 1.57 (H) 11/20/2024   Baseline creatinine 1.7-1.9.  Creatinine stable, slightly below baseline.  Dose adjust antibiotics as needed for creatinine clearance.  Monitor creatinine daily  Type 2 diabetes mellitus with hyperglycemia, unspecified whether long term insulin use ([facility])  Lab Results   Component Value Date    HGBA1C 5.7 (H) 11/13/2024   Diabetes well-controlled.  Glycemic management per primary team    Above management plan to continue IV acyclovir discussed with the critical care resident.  ID will see again 11/25/2024, please call with questions.    Antibiotics:  Acyclovir day 10    Subjective   The patient remains intubated.  The patient remains minimally responsive.  No fevers.  Plan for family meeting on Monday.    Temp:  [99 °F (37.2 °C)-100.6 °F (38.1 °C)] 99.3 °F (37.4 °C)  HR:  [107-125] 107  BP: ()/(46-68) 101/55  Resp:  [17-27] 26  SpO2:  [90 %-99 %] 99 %  O2 Device: Ventilator    General: Intubated, not responsive   Mouth: ET tube in place  Neck: trachea midline   CV: RRR, no murmurs   Lungs: clear to auscultation bilaterally   Abdomen: no distension   Skin: no rashes, cellulitis.  Intertrigo in her skin folds    Lab Results: I have reviewed the following results:  Results from last 7 days   Lab  Units 11/22/24  0508 11/21/24  0443 11/20/24  0507   WBC Thousand/uL 10.09 9.73 8.35   HEMOGLOBIN g/dL 9.6* 9.9* 9.6*   PLATELETS Thousands/uL 226 189 159     Results from last 7 days   Lab Units 11/22/24  0508 11/21/24  0717 11/20/24  1418 11/18/24  0447 11/17/24  0517 11/16/24  0503   SODIUM mmol/L 142 144 141   < > 140 137   POTASSIUM mmol/L 4.0 4.1 4.3   < > 4.2 3.8   CHLORIDE mmol/L 102 106 102   < > 108 110*   CO2 mmol/L 33* 33* 33*   < > 24 20*   BUN mg/dL 25 25 26*   < > 16 15   CREATININE mg/dL 1.45* 1.51* 1.57*   < > 1.48* 1.47*   EGFR ml/min/1.73sq m 37 36 34   < > 36 37   CALCIUM mg/dL 9.1 8.7 8.6   < > 7.8* 7.3*   AST U/L  --  13  --   --  15 17   ALT U/L  --  6*  --   --  6* 6*   ALK PHOS U/L  --  83  --   --  96 92   ALBUMIN g/dL  --  2.5*  --   --  2.4* 2.3*    < > = values in this interval not displayed.                   I have personally reviewed pertinent imaging reports and images in PACS.  MRI brain without acute infarct, intracranial hemorrhage or mass

## 2024-11-23 ENCOUNTER — APPOINTMENT (INPATIENT)
Dept: RADIOLOGY | Facility: HOSPITAL | Age: 64
DRG: 097 | End: 2024-11-23
Payer: COMMERCIAL

## 2024-11-23 LAB
ANION GAP SERPL CALCULATED.3IONS-SCNC: 6 MMOL/L (ref 4–13)
BACTERIA UR QL AUTO: ABNORMAL /HPF
BACTERIA UR QL AUTO: ABNORMAL /HPF
BILIRUB UR QL STRIP: NEGATIVE
BILIRUB UR QL STRIP: NEGATIVE
BUN SERPL-MCNC: 24 MG/DL (ref 5–25)
CALCIUM SERPL-MCNC: 9 MG/DL (ref 8.4–10.2)
CHLORIDE SERPL-SCNC: 102 MMOL/L (ref 96–108)
CLARITY UR: CLEAR
CLARITY UR: CLEAR
CO2 SERPL-SCNC: 34 MMOL/L (ref 21–32)
COLOR UR: ABNORMAL
COLOR UR: ABNORMAL
CREAT SERPL-MCNC: 1.38 MG/DL (ref 0.6–1.3)
ERYTHROCYTE [DISTWIDTH] IN BLOOD BY AUTOMATED COUNT: 16.6 % (ref 11.6–15.1)
GFR SERPL CREATININE-BSD FRML MDRD: 40 ML/MIN/1.73SQ M
GLUCOSE SERPL-MCNC: 106 MG/DL (ref 65–140)
GLUCOSE SERPL-MCNC: 109 MG/DL (ref 65–140)
GLUCOSE SERPL-MCNC: 115 MG/DL (ref 65–140)
GLUCOSE SERPL-MCNC: 123 MG/DL (ref 65–140)
GLUCOSE SERPL-MCNC: 126 MG/DL (ref 65–140)
GLUCOSE UR STRIP-MCNC: NEGATIVE MG/DL
GLUCOSE UR STRIP-MCNC: NEGATIVE MG/DL
HCT VFR BLD AUTO: 29.5 % (ref 34.8–46.1)
HGB BLD-MCNC: 8.8 G/DL (ref 11.5–15.4)
HGB UR QL STRIP.AUTO: ABNORMAL
HGB UR QL STRIP.AUTO: ABNORMAL
KETONES UR STRIP-MCNC: NEGATIVE MG/DL
KETONES UR STRIP-MCNC: NEGATIVE MG/DL
LEUKOCYTE ESTERASE UR QL STRIP: NEGATIVE
LEUKOCYTE ESTERASE UR QL STRIP: NEGATIVE
MAGNESIUM SERPL-MCNC: 2.1 MG/DL (ref 1.9–2.7)
MCH RBC QN AUTO: 31.9 PG (ref 26.8–34.3)
MCHC RBC AUTO-ENTMCNC: 29.8 G/DL (ref 31.4–37.4)
MCV RBC AUTO: 107 FL (ref 82–98)
MUCOUS THREADS UR QL AUTO: ABNORMAL
MUCOUS THREADS UR QL AUTO: ABNORMAL
NITRITE UR QL STRIP: NEGATIVE
NITRITE UR QL STRIP: NEGATIVE
NON-SQ EPI CELLS URNS QL MICRO: ABNORMAL /HPF
NON-SQ EPI CELLS URNS QL MICRO: ABNORMAL /HPF
PH UR STRIP.AUTO: 5.5 [PH]
PH UR STRIP.AUTO: 6 [PH]
PHOSPHATE SERPL-MCNC: 3.1 MG/DL (ref 2.3–4.1)
PLATELET # BLD AUTO: 228 THOUSANDS/UL (ref 149–390)
PMV BLD AUTO: 9.4 FL (ref 8.9–12.7)
POTASSIUM SERPL-SCNC: 4 MMOL/L (ref 3.5–5.3)
PROT UR STRIP-MCNC: ABNORMAL MG/DL
PROT UR STRIP-MCNC: ABNORMAL MG/DL
RBC # BLD AUTO: 2.76 MILLION/UL (ref 3.81–5.12)
RBC #/AREA URNS AUTO: ABNORMAL /HPF
RBC #/AREA URNS AUTO: ABNORMAL /HPF
SODIUM SERPL-SCNC: 142 MMOL/L (ref 135–147)
SP GR UR STRIP.AUTO: 1.02 (ref 1–1.03)
SP GR UR STRIP.AUTO: 1.02 (ref 1–1.03)
UROBILINOGEN UR STRIP-ACNC: <2 MG/DL
UROBILINOGEN UR STRIP-ACNC: <2 MG/DL
WBC # BLD AUTO: 8.09 THOUSAND/UL (ref 4.31–10.16)
WBC #/AREA URNS AUTO: ABNORMAL /HPF
WBC #/AREA URNS AUTO: ABNORMAL /HPF

## 2024-11-23 PROCEDURE — 94664 DEMO&/EVAL PT USE INHALER: CPT

## 2024-11-23 PROCEDURE — 80048 BASIC METABOLIC PNL TOTAL CA: CPT

## 2024-11-23 PROCEDURE — 94669 MECHANICAL CHEST WALL OSCILL: CPT

## 2024-11-23 PROCEDURE — 83735 ASSAY OF MAGNESIUM: CPT

## 2024-11-23 PROCEDURE — 82948 REAGENT STRIP/BLOOD GLUCOSE: CPT

## 2024-11-23 PROCEDURE — 71045 X-RAY EXAM CHEST 1 VIEW: CPT

## 2024-11-23 PROCEDURE — 99291 CRITICAL CARE FIRST HOUR: CPT | Performed by: STUDENT IN AN ORGANIZED HEALTH CARE EDUCATION/TRAINING PROGRAM

## 2024-11-23 PROCEDURE — 94003 VENT MGMT INPAT SUBQ DAY: CPT

## 2024-11-23 PROCEDURE — 94760 N-INVAS EAR/PLS OXIMETRY 1: CPT

## 2024-11-23 PROCEDURE — 94640 AIRWAY INHALATION TREATMENT: CPT

## 2024-11-23 PROCEDURE — 81001 URINALYSIS AUTO W/SCOPE: CPT

## 2024-11-23 PROCEDURE — 84100 ASSAY OF PHOSPHORUS: CPT

## 2024-11-23 PROCEDURE — 85027 COMPLETE CBC AUTOMATED: CPT

## 2024-11-23 RX ADMIN — ACETAMINOPHEN 1000 MG: 10 INJECTION INTRAVENOUS at 19:51

## 2024-11-23 RX ADMIN — LEVETIRACETAM 750 MG: 750 TABLET, FILM COATED ORAL at 20:01

## 2024-11-23 RX ADMIN — LEVOTHYROXINE SODIUM 100 MCG: 100 TABLET ORAL at 05:12

## 2024-11-23 RX ADMIN — LEVALBUTEROL HYDROCHLORIDE 1.25 MG: 1.25 SOLUTION RESPIRATORY (INHALATION) at 13:11

## 2024-11-23 RX ADMIN — LEVALBUTEROL HYDROCHLORIDE 1.25 MG: 1.25 SOLUTION RESPIRATORY (INHALATION) at 19:05

## 2024-11-23 RX ADMIN — SERTRALINE HYDROCHLORIDE 50 MG: 50 TABLET ORAL at 10:13

## 2024-11-23 RX ADMIN — VALPROATE SODIUM 500 MG: 100 INJECTION, SOLUTION INTRAVENOUS at 04:58

## 2024-11-23 RX ADMIN — MICONAZOLE NITRATE: 20 CREAM TOPICAL at 11:08

## 2024-11-23 RX ADMIN — ATORVASTATIN CALCIUM 10 MG: 10 TABLET, FILM COATED ORAL at 17:24

## 2024-11-23 RX ADMIN — ENOXAPARIN SODIUM 120 MG: 120 INJECTION SUBCUTANEOUS at 10:12

## 2024-11-23 RX ADMIN — LEVETIRACETAM 750 MG: 750 TABLET, FILM COATED ORAL at 10:11

## 2024-11-23 RX ADMIN — METOPROLOL TARTRATE 25 MG: 25 TABLET, FILM COATED ORAL at 10:11

## 2024-11-23 RX ADMIN — FAMOTIDINE 20 MG: 20 TABLET, FILM COATED ORAL at 10:11

## 2024-11-23 RX ADMIN — MICONAZOLE NITRATE: 20 CREAM TOPICAL at 18:59

## 2024-11-23 RX ADMIN — ACYCLOVIR SODIUM 775 MG: 50 INJECTION, SOLUTION INTRAVENOUS at 10:11

## 2024-11-23 RX ADMIN — VALPROATE SODIUM 500 MG: 100 INJECTION, SOLUTION INTRAVENOUS at 17:24

## 2024-11-23 RX ADMIN — LEVALBUTEROL HYDROCHLORIDE 1.25 MG: 1.25 SOLUTION RESPIRATORY (INHALATION) at 07:12

## 2024-11-23 RX ADMIN — ENOXAPARIN SODIUM 120 MG: 120 INJECTION SUBCUTANEOUS at 20:01

## 2024-11-23 RX ADMIN — METOPROLOL TARTRATE 25 MG: 25 TABLET, FILM COATED ORAL at 20:01

## 2024-11-23 RX ADMIN — CHLORHEXIDINE GLUCONATE 0.12% ORAL RINSE 15 ML: 1.2 LIQUID ORAL at 20:01

## 2024-11-23 RX ADMIN — VALPROATE SODIUM 500 MG: 100 INJECTION, SOLUTION INTRAVENOUS at 23:30

## 2024-11-23 RX ADMIN — VALPROATE SODIUM 500 MG: 100 INJECTION, SOLUTION INTRAVENOUS at 11:33

## 2024-11-23 RX ADMIN — ACYCLOVIR SODIUM 775 MG: 50 INJECTION, SOLUTION INTRAVENOUS at 22:00

## 2024-11-23 RX ADMIN — CHLORHEXIDINE GLUCONATE 0.12% ORAL RINSE 15 ML: 1.2 LIQUID ORAL at 10:12

## 2024-11-23 NOTE — PLAN OF CARE
Problem: SAFETY ADULT  Goal: Patient will remain free of falls  Description: INTERVENTIONS:  - Educate patient/family on patient safety including physical limitations  - Instruct patient to call for assistance with activity   - Consult OT/PT to assist with strengthening/mobility   - Keep Call bell within reach  - Keep bed low and locked with side rails adjusted as appropriate  - Keep care items and personal belongings within reach  - Initiate and maintain comfort rounds  - Make Fall Risk Sign visible to staff  - Apply yellow socks and bracelet for high fall risk patients  - Consider moving patient to room near nurses station  Outcome: Progressing  Goal: Maintain or return to baseline ADL function  Description: INTERVENTIONS:  -  Assess patient's ability to carry out ADLs; assess patient's baseline for ADL function and identify physical deficits which impact ability to perform ADLs (bathing, care of mouth/teeth, toileting, grooming, dressing, etc.)  - Assess/evaluate cause of self-care deficits   - Assess range of motion  - Assess patient's mobility; develop plan if impaired  - Assess patient's need for assistive devices and provide as appropriate  - Encourage maximum independence but intervene and supervise when necessary  - Involve family in performance of ADLs  - Assess for home care needs following discharge   - Consider OT consult to assist with ADL evaluation and planning for discharge  - Provide patient education as appropriate  Outcome: Progressing  Goal: Maintains/Returns to pre admission functional level  Description: INTERVENTIONS:  - Perform AM-PAC 6 Click Basic Mobility/ Daily Activity assessment daily.  - Set and communicate daily mobility goal to care team and patient/family/caregiver.   - Collaborate with rehabilitation services on mobility goals if consulted  - Out of bed for toileting  - Record patient progress and toleration of activity level   Outcome: Progressing     Problem: RESPIRATORY -  ADULT  Goal: Achieves optimal ventilation and oxygenation  Description: INTERVENTIONS:  - Assess for changes in respiratory status  - Assess for changes in mentation and behavior  - Position to facilitate oxygenation and minimize respiratory effort  - Oxygen administered by appropriate delivery if ordered  - Initiate smoking cessation education as indicated  - Encourage broncho-pulmonary hygiene including cough, deep breathe, Incentive Spirometry  - Assess the need for suctioning and aspirate as needed  - Assess and instruct to report SOB or any respiratory difficulty  - Respiratory Therapy support as indicated  Outcome: Progressing     Problem: Nutrition/Hydration-ADULT  Goal: Nutrient/Hydration intake appropriate for improving, restoring or maintaining nutritional needs  Description: Monitor and assess patient's nutrition/hydration status for malnutrition. Collaborate with interdisciplinary team and initiate plan and interventions as ordered.  Monitor patient's weight and dietary intake as ordered or per policy. Utilize nutrition screening tool and intervene as necessary. Determine patient's food preferences and provide high-protein, high-caloric foods as appropriate.     INTERVENTIONS:  - Monitor oral intake, urinary output, labs, and treatment plans  - Assess nutrition and hydration status and recommend course of action  - Evaluate amount of meals eaten  - Assist patient with eating if necessary   - Allow adequate time for meals  - Recommend/ encourage appropriate diets, oral nutritional supplements, and vitamin/mineral supplements  - Order, calculate, and assess calorie counts as needed  - Recommend, monitor, and adjust tube feedings and TPN/PPN based on assessed needs  - Assess need for intravenous fluids  - Provide specific nutrition/hydration education as appropriate  - Include patient/family/caregiver in decisions related to nutrition  Outcome: Progressing

## 2024-11-23 NOTE — RESPIRATORY THERAPY NOTE
RT Ventilator Management Note  Adelina Soto 64 y.o. female MRN: [id number]  [facility] En[facility]: [id number]      Daily Screen         11/21/2024  0703 11/22/2024  0721          Patient safety screen outcome:: Failed Failed      Not Ready for Weaning due to:: Underline problem not resolved Underline problem not resolved                Physical Exam:   Assessment Type: (P) Assess only  General Appearance: (P) Sedated  Respiratory Pattern: (P) Assisted  Chest Assessment: (P) Chest expansion symmetrical  Bilateral Breath Sounds: (P) Coarse, Diminished  O2 Device: (P) vent      Resp Comments: (P) Pt is stable at this time on current CMV settings. No changes made overnight. Will cont to monitorp t per protocol

## 2024-11-23 NOTE — RESPIRATORY THERAPY NOTE
RT Ventilator Management Note  Adelina Soto 64 y.o. female MRN: [id number]  [facility] [id number]      Daily Screen         11/22/2024 0721 11/23/2024  0712          Patient safety screen outcome:: Failed Failed      Not Ready for Weaning due to:: Underline problem not resolved Underline problem not resolved                Physical Exam:   Assessment Type: During-treatment  General Appearance: Sedated  Respiratory Pattern: Assisted  Chest Assessment: Chest expansion symmetrical  Bilateral Breath Sounds: Coarse  Suction: Oral, ET Tube  O2 Device: vent      Resp Comments: (P) Received on documented vent settings. ETT in correct poastion and repostioned. Bilateral BS coarse,clear post stuction. Treatments given as sceduled. Suctyioned for moderate amt clear secretions

## 2024-11-23 NOTE — PROGRESS NOTES
Progress Note - Critical Care/ICU   Name: Adelina Soto 64 y.o. female I MRN: [id number]  [facility] Date of [facility]: 11[facility]12[facility]   Date of Service: 11/23/2024 I Hospital Day: 11       Summary:  Patient is a 64 year old female with history of Afib on Warfarin, HTN, T2DM, morbid obesity, HLD, hypothyroidism, initially presented to [facility] on 11/12 after being found by her partner at home with AMS, seizure-like activity. EMS witnessed tonic-clonic seizure-like activity, treated with 6 mg of Versed. 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 Was subsequently intubated for airway protection and transferred to [facility]. Initial LP generally unremarkable apart from RBC's 300's from otherwise atraumatic tap, negative ME panel and HSV. 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      [facility] during evaluation pressors were weaned and AEDs adjusted. Underwent CT head which appeared unchanged, LP on 11/19 with fluoro unsuccessful. Off video EEG 11/20, repeat MRI showed no acute findings.  Ongoing discussion regarding goals of care, tracheostomy/feeding tube in LTAC versus comfort care, POA thus far remains treatment oriented. Tentatively planned for family meeting 11/25.    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, started on acyclovir  Neurology following, vEEG initially showing left frontal seizures, burst suppressed initially, now on Keppra 1g q12h, Phenytoin 100 mg q8h, Depacon 375 mg q6h  LP showed glucose 89, protein 74, 339 RBC's, 2 WBC's, ME panel negative  ID following, recommend continuing acyclovir 10 mg/kg  every 12 hours for time being  Off vEEG 11/20  Repeat MRI 11/20 unremarkable     Diagnosis: H/o depression  Plan:  Continue home Zoloft     CV:   Diagnosis: Shock/hypotension, likely in setting of sedation  Plan:  MAP 65-70, off pressors     Diagnosis: Elevated troponin  54 on arrival, peaked at 1887 on 11/13  ECG without ST changes  Likely demand related due to RVR and/or hypotension as above  Plan:  Monitor telemetry, rate control < 110 bpm     Diagnosis: H/o Afib on Warfarin  Plan:  Lopressor 25 mg PO BID  AC with Lovenox 1 mg/kg BID     Pulm:  Diagnosis: Acute hypoxic respiratory failure  Intubated on arrival for airway protection due to AMS  Vent settings: SCMV, 10/420/8/40%  Bronch 11/12 with generalized edematous friable mucosa and moderate thick purulent secretions, culture with mixed respiratory viktoriya  Initial chest x-ray on arrival with mild edema more so on the right, left-sided whiteout likely from mucous plugging and atelectasis, given improvement after bronchoscopy, now continues to have crackles bilaterally  Repeat bronchoscopy 11/20 generally unremarkable     GI:   Diagnosis: GERD  Plan:  Continue home pepcid     :   Diagnosis: CKD  Baseline Cr 1.4-1.8  Remains baseline throughout hospital stay  Monitor renal fx, monitor I/O's, avoid nephrotoxins     F/E/N:   F - none  E - K > 4, Mg > 2, Phos > 3  N - TF with Vital 1.2 @ 55/hr     Heme/Onc:   Diagnosis: Anemia  Hgb 9-10 this admission  Stable thus far, transfuse if < 7     Endo:   Diagnosis: Hypothyroidism  TSH 0.29, T4 1.23  Continue home levothyroxine     Diagnosis: T2DM  Diet controlled, not on medications outpatient  Monitor BG for goal 140-180  Add SSI if needed     ID:   Diagnosis: Possible encephalitis  Initial MRI brain w/ c/f HSV encephalitis  LP and ME panel neg  ID following, continue Acyclovir 10 mg/kg q12h for 2 weeks total  11/22 low grade fever, tachycardic and hypotensive with MAP to 50's  Repeat infectious w/u if repeatedly  elevated temp     Diagnosis: UTI, possible pneumonia  Urine culture growing E. coli and ESBL proteus  Earlier bronchoscopy with copious purulent secretions, however culture with mixed respiratory viktoriya  Not treated earlier in admission given lack of fever, improvement in leukocytosis     MSK/Skin:   Diagnosis: Pressure wounds  Continue wound care    Disposition: Critical care    ICU Core Measures     Vented Patient  VAP Bundle  VAP bundle ordered     A: Assess, Prevent, and Manage Pain Has pain been assessed? Yes  Need for changes to pain regimen? No   B: Both Spontaneous Awakening Trials (SATs) and Spontaneous Breathing Trials (SBTs) Plan to perform spontaneous awakening trial today? Yes   Plan to perform spontaneous breathing trial today? Yes   Obvious barriers to extubation? Yes   C: Choice of Sedation RASS Goal: 0 Alert and Calm  Need for changes to sedation or analgesia regimen? No   D: Delirium CAM-ICU: Unable to perform secondary to Acute cognitive dysfunction   E: Early Mobility  Plan for early mobility? NA   F: Family Engagement Plan for family engagement today? Yes       Antibiotic Review: Patient on appropriate coverage based on culture data.     Review of Invasive Devices:    Stringer Plan: Continue for accurate I/O monitoring for 48 hours        Prophylaxis:  VTE VTE covered by:  enoxaparin, Subcutaneous, 120 mg at 11/22/24 2152       Stress Ulcer  covered byfamotidine (PEPCID) 20 mg tablet [[id number]] (Long-Term Med), famotidine (PEPCID) tablet 20 mg [[id number]], pantoprazole (PROTONIX) 40 mg tablet [[id number]] (Long-Term Med)         24 Hour Events : No concerns overnight. The patient remains intubated off sedation. Still tentative plan of family meeting on 11/25. No other changes at this time.  Subjective   Review of Systems: Review of Systems not obtainable due to Clinical Condition, Altered mental status    Objective :                   Vitals I/O      Most Recent Min/Max in 24hrs   Temp 98.2 °F (36.8  °C) Temp  Min: 98.2 °F (36.8 °C)  Max: 100.2 °F (37.9 °C)   Pulse (!) 110 Pulse  Min: 97  Max: 121   Resp (!) 24 Resp  Min: 17  Max: 26   /55 BP  Min: 79/46  Max: 120/59   O2 Sat 98 % SpO2  Min: 92 %  Max: 99 %      Intake/Output Summary (Last 24 hours) at 11/23/2024 0628  Last data filed at 11/23/2024 0458  Gross per 24 hour   Intake 1319 ml   Output 2350 ml   Net -1031 ml       Diet Enteral/Parenteral; Tube Feeding No Oral Diet; Vital AF 1.2; Cyclic; 55; 22 hours    Invasive Monitoring           Physical Exam   Physical Exam  Vitals and nursing note reviewed.   Eyes:      Comments: Pupils were equal, sluggish but responsive to light   HENT:      Head: Normocephalic and atraumatic.   Cardiovascular:      Rate and Rhythm: Regular rhythm. Tachycardia present.   Abdominal:      Palpations: Abdomen is soft.      Tenderness: There is no abdominal tenderness.   Constitutional:       Appearance: She is ill-appearing.      Interventions: She is intubated and restrained. She is not sedated.  Pulmonary:      Effort: Pulmonary effort is normal. She is intubated.      Breath sounds: Rales present.   Neurological:      GCS: GCS eye subscore is 2. GCS verbal subscore is 1. GCS motor subscore is 1.      Comments: The patient opened her eyes to sternal rub, but did not follow commands or otherwise move at all.   Genitourinary/Anorectal:  Stringer present.        Diagnostic Studies        Lab Results: I have reviewed the following results:     Medications:  Scheduled PRN   acyclovir, 10 mg/kg (Adjusted), Q12H  atorvastatin, 10 mg, Daily With Dinner  chlorhexidine, 15 mL, Q12H LINO  enoxaparin, 1 mg/kg, Q12H LINO  famotidine, 20 mg, Daily  insulin lispro, 2-12 Units, Q6H LINO  levalbuterol, 1.25 mg, TID  levETIRAcetam, 750 mg, Q12H LINO  levothyroxine, 100 mcg, Early Morning  metoprolol tartrate, 25 mg, Q12H LINO  EARLINE ANTIFUNGAL, , BID  sertraline, 50 mg, Daily  valproate sodium, 500 mg, Q6H LINO      acetaminophen, 1,000 mg, Q6H  PRN  albuterol, 2.5 mg, Q6H PRN  LORazepam, 2 mg, Q4H PRN  ondansetron, 4 mg, Q4H PRN       Continuous          Labs:   CBC    Recent Labs     11/22/24  0508 11/23/24  0544   WBC 10.09 8.09   HGB 9.6* 8.8*   HCT 32.1* 29.5*    228     BMP    Recent Labs     11/22/24  0508 11/23/24  0501   SODIUM 142 142   K 4.0 4.0    102   CO2 33* 34*   AGAP 7 6   BUN 25 24   CREATININE 1.45* 1.38*   CALCIUM 9.1 9.0       Coags    Recent Labs     11/21/24  0717 11/22/24  0725   INR 1.18  --    PTT  --  59*        Additional Electrolytes  Recent Labs     11/22/24  0508 11/23/24  0501 11/23/24  0542   MG 1.8*  --  2.1   PHOS 2.6 3.1  --           Blood Gas    No recent results  No recent results LFTs  Recent Labs     11/21/24  0717   ALT 6*   AST 13   ALKPHOS 83   ALB 2.5*   TBILI 0.25       Infectious  No recent results  Glucose  Recent Labs     11/21/24  0717 11/22/24  0508 11/23/24  0501   GLUC 119 127 126

## 2024-11-23 NOTE — RESPIRATORY THERAPY NOTE
11/23/24 1311   Inhalation Therapy Tx   $ Inhalation Therapy Performed Yes   $ Pulse Oximetry Spot Check Charge Completed   SpO2 95 %   Pre-Treatment Pulse 118   Pre-Treatment Respirations 17   Duration 10   Breath Sounds Pre-Treatment Bilateral Coarse   Breath Sounds Post-Treatment Bilateral Clear   Post-Treatment Pulse 101   Post-Treatment Respirations 20   Delivery Source Aerogen;Vent   Position Semi Isabel's   Treatment Tolerance Tolerated well   Resp Comments suctioned for copiopus clear secretions. Tube hernandez changed as pt with lg sore on left cheek. [facility] Communciated findings to RN

## 2024-11-24 ENCOUNTER — APPOINTMENT (INPATIENT)
Dept: NON INVASIVE DIAGNOSTICS | Facility: HOSPITAL | Age: 64
DRG: 097 | End: 2024-11-24
Payer: COMMERCIAL

## 2024-11-24 LAB
ANION GAP SERPL CALCULATED.3IONS-SCNC: 5 MMOL/L (ref 4–13)
BUN SERPL-MCNC: 26 MG/DL (ref 5–25)
CALCIUM SERPL-MCNC: 9 MG/DL (ref 8.4–10.2)
CHLORIDE SERPL-SCNC: 102 MMOL/L (ref 96–108)
CO2 SERPL-SCNC: 34 MMOL/L (ref 21–32)
CREAT SERPL-MCNC: 1.31 MG/DL (ref 0.6–1.3)
ERYTHROCYTE [DISTWIDTH] IN BLOOD BY AUTOMATED COUNT: 18 % (ref 11.6–15.1)
FOLATE SERPL-MCNC: 17 NG/ML
GFR SERPL CREATININE-BSD FRML MDRD: 42 ML/MIN/1.73SQ M
GLUCOSE SERPL-MCNC: 104 MG/DL (ref 65–140)
GLUCOSE SERPL-MCNC: 112 MG/DL (ref 65–140)
GLUCOSE SERPL-MCNC: 113 MG/DL (ref 65–140)
GLUCOSE SERPL-MCNC: 119 MG/DL (ref 65–140)
GLUCOSE SERPL-MCNC: 94 MG/DL (ref 65–140)
HCT VFR BLD AUTO: 28.7 % (ref 34.8–46.1)
HGB BLD-MCNC: 8.4 G/DL (ref 11.5–15.4)
MAGNESIUM SERPL-MCNC: 2 MG/DL (ref 1.9–2.7)
MCH RBC QN AUTO: 31.3 PG (ref 26.8–34.3)
MCHC RBC AUTO-ENTMCNC: 29.3 G/DL (ref 31.4–37.4)
MCV RBC AUTO: 107 FL (ref 82–98)
PHOSPHATE SERPL-MCNC: 3.7 MG/DL (ref 2.3–4.1)
PLATELET # BLD AUTO: 247 THOUSANDS/UL (ref 149–390)
PMV BLD AUTO: 9.4 FL (ref 8.9–12.7)
POTASSIUM SERPL-SCNC: 5.6 MMOL/L (ref 3.5–5.3)
RBC # BLD AUTO: 2.68 MILLION/UL (ref 3.81–5.12)
SODIUM SERPL-SCNC: 141 MMOL/L (ref 135–147)
VIT B12 SERPL-MCNC: 555 PG/ML (ref 180–914)
WBC # BLD AUTO: 8.87 THOUSAND/UL (ref 4.31–10.16)

## 2024-11-24 PROCEDURE — NC001 PR NO CHARGE: Performed by: PSYCHIATRY & NEUROLOGY

## 2024-11-24 PROCEDURE — 94760 N-INVAS EAR/PLS OXIMETRY 1: CPT

## 2024-11-24 PROCEDURE — 87040 BLOOD CULTURE FOR BACTERIA: CPT | Performed by: PSYCHIATRY & NEUROLOGY

## 2024-11-24 PROCEDURE — 87154 CUL TYP ID BLD PTHGN 6+ TRGT: CPT | Performed by: PSYCHIATRY & NEUROLOGY

## 2024-11-24 PROCEDURE — 84100 ASSAY OF PHOSPHORUS: CPT | Performed by: STUDENT IN AN ORGANIZED HEALTH CARE EDUCATION/TRAINING PROGRAM

## 2024-11-24 PROCEDURE — 83735 ASSAY OF MAGNESIUM: CPT | Performed by: STUDENT IN AN ORGANIZED HEALTH CARE EDUCATION/TRAINING PROGRAM

## 2024-11-24 PROCEDURE — 80048 BASIC METABOLIC PNL TOTAL CA: CPT | Performed by: STUDENT IN AN ORGANIZED HEALTH CARE EDUCATION/TRAINING PROGRAM

## 2024-11-24 PROCEDURE — 99291 CRITICAL CARE FIRST HOUR: CPT | Performed by: STUDENT IN AN ORGANIZED HEALTH CARE EDUCATION/TRAINING PROGRAM

## 2024-11-24 PROCEDURE — 94003 VENT MGMT INPAT SUBQ DAY: CPT

## 2024-11-24 PROCEDURE — 93971 EXTREMITY STUDY: CPT

## 2024-11-24 PROCEDURE — 93005 ELECTROCARDIOGRAM TRACING: CPT

## 2024-11-24 PROCEDURE — 94669 MECHANICAL CHEST WALL OSCILL: CPT

## 2024-11-24 PROCEDURE — 82948 REAGENT STRIP/BLOOD GLUCOSE: CPT

## 2024-11-24 PROCEDURE — 85027 COMPLETE CBC AUTOMATED: CPT | Performed by: STUDENT IN AN ORGANIZED HEALTH CARE EDUCATION/TRAINING PROGRAM

## 2024-11-24 PROCEDURE — 94640 AIRWAY INHALATION TREATMENT: CPT

## 2024-11-24 PROCEDURE — 94664 DEMO&/EVAL PT USE INHALER: CPT

## 2024-11-24 PROCEDURE — 82607 VITAMIN B-12: CPT | Performed by: STUDENT IN AN ORGANIZED HEALTH CARE EDUCATION/TRAINING PROGRAM

## 2024-11-24 PROCEDURE — 82746 ASSAY OF FOLIC ACID SERUM: CPT | Performed by: STUDENT IN AN ORGANIZED HEALTH CARE EDUCATION/TRAINING PROGRAM

## 2024-11-24 RX ORDER — LANOLIN ALCOHOL/MO/W.PET/CERES
100 CREAM (GRAM) TOPICAL DAILY
Status: DISCONTINUED | OUTPATIENT
Start: 2024-11-24 | End: 2024-12-04

## 2024-11-24 RX ORDER — METOPROLOL TARTRATE 50 MG
50 TABLET ORAL EVERY 12 HOURS SCHEDULED
Status: DISCONTINUED | OUTPATIENT
Start: 2024-11-25 | End: 2024-11-26

## 2024-11-24 RX ORDER — CALCIUM GLUCONATE 20 MG/ML
2 INJECTION, SOLUTION INTRAVENOUS ONCE
Status: DISCONTINUED | OUTPATIENT
Start: 2024-11-24 | End: 2024-11-24

## 2024-11-24 RX ORDER — SODIUM CHLORIDE, SODIUM GLUCONATE, SODIUM ACETATE, POTASSIUM CHLORIDE, MAGNESIUM CHLORIDE, SODIUM PHOSPHATE, DIBASIC, AND POTASSIUM PHOSPHATE .53; .5; .37; .037; .03; .012; .00082 G/100ML; G/100ML; G/100ML; G/100ML; G/100ML; G/100ML; G/100ML
500 INJECTION, SOLUTION INTRAVENOUS ONCE
Status: COMPLETED | OUTPATIENT
Start: 2024-11-24 | End: 2024-11-24

## 2024-11-24 RX ORDER — SODIUM CHLORIDE, SODIUM GLUCONATE, SODIUM ACETATE, POTASSIUM CHLORIDE, MAGNESIUM CHLORIDE, SODIUM PHOSPHATE, DIBASIC, AND POTASSIUM PHOSPHATE .53; .5; .37; .037; .03; .012; .00082 G/100ML; G/100ML; G/100ML; G/100ML; G/100ML; G/100ML; G/100ML
500 INJECTION, SOLUTION INTRAVENOUS ONCE
Status: COMPLETED | OUTPATIENT
Start: 2024-11-24 | End: 2024-11-25

## 2024-11-24 RX ORDER — METOPROLOL TARTRATE 50 MG
50 TABLET ORAL EVERY 12 HOURS SCHEDULED
Status: DISCONTINUED | OUTPATIENT
Start: 2024-11-24 | End: 2024-11-24

## 2024-11-24 RX ADMIN — CHLORHEXIDINE GLUCONATE 0.12% ORAL RINSE 15 ML: 1.2 LIQUID ORAL at 20:43

## 2024-11-24 RX ADMIN — VALPROATE SODIUM 500 MG: 100 INJECTION, SOLUTION INTRAVENOUS at 04:05

## 2024-11-24 RX ADMIN — SODIUM CHLORIDE, SODIUM GLUCONATE, SODIUM ACETATE, POTASSIUM CHLORIDE, MAGNESIUM CHLORIDE, SODIUM PHOSPHATE, DIBASIC, AND POTASSIUM PHOSPHATE 500 ML: .53; .5; .37; .037; .03; .012; .00082 INJECTION, SOLUTION INTRAVENOUS at 21:51

## 2024-11-24 RX ADMIN — METOPROLOL TARTRATE 25 MG: 25 TABLET, FILM COATED ORAL at 09:24

## 2024-11-24 RX ADMIN — ATORVASTATIN CALCIUM 10 MG: 10 TABLET, FILM COATED ORAL at 17:13

## 2024-11-24 RX ADMIN — SODIUM CHLORIDE, SODIUM GLUCONATE, SODIUM ACETATE, POTASSIUM CHLORIDE, MAGNESIUM CHLORIDE, SODIUM PHOSPHATE, DIBASIC, AND POTASSIUM PHOSPHATE 500 ML: .53; .5; .37; .037; .03; .012; .00082 INJECTION, SOLUTION INTRAVENOUS at 22:53

## 2024-11-24 RX ADMIN — CEFTRIAXONE SODIUM 1000 MG: 10 INJECTION, POWDER, FOR SOLUTION INTRAVENOUS at 05:30

## 2024-11-24 RX ADMIN — MICONAZOLE NITRATE: 20 CREAM TOPICAL at 08:35

## 2024-11-24 RX ADMIN — ENOXAPARIN SODIUM 120 MG: 120 INJECTION SUBCUTANEOUS at 20:43

## 2024-11-24 RX ADMIN — LEVOTHYROXINE SODIUM 100 MCG: 100 TABLET ORAL at 06:03

## 2024-11-24 RX ADMIN — MICONAZOLE NITRATE: 20 CREAM TOPICAL at 17:35

## 2024-11-24 RX ADMIN — LEVALBUTEROL HYDROCHLORIDE 1.25 MG: 1.25 SOLUTION RESPIRATORY (INHALATION) at 19:18

## 2024-11-24 RX ADMIN — VALPROATE SODIUM 500 MG: 100 INJECTION, SOLUTION INTRAVENOUS at 10:38

## 2024-11-24 RX ADMIN — THIAMINE HCL TAB 100 MG 100 MG: 100 TAB at 08:42

## 2024-11-24 RX ADMIN — CHLORHEXIDINE GLUCONATE 0.12% ORAL RINSE 15 ML: 1.2 LIQUID ORAL at 08:41

## 2024-11-24 RX ADMIN — LEVETIRACETAM 750 MG: 750 TABLET, FILM COATED ORAL at 08:42

## 2024-11-24 RX ADMIN — LEVALBUTEROL HYDROCHLORIDE 1.25 MG: 1.25 SOLUTION RESPIRATORY (INHALATION) at 14:19

## 2024-11-24 RX ADMIN — ACYCLOVIR SODIUM 775 MG: 50 INJECTION, SOLUTION INTRAVENOUS at 09:24

## 2024-11-24 RX ADMIN — LEVALBUTEROL HYDROCHLORIDE 1.25 MG: 1.25 SOLUTION RESPIRATORY (INHALATION) at 07:56

## 2024-11-24 RX ADMIN — ACYCLOVIR SODIUM 775 MG: 50 INJECTION, SOLUTION INTRAVENOUS at 20:42

## 2024-11-24 RX ADMIN — LEVETIRACETAM 750 MG: 750 TABLET, FILM COATED ORAL at 20:42

## 2024-11-24 RX ADMIN — ENOXAPARIN SODIUM 120 MG: 120 INJECTION SUBCUTANEOUS at 09:24

## 2024-11-24 RX ADMIN — SODIUM CHLORIDE, SODIUM GLUCONATE, SODIUM ACETATE, POTASSIUM CHLORIDE, MAGNESIUM CHLORIDE, SODIUM PHOSPHATE, DIBASIC, AND POTASSIUM PHOSPHATE 500 ML: .53; .5; .37; .037; .03; .012; .00082 INJECTION, SOLUTION INTRAVENOUS at 09:30

## 2024-11-24 RX ADMIN — SERTRALINE HYDROCHLORIDE 50 MG: 50 TABLET ORAL at 09:24

## 2024-11-24 RX ADMIN — VALPROATE SODIUM 500 MG: 100 INJECTION, SOLUTION INTRAVENOUS at 21:30

## 2024-11-24 RX ADMIN — VALPROATE SODIUM 500 MG: 100 INJECTION, SOLUTION INTRAVENOUS at 17:13

## 2024-11-24 RX ADMIN — FAMOTIDINE 20 MG: 20 TABLET, FILM COATED ORAL at 08:42

## 2024-11-24 NOTE — PLAN OF CARE
Problem: PAIN - ADULT  Goal: Verbalizes/displays adequate comfort level or baseline comfort level  Description: Interventions:  - Encourage patient to monitor pain and request assistance  - Assess pain using appropriate pain scale  - Administer analgesics based on type and severity of pain and evaluate response  - Implement non-pharmacological measures as appropriate and evaluate response  - Consider cultural and social influences on pain and pain management  - Notify physician/advanced practitioner if interventions unsuccessful or patient reports new pain  Outcome: Progressing     Problem: INFECTION - ADULT  Goal: Absence or prevention of progression during hospitalization  Description: INTERVENTIONS:  - Assess and monitor for signs and symptoms of infection  - Monitor lab/diagnostic results  - Monitor all insertion sites, i.e. indwelling lines, tubes, and drains  - Monitor endotracheal if appropriate and nasal secretions for changes in amount and color  - [place] appropriate cooling/warming therapies per order  - Administer medications as ordered  - Instruct and encourage patient and family to use good hand hygiene technique  - Identify and instruct in appropriate isolation precautions for identified infection/condition  Outcome: Progressing  Goal: Absence of fever/infection during neutropenic period  Description: INTERVENTIONS:  - Monitor WBC    Outcome: Progressing     Problem: SAFETY ADULT  Goal: Patient will remain free of falls  Description: INTERVENTIONS:  - Educate patient/family on patient safety including physical limitations  - Instruct patient to call for assistance with activity   - Consult OT/PT to assist with strengthening/mobility   - Keep Call bell within reach  - Keep bed low and locked with side rails adjusted as appropriate  - Keep care items and personal belongings within reach  - Initiate and maintain comfort rounds  - Make Fall Risk Sign visible to staff  - Offer Toileting every  Hours,  in advance of need  - Initiate/Maintain alarm  - Obtain necessary fall risk management equipment:   - Apply yellow socks and bracelet for high fall risk patients  - Consider moving patient to room near nurses station  Outcome: Progressing  Goal: Maintain or return to baseline ADL function  Description: INTERVENTIONS:  -  Assess patient's ability to carry out ADLs; assess patient's baseline for ADL function and identify physical deficits which impact ability to perform ADLs (bathing, care of mouth/teeth, toileting, grooming, dressing, etc.)  - Assess/evaluate cause of self-care deficits   - Assess range of motion  - Assess patient's mobility; develop plan if impaired  - Assess patient's need for assistive devices and provide as appropriate  - Encourage maximum independence but intervene and supervise when necessary  - Involve family in performance of ADLs  - Assess for home care needs following discharge   - Consider OT consult to assist with ADL evaluation and planning for discharge  - Provide patient education as appropriate  Outcome: Progressing  Goal: Maintains/Returns to pre admission functional level  Description: INTERVENTIONS:  - Perform AM-PAC 6 Click Basic Mobility/ Daily Activity assessment daily.  - Set and communicate daily mobility goal to care team and patient/family/caregiver.   - Collaborate with rehabilitation services on mobility goals if consulted  - Perform Range of Motion  times a day.  - Reposition patient every  hours.  - Dangle patient  times a day  - Stand patient  times a day  - Ambulate patient  times a day  - Out of bed to chair  times a day   - Out of bed for meals  times a day  - Out of bed for toileting  - Record patient progress and toleration of activity level   Outcome: Progressing     Problem: DISCHARGE PLANNING  Goal: Discharge to home or other facility with appropriate resources  Description: INTERVENTIONS:  - Identify barriers to discharge w/patient and caregiver  - Arrange for  needed discharge resources and transportation as appropriate  - Identify discharge learning needs (meds, wound care, etc.)  - Arrange for interpretive services to assist at discharge as needed  - Refer to Case Management Department for coordinating discharge planning if the patient needs post-hospital services based on physician/advanced practitioner order or complex needs related to functional status, cognitive ability, or social support system  Outcome: Progressing     Problem: Knowledge Deficit  Goal: Patient/family/caregiver demonstrates understanding of disease process, treatment plan, medications, and discharge instructions  Description: Complete learning assessment and assess knowledge base.  Interventions:  - Provide teaching at level of understanding  - Provide teaching via preferred learning methods  Outcome: Progressing     Problem: NEUROSENSORY - ADULT  Goal: Achieves stable or improved neurological status  Description: INTERVENTIONS  - Monitor and report changes in neurological status  - Monitor vital signs such as temperature, blood pressure, glucose, and any other labs ordered   - Initiate measures to prevent increased intracranial pressure  - Monitor for seizure activity and implement precautions if appropriate      Outcome: Progressing  Goal: Remains free of injury related to seizures activity  Description: INTERVENTIONS  - Maintain airway, patient safety  and administer oxygen as ordered  - Monitor patient for seizure activity, document and report duration and description of seizure to physician/advanced practitioner  - If seizure occurs,  ensure patient safety during seizure  - Reorient patient post seizure  - Seizure pads on all 4 side rails  - Instruct patient/family to notify RN of any seizure activity including if an aura is experienced  - Instruct patient/family to call for assistance with activity based on nursing assessment  - Administer anti-seizure medications if ordered    Outcome:  Progressing  Goal: Achieves maximal functionality and self care  Description: INTERVENTIONS  - Monitor swallowing and airway patency with patient fatigue and changes in neurological status  - Encourage and assist patient to increase activity and self care.   - Encourage visually impaired, hearing impaired and aphasic patients to use assistive/communication devices  Outcome: Progressing     Problem: CARDIOVASCULAR - ADULT  Goal: Maintains optimal cardiac output and hemodynamic stability  Description: INTERVENTIONS:  - Monitor I/O, vital signs and rhythm  - Monitor for S/S and trends of decreased cardiac output  - Administer and titrate ordered vasoactive medications to optimize hemodynamic stability  - Assess quality of pulses, skin color and temperature  - Assess for signs of decreased coronary artery perfusion  - Instruct patient to report change in severity of symptoms  Outcome: Progressing  Goal: Absence of cardiac dysrhythmias or at baseline rhythm  Description: INTERVENTIONS:  - Continuous cardiac monitoring, vital signs, obtain 12 lead EKG if ordered  - Administer antiarrhythmic and heart rate control medications as ordered  - Monitor electrolytes and administer replacement therapy as ordered  Outcome: Progressing     Problem: RESPIRATORY - ADULT  Goal: Achieves optimal ventilation and oxygenation  Description: INTERVENTIONS:  - Assess for changes in respiratory status  - Assess for changes in mentation and behavior  - Position to facilitate oxygenation and minimize respiratory effort  - Oxygen administered by appropriate delivery if ordered  - Initiate smoking cessation education as indicated  - Encourage broncho-pulmonary hygiene including cough, deep breathe, Incentive Spirometry  - Assess the need for suctioning and aspirate as needed  - Assess and instruct to report SOB or any respiratory difficulty  - Respiratory Therapy support as indicated  Outcome: Progressing     Problem: GASTROINTESTINAL -  ADULT  Goal: Minimal or absence of nausea and/or vomiting  Description: INTERVENTIONS:  - Administer IV fluids if ordered to ensure adequate hydration  - Maintain NPO status until nausea and vomiting are resolved  - Nasogastric tube if ordered  - Administer ordered antiemetic medications as needed  - Provide nonpharmacologic comfort measures as appropriate  - Advance diet as tolerated, if ordered  - Consider nutrition services referral to assist patient with adequate nutrition and appropriate food choices  Outcome: Progressing  Goal: Maintains or returns to baseline bowel function  Description: INTERVENTIONS:  - Assess bowel function  - Encourage oral fluids to ensure adequate hydration  - Administer IV fluids if ordered to ensure adequate hydration  - Administer ordered medications as needed  - Encourage mobilization and activity  - Consider nutritional services referral to assist patient with adequate nutrition and appropriate food choices  Outcome: Progressing  Goal: Maintains adequate nutritional intake  Description: INTERVENTIONS:  - Monitor percentage of each meal consumed  - Identify factors contributing to decreased intake, treat as appropriate  - Assist with meals as needed  - Monitor I&O, weight, and lab values if indicated  - Obtain nutrition services referral as needed  Outcome: Progressing  Goal: Establish and maintain optimal ostomy function  Description: INTERVENTIONS:  - Assess bowel function  - Encourage oral fluids to ensure adequate hydration  - Administer IV fluids if ordered to ensure adequate hydration   - Administer ordered medications as needed  - Encourage mobilization and activity  - Nutrition services referral to assist patient with appropriate food choices  - Assess stoma site  - Consider wound care consult   Outcome: Progressing  Goal: Oral mucous membranes remain intact  Description: INTERVENTIONS  - Assess oral mucosa and hygiene practices  - Implement preventative oral hygiene  regimen  - Implement oral medicated treatments as ordered  - Initiate Nutrition services referral as needed  Outcome: Progressing     Problem: GENITOURINARY - ADULT  Goal: Maintains or returns to baseline urinary function  Description: INTERVENTIONS:  - Assess urinary function  - Encourage oral fluids to ensure adequate hydration if ordered  - Administer IV fluids as ordered to ensure adequate hydration  - Administer ordered medications as needed  - Offer frequent toileting  - Follow urinary retention protocol if ordered  Outcome: Progressing  Goal: Absence of urinary retention  Description: INTERVENTIONS:  - Assess patient’s ability to void and empty bladder  - Monitor I/O  - Bladder scan as needed  - Discuss with physician/AP medications to alleviate retention as needed  - Discuss catheterization for long term situations as appropriate  Outcome: Progressing  Goal: Urinary catheter remains patent  Description: INTERVENTIONS:  - Assess patency of urinary catheter  - If patient has a chronic peace, consider changing catheter if non-functioning  - Follow guidelines for intermittent irrigation of non-functioning urinary catheter  Outcome: Progressing     Problem: SKIN/TISSUE INTEGRITY - ADULT  Goal: Skin Integrity remains intact(Skin Breakdown Prevention)  Description: Assess:  -Perform [place] assessment every   -Clean and moisturize skin every   -Inspect skin when repositioning, toileting, and assisting with ADLS  -Assess under medical devices such as  every   -Assess extremities for adequate circulation and sensation     Bed Management:  -Have minimal linens on bed & keep smooth, unwrinkled  -Change linens as needed when moist or perspiring  -Avoid sitting or lying in one position for more than  hours while in bed  -Keep HOB at degrees     Toileting:  -Offer bedside commode  -Assess for incontinence every   -Use incontinent care products after each incontinent episode such as     Activity:  -Mobilize patient  times a  day  -Encourage activity and walks on unit  -Encourage or provide ROM exercises   -Turn and reposition patient every  Hours  -Use appropriate equipment to lift or move patient in bed  -Instruct/ Assist with weight shifting every  when out of bed in chair  -Consider limitation of chair time  hour intervals    Skin Care:  -Avoid use of baby powder, tape, friction and shearing, hot water or constrictive clothing  -Relieve pressure over bony prominences using   -Do not massage red bony areas    Next Steps:  -Teach patient strategies to minimize risks such as    -Consider consults to  interdisciplinary teams such as   Outcome: Progressing  Goal: Incision(s), wounds(s) or drain site(s) healing without S/S of infection  Description: INTERVENTIONS  - Assess and document dressing, incision, wound bed, drain sites and surrounding tissue  - Provide patient and family education  - Perform skin care/dressing changes every   Outcome: Progressing  Goal: Pressure injury heals and does not worsen  Description: Interventions:  - Implement low air loss mattress or specialty surface (Criteria met)  - Apply silicone foam dressing  - Instruct/assist with weight shifting every  minutes when in chair   - Limit chair time to  hour intervals  - Use special pressure reducing interventions such as  when in chair   - Apply fecal or urinary incontinence containment device   - Perform passive or active ROM every   - Turn and reposition patient & offload bony prominences every  hours   - Utilize friction reducing device or surface for transfers   - Consider consults to  interdisciplinary teams such as   - Use incontinent care products after each incontinent episode such as   - Consider nutrition services referral as needed  Outcome: Progressing     Problem: MUSCULOSKELETAL - ADULT  Goal: Maintain or return mobility to safest level of function  Description: INTERVENTIONS:  - Assess patient's ability to carry out ADLs; assess patient's baseline for  ADL function and identify physical deficits which impact ability to perform ADLs (bathing, care of mouth/teeth, toileting, grooming, dressing, etc.)  - Assess/evaluate cause of self-care deficits   - Assess range of motion  - Assess patient's mobility  - Assess patient's need for assistive devices and provide as appropriate  - Encourage maximum independence but intervene and supervise when necessary  - Involve family in performance of ADLs  - Assess for home care needs following discharge   - Consider OT consult to assist with ADL evaluation and planning for discharge  - Provide patient education as appropriate  Outcome: Progressing  Goal: Maintain proper alignment of affected body part  Description: INTERVENTIONS:  - Support, maintain and protect limb and body alignment  - Provide patient/ family with appropriate education  Outcome: Progressing     Problem: Prexisting or High Potential for Compromised Skin Integrity  Goal: Skin integrity is maintained or improved  Description: INTERVENTIONS:  - Identify patients at risk for skin breakdown  - Assess and monitor skin integrity  - Assess and monitor nutrition and hydration status  - Monitor labs   - Assess for incontinence   - Turn and reposition patient  - Assist with mobility/ambulation  - Relieve pressure over bony prominences  - Avoid friction and shearing  - Provide appropriate hygiene as needed including keeping skin clean and dry  - Evaluate need for skin moisturizer/barrier cream  - Collaborate with interdisciplinary team   - Patient/family teaching  - Consider wound care consult   Outcome: Progressing

## 2024-11-24 NOTE — PLAN OF CARE
Problem: SAFETY ADULT  Goal: Patient will remain free of falls  Description: INTERVENTIONS:  - Educate patient/family on patient safety including physical limitations  - Instruct patient to call for assistance with activity   - Consult OT/PT to assist with strengthening/mobility   - Keep Call bell within reach  - Keep bed low and locked with side rails adjusted as appropriate  - Keep care items and personal belongings within reach  - Initiate and maintain comfort rounds  - Make Fall Risk Sign visible to staff  - Apply yellow socks and bracelet for high fall risk patients  - Consider moving patient to room near nurses station  Outcome: Progressing  Goal: Maintain or return to baseline ADL function  Description: INTERVENTIONS:  -  Assess patient's ability to carry out ADLs; assess patient's baseline for ADL function and identify physical deficits which impact ability to perform ADLs (bathing, care of mouth/teeth, toileting, grooming, dressing, etc.)  - Assess/evaluate cause of self-care deficits   - Assess range of motion  - Assess patient's mobility; develop plan if impaired  - Assess patient's need for assistive devices and provide as appropriate  - Encourage maximum independence but intervene and supervise when necessary  - Involve family in performance of ADLs  - Assess for home care needs following discharge   - Consider OT consult to assist with ADL evaluation and planning for discharge  - Provide patient education as appropriate  Outcome: Progressing  Goal: Maintains/Returns to pre admission functional level  Description: INTERVENTIONS:  - Perform AM-PAC 6 Click Basic Mobility/ Daily Activity assessment daily.  - Set and communicate daily mobility goal to care team and patient/family/caregiver.   - Collaborate with rehabilitation services on mobility goals if consulted  - Out of bed for toileting  - Record patient progress and toleration of activity level   Outcome: Progressing     Problem: RESPIRATORY -  ADULT  Goal: Achieves optimal ventilation and oxygenation  Description: INTERVENTIONS:  - Assess for changes in respiratory status  - Assess for changes in mentation and behavior  - Position to facilitate oxygenation and minimize respiratory effort  - Oxygen administered by appropriate delivery if ordered  - Initiate smoking cessation education as indicated  - Encourage broncho-pulmonary hygiene including cough, deep breathe, Incentive Spirometry  - Assess the need for suctioning and aspirate as needed  - Assess and instruct to report SOB or any respiratory difficulty  - Respiratory Therapy support as indicated  Outcome: Progressing

## 2024-11-24 NOTE — PROGRESS NOTES
Progress Note - Critical Care/ICU   Name: Adelina Soto 64 y.o. female I MRN: [id number]  [facility]: 11[facility]12/[facility]24   Date of Service: 11/24/2024 I Hospital Day: 12       Patient had chest x-ray earlier because of the fever, also blood cultures send UA.  UA was not showing any evidence of UTI.  Chest x-ray looked somewhat changed from prior.  Performed bilateral lung ultrasound, concern for hepatization on the right lower lobe on the ultrasound.  At this point we will start the patient on Rocephin for 5 days.    Patient also has right hand swelling, will discontinue IV on the right arm, will perform upper extremity bilateral venous Doppler to rule out DVT

## 2024-11-24 NOTE — PROGRESS NOTES
Progress Note - Critical Care/ICU   Name: Adelina Soto 64 y.o. female I MRN: [id number]  [facility] Date of Admission: 11/12/2024   Date of Service: 11/24/2024 I Hospital Day: 12       Summary:  Patient is a 64 year old female with history of Afib on Warfarin, HTN, T2DM, morbid obesity, HLD, hypothyroidism, initially presented to [facility] on 11/12 after being found by her partner at home with AMS, seizure-like activity. EMS witnessed tonic-clonic seizure-like activity, treated with 6 mg of Versed. 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 Was subsequently intubated for airway protection and transferred to [facility]. Initial LP generally unremarkable apart from RBC's 300's from otherwise atraumatic tap, negative ME panel and HSV. 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      [facility], during evaluation pressors were weaned and AEDs adjusted. Underwent CT head which appeared unchanged, LP on 11/19 with fluoro unsuccessful. Off video EEG 11/20, repeat MRI showed no acute findings. Minimal improvements in neurological exam thus far, does open eyes to voice and blink on command. Ongoing discussion regarding goals of care, tracheostomy/feeding tube in LTAC versus comfort care, POA thus far remains treatment oriented. Tentatively planned for family meeting 11/25.    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, started on acyclovir  Neurology following, vEEG initially showing left frontal seizures, burst suppressed initially, now on Keppra 1g q12h, Phenytoin 100 mg q8h, Depacon 375 mg q6h  LP showed glucose 89, protein 74,  339 RBC's, 2 WBC's, ME panel negative  ID following, recommend continuing acyclovir 10 mg/kg every 12 hours for time being  Off vEEG 11/20  Repeat MRI 11/20 unremarkable     Diagnosis: H/o depression  Plan:  Continue home Zoloft     CV:   Diagnosis: Shock/hypotension, likely in setting of sedation  Plan:  MAP 65-70, off pressors     Diagnosis: Elevated troponin  54 on arrival, peaked at 1887 on 11/13  ECG without ST changes  Likely demand related due to RVR and/or hypotension as above  Plan:  Monitor telemetry, rate control < 110 bpm     Diagnosis: H/o Afib on Warfarin  Plan:  Lopressor 25 mg PO BID  AC with Lovenox 1 mg/kg BID     Pulm:  Diagnosis: Acute hypoxic respiratory failure  Intubated on arrival for airway protection due to AMS  Vent settings: SCMV, 10/420/8/40%  Bronch 11/12 with generalized edematous friable mucosa and moderate thick purulent secretions, culture with mixed respiratory viktoriya  Initial chest x-ray on arrival with mild edema more so on the right, left-sided whiteout likely from mucous plugging and atelectasis, given improvement after bronchoscopy, now continues to have crackles bilaterally  Repeat bronchoscopy 11/20 generally unremarkable     GI:   Diagnosis: GERD  Plan:  Continue home pepcid     :   Diagnosis: CKD  Baseline Cr 1.4-1.8  Remains baseline throughout hospital stay  Monitor renal fx, monitor I/O's, avoid nephrotoxins     F/E/N:   F - none  E - K > 4, Mg > 2, Phos > 3  N - TF with Vital 1.2 @ 55/hr     Heme/Onc:   Diagnosis: Anemia  Hgb 9-10 this admission  Stable thus far, transfuse if < 7     Endo:   Diagnosis: Hypothyroidism  TSH 0.29, T4 1.23  Continue home levothyroxine     Diagnosis: T2DM  Diet controlled, not on medications outpatient  Monitor BG for goal 140-180  Add SSI if needed     ID:   Diagnosis: Possible encephalitis  Initial MRI brain w/ c/f HSV encephalitis  LP and ME panel neg  ID following, continue Acyclovir 10 mg/kg q12h for 2 weeks total  11/22 low grade  fever, tachycardic and hypotensive with MAP to 50's  Repeat infectious w/u if repeatedly elevated temp     Diagnosis: UTI, possible pneumonia  Urine culture growing E. coli and ESBL proteus  Earlier bronchoscopy with copious purulent secretions, however culture with mixed respiratory viktoriya  Not treated earlier in admission given lack of fever, improvement in leukocytosis     MSK/Skin:   Diagnosis: Pressure wounds  Continue wound care    Disposition: Critical care    ICU Core Measures     Vented Patient  VAP Bundle  VAP bundle ordered     A: Assess, Prevent, and Manage Pain Has pain been assessed? Yes  Need for changes to pain regimen? No   B: Both Spontaneous Awakening Trials (SATs) and Spontaneous Breathing Trials (SBTs) Plan to perform spontaneous awakening trial today? Yes   Plan to perform spontaneous breathing trial today? Yes   Obvious barriers to extubation? Yes   C: Choice of Sedation RASS Goal: 0 Alert and Calm  Need for changes to sedation or analgesia regimen? No   D: Delirium CAM-ICU: Unable to perform secondary to Acute cognitive dysfunction   E: Early Mobility  Plan for early mobility? NA   F: Family Engagement Plan for family engagement today? Yes       Antibiotic Review: Patient on appropriate coverage based on culture data.     Review of Invasive Devices:    Stringer Plan: Continue for accurate I/O monitoring for 48 hours        Prophylaxis:  VTE VTE covered by:  enoxaparin, Subcutaneous, 120 mg at 11/23/24 2001       Stress Ulcer  covered byfamotidine (PEPCID) 20 mg tablet [[id number]] (Long-Term Med), famotidine (PEPCID) tablet 20 mg [[id number]], pantoprazole (PROTONIX) 40 mg tablet [[id number]] (Long-Term Med)         24 Hour Events : Yesterday evening low grade temp 100.8 F. Infectious workup repeated, UA generally unremarkable and CXR unchanged from prior. Blood cultures pending. Was able to open eyes to voice today, blink on command.     Subjective   Review of Systems: Review of Systems not  obtainable due to Clinical Condition, Altered mental status    Objective :                   Vitals I/O      Most Recent Min/Max in 24hrs   Temp 98.4 °F (36.9 °C) Temp  Min: 97.3 °F (36.3 °C)  Max: 100.8 °F (38.2 °C)   Pulse (!) 107 Pulse  Min: 101  Max: 137   Resp 16 Resp  Min: 16  Max: 34   BP 95/52 BP  Min: 90/51  Max: 137/69   O2 Sat 97 % SpO2  Min: 86 %  Max: 99 %      Intake/Output Summary (Last 24 hours) at 11/24/2024 0759  Last data filed at 11/24/2024 0656  Gross per 24 hour   Intake 2225 ml   Output 1205 ml   Net 1020 ml       Diet Enteral/Parenteral; Tube Feeding No Oral Diet; Vital AF 1.2; Cyclic; 55; 22 hours; 200; Every 4 hours    Invasive Monitoring           Physical Exam   Physical Exam  Vitals and nursing note reviewed.   Eyes:      Comments: Pupils were equal, sluggish but responsive to light   HENT:      Head: Normocephalic and atraumatic.   Cardiovascular:      Rate and Rhythm: Regular rhythm. Tachycardia present.   Abdominal:      Palpations: Abdomen is soft.      Tenderness: There is no abdominal tenderness.   Constitutional:       Appearance: She is ill-appearing.      Interventions: She is intubated and restrained. She is not sedated.  Pulmonary:      Effort: Pulmonary effort is normal. She is intubated.      Breath sounds: Rales present.   Neurological:      GCS: GCS eye subscore is 2. GCS verbal subscore is 1. GCS motor subscore is 1.      Comments: Opens eyes to voice, was able to blink on command.   Genitourinary/Anorectal:  Stringer present.        Diagnostic Studies        Lab Results: I have reviewed the following results:     Medications:  Scheduled PRN   acyclovir, 10 mg/kg (Adjusted), Q12H  atorvastatin, 10 mg, Daily With Dinner  cefTRIAXone, 1,000 mg, Q24H  chlorhexidine, 15 mL, Q12H LINO  enoxaparin, 1 mg/kg, Q12H LINO  famotidine, 20 mg, Daily  insulin lispro, 2-12 Units, Q6H LINO  levalbuterol, 1.25 mg, TID  levETIRAcetam, 750 mg, Q12H LINO  levothyroxine, 100 mcg, Early  Morning  metoprolol tartrate, 25 mg, Q12H LINO  EARLINE ANTIFUNGAL, , BID  sertraline, 50 mg, Daily  thiamine, 100 mg, Daily  valproate sodium, 500 mg, Q6H LINO      acetaminophen, 1,000 mg, Q6H PRN  albuterol, 2.5 mg, Q6H PRN  LORazepam, 2 mg, Q4H PRN  ondansetron, 4 mg, Q4H PRN       Continuous          Labs:   CBC    Recent Labs     11/23/24  0544 11/24/24  0541   WBC 8.09 8.87   HGB 8.8* 8.4*   HCT 29.5* 28.7*    247     BMP    Recent Labs     11/23/24  0501 11/24/24  0541   SODIUM 142 141   K 4.0 5.6*    102   CO2 34* 34*   AGAP 6 5   BUN 24 26*   CREATININE 1.38* 1.31*   CALCIUM 9.0 9.0       Coags    No recent results       Additional Electrolytes  Recent Labs     11/23/24  0501 11/23/24  0542 11/24/24  0541   MG  --  2.1 2.0   PHOS 3.1  --  3.7          Blood Gas    No recent results  No recent results LFTs  No recent results      Infectious  No recent results  Glucose  Recent Labs     11/23/24  0501 11/24/24  0541   GLUC 126 113

## 2024-11-25 PROBLEM — R78.81 POSITIVE BLOOD CULTURE: Status: ACTIVE | Noted: 2024-11-25

## 2024-11-25 LAB
ANION GAP SERPL CALCULATED.3IONS-SCNC: 9 MMOL/L (ref 4–13)
ANISOCYTOSIS BLD QL SMEAR: PRESENT
ARTERIAL PATENCY WRIST A: YES
ATRIAL RATE: 80 BPM
BASE EXCESS BLDA CALC-SCNC: 7.5 MMOL/L
BASOPHILS # BLD MANUAL: 0.07 THOUSAND/UL (ref 0–0.1)
BASOPHILS NFR MAR MANUAL: 1 % (ref 0–1)
BUN SERPL-MCNC: 24 MG/DL (ref 5–25)
CALCIUM SERPL-MCNC: 8.3 MG/DL (ref 8.4–10.2)
CHLORIDE SERPL-SCNC: 98 MMOL/L (ref 96–108)
CO2 SERPL-SCNC: 31 MMOL/L (ref 21–32)
CREAT SERPL-MCNC: 1.35 MG/DL (ref 0.6–1.3)
EOSINOPHIL # BLD MANUAL: 0.15 THOUSAND/UL (ref 0–0.4)
EOSINOPHIL NFR BLD MANUAL: 2 % (ref 0–6)
ERYTHROCYTE [DISTWIDTH] IN BLOOD BY AUTOMATED COUNT: 18.1 % (ref 11.6–15.1)
GFR SERPL CREATININE-BSD FRML MDRD: 41 ML/MIN/1.73SQ M
GLUCOSE SERPL-MCNC: 101 MG/DL (ref 65–140)
GLUCOSE SERPL-MCNC: 115 MG/DL (ref 65–140)
GLUCOSE SERPL-MCNC: 199 MG/DL (ref 65–140)
GLUCOSE SERPL-MCNC: 94 MG/DL (ref 65–140)
HCO3 BLDA-SCNC: 33.1 MMOL/L (ref 22–28)
HCT VFR BLD AUTO: 26.3 % (ref 34.8–46.1)
HGB BLD-MCNC: 7.9 G/DL (ref 11.5–15.4)
HOROWITZ INDEX BLDA+IHG-RTO: 40 MM[HG]
LYMPHOCYTES # BLD AUTO: 1.41 THOUSAND/UL (ref 0.6–4.47)
LYMPHOCYTES # BLD AUTO: 18 % (ref 14–44)
MACROCYTES BLD QL AUTO: PRESENT
MAGNESIUM SERPL-MCNC: 1.8 MG/DL (ref 1.9–2.7)
MCH RBC QN AUTO: 32.8 PG (ref 26.8–34.3)
MCHC RBC AUTO-ENTMCNC: 30 G/DL (ref 31.4–37.4)
MCV RBC AUTO: 109 FL (ref 82–98)
METAMYELOCYTE ABSOLUTE CT: 0.07 THOUSAND/UL (ref 0–0.1)
METAMYELOCYTES NFR BLD MANUAL: 1 % (ref 0–1)
MONOCYTES # BLD AUTO: 0.96 THOUSAND/UL (ref 0–1.22)
MONOCYTES NFR BLD: 13 % (ref 4–12)
NEUTROPHILS # BLD MANUAL: 4.74 THOUSAND/UL (ref 1.85–7.62)
NEUTS BAND NFR BLD MANUAL: 1 % (ref 0–8)
NEUTS SEG NFR BLD AUTO: 63 % (ref 43–75)
NRBC BLD AUTO-RTO: 2 /100 WBC (ref 0–2)
O2 CT BLDA-SCNC: 10.4 ML/DL (ref 16–23)
OXYHGB MFR BLDA: 95.3 % (ref 94–97)
PCO2 BLDA: 53.5 MM HG (ref 36–44)
PEEP RESPIRATORY: 8 CM[H2O]
PH BLDA: 7.41 [PH] (ref 7.35–7.45)
PHOSPHATE SERPL-MCNC: 3.1 MG/DL (ref 2.3–4.1)
PLATELET # BLD AUTO: 256 THOUSANDS/UL (ref 149–390)
PLATELET BLD QL SMEAR: ADEQUATE
PMV BLD AUTO: 9.3 FL (ref 8.9–12.7)
PO2 BLDA: 92.6 MM HG (ref 75–129)
POLYCHROMASIA BLD QL SMEAR: PRESENT
POTASSIUM SERPL-SCNC: 4.2 MMOL/L (ref 3.5–5.3)
QRS AXIS: 40 DEGREES
QRSD INTERVAL: 60 MS
QT INTERVAL: 274 MS
QTC INTERVAL: 396 MS
RBC # BLD AUTO: 2.41 MILLION/UL (ref 3.81–5.12)
RBC MORPH BLD: PRESENT
SODIUM SERPL-SCNC: 138 MMOL/L (ref 135–147)
SPECIMEN SOURCE: ABNORMAL
T WAVE AXIS: 137 DEGREES
VARIANT LYMPHS # BLD AUTO: 1 %
VENT AC: 10
VENT- AC: AC
VENTRICULAR RATE: 126 BPM
VT SETTING VENT: 420 ML
WBC # BLD AUTO: 7.4 THOUSAND/UL (ref 4.31–10.16)

## 2024-11-25 PROCEDURE — 36600 WITHDRAWAL OF ARTERIAL BLOOD: CPT

## 2024-11-25 PROCEDURE — 84100 ASSAY OF PHOSPHORUS: CPT

## 2024-11-25 PROCEDURE — 80048 BASIC METABOLIC PNL TOTAL CA: CPT

## 2024-11-25 PROCEDURE — 94760 N-INVAS EAR/PLS OXIMETRY 1: CPT

## 2024-11-25 PROCEDURE — 83735 ASSAY OF MAGNESIUM: CPT

## 2024-11-25 PROCEDURE — 94669 MECHANICAL CHEST WALL OSCILL: CPT

## 2024-11-25 PROCEDURE — 82948 REAGENT STRIP/BLOOD GLUCOSE: CPT

## 2024-11-25 PROCEDURE — 93010 ELECTROCARDIOGRAM REPORT: CPT | Performed by: INTERNAL MEDICINE

## 2024-11-25 PROCEDURE — 85027 COMPLETE CBC AUTOMATED: CPT

## 2024-11-25 PROCEDURE — 85007 BL SMEAR W/DIFF WBC COUNT: CPT

## 2024-11-25 PROCEDURE — 82805 BLOOD GASES W/O2 SATURATION: CPT | Performed by: STUDENT IN AN ORGANIZED HEALTH CARE EDUCATION/TRAINING PROGRAM

## 2024-11-25 PROCEDURE — 99232 SBSQ HOSP IP/OBS MODERATE 35: CPT | Performed by: STUDENT IN AN ORGANIZED HEALTH CARE EDUCATION/TRAINING PROGRAM

## 2024-11-25 PROCEDURE — 94640 AIRWAY INHALATION TREATMENT: CPT

## 2024-11-25 PROCEDURE — 99291 CRITICAL CARE FIRST HOUR: CPT | Performed by: INTERNAL MEDICINE

## 2024-11-25 PROCEDURE — 94664 DEMO&/EVAL PT USE INHALER: CPT

## 2024-11-25 PROCEDURE — 94003 VENT MGMT INPAT SUBQ DAY: CPT

## 2024-11-25 PROCEDURE — 93971 EXTREMITY STUDY: CPT | Performed by: SURGERY

## 2024-11-25 RX ORDER — FENTANYL CITRATE 50 UG/ML
25 INJECTION, SOLUTION INTRAMUSCULAR; INTRAVENOUS ONCE
Refills: 0 | Status: COMPLETED | OUTPATIENT
Start: 2024-11-25 | End: 2024-11-25

## 2024-11-25 RX ORDER — ALBUMIN HUMAN 50 G/1000ML
25 SOLUTION INTRAVENOUS ONCE
Status: COMPLETED | OUTPATIENT
Start: 2024-11-25 | End: 2024-11-25

## 2024-11-25 RX ORDER — FENTANYL CITRATE/PF 50 MCG/ML
SYRINGE (ML) INJECTION
Status: COMPLETED
Start: 2024-11-25 | End: 2024-11-25

## 2024-11-25 RX ORDER — MAGNESIUM SULFATE HEPTAHYDRATE 40 MG/ML
2 INJECTION, SOLUTION INTRAVENOUS ONCE
Status: COMPLETED | OUTPATIENT
Start: 2024-11-25 | End: 2024-11-25

## 2024-11-25 RX ADMIN — CEFTRIAXONE SODIUM 1000 MG: 10 INJECTION, POWDER, FOR SOLUTION INTRAVENOUS at 03:32

## 2024-11-25 RX ADMIN — MAGNESIUM SULFATE HEPTAHYDRATE 2 G: 40 INJECTION, SOLUTION INTRAVENOUS at 07:33

## 2024-11-25 RX ADMIN — LEVETIRACETAM 750 MG: 750 TABLET, FILM COATED ORAL at 21:05

## 2024-11-25 RX ADMIN — MICONAZOLE NITRATE: 20 CREAM TOPICAL at 17:34

## 2024-11-25 RX ADMIN — FENTANYL CITRATE 25 MCG: 50 INJECTION INTRAMUSCULAR; INTRAVENOUS at 03:27

## 2024-11-25 RX ADMIN — METOPROLOL TARTRATE 50 MG: 50 TABLET, FILM COATED ORAL at 21:05

## 2024-11-25 RX ADMIN — LEVALBUTEROL HYDROCHLORIDE 1.25 MG: 1.25 SOLUTION RESPIRATORY (INHALATION) at 13:36

## 2024-11-25 RX ADMIN — ENOXAPARIN SODIUM 120 MG: 120 INJECTION SUBCUTANEOUS at 21:06

## 2024-11-25 RX ADMIN — CHLORHEXIDINE GLUCONATE 0.12% ORAL RINSE 15 ML: 1.2 LIQUID ORAL at 21:05

## 2024-11-25 RX ADMIN — ACETAMINOPHEN 1000 MG: 10 INJECTION INTRAVENOUS at 04:51

## 2024-11-25 RX ADMIN — FENTANYL CITRATE 25 MCG: 50 INJECTION INTRAMUSCULAR; INTRAVENOUS at 20:31

## 2024-11-25 RX ADMIN — FAMOTIDINE 20 MG: 20 TABLET, FILM COATED ORAL at 08:33

## 2024-11-25 RX ADMIN — LEVETIRACETAM 750 MG: 750 TABLET, FILM COATED ORAL at 08:33

## 2024-11-25 RX ADMIN — ATORVASTATIN CALCIUM 10 MG: 10 TABLET, FILM COATED ORAL at 17:36

## 2024-11-25 RX ADMIN — ALBUMIN (HUMAN) 25 G: 12.5 INJECTION, SOLUTION INTRAVENOUS at 00:35

## 2024-11-25 RX ADMIN — LEVALBUTEROL HYDROCHLORIDE 1.25 MG: 1.25 SOLUTION RESPIRATORY (INHALATION) at 19:29

## 2024-11-25 RX ADMIN — VALPROATE SODIUM 500 MG: 100 INJECTION, SOLUTION INTRAVENOUS at 21:05

## 2024-11-25 RX ADMIN — VALPROATE SODIUM 500 MG: 100 INJECTION, SOLUTION INTRAVENOUS at 03:27

## 2024-11-25 RX ADMIN — LEVALBUTEROL HYDROCHLORIDE 1.25 MG: 1.25 SOLUTION RESPIRATORY (INHALATION) at 07:52

## 2024-11-25 RX ADMIN — VALPROATE SODIUM 500 MG: 100 INJECTION, SOLUTION INTRAVENOUS at 09:08

## 2024-11-25 RX ADMIN — FENTANYL CITRATE 25 MCG: 50 INJECTION, SOLUTION INTRAMUSCULAR; INTRAVENOUS at 20:31

## 2024-11-25 RX ADMIN — VALPROATE SODIUM 500 MG: 100 INJECTION, SOLUTION INTRAVENOUS at 16:35

## 2024-11-25 RX ADMIN — CHLORHEXIDINE GLUCONATE 0.12% ORAL RINSE 15 ML: 1.2 LIQUID ORAL at 08:33

## 2024-11-25 RX ADMIN — LEVOTHYROXINE SODIUM 100 MCG: 100 TABLET ORAL at 05:05

## 2024-11-25 RX ADMIN — THIAMINE HCL TAB 100 MG 100 MG: 100 TAB at 08:33

## 2024-11-25 RX ADMIN — ACYCLOVIR SODIUM 775 MG: 50 INJECTION, SOLUTION INTRAVENOUS at 09:08

## 2024-11-25 RX ADMIN — MICONAZOLE NITRATE: 20 CREAM TOPICAL at 08:34

## 2024-11-25 RX ADMIN — ACYCLOVIR SODIUM 775 MG: 50 INJECTION, SOLUTION INTRAVENOUS at 21:05

## 2024-11-25 RX ADMIN — SERTRALINE HYDROCHLORIDE 50 MG: 50 TABLET ORAL at 08:34

## 2024-11-25 RX ADMIN — ENOXAPARIN SODIUM 120 MG: 120 INJECTION SUBCUTANEOUS at 08:34

## 2024-11-25 NOTE — ASSESSMENT & PLAN NOTE
Patient intubated for airway protection.  Status post bronchoscopy 11/13 for mucous plugging and thick left-sided secretions seen.  Chest x-ray not consistent with pneumonia.  BAL culture shows growth of mixed respiratory viktoriya.  Chest x-ray with bilateral airspace opacities but no clinical evidence of pneumonia, patient has been on stable ventilator settings without significant secretions.  No clinical evidence of pneumonia at this time.   -Ventilator management per critical care team   -Discontinue ceftriaxone

## 2024-11-25 NOTE — ASSESSMENT & PLAN NOTE
Lab Results   Component Value Date    EGFR 41 11/25/2024    EGFR 42 11/24/2024    EGFR 40 11/23/2024    CREATININE 1.35 (H) 11/25/2024    CREATININE 1.31 (H) 11/24/2024    CREATININE 1.38 (H) 11/23/2024   Baseline creatinine 1.7-1.9.  Creatinine stable, slightly below baseline.  Dose adjust antibiotics as needed for creatinine clearance.  Monitor creatinine daily

## 2024-11-25 NOTE — PLAN OF CARE
Problem: PAIN - ADULT  Goal: Verbalizes/displays adequate comfort level or baseline comfort level  Description: Interventions:  - Encourage patient to monitor pain and request assistance  - Assess pain using appropriate pain scale  - Administer analgesics based on type and severity of pain and evaluate response  - Implement non-pharmacological measures as appropriate and evaluate response  - Consider cultural and social influences on pain and pain management  - Notify physician/advanced practitioner if interventions unsuccessful or patient reports new pain  Outcome: Progressing     Problem: INFECTION - ADULT  Goal: Absence or prevention of progression during hospitalization  Description: INTERVENTIONS:  - Assess and monitor for signs and symptoms of infection  - Monitor lab/diagnostic results  - Monitor all insertion sites, i.e. indwelling lines, tubes, and drains  - Monitor endotracheal if appropriate and nasal secretions for changes in amount and color  - [place] appropriate cooling/warming therapies per order  - Administer medications as ordered  - Instruct and encourage patient and family to use good hand hygiene technique  - Identify and instruct in appropriate isolation precautions for identified infection/condition  Outcome: Progressing  Goal: Absence of fever/infection during neutropenic period  Description: INTERVENTIONS:  - Monitor WBC    Outcome: Progressing     Problem: SAFETY ADULT  Goal: Patient will remain free of falls  Description: INTERVENTIONS:  - Educate patient/family on patient safety including physical limitations  - Instruct patient to call for assistance with activity   - Consult OT/PT to assist with strengthening/mobility   - Keep Call bell within reach  - Keep bed low and locked with side rails adjusted as appropriate  - Keep care items and personal belongings within reach  - Initiate and maintain comfort rounds  - Make Fall Risk Sign visible to staff  - Offer Toileting every 2 Hours,  in advance of need  - Initiate/Maintain bed alarm  - Apply yellow socks and bracelet for high fall risk patients  - Consider moving patient to room near nurses station  Outcome: Progressing  Goal: Maintain or return to baseline ADL function  Description: INTERVENTIONS:  -  Assess patient's ability to carry out ADLs; assess patient's baseline for ADL function and identify physical deficits which impact ability to perform ADLs (bathing, care of mouth/teeth, toileting, grooming, dressing, etc.)  - Assess/evaluate cause of self-care deficits   - Assess range of motion  - Assess patient's mobility; develop plan if impaired  - Assess patient's need for assistive devices and provide as appropriate  - Encourage maximum independence but intervene and supervise when necessary  - Involve family in performance of ADLs  - Assess for home care needs following discharge   - Consider OT consult to assist with ADL evaluation and planning for discharge  - Provide patient education as appropriate  Outcome: Progressing  Goal: Maintains/Returns to pre admission functional level  Description: INTERVENTIONS:  - Perform AM-PAC 6 Click Basic Mobility/ Daily Activity assessment daily.  - Set and communicate daily mobility goal to care team and patient/family/caregiver.   - Collaborate with rehabilitation services on mobility goals if consulted  - Perform Range of Motion 2 times a day.  - Reposition patient every 2 hours.  - Out of bed for toileting  - Record patient progress and toleration of activity level   Outcome: Progressing     Problem: DISCHARGE PLANNING  Goal: Discharge to home or other facility with appropriate resources  Description: INTERVENTIONS:  - Identify barriers to discharge w/patient and caregiver  - Arrange for needed discharge resources and transportation as appropriate  - Identify discharge learning needs (meds, wound care, etc.)  - Arrange for interpretive services to assist at discharge as needed  - Refer to Case  Management Department for coordinating discharge planning if the patient needs post-hospital services based on physician/advanced practitioner order or complex needs related to functional status, cognitive ability, or social support system  Outcome: Progressing     Problem: Knowledge Deficit  Goal: Patient/family/caregiver demonstrates understanding of disease process, treatment plan, medications, and discharge instructions  Description: Complete learning assessment and assess knowledge base.  Interventions:  - Provide teaching at level of understanding  - Provide teaching via preferred learning methods  Outcome: Progressing     Problem: NEUROSENSORY - ADULT  Goal: Achieves stable or improved neurological status  Description: INTERVENTIONS  - Monitor and report changes in neurological status  - Monitor vital signs such as temperature, blood pressure, glucose, and any other labs ordered   - Initiate measures to prevent increased intracranial pressure  - Monitor for seizure activity and implement precautions if appropriate      Outcome: Progressing  Goal: Remains free of injury related to seizures activity  Description: INTERVENTIONS  - Maintain airway, patient safety  and administer oxygen as ordered  - Monitor patient for seizure activity, document and report duration and description of seizure to physician/advanced practitioner  - If seizure occurs,  ensure patient safety during seizure  - Reorient patient post seizure  - Seizure pads on all 4 side rails  - Instruct patient/family to notify RN of any seizure activity including if an aura is experienced  - Instruct patient/family to call for assistance with activity based on nursing assessment  - Administer anti-seizure medications if ordered    Outcome: Progressing  Goal: Achieves maximal functionality and self care  Description: INTERVENTIONS  - Monitor swallowing and airway patency with patient fatigue and changes in neurological status  - Encourage and assist  patient to increase activity and self care.   - Encourage visually impaired, hearing impaired and aphasic patients to use assistive/communication devices  Outcome: Progressing     Problem: RESPIRATORY - ADULT  Goal: Achieves optimal ventilation and oxygenation  Description: INTERVENTIONS:  - Assess for changes in respiratory status  - Assess for changes in mentation and behavior  - Position to facilitate oxygenation and minimize respiratory effort  - Oxygen administered by appropriate delivery if ordered  - Initiate smoking cessation education as indicated  - Encourage broncho-pulmonary hygiene including cough, deep breathe, Incentive Spirometry  - Assess the need for suctioning and aspirate as needed  - Assess and instruct to report SOB or any respiratory difficulty  - Respiratory Therapy support as indicated  Outcome: Progressing     Problem: GASTROINTESTINAL - ADULT  Goal: Minimal or absence of nausea and/or vomiting  Description: INTERVENTIONS:  - Administer IV fluids if ordered to ensure adequate hydration  - Maintain NPO status until nausea and vomiting are resolved  - Nasogastric tube if ordered  - Administer ordered antiemetic medications as needed  - Provide nonpharmacologic comfort measures as appropriate  - Advance diet as tolerated, if ordered  - Consider nutrition services referral to assist patient with adequate nutrition and appropriate food choices  Outcome: Progressing  Goal: Maintains or returns to baseline bowel function  Description: INTERVENTIONS:  - Assess bowel function  - Encourage oral fluids to ensure adequate hydration  - Administer IV fluids if ordered to ensure adequate hydration  - Administer ordered medications as needed  - Encourage mobilization and activity  - Consider nutritional services referral to assist patient with adequate nutrition and appropriate food choices  Outcome: Progressing  Goal: Maintains adequate nutritional intake  Description: INTERVENTIONS:  - Monitor percentage  of each meal consumed  - Identify factors contributing to decreased intake, treat as appropriate  - Assist with meals as needed  - Monitor I&O, weight, and lab values if indicated  - Obtain nutrition services referral as needed  Outcome: Progressing  Goal: Establish and maintain optimal ostomy function  Description: INTERVENTIONS:  - Assess bowel function  - Encourage oral fluids to ensure adequate hydration  - Administer IV fluids if ordered to ensure adequate hydration   - Administer ordered medications as needed  - Encourage mobilization and activity  - Nutrition services referral to assist patient with appropriate food choices  - Assess stoma site  - Consider wound care consult   Outcome: Progressing  Goal: Oral mucous membranes remain intact  Description: INTERVENTIONS  - Assess oral mucosa and hygiene practices  - Implement preventative oral hygiene regimen  - Implement oral medicated treatments as ordered  - Initiate Nutrition services referral as needed  Outcome: Progressing     Problem: GENITOURINARY - ADULT  Goal: Maintains or returns to baseline urinary function  Description: INTERVENTIONS:  - Assess urinary function  - Encourage oral fluids to ensure adequate hydration if ordered  - Administer IV fluids as ordered to ensure adequate hydration  - Administer ordered medications as needed  - Offer frequent toileting  - Follow urinary retention protocol if ordered  Outcome: Progressing  Goal: Absence of urinary retention  Description: INTERVENTIONS:  - Assess patient’s ability to void and empty bladder  - Monitor I/O  - Bladder scan as needed  - Discuss with physician/AP medications to alleviate retention as needed  - Discuss catheterization for long term situations as appropriate  Outcome: Progressing  Goal: Urinary catheter remains patent  Description: INTERVENTIONS:  - Assess patency of urinary catheter  - If patient has a chronic peace, consider changing catheter if non-functioning  - Follow guidelines  for intermittent irrigation of non-functioning urinary catheter  Outcome: Progressing     Problem: METABOLIC, FLUID AND ELECTROLYTES - ADULT  Goal: Electrolytes maintained within normal limits  Description: INTERVENTIONS:  - Monitor labs and assess patient for signs and symptoms of electrolyte imbalances  - Administer electrolyte replacement as ordered  - Monitor response to electrolyte replacements, including repeat lab results as appropriate  - Instruct patient on fluid and nutrition as appropriate  Outcome: Progressing  Goal: Fluid balance maintained  Description: INTERVENTIONS:  - Monitor labs   - Monitor I/O and WT  - Instruct patient on fluid and nutrition as appropriate  - Assess for signs & symptoms of volume excess or deficit  Outcome: Progressing  Goal: Glucose maintained within target range  Description: INTERVENTIONS:  - Monitor Blood Glucose as ordered  - Assess for signs and symptoms of hyperglycemia and hypoglycemia  - Administer ordered medications to maintain glucose within target range  - Assess nutritional intake and initiate nutrition service referral as needed  Outcome: Progressing

## 2024-11-25 NOTE — PROGRESS NOTES
Progress Note - Infectious Disease   Name: Adelina Soto 64 y.o. female I MRN: [id number]  Unit/Bed#: MICU 01 I Date of Admission: 11/12/2024   Date of Service: 11/25/2024 I Hospital Day: 13    Assessment & Plan  Seizure (HCC)   Patient presented with acute encephalopathy and seizure-like activity.  Video EEG concerning for status epilepticus.  MRI brain without contrast was limited by motion but showed restricted diffusion in the left temporal lobe which could be postictal versus infection.  Status post LP 11/12 and CSF studies unremarkable, not consistent with infection with 2 WBCs.  Protein mildly elevated which could be due to seizure activity itself.  ME panel negative, stand alone HSV PCR also negative.  However with new onset seizures and possible abnormality of the temporal lobe HSV remains a possibility as PCR may be negative and CSF bland and early disease.  CSF culture with no growth.  The patient has grown E. coli and ESBL Proteus in urine culture but in the absence of prior systemic signs of infection or localizing symptoms UTI, doubt this is causing status epilepticus. HIV negative. Fever and leukocytosis improved.  Mental status remains poor.  IR unable to perform patient's LP due to technical difficulty.  MRI brain without evidence of acute infarct, intracranial hemorrhage or mass              -continue IV acyclovir 10 mg/kg every 12 hours.  Will complete an empiric 14-day course since unable to perform LP, through 11/26              -Monitor CBC and CMP while on acyclovir to monitor for toxicities   -AEDs per neurology  Bacteriuria  UA with pyuria and urine culture growing E. coli and ESBL Proteus.  No symptoms of infection prior to admission.  Unlikely cause of status epilepticus however in absence of clear cause of seizure activity treated for possible cystitis with 3 days zosyn   -monitor off antibiotics   Shock (HCC)  May be multifactorial related to possible infection, sedating medications.   Off vasopressor support   -Hemodynamic support per critical care team   -Antimicrobials as above  Acute hypoxic respiratory failure (HCC)  Patient intubated for airway protection.  Status post bronchoscopy 11/13 for mucous plugging and thick left-sided secretions seen.  Chest x-ray not consistent with pneumonia.  BAL culture shows growth of mixed respiratory viktoriya.  Chest x-ray with bilateral airspace opacities but no clinical evidence of pneumonia, patient has been on stable ventilator settings without significant secretions.  No clinical evidence of pneumonia at this time.   -Ventilator management per critical care team   -Discontinue ceftriaxone  Positive blood culture  1 of 2 blood cultures collected 11/24 are growing staph epidermidis. Likely contaminant. Monitor off additional antibiotics   Morbid obesity with BMI of 50.0-59.9, adult ([facility])  Affects antimicrobial dosing  CKD (chronic kidney disease)  Lab Results   Component Value Date    EGFR 41 11/25/2024    EGFR 42 11/24/2024    EGFR 40 11/23/2024    CREATININE 1.35 (H) 11/25/2024    CREATININE 1.31 (H) 11/24/2024    CREATININE 1.38 (H) 11/23/2024   Baseline creatinine 1.7-1.9.  Creatinine stable, slightly below baseline.  Dose adjust antibiotics as needed for creatinine clearance.  Monitor creatinine daily  Type 2 diabetes mellitus with hyperglycemia, unspecified whether long term insulin use ([facility])  Lab Results   Component Value Date    HGBA1C 5.7 (H) 11/13/2024   Diabetes well-controlled.  Glycemic management per primary team    Above management plan to continue IV acyclovir, discontinue ceftriaxone discussed with the critical care resident.  ID will follow.    Antibiotics:  Acyclovir day 13    Subjective   The patient remains intubated, on the ventilator, off sedation.  Family meeting planned today.  Ceftriaxone was started 11/24 due to possible pneumonia however the patient does not have purulent secretions and remains on stable ventilator requirements.   She is slightly more awake but not following commands.    Temp:  [97.7 °F (36.5 °C)-100.6 °F (38.1 °C)] 97.7 °F (36.5 °C)  HR:  [104-134] 109  BP: ()/(49-76) 109/55  Resp:  [14-23] 17  SpO2:  [91 %-98 %] 98 %  O2 Device: Ventilator    General: Intubated, not responsive   Mouth: ET tube in place  Neck: trachea midline   CV: RRR, no murmurs   Lungs: clear to auscultation bilaterally   Abdomen: no distension   Skin: no rashes, cellulitis    Lab Results: I have reviewed the following results:  Results from last 7 days   Lab Units 11/25/24  0733 11/24/24  0541 11/23/24  0544   WBC Thousand/uL 7.40 8.87 8.09   HEMOGLOBIN g/dL 7.9* 8.4* 8.8*   PLATELETS Thousands/uL 256 247 228     Results from last 7 days   Lab Units 11/25/24  0427 11/24/24  0541 11/23/24  0501 11/22/24  0508 11/21/24  0717   SODIUM mmol/L 138 141 142   < > 144   POTASSIUM mmol/L 4.2 5.6* 4.0   < > 4.1   CHLORIDE mmol/L 98 102 102   < > 106   CO2 mmol/L 31 34* 34*   < > 33*   BUN mg/dL 24 26* 24   < > 25   CREATININE mg/dL 1.35* 1.31* 1.38*   < > 1.51*   EGFR ml/min/1.73sq m 41 42 40   < > 36   CALCIUM mg/dL 8.3* 9.0 9.0   < > 8.7   AST U/L  --   --   --   --  13   ALT U/L  --   --   --   --  6*   ALK PHOS U/L  --   --   --   --  83   ALBUMIN g/dL  --   --   --   --  2.5*    < > = values in this interval not displayed.     Results from last 7 days   Lab Units 11/24/24  0032   BLOOD CULTURE  No Growth at 24 hrs.   GRAM STAIN RESULT  Gram positive cocci in clusters*               I have personally reviewed pertinent imaging reports and images in PACS.  MRI brain without acute infarct, intracranial hemorrhage or mass

## 2024-11-25 NOTE — RESPIRATORY THERAPY NOTE
RT Ventilator Management Note  Adelina Soto 64 y.o. female MRN: [id number]  Unit/Bed#: MICU 01 Encounter: [id number]      Daily Screen         11/24/2024 0756 11/24/2024 1923          Patient safety screen outcome:: Failed Failed      Not Ready for Weaning due to:: Underline problem not resolved Underline problem not resolved                Physical Exam:   Assessment Type: Pre-treatment  General Appearance: Drowsy  Respiratory Pattern: Assisted  Chest Assessment: Chest expansion symmetrical  Bilateral Breath Sounds: Diminished, Coarse  Suction: ET Tube  O2 Device: vent       11/24/24 1923   Respiratory Assessment   Assessment Type Pre-treatment   General Appearance Drowsy   Respiratory Pattern Assisted   Chest Assessment Chest expansion symmetrical   Bilateral Breath Sounds Diminished;Coarse   Suction ET Tube   Resp Comments Received on (S)CMV [id number]%, breathing above the set rate in the low 20s at this time. Plan to maintain current support at this time.   O2 Device vent   Vent Information   Vent ID [id number]   Vent type Kathleen G5   Kathleen Vent Mode (S)CMV   $ Pulse Oximetry Spot Check Charge Completed   SpO2 97 %   (S)CMV Settings   Resp Rate (BPM) 10 BPM   VT (mL) 420 mL   FIO2 (%) 40 %   PEEP (cmH2O) 8 cmH2O   I:E Ratio 1:5   Insp Time (%) 1 %   Flow Trigger (LPM) 5   Humidification Heater   Heater Temperature (Set) 98.6 °F (37 °C)   (S)CMV Actuals   Resp Rate (BPM) 17 BPM   VT (mL) 446   MV 8.4   MAP (cmH2O) 13 cmH2O   Peak Pressure (cmH2O) 26 cmH2O   I:E Ratio (Obs) 1:2.9   Insp Resistance 16   Heater Temperature (Obs) 98.6 °F (37 °C)   Static Compliance (mL/cmH20) 24 mL/cmH2O   (S)CMV Alarms   High Peak Pressure (cmH2O) 40   Low Pressure (cmH2O) 5 cm H2O   High Resp Rate (BPM) 40 BPM   Low Resp Rate (BPM) 8 BPM   High MV (L/min) 20 L/min   Low MV (L/min) 3 L/min   High VT (mL) 1000 mL   Low VT (mL) 250 mL   Apnea Time (s) 20 S   Maintenance   Alarm (pink) cable attached No   Resuscitation bag  with peep valve at bedside Yes   Water bag changed No   Circuit changed No   Daily Screen   Patient safety screen outcome: Failed   Not Ready for Weaning due to: Underline problem not resolved   IHI Ventilator Associated Pneumonia Bundle   Daily Assessment of Readiness to Extubate Yes   ETT  Cuffed 7.5 mm   Placement Date/Time: 11/12/24 1830   Preoxygenated: Yes  Technique: Video laryngoscopy  Type: Cuffed  Tube Size: 7.5 mm  Laryngoscope: GlideScope  Location: Oral  Placement Verification: Auscultation;Chest x-ray;End tidal CO2  Comments: ETT found @ 22...   Secured at (cm) 23   Measured from Lips   Secured Location Right   Repositioned Center to Right   Secured by Commercial tube hernandez   Site Condition Dry   Cuff Pressure (color) Green   HI-LO Suction  Intermittent suction   HI-LO Secretions Scant   HI-LO Intervention Patent     Resp Comments: Received on (S)CMV [id number]%, breathing above the set rate in the low 20s at this time. Plan to maintain current support at this time.

## 2024-11-25 NOTE — PLAN OF CARE
Problem: PAIN - ADULT  Goal: Verbalizes/displays adequate comfort level or baseline comfort level  Description: Interventions:  - Encourage patient to monitor pain and request assistance  - Assess pain using appropriate pain scale  - Administer analgesics based on type and severity of pain and evaluate response  - Implement non-pharmacological measures as appropriate and evaluate response  - Consider cultural and social influences on pain and pain management  - Notify physician/advanced practitioner if interventions unsuccessful or patient reports new pain  Outcome: Progressing     Problem: INFECTION - ADULT  Goal: Absence or prevention of progression during hospitalization  Description: INTERVENTIONS:  - Assess and monitor for signs and symptoms of infection  - Monitor lab/diagnostic results  - Monitor all insertion sites, i.e. indwelling lines, tubes, and drains  - Monitor endotracheal if appropriate and nasal secretions for changes in amount and color  - [place] appropriate cooling/warming therapies per order  - Administer medications as ordered  - Instruct and encourage patient and family to use good hand hygiene technique  - Identify and instruct in appropriate isolation precautions for identified infection/condition  Outcome: Progressing  Goal: Absence of fever/infection during neutropenic period  Description: INTERVENTIONS:  - Monitor WBC    Outcome: Progressing     Problem: DISCHARGE PLANNING  Goal: Discharge to home or other facility with appropriate resources  Description: INTERVENTIONS:  - Identify barriers to discharge w/patient and caregiver  - Arrange for needed discharge resources and transportation as appropriate  - Identify discharge learning needs (meds, wound care, etc.)  - Arrange for interpretive services to assist at discharge as needed  - Refer to Case Management Department for coordinating discharge planning if the patient needs post-hospital services based on physician/advanced  practitioner order or complex needs related to functional status, cognitive ability, or social support system  Outcome: Progressing     Problem: Knowledge Deficit  Goal: Patient/family/caregiver demonstrates understanding of disease process, treatment plan, medications, and discharge instructions  Description: Complete learning assessment and assess knowledge base.  Interventions:  - Provide teaching at level of understanding  - Provide teaching via preferred learning methods  Outcome: Progressing     Problem: NEUROSENSORY - ADULT  Goal: Achieves stable or improved neurological status  Description: INTERVENTIONS  - Monitor and report changes in neurological status  - Monitor vital signs such as temperature, blood pressure, glucose, and any other labs ordered   - Initiate measures to prevent increased intracranial pressure  - Monitor for seizure activity and implement precautions if appropriate      Outcome: Progressing  Goal: Remains free of injury related to seizures activity  Description: INTERVENTIONS  - Maintain airway, patient safety  and administer oxygen as ordered  - Monitor patient for seizure activity, document and report duration and description of seizure to physician/advanced practitioner  - If seizure occurs,  ensure patient safety during seizure  - Reorient patient post seizure  - Seizure pads on all 4 side rails  - Instruct patient/family to notify RN of any seizure activity including if an aura is experienced  - Instruct patient/family to call for assistance with activity based on nursing assessment  - Administer anti-seizure medications if ordered    Outcome: Progressing  Goal: Achieves maximal functionality and self care  Description: INTERVENTIONS  - Monitor swallowing and airway patency with patient fatigue and changes in neurological status  - Encourage and assist patient to increase activity and self care.   - Encourage visually impaired, hearing impaired and aphasic patients to use  assistive/communication devices  Outcome: Progressing     Problem: CARDIOVASCULAR - ADULT  Goal: Maintains optimal cardiac output and hemodynamic stability  Description: INTERVENTIONS:  - Monitor I/O, vital signs and rhythm  - Monitor for S/S and trends of decreased cardiac output  - Administer and titrate ordered vasoactive medications to optimize hemodynamic stability  - Assess quality of pulses, skin color and temperature  - Assess for signs of decreased coronary artery perfusion  - Instruct patient to report change in severity of symptoms  Outcome: Progressing  Goal: Absence of cardiac dysrhythmias or at baseline rhythm  Description: INTERVENTIONS:  - Continuous cardiac monitoring, vital signs, obtain 12 lead EKG if ordered  - Administer antiarrhythmic and heart rate control medications as ordered  - Monitor electrolytes and administer replacement therapy as ordered  Outcome: Progressing     Problem: GASTROINTESTINAL - ADULT  Goal: Minimal or absence of nausea and/or vomiting  Description: INTERVENTIONS:  - Administer IV fluids if ordered to ensure adequate hydration  - Maintain NPO status until nausea and vomiting are resolved  - Nasogastric tube if ordered  - Administer ordered antiemetic medications as needed  - Provide nonpharmacologic comfort measures as appropriate  - Advance diet as tolerated, if ordered  - Consider nutrition services referral to assist patient with adequate nutrition and appropriate food choices  Outcome: Progressing  Goal: Maintains or returns to baseline bowel function  Description: INTERVENTIONS:  - Assess bowel function  - Encourage oral fluids to ensure adequate hydration  - Administer IV fluids if ordered to ensure adequate hydration  - Administer ordered medications as needed  - Encourage mobilization and activity  - Consider nutritional services referral to assist patient with adequate nutrition and appropriate food choices  Outcome: Progressing  Goal: Maintains adequate  nutritional intake  Description: INTERVENTIONS:  - Monitor percentage of each meal consumed  - Identify factors contributing to decreased intake, treat as appropriate  - Assist with meals as needed  - Monitor I&O, weight, and lab values if indicated  - Obtain nutrition services referral as needed  Outcome: Progressing  Goal: Establish and maintain optimal ostomy function  Description: INTERVENTIONS:  - Assess bowel function  - Encourage oral fluids to ensure adequate hydration  - Administer IV fluids if ordered to ensure adequate hydration   - Administer ordered medications as needed  - Encourage mobilization and activity  - Nutrition services referral to assist patient with appropriate food choices  - Assess stoma site  - Consider wound care consult   Outcome: Progressing  Goal: Oral mucous membranes remain intact  Description: INTERVENTIONS  - Assess oral mucosa and hygiene practices  - Implement preventative oral hygiene regimen  - Implement oral medicated treatments as ordered  - Initiate Nutrition services referral as needed  Outcome: Progressing     Problem: GENITOURINARY - ADULT  Goal: Maintains or returns to baseline urinary function  Description: INTERVENTIONS:  - Assess urinary function  - Encourage oral fluids to ensure adequate hydration if ordered  - Administer IV fluids as ordered to ensure adequate hydration  - Administer ordered medications as needed  - Offer frequent toileting  - Follow urinary retention protocol if ordered  Outcome: Progressing  Goal: Absence of urinary retention  Description: INTERVENTIONS:  - Assess patient’s ability to void and empty bladder  - Monitor I/O  - Bladder scan as needed  - Discuss with physician/AP medications to alleviate retention as needed  - Discuss catheterization for long term situations as appropriate  Outcome: Progressing  Goal: Urinary catheter remains patent  Description: INTERVENTIONS:  - Assess patency of urinary catheter  - If patient has a chronic  peace, consider changing catheter if non-functioning  - Follow guidelines for intermittent irrigation of non-functioning urinary catheter  Outcome: Progressing     Problem: Nutrition/Hydration-ADULT  Goal: Nutrient/Hydration intake appropriate for improving, restoring or maintaining nutritional needs  Description: Monitor and assess patient's nutrition/hydration status for malnutrition. Collaborate with interdisciplinary team and initiate plan and interventions as ordered.  Monitor patient's weight and dietary intake as ordered or per policy. Utilize nutrition screening tool and intervene as necessary. Determine patient's food preferences and provide high-protein, high-caloric foods as appropriate.     INTERVENTIONS:  - Monitor oral intake, urinary output, labs, and treatment plans  - Assess nutrition and hydration status and recommend course of action  - Evaluate amount of meals eaten  - Assist patient with eating if necessary   - Allow adequate time for meals  - Recommend/ encourage appropriate diets, oral nutritional supplements, and vitamin/mineral supplements  - Order, calculate, and assess calorie counts as needed  - Recommend, monitor, and adjust tube feedings and TPN/PPN based on assessed needs  - Assess need for intravenous fluids  - Provide specific nutrition/hydration education as appropriate  - Include patient/family/caregiver in decisions related to nutrition  Outcome: Progressing     Problem: Prexisting or High Potential for Compromised Skin Integrity  Goal: Skin integrity is maintained or improved  Description: INTERVENTIONS:  - Identify patients at risk for skin breakdown  - Assess and monitor skin integrity  - Assess and monitor nutrition and hydration status  - Monitor labs   - Assess for incontinence   - Turn and reposition patient  - Assist with mobility/ambulation  - Relieve pressure over bony prominences  - Avoid friction and shearing  - Provide appropriate hygiene as needed including keeping  skin clean and dry  - Evaluate need for skin moisturizer/barrier cream  - Collaborate with interdisciplinary team   - Patient/family teaching  - Consider wound care consult   Outcome: Progressing        Problem: METABOLIC, FLUID AND ELECTROLYTES - ADULT  Goal: Electrolytes maintained within normal limits  Description: INTERVENTIONS:  - Monitor labs and assess patient for signs and symptoms of electrolyte imbalances  - Administer electrolyte replacement as ordered  - Monitor response to electrolyte replacements, including repeat lab results as appropriate  - Instruct patient on fluid and nutrition as appropriate  Outcome: Progressing  Goal: Fluid balance maintained  Description: INTERVENTIONS:  - Monitor labs   - Monitor I/O and WT  - Instruct patient on fluid and nutrition as appropriate  - Assess for signs & symptoms of volume excess or deficit  Outcome: Progressing  Goal: Glucose maintained within target range  Description: INTERVENTIONS:  - Monitor Blood Glucose as ordered  - Assess for signs and symptoms of hyperglycemia and hypoglycemia  - Administer ordered medications to maintain glucose within target range  - Assess nutritional intake and initiate nutrition service referral as needed  Outcome: Progressing

## 2024-11-25 NOTE — CASE MANAGEMENT
Case Management Discharge Planning Note    [facility]  [facility] [id number]  : 1960 Date 2024       Current Admission Date: 2024  Current Admission Diagnosis:Seizure (HCC)   Patient Active Problem List    Diagnosis Date Noted Date Diagnosed    Abnormal EKG 11/15/2024     Elevated troponin level not due myocardial infarction 11/15/2024     Bacteriuria 11/15/2024     Shock (HCC) 11/15/2024     Acute hypoxic respiratory failure (HCC) 11/15/2024     Seizure (HCC) 2024     Diabetic nephropathy associated with type 2 diabetes mellitus (HCC) 2024     Coagulopathy (HCC) 2024     Type 2 diabetes mellitus with hyperglycemia, unspecified whether long term insulin use (HCC) 2024     Chronic anticoagulation 2023     Chronic respiratory failure with hypercapnia ([facility]) 2023     CKD (chronic kidney disease) 2023     Anemia in stage 3b chronic kidney disease  (HCC) 2023     Chronic respiratory failure with hypoxia (HCC) 2023     History of hysterectomy 2022     Panniculitis 2022     Abnormal CT scan 2022     Panniculus 2022     Bilateral pleural effusion 2022     Cardiomegaly 2022     Depression, recurrent (HCC) 2022     Pre-ulcerative corn or callous 2022     Morbid obesity with BMI of 50.0-59.9, adult (HCC) 2021     Secondary hyperparathyroidism of renal origin (HCC) 2021     GERD (gastroesophageal reflux disease) 2020     Anxiety 2020     Chronic constipation 2020     Paroxysmal atrial fibrillation (HCC) 2020     COPD with asthma (HCC) 2020     Hypothyroid 2020     Cellulitis 09/10/2020     H/O right nephrectomy 09/10/2020     Hyperparathyroidism (HCC) 2020     Dyslipidemia 2019     Hypertensive chronic kidney disease with stage 1 through stage 4 chronic kidney disease, or unspecified chronic kidney disease  10/04/2018     Persistent proteinuria 10/04/2018     Iron deficiency 10/04/2018       LOS (days): 13  Geometric Mean LOS (GMLOS) (days): 4.4  Days to GMLOS:-8.2     OBJECTIVE:  Risk of Unplanned Readmission Score: 26.45         Current admission status: Inpatient   Preferred Pharmacy:   [facility]/pharmacy #[id number] - [place] - [address]  [address] [address]  Phone: [phone] Fax: [phone]    [facility] [place], [place] - [facility]  [address]  [address]  [place] [address]  Phone: [phone] Fax: [phone]    Primary Care Provider: TULIO Beaver    Primary Insurance: [facility]  Secondary Insurance: [facility]    DISCHARGE DETAILS:           Other Referral/Resources/Interventions Provided:  Referral Comments: Per chart review/MICU rounds: plan for GOC meeting w/family today.  Caregiver does not want trach.  CM to follow for dcp needs pending medical course.

## 2024-11-25 NOTE — PROGRESS NOTES
Progress Note - Critical Care/ICU   Name: Adelina Soto 64 y.o. female I MRN: [id number]  [facility] I Date of Admission: 11/12/2024   Date of Service: 11/25/2024 I Hospital Day: 13       Summary:  Patient is a 64 year old female with history of Afib on Warfarin, HTN, T2DM, morbid obesity, HLD, hypothyroidism, initially presented to [facility] ([place] on 11/12 after being found by her partner at home with AMS, seizure-like activity. EMS witnessed tonic-clonic seizure-like activity, treated with 6 mg of Versed. 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 Was subsequently intubated for airway protection and transferred to \[facility]". Initial LP generally unremarkable apart from RBC's 300's from otherwise atraumatic tap, negative ME panel and HSV. 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      [facility], during evaluation pressors were weaned and AEDs adjusted. Underwent CT head which appeared unchanged, LP on 11/19 with fluoro unsuccessful. Off video EEG 11/20, repeat MRI showed no acute findings. Minimal improvements in neurological exam thus far, does open eyes to voice and blink on command. Ongoing discussion regarding goals of care, tracheostomy/feeding tube in LTAC versus comfort care, POA thus far remains treatment oriented. Tentatively planned for family meeting 11/25.    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, started on acyclovir  Neurology following, vEEG initially showing left frontal seizures, burst suppressed initially, now on Keppra 1g q12h, Phenytoin 100 mg q8h, Depacon 375 mg q6h  LP showed glucose 89, protein 74,  339 RBC's, 2 WBC's, ME panel negative  ID following, recommend continuing acyclovir 10 mg/kg every 12 hours for time being  Off vEEG 11/20  Repeat MRI 11/20 unremarkable     Diagnosis: H/o depression  Plan:  Continue home Zoloft     CV:   Diagnosis: Shock/hypotension, likely in setting of sedation  Plan:  MAP 65-70, off pressors     Diagnosis: Elevated troponin  54 on arrival, peaked at 1887 on 11/13  ECG without ST changes  Likely demand related due to RVR and/or hypotension as above  Plan:  Monitor telemetry, rate control < 110 bpm     Diagnosis: H/o Afib on Warfarin  Plan:  Lopressor 50 mg PO BID  AC with Lovenox 1 mg/kg BID  Consider amiodarone for rate control if persistently > 110 bpm     Pulm:  Diagnosis: Acute hypoxic respiratory failure  Intubated on arrival for airway protection due to AMS  Vent settings: SCMV, 10/420/8/40%  Bronch 11/12 with generalized edematous friable mucosa and moderate thick purulent secretions, culture with mixed respiratory viktoriya  Initial chest x-ray on arrival with mild edema more so on the right, left-sided whiteout likely from mucous plugging and atelectasis, given improvement after bronchoscopy, now continues to have crackles bilaterally  Repeat bronchoscopy 11/20 generally unremarkable     GI:   Diagnosis: GERD  Plan:  Continue home pepcid     :   Diagnosis: CKD  Baseline Cr 1.4-1.8  Remains baseline throughout hospital stay  Monitor renal fx, monitor I/O's, avoid nephrotoxins     F/E/N:   F - none  E - K > 4, Mg > 2, Phos > 3  N - TF with Vital 1.2 @ 55/hr     Heme/Onc:   Diagnosis: Anemia  Hgb 9-10 this admission  Stable thus far, transfuse if < 7     Endo:   Diagnosis: Hypothyroidism  TSH 0.29, T4 1.23  Continue home levothyroxine     Diagnosis: T2DM  Diet controlled, not on medications outpatient  Monitor BG for goal 140-180  Add SSI if needed     ID:   Diagnosis: Possible encephalitis  Initial MRI brain w/ c/f HSV encephalitis  LP and ME panel neg  ID following,  continue Acyclovir 10 mg/kg q12h for 2 weeks total  11/22 low grade fever, tachycardic and hypotensive with MAP to 50's  Repeat infectious w/u if repeatedly elevated temp     Diagnosis: UTI, possible pneumonia  Urine culture growing E. coli and ESBL proteus  Earlier bronchoscopy with copious purulent secretions, however culture with mixed respiratory viktoriya  Not treated earlier in admission given lack of fever, improvement in leukocytosis     MSK/Skin:   Diagnosis: Pressure wounds  Continue wound care    Disposition: Critical care    ICU Core Measures     Vented Patient  VAP Bundle  VAP bundle ordered     A: Assess, Prevent, and Manage Pain Has pain been assessed? Yes  Need for changes to pain regimen? No   B: Both Spontaneous Awakening Trials (SATs) and Spontaneous Breathing Trials (SBTs) Plan to perform spontaneous awakening trial today? Yes   Plan to perform spontaneous breathing trial today? Yes   Obvious barriers to extubation? Yes   C: Choice of Sedation RASS Goal: 0 Alert and Calm  Need for changes to sedation or analgesia regimen? No   D: Delirium CAM-ICU: Unable to perform secondary to Acute cognitive dysfunction   E: Early Mobility  Plan for early mobility? NA   F: Family Engagement Plan for family engagement today? Yes       Antibiotic Review: Patient on appropriate coverage based on culture data.     Review of Invasive Devices:    Stringer Plan: Continue for accurate I/O monitoring for 48 hours        Prophylaxis:  VTE VTE covered by:  enoxaparin, Subcutaneous, 120 mg at 11/24/24 2043       Stress Ulcer  covered byfamotidine (PEPCID) 20 mg tablet [[id number]] (Long-Term Med), famotidine (PEPCID) tablet 20 mg [[id number]], pantoprazole (PROTONIX) 40 mg tablet [[id number]] (Long-Term Med)         24 Hour Events : Persistently tachycardic, Afib with RVR to 120's - met hold parameters for Metoprolol due to hypotension. Treated with additional 1L IVF bolus, albumin and low dose Fentanyl for agitation. Pending goals  [facility] meeting in the afternoon.    Subjective   Review of Systems: Review of Systems not obtainable due to Clinical Condition, Altered mental status    Objective :                   Vitals I/O      Most Recent Min/Max in 24hrs   Temp 100 °F (37.8 °C) Temp  Min: 98.4 °F (36.9 °C)  Max: 100.6 °F (38.1 °C)   Pulse (!) 115 Pulse  Min: 104  Max: 134   Resp 19 Resp  Min: 14  Max: 27   BP 94/50 BP  Min: 83/50  Max: 109/56   O2 Sat 98 % SpO2  Min: 91 %  Max: 98 %      Intake/Output Summary (Last 24 hours) at 11/25/2024 0710  Last data filed at 11/25/2024 0600  Gross per 24 hour   Intake 4547 ml   Output 2030 ml   Net 2517 ml       Diet Enteral/Parenteral; Tube Feeding No Oral Diet; Vital AF 1.2; Cyclic; 55; 22 hours; 200; Every 4 hours    Invasive Monitoring           Physical Exam   Physical Exam  Vitals and nursing note reviewed.   Eyes:      Comments: Pupils were equal, sluggish but responsive to light   HENT:      Head: Normocephalic and atraumatic.   Cardiovascular:      Rate and Rhythm: Regular rhythm. Tachycardia present.   Abdominal:      Palpations: Abdomen is soft.      Tenderness: There is no abdominal tenderness.   Constitutional:       Appearance: She is ill-appearing.      Interventions: She is intubated and restrained. She is not sedated.  Pulmonary:      Effort: Pulmonary effort is normal. She is intubated.      Breath sounds: Rales present.   Neurological:      Comments: Opens eyes to voice, was able to blink on command.   Genitourinary/Anorectal:  Stringer present.        Diagnostic Studies        Lab Results: I have reviewed the following results:     Medications:  Scheduled PRN   acyclovir, 10 mg/kg (Adjusted), Q12H  atorvastatin, 10 mg, Daily With Dinner  cefTRIAXone, 1,000 mg, Q24H  chlorhexidine, 15 mL, Q12H LINO  enoxaparin, 1 mg/kg, Q12H LINO  famotidine, 20 mg, Daily  insulin lispro, 2-12 Units, Q6H LINO  levalbuterol, 1.25 mg, TID  levETIRAcetam, 750 mg, Q12H LION  levothyroxine, 100 mcg, Early  Morning  metoprolol tartrate, 50 mg, Q12H LINO  EARLINE ANTIFUNGAL, , BID  sertraline, 50 mg, Daily  thiamine, 100 mg, Daily  valproate sodium, 500 mg, Q6H LINO      acetaminophen, 1,000 mg, Q6H PRN  albuterol, 2.5 mg, Q6H PRN  LORazepam, 2 mg, Q4H PRN  ondansetron, 4 mg, Q4H PRN       Continuous          Labs:   CBC    Recent Labs     11/24/24  0541 11/25/24  0438   WBC 8.87 7.40   HGB 8.4* 7.9*   HCT 28.7* 26.3*    256     BMP    Recent Labs     11/24/24  0541 11/25/24  0427   SODIUM 141 138   K 5.6* 4.2    98   CO2 34* 31   AGAP 5 9   BUN 26* 24   CREATININE 1.31* 1.35*   CALCIUM 9.0 8.3*       Coags    No recent results       Additional Electrolytes  Recent Labs     11/24/24  0541 11/25/24  0427 11/25/24  0502   MG 2.0  --  1.8*   PHOS 3.7 3.1  --           Blood Gas    Recent Labs     11/25/24  0511   PHART 7.409   [id number] 53.5*   PO2ART 92.6   [id number] 33.1*   BEART 7.5   SOURCE Radial, Right     Recent Labs     11/25/24  0511   SOURCE Radial, Right    LFTs  No recent results      Infectious  No recent results  Glucose  Recent Labs     11/24/24  0541 11/25/24  0427   GLUC 113 199*

## 2024-11-25 NOTE — ASSESSMENT & PLAN NOTE
1 of 2 blood cultures collected 11/24 are growing staph epidermidis. Likely contaminant. Monitor off additional antibiotics

## 2024-11-26 LAB
ANION GAP SERPL CALCULATED.3IONS-SCNC: 7 MMOL/L (ref 4–13)
BUN SERPL-MCNC: 23 MG/DL (ref 5–25)
CALCIUM SERPL-MCNC: 9.3 MG/DL (ref 8.4–10.2)
CHLORIDE SERPL-SCNC: 102 MMOL/L (ref 96–108)
CO2 SERPL-SCNC: 34 MMOL/L (ref 21–32)
CREAT SERPL-MCNC: 1.32 MG/DL (ref 0.6–1.3)
ERYTHROCYTE [DISTWIDTH] IN BLOOD BY AUTOMATED COUNT: 18.8 % (ref 11.6–15.1)
GFR SERPL CREATININE-BSD FRML MDRD: 42 ML/MIN/1.73SQ M
GLUCOSE SERPL-MCNC: 101 MG/DL (ref 65–140)
GLUCOSE SERPL-MCNC: 108 MG/DL (ref 65–140)
GLUCOSE SERPL-MCNC: 112 MG/DL (ref 65–140)
GLUCOSE SERPL-MCNC: 91 MG/DL (ref 65–140)
GLUCOSE SERPL-MCNC: 99 MG/DL (ref 65–140)
HCT VFR BLD AUTO: 27.3 % (ref 34.8–46.1)
HCT VFR BLD AUTO: 29 % (ref 34.8–46.1)
HGB BLD-MCNC: 8.2 G/DL (ref 11.5–15.4)
HGB BLD-MCNC: 8.8 G/DL (ref 11.5–15.4)
MCH RBC QN AUTO: 33.6 PG (ref 26.8–34.3)
MCHC RBC AUTO-ENTMCNC: 30.3 G/DL (ref 31.4–37.4)
MCV RBC AUTO: 111 FL (ref 82–98)
NRBC BLD AUTO-RTO: 1 /100 WBCS
PHOSPHATE SERPL-MCNC: 3.5 MG/DL (ref 2.3–4.1)
PLATELET # BLD AUTO: 300 THOUSANDS/UL (ref 149–390)
PMV BLD AUTO: 9.3 FL (ref 8.9–12.7)
POTASSIUM SERPL-SCNC: 4.2 MMOL/L (ref 3.5–5.3)
RBC # BLD AUTO: 2.62 MILLION/UL (ref 3.81–5.12)
SODIUM SERPL-SCNC: 143 MMOL/L (ref 135–147)
VALPROATE FREE SERPL-MCNC: 39 UG/ML (ref 6–22)
WBC # BLD AUTO: 7.52 THOUSAND/UL (ref 4.31–10.16)

## 2024-11-26 PROCEDURE — 80048 BASIC METABOLIC PNL TOTAL CA: CPT

## 2024-11-26 PROCEDURE — 83735 ASSAY OF MAGNESIUM: CPT

## 2024-11-26 PROCEDURE — 94003 VENT MGMT INPAT SUBQ DAY: CPT

## 2024-11-26 PROCEDURE — 82948 REAGENT STRIP/BLOOD GLUCOSE: CPT

## 2024-11-26 PROCEDURE — 94760 N-INVAS EAR/PLS OXIMETRY 1: CPT

## 2024-11-26 PROCEDURE — 85014 HEMATOCRIT: CPT

## 2024-11-26 PROCEDURE — 85027 COMPLETE CBC AUTOMATED: CPT

## 2024-11-26 PROCEDURE — 94669 MECHANICAL CHEST WALL OSCILL: CPT

## 2024-11-26 PROCEDURE — 99232 SBSQ HOSP IP/OBS MODERATE 35: CPT | Performed by: STUDENT IN AN ORGANIZED HEALTH CARE EDUCATION/TRAINING PROGRAM

## 2024-11-26 PROCEDURE — 85018 HEMOGLOBIN: CPT

## 2024-11-26 PROCEDURE — 94640 AIRWAY INHALATION TREATMENT: CPT

## 2024-11-26 PROCEDURE — 94664 DEMO&/EVAL PT USE INHALER: CPT

## 2024-11-26 PROCEDURE — 99291 CRITICAL CARE FIRST HOUR: CPT | Performed by: INTERNAL MEDICINE

## 2024-11-26 PROCEDURE — 84100 ASSAY OF PHOSPHORUS: CPT

## 2024-11-26 RX ORDER — METOPROLOL TARTRATE 50 MG
50 TABLET ORAL ONCE
Status: COMPLETED | OUTPATIENT
Start: 2024-11-26 | End: 2024-11-26

## 2024-11-26 RX ORDER — FENTANYL CITRATE 50 UG/ML
25 INJECTION, SOLUTION INTRAMUSCULAR; INTRAVENOUS ONCE
Refills: 0 | Status: COMPLETED | OUTPATIENT
Start: 2024-11-26 | End: 2024-11-26

## 2024-11-26 RX ORDER — SODIUM CHLORIDE, SODIUM GLUCONATE, SODIUM ACETATE, POTASSIUM CHLORIDE, MAGNESIUM CHLORIDE, SODIUM PHOSPHATE, DIBASIC, AND POTASSIUM PHOSPHATE .53; .5; .37; .037; .03; .012; .00082 G/100ML; G/100ML; G/100ML; G/100ML; G/100ML; G/100ML; G/100ML
1000 INJECTION, SOLUTION INTRAVENOUS ONCE
Status: COMPLETED | OUTPATIENT
Start: 2024-11-26 | End: 2024-11-27

## 2024-11-26 RX ORDER — METOPROLOL TARTRATE 50 MG
50 TABLET ORAL EVERY 8 HOURS
Status: DISCONTINUED | OUTPATIENT
Start: 2024-11-26 | End: 2024-11-30

## 2024-11-26 RX ADMIN — VALPROATE SODIUM 500 MG: 100 INJECTION, SOLUTION INTRAVENOUS at 21:21

## 2024-11-26 RX ADMIN — CHLORHEXIDINE GLUCONATE 0.12% ORAL RINSE 15 ML: 1.2 LIQUID ORAL at 09:12

## 2024-11-26 RX ADMIN — VALPROATE SODIUM 500 MG: 100 INJECTION, SOLUTION INTRAVENOUS at 09:12

## 2024-11-26 RX ADMIN — SERTRALINE HYDROCHLORIDE 50 MG: 50 TABLET ORAL at 08:47

## 2024-11-26 RX ADMIN — ENOXAPARIN SODIUM 120 MG: 120 INJECTION SUBCUTANEOUS at 21:23

## 2024-11-26 RX ADMIN — ACYCLOVIR SODIUM 775 MG: 50 INJECTION, SOLUTION INTRAVENOUS at 08:45

## 2024-11-26 RX ADMIN — LEVALBUTEROL HYDROCHLORIDE 1.25 MG: 1.25 SOLUTION RESPIRATORY (INHALATION) at 19:58

## 2024-11-26 RX ADMIN — ENOXAPARIN SODIUM 120 MG: 120 INJECTION SUBCUTANEOUS at 08:47

## 2024-11-26 RX ADMIN — VALPROATE SODIUM 500 MG: 100 INJECTION, SOLUTION INTRAVENOUS at 15:45

## 2024-11-26 RX ADMIN — SODIUM CHLORIDE, SODIUM GLUCONATE, SODIUM ACETATE, POTASSIUM CHLORIDE, MAGNESIUM CHLORIDE, SODIUM PHOSPHATE, DIBASIC, AND POTASSIUM PHOSPHATE 1000 ML: .53; .5; .37; .037; .03; .012; .00082 INJECTION, SOLUTION INTRAVENOUS at 23:12

## 2024-11-26 RX ADMIN — VALPROATE SODIUM 500 MG: 100 INJECTION, SOLUTION INTRAVENOUS at 04:50

## 2024-11-26 RX ADMIN — ATORVASTATIN CALCIUM 10 MG: 10 TABLET, FILM COATED ORAL at 15:45

## 2024-11-26 RX ADMIN — LEVETIRACETAM 750 MG: 750 TABLET, FILM COATED ORAL at 08:47

## 2024-11-26 RX ADMIN — MICONAZOLE NITRATE 1 APPLICATION: 20 CREAM TOPICAL at 17:40

## 2024-11-26 RX ADMIN — LEVALBUTEROL HYDROCHLORIDE 1.25 MG: 1.25 SOLUTION RESPIRATORY (INHALATION) at 13:26

## 2024-11-26 RX ADMIN — FENTANYL CITRATE 25 MCG: 50 INJECTION INTRAMUSCULAR; INTRAVENOUS at 06:30

## 2024-11-26 RX ADMIN — FAMOTIDINE 20 MG: 20 TABLET, FILM COATED ORAL at 08:47

## 2024-11-26 RX ADMIN — LEVALBUTEROL HYDROCHLORIDE 1.25 MG: 1.25 SOLUTION RESPIRATORY (INHALATION) at 07:15

## 2024-11-26 RX ADMIN — CHLORHEXIDINE GLUCONATE 0.12% ORAL RINSE 15 ML: 1.2 LIQUID ORAL at 21:23

## 2024-11-26 RX ADMIN — MICONAZOLE NITRATE: 20 CREAM TOPICAL at 08:46

## 2024-11-26 RX ADMIN — METOPROLOL TARTRATE 50 MG: 50 TABLET, FILM COATED ORAL at 08:05

## 2024-11-26 RX ADMIN — ACYCLOVIR SODIUM 775 MG: 50 INJECTION, SOLUTION INTRAVENOUS at 21:21

## 2024-11-26 RX ADMIN — LEVETIRACETAM 750 MG: 750 TABLET, FILM COATED ORAL at 21:23

## 2024-11-26 RX ADMIN — THIAMINE HCL TAB 100 MG 100 MG: 100 TAB at 08:47

## 2024-11-26 RX ADMIN — LEVOTHYROXINE SODIUM 100 MCG: 100 TABLET ORAL at 06:00

## 2024-11-26 RX ADMIN — METOPROLOL TARTRATE 50 MG: 50 TABLET, FILM COATED ORAL at 21:59

## 2024-11-26 NOTE — ASSESSMENT & PLAN NOTE
Lab Results   Component Value Date    EGFR 42 11/26/2024    EGFR 41 11/25/2024    EGFR 42 11/24/2024    CREATININE 1.32 (H) 11/26/2024    CREATININE 1.35 (H) 11/25/2024    CREATININE 1.31 (H) 11/24/2024   Baseline creatinine 1.7-1.9.  Creatinine stable, slightly below baseline.  Dose adjust antibiotics as needed for creatinine clearance.  Monitor creatinine daily

## 2024-11-26 NOTE — ASSESSMENT & PLAN NOTE
Nasal Septum Repair Surgery: What to Expect at [address] [place] [address] [place] may have some swelling of your nose, upper lip, cheeks, or around your eyes after nasal surgery. You may have some bruises around your nose and eyes. Your nose may be sore and will bleed. This may last for several days after surgery. The tip of your nose and your upper lip and gums may be numb. Feeling will return in a few weeks to a few months. Your sense of smell may not be as good after surgery. But it will improve and will often return to normal in 1 to 2 months. You will have a drip pad under your nose to collect mucus and blood. Change it only when it bleeds through. You may have to do this every hour for 24 hours after surgery. You will probably be able to return to work or school in a few days and to your normal routine in about 3 weeks. But this varies with your job and how much surgery you had. Most people recover fully in 1 to 2 months. You will have to visit your doctor during the 3 to 4 months after your surgery. Your doctor will check to see that your nose is healing well. This care sheet gives you a general idea about how long it will take for you to recover. But each person recovers at a different pace. Follow the steps below to get better as quickly as possible. How can you care for yourself at home? Activity    Rest when you feel tired. Getting enough sleep will help you recover. Do not lie flat. Raise your head with two or three pillows. This can reduce swelling. Try to sleep on your back for the month after surgery. You can also sleep in a reclining chair. Try to walk each day. Start by walking a little more than you did the day before. Bit by bit, increase the amount you walk. Walking boosts blood flow and helps prevent pneumonia and constipation. Also, try to sit and stand as much as you can. For 1 week, try not to bend over or lift anything heavier than 10 pounds.  This may include a child, heavy grocery Patient intubated for airway protection.  Status post bronchoscopy 11/13 for mucous plugging and thick left-sided secretions seen.  Chest x-ray not consistent with pneumonia.  BAL culture shows growth of mixed respiratory viktoriya.  Chest x-ray with bilateral airspace opacities but no clinical evidence of pneumonia, patient has been on stable ventilator settings without significant secretions.  No clinical evidence of pneumonia at this time.   -Ventilator management per critical care team   -No additional antibiotics indicated.

## 2024-11-26 NOTE — PROGRESS NOTES
Progress Note - Critical Care/ICU   Name: Adelina Soto 64 y.o. female I MRN: [id number]  [facility] Date of Admission: 11/12/2024   Date of Service: 11/26/2024 I Hospital Day: 14       Summary:  Patient is a 64 year old female with history of Afib on Warfarin, HTN, T2DM, morbid obesity, HLD, hypothyroidism, initially presented to [facility] on 11/12 after being found by her partner at home with AMS, seizure-like activity. EMS witnessed tonic-clonic seizure-like activity, treated with 6 mg of Versed. 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 Was subsequently intubated for airway protection and transferred to [facility]. Initial LP generally unremarkable apart from RBC's 300's from otherwise atraumatic tap, negative ME panel and HSV. 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      [facility], during evaluation pressors were weaned and AEDs adjusted. Underwent CT head which appeared unchanged, LP on 11/19 with fluoro unsuccessful. Off video EEG 11/20, repeat MRI showed no acute findings. Minimal improvements in neurological exam thus far, does open eyes to voice and blink on command. Ongoing discussion regarding goals of care, tracheostomy/feeding tube in LTAC versus comfort care, POA thus far remains treatment oriented. Tentatively planned for family meeting 11/25.    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, started on acyclovir  Neurology following, vEEG initially showing left frontal seizures, burst suppressed initially, now on Keppra 1g q12h, Phenytoin 100 mg q8h, Depacon 375 mg q6h  LP showed glucose 89, protein 74,  339 RBC's, 2 WBC's, ME panel negative  ID following, completed acyclovir 10 mg/kg every 12 hours through 11/26.  Off vEEG 11/20  Repeat MRI 11/20 unremarkable  More agitation, getting PRN Fentanyl doses     Diagnosis: H/o depression  Plan:  Continue home Zoloft     CV:   Diagnosis: Shock/hypotension, likely in setting of sedation - resolved  Plan:  MAP 65-70, off pressors     Diagnosis: Elevated troponin  54 on arrival, peaked at 1887 on 11/13  ECG without ST changes  Likely demand related due to RVR and/or hypotension as above  Plan:  Monitor telemetry, rate control < 110 bpm     Diagnosis: H/o Afib on Warfarin  Plan:  Lopressor 50 mg PO BID  AC with Lovenox 1 mg/kg BID  Consider amiodarone for rate control if persistently > 110 bpm     Pulm:  Diagnosis: Acute hypoxic respiratory failure  Intubated on arrival for airway protection due to AMS  Vent settings: SCMV, 10/420/8/40%  Bronch 11/12 with generalized edematous friable mucosa and moderate thick purulent secretions, culture with mixed respiratory viktoriya  Initial chest x-ray on arrival with mild edema more so on the right, left-sided whiteout likely from mucous plugging and atelectasis, given improvement after bronchoscopy, now continues to have crackles bilaterally  Repeat bronchoscopy 11/20 generally unremarkable  SAT/SBT     GI:   Diagnosis: GERD  Plan:  Continue home pepcid     :   Diagnosis: CKD  Baseline Cr 1.4-1.8  Remains baseline throughout hospital stay  Monitor renal fx, monitor I/O's, avoid nephrotoxins     F/E/N:   F - none  E - K > 4, Mg > 2, Phos > 3  N - TF with Vital 1.2 @ 55/hr     Heme/Onc:   Diagnosis: Anemia  Hgb 9-10 this admission  Stable thus far, transfuse if < 7     Endo:   Diagnosis: Hypothyroidism  TSH 0.29, T4 1.23  Continue home levothyroxine     Diagnosis: T2DM  Diet controlled, not on medications outpatient  Monitor BG for goal 140-180  Add SSI if needed     ID:   Diagnosis: Possible encephalitis  Initial MRI brain w/ c/f HSV  encephalitis  LP and ME panel neg  ID following, Acyclovir 10 mg/kg q12h through 11/26 11/22 low grade fever, tachycardic and hypotensive with MAP to 50's  Repeat infectious w/u if repeatedly elevated temp    Diagnosis: UTI, possible pneumonia  Urine culture growing E. coli and ESBL proteus  Earlier bronchoscopy with copious purulent secretions, however culture with mixed respiratory viktoriya  Not treated earlier in admission given lack of fever, improvement in leukocytosis     MSK/Skin:   Diagnosis: Pressure wounds  Continue wound care    Disposition: Critical care    ICU Core Measures     Vented Patient  VAP Bundle  VAP bundle ordered     A: Assess, Prevent, and Manage Pain Has pain been assessed? Yes  Need for changes to pain regimen? No   B: Both Spontaneous Awakening Trials (SATs) and Spontaneous Breathing Trials (SBTs) Plan to perform spontaneous awakening trial today? Yes   Plan to perform spontaneous breathing trial today? Yes   Obvious barriers to extubation? Yes   C: Choice of Sedation RASS Goal: 0 Alert and Calm  Need for changes to sedation or analgesia regimen? No   D: Delirium CAM-ICU: Unable to perform secondary to Acute cognitive dysfunction   E: Early Mobility  Plan for early mobility? NA   F: Family Engagement Plan for family engagement today? Yes       Antibiotic Review: Patient on appropriate coverage based on culture data.     Review of Invasive Devices:    Stringer Plan: Continue for accurate I/O monitoring for 48 hours        Prophylaxis:  VTE VTE covered by:  enoxaparin, Subcutaneous, 120 mg at 11/25/24 2106       Stress Ulcer  covered byfamotidine (PEPCID) 20 mg tablet [[id number]] (Long-Term Med), famotidine (PEPCID) tablet 20 mg [[id number]], pantoprazole (PROTONIX) 40 mg tablet [[id number]] (Long-Term Med)         24 Hour Events : GOC meeting yesterday; no tracheostomy and POA wishes patient to return home, would not wish for her to be in a nursing home / LTAC. Overnight more awake and agitated,  received PRN Fentanyl doses. Otherwise hemodynamically stable.    Subjective   Review of Systems: Review of Systems not obtainable due to Clinical Condition, Altered mental status    Objective :                   Vitals I/O      Most Recent Min/Max in 24hrs   Temp 99.5 °F (37.5 °C) Temp  Min: 97.7 °F (36.5 °C)  Max: 99.9 °F (37.7 °C)   Pulse (!) 113 Pulse  Min: 100  Max: 125   Resp 15 Resp  Min: 14  Max: 24   /80 BP  Min: 90/54  Max: 138/63   O2 Sat 99 % SpO2  Min: 92 %  Max: 100 %      Intake/Output Summary (Last 24 hours) at 2024 0656  Last data filed at 2024 0606  Gross per 24 hour   Intake 1504.96 ml   Output 3915 ml   Net -2410.04 ml       Diet Enteral/Parenteral; Tube Feeding No Oral Diet; Vital AF 1.2; Cyclic; 55; 22 hours; 200; Every 4 hours    Invasive Monitoring           Physical Exam   Physical Exam  Vitals and nursing note reviewed.   Eyes:      Comments: Pupils were equal, sluggish but responsive to light   HENT:      Head: Normocephalic and atraumatic.   Cardiovascular:      Rate and Rhythm: Regular rhythm. Tachycardia present.   Musculoskeletal:      Right lower le+ Edema present.      Left lower le+ Edema present.   Abdominal:      Palpations: Abdomen is soft.      Tenderness: There is no abdominal tenderness.   Constitutional:       Appearance: She is ill-appearing.      Interventions: She is intubated and restrained. She is not sedated.  Pulmonary:      Effort: Pulmonary effort is normal. She is intubated.   Neurological:      Comments: Opens eyes to voice, able to nod/shake head in response to questions, some movement of all 4 extremities, able to lift head off bed minimally.   Genitourinary/Anorectal:  Stringer present.        Diagnostic Studies        Lab Results: I have reviewed the following results:     Medications:  Scheduled PRN   acyclovir, 10 mg/kg (Adjusted), Q12H  atorvastatin, 10 mg, Daily With Dinner  chlorhexidine, 15 mL, Q12H LINO  enoxaparin, 1 mg/kg, Q12H  LINO  famotidine, 20 mg, Daily  insulin lispro, 2-12 Units, Q6H LINO  levalbuterol, 1.25 mg, TID  levETIRAcetam, 750 mg, Q12H LINO  levothyroxine, 100 mcg, Early Morning  metoprolol tartrate, 50 mg, Q12H LINO  EARLINE ANTIFUNGAL, , BID  sertraline, 50 mg, Daily  thiamine, 100 mg, Daily  valproate sodium, 500 mg, Q6H LINO      acetaminophen, 1,000 mg, Q6H PRN  albuterol, 2.5 mg, Q6H PRN  LORazepam, 2 mg, Q4H PRN  ondansetron, 4 mg, Q4H PRN       Continuous          Labs:   CBC    Recent Labs     11/25/24  0733 11/26/24  0454   WBC 7.40 7.52   HGB 7.9* 8.8*   HCT 26.3* 29.0*    300   BANDSPCT 1  --      BMP    Recent Labs     11/25/24 0427 11/26/24  0454   SODIUM 138 143   K 4.2 4.2   CL 98 102   CO2 31 34*   AGAP 9 7   BUN 24 23   CREATININE 1.35* 1.32*   CALCIUM 8.3* 9.3       Coags    No recent results       Additional Electrolytes  Recent Labs     11/25/24 0427 11/25/24  0502 11/26/24  0454   MG  --  1.8*  --    PHOS 3.1  --  3.5          Blood Gas    Recent Labs     11/25/24  0511   PHART 7.409   [id number] 53.5*   PO2ART 92.6   [id number] 33.1*   BEART 7.5   SOURCE Radial, Right     Recent Labs     11/25/24  0511   SOURCE Radial, Right    LFTs  No recent results      Infectious  No recent results  Glucose  Recent Labs     11/25/24 0427 11/26/24  0454   GLUC 199* 108

## 2024-11-26 NOTE — RESPIRATORY THERAPY NOTE
RT Ventilator Management Note  Adelina Soto 64 y.o. female MRN: [id number]  Unit/Bed#: [facility] 01 Encounter: [id number]      Daily Screen         11/24/2024 1923 11/25/2024  0745          Patient safety screen outcome:: Failed Failed      Not Ready for Weaning due to:: Underline problem not resolved Underline problem not resolved                Physical Exam:   Assessment Type: Assess only  General Appearance: Sleeping  Respiratory Pattern: Assisted  Chest Assessment: Chest expansion symmetrical  Bilateral Breath Sounds: Diminished, Coarse, Rhonchi, Expiratory wheezes  Cough: Productive  Suction: ET Tube  O2 Device: Ventilator      Resp Comments: Pt. remains on CMV/VC mode.  No changes throughout the night.  Will continue to monitor pt per protocol.

## 2024-11-26 NOTE — PROGRESS NOTES
Nutrition Follow-Up/Recommendations:    Current EN regimen (Vital 1.2 AF @55mL/hr x22 hrs/day, hold for 1 hour before and 1 hour after levothyroxine administration) remains appropriate to meet estimated calorie/protein needs at this time.     Adjustments in additional free water flushes per critical care.     Noted that rectal tube has been replaced, continue to monitor GI function/stool output. Consider adding banana flakes if loose stools persist, recommended dose with TF is one packet TID.     Will continue to follow plan of care/goals of care

## 2024-11-26 NOTE — PLAN OF CARE
Problem: PAIN - ADULT  Goal: Verbalizes/displays adequate comfort level or baseline comfort level  Description: Interventions:  - Encourage patient to monitor pain and request assistance  - Assess pain using appropriate pain scale  - Administer analgesics based on type and severity of pain and evaluate response  - Implement non-pharmacological measures as appropriate and evaluate response  - Consider cultural and social influences on pain and pain management  - Notify physician/advanced practitioner if interventions unsuccessful or patient reports new pain  Outcome: Progressing     Problem: INFECTION - ADULT  Goal: Absence or prevention of progression during hospitalization  Description: INTERVENTIONS:  - Assess and monitor for signs and symptoms of infection  - Monitor lab/diagnostic results  - Monitor all insertion sites, i.e. indwelling lines, tubes, and drains  - Monitor endotracheal if appropriate and nasal secretions for changes in amount and color  - [place] appropriate cooling/warming therapies per order  - Administer medications as ordered  - Instruct and encourage patient and family to use good hand hygiene technique  - Identify and instruct in appropriate isolation precautions for identified infection/condition  Outcome: Progressing  Goal: Absence of fever/infection during neutropenic period  Description: INTERVENTIONS:  - Monitor WBC    Outcome: Progressing     Problem: DISCHARGE PLANNING  Goal: Discharge to home or other facility with appropriate resources  Description: INTERVENTIONS:  - Identify barriers to discharge w/patient and caregiver  - Arrange for needed discharge resources and transportation as appropriate  - Identify discharge learning needs (meds, wound care, etc.)  - Arrange for interpretive services to assist at discharge as needed  - Refer to Case Management Department for coordinating discharge planning if the patient needs post-hospital services based on physician/advanced  practitioner order or complex needs related to functional status, cognitive ability, or social support system  Outcome: Progressing     Problem: Knowledge Deficit  Goal: Patient/family/caregiver demonstrates understanding of disease process, treatment plan, medications, and discharge instructions  Description: Complete learning assessment and assess knowledge base.  Interventions:  - Provide teaching at level of understanding  - Provide teaching via preferred learning methods  Outcome: Progressing     Problem: NEUROSENSORY - ADULT  Goal: Achieves stable or improved neurological status  Description: INTERVENTIONS  - Monitor and report changes in neurological status  - Monitor vital signs such as temperature, blood pressure, glucose, and any other labs ordered   - Initiate measures to prevent increased intracranial pressure  - Monitor for seizure activity and implement precautions if appropriate      Outcome: Progressing  Goal: Remains free of injury related to seizures activity  Description: INTERVENTIONS  - Maintain airway, patient safety  and administer oxygen as ordered  - Monitor patient for seizure activity, document and report duration and description of seizure to physician/advanced practitioner  - If seizure occurs,  ensure patient safety during seizure  - Reorient patient post seizure  - Seizure pads on all 4 side rails  - Instruct patient/family to notify RN of any seizure activity including if an aura is experienced  - Instruct patient/family to call for assistance with activity based on nursing assessment  - Administer anti-seizure medications if ordered    Outcome: Progressing  Goal: Achieves maximal functionality and self care  Description: INTERVENTIONS  - Monitor swallowing and airway patency with patient fatigue and changes in neurological status  - Encourage and assist patient to increase activity and self care.   - Encourage visually impaired, hearing impaired and aphasic patients to use  assistive/communication devices  Outcome: Progressing     Problem: CARDIOVASCULAR - ADULT  Goal: Maintains optimal cardiac output and hemodynamic stability  Description: INTERVENTIONS:  - Monitor I/O, vital signs and rhythm  - Monitor for S/S and trends of decreased cardiac output  - Administer and titrate ordered vasoactive medications to optimize hemodynamic stability  - Assess quality of pulses, skin color and temperature  - Assess for signs of decreased coronary artery perfusion  - Instruct patient to report change in severity of symptoms  Outcome: Progressing  Goal: Absence of cardiac dysrhythmias or at baseline rhythm  Description: INTERVENTIONS:  - Continuous cardiac monitoring, vital signs, obtain 12 lead EKG if ordered  - Administer antiarrhythmic and heart rate control medications as ordered  - Monitor electrolytes and administer replacement therapy as ordered  Outcome: Progressing     Problem: RESPIRATORY - ADULT  Goal: Achieves optimal ventilation and oxygenation  Description: INTERVENTIONS:  - Assess for changes in respiratory status  - Assess for changes in mentation and behavior  - Position to facilitate oxygenation and minimize respiratory effort  - Oxygen administered by appropriate delivery if ordered  - Initiate smoking cessation education as indicated  - Encourage broncho-pulmonary hygiene including cough, deep breathe, Incentive Spirometry  - Assess the need for suctioning and aspirate as needed  - Assess and instruct to report SOB or any respiratory difficulty  - Respiratory Therapy support as indicated  Outcome: Progressing     Problem: GASTROINTESTINAL - ADULT  Goal: Minimal or absence of nausea and/or vomiting  Description: INTERVENTIONS:  - Administer IV fluids if ordered to ensure adequate hydration  - Maintain NPO status until nausea and vomiting are resolved  - Nasogastric tube if ordered  - Administer ordered antiemetic medications as needed  - Provide nonpharmacologic comfort measures  as appropriate  - Advance diet as tolerated, if ordered  - Consider nutrition services referral to assist patient with adequate nutrition and appropriate food choices  Outcome: Progressing  Goal: Maintains or returns to baseline bowel function  Description: INTERVENTIONS:  - Assess bowel function  - Encourage oral fluids to ensure adequate hydration  - Administer IV fluids if ordered to ensure adequate hydration  - Administer ordered medications as needed  - Encourage mobilization and activity  - Consider nutritional services referral to assist patient with adequate nutrition and appropriate food choices  Outcome: Progressing  Goal: Maintains adequate nutritional intake  Description: INTERVENTIONS:  - Monitor percentage of each meal consumed  - Identify factors contributing to decreased intake, treat as appropriate  - Assist with meals as needed  - Monitor I&O, weight, and lab values if indicated  - Obtain nutrition services referral as needed  Outcome: Progressing  Goal: Establish and maintain optimal ostomy function  Description: INTERVENTIONS:  - Assess bowel function  - Encourage oral fluids to ensure adequate hydration  - Administer IV fluids if ordered to ensure adequate hydration   - Administer ordered medications as needed  - Encourage mobilization and activity  - Nutrition services referral to assist patient with appropriate food choices  - Assess stoma site  - Consider wound care consult   Outcome: Progressing  Goal: Oral mucous membranes remain intact  Description: INTERVENTIONS  - Assess oral mucosa and hygiene practices  - Implement preventative oral hygiene regimen  - Implement oral medicated treatments as ordered  - Initiate Nutrition services referral as needed  Outcome: Progressing     Problem: GENITOURINARY - ADULT  Goal: Maintains or returns to baseline urinary function  Description: INTERVENTIONS:  - Assess urinary function  - Encourage oral fluids to ensure adequate hydration if ordered  -  Administer IV fluids as ordered to ensure adequate hydration  - Administer ordered medications as needed  - Offer frequent toileting  - Follow urinary retention protocol if ordered  Outcome: Progressing  Goal: Absence of urinary retention  Description: INTERVENTIONS:  - Assess patient’s ability to void and empty bladder  - Monitor I/O  - Bladder scan as needed  - Discuss with physician/AP medications to alleviate retention as needed  - Discuss catheterization for long term situations as appropriate  Outcome: Progressing  Goal: Urinary catheter remains patent  Description: INTERVENTIONS:  - Assess patency of urinary catheter  - If patient has a chronic peace, consider changing catheter if non-functioning  - Follow guidelines for intermittent irrigation of non-functioning urinary catheter  Outcome: Progressing     Problem: METABOLIC, FLUID AND ELECTROLYTES - ADULT  Goal: Electrolytes maintained within normal limits  Description: INTERVENTIONS:  - Monitor labs and assess patient for signs and symptoms of electrolyte imbalances  - Administer electrolyte replacement as ordered  - Monitor response to electrolyte replacements, including repeat lab results as appropriate  - Instruct patient on fluid and nutrition as appropriate  Outcome: Progressing  Goal: Fluid balance maintained  Description: INTERVENTIONS:  - Monitor labs   - Monitor I/O and WT  - Instruct patient on fluid and nutrition as appropriate  - Assess for signs & symptoms of volume excess or deficit  Outcome: Progressing  Goal: Glucose maintained within target range  Description: INTERVENTIONS:  - Monitor Blood Glucose as ordered  - Assess for signs and symptoms of hyperglycemia and hypoglycemia  - Administer ordered medications to maintain glucose within target range  - Assess nutritional intake and initiate nutrition service referral as needed  Outcome: Progressing     Problem: SKIN/TISSUE INTEGRITY - ADULT  Goal: Skin Integrity remains intact(Skin Breakdown  Prevention)  Goal: Incision(s), wounds(s) or drain site(s) healing without S/S of infection  Description: INTERVENTIONS  - Assess and document dressing, incision, wound bed, drain sites and surrounding tissue  - Provide patient and family education  - Perform skin care/dressing changes every day  Outcome: Progressing  Goal: Pressure injury heals and does not worsen  Description: Interventions:  - Implement low air loss mattress or specialty surface (Criteria met)  - Apply silicone foam dressing  - Instruct/assist with weight shifting every 30 minutes when in chair   - Limit chair time to 2   hour intervals  - Use special pressure reducing interventions such as waffle when in chair   - Apply fecal or urinary incontinence containment device   - Perform passive or active ROM every 2 hours  - Turn and reposition patient & offload bony prominences every 2 hours   - Utilize friction reducing device or surface for transfers   - Consider consults to  interdisciplinary teams such as nutrition    - Consider nutrition services referral as needed  Outcome: Progressing

## 2024-11-26 NOTE — ASSESSMENT & PLAN NOTE
Patient presented with acute encephalopathy and seizure-like activity.  Video EEG concerning for status epilepticus.  MRI brain without contrast was limited by motion but showed restricted diffusion in the left temporal lobe which could be postictal versus infection.  Status post LP 11/12 and CSF studies unremarkable, not consistent with infection with 2 WBCs.  Protein mildly elevated which could be due to seizure activity itself.  ME panel negative, stand alone HSV PCR also negative.  However with new onset seizures and possible abnormality of the temporal lobe HSV remains a possibility as PCR may be negative and CSF bland and early disease.  CSF culture with no growth.  The patient has grown E. coli and ESBL Proteus in urine culture but in the absence of prior systemic signs of infection or localizing symptoms UTI, doubt this is causing status epilepticus. HIV negative. Fever and leukocytosis improved.  Mental status remains poor.  IR unable to perform repeat LP due to technical difficulty.  MRI brain without evidence of acute infarct, intracranial hemorrhage or mass.  Mental status improving, she is now following commands.              -Complete empiric 14-day course of acyclovir today              -Monitor CBC and CMP while on acyclovir to monitor for toxicities   -AEDs per neurology

## 2024-11-26 NOTE — CASE MANAGEMENT
Case Management Discharge Planning Note    [facility]  MRN [id number]  : 1960 Date 2024       Current Admission Date: 2024  Current Admission Diagnosis:Seizure (HCC)   Patient Active Problem List    Diagnosis Date Noted Date Diagnosed    Positive blood culture 2024     Abnormal EKG 11/15/2024     Elevated troponin level not due myocardial infarction 11/15/2024     Bacteriuria 11/15/2024     Shock (HCC) 11/15/2024     Acute hypoxic respiratory failure (HCC) 11/15/2024     Seizure (HCC) 2024     Diabetic nephropathy associated with type 2 diabetes mellitus (HCC) 2024     Coagulopathy ([facility]) 2024     Type 2 diabetes mellitus with hyperglycemia, unspecified whether long term insulin use (HCC) 2024     Chronic anticoagulation 2023     Chronic respiratory failure with hypercapnia ([facility]) 2023     CKD (chronic kidney disease) 2023     Anemia in stage 3b chronic kidney disease  (HCC) 2023     Chronic respiratory failure with hypoxia (HCC) 2023     History of hysterectomy 2022     Panniculitis 2022     Abnormal CT scan 2022     Panniculus 2022     Bilateral pleural effusion 2022     Cardiomegaly 2022     Depression, recurrent ([facility]) 2022     Pre-ulcerative corn or callous 2022     Morbid obesity with BMI of 50.0-59.9, adult (HCC) 2021     Secondary hyperparathyroidism of renal origin (HCC) 2021     GERD (gastroesophageal reflux disease) 2020     Anxiety 2020     Chronic constipation 2020     Paroxysmal atrial fibrillation (HCC) 2020     COPD with asthma (HCC) 2020     Hypothyroid 2020     Cellulitis 09/10/2020     H/O right nephrectomy 09/10/2020     Hyperparathyroidism (HCC) 2020     Dyslipidemia 2019     Hypertensive chronic kidney disease with stage 1 through stage 4 chronic kidney disease, or  unspecified chronic kidney disease 10/04/2018     Persistent proteinuria 10/04/2018     Iron deficiency 10/04/2018       LOS (days): 14  Geometric Mean LOS (GMLOS) (days): 8.7  Days to GMLOS:-5     OBJECTIVE:  Risk of Unplanned Readmission Score: 22.6         Current admission status: Inpatient   Preferred Pharmacy:   [facility]/pharmacy #[id number] - [place], [place] - [address]  [address] [address]  Phone: [phone] Fax: [phone]    [facility] [place], [place] - [address]  [address]  [address]  [place] [address]  Phone: [phone] Fax: [phone]    Primary Care Provider: TULIO Beaver    Primary Insurance: [facility]  Secondary Insurance: [facility]    DISCHARGE DETAILS:       CM received VM from pt's [facility] insurance CHEY Villar w/contact information to assist w/DCP needs.  [phone].

## 2024-11-26 NOTE — CASE MANAGEMENT
Case Management Discharge Planning Note    [facility]  [facility] [id number]  : 1960 Date 2024       Current Admission Date: 2024  Current Admission Diagnosis:Seizure (HCC)   Patient Active Problem List    Diagnosis Date Noted Date Diagnosed    Positive blood culture 2024     Abnormal EKG 11/15/2024     Elevated troponin level not due myocardial infarction 11/15/2024     Bacteriuria 11/15/2024     Shock (HCC) 11/15/2024     Acute hypoxic respiratory failure (HCC) 11/15/2024     Seizure (HCC) 2024     Diabetic nephropathy associated with type 2 diabetes mellitus (HCC) 2024     Coagulopathy ([facility]) 2024     Type 2 diabetes mellitus with hyperglycemia, unspecified whether long term insulin use (HCC) 2024     Chronic anticoagulation 2023     Chronic respiratory failure with hypercapnia ([facility]) 2023     CKD (chronic kidney disease) 2023     Anemia in stage 3b chronic kidney disease  (HCC) 2023     Chronic respiratory failure with hypoxia (HCC) 2023     History of hysterectomy 2022     Panniculitis 2022     Abnormal CT scan 2022     Panniculus 2022     Bilateral pleural effusion 2022     Cardiomegaly 2022     Depression, recurrent ([facility]) 2022     Pre-ulcerative corn or callous 2022     Morbid obesity with BMI of 50.0-59.9, adult (HCC) 2021     Secondary hyperparathyroidism of renal origin (HCC) 2021     GERD (gastroesophageal reflux disease) 2020     Anxiety 2020     Chronic constipation 2020     Paroxysmal atrial fibrillation (HCC) 2020     COPD with asthma (HCC) 2020     Hypothyroid 2020     Cellulitis 09/10/2020     H/O right nephrectomy 09/10/2020     Hyperparathyroidism (HCC) 2020     Dyslipidemia 2019     Hypertensive chronic kidney disease with stage 1 through stage 4 chronic kidney disease, or  "unspecified chronic kidney disease 10/04/2018     Persistent proteinuria 10/04/2018     Iron deficiency 10/04/2018       LOS (days): 14  Geometric Mean LOS (GMLOS) (days): 8.7  Days to GMLOS:-5     OBJECTIVE:  Risk of Unplanned Readmission Score: 22.6         Current admission status: Inpatient   Preferred Pharmacy:   [facility]/pharmacy #[id number] - [place], [place] - [address]  [address] [address]  Phone: [phone] Fax: [phone]    [facility] - [place], [place] - [address]  [address]  [address]  [facility] [address]  Phone: [phone] Fax: [phone]    Primary Care Provider: TULIO Beaver    Primary Insurance: [employer] [facility]  Secondary Insurance: [facility]    DISCHARGE DETAILS:    Discharge planning discussed with:: Pt's SO Constanza  Freedom of Choice: Yes  Comments - Freedom of Choice: Discussed FOC     Were Treatment Team discharge recommendations reviewed with patient/caregiver?: Yes  Did patient/caregiver verbalize understanding of patient care needs?: Yes  Were patient/caregiver advised of the risks associated with not following Treatment Team discharge recommendations?: Yes    Contacts  Patient Contacts: Constanza  Relationship to Patient:: Family  Contact Method: Phone  Phone Number: [phone]  Reason/Outcome: Discharge Planning, Referral, Emergency Contact, Continuity of Care    Requested Home Health Care         Is the patient interested in C at discharge?: Yes    CM sent message to provider requesting an update on GOC meeting on 11/25.  Per provider: \"no trach and no LTACH for pt. Pt's S.O. Constanza wants her to go home - just continuing treatment, trying to work on extubation.\"  Per chart review pt placed on spont vent mode, alert/awake, ollows commands.  CM requested palliative consult from provider. Per provider: \"Will hold off on palliative for now. GOC are pretty clear " "after talking to the partner yesterday.\"    This CM called pt's sig other Constanza to discuss dcp when pt is medically stable for discharge.  Taran Apodaca plan is for pt to return home when medically stable.  Constanza has been taking care of pt 24/7 PTH, pt was able to feed self & get up to OOB on her own.  Constanza helped pt w/showers & preparing meals to be sure pt was eating properly.  Pt has hospital bed, O2 concentrator & tanks - uses 2L NC at baseline. Pt has BSC, shower chair, 2 electric scooters & wheelchair.  Per Constanza pt will not go to a SNF for any rehab.  Taran Apodaca pt was at [facility] for 2 weeks and it was a horrible experience.  Per Constanza pt is a DNR - CM notified Constanza per chart documentation pt is currently a Full Code at this time.  Per Constanza she asked pt yesterday if she wanted to be a DNR and pt shook her head yes.  Constanza stated she notified providers of same.  Constanza agreeable to referral to  [facility] pending medical course and stability.    CM sent message to provider requesting to d/w pt's Sig Other to confirm code status.  CM to follow for dcp needs pending medical course.                                                 "

## 2024-11-26 NOTE — WOUND OSTOMY CARE
Progress Note - Wound   Adelina Soto 64 y.o. female MRN: [id number]  Unit/Bed#: MICU 01 Encounter: [id number]        Assessment:   Patient seen today for wound care follow up assessment. Patient is incontinent of bowel and indwelling peace catheter. Patient is dependent for all care, intubated in MICU on critical care low air loss mattress. Patient receiving enteral nutrition.    B/L heels intact and blanching, preventative skin care orders placed.     MASD B/L abdomen/pannus- slight improvement with partial and full thickness skin loss with beefy red and pink tissue, periwound skin is fragile, small serosanguineous drainage.  MASD right Breast fold- small, linear partial thickness skin loss with pink tissue, periwound skin is fragile, scant drainage. Left breast fold is now resolved.  Sacral/buttocks- likely evolving DTI with partial and full thickness skin loss with mixed etiology of pressure and moisture, with beefy red and yellow tissue, periwound skin with fungal rash. Patient is also experiencing bleeding from rectum adding to moisture to sacral area.    No induration, fluctuance, odor, warmth/temperature differences, redness, or purulence noted to the above noted wounds and skin areas assessed. New dressings applied per orders listed below. Patient tolerated well- no s/s of non-verbal pain or discomfort observed during the encounter. Bedside nurse aware of plan of care. See flow sheets for more detailed assessment findings.  Wound care will continue to follow, call or Secure Chat Message with questions.     Skin Care Plan:  1-Cleanse B/L Sacro-Buttocks and Anterior and Posterior Upper Thighs with soap and water. Apply Triad Paste to open aspects of sacro-buttocks. Apply EARLINE Anti-Fungal Cream to Intact aspects of B/L Sacro-Buttocks and posterior thighs BID and PRN episodes of incontinence  2-Turn/reposition q2h or when medically stable for pressure re-distribution on skin. Utilize ATR turning and  repositioning system for patient   3-Elevate heels to offload pressure. Apply offloading boots to b/l feet.   4-Moisturize skin daily with skin nourishing cream  5-Ehob cushion in chair when out of bed.  6-Preventative Hydraguard to bilateral heels BID and PRN.   7-B/L Abdomen/Pannus and Breast Fold Wounds: cleanse with soap and water and pat dry. Apply calcium alginate rope (Melgisorb Plus Cavity) to skin fold. Change daily or PRN soilage/displacement.     Wound 12/11/22 MASD Pelvis Anterior;Left (Active)   Wound Image   11/26/24 0926   Wound Description JUVENTINO 11/26/24 1200   [place]-wound Assessment Fragile 11/26/24 1200   Wound Length (cm) 0.6 cm 11/26/24 0926   Wound Width (cm) 28 cm 11/26/24 0926   Wound Depth (cm) 0.2 cm 11/26/24 0926   Wound Surface Area (cm^2) 16.8 cm^2 11/26/24 0926   Wound Volume (cm^3) 3.36 cm^3 11/26/24 0926   Calculated Wound Volume (cm^3) 3.36 cm^3 11/26/24 0926   Change in Wound Size % 60 11/26/24 0926   Wound Site Closure JUVENTINO 11/26/24 0926   Drainage Amount Small 11/26/24 0926   Drainage Description Serosanguineous 11/26/24 0926   Non-staged Wound Description Full thickness 11/26/24 0926   Treatments Cleansed 11/26/24 0926   Dressing Calcium Alginate 11/26/24 0926   Wound packed? No 11/26/24 0926   Packing- # removed 0 11/26/24 0926   Packing- # inserted 0 11/26/24 0926   Dressing Changed Changed 11/26/24 0926   Patient Tolerance Tolerated well 11/26/24 0926   Dressing Status Intact;Old drainage 11/26/24 1200       Wound 11/13/24 Breast Lateral;Right;Lower (Active)   Wound Image   11/26/24 0928   Wound Description [place] 11/26/24 1200   Azalia-wound Assessment Fragile 11/26/24 1200   Wound Length (cm) 0.3 cm 11/26/24 0928   Wound Width (cm) 3 cm 11/26/24 0928   Wound Depth (cm) 0.1 cm 11/26/24 0928   Wound Surface Area (cm^2) 0.9 cm^2 11/26/24 0928   Wound Volume (cm^3) 0.09 cm^3 11/26/24 0928   Calculated Wound Volume (cm^3) 0.09 cm^3 11/26/24 0928   Change in Wound Size % 64 11/26/24 0928    Wound Site Closure JUVENTINO 11/26/24 1200   Drainage Amount Scant 11/26/24 1200   Drainage Description Serosanguineous 11/26/24 1200   Non-staged Wound Description Partial thickness 11/26/24 1200   Treatments Cleansed 11/26/24 1200   Dressing Calcium Alginate 11/26/24 1200   Wound packed? No 11/26/24 1200   Packing- # removed 0 11/26/24 1200   Packing- # inserted 0 11/26/24 1200   Dressing Changed New 11/26/24 1200   Patient Tolerance Tolerated well 11/26/24 1200   Dressing Status Dry;Intact;Clean 11/26/24 1200       Wound 11/19/24 Pressure Injury Buttocks (Active)   Wound Image   11/26/24 0930   Wound Description Pink;Non-blanchable erythema;Fragile 11/26/24 1200   Pressure Injury Stage DTPI 11/26/24 1200   Azalia-wound Assessment Fragile 11/26/24 1200   Wound Length (cm) 13 cm 11/26/24 0930   Wound Width (cm) 8.5 cm 11/26/24 0930   Wound Depth (cm) 0.2 cm 11/26/24 0930   Wound Surface Area (cm^2) 110.5 cm^2 11/26/24 0930   Wound Volume (cm^3) 22.1 cm^3 11/26/24 0930   Calculated Wound Volume (cm^3) 22.1 cm^3 11/26/24 0930   Change in Wound Size % -123.23 11/26/24 0930   Wound Site Closure JUVENTINO 11/26/24 1200   Drainage Amount Small 11/26/24 1200   Drainage Description Serosanguineous 11/26/24 1200   Non-staged Wound Description Full thickness 11/26/24 1200   Treatments Cleansed 11/26/24 1200   Dressing Moisture barrier 11/26/24 1200   Wound packed? No 11/26/24 1200   Packing- # removed 0 11/26/24 1200   Packing- # inserted 0 11/26/24 1200   Dressing Changed New 11/26/24 1200   Patient Tolerance Tolerated well 11/26/24 1200   Dressing Status Clean;Dry;Intact 11/26/24 1200       Wound 11/23/24 (Active)   Wound Image   11/23/24 1316   Wound Description JUVENTINO 11/26/24 1200   [place]-wound Assessment Dry;Intact;Fragile 11/26/24 1200   Dressing Open to air 11/26/24 1200       Wound 11/25/24 Thigh Anterior;Right (Active)   Wound Image   11/26/24 0927   Wound Description Pink;Slough 11/26/24 0927   [place]-wound Assessment Fragile  11/26/24 0927   Wound Length (cm) 0.6 cm 11/26/24 0927   Wound Width (cm) 0.7 cm 11/26/24 0927   Wound Depth (cm) 0.2 cm 11/26/24 0927   Wound Surface Area (cm^2) 0.42 cm^2 11/26/24 0927   Wound Volume (cm^3) 0.084 cm^3 11/26/24 0927   Calculated Wound Volume (cm^3) 0.08 cm^3 11/26/24 0927   Wound Site Closure JUVENTINO 11/26/24 0927   Drainage Amount None 11/26/24 0927   Non-staged Wound Description Partial thickness 11/26/24 0927   Treatments Cleansed 11/26/24 0927   Dressing Moisture barrier 11/26/24 0927   Wound packed? No 11/26/24 0927   Packing- # removed 0 11/26/24 0927   Packing- # inserted 0 11/26/24 0927   Dressing Changed New 11/26/24 0927   Patient Tolerance Tolerated well 11/26/24 0927   Dressing Status Clean;Dry;Intact 11/26/24 0927         Nataly Snow BSN, RN, CWOCN

## 2024-11-26 NOTE — RESPIRATORY THERAPY NOTE
RT Ventilator Management Note  Adelina Soto 64 y.o. female MRN: [id number]  Unit/Bed#: [facility] 01 Encounter: [id number]      Daily Screen         11/25/2024  0745 11/26/2024  0715          Patient safety screen outcome:: Failed Passed      Not Ready for Weaning due to:: Underline problem not resolved --                Physical Exam:   Assessment Type: Assess only  General Appearance: Sleeping  Respiratory Pattern: Assisted  Chest Assessment: Chest expansion symmetrical  Bilateral Breath Sounds: Diminished, Coarse, Rhonchi, Expiratory wheezes  Cough: Productive  Suction: ET Tube  O2 Device: Ventilator      Resp Comments: pt placed on spont vent mode at this time. vent disinfected and pt suctioned down ett.

## 2024-11-26 NOTE — PROGRESS NOTES
Progress Note - Infectious Disease   Name: Adelina Soto 64 y.o. female I MRN: [id number]  Unit/Bed#: MICU 01 I Date of Admission: 11/12/2024   Date of Service: 11/26/2024 I Hospital Day: 14    Assessment & Plan  Seizure (HCC)   Patient presented with acute encephalopathy and seizure-like activity.  Video EEG concerning for status epilepticus.  MRI brain without contrast was limited by motion but showed restricted diffusion in the left temporal lobe which could be postictal versus infection.  Status post LP 11/12 and CSF studies unremarkable, not consistent with infection with 2 WBCs.  Protein mildly elevated which could be due to seizure activity itself.  ME panel negative, stand alone HSV PCR also negative.  However with new onset seizures and possible abnormality of the temporal lobe HSV remains a possibility as PCR may be negative and CSF bland and early disease.  CSF culture with no growth.  The patient has grown E. coli and ESBL Proteus in urine culture but in the absence of prior systemic signs of infection or localizing symptoms UTI, doubt this is causing status epilepticus. HIV negative. Fever and leukocytosis improved.  Mental status remains poor.  IR unable to perform repeat LP due to technical difficulty.  MRI brain without evidence of acute infarct, intracranial hemorrhage or mass.  Mental status improving, she is now following commands.              -Complete empiric 14-day course of acyclovir today              -Monitor CBC and CMP while on acyclovir to monitor for toxicities   -AEDs per neurology  Bacteriuria  UA with pyuria and urine culture growing E. coli and ESBL Proteus.  No symptoms of infection prior to admission.  Unlikely cause of status epilepticus however in absence of clear cause of seizure activity treated for possible cystitis with 3 days zosyn   -monitor off antibiotics   Shock (HCC)  May be multifactorial related to possible infection, sedating medications.  Off vasopressor  support   -Hemodynamic support per critical care team   -Antimicrobials as above  Acute hypoxic respiratory failure (HCC)  Patient intubated for airway protection.  Status post bronchoscopy 11/13 for mucous plugging and thick left-sided secretions seen.  Chest x-ray not consistent with pneumonia.  BAL culture shows growth of mixed respiratory viktoriya.  Chest x-ray with bilateral airspace opacities but no clinical evidence of pneumonia, patient has been on stable ventilator settings without significant secretions.  No clinical evidence of pneumonia at this time.   -Ventilator management per critical care team   -No additional antibiotics indicated.  Positive blood culture  1 of 2 blood cultures collected 11/24 are growing staph epidermidis. Likely contaminant. Monitor off additional antibiotics   Morbid obesity with BMI of 50.0-59.9, adult ([facility])  Affects antimicrobial dosing  CKD (chronic kidney disease)  Lab Results   Component Value Date    EGFR 42 11/26/2024    EGFR 41 11/25/2024    EGFR 42 11/24/2024    CREATININE 1.32 (H) 11/26/2024    CREATININE 1.35 (H) 11/25/2024    CREATININE 1.31 (H) 11/24/2024   Baseline creatinine 1.7-1.9.  Creatinine stable, slightly below baseline.  Dose adjust antibiotics as needed for creatinine clearance.  Monitor creatinine daily  Type 2 diabetes mellitus with hyperglycemia, unspecified whether long term insulin use ([facility])  Lab Results   Component Value Date    HGBA1C 5.7 (H) 11/13/2024   Diabetes well-controlled.  Glycemic management per primary team    Above management plan to complete acyclovir today discussed with the critical care resident.  ID will follow.    Antibiotics:  Acyclovir day 14    Subjective   The patient remains intubated but is doing well on SBT.  She is now awake and able to follow some commands.  No fevers.  Completing acyclovir today.  Thin secretions noted    Temp:  [98.1 °F (36.7 °C)-99.5 °F (37.5 °C)] 99 °F (37.2 °C)  HR:  [100-125] 113  BP: ()/(46-80)  114/79  Resp:  [14-23] 15  SpO2:  [92 %-100 %] 100 %    General: Intubated, now able to follow commands (wiggles toes, squeezes hand)  Mouth: ET tube in place  Neck: trachea midline   CV: RRR, no murmurs   Lungs: clear to auscultation bilaterally   Abdomen: no distension   Skin: no rashes, cellulitis    Lab Results: I have reviewed the following results:  Results from last 7 days   Lab Units 11/26/24  0454 11/25/24  0733 11/24/24  0541   WBC Thousand/uL 7.52 7.40 8.87   HEMOGLOBIN g/dL 8.8* 7.9* 8.4*   PLATELETS Thousands/uL 300 256 247     Results from last 7 days   Lab Units 11/26/24  0454 11/25/24  0427 11/24/24  0541 11/22/24  0508 11/21/24  0717   SODIUM mmol/L 143 138 141   < > 144   POTASSIUM mmol/L 4.2 4.2 5.6*   < > 4.1   CHLORIDE mmol/L 102 98 102   < > 106   CO2 mmol/L 34* 31 34*   < > 33*   BUN mg/dL 23 24 26*   < > 25   CREATININE mg/dL 1.32* 1.35* 1.31*   < > 1.51*   EGFR ml/min/1.73sq m 42 41 42   < > 36   CALCIUM mg/dL 9.3 8.3* 9.0   < > 8.7   AST U/L  --   --   --   --  13   ALT U/L  --   --   --   --  6*   ALK PHOS U/L  --   --   --   --  83   ALBUMIN g/dL  --   --   --   --  2.5*    < > = values in this interval not displayed.     Results from last 7 days   Lab Units 11/24/24  0032   BLOOD CULTURE  Staphylococcus epidermidis*  No Growth at 48 hrs.   GRAM STAIN RESULT  Gram positive cocci in clusters*               I have personally reviewed pertinent imaging reports and images in PACS.  MRI brain without acute infarct, intracranial hemorrhage or mass

## 2024-11-27 ENCOUNTER — APPOINTMENT (INPATIENT)
Dept: RADIOLOGY | Facility: HOSPITAL | Age: 64
DRG: 097 | End: 2024-11-27
Payer: COMMERCIAL

## 2024-11-27 PROBLEM — B95.8 BACTEREMIA DUE TO STAPHYLOCOCCUS: Status: ACTIVE | Noted: 2024-11-25

## 2024-11-27 LAB
ABO GROUP BLD: NORMAL
ANION GAP SERPL CALCULATED.3IONS-SCNC: 8 MMOL/L (ref 4–13)
BACTERIA BLD CULT: ABNORMAL
BLD GP AB SCN SERPL QL: NEGATIVE
BUN SERPL-MCNC: 23 MG/DL (ref 5–25)
CALCIUM SERPL-MCNC: 8.8 MG/DL (ref 8.4–10.2)
CHLORIDE SERPL-SCNC: 104 MMOL/L (ref 96–108)
CO2 SERPL-SCNC: 32 MMOL/L (ref 21–32)
CREAT SERPL-MCNC: 1.32 MG/DL (ref 0.6–1.3)
ERYTHROCYTE [DISTWIDTH] IN BLOOD BY AUTOMATED COUNT: 19.3 % (ref 11.6–15.1)
GFR SERPL CREATININE-BSD FRML MDRD: 42 ML/MIN/1.73SQ M
GLUCOSE SERPL-MCNC: 109 MG/DL (ref 65–140)
GLUCOSE SERPL-MCNC: 114 MG/DL (ref 65–140)
GLUCOSE SERPL-MCNC: 95 MG/DL (ref 65–140)
GLUCOSE SERPL-MCNC: 98 MG/DL (ref 65–140)
GRAM STN SPEC: ABNORMAL
HCT VFR BLD AUTO: 23.7 % (ref 34.8–46.1)
HCT VFR BLD AUTO: 23.8 % (ref 34.8–46.1)
HGB BLD-MCNC: 7 G/DL (ref 11.5–15.4)
HGB BLD-MCNC: 7.1 G/DL (ref 11.5–15.4)
MAGNESIUM SERPL-MCNC: 1.9 MG/DL (ref 1.9–2.7)
MAGNESIUM SERPL-MCNC: 2.1 MG/DL (ref 1.9–2.7)
MCH RBC QN AUTO: 32.6 PG (ref 26.8–34.3)
MCHC RBC AUTO-ENTMCNC: 29.5 G/DL (ref 31.4–37.4)
MCV RBC AUTO: 110 FL (ref 82–98)
MECA+MECC ISLT/SPM QL: DETECTED
PHOSPHATE SERPL-MCNC: 3.1 MG/DL (ref 2.3–4.1)
PLATELET # BLD AUTO: 257 THOUSANDS/UL (ref 149–390)
PMV BLD AUTO: 9.2 FL (ref 8.9–12.7)
POTASSIUM SERPL-SCNC: 4.1 MMOL/L (ref 3.5–5.3)
RBC # BLD AUTO: 2.15 MILLION/UL (ref 3.81–5.12)
RH BLD: POSITIVE
S EPIDERMIDIS DNA BLD POS QL NAA+NON-PRB: DETECTED
SODIUM SERPL-SCNC: 144 MMOL/L (ref 135–147)
SPECIMEN EXPIRATION DATE: NORMAL
WBC # BLD AUTO: 7.05 THOUSAND/UL (ref 4.31–10.16)

## 2024-11-27 PROCEDURE — 94003 VENT MGMT INPAT SUBQ DAY: CPT

## 2024-11-27 PROCEDURE — 94669 MECHANICAL CHEST WALL OSCILL: CPT

## 2024-11-27 PROCEDURE — 94640 AIRWAY INHALATION TREATMENT: CPT

## 2024-11-27 PROCEDURE — 86850 RBC ANTIBODY SCREEN: CPT

## 2024-11-27 PROCEDURE — 84100 ASSAY OF PHOSPHORUS: CPT

## 2024-11-27 PROCEDURE — 99232 SBSQ HOSP IP/OBS MODERATE 35: CPT | Performed by: INTERNAL MEDICINE

## 2024-11-27 PROCEDURE — 86923 COMPATIBILITY TEST ELECTRIC: CPT

## 2024-11-27 PROCEDURE — 94664 DEMO&/EVAL PT USE INHALER: CPT

## 2024-11-27 PROCEDURE — 86900 BLOOD TYPING SEROLOGIC ABO: CPT

## 2024-11-27 PROCEDURE — 86901 BLOOD TYPING SEROLOGIC RH(D): CPT

## 2024-11-27 PROCEDURE — 85027 COMPLETE CBC AUTOMATED: CPT

## 2024-11-27 PROCEDURE — 71045 X-RAY EXAM CHEST 1 VIEW: CPT

## 2024-11-27 PROCEDURE — 85014 HEMATOCRIT: CPT

## 2024-11-27 PROCEDURE — 82948 REAGENT STRIP/BLOOD GLUCOSE: CPT

## 2024-11-27 PROCEDURE — 85018 HEMOGLOBIN: CPT

## 2024-11-27 PROCEDURE — 80048 BASIC METABOLIC PNL TOTAL CA: CPT

## 2024-11-27 PROCEDURE — 99291 CRITICAL CARE FIRST HOUR: CPT | Performed by: INTERNAL MEDICINE

## 2024-11-27 PROCEDURE — 83735 ASSAY OF MAGNESIUM: CPT

## 2024-11-27 PROCEDURE — 94760 N-INVAS EAR/PLS OXIMETRY 1: CPT

## 2024-11-27 RX ORDER — ALBUMIN (HUMAN) 12.5 G/50ML
25 SOLUTION INTRAVENOUS ONCE
Status: COMPLETED | OUTPATIENT
Start: 2024-11-27 | End: 2024-11-27

## 2024-11-27 RX ORDER — FENTANYL CITRATE/PF 50 MCG/ML
SYRINGE (ML) INJECTION
Status: COMPLETED
Start: 2024-11-27 | End: 2024-11-27

## 2024-11-27 RX ORDER — FUROSEMIDE 10 MG/ML
40 INJECTION INTRAMUSCULAR; INTRAVENOUS ONCE
Status: COMPLETED | OUTPATIENT
Start: 2024-11-27 | End: 2024-11-27

## 2024-11-27 RX ORDER — FENTANYL CITRATE 50 UG/ML
25 INJECTION, SOLUTION INTRAMUSCULAR; INTRAVENOUS
Refills: 0 | Status: DISCONTINUED | OUTPATIENT
Start: 2024-11-27 | End: 2024-11-28

## 2024-11-27 RX ORDER — SODIUM CHLORIDE FOR INHALATION 3 %
4 VIAL, NEBULIZER (ML) INHALATION
Status: DISCONTINUED | OUTPATIENT
Start: 2024-11-27 | End: 2024-11-27

## 2024-11-27 RX ORDER — ALBUMIN HUMAN 50 G/1000ML
25 SOLUTION INTRAVENOUS ONCE
Status: COMPLETED | OUTPATIENT
Start: 2024-11-27 | End: 2024-11-27

## 2024-11-27 RX ORDER — SODIUM CHLORIDE FOR INHALATION 3 %
4 VIAL, NEBULIZER (ML) INHALATION
Status: DISCONTINUED | OUTPATIENT
Start: 2024-11-27 | End: 2024-12-08

## 2024-11-27 RX ADMIN — VALPROATE SODIUM 500 MG: 100 INJECTION, SOLUTION INTRAVENOUS at 04:56

## 2024-11-27 RX ADMIN — LEVETIRACETAM 750 MG: 750 TABLET, FILM COATED ORAL at 20:05

## 2024-11-27 RX ADMIN — FAMOTIDINE 20 MG: 20 TABLET, FILM COATED ORAL at 08:25

## 2024-11-27 RX ADMIN — SODIUM CHLORIDE SOLN NEBU 3% 4 ML: 3 NEBU SOLN at 07:50

## 2024-11-27 RX ADMIN — SODIUM CHLORIDE SOLN NEBU 3% 4 ML: 3 NEBU SOLN at 18:53

## 2024-11-27 RX ADMIN — ALBUMIN (HUMAN) 25 G: 12.5 INJECTION, SOLUTION INTRAVENOUS at 00:13

## 2024-11-27 RX ADMIN — SERTRALINE HYDROCHLORIDE 50 MG: 50 TABLET ORAL at 08:25

## 2024-11-27 RX ADMIN — FENTANYL CITRATE 25 MCG: 50 INJECTION INTRAMUSCULAR; INTRAVENOUS at 19:48

## 2024-11-27 RX ADMIN — ENOXAPARIN SODIUM 120 MG: 120 INJECTION SUBCUTANEOUS at 20:05

## 2024-11-27 RX ADMIN — LEVETIRACETAM 750 MG: 750 TABLET, FILM COATED ORAL at 08:25

## 2024-11-27 RX ADMIN — MICONAZOLE NITRATE: 20 CREAM TOPICAL at 17:00

## 2024-11-27 RX ADMIN — ENOXAPARIN SODIUM 120 MG: 120 INJECTION SUBCUTANEOUS at 08:30

## 2024-11-27 RX ADMIN — THIAMINE HCL TAB 100 MG 100 MG: 100 TAB at 08:25

## 2024-11-27 RX ADMIN — LEVOTHYROXINE SODIUM 100 MCG: 100 TABLET ORAL at 05:41

## 2024-11-27 RX ADMIN — MICONAZOLE NITRATE: 20 CREAM TOPICAL at 08:24

## 2024-11-27 RX ADMIN — CHLORHEXIDINE GLUCONATE 0.12% ORAL RINSE 15 ML: 1.2 LIQUID ORAL at 20:05

## 2024-11-27 RX ADMIN — VALPROATE SODIUM 500 MG: 100 INJECTION, SOLUTION INTRAVENOUS at 09:58

## 2024-11-27 RX ADMIN — LEVALBUTEROL HYDROCHLORIDE 1.25 MG: 1.25 SOLUTION RESPIRATORY (INHALATION) at 13:46

## 2024-11-27 RX ADMIN — FUROSEMIDE 40 MG: 10 INJECTION, SOLUTION INTRAMUSCULAR; INTRAVENOUS at 13:33

## 2024-11-27 RX ADMIN — ATORVASTATIN CALCIUM 10 MG: 10 TABLET, FILM COATED ORAL at 16:53

## 2024-11-27 RX ADMIN — CHLORHEXIDINE GLUCONATE 0.12% ORAL RINSE 15 ML: 1.2 LIQUID ORAL at 08:24

## 2024-11-27 RX ADMIN — VALPROATE SODIUM 500 MG: 100 INJECTION, SOLUTION INTRAVENOUS at 21:04

## 2024-11-27 RX ADMIN — VALPROATE SODIUM 500 MG: 100 INJECTION, SOLUTION INTRAVENOUS at 16:53

## 2024-11-27 RX ADMIN — ALBUMIN (HUMAN) 25 G: 0.25 INJECTION, SOLUTION INTRAVENOUS at 11:17

## 2024-11-27 RX ADMIN — LEVALBUTEROL HYDROCHLORIDE 1.25 MG: 1.25 SOLUTION RESPIRATORY (INHALATION) at 07:50

## 2024-11-27 RX ADMIN — LEVALBUTEROL HYDROCHLORIDE 1.25 MG: 1.25 SOLUTION RESPIRATORY (INHALATION) at 18:53

## 2024-11-27 NOTE — PLAN OF CARE
Problem: PAIN - ADULT  Goal: Verbalizes/displays adequate comfort level or baseline comfort level  Description: Interventions:  - Encourage patient to monitor pain and request assistance  - Assess pain using appropriate pain scale  - Administer analgesics based on type and severity of pain and evaluate response  - Implement non-pharmacological measures as appropriate and evaluate response  - Consider cultural and social influences on pain and pain management  - Notify physician/advanced practitioner if interventions unsuccessful or patient reports new pain  Outcome: Progressing     Problem: INFECTION - ADULT  Goal: Absence or prevention of progression during hospitalization  Description: INTERVENTIONS:  - Assess and monitor for signs and symptoms of infection  - Monitor lab/diagnostic results  - Monitor all insertion sites, i.e. indwelling lines, tubes, and drains  - Monitor endotracheal if appropriate and nasal secretions for changes in amount and color  - [place] appropriate cooling/warming therapies per order  - Administer medications as ordered  - Instruct and encourage patient and family to use good hand hygiene technique  - Identify and instruct in appropriate isolation precautions for identified infection/condition  Outcome: Progressing  Goal: Absence of fever/infection during neutropenic period  Description: INTERVENTIONS:  - Monitor WBC    Outcome: Progressing     Problem: SAFETY ADULT  Goal: Patient will remain free of falls  Description: INTERVENTIONS:  - Educate patient/family on patient safety including physical limitations  - Instruct patient to call for assistance with activity   - Consult OT/PT to assist with strengthening/mobility   - Keep Call bell within reach  - Keep bed low and locked with side rails adjusted as appropriate  - Keep care items and personal belongings within reach  - Initiate and maintain comfort rounds  - Make Fall Risk Sign visible to staff  - Apply yellow socks and bracelet  for high fall risk patients  - Consider moving patient to room near nurses station  Outcome: Progressing  Goal: Maintain or return to baseline ADL function  Description: INTERVENTIONS:  -  Assess patient's ability to carry out ADLs; assess patient's baseline for ADL function and identify physical deficits which impact ability to perform ADLs (bathing, care of mouth/teeth, toileting, grooming, dressing, etc.)  - Assess/evaluate cause of self-care deficits   - Assess range of motion  - Assess patient's mobility; develop plan if impaired  - Assess patient's need for assistive devices and provide as appropriate  - Encourage maximum independence but intervene and supervise when necessary  - Involve family in performance of ADLs  - Assess for home care needs following discharge   - Consider OT consult to assist with ADL evaluation and planning for discharge  - Provide patient education as appropriate  Outcome: Progressing  Goal: Maintains/Returns to pre admission functional level  Description: INTERVENTIONS:  - Perform AM-PAC 6 Click Basic Mobility/ Daily Activity assessment daily.  - Set and communicate daily mobility goal to care team and patient/family/caregiver.   - Collaborate with rehabilitation services on mobility goals if consulted  - Record patient progress and toleration of activity level   Outcome: Progressing     Problem: DISCHARGE PLANNING  Goal: Discharge to home or other facility with appropriate resources  Description: INTERVENTIONS:  - Identify barriers to discharge w/patient and caregiver  - Arrange for needed discharge resources and transportation as appropriate  - Identify discharge learning needs (meds, wound care, etc.)  - Arrange for interpretive services to assist at discharge as needed  - Refer to Case Management Department for coordinating discharge planning if the patient needs post-hospital services based on physician/advanced practitioner order or complex needs related to functional status,  cognitive ability, or social support system  Outcome: Progressing     Problem: Knowledge Deficit  Goal: Patient/family/caregiver demonstrates understanding of disease process, treatment plan, medications, and discharge instructions  Description: Complete learning assessment and assess knowledge base.  Interventions:  - Provide teaching at level of understanding  - Provide teaching via preferred learning methods  Outcome: Progressing     Problem: NEUROSENSORY - ADULT  Goal: Achieves stable or improved neurological status  Description: INTERVENTIONS  - Monitor and report changes in neurological status  - Monitor vital signs such as temperature, blood pressure, glucose, and any other labs ordered   - Initiate measures to prevent increased intracranial pressure  - Monitor for seizure activity and implement precautions if appropriate      Outcome: Progressing  Goal: Remains free of injury related to seizures activity  Description: INTERVENTIONS  - Maintain airway, patient safety  and administer oxygen as ordered  - Monitor patient for seizure activity, document and report duration and description of seizure to physician/advanced practitioner  - If seizure occurs,  ensure patient safety during seizure  - Reorient patient post seizure  - Seizure pads on all 4 side rails  - Instruct patient/family to notify RN of any seizure activity including if an aura is experienced  - Instruct patient/family to call for assistance with activity based on nursing assessment  - Administer anti-seizure medications if ordered    Outcome: Progressing  Goal: Achieves maximal functionality and self care  Description: INTERVENTIONS  - Monitor swallowing and airway patency with patient fatigue and changes in neurological status  - Encourage and assist patient to increase activity and self care.   - Encourage visually impaired, hearing impaired and aphasic patients to use assistive/communication devices  Outcome: Progressing     Problem:  CARDIOVASCULAR - ADULT  Goal: Maintains optimal cardiac output and hemodynamic stability  Description: INTERVENTIONS:  - Monitor I/O, vital signs and rhythm  - Monitor for S/S and trends of decreased cardiac output  - Administer and titrate ordered vasoactive medications to optimize hemodynamic stability  - Assess quality of pulses, skin color and temperature  - Assess for signs of decreased coronary artery perfusion  - Instruct patient to report change in severity of symptoms  Outcome: Progressing  Goal: Absence of cardiac dysrhythmias or at baseline rhythm  Description: INTERVENTIONS:  - Continuous cardiac monitoring, vital signs, obtain 12 lead EKG if ordered  - Administer antiarrhythmic and heart rate control medications as ordered  - Monitor electrolytes and administer replacement therapy as ordered  Outcome: Progressing     Problem: RESPIRATORY - ADULT  Goal: Achieves optimal ventilation and oxygenation  Description: INTERVENTIONS:  - Assess for changes in respiratory status  - Assess for changes in mentation and behavior  - Position to facilitate oxygenation and minimize respiratory effort  - Oxygen administered by appropriate delivery if ordered  - Initiate smoking cessation education as indicated  - Encourage broncho-pulmonary hygiene including cough, deep breathe, Incentive Spirometry  - Assess the need for suctioning and aspirate as needed  - Assess and instruct to report SOB or any respiratory difficulty  - Respiratory Therapy support as indicated  Outcome: Progressing     Problem: GASTROINTESTINAL - ADULT  Goal: Minimal or absence of nausea and/or vomiting  Description: INTERVENTIONS:  - Administer IV fluids if ordered to ensure adequate hydration  - Maintain NPO status until nausea and vomiting are resolved  - Nasogastric tube if ordered  - Administer ordered antiemetic medications as needed  - Provide nonpharmacologic comfort measures as appropriate  - Advance diet as tolerated, if ordered  - Consider  nutrition services referral to assist patient with adequate nutrition and appropriate food choices  Outcome: Progressing  Goal: Maintains or returns to baseline bowel function  Description: INTERVENTIONS:  - Assess bowel function  - Encourage oral fluids to ensure adequate hydration  - Administer IV fluids if ordered to ensure adequate hydration  - Administer ordered medications as needed  - Encourage mobilization and activity  - Consider nutritional services referral to assist patient with adequate nutrition and appropriate food choices  Outcome: Progressing  Goal: Maintains adequate nutritional intake  Description: INTERVENTIONS:  - Monitor percentage of each meal consumed  - Identify factors contributing to decreased intake, treat as appropriate  - Assist with meals as needed  - Monitor I&O, weight, and lab values if indicated  - Obtain nutrition services referral as needed  Outcome: Progressing  Goal: Establish and maintain optimal ostomy function  Description: INTERVENTIONS:  - Assess bowel function  - Encourage oral fluids to ensure adequate hydration  - Administer IV fluids if ordered to ensure adequate hydration   - Administer ordered medications as needed  - Encourage mobilization and activity  - Nutrition services referral to assist patient with appropriate food choices  - Assess stoma site  - Consider wound care consult   Outcome: Progressing  Goal: Oral mucous membranes remain intact  Description: INTERVENTIONS  - Assess oral mucosa and hygiene practices  - Implement preventative oral hygiene regimen  - Implement oral medicated treatments as ordered  - Initiate Nutrition services referral as needed  Outcome: Progressing     Problem: GENITOURINARY - ADULT  Goal: Maintains or returns to baseline urinary function  Description: INTERVENTIONS:  - Assess urinary function  - Encourage oral fluids to ensure adequate hydration if ordered  - Administer IV fluids as ordered to ensure adequate hydration  -  Administer ordered medications as needed  - Offer frequent toileting  - Follow urinary retention protocol if ordered  Outcome: Progressing  Goal: Absence of urinary retention  Description: INTERVENTIONS:  - Assess patient’s ability to void and empty bladder  - Monitor I/O  - Bladder scan as needed  - Discuss with physician/AP medications to alleviate retention as needed  - Discuss catheterization for long term situations as appropriate  Outcome: Progressing  Goal: Urinary catheter remains patent  Description: INTERVENTIONS:  - Assess patency of urinary catheter  - If patient has a chronic peace, consider changing catheter if non-functioning  - Follow guidelines for intermittent irrigation of non-functioning urinary catheter  Outcome: Progressing     Problem: METABOLIC, FLUID AND ELECTROLYTES - ADULT  Goal: Electrolytes maintained within normal limits  Description: INTERVENTIONS:  - Monitor labs and assess patient for signs and symptoms of electrolyte imbalances  - Administer electrolyte replacement as ordered  - Monitor response to electrolyte replacements, including repeat lab results as appropriate  - Instruct patient on fluid and nutrition as appropriate  Outcome: Progressing  Goal: Fluid balance maintained  Description: INTERVENTIONS:  - Monitor labs   - Monitor I/O and WT  - Instruct patient on fluid and nutrition as appropriate  - Assess for signs & symptoms of volume excess or deficit  Outcome: Progressing  Goal: Glucose maintained within target range  Description: INTERVENTIONS:  - Monitor Blood Glucose as ordered  - Assess for signs and symptoms of hyperglycemia and hypoglycemia  - Administer ordered medications to maintain glucose within target range  - Assess nutritional intake and initiate nutrition service referral as needed  Outcome: Progressing     Problem: SKIN/TISSUE INTEGRITY - ADULT     Problem: HEMATOLOGIC - ADULT  Goal: Maintains hematologic stability  Description: INTERVENTIONS  - Assess for  signs and symptoms of bleeding or hemorrhage  - Monitor labs  - Administer supportive blood products/factors as ordered and appropriate  Outcome: Progressing     Problem: MUSCULOSKELETAL - ADULT  Goal: Maintain or return mobility to safest level of function  Description: INTERVENTIONS:  - Assess patient's ability to carry out ADLs; assess patient's baseline for ADL function and identify physical deficits which impact ability to perform ADLs (bathing, care of mouth/teeth, toileting, grooming, dressing, etc.)  - Assess/evaluate cause of self-care deficits   - Assess range of motion  - Assess patient's mobility  - Assess patient's need for assistive devices and provide as appropriate  - Encourage maximum independence but intervene and supervise when necessary  - Involve family in performance of ADLs  - Assess for home care needs following discharge   - Consider OT consult to assist with ADL evaluation and planning for discharge  - Provide patient education as appropriate  Outcome: Progressing  Goal: Maintain proper alignment of affected body part  Description: INTERVENTIONS:  - Support, maintain and protect limb and body alignment  - Provide patient/ family with appropriate education  Outcome: Progressing     Problem: Nutrition/Hydration-ADULT  Goal: Nutrient/Hydration intake appropriate for improving, restoring or maintaining nutritional needs  Description: Monitor and assess patient's nutrition/hydration status for malnutrition. Collaborate with interdisciplinary team and initiate plan and interventions as ordered.  Monitor patient's weight and dietary intake as ordered or per policy. Utilize nutrition screening tool and intervene as necessary. Determine patient's food preferences and provide high-protein, high-caloric foods as appropriate.     INTERVENTIONS:  - Monitor oral intake, urinary output, labs, and treatment plans  - Assess nutrition and hydration status and recommend course of action  - Evaluate amount of  meals eaten  - Assist patient with eating if necessary   - Allow adequate time for meals  - Recommend/ encourage appropriate diets, oral nutritional supplements, and vitamin/mineral supplements  - Order, calculate, and assess calorie counts as needed  - Recommend, monitor, and adjust tube feedings and TPN/PPN based on assessed needs  - Assess need for intravenous fluids  - Provide specific nutrition/hydration education as appropriate  - Include patient/family/caregiver in decisions related to nutrition  Outcome: Progressing     Problem: Prexisting or High Potential for Compromised Skin Integrity  Goal: Skin integrity is maintained or improved  Description: INTERVENTIONS:  - Identify patients at risk for skin breakdown  - Assess and monitor skin integrity  - Assess and monitor nutrition and hydration status  - Monitor labs   - Assess for incontinence   - Turn and reposition patient  - Assist with mobility/ambulation  - Relieve pressure over bony prominences  - Avoid friction and shearing  - Provide appropriate hygiene as needed including keeping skin clean and dry  - Evaluate need for skin moisturizer/barrier cream  - Collaborate with interdisciplinary team   - Patient/family teaching  - Consider wound care consult   Outcome: Progressing

## 2024-11-27 NOTE — RESPIRATORY THERAPY NOTE
RT Ventilator Management Note  Adelnia Soto 64 y.o. female MRN: [id number]  [facility]: [id number]      Daily Screen         11/26/2024  1032 11/27/2024  0752          Patient safety screen outcome:: Passed Passed      Not Ready for Weaning due to:: Underline problem not resolved Underline problem not resolved      Spont breathing trial % for 30 min: -- No                Physical Exam:   Assessment Type: During-treatment  General Appearance: Awake  Respiratory Pattern: Assisted  Chest Assessment: Chest expansion symmetrical  Bilateral Breath Sounds: Diminished, Coarse  Cough: Productive      Resp Comments: pt remians on spontaneopus mode at this time will continue to monitor

## 2024-11-27 NOTE — PROGRESS NOTES
Progress Note - Infectious Disease   Name: Adelina Soto 64 y.o. female I MRN: [id number]  Unit/Bed#: [facility] 01 I Date of Admission: 11/12/2024   Date of Service: 11/27/2024 I Hospital Day: 15    Assessment & Plan  Seizure (HCC)   Patient presented with acute encephalopathy and seizure-like activity.  Video EEG concerning for status epilepticus.  MRI brain without contrast was limited by motion but showed restricted diffusion in the left temporal lobe which could be postictal versus infection.  Status post LP 11/12 and CSF studies unremarkable, not consistent with infection with 2 WBCs.  Protein mildly elevated which could be due to seizure activity itself.  ME panel negative, stand alone HSV PCR also negative.  However with new onset seizures and possible abnormality of the temporal lobe HSV remains a possibility as PCR may be negative and CSF bland and early disease.  CSF culture with no growth.  The patient has grown E. coli and ESBL Proteus in urine culture but in the absence of prior systemic signs of infection or localizing symptoms UTI, doubt this is causing status epilepticus. HIV negative. Fever and leukocytosis improved.  Mental status remains poor.  IR unable to perform repeat LP due to technical difficulty.  MRI brain without evidence of acute infarct, intracranial hemorrhage or mass.  Mental status improving, she is now following commands.  Patient completed 14 days of intravenous acyclovir and is now off all antivirals              -No additional IV acyclovir for now              -Monitor CBC and CMP while on acyclovir to monitor for toxicities  Bacteriuria  UA with pyuria and urine culture growing E. coli and ESBL Proteus.  No symptoms of infection prior to admission.  Unlikely cause of status epilepticus however in absence of clear cause of seizure activity treated for possible cystitis with 3 days zosyn   -monitor off antibiotics   Shock (HCC)  May be multifactorial related to possible infection,  sedating medications.  Off vasopressor support   -Hemodynamic support per critical care team   -Antimicrobials as above  Acute hypoxic respiratory failure (HCC)  Patient intubated for airway protection.  Status post bronchoscopy 11/13 for mucous plugging and thick left-sided secretions seen.  Chest x-ray not consistent with pneumonia.  BAL culture shows growth of mixed respiratory viktoriya.  Chest x-ray with bilateral airspace opacities but no clinical evidence of pneumonia, patient has been on stable ventilator settings without significant secretions.  No clinical evidence of pneumonia at this time.   -Ventilator management per critical care team   -No additional antibiotics indicated.  Bacteremia due to Staphylococcus  1 of 2 blood cultures collected 11/24 are growing staph epidermidis. Likely contaminant. Monitor off additional antibiotics   Morbid obesity with BMI of 50.0-59.9, adult ([facility])  Affects antimicrobial dosing  CKD (chronic kidney disease)  Lab Results   Component Value Date    EGFR 42 11/27/2024    EGFR 42 11/26/2024    EGFR 41 11/25/2024    CREATININE 1.32 (H) 11/27/2024    CREATININE 1.32 (H) 11/26/2024    CREATININE 1.35 (H) 11/25/2024   Baseline creatinine 1.7-1.9.  Creatinine stable, slightly below baseline.  Dose adjust antibiotics as needed for creatinine clearance.  Monitor creatinine daily  Type 2 diabetes mellitus with hyperglycemia, unspecified whether long term insulin use ([facility])  Lab Results   Component Value Date    HGBA1C 5.7 (H) 11/13/2024   Diabetes well-controlled.  Glycemic management per primary team    Discussed in detail with the primary service the plan to monitor off antibiotics for now and they agree with the plan.    Antibiotics:  Off IV acyclovir 1    Subjective   Patient remains afebrile and hemodynamically stable.  Not requiring any vasopressor support.  Remains on the ventilator.  Improved cognition with following simple commands.    Objective :  Temp:  [97.2 °F (36.2 °C)-99.9  °F (37.7 °C)] 98.8 °F (37.1 °C)  HR:  [] 102  BP: ()/(45-75) 88/57  Resp:  [13-53] 23  SpO2:  [93 %-100 %] 99 %  O2 Device: Ventilator  FiO2 (%):  [40] 40    General: Resting on the ventilator no acute distress  Psychiatric: Somnolent, arousable, opens eyes, and follows simple commands  Pulmonary:  Normal respiratory excursion without accessory muscle use  Abdomen:  Soft, nontender  Extremities:  No edema  Skin:  No rashes      Lab Results: I have reviewed the following results:  Results from last 7 days   Lab Units 11/27/24  1116 11/27/24  0503 11/26/24  1902 11/26/24  0454 11/25/24  0733   WBC Thousand/uL  --  7.05  --  7.52 7.40   HEMOGLOBIN g/dL 7.1* 7.0* 8.2* 8.8* 7.9*   PLATELETS Thousands/uL  --  257  --  300 256     Results from last 7 days   Lab Units 11/27/24  0503 11/26/24  0454 11/25/24  0427 11/22/24  0508 11/21/24  0717   SODIUM mmol/L 144 143 138   < > 144   POTASSIUM mmol/L 4.1 4.2 4.2   < > 4.1   CHLORIDE mmol/L 104 102 98   < > 106   CO2 mmol/L 32 34* 31   < > 33*   BUN mg/dL 23 23 24   < > 25   CREATININE mg/dL 1.32* 1.32* 1.35*   < > 1.51*   EGFR ml/min/1.73sq m 42 42 41   < > 36   CALCIUM mg/dL 8.8 9.3 8.3*   < > 8.7   AST U/L  --   --   --   --  13   ALT U/L  --   --   --   --  6*   ALK PHOS U/L  --   --   --   --  83   ALBUMIN g/dL  --   --   --   --  2.5*    < > = values in this interval not displayed.     Results from last 7 days   Lab Units 11/24/24  0032   BLOOD CULTURE  No Growth at 72 hrs.  Staphylococcus epidermidis*   GRAM STAIN RESULT  Gram positive cocci in clusters*

## 2024-11-27 NOTE — PROGRESS NOTES
Progress Note - Critical Care/ICU   Name: Adelina Soto 64 y.o. female I MRN: [id number]  [facility] I Date of Admission: 11/12/2024   Date of Service: 11/27/2024 I Hospital Day: 15       Summary:  Patient is a 64 year old female with history of Afib on Warfarin, HTN, T2DM, morbid obesity, HLD, hypothyroidism, initially presented to [facility] on 11/12 after being found by her partner at home with AMS, seizure-like activity. EMS witnessed tonic-clonic seizure-like activity, treated with 6 mg of Versed. 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 Was subsequently intubated for airway protection and transferred to [facility]. Initial LP generally unremarkable apart from RBC's 300's from otherwise atraumatic tap, negative ME panel and HSV. 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     [facility], during evaluation pressors were weaned and AEDs adjusted. Underwent CT head which appeared unchanged, LP on 11/19 with fluoro unsuccessful. Off video EEG 11/20, repeat MRI showed no acute findings. Minimal improvements in neurological exam thus far, does open eyes to voice and blink on command. Ongoing discussion regarding goals of care, tracheostomy/feeding tube in LTAC versus comfort care, POA thus far remains treatment oriented. Had family meeting 11/25, declined tracheostomy and also would not wish to have her remain in an LTAC / nursing home long term. Additionally discussed 11/26 that she would not like to be resuscitated in the event of a cardiac arrest, code status updated to Level 2 DNR/DNI.     Overall very gradual improvements in neurological exam day-to-day, able to breath spontaneously on the ventilator and follow basic commands,  but still too weak to safely extubate.    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, started on acyclovir  Neurology following, vEEG initially showing left frontal seizures, burst suppressed initially, now on Keppra 1g q12h, Phenytoin 100 mg q8h, Depacon 375 mg q6h  LP showed glucose 89, protein 74, 339 RBC's, 2 WBC's, ME panel negative  ID following, completed acyclovir 10 mg/kg every 12 hours through 11/26.  Off vEEG 11/20  Repeat MRI 11/20 unremarkable  More agitation, getting PRN Fentanyl doses; otherwise exam continues to very gradually improve day-to-day     Diagnosis: H/o depression  Plan:  Continue home Zoloft     CV:   Diagnosis: Shock/hypotension, likely in setting of sedation - resolved  Plan:  MAP 65-70, off pressors      Diagnosis: H/o Afib on Warfarin  Plan:  Lopressor 50 mg PO BID  AC with Lovenox 1 mg/kg BID  Consider amiodarone for rate control if persistently > 110 bpm     Pulm:  Diagnosis: Acute hypoxic respiratory failure  Intubated on arrival for airway protection due to AMS  Vent settings: Spontaneous, 10/6/40%  Bronch 11/12 with generalized edematous friable mucosa and moderate thick purulent secretions, culture with mixed respiratory viktoriya  Initial chest x-ray on arrival with mild edema more so on the right, left-sided whiteout likely from mucous plugging and atelectasis, given improvement after bronchoscopy, now continues to have crackles bilaterally  Repeat bronchoscopy 11/20 generally unremarkable  SAT/SBT daily     GI:   Diagnosis: GERD  Plan:  Continue home pepcid    Diagnosis: Rectal bleeding  Noted to have some rectal bleeding with a blood clot near rectal tube site 11/26  Patient does reportedly have history of internal hemorrhoids, suspect this was likely related to trauma from rectal tube insertion or manipulation  Removed, continue to monitor for any persistent  clots/bleeding     :   Diagnosis: CKD  Baseline Cr 1.4-1.8  Remains baseline throughout hospital stay  Monitor renal fx, monitor I/O's, avoid nephrotoxins     F/E/N:   F - none  E - K > 4, Mg > 2, Phos > 3  N - TF with Vital 1.2 @ 55/hr     Heme/Onc:   Diagnosis: Anemia  Hgb 9-10 this admission  Stable thus far, transfuse if < 7     Endo:   Diagnosis: Hypothyroidism  TSH 0.29, T4 1.23  Continue home levothyroxine     Diagnosis: T2DM  Diet controlled, not on medications outpatient  Monitor BG for goal 140-180  Add SSI if needed     ID:   Diagnosis: HSV encephalitis  Initial MRI brain w/ c/f HSV encephalitis based on left temporal enhancement  LP and ME panel neg, unfortunately unable to obtain second CSF analyses for confirmation  ID following, Acyclovir 10 mg/kg q12h through 11/26  Repeat infectious w/u if repeatedly elevated temp     Diagnosis: UTI, possible pneumonia  Urine culture growing E. coli and ESBL proteus  Earlier bronchoscopy with copious purulent secretions, however culture with mixed respiratory viktoriya  Not treated earlier in admission given lack of fever, improvement in leukocytosis     MSK/Skin:   Diagnosis: Pressure wounds  Continue wound care     Disposition: Critical care    ICU Core Measures     Vented Patient  VAP Bundle  VAP bundle ordered     A: Assess, Prevent, and Manage Pain Has pain been assessed? Yes  Need for changes to pain regimen? No   B: Both Spontaneous Awakening Trials (SATs) and Spontaneous Breathing Trials (SBTs) Plan to perform spontaneous awakening trial today? Yes   Plan to perform spontaneous breathing trial today? Yes   Obvious barriers to extubation? Yes   C: Choice of Sedation RASS Goal: 0 Alert and Calm  Need for changes to sedation or analgesia regimen? No   D: Delirium CAM-ICU: Unable to perform secondary to Acute cognitive dysfunction   E: Early Mobility  Plan for early mobility? NA   F: Family Engagement Plan for family engagement today? Yes       Review of Invasive  Devices:    Stringer Plan: Continue secondary to wounds that would be worsened by incontinence        Prophylaxis:  VTE VTE covered by:  enoxaparin, Subcutaneous, 120 mg at 24 0830       Stress Ulcer  covered byfamotidine (PEPCID) 20 mg tablet [[id number]] (Long-Term Med), famotidine (PEPCID) tablet 20 mg [[id number]], pantoprazole (PROTONIX) 40 mg tablet [[id number]] (Long-Term Med)         24 Hour Events : Overnight treated with 2L   Subjective   Review of Systems: Review of Systems not obtainable due to Clinical Condition    Objective :                   Vitals I/O      Most Recent Min/Max in 24hrs   Temp 99 °F (37.2 °C) Temp  Min: 97.2 °F (36.2 °C)  Max: 99.9 °F (37.7 °C)   Pulse 104 Pulse  Min: 98  Max: 129   Resp (!) 28 Resp  Min: 14  Max: 53   BP (!) 89/51 BP  Min: 81/49  Max: 122/72   O2 Sat 93 % SpO2  Min: 90 %  Max: 100 %      Intake/Output Summary (Last 24 hours) at 2024 0848  Last data filed at 2024 0601  Gross per 24 hour   Intake 4025 ml   Output 1900 ml   Net 2125 ml       Diet Enteral/Parenteral; Tube Feeding No Oral Diet; Vital AF 1.2; Cyclic; 55; 22 hours; 200; Every 4 hours    Invasive Monitoring           Physical Exam   Physical Exam  Vitals and nursing note reviewed.   Eyes:      Comments: Pupils were equal, sluggish but responsive to light   HENT:      Head: Normocephalic and atraumatic.   Cardiovascular:      Rate and Rhythm: Regular rhythm. Tachycardia present.   Musculoskeletal:      Right lower le+ Edema present.      Left lower le+ Edema present.   Abdominal:      Palpations: Abdomen is soft.      Tenderness: There is no abdominal tenderness.   Constitutional:       Appearance: She is ill-appearing.      Interventions: She is intubated and restrained. She is not sedated.  Pulmonary:      Effort: Pulmonary effort is normal. She is intubated.   Neurological:      Comments: Opens eyes to voice, able to nod/shake head in response to questions, some movement of all 4  extremities, able to lift head off bed minimally.   Genitourinary/Anorectal:  Strniger present.        Diagnostic Studies        Lab Results: I have reviewed the following results:     Medications:  Scheduled PRN   atorvastatin, 10 mg, Daily With Dinner  chlorhexidine, 15 mL, Q12H LINO  enoxaparin, 1 mg/kg, Q12H LINO  famotidine, 20 mg, Daily  insulin lispro, 2-12 Units, Q6H LINO  levalbuterol, 1.25 mg, TID  levETIRAcetam, 750 mg, Q12H LINO  levothyroxine, 100 mcg, Early Morning  metoprolol tartrate, 50 mg, Q8H  EARLINE ANTIFUNGAL, , BID  sertraline, 50 mg, Daily  sodium chloride, 4 mL, Q8H  thiamine, 100 mg, Daily  valproate sodium, 500 mg, Q6H LINO      acetaminophen, 1,000 mg, Q6H PRN  albuterol, 2.5 mg, Q6H PRN  LORazepam, 2 mg, Q4H PRN  ondansetron, 4 mg, Q4H PRN       Continuous          Labs:   CBC    Recent Labs     11/26/24 0454 11/26/24  1902 11/27/24  0503   WBC 7.52  --  7.05   HGB 8.8* 8.2* 7.0*   HCT 29.0* 27.3* 23.7*     --  257     BMP    Recent Labs     11/26/24 0454 11/27/24  0503   SODIUM 143 144   K 4.2 4.1    104   CO2 34* 32   AGAP 7 8   BUN 23 23   CREATININE 1.32* 1.32*   CALCIUM 9.3 8.8       Coags    No recent results     Additional Electrolytes  Recent Labs     11/26/24 0454 11/27/24  0503   MG  --  1.9   PHOS 3.5 3.1          Blood Gas    No recent results    No recent results   LFTs  No recent results    Infectious  No recent results  Glucose  Recent Labs     11/26/24 0454 11/27/24  0503   GLUC 108 114

## 2024-11-27 NOTE — RESPIRATORY THERAPY NOTE
11/27/24 0310   Respiratory Assessment   Resp Comments No adverse events overnight   Vent Information   Vent type Kathleen G5   Kathleen Vent Mode SPONT/TRC   $ Vent Daily Charge-Subsequent Yes   $ Pulse Oximetry Spot Check Charge Completed   SPONT/TRC Settings   FIO2 (%) 40 %   PEEP (cmH2O) 6 cmH2O   Tube Comp (%) 80 %   Flow Trigger (LPM) 5 LPM   Pressure Support (cmH2O) 10 cmH2O   ETS (%) 25 %   Tube Type  ET   Tube Size (I.D.) (mm) 7.5   Humidification Heater   Heater Temp 87.8 °F (31 °C)   SPONT/TRC Actuals   Resp Rate (BPM) 22 BPM   VT (mL) 440 mL   MV (Obs) 7.4   MAP (cmH2O) 9.4 cmH2O   Peak Pressure (cmH2O) 18 cmH2O   SPONT/TRC Alarms   High Peak Pressure (cmH2O) 40 cm H2O   Low Pressure (cmH2O) 5 cm H2O   High Resp Rate (BPM) 40 BPM   Low Resp Rate (BPM) 8 BPM   High MV (L/min) 20 L/min   Low MV (L/min) 4 L/min   High Spont VTE (mL) 1000 mL   Low Spont VTE (mL) 250 mL   Apnea Time (s) 20 S   SPONT/TRC Apnea Settings   Resp Rate (BPM) 14 BPM   Apnea Time (S) 20 S   %TI (%)   (1 sec)   Pcontrol (cmH2O) 15 cm H2O   Maintenance   Water bag changed No   Circuit changed No

## 2024-11-27 NOTE — ASSESSMENT & PLAN NOTE
Patient presented with acute encephalopathy and seizure-like activity.  Video EEG concerning for status epilepticus.  MRI brain without contrast was limited by motion but showed restricted diffusion in the left temporal lobe which could be postictal versus infection.  Status post LP 11/12 and CSF studies unremarkable, not consistent with infection with 2 WBCs.  Protein mildly elevated which could be due to seizure activity itself.  ME panel negative, stand alone HSV PCR also negative.  However with new onset seizures and possible abnormality of the temporal lobe HSV remains a possibility as PCR may be negative and CSF bland and early disease.  CSF culture with no growth.  The patient has grown E. coli and ESBL Proteus in urine culture but in the absence of prior systemic signs of infection or localizing symptoms UTI, doubt this is causing status epilepticus. HIV negative. Fever and leukocytosis improved.  Mental status remains poor.  IR unable to perform repeat LP due to technical difficulty.  MRI brain without evidence of acute infarct, intracranial hemorrhage or mass.  Mental status improving, she is now following commands.  Patient completed 14 days of intravenous acyclovir and is now off all antivirals              -No additional IV acyclovir for now              -Monitor CBC and CMP while on acyclovir to monitor for toxicities

## 2024-11-27 NOTE — ASSESSMENT & PLAN NOTE
Patient intubated for airway protection.  Status post bronchoscopy 11/13 for mucous plugging and thick left-sided secretions seen.  Chest x-ray not consistent with pneumonia.  BAL culture shows growth of mixed respiratory viktoriya.  Chest x-ray with bilateral airspace opacities but no clinical evidence of pneumonia, patient has been on stable ventilator settings without significant secretions.  No clinical evidence of pneumonia at this time.   -Ventilator management per critical care team   -No additional antibiotics indicated.

## 2024-11-27 NOTE — CASE MANAGEMENT
Case Management Discharge Planning Note    [facility]  [facility] [id number]  : 1960 Date 2024       Current Admission Date: 2024  Current Admission Diagnosis:Seizure (HCC)   Patient Active Problem List    Diagnosis Date Noted Date Diagnosed    Positive blood culture 2024     Abnormal EKG 11/15/2024     Elevated troponin level not due myocardial infarction 11/15/2024     Bacteriuria 11/15/2024     Shock (HCC) 11/15/2024     Acute hypoxic respiratory failure (HCC) 11/15/2024     Seizure (HCC) 2024     Diabetic nephropathy associated with type 2 diabetes mellitus (HCC) 2024     Coagulopathy ([facility]) 2024     Type 2 diabetes mellitus with hyperglycemia, unspecified whether long term insulin use (HCC) 2024     Chronic anticoagulation 2023     Chronic respiratory failure with hypercapnia ([facility]) 2023     CKD (chronic kidney disease) 2023     Anemia in stage 3b chronic kidney disease  (HCC) 2023     Chronic respiratory failure with hypoxia (HCC) 2023     History of hysterectomy 2022     Panniculitis 2022     Abnormal CT scan 2022     Panniculus 2022     Bilateral pleural effusion 2022     Cardiomegaly 2022     Depression, recurrent ([facility]) 2022     Pre-ulcerative corn or callous 2022     Morbid obesity with BMI of 50.0-59.9, adult (HCC) 2021     Secondary hyperparathyroidism of renal origin (HCC) 2021     GERD (gastroesophageal reflux disease) 2020     Anxiety 2020     Chronic constipation 2020     Paroxysmal atrial fibrillation (HCC) 2020     COPD with asthma (HCC) 2020     Hypothyroid 2020     Cellulitis 09/10/2020     H/O right nephrectomy 09/10/2020     Hyperparathyroidism (HCC) 2020     Dyslipidemia 2019     Hypertensive chronic kidney disease with stage 1 through stage 4 chronic kidney disease, or  unspecified chronic kidney disease 10/04/2018     Persistent proteinuria 10/04/2018     Iron deficiency 10/04/2018       LOS (days): 15  Geometric Mean LOS (GMLOS) (days): 8.7  Days to GMLOS:-5.9     OBJECTIVE:  Risk of Unplanned Readmission Score: 23.3         Current admission status: Inpatient   Preferred Pharmacy:   [facility]/pharmacy #[id number] - [place], [place] - [address]  [address] [address]  Phone: [phone] Fax: [phone]    [facility] [place], [place] - [address]  [address]  [address]  [facility] [facility] [address]  Phone: [phone] Fax: [phone]    Primary Care Provider: TULIO Beaver    Primary Insurance: [facility]  Secondary Insurance: [facility]    DISCHARGE DETAILS:    Other Referral/Resources/Interventions Provided:  Referral Comments: Per chart review/MICU rounds: Pt remains intubated. Spont vent mode.  FiO2 40%. PEEP 6.  Wound care.  CM to follow for dcp needs pending medical course.

## 2024-11-27 NOTE — ASSESSMENT & PLAN NOTE
Lab Results   Component Value Date    EGFR 42 11/27/2024    EGFR 42 11/26/2024    EGFR 41 11/25/2024    CREATININE 1.32 (H) 11/27/2024    CREATININE 1.32 (H) 11/26/2024    CREATININE 1.35 (H) 11/25/2024   Baseline creatinine 1.7-1.9.  Creatinine stable, slightly below baseline.  Dose adjust antibiotics as needed for creatinine clearance.  Monitor creatinine daily

## 2024-11-28 ENCOUNTER — APPOINTMENT (INPATIENT)
Dept: RADIOLOGY | Facility: HOSPITAL | Age: 64
DRG: 097 | End: 2024-11-28
Payer: COMMERCIAL

## 2024-11-28 LAB
ANION GAP SERPL CALCULATED.3IONS-SCNC: 9 MMOL/L (ref 4–13)
ANISOCYTOSIS BLD QL SMEAR: PRESENT
BASOPHILS # BLD MANUAL: 0 THOUSAND/UL (ref 0–0.1)
BASOPHILS NFR MAR MANUAL: 0 % (ref 0–1)
BUN SERPL-MCNC: 26 MG/DL (ref 5–25)
CALCIUM SERPL-MCNC: 9.1 MG/DL (ref 8.4–10.2)
CHLORIDE SERPL-SCNC: 101 MMOL/L (ref 96–108)
CO2 SERPL-SCNC: 33 MMOL/L (ref 21–32)
CREAT SERPL-MCNC: 1.42 MG/DL (ref 0.6–1.3)
EOSINOPHIL # BLD MANUAL: 0 THOUSAND/UL (ref 0–0.4)
EOSINOPHIL NFR BLD MANUAL: 0 % (ref 0–6)
ERYTHROCYTE [DISTWIDTH] IN BLOOD BY AUTOMATED COUNT: 19.9 % (ref 11.6–15.1)
GFR SERPL CREATININE-BSD FRML MDRD: 38 ML/MIN/1.73SQ M
GLUCOSE SERPL-MCNC: 101 MG/DL (ref 65–140)
GLUCOSE SERPL-MCNC: 111 MG/DL (ref 65–140)
GLUCOSE SERPL-MCNC: 124 MG/DL (ref 65–140)
GLUCOSE SERPL-MCNC: 95 MG/DL (ref 65–140)
GLUCOSE SERPL-MCNC: 96 MG/DL (ref 65–140)
HCT VFR BLD AUTO: 23 % (ref 34.8–46.1)
HCT VFR BLD AUTO: 23 % (ref 34.8–46.1)
HCT VFR BLD AUTO: 26.5 % (ref 34.8–46.1)
HCT VFR BLD AUTO: 27 % (ref 34.8–46.1)
HGB BLD-MCNC: 6.7 G/DL (ref 11.5–15.4)
HGB BLD-MCNC: 6.8 G/DL (ref 11.5–15.4)
HGB BLD-MCNC: 8 G/DL (ref 11.5–15.4)
HGB BLD-MCNC: 8.2 G/DL (ref 11.5–15.4)
LACTATE SERPL-SCNC: 0.8 MMOL/L (ref 0.5–2)
LYMPHOCYTES # BLD AUTO: 1.7 THOUSAND/UL (ref 0.6–4.47)
LYMPHOCYTES # BLD AUTO: 20 % (ref 14–44)
MACROCYTES BLD QL AUTO: PRESENT
MAGNESIUM SERPL-MCNC: 1.8 MG/DL (ref 1.9–2.7)
MCH RBC QN AUTO: 32.9 PG (ref 26.8–34.3)
MCHC RBC AUTO-ENTMCNC: 29.6 G/DL (ref 31.4–37.4)
MCV RBC AUTO: 111 FL (ref 82–98)
MONOCYTES # BLD AUTO: 0.64 THOUSAND/UL (ref 0–1.22)
MONOCYTES NFR BLD: 9 % (ref 4–12)
MYELOCYTE ABSOLUTE CT: 0.07 THOUSAND/UL (ref 0–0.1)
MYELOCYTES NFR BLD MANUAL: 1 % (ref 0–1)
NEUTROPHILS # BLD MANUAL: 4.69 THOUSAND/UL (ref 1.85–7.62)
NEUTS BAND NFR BLD MANUAL: 1 % (ref 0–8)
NEUTS SEG NFR BLD AUTO: 65 % (ref 43–75)
PHOSPHATE SERPL-MCNC: 3.4 MG/DL (ref 2.3–4.1)
PLATELET # BLD AUTO: 258 THOUSANDS/UL (ref 149–390)
PLATELET BLD QL SMEAR: ADEQUATE
PMV BLD AUTO: 9.4 FL (ref 8.9–12.7)
POIKILOCYTOSIS BLD QL SMEAR: PRESENT
POLYCHROMASIA BLD QL SMEAR: PRESENT
POTASSIUM SERPL-SCNC: 3.9 MMOL/L (ref 3.5–5.3)
RBC # BLD AUTO: 2.07 MILLION/UL (ref 3.81–5.12)
RBC MORPH BLD: PRESENT
SODIUM SERPL-SCNC: 143 MMOL/L (ref 135–147)
VARIANT LYMPHS # BLD AUTO: 4 %
WBC # BLD AUTO: 7.1 THOUSAND/UL (ref 4.31–10.16)

## 2024-11-28 PROCEDURE — 94669 MECHANICAL CHEST WALL OSCILL: CPT

## 2024-11-28 PROCEDURE — 94760 N-INVAS EAR/PLS OXIMETRY 1: CPT

## 2024-11-28 PROCEDURE — 85014 HEMATOCRIT: CPT

## 2024-11-28 PROCEDURE — 84100 ASSAY OF PHOSPHORUS: CPT

## 2024-11-28 PROCEDURE — 94640 AIRWAY INHALATION TREATMENT: CPT

## 2024-11-28 PROCEDURE — 80048 BASIC METABOLIC PNL TOTAL CA: CPT

## 2024-11-28 PROCEDURE — 85018 HEMOGLOBIN: CPT

## 2024-11-28 PROCEDURE — 99232 SBSQ HOSP IP/OBS MODERATE 35: CPT | Performed by: INTERNAL MEDICINE

## 2024-11-28 PROCEDURE — 85007 BL SMEAR W/DIFF WBC COUNT: CPT

## 2024-11-28 PROCEDURE — 94664 DEMO&/EVAL PT USE INHALER: CPT

## 2024-11-28 PROCEDURE — 82948 REAGENT STRIP/BLOOD GLUCOSE: CPT

## 2024-11-28 PROCEDURE — P9016 RBC LEUKOCYTES REDUCED: HCPCS

## 2024-11-28 PROCEDURE — 94003 VENT MGMT INPAT SUBQ DAY: CPT

## 2024-11-28 PROCEDURE — 83735 ASSAY OF MAGNESIUM: CPT

## 2024-11-28 PROCEDURE — NC001 PR NO CHARGE: Performed by: INTERNAL MEDICINE

## 2024-11-28 PROCEDURE — 74177 CT ABD & PELVIS W/CONTRAST: CPT

## 2024-11-28 PROCEDURE — NC001 PR NO CHARGE

## 2024-11-28 PROCEDURE — 83605 ASSAY OF LACTIC ACID: CPT

## 2024-11-28 PROCEDURE — 85027 COMPLETE CBC AUTOMATED: CPT

## 2024-11-28 PROCEDURE — 99291 CRITICAL CARE FIRST HOUR: CPT | Performed by: INTERNAL MEDICINE

## 2024-11-28 RX ORDER — NOREPINEPHRINE BITARTRATE 1 MG/ML
INJECTION, SOLUTION INTRAVENOUS
Status: DISPENSED
Start: 2024-11-28 | End: 2024-11-28

## 2024-11-28 RX ORDER — FENTANYL CITRATE 50 UG/ML
25 INJECTION, SOLUTION INTRAMUSCULAR; INTRAVENOUS
Refills: 0 | Status: DISCONTINUED | OUTPATIENT
Start: 2024-11-28 | End: 2024-11-30

## 2024-11-28 RX ORDER — OXYCODONE HYDROCHLORIDE 5 MG/1
5 TABLET ORAL EVERY 6 HOURS PRN
Refills: 0 | Status: DISCONTINUED | OUTPATIENT
Start: 2024-11-28 | End: 2024-12-02

## 2024-11-28 RX ORDER — MAGNESIUM SULFATE HEPTAHYDRATE 40 MG/ML
2 INJECTION, SOLUTION INTRAVENOUS ONCE
Status: COMPLETED | OUTPATIENT
Start: 2024-11-28 | End: 2024-11-28

## 2024-11-28 RX ORDER — ACETAMINOPHEN 160 MG/5ML
650 SUSPENSION ORAL EVERY 4 HOURS PRN
Status: DISCONTINUED | OUTPATIENT
Start: 2024-11-28 | End: 2024-12-09 | Stop reason: HOSPADM

## 2024-11-28 RX ADMIN — VALPROATE SODIUM 500 MG: 100 INJECTION, SOLUTION INTRAVENOUS at 09:10

## 2024-11-28 RX ADMIN — FAMOTIDINE 20 MG: 20 TABLET, FILM COATED ORAL at 09:10

## 2024-11-28 RX ADMIN — VALPROATE SODIUM 500 MG: 100 INJECTION, SOLUTION INTRAVENOUS at 15:33

## 2024-11-28 RX ADMIN — MICONAZOLE NITRATE: 20 CREAM TOPICAL at 08:50

## 2024-11-28 RX ADMIN — SODIUM CHLORIDE SOLN NEBU 3% 4 ML: 3 NEBU SOLN at 19:15

## 2024-11-28 RX ADMIN — ENOXAPARIN SODIUM 120 MG: 120 INJECTION SUBCUTANEOUS at 09:10

## 2024-11-28 RX ADMIN — CHLORHEXIDINE GLUCONATE 0.12% ORAL RINSE 15 ML: 1.2 LIQUID ORAL at 09:10

## 2024-11-28 RX ADMIN — NOREPINEPHRINE BITARTRATE 7 MCG/MIN: 1 INJECTION INTRAVENOUS at 08:49

## 2024-11-28 RX ADMIN — SODIUM CHLORIDE SOLN NEBU 3% 4 ML: 3 NEBU SOLN at 13:06

## 2024-11-28 RX ADMIN — VALPROATE SODIUM 500 MG: 100 INJECTION, SOLUTION INTRAVENOUS at 04:42

## 2024-11-28 RX ADMIN — SODIUM CHLORIDE SOLN NEBU 3% 4 ML: 3 NEBU SOLN at 07:58

## 2024-11-28 RX ADMIN — THIAMINE HCL TAB 100 MG 100 MG: 100 TAB at 09:10

## 2024-11-28 RX ADMIN — PROTAMINE SULFATE 50 MG: 10 INJECTION, SOLUTION INTRAVENOUS at 12:13

## 2024-11-28 RX ADMIN — LEVETIRACETAM 750 MG: 750 TABLET, FILM COATED ORAL at 20:18

## 2024-11-28 RX ADMIN — LEVOTHYROXINE SODIUM 100 MCG: 100 TABLET ORAL at 05:00

## 2024-11-28 RX ADMIN — LEVETIRACETAM 750 MG: 750 TABLET, FILM COATED ORAL at 09:10

## 2024-11-28 RX ADMIN — LEVALBUTEROL HYDROCHLORIDE 1.25 MG: 1.25 SOLUTION RESPIRATORY (INHALATION) at 07:58

## 2024-11-28 RX ADMIN — MAGNESIUM SULFATE HEPTAHYDRATE 2 G: 40 INJECTION, SOLUTION INTRAVENOUS at 06:18

## 2024-11-28 RX ADMIN — VALPROATE SODIUM 500 MG: 100 INJECTION, SOLUTION INTRAVENOUS at 21:00

## 2024-11-28 RX ADMIN — MICONAZOLE NITRATE: 20 CREAM TOPICAL at 18:11

## 2024-11-28 RX ADMIN — SERTRALINE HYDROCHLORIDE 50 MG: 50 TABLET ORAL at 09:10

## 2024-11-28 RX ADMIN — CHLORHEXIDINE GLUCONATE 0.12% ORAL RINSE 15 ML: 1.2 LIQUID ORAL at 20:18

## 2024-11-28 RX ADMIN — FENTANYL CITRATE 25 MCG: 50 INJECTION INTRAMUSCULAR; INTRAVENOUS at 02:05

## 2024-11-28 RX ADMIN — ATORVASTATIN CALCIUM 10 MG: 10 TABLET, FILM COATED ORAL at 15:33

## 2024-11-28 RX ADMIN — IOHEXOL 100 ML: 350 INJECTION, SOLUTION INTRAVENOUS at 10:07

## 2024-11-28 RX ADMIN — OXYCODONE HYDROCHLORIDE 5 MG: 5 TABLET ORAL at 22:03

## 2024-11-28 RX ADMIN — LEVALBUTEROL HYDROCHLORIDE 1.25 MG: 1.25 SOLUTION RESPIRATORY (INHALATION) at 13:06

## 2024-11-28 RX ADMIN — Medication 2.5 MG: at 15:42

## 2024-11-28 RX ADMIN — LEVALBUTEROL HYDROCHLORIDE 1.25 MG: 1.25 SOLUTION RESPIRATORY (INHALATION) at 19:15

## 2024-11-28 NOTE — PROGRESS NOTES
Progress Note - Critical Care/ICU   Name: Adelina Soto 64 y.o. female I MRN: [id number]  [facility] Date of Admission: 11/12/2024   Date of Service: 11/28/2024 I Hospital Day: 16       Summary:  Patient is a 64 year old female with history of Afib on Warfarin, HTN, T2DM, morbid obesity, HLD, hypothyroidism, initially presented to [facility] on 11/12 after being found by her partner at home with AMS, seizure-like activity. EMS witnessed tonic-clonic seizure-like activity, treated with 6 mg of Versed. 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 Was subsequently intubated for airway protection and [facility] [place]. Initial LP generally unremarkable apart from RBC's 300's from otherwise atraumatic tap, negative ME panel and HSV. 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     [facility], during evaluation pressors were weaned and AEDs adjusted. Underwent CT head which appeared unchanged, LP on 11/19 with fluoro unsuccessful. Off video EEG 11/20, repeat MRI showed no acute findings. Minimal improvements in neurological exam thus far, does open eyes to voice and blink on command. Ongoing discussion regarding goals of care, tracheostomy/feeding tube in LTAC versus comfort care, POA thus far remains treatment oriented. Had family meeting 11/25, declined tracheostomy and also would not wish to have her remain in an LTAC / nursing home long term. Additionally discussed 11/26 that she would not like to be resuscitated in the event of a cardiac arrest, code status updated to Level 2 DNR/DNI.     Overall very gradual improvements in neurological exam day-to-day, able to breath spontaneously on the ventilator and follow basic commands,  but still too weak to safely extubate, occasionally requiring pressure support due to pulling low volumes.    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, started on acyclovir  Neurology following, vEEG initially showing left frontal seizures, burst suppressed initially, now on Keppra 1g q12h, Phenytoin 100 mg q8h, Depacon 375 mg q6h  LP showed glucose 89, protein 74, 339 RBC's, 2 WBC's, ME panel negative  ID following, completed acyclovir 10 mg/kg every 12 hours through 11/26.  Off vEEG 11/20  Repeat MRI 11/20 unremarkable  More agitation, getting PRN Fentanyl doses; otherwise exam continues to very gradually improve day-to-day     Diagnosis: H/o depression  Plan:  Continue home Zoloft     CV:   Diagnosis: Shock/hypotension, likely in setting of sedation - resolved  Plan:  MAP 65-70, off pressors      Diagnosis: H/o Afib on Warfarin  Plan:  Lopressor 50 mg PO TID; has been meeting hold parameters frequently due to hypotension, however rate relatively controlled < 110bpm  AC with Lovenox 1 mg/kg BID  Consider amiodarone for rate control if persistently > 110 bpm     Pulm:  Diagnosis: Acute hypoxic respiratory failure  Intubated on arrival for airway protection due to AMS  Vent settings: PCMV, 14/12/6/40%  Bronch 11/12 with generalized edematous friable mucosa and moderate thick purulent secretions, culture with mixed respiratory viktoriya  Initial chest x-ray on arrival with mild edema more so on the right, left-sided whiteout likely from mucous plugging and atelectasis, given improvement after bronchoscopy, now continues to have crackles bilaterally  Repeat bronchoscopy 11/20 generally unremarkable  SAT/SBT daily    GI:   Diagnosis: GERD  Plan:  Continue home pepcid    Diagnosis: Rectal bleeding  Noted to have some rectal bleeding with a blood clot near rectal tube site 11/26  Patient does reportedly have  history of internal hemorrhoids, suspect this was likely related to trauma from rectal tube insertion or manipulation  Removed, continue to monitor for any persistent clots/bleeding     :   Diagnosis: CKD  Baseline Cr 1.4-1.8  Remains baseline throughout hospital stay  Monitor renal fx, monitor I/O's, avoid nephrotoxins     F/E/N:   F - none  E - K > 4, Mg > 2, Phos > 3  N - TF with Vital 1.2 @ 55/hr     Heme/Onc:   Diagnosis: Anemia  Hgb 9-10 this admission  Stable thus far, transfuse if < 7     Endo:   Diagnosis: Hypothyroidism  TSH 0.29, T4 1.23  Continue home levothyroxine     Diagnosis: T2DM  Diet controlled, not on medications outpatient  Monitor BG for goal 140-180  Add SSI if needed     ID:   Diagnosis: HSV encephalitis  Initial MRI brain w/ c/f HSV encephalitis based on left temporal enhancement  LP and ME panel neg, unfortunately unable to obtain second CSF analyses for confirmation  ID following, Acyclovir 10 mg/kg q12h through 11/26  Repeat infectious w/u if repeatedly elevated temp     Diagnosis: UTI, possible pneumonia  Urine culture growing E. coli and ESBL proteus  Earlier bronchoscopy with copious purulent secretions, however culture with mixed respiratory viktoriya  Not treated earlier in admission given lack of fever, improvement in leukocytosis     MSK/Skin:   Diagnosis: Pressure wounds  Continue wound care     Disposition: Critical care    ICU Core Measures     Vented Patient  VAP Bundle  VAP bundle ordered     A: Assess, Prevent, and Manage Pain Has pain been assessed? Yes  Need for changes to pain regimen? No   B: Both Spontaneous Awakening Trials (SATs) and Spontaneous Breathing Trials (SBTs) Plan to perform spontaneous awakening trial today? Yes   Plan to perform spontaneous breathing trial today? Yes   Obvious barriers to extubation? Yes   C: Choice of Sedation RASS Goal: 0 Alert and Calm  Need for changes to sedation or analgesia regimen? No   D: Delirium CAM-ICU: Unable to perform secondary  to Acute cognitive dysfunction   E: Early Mobility  Plan for early mobility? NA   F: Family Engagement Plan for family engagement today? Yes       Review of Invasive Devices:    Stringer Plan: Continue secondary to wounds that would be worsened by incontinence        Prophylaxis:  VTE VTE covered by:  enoxaparin, Subcutaneous, 120 mg at 24       Stress Ulcer  covered byfamotidine (PEPCID) 20 mg tablet [[id number]] (Long-Term Med), famotidine (PEPCID) tablet 20 mg [[id number]], pantoprazole (PROTONIX) 40 mg tablet [[id number]] (Long-Term Med)         24 Hour Events : Overnight occasionally agitated, improved with dose of 25 mcg Fentanyl. Pulling lower volumes on spontaneous than previously, improved with some pressure support. Blood pressures remain soft but stable, MAP occasionally mid 50's to 60's.     Subjective   Review of Systems: Review of Systems not obtainable due to Clinical Condition    Objective :                   Vitals I/O      Most Recent Min/Max in 24hrs   Temp 99 °F (37.2 °C) Temp  Min: 98.1 °F (36.7 °C)  Max: 99.7 °F (37.6 °C)   Pulse 97 Pulse  Min: 97  Max: 140   Resp (!) 26 Resp  Min: 13  Max: 58   /61 BP  Min: 71/53  Max: 128/58   O2 Sat 98 % SpO2  Min: 93 %  Max: 100 %      Intake/Output Summary (Last 24 hours) at 2024 0613  Last data filed at 2024 0201  Gross per 24 hour   Intake 2079 ml   Output 2540 ml   Net -461 ml       Diet Enteral/Parenteral; Tube Feeding No Oral Diet; Vital AF 1.2; Cyclic; 55; 22 hours; 200; Every 4 hours    Invasive Monitoring           Physical Exam   Physical Exam  Vitals and nursing note reviewed.   Eyes:      Comments: Pupils were equal, sluggish but responsive to light   HENT:      Head: Normocephalic and atraumatic.   Cardiovascular:      Rate and Rhythm: Regular rhythm. Tachycardia present.   Musculoskeletal:      Right lower le+ Edema present.      Left lower le+ Edema present.   Abdominal:      Palpations: Abdomen is soft.       Tenderness: There is no abdominal tenderness.   Constitutional:       Appearance: She is ill-appearing.      Interventions: She is intubated and restrained. She is not sedated.  Pulmonary:      Effort: Pulmonary effort is normal. She is intubated.   Neurological:      Comments: Opens eyes to voice, able to nod/shake head in response to questions, some movement of all 4 extremities, able to lift head off bed minimally.   Genitourinary/Anorectal:  Stringer present.        Diagnostic Studies        Lab Results: I have reviewed the following results:     Medications:  Scheduled PRN   atorvastatin, 10 mg, Daily With Dinner  chlorhexidine, 15 mL, Q12H LINO  enoxaparin, 1 mg/kg, Q12H LINO  famotidine, 20 mg, Daily  insulin lispro, 2-12 Units, Q6H LINO  levalbuterol, 1.25 mg, TID  levETIRAcetam, 750 mg, Q12H LINO  levothyroxine, 100 mcg, Early Morning  magnesium sulfate, 2 g, Once  metoprolol tartrate, 50 mg, Q8H  EARLINE ANTIFUNGAL, , BID  sertraline, 50 mg, Daily  sodium chloride, 4 mL, TID  thiamine, 100 mg, Daily  valproate sodium, 500 mg, Q6H LINO      acetaminophen, 1,000 mg, Q6H PRN  albuterol, 2.5 mg, Q6H PRN  fentaNYL, 25 mcg, Q1H PRN  LORazepam, 2 mg, Q4H PRN  ondansetron, 4 mg, Q4H PRN       Continuous          Labs:   CBC    Recent Labs     11/27/24  0503 11/27/24  1116 11/28/24  0500   WBC 7.05  --  7.10   HGB 7.0* 7.1* 6.8*   HCT 23.7* 23.8* 23.0*     --  258     BMP    Recent Labs     11/27/24  0503 11/28/24  0500   SODIUM 144 143   K 4.1 3.9    101   CO2 32 33*   AGAP 8 9   BUN 23 26*   CREATININE 1.32* 1.42*   CALCIUM 8.8 9.1       Coags    No recent results     Additional Electrolytes  Recent Labs     11/27/24  0503 11/28/24  0500   MG 1.9 1.8*   PHOS 3.1 3.4          Blood Gas    No recent results    No recent results   LFTs  No recent results    Infectious  No recent results  Glucose  Recent Labs     11/27/24  0503 11/28/24  0500   GLUC 114 124

## 2024-11-28 NOTE — PROCEDURES
Arterial Line Insertion    Date/Time: 11/28/2024 9:13 AM    Performed by: Jose Luis Duff MD  Authorized by: Jose Luis Duff MD    Patient location:  Bedside and ICU  Other Assisting Provider: Yes (comment) (Gregorio Crain DO)    Consent:     Consent obtained:  Written    Consent given by: Partner / POA.    Risks discussed:  Bleeding, repeat procedure, pain, infection and ischemia  Universal protocol:     Procedure explained and questions answered to patient or proxy's satisfaction: yes      Patient identity confirmed:  Hospital-assigned identification number  Indications:     Indications: hemodynamic monitoring, continuous blood pressure monitoring and frequent labs / infusion    Pre-procedure details:     Skin preparation:  Chlorhexidine    Preparation: Patient was prepped and draped in sterile fashion    Anesthesia (see MAR for exact dosages):     Anesthesia method:  None  Procedure details:     Location / Tip of Catheter:  Radial    Laterality:  Right    Kodak's test performed: no      Needle gauge:  20 G    Placement technique:  Ultrasound guided and Seldinger    Ultrasound image availability:  Not saved    Sterile ultrasound techniques: Sterile gel and sterile probe covers were used      Number of attempts:  1    Successful placement: yes      Transducer: waveform confirmed    Post-procedure details:     Post-procedure:  Sterile dressing applied and sutured    CMS:  Unable to assess    Patient tolerance of procedure:  Tolerated well, no immediate complications

## 2024-11-28 NOTE — ASSESSMENT & PLAN NOTE
May be multifactorial related to possible infection, sedating medications.  Off vasopressor support.  Now with recurrent shock that I suspect is secondary to GI bleeding with notable severe anemia.   -Hemodynamic support per critical care team   -Transfusion support

## 2024-11-28 NOTE — PLAN OF CARE
Problem: PAIN - ADULT  Goal: Verbalizes/displays adequate comfort level or baseline comfort level  Description: Interventions:  - Encourage patient to monitor pain and request assistance  - Assess pain using appropriate pain scale  - Administer analgesics based on type and severity of pain and evaluate response  - Implement non-pharmacological measures as appropriate and evaluate response  - Consider cultural and social influences on pain and pain management  - Notify physician/advanced practitioner if interventions unsuccessful or patient reports new pain  Outcome: Progressing     Problem: INFECTION - ADULT  Goal: Absence or prevention of progression during hospitalization  Description: INTERVENTIONS:  - Assess and monitor for signs and symptoms of infection  - Monitor lab/diagnostic results  - Monitor all insertion sites, i.e. indwelling lines, tubes, and drains  - Monitor endotracheal if appropriate and nasal secretions for changes in amount and color  - [place] appropriate cooling/warming therapies per order  - Administer medications as ordered  - Instruct and encourage patient and family to use good hand hygiene technique  - Identify and instruct in appropriate isolation precautions for identified infection/condition  Outcome: Progressing  Goal: Absence of fever/infection during neutropenic period  Description: INTERVENTIONS:  - Monitor WBC    Outcome: Progressing     Problem: SAFETY ADULT  Goal: Patient will remain free of falls  Description: INTERVENTIONS:  - Educate patient/family on patient safety including physical limitations  - Instruct patient to call for assistance with activity   - Consult OT/PT to assist with strengthening/mobility   - Keep Call bell within reach  - Keep bed low and locked with side rails adjusted as appropriate  - Keep care items and personal belongings within reach  - Initiate and maintain comfort rounds  - Make Fall Risk Sign visible to staff  - Apply yellow socks and bracelet  for high fall risk patients  - Consider moving patient to room near nurses station  Outcome: Progressing  Goal: Maintain or return to baseline ADL function  Description: INTERVENTIONS:  -  Assess patient's ability to carry out ADLs; assess patient's baseline for ADL function and identify physical deficits which impact ability to perform ADLs (bathing, care of mouth/teeth, toileting, grooming, dressing, etc.)  - Assess/evaluate cause of self-care deficits   - Assess range of motion  - Assess patient's mobility; develop plan if impaired  - Assess patient's need for assistive devices and provide as appropriate  - Encourage maximum independence but intervene and supervise when necessary  - Involve family in performance of ADLs  - Assess for home care needs following discharge   - Consider OT consult to assist with ADL evaluation and planning for discharge  - Provide patient education as appropriate  Outcome: Progressing  Goal: Maintains/Returns to pre admission functional level  Description: INTERVENTIONS:  - Perform AM-PAC 6 Click Basic Mobility/ Daily Activity assessment daily.  - Set and communicate daily mobility goal to care team and patient/family/caregiver.   - Collaborate with rehabilitation services on mobility goals if consulted  - Perform Range of Motion 3 times a day.  - Reposition patient every 2 hours.  - Record patient progress and toleration of activity level   Outcome: Progressing     Problem: DISCHARGE PLANNING  Goal: Discharge to home or other facility with appropriate resources  Description: INTERVENTIONS:  - Identify barriers to discharge w/patient and caregiver  - Arrange for needed discharge resources and transportation as appropriate  - Identify discharge learning needs (meds, wound care, etc.)  - Arrange for interpretive services to assist at discharge as needed  - Refer to Case Management Department for coordinating discharge planning if the patient needs post-hospital services based on  physician/advanced practitioner order or complex needs related to functional status, cognitive ability, or social support system  Outcome: Progressing     Problem: Knowledge Deficit  Goal: Patient/family/caregiver demonstrates understanding of disease process, treatment plan, medications, and discharge instructions  Description: Complete learning assessment and assess knowledge base.  Interventions:  - Provide teaching at level of understanding  - Provide teaching via preferred learning methods  Outcome: Progressing     Problem: NEUROSENSORY - ADULT  Goal: Achieves stable or improved neurological status  Description: INTERVENTIONS  - Monitor and report changes in neurological status  - Monitor vital signs such as temperature, blood pressure, glucose, and any other labs ordered   - Initiate measures to prevent increased intracranial pressure  - Monitor for seizure activity and implement precautions if appropriate      Outcome: Progressing  Goal: Remains free of injury related to seizures activity  Description: INTERVENTIONS  - Maintain airway, patient safety  and administer oxygen as ordered  - Monitor patient for seizure activity, document and report duration and description of seizure to physician/advanced practitioner  - If seizure occurs,  ensure patient safety during seizure  - Reorient patient post seizure  - Seizure pads on all 4 side rails  - Instruct patient/family to notify RN of any seizure activity including if an aura is experienced  - Instruct patient/family to call for assistance with activity based on nursing assessment  - Administer anti-seizure medications if ordered    Outcome: Progressing  Goal: Achieves maximal functionality and self care  Description: INTERVENTIONS  - Monitor swallowing and airway patency with patient fatigue and changes in neurological status  - Encourage and assist patient to increase activity and self care.   - Encourage visually impaired, hearing impaired and aphasic  patients to use assistive/communication devices  Outcome: Progressing     Problem: CARDIOVASCULAR - ADULT  Goal: Maintains optimal cardiac output and hemodynamic stability  Description: INTERVENTIONS:  - Monitor I/O, vital signs and rhythm  - Monitor for S/S and trends of decreased cardiac output  - Administer and titrate ordered vasoactive medications to optimize hemodynamic stability  - Assess quality of pulses, skin color and temperature  - Assess for signs of decreased coronary artery perfusion  - Instruct patient to report change in severity of symptoms  Outcome: Progressing  Goal: Absence of cardiac dysrhythmias or at baseline rhythm  Description: INTERVENTIONS:  - Continuous cardiac monitoring, vital signs, obtain 12 lead EKG if ordered  - Administer antiarrhythmic and heart rate control medications as ordered  - Monitor electrolytes and administer replacement therapy as ordered  Outcome: Progressing     Problem: RESPIRATORY - ADULT  Goal: Achieves optimal ventilation and oxygenation  Description: INTERVENTIONS:  - Assess for changes in respiratory status  - Assess for changes in mentation and behavior  - Position to facilitate oxygenation and minimize respiratory effort  - Oxygen administered by appropriate delivery if ordered  - Initiate smoking cessation education as indicated  - Encourage broncho-pulmonary hygiene including cough, deep breathe, Incentive Spirometry  - Assess the need for suctioning and aspirate as needed  - Assess and instruct to report SOB or any respiratory difficulty  - Respiratory Therapy support as indicated  Outcome: Progressing     Problem: GASTROINTESTINAL - ADULT  Goal: Minimal or absence of nausea and/or vomiting  Description: INTERVENTIONS:  - Administer IV fluids if ordered to ensure adequate hydration  - Maintain NPO status until nausea and vomiting are resolved  - Nasogastric tube if ordered  - Administer ordered antiemetic medications as needed  - Provide nonpharmacologic  comfort measures as appropriate  - Advance diet as tolerated, if ordered  - Consider nutrition services referral to assist patient with adequate nutrition and appropriate food choices  Outcome: Progressing  Goal: Maintains or returns to baseline bowel function  Description: INTERVENTIONS:  - Assess bowel function  - Encourage oral fluids to ensure adequate hydration  - Administer IV fluids if ordered to ensure adequate hydration  - Administer ordered medications as needed  - Encourage mobilization and activity  - Consider nutritional services referral to assist patient with adequate nutrition and appropriate food choices  Outcome: Progressing  Goal: Maintains adequate nutritional intake  Description: INTERVENTIONS:  - Monitor percentage of each meal consumed  - Identify factors contributing to decreased intake, treat as appropriate  - Assist with meals as needed  - Monitor I&O, weight, and lab values if indicated  - Obtain nutrition services referral as needed  Outcome: Progressing  Goal: Establish and maintain optimal ostomy function  Description: INTERVENTIONS:  - Assess bowel function  - Encourage oral fluids to ensure adequate hydration  - Administer IV fluids if ordered to ensure adequate hydration   - Administer ordered medications as needed  - Encourage mobilization and activity  - Nutrition services referral to assist patient with appropriate food choices  - Assess stoma site  - Consider wound care consult   Outcome: Progressing  Goal: Oral mucous membranes remain intact  Description: INTERVENTIONS  - Assess oral mucosa and hygiene practices  - Implement preventative oral hygiene regimen  - Implement oral medicated treatments as ordered  - Initiate Nutrition services referral as needed  Outcome: Progressing     Problem: GENITOURINARY - ADULT  Goal: Maintains or returns to baseline urinary function  Description: INTERVENTIONS:  - Assess urinary function  - Encourage oral fluids to ensure adequate hydration if  ordered  - Administer IV fluids as ordered to ensure adequate hydration  - Administer ordered medications as needed  - Offer frequent toileting  - Follow urinary retention protocol if ordered  Outcome: Progressing  Goal: Absence of urinary retention  Description: INTERVENTIONS:  - Assess patient’s ability to void and empty bladder  - Monitor I/O  - Bladder scan as needed  - Discuss with physician/AP medications to alleviate retention as needed  - Discuss catheterization for long term situations as appropriate  Outcome: Progressing  Goal: Urinary catheter remains patent  Description: INTERVENTIONS:  - Assess patency of urinary catheter  - If patient has a chronic peace, consider changing catheter if non-functioning  - Follow guidelines for intermittent irrigation of non-functioning urinary catheter  Outcome: Progressing     Problem: METABOLIC, FLUID AND ELECTROLYTES - ADULT  Goal: Electrolytes maintained within normal limits  Description: INTERVENTIONS:  - Monitor labs and assess patient for signs and symptoms of electrolyte imbalances  - Administer electrolyte replacement as ordered  - Monitor response to electrolyte replacements, including repeat lab results as appropriate  - Instruct patient on fluid and nutrition as appropriate  Outcome: Progressing  Goal: Fluid balance maintained  Description: INTERVENTIONS:  - Monitor labs   - Monitor I/O and WT  - Instruct patient on fluid and nutrition as appropriate  - Assess for signs & symptoms of volume excess or deficit  Outcome: Progressing  Goal: Glucose maintained within target range  Description: INTERVENTIONS:  - Monitor Blood Glucose as ordered  - Assess for signs and symptoms of hyperglycemia and hypoglycemia  - Administer ordered medications to maintain glucose within target range  - Assess nutritional intake and initiate nutrition service referral as needed  Outcome: Progressing     Problem: HEMATOLOGIC - ADULT  Goal: Maintains hematologic  stability  Description: INTERVENTIONS  - Assess for signs and symptoms of bleeding or hemorrhage  - Monitor labs  - Administer supportive blood products/factors as ordered and appropriate  Outcome: Progressing     Problem: MUSCULOSKELETAL - ADULT  Goal: Maintain or return mobility to safest level of function  Description: INTERVENTIONS:  - Assess patient's ability to carry out ADLs; assess patient's baseline for ADL function and identify physical deficits which impact ability to perform ADLs (bathing, care of mouth/teeth, toileting, grooming, dressing, etc.)  - Assess/evaluate cause of self-care deficits   - Assess range of motion  - Assess patient's mobility  - Assess patient's need for assistive devices and provide as appropriate  - Encourage maximum independence but intervene and supervise when necessary  - Involve family in performance of ADLs  - Assess for home care needs following discharge   - Consider OT consult to assist with ADL evaluation and planning for discharge  - Provide patient education as appropriate  Outcome: Progressing  Goal: Maintain proper alignment of affected body part  Description: INTERVENTIONS:  - Support, maintain and protect limb and body alignment  - Provide patient/ family with appropriate education  Outcome: Progressing     Problem: Nutrition/Hydration-ADULT  Goal: Nutrient/Hydration intake appropriate for improving, restoring or maintaining nutritional needs  Description: Monitor and assess patient's nutrition/hydration status for malnutrition. Collaborate with interdisciplinary team and initiate plan and interventions as ordered.  Monitor patient's weight and dietary intake as ordered or per policy. Utilize nutrition screening tool and intervene as necessary. Determine patient's food preferences and provide high-protein, high-caloric foods as appropriate.     INTERVENTIONS:  - Monitor oral intake, urinary output, labs, and treatment plans  - Assess nutrition and hydration status  and recommend course of action  - Evaluate amount of meals eaten  - Assist patient with eating if necessary   - Allow adequate time for meals  - Recommend/ encourage appropriate diets, oral nutritional supplements, and vitamin/mineral supplements  - Order, calculate, and assess calorie counts as needed  - Recommend, monitor, and adjust tube feedings and TPN/PPN based on assessed needs  - Assess need for intravenous fluids  - Provide specific nutrition/hydration education as appropriate  - Include patient/family/caregiver in decisions related to nutrition  Outcome: Progressing     Problem: Prexisting or High Potential for Compromised Skin Integrity  Goal: Skin integrity is maintained or improved  Description: INTERVENTIONS:  - Identify patients at risk for skin breakdown  - Assess and monitor skin integrity  - Assess and monitor nutrition and hydration status  - Monitor labs   - Assess for incontinence   - Turn and reposition patient  - Assist with mobility/ambulation  - Relieve pressure over bony prominences  - Avoid friction and shearing  - Provide appropriate hygiene as needed including keeping skin clean and dry  - Evaluate need for skin moisturizer/barrier cream  - Collaborate with interdisciplinary team   - Patient/family teaching  - Consider wound care consult   Outcome: Progressing

## 2024-11-28 NOTE — ASSESSMENT & PLAN NOTE
Lab Results   Component Value Date    EGFR 38 11/28/2024    EGFR 42 11/27/2024    EGFR 42 11/26/2024    CREATININE 1.42 (H) 11/28/2024    CREATININE 1.32 (H) 11/27/2024    CREATININE 1.32 (H) 11/26/2024   Baseline creatinine 1.7-1.9.  Creatinine stable, slightly below baseline.  Dose adjust antibiotics as needed for creatinine clearance.  Monitor creatinine daily

## 2024-11-28 NOTE — PROGRESS NOTES
Progress Note - Infectious Disease   Name: Adelina Soto 64 y.o. female I MRN: [id number]  Unit/Bed#: MICU 01 I Date of Admission: 11/12/2024   Date of Service: 11/28/2024 I Hospital Day: 16    Assessment & Plan  Seizure (HCC)   Patient presented with acute encephalopathy and seizure-like activity.  Video EEG concerning for status epilepticus.  MRI brain without contrast was limited by motion but showed restricted diffusion in the left temporal lobe which could be postictal versus infection.  Status post LP 11/12 and CSF studies unremarkable, not consistent with infection with 2 WBCs.  Protein mildly elevated which could be due to seizure activity itself.  ME panel negative, stand alone HSV PCR also negative.  However with new onset seizures and possible abnormality of the temporal lobe HSV remains a possibility as PCR may be negative and CSF bland and early disease.  CSF culture with no growth.  The patient has grown E. coli and ESBL Proteus in urine culture but in the absence of prior systemic signs of infection or localizing symptoms UTI, doubt this is causing status epilepticus. HIV negative. Fever and leukocytosis improved.  Mental status remains poor.  IR unable to perform repeat LP due to technical difficulty.  MRI brain without evidence of acute infarct, intracranial hemorrhage or mass.  Mental status improving, she is now following commands.  Patient completed 14 days of intravenous acyclovir and is now off all antivirals              -No additional IV acyclovir for now  -Antiepileptic drugs as per neurology  -Monitor cognition  Bacteriuria  UA with pyuria and urine culture growing E. coli and ESBL Proteus.  No symptoms of infection prior to admission.  Unlikely cause of status epilepticus however in absence of clear cause of seizure activity treated for possible cystitis with 3 days zosyn   -monitor off antibiotics   Shock (HCC)  May be multifactorial related to possible infection, sedating  medications.  Off vasopressor support.  Now with recurrent shock that I suspect is secondary to GI bleeding with notable severe anemia.   -Hemodynamic support per critical care team   -Transfusion support  Acute hypoxic respiratory failure ([facility])  Patient intubated for airway protection.  Status post bronchoscopy 11/13 for mucous plugging and thick left-sided secretions seen.  Chest x-ray not consistent with pneumonia.  BAL culture shows growth of mixed respiratory viktoriya.  Chest x-ray with bilateral airspace opacities but no clinical evidence of pneumonia, patient has been on stable ventilator settings without significant secretions.  No clinical evidence of pneumonia at this time.  Repeat chest x-ray with some left basilar consolidation consistent with atelectasis and possible effusion.   -Ventilator management per critical care team   -No additional antibiotics indicated.  Bacteremia due to Staphylococcus  1 of 2 blood cultures collected 11/24 are growing staph epidermidis. Likely contaminant. Monitor off additional antibiotics   Morbid obesity with BMI of 50.0-59.9, adult ([facility])  Affects antimicrobial dosing  CKD (chronic kidney disease)  Lab Results   Component Value Date    EGFR 38 11/28/2024    EGFR 42 11/27/2024    EGFR 42 11/26/2024    CREATININE 1.42 (H) 11/28/2024    CREATININE 1.32 (H) 11/27/2024    CREATININE 1.32 (H) 11/26/2024   Baseline creatinine 1.7-1.9.  Creatinine stable, slightly below baseline.  Dose adjust antibiotics as needed for creatinine clearance.  Monitor creatinine daily  Type 2 diabetes mellitus with hyperglycemia, unspecified whether long term insulin use ([facility])  Lab Results   Component Value Date    HGBA1C 5.7 (H) 11/13/2024   Diabetes well-controlled.  Glycemic management per primary team    I have discussed with the critical care service the above plan to monitor off all antimicrobials.  The critical care service agrees with the plan.    Antibiotics:  Off IV acyclovir 2    Subjective    Patient without fever.  Became hypotensive with evidence of GI bleeding.  Still requiring ventilatory support.  Is now back on vasopressors.  No reported vomiting.    Objective :  Temp:  [95 °F (35 °C)-99.7 °F (37.6 °C)] 99.1 °F (37.3 °C)  HR:  [] 97  BP: ()/(29-74) 79/54  Resp:  [13-45] 18  SpO2:  [93 %-100 %] 98 %  O2 Device: Ventilator    General: Resting on the ventilator no acute distress  Psychiatric: Somnolent on the ventilator  Pulmonary:  Normal respiratory excursion without accessory muscle use  Abdomen:  Soft, nontender  Extremities:  No edema  Skin:  No rashes      Lab Results: I have reviewed the following results:  Results from last 7 days   Lab Units 11/28/24  0814 11/28/24  0500 11/27/24  1116 11/27/24  0503 11/26/24  1902 11/26/24  0454   WBC Thousand/uL  --  7.10  --  7.05  --  7.52   HEMOGLOBIN g/dL 6.7* 6.8* 7.1* 7.0*   < > 8.8*   PLATELETS Thousands/uL  --  258  --  257  --  300    < > = values in this interval not displayed.     Results from last 7 days   Lab Units 11/28/24  0500 11/27/24  0503 11/26/24  0454   SODIUM mmol/L 143 144 143   POTASSIUM mmol/L 3.9 4.1 4.2   CHLORIDE mmol/L 101 104 102   CO2 mmol/L 33* 32 34*   BUN mg/dL 26* 23 23   CREATININE mg/dL 1.42* 1.32* 1.32*   EGFR ml/min/1.73sq m 38 42 42   CALCIUM mg/dL 9.1 8.8 9.3     Results from last 7 days   Lab Units 11/24/24  0032   BLOOD CULTURE  No Growth After 4 Days.  Staphylococcus epidermidis*   GRAM STAIN RESULT  Gram positive cocci in clusters*                     Imaging Results Review: I personally reviewed the following image studies in PACS and associated radiology reports: chest xray. My interpretation of the radiology images/reports is: Left basilar consolidation suggesting atelectasis/effusion.

## 2024-11-28 NOTE — ASSESSMENT & PLAN NOTE
Patient intubated for airway protection.  Status post bronchoscopy 11/13 for mucous plugging and thick left-sided secretions seen.  Chest x-ray not consistent with pneumonia.  BAL culture shows growth of mixed respiratory viktoriya.  Chest x-ray with bilateral airspace opacities but no clinical evidence of pneumonia, patient has been on stable ventilator settings without significant secretions.  No clinical evidence of pneumonia at this time.  Repeat chest x-ray with some left basilar consolidation consistent with atelectasis and possible effusion.   -Ventilator management per critical care team   -No additional antibiotics indicated.

## 2024-11-28 NOTE — ASSESSMENT & PLAN NOTE
Patient presented with acute encephalopathy and seizure-like activity.  Video EEG concerning for status epilepticus.  MRI brain without contrast was limited by motion but showed restricted diffusion in the left temporal lobe which could be postictal versus infection.  Status post LP 11/12 and CSF studies unremarkable, not consistent with infection with 2 WBCs.  Protein mildly elevated which could be due to seizure activity itself.  ME panel negative, stand alone HSV PCR also negative.  However with new onset seizures and possible abnormality of the temporal lobe HSV remains a possibility as PCR may be negative and CSF bland and early disease.  CSF culture with no growth.  The patient has grown E. coli and ESBL Proteus in urine culture but in the absence of prior systemic signs of infection or localizing symptoms UTI, doubt this is causing status epilepticus. HIV negative. Fever and leukocytosis improved.  Mental status remains poor.  IR unable to perform repeat LP due to technical difficulty.  MRI brain without evidence of acute infarct, intracranial hemorrhage or mass.  Mental status improving, she is now following commands.  Patient completed 14 days of intravenous acyclovir and is now off all antivirals              -No additional IV acyclovir for now  -Antiepileptic drugs as per neurology  -Monitor cognition

## 2024-11-28 NOTE — RESPIRATORY THERAPY NOTE
RT Ventilator Management Note  Adelina Soto 64 y.o. female MRN: [id number]  Unit/Bed#: MICU 01 Encounter: [id number]      Daily Screen         11/26/2024  1032 11/27/2024  0752          Patient safety screen outcome:: Passed Passed      Not Ready for Weaning due to:: Underline problem not resolved Underline problem not resolved      Spont breathing trial % for 30 min: -- No                Physical Exam:   Assessment Type: (P) Assess only  General Appearance: (P) Sleeping  Respiratory Pattern: (P) Assisted  Chest Assessment: (P) Chest expansion symmetrical  Bilateral Breath Sounds: (P) Diminished, Coarse  O2 Device: (P) vent      Resp Comments: (P) Pt stalbe overnight on current P-CMV settings. Will cont to monitor pt per protocol while on vent.

## 2024-11-29 LAB
ABO GROUP BLD BPU: NORMAL
ANION GAP SERPL CALCULATED.3IONS-SCNC: 9 MMOL/L (ref 4–13)
BACTERIA BLD CULT: NORMAL
BASE EXCESS BLDA CALC-SCNC: 4.9 MMOL/L
BODY TEMPERATURE: 97.9 DEGREES FEHRENHEIT
BPU ID: NORMAL
BUN SERPL-MCNC: 26 MG/DL (ref 5–25)
CALCIUM SERPL-MCNC: 9.1 MG/DL (ref 8.4–10.2)
CHLORIDE SERPL-SCNC: 103 MMOL/L (ref 96–108)
CO2 SERPL-SCNC: 32 MMOL/L (ref 21–32)
CREAT SERPL-MCNC: 1.28 MG/DL (ref 0.6–1.3)
CROSSMATCH: NORMAL
ERYTHROCYTE [DISTWIDTH] IN BLOOD BY AUTOMATED COUNT: 20.8 % (ref 11.6–15.1)
GFR SERPL CREATININE-BSD FRML MDRD: 43 ML/MIN/1.73SQ M
GLUCOSE SERPL-MCNC: 106 MG/DL (ref 65–140)
GLUCOSE SERPL-MCNC: 114 MG/DL (ref 65–140)
GLUCOSE SERPL-MCNC: 115 MG/DL (ref 65–140)
GLUCOSE SERPL-MCNC: 85 MG/DL (ref 65–140)
GLUCOSE SERPL-MCNC: 95 MG/DL (ref 65–140)
HCO3 BLDA-SCNC: 30.6 MMOL/L (ref 22–28)
HCT VFR BLD AUTO: 26.1 % (ref 34.8–46.1)
HGB BLD-MCNC: 7.8 G/DL (ref 11.5–15.4)
MAGNESIUM SERPL-MCNC: 2.2 MG/DL (ref 1.9–2.7)
MCH RBC QN AUTO: 32.8 PG (ref 26.8–34.3)
MCHC RBC AUTO-ENTMCNC: 29.9 G/DL (ref 31.4–37.4)
MCV RBC AUTO: 110 FL (ref 82–98)
NRBC BLD AUTO-RTO: 1 /100 WBCS
O2 CT BLDA-SCNC: 12.5 ML/DL (ref 16–23)
OXYHGB MFR BLDA: 96.7 % (ref 94–97)
PCO2 BLDA: 51.3 MM HG (ref 36–44)
PCO2 TEMP ADJ BLDA: 50.4 MM HG (ref 36–44)
PH BLD: 7.4 [PH] (ref 7.35–7.45)
PH BLDA: 7.39 [PH] (ref 7.35–7.45)
PHOSPHATE SERPL-MCNC: 4.4 MG/DL (ref 2.3–4.1)
PLATELET # BLD AUTO: 334 THOUSANDS/UL (ref 149–390)
PMV BLD AUTO: 9.1 FL (ref 8.9–12.7)
PO2 BLD: 113.3 MM HG (ref 75–129)
PO2 BLDA: 115.8 MM HG (ref 75–129)
POTASSIUM SERPL-SCNC: 4.1 MMOL/L (ref 3.5–5.3)
PS CM H2O: 6
PS VENT FIO2: 40
PS VENT PEEP: 6
RBC # BLD AUTO: 2.38 MILLION/UL (ref 3.81–5.12)
SODIUM SERPL-SCNC: 144 MMOL/L (ref 135–147)
SPECIMEN SOURCE: ABNORMAL
UNIT DISPENSE STATUS: NORMAL
UNIT PRODUCT CODE: NORMAL
UNIT PRODUCT VOLUME: 350 ML
UNIT RH: NORMAL
VENT - PS: ABNORMAL
WBC # BLD AUTO: 7.17 THOUSAND/UL (ref 4.31–10.16)

## 2024-11-29 PROCEDURE — 87205 SMEAR GRAM STAIN: CPT

## 2024-11-29 PROCEDURE — 85027 COMPLETE CBC AUTOMATED: CPT

## 2024-11-29 PROCEDURE — 94003 VENT MGMT INPAT SUBQ DAY: CPT

## 2024-11-29 PROCEDURE — 94760 N-INVAS EAR/PLS OXIMETRY 1: CPT

## 2024-11-29 PROCEDURE — 94664 DEMO&/EVAL PT USE INHALER: CPT

## 2024-11-29 PROCEDURE — 87070 CULTURE OTHR SPECIMN AEROBIC: CPT

## 2024-11-29 PROCEDURE — 83735 ASSAY OF MAGNESIUM: CPT

## 2024-11-29 PROCEDURE — 80048 BASIC METABOLIC PNL TOTAL CA: CPT

## 2024-11-29 PROCEDURE — 94669 MECHANICAL CHEST WALL OSCILL: CPT

## 2024-11-29 PROCEDURE — 99291 CRITICAL CARE FIRST HOUR: CPT | Performed by: INTERNAL MEDICINE

## 2024-11-29 PROCEDURE — 82805 BLOOD GASES W/O2 SATURATION: CPT

## 2024-11-29 PROCEDURE — 99233 SBSQ HOSP IP/OBS HIGH 50: CPT | Performed by: INTERNAL MEDICINE

## 2024-11-29 PROCEDURE — 94640 AIRWAY INHALATION TREATMENT: CPT

## 2024-11-29 PROCEDURE — 84100 ASSAY OF PHOSPHORUS: CPT

## 2024-11-29 PROCEDURE — 82948 REAGENT STRIP/BLOOD GLUCOSE: CPT

## 2024-11-29 RX ORDER — LIDOCAINE HYDROCHLORIDE 10 MG/ML
10 INJECTION, SOLUTION EPIDURAL; INFILTRATION; INTRACAUDAL; PERINEURAL ONCE
Status: COMPLETED | OUTPATIENT
Start: 2024-11-29 | End: 2024-11-29

## 2024-11-29 RX ORDER — DEXAMETHASONE SODIUM PHOSPHATE 4 MG/ML
4 INJECTION, SOLUTION INTRA-ARTICULAR; INTRALESIONAL; INTRAMUSCULAR; INTRAVENOUS; SOFT TISSUE EVERY 6 HOURS SCHEDULED
Status: COMPLETED | OUTPATIENT
Start: 2024-11-29 | End: 2024-11-30

## 2024-11-29 RX ADMIN — LEVOTHYROXINE SODIUM 100 MCG: 100 TABLET ORAL at 04:42

## 2024-11-29 RX ADMIN — VALPROATE SODIUM 500 MG: 100 INJECTION, SOLUTION INTRAVENOUS at 16:19

## 2024-11-29 RX ADMIN — LIDOCAINE HYDROCHLORIDE 10 ML: 10 INJECTION, SOLUTION EPIDURAL; INFILTRATION; INTRACAUDAL at 21:05

## 2024-11-29 RX ADMIN — SERTRALINE HYDROCHLORIDE 50 MG: 50 TABLET ORAL at 08:55

## 2024-11-29 RX ADMIN — CHLORHEXIDINE GLUCONATE 0.12% ORAL RINSE 15 ML: 1.2 LIQUID ORAL at 20:15

## 2024-11-29 RX ADMIN — LEVALBUTEROL HYDROCHLORIDE 1.25 MG: 1.25 SOLUTION RESPIRATORY (INHALATION) at 07:00

## 2024-11-29 RX ADMIN — ACETAMINOPHEN 650 MG: 650 SUSPENSION ORAL at 12:25

## 2024-11-29 RX ADMIN — SODIUM CHLORIDE SOLN NEBU 3% 4 ML: 3 NEBU SOLN at 19:10

## 2024-11-29 RX ADMIN — DEXAMETHASONE SODIUM PHOSPHATE 4 MG: 4 INJECTION INTRA-ARTICULAR; INTRALESIONAL; INTRAMUSCULAR; INTRAVENOUS; SOFT TISSUE at 13:21

## 2024-11-29 RX ADMIN — LEVALBUTEROL HYDROCHLORIDE 1.25 MG: 1.25 SOLUTION RESPIRATORY (INHALATION) at 12:52

## 2024-11-29 RX ADMIN — VANCOMYCIN HYDROCHLORIDE 2000 MG: 1 INJECTION, POWDER, LYOPHILIZED, FOR SOLUTION INTRAVENOUS at 20:15

## 2024-11-29 RX ADMIN — FAMOTIDINE 20 MG: 20 TABLET, FILM COATED ORAL at 08:55

## 2024-11-29 RX ADMIN — VALPROATE SODIUM 500 MG: 100 INJECTION, SOLUTION INTRAVENOUS at 21:06

## 2024-11-29 RX ADMIN — VALPROATE SODIUM 500 MG: 100 INJECTION, SOLUTION INTRAVENOUS at 09:07

## 2024-11-29 RX ADMIN — ATORVASTATIN CALCIUM 10 MG: 10 TABLET, FILM COATED ORAL at 16:20

## 2024-11-29 RX ADMIN — SODIUM CHLORIDE SOLN NEBU 3% 4 ML: 3 NEBU SOLN at 07:01

## 2024-11-29 RX ADMIN — LEVETIRACETAM 750 MG: 750 TABLET, FILM COATED ORAL at 20:15

## 2024-11-29 RX ADMIN — LEVALBUTEROL HYDROCHLORIDE 1.25 MG: 1.25 SOLUTION RESPIRATORY (INHALATION) at 19:10

## 2024-11-29 RX ADMIN — MICONAZOLE NITRATE: 20 CREAM TOPICAL at 17:31

## 2024-11-29 RX ADMIN — VALPROATE SODIUM 500 MG: 100 INJECTION, SOLUTION INTRAVENOUS at 04:32

## 2024-11-29 RX ADMIN — LEVETIRACETAM 750 MG: 750 TABLET, FILM COATED ORAL at 08:55

## 2024-11-29 RX ADMIN — SODIUM CHLORIDE SOLN NEBU 3% 4 ML: 3 NEBU SOLN at 12:53

## 2024-11-29 RX ADMIN — DEXAMETHASONE SODIUM PHOSPHATE 4 MG: 4 INJECTION INTRA-ARTICULAR; INTRALESIONAL; INTRAMUSCULAR; INTRAVENOUS; SOFT TISSUE at 23:48

## 2024-11-29 RX ADMIN — FENTANYL CITRATE 25 MCG: 50 INJECTION INTRAMUSCULAR; INTRAVENOUS at 21:05

## 2024-11-29 RX ADMIN — CHLORHEXIDINE GLUCONATE 0.12% ORAL RINSE 15 ML: 1.2 LIQUID ORAL at 09:00

## 2024-11-29 RX ADMIN — DEXAMETHASONE SODIUM PHOSPHATE 4 MG: 4 INJECTION INTRA-ARTICULAR; INTRALESIONAL; INTRAMUSCULAR; INTRAVENOUS; SOFT TISSUE at 17:30

## 2024-11-29 RX ADMIN — THIAMINE HCL TAB 100 MG 100 MG: 100 TAB at 08:55

## 2024-11-29 RX ADMIN — MICONAZOLE NITRATE: 20 CREAM TOPICAL at 09:00

## 2024-11-29 NOTE — UTILIZATION REVIEW
Continued Stay Review    Date: 11/29                          Current Patient Class: Inpatient  Current Level of Care: CC    HPI:64 y.o. female initially admitted on 11/12     Assessment/Plan: Overnight occasionally agitated, improved with dose of 25 mcg Fentanyl. Pulling lower volumes on spontaneous than previously, improved with some pressure support. Blood pressures remain soft but stable, MAP occasionally mid 50's to 60's. Transfused 1u pRBC's 11/28 due to drop to 6.7, found to have small hemmorhage in the left paraspinal muscle   Pt more alert today and able to raise her head off the bed, doing well on spontaneous 8/6 with occasional lower volumes.  Hgb down to 7.8 today. Cont levo gtt, MAP goal 65-70. Mon Hgb.  Cont Iv Depakote. SAT/SBT daily. Will consider extubating to BiPAP today. Pepcid. ID ff.     Nutrition Notes: Noted TF on hold since yesterday AM. Resume TF as medically able if pt to remain intubated. Recommend adding one packet Prosource Liquid Protein daily. Additional free water flushes per critical care; suggest at least 30mL q4 hrs to help maintain tube patency.     Medications:   Scheduled Medications:  atorvastatin, 10 mg, Per NG Tube, Daily With Dinner  chlorhexidine, 15 mL, Mouth/Throat, Q12H LINO  dexamethasone, 4 mg, Intravenous, Q6H LINO  [Held by provider] enoxaparin, 1 mg/kg, Subcutaneous, Q12H LINO  famotidine, 20 mg, Oral, Daily  levalbuterol, 1.25 mg, Nebulization, TID  levETIRAcetam, 750 mg, Per G Tube, Q12H LINO  levothyroxine, 100 mcg, Per NG Tube, Early Morning  metoprolol tartrate, 50 mg, Oral, Q8H  EARLINE ANTIFUNGAL, , Topical, BID  sertraline, 50 mg, Per NG Tube, Daily  sodium chloride, 4 mL, Nebulization, TID  thiamine, 100 mg, Per NG Tube, Daily  valproate sodium, 500 mg, Intravenous, Q6H LINO      Continuous IV Infusions:  norepinephrine, 1-30 mcg/min, Intravenous, Titrated      PRN Meds:  acetaminophen, 650 mg, Oral, Q4H PRN 11/29 x 1  albuterol, 2.5 mg, Nebulization, Q6H  PRN  fentaNYL, 25 mcg, Intravenous, Q1H PRN  LORazepam, 2 mg, Intravenous, Q4H PRN  ondansetron, 4 mg, Intravenous, Q4H PRN  oxyCODONE, 5 mg, Oral, Q6H PRN 11/29 x 1  oxyCODONE, 2.5 mg, Oral, Q6H PRN      Discharge Plan: TBD    Vital Signs (last 3 days)       Date/Time Temp Pulse Resp BP MAP (mmHg) Arterial Line BP MAP SpO2 FiO2 (%) O2 Device Patient Position - Orthostatic VS [place] Coma Scale Score Pain    11/29/24 1400 97.5 °F (36.4 °C) 90 24 100/49 70 95/48 65 mmHg 97 % -- -- -- -- --    11/29/24 1300 97.7 °F (36.5 °C) 90 22 -- -- 95/45 64 mmHg 92 % -- -- -- -- --    11/29/24 1252 -- -- -- -- -- -- -- 94 % -- -- -- -- --    11/29/24 1225 -- -- -- -- -- -- -- -- -- -- -- 11 5    11/29/24 1200 98.1 °F (36.7 °C) 93 24 -- -- 96/53 70 mmHg 93 % -- Ventilator -- -- --    11/29/24 1100 97.9 °F (36.6 °C) 91 22 -- -- 106/53 72 mmHg 98 % -- -- -- -- --    11/29/24 1033 -- -- -- -- -- -- -- 97 % -- -- -- -- --    11/29/24 1000 97.9 °F (36.6 °C) 101 34 -- -- 133/69 89 mmHg 93 % -- -- -- -- --    11/29/24 0900 97.5 °F (36.4 °C) 86 21 -- -- 104/50 69 mmHg 93 % -- -- -- -- --    11/29/24 0845 -- -- -- -- -- -- -- -- -- -- -- 11 --    11/29/24 0800 97.2 °F (36.2 °C) 84 18 -- -- 101/48 67 mmHg 100 % -- Ventilator -- -- --    11/29/24 0702 97.3 °F (36.3 °C) 85 21 131/59 85 -- -- 99 % -- Ventilator Lying -- --    11/29/24 0700 97.3 °F (36.3 °C) 83 17 -- -- 94/46 63 mmHg 97 % -- -- -- -- --    11/29/24 0600 97.3 °F (36.3 °C) 83 22 -- -- 100/49 67 mmHg 94 % -- -- -- -- --    11/29/24 0500 97.5 °F (36.4 °C) 85 18 -- -- 100/49 67 mmHg 98 % -- -- -- -- --    11/29/24 0428 97.5 °F (36.4 °C) 89 27 -- -- 120/64 84 mmHg 95 % -- -- -- -- --    11/29/24 0408 -- -- -- -- -- -- -- -- -- -- -- 11 --    11/29/24 0400 97.5 °F (36.4 °C) 82 18 -- -- 105/57 74 mmHg 95 % -- -- -- -- --    11/29/24 0300 97.7 °F (36.5 °C) 91 20 -- -- 129/67 88 mmHg 99 % -- -- -- -- --    11/29/24 0200 97.9 °F (36.6 °C) 82 20 -- -- 97/50 66 mmHg 92 % -- -- -- -- --     11/29/24 0103 97.5 °F (36.4 °C) 86 19 -- -- 96/49 66 mmHg 98 % -- -- -- -- --    11/29/24 0000 97.9 °F (36.6 °C) 94 21 -- -- 107/55 74 mmHg 96 % -- -- -- 11 --    11/28/24 2305 98.4 °F (36.9 °C) 94 18 -- -- 99/50 67 mmHg 93 % -- -- -- -- --    11/28/24 2227 98.4 °F (36.9 °C) 96 14 -- -- 86/46 61 mmHg 97 % -- -- -- -- --    11/28/24 2200 98.6 °F (37 °C) 105 18 -- -- 108/54 75 mmHg 97 % -- -- -- -- --    11/28/24 2100 98.6 °F (37 °C) 92 20 -- -- 106/48 66 mmHg 91 % -- -- -- -- --    11/28/24 2041 98.4 °F (36.9 °C) 96 22 -- -- 98/47 63 mmHg 90 % -- -- -- -- --    11/28/24 2021 98.2 °F (36.8 °C) 94 19 -- -- 95/45 61 mmHg 90 % -- -- -- -- --    11/28/24 2000 98.2 °F (36.8 °C) 98 23 -- -- 90/41 58 mmHg 94 % -- -- -- -- --    11/28/24 1934 -- -- -- -- -- -- -- -- -- -- -- 11 -- 11/28/24 1915 -- -- -- -- -- -- -- -- 40 Ventilator -- -- --    11/28/24 1900 98.6 °F (37 °C) 97 27 -- -- 122/59 80 mmHg 97 % -- -- -- -- --    11/28/24 1800 98.6 °F (37 °C) 91 18 -- -- 111/48 68 mmHg 98 % -- -- -- -- --    11/28/24 1700 98.8 °F (37.1 °C) 94 21 -- -- 114/49 69 mmHg 99 % -- -- -- -- --    11/28/24 1600 99 °F (37.2 °C) 90 21 -- -- 117/51 71 mmHg 99 % -- Ventilator -- 11 --    11/28/24 1500 99 °F (37.2 °C) 94 22 -- -- 107/46 64 mmHg 95 % -- -- -- -- --    11/28/24 1400 99.1 °F (37.3 °C) 94 23 -- -- 94/43 61 mmHg 98 % -- -- -- -- --    11/28/24 1306 -- -- -- -- -- -- -- 100 % -- -- -- -- --    11/28/24 1300 99.1 °F (37.3 °C) 87 21 -- -- 116/55 73 mmHg 100 % -- -- -- -- --    11/28/24 1200 99 °F (37.2 °C) 91 19 -- -- 116/51 71 mmHg 100 % -- Ventilator -- -- --    11/28/24 1150 -- -- -- -- -- -- -- -- -- -- -- 11 --    11/28/24 1100 99 °F (37.2 °C) 91 15 123/54 76 123/54 76 mmHg 99 % -- -- -- -- --    11/28/24 1023 -- -- -- -- -- -- -- 98 % -- -- -- -- --    11/28/24 1000 -- 92 24 -- -- 123/58 78 mmHg 99 % -- -- -- -- --    11/28/24 0952 -- 92 17 112/54 -- -- -- -- -- -- -- -- --    11/28/24 0948 99 °F (37.2 °C) 98 20 134/59 -- 123/57  77 mmHg 99 % -- -- -- -- --    11/28/24 0930 99 °F (37.2 °C) 96 15 -- -- 102/44 60 mmHg 100 % -- -- -- -- --    11/28/24 0925 98.8 °F (37.1 °C) 97 15 111/49 -- 107/47 64 mmHg 99 % -- -- -- -- --    11/28/24 0900 99.1 °F (37.3 °C) 99 17 -- -- 119/60 79 mmHg 99 % -- -- -- -- --    11/28/24 0836 99.1 °F (37.3 °C) 97 18 79/54 62 -- -- 98 % -- -- -- -- --    11/28/24 0825 99 °F (37.2 °C) 102 20 77/46 56 -- -- 98 % -- -- -- -- --    11/28/24 0819 99 °F (37.2 °C) 100 17 52/30 37 -- -- 98 % -- -- -- -- --    11/28/24 0814 99 °F (37.2 °C) 104 21 58/34 42 -- -- 98 % -- -- -- -- --    11/28/24 0813 -- -- -- -- -- -- -- 97 % -- -- -- -- --    11/28/24 0812 99 °F (37.2 °C) 107 20 58/38 43 -- -- 98 % -- -- -- -- --    11/28/24 0810 98.8 °F (37.1 °C) 105 29 57/36 43 -- -- 98 % -- -- -- -- --    11/28/24 0805 -- -- -- -- -- -- -- 97 % -- -- -- -- --    11/28/24 0801 98.8 °F (37.1 °C) 104 16 66/29 42 -- -- 98 % -- Ventilator Lying -- --    11/28/24 0800 -- -- -- -- -- -- -- -- -- -- -- 11 --    11/28/24 0700 98.8 °F (37.1 °C) 92 20 93/50 67 -- -- 100 % -- -- -- -- --    11/28/24 0636 99 °F (37.2 °C) 95 20 107/53 76 -- -- 100 % -- -- -- -- --    11/28/24 0605 98.8 °F (37.1 °C) 100 19 90/51 66 -- -- 100 % -- -- -- -- --    11/28/24 0430 99 °F (37.2 °C) 102 29 89/52 66 -- -- 98 % -- -- -- -- --    11/28/24 0400 99 °F (37.2 °C) 97 26 124/61 80 -- -- 98 % -- -- -- 11 --    11/28/24 0330 99.3 °F (37.4 °C) 96 25 111/59 71 -- -- 98 % -- -- -- -- --    11/28/24 0307 -- -- -- -- -- -- -- 98 % -- -- -- -- --    11/28/24 0305 95 °F (35 °C) 96 21 90/54 67 -- -- 100 % -- -- -- -- --    11/28/24 0200 99.5 °F (37.5 °C) 112 20 90/55 67 -- -- 97 % -- -- -- -- --    11/28/24 0130 99.5 °F (37.5 °C) 119 21 95/53 67 -- -- 97 % -- -- -- -- --    11/28/24 0100 99.3 °F (37.4 °C) 115 22 101/64 74 -- -- 98 % -- -- -- -- --    11/28/24 0030 99.1 °F (37.3 °C) 113 20 126/69 85 -- -- 98 % -- -- -- -- --    11/28/24 0000 99.1 °F (37.3 °C) 120 18 105/65 80 -- -- 97  % -- -- -- 11 --    11/27/24 2318 -- -- -- -- -- -- -- 99 % -- -- -- -- --    11/27/24 2300 99.1 °F (37.3 °C) 106 16 105/59 76 -- -- 98 % -- -- -- -- --    11/27/24 2200 99.1 °F (37.3 °C) 105 17 99/53 74 -- -- 98 % -- -- -- -- --    11/27/24 2100 99.1 °F (37.3 °C) 110 20 91/52 66 -- -- 97 % -- -- -- -- --    11/27/24 2035 99.3 °F (37.4 °C) 110 19 81/60 67 -- -- 93 % -- -- -- -- --    11/27/24 2004 99.5 °F (37.5 °C) 121 17 82/62 67 -- -- 96 % -- -- -- -- --    11/27/24 2000 99.3 °F (37.4 °C) 136 18 -- -- -- -- 96 % -- -- -- 11 --    11/27/24 1945 99.5 °F (37.5 °C) 126 18 103/54 77 -- -- 96 % -- -- -- -- --    11/27/24 1930 99.5 °F (37.5 °C) 140 22 99/74 80 -- -- 96 % -- -- -- -- --    11/27/24 1916 99.7 °F (37.6 °C) 138 18 113/74 86 -- -- 96 % -- -- -- -- --    11/27/24 1900 99.5 °F (37.5 °C) 116 25 128/58 83 -- -- 97 % -- -- -- -- --    11/27/24 1854 -- -- -- -- -- -- -- 97 % -- -- -- -- --    11/27/24 1800 99.1 °F (37.3 °C) 116 45 118/55 77 -- -- 97 % -- -- -- -- --    11/27/24 1700 99.1 °F (37.3 °C) 109 18 100/52 73 -- -- 97 % -- -- -- -- --    11/27/24 1630 -- -- -- -- -- -- -- -- -- -- -- 11 --    11/27/24 1600 99.1 °F (37.3 °C) 103 25 97/51 71 -- -- 97 % -- -- -- -- --    11/27/24 1500 99.1 °F (37.3 °C) 109 17 85/49 62 -- -- 97 % -- -- -- -- --    11/27/24 1400 99.3 °F (37.4 °C) 104 26 79/50 57 -- -- 96 % -- -- -- -- --    11/27/24 1347 -- -- -- -- -- -- -- 99 % -- -- -- -- --    11/27/24 1300 98.6 °F (37 °C) 102 23 90/59 65 -- -- 96 % -- -- -- -- --    11/27/24 1221 -- -- -- -- -- -- -- -- -- -- -- 11 --    11/27/24 1200 98.8 °F (37.1 °C) 102 23 88/57 68 -- -- 99 % -- Ventilator Lying -- --    11/27/24 1100 98.1 °F (36.7 °C) 102 24 71/53 58 -- -- 98 % -- -- -- -- --    11/27/24 1000 98.4 °F (36.9 °C) 114 13 84/54 65 -- -- 97 % -- -- -- -- --    11/27/24 0900 99.7 °F (37.6 °C) 106 24 90/53 68 -- -- 100 % -- -- -- -- --    11/27/24 0830 -- -- -- -- -- -- -- -- -- -- -- 11 --    11/27/24 0815 99 °F (37.2 °C) 104  28 89/51 66 -- -- 93 % -- -- -- -- --    11/27/24 0800 99.3 °F (37.4 °C) 103 28 92/51 68 -- -- 97 % -- -- -- -- --    11/27/24 0730 99.5 °F (37.5 °C) 102 25 92/50 67 -- -- 98 % -- -- -- -- --    11/27/24 0700 99.3 °F (37.4 °C) 104 25 89/50 64 -- -- 98 % -- -- -- -- --    11/27/24 0600 99.9 °F (37.7 °C) 104 33 106/54 78 -- -- 98 % -- -- -- -- --    11/27/24 0500 99.1 °F (37.3 °C) 109 19 103/51 73 -- -- 99 % -- -- -- -- --    11/27/24 0400 98.2 °F (36.8 °C) 104 22 91/55 68 -- -- 97 % -- -- -- 11 --    11/27/24 0300 99.1 °F (37.3 °C) 102 18 89/53 66 -- -- 99 % -- -- -- -- --    11/27/24 0215 99.5 °F (37.5 °C) 99 21 88/53 67 -- -- 99 % -- -- -- -- --    11/27/24 0200 99.7 °F (37.6 °C) 102 25 85/46 60 -- -- 100 % -- Ventilator Lying -- --    11/27/24 0100 99.3 °F (37.4 °C) 100 21 84/53 62 -- -- 100 % -- -- -- -- --    11/27/24 0000 99.3 °F (37.4 °C) 99 14 81/49 60 -- -- 100 % -- -- -- 11 --    11/26/24 2300 99.5 °F (37.5 °C) 98 20 84/45 60 -- -- 100 % -- -- -- -- --    11/26/24 2200 99.1 °F (37.3 °C) 121 17 91/51 63 -- -- 100 % -- -- -- -- --    11/26/24 2100 99.5 °F (37.5 °C) 119 26 108/65 76 -- -- 98 % -- -- -- -- --    11/26/24 2000 99.1 °F (37.3 °C) 120 21 117/75 91 -- -- 98 % -- -- -- 11 --    11/26/24 1958 -- -- -- -- -- -- -- -- 40 Ventilator -- -- --    11/26/24 1900 97.2 °F (36.2 °C) 129 53 122/72 90 -- -- 99 % -- -- -- -- --    11/26/24 1800 99.3 °F (37.4 °C) 110 15 112/55 79 -- -- 99 % -- -- -- -- --    11/26/24 1700 98.8 °F (37.1 °C) 112 20 114/61 82 -- -- 98 % -- -- -- -- --    11/26/24 1600 99 °F (37.2 °C) 107 19 107/55 78 -- -- 96 % -- -- -- 11 --    11/26/24 1550 -- -- -- -- -- -- -- 96 % -- -- -- -- --    11/26/24 1500 98.8 °F (37.1 °C) 113 15 100/57 73 -- -- 97 % -- -- -- -- --    11/26/24 1400 99 °F (37.2 °C) 108 18 99/55 73 -- -- 97 % -- -- -- -- --    11/26/24 1300 98.8 °F (37.1 °C) 104 23 89/52 66 -- -- 95 % -- -- -- -- --    11/26/24 1200 98.8 °F (37.1 °C) 101 17 98/60 75 -- -- 98 % -- -- -- 11 --     11/26/24 1100 98.4 °F (36.9 °C) 101 20 -- -- -- -- 90 % -- -- -- -- --    11/26/24 1032 -- -- -- -- -- -- -- 97 % -- -- -- -- --    11/26/24 1000 98.6 °F (37 °C) 102 15 108/57 79 -- -- 98 % -- -- -- -- --    11/26/24 0900 99 °F (37.2 °C) 113 15 114/79 93 -- -- 100 % -- -- -- -- --    11/26/24 0800 99 °F (37.2 °C) 115 16 99/64 77 -- -- 98 % -- -- -- 11 --    11/26/24 0700 99.1 °F (37.3 °C) 121 17 114/55 79 -- -- 96 % -- -- -- -- --    11/26/24 0600 99.5 °F (37.5 °C) 113 15 116/80 94 -- -- 99 % -- -- -- -- --    11/26/24 0500 99.3 °F (37.4 °C) 119 16 114/64 84 -- -- 97 % -- -- -- -- --    11/26/24 0400 99.1 °F (37.3 °C) 104 22 110/51 73 -- -- 99 % -- -- -- 11 --    11/26/24 0314 -- -- -- -- -- -- -- 98 % -- -- -- -- --    11/26/24 0300 98.8 °F (37.1 °C) 107 20 106/60 78 -- -- 98 % -- -- -- -- --    11/26/24 0200 98.8 °F (37.1 °C) 107 20 106/52 75 -- -- 98 % -- -- -- -- --    11/26/24 0100 98.8 °F (37.1 °C) 102 18 99/54 73 -- -- 99 % -- -- -- -- --    11/26/24 0000 98.6 °F (37 °C) 104 20 100/55 75 -- -- 92 % -- -- -- 11 --          Weight (last 2 days)       Date/Time Weight    11/29/24 0444 122 (268.52)    11/28/24 0600 126 (278.22)    11/27/24 0600 129 (283.95)            Pertinent Labs/Diagnostic Results:   Radiology:  CT abdomen pelvis w contrast   Final Interpretation by Dariel Mosley MD (11/28 1036)      Suspicion of a small amount of hemorrhage within the left posterior paraspinal musculature at L4-L5 as described above and new since 12/11/2022. Otherwise no definite evidence for retroperitoneal hemorrhage or rectus abdominal sheath hemorrhage as    clinically questioned.      Large ventral abdominal wall hernia containing nonobstructed bowel.      Small left pleural effusion. Left lower lobe atelectasis is noted.      The study was marked in EPIC for immediate notification.         Workstation performed: [id number]         XR chest portable ICU   Final Interpretation by Samuel Olsen MD (11/27 9106)       Obscured left diaphragm shadow suggest the presence of left basilar atelectasis or infiltrate. There may also be left pleural fluid. Lateral view might be helpful if it can be obtained.      Satisfactory tube positioning            Workstation performed: [id number]         VAS upper limb venous duplex scan, unilateral/limited   Final Interpretation by Daniele Nunez MD (11/25 1304)      XR chest portable ICU   Final Interpretation by Facundo Gomez MD (11/25 0435)   Airspace disease at both lung bases compatible with bilateral pneumonia.            Workstation performed: [id number]         MRI brain seizure wo and w contrast   Final Interpretation by Sam Cervantes MD (11/21 0140)      No evidence of acute infarct, intracranial hemorrhage or mass.         Workstation performed: [id number]         FL IN-patient lumbar puncture   Final Interpretation by Sebastian Krishna MD (11/19 4997)      -Unsuccessful fluoroscopically guided lumbar puncture. Severely limited visualization of the spine in AP plane.      Procedure was performed by Dr. Sebastian Krishna and Jodie WEBSTER.         Workstation performed: [id number]         CT head wo contrast   Final Interpretation by Collins Fernandez MD (11/19 6574)         1. No acute intracranial hemorrhage, mass effect or edema.   2. Mild, chronic microangiopathy.                  Workstation performed: [id number]         XR chest portable   Final Interpretation by Symone Fonseca MD (11/17 0482)      ET tube 3 cm above the kailey.      Left greater than right bibasilar opacity which could be due to atelectasis and/or pneumonia in the appropriate clinical setting.            Workstation performed: [id number]         XR chest portable   Final Interpretation by Lanre Grant MD (11/14 9003)      Pulmonary vascular congestion and/or mild diffuse prominence of interstitial markings.            Workstation performed: [id number]         XR chest portable    Final Interpretation by Symone Fonseca MD (11/13 0603)      Marked improvement of the left lung post bronchoscopy with no pneumothorax.            Workstation performed: [id number]         X-ray chest 1 view   Final Interpretation by Symone Fonseca MD (11/13 0601)      Right jugular catheter in upper SVC with no pneumothorax.      ET tube 2.5 cm above the kailey.      Worsening opacification of the left hemithorax with complete loss of aeration of the left lung. Abrupt cutoff of the left main bronchus suggests mucous plugging and atelectasis. Pneumonia not excluded in the appropriate clinical setting.            Workstation performed: [id number]           Cardiology:  ECG 12 lead   Final Result by Gilbert Andrew MD (11/25 1630)   Atrial fibrillation with rapid ventricular response with premature    ventricular or aberrantly conducted complexes   Low voltage QRS   Possible Anterolateral infarct (cited on or before 16-Nov-2022)   Abnormal ECG   When compared with ECG of 17-Nov-2024 07:50,   Questionable change in QRS duration   Questionable change in initial forces of Anteroseptal leads   Confirmed by Gilbert Andrew ([id number]) on 11/25/2024 4:30:10 PM      ECG 12 lead   Final Result by Robles Haile MD (11/18 2142)   Atrial fibrillation with rapid ventricular response   Low voltage QRS   Possible Anterolateral infarct (cited on or before 16-Nov-2022)   Abnormal ECG   When compared with ECG of 14-Nov-2024 15:40, (unconfirmed)   Nonspecific ST-t wave changes No longer present   Confirmed by Robles Haile ([id number]) on 11/18/2024 9:42:03 PM      Echo follow up/limited w/ contrast if indicated   Final Result by Radha Baxter MD (11/15 0828)        Left Ventricle: Left ventricular cavity size is normal. Wall thickness    is moderately increased. There is moderate concentric hypertrophy. The    left ventricular ejection fraction is 60%. Systolic function is normal.    Wall motion cannot be accurately assessed.          ECG 12 lead   Final Result by King Washington MD (11/17 2314)   Atrial fibrillation with rapid ventricular response   Left axis deviation   Low voltage QRS   Poor anterior R wave progression is noted   Marked ST abnormality, possible inferior subendocardial injury   Abnormal ECG   When compared with ECG of 14-Nov-2024 00:00, (unconfirmed)   ST depression has replaced ST elevation in Inferior leads   Confirmed by King Washington ([id number]) on 11/17/2024 11:14:34 PM      ECG 12 lead   Final Result by King Washington MD (11/17 2313)   Atrial fibrillation with rapid ventricular response   Low voltage QRS   Poor anterior R wave progression is noted   ST elevation, consider early repolarization, pericarditis, or injury   Inferior injury pattern   Abnormal ECG   When compared with ECG of 13-Nov-2024 23:52, (unconfirmed)   ST elevation now present in Inferior leads   Confirmed by King Washington ([id number]) on 11/17/2024 11:13:22 PM      ECG 12 lead   Final Result by King Washington MD (11/17 2312)   Atrial fibrillation with rapid ventricular response with premature    ventricular or aberrantly conducted complexes   Left axis deviation   Low voltage QRS   Poor anterior R wave progression is noted   Abnormal ECG   When compared with ECG of 13-Nov-2024 23:44, (unconfirmed)   No significant change was found   Confirmed by King Washington ([id number]) on 11/17/2024 11:12:21 PM      Echo follow up/limited w/ contrast if indicated   Final Result by Himanshu Gordon MD (11/14 1000)   This is a limited and technically difficult study performed urgently to    exclude pericardial effusion.   No pericardial effusion was noted.         ECG 12 lead    by Interface, Ris Results In (11/13 2346)      ECG 12 lead    by Interface, Ris Results In (11/13 2258)      ECG 12 lead    by Interface, Ris Results In (11/13 2253)      ECG 12 lead    by Interface, Ris Results In (11/13 1004)      ECG 12 lead   Final Result by King Washington  MD (11/17 7306)   Atrial fibrillation   Indeterminate axis   Possible Right ventricular hypertrophy   Poor anterior R wave progression is noted   Prolonged QT   Abnormal ECG   When compared with ECG of 12-Nov-2024 13:05, (unconfirmed)   Incomplete right bundle branch block is no longer Present   Questionable change in initial forces of Anteroseptal leads   Confirmed by King Washington ([id number]) on 11/17/2024 11:12:12 PM      Echo complete w/ contrast if indicated   Final Result by Marcos Strak MD (11/13 2182)        Left Ventricle: Left ventricle is not well visualized. Wall thickness    is mild to moderately increased. There is mild to moderate concentric    hypertrophy. Unable to assess LV EF. Wall motion cannot be accurately    assessed.     Right Ventricle: Right ventricle is not well visualized.     Right Atrium: The atrium is dilated.     Tricuspid Valve: There is at least moderate regurgitation.     Prior TTE study available for comparison. Prior study date: 11/16/2022.    Changes noted when compared to prior study. Changes include: Unable to    compare due to poor quality images for below mentioned reasons in the    past. .      Technically difficult study.    Poor image quality due to poor acoustic windows and lung interference.            GI:  Bronchoscopy   Final Result by Barby Andrew MD (11/20 8320)   The trachea and main kailey appeared normal.   Severe edematous, erythematous mucosa in the left lung and right lung,    right lower > left lower   Minimal, thin and clear secretions present in the trachea, main kailey,    left lung and right lung         RECOMMENDATION:   - Rest of care per MICU note dated 11/20          Gregorio Crain DO   Pulmonary & Critical Care, PGY V      Attending attestation   I was present for the entire procedure      Barby Andrew MD   Pulmonary and Critical Care Medicine          Bronchoscopy   Final Result by Reba Velez DO (11/13 1812)               RECOMMENDATION:    Continue mechanical ventilation, wean as able.   Airway clearance with nebulizer and frequent suctioning, consider vest    therapy.   HOB >30 degrees.   Follow-up washing cultures.   Abx as indicated.      Dustin Lugo DO   PGY-5, Pulmonary/Critical Care   [facility]                 Results from last 7 days   Lab Units 11/29/24 0453 11/28/24 2013 11/28/24  1218 11/28/24  0814 11/28/24  0500 11/27/24  1116 11/27/24  0503 11/26/24  0454 11/25/24  0733   WBC Thousand/uL 7.17  --   --   --  7.10  --  7.05   < > 7.40   HEMOGLOBIN g/dL 7.8* 8.0* 8.2* 6.7* 6.8*   < > 7.0*   < > 7.9*   HEMATOCRIT % 26.1* 26.5* 27.0* 23.0* 23.0*   < > 23.7*   < > 26.3*   PLATELETS Thousands/uL 334  --   --   --  258  --  257   < > 256   BANDS PCT %  --   --   --   --  1  --   --   --  1    < > = values in this interval not displayed.         Results from last 7 days   Lab Units 11/29/24 0453 11/28/24  0500 11/27/24  0503 11/26/24 0454 11/25/24  0502 11/25/24  0427   SODIUM mmol/L 144 143 144 143  --  138   POTASSIUM mmol/L 4.1 3.9 4.1 4.2  --  4.2   CHLORIDE mmol/L 103 101 104 102  --  98   CO2 mmol/L 32 33* 32 34*  --  31   ANION GAP mmol/L 9 9 8 7  --  9   BUN mg/dL 26* 26* 23 23  --  24   CREATININE mg/dL 1.28 1.42* 1.32* 1.32*  --  1.35*   EGFR ml/min/1.73sq m 43 38 42 42  --  41   CALCIUM mg/dL 9.1 9.1 8.8 9.3  --  8.3*   MAGNESIUM mg/dL 2.2 1.8* 1.9 2.1 1.8*  --    PHOSPHORUS mg/dL 4.4* 3.4 3.1 3.5  --  3.1         Results from last 7 days   Lab Units 11/29/24  1145 11/29/24  0552 11/28/24  2318 11/28/24  1727 11/28/24  1136 11/28/24  0056 11/27/24  1654 11/27/24  1133 11/27/24  0615 11/26/24  2323 11/26/24  1703 11/26/24  1145   POC GLUCOSE mg/dl 85 95 96 95 111 101 98 109 95 99 101 112     Results from last 7 days   Lab Units 11/29/24  0453 11/28/24  0500 11/27/24  0503 11/26/24  0454 11/25/24  0427 11/24/24  0541 11/23/24  0501   GLUCOSE RANDOM mg/dL 106 124 114 108 199* 113 126             No results found for:  "\"BETA-HYDROXYBUTYRATE\"   Results from last 7 days   Lab Units 11/29/24  1051 11/25/24  0511   PH ART  7.393 7.409   PCO2 ART mm Hg 51.3* 53.5*   PO2 ART mm Hg 115.8 92.6   HCO3 ART mmol/L 30.6* 33.1*   BASE EXC ART mmol/L 4.9 7.5   O2 CONTENT ART mL/dL 12.5* 10.4*   O2 HGB, ARTERIAL % 96.7 95.3   ABG SOURCE  Line, Arterial Radial, Right                                     Results from last 7 days   Lab Units 11/28/24  2141   LACTIC ACID mmol/L 0.8                         Results from last 7 days   Lab Units 11/29/24  0555   UNIT PRODUCT CODE  [id number]   UNIT NUMBER  [id number]   UNITABO  O   UNITRH  NEG   CROSSMATCH  Compatible   UNIT DISPENSE STATUS  Presumed Trans   UNIT PRODUCT VOL ml 350                         Results from last 7 days   Lab Units 11/23/24  2021   CLARITY UA  Clear  Clear   COLOR UA  Light Yellow  Light Yellow   SPEC GRAV UA  1.019  1.019   PH UA  6.0  5.5   GLUCOSE UA mg/dl Negative  Negative   KETONES UA mg/dl Negative  Negative   BLOOD UA  Small*  Small*   PROTEIN UA mg/dl 50 (1+)*  50 (1+)*   NITRITE UA  Negative  Negative   BILIRUBIN UA  Negative  Negative   UROBILINOGEN UA (BE) mg/dl <2.0  <2.0   LEUKOCYTES UA  Negative  Negative   WBC UA /hpf 2-4*  1-2   RBC UA /hpf 10-20*  10-20*   BACTERIA UA /hpf Occasional  Occasional   EPITHELIAL CELLS WET PREP /hpf Occasional  Occasional   MUCUS THREADS  Occasional*  Occasional*                                 Results from last 7 days   Lab Units 11/24/24  0032   BLOOD CULTURE  No Growth After 5 Days.  Staphylococcus epidermidis*   GRAM STAIN RESULT  Gram positive cocci in clusters*                   Network Utilization Review Department  ATTENTION: Please call with any questions or concerns to [phone] and carefully listen to the prompts so that you are directed to the right person. All voicemails are confidential.   For Discharge needs, contact Care Management DC Support Team at [phone] opt. 2  Send all requests for " admission clinical reviews, approved or denied determinations and any other requests to dedicated fax number below belonging to the campus where the patient is receiving treatment. List of dedicated fax numbers for the Facilities:  FACILITY NAME UR FAX NUMBER   ADMISSION DENIALS (Administrative/Medical Necessity) [phone]   DISCHARGE SUPPORT TEAM (NETWORK) [phone]   PARENT CHILD HEALTH (Maternity/NICU/Pediatrics) [phone]   [facility] [phone]   [facility] [phone]   [facility] [phone]   General acute hospital [phone]   [facility] [phone]   General acute hospital [phone]   [facility] [phone]   [facility] [phone]   [facility] [phone]   [facility] [phone]   [facility] [phone]   [facility] [phone]

## 2024-11-29 NOTE — PROGRESS NOTES
Progress Note - Infectious Disease   Name: Adelina Soto 64 y.o. female I MRN: [id number]  Unit/Bed#: MICU 01 I Date of Admission: 11/12/2024   Date of Service: 11/29/2024 I Hospital Day: 17    Assessment & Plan  Seizure (HCC)   Patient presented with acute encephalopathy and seizure-like activity.  Video EEG concerning for status epilepticus.  MRI brain without contrast was limited by motion but showed restricted diffusion in the left temporal lobe which could be postictal versus infection.  Status post LP 11/12 and CSF studies unremarkable, not consistent with infection with 2 WBCs.  Protein mildly elevated which could be due to seizure activity itself.  ME panel negative, stand alone HSV PCR also negative.  However with new onset seizures and possible abnormality of the temporal lobe HSV remains a possibility as PCR may be negative and CSF bland and early disease.  CSF culture with no growth.  The patient has grown E. coli and ESBL Proteus in urine culture but in the absence of prior systemic signs of infection or localizing symptoms UTI, doubt this is causing status epilepticus. HIV negative. Fever and leukocytosis improved.  Mental status remains poor.  IR unable to perform repeat LP due to technical difficulty.  MRI brain without evidence of acute infarct, intracranial hemorrhage or mass.  Mental status improving, she is now following commands.  Patient completed 14 days of intravenous acyclovir and is now off all antivirals              -No additional IV acyclovir for now  -Antiepileptic drugs as per neurology  -Monitor cognition  Bacteriuria  UA with pyuria and urine culture growing E. coli and ESBL Proteus.  No symptoms of infection prior to admission.  Unlikely cause of status epilepticus however in absence of clear cause of seizure activity treated for possible cystitis with 3 days zosyn   -monitor off antibiotics   Shock (HCC)  May be multifactorial related to possible infection, sedating  medications.  Off vasopressor support.  Now with recurrent shock that I suspect is secondary to GI bleeding with notable severe anemia.   -Hemodynamic support per critical care team   -Transfusion support  Acute hypoxic respiratory failure (HCC)  Patient intubated for airway protection.  Status post bronchoscopy 11/13 for mucous plugging and thick left-sided secretions seen.  Chest x-ray not consistent with pneumonia.  BAL culture shows growth of mixed respiratory viktoriya.  Chest x-ray with bilateral airspace opacities but no clinical evidence of pneumonia, patient has been on stable ventilator settings without significant secretions.  No clinical evidence of pneumonia at this time.  Repeat chest x-ray with some left basilar consolidation consistent with atelectasis and possible effusion.   -Ventilator management per critical care team   -No additional antibiotics indicated.  Bacteremia due to Staphylococcus  1 of 2 blood cultures collected 11/24 are growing staph epidermidis. Likely contaminant. Monitor off additional antibiotics   Morbid obesity with BMI of 50.0-59.9, adult ([facility])  Affects antimicrobial dosing  CKD (chronic kidney disease)  Lab Results   Component Value Date    EGFR 43 11/29/2024    EGFR 38 11/28/2024    EGFR 42 11/27/2024    CREATININE 1.28 11/29/2024    CREATININE 1.42 (H) 11/28/2024    CREATININE 1.32 (H) 11/27/2024   Baseline creatinine 1.7-1.9.  Creatinine stable, slightly below baseline.  Dose adjust antibiotics as needed for creatinine clearance.  Monitor creatinine daily  Type 2 diabetes mellitus with hyperglycemia, unspecified whether long term insulin use ([facility])  Lab Results   Component Value Date    HGBA1C 5.7 (H) 11/13/2024   Diabetes well-controlled.  Glycemic management per primary team    I have discussed with the critical care service the above plan to monitor off all antibiotics.  Critical care service agrees with the plan.    Infectious disease service will see the patient again  12/2/2024.  Please call if questions.    Antibiotics:  Off IV acyclovir 3    Subjective   Patient continues to require ventilatory support.  Not requiring any vasopressor support.  No reported diarrhea or vomiting.  Not having any fever.    Objective :  Temp:  [97.2 °F (36.2 °C)-99.1 °F (37.3 °C)] 97.2 °F (36.2 °C)  HR:  [] 84  BP: (111-134)/(49-59) 131/59  Resp:  [14-27] 18  SpO2:  [90 %-100 %] 100 %  O2 Device: Ventilator  FiO2 (%):  [40] 40    General: Resting on the ventilator no acute distress  Psychiatric: Opens eyes to command and tracks  Pulmonary:  Normal respiratory excursion without accessory muscle use  Abdomen:  Soft, nontender  Extremities:  No edema  Skin:  No rashes      Lab Results: I have reviewed the following results:  Results from last 7 days   Lab Units 11/29/24 0453 11/28/24 2013 11/28/24  1218 11/28/24  0814 11/28/24  0500 11/27/24  1116 11/27/24  0503   WBC Thousand/uL 7.17  --   --   --  7.10  --  7.05   HEMOGLOBIN g/dL 7.8* 8.0* 8.2*   < > 6.8*   < > 7.0*   PLATELETS Thousands/uL 334  --   --   --  258  --  257    < > = values in this interval not displayed.     Results from last 7 days   Lab Units 11/29/24 0453 11/28/24  0500 11/27/24  0503   SODIUM mmol/L 144 143 144   POTASSIUM mmol/L 4.1 3.9 4.1   CHLORIDE mmol/L 103 101 104   CO2 mmol/L 32 33* 32   BUN mg/dL 26* 26* 23   CREATININE mg/dL 1.28 1.42* 1.32*   EGFR ml/min/1.73sq m 43 38 42   CALCIUM mg/dL 9.1 9.1 8.8     Results from last 7 days   Lab Units 11/24/24  0032   BLOOD CULTURE  No Growth After 4 Days.  Staphylococcus epidermidis*   GRAM STAIN RESULT  Gram positive cocci in clusters*                     Imaging Results Review: I personally reviewed the following image studies in PACS and associated radiology reports: CT abdomen/pelvis. My interpretation of the radiology images/reports is: No acute intra-abdominal or pelvic process.

## 2024-11-29 NOTE — RESPIRATORY THERAPY NOTE
11/28/24 3527   Respiratory Assessment   Resp Comments Pinsp increased to maintain Vt >6cc/kg.   Vent Information   Vent type Kathleen G5   Kathleen Vent Mode P-CMV/PCV+   $ Pulse Oximetry Spot Check Charge Completed   P-CMV/PCV+ Settings   Resp Rate (BPM) 14 BPM   Pressure Control/Pinsp (cmH20) 14 cmH20   Insp Time (S) 0.8 sec   FiO2 (%) 40 %   PEEP (cmH2O) 6 cmH2O   P-ramp (ms) 50 (ms)   Flow Trigger (LPM) 5 LPM   Humidification Heater   Heater Temp 98.6 °F (37 °C)   P-CMV/PCV+ Actuals   Resp Rate (BPM) 19 BPM   VT (mL) 301 mL   MV 6.3   MAP (cmH2O) 11 cmH2O   Peak Pressure (cmH2O) 21 cmH2O   I:E Ratio (Obs) 1:2.1   P-CMV/PCV+ Alarms    High Peak Pressure (cmH2O) 40 cmH2O   Low Pressure (cmH2O) 5 cm H2O   High Resp Rate (BPM) 40 BPM   Low Resp Rate (BPM) 8 BPM   High MV (L/min) 20 L/min   Low MV (L/min) 4 L/min   High Spont VTE (mL) 1000 mL   Low Spont VTE (mL) 250 mL   Maintenance   Water bag changed No   Circuit changed No

## 2024-11-29 NOTE — PROGRESS NOTES
Progress Note - Critical Care/ICU   Name: Adelina Soto 64 y.o. female I MRN: [id number]  [facility] Date of Admission: 11/12/2024   Date of Service: 11/29/2024 I Hospital Day: 17       Summary:  Patient is a 64 year old female with history of Afib on Warfarin, HTN, T2DM, morbid obesity, HLD, hypothyroidism, initially presented to [facility] on 11/12 after being found by her partner at home with AMS, seizure-like activity. EMS witnessed tonic-clonic seizure-like activity, treated with 6 mg of Versed. 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 Was subsequently intubated for airway protection and transferred to [facility]. Initial LP generally unremarkable apart from RBC's 300's from otherwise atraumatic tap, negative ME panel and HSV. 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     [facility], during evaluation pressors were weaned and AEDs adjusted. Underwent CT head which appeared unchanged, LP on 11/19 with fluoro unsuccessful. Off video EEG 11/20, repeat MRI showed no acute findings. Minimal improvements in neurological exam thus far, does open eyes to voice and blink on command. Ongoing discussion regarding goals of care, tracheostomy/feeding tube in LTAC versus comfort care, POA thus far remains treatment oriented. Had family meeting 11/25, declined tracheostomy and also would not wish to have her remain in an LTAC / nursing home long term. Additionally discussed 11/26 that she would not like to be resuscitated in the event of a cardiac arrest, code status updated to Level 2 DNR/DNI.     Overall very gradual improvements in neurological exam day-to-day, able to breath spontaneously on the ventilator and follow basic commands.  Much more alert today and able to raise her head off the bed, doing well on spontaneous 8/6 with occasional lower volumes. Will consider extubating to BiPAP today.    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, started on acyclovir  Neurology following, vEEG initially showing left frontal seizures, burst suppressed initially, now on Keppra 1g q12h, Phenytoin 100 mg q8h, Depacon 375 mg q6h  LP showed glucose 89, protein 74, 339 RBC's, 2 WBC's, ME panel negative  ID following, completed acyclovir 10 mg/kg every 12 hours through 11/26.  Off vEEG 11/20  Repeat MRI 11/20 unremarkable  More agitation, getting PRN Fentanyl doses; otherwise exam continues to very gradually improve day-to-day     Diagnosis: H/o depression  Plan:  Continue home Zoloft     CV:   Diagnosis: Shock/hypotension, likely in setting of sedation - resolved  Plan:  MAP 65-70, off pressors      Diagnosis: H/o Afib on Warfarin  Plan:  Lopressor 50 mg PO TID; has been meeting hold parameters frequently due to hypotension, however rate relatively controlled < 110bpm  AC with Lovenox 1 mg/kg BID  Consider amiodarone for rate control if persistently > 110 bpm     Pulm:  Diagnosis: Acute hypoxic respiratory failure  Intubated on arrival for airway protection due to AMS  Vent settings: PCMV, 14/12/6/40%  Bronch 11/12 with generalized edematous friable mucosa and moderate thick purulent secretions, culture with mixed respiratory viktoriya  Initial chest x-ray on arrival with mild edema more so on the right, left-sided whiteout likely from mucous plugging and atelectasis, given improvement after bronchoscopy, now continues to have crackles bilaterally  Repeat bronchoscopy 11/20 generally unremarkable  SAT/SBT daily  Consider extubating today to BiPAP    GI:   Diagnosis: GERD  Plan:  Continue home pepcid    Diagnosis: Rectal bleeding  Noted to have some  rectal bleeding with a blood clot near rectal tube site 11/26  Patient does reportedly have history of internal hemorrhoids, suspect this was likely related to trauma from rectal tube insertion or manipulation  Removed, continue to monitor for any persistent clots/bleeding     :   Diagnosis: CKD  Baseline Cr 1.4-1.8  Remains baseline throughout hospital stay  Monitor renal fx, monitor I/O's, avoid nephrotoxins     F/E/N:   F - none  E - K > 4, Mg > 2, Phos > 3  N - TF with Vital 1.2 @ 55/hr     Heme/Onc:   Diagnosis: Anemia  Hgb 9-10 this admission  Transfused 1u pRBC's 11/28 due to drop to 6.7, found to have small hemmorhage in the left paraspinal muscle     Endo:   Diagnosis: Hypothyroidism  TSH 0.29, T4 1.23  Continue home levothyroxine     Diagnosis: T2DM  Diet controlled, not on medications outpatient  Monitor BG for goal 140-180  Add SSI if needed     ID:   Diagnosis: HSV encephalitis  Initial MRI brain w/ c/f HSV encephalitis based on left temporal enhancement  LP and ME panel neg, unfortunately unable to obtain second CSF analyses for confirmation  ID following, Acyclovir 10 mg/kg q12h through 11/26  Repeat infectious w/u if repeatedly elevated temp     Diagnosis: UTI, possible pneumonia  Urine culture growing E. coli and ESBL proteus  Earlier bronchoscopy with copious purulent secretions, however culture with mixed respiratory viktoriya  Not treated earlier in admission given lack of fever, improvement in leukocytosis     MSK/Skin:   Diagnosis: Pressure wounds  CTA imaging 11/29 notable for small bleed in the left posterior paraspinal musculature ~L4-L5; no retroperitoneal hemmorhage.  Transfused 1u pRBC's with appropriate response  Monitor Hgb    Diagnosis: Pressure wounds  Continue wound care     Disposition: Critical care    ICU Core Measures     Vented Patient  VAP Bundle  VAP bundle ordered     A: Assess, Prevent, and Manage Pain Has pain been assessed? Yes  Need for changes to pain regimen? No   B: Both  Spontaneous Awakening Trials (SATs) and Spontaneous Breathing Trials (SBTs) Plan to perform spontaneous awakening trial today? Yes   Plan to perform spontaneous breathing trial today? Yes   Obvious barriers to extubation? No   C: Choice of Sedation RASS Goal: 0 Alert and Calm  Need for changes to sedation or analgesia regimen? No   D: Delirium CAM-ICU: Unable to perform secondary to Acute cognitive dysfunction   E: Early Mobility  Plan for early mobility? NA   F: Family Engagement Plan for family engagement today? Yes       Review of Invasive Devices:    Stringer Plan: Continue secondary to wounds that would be worsened by incontinence        Prophylaxis:  VTE VTE covered by:  [Held by provider] enoxaparin, Subcutaneous, 120 mg at 11/28/24 0910       Stress Ulcer  covered byfamotidine (PEPCID) 20 mg tablet [[id number]] (Long-Term Med), famotidine (PEPCID) tablet 20 mg [[id number]], pantoprazole (PROTONIX) 40 mg tablet [[id number]] (Long-Term Med)         24 Hour Events : Overnight occasionally agitated, improved with dose of 25 mcg Fentanyl. Pulling lower volumes on spontaneous than previously, improved with some pressure support. Blood pressures remain soft but stable, MAP occasionally mid 50's to 60's.     Subjective   Review of Systems: Review of Systems not obtainable due to Clinical Condition    Objective :                   Vitals I/O      Most Recent Min/Max in 24hrs   Temp (!) 97.2 °F (36.2 °C) Temp  Min: 97.2 °F (36.2 °C)  Max: 99.1 °F (37.3 °C)   Pulse 84 Pulse  Min: 82  Max: 105   Resp 18 Resp  Min: 14  Max: 27   /59 BP  Min: 111/49  Max: 134/59   O2 Sat 100 % SpO2  Min: 90 %  Max: 100 %      Intake/Output Summary (Last 24 hours) at 11/29/2024 0837  Last data filed at 11/29/2024 0638  Gross per 24 hour   Intake 1448.91 ml   Output 1925 ml   Net -476.09 ml       Diet NPO    Invasive Monitoring           Physical Exam   Physical Exam  Vitals and nursing note reviewed.   Eyes:      Comments: Pupils were  equal, sluggish but responsive to light   HENT:      Head: Normocephalic and atraumatic.   Cardiovascular:      Rate and Rhythm: Regular rhythm. Tachycardia present.   Musculoskeletal:      Right lower le+ Edema present.      Left lower le+ Edema present.   Abdominal:      Palpations: Abdomen is soft.      Tenderness: There is no abdominal tenderness.   Constitutional:       Appearance: She is ill-appearing.      Interventions: She is intubated and restrained. She is not sedated.  Pulmonary:      Effort: Pulmonary effort is normal. She is intubated.   Neurological:      Comments: Opens eyes to voice, able to nod/shake head in response to questions, some movement of all 4 extremities, able to lift head off bed better than day prior.   Genitourinary/Anorectal:  Stringer present.        Diagnostic Studies        Lab Results: I have reviewed the following results:     Medications:  Scheduled PRN   atorvastatin, 10 mg, Daily With Dinner  chlorhexidine, 15 mL, Q12H LINO  [Held by provider] enoxaparin, 1 mg/kg, Q12H LINO  famotidine, 20 mg, Daily  levalbuterol, 1.25 mg, TID  levETIRAcetam, 750 mg, Q12H LINO  levothyroxine, 100 mcg, Early Morning  metoprolol tartrate, 50 mg, Q8H  EARLINE ANTIFUNGAL, , BID  sertraline, 50 mg, Daily  sodium chloride, 4 mL, TID  thiamine, 100 mg, Daily  valproate sodium, 500 mg, Q6H LINO      acetaminophen, 650 mg, Q4H PRN  albuterol, 2.5 mg, Q6H PRN  fentaNYL, 25 mcg, Q1H PRN  LORazepam, 2 mg, Q4H PRN  ondansetron, 4 mg, Q4H PRN  oxyCODONE, 5 mg, Q6H PRN  oxyCODONE, 2.5 mg, Q6H PRN       Continuous    norepinephrine, 1-30 mcg/min, Last Rate: 6 mcg/min (24 0426)         Labs:   CBC    Recent Labs     24  0500 24  0814 24  0453   WBC 7.10  --   --  7.17   HGB 6.8*   < > 8.0* 7.8*   HCT 23.0*   < > 26.5* 26.1*     --   --  334   BANDSPCT 1  --   --   --     < > = values in this interval not displayed.     BMP    Recent Labs     24  1520  11/29/24  0453   SODIUM 143 144   K 3.9 4.1    103   CO2 33* 32   AGAP 9 9   BUN 26* 26*   CREATININE 1.42* 1.28   CALCIUM 9.1 9.1       Coags    No recent results     Additional Electrolytes  Recent Labs     11/28/24  0500 11/29/24  0453   MG 1.8* 2.2   PHOS 3.4 4.4*          Blood Gas    No recent results    No recent results   LFTs  No recent results    Infectious  No recent results  Glucose  Recent Labs     11/28/24  0500 11/29/24  0453   GLUC 124 106

## 2024-11-29 NOTE — ASSESSMENT & PLAN NOTE
Lab Results   Component Value Date    EGFR 43 11/29/2024    EGFR 38 11/28/2024    EGFR 42 11/27/2024    CREATININE 1.28 11/29/2024    CREATININE 1.42 (H) 11/28/2024    CREATININE 1.32 (H) 11/27/2024   Baseline creatinine 1.7-1.9.  Creatinine stable, slightly below baseline.  Dose adjust antibiotics as needed for creatinine clearance.  Monitor creatinine daily

## 2024-11-29 NOTE — PLAN OF CARE
Problem: PAIN - ADULT  Goal: Verbalizes/displays adequate comfort level or baseline comfort level  Description: Interventions:  - Encourage patient to monitor pain and request assistance  - Assess pain using appropriate pain scale  - Administer analgesics based on type and severity of pain and evaluate response  - Implement non-pharmacological measures as appropriate and evaluate response  - Consider cultural and social influences on pain and pain management  - Notify physician/advanced practitioner if interventions unsuccessful or patient reports new pain  Outcome: Progressing     Problem: INFECTION - ADULT  Goal: Absence or prevention of progression during hospitalization  Description: INTERVENTIONS:  - Assess and monitor for signs and symptoms of infection  - Monitor lab/diagnostic results  - Monitor all insertion sites, i.e. indwelling lines, tubes, and drains  - Monitor endotracheal if appropriate and nasal secretions for changes in amount and color  - [place] appropriate cooling/warming therapies per order  - Administer medications as ordered  - Instruct and encourage patient and family to use good hand hygiene technique  - Identify and instruct in appropriate isolation precautions for identified infection/condition  Outcome: Progressing  Goal: Absence of fever/infection during neutropenic period  Description: INTERVENTIONS:  - Monitor WBC    Outcome: Progressing     Problem: SAFETY ADULT  Goal: Patient will remain free of falls  Description: INTERVENTIONS:  - Educate patient/family on patient safety including physical limitations  - Instruct patient to call for assistance with activity   - Consult OT/PT to assist with strengthening/mobility   - Keep Call bell within reach  - Keep bed low and locked with side rails adjusted as appropriate  - Keep care items and personal belongings within reach  - Initiate and maintain comfort rounds  - Make Fall Risk Sign visible to staff  - Apply yellow socks and bracelet  for high fall risk patients  - Consider moving patient to room near nurses station  Outcome: Progressing  Goal: Maintain or return to baseline ADL function  Description: INTERVENTIONS:  -  Assess patient's ability to carry out ADLs; assess patient's baseline for ADL function and identify physical deficits which impact ability to perform ADLs (bathing, care of mouth/teeth, toileting, grooming, dressing, etc.)  - Assess/evaluate cause of self-care deficits   - Assess range of motion  - Assess patient's mobility; develop plan if impaired  - Assess patient's need for assistive devices and provide as appropriate  - Encourage maximum independence but intervene and supervise when necessary  - Involve family in performance of ADLs  - Assess for home care needs following discharge   - Consider OT consult to assist with ADL evaluation and planning for discharge  - Provide patient education as appropriate  Outcome: Progressing  Goal: Maintains/Returns to pre admission functional level  Description: INTERVENTIONS:  - Perform AM-PAC 6 Click Basic Mobility/ Daily Activity assessment daily.  - Set and communicate daily mobility goal to care team and patient/family/caregiver.   - Collaborate with rehabilitation services on mobility goals if consulted  - Record patient progress and toleration of activity level   Outcome: Progressing     Problem: DISCHARGE PLANNING  Goal: Discharge to home or other facility with appropriate resources  Description: INTERVENTIONS:  - Identify barriers to discharge w/patient and caregiver  - Arrange for needed discharge resources and transportation as appropriate  - Identify discharge learning needs (meds, wound care, etc.)  - Arrange for interpretive services to assist at discharge as needed  - Refer to Case Management Department for coordinating discharge planning if the patient needs post-hospital services based on physician/advanced practitioner order or complex needs related to functional status,  cognitive ability, or social support system  Outcome: Progressing     Problem: Knowledge Deficit  Goal: Patient/family/caregiver demonstrates understanding of disease process, treatment plan, medications, and discharge instructions  Description: Complete learning assessment and assess knowledge base.  Interventions:  - Provide teaching at level of understanding  - Provide teaching via preferred learning methods  Outcome: Progressing     Problem: NEUROSENSORY - ADULT  Goal: Achieves stable or improved neurological status  Description: INTERVENTIONS  - Monitor and report changes in neurological status  - Monitor vital signs such as temperature, blood pressure, glucose, and any other labs ordered   - Initiate measures to prevent increased intracranial pressure  - Monitor for seizure activity and implement precautions if appropriate      Outcome: Progressing  Goal: Remains free of injury related to seizures activity  Description: INTERVENTIONS  - Maintain airway, patient safety  and administer oxygen as ordered  - Monitor patient for seizure activity, document and report duration and description of seizure to physician/advanced practitioner  - If seizure occurs,  ensure patient safety during seizure  - Reorient patient post seizure  - Seizure pads on all 4 side rails  - Instruct patient/family to notify RN of any seizure activity including if an aura is experienced  - Instruct patient/family to call for assistance with activity based on nursing assessment  - Administer anti-seizure medications if ordered    Outcome: Progressing  Goal: Achieves maximal functionality and self care  Description: INTERVENTIONS  - Monitor swallowing and airway patency with patient fatigue and changes in neurological status  - Encourage and assist patient to increase activity and self care.   - Encourage visually impaired, hearing impaired and aphasic patients to use assistive/communication devices  Outcome: Progressing     Problem:  CARDIOVASCULAR - ADULT  Goal: Maintains optimal cardiac output and hemodynamic stability  Description: INTERVENTIONS:  - Monitor I/O, vital signs and rhythm  - Monitor for S/S and trends of decreased cardiac output  - Administer and titrate ordered vasoactive medications to optimize hemodynamic stability  - Assess quality of pulses, skin color and temperature  - Assess for signs of decreased coronary artery perfusion  - Instruct patient to report change in severity of symptoms  Outcome: Progressing  Goal: Absence of cardiac dysrhythmias or at baseline rhythm  Description: INTERVENTIONS:  - Continuous cardiac monitoring, vital signs, obtain 12 lead EKG if ordered  - Administer antiarrhythmic and heart rate control medications as ordered  - Monitor electrolytes and administer replacement therapy as ordered  Outcome: Progressing     Problem: RESPIRATORY - ADULT  Goal: Achieves optimal ventilation and oxygenation  Description: INTERVENTIONS:  - Assess for changes in respiratory status  - Assess for changes in mentation and behavior  - Position to facilitate oxygenation and minimize respiratory effort  - Oxygen administered by appropriate delivery if ordered  - Initiate smoking cessation education as indicated  - Encourage broncho-pulmonary hygiene including cough, deep breathe, Incentive Spirometry  - Assess the need for suctioning and aspirate as needed  - Assess and instruct to report SOB or any respiratory difficulty  - Respiratory Therapy support as indicated  Outcome: Progressing     Problem: GASTROINTESTINAL - ADULT  Goal: Minimal or absence of nausea and/or vomiting  Description: INTERVENTIONS:  - Administer IV fluids if ordered to ensure adequate hydration  - Maintain NPO status until nausea and vomiting are resolved  - Nasogastric tube if ordered  - Administer ordered antiemetic medications as needed  - Provide nonpharmacologic comfort measures as appropriate  - Advance diet as tolerated, if ordered  - Consider  nutrition services referral to assist patient with adequate nutrition and appropriate food choices  Outcome: Progressing  Goal: Maintains or returns to baseline bowel function  Description: INTERVENTIONS:  - Assess bowel function  - Encourage oral fluids to ensure adequate hydration  - Administer IV fluids if ordered to ensure adequate hydration  - Administer ordered medications as needed  - Encourage mobilization and activity  - Consider nutritional services referral to assist patient with adequate nutrition and appropriate food choices  Outcome: Progressing  Goal: Maintains adequate nutritional intake  Description: INTERVENTIONS:  - Monitor percentage of each meal consumed  - Identify factors contributing to decreased intake, treat as appropriate  - Assist with meals as needed  - Monitor I&O, weight, and lab values if indicated  - Obtain nutrition services referral as needed  Outcome: Progressing  Goal: Establish and maintain optimal ostomy function  Description: INTERVENTIONS:  - Assess bowel function  - Encourage oral fluids to ensure adequate hydration  - Administer IV fluids if ordered to ensure adequate hydration   - Administer ordered medications as needed  - Encourage mobilization and activity  - Nutrition services referral to assist patient with appropriate food choices  - Assess stoma site  - Consider wound care consult   Outcome: Progressing  Goal: Oral mucous membranes remain intact  Description: INTERVENTIONS  - Assess oral mucosa and hygiene practices  - Implement preventative oral hygiene regimen  - Implement oral medicated treatments as ordered  - Initiate Nutrition services referral as needed  Outcome: Progressing     Problem: GENITOURINARY - ADULT  Goal: Maintains or returns to baseline urinary function  Description: INTERVENTIONS:  - Assess urinary function  - Encourage oral fluids to ensure adequate hydration if ordered  - Administer IV fluids as ordered to ensure adequate hydration  -  Administer ordered medications as needed  - Offer frequent toileting  - Follow urinary retention protocol if ordered  Outcome: Progressing  Goal: Absence of urinary retention  Description: INTERVENTIONS:  - Assess patient’s ability to void and empty bladder  - Monitor I/O  - Bladder scan as needed  - Discuss with physician/AP medications to alleviate retention as needed  - Discuss catheterization for long term situations as appropriate  Outcome: Progressing  Goal: Urinary catheter remains patent  Description: INTERVENTIONS:  - Assess patency of urinary catheter  - If patient has a chronic peaec, consider changing catheter if non-functioning  - Follow guidelines for intermittent irrigation of non-functioning urinary catheter  Outcome: Progressing     Problem: METABOLIC, FLUID AND ELECTROLYTES - ADULT  Goal: Electrolytes maintained within normal limits  Description: INTERVENTIONS:  - Monitor labs and assess patient for signs and symptoms of electrolyte imbalances  - Administer electrolyte replacement as ordered  - Monitor response to electrolyte replacements, including repeat lab results as appropriate  - Instruct patient on fluid and nutrition as appropriate  Outcome: Progressing  Goal: Fluid balance maintained  Description: INTERVENTIONS:  - Monitor labs   - Monitor I/O and WT  - Instruct patient on fluid and nutrition as appropriate  - Assess for signs & symptoms of volume excess or deficit  Outcome: Progressing  Goal: Glucose maintained within target range  Description: INTERVENTIONS:  - Monitor Blood Glucose as ordered  - Assess for signs and symptoms of hyperglycemia and hypoglycemia  - Administer ordered medications to maintain glucose within target range  - Assess nutritional intake and initiate nutrition service referral as needed  Outcome: Progressing     Problem: HEMATOLOGIC - ADULT  Goal: Maintains hematologic stability  Description: INTERVENTIONS  - Assess for signs and symptoms of bleeding or  hemorrhage  - Monitor labs  - Administer supportive blood products/factors as ordered and appropriate  Outcome: Progressing     Problem: MUSCULOSKELETAL - ADULT  Goal: Maintain or return mobility to safest level of function  Description: INTERVENTIONS:  - Assess patient's ability to carry out ADLs; assess patient's baseline for ADL function and identify physical deficits which impact ability to perform ADLs (bathing, care of mouth/teeth, toileting, grooming, dressing, etc.)  - Assess/evaluate cause of self-care deficits   - Assess range of motion  - Assess patient's mobility  - Assess patient's need for assistive devices and provide as appropriate  - Encourage maximum independence but intervene and supervise when necessary  - Involve family in performance of ADLs  - Assess for home care needs following discharge   - Consider OT consult to assist with ADL evaluation and planning for discharge  - Provide patient education as appropriate  Outcome: Progressing  Goal: Maintain proper alignment of affected body part  Description: INTERVENTIONS:  - Support, maintain and protect limb and body alignment  - Provide patient/ family with appropriate education  Outcome: Progressing     Problem: Nutrition/Hydration-ADULT  Goal: Nutrient/Hydration intake appropriate for improving, restoring or maintaining nutritional needs  Description: Monitor and assess patient's nutrition/hydration status for malnutrition. Collaborate with interdisciplinary team and initiate plan and interventions as ordered.  Monitor patient's weight and dietary intake as ordered or per policy. Utilize nutrition screening tool and intervene as necessary. Determine patient's food preferences and provide high-protein, high-caloric foods as appropriate.     INTERVENTIONS:  - Monitor oral intake, urinary output, labs, and treatment plans  - Assess nutrition and hydration status and recommend course of action  - Evaluate amount of meals eaten  - Assist patient with  eating if necessary   - Allow adequate time for meals  - Recommend/ encourage appropriate diets, oral nutritional supplements, and vitamin/mineral supplements  - Order, calculate, and assess calorie counts as needed  - Recommend, monitor, and adjust tube feedings and TPN/PPN based on assessed needs  - Assess need for intravenous fluids  - Provide specific nutrition/hydration education as appropriate  - Include patient/family/caregiver in decisions related to nutrition  Outcome: Progressing     Problem: Prexisting or High Potential for Compromised Skin Integrity  Goal: Skin integrity is maintained or improved  Description: INTERVENTIONS:  - Identify patients at risk for skin breakdown  - Assess and monitor skin integrity  - Assess and monitor nutrition and hydration status  - Monitor labs   - Assess for incontinence   - Turn and reposition patient  - Assist with mobility/ambulation  - Relieve pressure over bony prominences  - Avoid friction and shearing  - Provide appropriate hygiene as needed including keeping skin clean and dry  - Evaluate need for skin moisturizer/barrier cream  - Collaborate with interdisciplinary team   - Patient/family teaching  - Consider wound care consult   Outcome: Progressing

## 2024-11-29 NOTE — PROGRESS NOTES
Nutrition Follow-Up/Recommendations:    Noted TF on hold since yesterday AM.   Resume TF as medically able if pt to remain intubated:   Vital 1.2 AF @ 55mL/hr x22 hrs/day.   Recommend adding one packet Prosource Liquid Protein daily.   In total, this would provide 1512 kcals, 106g protein, 1041mL water including water for prosource administration.     Additional free water flushes per critical care; suggest at least 30mL q4 hrs to help maintain tube patency.

## 2024-11-29 NOTE — RESPIRATORY THERAPY NOTE
RT Ventilator Management Note  dAelina Soto 64 y.o. female MRN: [id number]  [facility]: [id number]      Daily Screen         11/28/2024  0813 11/29/2024  0702          Patient safety screen outcome:: -- Passed      Spont breathing trial outcome:: Failed Passed      Spont breathing trial reason failed: Hemodynamic unstable --                Physical Exam:   Assessment Type: (P) During-treatment  General Appearance: Awake  Respiratory Pattern: Assisted  Chest Assessment: Chest expansion symmetrical  Bilateral Breath Sounds: (P) Diminished, Clear  Cough: Productive  Suction: ET Tube  O2 Device: G5 Vent      Resp Comments: (P) Pt awake. Pt placed on Spont mode 8/6 40%. Will continue to monitor pt.

## 2024-11-30 LAB
ANION GAP SERPL CALCULATED.3IONS-SCNC: 8 MMOL/L (ref 4–13)
ARTERIAL PATENCY WRIST A: YES
BASE EXCESS BLDA CALC-SCNC: 4.9 MMOL/L
BODY TEMPERATURE: 97 DEGREES FEHRENHEIT
BUN SERPL-MCNC: 32 MG/DL (ref 5–25)
CALCIUM SERPL-MCNC: 8.9 MG/DL (ref 8.4–10.2)
CHLORIDE SERPL-SCNC: 101 MMOL/L (ref 96–108)
CO2 SERPL-SCNC: 30 MMOL/L (ref 21–32)
CREAT SERPL-MCNC: 1.24 MG/DL (ref 0.6–1.3)
ERYTHROCYTE [DISTWIDTH] IN BLOOD BY AUTOMATED COUNT: 20.4 % (ref 11.6–15.1)
GFR SERPL CREATININE-BSD FRML MDRD: 45 ML/MIN/1.73SQ M
GLUCOSE SERPL-MCNC: 103 MG/DL (ref 65–140)
GLUCOSE SERPL-MCNC: 109 MG/DL (ref 65–140)
GLUCOSE SERPL-MCNC: 113 MG/DL (ref 65–140)
GLUCOSE SERPL-MCNC: 127 MG/DL (ref 65–140)
GLUCOSE SERPL-MCNC: 95 MG/DL (ref 65–140)
HCO3 BLDA-SCNC: 30.4 MMOL/L (ref 22–28)
HCT VFR BLD AUTO: 28 % (ref 34.8–46.1)
HGB BLD-MCNC: 8.2 G/DL (ref 11.5–15.4)
MAGNESIUM SERPL-MCNC: 2.3 MG/DL (ref 1.9–2.7)
MCH RBC QN AUTO: 32.5 PG (ref 26.8–34.3)
MCHC RBC AUTO-ENTMCNC: 29.3 G/DL (ref 31.4–37.4)
MCV RBC AUTO: 111 FL (ref 82–98)
NRBC BLD AUTO-RTO: 1 /100 WBCS
O2 CT BLDA-SCNC: 11.8 ML/DL (ref 16–23)
OXYHGB MFR BLDA: 90.8 % (ref 94–97)
PCO2 BLDA: 50 MM HG (ref 36–44)
PCO2 TEMP ADJ BLDA: 48.1 MM HG (ref 36–44)
PH BLD: 7.42 [PH] (ref 7.35–7.45)
PH BLDA: 7.4 [PH] (ref 7.35–7.45)
PHOSPHATE SERPL-MCNC: 4.6 MG/DL (ref 2.3–4.1)
PLATELET # BLD AUTO: 367 THOUSANDS/UL (ref 149–390)
PMV BLD AUTO: 9.4 FL (ref 8.9–12.7)
PO2 BLD: 63.9 MM HG (ref 75–129)
PO2 BLDA: 67.9 MM HG (ref 75–129)
POTASSIUM SERPL-SCNC: 5 MMOL/L (ref 3.5–5.3)
PS CM H2O: 6
PS VENT FIO2: 40
RBC # BLD AUTO: 2.52 MILLION/UL (ref 3.81–5.12)
SODIUM SERPL-SCNC: 139 MMOL/L (ref 135–147)
SPECIMEN SOURCE: ABNORMAL
VANCOMYCIN TROUGH SERPL-MCNC: 21.2 UG/ML (ref 10–20)
VENT - PS: ABNORMAL
WBC # BLD AUTO: 5.76 THOUSAND/UL (ref 4.31–10.16)

## 2024-11-30 PROCEDURE — 94003 VENT MGMT INPAT SUBQ DAY: CPT

## 2024-11-30 PROCEDURE — 82805 BLOOD GASES W/O2 SATURATION: CPT | Performed by: INTERNAL MEDICINE

## 2024-11-30 PROCEDURE — 84100 ASSAY OF PHOSPHORUS: CPT

## 2024-11-30 PROCEDURE — 92610 EVALUATE SWALLOWING FUNCTION: CPT

## 2024-11-30 PROCEDURE — 80048 BASIC METABOLIC PNL TOTAL CA: CPT

## 2024-11-30 PROCEDURE — 83735 ASSAY OF MAGNESIUM: CPT

## 2024-11-30 PROCEDURE — 82948 REAGENT STRIP/BLOOD GLUCOSE: CPT

## 2024-11-30 PROCEDURE — 94669 MECHANICAL CHEST WALL OSCILL: CPT

## 2024-11-30 PROCEDURE — 94760 N-INVAS EAR/PLS OXIMETRY 1: CPT

## 2024-11-30 PROCEDURE — 94664 DEMO&/EVAL PT USE INHALER: CPT

## 2024-11-30 PROCEDURE — 99291 CRITICAL CARE FIRST HOUR: CPT | Performed by: INTERNAL MEDICINE

## 2024-11-30 PROCEDURE — 94640 AIRWAY INHALATION TREATMENT: CPT

## 2024-11-30 PROCEDURE — 80202 ASSAY OF VANCOMYCIN: CPT

## 2024-11-30 PROCEDURE — 85027 COMPLETE CBC AUTOMATED: CPT

## 2024-11-30 RX ORDER — PANTOPRAZOLE SODIUM 40 MG/10ML
40 INJECTION, POWDER, LYOPHILIZED, FOR SOLUTION INTRAVENOUS EVERY 12 HOURS SCHEDULED
Status: DISCONTINUED | OUTPATIENT
Start: 2024-11-30 | End: 2024-12-09 | Stop reason: HOSPADM

## 2024-11-30 RX ORDER — LIDOCAINE 50 MG/G
2 PATCH TOPICAL DAILY
Status: DISCONTINUED | OUTPATIENT
Start: 2024-12-01 | End: 2024-12-09 | Stop reason: HOSPADM

## 2024-11-30 RX ADMIN — FENTANYL CITRATE 25 MCG: 50 INJECTION INTRAMUSCULAR; INTRAVENOUS at 00:18

## 2024-11-30 RX ADMIN — LEVETIRACETAM 750 MG: 750 TABLET, FILM COATED ORAL at 20:20

## 2024-11-30 RX ADMIN — SERTRALINE HYDROCHLORIDE 50 MG: 50 TABLET ORAL at 08:46

## 2024-11-30 RX ADMIN — LEVETIRACETAM 750 MG: 750 TABLET, FILM COATED ORAL at 08:46

## 2024-11-30 RX ADMIN — DEXAMETHASONE SODIUM PHOSPHATE 4 MG: 4 INJECTION INTRA-ARTICULAR; INTRALESIONAL; INTRAMUSCULAR; INTRAVENOUS; SOFT TISSUE at 05:47

## 2024-11-30 RX ADMIN — CHLORHEXIDINE GLUCONATE 0.12% ORAL RINSE 15 ML: 1.2 LIQUID ORAL at 20:20

## 2024-11-30 RX ADMIN — FAMOTIDINE 20 MG: 20 TABLET, FILM COATED ORAL at 08:46

## 2024-11-30 RX ADMIN — PANTOPRAZOLE SODIUM 40 MG: 40 INJECTION, POWDER, FOR SOLUTION INTRAVENOUS at 20:20

## 2024-11-30 RX ADMIN — VALPROATE SODIUM 500 MG: 100 INJECTION, SOLUTION INTRAVENOUS at 04:32

## 2024-11-30 RX ADMIN — CHLORHEXIDINE GLUCONATE 0.12% ORAL RINSE 15 ML: 1.2 LIQUID ORAL at 08:46

## 2024-11-30 RX ADMIN — LEVALBUTEROL HYDROCHLORIDE 1.25 MG: 1.25 SOLUTION RESPIRATORY (INHALATION) at 07:21

## 2024-11-30 RX ADMIN — VALPROATE SODIUM 500 MG: 100 INJECTION, SOLUTION INTRAVENOUS at 17:00

## 2024-11-30 RX ADMIN — SODIUM CHLORIDE SOLN NEBU 3% 4 ML: 3 NEBU SOLN at 07:21

## 2024-11-30 RX ADMIN — FENTANYL CITRATE 25 MCG: 50 INJECTION INTRAMUSCULAR; INTRAVENOUS at 04:32

## 2024-11-30 RX ADMIN — VALPROATE SODIUM 500 MG: 100 INJECTION, SOLUTION INTRAVENOUS at 21:15

## 2024-11-30 RX ADMIN — Medication 2.5 MG: at 17:00

## 2024-11-30 RX ADMIN — Medication 2.5 MG: at 18:59

## 2024-11-30 RX ADMIN — THIAMINE HCL TAB 100 MG 100 MG: 100 TAB at 08:46

## 2024-11-30 RX ADMIN — MICONAZOLE NITRATE: 20 CREAM TOPICAL at 17:01

## 2024-11-30 RX ADMIN — SODIUM CHLORIDE SOLN NEBU 3% 4 ML: 3 NEBU SOLN at 13:25

## 2024-11-30 RX ADMIN — LEVALBUTEROL HYDROCHLORIDE 1.25 MG: 1.25 SOLUTION RESPIRATORY (INHALATION) at 13:25

## 2024-11-30 RX ADMIN — SODIUM CHLORIDE SOLN NEBU 3% 4 ML: 3 NEBU SOLN at 19:05

## 2024-11-30 RX ADMIN — MICONAZOLE NITRATE: 20 CREAM TOPICAL at 08:46

## 2024-11-30 RX ADMIN — LEVALBUTEROL HYDROCHLORIDE 1.25 MG: 1.25 SOLUTION RESPIRATORY (INHALATION) at 19:05

## 2024-11-30 RX ADMIN — PANTOPRAZOLE SODIUM 40 MG: 40 INJECTION, POWDER, FOR SOLUTION INTRAVENOUS at 10:00

## 2024-11-30 RX ADMIN — VALPROATE SODIUM 500 MG: 100 INJECTION, SOLUTION INTRAVENOUS at 10:00

## 2024-11-30 RX ADMIN — LEVOTHYROXINE SODIUM 100 MCG: 100 TABLET ORAL at 05:47

## 2024-11-30 RX ADMIN — ATORVASTATIN CALCIUM 10 MG: 10 TABLET, FILM COATED ORAL at 17:00

## 2024-11-30 NOTE — RESPIRATORY THERAPY NOTE
11/30/24 1049   Respiratory Assessment   Resp Comments Extubated pt to MFNC 10 L as ordered by MD. Pt tolerated well. Will continue to monitor pt. Vent stby at this time.

## 2024-11-30 NOTE — CONSULTS
Vancomycin IV Pharmacy-to-Dose Consultation     Vancomycin has been discontinued.  Pharmacy will sign off.  Please contact or re-consult with questions.    Renard Corea, Pharmacist

## 2024-11-30 NOTE — RESPIRATORY THERAPY NOTE
11/30/24 0721   Inhalation Therapy Tx   $ Inhalation Therapy Performed Yes   $ Pulse Oximetry Spot Check Charge Completed   Pre-Treatment Pulse 88   Duration 15   Breath Sounds Pre-Treatment Bilateral Diminished;Coarse   Breath Sounds Post-Treatment Bilateral Diminished;Coarse   Post-Treatment Pulse 89   Delivery Source Aerogen;Vent   Position Semi Isabel's   Treatment Tolerance Tolerated well   Resp Comments Recieved pt on SBT 6/6 40% and tolerating well. pt RSBI 55 and pt has positive cuffleak present   Oral Suctioning/Secretions   Suction Type Oral   Suction Device Yankauer   Secretion Amount Moderate   Secretion Color Clear   Secretion Consistency Thick   Suction Tolerance Tolerated well   Suctioning Adverse Effects None   Airway Suctioning/Secretions   Suction Type Endotracheal tube   Suction Device Inline   Secretion Amount Small   Secretion Color White   Secretion Consistency Thin;Thick   Suction Tolerance Tolerated well   Suctioning Adverse Effects None

## 2024-11-30 NOTE — RESPIRATORY THERAPY NOTE
11/30/24 0228   Respiratory Assessment   Resp Comments Patient transitioned to PSV.   O2 Device G5 Vent   Vent Information   Vent type Kathleen G5   Kathleen Vent Mode SPONT/TRC   $ Vent Daily Charge-Subsequent Yes   $ Pulse Oximetry Spot Check Charge Completed   SPONT/TRC Settings   FIO2 (%) 40 %   PEEP (cmH2O) 6 cmH2O   Tube Comp (%) 80 %   Flow Trigger (LPM) 5 LPM   Pressure Support (cmH2O) 6 cmH2O   ETS (%) 25 %   Tube Type  ET   Tube Size (I.D.) (mm) 7.5   Humidification Heater   Heater Temp 98.6 °F (37 °C)   SPONT/TRC Actuals   Resp Rate (BPM) 15 BPM   VT (mL) 459 mL   MV (Obs) 7.3   MAP (cmH2O) 8.5 cmH2O   Peak Pressure (cmH2O) 15 cmH2O   I:E Ratio (Obs) 1:1.6   RSBI (f/Vt) 41 f/Vt   SPONT/TRC Alarms   High Peak Pressure (cmH2O) 40 cm H2O   Low Pressure (cmH2O) 5 cm H2O   High Resp Rate (BPM) 40 BPM   Low Resp Rate (BPM) 8 BPM   High MV (L/min) 20 L/min   Low MV (L/min) 4 L/min   High Spont VTE (mL) 1000 mL   Low Spont VTE (mL) 150 mL   Apnea Time (s) 20 S   SPONT/TRC Apnea Settings   Resp Rate (BPM) 14 BPM   Apnea Time (S) 20 S   %TI (%)   (1 sec)   Pcontrol (cmH2O) 12 cm H2O   Maintenance   Water bag changed No   Circuit changed No

## 2024-11-30 NOTE — PROGRESS NOTES
Progress Note - Critical Care/ICU   Name: Adelina Soto 64 y.o. female I MRN: [id number]  [facility] I Date of Admission: 11/12/2024   Date of Service: 11/30/2024 I Hospital Day: 18       Summary:  Patient is a 64 year old female with history of Afib on Warfarin, HTN, T2DM, morbid obesity, HLD, hypothyroidism, initially presented to [facility] on 11/12 after being found by her partner at home with AMS, seizure-like activity. EMS witnessed tonic-clonic seizure-like activity, treated with 6 mg of Versed. 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 Was subsequently intubated for airway protection and transferred to hospitals. Initial LP generally unremarkable apart from RBC's 300's from otherwise atraumatic tap, negative ME panel and HSV. 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     [facility], during evaluation pressors were weaned and AEDs adjusted. Underwent CT head which appeared unchanged, LP on 11/19 with fluoro unsuccessful. Off video EEG 11/20, repeat MRI showed no acute findings. Minimal improvements in neurological exam thus far, does open eyes to voice and blink on command. Ongoing discussion regarding goals of care, tracheostomy/feeding tube in LTAC versus comfort care, POA thus far remains treatment oriented. Had family meeting 11/25, declined tracheostomy and also would not wish to have her remain in an LTAC / nursing home long term. Additionally discussed 11/26 that she would not like to be resuscitated in the event of a cardiac arrest, code status updated to Level 2 DNR/DNI.     Overall very gradual improvements in neurological exam day-to-day, able to breath spontaneously on the ventilator and follow basic commands.  Much more alert today and able to raise her head off the bed, doing well on spontaneous 8/6 with occasional lower volumes. Will consider extubating to BiPAP today.    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, started on acyclovir  Neurology following, vEEG initially showing left frontal seizures, burst suppressed initially, now on Keppra 1g q12h, Phenytoin 100 mg q8h, Depacon 375 mg q6h  LP showed glucose 89, protein 74, 339 RBC's, 2 WBC's, ME panel negative  ID following, completed acyclovir 10 mg/kg every 12 hours through 11/26.  Off vEEG 11/20  Repeat MRI 11/20 unremarkable  More agitation, getting PRN Fentanyl doses; otherwise exam continues to very gradually improve day-to-day     Diagnosis: H/o depression  Plan:  Continue home Zoloft     CV:   Diagnosis: Shock/hypotension, likely in setting of sedation - resolved  Plan:  MAP 65-70, off pressors      Diagnosis: H/o Afib on Warfarin  Plan:  Lopressor 50 mg PO TID; has been meeting hold parameters frequently due to hypotension, however rate relatively controlled < 110bpm  AC with Lovenox 1 mg/kg BID  Consider amiodarone for rate control if persistently > 110 bpm     Pulm:  Diagnosis: Acute hypoxic respiratory failure  Intubated on arrival for airway protection due to AMS  Vent settings: PCMV, 14/12/6/40%  Bronch 11/12 with generalized edematous friable mucosa and moderate thick purulent secretions, culture with mixed respiratory viktoriya  Initial chest x-ray on arrival with mild edema more so on the right, left-sided whiteout likely from mucous plugging and atelectasis, given improvement after bronchoscopy, now continues to have crackles bilaterally  Repeat bronchoscopy 11/20 generally unremarkable  SAT/SBT daily  Consider extubating today to BiPAP    GI:   Diagnosis: GERD  Plan:  Continue home pepcid    Diagnosis: Rectal bleeding  Noted to have some  rectal bleeding with a blood clot near rectal tube site 11/26  Patient does reportedly have history of internal hemorrhoids, suspect this was likely related to trauma from rectal tube insertion or manipulation  Removed, continue to monitor for any persistent clots/bleeding     :   Diagnosis: CKD  Baseline Cr 1.4-1.8  Remains baseline throughout hospital stay  Monitor renal fx, monitor I/O's, avoid nephrotoxins     F/E/N:   F - none  E - K > 4, Mg > 2, Phos > 3  N - TF with Vital 1.2 @ 55/hr     Heme/Onc:   Diagnosis: Anemia  Hgb 9-10 this admission  Transfused 1u pRBC's 11/28 due to drop to 6.7, found to have small hemmorhage in the left paraspinal muscle     Endo:   Diagnosis: Hypothyroidism  TSH 0.29, T4 1.23  Continue home levothyroxine     Diagnosis: T2DM  Diet controlled, not on medications outpatient  Monitor BG for goal 140-180  Add SSI if needed     ID:   Diagnosis: HSV encephalitis  Initial MRI brain w/ c/f HSV encephalitis based on left temporal enhancement  LP and ME panel neg, unfortunately unable to obtain second CSF analyses for confirmation  ID following, Acyclovir 10 mg/kg q12h through 11/26  Repeat infectious w/u if repeatedly elevated temp     Diagnosis: UTI, possible pneumonia  Urine culture growing E. coli and ESBL proteus  Earlier bronchoscopy with copious purulent secretions, however culture with mixed respiratory viktoriya  Not treated earlier in admission given lack of fever, improvement in leukocytosis     MSK/Skin:   Diagnosis: Pressure wounds  CTA imaging 11/28 notable for small bleed in the left posterior paraspinal musculature ~L4-L5; no retroperitoneal hemmorhage, hgb has since been stable after 1u pRBC's  11/29 noted to have left medial thigh erythema/fluctuance concerning for abscess, underwent I&D with serosanguineous drainage    Diagnosis: Pressure wounds  Continue wound care     Disposition: Critical care    ICU Core Measures     Vented Patient  VAP Bundle  VAP bundle ordered     A:  Assess, Prevent, and Manage Pain Has pain been assessed? Yes  Need for changes to pain regimen? No   B: Both Spontaneous Awakening Trials (SATs) and Spontaneous Breathing Trials (SBTs) Plan to perform spontaneous awakening trial today? Yes   Plan to perform spontaneous breathing trial today? Yes   Obvious barriers to extubation? No   C: Choice of Sedation RASS Goal: 0 Alert and Calm  Need for changes to sedation or analgesia regimen? No   D: Delirium CAM-ICU: Unable to perform secondary to Acute cognitive dysfunction   E: Early Mobility  Plan for early mobility? NA   F: Family Engagement Plan for family engagement today? Yes       Review of Invasive Devices:    Stringer Plan: Continue secondary to wounds that would be worsened by incontinence        Prophylaxis:  VTE VTE covered by:    None       Stress Ulcer  covered byfamotidine (PEPCID) 20 mg tablet [[id number]] (Long-Term Med), famotidine (PEPCID) tablet 20 mg [[id number]], pantoprazole (PROTONIX) 40 mg tablet [[id number]] (Long-Term Med)         24 Hour Events : Overnight occasionally agitated, improved with dose of 25 mcg Fentanyl. Pulling lower volumes on spontaneous than previously, improved with some pressure support. Blood pressures remain soft but stable, MAP occasionally mid 50's to 60's.     Subjective   Review of Systems: Review of Systems not obtainable due to Clinical Condition    Objective :                   Vitals I/O      Most Recent Min/Max in 24hrs   Temp 97.5 °F (36.4 °C) Temp  Min: 96.8 °F (36 °C)  Max: 98.1 °F (36.7 °C)   Pulse 91 Pulse  Min: 83  Max: 101   Resp 15 Resp  Min: 15  Max: 34   /70 BP  Min: 84/48  Max: 138/64   O2 Sat 93 % SpO2  Min: 90 %  Max: 98 %      Intake/Output Summary (Last 24 hours) at 11/30/2024 0828  Last data filed at 11/30/2024 0610  Gross per 24 hour   Intake 1137.86 ml   Output 1300 ml   Net -162.14 ml       Diet NPO    Invasive Monitoring           Physical Exam   Physical Exam  Vitals reviewed.   Eyes:       Pupils: Pupils are equal, round, and reactive to light.   Skin:     General: Skin is warm and dry.   HENT:      Head: Normocephalic.      Mouth/Throat:      Mouth: Mucous membranes are dry.   Cardiovascular:      Rate and Rhythm: Normal rate. Rhythm irregular.   Musculoskeletal:      Right lower le+ Edema present.      Left lower le+ Edema present.   Abdominal:      Palpations: Abdomen is soft.      Tenderness: There is no abdominal tenderness.   Constitutional:       Appearance: She is ill-appearing.      Interventions: She is intubated. She is not sedated.  Pulmonary:      Effort: Pulmonary effort is normal. She is intubated.   Neurological:      Mental Status: She is alert. She is calm.      Comments: Opens eyes to voice, able to nod/shake head in response to questions, LUE > RUE strength and able to lift both legs off the bed, able to raise her head.   Genitourinary/Anorectal:  Stringer present.        Diagnostic Studies        Lab Results: I have reviewed the following results:     Medications:  Scheduled PRN   atorvastatin, 10 mg, Daily With Dinner  chlorhexidine, 15 mL, Q12H LINO  famotidine, 20 mg, Daily  levalbuterol, 1.25 mg, TID  levETIRAcetam, 750 mg, Q12H LINO  levothyroxine, 100 mcg, Early Morning  EARLINE ANTIFUNGAL, , BID  sertraline, 50 mg, Daily  sodium chloride, 4 mL, TID  thiamine, 100 mg, Daily  valproate sodium, 500 mg, Q6H LINO  vancomycin, 1,500 mg, Q24H      acetaminophen, 650 mg, Q4H PRN  albuterol, 2.5 mg, Q6H PRN  fentaNYL, 25 mcg, Q1H PRN  LORazepam, 2 mg, Q4H PRN  ondansetron, 4 mg, Q4H PRN  oxyCODONE, 5 mg, Q6H PRN  oxyCODONE, 2.5 mg, Q6H PRN       Continuous    norepinephrine, 1-30 mcg/min, Last Rate: Stopped (24 0610)         Labs:   CBC    Recent Labs     24  0547   WBC 7.17 5.76   HGB 7.8* 8.2*   HCT 26.1* 28.0*    367     BMP    Recent Labs     24  0547   SODIUM 144 139   K 4.1 5.0    101   CO2 32 30   AGAP 9 8   BUN 26*  32*   CREATININE 1.28 1.24   CALCIUM 9.1 8.9       Coags    No recent results     Additional Electrolytes  Recent Labs     11/29/24  0453 11/30/24  0547   MG 2.2 2.3   PHOS 4.4* 4.6*          Blood Gas    Recent Labs     11/29/24  1051   PHART 7.393   WUY3YLO 51.3*   PO2ART 115.8   JLX6ANH 30.6*   BEART 4.9   SOURCE Line, Arterial       Recent Labs     11/29/24  1051   SOURCE Line, Arterial      LFTs  No recent results    Infectious  No recent results  Glucose  Recent Labs     11/29/24  0453 11/30/24  0547   GLUC 106 127

## 2024-11-30 NOTE — PLAN OF CARE
Problem: PAIN - ADULT  Goal: Verbalizes/displays adequate comfort level or baseline comfort level  Description: Interventions:  - Encourage patient to monitor pain and request assistance  - Assess pain using appropriate pain scale  - Administer analgesics based on type and severity of pain and evaluate response  - Implement non-pharmacological measures as appropriate and evaluate response  - Consider cultural and social influences on pain and pain management  - Notify physician/advanced practitioner if interventions unsuccessful or patient reports new pain  Outcome: Progressing     Problem: DISCHARGE PLANNING  Goal: Discharge to home or other facility with appropriate resources  Description: INTERVENTIONS:  - Identify barriers to discharge w/patient and caregiver  - Arrange for needed discharge resources and transportation as appropriate  - Identify discharge learning needs (meds, wound care, etc.)  - Arrange for interpretive services to assist at discharge as needed  - Refer to Case Management Department for coordinating discharge planning if the patient needs post-hospital services based on physician/advanced practitioner order or complex needs related to functional status, cognitive ability, or social support system  Outcome: Progressing     Problem: Knowledge Deficit  Goal: Patient/family/caregiver demonstrates understanding of disease process, treatment plan, medications, and discharge instructions  Description: Complete learning assessment and assess knowledge base.  Interventions:  - Provide teaching at level of understanding  - Provide teaching via preferred learning methods  Outcome: Progressing     Problem: NEUROSENSORY - ADULT  Goal: Achieves stable or improved neurological status  Description: INTERVENTIONS  - Monitor and report changes in neurological status  - Monitor vital signs such as temperature, blood pressure, glucose, and any other labs ordered   - Initiate measures to prevent increased  intracranial pressure  - Monitor for seizure activity and implement precautions if appropriate      Outcome: Progressing  Goal: Remains free of injury related to seizures activity  Description: INTERVENTIONS  - Maintain airway, patient safety  and administer oxygen as ordered  - Monitor patient for seizure activity, document and report duration and description of seizure to physician/advanced practitioner  - If seizure occurs,  ensure patient safety during seizure  - Reorient patient post seizure  - Seizure pads on all 4 side rails  - Instruct patient/family to notify RN of any seizure activity including if an aura is experienced  - Instruct patient/family to call for assistance with activity based on nursing assessment  - Administer anti-seizure medications if ordered    Outcome: Progressing  Goal: Achieves maximal functionality and self care  Description: INTERVENTIONS  - Monitor swallowing and airway patency with patient fatigue and changes in neurological status  - Encourage and assist patient to increase activity and self care.   - Encourage visually impaired, hearing impaired and aphasic patients to use assistive/communication devices  Outcome: Progressing     Problem: CARDIOVASCULAR - ADULT  Goal: Maintains optimal cardiac output and hemodynamic stability  Description: INTERVENTIONS:  - Monitor I/O, vital signs and rhythm  - Monitor for S/S and trends of decreased cardiac output  - Administer and titrate ordered vasoactive medications to optimize hemodynamic stability  - Assess quality of pulses, skin color and temperature  - Assess for signs of decreased coronary artery perfusion  - Instruct patient to report change in severity of symptoms  Outcome: Progressing  Goal: Absence of cardiac dysrhythmias or at baseline rhythm  Description: INTERVENTIONS:  - Continuous cardiac monitoring, vital signs, obtain 12 lead EKG if ordered  - Administer antiarrhythmic and heart rate control medications as ordered  -  Monitor electrolytes and administer replacement therapy as ordered  Outcome: Progressing     Problem: RESPIRATORY - ADULT  Goal: Achieves optimal ventilation and oxygenation  Description: INTERVENTIONS:  - Assess for changes in respiratory status  - Assess for changes in mentation and behavior  - Position to facilitate oxygenation and minimize respiratory effort  - Oxygen administered by appropriate delivery if ordered  - Initiate smoking cessation education as indicated  - Encourage broncho-pulmonary hygiene including cough, deep breathe, Incentive Spirometry  - Assess the need for suctioning and aspirate as needed  - Assess and instruct to report SOB or any respiratory difficulty  - Respiratory Therapy support as indicated  Outcome: Progressing     Problem: GASTROINTESTINAL - ADULT  Goal: Minimal or absence of nausea and/or vomiting  Description: INTERVENTIONS:  - Administer IV fluids if ordered to ensure adequate hydration  - Maintain NPO status until nausea and vomiting are resolved  - Nasogastric tube if ordered  - Administer ordered antiemetic medications as needed  - Provide nonpharmacologic comfort measures as appropriate  - Advance diet as tolerated, if ordered  - Consider nutrition services referral to assist patient with adequate nutrition and appropriate food choices  Outcome: Progressing  Goal: Maintains or returns to baseline bowel function  Description: INTERVENTIONS:  - Assess bowel function  - Encourage oral fluids to ensure adequate hydration  - Administer IV fluids if ordered to ensure adequate hydration  - Administer ordered medications as needed  - Encourage mobilization and activity  - Consider nutritional services referral to assist patient with adequate nutrition and appropriate food choices  Outcome: Progressing  Goal: Maintains adequate nutritional intake  Description: INTERVENTIONS:  - Monitor percentage of each meal consumed  - Identify factors contributing to decreased intake, treat as  appropriate  - Assist with meals as needed  - Monitor I&O, weight, and lab values if indicated  - Obtain nutrition services referral as needed  Outcome: Progressing  Goal: Establish and maintain optimal ostomy function  Description: INTERVENTIONS:  - Assess bowel function  - Encourage oral fluids to ensure adequate hydration  - Administer IV fluids if ordered to ensure adequate hydration   - Administer ordered medications as needed  - Encourage mobilization and activity  - Nutrition services referral to assist patient with appropriate food choices  - Assess stoma site  - Consider wound care consult   Outcome: Progressing  Goal: Oral mucous membranes remain intact  Description: INTERVENTIONS  - Assess oral mucosa and hygiene practices  - Implement preventative oral hygiene regimen  - Implement oral medicated treatments as ordered  - Initiate Nutrition services referral as needed  Outcome: Progressing     Problem: GENITOURINARY - ADULT  Goal: Maintains or returns to baseline urinary function  Description: INTERVENTIONS:  - Assess urinary function  - Encourage oral fluids to ensure adequate hydration if ordered  - Administer IV fluids as ordered to ensure adequate hydration  - Administer ordered medications as needed  - Offer frequent toileting  - Follow urinary retention protocol if ordered  Outcome: Progressing  Goal: Absence of urinary retention  Description: INTERVENTIONS:  - Assess patient’s ability to void and empty bladder  - Monitor I/O  - Bladder scan as needed  - Discuss with physician/AP medications to alleviate retention as needed  - Discuss catheterization for long term situations as appropriate  Outcome: Progressing  Goal: Urinary catheter remains patent  Description: INTERVENTIONS:  - Assess patency of urinary catheter  - If patient has a chronic peace, consider changing catheter if non-functioning  - Follow guidelines for intermittent irrigation of non-functioning urinary catheter  Outcome:  Progressing     Problem: METABOLIC, FLUID AND ELECTROLYTES - ADULT  Goal: Electrolytes maintained within normal limits  Description: INTERVENTIONS:  - Monitor labs and assess patient for signs and symptoms of electrolyte imbalances  - Administer electrolyte replacement as ordered  - Monitor response to electrolyte replacements, including repeat lab results as appropriate  - Instruct patient on fluid and nutrition as appropriate  Outcome: Progressing  Goal: Fluid balance maintained  Description: INTERVENTIONS:  - Monitor labs   - Monitor I/O and WT  - Instruct patient on fluid and nutrition as appropriate  - Assess for signs & symptoms of volume excess or deficit  Outcome: Progressing  Goal: Glucose maintained within target range  Description: INTERVENTIONS:  - Monitor Blood Glucose as ordered  - Assess for signs and symptoms of hyperglycemia and hypoglycemia  - Administer ordered medications to maintain glucose within target range  - Assess nutritional intake and initiate nutrition service referral as needed  Outcome: Progressing     Problem: Nutrition/Hydration-ADULT  Goal: Nutrient/Hydration intake appropriate for improving, restoring or maintaining nutritional needs  Description: Monitor and assess patient's nutrition/hydration status for malnutrition. Collaborate with interdisciplinary team and initiate plan and interventions as ordered.  Monitor patient's weight and dietary intake as ordered or per policy. Utilize nutrition screening tool and intervene as necessary. Determine patient's food preferences and provide high-protein, high-caloric foods as appropriate.     INTERVENTIONS:  - Monitor oral intake, urinary output, labs, and treatment plans  - Assess nutrition and hydration status and recommend course of action  - Evaluate amount of meals eaten  - Assist patient with eating if necessary   - Allow adequate time for meals  - Recommend/ encourage appropriate diets, oral nutritional supplements, and  vitamin/mineral supplements  - Order, calculate, and assess calorie counts as needed  - Recommend, monitor, and adjust tube feedings and TPN/PPN based on assessed needs  - Assess need for intravenous fluids  - Provide specific nutrition/hydration education as appropriate  - Include patient/family/caregiver in decisions related to nutrition  Outcome: Progressing     Problem: Prexisting or High Potential for Compromised Skin Integrity  Goal: Skin integrity is maintained or improved  Description: INTERVENTIONS:  - Identify patients at risk for skin breakdown  - Assess and monitor skin integrity  - Assess and monitor nutrition and hydration status  - Monitor labs   - Assess for incontinence   - Turn and reposition patient  - Assist with mobility/ambulation  - Relieve pressure over bony prominences  - Avoid friction and shearing  - Provide appropriate hygiene as needed including keeping skin clean and dry  - Evaluate need for skin moisturizer/barrier cream  - Collaborate with interdisciplinary team   - Patient/family teaching  - Consider wound care consult   Outcome: Progressing

## 2024-11-30 NOTE — SPEECH THERAPY NOTE
Speech Language/Pathology  Speech-Language Pathology Bedside Swallow Evaluation      Patient Name: Adelina Soto    Today's Date: 11/30/2024     Problem List  Principal Problem:    Seizure (HCC)  Active Problems:    Dyslipidemia    Paroxysmal atrial fibrillation (HCC)    COPD with asthma (HCC)    Hypothyroid    GERD (gastroesophageal reflux disease)    Anxiety    Morbid obesity with BMI of 50.0-59.9, adult (HCC)    Depression, recurrent (HCC)    CKD (chronic kidney disease)    Type 2 diabetes mellitus with hyperglycemia, unspecified whether long term insulin use (HCC)    Abnormal EKG    Elevated troponin level not due myocardial infarction    Bacteriuria    Shock (HCC)    Acute hypoxic respiratory failure (HCC)    Bacteremia due to Staphylococcus      Past Medical History  Past Medical History:   Diagnosis Date    Anemia     Arthritis     Cancer of kidney (HCC)     Chronic kidney disease     Diabetes mellitus (HCC)     Disease of thyroid gland     HLD (hyperlipidemia)     Hypertension     Ovarian cancer (HCC)     Pneumonia        Past Surgical History  Past Surgical History:   Procedure Laterality Date    CHOLECYSTECTOMY      CT GUIDED PERC DRAINAGE CATHETER PLACEMENT  5/16/2016    FL LUMBAR PUNCTURE DIAGNOSTIC  11/19/2024    GALLBLADDER SURGERY  05/01/2004    HYSTERECTOMY  06/01/2018    NEPHRECTOMY Right 08/02/2018       Summary   Pt presented with s/s suggestive of mild oral and suspected minimal and mild pharyngeal dysphagia.  Symptoms or concerns included decreased bolus propulsion, decreased mastication, decreased bolus formation, and oral residue with soft solids,  suspected decreased hyolaryngeal elevation upon palpation, suspected pharyngeal residue, and multiple swallows.  No gross coughing w/ any liquids.  Cough noted w/ the soft cracker in attempt to clear it .  It was unsuccessful.     Risk/s for Aspiration: recent intubation     Recommended Diet: puree/level 1 diet and thin liquids   Recommended  Form of Meds: crushed with puree   Aspiration precautions and swallowing strategies: upright posture, small bites/sips, and alternating bites and sips  Other Recommendations: Continue frequent oral care, will follow for         Current Medical Status  Pt is a 64 y.o. female who presented to  [facility] on 11/12/24   after being found down and unresponsive. Seizure activity noted.  On arrival to ED patient had left gaze preference CTA head and neck negative for acute etiology. MRI concerning for acute HSV infection. Intubated for airway protection due to status epilepticus, transferred to [facility] for further management.  Pt transferred for EEG monitoring.    Pt was intubated for airway protection for 3 weeks, extubated this morning.    Current Precautions:  Fall  Aspiration    Allergies:  No known food allergies    Past medical history:  Please see H&P for details    Special Studies:  CT abd/pelvis-Suspicion of a small amount of hemorrhage within the left posterior paraspinal musculature at L4-L5 as described above and new since 12/11/2022. Otherwise no definite evidence for retroperitoneal hemorrhage or rectus abdominal sheath hemorrhage as   clinically questioned.     Large ventral abdominal wall hernia containing nonobstructed bowel.     Small left pleural effusion. Left lower lobe atelectasis is noted.    Social/Education/Vocational Hx:  Pt lives  with SO/caregiver    Swallow Information   Current Risks for Dysphagia & Aspiration: recent intubation and prolonged intubation  Current Symptoms/Concerns:  weak cough  Current Diet: NPO   Baseline Diet: regular diet and thin liquids      Baseline Assessment   Behavior/Cognition: alert  Speech/Language Status: able to participate in conversation and able to follow commands  Patient Positioning: upright in bed  Pain Status/Interventions/Response to Interventions:   No report of or nonverbal indications of pain.       Swallow Mechanism Exam  Facial: left facial  droop  Labial: decreased ROM left side  Lingual: decreased ROM  Velum: unable to visualize  Mandible: adequate ROM  Dentition: limited dentition and poor condition  Vocal quality:clear/adequate and weak   Volitional Cough:  fair, mildly congested when coughing    Respiratory Status: on NC  Consistencies Assessed and Performance   Consistencies Administered: ice chips, thin liquids, nectar thick, puree, and mechanical soft solids  Materials administered included cookie, applesauce    Oral Stage: mild, decreased bolus propulsion, and decreased mastication  Prolonged ineffective breakdown of the soft solid.  Pt unable to transfer it.  Able to draw from the straw w/ control.      Pharyngeal Stage: suspected, minimal, mild, suspected decreased hyolaryngeal elevation upon palpation, suspected pharyngeal residue, and multiple swallows  Fairly prompt swallows suspected.  Pt uses mult swallows.  No coughing w/thin.  Coughing with the soft solid, pt would not take more.      Esophageal Concerns: none reported    Strategies and Efficacy: -    Summary and Recommendations (see above)    Results Reviewed with: patient and RN     Treatment Recommended: yes     Frequency of treatment: as able     Patient Stated Goal:    Dysphagia LTG  -Patient will demonstrate safe and effective oral intake (without overt s/s significant oral/pharyngeal dysphagia including s/s penetration or aspiration) for the highest appropriate diet level.     Short Term Goals:  -Pt will tolerate Dysphagia 1/pureed diet and  thin liquid with no significant s/s oral or pharyngeal dysphagia across 1-3 diagnostic session/s    -Patient will tolerate trials of upgraded food and/or liquid texture with no significant s/s of oral or pharyngeal dysphagia including aspiration across 1-3 diagnostic sessions     -Patient will comply with a Video/Modified Barium Swallow study for more complete assessment of swallowing anatomy/physiology/aspiration risk and to assess efficacy  of treatment techniques so as to best guide treatment plan    Speech Therapy Prognosis   Prognosis: fair    Prognosis Considerations: medical status and cognitive status

## 2024-11-30 NOTE — PROGRESS NOTES
Adelina Soto is a 64 y.o. female who is currently ordered Vancomycin IV with management by the Pharmacy Consult service.  Relevant clinical data and objective / subjective history reviewed.  Vancomycin Assessment:  Indication and Goal AUC/Trough: Soft tissue (goal -600, trough >10), -600, trough >10  Clinical Status: worsening  Micro:   Blood culture no growth after 5 days; prior growth staph epdermidis, gram pos cocci in clusters  Renal Function:  SCr: 1.28 mg/dL  CrCl: 56.4 mL/min  Renal replacement: Not on dialysis  Days of Therapy: 1  Current Dose: 2000 mg IV load; 1500 mg IV Q24H  Vancomycin Plan:  New Dosin mg IV Q24H  Estimated AUC: 582 mcg*hr/mL  Estimated Trough: 17.1 mcg/mL  Next Level: 2024 at 0600  Renal Function Monitoring: Daily BMP and UOP  Pharmacy will continue to follow closely for s/sx of nephrotoxicity, infusion reactions and appropriateness of therapy.  BMP and CBC will be ordered per protocol. We will continue to follow the patient’s culture results and clinical progress daily.    Renard Corea, Pharmacist

## 2024-12-01 LAB
ANION GAP SERPL CALCULATED.3IONS-SCNC: 8 MMOL/L (ref 4–13)
APTT PPP: 117 SECONDS (ref 23–34)
APTT PPP: 34 SECONDS (ref 23–34)
BASOPHILS # BLD AUTO: 0.02 THOUSANDS/ÂΜL (ref 0–0.1)
BASOPHILS NFR BLD AUTO: 0 % (ref 0–1)
BUN SERPL-MCNC: 37 MG/DL (ref 5–25)
CALCIUM SERPL-MCNC: 8.9 MG/DL (ref 8.4–10.2)
CHLORIDE SERPL-SCNC: 104 MMOL/L (ref 96–108)
CO2 SERPL-SCNC: 32 MMOL/L (ref 21–32)
CREAT SERPL-MCNC: 1.25 MG/DL (ref 0.6–1.3)
EOSINOPHIL # BLD AUTO: 0.01 THOUSAND/ÂΜL (ref 0–0.61)
EOSINOPHIL NFR BLD AUTO: 0 % (ref 0–6)
ERYTHROCYTE [DISTWIDTH] IN BLOOD BY AUTOMATED COUNT: 21.1 % (ref 11.6–15.1)
GFR SERPL CREATININE-BSD FRML MDRD: 45 ML/MIN/1.73SQ M
GLUCOSE SERPL-MCNC: 87 MG/DL (ref 65–140)
GLUCOSE SERPL-MCNC: 91 MG/DL (ref 65–140)
GLUCOSE SERPL-MCNC: 98 MG/DL (ref 65–140)
HCT VFR BLD AUTO: 28.4 % (ref 34.8–46.1)
HGB BLD-MCNC: 8.1 G/DL (ref 11.5–15.4)
IMM GRANULOCYTES # BLD AUTO: 0.14 THOUSAND/UL (ref 0–0.2)
IMM GRANULOCYTES NFR BLD AUTO: 2 % (ref 0–2)
INR PPP: 1.22 (ref 0.85–1.19)
LYMPHOCYTES # BLD AUTO: 1.21 THOUSANDS/ÂΜL (ref 0.6–4.47)
LYMPHOCYTES NFR BLD AUTO: 21 % (ref 14–44)
MAGNESIUM SERPL-MCNC: 2.2 MG/DL (ref 1.9–2.7)
MCH RBC QN AUTO: 32.8 PG (ref 26.8–34.3)
MCHC RBC AUTO-ENTMCNC: 28.5 G/DL (ref 31.4–37.4)
MCV RBC AUTO: 115 FL (ref 82–98)
MONOCYTES # BLD AUTO: 0.57 THOUSAND/ÂΜL (ref 0.17–1.22)
MONOCYTES NFR BLD AUTO: 10 % (ref 4–12)
NEUTROPHILS # BLD AUTO: 3.86 THOUSANDS/ÂΜL (ref 1.85–7.62)
NEUTS SEG NFR BLD AUTO: 67 % (ref 43–75)
NRBC BLD AUTO-RTO: 1 /100 WBCS
PHOSPHATE SERPL-MCNC: 3.9 MG/DL (ref 2.3–4.1)
PLATELET # BLD AUTO: 394 THOUSANDS/UL (ref 149–390)
PMV BLD AUTO: 9.2 FL (ref 8.9–12.7)
POTASSIUM SERPL-SCNC: 3.8 MMOL/L (ref 3.5–5.3)
PROTHROMBIN TIME: 15.7 SECONDS (ref 12.3–15)
RBC # BLD AUTO: 2.47 MILLION/UL (ref 3.81–5.12)
SODIUM SERPL-SCNC: 144 MMOL/L (ref 135–147)
WBC # BLD AUTO: 5.81 THOUSAND/UL (ref 4.31–10.16)

## 2024-12-01 PROCEDURE — 85610 PROTHROMBIN TIME: CPT | Performed by: STUDENT IN AN ORGANIZED HEALTH CARE EDUCATION/TRAINING PROGRAM

## 2024-12-01 PROCEDURE — 94669 MECHANICAL CHEST WALL OSCILL: CPT

## 2024-12-01 PROCEDURE — 84100 ASSAY OF PHOSPHORUS: CPT

## 2024-12-01 PROCEDURE — 85730 THROMBOPLASTIN TIME PARTIAL: CPT | Performed by: STUDENT IN AN ORGANIZED HEALTH CARE EDUCATION/TRAINING PROGRAM

## 2024-12-01 PROCEDURE — 99291 CRITICAL CARE FIRST HOUR: CPT | Performed by: INTERNAL MEDICINE

## 2024-12-01 PROCEDURE — 94640 AIRWAY INHALATION TREATMENT: CPT

## 2024-12-01 PROCEDURE — 94760 N-INVAS EAR/PLS OXIMETRY 1: CPT

## 2024-12-01 PROCEDURE — 83735 ASSAY OF MAGNESIUM: CPT

## 2024-12-01 PROCEDURE — 94664 DEMO&/EVAL PT USE INHALER: CPT

## 2024-12-01 PROCEDURE — 80048 BASIC METABOLIC PNL TOTAL CA: CPT

## 2024-12-01 PROCEDURE — 82948 REAGENT STRIP/BLOOD GLUCOSE: CPT

## 2024-12-01 PROCEDURE — 85025 COMPLETE CBC W/AUTO DIFF WBC: CPT

## 2024-12-01 RX ORDER — POTASSIUM CHLORIDE 14.9 MG/ML
20 INJECTION INTRAVENOUS
Status: DISPENSED | OUTPATIENT
Start: 2024-12-01 | End: 2024-12-01

## 2024-12-01 RX ORDER — POTASSIUM CHLORIDE 1500 MG/1
20 TABLET, EXTENDED RELEASE ORAL ONCE
Status: DISCONTINUED | OUTPATIENT
Start: 2024-12-01 | End: 2024-12-01

## 2024-12-01 RX ORDER — HEPARIN SODIUM 1000 [USP'U]/ML
5000 INJECTION, SOLUTION INTRAVENOUS; SUBCUTANEOUS EVERY 6 HOURS PRN
Status: DISCONTINUED | OUTPATIENT
Start: 2024-12-01 | End: 2024-12-08

## 2024-12-01 RX ORDER — HEPARIN SODIUM 10000 [USP'U]/100ML
3-30 INJECTION, SOLUTION INTRAVENOUS
Status: DISCONTINUED | OUTPATIENT
Start: 2024-12-01 | End: 2024-12-08

## 2024-12-01 RX ORDER — ALBUMIN HUMAN 50 G/1000ML
25 SOLUTION INTRAVENOUS ONCE
Status: COMPLETED | OUTPATIENT
Start: 2024-12-01 | End: 2024-12-01

## 2024-12-01 RX ORDER — HEPARIN SODIUM 1000 [USP'U]/ML
10000 INJECTION, SOLUTION INTRAVENOUS; SUBCUTANEOUS EVERY 6 HOURS PRN
Status: DISCONTINUED | OUTPATIENT
Start: 2024-12-01 | End: 2024-12-08

## 2024-12-01 RX ADMIN — SODIUM CHLORIDE SOLN NEBU 3% 4 ML: 3 NEBU SOLN at 07:02

## 2024-12-01 RX ADMIN — LEVALBUTEROL HYDROCHLORIDE 1.25 MG: 1.25 SOLUTION RESPIRATORY (INHALATION) at 07:02

## 2024-12-01 RX ADMIN — HEPARIN SODIUM 15 UNITS/KG/HR: 10000 INJECTION, SOLUTION INTRAVENOUS at 22:32

## 2024-12-01 RX ADMIN — CHLORHEXIDINE GLUCONATE 0.12% ORAL RINSE 15 ML: 1.2 LIQUID ORAL at 20:38

## 2024-12-01 RX ADMIN — MICONAZOLE NITRATE: 20 CREAM TOPICAL at 09:51

## 2024-12-01 RX ADMIN — HEPARIN SODIUM 18 UNITS/KG/HR: 10000 INJECTION, SOLUTION INTRAVENOUS at 10:30

## 2024-12-01 RX ADMIN — MICONAZOLE NITRATE: 20 CREAM TOPICAL at 18:24

## 2024-12-01 RX ADMIN — LEVALBUTEROL HYDROCHLORIDE 1.25 MG: 1.25 SOLUTION RESPIRATORY (INHALATION) at 19:10

## 2024-12-01 RX ADMIN — LEVALBUTEROL HYDROCHLORIDE 1.25 MG: 1.25 SOLUTION RESPIRATORY (INHALATION) at 13:08

## 2024-12-01 RX ADMIN — VALPROATE SODIUM 500 MG: 100 INJECTION, SOLUTION INTRAVENOUS at 04:29

## 2024-12-01 RX ADMIN — VALPROATE SODIUM 500 MG: 100 INJECTION, SOLUTION INTRAVENOUS at 10:00

## 2024-12-01 RX ADMIN — OXYCODONE HYDROCHLORIDE 5 MG: 5 TABLET ORAL at 05:39

## 2024-12-01 RX ADMIN — PANTOPRAZOLE SODIUM 40 MG: 40 INJECTION, POWDER, FOR SOLUTION INTRAVENOUS at 20:38

## 2024-12-01 RX ADMIN — SERTRALINE HYDROCHLORIDE 50 MG: 50 TABLET ORAL at 09:51

## 2024-12-01 RX ADMIN — VALPROATE SODIUM 500 MG: 100 INJECTION, SOLUTION INTRAVENOUS at 21:53

## 2024-12-01 RX ADMIN — CHLORHEXIDINE GLUCONATE 0.12% ORAL RINSE 15 ML: 1.2 LIQUID ORAL at 09:49

## 2024-12-01 RX ADMIN — LEVETIRACETAM 750 MG: 750 TABLET, FILM COATED ORAL at 09:49

## 2024-12-01 RX ADMIN — ALBUMIN (HUMAN) 25 G: 12.5 INJECTION, SOLUTION INTRAVENOUS at 09:56

## 2024-12-01 RX ADMIN — LEVOTHYROXINE SODIUM 100 MCG: 100 TABLET ORAL at 05:29

## 2024-12-01 RX ADMIN — THIAMINE HCL TAB 100 MG 100 MG: 100 TAB at 09:49

## 2024-12-01 RX ADMIN — ATORVASTATIN CALCIUM 10 MG: 10 TABLET, FILM COATED ORAL at 18:24

## 2024-12-01 RX ADMIN — POTASSIUM CHLORIDE 20 MEQ: 14.9 INJECTION, SOLUTION INTRAVENOUS at 09:48

## 2024-12-01 RX ADMIN — SODIUM CHLORIDE SOLN NEBU 3% 4 ML: 3 NEBU SOLN at 13:08

## 2024-12-01 RX ADMIN — VALPROATE SODIUM 500 MG: 100 INJECTION, SOLUTION INTRAVENOUS at 18:24

## 2024-12-01 RX ADMIN — PANTOPRAZOLE SODIUM 40 MG: 40 INJECTION, POWDER, FOR SOLUTION INTRAVENOUS at 09:49

## 2024-12-01 RX ADMIN — SODIUM CHLORIDE SOLN NEBU 3% 4 ML: 3 NEBU SOLN at 19:10

## 2024-12-01 RX ADMIN — LEVETIRACETAM 750 MG: 750 TABLET, FILM COATED ORAL at 20:38

## 2024-12-01 NOTE — PROGRESS NOTES
Progress Note - Critical Care/ICU   Name: Adelina Soto 64 y.o. 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  Pain control with as needed oxy and Tylenol     Diagnosis: Depression   Plan:  Continue home Zoloft     CV:   Diagnosis: Shock septic versus hemorrhagic- resolved, hypotension  Plan:  MAP greater than 65  Remains off pressors  Consider IV fluid and encourage oral intake      Diagnosis: Afib on Warfarin  Plan:  Holding Lopressor 50 mg PO TID due to hypotension; however rate relatively controlled < 110bpm  AC with Lovenox 1 mg/kg BID  Consider amiodarone for rate control if persistently > 110 bpm     Pulm:  Diagnosis: Acute hypoxic respiratory failure  11/12 intubated on arrival for airway protection due to AMS  11/12 bronchoscopy with generalized edematous friable mucosa and moderate thick purulent secretions, culture with mixed respiratory viktoriya  11/20 repeat bronchoscopy generally unremarkable  11/30 extubated to high flow nasal cannula     GI:   Diagnosis: GERD  Plan:  Continue home pepcid     Diagnosis: Rectal bleeding  11/26 noted to have some rectal bleeding with a blood clot near rectal tube site   History of internal hemorrhoids, suspect this was likely related to trauma from rectal tube insertion or manipulation  Removed, continue to monitor for any persistent clots/bleeding     :   Diagnosis: CKD  Baseline  Cr 1.4-1.8  Remains baseline throughout hospital stay  Monitor renal fx, monitor I/O's, avoid nephrotoxins  Trial Stringer removal     F/E/N:   F - none  E - K > 4, Mg > 2, Phos > 3  N -dysphagia 1 puréed diet with thin liquids     Heme/Onc:   Diagnosis: Anemia  Hgb 9-10 this admission  11/28 transfused 1u pRBC's due to hemoglobin 6.7, found to have small hemmorhage in the left paraspinal muscle  Consider resuming anticoagulation with heparin if hemoglobin stable     Endo:   Diagnosis: Hypothyroidism  TSH 0.29, T4 1.23  Continue home levothyroxine     Diagnosis: T2DM  Diet controlled, not on medications outpatient  Monitor BG for goal 140-180  Add SSI if needed     ID:   Diagnosis: HSV encephalitis  Initial MRI brain w/ c/f HSV encephalitis based on left temporal enhancement  LP and ME panel neg, unfortunately unable to obtain second CSF analyses for confirmation  Status post 14-day course of acyclovir  Repeat infectious w/u if repeatedly elevated temp  Continue to monitor off antivirals     Diagnosis: UTI, possible pneumonia  Urine culture growing E. coli and ESBL proteus  Earlier bronchoscopy with copious purulent secretions, however culture with mixed respiratory viktoriya  Not treated earlier in admission given lack of fever, improvement in leukocytosis     MSK/Skin:   Diagnosis: Pressure wounds  11/28 CT A/P notable for small bleed in the left posterior paraspinal musculature ~L4-L5; no retroperitoneal hemmorhage, hgb has since been stable after 1u pRBC's  11/29 noted to have left medial thigh erythema/fluctuance concerning for abscess, underwent I&D with serosanguineous drainage     Diagnosis: Pressure wounds  Continue wound care    Disposition: Critical care    ICU Core Measures     A: Assess, Prevent, and Manage Pain Has pain been assessed? Yes  Need for changes to pain regimen? No   B: Both SAT/SAT  N/A   C: Choice of Sedation RASS Goal: 0 Alert and Calm  Need for changes to sedation or analgesia regimen? No   D:  Delirium CAM-ICU: Negative   E: Early Mobility  Plan for early mobility? Yes   F: Family Engagement Plan for family engagement today? Yes       Review of Invasive Devices:    Stringer Plan: Continue for accurate I/O monitoring for 48 hours        Prophylaxis:  VTE VTE covered by:    None       Stress Ulcer  covered byfamotidine (PEPCID) 20 mg tablet [[id number]] (Long-Term Med), pantoprazole (PROTONIX) 40 mg tablet [[id number]] (Long-Term Med), pantoprazole (PROTONIX) injection 40 mg [[id number]]         24 Hour Events : Yesterday patient extubated to mid flow nasal cannula now saturating well on 9 L MFNC  Subjective   Review of Systems: Review of Systems   Constitutional:  Negative for chills and fever.   HENT:  Negative for ear pain and sore throat.    Eyes:  Negative for pain and visual disturbance.   Respiratory:  Positive for cough. Negative for shortness of breath.    Cardiovascular:  Negative for chest pain and palpitations.   Gastrointestinal:  Negative for abdominal pain and vomiting.   Genitourinary:  Negative for dysuria and hematuria.   Musculoskeletal:  Negative for arthralgias and back pain.   Skin:  Negative for color change and rash.   Neurological:  Negative for seizures and syncope.   All other systems reviewed and are negative.      Objective :                   Vitals I/O      Most Recent Min/Max in 24hrs   Temp 97.5 °F (36.4 °C) Temp  Min: 97.4 °F (36.3 °C)  Max: 97.7 °F (36.5 °C)   Pulse 90 Pulse  Min: 86  Max: 111   Resp 15 Resp  Min: 15  Max: 32   BP 92/50 BP  Min: 86/53  Max: 130/67   O2 Sat 96 % SpO2  Min: 92 %  Max: 100 %      Intake/Output Summary (Last 24 hours) at 12/1/2024 0613  Last data filed at 12/1/2024 0000  Gross per 24 hour   Intake 170 ml   Output 755 ml   Net -585 ml       Diet Dysphagia/Modified Consistency; Dysphagia 1-Pureed; Thin Liquid    Invasive Monitoring           Physical Exam   Physical Exam  Vitals reviewed.   Eyes:      Pupils: Pupils are equal, round, and reactive to  light.   Skin:     General: Skin is warm and dry.   HENT:      Head: Normocephalic.      Mouth/Throat:      Mouth: Mucous membranes are dry.   Cardiovascular:      Rate and Rhythm: Normal rate. Rhythm irregular.      Heart sounds: Normal heart sounds.   Abdominal:      Palpations: Abdomen is soft.      Tenderness: There is no abdominal tenderness.   Constitutional:       General: She is not in acute distress.     Appearance: She is not ill-appearing.   Pulmonary:      Effort: Pulmonary effort is normal. No respiratory distress.      Breath sounds: Normal breath sounds.   Neurological:      Mental Status: She is alert. She is calm.      Comments: AAOx4.  No focal deficits   Genitourinary/Anorectal:  Stringer present.        Diagnostic Studies        Lab Results: I have reviewed the following results:     Medications:  Scheduled PRN   atorvastatin, 10 mg, Daily With Dinner  chlorhexidine, 15 mL, Q12H LINO  levalbuterol, 1.25 mg, TID  levETIRAcetam, 750 mg, Q12H LINO  levothyroxine, 100 mcg, Early Morning  lidocaine, 2 patch, Daily  EARLINE ANTIFUNGAL, , BID  pantoprazole, 40 mg, Q12H LINO  sertraline, 50 mg, Daily  sodium chloride, 4 mL, TID  thiamine, 100 mg, Daily  valproate sodium, 500 mg, Q6H LINO      acetaminophen, 650 mg, Q4H PRN  albuterol, 2.5 mg, Q6H PRN  ondansetron, 4 mg, Q4H PRN  oxyCODONE, 5 mg, Q6H PRN  oxyCODONE, 2.5 mg, Q6H PRN       Continuous          Labs:   CBC    Recent Labs     11/30/24  0547 12/01/24  0541   WBC 5.76 5.81   HGB 8.2* 8.1*   HCT 28.0* 28.4*    394*     BMP    Recent Labs     11/30/24  0547   SODIUM 139   K 5.0      CO2 30   AGAP 8   BUN 32*   CREATININE 1.24   CALCIUM 8.9       Coags    No recent results     Additional Electrolytes  Recent Labs     11/30/24  0547   MG 2.3   PHOS 4.6*          Blood Gas    Recent Labs     11/30/24  0923   PHART 7.402   CEI9NEL 50.0*   PO2ART 67.9*   [id number] 30.4*   BEART 4.9   SOURCE Line, Arterial     Recent Labs     11/30/24  0923   SOURCE  Line, Arterial    LFTs  No recent results    Infectious  No recent results  Glucose  Recent Labs     11/30/24  0547   GLUC 127

## 2024-12-02 ENCOUNTER — APPOINTMENT (INPATIENT)
Dept: RADIOLOGY | Facility: HOSPITAL | Age: 64
DRG: 097 | End: 2024-12-02
Payer: COMMERCIAL

## 2024-12-02 PROBLEM — M25.452: Status: ACTIVE | Noted: 2024-12-02

## 2024-12-02 PROBLEM — I95.9 HYPOTENSION: Status: ACTIVE | Noted: 2024-12-02

## 2024-12-02 LAB
ANION GAP SERPL CALCULATED.3IONS-SCNC: 5 MMOL/L (ref 4–13)
ANISOCYTOSIS BLD QL SMEAR: PRESENT
APTT PPP: 106 SECONDS (ref 23–34)
APTT PPP: 68 SECONDS (ref 23–34)
APTT PPP: 86 SECONDS (ref 23–34)
BASOPHILS # BLD MANUAL: 0 THOUSAND/UL (ref 0–0.1)
BASOPHILS NFR MAR MANUAL: 0 % (ref 0–1)
BUN SERPL-MCNC: 35 MG/DL (ref 5–25)
CALCIUM SERPL-MCNC: 8.7 MG/DL (ref 8.4–10.2)
CHLORIDE SERPL-SCNC: 106 MMOL/L (ref 96–108)
CO2 SERPL-SCNC: 32 MMOL/L (ref 21–32)
CREAT SERPL-MCNC: 1.3 MG/DL (ref 0.6–1.3)
EOSINOPHIL # BLD MANUAL: 0.1 THOUSAND/UL (ref 0–0.4)
EOSINOPHIL NFR BLD MANUAL: 2 % (ref 0–6)
ERYTHROCYTE [DISTWIDTH] IN BLOOD BY AUTOMATED COUNT: 21 % (ref 11.6–15.1)
GFR SERPL CREATININE-BSD FRML MDRD: 43 ML/MIN/1.73SQ M
GLUCOSE SERPL-MCNC: 77 MG/DL (ref 65–140)
GLUCOSE SERPL-MCNC: 84 MG/DL (ref 65–140)
GLUCOSE SERPL-MCNC: 91 MG/DL (ref 65–140)
HCT VFR BLD AUTO: 27.3 % (ref 34.8–46.1)
HGB BLD-MCNC: 7.8 G/DL (ref 11.5–15.4)
LYMPHOCYTES # BLD AUTO: 1.49 THOUSAND/UL (ref 0.6–4.47)
LYMPHOCYTES # BLD AUTO: 25 % (ref 14–44)
MACROCYTES BLD QL AUTO: PRESENT
MAGNESIUM SERPL-MCNC: 2.1 MG/DL (ref 1.9–2.7)
MCH RBC QN AUTO: 33.1 PG (ref 26.8–34.3)
MCHC RBC AUTO-ENTMCNC: 28.6 G/DL (ref 31.4–37.4)
MCV RBC AUTO: 116 FL (ref 82–98)
MONOCYTES # BLD AUTO: 0.53 THOUSAND/UL (ref 0–1.22)
MONOCYTES NFR BLD: 11 % (ref 4–12)
NEUTROPHILS # BLD MANUAL: 2.69 THOUSAND/UL (ref 1.85–7.62)
NEUTS BAND NFR BLD MANUAL: 4 % (ref 0–8)
NEUTS SEG NFR BLD AUTO: 52 % (ref 43–75)
NRBC BLD AUTO-RTO: 2 /100 WBC (ref 0–2)
PHOSPHATE SERPL-MCNC: 4.1 MG/DL (ref 2.3–4.1)
PLASMA CELLS NFR BLD: 2 % (ref 0–0)
PLATELET # BLD AUTO: 355 THOUSANDS/UL (ref 149–390)
PLATELET BLD QL SMEAR: ADEQUATE
PMV BLD AUTO: 9.3 FL (ref 8.9–12.7)
POLYCHROMASIA BLD QL SMEAR: PRESENT
POTASSIUM SERPL-SCNC: 4.2 MMOL/L (ref 3.5–5.3)
RBC # BLD AUTO: 2.36 MILLION/UL (ref 3.81–5.12)
RBC MORPH BLD: PRESENT
SODIUM SERPL-SCNC: 143 MMOL/L (ref 135–147)
VARIANT LYMPHS # BLD AUTO: 4 %
WBC # BLD AUTO: 4.81 THOUSAND/UL (ref 4.31–10.16)

## 2024-12-02 PROCEDURE — 84100 ASSAY OF PHOSPHORUS: CPT

## 2024-12-02 PROCEDURE — 94669 MECHANICAL CHEST WALL OSCILL: CPT

## 2024-12-02 PROCEDURE — 85730 THROMBOPLASTIN TIME PARTIAL: CPT | Performed by: INTERNAL MEDICINE

## 2024-12-02 PROCEDURE — 85730 THROMBOPLASTIN TIME PARTIAL: CPT | Performed by: STUDENT IN AN ORGANIZED HEALTH CARE EDUCATION/TRAINING PROGRAM

## 2024-12-02 PROCEDURE — 94760 N-INVAS EAR/PLS OXIMETRY 1: CPT

## 2024-12-02 PROCEDURE — 73701 CT LOWER EXTREMITY W/DYE: CPT

## 2024-12-02 PROCEDURE — NC001 PR NO CHARGE: Performed by: STUDENT IN AN ORGANIZED HEALTH CARE EDUCATION/TRAINING PROGRAM

## 2024-12-02 PROCEDURE — 99233 SBSQ HOSP IP/OBS HIGH 50: CPT | Performed by: INTERNAL MEDICINE

## 2024-12-02 PROCEDURE — 92526 ORAL FUNCTION THERAPY: CPT

## 2024-12-02 PROCEDURE — 87040 BLOOD CULTURE FOR BACTERIA: CPT

## 2024-12-02 PROCEDURE — 99291 CRITICAL CARE FIRST HOUR: CPT | Performed by: STUDENT IN AN ORGANIZED HEALTH CARE EDUCATION/TRAINING PROGRAM

## 2024-12-02 PROCEDURE — 94664 DEMO&/EVAL PT USE INHALER: CPT

## 2024-12-02 PROCEDURE — 85007 BL SMEAR W/DIFF WBC COUNT: CPT

## 2024-12-02 PROCEDURE — 94640 AIRWAY INHALATION TREATMENT: CPT

## 2024-12-02 PROCEDURE — 83735 ASSAY OF MAGNESIUM: CPT

## 2024-12-02 PROCEDURE — 97167 OT EVAL HIGH COMPLEX 60 MIN: CPT

## 2024-12-02 PROCEDURE — 80048 BASIC METABOLIC PNL TOTAL CA: CPT

## 2024-12-02 PROCEDURE — 74177 CT ABD & PELVIS W/CONTRAST: CPT

## 2024-12-02 PROCEDURE — 85027 COMPLETE CBC AUTOMATED: CPT

## 2024-12-02 PROCEDURE — 82948 REAGENT STRIP/BLOOD GLUCOSE: CPT

## 2024-12-02 PROCEDURE — 97163 PT EVAL HIGH COMPLEX 45 MIN: CPT

## 2024-12-02 RX ORDER — VALPROIC ACID 250 MG/5ML
500 SOLUTION ORAL EVERY 6 HOURS SCHEDULED
Status: DISCONTINUED | OUTPATIENT
Start: 2024-12-02 | End: 2024-12-09 | Stop reason: HOSPADM

## 2024-12-02 RX ORDER — NOREPINEPHRINE BITARTRATE 1 MG/ML
INJECTION, SOLUTION INTRAVENOUS
Status: DISPENSED
Start: 2024-12-02 | End: 2024-12-02

## 2024-12-02 RX ORDER — VANCOMYCIN HYDROCHLORIDE 1 G/200ML
1000 INJECTION, SOLUTION INTRAVENOUS EVERY 24 HOURS
Status: DISCONTINUED | OUTPATIENT
Start: 2024-12-02 | End: 2024-12-03 | Stop reason: ALTCHOICE

## 2024-12-02 RX ORDER — SODIUM CHLORIDE, SODIUM GLUCONATE, SODIUM ACETATE, POTASSIUM CHLORIDE, MAGNESIUM CHLORIDE, SODIUM PHOSPHATE, DIBASIC, AND POTASSIUM PHOSPHATE .53; .5; .37; .037; .03; .012; .00082 G/100ML; G/100ML; G/100ML; G/100ML; G/100ML; G/100ML; G/100ML
500 INJECTION, SOLUTION INTRAVENOUS ONCE
Status: COMPLETED | OUTPATIENT
Start: 2024-12-02 | End: 2024-12-02

## 2024-12-02 RX ADMIN — SODIUM CHLORIDE SOLN NEBU 3% 4 ML: 3 NEBU SOLN at 07:21

## 2024-12-02 RX ADMIN — LEVALBUTEROL HYDROCHLORIDE 1.25 MG: 1.25 SOLUTION RESPIRATORY (INHALATION) at 19:39

## 2024-12-02 RX ADMIN — SODIUM CHLORIDE SOLN NEBU 3% 4 ML: 3 NEBU SOLN at 13:33

## 2024-12-02 RX ADMIN — VALPROATE SODIUM 500 MG: 100 INJECTION, SOLUTION INTRAVENOUS at 09:24

## 2024-12-02 RX ADMIN — VALPROATE SODIUM 500 MG: 100 INJECTION, SOLUTION INTRAVENOUS at 03:47

## 2024-12-02 RX ADMIN — VANCOMYCIN HYDROCHLORIDE 1000 MG: 1 INJECTION, SOLUTION INTRAVENOUS at 15:46

## 2024-12-02 RX ADMIN — CHLORHEXIDINE GLUCONATE 0.12% ORAL RINSE 15 ML: 1.2 LIQUID ORAL at 09:19

## 2024-12-02 RX ADMIN — PANTOPRAZOLE SODIUM 40 MG: 40 INJECTION, POWDER, FOR SOLUTION INTRAVENOUS at 21:19

## 2024-12-02 RX ADMIN — HEPARIN SODIUM 13 UNITS/KG/HR: 10000 INJECTION, SOLUTION INTRAVENOUS at 14:08

## 2024-12-02 RX ADMIN — LEVALBUTEROL HYDROCHLORIDE 1.25 MG: 1.25 SOLUTION RESPIRATORY (INHALATION) at 13:33

## 2024-12-02 RX ADMIN — LEVALBUTEROL HYDROCHLORIDE 1.25 MG: 1.25 SOLUTION RESPIRATORY (INHALATION) at 07:21

## 2024-12-02 RX ADMIN — ATORVASTATIN CALCIUM 10 MG: 10 TABLET, FILM COATED ORAL at 17:53

## 2024-12-02 RX ADMIN — SERTRALINE HYDROCHLORIDE 50 MG: 50 TABLET ORAL at 09:20

## 2024-12-02 RX ADMIN — IOHEXOL 100 ML: 350 INJECTION, SOLUTION INTRAVENOUS at 16:46

## 2024-12-02 RX ADMIN — MICONAZOLE NITRATE: 20 CREAM TOPICAL at 09:19

## 2024-12-02 RX ADMIN — PANTOPRAZOLE SODIUM 40 MG: 40 INJECTION, POWDER, FOR SOLUTION INTRAVENOUS at 09:19

## 2024-12-02 RX ADMIN — VALPROIC ACID 500 MG: 500 SOLUTION ORAL at 12:16

## 2024-12-02 RX ADMIN — LEVETIRACETAM 750 MG: 750 TABLET, FILM COATED ORAL at 09:19

## 2024-12-02 RX ADMIN — LEVETIRACETAM 750 MG: 750 TABLET, FILM COATED ORAL at 21:19

## 2024-12-02 RX ADMIN — THIAMINE HCL TAB 100 MG 100 MG: 100 TAB at 09:19

## 2024-12-02 RX ADMIN — LEVOTHYROXINE SODIUM 100 MCG: 100 TABLET ORAL at 05:46

## 2024-12-02 RX ADMIN — SODIUM CHLORIDE, SODIUM GLUCONATE, SODIUM ACETATE, POTASSIUM CHLORIDE, MAGNESIUM CHLORIDE, SODIUM PHOSPHATE, DIBASIC, AND POTASSIUM PHOSPHATE 500 ML: .53; .5; .37; .037; .03; .012; .00082 INJECTION, SOLUTION INTRAVENOUS at 11:00

## 2024-12-02 NOTE — PHYSICAL THERAPY NOTE
PHYSICAL THERAPY NOTE          Patient Name: Adelina Soto  Today's Date: 12/2/2024 12/02/24 1146   PT Last Visit   PT Visit Date 12/02/24   Note Type   Note type Evaluation   Pain Assessment   Pain Assessment Tool 0-10   Pain Score No Pain   Restrictions/Precautions   Weight Bearing Precautions Per Order No   Other Precautions Chair Alarm;Bed Alarm;Multiple lines;O2;Fall Risk   Home Living   Type of Home House   Home Layout One level   Bathroom Shower/Tub Walk-in shower   Bathroom Toilet Raised   Bathroom Equipment Grab bars in shower;Shower chair   Home Equipment Wheelchair-electric   Prior Function   Level of [place] Needs assistance with ADLs;Modified independent with wheelchair;Needs assistance with IADLS   Lives With Significant other   Receives Help From Family;Personal care attendant   IADLs Family/Friend/Other provides transportation;Family/Friend/Other provides meals;Family/Friend/Other provides medication management   Falls in the last 6 months 0   Vocational Unemployed   Cognition   Overall Cognitive Status WFL   Orientation Level Oriented X4   Subjective   Subjective Pt agreeable to participation in therapy   RLE Assessment   RLE Assessment X  (Grossly decreased strength, 2+/5)   LLE Assessment   LLE Assessment X  (Grossly decreased strength, 2+/5)   Coordination   Movements are Fluid and Coordinated 0   Coordination and Movement Description movements not coordinated/fluid   Bed Mobility   Supine to Sit 2  Maximal assistance   Additional items Assist x 2   Sit to Supine 2  Maximal assistance   Additional items Assist x 3   Transfers   Sit to Stand Unable to assess   Stand to Sit Unable to assess   Additional Comments Max assist of 2 to perform lateral scoots up the bed as unable to obtain full clearance of buttocks needed for sit to stand   Ambulation/Elevation   Gait pattern Not appropriate   Balance    Static Sitting Fair -   Dynamic Sitting Poor +   Static Standing Zero   Endurance Deficit   Endurance Deficit Yes   Endurance Deficit Description pt limited by muscular endurance   Activity Tolerance   Activity Tolerance Patient limited by fatigue   Medical Staff Made Aware OT present for co-evaluation   Nurse Made Aware Nursing cleared pt for therapy   Assessment   Prognosis Fair   Problem List Decreased strength;Decreased range of motion;Decreased endurance;Impaired balance;Decreased mobility;Decreased coordination   Assessment Pt is a 64 y.o. female who was admitted to [facility] with seizure. Pt  has a past medical history of Anemia, Arthritis, Cancer of kidney (HCC), Chronic kidney disease, Diabetes mellitus (HCC), Disease of thyroid gland, HLD (hyperlipidemia), Hypertension, Ovarian cancer (HCC), and Pneumonia.. Pt is alert and oriented, able to answer questions about home setup and prior level of function. Prior to admission, pt reports needing assistance with ADLs and IADLs. Currently pt is presenting as Max assist of 2 for bed mobility, transfers/scoots. Pt would benefit from continued therapy to address deficits in strength, endurance, and balance as well as trialing extended ambulation and stair negotiation. At this time PT is recommending level 2 resources to maximize function.   Goals   Patient Goals to go home   STG Expiration Date 12/16/24   Short Term Goal #1 1) Pt will perform all bed mobility and transfers with supervision to increase independence and ability to participate in ADLs, 2) pt will be able to navigate 250' or more with wheelchair to increase ability to ambulate in community, 3) Pt to increase core strength to allow pt to better tolerate sitting edge of bed and decrease need of assistance, 4) Pt will improve balance to fair+ or better to improve patient safety and decrease risk of future falls, 5)PT to see for gait/stair when appropriate   Plan   Treatment/Interventions Functional transfer  training;Endurance training;Bed mobility;Spoke to nursing;OT   PT Frequency 2-3x/wk   Discharge Recommendation   Rehab Resource Intensity Level, PT II (Moderate Resource Intensity)   AM-PAC Basic Mobility Inpatient   Turning in Flat Bed Without Bedrails 2   Lying on Back to Sitting on Edge of Flat Bed Without Bedrails 2   Moving Bed to Chair 1   Standing Up From Chair Using Arms 1   Walk in Room 1   Climb 3-5 Stairs With Railing 1   Basic Mobility Inpatient Raw Score 8   [facility] Highest Level Of Mobility   -HLM Goal 3: Sit at edge of bed   -HL Achieved 3: Sit at edge of bed   End of Consult   Patient Position at End of Consult Supine;Bed/Chair alarm activated;All needs within reach   Shivam Ngo, SPT

## 2024-12-02 NOTE — ASSESSMENT & PLAN NOTE
Lab Results   Component Value Date    EGFR 43 12/02/2024    EGFR 45 12/01/2024    EGFR 45 11/30/2024    CREATININE 1.30 12/02/2024    CREATININE 1.25 12/01/2024    CREATININE 1.24 11/30/2024   Baseline creatinine 1.7-1.9.  Creatinine stable, slightly below baseline.  Dose adjust antibiotics as needed for creatinine clearance.  Monitor creatinine daily

## 2024-12-02 NOTE — PLAN OF CARE
Problem: PAIN - ADULT  Goal: Verbalizes/displays adequate comfort level or baseline comfort level  Description: Interventions:  - Encourage patient to monitor pain and request assistance  - Assess pain using appropriate pain scale  - Administer analgesics based on type and severity of pain and evaluate response  - Implement non-pharmacological measures as appropriate and evaluate response  - Consider cultural and social influences on pain and pain management  - Notify physician/advanced practitioner if interventions unsuccessful or patient reports new pain  Outcome: Progressing     Problem: INFECTION - ADULT  Goal: Absence or prevention of progression during hospitalization  Description: INTERVENTIONS:  - Assess and monitor for signs and symptoms of infection  - Monitor lab/diagnostic results  - Monitor all insertion sites, i.e. indwelling lines, tubes, and drains  - Monitor endotracheal if appropriate and nasal secretions for changes in amount and color  - Vernon appropriate cooling/warming therapies per order  - Administer medications as ordered  - Instruct and encourage patient and family to use good hand hygiene technique  - Identify and instruct in appropriate isolation precautions for identified infection/condition  Outcome: Progressing  Goal: Absence of fever/infection during neutropenic period  Description: INTERVENTIONS:  - Monitor WBC    Outcome: Progressing     Problem: SAFETY ADULT  Goal: Patient will remain free of falls  Description: INTERVENTIONS:  - Educate patient/family on patient safety including physical limitations  - Instruct patient to call for assistance with activity   - Consult OT/PT to assist with strengthening/mobility   - Keep Call bell within reach  - Keep bed low and locked with side rails adjusted as appropriate  - Keep care items and personal belongings within reach  - Initiate and maintain comfort rounds  - Make Fall Risk Sign visible to staff  - Apply yellow socks and bracelet  for high fall risk patients  - Consider moving patient to room near nurses station  Outcome: Progressing  Goal: Maintain or return to baseline ADL function  Description: INTERVENTIONS:  -  Assess patient's ability to carry out ADLs; assess patient's baseline for ADL function and identify physical deficits which impact ability to perform ADLs (bathing, care of mouth/teeth, toileting, grooming, dressing, etc.)  - Assess/evaluate cause of self-care deficits   - Assess range of motion  - Assess patient's mobility; develop plan if impaired  - Assess patient's need for assistive devices and provide as appropriate  - Encourage maximum independence but intervene and supervise when necessary  - Involve family in performance of ADLs  - Assess for home care needs following discharge   - Consider OT consult to assist with ADL evaluation and planning for discharge  - Provide patient education as appropriate  Outcome: Progressing  Goal: Maintains/Returns to pre admission functional level  Description: INTERVENTIONS:  - Perform AM-PAC 6 Click Basic Mobility/ Daily Activity assessment daily.  - Set and communicate daily mobility goal to care team and patient/family/caregiver.   - Collaborate with rehabilitation services on mobility goals if consulted  - Out of bed for toileting  - Record patient progress and toleration of activity level   Outcome: Progressing     Problem: DISCHARGE PLANNING  Goal: Discharge to home or other facility with appropriate resources  Description: INTERVENTIONS:  - Identify barriers to discharge w/patient and caregiver  - Arrange for needed discharge resources and transportation as appropriate  - Identify discharge learning needs (meds, wound care, etc.)  - Arrange for interpretive services to assist at discharge as needed  - Refer to Case Management Department for coordinating discharge planning if the patient needs post-hospital services based on physician/advanced practitioner order or complex needs  related to functional status, cognitive ability, or social support system  Outcome: Progressing     Problem: Knowledge Deficit  Goal: Patient/family/caregiver demonstrates understanding of disease process, treatment plan, medications, and discharge instructions  Description: Complete learning assessment and assess knowledge base.  Interventions:  - Provide teaching at level of understanding  - Provide teaching via preferred learning methods  Outcome: Progressing     Problem: NEUROSENSORY - ADULT  Goal: Achieves stable or improved neurological status  Description: INTERVENTIONS  - Monitor and report changes in neurological status  - Monitor vital signs such as temperature, blood pressure, glucose, and any other labs ordered   - Initiate measures to prevent increased intracranial pressure  - Monitor for seizure activity and implement precautions if appropriate      Outcome: Progressing  Goal: Remains free of injury related to seizures activity  Description: INTERVENTIONS  - Maintain airway, patient safety  and administer oxygen as ordered  - Monitor patient for seizure activity, document and report duration and description of seizure to physician/advanced practitioner  - If seizure occurs,  ensure patient safety during seizure  - Reorient patient post seizure  - Seizure pads on all 4 side rails  - Instruct patient/family to notify RN of any seizure activity including if an aura is experienced  - Instruct patient/family to call for assistance with activity based on nursing assessment  - Administer anti-seizure medications if ordered    Outcome: Progressing  Goal: Achieves maximal functionality and self care  Description: INTERVENTIONS  - Monitor swallowing and airway patency with patient fatigue and changes in neurological status  - Encourage and assist patient to increase activity and self care.   - Encourage visually impaired, hearing impaired and aphasic patients to use assistive/communication devices  Outcome:  Progressing     Problem: CARDIOVASCULAR - ADULT  Goal: Maintains optimal cardiac output and hemodynamic stability  Description: INTERVENTIONS:  - Monitor I/O, vital signs and rhythm  - Monitor for S/S and trends of decreased cardiac output  - Administer and titrate ordered vasoactive medications to optimize hemodynamic stability  - Assess quality of pulses, skin color and temperature  - Assess for signs of decreased coronary artery perfusion  - Instruct patient to report change in severity of symptoms  Outcome: Progressing  Goal: Absence of cardiac dysrhythmias or at baseline rhythm  Description: INTERVENTIONS:  - Continuous cardiac monitoring, vital signs, obtain 12 lead EKG if ordered  - Administer antiarrhythmic and heart rate control medications as ordered  - Monitor electrolytes and administer replacement therapy as ordered  Outcome: Progressing     Problem: RESPIRATORY - ADULT  Goal: Achieves optimal ventilation and oxygenation  Description: INTERVENTIONS:  - Assess for changes in respiratory status  - Assess for changes in mentation and behavior  - Position to facilitate oxygenation and minimize respiratory effort  - Oxygen administered by appropriate delivery if ordered  - Initiate smoking cessation education as indicated  - Encourage broncho-pulmonary hygiene including cough, deep breathe, Incentive Spirometry  - Assess the need for suctioning and aspirate as needed  - Assess and instruct to report SOB or any respiratory difficulty  - Respiratory Therapy support as indicated  Outcome: Progressing     Problem: GASTROINTESTINAL - ADULT  Goal: Minimal or absence of nausea and/or vomiting  Description: INTERVENTIONS:  - Administer IV fluids if ordered to ensure adequate hydration  - Maintain NPO status until nausea and vomiting are resolved  - Nasogastric tube if ordered  - Administer ordered antiemetic medications as needed  - Provide nonpharmacologic comfort measures as appropriate  - Advance diet as  tolerated, if ordered  - Consider nutrition services referral to assist patient with adequate nutrition and appropriate food choices  Outcome: Progressing  Goal: Maintains or returns to baseline bowel function  Description: INTERVENTIONS:  - Assess bowel function  - Encourage oral fluids to ensure adequate hydration  - Administer IV fluids if ordered to ensure adequate hydration  - Administer ordered medications as needed  - Encourage mobilization and activity  - Consider nutritional services referral to assist patient with adequate nutrition and appropriate food choices  Outcome: Progressing  Goal: Maintains adequate nutritional intake  Description: INTERVENTIONS:  - Monitor percentage of each meal consumed  - Identify factors contributing to decreased intake, treat as appropriate  - Assist with meals as needed  - Monitor I&O, weight, and lab values if indicated  - Obtain nutrition services referral as needed  Outcome: Progressing  Goal: Establish and maintain optimal ostomy function  Description: INTERVENTIONS:  - Assess bowel function  - Encourage oral fluids to ensure adequate hydration  - Administer IV fluids if ordered to ensure adequate hydration   - Administer ordered medications as needed  - Encourage mobilization and activity  - Nutrition services referral to assist patient with appropriate food choices  - Assess stoma site  - Consider wound care consult   Outcome: Progressing  Goal: Oral mucous membranes remain intact  Description: INTERVENTIONS  - Assess oral mucosa and hygiene practices  - Implement preventative oral hygiene regimen  - Implement oral medicated treatments as ordered  - Initiate Nutrition services referral as needed  Outcome: Progressing     Problem: GENITOURINARY - ADULT  Goal: Maintains or returns to baseline urinary function  Description: INTERVENTIONS:  - Assess urinary function  - Encourage oral fluids to ensure adequate hydration if ordered  - Administer IV fluids as ordered to  ensure adequate hydration  - Administer ordered medications as needed  - Offer frequent toileting  - Follow urinary retention protocol if ordered  Outcome: Progressing  Goal: Absence of urinary retention  Description: INTERVENTIONS:  - Assess patient’s ability to void and empty bladder  - Monitor I/O  - Bladder scan as needed  - Discuss with physician/AP medications to alleviate retention as needed  - Discuss catheterization for long term situations as appropriate  Outcome: Progressing  Goal: Urinary catheter remains patent  Description: INTERVENTIONS:  - Assess patency of urinary catheter  - If patient has a chronic peace, consider changing catheter if non-functioning  - Follow guidelines for intermittent irrigation of non-functioning urinary catheter  Outcome: Progressing     Problem: METABOLIC, FLUID AND ELECTROLYTES - ADULT  Goal: Electrolytes maintained within normal limits  Description: INTERVENTIONS:  - Monitor labs and assess patient for signs and symptoms of electrolyte imbalances  - Administer electrolyte replacement as ordered  - Monitor response to electrolyte replacements, including repeat lab results as appropriate  - Instruct patient on fluid and nutrition as appropriate  Outcome: Progressing  Goal: Fluid balance maintained  Description: INTERVENTIONS:  - Monitor labs   - Monitor I/O and WT  - Instruct patient on fluid and nutrition as appropriate  - Assess for signs & symptoms of volume excess or deficit  Outcome: Progressing  Goal: Glucose maintained within target range  Description: INTERVENTIONS:  - Monitor Blood Glucose as ordered  - Assess for signs and symptoms of hyperglycemia and hypoglycemia  - Administer ordered medications to maintain glucose within target range  - Assess nutritional intake and initiate nutrition service referral as needed  Outcome: Progressing     Problem: SKIN/TISSUE INTEGRITY - ADULT  Goal: Skin Integrity remains intact(Skin Breakdown Prevention)  Description:  Assess:  -Inspect skin when repositioning, toileting, and assisting with ADLS  -Assess extremities for adequate circulation and sensation     Bed Management:  -Have minimal linens on bed & keep smooth, unwrinkled  -Change linens as needed when moist or perspiring    Toileting:  -Offer bedside commode    Activity:  -Encourage activity and walks on unit  -Encourage or provide ROM exercises   -Use appropriate equipment to lift or move patient in bed    Skin Care:  -Avoid use of baby powder, tape, friction and shearing, hot water or constrictive clothing  -Do not massage red bony areas    Outcome: Progressing  Goal: Incision(s), wounds(s) or drain site(s) healing without S/S of infection  Description: INTERVENTIONS  - Assess and document dressing, incision, wound bed, drain sites and surrounding tissue  - Provide patient and family education  Outcome: Progressing  Goal: Pressure injury heals and does not worsen  Description: Interventions:  - Implement low air loss mattress or specialty surface (Criteria met)  - Apply silicone foam dressing  - Apply fecal or urinary incontinence containment device   - Utilize friction reducing device or surface for transfers   - Consider nutrition services referral as needed  Outcome: Progressing     Problem: HEMATOLOGIC - ADULT  Goal: Maintains hematologic stability  Description: INTERVENTIONS  - Assess for signs and symptoms of bleeding or hemorrhage  - Monitor labs  - Administer supportive blood products/factors as ordered and appropriate  Outcome: Progressing     Problem: MUSCULOSKELETAL - ADULT  Goal: Maintain or return mobility to safest level of function  Description: INTERVENTIONS:  - Assess patient's ability to carry out ADLs; assess patient's baseline for ADL function and identify physical deficits which impact ability to perform ADLs (bathing, care of mouth/teeth, toileting, grooming, dressing, etc.)  - Assess/evaluate cause of self-care deficits   - Assess range of motion  -  Assess patient's mobility  - Assess patient's need for assistive devices and provide as appropriate  - Encourage maximum independence but intervene and supervise when necessary  - Involve family in performance of ADLs  - Assess for home care needs following discharge   - Consider OT consult to assist with ADL evaluation and planning for discharge  - Provide patient education as appropriate  Outcome: Progressing  Goal: Maintain proper alignment of affected body part  Description: INTERVENTIONS:  - Support, maintain and protect limb and body alignment  - Provide patient/ family with appropriate education  Outcome: Progressing     Problem: Nutrition/Hydration-ADULT  Goal: Nutrient/Hydration intake appropriate for improving, restoring or maintaining nutritional needs  Description: Monitor and assess patient's nutrition/hydration status for malnutrition. Collaborate with interdisciplinary team and initiate plan and interventions as ordered.  Monitor patient's weight and dietary intake as ordered or per policy. Utilize nutrition screening tool and intervene as necessary. Determine patient's food preferences and provide high-protein, high-caloric foods as appropriate.     INTERVENTIONS:  - Monitor oral intake, urinary output, labs, and treatment plans  - Assess nutrition and hydration status and recommend course of action  - Evaluate amount of meals eaten  - Assist patient with eating if necessary   - Allow adequate time for meals  - Recommend/ encourage appropriate diets, oral nutritional supplements, and vitamin/mineral supplements  - Order, calculate, and assess calorie counts as needed  - Recommend, monitor, and adjust tube feedings and TPN/PPN based on assessed needs  - Assess need for intravenous fluids  - Provide specific nutrition/hydration education as appropriate  - Include patient/family/caregiver in decisions related to nutrition  Outcome: Progressing     Problem: Prexisting or High Potential for Compromised  Skin Integrity  Goal: Skin integrity is maintained or improved  Description: INTERVENTIONS:  - Identify patients at risk for skin breakdown  - Assess and monitor skin integrity  - Assess and monitor nutrition and hydration status  - Monitor labs   - Assess for incontinence   - Turn and reposition patient  - Assist with mobility/ambulation  - Relieve pressure over bony prominences  - Avoid friction and shearing  - Provide appropriate hygiene as needed including keeping skin clean and dry  - Evaluate need for skin moisturizer/barrier cream  - Collaborate with interdisciplinary team   - Patient/family teaching  - Consider wound care consult   Outcome: Progressing

## 2024-12-02 NOTE — PLAN OF CARE
Problem: OCCUPATIONAL THERAPY ADULT  Goal: Performs self-care activities at highest level of function for planned discharge setting.  See evaluation for individualized goals.  Description: Treatment Interventions: ADL retraining, Functional transfer training, UE strengthening/ROM, Endurance training, Patient/family training, Equipment evaluation/education, Compensatory technique education, Energy conservation, Activityengagement          See flowsheet documentation for full assessment, interventions and recommendations.   Note: Limitation: Decreased ADL status, Decreased UE strength, Decreased endurance, Decreased self-care trans  Prognosis: Good, Fair  Assessment: Pt is a 64 y.o. female who was [facility] to [facility]/12/[facility]24 with Seizure (HCC) . Patient  has a past medical history of Anemia, Arthritis, Cancer of kidney (HCC), Chronic kidney disease, Diabetes mellitus (HCC), Disease of thyroid gland, HLD (hyperlipidemia), Hypertension, Ovarian cancer (HCC), and Pneumonia.   At baseline pt was completing adls with assist from so/caregiver, transfers with supervision and manages motorized w/c Lynnette - has assist for all iadls. Pt lives with s/o in 1 story home with no steps to negotiate. Currently pt requires max assist for overall ADLS and max a x 2 for functional mobility/transfers. Pt currently presents with impairments in the following categories -limited home support, difficulty performing ADLS, difficulty performing IADLS , decreased initiation and engagement , health management , and environment activity tolerance, endurance, standing balance/tolerance, sitting balance/tolerance, and UE strength. These impairments, as well as pt's fatigue, SOB, decreased caregiver support, risk for falls, and home environment  limit pt's ability to safely engage in all baseline areas of occupation, includingeating, grooming, bathing, dressing, toileting, functional mobility/transfers, social participation , and  leisure activities  From OT standpoint, recommend Level II resources upon D/C however s/o has expressed desire to take pt home when medically cleared. OT will continue to follow to address the below stated goals.     Rehab Resource Intensity Level, OT: II (Moderate Resource Intensity)

## 2024-12-02 NOTE — CASE MANAGEMENT
Case Management Discharge Planning Note    Patient name Adelina Dunhamfield  [facility]  MRN [id number]  : 1960 Date 2024       Current Admission Date: 2024  Current Admission Diagnosis:Seizure ([facility])   Patient Active Problem List    Diagnosis Date Noted Date Diagnosed    Swelling of joint of pelvic region or thigh, left 2024     Hypotension 2024     Bacteremia due to Staphylococcus 2024     Abnormal EKG 11/15/2024     Elevated troponin level not due myocardial infarction 11/15/2024     Bacteriuria 11/15/2024     Shock (HCC) 11/15/2024     Acute hypoxic respiratory failure ([facility]) 11/15/2024     Seizure ([facility]) 2024     Diabetic nephropathy associated with type 2 diabetes mellitus ([facility]) 2024     Coagulopathy ([facility]) 2024     Type 2 diabetes mellitus with hyperglycemia, unspecified whether long term insulin use ([facility]) 2024     Chronic anticoagulation 2023     Chronic respiratory failure with hypercapnia ([facility]) 2023     CKD (chronic kidney disease) 2023     Anemia in stage 3b chronic kidney disease  ([facility]) 2023     Chronic respiratory failure with hypoxia ([facility]) 2023     History of hysterectomy 2022     Panniculitis 2022     Abnormal CT scan 2022     Panniculus 2022     Bilateral pleural effusion 2022     Cardiomegaly 2022     Depression, recurrent ([facility]) 2022     Pre-ulcerative corn or callous 2022     Morbid obesity with BMI of 50.0-59.9, adult ([facility]) 2021     Secondary hyperparathyroidism of renal origin (HCC) 2021     GERD (gastroesophageal reflux disease) 2020     Anxiety 2020     Chronic constipation 2020     Paroxysmal atrial fibrillation (HCC) 2020     COPD with asthma ([facility]) 2020     Hypothyroid 2020     Cellulitis 09/10/2020     H/O right nephrectomy 09/10/2020     Hyperparathyroidism (HCC) 2020     Dyslipidemia  02/07/2019     Hypertensive chronic kidney disease with stage 1 through stage 4 chronic kidney disease, or unspecified chronic kidney disease 10/04/2018     Persistent proteinuria 10/04/2018     Iron deficiency 10/04/2018       LOS (days): 20  Geometric Mean LOS (GMLOS) (days): 8.7  Days to GMLOS:-10.9     OBJECTIVE:  Risk of Unplanned Readmission Score: 28.32         Current admission status: Inpatient   Preferred Pharmacy:   [facility] #[id number]  [place] - [address]  [address] [address]  Phone: [phone] Fax: [phone]    [facility] - [place], [place] - [address]  [address]  [address]  [facility] [address]  Phone: [phone] Fax: [phone]    Primary Care Provider: TULIO Beaver    Primary Insurance: [facility]  Secondary Insurance: [facility]    DISCHARGE DETAILS:               Other Referral/Resources/Interventions Provided:  Referral Comments: Per MICU rounds/chart review: pt extubated 11/30/2024. PT/OT evals pending for recs. Per prior conversation w/pts sig other [place], plan for pt to go home when stable for dc - pt is not going to a SNF. CM to follow for dcp needs pending medical course.

## 2024-12-02 NOTE — OCCUPATIONAL THERAPY NOTE
Occupational Therapy Evaluation     Patient Name: Adelina Soto  Today's Date: 12/2/2024  Problem List  Principal Problem:    Seizure (HCC)  Active Problems:    Dyslipidemia    Paroxysmal atrial fibrillation (HCC)    COPD with asthma (HCC)    Hypothyroid    GERD (gastroesophageal reflux disease)    Anxiety    Morbid obesity with BMI of 50.0-59.9, adult (HCC)    Depression, recurrent (HCC)    CKD (chronic kidney disease)    Type 2 diabetes mellitus with hyperglycemia, unspecified whether long term insulin use (HCC)    Abnormal EKG    Elevated troponin level not due myocardial infarction    Bacteriuria    Shock (HCC)    Acute hypoxic respiratory failure (HCC)    Bacteremia due to Staphylococcus    Swelling of joint of pelvic region or thigh, left    Hypotension    Past Medical History  Past Medical History:   Diagnosis Date    Anemia     Arthritis     Cancer of kidney (HCC)     Chronic kidney disease     Diabetes mellitus (HCC)     Disease of thyroid gland     HLD (hyperlipidemia)     Hypertension     Ovarian cancer (HCC)     Pneumonia      Past Surgical History  Past Surgical History:   Procedure Laterality Date    CHOLECYSTECTOMY      CT GUIDED PERC DRAINAGE CATHETER PLACEMENT  5/16/2016    FL LUMBAR PUNCTURE DIAGNOSTIC  11/19/2024    GALLBLADDER SURGERY  05/01/2004    HYSTERECTOMY  06/01/2018    NEPHRECTOMY Right 08/02/2018 12/02/24 1145   OT Last Visit   OT Visit Date 12/02/24   Note Type   Note type Evaluation   Pain Assessment   Pain Assessment Tool 0-10   Pain Score No Pain   Restrictions/Precautions   Weight Bearing Precautions Per Order No   Other Precautions Chair Alarm;Bed Alarm;Multiple lines;O2;Fall Risk   Home Living   Type of Home House   Home Layout One level   Bathroom Shower/Tub Walk-in shower   Bathroom Toilet Raised   Bathroom Equipment Grab bars in shower;Shower chair   Home Equipment Wheelchair-electric   Prior Function   Level of [place] Needs assistance with ADLs;Modified  independent with wheelchair;Needs assistance with IADLS   Lives With Significant other   Receives Help From Family;Personal care attendant   IADLs Family/Friend/Other provides transportation;Family/Friend/Other provides meals;Family/Friend/Other provides medication management   Falls in the last 6 months 0   Vocational Unemployed   Lifestyle   Autonomy receives assist from caretaker/so for adls, able to transfer with supervision, I with management of motorized w/c - s/o manages iadls   Reciprocal Relationships supportive family/caretaker   Service to Others not working   Intrinsic Gratification sedentary -watches TV and plays computer games   Subjective   Subjective offers no c/o   ADL   Eating Assistance Unable to assess   Grooming Assistance 2  Maximal Assistance   UB Bathing Assistance 2  Maximal Assistance   LB Bathing Assistance 2  Maximal Assistance   UB Dressing Assistance 2  Maximal Assistance   LB Dressing Assistance 2  Maximal Assistance   Toileting Assistance  1  Total Assistance   Bed Mobility   Supine to Sit 2  Maximal assistance   Additional items Assist x 2   Sit to Supine 2  Maximal assistance   Additional items Assist x 3   Transfers   Additional Comments max a x 2 to complete 2 lateral scoots towards HOB - unable to achieve full stance   Balance   Static Sitting Fair -   Dynamic Sitting Poor +   Static Standing Zero   Activity Tolerance   Activity Tolerance Patient limited by fatigue;Treatment limited secondary to medical complications (Comment)   Medical Staff Made Aware PT present for co-eval 2* medical complexity, comorbidities and limited overall tolerance to activities   RUE Assessment   RUE Assessment X  (per s/o pt with chronic bursitis in R shld and was limited to ~90* ROM PTA)   LUE Assessment   LUE Assessment WFL   Cognition   Arousal/Participation Alert;Cooperative   Attention Within functional limits   Orientation Level Oriented to place;Oriented to person;Oriented to time;Oriented to  situation   Memory Decreased recall of precautions   Following Commands Follows one step commands without difficulty   Assessment   Limitation Decreased ADL status;Decreased UE strength;Decreased endurance;Decreased self-care trans   Prognosis Good;Fair   Assessment Pt is a 64 y.o. female who was admitted to [facility] on 11/12/2024 with Seizure (HCC) . Patient  has a past medical history of Anemia, Arthritis, Cancer of kidney (HCC), Chronic kidney disease, Diabetes mellitus (HCC), Disease of thyroid gland, HLD (hyperlipidemia), Hypertension, Ovarian cancer (HCC), and Pneumonia.   At baseline pt was completing adls with assist from so/caregiver, transfers with supervision and manages motorized w/c Lynnette - has assist for all iadls. Pt lives with s/o in 1 story home with no steps to negotiate. Currently pt requires max assist for overall ADLS and max a x 2 for functional mobility/transfers. Pt currently presents with impairments in the following categories -limited home support, difficulty performing ADLS, difficulty performing IADLS , decreased initiation and engagement , health management , and environment activity tolerance, endurance, standing balance/tolerance, sitting balance/tolerance, and UE strength. These impairments, as well as pt's fatigue, SOB, decreased caregiver support, risk for falls, and home environment  limit pt's ability to safely engage in all baseline areas of occupation, includingeating, grooming, bathing, dressing, toileting, functional mobility/transfers, social participation , and leisure activities  From OT standpoint, recommend Level II resources upon D/C however s/o has expressed desire to take pt home when medically cleared. OT will continue to follow to address the below stated goals.   Goals   Patient Goals none stated   Long Term Goal #1 1) Mod a UB/LB adls after setup with use of LHAE PRN  2)  Mod a toileting and clothing management  3) Min a bed mobility  4) Mod a functional  mob/transfers to and from all surfaces with fair balance/safety   5) Increase activity tolerance to 25-30 min for participation in adls and enjoyable activities  6) Assess DME needs   7) Demonstrate good carryover with safe use of AD during functional tasks   8) Assess DME needs   9) Assist with safe d/c recommendations   Plan   Treatment Interventions ADL retraining;Functional transfer training;UE strengthening/ROM;Endurance training;Patient/family training;Equipment evaluation/education;Compensatory technique education;Energy conservation;Activityengagement   Goal Expiration Date 12/16/24   OT Frequency 2-3x/wk   Discharge Recommendation   Rehab Resource Intensity Level, OT II (Moderate Resource Intensity)   AM-PAC Daily Activity Inpatient   Lower Body Dressing 2   Bathing 2   Toileting 1   Upper Body Dressing 2   Grooming 2   Eating 2   Daily Activity Raw Score 11   Daily Activity Standardized Score (Calc for Raw Score >=11) 29.04   AM-[facility] Applied Cognition Inpatient   Following a Speech/Presentation 3   Understanding Ordinary Conversation 4   Taking Medications 3   Remembering Where Things Are Placed or Put Away 3   Remembering List of 4-5 Errands 3   Taking Care of Complicated Tasks 3   Applied Cognition Raw Score 19   Applied Cognition Standardized Score 39.77   End of Consult   Education Provided Yes;Family or social support of family present for education by provider   Patient Position at End of Consult Supine;Bed/Chair alarm activated;All needs within reach   Nurse Communication Nurse aware of consult       The patient's raw score on the AM-PAC Daily Activity Inpatient Short Form is 11. A raw score of less than 19 suggests the patient may benefit from discharge to post-acute rehabilitation services. Please refer to the recommendation of the Occupational Therapist for safe discharge planning.      Documentation Completed By:    CORY Chowdhury/L  [employer] Certified - [id number]

## 2024-12-02 NOTE — PROGRESS NOTES
Progress Note - Critical Care/ICU   Name: Adelina Soto 64 y.o. female I MRN: [id number]  [facility] Date of Admission: 11/12/2024   Date of Service: 12/2/2024 I Hospital Day: 20      Summary:  Patient is a 64 year old female with history of Afib on Warfarin, HTN, T2DM, morbid obesity, HLD, hypothyroidism, initially presented to [facility] on 11/12 after being found by her partner at home with AMS, seizure-like activity. EMS witnessed tonic-clonic seizure-like activity, treated with 6 mg of Versed. 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 Was subsequently intubated for airway protection and transferred to [facility]. Initial LP generally unremarkable apart from RBC's 300's from otherwise atraumatic tap, negative ME panel and HSV. 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     [facility], during evaluation pressors were weaned and AEDs adjusted. Underwent CT head which appeared unchanged, LP on 11/19 with fluoro unsuccessful. Off video EEG 11/20, repeat MRI showed no acute findings. Minimal improvements in neurological exam thus far, does open eyes to voice and blink on command. Ongoing discussion regarding goals of care, tracheostomy/feeding tube in LTAC versus comfort care, POA thus far remains treatment oriented. Had family meeting 11/25, declined tracheostomy and also would not wish to have her remain in an LTAC / nursing home long term. Additionally discussed 11/26 that she would not like to be resuscitated in the event of a cardiac arrest, code status updated to Level 2 DNR/DNI.     Overall very gradual improvements in neurological exam day-to-day, able to breath spontaneously on the ventilator and follow basic commands.  Extubated 11/30, now stable for the floor. Of note did have localized area of redness and swelling on left medial thigh that was aspirated on 11/30 (primarily serosanguineous drainage).     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  Pain control with as needed oxy and Tylenol     Diagnosis: Depression   Plan:  Continue home Zoloft     CV:   Diagnosis: Shock septic versus hemorrhagic- resolved, hypotension  Plan:  MAP greater than 65  Remains off pressors  Consider IV fluid and encourage oral intake      Diagnosis: Afib on Warfarin  Plan:  Holding Lopressor 50 mg PO TID due to hypotension; however rate relatively controlled < 110bpm  AC with previously w/ Lovenox 1 mg/kg BID, held d/t soft pressures with paraspinal hematoma on imaging, now back on heparin gtt  Consider amiodarone for rate control if persistently > 110 bpm     Pulm:  Diagnosis: Acute hypoxic respiratory failure  11/12 intubated on arrival for airway protection due to AMS  11/12 bronchoscopy with generalized edematous friable mucosa and moderate thick purulent secretions, culture with mixed respiratory viktoriya  11/20 repeat bronchoscopy generally unremarkable  11/30 extubated to high flow nasal cannula  Now saturating well on normal cannula     GI:   Diagnosis: GERD  Plan:  Continue home pepcid     Diagnosis: Rectal bleeding  11/26 noted to have some rectal bleeding with a blood clot near rectal tube site   History of internal hemorrhoids, suspect this was likely related to trauma from rectal  tube insertion or manipulation  Removed, continue to monitor for any persistent clots/bleeding     :   Diagnosis: CKD  Baseline Cr 1.4-1.8  Remains baseline throughout hospital stay  Monitor renal fx, monitor I/O's, avoid nephrotoxins  Trial peace removal     F/E/N:   F - none  E - K > 4, Mg > 2, Phos > 3  N -dysphagia 1 puréed diet with thin liquids     Heme/Onc:   Diagnosis: Anemia  Hgb 9-10 this admission  11/28 transfused 1u pRBC's due to hemoglobin 6.7, found to have small hemmorhage in the left paraspinal muscle  Consider resuming anticoagulation with heparin if hemoglobin stable     Endo:   Diagnosis: Hypothyroidism  TSH 0.29, T4 1.23  Continue home levothyroxine     Diagnosis: T2DM  Diet controlled, not on medications outpatient  Monitor BG for goal 140-180  Add SSI if needed     ID:   Diagnosis: HSV encephalitis  Initial MRI brain w/ c/f HSV encephalitis based on left temporal enhancement  LP and ME panel neg, unfortunately unable to obtain second CSF analyses for confirmation  Status post 14-day course of acyclovir  Repeat infectious w/u if repeatedly elevated temp  Continue to monitor off antivirals     Diagnosis: UTI, possible pneumonia  Urine culture growing E. coli and ESBL proteus  Earlier bronchoscopy with copious purulent secretions, however culture with mixed respiratory viktoriya  Not treated earlier in admission given lack of fever, improvement in leukocytosis     MSK/Skin:   Diagnosis: Pressure wounds  11/28 CT A/P notable for small bleed in the left posterior paraspinal musculature ~L4-L5; no retroperitoneal hemmorhage, hgb has since been stable after 1u pRBC's  11/29 noted to have left medial thigh erythema/fluctuance concerning for abscess, underwent I&D with serosanguineous drainage     Diagnosis: Pressure wounds  Continue wound care    Disposition: Critical care    ICU Core Measures     A: Assess, Prevent, and Manage Pain Has pain been assessed? Yes  Need for changes to pain regimen? No   B:  Both SAT/SAT  N/A   C: Choice of Sedation RASS Goal: 0 Alert and Calm  Need for changes to sedation or analgesia regimen? No   D: Delirium CAM-ICU: Negative   E: Early Mobility  Plan for early mobility? Yes   F: Family Engagement Plan for family engagement today? Yes       Review of Invasive Devices:    Stringer Plan: Continue for accurate I/O monitoring for 48 hours        Prophylaxis:  VTE VTE covered by:  heparin (porcine), Intravenous, 13 Units/kg/hr at 12/02/24 1408  heparin (porcine), Intravenous  heparin (porcine), Intravenous       Stress Ulcer  covered byfamotidine (PEPCID) 20 mg tablet [[id number]] (Long-Term Med), pantoprazole (PROTONIX) 40 mg tablet [[id number]] (Long-Term Med), pantoprazole (PROTONIX) injection 40 mg [[id number]]         24 Hour Events : AISSATOU. Saturating well on low flow nasal cannula. Still has some erythema over left medial thigh. Otherwise in no distress.    Subjective   Review of Systems: Review of Systems   Constitutional:  Negative for chills and fever.   HENT:  Negative for ear pain and sore throat.    Eyes:  Negative for pain and visual disturbance.   Respiratory:  Positive for cough. Negative for shortness of breath.    Cardiovascular:  Negative for chest pain and palpitations.   Gastrointestinal:  Negative for abdominal pain and vomiting.   Genitourinary:  Negative for dysuria and hematuria.   Musculoskeletal:  Negative for arthralgias and back pain.   Skin:  Negative for color change and rash.   Neurological:  Negative for seizures and syncope.   All other systems reviewed and are negative.      Objective :                   Vitals I/O      Most Recent Min/Max in 24hrs   Temp 98.5 °F (36.9 °C) Temp  Min: 98.4 °F (36.9 °C)  Max: 98.9 °F (37.2 °C)   Pulse 101 Pulse  Min: 87  Max: 101   Resp 16 Resp  Min: 14  Max: 21   /68 BP  Min: 81/46  Max: 151/63   O2 Sat 90 % SpO2  Min: 90 %  Max: 99 %      Intake/Output Summary (Last 24 hours) at 12/2/2024 1500  Last data filed at 12/2/2024  0600  Gross per 24 hour   Intake 450.11 ml   Output 550 ml   Net -99.89 ml       Diet Dysphagia/Modified Consistency; Dysphagia 1-Pureed; Thin Liquid    Invasive Monitoring           Physical Exam   Physical Exam  Vitals reviewed.   Eyes:      Pupils: Pupils are equal, round, and reactive to light.   Skin:     General: Skin is warm and dry.   HENT:      Head: Normocephalic.      Mouth/Throat:      Mouth: Mucous membranes are dry.   Cardiovascular:      Rate and Rhythm: Normal rate. Rhythm irregular.      Heart sounds: Normal heart sounds.   Abdominal:      Palpations: Abdomen is soft.      Tenderness: There is no abdominal tenderness.   Constitutional:       General: She is not in acute distress.     Appearance: She is not ill-appearing.   Pulmonary:      Effort: Pulmonary effort is normal. No respiratory distress.      Breath sounds: Normal breath sounds.   Neurological:      Mental Status: She is alert. She is calm.      Comments: AAOx4.  No focal deficits   Genitourinary/Anorectal:  Stringer present.        Diagnostic Studies        Lab Results: I have reviewed the following results:     Medications:  Scheduled PRN   atorvastatin, 10 mg, Daily With Dinner  chlorhexidine, 15 mL, Q12H LINO  levalbuterol, 1.25 mg, TID  levETIRAcetam, 750 mg, Q12H LINO  levothyroxine, 100 mcg, Early Morning  lidocaine, 2 patch, Daily  EARLINE ANTIFUNGAL, , BID  pantoprazole, 40 mg, Q12H LINO  sertraline, 50 mg, Daily  sodium chloride, 4 mL, TID  thiamine, 100 mg, Daily  valproic acid, 500 mg, Q6H LINO  vancomycin, 15 mg/kg, Q12H      acetaminophen, 650 mg, Q4H PRN  albuterol, 2.5 mg, Q6H PRN  heparin (porcine), 10,000 Units, Q6H PRN  heparin (porcine), 5,000 Units, Q6H PRN  ondansetron, 4 mg, Q4H PRN       Continuous    heparin (porcine), 3-30 Units/kg/hr (Order-Specific), Last Rate: 13 Units/kg/hr (12/02/24 1408)         Labs:   CBC    Recent Labs     12/01/24  0541 12/02/24  0555   WBC 5.81 4.81   HGB 8.1* 7.8*   HCT 28.4* 27.3*   *  355   BANDSPCT  --  4     BMP    Recent Labs     12/01/24  0541 12/02/24  0555   SODIUM 144 143   K 3.8 4.2    106   CO2 32 32   AGAP 8 5   BUN 37* 35*   CREATININE 1.25 1.30   CALCIUM 8.9 8.7       Coags    Recent Labs     12/01/24  1030 12/01/24  1719 12/02/24  0135 12/02/24  1023   INR 1.22*  --   --   --    PTT 34   < > 106* 68*    < > = values in this interval not displayed.        Additional Electrolytes  Recent Labs     12/01/24  0541 12/02/24  0555   MG 2.2 2.1   PHOS 3.9 4.1          Blood Gas    No recent results    No recent results   LFTs  No recent results    Infectious  No recent results  Glucose  Recent Labs     12/01/24  0541 12/02/24  0555   GLUC 91 77

## 2024-12-02 NOTE — ASSESSMENT & PLAN NOTE
Possible thigh hematoma versus abscess.  With hypotension consideration for the possibility of infection or bleeding.  -Check CT of the left leg  -May need aspiration or I&D of the leg to further define  -Begin intravenous vancomycin awaiting culture results  -Serial exams

## 2024-12-02 NOTE — PROGRESS NOTES
Progress Note - Infectious Disease   Name: Adelina Soto 64 y.o. female I MRN: [id number]  Unit/Bed#: MICU 01 I Date of Admission: 11/12/2024   Date of Service: 12/2/2024 I Hospital Day: 20    Assessment & Plan  Swelling of joint of pelvic region or thigh, left  Possible thigh hematoma versus abscess.  With hypotension consideration for the possibility of infection or bleeding.  -Check CT of the left leg  -May need aspiration or I&D of the leg to further define  -Begin intravenous vancomycin awaiting culture results  -Serial exams  Hypotension  Unclear etiology.  Possibly a volume issue.  Consideration for the possibility of leg infection or bleeding with hematoma.  -Check blood cultures x 2 sets  -Antibiotics as above  -Additional workup as above  Seizure (HCC)   Patient presented with acute encephalopathy and seizure-like activity.  Video EEG concerning for status epilepticus.  MRI brain without contrast was limited by motion but showed restricted diffusion in the left temporal lobe which could be postictal versus infection.  Status post LP 11/12 and CSF studies unremarkable, not consistent with infection with 2 WBCs.  Protein mildly elevated which could be due to seizure activity itself.  ME panel negative, stand alone HSV PCR also negative.  However with new onset seizures and possible abnormality of the temporal lobe HSV remains a possibility as PCR may be negative and CSF bland and early disease.  CSF culture with no growth.  The patient has grown E. coli and ESBL Proteus in urine culture but in the absence of prior systemic signs of infection or localizing symptoms UTI, doubt this is causing status epilepticus. HIV negative. Fever and leukocytosis improved.  Mental status remains poor.  IR unable to perform repeat LP due to technical difficulty.  MRI brain without evidence of acute infarct, intracranial hemorrhage or mass.  Mental status improving, she is now following commands.  Patient completed 14 days  of intravenous acyclovir and is now stable off all antivirals              -No additional IV acyclovir for now  -Antiepileptic drugs as per neurology  -Monitor cognition  Bacteriuria  UA with pyuria and urine culture growing E. coli and ESBL Proteus.  No symptoms of infection prior to admission.  Unlikely cause of status epilepticus however in absence of clear cause of seizure activity treated for possible cystitis with 3 days zosyn   -monitor off antibiotics   Shock ([facility])  May be multifactorial related to possible infection, sedating medications.  Off vasopressor support.  Now with recurrent shock that I suspect is secondary to GI bleeding with notable severe anemia.   -Hemodynamic support per critical care team   -Transfusion support  Acute hypoxic respiratory failure ([facility])  Patient intubated for airway protection.  Status post bronchoscopy 11/13 for mucous plugging and thick left-sided secretions seen.  Chest x-ray not consistent with pneumonia.  BAL culture shows growth of mixed respiratory viktoriya.  Chest x-ray with bilateral airspace opacities but no clinical evidence of pneumonia, patient has been on stable ventilator settings without significant secretions.  No clinical evidence of pneumonia at this time.  Repeat chest x-ray with some left basilar consolidation consistent with atelectasis and possible effusion.   -Ventilator management per critical care team   -No additional antibiotics indicated.  Bacteremia due to Staphylococcus  1 of 2 blood cultures collected 11/24 are growing staph epidermidis. Likely contaminant. Monitor off additional antibiotics   Morbid obesity with BMI of 50.0-59.9, adult ([facility])  Affects antimicrobial dosing  CKD (chronic kidney disease)  Lab Results   Component Value Date    EGFR 43 12/02/2024    EGFR 45 12/01/2024    EGFR 45 11/30/2024    CREATININE 1.30 12/02/2024    CREATININE 1.25 12/01/2024    CREATININE 1.24 11/30/2024   Baseline creatinine 1.7-1.9.  Creatinine stable, slightly  below baseline.  Dose adjust antibiotics as needed for creatinine clearance.  Monitor creatinine daily  Type 2 diabetes mellitus with hyperglycemia, unspecified whether long term insulin use ([facility])  Lab Results   Component Value Date    HGBA1C 5.7 (H) 11/13/2024   Diabetes well-controlled.  Glycemic management per primary team    I have discussed with the critical care service the above plan to do CT of the leg, and begin the intravenous vancomycin after blood cultures obtained.  The critical care service agrees with the plan.    Antibiotics:  Off IV acyclovir 6    Subjective   Patient without fever chills or sweats.  Has now become hypotensive.  No nausea vomiting or diarrhea.  Extubated and stable from respiratory standpoint.  Found to have left thigh swelling, induration, and tenderness    Objective :  Temp:  [97.7 °F (36.5 °C)-98.9 °F (37.2 °C)] 98.4 °F (36.9 °C)  HR:  [] 95  BP: ()/(46-66) 151/63  Resp:  [14-30] 14  SpO2:  [91 %-99 %] 95 %  O2 Device: Nasal cannula  Nasal Cannula O2 Flow Rate (L/min):  [4 L/min-9 L/min] 4 L/min    General:  No acute distress  Psychiatric:  Awake and alert, still a bit confused  Pulmonary:  Normal respiratory excursion without accessory muscle use  Abdomen:  Soft, nontender  Extremities: Left thigh with swelling and faint erythema.  Skin:  No other rashes      Lab Results: I have reviewed the following results:  Results from last 7 days   Lab Units 12/02/24  0555 12/01/24  0541 11/30/24  0547   WBC Thousand/uL 4.81 5.81 5.76   HEMOGLOBIN g/dL 7.8* 8.1* 8.2*   PLATELETS Thousands/uL 355 394* 367     Results from last 7 days   Lab Units 12/02/24  0555 12/01/24  0541 11/30/24  0547   SODIUM mmol/L 143 144 139   POTASSIUM mmol/L 4.2 3.8 5.0   CHLORIDE mmol/L 106 104 101   CO2 mmol/L 32 32 30   BUN mg/dL 35* 37* 32*   CREATININE mg/dL 1.30 1.25 1.24   EGFR ml/min/1.73sq m 43 45 45   CALCIUM mg/dL 8.7 8.9 8.9     Results from last 7 days   Lab Units 11/29/24  2142   GRAM  STAIN RESULT  No Polys or Bacteria seen   WOUND CULTURE  No growth

## 2024-12-02 NOTE — PLAN OF CARE
Problem: INFECTION - ADULT  Goal: Absence or prevention of progression during hospitalization  Description: INTERVENTIONS:  - Assess and monitor for signs and symptoms of infection  - Monitor lab/diagnostic results  - Monitor all insertion sites, i.e. indwelling lines, tubes, and drains  - Monitor endotracheal if appropriate and nasal secretions for changes in amount and color  - [place] appropriate cooling/warming therapies per order  - Administer medications as ordered  - Instruct and encourage patient and family to use good hand hygiene technique  - Identify and instruct in appropriate isolation precautions for identified infection/condition  Outcome: Progressing  Goal: Absence of fever/infection during neutropenic period  Description: INTERVENTIONS:  - Monitor WBC    Outcome: Progressing     Problem: SAFETY ADULT  Goal: Patient will remain free of falls  Description: INTERVENTIONS:  - Educate patient/family on patient safety including physical limitations  - Instruct patient to call for assistance with activity   - Consult OT/PT to assist with strengthening/mobility   - Keep Call bell within reach  - Keep bed low and locked with side rails adjusted as appropriate  - Keep care items and personal belongings within reach  - Initiate and maintain comfort rounds  - Make Fall Risk Sign visible to staff  - Offer Toileting every 2 Hours, in advance of need  - Initiate/Maintain bed alarm  - Obtain necessary fall risk management equipment: bed alarm  - Apply yellow socks and bracelet for high fall risk patients  - Consider moving patient to room near nurses station  Outcome: Progressing  Goal: Maintain or return to baseline ADL function  Description: INTERVENTIONS:  -  Assess patient's ability to carry out ADLs; assess patient's baseline for ADL function and identify physical deficits which impact ability to perform ADLs (bathing, care of mouth/teeth, toileting, grooming, dressing, etc.)  - Assess/evaluate cause of  self-care deficits   - Assess range of motion  - Assess patient's mobility; develop plan if impaired  - Assess patient's need for assistive devices and provide as appropriate  - Encourage maximum independence but intervene and supervise when necessary  - Involve family in performance of ADLs  - Assess for home care needs following discharge   - Consider OT consult to assist with ADL evaluation and planning for discharge  - Provide patient education as appropriate  Outcome: Progressing  Goal: Maintains/Returns to pre admission functional level  Description: INTERVENTIONS:  - Perform AM-PAC 6 Click Basic Mobility/ Daily Activity assessment daily.  - Set and communicate daily mobility goal to care team and patient/family/caregiver.   - Collaborate with rehabilitation services on mobility goals if consulted  - Perform Range of Motion 2 times a day.  - Reposition patient every 2 hours.  - Dangle patient 1 times a day    Problem: GASTROINTESTINAL - ADULT  Goal: Minimal or absence of nausea and/or vomiting  Description: INTERVENTIONS:  - Administer IV fluids if ordered to ensure adequate hydration  - Maintain NPO status until nausea and vomiting are resolved  - Nasogastric tube if ordered  - Administer ordered antiemetic medications as needed  - Provide nonpharmacologic comfort measures as appropriate  - Advance diet as tolerated, if ordered  - Consider nutrition services referral to assist patient with adequate nutrition and appropriate food choices  Outcome: Progressing  Goal: Maintains or returns to baseline bowel function  Description: INTERVENTIONS:  - Assess bowel function  - Encourage oral fluids to ensure adequate hydration  - Administer IV fluids if ordered to ensure adequate hydration  - Administer ordered medications as needed  - Encourage mobilization and activity  - Consider nutritional services referral to assist patient with adequate nutrition and appropriate food choices  Outcome: Progressing  Goal:  Maintains adequate nutritional intake  Description: INTERVENTIONS:  - Monitor percentage of each meal consumed  - Identify factors contributing to decreased intake, treat as appropriate  - Assist with meals as needed  - Monitor I&O, weight, and lab values if indicated  - Obtain nutrition services referral as needed  Outcome: Progressing  Goal: Establish and maintain optimal ostomy function  Description: INTERVENTIONS:  - Assess bowel function  - Encourage oral fluids to ensure adequate hydration  - Administer IV fluids if ordered to ensure adequate hydration   - Administer ordered medications as needed  - Encourage mobilization and activity  - Nutrition services referral to assist patient with appropriate food choices  - Assess stoma site  - Consider wound care consult   Outcome: Progressing  Goal: Oral mucous membranes remain intact  Description: INTERVENTIONS  - Assess oral mucosa and hygiene practices  - Implement preventative oral hygiene regimen  - Implement oral medicated treatments as ordered  - Initiate Nutrition services referral as needed  Outcome: Progressing   - Out of bed for toileting  - Record patient progress and toleration of activity level   Outcome: Progressing

## 2024-12-02 NOTE — SPEECH THERAPY NOTE
"Speech Language/Pathology  Speech-Language Pathology Progress Note      Patient Name: Adelina Soto    Today's Date: 12/2/2024      Subjective:  Pt was awake and alert. She was sitting upright in bed. Patient stated \"No I don't want to \".  Per staff, pt not eating, does not like puree.  She had difficulty with soft solids on the initial eval and then declined trials.     Objective:  Pt was seen today for dysphagia therapy. Current diet is puree with thin liquids. Pt was on 4L O2 via nasal cannula. Oral care had already been completed. Focus of today's session was determine potential for diet texture advancement. Textures offered today included toast, thin water by straw.  .  Swallow function:   The pt had fair bite of the solid-did take larger amounts.  Slow deliberate attempts to breakdown the solids. She requests alternated sips thin in between.  Mult swallows noted.  No gross oral residue today following the sips and mult swallows to clear.    Education provided to pt and caretaker on diet and changes being made.      Assessment:  Pt w/ improved manipulation and breakdown of the solids today.  Agreed to try the level 3.     Plan:  Change diet texture to dysphagia 3 dental soft. Continue ST follow up. Subsequent sessions to focus on determining potential for diet texture advancement, improving pt's self monitoring, and improving use of strategies to minimize risk of aspiration.  Continue w/ oral care, assist w/ meals.   "

## 2024-12-02 NOTE — PROGRESS NOTES
Vancomycin IV Pharmacy-to-Dose Consultation    Adelina Soto is a 64 y.o. female who is currently receiving Vancomycin IV with management by the Pharmacy Consult service.    Relevant clinical data and objective / subjective history reviewed.      Vancomycin Assessment:  Indication: Pneumonia (goal -600, trough >10)    Status: critically ill  Micro:   - Pending  Renal Function:     Lab Results   Component Value Date    CREATININE 1.30 12/02/2024     Estimated Creatinine Clearance: 56.4 mL/min (by C-G formula based on SCr of 1.3 mg/dL).  Dialysis: no  Days of Therapy: 1  Current Dose: 1000 mg IV q24h   Goal AUC / Trough: -600, trough >10   Last Level: None  Model Fit:  Good  Assessment: Concern for abscess.      Vancomycin Plan:  New Dosing: continue current regimen   Predicted Trough / AUC: 12.5 / 446  Next Level: on 12/3 at 0600  Renal Function Monitoring: Daily BMP and UOP      Pharmacy will continue to follow closely for s/sx of nephrotoxicity, infusion reactions and appropriateness of therapy.  BMP and CBC will be ordered per protocol. We will continue to follow the patient’s culture results and clinical progress daily.       Arpit Jacob, PharmD, BCCCP  Critical Care Clinical Pharmacist  Available via Secure Chat

## 2024-12-02 NOTE — ASSESSMENT & PLAN NOTE
Patient presented with acute encephalopathy and seizure-like activity.  Video EEG concerning for status epilepticus.  MRI brain without contrast was limited by motion but showed restricted diffusion in the left temporal lobe which could be postictal versus infection.  Status post LP 11/12 and CSF studies unremarkable, not consistent with infection with 2 WBCs.  Protein mildly elevated which could be due to seizure activity itself.  ME panel negative, stand alone HSV PCR also negative.  However with new onset seizures and possible abnormality of the temporal lobe HSV remains a possibility as PCR may be negative and CSF bland and early disease.  CSF culture with no growth.  The patient has grown E. coli and ESBL Proteus in urine culture but in the absence of prior systemic signs of infection or localizing symptoms UTI, doubt this is causing status epilepticus. HIV negative. Fever and leukocytosis improved.  Mental status remains poor.  IR unable to perform repeat LP due to technical difficulty.  MRI brain without evidence of acute infarct, intracranial hemorrhage or mass.  Mental status improving, she is now following commands.  Patient completed 14 days of intravenous acyclovir and is now stable off all antivirals              -No additional IV acyclovir for now  -Antiepileptic drugs as per neurology  -Monitor cognition

## 2024-12-02 NOTE — PROGRESS NOTES
Progress Note - Critical Care/ICU   Name: Adelina Soto 64 y.o. female I MRN: [id number]  [facility] Date of Admission: 11/12/2024   Date of Service: 12/2/2024 I Hospital Day: 20       Critical Care Interval Transfer Note:    Brief Hospital Summary:   Patient is a 64 year old female with history of Afib on Warfarin, HTN, T2DM, morbid obesity, HLD, hypothyroidism, initially presented to [facility] on 11/12 after being found by her partner at home with AMS, seizure-like activity. EMS witnessed tonic-clonic seizure-like activity, treated with 6 mg of Versed. 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 Was subsequently intubated for airway protection and transferred to [facility]. Initial LP generally unremarkable apart from RBC's 300's from otherwise atraumatic tap, negative ME panel and HSV. 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     Transferred to MICU on 11/17, during evaluation pressors were weaned and AEDs adjusted. Underwent CT head which appeared unchanged, LP on 11/19 with fluoro unsuccessful. Off video EEG 11/20, repeat MRI showed no acute findings. Minimal improvements in neurological exam thus far, does open eyes to voice and blink on command. Ongoing discussion regarding goals of care, tracheostomy/feeding tube in LTAC versus comfort care, POA thus far remains treatment oriented. Had family meeting 11/25, declined tracheostomy and also would not wish to have her remain in an LTAC / nursing home long term. Additionally discussed 11/26 that she would not like to be resuscitated in the event of a cardiac arrest, code status updated to Level 2 DNR/DNI.     Overall very gradual improvements in neurological exam day-to-day, able to breath  spontaneously on the ventilator and follow basic commands. Extubated 11/30, now stable for the floor. Of note did have localized area of redness and swelling on left medial thigh that was aspirated on 11/30 (primarily serosanguineous drainage). Will start on Vancomycin, pending repeat imaging of abdomen/pelvis to reassess previously noted left paraspinal muscle hemorrhage, and CTA LLE to evaluate for abscess.    Barriers to discharge:   Follow up CTA LLE and CTA abdomen/pelvis  PT/OT evaluation for potential rehab needs  Completion of antibiotics for SSTI     Consults: IP CONSULT TO CASE MANAGEMENT  IP CONSULT TO INFECTIOUS DISEASES  IP CONSULT TO CARDIOLOGY  IP CONSULT TO VENOUS ACCESS TEAM  IP CONSULT TO PHARMACY    Recommended to review admission imaging for incidental findings and document in discharge navigator: Chart reviewed, no known incidental findings noted at this time.      Discharge Plan: Anticipate discharge in >72 hrs to discharge location to be determined pending rehab evaluations.    PT Recommendations: Post acute rehabilitation servicesOT Recommendations: Post acute rehabilitation services,    Patient seen and evaluated by Critical Care today and deemed to be appropriate for transfer to [facility] with Telemetry. Spoke to Dr. Gonzalez from [facility] to accept transfer. Critical care can be contacted via SecureChat with any questions or concerns. Please use the Critical Care AP Role in Secure Chat for any provider inquires until the patient is transferred out of the ICU or until tomorrow at 0600.

## 2024-12-02 NOTE — PLAN OF CARE
Problem: PHYSICAL THERAPY ADULT  Goal: Performs mobility at highest level of function for planned discharge setting.  See evaluation for individualized goals.  Description: Treatment/Interventions: Functional transfer training, Endurance training, Bed mobility, Spoke to nursing, OT          See flowsheet documentation for full assessment, interventions and recommendations.  12/2/2024 1625 by Shivam Ngo  Note: Prognosis: Fair  Problem List: Decreased strength, Decreased range of motion, Decreased endurance, Impaired balance, Decreased mobility, Decreased coordination  Assessment: Pt is a 64 y.o. female who was admitted to [facility] with seizure. Pt  has a past medical history of Anemia, Arthritis, Cancer of kidney (HCC), Chronic kidney disease, Diabetes mellitus (HCC), Disease of thyroid gland, HLD (hyperlipidemia), Hypertension, Ovarian cancer (HCC), and Pneumonia.. Pt is alert and oriented, able to answer questions about home setup and prior level of function. Prior to admission, pt reports needing assistance with ADLs and IADLs. Currently pt is presenting as Max assist of 2 for bed mobility, transfers/scoots. Pt would benefit from continued therapy to address deficits in strength, endurance, and balance as well as trialing extended ambulation and stair negotiation. At this time PT is recommending level 2 resources to maximize function.        Rehab Resource Intensity Level, PT: II (Moderate Resource Intensity)    See flowsheet documentation for full assessment.

## 2024-12-02 NOTE — ASSESSMENT & PLAN NOTE
Unclear etiology.  Possibly a volume issue.  Consideration for the possibility of leg infection or bleeding with hematoma.  -Check blood cultures x 2 sets  -Antibiotics as above  -Additional workup as above

## 2024-12-02 NOTE — PROGRESS NOTES
Critical Care Attending Note; Remi Jameson   Note Date: 24  Note Time: 8:33 AM    Patient: Adelina Soto  Age, : 64 y.o., 1960 MRN: [id number] Code Status: Level 2 - DNAR: but accepts endotracheal intubation Patient Location: MICU /MICU    Hospital LOS:20 days  ]   Patient seen and examined, medical record reviewed, discussed with house staff and nursing staff.     HPI   CC: AMS  64F with a PMH Afib, HTN, DM, Obesity, CKD, Hypothyroidism and COPD now representing with AMS and Seizures concerning meningitis/encephalitis.     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  - Competed Acyclovir   - ID consulted    UTI  - Completed Zosyn    LL Abscess/Cellulitis - Concerning for cellulitis  - Infectious Workup  - Start Vanc   - CT LLE    #Endo  DM  - ISS    #DVT/GI ppx  Hep gtt    #Lines/Tubes/Drains:   Invasive Devices       Peripheral Intravenous Line  Duration             Long-Dwell Peripheral IV (Midline) 24 Left Cephalic Vein 9 days    Peripheral IV 24 Left;Proximal;Ventral (anterior) Forearm 1 day              Drain  Duration             External Urinary Catheter <1 day                    #Nutrition:   Diet Dysphagia/Modified Consistency; Dysphagia 1-Pureed; Thin Liquid        #Code Status:   Level 2 - DNAR: but accepts endotracheal intubation    #Dispo:   Floors        Remi Jameson MD  Pulmonary, Critical Care    Critical care time, excluding procedures, teaching, family meetings, and excludes any prior time recorded by the AP/resident, 35 minutes. Upon my evaluation,  "this patient has a high probability of imminent or life-threatening deterioration due to above problems which required my direct attention, intervention, and personal management.   Impression/Active Problems:    Seizure   AMS   Hypotension  Afib   Hypoxic respiratory failure   CKD   Meningitis   UTI   Abscess   DM   HTN          Physical Exam:     Vital Signs:   Weight: 125 kg (274 lb 14.6 oz)  IBW: Ideal body weight: 54.7 kg (120 lb 9.5 oz)  Adjusted ideal body weight: 82.7 kg (182 lb 5.1 oz)  Temp:  [97.7 °F (36.5 °C)-98.9 °F (37.2 °C)] 98.4 °F (36.9 °C)  HR:  [] 95  BP: ()/(46-66) 151/63  Resp:  [14-30] 14  SpO2:  [91 %-99 %] 95 %  O2 Device: Nasal cannula  Nasal Cannula O2 Flow Rate (L/min):  [4 L/min-9 L/min] 4 L/min  General: NAD  Neuro: AxO 3  Heart: RRR   Lungs: Diminished bases  Abdomen: Soft NT  Extremities: LL indurated Area, warm, painful                Ventilator Settings:               Invalid input(s): \"PCO2\", \"O2\"  Radiologic Images Reviewed:    CT Abd/Pel  Suspicion of a small amount of hemorrhage within the left posterior paraspinal musculature at L4-L5 as described above and new since 12/11/2022. Otherwise no definite evidence for retroperitoneal hemorrhage or rectus abdominal sheath hemorrhage as   clinically questioned.     Large ventral abdominal wall hernia containing nonobstructed bowel.     Small left pleural effusion. Left lower lobe atelectasis is noted.     The study was marked in EPIC for immediate notification.     Input / Output:     Intake/Output Summary (Last 24 hours) at 12/2/2024 0833  Last data filed at 12/2/2024 0600  Gross per 24 hour   Intake 1208.86 ml   Output 750 ml   Net 458.86 ml            Infusions:  heparin (porcine), 3-30 Units/kg/hr (Order-Specific), Last Rate: 13 Units/kg/hr (12/02/24 0207)      Scheduled Medications:  Current Facility-Administered Medications   Medication Dose Route Frequency Provider Last Rate    acetaminophen  650 mg Oral Q4H PRN " Gregorio Crain DO      albuterol  2.5 mg Nebulization Q6H PRN Prabhakar Wilkinson DO      atorvastatin  10 mg Per NG Tube Daily With Dinner TULIO Dubois      chlorhexidine  15 mL Mouth/Throat Q12H [facility] TULIO Dubois      heparin (porcine)  3-30 Units/kg/hr (Order-Specific) Intravenous Titrated Manuel Herrera MD 13 Units/kg/hr (12/02/24 0207)    heparin (porcine)  10,000 Units Intravenous Q6H PRN Manuel Herrera MD      heparin (porcine)  5,000 Units Intravenous Q6H PRN Manuel Herrera MD      levalbuterol  1.25 mg Nebulization TID Reba Velez DO      levETIRAcetam  750 mg Per G Tube Q12H [facility] Barby Andrew MD      levothyroxine  100 mcg Per NG Tube Early Morning TULIO Dubois      lidocaine  2 patch Topical Daily Xuan Cadena DO      EARLINE ANTIFUNGAL   Topical BID Barby Andrew MD      norepinephrine           ondansetron  4 mg Intravenous Q4H PRN TULIO Dubois      oxyCODONE  5 mg Oral Q6H PRN Gregorio Crain DO      oxyCODONE  2.5 mg Oral Q6H PRN Gregorio Crain DO      pantoprazole  40 mg Intravenous Q12H [facility] Jose Luis Duff MD      sertraline  50 mg Per NG Tube Daily TULIO Dubois      sodium chloride  4 mL Nebulization TID Rod Ma MD      thiamine  100 mg Per NG Tube Daily Erika Alvarenga MD      valproate sodium  500 mg Intravenous Q6H [facility] Barby Andrew  mg (12/02/24 0347)       PRN Medications:    acetaminophen    albuterol    heparin (porcine)    heparin (porcine)    norepinephrine    ondansetron    oxyCODONE    oxyCODONE    Labs Reviewed:  Results from last 7 days   Lab Units 12/02/24  0555 12/01/24  0541 11/30/24  0547   WBC Thousand/uL 4.81 5.81 5.76   HEMOGLOBIN g/dL 7.8* 8.1* 8.2*   HEMATOCRIT % 27.3* 28.4* 28.0*   PLATELETS Thousands/uL 355 394* 367      Results from last 7 days   Lab Units 12/02/24  0555 12/01/24  0541 11/30/24  0547   SODIUM mmol/L 143 144 139   CO2 mmol/L 32 32 30   BUN mg/dL 35*  "37* 32*   CALCIUM mg/dL 8.7 8.9 8.9   MAGNESIUM mg/dL 2.1 2.2 2.3   PHOSPHORUS mg/dL 4.1 3.9 4.6*         Invalid input(s): \"ASTSGOT\", \"ALTSGPT\"LABRCNTIP@ ,alkphos:3,tbilirubin:3,dbilirubin:3)@  Results from last 7 days   Lab Units 12/01/24  1030   INR  1.22*           Invalid input(s): \"TROPT\", \"PBNP\"             I have personally seen and examined the patient on (12/02/24 between 5366-8425). I discussed the patient with the AP/resident including, but not limited to, verifying findings; reviewing labs and x-rays; discussing with consultants; developing the plan of care with the bedside nurse; and discussing treatment plan with patient or surrogate.  I have reviewed the note and assessment performed by the AP/resident and agree with the AP/resident’s documented findings and plan of care with the above additions/exceptions. Please see my comments for details and adjustments.                 "

## 2024-12-03 LAB
ANION GAP SERPL CALCULATED.3IONS-SCNC: 7 MMOL/L (ref 4–13)
APTT PPP: 79 SECONDS (ref 23–34)
BACTERIA WND AEROBE CULT: NO GROWTH
BUN SERPL-MCNC: 28 MG/DL (ref 5–25)
CALCIUM SERPL-MCNC: 8.8 MG/DL (ref 8.4–10.2)
CHLORIDE SERPL-SCNC: 103 MMOL/L (ref 96–108)
CO2 SERPL-SCNC: 32 MMOL/L (ref 21–32)
CREAT SERPL-MCNC: 1.27 MG/DL (ref 0.6–1.3)
ERYTHROCYTE [DISTWIDTH] IN BLOOD BY AUTOMATED COUNT: 21.2 % (ref 11.6–15.1)
GFR SERPL CREATININE-BSD FRML MDRD: 44 ML/MIN/1.73SQ M
GLUCOSE SERPL-MCNC: 90 MG/DL (ref 65–140)
GRAM STN SPEC: NORMAL
HCT VFR BLD AUTO: 28.4 % (ref 34.8–46.1)
HGB BLD-MCNC: 8.2 G/DL (ref 11.5–15.4)
MAGNESIUM SERPL-MCNC: 2 MG/DL (ref 1.9–2.7)
MCH RBC QN AUTO: 32.5 PG (ref 26.8–34.3)
MCHC RBC AUTO-ENTMCNC: 28.9 G/DL (ref 31.4–37.4)
MCV RBC AUTO: 113 FL (ref 82–98)
PHOSPHATE SERPL-MCNC: 3.5 MG/DL (ref 2.3–4.1)
PLATELET # BLD AUTO: 380 THOUSANDS/UL (ref 149–390)
PMV BLD AUTO: 9.3 FL (ref 8.9–12.7)
POTASSIUM SERPL-SCNC: 4 MMOL/L (ref 3.5–5.3)
RBC # BLD AUTO: 2.52 MILLION/UL (ref 3.81–5.12)
SODIUM SERPL-SCNC: 142 MMOL/L (ref 135–147)
VANCOMYCIN SERPL-MCNC: 12 UG/ML (ref 10–20)
WBC # BLD AUTO: 5.33 THOUSAND/UL (ref 4.31–10.16)

## 2024-12-03 PROCEDURE — 99232 SBSQ HOSP IP/OBS MODERATE 35: CPT | Performed by: INTERNAL MEDICINE

## 2024-12-03 PROCEDURE — 85027 COMPLETE CBC AUTOMATED: CPT

## 2024-12-03 PROCEDURE — 94640 AIRWAY INHALATION TREATMENT: CPT

## 2024-12-03 PROCEDURE — 99233 SBSQ HOSP IP/OBS HIGH 50: CPT

## 2024-12-03 PROCEDURE — 85730 THROMBOPLASTIN TIME PARTIAL: CPT | Performed by: STUDENT IN AN ORGANIZED HEALTH CARE EDUCATION/TRAINING PROGRAM

## 2024-12-03 PROCEDURE — 83735 ASSAY OF MAGNESIUM: CPT

## 2024-12-03 PROCEDURE — 80048 BASIC METABOLIC PNL TOTAL CA: CPT

## 2024-12-03 PROCEDURE — 80202 ASSAY OF VANCOMYCIN: CPT | Performed by: STUDENT IN AN ORGANIZED HEALTH CARE EDUCATION/TRAINING PROGRAM

## 2024-12-03 PROCEDURE — 94664 DEMO&/EVAL PT USE INHALER: CPT

## 2024-12-03 PROCEDURE — 84100 ASSAY OF PHOSPHORUS: CPT

## 2024-12-03 PROCEDURE — 94760 N-INVAS EAR/PLS OXIMETRY 1: CPT

## 2024-12-03 RX ORDER — LEVOTHYROXINE SODIUM 88 UG/1
88 TABLET ORAL
Status: DISCONTINUED | OUTPATIENT
Start: 2024-12-04 | End: 2024-12-03

## 2024-12-03 RX ORDER — LEVOTHYROXINE SODIUM 88 UG/1
88 TABLET ORAL
Status: DISCONTINUED | OUTPATIENT
Start: 2024-12-04 | End: 2024-12-09 | Stop reason: HOSPADM

## 2024-12-03 RX ORDER — PROPRANOLOL HYDROCHLORIDE 20 MG/5ML
20 SOLUTION ORAL EVERY 12 HOURS SCHEDULED
Status: DISCONTINUED | OUTPATIENT
Start: 2024-12-03 | End: 2024-12-09 | Stop reason: HOSPADM

## 2024-12-03 RX ADMIN — LEVALBUTEROL HYDROCHLORIDE 1.25 MG: 1.25 SOLUTION RESPIRATORY (INHALATION) at 19:30

## 2024-12-03 RX ADMIN — THIAMINE HCL TAB 100 MG 100 MG: 100 TAB at 09:11

## 2024-12-03 RX ADMIN — PANTOPRAZOLE SODIUM 40 MG: 40 INJECTION, POWDER, FOR SOLUTION INTRAVENOUS at 21:50

## 2024-12-03 RX ADMIN — SODIUM CHLORIDE SOLN NEBU 3% 4 ML: 3 NEBU SOLN at 13:51

## 2024-12-03 RX ADMIN — VALPROIC ACID 500 MG: 500 SOLUTION ORAL at 01:26

## 2024-12-03 RX ADMIN — ATORVASTATIN CALCIUM 10 MG: 10 TABLET, FILM COATED ORAL at 18:12

## 2024-12-03 RX ADMIN — LEVALBUTEROL HYDROCHLORIDE 1.25 MG: 1.25 SOLUTION RESPIRATORY (INHALATION) at 13:51

## 2024-12-03 RX ADMIN — LEVETIRACETAM 750 MG: 750 TABLET, FILM COATED ORAL at 21:50

## 2024-12-03 RX ADMIN — VALPROIC ACID 500 MG: 500 SOLUTION ORAL at 13:30

## 2024-12-03 RX ADMIN — PANTOPRAZOLE SODIUM 40 MG: 40 INJECTION, POWDER, FOR SOLUTION INTRAVENOUS at 09:08

## 2024-12-03 RX ADMIN — SERTRALINE HYDROCHLORIDE 50 MG: 50 TABLET ORAL at 09:11

## 2024-12-03 RX ADMIN — PROPRANOLOL HYDROCHLORIDE 20 MG: 20 SOLUTION ORAL at 21:50

## 2024-12-03 RX ADMIN — SODIUM CHLORIDE SOLN NEBU 3% 4 ML: 3 NEBU SOLN at 19:30

## 2024-12-03 RX ADMIN — MICONAZOLE NITRATE: 20 CREAM TOPICAL at 17:46

## 2024-12-03 RX ADMIN — LEVALBUTEROL HYDROCHLORIDE 1.25 MG: 1.25 SOLUTION RESPIRATORY (INHALATION) at 07:55

## 2024-12-03 RX ADMIN — VALPROIC ACID 500 MG: 500 SOLUTION ORAL at 18:06

## 2024-12-03 RX ADMIN — HEPARIN SODIUM 13 UNITS/KG/HR: 10000 INJECTION, SOLUTION INTRAVENOUS at 09:05

## 2024-12-03 RX ADMIN — LEVETIRACETAM 750 MG: 750 TABLET, FILM COATED ORAL at 09:11

## 2024-12-03 RX ADMIN — LEVOTHYROXINE SODIUM 100 MCG: 100 TABLET ORAL at 05:01

## 2024-12-03 RX ADMIN — SODIUM CHLORIDE SOLN NEBU 3% 4 ML: 3 NEBU SOLN at 07:55

## 2024-12-03 RX ADMIN — MICONAZOLE NITRATE: 20 CREAM TOPICAL at 09:07

## 2024-12-03 RX ADMIN — VALPROIC ACID 500 MG: 500 SOLUTION ORAL at 05:02

## 2024-12-03 NOTE — ASSESSMENT & PLAN NOTE
Unclear etiology.  Possibly a volume issue.  Consideration for the possibility of leg infection or bleeding with hematoma.  CT scan without new source of infection appears more consistent with evolving hematomas.  -Follow-up blood cultures  -Discontinue antibiotics  -Additional workup as above

## 2024-12-03 NOTE — PLAN OF CARE
Problem: PAIN - ADULT  Goal: Verbalizes/displays adequate comfort level or baseline comfort level  Description: Interventions:  - Encourage patient to monitor pain and request assistance  - Assess pain using appropriate pain scale  - Administer analgesics based on type and severity of pain and evaluate response  - Implement non-pharmacological measures as appropriate and evaluate response  - Consider cultural and social influences on pain and pain management  - Notify physician/advanced practitioner if interventions unsuccessful or patient reports new pain  Outcome: Progressing     Problem: INFECTION - ADULT  Goal: Absence or prevention of progression during hospitalization  Description: INTERVENTIONS:  - Assess and monitor for signs and symptoms of infection  - Monitor lab/diagnostic results  - Monitor all insertion sites, i.e. indwelling lines, tubes, and drains  - Monitor endotracheal if appropriate and nasal secretions for changes in amount and color  - [place] appropriate cooling/warming therapies per order  - Administer medications as ordered  - Instruct and encourage patient and family to use good hand hygiene technique  - Identify and instruct in appropriate isolation precautions for identified infection/condition  Outcome: Progressing  Goal: Absence of fever/infection during neutropenic period  Description: INTERVENTIONS:  - Monitor WBC    Outcome: Progressing     Problem: SAFETY ADULT  Goal: Patient will remain free of falls  Description: INTERVENTIONS:  - Educate patient/family on patient safety including physical limitations  - Instruct patient to call for assistance with activity   - Consult OT/PT to assist with strengthening/mobility   - Keep Call bell within reach  - Keep bed low and locked with side rails adjusted as appropriate  - Keep care items and personal belongings within reach  - Initiate and maintain comfort rounds  - Make Fall Risk Sign visible to staff  - Offer Toileting every 2 Hours,  in advance of need  - Initiate/Maintain bed alarm  - Obtain necessary fall risk management equipment  - Apply yellow socks and bracelet for high fall risk patients  - Consider moving patient to room near nurses station  Outcome: Progressing  Goal: Maintain or return to baseline ADL function  Description: INTERVENTIONS:  -  Assess patient's ability to carry out ADLs; assess patient's baseline for ADL function and identify physical deficits which impact ability to perform ADLs (bathing, care of mouth/teeth, toileting, grooming, dressing, etc.)  - Assess/evaluate cause of self-care deficits   - Assess range of motion  - Assess patient's mobility; develop plan if impaired  - Assess patient's need for assistive devices and provide as appropriate  - Encourage maximum independence but intervene and supervise when necessary  - Involve family in performance of ADLs  - Assess for home care needs following discharge   - Consider OT consult to assist with ADL evaluation and planning for discharge  - Provide patient education as appropriate  Outcome: Progressing  Goal: Maintains/Returns to pre admission functional level  Description: INTERVENTIONS:  - Perform AM-PAC 6 Click Basic Mobility/ Daily Activity assessment daily.  - Set and communicate daily mobility goal to care team and patient/family/caregiver.   - Collaborate with rehabilitation services on mobility goals if consulted  - Perform Range of Motion 3 times a day.  - Reposition patient every 2 hours.  - Dangle patient 3 times a day  - Stand patient 3 times a day  - Ambulate patient 3 times a day  - Out of bed to chair 3 times a day   - Out of bed for meals 3 times a day  - Out of bed for toileting  - Record patient progress and toleration of activity level   Outcome: Progressing     Problem: DISCHARGE PLANNING  Goal: Discharge to home or other facility with appropriate resources  Description: INTERVENTIONS:  - Identify barriers to discharge w/patient and caregiver  -  Arrange for needed discharge resources and transportation as appropriate  - Identify discharge learning needs (meds, wound care, etc.)  - Arrange for interpretive services to assist at discharge as needed  - Refer to Case Management Department for coordinating discharge planning if the patient needs post-hospital services based on physician/advanced practitioner order or complex needs related to functional status, cognitive ability, or social support system  Outcome: Progressing     Problem: Knowledge Deficit  Goal: Patient/family/caregiver demonstrates understanding of disease process, treatment plan, medications, and discharge instructions  Description: Complete learning assessment and assess knowledge base.  Interventions:  - Provide teaching at level of understanding  - Provide teaching via preferred learning methods  Outcome: Progressing     Problem: NEUROSENSORY - ADULT  Goal: Achieves stable or improved neurological status  Description: INTERVENTIONS  - Monitor and report changes in neurological status  - Monitor vital signs such as temperature, blood pressure, glucose, and any other labs ordered   - Initiate measures to prevent increased intracranial pressure  - Monitor for seizure activity and implement precautions if appropriate      Outcome: Progressing  Goal: Remains free of injury related to seizures activity  Description: INTERVENTIONS  - Maintain airway, patient safety  and administer oxygen as ordered  - Monitor patient for seizure activity, document and report duration and description of seizure to physician/advanced practitioner  - If seizure occurs,  ensure patient safety during seizure  - Reorient patient post seizure  - Seizure pads on all 4 side rails  - Instruct patient/family to notify RN of any seizure activity including if an aura is experienced  - Instruct patient/family to call for assistance with activity based on nursing assessment  - Administer anti-seizure medications if  ordered    Outcome: Progressing  Goal: Achieves maximal functionality and self care  Description: INTERVENTIONS  - Monitor swallowing and airway patency with patient fatigue and changes in neurological status  - Encourage and assist patient to increase activity and self care.   - Encourage visually impaired, hearing impaired and aphasic patients to use assistive/communication devices  Outcome: Progressing     Problem: CARDIOVASCULAR - ADULT  Goal: Maintains optimal cardiac output and hemodynamic stability  Description: INTERVENTIONS:  - Monitor I/O, vital signs and rhythm  - Monitor for S/S and trends of decreased cardiac output  - Administer and titrate ordered vasoactive medications to optimize hemodynamic stability  - Assess quality of pulses, skin color and temperature  - Assess for signs of decreased coronary artery perfusion  - Instruct patient to report change in severity of symptoms  Outcome: Progressing  Goal: Absence of cardiac dysrhythmias or at baseline rhythm  Description: INTERVENTIONS:  - Continuous cardiac monitoring, vital signs, obtain 12 lead EKG if ordered  - Administer antiarrhythmic and heart rate control medications as ordered  - Monitor electrolytes and administer replacement therapy as ordered  Outcome: Progressing     Problem: RESPIRATORY - ADULT  Goal: Achieves optimal ventilation and oxygenation  Description: INTERVENTIONS:  - Assess for changes in respiratory status  - Assess for changes in mentation and behavior  - Position to facilitate oxygenation and minimize respiratory effort  - Oxygen administered by appropriate delivery if ordered  - Initiate smoking cessation education as indicated  - Encourage broncho-pulmonary hygiene including cough, deep breathe, Incentive Spirometry  - Assess the need for suctioning and aspirate as needed  - Assess and instruct to report SOB or any respiratory difficulty  - Respiratory Therapy support as indicated  Outcome: Progressing     Problem:  GASTROINTESTINAL - ADULT  Goal: Minimal or absence of nausea and/or vomiting  Description: INTERVENTIONS:  - Administer IV fluids if ordered to ensure adequate hydration  - Maintain NPO status until nausea and vomiting are resolved  - Nasogastric tube if ordered  - Administer ordered antiemetic medications as needed  - Provide nonpharmacologic comfort measures as appropriate  - Advance diet as tolerated, if ordered  - Consider nutrition services referral to assist patient with adequate nutrition and appropriate food choices  Outcome: Progressing  Goal: Maintains or returns to baseline bowel function  Description: INTERVENTIONS:  - Assess bowel function  - Encourage oral fluids to ensure adequate hydration  - Administer IV fluids if ordered to ensure adequate hydration  - Administer ordered medications as needed  - Encourage mobilization and activity  - Consider nutritional services referral to assist patient with adequate nutrition and appropriate food choices  Outcome: Progressing  Goal: Maintains adequate nutritional intake  Description: INTERVENTIONS:  - Monitor percentage of each meal consumed  - Identify factors contributing to decreased intake, treat as appropriate  - Assist with meals as needed  - Monitor I&O, weight, and lab values if indicated  - Obtain nutrition services referral as needed  Outcome: Progressing  Goal: Establish and maintain optimal ostomy function  Description: INTERVENTIONS:  - Assess bowel function  - Encourage oral fluids to ensure adequate hydration  - Administer IV fluids if ordered to ensure adequate hydration   - Administer ordered medications as needed  - Encourage mobilization and activity  - Nutrition services referral to assist patient with appropriate food choices  - Assess stoma site  - Consider wound care consult   Outcome: Progressing  Goal: Oral mucous membranes remain intact  Description: INTERVENTIONS  - Assess oral mucosa and hygiene practices  - Implement preventative  oral hygiene regimen  - Implement oral medicated treatments as ordered  - Initiate Nutrition services referral as needed  Outcome: Progressing     Problem: GENITOURINARY - ADULT  Goal: Maintains or returns to baseline urinary function  Description: INTERVENTIONS:  - Assess urinary function  - Encourage oral fluids to ensure adequate hydration if ordered  - Administer IV fluids as ordered to ensure adequate hydration  - Administer ordered medications as needed  - Offer frequent toileting  - Follow urinary retention protocol if ordered  Outcome: Progressing  Goal: Absence of urinary retention  Description: INTERVENTIONS:  - Assess patient’s ability to void and empty bladder  - Monitor I/O  - Bladder scan as needed  - Discuss with physician/AP medications to alleviate retention as needed  - Discuss catheterization for long term situations as appropriate  Outcome: Progressing  Goal: Urinary catheter remains patent  Description: INTERVENTIONS:  - Assess patency of urinary catheter  - If patient has a chronic peace, consider changing catheter if non-functioning  - Follow guidelines for intermittent irrigation of non-functioning urinary catheter  Outcome: Progressing     Problem: METABOLIC, FLUID AND ELECTROLYTES - ADULT  Goal: Electrolytes maintained within normal limits  Description: INTERVENTIONS:  - Monitor labs and assess patient for signs and symptoms of electrolyte imbalances  - Administer electrolyte replacement as ordered  - Monitor response to electrolyte replacements, including repeat lab results as appropriate  - Instruct patient on fluid and nutrition as appropriate  Outcome: Progressing  Goal: Fluid balance maintained  Description: INTERVENTIONS:  - Monitor labs   - Monitor I/O and WT  - Instruct patient on fluid and nutrition as appropriate  - Assess for signs & symptoms of volume excess or deficit  Outcome: Progressing  Goal: Glucose maintained within target range  Description: INTERVENTIONS:  - Monitor  Blood Glucose as ordered  - Assess for signs and symptoms of hyperglycemia and hypoglycemia  - Administer ordered medications to maintain glucose within target range  - Assess nutritional intake and initiate nutrition service referral as needed  Outcome: Progressing     Problem: SKIN/TISSUE INTEGRITY - ADULT  Goal: Skin Integrity remains intact(Skin Breakdown Prevention)  Description: Assess:  -Perform [place] assessment  -Clean and moisturize skin   -Inspect skin when repositioning, toileting, and assisting with ADLS  -Assess under medical devices  -Assess extremities for adequate circulation and sensation     Bed Management:  -Have minimal linens on bed & keep smooth, unwrinkled  -Change linens as needed when moist or perspiring  -Avoid sitting or lying in one position for more than 2 hours while in bed  -Keep HOB at 30degrees     Toileting:  -Offer bedside commode  -Assess for incontinence  -Use incontinent care products after each incontinent episode    Activity:  -Mobilize patient 3 times a day  -Encourage activity and walks on unit  -Encourage or provide ROM exercises   -Turn and reposition patient every 2 Hours  -Use appropriate equipment to lift or move patient in bed  -Instruct/ Assist with weight shifting when out of bed in chair  -Consider limitation of chair time 3 hour intervals    Skin Care:  -Avoid use of baby powder, tape, friction and shearing, hot water or constrictive clothing  -Relieve pressure over bony prominences   -Do not massage red bony areas    Next Steps:  -Teach patient strategies to minimize risks    -Consider consults to  interdisciplinary teams   Outcome: Progressing  Goal: Incision(s), wounds(s) or drain site(s) healing without S/S of infection  Description: INTERVENTIONS  - Assess and document dressing, incision, wound bed, drain sites and surrounding tissue  - Provide patient and family education  - Perform skin care/dressing changes   Outcome: Progressing  Goal: Pressure injury  heals and does not worsen  Description: Interventions:  - Implement low air loss mattress or specialty surface (Criteria met)  - Apply silicone foam dressing  - Instruct/assist with weight shifting every 60 minutes when in chair   - Limit chair time to 3 hour intervals  - Use special pressure reducing interventions when in chair   - Apply fecal or urinary incontinence containment device   - Perform passive or active ROM   - Turn and reposition patient & offload bony prominences every 3 hours   - Utilize friction reducing device or surface for transfers   - Consider consults to  interdisciplinary teams   - Use incontinent care products after each incontinent episode   - Consider nutrition services referral as needed  Outcome: Progressing     Problem: HEMATOLOGIC - ADULT  Goal: Maintains hematologic stability  Description: INTERVENTIONS  - Assess for signs and symptoms of bleeding or hemorrhage  - Monitor labs  - Administer supportive blood products/factors as ordered and appropriate  Outcome: Progressing     Problem: MUSCULOSKELETAL - ADULT  Goal: Maintain or return mobility to safest level of function  Description: INTERVENTIONS:  - Assess patient's ability to carry out ADLs; assess patient's baseline for ADL function and identify physical deficits which impact ability to perform ADLs (bathing, care of mouth/teeth, toileting, grooming, dressing, etc.)  - Assess/evaluate cause of self-care deficits   - Assess range of motion  - Assess patient's mobility  - Assess patient's need for assistive devices and provide as appropriate  - Encourage maximum independence but intervene and supervise when necessary  - Involve family in performance of ADLs  - Assess for home care needs following discharge   - Consider OT consult to assist with ADL evaluation and planning for discharge  - Provide patient education as appropriate  Outcome: Progressing  Goal: Maintain proper alignment of affected body part  Description:  INTERVENTIONS:  - Support, maintain and protect limb and body alignment  - Provide patient/ family with appropriate education  Outcome: Progressing     Problem: Nutrition/Hydration-ADULT  Goal: Nutrient/Hydration intake appropriate for improving, restoring or maintaining nutritional needs  Description: Monitor and assess patient's nutrition/hydration status for malnutrition. Collaborate with interdisciplinary team and initiate plan and interventions as ordered.  Monitor patient's weight and dietary intake as ordered or per policy. Utilize nutrition screening tool and intervene as necessary. Determine patient's food preferences and provide high-protein, high-caloric foods as appropriate.     INTERVENTIONS:  - Monitor oral intake, urinary output, labs, and treatment plans  - Assess nutrition and hydration status and recommend course of action  - Evaluate amount of meals eaten  - Assist patient with eating if necessary   - Allow adequate time for meals  - Recommend/ encourage appropriate diets, oral nutritional supplements, and vitamin/mineral supplements  - Order, calculate, and assess calorie counts as needed  - Recommend, monitor, and adjust tube feedings and TPN/PPN based on assessed needs  - Assess need for intravenous fluids  - Provide specific nutrition/hydration education as appropriate  - Include patient/family/caregiver in decisions related to nutrition  Outcome: Progressing     Problem: Prexisting or High Potential for Compromised Skin Integrity  Goal: Skin integrity is maintained or improved  Description: INTERVENTIONS:  - Identify patients at risk for skin breakdown  - Assess and monitor skin integrity  - Assess and monitor nutrition and hydration status  - Monitor labs   - Assess for incontinence   - Turn and reposition patient  - Assist with mobility/ambulation  - Relieve pressure over bony prominences  - Avoid friction and shearing  - Provide appropriate hygiene as needed including keeping skin clean  and dry  - Evaluate need for skin moisturizer/barrier cream  - Collaborate with interdisciplinary team   - Patient/family teaching  - Consider wound care consult   Outcome: Progressing

## 2024-12-03 NOTE — ASSESSMENT & PLAN NOTE
Patient intubated for airway protection.  Status post bronchoscopy 11/13 for mucous plugging and thick left-sided secretions seen.  Chest x-ray not consistent with pneumonia.  BAL culture shows growth of mixed respiratory viktoriya.  Chest x-ray with bilateral airspace opacities but no clinical evidence of pneumonia, patient has been on stable ventilator settings without significant secretions.  No clinical evidence of pneumonia at this time.  Repeat chest x-ray with some left basilar consolidation consistent with atelectasis and possible effusion.  Now extubated on oxygen by nasal cannula   -No additional antibiotics indicated.  -Wean off oxygen as able

## 2024-12-03 NOTE — RESTORATIVE TECHNICIAN NOTE
Restorative Technician Note      Patient Name: Adelina Soto     Note Type: Mobility  Patient Position Upon Consult: Supine  Activity Performed: Repositioned  Patient Position at End of Consult: Supine; All needs within reach; Bed/Chair alarm activated      Taurus HENDERSON, Restorative Technician,

## 2024-12-03 NOTE — WOUND OSTOMY CARE
Progress Note - Wound   Adelina Soto 64 y.o. female MRN: [id number]  Unit/Bed#: [facility] 708-01 Encounter: [id number]        Assessment:   Patient is seen for wound care follow-up. Seen lying on regular mattress. Kreg low air loss mattress ordered for patient. Recommend use of ATR. Mod/max assist for turning and repositioning. Incontinent of bowel and bladder. BMI of 45.71 with significant body folds.     Findings:  B/L heels are dry intact and jose with no skin loss or wounds present. Recommend preventative Hydraguard Cream and proper offloading/ repositioning.  LDA was in place for left arm pressure injury- arm is intact with no skin loss or wounds present.     Right Breast Fold MASD: resolved area is now intact with pink epithelial tissue. No skin loss or drainage noted. Recommend calcium alginate rope for area.     Right Face: picture was placed in media on 11/23- wound now appears resolved. Area now with intact, dry brown well adhered scab. Recommend preventative foam dressing to area as patient wears over the ear/face oxygen tubing     HA B/L Sacro-Buttocks Unstageable Pressure Injury: likely pressure moisture in etiology with fungal component. Scattered areas of full thickness skin loss measured together. Wound beds with yellow slough tissue. Azalia-wounds are fragile, hyperpigmented, and rashy. Scant serosanguineous drainage noted. Recommend continuing with with triad paste and EARLINE.    Right Anterior thigh: with intact, pink, hyperpigmented tissue. No skin loss or drainage noted. Recommend leaving area FRANK.     Right Upper Back Wound: unclear etiology. 2 Irregular in shape intact fluid filled blistered areas measured together. Wound beds are pink/yellow  in color. Azalia-wounds are fragile. No drainage noted. Recommend a foam dressing to the area.     Right Back Fold MASD/ITD: oval in shape area of partial thickness skin loss with a pink and yellow in color wound bed located in deep wound fold. Cannot fully  rule out pressure component. Azalia-wound is fragile. Scant to small serosanguineous drainage noted. Recommend calcium alginate rope    Anterior Abdomen/Pannus MASD/ITD: linear in shape area of full thickness skin loss located in deep skin fold. Wound bed with mix of red and yellow slough tissue. Small serosanguineous drainage noted. Recommend calcium alginate rope.          No induration, fluctuance, odor, warmth/temperature differences, redness, or purulence noted to the above noted wounds and skin areas assessed. New dressings applied per orders listed below. Patient tolerated well- no s/s of non-verbal pain or discomfort observed during the encounter. Bedside nurse aware of plan of care. See flow sheets for more detailed assessment findings.      Orders listed below and wound care will continue to follow, call or Secure Chat Message with questions.     Skin Care Plan:  1-Cleanse B/L Sacro-Buttocks and Anterior and Posterior Upper Thighs with soap and water. Apply Triad Paste to open aspects of sacro-buttocks. Apply EARLINE Anti-Fungal Cream to Intact aspects of B/L Sacro-Buttocks and posterior thighs BID and PRN episodes of incontinence  2-Turn/reposition q2h or when medically stable for pressure re-distribution on skin. Utilize ATR turning and repositioning system for patient   3-Elevate heels to offload pressure. Apply offloading boots to b/l feet.   4-Moisturize skin daily with skin nourishing cream  5-Ehob cushion in chair when out of bed.  6-Preventative Hydraguard to bilateral heels BID and PRN.   7-B/L Abdomen/Pannus, Breast Folds, and Right Back Fold Wounds: cleanse with soap and water and pat dry. Apply calcium alginate rope (Melgisorb Plus Cavity) to skin fold. Change daily or PRN soilage/displacement.   8-Kreg Low Air Loss Mattress ordered for patient   9-Right Upper Back Blisters: Apply a silicone bordered foam dressing to blistered areas. Vinay with T for Treatment and change every other day or PRN.    10-Apply a preventative 2x2 silicone bordered foam dressing to right cheek. Vinay with P for Prevention and change every 3 days or PRN.       WOUNDS:  Wound 12/11/22 MASD Pelvis Anterior;Left (Active)   Wound Image   12/03/24 0944   Wound Description Yellow;Slough;Beefy red 12/03/24 0944   Azalia-wound Assessment Fragile 12/03/24 0944   Wound Length (cm) 0.5 cm 12/03/24 0944   Wound Width (cm) 8 cm 12/03/24 0944   Wound Depth (cm) 0.2 cm 12/03/24 0944   Wound Surface Area (cm^2) 4 cm^2 12/03/24 0944   Wound Volume (cm^3) 0.8 cm^3 12/03/24 0944   Calculated Wound Volume (cm^3) 0.8 cm^3 12/03/24 0944   Change in Wound Size % 90.48 12/03/24 0944   Wound Site Closure JUVENTINO 12/03/24 0944   Drainage Amount Small 12/03/24 0944   Drainage Description Serosanguineous 12/03/24 0944   Non-staged Wound Description Full thickness 12/03/24 0944   Treatments Cleansed;Irrigation with NSS;Site care 12/03/24 0944   Dressing Calcium Alginate 12/03/24 0944   Wound packed? No 12/03/24 0944   Packing- # removed 0 12/03/24 0944   Packing- # inserted 0 12/03/24 0944   Dressing Changed Changed 12/03/24 0944   Patient Tolerance Tolerated well 12/03/24 0944   Dressing Status Dry;Intact;Clean 12/03/24 0944       Wound 11/19/24 Pressure Injury Buttocks (Active)   Wound Image   12/03/24 0938   Wound Description Yellow;Slough 12/03/24 0938   Pressure Injury Stage U 12/03/24 0938   Azalia-wound Assessment Fragile;Pink;Rash 12/03/24 0938   Wound Length (cm) 14 cm 12/03/24 0938   Wound Width (cm) 9.5 cm 12/03/24 0938   Wound Depth (cm) 0.2 cm 12/03/24 0938   Wound Surface Area (cm^2) 133 cm^2 12/03/24 0938   Wound Volume (cm^3) 26.6 cm^3 12/03/24 0938   Calculated Wound Volume (cm^3) 26.6 cm^3 12/03/24 0938   Change in Wound Size % -168.69 12/03/24 0938   Wound Site Closure JUVENTINO 12/03/24 0938   Drainage Amount Scant 12/03/24 0938   Drainage Description Serosanguineous 12/03/24 0938   Non-staged Wound Description Full thickness 12/03/24 0938   Treatments  Cleansed;Irrigation with NSS;Site care 12/03/24 0938   Dressing Other (Comment) 12/03/24 0938   Wound packed? No 12/03/24 0938   Packing- # removed 0 12/03/24 0938   Packing- # inserted 0 12/03/24 0938   Dressing Changed Changed 12/03/24 0938   Patient Tolerance Tolerated well 12/03/24 0938   Dressing Status Intact;Dry;Clean 12/03/24 0938       Wound 11/25/24 Thigh Anterior;Right (Active)   Wound Image   12/03/24 0944   Wound Description Intact 12/03/24 0944   Azalia-wound Assessment Intact 12/03/24 0944   Wound Length (cm) 0 cm 12/03/24 0944   Wound Width (cm) 0 cm 12/03/24 0944   Wound Depth (cm) 0 cm 12/03/24 0944   Wound Surface Area (cm^2) 0 cm^2 12/03/24 0944   Wound Volume (cm^3) 0 cm^3 12/03/24 0944   Calculated Wound Volume (cm^3) 0 cm^3 12/03/24 0944   Change in Wound Size % 100 12/03/24 0944   Wound Site Closure JUVENTINO 12/03/24 0944   Drainage Amount None 12/03/24 0944   Non-staged Wound Description Not applicable 12/03/24 0944   Treatments Cleansed;Site care 12/03/24 0944   Dressing Moisture barrier 12/03/24 0944   Wound packed? No 12/03/24 0944   Packing- # removed 0 12/03/24 0944   Packing- # inserted 0 12/03/24 0944   Dressing Changed Changed 12/03/24 0944   Patient Tolerance Tolerated well 12/03/24 0944   Dressing Status Intact 12/03/24 0944       Wound 11/30/24 Pressure Injury Arm Anterior;Left;Lower (Active)   Wound Image   12/03/24 0945   Wound Description Intact 12/03/24 0945   Azalia-wound Assessment Intact 12/03/24 0945   Wound Length (cm) 0 cm 12/03/24 0945   Wound Width (cm) 0 cm 12/03/24 0945   Wound Depth (cm) 0 cm 12/03/24 0945   Wound Surface Area (cm^2) 0 cm^2 12/03/24 0945   Wound Volume (cm^3) 0 cm^3 12/03/24 0945   Calculated Wound Volume (cm^3) 0 cm^3 12/03/24 0945   Dressing Open to air 12/03/24 0945       Wound 12/03/24 Back Right (Active)   Wound Image   12/03/24 0939   Wound Description Yellow;Pink 12/03/24 1500   Azalia-wound Assessment Fragile 12/03/24 1500   Wound Length (cm) 0.5 cm  12/03/24 0939   Wound Width (cm) 1 cm 12/03/24 0939   Wound Depth (cm) 0.1 cm 12/03/24 0939   Wound Surface Area (cm^2) 0.5 cm^2 12/03/24 0939   Wound Volume (cm^3) 0.05 cm^3 12/03/24 0939   Calculated Wound Volume (cm^3) 0.05 cm^3 12/03/24 0939   Drainage Amount Small 12/03/24 1500   Drainage Description Serosanguineous 12/03/24 1500   Non-staged Wound Description Partial thickness 12/03/24 1500   Treatments Cleansed;Irrigation with NSS;Site care 12/03/24 1500   Dressing Calcium Alginate 12/03/24 1500   Dressing Changed New 12/03/24 1500   Patient Tolerance Tolerated well 12/03/24 1500   Dressing Status Clean;Intact;Dry 12/03/24 1500       Wound 12/03/24 Back Medial;Right;Upper (Active)   Wound Image   12/03/24 0940   Wound Description Intact;Pink 12/03/24 1500   Azalia-wound Assessment Fragile 12/03/24 1500   Wound Length (cm) 0.8 cm 12/03/24 0940   Wound Width (cm) 7 cm 12/03/24 0940   Wound Depth (cm) 0 cm 12/03/24 0940   Wound Surface Area (cm^2) 5.6 cm^2 12/03/24 0940   Wound Volume (cm^3) 0 cm^3 12/03/24 0940   Calculated Wound Volume (cm^3) 0 cm^3 12/03/24 0940   Drainage Amount None 12/03/24 1500   Non-staged Wound Description Not applicable 12/03/24 1500   Treatments Cleansed;Site care 12/03/24 1500   Dressing Foam, Silicon (eg. Allevyn, etc) 12/03/24 1500   Dressing Changed New 12/03/24 1500   Patient Tolerance Tolerated well 12/03/24 1500   Dressing Status Dry;Clean;Intact 12/03/24 1500                Rahel Beatty RN, BSN, CWOCN

## 2024-12-03 NOTE — CONSULTS
The patient's vancomycin therapy has been completed/discontinued. Thank you for this consult. Pharmacy will sign off now.   Romana Almonte, Pharmacist.

## 2024-12-03 NOTE — ASSESSMENT & PLAN NOTE
Possible thigh hematoma versus abscess.  With hypotension consideration for the possibility of infection or bleeding.  CT scan appears more consistent with a hematoma rather than abscess  -Discontinue intravenous vancomycin  -Monitor closely off all antibiotics  -Serial exams

## 2024-12-03 NOTE — ASSESSMENT & PLAN NOTE
Lab Results   Component Value Date    EGFR 44 12/03/2024    EGFR 43 12/02/2024    EGFR 45 12/01/2024    CREATININE 1.27 12/03/2024    CREATININE 1.30 12/02/2024    CREATININE 1.25 12/01/2024   Baseline creatinine 1.7-1.9.  Creatinine stable, slightly below baseline.  Dose adjust antibiotics as needed for creatinine clearance.  Monitor creatinine daily

## 2024-12-03 NOTE — PROGRESS NOTES
Progress Note - Infectious Disease   Name: Adelina Soto 64 y.o. female I MRN: [id number]  Unit/Bed#: [facility] 708-01 I Date of Admission: 11/12/2024   Date of Service: 12/3/2024 I Hospital Day: 21    Assessment & Plan  Swelling of joint of pelvic region or thigh, left  Possible thigh hematoma versus abscess.  With hypotension consideration for the possibility of infection or bleeding.  CT scan appears more consistent with a hematoma rather than abscess  -Discontinue intravenous vancomycin  -Monitor closely off all antibiotics  -Serial exams  Hypotension  Unclear etiology.  Possibly a volume issue.  Consideration for the possibility of leg infection or bleeding with hematoma.  CT scan without new source of infection appears more consistent with evolving hematomas.  -Follow-up blood cultures  -Discontinue antibiotics  -Additional workup as above  Seizure (HCC)   Patient presented with acute encephalopathy and seizure-like activity.  Video EEG concerning for status epilepticus.  MRI brain without contrast was limited by motion but showed restricted diffusion in the left temporal lobe which could be postictal versus infection.  Status post LP 11/12 and CSF studies unremarkable, not consistent with infection with 2 WBCs.  Protein mildly elevated which could be due to seizure activity itself.  ME panel negative, stand alone HSV PCR also negative.  However with new onset seizures and possible abnormality of the temporal lobe HSV remains a possibility as PCR may be negative and CSF bland and early disease.  CSF culture with no growth.  The patient has grown E. coli and ESBL Proteus in urine culture but in the absence of prior systemic signs of infection or localizing symptoms UTI, doubt this is causing status epilepticus. HIV negative. Fever and leukocytosis improved.  Mental status remains poor.  IR unable to perform repeat LP due to technical difficulty.  MRI brain without evidence of acute infarct, intracranial  hemorrhage or mass.  Mental status improving, she is now following commands.  Patient completed 14 days of intravenous acyclovir and is now stable off all antivirals              -No additional IV acyclovir for now  -Antiepileptic drugs as per neurology  -Monitor cognition  Bacteriuria  UA with pyuria and urine culture growing E. coli and ESBL Proteus.  No symptoms of infection prior to admission.  Unlikely cause of status epilepticus however in absence of clear cause of seizure activity treated for possible cystitis with 3 days zosyn   -monitor off antibiotics   Shock ([facility])  May be multifactorial related to possible infection, sedating medications.  Off vasopressor support.  Now with recurrent shock that I suspect is secondary to GI bleeding with notable severe anemia.   -Hemodynamic support per critical care team   -Transfusion support  Acute hypoxic respiratory failure ([facility])  Patient intubated for airway protection.  Status post bronchoscopy 11/13 for mucous plugging and thick left-sided secretions seen.  Chest x-ray not consistent with pneumonia.  BAL culture shows growth of mixed respiratory viktoriya.  Chest x-ray with bilateral airspace opacities but no clinical evidence of pneumonia, patient has been on stable ventilator settings without significant secretions.  No clinical evidence of pneumonia at this time.  Repeat chest x-ray with some left basilar consolidation consistent with atelectasis and possible effusion.  Now extubated on oxygen by nasal cannula   -No additional antibiotics indicated.  -Wean off oxygen as able  Bacteremia due to Staphylococcus  1 of 2 blood cultures collected 11/24 are growing staph epidermidis. Likely contaminant. Monitor off additional antibiotics   Morbid obesity with BMI of 50.0-59.9, adult ([facility])  Affects antimicrobial dosing  CKD (chronic kidney disease)  Lab Results   Component Value Date    EGFR 44 12/03/2024    EGFR 43 12/02/2024    EGFR 45 12/01/2024    CREATININE 1.27  12/03/2024    CREATININE 1.30 12/02/2024    CREATININE 1.25 12/01/2024   Baseline creatinine 1.7-1.9.  Creatinine stable, slightly below baseline.  Dose adjust antibiotics as needed for creatinine clearance.  Monitor creatinine daily  Type 2 diabetes mellitus with hyperglycemia, unspecified whether long term insulin use (HCC)  Lab Results   Component Value Date    HGBA1C 5.7 (H) 11/13/2024   Diabetes well-controlled.  Glycemic management per primary team    I have discussed with primary service the above plan to discontinue the intravenous vancomycin with close observation.  The primary service agrees with the plan.    Antibiotics:  Vancomycin 2    Subjective   Patient has no fever, chills, sweats; no nausea, vomiting, diarrhea; no cough, shortness of breath; no increased pain. No new symptoms.  She is stabilized and moved out of the intensive care unit    Objective :  Temp:  [97.5 °F (36.4 °C)-97.8 °F (36.6 °C)] 97.5 °F (36.4 °C)  HR:  [] 105  BP: ()/(50-84) 102/74  Resp:  [16-24] 16  SpO2:  [89 %-100 %] 95 %    General:  No acute distress  Psychiatric:  Awake and alert  Pulmonary:  Normal respiratory excursion without accessory muscle use  Abdomen:  Soft, nontender  Extremities: Left thigh with swelling and induration with faint erythema/purplish appearance.  Skin:  No rashes      Lab Results: I have reviewed the following results:  Results from last 7 days   Lab Units 12/03/24  0456 12/02/24  0555 12/01/24  0541   WBC Thousand/uL 5.33 4.81 5.81   HEMOGLOBIN g/dL 8.2* 7.8* 8.1*   PLATELETS Thousands/uL 380 355 394*     Results from last 7 days   Lab Units 12/03/24  0456 12/02/24  0555 12/01/24  0541   SODIUM mmol/L 142 143 144   POTASSIUM mmol/L 4.0 4.2 3.8   CHLORIDE mmol/L 103 106 104   CO2 mmol/L 32 32 32   BUN mg/dL 28* 35* 37*   CREATININE mg/dL 1.27 1.30 1.25   EGFR ml/min/1.73sq m 44 43 45   CALCIUM mg/dL 8.8 8.7 8.9     Results from last 7 days   Lab Units 12/02/24  1057 11/29/24  2142   BLOOD  CULTURE  Received in Microbiology Lab. Culture in Progress.  Received in Microbiology Lab. Culture in Progress.  --    GRAM STAIN RESULT   --  No Polys or Bacteria seen   WOUND CULTURE   --  No growth                     Imaging Results Review: I personally reviewed the following image studies in PACS and associated radiology reports: CT left leg. My interpretation of the radiology images/reports is: No well-defined collection to suggest abscess.  Possible hemorrhage in the tissues to suggest hematoma.  CT abdomen pelvis.  Slight interval increase in the size of the left lumbar paraspinal hematoma.

## 2024-12-03 NOTE — SPEECH THERAPY NOTE
Speech Language/Pathology  Pt declined any trials at this time.  States she had some lunch but is not hungry.  Will follow as able.

## 2024-12-04 PROBLEM — R94.31 ABNORMAL EKG: Status: RESOLVED | Noted: 2024-11-15 | Resolved: 2024-12-04

## 2024-12-04 PROBLEM — R57.9 SHOCK (HCC): Status: RESOLVED | Noted: 2024-11-15 | Resolved: 2024-12-04

## 2024-12-04 PROBLEM — J96.21 ACUTE ON CHRONIC HYPOXIC RESPIRATORY FAILURE (HCC): Status: ACTIVE | Noted: 2024-11-15

## 2024-12-04 PROBLEM — I95.9 HYPOTENSION: Status: RESOLVED | Noted: 2024-12-02 | Resolved: 2024-12-04

## 2024-12-04 PROBLEM — S70.12XA THIGH HEMATOMA, LEFT, INITIAL ENCOUNTER: Status: ACTIVE | Noted: 2024-12-02

## 2024-12-04 PROBLEM — R79.89 ELEVATED TROPONIN LEVEL NOT DUE MYOCARDIAL INFARCTION: Status: RESOLVED | Noted: 2024-11-15 | Resolved: 2024-12-04

## 2024-12-04 LAB
ALBUMIN SERPL BCG-MCNC: 2.8 G/DL (ref 3.5–5)
ALP SERPL-CCNC: 58 U/L (ref 34–104)
ALT SERPL W P-5'-P-CCNC: 8 U/L (ref 7–52)
ANION GAP SERPL CALCULATED.3IONS-SCNC: 4 MMOL/L (ref 4–13)
APTT PPP: 85 SECONDS (ref 23–34)
AST SERPL W P-5'-P-CCNC: 12 U/L (ref 13–39)
BILIRUB SERPL-MCNC: 0.3 MG/DL (ref 0.2–1)
BUN SERPL-MCNC: 21 MG/DL (ref 5–25)
CALCIUM ALBUM COR SERPL-MCNC: 9.7 MG/DL (ref 8.3–10.1)
CALCIUM SERPL-MCNC: 8.7 MG/DL (ref 8.4–10.2)
CHLORIDE SERPL-SCNC: 106 MMOL/L (ref 96–108)
CO2 SERPL-SCNC: 33 MMOL/L (ref 21–32)
CREAT SERPL-MCNC: 1.18 MG/DL (ref 0.6–1.3)
ERYTHROCYTE [DISTWIDTH] IN BLOOD BY AUTOMATED COUNT: 22.1 % (ref 11.6–15.1)
GFR SERPL CREATININE-BSD FRML MDRD: 48 ML/MIN/1.73SQ M
GLUCOSE SERPL-MCNC: 88 MG/DL (ref 65–140)
HCT VFR BLD AUTO: 28.3 % (ref 34.8–46.1)
HGB BLD-MCNC: 8.3 G/DL (ref 11.5–15.4)
INR PPP: 1.27 (ref 0.85–1.19)
MCH RBC QN AUTO: 33.5 PG (ref 26.8–34.3)
MCHC RBC AUTO-ENTMCNC: 29.3 G/DL (ref 31.4–37.4)
MCV RBC AUTO: 114 FL (ref 82–98)
PLATELET # BLD AUTO: 328 THOUSANDS/UL (ref 149–390)
PMV BLD AUTO: 9.6 FL (ref 8.9–12.7)
POTASSIUM SERPL-SCNC: 4.1 MMOL/L (ref 3.5–5.3)
PROT SERPL-MCNC: 6.3 G/DL (ref 6.4–8.4)
PROTHROMBIN TIME: 16.2 SECONDS (ref 12.3–15)
RBC # BLD AUTO: 2.48 MILLION/UL (ref 3.81–5.12)
SODIUM SERPL-SCNC: 143 MMOL/L (ref 135–147)
WBC # BLD AUTO: 5.37 THOUSAND/UL (ref 4.31–10.16)

## 2024-12-04 PROCEDURE — 85027 COMPLETE CBC AUTOMATED: CPT

## 2024-12-04 PROCEDURE — 85610 PROTHROMBIN TIME: CPT

## 2024-12-04 PROCEDURE — 85730 THROMBOPLASTIN TIME PARTIAL: CPT

## 2024-12-04 PROCEDURE — 99233 SBSQ HOSP IP/OBS HIGH 50: CPT | Performed by: INTERNAL MEDICINE

## 2024-12-04 PROCEDURE — 94664 DEMO&/EVAL PT USE INHALER: CPT

## 2024-12-04 PROCEDURE — 80053 COMPREHEN METABOLIC PANEL: CPT

## 2024-12-04 PROCEDURE — 94760 N-INVAS EAR/PLS OXIMETRY 1: CPT

## 2024-12-04 PROCEDURE — 94640 AIRWAY INHALATION TREATMENT: CPT

## 2024-12-04 PROCEDURE — 99232 SBSQ HOSP IP/OBS MODERATE 35: CPT

## 2024-12-04 RX ORDER — ATORVASTATIN CALCIUM 10 MG/1
10 TABLET, FILM COATED ORAL
Status: DISCONTINUED | OUTPATIENT
Start: 2024-12-05 | End: 2024-12-09 | Stop reason: HOSPADM

## 2024-12-04 RX ORDER — WARFARIN SODIUM 5 MG/1
5 TABLET ORAL
Status: COMPLETED | OUTPATIENT
Start: 2024-12-04 | End: 2024-12-04

## 2024-12-04 RX ORDER — LEVETIRACETAM 750 MG/1
750 TABLET ORAL EVERY 12 HOURS SCHEDULED
Status: DISCONTINUED | OUTPATIENT
Start: 2024-12-05 | End: 2024-12-09 | Stop reason: HOSPADM

## 2024-12-04 RX ORDER — LANOLIN ALCOHOL/MO/W.PET/CERES
100 CREAM (GRAM) TOPICAL DAILY
Status: DISCONTINUED | OUTPATIENT
Start: 2024-12-05 | End: 2024-12-09 | Stop reason: HOSPADM

## 2024-12-04 RX ADMIN — SODIUM CHLORIDE SOLN NEBU 3% 4 ML: 3 NEBU SOLN at 08:05

## 2024-12-04 RX ADMIN — MICONAZOLE NITRATE: 20 CREAM TOPICAL at 08:39

## 2024-12-04 RX ADMIN — VALPROIC ACID 500 MG: 500 SOLUTION ORAL at 12:29

## 2024-12-04 RX ADMIN — LEVETIRACETAM 750 MG: 750 TABLET, FILM COATED ORAL at 20:46

## 2024-12-04 RX ADMIN — HEPARIN SODIUM 13 UNITS/KG/HR: 10000 INJECTION, SOLUTION INTRAVENOUS at 03:03

## 2024-12-04 RX ADMIN — ATORVASTATIN CALCIUM 10 MG: 10 TABLET, FILM COATED ORAL at 17:03

## 2024-12-04 RX ADMIN — LEVALBUTEROL HYDROCHLORIDE 1.25 MG: 1.25 SOLUTION RESPIRATORY (INHALATION) at 21:17

## 2024-12-04 RX ADMIN — HEPARIN SODIUM 13 UNITS/KG/HR: 10000 INJECTION, SOLUTION INTRAVENOUS at 19:20

## 2024-12-04 RX ADMIN — VALPROIC ACID 500 MG: 500 SOLUTION ORAL at 00:44

## 2024-12-04 RX ADMIN — VALPROIC ACID 500 MG: 500 SOLUTION ORAL at 23:53

## 2024-12-04 RX ADMIN — WARFARIN SODIUM 5 MG: 5 TABLET ORAL at 17:03

## 2024-12-04 RX ADMIN — PROPRANOLOL HYDROCHLORIDE 20 MG: 20 SOLUTION ORAL at 08:38

## 2024-12-04 RX ADMIN — VALPROIC ACID 500 MG: 500 SOLUTION ORAL at 05:04

## 2024-12-04 RX ADMIN — LEVETIRACETAM 750 MG: 750 TABLET, FILM COATED ORAL at 08:37

## 2024-12-04 RX ADMIN — SERTRALINE HYDROCHLORIDE 50 MG: 50 TABLET ORAL at 08:37

## 2024-12-04 RX ADMIN — MICONAZOLE NITRATE: 20 CREAM TOPICAL at 17:03

## 2024-12-04 RX ADMIN — LEVALBUTEROL HYDROCHLORIDE 1.25 MG: 1.25 SOLUTION RESPIRATORY (INHALATION) at 08:04

## 2024-12-04 RX ADMIN — LEVOTHYROXINE SODIUM 88 MCG: 88 TABLET ORAL at 05:04

## 2024-12-04 RX ADMIN — PROPRANOLOL HYDROCHLORIDE 20 MG: 20 SOLUTION ORAL at 20:46

## 2024-12-04 RX ADMIN — PANTOPRAZOLE SODIUM 40 MG: 40 INJECTION, POWDER, FOR SOLUTION INTRAVENOUS at 20:46

## 2024-12-04 RX ADMIN — VALPROIC ACID 500 MG: 500 SOLUTION ORAL at 17:03

## 2024-12-04 RX ADMIN — THIAMINE HCL TAB 100 MG 100 MG: 100 TAB at 08:37

## 2024-12-04 RX ADMIN — SODIUM CHLORIDE SOLN NEBU 3% 4 ML: 3 NEBU SOLN at 21:17

## 2024-12-04 RX ADMIN — LEVALBUTEROL HYDROCHLORIDE 1.25 MG: 1.25 SOLUTION RESPIRATORY (INHALATION) at 12:41

## 2024-12-04 RX ADMIN — LIDOCAINE 5% 2 PATCH: 700 PATCH TOPICAL at 08:37

## 2024-12-04 RX ADMIN — PANTOPRAZOLE SODIUM 40 MG: 40 INJECTION, POWDER, FOR SOLUTION INTRAVENOUS at 08:36

## 2024-12-04 RX ADMIN — SODIUM CHLORIDE SOLN NEBU 3% 4 ML: 3 NEBU SOLN at 12:42

## 2024-12-04 NOTE — ASSESSMENT & PLAN NOTE
Lab Results   Component Value Date    HGBA1C 5.7 (H) 11/13/2024       Recent Labs     12/01/24  0732 12/01/24  1200 12/02/24  0114 12/02/24  1139   POCGLU 87 98 84 91       Blood Sugar Average: Last 72 hrs:  (P) 97.5  Remains well controlled while inpatient  On dental soft/dysphagia 3 diet w/o carb modifier - continue

## 2024-12-04 NOTE — ASSESSMENT & PLAN NOTE
Lab Results   Component Value Date    EGFR 44 12/03/2024    EGFR 43 12/02/2024    EGFR 45 12/01/2024    CREATININE 1.27 12/03/2024    CREATININE 1.30 12/02/2024    CREATININE 1.25 12/01/2024

## 2024-12-04 NOTE — PLAN OF CARE
Problem: PAIN - ADULT  Goal: Verbalizes/displays adequate comfort level or baseline comfort level  Description: Interventions:  - Encourage patient to monitor pain and request assistance  - Assess pain using appropriate pain scale  - Administer analgesics based on type and severity of pain and evaluate response  - Implement non-pharmacological measures as appropriate and evaluate response  - Consider cultural and social influences on pain and pain management  - Notify physician/advanced practitioner if interventions unsuccessful or patient reports new pain  Outcome: Progressing     Problem: INFECTION - ADULT  Goal: Absence or prevention of progression during hospitalization  Description: INTERVENTIONS:  - Assess and monitor for signs and symptoms of infection  - Monitor lab/diagnostic results  - Monitor all insertion sites, i.e. indwelling lines, tubes, and drains  - Monitor endotracheal if appropriate and nasal secretions for changes in amount and color  - [place] appropriate cooling/warming therapies per order  - Administer medications as ordered  - Instruct and encourage patient and family to use good hand hygiene technique  - Identify and instruct in appropriate isolation precautions for identified infection/condition  Outcome: Progressing  Goal: Absence of fever/infection during neutropenic period  Description: INTERVENTIONS:  - Monitor WBC    Outcome: Progressing     Problem: SAFETY ADULT  Goal: Patient will remain free of falls  Description: INTERVENTIONS:  - Educate patient/family on patient safety including physical limitations  - Instruct patient to call for assistance with activity   - Consult OT/PT to assist with strengthening/mobility   - Keep Call bell within reach  - Keep bed low and locked with side rails adjusted as appropriate  - Keep care items and personal belongings within reach  - Initiate and maintain comfort rounds  - Make Fall Risk Sign visible to staff  - Apply yellow socks and bracelet  for high fall risk patients  - Consider moving patient to room near nurses station  Outcome: Progressing  Goal: Maintain or return to baseline ADL function  Description: INTERVENTIONS:  -  Assess patient's ability to carry out ADLs; assess patient's baseline for ADL function and identify physical deficits which impact ability to perform ADLs (bathing, care of mouth/teeth, toileting, grooming, dressing, etc.)  - Assess/evaluate cause of self-care deficits   - Assess range of motion  - Assess patient's mobility; develop plan if impaired  - Assess patient's need for assistive devices and provide as appropriate  - Encourage maximum independence but intervene and supervise when necessary  - Involve family in performance of ADLs  - Assess for home care needs following discharge   - Consider OT consult to assist with ADL evaluation and planning for discharge  - Provide patient education as appropriate  Outcome: Progressing  Goal: Maintains/Returns to pre admission functional level  Description: INTERVENTIONS:  - Perform AM-PAC 6 Click Basic Mobility/ Daily Activity assessment daily.  - Set and communicate daily mobility goal to care team and patient/family/caregiver.   - Collaborate with rehabilitation services on mobility goals if consulted  -- Out of bed for toileting  - Record patient progress and toleration of activity level   Outcome: Progressing     Problem: DISCHARGE PLANNING  Goal: Discharge to home or other facility with appropriate resources  Description: INTERVENTIONS:  - Identify barriers to discharge w/patient and caregiver  - Arrange for needed discharge resources and transportation as appropriate  - Identify discharge learning needs (meds, wound care, etc.)  - Arrange for interpretive services to assist at discharge as needed  - Refer to Case Management Department for coordinating discharge planning if the patient needs post-hospital services based on physician/advanced practitioner order or complex needs  related to functional status, cognitive ability, or social support system  Outcome: Progressing     Problem: Knowledge Deficit  Goal: Patient/family/caregiver demonstrates understanding of disease process, treatment plan, medications, and discharge instructions  Description: Complete learning assessment and assess knowledge base.  Interventions:  - Provide teaching at level of understanding  - Provide teaching via preferred learning methods  Outcome: Progressing     Problem: NEUROSENSORY - ADULT  Goal: Achieves stable or improved neurological status  Description: INTERVENTIONS  - Monitor and report changes in neurological status  - Monitor vital signs such as temperature, blood pressure, glucose, and any other labs ordered   - Initiate measures to prevent increased intracranial pressure  - Monitor for seizure activity and implement precautions if appropriate      Outcome: Progressing  Goal: Remains free of injury related to seizures activity  Description: INTERVENTIONS  - Maintain airway, patient safety  and administer oxygen as ordered  - Monitor patient for seizure activity, document and report duration and description of seizure to physician/advanced practitioner  - If seizure occurs,  ensure patient safety during seizure  - Reorient patient post seizure  - Seizure pads on all 4 side rails  - Instruct patient/family to notify RN of any seizure activity including if an aura is experienced  - Instruct patient/family to call for assistance with activity based on nursing assessment  - Administer anti-seizure medications if ordered    Outcome: Progressing  Goal: Achieves maximal functionality and self care  Description: INTERVENTIONS  - Monitor swallowing and airway patency with patient fatigue and changes in neurological status  - Encourage and assist patient to increase activity and self care.   - Encourage visually impaired, hearing impaired and aphasic patients to use assistive/communication devices  Outcome:  Progressing     Problem: CARDIOVASCULAR - ADULT  Goal: Maintains optimal cardiac output and hemodynamic stability  Description: INTERVENTIONS:  - Monitor I/O, vital signs and rhythm  - Monitor for S/S and trends of decreased cardiac output  - Administer and titrate ordered vasoactive medications to optimize hemodynamic stability  - Assess quality of pulses, skin color and temperature  - Assess for signs of decreased coronary artery perfusion  - Instruct patient to report change in severity of symptoms  Outcome: Progressing  Goal: Absence of cardiac dysrhythmias or at baseline rhythm  Description: INTERVENTIONS:  - Continuous cardiac monitoring, vital signs, obtain 12 lead EKG if ordered  - Administer antiarrhythmic and heart rate control medications as ordered  - Monitor electrolytes and administer replacement therapy as ordered  Outcome: Progressing     Problem: RESPIRATORY - ADULT  Goal: Achieves optimal ventilation and oxygenation  Description: INTERVENTIONS:  - Assess for changes in respiratory status  - Assess for changes in mentation and behavior  - Position to facilitate oxygenation and minimize respiratory effort  - Oxygen administered by appropriate delivery if ordered  - Initiate smoking cessation education as indicated  - Encourage broncho-pulmonary hygiene including cough, deep breathe, Incentive Spirometry  - Assess the need for suctioning and aspirate as needed  - Assess and instruct to report SOB or any respiratory difficulty  - Respiratory Therapy support as indicated  Outcome: Progressing     Problem: GASTROINTESTINAL - ADULT  Goal: Minimal or absence of nausea and/or vomiting  Description: INTERVENTIONS:  - Administer IV fluids if ordered to ensure adequate hydration  - Maintain NPO status until nausea and vomiting are resolved  - Nasogastric tube if ordered  - Administer ordered antiemetic medications as needed  - Provide nonpharmacologic comfort measures as appropriate  - Advance diet as  tolerated, if ordered  - Consider nutrition services referral to assist patient with adequate nutrition and appropriate food choices  Outcome: Progressing  Goal: Maintains or returns to baseline bowel function  Description: INTERVENTIONS:  - Assess bowel function  - Encourage oral fluids to ensure adequate hydration  - Administer IV fluids if ordered to ensure adequate hydration  - Administer ordered medications as needed  - Encourage mobilization and activity  - Consider nutritional services referral to assist patient with adequate nutrition and appropriate food choices  Outcome: Progressing  Goal: Maintains adequate nutritional intake  Description: INTERVENTIONS:  - Monitor percentage of each meal consumed  - Identify factors contributing to decreased intake, treat as appropriate  - Assist with meals as needed  - Monitor I&O, weight, and lab values if indicated  - Obtain nutrition services referral as needed  Outcome: Progressing  Goal: Establish and maintain optimal ostomy function  Description: INTERVENTIONS:  - Assess bowel function  - Encourage oral fluids to ensure adequate hydration  - Administer IV fluids if ordered to ensure adequate hydration   - Administer ordered medications as needed  - Encourage mobilization and activity  - Nutrition services referral to assist patient with appropriate food choices  - Assess stoma site  - Consider wound care consult   Outcome: Progressing  Goal: Oral mucous membranes remain intact  Description: INTERVENTIONS  - Assess oral mucosa and hygiene practices  - Implement preventative oral hygiene regimen  - Implement oral medicated treatments as ordered  - Initiate Nutrition services referral as needed  Outcome: Progressing     Problem: GENITOURINARY - ADULT  Goal: Maintains or returns to baseline urinary function  Description: INTERVENTIONS:  - Assess urinary function  - Encourage oral fluids to ensure adequate hydration if ordered  - Administer IV fluids as ordered to  ensure adequate hydration  - Administer ordered medications as needed  - Offer frequent toileting  - Follow urinary retention protocol if ordered  Outcome: Progressing  Goal: Absence of urinary retention  Description: INTERVENTIONS:  - Assess patient’s ability to void and empty bladder  - Monitor I/O  - Bladder scan as needed  - Discuss with physician/AP medications to alleviate retention as needed  - Discuss catheterization for long term situations as appropriate  Outcome: Progressing  Goal: Urinary catheter remains patent  Description: INTERVENTIONS:  - Assess patency of urinary catheter  - If patient has a chronic peace, consider changing catheter if non-functioning  - Follow guidelines for intermittent irrigation of non-functioning urinary catheter  Outcome: Progressing     Problem: METABOLIC, FLUID AND ELECTROLYTES - ADULT  Goal: Electrolytes maintained within normal limits  Description: INTERVENTIONS:  - Monitor labs and assess patient for signs and symptoms of electrolyte imbalances  - Administer electrolyte replacement as ordered  - Monitor response to electrolyte replacements, including repeat lab results as appropriate  - Instruct patient on fluid and nutrition as appropriate  Outcome: Progressing  Goal: Fluid balance maintained  Description: INTERVENTIONS:  - Monitor labs   - Monitor I/O and WT  - Instruct patient on fluid and nutrition as appropriate  - Assess for signs & symptoms of volume excess or deficit  Outcome: Progressing  Goal: Glucose maintained within target range  Description: INTERVENTIONS:  - Monitor Blood Glucose as ordered  - Assess for signs and symptoms of hyperglycemia and hypoglycemia  - Administer ordered medications to maintain glucose within target range  - Assess nutritional intake and initiate nutrition service referral as needed  Outcome: Progressing     Problem: SKIN/TISSUE INTEGRITY - ADULT  Goal: Skin Integrity remains intact(Skin Breakdown Prevention)  Description:  Assess:  --Assess extremities for adequate circulation and sensation     Bed Management:  -Have minimal linens on bed & keep smooth, unwrinkled  -Change linens as needed when moist or perspiring  -  Activity:  -Encourage activity and walks on unit  -Encourage or provide ROM exercises   -Use appropriate equipment to lift or move patient in bed  -    Skin Care:  -Avoid use of baby powder, tape, friction and shearing, hot water or constrictive clothing  --Outcome: Progressing  Goal: Incision(s), wounds(s) or drain site(s) healing without S/S of infection  Description: INTERVENTIONS  - Assess and document dressing, incision, wound bed, drain sites and surrounding tissue  - POutcome: Progressing  Goal: Pressure injury heals and does not worsen  Description: Interventions:  - Implement low air loss mattress or specialty surface (Criteria met)  - Apply silicone foam dressing  - - Consider nutrition services referral as needed  Outcome: Progressing     Problem: HEMATOLOGIC - ADULT  Goal: Maintains hematologic stability  Description: INTERVENTIONS  - Assess for signs and symptoms of bleeding or hemorrhage  - Monitor labs  - Administer supportive blood products/factors as ordered and appropriate  Outcome: Progressing     Problem: MUSCULOSKELETAL - ADULT  Goal: Maintain or return mobility to safest level of function  Description: INTERVENTIONS:  - Assess patient's ability to carry out ADLs; assess patient's baseline for ADL function and identify physical deficits which impact ability to perform ADLs (bathing, care of mouth/teeth, toileting, grooming, dressing, etc.)  - Assess/evaluate cause of self-care deficits   - Assess range of motion  - Assess patient's mobility  - Assess patient's need for assistive devices and provide as appropriate  - Encourage maximum independence but intervene and supervise when necessary  - Involve family in performance of ADLs  - Assess for home care needs following discharge   - Consider OT  consult to assist with ADL evaluation and planning for discharge  - Provide patient education as appropriate  Outcome: Progressing  Goal: Maintain proper alignment of affected body part  Description: INTERVENTIONS:  - Support, maintain and protect limb and body alignment  - Provide patient/ family with appropriate education  Outcome: Progressing     Problem: Nutrition/Hydration-ADULT  Goal: Nutrient/Hydration intake appropriate for improving, restoring or maintaining nutritional needs  Description: Monitor and assess patient's nutrition/hydration status for malnutrition. Collaborate with interdisciplinary team and initiate plan and interventions as ordered.  Monitor patient's weight and dietary intake as ordered or per policy. Utilize nutrition screening tool and intervene as necessary. Determine patient's food preferences and provide high-protein, high-caloric foods as appropriate.     INTERVENTIONS:  - Monitor oral intake, urinary output, labs, and treatment plans  - Assess nutrition and hydration status and recommend course of action  - Evaluate amount of meals eaten  - Assist patient with eating if necessary   - Allow adequate time for meals  - Recommend/ encourage appropriate diets, oral nutritional supplements, and vitamin/mineral supplements  - Order, calculate, and assess calorie counts as needed  - Recommend, monitor, and adjust tube feedings and TPN/PPN based on assessed needs  - Assess need for intravenous fluids  - Provide specific nutrition/hydration education as appropriate  - Include patient/family/caregiver in decisions related to nutrition  Outcome: Progressing     Problem: Prexisting or High Potential for Compromised Skin Integrity  Goal: Skin integrity is maintained or improved  Description: INTERVENTIONS:  - Identify patients at risk for skin breakdown  - Assess and monitor skin integrity  - Assess and monitor nutrition and hydration status  - Monitor labs   - Assess for incontinence   - Turn and  reposition patient  - Assist with mobility/ambulation  - Relieve pressure over bony prominences  - Avoid friction and shearing  - Provide appropriate hygiene as needed including keeping skin clean and dry  - Evaluate need for skin moisturizer/barrier cream  - Collaborate with interdisciplinary team   - Patient/family teaching  - Consider wound care consult   Outcome: Progressing

## 2024-12-04 NOTE — ASSESSMENT & PLAN NOTE
Noted on admission, likely due to metabolic stress from seizure, shock/hemodynamic changes. Rate controlled <110 without BB, but has been borderline w rates in 100s more recently.  Start BB - 25 mg BID propanolol (given hyperthyroid labs)

## 2024-12-04 NOTE — ASSESSMENT & PLAN NOTE
CT consistent w hematoma  Was started on IV vancomycin while in ICU - since has been discontinued  Monitor WBC, temps

## 2024-12-04 NOTE — ASSESSMENT & PLAN NOTE
Completed course of zosyn x 3d to cover for urinary infectious source with shock, AMS on presentation   Noted pyruia on admission w (+) Ucx for ESBL Proteus, and E. Coli  No further abx indicated

## 2024-12-04 NOTE — ASSESSMENT & PLAN NOTE
Possible thigh hematoma versus abscess.  With hypotension consideration for the possibility of infection or bleeding.  CT scan appears more consistent with a hematoma rather than abscess.  Stable off all antibiotics  -Monitor closely off all antibiotics  -Serial exams

## 2024-12-04 NOTE — ASSESSMENT & PLAN NOTE
Complicates all facets of care  Contributing to poor functional status  Should be started on GLP1-ag outpatient - defer to PCP

## 2024-12-04 NOTE — ASSESSMENT & PLAN NOTE
Unclear etiology.  Possibly a volume issue.  Consideration for the possibility of leg infection or bleeding with hematoma.  CT scan without new source of infection appears more consistent with evolving hematomas.  Repeat blood cultures remain negative.  Blood pressure is normalized  -Follow-up blood cultures  -Monitor off all antibiotics  -No additional workup for now

## 2024-12-04 NOTE — PROGRESS NOTES
Progress Note - Hospitalist   Name: Adelina Soto 64 y.o. female I MRN: [id number]  Unit/Bed#: [facility] 708-01 I Date of Admission: 11/12/2024   Date of Service: 12/3/2024 I Hospital Day: 21    Assessment & Plan  Seizure (HCC)  Noted on admission, tonic clonic activity by significant other s/p 6 mg IV versed load.  LP unremarkable. ME panel and HSV negative but treated w acyclovir [id number].   CTH, CTA, and MRI unremarkable. PoA wishes for full treatment medically but was agreeable to DNR, yes to intubate.     Plan:  Continue 750 mg BID keppra  Continue valproate 500 mg q6h  PT OT: recommending postacute rehab. She is a 2-3 person max assist and would not be safe to d/c home without significant caregiver support    Dyslipidemia  Continue atorvastatin 10 mg daily  Paroxysmal atrial fibrillation (HCC)  Noted on admission, likely due to metabolic stress from seizure, shock/hemodynamic changes. Rate controlled <110 without BB, but has been borderline w rates in 100s more recently.  Start BB - 25 mg BID propanolol (given hyperthyroid labs)  COPD with asthma (HCC)  Continue xopenex/atrovent   Outpatient PFTs  O2 eval closer to d/c; currently on 4L n/c  Hypothyroid  At home on levothyroxine 100 mcg po qd  Lab Results   Component Value Date    [id number] 0.295 (L) 11/14/2024    FREET4 1.23 (H) 11/14/2024   Labs show slightly hyperthyroid - may be contributing to afib. Will slowly dose decrease to 88 mcg po qd    GERD (gastroesophageal reflux disease)  Continue PPI  Anxiety  Continue zoloft 50 mg daily  Morbid obesity with BMI of 50.0-59.9, adult (HCC)  Complicates all facets of care  Contributing to poor functional status  Should be started on GLP1-ag outpatient - defer to PCP  Depression, recurrent (HCC)  Continue home zoloft  CKD (chronic kidney disease)  Lab Results   Component Value Date    EGFR 44 12/03/2024    EGFR 43 12/02/2024    EGFR 45 12/01/2024    CREATININE 1.27 12/03/2024    CREATININE 1.30 12/02/2024     CREATININE 1.25 12/01/2024     Type 2 diabetes mellitus with hyperglycemia, unspecified whether long term insulin use (HCC)  Lab Results   Component Value Date    HGBA1C 5.7 (H) 11/13/2024       Recent Labs     12/01/24  0732 12/01/24  1200 12/02/24  0114 12/02/24  1139   POCGLU 87 98 84 91       Blood Sugar Average: Last 72 hrs:  (P) 97.5  Remains well controlled while inpatient  On dental soft/dysphagia 3 diet w/o carb modifier - continue  Abnormal EKG  11/24 EKG w afib w RVR  See afib  Elevated troponin level not due myocardial infarction  Due to demand ischemia from seizure and shock - now resolved  Bacteriuria  Completed course of zosyn x 3d to cover for urinary infectious source with shock, AMS on presentation   Noted pyruia on admission w (+) Ucx for ESBL Proteus, and E. 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 Monitor off abx  Was initially intubated for airway protection from seizure.  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    TTE 11/14 showed LVEF 60%, moderate concentric hypertrophy, poor windows/view of RV so difficult to assess RVSP.     Plan:  Outpatient sleep study  Home O2 eval closer to discharge  Monitor off abx    Bacteremia due to Staphylococcus  1/2 Bcx (+) staph epi, likely contaminant - monitor off abx  Swelling of joint of pelvic region or thigh, left  CT consistent w hematoma  Was started on IV vancomycin while in ICU - since has been discontinued  Monitor WBC, temps  Hypotension  Resolved, see shock    VTE Pharmacologic Prophylaxis:   High Risk (Score >/= 5) - Pharmacological DVT Prophylaxis Ordered: heparin drip. Sequential Compression Devices Ordered.    Mobility:    Basic Mobility Inpatient Raw Score: 6  JH-HLM Goal: 2: Bed activities/Dependent transfer  JH-HLM Achieved: 3: Sit at edge of bed  JH-HLM Goal achieved. Continue to encourage appropriate mobility.    Patient Centered Rounds: I performed bedside rounds with nursing staff today.   Discussions with Specialists or Other Care Team Provider: n/a    Education and Discussions with Family / Patient: Patient declined call to .     Current Length of Stay: 21 day(s)  Current Patient Status: Inpatient   Certification Statement: The patient will continue to require additional inpatient hospital stay due to afib management, pending updated PT OT recs for rehab  Discharge Plan: Anticipate discharge in 48-72 hrs to rehab facility.    Code Status: Level 2 - DNAR: but accepts endotracheal intubation    Subjective   Patient states she feels uncomfortable laying on her L side and feels buttock pain from prolonged laying on her back. Denies fevers/chills, chest pain, shortness of breath, heart palpitations, nausea, vomiting, abdominal pain.      Objective :  Temp:  [97.5 °F (36.4 °C)-97.8 °F (36.6 °C)] 97.8 °F (36.6 °C)  HR:  [] 93  BP: ()/(57-74) 120/67  Resp:  [16] 16  SpO2:  [90 %-95 %] 93 %  O2 Device: Nasal cannula    Body mass index is 45.71 kg/m².     Input and Output Summary (last 24 hours):   No intake or output data in the 24 hours ending 12/03/24 2057    Physical Exam  Vitals reviewed.   Constitutional:       General: She is not in acute distress.     Appearance: She is ill-appearing. She is not diaphoretic.   HENT:      Head: Normocephalic and atraumatic.      Mouth/Throat:      Mouth: Mucous membranes are moist.      Pharynx: Oropharynx is clear.   Eyes:      General: No scleral icterus.  Cardiovascular:      Rate and Rhythm: Tachycardia present. Rhythm irregular.      Heart sounds: No murmur heard.     No friction rub. No gallop.   Pulmonary:      Effort: Pulmonary effort is normal. No respiratory  distress.      Comments: Diminished BS b/l  Abdominal:      General: There is no distension.      Palpations: Abdomen is soft.      Tenderness: There is no abdominal tenderness. There is no guarding.   Musculoskeletal:      Right lower leg: No edema.      Left lower leg: No edema.   Skin:     General: Skin is warm and dry.   Psychiatric:         Mood and Affect: Mood normal.           Lines/Drains:  Lines/Drains/Airways       Active Status       Name Placement date Placement time Site Days    External Urinary Catheter 12/01/24  1300  -- 2                            Lab Results: I have reviewed the following results:   Results from last 7 days   Lab Units 12/03/24  0456 12/02/24  0555 12/01/24  0541   WBC Thousand/uL 5.33 4.81 5.81   HEMOGLOBIN g/dL 8.2* 7.8* 8.1*   HEMATOCRIT % 28.4* 27.3* 28.4*   PLATELETS Thousands/uL 380 355 394*   BANDS PCT %  --  4  --    SEGS PCT %  --   --  67   LYMPHO PCT %  --  25 21   MONO PCT %  --  11 10   EOS PCT %  --  2 0     Results from last 7 days   Lab Units 12/03/24  0456   SODIUM mmol/L 142   POTASSIUM mmol/L 4.0   CHLORIDE mmol/L 103   CO2 mmol/L 32   BUN mg/dL 28*   CREATININE mg/dL 1.27   ANION GAP mmol/L 7   CALCIUM mg/dL 8.8   GLUCOSE RANDOM mg/dL 90     Results from last 7 days   Lab Units 12/01/24  1030   INR  1.22*     Results from last 7 days   Lab Units 12/02/24  1139 12/02/24  0114 12/01/24  1200 12/01/24  0732 11/30/24  2352 11/30/24  1704 11/30/24  1114 11/30/24  0620 11/29/24  2352 11/29/24  1710 11/29/24  1145 11/29/24  0552   POC GLUCOSE mg/dl 91 84 98 87 103 95 109 113 115 114 85 95         Results from last 7 days   Lab Units 11/28/24  2141   LACTIC ACID mmol/L 0.8       Recent Cultures (last 7 days):   Results from last 7 days   Lab Units 12/02/24  1057 11/29/24  2142   BLOOD CULTURE  No Growth at 24 hrs.  No Growth at 24 hrs.  --    GRAM STAIN RESULT   --  No Polys or Bacteria seen   WOUND CULTURE   --  No growth       Imaging Results Review: I reviewed  radiology reports from this admission including: CT head.  Other Study Results Review: EKG was reviewed.     Last 24 Hours Medication List:     Current Facility-Administered Medications:     acetaminophen (TYLENOL) oral suspension 650 mg, Q4H PRN    albuterol inhalation solution 2.5 mg, Q6H PRN    atorvastatin (LIPITOR) tablet 10 mg, Daily With Dinner    heparin (porcine) 25,000 units in 0.45% NaCl 250 mL infusion (premix), Titrated, Last Rate: 13 Units/kg/hr (12/03/24 0905)    heparin (porcine) injection 10,000 Units, Q6H PRN    heparin (porcine) injection 5,000 Units, Q6H PRN    levalbuterol (XOPENEX) inhalation solution 1.25 mg, TID    levETIRAcetam (KEPPRA) tablet 750 mg, Q12H LINO    levothyroxine tablet 100 mcg, Early Morning    lidocaine (LIDODERM) 5 % patch 2 patch, Daily    moisture barrier miconazole 2% cream (aka EARLINE MOISTURE BARRIER ANTIFUNGAL CREAM), BID    ondansetron (ZOFRAN) injection 4 mg, Q4H PRN    pantoprazole (PROTONIX) injection 40 mg, Q12H LINO    sertraline (ZOLOFT) tablet 50 mg, Daily    sodium chloride 3 % inhalation solution 4 mL, TID    thiamine tablet 100 mg, Daily    valproic acid (DEPAKENE) oral soln 500 mg, Q6H LINO    Administrative Statements   Today, Patient Was Seen By: Thuy Son  I have spent a total time of 40 minutes in caring for this patient on the day of the visit/encounter including Impressions, Counseling / Coordination of care, Documenting in the medical record, Reviewing / ordering tests, medicine, procedures  , Obtaining or reviewing history  , and Communicating with other healthcare professionals .    **Please Note: This note may have been constructed using a voice recognition system.**

## 2024-12-04 NOTE — ASSESSMENT & PLAN NOTE
At home on levothyroxine 100 mcg po qd  Lab Results   Component Value Date    [id number] 0.295 (L) 11/14/2024    FREET4 1.23 (H) 11/14/2024   Labs show slightly hyperthyroid - may be contributing to afib. Will slowly dose decrease to 88 mcg po qd

## 2024-12-04 NOTE — UTILIZATION REVIEW
Continued Stay Review    Date: 12/4                          Current Patient Class: Inpatient  Level of Care:  11/12 -12/02   Critical   12/02 - current  MS      HPI:64 y.o. female initially admitted on 11/12   after being found by her partner at home with AMS, seizure-like activity. EMS witnessed tonic-clonic seizure-like activity, treated with 6 mg of Versed. 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 Was subsequently intubated for airway protection and transferred to [facility]. Initial LP generally unremarkable apart from RBC's 300's from otherwise atraumatic tap, negative ME panel and HSV. 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     Transferred to MICU on 11/17, during evaluation pressors were weaned and AEDs adjusted. Underwent CT head which appeared unchanged, LP on 11/19 with fluoro unsuccessful. Off video EEG 11/20, repeat MRI showed no acute findings. Minimal improvements in neurological exam thus far, does open eyes to voice and blink on command. Ongoing discussion regarding goals of care, tracheostomy/feeding tube in LTAC versus comfort care, POA thus far remains treatment oriented. Had family meeting 11/25, declined tracheostomy and also would not wish to have her remain in an LTAC / nursing home long term. Additionally discussed 11/26 that she would not like to be resuscitated in the event of a cardiac arrest, code status updated to Level 2 DNR/DNI.     Overall very gradual improvements in neurological exam day-to-day, able to breath spontaneously on the ventilator and follow basic commands. Extubated 11/30, now stable for the floor. Of note did have localized area of redness and swelling on left medial thigh that was aspirated on 11/30  (primarily serosanguineous drainage). Will start on Vancomycin, pending repeat imaging of abdomen/pelvis to reassess previously noted left paraspinal muscle hemorrhage, and CTA LLE to evaluate for abscess.           Barriers to discharge:   Follow up CTA LLE and CTA abdomen/pelvis  PT/OT evaluation for potential rehab needs  Completion of antibiotics for SSTI    Medications:   Scheduled Medications:  atorvastatin, 10 mg, Per NG Tube, Daily With Dinner  levalbuterol, 1.25 mg, Nebulization, TID  levETIRAcetam, 750 mg, Per G Tube, Q12H LINO  levothyroxine, 88 mcg, Oral, Early Morning  lidocaine, 2 patch, Topical, Daily  EARLINE ANTIFUNGAL, , Topical, BID  pantoprazole, 40 mg, Intravenous, Q12H LINO  propranolol, 20 mg, Oral, Q12H LINO  sertraline, 50 mg, Per NG Tube, Daily  sodium chloride, 4 mL, Nebulization, TID  thiamine, 100 mg, Per NG Tube, Daily  valproic acid, 500 mg, Oral, Q6H LINO      Continuous IV Infusions:  heparin (porcine), 3-30 Units/kg/hr (Order-Specific), Intravenous, Titrated      PRN Meds:  acetaminophen, 650 mg, Oral, Q4H PRN  albuterol, 2.5 mg, Nebulization, Q6H PRN  heparin (porcine), 10,000 Units, Intravenous, Q6H PRN  heparin (porcine), 5,000 Units, Intravenous, Q6H PRN  ondansetron, 4 mg, Intravenous, Q4H PRN      Discharge Plan: tbd    Vital Signs (last 3 days)       Date/Time Temp Pulse Resp BP MAP (mmHg) SpO2 Calculated FIO2 (%) - Nasal Cannula Nasal Cannula O2 Flow Rate (L/min) O2 Device Patient Position - Orthostatic VS Aldo Coma Scale Score Pain    12/04/24 1600 -- -- -- -- -- -- -- -- -- -- 15 --    12/04/24 15:48:44 98.3 °F (36.8 °C) 76 17 136/78 97 92 % -- -- -- -- -- --    12/04/24 1200 -- -- -- -- -- -- -- -- -- -- 15 No Pain    12/04/24 07:30:30 97.4 °F (36.3 °C) 79 -- 118/71 87 93 % -- -- -- -- -- No Pain    12/03/24 21:49:46 -- 88 -- 141/90 107 92 % -- -- -- -- -- --    12/03/24 2000 -- -- -- -- -- -- 32 3 L/min Nasal cannula -- 15 No Pain    12/03/24 15:45:46 97.8 °F (36.6 °C) 93 --  120/67 85 93 % -- -- Nasal cannula -- -- No Pain    12/03/24 0800 -- -- -- -- -- -- -- -- -- -- 15 No Pain    12/03/24 07:36:01 97.5 °F (36.4 °C) 105 -- 102/74 83 95 % -- -- -- -- -- --    12/03/24 02:05:35 97.8 °F (36.6 °C) 101 16 96/57 70 90 % -- -- -- -- -- --    12/02/24 2300 -- -- -- -- -- -- -- -- -- -- -- No Pain    12/02/24 2000 -- -- -- -- -- -- -- -- -- -- 15 --    12/02/24 1500 -- 95 18 105/54 74 96 % -- -- -- Lying -- --    12/02/24 1400 -- 106 21 100/57 73 100 % -- -- -- -- -- --    12/02/24 1300 -- 102 17 99/50 70 92 % -- -- -- -- -- --    12/02/24 1215 -- -- -- -- -- -- -- -- -- -- 15 --    12/02/24 1200 -- 105 22 102/72 82 89 % -- -- -- -- -- --       Weight (last 2 days)       Date/Time Weight    12/04/24 0600 122 (269.62)    12/03/24 0554 121 (266.32)            Pertinent Labs/Diagnostic Results:   Radiology:  CT lower extremity w contrast left   Final Interpretation by Vinay Stinson MD (12/03 0816)      High attenuating soft tissue density in the posterior medial distal thigh extending to the level of the knee likely subcutaneous hemorrhage given its density. No well-defined discrete abscess.      No soft tissue gas.      Subcutaneous edema and skin thickening in the posterior medial distal thigh extending into the knee suspicious for cellulitis.      Severe tricompartmental osteoarthrosis of the knee including a prominent osseous loose body posteriorly         Workstation performed: [id number]         CT abdomen pelvis w contrast   Final Interpretation by Krishan Sánchez DO (12/03 0823)      Slight interval increase in size of left lower lumbar paraspinal hematoma measuring 3.4 x 2.4 x 4.9  (previously 3.2 x 2.0 x 4.4 cm).      Large ventral abdominal wall hernia containing nonobstructed bowel.      Small bilateral pleural effusions with left lower lobe atelectasis and mild compressive atelectasis right lower lobe. More diffuse opacification of the left hemithorax noted on  view  compared to previous chest x-ray (not included on axial imaging).    Correlate with chest radiograph.         Workstation performed: [id number]                 Results from last 7 days   Lab Units 12/04/24  0902 12/03/24 0456 12/02/24  0555 12/01/24  0541 11/30/24  0547 11/28/24  0814 11/28/24  0500   WBC Thousand/uL 5.37 5.33 4.81 5.81 5.76   < > 7.10   HEMOGLOBIN g/dL 8.3* 8.2* 7.8* 8.1* 8.2*   < > 6.8*   HEMATOCRIT % 28.3* 28.4* 27.3* 28.4* 28.0*   < > 23.0*   PLATELETS Thousands/uL 328 380 355 394* 367   < > 258   TOTAL NEUT ABS Thousands/µL  --   --   --  3.86  --   --   --    BANDS PCT %  --   --  4  --   --   --  1    < > = values in this interval not displayed.         Results from last 7 days   Lab Units 12/04/24  0902 12/03/24 0456 12/02/24  0555 12/01/24  0541 11/30/24  0547 11/29/24  0453   SODIUM mmol/L 143 142 143 144 139 144   POTASSIUM mmol/L 4.1 4.0 4.2 3.8 5.0 4.1   CHLORIDE mmol/L 106 103 106 104 101 103   CO2 mmol/L 33* 32 32 32 30 32   ANION GAP mmol/L 4 7 5 8 8 9   BUN mg/dL 21 28* 35* 37* 32* 26*   CREATININE mg/dL 1.18 1.27 1.30 1.25 1.24 1.28   EGFR ml/min/1.73sq m 48 44 43 45 45 43   CALCIUM mg/dL 8.7 8.8 8.7 8.9 8.9 9.1   MAGNESIUM mg/dL  --  2.0 2.1 2.2 2.3 2.2   PHOSPHORUS mg/dL  --  3.5 4.1 3.9 4.6* 4.4*     Results from last 7 days   Lab Units 12/04/24  0902   AST U/L 12*   ALT U/L 8   ALK PHOS U/L 58   TOTAL PROTEIN g/dL 6.3*   ALBUMIN g/dL 2.8*   TOTAL BILIRUBIN mg/dL 0.30     Results from last 7 days   Lab Units 12/02/24  1139 12/02/24  0114 12/01/24  1200 12/01/24  0732 11/30/24  2352 11/30/24  1704 11/30/24  1114 11/30/24  0620 11/29/24  2352 11/29/24  1710 11/29/24  1145 11/29/24  0552   POC GLUCOSE mg/dl 91 84 98 87 103 95 109 113 115 114 85 95     Results from last 7 days   Lab Units 12/04/24  0902 12/03/24  0456 12/02/24  0555 12/01/24  0541 11/30/24  0547 11/29/24  0453 11/28/24  0500   GLUCOSE RANDOM mg/dL 88 90 77 91 127 106 124       Results from last 7 days   Lab Units  12/04/24  1428 12/04/24  0902 12/03/24  0456 12/02/24  1552 12/01/24  1719 12/01/24  1030   PROTIME seconds 16.2*  --   --   --   --  15.7*   INR  1.27*  --   --   --   --  1.22*   PTT seconds  --  85* 79* 86*   < > 34    < > = values in this interval not displayed.           Results from last 7 days   Lab Units 12/02/24  1057 11/29/24  2142   BLOOD CULTURE  No Growth at 48 hrs.  No Growth at 48 hrs.  --    GRAM STAIN RESULT   --  No Polys or Bacteria seen   WOUND CULTURE   --  No growth     Network Utilization Review Department  ATTENTION: Please call with any questions or concerns to [phone] and carefully listen to the prompts so that you are directed to the right person. All voicemails are confidential.   For Discharge needs, contact Care Management DC Support Team at [phone] opt. 2  Send all requests for admission clinical reviews, approved or denied determinations and any other requests to dedicated fax number below belonging to the [place] where the patient is receiving treatment. List of dedicated fax numbers for the Facilities:  FACILITY NAME UR FAX NUMBER   ADMISSION DENIALS (Administrative/Medical Necessity) [phone]   DISCHARGE SUPPORT TEAM (NETWORK) [phone]   PARENT CHILD HEALTH (Maternity/NICU/Pediatrics) [phone]   [facility] [phone]   [facility] [phone]   [facility] [phone]   [facility] [phone]   [facility] [phone]   [facility] [phone]   [facility] [phone]   [facility] [phone]   [facility] [phone]   [facility] [phone]   [facility] [phone]   [facility] [phone]

## 2024-12-04 NOTE — ASSESSMENT & PLAN NOTE
Noted on admission, tonic clonic activity by significant other s/p 6 mg IV versed load.  LP unremarkable. ME panel and HSV negative but treated w acyclovir [id number].   CTH, CTA, and MRI unremarkable. PoA wishes for full treatment medically but was agreeable to DNR, yes to intubate.     Plan:  Continue 750 mg BID keppra  Continue valproate 500 mg q6h  PT OT: recommending postacute rehab. She is a 2-3 person max assist and would not be safe to d/c home without significant caregiver support

## 2024-12-04 NOTE — PLAN OF CARE
Problem: PAIN - ADULT  Goal: Verbalizes/displays adequate comfort level or baseline comfort level  Description: Interventions:  - Encourage patient to monitor pain and request assistance  - Assess pain using appropriate pain scale  - Administer analgesics based on type and severity of pain and evaluate response  - Implement non-pharmacological measures as appropriate and evaluate response  - Consider cultural and social influences on pain and pain management  - Notify physician/advanced practitioner if interventions unsuccessful or patient reports new pain  12/4/2024 0334 by Renard Holliday RN  Outcome: Progressing  12/4/2024 0334 by Renard Holliday RN  Outcome: Not Progressing     Problem: INFECTION - ADULT  Goal: Absence or prevention of progression during hospitalization  Description: INTERVENTIONS:  - Assess and monitor for signs and symptoms of infection  - Monitor lab/diagnostic results  - Monitor all insertion sites, i.e. indwelling lines, tubes, and drains  - Monitor endotracheal if appropriate and nasal secretions for changes in amount and color  - [place] appropriate cooling/warming therapies per order  - Administer medications as ordered  - Instruct and encourage patient and family to use good hand hygiene technique  - Identify and instruct in appropriate isolation precautions for identified infection/condition  12/4/2024 0334 by Renard Holliday RN  Outcome: Progressing  12/4/2024 0334 by Renard Holliday RN  Outcome: Not Progressing  Goal: Absence of fever/infection during neutropenic period  Description: INTERVENTIONS:  - Monitor WBC    12/4/2024 0334 by Renard Holliday RN  Outcome: Progressing  12/4/2024 0334 by Renard Holliday RN  Outcome: Not Progressing     Problem: SAFETY ADULT  Goal: Patient will remain free of falls  Description: INTERVENTIONS:  - Educate patient/family on patient safety including physical limitations  - Instruct patient to call for assistance with activity   -  Consult OT/PT to assist with strengthening/mobility   - Keep Call bell within reach  - Keep bed low and locked with side rails adjusted as appropriate  - Keep care items and personal belongings within reach  - Initiate and maintain comfort rounds  - Make Fall Risk Sign visible to staff  - Offer Toileting every 1 Hours, in advance of need  - Initiate/Maintain bed alarm  - Obtain necessary fall risk management equipment: yellow socks, bed alarm  - Apply yellow socks and bracelet for high fall risk patients  - Consider moving patient to room near nurses station  12/4/2024 0334 by Renard Holliday RN  Outcome: Progressing  12/4/2024 0334 by Renard Holliday RN  Outcome: Not Progressing  Goal: Maintain or return to baseline ADL function  Description: INTERVENTIONS:  -  Assess patient's ability to carry out ADLs; assess patient's baseline for ADL function and identify physical deficits which impact ability to perform ADLs (bathing, care of mouth/teeth, toileting, grooming, dressing, etc.)  - Assess/evaluate cause of self-care deficits   - Assess range of motion  - Assess patient's mobility; develop plan if impaired  - Assess patient's need for assistive devices and provide as appropriate  - Encourage maximum independence but intervene and supervise when necessary  - Involve family in performance of ADLs  - Assess for home care needs following discharge   - Consider OT consult to assist with ADL evaluation and planning for discharge  - Provide patient education as appropriate  12/4/2024 0334 by Renard Holliday RN  Outcome: Progressing  12/4/2024 0334 by Renard Holliday RN  Outcome: Not Progressing  Goal: Maintains/Returns to pre admission functional level  Description: INTERVENTIONS:  - Perform AM-PAC 6 Click Basic Mobility/ Daily Activity assessment daily.  - Set and communicate daily mobility goal to care team and patient/family/caregiver.   - Collaborate with rehabilitation services on mobility goals if  consulted  - Perform Range of Motion 3 times a day.  - Reposition patient every 3 hours.  - Dangle patient 3 times a day  - Stand patient 3 times a day  - Ambulate patient 3 times a day  - Out of bed to chair 3 times a day   - Out of bed for meals 3 times a day  - Out of bed for toileting  - Record patient progress and toleration of activity level   12/4/2024 0334 by Renard Holliday RN  Outcome: Progressing  12/4/2024 0334 by Renard Holliday RN  Outcome: Not Progressing     Problem: DISCHARGE PLANNING  Goal: Discharge to home or other facility with appropriate resources  Description: INTERVENTIONS:  - Identify barriers to discharge w/patient and caregiver  - Arrange for needed discharge resources and transportation as appropriate  - Identify discharge learning needs (meds, wound care, etc.)  - Arrange for interpretive services to assist at discharge as needed  - Refer to Case Management Department for coordinating discharge planning if the patient needs post-hospital services based on physician/advanced practitioner order or complex needs related to functional status, cognitive ability, or social support system  12/4/2024 0334 by Renard Holliday RN  Outcome: Progressing  12/4/2024 0334 by Renard Holliday RN  Outcome: Not Progressing     Problem: Knowledge Deficit  Goal: Patient/family/caregiver demonstrates understanding of disease process, treatment plan, medications, and discharge instructions  Description: Complete learning assessment and assess knowledge base.  Interventions:  - Provide teaching at level of understanding  - Provide teaching via preferred learning methods  12/4/2024 0334 by Renard Holliday RN  Outcome: Progressing  12/4/2024 0334 by Renard Holliday RN  Outcome: Not Progressing     Problem: NEUROSENSORY - ADULT  Goal: Achieves stable or improved neurological status  Description: INTERVENTIONS  - Monitor and report changes in neurological status  - Monitor vital signs such as  temperature, blood pressure, glucose, and any other labs ordered   - Initiate measures to prevent increased intracranial pressure  - Monitor for seizure activity and implement precautions if appropriate      12/4/2024 0334 by Renard Holliday RN  Outcome: Progressing  12/4/2024 0334 by Renard Holliday RN  Outcome: Not Progressing  Goal: Remains free of injury related to seizures activity  Description: INTERVENTIONS  - Maintain airway, patient safety  and administer oxygen as ordered  - Monitor patient for seizure activity, document and report duration and description of seizure to physician/advanced practitioner  - If seizure occurs,  ensure patient safety during seizure  - Reorient patient post seizure  - Seizure pads on all 4 side rails  - Instruct patient/family to notify RN of any seizure activity including if an aura is experienced  - Instruct patient/family to call for assistance with activity based on nursing assessment  - Administer anti-seizure medications if ordered    12/4/2024 0334 by Renard Holliday RN  Outcome: Progressing  12/4/2024 0334 by Renard Holliday RN  Outcome: Not Progressing  Goal: Achieves maximal functionality and self care  Description: INTERVENTIONS  - Monitor swallowing and airway patency with patient fatigue and changes in neurological status  - Encourage and assist patient to increase activity and self care.   - Encourage visually impaired, hearing impaired and aphasic patients to use assistive/communication devices  12/4/2024 0334 by Renard Holliday RN  Outcome: Progressing  12/4/2024 0334 by Renard Holliday RN  Outcome: Not Progressing     Problem: CARDIOVASCULAR - ADULT  Goal: Maintains optimal cardiac output and hemodynamic stability  Description: INTERVENTIONS:  - Monitor I/O, vital signs and rhythm  - Monitor for S/S and trends of decreased cardiac output  - Administer and titrate ordered vasoactive medications to optimize hemodynamic stability  - Assess quality of  pulses, skin color and temperature  - Assess for signs of decreased coronary artery perfusion  - Instruct patient to report change in severity of symptoms  12/4/2024 0334 by Renard Holliday RN  Outcome: Progressing  12/4/2024 0334 by Renard Holliday RN  Outcome: Not Progressing  Goal: Absence of cardiac dysrhythmias or at baseline rhythm  Description: INTERVENTIONS:  - Continuous cardiac monitoring, vital signs, obtain 12 lead EKG if ordered  - Administer antiarrhythmic and heart rate control medications as ordered  - Monitor electrolytes and administer replacement therapy as ordered  12/4/2024 0334 by Renard Holliday RN  Outcome: Progressing  12/4/2024 0334 by Renard Holliday RN  Outcome: Not Progressing     Problem: RESPIRATORY - ADULT  Goal: Achieves optimal ventilation and oxygenation  Description: INTERVENTIONS:  - Assess for changes in respiratory status  - Assess for changes in mentation and behavior  - Position to facilitate oxygenation and minimize respiratory effort  - Oxygen administered by appropriate delivery if ordered  - Initiate smoking cessation education as indicated  - Encourage broncho-pulmonary hygiene including cough, deep breathe, Incentive Spirometry  - Assess the need for suctioning and aspirate as needed  - Assess and instruct to report SOB or any respiratory difficulty  - Respiratory Therapy support as indicated  12/4/2024 0334 by Renard Holliday RN  Outcome: Progressing  12/4/2024 0334 by Renard Holliday RN  Outcome: Not Progressing     Problem: GASTROINTESTINAL - ADULT  Goal: Minimal or absence of nausea and/or vomiting  Description: INTERVENTIONS:  - Administer IV fluids if ordered to ensure adequate hydration  - Maintain NPO status until nausea and vomiting are resolved  - Nasogastric tube if ordered  - Administer ordered antiemetic medications as needed  - Provide nonpharmacologic comfort measures as appropriate  - Advance diet as tolerated, if ordered  - Consider  nutrition services referral to assist patient with adequate nutrition and appropriate food choices  12/4/2024 0334 by Renard Holliday RN  Outcome: Progressing  12/4/2024 0334 by Renard Holliday RN  Outcome: Not Progressing  Goal: Maintains or returns to baseline bowel function  Description: INTERVENTIONS:  - Assess bowel function  - Encourage oral fluids to ensure adequate hydration  - Administer IV fluids if ordered to ensure adequate hydration  - Administer ordered medications as needed  - Encourage mobilization and activity  - Consider nutritional services referral to assist patient with adequate nutrition and appropriate food choices  12/4/2024 0334 by Renard Holliday RN  Outcome: Progressing  12/4/2024 0334 by Renard Holliday RN  Outcome: Not Progressing  Goal: Maintains adequate nutritional intake  Description: INTERVENTIONS:  - Monitor percentage of each meal consumed  - Identify factors contributing to decreased intake, treat as appropriate  - Assist with meals as needed  - Monitor I&O, weight, and lab values if indicated  - Obtain nutrition services referral as needed  12/4/2024 0334 by Renard Holliday RN  Outcome: Progressing  12/4/2024 0334 by Renard Holliday RN  Outcome: Not Progressing  Goal: Establish and maintain optimal ostomy function  Description: INTERVENTIONS:  - Assess bowel function  - Encourage oral fluids to ensure adequate hydration  - Administer IV fluids if ordered to ensure adequate hydration   - Administer ordered medications as needed  - Encourage mobilization and activity  - Nutrition services referral to assist patient with appropriate food choices  - Assess stoma site  - Consider wound care consult   12/4/2024 0334 by Renard Holliday RN  Outcome: Progressing  12/4/2024 0334 by Renard Holliday RN  Outcome: Not Progressing  Goal: Oral mucous membranes remain intact  Description: INTERVENTIONS  - Assess oral mucosa and hygiene practices  - Implement preventative oral  hygiene regimen  - Implement oral medicated treatments as ordered  - Initiate Nutrition services referral as needed  12/4/2024 0334 by Renard Holliday RN  Outcome: Progressing  12/4/2024 0334 by Renard Holliday RN  Outcome: Not Progressing     Problem: GENITOURINARY - ADULT  Goal: Maintains or returns to baseline urinary function  Description: INTERVENTIONS:  - Assess urinary function  - Encourage oral fluids to ensure adequate hydration if ordered  - Administer IV fluids as ordered to ensure adequate hydration  - Administer ordered medications as needed  - Offer frequent toileting  - Follow urinary retention protocol if ordered  12/4/2024 0334 by Renard Holliday RN  Outcome: Progressing  12/4/2024 0334 by Renard Holliday RN  Outcome: Not Progressing  Goal: Absence of urinary retention  Description: INTERVENTIONS:  - Assess patient’s ability to void and empty bladder  - Monitor I/O  - Bladder scan as needed  - Discuss with physician/AP medications to alleviate retention as needed  - Discuss catheterization for long term situations as appropriate  12/4/2024 0334 by Renard Holliday RN  Outcome: Progressing  12/4/2024 0334 by Renard Holliday RN  Outcome: Not Progressing  Goal: Urinary catheter remains patent  Description: INTERVENTIONS:  - Assess patency of urinary catheter  - If patient has a chronic peace, consider changing catheter if non-functioning  - Follow guidelines for intermittent irrigation of non-functioning urinary catheter  12/4/2024 0334 by Renard Holliday RN  Outcome: Progressing  12/4/2024 0334 by Renard Holliday RN  Outcome: Not Progressing     Problem: METABOLIC, FLUID AND ELECTROLYTES - ADULT  Goal: Electrolytes maintained within normal limits  Description: INTERVENTIONS:  - Monitor labs and assess patient for signs and symptoms of electrolyte imbalances  - Administer electrolyte replacement as ordered  - Monitor response to electrolyte replacements, including repeat lab results as  appropriate  - Instruct patient on fluid and nutrition as appropriate  12/4/2024 0334 by Renard Holliday RN  Outcome: Progressing  12/4/2024 0334 by Renard Holliday RN  Outcome: Not Progressing  Goal: Fluid balance maintained  Description: INTERVENTIONS:  - Monitor labs   - Monitor I/O and WT  - Instruct patient on fluid and nutrition as appropriate  - Assess for signs & symptoms of volume excess or deficit  12/4/2024 0334 by Renard Holliday RN  Outcome: Progressing  12/4/2024 0334 by Renard Holliday RN  Outcome: Not Progressing  Goal: Glucose maintained within target range  Description: INTERVENTIONS:  - Monitor Blood Glucose as ordered  - Assess for signs and symptoms of hyperglycemia and hypoglycemia  - Administer ordered medications to maintain glucose within target range  - Assess nutritional intake and initiate nutrition service referral as needed  12/4/2024 0334 by Renard Holliday RN  Outcome: Progressing  12/4/2024 0334 by Renard Holliday RN  Outcome: Not Progressing     Problem: SKIN/TISSUE INTEGRITY - ADULT  Goal: Skin Integrity remains intact(Skin Breakdown Prevention)  Description: Assess:  -Perform [place] assessment every shift  -Clean and moisturize skin every shift  -Inspect skin when repositioning, toileting, and assisting with ADLS  -Assess under medical devices such as SCDs every shift  -Assess extremities for adequate circulation and sensation     Bed Management:  -Have minimal linens on bed & keep smooth, unwrinkled  -Change linens as needed when moist or perspiring  -Avoid sitting or lying in one position for more than 2 hours while in bed  -Keep HOB at 20degrees     Toileting:  -Offer bedside commode  -Assess for incontinence every hour  -Use incontinent care products after each incontinent episode such as barrier cream    Activity:  -Mobilize patient 3 times a day  -Encourage activity and walks on unit  -Encourage or provide ROM exercises   -Turn and reposition patient every 3  Hours  -Use appropriate equipment to lift or move patient in bed  -Instruct/ Assist with weight shifting every hour when out of bed in chair  -Consider limitation of chair time 1 hour intervals    Skin Care:  -Avoid use of baby powder, tape, friction and shearing, hot water or constrictive clothing  -Relieve pressure over bony prominences using allevyn  -Do not massage red bony areas    Next Steps:  -Teach patient strategies to minimize risks such as immobility   -Consider consults to  interdisciplinary teams such as wound care  12/4/2024 0334 by Renard Holliday RN  Outcome: Progressing  12/4/2024 0334 by Renard Holliday RN  Outcome: Not Progressing  Goal: Incision(s), wounds(s) or drain site(s) healing without S/S of infection  Description: INTERVENTIONS  - Assess and document dressing, incision, wound bed, drain sites and surrounding tissue  - Provide patient and family education  - Perform skin care/dressing changes every other day  12/4/2024 0334 by Renard Holliday RN  Outcome: Progressing  12/4/2024 0334 by Renard Holliday RN  Outcome: Not Progressing  Goal: Pressure injury heals and does not worsen  Description: Interventions:  - Implement low air loss mattress or specialty surface (Criteria met)  - Apply silicone foam dressing  - Instruct/assist with weight shifting every 30 minutes when in chair   - Limit chair time to 2 hour intervals  - Use special pressure reducing interventions such as waffle when in chair   - Apply fecal or urinary incontinence containment device   - Perform passive or active ROM every shift  - Turn and reposition patient & offload bony prominences every 2 hours   - Utilize friction reducing device or surface for transfers   - Consider consults to  interdisciplinary teams such as wound care  - Use incontinent care products after each incontinent episode such as barrier wipes  - Consider nutrition services referral as needed  12/4/2024 0334 by Renard Holliday RN  Outcome:  Progressing  12/4/2024 0334 by Renard Holliday RN  Outcome: Not Progressing     Problem: HEMATOLOGIC - ADULT  Goal: Maintains hematologic stability  Description: INTERVENTIONS  - Assess for signs and symptoms of bleeding or hemorrhage  - Monitor labs  - Administer supportive blood products/factors as ordered and appropriate  12/4/2024 0334 by Renard Holliday RN  Outcome: Progressing  12/4/2024 0334 by Renard Holliday RN  Outcome: Not Progressing     Problem: MUSCULOSKELETAL - ADULT  Goal: Maintain or return mobility to safest level of function  Description: INTERVENTIONS:  - Assess patient's ability to carry out ADLs; assess patient's baseline for ADL function and identify physical deficits which impact ability to perform ADLs (bathing, care of mouth/teeth, toileting, grooming, dressing, etc.)  - Assess/evaluate cause of self-care deficits   - Assess range of motion  - Assess patient's mobility  - Assess patient's need for assistive devices and provide as appropriate  - Encourage maximum independence but intervene and supervise when necessary  - Involve family in performance of ADLs  - Assess for home care needs following discharge   - Consider OT consult to assist with ADL evaluation and planning for discharge  - Provide patient education as appropriate  12/4/2024 0334 by Renard Holliday RN  Outcome: Progressing  12/4/2024 0334 by Renard Holliday RN  Outcome: Not Progressing  Goal: Maintain proper alignment of affected body part  Description: INTERVENTIONS:  - Support, maintain and protect limb and body alignment  - Provide patient/ family with appropriate education  12/4/2024 0334 by Renard Holliday RN  Outcome: Progressing  12/4/2024 0334 by Renard Holliday RN  Outcome: Not Progressing     Problem: Nutrition/Hydration-ADULT  Goal: Nutrient/Hydration intake appropriate for improving, restoring or maintaining nutritional needs  Description: Monitor and assess patient's nutrition/hydration status for  malnutrition. Collaborate with interdisciplinary team and initiate plan and interventions as ordered.  Monitor patient's weight and dietary intake as ordered or per policy. Utilize nutrition screening tool and intervene as necessary. Determine patient's food preferences and provide high-protein, high-caloric foods as appropriate.     INTERVENTIONS:  - Monitor oral intake, urinary output, labs, and treatment plans  - Assess nutrition and hydration status and recommend course of action  - Evaluate amount of meals eaten  - Assist patient with eating if necessary   - Allow adequate time for meals  - Recommend/ encourage appropriate diets, oral nutritional supplements, and vitamin/mineral supplements  - Order, calculate, and assess calorie counts as needed  - Recommend, monitor, and adjust tube feedings and TPN/PPN based on assessed needs  - Assess need for intravenous fluids  - Provide specific nutrition/hydration education as appropriate  - Include patient/family/caregiver in decisions related to nutrition  12/4/2024 0334 by Renard Holliday RN  Outcome: Progressing  12/4/2024 0334 by Renard Holliday RN  Outcome: Not Progressing     Problem: Prexisting or High Potential for Compromised Skin Integrity  Goal: Skin integrity is maintained or improved  Description: INTERVENTIONS:  - Identify patients at risk for skin breakdown  - Assess and monitor skin integrity  - Assess and monitor nutrition and hydration status  - Monitor labs   - Assess for incontinence   - Turn and reposition patient  - Assist with mobility/ambulation  - Relieve pressure over bony prominences  - Avoid friction and shearing  - Provide appropriate hygiene as needed including keeping skin clean and dry  - Evaluate need for skin moisturizer/barrier cream  - Collaborate with interdisciplinary team   - Patient/family teaching  - Consider wound care consult   12/4/2024 0334 by Renard Holliday RN  Outcome: Progressing  12/4/2024 0334 by Renard  LEONIE Holliday  Outcome: Not Progressing

## 2024-12-04 NOTE — ASSESSMENT & PLAN NOTE
Patient presented with acute encephalopathy and seizure-like activity.  Video EEG concerning for status epilepticus.  MRI brain without contrast was limited by motion but showed restricted diffusion in the left temporal lobe which could be postictal versus infection.  Status post LP 11/12 and CSF studies unremarkable, not consistent with infection with 2 WBCs.  Protein mildly elevated which could be due to seizure activity itself.  ME panel negative, stand alone HSV PCR also negative.  However with new onset seizures and possible abnormality of the temporal lobe HSV remains a possibility as PCR may be negative and CSF bland and early disease.  CSF culture with no growth.  The patient has grown E. coli and ESBL Proteus in urine culture but in the absence of prior systemic signs of infection or localizing symptoms UTI, doubt this is causing status epilepticus. HIV negative. Fever and leukocytosis improved.  Mental status remains poor.  IR unable to perform repeat LP due to technical difficulty.  MRI brain without evidence of acute infarct, intracranial hemorrhage or mass.  Mental status improving, she is now following commands.  Patient completed 14 days of intravenous acyclovir and is stable off treatment and cognition improving  -No additional IV acyclovir for now  -Antiepileptic drugs as per neurology  -Monitor cognition

## 2024-12-04 NOTE — ASSESSMENT & PLAN NOTE
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

## 2024-12-04 NOTE — SPEECH THERAPY NOTE
Speech Language/Pathology  Pt completed lunch, SO states that the chicken is somewhat dry but she has been eating/drinking.  She does not want any additional items at this time.  Will follow as able or needed to upgrade.

## 2024-12-04 NOTE — ASSESSMENT & PLAN NOTE
Negative PNA workup: CXR, bronch (resp viktoriya mixed) on 11/13. Monitor off abx  Was initially intubated for airway protection from seizure.  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    TTE 11/14 showed LVEF 60%, moderate concentric hypertrophy, poor windows/view of RV so difficult to assess RVSP.     Plan:  Outpatient sleep study  Home O2 eval closer to discharge  Monitor off abx

## 2024-12-04 NOTE — WOUND OSTOMY CARE
Consult Note - Wound   Adelina Soto 64 y.o. female MRN: [id number]  Unit/Bed#: [facility] 708-01 Encounter: [id number]        Patient is well known to wound care team. She was assessed from head to toe 12/3 - refer to wound care RN note for assessment details. Wound care was re-consulted for left leg incision wound. She is seen lying on [facility] mattress.     Left Medial Thigh: intact, dry, brown in color scab- no skin loss or drainage noted. [place]-area is pink in color. Per ID note, patient with possible thigh hematoma. Will recommend 3m no sting to area daily.           Wound care will continue to follow patient weekly. Secure chat message with questions or concerns.      Skin Care Plan:  1-Cleanse B/L Sacro-Buttocks and Anterior and Posterior Upper Thighs with soap and water. Apply Triad Paste to open aspects of sacro-buttocks. Apply EARLINE Anti-Fungal Cream to Intact aspects of B/L Sacro-Buttocks and posterior thighs BID and PRN episodes of incontinence  2-Turn/reposition q2h or when medically stable for pressure re-distribution on skin. Utilize ATR turning and repositioning system for patient   3-Elevate heels to offload pressure. Apply offloading boots to b/l feet.   4-Moisturize skin daily with skin nourishing cream  5-Ehob cushion in chair when out of bed.  6-Preventative Hydraguard to bilateral heels BID and PRN.   7-B/L Abdomen/Pannus, Breast Folds, and Right Back Fold Wounds: cleanse with soap and water and pat dry. Apply calcium alginate rope (Melgisorb Plus Cavity) to skin fold. Change daily or PRN soilage/displacement.   8-[facility] Low Air Loss Mattress ordered for patient   9-Right Upper Back Blisters: Apply a silicone bordered foam dressing to blistered areas. Vinay with T for Treatment and change every other day or PRN.   10-Apply a preventative 2x2 silicone bordered foam dressing to right cheek. Vinay with P for Prevention and change every 3 days or PRN.   11- Apply 3m no sting skin prep to left medial  thigh scab daily.     Rahel Beatty RN, BSN, CWOCN

## 2024-12-04 NOTE — PROGRESS NOTES
Progress Note - Infectious Disease   Name: Adelina Soto 64 y.o. female I MRN: [id number]  Unit/Bed#: [facility] 708-01 I Date of Admission: 11/12/2024   Date of Service: 12/4/2024 I Hospital Day: 22    Assessment & Plan  Swelling of joint of pelvic region or thigh, left  Possible thigh hematoma versus abscess.  With hypotension consideration for the possibility of infection or bleeding.  CT scan appears more consistent with a hematoma rather than abscess.  Stable off all antibiotics  -Monitor closely off all antibiotics  -Serial exams  Hypotension  Unclear etiology.  Possibly a volume issue.  Consideration for the possibility of leg infection or bleeding with hematoma.  CT scan without new source of infection appears more consistent with evolving hematomas.  Repeat blood cultures remain negative.  Blood pressure is normalized  -Follow-up blood cultures  -Monitor off all antibiotics  -No additional workup for now  Seizure (HCC)   Patient presented with acute encephalopathy and seizure-like activity.  Video EEG concerning for status epilepticus.  MRI brain without contrast was limited by motion but showed restricted diffusion in the left temporal lobe which could be postictal versus infection.  Status post LP 11/12 and CSF studies unremarkable, not consistent with infection with 2 WBCs.  Protein mildly elevated which could be due to seizure activity itself.  ME panel negative, stand alone HSV PCR also negative.  However with new onset seizures and possible abnormality of the temporal lobe HSV remains a possibility as PCR may be negative and CSF bland and early disease.  CSF culture with no growth.  The patient has grown E. coli and ESBL Proteus in urine culture but in the absence of prior systemic signs of infection or localizing symptoms UTI, doubt this is causing status epilepticus. HIV negative. Fever and leukocytosis improved.  Mental status remains poor.  IR unable to perform repeat LP due to technical difficulty.   MRI brain without evidence of acute infarct, intracranial hemorrhage or mass.  Mental status improving, she is now following commands.  Patient completed 14 days of intravenous acyclovir and is stable off treatment and cognition improving  -No additional IV acyclovir for now  -Antiepileptic drugs as per neurology  -Monitor cognition  Bacteriuria  UA with pyuria and urine culture growing E. coli and ESBL Proteus.  No symptoms of infection prior to admission.  Unlikely cause of status epilepticus however in absence of clear cause of seizure activity treated for possible cystitis with 3 days zosyn   -monitor off antibiotics   Shock (HCC)  May be multifactorial related to possible infection, sedating medications.  Off vasopressor support.  Now with recurrent shock that I suspect is secondary to GI bleeding with notable severe anemia.   -Hemodynamic support per critical care team   -Transfusion support  Acute hypoxic respiratory failure (HCC)  Patient intubated for airway protection.  Status post bronchoscopy 11/13 for mucous plugging and thick left-sided secretions seen.  Chest x-ray not consistent with pneumonia.  BAL culture shows growth of mixed respiratory viktoriya.  Chest x-ray with bilateral airspace opacities but no clinical evidence of pneumonia, patient has been on stable ventilator settings without significant secretions.  No clinical evidence of pneumonia at this time.  Repeat chest x-ray with some left basilar consolidation consistent with atelectasis and possible effusion.  Now extubated on oxygen by nasal cannula   -No additional antibiotics indicated.  -Wean off oxygen as able  Bacteremia due to Staphylococcus  1 of 2 blood cultures collected 11/24 are growing staph epidermidis. Likely contaminant. Monitor off additional antibiotics   Morbid obesity with BMI of 50.0-59.9, adult ([facility])  Affects antimicrobial dosing  CKD (chronic kidney disease)  Lab Results   Component Value Date    EGFR 44 12/03/2024    EGFR  43 12/02/2024    EGFR 45 12/01/2024    CREATININE 1.27 12/03/2024    CREATININE 1.30 12/02/2024    CREATININE 1.25 12/01/2024   Baseline creatinine 1.7-1.9.  Creatinine stable, slightly below baseline.  Dose adjust antibiotics as needed for creatinine clearance.  Monitor creatinine daily  Type 2 diabetes mellitus with hyperglycemia, unspecified whether long term insulin use (HCC)  Lab Results   Component Value Date    HGBA1C 5.7 (H) 11/13/2024   Diabetes well-controlled.  Glycemic management per primary team    I have discussed with the primary service the above plan to monitor off all antibiotics.  The primary service agrees with the plan.    Antibiotics:  Off antibiotics    Subjective   Patient has no fever, chills, sweats; no nausea, vomiting, diarrhea; no cough, shortness of breath; no increased pain. No new symptoms.    Objective :  Temp:  [97.4 °F (36.3 °C)-97.8 °F (36.6 °C)] 97.4 °F (36.3 °C)  HR:  [79-93] 79  BP: (118-141)/(67-90) 118/71  SpO2:  [92 %-93 %] 93 %  O2 Device: Nasal cannula  Nasal Cannula O2 Flow Rate (L/min):  [3 L/min] 3 L/min    General:  No acute distress  Psychiatric:  Awake and alert  Pulmonary:  Normal respiratory excursion without accessory muscle use  Abdomen:  Soft, nontender  Extremities: Left thigh swelling and induration unchanged without any drainage.  Skin:  No rashes      Lab Results: I have reviewed the following results:  Results from last 7 days   Lab Units 12/03/24  0456 12/02/24  0555 12/01/24  0541   WBC Thousand/uL 5.33 4.81 5.81   HEMOGLOBIN g/dL 8.2* 7.8* 8.1*   PLATELETS Thousands/uL 380 355 394*     Results from last 7 days   Lab Units 12/03/24  0456 12/02/24  0555 12/01/24  0541   SODIUM mmol/L 142 143 144   POTASSIUM mmol/L 4.0 4.2 3.8   CHLORIDE mmol/L 103 106 104   CO2 mmol/L 32 32 32   BUN mg/dL 28* 35* 37*   CREATININE mg/dL 1.27 1.30 1.25   EGFR ml/min/1.73sq m 44 43 45   CALCIUM mg/dL 8.8 8.7 8.9     Results from last 7 days   Lab Units 12/02/24  6131  11/29/24  2142   BLOOD CULTURE  No Growth at 24 hrs.  No Growth at 24 hrs.  --    GRAM STAIN RESULT   --  No Polys or Bacteria seen   WOUND CULTURE   --  No growth

## 2024-12-05 LAB
ANION GAP SERPL CALCULATED.3IONS-SCNC: 4 MMOL/L (ref 4–13)
ANISOCYTOSIS BLD QL SMEAR: PRESENT
APTT PPP: 111 SECONDS (ref 23–34)
APTT PPP: 68 SECONDS (ref 23–34)
APTT PPP: 83 SECONDS (ref 23–34)
BASOPHILS # BLD MANUAL: 0 THOUSAND/UL (ref 0–0.1)
BASOPHILS NFR MAR MANUAL: 0 % (ref 0–1)
BUN SERPL-MCNC: 18 MG/DL (ref 5–25)
CALCIUM SERPL-MCNC: 9.8 MG/DL (ref 8.4–10.2)
CHLORIDE SERPL-SCNC: 103 MMOL/L (ref 96–108)
CO2 SERPL-SCNC: 36 MMOL/L (ref 21–32)
CREAT SERPL-MCNC: 1.23 MG/DL (ref 0.6–1.3)
EOSINOPHIL # BLD MANUAL: 0.11 THOUSAND/UL (ref 0–0.4)
EOSINOPHIL NFR BLD MANUAL: 2 % (ref 0–6)
ERYTHROCYTE [DISTWIDTH] IN BLOOD BY AUTOMATED COUNT: 22.2 % (ref 11.6–15.1)
GFR SERPL CREATININE-BSD FRML MDRD: 46 ML/MIN/1.73SQ M
GIANT PLATELETS BLD QL SMEAR: PRESENT
GLUCOSE SERPL-MCNC: 109 MG/DL (ref 65–140)
HCT VFR BLD AUTO: 33.4 % (ref 34.8–46.1)
HGB BLD-MCNC: 9.7 G/DL (ref 11.5–15.4)
INR PPP: 1.24 (ref 0.85–1.19)
LYMPHOCYTES # BLD AUTO: 1.52 THOUSAND/UL (ref 0.6–4.47)
LYMPHOCYTES # BLD AUTO: 26 % (ref 14–44)
MACROCYTES BLD QL AUTO: PRESENT
MCH RBC QN AUTO: 33.3 PG (ref 26.8–34.3)
MCHC RBC AUTO-ENTMCNC: 29 G/DL (ref 31.4–37.4)
MCV RBC AUTO: 115 FL (ref 82–98)
MONOCYTES # BLD AUTO: 0.49 THOUSAND/UL (ref 0–1.22)
MONOCYTES NFR BLD: 9 % (ref 4–12)
MYELOCYTE ABSOLUTE CT: 0.11 THOUSAND/UL (ref 0–0.1)
MYELOCYTES NFR BLD MANUAL: 2 % (ref 0–1)
NEUTROPHILS # BLD MANUAL: 3.2 THOUSAND/UL (ref 1.85–7.62)
NEUTS BAND NFR BLD MANUAL: 1 % (ref 0–8)
NEUTS SEG NFR BLD AUTO: 58 % (ref 43–75)
PLATELET # BLD AUTO: 359 THOUSANDS/UL (ref 149–390)
PLATELET BLD QL SMEAR: ADEQUATE
PMV BLD AUTO: 9.3 FL (ref 8.9–12.7)
POLYCHROMASIA BLD QL SMEAR: PRESENT
POTASSIUM SERPL-SCNC: 4.2 MMOL/L (ref 3.5–5.3)
PROTHROMBIN TIME: 15.8 SECONDS (ref 12.3–15)
RBC # BLD AUTO: 2.91 MILLION/UL (ref 3.81–5.12)
RBC MORPH BLD: PRESENT
SODIUM SERPL-SCNC: 143 MMOL/L (ref 135–147)
VARIANT LYMPHS # BLD AUTO: 2 %
WBC # BLD AUTO: 5.42 THOUSAND/UL (ref 4.31–10.16)

## 2024-12-05 PROCEDURE — 80048 BASIC METABOLIC PNL TOTAL CA: CPT

## 2024-12-05 PROCEDURE — 85027 COMPLETE CBC AUTOMATED: CPT

## 2024-12-05 PROCEDURE — 85007 BL SMEAR W/DIFF WBC COUNT: CPT

## 2024-12-05 PROCEDURE — 92526 ORAL FUNCTION THERAPY: CPT

## 2024-12-05 PROCEDURE — 85730 THROMBOPLASTIN TIME PARTIAL: CPT

## 2024-12-05 PROCEDURE — 94640 AIRWAY INHALATION TREATMENT: CPT

## 2024-12-05 PROCEDURE — 94664 DEMO&/EVAL PT USE INHALER: CPT

## 2024-12-05 PROCEDURE — 94760 N-INVAS EAR/PLS OXIMETRY 1: CPT

## 2024-12-05 PROCEDURE — 99232 SBSQ HOSP IP/OBS MODERATE 35: CPT

## 2024-12-05 PROCEDURE — 85610 PROTHROMBIN TIME: CPT

## 2024-12-05 PROCEDURE — 99232 SBSQ HOSP IP/OBS MODERATE 35: CPT | Performed by: INTERNAL MEDICINE

## 2024-12-05 RX ORDER — GUAIFENESIN 100 MG/5ML
200 SOLUTION ORAL EVERY 4 HOURS PRN
Status: DISCONTINUED | OUTPATIENT
Start: 2024-12-05 | End: 2024-12-09 | Stop reason: HOSPADM

## 2024-12-05 RX ORDER — WARFARIN SODIUM 5 MG/1
10 TABLET ORAL
Status: COMPLETED | OUTPATIENT
Start: 2024-12-05 | End: 2024-12-05

## 2024-12-05 RX ADMIN — VALPROIC ACID 500 MG: 500 SOLUTION ORAL at 12:56

## 2024-12-05 RX ADMIN — VALPROIC ACID 500 MG: 500 SOLUTION ORAL at 17:03

## 2024-12-05 RX ADMIN — LEVALBUTEROL HYDROCHLORIDE 1.25 MG: 1.25 SOLUTION RESPIRATORY (INHALATION) at 13:43

## 2024-12-05 RX ADMIN — PROPRANOLOL HYDROCHLORIDE 20 MG: 20 SOLUTION ORAL at 21:26

## 2024-12-05 RX ADMIN — SODIUM CHLORIDE SOLN NEBU 3% 4 ML: 3 NEBU SOLN at 07:47

## 2024-12-05 RX ADMIN — HEPARIN SODIUM 10 UNITS/KG/HR: 10000 INJECTION, SOLUTION INTRAVENOUS at 12:59

## 2024-12-05 RX ADMIN — MICONAZOLE NITRATE: 20 CREAM TOPICAL at 08:05

## 2024-12-05 RX ADMIN — LIDOCAINE 5% 2 PATCH: 700 PATCH TOPICAL at 08:05

## 2024-12-05 RX ADMIN — ATORVASTATIN CALCIUM 10 MG: 10 TABLET, FILM COATED ORAL at 17:03

## 2024-12-05 RX ADMIN — VALPROIC ACID 500 MG: 500 SOLUTION ORAL at 05:08

## 2024-12-05 RX ADMIN — LEVALBUTEROL HYDROCHLORIDE 1.25 MG: 1.25 SOLUTION RESPIRATORY (INHALATION) at 07:47

## 2024-12-05 RX ADMIN — THIAMINE HCL TAB 100 MG 100 MG: 100 TAB at 08:05

## 2024-12-05 RX ADMIN — SODIUM CHLORIDE SOLN NEBU 3% 4 ML: 3 NEBU SOLN at 19:21

## 2024-12-05 RX ADMIN — LEVETIRACETAM 750 MG: 750 TABLET, FILM COATED ORAL at 08:05

## 2024-12-05 RX ADMIN — LEVALBUTEROL HYDROCHLORIDE 1.25 MG: 1.25 SOLUTION RESPIRATORY (INHALATION) at 19:21

## 2024-12-05 RX ADMIN — SERTRALINE HYDROCHLORIDE 50 MG: 50 TABLET ORAL at 08:05

## 2024-12-05 RX ADMIN — SODIUM CHLORIDE SOLN NEBU 3% 4 ML: 3 NEBU SOLN at 13:44

## 2024-12-05 RX ADMIN — MICONAZOLE NITRATE: 20 CREAM TOPICAL at 17:03

## 2024-12-05 RX ADMIN — LEVETIRACETAM 750 MG: 750 TABLET, FILM COATED ORAL at 21:26

## 2024-12-05 RX ADMIN — PANTOPRAZOLE SODIUM 40 MG: 40 INJECTION, POWDER, FOR SOLUTION INTRAVENOUS at 21:26

## 2024-12-05 RX ADMIN — LEVOTHYROXINE SODIUM 88 MCG: 88 TABLET ORAL at 05:08

## 2024-12-05 RX ADMIN — PANTOPRAZOLE SODIUM 40 MG: 40 INJECTION, POWDER, FOR SOLUTION INTRAVENOUS at 08:05

## 2024-12-05 RX ADMIN — WARFARIN SODIUM 10 MG: 5 TABLET ORAL at 17:03

## 2024-12-05 NOTE — PLAN OF CARE
Problem: PAIN - ADULT  Goal: Verbalizes/displays adequate comfort level or baseline comfort level  Description: Interventions:  - Encourage patient to monitor pain and request assistance  - Assess pain using appropriate pain scale  - Administer analgesics based on type and severity of pain and evaluate response  - Implement non-pharmacological measures as appropriate and evaluate response  - Consider cultural and social influences on pain and pain management  - Notify physician/advanced practitioner if interventions unsuccessful or patient reports new pain  Outcome: Progressing     Problem: INFECTION - ADULT  Goal: Absence or prevention of progression during hospitalization  Description: INTERVENTIONS:  - Assess and monitor for signs and symptoms of infection  - Monitor lab/diagnostic results  - Monitor all insertion sites, i.e. indwelling lines, tubes, and drains  - Monitor endotracheal if appropriate and nasal secretions for changes in amount and color  - Grandin appropriate cooling/warming therapies per order  - Administer medications as ordered  - Instruct and encourage patient and family to use good hand hygiene technique  - Identify and instruct in appropriate isolation precautions for identified infection/condition  Outcome: Progressing  Goal: Absence of fever/infection during neutropenic period  Description: INTERVENTIONS:  - Monitor WBC    Outcome: Progressing     Problem: SAFETY ADULT  Goal: Patient will remain free of falls  Description: INTERVENTIONS:  - Educate patient/family on patient safety including physical limitations  - Instruct patient to call for assistance with activity   - Consult OT/PT to assist with strengthening/mobility   - Keep Call bell within reach  - Keep bed low and locked with side rails adjusted as appropriate  - Keep care items and personal belongings within reach  - Initiate and maintain comfort rounds  - Make Fall Risk Sign visible to staff  - Offer Toileting every 2 Hours,  in advance of need  - Initiate/Maintain bed alarm  - Apply yellow socks and bracelet for high fall risk patients  - Consider moving patient to room near nurses station  Outcome: Progressing  Goal: Maintain or return to baseline ADL function  Description: INTERVENTIONS:  -  Assess patient's ability to carry out ADLs; assess patient's baseline for ADL function and identify physical deficits which impact ability to perform ADLs (bathing, care of mouth/teeth, toileting, grooming, dressing, etc.)  - Assess/evaluate cause of self-care deficits   - Assess range of motion  - Assess patient's mobility; develop plan if impaired  - Assess patient's need for assistive devices and provide as appropriate  - Encourage maximum independence but intervene and supervise when necessary  - Involve family in performance of ADLs  - Assess for home care needs following discharge   - Consider OT consult to assist with ADL evaluation and planning for discharge  - Provide patient education as appropriate  Outcome: Progressing  Goal: Maintains/Returns to pre admission functional level  Description: INTERVENTIONS:  - Perform AM-PAC 6 Click Basic Mobility/ Daily Activity assessment daily.  - Set and communicate daily mobility goal to care team and patient/family/caregiver.   - Collaborate with rehabilitation services on mobility goals if consulted  - Perform Range of Motion 3 times a day.  - Reposition patient every 2 hours.  - Dangle patient 3 times a day  - Stand patient 3 times a day  - Ambulate patient 3 times a day  - Out of bed to chair 3 times a day   - Out of bed for meals 3 times a day  - Out of bed for toileting  - Record patient progress and toleration of activity level   Outcome: Progressing     Problem: DISCHARGE PLANNING  Goal: Discharge to home or other facility with appropriate resources  Description: INTERVENTIONS:  - Identify barriers to discharge w/patient and caregiver  - Arrange for needed discharge resources and  transportation as appropriate  - Identify discharge learning needs (meds, wound care, etc.)  - Arrange for interpretive services to assist at discharge as needed  - Refer to Case Management Department for coordinating discharge planning if the patient needs post-hospital services based on physician/advanced practitioner order or complex needs related to functional status, cognitive ability, or social support system  Outcome: Progressing     Problem: Knowledge Deficit  Goal: Patient/family/caregiver demonstrates understanding of disease process, treatment plan, medications, and discharge instructions  Description: Complete learning assessment and assess knowledge base.  Interventions:  - Provide teaching at level of understanding  - Provide teaching via preferred learning methods  Outcome: Progressing     Problem: NEUROSENSORY - ADULT  Goal: Achieves stable or improved neurological status  Description: INTERVENTIONS  - Monitor and report changes in neurological status  - Monitor vital signs such as temperature, blood pressure, glucose, and any other labs ordered   - Initiate measures to prevent increased intracranial pressure  - Monitor for seizure activity and implement precautions if appropriate      Outcome: Progressing  Goal: Remains free of injury related to seizures activity  Description: INTERVENTIONS  - Maintain airway, patient safety  and administer oxygen as ordered  - Monitor patient for seizure activity, document and report duration and description of seizure to physician/advanced practitioner  - If seizure occurs,  ensure patient safety during seizure  - Reorient patient post seizure  - Seizure pads on all 4 side rails  - Instruct patient/family to notify RN of any seizure activity including if an aura is experienced  - Instruct patient/family to call for assistance with activity based on nursing assessment  - Administer anti-seizure medications if ordered    Outcome: Progressing  Goal: Achieves maximal  functionality and self care  Description: INTERVENTIONS  - Monitor swallowing and airway patency with patient fatigue and changes in neurological status  - Encourage and assist patient to increase activity and self care.   - Encourage visually impaired, hearing impaired and aphasic patients to use assistive/communication devices  Outcome: Progressing     Problem: CARDIOVASCULAR - ADULT  Goal: Maintains optimal cardiac output and hemodynamic stability  Description: INTERVENTIONS:  - Monitor I/O, vital signs and rhythm  - Monitor for S/S and trends of decreased cardiac output  - Administer and titrate ordered vasoactive medications to optimize hemodynamic stability  - Assess quality of pulses, skin color and temperature  - Assess for signs of decreased coronary artery perfusion  - Instruct patient to report change in severity of symptoms  Outcome: Progressing  Goal: Absence of cardiac dysrhythmias or at baseline rhythm  Description: INTERVENTIONS:  - Continuous cardiac monitoring, vital signs, obtain 12 lead EKG if ordered  - Administer antiarrhythmic and heart rate control medications as ordered  - Monitor electrolytes and administer replacement therapy as ordered  Outcome: Progressing     Problem: RESPIRATORY - ADULT  Goal: Achieves optimal ventilation and oxygenation  Description: INTERVENTIONS:  - Assess for changes in respiratory status  - Assess for changes in mentation and behavior  - Position to facilitate oxygenation and minimize respiratory effort  - Oxygen administered by appropriate delivery if ordered  - Initiate smoking cessation education as indicated  - Encourage broncho-pulmonary hygiene including cough, deep breathe, Incentive Spirometry  - Assess the need for suctioning and aspirate as needed  - Assess and instruct to report SOB or any respiratory difficulty  - Respiratory Therapy support as indicated  Outcome: Progressing     Problem: GASTROINTESTINAL - ADULT  Goal: Minimal or absence of nausea  and/or vomiting  Description: INTERVENTIONS:  - Administer IV fluids if ordered to ensure adequate hydration  - Maintain NPO status until nausea and vomiting are resolved  - Nasogastric tube if ordered  - Administer ordered antiemetic medications as needed  - Provide nonpharmacologic comfort measures as appropriate  - Advance diet as tolerated, if ordered  - Consider nutrition services referral to assist patient with adequate nutrition and appropriate food choices  Outcome: Progressing  Goal: Maintains or returns to baseline bowel function  Description: INTERVENTIONS:  - Assess bowel function  - Encourage oral fluids to ensure adequate hydration  - Administer IV fluids if ordered to ensure adequate hydration  - Administer ordered medications as needed  - Encourage mobilization and activity  - Consider nutritional services referral to assist patient with adequate nutrition and appropriate food choices  Outcome: Progressing  Goal: Maintains adequate nutritional intake  Description: INTERVENTIONS:  - Monitor percentage of each meal consumed  - Identify factors contributing to decreased intake, treat as appropriate  - Assist with meals as needed  - Monitor I&O, weight, and lab values if indicated  - Obtain nutrition services referral as needed  Outcome: Progressing  Goal: Establish and maintain optimal ostomy function  Description: INTERVENTIONS:  - Assess bowel function  - Encourage oral fluids to ensure adequate hydration  - Administer IV fluids if ordered to ensure adequate hydration   - Administer ordered medications as needed  - Encourage mobilization and activity  - Nutrition services referral to assist patient with appropriate food choices  - Assess stoma site  - Consider wound care consult   Outcome: Progressing  Goal: Oral mucous membranes remain intact  Description: INTERVENTIONS  - Assess oral mucosa and hygiene practices  - Implement preventative oral hygiene regimen  - Implement oral medicated treatments as  ordered  - Initiate Nutrition services referral as needed  Outcome: Progressing     Problem: GENITOURINARY - ADULT  Goal: Maintains or returns to baseline urinary function  Description: INTERVENTIONS:  - Assess urinary function  - Encourage oral fluids to ensure adequate hydration if ordered  - Administer IV fluids as ordered to ensure adequate hydration  - Administer ordered medications as needed  - Offer frequent toileting  - Follow urinary retention protocol if ordered  Outcome: Progressing  Goal: Absence of urinary retention  Description: INTERVENTIONS:  - Assess patient’s ability to void and empty bladder  - Monitor I/O  - Bladder scan as needed  - Discuss with physician/AP medications to alleviate retention as needed  - Discuss catheterization for long term situations as appropriate  Outcome: Progressing  Goal: Urinary catheter remains patent  Description: INTERVENTIONS:  - Assess patency of urinary catheter  - If patient has a chronic peace, consider changing catheter if non-functioning  - Follow guidelines for intermittent irrigation of non-functioning urinary catheter  Outcome: Progressing     Problem: METABOLIC, FLUID AND ELECTROLYTES - ADULT  Goal: Electrolytes maintained within normal limits  Description: INTERVENTIONS:  - Monitor labs and assess patient for signs and symptoms of electrolyte imbalances  - Administer electrolyte replacement as ordered  - Monitor response to electrolyte replacements, including repeat lab results as appropriate  - Instruct patient on fluid and nutrition as appropriate  Outcome: Progressing  Goal: Fluid balance maintained  Description: INTERVENTIONS:  - Monitor labs   - Monitor I/O and WT  - Instruct patient on fluid and nutrition as appropriate  - Assess for signs & symptoms of volume excess or deficit  Outcome: Progressing  Goal: Glucose maintained within target range  Description: INTERVENTIONS:  - Monitor Blood Glucose as ordered  - Assess for signs and symptoms of  hyperglycemia and hypoglycemia  - Administer ordered medications to maintain glucose within target range  - Assess nutritional intake and initiate nutrition service referral as needed  Outcome: Progressing     Problem: SKIN/TISSUE INTEGRITY - ADULT  Goal: Skin Integrity remains intact(Skin Breakdown Prevention)  Description: Assess:  -Perform Carmelo assessment every shift  -Clean and moisturize skin every shift and as needed  -Inspect skin when repositioning, toileting, and assisting with ADLS  -Assess extremities for adequate circulation and sensation     Bed Management:  -Have minimal linens on bed & keep smooth, unwrinkled  -Change linens as needed when moist or perspiring  -Avoid sitting or lying in one position for more than 2 hours while in bed    Toileting:  -Offer bedside commode  -Assess for incontinence every hour  -Use incontinent care products after each incontinent episode such as barrier cream    Activity:  -Mobilize patient 3 times a day  -Encourage activity and walks on unit  -Encourage or provide ROM exercises   -Turn and reposition patient every 2 Hours  -Use appropriate equipment to lift or move patient in bed  -Instruct/ Assist with weight shifting every 30 minutes when out of bed in chair  -Consider limitation of chair time 2 hour intervals    Skin Care:  -Avoid use of baby powder, tape, friction and shearing, hot water or constrictive clothing  -Relieve pressure over bony prominences using cushions  -Do not massage red bony areas    Outcome: Progressing  Goal: Incision(s), wounds(s) or drain site(s) healing without S/S of infection  Description: INTERVENTIONS  - Assess and document dressing, incision, wound bed, drain sites and surrounding tissue  - Provide patient and family education  - Perform skin care/dressing changes every shift  Outcome: Progressing  Goal: Pressure injury heals and does not worsen  Description: Interventions:  - Implement low air loss mattress or specialty surface  (Criteria met)  - Apply silicone foam dressing  - Instruct/assist with weight shifting every 30 minutes when in chair   - Limit chair time to 2 hour intervals  - Use special pressure reducing interventions such as cushions when in chair   - Apply fecal or urinary incontinence containment device   - Turn and reposition patient & offload bony prominences every 2 hours   - Utilize friction reducing device or surface for transfers   - Consider nutrition services referral as needed  Outcome: Progressing     Problem: HEMATOLOGIC - ADULT  Goal: Maintains hematologic stability  Description: INTERVENTIONS  - Assess for signs and symptoms of bleeding or hemorrhage  - Monitor labs  - Administer supportive blood products/factors as ordered and appropriate  Outcome: Progressing     Problem: MUSCULOSKELETAL - ADULT  Goal: Maintain or return mobility to safest level of function  Description: INTERVENTIONS:  - Assess patient's ability to carry out ADLs; assess patient's baseline for ADL function and identify physical deficits which impact ability to perform ADLs (bathing, care of mouth/teeth, toileting, grooming, dressing, etc.)  - Assess/evaluate cause of self-care deficits   - Assess range of motion  - Assess patient's mobility  - Assess patient's need for assistive devices and provide as appropriate  - Encourage maximum independence but intervene and supervise when necessary  - Involve family in performance of ADLs  - Assess for home care needs following discharge   - Consider OT consult to assist with ADL evaluation and planning for discharge  - Provide patient education as appropriate  Outcome: Progressing  Goal: Maintain proper alignment of affected body part  Description: INTERVENTIONS:  - Support, maintain and protect limb and body alignment  - Provide patient/ family with appropriate education  Outcome: Progressing     Problem: Nutrition/Hydration-ADULT  Goal: Nutrient/Hydration intake appropriate for improving, restoring  or maintaining nutritional needs  Description: Monitor and assess patient's nutrition/hydration status for malnutrition. Collaborate with interdisciplinary team and initiate plan and interventions as ordered.  Monitor patient's weight and dietary intake as ordered or per policy. Utilize nutrition screening tool and intervene as necessary. Determine patient's food preferences and provide high-protein, high-caloric foods as appropriate.     INTERVENTIONS:  - Monitor oral intake, urinary output, labs, and treatment plans  - Assess nutrition and hydration status and recommend course of action  - Evaluate amount of meals eaten  - Assist patient with eating if necessary   - Allow adequate time for meals  - Recommend/ encourage appropriate diets, oral nutritional supplements, and vitamin/mineral supplements  - Order, calculate, and assess calorie counts as needed  - Recommend, monitor, and adjust tube feedings and TPN/PPN based on assessed needs  - Assess need for intravenous fluids  - Provide specific nutrition/hydration education as appropriate  - Include patient/family/caregiver in decisions related to nutrition  Outcome: Progressing     Problem: Prexisting or High Potential for Compromised Skin Integrity  Goal: Skin integrity is maintained or improved  Description: INTERVENTIONS:  - Identify patients at risk for skin breakdown  - Assess and monitor skin integrity  - Assess and monitor nutrition and hydration status  - Monitor labs   - Assess for incontinence   - Turn and reposition patient  - Assist with mobility/ambulation  - Relieve pressure over bony prominences  - Avoid friction and shearing  - Provide appropriate hygiene as needed including keeping skin clean and dry  - Evaluate need for skin moisturizer/barrier cream  - Collaborate with interdisciplinary team   - Patient/family teaching  - Consider wound care consult   Outcome: Progressing

## 2024-12-05 NOTE — ASSESSMENT & PLAN NOTE
Continue xopenex/atrovent   Outpatient PFTs  O2 eval closer to d/c; currently on 4L n/c. Girlfriend states pt has 3 L N/C on baseline w home O2 concentrator

## 2024-12-05 NOTE — PLAN OF CARE
Problem: PAIN - ADULT  Goal: Verbalizes/displays adequate comfort level or baseline comfort level  Description: Interventions:  - Encourage patient to monitor pain and request assistance  - Assess pain using appropriate pain scale  - Administer analgesics based on type and severity of pain and evaluate response  - Implement non-pharmacological measures as appropriate and evaluate response  - Consider cultural and social influences on pain and pain management  - Notify physician/advanced practitioner if interventions unsuccessful or patient reports new pain  Outcome: Progressing     Problem: INFECTION - ADULT  Goal: Absence or prevention of progression during hospitalization  Description: INTERVENTIONS:  - Assess and monitor for signs and symptoms of infection  - Monitor lab/diagnostic results  - Monitor all insertion sites, i.e. indwelling lines, tubes, and drains  - Monitor endotracheal if appropriate and nasal secretions for changes in amount and color  - [place] appropriate cooling/warming therapies per order  - Administer medications as ordered  - Instruct and encourage patient and family to use good hand hygiene technique  - Identify and instruct in appropriate isolation precautions for identified infection/condition  Outcome: Progressing  Goal: Absence of fever/infection during neutropenic period  Description: INTERVENTIONS:  - Monitor WBC    Outcome: Progressing     Problem: SAFETY ADULT  Goal: Patient will remain free of falls  Description: INTERVENTIONS:  - Educate patient/family on patient safety including physical limitations  - Instruct patient to call for assistance with activity   - Consult OT/PT to assist with strengthening/mobility   - Keep Call bell within reach  - Keep bed low and locked with side rails adjusted as appropriate  - Keep care items and personal belongings within reach  - Initiate and maintain comfort rounds  - Make Fall Risk Sign visible to staff  - Offer Toileting every 3 Hours,  in advance of need  - Initiate/Maintain bed alarm  - Obtain necessary fall risk management equipment: bed alarm  - Apply yellow socks and bracelet for high fall risk patients  - Consider moving patient to room near nurses station  Outcome: Progressing  Goal: Maintain or return to baseline ADL function  Description: INTERVENTIONS:  -  Assess patient's ability to carry out ADLs; assess patient's baseline for ADL function and identify physical deficits which impact ability to perform ADLs (bathing, care of mouth/teeth, toileting, grooming, dressing, etc.)  - Assess/evaluate cause of self-care deficits   - Assess range of motion  - Assess patient's mobility; develop plan if impaired  - Assess patient's need for assistive devices and provide as appropriate  - Encourage maximum independence but intervene and supervise when necessary  - Involve family in performance of ADLs  - Assess for home care needs following discharge   - Consider OT consult to assist with ADL evaluation and planning for discharge  - Provide patient education as appropriate  Outcome: Progressing  Goal: Maintains/Returns to pre admission functional level  Description: INTERVENTIONS:  - Perform AM-PAC 6 Click Basic Mobility/ Daily Activity assessment daily.  - Set and communicate daily mobility goal to care team and patient/family/caregiver.   - Collaborate with rehabilitation services on mobility goals if consulted  - Perform Range of Motion 3 times a day.  - Reposition patient every 3 hours.  - Dangle patient 3 times a day  - Stand patient 3 times a day  - Ambulate patient 3 times a day  - Out of bed to chair 3 times a day   - Out of bed for meals 3 times a day  - Out of bed for toileting  - Record patient progress and toleration of activity level   Outcome: Progressing     Problem: DISCHARGE PLANNING  Goal: Discharge to home or other facility with appropriate resources  Description: INTERVENTIONS:  - Identify barriers to discharge w/patient and  caregiver  - Arrange for needed discharge resources and transportation as appropriate  - Identify discharge learning needs (meds, wound care, etc.)  - Arrange for interpretive services to assist at discharge as needed  - Refer to Case Management Department for coordinating discharge planning if the patient needs post-hospital services based on physician/advanced practitioner order or complex needs related to functional status, cognitive ability, or social support system  Outcome: Progressing     Problem: Knowledge Deficit  Goal: Patient/family/caregiver demonstrates understanding of disease process, treatment plan, medications, and discharge instructions  Description: Complete learning assessment and assess knowledge base.  Interventions:  - Provide teaching at level of understanding  - Provide teaching via preferred learning methods  Outcome: Progressing     Problem: NEUROSENSORY - ADULT  Goal: Achieves stable or improved neurological status  Description: INTERVENTIONS  - Monitor and report changes in neurological status  - Monitor vital signs such as temperature, blood pressure, glucose, and any other labs ordered   - Initiate measures to prevent increased intracranial pressure  - Monitor for seizure activity and implement precautions if appropriate      Outcome: Progressing  Goal: Remains free of injury related to seizures activity  Description: INTERVENTIONS  - Maintain airway, patient safety  and administer oxygen as ordered  - Monitor patient for seizure activity, document and report duration and description of seizure to physician/advanced practitioner  - If seizure occurs,  ensure patient safety during seizure  - Reorient patient post seizure  - Seizure pads on all 4 side rails  - Instruct patient/family to notify RN of any seizure activity including if an aura is experienced  - Instruct patient/family to call for assistance with activity based on nursing assessment  - Administer anti-seizure medications if  ordered    Outcome: Progressing  Goal: Achieves maximal functionality and self care  Description: INTERVENTIONS  - Monitor swallowing and airway patency with patient fatigue and changes in neurological status  - Encourage and assist patient to increase activity and self care.   - Encourage visually impaired, hearing impaired and aphasic patients to use assistive/communication devices  Outcome: Progressing     Problem: CARDIOVASCULAR - ADULT  Goal: Maintains optimal cardiac output and hemodynamic stability  Description: INTERVENTIONS:  - Monitor I/O, vital signs and rhythm  - Monitor for S/S and trends of decreased cardiac output  - Administer and titrate ordered vasoactive medications to optimize hemodynamic stability  - Assess quality of pulses, skin color and temperature  - Assess for signs of decreased coronary artery perfusion  - Instruct patient to report change in severity of symptoms  Outcome: Progressing  Goal: Absence of cardiac dysrhythmias or at baseline rhythm  Description: INTERVENTIONS:  - Continuous cardiac monitoring, vital signs, obtain 12 lead EKG if ordered  - Administer antiarrhythmic and heart rate control medications as ordered  - Monitor electrolytes and administer replacement therapy as ordered  Outcome: Progressing     Problem: RESPIRATORY - ADULT  Goal: Achieves optimal ventilation and oxygenation  Description: INTERVENTIONS:  - Assess for changes in respiratory status  - Assess for changes in mentation and behavior  - Position to facilitate oxygenation and minimize respiratory effort  - Oxygen administered by appropriate delivery if ordered  - Initiate smoking cessation education as indicated  - Encourage broncho-pulmonary hygiene including cough, deep breathe, Incentive Spirometry  - Assess the need for suctioning and aspirate as needed  - Assess and instruct to report SOB or any respiratory difficulty  - Respiratory Therapy support as indicated  Outcome: Progressing     Problem:  GASTROINTESTINAL - ADULT  Goal: Minimal or absence of nausea and/or vomiting  Description: INTERVENTIONS:  - Administer IV fluids if ordered to ensure adequate hydration  - Maintain NPO status until nausea and vomiting are resolved  - Nasogastric tube if ordered  - Administer ordered antiemetic medications as needed  - Provide nonpharmacologic comfort measures as appropriate  - Advance diet as tolerated, if ordered  - Consider nutrition services referral to assist patient with adequate nutrition and appropriate food choices  Outcome: Progressing  Goal: Maintains or returns to baseline bowel function  Description: INTERVENTIONS:  - Assess bowel function  - Encourage oral fluids to ensure adequate hydration  - Administer IV fluids if ordered to ensure adequate hydration  - Administer ordered medications as needed  - Encourage mobilization and activity  - Consider nutritional services referral to assist patient with adequate nutrition and appropriate food choices  Outcome: Progressing  Goal: Maintains adequate nutritional intake  Description: INTERVENTIONS:  - Monitor percentage of each meal consumed  - Identify factors contributing to decreased intake, treat as appropriate  - Assist with meals as needed  - Monitor I&O, weight, and lab values if indicated  - Obtain nutrition services referral as needed  Outcome: Progressing  Goal: Establish and maintain optimal ostomy function  Description: INTERVENTIONS:  - Assess bowel function  - Encourage oral fluids to ensure adequate hydration  - Administer IV fluids if ordered to ensure adequate hydration   - Administer ordered medications as needed  - Encourage mobilization and activity  - Nutrition services referral to assist patient with appropriate food choices  - Assess stoma site  - Consider wound care consult   Outcome: Progressing  Goal: Oral mucous membranes remain intact  Description: INTERVENTIONS  - Assess oral mucosa and hygiene practices  - Implement preventative  oral hygiene regimen  - Implement oral medicated treatments as ordered  - Initiate Nutrition services referral as needed  Outcome: Progressing     Problem: GENITOURINARY - ADULT  Goal: Maintains or returns to baseline urinary function  Description: INTERVENTIONS:  - Assess urinary function  - Encourage oral fluids to ensure adequate hydration if ordered  - Administer IV fluids as ordered to ensure adequate hydration  - Administer ordered medications as needed  - Offer frequent toileting  - Follow urinary retention protocol if ordered  Outcome: Progressing  Goal: Absence of urinary retention  Description: INTERVENTIONS:  - Assess patient’s ability to void and empty bladder  - Monitor I/O  - Bladder scan as needed  - Discuss with physician/AP medications to alleviate retention as needed  - Discuss catheterization for long term situations as appropriate  Outcome: Progressing  Goal: Urinary catheter remains patent  Description: INTERVENTIONS:  - Assess patency of urinary catheter  - If patient has a chronic peace, consider changing catheter if non-functioning  - Follow guidelines for intermittent irrigation of non-functioning urinary catheter  Outcome: Progressing     Problem: METABOLIC, FLUID AND ELECTROLYTES - ADULT  Goal: Electrolytes maintained within normal limits  Description: INTERVENTIONS:  - Monitor labs and assess patient for signs and symptoms of electrolyte imbalances  - Administer electrolyte replacement as ordered  - Monitor response to electrolyte replacements, including repeat lab results as appropriate  - Instruct patient on fluid and nutrition as appropriate  Outcome: Progressing  Goal: Fluid balance maintained  Description: INTERVENTIONS:  - Monitor labs   - Monitor I/O and WT  - Instruct patient on fluid and nutrition as appropriate  - Assess for signs & symptoms of volume excess or deficit  Outcome: Progressing  Goal: Glucose maintained within target range  Description: INTERVENTIONS:  - Monitor  Blood Glucose as ordered  - Assess for signs and symptoms of hyperglycemia and hypoglycemia  - Administer ordered medications to maintain glucose within target range  - Assess nutritional intake and initiate nutrition service referral as needed  Outcome: Progressing     Problem: SKIN/TISSUE INTEGRITY - ADULT  Goal: Skin Integrity remains intact(Skin Breakdown Prevention)  Description: Assess:  -Perform [place] assessment every shift  -Clean and moisturize skin every day  -Inspect skin when repositioning, toileting, and assisting with ADLS  -Assess under medical devices such as SCDs every shift  -Assess extremities for adequate circulation and sensation     Bed Management:  -Have minimal linens on bed & keep smooth, unwrinkled  -Change linens as needed when moist or perspiring  -Avoid sitting or lying in one position for more than 2 hours while in bed  -Keep HOB at 30degrees     Toileting:  -Offer bedside commode  -Assess for incontinence every 2 hours   -Use incontinent care products after each incontinent episode such as barrier cream    Activity:  -Mobilize patient 3 times a day  -Encourage activity and walks on unit  -Encourage or provide ROM exercises   -Turn and reposition patient every 2 Hours  -Use appropriate equipment to lift or move patient in bed  -Instruct/ Assist with weight shifting every hour  when out of bed in chair  -Consider limitation of chair time 2 hour intervals    Skin Care:  -Avoid use of baby powder, tape, friction and shearing, hot water or constrictive clothing  -Relieve pressure over bony prominences using mepilex  -Do not massage red bony areas    Next Steps:  -Teach patient strategies to minimize risks such as immobility   -Consider consults to  interdisciplinary teams such as wound care  Outcome: Progressing  Goal: Incision(s), wounds(s) or drain site(s) healing without S/S of infection  Description: INTERVENTIONS  - Assess and document dressing, incision, wound bed, drain sites and  surrounding tissue  - Provide patient and family education  - Perform skin care/dressing changes every shfit  Outcome: Progressing  Goal: Pressure injury heals and does not worsen  Description: Interventions:  - Implement low air loss mattress or specialty surface (Criteria met)  - Apply silicone foam dressing  - Instruct/assist with weight shifting every 20 minutes when in chair   - Limit chair time to 2 hour intervals  - Use special pressure reducing interventions such as waffle when in chair   - Apply fecal or urinary incontinence containment device   - Perform passive or active ROM every shift  - Turn and reposition patient & offload bony prominences every 2 hours   - Utilize friction reducing device or surface for transfers   - Consider consults to  interdisciplinary teams such as wound care  - Use incontinent care products after each incontinent episode such as barrier cream  - Consider nutrition services referral as needed  Outcome: Progressing     Problem: HEMATOLOGIC - ADULT  Goal: Maintains hematologic stability  Description: INTERVENTIONS  - Assess for signs and symptoms of bleeding or hemorrhage  - Monitor labs  - Administer supportive blood products/factors as ordered and appropriate  Outcome: Progressing     Problem: MUSCULOSKELETAL - ADULT  Goal: Maintain or return mobility to safest level of function  Description: INTERVENTIONS:  - Assess patient's ability to carry out ADLs; assess patient's baseline for ADL function and identify physical deficits which impact ability to perform ADLs (bathing, care of mouth/teeth, toileting, grooming, dressing, etc.)  - Assess/evaluate cause of self-care deficits   - Assess range of motion  - Assess patient's mobility  - Assess patient's need for assistive devices and provide as appropriate  - Encourage maximum independence but intervene and supervise when necessary  - Involve family in performance of ADLs  - Assess for home care needs following discharge   -  Consider OT consult to assist with ADL evaluation and planning for discharge  - Provide patient education as appropriate  Outcome: Progressing  Goal: Maintain proper alignment of affected body part  Description: INTERVENTIONS:  - Support, maintain and protect limb and body alignment  - Provide patient/ family with appropriate education  Outcome: Progressing     Problem: Nutrition/Hydration-ADULT  Goal: Nutrient/Hydration intake appropriate for improving, restoring or maintaining nutritional needs  Description: Monitor and assess patient's nutrition/hydration status for malnutrition. Collaborate with interdisciplinary team and initiate plan and interventions as ordered.  Monitor patient's weight and dietary intake as ordered or per policy. Utilize nutrition screening tool and intervene as necessary. Determine patient's food preferences and provide high-protein, high-caloric foods as appropriate.     INTERVENTIONS:  - Monitor oral intake, urinary output, labs, and treatment plans  - Assess nutrition and hydration status and recommend course of action  - Evaluate amount of meals eaten  - Assist patient with eating if necessary   - Allow adequate time for meals  - Recommend/ encourage appropriate diets, oral nutritional supplements, and vitamin/mineral supplements  - Order, calculate, and assess calorie counts as needed  - Recommend, monitor, and adjust tube feedings and TPN/PPN based on assessed needs  - Assess need for intravenous fluids  - Provide specific nutrition/hydration education as appropriate  - Include patient/family/caregiver in decisions related to nutrition  Outcome: Progressing     Problem: Prexisting or High Potential for Compromised Skin Integrity  Goal: Skin integrity is maintained or improved  Description: INTERVENTIONS:  - Identify patients at risk for skin breakdown  - Assess and monitor skin integrity  - Assess and monitor nutrition and hydration status  - Monitor labs   - Assess for incontinence    - Turn and reposition patient  - Assist with mobility/ambulation  - Relieve pressure over bony prominences  - Avoid friction and shearing  - Provide appropriate hygiene as needed including keeping skin clean and dry  - Evaluate need for skin moisturizer/barrier cream  - Collaborate with interdisciplinary team   - Patient/family teaching  - Consider wound care consult   Outcome: Progressing

## 2024-12-05 NOTE — PROGRESS NOTES
Progress Note - Hospitalist   Name: Adelina Soto 64 y.o. female I MRN: [id number]  Unit/Bed#: [facility] 708-01 I Date of Admission: 11/12/2024   Date of Service: 12/4/2024 I Hospital Day: 22    Assessment & Plan  Seizure (HCC)  Noted on admission, tonic clonic activity by significant other s/p 6 mg IV versed load.  LP unremarkable. ME panel and HSV negative but treated w acyclovir [id number].   CTH, CTA, and MRI unremarkable. PoA wishes for full treatment medically but was agreeable to DNR, yes to intubate.     Plan:  Continue 750 mg BID keppra  Continue valproate 500 mg q6h    PT OT: recommending postacute rehab. She is a 2-3 person max assist. Spoke at length w patient's caregiver and girlfriend on 12/4 and patient and girlfriend are both in agreement to go home w home PT OT/HH. Patient refuses rehab and caregiver is willing to be there around the clock to provide support. D/c is pending other medical issues - mainly warfarin bridging     Dyslipidemia  Continue atorvastatin 10 mg daily  Paroxysmal atrial fibrillation (HCC)  Noted on admission, likely due to metabolic stress from seizure, shock/hemodynamic changes. Rate controlled <110 without BB, but has been borderline w rates in 100s more recently.    Plan:  Continue 25 mg BID propanolol (given hyperthyroid labs)  Start warfarin bridge 12/4 onwards. Will need at least 3 days on drip. Target 2.0-3.0  Known cost issues w DOACs per girlfriend, so this is not an option    COPD with asthma (HCC)  Continue xopenex/atrovent   Outpatient PFTs  O2 eval closer to d/c; currently on 4L n/c. Girlfriend states pt has 3 L N/C on baseline w home O2 concentrator  Hypothyroid  At home on levothyroxine 100 mcg po qd  Lab Results   Component Value Date    [id number] 0.295 (L) 11/14/2024    FREET4 1.23 (H) 11/14/2024   Labs show slightly hyperthyroid - may be contributing to afib.    Plan:  Will slowly dose decrease to 88 mcg po qd, follow up outpt labs 4-6 weeks    GERD  (gastroesophageal reflux disease)  Continue PPI  Anxiety  Continue zoloft 50 mg daily  Morbid obesity with BMI of 50.0-59.9, adult (HCC)  Complicates all facets of care  Contributing to poor functional status  Should be started on GLP1-ag outpatient - defer to PCP  Depression, recurrent (HCC)  Continue home zoloft  CKD (chronic kidney disease)  Lab Results   Component Value Date    EGFR 48 12/04/2024    EGFR 44 12/03/2024    EGFR 43 12/02/2024    CREATININE 1.18 12/04/2024    CREATININE 1.27 12/03/2024    CREATININE 1.30 12/02/2024     No RADHA  Avoid nephrotoxins  Type 2 diabetes mellitus with hyperglycemia, unspecified whether long term insulin use (HCC)  Lab Results   Component Value Date    HGBA1C 5.7 (H) 11/13/2024       Recent Labs     12/02/24  0114 12/02/24  1139   POCGLU 84 91       Blood Sugar Average: Last 72 hrs:  (P) 90  Remains well controlled while inpatient  On dental soft/dysphagia 3 diet w/o carb modifier - continue  Abnormal EKG (Resolved: 12/4/2024)  11/24 EKG w afib w RVR  See afib  Elevated troponin level not due myocardial infarction (Resolved: 12/4/2024)  Due to demand ischemia from seizure and shock - now resolved  Bacteriuria  Completed course of zosyn x 3d to cover for urinary infectious source with shock, AMS on presentation   Noted pyruia on admission w (+) Ucx for ESBL Proteus, and E. Coli  No further abx indicated  Shock (HCC) (Resolved: 12/4/2024)  Resolved  Treated for HSV encephalitis w/ acyclovir while in ICU w resolution of encephalopathy  Suspect also due to sedative medications while intubated for airway protection from sz    Acute on chronic hypoxic respiratory failure (HCC)  On 3 L NC at home per girlfriend    Negative PNA workup: CXR, bronch (resp viktoriya mixed) on 11/13. Monitor off abx  Was initially intubated for airway protection from seizure.  Now on 4 L N/C, suspect component of untreated/undiagnosed SASHA due to body habitus, plus COPD as documented elsewhere    TTE 11/14  showed LVEF 60%, moderate concentric hypertrophy, poor windows/view of RV so difficult to assess RVSP.     Plan:  Outpatient sleep study  Home O2 eval closer to discharge  Monitor off abx  Outpatient pulm f/u for formal PFTs    Bacteremia due to Staphylococcus  1/2 Bcx (+) staph epi, likely contaminant - monitor off abx  Thigh hematoma, left, initial encounter  Noted 12/2. Started on IV vanc empirically 12/2 for infection. CT consistent w hematoma  Vanc d/c'd on 12/3 by ID.    Plan:  Monitor off abx for now  Monitor WBC, temps  Hypotension (Resolved: 12/4/2024)  Resolved, see shock    VTE Pharmacologic Prophylaxis:   High Risk (Score >/= 5) - Pharmacological DVT Prophylaxis Ordered: heparin drip. Sequential Compression Devices Ordered.    Mobility:   Basic Mobility Inpatient Raw Score: 7  JH-HLM Goal: 2: Bed activities/Dependent transfer  JH-HLM Achieved: 2: Bed activities/Dependent transfer  JH-HLM Goal achieved. Continue to encourage appropriate mobility.    Patient Centered Rounds: I performed bedside rounds with nursing staff today.   Discussions with Specialists or Other Care Team Provider: n/a    Education and Discussions with Family / Patient: Patient declined call to .     Current Length of Stay: 22 day(s)  Current Patient Status: Inpatient   Certification Statement: The patient will continue to require additional inpatient hospital stay due to need for warfarin to coumadin bridge x 3 days   Discharge Plan: Anticipate discharge in >72 hrs to home with home services.    Code Status: Level 2 - DNAR: but accepts endotracheal intubation    Subjective   Patient denies any pain today. She denies fevers/chills, chest pain, shortness of breath, heart palpitations, nausea, vomiting, abdominal pain.  .      Objective :  Temp:  [97.4 °F (36.3 °C)-98.3 °F (36.8 °C)] 98.3 °F (36.8 °C)  HR:  [76-88] 76  BP: (118-141)/(71-90) 137/72  Resp:  [17] 17  SpO2:  [92 %-93 %] 92 %    Body mass index is 46.28 kg/m².      Input and Output Summary (last 24 hours):     Intake/Output Summary (Last 24 hours) at 12/4/2024 2116  Last data filed at 12/4/2024 1804  Gross per 24 hour   Intake 200 ml   Output 300 ml   Net -100 ml       Physical Exam  Vitals reviewed.   Constitutional:       General: She is not in acute distress.     Appearance: She is ill-appearing. She is not diaphoretic.   HENT:      Head: Normocephalic and atraumatic.      Mouth/Throat:      Mouth: Mucous membranes are moist.      Pharynx: Oropharynx is clear.   Eyes:      General: No scleral icterus.  Cardiovascular:      Rate and Rhythm: Normal rate. Rhythm irregular.      Heart sounds: No murmur heard.     No friction rub. No gallop.   Pulmonary:      Effort: Pulmonary effort is normal. No respiratory distress.      Comments: Diminished BS b/l  Abdominal:      General: There is no distension.      Palpations: Abdomen is soft.      Tenderness: There is no abdominal tenderness. There is no guarding.   Musculoskeletal:      Right lower leg: Edema (trace) present.      Left lower leg: Edema (trace) present.   Skin:     General: Skin is warm and dry.   Psychiatric:         Mood and Affect: Mood normal.           Lines/Drains: none                Lab Results: I have reviewed the following results:   Results from last 7 days   Lab Units 12/04/24  0902 12/03/24  0456 12/02/24  0555 12/01/24  0541   WBC Thousand/uL 5.37   < > 4.81 5.81   HEMOGLOBIN g/dL 8.3*   < > 7.8* 8.1*   HEMATOCRIT % 28.3*   < > 27.3* 28.4*   PLATELETS Thousands/uL 328   < > 355 394*   BANDS PCT %  --   --  4  --    SEGS PCT %  --   --   --  67   LYMPHO PCT %  --   --  25 21   MONO PCT %  --   --  11 10   EOS PCT %  --   --  2 0    < > = values in this interval not displayed.     Results from last 7 days   Lab Units 12/04/24  0902   SODIUM mmol/L 143   POTASSIUM mmol/L 4.1   CHLORIDE mmol/L 106   CO2 mmol/L 33*   BUN mg/dL 21   CREATININE mg/dL 1.18   ANION GAP mmol/L 4   CALCIUM mg/dL 8.7   ALBUMIN g/dL  2.8*   TOTAL BILIRUBIN mg/dL 0.30   ALK PHOS U/L 58   ALT U/L 8   AST U/L 12*   GLUCOSE RANDOM mg/dL 88     Results from last 7 days   Lab Units 12/04/24  1428   INR  1.27*     Results from last 7 days   Lab Units 12/02/24  1139 12/02/24  0114 12/01/24  1200 12/01/24  0732 11/30/24  2352 11/30/24  1704 11/30/24  1114 11/30/24  0620 11/29/24  2352 11/29/24  1710 11/29/24  1145 11/29/24  0552   POC GLUCOSE mg/dl 91 84 98 87 103 95 109 113 115 114 85 95         Results from last 7 days   Lab Units 11/28/24  2141   LACTIC ACID mmol/L 0.8       Recent Cultures (last 7 days):   Results from last 7 days   Lab Units 12/02/24  1057 11/29/24  2142   BLOOD CULTURE  No Growth at 48 hrs.  No Growth at 48 hrs.  --    GRAM STAIN RESULT   --  No Polys or Bacteria seen   WOUND CULTURE   --  No growth       Imaging Results Review: I reviewed radiology reports from this admission including: CT head.  Other Study Results Review: EKG was reviewed.     Last 24 Hours Medication List:     Current Facility-Administered Medications:     acetaminophen (TYLENOL) oral suspension 650 mg, Q4H PRN    albuterol inhalation solution 2.5 mg, Q6H PRN    atorvastatin (LIPITOR) tablet 10 mg, Daily With Dinner    heparin (porcine) 25,000 units in 0.45% NaCl 250 mL infusion (premix), Titrated, Last Rate: 13 Units/kg/hr (12/04/24 1920)    heparin (porcine) injection 10,000 Units, Q6H PRN    heparin (porcine) injection 5,000 Units, Q6H PRN    levalbuterol (XOPENEX) inhalation solution 1.25 mg, TID    levETIRAcetam (KEPPRA) tablet 750 mg, Q12H LINO    levothyroxine tablet 88 mcg, Early Morning    lidocaine (LIDODERM) 5 % patch 2 patch, Daily    moisture barrier miconazole 2% cream (aka EARLINE MOISTURE BARRIER ANTIFUNGAL CREAM), BID    ondansetron (ZOFRAN) injection 4 mg, Q4H PRN    pantoprazole (PROTONIX) injection 40 mg, Q12H LINO    propranolol (INDERAL) oral solution 20 mg, Q12H LINO    sertraline (ZOLOFT) tablet 50 mg, Daily    sodium chloride 3 % inhalation  solution 4 mL, TID    thiamine tablet 100 mg, Daily    valproic acid (DEPAKENE) oral soln 500 mg, Q6H LINO    Administrative Statements   Today, Patient Was Seen By: Thuy Son MD  I have spent a total time of 40 minutes in caring for this patient on the day of the visit/encounter including Impressions, Counseling / Coordination of care, Documenting in the medical record, Reviewing / ordering tests, medicine, procedures  , Obtaining or reviewing history  , and Communicating with other healthcare professionals .    **Please Note: This note may have been constructed using a voice recognition system.**

## 2024-12-05 NOTE — ASSESSMENT & PLAN NOTE
Lab Results   Component Value Date    HGBA1C 5.7 (H) 11/13/2024       Recent Labs     12/02/24  0114 12/02/24  1139   POCGLU 84 91       Blood Sugar Average: Last 72 hrs:  (P) 90  Remains well controlled while inpatient  On dental soft/dysphagia 3 diet w/o carb modifier - continue

## 2024-12-05 NOTE — ASSESSMENT & PLAN NOTE
Noted on admission, tonic clonic activity by significant other s/p 6 mg IV versed load.  LP unremarkable. ME panel and HSV negative but treated w acyclovir [id number].   CTH, CTA, and MRI unremarkable. PoA wishes for full treatment medically but was agreeable to DNR, yes to intubate.     Plan:  Continue 750 mg BID keppra  Continue valproate 500 mg q6h    PT OT: recommending postacute rehab. She is a 2-3 person max assist. Spoke at length w patient's caregiver and girlfriend on 12/4 and patient and girlfriend are both in agreement to go home w home PT OT/HH. Patient refuses rehab and caregiver is willing to be there around the clock to provide support. D/c is pending other medical issues - mainly warfarin bridging

## 2024-12-05 NOTE — ASSESSMENT & PLAN NOTE
Resolved  Treated for HSV encephalitis w/ acyclovir while in ICU w resolution of encephalopathy  Suspect also due to sedative medications while intubated for airway protection from sz

## 2024-12-05 NOTE — ASSESSMENT & PLAN NOTE
Lab Results   Component Value Date    EGFR 46 12/05/2024    EGFR 48 12/04/2024    EGFR 44 12/03/2024    CREATININE 1.23 12/05/2024    CREATININE 1.18 12/04/2024    CREATININE 1.27 12/03/2024     Baseline creatinine 1.7-1.9.  Creatinine stable, slightly below baseline.  Dose adjust antibiotics as needed for creatinine clearance.  Monitor creatinine daily

## 2024-12-05 NOTE — RESTORATIVE TECHNICIAN NOTE
Restorative Technician Note      Patient Name: Adelina Soto     Note Type: Mobility  Patient Position Upon Consult: Supine  Activity Performed: Repositioned  Education Provided: Yes  Patient Position at End of Consult: Supine; All needs within reach; Bed/Chair alarm activated    Taurus HENDERSON, Restorative Technician,

## 2024-12-05 NOTE — PROGRESS NOTES
Progress Note - Infectious Disease   Name: Adelina Soto 64 y.o. female I MRN: [id number]  Unit/Bed#: [facility] 708-01 I Date of Admission: 11/12/2024   Date of Service: 12/5/2024 I Hospital Day: 23    Assessment & Plan  Thigh hematoma, left, initial encounter  Possible thigh hematoma versus abscess.  With hypotension consideration for the possibility of infection or bleeding.  CT scan appears more consistent with a hematoma rather than abscess.  Stable off all antibiotics  -Monitor closely off all antibiotics  -Serial exams  Seizure (HCC)   Patient presented with acute encephalopathy and seizure-like activity.  Video EEG concerning for status epilepticus.  MRI brain without contrast was limited by motion but showed restricted diffusion in the left temporal lobe which could be postictal versus infection.  Status post LP 11/12 and CSF studies unremarkable, not consistent with infection with 2 WBCs.  Protein mildly elevated which could be due to seizure activity itself.  ME panel negative, stand alone HSV PCR also negative.  However with new onset seizures and possible abnormality of the temporal lobe HSV remains a possibility as PCR may be negative and CSF bland and early disease.  CSF culture with no growth.  The patient has grown E. coli and ESBL Proteus in urine culture but in the absence of prior systemic signs of infection or localizing symptoms UTI, doubt this is causing status epilepticus. HIV negative. Fever and leukocytosis improved.  Mental status remains poor.  IR unable to perform repeat LP due to technical difficulty.  MRI brain without evidence of acute infarct, intracranial hemorrhage or mass.  Mental status improving, she is now following commands.  Patient completed 14 days of intravenous acyclovir and is stable off treatment and cognition improving  -No additional IV acyclovir for now  -Antiepileptic drugs as per neurology  -Monitor cognition  Bacteriuria  UA with pyuria and urine culture growing E.  coli and ESBL Proteus.  No symptoms of infection prior to admission.  Unlikely cause of status epilepticus however in absence of clear cause of seizure activity treated for possible cystitis with 3 days zosyn   -monitor off antibiotics   Acute on chronic hypoxic respiratory failure (HCC)  Patient intubated for airway protection.  Status post bronchoscopy 11/13 for mucous plugging and thick left-sided secretions seen.  Chest x-ray not consistent with pneumonia.  BAL culture shows growth of mixed respiratory viktoriya.  Chest x-ray with bilateral airspace opacities but no clinical evidence of pneumonia, patient has been on stable ventilator settings without significant secretions.  No clinical evidence of pneumonia at this time.  Repeat chest x-ray with some left basilar consolidation consistent with atelectasis and possible effusion.  Now extubated on oxygen by nasal cannula   -No additional antibiotics indicated.  -Wean off oxygen as able  Bacteremia due to Staphylococcus  1 of 2 blood cultures collected 11/24 are growing staph epidermidis. Likely contaminant. Monitor off additional antibiotics   Morbid obesity with BMI of 50.0-59.9, adult ([facility])  Affects antimicrobial dosing  CKD (chronic kidney disease)  Lab Results   Component Value Date    EGFR 46 12/05/2024    EGFR 48 12/04/2024    EGFR 44 12/03/2024    CREATININE 1.23 12/05/2024    CREATININE 1.18 12/04/2024    CREATININE 1.27 12/03/2024     Baseline creatinine 1.7-1.9.  Creatinine stable, slightly below baseline.  Dose adjust antibiotics as needed for creatinine clearance.  Monitor creatinine daily  Type 2 diabetes mellitus with hyperglycemia, unspecified whether long term insulin use ([facility])  Lab Results   Component Value Date    HGBA1C 5.7 (H) 11/13/2024   Diabetes well-controlled.  Glycemic management per primary team    I have discussed with the primary service the above plan to monitor off all antibiotics.  The primary service agrees with the  plan.    Antibiotics:  Off antibiotics    Subjective   Patient has no fever, chills, sweats; no nausea, vomiting, diarrhea; no cough, shortness of breath; no pain. No new symptoms.    Objective :  Temp:  [97 °F (36.1 °C)-98.3 °F (36.8 °C)] 97 °F (36.1 °C)  HR:  [72-79] 75  BP: (100-137)/(68-79) 100/79  Resp:  [17-19] 18  SpO2:  [92 %-97 %] 96 %  O2 Device: Nasal cannula  Nasal Cannula O2 Flow Rate (L/min):  [3 L/min] 3 L/min    General:  No acute distress  Psychiatric:  Awake and alert  Pulmonary:  Normal respiratory excursion without accessory muscle use  Abdomen:  Soft, nontender  Extremities:  No edema  Skin:  No rashes.  Left thigh with ecchymosis and induration.      Lab Results: I have reviewed the following results:  Results from last 7 days   Lab Units 12/05/24  0618 12/04/24  0902 12/03/24  0456   WBC Thousand/uL 5.42 5.37 5.33   HEMOGLOBIN g/dL 9.7* 8.3* 8.2*   PLATELETS Thousands/uL 359 328 380     Results from last 7 days   Lab Units 12/05/24  0618 12/04/24  0902 12/03/24  0456   SODIUM mmol/L 143 143 142   POTASSIUM mmol/L 4.2 4.1 4.0   CHLORIDE mmol/L 103 106 103   CO2 mmol/L 36* 33* 32   BUN mg/dL 18 21 28*   CREATININE mg/dL 1.23 1.18 1.27   EGFR ml/min/1.73sq m 46 48 44   CALCIUM mg/dL 9.8 8.7 8.8   AST U/L  --  12*  --    ALT U/L  --  8  --    ALK PHOS U/L  --  58  --    ALBUMIN g/dL  --  2.8*  --      Results from last 7 days   Lab Units 12/02/24  1057 11/29/24  2142   BLOOD CULTURE  No Growth at 48 hrs.  No Growth at 48 hrs.  --    GRAM STAIN RESULT   --  No Polys or Bacteria seen   WOUND CULTURE   --  No growth

## 2024-12-05 NOTE — SPEECH THERAPY NOTE
"Speech Language/Pathology  Speech-Language Pathology Progress Note      Patient Name: Adelina Soto    Today's Date: 12/5/2024      Subjective:  Pt was awake and alert. She was sitting upright in bed. Patient stated \"some of it is too hard\".    Objective:  Pt was seen today for dysphagia therapy. Current diet is dysphagia 3 dental soft with thin liquids. Pt was on 3L O2 via nasal cannula. Oral care had already been completed. Focus of today's session was to maximize PO intake safety. Textures offered today included soft solid, mech soft soft solid, and thin liquid via straw.  Swallow function:   Pt was attempting to self feed material.  Bolus retrieval was WFL.  Mastication and Breakdown were prolonged and disorganized. She was unable to clear several boluses and spit out the material.  She then attempted the soup and was able to breakdown and clear those soft noodles.  She stated she does not want puree but some of the solids on the level 3 are difficult for her.  Educated on levels and options and she decided to keep the same diet for now.  She also does not like any ensure or boost.       Assessment:  The pt continues w/ some oral dysphagia with the solids but does not wish to change the diet, does not want puree. For now will continue with this diet and monitor.  Offer alternated sips thin to assist w/ clearing and breakdown.   Plan:  Continue dysphagia 3 dental soft and thin liquids.   Oral care often  Will follow 1-2xweek for any education or needed changes  "

## 2024-12-05 NOTE — ASSESSMENT & PLAN NOTE
Noted on admission, likely due to metabolic stress from seizure, shock/hemodynamic changes. Rate controlled <110 without BB, but has been borderline w rates in 100s more recently.    Plan:  Continue 25 mg BID propanolol (given hyperthyroid labs)  Start warfarin bridge 12/4 onwards. Will need at least 3 days on drip. Target 2.0-3.0  Known cost issues w DOACs per girlfriend, so this is not an option

## 2024-12-05 NOTE — ASSESSMENT & PLAN NOTE
Lab Results   Component Value Date    EGFR 48 12/04/2024    EGFR 44 12/03/2024    EGFR 43 12/02/2024    CREATININE 1.18 12/04/2024    CREATININE 1.27 12/03/2024    CREATININE 1.30 12/02/2024     No RADHA  Avoid nephrotoxins

## 2024-12-05 NOTE — ASSESSMENT & PLAN NOTE
At home on levothyroxine 100 mcg po qd  Lab Results   Component Value Date    [id number] 0.295 (L) 11/14/2024    FREET4 1.23 (H) 11/14/2024   Labs show slightly hyperthyroid - may be contributing to afib.    Plan:  Will slowly dose decrease to 88 mcg po qd, follow up outpt labs 4-6 weeks

## 2024-12-05 NOTE — ASSESSMENT & PLAN NOTE
On 3 L NC at home per girlfriend    Negative PNA workup: CXR, bronch (resp viktoriya mixed) on 11/13. Monitor off abx  Was initially intubated for airway protection from seizure.  Now on 4 L N/C, suspect component of untreated/undiagnosed SASHA due to body habitus, plus COPD as documented elsewhere    TTE 11/14 showed LVEF 60%, moderate concentric hypertrophy, poor windows/view of RV so difficult to assess RVSP.     Plan:  Outpatient sleep study  Home O2 eval closer to discharge  Monitor off abx  Outpatient pulm f/u for formal PFTs

## 2024-12-05 NOTE — ASSESSMENT & PLAN NOTE
Noted 12/2. Started on IV vanc empirically 12/2 for infection. CT consistent w hematoma  Vanc d/c'd on 12/3 by ID.    Plan:  Monitor off abx for now  Monitor WBC, temps

## 2024-12-06 LAB
ANION GAP SERPL CALCULATED.3IONS-SCNC: 5 MMOL/L (ref 4–13)
APTT PPP: 106 SECONDS (ref 23–34)
APTT PPP: 94 SECONDS (ref 23–34)
BUN SERPL-MCNC: 15 MG/DL (ref 5–25)
CALCIUM SERPL-MCNC: 9.2 MG/DL (ref 8.4–10.2)
CHLORIDE SERPL-SCNC: 102 MMOL/L (ref 96–108)
CO2 SERPL-SCNC: 36 MMOL/L (ref 21–32)
CREAT SERPL-MCNC: 1.2 MG/DL (ref 0.6–1.3)
ERYTHROCYTE [DISTWIDTH] IN BLOOD BY AUTOMATED COUNT: 22.5 % (ref 11.6–15.1)
GFR SERPL CREATININE-BSD FRML MDRD: 47 ML/MIN/1.73SQ M
GLUCOSE SERPL-MCNC: 85 MG/DL (ref 65–140)
HCT VFR BLD AUTO: 30.8 % (ref 34.8–46.1)
HGB BLD-MCNC: 8.8 G/DL (ref 11.5–15.4)
INR PPP: 1.6 (ref 0.85–1.19)
MCH RBC QN AUTO: 33.5 PG (ref 26.8–34.3)
MCHC RBC AUTO-ENTMCNC: 28.6 G/DL (ref 31.4–37.4)
MCV RBC AUTO: 117 FL (ref 82–98)
PLATELET # BLD AUTO: 325 THOUSANDS/UL (ref 149–390)
PMV BLD AUTO: 9.5 FL (ref 8.9–12.7)
POTASSIUM SERPL-SCNC: 3.9 MMOL/L (ref 3.5–5.3)
PROTHROMBIN TIME: 19.2 SECONDS (ref 12.3–15)
RBC # BLD AUTO: 2.63 MILLION/UL (ref 3.81–5.12)
SODIUM SERPL-SCNC: 143 MMOL/L (ref 135–147)
WBC # BLD AUTO: 5.37 THOUSAND/UL (ref 4.31–10.16)

## 2024-12-06 PROCEDURE — 85610 PROTHROMBIN TIME: CPT

## 2024-12-06 PROCEDURE — 94760 N-INVAS EAR/PLS OXIMETRY 1: CPT

## 2024-12-06 PROCEDURE — 85027 COMPLETE CBC AUTOMATED: CPT

## 2024-12-06 PROCEDURE — 94664 DEMO&/EVAL PT USE INHALER: CPT

## 2024-12-06 PROCEDURE — 85730 THROMBOPLASTIN TIME PARTIAL: CPT

## 2024-12-06 PROCEDURE — 80048 BASIC METABOLIC PNL TOTAL CA: CPT

## 2024-12-06 PROCEDURE — 99232 SBSQ HOSP IP/OBS MODERATE 35: CPT | Performed by: INTERNAL MEDICINE

## 2024-12-06 PROCEDURE — 94640 AIRWAY INHALATION TREATMENT: CPT

## 2024-12-06 PROCEDURE — 99232 SBSQ HOSP IP/OBS MODERATE 35: CPT

## 2024-12-06 RX ORDER — WARFARIN SODIUM 5 MG/1
10 TABLET ORAL
Status: COMPLETED | OUTPATIENT
Start: 2024-12-06 | End: 2024-12-06

## 2024-12-06 RX ADMIN — VALPROIC ACID 500 MG: 500 SOLUTION ORAL at 06:15

## 2024-12-06 RX ADMIN — VALPROIC ACID 500 MG: 500 SOLUTION ORAL at 13:10

## 2024-12-06 RX ADMIN — LEVALBUTEROL HYDROCHLORIDE 1.25 MG: 1.25 SOLUTION RESPIRATORY (INHALATION) at 13:00

## 2024-12-06 RX ADMIN — SODIUM CHLORIDE SOLN NEBU 3% 4 ML: 3 NEBU SOLN at 19:04

## 2024-12-06 RX ADMIN — MICONAZOLE NITRATE: 20 CREAM TOPICAL at 10:40

## 2024-12-06 RX ADMIN — SODIUM CHLORIDE SOLN NEBU 3% 4 ML: 3 NEBU SOLN at 13:00

## 2024-12-06 RX ADMIN — THIAMINE HCL TAB 100 MG 100 MG: 100 TAB at 10:39

## 2024-12-06 RX ADMIN — PANTOPRAZOLE SODIUM 40 MG: 40 INJECTION, POWDER, FOR SOLUTION INTRAVENOUS at 21:14

## 2024-12-06 RX ADMIN — LEVETIRACETAM 750 MG: 750 TABLET, FILM COATED ORAL at 10:39

## 2024-12-06 RX ADMIN — LEVALBUTEROL HYDROCHLORIDE 1.25 MG: 1.25 SOLUTION RESPIRATORY (INHALATION) at 19:04

## 2024-12-06 RX ADMIN — LEVOTHYROXINE SODIUM 88 MCG: 88 TABLET ORAL at 06:15

## 2024-12-06 RX ADMIN — PROPRANOLOL HYDROCHLORIDE 20 MG: 20 SOLUTION ORAL at 10:39

## 2024-12-06 RX ADMIN — LIDOCAINE 5% 2 PATCH: 700 PATCH TOPICAL at 10:40

## 2024-12-06 RX ADMIN — VALPROIC ACID 500 MG: 500 SOLUTION ORAL at 00:29

## 2024-12-06 RX ADMIN — MICONAZOLE NITRATE: 20 CREAM TOPICAL at 18:11

## 2024-12-06 RX ADMIN — VALPROIC ACID 500 MG: 500 SOLUTION ORAL at 18:09

## 2024-12-06 RX ADMIN — PANTOPRAZOLE SODIUM 40 MG: 40 INJECTION, POWDER, FOR SOLUTION INTRAVENOUS at 10:48

## 2024-12-06 RX ADMIN — PROPRANOLOL HYDROCHLORIDE 20 MG: 20 SOLUTION ORAL at 21:15

## 2024-12-06 RX ADMIN — SERTRALINE HYDROCHLORIDE 50 MG: 50 TABLET ORAL at 10:39

## 2024-12-06 RX ADMIN — LEVALBUTEROL HYDROCHLORIDE 1.25 MG: 1.25 SOLUTION RESPIRATORY (INHALATION) at 07:07

## 2024-12-06 RX ADMIN — SODIUM CHLORIDE SOLN NEBU 3% 4 ML: 3 NEBU SOLN at 07:07

## 2024-12-06 RX ADMIN — LEVETIRACETAM 750 MG: 750 TABLET, FILM COATED ORAL at 21:14

## 2024-12-06 RX ADMIN — HEPARIN SODIUM 10 UNITS/KG/HR: 10000 INJECTION, SOLUTION INTRAVENOUS at 09:05

## 2024-12-06 RX ADMIN — WARFARIN SODIUM 10 MG: 5 TABLET ORAL at 18:09

## 2024-12-06 RX ADMIN — ATORVASTATIN CALCIUM 10 MG: 10 TABLET, FILM COATED ORAL at 16:26

## 2024-12-06 NOTE — PROGRESS NOTES
Progress Note - Infectious Disease   Name: Adelina Soto 64 y.o. female I MRN: [id number]  Unit/Bed#: [facility] 708-01 I Date of Admission: 11/12/2024   Date of Service: 12/6/2024 I Hospital Day: 24    Assessment & Plan  Thigh hematoma, left, initial encounter  Possible thigh hematoma versus abscess.  With hypotension consideration for the possibility of infection or bleeding.  CT scan appears more consistent with a hematoma rather than abscess.  Stable off all antibiotics  -Monitor closely off all antibiotics  -Serial exams  Seizure (HCC)   Patient presented with acute encephalopathy and seizure-like activity.  Video EEG concerning for status epilepticus.  MRI brain without contrast was limited by motion but showed restricted diffusion in the left temporal lobe which could be postictal versus infection.  Status post LP 11/12 and CSF studies unremarkable, not consistent with infection with 2 WBCs.  Protein mildly elevated which could be due to seizure activity itself.  ME panel negative, stand alone HSV PCR also negative.  However with new onset seizures and possible abnormality of the temporal lobe HSV remains a possibility as PCR may be negative and CSF bland and early disease.  CSF culture with no growth.  The patient has grown E. coli and ESBL Proteus in urine culture but in the absence of prior systemic signs of infection or localizing symptoms UTI, doubt this is causing status epilepticus. HIV negative. Fever and leukocytosis improved.  Mental status remains poor.  IR unable to perform repeat LP due to technical difficulty.  MRI brain without evidence of acute infarct, intracranial hemorrhage or mass.  Mental status improving, she is now following commands.  Patient completed 14 days of intravenous acyclovir and is stable off treatment and cognition improving  -No additional IV acyclovir for now  -Antiepileptic drugs as per neurology  -Monitor cognition  Bacteriuria  UA with pyuria and urine culture growing E.  coli and ESBL Proteus.  No symptoms of infection prior to admission.  Unlikely cause of status epilepticus however in absence of clear cause of seizure activity treated for possible cystitis with 3 days zosyn   -monitor off antibiotics   Acute on chronic hypoxic respiratory failure (HCC)  Patient intubated for airway protection.  Status post bronchoscopy 11/13 for mucous plugging and thick left-sided secretions seen.  Chest x-ray not consistent with pneumonia.  BAL culture shows growth of mixed respiratory viktoriya.  Chest x-ray with bilateral airspace opacities but no clinical evidence of pneumonia, patient has been on stable ventilator settings without significant secretions.  No clinical evidence of pneumonia at this time.  Repeat chest x-ray with some left basilar consolidation consistent with atelectasis and possible effusion.  Now extubated on oxygen by nasal cannula   -No additional antibiotics indicated.  -Wean off oxygen as able  Bacteremia due to Staphylococcus  1 of 2 blood cultures collected 11/24 are growing staph epidermidis. Likely contaminant. Monitor off additional antibiotics   Morbid obesity with BMI of 50.0-59.9, adult ([facility])  Affects antimicrobial dosing  CKD (chronic kidney disease)  Lab Results   Component Value Date    EGFR 47 12/06/2024    EGFR 46 12/05/2024    EGFR 48 12/04/2024    CREATININE 1.20 12/06/2024    CREATININE 1.23 12/05/2024    CREATININE 1.18 12/04/2024     Baseline creatinine 1.7-1.9.  Creatinine stable, slightly below baseline.  Dose adjust antibiotics as needed for creatinine clearance.  Monitor creatinine daily  Type 2 diabetes mellitus with hyperglycemia, unspecified whether long term insulin use ([facility])  Lab Results   Component Value Date    HGBA1C 5.7 (H) 11/13/2024   Diabetes well-controlled.  Glycemic management per primary team    I have discussed with the primary service the above plan to her off all antibiotics.  The primary service agrees with the plan.    No active  infectious disease issues.  Infectious disease service will sign off for now.  Please message me if any questions.    Antibiotics:  Off antibiotics    Subjective   Patient has no fever, chills, sweats; no nausea, vomiting, diarrhea; no cough, shortness of breath; decreased thought pain. No new symptoms.    Objective :  Temp:  [97.2 °F (36.2 °C)] 97.2 °F (36.2 °C)  HR:  [70-81] 70  BP: (110-112)/(48-72) 110/48  SpO2:  [95 %-97 %] 95 %  O2 Device: Nasal cannula  Nasal Cannula O2 Flow Rate (L/min):  [3 L/min] 3 L/min    General:  No acute distress  Psychiatric:  Awake and alert  Pulmonary:  Normal respiratory excursion without accessory muscle use  Abdomen:  Soft, nontender  Extremities: Left thigh with discoloration and induration but decreased tenderness.  Skin:  No rashes      Lab Results: I have reviewed the following results:  Results from last 7 days   Lab Units 12/06/24  0626 12/05/24  0618 12/04/24  0902   WBC Thousand/uL 5.37 5.42 5.37   HEMOGLOBIN g/dL 8.8* 9.7* 8.3*   PLATELETS Thousands/uL 325 359 328     Results from last 7 days   Lab Units 12/06/24  0626 12/05/24  0618 12/04/24  0902   SODIUM mmol/L 143 143 143   POTASSIUM mmol/L 3.9 4.2 4.1   CHLORIDE mmol/L 102 103 106   CO2 mmol/L 36* 36* 33*   BUN mg/dL 15 18 21   CREATININE mg/dL 1.20 1.23 1.18   EGFR ml/min/1.73sq m 47 46 48   CALCIUM mg/dL 9.2 9.8 8.7   AST U/L  --   --  12*   ALT U/L  --   --  8   ALK PHOS U/L  --   --  58   ALBUMIN g/dL  --   --  2.8*     Results from last 7 days   Lab Units 12/02/24  1057 11/29/24  2142   BLOOD CULTURE  No Growth at 72 hrs.  No Growth at 72 hrs.  --    GRAM STAIN RESULT   --  No Polys or Bacteria seen   WOUND CULTURE   --  No growth

## 2024-12-06 NOTE — ASSESSMENT & PLAN NOTE
Lab Results   Component Value Date    HGBA1C 5.7 (H) 11/13/2024     Fasting BG within goal range, no SSI needed    Blood Sugar Average: Last 72 hrs:  (P) 87.5  Remains well controlled while inpatient  On dental soft/dysphagia 3 diet w/o carb modifier - continue

## 2024-12-06 NOTE — ASSESSMENT & PLAN NOTE
On 3 L NC at home per girlfriend    Negative PNA workup: CXR, bronch (resp viktoriya mixed) on 11/13. Monitor off abx  Was initially intubated for airway protection from seizure.  Now on 4 L N/C, suspect component of untreated/undiagnosed SASHA due to body habitus, plus COPD as documented elsewhere    TTE 11/14 showed LVEF 60%, moderate concentric hypertrophy, poor windows/view of RV so difficult to assess RVSP.     Plan:  Outpatient sleep study  Home O2 eval closer to discharge  Monitor off abx  Outpatient pulm f/u for formal PFTs  Resp protocol

## 2024-12-06 NOTE — ASSESSMENT & PLAN NOTE
Lab Results   Component Value Date    EGFR 47 12/06/2024    EGFR 46 12/05/2024    EGFR 48 12/04/2024    CREATININE 1.20 12/06/2024    CREATININE 1.23 12/05/2024    CREATININE 1.18 12/04/2024     Baseline creatinine 1.7-1.9.  Creatinine stable, slightly below baseline.  Dose adjust antibiotics as needed for creatinine clearance.  Monitor creatinine daily

## 2024-12-06 NOTE — ASSESSMENT & PLAN NOTE
Continue xopenex/atrovent   Outpatient PFTs  O2 eval closer to d/c; currently on 2L n/c. Girlfriend states pt has 2-3 L N/C on baseline w home O2 concentrator

## 2024-12-06 NOTE — ASSESSMENT & PLAN NOTE
Lab Results   Component Value Date    EGFR 47 12/06/2024    EGFR 46 12/05/2024    EGFR 48 12/04/2024    CREATININE 1.20 12/06/2024    CREATININE 1.23 12/05/2024    CREATININE 1.18 12/04/2024     No RADHA  Avoid nephrotoxins

## 2024-12-06 NOTE — PROGRESS NOTES
Progress Note - Hospitalist   Name: Adelina Soto 64 y.o. female I MRN: [id number]  Unit/Bed#: [facility] 708-01 I Date of Admission: 11/12/2024   Date of Service: 12/6/2024 I Hospital Day: 24    Assessment & Plan  Seizure (HCC)  Noted on admission, tonic clonic activity by significant other s/p 6 mg IV versed load.  LP unremarkable. ME panel and HSV negative but treated w acyclovir [id number].   CTH, CTA, and MRI unremarkable. PoA wishes for full treatment medically but was agreeable to DNR, yes to intubate.     Plan:  Continue 750 mg BID keppra  Continue valproate 500 mg q6h    PT OT: recommending postacute rehab. She is a 2-3 person max assist. Spoke at length w patient's caregiver and girlfriend on 12/4 and patient and girlfriend are both in agreement to go home w home PT OT/HH. Patient refuses rehab and caregiver is willing to be there around the clock to provide support. D/c is pending other medical issues - mainly warfarin bridging     Dyslipidemia  Continue atorvastatin 10 mg daily  Paroxysmal atrial fibrillation (HCC)  Noted on admission, likely due to metabolic stress from seizure, shock/hemodynamic changes. Rate controlled <110 without BB, but has been borderline w rates in 100s more recently.    Recent Labs     12/04/24  1428 12/05/24  0618 12/06/24  0626   INR 1.27* 1.24* 1.60*     S/p 5 mg, 10 mg of warfarin single doses    Plan:  Continue 25 mg BID propanolol (given hyperthyroid labs)  10 mg tonight warfarin 12/6  Start warfarin bridge 12/4 onwards. Will need at least 3 days on drip. Target 2.0-3.0  Known cost issues w DOACs per girlfriend, so this is not an option    COPD with asthma (HCC)  Continue xopenex/atrovent   Outpatient PFTs  O2 eval closer to d/c; currently on 2L n/c. Girlfriend states pt has 2-3 L N/C on baseline w home O2 concentrator  Hypothyroid  At home on levothyroxine 100 mcg po qd  Lab Results   Component Value Date    [id number] 0.295 (L) 11/14/2024    FREET4 1.23 (H) 11/14/2024    Labs show slightly hyperthyroid - may be contributing to afib.    Plan:  Will slowly dose decrease to 88 mcg po qd, follow up outpt labs 4-6 weeks    GERD (gastroesophageal reflux disease)  Continue PPI  Anxiety  Continue zoloft 50 mg daily  Morbid obesity with BMI of 50.0-59.9, adult (HCC)  Complicates all facets of care  Contributing to poor functional status  Should be started on GLP1-ag outpatient - defer to PCP  Depression, recurrent (HCC)  Continue home zoloft  CKD (chronic kidney disease)  Lab Results   Component Value Date    EGFR 47 12/06/2024    EGFR 46 12/05/2024    EGFR 48 12/04/2024    CREATININE 1.20 12/06/2024    CREATININE 1.23 12/05/2024    CREATININE 1.18 12/04/2024     No RADHA  Avoid nephrotoxins  Type 2 diabetes mellitus with hyperglycemia, unspecified whether long term insulin use (HCC)  Lab Results   Component Value Date    HGBA1C 5.7 (H) 11/13/2024     Fasting BG within goal range, no SSI needed    Blood Sugar Average: Last 72 hrs:    Remains well controlled while inpatient  On dental soft/dysphagia 3 diet w/o carb modifier - continue  Bacteriuria  Completed course of zosyn x 3d to cover for urinary infectious source with shock, AMS on presentation   Noted pyruia on admission w (+) Ucx for ESBL Proteus, and E. Coli  No further abx indicated  Acute on chronic hypoxic respiratory failure (HCC)  On 3 L NC at home per girlfriend    Negative PNA workup: CXR, bronch (resp viktoriya mixed) on 11/13. Monitor off abx  Was initially intubated for airway protection from seizure.  Now on 4 L N/C, suspect component of untreated/undiagnosed SASHA due to body habitus, plus COPD as documented elsewhere    TTE 11/14 showed LVEF 60%, moderate concentric hypertrophy, poor windows/view of RV so difficult to assess RVSP.     Plan:  Outpatient sleep study  Home O2 eval closer to discharge  Monitor off abx  Outpatient pulm f/u for formal PFTs  Resp protocol    Bacteremia due to Staphylococcus  1/2 Bcx (+) staph epi,  likely contaminant - monitor off abx  Thigh hematoma, left, initial encounter  Noted 12/2. Started on IV vanc empirically 12/2 for infection. CT consistent w hematoma  Vanc d/c'd on 12/3 by ID.  ID has signed off    Plan:  Monitor off abx for now  Monitor WBC, temps    VTE Pharmacologic Prophylaxis: VTE Score: 6 High Risk (Score >/= 5) - Pharmacological DVT Prophylaxis Ordered: heparin drip. Sequential Compression Devices Ordered.    Mobility:   Basic Mobility Inpatient Raw Score: 8  -[facility] Goal: 3: Sit at edge of bed  JH-HLM Achieved: 2: Bed activities/Dependent transfer  JH-HLM Goal NOT achieved. Continue with multidisciplinary rounding and encourage appropriate mobility to improve upon -[facility] goals.    Patient Centered Rounds: I performed bedside rounds with nursing staff today.   Discussions with Specialists or Other Care Team Provider: ID    Education and Discussions with Family / Patient: Updated  (girlfriend) at bedside.    Current Length of Stay: 24 day(s)  Current Patient Status: Inpatient   Certification Statement: The patient will continue to require additional inpatient hospital stay due to need for warfarin to coumadin bridge x 3+ days goal INR 2.0-3.0   Discharge Plan: Anticipate discharge in >72 hrs to home with home services.    Code Status: Level 2 - DNAR: but accepts endotracheal intubation    Subjective   Patient has no complaints today. Girlfriend at bedside reports no issues. Reiterated plan for warfarin to heparin bridge to patient as she had some questions.    Denies fevers/chills, chest pain, shortness of breath, heart palpitations, nausea, vomiting, abdominal pain.        Objective :  Temp:  [97 °F (36.1 °C)-97.2 °F (36.2 °C)] 97 °F (36.1 °C)  HR:  [69-81] 73  BP: (110-120)/(48-72) 120/65  Resp:  [18] 18  SpO2:  [95 %-98 %] 97 %  O2 Device: Nasal cannula  Nasal Cannula O2 Flow Rate (L/min):  [3 L/min] 3 L/min    Body mass index is 45.98 kg/m².     Input and Output Summary (last  24 hours):   No intake or output data in the 24 hours ending 12/06/24 1804      Physical Exam  Vitals reviewed.   Constitutional:       General: She is not in acute distress.     Appearance: She is ill-appearing. She is not diaphoretic.   HENT:      Head: Normocephalic and atraumatic.      Mouth/Throat:      Mouth: Mucous membranes are moist.      Pharynx: Oropharynx is clear.   Eyes:      General: No scleral icterus.  Cardiovascular:      Rate and Rhythm: Normal rate and regular rhythm.      Heart sounds: No murmur heard.     No friction rub. No gallop.   Pulmonary:      Effort: Pulmonary effort is normal. No respiratory distress.      Comments: Diminished BS b/l  Abdominal:      General: There is no distension.      Palpations: Abdomen is soft.      Tenderness: There is no abdominal tenderness. There is no guarding.   Musculoskeletal:      Right lower leg: Edema (trace) present.      Left lower leg: Edema (trace) present.      Comments: Able to turn/reposition onto her side independently  Moving all 4 extremities    Skin:     General: Skin is warm and dry.   Psychiatric:         Mood and Affect: Mood normal.         Lines/Drains: none                Lab Results: I have reviewed the following results:   Results from last 7 days   Lab Units 12/06/24  0626 12/05/24  0618 12/02/24  0555 12/01/24  0541   WBC Thousand/uL 5.37 5.42   < > 5.81   HEMOGLOBIN g/dL 8.8* 9.7*   < > 8.1*   HEMATOCRIT % 30.8* 33.4*   < > 28.4*   PLATELETS Thousands/uL 325 359   < > 394*   BANDS PCT %  --  1   < >  --    SEGS PCT %  --   --   --  67   LYMPHO PCT %  --  26   < > 21   MONO PCT %  --  9   < > 10   EOS PCT %  --  2   < > 0    < > = values in this interval not displayed.     Results from last 7 days   Lab Units 12/06/24  0626 12/05/24  0618 12/04/24  0902   SODIUM mmol/L 143   < > 143   POTASSIUM mmol/L 3.9   < > 4.1   CHLORIDE mmol/L 102   < > 106   CO2 mmol/L 36*   < > 33*   BUN mg/dL 15   < > 21   CREATININE mg/dL 1.20   < > 1.18    ANION GAP mmol/L 5   < > 4   CALCIUM mg/dL 9.2   < > 8.7   ALBUMIN g/dL  --   --  2.8*   TOTAL BILIRUBIN mg/dL  --   --  0.30   ALK PHOS U/L  --   --  58   ALT U/L  --   --  8   AST U/L  --   --  12*   GLUCOSE RANDOM mg/dL 85   < > 88    < > = values in this interval not displayed.     Results from last 7 days   Lab Units 12/06/24  0626   INR  1.60*     Results from last 7 days   Lab Units 12/02/24  1139 12/02/24  0114 12/01/24  1200 12/01/24  0732 11/30/24  2352 11/30/24  1704 11/30/24  1114 11/30/24  0620 11/29/24  2352   POC GLUCOSE mg/dl 91 84 98 87 103 95 109 113 115                 Recent Cultures (last 7 days):   Results from last 7 days   Lab Units 12/02/24  1057 11/29/24  2142   BLOOD CULTURE  No Growth After 4 Days.  No Growth After 4 Days.  --    GRAM STAIN RESULT   --  No Polys or Bacteria seen   WOUND CULTURE   --  No growth       Imaging Results Review: I reviewed radiology reports from this admission including: CT head.  Other Study Results Review: EKG was reviewed.     Last 24 Hours Medication List:     Current Facility-Administered Medications:     acetaminophen (TYLENOL) oral suspension 650 mg, Q4H PRN    albuterol inhalation solution 2.5 mg, Q6H PRN    atorvastatin (LIPITOR) tablet 10 mg, Daily With Dinner    guaiFENesin (ROBITUSSIN) oral liquid 200 mg, Q4H PRN    heparin (porcine) 25,000 units in 0.45% NaCl 250 mL infusion (premix), Titrated, Last Rate: 10 Units/kg/hr (12/06/24 0905)    heparin (porcine) injection 10,000 Units, Q6H PRN    heparin (porcine) injection 5,000 Units, Q6H PRN    levalbuterol (XOPENEX) inhalation solution 1.25 mg, TID    levETIRAcetam (KEPPRA) tablet 750 mg, Q12H LINO    levothyroxine tablet 88 mcg, Early Morning    lidocaine (LIDODERM) 5 % patch 2 patch, Daily    moisture barrier miconazole 2% cream (aka EARLINE MOISTURE BARRIER ANTIFUNGAL CREAM), BID    ondansetron (ZOFRAN) injection 4 mg, Q4H PRN    pantoprazole (PROTONIX) injection 40 mg, Q12H LINO    propranolol  (INDERAL) oral solution 20 mg, Q12H LINO    sertraline (ZOLOFT) tablet 50 mg, Daily    sodium chloride 3 % inhalation solution 4 mL, TID    thiamine tablet 100 mg, Daily    valproic acid (DEPAKENE) oral soln 500 mg, Q6H LINO    warfarin (COUMADIN) tablet 10 mg, Once (warfarin)    Administrative Statements   Today, Patient Was Seen By: Thuy Son MD  I have spent a total time of 40 minutes in caring for this patient on the day of the visit/encounter including Impressions, Counseling / Coordination of care, Documenting in the medical record, Reviewing / ordering tests, medicine, procedures  , Obtaining or reviewing history  , and Communicating with other healthcare professionals .    **Please Note: This note may have been constructed using a voice recognition system.**

## 2024-12-06 NOTE — ASSESSMENT & PLAN NOTE
Lab Results   Component Value Date    EGFR 46 12/05/2024    EGFR 48 12/04/2024    EGFR 44 12/03/2024    CREATININE 1.23 12/05/2024    CREATININE 1.18 12/04/2024    CREATININE 1.27 12/03/2024     No RADHA  Avoid nephrotoxins

## 2024-12-06 NOTE — ASSESSMENT & PLAN NOTE
Noted on admission, tonic clonic activity by significant other s/p 6 mg IV versed load.  LP unremarkable. ME panel and HSV negative but treated w acyclovir [id number].   CTH, CTA, and MRI unremarkable. PoA wishes for full treatment medically but was agreeable to DNR, yes to intubate.     Plan:  Continue 750 mg BID keppra  Continue valproate 500 mg q6h    PT OT: recommending postacute rehab. She is a 2-3 person max assist. Spoke at length w patient's caregiver and girlfriend on 12/4 and patient and girlfriend are both in agreement to go home w home PT OT/HH. Patient refuses rehab and caregiver is willing to be there around the clock to provide support. D/c is pending other medical issues - mainly warfarin bridging

## 2024-12-06 NOTE — ASSESSMENT & PLAN NOTE
At home on levothyroxine 100 mcg po qd  Lab Results   Component Value Date    [id number] 0.295 (L) 11/14/2024    FREET4 1.23 (H) 11/14/2024   Labs show slightly hyperthyroid - may be contributing to afib.    Plan:  Will slowly dose decrease to 88 mcg po qd, follow up outpt labs 4-6 weeks

## 2024-12-06 NOTE — ASSESSMENT & PLAN NOTE
Noted on admission, tonic clonic activity by significant other s/p 6 mg IV versed load.  LP unremarkable. ME panel and HSV negative but treated w acyclovir [id number].   CTH, CTA, and MRI unremarkable. PoA wishes for full treatment medically but was agreeable to DNR, yes to intubate.     Plan:  Continue 750 mg BID keppra  Continue valproate 500 mg q6h    PT OT: recommending postacute rehab. She is a 2-3 person max assist. Spoke at length w patient's caregiver and girlfriend on 12/4 and patient and girlfriend are both in agreement to go home w home PT OT/HH. Patient refuses rehab and caregiver is willing to be there around the clock to provide support. D/c is pending other medical issues - mainly warfarin bridging

## 2024-12-06 NOTE — ASSESSMENT & PLAN NOTE
At home on levothyroxine 100 mcg po qd  Lab Results   Component Value Date    [id number] 0.295 (L) 11/14/2024    FREET4 1.23 (H) 11/14/2024   Labs show slightly hyperthyroid - may be contributing to afib.    Plan:  Will slowly dose decrease to 88 mcg po qd, follow up outpt labs 4-6 weeks

## 2024-12-06 NOTE — CASE MANAGEMENT
Case Management Discharge Planning Note    Patient name Adelina HUNG [place]  Location [facility] 708/[facility] 708-01 MRN [id number]  : 1960 Date 2024       Current Admission Date: 2024  Current Admission Diagnosis:Seizure (HCC)   Patient Active Problem List    Diagnosis Date Noted Date Diagnosed    Thigh hematoma, left, initial encounter 2024     Bacteremia due to Staphylococcus 2024     Bacteriuria 11/15/2024     Acute on chronic hypoxic respiratory failure (HCC) 11/15/2024     Seizure ([facility]) 2024     Diabetic nephropathy associated with type 2 diabetes mellitus (HCC) 2024     Coagulopathy ([facility]) 2024     Type 2 diabetes mellitus with hyperglycemia, unspecified whether long term insulin use (HCC) 2024     Chronic anticoagulation 2023     Chronic respiratory failure with hypercapnia ([facility]) 2023     CKD (chronic kidney disease) 2023     Anemia in stage 3b chronic kidney disease  (HCC) 2023     Chronic respiratory failure with hypoxia ([facility]) 2023     History of hysterectomy 2022     Panniculitis 2022     Abnormal CT scan 2022     Panniculus 2022     Bilateral pleural effusion 2022     Cardiomegaly 2022     Depression, recurrent (HCC) 2022     Pre-ulcerative corn or callous 2022     Morbid obesity with BMI of 50.0-59.9, adult (HCC) 2021     Secondary hyperparathyroidism of renal origin (HCC) 2021     GERD (gastroesophageal reflux disease) 2020     Anxiety 2020     Chronic constipation 2020     Paroxysmal atrial fibrillation (HCC) 2020     COPD with asthma (HCC) 2020     Hypothyroid 2020     Cellulitis 09/10/2020     H/O right nephrectomy 09/10/2020     Hyperparathyroidism (HCC) 2020     Dyslipidemia 2019     Hypertensive chronic kidney disease with stage 1 through stage 4 chronic kidney disease, or unspecified chronic kidney disease  10/04/2018     Persistent proteinuria 10/04/2018     Iron deficiency 10/04/2018       LOS (days): 24  Geometric Mean LOS (GMLOS) (days): 8.7  Days to GMLOS:-15.1     OBJECTIVE:  Risk of Unplanned Readmission Score: 25.07         Current admission status: Inpatient   Preferred Pharmacy:   [facility]/pharmacy #[id number] - [place] - [address]  [address] [address]  Phone: [phone] Fax: [phone]    [facility] [place], [place] - [address]  [address]  [address]  [facility] [address]  Phone: [phone] Fax: [phone]    Primary Care Provider: TULIO [place]    Primary Insurance: [facility]  Secondary Insurance: [facility]    DISCHARGE DETAILS:       Additional Comments: Patient not medically stable for discharge- awaiting bridge to Coumadin.  SO still stating that she can care for patient at home; declines rehab.

## 2024-12-06 NOTE — PLAN OF CARE
Problem: PAIN - ADULT  Goal: Verbalizes/displays adequate comfort level or baseline comfort level  Description: Interventions:  - Encourage patient to monitor pain and request assistance  - Assess pain using appropriate pain scale  - Administer analgesics based on type and severity of pain and evaluate response  - Implement non-pharmacological measures as appropriate and evaluate response  - Consider cultural and social influences on pain and pain management  - Notify physician/advanced practitioner if interventions unsuccessful or patient reports new pain  Outcome: Progressing     Problem: INFECTION - ADULT  Goal: Absence or prevention of progression during hospitalization  Description: INTERVENTIONS:  - Assess and monitor for signs and symptoms of infection  - Monitor lab/diagnostic results  - Monitor all insertion sites, i.e. indwelling lines, tubes, and drains  - Monitor endotracheal if appropriate and nasal secretions for changes in amount and color  - [place] appropriate cooling/warming therapies per order  - Administer medications as ordered  - Instruct and encourage patient and family to use good hand hygiene technique  - Identify and instruct in appropriate isolation precautions for identified infection/condition  Outcome: Progressing  Goal: Absence of fever/infection during neutropenic period  Description: INTERVENTIONS:  - Monitor WBC    Outcome: Progressing     Problem: SAFETY ADULT  Goal: Patient will remain free of falls  Description: INTERVENTIONS:  - Educate patient/family on patient safety including physical limitations  - Instruct patient to call for assistance with activity   - Consult OT/PT to assist with strengthening/mobility   - Keep Call bell within reach  - Keep bed low and locked with side rails adjusted as appropriate  - Keep care items and personal belongings within reach  - Initiate and maintain comfort rounds  - Make Fall Risk Sign visible to staff  - Offer Toileting every 2 Hours,  in advance of need  - Initiate/Maintain bed alarm  - Obtain necessary fall risk management equipment  - Apply yellow socks and bracelet for high fall risk patients  - Consider moving patient to room near nurses station  Outcome: Progressing  Goal: Maintain or return to baseline ADL function  Description: INTERVENTIONS:  -  Assess patient's ability to carry out ADLs; assess patient's baseline for ADL function and identify physical deficits which impact ability to perform ADLs (bathing, care of mouth/teeth, toileting, grooming, dressing, etc.)  - Assess/evaluate cause of self-care deficits   - Assess range of motion  - Assess patient's mobility; develop plan if impaired  - Assess patient's need for assistive devices and provide as appropriate  - Encourage maximum independence but intervene and supervise when necessary  - Involve family in performance of ADLs  - Assess for home care needs following discharge   - Consider OT consult to assist with ADL evaluation and planning for discharge  - Provide patient education as appropriate  Outcome: Progressing  Goal: Maintains/Returns to pre admission functional level  Description: INTERVENTIONS:  - Perform AM-PAC 6 Click Basic Mobility/ Daily Activity assessment daily.  - Set and communicate daily mobility goal to care team and patient/family/caregiver.   - Collaborate with rehabilitation services on mobility goals if consulted  - Perform Range of Motion 3 times a day.  - Reposition patient every 2 hours.  - Dangle patient 3 times a day  - Stand patient 3 times a day  - Ambulate patient 3 times a day  - Out of bed to chair 3 times a day   - Out of bed for meals 3 times a day  - Out of bed for toileting  - Record patient progress and toleration of activity level   Outcome: Progressing     Problem: DISCHARGE PLANNING  Goal: Discharge to home or other facility with appropriate resources  Description: INTERVENTIONS:  - Identify barriers to discharge w/patient and caregiver  -  Arrange for needed discharge resources and transportation as appropriate  - Identify discharge learning needs (meds, wound care, etc.)  - Arrange for interpretive services to assist at discharge as needed  - Refer to Case Management Department for coordinating discharge planning if the patient needs post-hospital services based on physician/advanced practitioner order or complex needs related to functional status, cognitive ability, or social support system  Outcome: Progressing     Problem: Knowledge Deficit  Goal: Patient/family/caregiver demonstrates understanding of disease process, treatment plan, medications, and discharge instructions  Description: Complete learning assessment and assess knowledge base.  Interventions:  - Provide teaching at level of understanding  - Provide teaching via preferred learning methods  Outcome: Progressing     Problem: NEUROSENSORY - ADULT  Goal: Achieves stable or improved neurological status  Description: INTERVENTIONS  - Monitor and report changes in neurological status  - Monitor vital signs such as temperature, blood pressure, glucose, and any other labs ordered   - Initiate measures to prevent increased intracranial pressure  - Monitor for seizure activity and implement precautions if appropriate      Outcome: Progressing  Goal: Remains free of injury related to seizures activity  Description: INTERVENTIONS  - Maintain airway, patient safety  and administer oxygen as ordered  - Monitor patient for seizure activity, document and report duration and description of seizure to physician/advanced practitioner  - If seizure occurs,  ensure patient safety during seizure  - Reorient patient post seizure  - Seizure pads on all 4 side rails  - Instruct patient/family to notify RN of any seizure activity including if an aura is experienced  - Instruct patient/family to call for assistance with activity based on nursing assessment  - Administer anti-seizure medications if  ordered    Outcome: Progressing  Goal: Achieves maximal functionality and self care  Description: INTERVENTIONS  - Monitor swallowing and airway patency with patient fatigue and changes in neurological status  - Encourage and assist patient to increase activity and self care.   - Encourage visually impaired, hearing impaired and aphasic patients to use assistive/communication devices  Outcome: Progressing     Problem: CARDIOVASCULAR - ADULT  Goal: Maintains optimal cardiac output and hemodynamic stability  Description: INTERVENTIONS:  - Monitor I/O, vital signs and rhythm  - Monitor for S/S and trends of decreased cardiac output  - Administer and titrate ordered vasoactive medications to optimize hemodynamic stability  - Assess quality of pulses, skin color and temperature  - Assess for signs of decreased coronary artery perfusion  - Instruct patient to report change in severity of symptoms  Outcome: Progressing  Goal: Absence of cardiac dysrhythmias or at baseline rhythm  Description: INTERVENTIONS:  - Continuous cardiac monitoring, vital signs, obtain 12 lead EKG if ordered  - Administer antiarrhythmic and heart rate control medications as ordered  - Monitor electrolytes and administer replacement therapy as ordered  Outcome: Progressing     Problem: RESPIRATORY - ADULT  Goal: Achieves optimal ventilation and oxygenation  Description: INTERVENTIONS:  - Assess for changes in respiratory status  - Assess for changes in mentation and behavior  - Position to facilitate oxygenation and minimize respiratory effort  - Oxygen administered by appropriate delivery if ordered  - Initiate smoking cessation education as indicated  - Encourage broncho-pulmonary hygiene including cough, deep breathe, Incentive Spirometry  - Assess the need for suctioning and aspirate as needed  - Assess and instruct to report SOB or any respiratory difficulty  - Respiratory Therapy support as indicated  Outcome: Progressing     Problem:  GASTROINTESTINAL - ADULT  Goal: Minimal or absence of nausea and/or vomiting  Description: INTERVENTIONS:  - Administer IV fluids if ordered to ensure adequate hydration  - Maintain NPO status until nausea and vomiting are resolved  - Nasogastric tube if ordered  - Administer ordered antiemetic medications as needed  - Provide nonpharmacologic comfort measures as appropriate  - Advance diet as tolerated, if ordered  - Consider nutrition services referral to assist patient with adequate nutrition and appropriate food choices  Outcome: Progressing  Goal: Maintains or returns to baseline bowel function  Description: INTERVENTIONS:  - Assess bowel function  - Encourage oral fluids to ensure adequate hydration  - Administer IV fluids if ordered to ensure adequate hydration  - Administer ordered medications as needed  - Encourage mobilization and activity  - Consider nutritional services referral to assist patient with adequate nutrition and appropriate food choices  Outcome: Progressing  Goal: Maintains adequate nutritional intake  Description: INTERVENTIONS:  - Monitor percentage of each meal consumed  - Identify factors contributing to decreased intake, treat as appropriate  - Assist with meals as needed  - Monitor I&O, weight, and lab values if indicated  - Obtain nutrition services referral as needed  Outcome: Progressing  Goal: Establish and maintain optimal ostomy function  Description: INTERVENTIONS:  - Assess bowel function  - Encourage oral fluids to ensure adequate hydration  - Administer IV fluids if ordered to ensure adequate hydration   - Administer ordered medications as needed  - Encourage mobilization and activity  - Nutrition services referral to assist patient with appropriate food choices  - Assess stoma site  - Consider wound care consult   Outcome: Progressing  Goal: Oral mucous membranes remain intact  Description: INTERVENTIONS  - Assess oral mucosa and hygiene practices  - Implement preventative  oral hygiene regimen  - Implement oral medicated treatments as ordered  - Initiate Nutrition services referral as needed  Outcome: Progressing     Problem: GENITOURINARY - ADULT  Goal: Maintains or returns to baseline urinary function  Description: INTERVENTIONS:  - Assess urinary function  - Encourage oral fluids to ensure adequate hydration if ordered  - Administer IV fluids as ordered to ensure adequate hydration  - Administer ordered medications as needed  - Offer frequent toileting  - Follow urinary retention protocol if ordered  Outcome: Progressing  Goal: Absence of urinary retention  Description: INTERVENTIONS:  - Assess patient’s ability to void and empty bladder  - Monitor I/O  - Bladder scan as needed  - Discuss with physician/AP medications to alleviate retention as needed  - Discuss catheterization for long term situations as appropriate  Outcome: Progressing  Goal: Urinary catheter remains patent  Description: INTERVENTIONS:  - Assess patency of urinary catheter  - If patient has a chronic peace, consider changing catheter if non-functioning  - Follow guidelines for intermittent irrigation of non-functioning urinary catheter  Outcome: Progressing     Problem: METABOLIC, FLUID AND ELECTROLYTES - ADULT  Goal: Electrolytes maintained within normal limits  Description: INTERVENTIONS:  - Monitor labs and assess patient for signs and symptoms of electrolyte imbalances  - Administer electrolyte replacement as ordered  - Monitor response to electrolyte replacements, including repeat lab results as appropriate  - Instruct patient on fluid and nutrition as appropriate  Outcome: Progressing  Goal: Fluid balance maintained  Description: INTERVENTIONS:  - Monitor labs   - Monitor I/O and WT  - Instruct patient on fluid and nutrition as appropriate  - Assess for signs & symptoms of volume excess or deficit  Outcome: Progressing  Goal: Glucose maintained within target range  Description: INTERVENTIONS:  - Monitor  Blood Glucose as ordered  - Assess for signs and symptoms of hyperglycemia and hypoglycemia  - Administer ordered medications to maintain glucose within target range  - Assess nutritional intake and initiate nutrition service referral as needed  Outcome: Progressing     Problem: SKIN/TISSUE INTEGRITY - ADULT  Goal: Skin Integrity remains intact(Skin Breakdown Prevention)  Description: Assess:  -Perform [place] assessment   -Clean and moisturize skin   -Inspect skin when repositioning, toileting, and assisting with ADLS  -Assess under medical devices  -Assess extremities for adequate circulation and sensation     Bed Management:  -Have minimal linens on bed & keep smooth, unwrinkled  -Change linens as needed when moist or perspiring  -Avoid sitting or lying in one position for more than 3 hours while in bed  -Keep HOB at 30 degrees     Toileting:  -Offer bedside commode  -Assess for incontinence   -Use incontinent care products after each incontinent episode     Activity:  -Mobilize patient 3 times a day  -Encourage activity and walks on unit  -Encourage or provide ROM exercises   -Turn and reposition patient every 2 Hours  -Use appropriate equipment to lift or move patient in bed  -Instruct/ Assist with weight shifting when out of bed in chair  -Consider limitation of chair time 2 hour intervals    Skin Care:  -Avoid use of baby powder, tape, friction and shearing, hot water or constrictive clothing  -Relieve pressure over bony prominences   -Do not massage red bony areas    Next Steps:  -Teach patient strategies to minimize risks    -Consider consults to  interdisciplinary teams   Outcome: Progressing  Goal: Incision(s), wounds(s) or drain site(s) healing without S/S of infection  Description: INTERVENTIONS  - Assess and document dressing, incision, wound bed, drain sites and surrounding tissue  - Provide patient and family education  - Perform skin care/dressing changes  Outcome: Progressing  Goal: Pressure injury  heals and does not worsen  Description: Interventions:  - Implement low air loss mattress or specialty surface (Criteria met)  - Apply silicone foam dressing  - Instruct/assist with weight shifting when in chair   - Limit chair time to 2 hour intervals  - Use special pressure reducing interventions when in chair   - Apply fecal or urinary incontinence containment device   - Perform passive or active ROM   - Turn and reposition patient & offload bony prominences every 2 hours   - Utilize friction reducing device or surface for transfers   - Consider consults to  interdisciplinary teams   - Use incontinent care products after each incontinent episode  - Consider nutrition services referral as needed  Outcome: Progressing     Problem: HEMATOLOGIC - ADULT  Goal: Maintains hematologic stability  Description: INTERVENTIONS  - Assess for signs and symptoms of bleeding or hemorrhage  - Monitor labs  - Administer supportive blood products/factors as ordered and appropriate  Outcome: Progressing     Problem: MUSCULOSKELETAL - ADULT  Goal: Maintain or return mobility to safest level of function  Description: INTERVENTIONS:  - Assess patient's ability to carry out ADLs; assess patient's baseline for ADL function and identify physical deficits which impact ability to perform ADLs (bathing, care of mouth/teeth, toileting, grooming, dressing, etc.)  - Assess/evaluate cause of self-care deficits   - Assess range of motion  - Assess patient's mobility  - Assess patient's need for assistive devices and provide as appropriate  - Encourage maximum independence but intervene and supervise when necessary  - Involve family in performance of ADLs  - Assess for home care needs following discharge   - Consider OT consult to assist with ADL evaluation and planning for discharge  - Provide patient education as appropriate  Outcome: Progressing  Goal: Maintain proper alignment of affected body part  Description: INTERVENTIONS:  - Support,  maintain and protect limb and body alignment  - Provide patient/ family with appropriate education  Outcome: Progressing     Problem: Nutrition/Hydration-ADULT  Goal: Nutrient/Hydration intake appropriate for improving, restoring or maintaining nutritional needs  Description: Monitor and assess patient's nutrition/hydration status for malnutrition. Collaborate with interdisciplinary team and initiate plan and interventions as ordered.  Monitor patient's weight and dietary intake as ordered or per policy. Utilize nutrition screening tool and intervene as necessary. Determine patient's food preferences and provide high-protein, high-caloric foods as appropriate.     INTERVENTIONS:  - Monitor oral intake, urinary output, labs, and treatment plans  - Assess nutrition and hydration status and recommend course of action  - Evaluate amount of meals eaten  - Assist patient with eating if necessary   - Allow adequate time for meals  - Recommend/ encourage appropriate diets, oral nutritional supplements, and vitamin/mineral supplements  - Order, calculate, and assess calorie counts as needed  - Recommend, monitor, and adjust tube feedings and TPN/PPN based on assessed needs  - Assess need for intravenous fluids  - Provide specific nutrition/hydration education as appropriate  - Include patient/family/caregiver in decisions related to nutrition  Outcome: Progressing     Problem: Prexisting or High Potential for Compromised Skin Integrity  Goal: Skin integrity is maintained or improved  Description: INTERVENTIONS:  - Identify patients at risk for skin breakdown  - Assess and monitor skin integrity  - Assess and monitor nutrition and hydration status  - Monitor labs   - Assess for incontinence   - Turn and reposition patient  - Assist with mobility/ambulation  - Relieve pressure over bony prominences  - Avoid friction and shearing  - Provide appropriate hygiene as needed including keeping skin clean and dry  - Evaluate need for  skin moisturizer/barrier cream  - Collaborate with interdisciplinary team   - Patient/family teaching  - Consider wound care consult   Outcome: Progressing

## 2024-12-06 NOTE — PROGRESS NOTES
Progress Note - Hospitalist   Name: Adelina Soto 64 y.o. female I MRN: [id number]  Unit/Bed#: [facility] 708-01 I Date of Admission: 11/12/2024   Date of Service: 12/5/2024 I Hospital Day: 23    Assessment & Plan  Seizure (HCC)  Noted on admission, tonic clonic activity by significant other s/p 6 mg IV versed load.  LP unremarkable. ME panel and HSV negative but treated w acyclovir [id number].   CTH, CTA, and MRI unremarkable. PoA wishes for full treatment medically but was agreeable to DNR, yes to intubate.     Plan:  Continue 750 mg BID keppra  Continue valproate 500 mg q6h    PT OT: recommending postacute rehab. She is a 2-3 person max assist. Spoke at length w patient's caregiver and girlfriend on 12/4 and patient and girlfriend are both in agreement to go home w home PT OT/HH. Patient refuses rehab and caregiver is willing to be there around the clock to provide support. D/c is pending other medical issues - mainly warfarin bridging     Dyslipidemia  Continue atorvastatin 10 mg daily  Paroxysmal atrial fibrillation (HCC)  Noted on admission, likely due to metabolic stress from seizure, shock/hemodynamic changes. Rate controlled <110 without BB, but has been borderline w rates in 100s more recently.    Plan:  Continue 25 mg BID propanolol (given hyperthyroid labs)  Start warfarin bridge 12/4 onwards. Will need at least 3 days on drip. Target 2.0-3.0  Known cost issues w DOACs per girlfriend, so this is not an option    COPD with asthma (HCC)  Continue xopenex/atrovent   Outpatient PFTs  O2 eval closer to d/c; currently on 4L n/c. Girlfriend states pt has 3 L N/C on baseline w home O2 concentrator  Hypothyroid  At home on levothyroxine 100 mcg po qd  Lab Results   Component Value Date    [id number] 0.295 (L) 11/14/2024    FREET4 1.23 (H) 11/14/2024   Labs show slightly hyperthyroid - may be contributing to afib.    Plan:  Will slowly dose decrease to 88 mcg po qd, follow up outpt labs 4-6 weeks    GERD  (gastroesophageal reflux disease)  Continue PPI  Anxiety  Continue zoloft 50 mg daily  Morbid obesity with BMI of 50.0-59.9, adult (HCC)  Complicates all facets of care  Contributing to poor functional status  Should be started on GLP1-ag outpatient - defer to PCP  Depression, recurrent (HCC)  Continue home zoloft  CKD (chronic kidney disease)  Lab Results   Component Value Date    EGFR 46 12/05/2024    EGFR 48 12/04/2024    EGFR 44 12/03/2024    CREATININE 1.23 12/05/2024    CREATININE 1.18 12/04/2024    CREATININE 1.27 12/03/2024     No RADHA  Avoid nephrotoxins  Type 2 diabetes mellitus with hyperglycemia, unspecified whether long term insulin use (HCC)  Lab Results   Component Value Date    HGBA1C 5.7 (H) 11/13/2024     Fasting BG within goal range, no SSI needed    Blood Sugar Average: Last 72 hrs:  (P) 87.5  Remains well controlled while inpatient  On dental soft/dysphagia 3 diet w/o carb modifier - continue  Bacteriuria  Completed course of zosyn x 3d to cover for urinary infectious source with shock, AMS on presentation   Noted pyruia on admission w (+) Ucx for ESBL Proteus, and E. Coli  No further abx indicated  Acute on chronic hypoxic respiratory failure (HCC)  On 3 L NC at home per girlfriend    Negative PNA workup: CXR, bronch (resp viktoriya mixed) on 11/13. Monitor off abx  Was initially intubated for airway protection from seizure.  Now on 4 L N/C, suspect component of untreated/undiagnosed SASHA due to body habitus, plus COPD as documented elsewhere    TTE 11/14 showed LVEF 60%, moderate concentric hypertrophy, poor windows/view of RV so difficult to assess RVSP.     Plan:  Outpatient sleep study  Home O2 eval closer to discharge  Monitor off abx  Outpatient pulm f/u for formal PFTs  Resp protocol    Bacteremia due to Staphylococcus  1/2 Bcx (+) staph epi, likely contaminant - monitor off abx  Thigh hematoma, left, initial encounter  Noted 12/2. Started on IV vanc empirically 12/2 for infection. CT  consistent w hematoma  Vanc d/c'd on 12/3 by ID.    Plan:  Monitor off abx for now  Monitor WBC, temps    VTE Pharmacologic Prophylaxis: VTE Score: 6 High Risk (Score >/= 5) - Pharmacological DVT Prophylaxis Ordered: heparin drip. Sequential Compression Devices Ordered.    Mobility:   Basic Mobility Inpatient Raw Score: 8  JH-HLM Goal: 3: Sit at edge of bed  JH-HLM Achieved: 1: Laying in bed  JH-HLM Goal NOT achieved. Continue with multidisciplinary rounding and encourage appropriate mobility to improve upon -HLM goals.    Patient Centered Rounds: I performed bedside rounds with nursing staff today.   Discussions with Specialists or Other Care Team Provider: ID    Education and Discussions with Family / Patient: Updated  (girlfriend) at bedside.    Current Length of Stay: 23 day(s)  Current Patient Status: Inpatient   Certification Statement: The patient will continue to require additional inpatient hospital stay due to need for warfarin to coumadin bridge x 3+ days goal INR 2.0-3.0   Discharge Plan: Anticipate discharge in >72 hrs to home with home services.    Code Status: Level 2 - DNAR: but accepts endotracheal intubation    Subjective   Patient has no complaints today. Girlfriend at bedside reports no issues and has brought a resistance band to help patient perform some strengthening exercises this morning..      Objective :  Temp:  [97 °F (36.1 °C)-97.7 °F (36.5 °C)] 97 °F (36.1 °C)  HR:  [72-79] 75  BP: (100-137)/(68-79) 100/79  Resp:  [18-19] 18  SpO2:  [93 %-97 %] 97 %  O2 Device: Nasal cannula  Nasal Cannula O2 Flow Rate (L/min):  [3 L/min] 3 L/min    Body mass index is 45.98 kg/m².     Input and Output Summary (last 24 hours):   No intake or output data in the 24 hours ending 12/05/24 2026      Physical Exam  Vitals reviewed.   Constitutional:       General: She is not in acute distress.     Appearance: She is ill-appearing. She is not diaphoretic.   HENT:      Head: Normocephalic and  atraumatic.      Mouth/Throat:      Mouth: Mucous membranes are moist.      Pharynx: Oropharynx is clear.   Eyes:      General: No scleral icterus.  Cardiovascular:      Rate and Rhythm: Normal rate and regular rhythm.      Heart sounds: No murmur heard.     No friction rub. No gallop.   Pulmonary:      Effort: Pulmonary effort is normal. No respiratory distress.      Comments: Diminished BS b/l  Abdominal:      General: There is no distension.      Palpations: Abdomen is soft.      Tenderness: There is no abdominal tenderness. There is no guarding.   Musculoskeletal:      Right lower leg: Edema (trace) present.      Left lower leg: Edema (trace) present.   Skin:     General: Skin is warm and dry.   Psychiatric:         Mood and Affect: Mood normal.           Lines/Drains: none                Lab Results: I have reviewed the following results:   Results from last 7 days   Lab Units 12/05/24  0618 12/02/24  0555 12/01/24  0541   WBC Thousand/uL 5.42   < > 5.81   HEMOGLOBIN g/dL 9.7*   < > 8.1*   HEMATOCRIT % 33.4*   < > 28.4*   PLATELETS Thousands/uL 359   < > 394*   BANDS PCT % 1   < >  --    SEGS PCT %  --   --  67   LYMPHO PCT % 26   < > 21   MONO PCT % 9   < > 10   EOS PCT % 2   < > 0    < > = values in this interval not displayed.     Results from last 7 days   Lab Units 12/05/24  0618 12/04/24  0902   SODIUM mmol/L 143 143   POTASSIUM mmol/L 4.2 4.1   CHLORIDE mmol/L 103 106   CO2 mmol/L 36* 33*   BUN mg/dL 18 21   CREATININE mg/dL 1.23 1.18   ANION GAP mmol/L 4 4   CALCIUM mg/dL 9.8 8.7   ALBUMIN g/dL  --  2.8*   TOTAL BILIRUBIN mg/dL  --  0.30   ALK PHOS U/L  --  58   ALT U/L  --  8   AST U/L  --  12*   GLUCOSE RANDOM mg/dL 109 88     Results from last 7 days   Lab Units 12/05/24  0618   INR  1.24*     Results from last 7 days   Lab Units 12/02/24  1139 12/02/24  0114 12/01/24  1200 12/01/24  0732 11/30/24  2352 11/30/24  1704 11/30/24  1114 11/30/24  0620 11/29/24  2352 11/29/24  1710 11/29/24  1145  11/29/24  0552   POC GLUCOSE mg/dl 91 84 98 87 103 95 109 113 115 114 85 95         Results from last 7 days   Lab Units 11/28/24  2141   LACTIC ACID mmol/L 0.8       Recent Cultures (last 7 days):   Results from last 7 days   Lab Units 12/02/24  1057 11/29/24  2142   BLOOD CULTURE  No Growth at 72 hrs.  No Growth at 72 hrs.  --    GRAM STAIN RESULT   --  No Polys or Bacteria seen   WOUND CULTURE   --  No growth       Imaging Results Review: I reviewed radiology reports from this admission including: CT head.  Other Study Results Review: EKG was reviewed.     Last 24 Hours Medication List:     Current Facility-Administered Medications:     acetaminophen (TYLENOL) oral suspension 650 mg, Q4H PRN    albuterol inhalation solution 2.5 mg, Q6H PRN    atorvastatin (LIPITOR) tablet 10 mg, Daily With Dinner    guaiFENesin (ROBITUSSIN) oral liquid 200 mg, Q4H PRN    heparin (porcine) 25,000 units in 0.45% NaCl 250 mL infusion (premix), Titrated, Last Rate: 10 Units/kg/hr (12/05/24 1531)    heparin (porcine) injection 10,000 Units, Q6H PRN    heparin (porcine) injection 5,000 Units, Q6H PRN    levalbuterol (XOPENEX) inhalation solution 1.25 mg, TID    levETIRAcetam (KEPPRA) tablet 750 mg, Q12H LINO    levothyroxine tablet 88 mcg, Early Morning    lidocaine (LIDODERM) 5 % patch 2 patch, Daily    moisture barrier miconazole 2% cream (aka EARLINE MOISTURE BARRIER ANTIFUNGAL CREAM), BID    ondansetron (ZOFRAN) injection 4 mg, Q4H PRN    pantoprazole (PROTONIX) injection 40 mg, Q12H LINO    propranolol (INDERAL) oral solution 20 mg, Q12H LINO    sertraline (ZOLOFT) tablet 50 mg, Daily    sodium chloride 3 % inhalation solution 4 mL, TID    thiamine tablet 100 mg, Daily    valproic acid (DEPAKENE) oral soln 500 mg, Q6H LINO    Administrative Statements   Today, Patient Was Seen By: Thuy Son MD  I have spent a total time of 40 minutes in caring for this patient on the day of the visit/encounter including Impressions, Counseling  / Coordination of care, Documenting in the medical record, Reviewing / ordering tests, medicine, procedures  , Obtaining or reviewing history  , and Communicating with other healthcare professionals .    **Please Note: This note may have been constructed using a voice recognition system.**

## 2024-12-06 NOTE — ASSESSMENT & PLAN NOTE
Lab Results   Component Value Date    HGBA1C 5.7 (H) 11/13/2024     Fasting BG within goal range, no SSI needed    Blood Sugar Average: Last 72 hrs:    Remains well controlled while inpatient  On dental soft/dysphagia 3 diet w/o carb modifier - continue

## 2024-12-06 NOTE — ASSESSMENT & PLAN NOTE
Noted 12/2. Started on IV vanc empirically 12/2 for infection. CT consistent w hematoma  Vanc d/c'd on 12/3 by ID.  ID has signed off    Plan:  Monitor off abx for now  Monitor WBC, temps

## 2024-12-06 NOTE — ASSESSMENT & PLAN NOTE
Noted on admission, likely due to metabolic stress from seizure, shock/hemodynamic changes. Rate controlled <110 without BB, but has been borderline w rates in 100s more recently.    Recent Labs     12/04/24  1428 12/05/24  0618 12/06/24  0626   INR 1.27* 1.24* 1.60*     S/p 5 mg, 10 mg of warfarin single doses    Plan:  Continue 25 mg BID propanolol (given hyperthyroid labs)  10 mg tonight warfarin 12/6  Start warfarin bridge 12/4 onwards. Will need at least 3 days on drip. Target 2.0-3.0  Known cost issues w DOACs per girlfriend, so this is not an option

## 2024-12-07 LAB
ANION GAP SERPL CALCULATED.3IONS-SCNC: 6 MMOL/L (ref 4–13)
APTT PPP: 58 SECONDS (ref 23–34)
APTT PPP: 76 SECONDS (ref 23–34)
APTT PPP: 93 SECONDS (ref 23–34)
BACTERIA BLD CULT: NORMAL
BACTERIA BLD CULT: NORMAL
BUN SERPL-MCNC: 13 MG/DL (ref 5–25)
CALCIUM SERPL-MCNC: 9.6 MG/DL (ref 8.4–10.2)
CHLORIDE SERPL-SCNC: 101 MMOL/L (ref 96–108)
CO2 SERPL-SCNC: 35 MMOL/L (ref 21–32)
CREAT SERPL-MCNC: 1.12 MG/DL (ref 0.6–1.3)
ERYTHROCYTE [DISTWIDTH] IN BLOOD BY AUTOMATED COUNT: 22.7 % (ref 11.6–15.1)
GFR SERPL CREATININE-BSD FRML MDRD: 51 ML/MIN/1.73SQ M
GLUCOSE SERPL-MCNC: 90 MG/DL (ref 65–140)
HCT VFR BLD AUTO: 36.3 % (ref 34.8–46.1)
HGB BLD-MCNC: 10.4 G/DL (ref 11.5–15.4)
INR PPP: 3.98 (ref 0.85–1.19)
MCH RBC QN AUTO: 33.7 PG (ref 26.8–34.3)
MCHC RBC AUTO-ENTMCNC: 28.7 G/DL (ref 31.4–37.4)
MCV RBC AUTO: 118 FL (ref 82–98)
PLATELET # BLD AUTO: 294 THOUSANDS/UL (ref 149–390)
PMV BLD AUTO: 9.7 FL (ref 8.9–12.7)
POTASSIUM SERPL-SCNC: 5 MMOL/L (ref 3.5–5.3)
PROTHROMBIN TIME: 38.2 SECONDS (ref 12.3–15)
RBC # BLD AUTO: 3.09 MILLION/UL (ref 3.81–5.12)
SODIUM SERPL-SCNC: 142 MMOL/L (ref 135–147)
WBC # BLD AUTO: 5.11 THOUSAND/UL (ref 4.31–10.16)

## 2024-12-07 PROCEDURE — 85027 COMPLETE CBC AUTOMATED: CPT

## 2024-12-07 PROCEDURE — 99232 SBSQ HOSP IP/OBS MODERATE 35: CPT

## 2024-12-07 PROCEDURE — 94760 N-INVAS EAR/PLS OXIMETRY 1: CPT

## 2024-12-07 PROCEDURE — 85610 PROTHROMBIN TIME: CPT

## 2024-12-07 PROCEDURE — 80048 BASIC METABOLIC PNL TOTAL CA: CPT

## 2024-12-07 PROCEDURE — 85730 THROMBOPLASTIN TIME PARTIAL: CPT

## 2024-12-07 PROCEDURE — 94664 DEMO&/EVAL PT USE INHALER: CPT

## 2024-12-07 PROCEDURE — 94640 AIRWAY INHALATION TREATMENT: CPT

## 2024-12-07 RX ADMIN — SODIUM CHLORIDE SOLN NEBU 3% 4 ML: 3 NEBU SOLN at 20:05

## 2024-12-07 RX ADMIN — SODIUM CHLORIDE SOLN NEBU 3% 4 ML: 3 NEBU SOLN at 13:25

## 2024-12-07 RX ADMIN — PANTOPRAZOLE SODIUM 40 MG: 40 INJECTION, POWDER, FOR SOLUTION INTRAVENOUS at 08:47

## 2024-12-07 RX ADMIN — SODIUM CHLORIDE SOLN NEBU 3% 4 ML: 3 NEBU SOLN at 07:07

## 2024-12-07 RX ADMIN — VALPROIC ACID 500 MG: 500 SOLUTION ORAL at 17:36

## 2024-12-07 RX ADMIN — LEVALBUTEROL HYDROCHLORIDE 1.25 MG: 1.25 SOLUTION RESPIRATORY (INHALATION) at 13:24

## 2024-12-07 RX ADMIN — LEVETIRACETAM 750 MG: 750 TABLET, FILM COATED ORAL at 08:47

## 2024-12-07 RX ADMIN — PANTOPRAZOLE SODIUM 40 MG: 40 INJECTION, POWDER, FOR SOLUTION INTRAVENOUS at 21:55

## 2024-12-07 RX ADMIN — LEVALBUTEROL HYDROCHLORIDE 1.25 MG: 1.25 SOLUTION RESPIRATORY (INHALATION) at 07:07

## 2024-12-07 RX ADMIN — LEVALBUTEROL HYDROCHLORIDE 1.25 MG: 1.25 SOLUTION RESPIRATORY (INHALATION) at 20:05

## 2024-12-07 RX ADMIN — ATORVASTATIN CALCIUM 10 MG: 10 TABLET, FILM COATED ORAL at 17:35

## 2024-12-07 RX ADMIN — VALPROIC ACID 500 MG: 500 SOLUTION ORAL at 23:56

## 2024-12-07 RX ADMIN — LIDOCAINE 5% 2 PATCH: 700 PATCH TOPICAL at 08:50

## 2024-12-07 RX ADMIN — LEVETIRACETAM 750 MG: 750 TABLET, FILM COATED ORAL at 21:55

## 2024-12-07 RX ADMIN — VALPROIC ACID 500 MG: 500 SOLUTION ORAL at 11:39

## 2024-12-07 RX ADMIN — HEPARIN SODIUM 8 UNITS/KG/HR: 10000 INJECTION, SOLUTION INTRAVENOUS at 07:42

## 2024-12-07 RX ADMIN — SERTRALINE HYDROCHLORIDE 50 MG: 50 TABLET ORAL at 08:47

## 2024-12-07 RX ADMIN — LEVOTHYROXINE SODIUM 88 MCG: 88 TABLET ORAL at 06:18

## 2024-12-07 RX ADMIN — VALPROIC ACID 500 MG: 500 SOLUTION ORAL at 00:40

## 2024-12-07 RX ADMIN — VALPROIC ACID 500 MG: 500 SOLUTION ORAL at 06:18

## 2024-12-07 RX ADMIN — HEPARIN SODIUM 5000 UNITS: 1000 INJECTION INTRAVENOUS; SUBCUTANEOUS at 23:57

## 2024-12-07 RX ADMIN — MICONAZOLE NITRATE: 20 CREAM TOPICAL at 08:48

## 2024-12-07 RX ADMIN — THIAMINE HCL TAB 100 MG 100 MG: 100 TAB at 08:47

## 2024-12-07 RX ADMIN — MICONAZOLE NITRATE: 20 CREAM TOPICAL at 17:35

## 2024-12-07 NOTE — ASSESSMENT & PLAN NOTE
At home on levothyroxine 100 mcg po qd  Lab Results   Component Value Date    [id number] 0.295 (L) 11/14/2024    FREET4 1.23 (H) 11/14/2024   Labs show slightly hyperthyroid - may be contributing to afib.    Plan:  Will slowly dose decrease to 88 mcg po qd, follow up outpt labs 4-6 weeks

## 2024-12-07 NOTE — PLAN OF CARE
Problem: PAIN - ADULT  Goal: Verbalizes/displays adequate comfort level or baseline comfort level  Description: Interventions:  - Encourage patient to monitor pain and request assistance  - Assess pain using appropriate pain scale  - Administer analgesics based on type and severity of pain and evaluate response  - Implement non-pharmacological measures as appropriate and evaluate response  - Consider cultural and social influences on pain and pain management  - Notify physician/advanced practitioner if interventions unsuccessful or patient reports new pain  Outcome: Progressing     Problem: INFECTION - ADULT  Goal: Absence or prevention of progression during hospitalization  Description: INTERVENTIONS:  - Assess and monitor for signs and symptoms of infection  - Monitor lab/diagnostic results  - Monitor all insertion sites, i.e. indwelling lines, tubes, and drains  - Monitor endotracheal if appropriate and nasal secretions for changes in amount and color  - [place] appropriate cooling/warming therapies per order  - Administer medications as ordered  - Instruct and encourage patient and family to use good hand hygiene technique  - Identify and instruct in appropriate isolation precautions for identified infection/condition  Outcome: Progressing  Goal: Absence of fever/infection during neutropenic period  Description: INTERVENTIONS:  - Monitor WBC    Outcome: Progressing     Problem: SAFETY ADULT  Goal: Patient will remain free of falls  Description: INTERVENTIONS:  - Educate patient/family on patient safety including physical limitations  - Instruct patient to call for assistance with activity   - Consult OT/PT to assist with strengthening/mobility   - Keep Call bell within reach  - Keep bed low and locked with side rails adjusted as appropriate  - Keep care items and personal belongings within reach  - Initiate and maintain comfort rounds  - Make Fall Risk Sign visible to staff  - Offer Toileting every  Hours,  in advance of need  - Initiate/Maintain alarm  - Obtain necessary fall risk management equipment:   - Apply yellow socks and bracelet for high fall risk patients  - Consider moving patient to room near nurses station  Outcome: Progressing  Goal: Maintain or return to baseline ADL function  Description: INTERVENTIONS:  -  Assess patient's ability to carry out ADLs; assess patient's baseline for ADL function and identify physical deficits which impact ability to perform ADLs (bathing, care of mouth/teeth, toileting, grooming, dressing, etc.)  - Assess/evaluate cause of self-care deficits   - Assess range of motion  - Assess patient's mobility; develop plan if impaired  - Assess patient's need for assistive devices and provide as appropriate  - Encourage maximum independence but intervene and supervise when necessary  - Involve family in performance of ADLs  - Assess for home care needs following discharge   - Consider OT consult to assist with ADL evaluation and planning for discharge  - Provide patient education as appropriate  Outcome: Progressing  Goal: Maintains/Returns to pre admission functional level  Description: INTERVENTIONS:  - Perform AM-PAC 6 Click Basic Mobility/ Daily Activity assessment daily.  - Set and communicate daily mobility goal to care team and patient/family/caregiver.   - Collaborate with rehabilitation services on mobility goals if consulted  - Perform Range of Motion  times a day.  - Reposition patient every  hours.  - Dangle patient  times a day  - Stand patient  times a day  - Ambulate patient  times a day  - Out of bed to chair  times a day   - Out of bed for meals  times a day  - Out of bed for toileting  - Record patient progress and toleration of activity level   Outcome: Progressing     Problem: DISCHARGE PLANNING  Goal: Discharge to home or other facility with appropriate resources  Description: INTERVENTIONS:  - Identify barriers to discharge w/patient and caregiver  - Arrange for  needed discharge resources and transportation as appropriate  - Identify discharge learning needs (meds, wound care, etc.)  - Arrange for interpretive services to assist at discharge as needed  - Refer to Case Management Department for coordinating discharge planning if the patient needs post-hospital services based on physician/advanced practitioner order or complex needs related to functional status, cognitive ability, or social support system  Outcome: Progressing     Problem: Knowledge Deficit  Goal: Patient/family/caregiver demonstrates understanding of disease process, treatment plan, medications, and discharge instructions  Description: Complete learning assessment and assess knowledge base.  Interventions:  - Provide teaching at level of understanding  - Provide teaching via preferred learning methods  Outcome: Progressing     Problem: NEUROSENSORY - ADULT  Goal: Achieves stable or improved neurological status  Description: INTERVENTIONS  - Monitor and report changes in neurological status  - Monitor vital signs such as temperature, blood pressure, glucose, and any other labs ordered   - Initiate measures to prevent increased intracranial pressure  - Monitor for seizure activity and implement precautions if appropriate      Outcome: Progressing  Goal: Remains free of injury related to seizures activity  Description: INTERVENTIONS  - Maintain airway, patient safety  and administer oxygen as ordered  - Monitor patient for seizure activity, document and report duration and description of seizure to physician/advanced practitioner  - If seizure occurs,  ensure patient safety during seizure  - Reorient patient post seizure  - Seizure pads on all 4 side rails  - Instruct patient/family to notify RN of any seizure activity including if an aura is experienced  - Instruct patient/family to call for assistance with activity based on nursing assessment  - Administer anti-seizure medications if ordered    Outcome:  Progressing  Goal: Achieves maximal functionality and self care  Description: INTERVENTIONS  - Monitor swallowing and airway patency with patient fatigue and changes in neurological status  - Encourage and assist patient to increase activity and self care.   - Encourage visually impaired, hearing impaired and aphasic patients to use assistive/communication devices  Outcome: Progressing     Problem: CARDIOVASCULAR - ADULT  Goal: Maintains optimal cardiac output and hemodynamic stability  Description: INTERVENTIONS:  - Monitor I/O, vital signs and rhythm  - Monitor for S/S and trends of decreased cardiac output  - Administer and titrate ordered vasoactive medications to optimize hemodynamic stability  - Assess quality of pulses, skin color and temperature  - Assess for signs of decreased coronary artery perfusion  - Instruct patient to report change in severity of symptoms  Outcome: Progressing  Goal: Absence of cardiac dysrhythmias or at baseline rhythm  Description: INTERVENTIONS:  - Continuous cardiac monitoring, vital signs, obtain 12 lead EKG if ordered  - Administer antiarrhythmic and heart rate control medications as ordered  - Monitor electrolytes and administer replacement therapy as ordered  Outcome: Progressing     Problem: RESPIRATORY - ADULT  Goal: Achieves optimal ventilation and oxygenation  Description: INTERVENTIONS:  - Assess for changes in respiratory status  - Assess for changes in mentation and behavior  - Position to facilitate oxygenation and minimize respiratory effort  - Oxygen administered by appropriate delivery if ordered  - Initiate smoking cessation education as indicated  - Encourage broncho-pulmonary hygiene including cough, deep breathe, Incentive Spirometry  - Assess the need for suctioning and aspirate as needed  - Assess and instruct to report SOB or any respiratory difficulty  - Respiratory Therapy support as indicated  Outcome: Progressing     Problem: GASTROINTESTINAL -  ADULT  Goal: Minimal or absence of nausea and/or vomiting  Description: INTERVENTIONS:  - Administer IV fluids if ordered to ensure adequate hydration  - Maintain NPO status until nausea and vomiting are resolved  - Nasogastric tube if ordered  - Administer ordered antiemetic medications as needed  - Provide nonpharmacologic comfort measures as appropriate  - Advance diet as tolerated, if ordered  - Consider nutrition services referral to assist patient with adequate nutrition and appropriate food choices  Outcome: Progressing  Goal: Maintains or returns to baseline bowel function  Description: INTERVENTIONS:  - Assess bowel function  - Encourage oral fluids to ensure adequate hydration  - Administer IV fluids if ordered to ensure adequate hydration  - Administer ordered medications as needed  - Encourage mobilization and activity  - Consider nutritional services referral to assist patient with adequate nutrition and appropriate food choices  Outcome: Progressing  Goal: Maintains adequate nutritional intake  Description: INTERVENTIONS:  - Monitor percentage of each meal consumed  - Identify factors contributing to decreased intake, treat as appropriate  - Assist with meals as needed  - Monitor I&O, weight, and lab values if indicated  - Obtain nutrition services referral as needed  Outcome: Progressing  Goal: Establish and maintain optimal ostomy function  Description: INTERVENTIONS:  - Assess bowel function  - Encourage oral fluids to ensure adequate hydration  - Administer IV fluids if ordered to ensure adequate hydration   - Administer ordered medications as needed  - Encourage mobilization and activity  - Nutrition services referral to assist patient with appropriate food choices  - Assess stoma site  - Consider wound care consult   Outcome: Progressing  Goal: Oral mucous membranes remain intact  Description: INTERVENTIONS  - Assess oral mucosa and hygiene practices  - Implement preventative oral hygiene  regimen  - Implement oral medicated treatments as ordered  - Initiate Nutrition services referral as needed  Outcome: Progressing     Problem: GENITOURINARY - ADULT  Goal: Maintains or returns to baseline urinary function  Description: INTERVENTIONS:  - Assess urinary function  - Encourage oral fluids to ensure adequate hydration if ordered  - Administer IV fluids as ordered to ensure adequate hydration  - Administer ordered medications as needed  - Offer frequent toileting  - Follow urinary retention protocol if ordered  Outcome: Progressing  Goal: Absence of urinary retention  Description: INTERVENTIONS:  - Assess patient’s ability to void and empty bladder  - Monitor I/O  - Bladder scan as needed  - Discuss with physician/AP medications to alleviate retention as needed  - Discuss catheterization for long term situations as appropriate  Outcome: Progressing  Goal: Urinary catheter remains patent  Description: INTERVENTIONS:  - Assess patency of urinary catheter  - If patient has a chronic peace, consider changing catheter if non-functioning  - Follow guidelines for intermittent irrigation of non-functioning urinary catheter  Outcome: Progressing     Problem: METABOLIC, FLUID AND ELECTROLYTES - ADULT  Goal: Electrolytes maintained within normal limits  Description: INTERVENTIONS:  - Monitor labs and assess patient for signs and symptoms of electrolyte imbalances  - Administer electrolyte replacement as ordered  - Monitor response to electrolyte replacements, including repeat lab results as appropriate  - Instruct patient on fluid and nutrition as appropriate  Outcome: Progressing  Goal: Fluid balance maintained  Description: INTERVENTIONS:  - Monitor labs   - Monitor I/O and WT  - Instruct patient on fluid and nutrition as appropriate  - Assess for signs & symptoms of volume excess or deficit  Outcome: Progressing  Goal: Glucose maintained within target range  Description: INTERVENTIONS:  - Monitor Blood Glucose as  ordered  - Assess for signs and symptoms of hyperglycemia and hypoglycemia  - Administer ordered medications to maintain glucose within target range  - Assess nutritional intake and initiate nutrition service referral as needed  Outcome: Progressing     Problem: SKIN/TISSUE INTEGRITY - ADULT  Goal: Skin Integrity remains intact(Skin Breakdown Prevention)  Description: Assess:  -Perform [place] assessment every   -Clean and moisturize skin every   -Inspect skin when repositioning, toileting, and assisting with ADLS  -Assess under medical devices such as  every   -Assess extremities for adequate circulation and sensation     Bed Management:  -Have minimal linens on bed & keep smooth, unwrinkled  -Change linens as needed when moist or perspiring  -Avoid sitting or lying in one position for more than  hours while in bed  -Keep HOB at degrees     Toileting:  -Offer bedside commode  -Assess for incontinence every   -Use incontinent care products after each incontinent episode such as     Activity:  -Mobilize patient  times a day  -Encourage activity and walks on unit  -Encourage or provide ROM exercises   -Turn and reposition patient every  Hours  -Use appropriate equipment to lift or move patient in bed  -Instruct/ Assist with weight shifting every  when out of bed in chair  -Consider limitation of chair time  hour intervals    Skin Care:  -Avoid use of baby powder, tape, friction and shearing, hot water or constrictive clothing  -Relieve pressure over bony prominences using   -Do not massage red bony areas    Next Steps:  -Teach patient strategies to minimize risks such as    -Consider consults to  interdisciplinary teams such as   Outcome: Progressing  Goal: Incision(s), wounds(s) or drain site(s) healing without S/S of infection  Description: INTERVENTIONS  - Assess and document dressing, incision, wound bed, drain sites and surrounding tissue  - Provide patient and family education  - Perform skin care/dressing  changes every   Outcome: Progressing  Goal: Pressure injury heals and does not worsen  Description: Interventions:  - Implement low air loss mattress or specialty surface (Criteria met)  - Apply silicone foam dressing  - Instruct/assist with weight shifting every  minutes when in chair   - Limit chair time to  hour intervals  - Use special pressure reducing interventions such as  when in chair   - Apply fecal or urinary incontinence containment device   - Perform passive or active ROM every   - Turn and reposition patient & offload bony prominences every  hours   - Utilize friction reducing device or surface for transfers   - Consider consults to  interdisciplinary teams such as   - Use incontinent care products after each incontinent episode such as   - Consider nutrition services referral as needed  Outcome: Progressing     Problem: HEMATOLOGIC - ADULT  Goal: Maintains hematologic stability  Description: INTERVENTIONS  - Assess for signs and symptoms of bleeding or hemorrhage  - Monitor labs  - Administer supportive blood products/factors as ordered and appropriate  Outcome: Progressing     Problem: MUSCULOSKELETAL - ADULT  Goal: Maintain or return mobility to safest level of function  Description: INTERVENTIONS:  - Assess patient's ability to carry out ADLs; assess patient's baseline for ADL function and identify physical deficits which impact ability to perform ADLs (bathing, care of mouth/teeth, toileting, grooming, dressing, etc.)  - Assess/evaluate cause of self-care deficits   - Assess range of motion  - Assess patient's mobility  - Assess patient's need for assistive devices and provide as appropriate  - Encourage maximum independence but intervene and supervise when necessary  - Involve family in performance of ADLs  - Assess for home care needs following discharge   - Consider OT consult to assist with ADL evaluation and planning for discharge  - Provide patient education as appropriate  Outcome:  Progressing  Goal: Maintain proper alignment of affected body part  Description: INTERVENTIONS:  - Support, maintain and protect limb and body alignment  - Provide patient/ family with appropriate education  Outcome: Progressing     Problem: Nutrition/Hydration-ADULT  Goal: Nutrient/Hydration intake appropriate for improving, restoring or maintaining nutritional needs  Description: Monitor and assess patient's nutrition/hydration status for malnutrition. Collaborate with interdisciplinary team and initiate plan and interventions as ordered.  Monitor patient's weight and dietary intake as ordered or per policy. Utilize nutrition screening tool and intervene as necessary. Determine patient's food preferences and provide high-protein, high-caloric foods as appropriate.     INTERVENTIONS:  - Monitor oral intake, urinary output, labs, and treatment plans  - Assess nutrition and hydration status and recommend course of action  - Evaluate amount of meals eaten  - Assist patient with eating if necessary   - Allow adequate time for meals  - Recommend/ encourage appropriate diets, oral nutritional supplements, and vitamin/mineral supplements  - Order, calculate, and assess calorie counts as needed  - Recommend, monitor, and adjust tube feedings and TPN/PPN based on assessed needs  - Assess need for intravenous fluids  - Provide specific nutrition/hydration education as appropriate  - Include patient/family/caregiver in decisions related to nutrition  Outcome: Progressing     Problem: Prexisting or High Potential for Compromised Skin Integrity  Goal: Skin integrity is maintained or improved  Description: INTERVENTIONS:  - Identify patients at risk for skin breakdown  - Assess and monitor skin integrity  - Assess and monitor nutrition and hydration status  - Monitor labs   - Assess for incontinence   - Turn and reposition patient  - Assist with mobility/ambulation  - Relieve pressure over bony prominences  - Avoid friction and  shearing  - Provide appropriate hygiene as needed including keeping skin clean and dry  - Evaluate need for skin moisturizer/barrier cream  - Collaborate with interdisciplinary team   - Patient/family teaching  - Consider wound care consult   Outcome: Progressing

## 2024-12-07 NOTE — ASSESSMENT & PLAN NOTE
Noted on admission, tonic clonic activity by significant other s/p 6 mg IV versed load.  LP unremarkable. ME panel and HSV negative but treated w acyclovir [id number].   CTH, CTA, and MRI unremarkable. PoA wishes for full treatment medically but was agreeable to DNR, yes to intubate.     Plan:  Continue 750 mg BID keppra  Continue valproate 500 mg q6h    PT OT: recommending postacute rehab. She is a 2-3 person max assist. Spoke at length w patient's caregiver and girlfriend on 12/4 and patient and girlfriend are both in agreement to go home w home PT OT/HH. Patient refuses rehab and caregiver is willing to be there around the clock to provide support. D/c is pending other medical issues - mainly warfarin bridging

## 2024-12-07 NOTE — PLAN OF CARE
Problem: PAIN - ADULT  Goal: Verbalizes/displays adequate comfort level or baseline comfort level  Description: Interventions:  - Encourage patient to monitor pain and request assistance  - Assess pain using appropriate pain scale  - Administer analgesics based on type and severity of pain and evaluate response  - Implement non-pharmacological measures as appropriate and evaluate response  - Consider cultural and social influences on pain and pain management  - Notify physician/advanced practitioner if interventions unsuccessful or patient reports new pain  Outcome: Progressing     Problem: INFECTION - ADULT  Goal: Absence or prevention of progression during hospitalization  Description: INTERVENTIONS:  - Assess and monitor for signs and symptoms of infection  - Monitor lab/diagnostic results  - Monitor all insertion sites, i.e. indwelling lines, tubes, and drains  - Monitor endotracheal if appropriate and nasal secretions for changes in amount and color  - [place] appropriate cooling/warming therapies per order  - Administer medications as ordered  - Instruct and encourage patient and family to use good hand hygiene technique  - Identify and instruct in appropriate isolation precautions for identified infection/condition  Outcome: Progressing  Goal: Absence of fever/infection during neutropenic period  Description: INTERVENTIONS:  - Monitor WBC    Outcome: Progressing     Problem: SAFETY ADULT  Goal: Patient will remain free of falls  Description: INTERVENTIONS:  - Educate patient/family on patient safety including physical limitations  - Instruct patient to call for assistance with activity   - Consult OT/PT to assist with strengthening/mobility   - Keep Call bell within reach  - Keep bed low and locked with side rails adjusted as appropriate  - Keep care items and personal belongings within reach  - Initiate and maintain comfort rounds  - Make Fall Risk Sign visible to staff  - Offer Toileting every 2 Hours,  in advance of need  - Initiate/Maintain bed alarm  - Apply yellow socks and bracelet for high fall risk patients  - Consider moving patient to room near nurses station  Outcome: Progressing  Goal: Maintain or return to baseline ADL function  Description: INTERVENTIONS:  -  Assess patient's ability to carry out ADLs; assess patient's baseline for ADL function and identify physical deficits which impact ability to perform ADLs (bathing, care of mouth/teeth, toileting, grooming, dressing, etc.)  - Assess/evaluate cause of self-care deficits   - Assess range of motion  - Assess patient's mobility; develop plan if impaired  - Assess patient's need for assistive devices and provide as appropriate  - Encourage maximum independence but intervene and supervise when necessary  - Involve family in performance of ADLs  - Assess for home care needs following discharge   - Consider OT consult to assist with ADL evaluation and planning for discharge  - Provide patient education as appropriate  Outcome: Progressing  Goal: Maintains/Returns to pre admission functional level  Description: INTERVENTIONS:  - Perform AM-PAC 6 Click Basic Mobility/ Daily Activity assessment daily.  - Set and communicate daily mobility goal to care team and patient/family/caregiver.   - Collaborate with rehabilitation services on mobility goals if consulted  - Perform Range of Motion 3 times a day.  - Reposition patient every 2 hours.  - Dangle patient 3 times a day  - Stand patient 2 times a day  - Ambulate patient 3 times a day  - Out of bed to chair 2 times a day   - Out of bed for meals 3 times a day  - Out of bed for toileting  - Record patient progress and toleration of activity level   Outcome: Progressing     Problem: DISCHARGE PLANNING  Goal: Discharge to home or other facility with appropriate resources  Description: INTERVENTIONS:  - Identify barriers to discharge w/patient and caregiver  - Arrange for needed discharge resources and  transportation as appropriate  - Identify discharge learning needs (meds, wound care, etc.)  - Arrange for interpretive services to assist at discharge as needed  - Refer to Case Management Department for coordinating discharge planning if the patient needs post-hospital services based on physician/advanced practitioner order or complex needs related to functional status, cognitive ability, or social support system  Outcome: Progressing     Problem: Knowledge Deficit  Goal: Patient/family/caregiver demonstrates understanding of disease process, treatment plan, medications, and discharge instructions  Description: Complete learning assessment and assess knowledge base.  Interventions:  - Provide teaching at level of understanding  - Provide teaching via preferred learning methods  Outcome: Progressing     Problem: NEUROSENSORY - ADULT  Goal: Achieves stable or improved neurological status  Description: INTERVENTIONS  - Monitor and report changes in neurological status  - Monitor vital signs such as temperature, blood pressure, glucose, and any other labs ordered   - Initiate measures to prevent increased intracranial pressure  - Monitor for seizure activity and implement precautions if appropriate      Outcome: Progressing  Goal: Remains free of injury related to seizures activity  Description: INTERVENTIONS  - Maintain airway, patient safety  and administer oxygen as ordered  - Monitor patient for seizure activity, document and report duration and description of seizure to physician/advanced practitioner  - If seizure occurs,  ensure patient safety during seizure  - Reorient patient post seizure  - Seizure pads on all 4 side rails  - Instruct patient/family to notify RN of any seizure activity including if an aura is experienced  - Instruct patient/family to call for assistance with activity based on nursing assessment  - Administer anti-seizure medications if ordered    Outcome: Progressing  Goal: Achieves maximal  functionality and self care  Description: INTERVENTIONS  - Monitor swallowing and airway patency with patient fatigue and changes in neurological status  - Encourage and assist patient to increase activity and self care.   - Encourage visually impaired, hearing impaired and aphasic patients to use assistive/communication devices  Outcome: Progressing     Problem: CARDIOVASCULAR - ADULT  Goal: Maintains optimal cardiac output and hemodynamic stability  Description: INTERVENTIONS:  - Monitor I/O, vital signs and rhythm  - Monitor for S/S and trends of decreased cardiac output  - Administer and titrate ordered vasoactive medications to optimize hemodynamic stability  - Assess quality of pulses, skin color and temperature  - Assess for signs of decreased coronary artery perfusion  - Instruct patient to report change in severity of symptoms  Outcome: Progressing  Goal: Absence of cardiac dysrhythmias or at baseline rhythm  Description: INTERVENTIONS:  - Continuous cardiac monitoring, vital signs, obtain 12 lead EKG if ordered  - Administer antiarrhythmic and heart rate control medications as ordered  - Monitor electrolytes and administer replacement therapy as ordered  Outcome: Progressing     Problem: RESPIRATORY - ADULT  Goal: Achieves optimal ventilation and oxygenation  Description: INTERVENTIONS:  - Assess for changes in respiratory status  - Assess for changes in mentation and behavior  - Position to facilitate oxygenation and minimize respiratory effort  - Oxygen administered by appropriate delivery if ordered  - Initiate smoking cessation education as indicated  - Encourage broncho-pulmonary hygiene including cough, deep breathe, Incentive Spirometry  - Assess the need for suctioning and aspirate as needed  - Assess and instruct to report SOB or any respiratory difficulty  - Respiratory Therapy support as indicated  Outcome: Progressing     Problem: GASTROINTESTINAL - ADULT  Goal: Minimal or absence of nausea  and/or vomiting  Description: INTERVENTIONS:  - Administer IV fluids if ordered to ensure adequate hydration  - Maintain NPO status until nausea and vomiting are resolved  - Nasogastric tube if ordered  - Administer ordered antiemetic medications as needed  - Provide nonpharmacologic comfort measures as appropriate  - Advance diet as tolerated, if ordered  - Consider nutrition services referral to assist patient with adequate nutrition and appropriate food choices  Outcome: Progressing  Goal: Maintains or returns to baseline bowel function  Description: INTERVENTIONS:  - Assess bowel function  - Encourage oral fluids to ensure adequate hydration  - Administer IV fluids if ordered to ensure adequate hydration  - Administer ordered medications as needed  - Encourage mobilization and activity  - Consider nutritional services referral to assist patient with adequate nutrition and appropriate food choices  Outcome: Progressing  Goal: Maintains adequate nutritional intake  Description: INTERVENTIONS:  - Monitor percentage of each meal consumed  - Identify factors contributing to decreased intake, treat as appropriate  - Assist with meals as needed  - Monitor I&O, weight, and lab values if indicated  - Obtain nutrition services referral as needed  Outcome: Progressing  Goal: Establish and maintain optimal ostomy function  Description: INTERVENTIONS:  - Assess bowel function  - Encourage oral fluids to ensure adequate hydration  - Administer IV fluids if ordered to ensure adequate hydration   - Administer ordered medications as needed  - Encourage mobilization and activity  - Nutrition services referral to assist patient with appropriate food choices  - Assess stoma site  - Consider wound care consult   Outcome: Progressing  Goal: Oral mucous membranes remain intact  Description: INTERVENTIONS  - Assess oral mucosa and hygiene practices  - Implement preventative oral hygiene regimen  - Implement oral medicated treatments as  ordered  - Initiate Nutrition services referral as needed  Outcome: Progressing     Problem: GENITOURINARY - ADULT  Goal: Maintains or returns to baseline urinary function  Description: INTERVENTIONS:  - Assess urinary function  - Encourage oral fluids to ensure adequate hydration if ordered  - Administer IV fluids as ordered to ensure adequate hydration  - Administer ordered medications as needed  - Offer frequent toileting  - Follow urinary retention protocol if ordered  Outcome: Progressing  Goal: Absence of urinary retention  Description: INTERVENTIONS:  - Assess patient’s ability to void and empty bladder  - Monitor I/O  - Bladder scan as needed  - Discuss with physician/AP medications to alleviate retention as needed  - Discuss catheterization for long term situations as appropriate  Outcome: Progressing  Goal: Urinary catheter remains patent  Description: INTERVENTIONS:  - Assess patency of urinary catheter  - If patient has a chronic peace, consider changing catheter if non-functioning  - Follow guidelines for intermittent irrigation of non-functioning urinary catheter  Outcome: Progressing     Problem: METABOLIC, FLUID AND ELECTROLYTES - ADULT  Goal: Electrolytes maintained within normal limits  Description: INTERVENTIONS:  - Monitor labs and assess patient for signs and symptoms of electrolyte imbalances  - Administer electrolyte replacement as ordered  - Monitor response to electrolyte replacements, including repeat lab results as appropriate  - Instruct patient on fluid and nutrition as appropriate  Outcome: Progressing  Goal: Fluid balance maintained  Description: INTERVENTIONS:  - Monitor labs   - Monitor I/O and WT  - Instruct patient on fluid and nutrition as appropriate  - Assess for signs & symptoms of volume excess or deficit  Outcome: Progressing  Goal: Glucose maintained within target range  Description: INTERVENTIONS:  - Monitor Blood Glucose as ordered  - Assess for signs and symptoms of  hyperglycemia and hypoglycemia  - Administer ordered medications to maintain glucose within target range  - Assess nutritional intake and initiate nutrition service referral as needed  Outcome: Progressing     Problem: SKIN/TISSUE INTEGRITY - ADULT  Goal: Skin Integrity remains intact(Skin Breakdown Prevention)  Description: Assess:  -Perform [place] assessment every shift  -Clean and moisturize skin  -Inspect skin when repositioning, toileting, and assisting with ADLS  -Assess extremities for adequate circulation and sensation     Bed Management:  -Have minimal linens on bed & keep smooth, unwrinkled  -Change linens as needed when moist or perspiring  -Avoid sitting or lying in one position for more than 2 hours while in bed    Toileting:  -Offer bedside commode  -Assess for incontinence    Activity:  -Encourage activity and walks on unit  -Encourage or provide ROM exercises   -Turn and reposition patient every 2 Hours  -Use appropriate equipment to lift or move patient in bed    Skin Care:  -Avoid use of baby powder, tape, friction and shearing, hot water or constrictive clothing  -Relieve pressure over bony prominences  -Do not massage red bony areas    Outcome: Progressing  Goal: Incision(s), wounds(s) or drain site(s) healing without S/S of infection  Description: INTERVENTIONS  - Assess and document dressing, incision, wound bed, drain sites and surrounding tissue  - Provide patient and family education  Outcome: Progressing  Goal: Pressure injury heals and does not worsen  Description: Interventions:  - Implement low air loss mattress or specialty surface (Criteria met)  - Apply silicone foam dressing  - Apply fecal or urinary incontinence containment device   - Turn and reposition patient & offload bony prominences every 2 hours   - Utilize friction reducing device or surface for transfers   - Use incontinent care products after each incontinent episode.  - Consider nutrition services referral as  needed  Outcome: Progressing     Problem: HEMATOLOGIC - ADULT  Goal: Maintains hematologic stability  Description: INTERVENTIONS  - Assess for signs and symptoms of bleeding or hemorrhage  - Monitor labs  - Administer supportive blood products/factors as ordered and appropriate  Outcome: Progressing     Problem: MUSCULOSKELETAL - ADULT  Goal: Maintain or return mobility to safest level of function  Description: INTERVENTIONS:  - Assess patient's ability to carry out ADLs; assess patient's baseline for ADL function and identify physical deficits which impact ability to perform ADLs (bathing, care of mouth/teeth, toileting, grooming, dressing, etc.)  - Assess/evaluate cause of self-care deficits   - Assess range of motion  - Assess patient's mobility  - Assess patient's need for assistive devices and provide as appropriate  - Encourage maximum independence but intervene and supervise when necessary  - Involve family in performance of ADLs  - Assess for home care needs following discharge   - Consider OT consult to assist with ADL evaluation and planning for discharge  - Provide patient education as appropriate  Outcome: Progressing  Goal: Maintain proper alignment of affected body part  Description: INTERVENTIONS:  - Support, maintain and protect limb and body alignment  - Provide patient/ family with appropriate education  Outcome: Progressing     Problem: Nutrition/Hydration-ADULT  Goal: Nutrient/Hydration intake appropriate for improving, restoring or maintaining nutritional needs  Description: Monitor and assess patient's nutrition/hydration status for malnutrition. Collaborate with interdisciplinary team and initiate plan and interventions as ordered.  Monitor patient's weight and dietary intake as ordered or per policy. Utilize nutrition screening tool and intervene as necessary. Determine patient's food preferences and provide high-protein, high-caloric foods as appropriate.     INTERVENTIONS:  - Monitor oral  intake, urinary output, labs, and treatment plans  - Assess nutrition and hydration status and recommend course of action  - Evaluate amount of meals eaten  - Assist patient with eating if necessary   - Allow adequate time for meals  - Recommend/ encourage appropriate diets, oral nutritional supplements, and vitamin/mineral supplements  - Order, calculate, and assess calorie counts as needed  - Recommend, monitor, and adjust tube feedings and TPN/PPN based on assessed needs  - Assess need for intravenous fluids  - Provide specific nutrition/hydration education as appropriate  - Include patient/family/caregiver in decisions related to nutrition  Outcome: Progressing     Problem: Prexisting or High Potential for Compromised Skin Integrity  Goal: Skin integrity is maintained or improved  Description: INTERVENTIONS:  - Identify patients at risk for skin breakdown  - Assess and monitor skin integrity  - Assess and monitor nutrition and hydration status  - Monitor labs   - Assess for incontinence   - Turn and reposition patient  - Assist with mobility/ambulation  - Relieve pressure over bony prominences  - Avoid friction and shearing  - Provide appropriate hygiene as needed including keeping skin clean and dry  - Evaluate need for skin moisturizer/barrier cream  - Collaborate with interdisciplinary team   - Patient/family teaching  - Consider wound care consult   Outcome: Progressing     Problem: PAIN - ADULT  Goal: Verbalizes/displays adequate comfort level or baseline comfort level  Description: Interventions:  - Encourage patient to monitor pain and request assistance  - Assess pain using appropriate pain scale  - Administer analgesics based on type and severity of pain and evaluate response  - Implement non-pharmacological measures as appropriate and evaluate response  - Consider cultural and social influences on pain and pain management  - Notify physician/advanced practitioner if interventions unsuccessful or  patient reports new pain  Outcome: Progressing     Problem: INFECTION - ADULT  Goal: Absence or prevention of progression during hospitalization  Description: INTERVENTIONS:  - Assess and monitor for signs and symptoms of infection  - Monitor lab/diagnostic results  - Monitor all insertion sites, i.e. indwelling lines, tubes, and drains  - Monitor endotracheal if appropriate and nasal secretions for changes in amount and color  - [place] appropriate cooling/warming therapies per order  - Administer medications as ordered  - Instruct and encourage patient and family to use good hand hygiene technique  - Identify and instruct in appropriate isolation precautions for identified infection/condition  Outcome: Progressing  Goal: Absence of fever/infection during neutropenic period  Description: INTERVENTIONS:  - Monitor WBC    Outcome: Progressing     Problem: SAFETY ADULT  Goal: Patient will remain free of falls  Description: INTERVENTIONS:  - Educate patient/family on patient safety including physical limitations  - Instruct patient to call for assistance with activity   - Consult OT/PT to assist with strengthening/mobility   - Keep Call bell within reach  - Keep bed low and locked with side rails adjusted as appropriate  - Keep care items and personal belongings within reach  - Initiate and maintain comfort rounds  - Make Fall Risk Sign visible to staff  - Offer Toileting every 2 Hours, in advance of need  - Initiate/Maintain bed alarm  - Apply yellow socks and bracelet for high fall risk patients  - Consider moving patient to room near nurses station  Outcome: Progressing  Goal: Maintain or return to baseline ADL function  Description: INTERVENTIONS:  -  Assess patient's ability to carry out ADLs; assess patient's baseline for ADL function and identify physical deficits which impact ability to perform ADLs (bathing, care of mouth/teeth, toileting, grooming, dressing, etc.)  - Assess/evaluate cause of self-care  deficits   - Assess range of motion  - Assess patient's mobility; develop plan if impaired  - Assess patient's need for assistive devices and provide as appropriate  - Encourage maximum independence but intervene and supervise when necessary  - Involve family in performance of ADLs  - Assess for home care needs following discharge   - Consider OT consult to assist with ADL evaluation and planning for discharge  - Provide patient education as appropriate  Outcome: Progressing  Goal: Maintains/Returns to pre admission functional level  Description: INTERVENTIONS:  - Perform AM-PAC 6 Click Basic Mobility/ Daily Activity assessment daily.  - Set and communicate daily mobility goal to care team and patient/family/caregiver.   - Collaborate with rehabilitation services on mobility goals if consulted  - Perform Range of Motion 3 times a day.  - Reposition patient every 2 hours.  - Dangle patient 3 times a day  - Stand patient 2 times a day  - Ambulate patient 3 times a day  - Out of bed to chair 2 times a day   - Out of bed for meals 3 times a day  - Out of bed for toileting  - Record patient progress and toleration of activity level   Outcome: Progressing     Problem: DISCHARGE PLANNING  Goal: Discharge to home or other facility with appropriate resources  Description: INTERVENTIONS:  - Identify barriers to discharge w/patient and caregiver  - Arrange for needed discharge resources and transportation as appropriate  - Identify discharge learning needs (meds, wound care, etc.)  - Arrange for interpretive services to assist at discharge as needed  - Refer to Case Management Department for coordinating discharge planning if the patient needs post-hospital services based on physician/advanced practitioner order or complex needs related to functional status, cognitive ability, or social support system  Outcome: Progressing     Problem: Knowledge Deficit  Goal: Patient/family/caregiver demonstrates understanding of disease  process, treatment plan, medications, and discharge instructions  Description: Complete learning assessment and assess knowledge base.  Interventions:  - Provide teaching at level of understanding  - Provide teaching via preferred learning methods  Outcome: Progressing     Problem: NEUROSENSORY - ADULT  Goal: Achieves stable or improved neurological status  Description: INTERVENTIONS  - Monitor and report changes in neurological status  - Monitor vital signs such as temperature, blood pressure, glucose, and any other labs ordered   - Initiate measures to prevent increased intracranial pressure  - Monitor for seizure activity and implement precautions if appropriate      Outcome: Progressing  Goal: Remains free of injury related to seizures activity  Description: INTERVENTIONS  - Maintain airway, patient safety  and administer oxygen as ordered  - Monitor patient for seizure activity, document and report duration and description of seizure to physician/advanced practitioner  - If seizure occurs,  ensure patient safety during seizure  - Reorient patient post seizure  - Seizure pads on all 4 side rails  - Instruct patient/family to notify RN of any seizure activity including if an aura is experienced  - Instruct patient/family to call for assistance with activity based on nursing assessment  - Administer anti-seizure medications if ordered    Outcome: Progressing  Goal: Achieves maximal functionality and self care  Description: INTERVENTIONS  - Monitor swallowing and airway patency with patient fatigue and changes in neurological status  - Encourage and assist patient to increase activity and self care.   - Encourage visually impaired, hearing impaired and aphasic patients to use assistive/communication devices  Outcome: Progressing     Problem: CARDIOVASCULAR - ADULT  Goal: Maintains optimal cardiac output and hemodynamic stability  Description: INTERVENTIONS:  - Monitor I/O, vital signs and rhythm  - Monitor for S/S  and trends of decreased cardiac output  - Administer and titrate ordered vasoactive medications to optimize hemodynamic stability  - Assess quality of pulses, skin color and temperature  - Assess for signs of decreased coronary artery perfusion  - Instruct patient to report change in severity of symptoms  Outcome: Progressing  Goal: Absence of cardiac dysrhythmias or at baseline rhythm  Description: INTERVENTIONS:  - Continuous cardiac monitoring, vital signs, obtain 12 lead EKG if ordered  - Administer antiarrhythmic and heart rate control medications as ordered  - Monitor electrolytes and administer replacement therapy as ordered  Outcome: Progressing     Problem: RESPIRATORY - ADULT  Goal: Achieves optimal ventilation and oxygenation  Description: INTERVENTIONS:  - Assess for changes in respiratory status  - Assess for changes in mentation and behavior  - Position to facilitate oxygenation and minimize respiratory effort  - Oxygen administered by appropriate delivery if ordered  - Initiate smoking cessation education as indicated  - Encourage broncho-pulmonary hygiene including cough, deep breathe, Incentive Spirometry  - Assess the need for suctioning and aspirate as needed  - Assess and instruct to report SOB or any respiratory difficulty  - Respiratory Therapy support as indicated  Outcome: Progressing     Problem: GASTROINTESTINAL - ADULT  Goal: Minimal or absence of nausea and/or vomiting  Description: INTERVENTIONS:  - Administer IV fluids if ordered to ensure adequate hydration  - Maintain NPO status until nausea and vomiting are resolved  - Nasogastric tube if ordered  - Administer ordered antiemetic medications as needed  - Provide nonpharmacologic comfort measures as appropriate  - Advance diet as tolerated, if ordered  - Consider nutrition services referral to assist patient with adequate nutrition and appropriate food choices  Outcome: Progressing  Goal: Maintains or returns to baseline bowel  function  Description: INTERVENTIONS:  - Assess bowel function  - Encourage oral fluids to ensure adequate hydration  - Administer IV fluids if ordered to ensure adequate hydration  - Administer ordered medications as needed  - Encourage mobilization and activity  - Consider nutritional services referral to assist patient with adequate nutrition and appropriate food choices  Outcome: Progressing  Goal: Maintains adequate nutritional intake  Description: INTERVENTIONS:  - Monitor percentage of each meal consumed  - Identify factors contributing to decreased intake, treat as appropriate  - Assist with meals as needed  - Monitor I&O, weight, and lab values if indicated  - Obtain nutrition services referral as needed  Outcome: Progressing  Goal: Establish and maintain optimal ostomy function  Description: INTERVENTIONS:  - Assess bowel function  - Encourage oral fluids to ensure adequate hydration  - Administer IV fluids if ordered to ensure adequate hydration   - Administer ordered medications as needed  - Encourage mobilization and activity  - Nutrition services referral to assist patient with appropriate food choices  - Assess stoma site  - Consider wound care consult   Outcome: Progressing  Goal: Oral mucous membranes remain intact  Description: INTERVENTIONS  - Assess oral mucosa and hygiene practices  - Implement preventative oral hygiene regimen  - Implement oral medicated treatments as ordered  - Initiate Nutrition services referral as needed  Outcome: Progressing     Problem: GENITOURINARY - ADULT  Goal: Maintains or returns to baseline urinary function  Description: INTERVENTIONS:  - Assess urinary function  - Encourage oral fluids to ensure adequate hydration if ordered  - Administer IV fluids as ordered to ensure adequate hydration  - Administer ordered medications as needed  - Offer frequent toileting  - Follow urinary retention protocol if ordered  Outcome: Progressing  Goal: Absence of urinary  retention  Description: INTERVENTIONS:  - Assess patient’s ability to void and empty bladder  - Monitor I/O  - Bladder scan as needed  - Discuss with physician/AP medications to alleviate retention as needed  - Discuss catheterization for long term situations as appropriate  Outcome: Progressing  Goal: Urinary catheter remains patent  Description: INTERVENTIONS:  - Assess patency of urinary catheter  - If patient has a chronic peace, consider changing catheter if non-functioning  - Follow guidelines for intermittent irrigation of non-functioning urinary catheter  Outcome: Progressing     Problem: METABOLIC, FLUID AND ELECTROLYTES - ADULT  Goal: Electrolytes maintained within normal limits  Description: INTERVENTIONS:  - Monitor labs and assess patient for signs and symptoms of electrolyte imbalances  - Administer electrolyte replacement as ordered  - Monitor response to electrolyte replacements, including repeat lab results as appropriate  - Instruct patient on fluid and nutrition as appropriate  Outcome: Progressing  Goal: Fluid balance maintained  Description: INTERVENTIONS:  - Monitor labs   - Monitor I/O and WT  - Instruct patient on fluid and nutrition as appropriate  - Assess for signs & symptoms of volume excess or deficit  Outcome: Progressing  Goal: Glucose maintained within target range  Description: INTERVENTIONS:  - Monitor Blood Glucose as ordered  - Assess for signs and symptoms of hyperglycemia and hypoglycemia  - Administer ordered medications to maintain glucose within target range  - Assess nutritional intake and initiate nutrition service referral as needed  Outcome: Progressing     Problem: SKIN/TISSUE INTEGRITY - ADULT  Goal: Skin Integrity remains intact(Skin Breakdown Prevention)  Description: Assess:  -Perform Carmelo assessment every shift  -Clean and moisturize skin  -Inspect skin when repositioning, toileting, and assisting with ADLS  -Assess extremities for adequate circulation and  sensation     Bed Management:  -Have minimal linens on bed & keep smooth, unwrinkled  -Change linens as needed when moist or perspiring  -Avoid sitting or lying in one position for more than 2 hours while in bed    Toileting:  -Offer bedside commode  -Assess for incontinence    Activity:  -Encourage activity and walks on unit  -Encourage or provide ROM exercises   -Turn and reposition patient every 2 Hours  -Use appropriate equipment to lift or move patient in bed    Skin Care:  -Avoid use of baby powder, tape, friction and shearing, hot water or constrictive clothing  -Relieve pressure over bony prominences  -Do not massage red bony areas    Outcome: Progressing  Goal: Incision(s), wounds(s) or drain site(s) healing without S/S of infection  Description: INTERVENTIONS  - Assess and document dressing, incision, wound bed, drain sites and surrounding tissue  - Provide patient and family education  Outcome: Progressing  Goal: Pressure injury heals and does not worsen  Description: Interventions:  - Implement low air loss mattress or specialty surface (Criteria met)  - Apply silicone foam dressing  - Apply fecal or urinary incontinence containment device   - Turn and reposition patient & offload bony prominences every 2 hours   - Utilize friction reducing device or surface for transfers   - Use incontinent care products after each incontinent episode.  - Consider nutrition services referral as needed  Outcome: Progressing     Problem: HEMATOLOGIC - ADULT  Goal: Maintains hematologic stability  Description: INTERVENTIONS  - Assess for signs and symptoms of bleeding or hemorrhage  - Monitor labs  - Administer supportive blood products/factors as ordered and appropriate  Outcome: Progressing     Problem: MUSCULOSKELETAL - ADULT  Goal: Maintain or return mobility to safest level of function  Description: INTERVENTIONS:  - Assess patient's ability to carry out ADLs; assess patient's baseline for ADL function and identify  physical deficits which impact ability to perform ADLs (bathing, care of mouth/teeth, toileting, grooming, dressing, etc.)  - Assess/evaluate cause of self-care deficits   - Assess range of motion  - Assess patient's mobility  - Assess patient's need for assistive devices and provide as appropriate  - Encourage maximum independence but intervene and supervise when necessary  - Involve family in performance of ADLs  - Assess for home care needs following discharge   - Consider OT consult to assist with ADL evaluation and planning for discharge  - Provide patient education as appropriate  Outcome: Progressing  Goal: Maintain proper alignment of affected body part  Description: INTERVENTIONS:  - Support, maintain and protect limb and body alignment  - Provide patient/ family with appropriate education  Outcome: Progressing     Problem: Nutrition/Hydration-ADULT  Goal: Nutrient/Hydration intake appropriate for improving, restoring or maintaining nutritional needs  Description: Monitor and assess patient's nutrition/hydration status for malnutrition. Collaborate with interdisciplinary team and initiate plan and interventions as ordered.  Monitor patient's weight and dietary intake as ordered or per policy. Utilize nutrition screening tool and intervene as necessary. Determine patient's food preferences and provide high-protein, high-caloric foods as appropriate.     INTERVENTIONS:  - Monitor oral intake, urinary output, labs, and treatment plans  - Assess nutrition and hydration status and recommend course of action  - Evaluate amount of meals eaten  - Assist patient with eating if necessary   - Allow adequate time for meals  - Recommend/ encourage appropriate diets, oral nutritional supplements, and vitamin/mineral supplements  - Order, calculate, and assess calorie counts as needed  - Recommend, monitor, and adjust tube feedings and TPN/PPN based on assessed needs  - Assess need for intravenous fluids  - Provide  specific nutrition/hydration education as appropriate  - Include patient/family/caregiver in decisions related to nutrition  Outcome: Progressing     Problem: Prexisting or High Potential for Compromised Skin Integrity  Goal: Skin integrity is maintained or improved  Description: INTERVENTIONS:  - Identify patients at risk for skin breakdown  - Assess and monitor skin integrity  - Assess and monitor nutrition and hydration status  - Monitor labs   - Assess for incontinence   - Turn and reposition patient  - Assist with mobility/ambulation  - Relieve pressure over bony prominences  - Avoid friction and shearing  - Provide appropriate hygiene as needed including keeping skin clean and dry  - Evaluate need for skin moisturizer/barrier cream  - Collaborate with interdisciplinary team   - Patient/family teaching  - Consider wound care consult   Outcome: Progressing

## 2024-12-07 NOTE — ASSESSMENT & PLAN NOTE
Noted on admission, likely due to metabolic stress from seizure, shock/hemodynamic changes. Rate controlled <110 without BB, but has been borderline w rates in 100s more recently.    Recent Labs     12/05/24  0618 12/06/24  0626 12/07/24  0843   INR 1.24* 1.60* 3.98*     S/p 5 mg, 10 mg, 10 mg of warfarin single doses    Plan:  Continue 25 mg BID propanolol (given hyperthyroid labs)  Hold warfarin for 12/7 evening  Daily INR  Start warfarin bridge 12/4 onwards. Will need at least 3 days on drip. Target 2.0-3.0  Known cost issues w DOACs per girlfriend, so this is not an option  Anticipate d/c to home 12/8 if INR improves to lower range

## 2024-12-07 NOTE — ASSESSMENT & PLAN NOTE
Lab Results   Component Value Date    EGFR 51 12/07/2024    EGFR 47 12/06/2024    EGFR 46 12/05/2024    CREATININE 1.12 12/07/2024    CREATININE 1.20 12/06/2024    CREATININE 1.23 12/05/2024     No RADHA  Avoid nephrotoxins

## 2024-12-07 NOTE — PROGRESS NOTES
Progress Note - Hospitalist   Name: Adelina Soto 64 y.o. female I MRN: [id number]  [facility] I Date of Admission: 11/12/2024   Date of Service: 12/7/2024 I Hospital Day: 25    Assessment & Plan  Seizure (HCC)  Noted on admission, tonic clonic activity by significant other s/p 6 mg IV versed load.  LP unremarkable. ME panel and HSV negative but treated w acyclovir [id number].   CTH, CTA, and MRI unremarkable. PoA wishes for full treatment medically but was agreeable to DNR, yes to intubate.     Plan:  Continue 750 mg BID keppra  Continue valproate 500 mg q6h    PT OT: recommending postacute rehab. She is a 2-3 person max assist. Spoke at length w patient's caregiver and girlfriend on 12/4 and patient and girlfriend are both in agreement to go home w home PT OT/HH. Patient refuses rehab and caregiver is willing to be there around the clock to provide support. D/c is pending other medical issues - mainly warfarin bridging     Dyslipidemia  Continue atorvastatin 10 mg daily  Paroxysmal atrial fibrillation (HCC)  Noted on admission, likely due to metabolic stress from seizure, shock/hemodynamic changes. Rate controlled <110 without BB, but has been borderline w rates in 100s more recently.    Recent Labs     12/05/24  0618 12/06/24  0626 12/07/24  0843   INR 1.24* 1.60* 3.98*     S/p 5 mg, 10 mg, 10 mg of warfarin single doses    Plan:  Continue 25 mg BID propanolol (given hyperthyroid labs)  Hold warfarin for 12/7 evening  Daily INR  Start warfarin bridge 12/4 onwards. Will need at least 3 days on drip. Target 2.0-3.0  Known cost issues w DOACs per girlfriend, so this is not an option  Anticipate d/c to home 12/8 if INR improves to lower range    COPD with asthma (HCC)  Continue xopenex/atrovent   Outpatient PFTs  O2 eval closer to d/c; currently on 2L n/c. Girlfriend states pt has 2-3 L N/C on baseline w home O2 concentrator  Hypothyroid  At home on levothyroxine 100 mcg po qd  Lab Results   Component  Value Date    [id number] 0.295 (L) 11/14/2024    FREET4 1.23 (H) 11/14/2024   Labs show slightly hyperthyroid - may be contributing to afib.    Plan:  Will slowly dose decrease to 88 mcg po qd, follow up outpt labs 4-6 weeks    GERD (gastroesophageal reflux disease)  Continue PPI  Anxiety  Continue zoloft 50 mg daily  Morbid obesity with BMI of 50.0-59.9, adult (HCC)  Complicates all facets of care  Contributing to poor functional status  Should be started on GLP1-ag outpatient - defer to PCP  Depression, recurrent (HCC)  Continue home zoloft  CKD (chronic kidney disease)  Lab Results   Component Value Date    EGFR 51 12/07/2024    EGFR 47 12/06/2024    EGFR 46 12/05/2024    CREATININE 1.12 12/07/2024    CREATININE 1.20 12/06/2024    CREATININE 1.23 12/05/2024     No RADHA  Avoid nephrotoxins  Type 2 diabetes mellitus with hyperglycemia, unspecified whether long term insulin use (HCC)  Lab Results   Component Value Date    HGBA1C 5.7 (H) 11/13/2024     Fasting BG within goal range, no SSI needed    Blood Sugar Average: Last 72 hrs:    Remains well controlled while inpatient  On dental soft/dysphagia 3 diet w/o carb modifier - continue  Bacteriuria  Completed course of zosyn x 3d to cover for urinary infectious source with shock, AMS on presentation   Noted pyruia on admission w (+) Ucx for ESBL Proteus, and E. Coli  No further abx indicated  Acute on chronic hypoxic respiratory failure (HCC)  On 3 L NC at home per girlfriend    Negative PNA workup: CXR, bronch (resp viktoriya mixed) on 11/13. Monitor off abx  Was initially intubated for airway protection from seizure.  Now on 4 L N/C, suspect component of untreated/undiagnosed SASHA due to body habitus, plus COPD as documented elsewhere    TTE 11/14 showed LVEF 60%, moderate concentric hypertrophy, poor windows/view of RV so difficult to assess RVSP.     Plan:  Outpatient sleep study  Home O2 eval closer to discharge  Monitor off abx  Outpatient pulm f/u for formal  PFTs  Resp protocol    Bacteremia due to Staphylococcus  1/2 Bcx (+) staph epi, likely contaminant - monitor off abx  Thigh hematoma, left, initial encounter  Noted 12/2. Started on IV vanc empirically 12/2 for infection. CT consistent w hematoma  Vanc d/c'd on 12/3 by ID.  ID has signed off    Plan:  Monitor off abx for now  Monitor WBC, temps    VTE Pharmacologic Prophylaxis: VTE Score: 6 High Risk (Score >/= 5) - Pharmacological DVT Prophylaxis Ordered: heparin drip. Sequential Compression Devices Ordered.    Mobility:   Basic Mobility Inpatient Raw Score: 8  -HL Goal: 3: Sit at edge of bed  JH-HLM Achieved: 2: Bed activities/Dependent transfer  JH-HLM Goal NOT achieved. Continue with multidisciplinary rounding and encourage appropriate mobility to improve upon -HL goals.    Patient Centered Rounds: I performed bedside rounds with nursing staff today.   Discussions with Specialists or Other Care Team Provider: n/a    Education and Discussions with Family / Patient: Updated  (girlfriend) at bedside.    Current Length of Stay: 25 day(s)  Current Patient Status: Inpatient   Certification Statement: The patient will continue to require additional inpatient hospital stay due to need for warfarin to coumadin bridge x 3+ days goal INR 2.0-3.0   Discharge Plan: Anticipate discharge in 24-48 hrs to home with home services.    Code Status: Level 2 - DNAR: but accepts endotracheal intubation    Subjective   Patient has no complaints today. Girlfriend at bedside reports no issues.   Denies fevers/chills, chest pain, shortness of breath, heart palpitations, nausea, vomiting, abdominal pain.        Objective :  Temp:  [97.4 °F (36.3 °C)-97.6 °F (36.4 °C)] 97.4 °F (36.3 °C)  HR:  [66-90] 90  BP: ()/(51-67) 117/67  Resp:  [16] 16  SpO2:  [91 %-99 %] 96 %  O2 Device: Nasal cannula  Nasal Cannula O2 Flow Rate (L/min):  [3 L/min] 3 L/min    Body mass index is 45.98 kg/m².     Input and Output Summary (last  24 hours):     Intake/Output Summary (Last 24 hours) at 12/7/2024 1813  Last data filed at 12/7/2024 0945  Gross per 24 hour   Intake 1532.51 ml   Output 225 ml   Net 1307.51 ml         Physical Exam  Vitals reviewed.   Constitutional:       General: She is not in acute distress.     Appearance: She is ill-appearing. She is not diaphoretic.   HENT:      Head: Normocephalic and atraumatic.      Mouth/Throat:      Mouth: Mucous membranes are moist.      Pharynx: Oropharynx is clear.   Eyes:      General: No scleral icterus.  Cardiovascular:      Rate and Rhythm: Normal rate and regular rhythm.      Heart sounds: No murmur heard.     No friction rub. No gallop.   Pulmonary:      Effort: Pulmonary effort is normal. No respiratory distress.      Comments: Diminished BS b/l  Abdominal:      General: There is no distension.      Palpations: Abdomen is soft.      Tenderness: There is no abdominal tenderness. There is no guarding.   Musculoskeletal:      Right lower leg: Edema (trace) present.      Left lower leg: Edema (trace) present.      Comments:   Moving all 4 extremities    Skin:     General: Skin is warm and dry.   Psychiatric:         Mood and Affect: Mood normal.         Lines/Drains: none                Lab Results: I have reviewed the following results:   Results from last 7 days   Lab Units 12/07/24  0647 12/06/24  0626 12/05/24  0618 12/02/24  0555 12/01/24  0541   WBC Thousand/uL 5.11   < > 5.42   < > 5.81   HEMOGLOBIN g/dL 10.4*   < > 9.7*   < > 8.1*   HEMATOCRIT % 36.3   < > 33.4*   < > 28.4*   PLATELETS Thousands/uL 294   < > 359   < > 394*   BANDS PCT %  --   --  1   < >  --    SEGS PCT %  --   --   --   --  67   LYMPHO PCT %  --   --  26   < > 21   MONO PCT %  --   --  9   < > 10   EOS PCT %  --   --  2   < > 0    < > = values in this interval not displayed.     Results from last 7 days   Lab Units 12/07/24  0647 12/05/24  0618 12/04/24  0902   SODIUM mmol/L 142   < > 143   POTASSIUM mmol/L 5.0   < > 4.1    CHLORIDE mmol/L 101   < > 106   CO2 mmol/L 35*   < > 33*   BUN mg/dL 13   < > 21   CREATININE mg/dL 1.12   < > 1.18   ANION GAP mmol/L 6   < > 4   CALCIUM mg/dL 9.6   < > 8.7   ALBUMIN g/dL  --   --  2.8*   TOTAL BILIRUBIN mg/dL  --   --  0.30   ALK PHOS U/L  --   --  58   ALT U/L  --   --  8   AST U/L  --   --  12*   GLUCOSE RANDOM mg/dL 90   < > 88    < > = values in this interval not displayed.     Results from last 7 days   Lab Units 12/07/24  0843   INR  3.98*     Results from last 7 days   Lab Units 12/02/24  1139 12/02/24  0114 12/01/24  1200 12/01/24  0732 11/30/24  2352   POC GLUCOSE mg/dl 91 84 98 87 103                 Recent Cultures (last 7 days):   Results from last 7 days   Lab Units 12/02/24  1057   BLOOD CULTURE  No Growth After 5 Days.  No Growth After 5 Days.       Imaging Results Review: I reviewed radiology reports from this admission including: CT head.  Other Study Results Review: EKG was reviewed.     Last 24 Hours Medication List:     Current Facility-Administered Medications:     acetaminophen (TYLENOL) oral suspension 650 mg, Q4H PRN    albuterol inhalation solution 2.5 mg, Q6H PRN    atorvastatin (LIPITOR) tablet 10 mg, Daily With Dinner    guaiFENesin (ROBITUSSIN) oral liquid 200 mg, Q4H PRN    heparin (porcine) 25,000 units in 0.45% NaCl 250 mL infusion (premix), Titrated, Last Rate: 6 Units/kg/hr (12/07/24 1538)    heparin (porcine) injection 10,000 Units, Q6H PRN    heparin (porcine) injection 5,000 Units, Q6H PRN    levalbuterol (XOPENEX) inhalation solution 1.25 mg, TID    levETIRAcetam (KEPPRA) tablet 750 mg, Q12H LINO    levothyroxine tablet 88 mcg, Early Morning    lidocaine (LIDODERM) 5 % patch 2 patch, Daily    moisture barrier miconazole 2% cream (aka EARLINE MOISTURE BARRIER ANTIFUNGAL CREAM), BID    ondansetron (ZOFRAN) injection 4 mg, Q4H PRN    pantoprazole (PROTONIX) injection 40 mg, Q12H LINO    propranolol (INDERAL) oral solution 20 mg, Q12H LINO    sertraline (ZOLOFT)  tablet 50 mg, Daily    sodium chloride 3 % inhalation solution 4 mL, TID    thiamine tablet 100 mg, Daily    valproic acid (DEPAKENE) oral soln 500 mg, Q6H LINO    Administrative Statements   Today, Patient Was Seen By: Thuy Son MD  I have spent a total time of 40 minutes in caring for this patient on the day of the visit/encounter including Impressions, Counseling / Coordination of care, Documenting in the medical record, Reviewing / ordering tests, medicine, procedures  , Obtaining or reviewing history  , and Communicating with other healthcare professionals .    **Please Note: This note may have been constructed using a voice recognition system.**

## 2024-12-08 LAB
APTT PPP: 148 SECONDS (ref 23–34)
INR PPP: 5.33 (ref 0.85–1.19)
PROTHROMBIN TIME: 47.5 SECONDS (ref 12.3–15)

## 2024-12-08 PROCEDURE — 99232 SBSQ HOSP IP/OBS MODERATE 35: CPT

## 2024-12-08 PROCEDURE — 94640 AIRWAY INHALATION TREATMENT: CPT

## 2024-12-08 PROCEDURE — 94664 DEMO&/EVAL PT USE INHALER: CPT

## 2024-12-08 PROCEDURE — 94760 N-INVAS EAR/PLS OXIMETRY 1: CPT

## 2024-12-08 PROCEDURE — 85730 THROMBOPLASTIN TIME PARTIAL: CPT

## 2024-12-08 PROCEDURE — 85610 PROTHROMBIN TIME: CPT

## 2024-12-08 PROCEDURE — 97530 THERAPEUTIC ACTIVITIES: CPT

## 2024-12-08 PROCEDURE — 97112 NEUROMUSCULAR REEDUCATION: CPT

## 2024-12-08 RX ADMIN — VALPROIC ACID 500 MG: 500 SOLUTION ORAL at 17:00

## 2024-12-08 RX ADMIN — VALPROIC ACID 500 MG: 500 SOLUTION ORAL at 23:28

## 2024-12-08 RX ADMIN — LEVETIRACETAM 750 MG: 750 TABLET, FILM COATED ORAL at 20:51

## 2024-12-08 RX ADMIN — SODIUM CHLORIDE SOLN NEBU 3% 4 ML: 3 NEBU SOLN at 13:56

## 2024-12-08 RX ADMIN — MICONAZOLE NITRATE 1 APPLICATION: 20 CREAM TOPICAL at 08:08

## 2024-12-08 RX ADMIN — PROPRANOLOL HYDROCHLORIDE 20 MG: 20 SOLUTION ORAL at 20:51

## 2024-12-08 RX ADMIN — PROPRANOLOL HYDROCHLORIDE 20 MG: 20 SOLUTION ORAL at 08:08

## 2024-12-08 RX ADMIN — THIAMINE HCL TAB 100 MG 100 MG: 100 TAB at 08:08

## 2024-12-08 RX ADMIN — GUAIFENESIN 200 MG: 200 SOLUTION ORAL at 05:13

## 2024-12-08 RX ADMIN — ATORVASTATIN CALCIUM 10 MG: 10 TABLET, FILM COATED ORAL at 16:58

## 2024-12-08 RX ADMIN — SODIUM CHLORIDE SOLN NEBU 3% 4 ML: 3 NEBU SOLN at 06:56

## 2024-12-08 RX ADMIN — VALPROIC ACID 500 MG: 500 SOLUTION ORAL at 05:21

## 2024-12-08 RX ADMIN — LEVETIRACETAM 750 MG: 750 TABLET, FILM COATED ORAL at 08:08

## 2024-12-08 RX ADMIN — VALPROIC ACID 500 MG: 500 SOLUTION ORAL at 12:04

## 2024-12-08 RX ADMIN — MICONAZOLE NITRATE 1 APPLICATION: 20 CREAM TOPICAL at 17:01

## 2024-12-08 RX ADMIN — LEVOTHYROXINE SODIUM 88 MCG: 88 TABLET ORAL at 05:13

## 2024-12-08 RX ADMIN — LEVALBUTEROL HYDROCHLORIDE 1.25 MG: 1.25 SOLUTION RESPIRATORY (INHALATION) at 06:56

## 2024-12-08 RX ADMIN — LEVALBUTEROL HYDROCHLORIDE 1.25 MG: 1.25 SOLUTION RESPIRATORY (INHALATION) at 13:56

## 2024-12-08 RX ADMIN — PANTOPRAZOLE SODIUM 40 MG: 40 INJECTION, POWDER, FOR SOLUTION INTRAVENOUS at 20:51

## 2024-12-08 RX ADMIN — PANTOPRAZOLE SODIUM 40 MG: 40 INJECTION, POWDER, FOR SOLUTION INTRAVENOUS at 08:08

## 2024-12-08 RX ADMIN — ALBUTEROL SULFATE 2.5 MG: 2.5 SOLUTION RESPIRATORY (INHALATION) at 05:15

## 2024-12-08 RX ADMIN — SERTRALINE HYDROCHLORIDE 50 MG: 50 TABLET ORAL at 08:08

## 2024-12-08 NOTE — ASSESSMENT & PLAN NOTE
At home on levothyroxine 100 mcg po qd  Lab Results   Component Value Date    [id number] 0.295 (L) 11/14/2024    FREET4 1.23 (H) 11/14/2024   Labs show slightly hyperthyroid - may be contributing to afib.    Plan:  Will slowly dose decrease to 88 mcg po qd, follow up outpt labs 4-6 weeks

## 2024-12-08 NOTE — RESTORATIVE TECHNICIAN NOTE
Restorative Technician Note      Patient Name: Adelina Soto     Note Type: Mobility  Patient Position Upon Consult: Supine  Activity Performed: Transferred; Dangled; Stood  Assistive Device: Other (Comment) (Assist x2 to sit EOB/stand-pivot to the chair. Assisted PTA Clark. Notified RN Malou to laterally slide pt back to bed from the drop-arm recliner chair.)  Education Provided: Yes  Patient Position at End of Consult: Bedside chair; All needs within reach; Bed/Chair alarm activated    Taurus HENDERSON, Restorative Technician,

## 2024-12-08 NOTE — PHYSICAL THERAPY NOTE
Physical Therapy Progress Note     12/08/24 1315   PT Last Visit   PT Visit Date 12/08/24   Note Type   Note Type Treatment   Pain Assessment   Pain Assessment Tool 0-10   Pain Score No Pain   Restrictions/Precautions   Other Precautions Chair Alarm;Bed Alarm;Fall Risk;O2  (Alarm active post session.)   Subjective   Subjective The patient notes that she would like to sit up in a chair. She did express a fear of falling.   Bed Mobility   Supine to Sit 2  Maximal assistance   Additional items Assist x 2;Increased time required;Verbal cues;LE management   Transfers   Sit to Stand 2  Maximal assistance   Additional items Assist x 2;Increased time required;Verbal cues  (Bilateral knee block and modified hammock technique.)   Stand to Sit 2  Maximal assistance   Additional items Assist x 2;Increased time required;Verbal cues   Stand pivot 2  Maximal assistance   Additional items Assist x 2;Increased time required;Verbal cues  (Bilateral knee block and modified hammock technique.)   Balance   Static Sitting Fair -   Dynamic Sitting Poor +   Static Standing Poor -   Dynamic Standing Poor -   Activity Tolerance   Activity Tolerance Patient tolerated treatment well;Patient limited by fatigue   Medical Staff Made Aware Dr. Huy MD.   Nurse Made Aware LEONIE Westfall.   Assessment   Prognosis Good   Problem List Decreased strength;Decreased range of motion;Decreased endurance;Impaired balance;Decreased mobility;Decreased coordination   Assessment The patient is eager to get back home, but she would like to get out to the chair today. She expressed a fear of falling, but with some education and demonstration of how her safety will be maintained she managed well during the transfer. An arm-in-arm technique was utilized so that a bilateral knee block and a modified hammock technique could be utilized to fully support her. She required manual facilitation of rotation of her hips in order to transition to the chair, but she was able  to take three small steps with her RLE. The patient was positioned in the chair fully while resting on an EHOB cushion and a chair alarm. An oxygen extension as well as humidification setup was provided as well. The recliner chair was placed on the right side of the bed so that she can more easily be returned to bed via the Smooth . She will benefit from continued therapies in order to maximize her functional mobility.   Barriers to Discharge Inaccessible home environment;Decreased caregiver support   Goals   Patient Goals To go home.   STG Expiration Date 12/16/24   PT Treatment Day 1   Plan   Treatment/Interventions Functional transfer training;LE strengthening/ROM;Therapeutic exercise;Endurance training;Patient/family training;Bed mobility   Progress Slow progress, decreased activity tolerance   PT Frequency 2-3x/wk   Discharge Recommendation   Rehab Resource Intensity Level, PT II (Moderate Resource Intensity)   AM-PAC Basic Mobility Inpatient   Turning in Flat Bed Without Bedrails 2   Lying on Back to Sitting on Edge of Flat Bed Without Bedrails 1   Moving Bed to Chair 1   Standing Up From Chair Using Arms 1   Walk in Room 1   Climb 3-5 Stairs With Railing 1   Basic Mobility Inpatient Raw Score 7   Turning Head Towards Sound 4   Follow Simple Instructions 4   Low Function Basic Mobility Raw Score  15   Low Function Basic Mobility Standardized Score  23.9   [facility] Highest Level Of Mobility   -HLM Goal 2: Bed activities/Dependent transfer   -HLM Achieved 4: Move to chair/commode         An AM-PAC Basic Mobility raw score less than 16 suggests the patient may benefit from discharge to post-acute rehab services.    Clark Escobar, PTA

## 2024-12-08 NOTE — ASSESSMENT & PLAN NOTE
Noted on admission, tonic clonic activity by significant other s/p 6 mg IV versed load.  LP unremarkable. ME panel and HSV negative but treated w acyclovir [id number].   CTH, CTA, and MRI unremarkable. PoA wishes for full treatment medically but was agreeable to DNR, yes to intubate.     Plan:  Continue 750 mg BID keppra  Continue valproate 500 mg q6h    PT OT: recommending postacute rehab. She is a 2-3 person max assist. Spoke at length w patient's caregiver and girlfriend on 12/4 and patient and girlfriend are both in agreement to go home w home PT OT/HH. Patient refuses rehab and caregiver is willing to be there around the clock to provide support. D/c is pending other medical issues - mainly warfarin bridging

## 2024-12-08 NOTE — PLAN OF CARE
Problem: PAIN - ADULT  Goal: Verbalizes/displays adequate comfort level or baseline comfort level  Description: Interventions:  - Encourage patient to monitor pain and request assistance  - Assess pain using appropriate pain scale  - Administer analgesics based on type and severity of pain and evaluate response  - Implement non-pharmacological measures as appropriate and evaluate response  - Notify physician/advanced practitioner if interventions unsuccessful or patient reports new pain  Outcome: Progressing     Problem: INFECTION - ADULT  Goal: Absence or prevention of progression during hospitalization  Description: INTERVENTIONS:  - Assess and monitor for signs and symptoms of infection  - Monitor lab/diagnostic results  - Monitor all insertion sites, i.e. indwelling lines, tubes, and drains  - [place] appropriate cooling/warming therapies per order  - Administer medications as ordered  - Instruct and encourage patient and family to use good hand hygiene technique  - Identify and instruct in appropriate isolation precautions for identified infection/condition  Outcome: Progressing     Problem: SAFETY ADULT  Goal: Patient will remain free of falls  Description: INTERVENTIONS:  - Educate patient/family on patient safety including physical limitations  - Instruct patient to call for assistance with activity   - Consult OT/PT to assist with strengthening/mobility   - Keep Call bell within reach  - Keep bed low and locked with side rails adjusted as appropriate  - Keep care items and personal belongings within reach  - Initiate and maintain comfort rounds  - Make Fall Risk Sign visible to staff  - Offer Toileting every 2 Hours, in advance of need  - Initiate/Maintain bed alarm  - Apply yellow socks and bracelet for high fall risk patients  - Consider moving patient to room near nurses station  Outcome: Progressing  Goal: Maintain or return to baseline ADL function  Description: INTERVENTIONS:  -  Assess patient's  ability to carry out ADLs; assess patient's baseline for ADL function and identify physical deficits which impact ability to perform ADLs (bathing, care of mouth/teeth, toileting, grooming, dressing, etc.)  - Assess/evaluate cause of self-care deficits   - Assess range of motion  - Assess patient's mobility; develop plan if impaired  - Assess patient's need for assistive devices and provide as appropriate  - Encourage maximum independence but intervene and supervise when necessary  - Involve family in performance of ADLs  - Assess for home care needs following discharge   - Consider OT consult to assist with ADL evaluation and planning for discharge  - Provide patient education as appropriate  Outcome: Progressing  Goal: Maintains/Returns to pre admission functional level  Description: INTERVENTIONS:  - Perform AM-PAC 6 Click Basic Mobility/ Daily Activity assessment daily.  - Set and communicate daily mobility goal to care team and patient/family/caregiver.   - Collaborate with rehabilitation services on mobility goals if consulted  - Perform Range of Motion 3 times a day.  - Reposition patient every 2 hours.  - Dangle patient 3 times a day  - Stand patient 2 times a day  - Ambulate patient 3 times a day  - Out of bed to chair 2 times a day   - Out of bed for meals 3 times a day  - Out of bed for toileting  - Record patient progress and toleration of activity level   Outcome: Progressing     Problem: DISCHARGE PLANNING  Goal: Discharge to home or other facility with appropriate resources  Description: INTERVENTIONS:  - Identify barriers to discharge w/patient and caregiver  - Arrange for needed discharge resources and transportation as appropriate  - Identify discharge learning needs (meds, wound care, etc.)  - Refer to Case Management Department for coordinating discharge planning if the patient needs post-hospital services based on physician/advanced practitioner order or complex needs related to functional  status, cognitive ability, or social support system  Outcome: Progressing     Problem: Knowledge Deficit  Goal: Patient/family/caregiver demonstrates understanding of disease process, treatment plan, medications, and discharge instructions  Description: Complete learning assessment and assess knowledge base.  Interventions:  - Provide teaching at level of understanding  - Provide teaching via preferred learning methods  Outcome: Progressing     Problem: NEUROSENSORY - ADULT  Goal: Achieves stable or improved neurological status  Description: INTERVENTIONS  - Monitor and report changes in neurological status  - Monitor vital signs such as temperature, blood pressure, glucose, and any other labs ordered   - Initiate measures to prevent increased intracranial pressure  - Monitor for seizure activity and implement precautions if appropriate      Outcome: Progressing  Goal: Remains free of injury related to seizures activity  Description: INTERVENTIONS  - Maintain airway, patient safety  and administer oxygen as ordered  - Monitor patient for seizure activity, document and report duration and description of seizure to physician/advanced practitioner  - If seizure occurs,  ensure patient safety during seizure  - Reorient patient post seizure  - Seizure pads on all 4 side rails  - Instruct patient/family to notify RN of any seizure activity including if an aura is experienced  - Instruct patient/family to call for assistance with activity based on nursing assessment  - Administer anti-seizure medications if ordered    Outcome: Progressing  Goal: Achieves maximal functionality and self care  Description: INTERVENTIONS  - Monitor swallowing and airway patency with patient fatigue and changes in neurological status  - Encourage and assist patient to increase activity and self care.   - Encourage visually impaired, hearing impaired and aphasic patients to use assistive/communication devices  Outcome: Progressing     Problem:  CARDIOVASCULAR - ADULT  Goal: Maintains optimal cardiac output and hemodynamic stability  Description: INTERVENTIONS:  - Monitor I/O, vital signs and rhythm  - Monitor for S/S and trends of decreased cardiac output  - Administer and titrate ordered vasoactive medications to optimize hemodynamic stability  - Assess quality of pulses, skin color and temperature  - Assess for signs of decreased coronary artery perfusion  - Instruct patient to report change in severity of symptoms  Outcome: Progressing  Goal: Absence of cardiac dysrhythmias or at baseline rhythm  Description: INTERVENTIONS:  - Continuous cardiac monitoring, vital signs, obtain 12 lead EKG if ordered  - Administer antiarrhythmic and heart rate control medications as ordered  - Monitor electrolytes and administer replacement therapy as ordered  Outcome: Progressing     Problem: RESPIRATORY - ADULT  Goal: Achieves optimal ventilation and oxygenation  Description: INTERVENTIONS:  - Assess for changes in respiratory status  - Assess for changes in mentation and behavior  - Position to facilitate oxygenation and minimize respiratory effort  - Oxygen administered by appropriate delivery if ordered  - Initiate smoking cessation education as indicated  - Encourage broncho-pulmonary hygiene including cough, deep breathe, Incentive Spirometry  - Assess the need for suctioning and aspirate as needed  - Assess and instruct to report SOB or any respiratory difficulty  - Respiratory Therapy support as indicated  Outcome: Progressing     Problem: GASTROINTESTINAL - ADULT  Goal: Maintains or returns to baseline bowel function  Description: INTERVENTIONS:  - Assess bowel function  - Encourage oral fluids to ensure adequate hydration  - Administer IV fluids if ordered to ensure adequate hydration  - Administer ordered medications as needed  - Encourage mobilization and activity  - Consider nutritional services referral to assist patient with adequate nutrition and  appropriate food choices  Outcome: Progressing  Goal: Maintains adequate nutritional intake  Description: INTERVENTIONS:  - Monitor percentage of each meal consumed  - Identify factors contributing to decreased intake, treat as appropriate  - Assist with meals as needed  - Monitor I&O, weight, and lab values if indicated  - Obtain nutrition services referral as needed  Outcome: Progressing     Problem: GENITOURINARY - ADULT  Goal: Maintains or returns to baseline urinary function  Description: INTERVENTIONS:  - Assess urinary function  - Encourage oral fluids to ensure adequate hydration if ordered  - Administer IV fluids as ordered to ensure adequate hydration  - Administer ordered medications as needed  - Offer frequent toileting  - Follow urinary retention protocol if ordered  Outcome: Progressing     Problem: METABOLIC, FLUID AND ELECTROLYTES - ADULT  Goal: Electrolytes maintained within normal limits  Description: INTERVENTIONS:  - Monitor labs and assess patient for signs and symptoms of electrolyte imbalances  - Administer electrolyte replacement as ordered  - Monitor response to electrolyte replacements, including repeat lab results as appropriate  - Instruct patient on fluid and nutrition as appropriate  Outcome: Progressing  Goal: Fluid balance maintained  Description: INTERVENTIONS:  - Monitor labs   - Monitor I/O and WT  - Instruct patient on fluid and nutrition as appropriate  - Assess for signs & symptoms of volume excess or deficit  Outcome: Progressing  Goal: Glucose maintained within target range  Description: INTERVENTIONS:  - Monitor Blood Glucose as ordered  - Assess for signs and symptoms of hyperglycemia and hypoglycemia  - Administer ordered medications to maintain glucose within target range  - Assess nutritional intake and initiate nutrition service referral as needed  Outcome: Progressing     Problem: SKIN/TISSUE INTEGRITY - ADULT  Goal: Skin Integrity remains intact(Skin Breakdown  Prevention)  Description: Assess:  -Perform [place] assessment every shift  -Clean and moisturize skin  -Inspect skin when repositioning, toileting, and assisting with ADLS  -Assess extremities for adequate circulation and sensation     Bed Management:  -Have minimal linens on bed & keep smooth, unwrinkled  -Change linens as needed when moist or perspiring  -Avoid sitting or lying in one position for more than 2 hours while in bed    Toileting:  -Offer bedside commode  -Assess for incontinence    Activity:  -Encourage activity and walks on unit  -Encourage or provide ROM exercises   -Turn and reposition patient every 2 Hours  -Use appropriate equipment to lift or move patient in bed    Skin Care:  -Avoid use of baby powder, tape, friction and shearing, hot water or constrictive clothing  -Relieve pressure over bony prominences  -Do not massage red bony areas    Outcome: Progressing  Goal: Incision(s), wounds(s) or drain site(s) healing without S/S of infection  Description: INTERVENTIONS  - Assess and document dressing, incision, wound bed, drain sites and surrounding tissue  - Provide patient and family education  Outcome: Progressing  Goal: Pressure injury heals and does not worsen  Description: Interventions:  - Implement low air loss mattress or specialty surface (Criteria met)  - Apply silicone foam dressing  - Apply fecal or urinary incontinence containment device   - Turn and reposition patient & offload bony prominences every 2 hours   - Utilize friction reducing device or surface for transfers   - Use incontinent care products after each incontinent episode.  - Consider nutrition services referral as needed  Outcome: Progressing     Problem: HEMATOLOGIC - ADULT  Goal: Maintains hematologic stability  Description: INTERVENTIONS  - Assess for signs and symptoms of bleeding or hemorrhage  - Monitor labs  - Administer supportive blood products/factors as ordered and appropriate  Outcome: Progressing     Problem:  MUSCULOSKELETAL - ADULT  Goal: Maintain or return mobility to safest level of function  Description: INTERVENTIONS:  - Assess patient's ability to carry out ADLs; assess patient's baseline for ADL function and identify physical deficits which impact ability to perform ADLs (bathing, care of mouth/teeth, toileting, grooming, dressing, etc.)  - Assess/evaluate cause of self-care deficits   - Assess range of motion  - Assess patient's mobility  - Assess patient's need for assistive devices and provide as appropriate  - Encourage maximum independence but intervene and supervise when necessary  - Involve family in performance of ADLs  - Assess for home care needs following discharge   - Consider OT consult to assist with ADL evaluation and planning for discharge  - Provide patient education as appropriate  Outcome: Progressing  Goal: Maintain proper alignment of affected body part  Description: INTERVENTIONS:  - Support, maintain and protect limb and body alignment  - Provide patient/ family with appropriate education  Outcome: Progressing     Problem: Nutrition/Hydration-ADULT  Goal: Nutrient/Hydration intake appropriate for improving, restoring or maintaining nutritional needs  Description: Monitor and assess patient's nutrition/hydration status for malnutrition. Collaborate with interdisciplinary team and initiate plan and interventions as ordered.  Monitor patient's weight and dietary intake as ordered or per policy. Utilize nutrition screening tool and intervene as necessary. Determine patient's food preferences and provide high-protein, high-caloric foods as appropriate.     INTERVENTIONS:  - Monitor oral intake, urinary output, labs, and treatment plans  - Assess nutrition and hydration status and recommend course of action  - Evaluate amount of meals eaten  - Assist patient with eating if necessary   - Allow adequate time for meals  - Recommend/ encourage appropriate diets, oral nutritional supplements, and  vitamin/mineral supplements  - Order, calculate, and assess calorie counts as needed  - Assess need for intravenous fluids  - Provide specific nutrition/hydration education as appropriate  - Include patient/family/caregiver in decisions related to nutrition  Outcome: Progressing     Problem: Prexisting or High Potential for Compromised Skin Integrity  Goal: Skin integrity is maintained or improved  Description: INTERVENTIONS:  - Identify patients at risk for skin breakdown  - Assess and monitor skin integrity  - Assess and monitor nutrition and hydration status  - Monitor labs   - Assess for incontinence   - Turn and reposition patient  - Assist with mobility/ambulation  - Relieve pressure over bony prominences  - Avoid friction and shearing  - Provide appropriate hygiene as needed including keeping skin clean and dry  - Evaluate need for skin moisturizer/barrier cream  - Collaborate with interdisciplinary team   - Patient/family teaching  - Consider wound care consult   Outcome: Progressing

## 2024-12-08 NOTE — PLAN OF CARE
Problem: PHYSICAL THERAPY ADULT  Goal: Performs mobility at highest level of function for planned discharge setting.  See evaluation for individualized goals.  Description: Treatment/Interventions: Functional transfer training, Endurance training, Bed mobility, Spoke to nursing, OT          See flowsheet documentation for full assessment, interventions and recommendations.  Outcome: Progressing  Note: Prognosis: Good  Problem List: Decreased strength, Decreased range of motion, Decreased endurance, Impaired balance, Decreased mobility, Decreased coordination  Assessment: The patient is eager to get back home, but she would like to get out to the chair today. She expressed a fear of falling, but with some education and demonstration of how her safety will be maintained she managed well during the transfer. An arm-in-arm technique was utilized so that a bilateral knee block and a modified hammock technique could be utilized to fully support her. She required manual facilitation of rotation of her hips in order to transition to the chair, but she was able to take three small steps with her RLE. The patient was positioned in the chair fully while resting on an EHOB cushion and a chair alarm. An oxygen extension as well as humidification setup was provided as well. The recliner chair was placed on the right side of the bed so that she can more easily be returned to bed via the Smooth . She will benefit from continued therapies in order to maximize her functional mobility.  Barriers to Discharge: Inaccessible home environment, Decreased caregiver support     Rehab Resource Intensity Level, PT: II (Moderate Resource Intensity)    See flowsheet documentation for full assessment.

## 2024-12-08 NOTE — ASSESSMENT & PLAN NOTE
Noted on admission, likely due to metabolic stress from seizure, shock/hemodynamic changes. Rate controlled <110 without BB, but has been borderline w rates in 100s more recently.    Recent Labs     12/06/24  0626 12/07/24  0843 12/08/24  0605   INR 1.60* 3.98* 5.33*     Known cost issues w DOACs per girlfriend, so this is not an option  Started warfarin bridge 12/4 onwards. Need at least 3 days on drip. Target 2.0-3.0  S/p 5 mg, 10 mg, 10 mg of warfarin single doses  Held warfarin on 12/7 and 12/8  OFF of heparin drip    Plan:  Continue 25 mg BID propanolol (given hyperthyroid labs)  Hold warfarin for 12/8 evening  Daily INR  Anticipate d/c to home once INR begins downtrending

## 2024-12-08 NOTE — PROGRESS NOTES
Progress Note - Hospitalist   Name: Adelina Soto 64 y.o. female I MRN: [id number]  [facility] I Date of Admission: 11/12/2024   Date of Service: 12/8/2024 I Hospital Day: 26    Assessment & Plan  Seizure (HCC)  Noted on admission, tonic clonic activity by significant other s/p 6 mg IV versed load.  LP unremarkable. ME panel and HSV negative but treated w acyclovir [id number].   CTH, CTA, and MRI unremarkable. PoA wishes for full treatment medically but was agreeable to DNR, yes to intubate.     Plan:  Continue 750 mg BID keppra  Continue valproate 500 mg q6h    PT OT: recommending postacute rehab. She is a 2-3 person max assist. Spoke at length w patient's caregiver and girlfriend on 12/4 and patient and girlfriend are both in agreement to go home w home PT OT/HH. Patient refuses rehab and caregiver is willing to be there around the clock to provide support. D/c is pending other medical issues - mainly warfarin bridging     Paroxysmal atrial fibrillation (HCC)  Noted on admission, likely due to metabolic stress from seizure, shock/hemodynamic changes. Rate controlled <110 without BB, but has been borderline w rates in 100s more recently.    Recent Labs     12/06/24  0626 12/07/24  0843 12/08/24  0605   INR 1.60* 3.98* 5.33*     Known cost issues w DOACs per girlfriend, so this is not an option  Started warfarin bridge 12/4 onwards. Need at least 3 days on drip. Target 2.0-3.0  S/p 5 mg, 10 mg, 10 mg of warfarin single doses  Held warfarin on 12/7 and 12/8  OFF of heparin drip    Plan:  Continue 25 mg BID propanolol (given hyperthyroid labs)  Hold warfarin for 12/8 evening  Daily INR  Anticipate d/c to home once INR begins downtrending    Dyslipidemia  Continue atorvastatin 10 mg daily  COPD with asthma (HCC)  Continue xopenex/atrovent   Outpatient PFTs  O2 eval closer to d/c; currently on 2L n/c. Girlfriend states pt has 2-3 L N/C on baseline w home O2 concentrator  Hypothyroid  At home on  levothyroxine 100 mcg po qd  Lab Results   Component Value Date    UZN1FLXYMBYP 0.295 (L) 11/14/2024    FREET4 1.23 (H) 11/14/2024   Labs show slightly hyperthyroid - may be contributing to afib.    Plan:  Will slowly dose decrease to 88 mcg po qd, follow up outpt labs 4-6 weeks    GERD (gastroesophageal reflux disease)  Continue PPI  Anxiety  Continue zoloft 50 mg daily  Morbid obesity with BMI of 50.0-59.9, adult (HCC)  Complicates all facets of care  Contributing to poor functional status  Should be started on GLP1-ag outpatient - defer to PCP  Depression, recurrent (HCC)  Continue home zoloft  CKD (chronic kidney disease)  Lab Results   Component Value Date    EGFR 51 12/07/2024    EGFR 47 12/06/2024    EGFR 46 12/05/2024    CREATININE 1.12 12/07/2024    CREATININE 1.20 12/06/2024    CREATININE 1.23 12/05/2024     No RADHA  Avoid nephrotoxins  Type 2 diabetes mellitus with hyperglycemia, unspecified whether long term insulin use (HCC)  Lab Results   Component Value Date    HGBA1C 5.7 (H) 11/13/2024     Fasting BG within goal range, no SSI needed    Blood Sugar Average: Last 72 hrs:    Remains well controlled while inpatient  On dental soft/dysphagia 3 diet w/o carb modifier - continue  Bacteriuria  Completed course of zosyn x 3d to cover for urinary infectious source with shock, AMS on presentation   Noted pyruia on admission w (+) Ucx for ESBL Proteus, and E. Coli  No further abx indicated  Acute on chronic hypoxic respiratory failure (HCC)  On 3 L NC at home per girlfriend    Negative PNA workup: CXR, bronch (resp viktoriya mixed) on 11/13. Monitor off abx  Was initially intubated for airway protection from seizure.  Now on 4 L N/C, suspect component of untreated/undiagnosed SASHA due to body habitus, plus COPD as documented elsewhere    TTE 11/14 showed LVEF 60%, moderate concentric hypertrophy, poor windows/view of RV so difficult to assess RVSP.     Plan:  Outpatient sleep study  Home O2 eval closer to  discharge  Monitor off abx  Outpatient pulm f/u for formal PFTs  Resp protocol    Bacteremia due to Staphylococcus  1/2 Bcx (+) staph epi, likely contaminant - monitor off abx  Thigh hematoma, left, initial encounter  Noted 12/2. Started on IV vanc empirically 12/2 for infection. CT consistent w hematoma  Vanc d/c'd on 12/3 by ID.  ID has signed off    Plan:  Monitor off abx for now  Monitor WBC, temps    VTE Pharmacologic Prophylaxis: VTE Score: 6 High Risk (Score >/= 5) - Pharmacological DVT Prophylaxis Ordered: heparin drip. Sequential Compression Devices Ordered.    Mobility:   Basic Mobility Inpatient Raw Score: 8  JH-HLM Goal: 3: Sit at edge of bed  JH-HLM Achieved: 3: Sit at edge of bed  JH-HLM Goal NOT achieved. Continue with multidisciplinary rounding and encourage appropriate mobility to improve upon -HLM goals.    Patient Centered Rounds: I performed bedside rounds with nursing staff today.   Discussions with Specialists or Other Care Team Provider: n/a    Education and Discussions with Family / Patient: Updated  (girlfriend) at bedside.    Current Length of Stay: 26 day(s)  Current Patient Status: Inpatient   Certification Statement: The patient will continue to require additional inpatient hospital stay due to need for warfarin bridge w INR optimization within near-goal range 2.0-3.0   Discharge Plan: Anticipate discharge in 24-48 hrs to home with home services.    Code Status: Level 2 - DNAR: but accepts endotracheal intubation    Subjective   Patient has no complaints today. Expresses wish to go home, and reiterated need for safer INR level prior to d/c. Girlfriend at bedside reports no issues. Requesting for patient to be moved to chair and sit up for the day.     Objective :  Temp:  [97.4 °F (36.3 °C)-98.1 °F (36.7 °C)] 98.1 °F (36.7 °C)  HR:  [66-90] 75  BP: ()/(55-70) 112/64  SpO2:  [96 %-100 %] 98 %  O2 Device: Nasal cannula  Nasal Cannula O2 Flow Rate (L/min):  [3 L/min] 3  L/min    Body mass index is 45.98 kg/m².     Input and Output Summary (last 24 hours):     Intake/Output Summary (Last 24 hours) at 12/8/2024 0841  Last data filed at 12/7/2024 0945  Gross per 24 hour   Intake --   Output 225 ml   Net -225 ml         Physical Exam  Vitals reviewed.   Constitutional:       General: She is not in acute distress.     Appearance: She is ill-appearing. She is not diaphoretic.   HENT:      Head: Normocephalic and atraumatic.      Mouth/Throat:      Mouth: Mucous membranes are moist.      Pharynx: Oropharynx is clear.   Eyes:      General: No scleral icterus.  Cardiovascular:      Rate and Rhythm: Normal rate and regular rhythm.      Heart sounds: No murmur heard.     No friction rub. No gallop.   Pulmonary:      Effort: Pulmonary effort is normal. No respiratory distress.      Comments: Diminished BS b/l  Abdominal:      General: There is no distension.      Palpations: Abdomen is soft.      Tenderness: There is no abdominal tenderness. There is no guarding.   Musculoskeletal:      Right lower leg: Edema (trace) present.      Left lower leg: Edema (trace) present.      Comments:   Moving all 4 extremities    Skin:     General: Skin is warm and dry.   Psychiatric:         Mood and Affect: Mood normal.     Lines/Drains: none      Lab Results: I have reviewed the following results:   Results from last 7 days   Lab Units 12/07/24  0647 12/06/24  0626 12/05/24  0618   WBC Thousand/uL 5.11   < > 5.42   HEMOGLOBIN g/dL 10.4*   < > 9.7*   HEMATOCRIT % 36.3   < > 33.4*   PLATELETS Thousands/uL 294   < > 359   BANDS PCT %  --   --  1   LYMPHO PCT %  --   --  26   MONO PCT %  --   --  9   EOS PCT %  --   --  2    < > = values in this interval not displayed.     Results from last 7 days   Lab Units 12/07/24  0647 12/05/24  0618 12/04/24  0902   SODIUM mmol/L 142   < > 143   POTASSIUM mmol/L 5.0   < > 4.1   CHLORIDE mmol/L 101   < > 106   CO2 mmol/L 35*   < > 33*   BUN mg/dL 13   < > 21   CREATININE  mg/dL 1.12   < > 1.18   ANION GAP mmol/L 6   < > 4   CALCIUM mg/dL 9.6   < > 8.7   ALBUMIN g/dL  --   --  2.8*   TOTAL BILIRUBIN mg/dL  --   --  0.30   ALK PHOS U/L  --   --  58   ALT U/L  --   --  8   AST U/L  --   --  12*   GLUCOSE RANDOM mg/dL 90   < > 88    < > = values in this interval not displayed.     Results from last 7 days   Lab Units 12/08/24  0605   INR  5.33*     Results from last 7 days   Lab Units 12/02/24  1139 12/02/24  0114 12/01/24  1200   POC GLUCOSE mg/dl 91 84 98                 Recent Cultures (last 7 days):   Results from last 7 days   Lab Units 12/02/24  1057   BLOOD CULTURE  No Growth After 5 Days.  No Growth After 5 Days.       Imaging Results Review: I reviewed radiology reports from this admission including: CT head.  Other Study Results Review: EKG was reviewed.     Last 24 Hours Medication List:     Current Facility-Administered Medications:     acetaminophen (TYLENOL) oral suspension 650 mg, Q4H PRN    albuterol inhalation solution 2.5 mg, Q6H PRN    atorvastatin (LIPITOR) tablet 10 mg, Daily With Dinner    guaiFENesin (ROBITUSSIN) oral liquid 200 mg, Q4H PRN    heparin (porcine) 25,000 units in 0.45% NaCl 250 mL infusion (premix), Titrated, Last Rate: Stopped (12/08/24 0745)    heparin (porcine) injection 10,000 Units, Q6H PRN    heparin (porcine) injection 5,000 Units, Q6H PRN    levalbuterol (XOPENEX) inhalation solution 1.25 mg, TID    levETIRAcetam (KEPPRA) tablet 750 mg, Q12H LINO    levothyroxine tablet 88 mcg, Early Morning    lidocaine (LIDODERM) 5 % patch 2 patch, Daily    moisture barrier miconazole 2% cream (aka EARLINE MOISTURE BARRIER ANTIFUNGAL CREAM), BID    ondansetron (ZOFRAN) injection 4 mg, Q4H PRN    pantoprazole (PROTONIX) injection 40 mg, Q12H LINO    propranolol (INDERAL) oral solution 20 mg, Q12H LINO    sertraline (ZOLOFT) tablet 50 mg, Daily    sodium chloride 3 % inhalation solution 4 mL, TID    thiamine tablet 100 mg, Daily    valproic acid (DEPAKENE) oral  soln 500 mg, Q6H LINO    Administrative Statements   Today, Patient Was Seen By: Thuy Son MD  I have spent a total time of 40 minutes in caring for this patient on the day of the visit/encounter including Impressions, Counseling / Coordination of care, Documenting in the medical record, Reviewing / ordering tests, medicine, procedures  , Obtaining or reviewing history  , and Communicating with other healthcare professionals .    **Please Note: This note may have been constructed using a voice recognition system.**

## 2024-12-09 ENCOUNTER — HOME HEALTH ADMISSION (OUTPATIENT)
Dept: HOME HEALTH SERVICES | Facility: HOME HEALTHCARE | Age: 64
End: 2024-12-09
Payer: COMMERCIAL

## 2024-12-09 VITALS
TEMPERATURE: 97.8 F | BODY MASS INDEX: 45.28 KG/M2 | DIASTOLIC BLOOD PRESSURE: 65 MMHG | RESPIRATION RATE: 18 BRPM | WEIGHT: 265.21 LBS | HEIGHT: 64 IN | HEART RATE: 76 BPM | OXYGEN SATURATION: 98 % | SYSTOLIC BLOOD PRESSURE: 111 MMHG

## 2024-12-09 LAB
ANION GAP SERPL CALCULATED.3IONS-SCNC: 2 MMOL/L (ref 4–13)
ANISOCYTOSIS BLD QL SMEAR: PRESENT
BASOPHILS # BLD MANUAL: 0.05 THOUSAND/UL (ref 0–0.1)
BASOPHILS NFR MAR MANUAL: 1 % (ref 0–1)
BUN SERPL-MCNC: 14 MG/DL (ref 5–25)
CALCIUM SERPL-MCNC: 9.6 MG/DL (ref 8.4–10.2)
CHLORIDE SERPL-SCNC: 100 MMOL/L (ref 96–108)
CO2 SERPL-SCNC: 42 MMOL/L (ref 21–32)
CREAT SERPL-MCNC: 1.26 MG/DL (ref 0.6–1.3)
EOSINOPHIL # BLD MANUAL: 0.09 THOUSAND/UL (ref 0–0.4)
EOSINOPHIL NFR BLD MANUAL: 2 % (ref 0–6)
ERYTHROCYTE [DISTWIDTH] IN BLOOD BY AUTOMATED COUNT: 22.3 % (ref 11.6–15.1)
GFR SERPL CREATININE-BSD FRML MDRD: 44 ML/MIN/1.73SQ M
GLUCOSE SERPL-MCNC: 86 MG/DL (ref 65–140)
HCT VFR BLD AUTO: 30.6 % (ref 34.8–46.1)
HGB BLD-MCNC: 8.6 G/DL (ref 11.5–15.4)
HYPERCHROMIA BLD QL SMEAR: PRESENT
INR PPP: 3.3 (ref 0.85–1.19)
LYMPHOCYTES # BLD AUTO: 1.2 THOUSAND/UL (ref 0.6–4.47)
LYMPHOCYTES # BLD AUTO: 24 % (ref 14–44)
MACROCYTES BLD QL AUTO: PRESENT
MCH RBC QN AUTO: 33.2 PG (ref 26.8–34.3)
MCHC RBC AUTO-ENTMCNC: 28.1 G/DL (ref 31.4–37.4)
MCV RBC AUTO: 118 FL (ref 82–98)
MISCELLANEOUS LAB TEST RESULT: NORMAL
MONOCYTES # BLD AUTO: 0.46 THOUSAND/UL (ref 0–1.22)
MONOCYTES NFR BLD: 10 % (ref 4–12)
NEUTROPHILS # BLD MANUAL: 2.82 THOUSAND/UL (ref 1.85–7.62)
NEUTS BAND NFR BLD MANUAL: 5 % (ref 0–8)
NEUTS SEG NFR BLD AUTO: 56 % (ref 43–75)
PLATELET # BLD AUTO: 211 THOUSANDS/UL (ref 149–390)
PLATELET BLD QL SMEAR: ADEQUATE
PMV BLD AUTO: 9.8 FL (ref 8.9–12.7)
POIKILOCYTOSIS BLD QL SMEAR: PRESENT
POLYCHROMASIA BLD QL SMEAR: PRESENT
POTASSIUM SERPL-SCNC: 3.9 MMOL/L (ref 3.5–5.3)
PROTHROMBIN TIME: 33.2 SECONDS (ref 12.3–15)
RBC # BLD AUTO: 2.59 MILLION/UL (ref 3.81–5.12)
RBC MORPH BLD: PRESENT
SODIUM SERPL-SCNC: 144 MMOL/L (ref 135–147)
STOMATOCYTES BLD QL SMEAR: PRESENT
VARIANT LYMPHS # BLD AUTO: 2 %
WBC # BLD AUTO: 4.62 THOUSAND/UL (ref 4.31–10.16)
WNV RNA SPEC QL NAA+PROBE: NORMAL

## 2024-12-09 PROCEDURE — 85610 PROTHROMBIN TIME: CPT

## 2024-12-09 PROCEDURE — 85027 COMPLETE CBC AUTOMATED: CPT

## 2024-12-09 PROCEDURE — 94664 DEMO&/EVAL PT USE INHALER: CPT

## 2024-12-09 PROCEDURE — 99239 HOSP IP/OBS DSCHRG MGMT >30: CPT

## 2024-12-09 PROCEDURE — 97535 SELF CARE MNGMENT TRAINING: CPT

## 2024-12-09 PROCEDURE — 85007 BL SMEAR W/DIFF WBC COUNT: CPT

## 2024-12-09 PROCEDURE — 80048 BASIC METABOLIC PNL TOTAL CA: CPT

## 2024-12-09 RX ORDER — LEVOTHYROXINE SODIUM 88 UG/1
88 TABLET ORAL
Qty: 30 TABLET | Refills: 0 | Status: SHIPPED | OUTPATIENT
Start: 2024-12-10

## 2024-12-09 RX ORDER — VALPROIC ACID 250 MG/5ML
500 SOLUTION ORAL EVERY 6 HOURS SCHEDULED
Qty: 1200 ML | Refills: 0 | Status: SHIPPED | OUTPATIENT
Start: 2024-12-09

## 2024-12-09 RX ORDER — ATORVASTATIN CALCIUM 10 MG/1
10 TABLET, FILM COATED ORAL
Qty: 30 TABLET | Refills: 0 | Status: SHIPPED | OUTPATIENT
Start: 2024-12-09

## 2024-12-09 RX ORDER — LEVETIRACETAM 750 MG/1
750 TABLET ORAL EVERY 12 HOURS SCHEDULED
Qty: 60 TABLET | Refills: 0 | Status: SHIPPED | OUTPATIENT
Start: 2024-12-09

## 2024-12-09 RX ORDER — ALBUTEROL SULFATE 90 UG/1
2 INHALANT RESPIRATORY (INHALATION) EVERY 4 HOURS PRN
Status: DISCONTINUED | OUTPATIENT
Start: 2024-12-09 | End: 2024-12-09 | Stop reason: HOSPADM

## 2024-12-09 RX ORDER — WARFARIN SODIUM 1 MG/1
1 TABLET ORAL
Qty: 120 TABLET | Refills: 0 | Status: SHIPPED | OUTPATIENT
Start: 2024-12-09

## 2024-12-09 RX ORDER — PROPRANOLOL HYDROCHLORIDE 20 MG/5ML
20 SOLUTION ORAL EVERY 12 HOURS SCHEDULED
Qty: 300 ML | Refills: 0 | Status: SHIPPED | OUTPATIENT
Start: 2024-12-09

## 2024-12-09 RX ORDER — LANOLIN ALCOHOL/MO/W.PET/CERES
100 CREAM (GRAM) TOPICAL DAILY
Qty: 30 TABLET | Refills: 0 | Status: SHIPPED | OUTPATIENT
Start: 2024-12-09

## 2024-12-09 RX ADMIN — PANTOPRAZOLE SODIUM 40 MG: 40 INJECTION, POWDER, FOR SOLUTION INTRAVENOUS at 07:56

## 2024-12-09 RX ADMIN — VALPROIC ACID 500 MG: 500 SOLUTION ORAL at 05:37

## 2024-12-09 RX ADMIN — SERTRALINE HYDROCHLORIDE 50 MG: 50 TABLET ORAL at 07:56

## 2024-12-09 RX ADMIN — VALPROIC ACID 500 MG: 500 SOLUTION ORAL at 11:59

## 2024-12-09 RX ADMIN — PROPRANOLOL HYDROCHLORIDE 20 MG: 20 SOLUTION ORAL at 07:56

## 2024-12-09 RX ADMIN — LEVOTHYROXINE SODIUM 88 MCG: 88 TABLET ORAL at 05:37

## 2024-12-09 RX ADMIN — MICONAZOLE NITRATE 1 APPLICATION: 20 CREAM TOPICAL at 07:54

## 2024-12-09 RX ADMIN — THIAMINE HCL TAB 100 MG 100 MG: 100 TAB at 07:57

## 2024-12-09 RX ADMIN — LEVETIRACETAM 750 MG: 750 TABLET, FILM COATED ORAL at 07:57

## 2024-12-09 NOTE — PROGRESS NOTES
Patient:  DHAVAL SANDERS    MRN:  [id number]    Aidin Request ID:  [id number]    Level of care reserved:  Home Health Agency    Partner Reserved:  [facility], [place] [address] [phone]    Clinical needs requested:    Geography searched:  [phone]    Start of Service:    Request sent:  9:36am EST on 12/9/2024 by Reba Goel    Partner reserved:  11:29am EST on 12/9/2024 by Reba Goel    Choice list shared:  11:29am EST on 12/9/2024 by Reba Goel

## 2024-12-09 NOTE — CASE MANAGEMENT
Case Management Discharge Planning Note    Patient name Adelina HUNG [place]  Location [facility]01 MRN [id number]  : 1960 Date 2024       Current Admission Date: 2024  Current Admission Diagnosis:Seizure (HCC)   Patient Active Problem List    Diagnosis Date Noted Date Diagnosed    Thigh hematoma, left, initial encounter 2024     Bacteremia due to Staphylococcus 2024     Bacteriuria 11/15/2024     Acute on chronic hypoxic respiratory failure (HCC) 11/15/2024     Seizure ([facility]) 2024     Diabetic nephropathy associated with type 2 diabetes mellitus (HCC) 2024     Coagulopathy ([facility]) 2024     Type 2 diabetes mellitus with hyperglycemia, unspecified whether long term insulin use (HCC) 2024     Chronic anticoagulation 2023     Chronic respiratory failure with hypercapnia ([facility]) 2023     CKD (chronic kidney disease) 2023     Anemia in stage 3b chronic kidney disease  (HCC) 2023     Chronic respiratory failure with hypoxia ([facility]) 2023     History of hysterectomy 2022     Panniculitis 2022     Abnormal CT scan 2022     Panniculus 2022     Bilateral pleural effusion 2022     Cardiomegaly 2022     Depression, recurrent (HCC) 2022     Pre-ulcerative corn or callous 2022     Morbid obesity with BMI of 50.0-59.9, adult (HCC) 2021     Secondary hyperparathyroidism of renal origin (HCC) 2021     GERD (gastroesophageal reflux disease) 2020     Anxiety 2020     Chronic constipation 2020     Paroxysmal atrial fibrillation (HCC) 2020     COPD with asthma (HCC) 2020     Hypothyroid 2020     Cellulitis 09/10/2020     H/O right nephrectomy 09/10/2020     Hyperparathyroidism (HCC) 2020     Dyslipidemia 2019     Hypertensive chronic kidney disease with stage 1 through stage 4 chronic kidney disease, or unspecified chronic kidney disease  10/04/2018     Persistent proteinuria 10/04/2018     Iron deficiency 10/04/2018       LOS (days): 27  Geometric Mean LOS (GMLOS) (days): 8.7  Days to GMLOS:-17.8     OBJECTIVE:  Risk of Unplanned Readmission Score: 24.3         Current admission status: Inpatient   Preferred Pharmacy:   [id number] - [place], [place] - [address]  [address] [address]  Phone: [phone] Fax: [phone]    [facility] - [place], [place] - [address]  [address]  [address]  [facility] [address]  Phone: [phone] Fax: [phone]    Primary Care Provider: TULIO Beaver    Primary Insurance: [facility]  Secondary Insurance: [facility]    DISCHARGE DETAILS:       Requested Home Health Care         Is the patient interested in HHC at discharge?: Yes  Home Health Discipline requested:: Nursing, Occupational Therapy, Physical Therapy  Home Health Agency Name:: [facility]  Home Health Services Needed:: Oxygen Via Nasal Cannula, Restore Joint Function Post Joint Replacement, Strengthening/Theraputic Exercises to Improve Function  Homebound Criteria Met:: Requires the Assistance of Another Person for Safe Ambulation or to Leave the Home  Supporting Clincal Findings:: Bed Bound or Wheelchair Bound, Fatigues Easliy in Short Distances, Limited Endurance, Dyspnea with Exertion                                                                Additional Comments: Patient is requesting [facility]NA- referrals entered in [facility]

## 2024-12-09 NOTE — OCCUPATIONAL THERAPY NOTE
Occupational Therapy Progress Note     Patient Name: Adelina Soto  Today's Date: 12/9/2024  Problem List  Principal Problem:    Seizure (HCC)  Active Problems:    Dyslipidemia    Paroxysmal atrial fibrillation (HCC)    COPD with asthma (HCC)    Hypothyroid    GERD (gastroesophageal reflux disease)    Anxiety    Morbid obesity with BMI of 50.0-59.9, adult (HCC)    Depression, recurrent (HCC)    CKD (chronic kidney disease)    Type 2 diabetes mellitus with hyperglycemia, unspecified whether long term insulin use (HCC)    Bacteriuria    Acute on chronic hypoxic respiratory failure (HCC)    Bacteremia due to Staphylococcus    Thigh hematoma, left, initial encounter         Occupational Therapy Treatment Note     12/09/24 0922   OT Last Visit   OT Visit Date 12/09/24   Note Type   Note Type Treatment   Pain Assessment   Pain Assessment Tool FLACC   Pain Rating: FLACC (Rest) - Face 1   Pain Rating: FLACC (Rest) - Legs 0   Pain Rating: FLACC (Rest) - Activity 0   Pain Rating: FLACC (Rest) - Cry 1   Pain Rating: FLACC (Rest) - Consolability 0   Score: FLACC (Rest) 2   Pain Rating: FLACC (Activity) - Face 1   Pain Rating: FLACC (Activity) - Legs 0   Pain Rating: FLACC (Activity) - Activity 0   Pain Rating: FLACC (Activity) - Cry 1   Pain Rating: FLACC (Activity) - Consolability 0   Score: FLACC (Activity) 2   Restrictions/Precautions   Weight Bearing Precautions Per Order No   Lifestyle   Autonomy receives assist from caretaker/so for adls, able to transfer with supervision, I with management of motorized w/c - s/o manages iadls   Reciprocal Relationships supportive family/caretaker   Service to Others not working   Intrinsic Gratification sedentary -watches TV and plays computer games   ADL   Where Assessed Edge of bed   Grooming Assistance 5  Supervision/Setup   Grooming Deficit Wash/dry face;Wash/dry hands   UB Bathing Assistance 3  Moderate Assistance   UB Bathing Deficit Chest;Right arm;Left arm;Abdomen   LB  "Bathing Assistance 2  Maximal Assistance   UB Dressing Assistance 3  Moderate Assistance   UB Dressing Deficit Thread RUE;Thread LUE;Pull around back   LB Dressing Assistance 1  Total Assistance   LB Dressing Deficit Don/doff R sock;Don/doff L sock   Bed Mobility   Rolling R 2  Maximal assistance   Additional items Assist x 1   Rolling L 2  Maximal assistance   Additional items Assist x 1   Supine to Sit 2  Maximal assistance   Additional items Assist x 2   Sit to Supine 2  Maximal assistance   Additional items Assist x 3   Transfers   Sit to Stand Unable to assess   Subjective   Subjective pt stated \"it's not going to work out today\" referring to getting out of bed   Cognition   Overall Cognitive Status WFL   Arousal/Participation Alert;Cooperative   Attention Within functional limits   Orientation Level Oriented X4   Memory Decreased recall of precautions   Following Commands Follows one step commands without difficulty   Activity Tolerance   Activity Tolerance Patient tolerated treatment well   Assessment   Assessment Pt seen for Occupational Therapy session with focus on activity tolerance, bed mob, sitting balance and tolerance for pt engagement in UB/LB self-care tasks. Pt cleared by RN/Malou for pt participated in OT session. Pt presented supine/HOB raised pt awake/alert and agreeable to participate in therapy following pt identifiers confirmed.  Pt required assist for  bed mob, and UB/LB self-care tasks 2* deconditioning. Although she was able to tolerate  sitting at edge of pt bed for over 20 mins during AM self-care Pt remains appropriate for II (Moderate Resources)  Pt return to bed post session bed alarm activated and all needs within reach   Plan   Treatment Interventions ADL retraining;Functional transfer training   Goal Expiration Date 12/16/24   OT Treatment Day 1   OT Frequency 2-3x/wk   Discharge Recommendation   Rehab Resource Intensity Level, OT II (Moderate Resource Intensity)   AM-PAC Daily " Activity Inpatient   Lower Body Dressing 1   Bathing 1   Toileting 1   Upper Body Dressing 1   Grooming 1   Eating 1   Daily Activity Raw Score 6   AM-PAC Applied Cognition Inpatient   Following a Speech/Presentation 3   Understanding Ordinary Conversation 4   Taking Medications 3   Remembering Where Things Are Placed or Put Away 3   Remembering List of 4-5 Errands 3   Taking Care of Complicated Tasks 3   Applied Cognition Raw Score 19   Applied Cognition Standardized Score 39.77   Barthel Index   Feeding 5   Bathing 0   Grooming Score 0   Dressing Score 5   Bladder Score 5   Bowels Score 5   Toilet Use Score 5   Transfers (Bed/Chair) Score 5   Mobility (Level Surface) Score 0   Stairs Score 0   Barthel Index Score 30       Tia CHAPMAN/RADHA

## 2024-12-09 NOTE — PLAN OF CARE
Problem: PAIN - ADULT  Goal: Verbalizes/displays adequate comfort level or baseline comfort level  Description: Interventions:  - Encourage patient to monitor pain and request assistance  - Assess pain using appropriate pain scale  - Administer analgesics based on type and severity of pain and evaluate response  - Implement non-pharmacological measures as appropriate and evaluate response  - Notify physician/advanced practitioner if interventions unsuccessful or patient reports new pain  Outcome: Adequate for Discharge     Problem: INFECTION - ADULT  Goal: Absence or prevention of progression during hospitalization  Description: INTERVENTIONS:  - Assess and monitor for signs and symptoms of infection  - Monitor lab/diagnostic results  - Monitor all insertion sites, i.e. indwelling lines, tubes, and drains  - [place] appropriate cooling/warming therapies per order  - Administer medications as ordered  - Instruct and encourage patient and family to use good hand hygiene technique  - Identify and instruct in appropriate isolation precautions for identified infection/condition  Outcome: Adequate for Discharge     Problem: SAFETY ADULT  Goal: Patient will remain free of falls  Description: INTERVENTIONS:  - Educate patient/family on patient safety including physical limitations  - Instruct patient to call for assistance with activity   - Consult OT/PT to assist with strengthening/mobility   - Keep Call bell within reach  - Keep bed low and locked with side rails adjusted as appropriate  - Keep care items and personal belongings within reach  - Initiate and maintain comfort rounds  - Make Fall Risk Sign visible to staff  - Offer Toileting every 2 Hours, in advance of need  - Initiate/Maintain bed alarm  - Apply yellow socks and bracelet for high fall risk patients  - Consider moving patient to room near nurses station  Outcome: Adequate for Discharge  Goal: Maintain or return to baseline ADL function  Description:  INTERVENTIONS:  -  Assess patient's ability to carry out ADLs; assess patient's baseline for ADL function and identify physical deficits which impact ability to perform ADLs (bathing, care of mouth/teeth, toileting, grooming, dressing, etc.)  - Assess/evaluate cause of self-care deficits   - Assess range of motion  - Assess patient's mobility; develop plan if impaired  - Assess patient's need for assistive devices and provide as appropriate  - Encourage maximum independence but intervene and supervise when necessary  - Involve family in performance of ADLs  - Assess for home care needs following discharge   - Consider OT consult to assist with ADL evaluation and planning for discharge  - Provide patient education as appropriate  Outcome: Adequate for Discharge  Goal: Maintains/Returns to pre admission functional level  Description: INTERVENTIONS:  - Perform AM-PAC 6 Click Basic Mobility/ Daily Activity assessment daily.  - Set and communicate daily mobility goal to care team and patient/family/caregiver.   - Collaborate with rehabilitation services on mobility goals if consulted  - Perform Range of Motion 3 times a day.  - Reposition patient every 2 hours.  - Dangle patient 3 times a day  - Stand patient 2 times a day  - Ambulate patient 3 times a day  - Out of bed to chair 2 times a day   - Out of bed for meals 3 times a day  - Out of bed for toileting  - Record patient progress and toleration of activity level   Outcome: Adequate for Discharge     Problem: DISCHARGE PLANNING  Goal: Discharge to home or other facility with appropriate resources  Description: INTERVENTIONS:  - Identify barriers to discharge w/patient and caregiver  - Arrange for needed discharge resources and transportation as appropriate  - Identify discharge learning needs (meds, wound care, etc.)  - Refer to Case Management Department for coordinating discharge planning if the patient needs post-hospital services based on physician/advanced  practitioner order or complex needs related to functional status, cognitive ability, or social support system  Outcome: Adequate for Discharge     Problem: Knowledge Deficit  Goal: Patient/family/caregiver demonstrates understanding of disease process, treatment plan, medications, and discharge instructions  Description: Complete learning assessment and assess knowledge base.  Interventions:  - Provide teaching at level of understanding  - Provide teaching via preferred learning methods  Outcome: Adequate for Discharge     Problem: NEUROSENSORY - ADULT  Goal: Achieves stable or improved neurological status  Description: INTERVENTIONS  - Monitor and report changes in neurological status  - Monitor vital signs such as temperature, blood pressure, glucose, and any other labs ordered   - Initiate measures to prevent increased intracranial pressure  - Monitor for seizure activity and implement precautions if appropriate      Outcome: Adequate for Discharge  Goal: Remains free of injury related to seizures activity  Description: INTERVENTIONS  - Maintain airway, patient safety  and administer oxygen as ordered  - Monitor patient for seizure activity, document and report duration and description of seizure to physician/advanced practitioner  - If seizure occurs,  ensure patient safety during seizure  - Reorient patient post seizure  - Seizure pads on all 4 side rails  - Instruct patient/family to notify RN of any seizure activity including if an aura is experienced  - Instruct patient/family to call for assistance with activity based on nursing assessment  - Administer anti-seizure medications if ordered    Outcome: Adequate for Discharge  Goal: Achieves maximal functionality and self care  Description: INTERVENTIONS  - Monitor swallowing and airway patency with patient fatigue and changes in neurological status  - Encourage and assist patient to increase activity and self care.   - Encourage visually impaired, hearing  impaired and aphasic patients to use assistive/communication devices  Outcome: Adequate for Discharge     Problem: CARDIOVASCULAR - ADULT  Goal: Maintains optimal cardiac output and hemodynamic stability  Description: INTERVENTIONS:  - Monitor I/O, vital signs and rhythm  - Monitor for S/S and trends of decreased cardiac output  - Administer and titrate ordered vasoactive medications to optimize hemodynamic stability  - Assess quality of pulses, skin color and temperature  - Assess for signs of decreased coronary artery perfusion  - Instruct patient to report change in severity of symptoms  Outcome: Adequate for Discharge  Goal: Absence of cardiac dysrhythmias or at baseline rhythm  Description: INTERVENTIONS:  - Continuous cardiac monitoring, vital signs, obtain 12 lead EKG if ordered  - Administer antiarrhythmic and heart rate control medications as ordered  - Monitor electrolytes and administer replacement therapy as ordered  Outcome: Adequate for Discharge     Problem: RESPIRATORY - ADULT  Goal: Achieves optimal ventilation and oxygenation  Description: INTERVENTIONS:  - Assess for changes in respiratory status  - Assess for changes in mentation and behavior  - Position to facilitate oxygenation and minimize respiratory effort  - Oxygen administered by appropriate delivery if ordered  - Initiate smoking cessation education as indicated  - Encourage broncho-pulmonary hygiene including cough, deep breathe, Incentive Spirometry  - Assess the need for suctioning and aspirate as needed  - Assess and instruct to report SOB or any respiratory difficulty  - Respiratory Therapy support as indicated  Outcome: Adequate for Discharge     Problem: GASTROINTESTINAL - ADULT  Goal: Maintains or returns to baseline bowel function  Description: INTERVENTIONS:  - Assess bowel function  - Encourage oral fluids to ensure adequate hydration  - Administer IV fluids if ordered to ensure adequate hydration  - Administer ordered  medications as needed  - Encourage mobilization and activity  - Consider nutritional services referral to assist patient with adequate nutrition and appropriate food choices  Outcome: Adequate for Discharge  Goal: Maintains adequate nutritional intake  Description: INTERVENTIONS:  - Monitor percentage of each meal consumed  - Identify factors contributing to decreased intake, treat as appropriate  - Assist with meals as needed  - Monitor I&O, weight, and lab values if indicated  - Obtain nutrition services referral as needed  Outcome: Adequate for Discharge     Problem: GENITOURINARY - ADULT  Goal: Maintains or returns to baseline urinary function  Description: INTERVENTIONS:  - Assess urinary function  - Encourage oral fluids to ensure adequate hydration if ordered  - Administer IV fluids as ordered to ensure adequate hydration  - Administer ordered medications as needed  - Offer frequent toileting  - Follow urinary retention protocol if ordered  Outcome: Adequate for Discharge     Problem: METABOLIC, FLUID AND ELECTROLYTES - ADULT  Goal: Electrolytes maintained within normal limits  Description: INTERVENTIONS:  - Monitor labs and assess patient for signs and symptoms of electrolyte imbalances  - Administer electrolyte replacement as ordered  - Monitor response to electrolyte replacements, including repeat lab results as appropriate  - Instruct patient on fluid and nutrition as appropriate  Outcome: Adequate for Discharge  Goal: Fluid balance maintained  Description: INTERVENTIONS:  - Monitor labs   - Monitor I/O and WT  - Instruct patient on fluid and nutrition as appropriate  - Assess for signs & symptoms of volume excess or deficit  Outcome: Adequate for Discharge  Goal: Glucose maintained within target range  Description: INTERVENTIONS:  - Monitor Blood Glucose as ordered  - Assess for signs and symptoms of hyperglycemia and hypoglycemia  - Administer ordered medications to maintain glucose within target  range  - Assess nutritional intake and initiate nutrition service referral as needed  Outcome: Adequate for Discharge     Problem: SKIN/TISSUE INTEGRITY - ADULT  Goal: Skin Integrity remains intact(Skin Breakdown Prevention)  Description: Assess:  -Perform [place] assessment every shift  -Clean and moisturize skin  -Inspect skin when repositioning, toileting, and assisting with ADLS  -Assess extremities for adequate circulation and sensation     Bed Management:  -Have minimal linens on bed & keep smooth, unwrinkled  -Change linens as needed when moist or perspiring  -Avoid sitting or lying in one position for more than 2 hours while in bed    Toileting:  -Offer bedside commode  -Assess for incontinence    Activity:  -Encourage activity and walks on unit  -Encourage or provide ROM exercises   -Turn and reposition patient every 2 Hours  -Use appropriate equipment to lift or move patient in bed    Skin Care:  -Avoid use of baby powder, tape, friction and shearing, hot water or constrictive clothing  -Relieve pressure over bony prominences  -Do not massage red bony areas    Outcome: Adequate for Discharge  Goal: Incision(s), wounds(s) or drain site(s) healing without S/S of infection  Description: INTERVENTIONS  - Assess and document dressing, incision, wound bed, drain sites and surrounding tissue  - Provide patient and family education  Outcome: Adequate for Discharge  Goal: Pressure injury heals and does not worsen  Description: Interventions:  - Implement low air loss mattress or specialty surface (Criteria met)  - Apply silicone foam dressing  - Apply fecal or urinary incontinence containment device   - Turn and reposition patient & offload bony prominences every 2 hours   - Utilize friction reducing device or surface for transfers   - Use incontinent care products after each incontinent episode.  - Consider nutrition services referral as needed  Outcome: Adequate for Discharge     Problem: HEMATOLOGIC -  ADULT  Goal: Maintains hematologic stability  Description: INTERVENTIONS  - Assess for signs and symptoms of bleeding or hemorrhage  - Monitor labs  - Administer supportive blood products/factors as ordered and appropriate  Outcome: Adequate for Discharge     Problem: MUSCULOSKELETAL - ADULT  Goal: Maintain or return mobility to safest level of function  Description: INTERVENTIONS:  - Assess patient's ability to carry out ADLs; assess patient's baseline for ADL function and identify physical deficits which impact ability to perform ADLs (bathing, care of mouth/teeth, toileting, grooming, dressing, etc.)  - Assess/evaluate cause of self-care deficits   - Assess range of motion  - Assess patient's mobility  - Assess patient's need for assistive devices and provide as appropriate  - Encourage maximum independence but intervene and supervise when necessary  - Involve family in performance of ADLs  - Assess for home care needs following discharge   - Consider OT consult to assist with ADL evaluation and planning for discharge  - Provide patient education as appropriate  Outcome: Adequate for Discharge  Goal: Maintain proper alignment of affected body part  Description: INTERVENTIONS:  - Support, maintain and protect limb and body alignment  - Provide patient/ family with appropriate education  Outcome: Adequate for Discharge     Problem: Nutrition/Hydration-ADULT  Goal: Nutrient/Hydration intake appropriate for improving, restoring or maintaining nutritional needs  Description: Monitor and assess patient's nutrition/hydration status for malnutrition. Collaborate with interdisciplinary team and initiate plan and interventions as ordered.  Monitor patient's weight and dietary intake as ordered or per policy. Utilize nutrition screening tool and intervene as necessary. Determine patient's food preferences and provide high-protein, high-caloric foods as appropriate.     INTERVENTIONS:  - Monitor oral intake, urinary output,  labs, and treatment plans  - Assess nutrition and hydration status and recommend course of action  - Evaluate amount of meals eaten  - Assist patient with eating if necessary   - Allow adequate time for meals  - Recommend/ encourage appropriate diets, oral nutritional supplements, and vitamin/mineral supplements  - Order, calculate, and assess calorie counts as needed  - Assess need for intravenous fluids  - Provide specific nutrition/hydration education as appropriate  - Include patient/family/caregiver in decisions related to nutrition  Outcome: Adequate for Discharge     Problem: Prexisting or High Potential for Compromised Skin Integrity  Goal: Skin integrity is maintained or improved  Description: INTERVENTIONS:  - Identify patients at risk for skin breakdown  - Assess and monitor skin integrity  - Assess and monitor nutrition and hydration status  - Monitor labs   - Assess for incontinence   - Turn and reposition patient  - Assist with mobility/ambulation  - Relieve pressure over bony prominences  - Avoid friction and shearing  - Provide appropriate hygiene as needed including keeping skin clean and dry  - Evaluate need for skin moisturizer/barrier cream  - Collaborate with interdisciplinary team   - Patient/family teaching  - Consider wound care consult   Outcome: Adequate for Discharge

## 2024-12-09 NOTE — RESTORATIVE TECHNICIAN NOTE
Restorative Technician Note      Patient Name: Adelina Soto     Note Type: Mobility  Patient Position Upon Consult: Supine  Activity Performed: Dangled; Range of motion; Repositioned  Assistive Device: Other (Comment) (Assist x2 to sit EOB/do ROM exercises.)  Education Provided: Yes  Patient Position at End of Consult: Supine; All needs within reach; Bed/Chair alarm activated    Taurus HENDERSON, Restorative Technician,

## 2024-12-09 NOTE — PLAN OF CARE
Problem: PAIN - ADULT  Goal: Verbalizes/displays adequate comfort level or baseline comfort level  Description: Interventions:  - Encourage patient to monitor pain and request assistance  - Assess pain using appropriate pain scale  - Administer analgesics based on type and severity of pain and evaluate response  - Implement non-pharmacological measures as appropriate and evaluate response  - Notify physician/advanced practitioner if interventions unsuccessful or patient reports new pain  12/9/2024 1419 by Kristy Esparza RN  Outcome: Completed  12/9/2024 1242 by Kristy Esparza RN  Outcome: Adequate for Discharge     Problem: INFECTION - ADULT  Goal: Absence or prevention of progression during hospitalization  Description: INTERVENTIONS:  - Assess and monitor for signs and symptoms of infection  - Monitor lab/diagnostic results  - Monitor all insertion sites, i.e. indwelling lines, tubes, and drains  - [place] appropriate cooling/warming therapies per order  - Administer medications as ordered  - Instruct and encourage patient and family to use good hand hygiene technique  - Identify and instruct in appropriate isolation precautions for identified infection/condition  12/9/2024 1419 by Kristy Esparza RN  Outcome: Completed  12/9/2024 1242 by Kristy Esparza RN  Outcome: Adequate for Discharge     Problem: SAFETY ADULT  Goal: Patient will remain free of falls  Description: INTERVENTIONS:  - Educate patient/family on patient safety including physical limitations  - Instruct patient to call for assistance with activity   - Consult OT/PT to assist with strengthening/mobility   - Keep Call bell within reach  - Keep bed low and locked with side rails adjusted as appropriate  - Keep care items and personal belongings within reach  - Initiate and maintain comfort rounds  - Make Fall Risk Sign visible to staff  - Offer Toileting every 2 Hours, in advance of need  - Initiate/Maintain bed alarm  - Apply  yellow socks and bracelet for high fall risk patients  - Consider moving patient to room near nurses station  12/9/2024 1419 by Kristy Esparza RN  Outcome: Completed  12/9/2024 1242 by Kristy Esparza RN  Outcome: Adequate for Discharge  Goal: Maintain or return to baseline ADL function  Description: INTERVENTIONS:  -  Assess patient's ability to carry out ADLs; assess patient's baseline for ADL function and identify physical deficits which impact ability to perform ADLs (bathing, care of mouth/teeth, toileting, grooming, dressing, etc.)  - Assess/evaluate cause of self-care deficits   - Assess range of motion  - Assess patient's mobility; develop plan if impaired  - Assess patient's need for assistive devices and provide as appropriate  - Encourage maximum independence but intervene and supervise when necessary  - Involve family in performance of ADLs  - Assess for home care needs following discharge   - Consider OT consult to assist with ADL evaluation and planning for discharge  - Provide patient education as appropriate  12/9/2024 1419 by Kristy Esparza RN  Outcome: Completed  12/9/2024 1242 by Kristy Esparza RN  Outcome: Adequate for Discharge  Goal: Maintains/Returns to pre admission functional level  Description: INTERVENTIONS:  - Perform AM-PAC 6 Click Basic Mobility/ Daily Activity assessment daily.  - Set and communicate daily mobility goal to care team and patient/family/caregiver.   - Collaborate with rehabilitation services on mobility goals if consulted  - Perform Range of Motion 3 times a day.  - Reposition patient every 2 hours.  - Dangle patient 3 times a day  - Stand patient 2 times a day  - Ambulate patient 3 times a day  - Out of bed to chair 2 times a day   - Out of bed for meals 3 times a day  - Out of bed for toileting  - Record patient progress and toleration of activity level   12/9/2024 1419 by Kristy Esparza RN  Outcome: Completed  12/9/2024 1242 by Kristy Maciel  LEONIE Esparza  Outcome: Adequate for Discharge     Problem: DISCHARGE PLANNING  Goal: Discharge to home or other facility with appropriate resources  Description: INTERVENTIONS:  - Identify barriers to discharge w/patient and caregiver  - Arrange for needed discharge resources and transportation as appropriate  - Identify discharge learning needs (meds, wound care, etc.)  - Refer to Case Management Department for coordinating discharge planning if the patient needs post-hospital services based on physician/advanced practitioner order or complex needs related to functional status, cognitive ability, or social support system  12/9/2024 1419 by Kristy Esparza RN  Outcome: Completed  12/9/2024 1242 by Kristy Esparza RN  Outcome: Adequate for Discharge     Problem: Knowledge Deficit  Goal: Patient/family/caregiver demonstrates understanding of disease process, treatment plan, medications, and discharge instructions  Description: Complete learning assessment and assess knowledge base.  Interventions:  - Provide teaching at level of understanding  - Provide teaching via preferred learning methods  12/9/2024 1419 by Kristy Esparza RN  Outcome: Completed  12/9/2024 1242 by Kristy Esparza RN  Outcome: Adequate for Discharge     Problem: NEUROSENSORY - ADULT  Goal: Achieves stable or improved neurological status  Description: INTERVENTIONS  - Monitor and report changes in neurological status  - Monitor vital signs such as temperature, blood pressure, glucose, and any other labs ordered   - Initiate measures to prevent increased intracranial pressure  - Monitor for seizure activity and implement precautions if appropriate      12/9/2024 1419 by Kristy Esparza RN  Outcome: Completed  12/9/2024 1242 by Kristy Esparza RN  Outcome: Adequate for Discharge  Goal: Remains free of injury related to seizures activity  Description: INTERVENTIONS  - Maintain airway, patient safety  and administer oxygen as  ordered  - Monitor patient for seizure activity, document and report duration and description of seizure to physician/advanced practitioner  - If seizure occurs,  ensure patient safety during seizure  - Reorient patient post seizure  - Seizure pads on all 4 side rails  - Instruct patient/family to notify RN of any seizure activity including if an aura is experienced  - Instruct patient/family to call for assistance with activity based on nursing assessment  - Administer anti-seizure medications if ordered    12/9/2024 1419 by Kristy Esparza RN  Outcome: Completed  12/9/2024 1242 by Kristy Esparza RN  Outcome: Adequate for Discharge  Goal: Achieves maximal functionality and self care  Description: INTERVENTIONS  - Monitor swallowing and airway patency with patient fatigue and changes in neurological status  - Encourage and assist patient to increase activity and self care.   - Encourage visually impaired, hearing impaired and aphasic patients to use assistive/communication devices  12/9/2024 1419 by Kristy Esparza RN  Outcome: Completed  12/9/2024 1242 by Kristy Esparza RN  Outcome: Adequate for Discharge     Problem: CARDIOVASCULAR - ADULT  Goal: Maintains optimal cardiac output and hemodynamic stability  Description: INTERVENTIONS:  - Monitor I/O, vital signs and rhythm  - Monitor for S/S and trends of decreased cardiac output  - Administer and titrate ordered vasoactive medications to optimize hemodynamic stability  - Assess quality of pulses, skin color and temperature  - Assess for signs of decreased coronary artery perfusion  - Instruct patient to report change in severity of symptoms  12/9/2024 1419 by Kristy Esparza RN  Outcome: Completed  12/9/2024 1242 by Kristy Esparza RN  Outcome: Adequate for Discharge  Goal: Absence of cardiac dysrhythmias or at baseline rhythm  Description: INTERVENTIONS:  - Continuous cardiac monitoring, vital signs, obtain 12 lead EKG if ordered  -  Administer antiarrhythmic and heart rate control medications as ordered  - Monitor electrolytes and administer replacement therapy as ordered  12/9/2024 1419 by Kristy Esparza RN  Outcome: Completed  12/9/2024 1242 by Kristy Esparza RN  Outcome: Adequate for Discharge     Problem: RESPIRATORY - ADULT  Goal: Achieves optimal ventilation and oxygenation  Description: INTERVENTIONS:  - Assess for changes in respiratory status  - Assess for changes in mentation and behavior  - Position to facilitate oxygenation and minimize respiratory effort  - Oxygen administered by appropriate delivery if ordered  - Initiate smoking cessation education as indicated  - Encourage broncho-pulmonary hygiene including cough, deep breathe, Incentive Spirometry  - Assess the need for suctioning and aspirate as needed  - Assess and instruct to report SOB or any respiratory difficulty  - Respiratory Therapy support as indicated  12/9/2024 1419 by Kristy Esparza RN  Outcome: Completed  12/9/2024 1242 by Kristy Esparza RN  Outcome: Adequate for Discharge     Problem: GASTROINTESTINAL - ADULT  Goal: Maintains or returns to baseline bowel function  Description: INTERVENTIONS:  - Assess bowel function  - Encourage oral fluids to ensure adequate hydration  - Administer IV fluids if ordered to ensure adequate hydration  - Administer ordered medications as needed  - Encourage mobilization and activity  - Consider nutritional services referral to assist patient with adequate nutrition and appropriate food choices  12/9/2024 1419 by Kristy Esparza RN  Outcome: Completed  12/9/2024 1242 by Kristy Esparza RN  Outcome: Adequate for Discharge  Goal: Maintains adequate nutritional intake  Description: INTERVENTIONS:  - Monitor percentage of each meal consumed  - Identify factors contributing to decreased intake, treat as appropriate  - Assist with meals as needed  - Monitor I&O, weight, and lab values if indicated  - Obtain  nutrition services referral as needed  12/9/2024 1419 by Kristy Esparaz RN  Outcome: Completed  12/9/2024 1242 by Kristy Esparza RN  Outcome: Adequate for Discharge     Problem: GENITOURINARY - ADULT  Goal: Maintains or returns to baseline urinary function  Description: INTERVENTIONS:  - Assess urinary function  - Encourage oral fluids to ensure adequate hydration if ordered  - Administer IV fluids as ordered to ensure adequate hydration  - Administer ordered medications as needed  - Offer frequent toileting  - Follow urinary retention protocol if ordered  12/9/2024 1419 by Kristy Esparza RN  Outcome: Completed  12/9/2024 1242 by Kristy Esparza RN  Outcome: Adequate for Discharge     Problem: METABOLIC, FLUID AND ELECTROLYTES - ADULT  Goal: Electrolytes maintained within normal limits  Description: INTERVENTIONS:  - Monitor labs and assess patient for signs and symptoms of electrolyte imbalances  - Administer electrolyte replacement as ordered  - Monitor response to electrolyte replacements, including repeat lab results as appropriate  - Instruct patient on fluid and nutrition as appropriate  12/9/2024 1419 by Kristy Esparza RN  Outcome: Completed  12/9/2024 1242 by Kristy Esparza RN  Outcome: Adequate for Discharge  Goal: Fluid balance maintained  Description: INTERVENTIONS:  - Monitor labs   - Monitor I/O and WT  - Instruct patient on fluid and nutrition as appropriate  - Assess for signs & symptoms of volume excess or deficit  12/9/2024 1419 by Kristy Esparza RN  Outcome: Completed  12/9/2024 1242 by Kristy Esparza RN  Outcome: Adequate for Discharge  Goal: Glucose maintained within target range  Description: INTERVENTIONS:  - Monitor Blood Glucose as ordered  - Assess for signs and symptoms of hyperglycemia and hypoglycemia  - Administer ordered medications to maintain glucose within target range  - Assess nutritional intake and initiate nutrition service referral as  needed  12/9/2024 1419 by Kristy Esparza RN  Outcome: Completed  12/9/2024 1242 by Kristy Esparza RN  Outcome: Adequate for Discharge     Problem: SKIN/TISSUE INTEGRITY - ADULT  Goal: Skin Integrity remains intact(Skin Breakdown Prevention)  Description: Assess:  -Perform [place] assessment every shift  -Clean and moisturize skin  -Inspect skin when repositioning, toileting, and assisting with ADLS  -Assess extremities for adequate circulation and sensation     Bed Management:  -Have minimal linens on bed & keep smooth, unwrinkled  -Change linens as needed when moist or perspiring  -Avoid sitting or lying in one position for more than 2 hours while in bed    Toileting:  -Offer bedside commode  -Assess for incontinence    Activity:  -Encourage activity and walks on unit  -Encourage or provide ROM exercises   -Turn and reposition patient every 2 Hours  -Use appropriate equipment to lift or move patient in bed    Skin Care:  -Avoid use of baby powder, tape, friction and shearing, hot water or constrictive clothing  -Relieve pressure over bony prominences  -Do not massage red bony areas    12/9/2024 1419 by Kristy Esparza RN  Outcome: Completed  12/9/2024 1242 by Kristy Esparza RN  Outcome: Adequate for Discharge  Goal: Incision(s), wounds(s) or drain site(s) healing without S/S of infection  Description: INTERVENTIONS  - Assess and document dressing, incision, wound bed, drain sites and surrounding tissue  - Provide patient and family education  12/9/2024 1419 by Kristy Esparza RN  Outcome: Completed  12/9/2024 1242 by Kristy Esparza RN  Outcome: Adequate for Discharge  Goal: Pressure injury heals and does not worsen  Description: Interventions:  - Implement low air loss mattress or specialty surface (Criteria met)  - Apply silicone foam dressing  - Apply fecal or urinary incontinence containment device   - Turn and reposition patient & offload bony prominences every 2 hours   - Utilize  friction reducing device or surface for transfers   - Use incontinent care products after each incontinent episode.  - Consider nutrition services referral as needed  12/9/2024 1419 by Kristy Esparza RN  Outcome: Completed  12/9/2024 1242 by Kristy Esparza RN  Outcome: Adequate for Discharge     Problem: HEMATOLOGIC - ADULT  Goal: Maintains hematologic stability  Description: INTERVENTIONS  - Assess for signs and symptoms of bleeding or hemorrhage  - Monitor labs  - Administer supportive blood products/factors as ordered and appropriate  12/9/2024 1419 by Kristy Esparza RN  Outcome: Completed  12/9/2024 1242 by Kristy Esparza RN  Outcome: Adequate for Discharge     Problem: MUSCULOSKELETAL - ADULT  Goal: Maintain or return mobility to safest level of function  Description: INTERVENTIONS:  - Assess patient's ability to carry out ADLs; assess patient's baseline for ADL function and identify physical deficits which impact ability to perform ADLs (bathing, care of mouth/teeth, toileting, grooming, dressing, etc.)  - Assess/evaluate cause of self-care deficits   - Assess range of motion  - Assess patient's mobility  - Assess patient's need for assistive devices and provide as appropriate  - Encourage maximum independence but intervene and supervise when necessary  - Involve family in performance of ADLs  - Assess for home care needs following discharge   - Consider OT consult to assist with ADL evaluation and planning for discharge  - Provide patient education as appropriate  12/9/2024 1419 by Kristy Esparza RN  Outcome: Completed  12/9/2024 1242 by Kristy Esparza RN  Outcome: Adequate for Discharge  Goal: Maintain proper alignment of affected body part  Description: INTERVENTIONS:  - Support, maintain and protect limb and body alignment  - Provide patient/ family with appropriate education  12/9/2024 1419 by Kristy Esparza RN  Outcome: Completed  12/9/2024 1242 by Kristy Esparza  RN  Outcome: Adequate for Discharge     Problem: Nutrition/Hydration-ADULT  Goal: Nutrient/Hydration intake appropriate for improving, restoring or maintaining nutritional needs  Description: Monitor and assess patient's nutrition/hydration status for malnutrition. Collaborate with interdisciplinary team and initiate plan and interventions as ordered.  Monitor patient's weight and dietary intake as ordered or per policy. Utilize nutrition screening tool and intervene as necessary. Determine patient's food preferences and provide high-protein, high-caloric foods as appropriate.     INTERVENTIONS:  - Monitor oral intake, urinary output, labs, and treatment plans  - Assess nutrition and hydration status and recommend course of action  - Evaluate amount of meals eaten  - Assist patient with eating if necessary   - Allow adequate time for meals  - Recommend/ encourage appropriate diets, oral nutritional supplements, and vitamin/mineral supplements  - Order, calculate, and assess calorie counts as needed  - Assess need for intravenous fluids  - Provide specific nutrition/hydration education as appropriate  - Include patient/family/caregiver in decisions related to nutrition  12/9/2024 1419 by Kristy Esparza RN  Outcome: Completed  12/9/2024 1242 by Kristy Esparza RN  Outcome: Adequate for Discharge     Problem: Prexisting or High Potential for Compromised Skin Integrity  Goal: Skin integrity is maintained or improved  Description: INTERVENTIONS:  - Identify patients at risk for skin breakdown  - Assess and monitor skin integrity  - Assess and monitor nutrition and hydration status  - Monitor labs   - Assess for incontinence   - Turn and reposition patient  - Assist with mobility/ambulation  - Relieve pressure over bony prominences  - Avoid friction and shearing  - Provide appropriate hygiene as needed including keeping skin clean and dry  - Evaluate need for skin moisturizer/barrier cream  - Collaborate with  interdisciplinary team   - Patient/family teaching  - Consider wound care consult   12/9/2024 1419 by Kristy Esparza, RN  Outcome: Completed  12/9/2024 1242 by Kristy Esparza, RN  Outcome: Adequate for Discharge

## 2024-12-09 NOTE — PLAN OF CARE
Problem: OCCUPATIONAL THERAPY ADULT  Goal: Performs self-care activities at highest level of function for planned discharge setting.  See evaluation for individualized goals.  Description: Treatment Interventions: ADL retraining, Functional transfer training, UE strengthening/ROM, Endurance training, Patient/family training, Equipment evaluation/education, Compensatory technique education, Energy conservation, Activityengagement          See flowsheet documentation for full assessment, interventions and recommendations.   Outcome: Progressing  Note: Limitation: Decreased ADL status, Decreased UE strength, Decreased endurance, Decreased self-care trans  Prognosis: Good, Fair  Assessment: Pt seen for Occupational Therapy session with focus on activity tolerance, bed mob, sitting balance and tolerance for pt engagement in UB/LB self-care tasks. Pt cleared by RN/Malou for pt participated in OT session. Pt presented supine/HOB raised pt awake/alert and agreeable to participate in therapy following pt identifiers confirmed.  Pt required assist for  bed mob, and UB/LB self-care tasks 2* deconditioning. Although she was able to tolerate  sitting at edge of pt bed for over 20 mins during AM self-care Pt remains appropriate for II (Moderate Resources)  Pt return to bed post session bed alarm activated and all needs within reach     Rehab Resource Intensity Level, OT: II (Moderate Resource Intensity)

## 2024-12-09 NOTE — CASE MANAGEMENT
Case Management Discharge Planning Note    Patient name Adelina HUNG Polaris  Location [facility] MRN [id number]  : 1960 Date 2024       Current Admission Date: 2024  Current Admission Diagnosis:Seizure (HCC)   Patient Active Problem List    Diagnosis Date Noted Date Diagnosed    Thigh hematoma, left, initial encounter 2024     Bacteremia due to Staphylococcus 2024     Bacteriuria 11/15/2024     Acute on chronic hypoxic respiratory failure (HCC) 11/15/2024     Seizure ([facility]) 2024     Diabetic nephropathy associated with type 2 diabetes mellitus (HCC) 2024     Coagulopathy ([facility]) 2024     Type 2 diabetes mellitus with hyperglycemia, unspecified whether long term insulin use (HCC) 2024     Chronic anticoagulation 2023     Chronic respiratory failure with hypercapnia ([facility]) 2023     CKD (chronic kidney disease) 2023     Anemia in stage 3b chronic kidney disease  (HCC) 2023     Chronic respiratory failure with hypoxia ([facility]) 2023     History of hysterectomy 2022     Panniculitis 2022     Abnormal CT scan 2022     Panniculus 2022     Bilateral pleural effusion 2022     Cardiomegaly 2022     Depression, recurrent (HCC) 2022     Pre-ulcerative corn or callous 2022     Morbid obesity with BMI of 50.0-59.9, adult (HCC) 2021     Secondary hyperparathyroidism of renal origin (HCC) 2021     GERD (gastroesophageal reflux disease) 2020     Anxiety 2020     Chronic constipation 2020     Paroxysmal atrial fibrillation (HCC) 2020     COPD with asthma (HCC) 2020     Hypothyroid 2020     Cellulitis 09/10/2020     H/O right nephrectomy 09/10/2020     Hyperparathyroidism (HCC) 2020     Dyslipidemia 2019     Hypertensive chronic kidney disease with stage 1 through stage 4 chronic kidney disease, or unspecified chronic kidney disease  10/04/2018     Persistent proteinuria 10/04/2018     Iron deficiency 10/04/2018       LOS (days): 27  Geometric Mean LOS (GMLOS) (days): 8.7  Days to GMLOS:-17.9     OBJECTIVE:  Risk of Unplanned Readmission Score: 25.29         Current admission status: Inpatient   Preferred Pharmacy:   [id number] - [place], [place] - [address]  [address] [address]  Phone: [phone] Fax: [phone]    [facility] [place], [place] - [address]  [address]  [address]  [facility] [address]  Phone: [phone] Fax: [phone]    Primary Care Provider: TULIO Beaver    Primary Insurance: [facility]  Secondary Insurance: [facility]    DISCHARGE DETAILS:               Treatment Team Recommendation: Home  Discharge Destination Plan:: Home  Transport at Discharge : [facility]     Number/Name of Dispatcher: [facility]S  Transported by (Company and Unit #): [employer]  ETA of Transport (Date): 12/09/24  ETA of Transport (Time): 1400              IMM Given (Date):: 12/09/24  IMM Given to:: Patient     Additional Comments: Patient for discharge today- [facility] reserved in [facility]. Patient and partner, Constanza, deny any other needs. SO states she is aware of patient's funtional status.

## 2024-12-09 NOTE — PLAN OF CARE
Problem: PAIN - ADULT  Goal: Verbalizes/displays adequate comfort level or baseline comfort level  Description: Interventions:  - Encourage patient to monitor pain and request assistance  - Assess pain using appropriate pain scale  - Administer analgesics based on type and severity of pain and evaluate response  - Implement non-pharmacological measures as appropriate and evaluate response  - Notify physician/advanced practitioner if interventions unsuccessful or patient reports new pain  Outcome: Progressing     Problem: Prexisting or High Potential for Compromised Skin Integrity  Goal: Skin integrity is maintained or improved  Description: INTERVENTIONS:  - Identify patients at risk for skin breakdown  - Assess and monitor skin integrity  - Assess and monitor nutrition and hydration status  - Monitor labs   - Assess for incontinence   - Turn and reposition patient  - Assist with mobility/ambulation  - Relieve pressure over bony prominences  - Avoid friction and shearing  - Provide appropriate hygiene as needed including keeping skin clean and dry  - Evaluate need for skin moisturizer/barrier cream  - Collaborate with interdisciplinary team   - Patient/family teaching  - Consider wound care consult   Outcome: Progressing     Problem: MUSCULOSKELETAL - ADULT  Goal: Maintain or return mobility to safest level of function  Description: INTERVENTIONS:  - Assess patient's ability to carry out ADLs; assess patient's baseline for ADL function and identify physical deficits which impact ability to perform ADLs (bathing, care of mouth/teeth, toileting, grooming, dressing, etc.)  - Assess/evaluate cause of self-care deficits   - Assess range of motion  - Assess patient's mobility  - Assess patient's need for assistive devices and provide as appropriate  - Encourage maximum independence but intervene and supervise when necessary  - Involve family in performance of ADLs  - Assess for home care needs following discharge   -  Consider OT consult to assist with ADL evaluation and planning for discharge  - Provide patient education as appropriate  Outcome: Progressing     Problem: HEMATOLOGIC - ADULT  Goal: Maintains hematologic stability  Description: INTERVENTIONS  - Assess for signs and symptoms of bleeding or hemorrhage  - Monitor labs  - Administer supportive blood products/factors as ordered and appropriate  Outcome: Progressing     Problem: SKIN/TISSUE INTEGRITY - ADULT  Goal: Skin Integrity remains intact(Skin Breakdown Prevention)  Description: Assess:  -Perform Carmelo assessment every shift  -Clean and moisturize skin  -Inspect skin when repositioning, toileting, and assisting with ADLS  -Assess extremities for adequate circulation and sensation     Bed Management:  -Have minimal linens on bed & keep smooth, unwrinkled  -Change linens as needed when moist or perspiring  -Avoid sitting or lying in one position for more than 2 hours while in bed    Toileting:  -Offer bedside commode  -Assess for incontinence    Activity:  -Encourage activity and walks on unit  -Encourage or provide ROM exercises   -Turn and reposition patient every 2 Hours  -Use appropriate equipment to lift or move patient in bed    Skin Care:  -Avoid use of baby powder, tape, friction and shearing, hot water or constrictive clothing  -Relieve pressure over bony prominences  -Do not massage red bony areas    Outcome: Progressing     Problem: NEUROSENSORY - ADULT  Goal: Achieves stable or improved neurological status  Description: INTERVENTIONS  - Monitor and report changes in neurological status  - Monitor vital signs such as temperature, blood pressure, glucose, and any other labs ordered   - Initiate measures to prevent increased intracranial pressure  - Monitor for seizure activity and implement precautions if appropriate      Outcome: Progressing

## 2024-12-10 ENCOUNTER — TELEPHONE (OUTPATIENT)
Dept: FAMILY MEDICINE CLINIC | Facility: CLINIC | Age: 64
End: 2024-12-10

## 2024-12-10 ENCOUNTER — TRANSITIONAL CARE MANAGEMENT (OUTPATIENT)
Dept: FAMILY MEDICINE CLINIC | Facility: CLINIC | Age: 64
End: 2024-12-10

## 2024-12-10 ENCOUNTER — HOME CARE VISIT (OUTPATIENT)
Dept: HOME HEALTH SERVICES | Facility: HOME HEALTHCARE | Age: 64
End: 2024-12-10
Payer: COMMERCIAL

## 2024-12-10 ENCOUNTER — TELEPHONE (OUTPATIENT)
Age: 64
End: 2024-12-10

## 2024-12-10 VITALS
SYSTOLIC BLOOD PRESSURE: 106 MMHG | HEART RATE: 99 BPM | DIASTOLIC BLOOD PRESSURE: 60 MMHG | RESPIRATION RATE: 20 BRPM | TEMPERATURE: 98.1 F | OXYGEN SATURATION: 95 %

## 2024-12-10 PROCEDURE — G0299 HHS/HOSPICE OF RN EA 15 MIN: HCPCS

## 2024-12-10 PROCEDURE — 400013 VN SOC

## 2024-12-10 NOTE — DISCHARGE SUMMARY
Discharge Summary - Hospitalist   Name: Adelina Soto 64 y.o. female I MRN: [id number]  [facility] Date of Admission: 11/12/2024   Date of Service: 12/9/2024 I Hospital Day: 27     Assessment & Plan  Seizure (HCC)  Noted on admission, tonic clonic activity by significant other s/p 6 mg IV versed load.  LP unremarkable. ME panel and HSV negative but treated w acyclovir [id number].   CTH, CTA, and MRI unremarkable. PoA wishes for full treatment medically but was agreeable to DNR, yes to intubate.     Plan:  Continue 750 mg BID keppra  Continue valproate 500 mg q6h    PT OT: recommending postacute rehab. She is a 2-3 person max assist. Spoke at length w patient's caregiver and girlfriend on 12/4 and patient and girlfriend are both in agreement to go home w home PT OT/HH. Patient refuses rehab and caregiver is willing to be there around the clock to provide support. D/c is pending other medical issues - mainly warfarin bridging     Paroxysmal atrial fibrillation (HCC)  Noted on admission, likely due to metabolic stress from seizure, shock/hemodynamic changes. Rate controlled <110 without BB, but has been borderline w rates in 100s more recently.    Recent Labs     12/07/24  0843 12/08/24  0605 12/09/24  0443   INR 3.98* 5.33* 3.30*     Known cost issues w DOACs per girlfriend, so this is not an option    Home regimen is 1 mg MWF, 3 mg Sa/Chappell/T/Th    Started warfarin bridge 12/4 onwards. Need at least 3 days on drip. Target 2.0-3.0  S/p 5 mg, 10 mg, 10 mg of warfarin single doses  Held warfarin on 12/7 and 12/8  OFF of heparin drip  On 12/9, INR down trended to 3.3.    Plan:  Continue 25 mg BID propanolol (given hyperthyroid labs)  Resumed home dosing warfarin on discharge with alternating 1 mg and 3 mg doses  INR checks weekly as an outpatient      Dyslipidemia  Continue atorvastatin 10 mg daily  COPD with asthma (HCC)  Continue xopenex/atrovent   Outpatient PFTs  O2 eval closer to d/c; currently on 2L  n/c. Girlfriend states pt has 2-3 L N/C on baseline w home O2 concentrator  Hypothyroid  At home on levothyroxine 100 mcg po qd  Lab Results   Component Value Date    [id number] 0.295 (L) 11/14/2024    FREET4 1.23 (H) 11/14/2024   Labs show slightly hyperthyroid - may be contributing to afib.    Plan:  Will slowly dose decrease to 88 mcg po qd, follow up outpt labs 4-6 weeks    GERD (gastroesophageal reflux disease)  Continue PPI  Anxiety  Continue zoloft 50 mg daily  Morbid obesity with BMI of 50.0-59.9, adult (HCC)  Complicates all facets of care  Contributing to poor functional status  Should be started on GLP1-ag outpatient - defer to PCP  Depression, recurrent (HCC)  Continue home zoloft  CKD (chronic kidney disease)  Lab Results   Component Value Date    EGFR 44 12/09/2024    EGFR 51 12/07/2024    EGFR 47 12/06/2024    CREATININE 1.26 12/09/2024    CREATININE 1.12 12/07/2024    CREATININE 1.20 12/06/2024     No RADHA  Avoid nephrotoxins  Type 2 diabetes mellitus with hyperglycemia, unspecified whether long term insulin use (HCC)  Lab Results   Component Value Date    HGBA1C 5.7 (H) 11/13/2024     Fasting BG within goal range, no SSI needed    Blood Sugar Average: Last 72 hrs:    Remains well controlled while inpatient  On dental soft/dysphagia 3 diet w/o carb modifier - continue  Bacteriuria  Completed course of zosyn x 3d to cover for urinary infectious source with shock, AMS on presentation   Noted pyruia on admission w (+) Ucx for ESBL Proteus, and E. Coli  No further abx indicated  Acute on chronic hypoxic respiratory failure (HCC)  On 3 L NC at home per girlfriend    Negative PNA workup: CXR, bronch (resp viktoriya mixed) on 11/13. Monitor off abx  Was initially intubated for airway protection from seizure.  Now on 4 L N/C, suspect component of untreated/undiagnosed SASHA due to body habitus, plus COPD as documented elsewhere    TTE 11/14 showed LVEF 60%, moderate concentric hypertrophy, poor windows/view of RV  so difficult to assess RVSP.     Plan:  Outpatient sleep study  Home O2 eval closer to discharge  Monitor off abx  Outpatient pulm f/u for formal PFTs  Resp protocol    Bacteremia due to Staphylococcus  1/2 Bcx (+) staph epi, likely contaminant - monitor off abx  Thigh hematoma, left, initial encounter  Noted 12/2. Started on IV vanc empirically 12/2 for infection. CT consistent w hematoma  Vanc d/c'd on 12/3 by ID.  ID has signed off    Plan:  Monitor off abx for now  Monitor WBC, temps     Medical Problems       Resolved Problems  Date Reviewed: 11/4/2024          Resolved    Abnormal EKG 12/4/2024     Resolved by  Thuy Son MD    Elevated troponin level not due myocardial infarction 12/4/2024     Resolved by  Thuy Son MD    Shock (HCC) 12/4/2024     Resolved by  Thuy Son MD    Hypotension 12/4/2024     Resolved by  Thuy Son MD        Discharging Physician / Practitioner: Thuy Son MD  PCP: TLUIO Beaver  Admission Date:   Admission Orders (From admission, onward)       Ordered        11/12/24 2210  Inpatient Admission  Once                          Discharge Date: 12/09/24    Consultations During Hospital Stay:  Cardiology  Infectious disease  Nutrition    Procedures Performed:   vEEG  Intubation/extubation    Significant Findings / Test Results:   vEEG showed left frontal seizures when admitted to [facility]  12/2/24 CT LLE: High attenuating soft tissue density in the posterior medial distal thigh extending to the level of the knee likely subcutaneous hemorrhage given its density. No well-defined discrete abscess.   MRI brain sz protocol 11/20/24:   No evidence of acute infarct, intracranial hemorrhage or mass.     Incidental Findings:   12/2/24 CT LE, left: Severe tricompartmental osteoarthrosis of the knee including a prominent osseous loose body posteriorly    12/2/24 CT c/a/p:   Large ventral abdominal wall hernia containing nonobstructed  "bowel.   Reviewed in previous weeks prior to discharge    Test Results Pending at Discharge (will require follow up):   None     Outpatient Tests Requested:  Routine INR checks  Outpatient follow-up with neurology, no active plans for repeat head imaging scheduled    Complications: None    Reason for Admission: VEEG, transferred from outside facility for seizure evaluation    Hospital Course:   Adelina Soto is a 64 y.o. female patient who originally presented to the hospital on 11/12/2024 due to seizure-like activity    Hospital summary per Dr. Duff 11/30:  \"Patient is a 64 year old female with history of Afib on Warfarin, HTN, T2DM, morbid obesity, HLD, hypothyroidism, initially presented to [facility] on 11/12 after being found by her partner at home with AMS, seizure-like activity. EMS witnessed tonic-clonic seizure-like activity, treated with 6 mg of Versed. 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 Was subsequently intubated for airway protection and transferred to hospitals. Initial LP generally unremarkable apart from RBC's 300's from otherwise atraumatic tap, negative ME panel and HSV. 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     Transferred to MICU on 11/17, during evaluation pressors were weaned and AEDs adjusted. Underwent CT head which appeared unchanged, LP on 11/19 with fluoro unsuccessful. Off video EEG 11/20, repeat MRI showed no acute findings. Minimal improvements in neurological exam thus far, does open eyes to voice and blink on command. Ongoing discussion regarding goals of care, tracheostomy/feeding tube in LTAC versus comfort care, POA thus far remains treatment oriented. Had family meeting 11/25, declined tracheostomy and also " "would not wish to have her remain in an LTAC / nursing home long term. Additionally discussed 11/26 that she would not like to be resuscitated in the event of a cardiac arrest, code status updated to Level 2 DNR/DNI.     Overall very gradual improvements in neurological exam day-to-day, able to breath spontaneously on the ventilator and follow basic commands. Much more alert today and able to raise her head off the bed, doing well on spontaneous 8/6 with occasional lower volumes. Will consider extubating to BiPAP today.\"  ------------  Patient was ultimately extubated and downgraded out of ICU.  She did not require tracheostomy or PEG tube.  Patient was recommended to go to postacute rehab due to being a 2-3 person max assist with her BMI of 45, weighing 120 kg, but patient expressed that she had a traumatic experience in the rehab facility in the past and did not want to go.  I spoke with the girlfriend/significant other on a near daily basis at bedside throughout the week prior to discharge, and girlfriend was able to do some physical therapy exercises with the patient at the bedside.  They both expressed understanding of the risks, including twice a week visits from nursing when sent home with home health/VNA, and inability to admit someone directly to a rehab facility from the outpatient setting.  I spoke at length with patient's primary care nurse practitioner, Caitie Sam, who stated that outpatient rehab resources may be very strained until after the new year/January 2025.  I also explained this to the family, who maintained their stance on discharge to home with home health.    Patient was also bridged to warfarin at time of discharge due to cost barriers with DOAC's, and was ultimately discharged on her home regimen of alternating 1 mg and 3 mg warfarin doses is documented on her after visit summary.    Was also switched from metoprolol to propranolol for hyperthyroid appearing labs along with " "de-escalation of levothyroxine to 88 from 100 mcg.    Please see above list of diagnoses and related plan for additional information.     Condition at Discharge: stable    Discharge Day Visit / Exam:   Subjective: No complaints.  Denies fevers/chills, chest pain, shortness of breath, heart palpitations, nausea, vomiting, abdominal pain, weakness, or numbness.    Vitals: Blood Pressure: 111/65 (12/09/24 0736)  Pulse: 76 (12/09/24 0803)  Temperature: 97.8 °F (36.6 °C) (12/09/24 0803)  Temp Source: Oral (12/08/24 2217)  Respirations: 18 (12/08/24 2217)  Height: 5' 4\" (162.6 cm) (11/14/24 1609)  Weight - Scale: 120 kg (265 lb 3.4 oz) (12/09/24 0600)  SpO2: 98 % (12/09/24 0803)  Physical Exam  Vitals reviewed.   Constitutional:       General: She is not in acute distress.     Appearance: She is obese. She is not ill-appearing or diaphoretic.   Cardiovascular:      Rate and Rhythm: Normal rate and regular rhythm.      Heart sounds: No murmur heard.     No friction rub. No gallop.   Pulmonary:      Effort: Pulmonary effort is normal. No respiratory distress.      Breath sounds: No wheezing or rales.   Abdominal:      General: There is distension (obese).      Palpations: Abdomen is soft.      Tenderness: There is no abdominal tenderness. There is no guarding.   Musculoskeletal:         General: Normal range of motion.      Right lower leg: No edema.      Left lower leg: No edema.      Comments: Moving all limbs spontaneously. Thigh hematoma w decreased erythema, attenuating size   Skin:     General: Skin is warm and dry.   Neurological:      Mental Status: She is alert and oriented to person, place, and time.   Psychiatric:         Mood and Affect: Mood normal.         Behavior: Behavior normal.          Discussion with Family: Updated  (significant other) at bedside.    Discharge instructions/Information to patient and family:   See after visit summary for information provided to patient and family.  "     Provisions for Follow-Up Care:  See after visit summary for information related to follow-up care and any pertinent home health orders.      Mobility at time of Discharge:   Basic Mobility Inpatient Raw Score: 7  JH-HLM Goal: 2: Bed activities/Dependent transfer  JH-HLM Achieved: 2: Bed activities/Dependent transfer  HLM Goal achieved. Continue to encourage appropriate mobility.     Disposition:   Home with VNA Services (Reminder: Complete face to face encounter)    Planned Readmission: no    Discharge Medications:  See after visit summary for reconciled discharge medications provided to patient and/or family.      Administrative Statements   Discharge Statement:  I have spent a total time of 45 minutes in caring for this patient on the day of the visit/encounter. >30 minutes of time was spent on: Diagnostic results, Prognosis, Risks and benefits of tx options, Instructions for management, Patient and family education, Importance of tx compliance, Risk factor reductions, Impressions, Counseling / Coordination of care, Documenting in the medical record, and Reviewing / ordering tests, medicine, procedures  .    **Please Note: This note may have been constructed using a voice recognition system**    Thuy Son MD  [facility] Internal Medicine ([facility])  [facility]

## 2024-12-10 NOTE — ASSESSMENT & PLAN NOTE
Noted on admission, tonic clonic activity by significant other s/p 6 mg IV versed load.  LP unremarkable. ME panel and HSV negative but treated w acyclovir [id number].   CTH, CTA, and MRI unremarkable. PoA wishes for full treatment medically but was agreeable to DNR, yes to intubate.     Plan:  Continue 750 mg BID keppra  Continue valproate 500 mg q6h    PT OT: recommending postacute rehab. She is a 2-3 person max assist. Spoke at length w patient's caregiver and girlfriend on 12/4 and patient and girlfriend are both in agreement to go home w home PT OT/HH. Patient refuses rehab and caregiver is willing to be there around the clock to provide support. D/c is pending other medical issues - mainly warfarin bridging

## 2024-12-10 NOTE — ASSESSMENT & PLAN NOTE
Noted on admission, likely due to metabolic stress from seizure, shock/hemodynamic changes. Rate controlled <110 without BB, but has been borderline w rates in 100s more recently.    Recent Labs     12/07/24  0843 12/08/24  0605 12/09/24  0443   INR 3.98* 5.33* 3.30*     Known cost issues w DOACs per girlfriend, so this is not an option    Home regimen is 1 mg MWF, 3 mg Sa/Chappell/T/Th    Started warfarin bridge 12/4 onwards. Need at least 3 days on drip. Target 2.0-3.0  S/p 5 mg, 10 mg, 10 mg of warfarin single doses  Held warfarin on 12/7 and 12/8  OFF of heparin drip  On 12/9, INR down trended to 3.3.    Plan:  Continue 25 mg BID propanolol (given hyperthyroid labs)  Resumed home dosing warfarin on discharge with alternating 1 mg and 3 mg doses  INR checks weekly as an outpatient

## 2024-12-10 NOTE — TELEPHONE ENCOUNTER
Pt was discharged from hospital yesterday.  Caregiver is telling VNA pt needs INR home draws weekly and office sets that up with [facility].    Patient will need an INR draw on 12/16/24.

## 2024-12-10 NOTE — ASSESSMENT & PLAN NOTE
Lab Results   Component Value Date    EGFR 44 12/09/2024    EGFR 51 12/07/2024    EGFR 47 12/06/2024    CREATININE 1.26 12/09/2024    CREATININE 1.12 12/07/2024    CREATININE 1.20 12/06/2024     No RADHA  Avoid nephrotoxins

## 2024-12-10 NOTE — ASSESSMENT & PLAN NOTE
At home on levothyroxine 100 mcg po qd  Lab Results   Component Value Date    [id number] 0.295 (L) 11/14/2024    FREET4 1.23 (H) 11/14/2024   Labs show slightly hyperthyroid - may be contributing to afib.    Plan:  Will slowly dose decrease to 88 mcg po qd, follow up outpt labs 4-6 weeks

## 2024-12-10 NOTE — TELEPHONE ENCOUNTER
Spoke with Constanza patients caregiver to complete TCM call. Constanza said that patient is doing well at home at this time. States that she will not be able to bring patient to office until patient starts PT and is able to get up in her chair. Constanza said she will call and schedule follow up visit at that time.

## 2024-12-11 ENCOUNTER — TELEPHONE (OUTPATIENT)
Dept: FAMILY MEDICINE CLINIC | Facility: CLINIC | Age: 64
End: 2024-12-11

## 2024-12-12 ENCOUNTER — HOME CARE VISIT (OUTPATIENT)
Dept: HOME HEALTH SERVICES | Facility: HOME HEALTHCARE | Age: 64
End: 2024-12-12
Payer: COMMERCIAL

## 2024-12-12 VITALS
OXYGEN SATURATION: 95 % | TEMPERATURE: 97.6 F | HEART RATE: 76 BPM | DIASTOLIC BLOOD PRESSURE: 60 MMHG | RESPIRATION RATE: 18 BRPM | SYSTOLIC BLOOD PRESSURE: 98 MMHG

## 2024-12-12 PROCEDURE — G0300 HHS/HOSPICE OF LPN EA 15 MIN: HCPCS

## 2024-12-12 NOTE — CASE COMMUNICATION
Could you please order Mariam a new glucometer? She uses [facility] on [address] [facility].

## 2024-12-13 ENCOUNTER — HOME CARE VISIT (OUTPATIENT)
Dept: HOME HEALTH SERVICES | Facility: HOME HEALTHCARE | Age: 64
End: 2024-12-13
Payer: COMMERCIAL

## 2024-12-13 DIAGNOSIS — Z79.4 TYPE 2 DIABETES MELLITUS WITHOUT COMPLICATION, WITH LONG-TERM CURRENT USE OF INSULIN (HCC): Primary | ICD-10-CM

## 2024-12-13 DIAGNOSIS — E11.9 TYPE 2 DIABETES MELLITUS WITHOUT COMPLICATION, WITH LONG-TERM CURRENT USE OF INSULIN (HCC): Primary | ICD-10-CM

## 2024-12-13 PROCEDURE — G0152 HHCP-SERV OF OT,EA 15 MIN: HCPCS

## 2024-12-13 RX ORDER — BLOOD SUGAR DIAGNOSTIC
STRIP MISCELLANEOUS
Qty: 100 EACH | Refills: 3 | Status: SHIPPED | OUTPATIENT
Start: 2024-12-13 | End: 2024-12-16

## 2024-12-13 RX ORDER — BLOOD-GLUCOSE METER
KIT MISCELLANEOUS
Qty: 1 KIT | Refills: 0 | Status: SHIPPED | OUTPATIENT
Start: 2024-12-13 | End: 2024-12-16

## 2024-12-13 RX ORDER — LANCETS 33 GAUGE
EACH MISCELLANEOUS
Qty: 100 EACH | Refills: 3 | Status: SHIPPED | OUTPATIENT
Start: 2024-12-13

## 2024-12-14 VITALS — OXYGEN SATURATION: 99 % | HEART RATE: 77 BPM | DIASTOLIC BLOOD PRESSURE: 58 MMHG | SYSTOLIC BLOOD PRESSURE: 112 MMHG

## 2024-12-16 ENCOUNTER — HOME CARE VISIT (OUTPATIENT)
Dept: HOME HEALTH SERVICES | Facility: HOME HEALTHCARE | Age: 64
End: 2024-12-16
Payer: COMMERCIAL

## 2024-12-16 ENCOUNTER — TELEPHONE (OUTPATIENT)
Age: 64
End: 2024-12-16

## 2024-12-16 ENCOUNTER — TELEPHONE (OUTPATIENT)
Dept: HOME HEALTH SERVICES | Facility: HOME HEALTHCARE | Age: 64
End: 2024-12-16

## 2024-12-16 VITALS
HEART RATE: 79 BPM | TEMPERATURE: 97 F | SYSTOLIC BLOOD PRESSURE: 104 MMHG | DIASTOLIC BLOOD PRESSURE: 60 MMHG | OXYGEN SATURATION: 94 %

## 2024-12-16 DIAGNOSIS — E11.9 TYPE 2 DIABETES MELLITUS WITHOUT COMPLICATION, WITH LONG-TERM CURRENT USE OF INSULIN (HCC): Primary | ICD-10-CM

## 2024-12-16 DIAGNOSIS — Z79.4 TYPE 2 DIABETES MELLITUS WITHOUT COMPLICATION, WITH LONG-TERM CURRENT USE OF INSULIN (HCC): Primary | ICD-10-CM

## 2024-12-16 PROCEDURE — G0299 HHS/HOSPICE OF RN EA 15 MIN: HCPCS

## 2024-12-16 RX ORDER — BLOOD-GLUCOSE METER
EACH MISCELLANEOUS
Qty: 1 KIT | Refills: 0 | Status: SHIPPED | OUTPATIENT
Start: 2024-12-16

## 2024-12-16 RX ORDER — BLOOD SUGAR DIAGNOSTIC
1 STRIP MISCELLANEOUS
Qty: 100 STRIP | Refills: 5 | Status: SHIPPED | OUTPATIENT
Start: 2024-12-16

## 2024-12-16 NOTE — TELEPHONE ENCOUNTER
Lise Ford  with [employer] insurance called to find out if we did telehealth appts because pt is currently no able to come into the office. Also, she will need a new glucometer- Accucheck Guide will be covered by her insurance. Lastly, Pt has a hospital bed that is broken. Her five years is up in March. A new bed can be ordered but the pcp would have to send documentation that the current bed is broken beyond repair. Pt will be calling to set up an appt to discuss these things. FYI.

## 2024-12-17 ENCOUNTER — RESULTS FOLLOW-UP (OUTPATIENT)
Dept: FAMILY MEDICINE CLINIC | Facility: CLINIC | Age: 64
End: 2024-12-17

## 2024-12-17 NOTE — TELEPHONE ENCOUNTER
Please see the note from case management. Televisit must be video for visit to be covered. It must be a long appointment-20 min not sufficient time-we offered last week-caregiver declined-I may have less availability right now. I did send rx for glucometer   Detail Level: Simple Detail Level: Zone Detail Level: Generalized Detail Level: Detailed

## 2024-12-18 ENCOUNTER — TELEPHONE (OUTPATIENT)
Dept: FAMILY MEDICINE CLINIC | Facility: CLINIC | Age: 64
End: 2024-12-18

## 2024-12-18 ENCOUNTER — HOME CARE VISIT (OUTPATIENT)
Dept: HOME HEALTH SERVICES | Facility: HOME HEALTHCARE | Age: 64
End: 2024-12-18
Payer: COMMERCIAL

## 2024-12-18 ENCOUNTER — ANTICOAG VISIT (OUTPATIENT)
Dept: FAMILY MEDICINE CLINIC | Facility: CLINIC | Age: 64
End: 2024-12-18

## 2024-12-18 VITALS
HEART RATE: 68 BPM | OXYGEN SATURATION: 97 % | RESPIRATION RATE: 20 BRPM | DIASTOLIC BLOOD PRESSURE: 58 MMHG | SYSTOLIC BLOOD PRESSURE: 102 MMHG

## 2024-12-18 PROCEDURE — G0155 HHCP-SVS OF CSW,EA 15 MIN: HCPCS

## 2024-12-18 PROCEDURE — G0151 HHCP-SERV OF PT,EA 15 MIN: HCPCS

## 2024-12-18 NOTE — RESULT ENCOUNTER NOTE
Called and spoke marlee Apodaca -- patient is currently taking coumadin 1 mg Mo, Wed, Fri, and 3 mg rest of days. Informed INR subtherapeutic. Pt was advised and recorded new dosing instructions per PCP note: 1 mg Mo, Fri, 3 mg rest of days. Constanza who manages meds was agreeable to change and acknowledged. INR in 1 wks time as planned --standing order.

## 2024-12-18 NOTE — TELEPHONE ENCOUNTER
Anabel w/ [facility] visiting nurse called office re patient video visit and broken bed. Aware of previous messages in chart. At this point patient and SW were made aware TCM is out of question as this was offered and declined as documented. Caretaker constanza is agreeable to [facility] as telehealth visit and I informed them that I would contact them after discussing further with provider as to where patient will need to be scheduled.     Discussed w provider -- called pt caretaker Constanza at this time to schedule pt for soonest available as [facility] virtual visit. Left voicemail advising call back. Pt MUST be able to do VIDEO VISIT in order for bed to be covered. Will await call back for scheduling.

## 2024-12-19 ENCOUNTER — HOME CARE VISIT (OUTPATIENT)
Dept: HOME HEALTH SERVICES | Facility: HOME HEALTHCARE | Age: 64
End: 2024-12-19
Payer: COMMERCIAL

## 2024-12-19 VITALS — SYSTOLIC BLOOD PRESSURE: 112 MMHG | DIASTOLIC BLOOD PRESSURE: 60 MMHG | HEART RATE: 74 BPM | OXYGEN SATURATION: 98 %

## 2024-12-19 VITALS
RESPIRATION RATE: 18 BRPM | SYSTOLIC BLOOD PRESSURE: 112 MMHG | HEART RATE: 70 BPM | TEMPERATURE: 97.8 F | OXYGEN SATURATION: 95 % | DIASTOLIC BLOOD PRESSURE: 62 MMHG

## 2024-12-19 PROCEDURE — G0152 HHCP-SERV OF OT,EA 15 MIN: HCPCS

## 2024-12-19 PROCEDURE — G0299 HHS/HOSPICE OF RN EA 15 MIN: HCPCS

## 2024-12-19 NOTE — TELEPHONE ENCOUNTER
Please move this appointment to a 30 min slot. 15 min is insufficient time for all that needs to be covered. Thanks.

## 2024-12-23 ENCOUNTER — HOME CARE VISIT (OUTPATIENT)
Dept: HOME HEALTH SERVICES | Facility: HOME HEALTHCARE | Age: 64
End: 2024-12-23
Payer: COMMERCIAL

## 2024-12-23 VITALS
DIASTOLIC BLOOD PRESSURE: 64 MMHG | TEMPERATURE: 97.8 F | HEART RATE: 72 BPM | RESPIRATION RATE: 17 BRPM | SYSTOLIC BLOOD PRESSURE: 138 MMHG | OXYGEN SATURATION: 98 %

## 2024-12-23 PROCEDURE — G0300 HHS/HOSPICE OF LPN EA 15 MIN: HCPCS

## 2024-12-24 ENCOUNTER — HOME CARE VISIT (OUTPATIENT)
Dept: HOME HEALTH SERVICES | Facility: HOME HEALTHCARE | Age: 64
End: 2024-12-24
Payer: COMMERCIAL

## 2024-12-24 VITALS — SYSTOLIC BLOOD PRESSURE: 108 MMHG | HEART RATE: 76 BPM | DIASTOLIC BLOOD PRESSURE: 64 MMHG | OXYGEN SATURATION: 98 %

## 2024-12-24 PROCEDURE — G0151 HHCP-SERV OF PT,EA 15 MIN: HCPCS

## 2024-12-26 ENCOUNTER — HOME CARE VISIT (OUTPATIENT)
Dept: HOME HEALTH SERVICES | Facility: HOME HEALTHCARE | Age: 64
End: 2024-12-26
Payer: COMMERCIAL

## 2024-12-26 VITALS
HEART RATE: 76 BPM | DIASTOLIC BLOOD PRESSURE: 64 MMHG | OXYGEN SATURATION: 97 % | TEMPERATURE: 97.9 F | RESPIRATION RATE: 18 BRPM | SYSTOLIC BLOOD PRESSURE: 102 MMHG

## 2024-12-26 PROCEDURE — G0300 HHS/HOSPICE OF LPN EA 15 MIN: HCPCS

## 2024-12-26 NOTE — CASE COMMUNICATION
Can you please check on the order that was placed on 12/10. Patient states she has not received them.

## 2024-12-27 ENCOUNTER — TELEPHONE (OUTPATIENT)
Age: 64
End: 2024-12-27

## 2024-12-27 ENCOUNTER — HOME CARE VISIT (OUTPATIENT)
Dept: HOME HEALTH SERVICES | Facility: HOME HEALTHCARE | Age: 64
End: 2024-12-27
Payer: COMMERCIAL

## 2024-12-27 VITALS — SYSTOLIC BLOOD PRESSURE: 112 MMHG | DIASTOLIC BLOOD PRESSURE: 64 MMHG | OXYGEN SATURATION: 95 % | HEART RATE: 66 BPM

## 2024-12-27 LAB
DME PARACHUTE DELIVERY DATE ACTUAL: NORMAL
DME PARACHUTE DELIVERY DATE REQUESTED: NORMAL
DME PARACHUTE ITEM DESCRIPTION: NORMAL
DME PARACHUTE ORDER STATUS: NORMAL
DME PARACHUTE SUPPLIER NAME: NORMAL
DME PARACHUTE SUPPLIER PHONE: NORMAL

## 2024-12-27 PROCEDURE — G0152 HHCP-SERV OF OT,EA 15 MIN: HCPCS

## 2024-12-27 NOTE — TELEPHONE ENCOUNTER
[facility] requires a weekly INR House Form faxed to them. They did not receive the form this week.    Fax [phone]

## 2024-12-27 NOTE — TELEPHONE ENCOUNTER
Gaby, from [facility], called back and stated she received the order but will also need the house call form completed and faxed.  They cannot kendra the form as recurrent as the patient is a home draw, so she will need a new form completed every week that she's due. She will fax a black form to the office just in case.  Please fax the form to [phone].  Thank you!    rolling walker

## 2024-12-30 ENCOUNTER — TELEPHONE (OUTPATIENT)
Dept: FAMILY MEDICINE CLINIC | Facility: CLINIC | Age: 64
End: 2024-12-30

## 2024-12-30 DIAGNOSIS — E03.4 HYPOTHYROIDISM DUE TO ACQUIRED ATROPHY OF THYROID: Chronic | ICD-10-CM

## 2024-12-30 DIAGNOSIS — I51.7 CARDIOMEGALY: ICD-10-CM

## 2024-12-30 RX ORDER — PROPRANOLOL HCL 20 MG
20 TABLET ORAL EVERY 12 HOURS SCHEDULED
Qty: 60 TABLET | Refills: 4 | Status: SHIPPED | OUTPATIENT
Start: 2024-12-30

## 2024-12-30 RX ORDER — PROPRANOLOL HYDROCHLORIDE 20 MG/5ML
20 SOLUTION ORAL EVERY 12 HOURS SCHEDULED
Qty: 300 ML | Refills: 0 | Status: CANCELLED | OUTPATIENT
Start: 2024-12-30

## 2024-12-30 NOTE — TELEPHONE ENCOUNTER
Called and spoke w pt caregiver -- pt was advised to maintain propanolol prescription. They would prefer tablet/capsule form of medication. Verified pharmacy as [address] [place] [address]

## 2024-12-30 NOTE — TELEPHONE ENCOUNTER
Pts caregiver wondering if pt is still to be taking   propranolol (INDERAL) 20 mg/5 mL solution 20 mg, Oral, Every 12 hours scheduled   Please advise if so thye are in need of a refill to the [facility] on chart

## 2024-12-30 NOTE — TELEPHONE ENCOUNTER
Gaby, from [facility], called back and stated that she did not receive the house call form completed and faxed.  They cannot kendra the form as recurrent as the patient is a home draw, so she will need a new form completed every week that she's due. Gaby would like form filled out and faxed back, Gaby did see patient today but will need form filled out.  Please fax the form to [phone].  Thank you!

## 2024-12-31 ENCOUNTER — HOME CARE VISIT (OUTPATIENT)
Dept: HOME HEALTH SERVICES | Facility: HOME HEALTHCARE | Age: 64
End: 2024-12-31
Payer: COMMERCIAL

## 2024-12-31 ENCOUNTER — TELEPHONE (OUTPATIENT)
Age: 64
End: 2024-12-31

## 2024-12-31 ENCOUNTER — ANTICOAG VISIT (OUTPATIENT)
Dept: FAMILY MEDICINE CLINIC | Facility: CLINIC | Age: 64
End: 2024-12-31

## 2024-12-31 VITALS — SYSTOLIC BLOOD PRESSURE: 108 MMHG | DIASTOLIC BLOOD PRESSURE: 66 MMHG | OXYGEN SATURATION: 97 % | HEART RATE: 101 BPM

## 2024-12-31 VITALS — HEART RATE: 75 BPM | DIASTOLIC BLOOD PRESSURE: 80 MMHG | OXYGEN SATURATION: 98 % | SYSTOLIC BLOOD PRESSURE: 130 MMHG

## 2024-12-31 PROCEDURE — G0151 HHCP-SERV OF PT,EA 15 MIN: HCPCS

## 2024-12-31 PROCEDURE — G0152 HHCP-SERV OF OT,EA 15 MIN: HCPCS

## 2024-12-31 RX ORDER — METOPROLOL TARTRATE 50 MG
1 TABLET ORAL 2 TIMES DAILY
COMMUNITY
Start: 2024-12-21

## 2024-12-31 NOTE — TELEPHONE ENCOUNTER
Ginette from [facility] calling asking if dana would be apposed to ordering a bronson lift and sling. Please check case communication from Ginette please put that patient is bed bound and medically necessary, please add height of 5'3 and weight 264lbs.     Please fax order to [phone] Asuncion Elmore

## 2024-12-31 NOTE — PROGRESS NOTES
Please advise INR 1.8 Increase coumadin to 3mg on all days except 1 mg on Monday and Friday Repeat INR on 1/8. I will fax [facility] slip now      Called and spoke w pt caretaker -- informed of PCP note. Pt caretaker was agreeable, wrote down instructions, and will call if there are any questions or concerns.

## 2025-01-02 ENCOUNTER — TELEMEDICINE (OUTPATIENT)
Dept: FAMILY MEDICINE CLINIC | Facility: CLINIC | Age: 65
End: 2025-01-02
Payer: COMMERCIAL

## 2025-01-02 DIAGNOSIS — I48.0 PAROXYSMAL ATRIAL FIBRILLATION (HCC): Chronic | ICD-10-CM

## 2025-01-02 DIAGNOSIS — Z74.01 BEDBOUND: ICD-10-CM

## 2025-01-02 DIAGNOSIS — E11.65 TYPE 2 DIABETES MELLITUS WITH HYPERGLYCEMIA, UNSPECIFIED WHETHER LONG TERM INSULIN USE (HCC): ICD-10-CM

## 2025-01-02 DIAGNOSIS — D63.1 ANEMIA IN STAGE 3B CHRONIC KIDNEY DISEASE  (HCC): ICD-10-CM

## 2025-01-02 DIAGNOSIS — N18.32 ANEMIA IN STAGE 3B CHRONIC KIDNEY DISEASE  (HCC): ICD-10-CM

## 2025-01-02 DIAGNOSIS — E03.9 ACQUIRED HYPOTHYROIDISM: Chronic | ICD-10-CM

## 2025-01-02 DIAGNOSIS — R56.9 SEIZURE (HCC): ICD-10-CM

## 2025-01-02 DIAGNOSIS — Z09 HOSPITAL DISCHARGE FOLLOW-UP: Primary | ICD-10-CM

## 2025-01-02 DIAGNOSIS — J96.12 CHRONIC RESPIRATORY FAILURE WITH HYPERCAPNIA (HCC): ICD-10-CM

## 2025-01-02 LAB

## 2025-01-02 PROCEDURE — 99214 OFFICE O/P EST MOD 30 MIN: CPT | Performed by: NURSE PRACTITIONER

## 2025-01-02 PROCEDURE — G2211 COMPLEX E/M VISIT ADD ON: HCPCS | Performed by: NURSE PRACTITIONER

## 2025-01-02 NOTE — ASSESSMENT & PLAN NOTE
Lab Results   Component Value Date    EGFR 44 12/09/2024    EGFR 51 12/07/2024    EGFR 47 12/06/2024    CREATININE 1.26 12/09/2024    CREATININE 1.12 12/07/2024    CREATININE 1.20 12/06/2024       Orders:    Comprehensive metabolic panel    CBC and differential; Future

## 2025-01-02 NOTE — PROGRESS NOTES
Virtual Regular Visit  Name: Adelina Soto      : 1960      MRN: [id number]  Encounter Provider: TULIO Baever  Encounter Date: 2025   Encounter department: [facility]      Verification of patient location:  Patient is located at Home in the following state in which I hold an active license [place] :  Assessment & Plan  Hospital discharge follow-up  Prolonged admission SLB - with intract seizures, respiratory failure  S/p intubation  She was taken home by caregiver with services  She is receiving physical therapy twice weekly and visiting nurses once weekly         Paroxysmal atrial fibrillation (HCC)  Anticoagulated with warfarin       Chronic respiratory failure with hypercapnia (HCC)  Stabilized  On home O2-3 L/min when active; 2 L/min when resting       Seizure (HCC)  Unclear etiology  Compliant with antiseizure medication-due for serum surveillance  Denies seizures since home    Orders:  •  Levetiracetam level; Future  •  Valproic acid level, total; Future  •  Comprehensive metabolic panel    Type 2 diabetes mellitus with hyperglycemia, unspecified whether long term insulin use ([facility])    Lab Results   Component Value Date    HGBA1C 5.7 (H) 2024            Bedbound  Strength improving with physical therapy    Hospital bed is 5 years old-it is broken.  Head of bed does not adjust.  It is held up by a strap.  It should be replaced.  Will request a new replacement  Hospital bed is medically necessary to support her respiratory status, assisted in repositioning to prevent further breakdown.       Anemia in stage 3b chronic kidney disease  ([facility])  Lab Results   Component Value Date    EGFR 44 2024    EGFR 51 2024    EGFR 47 2024    CREATININE 1.26 2024    CREATININE 1.12 2024    CREATININE 1.20 2024       Orders:  •  Comprehensive metabolic panel  •  CBC and differential; Future    Acquired hypothyroidism  Recent start  levothyroxine  Check levels  Orders:  •  TSH, 3rd generation with Free T4 reflex    Lila is clinically stable  She is improving day by day per her caregivers report  Will continue to monitor her closely until she is able to come to my office for an office evaluation  Will arrange for in-home blood draw  I will order equipment needed-hospital bed and Aaron lift  Medication refills approved  Constanza will keep me updated on her wound healing, and her progress  Continue home health services    Encounter provider TULIO Beaver    The patient was identified by name and date of birth. Adelina Soto was informed that this is a telemedicine visit and that the visit is being conducted through the [email or web address] platform. She agrees to proceed..  My office door was closed. No one else was in the room.  She acknowledged consent and understanding of privacy and security of the video platform. The patient has agreed to participate and understands they can discontinue the visit at any time.    Patient's caregiver and significant other, Constanza, facilitated the visit    Patient is aware this is a billable service.     History of Present Illness     64-year-old female well-known to me seen by virtual visit for hospital follow-up  Unfortunately on November 12 she experienced a sudden seizure while at home with her partner Constanza  She had respiratory distress as well  EMS summoned to the home.  Lila taken to [facility] for further evaluation  She was intubated and admitted to ICU for and lengthy admission  She had what seemed to be intractable seizures of unclear etiology  She had lumbar puncture and culturing  It was felt that HSV was possible because but culturing negative  She was started on valproic acid and Keppra which she continues to be on maintenance  Since home she has not had any seizures  Unfortunately due to her condition she suffered skin breakdown sacrum and thigh.  These wounds are healing.  Visiting  nurse comes weekly for care.  Constanza provides wound care in between  She is receiving daily physical therapy by home health or by her caregiver  In the hospital she had breakthrough A-fib, new hypothyroid.  Treatment plan adjusted accordingly    Today she is in good spirits  She is cooperative with treatment plan at home as she wants to get better and get back to her routine  Appetite is good    She is in need of a new hospital bed  Her current 1 is 5 years old.  Head of the bed no longer elevates.  It is being held up by a strap which is visible during her visit.  I will send order to [facility] for new bed  We discussed all issues, concerns, questions, needs during this visit    History obtained from spouse, chart review, and the patient   Review of Systems   Constitutional:  Positive for fatigue. Negative for fever.   HENT:  Negative for trouble swallowing.    Respiratory:  Negative for cough and shortness of breath.    Cardiovascular:  Positive for leg swelling. Negative for chest pain and palpitations.   Gastrointestinal:  Negative for abdominal pain and blood in stool.   Genitourinary:  Negative for hematuria.   Skin:  Positive for wound.   Neurological:  Positive for weakness. Negative for dizziness.   Psychiatric/Behavioral:  Positive for confusion (improving daily\). Negative for sleep disturbance.        Objective   There were no vitals taken for this visit.    Physical Exam  Vitals reviewed.   Constitutional:       General: She is not in acute distress.     Appearance: Normal appearance.      Comments: Lila is resting comfortably in bed  She does not appear to be in any distress  She does show me that the hospital bed is broken and the head of the bed is being held up by a strap.  She is in need of a new hospital bed   Pulmonary:      Effort: No respiratory distress.   Skin:            Comments: I appreciate mild edema of legs     Neurological:      Mental Status: She is alert.       Visit Time  Total Visit  Duration: 35

## 2025-01-02 NOTE — ASSESSMENT & PLAN NOTE
Unclear etiology  Compliant with antiseizure medication-due for serum surveillance  Denies seizures since home    Orders:    Levetiracetam level; Future    Valproic acid level, total; Future    Comprehensive metabolic panel

## 2025-01-03 ENCOUNTER — HOME CARE VISIT (OUTPATIENT)
Dept: HOME HEALTH SERVICES | Facility: HOME HEALTHCARE | Age: 65
End: 2025-01-03
Payer: COMMERCIAL

## 2025-01-03 VITALS — SYSTOLIC BLOOD PRESSURE: 108 MMHG | HEART RATE: 75 BPM | OXYGEN SATURATION: 96 % | DIASTOLIC BLOOD PRESSURE: 66 MMHG

## 2025-01-03 VITALS
TEMPERATURE: 97.3 F | HEART RATE: 74 BPM | DIASTOLIC BLOOD PRESSURE: 68 MMHG | SYSTOLIC BLOOD PRESSURE: 134 MMHG | OXYGEN SATURATION: 97 % | RESPIRATION RATE: 20 BRPM

## 2025-01-03 PROCEDURE — G0299 HHS/HOSPICE OF RN EA 15 MIN: HCPCS

## 2025-01-06 DIAGNOSIS — E03.4 HYPOTHYROIDISM DUE TO ACQUIRED ATROPHY OF THYROID: Chronic | ICD-10-CM

## 2025-01-06 DIAGNOSIS — R56.9 SEIZURE (HCC): ICD-10-CM

## 2025-01-06 NOTE — TELEPHONE ENCOUNTER
Last refill,discharge from [facility] would like PCP to refill       Reason for call:   [x] Refill   [] Prior Auth  [] Other:     Office:   [x] PCP/Provider -Caitie Alarcon   [] Specialty/Provider -     Medication: Levetiracetam  Dose/Frequency: 750 mg Q 12 HRS  Quantity: 60    Medication: Levothyroxine  Dose/Frequency: 88 mcg Daily   Quantity: 30    Medication: Valproic Acid soln  Dose/Frequency: 250 mg/5 ml 500 mg (10 ml)Q 6 HRS   Quantity: 1200 ml    Pharmacy: [facility]     Does the patient have enough for 3 days?   [x] Yes   [] No - Send as HP to POD

## 2025-01-07 ENCOUNTER — RESULTS FOLLOW-UP (OUTPATIENT)
Dept: FAMILY MEDICINE CLINIC | Facility: CLINIC | Age: 65
End: 2025-01-07

## 2025-01-07 ENCOUNTER — HOME CARE VISIT (OUTPATIENT)
Dept: HOME HEALTH SERVICES | Facility: HOME HEALTHCARE | Age: 65
End: 2025-01-07
Payer: COMMERCIAL

## 2025-01-07 LAB
INR PPP: 1.3
PROTHROMBIN TIME: 14 SEC (ref 9–11.5)

## 2025-01-07 PROCEDURE — G0151 HHCP-SERV OF PT,EA 15 MIN: HCPCS

## 2025-01-07 RX ORDER — LEVOTHYROXINE SODIUM 88 UG/1
88 TABLET ORAL
Qty: 30 TABLET | Refills: 0 | Status: SHIPPED | OUTPATIENT
Start: 2025-01-07

## 2025-01-07 RX ORDER — VALPROIC ACID 250 MG/5ML
500 SOLUTION ORAL EVERY 6 HOURS SCHEDULED
Qty: 1200 ML | Refills: 0 | Status: SHIPPED | OUTPATIENT
Start: 2025-01-07

## 2025-01-07 RX ORDER — LEVETIRACETAM 750 MG/1
750 TABLET ORAL EVERY 12 HOURS SCHEDULED
Qty: 60 TABLET | Refills: 0 | Status: SHIPPED | OUTPATIENT
Start: 2025-01-07

## 2025-01-07 NOTE — RESULT ENCOUNTER NOTE
Called and spoke w pt caretaker Constanza -- she reports that on Sunday, Tues, Wed, Thurs, Sat Adelina is taking 3 mg total. On Monday and Friday Adelina is taking 1 mg total. She reports the patient has not missed any doses. Please advise re dosing.

## 2025-01-08 ENCOUNTER — ANTICOAG VISIT (OUTPATIENT)
Dept: FAMILY MEDICINE CLINIC | Facility: CLINIC | Age: 65
End: 2025-01-08

## 2025-01-08 ENCOUNTER — HOME CARE VISIT (OUTPATIENT)
Dept: HOME HEALTH SERVICES | Facility: HOME HEALTHCARE | Age: 65
End: 2025-01-08
Payer: COMMERCIAL

## 2025-01-08 VITALS
OXYGEN SATURATION: 98 % | TEMPERATURE: 97.7 F | SYSTOLIC BLOOD PRESSURE: 130 MMHG | HEART RATE: 67 BPM | RESPIRATION RATE: 18 BRPM | DIASTOLIC BLOOD PRESSURE: 68 MMHG

## 2025-01-08 PROCEDURE — G0299 HHS/HOSPICE OF RN EA 15 MIN: HCPCS

## 2025-01-08 NOTE — RESULT ENCOUNTER NOTE
Called and spoke w pt caretaker Constanza -- she reports that pt has not been consuming any leafy greens or protein shakes as of late -- she was advised to take 3 mg OD at the same time each day and to repeat the INR in 1 wks time. She was agreeable and will do so.

## 2025-01-09 ENCOUNTER — HOME CARE VISIT (OUTPATIENT)
Dept: HOME HEALTH SERVICES | Facility: HOME HEALTHCARE | Age: 65
End: 2025-01-09
Payer: COMMERCIAL

## 2025-01-09 VITALS — OXYGEN SATURATION: 96 % | HEART RATE: 80 BPM

## 2025-01-09 LAB
ALBUMIN/CREAT UR: 21 MG/G CREAT
CREAT UR-MCNC: 68 MG/DL (ref 20–275)
MICROALBUMIN UR-MCNC: 1.4 MG/DL

## 2025-01-09 PROCEDURE — G0152 HHCP-SERV OF OT,EA 15 MIN: HCPCS

## 2025-01-10 ENCOUNTER — HOME CARE VISIT (OUTPATIENT)
Dept: HOME HEALTH SERVICES | Facility: HOME HEALTHCARE | Age: 65
End: 2025-01-10
Payer: COMMERCIAL

## 2025-01-10 VITALS — SYSTOLIC BLOOD PRESSURE: 122 MMHG | DIASTOLIC BLOOD PRESSURE: 64 MMHG | OXYGEN SATURATION: 87 % | HEART RATE: 76 BPM

## 2025-01-10 PROCEDURE — G0151 HHCP-SERV OF PT,EA 15 MIN: HCPCS

## 2025-01-12 VITALS — OXYGEN SATURATION: 96 % | HEART RATE: 77 BPM | DIASTOLIC BLOOD PRESSURE: 78 MMHG | SYSTOLIC BLOOD PRESSURE: 122 MMHG

## 2025-01-14 ENCOUNTER — HOME CARE VISIT (OUTPATIENT)
Dept: HOME HEALTH SERVICES | Facility: HOME HEALTHCARE | Age: 65
End: 2025-01-14
Payer: COMMERCIAL

## 2025-01-14 VITALS — SYSTOLIC BLOOD PRESSURE: 112 MMHG | OXYGEN SATURATION: 98 % | DIASTOLIC BLOOD PRESSURE: 70 MMHG | HEART RATE: 88 BPM

## 2025-01-14 PROCEDURE — G0152 HHCP-SERV OF OT,EA 15 MIN: HCPCS

## 2025-01-15 ENCOUNTER — HOME CARE VISIT (OUTPATIENT)
Dept: HOME HEALTH SERVICES | Facility: HOME HEALTHCARE | Age: 65
End: 2025-01-15
Payer: COMMERCIAL

## 2025-01-15 VITALS
DIASTOLIC BLOOD PRESSURE: 78 MMHG | TEMPERATURE: 97 F | SYSTOLIC BLOOD PRESSURE: 121 MMHG | RESPIRATION RATE: 18 BRPM | HEART RATE: 84 BPM | OXYGEN SATURATION: 94 %

## 2025-01-15 LAB

## 2025-01-15 PROCEDURE — G0151 HHCP-SERV OF PT,EA 15 MIN: HCPCS

## 2025-01-15 PROCEDURE — G0299 HHS/HOSPICE OF RN EA 15 MIN: HCPCS

## 2025-01-16 ENCOUNTER — RESULTS FOLLOW-UP (OUTPATIENT)
Dept: FAMILY MEDICINE CLINIC | Facility: CLINIC | Age: 65
End: 2025-01-16

## 2025-01-16 VITALS — SYSTOLIC BLOOD PRESSURE: 112 MMHG | DIASTOLIC BLOOD PRESSURE: 68 MMHG

## 2025-01-16 LAB
INR PPP: 1.6
PROTHROMBIN TIME: 16.1 SEC (ref 9–11.5)

## 2025-01-17 ENCOUNTER — HOME CARE VISIT (OUTPATIENT)
Dept: HOME HEALTH SERVICES | Facility: HOME HEALTHCARE | Age: 65
End: 2025-01-17
Payer: COMMERCIAL

## 2025-01-17 ENCOUNTER — ANTICOAG VISIT (OUTPATIENT)
Dept: FAMILY MEDICINE CLINIC | Facility: CLINIC | Age: 65
End: 2025-01-17

## 2025-01-17 VITALS — SYSTOLIC BLOOD PRESSURE: 118 MMHG | DIASTOLIC BLOOD PRESSURE: 67 MMHG | HEART RATE: 79 BPM | OXYGEN SATURATION: 97 %

## 2025-01-17 LAB — INR PPP: 1.6 (ref 0.85–1.19)

## 2025-01-17 PROCEDURE — G0151 HHCP-SERV OF PT,EA 15 MIN: HCPCS

## 2025-01-17 NOTE — PROGRESS NOTES
INR 1.6. Patient to take 3 mg of coumadin daily except for Saturday 6 mg. Repeat INR on 01/27/2025.

## 2025-01-17 NOTE — TELEPHONE ENCOUNTER
----- Message from TULIO Finney sent at 1/16/2025  6:06 PM EST -----  INR better 1.6   Please advise 3 mg daily except 6 mg on Saturday  INR 1/27 please

## 2025-01-21 ENCOUNTER — HOME CARE VISIT (OUTPATIENT)
Dept: HOME HEALTH SERVICES | Facility: HOME HEALTHCARE | Age: 65
End: 2025-01-21
Payer: COMMERCIAL

## 2025-01-21 VITALS — DIASTOLIC BLOOD PRESSURE: 68 MMHG | OXYGEN SATURATION: 96 % | HEART RATE: 100 BPM | SYSTOLIC BLOOD PRESSURE: 112 MMHG

## 2025-01-21 PROCEDURE — G0151 HHCP-SERV OF PT,EA 15 MIN: HCPCS

## 2025-01-22 ENCOUNTER — HOME CARE VISIT (OUTPATIENT)
Dept: HOME HEALTH SERVICES | Facility: HOME HEALTHCARE | Age: 65
End: 2025-01-22
Payer: COMMERCIAL

## 2025-01-22 ENCOUNTER — TELEPHONE (OUTPATIENT)
Age: 65
End: 2025-01-22

## 2025-01-22 VITALS — SYSTOLIC BLOOD PRESSURE: 118 MMHG | HEART RATE: 74 BPM | OXYGEN SATURATION: 98 % | DIASTOLIC BLOOD PRESSURE: 70 MMHG

## 2025-01-22 VITALS
HEART RATE: 96 BPM | TEMPERATURE: 97.3 F | SYSTOLIC BLOOD PRESSURE: 119 MMHG | OXYGEN SATURATION: 96 % | RESPIRATION RATE: 20 BRPM | DIASTOLIC BLOOD PRESSURE: 78 MMHG

## 2025-01-22 DIAGNOSIS — R56.9 SEIZURE (HCC): ICD-10-CM

## 2025-01-22 DIAGNOSIS — E11.65 TYPE 2 DIABETES MELLITUS WITH HYPERGLYCEMIA, UNSPECIFIED WHETHER LONG TERM INSULIN USE (HCC): Primary | ICD-10-CM

## 2025-01-22 DIAGNOSIS — E03.4 HYPOTHYROIDISM DUE TO ACQUIRED ATROPHY OF THYROID: Chronic | ICD-10-CM

## 2025-01-22 PROCEDURE — G0299 HHS/HOSPICE OF RN EA 15 MIN: HCPCS

## 2025-01-22 PROCEDURE — G0152 HHCP-SERV OF OT,EA 15 MIN: HCPCS

## 2025-01-22 RX ORDER — LEVETIRACETAM 750 MG/1
750 TABLET ORAL EVERY 12 HOURS SCHEDULED
Qty: 60 TABLET | Refills: 3 | Status: SHIPPED | OUTPATIENT
Start: 2025-01-22

## 2025-01-22 RX ORDER — VALPROIC ACID 250 MG/5ML
500 SOLUTION ORAL EVERY 6 HOURS SCHEDULED
Qty: 946 ML | Refills: 3 | Status: SHIPPED | OUTPATIENT
Start: 2025-01-22

## 2025-01-22 RX ORDER — LEVOTHYROXINE SODIUM 88 UG/1
88 TABLET ORAL
Qty: 30 TABLET | Refills: 5 | Status: SHIPPED | OUTPATIENT
Start: 2025-01-22

## 2025-01-22 NOTE — TELEPHONE ENCOUNTER
Patient's home care nurse called stating patient needs test strips asap for her glucose monitor. However, patient would like to get them through [facility]. Please call patient back asa to discuss.

## 2025-01-24 NOTE — TELEPHONE ENCOUNTER
Christi from [facility] called to advise that they do not provide the blood glucose strips. Please advise. Thank you.

## 2025-01-25 DIAGNOSIS — I51.7 CARDIOMEGALY: ICD-10-CM

## 2025-01-25 DIAGNOSIS — E03.4 HYPOTHYROIDISM DUE TO ACQUIRED ATROPHY OF THYROID: Chronic | ICD-10-CM

## 2025-01-25 RX ORDER — PROPRANOLOL HCL 20 MG
20 TABLET ORAL EVERY 12 HOURS
Qty: 180 TABLET | Refills: 2 | Status: SHIPPED | OUTPATIENT
Start: 2025-01-25

## 2025-01-28 NOTE — TELEPHONE ENCOUNTER
Please advise Constanza that eve does not provide glucometer strips-where would she kong me to send order??

## 2025-01-28 NOTE — TELEPHONE ENCOUNTER
Spoke with Constanza and made aware and she would like the strips to be sent to [facility].

## 2025-01-29 RX ORDER — BLOOD SUGAR DIAGNOSTIC
1 STRIP MISCELLANEOUS
Qty: 100 STRIP | Refills: 1 | Status: SHIPPED | OUTPATIENT
Start: 2025-01-29

## 2025-01-30 ENCOUNTER — HOME CARE VISIT (OUTPATIENT)
Dept: HOME HEALTH SERVICES | Facility: HOME HEALTHCARE | Age: 65
End: 2025-01-30
Payer: COMMERCIAL

## 2025-01-30 VITALS — SYSTOLIC BLOOD PRESSURE: 122 MMHG | DIASTOLIC BLOOD PRESSURE: 78 MMHG

## 2025-01-30 PROCEDURE — G0151 HHCP-SERV OF PT,EA 15 MIN: HCPCS

## 2025-02-02 PROBLEM — E11.21 DIABETIC NEPHROPATHY ASSOCIATED WITH TYPE 2 DIABETES MELLITUS (HCC): Status: RESOLVED | Noted: 2024-08-20 | Resolved: 2025-02-02

## 2025-02-02 PROBLEM — E21.3 HYPERPARATHYROIDISM (HCC): Status: RESOLVED | Noted: 2020-06-24 | Resolved: 2025-02-02

## 2025-02-04 ENCOUNTER — HOME CARE VISIT (OUTPATIENT)
Dept: HOME HEALTH SERVICES | Facility: HOME HEALTHCARE | Age: 65
End: 2025-02-04
Payer: COMMERCIAL

## 2025-02-04 PROCEDURE — G0151 HHCP-SERV OF PT,EA 15 MIN: HCPCS

## 2025-02-05 ENCOUNTER — TELEPHONE (OUTPATIENT)
Dept: FAMILY MEDICINE CLINIC | Facility: CLINIC | Age: 65
End: 2025-02-05

## 2025-02-05 NOTE — TELEPHONE ENCOUNTER
Patients care giver shaun called and is asking which dose of levothyroxine she should be taking - 88 mcg is on chart but she states pharmacy giver her Rx for 100mcg daily

## 2025-02-05 NOTE — TELEPHONE ENCOUNTER
Called and discussed w pt -- advised she discard 100 mcg dose and continue w 88mcg as prescribed. Last TSH in chart November 24.     At time of call also discussed having INR re-ck as patient is overdue since Jan 27.

## 2025-02-12 ENCOUNTER — TELEPHONE (OUTPATIENT)
Age: 65
End: 2025-02-12

## 2025-02-12 NOTE — TELEPHONE ENCOUNTER
Venessa from [facility] called because they need a copy of the patients last office visit notes faxed to the number she provided below, she said it is in regards to the patients oxygen. Please advise. Thank you.    Fax:[phone]

## 2025-02-17 PROBLEM — E66.01 MORBID OBESITY WITH BMI OF 50.0-59.9, ADULT (HCC): Status: RESOLVED | Noted: 2021-06-24 | Resolved: 2025-02-17

## 2025-02-20 ENCOUNTER — TELEPHONE (OUTPATIENT)
Dept: NEPHROLOGY | Facility: CLINIC | Age: 65
End: 2025-02-20

## 2025-02-20 NOTE — TELEPHONE ENCOUNTER
Spoke with patient's caregiver letting them know a request was put in to [facility] to schedule home draw for labs.  They expressed understanding and thanked us for the call.

## 2025-02-27 DIAGNOSIS — E03.4 HYPOTHYROIDISM DUE TO ACQUIRED ATROPHY OF THYROID: Chronic | ICD-10-CM

## 2025-02-28 ENCOUNTER — TELEPHONE (OUTPATIENT)
Dept: NEPHROLOGY | Facility: CLINIC | Age: 65
End: 2025-02-28

## 2025-02-28 RX ORDER — LEVOTHYROXINE SODIUM 88 UG/1
88 TABLET ORAL
Qty: 90 TABLET | Refills: 2 | Status: SHIPPED | OUTPATIENT
Start: 2025-02-28

## 2025-02-28 NOTE — TELEPHONE ENCOUNTER
----- Message from Delmar Winchester MD sent at 2/27/2025  8:51 AM EST -----  I would recommend a VBG and SPEP UPEP and light chain ratio in the next week or so  Make sure she is on iron 3 days a week we can offer her iron infusion let her know  Tell her I hope she feels better

## 2025-03-03 ENCOUNTER — TELEPHONE (OUTPATIENT)
Age: 65
End: 2025-03-03

## 2025-03-12 DIAGNOSIS — J96.12 CHRONIC RESPIRATORY FAILURE WITH HYPERCAPNIA (HCC): ICD-10-CM

## 2025-03-13 RX ORDER — ALBUTEROL SULFATE 90 UG/1
INHALANT RESPIRATORY (INHALATION)
Qty: 6.7 G | Refills: 5 | Status: SHIPPED | OUTPATIENT
Start: 2025-03-13

## 2025-03-20 ENCOUNTER — TELEPHONE (OUTPATIENT)
Dept: FAMILY MEDICINE CLINIC | Facility: CLINIC | Age: 65
End: 2025-03-20

## 2025-03-20 NOTE — TELEPHONE ENCOUNTER
Patient called requesting refill for Albuterol inhaler. Patient made aware medication was refilled on 03/13/25 for 6.7 g with 5 refills to [facility] pharmacy. Patient instructed to contact the pharmacy to obtain refills of medication. Patient verbalized understanding.

## 2025-03-24 DIAGNOSIS — E03.4 HYPOTHYROIDISM DUE TO ACQUIRED ATROPHY OF THYROID: Chronic | ICD-10-CM

## 2025-03-24 DIAGNOSIS — I51.7 CARDIOMEGALY: ICD-10-CM

## 2025-03-24 NOTE — TELEPHONE ENCOUNTER
Patient called the RX Refill Line. Message is being forwarded to the office.     Patient is requesting - Pt caregiver called and stated pharmacy keeps messing up with the Pt prescription for propranolol 20 mg and metoprolol 50 mg. Per Constanza when Pt was at the hospital back in November the metoprolol was discontinued and Pt should only be taking propranolol. Constanza also states that the pharmacy dispensed yesterday Levothyroxine 100 mcg instead of 88 mcg and they are confused on which med the Pt should be taking. Please review and reach out to Constanza, thank you.    Please contact patient at - [phone]

## 2025-03-25 DIAGNOSIS — E11.65 TYPE 2 DIABETES MELLITUS WITH HYPERGLYCEMIA, UNSPECIFIED WHETHER LONG TERM INSULIN USE (HCC): ICD-10-CM

## 2025-03-25 RX ORDER — LEVOTHYROXINE SODIUM 88 UG/1
88 TABLET ORAL
Qty: 90 TABLET | Refills: 2 | Status: SHIPPED | OUTPATIENT
Start: 2025-03-25 | End: 2025-03-25 | Stop reason: SDUPTHER

## 2025-03-25 RX ORDER — PROPRANOLOL HCL 20 MG
20 TABLET ORAL EVERY 12 HOURS
Qty: 180 TABLET | Refills: 2 | Status: SHIPPED | OUTPATIENT
Start: 2025-03-25 | End: 2025-03-25 | Stop reason: SDUPTHER

## 2025-03-25 RX ORDER — BLOOD SUGAR DIAGNOSTIC
1 STRIP MISCELLANEOUS
Qty: 100 STRIP | Refills: 1 | Status: SHIPPED | OUTPATIENT
Start: 2025-03-25

## 2025-03-25 RX ORDER — PROPRANOLOL HCL 20 MG
20 TABLET ORAL EVERY 12 HOURS
Qty: 180 TABLET | Refills: 2 | Status: SHIPPED | OUTPATIENT
Start: 2025-03-25

## 2025-03-25 RX ORDER — LEVOTHYROXINE SODIUM 88 UG/1
88 TABLET ORAL
Qty: 90 TABLET | Refills: 2 | Status: SHIPPED | OUTPATIENT
Start: 2025-03-25

## 2025-03-25 NOTE — TELEPHONE ENCOUNTER
She should be taking propranolol 20 mg twice daily and Levothyroxine 88 mcg daily.     Our list is updated. I sent refills to the pharmacy

## 2025-03-25 NOTE — TELEPHONE ENCOUNTER
Called and spoke w pt caretaker -- she acknowledged. She is requesting both [facility] at [facility] [address] [facility]

## 2025-03-25 NOTE — TELEPHONE ENCOUNTER
Marva, nurse with [facility] called to report pt never received test strips and pharmacy has no order,    Reason for call:   [x] Refill   [] Prior Auth  [] Other:     Office:   [x] PCP/Provider - Caitie Sam,   [] Specialty/Provider -     Medication:     glucose blood (OneTouch Verio) test strip       Dose/Frequency: 1 daily    Quantity: 100    Pharmacy: [facility]    Local Pharmacy   Does the patient have enough for 3 days?   [] Yes   [x] No - Send as HP to POD    Mail Away Pharmacy   Does the patient have enough for 10 days?   [] Yes   [] No - Send as HP to POD

## 2025-04-14 DIAGNOSIS — K21.9 GASTROESOPHAGEAL REFLUX DISEASE WITHOUT ESOPHAGITIS: ICD-10-CM

## 2025-04-14 DIAGNOSIS — I48.0 PAROXYSMAL ATRIAL FIBRILLATION (HCC): Chronic | ICD-10-CM

## 2025-04-14 DIAGNOSIS — F41.9 ANXIETY: ICD-10-CM

## 2025-04-15 RX ORDER — FAMOTIDINE 20 MG/1
20 TABLET, FILM COATED ORAL DAILY
Qty: 90 TABLET | Refills: 1 | Status: SHIPPED | OUTPATIENT
Start: 2025-04-15

## 2025-04-16 RX ORDER — METOPROLOL TARTRATE 50 MG
50 TABLET ORAL 2 TIMES DAILY
Qty: 180 TABLET | Refills: 3 | OUTPATIENT
Start: 2025-04-16

## 2025-04-17 DIAGNOSIS — E78.5 DYSLIPIDEMIA: ICD-10-CM

## 2025-04-17 RX ORDER — ATORVASTATIN CALCIUM 10 MG/1
10 TABLET, FILM COATED ORAL DAILY
Qty: 90 TABLET | Refills: 1 | Status: SHIPPED | OUTPATIENT
Start: 2025-04-17

## 2025-04-24 DIAGNOSIS — J44.9 CHRONIC OBSTRUCTIVE PULMONARY DISEASE, UNSPECIFIED COPD TYPE (HCC): ICD-10-CM

## 2025-04-24 RX ORDER — ALBUTEROL SULFATE 0.83 MG/ML
SOLUTION RESPIRATORY (INHALATION)
Qty: 300 ML | Refills: 5 | Status: SHIPPED | OUTPATIENT
Start: 2025-04-24 | End: 2025-05-02 | Stop reason: SDUPTHER

## 2025-04-26 DIAGNOSIS — I48.11 LONGSTANDING PERSISTENT ATRIAL FIBRILLATION (HCC): ICD-10-CM

## 2025-04-28 ENCOUNTER — TELEPHONE (OUTPATIENT)
Age: 65
End: 2025-04-28

## 2025-04-28 RX ORDER — WARFARIN SODIUM 3 MG/1
3 TABLET ORAL DAILY
Qty: 90 TABLET | Refills: 3 | Status: SHIPPED | OUTPATIENT
Start: 2025-04-28

## 2025-04-28 NOTE — TELEPHONE ENCOUNTER
Elke is calling to get last office notes.  Venessa WOMACK stated she needs this for patient's oxygen.  Fax [phone]    Or call with any questions to [phone] Ex [phone]    Thank you

## 2025-04-29 ENCOUNTER — OFFICE VISIT (OUTPATIENT)
Dept: NEUROLOGY | Facility: CLINIC | Age: 65
End: 2025-04-29
Payer: COMMERCIAL

## 2025-04-29 VITALS
HEART RATE: 74 BPM | BODY MASS INDEX: 45.52 KG/M2 | SYSTOLIC BLOOD PRESSURE: 120 MMHG | DIASTOLIC BLOOD PRESSURE: 90 MMHG | HEIGHT: 64 IN

## 2025-04-29 DIAGNOSIS — Z87.898 HISTORY OF SEIZURE: ICD-10-CM

## 2025-04-29 DIAGNOSIS — R56.9 SEIZURE (HCC): ICD-10-CM

## 2025-04-29 DIAGNOSIS — R56.9 UNSPECIFIED CONVULSIONS (HCC): ICD-10-CM

## 2025-04-29 DIAGNOSIS — R53.1 GENERALIZED WEAKNESS: Primary | ICD-10-CM

## 2025-04-29 PROCEDURE — 99205 OFFICE O/P NEW HI 60 MIN: CPT | Performed by: STUDENT IN AN ORGANIZED HEALTH CARE EDUCATION/TRAINING PROGRAM

## 2025-04-29 RX ORDER — LEVETIRACETAM 750 MG/1
750 TABLET ORAL EVERY 12 HOURS SCHEDULED
Qty: 90 TABLET | Refills: 3 | Status: SHIPPED | OUTPATIENT
Start: 2025-04-29

## 2025-04-29 NOTE — PROGRESS NOTES
Name: Adelina Soto      : 1960      MRN: [id number]  Encounter Provider: Jerrica Uribe MD  Encounter Date: 2025   Encounter department: [facility]  :  Assessment & Plan  Generalized weakness  Suspected mainly due to deconditioning after prolonged hospital stay with sedentary lifestyle. Referral placed for home PT  Orders:    Referral to Home Health- [facility]; Future    History of seizure  Found to be in status epilepticus which was controlled on keppra 750 mg BID and depakote 500 mg q6 while hospitalized. Cause of status epilepticus unclear - suspected due to viral encephalitis.  She has not had any seizures since her discharge in December. Of note, during this time she has not been compliant with depakote as prescribed at time of discharge. Depakote has been given 250 mg q6 (instead of 500 mg q6) for the last several months until about 2 weeks ago, at which time depakote was discontinued completely by her SO due to side effects. Keppra has been continued at the correct dose for the same period of time. I advised pt and SO of the significant dangers associated with making adjustments to or discontinuing AEDs without the guidance of a physician, especially in the case of this patient with a history of status epilepticus. Pt refused to restart depakote due to side effects. Will plan for now to continue keppra 750 mg BID. I advised them to inform the office immediately if a breakthrough seizure occurs on keppra monotherapy, and advised presentation to the ED if she does not return to her baseline mentation afterward. Since AEDs have recently been adjusted, recommended seizure precautions for an additional 6 months, including no driving, no bathing/swimming without supervision, or otherwise engaging in any activities which could cause harm to herself or others if she were to lose consciousness         CC:   Hfu status epilepticus    History of Present Illness:      64 year old female with history of Afib on Warfarin, HTN, T2DM, morbid obesity, HLD, hypothyroidism, initially presented to [facility] on 11/12 after being found by her partner at home with AMS, seizure-like activity. EMS witnessed tonic-clonic seizure-like activity, treated with 6 mg of Versed. 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 Was subsequently intubated for airway protection and transferred to [facility]. Initial LP generally unremarkable apart from RBC's 300's from otherwise atraumatic tap, negative ME panel and HSV. 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    Monitored on vEEG, found to be in status epilepticus with repetitive L frontal seizures and L temporal sharps. Ultimately, she was controlled on keppra 750 mg BID and depakote 500 mg q6.      Transferred to MICU on 11/17, during evaluation pressors were weaned and AEDs adjusted. Underwent CT head which appeared unchanged, LP on 11/19 with fluoro unsuccessful. Off video EEG 11/20, repeat MRI showed no acute findings. Overall very gradual improvements in neurological exam day-to-day, able to breath spontaneously on the ventilator and follow basic commands. Patient was ultimately extubated and downgraded out of ICU.  She did not require tracheostomy or PEG tube.  Patient was recommended to go to postacute rehab due to being a 2-3 person max assist with her BMI of 45, weighing 120 kg, but patient expressed that she had a traumatic experience in the rehab facility in the past and did not want to go.     Today, she is accompanied by her SO and is wheelchair-bound. She states that she had home PT for 8 weeks after her admission. She has not been very active and states that she now has great  difficulty walking.     At time of discharge, she was instructed to continue keppra 750 mg BID and depakote 500 mg q6h. However, SO states that she had been giving her depakote 250 mg q6h. She states that about 2 weeks ago she stopped giving her depakote entirely due to it causing side effects including confusion and dysarthria. Since completely discontinuing the medication, she has not had these side effects. She has not had any breakthrough seizures since her admission, including the last 2 weeks when her depakote has been completely discontinued. She remains compliant with keppra 750 mg BID.     Past Medical History:     Past Medical History:   Diagnosis Date    Anemia     Arthritis     Cancer of kidney (HCC)     Chronic kidney disease     Diabetes mellitus (HCC)     Disease of thyroid gland     HLD (hyperlipidemia)     Hypertension     Ovarian cancer (HCC)     Pneumonia        Patient Active Problem List   Diagnosis    Hypertensive chronic kidney disease with stage 1 through stage 4 chronic kidney disease, or unspecified chronic kidney disease    Persistent proteinuria    Iron deficiency    Dyslipidemia    Cellulitis    H/O right nephrectomy    Paroxysmal atrial fibrillation (HCC)    COPD with asthma (HCC)    Hypothyroid    Chronic constipation    GERD (gastroesophageal reflux disease)    Anxiety    Secondary hyperparathyroidism of renal origin (HCC)    Depression, recurrent (HCC)    Pre-ulcerative corn or callous    Cardiomegaly    Abnormal CT scan    Panniculus    Bilateral pleural effusion    Panniculitis    History of hysterectomy    CKD (chronic kidney disease)    Anemia in stage 3b chronic kidney disease  (HCC)    Chronic respiratory failure with hypoxia (HCC)    Chronic respiratory failure with hypercapnia (HCC)    Chronic anticoagulation    Type 2 diabetes mellitus with hyperglycemia, unspecified whether long term insulin use (HCC)    Coagulopathy (HCC)    Seizure (HCC)    Bacteriuria    Acute on chronic  hypoxic respiratory failure (HCC)    Bacteremia due to Staphylococcus    Thigh hematoma, left, initial encounter       Medications:      Current Outpatient Medications   Medication Sig Dispense Refill    albuterol (2.5 mg/3 mL) 0.083 % nebulizer solution USE 1 VIAL IN NEBULIZER EVERY 6 HOURS - as needed 300 mL 5    albuterol (PROVENTIL HFA,VENTOLIN HFA) 90 mcg/act inhaler INHALE 1 PUFF EVERY 4 HOURS AS NEEDED FOR WHEEZING. 6.7 g 5    atorvastatin (LIPITOR) 10 mg tablet TAKE 1 TABLET BY MOUTH EVERY DAY 90 tablet 1    cholecalciferol (VITAMIN D3) 400 units tablet Take 1 tablet (400 Units total) by mouth daily (Patient taking differently: Take 400 Units by mouth daily Pt takes 1000 iu daily) 100 tablet 1    famotidine (PEPCID) 20 mg tablet TAKE 1 TABLET BY MOUTH EVERY DAY 90 tablet 1    ferrous sulfate 325 (65 Fe) mg tablet Take 1 tablet (325 mg total) by mouth daily with breakfast Every other day 45 tablet 1    ipratropium (ATROVENT) 0.02 % nebulizer solution USE 1 VIAL IN NEBULIZER 4 TIMES DAILY 300 mL 5    levETIRAcetam (KEPPRA) 750 mg tablet Take 1 tablet (750 mg total) by mouth every 12 (twelve) hours 90 tablet 3    levothyroxine 88 mcg tablet Take 1 tablet (88 mcg total) by mouth daily in the early morning 90 tablet 2    LORazepam (ATIVAN) 0.5 mg tablet Take 1 tablet (0.5 mg total) by mouth 2 (two) times a day as needed for anxiety 60 tablet 2    oxygen gas Inhale 2 L/min continuous. Via nasal cannula      pantoprazole (PROTONIX) 40 mg tablet Take 1 tablet (40 mg total) by mouth daily 90 tablet 3    propranolol (INDERAL) 20 mg tablet Take 1 tablet (20 mg total) by mouth every 12 (twelve) hours 180 tablet 2    sertraline (ZOLOFT) 50 mg tablet TAKE 1 TABLET BY MOUTH EVERY DAY 90 tablet 1    valproic acid (DEPAKENE) 250 MG/5ML soln TAKE 10 ML (500 MG TOTAL) BY MOUTH EVERY 6 (SIX) HOURS 946 mL 3    warfarin (Coumadin) 1 mg tablet Take 1 tablet (1 mg total) by mouth daily Monday,Wednesday, Friday 3 mg Sunday,  "Saturday, Tuesday, Thursday 1 mg 120 tablet 0    warfarin (COUMADIN) 3 mg tablet TAKE 1 TABLET BY MOUTH DAILY 90 tablet 3    atorvastatin (LIPITOR) 10 mg tablet Take 1 tablet (10 mg total) by mouth daily with dinner (Patient not taking: Reported on 4/29/2025) 30 tablet 0    Blood Glucose Monitoring Suppl (Accu-Chek Guide) w/Device KIT Use daily before breakfast (Patient not taking: Reported on 4/29/2025) 1 kit 0    furosemide (LASIX) 40 mg tablet Take 1 tablet (40 mg total) by mouth daily (Patient not taking: Reported on 12/10/2024) 90 tablet 3    glucose blood (OneTouch Verio) test strip Use 1 each daily before breakfast Use as instructed (Patient not taking: Reported on 4/29/2025) 100 strip 1    ipratropium-albuterol (DUO-NEB) 0.5-2.5 mg/3 mL nebulizer solution Take 3 mL by nebulization every 8 (eight) hours as needed for wheezing or shortness of breath (Patient not taking: Reported on 4/29/2025) 60 mL 3    nystatin (MYCOSTATIN) cream Apply topically 2 (two) times a day (Patient not taking: Reported on 4/29/2025) 30 g 3    OneTouch Delica Lancets 33G MISC Check blood sugars once daily. Please substitute with appropriate alternative as covered by patient's insurance. Dx: E11.65 (Patient not taking: Reported on 4/29/2025) 100 each 3    thiamine 100 MG tablet Take 1 tablet (100 mg total) by mouth daily (Patient not taking: Reported on 4/29/2025) 30 tablet 0    triamcinolone (KENALOG) 0.1 % ointment APPLY TO AFFECTED AREA TWICE A DAY (Patient not taking: Reported on 4/29/2025) 160 g 3     No current facility-administered medications for this visit.        Allergies:      Allergies   Allergen Reactions    Nystatin Dermatitis     Per SO shaun it makes her \"more sore\"       Family History:     Family History   Problem Relation Age of Onset    No Known Problems Mother     No Known Problems Father        Social History:       Social History     Socioeconomic History    Marital status: Single     Spouse name: Not on file    " Number of children: 0    Years of education: Not on file    Highest education level: 12th grade   Occupational History    Not on file   Tobacco Use    Smoking status: Former     Current packs/day: 0.00     Average packs/day: 3.0 packs/day for 30.0 years (90.0 ttl pk-yrs)     Types: Cigarettes     Start date: 10/22/1980     Quit date: 10/22/2010     Years since quittin.5    Smokeless tobacco: Former     Quit date: 2011    Tobacco comments:     quit approx. 9 years ago   Vaping Use    Vaping status: Never Used   Substance and Sexual Activity    Alcohol use: Never     Comment: n/a    Drug use: Yes     Types: Marijuana     Comment: smokes with girlfriend when anxious    Sexual activity: Yes     Partners: Female   Other Topics Concern    Not on file   Social History Narrative    · Most recent tobacco use screenin2020      · Do you currently or have you served in the [employer]:   No      · Were you activated, into active duty, as a member of the [employer] or as a Reservist:   No      ·     Last modified by [email or web address]   2020, 10:39      Social Drivers of Health     Financial Resource Strain: Medium Risk (2023)    Overall Financial Resource Strain (CARDIA)     Difficulty of Paying Living Expenses: Somewhat hard   Food Insecurity: No Food Insecurity (2024)    Nursing - Inadequate Food Risk Classification     Worried About Running Out of Food in the Last Year: Never true     Ran Out of Food in the Last Year: Never true     Ran Out of Food in the Last Year: Never true   Transportation Needs: Unmet Transportation Needs (2025)    OASIS : Transportation     Lack of Transportation (Medical): No     Lack of Transportation (Non-Medical): Yes     Patient Unable or Declines to Respond: No   Physical Activity: Not on file   Stress: No Stress Concern Present (10/22/2020)    [employer] - Occupational Stress Questionnaire     Feeling of Stress : Not at all  "  Social Connections: Socially Isolated (10/22/2020)    Social Connection and Isolation Panel [NHANES]     Frequency of Communication with Friends and Family: Once a week     Frequency of Social Gatherings with Friends and Family: Never     Attends Gnosticist Services: Never     Active Member of Clubs or Organizations: No     Attends Club or Organization Meetings: Never     Marital Status: Never    Intimate Partner Violence: Unknown (2024)    Nursing IPS     Feels Physically and Emotionally Safe: Not on file     Physically Hurt by Someone: Not on file     Humiliated or Emotionally Abused by Someone: Not on file     Physically Hurt by Someone: No     Hurt or Threatened by Someone: No   Housing Stability: Unknown (2024)    Nursing: Inadequate Housing Risk Classification     Has Housing: Not on file     Worried About Losing Housing: Not on file     Unable to Get Utilities: Not on file     Unable to Pay for Housing in the Last Year: No     Has Housin         Objective:   /90 (BP Location: Right arm, Patient Position: Sitting, Cuff Size: Large)   Pulse 74   Ht 5' 4\" (1.626 m)   BMI 45.52 kg/m²     General: Patient is not in any acute/apparent distress, well nourished, well developed and cooperative.   HEENT: normocephalic, atraumatic, moist membranes  Neck: supple  Extremities: no edema noted   Skin: no lesions or rash  Musculosketal: no bony abnormalities    Neurologic Examination:   Mental status: alert, awake, oriented X 3 and following commands.     Speech/Language: Speech is fluent without any dysarthria, no aphasia noted, comprehension intact    Cranial Nerves:   CN I: smell not tested  CN II: Visual fields full to confrontation  CN III, IV, VI: Extraocular movements intact bilaterally. Pupils equal round and reactive to light bilaterally.  CN V: Facial sensation is normal.  CN VII: Full and symmetric facial movement.  CN VIII: Hearing is normal.  CN IX, X: Palate elevates " symmetrically.   CN XI: Shoulder shrug strength is normal.  CN XII: Tongue midline without atrophy or fasciculations.    Motor:   Strength 4/5 throughout BUE. 3/5 throughout BLE.  Bulk/tone - normal.  Fasiculations - none    Sensory:   Sensation intact to soft touch, vibration and pinprick in all 4 extremities.    Cerebellar:   Finger-to-nose intact, normal heel to shin.    Reflexes: 1+ in all 4 extremities    Gait:   Wheelchair-bound. Deferred due to safety     Imaging:   MRI brain w/wo 11/20/24  FINDINGS:     BRAIN PARENCHYMA: No white matter lesions.     No restricted diffusion to indicate an acute infarct.     No intracranial hemorrhage, mass or mass effect.     VENTRICLES: No hydrocephalus or extra-axial collection.     SELLA AND PITUITARY GLAND:  Normal.     ORBITS:  Normal.     PARANASAL SINUSES: Mucosal thickening in the sphenoid sinuses. Air-fluid level in the nasopharynx likely secondary to intubation.     VASCULATURE:  Evaluation of the major intracranial vasculature demonstrates appropriate flow voids.     CALVARIUM AND SKULL BASE: Bilateral mastoid effusions, likely secondary to intubation.     EXTRACRANIAL SOFT TISSUES:  Normal.     IMPRESSION:     No evidence of acute infarct, intracranial hemorrhage or mass.    I have spent a total time of 60 minutes in caring for this patient on the day of the visit/encounter including Risks and benefits of tx options, Instructions for management, Patient and family education, Risk factor reductions, Impressions, Documenting in the medical record, Reviewing/placing orders in the medical record (including tests, medications, and/or procedures), and Obtaining or reviewing history  .

## 2025-04-30 DIAGNOSIS — R56.9 SEIZURE (HCC): ICD-10-CM

## 2025-04-30 DIAGNOSIS — D62 ACUTE BLOOD LOSS ANEMIA: ICD-10-CM

## 2025-04-30 DIAGNOSIS — K92.1 MELENA: ICD-10-CM

## 2025-05-01 RX ORDER — PANTOPRAZOLE SODIUM 40 MG/1
40 TABLET, DELAYED RELEASE ORAL DAILY
Qty: 90 TABLET | Refills: 3 | OUTPATIENT
Start: 2025-05-01

## 2025-05-01 RX ORDER — VALPROIC ACID 250 MG/5ML
500 SOLUTION ORAL EVERY 6 HOURS SCHEDULED
Qty: 946 ML | Refills: 3 | Status: SHIPPED | OUTPATIENT
Start: 2025-05-01

## 2025-05-02 ENCOUNTER — TELEPHONE (OUTPATIENT)
Dept: FAMILY MEDICINE CLINIC | Facility: CLINIC | Age: 65
End: 2025-05-02

## 2025-05-02 DIAGNOSIS — J44.9 CHRONIC OBSTRUCTIVE PULMONARY DISEASE, UNSPECIFIED COPD TYPE (HCC): ICD-10-CM

## 2025-05-02 DIAGNOSIS — J96.12 CHRONIC RESPIRATORY FAILURE WITH HYPERCAPNIA (HCC): ICD-10-CM

## 2025-05-02 RX ORDER — ALBUTEROL SULFATE 0.83 MG/ML
2.5 SOLUTION RESPIRATORY (INHALATION) EVERY 6 HOURS PRN
Qty: 30 ML | Refills: 0 | Status: SHIPPED | OUTPATIENT
Start: 2025-05-02

## 2025-05-02 NOTE — TELEPHONE ENCOUNTER
Pharmacy called the RX Refill Line. Message is being forwarded to the office.     Pharmacy is unable to approve the script electronically for albuterol and ipratropium. There is a glitch in their system that when they try to approve it, it shuts their computer down. They are asking for paper scripts to be faxed to [phone]    Please contact patient at [phone]

## 2025-05-05 NOTE — ASSESSMENT & PLAN NOTE
Centrally at goal no changes push diet and exercise as able  Orders:    CBC; Future    Basic metabolic panel; Future    Magnesium; Future    Comprehensive metabolic panel; Future    CBC; Future    Ferritin; Future    TIBC Panel (incl. Iron, TIBC, % Iron Saturation); Future    PTH, intact; Future    Protein / creatinine ratio, urine; Future    Magnesium; Future    Lipid Panel with Direct LDL reflex; Future    Vitamin D 25 hydroxy; Future

## 2025-05-05 NOTE — ASSESSMENT & PLAN NOTE
Stable hemoglobin but will check iron studies    Orders:    CBC; Future    Basic metabolic panel; Future    Magnesium; Future    Comprehensive metabolic panel; Future    CBC; Future    Ferritin; Future    TIBC Panel (incl. Iron, TIBC, % Iron Saturation); Future    PTH, intact; Future    Protein / creatinine ratio, urine; Future    Magnesium; Future    Lipid Panel with Direct LDL reflex; Future    Vitamin D 25 hydroxy; Future

## 2025-05-05 NOTE — PROGRESS NOTES
Name: Adelina Soto      : 1960      MRN: [id number]  Encounter Provider: Delmar Winchester MD  Encounter Date: 2025   Encounter department: [facility] NEPHROLOGY [facility]  :  Assessment & Plan  Type 2 diabetes mellitus with hyperglycemia, unspecified whether long term insulin use (HCC)  Given recent multiple events at this juncture observe continue off SGLT2 inhibitor  Orders:    CBC; Future    Basic metabolic panel; Future    Magnesium; Future    Comprehensive metabolic panel; Future    CBC; Future    Ferritin; Future    TIBC Panel (incl. Iron, TIBC, % Iron Saturation); Future    PTH, intact; Future    Protein / creatinine ratio, urine; Future    Magnesium; Future    Lipid Panel with Direct LDL reflex; Future    Vitamin D 25 hydroxy; Future    Hypertensive chronic kidney disease with stage 1 through stage 4 chronic kidney disease, or unspecified chronic kidney disease  Doing well no changes meds are for cardiac purposes  Orders:    CBC; Future    Basic metabolic panel; Future    Magnesium; Future    Comprehensive metabolic panel; Future    CBC; Future    Ferritin; Future    TIBC Panel (incl. Iron, TIBC, % Iron Saturation); Future    PTH, intact; Future    Protein / creatinine ratio, urine; Future    Magnesium; Future    Lipid Panel with Direct LDL reflex; Future    Vitamin D 25 hydroxy; Future    Stage 3b chronic kidney disease (HCC)  At baseline no changes    Persistent proteinuria  At goal no changes  Orders:    CBC; Future    Basic metabolic panel; Future    Magnesium; Future    Comprehensive metabolic panel; Future    CBC; Future    Ferritin; Future    TIBC Panel (incl. Iron, TIBC, % Iron Saturation); Future    PTH, intact; Future    Protein / creatinine ratio, urine; Future    Magnesium; Future    Lipid Panel with Direct LDL reflex; Future    Vitamin D 25 hydroxy; Future    Anemia in stage 3b chronic kidney disease  (HCC)  Stable hemoglobin but will check iron studies    Orders:     CBC; Future    Basic metabolic panel; Future    Magnesium; Future    Comprehensive metabolic panel; Future    CBC; Future    Ferritin; Future    TIBC Panel (incl. Iron, TIBC, % Iron Saturation); Future    PTH, intact; Future    Protein / creatinine ratio, urine; Future    Magnesium; Future    Lipid Panel with Direct LDL reflex; Future    Vitamin D 25 hydroxy; Future    Dyslipidemia  Centrally at goal no changes push diet and exercise as able  Orders:    CBC; Future    Basic metabolic panel; Future    Magnesium; Future    Comprehensive metabolic panel; Future    CBC; Future    Ferritin; Future    TIBC Panel (incl. Iron, TIBC, % Iron Saturation); Future    PTH, intact; Future    Protein / creatinine ratio, urine; Future    Magnesium; Future    Lipid Panel with Direct LDL reflex; Future    Vitamin D 25 hydroxy; Future        History of Present Illness   HPI  Adelina Soto is a 65 y.o. female who presents for CKD 3  Prolonged hospitalization November through December 2020 for please see below  The patient overall is feeling well.  Good appetite and fair energy  No fevers, chills, colds but does have a cough from asthma  No hematuria, dysuria, voiding symptoms or foamy urine  No gastrointestinal symptoms  No chest pain, ongoing shortness of breath related to asthma on breathing treatments, and no leg swelling  No headaches, dizziness or lightheadedness unless she gets up quickly  Blood pressure medications:  Propranolol 20 mg twice a day    Renal pertinent medications:  Atorvastatin 10 mg daily  Vitamin D 1000 units daily  Iron every other day      Review of Systems  Please see HPI, otherwise the review of systems as completely reviewed with the patient are negative    Pertinent Medical History   1.  CKD stage 3.:  Etiology:  Right radical nephrectomy July 15, 2016 for renal cell CA/hypertensive nephrosclerosis/arteriolar nephrosclerosis/diabetic nephropathy/?  Morbid obesity with?  FSGS  Baseline creatinine:  new  baseline appears to be from 1.8-as high as 2.4  Current creatinine: 1.33 below baseline!  From 5/9/2025  Urine protein creatinine ratio: 0.451 g at goal  UA microscopic no proteinuria, trace intact heme from 05/13/2020  Recommendations:  Treat hypertension-please see below  Treat dyslipidemia-please see below  Maintain proteinuria less than 1 g or as low as possible  Avoid nephrotoxic agents such as NSAIDs, patient counseled as such  Kidney smart referral refused     2.  Volume:  Current volume:  Euvolemic  Treatment:  No changes off diuretics     3.  Hypertension:   Home blood pressures:    None today        Goal blood pressure:  Less than 130/80  Recommendations:    push nonmedical regimen including weight loss, and any form of exercise patient can not tolerate; avoidance of salt; patient counseled as such  Medication changes today:  No changes as patient's blood pressure meds are for cardiac purposes       4.  Electrolytes:    Chronic metabolic alkalosis most likely from primary CO2 retention currently 40 most likely contraction alkalosis   : Mild hyperkalemia doing well at 4.6     5.  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  Dyslipidemia:  Goal LDL:  Less than 100  Current lipid profile: LDL 39/HDL 28/triglycerides 301 from 11/13/2024  Recommendations: No changes as patient is at goal just push diet and exercise     7.  Anemia:    -Current value 9.6 stable compared to February 2025  -iron studies: Low ferritin at 73 from 7/31/2024 replete every other day: She would strongly prefer avoiding iron infusion.  -B12 and folate deficiency being treated  -multiple myeloma was ruled out in January  -GI evaluation in January of 2018:   colonoscopy demonstrated internal/external hemorrhoids; also colonic polyps which were removed.  There was a suboptimal colonic prep.  Malo  she may require repeat colonoscopy in 3-6 months. EGD demonstrated small hiatal hernia.   GI evaluated the patient December 2022 because of anemia EGD demonstrated gastric duodenal AVMs requiring APC, follow-up secondary endoscopy because of dropping hemoglobin; colonoscopy demonstrated no active bleeding  Capsule endoscopy was recommended never done    Further GI evaluation if warranted if an acute problem.           8.  Other problems:  Right radical nephrectomy for renal cell CA July 15, 2016  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 2, 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  Morbid obesity  Atrial fibrillation  Cellulitis of the left leg back in January treated with intravenous antibiotics  COPD on iron  EGD involving multiple joints  Hypothyroidism on supplement  History of gait dysfunction using motorized scooter to ambulate  Diabetes mellitus/diabetic neuropathy  Admission 8/2021 for abdominal pain-workup is CT/EGD/colonoscopy/push enteroscopy all negative except for gastritis-esophagitis; the patient's symptoms resolved spontaneously.  Patient with a ventral hernia which she deferred in terms of surgical revision, will follow-up with surgery.  Admission to [facility] 12/11/2022 with abdominal wall cellulitis/anemia status posttransfusion  Colonoscopy 11/30/2022 demonstrated diverticula in the sigmoid colon otherwise normal colonoscopy  EGD with push enteroscopy was prematurely aborted secondary respiratory instability  Completed course of antibiotics including Keflex  Peak creatinine 2.19 on admission came down to 1.85  Admission December 3, 2022 with acute hypoxic hypercapnic respiratory failure: Felt to have multifocal pneumonia started on empiric antibiotics treated by pulmonary.  Also GI evaluation for anemia please see above  Admitted 12/9/2024: Seizure-like activity subsequently intubated transferred to [facility] negative HSV/ME/LP.  Treated for antibiotics for spiking  fevers.  Was on pressor support as well.  EEG and MRI no acute findings  Went directly home  Propanolol instead of metoprolol for hyperthyroid she was also treated  Continues on Coumadin            GI health maintenance: 12/2/2022: Normal colonoscopy recommended repeat in 5 years which would be December 2027.            Medical History Reviewed by provider this encounter:     .  Past Medical History   Past Medical History:   Diagnosis Date    Anemia     Arthritis     Cancer of kidney (HCC)     Chronic kidney disease     Diabetes mellitus (HCC)     Disease of thyroid gland     HLD (hyperlipidemia)     Hypertension     Ovarian cancer (HCC)     Pneumonia      Past Surgical History:   Procedure Laterality Date    CHOLECYSTECTOMY      CT GUIDED PERC DRAINAGE CATHETER PLACEMENT  5/16/2016    FL LUMBAR PUNCTURE DIAGNOSTIC  11/19/2024    GALLBLADDER SURGERY  05/01/2004    HYSTERECTOMY  06/01/2018    NEPHRECTOMY Right 08/02/2018     Family History   Problem Relation Age of Onset    No Known Problems Mother     No Known Problems Father       reports that she quit smoking about 14 years ago. Her smoking use included cigarettes. She started smoking about 44 years ago. She has a 90 pack-year smoking history. She quit smokeless tobacco use about 13 years ago. She reports current drug use. Drug: Marijuana. She reports that she does not drink alcohol.  Current Outpatient Medications   Medication Instructions    albuterol (PROVENTIL HFA,VENTOLIN HFA) 90 mcg/act inhaler INHALE 1 PUFF EVERY 4 HOURS AS NEEDED FOR WHEEZING.    albuterol 2.5 mg, Nebulization, Every 6 hours PRN    atorvastatin (LIPITOR) 10 mg, Oral, Daily with dinner    atorvastatin (LIPITOR) 10 mg, Oral, Daily    Blood Glucose Monitoring Suppl (Accu-Chek Guide) w/Device KIT Does not apply, Daily before breakfast    cholecalciferol (VITAMIN D3) 400 Units, Oral, Daily    famotidine (PEPCID) 20 mg, Oral, Daily    ferrous sulfate 325 mg, Oral, Daily with breakfast, Every  "other day    furosemide (LASIX) 40 mg, Oral, Daily    glucose blood (OneTouch Verio) test strip 1 each, Other, Daily before breakfast, Use as instructed    ipratropium (ATROVENT) 0.02 % nebulizer solution USE 1 VIAL IN NEBULIZER 4 TIMES DAILY    ipratropium-albuterol (DUO-NEB) 0.5-2.5 mg/3 mL nebulizer solution 3 mL, Nebulization, Every 8 hours PRN    levETIRAcetam (KEPPRA) 750 mg, Oral, Every 12 hours scheduled    levothyroxine 88 mcg, Oral, Daily (early morning)    LORazepam (ATIVAN) 0.5 mg, Oral, 2 times daily PRN    nystatin (MYCOSTATIN) cream Topical, 2 times daily    OneTouch Delica Lancets 33G MISC Check blood sugars once daily. Please substitute with appropriate alternative as covered by patient's insurance. Dx: E11.65    oxygen 2 L/min, Inhalation, Continuous, Via nasal cannula    pantoprazole (PROTONIX) 40 mg, Oral, Daily    propranolol (INDERAL) 20 mg, Oral, Every 12 hours    sertraline (ZOLOFT) 50 mg, Oral, Daily    thiamine 100 mg, Oral, Daily    triamcinolone (KENALOG) 0.1 % ointment 1 Application, Topical, 2 times daily, To affected area    valproic acid (DEPAKENE) 500 mg, Oral, Every 6 hours scheduled    warfarin (COUMADIN) 1 mg, Oral, Daily (warfarin), Monday,Wednesday, Friday 3 mg Sunday, Saturday, Tuesday, Thursday 1 mg    warfarin (COUMADIN) 3 mg, Oral, Daily     Allergies   Allergen Reactions    Nystatin Dermatitis     Per SO shaun it makes her \"more sore\"      Current Outpatient Medications on File Prior to Visit   Medication Sig Dispense Refill    albuterol (2.5 mg/3 mL) 0.083 % nebulizer solution Take 3 mL (2.5 mg total) by nebulization every 6 (six) hours as needed for wheezing or shortness of breath 30 mL 0    albuterol (PROVENTIL HFA,VENTOLIN HFA) 90 mcg/act inhaler INHALE 1 PUFF EVERY 4 HOURS AS NEEDED FOR WHEEZING. 6.7 g 5    atorvastatin (LIPITOR) 10 mg tablet TAKE 1 TABLET BY MOUTH EVERY DAY 90 tablet 1    cholecalciferol (VITAMIN D3) 400 units tablet Take 1 tablet (400 Units total) by " mouth daily (Patient taking differently: Take 400 Units by mouth daily Pt takes 1000 iu daily) 100 tablet 1    famotidine (PEPCID) 20 mg tablet TAKE 1 TABLET BY MOUTH EVERY DAY 90 tablet 1    ferrous sulfate 325 (65 Fe) mg tablet Take 1 tablet (325 mg total) by mouth daily with breakfast Every other day (Patient taking differently: Take 325 mg by mouth every other day Every other day) 45 tablet 1    ipratropium (ATROVENT) 0.02 % nebulizer solution USE 1 VIAL IN NEBULIZER 4 TIMES DAILY 300 mL 5    levETIRAcetam (KEPPRA) 750 mg tablet Take 1 tablet (750 mg total) by mouth every 12 (twelve) hours 90 tablet 3    levothyroxine 88 mcg tablet Take 1 tablet (88 mcg total) by mouth daily in the early morning 90 tablet 2    LORazepam (ATIVAN) 0.5 mg tablet Take 1 tablet (0.5 mg total) by mouth 2 (two) times a day as needed for anxiety 60 tablet 2    oxygen gas Inhale 2 L/min continuous. Via nasal cannula      pantoprazole (PROTONIX) 40 mg tablet Take 1 tablet (40 mg total) by mouth daily 90 tablet 3    propranolol (INDERAL) 20 mg tablet Take 1 tablet (20 mg total) by mouth every 12 (twelve) hours 180 tablet 2    sertraline (ZOLOFT) 50 mg tablet TAKE 1 TABLET BY MOUTH EVERY DAY 90 tablet 1    valproic acid (DEPAKENE) 250 MG/5ML soln TAKE 10 ML (500 MG TOTAL) BY MOUTH EVERY 6 (SIX) HOURS 946 mL 3    warfarin (Coumadin) 1 mg tablet Take 1 tablet (1 mg total) by mouth daily Monday,Wednesday, Friday 3 mg Sunday, Saturday, Tuesday, Thursday 1 mg 120 tablet 0    warfarin (COUMADIN) 3 mg tablet TAKE 1 TABLET BY MOUTH DAILY 90 tablet 3    atorvastatin (LIPITOR) 10 mg tablet Take 1 tablet (10 mg total) by mouth daily with dinner (Patient not taking: Reported on 5/13/2025) 30 tablet 0    Blood Glucose Monitoring Suppl (Accu-Chek Guide) w/Device KIT Use daily before breakfast (Patient not taking: Reported on 4/29/2025) 1 kit 0    furosemide (LASIX) 40 mg tablet Take 1 tablet (40 mg total) by mouth daily (Patient not taking: Reported on  12/10/2024) 90 tablet 3    glucose blood (OneTouch Verio) test strip Use 1 each daily before breakfast Use as instructed (Patient not taking: Reported on 2025) 100 strip 1    ipratropium-albuterol (DUO-NEB) 0.5-2.5 mg/3 mL nebulizer solution Take 3 mL by nebulization every 8 (eight) hours as needed for wheezing or shortness of breath (Patient not taking: Reported on 12/10/2024) 60 mL 3    nystatin (MYCOSTATIN) cream Apply topically 2 (two) times a day (Patient not taking: Reported on 12/10/2024) 30 g 3    OneTouch Delica Lancets 33G MISC Check blood sugars once daily. Please substitute with appropriate alternative as covered by patient's insurance. Dx: E11.65 (Patient not taking: Reported on 2025) 100 each 3    thiamine 100 MG tablet Take 1 tablet (100 mg total) by mouth daily (Patient not taking: Reported on 2025) 30 tablet 0    triamcinolone (KENALOG) 0.1 % ointment APPLY TO AFFECTED AREA TWICE A DAY (Patient not taking: Reported on 12/10/2024) 160 g 3     No current facility-administered medications on file prior to visit.      Social History     Tobacco Use    Smoking status: Former     Current packs/day: 0.00     Average packs/day: 3.0 packs/day for 30.0 years (90.0 ttl pk-yrs)     Types: Cigarettes     Start date: 10/22/1980     Quit date: 10/22/2010     Years since quittin.5    Smokeless tobacco: Former     Quit date: 2011    Tobacco comments:     quit approx. 9 years ago   Vaping Use    Vaping status: Never Used   Substance and Sexual Activity    Alcohol use: Never     Comment: n/a    Drug use: Yes     Types: Marijuana     Comment: smokes with girlfriend when anxious    Sexual activity: Yes     Partners: Female        Objective   There were no vitals taken for this visit.     Physical Exam  BP sitting on left: 104/60 with a heart rate of 72 and regular  Physical Exam: General:  No acute distress/morbidly obese, in wheelchair (bound)  Skin:  No acute rash  Eyes:  No scleral icterus and  noninjected  ENT:  Moist mucous membranes  Neck:  Supple, no jugular venous distention, trachea midline, overall appearance is normal  Chest:  Clear to auscultation except for very slight crackles left base but no wheezing  CVS:  Regular rate and rhythm, without a rub or gallops  Abdomen:  obese,Normal bowel sounds, soft and nontender and nondistended  Extremities:  No edema, and no cyanosis, no significant arthritic changes  Neuro:  No gross focality  Psych:  Alert and oriented and appropriate

## 2025-05-05 NOTE — TELEPHONE ENCOUNTER
Venessa langford [facility] called back.    She states that they never received the office notes.    Please resend.    [place]:  Fax [phone]

## 2025-05-05 NOTE — ASSESSMENT & PLAN NOTE
Given recent multiple events at this juncture observe continue off SGLT2 inhibitor  Orders:    CBC; Future    Basic metabolic panel; Future    Magnesium; Future    Comprehensive metabolic panel; Future    CBC; Future    Ferritin; Future    TIBC Panel (incl. Iron, TIBC, % Iron Saturation); Future    PTH, intact; Future    Protein / creatinine ratio, urine; Future    Magnesium; Future    Lipid Panel with Direct LDL reflex; Future    Vitamin D 25 hydroxy; Future

## 2025-05-05 NOTE — ASSESSMENT & PLAN NOTE
Doing well no changes meds are for cardiac purposes  Orders:    CBC; Future    Basic metabolic panel; Future    Magnesium; Future    Comprehensive metabolic panel; Future    CBC; Future    Ferritin; Future    TIBC Panel (incl. Iron, TIBC, % Iron Saturation); Future    PTH, intact; Future    Protein / creatinine ratio, urine; Future    Magnesium; Future    Lipid Panel with Direct LDL reflex; Future    Vitamin D 25 hydroxy; Future

## 2025-05-05 NOTE — ASSESSMENT & PLAN NOTE
At goal no changes  Orders:    CBC; Future    Basic metabolic panel; Future    Magnesium; Future    Comprehensive metabolic panel; Future    CBC; Future    Ferritin; Future    TIBC Panel (incl. Iron, TIBC, % Iron Saturation); Future    PTH, intact; Future    Protein / creatinine ratio, urine; Future    Magnesium; Future    Lipid Panel with Direct LDL reflex; Future    Vitamin D 25 hydroxy; Future

## 2025-05-06 ENCOUNTER — TELEPHONE (OUTPATIENT)
Dept: NEPHROLOGY | Facility: CLINIC | Age: 65
End: 2025-05-06

## 2025-05-06 DIAGNOSIS — N18.32 STAGE 3B CHRONIC KIDNEY DISEASE (HCC): ICD-10-CM

## 2025-05-06 DIAGNOSIS — I12.9 HYPERTENSIVE CHRONIC KIDNEY DISEASE WITH STAGE 1 THROUGH STAGE 4 CHRONIC KIDNEY DISEASE, OR UNSPECIFIED CHRONIC KIDNEY DISEASE: ICD-10-CM

## 2025-05-06 DIAGNOSIS — R80.1 PERSISTENT PROTEINURIA: Primary | ICD-10-CM

## 2025-05-06 NOTE — TELEPHONE ENCOUNTER
Spoke with patient's caregiver letting her know a request was sent to [facility] for blood draw for patient's appt on 5/12.  She expressed understanding and thanked us for the call.

## 2025-05-09 DIAGNOSIS — I48.0 PAROXYSMAL ATRIAL FIBRILLATION (HCC): Chronic | ICD-10-CM

## 2025-05-10 LAB
BASOPHILS # BLD AUTO: 32 CELLS/UL (ref 0–200)
BASOPHILS NFR BLD AUTO: 0.5 %
BUN SERPL-MCNC: 25 MG/DL (ref 7–25)
BUN/CREAT SERPL: 19 (CALC) (ref 6–22)
CALCIUM SERPL-MCNC: 9 MG/DL (ref 8.6–10.4)
CHLORIDE SERPL-SCNC: 99 MMOL/L (ref 98–110)
CO2 SERPL-SCNC: 40 MMOL/L (ref 20–32)
CREAT SERPL-MCNC: 1.33 MG/DL (ref 0.5–1.05)
CREAT UR-MCNC: 71 MG/DL (ref 20–275)
EOSINOPHIL # BLD AUTO: 88 CELLS/UL (ref 15–500)
EOSINOPHIL NFR BLD AUTO: 1.4 %
ERYTHROCYTE [DISTWIDTH] IN BLOOD BY AUTOMATED COUNT: 13 % (ref 11–15)
GFR/BSA.PRED SERPLBLD CYS-BASED-ARV: 44 ML/MIN/1.73M2
GLUCOSE SERPL-MCNC: 80 MG/DL (ref 65–99)
HCT VFR BLD AUTO: 31.3 % (ref 35–45)
HGB BLD-MCNC: 9.6 G/DL (ref 11.7–15.5)
LYMPHOCYTES # BLD AUTO: 1002 CELLS/UL (ref 850–3900)
LYMPHOCYTES NFR BLD AUTO: 15.9 %
MAGNESIUM SERPL-MCNC: 2 MG/DL (ref 1.5–2.5)
MCH RBC QN AUTO: 31.1 PG (ref 27–33)
MCHC RBC AUTO-ENTMCNC: 30.7 G/DL (ref 32–36)
MCV RBC AUTO: 101.3 FL (ref 80–100)
MONOCYTES # BLD AUTO: 441 CELLS/UL (ref 200–950)
MONOCYTES NFR BLD AUTO: 7 %
NEUTROPHILS # BLD AUTO: 4738 CELLS/UL (ref 1500–7800)
NEUTROPHILS NFR BLD AUTO: 75.2 %
PLATELET # BLD AUTO: 231 THOUSAND/UL (ref 140–400)
PMV BLD REES-ECKER: 9.6 FL (ref 7.5–12.5)
POTASSIUM SERPL-SCNC: 4.6 MMOL/L (ref 3.5–5.3)
PROT UR-MCNC: 32 MG/DL (ref 5–24)
PROT/CREAT UR: 0.45 MG/MG CREAT (ref 0.02–0.18)
PROT/CREAT UR: 451 MG/G CREAT (ref 24–184)
RBC # BLD AUTO: 3.09 MILLION/UL (ref 3.8–5.1)
SODIUM SERPL-SCNC: 142 MMOL/L (ref 135–146)
WBC # BLD AUTO: 6.3 THOUSAND/UL (ref 3.8–10.8)

## 2025-05-10 RX ORDER — METOPROLOL TARTRATE 50 MG
50 TABLET ORAL 2 TIMES DAILY
Qty: 180 TABLET | Refills: 3 | OUTPATIENT
Start: 2025-05-10

## 2025-05-13 ENCOUNTER — OFFICE VISIT (OUTPATIENT)
Dept: NEPHROLOGY | Facility: CLINIC | Age: 65
End: 2025-05-13
Payer: COMMERCIAL

## 2025-05-13 DIAGNOSIS — R80.1 PERSISTENT PROTEINURIA: ICD-10-CM

## 2025-05-13 DIAGNOSIS — N18.32 STAGE 3B CHRONIC KIDNEY DISEASE (HCC): ICD-10-CM

## 2025-05-13 DIAGNOSIS — I12.9 HYPERTENSIVE CHRONIC KIDNEY DISEASE WITH STAGE 1 THROUGH STAGE 4 CHRONIC KIDNEY DISEASE, OR UNSPECIFIED CHRONIC KIDNEY DISEASE: ICD-10-CM

## 2025-05-13 DIAGNOSIS — N18.32 ANEMIA IN STAGE 3B CHRONIC KIDNEY DISEASE  (HCC): ICD-10-CM

## 2025-05-13 DIAGNOSIS — E78.5 DYSLIPIDEMIA: ICD-10-CM

## 2025-05-13 DIAGNOSIS — E11.65 TYPE 2 DIABETES MELLITUS WITH HYPERGLYCEMIA, UNSPECIFIED WHETHER LONG TERM INSULIN USE (HCC): Primary | ICD-10-CM

## 2025-05-13 DIAGNOSIS — D63.1 ANEMIA IN STAGE 3B CHRONIC KIDNEY DISEASE  (HCC): ICD-10-CM

## 2025-05-13 PROCEDURE — G2211 COMPLEX E/M VISIT ADD ON: HCPCS | Performed by: INTERNAL MEDICINE

## 2025-05-13 PROCEDURE — 99214 OFFICE O/P EST MOD 30 MIN: CPT | Performed by: INTERNAL MEDICINE

## 2025-05-13 NOTE — LETTER
May 13, 2025     TULIO Finney  [address]  [address]  [facility] [address]    Patient: Adelina Soto   YOB: 1960   Date of Visit: 2025       Dear TULIO Torres:    Thank you for referring Adelina Soto to me for evaluation. Below are my notes for this consultation.    If you have questions, please do not hesitate to call me. I look forward to following your patient along with you.         Sincerely,        Delmar Winchester MD        CC: No Recipients    Delmar Winchester MD  2025 11:16 AM  Sign when Signing Visit  Name: Adelina Soto      : 1960      MRN: [id number]  Encounter Provider: Delmar Winchester MD  Encounter Date: 2025   Encounter department: [facility]  :  Assessment & Plan  Type 2 diabetes mellitus with hyperglycemia, unspecified whether long term insulin use (HCC)  Given recent multiple events at this juncture observe continue off SGLT2 inhibitor  Orders:  •  CBC; Future  •  Basic metabolic panel; Future  •  Magnesium; Future  •  Comprehensive metabolic panel; Future  •  CBC; Future  •  Ferritin; Future  •  TIBC Panel (incl. Iron, TIBC, % Iron Saturation); Future  •  PTH, intact; Future  •  Protein / creatinine ratio, urine; Future  •  Magnesium; Future  •  Lipid Panel with Direct LDL reflex; Future  •  Vitamin D 25 hydroxy; Future    Hypertensive chronic kidney disease with stage 1 through stage 4 chronic kidney disease, or unspecified chronic kidney disease  Doing well no changes meds are for cardiac purposes  Orders:  •  CBC; Future  •  Basic metabolic panel; Future  •  Magnesium; Future  •  Comprehensive metabolic panel; Future  •  CBC; Future  •  Ferritin; Future  •  TIBC Panel (incl. Iron, TIBC, % Iron Saturation); Future  •  PTH, intact; Future  •  Protein / creatinine ratio, urine; Future  •  Magnesium; Future  •  Lipid Panel with Direct LDL reflex; Future  •  Vitamin D 25 hydroxy; Future    Stage 3b  chronic kidney disease (HCC)  At baseline no changes    Persistent proteinuria  At goal no changes  Orders:  •  CBC; Future  •  Basic metabolic panel; Future  •  Magnesium; Future  •  Comprehensive metabolic panel; Future  •  CBC; Future  •  Ferritin; Future  •  TIBC Panel (incl. Iron, TIBC, % Iron Saturation); Future  •  PTH, intact; Future  •  Protein / creatinine ratio, urine; Future  •  Magnesium; Future  •  Lipid Panel with Direct LDL reflex; Future  •  Vitamin D 25 hydroxy; Future    Anemia in stage 3b chronic kidney disease  (HCC)  Stable hemoglobin but will check iron studies    Orders:  •  CBC; Future  •  Basic metabolic panel; Future  •  Magnesium; Future  •  Comprehensive metabolic panel; Future  •  CBC; Future  •  Ferritin; Future  •  TIBC Panel (incl. Iron, TIBC, % Iron Saturation); Future  •  PTH, intact; Future  •  Protein / creatinine ratio, urine; Future  •  Magnesium; Future  •  Lipid Panel with Direct LDL reflex; Future  •  Vitamin D 25 hydroxy; Future    Dyslipidemia  Centrally at goal no changes push diet and exercise as able  Orders:  •  CBC; Future  •  Basic metabolic panel; Future  •  Magnesium; Future  •  Comprehensive metabolic panel; Future  •  CBC; Future  •  Ferritin; Future  •  TIBC Panel (incl. Iron, TIBC, % Iron Saturation); Future  •  PTH, intact; Future  •  Protein / creatinine ratio, urine; Future  •  Magnesium; Future  •  Lipid Panel with Direct LDL reflex; Future  •  Vitamin D 25 hydroxy; Future        History of Present Illness  HPI  Adelina Soto is a 65 y.o. female who presents for CKD 3  Prolonged hospitalization November through December 2020 for please see below  The patient overall is feeling well.  Good appetite and fair energy  No fevers, chills, colds but does have a cough from asthma  No hematuria, dysuria, voiding symptoms or foamy urine  No gastrointestinal symptoms  No chest pain, ongoing shortness of breath related to asthma on breathing treatments, and no leg  swelling  No headaches, dizziness or lightheadedness unless she gets up quickly  Blood pressure medications:  Propranolol 20 mg twice a day    Renal pertinent medications:  Atorvastatin 10 mg daily  Vitamin D 1000 units daily  Iron every other day      Review of Systems  Please see HPI, otherwise the review of systems as completely reviewed with the patient are negative    Pertinent Medical History  1.  CKD stage 3.:  Etiology:  Right radical nephrectomy July 15, 2016 for renal cell CA/hypertensive nephrosclerosis/arteriolar nephrosclerosis/diabetic nephropathy/?  Morbid obesity with?  FSGS  Baseline creatinine:  new baseline appears to be from 1.8-as high as 2.4  Current creatinine: 1.33 below baseline!  From 5/9/2025  Urine protein creatinine ratio: 0.451 g at goal  UA microscopic no proteinuria, trace intact heme from 05/13/2020  Recommendations:  Treat hypertension-please see below  Treat dyslipidemia-please see below  Maintain proteinuria less than 1 g or as low as possible  Avoid nephrotoxic agents such as NSAIDs, patient counseled as such  Kidney smart referral refused     2.  Volume:  Current volume:  Euvolemic  Treatment:  No changes off diuretics     3.  Hypertension:   Home blood pressures:    None today        Goal blood pressure:  Less than 130/80  Recommendations:    push nonmedical regimen including weight loss, and any form of exercise patient can not tolerate; avoidance of salt; patient counseled as such  Medication changes today:  No changes as patient's blood pressure meds are for cardiac purposes       4.  Electrolytes:    Chronic metabolic alkalosis most likely from primary CO2 retention currently 40 most likely contraction alkalosis   : Mild hyperkalemia doing well at 4.6     5.  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  Dyslipidemia:  Goal LDL:   Less than 100  Current lipid profile: LDL 39/HDL 28/triglycerides 301 from 11/13/2024  Recommendations: No changes as patient is at goal just push diet and exercise     7.  Anemia:    -Current value 9.6 stable compared to February 2025  -iron studies: Low ferritin at 73 from 7/31/2024 replete every other day: She would strongly prefer avoiding iron infusion.  -B12 and folate deficiency being treated  -multiple myeloma was ruled out in January  -GI evaluation in January of 2018:   colonoscopy demonstrated internal/external hemorrhoids; also colonic polyps which were removed.  There was a suboptimal colonic prep.  Felt she may require repeat colonoscopy in 3-6 months. EGD demonstrated small hiatal hernia.   GI evaluated the patient December 2022 because of anemia EGD demonstrated gastric duodenal AVMs requiring APC, follow-up secondary endoscopy because of dropping hemoglobin; colonoscopy demonstrated no active bleeding  Capsule endoscopy was recommended never done    Further GI evaluation if warranted if an acute problem.           8.  Other problems:  Right radical nephrectomy for renal cell CA July 15, 2016  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 2, 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  Morbid obesity  Atrial fibrillation  Cellulitis of the left leg back in January treated with intravenous antibiotics  COPD on iron  EGD involving multiple joints  Hypothyroidism on supplement  History of gait dysfunction using motorized scooter to ambulate  Diabetes mellitus/diabetic neuropathy  Admission 8/2021 for abdominal pain-workup is CT/EGD/colonoscopy/push enteroscopy all negative except for gastritis-esophagitis; the patient's symptoms resolved spontaneously.  Patient with a ventral hernia which she deferred in terms of surgical revision, will follow-up with surgery.  Admission to [facility] 12/11/2022 with abdominal wall cellulitis/anemia status posttransfusion  Colonoscopy 11/30/2022 demonstrated  diverticula in the sigmoid colon otherwise normal colonoscopy  EGD with push enteroscopy was prematurely aborted secondary respiratory instability  Completed course of antibiotics including Keflex  Peak creatinine 2.19 on admission came down to 1.85  Admission December 3, 2022 with acute hypoxic hypercapnic respiratory failure: Felt to have multifocal pneumonia started on empiric antibiotics treated by pulmonary.  Also GI evaluation for anemia please see above  Admitted 12/9/2024: Seizure-like activity subsequently intubated transferred to [facility] negative HSV/ME/LP.  Treated for antibiotics for spiking fevers.  Was on pressor support as well.  EEG and MRI no acute findings  Went directly home  Propanolol instead of metoprolol for hyperthyroid she was also treated  Continues on Coumadin            GI health maintenance: 12/2/2022: Normal colonoscopy recommended repeat in 5 years which would be December 2027.            Medical History Reviewed by provider this encounter:     .  Past Medical History  Past Medical History:   Diagnosis Date   • Anemia    • Arthritis    • Cancer of kidney (HCC)    • Chronic kidney disease    • Diabetes mellitus (HCC)    • Disease of thyroid gland    • HLD (hyperlipidemia)    • Hypertension    • Ovarian cancer (HCC)    • Pneumonia      Past Surgical History:   Procedure Laterality Date   • CHOLECYSTECTOMY     • CT GUIDED PERC DRAINAGE CATHETER PLACEMENT  5/16/2016   • FL LUMBAR PUNCTURE DIAGNOSTIC  11/19/2024   • GALLBLADDER SURGERY  05/01/2004   • HYSTERECTOMY  06/01/2018   • NEPHRECTOMY Right 08/02/2018     Family History   Problem Relation Age of Onset   • No Known Problems Mother    • No Known Problems Father       reports that she quit smoking about 14 years ago. Her smoking use included cigarettes. She started smoking about 44 years ago. She has a 90 pack-year smoking history. She quit smokeless tobacco use about 13 years ago. She reports current drug use. Drug:  Marijuana. She reports that she does not drink alcohol.  Current Outpatient Medications   Medication Instructions   • albuterol (PROVENTIL HFA,VENTOLIN HFA) 90 mcg/act inhaler INHALE 1 PUFF EVERY 4 HOURS AS NEEDED FOR WHEEZING.   • albuterol 2.5 mg, Nebulization, Every 6 hours PRN   • atorvastatin (LIPITOR) 10 mg, Oral, Daily with dinner   • atorvastatin (LIPITOR) 10 mg, Oral, Daily   • Blood Glucose Monitoring Suppl (Accu-Chek Guide) w/Device KIT Does not apply, Daily before breakfast   • cholecalciferol (VITAMIN D3) 400 Units, Oral, Daily   • famotidine (PEPCID) 20 mg, Oral, Daily   • ferrous sulfate 325 mg, Oral, Daily with breakfast, Every other day   • furosemide (LASIX) 40 mg, Oral, Daily   • glucose blood (OneTouch Verio) test strip 1 each, Other, Daily before breakfast, Use as instructed   • ipratropium (ATROVENT) 0.02 % nebulizer solution USE 1 VIAL IN NEBULIZER 4 TIMES DAILY   • ipratropium-albuterol (DUO-NEB) 0.5-2.5 mg/3 mL nebulizer solution 3 mL, Nebulization, Every 8 hours PRN   • levETIRAcetam (KEPPRA) 750 mg, Oral, Every 12 hours scheduled   • levothyroxine 88 mcg, Oral, Daily (early morning)   • LORazepam (ATIVAN) 0.5 mg, Oral, 2 times daily PRN   • nystatin (MYCOSTATIN) cream Topical, 2 times daily   • OneTouch Delica Lancets 33G MISC Check blood sugars once daily. Please substitute with appropriate alternative as covered by patient's insurance. Dx: E11.65   • oxygen 2 L/min, Inhalation, Continuous, Via nasal cannula   • pantoprazole (PROTONIX) 40 mg, Oral, Daily   • propranolol (INDERAL) 20 mg, Oral, Every 12 hours   • sertraline (ZOLOFT) 50 mg, Oral, Daily   • thiamine 100 mg, Oral, Daily   • triamcinolone (KENALOG) 0.1 % ointment 1 Application, Topical, 2 times daily, To affected area   • valproic acid (DEPAKENE) 500 mg, Oral, Every 6 hours scheduled   • warfarin (COUMADIN) 1 mg, Oral, Daily (warfarin), Monday,Wednesday, Friday 3 mg Sunday, Saturday, Tuesday, Thursday 1 mg   • warfarin  "(COUMADIN) 3 mg, Oral, Daily     Allergies   Allergen Reactions   • Nystatin Dermatitis     Per SO shaun it makes her \"more sore\"      Current Outpatient Medications on File Prior to Visit   Medication Sig Dispense Refill   • albuterol (2.5 mg/3 mL) 0.083 % nebulizer solution Take 3 mL (2.5 mg total) by nebulization every 6 (six) hours as needed for wheezing or shortness of breath 30 mL 0   • albuterol (PROVENTIL HFA,VENTOLIN HFA) 90 mcg/act inhaler INHALE 1 PUFF EVERY 4 HOURS AS NEEDED FOR WHEEZING. 6.7 g 5   • atorvastatin (LIPITOR) 10 mg tablet TAKE 1 TABLET BY MOUTH EVERY DAY 90 tablet 1   • cholecalciferol (VITAMIN D3) 400 units tablet Take 1 tablet (400 Units total) by mouth daily (Patient taking differently: Take 400 Units by mouth daily Pt takes 1000 iu daily) 100 tablet 1   • famotidine (PEPCID) 20 mg tablet TAKE 1 TABLET BY MOUTH EVERY DAY 90 tablet 1   • ferrous sulfate 325 (65 Fe) mg tablet Take 1 tablet (325 mg total) by mouth daily with breakfast Every other day (Patient taking differently: Take 325 mg by mouth every other day Every other day) 45 tablet 1   • ipratropium (ATROVENT) 0.02 % nebulizer solution USE 1 VIAL IN NEBULIZER 4 TIMES DAILY 300 mL 5   • levETIRAcetam (KEPPRA) 750 mg tablet Take 1 tablet (750 mg total) by mouth every 12 (twelve) hours 90 tablet 3   • levothyroxine 88 mcg tablet Take 1 tablet (88 mcg total) by mouth daily in the early morning 90 tablet 2   • LORazepam (ATIVAN) 0.5 mg tablet Take 1 tablet (0.5 mg total) by mouth 2 (two) times a day as needed for anxiety 60 tablet 2   • oxygen gas Inhale 2 L/min continuous. Via nasal cannula     • pantoprazole (PROTONIX) 40 mg tablet Take 1 tablet (40 mg total) by mouth daily 90 tablet 3   • propranolol (INDERAL) 20 mg tablet Take 1 tablet (20 mg total) by mouth every 12 (twelve) hours 180 tablet 2   • sertraline (ZOLOFT) 50 mg tablet TAKE 1 TABLET BY MOUTH EVERY DAY 90 tablet 1   • valproic acid (DEPAKENE) 250 MG/5ML soln TAKE 10 ML " (500 MG TOTAL) BY MOUTH EVERY 6 (SIX) HOURS 946 mL 3   • warfarin (Coumadin) 1 mg tablet Take 1 tablet (1 mg total) by mouth daily Monday,Wednesday, Friday 3 mg Sunday, Saturday, Tuesday, Thursday 1 mg 120 tablet 0   • warfarin (COUMADIN) 3 mg tablet TAKE 1 TABLET BY MOUTH DAILY 90 tablet 3   • atorvastatin (LIPITOR) 10 mg tablet Take 1 tablet (10 mg total) by mouth daily with dinner (Patient not taking: Reported on 5/13/2025) 30 tablet 0   • Blood Glucose Monitoring Suppl (Accu-Chek Guide) w/Device KIT Use daily before breakfast (Patient not taking: Reported on 4/29/2025) 1 kit 0   • furosemide (LASIX) 40 mg tablet Take 1 tablet (40 mg total) by mouth daily (Patient not taking: Reported on 12/10/2024) 90 tablet 3   • glucose blood (OneTouch Verio) test strip Use 1 each daily before breakfast Use as instructed (Patient not taking: Reported on 4/29/2025) 100 strip 1   • ipratropium-albuterol (DUO-NEB) 0.5-2.5 mg/3 mL nebulizer solution Take 3 mL by nebulization every 8 (eight) hours as needed for wheezing or shortness of breath (Patient not taking: Reported on 12/10/2024) 60 mL 3   • nystatin (MYCOSTATIN) cream Apply topically 2 (two) times a day (Patient not taking: Reported on 12/10/2024) 30 g 3   • OneTouch Delica Lancets 33G MISC Check blood sugars once daily. Please substitute with appropriate alternative as covered by patient's insurance. Dx: E11.65 (Patient not taking: Reported on 4/29/2025) 100 each 3   • thiamine 100 MG tablet Take 1 tablet (100 mg total) by mouth daily (Patient not taking: Reported on 4/29/2025) 30 tablet 0   • triamcinolone (KENALOG) 0.1 % ointment APPLY TO AFFECTED AREA TWICE A DAY (Patient not taking: Reported on 12/10/2024) 160 g 3     No current facility-administered medications on file prior to visit.      Social History     Tobacco Use   • Smoking status: Former     Current packs/day: 0.00     Average packs/day: 3.0 packs/day for 30.0 years (90.0 ttl pk-yrs)     Types: Cigarettes      Start date: 10/22/1980     Quit date: 10/22/2010     Years since quittin.5   • Smokeless tobacco: Former     Quit date: 2011   • Tobacco comments:     quit approx. 9 years ago   Vaping Use   • Vaping status: Never Used   Substance and Sexual Activity   • Alcohol use: Never     Comment: n/a   • Drug use: Yes     Types: Marijuana     Comment: smokes with girlfriend when anxious   • Sexual activity: Yes     Partners: Female        Objective  There were no vitals taken for this visit.     Physical Exam  BP sitting on left: 104/60 with a heart rate of 72 and regular  Physical Exam: General:  No acute distress/morbidly obese, in wheelchair (bound)  Skin:  No acute rash  Eyes:  No scleral icterus and noninjected  ENT:  Moist mucous membranes  Neck:  Supple, no jugular venous distention, trachea midline, overall appearance is normal  Chest:  Clear to auscultation except for very slight crackles left base but no wheezing  CVS:  Regular rate and rhythm, without a rub or gallops  Abdomen:  obese,Normal bowel sounds, soft and nontender and nondistended  Extremities:  No edema, and no cyanosis, no significant arthritic changes  Neuro:  No gross focality  Psych:  Alert and oriented and appropriate

## 2025-05-13 NOTE — PATIENT INSTRUCTIONS
Visit summary:  - Overall kidney labs look good  - Your blood pressure is low normal and you are on medications for heart purposes        1. Medication changes today:  No medication changes today    2.  General instructions:  Continue to avoid salt  Remain as active as you are able to  Continue to try to lose weight        3.  Please take 1 week a blood pressure readings in 3 months then again in 6 months    AS FOLLOWS  MORNING AND EVENING, SITTING AND STANDING as follows:  TAKE THE MORNING READINGS BEFORE ANY MEDICATIONS AND WHEN YOU ARE RELAXED FOR SEVERAL MINUTES  TAKE THE EVENING READINGS:  BETWEEN 7-10 P.M.; PRIOR TO ANY MEDICATIONS; AT LEAST IN OUR  FROM DINNER; AND CERTAINLY AFTER RELAXING FOR A FEW MINUTES  PLEASE INCLUDE HEART RATE WITH YOUR BLOOD PRESSURE READINGS  When taking standing readings, keep your arm supported at heart level and not dangling  Make sure you are sitting with your back supported and feet on the ground and do not cross your legs or feet  Make sure you have not taken any coffee or caffeine products or exercised or smoke cigarettes at least 30 minutes before taking your blood pressure  Then please mail these readings into the office      4.  In 3 months:  Please go for nonfasting lab work but in the morning  Please take 1 week a blood pressure readings as outlined above and mail those into the office      5.  Follow-up in 6 months  Please bring in 1 week a blood pressure readings morning evening, sitting and standing is outlined above  PLEASE BRING AN YOUR BLOOD PRESSURE MACHINE TO CORRELATE WITH THE OFFICE MACHINE AT THIS NEXT SCHEDULED VISIT  Please go for fasting lab work 1-2 weeks prior to your appointment      6.  General non medical recommendations:  AVOID SALT BUT NOT ADDING AN READING LABELS TO MAKE SURE THERE IS LOW-SALT IN THE FOOD THAT YOU ARE EATING  Goal is less than 2 g of sodium intake or less than 5 g of sodium chloride intake per day    Avoid nonsteroidal  anti-inflammatory drugs such as Naprosyn, ibuprofen, Aleve, Advil, Celebrex, Meloxicam (Mobic) etc.  You can use Tylenol as needed if you do not have any liver condition to be concerned about    Avoid medications such as Sudafed or decongestants and antihistamines that contained the D component which is the decongestant.  You can take antihistamines without the decongestant or D component.    Try to avoid medications such as pantoprazole or  Protonix/Nexium or Esomeprazole)/Prilosec or omeprazole/Prevacid or lansoprazole/AcipHex or Rabeprazole.  If you are able to, use Pepcid as this is safer for your kidneys.    Try to exercise at least 30 minutes 3 days a week to begin with with an ultimate goal of 5 days a week for at least 30 minutes    Please do not drink more than 2 glasses of alcohol/wine on a daily basis as this may contribute to your high blood pressure.

## 2025-05-20 ENCOUNTER — OFFICE VISIT (OUTPATIENT)
Dept: FAMILY MEDICINE CLINIC | Facility: CLINIC | Age: 65
End: 2025-05-20
Payer: COMMERCIAL

## 2025-05-20 ENCOUNTER — HOME HEALTH ADMISSION (OUTPATIENT)
Dept: HOME HEALTH SERVICES | Facility: HOME HEALTHCARE | Age: 65
End: 2025-05-20
Payer: COMMERCIAL

## 2025-05-20 VITALS
HEART RATE: 61 BPM | DIASTOLIC BLOOD PRESSURE: 74 MMHG | TEMPERATURE: 96.5 F | OXYGEN SATURATION: 95 % | BODY MASS INDEX: 45.52 KG/M2 | HEIGHT: 64 IN | SYSTOLIC BLOOD PRESSURE: 118 MMHG | RESPIRATION RATE: 17 BRPM

## 2025-05-20 DIAGNOSIS — Z99.3 DEPENDENCE ON WHEELCHAIR: ICD-10-CM

## 2025-05-20 DIAGNOSIS — J96.11 CHRONIC RESPIRATORY FAILURE WITH HYPOXIA (HCC): ICD-10-CM

## 2025-05-20 DIAGNOSIS — D63.1 ANEMIA IN STAGE 3B CHRONIC KIDNEY DISEASE  (HCC): ICD-10-CM

## 2025-05-20 DIAGNOSIS — I48.0 PAROXYSMAL ATRIAL FIBRILLATION (HCC): Chronic | ICD-10-CM

## 2025-05-20 DIAGNOSIS — M62.81 GENERALIZED MUSCLE WEAKNESS: ICD-10-CM

## 2025-05-20 DIAGNOSIS — I12.9 HYPERTENSIVE CHRONIC KIDNEY DISEASE WITH STAGE 1 THROUGH STAGE 4 CHRONIC KIDNEY DISEASE, OR UNSPECIFIED CHRONIC KIDNEY DISEASE: ICD-10-CM

## 2025-05-20 DIAGNOSIS — R26.2 AMBULATORY DYSFUNCTION: ICD-10-CM

## 2025-05-20 DIAGNOSIS — D68.9 COAGULOPATHY (HCC): ICD-10-CM

## 2025-05-20 DIAGNOSIS — J44.89 COPD WITH ASTHMA (HCC): Chronic | ICD-10-CM

## 2025-05-20 DIAGNOSIS — E11.9 TYPE 2 DIABETES MELLITUS IN REMISSION (HCC): Primary | ICD-10-CM

## 2025-05-20 DIAGNOSIS — N18.32 ANEMIA IN STAGE 3B CHRONIC KIDNEY DISEASE  (HCC): ICD-10-CM

## 2025-05-20 DIAGNOSIS — Z99.81 DEPENDENCE ON SUPPLEMENTAL OXYGEN: ICD-10-CM

## 2025-05-20 PROBLEM — R78.81 BACTEREMIA DUE TO STAPHYLOCOCCUS: Status: RESOLVED | Noted: 2024-11-25 | Resolved: 2025-05-20

## 2025-05-20 PROBLEM — E65 PANNICULUS: Status: RESOLVED | Noted: 2022-11-20 | Resolved: 2025-05-20

## 2025-05-20 PROBLEM — B95.8 BACTEREMIA DUE TO STAPHYLOCOCCUS: Status: RESOLVED | Noted: 2024-11-25 | Resolved: 2025-05-20

## 2025-05-20 PROBLEM — M79.3 PANNICULITIS: Status: RESOLVED | Noted: 2022-11-21 | Resolved: 2025-05-20

## 2025-05-20 PROBLEM — R82.71 BACTERIURIA: Status: RESOLVED | Noted: 2024-11-15 | Resolved: 2025-05-20

## 2025-05-20 PROBLEM — S70.12XA THIGH HEMATOMA, LEFT, INITIAL ENCOUNTER: Status: RESOLVED | Noted: 2024-12-02 | Resolved: 2025-05-20

## 2025-05-20 PROBLEM — L03.90 CELLULITIS: Status: RESOLVED | Noted: 2020-09-10 | Resolved: 2025-05-20

## 2025-05-20 LAB — SL AMB POCT HEMOGLOBIN AIC: 4.9 (ref ?–6.5)

## 2025-05-20 PROCEDURE — 99214 OFFICE O/P EST MOD 30 MIN: CPT | Performed by: NURSE PRACTITIONER

## 2025-05-20 PROCEDURE — 83036 HEMOGLOBIN GLYCOSYLATED A1C: CPT | Performed by: NURSE PRACTITIONER

## 2025-05-20 NOTE — ASSESSMENT & PLAN NOTE
Lab Results   Component Value Date    HGBA1C 4.9 05/20/2025       Orders:    POCT hemoglobin A1c

## 2025-05-20 NOTE — ASSESSMENT & PLAN NOTE
Lab Results   Component Value Date    EGFR 44 (L) 05/09/2025    EGFR 57 (L) 02/24/2025    EGFR 44 12/09/2024    CREATININE 1.33 (H) 05/09/2025    CREATININE 1.08 (H) 02/24/2025    CREATININE 1.26 12/09/2024

## 2025-05-20 NOTE — PROGRESS NOTES
Name: Adelina Soto      : 1960      MRN: [id number]  Encounter Provider: TULIO Beaver  Encounter Date: 2025   Encounter department: [facility]  :  Assessment & Plan  Type 2 diabetes mellitus in remission (HCC)    Lab Results   Component Value Date    HGBA1C 4.9 2025       Orders:    POCT hemoglobin A1c    Paroxysmal atrial fibrillation ([facility])  Due for INR   Lab slip faxed to [employer] home draw       Chronic respiratory failure with hypoxia ([facility])  Patient continues to require home oxygen  Today she was wearing oxygen.  SPO2 95% on 4 L  I removed her oxygen for 10 minutes to ascertain her room air SpO2.  After 10 minutes SpO2 on room air 85%-this supports continued need for home oxygen  She is bedbound/wheelchair-bound.  Significantly deconditioned.  Unable to stand on her own.  I am unable to do a 6-minute walk to ascertain ambulatory oxygen needs.  For this reason I placed referral to home health for physical therapy for strength training and reconditioning exercises    Orders:    Referral to [facility]; Future    Hypertensive chronic kidney disease with stage 1 through stage 4 chronic kidney disease, or unspecified chronic kidney disease  Lab Results   Component Value Date    EGFR 44 (L) 2025    EGFR 57 (L) 2025    EGFR 44 2024    CREATININE 1.33 (H) 2025    CREATININE 1.08 (H) 2025    CREATININE 1.26 2024     Managed by nephrology         Anemia in stage 3b chronic kidney disease  ([facility])  Lab Results   Component Value Date    EGFR 44 (L) 2025    EGFR 57 (L) 2025    EGFR 44 2024    CREATININE 1.33 (H) 2025    CREATININE 1.08 (H) 2025    CREATININE 1.26 2024            COPD with asthma ([facility])         Generalized muscle weakness    Orders:    Referral to [facility]; Future    Ambulatory dysfunction    Orders:    Referral to [facility];  Future    Dependence on wheelchair    Orders:    Referral to Home Health- [facility]; Future    Dependence on supplemental oxygen    Orders:    Referral to Home Health- [facility]; Future      Patient clinically stable at this time.  Cont with current plan of care.   RTO as recommended and PRN     History of Present Illness   Here for chronic condition follow up    Uses motorized wheelchair   Her caregiver, Constanza, is accompanying her.    Adelina spent prolonged period in [facility] in November 2024 with refractory seizures    Since home she has been dependent on her caregiver for care.  Her extremities are very weak.  She is unable to stand or walk.  She is requesting referral to home health for physical therapy-she would like to try to regain strength in her legs     Recent labs performed for nephrology. CKD, cbc stable    Caregiver endorses that she self discontinued her Depakene as she felt there was a change in mental status.  She notified the neurologist as well.  I advised that this may lead to breakthrough seizures due to inadequate antiepileptic protection.  She seems to not feel she needs this medication.  She advised that she has not had a seizure since home.    Has no complaint of today    Due for A1c, inr    Review of Systems   Constitutional:  Positive for fatigue. Negative for fever.   HENT:  Negative for trouble swallowing.    Respiratory:  Negative for cough and shortness of breath.    Cardiovascular:  Positive for leg swelling. Negative for chest pain and palpitations.   Gastrointestinal:  Negative for abdominal pain and blood in stool.   Genitourinary:  Negative for hematuria.   Musculoskeletal:  Positive for gait problem.   Skin:  Positive for wound.   Neurological:  Positive for weakness. Negative for dizziness.   Psychiatric/Behavioral:  Negative for confusion and sleep disturbance.        Objective   /74 (BP Location: Left arm, Patient Position: Sitting, Cuff Size:  "Large)   Pulse 61   Temp (!) 96.5 °F (35.8 °C) (Temporal)   Resp 17   Ht 5' 4\" (1.626 m)   SpO2 95% Comment: room air sat after 10min off oxygen-> 85 %  PF (!) 4 L/min   BMI 45.52 kg/m²      Physical Exam  Vitals and nursing note reviewed.   Constitutional:       General: She is not in acute distress.     Appearance: Normal appearance. She is obese.      Comments: Patient arrives in a motorized wheelchair    Accompanied by caregiver, Constanza    Chronically ill appearing     Cardiovascular:      Rate and Rhythm: Normal rate. Rhythm irregular.      Pulses: Normal pulses.      Heart sounds: Murmur heard.   Pulmonary:      Effort: Pulmonary effort is normal.      Breath sounds: Normal breath sounds.     Musculoskeletal:      Right lower leg: Edema present.      Left lower leg: Edema present.     Skin:     General: Skin is warm and dry.      Coloration: Skin is not pale.     Neurological:      General: No focal deficit present.      Mental Status: She is alert. Mental status is at baseline.      Motor: Weakness present.      Gait: Gait abnormal.     Psychiatric:         Mood and Affect: Mood normal.         "

## 2025-05-20 NOTE — ASSESSMENT & PLAN NOTE
Patient continues to require home oxygen  Today she was wearing oxygen.  SPO2 95% on 4 L  I removed her oxygen for 10 minutes to ascertain her room air SpO2.  After 10 minutes SpO2 on room air 85%-this supports continued need for home oxygen  She is bedbound/wheelchair-bound.  Significantly deconditioned.  Unable to stand on her own.  I am unable to do a 6-minute walk to ascertain ambulatory oxygen needs.  For this reason I placed referral to home health for physical therapy for strength training and reconditioning exercises    Orders:    Referral to Home Health- [facility]; Future

## 2025-05-20 NOTE — ASSESSMENT & PLAN NOTE
Lab Results   Component Value Date    EGFR 44 (L) 05/09/2025    EGFR 57 (L) 02/24/2025    EGFR 44 12/09/2024    CREATININE 1.33 (H) 05/09/2025    CREATININE 1.08 (H) 02/24/2025    CREATININE 1.26 12/09/2024     Managed by nephrology

## 2025-05-21 ENCOUNTER — HOME CARE VISIT (OUTPATIENT)
Dept: HOME HEALTH SERVICES | Facility: HOME HEALTHCARE | Age: 65
End: 2025-05-21
Attending: NURSE PRACTITIONER
Payer: COMMERCIAL

## 2025-05-21 VITALS — HEART RATE: 80 BPM | DIASTOLIC BLOOD PRESSURE: 70 MMHG | SYSTOLIC BLOOD PRESSURE: 118 MMHG | OXYGEN SATURATION: 99 %

## 2025-05-21 PROCEDURE — G0151 HHCP-SERV OF PT,EA 15 MIN: HCPCS

## 2025-05-22 ENCOUNTER — RESULTS FOLLOW-UP (OUTPATIENT)
Dept: FAMILY MEDICINE CLINIC | Facility: CLINIC | Age: 65
End: 2025-05-22

## 2025-05-22 ENCOUNTER — ANTICOAG VISIT (OUTPATIENT)
Dept: FAMILY MEDICINE CLINIC | Facility: CLINIC | Age: 65
End: 2025-05-22

## 2025-05-22 LAB — INR PPP: 1.3 (ref 0.85–1.19)

## 2025-05-22 NOTE — TELEPHONE ENCOUNTER
----- Message from TULIO Finney sent at 5/22/2025 12:45 PM EDT -----  INR very low. 1.3  Please advise Constazna to administer 6 mg today, tomorrow, Saturday.   New dosing 3mg daily except 6 mg on Thursday. INR in 10 days  ----- Message -----  From: [place] Lab Results In  Sent: 5/22/2025   8:00 AM EDT  To: TULIO Finney

## 2025-05-22 NOTE — TELEPHONE ENCOUNTER
Spoke with Constanza and made aware of INR result as well as dosing instructions. Patient needs an INR order for next draw to [facility]. Flowsheet updated.

## 2025-05-22 NOTE — PROGRESS NOTES
INR 1.3 Patient to take 6 mg of coumadin today, tomorrow and Saturday. Then start new dosing schedule of 3 mg daily except for 6 mg on Thursdays. Patient to repeat INR in 10 days.

## 2025-05-23 ENCOUNTER — HOME CARE VISIT (OUTPATIENT)
Dept: HOME HEALTH SERVICES | Facility: HOME HEALTHCARE | Age: 65
End: 2025-05-23
Payer: COMMERCIAL

## 2025-05-23 VITALS — OXYGEN SATURATION: 98 % | HEART RATE: 72 BPM

## 2025-05-23 PROCEDURE — G0151 HHCP-SERV OF PT,EA 15 MIN: HCPCS

## 2025-05-26 ENCOUNTER — HOSPITAL ENCOUNTER (INPATIENT)
Facility: HOSPITAL | Age: 65
LOS: 1 days | Discharge: HOME HEALTH | DRG: 189 | End: 2025-05-27
Attending: EMERGENCY MEDICINE | Admitting: INTERNAL MEDICINE
Payer: COMMERCIAL

## 2025-05-26 ENCOUNTER — APPOINTMENT (EMERGENCY)
Dept: CT IMAGING | Facility: HOSPITAL | Age: 65
DRG: 189 | End: 2025-05-26
Payer: COMMERCIAL

## 2025-05-26 ENCOUNTER — APPOINTMENT (EMERGENCY)
Dept: RADIOLOGY | Facility: HOSPITAL | Age: 65
DRG: 189 | End: 2025-05-26
Payer: COMMERCIAL

## 2025-05-26 DIAGNOSIS — J96.12 CHRONIC RESPIRATORY FAILURE WITH HYPERCAPNIA (HCC): ICD-10-CM

## 2025-05-26 DIAGNOSIS — J96.11 CHRONIC RESPIRATORY FAILURE WITH HYPOXIA (HCC): ICD-10-CM

## 2025-05-26 DIAGNOSIS — Z71.89 GOALS OF CARE, COUNSELING/DISCUSSION: ICD-10-CM

## 2025-05-26 DIAGNOSIS — J44.1 COPD WITH ACUTE EXACERBATION (HCC): ICD-10-CM

## 2025-05-26 DIAGNOSIS — G93.41 ACUTE METABOLIC ENCEPHALOPATHY: ICD-10-CM

## 2025-05-26 DIAGNOSIS — I63.9 STROKE (CEREBRUM) (HCC): ICD-10-CM

## 2025-05-26 DIAGNOSIS — R29.90 STROKE-LIKE SYMPTOMS: ICD-10-CM

## 2025-05-26 DIAGNOSIS — J96.92 HYPERCAPNIC RESPIRATORY FAILURE (HCC): Primary | ICD-10-CM

## 2025-05-26 PROBLEM — J96.02 ACUTE HYPERCAPNIC RESPIRATORY FAILURE (HCC): Status: ACTIVE | Noted: 2025-05-26

## 2025-05-26 LAB
2HR DELTA HS TROPONIN: 3 NG/L
4HR DELTA HS TROPONIN: 9 NG/L
ALBUMIN SERPL BCG-MCNC: 3.6 G/DL (ref 3.5–5)
ALP SERPL-CCNC: 132 U/L (ref 34–104)
ALT SERPL W P-5'-P-CCNC: 11 U/L (ref 7–52)
ANION GAP SERPL CALCULATED.3IONS-SCNC: 4 MMOL/L (ref 4–13)
APTT PPP: 48 SECONDS (ref 23–34)
AST SERPL W P-5'-P-CCNC: 19 U/L (ref 13–39)
BACTERIA UR QL AUTO: ABNORMAL /HPF
BASE EX.OXY STD BLDV CALC-SCNC: 92.3 % (ref 60–80)
BASE EX.OXY STD BLDV CALC-SCNC: 95.8 % (ref 60–80)
BASE EX.OXY STD BLDV CALC-SCNC: 96.3 % (ref 60–80)
BASE EXCESS BLDV CALC-SCNC: 0.4 MMOL/L
BASE EXCESS BLDV CALC-SCNC: 1.8 MMOL/L
BASE EXCESS BLDV CALC-SCNC: 3.2 MMOL/L
BASOPHILS # BLD AUTO: 0.04 THOUSANDS/ÂΜL (ref 0–0.1)
BASOPHILS NFR BLD AUTO: 0 % (ref 0–1)
BILIRUB SERPL-MCNC: 0.45 MG/DL (ref 0.2–1)
BILIRUB UR QL STRIP: NEGATIVE
BUN SERPL-MCNC: 23 MG/DL (ref 5–25)
CALCIUM SERPL-MCNC: 9.8 MG/DL (ref 8.4–10.2)
CARDIAC TROPONIN I PNL SERPL HS: 15 NG/L (ref ?–50)
CARDIAC TROPONIN I PNL SERPL HS: 18 NG/L (ref ?–50)
CARDIAC TROPONIN I PNL SERPL HS: 24 NG/L (ref ?–50)
CHLORIDE SERPL-SCNC: 95 MMOL/L (ref 96–108)
CLARITY UR: ABNORMAL
CO2 SERPL-SCNC: 35 MMOL/L (ref 21–32)
COLOR UR: YELLOW
CREAT SERPL-MCNC: 1.36 MG/DL (ref 0.6–1.3)
EOSINOPHIL # BLD AUTO: 0.04 THOUSAND/ÂΜL (ref 0–0.61)
EOSINOPHIL NFR BLD AUTO: 0 % (ref 0–6)
ERYTHROCYTE [DISTWIDTH] IN BLOOD BY AUTOMATED COUNT: 14.3 % (ref 11.6–15.1)
FLUAV AG UPPER RESP QL IA.RAPID: NEGATIVE
FLUBV AG UPPER RESP QL IA.RAPID: NEGATIVE
GFR SERPL CREATININE-BSD FRML MDRD: 40 ML/MIN/1.73SQ M
GLUCOSE SERPL-MCNC: 159 MG/DL (ref 65–140)
GLUCOSE SERPL-MCNC: 164 MG/DL (ref 65–140)
GLUCOSE SERPL-MCNC: 166 MG/DL (ref 65–140)
GLUCOSE UR STRIP-MCNC: NEGATIVE MG/DL
HCO3 BLDV-SCNC: 30.7 MMOL/L (ref 24–30)
HCO3 BLDV-SCNC: 30.7 MMOL/L (ref 24–30)
HCO3 BLDV-SCNC: 32.9 MMOL/L (ref 24–30)
HCT VFR BLD AUTO: 37 % (ref 34.8–46.1)
HGB BLD-MCNC: 11 G/DL (ref 11.5–15.4)
HGB UR QL STRIP.AUTO: ABNORMAL
IMM GRANULOCYTES # BLD AUTO: 0.13 THOUSAND/UL (ref 0–0.2)
IMM GRANULOCYTES NFR BLD AUTO: 1 % (ref 0–2)
INR PPP: 2.09 (ref 0.85–1.19)
KETONES UR STRIP-MCNC: NEGATIVE MG/DL
LACTATE SERPL-SCNC: 1.2 MMOL/L (ref 0.5–2)
LEUKOCYTE ESTERASE UR QL STRIP: NEGATIVE
LYMPHOCYTES # BLD AUTO: 0.77 THOUSANDS/ÂΜL (ref 0.6–4.47)
LYMPHOCYTES NFR BLD AUTO: 6 % (ref 14–44)
MAGNESIUM SERPL-MCNC: 1.8 MG/DL (ref 1.9–2.7)
MCH RBC QN AUTO: 31.8 PG (ref 26.8–34.3)
MCHC RBC AUTO-ENTMCNC: 29.7 G/DL (ref 31.4–37.4)
MCV RBC AUTO: 107 FL (ref 82–98)
MONOCYTES # BLD AUTO: 0.72 THOUSAND/ÂΜL (ref 0.17–1.22)
MONOCYTES NFR BLD AUTO: 6 % (ref 4–12)
MUCOUS THREADS UR QL AUTO: ABNORMAL
NEUTROPHILS # BLD AUTO: 10.47 THOUSANDS/ÂΜL (ref 1.85–7.62)
NEUTS SEG NFR BLD AUTO: 87 % (ref 43–75)
NITRITE UR QL STRIP: NEGATIVE
NON-SQ EPI CELLS URNS QL MICRO: ABNORMAL /HPF
NRBC BLD AUTO-RTO: 0 /100 WBCS
O2 CT BLDV-SCNC: 15.7 ML/DL
O2 CT BLDV-SCNC: 16.4 ML/DL
O2 CT BLDV-SCNC: 17 ML/DL
PCO2 BLDV: 61.4 MM HG (ref 42–50)
PCO2 BLDV: 84.2 MM HG (ref 42–50)
PCO2 BLDV: 93.1 MM HG (ref 42–50)
PH BLDV: 7.17 [PH] (ref 7.3–7.4)
PH BLDV: 7.18 [PH] (ref 7.3–7.4)
PH BLDV: 7.32 [PH] (ref 7.3–7.4)
PH UR STRIP.AUTO: 7.5 [PH]
PLATELET # BLD AUTO: 280 THOUSANDS/UL (ref 149–390)
PMV BLD AUTO: 9.2 FL (ref 8.9–12.7)
PO2 BLDV: 161.4 MM HG (ref 35–45)
PO2 BLDV: 183 MM HG (ref 35–45)
PO2 BLDV: 87.8 MM HG (ref 35–45)
POTASSIUM SERPL-SCNC: 4.4 MMOL/L (ref 3.5–5.3)
PROT SERPL-MCNC: 8 G/DL (ref 6.4–8.4)
PROT UR STRIP-MCNC: ABNORMAL MG/DL
PROTHROMBIN TIME: 23.7 SECONDS (ref 12.3–15)
RBC # BLD AUTO: 3.46 MILLION/UL (ref 3.81–5.12)
RBC #/AREA URNS AUTO: ABNORMAL /HPF
SARS-COV+SARS-COV-2 AG RESP QL IA.RAPID: NEGATIVE
SODIUM SERPL-SCNC: 134 MMOL/L (ref 135–147)
SP GR UR STRIP.AUTO: 1.01 (ref 1–1.03)
UROBILINOGEN UR QL STRIP.AUTO: 0.2 E.U./DL
WBC # BLD AUTO: 12.17 THOUSAND/UL (ref 4.31–10.16)
WBC #/AREA URNS AUTO: ABNORMAL /HPF

## 2025-05-26 PROCEDURE — 87086 URINE CULTURE/COLONY COUNT: CPT | Performed by: EMERGENCY MEDICINE

## 2025-05-26 PROCEDURE — 99285 EMERGENCY DEPT VISIT HI MDM: CPT

## 2025-05-26 PROCEDURE — 81003 URINALYSIS AUTO W/O SCOPE: CPT | Performed by: EMERGENCY MEDICINE

## 2025-05-26 PROCEDURE — 87811 SARS-COV-2 COVID19 W/OPTIC: CPT | Performed by: EMERGENCY MEDICINE

## 2025-05-26 PROCEDURE — 96365 THER/PROPH/DIAG IV INF INIT: CPT

## 2025-05-26 PROCEDURE — 82805 BLOOD GASES W/O2 SATURATION: CPT | Performed by: INTERNAL MEDICINE

## 2025-05-26 PROCEDURE — 80053 COMPREHEN METABOLIC PANEL: CPT | Performed by: EMERGENCY MEDICINE

## 2025-05-26 PROCEDURE — 83605 ASSAY OF LACTIC ACID: CPT | Performed by: EMERGENCY MEDICINE

## 2025-05-26 PROCEDURE — 93005 ELECTROCARDIOGRAM TRACING: CPT

## 2025-05-26 PROCEDURE — 94002 VENT MGMT INPAT INIT DAY: CPT

## 2025-05-26 PROCEDURE — 36415 COLL VENOUS BLD VENIPUNCTURE: CPT | Performed by: EMERGENCY MEDICINE

## 2025-05-26 PROCEDURE — 99223 1ST HOSP IP/OBS HIGH 75: CPT | Performed by: INTERNAL MEDICINE

## 2025-05-26 PROCEDURE — 87040 BLOOD CULTURE FOR BACTERIA: CPT | Performed by: EMERGENCY MEDICINE

## 2025-05-26 PROCEDURE — 70496 CT ANGIOGRAPHY HEAD: CPT

## 2025-05-26 PROCEDURE — 94760 N-INVAS EAR/PLS OXIMETRY 1: CPT

## 2025-05-26 PROCEDURE — 85730 THROMBOPLASTIN TIME PARTIAL: CPT | Performed by: EMERGENCY MEDICINE

## 2025-05-26 PROCEDURE — 85610 PROTHROMBIN TIME: CPT | Performed by: EMERGENCY MEDICINE

## 2025-05-26 PROCEDURE — 70450 CT HEAD/BRAIN W/O DYE: CPT

## 2025-05-26 PROCEDURE — 85025 COMPLETE CBC W/AUTO DIFF WBC: CPT | Performed by: EMERGENCY MEDICINE

## 2025-05-26 PROCEDURE — 82805 BLOOD GASES W/O2 SATURATION: CPT | Performed by: EMERGENCY MEDICINE

## 2025-05-26 PROCEDURE — 83735 ASSAY OF MAGNESIUM: CPT | Performed by: EMERGENCY MEDICINE

## 2025-05-26 PROCEDURE — 87804 INFLUENZA ASSAY W/OPTIC: CPT | Performed by: EMERGENCY MEDICINE

## 2025-05-26 PROCEDURE — 81001 URINALYSIS AUTO W/SCOPE: CPT | Performed by: EMERGENCY MEDICINE

## 2025-05-26 PROCEDURE — 5A09357 ASSISTANCE WITH RESPIRATORY VENTILATION, LESS THAN 24 CONSECUTIVE HOURS, CONTINUOUS POSITIVE AIRWAY PRESSURE: ICD-10-PCS | Performed by: STUDENT IN AN ORGANIZED HEALTH CARE EDUCATION/TRAINING PROGRAM

## 2025-05-26 PROCEDURE — 84484 ASSAY OF TROPONIN QUANT: CPT | Performed by: EMERGENCY MEDICINE

## 2025-05-26 PROCEDURE — 70498 CT ANGIOGRAPHY NECK: CPT

## 2025-05-26 PROCEDURE — 99285 EMERGENCY DEPT VISIT HI MDM: CPT | Performed by: EMERGENCY MEDICINE

## 2025-05-26 PROCEDURE — 71045 X-RAY EXAM CHEST 1 VIEW: CPT

## 2025-05-26 PROCEDURE — 82948 REAGENT STRIP/BLOOD GLUCOSE: CPT

## 2025-05-26 PROCEDURE — 94640 AIRWAY INHALATION TREATMENT: CPT

## 2025-05-26 RX ORDER — PROPRANOLOL HCL 20 MG
20 TABLET ORAL EVERY 12 HOURS
Status: DISCONTINUED | OUTPATIENT
Start: 2025-05-26 | End: 2025-05-27 | Stop reason: HOSPADM

## 2025-05-26 RX ORDER — LEVOTHYROXINE SODIUM 88 UG/1
88 TABLET ORAL
Status: DISCONTINUED | OUTPATIENT
Start: 2025-05-26 | End: 2025-05-27 | Stop reason: HOSPADM

## 2025-05-26 RX ORDER — PANTOPRAZOLE SODIUM 40 MG/10ML
40 INJECTION, POWDER, LYOPHILIZED, FOR SOLUTION INTRAVENOUS EVERY 12 HOURS SCHEDULED
Status: DISCONTINUED | OUTPATIENT
Start: 2025-05-26 | End: 2025-05-27

## 2025-05-26 RX ORDER — LEVALBUTEROL INHALATION SOLUTION 1.25 MG/3ML
1.25 SOLUTION RESPIRATORY (INHALATION)
Status: DISCONTINUED | OUTPATIENT
Start: 2025-05-26 | End: 2025-05-27 | Stop reason: HOSPADM

## 2025-05-26 RX ORDER — FAMOTIDINE 20 MG/1
20 TABLET, FILM COATED ORAL DAILY
Status: DISCONTINUED | OUTPATIENT
Start: 2025-05-26 | End: 2025-05-27 | Stop reason: HOSPADM

## 2025-05-26 RX ORDER — LEVETIRACETAM 500 MG/5ML
750 INJECTION, SOLUTION, CONCENTRATE INTRAVENOUS EVERY 12 HOURS SCHEDULED
Status: DISCONTINUED | OUTPATIENT
Start: 2025-05-26 | End: 2025-05-26

## 2025-05-26 RX ORDER — SODIUM CHLORIDE, SODIUM GLUCONATE, SODIUM ACETATE, POTASSIUM CHLORIDE, MAGNESIUM CHLORIDE, SODIUM PHOSPHATE, DIBASIC, AND POTASSIUM PHOSPHATE .53; .5; .37; .037; .03; .012; .00082 G/100ML; G/100ML; G/100ML; G/100ML; G/100ML; G/100ML; G/100ML
75 INJECTION, SOLUTION INTRAVENOUS CONTINUOUS
Status: DISCONTINUED | OUTPATIENT
Start: 2025-05-26 | End: 2025-05-27 | Stop reason: HOSPADM

## 2025-05-26 RX ORDER — VALPROIC ACID 250 MG/5ML
500 SOLUTION ORAL EVERY 6 HOURS SCHEDULED
Status: DISCONTINUED | OUTPATIENT
Start: 2025-05-26 | End: 2025-05-27 | Stop reason: HOSPADM

## 2025-05-26 RX ORDER — METHYLPREDNISOLONE SODIUM SUCCINATE 40 MG/ML
40 INJECTION, POWDER, LYOPHILIZED, FOR SOLUTION INTRAMUSCULAR; INTRAVENOUS EVERY 8 HOURS SCHEDULED
Status: DISCONTINUED | OUTPATIENT
Start: 2025-05-26 | End: 2025-05-27

## 2025-05-26 RX ORDER — WARFARIN SODIUM 3 MG/1
3 TABLET ORAL
Status: DISCONTINUED | OUTPATIENT
Start: 2025-05-26 | End: 2025-05-27 | Stop reason: HOSPADM

## 2025-05-26 RX ORDER — HEPARIN SODIUM 5000 [USP'U]/ML
5000 INJECTION, SOLUTION INTRAVENOUS; SUBCUTANEOUS EVERY 8 HOURS SCHEDULED
Status: DISCONTINUED | OUTPATIENT
Start: 2025-05-26 | End: 2025-05-27 | Stop reason: HOSPADM

## 2025-05-26 RX ORDER — ATORVASTATIN CALCIUM 10 MG/1
10 TABLET, FILM COATED ORAL DAILY
Status: DISCONTINUED | OUTPATIENT
Start: 2025-05-26 | End: 2025-05-27 | Stop reason: HOSPADM

## 2025-05-26 RX ADMIN — LEVETIRACETAM 750 MG: 250 TABLET, FILM COATED ORAL at 21:19

## 2025-05-26 RX ADMIN — METHYLPREDNISOLONE SODIUM SUCCINATE 40 MG: 40 INJECTION, POWDER, FOR SOLUTION INTRAMUSCULAR; INTRAVENOUS at 21:18

## 2025-05-26 RX ADMIN — LEVETIRACETAM 750 MG: 100 INJECTION, SOLUTION, CONCENTRATE INTRAVENOUS at 11:26

## 2025-05-26 RX ADMIN — SODIUM CHLORIDE, SODIUM GLUCONATE, SODIUM ACETATE, POTASSIUM CHLORIDE, MAGNESIUM CHLORIDE, SODIUM PHOSPHATE, DIBASIC, AND POTASSIUM PHOSPHATE 75 ML/HR: .53; .5; .37; .037; .03; .012; .00082 INJECTION, SOLUTION INTRAVENOUS at 11:28

## 2025-05-26 RX ADMIN — HEPARIN SODIUM 5000 UNITS: 5000 INJECTION, SOLUTION INTRAVENOUS; SUBCUTANEOUS at 15:29

## 2025-05-26 RX ADMIN — HEPARIN SODIUM 5000 UNITS: 5000 INJECTION, SOLUTION INTRAVENOUS; SUBCUTANEOUS at 21:18

## 2025-05-26 RX ADMIN — LEVALBUTEROL HYDROCHLORIDE 1.25 MG: 1.25 SOLUTION RESPIRATORY (INHALATION) at 19:44

## 2025-05-26 RX ADMIN — WARFARIN SODIUM 3 MG: 3 TABLET ORAL at 17:38

## 2025-05-26 RX ADMIN — METHYLPREDNISOLONE SODIUM SUCCINATE 40 MG: 40 INJECTION, POWDER, FOR SOLUTION INTRAMUSCULAR; INTRAVENOUS at 11:53

## 2025-05-26 RX ADMIN — IPRATROPIUM BROMIDE 0.5 MG: 0.5 SOLUTION RESPIRATORY (INHALATION) at 11:14

## 2025-05-26 RX ADMIN — PANTOPRAZOLE SODIUM 40 MG: 40 INJECTION, POWDER, FOR SOLUTION INTRAVENOUS at 21:19

## 2025-05-26 RX ADMIN — LEVALBUTEROL HYDROCHLORIDE 1.25 MG: 1.25 SOLUTION RESPIRATORY (INHALATION) at 11:14

## 2025-05-26 RX ADMIN — PANTOPRAZOLE SODIUM 40 MG: 40 INJECTION, POWDER, FOR SOLUTION INTRAVENOUS at 11:27

## 2025-05-26 RX ADMIN — IOHEXOL 90 ML: 350 INJECTION, SOLUTION INTRAVENOUS at 07:10

## 2025-05-26 RX ADMIN — PIPERACILLIN AND TAZOBACTAM 4.5 G: 4; .5 INJECTION, POWDER, FOR SOLUTION INTRAVENOUS at 07:42

## 2025-05-26 RX ADMIN — VALPROIC ACID 500 MG: 250 SOLUTION ORAL at 15:28

## 2025-05-26 RX ADMIN — SERTRALINE HYDROCHLORIDE 50 MG: 50 TABLET ORAL at 15:28

## 2025-05-26 RX ADMIN — FAMOTIDINE 20 MG: 20 TABLET, FILM COATED ORAL at 15:28

## 2025-05-26 RX ADMIN — PROPRANOLOL HYDROCHLORIDE 20 MG: 20 TABLET ORAL at 22:35

## 2025-05-26 RX ADMIN — VALPROIC ACID 500 MG: 250 SOLUTION ORAL at 21:19

## 2025-05-26 RX ADMIN — IPRATROPIUM BROMIDE 0.5 MG: 0.5 SOLUTION RESPIRATORY (INHALATION) at 19:44

## 2025-05-26 NOTE — ASSESSMENT & PLAN NOTE
Slightly tachycardic but blood pressure low.  Hold propranolol for now as patient on BiPAP and unable to take p.o.  Also hold Coumadin for now.  Restart once patient able to take p.o.

## 2025-05-26 NOTE — QUICK NOTE
Patient not seen, recommendation based on information provided by ED physician over the phone    Stroke alert called: 1851 for possible L facial droop. However, per ED attending Dr. Ginette Pollard's examination, no facial droop was identified. ED attending decided to cancel stroke alert as patient's symptoms likely due to resp failure.  CTH showed no acute abnormality. CTA: no LVO.  /56 (BP Location: Right arm)   Pulse (!) 119   Temp 97.9 °F (36.6 °C) (Oral)   Resp 18   Wt 107 kg (235 lb 14.3 oz)   SpO2 91%   BMI 40.49 kg/m²     Please feel free to call neurology if any qs.

## 2025-05-26 NOTE — H&P
H&P - Hospitalist   Name: Adelina Soto 65 y.o. female I MRN: [id number]  Unit/Bed#: -01 I Date of Admission: 5/26/2025   Date of Service: 5/26/2025 I Hospital Day: 0     Assessment & Plan  Acute hypercapnic respiratory failure (HCC)  Possibly from underlying COPD with possible exacerbation.  Patient lethargic and unable to give any history.  Per patient's significant other, patient was confused.  Did not complain of shortness of breath.  On examination does have significantly decreased air entry bilaterally    Patient currently on BiPAP.  Change settings to 20/5 as a repeat VBG showed minimal improvement in pH and CO2.  Repeat VBG in 2 to 3 hours.  Had lengthy goals of care discussion with patient's significant other/MINDY Apodaca.  Patient is DNR/DNI.  After her previous hospitalization when she was on ventilator for a prolonged time, patient did not want to be back on the ventilator.  Explained to her the risks and she understood that if things do not improve on BiPAP, it could be fatal.  She understood and still wanted to respect patient's wishes of being DNR/DNI.  Will start the patient on scheduled Xopenex and Atrovent and Solu-Medrol for possible COPD exacerbation.  COPD with possible exacerbation (HCC)  Please refer to above for details.  Paroxysmal atrial fibrillation (HCC)  Slightly tachycardic but blood pressure low.  Hold propranolol for now as patient on BiPAP and unable to take p.o.  Also hold Coumadin for now.  Restart once patient able to take p.o.  Hypothyroid  Continue levothyroxine.  Hold for now until patient NPO.  Acute metabolic encephalopathy  Secondary to hypercapnia.  Continue to monitor on BiPAP.  Seizure (HCC)  Patient on Keppra.  For now we will change to IV as patient unable to take p.o.      VTE Pharmacologic Prophylaxis: VTE Score: 6 Moderate Risk (Score 3-4) - Pharmacological DVT Prophylaxis Ordered: heparin.  Code Status: Level 3 - DNAR and DNI   Discussion with family:  Updated  (significant other) at bedside.    Anticipated Length of Stay: Patient will be admitted on an inpatient basis with an anticipated length of stay of greater than 2 midnights secondary to acute hypercapnic respiratory failure.    History of Present Illness   Chief Complaint: confusion    Adelina Soto is a 65 y.o. female with a PMH as below who presents with acute onset of confusion/lethargy since early this morning.  Patient lethargic on my evaluation.  Unable to provide any history.  All the history obtained from patient and significant other/MINDY Apodaca at bedside.    Per POA, patient was in her usual state of health until last night when she went to bed.  Earlier this morning patient woke up and was very confused and lethargic.  Did not recognize her significant other and was talking bizarre.  So she brought her to the hospital.    On arrival to ED patient was pretty lethargic, VBG showed hypercapnia and pH of 7.1.  Patient was placed on BiPAP.    Detailed goals of care conversation were had with patient's significant other/MINDY Apodaca -she mentioned that patient does not want to be intubated or have chest compressions.  Patient had prolonged intubation and mechanical ventilation sometime last year.  After which patient had significant mental trauma and patient would not want to be on ventilator ever again.  So at this point she would not like to respect patient's wishes.    Review of Systems    Historical Information   Past Medical History[1]  Past Surgical History[2]  Social History[3]  E-Cigarette/Vaping    E-Cigarette Use Never User      E-Cigarette/Vaping Substances    Nicotine No     THC Yes     CBD No     Flavoring No      Family History[4]  Social History:  Marital Status: Single   Occupation:   Patient Pre-hospital Living Situation:   Patient Pre-hospital Level of Mobility:   Patient Pre-hospital Diet Restrictions:     Meds/Allergies   I have reviewed home medications with patient  family member.  Prior to Admission medications    Medication Sig Start Date End Date Taking? Authorizing Provider   albuterol (2.5 mg/3 mL) 0.083 % nebulizer solution Take 3 mL (2.5 mg total) by nebulization every 6 (six) hours as needed for wheezing or shortness of breath 5/2/25   Aleida Nix, DO   albuterol (PROVENTIL HFA,VENTOLIN HFA) 90 mcg/act inhaler INHALE 1 PUFF EVERY 4 HOURS AS NEEDED FOR WHEEZING. 3/13/25   TULIO Finney   atorvastatin (LIPITOR) 10 mg tablet TAKE 1 TABLET BY MOUTH EVERY DAY 4/17/25   TULIO Finney   Blood Glucose Monitoring Suppl (Accu-Chek Guide) w/Device KIT Use daily before breakfast  Patient not taking: Reported on 4/29/2025 12/16/24   TULIO Fniney   cholecalciferol (VITAMIN D3) 400 units tablet Take 1 tablet (400 Units total) by mouth daily  Patient taking differently: Take 400 Units by mouth in the morning. Pt takes 1000 iu daily. 11/20/23   TULIO Finney   famotidine (PEPCID) 20 mg tablet TAKE 1 TABLET BY MOUTH EVERY DAY 4/15/25   TULIO Finney   furosemide (LASIX) 40 mg tablet Take 1 tablet (40 mg total) by mouth daily  Patient not taking: Reported on 12/10/2024 2/15/23 12/10/24  Henri Oglesby MD   glucose blood (OneTouch Verio) test strip Use 1 each daily before breakfast Use as instructed  Patient not taking: Reported on 4/29/2025 3/25/25   TULIO Finney   ipratropium (ATROVENT) 0.02 % nebulizer solution USE 1 VIAL IN NEBULIZER 4 TIMES DAILY 4/24/25   KHAI De Oliveira,    ipratropium-albuterol (DUO-NEB) 0.5-2.5 mg/3 mL nebulizer solution Take 3 mL by nebulization every 8 (eight) hours as needed for wheezing or shortness of breath  Patient not taking: Reported on 12/10/2024 7/26/23   TULIO Finney   levETIRAcetam (KEPPRA) 750 mg tablet Take 1 tablet (750 mg total) by mouth every 12 (twelve) hours 4/29/25   Jerrica Uribe MD   levothyroxine 88 mcg tablet Take 1 tablet (88 mcg total) by mouth daily in the early  "morning 3/25/25   TULIO Finney   LORazepam (ATIVAN) 0.5 mg tablet Take 1 tablet (0.5 mg total) by mouth 2 (two) times a day as needed for anxiety 11/4/24   TULIO Finney   nystatin (MYCOSTATIN) cream Apply topically 2 (two) times a day  Patient not taking: Reported on 5/23/2025 9/18/23   TULIO Finney   OneTouch Delica Lancets 33G MISC Check blood sugars once daily. Please substitute with appropriate alternative as covered by patient's insurance. Dx: E11.65  Patient not taking: Reported on 4/29/2025 12/13/24   TULIO Finney   oxygen gas Inhale 2 L/min continuous. Via nasal cannula  Patient presently using 3 L    Historical MD [place]   pantoprazole (PROTONIX) 40 mg tablet Take 1 tablet (40 mg total) by mouth daily 4/25/24   TULIO Finney   propranolol (INDERAL) 20 mg tablet Take 1 tablet (20 mg total) by mouth every 12 (twelve) hours 3/25/25   TULIO Finney   sertraline (ZOLOFT) 50 mg tablet TAKE 1 TABLET BY MOUTH EVERY DAY 4/15/25   TULIO Finney   valproic acid (DEPAKENE) 250 MG/5ML soln TAKE 10 ML (500 MG TOTAL) BY MOUTH EVERY 6 (SIX) HOURS 5/1/25   Aleida Nix DO   warfarin (Coumadin) 1 mg tablet Take 1 tablet (1 mg total) by mouth daily Monday,Wednesday, Friday 3 mg Sunday, Saturday, Tuesday, Thursday 1 mg  Patient taking differently: Take 3 tablets by mouth daily. Monday through Saturday 3 mg   Sunday 6 mg 12/9/24   Thuy Son MD   warfarin (COUMADIN) 3 mg tablet TAKE 1 TABLET BY MOUTH DAILY  Patient not taking: Reported on 5/21/2025 4/28/25   TULIO Finney     Allergies   Allergen Reactions    Nystatin Dermatitis     Per SO shaun it makes her \"more sore\"       Objective :  Temp:  [97.9 °F (36.6 °C)-98.3 °F (36.8 °C)] 98.3 °F (36.8 °C)  HR:  [] 110  BP: ()/(54-84) 108/84  Resp:  [18-26] 20  SpO2:  [90 %-97 %] 92 %  O2 Device: BiPAP  Nasal Cannula O2 Flow Rate (L/min):  [3 L/min] 3 L/min  FiO2 (%):  [35] 35    Physical Exam "   Constitutional: Pt appears comfortable. Not in any acute distress.  HENT:   Head: Normocephalic and atraumatic.   Neck: Neck supple.   Cardiovascular: tachycardia, regular rhythm, normal heart sounds.  No murmur heard.  Pulmonary/Chest: Patient on BiPAP, decreased air entry bilaterally. Pt has no wheezes or crackles.   Abdominal: Soft. Non-distended, Non-tender. Bowel sounds are normal.   Neurological: Lethargic, minimal response to sternal rub.  Did not open eyes, did not answer any questions or follow commands.    Lines/Drains:  Lines/Drains/Airways       Active Status       Name Placement date Placement time Site Days    Urethral Catheter Straight-tip 16 Fr. 05/26/25  0502  Straight-tip  less than 1                  Urinary Catheter:  Goal for removal: Voiding trial when ambulation improves               Lab Results: I have reviewed the following results:  Results from last 7 days   Lab Units 05/26/25  0427   WBC Thousand/uL 12.17*   HEMOGLOBIN g/dL 11.0*   HEMATOCRIT % 37.0   PLATELETS Thousands/uL 280   SEGS PCT % 87*   LYMPHO PCT % 6*   MONO PCT % 6   EOS PCT % 0     Results from last 7 days   Lab Units 05/26/25  0427   SODIUM mmol/L 134*   POTASSIUM mmol/L 4.4   CHLORIDE mmol/L 95*   CO2 mmol/L 35*   BUN mg/dL 23   CREATININE mg/dL 1.36*   ANION GAP mmol/L 4   CALCIUM mg/dL 9.8   ALBUMIN g/dL 3.6   TOTAL BILIRUBIN mg/dL 0.45   ALK PHOS U/L 132*   ALT U/L 11   AST U/L 19   GLUCOSE RANDOM mg/dL 166*     Results from last 7 days   Lab Units 05/26/25  0509   INR  2.09*     Results from last 7 days   Lab Units 05/26/25  0708 05/26/25  0422   POC GLUCOSE mg/dl 159* 164*     Lab Results   Component Value Date    HGBA1C 4.9 05/20/2025    HGBA1C 5.7 (H) 11/13/2024    HGBA1C 5.5 11/04/2024     Results from last 7 days   Lab Units 05/26/25  0427   LACTIC ACID mmol/L 1.2             Administrative Statements       ** Please Note: This note has been constructed using a voice recognition system. **         [1]   Past  Medical History:  Diagnosis Date    Anemia     Arthritis     Cancer of kidney (HCC)     Chronic kidney disease     Diabetes mellitus (HCC)     Disease of thyroid gland     HLD (hyperlipidemia)     Hypertension     Ovarian cancer (HCC)     Pneumonia    [2]   Past Surgical History:  Procedure Laterality Date    CHOLECYSTECTOMY      CT GUIDED PERC DRAINAGE CATHETER PLACEMENT  2016    FL LUMBAR PUNCTURE DIAGNOSTIC  2024    GALLBLADDER SURGERY  2004    HYSTERECTOMY  2018    NEPHRECTOMY Right 2018   [3]   Social History  Tobacco Use    Smoking status: Former     Current packs/day: 0.00     Average packs/day: 3.0 packs/day for 30.0 years (90.0 ttl pk-yrs)     Types: Cigarettes     Start date: 10/22/1980     Quit date: 10/22/2010     Years since quittin.6    Smokeless tobacco: Former     Quit date: 2011    Tobacco comments:     quit approx. 9 years ago   Vaping Use    Vaping status: Never Used   Substance and Sexual Activity    Alcohol use: Never     Comment: n/a    Drug use: Yes     Types: Marijuana     Comment: smokes with girlfriend when anxious    Sexual activity: Yes     Partners: Female   [4]   Family History  Problem Relation Name Age of Onset    No Known Problems Mother      No Known Problems Father

## 2025-05-26 NOTE — ED CARE HANDOFF
Emergency Department Sign Out Note        Sign out and transfer of care from Dr. Washington. See Separate Emergency Department note.     The patient, Adelina Soto, was evaluated by the previous provider for altered mental status and facial droop.  When I inherited the patient, the patient was pending repeat VBG as she was hypercarbic and acidotic.    Physical Examination:  Constitutional: Vital signs reviewed, patient unwell appearing, rouses to sternal rub, on BiPAP mask  HEENT: Trachea midline, no JVD, moist mucous membranes   Respiratory: Equal chest expansion; on BiPAP with equal chest rise  Cardiovascular: Well perfused   Abdomen: No distension   Neuro: arouses to sternal rub; will open eyes and track around the room but easily returns to sleep; nonfocal neurologic assessment  Skin: warm, dry, intact, no rashes noted     Workup Completed:  Results Reviewed       Procedure Component Value Units Date/Time    HS Troponin I 4hr [[id number]]  (Normal) Collected: 05/26/25 0910    Lab Status: Final result Specimen: Blood from Arm, Left Updated: 05/26/25 0945     hs TnI 4hr 24 ng/L      Delta 4hr hsTnI 9 ng/L     Blood gas, venous [[id number]]  (Abnormal) Collected: 05/26/25 0910    Lab Status: Final result Specimen: Blood from Arm, Left Updated: 05/26/25 0928     pH, Shayan 7.180     pCO2, Shayan 84.2 mm Hg      pO2, Shayan 87.8 mm Hg      HCO3, Shayan 30.7 mmol/L      Base Excess, Shayan 0.4 mmol/L      O2 Content, Shayan 15.7 ml/dL      O2 HGB, VENOUS 92.3 %     Blood culture #1 [[id number]] Collected: 05/26/25 0740    Lab Status: In process Specimen: Blood from Arm, Right Updated: 05/26/25 0830    Blood culture #2 [[id number]] Collected: 05/26/25 0428    Lab Status: In process Specimen: Blood from Arm, Left Updated: 05/26/25 0830    Fingerstick Glucose (POCT) [[id number]]  (Abnormal) Collected: 05/26/25 0708    Lab Status: Final result Specimen: Blood Updated: 05/26/25 0713     POC Glucose 159 mg/dl     HS Troponin I 2hr [[id number]]   (Normal) Collected: 05/26/25 0640    Lab Status: Final result Specimen: Blood from Arm, Left Updated: 05/26/25 0708     hs TnI 2hr 18 ng/L      Delta 2hr hsTnI 3 ng/L     Blood gas, venous [[id number]]  (Abnormal) Collected: 05/26/25 0626    Lab Status: Final result Specimen: Blood from Arm, Left Updated: 05/26/25 0702     pH, Shayan 7.166     pCO2, Shayan 93.1 mm Hg      pO2, Shayan 183.0 mm Hg      HCO3, Shayan 32.9 mmol/L      Base Excess, Shayan 1.8 mmol/L      O2 Content, Shayan 17.0 ml/dL      O2 HGB, VENOUS 95.8 %     Urine Microscopic [[id number]]  (Abnormal) Collected: 05/26/25 0509    Lab Status: Final result Specimen: Urine, Indwelling Stringer Catheter Updated: 05/26/25 0535     RBC, UA 4-10 /hpf      WBC, UA 0-5 /hpf      Epithelial Cells Occasional /hpf      Bacteria, UA Occasional /hpf      MUCUS THREADS Occasional    Protime-INR [[id number]]  (Abnormal) Collected: 05/26/25 0509    Lab Status: Final result Specimen: Blood from Arm, Left Updated: 05/26/25 0525     Protime 23.7 seconds      INR 2.09    Narrative:      INR Therapeutic Range    Indication                                             INR Range      Atrial Fibrillation                                               2.0-3.0  Hypercoagulable State                                    2.0.2.3  Left Ventricular Asist Device                            2.0-3.0  Mechanical Heart Valve                                  -    Aortic(with afib, MI, embolism, HF, LA enlargement,    and/or coagulopathy)                                     2.0-3.0 (2.5-3.5)     Mitral                                                             2.5-3.5  Prosthetic/Bioprosthetic Heart Valve               2.0-3.0  Venous thromboembolism (VTE: VT, PE        2.0-3.0    APTT [[id number]]  (Abnormal) Collected: 05/26/25 0509    Lab Status: Final result Specimen: Blood from Arm, Left Updated: 05/26/25 0525     PTT 48 seconds     UA (URINE) with reflex to Scope [[id number]]  (Abnormal) Collected: 05/26/25 0509     Lab Status: Final result Specimen: Urine, Indwelling Stringer Catheter Updated: 05/26/25 0520     Color, UA Yellow     Clarity, UA Slightly Cloudy     Specific Gravity, UA 1.015     pH, UA 7.5     Leukocytes, UA Negative     Nitrite, UA Negative     Protein, UA 2+ mg/dl      Glucose, UA Negative mg/dl      Ketones, UA Negative mg/dl      Urobilinogen, UA 0.2 E.U./dl      Bilirubin, UA Negative     Occult Blood, UA 2+    Urine culture [[id number]] Collected: 05/26/25 0511    Lab Status: In process Specimen: Urine, Indwelling Stringer Catheter Updated: 05/26/25 0517    HS Troponin 0hr (reflex protocol) [[id number]]  (Normal) Collected: 05/26/25 0427    Lab Status: Final result Specimen: Blood from Arm, Left Updated: 05/26/25 0500     hs TnI 0hr 15 ng/L     FLU/COVID Rapid Antigen (30 min. TAT) - Preferred screening test in ED [[id number]]  (Normal) Collected: 05/26/25 0427    Lab Status: Final result Specimen: Nares from Nose Updated: 05/26/25 0459     SARS COV Rapid Antigen Negative     Influenza A Rapid Antigen Negative     Influenza B Rapid Antigen Negative    Narrative:      This test has been performed using the [employer] Isabel 2 FLU+SARS Antigen test under the Emergency Use Authorization (EUA). This test has been validated by the  and verified by the performing laboratory. The Isabel uses lateral flow immunofluorescent sandwich assay to detect SARS-COV, Influenza A and Influenza B Antigen.     The [employer] Isabel 2 SARS Antigen test does not differentiate between SARS-CoV and SARS-CoV-2.     Negative results are presumptive and may be confirmed with a molecular assay, if necessary, for patient management. Negative results do not rule out SARS-CoV-2 or influenza infection and should not be used as the sole basis for treatment or patient management decisions. A negative test result may occur if the level of antigen in a sample is below the limit of detection of this test.     Positive results are indicative of the  presence of viral antigens, but do not rule out bacterial infection or co-infection with other viruses.     All test results should be used as an adjunct to clinical observations and other information available to the provider.    FOR PEDIATRIC PATIENTS - copy/paste COVID Guidelines URL to browser: [email or web address]    Lactic acid, plasma (w/reflex if result > 2.0) [[id number]]  (Normal) Collected: 05/26/25 0427    Lab Status: Final result Specimen: Blood from Arm, Left Updated: 05/26/25 0456     LACTIC ACID 1.2 mmol/L     Narrative:      Result may be elevated if tourniquet was used during collection.    Comprehensive metabolic panel [[id number]]  (Abnormal) Collected: 05/26/25 0427    Lab Status: Final result Specimen: Blood from Arm, Left Updated: 05/26/25 0456     Sodium 134 mmol/L      Potassium 4.4 mmol/L      Chloride 95 mmol/L      CO2 35 mmol/L      ANION GAP 4 mmol/L      BUN 23 mg/dL      Creatinine 1.36 mg/dL      Glucose 166 mg/dL      Calcium 9.8 mg/dL      AST 19 U/L      ALT 11 U/L      Alkaline Phosphatase 132 U/L      Total Protein 8.0 g/dL      Albumin 3.6 g/dL      Total Bilirubin 0.45 mg/dL      eGFR 40 ml/min/1.73sq m     Narrative:      National Kid[employer] guidelines for Chronic Kidney Disease (CKD):     Stage 1 with normal or high GFR (GFR > 90 mL/min/1.73 square meters)    Stage 2 Mild CKD (GFR = 60-89 mL/min/1.73 square meters)    Stage 3A Moderate CKD (GFR = 45-59 mL/min/1.73 square meters)    Stage 3B Moderate CKD (GFR = 30-44 mL/min/1.73 square meters)    Stage 4 Severe CKD (GFR = 15-29 mL/min/1.73 square meters)    Stage 5 End Stage CKD (GFR <15 mL/min/1.73 square meters)  Note: GFR calculation is accurate only with a steady state creatinine    Magnesium [[id number]]  (Abnormal) Collected: 05/26/25 0427    Lab Status: Final result Specimen: Blood from Arm, Left Updated: 05/26/25 0456     Magnesium 1.8 mg/dL     CBC and  differential [[id number]]  (Abnormal) Collected: 05/26/25 0427    Lab Status: Final result Specimen: Blood from Arm, Left Updated: 05/26/25 0438     WBC 12.17 Thousand/uL      RBC 3.46 Million/uL      Hemoglobin 11.0 g/dL      Hematocrit 37.0 %       fL      MCH 31.8 pg      MCHC 29.7 g/dL      RDW 14.3 %      MPV 9.2 fL      Platelets 280 Thousands/uL      nRBC 0 /100 WBCs      Segmented % 87 %      Immature Grans % 1 %      Lymphocytes % 6 %      Monocytes % 6 %      Eosinophils Relative 0 %      Basophils Relative 0 %      Absolute Neutrophils 10.47 Thousands/µL      Absolute Immature Grans 0.13 Thousand/uL      Absolute Lymphocytes 0.77 Thousands/µL      Absolute Monocytes 0.72 Thousand/µL      Eosinophils Absolute 0.04 Thousand/µL      Basophils Absolute 0.04 Thousands/µL     Fingerstick Glucose (POCT) [[id number]]  (Abnormal) Collected: 05/26/25 0422    Lab Status: Final result Specimen: Blood Updated: 05/26/25 0424     POC Glucose 164 mg/dl           CTA stroke alert (head/neck)   Final Result by Daniel Reinoso MD (05/26 0732)   CT angiogram of the head degraded by motion artifact, but no large vessel occlusion, high-grade stenosis, or intracranial aneurysm identified on CT angiogram of the head.      No hemodynamically significant stenosis or dissection identified on CT angiogram of the neck.      Enlarged main pulmonary artery indicative of pulmonary hypertension.            Attempt was initially made to convey the results of the examination to the neuro on-call stroke service at 7:16 a.m. on 5/26/2025 using epic secure cat as well as by calling. Results subsequently conveyed to Dr. Washington at 7:27 a.m. on 5/26/2025.      Workstation performed: [id number]         CT stroke alert brain   Final Result by Daniel Reinoso MD (05/26 0731)      No acute intracranial abnormality.      Initial attempt was made through secure epic chat as well as by calling, to reach the neuro stroke service on call at 7:05 a.m. on  "5/26/2025. Results subsequently conveyed to Dr. Washington by Dr. Reinoso on 5/26/2025 at 7:27 a.m.      Workstation performed: [id number]         CT head without contrast   Final Result by Isac Cortes MD (05/26 8712)      No acute intracranial abnormality.                  Workstation performed: [id number]         XR chest 1 view portable    (Results Pending)         ED Course / Workup Pending (followup):          No data recorded                          ED Course as of 05/26/25 0945   Mon May 26, 2025   0900 Per POA, no intubation or central line. No CPR. No pressors. ABX and IVF PRN. Awaiting VBG. Sign out from Dr. Washington pending admission and additional VBG.     Procedures  Medical Decision Making  65-year-old female presenting to the emergency department today with altered mental status.  Initial workup performed by Dr. Washington.  When I inherited the patient, the patient was pending VBG studies.  I independently evaluated the patient who is sleepy but arousable to sternal rub.  She remains on BiPAP.  VBG returns somewhat improved.  Case was discussed with Dr. Zuniga who accepts the patient for admission.  Chest x-ray suspicious for fluid overload versus retention to the right lung on my independent interpretation.  Patient remains DNR/DNI and does not accept central line or pressors.  The patient and/or patient's proxy verify their understanding and agree to the plan at this time.  All questions answered to the patient and/or their proxy's satisfaction.  All labs reviewed and utilized in the medical decision making process (if labs were ordered).  Portions of the record may have been created with voice recognition software.  Occasional wrong word or \"sound a like\" substitutions may have occurred due to the inherent limitations of voice recognition software.  Read the chart carefully and recognize, using context, where substitutions have occurred.    I reviewed prior notes.  I reviewed prior labs.  Case " discussed with POA.    Critical Care Time Statement: Upon my evaluation, this patient had a high probability of imminent or life-threatening deterioration due to hypercapnic respiratory failure requiring BiPAP, which required my direct attention, intervention, and personal management.  I spent a total of 32 minutes directly providing critical care services, including interpretation of complex medical databases, evaluating for the presence of life-threatening injuries or illnesses, complex medical decision making (to support/prevent further life-threatening deterioration)., and interpretation of hemodynamic data. This time is exclusive of procedures, teaching, treating other patients, family meetings, and any prior time recorded by providers other than myself.      Problems Addressed:  Goals of care, counseling/discussion: acute illness or injury  Hypercapnic respiratory failure (HCC): undiagnosed new problem with uncertain prognosis  Stroke-like symptoms: undiagnosed new problem with uncertain prognosis    Amount and/or Complexity of Data Reviewed  Independent Historian: caregiver  External Data Reviewed: labs and notes.  Labs: ordered. Decision-making details documented in ED Course.  Radiology: ordered and independent interpretation performed. Decision-making details documented in ED Course.     Details: CXR  Discussion of management or test interpretation with external provider(s): Dr. Efrain CACERES    [facility]  Prescription drug management.  Decision regarding hospitalization.            Disposition  Final diagnoses:   Hypercapnic respiratory failure (HCC)   Stroke-like symptoms   Goals of care, counseling/discussion     Time reflects when diagnosis was documented in both MDM as applicable and the Disposition within this note       Time User Action Codes Description Comment    5/26/2025  6:54 AM Zeeshan Chawla Add [I63.9] Stroke (cerebrum) (HCC)     5/26/2025  9:27 AM Remi Zamorano [J96.92]  Hypercapnic respiratory failure (HCC)     5/26/2025  9:27 AM Remi Zamorano [I63.9] Stroke (cerebrum) (HCC)     5/26/2025  9:27 AM Remi Zamorano [J96.92] Hypercapnic respiratory failure (HCC)     5/26/2025  9:27 AM Remi Zamorano [R29.90] Stroke-like symptoms     5/26/2025  9:28 AM Remi Zamorano [Z71.89] Goals of care, counseling/discussion           ED Disposition       ED Disposition   Admit    Condition   Stable    Date/Time   Mon May 26, 2025  9:28 AM    Comment   Case was discussed with Dr. Zuniga and the patient's admission status was agreed to be Admission Status: inpatient status to the service of Dr. Zuniga.               Follow-up Information    None       Patient's Medications   Discharge Prescriptions    No medications on file     No discharge procedures on file.       ED Provider  Electronically Signed by     Remi Zamorano PA-C  05/26/25 0940       Remi Zamorano PA-C  05/26/25 0945

## 2025-05-26 NOTE — ASSESSMENT & PLAN NOTE
Possibly from underlying COPD with possible exacerbation.  Patient lethargic and unable to give any history.  Per patient's significant other, patient was confused.  Did not complain of shortness of breath.  On examination does have significantly decreased air entry bilaterally    Patient currently on BiPAP.  Change settings to 20/5 as a repeat VBG showed minimal improvement in pH and CO2.  Repeat VBG in 2 to 3 hours.  Had lengthy goals of care discussion with patient's significant other/MINDY Apodaca.  Patient is DNR/DNI.  After her previous hospitalization when she was on ventilator for a prolonged time, patient did not want to be back on the ventilator.  Explained to her the risks and she understood that if things do not improve on BiPAP, it could be fatal.  She understood and still wanted to respect patient's wishes of being DNR/DNI.  Will start the patient on scheduled Xopenex and Atrovent and Solu-Medrol for possible COPD exacerbation.

## 2025-05-27 ENCOUNTER — HOME CARE VISIT (OUTPATIENT)
Dept: HOME HEALTH SERVICES | Facility: HOME HEALTHCARE | Age: 65
End: 2025-05-27
Payer: COMMERCIAL

## 2025-05-27 VITALS
TEMPERATURE: 99.4 F | WEIGHT: 235.89 LBS | RESPIRATION RATE: 25 BRPM | OXYGEN SATURATION: 98 % | BODY MASS INDEX: 40.27 KG/M2 | DIASTOLIC BLOOD PRESSURE: 66 MMHG | HEIGHT: 64 IN | SYSTOLIC BLOOD PRESSURE: 136 MMHG | HEART RATE: 84 BPM

## 2025-05-27 LAB
ATRIAL RATE: 111 BPM
ATRIAL RATE: 127 BPM
BASE EX.OXY STD BLDV CALC-SCNC: 95.6 % (ref 60–80)
BASE EXCESS BLDV CALC-SCNC: 6.8 MMOL/L
HCO3 BLDV-SCNC: 32.9 MMOL/L (ref 24–30)
INR PPP: 2.18 (ref 0.85–1.19)
O2 CT BLDV-SCNC: 15.3 ML/DL
PCO2 BLDV: 54.9 MM HG (ref 42–50)
PH BLDV: 7.4 [PH] (ref 7.3–7.4)
PO2 BLDV: 189 MM HG (ref 35–45)
PROTHROMBIN TIME: 24.5 SECONDS (ref 12.3–15)
QRS AXIS: 76 DEGREES
QRS AXIS: 84 DEGREES
QRSD INTERVAL: 102 MS
QRSD INTERVAL: 80 MS
QT INTERVAL: 246 MS
QT INTERVAL: 338 MS
QTC INTERVAL: 341 MS
QTC INTERVAL: 465 MS
T WAVE AXIS: 115 DEGREES
T WAVE AXIS: 205 DEGREES
VENTRICULAR RATE: 114 BPM
VENTRICULAR RATE: 116 BPM

## 2025-05-27 PROCEDURE — 85610 PROTHROMBIN TIME: CPT | Performed by: INTERNAL MEDICINE

## 2025-05-27 PROCEDURE — 94640 AIRWAY INHALATION TREATMENT: CPT

## 2025-05-27 PROCEDURE — 99239 HOSP IP/OBS DSCHRG MGMT >30: CPT | Performed by: STUDENT IN AN ORGANIZED HEALTH CARE EDUCATION/TRAINING PROGRAM

## 2025-05-27 PROCEDURE — 93010 ELECTROCARDIOGRAM REPORT: CPT | Performed by: INTERNAL MEDICINE

## 2025-05-27 PROCEDURE — 94760 N-INVAS EAR/PLS OXIMETRY 1: CPT

## 2025-05-27 PROCEDURE — 82805 BLOOD GASES W/O2 SATURATION: CPT

## 2025-05-27 RX ORDER — PREDNISONE 20 MG/1
40 TABLET ORAL DAILY
Status: DISCONTINUED | OUTPATIENT
Start: 2025-05-27 | End: 2025-05-27

## 2025-05-27 RX ORDER — PANTOPRAZOLE SODIUM 40 MG/1
40 TABLET, DELAYED RELEASE ORAL EVERY 12 HOURS SCHEDULED
Status: DISCONTINUED | OUTPATIENT
Start: 2025-05-27 | End: 2025-05-27 | Stop reason: HOSPADM

## 2025-05-27 RX ORDER — PREDNISONE 20 MG/1
40 TABLET ORAL DAILY
Status: DISCONTINUED | OUTPATIENT
Start: 2025-05-28 | End: 2025-05-27 | Stop reason: HOSPADM

## 2025-05-27 RX ORDER — PREDNISONE 20 MG/1
40 TABLET ORAL DAILY
Qty: 10 TABLET | Refills: 0 | Status: SHIPPED | OUTPATIENT
Start: 2025-05-28 | End: 2025-06-02

## 2025-05-27 RX ADMIN — IPRATROPIUM BROMIDE 0.5 MG: 0.5 SOLUTION RESPIRATORY (INHALATION) at 14:38

## 2025-05-27 RX ADMIN — METHYLPREDNISOLONE SODIUM SUCCINATE 40 MG: 40 INJECTION, POWDER, FOR SOLUTION INTRAMUSCULAR; INTRAVENOUS at 05:23

## 2025-05-27 RX ADMIN — LEVALBUTEROL HYDROCHLORIDE 1.25 MG: 1.25 SOLUTION RESPIRATORY (INHALATION) at 14:39

## 2025-05-27 RX ADMIN — SERTRALINE HYDROCHLORIDE 50 MG: 50 TABLET ORAL at 09:30

## 2025-05-27 RX ADMIN — LEVETIRACETAM 750 MG: 250 TABLET, FILM COATED ORAL at 09:30

## 2025-05-27 RX ADMIN — LEVOTHYROXINE SODIUM 88 MCG: 88 TABLET ORAL at 05:23

## 2025-05-27 RX ADMIN — SODIUM CHLORIDE, SODIUM GLUCONATE, SODIUM ACETATE, POTASSIUM CHLORIDE, MAGNESIUM CHLORIDE, SODIUM PHOSPHATE, DIBASIC, AND POTASSIUM PHOSPHATE 75 ML/HR: .53; .5; .37; .037; .03; .012; .00082 INJECTION, SOLUTION INTRAVENOUS at 00:37

## 2025-05-27 RX ADMIN — PROPRANOLOL HYDROCHLORIDE 20 MG: 20 TABLET ORAL at 11:34

## 2025-05-27 RX ADMIN — ATORVASTATIN CALCIUM 10 MG: 10 TABLET, FILM COATED ORAL at 09:30

## 2025-05-27 RX ADMIN — IPRATROPIUM BROMIDE 0.5 MG: 0.5 SOLUTION RESPIRATORY (INHALATION) at 07:17

## 2025-05-27 RX ADMIN — FAMOTIDINE 20 MG: 20 TABLET, FILM COATED ORAL at 09:30

## 2025-05-27 RX ADMIN — VALPROIC ACID 500 MG: 250 SOLUTION ORAL at 09:30

## 2025-05-27 RX ADMIN — VALPROIC ACID 500 MG: 250 SOLUTION ORAL at 03:03

## 2025-05-27 RX ADMIN — HEPARIN SODIUM 5000 UNITS: 5000 INJECTION, SOLUTION INTRAVENOUS; SUBCUTANEOUS at 05:23

## 2025-05-27 RX ADMIN — LEVALBUTEROL HYDROCHLORIDE 1.25 MG: 1.25 SOLUTION RESPIRATORY (INHALATION) at 07:17

## 2025-05-27 RX ADMIN — PANTOPRAZOLE SODIUM 40 MG: 40 TABLET, DELAYED RELEASE ORAL at 11:34

## 2025-05-27 NOTE — UTILIZATION REVIEW
Initial Clinical Review    Admission: Date/Time/Statement:   Admission Orders (From admission, onward)       Ordered        05/26/25 0928  INPATIENT ADMISSION  Once                          Orders Placed This Encounter   Procedures    INPATIENT ADMISSION     Standing Status:   Standing     Number of Occurrences:   1     Level of Care:   Level 2 Stepdown / HOT [14]     Estimated length of stay:   More than 2 Midnights     Certification:   I certify that inpatient services are medically necessary for this patient for a duration of greater than two midnights. See H&P and MD Progress Notes for additional information about the patient's course of treatment.     ED Arrival Information       Expected   -    Arrival   5/26/2025 04:09    Acuity   Urgent              Means of arrival   Ambulance    Escorted by   [facility]    Service   Hospitalist    Admission type   Emergency              Arrival complaint   Medical Issue             Chief Complaint   Patient presents with    Altered Mental Status     Patient arrived via ambulance from home. Ems report patient having increased confusion/hallucinations that started tonight. Hx of frequent UTI's.        Initial Presentation: 65 y.o. female w/ PMHx: Anemia, Arthritis, Cancer of kidney, CKD, DM, Disease of thyroid gland, HLD, HTN, Ovarian cancer, Pneumonia and COPD, who presented by EMS to [facility] ED. Admitted as Inpatient for evaluation and treatment of Acute hypercapnic resp failure. COPD w/ possible exacerbation.      Presented w/ acute onset of confusion/lethargy this morning. Per POA pt was in her usual state of health until last night when she went to bed. Early this morning, pt woke up very confused and lethargic and did not recognize her significant as well as talking bizarre therefore brought to the ED for further eval. On exam, significantly decreased air entry bilaterally. Patient currently on BiPAP. Change settings to 20/5 as a repeat VBG showed  minimal improvement in pH and CO2. Abnormal Labs Na 134, CO2 35, Creat 1.36, Alk phos 132, Mg 1.8, VBG: Ph 7.166, pCO2 93.1, pO2 183, HCO3 32.9, O2 HGB 95.8.    Plan: repeat VBG in 2-3 hrs, Scheduled Xopenex and Atrovent Nebs tx, Solu-medrol IV, Hold Propranolol and all PO meds for now while on Bipap and unable to take PO. Monitor mental status, Keppra IV while pt unable to take PO.     Anticipated Length of Stay/Certification Statement:  Patient will be admitted on an inpatient basis with an anticipated length of stay of greater than 2 midnights secondary to acute hypercapnic respiratory failure.     Date: 5/27/25   Day 2:   Pt stable for discharge on this date. Pt originally presented to the hospital on 5/26/2025 due with increasing lethargy and confusion.  Patient was found to be in acute hypercapnic respiratory failure.  Patient was placed on BiPAP in the ED with improvement in hypercapnia and admission to medicine for further medical care.  On admission she was placed on standing nebulizer treatments and IV Solu-Medrol 40 mg every 8 hours due to concern of COPD exacerbation.  Patient O2 requirements improved and has been titrated down to her baseline 4 L nasal cannula.  Her mentation also improved back to baseline.  She is being discharged with prednisone 40 mg daily for 5 days for COPD exacerbation.     ED Treatment-Medication Administration from 05/26/2025 0409 to 05/26/2025 1021         Date/Time Order Dose Route Action     05/26/2025 0742 piperacillin-tazobactam (ZOSYN) IVPB 4.5 g 4.5 g Intravenous New Bag     05/26/2025 0710 iohexol (OMNIPAQUE) 350 MG/ML injection (SINGLE-DOSE) 100 mL 90 mL Intravenous Given            Scheduled Medications:  atorvastatin, 10 mg, Oral, Daily  famotidine, 20 mg, Oral, Daily  heparin (porcine), 5,000 Units, Subcutaneous, Q8H LINO  ipratropium, 0.5 mg, Nebulization, TID  levalbuterol, 1.25 mg, Nebulization, TID  levETIRAcetam, 750 mg, Oral, Q12H LINO  levothyroxine, 88 mcg,  Oral, Early Morning  pantoprazole, 40 mg, Oral, Q12H LINO  [START ON 5/28/2025] predniSONE, 40 mg, Oral, Daily  propranolol, 20 mg, Oral, Q12H  sertraline, 50 mg, Oral, Daily  valproic acid, 500 mg, Oral, Q6H LINO  warfarin, 3 mg, Oral, Daily (warfarin)  levETIRAcetam (KEPPRA) injection 750 mg  Dose: 750 mg  Freq: Every 12 hours scheduled Route: IV  Start: 05/26/25 1115 End: 05/26/25 1231  methylPREDNISolone sodium succinate (Solu-MEDROL) injection 40 mg  Dose: 40 mg  Freq: Every 8 hours scheduled Route: IV  Start: 05/26/25 1115 End: 05/27/25 1224  pantoprazole (PROTONIX) injection 40 mg  Dose: 40 mg  Freq: Every 12 hours scheduled Route: IV  Start: 05/26/25 1115 End: 05/27/25 1039    Continuous IV Infusions:  multi-electrolyte, 75 mL/hr, Intravenous, Continuous      PRN Meds:     ED Triage Vitals   Temperature Pulse Respirations Blood Pressure SpO2 Pain Score   05/26/25 0417 05/26/25 0416 05/26/25 0416 05/26/25 0416 05/26/25 0416 05/26/25 1058   97.9 °F (36.6 °C) 98 18 117/67 97 % No Pain     Weight (last 2 days)       Date/Time Weight    05/26/25 0930 107 (235.89)    05/26/25 0648 107 (235.89)            Vital Signs (last 3 days)       Date/Time Temp Pulse Resp BP MAP (mmHg) SpO2 FiO2 (%) Calculated FIO2 (%) - Nasal Cannula Nasal Cannula O2 Flow Rate (L/min) O2 Device O2 Interface Device Patient Position - Orthostatic VS Aldo Coma Scale Score Pain    05/27/25 1134 -- 84 -- 136/66 -- -- -- -- -- -- -- -- -- --    05/27/25 0900 -- -- -- -- -- -- -- -- -- -- -- -- -- No Pain    05/27/25 0841 99.4 °F (37.4 °C) 90 25 129/78 -- 96 % -- -- -- None (Room air) -- Lying -- --    05/27/25 0717 -- -- -- -- -- 100 % -- 28 2 L/min Nasal cannula -- -- -- --    05/27/25 0340 -- -- -- -- -- -- -- -- -- -- -- -- 14 --    05/27/25 0202 99.4 °F (37.4 °C) 88 19 123/63 78 97 % -- 28 2 L/min Nasal cannula -- Lying -- --    05/26/25 2340 -- -- -- -- -- -- -- -- -- -- -- -- 14 --    05/26/25 2205 98.3 °F (36.8 °C) 88 21 121/64 79 99 % --  28 2 L/min Nasal cannula -- Lying -- --    05/26/25 1944 98 °F (36.7 °C) 89 19 119/62 -- 95 % -- 28 2 L/min Nasal cannula -- Lying 14 --    05/26/25 1600 -- -- -- -- -- -- -- -- -- -- -- -- 11 --    05/26/25 1540 98.4 °F (36.9 °C) 98 -- 141/74 104 95 % -- 28 2 L/min Nasal cannula -- Lying -- --    05/26/25 1114 98.3 °F (36.8 °C) 100 20 112/84 -- 100 % -- 28 2 L/min Nasal cannula -- Lying -- --    05/26/25 1058 -- -- -- -- -- -- -- -- -- -- -- -- 11 No Pain    05/26/25 1042 98.3 °F (36.8 °C) 110 20 108/84 89 92 % -- -- -- -- -- Lying -- --    05/26/25 0954 -- -- -- -- -- -- -- -- -- -- Face mask -- -- --    05/26/25 0930 98.3 °F (36.8 °C) 120 22 109/61 77 94 % 35 -- -- BiPAP -- Lying 6 --    05/26/25 0900 -- 118 26 103/54 -- 93 % -- -- -- BiPAP -- Lying 6 --    05/26/25 0831 -- 116 22 -- -- 93 % -- -- -- BiPAP -- Lying 6 --    05/26/25 0800 -- 119 18 99/55 -- 94 % 35 -- -- BiPAP -- Lying 6 --    05/26/25 0745 -- 118 24 99/55 -- 90 % -- -- -- BiPAP -- Lying 6 --    05/26/25 0735 -- -- -- -- -- -- -- -- -- -- Face mask -- -- --    05/26/25 0716 -- -- -- -- -- -- 35 -- -- BiPAP Face mask -- -- --    05/26/25 0715 -- 119 18 119/56 80 91 % -- -- -- BiPAP -- Lying 6 --    05/26/25 0659 -- 119 26 136/62 -- 94 % -- -- -- Nasal cannula -- Lying 6 --    05/26/25 0420 -- -- -- -- -- -- -- -- -- Nasal cannula -- -- 14 --    05/26/25 0418 -- -- -- -- -- -- -- 32 3 L/min Nasal cannula -- -- -- --    05/26/25 0417 97.9 °F (36.6 °C) -- -- -- -- -- -- -- -- -- -- -- -- --    05/26/25 0416 -- 98 18 117/67 -- 97 % -- -- -- None (Room air) -- Lying -- --              Pertinent Labs/Diagnostic Test Results:   Radiology:  CTA stroke alert (head/neck)   Final Interpretation by Daniel Reinoso MD (05/26 2432)   CT angiogram of the head degraded by motion artifact, but no large vessel occlusion, high-grade stenosis, or intracranial aneurysm identified on CT angiogram of the head.      No hemodynamically significant stenosis or dissection  identified on CT angiogram of the neck.      Enlarged main pulmonary artery indicative of pulmonary hypertension.            Attempt was initially made to convey the results of the examination to the neuro on-call stroke service at 7:16 a.m. on 5/26/2025 using epic secure cat as well as by calling. Results subsequently conveyed to Dr. Washington at 7:27 a.m. on 5/26/2025.      Workstation performed: [id number]         CT stroke alert brain   Final Interpretation by Daniel Reinoso MD (05/26 0731)      No acute intracranial abnormality.      Initial attempt was made through secure epic chat as well as by calling, to reach the neuro stroke service on call at 7:05 a.m. on 5/26/2025. Results subsequently conveyed to Dr. Washington by Dr. Reinoso on 5/26/2025 at 7:27 a.m.      Workstation performed: [id number]         CT head without contrast   Final Interpretation by Isac Cortes MD (05/26 0597)      No acute intracranial abnormality.                  Workstation performed: [id number]         XR chest 1 view portable   Final Interpretation by Symone Fonseca MD (05/26 0222)      Mild pulmonary venous congestion.            Workstation performed: [id number]           Cardiology:  ECG 12 lead    by Interface, Ris Results In (05/26 0713)      ECG 12 lead    by Interface, Ris Results In (05/26 1953)              Results from last 7 days   Lab Units 05/26/25 0427   WBC Thousand/uL 12.17*   HEMOGLOBIN g/dL 11.0*   HEMATOCRIT % 37.0   PLATELETS Thousands/uL 280   TOTAL NEUT ABS Thousands/µL 10.47*         Results from last 7 days   Lab Units 05/26/25 0427   SODIUM mmol/L 134*   POTASSIUM mmol/L 4.4   CHLORIDE mmol/L 95*   CO2 mmol/L 35*   ANION GAP mmol/L 4   BUN mg/dL 23   CREATININE mg/dL 1.36*   EGFR ml/min/1.73sq m 40   CALCIUM mg/dL 9.8   MAGNESIUM mg/dL 1.8*     Results from last 7 days   Lab Units 05/26/25 0427   AST U/L 19   ALT U/L 11   ALK PHOS U/L 132*   TOTAL PROTEIN g/dL 8.0   ALBUMIN g/dL 3.6   TOTAL BILIRUBIN mg/dL  0.45     Results from last 7 days   Lab Units 05/26/25  0708 05/26/25  0422   POC GLUCOSE mg/dl 159* 164*     Results from last 7 days   Lab Units 05/26/25  0427   GLUCOSE RANDOM mg/dL 166*     Results from last 7 days   Lab Units 05/27/25  0145 05/26/25  1200 05/26/25  0910   PH CISCO  7.396 7.317 7.180*   PCO2 CISCO mm Hg 54.9* 61.4* 84.2*   PO2 CISCO mm Hg 189.0* 161.4* 87.8*   HCO3 CICSO mmol/L 32.9* 30.7* 30.7*   BASE EXC CISCO mmol/L 6.8 3.2 0.4   O2 CONTENT CISCO ml/dL 15.3 16.4 15.7   O2 HGB, VENOUS % 95.6* 96.3* 92.3*     Results from last 7 days   Lab Units 05/26/25  0910 05/26/25  0640 05/26/25  0427   HS TNI 0HR ng/L  --   --  15   HS TNI 2HR ng/L  --  18  --    HSTNI D2 ng/L  --  3  --    HS TNI 4HR ng/L 24  --   --    HSTNI D4 ng/L 9  --   --          Results from last 7 days   Lab Units 05/27/25  0516 05/26/25  0509 05/22/25  0000 05/21/25  0958   PROTIME seconds 24.5* 23.7*  --  13.4*   INR  2.18* 2.09* 1.30* 1.3*   PTT seconds  --  48*  --   --      Results from last 7 days   Lab Units 05/26/25  0427   LACTIC ACID mmol/L 1.2     Results from last 7 days   Lab Units 05/26/25  0509   CLARITY UA  Slightly Cloudy*   COLOR UA  Yellow   SPEC GRAV UA  1.015   PH UA  7.5   GLUCOSE UA mg/dl Negative   KETONES UA mg/dl Negative   BLOOD UA  2+*   PROTEIN UA mg/dl 2+*   NITRITE UA  Negative   BILIRUBIN UA  Negative   UROBILINOGEN UA E.U./dl 0.2   LEUKOCYTES UA  Negative   WBC UA /hpf 0-5   RBC UA /hpf 4-10*   BACTERIA UA /hpf Occasional   EPITHELIAL CELLS WET PREP /hpf Occasional   MUCUS THREADS  Occasional*           Results from last 7 days   Lab Units 05/26/25  0740 05/26/25  0428   BLOOD CULTURE  Received in Microbiology Lab. Culture in Progress. Received in Microbiology Lab. Culture in Progress.       Past Medical History[1]  Present on Admission:   Acute metabolic encephalopathy   Paroxysmal atrial fibrillation (HCC)   Hypothyroid   Seizure (HCC)      Admitting Diagnosis: Stroke (cerebrum) (HCC) [I63.9]  Goals of  care, counseling/discussion [Z71.89]  Stroke-like symptoms [R29.90]  Hypercapnic respiratory failure (HCC) [J96.92]  AMS (altered mental status) [R41.82]  Age/Sex: 65 y.o. female    Network Utilization Review Department  ATTENTION: Please call with any questions or concerns to [phone] and carefully listen to the prompts so that you are directed to the right person. All voicemails are confidential.   For Discharge needs, contact Care Management DC Support Team at [phone] opt. 2  Send all requests for admission clinical reviews, approved or denied determinations and any other requests to dedicated fax number below belonging to the campus where the patient is receiving treatment. List of dedicated fax numbers for the Facilities:  FACILITY NAME UR FAX NUMBER   ADMISSION DENIALS (Administrative/Medical Necessity) [phone]   DISCHARGE SUPPORT TEAM (NETWORK) [phone]   PARENT CHILD HEALTH (Maternity/NICU/Pediatrics) [phone]   [facility] [phone]   [facility] [phone]   [facility] [phone]   [facility] [phone]   [facility] [phone]   [facility] [phone]   [facility] [phone]   [facility] [phone]   [facility] [phone]   [facility] [phone]   General acute hospital [phone]   [facility] [phone]              [1]   Past Medical History:  Diagnosis Date    Anemia     Arthritis     Cancer of kidney (HCC)     Chronic kidney disease     Diabetes mellitus (HCC)     Disease of thyroid gland     HLD (hyperlipidemia)     Hypertension     Ovarian cancer (HCC)     Pneumonia

## 2025-05-27 NOTE — DISCHARGE SUMMARY
Discharge Summary - Hospitalist   Name: Adelina Soto 65 y.o. female I MRN: [id number]  Unit/Bed#: -01 I Date of Admission: 5/26/2025   Date of Service: 5/27/2025 I Hospital Day: 1     Assessment & Plan  Acute hypercapnic respiratory failure (HCC)  Possibly from underlying COPD with possible exacerbation.  Patient lethargic and unable to give any history.  Per patient's significant other, patient was confused.  Did not complain of shortness of breath.  On examination does have significantly decreased air entry bilaterally    Patient currently on BiPAP.  Change settings to 20/5 as a repeat VBG showed minimal improvement in pH and CO2.  Repeat VBG in 2 to 3 hours.  Had lengthy goals of care discussion with patient's significant other/MINDY Apodaca.  Patient is DNR/DNI.  After her previous hospitalization when she was on ventilator for a prolonged time, patient did not want to be back on the ventilator.  Explained to her the risks and she understood that if things do not improve on BiPAP, it could be fatal.  She understood and still wanted to respect patient's wishes of being DNR/DNI.  Started the patient on scheduled Xopenex and Atrovent and Solu-Medrol for possible COPD exacerbation.  O2 requirements improved down to baseline 4L NC on hospital day 1  Discharging with prednisone 40 mg daily for 5 days for copd exacerbation  COPD with possible exacerbation (HCC)  Please refer to above for details.  Paroxysmal atrial fibrillation (HCC)  Continue home propranolol and coumdain    Hypothyroid  Continue levothyroxine.  Acute metabolic encephalopathy  Secondary to hypercapnia.  Improved to baseline after BIPAP use  Seizure (HCC)  Continue home Keppra dosing on d/c   Morbid obesity due to excess calories (HCC)  Morbid obesity, as evidenced by BMI 40.49, treated with Regular diet and counseling for diet and lifestyle modifications.      Medical Problems       Resolved Problems  Date Reviewed: 5/20/2025   None    "    Discharging Physician / Practitioner: Husam John DO  PCP: TULIO Beaver  Admission Date:   Admission Orders (From admission, onward)       Ordered        05/26/25 0928  INPATIENT ADMISSION  Once                          Discharge Date: 05/27/25    Next Steps for Physician/AP Assuming Care:  Monitor o2 requirements    Test Results Pending at Discharge (will require follow up):  Blood cultures    Medication Changes for Discharge & Rationale:   Prednisone 40 mg daily for 5 days  See after visit summary for reconciled discharge medications provided to patient and/or family.     Consultations During Hospital Stay:  N/a    Procedures Performed:   none    Significant Findings / Test Results:   Hypercapnic respiratory failure     Incidental Findings:   See above       Hospital Course:   Adelina Soto is a 65 y.o. female patient who originally presented to the hospital on 5/26/2025 due with increasing lethargy and confusion.  Patient was found to be in acute hypercapnic respiratory failure.  Patient was placed on BiPAP in the ED with improvement in hypercapnia and admission to medicine for further medical care.  On admission she was placed on standing nebulizer treatments and IV Solu-Medrol 40 mg every 8 hours due to concern of COPD exacerbation.  Patient O2 requirements improved and has been titrated down to her baseline 4 L nasal cannula.  Her mentation also improved back to baseline.  She is being discharged with prednisone 40 mg daily for 5 days for COPD exacerbation.          Please see above list of diagnoses and related plan for additional information.     Discharge Day Visit / Exam:   Subjective:  Pt states she feels much better currently has no acute complaints  Vitals: Blood Pressure: 136/66 (05/27/25 1134)  Pulse: 84 (05/27/25 1134)  Temperature: 99.4 °F (37.4 °C) (05/27/25 0841)  Temp Source: Oral (05/27/25 0841)  Respirations: (!) 25 (05/27/25 0841)  Height: 5' 4\" (162.6 cm) (05/26/25 " 0930)  Weight - Scale: 107 kg (235 lb 14.3 oz) (05/26/25 0930)  SpO2: 98 % (05/27/25 1439)  Physical Exam  Vitals and nursing note reviewed.   Constitutional:       General: She is not in acute distress.     Appearance: She is well-developed.   HENT:      Head: Normocephalic and atraumatic.     Eyes:      Conjunctiva/sclera: Conjunctivae normal.       Cardiovascular:      Rate and Rhythm: Normal rate and regular rhythm.      Heart sounds: No murmur heard.  Pulmonary:      Effort: Pulmonary effort is normal. No respiratory distress.      Breath sounds: Normal breath sounds.   Abdominal:      Palpations: Abdomen is soft.      Tenderness: There is no abdominal tenderness.     Musculoskeletal:         General: No swelling.     Skin:     General: Skin is warm and dry.     Neurological:      Mental Status: She is alert. Mental status is at baseline.     Psychiatric:         Mood and Affect: Mood normal.          Discussion with Family: Updated  (daughter) at bedside.    Discharge instructions/Information to patient and family:   See after visit summary for information provided to patient and family.      Provisions for Follow-Up Care:  See after visit summary for information related to follow-up care and any pertinent home health orders.      Mobility at time of Discharge:   Basic Mobility Inpatient Raw Score: 10  JH-HLM Goal: 4: Move to chair/commode  JH-HLM Achieved: 2: Bed activities/Dependent transfer  HLM Goal NOT achieved. Continue to encourage mobility in post discharge setting.     Disposition:   Home with VNA Services (Reminder: Complete face to face encounter)    Planned Readmission: no    Administrative Statements   Discharge Statement:  I have spent a total time of 35 minutes in caring for this patient on the day of the visit/encounter. >30 minutes of time was spent on: Diagnostic results, Prognosis, Risks and benefits of tx options, Instructions for management, and Patient and family  education.    **Please Note: This note may have been constructed using a voice recognition system**

## 2025-05-27 NOTE — ASSESSMENT & PLAN NOTE
Morbid obesity, as evidenced by BMI 40.49, treated with Regular diet and counseling for diet and lifestyle modifications.

## 2025-05-27 NOTE — UTILIZATION REVIEW
NOTIFICATION OF INPATIENT ADMISSION   AUTHORIZATION REQUEST   SERVICING FACILITY:   [place] [address]  Tax ID: [id number]  NPI: [id number] ATTENDING PROVIDER:  Attending Name and NPI#: Husam John Do [[id number]]  Address: [address] [place] [address]  Phone:  [phone]     ADMISSION INFORMATION:  Place of Service: Inpatient Acute [facility]  Place of Service Code: 21  Inpatient Admission Date/Time: 5/26/25  9:28 AM  Discharge Date/Time: No discharge date for patient encounter.  Admitting Diagnosis Code/Description:  Stroke (cerebrum) (HCC) [I63.9]  Goals of care, counseling/discussion [Z71.89]  Stroke-like symptoms [R29.90]  Hypercapnic respiratory failure (HCC) [J96.92]  AMS (altered mental status) [R41.82]     UTILIZATION REVIEW CONTACT:  Clarita Mulligan, Utilization   Network Utilization Review Department  Phone: [phone]  Fax: [phone]  Email: [email or web address]  Contact for approvals/pending authorizations, clinical reviews, and discharge.     PHYSICIAN ADVISORY SERVICES:  Medical Necessity Denial & Bnwi-pj-Kuph Review  Phone: [phone]  Fax: [phone]  Email: [email or web address]     DISCHARGE SUPPORT TEAM:  For Patients Discharge Needs & Updates  Phone: [phone] opt. 2 Fax: [phone]  Email: [email or web address]

## 2025-05-27 NOTE — DISCHARGE INSTR - AVS FIRST PAGE
Dear Adelina,    You are being discharged with prednisone 40 mg daily for 5 days for COPD exacerbation.  Please follow up with your primary care doctor after discharge.    Best of luck,  Dr. Husam John

## 2025-05-27 NOTE — CASE MANAGEMENT
Case Management Assessment & Discharge Planning Note    Patient name Adelina Dunhamfield  Location /-01 MRN [id number]  : 1960 Date 2025       Current Admission Date: 2025  Current Admission Diagnosis:Acute hypercapnic respiratory failure (HCC)   Patient Active Problem List    Diagnosis Date Noted    Acute hypercapnic respiratory failure (HCC) 2025    COPD with possible exacerbation (HCC) 2025    Acute on chronic hypoxic respiratory failure (HCC) 11/15/2024    Seizure (HCC) 2024    Coagulopathy (HCC) 2024    Type 2 diabetes mellitus with hyperglycemia, unspecified whether long term insulin use (HCC) 2024    Chronic anticoagulation 2023    Chronic respiratory failure with hypercapnia (HCC) 2023    CKD (chronic kidney disease) 2023    Anemia in stage 3b chronic kidney disease  (HCC) 2023    Chronic respiratory failure with hypoxia (HCC) 2023    History of hysterectomy 2022    Acute metabolic encephalopathy 2022    Abnormal CT scan 2022    Bilateral pleural effusion 2022    Cardiomegaly 2022    Goals of care, counseling/discussion 2022    Depression, recurrent (HCC) 2022    Pre-ulcerative corn or callous 2022    Secondary hyperparathyroidism of renal origin (HCC) 2021    GERD (gastroesophageal reflux disease) 2020    Anxiety 2020    Chronic constipation 2020    Paroxysmal atrial fibrillation (HCC) 2020    COPD with asthma (HCC) 2020    Hypothyroid 2020    H/O right nephrectomy 09/10/2020    Dyslipidemia 2019    Hypertensive chronic kidney disease with stage 1 through stage 4 chronic kidney disease, or unspecified chronic kidney disease 10/04/2018    Persistent proteinuria 10/04/2018    Iron deficiency 10/04/2018      LOS (days): 1  Geometric Mean LOS (GMLOS) (days):   Days to GMLOS:     OBJECTIVE:    Risk of Unplanned Readmission  Score: 19.45         Current admission status: Inpatient       Preferred Pharmacy:   [facility]/pharmacy #[id number] - [place] - [address]  [address] [address]  Phone: [phone] Fax: [phone]    [facility] - [place], [place] - [address]  [address]  [address]  [facility] [address]  Phone: [phone] Fax: [phone]    Primary Care Provider: TULIO Beaver    Primary Insurance: [employer] [facility]  Secondary Insurance: [facility]    ASSESSMENT:  Active Health Care Proxies    There are no active Health Care Proxies on file.                      Patient Information  Admitted from:: Home  Assessment information provided by:: Other - please comment (SO/POA/Caregiver Constanza)  Primary Caregiver: Private caregiver  Caregiver's Name:: Constanza Allen  Caregiver's Telephone Number:: [phone]  Support Systems: Spouse/significant other  County of Residence: [place]  What city do you live in?: [place]  Home entry access options. Select all that apply.: Ramp  Type of Current Residence: Apartment  Upon entering residence, is there a bedroom on the main floor (no further steps)?: Yes  Upon entering residence, is there a bathroom on the main floor (no further steps)?: Yes  Living Arrangements: Lives w/ Spouse/significant other    Activities of Daily Living Prior to Admission  Functional Status: Assistance  Completes ADLs independently?: No  Level of ADL dependence: Assistance  Ambulates independently?: No  Level of ambulatory dependence: Assistance  Does patient use assisted devices?: Yes  Assisted Devices (DME) used: Portable Oxygen concentrator, Home Oxygen concentrator, Wheelchair, Walker  DME Company Name (respiratory supplies): [facility]  O2 Rate(s): 2L  Does patient currently own DME?: Yes  What DME does the patient currently own?: Home Oxygen concentrator, Portable Oxygen concentrator,  Wheelchair, Walker  Does the patient have a history of Short-Term Rehab?: No  Does patient have a history of HHC?: Yes  Does patient currently have HHC?: Yes ([facility])    Current Home Health Care  Type of Current Home Care Services: Home PT  Home Health Agency Name:: [facility]  Current Home Health Follow-Up Provider:: PCP    Patient Information Continued  Income Source: SSI/SSD  Does patient have prescription coverage?: Yes  Can the patient afford their medications and any related supplies (such as glucometers or test strips)?: Yes  Does patient receive dialysis treatments?: No  Does patient have a history of Mental Health Diagnosis?: No         Means of Transportation  Means of Transport to Appts:: Other (Comment) (medical transport)      Social Determinants of Health (SDOH)      Flowsheet Row Most Recent Value   Housing Stability    In the last 12 months, was there a time when you were not able to pay the mortgage or rent on time? N   In the past 12 months, how many times have you moved where you were living? 0   At any time in the past 12 months, were you homeless or living in a shelter (including now)? N   Transportation Needs    In the past 12 months, has lack of transportation kept you from medical appointments or from getting medications? no   In the past 12 months, has lack of transportation kept you from meetings, work, or from getting things needed for daily living? No   Food Insecurity    Within the past 12 months, you worried that your food would run out before you got the money to buy more. Never true   Within the past 12 months, the food you bought just didn't last and you didn't have money to get more. Never true   Utilities    In the past 12 months has the electric, gas, oil, or water company threatened to shut off services in your home? No            DISCHARGE DETAILS:    Discharge planning discussed with:: ANDREW Apodaca  [place] of Choice: Yes  Comments - Freedom of Choice: Constanza would like pt to  return home with resumption of [facility] at discharge.  CM contacted family/caregiver?: Yes  Were Treatment Team discharge recommendations reviewed with patient/caregiver?: Yes  Did patient/caregiver verbalize understanding of patient care needs?: Yes       Contacts  Patient Contacts: Constanza  Relationship to Patient:: Family  Contact Method: In Person  Reason/Outcome: Referral, Discharge Planning    Requested Home Health Care         Is the patient interested in HHC at discharge?: Yes  Home Health Discipline requested:: Physical Therapy  [facility]  Home Health Follow-Up Provider:: PCP  Home Health Services Needed:: Gait/ADL Training, Evaluate Functional Status and Safety, Strengthening/Theraputic Exercises to Improve Function  Homebound Criteria Met:: Requires the Assistance of Another Person for Safe Ambulation or to Leave the Home, Uses an Assist Device (i.e. cane, walker, etc), Requires Medical Transportation  Supporting Clincal Findings:: Bed Bound or Wheelchair Bound, Requires Oxygen, Limited Endurance    DME Referral Provided  Referral made for DME?: No    Other Referral/Resources/Interventions Provided:  Referral Comments: Met with pt and ANDREW Apodaca. Both live together in an apt with ramp entrance. Pt is dependent on ADLs and confined to bed/chair. Pt also has a private caregiver from 8:30-1 from Monday to Friday. Currently receiving skilled homecare through [facility] for home PT. Constanza would like pt to resume at discharge. referral placed. Pt is cleared for discharge today to return home with homecare services. Requesting BLS transport. Transport request put in for 2pm. Awaitng confirmation time.         Treatment Team Recommendation: Home with Home Health Care  Discharge Destination Plan:: Home with Home Health Care  Transport at Discharge : BLS Ambulance

## 2025-05-27 NOTE — ASSESSMENT & PLAN NOTE
Possibly from underlying COPD with possible exacerbation.  Patient lethargic and unable to give any history.  Per patient's significant other, patient was confused.  Did not complain of shortness of breath.  On examination does have significantly decreased air entry bilaterally    Patient currently on BiPAP.  Change settings to 20/5 as a repeat VBG showed minimal improvement in pH and CO2.  Repeat VBG in 2 to 3 hours.  Had lengthy goals of care discussion with patient's significant other/MINDY Apodaca.  Patient is DNR/DNI.  After her previous hospitalization when she was on ventilator for a prolonged time, patient did not want to be back on the ventilator.  Explained to her the risks and she understood that if things do not improve on BiPAP, it could be fatal.  She understood and still wanted to respect patient's wishes of being DNR/DNI.  Started the patient on scheduled Xopenex and Atrovent and Solu-Medrol for possible COPD exacerbation.  O2 requirements improved down to baseline 4L NC on hospital day 1  Discharging with prednisone 40 mg daily for 5 days for copd exacerbation

## 2025-05-28 ENCOUNTER — HOME CARE VISIT (OUTPATIENT)
Dept: HOME HEALTH SERVICES | Facility: HOME HEALTHCARE | Age: 65
End: 2025-05-28
Attending: STUDENT IN AN ORGANIZED HEALTH CARE EDUCATION/TRAINING PROGRAM
Payer: COMMERCIAL

## 2025-05-28 ENCOUNTER — TRANSITIONAL CARE MANAGEMENT (OUTPATIENT)
Dept: FAMILY MEDICINE CLINIC | Facility: CLINIC | Age: 65
End: 2025-05-28

## 2025-05-28 VITALS — DIASTOLIC BLOOD PRESSURE: 70 MMHG | OXYGEN SATURATION: 98 % | SYSTOLIC BLOOD PRESSURE: 121 MMHG | HEART RATE: 88 BPM

## 2025-05-28 LAB — BACTERIA UR CULT: NORMAL

## 2025-05-28 PROCEDURE — G0151 HHCP-SERV OF PT,EA 15 MIN: HCPCS

## 2025-05-28 NOTE — ASSESSMENT & PLAN NOTE
Affects antimicrobial dosing   Physician Transition of Care Summary  Invasive Cardiovascular Lab    Procedure Date: 5/28/2025  Attending:    Chyna Herrera - Primary  Resident/Fellow/Other Assistant: Surgeons and Role:  * No surgeons found with a matching role *    Indications:   Pre-op Diagnosis      * GARCIA (dyspnea on exertion) [R06.09]     * Coronary atherosclerosis [I25.10]    Post-procedure diagnosis:   Post-op Diagnosis     * GARCIA (dyspnea on exertion) [R06.09]     * Coronary atherosclerosis [I25.10]    Procedure(s):   [facility], With LV  29536 - RI CATH PLMT L HRT & ARTS W/NJX & ANGIO IMG S&I    Procedure Findings:   -Severe single main vessel disease [85% proximal-mid RCA and 70% mid RCA] and moderate to severe branch vessel disease [70% stenosis of the upper ramification branch of OM1] in a co-dominant system.  -LVEDP was normal at 12 mmHg.    Access of the Procedure:   6 Sinhala right radial artery, closed with TR band.    Complications:   None.    Stents/Implants:   None.    Anticoagulation/Antiplatelet Plan:   Heparin bolus for radial access.    Estimated Blood Loss:   5 mL    Anesthesia: Moderate Sedation Anesthesia Staff: No anesthesia staff entered.    Any Specimen(s) Removed:   Order Name Source Comment Collection Info Order Time   BASIC METABOLIC PANEL Blood, Venous  Collected By: Enid Vazquez RN 5/28/2025  7:16 AM     [facility]   Immediate        CBC Blood, Venous  Collected By: Enid Vazquez RN 5/28/2025  7:16 AM     Release result to MyChart   Immediate            Disposition:   -TR band removal and hydration per protocol.  -Patient with exertional dyspnea exertion and recent negative chemical nuclear stress test, she is also morbidly obese and likely deconditioned [resting heart rate in the 110s].  Will treat the RCA [co-dominant vessel] disease medically, if she fails then may consider RCA PCI.  -Further management as per referring cardiologist, Dr. Palumbo.    Electronically signed by: Carrie Herrera MD, 5/28/2025  8:50 AM

## 2025-05-28 NOTE — UTILIZATION REVIEW
NOTIFICATION OF ADMISSION DISCHARGE   This is a Notification of Discharge from [facility]. Please be advised that this patient has been discharge from our facility. Below you will find the admission and discharge date and time including the patient’s disposition.   UTILIZATION REVIEW CONTACT:  Utilization Review Assistants  Network Utilization Review Department  Phone: [phone] x carefully listen to the prompts. All voicemails are confidential.  Email: [email or web address]     ADMISSION INFORMATION  PRESENTATION DATE: 5/26/2025  4:10 AM  OBERVATION ADMISSION DATE: N/A  INPATIENT ADMISSION DATE: 5/26/25  9:28 AM   DISCHARGE DATE: 5/27/2025  5:16 PM   DISPOSITION:Home with Home Health Care    [facility] Utilization Review Department  ATTENTION: Please call with any questions or concerns to [phone] and carefully listen to the prompts so that you are directed to the right person. All voicemails are confidential.   For Discharge needs, contact Care Management DC Support Team at [phone] opt. 2  Send all requests for admission clinical reviews, approved or denied determinations and any other requests to dedicated fax number below belonging to the campus where the patient is receiving treatment. List of dedicated fax numbers for the Facilities:  FACILITY NAME UR FAX NUMBER   ADMISSION DENIALS (Administrative/Medical Necessity) [phone]   DISCHARGE SUPPORT TEAM ([facility]) [phone]   PARENT CHILD HEALTH (Maternity/NICU/Pediatrics) [phone]   [facility] [phone]   [facility] [phone]   [facility] [phone]   [facility] [phone]   [facility] [phone]   [facility] [phone]   [facility] [phone]   [facility] [phone]    [facility] [phone]   [facility] [phone]   [facility] [phone]   [facility] [phone]

## 2025-05-30 ENCOUNTER — HOME CARE VISIT (OUTPATIENT)
Dept: HOME HEALTH SERVICES | Facility: HOME HEALTHCARE | Age: 65
End: 2025-05-30
Payer: COMMERCIAL

## 2025-06-01 LAB
BACTERIA BLD CULT: NORMAL
BACTERIA BLD CULT: NORMAL

## 2025-06-02 ENCOUNTER — TELEPHONE (OUTPATIENT)
Age: 65
End: 2025-06-02

## 2025-06-02 NOTE — TELEPHONE ENCOUNTER
Constanza called to request a letter from the provider for the patient Air condition to avoid her to paid for that.     Please Advice and contact     She would like the letter to be mailed to her

## 2025-06-02 NOTE — TELEPHONE ENCOUNTER
"Called and spoke w pt to clarify -- spoke marlee dunn who stated that they will need a letter stating patient will need window AC unit due to her heart conditions/asthma. She plans on taking this letter to \"housing\" or whoever the  is. Please advise.   "

## 2025-06-02 NOTE — LETTER
June 4, 2025     Patient: Adelina Soto  YOB: 1960        To Housing:    Adelina Soto is under my professional care.  I attest that Adelina has a medical condition including respiratory (chronic respiratory failure with hypoxia ) and cardiovascular diagnosis which significantly impacts her health and safety.  I certify that an air conditioner is medically necessary and would directly impact her health and functioning and decrease future events of physical compromise.    If you have any questions or concerns, please don't hesitate to call.         Sincerely,          Caitie ANTUNEZ        CC: No Recipients

## 2025-06-03 ENCOUNTER — ANTICOAG VISIT (OUTPATIENT)
Dept: FAMILY MEDICINE CLINIC | Facility: CLINIC | Age: 65
End: 2025-06-03

## 2025-06-03 ENCOUNTER — HOME CARE VISIT (OUTPATIENT)
Dept: HOME HEALTH SERVICES | Facility: HOME HEALTHCARE | Age: 65
End: 2025-06-03
Payer: COMMERCIAL

## 2025-06-03 ENCOUNTER — RESULTS FOLLOW-UP (OUTPATIENT)
Dept: FAMILY MEDICINE CLINIC | Facility: CLINIC | Age: 65
End: 2025-06-03

## 2025-06-03 VITALS — HEART RATE: 108 BPM | OXYGEN SATURATION: 96 % | SYSTOLIC BLOOD PRESSURE: 108 MMHG | DIASTOLIC BLOOD PRESSURE: 64 MMHG

## 2025-06-03 LAB — INR PPP: 4.5 (ref 0.85–1.19)

## 2025-06-03 PROCEDURE — G0151 HHCP-SERV OF PT,EA 15 MIN: HCPCS

## 2025-06-03 NOTE — TELEPHONE ENCOUNTER
Spoke with Constanza and made aware of INR 4.5 as well as new dosing instructions. Flowsheet updated.

## 2025-06-03 NOTE — TELEPHONE ENCOUNTER
----- Message from TULIO Finney sent at 6/3/2025  8:51 AM EDT -----  Please have Mariam hold warfarin today and tommorrow. Continue 3mg coumadin m-w-f, 1mmg other days. INR on thurs 6/12 order faxed to [facility] by me  ----- Message -----  From: [employer] [facility] Results In  Sent: 6/3/2025   6:30 AM EDT  To: TULIO Finney

## 2025-06-03 NOTE — PROGRESS NOTES
INR 4.5. Patient to HOLD coumadin today and tomorrow. Then 3 mg of coumadin on M-W-F. 1 mg all other days.

## 2025-06-04 ENCOUNTER — HOME CARE VISIT (OUTPATIENT)
Dept: HOME HEALTH SERVICES | Facility: HOME HEALTHCARE | Age: 65
End: 2025-06-04
Payer: COMMERCIAL

## 2025-06-04 VITALS — DIASTOLIC BLOOD PRESSURE: 78 MMHG | HEART RATE: 90 BPM | SYSTOLIC BLOOD PRESSURE: 138 MMHG | OXYGEN SATURATION: 98 %

## 2025-06-04 PROCEDURE — G0151 HHCP-SERV OF PT,EA 15 MIN: HCPCS

## 2025-06-04 NOTE — ED PROVIDER NOTES
Time reflects when diagnosis was documented in both MDM as applicable and the Disposition within this note       Time User Action Codes Description Comment    5/26/2025  6:54 AM Zeeshan Chawla Add [I63.9] Stroke (cerebrum) (HCC)     5/26/2025  9:27 AM Remi Zamorano [J96.92] Hypercapnic respiratory failure (HCC)     5/26/2025  9:27 AM Remi Zamorano Modify [I63.9] Stroke (cerebrum) (HCC)     5/26/2025  9:27 AM Remi Zamorano Modify [J96.92] Hypercapnic respiratory failure (HCC)     5/26/2025  9:27 AM Remi Zamorano [R29.90] Stroke-like symptoms     5/26/2025  9:28 AM Remi Zamorano [Z71.89] Goals of care, counseling/discussion     5/27/2025 12:27 PM Husam John [J44.1] COPD with possible exacerbation (HCC)     5/27/2025 12:38 PM Husam John [G93.41] Acute metabolic encephalopathy     5/27/2025 12:38 PM Husam John [J96.11] Chronic respiratory failure with hypoxia (HCC)     5/27/2025 12:38 PM Husam John [J96.12] Chronic respiratory failure with hypercapnia (HCC)           ED Disposition       ED Disposition   Admit    Condition   Stable    Date/Time   Mon May 26, 2025  9:28 AM    Comment   Case was discussed with Dr. Zuniga and the patient's admission status was agreed to be Admission Status: inpatient status to the service of Dr. Zuniga.               Assessment & Plan       Medical Decision Making  65-year-old female presenting via EMS for altered mental status per her partner who cares for her she is normally alert and oriented x 3 and today in the afternoon began hallucinating seeing things that were not there and became disoriented.  She is bedbound.  She is chronically on oxygen for COPD.  Patient is unable to provide any history.  She is alert and oriented x 0 on arrival and very distractible but she is awake and does not appear to be in acute distress.  Normal vitals on arrival.  Chest x-ray shows no acute disease per my  independent or potation.  Screening labs notable for mild nonspecific leukocytosis, baseline BMP.  UA not consistent with infection.  Normal lactate.  Patient given IV Zosyn due to concern for infection although no clear source has been identified.  VBG was obtained later in the workup due to lack of clear source of altered mental status.  On this patient was noted to be acidemic and hypercarbic.  Patient also became increasingly altered in the ED.  Nursing staff was concerned that she had developed a facial droop so stroke alert was called and a CTA head neck was obtained.  On my evaluation of the patient following the CTA she appears to be generally obtunded without obvious lateralizing symptom but not following commands or waking up appropriately and with agonal respirations.  Patient was placed on BiPAP.  Patient's partner was at bedside at this time.  She is the patient's legal POA per documents in the chart.  She states that after patient's hospitalization last fall she wishes to be DNR/DNI due to not wanting to be intubated again or having to be in ICU for a long time.  She is okay with patient receiving care otherwise and receiving a trial of BiPAP.  She understands that the patient could die from respiratory failure.  Patient appears mildly improved to stable while on BiPAP but still obtunded.  Given patient's goals of care with no plan to escalate patient was admitted to [facility] here for continued management.    Amount and/or Complexity of Data Reviewed  Labs: ordered.  Radiology: ordered.    Risk  Prescription drug management.  Decision regarding hospitalization.             Medications   piperacillin-tazobactam (ZOSYN) IVPB 4.5 g (0 g Intravenous Stopped 5/26/25 0830)   iohexol (OMNIPAQUE) 350 MG/ML injection (SINGLE-DOSE) 100 mL (90 mL Intravenous Given 5/26/25 0710)       ED Risk Strat Scores                    No data recorded                            History of Present Illness       Chief Complaint    Patient presents with    Altered Mental Status     Patient arrived via ambulance from home. Ems report patient having increased confusion/hallucinations that started tonight. Hx of frequent UTI's.        Past Medical History[1]   Past Surgical History[2]   Family History[3]   Social History[4]   E-Cigarette/Vaping    E-Cigarette Use Never User       E-Cigarette/Vaping Substances    Nicotine No     THC Yes     CBD No     Flavoring No       I have reviewed and agree with the history as documented.     65-year-old female presenting via EMS for altered mental status per her partner who cares for her she is normally alert and oriented x 3 and today in the afternoon began hallucinating seeing things that were not there and became disoriented.  She is bedbound.  She is chronically on oxygen for COPD.  Patient is unable to provide any history.         Review of Systems   Unable to perform ROS: Mental status change           Objective       ED Triage Vitals   Temperature Pulse Blood Pressure Respirations SpO2 Patient Position - Orthostatic VS   05/26/25 0417 05/26/25 0416 05/26/25 0416 05/26/25 0416 05/26/25 0416 05/26/25 0416   97.9 °F (36.6 °C) 98 117/67 18 97 % Lying      Temp Source Heart Rate Source BP Location FiO2 (%) Pain Score    05/26/25 0416 05/26/25 0416 05/26/25 0416 05/26/25 0716 05/26/25 1058    Oral Monitor Right arm 35 No Pain      Vitals      Date and Time Temp Pulse SpO2 Resp BP Pain Score FACES Pain Rating User   05/27/25 1439 -- -- 98 % -- -- -- -- LT   05/27/25 1134 -- 84 -- -- 136/66 -- -- JF   05/27/25 0900 -- -- -- -- -- No Pain -- JF   05/27/25 0841 99.4 °F (37.4 °C) 90 96 % 25 129/78 -- -- JI   05/27/25 0717 -- -- 100 % -- -- -- -- LT   05/27/25 0202 -- 88 -- -- -- -- -- HS   05/27/25 0202 99.4 °F (37.4 °C) -- 97 % 19 123/63 -- -- FV   05/26/25 2205 -- -- -- 21 -- -- -- HS   05/26/25 2205 98.3 °F (36.8 °C) 88 99 % -- 121/64 -- -- BA   05/26/25 1944 -- -- 95 % -- -- -- -- [facility]   05/26/25 1944 98 °F  (36.7 °C) 89 -- 19 119/62 -- -- HS   05/26/25 1540 98.4 °F (36.9 °C) 98 95 % -- 141/74 -- -- BA   05/26/25 1114 98.3 °F (36.8 °C) 100 -- 20 112/84 -- -- RG   05/26/25 1114 -- -- 100 % -- -- -- -- LT   05/26/25 1058 -- -- -- -- -- No Pain -- RG   05/26/25 1042 98.3 °F (36.8 °C) 110 92 % 20 108/84 -- -- RG   05/26/25 0930 98.3 °F (36.8 °C) 120 94 % 22 109/61 -- -- FN   05/26/25 0900 -- 118 93 % 26 103/54 -- -- FN   05/26/25 0831 -- 116 93 % 22 -- -- -- FN   05/26/25 0800 -- 119 94 % 18 99/55 -- -- FN   05/26/25 0745 -- 118 90 % 24 99/55 -- -- FN   05/26/25 0715 -- 119 91 % 18 119/56 -- -- FN   05/26/25 0659 -- 119 94 % 26 136/62 -- -- FN   05/26/25 0417 97.9 °F (36.6 °C) -- -- -- -- -- -- [facility]   05/26/25 0416 -- 98 97 % 18 117/67 -- -- [facility]            Physical Exam  Constitutional:       General: She is not in acute distress.     Comments: Confused, not answering questions appropriately   HENT:      Head: Normocephalic and atraumatic.     Cardiovascular:      Rate and Rhythm: Normal rate and regular rhythm.   Pulmonary:      Effort: Pulmonary effort is normal.      Breath sounds: Normal breath sounds.     Musculoskeletal:      Cervical back: Neck supple.     Skin:     General: Skin is warm and dry.     Neurological:      Mental Status: She is disoriented and confused.      Cranial Nerves: No cranial nerve deficit.         Results Reviewed       Procedure Component Value Units Date/Time    Blood culture #1 [[id number]] Collected: 05/26/25 0740    Lab Status: Final result Specimen: Blood from Arm, Right Updated: 06/01/25 0201     Blood Culture No Growth After 5 Days.    Blood culture #2 [[id number]] Collected: 05/26/25 0428    Lab Status: Final result Specimen: Blood from Arm, Left Updated: 06/01/25 0201     Blood Culture No Growth After 5 Days.    Urine culture [[id number]] Collected: 05/26/25 0511    Lab Status: Final result Specimen: Urine, Indwelling Stringer Catheter Updated: 05/28/25 0922     Urine Culture No Growth <1000  cfu/mL    HS Troponin I 4hr [[id number]]  (Normal) Collected: 05/26/25 0910    Lab Status: Final result Specimen: Blood from Arm, Left Updated: 05/26/25 0945     hs TnI 4hr 24 ng/L      Delta 4hr hsTnI 9 ng/L     Blood gas, venous [[id number]]  (Abnormal) Collected: 05/26/25 0910    Lab Status: Final result Specimen: Blood from Arm, Left Updated: 05/26/25 0928     pH, Shayan 7.180     pCO2, Shayan 84.2 mm Hg      pO2, Shayan 87.8 mm Hg      HCO3, Shayan 30.7 mmol/L      Base Excess, Shayan 0.4 mmol/L      O2 Content, Shayan 15.7 ml/dL      O2 HGB, VENOUS 92.3 %     Fingerstick Glucose (POCT) [[id number]]  (Abnormal) Collected: 05/26/25 0708    Lab Status: Final result Specimen: Blood Updated: 05/26/25 0713     POC Glucose 159 mg/dl     HS Troponin I 2hr [[id number]]  (Normal) Collected: 05/26/25 0640    Lab Status: Final result Specimen: Blood from Arm, Left Updated: 05/26/25 0708     hs TnI 2hr 18 ng/L      Delta 2hr hsTnI 3 ng/L     Blood gas, venous [[id number]]  (Abnormal) Collected: 05/26/25 0626    Lab Status: Final result Specimen: Blood from Arm, Left Updated: 05/26/25 0702     pH, Shayan 7.166     pCO2, Shayan 93.1 mm Hg      pO2, Shayan 183.0 mm Hg      HCO3, Shayan 32.9 mmol/L      Base Excess, Shayan 1.8 mmol/L      O2 Content, Shayan 17.0 ml/dL      O2 HGB, VENOUS 95.8 %     Urine Microscopic [[id number]]  (Abnormal) Collected: 05/26/25 0509    Lab Status: Final result Specimen: Urine, Indwelling Stringer Catheter Updated: 05/26/25 0535     RBC, UA 4-10 /hpf      WBC, UA 0-5 /hpf      Epithelial Cells Occasional /hpf      Bacteria, UA Occasional /hpf      MUCUS THREADS Occasional    Protime-INR [[id number]]  (Abnormal) Collected: 05/26/25 0509    Lab Status: Final result Specimen: Blood from Arm, Left Updated: 05/26/25 0525     Protime 23.7 seconds      INR 2.09    Narrative:      INR Therapeutic Range    Indication                                             INR Range      Atrial Fibrillation                                                2.0-3.0  Hypercoagulable State                                    2.0.2.3  Left Ventricular Asist Device                            2.0-3.0  Mechanical Heart Valve                                  -    Aortic(with afib, MI, embolism, HF, LA enlargement,    and/or coagulopathy)                                     2.0-3.0 (2.5-3.5)     Mitral                                                             2.5-3.5  Prosthetic/Bioprosthetic Heart Valve               2.0-3.0  Venous thromboembolism (VTE: VT, PE        2.0-3.0    APTT [[id number]]  (Abnormal) Collected: 05/26/25 0509    Lab Status: Final result Specimen: Blood from Arm, Left Updated: 05/26/25 0525     PTT 48 seconds     UA (URINE) with reflex to Scope [[id number]]  (Abnormal) Collected: 05/26/25 0509    Lab Status: Final result Specimen: Urine, Indwelling Stringer Catheter Updated: 05/26/25 0520     Color, UA Yellow     Clarity, UA Slightly Cloudy     Specific Gravity, UA 1.015     pH, UA 7.5     Leukocytes, UA Negative     Nitrite, UA Negative     Protein, UA 2+ mg/dl      Glucose, UA Negative mg/dl      Ketones, UA Negative mg/dl      Urobilinogen, UA 0.2 E.U./dl      Bilirubin, UA Negative     Occult Blood, UA 2+    HS Troponin 0hr (reflex protocol) [[id number]]  (Normal) Collected: 05/26/25 0427    Lab Status: Final result Specimen: Blood from Arm, Left Updated: 05/26/25 0500     hs TnI 0hr 15 ng/L     FLU/COVID Rapid Antigen (30 min. TAT) - Preferred screening test in ED [[id number]]  (Normal) Collected: 05/26/25 0427    Lab Status: Final result Specimen: Nares from Nose Updated: 05/26/25 0459     SARS COV Rapid Antigen Negative     Influenza A Rapid Antigen Negative     Influenza B Rapid Antigen Negative    Narrative:      This test has been performed using the [employer] Isabel 2 FLU+SARS Antigen test under the Emergency Use Authorization (EUA). This test has been validated by the  and verified by the performing laboratory. The Isabel uses lateral flow  immunofluorescent sandwich assay to detect SARS-COV, Influenza A and Influenza B Antigen.     The [employer] Isabel 2 SARS Antigen test does not differentiate between SARS-CoV and SARS-CoV-2.     Negative results are presumptive and may be confirmed with a molecular assay, if necessary, for patient management. Negative results do not rule out SARS-CoV-2 or influenza infection and should not be used as the sole basis for treatment or patient management decisions. A negative test result may occur if the level of antigen in a sample is below the limit of detection of this test.     Positive results are indicative of the presence of viral antigens, but do not rule out bacterial infection or co-infection with other viruses.     All test results should be used as an adjunct to clinical observations and other information available to the provider.    FOR PEDIATRIC PATIENTS - copy/paste COVID Guidelines URL to browser: [email or web address]    Lactic acid, plasma (w/reflex if result > 2.0) [[id number]]  (Normal) Collected: 05/26/25 0427    Lab Status: Final result Specimen: Blood from Arm, Left Updated: 05/26/25 0456     LACTIC ACID 1.2 mmol/L     Narrative:      Result may be elevated if tourniquet was used during collection.    Comprehensive metabolic panel [[id number]]  (Abnormal) Collected: 05/26/25 0427    Lab Status: Final result Specimen: Blood from Arm, Left Updated: 05/26/25 0456     Sodium 134 mmol/L      Potassium 4.4 mmol/L      Chloride 95 mmol/L      CO2 35 mmol/L      ANION GAP 4 mmol/L      BUN 23 mg/dL      Creatinine 1.36 mg/dL      Glucose 166 mg/dL      Calcium 9.8 mg/dL      AST 19 U/L      ALT 11 U/L      Alkaline Phosphatase 132 U/L      Total Protein 8.0 g/dL      Albumin 3.6 g/dL      Total Bilirubin 0.45 mg/dL      eGFR 40 ml/min/1.73sq m     Narrative:      National Kidney Disease Foundation guidelines for Chronic Kidney Disease (CKD):     Stage 1 with  normal or high GFR (GFR > 90 mL/min/1.73 square meters)    Stage 2 Mild CKD (GFR = 60-89 mL/min/1.73 square meters)    Stage 3A Moderate CKD (GFR = 45-59 mL/min/1.73 square meters)    Stage 3B Moderate CKD (GFR = 30-44 mL/min/1.73 square meters)    Stage 4 Severe CKD (GFR = 15-29 mL/min/1.73 square meters)    Stage 5 End Stage CKD (GFR <15 mL/min/1.73 square meters)  Note: GFR calculation is accurate only with a steady state creatinine    Magnesium [[id number]]  (Abnormal) Collected: 05/26/25 0427    Lab Status: Final result Specimen: Blood from Arm, Left Updated: 05/26/25 0456     Magnesium 1.8 mg/dL     CBC and differential [[id number]]  (Abnormal) Collected: 05/26/25 0427    Lab Status: Final result Specimen: Blood from Arm, Left Updated: 05/26/25 0438     WBC 12.17 Thousand/uL      RBC 3.46 Million/uL      Hemoglobin 11.0 g/dL      Hematocrit 37.0 %       fL      MCH 31.8 pg      MCHC 29.7 g/dL      RDW 14.3 %      MPV 9.2 fL      Platelets 280 Thousands/uL      nRBC 0 /100 WBCs      Segmented % 87 %      Immature Grans % 1 %      Lymphocytes % 6 %      Monocytes % 6 %      Eosinophils Relative 0 %      Basophils Relative 0 %      Absolute Neutrophils 10.47 Thousands/µL      Absolute Immature Grans 0.13 Thousand/uL      Absolute Lymphocytes 0.77 Thousands/µL      Absolute Monocytes 0.72 Thousand/µL      Eosinophils Absolute 0.04 Thousand/µL      Basophils Absolute 0.04 Thousands/µL     Fingerstick Glucose (POCT) [[id number]]  (Abnormal) Collected: 05/26/25 0422    Lab Status: Final result Specimen: Blood Updated: 05/26/25 0424     POC Glucose 164 mg/dl             CTA stroke alert (head/neck)   Final Interpretation by Daniel Reinoso MD (05/26 0732)   CT angiogram of the head degraded by motion artifact, but no large vessel occlusion, high-grade stenosis, or intracranial aneurysm identified on CT angiogram of the head.      No hemodynamically significant stenosis or dissection identified on CT angiogram of the  neck.      Enlarged main pulmonary artery indicative of pulmonary hypertension.            Attempt was initially made to convey the results of the examination to the neuro on-call stroke service at 7:16 a.m. on 5/26/2025 using epic secure cat as well as by calling. Results subsequently conveyed to Dr. Washingtno at 7:27 a.m. on 5/26/2025.      Workstation performed: [id number]         CT stroke alert brain   Final Interpretation by Daniel Reinoso MD (05/26 0761)      No acute intracranial abnormality.      Initial attempt was made through secure epic chat as well as by calling, to reach the neuro stroke service on call at 7:05 a.m. on 5/26/2025. Results subsequently conveyed to Dr. Washington by Dr. Reinoso on 5/26/2025 at 7:27 a.m.      Workstation performed: [id number]         CT head without contrast   Final Interpretation by Isac Cortes MD (05/26 0068)      No acute intracranial abnormality.                  Workstation performed: [id number]         XR chest 1 view portable   Final Interpretation by Symone Fonseca MD (05/26 6493)      Mild pulmonary venous congestion.            Workstation performed: [id number]             Procedures    ED Medication and Procedure Management   Prior to Admission Medications   Prescriptions Last Dose Informant Patient Reported? Taking?   Blood Glucose Monitoring Suppl (Accu-Chek Guide) w/Device KIT  Self No No   Sig: Use daily before breakfast   Patient not taking: Reported on 4/29/2025   LORazepam (ATIVAN) 0.5 mg tablet  Self No No   Sig: Take 1 tablet (0.5 mg total) by mouth 2 (two) times a day as needed for anxiety   OneTouch Delica Lancets 33G MISC  Self No No   Sig: Check blood sugars once daily. Please substitute with appropriate alternative as covered by patient's insurance. Dx: E11.65   Patient not taking: Reported on 4/29/2025   albuterol (2.5 mg/3 mL) 0.083 % nebulizer solution  Self No No   Sig: Take 3 mL (2.5 mg total) by nebulization every 6 (six) hours as needed for  wheezing or shortness of breath   albuterol (PROVENTIL HFA,VENTOLIN HFA) 90 mcg/act inhaler  Self No No   Sig: INHALE 1 PUFF EVERY 4 HOURS AS NEEDED FOR WHEEZING.   atorvastatin (LIPITOR) 10 mg tablet  Self No No   Sig: TAKE 1 TABLET BY MOUTH EVERY DAY   cholecalciferol (VITAMIN D3) 400 units tablet  Self No No   Sig: Take 1 tablet (400 Units total) by mouth daily   Patient taking differently: Take 400 Units by mouth in the morning. Pt takes 1000 iu daily.   famotidine (PEPCID) 20 mg tablet  Self No No   Sig: TAKE 1 TABLET BY MOUTH EVERY DAY   furosemide (LASIX) 40 mg tablet  Self No No   Sig: Take 1 tablet (40 mg total) by mouth daily   Patient not taking: Reported on 12/10/2024   glucose blood (OneTouch Verio) test strip  Self No No   Sig: Use 1 each daily before breakfast Use as instructed   Patient not taking: Reported on 4/29/2025   ipratropium (ATROVENT) 0.02 % nebulizer solution  Self No No   Sig: USE 1 VIAL IN NEBULIZER 4 TIMES DAILY   ipratropium-albuterol (DUO-NEB) 0.5-2.5 mg/3 mL nebulizer solution  Self No No   Sig: Take 3 mL by nebulization every 8 (eight) hours as needed for wheezing or shortness of breath   Patient not taking: Reported on 12/10/2024   levETIRAcetam (KEPPRA) 750 mg tablet  Self No No   Sig: Take 1 tablet (750 mg total) by mouth every 12 (twelve) hours   levothyroxine 88 mcg tablet  Self No No   Sig: Take 1 tablet (88 mcg total) by mouth daily in the early morning   nystatin (MYCOSTATIN) cream   No No   Sig: Apply topically 2 (two) times a day   Patient taking differently: Apply 1 Application topically in the morning and 1 Application in the evening. Use under breasts and abdominal folds when needed for fungal infection.   oxygen gas   Yes No   Sig: Inhale 2 L/min continuous. Via nasal cannula  Patient presently using 3 L   pantoprazole (PROTONIX) 40 mg tablet  Self No No   Sig: Take 1 tablet (40 mg total) by mouth daily   propranolol (INDERAL) 20 mg tablet  Self No No   Sig: Take 1  tablet (20 mg total) by mouth every 12 (twelve) hours   sertraline (ZOLOFT) 50 mg tablet  Self No No   Sig: TAKE 1 TABLET BY MOUTH EVERY DAY   valproic acid (DEPAKENE) 250 MG/5ML soln  Self No No   Sig: TAKE 10 ML (500 MG TOTAL) BY MOUTH EVERY 6 (SIX) HOURS   warfarin (COUMADIN) 3 mg tablet   No No   Sig: TAKE 1 TABLET BY MOUTH DAILY   Patient not taking: Reported on 5/28/2025   warfarin (Coumadin) 1 mg tablet   No No   Sig: Take 1 tablet (1 mg total) by mouth daily Monday,Wednesday, Friday 3 mg Sunday, Saturday, Tuesday, Thursday 1 mg   Patient taking differently: Take 3 tablets by mouth in the evening. Monday through Saturday 3 mg   Sunday 6 mg.      Facility-Administered Medications: None     Discharge Medication List as of 5/27/2025  1:11 PM        START taking these medications    Details   predniSONE 20 mg tablet Take 2 tablets (40 mg total) by mouth daily for 5 doses Do not start before May 28, 2025., Starting Wed 5/28/2025, Until Mon 6/2/2025, Normal           CONTINUE these medications which have NOT CHANGED    Details   albuterol (2.5 mg/3 mL) 0.083 % nebulizer solution Take 3 mL (2.5 mg total) by nebulization every 6 (six) hours as needed for wheezing or shortness of breath, Starting Fri 5/2/2025, Print      albuterol (PROVENTIL HFA,VENTOLIN HFA) 90 mcg/act inhaler INHALE 1 PUFF EVERY 4 HOURS AS NEEDED FOR WHEEZING., Normal      atorvastatin (LIPITOR) 10 mg tablet TAKE 1 TABLET BY MOUTH EVERY DAY, Starting Thu 4/17/2025, Normal      Blood Glucose Monitoring Suppl (Accu-Chek Guide) w/Device KIT Use daily before breakfast, Starting Mon 12/16/2024, Normal      cholecalciferol (VITAMIN D3) 400 units tablet Take 1 tablet (400 Units total) by mouth daily, Starting Mon 11/20/2023, Normal      famotidine (PEPCID) 20 mg tablet TAKE 1 TABLET BY MOUTH EVERY DAY, Starting Tue 4/15/2025, Normal      furosemide (LASIX) 40 mg tablet Take 1 tablet (40 mg total) by mouth daily, Starting Wed 2/15/2023, Until Tue  12/10/2024, Normal      glucose blood (OneTouch Verio) test strip Use 1 each daily before breakfast Use as instructed, Starting Tue 3/25/2025, Normal      ipratropium (ATROVENT) 0.02 % nebulizer solution USE 1 VIAL IN NEBULIZER 4 TIMES DAILY, Normal      ipratropium-albuterol (DUO-NEB) 0.5-2.5 mg/3 mL nebulizer solution Take 3 mL by nebulization every 8 (eight) hours as needed for wheezing or shortness of breath, Starting Wed 7/26/2023, Normal      levETIRAcetam (KEPPRA) 750 mg tablet Take 1 tablet (750 mg total) by mouth every 12 (twelve) hours, Starting Tue 4/29/2025, Normal      levothyroxine 88 mcg tablet Take 1 tablet (88 mcg total) by mouth daily in the early morning, Starting Tue 3/25/2025, Normal      LORazepam (ATIVAN) 0.5 mg tablet Take 1 tablet (0.5 mg total) by mouth 2 (two) times a day as needed for anxiety, Starting Mon 11/4/2024, Normal      nystatin (MYCOSTATIN) cream Apply topically 2 (two) times a day, Starting Mon 9/18/2023, Normal      OneTouch Delica Lancets 33G MISC Check blood sugars once daily. Please substitute with appropriate alternative as covered by patient's insurance. Dx: E11.65, Normal      oxygen gas Inhale 2 L/min continuous. Via nasal cannula  Patient presently using 3 L, Historical Med      pantoprazole (PROTONIX) 40 mg tablet Take 1 tablet (40 mg total) by mouth daily, Starting Thu 4/25/2024, Normal      propranolol (INDERAL) 20 mg tablet Take 1 tablet (20 mg total) by mouth every 12 (twelve) hours, Starting Tue 3/25/2025, Normal      sertraline (ZOLOFT) 50 mg tablet TAKE 1 TABLET BY MOUTH EVERY DAY, Starting Tue 4/15/2025, Normal      valproic acid (DEPAKENE) 250 MG/5ML soln TAKE 10 ML (500 MG TOTAL) BY MOUTH EVERY 6 (SIX) HOURS, Starting Thu 5/1/2025, Normal      !! warfarin (Coumadin) 1 mg tablet Take 1 tablet (1 mg total) by mouth daily Monday,Wednesday, Friday 3 mg Sunday, Saturday, Tuesday, Thursday 1 mg, Starting Mon 12/9/2024, Normal      !! warfarin (COUMADIN) 3 mg  tablet TAKE 1 TABLET BY MOUTH DAILY, Starting Mon 4/28/2025, Normal       !! - Potential duplicate medications found. Please discuss with provider.          ED SEPSIS DOCUMENTATION   Time reflects when diagnosis was documented in both MDM as applicable and the Disposition within this note       Time User Action Codes Description Comment    5/26/2025  6:54 AM Zeeshan Chawla [I63.9] Stroke (cerebrum) (HCC)     5/26/2025  9:27 AM Remi Zamorano Add [J96.92] Hypercapnic respiratory failure (HCC)     5/26/2025  9:27 AM Remi Zamorano Modify [I63.9] Stroke (cerebrum) (HCC)     5/26/2025  9:27 AM Remi Zamorano Modify [J96.92] Hypercapnic respiratory failure (HCC)     5/26/2025  9:27 AM Remi Zamorano Add [R29.90] Stroke-like symptoms     5/26/2025  9:28 AM Remi Zamorano [Z71.89] Goals of care, counseling/discussion     5/27/2025 12:27 PM Husam John [J44.1] COPD with possible exacerbation (HCC)     5/27/2025 12:38 PM Husam John [G93.41] Acute metabolic encephalopathy     5/27/2025 12:38 PM Husam John Add [J96.11] Chronic respiratory failure with hypoxia (HCC)     5/27/2025 12:38 PM Husam John Add [J96.12] Chronic respiratory failure with hypercapnia (HCC)                    [1]   Past Medical History:  Diagnosis Date    Anemia     Arthritis     Cancer of kidney (HCC)     Chronic kidney disease     Diabetes mellitus (HCC)     Disease of thyroid gland     HLD (hyperlipidemia)     Hypertension     Ovarian cancer (HCC)     Pneumonia    [2]   Past Surgical History:  Procedure Laterality Date    CHOLECYSTECTOMY      CT GUIDED PERC DRAINAGE CATHETER PLACEMENT  5/16/2016    FL LUMBAR PUNCTURE DIAGNOSTIC  11/19/2024    GALLBLADDER SURGERY  05/01/2004    HYSTERECTOMY  06/01/2018    NEPHRECTOMY Right 08/02/2018   [3]   Family History  Problem Relation Name Age of Onset    No Known Problems Mother      No Known Problems Father     [4]   Social History  Tobacco Use     Smoking status: Former     Current packs/day: 0.00     Average packs/day: 3.0 packs/day for 30.0 years (90.0 ttl pk-yrs)     Types: Cigarettes     Start date: 10/22/1980     Quit date: 10/22/2010     Years since quittin.6    Smokeless tobacco: Former     Quit date: 2011    Tobacco comments:     quit approx. 9 years ago   Vaping Use    Vaping status: Never Used   Substance Use Topics    Alcohol use: Never     Comment: n/a    Drug use: Yes     Types: Marijuana     Comment: smokes with girlfriend when anxious        Ginette Washington MD  [phone]

## 2025-06-06 ENCOUNTER — HOME CARE VISIT (OUTPATIENT)
Dept: HOME HEALTH SERVICES | Facility: HOME HEALTHCARE | Age: 65
End: 2025-06-06
Payer: COMMERCIAL

## 2025-06-06 ENCOUNTER — TELEPHONE (OUTPATIENT)
Age: 65
End: 2025-06-06

## 2025-06-06 VITALS — OXYGEN SATURATION: 97 % | HEART RATE: 86 BPM | DIASTOLIC BLOOD PRESSURE: 64 MMHG | SYSTOLIC BLOOD PRESSURE: 118 MMHG

## 2025-06-06 VITALS
OXYGEN SATURATION: 99 % | RESPIRATION RATE: 20 BRPM | HEART RATE: 86 BPM | DIASTOLIC BLOOD PRESSURE: 62 MMHG | SYSTOLIC BLOOD PRESSURE: 108 MMHG | TEMPERATURE: 97.2 F

## 2025-06-06 PROCEDURE — G0151 HHCP-SERV OF PT,EA 15 MIN: HCPCS

## 2025-06-06 PROCEDURE — G0299 HHS/HOSPICE OF RN EA 15 MIN: HCPCS

## 2025-06-06 NOTE — TELEPHONE ENCOUNTER
[facility], with a request for orders.    Patient has a right buttock wound.    3 cm, undermining from 1 o'clock to 6 o'clock.  (Picture uploaded in media)    She would like orders faxed:    ~Mild soap and water washes.  ~Pack dry, with calcium alginate and silver.  Silicone boarder dressing or ABD pad.    Please advise.

## 2025-06-09 ENCOUNTER — HOME CARE VISIT (OUTPATIENT)
Dept: HOME HEALTH SERVICES | Facility: HOME HEALTHCARE | Age: 65
End: 2025-06-09
Payer: COMMERCIAL

## 2025-06-09 VITALS — DIASTOLIC BLOOD PRESSURE: 78 MMHG | HEART RATE: 66 BPM | SYSTOLIC BLOOD PRESSURE: 120 MMHG | OXYGEN SATURATION: 99 %

## 2025-06-09 VITALS
DIASTOLIC BLOOD PRESSURE: 56 MMHG | RESPIRATION RATE: 18 BRPM | HEART RATE: 72 BPM | OXYGEN SATURATION: 98 % | SYSTOLIC BLOOD PRESSURE: 102 MMHG | TEMPERATURE: 98.6 F

## 2025-06-09 PROCEDURE — G0300 HHS/HOSPICE OF LPN EA 15 MIN: HCPCS

## 2025-06-09 PROCEDURE — G0151 HHCP-SERV OF PT,EA 15 MIN: HCPCS

## 2025-06-11 ENCOUNTER — HOME CARE VISIT (OUTPATIENT)
Dept: HOME HEALTH SERVICES | Facility: HOME HEALTHCARE | Age: 65
End: 2025-06-11
Payer: COMMERCIAL

## 2025-06-11 VITALS
HEART RATE: 70 BPM | OXYGEN SATURATION: 98 % | TEMPERATURE: 97.5 F | RESPIRATION RATE: 16 BRPM | SYSTOLIC BLOOD PRESSURE: 124 MMHG | DIASTOLIC BLOOD PRESSURE: 60 MMHG

## 2025-06-11 DIAGNOSIS — D62 ACUTE BLOOD LOSS ANEMIA: ICD-10-CM

## 2025-06-11 DIAGNOSIS — F41.0 PANIC ATTACK: ICD-10-CM

## 2025-06-11 DIAGNOSIS — K92.1 MELENA: ICD-10-CM

## 2025-06-11 DIAGNOSIS — I48.0 PAROXYSMAL ATRIAL FIBRILLATION (HCC): Chronic | ICD-10-CM

## 2025-06-11 PROCEDURE — G0299 HHS/HOSPICE OF RN EA 15 MIN: HCPCS

## 2025-06-11 RX ORDER — METOPROLOL TARTRATE 50 MG
50 TABLET ORAL 2 TIMES DAILY
Qty: 180 TABLET | Refills: 3 | OUTPATIENT
Start: 2025-06-11

## 2025-06-11 RX ORDER — PANTOPRAZOLE SODIUM 40 MG/1
40 TABLET, DELAYED RELEASE ORAL DAILY
Qty: 90 TABLET | Refills: 1 | Status: SHIPPED | OUTPATIENT
Start: 2025-06-11

## 2025-06-11 RX ORDER — LORAZEPAM 0.5 MG/1
0.5 TABLET ORAL 2 TIMES DAILY PRN
Qty: 60 TABLET | Refills: 0 | Status: SHIPPED | OUTPATIENT
Start: 2025-06-11

## 2025-06-11 NOTE — TELEPHONE ENCOUNTER
Please review to see if the refill is appropriate.   Metoprolol    Refill must be reviewed and completed by the office or provider. The refill is unable to be approved or denied by the medication management team.    Cannot be delegated  lorazepam

## 2025-06-12 ENCOUNTER — RESULTS FOLLOW-UP (OUTPATIENT)
Dept: FAMILY MEDICINE CLINIC | Facility: CLINIC | Age: 65
End: 2025-06-12

## 2025-06-12 NOTE — TELEPHONE ENCOUNTER
Constanza calling back with follow up after speaking with insurance. She states they will cover the   Eliquis but only the 2.5mg or 5 mg tablets.

## 2025-06-12 NOTE — TELEPHONE ENCOUNTER
Spoke with Constanza and made aware of INR result as well as PCP message. She said that she is pretty sure that insurance will not cover Eliquis from previously when someone tried to start patient on it. I advised them to reach out to insurance to verify. Please advise.

## 2025-06-12 NOTE — TELEPHONE ENCOUNTER
----- Message from TULIO Finney sent at 6/12/2025  4:00 PM EDT -----  Please check in with patient's caregiver. Is she willing to switch to Eliquis if insurance will pay. Dosing is one dose for all. Her INR is now 1.0 it does not see, she is taking enough. Her numbers   are fluctuating too much for minor dose changes. She is at risk of clot. She needs more consistent method  ----- Message -----  From: [facility] Results In  Sent: 6/12/2025   7:15 AM EDT  To: TULIO Finney

## 2025-06-13 ENCOUNTER — TELEPHONE (OUTPATIENT)
Dept: FAMILY MEDICINE CLINIC | Facility: CLINIC | Age: 65
End: 2025-06-13

## 2025-06-13 ENCOUNTER — HOME CARE VISIT (OUTPATIENT)
Dept: HOME HEALTH SERVICES | Facility: HOME HEALTHCARE | Age: 65
End: 2025-06-13
Payer: COMMERCIAL

## 2025-06-13 VITALS
DIASTOLIC BLOOD PRESSURE: 86 MMHG | SYSTOLIC BLOOD PRESSURE: 104 MMHG | TEMPERATURE: 96.4 F | OXYGEN SATURATION: 98 % | HEART RATE: 79 BPM

## 2025-06-13 DIAGNOSIS — Z79.01 CHRONIC ANTICOAGULATION: ICD-10-CM

## 2025-06-13 DIAGNOSIS — I48.11 LONGSTANDING PERSISTENT ATRIAL FIBRILLATION (HCC): ICD-10-CM

## 2025-06-13 DIAGNOSIS — D68.9 COAGULOPATHY (HCC): Primary | ICD-10-CM

## 2025-06-13 PROCEDURE — G0299 HHS/HOSPICE OF RN EA 15 MIN: HCPCS

## 2025-06-13 NOTE — TELEPHONE ENCOUNTER
It appears Coumadin usage inconsistent-this check INR 1, last check INR 4.  Patient is at great risk of thromboembolic event.  I recommend transitioning to DOAC-her partner called insurance company who asserted coverage of Eliquis 5 or 2.5 mg.    Will start Melony on 5 mg twice daily of Eliquis.  I will discontinue Coumadin once she has Eliquis in possession.

## 2025-06-13 NOTE — TELEPHONE ENCOUNTER
Please advise Constanza that I sent a prescription for Eliquis 5 mg twice daily to [facility].  Once she has the prescription in her possession, she can stop warfarin, next day start Adelina on Eliquis twice daily as directed.

## 2025-06-16 ENCOUNTER — HOME CARE VISIT (OUTPATIENT)
Dept: HOME HEALTH SERVICES | Facility: HOME HEALTHCARE | Age: 65
End: 2025-06-16
Payer: COMMERCIAL

## 2025-06-16 VITALS
SYSTOLIC BLOOD PRESSURE: 104 MMHG | TEMPERATURE: 98.3 F | RESPIRATION RATE: 18 BRPM | DIASTOLIC BLOOD PRESSURE: 54 MMHG | OXYGEN SATURATION: 100 % | HEART RATE: 85 BPM

## 2025-06-16 PROCEDURE — G0299 HHS/HOSPICE OF RN EA 15 MIN: HCPCS

## 2025-06-18 ENCOUNTER — HOME CARE VISIT (OUTPATIENT)
Dept: HOME HEALTH SERVICES | Facility: HOME HEALTHCARE | Age: 65
End: 2025-06-18
Payer: COMMERCIAL

## 2025-06-18 VITALS
TEMPERATURE: 97.6 F | HEART RATE: 76 BPM | OXYGEN SATURATION: 100 % | RESPIRATION RATE: 20 BRPM | DIASTOLIC BLOOD PRESSURE: 80 MMHG | SYSTOLIC BLOOD PRESSURE: 140 MMHG

## 2025-06-18 PROCEDURE — G0299 HHS/HOSPICE OF RN EA 15 MIN: HCPCS

## 2025-06-19 ENCOUNTER — HOME CARE VISIT (OUTPATIENT)
Dept: HOME HEALTH SERVICES | Facility: HOME HEALTHCARE | Age: 65
End: 2025-06-19
Payer: COMMERCIAL

## 2025-06-19 VITALS — OXYGEN SATURATION: 100 % | DIASTOLIC BLOOD PRESSURE: 72 MMHG | SYSTOLIC BLOOD PRESSURE: 110 MMHG | HEART RATE: 77 BPM

## 2025-06-19 PROCEDURE — G0151 HHCP-SERV OF PT,EA 15 MIN: HCPCS

## 2025-06-20 ENCOUNTER — HOME CARE VISIT (OUTPATIENT)
Dept: HOME HEALTH SERVICES | Facility: HOME HEALTHCARE | Age: 65
End: 2025-06-20
Payer: COMMERCIAL

## 2025-06-20 ENCOUNTER — TELEPHONE (OUTPATIENT)
Dept: FAMILY MEDICINE CLINIC | Facility: CLINIC | Age: 65
End: 2025-06-20

## 2025-06-20 VITALS
SYSTOLIC BLOOD PRESSURE: 124 MMHG | DIASTOLIC BLOOD PRESSURE: 62 MMHG | HEART RATE: 78 BPM | RESPIRATION RATE: 16 BRPM | OXYGEN SATURATION: 99 % | TEMPERATURE: 97.4 F

## 2025-06-20 DIAGNOSIS — E03.4 HYPOTHYROIDISM DUE TO ACQUIRED ATROPHY OF THYROID: Chronic | ICD-10-CM

## 2025-06-20 PROCEDURE — G0299 HHS/HOSPICE OF RN EA 15 MIN: HCPCS

## 2025-06-20 RX ORDER — LEVOTHYROXINE SODIUM 88 UG/1
88 TABLET ORAL
Qty: 90 TABLET | Refills: 2 | Status: SHIPPED | OUTPATIENT
Start: 2025-06-20

## 2025-06-20 NOTE — CASE COMMUNICATION
Primary PT unavailable to make visit as scheduled today. Phoned patient and spoke with caregiver. PT will resume again next week with another therapist.

## 2025-06-20 NOTE — TELEPHONE ENCOUNTER
She should be taking the 88 mcg. It was lowered in the hospital. I ordered lab testing -faxed to [facility]. Not sure why never drawn

## 2025-06-20 NOTE — TELEPHONE ENCOUNTER
Spoke with Constanza and informed that dose should be 88 mcg. She said that she is waiting for someone from [facility] to call and let them know when they are coming. No further questions at this time.

## 2025-06-23 ENCOUNTER — NURSE TRIAGE (OUTPATIENT)
Age: 65
End: 2025-06-23

## 2025-06-23 ENCOUNTER — HOME CARE VISIT (OUTPATIENT)
Dept: HOME HEALTH SERVICES | Facility: HOME HEALTHCARE | Age: 65
End: 2025-06-23
Payer: COMMERCIAL

## 2025-06-23 ENCOUNTER — TELEPHONE (OUTPATIENT)
Age: 65
End: 2025-06-23

## 2025-06-23 VITALS
RESPIRATION RATE: 18 BRPM | TEMPERATURE: 97.6 F | DIASTOLIC BLOOD PRESSURE: 59 MMHG | SYSTOLIC BLOOD PRESSURE: 101 MMHG | HEART RATE: 89 BPM | OXYGEN SATURATION: 100 %

## 2025-06-23 DIAGNOSIS — F41.9 ANXIETY: ICD-10-CM

## 2025-06-23 PROCEDURE — G0299 HHS/HOSPICE OF RN EA 15 MIN: HCPCS

## 2025-06-23 NOTE — TELEPHONE ENCOUNTER
"REASON FOR CONVERSATION: Dizziness    SYMPTOMS: Pt is having what she describes as increased panic attacks.  Her symptoms are tingling from her head to her abdomen and feeling lightheaded when they occur. Her symptoms are relived by using ativan 0.5mg twice a day as needed.  She also takes Zoloft daily.  Pt's visiting nurse is calling her her behalf.     OTHER HEALTH INFORMATION: She states that this is how her normal panic attacks present.  She is also having decreased appetite.   VS as follows:  101/59 supine   Spo2 100% 3LNC   Heart rate 89  Temp 97.6  RR 18      PROTOCOL DISPOSITION: See Within 3 Days in Office    CARE ADVICE PROVIDED: Continue using ativan as prescribed.  Call back if symptoms change.     PRACTICE FOLLOW-UP: Pt is asking for an increase in medication for anxiety control.     Reason for Disposition   Panic attacks are increasing in frequency    Answer Assessment - Initial Assessment Questions  1. CONCERN: \"Did anything happen that prompted you to call today?\"       Having increased anxiety   2. ANXIETY SYMPTOMS: \"Can you describe how you (your loved one; patient) have been feeling?\" (e.g., tense, restless, panicky, anxious, keyed up, overwhelmed, sense of impending doom).       Tingling sensation from head to torso, feeling of dizziness, symptoms alleviated by ativan   3. ONSET: \"How long have you been feeling this way?\" (e.g., hours, days, weeks)      Over the last few weeks   4. SEVERITY: \"How would you rate the level of anxiety?\" (e.g., 0 - 10; or mild, moderate, severe).      Moderate   5. FUNCTIONAL IMPAIRMENT: \"How have these feelings affected your ability to do daily activities?\" \"Have you had more difficulty than usual doing your normal daily activities?\" (e.g., getting better, same, worse; self-care, school, work, interactions)      no  6. HISTORY: \"Have you felt this way before?\" \"Have you ever been diagnosed with an anxiety problem in the past?\" (e.g., generalized anxiety disorder, " "panic attacks, PTSD). If Yes, ask: \"How was this problem treated?\" (e.g., medicines, counseling, etc.)      Yes, history of anxiety in the past   7. RISK OF HARM - SUICIDAL IDEATION: \"Do you ever have thoughts of hurting or killing yourself?\" If Yes, ask:  \"Do you have these feelings now?\" \"Do you have a plan on how you would do this?\"      No SI, HI  8. TREATMENT:  \"What has been done so far to treat this anxiety?\" (e.g., medicines, relaxation strategies). \"What has helped?\"      Ativan and zoloft   9. THERAPIST: \"Do you have a counselor or therapist?\" If Yes, ask: \"What is their name?\"      Unknown   10. POTENTIAL TRIGGERS: \"Do you drink caffeinated beverages (e.g., coffee, jakob, teas), and how much daily?\" \"Do you drink alcohol or use any drugs?\" \"Have you started any new medicines recently?\"        no  11. PATIENT SUPPORT: \"Who is with you now?\" \"Who do you live with?\" \"Do you have family or friends who you can talk to?\"         JONNA and Constanza (friend)  12. OTHER SYMPTOMS: \"Do you have any other symptoms?\" (e.g., feeling depressed, trouble concentrating, trouble sleeping, trouble breathing, palpitations or fast heartbeat, chest pain, sweating, nausea, or diarrhea)        Decreased appetite.   13. PREGNANCY: \"Is there any chance you are pregnant?\" \"When was your last menstrual period?\"        no    Protocols used: Anxiety and Panic Attack-Adult-[place]    "

## 2025-06-23 NOTE — TELEPHONE ENCOUNTER
I can offer an increase of sertraline but she is on high dose of ativan already. I will not increase that medication. Let me know if interested

## 2025-06-24 ENCOUNTER — HOME CARE VISIT (OUTPATIENT)
Dept: HOME HEALTH SERVICES | Facility: HOME HEALTHCARE | Age: 65
End: 2025-06-24
Payer: COMMERCIAL

## 2025-06-24 NOTE — TELEPHONE ENCOUNTER
Called and spoke marlee Apodaca -- they were agreeable to dose increase of Zoloft, they feel this would be beneficial change.

## 2025-06-25 ENCOUNTER — HOME CARE VISIT (OUTPATIENT)
Dept: HOME HEALTH SERVICES | Facility: HOME HEALTHCARE | Age: 65
End: 2025-06-25
Payer: COMMERCIAL

## 2025-06-25 VITALS
SYSTOLIC BLOOD PRESSURE: 110 MMHG | OXYGEN SATURATION: 99 % | DIASTOLIC BLOOD PRESSURE: 60 MMHG | HEART RATE: 72 BPM | RESPIRATION RATE: 18 BRPM | TEMPERATURE: 98.5 F

## 2025-06-25 PROCEDURE — G0300 HHS/HOSPICE OF LPN EA 15 MIN: HCPCS

## 2025-06-25 RX ORDER — SERTRALINE HYDROCHLORIDE 100 MG/1
100 TABLET, FILM COATED ORAL DAILY
Qty: 90 TABLET | Refills: 0 | Status: SHIPPED | OUTPATIENT
Start: 2025-06-25

## 2025-06-26 ENCOUNTER — HOME CARE VISIT (OUTPATIENT)
Dept: HOME HEALTH SERVICES | Facility: HOME HEALTHCARE | Age: 65
End: 2025-06-26
Payer: COMMERCIAL

## 2025-06-26 VITALS — HEART RATE: 80 BPM | DIASTOLIC BLOOD PRESSURE: 72 MMHG | OXYGEN SATURATION: 98 % | SYSTOLIC BLOOD PRESSURE: 110 MMHG

## 2025-06-26 PROCEDURE — G0151 HHCP-SERV OF PT,EA 15 MIN: HCPCS

## 2025-06-27 ENCOUNTER — TELEPHONE (OUTPATIENT)
Age: 65
End: 2025-06-27

## 2025-06-27 ENCOUNTER — HOME CARE VISIT (OUTPATIENT)
Dept: HOME HEALTH SERVICES | Facility: HOME HEALTHCARE | Age: 65
End: 2025-06-27
Payer: COMMERCIAL

## 2025-06-27 VITALS
TEMPERATURE: 98.6 F | OXYGEN SATURATION: 100 % | DIASTOLIC BLOOD PRESSURE: 64 MMHG | RESPIRATION RATE: 18 BRPM | HEART RATE: 88 BPM | SYSTOLIC BLOOD PRESSURE: 104 MMHG

## 2025-06-27 PROCEDURE — G0299 HHS/HOSPICE OF RN EA 15 MIN: HCPCS

## 2025-06-27 NOTE — TELEPHONE ENCOUNTER
She should not double up her lorazepam in the morning-she should continue the lorazepam twice daily.  This will stop working and she will need a second dose and I will not be able to increase that dose.   She can give her Zoloft at the time a day that best works for Adelina

## 2025-06-27 NOTE — TELEPHONE ENCOUNTER
Spoke with Constanza and she said that the message was relayed wrong. She said that she is only giving patient morning dose of lorazepam. She omitted the night time dose of lorazepam but in place gave patients zoloft at bedtime. She said it seemed to have worked better for patient. She said that she didn't double dose of lorazepam.

## 2025-06-27 NOTE — TELEPHONE ENCOUNTER
April 5, 2024    Hello, may I speak with [employer]qu[employer] Madden?    My name is Sowmya LEÓN      I am  with [facility]  [address]  [place] [address] [phone].    Before we get started may I verify your date of birth? 1961 Yes.     I am calling to officially welcome you to our practice and ask about your recent visit. Is this a good time to talk? yes    Tell me about your visit with us. What things went well?  Everything. The office seems happy. Everyone seems like they like each other.        We're always looking for ways to make our patients' experiences even better. Do you have recommendations on ways we may improve?  No, nothing.     Overall were you satisfied with your first visit to our practice? Yes it was excellent.        I appreciate you taking the time to speak with me today. Is there anything else I can do for you? no      Thank you, and have a great day.       Patient's caretaker called to say that she switched the patient's time of medication. She is now getting her Lorazepam in the morning and the Zoloft at night. Patient did great like that. She slept really well and no twitching. She wants to make sure switching is okay. Please, advise.

## 2025-06-30 ENCOUNTER — HOME CARE VISIT (OUTPATIENT)
Dept: HOME HEALTH SERVICES | Facility: HOME HEALTHCARE | Age: 65
End: 2025-06-30
Payer: COMMERCIAL

## 2025-06-30 VITALS
DIASTOLIC BLOOD PRESSURE: 80 MMHG | OXYGEN SATURATION: 98 % | HEART RATE: 68 BPM | SYSTOLIC BLOOD PRESSURE: 132 MMHG | TEMPERATURE: 98.4 F | RESPIRATION RATE: 18 BRPM

## 2025-06-30 PROCEDURE — G0300 HHS/HOSPICE OF LPN EA 15 MIN: HCPCS

## 2025-06-30 NOTE — CASE COMMUNICATION
Ship to  Pt. Home     Ordering MD LALY Sam    Wound 1      Full    Frequency    2xw        Sub for Mepilex:  Silicone adhesive foam 3x3 border NOT STOCKED [id number]    10

## 2025-06-30 NOTE — Clinical Note
"Naz BENJAMIN  Bonnie  ----- Message -----  From: Tia Singh RN  Sent: 2025  11:23 AM EDT  To: Miriam Wilson LPN      An order was received for supplies for patient DHAVAL SANDERS, : 1960. It is currently too soon to place the order for item [id number] FOAM, ADH DAYTON W/BORDER 3\"X3\" (10/BX 20BX/CS). The reorder date will be 2025. This order will be cancelled, but a new order can be placed closer to the reorder date.  Thank you,  ----- Message -----  From: Miriam Wilson LPN  Sent: 2025   3:37 PM EDT  To: /\[facility]\"" Supplies    Ship to  Pt. Home     Ordering MD LALY Davalosigue    Wound 1      Full    Frequency    2xw        Sub for Mepilex:  Silicone adhesive foam 3x3 border NOT STOCKED [id number]    10    "

## 2025-07-02 ENCOUNTER — HOME CARE VISIT (OUTPATIENT)
Dept: HOME HEALTH SERVICES | Facility: HOME HEALTHCARE | Age: 65
End: 2025-07-02
Payer: COMMERCIAL

## 2025-07-02 VITALS
RESPIRATION RATE: 18 BRPM | SYSTOLIC BLOOD PRESSURE: 106 MMHG | DIASTOLIC BLOOD PRESSURE: 69 MMHG | TEMPERATURE: 97.4 F | HEART RATE: 74 BPM | OXYGEN SATURATION: 100 %

## 2025-07-02 VITALS — OXYGEN SATURATION: 99 % | DIASTOLIC BLOOD PRESSURE: 80 MMHG | HEART RATE: 88 BPM | SYSTOLIC BLOOD PRESSURE: 138 MMHG

## 2025-07-02 PROCEDURE — G0151 HHCP-SERV OF PT,EA 15 MIN: HCPCS

## 2025-07-02 PROCEDURE — G0299 HHS/HOSPICE OF RN EA 15 MIN: HCPCS

## 2025-07-04 ENCOUNTER — HOME CARE VISIT (OUTPATIENT)
Dept: HOME HEALTH SERVICES | Facility: HOME HEALTHCARE | Age: 65
End: 2025-07-04
Payer: COMMERCIAL

## 2025-07-07 ENCOUNTER — HOME CARE VISIT (OUTPATIENT)
Dept: HOME HEALTH SERVICES | Facility: HOME HEALTHCARE | Age: 65
End: 2025-07-07
Payer: COMMERCIAL

## 2025-07-07 VITALS
SYSTOLIC BLOOD PRESSURE: 110 MMHG | RESPIRATION RATE: 18 BRPM | DIASTOLIC BLOOD PRESSURE: 60 MMHG | TEMPERATURE: 97.2 F | HEART RATE: 82 BPM | OXYGEN SATURATION: 100 %

## 2025-07-07 VITALS — HEART RATE: 89 BPM | SYSTOLIC BLOOD PRESSURE: 110 MMHG | DIASTOLIC BLOOD PRESSURE: 72 MMHG | OXYGEN SATURATION: 99 %

## 2025-07-07 PROCEDURE — G0299 HHS/HOSPICE OF RN EA 15 MIN: HCPCS

## 2025-07-07 PROCEDURE — G0151 HHCP-SERV OF PT,EA 15 MIN: HCPCS

## 2025-07-09 ENCOUNTER — HOME CARE VISIT (OUTPATIENT)
Dept: HOME HEALTH SERVICES | Facility: HOME HEALTHCARE | Age: 65
End: 2025-07-09
Payer: COMMERCIAL

## 2025-07-09 VITALS
TEMPERATURE: 97.5 F | HEART RATE: 84 BPM | OXYGEN SATURATION: 100 % | RESPIRATION RATE: 18 BRPM | SYSTOLIC BLOOD PRESSURE: 112 MMHG | DIASTOLIC BLOOD PRESSURE: 72 MMHG

## 2025-07-09 VITALS — HEART RATE: 79 BPM | OXYGEN SATURATION: 98 % | SYSTOLIC BLOOD PRESSURE: 121 MMHG | DIASTOLIC BLOOD PRESSURE: 72 MMHG

## 2025-07-09 PROCEDURE — G0299 HHS/HOSPICE OF RN EA 15 MIN: HCPCS

## 2025-07-09 PROCEDURE — G0151 HHCP-SERV OF PT,EA 15 MIN: HCPCS

## 2025-07-11 ENCOUNTER — HOME CARE VISIT (OUTPATIENT)
Dept: HOME HEALTH SERVICES | Facility: HOME HEALTHCARE | Age: 65
End: 2025-07-11
Payer: COMMERCIAL

## 2025-07-11 VITALS
SYSTOLIC BLOOD PRESSURE: 112 MMHG | DIASTOLIC BLOOD PRESSURE: 68 MMHG | RESPIRATION RATE: 18 BRPM | HEART RATE: 72 BPM | OXYGEN SATURATION: 98 % | TEMPERATURE: 98.3 F

## 2025-07-11 PROCEDURE — G0300 HHS/HOSPICE OF LPN EA 15 MIN: HCPCS

## 2025-07-14 ENCOUNTER — HOME CARE VISIT (OUTPATIENT)
Dept: HOME HEALTH SERVICES | Facility: HOME HEALTHCARE | Age: 65
End: 2025-07-14
Payer: COMMERCIAL

## 2025-07-14 VITALS — TEMPERATURE: 96.5 F | OXYGEN SATURATION: 99 % | HEART RATE: 72 BPM

## 2025-07-14 PROCEDURE — G0299 HHS/HOSPICE OF RN EA 15 MIN: HCPCS

## 2025-07-15 ENCOUNTER — HOME CARE VISIT (OUTPATIENT)
Dept: HOME HEALTH SERVICES | Facility: HOME HEALTHCARE | Age: 65
End: 2025-07-15
Payer: COMMERCIAL

## 2025-07-16 ENCOUNTER — HOME CARE VISIT (OUTPATIENT)
Dept: HOME HEALTH SERVICES | Facility: HOME HEALTHCARE | Age: 65
End: 2025-07-16
Payer: COMMERCIAL

## 2025-07-16 VITALS
DIASTOLIC BLOOD PRESSURE: 78 MMHG | RESPIRATION RATE: 18 BRPM | TEMPERATURE: 96.6 F | SYSTOLIC BLOOD PRESSURE: 138 MMHG | OXYGEN SATURATION: 99 % | HEART RATE: 88 BPM

## 2025-07-16 PROCEDURE — G0300 HHS/HOSPICE OF LPN EA 15 MIN: HCPCS

## 2025-07-18 ENCOUNTER — HOME CARE VISIT (OUTPATIENT)
Dept: HOME HEALTH SERVICES | Facility: HOME HEALTHCARE | Age: 65
End: 2025-07-18
Payer: COMMERCIAL

## 2025-07-18 VITALS
HEART RATE: 90 BPM | OXYGEN SATURATION: 99 % | SYSTOLIC BLOOD PRESSURE: 108 MMHG | RESPIRATION RATE: 18 BRPM | DIASTOLIC BLOOD PRESSURE: 64 MMHG | TEMPERATURE: 98.2 F

## 2025-07-21 ENCOUNTER — HOME CARE VISIT (OUTPATIENT)
Dept: HOME HEALTH SERVICES | Facility: HOME HEALTHCARE | Age: 65
End: 2025-07-21
Payer: COMMERCIAL

## 2025-07-21 VITALS
RESPIRATION RATE: 18 BRPM | OXYGEN SATURATION: 98 % | TEMPERATURE: 97.1 F | SYSTOLIC BLOOD PRESSURE: 122 MMHG | DIASTOLIC BLOOD PRESSURE: 72 MMHG | HEART RATE: 67 BPM

## 2025-07-21 PROCEDURE — 400014 VN F/U

## 2025-07-21 PROCEDURE — G0299 HHS/HOSPICE OF RN EA 15 MIN: HCPCS

## 2025-07-22 DIAGNOSIS — E03.8 OTHER SPECIFIED HYPOTHYROIDISM: ICD-10-CM

## 2025-07-22 DIAGNOSIS — I48.0 PAROXYSMAL ATRIAL FIBRILLATION (HCC): Chronic | ICD-10-CM

## 2025-07-22 DIAGNOSIS — E78.5 DYSLIPIDEMIA: ICD-10-CM

## 2025-07-22 DIAGNOSIS — R56.9 SEIZURE (HCC): ICD-10-CM

## 2025-07-23 ENCOUNTER — HOME CARE VISIT (OUTPATIENT)
Dept: HOME HEALTH SERVICES | Facility: HOME HEALTHCARE | Age: 65
End: 2025-07-23
Payer: COMMERCIAL

## 2025-07-23 VITALS
HEART RATE: 76 BPM | RESPIRATION RATE: 16 BRPM | SYSTOLIC BLOOD PRESSURE: 128 MMHG | OXYGEN SATURATION: 98 % | DIASTOLIC BLOOD PRESSURE: 70 MMHG | TEMPERATURE: 97.9 F

## 2025-07-23 PROCEDURE — G0299 HHS/HOSPICE OF RN EA 15 MIN: HCPCS

## 2025-07-23 RX ORDER — VALPROIC ACID 250 MG/5ML
500 SOLUTION ORAL EVERY 6 HOURS SCHEDULED
Qty: 946 ML | Refills: 1 | Status: SHIPPED | OUTPATIENT
Start: 2025-07-23

## 2025-07-24 RX ORDER — LEVOTHYROXINE SODIUM 100 UG/1
100 TABLET ORAL DAILY
Qty: 90 TABLET | Refills: 3 | OUTPATIENT
Start: 2025-07-24

## 2025-07-24 RX ORDER — METOPROLOL TARTRATE 50 MG
50 TABLET ORAL 2 TIMES DAILY
Qty: 180 TABLET | Refills: 3 | OUTPATIENT
Start: 2025-07-24

## 2025-07-24 RX ORDER — ATORVASTATIN CALCIUM 10 MG/1
10 TABLET, FILM COATED ORAL DAILY
Qty: 90 TABLET | Refills: 1 | Status: SHIPPED | OUTPATIENT
Start: 2025-07-24

## 2025-07-25 ENCOUNTER — HOME CARE VISIT (OUTPATIENT)
Dept: HOME HEALTH SERVICES | Facility: HOME HEALTHCARE | Age: 65
End: 2025-07-25
Payer: COMMERCIAL

## 2025-07-25 VITALS
DIASTOLIC BLOOD PRESSURE: 66 MMHG | HEART RATE: 80 BPM | TEMPERATURE: 98 F | RESPIRATION RATE: 18 BRPM | SYSTOLIC BLOOD PRESSURE: 116 MMHG | OXYGEN SATURATION: 98 %

## 2025-07-25 PROCEDURE — G0300 HHS/HOSPICE OF LPN EA 15 MIN: HCPCS

## 2025-07-28 ENCOUNTER — TELEPHONE (OUTPATIENT)
Age: 65
End: 2025-07-28

## 2025-07-28 ENCOUNTER — HOME CARE VISIT (OUTPATIENT)
Dept: HOME HEALTH SERVICES | Facility: HOME HEALTHCARE | Age: 65
End: 2025-07-28
Payer: COMMERCIAL

## 2025-07-28 VITALS
HEART RATE: 71 BPM | OXYGEN SATURATION: 99 % | TEMPERATURE: 97.5 F | SYSTOLIC BLOOD PRESSURE: 112 MMHG | DIASTOLIC BLOOD PRESSURE: 68 MMHG | RESPIRATION RATE: 18 BRPM

## 2025-07-28 PROCEDURE — G0299 HHS/HOSPICE OF RN EA 15 MIN: HCPCS

## 2025-07-30 ENCOUNTER — HOME CARE VISIT (OUTPATIENT)
Dept: HOME HEALTH SERVICES | Facility: HOME HEALTHCARE | Age: 65
End: 2025-07-30
Payer: COMMERCIAL

## 2025-07-30 VITALS
OXYGEN SATURATION: 97 % | DIASTOLIC BLOOD PRESSURE: 60 MMHG | TEMPERATURE: 97.8 F | RESPIRATION RATE: 18 BRPM | SYSTOLIC BLOOD PRESSURE: 118 MMHG | HEART RATE: 68 BPM

## 2025-07-30 PROCEDURE — G0300 HHS/HOSPICE OF LPN EA 15 MIN: HCPCS

## 2025-08-01 ENCOUNTER — HOME CARE VISIT (OUTPATIENT)
Dept: HOME HEALTH SERVICES | Facility: HOME HEALTHCARE | Age: 65
End: 2025-08-01
Payer: COMMERCIAL

## 2025-08-01 DIAGNOSIS — D62 ACUTE BLOOD LOSS ANEMIA: ICD-10-CM

## 2025-08-01 DIAGNOSIS — K92.1 MELENA: ICD-10-CM

## 2025-08-01 DIAGNOSIS — F41.0 PANIC ATTACK: ICD-10-CM

## 2025-08-01 PROCEDURE — G0299 HHS/HOSPICE OF RN EA 15 MIN: HCPCS

## 2025-08-01 RX ORDER — LORAZEPAM 0.5 MG/1
0.5 TABLET ORAL 2 TIMES DAILY PRN
Qty: 60 TABLET | Refills: 0 | Status: SHIPPED | OUTPATIENT
Start: 2025-08-01

## 2025-08-01 RX ORDER — PANTOPRAZOLE SODIUM 40 MG/1
40 TABLET, DELAYED RELEASE ORAL DAILY
Qty: 90 TABLET | Refills: 1 | Status: SHIPPED | OUTPATIENT
Start: 2025-08-01

## 2025-08-02 VITALS
OXYGEN SATURATION: 99 % | SYSTOLIC BLOOD PRESSURE: 136 MMHG | DIASTOLIC BLOOD PRESSURE: 68 MMHG | HEART RATE: 78 BPM | RESPIRATION RATE: 18 BRPM | TEMPERATURE: 97.9 F

## 2025-08-04 ENCOUNTER — HOME CARE VISIT (OUTPATIENT)
Dept: HOME HEALTH SERVICES | Facility: HOME HEALTHCARE | Age: 65
End: 2025-08-04
Payer: COMMERCIAL

## 2025-08-04 VITALS
RESPIRATION RATE: 18 BRPM | OXYGEN SATURATION: 99 % | TEMPERATURE: 98.1 F | SYSTOLIC BLOOD PRESSURE: 118 MMHG | DIASTOLIC BLOOD PRESSURE: 62 MMHG | HEART RATE: 78 BPM

## 2025-08-04 DIAGNOSIS — J96.12 CHRONIC RESPIRATORY FAILURE WITH HYPERCAPNIA (HCC): ICD-10-CM

## 2025-08-04 DIAGNOSIS — R56.9 SEIZURE (HCC): ICD-10-CM

## 2025-08-04 PROCEDURE — G0299 HHS/HOSPICE OF RN EA 15 MIN: HCPCS

## 2025-08-04 RX ORDER — LEVETIRACETAM 750 MG/1
750 TABLET ORAL EVERY 12 HOURS SCHEDULED
Qty: 90 TABLET | Refills: 3 | Status: SHIPPED | OUTPATIENT
Start: 2025-08-04

## 2025-08-04 RX ORDER — ALBUTEROL SULFATE 90 UG/1
2 INHALANT RESPIRATORY (INHALATION) EVERY 6 HOURS PRN
Qty: 6.7 G | Refills: 5 | Status: SHIPPED | OUTPATIENT
Start: 2025-08-04

## 2025-08-06 ENCOUNTER — HOME CARE VISIT (OUTPATIENT)
Dept: HOME HEALTH SERVICES | Facility: HOME HEALTHCARE | Age: 65
End: 2025-08-06
Payer: COMMERCIAL

## 2025-08-06 VITALS
HEART RATE: 68 BPM | DIASTOLIC BLOOD PRESSURE: 82 MMHG | SYSTOLIC BLOOD PRESSURE: 124 MMHG | RESPIRATION RATE: 18 BRPM | OXYGEN SATURATION: 99 % | TEMPERATURE: 95.6 F

## 2025-08-06 PROCEDURE — G0299 HHS/HOSPICE OF RN EA 15 MIN: HCPCS

## 2025-08-08 ENCOUNTER — HOME CARE VISIT (OUTPATIENT)
Dept: HOME HEALTH SERVICES | Facility: HOME HEALTHCARE | Age: 65
End: 2025-08-08
Payer: COMMERCIAL

## 2025-08-08 VITALS
OXYGEN SATURATION: 100 % | DIASTOLIC BLOOD PRESSURE: 62 MMHG | SYSTOLIC BLOOD PRESSURE: 112 MMHG | HEART RATE: 64 BPM | TEMPERATURE: 97.5 F | RESPIRATION RATE: 18 BRPM

## 2025-08-08 PROCEDURE — G0299 HHS/HOSPICE OF RN EA 15 MIN: HCPCS

## 2025-08-11 ENCOUNTER — HOME CARE VISIT (OUTPATIENT)
Dept: HOME HEALTH SERVICES | Facility: HOME HEALTHCARE | Age: 65
End: 2025-08-11
Payer: COMMERCIAL

## 2025-08-13 ENCOUNTER — HOME CARE VISIT (OUTPATIENT)
Dept: HOME HEALTH SERVICES | Facility: HOME HEALTHCARE | Age: 65
End: 2025-08-13
Payer: COMMERCIAL

## 2025-08-15 ENCOUNTER — HOME CARE VISIT (OUTPATIENT)
Dept: HOME HEALTH SERVICES | Facility: HOME HEALTHCARE | Age: 65
End: 2025-08-15
Payer: COMMERCIAL

## 2025-08-18 ENCOUNTER — HOME CARE VISIT (OUTPATIENT)
Dept: HOME HEALTH SERVICES | Facility: HOME HEALTHCARE | Age: 65
End: 2025-08-18
Payer: COMMERCIAL

## 2025-08-18 VITALS
HEART RATE: 76 BPM | OXYGEN SATURATION: 99 % | SYSTOLIC BLOOD PRESSURE: 126 MMHG | DIASTOLIC BLOOD PRESSURE: 64 MMHG | TEMPERATURE: 97.8 F | RESPIRATION RATE: 18 BRPM

## 2025-08-18 PROCEDURE — G0300 HHS/HOSPICE OF LPN EA 15 MIN: HCPCS

## 2025-08-20 ENCOUNTER — HOME CARE VISIT (OUTPATIENT)
Dept: HOME HEALTH SERVICES | Facility: HOME HEALTHCARE | Age: 65
End: 2025-08-20
Payer: COMMERCIAL

## 2025-08-20 VITALS
DIASTOLIC BLOOD PRESSURE: 66 MMHG | OXYGEN SATURATION: 99 % | TEMPERATURE: 97.6 F | HEART RATE: 68 BPM | SYSTOLIC BLOOD PRESSURE: 110 MMHG | RESPIRATION RATE: 18 BRPM

## 2025-08-20 PROCEDURE — G0300 HHS/HOSPICE OF LPN EA 15 MIN: HCPCS

## 2025-08-22 ENCOUNTER — HOME CARE VISIT (OUTPATIENT)
Dept: HOME HEALTH SERVICES | Facility: HOME HEALTHCARE | Age: 65
End: 2025-08-22
Payer: COMMERCIAL

## 2025-08-22 VITALS — OXYGEN SATURATION: 100 % | HEART RATE: 75 BPM | TEMPERATURE: 98.7 F

## 2025-08-22 PROCEDURE — G0299 HHS/HOSPICE OF RN EA 15 MIN: HCPCS
